# Patient Record
Sex: FEMALE | Race: WHITE | Employment: OTHER | URBAN - METROPOLITAN AREA
[De-identification: names, ages, dates, MRNs, and addresses within clinical notes are randomized per-mention and may not be internally consistent; named-entity substitution may affect disease eponyms.]

---

## 2017-01-17 DIAGNOSIS — E78.5 DYSLIPIDEMIA: ICD-10-CM

## 2017-01-17 RX ORDER — ATORVASTATIN CALCIUM 20 MG/1
20 TABLET, FILM COATED ORAL DAILY
Qty: 30 TABLET | Refills: 3
Start: 2017-01-17 | End: 2017-05-30 | Stop reason: SDUPTHER

## 2017-02-02 ENCOUNTER — OFFICE VISIT (OUTPATIENT)
Dept: INTERNAL MEDICINE | Age: 52
End: 2017-02-02
Attending: INTERNAL MEDICINE

## 2017-02-02 ENCOUNTER — HOSPITAL ENCOUNTER (OUTPATIENT)
Dept: GENERAL RADIOLOGY | Age: 52
Discharge: HOME | End: 2017-02-02

## 2017-02-02 VITALS
OXYGEN SATURATION: 94 % | RESPIRATION RATE: 20 BRPM | SYSTOLIC BLOOD PRESSURE: 151 MMHG | HEART RATE: 106 BPM | TEMPERATURE: 98.8 F | BODY MASS INDEX: 34.75 KG/M2 | DIASTOLIC BLOOD PRESSURE: 85 MMHG | WEIGHT: 190 LBS

## 2017-02-02 DIAGNOSIS — L98.9 SKIN LESION OF SCALP: ICD-10-CM

## 2017-02-02 DIAGNOSIS — R52 PAIN: ICD-10-CM

## 2017-02-02 DIAGNOSIS — E11.42 TYPE 2 DIABETES MELLITUS WITH DIABETIC POLYNEUROPATHY, WITH LONG-TERM CURRENT USE OF INSULIN (HCC): ICD-10-CM

## 2017-02-02 DIAGNOSIS — F43.29 STRESS AND ADJUSTMENT REACTION: ICD-10-CM

## 2017-02-02 DIAGNOSIS — Z79.4 TYPE 2 DIABETES MELLITUS WITH DIABETIC POLYNEUROPATHY, WITH LONG-TERM CURRENT USE OF INSULIN (HCC): ICD-10-CM

## 2017-02-02 DIAGNOSIS — L97.529 DIABETIC ULCER OF LEFT FOOT ASSOCIATED WITH TYPE 2 DIABETES MELLITUS (HCC): ICD-10-CM

## 2017-02-02 DIAGNOSIS — Z79.4 TYPE 2 DIABETES MELLITUS WITH DIABETIC POLYNEUROPATHY, WITH LONG-TERM CURRENT USE OF INSULIN (HCC): Primary | ICD-10-CM

## 2017-02-02 DIAGNOSIS — E11.42 TYPE 2 DIABETES MELLITUS WITH DIABETIC POLYNEUROPATHY, WITH LONG-TERM CURRENT USE OF INSULIN (HCC): Primary | ICD-10-CM

## 2017-02-02 DIAGNOSIS — E11.621 DIABETIC ULCER OF LEFT FOOT ASSOCIATED WITH TYPE 2 DIABETES MELLITUS (HCC): ICD-10-CM

## 2017-02-02 DIAGNOSIS — Z72.0 TOBACCO ABUSE: ICD-10-CM

## 2017-02-02 DIAGNOSIS — E11.42 DIABETIC POLYNEUROPATHY ASSOCIATED WITH TYPE 2 DIABETES MELLITUS (HCC): Chronic | ICD-10-CM

## 2017-02-02 DIAGNOSIS — M51.36 DEGENERATIVE DISC DISEASE, LUMBAR: ICD-10-CM

## 2017-02-02 LAB
ALBUMIN SERPL-MCNC: 3.9 G/DL (ref 3.4–5)
ANION GAP SERPL CALCULATED.3IONS-SCNC: 11 MMOL/L (ref 3–16)
BUN BLDV-MCNC: 15 MG/DL (ref 7–20)
CALCIUM SERPL-MCNC: 9.2 MG/DL (ref 8.3–10.6)
CHLORIDE BLD-SCNC: 97 MMOL/L (ref 99–110)
CO2: 26 MMOL/L (ref 21–32)
CREAT SERPL-MCNC: 1 MG/DL (ref 0.6–1.1)
GFR AFRICAN AMERICAN: >60
GFR NON-AFRICAN AMERICAN: 58
GLUCOSE BLD-MCNC: 354 MG/DL (ref 70–99)
PHOSPHORUS: 2.7 MG/DL (ref 2.5–4.9)
POTASSIUM SERPL-SCNC: 5 MMOL/L (ref 3.5–5.1)
SODIUM BLD-SCNC: 134 MMOL/L (ref 136–145)

## 2017-02-02 RX ORDER — OMEPRAZOLE 20 MG/1
20 CAPSULE, DELAYED RELEASE ORAL DAILY
Qty: 30 CAPSULE | Refills: 4 | Status: SHIPPED | OUTPATIENT
Start: 2017-02-02 | End: 2017-07-14 | Stop reason: SDUPTHER

## 2017-02-02 RX ORDER — DOXYCYCLINE 100 MG/1
100 TABLET ORAL 2 TIMES DAILY
Qty: 28 TABLET | Refills: 0 | Status: SHIPPED | OUTPATIENT
Start: 2017-02-02 | End: 2017-02-16

## 2017-02-02 RX ORDER — AMITRIPTYLINE HYDROCHLORIDE 100 MG/1
100 TABLET, FILM COATED ORAL NIGHTLY
Qty: 30 TABLET | Refills: 3 | Status: SHIPPED | OUTPATIENT
Start: 2017-02-02 | End: 2017-03-27 | Stop reason: SDUPTHER

## 2017-02-02 RX ORDER — CIPROFLOXACIN 500 MG/1
500 TABLET, FILM COATED ORAL 2 TIMES DAILY
Qty: 20 TABLET | Refills: 0 | Status: SHIPPED | OUTPATIENT
Start: 2017-02-02 | End: 2017-02-12

## 2017-02-03 ENCOUNTER — OFFICE VISIT (OUTPATIENT)
Dept: INTERNAL MEDICINE | Age: 52
End: 2017-02-03
Attending: PODIATRIST

## 2017-02-03 DIAGNOSIS — S91.302D OPEN WOUND OF FOOT, LEFT, SUBSEQUENT ENCOUNTER: Primary | ICD-10-CM

## 2017-02-03 LAB
ESTIMATED AVERAGE GLUCOSE: 277.6 MG/DL
HBA1C MFR BLD: 11.3 %

## 2017-02-03 RX ORDER — CEPHALEXIN 500 MG/1
500 CAPSULE ORAL 2 TIMES DAILY
Qty: 20 CAPSULE | Refills: 0 | Status: SHIPPED | OUTPATIENT
Start: 2017-02-03 | End: 2017-02-13

## 2017-02-07 ENCOUNTER — TELEPHONE (OUTPATIENT)
Dept: INTERNAL MEDICINE | Age: 52
End: 2017-02-07

## 2017-02-10 ENCOUNTER — HOSPITAL ENCOUNTER (OUTPATIENT)
Dept: MRI IMAGING | Age: 52
Discharge: HOME | End: 2017-02-10
Attending: PODIATRIST | Admitting: PODIATRIST

## 2017-02-10 ENCOUNTER — OFFICE VISIT (OUTPATIENT)
Dept: INTERNAL MEDICINE | Age: 52
End: 2017-02-10
Attending: PODIATRIST

## 2017-02-10 DIAGNOSIS — S91.302D OPEN WOUND OF FOOT, LEFT, SUBSEQUENT ENCOUNTER: ICD-10-CM

## 2017-02-10 DIAGNOSIS — S91.302D OPEN WOUND OF FOOT, LEFT, SUBSEQUENT ENCOUNTER: Primary | ICD-10-CM

## 2017-02-10 DIAGNOSIS — S91.302D UNSPECIFIED OPEN WOUND, LEFT FOOT, SUBSEQUENT ENCOUNTER: ICD-10-CM

## 2017-02-17 ENCOUNTER — OFFICE VISIT (OUTPATIENT)
Dept: INTERNAL MEDICINE | Age: 52
End: 2017-02-17
Attending: PODIATRIST

## 2017-02-17 DIAGNOSIS — S91.302D OPEN WOUND OF FOOT, LEFT, SUBSEQUENT ENCOUNTER: Primary | ICD-10-CM

## 2017-02-17 DIAGNOSIS — E11.42 DIABETIC POLYNEUROPATHY ASSOCIATED WITH TYPE 2 DIABETES MELLITUS (HCC): Chronic | ICD-10-CM

## 2017-02-17 DIAGNOSIS — L97.529 DIABETIC ULCER OF LEFT FOOT ASSOCIATED WITH TYPE 2 DIABETES MELLITUS (HCC): ICD-10-CM

## 2017-02-17 DIAGNOSIS — E11.621 DIABETIC ULCER OF LEFT FOOT ASSOCIATED WITH TYPE 2 DIABETES MELLITUS (HCC): ICD-10-CM

## 2017-02-17 RX ORDER — LISINOPRIL 10 MG/1
10 TABLET ORAL DAILY
Qty: 30 TABLET | Refills: 2
Start: 2017-02-17 | End: 2017-04-18 | Stop reason: SDUPTHER

## 2017-02-23 ENCOUNTER — OFFICE VISIT (OUTPATIENT)
Dept: INTERNAL MEDICINE | Age: 52
End: 2017-02-23
Attending: INTERNAL MEDICINE

## 2017-02-23 VITALS
HEART RATE: 92 BPM | OXYGEN SATURATION: 93 % | HEIGHT: 62 IN | TEMPERATURE: 98.4 F | SYSTOLIC BLOOD PRESSURE: 140 MMHG | DIASTOLIC BLOOD PRESSURE: 72 MMHG | WEIGHT: 188.8 LBS | BODY MASS INDEX: 34.74 KG/M2 | RESPIRATION RATE: 18 BRPM

## 2017-02-23 DIAGNOSIS — I10 ESSENTIAL HYPERTENSION: ICD-10-CM

## 2017-02-23 DIAGNOSIS — Z72.0 TOBACCO ABUSE: ICD-10-CM

## 2017-02-23 DIAGNOSIS — E11.42 TYPE 2 DIABETES MELLITUS WITH DIABETIC POLYNEUROPATHY, WITH LONG-TERM CURRENT USE OF INSULIN (HCC): Primary | ICD-10-CM

## 2017-02-23 DIAGNOSIS — Z01.818 PREOPERATIVE CLEARANCE: ICD-10-CM

## 2017-02-23 DIAGNOSIS — Z79.4 TYPE 2 DIABETES MELLITUS WITH DIABETIC POLYNEUROPATHY, WITH LONG-TERM CURRENT USE OF INSULIN (HCC): Primary | ICD-10-CM

## 2017-02-24 ENCOUNTER — HOSPITAL ENCOUNTER (OUTPATIENT)
Dept: SURGERY | Age: 52
Discharge: HOME | End: 2017-02-24
Attending: PODIATRIST | Admitting: PODIATRIST

## 2017-02-24 VITALS
DIASTOLIC BLOOD PRESSURE: 85 MMHG | SYSTOLIC BLOOD PRESSURE: 128 MMHG | OXYGEN SATURATION: 93 % | HEART RATE: 83 BPM | HEIGHT: 62 IN | WEIGHT: 188.8 LBS | RESPIRATION RATE: 16 BRPM | BODY MASS INDEX: 34.74 KG/M2 | TEMPERATURE: 96.6 F

## 2017-02-24 DIAGNOSIS — Z98.890 S/P FOOT SURGERY, LEFT: ICD-10-CM

## 2017-02-24 LAB
GLUCOSE BLD-MCNC: 108 MG/DL (ref 70–99)
GLUCOSE BLD-MCNC: 195 MG/DL (ref 70–99)
PERFORMED ON: ABNORMAL
PERFORMED ON: ABNORMAL

## 2017-02-24 RX ORDER — SODIUM CHLORIDE, SODIUM LACTATE, POTASSIUM CHLORIDE, CALCIUM CHLORIDE 600; 310; 30; 20 MG/100ML; MG/100ML; MG/100ML; MG/100ML
INJECTION, SOLUTION INTRAVENOUS CONTINUOUS
Status: DISCONTINUED | OUTPATIENT
Start: 2017-02-24 | End: 2017-02-25 | Stop reason: HOSPADM

## 2017-02-24 RX ORDER — HYDROCODONE BITARTRATE AND ACETAMINOPHEN 5; 325 MG/1; MG/1
1 TABLET ORAL EVERY 6 HOURS PRN
Status: DISCONTINUED | OUTPATIENT
Start: 2017-02-24 | End: 2017-02-25 | Stop reason: HOSPADM

## 2017-02-24 RX ORDER — SODIUM CHLORIDE 0.9 % (FLUSH) 0.9 %
10 SYRINGE (ML) INJECTION PRN
Status: DISCONTINUED | OUTPATIENT
Start: 2017-02-24 | End: 2017-02-25 | Stop reason: HOSPADM

## 2017-02-24 RX ORDER — CIPROFLOXACIN 500 MG/1
500 TABLET, FILM COATED ORAL 2 TIMES DAILY
Qty: 14 TABLET | Refills: 0 | Status: SHIPPED | OUTPATIENT
Start: 2017-02-24 | End: 2017-03-03

## 2017-02-24 RX ORDER — SODIUM CHLORIDE 0.9 % (FLUSH) 0.9 %
10 SYRINGE (ML) INJECTION EVERY 12 HOURS SCHEDULED
Status: DISCONTINUED | OUTPATIENT
Start: 2017-02-24 | End: 2017-02-25 | Stop reason: HOSPADM

## 2017-02-24 RX ORDER — HYDROCODONE BITARTRATE AND ACETAMINOPHEN 5; 325 MG/1; MG/1
1 TABLET ORAL EVERY 6 HOURS PRN
Qty: 30 TABLET | Refills: 0 | Status: SHIPPED | OUTPATIENT
Start: 2017-02-24 | End: 2017-03-03

## 2017-02-24 RX ORDER — ONDANSETRON 2 MG/ML
4 INJECTION INTRAMUSCULAR; INTRAVENOUS ONCE
Status: DISCONTINUED | OUTPATIENT
Start: 2017-02-24 | End: 2017-02-25 | Stop reason: HOSPADM

## 2017-02-24 RX ORDER — GLYCOPYRROLATE 0.2 MG/ML
0.1 INJECTION INTRAMUSCULAR; INTRAVENOUS ONCE
Status: DISCONTINUED | OUTPATIENT
Start: 2017-02-24 | End: 2017-02-25 | Stop reason: HOSPADM

## 2017-02-24 RX ADMIN — HYDROCODONE BITARTRATE AND ACETAMINOPHEN 1 TABLET: 5; 325 TABLET ORAL at 16:39

## 2017-02-24 ASSESSMENT — PAIN - FUNCTIONAL ASSESSMENT: PAIN_FUNCTIONAL_ASSESSMENT: 0-10

## 2017-02-24 ASSESSMENT — PAIN DESCRIPTION - DESCRIPTORS: DESCRIPTORS: ACHING

## 2017-02-24 ASSESSMENT — PAIN SCALES - GENERAL
PAINLEVEL_OUTOF10: 4
PAINLEVEL_OUTOF10: 0
PAINLEVEL_OUTOF10: 0

## 2017-03-01 LAB
ANAEROBIC CULTURE: NORMAL
GRAM STAIN RESULT: NORMAL
WOUND/ABSCESS: NORMAL

## 2017-03-03 ENCOUNTER — OFFICE VISIT (OUTPATIENT)
Dept: INTERNAL MEDICINE | Age: 52
End: 2017-03-03
Attending: PODIATRIST

## 2017-03-03 DIAGNOSIS — Z98.890 S/P FOOT SURGERY, LEFT: Primary | ICD-10-CM

## 2017-03-03 RX ORDER — HYDROCODONE BITARTRATE AND ACETAMINOPHEN 5; 325 MG/1; MG/1
1 TABLET ORAL EVERY 6 HOURS PRN
Qty: 20 TABLET | Refills: 0 | Status: SHIPPED | OUTPATIENT
Start: 2017-03-03 | End: 2017-03-10

## 2017-03-10 ENCOUNTER — OFFICE VISIT (OUTPATIENT)
Dept: INTERNAL MEDICINE | Age: 52
End: 2017-03-10
Attending: PODIATRIST

## 2017-03-10 ENCOUNTER — HOSPITAL ENCOUNTER (OUTPATIENT)
Dept: VASCULAR LAB | Age: 52
Discharge: HOME | End: 2017-03-10

## 2017-03-10 DIAGNOSIS — M79.662 PAIN OF LEFT LOWER LEG: ICD-10-CM

## 2017-03-10 DIAGNOSIS — L98.9 SKIN LESION OF SCALP: ICD-10-CM

## 2017-03-10 DIAGNOSIS — Z98.890 S/P FOOT SURGERY, LEFT: ICD-10-CM

## 2017-03-10 DIAGNOSIS — R60.0 LOCALIZED EDEMA: Primary | ICD-10-CM

## 2017-03-10 DIAGNOSIS — M79.662 PAIN OF LEFT CALF: Primary | ICD-10-CM

## 2017-03-10 RX ORDER — GABAPENTIN 600 MG/1
600 TABLET ORAL 3 TIMES DAILY
Qty: 90 TABLET | Refills: 2
Start: 2017-03-10 | End: 2017-08-04 | Stop reason: SDUPTHER

## 2017-03-17 ENCOUNTER — OFFICE VISIT (OUTPATIENT)
Dept: INTERNAL MEDICINE | Age: 52
End: 2017-03-17
Attending: PODIATRIST

## 2017-03-17 DIAGNOSIS — Z98.890 S/P FOOT SURGERY, LEFT: Primary | ICD-10-CM

## 2017-03-24 ENCOUNTER — OFFICE VISIT (OUTPATIENT)
Dept: INTERNAL MEDICINE | Age: 52
End: 2017-03-24
Attending: PODIATRIST

## 2017-03-24 DIAGNOSIS — Z98.890 S/P FOOT SURGERY, LEFT: Primary | ICD-10-CM

## 2017-03-27 DIAGNOSIS — Z79.4 TYPE 2 DIABETES MELLITUS WITH DIABETIC POLYNEUROPATHY, WITH LONG-TERM CURRENT USE OF INSULIN (HCC): ICD-10-CM

## 2017-03-27 DIAGNOSIS — E11.42 TYPE 2 DIABETES MELLITUS WITH DIABETIC POLYNEUROPATHY, WITH LONG-TERM CURRENT USE OF INSULIN (HCC): ICD-10-CM

## 2017-03-27 DIAGNOSIS — Z71.6 ENCOUNTER FOR SMOKING CESSATION COUNSELING: ICD-10-CM

## 2017-03-27 LAB
FUNGUS (MYCOLOGY) CULTURE: NORMAL
FUNGUS STAIN: NORMAL

## 2017-03-27 RX ORDER — AMITRIPTYLINE HYDROCHLORIDE 100 MG/1
100 TABLET, FILM COATED ORAL NIGHTLY
Qty: 90 TABLET | Refills: 0
Start: 2017-03-27 | End: 2017-05-16 | Stop reason: SDUPTHER

## 2017-03-27 RX ORDER — BUPROPION HYDROCHLORIDE 150 MG/1
150 TABLET, EXTENDED RELEASE ORAL 2 TIMES DAILY
Qty: 60 TABLET | Refills: 3
Start: 2017-03-27 | End: 2017-12-26 | Stop reason: SDUPTHER

## 2017-04-11 LAB
AFB CULTURE (MYCOBACTERIA): NORMAL
AFB SMEAR: NORMAL

## 2017-04-13 RX ORDER — PREGABALIN 75 MG/1
75 CAPSULE ORAL 2 TIMES DAILY
Qty: 60 CAPSULE | Refills: 1 | Status: SHIPPED | OUTPATIENT
Start: 2017-04-13 | End: 2017-05-25

## 2017-04-18 DIAGNOSIS — E11.42 DIABETIC POLYNEUROPATHY ASSOCIATED WITH TYPE 2 DIABETES MELLITUS (HCC): Chronic | ICD-10-CM

## 2017-04-18 RX ORDER — LISINOPRIL 10 MG/1
10 TABLET ORAL DAILY
Qty: 30 TABLET | Refills: 3
Start: 2017-04-18 | End: 2017-06-13 | Stop reason: SDUPTHER

## 2017-05-04 ENCOUNTER — OFFICE VISIT (OUTPATIENT)
Dept: INTERNAL MEDICINE | Age: 52
End: 2017-05-04
Attending: INTERNAL MEDICINE

## 2017-05-04 VITALS
OXYGEN SATURATION: 95 % | HEART RATE: 96 BPM | TEMPERATURE: 98.9 F | RESPIRATION RATE: 20 BRPM | WEIGHT: 195.2 LBS | SYSTOLIC BLOOD PRESSURE: 165 MMHG | BODY MASS INDEX: 35.92 KG/M2 | DIASTOLIC BLOOD PRESSURE: 90 MMHG | HEIGHT: 62 IN

## 2017-05-04 DIAGNOSIS — E11.42 TYPE 2 DIABETES MELLITUS WITH DIABETIC POLYNEUROPATHY, UNSPECIFIED LONG TERM INSULIN USE STATUS: Primary | ICD-10-CM

## 2017-05-04 DIAGNOSIS — M51.36 DEGENERATIVE DISC DISEASE, LUMBAR: ICD-10-CM

## 2017-05-04 DIAGNOSIS — Z71.6 ENCOUNTER FOR SMOKING CESSATION COUNSELING: ICD-10-CM

## 2017-05-04 DIAGNOSIS — Z98.890 S/P FOOT SURGERY, LEFT: ICD-10-CM

## 2017-05-04 DIAGNOSIS — I10 ESSENTIAL HYPERTENSION: ICD-10-CM

## 2017-05-04 NOTE — PROGRESS NOTES
Department of Internal Medicine  General Internal Medicine  Resident Progress Note    Date: 05/04/17                                               Subjective/Objective:     Patient presents to clinic today for pfollow up of her foot s/p left great toe amputation and for her chronic low back pain. She has some swelling and blistering of left foot. She thinks she is walking in a different fashion - outside to inside rather than heel-to-toe - since the surgery. She denies fevers, chills, drainage from her surgical site. She denies chest pain, shortness of breath, nausea, vomiting or diarrhea. She is still having low back pain. It is not getting better and not getting worse. She is still smoking and is planning to quit on 5/7/17. She is taking her diabetes medications as prescribed. Sometimes she forgets to eat and her blood sugars go low, in which case she eats some snack crackers. She has no other new complaints.          Patient Active Problem List    Diagnosis Date Noted    Open wound of foot 05/22/2014     Priority: High     Class: Chronic    S/P foot surgery, left 06/03/2016    Diabetic ulcer of left foot associated with type 2 diabetes mellitus ([facility] 02/22/2016    Obesity (BMI 30-39.9) 09/23/2015    Chronic low back pain 09/23/2015    Burn 01/05/2015    Tinea pedis 08/22/2013    Depressive disorder, not elsewhere classified 05/17/2013    Anxiety state 05/17/2013    Onychomycosis 11/15/2012    Hypoxia 07/21/2011    Acute pancreatitis 07/16/2011    Pneumonia 07/15/2011    Nicotine addiction 07/15/2011    Feet clawing     Diabetic neuropathy (HCC)     Tinea pedis     HTN (hypertension)     Amenorrhea     Anomalies of nails     Dyslipidemia 05/01/2009    Dyspareunia 05/01/2009    Neuropathy ([facility] 05/01/2009    Bacterial vaginosis 04/01/2008    Pain in back 04/01/2008    Tobacco abuse 03/01/2008    Scalp lesion 08/01/2007    Diabetes mellitus, type II ([address] 08/01/2007    Vaginal bleeding, abnormal 06/01/2007    Carpal tunnel syndrome 05/01/2007    ETOH abuse 03/04/2007    Pseudocyst, pancreas 05/14/2004    Pancreatitis 05/14/2004    Asthma 05/14/2004    Pain, eye, right 05/14/2004    DVT (deep venous thrombosis) ([facility]) 03/01/2004       Review of Systems  Pertinent information noted in HPI    Vitals:  BP (!) 165/90 (Site: Left Arm, Position: Sitting, Cuff Size: Large Adult)  Pulse 96  Temp 98.9 °F (37.2 °C) (Oral)   Resp 20  Ht 5' 2\" (1.575 m)  Wt 195 lb 3.2 oz (88.5 kg)  SpO2 95%  BMI 35.7 kg/m2      PHYSICAL EXAM:  GENERAL: awake, alert, cooperative, no apparent distress, and appears stated age  LUNGS: No increased work of breathing, good air exchange, clear to auscultation bilaterally  CARDIOVASCULAR: regular rate and rhythm   ABDOMEN: Soft, nontender, nondistended  MUSCULOSKELETAL: Full range of motion noted. Appropriate tone and bulk. Uses cane to walk  NEUROLOGIC: Alert and oriented x3 No focal deficits   SKIN: appropriate turgor and temperature  EXTREMITIES: mild edema of the lower extremities; left foot s/p great toe amputation; mild erythema/edema of distal foot; hyperkeratosis and blood blistering over 5th metatarsal head; palpable DP pulse; appropriate cft; no other gross lesions, no drainage or purulence; right foot with no gross lesions; palpable DP pulse, appropriate cft; dystrophic nails on all digits bilaterally; mild medial right heel hyperkeratosis    Assessment/Plan     The patient is a 46 y.o. female with h/o of diabetes mellitus type 2, left great toe infection s/p amputation, chronic degenerative lumbar disc disease and tobacco use who presents for follow up of her left foot wound.      Left foot ulcer s/p left great toe amputation - follow up with podiatry 5/5/2017    DM2 with insulin use and peripheral neuropathy - continue 50 units BID; continue metformin 1000mg BID; patient did not get good result from pregabalin, is back on neurontin 600mg TID; will recheck HbA1c prior to next visit. Patient advised on importance of continuing to eat regularly while on insulin and metformin and to limit refined sugar, excess carbohydrates and saturated fat in her diet    Lower back pain - history of lumbar DJD; persistent pain with walking; on methadone; MRI lumbar spine 9/2015 showed L4-L5/L5-S1 degenerative disc disease; advised to try 4% lidocaine patch OTC and thermacare patch OTC for conservative therapy    Smoking cessation - patient smoking less than 1 ppd; still smoking, although less with nicotine patches; still taking bupropion; plans for quite date of 5/7/17; patient advised of health benefits of quitting smoking; advised of measures to take to limit chance of relapse, including but not limited to removing all tobacco products from the house, not buying more cigarettes, mustering appropriate social support; informed of 1-800-QUIT-NOW for resources for smoking cessation    HTN - on lisinopril    Patient expressed agreement with and understanding of the plan. RTC 2 months      Discussed with Attending. Dr. Emmette Felty, MD     5/4/2017  3:33 PM  Pager #: [phone]    Addendum to Resident H& P/Progress note:  I have personally seen,examined and evaluated the patient.  I have reviewed the current history, physical findings, labs and assessment and plan and agree with note as documented by resident MD ( Newton Gomez)      Umu Marcelino MD, [address]

## 2017-05-04 NOTE — PROGRESS NOTES
[facility]       NURSING PROGRESS NOTE      May 4, 2017  Chito Roberts    Chief Complaint:   Chief Complaint   Patient presents with    Check-Up       Constitutional: negative  Eyes: negative  Ears, nose, mouth, throat, and face: negative  Respiratory: negative  Cardiovascular: negative  Gastrointestinal: negative  Genitourinary: negative  Integument/breast: negative  Hematologic/lymphatic: negative  Musculoskeletal: positive for back pain  Neurological: negative  Diabetes: Yes  Yes:  Medication compliance:  compliant all of the time  Diabetic diet compliance:  compliant most of the time  Home glucose monitoring:  is performed regularly  Last eye exam:  2 years  Acute symptoms hyperglycemia:  none  Acute symptoms hypoglycemia:  jitteriness and sweating  POC glucose today: 70 mg/dL    Pain Assessment:  Pain Present: yes  Pain Score: 3/10 left foot 8/10/back  Pain Quality/Description: Aching    Mobility/Safety/Self-Care:  Steady Gait: Yes  History of Falls: No   History of a Fall within the last 30 days No  Assistive Device: Straight Cane  Poor Hygiene: No    Psychosocial:   Depression: Yes  If YES,    Does Patient express current thoughts of harming self or others?: No  Anxious: Yes  Insomnia: Yes  Inappropriate Behavior: No  Alcohol Abuse: no  Substance Abuse: no  Signs of Physical Abuse: No  Signs of Emotional Abuse: No    Note: Pt c/o her left foot has a blister on the lateral side of her left foot. Pt also states she has back pain. Pt states she liked the gabapentin better than the Lyrica, so she went back to taking   The gabapentin.         2:36 PM 5/4/2017

## 2017-05-04 NOTE — MR AVS SNAPSHOT
After Visit Summary             Ariel Rick   2017 2:15 PM   Office Visit    Description:  Female : 1965   Provider:  Mally Feliz MD; [facility]   Department:  [address] [place] [address] and Future Appointments         Below is a list of your follow-up and future appointments. This may not be a complete list as you may have made appointments directly with providers that we are not aware of or your providers may have made some for you. Please call your providers to confirm appointments. It is important to keep your appointments. Please bring your current insurance card, photo ID, co-pay, and all medication bottles to your appointment. If self-pay, payment is expected at the time of service. Your To-Do List     Future Appointments Provider Department Dept Phone    2017 1:35  Adan Jones [phone]         Information from Your Visit        Department     Name Address Phone Fax    [id number] FELIPE Zamorano [facility] [address] [phone]      You Were Seen for:         Comments    Type 2 diabetes mellitus with diabetic polyneuropathy, unspecified long term insulin use status ([facility]   [[id number]]         Vital Signs     Blood Pressure Pulse Temperature Respirations Height Weight    165/90 (Site: Left Arm, Position: Sitting, Cuff Size: Large Adult) 96 98.9 °F (37.2 °C) (Oral) 20 5' 2\" (1.575 m) 195 lb 3.2 oz (88.5 kg)    Oxygen Saturation Body Mass Index Smoking Status             95% 35.7 kg/m2 Current Every Day Smoker         Additional Information about your Body Mass Index (BMI)           Your BMI as listed above is considered obese (30 or more). BMI is an estimate of body fat, calculated from your height and weight.   The higher your BMI, the greater your risk of heart disease, high blood pressure, type 2 diabetes, stroke, gallstones, arthritis, sleep apnea, and certain by Does not apply route 2 times daily    lisinopril (PRINIVIL) 10 MG tablet Take 1 tablet by mouth daily    amitriptyline (ELAVIL) 100 MG tablet Take 1 tablet by mouth nightly    buPROPion (WELLBUTRIN SR) 150 MG extended release tablet Take 1 tablet by mouth 2 times daily    gabapentin (NEURONTIN) 600 MG tablet Take 1 tablet by mouth 3 times daily    omeprazole (PRILOSEC) 20 MG delayed release capsule Take 1 capsule by mouth Daily    glucose blood VI test strips (FREESTYLE TEST STRIPS) strip 1 each by In Vitro route daily As needed. Insulin Pen Needle 31G X 5 MM MISC 1 each by Does not apply route daily    conjugated estrogens (PREMARIN) 0.625 MG/GM vaginal cream Place vaginally 3-4 times a week. nicotine polacrilex (NICORETTE) 2 MG gum Take 1 each by mouth as needed for Smoking cessation    atorvastatin (LIPITOR) 20 MG tablet Take 1 tablet by mouth daily    metFORMIN (GLUCOPHAGE) 500 MG tablet Take 2 tablets by mouth 2 times daily (with meals)    nicotine (NICODERM CQ) 14 MG/24HR Place 1 patch onto the skin every 24 hours    Insulin Syringe-Needle U-100 30G X 5/16\" 0.5 ML MISC 1 each by Does not apply route daily    METHADONE HCL PO Take 146 mg by mouth daily    pregabalin (LYRICA) 75 MG capsule Take 1 capsule by mouth 2 times daily    Heat Wraps (THERMACARE BACK/HIP) MISC 1 patch by Does not apply route as needed (back pain)    bisacodyl (BISAC-EVAC) 5 MG EC tablet Take 1 tablet by mouth daily as needed for Constipation      Allergies              Sulfa Antibiotics          Additional Information        Basic Information     Date Of Birth Sex Race Ethnicity Preferred Language    1965 Female White Non-/Non  English      Problem List as of 5/4/2017  Date Reviewed: 9/23/2015                Open wound of foot    S/P foot surgery, left    Diabetic ulcer of left foot associated with type 2 diabetes mellitus (HCC)    Obesity (BMI 30-39. 9)    Chronic low back pain    Burn    Tinea pedis [employer] Access Code: [email or web address]  Expires: 7/3/2017  3:36 PM    4. Enter your Social Security Number ([id number]) and Date of Birth (mm/dd/yyyy) as indicated and click Submit. You will be taken to the next sign-up page. 5. Create a Mosaic ID. This will be your M[employer] login ID and cannot be changed, so think of one that is secure and easy to remember. 6. Create a [employer]osaic password. You can change your password at any time. 7. Enter your Password Reset Question and Answer. This can be used at a later time if you forget your password. 8. Enter your e-mail address. You will receive e-mail notification when new information is available in [address] 9. Click Sign Up. You can now view your medical record. Additional Information  If you have questions, please contact the physician practice where you receive care. Remember, [employer] is NOT to be used for urgent needs. For medical emergencies, dial 911. For questions regarding your [employer] account call [phone]. If you have a clinical question, please call your doctor's office.

## 2017-05-05 ENCOUNTER — OFFICE VISIT (OUTPATIENT)
Dept: INTERNAL MEDICINE | Age: 52
End: 2017-05-05
Attending: PODIATRIST

## 2017-05-05 DIAGNOSIS — E11.42 TYPE 2 DIABETES MELLITUS WITH DIABETIC POLYNEUROPATHY, UNSPECIFIED LONG TERM INSULIN USE STATUS: Primary | ICD-10-CM

## 2017-05-16 DIAGNOSIS — E11.42 TYPE 2 DIABETES MELLITUS WITH DIABETIC POLYNEUROPATHY, WITH LONG-TERM CURRENT USE OF INSULIN (HCC): ICD-10-CM

## 2017-05-16 DIAGNOSIS — E11.42 DIABETIC POLYNEUROPATHY ASSOCIATED WITH TYPE 2 DIABETES MELLITUS (HCC): Chronic | ICD-10-CM

## 2017-05-16 DIAGNOSIS — Z79.4 TYPE 2 DIABETES MELLITUS WITH DIABETIC POLYNEUROPATHY, WITH LONG-TERM CURRENT USE OF INSULIN (HCC): ICD-10-CM

## 2017-05-16 RX ORDER — AMITRIPTYLINE HYDROCHLORIDE 100 MG/1
100 TABLET, FILM COATED ORAL NIGHTLY
Qty: 30 TABLET | Refills: 1
Start: 2017-05-16 | End: 2017-09-25 | Stop reason: SDUPTHER

## 2017-05-30 DIAGNOSIS — E78.5 DYSLIPIDEMIA: ICD-10-CM

## 2017-05-30 RX ORDER — ATORVASTATIN CALCIUM 20 MG/1
20 TABLET, FILM COATED ORAL DAILY
Qty: 30 TABLET | Refills: 2
Start: 2017-05-30 | End: 2017-11-20 | Stop reason: SDUPTHER

## 2017-06-13 DIAGNOSIS — E11.42 DIABETIC POLYNEUROPATHY ASSOCIATED WITH TYPE 2 DIABETES MELLITUS (HCC): Chronic | ICD-10-CM

## 2017-06-13 RX ORDER — LISINOPRIL 10 MG/1
10 TABLET ORAL DAILY
Qty: 30 TABLET | Refills: 3
Start: 2017-06-13 | End: 2017-09-19 | Stop reason: SDUPTHER

## 2017-06-21 ENCOUNTER — TELEPHONE (OUTPATIENT)
Dept: INTERNAL MEDICINE | Age: 52
End: 2017-06-21

## 2017-06-22 ENCOUNTER — OFFICE VISIT (OUTPATIENT)
Dept: INTERNAL MEDICINE | Age: 52
End: 2017-06-22
Attending: STUDENT IN AN ORGANIZED HEALTH CARE EDUCATION/TRAINING PROGRAM

## 2017-06-22 VITALS
TEMPERATURE: 99.2 F | SYSTOLIC BLOOD PRESSURE: 187 MMHG | DIASTOLIC BLOOD PRESSURE: 100 MMHG | HEART RATE: 109 BPM | RESPIRATION RATE: 18 BRPM | OXYGEN SATURATION: 94 %

## 2017-06-22 DIAGNOSIS — R60.0 BILATERAL LEG EDEMA: ICD-10-CM

## 2017-06-22 DIAGNOSIS — Z02.89 PAIN MEDICATION AGREEMENT SIGNED: ICD-10-CM

## 2017-06-22 DIAGNOSIS — L97.529 DIABETIC ULCER OF LEFT FOOT ASSOCIATED WITH TYPE 2 DIABETES MELLITUS (HCC): ICD-10-CM

## 2017-06-22 DIAGNOSIS — E11.42 TYPE 2 DIABETES MELLITUS WITH DIABETIC POLYNEUROPATHY, UNSPECIFIED LONG TERM INSULIN USE STATUS: ICD-10-CM

## 2017-06-22 DIAGNOSIS — L03.116 CELLULITIS OF LEFT LOWER EXTREMITY: Primary | ICD-10-CM

## 2017-06-22 DIAGNOSIS — I50.9 CONGESTIVE HEART FAILURE, UNSPECIFIED CONGESTIVE HEART FAILURE CHRONICITY, UNSPECIFIED CONGESTIVE HEART FAILURE TYPE: ICD-10-CM

## 2017-06-22 DIAGNOSIS — F17.219 CIGARETTE NICOTINE DEPENDENCE WITH NICOTINE-INDUCED DISORDER: ICD-10-CM

## 2017-06-22 DIAGNOSIS — E11.621 DIABETIC ULCER OF LEFT FOOT ASSOCIATED WITH TYPE 2 DIABETES MELLITUS (HCC): ICD-10-CM

## 2017-06-22 LAB
A/G RATIO: 1.1 (ref 1.1–2.2)
ALBUMIN SERPL-MCNC: 3.8 G/DL (ref 3.4–5)
ALP BLD-CCNC: 186 U/L (ref 40–129)
ALT SERPL-CCNC: 27 U/L (ref 10–40)
ANION GAP SERPL CALCULATED.3IONS-SCNC: 14 MMOL/L (ref 3–16)
AST SERPL-CCNC: 19 U/L (ref 15–37)
BASOPHILS ABSOLUTE: 0.1 K/UL (ref 0–0.2)
BASOPHILS RELATIVE PERCENT: 0.7 %
BILIRUB SERPL-MCNC: <0.2 MG/DL (ref 0–1)
BUN BLDV-MCNC: 11 MG/DL (ref 7–20)
CALCIUM SERPL-MCNC: 9.6 MG/DL (ref 8.3–10.6)
CHLORIDE BLD-SCNC: 94 MMOL/L (ref 99–110)
CO2: 27 MMOL/L (ref 21–32)
CREAT SERPL-MCNC: 0.9 MG/DL (ref 0.6–1.1)
EOSINOPHILS ABSOLUTE: 0.2 K/UL (ref 0–0.6)
EOSINOPHILS RELATIVE PERCENT: 1.8 %
GFR AFRICAN AMERICAN: >60
GFR NON-AFRICAN AMERICAN: >60
GLOBULIN: 3.6 G/DL
GLUCOSE BLD-MCNC: 414 MG/DL (ref 70–99)
HCT VFR BLD CALC: 42.5 % (ref 36–48)
HEMOGLOBIN: 14.1 G/DL (ref 12–16)
LYMPHOCYTES ABSOLUTE: 2 K/UL (ref 1–5.1)
LYMPHOCYTES RELATIVE PERCENT: 23.6 %
MCH RBC QN AUTO: 30.7 PG (ref 26–34)
MCHC RBC AUTO-ENTMCNC: 33.3 G/DL (ref 31–36)
MCV RBC AUTO: 92.1 FL (ref 80–100)
MONOCYTES ABSOLUTE: 0.5 K/UL (ref 0–1.3)
MONOCYTES RELATIVE PERCENT: 6.3 %
NEUTROPHILS ABSOLUTE: 5.9 K/UL (ref 1.7–7.7)
NEUTROPHILS RELATIVE PERCENT: 67.6 %
PDW BLD-RTO: 14.3 % (ref 12.4–15.4)
PLATELET # BLD: 288 K/UL (ref 135–450)
PMV BLD AUTO: 9 FL (ref 5–10.5)
POTASSIUM SERPL-SCNC: 5.6 MMOL/L (ref 3.5–5.1)
PRO-BNP: 88 PG/ML (ref 0–124)
RBC # BLD: 4.61 M/UL (ref 4–5.2)
SODIUM BLD-SCNC: 135 MMOL/L (ref 136–145)
TOTAL PROTEIN: 7.4 G/DL (ref 6.4–8.2)
WBC # BLD: 8.7 K/UL (ref 4–11)

## 2017-06-22 RX ORDER — TRAMADOL HYDROCHLORIDE 50 MG/1
50 TABLET ORAL EVERY 6 HOURS PRN
Qty: 10 TABLET | Refills: 0 | Status: SHIPPED | OUTPATIENT
Start: 2017-06-22 | End: 2017-08-28 | Stop reason: ALTCHOICE

## 2017-06-22 RX ORDER — DOXYCYCLINE 100 MG/1
100 TABLET ORAL 2 TIMES DAILY
Qty: 20 TABLET | Refills: 0 | Status: SHIPPED | OUTPATIENT
Start: 2017-06-22 | End: 2017-06-29

## 2017-06-22 RX ORDER — CEPHALEXIN 500 MG/1
500 CAPSULE ORAL 2 TIMES DAILY
Qty: 14 CAPSULE | Refills: 0 | Status: SHIPPED | OUTPATIENT
Start: 2017-06-22 | End: 2017-06-29

## 2017-06-23 LAB
ESTIMATED AVERAGE GLUCOSE: 240.3 MG/DL
HBA1C MFR BLD: 10 %

## 2017-06-26 ENCOUNTER — OFFICE VISIT (OUTPATIENT)
Dept: INTERNAL MEDICINE | Age: 52
End: 2017-06-26
Attending: PODIATRIST

## 2017-06-26 DIAGNOSIS — S91.302A OPEN WOUND OF FOOT, LEFT, INITIAL ENCOUNTER: Primary | ICD-10-CM

## 2017-07-14 ENCOUNTER — OFFICE VISIT (OUTPATIENT)
Dept: INTERNAL MEDICINE | Age: 52
End: 2017-07-14
Attending: PODIATRIST

## 2017-07-14 DIAGNOSIS — L97.529 DIABETIC ULCER OF LEFT FOOT ASSOCIATED WITH TYPE 2 DIABETES MELLITUS (HCC): Primary | ICD-10-CM

## 2017-07-14 DIAGNOSIS — E11.621 DIABETIC ULCER OF LEFT FOOT ASSOCIATED WITH TYPE 2 DIABETES MELLITUS (HCC): Primary | ICD-10-CM

## 2017-07-14 RX ORDER — OMEPRAZOLE 20 MG/1
20 CAPSULE, DELAYED RELEASE ORAL DAILY
Qty: 30 CAPSULE | Refills: 1 | Status: SHIPPED | OUTPATIENT
Start: 2017-07-14 | End: 2017-10-03 | Stop reason: SDUPTHER

## 2017-07-21 ENCOUNTER — OFFICE VISIT (OUTPATIENT)
Dept: INTERNAL MEDICINE | Age: 52
End: 2017-07-21
Attending: PODIATRIST

## 2017-07-21 DIAGNOSIS — Z98.890 S/P FOOT SURGERY, LEFT: ICD-10-CM

## 2017-07-21 DIAGNOSIS — G89.29 CHRONIC LOW BACK PAIN, UNSPECIFIED BACK PAIN LATERALITY, WITH SCIATICA PRESENCE UNSPECIFIED: ICD-10-CM

## 2017-07-21 DIAGNOSIS — E11.621 DIABETIC ULCER OF LEFT FOOT ASSOCIATED WITH TYPE 2 DIABETES MELLITUS (HCC): ICD-10-CM

## 2017-07-21 DIAGNOSIS — M54.5 CHRONIC LOW BACK PAIN, UNSPECIFIED BACK PAIN LATERALITY, WITH SCIATICA PRESENCE UNSPECIFIED: ICD-10-CM

## 2017-07-21 DIAGNOSIS — L97.529 DIABETIC ULCER OF LEFT FOOT ASSOCIATED WITH TYPE 2 DIABETES MELLITUS (HCC): ICD-10-CM

## 2017-07-21 DIAGNOSIS — S91.302S: Primary | ICD-10-CM

## 2017-07-28 ENCOUNTER — OFFICE VISIT (OUTPATIENT)
Dept: INTERNAL MEDICINE | Age: 52
End: 2017-07-28
Attending: PODIATRIST

## 2017-07-28 DIAGNOSIS — S91.302S: Primary | ICD-10-CM

## 2017-07-28 DIAGNOSIS — S91.104S: ICD-10-CM

## 2017-07-28 NOTE — MR AVS SNAPSHOT
bisacodyl (BISAC-EVAC) 5 MG EC tablet Take 1 tablet by mouth daily as needed for Constipation    METHADONE HCL PO Take 146 mg by mouth daily      Allergies              Sulfa Antibiotics          Additional Information        Basic Information     Date Of Birth Sex Race Ethnicity Preferred Language    1965 Female White Non-/Non  English      Problem List as of 7/28/2017  Date Reviewed: 7/14/2017                Open wound of foot    Pain medication agreement signed    S/P foot surgery, left    Diabetic ulcer of left foot associated with type 2 diabetes mellitus ([facility]    Obesity (BMI 30-39. 9)    Burn    Tinea pedis    Depressive disorder, not elsewhere classified    Anxiety state    Onychomycosis    Hypoxia    Acute pancreatitis    Pneumonia    Nicotine addiction    Feet clawing    Diabetic neuropathy (HCC)    Tinea pedis    HTN (hypertension)    Amenorrhea    Anomalies of nails    Dyslipidemia    Dyspareunia    Neuropathy (HCC)    Bacterial vaginosis    Pain in back    Tobacco abuse    Scalp lesion    Diabetes mellitus, type II (HCC)    Vaginal bleeding, abnormal    Carpal tunnel syndrome    ETOH abuse    Pseudocyst, pancreas    Pancreatitis    Asthma    Pain, eye, right    DVT (deep venous thrombosis) ([facility]      Immunizations as of 7/28/2017     Name Date    Influenza Virus Vaccine 10/16/2015, 9/12/2014, 11/8/2011    Influenza, Quadrivalent, IM, Preservative Free 11/10/2016    Pneumococcal Polysaccharide ([id number]) 11/13/2015    Tdap (Boostrix, Adacel) 7/22/2014      Preventive Care        Date Due    Colonoscopy 7/22/2013    Eye Exam By An Eye Doctor 8/28/2016    Diabetic Foot Exam 2/1/2017    Urine Check For Kidney Problems 4/15/2017    Yearly Flu Vaccine (1) 8/1/2017    Mammograms are recommended every 2 years for low/average risk patients aged 48 - 69, and every year for high risk patients per updated national guidelines.  However these guidelines can be individualized by your provider. 9/10/2017    Hemoglobin A1C (Test For Long-Term Glucose Control) 9/22/2017    Cholesterol Screening 11/10/2017    Pap Smear 4/15/2019    Tetanus Combination Vaccine (2 - Td) 7/22/2024            MyChart Signup           [facility] allows you to send messages to your doctor, view your test results, renew your prescriptions, schedule appointments, view visit notes, and more. How Do I Sign Up? 1. In your Internet browser, go to [email or web address]  2. Click on the Sign Up Now link in the Sign In box. You will see the New Member Sign Up page. 3. Enter your [facility] Access Code exactly as it appears below. You will not need to use this code after youve completed the sign-up process. If you do not sign up before the expiration date, you must request a new code. [facility] Access Code: [email or web address]  Expires: 9/12/2017  2:34 PM    4. Enter your Social Security Number (xxx-xx-xxxx) and Date of Birth (mm/dd/yyyy) as indicated and click Submit. You will be taken to the next sign-up page. 5. Create a [facility] ID. This will be your [facility] [employer] [facility] login ID and cannot be changed, so think of one that is secure and easy to remember. 6. Create a [facility] password. You can change your password at any time. 7. Enter your Password Reset Question and Answer. This can be used at a later time if you forget your password. 8. Enter your e-mail address. You will receive e-mail notification when new information is available in [address] 9. Click Sign Up. You can now view your medical record. Additional Information  If you have questions, please contact the physician practice where you receive care. Remember, [facility] is NOT to be used for urgent needs. For medical emergencies, dial 911. For questions regarding your [facility] account call [phone]. If you have a clinical question, please call your doctor's office.

## 2017-07-28 NOTE — PROGRESS NOTES
OUTPATIENT SPECIALTY PODIATRY VISIT      Nursing Progress Note      July 28, 2017  Chapo Gonzales    Patient description of the problem: bilateral foot ulcers    Observations: right foot small ulcer on ball of foot. Small mount tan drainage. . Left foot 2 nd toe with blister noted. Great toe with silver dollar size ulcer . Smell strong. Large amount tan /brown drainage. Ball of foot with small ulcer . Same type drainage as great toe.        Ambulates: without assistance    Gait: Normal     Assistive Devices: straight cane    Fall History: No    Foot Hygiene: good    Foot Wear Proper: Yes    Impaired Skin Integrity: Yes as above     Pain Assessment:  Pain Present: yes  Pain Score: 5/10  Pain Quality/Description: Aching  Pain Onset: long time  ago  Pain Goal of patient: 0/10    Education Assessment:    Identify the learner who is being assessed for education:  patient                    Ability to Learn:  Exhibits ability to grasp concepts and respond to questions: Medium  Ready to Learn: Yes  calm   Preferred Method of Learning:  written  Barriers to Learning: Verbalizes interest  Special Considerations due to cultural, Tenriism, spiritual beliefs:  No  Language:  English  :  No    Roman Vasquez Page  2:39 PM 7/28/2017

## 2017-08-04 ENCOUNTER — OFFICE VISIT (OUTPATIENT)
Dept: INTERNAL MEDICINE | Age: 52
End: 2017-08-04
Attending: PODIATRIST

## 2017-08-04 DIAGNOSIS — E11.621 DIABETIC ULCER OF LEFT MIDFOOT ASSOCIATED WITH TYPE 2 DIABETES MELLITUS, WITH FAT LAYER EXPOSED (HCC): ICD-10-CM

## 2017-08-04 DIAGNOSIS — L97.422 DIABETIC ULCER OF LEFT MIDFOOT ASSOCIATED WITH TYPE 2 DIABETES MELLITUS, WITH FAT LAYER EXPOSED (HCC): ICD-10-CM

## 2017-08-04 DIAGNOSIS — L98.9 SKIN LESION OF SCALP: ICD-10-CM

## 2017-08-04 DIAGNOSIS — I73.9 PAD (PERIPHERAL ARTERY DISEASE) (HCC): Primary | ICD-10-CM

## 2017-08-04 RX ORDER — GABAPENTIN 600 MG/1
600 TABLET ORAL 3 TIMES DAILY
Qty: 90 TABLET | Refills: 2
Start: 2017-08-04 | End: 2017-11-09 | Stop reason: SDUPTHER

## 2017-08-10 ENCOUNTER — HOSPITAL ENCOUNTER (OUTPATIENT)
Dept: VASCULAR LAB | Age: 52
Discharge: HOME | End: 2017-08-10
Attending: PODIATRIST | Admitting: PODIATRIST

## 2017-08-10 DIAGNOSIS — I73.9 PAD (PERIPHERAL ARTERY DISEASE) (HCC): Primary | ICD-10-CM

## 2017-08-10 DIAGNOSIS — I73.9 PERIPHERAL VASCULAR DISEASE (HCC): ICD-10-CM

## 2017-08-10 DIAGNOSIS — L97.509 ULCER OF FOOT, UNSPECIFIED LATERALITY, WITH UNSPECIFIED SEVERITY (HCC): ICD-10-CM

## 2017-08-11 ENCOUNTER — OFFICE VISIT (OUTPATIENT)
Dept: INTERNAL MEDICINE | Age: 52
End: 2017-08-11
Attending: PODIATRIST

## 2017-08-11 DIAGNOSIS — E11.42 DIABETIC POLYNEUROPATHY ASSOCIATED WITH TYPE 2 DIABETES MELLITUS (HCC): Chronic | ICD-10-CM

## 2017-08-11 DIAGNOSIS — E11.621 DIABETIC ULCER OF LEFT MIDFOOT ASSOCIATED WITH TYPE 2 DIABETES MELLITUS, WITH FAT LAYER EXPOSED (HCC): Primary | ICD-10-CM

## 2017-08-11 DIAGNOSIS — L97.422 DIABETIC ULCER OF LEFT MIDFOOT ASSOCIATED WITH TYPE 2 DIABETES MELLITUS, WITH FAT LAYER EXPOSED (HCC): Primary | ICD-10-CM

## 2017-08-16 RX ORDER — LANCETS 30 GAUGE
1 EACH MISCELLANEOUS 2 TIMES DAILY
Qty: 100 EACH | Refills: 3 | Status: SHIPPED | OUTPATIENT
Start: 2017-08-16 | End: 2018-07-23 | Stop reason: SDUPTHER

## 2017-08-16 RX ORDER — BLOOD-GLUCOSE METER
1 EACH MISCELLANEOUS 2 TIMES DAILY
Qty: 1 DEVICE | Refills: 0 | Status: ON HOLD | OUTPATIENT
Start: 2017-08-16 | End: 2018-08-15

## 2017-08-18 ENCOUNTER — OFFICE VISIT (OUTPATIENT)
Dept: INTERNAL MEDICINE | Age: 52
End: 2017-08-18
Attending: PODIATRIST

## 2017-08-18 DIAGNOSIS — L89.899 DECUBITUS ULCER OF BOTH FEET: Primary | ICD-10-CM

## 2017-08-21 DIAGNOSIS — Z71.6 ENCOUNTER FOR SMOKING CESSATION COUNSELING: ICD-10-CM

## 2017-08-21 RX ORDER — NICOTINE 21 MG/24HR
1 PATCH, TRANSDERMAL 24 HOURS TRANSDERMAL EVERY 24 HOURS
Qty: 30 PATCH | Refills: 2 | OUTPATIENT
Start: 2017-08-21 | End: 2018-04-25 | Stop reason: ALTCHOICE

## 2017-08-25 ENCOUNTER — OFFICE VISIT (OUTPATIENT)
Dept: INTERNAL MEDICINE | Age: 52
End: 2017-08-25
Attending: PODIATRIST

## 2017-08-25 DIAGNOSIS — L89.892 DECUBITUS ULCER OF LEFT FOOT, STAGE 2 (HCC): Primary | ICD-10-CM

## 2017-08-25 DIAGNOSIS — L89.892 DECUBITUS ULCER OF RIGHT FOOT, STAGE 2 (HCC): ICD-10-CM

## 2017-08-28 ENCOUNTER — OFFICE VISIT (OUTPATIENT)
Dept: INTERNAL MEDICINE | Age: 52
End: 2017-08-28
Attending: INTERNAL MEDICINE

## 2017-08-28 VITALS
SYSTOLIC BLOOD PRESSURE: 157 MMHG | WEIGHT: 199 LBS | DIASTOLIC BLOOD PRESSURE: 76 MMHG | BODY MASS INDEX: 36.62 KG/M2 | HEIGHT: 62 IN | TEMPERATURE: 98.3 F | RESPIRATION RATE: 20 BRPM | OXYGEN SATURATION: 95 % | HEART RATE: 98 BPM

## 2017-08-28 DIAGNOSIS — Z01.818 PREOPERATIVE EXAMINATION: Primary | ICD-10-CM

## 2017-08-28 DIAGNOSIS — L89.899 DECUBITUS ULCER OF BOTH FEET: ICD-10-CM

## 2017-08-28 DIAGNOSIS — M51.36 DEGENERATIVE DISC DISEASE, LUMBAR: ICD-10-CM

## 2017-08-28 DIAGNOSIS — E78.5 DYSLIPIDEMIA: ICD-10-CM

## 2017-08-28 DIAGNOSIS — E11.42 TYPE 2 DIABETES MELLITUS WITH DIABETIC POLYNEUROPATHY, WITH LONG-TERM CURRENT USE OF INSULIN (HCC): ICD-10-CM

## 2017-08-28 DIAGNOSIS — Z79.4 TYPE 2 DIABETES MELLITUS WITH DIABETIC POLYNEUROPATHY, WITH LONG-TERM CURRENT USE OF INSULIN (HCC): ICD-10-CM

## 2017-08-28 DIAGNOSIS — I10 ESSENTIAL HYPERTENSION: ICD-10-CM

## 2017-08-28 DIAGNOSIS — Z72.0 TOBACCO ABUSE: ICD-10-CM

## 2017-08-28 DIAGNOSIS — E11.42 TYPE 2 DIABETES MELLITUS WITH DIABETIC POLYNEUROPATHY, UNSPECIFIED LONG TERM INSULIN USE STATUS: ICD-10-CM

## 2017-08-28 LAB
ALBUMIN SERPL-MCNC: 4.1 G/DL (ref 3.4–5)
ANION GAP SERPL CALCULATED.3IONS-SCNC: 14 MMOL/L (ref 3–16)
BASOPHILS ABSOLUTE: 0.1 K/UL (ref 0–0.2)
BASOPHILS RELATIVE PERCENT: 0.7 %
BUN BLDV-MCNC: 25 MG/DL (ref 7–20)
CALCIUM SERPL-MCNC: 9.8 MG/DL (ref 8.3–10.6)
CHLORIDE BLD-SCNC: 97 MMOL/L (ref 99–110)
CO2: 25 MMOL/L (ref 21–32)
CREAT SERPL-MCNC: 0.9 MG/DL (ref 0.6–1.1)
EOSINOPHILS ABSOLUTE: 0.2 K/UL (ref 0–0.6)
EOSINOPHILS RELATIVE PERCENT: 2.3 %
GFR AFRICAN AMERICAN: >60
GFR NON-AFRICAN AMERICAN: >60
GLUCOSE BLD-MCNC: 376 MG/DL (ref 70–99)
HCT VFR BLD CALC: 41.7 % (ref 36–48)
HEMOGLOBIN: 14.1 G/DL (ref 12–16)
LYMPHOCYTES ABSOLUTE: 1.9 K/UL (ref 1–5.1)
LYMPHOCYTES RELATIVE PERCENT: 18.7 %
MCH RBC QN AUTO: 31.1 PG (ref 26–34)
MCHC RBC AUTO-ENTMCNC: 33.9 G/DL (ref 31–36)
MCV RBC AUTO: 91.8 FL (ref 80–100)
MONOCYTES ABSOLUTE: 0.4 K/UL (ref 0–1.3)
MONOCYTES RELATIVE PERCENT: 3.9 %
NEUTROPHILS ABSOLUTE: 7.6 K/UL (ref 1.7–7.7)
NEUTROPHILS RELATIVE PERCENT: 74.4 %
PDW BLD-RTO: 15.1 % (ref 12.4–15.4)
PHOSPHORUS: 2 MG/DL (ref 2.5–4.9)
PLATELET # BLD: 293 K/UL (ref 135–450)
PMV BLD AUTO: 9.4 FL (ref 5–10.5)
POTASSIUM SERPL-SCNC: 5.8 MMOL/L (ref 3.5–5.1)
RBC # BLD: 4.54 M/UL (ref 4–5.2)
SODIUM BLD-SCNC: 136 MMOL/L (ref 136–145)
WBC # BLD: 10.2 K/UL (ref 4–11)

## 2017-08-28 RX ORDER — AMLODIPINE BESYLATE 10 MG/1
5 TABLET ORAL DAILY
Qty: 30 TABLET | Refills: 3 | Status: SHIPPED | OUTPATIENT
Start: 2017-08-28 | End: 2017-10-09 | Stop reason: SDUPTHER

## 2017-08-29 ENCOUNTER — TELEPHONE (OUTPATIENT)
Dept: INTERNAL MEDICINE | Age: 52
End: 2017-08-29

## 2017-08-29 DIAGNOSIS — Z00.00 ENCOUNTER FOR PREVENTIVE HEALTH EXAMINATION: ICD-10-CM

## 2017-08-29 DIAGNOSIS — E11.621 DIABETIC ULCER OF LEFT FOOT ASSOCIATED WITH TYPE 2 DIABETES MELLITUS (HCC): ICD-10-CM

## 2017-08-29 DIAGNOSIS — E87.5 HYPERKALEMIA: Primary | ICD-10-CM

## 2017-08-29 DIAGNOSIS — Z98.890 S/P FOOT SURGERY, LEFT: ICD-10-CM

## 2017-08-29 DIAGNOSIS — E78.5 DYSLIPIDEMIA: ICD-10-CM

## 2017-08-29 DIAGNOSIS — Z79.4 TYPE 2 DIABETES MELLITUS WITH DIABETIC POLYNEUROPATHY, WITH LONG-TERM CURRENT USE OF INSULIN (HCC): ICD-10-CM

## 2017-08-29 DIAGNOSIS — L97.529 DIABETIC ULCER OF LEFT FOOT ASSOCIATED WITH TYPE 2 DIABETES MELLITUS (HCC): ICD-10-CM

## 2017-08-29 DIAGNOSIS — E11.42 TYPE 2 DIABETES MELLITUS WITH DIABETIC POLYNEUROPATHY, WITH LONG-TERM CURRENT USE OF INSULIN (HCC): ICD-10-CM

## 2017-08-29 DIAGNOSIS — M79.662 PAIN OF LEFT CALF: ICD-10-CM

## 2017-08-29 DIAGNOSIS — L65.8 FEMALE PATTERN BALDNESS: ICD-10-CM

## 2017-08-29 DIAGNOSIS — Z01.818 PREOPERATIVE EXAMINATION: ICD-10-CM

## 2017-08-29 DIAGNOSIS — I10 ESSENTIAL HYPERTENSION: ICD-10-CM

## 2017-08-29 DIAGNOSIS — E11.42 TYPE 2 DIABETES MELLITUS WITH DIABETIC POLYNEUROPATHY, UNSPECIFIED LONG TERM INSULIN USE STATUS: ICD-10-CM

## 2017-08-29 LAB
ESTIMATED AVERAGE GLUCOSE: 269 MG/DL
HBA1C MFR BLD: 11 %

## 2017-09-01 ENCOUNTER — OFFICE VISIT (OUTPATIENT)
Dept: INTERNAL MEDICINE | Age: 52
End: 2017-09-01
Attending: PODIATRIST

## 2017-09-01 DIAGNOSIS — E11.621 DIABETIC ULCER OF LEFT MIDFOOT ASSOCIATED WITH TYPE 2 DIABETES MELLITUS, WITH FAT LAYER EXPOSED (HCC): Primary | ICD-10-CM

## 2017-09-01 DIAGNOSIS — L97.422 DIABETIC ULCER OF LEFT MIDFOOT ASSOCIATED WITH TYPE 2 DIABETES MELLITUS, WITH FAT LAYER EXPOSED (HCC): Primary | ICD-10-CM

## 2017-09-08 ENCOUNTER — OFFICE VISIT (OUTPATIENT)
Dept: INTERNAL MEDICINE | Age: 52
End: 2017-09-08
Attending: PODIATRIST

## 2017-09-08 DIAGNOSIS — Z51.89 VISIT FOR WOUND CHECK: Primary | ICD-10-CM

## 2017-09-11 ENCOUNTER — TELEPHONE (OUTPATIENT)
Dept: INTERNAL MEDICINE | Age: 52
End: 2017-09-11

## 2017-09-11 LAB
ALBUMIN SERPL-MCNC: 4.1 G/DL (ref 3.4–5)
ANION GAP SERPL CALCULATED.3IONS-SCNC: 13 MMOL/L (ref 3–16)
BASOPHILS ABSOLUTE: 0 K/UL (ref 0–0.2)
BASOPHILS RELATIVE PERCENT: 0.5 %
BUN BLDV-MCNC: 15 MG/DL (ref 7–20)
CALCIUM SERPL-MCNC: 9.8 MG/DL (ref 8.3–10.6)
CHLORIDE BLD-SCNC: 100 MMOL/L (ref 99–110)
CHOLESTEROL, TOTAL: 134 MG/DL (ref 0–199)
CO2: 26 MMOL/L (ref 21–32)
CREAT SERPL-MCNC: 0.9 MG/DL (ref 0.6–1.1)
EOSINOPHILS ABSOLUTE: 0.2 K/UL (ref 0–0.6)
EOSINOPHILS RELATIVE PERCENT: 3 %
GFR AFRICAN AMERICAN: >60
GFR NON-AFRICAN AMERICAN: >60
GLUCOSE BLD-MCNC: 66 MG/DL (ref 70–99)
HCT VFR BLD CALC: 42.5 % (ref 36–48)
HEMOGLOBIN: 14.2 G/DL (ref 12–16)
LYMPHOCYTES ABSOLUTE: 2.3 K/UL (ref 1–5.1)
LYMPHOCYTES RELATIVE PERCENT: 28.8 %
MCH RBC QN AUTO: 31.2 PG (ref 26–34)
MCHC RBC AUTO-ENTMCNC: 33.4 G/DL (ref 31–36)
MCV RBC AUTO: 93.6 FL (ref 80–100)
MONOCYTES ABSOLUTE: 0.4 K/UL (ref 0–1.3)
MONOCYTES RELATIVE PERCENT: 5.1 %
NEUTROPHILS ABSOLUTE: 5.1 K/UL (ref 1.7–7.7)
NEUTROPHILS RELATIVE PERCENT: 62.6 %
PDW BLD-RTO: 15.1 % (ref 12.4–15.4)
PHOSPHORUS: 4.2 MG/DL (ref 2.5–4.9)
PLATELET # BLD: 285 K/UL (ref 135–450)
PMV BLD AUTO: 9.4 FL (ref 5–10.5)
POTASSIUM SERPL-SCNC: 5.7 MMOL/L (ref 3.5–5.1)
RBC # BLD: 4.54 M/UL (ref 4–5.2)
SODIUM BLD-SCNC: 139 MMOL/L (ref 136–145)
TSH REFLEX: 1.76 UIU/ML (ref 0.27–4.2)
WBC # BLD: 8.1 K/UL (ref 4–11)

## 2017-09-12 LAB
ESTIMATED AVERAGE GLUCOSE: 248.9 MG/DL
HBA1C MFR BLD: 10.3 %
HIV-1 AND HIV-2 ANTIBODIES: NORMAL

## 2017-09-15 ENCOUNTER — OFFICE VISIT (OUTPATIENT)
Dept: INTERNAL MEDICINE | Age: 52
End: 2017-09-15
Attending: PODIATRIST

## 2017-09-15 DIAGNOSIS — L97.422 DIABETIC ULCER OF LEFT MIDFOOT ASSOCIATED WITH TYPE 2 DIABETES MELLITUS, WITH FAT LAYER EXPOSED (HCC): Primary | ICD-10-CM

## 2017-09-15 DIAGNOSIS — E11.621 DIABETIC ULCER OF LEFT MIDFOOT ASSOCIATED WITH TYPE 2 DIABETES MELLITUS, WITH FAT LAYER EXPOSED (HCC): Primary | ICD-10-CM

## 2017-09-19 DIAGNOSIS — E11.42 DIABETIC POLYNEUROPATHY ASSOCIATED WITH TYPE 2 DIABETES MELLITUS (HCC): Chronic | ICD-10-CM

## 2017-09-19 RX ORDER — LISINOPRIL 10 MG/1
10 TABLET ORAL DAILY
Qty: 30 TABLET | Refills: 5
Start: 2017-09-19 | End: 2017-10-09

## 2017-09-22 ENCOUNTER — OFFICE VISIT (OUTPATIENT)
Dept: INTERNAL MEDICINE | Age: 52
End: 2017-09-22
Attending: PODIATRIST

## 2017-09-22 DIAGNOSIS — B35.3 TINEA PEDIS OF BOTH FEET: ICD-10-CM

## 2017-09-22 DIAGNOSIS — S91.309D OPEN WOUND OF FOOT, UNSPECIFIED LATERALITY, SUBSEQUENT ENCOUNTER: Primary | ICD-10-CM

## 2017-09-22 RX ORDER — TERBINAFINE HYDROCHLORIDE 250 MG/1
250 TABLET ORAL DAILY
Qty: 42 TABLET | Refills: 0 | Status: SHIPPED | OUTPATIENT
Start: 2017-09-22 | End: 2017-11-03

## 2017-09-25 DIAGNOSIS — E11.42 TYPE 2 DIABETES MELLITUS WITH DIABETIC POLYNEUROPATHY, WITH LONG-TERM CURRENT USE OF INSULIN (HCC): ICD-10-CM

## 2017-09-25 DIAGNOSIS — E11.42 DIABETIC POLYNEUROPATHY ASSOCIATED WITH TYPE 2 DIABETES MELLITUS (HCC): Chronic | ICD-10-CM

## 2017-09-25 DIAGNOSIS — Z79.4 TYPE 2 DIABETES MELLITUS WITH DIABETIC POLYNEUROPATHY, WITH LONG-TERM CURRENT USE OF INSULIN (HCC): ICD-10-CM

## 2017-09-25 RX ORDER — AMITRIPTYLINE HYDROCHLORIDE 100 MG/1
100 TABLET, FILM COATED ORAL NIGHTLY
Qty: 30 TABLET | Refills: 1 | Status: SHIPPED | OUTPATIENT
Start: 2017-09-25 | End: 2017-09-25 | Stop reason: SDUPTHER

## 2017-09-25 RX ORDER — NICOTINE POLACRILEX 4 MG/1
GUM, CHEWING ORAL
Refills: 4 | COMMUNITY
Start: 2017-07-18 | End: 2017-10-16 | Stop reason: SDUPTHER

## 2017-09-25 RX ORDER — AMITRIPTYLINE HYDROCHLORIDE 100 MG/1
100 TABLET, FILM COATED ORAL NIGHTLY
Qty: 30 TABLET | Refills: 3 | Status: SHIPPED | OUTPATIENT
Start: 2017-09-25 | End: 2017-12-26

## 2017-10-02 ENCOUNTER — OFFICE VISIT (OUTPATIENT)
Dept: INTERNAL MEDICINE | Age: 52
End: 2017-10-02
Attending: INTERNAL MEDICINE

## 2017-10-02 DIAGNOSIS — Z79.4 TYPE 2 DIABETES MELLITUS WITH DIABETIC POLYNEUROPATHY, WITH LONG-TERM CURRENT USE OF INSULIN (HCC): Primary | ICD-10-CM

## 2017-10-02 DIAGNOSIS — E11.42 TYPE 2 DIABETES MELLITUS WITH DIABETIC POLYNEUROPATHY, WITH LONG-TERM CURRENT USE OF INSULIN (HCC): Primary | ICD-10-CM

## 2017-10-02 NOTE — MR AVS SNAPSHOT
omeprazole 20 MG EC tablet TK 1 T PO D    metFORMIN (GLUCOPHAGE) 500 MG tablet Take 2 tablets by mouth 2 times daily (with meals)    amitriptyline (ELAVIL) 100 MG tablet Take 1 tablet by mouth nightly    terbinafine (LAMISIL) 250 MG tablet Take 1 tablet by mouth daily    lisinopril (PRINIVIL) 10 MG tablet Take 1 tablet by mouth daily    amLODIPine (NORVASC) 10 MG tablet Take 0.5 tablets by mouth daily    nicotine polacrilex (NICORETTE) 2 MG gum Take 1 each by mouth as needed for Smoking cessation    nicotine (NICODERM CQ) 14 MG/24HR Place 1 patch onto the skin every 24 hours    Blood Glucose Monitoring Suppl (TRUE METRIX AIR GLUCOSE METER) LASHON 1 Units by Does not apply route 2 times daily    glucose blood VI test strips (TRUE METRIX BLOOD GLUCOSE TEST) strip 1 each by In Vitro route 2 times daily As needed. Lancets MISC 1 each by Does not apply route 2 times daily PHARMACY MAY SUBSTITUTE TO TRUE METRIX LANCETS    gabapentin (NEURONTIN) 600 MG tablet Take 1 tablet by mouth 3 times daily    omeprazole (PRILOSEC) 20 MG delayed release capsule Take 1 capsule by mouth Daily    atorvastatin (LIPITOR) 20 MG tablet Take 1 tablet by mouth daily    Accu-Chek Softclix Lancets MISC 1 strip by Does not apply route 2 times daily Check before breakfast and before going to bed    glucose blood VI test strips (ACCU-CHEK BARBIE) strip 1 each by In Vitro route daily As needed. Blood Glucose Monitoring Suppl (ACCU-CHEK BARBIE PLUS) W/DEVICE KIT 1 kit by Does not apply route 2 times daily    buPROPion (WELLBUTRIN SR) 150 MG extended release tablet Take 1 tablet by mouth 2 times daily    glucose blood VI test strips (FREESTYLE TEST STRIPS) strip 1 each by In Vitro route daily As needed.     Insulin Pen Needle 31G X 5 MM MISC 1 each by Does not apply route daily    Heat Wraps (THERMACARE BACK/HIP) MISC 1 patch by Does not apply route as needed (back pain)    Insulin Syringe-Needle U-100 30G X 5/16\" 0.5 ML MISC 1 each by Does not apply route daily    METHADONE HCL PO Take 146 mg by mouth daily      Allergies              Sulfa Antibiotics       We Ordered/Performed the following           Fredrick Perera MD     Scheduling Instructions:    Endocrinology, Cholesterol, and Diabetes- Tori Mendoza MD  Genoveva Chan, [address] [place] [address], [facility] [address]  Phone: [phone]    One of the many advantages of the [address] [place] [address] is that we all work together closely to make healthcare easy for you. We have contacted the physician practice to inform them we have referred you. For your convenience, their office should call you within a few days to schedule   an appointment, but if you would like to call them sooner their information is above. Comments: The patient can be scheduled with any member of the group, including the provider with the first available appointments. Additional Information        Basic Information     Date Of Birth Sex Race Ethnicity Preferred Language    1965 Female White Non-/Non  English      Problem List as of 10/2/2017  Date Reviewed: 9/1/2017                Open wound of foot    Pain medication agreement signed    Diabetic ulcer of left foot associated with type 2 diabetes mellitus ([address]    Obesity (BMI 30-39. 9)    Burn    Tinea pedis    Depressive disorder, not elsewhere classified    Anxiety state    Onychomycosis    Hypoxia    Acute pancreatitis    Pneumonia    Nicotine addiction    Feet clawing    Diabetic neuropathy (HCC)    Tinea pedis    HTN (hypertension)    Amenorrhea    Anomalies of nails    Dyslipidemia    Dyspareunia    Neuropathy (HCC)    Bacterial vaginosis    Pain in back    Tobacco abuse    Scalp lesion    Diabetes mellitus, type II (HCC)    Vaginal bleeding, abnormal    Carpal tunnel syndrome    ETOH abuse    Pseudocyst, pancreas    Pancreatitis    Asthma    Pain, eye, right    DVT (deep venous thrombosis) ([address] Immunizations as of 10/2/2017     Name Date    Influenza Virus Vaccine 10/16/2015, 9/12/2014, 11/8/2011    Influenza, Bj Border, 3 yrs and older, IM, Preservative Free 11/10/2016    Pneumococcal Polysaccharide ([id number]) 11/13/2015    Tdap (Boostrix, Adacel) 7/22/2014      Preventive Care        Date Due    Colonoscopy 7/22/2013    Eye Exam By An Eye Doctor 8/28/2016    Diabetic Foot Exam 2/1/2017    Urine Check For Kidney Problems 4/15/2017    Yearly Flu Vaccine (1) 9/1/2017    Mammograms are recommended every 2 years for low/average risk patients aged 48 - 69, and every year for high risk patients per updated national guidelines. However these guidelines can be individualized by your provider. 9/10/2017    Hemoglobin A1C (Test For Long-Term Glucose Control) 12/11/2017    Cholesterol Screening 9/11/2018    Pap Smear 4/15/2019    Tetanus Combination Vaccine (2 - Td) 7/22/2024            MyChart Signup           [employer] allows you to send messages to your doctor, view your test results, renew your prescriptions, schedule appointments, view visit notes, and more. How Do I Sign Up? 1. In your Internet browser, go to [email or web address]  2. Click on the Sign Up Now link in the Sign In box. You will see the New Member Sign Up page. 3. Enter your [employer] Access Code exactly as it appears below. You will not need to use this code after youve completed the sign-up process. If you do not sign up before the expiration date, you must request a new code. [employer] Access Code: [email or web address]  Expires: 11/14/2017  3:14 PM    4. Enter your Social Security Number ([id number]) and Date of Birth (mm/dd/yyyy) as indicated and click Submit. You will be taken to the next sign-up page. 5. Create a [employer] ID. This will be your [employer] login ID and cannot be changed, so think of one that is secure and easy to remember. 6. Create a [employer] password. You can change your password at any time. 7. Enter your Password Reset Question and Answer. This can be used at a later time if you forget your password. 8. Enter your e-mail address. You will receive e-mail notification when new information is available in [address] 9. Click Sign Up. You can now view your medical record. Additional Information  If you have questions, please contact the physician practice where you receive care. Remember, [employer] is NOT to be used for urgent needs. For medical emergencies, dial 911. For questions regarding your [employer] account call [phone]. If you have a clinical question, please call your doctor's office.

## 2017-10-02 NOTE — PROGRESS NOTES
left foot    PRE-MALIGNANT / BENIGN SKIN LESION EXCISION  7/7003    cryotherapy done on lesion    TOE AMPUTATION Left 02/24/2017    AMPUTATION LEFT GREAT TOE                    Family History:   No family history on file. Social History:   TOBACCO:   reports that she has been smoking Cigarettes. She has a 15.00 pack-year smoking history. She has never used smokeless tobacco.  ETOH:   has no alcohol history on file. OCCUPATION:      Allergies:  Sulfa antibiotics    Current Medications:    Prior to Admission medications    Medication Sig Start Date End Date Taking? Authorizing Provider   omeprazole 20 MG EC tablet TK 1 T PO D 7/18/17   Historical Provider, MD   metFORMIN (GLUCOPHAGE) 500 MG tablet Take 2 tablets by mouth 2 times daily (with meals) 9/25/17   Karan Kaufman MD   amitriptyline (ELAVIL) 100 MG tablet Take 1 tablet by mouth nightly 9/25/17   Mortimer Hum, MD   terbinafine (LAMISIL) 250 MG tablet Take 1 tablet by mouth daily 9/22/17 11/3/17  Aakash Goodson DPM   lisinopril (PRINIVIL) 10 MG tablet Take 1 tablet by mouth daily 9/19/17   Mortimer Hum, MD   amLODIPine (NORVASC) 10 MG tablet Take 0.5 tablets by mouth daily 8/28/17   Luis Robertson MD   nicotine polacrilex (NICORETTE) 2 MG gum Take 1 each by mouth as needed for Smoking cessation 8/28/17   Luis Robertson MD   nicotine (NICODERM CQ) 14 MG/24HR Place 1 patch onto the skin every 24 hours 8/21/17   Luis Robertson MD   Blood Glucose Monitoring Suppl (TRUE METRIX AIR GLUCOSE METER) LASHON 1 Units by Does not apply route 2 times daily 8/16/17   Jennifer Hernandez MD   glucose blood VI test strips (TRUE METRIX BLOOD GLUCOSE TEST) strip 1 each by In Vitro route 2 times daily As needed.  8/16/17   Jennifer Hernandez MD   Lancets MISC 1 each by Does not apply route 2 times daily PHARMACY MAY SUBSTITUTE TO TRUE METRIX LANCETS 8/16/17   Jennifer Hernandez MD   gabapentin (NEURONTIN) 600 MG tablet Take 1 tablet by mouth 3 times daily 8/4/17   Riya Nunn MD omeprazole (PRILOSEC) 20 MG delayed release capsule Take 1 capsule by mouth Daily 7/14/17   Giacomo Carver MD   atorvastatin (LIPITOR) 20 MG tablet Take 1 tablet by mouth daily 5/30/17   Merrily Gosselin, MD   insulin glargine (LANTUS SOLOSTAR) 100 UNIT/ML injection pen Inject 50 Units into the skin 2 times daily 4/26/17   Alaina Pressley MD   Accu-Chek Softclix Lancets MISC 1 strip by Does not apply route 2 times daily Check before breakfast and before going to bed 4/20/17   Maryan Dorantes MD   glucose blood VI test strips (ACCU-CHEK BARBIE) strip 1 each by In Vitro route daily As needed. 4/20/17   Maryan Dorantes MD   Blood Glucose Monitoring Suppl (ACCU-CHEK BARBEI PLUS) W/DEVICE KIT 1 kit by Does not apply route 2 times daily 4/20/17   Maryan Dorantes MD   buPROPion [facility]) 150 MG extended release tablet Take 1 tablet by mouth 2 times daily 3/27/17   Brooklyn Loredo MD   glucose blood VI test strips (FREESTYLE TEST STRIPS) strip 1 each by In Vitro route daily As needed. 2/2/17   Maryan Dorantes MD   Insulin Pen Needle 31G X 5 MM MISC 1 each by Does not apply route daily 2/2/17   Maryan Dorantes MD   Heat Wraps [facility] NEHAL BACK/HIP) MISC 1 patch by Does not apply route as needed (back pain) 9/1/16   Maryan Dorantes MD   Insulin Syringe-Needle U-100 30G X 5/16\" 0.5 ML MISC 1 each by Does not apply route daily 9/1/16   Maryan Dorantes MD   METHADONE HCL PO Take 146 mg by mouth daily    Historical Provider, MD       ROS     As per HPI; otherwise negative on 10 point review    Physical Exam:      Vitals: There were no vitals taken for this visit. There is no height or weight on file to calculate BMI.   Wt Readings from Last 3 Encounters:   08/28/17 199 lb (90.3 kg)   05/04/17 195 lb 3.2 oz (88.5 kg)   02/24/17 188 lb 12.8 oz (85.6 kg)       Physical Exam     Gen - no acute distress; sitting in chair  Neuro - A&Ox3; no focal deficits on gross exam; foot exam not performed as both feet dressed and

## 2017-10-02 NOTE — PROGRESS NOTES
[facility], [place], operated by [facility]       NURSING PROGRESS NOTE      October 2, 2017  Bala Benitez    Chief Complaint:   Chief Complaint   Patient presents with    Other     Pt here in POD states her glucoses are ranging very low to very high        Constitutional: negative  Eyes: negative  Ears, nose, mouth, throat, and face: negative  Respiratory: negative  Cardiovascular: negative  Gastrointestinal: negative  Genitourinary: negative  Integument/breast: positive for skin lesion(s) both feet   Hematologic/lymphatic: negative  Musculoskeletal: negative  Neurological: negative  Diabetes: Yes  Yes:  Medication compliance: states she is compliant   Diabetic diet compliance:  noncompliant some of the time  Home glucose monitoring:  is performed regularly  Last eye exam unsure needs exam  Acute symptoms hyperglycemia:  Pt notes high glucoses on meter but does not complain of symptoms   Acute symptoms hypoglycemia: Pt notes low  glucoses on meter but does not complain of symptoms    Brought meter to visit     Pain Assessment:  Pain Present: no  Pain Score: 0      Mobility/Safety/Self-Care:  Steady Gait: Yes  History of Falls: No   History of a Fall within the last 30 days No  Assistive Device:No    Poor Hygiene: No    Psychosocial:   Depression: No  If YES,    Does Patient express current thoughts of harming self or others?: No  Anxious: Yes  Insomnia: No  Inappropriate Behavior: No but states she is frustrated with health issues   Alcohol Abuse: no  Substance Abuse: no  Signs of Physical Abuse: No  Signs of Emotional Abuse: No    Educational:  Identify the learner who is being assessed for education:  patient                    Ability to Learn:  Exhibits ability to grasp concepts and respond to questions: Medium  Ready to Learn: Yes  anxious   Preferred Method of Learning:  verbal  Barriers to Learning:  Active attentive participant  Special Considerations due to cultural, Yarsanism, spiritual beliefs:  No  Language: English  :  No    Note:   Urgent focus visit for glucoses.  Here in Podiatry also - see note- attempting to get cleared for surgery       1:33 PM 10/2/2017

## 2017-10-03 DIAGNOSIS — Z79.4 TYPE 2 DIABETES MELLITUS WITH DIABETIC POLYNEUROPATHY, WITH LONG-TERM CURRENT USE OF INSULIN (HCC): ICD-10-CM

## 2017-10-03 DIAGNOSIS — E11.42 TYPE 2 DIABETES MELLITUS WITH DIABETIC POLYNEUROPATHY, WITH LONG-TERM CURRENT USE OF INSULIN (HCC): ICD-10-CM

## 2017-10-03 RX ORDER — OMEPRAZOLE 20 MG/1
20 CAPSULE, DELAYED RELEASE ORAL DAILY
Qty: 30 CAPSULE | Refills: 2
Start: 2017-10-03 | End: 2017-11-27 | Stop reason: SDUPTHER

## 2017-10-09 ENCOUNTER — OFFICE VISIT (OUTPATIENT)
Dept: INTERNAL MEDICINE | Age: 52
End: 2017-10-09
Attending: PODIATRIST

## 2017-10-09 ENCOUNTER — OFFICE VISIT (OUTPATIENT)
Dept: INTERNAL MEDICINE | Age: 52
End: 2017-10-09
Attending: STUDENT IN AN ORGANIZED HEALTH CARE EDUCATION/TRAINING PROGRAM

## 2017-10-09 VITALS
WEIGHT: 198 LBS | RESPIRATION RATE: 20 BRPM | OXYGEN SATURATION: 95 % | HEIGHT: 62 IN | TEMPERATURE: 98.5 F | SYSTOLIC BLOOD PRESSURE: 174 MMHG | HEART RATE: 94 BPM | DIASTOLIC BLOOD PRESSURE: 78 MMHG | BODY MASS INDEX: 36.44 KG/M2

## 2017-10-09 DIAGNOSIS — E11.42 TYPE 2 DIABETES MELLITUS WITH DIABETIC POLYNEUROPATHY, WITH LONG-TERM CURRENT USE OF INSULIN (HCC): ICD-10-CM

## 2017-10-09 DIAGNOSIS — S91.309D OPEN WOUND OF FOOT, UNSPECIFIED LATERALITY, SUBSEQUENT ENCOUNTER: ICD-10-CM

## 2017-10-09 DIAGNOSIS — E11.42 TYPE 2 DIABETES MELLITUS WITH DIABETIC POLYNEUROPATHY, UNSPECIFIED LONG TERM INSULIN USE STATUS: ICD-10-CM

## 2017-10-09 DIAGNOSIS — S91.301D WOUND, OPEN, FOOT, RIGHT, SUBSEQUENT ENCOUNTER: ICD-10-CM

## 2017-10-09 DIAGNOSIS — S91.301D WOUND, OPEN, FOOT, RIGHT, SUBSEQUENT ENCOUNTER: Primary | ICD-10-CM

## 2017-10-09 DIAGNOSIS — Z79.4 TYPE 2 DIABETES MELLITUS WITH DIABETIC POLYNEUROPATHY, WITH LONG-TERM CURRENT USE OF INSULIN (HCC): ICD-10-CM

## 2017-10-09 DIAGNOSIS — I10 ESSENTIAL HYPERTENSION: ICD-10-CM

## 2017-10-09 DIAGNOSIS — E87.5 HYPERKALEMIA: ICD-10-CM

## 2017-10-09 LAB
ALBUMIN SERPL-MCNC: 4.1 G/DL (ref 3.4–5)
ANION GAP SERPL CALCULATED.3IONS-SCNC: 13 MMOL/L (ref 3–16)
BUN BLDV-MCNC: 16 MG/DL (ref 7–20)
CALCIUM SERPL-MCNC: 9.8 MG/DL (ref 8.3–10.6)
CHLORIDE BLD-SCNC: 101 MMOL/L (ref 99–110)
CO2: 27 MMOL/L (ref 21–32)
CREAT SERPL-MCNC: 0.8 MG/DL (ref 0.6–1.1)
GFR AFRICAN AMERICAN: >60
GFR NON-AFRICAN AMERICAN: >60
GLUCOSE BLD-MCNC: 62 MG/DL (ref 70–99)
GLUCOSE BLD-MCNC: 92 MG/DL (ref 70–99)
HCT VFR BLD CALC: 42.9 % (ref 36–48)
HEMOGLOBIN: 14.2 G/DL (ref 12–16)
MCH RBC QN AUTO: 30.9 PG (ref 26–34)
MCHC RBC AUTO-ENTMCNC: 33.2 G/DL (ref 31–36)
MCV RBC AUTO: 92.9 FL (ref 80–100)
PDW BLD-RTO: 14.5 % (ref 12.4–15.4)
PERFORMED ON: NORMAL
PHOSPHORUS: 1.8 MG/DL (ref 2.5–4.9)
PLATELET # BLD: 287 K/UL (ref 135–450)
PMV BLD AUTO: 9.1 FL (ref 5–10.5)
POTASSIUM SERPL-SCNC: 4.9 MMOL/L (ref 3.5–5.1)
RBC # BLD: 4.61 M/UL (ref 4–5.2)
SEDIMENTATION RATE, ERYTHROCYTE: 32 MM/HR (ref 0–30)
SODIUM BLD-SCNC: 141 MMOL/L (ref 136–145)
WBC # BLD: 9 K/UL (ref 4–11)

## 2017-10-09 RX ORDER — AMLODIPINE BESYLATE 10 MG/1
10 TABLET ORAL DAILY
Qty: 30 TABLET | Refills: 1 | Status: SHIPPED | OUTPATIENT
Start: 2017-10-09 | End: 2017-12-04 | Stop reason: SDUPTHER

## 2017-10-09 RX ORDER — CLINDAMYCIN HYDROCHLORIDE 300 MG/1
300 CAPSULE ORAL 3 TIMES DAILY
Qty: 30 CAPSULE | Refills: 0 | Status: SHIPPED | OUTPATIENT
Start: 2017-10-09 | End: 2017-10-19

## 2017-10-09 RX ORDER — CIPROFLOXACIN 500 MG/1
500 TABLET, FILM COATED ORAL 2 TIMES DAILY
Qty: 20 TABLET | Refills: 0 | Status: SHIPPED | OUTPATIENT
Start: 2017-10-09 | End: 2017-10-19

## 2017-10-09 ASSESSMENT — ENCOUNTER SYMPTOMS
SORE THROAT: 0
SHORTNESS OF BREATH: 0
SPUTUM PRODUCTION: 0
COUGH: 0
PHOTOPHOBIA: 0
NAUSEA: 0
ABDOMINAL PAIN: 0
VOMITING: 0
ORTHOPNEA: 0

## 2017-10-09 NOTE — PROGRESS NOTES
.  Department of Podiatry  Resident Progress Note    Chapo Gonzales  Allergies: Sulfa antibiotics    SUBJECTIVE  The patient is a 45 y.o. female who presents to clinic today for follow up of a bilateral foot wound. Patient is wearing her CAM boot and post-op shoe. Patient states wants to schedule surgery as she states oSw told her ti would be scheduled in 2wks.  has been working on glucose control and had lows in the 40's, and highs in the 320'  Denies f/c/n/v/cp. Past Medical History:         Diagnosis Date    Amenorrhea     Anomalies of nails     Asthma 5/14/04    Athlete's foot 8/2010    Bacterial vaginosis 04/2008    Carpal tunnel syndrome 5/2007    Diabetes mellitus type II 08/2007    Diabetic neuropathy ([address] 8/10    DVT (deep venous thrombosis) ([address] 3/2004    Dyslipidemia 5/2009    Dyspareunia 05/2009    ETOH abuse 3/04/2007    Feet clawing     HTN (hypertension)     Neuropathy ([address] 05/2009    polyneuropathy    Pain, back 04/2008    Pain, eye, right 5/14/04    Pancreatitis 5/14/04    Pseudocyst, pancreas 5/14/04    Scalp lesion 08/2007    Tinea pedis     Tobacco abuse 03/2008    Vaginal bleeding, abnormal 6/2007       REVIEW OF SYSTEMS:  CONSTITUTIONAL:  negative  RESPIRATORY:  negative  CARDIOVASCULAR:  negative  GASTROINTESTINAL:  negative  MUSCULOSKELETAL:  negative  NEUROLOGICAL:  negative    OBJECTIVE  VASCULAR: DP and PT pulses are palpable 1/4 b/l. CFT is brisk to the digits of the foot b/l. Skin temperature is warm to cool from proximal to distal with no focal calor noted. Mild edema noted to the lower extremities b/l, L>R. No varicosities noted. NEUROLOGIC: Gross and epicritic sensation is intact b/l.      DERMATOLOGIC:     LEFT:   Full thickness ulceration  At plantar aspect of 1st metatarsal head measuring:   (6/26): 2.0 cm x 2.0 cm x 0.2 cm    (7/14): 2.0 cm x 1.9 cm x 0.2 cm  (7/21): 2.0 cm x 2.0 cm x 0.2 cm  (7/28): 1.8 cm x 1.9 cm x 0.2 cm  (8/04): 2 cm x 2 blood sugar level. - Discuss MRI  - Gave rx for Cipro and Clinda for 10 days.   - Plan to do SONNY in future when BS is under control per medicine  - RTC  in one week.       Natalya Carvalho DPM   Podiatric Resident, PGY-1  Pager: [phone]  10/9/2017, 10:11 AM

## 2017-10-09 NOTE — PATIENT INSTRUCTIONS
Return to [facility] 1 week  Start Cleocin and Cipro as directed  Change dressing only if it becomes dirty or wet       clindamycin (oral/injection)  Pronunciation:  Evan Lizbet nish  Brand:  Cleocin HCl, Cleocin Pediatric, Cleocin Phosphate  What is the most important information I should know about clindamycin? Clindamycin can cause diarrhea, which may be severe or lead to serious, life-threatening intestinal problems. If you have diarrhea that is watery or bloody, stop using clindamycin and call your doctor. What is clindamycin? Clindamycin is an antibiotic that fights bacteria in the body. Clindamycin is used to treat serious infections caused by bacteria. Clindamycin may also be used for purposes not listed in this medication guide. What should I discuss with my healthcare provider before using clindamycin? You should not use this medicine if you are allergic to clindamycin or lincomycin. To make sure clindamycin is safe for you, tell your doctor if you have:  · a history of colitis, Crohn's disease, or other intestinal disorder;  · a history of eczema, or allergic skin reaction;  · liver disease;  · asthma, or a history of severe allergic reaction to aspirin;  · if you also take erythromycin; or  · if you are allergic to yellow food dye. Clindamycin is not expected to harm an unborn baby. Tell your doctor if you are pregnant or plan to become pregnant during treatment. Clindamycin can pass into breast milk and may harm a nursing baby. You should not breast-feed while using this medicine. Do not give a clindamycin injection to a child without medical advice. Injectable clindamycin contains an ingredient that can cause serious side effects or death in very young infants or premature babies. Do not allow an older child to use this medicine without supervision of an adult. How should I use clindamycin? Follow all directions on your prescription label.  Do not use this medicine in larger or smaller amounts or for longer than recommended. Take the capsule with a full glass of water to keep it from irritating your throat. Measure the oral liquid with the dosing syringe provided, or with a special dose-measuring spoon or medicine cup. If you do not have a dose-measuring device, ask your pharmacist for one. Clindamycin is sometimes given as an injection into a muscle, or injected into a vein through an IV. You may be shown how to use injections at home. Do not self-inject this medicine if you do not understand how to give the injection and properly dispose of used needles, IV tubing, and other items used to inject the medicine. Use a disposable needle only once. Follow any state or local laws about throwing away used needles and syringes. Use a puncture-proof \"sharps\" disposal container (ask your pharmacist where to get one and how to throw it away). Keep this container out of the reach of children and pets. To make sure this medicine is not causing harmful effects, you may need frequent medical tests during treatment. Use this medicine for the full prescribed length of time. Your symptoms may improve before the infection is completely cleared. Skipping doses may also increase your risk of further infection that is resistant to antibiotics. Clindamycin will not treat a viral infection such as the flu or a common cold. Store at room temperature away from moisture and heat. Protect the injectable medicine from high heat. Do not store the oral liquid in the refrigerator. Throw away any unused oral liquid after 2 weeks. What happens if I miss a dose? Use the missed dose as soon as you remember. Skip the missed dose if it is almost time for your next scheduled dose. Do not  use extra medicine to make up the missed dose. What happens if I overdose? Seek emergency medical attention or call the Poison Help line at 1-399.817.3394. What should I avoid while using clindamycin?   Antibiotic medicines can cause clindamycin. Remember, keep this and all other medicines out of the reach of children, never share your medicines with others, and use this medication only for the indication prescribed. Every effort has been made to ensure that the information provided by [address] is accurate, up-to-date, and complete, but no guarantee is made to that effect. Drug information contained herein may be time sensitive. [facility] information has been compiled for use by healthcare practitioners and consumers in the [place] and therefore [facility] does not warrant that uses outside of the [place] are appropriate, unless specifically indicated otherwise. [facility]'s drug information does not endorse drugs, diagnose patients or recommend therapy. [facility]'s drug information is an informational resource designed to assist licensed healthcare practitioners in caring for their patients and/or to serve consumers viewing this service as a supplement to, and not a substitute for, the expertise, skill, knowledge and judgment of healthcare practitioners. The absence of a warning for a given drug or drug combination in no way should be construed to indicate that the drug or drug combination is safe, effective or appropriate for any given patient. [facility] does not assume any responsibility for any aspect of healthcare administered with the aid of information [facility] provides. The information contained herein is not intended to cover all possible uses, directions, precautions, warnings, drug interactions, allergic reactions, or adverse effects. If you have questions about the drugs you are taking, check with your doctor, nurse or pharmacist.  Copyright 3633-2527 [address] Version: 10.01. Revision date: 4/18/2016. Care instructions adapted under license by [facility] ([facility]).  If you have questions about a medical condition or this instruction, always ask your healthcare professional. [address] any cure and that costs more to treat. How can you take antibiotics safely? Be safe with medicine. Take your antibiotics as directed. Do not stop taking them just because you feel better. You need to take the full course of medicine. This will help make sure your infection is cured. It will also help prevent the growth of antibiotic-resistant bacteria. Always take the exact amount that the label says to take. If the label says to take the medicine at a certain time, follow those directions. You might feel better after you take an antibiotic for a few days. But it is important to keep taking it for as long as prescribed. That will help you get rid of those bacteria that are a bit stronger and that survive the first few days of treatment. Where can you learn more? Go to https://[employer]. org and sign in to your [employer] account. Enter O759 in the [employer] box to learn more about \"Learning About the Safe Use of Antibiotics. \"     If you do not have an account, please click on the \"Sign Up Now\" link. Current as of: March 3, 2017  Content Version: 11.3  © 2812-5335 [employer]. Care instructions adapted under license by [facility] ([facility]). If you have questions about a medical condition or this instruction, always ask your healthcare professional. Bety Booker any warranty or liability for your use of this information.

## 2017-10-09 NOTE — PROGRESS NOTES
Department Of Internal Medicine  General Medicine/Primary Care  Established Patient Visit    Patient:  Fang Hardy                                               : 1965  Age: 45 y.o. MRN: [id number]  Date : 10/9/2017    History Obtained From:  Patient, EMR    REASON FOR VISIT:  Glucose check    HISTORY OF PRESENT ILLNESS:   Fang Hardy is a 45 y.o. female h/o DM2 on insulin with neuropathy who presents for f/u after her insulin regimen was adjusted last week. On her new regimen (reduced dose to 35 units BID of lantus from 50), her sugars have been between 100-230, no episodes of hypoglycemia. Upset about her foot issues, but no fever, chills, nausea, vomiting, shortness of breath. Past Medical History:        Diagnosis Date    Amenorrhea     Anomalies of nails     Asthma 04    Athlete's foot 2010    Bacterial vaginosis 2008    Carpal tunnel syndrome 2007    Diabetes mellitus type II 2007    Diabetic neuropathy [address] 8/10    DVT (deep venous thrombosis) ([facility] 3/2004    Dyslipidemia 2009    Dyspareunia 2009    ETOH abuse 3/04/2007    Feet clawing     HTN (hypertension)     Neuropathy ([facility] 2009    polyneuropathy    Pain, back 2008    Pain, eye, right 04    Pancreatitis 04    Pseudocyst, pancreas 04    Scalp lesion 2007    Tinea pedis     Tobacco abuse 2008    Vaginal bleeding, abnormal 2007       Past Surgical History:        Procedure Laterality Date     SECTION  unknown    OTHER SURGICAL HISTORY Left 2016    I & D left foot    PRE-MALIGNANT / BENIGN SKIN LESION EXCISION  7003    cryotherapy done on lesion    TOE AMPUTATION Left 2017    AMPUTATION LEFT GREAT TOE                    Family History:   No family history on file. Social History:   TOBACCO:   reports that she has been smoking Cigarettes. She has a 15.00 pack-year smoking history.  She has never used smokeless tobacco.  ETOH:   has no alcohol

## 2017-10-09 NOTE — PROGRESS NOTES
[facility]       NURSING PROGRESS NOTE      October 9, 2017  Zaina Rao    Chief Complaint:   Chief Complaint   Patient presents with    Wound Check       Constitutional: alert and oriented; calm;  just seen in [address] Patient here for evaluation of blood sugars that must be under control for planned foot surgery. Eyes: negative  Ears, nose, mouth, throat, and face: negative  Respiratory: negative  Cardiovascular: smokes less than 1 ppd  Gastrointestinal: negative  Genitourinary: negative  Integument/breast: negative  Hematologic/lymphatic: negative  Musculoskeletal: patient seen weekly in [address] for bilat wounds lower extremities; bilat lower legs are Ace wrapped. Neurological: negative  Diabetes: Yes   States she is taking Lantus 35 units BID                   Yes:   Medication compliance:  compliant all of the time  Diabetic diet compliance:  compliant all of the time  Home glucose monitoring:  is performed regularly  Last eye exam: unknown  Acute symptoms hyperglycemia:  none  States blood sugars have ranged from low 40s to over 320  Acute symptoms hypoglycemia: hunger  POC glucose today: 92 here in clinic today    Pain Assessment:  Pain Present: no  Pain Score: 0  Pain Quality/Description: no c/o pain    Mobility/Safety/Self-Care:  Steady Gait: Yes  History of Falls: No   History of a Fall within the last 30 days No  Assistive Device: cane       Poor Hygiene: No    Psychosocial:   Depression: No  If YES,    Does Patient express current thoughts of harming self or others?: No  Anxious: No  Insomnia: No  Inappropriate Behavior: No  Alcohol Abuse: no  Substance Abuse: no  Signs of Physical Abuse: No  Signs of Emotional Abuse: No    Educational:  Identify the learner who is being assessed for education:  patient                    Ability to Learn:  Exhibits ability to grasp concepts and respond to questions: High  Ready to Learn: Yes  calm   Preferred Method of Learning: written  Barriers to Learning: Verbalizes interest  Special Considerations due to cultural, Scientology, spiritual beliefs:  No  Language:  English  :  No    Note:         12:25 PM 10/9/2017

## 2017-10-09 NOTE — PROGRESS NOTES
OUTPATIENT SPECIALTY PODIATRY VISIT      Nursing Progress Note      October 9, 2017  Bala Benitez    Patient description of the problem: follow up foot ulcer bilateral     Observations: dressing on clean and dry    Ambulates: without assistance    Gait: Normal     Assistive Devices: straight cane    Fall History: No    Foot Hygiene: good    Foot Wear Proper: Yes    Impaired Skin Integrity: Yes      Pain Assessment:  Pain Present: yes  Pain Score: 6/10  Pain Quality/Description: Aching  Pain Onset: long time   ago  Pain Goal of patient: 0/10    Education Assessment:    Identify the learner who is being assessed for education:  patient                    Ability to Learn:  Exhibits ability to grasp concepts and respond to questions: Medium  Ready to Learn: Yes  calm   Preferred Method of Learning:  written  Barriers to Learning: Verbalizes interest  Special Considerations due to cultural, Samaritan, spiritual beliefs:  No  Language:  English  :  No    Gutierrez Cazares Page  10:02 AM 10/9/2017

## 2017-10-09 NOTE — PATIENT INSTRUCTIONS
Call your pharmacy for medication refills at least 48 hours ahead at [phone] (Monday -Friday, 08:00 AM to 4:00 PM .If you become ill when the clinic is closed, please call [facility]  at [phone] and ask the  to page the AOD between 6:00 AM and 6:00 PM or page the AON between 6:00 PM & 6:00 AM.    The clinic is not able to process [facility] requests for appointments or messages including refill requests. Please call the [facility] [employer] at [phone] for appointments and messages.      [facility] [employer] [address]  Telephone:[phone]    Instructions reviewed with patient and copy given to patient upon discharge by Gina Wynn H.RN  Return to clinic in 1 week this Monday after [address] done as ordered   Norvasc changed to 10 mg tablets once a day     11:10 AM10/9/2017

## 2017-10-16 ENCOUNTER — OFFICE VISIT (OUTPATIENT)
Dept: INTERNAL MEDICINE | Age: 52
End: 2017-10-16
Attending: INTERNAL MEDICINE

## 2017-10-16 ENCOUNTER — OFFICE VISIT (OUTPATIENT)
Dept: INTERNAL MEDICINE | Age: 52
End: 2017-10-16
Attending: PODIATRIST

## 2017-10-16 VITALS
RESPIRATION RATE: 16 BRPM | SYSTOLIC BLOOD PRESSURE: 157 MMHG | BODY MASS INDEX: 37.73 KG/M2 | TEMPERATURE: 98 F | DIASTOLIC BLOOD PRESSURE: 85 MMHG | HEIGHT: 62 IN | HEART RATE: 97 BPM | WEIGHT: 205 LBS | OXYGEN SATURATION: 96 %

## 2017-10-16 DIAGNOSIS — E11.42 TYPE 2 DIABETES MELLITUS WITH DIABETIC POLYNEUROPATHY, WITH LONG-TERM CURRENT USE OF INSULIN (HCC): Primary | ICD-10-CM

## 2017-10-16 DIAGNOSIS — Z79.4 TYPE 2 DIABETES MELLITUS WITH DIABETIC POLYNEUROPATHY, WITH LONG-TERM CURRENT USE OF INSULIN (HCC): Primary | ICD-10-CM

## 2017-10-16 DIAGNOSIS — E11.621 DIABETIC ULCER OF LEFT MIDFOOT ASSOCIATED WITH TYPE 2 DIABETES MELLITUS, WITH FAT LAYER EXPOSED (HCC): ICD-10-CM

## 2017-10-16 DIAGNOSIS — Z01.818 PREOPERATIVE EXAMINATION: ICD-10-CM

## 2017-10-16 DIAGNOSIS — L97.501 ULCER OF FOOT, LIMITED TO BREAKDOWN OF SKIN, UNSPECIFIED LATERALITY (HCC): Primary | ICD-10-CM

## 2017-10-16 DIAGNOSIS — G89.29 CHRONIC BILATERAL LOW BACK PAIN WITHOUT SCIATICA: ICD-10-CM

## 2017-10-16 DIAGNOSIS — M54.50 CHRONIC BILATERAL LOW BACK PAIN WITHOUT SCIATICA: ICD-10-CM

## 2017-10-16 DIAGNOSIS — L97.422 DIABETIC ULCER OF LEFT MIDFOOT ASSOCIATED WITH TYPE 2 DIABETES MELLITUS, WITH FAT LAYER EXPOSED (HCC): ICD-10-CM

## 2017-10-16 DIAGNOSIS — Z72.0 TOBACCO ABUSE: ICD-10-CM

## 2017-10-16 NOTE — PROGRESS NOTES
OUTPATIENT SPECIALTY PODIATRY VISIT      Nursing Progress Note      October 16, 2017  Alvina Duran    Patient description of the problem: Wound check both feet    Observations: Dressings clean and some drainage noted on both  Ambulates: with assistance    Gait: Normal     Assistive Devices: straight cane    Fall History: No    Foot Hygiene: good    Foot Wear Proper: Yes    Impaired Skin Integrity: Yes ulcers on both feet     Pain Assessment:  Pain Present: yes  Pain Score: 4/10 right foot  Pain Quality/Description: Throbbing  Pain Onset: 4 months ago  Pain Goal of patient: 2/10    Education Assessment:    Identify the learner who is being assessed for education:  patient                    Ability to Learn:  Exhibits ability to grasp concepts and respond to questions: Low  Ready to Learn: Yes  calm   Preferred Method of Learning:  written  Barriers to Learning: Verbalizes interest  Special Considerations due to cultural, Sabianism, spiritual beliefs:  No  Language:  English  :  No    Comments:  Pre-op H&P done.   Scheduled for surgery this week     [facility]  11:27 AM 10/16/2017

## 2017-10-16 NOTE — PATIENT INSTRUCTIONS
You will be given post-op instructions when to follow up in [place]    Partial weight bearing    Keep dressings clean dry and intact

## 2017-10-16 NOTE — PATIENT INSTRUCTIONS
Call your pharmacy for medication refills at least 48 hours ahead at [phone] (Monday -Friday, 08:00 AM to 4:00 PM .If you become ill when the clinic is closed, please call [facility]  at [phone] and ask the  to page the AOD between 6:00 AM and 6:00 PM or page the AON between 6:00 PM & 6:00 AM.    The clinic is not able to process [facility] SERVICES requests for appointments or messages including refill requests. Please call the [address] [facility] [address] at [phone] for appointments and messages. [facility]    Return in 2 months for medical clinic      STOP THE METFORMIN NOW AS PRE-OP  ORDER. .    THE EVENING BEFORE SURGERY TAKE 1/2 OF YOUR LANTUS DOSE  (17 UNITS)    DO NOT EAT OR DRINK ANYTHING AFTER MIDNIGHT EXCEPT TAKE A SUGAR CANDY IF GLUCOSE LOW IN AM    AND MORNING OF SURGERY YOU MAY TAKE AMLODIPINE, BUPROPION, AND NEURONTIN  WITH SIP OF WATER        Continue other  medications as ordered; no changes. Use over the counter lidocaine patches as needed for back pain    Apply heat to back as needed for back pain. H&P done today for foot surgery. Instructions reviewed with patient and copy given to patient upon discharge. 10:37 AM10/16/2017          Stopping Smoking: Care Instructions  Your Care Instructions  Cigarette smokers crave the nicotine in cigarettes. Giving it up is much harder than simply changing a habit. Your body has to stop craving the nicotine. It is hard to quit, but you can do it. There are many tools that people use to quit smoking. You may find that combining tools works best for you. There are several steps to quitting. First you get ready to quit. Then you get support to help you. After that, you learn new skills and behaviors to become a nonsmoker. For many people, a necessary step is getting and using medicine. Your doctor will help you set up the plan that best meets your needs. You may want to attend a smoking cessation program to help you quit smoking. When you choose a program, look for one that has proven success. Ask your doctor for ideas. You will greatly increase your chances of success if you take medicine as well as get counseling or join a cessation program.  Some of the changes you feel when you first quit tobacco are uncomfortable. Your body will miss the nicotine at first, and you may feel short-tempered and grumpy. You may have trouble sleeping or concentrating. Medicine can help you deal with these symptoms. You may struggle with changing your smoking habits and rituals. The last step is the tricky one: Be prepared for the smoking urge to continue for a time. This is a lot to deal with, but keep at it. You will feel better. Follow-up care is a key part of your treatment and safety. Be sure to make and go to all appointments, and call your doctor if you are having problems. Its also a good idea to know your test results and keep a list of the medicines you take. How can you care for yourself at home? · Ask your family, friends, and coworkers for support. You have a better chance of quitting if you have help and support. · Join a support group, such as Nicotine Anonymous, for people who are trying to quit smoking. · Consider signing up for a smoking cessation program, such as the American Lung Association's Freedom from Smoking program.  · Set a quit date. Pick your date carefully so that it is not right in the middle of a big deadline or stressful time. Once you quit, do not even take a puff. Get rid of all ashtrays and lighters after your last cigarette. Clean your house and your clothes so that they do not smell of smoke. · Learn how to be a nonsmoker. Think about ways you can avoid those things that make you reach for a cigarette. ¨ Avoid situations that put you at greatest risk for smoking. For some people, it is hard to have a drink with friends without smoking. For others, they might skip a coffee break with coworkers who smoke. ¨ Change your daily routine. Take a different route to work or eat a meal in a different place. · Cut down on stress. Calm yourself or release tension by doing an activity you enjoy, such as reading a book, taking a hot bath, or gardening. · Talk to your doctor or pharmacist about nicotine replacement therapy, which replaces the nicotine in your body. You still get nicotine but you do not use tobacco. Nicotine replacement products help you slowly reduce the amount of nicotine you need. These products come in several forms, many of them available over-the-counter:  ¨ Nicotine patches  ¨ Nicotine gum and lozenges  ¨ Nicotine inhaler  · Ask your doctor about bupropion (Wellbutrin) or varenicline (Chantix), which are prescription medicines. They do not contain nicotine. They help you by reducing withdrawal symptoms, such as stress and anxiety. · Some people find hypnosis, acupuncture, and massage helpful for ending the smoking habit. · Eat a healthy diet and get regular exercise. Having healthy habits will help your body move past its craving for nicotine. · Be prepared to keep trying. Most people are not successful the first few times they try to quit. Do not get mad at yourself if you smoke again. Make a list of things you learned and think about when you want to try again, such as next week, next month, or next year. Where can you learn more? Go to https://[email or web address]. org and sign in to your [employer] account. Enter F010 in the [facility] box to learn more about \"Stopping Smoking: Care Instructions. \"     If you do not have an account, please click on the \"Sign Up Now\" link. Current as of: March 20, 2017  Content Version: 11.3  © 1575-6969 [employer]. Care instructions adapted under license by [facility] ([facility]).  If you have questions about a medical condition or this instruction, always ask your

## 2017-10-17 NOTE — PROGRESS NOTES
.  Department of Podiatry  Resident Progress Note    Lindyfilemon Evaristo  Allergies: Sulfa antibiotics    SUBJECTIVE  The patient is a 45 y.o. female who presents to clinic today for follow up of a bilateral foot wound. Patient is wearing her CAM boot and post-op shoe. Patient just got medical clearance for her EGR this upcoming week. Patient denies of any new complaints. Denies f/c/n/v/cp. Past Medical History:         Diagnosis Date    Amenorrhea     Anomalies of nails     Asthma 5/14/04    Athlete's foot 8/2010    Bacterial vaginosis 04/2008    Carpal tunnel syndrome 5/2007    Diabetes mellitus type II 08/2007    Diabetic neuropathy ([facility] 8/10    DVT (deep venous thrombosis) ([facility] 3/2004    Dyslipidemia 5/2009    Dyspareunia 05/2009    ETOH abuse 3/04/2007    Feet clawing     HTN (hypertension)     Neuropathy ([facility] 05/2009    polyneuropathy    Pain, back 04/2008    Pain, eye, right 5/14/04    Pancreatitis 5/14/04    Pseudocyst, pancreas 5/14/04    Scalp lesion 08/2007    Tinea pedis     Tobacco abuse 03/2008    Vaginal bleeding, abnormal 6/2007       REVIEW OF SYSTEMS:  CONSTITUTIONAL:  negative  RESPIRATORY:  negative  CARDIOVASCULAR:  negative  GASTROINTESTINAL:  negative  MUSCULOSKELETAL:  negative  NEUROLOGICAL:  negative    OBJECTIVE  VASCULAR: DP and PT pulses are palpable 1/4 b/l. CFT is brisk to the digits of the foot b/l. Skin temperature is warm to cool from proximal to distal with no focal calor noted. Mild edema noted to the lower extremities b/l, L>R. No varicosities noted. NEUROLOGIC: Gross and epicritic sensation is intact b/l.      DERMATOLOGIC:     LEFT:   Full thickness ulceration  At plantar aspect of 1st metatarsal head measuring:   (6/26): 2.0 cm x 2.0 cm x 0.2 cm    (7/14): 2.0 cm x 1.9 cm x 0.2 cm  (7/21): 2.0 cm x 2.0 cm x 0.2 cm  (7/28): 1.8 cm x 1.9 cm x 0.2 cm  (8/04): 2 cm x 2 cm x 0.2 cm  (8/11): 1.8 cm x 1.6 cm x 0.3 cm   (8/18): 1.8 cm x 2 cc x 0.2 cm  (8/25): finishing up her last doses of abx.  - Patient  Scheduled for EGR on L side sometime within next week. - Discussed and educated patient on the risk factors and process of surgery this upcoming week. - RTC post operatively.         Dayton Mcburney, PGY-1  Pager #: [phone]

## 2017-10-18 NOTE — PROGRESS NOTES
°F (36.7 °C) (Oral)   Resp 16   Ht 5' 2\" (1.575 m)   Wt 205 lb (93 kg)   SpO2 96%   BMI 37.49 kg/m²      Body mass index is 37.49 kg/m². Wt Readings from Last 3 Encounters:   10/16/17 205 lb (93 kg)   10/09/17 198 lb (89.8 kg)   08/28/17 199 lb (90.3 kg)         Physical Exam      Gen - no acute distress; sitting in chair  Neuro - A&Ox3; no focal deficits on gross exam; moves all extremities voluntarily; foot exam not performed as both feet dressed and in boots  Pulm - no increased work of breathing; CTAB  CV - RRR  Abd - soft, nt  MSK - appropriate tone and bulk; feet in fracture boot/offloading shoe        LABS:     Chemistry:  HbA1c 9/11/17 - 10. 3              Lab Results   Component Value Date     TSH 1.96 08/15/2014         Hematology:  No results for input(s): WBC, HGB, HCT, PLT, MCV, MCH, MCHC, RDW, EOSABS in the last 72 hours.     Invalid input(s): NEUTP, LYMPHP, MONOSP, EOSP, BASOP, NEUTABS, LYMPHABS, MONOABS, BASOABS     No results found for: IRON, TIBC, FERRITIN, FOLATE, [id number], PTH     Lipid:        Lab Results   Component Value Date     CHOL 134 09/11/2017     HDL 50 11/10/2016     LDLCALC 61 11/10/2016     TRIG 97 11/10/2016         U/A:        Lab Results   Component Value Date     LABMICR Not Indicated 05/10/2015                  Assessment/Plan:      Monalisa Loera is a 45 y.o. female h/o DM2 on insulin with neuropathy being seen for labile blood sugars.     DM2 on insulin with peripheral neuropathy  - lantus 35 units BID  - metformin 500mg BID  - patient advised to eat regularly and to avoid high calorie, nutrient poor foods     Pre-op evaluation - patient with no chest pain or shortness of breath. EKG obtained, showed NSR, no changes suggestive of acute ischemia.  Patient is appropriate risk for elective, low-risk procedure (tendon lengthening by podiatry    Chronic back pain - patient advised to try topical, over-the-counter topical agents such as salonpas     Discussed with  Elizabeth [facility]  Medicine Resident  Pager: [phone]

## 2017-10-18 NOTE — PROGRESS NOTES
Department Of Internal Medicine  General Medicine/Primary Care  Established Patient Visit    Patient:  Tushar Wolf                                               : 1965  Age: 45 y.o. MRN: [id number]  Date : 10/18/2017    History Obtained From:  patient    REASON FOR VISIT:  DM2 follow-up/ pre-op evaluation    HISTORY OF PRESENT ILLNESS:   Tushar Wolf is a 45 y.o. female h/o DM2 on insulin with neuropathy who presents for DM2 follow-up. She had her insulin dose reduced due to labile blood sugars. She says they are better controlled in the last week, running 90s-200s. She is trying to eat more regularly and avoiding high calorie foods. She is still smoking and would like to quit but has not decided when she would like to do so. She has chronic lower back pain, and, when combined with her podiatric issues, walking for prolonged periods is difficult. She denies chest pain, shortness of breath with exertion or rest, nasuea, vomiting, diarrhea, recent fevers or chills, focal weakness or abnormal sensation, loss of consciousness, or falls. She denies recent hospitalizations.     Past Medical History:        Diagnosis Date    Amenorrhea     Anomalies of nails     Asthma 04    Athlete's foot 2010    Bacterial vaginosis 2008    Carpal tunnel syndrome 2007    Diabetes mellitus type II 2007    Diabetic neuropathy ([address] 8/10    DVT (deep venous thrombosis) ([address] 3/2004    Dyslipidemia 2009    Dyspareunia 2009    ETOH abuse 3/04/2007    Feet clawing     HTN (hypertension)     Neuropathy ([address] 2009    polyneuropathy    Pain, back 2008    Pain, eye, right 04    Pancreatitis 04    Pseudocyst, pancreas 04    Scalp lesion 2007    Tinea pedis     Tobacco abuse 2008    Vaginal bleeding, abnormal 2007       Past Surgical History:        Procedure Laterality Date     SECTION  unknown    OTHER SURGICAL HISTORY Left 2016    I & D left foot gabapentin (NEURONTIN) 600 MG tablet Take 1 tablet by mouth 3 times daily 8/4/17  Yes Cleve Lugo MD   atorvastatin (LIPITOR) 20 MG tablet Take 1 tablet by mouth daily 5/30/17  Yes Paige sAencio MD   Accu-Chek Softclix Lancets MISC 1 strip by Does not apply route 2 times daily Check before breakfast and before going to bed 4/20/17  Yes Gaurav Cody MD   glucose blood VI test strips (ACCU-CHEK BARBIE) strip 1 each by In Vitro route daily As needed. 4/20/17  Yes Gaurav Cody MD   Blood Glucose Monitoring Suppl (ACCU-CHEK BARBIE PLUS) W/DEVICE KIT 1 kit by Does not apply route 2 times daily 4/20/17  Yes Gaurav Cody MD   buPROPion [facility]) 150 MG extended release tablet Take 1 tablet by mouth 2 times daily 3/27/17  Yes Delores Rodríguez MD   glucose blood VI test strips (FREESTYLE TEST STRIPS) strip 1 each by In Vitro route daily As needed. 2/2/17  Yes Gaurav Cody MD   Insulin Pen Needle 31G X 5 MM MISC 1 each by Does not apply route daily 2/2/17  Yes Gaurav Cody MD   Heat aps [facility] BACK/HIP) MISC 1 patch by Does not apply route as needed (back pain) 9/1/16  Yes Gaurav Cody MD   Insulin Syringe-Needle U-100 30G X 5/16\" 0.5 ML MISC 1 each by Does not apply route daily 9/1/16  Yes Gaurav Cody MD   METHADONE HCL PO Take 146 mg by mouth daily   Yes Historical Provider, MD   nicotine polacrilex (NICORETTE) 2 MG gum Take 1 each by mouth as needed for Smoking cessation 8/28/17   Gaurav Cody MD   nicotine (NICODERM CQ) 14 MG/24HR Place 1 patch onto the skin every 24 hours 8/21/17   Gaurav Cody MD       ROS     As per HPI; otherwise negative on 10 point review    Physical Exam:      Vitals: BP (!) 157/85 (Site: Right Arm, Position: Sitting, Cuff Size: Medium Adult)   Pulse 97   Temp 98 °F (36.7 °C) (Oral)   Resp 16   Ht 5' 2\" (1.575 m)   Wt 205 lb (93 kg)   SpO2 96%   BMI 37.49 kg/m²     Body mass index is 37.49 kg/m².   Wt Readings from Last 3

## 2017-10-19 RX ORDER — SODIUM CHLORIDE, SODIUM LACTATE, POTASSIUM CHLORIDE, CALCIUM CHLORIDE 600; 310; 30; 20 MG/100ML; MG/100ML; MG/100ML; MG/100ML
INJECTION, SOLUTION INTRAVENOUS CONTINUOUS
Status: DISCONTINUED | OUTPATIENT
Start: 2017-10-19 | End: 2017-10-21 | Stop reason: HOSPADM

## 2017-10-20 ENCOUNTER — HOSPITAL ENCOUNTER (OUTPATIENT)
Dept: SURGERY | Age: 52
Discharge: HOME | End: 2017-10-20
Admitting: PODIATRIST

## 2017-10-20 VITALS
WEIGHT: 200 LBS | HEIGHT: 62 IN | DIASTOLIC BLOOD PRESSURE: 80 MMHG | BODY MASS INDEX: 36.8 KG/M2 | OXYGEN SATURATION: 95 % | RESPIRATION RATE: 16 BRPM | HEART RATE: 80 BPM | TEMPERATURE: 98 F | SYSTOLIC BLOOD PRESSURE: 136 MMHG

## 2017-10-20 LAB
GLUCOSE BLD-MCNC: 178 MG/DL (ref 70–99)
GLUCOSE BLD-MCNC: 226 MG/DL (ref 70–99)
PERFORMED ON: ABNORMAL
PERFORMED ON: ABNORMAL

## 2017-10-20 RX ORDER — HYDRALAZINE HYDROCHLORIDE 20 MG/ML
5 INJECTION INTRAMUSCULAR; INTRAVENOUS EVERY 10 MIN PRN
Status: DISCONTINUED | OUTPATIENT
Start: 2017-10-20 | End: 2017-10-21 | Stop reason: HOSPADM

## 2017-10-20 RX ORDER — FENTANYL CITRATE 50 UG/ML
25 INJECTION, SOLUTION INTRAMUSCULAR; INTRAVENOUS EVERY 5 MIN PRN
Status: COMPLETED | OUTPATIENT
Start: 2017-10-20 | End: 2017-10-20

## 2017-10-20 RX ORDER — FENTANYL CITRATE 50 UG/ML
50 INJECTION, SOLUTION INTRAMUSCULAR; INTRAVENOUS EVERY 5 MIN PRN
Status: DISCONTINUED | OUTPATIENT
Start: 2017-10-20 | End: 2017-10-21 | Stop reason: HOSPADM

## 2017-10-20 RX ORDER — PROMETHAZINE HYDROCHLORIDE 25 MG/ML
6.25 INJECTION, SOLUTION INTRAMUSCULAR; INTRAVENOUS
Status: ACTIVE | OUTPATIENT
Start: 2017-10-20 | End: 2017-10-20

## 2017-10-20 RX ORDER — LABETALOL HYDROCHLORIDE 5 MG/ML
5 INJECTION, SOLUTION INTRAVENOUS EVERY 10 MIN PRN
Status: DISCONTINUED | OUTPATIENT
Start: 2017-10-20 | End: 2017-10-21 | Stop reason: HOSPADM

## 2017-10-20 RX ORDER — ONDANSETRON 2 MG/ML
4 INJECTION INTRAMUSCULAR; INTRAVENOUS
Status: ACTIVE | OUTPATIENT
Start: 2017-10-20 | End: 2017-10-20

## 2017-10-20 RX ADMIN — SODIUM CHLORIDE, SODIUM LACTATE, POTASSIUM CHLORIDE, CALCIUM CHLORIDE: 600; 310; 30; 20 INJECTION, SOLUTION INTRAVENOUS at 11:25

## 2017-10-20 RX ADMIN — FENTANYL CITRATE 25 MCG: 50 INJECTION, SOLUTION INTRAMUSCULAR; INTRAVENOUS at 15:00

## 2017-10-20 RX ADMIN — FENTANYL CITRATE 25 MCG: 50 INJECTION, SOLUTION INTRAMUSCULAR; INTRAVENOUS at 14:38

## 2017-10-20 RX ADMIN — FENTANYL CITRATE 25 MCG: 50 INJECTION, SOLUTION INTRAMUSCULAR; INTRAVENOUS at 14:44

## 2017-10-20 RX ADMIN — FENTANYL CITRATE 25 MCG: 50 INJECTION, SOLUTION INTRAMUSCULAR; INTRAVENOUS at 14:50

## 2017-10-20 ASSESSMENT — PAIN DESCRIPTION - DESCRIPTORS: DESCRIPTORS: ACHING

## 2017-10-20 ASSESSMENT — PAIN SCALES - GENERAL
PAINLEVEL_OUTOF10: 4
PAINLEVEL_OUTOF10: 4
PAINLEVEL_OUTOF10: 5

## 2017-10-20 ASSESSMENT — PAIN - FUNCTIONAL ASSESSMENT
PAIN_FUNCTIONAL_ASSESSMENT: 0-10
PAIN_FUNCTIONAL_ASSESSMENT: 0-10

## 2017-10-20 NOTE — H&P
attempt to quit: 10/27/2015    Smokeless tobacco: Never Used    Alcohol use Not on file      Comment: Pt is an alcoholic    Drug use: Unknown      Comment: [employer] Maintance    Sexual activity: Not on file     Other Topics Concern    Not on file     Social History Narrative    No narrative on file         Medications Prior to Admission:      Prior to Admission medications    Medication Sig Start Date End Date Taking?  Authorizing Provider   amLODIPine (NORVASC) 10 MG tablet Take 1 tablet by mouth daily 10/9/17  Yes Adarsh Reaves MD   omeprazole (PRILOSEC) 20 MG delayed release capsule Take 1 capsule by mouth Daily 10/3/17  Yes iCnda Martin MD   amitriptyline (ELAVIL) 100 MG tablet Take 1 tablet by mouth nightly 9/25/17  Yes Lina Alexandra MD   terbinafine (LAMISIL) 250 MG tablet Take 1 tablet by mouth daily 9/22/17 11/3/17 Yes Narcisa Calixto DPM   gabapentin (NEURONTIN) 600 MG tablet Take 1 tablet by mouth 3 times daily 8/4/17  Yes Felicity Aguilar MD   atorvastatin (LIPITOR) 20 MG tablet Take 1 tablet by mouth daily 5/30/17  Yes Jai Tanner MD   buPROPion [facility] SR) 150 MG extended release tablet Take 1 tablet by mouth 2 times daily 3/27/17  Yes Eleazar Abdalla MD   METHADONE HCL PO Take 146 mg by mouth daily   Yes Historical Provider, MD   insulin glargine (LANTUS SOLOSTAR) 100 UNIT/ML injection pen Inject 35 Units into the skin 2 times daily 10/9/17   Adarsh Reaves MD   metFORMIN (GLUCOPHAGE) 500 MG tablet Take 2 tablets by mouth 2 times daily (with meals) 9/25/17   Beka Quinones MD   nicotine polacrilex (NICORETTE) 2 MG gum Take 1 each by mouth as needed for Smoking cessation 8/28/17   Cammy Naik MD   nicotine (NICODERM CQ) 14 MG/24HR Place 1 patch onto the skin every 24 hours 8/21/17   Cammy Naik MD   Blood Glucose Monitoring Suppl (TRUE METRIX AIR GLUCOSE METER) LASHON 1 Units by Does not apply route 2 times daily 8/16/17   Christiano Lennon MD   glucose blood VI test strips (TRUE

## 2017-10-20 NOTE — OP NOTE
calcaneus where the gastroc aponeurosis medially palpated and incision was created at this level in the medial leg.  The incision was deepened down through the layers of subcutaneous tissue using sharp and blunt dissection.  All venous tributaries were isolated and electrocoagulated as encountered.  Dissection continued down to the fascia overlying the muscle belly of the gastrosoleal complex.  The foot was placed in dorsiflexed position and the gastroc aponeurosis was observed.  A punctiform incision was created with a dissecting scissors through the fascia.  The fascial elevator was then placed beneath the fascia and inferior to the gastroc aponeurosis from medial to lateral across the entire aponeurosis.  The fascial elevator was then removed and a sliding obturator and cannula were placed along the same path.  The endoscope was then placed through the cannula and the gastroc aponeurosis was visualized, as the foot was dorsiflexed and plantarflexed at the ankle joint.  The aponeurosis was noted to be healthy in appearance.    At this time, the endoscopic blade was placed into the cannula and the gastroc aponeurosis was transected.  The aponeurosis was noted to be lengthened and the underlying muscle belly noted to be intact.  Upon completion, the ankle joint range of motion had increased to approximately 10 degrees of rearfoot dorsiflexion at the ankle joint.  Upon completion, the blade was removed and the endoscope was then passed through the cannula an additional time to assess the soft tissue correction, which was noted to be optimal.  The wound was then flushed with copious amounts of sterile saline.  Attention was directed towards closure, where the deep tissue layer was closed with 3-0 Vicryl in simple interrupted fashion, the 4-0 Vicryl was utilized to close the subcutaneous tissue layer, and 3-0 and 4-0 nylon was utilized to re-coapt the skin.      Details of Procedure:  Injection of amniotic graft, right  At this time the graft was prepared according to [facility] instruction and injected into the surgical site. The graft was applied in efforts to reduce inflammation and prevent adhesions. Details of Procedure: Application of posterior splint, right  At this time, a local anesthetic was injected in a regional block fashion about the patients surgical sites for the patients postoperative comfort and soft dry sterile dressing was applied. Next, copious amounts of cast padding was applied to the patient's right lower extremity from the metatarsal heads to approximately 3 finger breaths distal to the head and neck of the fibula. Next, using moistened padded splint material, a posterior splint was applied from the plantar foot posteriorly up the leg to approximately the midcalf area. ACE compression was then applied from distal to proximal to secure the posterior splint in place and provide compression to prevent post-operative edema. END OF PROCEDURE: At this time, a local anesthetic was injected in a regional block fashion about the patients surgical sites for the patients postoperative comfort and soft dry sterile dressing was applied. The patient tolerated the procedure and anesthesia well. The patient was transported from the OR to the PACU with vital signs stable and vascular status intact to all digits of the right foot. Following a short period of post-operative monitoring the patient is to be discharged home.           Dictated for Dr. Peggy Thompson, PGY-3  Pager: [phone]

## 2017-10-20 NOTE — PROGRESS NOTES
The following education and goals will be achieved upon completion of the patient's care in [facility]:    IdIdentify the learner who is being assessed for education: patient  Ability to Learn:  Exhibits ability to grasp concepts and respond to questions: Medium  Ready to Learn: Yes  calm  Preferred Method of Learning:  verbal  Barriers to Learning: Verbalizes interest  Special Considerations due to cultural, Hindu, spiritual beliefs:  no  Language: English  : delores    [address] [facility] Goals  [x]Appropriate evaluation / integration of data as delineated by [employer] Standards of [address] [place] [address] Pain scale and pain management  [x]Patient will verbalize understanding of pain scale and pain management. [x]Pre-operative determination of patients anticipated Post-Operative pain goal: 4 of 10 on 10 point scale post op goal  [x]Patient will verbalize plan for pain management  []Other     Compliance with Pre-op Instructions - Patient reports compliance with:  []Taking prescribed home meds before arrival  []Surgical prep instructions specific to the patient's surgery    Fall Risk - PreOperatively   []No preoperative risk identified  [x]Preop risk identified:      [] Sensory deficit     [] Motor deficit     [] Balance problem     [] Home medication     []Uses assistive device to ambulate      []History of a Fall within the last 30 days  [x]Due to Perioperative medication administration    Goal(s) for fall prevention:  [x] Prevent fall or injury by use of encouraging call light for assistance, side rails, and assisting with activity. [x]Patient / Significant other verbalize understanding of need to call for assistance prior to getting out of bed.     Infection Precautions                                                                                                   [x] Patient understands implementation of [facility] Infection precautions  [x] Handwashing, skin prep prior to IV insertion, hair clipped at surgical site if needed  [] Pre op antibiotic, if ordered    Patient Safety  [x] Patient identification  [x] Site verification (See Universal Protocol Checklist)  [x] General Safety precautions  [x] Side rails up, bed/stretcher in low position, wheels locked  [x] Call light within reach  [x] Patient instructed to call for assistance prior to getting out of bed    Instructions - Discharge planning for Outpatients  [x] Patient / significant other voices understanding of home care and follow up procedures.   Anticipated Special Needs upon discharge:  [] Cooling device  [] Crutches  [] Walker  [] Wound Support device   []Drain  []Other   [x] See [facility] Ambulatory Procedure Discharge Instructions    Instructions - Discharge planning for Admitted Patients  [] Patient / Significant other understands plan for admission after surgery  [x] Patient / Significant other understands plan for anticipated discharge disposition                   10/20/2017 10:48 AM

## 2017-10-20 NOTE — PROGRESS NOTES
Patient admitted to PACU # 4 from OR at 1330 post RIGHT GASTROC LENGTHENING ENDOSCOPIC, INJECTION OF AMNI GRAFT per Dr. Anali Chirinos. Attached to PACU monitoring system and report received from anesthesia provider. Patient was reported to be hemodynamically stable during procedure. Patient drowsy on admission with no signs of pain.

## 2017-10-20 NOTE — PROGRESS NOTES
PACU Transfer to \[facility]\""    Vitals:    10/20/17 1530   BP: 138/69   Pulse: 79   Resp: 26   Temp: 97.4 °F (36.3 °C)   SpO2: 96%         Intake/Output Summary (Last 24 hours) at 10/20/17 1540  Last data filed at 10/20/17 1539   Gross per 24 hour   Intake              901 ml   Output                0 ml   Net              901 ml       Pain assessment:  none  Pain Level: 4    Patient transferred to care of \[facility]\"" RN.    10/20/2017 3:40 PM

## 2017-10-20 NOTE — PROGRESS NOTES
[address] 89 may be drowsy or lightheaded after receiving sedation or anesthesia. Do not operate any vehicles (automobiles, bicycles, motorcycles) or power tools or machinery for 24 hours. Do not sign any legal documents or make any legal decisions for 24 hours. Do not drink alcohol for 24 hours or while taking narcotic pain medication. A responsible person should be with you for the next 24 hours. Please follow the instructions checked below:      DIET INSTRUCTIONS:  [x]Start with light diet and progress to your normal diet as you feel like eating. If you experience nausea or repeated episodes of vomiting which persist beyond 12-24 hours, notify your doctor. MEDICATION INSTRUCTIONS:  []Prescription(S) x     sent with you. Use as directed. When taking pain medications, you may experience the side effect of dizziness or drowsiness. Do not drink alcohol or drive when taking these medications. []Prescription(S) x          Called to Pharmacy Name and location:    [x]Give the list of your medications to your primary care physician on your next visit. Keep your med list updated and carry it with in case of emergencies. [] Narcotic pain medications can cause the side effect of significant constipation. You may want to add a stool softener to your postoperative medication schedule or speak to your surgeon on how best to manage this side effect. If you have a CPAP machine, it is very important that you use it daily during all periods of sleep and daytime rest during your recovery at home. Surgery and Anesthesia place a significant amount of stress on your body. Using your CPAP will help keep you safe and lessen the negative effects of that stress. FOLLOW-UP CARE:  [x]Call the office  for follow-up appointment and problems    Watch for these possible complications or symptoms.   Call physician if they or any other problems occur:  - Fever over 101°    - Redness, swelling, hardness or warmth at the operative site  - Foul smelling or cloudy drainage at the operative site   - Unrelieved pain  - Unrelieved nausea  - Blood soaked dressing. (Some oozing may be normal)  - Inability to urinate      - Numb, pale, blue, cold or tingling extremity      Physician:  Rafael Cardona and residents  The above instructions were reviewed with patient/significant other. The following additional patient specific information was reviewed with the patient/significant other:  [x]Procedure/physician specific instructions  []Medication information sheet(S)  []SintiaS egress test  []Pain Ball management  []FAQ Catheter associated blood stream infections  []FAQ Surgical Site Infections  []Other-    I have read and understand the instructions given to me: ____________________________________________   (Patient/S.O. Signature)            Date/time 10/20/2017 4:25 PM         PACU:  [phone]   M-F 700 AM - 7 PM      SAME DAY SERVICES:  [phone] M-F 7AM-6PM        If you smoke STOP. We care about your health! Ambulatory Surgery/Procedure Discharge Note    Vitals:    10/20/17 1546   BP: 135/79   Pulse: 79   Resp: 16   Temp: 97.2 °F (36.2 °C)   SpO2: 93%       In: 201 [I.V.:201]  Out: -     Pain assessment:  level of pain (1-10, 10 severe),   Pain Level: 4    Patient discharged to home/self care.  Patient discharged via wheel chair by transporter to waiting family/S.O.       10/20/2017 4:25 PM   Ambulatory Surgery/Procedure Discharge Note    Vitals:    10/20/17 1546   BP: 135/79   Pulse: 79   Resp: 16   Temp: 97.2 °F (36.2 °C)   SpO2: 93%   pt alert  And oriented  bilat foot drsgs clean dry   Vs stable  Verbal and  Written discharge instructions given to pt and family  Call Dr Rafael Cardona  For f/u  Crutches and ice bags given with instructions  No Rxs --pt on methadone  dcd to car per wheelchair      In: 201 [I.V.:201]  Out: -     Pain assessment:  level of pain (1-10, 10 severe),   Pain

## 2017-10-20 NOTE — ANESTHESIA PRE-OP
LANCETS MISC    1 each by Does not apply route 2 times daily PHARMACY MAY SUBSTITUTE TO TRUE METRIX LANCETS    METFORMIN (GLUCOPHAGE) 500 MG TABLET    Take 2 tablets by mouth 2 times daily (with meals)    METHADONE HCL PO    Take 146 mg by mouth daily    NICOTINE (NICODERM CQ) 14 MG/24HR    Place 1 patch onto the skin every 24 hours    NICOTINE POLACRILEX (NICORETTE) 2 MG GUM    Take 1 each by mouth as needed for Smoking cessation    OMEPRAZOLE (PRILOSEC) 20 MG DELAYED RELEASE CAPSULE    Take 1 capsule by mouth Daily    TERBINAFINE (LAMISIL) 250 MG TABLET    Take 1 tablet by mouth daily   Modified Medications    No medications on file   Discontinued Medications    No medications on file      ASA STATUS: 3  NPO since: > 8 hrs   Patient identified/chart reviewed: yes  CV: + HTN no HLD  no CAD   PULMONARY: no  Asthma    no COPD   no RASHMI +smoker   ENDOCRINE: + DM     no Thyroid Disease   GI: + GERD   : no known renal disease   NEURO/PSYCH: no CVA   no Seizure Disorder +peripheral neuropathy    MUSCULOSKELETAL: b/l foot pain   HEME/ONC: h/o DVT  no PE   no Anemia      OTHER:   EKG:   Echocardiogram:   COMMENTS:        PSH:  has a past surgical history that includes  section (unknown); e-malignant / benign skin lesion excision (7003); other surgical history (Left, 2016); and Toe amputation (Left, 2017).     Patient Active Problem List   Diagnosis    Feet clawing    Diabetic neuropathy (HCC)    Tinea pedis    Dyslipidemia    HTN (hypertension)    Amenorrhea    Dyspareunia    Neuropathy (HCC)    Bacterial vaginosis    Pain in back    Anomalies of nails    Tobacco abuse    Vaginal bleeding, abnormal    Carpal tunnel syndrome    Scalp lesion    ETOH abuse    Diabetes mellitus, type II (HCC)    DVT (deep venous thrombosis) (HCC)    Pseudocyst, pancreas    Pancreatitis    Asthma    Pain, eye, right    Pneumonia    Nicotine addiction    Acute pancreatitis    Hypoxia    Onychomycosis    Depressive disorder, not elsewhere classified    Anxiety state    Tinea pedis    Open wound of foot    Burn    Obesity (BMI 30-39. 9)    Diabetic ulcer of left foot associated with type 2 diabetes mellitus ([address]    Pain medication agreement signed     PMH:  Past Medical History:   Diagnosis Date    Amenorrhea     Anomalies of nails     Asthma 5/14/04    Athlete's foot 8/2010    Bacterial vaginosis 04/2008    Carpal tunnel syndrome 5/2007    Diabetes mellitus type II 08/2007    Diabetic neuropathy ([address] 8/10    DVT (deep venous thrombosis) ([address] 3/2004    Dyslipidemia 5/2009    Dyspareunia 05/2009    ETOH abuse 3/04/2007    Feet clawing     HTN (hypertension)     Neuropathy ([address] 05/2009    polyneuropathy    Pain, back 04/2008    Pain, eye, right 5/14/04    Pancreatitis 5/14/04    Pseudocyst, pancreas 5/14/04    Scalp lesion 08/2007    Tinea pedis     Tobacco abuse 03/2008    Vaginal bleeding, abnormal 6/2007      PERSONAL/FAMILY ANESTHESIA PROBLEMS: no     AIRWAY ASSESSMENT:  MALLAMPATI: II DENTITION: dentures ROM: full     ANESTHETIC PLAN: GA with standard ASA monitors    If abraham-operative block planned, describe:    CONSENT: Risks/benefits/options/questions discussed.  Patient agrees:  yes

## 2017-10-20 NOTE — BRIEF OP NOTE
Brief Postoperative Note    Moose Angel  YOB: 1965  MRN: [id number]  DOS: 10/20/2017    Surgeon: Dr. Angelica Tan, DPM    Assistants: Mio Weaver, PGY-3; Caroline Child MS4    Pre-operative Diagnosis:   1. Gastroc equinus, right  2. Reeda Deem I diabetic foot ulcer, right foot  3. Diabetes mellitus with polyneuropathy    Post-operative Diagnosis: Same    Procedure:   1. Endoscopic gastroc recession, right  2. Injectyion of amniotic graft, right  3. Application of posterior splint, right       Anesthesia: MAC and Local    Hemostasis: anatomic dissection     Estimated Blood Loss: less than 50     Materials: 3-0, 4-0 vicryl, 3-0, 4-0 nylon    Injectables: Pre: 15 cc 0.5% marcaine plain ; Post: none    Complications: None    Specimens: Was Not Obtained    Findings: as dictated     The patient tolerated the procedure and anesthesia well and was transported from the operating room to the PACU with vital signs stable and vascular status intact to all aspects of the patient's right lower extremity and digital capillary refill time immediate to the digits of the right  foot. Following a period of post-operative monitoring, the patient will be discharged home with written and oral wound care and follow-up instructions per Dr. Ramonita Woo. The patient is to follow-up with Dr. Ramonita Woo in his private office within 3-5 days. The patient is to keep dressing clean, dry and intact at all times. The patient is to call with if any complications occur.     Dictated for Dr. Abdiaziz Colon, PGY-3  Pager: [phone]

## 2017-10-30 ENCOUNTER — OFFICE VISIT (OUTPATIENT)
Dept: INTERNAL MEDICINE | Age: 52
End: 2017-10-30
Attending: PODIATRIST

## 2017-10-30 DIAGNOSIS — S91.309D OPEN WOUND OF FOOT, UNSPECIFIED LATERALITY, SUBSEQUENT ENCOUNTER: Primary | ICD-10-CM

## 2017-10-30 DIAGNOSIS — B35.3 TINEA PEDIS OF BOTH FEET: ICD-10-CM

## 2017-10-30 DIAGNOSIS — G89.18 POST-OP PAIN: ICD-10-CM

## 2017-11-01 ENCOUNTER — HOSPITAL ENCOUNTER (OUTPATIENT)
Dept: OTHER | Age: 52
Discharge: HOME | End: 2017-11-30
Attending: EMERGENCY MEDICINE | Admitting: EMERGENCY MEDICINE

## 2017-11-06 ENCOUNTER — OFFICE VISIT (OUTPATIENT)
Dept: INTERNAL MEDICINE | Age: 52
End: 2017-11-06
Attending: PODIATRIST

## 2017-11-06 VITALS
OXYGEN SATURATION: 95 % | DIASTOLIC BLOOD PRESSURE: 102 MMHG | HEART RATE: 87 BPM | RESPIRATION RATE: 16 BRPM | TEMPERATURE: 98.3 F | SYSTOLIC BLOOD PRESSURE: 159 MMHG

## 2017-11-06 DIAGNOSIS — E11.621 DIABETIC ULCER OF LEFT MIDFOOT ASSOCIATED WITH TYPE 2 DIABETES MELLITUS, WITH FAT LAYER EXPOSED (HCC): ICD-10-CM

## 2017-11-06 DIAGNOSIS — S91.301D OPEN WOUND OF RIGHT FOOT, SUBSEQUENT ENCOUNTER: ICD-10-CM

## 2017-11-06 DIAGNOSIS — L97.422 DIABETIC ULCER OF LEFT MIDFOOT ASSOCIATED WITH TYPE 2 DIABETES MELLITUS, WITH FAT LAYER EXPOSED (HCC): ICD-10-CM

## 2017-11-06 DIAGNOSIS — S81.801S NON-HEALING WOUND OF LOWER EXTREMITY, RIGHT, SEQUELA: Primary | ICD-10-CM

## 2017-11-06 DIAGNOSIS — E11.42 TYPE 2 DIABETES MELLITUS WITH DIABETIC POLYNEUROPATHY, WITH LONG-TERM CURRENT USE OF INSULIN (HCC): ICD-10-CM

## 2017-11-06 DIAGNOSIS — Z79.4 TYPE 2 DIABETES MELLITUS WITH DIABETIC POLYNEUROPATHY, WITH LONG-TERM CURRENT USE OF INSULIN (HCC): ICD-10-CM

## 2017-11-06 LAB
BASOPHILS ABSOLUTE: 0.1 K/UL (ref 0–0.2)
BASOPHILS RELATIVE PERCENT: 0.6 %
EOSINOPHILS ABSOLUTE: 0.2 K/UL (ref 0–0.6)
EOSINOPHILS RELATIVE PERCENT: 2.1 %
GLUCOSE BLD-MCNC: 389 MG/DL (ref 70–99)
HCT VFR BLD CALC: 39.1 % (ref 36–48)
HEMOGLOBIN: 13.2 G/DL (ref 12–16)
LYMPHOCYTES ABSOLUTE: 1.9 K/UL (ref 1–5.1)
LYMPHOCYTES RELATIVE PERCENT: 18.8 %
MCH RBC QN AUTO: 30.8 PG (ref 26–34)
MCHC RBC AUTO-ENTMCNC: 33.8 G/DL (ref 31–36)
MCV RBC AUTO: 91.2 FL (ref 80–100)
MONOCYTES ABSOLUTE: 0.6 K/UL (ref 0–1.3)
MONOCYTES RELATIVE PERCENT: 6.1 %
NEUTROPHILS ABSOLUTE: 7.4 K/UL (ref 1.7–7.7)
NEUTROPHILS RELATIVE PERCENT: 72.4 %
PDW BLD-RTO: 14.2 % (ref 12.4–15.4)
PERFORMED ON: ABNORMAL
PLATELET # BLD: 333 K/UL (ref 135–450)
PMV BLD AUTO: 9.2 FL (ref 5–10.5)
RBC # BLD: 4.28 M/UL (ref 4–5.2)
SEDIMENTATION RATE, ERYTHROCYTE: 67 MM/HR (ref 0–30)
WBC # BLD: 10.2 K/UL (ref 4–11)

## 2017-11-06 RX ORDER — CLINDAMYCIN HYDROCHLORIDE 300 MG/1
300 CAPSULE ORAL 4 TIMES DAILY
Qty: 30 CAPSULE | Refills: 0 | Status: SHIPPED | OUTPATIENT
Start: 2017-11-06 | End: 2017-11-16

## 2017-11-06 NOTE — PATIENT INSTRUCTIONS
Return to clinic 1 week    MRI as scheduled    Keep dressing dry, clean and intact    Antibiotics as scheduled

## 2017-11-06 NOTE — PROGRESS NOTES
.  Department of Podiatry  Resident Progress Note    [place]   Allergies: Sulfa antibiotics    SUBJECTIVE  The patient is a 45 y.o. female who presents to clinic today for follow up of a bilateral foot wound. She is post op R EGR done 10/20/17, Seen in ED 10/23/17 for b/l venous stasis dermatitis and cellulitis treated with oral abx. States is feeling much better. Patient denies of any new complaints. Denies f/c/n/v/cp. Past Medical History:         Diagnosis Date    Amenorrhea     Anomalies of nails     Asthma 5/14/04    Athlete's foot 8/2010    Bacterial vaginosis 04/2008    Carpal tunnel syndrome 5/2007    Diabetes mellitus type II 08/2007    Diabetic neuropathy ([facility] [address] 8/10    DVT (deep venous thrombosis) ([facility] [facility] 3/2004    Dyslipidemia 5/2009    Dyspareunia 05/2009    ETOH abuse 3/04/2007    Feet clawing     HTN (hypertension)     Neuropathy ([facility] [facility] 05/2009    polyneuropathy    Pain, back 04/2008    Pain, eye, right 5/14/04    Pancreatitis 5/14/04    Pseudocyst, pancreas 5/14/04    Scalp lesion 08/2007    Tinea pedis     Tobacco abuse 03/2008    Vaginal bleeding, abnormal 6/2007       REVIEW OF SYSTEMS:  CONSTITUTIONAL:  negative  RESPIRATORY:  negative  CARDIOVASCULAR:  negative  GASTROINTESTINAL:  negative  MUSCULOSKELETAL:  negative  NEUROLOGICAL:  negative    OBJECTIVE  VASCULAR: DP and PT pulses are palpable 1/4 b/l. CFT is brisk to the digits of the foot b/l. Skin temperature is warm to R forefoot, left foot is cool. NEUROLOGIC: Gross and epicritic sensation is intact b/l.      DERMATOLOGIC:     LEFT:   Full thickness ulceration  At plantar aspect of 1st metatarsal head measuring:   (6/26): 2.0 cm x 2.0 cm x 0.2 cm    (7/14): 2.0 cm x 1.9 cm x 0.2 cm  (7/21): 2.0 cm x 2.0 cm x 0.2 cm  (7/28): 1.8 cm x 1.9 cm x 0.2 cm  (8/04): 2 cm x 2 cm x 0.2 cm  (8/11): 1.8 cm x 1.6 cm x 0.3 cm   (8/18): 1.8 cm x 2 cc x 0.2 cm  (8/25): 1.6 cm x 1.3 cm x 0.2 cm   (9/1): 1.1 cm x 1.2 cm x 0.2 cm (9/22) 1.3cm x 1.3 cm   (10/2) : 1.0 cmx 1.0cm   (10/16): 0.6 cm x 0.8 cm  (10/30): 1.0cm x 1.0cm   (11/6): 1.0cmx1.0cmx 0.2cm   100%  granular post debridement. No signs of tracking, probing to bone, drainage, or signs of infection. Mild malodor. RIGHT:   Partial thickness ulcer on the plantar 5th MPJ measuring:   (7/14): 0.9 x 0.7 x 0.1 cm   (7/21): 1 x 0.8 x  0.1 cm  (7/28): 0.7 cm x 0.5 cm x 0.1 cm   (8/11): 0.7 cm x 0.8 cm x 0.2 cm   (8/18): 1 cm x 1 cm x 0.1 cm  (8/25): 1 cm x 0.8 cm x 0.2 cm   (9/1): 1 cm x 1 cm x 0.2  (9/22) 1.2cm x 1.2cm   (10/2): 1.0cm x1.0cm x0.2cm   (10/16): 0.7 cm x 0.7 cm x0.4cm    (11/06): 1.0cmx1.0cmx0.4cm with drainage, pain and local erythema. 100% granular wound with hyperkeratotic rim post debridement. R medial leg incision from gastroc recession, incision site is draining, erythematous, and tender to palpation. MUSCULOSKELETAL: No pain with palpation of the calf b/l. No pain with palpation of the left foot. Equinus with leg extension, b/l      ASSESSMENT  1. Full thickness ulceration, Carpenter grade II left foot  2. Full thickness ulceration, Carpenter grade II right foot  3. S/p Hallux amputation - Left (DOS; 02/24/2017) - HEALED  4. S/P R EGR 10/20/17  5. Diabetes mellitus, uncontrolled. 6. Tinea pedis B/L   7. Equinus B/L       PLAN  - patient seen and examined. Improvement noted to the left foot wound  - cx taken of r foot drainge. - ESR, CBC ordered f/u. - VSS, , pt had not taken her medication.   - Instructed pt to continue with PWB.   - Educated patient on the importance on lower and monitoring blood sugar level. - rx given for clindamycin 300mg 4x daily for 10 days.   - Ordered MRI R foot r/o osteo. - f/u 1wk.        Christiana Garcia DPM   Podiatric Resident, PGY-1  Pager: [phone]  11/6/2017, 10:56 AM

## 2017-11-06 NOTE — PROGRESS NOTES
OUTPATIENT SPECIALTY PODIATRY VISIT      Nursing Progress Note      November 6, 2017  James Kennedy    Patient description of the problem: Wound check bilateral    Observations: both legs red    Ambulates: without assistance    Gait: Abnormal  In cam boot and surgi shoe     Assistive Devices: none    Fall History: No    Foot Hygiene: good    Foot Wear Proper: Yes    Impaired Skin Integrity: Yes resolving wound     Pain Assessment:  Pain Present: yes  Pain Score: 7/10  Pain Quality/Description: Burning and Throbbing  Pain Onset: 4 months ago  Pain Goal of patient: 2/10    Education Assessment:    Identify the learner who is being assessed for education:  family                    Ability to Learn:  Exhibits ability to grasp concepts and respond to questions: Medium  Ready to Learn: Yes  calm   Preferred Method of Learning:  written  Barriers to Learning: [employer] interest  Special Considerations due to cultural, Episcopal, spiritual beliefs:  No  Language:  English  :  No    Comments:  Finished antibiotics.  Both lower legs red and rodríguez to touch    Pavel Herrera  10:19 AM 11/6/2017

## 2017-11-06 NOTE — PROGRESS NOTES
Blood sugar 389. Dr. Consuelo Patel and [facility] aware of results. States she has not taken her medication s today as she was running late for her appointment.

## 2017-11-09 DIAGNOSIS — L98.9 SKIN LESION OF SCALP: ICD-10-CM

## 2017-11-09 RX ORDER — GABAPENTIN 600 MG/1
600 TABLET ORAL 3 TIMES DAILY
Qty: 90 TABLET | Refills: 3
Start: 2017-11-09 | End: 2018-01-31 | Stop reason: SDUPTHER

## 2017-11-12 LAB
ANAEROBIC CULTURE: ABNORMAL
GRAM STAIN RESULT: ABNORMAL
ORGANISM: ABNORMAL
ORGANISM: ABNORMAL
WOUND/ABSCESS: ABNORMAL

## 2017-11-17 ENCOUNTER — OFFICE VISIT (OUTPATIENT)
Dept: INTERNAL MEDICINE | Age: 52
End: 2017-11-17
Attending: PODIATRIST

## 2017-11-17 DIAGNOSIS — E11.621 DIABETIC ULCER OF TOE OF LEFT FOOT ASSOCIATED WITH TYPE 2 DIABETES MELLITUS, WITH FAT LAYER EXPOSED (HCC): ICD-10-CM

## 2017-11-17 DIAGNOSIS — S91.301D OPEN WOUND OF RIGHT FOOT, SUBSEQUENT ENCOUNTER: Primary | ICD-10-CM

## 2017-11-17 DIAGNOSIS — T81.31XA DEHISCENCE OF OPERATIVE WOUND, INITIAL ENCOUNTER: ICD-10-CM

## 2017-11-17 DIAGNOSIS — L97.522 DIABETIC ULCER OF TOE OF LEFT FOOT ASSOCIATED WITH TYPE 2 DIABETES MELLITUS, WITH FAT LAYER EXPOSED (HCC): ICD-10-CM

## 2017-11-17 RX ORDER — LEVOFLOXACIN 750 MG/1
750 TABLET ORAL DAILY
Qty: 10 TABLET | Refills: 0 | Status: SHIPPED | OUTPATIENT
Start: 2017-11-17 | End: 2017-11-27

## 2017-11-17 NOTE — PROGRESS NOTES
OUTPATIENT SPECIALTY PODIATRY VISIT      Nursing Progress Note      November 17, 2017  James Kennedy    Patient description of the problem: post-op wound check    Observations: DSD on surgi shoe right, Cam boot left    Ambulates: without assistance    Gait: Abnormal  Boot on     Assistive Devices: none    Fall History: No    Foot Hygiene: bad    Foot Wear Proper: Yes    Impaired Skin Integrity: Yes sutures in right wound on bottom of foot draining, Left foot healing very scabby both legs very red     Pain Assessment:  Pain Present: yes  Pain Score: 2/10  Pain Quality/Description: Throbbing  Pain Onset: 4 months ago  Pain Goal of patient: 2/10    Education Assessment:    Identify the learner who is being assessed for education:  patient                    Ability to Learn:  Exhibits ability to grasp concepts and respond to questions: Medium  Ready to Learn: Yes  calm   Preferred Method of Learning:  written  Barriers to Learning: [employer] interest  Special Considerations due to cultural, Sikh, spiritual beliefs:  No  Language:  English  :  No    Comments:  Legs hurt worse when walking    Pavel Herrera  3:45 PM 11/17/2017

## 2017-11-17 NOTE — PROGRESS NOTES
.  Department of Podiatry  Resident Progress Note    Lydia Whitlock  Allergies: Sulfa antibiotics    SUBJECTIVE  The patient is a 45 y.o. female who presents to clinic today for follow up of a bilateral foot wound. She is post op R EGR done 10/20/17, Seen in ED 10/23/17 for b/l venous stasis dermatitis and cellulitis treated with oral abx. States is feeling much better. Patient denies of any new complaints. Denies f/c/n/v/cp. Past Medical History:         Diagnosis Date    Amenorrhea     Anomalies of nails     Asthma 5/14/04    Athlete's foot 8/2010    Bacterial vaginosis 04/2008    Carpal tunnel syndrome 5/2007    Diabetes mellitus type II 08/2007    Diabetic neuropathy ([facility] [address] 8/10    DVT (deep venous thrombosis) ([facility] [address] 3/2004    Dyslipidemia 5/2009    Dyspareunia 05/2009    ETOH abuse 3/04/2007    Feet clawing     HTN (hypertension)     MRSA (methicillin resistant staph aureus) culture positive 11/06/2017    foot    Neuropathy ([facility] [address] 05/2009    polyneuropathy    Pain, back 04/2008    Pain, eye, right 5/14/04    Pancreatitis 5/14/04    Pseudocyst, pancreas 5/14/04    Scalp lesion 08/2007    Tinea pedis     Tobacco abuse 03/2008    Vaginal bleeding, abnormal 6/2007       REVIEW OF SYSTEMS:  CONSTITUTIONAL:  negative  RESPIRATORY:  negative  CARDIOVASCULAR:  negative  GASTROINTESTINAL:  negative  MUSCULOSKELETAL:  negative  NEUROLOGICAL:  negative    OBJECTIVE  VASCULAR: DP and PT pulses are palpable 1/4 b/l. CFT is brisk to the digits of the foot b/l. Skin temperature is warm to R forefoot, left foot is cool. NEUROLOGIC: Gross and epicritic sensation is intact b/l.      DERMATOLOGIC:         LEFT:   Full thickness ulceration  At plantar aspect of 1st metatarsal head measuring:   (6/26): 2.0 cm x 2.0 cm x 0.2 cm    (7/14): 2.0 cm x 1.9 cm x 0.2 cm  (7/21): 2.0 cm x 2.0 cm x 0.2 cm  (7/28): 1.8 cm x 1.9 cm x 0.2 cm  (8/04): 2 cm x 2 cm x 0.2 cm  (8/11): 1.8 cm x 1.6 cm x 0.3 cm   (8/18): 1.8 cm x 2 cc x 0.2 cm  (8/25): 1.6 cm x 1.3 cm x 0.2 cm   (9/1): 1.1 cm x 1.2 cm x 0.2 cm   (9/22) 1.3cm x 1.3 cm   (10/2) : 1.0 cmx 1.0cm   (10/16): 0.6 cm x 0.8 cm  (10/30): 1.0cm x 1.0cm   (11/6): 1.0cmx1.0cmx 0.2cm   100%  granular post debridement. No signs of tracking, probing to bone, drainage, or signs of infection. Mild malodor. Left:  Full thickness wound on distal stump of the Hallux 1.8cm x 2cm x 0.1cm     RIGHT:   Partial thickness ulcer on the plantar 5th MPJ measuring:   (7/14): 0.9 x 0.7 x 0.1 cm   (7/21): 1 x 0.8 x  0.1 cm  (7/28): 0.7 cm x 0.5 cm x 0.1 cm   (8/11): 0.7 cm x 0.8 cm x 0.2 cm   (8/18): 1 cm x 1 cm x 0.1 cm  (8/25): 1 cm x 0.8 cm x 0.2 cm   (9/1): 1 cm x 1 cm x 0.2  (9/22) 1.2cm x 1.2cm   (10/2): 1.0cm x1.0cm x0.2cm   (10/16): 0.7 cm x 0.7 cm x0.4cm    (11/06): 1.0cmx1.0cmx0.4cm with drainage, pain and local erythema. (11/17): 1.0cm x 1.2 cm x 0.1cm     Right calf wound 2.4cm x 1.2cm x 0.3cm     100% granular wound with hyperkeratotic rim post debridement. R medial leg incision from gastroc recession, incision site is draining, erythematous, and tender to palpation. MUSCULOSKELETAL: No pain with palpation of the calf b/l. No pain with palpation of the left foot. Equinus with leg extension, b/l      ASSESSMENT  1. Cellulitis B/L LE   2. Full thickness ulceration, Carpenter grade II left foot  3. Full thickness ulceration, Carpenter grade II right foot  4. S/p Hallux amputation - Left (DOS; 02/24/2017) - HEALED  5. S/P R EGR 10/20/17  6. Diabetes mellitus, uncontrolled. 7. Tinea pedis B/L   8. Equinus B/L       PLAN  - patient seen and examined. Improvement noted to the left foot wound  - ESR 67, CBC 10.1 at last visit    - VSS, , pt had not taken her medication.   - Instructed pt to continue with PWB.   - Educated patient on the importance on lower and monitoring blood sugar level. - rx given for Levoquin 750mg qd for 10 days.   - encouraged patient to get MRI done as ordered. - f/u 1wk.        Izzy Haley, DPM   Podiatric Resident, PGY-1  Pager: [phone]  11/17/2017, 4:01 PM

## 2017-11-20 DIAGNOSIS — E78.5 DYSLIPIDEMIA: ICD-10-CM

## 2017-11-20 DIAGNOSIS — E11.42 DIABETIC POLYNEUROPATHY ASSOCIATED WITH TYPE 2 DIABETES MELLITUS (HCC): Chronic | ICD-10-CM

## 2017-11-20 RX ORDER — ATORVASTATIN CALCIUM 20 MG/1
20 TABLET, FILM COATED ORAL DAILY
Qty: 30 TABLET | Refills: 2
Start: 2017-11-20 | End: 2018-01-22 | Stop reason: SDUPTHER

## 2017-11-22 LAB
ANAEROBIC CULTURE: ABNORMAL
GRAM STAIN RESULT: ABNORMAL
ORGANISM: ABNORMAL
WOUND/ABSCESS: ABNORMAL
WOUND/ABSCESS: ABNORMAL

## 2017-11-27 RX ORDER — OMEPRAZOLE 20 MG/1
20 CAPSULE, DELAYED RELEASE ORAL DAILY
Qty: 30 CAPSULE | Refills: 3
Start: 2017-11-27 | End: 2018-02-22 | Stop reason: SDUPTHER

## 2017-12-01 ENCOUNTER — OFFICE VISIT (OUTPATIENT)
Dept: INTERNAL MEDICINE | Age: 52
End: 2017-12-01
Attending: PODIATRIST

## 2017-12-01 ENCOUNTER — HOSPITAL ENCOUNTER (OUTPATIENT)
Dept: OTHER | Age: 52
Discharge: HOME | End: 2017-12-31
Attending: EMERGENCY MEDICINE | Admitting: EMERGENCY MEDICINE

## 2017-12-01 DIAGNOSIS — S81.801S UNSPECIFIED OPEN WOUND, RIGHT LOWER LEG, SEQUELA: ICD-10-CM

## 2017-12-01 DIAGNOSIS — S91.301D OPEN WOUND OF RIGHT FOOT, SUBSEQUENT ENCOUNTER: Primary | ICD-10-CM

## 2017-12-01 DIAGNOSIS — E11.621 DIABETIC ULCER OF TOE OF LEFT FOOT ASSOCIATED WITH TYPE 2 DIABETES MELLITUS, WITH FAT LAYER EXPOSED (HCC): ICD-10-CM

## 2017-12-01 DIAGNOSIS — L97.522 DIABETIC ULCER OF TOE OF LEFT FOOT ASSOCIATED WITH TYPE 2 DIABETES MELLITUS, WITH FAT LAYER EXPOSED (HCC): ICD-10-CM

## 2017-12-01 NOTE — PATIENT INSTRUCTIONS
Return 1 week    Vasuclar study as ordered    Santyl to right leg wound every other day. Patient has Rx    Modesta to wounds bottom of both feet every other day.  Patient was given 2 packages    Referral to [place] [id number]

## 2017-12-04 DIAGNOSIS — I10 ESSENTIAL HYPERTENSION: ICD-10-CM

## 2017-12-04 RX ORDER — AMLODIPINE BESYLATE 10 MG/1
10 TABLET ORAL DAILY
Qty: 30 TABLET | Refills: 1
Start: 2017-12-04 | End: 2018-02-05 | Stop reason: SDUPTHER

## 2017-12-08 DIAGNOSIS — E11.42 TYPE 2 DIABETES MELLITUS WITH DIABETIC POLYNEUROPATHY, UNSPECIFIED LONG TERM INSULIN USE STATUS: ICD-10-CM

## 2017-12-11 NOTE — PROGRESS NOTES
OUTPATIENT SPECIALTY PODIATRY VISIT      Nursing Progress Note      December 1, 2017  Alvina Duran    Patient description of the problem: wound check both feet    Observations: dressings on  Ambulates: without assistance    Gait: Abnormal  Cam boot on     Assistive Devices: none    Fall History: No    Foot Hygiene: bad    Foot Wear Proper: Yes    Impaired Skin Integrity: Yes ulcers bottom of both feet      Pain Assessment:  Pain Present: yes  Pain Score: 3/10  Pain Quality/Description: Burning right foot  Pain Onset: 4 months ago  Pain Goal of patient: 2/10    Education Assessment:    Identify the learner who is being assessed for education:  patient                    Ability to Learn:  Exhibits ability to grasp concepts and respond to questions: Low  Ready to Learn: Yes  calm   Preferred Method of Learning:  written  Barriers to Learning: Verbalizes interest  Special Considerations due to cultural, Roman Catholic, spiritual beliefs:  No  Language:  English  :  No    Comments:  Last visit 2 weeks ago    Garth Lara  2:36 PM 12/1/2017
discussed with patient for roughly 30 minutes. - Upon walking into patient room, the room smelled of cigarette smoke. Patient states that she hasn't stopped smoking but is seeking help. I discussed with the patient that smoking delays healing and she understood but notes \"its just hard to stop cold turkey\". I offered patient help but said she would think about it. - Excisional debridement of left, right foot and right leg wound using a #15 blade down to and including subcutaneous tissue. Patient tolerated the procedure well with no complications. - Dressing: Modesta to the left and right foot wounds and a wet to dry to the right leg wound. - Home health care ordered to every other day dressing changes.   - Rx: Santyl, to be applied to the right leg wound on a daily basis. - XR right foot ordered; to be reviewed during next visit. Pending results then will call insurance company for approval of MRI of the right foot. - Arterial studies ordered to patient. Given the chronic nature of the patients wounds and history of 1 pack of day smoker, it's in the patients best interest to receive vascular studies as a Vascular consult may be warranted. - Off loading pads made for patient's surgical show and CAM boot for off-loading of wounds. - Instructed patient to remain non weightbearing as much as possible. - Educated patient on the importance on lower and monitoring blood sugar level. - Patient RTC 1 week for follow up.        Brad Vincent DPM   Podiatric Resident, PGY-2  Pager: [phone]   12/11/2017, 7:27 AM

## 2017-12-22 ENCOUNTER — OFFICE VISIT (OUTPATIENT)
Dept: INTERNAL MEDICINE | Age: 52
End: 2017-12-22
Attending: PODIATRIST

## 2017-12-22 DIAGNOSIS — E11.621 DIABETIC ULCER OF LEFT MIDFOOT ASSOCIATED WITH TYPE 2 DIABETES MELLITUS, WITH FAT LAYER EXPOSED (HCC): Primary | ICD-10-CM

## 2017-12-22 DIAGNOSIS — S81.801S NON-HEALING WOUND OF LOWER EXTREMITY, RIGHT, SEQUELA: ICD-10-CM

## 2017-12-22 DIAGNOSIS — S91.301D WOUND, OPEN, FOOT, RIGHT, SUBSEQUENT ENCOUNTER: ICD-10-CM

## 2017-12-22 DIAGNOSIS — S91.301D OPEN WOUND OF RIGHT FOOT, SUBSEQUENT ENCOUNTER: ICD-10-CM

## 2017-12-22 DIAGNOSIS — S91.309D OPEN WOUND OF FOOT, UNSPECIFIED LATERALITY, SUBSEQUENT ENCOUNTER: ICD-10-CM

## 2017-12-22 DIAGNOSIS — E11.42 TYPE 2 DIABETES MELLITUS WITH DIABETIC POLYNEUROPATHY, UNSPECIFIED LONG TERM INSULIN USE STATUS: ICD-10-CM

## 2017-12-22 DIAGNOSIS — L97.422 DIABETIC ULCER OF LEFT MIDFOOT ASSOCIATED WITH TYPE 2 DIABETES MELLITUS, WITH FAT LAYER EXPOSED (HCC): Primary | ICD-10-CM

## 2017-12-22 LAB
BASOPHILS ABSOLUTE: 0.1 K/UL (ref 0–0.2)
BASOPHILS RELATIVE PERCENT: 0.5 %
EOSINOPHILS ABSOLUTE: 0.2 K/UL (ref 0–0.6)
EOSINOPHILS RELATIVE PERCENT: 2 %
HCT VFR BLD CALC: 40.6 % (ref 36–48)
HEMOGLOBIN: 13.4 G/DL (ref 12–16)
LYMPHOCYTES ABSOLUTE: 2.2 K/UL (ref 1–5.1)
LYMPHOCYTES RELATIVE PERCENT: 20.4 %
MCH RBC QN AUTO: 29.3 PG (ref 26–34)
MCHC RBC AUTO-ENTMCNC: 33 G/DL (ref 31–36)
MCV RBC AUTO: 89 FL (ref 80–100)
MONOCYTES ABSOLUTE: 0.6 K/UL (ref 0–1.3)
MONOCYTES RELATIVE PERCENT: 5.2 %
NEUTROPHILS ABSOLUTE: 7.8 K/UL (ref 1.7–7.7)
NEUTROPHILS RELATIVE PERCENT: 71.9 %
PDW BLD-RTO: 14.5 % (ref 12.4–15.4)
PLATELET # BLD: 336 K/UL (ref 135–450)
PMV BLD AUTO: 8.7 FL (ref 5–10.5)
RBC # BLD: 4.56 M/UL (ref 4–5.2)
SEDIMENTATION RATE, ERYTHROCYTE: 68 MM/HR (ref 0–30)
WBC # BLD: 10.9 K/UL (ref 4–11)

## 2017-12-22 NOTE — PROGRESS NOTES
Addendum:     Left message for Randy Ben on her voicemail, I spoke with Dr. Ayse Fabian of ID and he states we can use Zyvox to treat the osteo in her right foot, dosage will be 600mg PO BID for 8wks, since cx was positive for MRSA that was only sensitive to Vanc. Patient is not currently ill and an inpatient stay would be inappropriate.

## 2017-12-22 NOTE — PROGRESS NOTES
OUTPATIENT SPECIALTY PODIATRY VISIT      Nursing Progress Note      December 22, 2017  James Kennedy    Patient description of the problem: here for dressing changes bilat. feet and lesion medial right lower leg    Observations: pictures taken by  for the record    Ambulates: without assistance    Gait: uses scooter    Assistive Devices: scooter    Fall History: No    Foot Hygiene: good    Foot Wear Proper: Yes; Cam boot left leg and surgical shoe on right foot    Impaired Skin Integrity: Yes      Pain Assessment:  Pain Present: yes  Pain Score: 7/10  right foot hurts with walking  Pain Quality/Description: Aching and Throbbing  Pain Onset:10/2017  Pain Goal of patient:4    Education Assessment:    Identify the learner who is being assessed for education:  patient                    Ability to Learn:  Exhibits ability to grasp concepts and respond to questions: High  Ready to Learn: Yes  calm   Preferred Method of Learning:  written  Barriers to Learning: Verbalizes interest  Special Considerations due to cultural, Shinto, spiritual beliefs:  No  Language:  English  :  No    Comments:  has home health nurse     Petty Alejandro  3:00 PM 12/22/2017

## 2017-12-26 ENCOUNTER — TELEPHONE (OUTPATIENT)
Dept: INTERNAL MEDICINE | Age: 52
End: 2017-12-26

## 2017-12-26 DIAGNOSIS — Z71.6 ENCOUNTER FOR SMOKING CESSATION COUNSELING: ICD-10-CM

## 2017-12-26 RX ORDER — BUPROPION HYDROCHLORIDE 150 MG/1
150 TABLET, EXTENDED RELEASE ORAL 2 TIMES DAILY
Qty: 60 TABLET | Refills: 5
Start: 2017-12-26 | End: 2018-01-22 | Stop reason: SDUPTHER

## 2017-12-26 RX ORDER — LINEZOLID 600 MG/1
600 TABLET, FILM COATED ORAL 2 TIMES DAILY
Qty: 112 TABLET | Refills: 0
Start: 2017-12-26 | End: 2018-02-20

## 2017-12-26 NOTE — TELEPHONE ENCOUNTER
paged ie call from pharmacy about contraindication of taking wellbutrin, Zyvox and amitriptyline together( due to possible serum serotonin syndrome) Dr. Ashok Funez not available. AOD  called and informed of above call  and ordered to [address] while on Zyvox for 8 weeks . Dr. Alison Mai will call pharmacist to discuss taking only Wellbutrin with Zyvox. Dr. Evaristo Quintero wants pt to remain on Zyvox as suggested by Yani Mercado due to MRSA of foot and culture shows results only sensitive to Vanco see culture report. Dr. Alison Mai called again and states he spoke to pharmacist at [facility]. Pt. can remain on Wellbutrin for now but needs to follow up soon with Dr. Ashok Funez. No appointment in [facility] for Dr. Ashok Funez - pt has cancelled last several appointments. Message left on patient's voice mail that she should  and start Zyvox as soon as possible but she can not take it with Amitriptyline. She can continue her Wellbutrin but MUST come in to see  for a follow up soon. Pt instructed to call back in the morning to let the clinic know she received this message.

## 2017-12-26 NOTE — TELEPHONE ENCOUNTER
Call from [facility] [phone] who states since patient is on both Wellbutrin and Amitriptyline the 12/22 new order of Zyvox by  podiatry . See note. Pharmacist would like to verify that the physician is aware that the Zyvox is contraindicated before they fill the prescription. Dr. Adelaide Claire paged.

## 2018-01-04 ENCOUNTER — TELEPHONE (OUTPATIENT)
Dept: INTERNAL MEDICINE | Age: 53
End: 2018-01-04

## 2018-01-05 ENCOUNTER — OFFICE VISIT (OUTPATIENT)
Dept: INTERNAL MEDICINE | Age: 53
End: 2018-01-05
Attending: PODIATRIST

## 2018-01-05 DIAGNOSIS — E11.621 DIABETIC ULCER OF LEFT MIDFOOT ASSOCIATED WITH TYPE 2 DIABETES MELLITUS, WITH NECROSIS OF MUSCLE (HCC): ICD-10-CM

## 2018-01-05 DIAGNOSIS — L97.423 DIABETIC ULCER OF LEFT MIDFOOT ASSOCIATED WITH TYPE 2 DIABETES MELLITUS, WITH NECROSIS OF MUSCLE (HCC): ICD-10-CM

## 2018-01-05 DIAGNOSIS — S91.301S OPEN WOUND OF RIGHT FOOT, SEQUELA: Primary | ICD-10-CM

## 2018-01-05 NOTE — PROGRESS NOTES
.  Department of Podiatry  Resident Progress Note    Olivia Grace  Allergies: Sulfa antibiotics    SUBJECTIVE  The patient is a 45 y.o. female who presents to clinic today for follow up of a bilateral foot wound. She is post op R EGR done 10/20/17. Patient states that she missed her previous 2 weeks of appointments because her car broke down. Patient notes that she tried to get an MRI of her right foot but was denied because the insurance didn't accept. Patient denies of any new complaints. Denies f/c/n/v/cp. Past Medical History:         Diagnosis Date    Amenorrhea     Anomalies of nails     Asthma 5/14/04    Athlete's foot 8/2010    Bacterial vaginosis 04/2008    Carpal tunnel syndrome 5/2007    Diabetes mellitus type II 08/2007    Diabetic neuropathy ([address] 8/10    DVT (deep venous thrombosis) ([address] 3/2004    Dyslipidemia 5/2009    Dyspareunia 05/2009    ETOH abuse 3/04/2007    Feet clawing     HTN (hypertension)     MRSA (methicillin resistant staph aureus) culture positive 11/06/2017; 11/17/2017    foot; leg     Neuropathy ([address] 05/2009    polyneuropathy    Pain, back 04/2008    Pain, eye, right 5/14/04    Pancreatitis 5/14/04    Pseudocyst, pancreas 5/14/04    Scalp lesion 08/2007    Tinea pedis     Tobacco abuse 03/2008    Vaginal bleeding, abnormal 6/2007       REVIEW OF SYSTEMS:  CONSTITUTIONAL:  negative  RESPIRATORY:  negative  CARDIOVASCULAR:  negative  GASTROINTESTINAL:  negative  MUSCULOSKELETAL:  negative  NEUROLOGICAL:  negative    OBJECTIVE  VASCULAR: DP and PT pulses are palpable 1/4 b/l. CFT is brisk to the digits of the foot b/l. Skin temperature is warm to R forefoot, left foot is cool. NEUROLOGIC: Gross and epicritic sensation is intact b/l.      DERMATOLOGIC:                   LEFT:   Full thickness ulceration  At plantar aspect of 1st metatarsal head measuring:   (6/26): 2.0 cm x 2.0 cm x 0.2 cm    (7/14): 2.0 cm x 1.9 cm x 0.2 cm  (7/21): 2.0 cm x 2.0 cm x 0.2 cm  (7/28): 1.8 cm x 1.9 cm x 0.2 cm  (8/04): 2 cm x 2 cm x 0.2 cm  (8/11): 1.8 cm x 1.6 cm x 0.3 cm   (8/18): 1.8 cm x 2 cc x 0.2 cm  (8/25): 1.6 cm x 1.3 cm x 0.2 cm   (9/1): 1.1 cm x 1.2 cm x 0.2 cm   (9/22) 1.3cm x 1.3 cm   (10/2) : 1.0 cmx 1.0cm   (10/16): 0.6 cm x 0.8 cm  (10/30): 1.0cm x 1.0cm   (11/6): 1.0cmx1.0cmx 0.2cm   100%  granular post debridement. No signs of tracking, probing to bone, drainage, or signs of infection. Mild malodor. (12/1/2017): 2.1 x 1.5 x 0.3 cm   -post- debridement 100% granular base with no acute clinical signs of infection present. No tracking or probing. No drainage, no malodor, no increased erythema.   (12/22/17) post debridement 0.3x0.3cm, no tracking, granular base. RIGHT:   Full thickness ulcer on the plantar 5th MPJ measuring:   (7/14): 0.9 x 0.7 x 0.1 cm   (7/21): 1 x 0.8 x  0.1 cm  (7/28): 0.7 cm x 0.5 cm x 0.1 cm   (8/11): 0.7 cm x 0.8 cm x 0.2 cm   (8/18): 1 cm x 1 cm x 0.1 cm  (8/25): 1 cm x 0.8 cm x 0.2 cm   (9/1): 1 cm x 1 cm x 0.2  (9/22) 1.2cm x 1.2cm   (10/2): 1.0cm x1.0cm x0.2cm   (10/16): 0.7 cm x 0.7 cm x0.4cm    (11/06): 1.0cmx1.0cmx0.4cm with drainage, pain and local erythema. (11/17): 1.0cm x 1.2 cm x 0.1cm   (12/1/17): 0.7 x 0.6 x 0.1 cm  (12/22/17) post debridement 1.0x1.0cm with pain and local erythema, no drainage. Right calf wound 2.2cm x 0.9cm x 0.3cm   -100% Fibrotic base with hyperkeratotic rim. No acute clinical signs of infection present at this time. MUSCULOSKELETAL: Deferred due to patient status. ASSESSMENT  1. Cellulitis B/L LE   2. Full thickness ulceration, Carpenter grade II left foot  3. Full thickness ulceration, Carpenter grade II right foot  4. S/p Hallux amputation - Left (DOS; 02/24/2017) - HEALED  5. S/P R EGR 10/20/17  6. Diabetes mellitus, uncontrolled. 7. Tinea pedis B/L   8. Equinus B/L     PLAN  - Patient seen and examined. Etiology and treatment options discussed with patient for roughly 30 minutes.    -

## 2018-01-05 NOTE — PROGRESS NOTES
OUTPATIENT SPECIALTY PODIATRY VISIT      Nursing Progress Note      January 5, 2018  Chai Cueto    Patient description of the problem: wound check    Observations: Dressings on    Ambulates: without assistance    Gait: Abnormal  Cam boot and surgi-shoe     Assistive Devices: none    Fall History: No    Foot Hygiene: poor    Foot Wear Proper: Yes    Impaired Skin Integrity: Yes      Pain Assessment:  Pain Present: yes  Pain Score: 5/10  Pain Quality/Description: Aching  Pain Onset: 6 months ago  Pain Goal of patient: 2/10    Education Assessment:    Identify the learner who is being assessed for education:  patient                    Ability to Learn:  Exhibits ability to grasp concepts and respond to questions: Medium  Ready to Learn: Yes  calm   Preferred Method of Learning:  written  Barriers to Learning: Verbalizes interest  Special Considerations due to cultural, Shinto, spiritual beliefs:  No  Language:  English  :  No    Comments:    Has not has dressing change in 9 days.       Baby Domonique  2:53 PM 1/5/2018

## 2018-01-19 ENCOUNTER — OFFICE VISIT (OUTPATIENT)
Dept: INTERNAL MEDICINE | Age: 53
End: 2018-01-19
Attending: PODIATRIST

## 2018-01-19 DIAGNOSIS — L97.422 DIABETIC ULCER OF LEFT MIDFOOT ASSOCIATED WITH TYPE 2 DIABETES MELLITUS, WITH FAT LAYER EXPOSED (HCC): ICD-10-CM

## 2018-01-19 DIAGNOSIS — E11.621 DIABETIC ULCER OF LEFT MIDFOOT ASSOCIATED WITH TYPE 2 DIABETES MELLITUS, WITH FAT LAYER EXPOSED (HCC): ICD-10-CM

## 2018-01-19 DIAGNOSIS — S91.301D OPEN WOUND OF RIGHT FOOT, SUBSEQUENT ENCOUNTER: ICD-10-CM
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
## 2018-01-19 NOTE — PATIENT INSTRUCTIONS
Return to clinic 1 week    Dressing changes every other Day with Modesta    Heel weight bearing    Labs drawn and sent STAT to lab

## 2018-01-19 NOTE — PROGRESS NOTES
(10/2) : 1.0 cmx 1.0cm   (10/16): 0.6 cm x 0.8 cm  (10/30): 1.0cm x 1.0cm   (11/6): 1.0cmx1.0cmx 0.2cm   100%  granular post debridement. No signs of tracking, probing to bone, drainage, or signs of infection. Mild malodor. (12/1/2017): 2.1 x 1.5 x 0.3 cm   -post- debridement 100% granular base with no acute clinical signs of infection present. No tracking or probing. No drainage, no malodor, no increased erythema.   (12/22/17) post debridement 0.3x0.3cm, no tracking, granular base. (1/19/18) post debridement 0.3x0.3cm    RIGHT:   Full thickness ulcer on the plantar 5th MPJ measuring:   (7/14): 0.9 x 0.7 x 0.1 cm   (7/21): 1 x 0.8 x  0.1 cm  (7/28): 0.7 cm x 0.5 cm x 0.1 cm   (8/11): 0.7 cm x 0.8 cm x 0.2 cm   (8/18): 1 cm x 1 cm x 0.1 cm  (8/25): 1 cm x 0.8 cm x 0.2 cm   (9/1): 1 cm x 1 cm x 0.2  (9/22) 1.2cm x 1.2cm   (10/2): 1.0cm x1.0cm x0.2cm   (10/16): 0.7 cm x 0.7 cm x0.4cm    (11/06): 1.0cmx1.0cmx0.4cm with drainage, pain and local erythema. (11/17): 1.0cm x 1.2 cm x 0.1cm   (12/1/17): 0.7 x 0.6 x 0.1 cm  (12/22/17) post debridement 1.0x1.0cm with pain and local erythema, no drainage.   (1/19/18) post debridement 1.0x1.0cm    Right calf wound 2.2cm x 0.9cm x 0.3cm   -100% Fibrotic base with hyperkeratotic rim. No acute clinical signs of infection present at this time. MUSCULOSKELETAL: Deferred due to patient status. ASSESSMENT  1. Cellulitis B/L LE   2. Full thickness ulceration, Carpenter grade II left foot  3. Full thickness ulceration, Carpenter grade II right foot  4. S/p Hallux amputation - Left (DOS; 02/24/2017) - HEALED  5. S/P R EGR 10/20/17  6. Diabetes mellitus, uncontrolled. 7. Tinea pedis B/L   8. Equinus B/L     PLAN  - Patient seen and examined. Etiology and treatment options discussed with patient for roughly 30 minutes. - Excisional debridement of left, right foot and right leg wound using a #15 blade down to and including subcutaneous tissue.  Patient tolerated the procedure

## 2018-01-22 DIAGNOSIS — Z71.6 ENCOUNTER FOR SMOKING CESSATION COUNSELING: ICD-10-CM

## 2018-01-22 DIAGNOSIS — E11.42 DIABETIC POLYNEUROPATHY ASSOCIATED WITH TYPE 2 DIABETES MELLITUS (HCC): Chronic | ICD-10-CM

## 2018-01-22 DIAGNOSIS — E78.5 DYSLIPIDEMIA: ICD-10-CM

## 2018-01-22 RX ORDER — BUPROPION HYDROCHLORIDE 150 MG/1
150 TABLET, EXTENDED RELEASE ORAL 2 TIMES DAILY
Qty: 60 TABLET | Refills: 0
Start: 2018-01-22 | End: 2018-03-05 | Stop reason: SDUPTHER

## 2018-01-22 RX ORDER — ATORVASTATIN CALCIUM 20 MG/1
20 TABLET, FILM COATED ORAL DAILY
Qty: 30 TABLET | Refills: 0
Start: 2018-01-22 | End: 2018-02-22 | Stop reason: SDUPTHER

## 2018-01-29 ENCOUNTER — OFFICE VISIT (OUTPATIENT)
Dept: INTERNAL MEDICINE | Age: 53
End: 2018-01-29
Attending: PODIATRIST

## 2018-01-29 DIAGNOSIS — S91.301S OPEN WOUND OF RIGHT FOOT, SEQUELA: Primary | ICD-10-CM

## 2018-01-29 DIAGNOSIS — E11.621 DIABETIC ULCER OF LEFT HEEL ASSOCIATED WITH TYPE 2 DIABETES MELLITUS, WITH FAT LAYER EXPOSED (HCC): ICD-10-CM

## 2018-01-29 DIAGNOSIS — L97.422 DIABETIC ULCER OF LEFT HEEL ASSOCIATED WITH TYPE 2 DIABETES MELLITUS, WITH FAT LAYER EXPOSED (HCC): ICD-10-CM

## 2018-01-29 RX ORDER — AMMONIUM LACTATE 12 G/100G
LOTION TOPICAL
Qty: 222 ML | Refills: 2 | Status: SHIPPED | OUTPATIENT
Start: 2018-01-29 | End: 2019-06-09

## 2018-01-29 NOTE — PROGRESS NOTES
.  Department of Podiatry  Resident Progress Note    Dimitris Redmond  Allergies: Sulfa antibiotics    SUBJECTIVE  The patient is a 45 y.o. female who presents to clinic today for follow up of a bilateral foot wound. She is post op R EGR done 10/20/17. Patient denies of any new complaints. Denies f/c/n/v/cp. Past Medical History:         Diagnosis Date    Amenorrhea     Anomalies of nails     Asthma 5/14/04    Athlete's foot 8/2010    Bacterial vaginosis 04/2008    Carpal tunnel syndrome 5/2007    Diabetes mellitus type II 08/2007    Diabetic [address] [address] 8/10    DVT (deep venous thrombosis) ([facility] [address] 3/2004    Dyslipidemia 5/2009    Dyspareunia 05/2009    ETOH abuse 3/04/2007    Feet clawing     HTN (hypertension)     MRSA (methicillin resistant staph aureus) culture positive 11/06/2017; 11/17/2017    foot; leg     Neuropathy ([facility] [address] 05/2009    polyneuropathy    Pain, back 04/2008    Pain, eye, right 5/14/04    Pancreatitis 5/14/04    Pseudocyst, pancreas 5/14/04    Scalp lesion 08/2007    Tinea pedis     Tobacco abuse 03/2008    Vaginal bleeding, abnormal 6/2007       REVIEW OF SYSTEMS:  CONSTITUTIONAL:  negative  RESPIRATORY:  negative  CARDIOVASCULAR:  negative  GASTROINTESTINAL:  negative  MUSCULOSKELETAL:  negative  NEUROLOGICAL:  negative    OBJECTIVE  VASCULAR: DP and PT pulses are palpable 1/4 b/l. CFT is brisk to the digits of the foot b/l. Skin temperature is warm to R forefoot, left foot is cool. NEUROLOGIC: Gross and epicritic sensation is intact b/l.      DERMATOLOGIC:   LEFT:   Full thickness ulceration  At plantar aspect of 1st metatarsal head measuring:   (6/26): 2.0 cm x 2.0 cm x 0.2 cm    (7/14): 2.0 cm x 1.9 cm x 0.2 cm  (7/21): 2.0 cm x 2.0 cm x 0.2 cm  (7/28): 1.8 cm x 1.9 cm x 0.2 cm  (8/04): 2 cm x 2 cm x 0.2 cm  (8/11): 1.8 cm x 1.6 cm x 0.3 cm   (8/18): 1.8 cm x 2 cc x 0.2 cm  (8/25): 1.6 cm x 1.3 cm x 0.2 cm   (9/1): 1.1 cm x 1.2 cm x 0.2 cm   (9/22) 1.3cm x 1.3 cm (10/2) : 1.0 cmx 1.0cm   (10/16): 0.6 cm x 0.8 cm  (10/30): 1.0cm x 1.0cm   (11/6): 1.0cmx1.0cmx 0.2cm   100%  granular post debridement. No signs of tracking, probing to bone, drainage, or signs of infection. Mild malodor. (12/1/2017): 2.1 x 1.5 x 0.3 cm   -post- debridement 100% granular base with no acute clinical signs of infection present. No tracking or probing. No drainage, no malodor, no increased erythema.   (12/22/17) post debridement 0.3x0.3cm, no tracking, granular base. (1/19/18) post debridement 0.3x0.3cm  (1/29/18) Not debrided   RIGHT:   Full thickness ulcer on the plantar 5th MPJ measuring:   (7/14): 0.9 x 0.7 x 0.1 cm   (7/21): 1 x 0.8 x  0.1 cm  (7/28): 0.7 cm x 0.5 cm x 0.1 cm   (8/11): 0.7 cm x 0.8 cm x 0.2 cm   (8/18): 1 cm x 1 cm x 0.1 cm  (8/25): 1 cm x 0.8 cm x 0.2 cm   (9/1): 1 cm x 1 cm x 0.2  (9/22) 1.2cm x 1.2cm   (10/2): 1.0cm x1.0cm x0.2cm   (10/16): 0.7 cm x 0.7 cm x0.4cm    (11/06): 1.0cmx1.0cmx0.4cm with drainage, pain and local erythema. (11/17): 1.0cm x 1.2 cm x 0.1cm   (12/1/17): 0.7 x 0.6 x 0.1 cm  (12/22/17) post debridement 1.0x1.0cm with pain and local erythema, no drainage.   (1/19/18) post debridement 1.0x1.0cm  (1/29/18) grossly unchanged. Right calf wound 2.2cm x 0.9cm x 0.3cm   - 1/29/18 wound now 0.2cm x 0.2cm     MUSCULOSKELETAL: Deferred due to patient status. ASSESSMENT  1. Cellulitis B/L LE   2. Full thickness ulceration, Carpenter grade II left foot  3. Full thickness ulceration, Carpenter grade II right foot  4. S/p Hallux amputation - Left (DOS; 02/24/2017) - HEALED  5. S/P R EGR 10/20/17  6. Diabetes mellitus, uncontrolled. 7. Tinea pedis B/L   8. Equinus B/L     PLAN  - Patient seen and examined. Etiology and treatment options discussed with patient for roughly 30 minutes. - Excisional debridement of left, right foot and right leg wound using a #15 blade down to and including subcutaneous tissue.  Patient tolerated the procedure well with no

## 2018-01-31 DIAGNOSIS — L98.9 SKIN LESION OF SCALP: ICD-10-CM

## 2018-01-31 RX ORDER — GABAPENTIN 600 MG/1
600 TABLET ORAL 3 TIMES DAILY
Qty: 90 TABLET | Refills: 0
Start: 2018-01-31 | End: 2018-02-28 | Stop reason: SDUPTHER

## 2018-02-05 DIAGNOSIS — I10 ESSENTIAL HYPERTENSION: ICD-10-CM

## 2018-02-05 RX ORDER — AMLODIPINE BESYLATE 10 MG/1
10 TABLET ORAL DAILY
Qty: 30 TABLET | Refills: 2
Start: 2018-02-05 | End: 2018-03-05 | Stop reason: SDUPTHER

## 2018-02-09 ENCOUNTER — OFFICE VISIT (OUTPATIENT)
Dept: INTERNAL MEDICINE | Age: 53
End: 2018-02-09
Attending: PODIATRIST

## 2018-02-09 DIAGNOSIS — S91.301D OPEN WOUND OF RIGHT FOOT, SUBSEQUENT ENCOUNTER: Primary | ICD-10-CM

## 2018-02-09 NOTE — PROGRESS NOTES
.  Department of Podiatry  Resident Progress Note    Adilene Paccar  Allergies: Sulfa antibiotics    SUBJECTIVE  The patient is a 45 y.o. female who presents to clinic today for follow up of a bilateral foot wound. She is post op R EGR done 10/20/17. Patient denies of any new complaints. Denies f/c/n/v/cp. Past Medical History:         Diagnosis Date    Amenorrhea     Anomalies of nails     Asthma 5/14/04    Athlete's foot 8/2010    Bacterial vaginosis 04/2008    Carpal tunnel syndrome 5/2007    Diabetes mellitus type II 08/2007    Diabetic neuropathy ([facility] [address] 8/10    DVT (deep venous thrombosis) ([facility] [address] 3/2004    Dyslipidemia 5/2009    Dyspareunia 05/2009    ETOH abuse 3/04/2007    Feet clawing     HTN (hypertension)     MRSA (methicillin resistant staph aureus) culture positive 11/06/2017; 11/17/2017    foot; leg     Neuropathy ([facility] [address] 05/2009    polyneuropathy    Pain, back 04/2008    Pain, eye, right 5/14/04    Pancreatitis 5/14/04    Pseudocyst, pancreas 5/14/04    Scalp lesion 08/2007    Tinea pedis     Tobacco abuse 03/2008    Vaginal bleeding, abnormal 6/2007       REVIEW OF SYSTEMS:  CONSTITUTIONAL:  negative  RESPIRATORY:  negative  CARDIOVASCULAR:  negative  GASTROINTESTINAL:  negative  MUSCULOSKELETAL:  negative  NEUROLOGICAL:  negative    OBJECTIVE  VASCULAR: DP and PT pulses are palpable 1/4 b/l. CFT is brisk to the digits of the foot b/l. Skin temperature is warm to R forefoot, left foot is cool. NEUROLOGIC: Gross and epicritic sensation is intact b/l.      DERMATOLOGIC:   LEFT:   Full thickness ulceration  At plantar aspect of 1st metatarsal head measuring:   (6/26): 2.0 cm x 2.0 cm x 0.2 cm    (7/14): 2.0 cm x 1.9 cm x 0.2 cm  (7/21): 2.0 cm x 2.0 cm x 0.2 cm  (7/28): 1.8 cm x 1.9 cm x 0.2 cm  (8/04): 2 cm x 2 cm x 0.2 cm  (8/11): 1.8 cm x 1.6 cm x 0.3 cm   (8/18): 1.8 cm x 2 cc x 0.2 cm  (8/25): 1.6 cm x 1.3 cm x 0.2 cm   (9/1): 1.1 cm x 1.2 cm x 0.2 cm   (9/22) 1.3cm x 1.3 cm

## 2018-02-09 NOTE — PROGRESS NOTES
OUTPATIENT SPECIALTY PODIATRY VISIT      Nursing Progress Note      February 9, 2018  Young Gentile    Patient description of the problem: ulcer bot feet. Both dry and looking better. Home health will no longer see her. She  Is clearly not home bound as she is never home whn they go to se her. .    Observations:     Ambulates: without assistance    Gait: Normal     Assistive Devices: straight cane    Fall History: No    Foot Hygiene: poor    Foot Wear Proper: Yes    Impaired Skin Integrity: Yes      Pain Assessment:  Pain Present: no  Pain Score: 0/10  Pain Quality/Description:   Pain Onset:   ago  Pain Goal of patient: /10    Education Assessment:    Identify the learner who is being assessed for education:  patient                    Ability to Learn:  Exhibits ability to grasp concepts and respond to questions: Medium  Ready to Learn: Yes  calm   Preferred Method of Learning:  written  Barriers to Learning: Verbalizes interest  Special Considerations due to cultural, Church, spiritual beliefs:  No  Language:  English  :  No    CommentsCarina Lara Page  2:45 PM 2/9/2018

## 2018-02-09 NOTE — PATIENT INSTRUCTIONS
Modesta to right foot only- do dressing changes every other day    Wash feet twice a week with mild dish soap.  Apply lac-hytrin- but avoid any open areas    Return to clinic Monday Feb. 19

## 2018-02-22 DIAGNOSIS — E11.42 DIABETIC POLYNEUROPATHY ASSOCIATED WITH TYPE 2 DIABETES MELLITUS (HCC): Chronic | ICD-10-CM

## 2018-02-22 DIAGNOSIS — E78.5 DYSLIPIDEMIA: ICD-10-CM

## 2018-02-22 RX ORDER — ATORVASTATIN CALCIUM 20 MG/1
20 TABLET, FILM COATED ORAL DAILY
Qty: 30 TABLET | Refills: 1
Start: 2018-02-22 | End: 2018-03-05 | Stop reason: SDUPTHER

## 2018-02-22 RX ORDER — OMEPRAZOLE 20 MG/1
20 CAPSULE, DELAYED RELEASE ORAL DAILY
Qty: 30 CAPSULE | Refills: 1
Start: 2018-02-22 | End: 2018-03-05 | Stop reason: SDUPTHER

## 2018-02-23 ENCOUNTER — OFFICE VISIT (OUTPATIENT)
Dept: INTERNAL MEDICINE | Age: 53
End: 2018-02-23
Attending: PODIATRIST

## 2018-02-23 DIAGNOSIS — L97.422 DIABETIC ULCER OF LEFT HEEL ASSOCIATED WITH TYPE 2 DIABETES MELLITUS, WITH FAT LAYER EXPOSED (HCC): Primary | ICD-10-CM

## 2018-02-23 DIAGNOSIS — S91.301S OPEN WOUND OF RIGHT FOOT, SEQUELA: ICD-10-CM

## 2018-02-23 DIAGNOSIS — E11.621 DIABETIC ULCER OF LEFT HEEL ASSOCIATED WITH TYPE 2 DIABETES MELLITUS, WITH FAT LAYER EXPOSED (HCC): Primary | ICD-10-CM

## 2018-02-23 NOTE — PROGRESS NOTES
.  Department of Podiatry  Resident Progress Note    Adilene Paccar  Allergies: Sulfa antibiotics    SUBJECTIVE  The patient is a 45 y.o. female who presents to clinic today for follow up of a bilateral foot wound. She is post op R EGR done 10/20/17. Patient denies of any new complaints. Denies f/c/n/v/cp. Past Medical History:         Diagnosis Date    Amenorrhea     Anomalies of nails     Asthma 5/14/04    Athlete's foot 8/2010    Bacterial vaginosis 04/2008    Carpal tunnel syndrome 5/2007    Diabetes mellitus type II 08/2007    Diabetic neuropathy ([facility] [address] 8/10    DVT (deep venous thrombosis) ([facility] [address] 3/2004    Dyslipidemia 5/2009    Dyspareunia 05/2009    ETOH abuse 3/04/2007    Feet clawing     HTN (hypertension)     MRSA (methicillin resistant staph aureus) culture positive 11/06/2017; 11/17/2017    foot; leg     Neuropathy ([facility] 05/2009    polyneuropathy    Pain, back 04/2008    Pain, eye, right 5/14/04    Pancreatitis 5/14/04    Pseudocyst, pancreas 5/14/04    Scalp lesion 08/2007    Tinea pedis     Tobacco abuse 03/2008    Vaginal bleeding, abnormal 6/2007       REVIEW OF SYSTEMS:  CONSTITUTIONAL:  negative  RESPIRATORY:  negative  CARDIOVASCULAR:  negative  GASTROINTESTINAL:  negative  MUSCULOSKELETAL:  negative  NEUROLOGICAL:  negative    OBJECTIVE  VASCULAR: DP and PT pulses are palpable 1/4 b/l. CFT is brisk to the digits of the foot b/l. Skin temperature is warm to R forefoot, left foot is cool. NEUROLOGIC: Gross and epicritic sensation is intact b/l.      DERMATOLOGIC:   LEFT:   Full thickness ulceration  At plantar aspect of 1st metatarsal head measuring:   (6/26): 2.0 cm x 2.0 cm x 0.2 cm    (7/14): 2.0 cm x 1.9 cm x 0.2 cm  (7/21): 2.0 cm x 2.0 cm x 0.2 cm  (7/28): 1.8 cm x 1.9 cm x 0.2 cm  (8/04): 2 cm x 2 cm x 0.2 cm  (8/11): 1.8 cm x 1.6 cm x 0.3 cm   (8/18): 1.8 cm x 2 cc x 0.2 cm  (8/25): 1.6 cm x 1.3 cm x 0.2 cm   (9/1): 1.1 cm x 1.2 cm x 0.2 cm   (9/22) 1.3cm x 1.3 cm

## 2018-02-28 DIAGNOSIS — L98.9 SKIN LESION OF SCALP: ICD-10-CM

## 2018-02-28 RX ORDER — GABAPENTIN 600 MG/1
600 TABLET ORAL 3 TIMES DAILY
Qty: 90 TABLET | Refills: 3
Start: 2018-02-28 | End: 2018-03-05 | Stop reason: SDUPTHER

## 2018-03-05 ENCOUNTER — OFFICE VISIT (OUTPATIENT)
Dept: INTERNAL MEDICINE | Age: 53
End: 2018-03-05
Attending: PODIATRIST

## 2018-03-05 ENCOUNTER — OFFICE VISIT (OUTPATIENT)
Dept: INTERNAL MEDICINE | Age: 53
End: 2018-03-05
Attending: INTERNAL MEDICINE

## 2018-03-05 VITALS
SYSTOLIC BLOOD PRESSURE: 150 MMHG | WEIGHT: 196.8 LBS | RESPIRATION RATE: 20 BRPM | OXYGEN SATURATION: 94 % | BODY MASS INDEX: 36.22 KG/M2 | HEIGHT: 62 IN | TEMPERATURE: 98 F | HEART RATE: 89 BPM | DIASTOLIC BLOOD PRESSURE: 82 MMHG

## 2018-03-05 DIAGNOSIS — Z79.4 TYPE 2 DIABETES MELLITUS WITH DIABETIC POLYNEUROPATHY, WITH LONG-TERM CURRENT USE OF INSULIN (HCC): ICD-10-CM

## 2018-03-05 DIAGNOSIS — L97.422 DIABETIC ULCER OF LEFT HEEL ASSOCIATED WITH TYPE 2 DIABETES MELLITUS, WITH FAT LAYER EXPOSED (HCC): Primary | ICD-10-CM

## 2018-03-05 DIAGNOSIS — E11.621 DIABETIC ULCER OF LEFT HEEL ASSOCIATED WITH TYPE 2 DIABETES MELLITUS, WITH FAT LAYER EXPOSED (HCC): Primary | ICD-10-CM

## 2018-03-05 DIAGNOSIS — E11.42 DIABETIC POLYNEUROPATHY ASSOCIATED WITH TYPE 2 DIABETES MELLITUS (HCC): Chronic | ICD-10-CM

## 2018-03-05 DIAGNOSIS — E78.5 DYSLIPIDEMIA: ICD-10-CM

## 2018-03-05 DIAGNOSIS — G44.89 OTHER HEADACHE SYNDROME: Primary | ICD-10-CM

## 2018-03-05 DIAGNOSIS — E11.42 TYPE 2 DIABETES MELLITUS WITH DIABETIC POLYNEUROPATHY, WITH LONG-TERM CURRENT USE OF INSULIN (HCC): ICD-10-CM

## 2018-03-05 DIAGNOSIS — I10 ESSENTIAL HYPERTENSION: ICD-10-CM

## 2018-03-05 DIAGNOSIS — S91.301S OPEN WOUND OF RIGHT FOOT, SEQUELA: ICD-10-CM

## 2018-03-05 DIAGNOSIS — Z71.6 ENCOUNTER FOR SMOKING CESSATION COUNSELING: ICD-10-CM

## 2018-03-05 LAB
GLUCOSE BLD-MCNC: 153 MG/DL (ref 70–99)
PERFORMED ON: ABNORMAL

## 2018-03-05 RX ORDER — OMEPRAZOLE 20 MG/1
20 CAPSULE, DELAYED RELEASE ORAL DAILY
Qty: 30 CAPSULE | Refills: 1 | Status: SHIPPED | OUTPATIENT
Start: 2018-03-05 | End: 2018-05-21 | Stop reason: SDUPTHER

## 2018-03-05 RX ORDER — BUPROPION HYDROCHLORIDE 150 MG/1
150 TABLET, EXTENDED RELEASE ORAL 2 TIMES DAILY
Qty: 60 TABLET | Refills: 0 | Status: ON HOLD | OUTPATIENT
Start: 2018-03-05 | End: 2018-03-27 | Stop reason: ALTCHOICE

## 2018-03-05 RX ORDER — ATORVASTATIN CALCIUM 20 MG/1
20 TABLET, FILM COATED ORAL DAILY
Qty: 30 TABLET | Refills: 1 | Status: SHIPPED | OUTPATIENT
Start: 2018-03-05 | End: 2018-05-21 | Stop reason: SDUPTHER

## 2018-03-05 RX ORDER — AMLODIPINE BESYLATE 10 MG/1
10 TABLET ORAL DAILY
Qty: 30 TABLET | Refills: 2 | Status: ON HOLD | OUTPATIENT
Start: 2018-03-05 | End: 2018-04-03 | Stop reason: HOSPADM

## 2018-03-05 RX ORDER — GABAPENTIN 600 MG/1
600 TABLET ORAL 3 TIMES DAILY
Qty: 90 TABLET | Refills: 0 | Status: SHIPPED | OUTPATIENT
Start: 2018-03-05 | End: 2018-03-21 | Stop reason: SDUPTHER

## 2018-03-05 NOTE — PATIENT INSTRUCTIONS
Call your pharmacy for medication refills at least 48 hours ahead at [phone] (Monday -Friday, 08:00 AM to 4:00 PM .If you become ill when the clinic is closed, please call [facility]  at [phone] and ask the  to page the AOD between 6:00 AM and 6:00 PM or page the AON between 6:00 PM & 6:00 AM.    The clinic is not able to process [facility] requests for appointments or messages including refill requests. Please call the [address] [place] [address] at [phone] for appointments and messages. Hold Metformin 2 days before and 2 days after CT with contrast of the brain Patient Education        CT Scan of the Head: About This Test  What is it? A CT (computed tomography) scan uses X-rays to make detailed pictures of your body and the structures inside your body. A CT scan of the head can give your doctor information about your eyes, the bones of your face and nose, your inner ear, and your brain. During the test, you will lie on a table that is attached to the Vudu. The CT scanner is a large doughnut-shaped machine. Why is this test done? A CT scan of the head can help find the cause of symptoms that may mean you have a brain injury or bleeding inside your head. It can also find a tumor and damage caused by a stroke and help find the best treatment for the cause of a stroke. How can you prepare for the test?  Talk to your doctor about all your health conditions before the test. For example, tell your doctor if:  · You are or might be pregnant. · You are allergic to any medicines. · You have diabetes. · You take metformin. · You are breastfeeding. · You get nervous in confined spaces. You may need medicine to help you relax. What happens before the test?  · You may have to take off jewelry, glasses, or hearing aids. Wear comfortable, loose-fitting clothes. · You may have contrast material (dye) put into your arm through a tube called an IV.  Contrast material helps doctors see specific organs, blood vessels, and most tumors. What happens during the test?  · You will lie on a table that is attached to the CT scanner. Straps will hold your head still but your face will not be covered. · The table will slide into the round opening of the scanner. The table will move during the scan. The scanner moves inside the doughnut-shaped casing around your body. · You will be asked to hold still during the scan. You may be asked to hold your breath for short periods. · You may be alone in the scanning room, but a technologist will be watching you through a window and talking with you during the test.  What else should you know about the test?  · A CT scan does not hurt. · If a dye is used, you may feel a quick sting or pinch when the IV is started. The dye may make you feel warm and flushed and give you a metallic taste in your mouth. Some people feel sick to their stomach or get a headache. · If you breastfeed and are concerned about whether the dye used in this test is safe, talk to your doctor. Most experts believe that very little dye passes into breast milk and even less is passed on to the baby. But if you prefer, you can store some of your breast milk ahead of time and use it for a day or two after the test.  · There is a small chance ofgetting cancer from some types of CT scans. The risk is higher inchildren, young adults, and people who have many radiation tests. If you are concerned about this risk, talk to your doctor about the benefits and risks of a CT scan andconfirm that the test is needed. How long does the test take? · The test will take about 30 to 60 minutes. Most of this time is spent getting ready for the scan. The actual test only takes a few minutes. What happens after the test?  · You will probably be able to go home right away. · You can go back to your usual activities right away.   · Drink plenty of fluids for 24 hours after the test if dye was used,

## 2018-03-05 NOTE — PROGRESS NOTES
file.    Social History:   TOBACCO:   reports that she has been smoking Cigarettes. She has a 15.00 pack-year smoking history. She has never used smokeless tobacco.  ETOH:   has no alcohol history on file. OCCUPATION:      Allergies:  Sulfa antibiotics    Current Medications:    Prior to Admission medications    Medication Sig Start Date End Date Taking? Authorizing Provider   gabapentin (NEURONTIN) 600 MG tablet Take 1 tablet by mouth 3 times daily for 30 days. 3/5/18 4/4/18 Yes Edi Ventura MD   metFORMIN (GLUCOPHAGE) 500 MG tablet Take 2 tablets by mouth 2 times daily (with meals) 3/5/18  Yes Edi Ventura MD   amLODIPine (NORVASC) 10 MG tablet Take 1 tablet by mouth daily 3/5/18  Yes Edi Ventura MD   atorvastatin (LIPITOR) 20 MG tablet Take 1 tablet by mouth daily 3/5/18  Yes Edi Ventura MD   insulin glargine (LANTUS SOLOSTAR) 100 UNIT/ML injection pen Inject 35 Units into the skin 2 times daily 3/5/18  Yes Edi Ventura MD   omeprazole (PRILOSEC) 20 MG delayed release capsule Take 1 capsule by mouth Daily 3/5/18  Yes Edi Ventura MD   buPROPion [facility] SR) 150 MG extended release tablet Take 1 tablet by mouth 2 times daily 3/5/18  Yes Edi Ventura MD   Gauze Pads & Dressings MISC Please dispense 4x8 guaze, kerlix, and ace 2/23/18  Yes Laure Chambers DPM   ammonium lactate (LAC-HYDRIN) 12 % lotion Apply topically daily. 1/29/18  Yes Laure Chambers DPM   glucose blood VI test strips (ACCU-CHEK BARBIE) strip 1 each by In Vitro route daily As needed. 12/8/17  Yes Hernan Hernandez MD   silver sulfADIAZINE (SILVADENE) 1 % cream Apply 1 Tube topically daily Apply topically daily. 11/17/17  Yes Laure Chambers DPM   Blood Glucose Monitoring Suppl (TRUE METRIX AIR GLUCOSE METER) LASHON 1 Units by Does not apply route 2 times daily 8/16/17  Yes Stephie Wheeler MD   glucose blood VI test strips (TRUE METRIX BLOOD GLUCOSE TEST) strip 1 each by In Vitro route 2 times daily As needed.  8/16/17 Yes Armaan Calderon MD   Lancets MISC 1 each by Does not apply route 2 times daily PHARMACY MAY SUBSTITUTE TO TRUE METRIX LANCETS 8/16/17  Yes Armaan Calderon MD   Accu-Chek Softclix Lancets MISC 1 strip by Does not apply route 2 times daily Check before breakfast and before going to bed 4/20/17  Yes Quintin Pro MD   Blood Glucose Monitoring Suppl (ACCU-CHEK BARBIE PLUS) W/DEVICE KIT 1 kit by Does not apply route 2 times daily 4/20/17  Yes Quintin Pro MD   Insulin Pen Needle 31G X 5 MM MISC 1 each by Does not apply route daily 2/2/17  Yes Quintin Pro MD   Insulin Syringe-Needle U-100 30G X 5/16\" 0.5 ML MISC 1 each by Does not apply route daily 9/1/16  Yes Quintin Pro MD   METHADONE HCL PO Take 146 mg by mouth daily   Yes Historical MD Martha   nicotine polacrilex (NICORETTE) 2 MG gum Take 1 each by mouth as needed for Smoking cessation 8/28/17   Quintin Pro MD   nicotine (NICODERM CQ) 14 MG/24HR Place 1 patch onto the skin every 24 hours 8/21/17   Quintin Pro MD   [facility] MISC 1 patch by Does not apply route as needed (back pain) 9/1/16   Quintin Pro MD       Review of Systems   Neurological: Positive for headaches. Negative for dizziness, sensory change, speech change and focal weakness. As per HPI; otherwise negative on 10 point review    Physical Exam:      Vitals: BP (!) 150/82 (Site: Left Arm, Position: Sitting, Cuff Size: Large Adult)   Pulse 89   Temp 98 °F (36.7 °C) (Oral)   Resp 20   Ht 5' 2\" (1.575 m)   Wt 196 lb 12.8 oz (89.3 kg)   SpO2 94%   BMI 36.00 kg/m²     Body mass index is 36 kg/m².   Wt Readings from Last 3 Encounters:   03/05/18 196 lb 12.8 oz (89.3 kg)   10/23/17 200 lb (90.7 kg)   10/20/17 200 lb (90.7 kg)       Physical Exam     Gen - no acute distress; sitting in chair  Neuro - A&Ox3; no focal deficits on gross exam; moves all extremities voluntarily; foot exam not performed as both feet dressed and in boots  Pulm

## 2018-03-05 NOTE — PROGRESS NOTES
[facility] PROGRESS NOTE      March 5, 2018  Paulo Mahan    Chief Complaint:   Chief Complaint   Patient presents with    Follow-up     HTN    Headache       Constitutional: alert and oriented; cc: headache/points to both sides of head/ that are frequent and intermittent; no headache now. The Podiatrist told her this am that she now may resume her Amitriptyline and Wellbutrin now that the course of antibiotic is completed. Eyes: negative  Ears, nose, mouth, throat, and face: negative  Respiratory: negative  Cardiovascular: states at the Methadone clinic she was told her B/P was high and she should see her PCP; she perceives that her headaches are due to high blood pressure. smoke 1/2-1 ppd cigarettes. not taking Nicorette gum. Gastrointestinal: negative  Genitourinary: negative  Integument/breast: negative  Hematologic/lymphatic: negative  Musculoskeletal: seen also in Podiatry clinic this am for bilateral wound/foot care. chronic low back pain; not using the Heat Wraps. Neurological: negative  Diabetes: Yes  Yes:  Medication compliance:  compliant most of the time  Diabetic diet compliance:  compliant most of the time  Home glucose monitoring:  is performed regularly  Last eye exam: 3 years ago  Acute symptoms hyperglycemia:  none  states highest reading greater than 300 after drinking soda pop. Acute symptoms hypoglycemia:  hunger  lowest reading has been 85.   POC glucose today: not taken today    Pain Assessment:  Pain Present: no  Pain Score: 0  Pain Quality/Description: no headache now    Mobility/Safety/Self-Care:  Steady Gait: No  History of Falls: No   History of a Fall within the last 30 days No  Assistive Device: Straight Cane  Poor Hygiene: No    Psychosocial:   Depression: Yes  If YES,    Does Patient express current thoughts of harming self or others?: No  Anxious: No  Insomnia: Yes  Inappropriate Behavior: No  Alcohol Abuse: no  Substance Abuse: no  Signs of

## 2018-03-05 NOTE — PROGRESS NOTES
(10/2) : 1.0 cmx 1.0cm   (10/16): 0.6 cm x 0.8 cm  (10/30): 1.0cm x 1.0cm   (11/6): 1.0cmx1.0cmx 0.2cm   100%  granular post debridement. No signs of tracking, probing to bone, drainage, or signs of infection. Mild malodor. (12/1/2017): 2.1 x 1.5 x 0.3 cm   -post- debridement 100% granular base with no acute clinical signs of infection present. No tracking or probing. No drainage, no malodor, no increased erythema.   (12/22/17) post debridement 0.3x0.3cm, no tracking, granular base. (1/19/18) post debridement 0.3x0.3cm  (1/29/18) Not debrided   2/9/18 wound closed, hyperkeratosis, debrided surrounding tissue. 2/23/18 post debridement [id number] 0.5cm  3/5/18 post debridement 1.5x1.5x 0.5cm with hyperkeratotic rim, debrided surrounding tissue. RIGHT:   Full thickness ulcer on the plantar 5th MPJ measuring:   (7/14): 0.9 x 0.7 x 0.1 cm   (7/21): 1 x 0.8 x  0.1 cm  (7/28): 0.7 cm x 0.5 cm x 0.1 cm   (8/11): 0.7 cm x 0.8 cm x 0.2 cm   (8/18): 1 cm x 1 cm x 0.1 cm  (8/25): 1 cm x 0.8 cm x 0.2 cm   (9/1): 1 cm x 1 cm x 0.2  (9/22) 1.2cm x 1.2cm   (10/2): 1.0cm x1.0cm x0.2cm   (10/16): 0.7 cm x 0.7 cm x0.4cm    (11/06): 1.0cmx1.0cmx0.4cm with drainage, pain and local erythema. (11/17): 1.0cm x 1.2 cm x 0.1cm   (12/1/17): 0.7 x 0.6 x 0.1 cm  (12/22/17) post debridement 1.0x1.0cm with pain and local erythema, no drainage.   (1/19/18) post debridement 1.0x1.0cm  (1/29/18) grossly unchanged.   (2/9/18) 0.7x0.7cm very shallow with granular base, no periwound erythema. 2/23/18 post debridement 0.7cmx0.7cm granular base no periwound erythema. 3/5/18 post debridement 0.7cmx0.7cm granular base and hyperkeratotic rim, debrided surrounding tissue. no periwound erythema. MUSCULOSKELETAL: Deferred due to patient status. ASSESSMENT  1. Cellulitis B/L LE   2. Full thickness ulceration, Carpenter grade II left foot  3. Full thickness ulceration, Carpenter grade II right foot  6. Diabetes mellitus, uncontrolled.    8.

## 2018-03-21 ENCOUNTER — OFFICE VISIT (OUTPATIENT)
Dept: INTERNAL MEDICINE | Age: 53
End: 2018-03-21
Attending: STUDENT IN AN ORGANIZED HEALTH CARE EDUCATION/TRAINING PROGRAM

## 2018-03-21 VITALS
RESPIRATION RATE: 20 BRPM | OXYGEN SATURATION: 90 % | BODY MASS INDEX: 39.32 KG/M2 | WEIGHT: 215 LBS | TEMPERATURE: 98.8 F | SYSTOLIC BLOOD PRESSURE: 167 MMHG | HEART RATE: 112 BPM | DIASTOLIC BLOOD PRESSURE: 67 MMHG

## 2018-03-21 DIAGNOSIS — R23.4 CHANGES IN SKIN TEXTURE: Primary | ICD-10-CM

## 2018-03-21 DIAGNOSIS — E11.42 DIABETIC POLYNEUROPATHY ASSOCIATED WITH TYPE 2 DIABETES MELLITUS (HCC): Chronic | ICD-10-CM

## 2018-03-21 LAB
ANION GAP SERPL CALCULATED.3IONS-SCNC: 9 MMOL/L (ref 3–16)
BUN BLDV-MCNC: 10 MG/DL (ref 7–20)
C-REACTIVE PROTEIN: 87 MG/L (ref 0–5.1)
CALCIUM SERPL-MCNC: 9.2 MG/DL (ref 8.3–10.6)
CHLORIDE BLD-SCNC: 95 MMOL/L (ref 99–110)
CO2: 33 MMOL/L (ref 21–32)
CREAT SERPL-MCNC: 1 MG/DL (ref 0.6–1.1)
GFR AFRICAN AMERICAN: >60
GFR NON-AFRICAN AMERICAN: 58
GLUCOSE BLD-MCNC: 285 MG/DL (ref 70–99)
GLUCOSE BLD-MCNC: 307 MG/DL (ref 70–99)
PERFORMED ON: ABNORMAL
POTASSIUM SERPL-SCNC: 4.2 MMOL/L (ref 3.5–5.1)
SEDIMENTATION RATE, ERYTHROCYTE: 56 MM/HR (ref 0–30)
SODIUM BLD-SCNC: 137 MMOL/L (ref 136–145)
TOTAL CK: 55 U/L (ref 26–192)
TSH REFLEX: 2.92 UIU/ML (ref 0.27–4.2)
VITAMIN B-12: 421 PG/ML (ref 211–911)

## 2018-03-21 RX ORDER — GABAPENTIN 800 MG/1
800 TABLET ORAL 3 TIMES DAILY
Qty: 90 TABLET | Refills: 0 | Status: SHIPPED | OUTPATIENT
Start: 2018-03-21 | End: 2018-04-16 | Stop reason: SDUPTHER

## 2018-03-22 NOTE — PROGRESS NOTES
Department of Internal Medicine  OUTPATIENT CLINIC  Medical Resident Progress Note    Date: 03/22/18                                               Subjective:     45 y.o.  female presents to clinic today for worsening chronic leg pain and L sided skin changes. Patient reports that the neuropathy she feels in her legs has been getting worse, and that she's developing skin changes w skin hardening and feeling stiffer w no discoloration over the back of her L calf up to her L lower back and over some parts of her abdomen. She reports it's not just in areas where she usually sticks herself w insulin. She reports her skin hurts most when she's moving, does not hurt when she's still. She recently completed 2 month course of abx for MRSA cellulitis, reports her R leg feels better w healing cellulitis. Objective:     Review of Systems  ROS negative except as described in HPI. Vitals:  BP (!) 167/67 (Site: Right Arm, Position: Sitting, Cuff Size: Medium Adult)   Pulse 112   Temp 98.8 °F (37.1 °C) (Oral)   Resp 20   Wt 215 lb (97.5 kg)   SpO2 90%   BMI 39.32 kg/m²       PHYSICAL EXAM:  Physical Exam   Constitutional: She is oriented to person, place, and time. She appears well-developed and well-nourished. No distress. obese   HENT:   Head: Normocephalic and atraumatic. Mouth/Throat: Oropharynx is clear and moist. No oropharyngeal exudate. Eyes: Conjunctivae and EOM are normal. Pupils are equal, round, and reactive to light. No scleral icterus. Neck: Neck supple. No JVD present. No tracheal deviation present. Cardiovascular: Normal rate, regular rhythm, normal heart sounds and intact distal pulses. Pulmonary/Chest: Effort normal and breath sounds normal. No respiratory distress. She has no wheezes. She has no rales. She exhibits no tenderness. Abdominal: Bowel sounds are normal. She exhibits distension. She exhibits no mass. There is no tenderness. There is no rebound and no guarding.

## 2018-03-23 ENCOUNTER — OFFICE VISIT (OUTPATIENT)
Dept: INTERNAL MEDICINE | Age: 53
End: 2018-03-23
Attending: PODIATRIST

## 2018-03-23 DIAGNOSIS — S91.301D OPEN WOUND OF RIGHT FOOT, SUBSEQUENT ENCOUNTER: Primary | ICD-10-CM

## 2018-03-23 DIAGNOSIS — S91.302D OPEN WOUND OF LEFT FOOT WITH COMPLICATION, SUBSEQUENT ENCOUNTER: ICD-10-CM

## 2018-03-23 PROBLEM — S91.302A OPEN WOUND OF LEFT FOOT WITH COMPLICATION: Status: ACTIVE | Noted: 2018-03-23

## 2018-03-27 PROBLEM — E11.628 DIABETIC FOOT INFECTION (HCC): Status: ACTIVE | Noted: 2018-03-27

## 2018-03-27 PROBLEM — M86.372 CHRONIC MULTIFOCAL OSTEOMYELITIS OF LEFT FOOT (HCC): Status: ACTIVE | Noted: 2018-03-27

## 2018-03-27 PROBLEM — R60.0 BILATERAL LOWER EXTREMITY EDEMA: Status: ACTIVE | Noted: 2018-03-27

## 2018-03-27 PROBLEM — L03.90 CELLULITIS: Status: ACTIVE | Noted: 2018-03-27

## 2018-03-27 PROBLEM — L08.9 DIABETIC FOOT INFECTION (HCC): Status: ACTIVE | Noted: 2018-03-27

## 2018-04-13 ENCOUNTER — OFFICE VISIT (OUTPATIENT)
Dept: INTERNAL MEDICINE | Age: 53
End: 2018-04-13
Attending: PODIATRIST

## 2018-04-13 DIAGNOSIS — M86.372 CHRONIC MULTIFOCAL OSTEOMYELITIS OF LEFT FOOT (HCC): Primary | ICD-10-CM

## 2018-04-13 DIAGNOSIS — S91.302D OPEN WOUND OF LEFT FOOT WITH COMPLICATION, SUBSEQUENT ENCOUNTER: ICD-10-CM

## 2018-04-16 ENCOUNTER — OFFICE VISIT (OUTPATIENT)
Dept: INTERNAL MEDICINE | Age: 53
End: 2018-04-16
Attending: INTERNAL MEDICINE

## 2018-04-16 VITALS
SYSTOLIC BLOOD PRESSURE: 153 MMHG | BODY MASS INDEX: 33.47 KG/M2 | TEMPERATURE: 97.7 F | HEART RATE: 83 BPM | DIASTOLIC BLOOD PRESSURE: 88 MMHG | WEIGHT: 183 LBS | OXYGEN SATURATION: 94 %

## 2018-04-16 DIAGNOSIS — F11.20 OPIOID DEPENDENCE ON AGONIST THERAPY (HCC): ICD-10-CM

## 2018-04-16 DIAGNOSIS — M54.50 CHRONIC BILATERAL LOW BACK PAIN WITHOUT SCIATICA: ICD-10-CM

## 2018-04-16 DIAGNOSIS — I50.32 CHRONIC DIASTOLIC HEART FAILURE (HCC): ICD-10-CM

## 2018-04-16 DIAGNOSIS — G89.29 CHRONIC BILATERAL LOW BACK PAIN WITHOUT SCIATICA: ICD-10-CM

## 2018-04-16 DIAGNOSIS — E11.42 DIABETIC POLYNEUROPATHY ASSOCIATED WITH TYPE 2 DIABETES MELLITUS (HCC): Primary | ICD-10-CM

## 2018-04-16 DIAGNOSIS — Z72.0 TOBACCO ABUSE: ICD-10-CM

## 2018-04-16 DIAGNOSIS — K52.1 DRUG-INDUCED DIARRHEA: ICD-10-CM

## 2018-04-16 DIAGNOSIS — R09.02 HYPOXIA: ICD-10-CM

## 2018-04-16 DIAGNOSIS — I10 ESSENTIAL HYPERTENSION: ICD-10-CM

## 2018-04-16 DIAGNOSIS — I82.4Z1 DVT, LOWER EXTREMITY, DISTAL, ACUTE, RIGHT (HCC): ICD-10-CM

## 2018-04-16 LAB — ALBUMIN SERPL-MCNC: 4.1 G/DL (ref 3.4–5)

## 2018-04-16 RX ORDER — GABAPENTIN 800 MG/1
800 TABLET ORAL 4 TIMES DAILY
Qty: 90 TABLET | Refills: 2 | Status: SHIPPED | OUTPATIENT
Start: 2018-04-16 | End: 2018-04-23 | Stop reason: DRUGHIGH

## 2018-04-16 ASSESSMENT — ENCOUNTER SYMPTOMS
NAUSEA: 0
HEARTBURN: 0
SHORTNESS OF BREATH: 0
SORE THROAT: 0
BACK PAIN: 1
SINUS PAIN: 0
COUGH: 0
ABDOMINAL PAIN: 0
DOUBLE VISION: 0
BLOOD IN STOOL: 0
SPUTUM PRODUCTION: 0
BLURRED VISION: 0
WHEEZING: 0
CONSTIPATION: 0
DIARRHEA: 1
ORTHOPNEA: 0
VOMITING: 0
HEMOPTYSIS: 0

## 2018-04-23 ENCOUNTER — TELEPHONE (OUTPATIENT)
Dept: INTERNAL MEDICINE | Age: 53
End: 2018-04-23

## 2018-04-23 ENCOUNTER — OFFICE VISIT (OUTPATIENT)
Dept: INTERNAL MEDICINE | Age: 53
End: 2018-04-23

## 2018-04-23 ENCOUNTER — OFFICE VISIT (OUTPATIENT)
Dept: INTERNAL MEDICINE | Age: 53
End: 2018-04-23
Attending: INTERNAL MEDICINE

## 2018-04-23 ENCOUNTER — HOSPITAL ENCOUNTER (OUTPATIENT)
Dept: OTHER | Age: 53
Discharge: HOME | End: 2018-04-23
Attending: STUDENT IN AN ORGANIZED HEALTH CARE EDUCATION/TRAINING PROGRAM | Admitting: STUDENT IN AN ORGANIZED HEALTH CARE EDUCATION/TRAINING PROGRAM

## 2018-04-23 ENCOUNTER — HOSPITAL ENCOUNTER (OUTPATIENT)
Dept: VASCULAR LAB | Age: 53
Discharge: HOME | End: 2018-04-23
Attending: INTERNAL MEDICINE | Admitting: INTERNAL MEDICINE

## 2018-04-23 VITALS
WEIGHT: 181 LBS | RESPIRATION RATE: 20 BRPM | DIASTOLIC BLOOD PRESSURE: 74 MMHG | SYSTOLIC BLOOD PRESSURE: 154 MMHG | TEMPERATURE: 98.2 F | HEART RATE: 91 BPM | HEIGHT: 62 IN | BODY MASS INDEX: 33.31 KG/M2 | OXYGEN SATURATION: 94 %

## 2018-04-23 DIAGNOSIS — I50.32 CHRONIC DIASTOLIC HEART FAILURE (HCC): ICD-10-CM

## 2018-04-23 DIAGNOSIS — K52.1 DRUG-INDUCED DIARRHEA: ICD-10-CM

## 2018-04-23 DIAGNOSIS — I82.4Z1 ACUTE EMBOLISM AND THROMBOSIS OF DEEP VEIN OF RIGHT DISTAL LOWER EXTREMITY (HCC): ICD-10-CM

## 2018-04-23 DIAGNOSIS — R09.02 HYPOXIA: ICD-10-CM

## 2018-04-23 DIAGNOSIS — I82.4Z1 DVT, LOWER EXTREMITY, DISTAL, ACUTE, RIGHT (HCC): ICD-10-CM

## 2018-04-23 DIAGNOSIS — F11.20 OPIOID DEPENDENCE ON AGONIST THERAPY (HCC): ICD-10-CM

## 2018-04-23 DIAGNOSIS — S81.801D NON-HEALING WOUND OF LOWER EXTREMITY, RIGHT, SUBSEQUENT ENCOUNTER: Primary | ICD-10-CM

## 2018-04-23 DIAGNOSIS — M54.50 CHRONIC BILATERAL LOW BACK PAIN WITHOUT SCIATICA: ICD-10-CM

## 2018-04-23 DIAGNOSIS — I10 ESSENTIAL HYPERTENSION: ICD-10-CM

## 2018-04-23 DIAGNOSIS — Z79.4 TYPE 2 DIABETES MELLITUS WITH DIABETIC POLYNEUROPATHY, WITH LONG-TERM CURRENT USE OF INSULIN (HCC): ICD-10-CM

## 2018-04-23 DIAGNOSIS — Z72.0 TOBACCO ABUSE: ICD-10-CM

## 2018-04-23 DIAGNOSIS — E11.621 DIABETIC ULCER OF LEFT HEEL ASSOCIATED WITH TYPE 2 DIABETES MELLITUS, WITH FAT LAYER EXPOSED (HCC): ICD-10-CM

## 2018-04-23 DIAGNOSIS — E11.42 DIABETIC POLYNEUROPATHY ASSOCIATED WITH TYPE 2 DIABETES MELLITUS (HCC): Primary | ICD-10-CM

## 2018-04-23 DIAGNOSIS — R23.4 CHANGES IN SKIN TEXTURE: ICD-10-CM

## 2018-04-23 DIAGNOSIS — S91.301S OPEN WOUND OF RIGHT FOOT, SEQUELA: ICD-10-CM

## 2018-04-23 DIAGNOSIS — L97.422 DIABETIC ULCER OF LEFT HEEL ASSOCIATED WITH TYPE 2 DIABETES MELLITUS, WITH FAT LAYER EXPOSED (HCC): ICD-10-CM

## 2018-04-23 DIAGNOSIS — Z01.818 PREOP GENERAL PHYSICAL EXAM: ICD-10-CM

## 2018-04-23 DIAGNOSIS — E11.42 TYPE 2 DIABETES MELLITUS WITH DIABETIC POLYNEUROPATHY, WITH LONG-TERM CURRENT USE OF INSULIN (HCC): ICD-10-CM

## 2018-04-23 DIAGNOSIS — G89.29 CHRONIC BILATERAL LOW BACK PAIN WITHOUT SCIATICA: ICD-10-CM

## 2018-04-23 LAB
ALBUMIN SERPL-MCNC: 4 G/DL (ref 3.4–5)
ANION GAP SERPL CALCULATED.3IONS-SCNC: 12 MMOL/L (ref 3–16)
APTT: 28.1 SEC (ref 24.1–34.9)
BASOPHILS ABSOLUTE: 0 K/UL (ref 0–0.2)
BASOPHILS RELATIVE PERCENT: 0.4 %
BUN BLDV-MCNC: 16 MG/DL (ref 7–20)
CALCIUM SERPL-MCNC: 9.9 MG/DL (ref 8.3–10.6)
CHLORIDE BLD-SCNC: 87 MMOL/L (ref 99–110)
CO2: 36 MMOL/L (ref 21–32)
CREAT SERPL-MCNC: 1.2 MG/DL (ref 0.6–1.1)
EOSINOPHILS ABSOLUTE: 0.2 K/UL (ref 0–0.6)
EOSINOPHILS RELATIVE PERCENT: 3.1 %
GFR AFRICAN AMERICAN: 56
GFR NON-AFRICAN AMERICAN: 47
GLUCOSE BLD-MCNC: 648 MG/DL (ref 70–99)
HCT VFR BLD CALC: 39.1 % (ref 36–48)
HEMOGLOBIN: 12.4 G/DL (ref 12–16)
INR BLD: 0.94 (ref 0.85–1.15)
LYMPHOCYTES ABSOLUTE: 1.5 K/UL (ref 1–5.1)
LYMPHOCYTES RELATIVE PERCENT: 20.2 %
MCH RBC QN AUTO: 27.9 PG (ref 26–34)
MCHC RBC AUTO-ENTMCNC: 31.7 G/DL (ref 31–36)
MCV RBC AUTO: 88 FL (ref 80–100)
MONOCYTES ABSOLUTE: 0.4 K/UL (ref 0–1.3)
MONOCYTES RELATIVE PERCENT: 4.8 %
NEUTROPHILS ABSOLUTE: 5.3 K/UL (ref 1.7–7.7)
NEUTROPHILS RELATIVE PERCENT: 71.5 %
PDW BLD-RTO: 19.7 % (ref 12.4–15.4)
PHOSPHORUS: 3.5 MG/DL (ref 2.5–4.9)
PLATELET # BLD: 260 K/UL (ref 135–450)
PMV BLD AUTO: 10 FL (ref 5–10.5)
POTASSIUM SERPL-SCNC: 4 MMOL/L (ref 3.5–5.1)
PROTHROMBIN TIME: 10.6 SEC (ref 9.6–13)
RBC # BLD: 4.45 M/UL (ref 4–5.2)
SEDIMENTATION RATE, ERYTHROCYTE: 79 MM/HR (ref 0–30)
SODIUM BLD-SCNC: 135 MMOL/L (ref 136–145)
WBC # BLD: 7.5 K/UL (ref 4–11)

## 2018-04-23 RX ORDER — GABAPENTIN 800 MG/1
800 TABLET ORAL 4 TIMES DAILY
Qty: 120 TABLET | Refills: 2 | Status: SHIPPED | OUTPATIENT
Start: 2018-04-23 | End: 2018-06-22 | Stop reason: SDUPTHER

## 2018-04-23 ASSESSMENT — ENCOUNTER SYMPTOMS
DIARRHEA: 0
VOMITING: 0
CONSTIPATION: 0
SPUTUM PRODUCTION: 0
ABDOMINAL PAIN: 0
HEARTBURN: 0
WHEEZING: 0
NAUSEA: 0
SHORTNESS OF BREATH: 0
SORE THROAT: 0
BLOOD IN STOOL: 0
HEMOPTYSIS: 0
COUGH: 0
DOUBLE VISION: 0
ORTHOPNEA: 0
BACK PAIN: 1
SINUS PAIN: 0
BLURRED VISION: 0

## 2018-04-24 ENCOUNTER — TELEPHONE (OUTPATIENT)
Dept: INTERNAL MEDICINE | Age: 53
End: 2018-04-24

## 2018-04-24 LAB — C-REACTIVE PROTEIN: 19 MG/L (ref 0–5.1)

## 2018-04-25 ASSESSMENT — PAIN - FUNCTIONAL ASSESSMENT: PAIN_FUNCTIONAL_ASSESSMENT: 0-10

## 2018-04-25 ASSESSMENT — PAIN SCALES - GENERAL: PAINLEVEL_OUTOF10: 6

## 2018-04-25 ASSESSMENT — PAIN DESCRIPTION - LOCATION: LOCATION: FOOT

## 2018-04-25 ASSESSMENT — PAIN DESCRIPTION - FREQUENCY: FREQUENCY: CONTINUOUS

## 2018-04-25 ASSESSMENT — PAIN DESCRIPTION - ORIENTATION: ORIENTATION: RIGHT

## 2018-04-25 ASSESSMENT — PAIN DESCRIPTION - PAIN TYPE: TYPE: CHRONIC PAIN

## 2018-04-25 ASSESSMENT — PAIN DESCRIPTION - DESCRIPTORS: DESCRIPTORS: CONSTANT

## 2018-04-26 ENCOUNTER — HOSPITAL ENCOUNTER (OUTPATIENT)
Dept: SURGERY | Age: 53
Discharge: HOME | End: 2018-04-26
Attending: PODIATRIST | Admitting: PODIATRIST

## 2018-04-26 VITALS
SYSTOLIC BLOOD PRESSURE: 161 MMHG | BODY MASS INDEX: 33.31 KG/M2 | DIASTOLIC BLOOD PRESSURE: 80 MMHG | RESPIRATION RATE: 19 BRPM | HEART RATE: 79 BPM | OXYGEN SATURATION: 99 % | WEIGHT: 181 LBS | TEMPERATURE: 97.3 F | HEIGHT: 62 IN

## 2018-04-26 DIAGNOSIS — S91.301S OPEN WOUND OF RIGHT FOOT, SEQUELA: Primary | ICD-10-CM

## 2018-04-26 LAB
GLUCOSE BLD-MCNC: 125 MG/DL (ref 70–99)
GLUCOSE BLD-MCNC: 172 MG/DL (ref 70–99)
GLUCOSE BLD-MCNC: 61 MG/DL (ref 70–99)
GLUCOSE BLD-MCNC: 65 MG/DL (ref 70–99)
GLUCOSE BLD-MCNC: 69 MG/DL (ref 70–99)
PERFORMED ON: ABNORMAL

## 2018-04-26 RX ORDER — FENTANYL CITRATE 50 UG/ML
100 INJECTION, SOLUTION INTRAMUSCULAR; INTRAVENOUS ONCE
Status: DISCONTINUED | OUTPATIENT
Start: 2018-04-26 | End: 2018-04-27 | Stop reason: HOSPADM

## 2018-04-26 RX ORDER — DEXTROSE MONOHYDRATE 25 G/50ML
12.5 INJECTION, SOLUTION INTRAVENOUS ONCE
Status: COMPLETED | OUTPATIENT
Start: 2018-04-26 | End: 2018-04-26

## 2018-04-26 RX ORDER — ONDANSETRON 2 MG/ML
4 INJECTION INTRAMUSCULAR; INTRAVENOUS
Status: ACTIVE | OUTPATIENT
Start: 2018-04-26 | End: 2018-04-26

## 2018-04-26 RX ORDER — FENTANYL CITRATE 50 UG/ML
25 INJECTION, SOLUTION INTRAMUSCULAR; INTRAVENOUS EVERY 5 MIN PRN
Status: DISCONTINUED | OUTPATIENT
Start: 2018-04-26 | End: 2018-04-27 | Stop reason: HOSPADM

## 2018-04-26 RX ORDER — OXYCODONE HYDROCHLORIDE AND ACETAMINOPHEN 5; 325 MG/1; MG/1
1 TABLET ORAL
Status: ACTIVE | OUTPATIENT
Start: 2018-04-26 | End: 2018-04-26

## 2018-04-26 RX ORDER — HYDRALAZINE HYDROCHLORIDE 20 MG/ML
5 INJECTION INTRAMUSCULAR; INTRAVENOUS EVERY 10 MIN PRN
Status: DISCONTINUED | OUTPATIENT
Start: 2018-04-26 | End: 2018-04-27 | Stop reason: HOSPADM

## 2018-04-26 RX ORDER — FENTANYL CITRATE 50 UG/ML
50 INJECTION, SOLUTION INTRAMUSCULAR; INTRAVENOUS EVERY 5 MIN PRN
Status: DISCONTINUED | OUTPATIENT
Start: 2018-04-26 | End: 2018-04-27 | Stop reason: HOSPADM

## 2018-04-26 RX ORDER — HYDROMORPHONE HCL 110MG/55ML
0.5 PATIENT CONTROLLED ANALGESIA SYRINGE INTRAVENOUS EVERY 5 MIN PRN
Status: DISCONTINUED | OUTPATIENT
Start: 2018-04-26 | End: 2018-04-27 | Stop reason: HOSPADM

## 2018-04-26 RX ORDER — SODIUM CHLORIDE 0.9 % (FLUSH) 0.9 %
10 SYRINGE (ML) INJECTION EVERY 12 HOURS SCHEDULED
Status: DISCONTINUED | OUTPATIENT
Start: 2018-04-26 | End: 2018-04-27 | Stop reason: HOSPADM

## 2018-04-26 RX ORDER — LABETALOL HYDROCHLORIDE 5 MG/ML
5 INJECTION, SOLUTION INTRAVENOUS EVERY 10 MIN PRN
Status: DISCONTINUED | OUTPATIENT
Start: 2018-04-26 | End: 2018-04-27 | Stop reason: HOSPADM

## 2018-04-26 RX ORDER — ROPIVACAINE HYDROCHLORIDE 5 MG/ML
30 INJECTION, SOLUTION EPIDURAL; INFILTRATION; PERINEURAL ONCE
Status: DISCONTINUED | OUTPATIENT
Start: 2018-04-26 | End: 2018-04-27 | Stop reason: HOSPADM

## 2018-04-26 RX ORDER — TRAMADOL HYDROCHLORIDE 50 MG/1
50 TABLET ORAL EVERY 6 HOURS PRN
Qty: 12 TABLET | Refills: 0 | Status: SHIPPED | OUTPATIENT
Start: 2018-04-26 | End: 2018-04-29

## 2018-04-26 RX ORDER — MIDAZOLAM HYDROCHLORIDE 1 MG/ML
2 INJECTION INTRAMUSCULAR; INTRAVENOUS ONCE
Status: COMPLETED | OUTPATIENT
Start: 2018-04-26 | End: 2018-04-26

## 2018-04-26 RX ORDER — HYDROMORPHONE HCL 110MG/55ML
0.25 PATIENT CONTROLLED ANALGESIA SYRINGE INTRAVENOUS EVERY 5 MIN PRN
Status: DISCONTINUED | OUTPATIENT
Start: 2018-04-26 | End: 2018-04-27 | Stop reason: HOSPADM

## 2018-04-26 RX ORDER — SODIUM CHLORIDE, SODIUM LACTATE, POTASSIUM CHLORIDE, CALCIUM CHLORIDE 600; 310; 30; 20 MG/100ML; MG/100ML; MG/100ML; MG/100ML
INJECTION, SOLUTION INTRAVENOUS CONTINUOUS
Status: DISCONTINUED | OUTPATIENT
Start: 2018-04-26 | End: 2018-04-27 | Stop reason: HOSPADM

## 2018-04-26 RX ORDER — PROMETHAZINE HYDROCHLORIDE 25 MG/ML
6.25 INJECTION, SOLUTION INTRAMUSCULAR; INTRAVENOUS
Status: ACTIVE | OUTPATIENT
Start: 2018-04-26 | End: 2018-04-26

## 2018-04-26 RX ORDER — DEXTROSE MONOHYDRATE 25 G/50ML
INJECTION, SOLUTION INTRAVENOUS
Status: DISPENSED
Start: 2018-04-26 | End: 2018-04-26

## 2018-04-26 RX ORDER — SODIUM CHLORIDE 0.9 % (FLUSH) 0.9 %
10 SYRINGE (ML) INJECTION PRN
Status: DISCONTINUED | OUTPATIENT
Start: 2018-04-26 | End: 2018-04-27 | Stop reason: HOSPADM

## 2018-04-26 RX ORDER — LIDOCAINE HYDROCHLORIDE 10 MG/ML
1 INJECTION, SOLUTION EPIDURAL; INFILTRATION; INTRACAUDAL; PERINEURAL
Status: ACTIVE | OUTPATIENT
Start: 2018-04-26 | End: 2018-04-26

## 2018-04-26 RX ADMIN — DEXTROSE MONOHYDRATE 12.5 G: 25 INJECTION, SOLUTION INTRAVENOUS at 13:17

## 2018-04-26 RX ADMIN — SODIUM CHLORIDE, SODIUM LACTATE, POTASSIUM CHLORIDE, CALCIUM CHLORIDE: 600; 310; 30; 20 INJECTION, SOLUTION INTRAVENOUS at 11:07

## 2018-04-26 RX ADMIN — DEXTROSE MONOHYDRATE 12.5 G: 25 INJECTION, SOLUTION INTRAVENOUS at 11:26

## 2018-04-26 RX ADMIN — MIDAZOLAM HYDROCHLORIDE 2 MG: 1 INJECTION INTRAMUSCULAR; INTRAVENOUS at 12:59

## 2018-04-26 ASSESSMENT — PAIN SCALES - GENERAL
PAINLEVEL_OUTOF10: 0

## 2018-04-30 ENCOUNTER — OFFICE VISIT (OUTPATIENT)
Dept: INTERNAL MEDICINE | Age: 53
End: 2018-04-30
Attending: PODIATRIST

## 2018-04-30 ENCOUNTER — OFFICE VISIT (OUTPATIENT)
Dept: INTERNAL MEDICINE | Age: 53
End: 2018-04-30
Attending: INTERNAL MEDICINE

## 2018-04-30 VITALS
WEIGHT: 186 LBS | BODY MASS INDEX: 34.23 KG/M2 | TEMPERATURE: 97.7 F | HEART RATE: 92 BPM | SYSTOLIC BLOOD PRESSURE: 172 MMHG | HEIGHT: 62 IN | DIASTOLIC BLOOD PRESSURE: 90 MMHG | OXYGEN SATURATION: 99 % | RESPIRATION RATE: 20 BRPM

## 2018-04-30 DIAGNOSIS — I10 ESSENTIAL HYPERTENSION: ICD-10-CM

## 2018-04-30 DIAGNOSIS — I50.32 CHRONIC DIASTOLIC CHF (CONGESTIVE HEART FAILURE) (HCC): ICD-10-CM

## 2018-04-30 DIAGNOSIS — S91.302S OPEN WOUND OF LEFT FOOT WITH COMPLICATION, SEQUELA: ICD-10-CM

## 2018-04-30 DIAGNOSIS — I82.401 ACUTE DEEP VEIN THROMBOSIS (DVT) OF RIGHT LOWER EXTREMITY, UNSPECIFIED VEIN (HCC): ICD-10-CM

## 2018-04-30 DIAGNOSIS — E08.42 DIABETIC POLYNEUROPATHY ASSOCIATED WITH DIABETES MELLITUS DUE TO UNDERLYING CONDITION (HCC): ICD-10-CM

## 2018-04-30 DIAGNOSIS — F11.20 OPIOID DEPENDENCE, DAILY USE (HCC): ICD-10-CM

## 2018-04-30 DIAGNOSIS — E11.42 DIABETIC POLYNEUROPATHY ASSOCIATED WITH TYPE 2 DIABETES MELLITUS (HCC): Primary | ICD-10-CM

## 2018-04-30 DIAGNOSIS — S91.301D OPEN WOUND OF RIGHT FOOT, SUBSEQUENT ENCOUNTER: Primary | ICD-10-CM

## 2018-04-30 LAB
GLUCOSE BLD-MCNC: 461 MG/DL (ref 70–99)
PERFORMED ON: ABNORMAL

## 2018-04-30 RX ORDER — CEFUROXIME AXETIL 500 MG/1
500 TABLET ORAL 2 TIMES DAILY
Qty: 20 TABLET | Refills: 0 | Status: SHIPPED | OUTPATIENT
Start: 2018-04-30 | End: 2018-05-10

## 2018-04-30 ASSESSMENT — ENCOUNTER SYMPTOMS
SHORTNESS OF BREATH: 0
SPUTUM PRODUCTION: 0
ABDOMINAL PAIN: 0
VOMITING: 0
BLURRED VISION: 0
NAUSEA: 0
CONSTIPATION: 0
ORTHOPNEA: 0
DIARRHEA: 0
COUGH: 0

## 2018-05-01 LAB
ANAEROBIC CULTURE: ABNORMAL
GRAM STAIN RESULT: ABNORMAL
ORGANISM: ABNORMAL
ORGANISM: ABNORMAL
WOUND/ABSCESS: ABNORMAL
WOUND/ABSCESS: ABNORMAL

## 2018-05-11 ENCOUNTER — OFFICE VISIT (OUTPATIENT)
Dept: INTERNAL MEDICINE | Age: 53
End: 2018-05-11
Attending: INTERNAL MEDICINE

## 2018-05-11 ENCOUNTER — OFFICE VISIT (OUTPATIENT)
Dept: INTERNAL MEDICINE | Age: 53
End: 2018-05-11
Attending: PODIATRIST

## 2018-05-11 VITALS
TEMPERATURE: 97.8 F | DIASTOLIC BLOOD PRESSURE: 71 MMHG | HEART RATE: 91 BPM | OXYGEN SATURATION: 96 % | RESPIRATION RATE: 16 BRPM | SYSTOLIC BLOOD PRESSURE: 151 MMHG

## 2018-05-11 DIAGNOSIS — E66.9 OBESITY (BMI 30-39.9): ICD-10-CM

## 2018-05-11 DIAGNOSIS — L03.90 WOUND CELLULITIS: Primary | ICD-10-CM

## 2018-05-11 DIAGNOSIS — Z12.11 SCREENING FOR COLON CANCER: Primary | ICD-10-CM

## 2018-05-11 DIAGNOSIS — E11.42 DIABETIC POLYNEUROPATHY ASSOCIATED WITH TYPE 2 DIABETES MELLITUS (HCC): ICD-10-CM

## 2018-05-11 DIAGNOSIS — Z79.4 TYPE 2 DIABETES MELLITUS WITH OTHER SPECIFIED COMPLICATION, WITH LONG-TERM CURRENT USE OF INSULIN (HCC): ICD-10-CM

## 2018-05-11 DIAGNOSIS — E11.69 TYPE 2 DIABETES MELLITUS WITH OTHER SPECIFIED COMPLICATION, WITH LONG-TERM CURRENT USE OF INSULIN (HCC): ICD-10-CM

## 2018-05-11 RX ORDER — ASPIRIN 81 MG/1
81 TABLET ORAL DAILY
Qty: 30 TABLET | Refills: 5 | Status: ON HOLD | OUTPATIENT
Start: 2018-05-11 | End: 2018-08-15 | Stop reason: DRUGHIGH

## 2018-05-11 RX ORDER — INSULIN GLARGINE 100 [IU]/ML
50 INJECTION, SOLUTION SUBCUTANEOUS NIGHTLY
Qty: 1 VIAL | Refills: 3 | Status: SHIPPED | OUTPATIENT
Start: 2018-05-11 | End: 2018-06-07 | Stop reason: CLARIF

## 2018-05-11 RX ORDER — CARVEDILOL 6.25 MG/1
6.25 TABLET ORAL 2 TIMES DAILY
Qty: 60 TABLET | Refills: 5 | Status: SHIPPED | OUTPATIENT
Start: 2018-05-11 | End: 2018-10-29 | Stop reason: SDUPTHER

## 2018-05-11 RX ORDER — INSULIN GLARGINE 100 [IU]/ML
35 INJECTION, SOLUTION SUBCUTANEOUS NIGHTLY
COMMUNITY
End: 2018-05-11 | Stop reason: SDUPTHER

## 2018-05-11 RX ORDER — FUROSEMIDE 40 MG/1
40 TABLET ORAL 2 TIMES DAILY
Qty: 60 TABLET | Refills: 3 | Status: SHIPPED | OUTPATIENT
Start: 2018-05-11 | End: 2018-10-05 | Stop reason: SDUPTHER

## 2018-05-11 ASSESSMENT — ENCOUNTER SYMPTOMS
RESPIRATORY NEGATIVE: 1
EYES NEGATIVE: 1
GASTROINTESTINAL NEGATIVE: 1

## 2018-05-18 ENCOUNTER — OFFICE VISIT (OUTPATIENT)
Dept: INTERNAL MEDICINE | Age: 53
End: 2018-05-18
Attending: STUDENT IN AN ORGANIZED HEALTH CARE EDUCATION/TRAINING PROGRAM

## 2018-05-18 ENCOUNTER — OFFICE VISIT (OUTPATIENT)
Dept: INTERNAL MEDICINE | Age: 53
End: 2018-05-18
Attending: PODIATRIST

## 2018-05-18 VITALS
SYSTOLIC BLOOD PRESSURE: 128 MMHG | OXYGEN SATURATION: 98 % | RESPIRATION RATE: 16 BRPM | TEMPERATURE: 98.1 F | HEART RATE: 70 BPM | DIASTOLIC BLOOD PRESSURE: 75 MMHG

## 2018-05-18 DIAGNOSIS — E11.621 DIABETIC ULCER OF LEFT MIDFOOT ASSOCIATED WITH TYPE 2 DIABETES MELLITUS, WITH FAT LAYER EXPOSED (HCC): Primary | ICD-10-CM

## 2018-05-18 DIAGNOSIS — S91.301S OPEN WOUND OF RIGHT FOOT, SEQUELA: ICD-10-CM

## 2018-05-18 DIAGNOSIS — E11.42 TYPE 2 DIABETES MELLITUS WITH DIABETIC POLYNEUROPATHY, UNSPECIFIED LONG TERM INSULIN USE STATUS: Primary | ICD-10-CM

## 2018-05-18 DIAGNOSIS — L97.422 DIABETIC ULCER OF LEFT MIDFOOT ASSOCIATED WITH TYPE 2 DIABETES MELLITUS, WITH FAT LAYER EXPOSED (HCC): Primary | ICD-10-CM

## 2018-05-18 DIAGNOSIS — Q66.89: ICD-10-CM

## 2018-05-18 LAB
GLUCOSE BLD-MCNC: 181 MG/DL (ref 70–99)
PERFORMED ON: ABNORMAL

## 2018-05-21 DIAGNOSIS — E78.5 DYSLIPIDEMIA: ICD-10-CM

## 2018-05-21 RX ORDER — OMEPRAZOLE 20 MG/1
20 CAPSULE, DELAYED RELEASE ORAL DAILY
Qty: 30 CAPSULE | Refills: 2
Start: 2018-05-21 | End: 2018-10-29 | Stop reason: SDUPTHER

## 2018-05-21 RX ORDER — ATORVASTATIN CALCIUM 20 MG/1
20 TABLET, FILM COATED ORAL DAILY
Qty: 30 TABLET | Refills: 3
Start: 2018-05-21 | End: 2018-10-29 | Stop reason: SDUPTHER

## 2018-05-22 ENCOUNTER — OFFICE VISIT (OUTPATIENT)
Dept: INTERNAL MEDICINE | Age: 53
End: 2018-05-22
Attending: PODIATRIST

## 2018-05-22 DIAGNOSIS — M86.372 CHRONIC MULTIFOCAL OSTEOMYELITIS OF LEFT FOOT (HCC): Primary | ICD-10-CM

## 2018-05-22 DIAGNOSIS — L08.9 DIABETIC FOOT INFECTION (HCC): ICD-10-CM

## 2018-05-22 DIAGNOSIS — E11.628 DIABETIC FOOT INFECTION (HCC): ICD-10-CM

## 2018-05-28 LAB
FUNGUS (MYCOLOGY) CULTURE: NORMAL
FUNGUS STAIN: NORMAL

## 2018-06-05 ENCOUNTER — OFFICE VISIT (OUTPATIENT)
Dept: INTERNAL MEDICINE | Age: 53
End: 2018-06-05
Attending: PODIATRIST

## 2018-06-05 DIAGNOSIS — E11.628 DIABETIC FOOT INFECTION (HCC): ICD-10-CM

## 2018-06-05 DIAGNOSIS — S91.301S OPEN WOUND OF RIGHT FOOT, SEQUELA: Primary | ICD-10-CM

## 2018-06-05 DIAGNOSIS — L08.9 DIABETIC FOOT INFECTION (HCC): ICD-10-CM

## 2018-06-05 DIAGNOSIS — E08.42 DIABETIC POLYNEUROPATHY ASSOCIATED WITH DIABETES MELLITUS DUE TO UNDERLYING CONDITION (HCC): ICD-10-CM

## 2018-06-05 RX ORDER — NICOTINE 21 MG/24HR
1 PATCH, TRANSDERMAL 24 HOURS TRANSDERMAL EVERY 24 HOURS
Qty: 30 PATCH | Refills: 2 | Status: ON HOLD | OUTPATIENT
Start: 2018-06-05 | End: 2019-03-25

## 2018-06-12 LAB
AFB CULTURE (MYCOBACTERIA): NORMAL
AFB SMEAR: NORMAL

## 2018-06-22 DIAGNOSIS — E11.42 DIABETIC POLYNEUROPATHY ASSOCIATED WITH TYPE 2 DIABETES MELLITUS (HCC): ICD-10-CM

## 2018-06-22 RX ORDER — GABAPENTIN 800 MG/1
800 TABLET ORAL 4 TIMES DAILY
Qty: 120 TABLET | Refills: 2
Start: 2018-06-22 | End: 2018-08-01 | Stop reason: SDUPTHER

## 2018-06-26 ENCOUNTER — OFFICE VISIT (OUTPATIENT)
Dept: INTERNAL MEDICINE | Age: 53
End: 2018-06-26
Attending: PODIATRIST

## 2018-06-26 DIAGNOSIS — E11.621 DIABETIC ULCER OF LEFT HEEL ASSOCIATED WITH TYPE 2 DIABETES MELLITUS, WITH FAT LAYER EXPOSED (HCC): ICD-10-CM

## 2018-06-26 DIAGNOSIS — L08.9 DIABETIC FOOT INFECTION (HCC): Primary | ICD-10-CM

## 2018-06-26 DIAGNOSIS — E11.628 DIABETIC FOOT INFECTION (HCC): Primary | ICD-10-CM

## 2018-06-26 DIAGNOSIS — L97.422 DIABETIC ULCER OF LEFT HEEL ASSOCIATED WITH TYPE 2 DIABETES MELLITUS, WITH FAT LAYER EXPOSED (HCC): ICD-10-CM

## 2018-06-26 NOTE — PROGRESS NOTES
OUTPATIENT SPECIALTY PODIATRY VISIT      Nursing Progress Note      June 26, 2018  Janelle Chandler    Patient description of the problem: bilateral foot ulcers    Observations:   Cast intact ot left foot. Right foot dressing was intact. Ulcers pink with maceration around the edges . Purulent drainage noted on old dressing.     Ambulates: without assistance    Gait: Normal     Assistive Devices: none    Fall History: No    Foot Hygiene: poor    Foot Wear Proper: Yes    Impaired Skin Integrity: Yes      Pain Assessment:  Pain Present: no  Pain Score: 0/10  Pain Quality/Description:   Pain Onset:   ago  Pain Goal of patient: /10    Education Assessment:    Identify the learner who is being assessed for education:  patient                    Ability to Learn:  Exhibits ability to grasp concepts and respond to questions: Medium  Ready to Learn: Yes  calm   Preferred Method of Learning:  written  Barriers to Learning: Verbalizes interest  Special Considerations due to cultural, Sikhism, spiritual beliefs:  No  Language:  English  :  Lianna Quiles Page  1:12 PM 6/26/2018

## 2018-06-26 NOTE — PROGRESS NOTES
.  Department of Podiatry  Resident Progress Note    Magdi Garrett  Allergies: Sulfa antibiotics    SUBJECTIVE  The patient is a 45 y.o. female who presents to clinic today for follow up of a bilateral foot wound. Patient in total contact cast to Left foot, postop shoe to right. Denies any new acute events. Denies f/c/n/v/cp. Past Medical History:         Diagnosis Date    Amenorrhea     Anomalies of nails     Asthma 5/14/04    Athlete's foot 8/2010    Bacterial vaginosis 04/2008    Carpal tunnel syndrome 5/2007    Diabetes mellitus type II 08/2007    Diabetic neuropathy ([facility] [address] 8/10    DVT (deep venous thrombosis) ([facility] [address] 3/2004    Dyslipidemia 5/2009    Dyspareunia 05/2009    ETOH abuse 3/04/2007    Feet clawing     HTN (hypertension)     Hx of blood clots     Hyperlipidemia     MRSA (methicillin resistant staph aureus) culture positive 11/06/2017; 11/17/2017    foot; leg     Neuropathy ([facility] [address] 05/2009    polyneuropathy    Pain, back 04/2008    Pain, eye, right 5/14/04    Pancreatitis 5/14/04    Pseudocyst, pancreas 5/14/04    Scalp lesion 08/2007    Tinea pedis     Tobacco abuse 03/2008    Vaginal bleeding, abnormal 6/2007       REVIEW OF SYSTEMS:  CONSTITUTIONAL:  negative  RESPIRATORY:  negative  CARDIOVASCULAR:  negative  GASTROINTESTINAL:  negative  MUSCULOSKELETAL:  negative  NEUROLOGICAL:  negative    OBJECTIVE  VASCULAR: DP and PT pulses are palpable 1/4 b/l. CFT is brisk to the digits of the foot b/l. Skin temperature is warm to R forefoot, left foot is cool. NEUROLOGIC: Gross and epicritic sensation is intact b/l. DERMATOLOGIC:   LEFT:   Increased skin lines consistent with recent reduction of erythema to entire leg, plantar foot has two wounds, sub 1st MPJ is full thickness with hyperkeratotic rim and some maceration, post debridement is bleeding and granular approximately 2cm in diameter and 0.4cm deep, no purulence, no periwound erythema.  Second wound is sub 4th met

## 2018-07-03 PROBLEM — S91.301A OPEN WOUND OF RIGHT FOOT: Status: ACTIVE | Noted: 2018-07-03

## 2018-07-09 DIAGNOSIS — E11.42 DIABETIC POLYNEUROPATHY ASSOCIATED WITH TYPE 2 DIABETES MELLITUS (HCC): Chronic | ICD-10-CM

## 2018-07-17 ENCOUNTER — OFFICE VISIT (OUTPATIENT)
Dept: INTERNAL MEDICINE CLINIC | Age: 53
End: 2018-07-17
Payer: MEDICARE

## 2018-07-17 DIAGNOSIS — L97.512 DIABETIC ULCER OF TOE OF RIGHT FOOT ASSOCIATED WITH TYPE 2 DIABETES MELLITUS, WITH FAT LAYER EXPOSED (HCC): ICD-10-CM

## 2018-07-17 DIAGNOSIS — L97.522 DIABETIC ULCER OF TOE OF LEFT FOOT ASSOCIATED WITH TYPE 2 DIABETES MELLITUS, WITH FAT LAYER EXPOSED (HCC): Primary | ICD-10-CM

## 2018-07-17 DIAGNOSIS — E11.621 DIABETIC ULCER OF TOE OF LEFT FOOT ASSOCIATED WITH TYPE 2 DIABETES MELLITUS, WITH FAT LAYER EXPOSED (HCC): Primary | ICD-10-CM

## 2018-07-17 DIAGNOSIS — E11.621 DIABETIC ULCER OF TOE OF RIGHT FOOT ASSOCIATED WITH TYPE 2 DIABETES MELLITUS, WITH FAT LAYER EXPOSED (HCC): ICD-10-CM

## 2018-07-17 PROCEDURE — 99213 OFFICE O/P EST LOW 20 MIN: CPT | Performed by: STUDENT IN AN ORGANIZED HEALTH CARE EDUCATION/TRAINING PROGRAM

## 2018-07-17 PROCEDURE — 99215 OFFICE O/P EST HI 40 MIN: CPT | Performed by: STUDENT IN AN ORGANIZED HEALTH CARE EDUCATION/TRAINING PROGRAM

## 2018-07-17 NOTE — PROGRESS NOTES
warm from proximal to distal, b/l. NEUROLOGIC: Gross and protective sensation is diminished b/l. DERMATOLOGIC:     LEFT:   Full thickness wound located to the plantar aspect of the 1st MPJ that is full thickness with hyperkeratotic rim and maceration noted. Wound measures 3.0 x 2.0 x 0.3 cm post-debridement    Previous wound measured: 4.0 x 2.5 x 0.3 cm. RIGHT:   Full thickness wound at base of 5th metatarsal head with fibrotic base, hyperkeratotic rim, with mild serous drainage noted. No periwound erythema. Wound measures 2.0 x 2.0 x 0.3 cm post-debridement. Previous wound measured: 2.5 x 2.5 x 0.4 cm                 MUSCULOSKELETAL: Muscle strength testing deferred. 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 ASSESSMENT  1. Neuropathic ulceration 2/2 DM,  Carpenter grade II- Left foot  2. Neuropathic ulceration 2/2 DM, Carpenter grade II- Right foot. 3. Diabetes mellitus Type II, uncontrolled. 4. Peripheral neuropathy 2/2 Uncontrolled DM. PLAN  1. Patient seen and examined. Etiology and treatment options discussed with patient for roughly 30 minutes. 2. Excisional debridement of B/L wounds using a #15 blade and currette down to subcutaneous layer not including subcutaneous tissue until bleeding base was noted. <25 cm2 was debrided and bleeding stopped with compression. Patient tolerated the procedure without incident. 3. Left foot wound was redressed with tiffany, offloading pad to wound and surrounding area, 4x8 gauze, kerlix, webril, stockinet, felt pad around distal toes, anterior shin and both malleoli. Application of Total Contact Cast- Left   4. Right foot was redressed with tiffany, offloading pad to wound and surrounding area, 4x8 gauze, kerlix, and Ace bandage. 5. Patient was instructed to leave the dressing clean, dry, and intact. 6. Patient was educated on importance of checking her blood sugars daily.  Patient was encouraged to follow up with PCP for getting a new blood sugar monitor. 7. Patient was educated that if her Right leg were to develop signs and symptoms of cellulitis, that she should go to the ED immediately and get evaluated for possible infection. Patient is in understanding with this. 8. Patient will return to clinic in one week for local wound care and dressing change.       Mayi Arnold DPM PGY-1  Pager: [phone]

## 2018-07-23 RX ORDER — LANCETS 30 GAUGE
1 EACH MISCELLANEOUS 2 TIMES DAILY
Qty: 100 EACH | Refills: 3 | Status: SHIPPED | OUTPATIENT
Start: 2018-07-23 | End: 2019-02-19 | Stop reason: SDUPTHER

## 2018-07-24 ENCOUNTER — OFFICE VISIT (OUTPATIENT)
Dept: INTERNAL MEDICINE CLINIC | Age: 53
End: 2018-07-24
Payer: MEDICARE

## 2018-07-24 DIAGNOSIS — E11.621 DIABETIC ULCER OF OTHER PART OF RIGHT FOOT ASSOCIATED WITH TYPE 2 DIABETES MELLITUS, WITH FAT LAYER EXPOSED (HCC): Primary | ICD-10-CM

## 2018-07-24 DIAGNOSIS — L97.522 DIABETIC ULCER OF OTHER PART OF LEFT FOOT ASSOCIATED WITH TYPE 2 DIABETES MELLITUS, WITH FAT LAYER EXPOSED (HCC): ICD-10-CM

## 2018-07-24 DIAGNOSIS — L97.512 DIABETIC ULCER OF OTHER PART OF RIGHT FOOT ASSOCIATED WITH TYPE 2 DIABETES MELLITUS, WITH FAT LAYER EXPOSED (HCC): Primary | ICD-10-CM

## 2018-07-24 DIAGNOSIS — E11.621 DIABETIC ULCER OF OTHER PART OF LEFT FOOT ASSOCIATED WITH TYPE 2 DIABETES MELLITUS, WITH FAT LAYER EXPOSED (HCC): ICD-10-CM

## 2018-07-24 PROCEDURE — 99213 OFFICE O/P EST LOW 20 MIN: CPT | Performed by: STUDENT IN AN ORGANIZED HEALTH CARE EDUCATION/TRAINING PROGRAM

## 2018-07-24 PROCEDURE — 29405 APPL SHORT LEG CAST: CPT

## 2018-07-24 NOTE — PROGRESS NOTES
OUTPATIENT SPECIALTY PODIATRY VISIT      Nursing Progress Note      July 24, 2018  Blane Colmenares    Patient description of the problem: Wound check bilateral feeet    Observations: Cast on left , dressing and ace on right  Ambulates: without assistance    Gait: Abnormal  Cast and surgi-shoe     Assistive Devices: none    Fall History: No    Foot Hygiene: bad    Foot Wear Proper: Yes    Impaired Skin Integrity: Yes wounds on both feet     Pain Assessment:  Pain Present: no    Education Assessment:    Identify the learner who is being assessed for education:  patient                    Ability to Learn:  Exhibits ability to grasp concepts and respond to questions: Medium  Ready to Learn: Yes  calm   Preferred Method of Learning:  written  Barriers to Learning: Verbalizes interest  Special Considerations due to cultural, Advent, spiritual beliefs:  No  Language:  English  :  No    Comments:  Right foot dressing with moderate amount of drainage with bad odor    Og Moss  3:18 PM 7/24/2018

## 2018-07-31 ENCOUNTER — OFFICE VISIT (OUTPATIENT)
Dept: INTERNAL MEDICINE CLINIC | Age: 53
End: 2018-07-31
Payer: MEDICARE

## 2018-07-31 DIAGNOSIS — S91.301D OPEN WOUND OF RIGHT FOOT, SUBSEQUENT ENCOUNTER: ICD-10-CM

## 2018-07-31 DIAGNOSIS — E11.621 DIABETIC ULCER OF OTHER PART OF RIGHT FOOT ASSOCIATED WITH TYPE 2 DIABETES MELLITUS, WITH FAT LAYER EXPOSED (HCC): ICD-10-CM

## 2018-07-31 DIAGNOSIS — S91.302D OPEN WOUND OF LEFT FOOT, SUBSEQUENT ENCOUNTER: Primary | ICD-10-CM

## 2018-07-31 DIAGNOSIS — L97.512 DIABETIC ULCER OF OTHER PART OF RIGHT FOOT ASSOCIATED WITH TYPE 2 DIABETES MELLITUS, WITH FAT LAYER EXPOSED (HCC): ICD-10-CM

## 2018-07-31 PROCEDURE — 29405 APPL SHORT LEG CAST: CPT

## 2018-07-31 PROCEDURE — 99213 OFFICE O/P EST LOW 20 MIN: CPT | Performed by: STUDENT IN AN ORGANIZED HEALTH CARE EDUCATION/TRAINING PROGRAM

## 2018-07-31 NOTE — PATIENT INSTRUCTIONS
Keep cast and dressing on clean and dry. Return in 1 week.  Any problems before visit call the clinic or go to ER  , if cast feels to tight or causes pain , colby or go to ER

## 2018-07-31 NOTE — PROGRESS NOTES
OUTPATIENT SPECIALTY PODIATRY VISIT      Nursing Progress Note      July 31, 2018  Blas Cooley    Patient description of the problem: left leg cast intact. . Would like shorter cast as this one rubs the back of her knee. Dressing to right foot intact. And dry. Observations: here for dressing change. Ambulates: without assistance    Gait: Normal     Assistive Devices: none    Fall History: No    Foot Hygiene: good    Foot Wear Proper: Yes    Impaired Skin Integrity: Yes bilateral foot ulcers.       Pain Assessment:  Pain Present: no  Pain Score: 0/10  Pain Quality/Description:   Pain Onset:   ago  Pain Goal of patient: /10    Education Assessment:    Identify the learner who is being assessed for education:  patient                    Ability to Learn:  Exhibits ability to grasp concepts and respond to questions: Medium  Ready to Learn: Yes  calm   Preferred Method of Learning:  written  Barriers to Learning: Verbalizes interest  Special Considerations due to cultural, Religion, spiritual beliefs:  No  Language:  English  :  No    Solomon Kamara Page  3:04 PM 7/31/2018
(7/17/18). 3.0 x 1.8 x 0.3 cm (7/24/18)    RIGHT:   Full thickness wound at base of 5th metatarsal head with fibrotic base, hyperkeratotic rim, with mild serous drainage noted. No periwound erythema. Wound measures 2.0 x 1.8 x 0.3 cm post-debridement. Previous wound measured:     2.0 x 2.0 x 0.3 cm (7/17/18)    2.0 x 1.6 x 0.2 (7/24/18)                MUSCULOSKELETAL: Muscle strength testing deferred. 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 ASSESSMENT  1. Neuropathic ulceration 2/2 DM,  Carpenter grade II- Left foot  2. Neuropathic ulceration 2/2 DM, Carpenter grade II- Right foot. 3. Diabetes mellitus Type II, uncontrolled. 4. Peripheral neuropathy 2/2 Uncontrolled DM. PLAN  -Patient seen and examined. Etiology and treatment options discussed with patient for roughly 30 minutes. - Nails 1-5 b/l were debrided in length and thickness without incident   -Sharp excisional debridement of B/L wounds using a tissue nipper and currette down to subcutaneous layer not including subcutaneous tissue until bleeding base was noted. <25 cm2 was debrided and bleeding stopped with compression. Patient tolerated the procedure without incident.   -Left foot wound was redressed with tiffany, offloading pad to wound and surrounding area, 4x8 gauze, kerlix, webril, stockinet, felt pad around distal toes, anterior shin and both malleoli. Application of Total Contact Cast- Left   -Right foot was redressed with tiffany, offloading pad to wound and surrounding area, 4x8 gauze, kerlix, and Ace bandage.  -Patient was instructed to leave the dressing clean, dry, and intact. .   -Patient will return to clinic in one week for local wound care and dressing change.       Isaías Lay PGY1  Pager [phone]

## 2018-08-01 DIAGNOSIS — E11.42 DIABETIC POLYNEUROPATHY ASSOCIATED WITH TYPE 2 DIABETES MELLITUS (HCC): ICD-10-CM

## 2018-08-01 RX ORDER — GABAPENTIN 800 MG/1
800 TABLET ORAL 4 TIMES DAILY
Qty: 120 TABLET | Refills: 2
Start: 2018-08-01 | End: 2018-10-29 | Stop reason: SDUPTHER

## 2018-08-06 RX ORDER — AMITRIPTYLINE HYDROCHLORIDE 100 MG/1
100 TABLET, FILM COATED ORAL NIGHTLY
Qty: 30 TABLET | Refills: 1 | Status: SHIPPED | OUTPATIENT
Start: 2018-08-06 | End: 2018-10-29 | Stop reason: SDUPTHER

## 2018-08-07 ENCOUNTER — OFFICE VISIT (OUTPATIENT)
Dept: INTERNAL MEDICINE CLINIC | Age: 53
End: 2018-08-07
Payer: MEDICARE

## 2018-08-07 DIAGNOSIS — L97.512 DIABETIC ULCER OF OTHER PART OF RIGHT FOOT ASSOCIATED WITH TYPE 2 DIABETES MELLITUS, WITH FAT LAYER EXPOSED (HCC): ICD-10-CM

## 2018-08-07 DIAGNOSIS — E11.621 DIABETIC ULCER OF OTHER PART OF LEFT FOOT ASSOCIATED WITH TYPE 2 DIABETES MELLITUS, WITH FAT LAYER EXPOSED (HCC): Primary | ICD-10-CM

## 2018-08-07 DIAGNOSIS — E11.621 DIABETIC ULCER OF OTHER PART OF RIGHT FOOT ASSOCIATED WITH TYPE 2 DIABETES MELLITUS, WITH FAT LAYER EXPOSED (HCC): ICD-10-CM

## 2018-08-07 DIAGNOSIS — L97.522 DIABETIC ULCER OF OTHER PART OF LEFT FOOT ASSOCIATED WITH TYPE 2 DIABETES MELLITUS, WITH FAT LAYER EXPOSED (HCC): Primary | ICD-10-CM

## 2018-08-07 PROCEDURE — 29405 APPL SHORT LEG CAST: CPT

## 2018-08-07 PROCEDURE — 99213 OFFICE O/P EST LOW 20 MIN: CPT | Performed by: STUDENT IN AN ORGANIZED HEALTH CARE EDUCATION/TRAINING PROGRAM

## 2018-08-07 NOTE — PROGRESS NOTES
.  Department of Podiatry  Resident Progress Note    Doni Vaughan  Allergies: Sulfa antibiotics    SUBJECTIVE  The is a 52 y.o. female who presents to clinic today for follow up of bilateral diabetic foot wounds. Patient states that the TCC has done okay this week. Patient does have some concerns with some rubbing on the right foot. Patient states that the dressing to her Right foot has done well this week. Patient presents to clinic today in Total Contact Cast to Left foot, postop shoe to right. Patient denies any other pedal complaints at this time. Patient denies any  N/V/F/C/SOB. Past Medical History:         Diagnosis Date    Amenorrhea     Anomalies of nails     Asthma 5/14/04    Athlete's foot 8/2010    Bacterial vaginosis 04/2008    Carpal tunnel syndrome 5/2007    Diabetes mellitus type II 08/2007    Diabetic neuropathy ([facility] [address] 8/10    DVT (deep venous thrombosis) ([facility] [address] 3/2004    Dyslipidemia 5/2009    Dyspareunia 05/2009    ETOH abuse 3/04/2007    Feet clawing     HTN (hypertension)     Hx of blood clots     Hyperlipidemia     MRSA (methicillin resistant staph aureus) culture positive 11/06/2017; 11/17/2017    foot; leg     Neuropathy 05/2009    polyneuropathy    Pain, back 04/2008    Pain, eye, right 5/14/04    Pancreatitis 5/14/04    Pseudocyst, pancreas 5/14/04    Scalp lesion 08/2007    Tinea pedis     Tobacco abuse 03/2008    Vaginal bleeding, abnormal 6/2007       REVIEW OF SYSTEMS:  See HPI above. OBJECTIVE    VASCULAR: DP and PT pulses are palpable 1/4 b/l. CFT is brisk x 9 digits. Skin temperature is warm to warm from proximal to distal, b/l. NEUROLOGIC: Gross and protective sensation is diminished b/l. DERMATOLOGIC:     LEFT:   Wound located to the plantar aspect of the 1st MPJ that is full thickness with hyperkeratotic rim and maceration noted. Wound measures 2.4 x 1.8 x 0.2 cm post-debridement    Previous wound measured:     3.0 x 2.0 x 0.3 cm (7/17/18).

## 2018-08-14 ENCOUNTER — HOSPITAL ENCOUNTER (INPATIENT)
Age: 53
LOS: 6 days | Discharge: NURSING, REHAB OR LONG-TERM CARE | DRG: 617 | End: 2018-08-20
Attending: PODIATRIST | Admitting: PODIATRIST
Payer: MEDICARE

## 2018-08-14 ENCOUNTER — OFFICE VISIT (OUTPATIENT)
Dept: INTERNAL MEDICINE CLINIC | Age: 53
DRG: 617 | End: 2018-08-14
Payer: MEDICARE

## 2018-08-14 ENCOUNTER — APPOINTMENT (OUTPATIENT)
Dept: GENERAL RADIOLOGY | Age: 53
DRG: 617 | End: 2018-08-14
Attending: PODIATRIST
Payer: MEDICARE

## 2018-08-14 DIAGNOSIS — S91.301D OPEN WOUND OF RIGHT FOOT, SUBSEQUENT ENCOUNTER: Primary | ICD-10-CM

## 2018-08-14 DIAGNOSIS — E11.621 DIABETIC ULCER OF LEFT MIDFOOT ASSOCIATED WITH TYPE 2 DIABETES MELLITUS, WITH FAT LAYER EXPOSED (HCC): Primary | ICD-10-CM

## 2018-08-14 DIAGNOSIS — S91.302D OPEN WOUND OF LEFT FOOT, SUBSEQUENT ENCOUNTER: ICD-10-CM

## 2018-08-14 DIAGNOSIS — L97.422 DIABETIC ULCER OF LEFT MIDFOOT ASSOCIATED WITH TYPE 2 DIABETES MELLITUS, WITH FAT LAYER EXPOSED (HCC): Primary | ICD-10-CM

## 2018-08-14 PROBLEM — L03.115 CELLULITIS OF RIGHT FOOT: Status: ACTIVE | Noted: 2018-08-14

## 2018-08-14 PROCEDURE — 85652 RBC SED RATE AUTOMATED: CPT

## 2018-08-14 PROCEDURE — 2580000003 HC RX 258: Performed by: STUDENT IN AN ORGANIZED HEALTH CARE EDUCATION/TRAINING PROGRAM

## 2018-08-14 PROCEDURE — 83036 HEMOGLOBIN GLYCOSYLATED A1C: CPT

## 2018-08-14 PROCEDURE — 85025 COMPLETE CBC W/AUTO DIFF WBC: CPT

## 2018-08-14 PROCEDURE — 73630 X-RAY EXAM OF FOOT: CPT

## 2018-08-14 PROCEDURE — 80053 COMPREHEN METABOLIC PANEL: CPT

## 2018-08-14 PROCEDURE — 1200000000 HC SEMI PRIVATE

## 2018-08-14 PROCEDURE — 86140 C-REACTIVE PROTEIN: CPT

## 2018-08-14 PROCEDURE — 99213 OFFICE O/P EST LOW 20 MIN: CPT | Performed by: STUDENT IN AN ORGANIZED HEALTH CARE EDUCATION/TRAINING PROGRAM

## 2018-08-14 PROCEDURE — 6360000002 HC RX W HCPCS: Performed by: STUDENT IN AN ORGANIZED HEALTH CARE EDUCATION/TRAINING PROGRAM

## 2018-08-14 PROCEDURE — 36415 COLL VENOUS BLD VENIPUNCTURE: CPT

## 2018-08-14 RX ORDER — FUROSEMIDE 40 MG/1
40 TABLET ORAL 2 TIMES DAILY
Status: DISCONTINUED | OUTPATIENT
Start: 2018-08-15 | End: 2018-08-20 | Stop reason: HOSPADM

## 2018-08-14 RX ORDER — DOCUSATE SODIUM 100 MG/1
100 CAPSULE, LIQUID FILLED ORAL 2 TIMES DAILY PRN
Status: DISCONTINUED | OUTPATIENT
Start: 2018-08-14 | End: 2018-08-20 | Stop reason: HOSPADM

## 2018-08-14 RX ORDER — NALOXONE HYDROCHLORIDE 0.4 MG/ML
0.4 INJECTION, SOLUTION INTRAMUSCULAR; INTRAVENOUS; SUBCUTANEOUS PRN
Status: DISCONTINUED | OUTPATIENT
Start: 2018-08-14 | End: 2018-08-20 | Stop reason: HOSPADM

## 2018-08-14 RX ORDER — CARVEDILOL 6.25 MG/1
6.25 TABLET ORAL 2 TIMES DAILY WITH MEALS
Status: DISCONTINUED | OUTPATIENT
Start: 2018-08-15 | End: 2018-08-20 | Stop reason: HOSPADM

## 2018-08-14 RX ORDER — DEXTROSE MONOHYDRATE 25 G/50ML
12.5 INJECTION, SOLUTION INTRAVENOUS PRN
Status: DISCONTINUED | OUTPATIENT
Start: 2018-08-14 | End: 2018-08-14 | Stop reason: SDUPTHER

## 2018-08-14 RX ORDER — ACETAMINOPHEN 500 MG
500 TABLET ORAL EVERY 6 HOURS PRN
Status: DISCONTINUED | OUTPATIENT
Start: 2018-08-14 | End: 2018-08-20 | Stop reason: HOSPADM

## 2018-08-14 RX ORDER — DEXTROSE MONOHYDRATE 50 MG/ML
100 INJECTION, SOLUTION INTRAVENOUS PRN
Status: DISCONTINUED | OUTPATIENT
Start: 2018-08-14 | End: 2018-08-20 | Stop reason: HOSPADM

## 2018-08-14 RX ORDER — PROMETHAZINE HYDROCHLORIDE 12.5 MG/1
12.5 TABLET ORAL EVERY 6 HOURS PRN
Status: DISCONTINUED | OUTPATIENT
Start: 2018-08-14 | End: 2018-08-20 | Stop reason: HOSPADM

## 2018-08-14 RX ORDER — ATORVASTATIN CALCIUM 20 MG/1
20 TABLET, FILM COATED ORAL DAILY
Status: DISCONTINUED | OUTPATIENT
Start: 2018-08-15 | End: 2018-08-20 | Stop reason: HOSPADM

## 2018-08-14 RX ORDER — HYDROCODONE BITARTRATE AND ACETAMINOPHEN 5; 325 MG/1; MG/1
1 TABLET ORAL EVERY 6 HOURS PRN
Status: DISCONTINUED | OUTPATIENT
Start: 2018-08-14 | End: 2018-08-20 | Stop reason: HOSPADM

## 2018-08-14 RX ORDER — AMITRIPTYLINE HYDROCHLORIDE 50 MG/1
100 TABLET, FILM COATED ORAL NIGHTLY
Status: DISCONTINUED | OUTPATIENT
Start: 2018-08-14 | End: 2018-08-20 | Stop reason: HOSPADM

## 2018-08-14 RX ORDER — DEXTROSE MONOHYDRATE 25 G/50ML
12.5 INJECTION, SOLUTION INTRAVENOUS PRN
Status: DISCONTINUED | OUTPATIENT
Start: 2018-08-14 | End: 2018-08-15

## 2018-08-14 RX ORDER — NICOTINE POLACRILEX 4 MG
15 LOZENGE BUCCAL PRN
Status: DISCONTINUED | OUTPATIENT
Start: 2018-08-14 | End: 2018-08-20 | Stop reason: HOSPADM

## 2018-08-14 RX ORDER — ONDANSETRON 2 MG/ML
4 INJECTION INTRAMUSCULAR; INTRAVENOUS EVERY 6 HOURS PRN
Status: DISCONTINUED | OUTPATIENT
Start: 2018-08-14 | End: 2018-08-20 | Stop reason: HOSPADM

## 2018-08-14 RX ORDER — ASPIRIN 81 MG/1
81 TABLET ORAL DAILY
Status: DISCONTINUED | OUTPATIENT
Start: 2018-08-15 | End: 2018-08-14

## 2018-08-14 RX ORDER — DIPHENHYDRAMINE HYDROCHLORIDE 50 MG/ML
25 INJECTION INTRAMUSCULAR; INTRAVENOUS EVERY 6 HOURS PRN
Status: DISCONTINUED | OUTPATIENT
Start: 2018-08-14 | End: 2018-08-20 | Stop reason: HOSPADM

## 2018-08-14 RX ADMIN — ENOXAPARIN SODIUM 40 MG: 40 INJECTION SUBCUTANEOUS at 23:23

## 2018-08-14 RX ADMIN — VANCOMYCIN HYDROCHLORIDE 1250 MG: 10 INJECTION, POWDER, LYOPHILIZED, FOR SOLUTION INTRAVENOUS at 23:23

## 2018-08-14 ASSESSMENT — PAIN DESCRIPTION - ORIENTATION: ORIENTATION: RIGHT

## 2018-08-14 ASSESSMENT — ENCOUNTER SYMPTOMS: WHEEZING: 1

## 2018-08-14 ASSESSMENT — PAIN DESCRIPTION - ONSET: ONSET: ON-GOING

## 2018-08-14 ASSESSMENT — PAIN DESCRIPTION - PROGRESSION: CLINICAL_PROGRESSION: GRADUALLY WORSENING

## 2018-08-14 ASSESSMENT — PAIN DESCRIPTION - LOCATION: LOCATION: FOOT

## 2018-08-14 ASSESSMENT — PAIN DESCRIPTION - DESCRIPTORS: DESCRIPTORS: BURNING;POUNDING

## 2018-08-14 ASSESSMENT — PAIN DESCRIPTION - FREQUENCY: FREQUENCY: CONTINUOUS

## 2018-08-14 ASSESSMENT — PAIN SCALES - GENERAL: PAINLEVEL_OUTOF10: 8

## 2018-08-14 NOTE — DISCHARGE SUMMARY
Patient ID: Araceli Geovany      Patient's PCP: Jennifer Finley MD    Admit Date: 8/14/2018    Discharge Date: 8/20/18    Admitting Physician: Slade Cormier DPM    Discharge Physician: Myles Lloyd DPM PGY-2    Discharge Diagnoses:   Patient Active Problem List   Diagnosis    Feet clawing    Diabetic neuropathy ([address]    Tinea pedis    Dyslipidemia    HTN (hypertension)    Amenorrhea    Dyspareunia    Neuropathy    Bacterial vaginosis    Pain in back    Anomalies of nails    Tobacco abuse    Vaginal bleeding, abnormal    Carpal tunnel syndrome    Scalp lesion    ETOH abuse    Diabetes mellitus, type II ([address]    [facility]) [facility])    Pseudocyst, pancreas    Pancreatitis    Asthma    Pain, eye, right    Pneumonia    Nicotine addiction    Acute pancreatitis    Hypoxia    Onychomycosis    Depressive disorder, not elsewhere classified    Anxiety state    Tinea pedis    Open wound of left foot    Burn    Obesity (BMI 30-39. 9)    Diabetic ulcer of left foot associated with type 2 diabetes mellitus ([address]    Pain medication agreement signed    Post-op pain    Open wound of left foot with complication    Cellulitis    Diabetic foot infection ([address]    Bilateral lower extremity edema    Chronic multifocal osteomyelitis of left foot ([address]    Open wound of right foot    Diabetic ulcer of right foot associated with type 2 diabetes mellitus, with fat layer exposed ([address]    Cellulitis of right foot       Hospital Course:  Ms. Declan Valencia is a 53 yo female with PMHX of DM with peripherally neuropathy, Hx of DVT, HLD, and history of non-compliance, that was a direct admit from podiatry clinic. Upon admission, labs consisting of CBC, BMP, ESR, Hgb A1c, wound culture were ordered. X-rays of feet bilaterally were ordered. Arterial duplex scan was ordered. Patient was started on IV Vancomycin/Zosyn. Medicine was consulted for medical management.  ID was consulted for antibiotic management due to history of diabetic foot wounds. Pharmacy was consulted for pain management due to history of methadone use in medication list.    On 8/15/18 an MRI of the Right foot was ordered to rule out any underlying osteomyelitis due chronic non-healing diabetic foot ulcer. Although x-rays were unremarkable for OM, patients clinical picture and Elevated ESR (ESR 79) with chronic wound is concerning for underlying bone infection. Patient obtained arterial duplex today and was noted to not have any significant changes since the previous scan on 8/10/17. Right CRISTIAN 1.06, Left CRISTIAN 0.94. No significant inflow diseases noted b/l. MRI was not able to be completed due patient moving last night. MRI will be performed tomorrow. On 8/16/18 MRI of Right foot was performed and findings were dictated by radiology as Osteomyelitis of the distal shaft and head of the fifth metatarsal in addition to the fifth proximal phalanx. Patient will benefit from surgical intervention during this hospital course. On 8/17/18 patient went to OR for I&D of the right foot with partial amputation of the 5th ray and closure. Procedure was thought to be definitive, tissue culture from the surgery was positive for Enterobacter cloacae, and lan epi, path report for clearance fragment of bone showed Acute osteomyelitis and acute inflammation of adjacent soft tissue, also states that bone and soft tissue margins of amputation are free of inflammation. She was discharged on Zosyn. Consults: cardiology, ID and Internal medicine    Significant Diagnostic Studies:  X-ray of Right and left foot, Venous duplex bilateral, cardiac stess test, MRI of right foot. Treatments: IV hydration, antibiotics: Zosyn and Meropenem and surgery: I&D with partial 5th ray amputation.      Disposition: SNF    Discharged Condition: Stable    Follow Up: Podiatric Physician in one week    Discharge Medications:   Ifeoma Kovacs   [place] Medication Instructions RVT:[id number]    Printed on:08/14/18 9905   Medication Information                      Accu-Chek Softclix Lancets MISC  1 strip by Does not apply route 2 times daily Check before breakfast and before going to bed             amitriptyline (ELAVIL) 100 MG tablet  Take 1 tablet by mouth nightly             ammonium lactate (LAC-HYDRIN) 12 % lotion  Apply topically daily. aspirin EC 81 MG EC tablet  Take 1 tablet by mouth daily             atorvastatin (LIPITOR) 20 MG tablet  Take 1 tablet by mouth daily             Blood Glucose Monitoring Suppl (ACCU-CHEK BARBIE PLUS) W/DEVICE KIT  1 kit by Does not apply route 2 times daily             Blood Glucose Monitoring Suppl (TRUE METRIX AIR GLUCOSE METER) LASHON  1 Units by Does not apply route 2 times daily             blood glucose test strips (TRUE METRIX BLOOD GLUCOSE TEST) strip  1 each by In Vitro route 2 times daily As needed. carvedilol (COREG) 6.25 MG tablet  Take 1 tablet by mouth 2 times daily             furosemide (LASIX) 40 MG tablet  Take 1 tablet by mouth 2 times daily             gabapentin (NEURONTIN) 800 MG tablet  Take 1 tablet by mouth 4 times daily for 90 days. .             Gauze Pads & Dressings MISC  Please dispense 4x8 guaze, kerlix, and ace             Heat Wraps (THERMACARE BACK/HIP) MISC  1 patch by Does not apply route as needed (back pain)             insulin glargine (BASAGLAR KWIKPEN) 100 UNIT/ML injection pen  Inject 50 Units into the skin nightly             Insulin Pen Needle 31G X 5 MM MISC  1 each by Does not apply route daily             Insulin Syringe-Needle U-100 30G X 5/16\" 0.5 ML MISC  1 each by Does not apply route daily             Lancets MISC  1 each by Does not apply route 2 times daily PHARMACY MAY SUBSTITUTE TO TRUE METRIX LANCETS             metFORMIN (GLUCOPHAGE) 500 MG tablet  Take 2 tablets by mouth 2 times daily (with meals)             METHADONE HCL PO  Take 146 mg by mouth daily

## 2018-08-14 NOTE — PROGRESS NOTES
from today's appointment.  Patient understood.   -Patient will be admitted at [facility] PGY1  Pager [phone]

## 2018-08-14 NOTE — CONSULTS
Phcy-to-dose vancomycin (for cellulitis of right foot) consult was requested. Unable to obtain weight yet. Patient's weight from 3 months ago = 186 lbs (height = 5' 2\"). CMP ordered but not yet displayed. SrCr from Apr/2018 = 1.2 mg/dL. I will order vancomycin 1.25 g IV x1 dose (which is 15 mg per kg) starting at ~midnight (since incompatible with Zosyn extended infusion). Clinical pharmacist will follow-up and order maintenance dosing, will order and monitor trough and adjust vancomycin dose if needed. Original order for Zosyn 3.375 g IV Q6H, changed to 3.375 g IV Q8H (each bag over 4 hours) per extended infusion protocol.

## 2018-08-15 ENCOUNTER — APPOINTMENT (OUTPATIENT)
Dept: MRI IMAGING | Age: 53
DRG: 617 | End: 2018-08-15
Attending: PODIATRIST
Payer: MEDICARE

## 2018-08-15 LAB
A/G RATIO: 1 (ref 1.1–2.2)
ALBUMIN SERPL-MCNC: 3.5 G/DL (ref 3.4–5)
ALP BLD-CCNC: 218 U/L (ref 40–129)
ALT SERPL-CCNC: 17 U/L (ref 10–40)
ANION GAP SERPL CALCULATED.3IONS-SCNC: 10 MMOL/L (ref 3–16)
ANION GAP SERPL CALCULATED.3IONS-SCNC: 8 MMOL/L (ref 3–16)
AST SERPL-CCNC: 14 U/L (ref 15–37)
BASOPHILS ABSOLUTE: 0 K/UL (ref 0–0.2)
BASOPHILS ABSOLUTE: 0 K/UL (ref 0–0.2)
BASOPHILS RELATIVE PERCENT: 0.3 %
BASOPHILS RELATIVE PERCENT: 0.4 %
BILIRUB SERPL-MCNC: <0.2 MG/DL (ref 0–1)
BUN BLDV-MCNC: 21 MG/DL (ref 7–20)
BUN BLDV-MCNC: 22 MG/DL (ref 7–20)
C-REACTIVE PROTEIN: 28.1 MG/L (ref 0–5.1)
CALCIUM SERPL-MCNC: 9.5 MG/DL (ref 8.3–10.6)
CALCIUM SERPL-MCNC: 9.8 MG/DL (ref 8.3–10.6)
CHLORIDE BLD-SCNC: 94 MMOL/L (ref 99–110)
CHLORIDE BLD-SCNC: 99 MMOL/L (ref 99–110)
CO2: 30 MMOL/L (ref 21–32)
CO2: 31 MMOL/L (ref 21–32)
CREAT SERPL-MCNC: 1.2 MG/DL (ref 0.6–1.1)
CREAT SERPL-MCNC: 1.4 MG/DL (ref 0.6–1.1)
EOSINOPHILS ABSOLUTE: 0.2 K/UL (ref 0–0.6)
EOSINOPHILS ABSOLUTE: 0.2 K/UL (ref 0–0.6)
EOSINOPHILS RELATIVE PERCENT: 2.5 %
EOSINOPHILS RELATIVE PERCENT: 2.5 %
GFR AFRICAN AMERICAN: 47
GFR AFRICAN AMERICAN: 56
GFR NON-AFRICAN AMERICAN: 39
GFR NON-AFRICAN AMERICAN: 47
GLOBULIN: 3.5 G/DL
GLUCOSE BLD-MCNC: 100 MG/DL (ref 70–99)
GLUCOSE BLD-MCNC: 199 MG/DL (ref 70–99)
GLUCOSE BLD-MCNC: 346 MG/DL (ref 70–99)
GLUCOSE BLD-MCNC: 420 MG/DL (ref 70–99)
GLUCOSE BLD-MCNC: 470 MG/DL (ref 70–99)
GLUCOSE BLD-MCNC: 520 MG/DL (ref 70–99)
HCT VFR BLD CALC: 34.7 % (ref 36–48)
HCT VFR BLD CALC: 35.3 % (ref 36–48)
HEMOGLOBIN: 11.7 G/DL (ref 12–16)
HEMOGLOBIN: 12.1 G/DL (ref 12–16)
INR BLD: 0.91 (ref 0.86–1.14)
LYMPHOCYTES ABSOLUTE: 2.3 K/UL (ref 1–5.1)
LYMPHOCYTES ABSOLUTE: 2.5 K/UL (ref 1–5.1)
LYMPHOCYTES RELATIVE PERCENT: 25.1 %
LYMPHOCYTES RELATIVE PERCENT: 28.1 %
MCH RBC QN AUTO: 30.2 PG (ref 26–34)
MCH RBC QN AUTO: 30.2 PG (ref 26–34)
MCHC RBC AUTO-ENTMCNC: 33.8 G/DL (ref 31–36)
MCHC RBC AUTO-ENTMCNC: 34.2 G/DL (ref 31–36)
MCV RBC AUTO: 88.2 FL (ref 80–100)
MCV RBC AUTO: 89.2 FL (ref 80–100)
MONOCYTES ABSOLUTE: 0.5 K/UL (ref 0–1.3)
MONOCYTES ABSOLUTE: 0.7 K/UL (ref 0–1.3)
MONOCYTES RELATIVE PERCENT: 6.1 %
MONOCYTES RELATIVE PERCENT: 7 %
NEUTROPHILS ABSOLUTE: 5.2 K/UL (ref 1.7–7.7)
NEUTROPHILS ABSOLUTE: 6.6 K/UL (ref 1.7–7.7)
NEUTROPHILS RELATIVE PERCENT: 63 %
NEUTROPHILS RELATIVE PERCENT: 65 %
PDW BLD-RTO: 14.9 % (ref 12.4–15.4)
PDW BLD-RTO: 15.1 % (ref 12.4–15.4)
PERFORMED ON: ABNORMAL
PLATELET # BLD: 345 K/UL (ref 135–450)
PLATELET # BLD: 356 K/UL (ref 135–450)
PMV BLD AUTO: 8 FL (ref 5–10.5)
PMV BLD AUTO: 8.4 FL (ref 5–10.5)
POTASSIUM SERPL-SCNC: 4.3 MMOL/L (ref 3.5–5.1)
POTASSIUM SERPL-SCNC: 4.9 MMOL/L (ref 3.5–5.1)
PROTHROMBIN TIME: 10.4 SEC (ref 9.8–13)
RBC # BLD: 3.89 M/UL (ref 4–5.2)
RBC # BLD: 4 M/UL (ref 4–5.2)
SEDIMENTATION RATE, ERYTHROCYTE: 79 MM/HR (ref 0–30)
SEDIMENTATION RATE, ERYTHROCYTE: 80 MM/HR (ref 0–30)
SODIUM BLD-SCNC: 134 MMOL/L (ref 136–145)
SODIUM BLD-SCNC: 138 MMOL/L (ref 136–145)
TOTAL PROTEIN: 7 G/DL (ref 6.4–8.2)
TROPONIN: <0.01 NG/ML
TROPONIN: <0.01 NG/ML
WBC # BLD: 10.1 K/UL (ref 4–11)
WBC # BLD: 8.2 K/UL (ref 4–11)

## 2018-08-15 PROCEDURE — 80048 BASIC METABOLIC PNL TOTAL CA: CPT

## 2018-08-15 PROCEDURE — 87205 SMEAR GRAM STAIN: CPT

## 2018-08-15 PROCEDURE — 6370000000 HC RX 637 (ALT 250 FOR IP): Performed by: INTERNAL MEDICINE

## 2018-08-15 PROCEDURE — 87186 SC STD MICRODIL/AGAR DIL: CPT

## 2018-08-15 PROCEDURE — 99223 1ST HOSP IP/OBS HIGH 75: CPT | Performed by: INTERNAL MEDICINE

## 2018-08-15 PROCEDURE — 87070 CULTURE OTHR SPECIMN AEROBIC: CPT

## 2018-08-15 PROCEDURE — 6370000000 HC RX 637 (ALT 250 FOR IP): Performed by: STUDENT IN AN ORGANIZED HEALTH CARE EDUCATION/TRAINING PROGRAM

## 2018-08-15 PROCEDURE — 36415 COLL VENOUS BLD VENIPUNCTURE: CPT

## 2018-08-15 PROCEDURE — 85652 RBC SED RATE AUTOMATED: CPT

## 2018-08-15 PROCEDURE — 84484 ASSAY OF TROPONIN QUANT: CPT

## 2018-08-15 PROCEDURE — 93925 LOWER EXTREMITY STUDY: CPT

## 2018-08-15 PROCEDURE — 6360000002 HC RX W HCPCS: Performed by: STUDENT IN AN ORGANIZED HEALTH CARE EDUCATION/TRAINING PROGRAM

## 2018-08-15 PROCEDURE — 6370000000 HC RX 637 (ALT 250 FOR IP): Performed by: FAMILY MEDICINE

## 2018-08-15 PROCEDURE — 93005 ELECTROCARDIOGRAM TRACING: CPT | Performed by: NURSE PRACTITIONER

## 2018-08-15 PROCEDURE — 2580000003 HC RX 258: Performed by: STUDENT IN AN ORGANIZED HEALTH CARE EDUCATION/TRAINING PROGRAM

## 2018-08-15 PROCEDURE — 85610 PROTHROMBIN TIME: CPT

## 2018-08-15 PROCEDURE — 1200000000 HC SEMI PRIVATE

## 2018-08-15 PROCEDURE — APPSS45 APP SPLIT SHARED TIME 31-45 MINUTES: Performed by: NURSE PRACTITIONER

## 2018-08-15 PROCEDURE — 87077 CULTURE AEROBIC IDENTIFY: CPT

## 2018-08-15 PROCEDURE — 85025 COMPLETE CBC W/AUTO DIFF WBC: CPT

## 2018-08-15 RX ORDER — GABAPENTIN 600 MG/1
600 TABLET ORAL 3 TIMES DAILY
Status: DISCONTINUED | OUTPATIENT
Start: 2018-08-15 | End: 2018-08-16

## 2018-08-15 RX ORDER — METHADONE HYDROCHLORIDE 10 MG/1
140 TABLET ORAL DAILY
Status: DISCONTINUED | OUTPATIENT
Start: 2018-08-15 | End: 2018-08-20 | Stop reason: HOSPADM

## 2018-08-15 RX ORDER — BACITRACIN ZINC AND POLYMYXIN B SULFATE 500; 1000 [USP'U]/G; [USP'U]/G
OINTMENT TOPICAL 2 TIMES DAILY
Status: DISCONTINUED | OUTPATIENT
Start: 2018-08-15 | End: 2018-08-20 | Stop reason: HOSPADM

## 2018-08-15 RX ORDER — ASPIRIN 325 MG
325 TABLET ORAL DAILY
COMMUNITY
End: 2019-02-19 | Stop reason: SDUPTHER

## 2018-08-15 RX ORDER — METHADONE HYDROCHLORIDE 10 MG/ML
140 CONCENTRATE ORAL DAILY
Status: DISCONTINUED | OUTPATIENT
Start: 2018-08-15 | End: 2018-08-15 | Stop reason: CLARIF

## 2018-08-15 RX ORDER — AMMONIUM LACTATE 12 G/100G
LOTION TOPICAL PRN
Status: DISCONTINUED | OUTPATIENT
Start: 2018-08-15 | End: 2018-08-20 | Stop reason: HOSPADM

## 2018-08-15 RX ADMIN — FUROSEMIDE 40 MG: 40 TABLET ORAL at 17:48

## 2018-08-15 RX ADMIN — FUROSEMIDE 40 MG: 40 TABLET ORAL at 09:44

## 2018-08-15 RX ADMIN — METHADONE HYDROCHLORIDE 140 MG: 10 TABLET ORAL at 10:20

## 2018-08-15 RX ADMIN — PIPERACILLIN SODIUM AND TAZOBACTAM SODIUM 3.38 G: 3; .375 INJECTION, POWDER, LYOPHILIZED, FOR SOLUTION INTRAVENOUS at 17:51

## 2018-08-15 RX ADMIN — INSULIN LISPRO 3 UNITS: 100 INJECTION, SOLUTION INTRAVENOUS; SUBCUTANEOUS at 12:27

## 2018-08-15 RX ADMIN — GABAPENTIN 600 MG: 600 TABLET, FILM COATED ORAL at 21:11

## 2018-08-15 RX ADMIN — INSULIN LISPRO 18 UNITS: 100 INJECTION, SOLUTION INTRAVENOUS; SUBCUTANEOUS at 17:49

## 2018-08-15 RX ADMIN — INSULIN HUMAN 15 UNITS: 100 INJECTION, SOLUTION PARENTERAL at 02:03

## 2018-08-15 RX ADMIN — PIPERACILLIN SODIUM AND TAZOBACTAM SODIUM 3.38 G: 3; .375 INJECTION, POWDER, LYOPHILIZED, FOR SOLUTION INTRAVENOUS at 09:44

## 2018-08-15 RX ADMIN — VANCOMYCIN HYDROCHLORIDE 1000 MG: 10 INJECTION, POWDER, LYOPHILIZED, FOR SOLUTION INTRAVENOUS at 23:29

## 2018-08-15 RX ADMIN — CARVEDILOL 6.25 MG: 6.25 TABLET, FILM COATED ORAL at 17:48

## 2018-08-15 RX ADMIN — VANCOMYCIN HYDROCHLORIDE 1000 MG: 10 INJECTION, POWDER, LYOPHILIZED, FOR SOLUTION INTRAVENOUS at 11:52

## 2018-08-15 RX ADMIN — ATORVASTATIN CALCIUM 20 MG: 20 TABLET, FILM COATED ORAL at 09:43

## 2018-08-15 RX ADMIN — HYDROCODONE BITARTRATE AND ACETAMINOPHEN 1 TABLET: 5; 325 TABLET ORAL at 17:57

## 2018-08-15 RX ADMIN — PIPERACILLIN SODIUM AND TAZOBACTAM SODIUM 3.38 G: 3; .375 INJECTION, POWDER, LYOPHILIZED, FOR SOLUTION INTRAVENOUS at 02:03

## 2018-08-15 RX ADMIN — GABAPENTIN 600 MG: 600 TABLET, FILM COATED ORAL at 09:56

## 2018-08-15 RX ADMIN — AMITRIPTYLINE HYDROCHLORIDE 100 MG: 50 TABLET, FILM COATED ORAL at 02:03

## 2018-08-15 RX ADMIN — ENOXAPARIN SODIUM 40 MG: 40 INJECTION SUBCUTANEOUS at 09:44

## 2018-08-15 RX ADMIN — INSULIN LISPRO 3 UNITS: 100 INJECTION, SOLUTION INTRAVENOUS; SUBCUTANEOUS at 10:01

## 2018-08-15 RX ADMIN — CARVEDILOL 6.25 MG: 6.25 TABLET, FILM COATED ORAL at 09:44

## 2018-08-15 RX ADMIN — HYDROCODONE BITARTRATE AND ACETAMINOPHEN 1 TABLET: 5; 325 TABLET ORAL at 09:55

## 2018-08-15 RX ADMIN — AMITRIPTYLINE HYDROCHLORIDE 100 MG: 50 TABLET, FILM COATED ORAL at 21:11

## 2018-08-15 RX ADMIN — GABAPENTIN 600 MG: 600 TABLET, FILM COATED ORAL at 16:07

## 2018-08-15 RX ADMIN — INSULIN GLARGINE 40 UNITS: 100 INJECTION, SOLUTION SUBCUTANEOUS at 02:04

## 2018-08-15 ASSESSMENT — PAIN DESCRIPTION - ONSET
ONSET: ON-GOING

## 2018-08-15 ASSESSMENT — PAIN DESCRIPTION - PAIN TYPE
TYPE: CHRONIC PAIN

## 2018-08-15 ASSESSMENT — PAIN DESCRIPTION - LOCATION
LOCATION: BACK;FOOT
LOCATION: BACK;FOOT
LOCATION: BACK
LOCATION: BACK;FOOT

## 2018-08-15 ASSESSMENT — PAIN DESCRIPTION - PROGRESSION
CLINICAL_PROGRESSION: NOT CHANGED

## 2018-08-15 ASSESSMENT — PAIN SCALES - GENERAL
PAINLEVEL_OUTOF10: 0
PAINLEVEL_OUTOF10: 7
PAINLEVEL_OUTOF10: 6
PAINLEVEL_OUTOF10: 7
PAINLEVEL_OUTOF10: 0
PAINLEVEL_OUTOF10: 0
PAINLEVEL_OUTOF10: 6
PAINLEVEL_OUTOF10: 0
PAINLEVEL_OUTOF10: 6

## 2018-08-15 ASSESSMENT — PAIN DESCRIPTION - FREQUENCY
FREQUENCY: CONTINUOUS

## 2018-08-15 ASSESSMENT — ENCOUNTER SYMPTOMS
VOMITING: 0
SHORTNESS OF BREATH: 0
NAUSEA: 0
BLURRED VISION: 0

## 2018-08-15 ASSESSMENT — PAIN DESCRIPTION - DESCRIPTORS
DESCRIPTORS: ACHING

## 2018-08-15 ASSESSMENT — PAIN DESCRIPTION - ORIENTATION
ORIENTATION: RIGHT;LEFT;LOWER
ORIENTATION: LEFT;RIGHT;LOWER
ORIENTATION: RIGHT;LEFT;LOWER
ORIENTATION: LOWER

## 2018-08-15 NOTE — PLAN OF CARE
Problem: Nutrition  Goal: Optimal nutrition therapy  Outcome: Ongoing  Nutrition Problem: Increased nutrient needs  Intervention: Food and/or Nutrient Delivery: Continue current diet, Start ONS  Nutritional Goals: pt will tolerate and consume 75% or more of all meals and supplements offered to promote wound healing

## 2018-08-15 NOTE — PROGRESS NOTES
Nutrition Assessment    Type and Reason for Visit: Initial, Positive Nutrition Screen    Nutrition Recommendations:   · Continue CC3 diet, monitor and encourage intake  · Patient educated on DM diet   · Start Joshua, BID to aid w/ wound healing     Malnutrition Assessment:  · Malnutrition Status: No malnutrition  · Context: Acute illness or injury  · Findings of the 6 clinical characteristics of malnutrition (Minimum of 2 out of 6 clinical characteristics is required to make the diagnosis of moderate or severe Protein Calorie Malnutrition based on AND/ASPEN Guidelines):  1. Energy Intake-Greater than 75% (per pt report ),      2. Weight Loss-No significant weight loss,    3. Fat Loss-Unable to assess,    4. Muscle Loss-Unable to assess,    5. Fluid Accumulation-No significant fluid accumulation,    6.  Strength-Not measured    Nutrition Diagnosis:   · Problem: Increased nutrient needs  · Etiology: related to Increased demand for energy/nutrients due to     Signs and symptoms:  as evidenced by Presence of wounds    Nutrition Assessment:  · Subjective Assessment: Positive nutrition screen for wounds. Pt presents w/ R foot cellulitis and a wound and PMHx of DM, Etoh abuse, HTN, and HLD. She reports a good appetite w/ no c/o n/v/d/c. UBW of 180lb . CBW of 183lb 8oz. Pt willing to trial Joshua to aid w/ wound healing. Provided pt w/ DM diet education and provided handout Carbhydrate Counting for People with Diabetes (NCM). Needs reinforcement.   · Nutrition-Focused Physical Findings: +2 pitting edema BLE; no BM recorded  · Wound Type: Diabetic Ulcer  · Current Nutrition Therapies:  · Oral Diet Orders: Carb Control 3 Carbs/Meal   · Oral Diet intake: Unable to assess  · Oral Nutrition Supplement (ONS) Orders: None  · Anthropometric Measures:  · Ht: 5' 2\" (157.5 cm)   · Current Body Wt: 183 lb 6.8 oz (83.2 kg)  · Admission Body Wt: 183 lb 6.8 oz (83.2 kg)  · Usual Body Wt: 180 lb (81.6 kg)  · Ideal Body Wt: 110 lb

## 2018-08-15 NOTE — PROGRESS NOTES
Patient arrived as a direct admit via wheelchair from a recommendation of outpatient podiatry. Her vital signs at admission were stable with a slightly elevated BP. Patient was oriented to room. Call light within reach, Family at bedside. Denies any needs at this time.

## 2018-08-15 NOTE — PROGRESS NOTES
[facility] [employer]  Diabetes Education   Progress Note       NAME:  Mitra Machado  MEDICAL RECORD NUMBER:  [id number]  AGE: 52 y.o. GENDER: female  : 1965  TODAY'S DATE:  8/15/2018    Subjective   Reason for Diabetic Education Evaluation and Assessment: General diabetes education    Visit Type: evaluation      Mitra Machado is a 52 y.o. female referred by:       [x] Physician  [] Nursing  [] Chart Review   [] Other:     PAST MEDICAL HISTORY        Diagnosis Date    Amenorrhea     Anomalies of nails     Asthma 04    Athlete's foot 2010    Bacterial vaginosis 2008    Carpal tunnel syndrome 2007    Diabetes mellitus type II 2007    Diabetic neuropathy [facility] 8/10    DVT (deep venous thrombosis) ([facility] 3/2004    Dyslipidemia 2009    Dyspareunia 2009    ETOH abuse 3/04/2007    Feet clawing     HTN (hypertension)     Hx of blood clots     Hyperlipidemia     MRSA (methicillin resistant staph aureus) culture positive 2017; 2017    foot; leg     Neuropathy 2009    polyneuropathy    Pain, back 2008    Pain, eye, right 04    Pancreatitis 04    Pseudocyst, pancreas 04    Scalp lesion 2007    Tinea pedis     Tobacco abuse 2008    Vaginal bleeding, abnormal 2007       PAST SURGICAL HISTORY    Past Surgical History:   Procedure Laterality Date     SECTION  unknown    OTHER SURGICAL HISTORY Left 2016    I & D left foot    OTHER SURGICAL HISTORY Right 10/20/2017    RIGHT GASTROC LENGTHENING ENDOSCOPIC, INJECTION OF AMNI GRAFT    OTHER SURGICAL HISTORY Right 2018    Diabetic foot ulcer I&D w/ integra graft application    PRE-MALIGNANT / BENIGN SKIN LESION EXCISION  7003    cryotherapy done on lesion    TOE AMPUTATION Left 2017    AMPUTATION LEFT GREAT TOE                    FAMILY HISTORY    No family history on file.     SOCIAL HISTORY    Social History   Substance Use Topics    Smoking status: Former Smoker Packs/day: 1.00     Years: 30.00     Types: Cigarettes     Quit date: 4/1/2018    Smokeless tobacco: Never Used    Alcohol use No      Comment: hx of etoh abuse, denies recent etoh use       ALLERGIES    Allergies   Allergen Reactions    Sulfa Antibiotics Itching       MEDICATIONS     insulin lispro  0-18 Units Subcutaneous TID WC    insulin lispro  0-9 Units Subcutaneous Nightly    collagenase   Topical Daily    bacitracin-polymyxin b   Topical BID    vancomycin  1,000 mg Intravenous Q12H    methadone  140 mg Oral Daily    gabapentin  600 mg Oral TID    piperacillin-tazobactam  3.375 g Intravenous Q8H    enoxaparin  40 mg Subcutaneous Daily    amitriptyline  100 mg Oral Nightly    atorvastatin  20 mg Oral Daily    carvedilol  6.25 mg Oral BID WC    furosemide  40 mg Oral BID    insulin glargine  40 Units Subcutaneous Nightly       Objective        Patient Active Problem List   Diagnosis Code    Feet clawing Q66.89    Diabetic neuropathy (HCC) E11.40    Tinea pedis B35.3    Dyslipidemia E78.5    HTN (hypertension) I10    Amenorrhea N91.2    Dyspareunia [id number]    Neuropathy G62.9    Bacterial vaginosis N76.0, B96.89    Pain in back M54.9    Anomalies of nails Q84.6    Tobacco abuse Z72.0    Vaginal bleeding, abnormal N93.9    Carpal tunnel syndrome G56.00    Scalp lesion L98.9    ETOH abuse F10.10    Diabetes mellitus, type II (HCC) E11.9    DVT (deep venous thrombosis) ([facility] I82.409    Pseudocyst, pancreas K86.3    Pancreatitis K85.90    Asthma J45.909    Pain, eye, right H57.11    Pneumonia J18.9    Nicotine addiction F17.200    Acute pancreatitis K85.90    Hypoxia R09.02    Onychomycosis B35.1    Depressive disorder, not elsewhere classified F32.9    Anxiety state F41.1    Tinea pedis B35.3    Open wound of left foot S91.302A    Burn T30.0    Obesity (BMI 30-39. 9) E66.9    Diabetic ulcer of left foot associated with type 2 diabetes mellitus ([facility] E11.621, understand S/S of Hypoglycemia? Reviewed symptoms, prevention and treatment. Level of patient/caregiver understanding able to:     [] Verbalized Understanding   [] Demonstrated Understanding       [] Teach Back       [x] Needs Reinforcement     []  Other:                    Does the patient/caregiver understand S/S of Hyperglycemia? Reviewed symptoms, prevention and treatment. Level of patient/caregiver understanding able to:      [] Verbalized Understanding   [] Demonstrated Understanding       [] Teach Back       [x] Needs Reinforcement     []  Other:       Met with Pt. & daughter. Pt states that she has had diabetes since 2002. Pt & daughter state that pt is just starting to monitor her blood sugars more often than she has done in the past. Pt's daughter states that they are trying to monitor pt's blood sugars 1-2X/day. Discussed with pt & daughter target blood glucose & hgbA1c levels,hyperglycemia,hypoglycemia,insulin,foot care,eye care,sick day rules and the importance of eating every 4-5 hours. Pt verbalized partial understanding but kept falling asleep during the discussion. Staff nurse notified. Daughter states that pt. \" Falls asleep all of the time and that this is not new behavior\". Pt's daughter verbalized understanding but needs reinforcement. Pt given a diabetes education booklet and encouraged to call with questions or concerns.     Plan        Dietary Consult Made:  yes  Social Service Consult Made:  No  Hospice Care: N/A    Given Diabetic Meter: No   [] AccuCheck Abigail   [] One Touch   [] AccuCheck  [] Freestyle  [] Other:     Discharge Plan:  Recommend Outpatient Diabetic Education Classes : N/A  Placement for patient upon discharge: home with support        Electronically signed by Da Camacho, RN, MSN, CDE on 8/15/2018 at 1:04 PM

## 2018-08-15 NOTE — CONSULTS
& D left foot    OTHER SURGICAL HISTORY Right 10/20/2017    RIGHT GASTROC LENGTHENING ENDOSCOPIC, INJECTION OF AMNI GRAFT    OTHER SURGICAL HISTORY Right 04/26/2018    Diabetic foot ulcer I&D w/ integra graft application    PRE-MALIGNANT / BENIGN SKIN LESION EXCISION  7/7003    cryotherapy done on lesion    TOE AMPUTATION Left 02/24/2017    AMPUTATION LEFT GREAT TOE                    Medications Prior to Admission:      Prior to Admission medications    Medication Sig Start Date End Date Taking? Authorizing Provider   amitriptyline (ELAVIL) 100 MG tablet Take 1 tablet by mouth nightly 8/6/18   Heena Perkins MD   gabapentin (NEURONTIN) 800 MG tablet Take 1 tablet by mouth 4 times daily for 90 days. . 8/1/18 10/30/18  Valery Shaikh MD   blood glucose test strips (TRUE METRIX BLOOD GLUCOSE TEST) strip 1 each by In Vitro route 2 times daily As needed.  7/23/18   Mary Duke MD   Lancets MISC 1 each by Does not apply route 2 times daily PHARMACY MAY SUBSTITUTE TO TRUE METRIX LANCETS 7/23/18   Mary Duke MD   insulin glargine [facility]) 100 UNIT/ML injection pen Inject 50 Units into the skin nightly 6/7/18   Hermila Kohler MD   nicotine (NICODERM CQ) 14 MG/24HR Place 1 patch onto the skin every 24 hours 6/5/18   Chiquita Bumpers, DPM   omeprazole (PRILOSEC) 20 MG delayed release capsule Take 1 capsule by mouth Daily 5/21/18   Jaelyn Arizmendi MD   atorvastatin (LIPITOR) 20 MG tablet Take 1 tablet by mouth daily 5/21/18   Santos Negron MD   aspirin EC 81 MG EC tablet Take 1 tablet by mouth daily 5/11/18   Preet Hong MD   carvedilol (COREG) 6.25 MG tablet Take 1 tablet by mouth 2 times daily 5/11/18   Preet Hong MD   furosemide (LASIX) 40 MG tablet Take 1 tablet by mouth 2 times daily 5/11/18   Preet Hong MD   metFORMIN (GLUCOPHAGE) 500 MG tablet Take 2 tablets by mouth 2 times daily (with meals) 4/30/18   Catalina Machado MD   Gauze Pads & Dressings MISC Please dispense 4x8 filiberto kerlix, and ace 2/23/18   Stacey Isbell DPM   ammonium lactate (LAC-HYDRIN) 12 % lotion Apply topically daily. 1/29/18   Stacey Isbell DPM   Blood Glucose Monitoring Suppl (TRUE METRIX AIR GLUCOSE METER) LASHON 1 Units by Does not apply route 2 times daily 8/16/17   Matilda Guy MD   Accu-Chek Softclix Lancets MISC 1 strip by Does not apply route 2 times daily Check before breakfast and before going to bed 4/20/17   Akil Muniz MD   Blood Glucose Monitoring Suppl (ACCU-CHEK BARBIE PLUS) W/DEVICE KIT 1 kit by Does not apply route 2 times daily 4/20/17   Akil Muniz MD   Insulin Pen Needle 31G X 5 MM MISC 1 each by Does not apply route daily 2/2/17   Akil Muniz MD   Heat Wraps [facility] BACK/HIP) MISC 1 patch by Does not apply route as needed (back pain) 9/1/16   Akil Muniz MD   Insulin Syringe-Needle U-100 30G X 5/16\" 0.5 ML MISC 1 each by Does not apply route daily 9/1/16   Akli Muniz MD   METHADONE HCL PO Take 146 mg by mouth daily    Historical Provider, MD       Allergies:  Sulfa antibiotics    Social History:      TOBACCO:   reports that she quit smoking about 4 months ago. Her smoking use included Cigarettes. She has a 30.00 pack-year smoking history. She has never used smokeless tobacco.  ETOH:   reports that she does not drink alcohol. Family History:      Reviewed in detail and negative for DM, CAD, Cancer, CVA. Positive as follows:    No family history on file. REVIEW OF SYSTEMS:   Pertinent positives as noted in the HPI. All other systems reviewed and negative. ROS: Review of Systems   Constitutional: Positive for fever. Negative for chills, diaphoresis, malaise/fatigue and weight loss. Eyes: Negative for blurred vision. Respiratory: Negative for shortness of breath. Cardiovascular: Positive for leg swelling. Negative for chest pain. Gastrointestinal: Negative for nausea and vomiting. Musculoskeletal: Negative for falls.    Neurological: Negative for

## 2018-08-15 NOTE — CONSULTS
She reports that she does not drink alcohol or use drugs. Family History:  family history is not on file. Home Medications:  Prior to Admission medications    Medication Sig Start Date End Date Taking? Authorizing Provider   aspirin 325 MG tablet Take 325 mg by mouth daily   Yes Historical Provider, MD   amitriptyline (ELAVIL) 100 MG tablet Take 1 tablet by mouth nightly 8/6/18  Yes Grant Abbott MD   gabapentin (NEURONTIN) 800 MG tablet Take 1 tablet by mouth 4 times daily for 90 days. . 8/1/18 10/30/18 Yes Manuel Fenton MD   insulin glargine [facility]) 100 UNIT/ML injection pen Inject 50 Units into the skin nightly 6/7/18  Yes Umair Alvarenga MD   atorvastatin (LIPITOR) 20 MG tablet Take 1 tablet by mouth daily 5/21/18  Yes Karthik Armenta MD   carvedilol (COREG) 6.25 MG tablet Take 1 tablet by mouth 2 times daily 5/11/18  Yes Prisca Coy MD   furosemide (LASIX) 40 MG tablet Take 1 tablet by mouth 2 times daily 5/11/18  Yes Prisca Coy MD   metFORMIN (GLUCOPHAGE) 500 MG tablet Take 2 tablets by mouth 2 times daily (with meals) 4/30/18  Yes Veronica Moeller MD   ammonium lactate (LAC-HYDRIN) 12 % lotion Apply topically daily. 1/29/18  Yes Eda Garcia DPM   METHADONE HCL PO Take 146 mg by mouth daily   Yes Historical Provider, MD   blood glucose test strips (TRUE METRIX BLOOD GLUCOSE TEST) strip 1 each by In Vitro route 2 times daily As needed.  7/23/18   Bianca Truong MD   Lancets MISC 1 each by Does not apply route 2 times daily PHARMACY MAY SUBSTITUTE TO TRUE METRIX LANCETS 7/23/18   Bianca Truong MD   nicotine (NICODERM CQ) 14 MG/24HR Place 1 patch onto the skin every 24 hours 6/5/18   Ahsan Marquez DPM   omeprazole (PRILOSEC) 20 MG delayed release capsule Take 1 capsule by mouth Daily 5/21/18   Cody Jewell MD   Gauze Pads & Dressings MISC Please dispense 4x8 guaze, kerlix, and ace 2/23/18   Eda Garcia DPM   Accu-Chek Softclix Lancets MISC 1 strip by Does not apply route WNL  Septal wall appears hypokinetic. Normal diastolic  function. Moderate tricuspid regurgitation. The right atrium is dilated. Hx: DMT2  Per IM    tobacco use   Cessation discussed      diabetic neuropathy  Per IM     HTN  Optimal    Hx DVT    asthma / tobacco use   Cessation      Plan:   Please do EKG today   Stress test ordered for today in EPIC      Thanks for allowing me the opportunity  to participate in the evaluation and care of your patients. Please call if we can assist further [address]., APRN, Cardiology   7/15/73869:10 PM    This patient was seen on 8/15/18 in the vascular laboratory area. She is being seen to manage her cardiac care regarding vascular disease. She has cellulitis of the right foot and did have toe amputation. When I see her and talk to her she denies any shortness of breath or dyspnea or chest pains or coronary ischemia. He does have diabetes with neuropathy history of DVT hypertension and pancreas disease. The left great toe amputation in February 2017. She did relate that she quit smoking about 4 months ago. At this time we will evaluate her further to do a cardiac and ischemia evaluation. A stress test is being evaluated. .  Echocardiograph to be done. Lisbeth Vincent M.D.  [address] [facility]

## 2018-08-15 NOTE — PLAN OF CARE
Problem: Falls - Risk of:  Goal: Will remain free from falls  Will remain free from falls   Outcome: Ongoing  Pt calls appropriately, bed alarm on and bed in lowest position. Rounding hourly. Pt has no concerns at this time. Will continue to monitior.

## 2018-08-15 NOTE — CONSULTS
trough ~15 mcg/mL. · Renal function will be monitored closely /dose will be adjusted as appropriate. 2)  Pain Management:   · Pt currently reporting no pain; she was asleep when I entered the room and was very drowsy during our interaction. · Home pain regimen includes:    · Amitriptyline 100 mg QHS (continued on admission)  · Gabapentin 800 mg 4x daily (currently on hold)  · Pt also takes methadone 146 mg PO daily -- receives this medication from [facility] ([phone]). · Would consider restarting methadone to prevent withdrawal while admitted - d/w Dr. Carmen Zacarias. Would monitor closely for drowsiness, sedation, hypotension. · As patient is not currently in pain, would not recommend any changes at this time. If patient does start to have pain, hydrocodone-APAP may not be an effective option as she is opioid tolerant. Will continue to monitor and provide pain recommendations as appropriate. Thanks for the consult! Please call with any questions.   Jeancarlos Munguia PharmD, BCPS  Wireless: [id number]  or [phone]  8/15/2018 9:31 AM

## 2018-08-15 NOTE — PROGRESS NOTES
Podiatric Surgery Daily Progress Note  Marianne Reyes  Subjective :   Patient seen and examined this am at the bedside. Patient denies any acute overnight events. Patient denies N/V/F/C/SOB. Patient denies calf pain, thigh pain, chest pain. Review of Systems: A 12 point review of symptoms is unremarkable with the exception of the chief complaint. Patient specifically denies nausea, fever, vomiting, chills, shortness of breath, chest pain, abdominal pain, constipation or difficulty urinating. Objective     /75   Pulse 80   Temp 98.9 °F (37.2 °C) (Oral)   Resp 18   Ht 5' 2\" (1.575 m)   SpO2 95%   BMI 34.02 kg/m²     I/O:  Intake/Output Summary (Last 24 hours) at 08/15/18 0902  Last data filed at 08/15/18 0559   Gross per 24 hour   Intake              460 ml   Output                0 ml   Net              460 ml              Wt Readings from Last 3 Encounters:   04/30/18 186 lb (84.4 kg)   04/26/18 181 lb (82.1 kg)   04/23/18 181 lb (82.1 kg)       LABS:    Recent Labs      08/14/18   2359  08/15/18   0544   WBC  10.1  8.2   HGB  11.7*  12.1   HCT  34.7*  35.3*   PLT  345  356      Recent Labs      08/15/18   0544   NA  138   K  4.3   CL  99   CO2  31   BUN  22*   CREATININE  1.2*        Recent Labs      08/14/18   2358  08/15/18   0544   PROT  7.0   --    INR   --   0.91           LOWER EXTREMITY EXAMINATION    Dressing to bilateral LE intact. No strikethrough noted to the external dressing. Mild drainage noted to the internal layers of the dressing. VASCULAR: DP and PT pulses are palpable 1/4 b/l. CFT is brisk to remaining digits of b/l feet. Skin temperature is warm to warm from proximal to distal, b/l. Increased focal erythema and calor noted to the dorsal right forefoot. Increased non-pitting edema noted globally to right foot.      NEUROLOGIC: Gross and protective sensation is absent b/l. DERMATOLOGICAL: Erythematous and edematous Right foot extending to the midfoot-Resolving.  Full thickness wound at base of 5th metatarsal head with fibrotic base, hyperkeratotic rim, with mild serous drainage noted, Right foot. Wound measures 4.0 x 2.4 x 0.3 cm Minimal periwound fluctuance noted distally. No purulent drainage noted. Wound located to the plantar aspect of the 1st MPJ, Left foot. Full thickness with hyperkeratotic rim and maceration noted. Wound measures 2.2 x 1.0 x 0.2 cm.  Granular base. Malodor noted. No purulent drainage noted. Xerosis noted b/l. MUSCULOSKELETAL: Pain with palpation to dorsal aspect of foot, where erythema is present. , has improved since admission. Muscle strength testing 5/5 to all compartments of the lower extremity, b/l. 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 IMAGING:  Narrative   History: Concern for osteomyelitis. Right foot infection.       3 views right foot.       FINDINGS: Mineralization appears intact. There is an ulceration along the lateral foot. No periosteal reaction to suggest osteomyelitis. No fracture. No dislocation.           Impression   1. Soft tissue swelling and ulceration along the distal lateral foot. No convincing evidence of osteomyelitis or acute osseous abnormality.      Narrative   History: Baseline evaluation. Possible infection. Previous amputation left foot.       3 views left foot       FINDINGS: Amputation of the first digit to the level of the proximal aspect of the first proximal phalanx. No fracture. No subluxation. Mild soft tissue swelling. Calcaneal enthesopathy.           Impression   1. Previous first digit amputation. No acute osseous abnormality. ASSESSMENT/PLAN  1. Cellulitis, Right foot-Resolving   2. Neuropathic ulceration 2/2 DM,  Carpenter grade II- Left foot  3. Neuropathic ulceration 2/2 DM, Carpenter grade II- Right foot. 4. Diabetes mellitus Type II, uncontrolled. 5. Peripheral neuropathy 2/2 uncontrolled DM  6.  Former smoker-30 pack per year history    -Patient seen and examined at bedside this

## 2018-08-15 NOTE — CONSULTS
Infectious Diseases Inpatient Consult Note    RESIDENT NOTE - reviewed / edited, attending note at bottom    Reason for Consult:   Possible Osteomyelitis of Right Foot   Requesting Physician:   Dr. Roly Gill  Primary Care Physician:  Ksenia Courtney MD  History Obtained From:   Pt, EPIC    Admit Date: 8/14/2018  Hospital Day: 2    CHIEF COMPLAINT:     R foot wound infection    HISTORY OF PRESENT ILLNESS:      Patient is a 58-year-old female with a past medical history of hypertension, asthma, DVT, Type II diabetes mellitus and diabetic neuropathy who [facility] as a direct admit from podiatry clinic for increasing right foot pain x 7 days duration. In clinic, it was noted that the dressing over the patient's foot wound was disheveled and full of animal hair. Patient has a long history of non-compliance with wound care. Patient was admitted and started on Zosyn. CBC showed no leukocytosis. ESR was elevated at 80. We are consulted for possible osteomyelitis of right foot.      Past Medical History:    Past Medical History:   Diagnosis Date    Amenorrhea     Anomalies of nails     Asthma 5/14/04    Athlete's foot 8/2010    Bacterial vaginosis 04/2008    Carpal tunnel syndrome 5/2007    Diabetes mellitus type II 08/2007    Diabetic neuropathy ([address] 8/10    DVT (deep venous thrombosis) ([facility] [address] 3/2004    Dyslipidemia 5/2009    Dyspareunia 05/2009    ETOH abuse 3/04/2007    Feet clawing     HTN (hypertension)     Hx of blood clots     Hyperlipidemia     MRSA (methicillin resistant staph aureus) culture positive 11/06/2017; 11/17/2017    foot; leg     Neuropathy 05/2009    polyneuropathy    Pain, back 04/2008    Pain, eye, right 5/14/04    Pancreatitis 5/14/04    Pseudocyst, pancreas 5/14/04    Scalp lesion 08/2007    Tinea pedis     Tobacco abuse 03/2008    Vaginal bleeding, abnormal 6/2007       Past Surgical History:    Past Surgical History:   Procedure Laterality

## 2018-08-15 NOTE — PROGRESS NOTES
Clinical Pharmacy Progress Note  Medication History     Admit Date: 8/14/2018    List of of current medications patient is taking is complete. Home Medication list in EPIC updated to reflect changes noted below. Source of information: Rx fill history and patient/daughter    Patient's home pharmacy: [place] - [phone]     Changes made to medication list:   Medication doses adjusted:    ASA - pt takes 325 mg daily, not 81 mg    Other notes:    Verified that patient takes methadone 146 mg PO daily - given through [facility] ([phone]). Also takes gabapentin 800 mg 4x daily and amitriptyline 100 mg QHS. Please call with any questions.   Darcy Stern, PharmD, BCPS  Wireless # [phone]  8/15/2018 8:54 AM

## 2018-08-15 NOTE — CONSULTS
HISTORY Left 05/25/2016    I & D left foot    OTHER SURGICAL HISTORY Right 10/20/2017    RIGHT GASTROC LENGTHENING ENDOSCOPIC, INJECTION OF AMNI GRAFT    OTHER SURGICAL HISTORY Right 04/26/2018    Diabetic foot ulcer I&D w/ integra graft application    PRE-MALIGNANT / BENIGN SKIN LESION EXCISION  7/7003    cryotherapy done on lesion    TOE AMPUTATION Left 02/24/2017    AMPUTATION LEFT GREAT TOE                    Medications Prior to Admission:      Prior to Admission medications    Medication Sig Start Date End Date Taking? Authorizing Provider   amitriptyline (ELAVIL) 100 MG tablet Take 1 tablet by mouth nightly 8/6/18   Kim Stanley MD   gabapentin (NEURONTIN) 800 MG tablet Take 1 tablet by mouth 4 times daily for 90 days. . 8/1/18 10/30/18  Joy Briscoe MD   blood glucose test strips (TRUE METRIX BLOOD GLUCOSE TEST) strip 1 each by In Vitro route 2 times daily As needed.  7/23/18   Kalani Izaguirre MD   Lancets MISC 1 each by Does not apply route 2 times daily PHARMACY MAY SUBSTITUTE TO TRUE METRIX LANCETS 7/23/18   Kalani Izaguirre MD   insulin glargine [facility]) 100 UNIT/ML injection pen Inject 50 Units into the skin nightly 6/7/18   Nicole Griffiths MD   nicotine (NICODERM CQ) 14 MG/24HR Place 1 patch onto the skin every 24 hours 6/5/18   Jason Fernandez DPM   omeprazole (PRILOSEC) 20 MG delayed release capsule Take 1 capsule by mouth Daily 5/21/18   Jose Maria Parikh MD   atorvastatin (LIPITOR) 20 MG tablet Take 1 tablet by mouth daily 5/21/18   Juan F Carter MD   aspirin EC 81 MG EC tablet Take 1 tablet by mouth daily 5/11/18   Aleks Desai MD   carvedilol (COREG) 6.25 MG tablet Take 1 tablet by mouth 2 times daily 5/11/18   Aleks Desai MD   furosemide (LASIX) 40 MG tablet Take 1 tablet by mouth 2 times daily 5/11/18   Aleks Desai MD   metFORMIN (GLUCOPHAGE) 500 MG tablet Take 2 tablets by mouth 2 times daily (with meals) 4/30/18   Frederick Goodwin MD   Gauze Pads & Dressings MISC LOWER EXTREMITY ARTERIES BILATERAL    (Results Pending)       ASSESSMENT & PLAN:  Marlon Barrett is a 52 y.o. female w/ PMH of DM2, diabetic neuropathy, HTN, DVT and asthma who presented to [facility]. as a direct admit for increasing right foot pain of one week duration. Active Hospital Problems    Diagnosis Date Noted    Cellulitis of right foot [V87.732] 08/14/2018     Diabetes Mellitus, Type 2  Chronic diabetic patient with non-compliance, could not recall her last morning blood glucose level.  Presented with cellulitis of R foot 2/2 to diabetic neuropathic ulcer 2/2 to uncontrolled DM2.  -Blood glucose check q4hrs  -Check HgbA1c level  -Hold home metformin  -Start insulin, sliding scale  -Start on carb control diet    Cellulitis of Right foot  -Patient is a direct admit from podiatry clinic  -F/U labs: CBC, BMP, ESR, CRP, A1c  -X-rays showed no indication of osteomyelitis or acute osseous abnormality  -F/U arterial duplex study  -Continue IV Vanc/Zosyn  -Get blood and wound cultures    DVT Prophylaxis: SCDs  Diet: DIET CARB CONTROL;  Code Status: Full Code    PT/OT Eval Status: Upon D/C    Dispo - Medicine Floor    I will discuss the patient with the senior resident and Ashley Burleson, [facility], [id number] [facility]

## 2018-08-16 ENCOUNTER — APPOINTMENT (OUTPATIENT)
Dept: NUCLEAR MEDICINE | Age: 53
DRG: 617 | End: 2018-08-16
Attending: PODIATRIST
Payer: MEDICARE

## 2018-08-16 ENCOUNTER — APPOINTMENT (OUTPATIENT)
Dept: MRI IMAGING | Age: 53
DRG: 617 | End: 2018-08-16
Attending: PODIATRIST
Payer: MEDICARE

## 2018-08-16 LAB
ANION GAP SERPL CALCULATED.3IONS-SCNC: 11 MMOL/L (ref 3–16)
BASOPHILS ABSOLUTE: 0 K/UL (ref 0–0.2)
BASOPHILS RELATIVE PERCENT: 0.5 %
BUN BLDV-MCNC: 31 MG/DL (ref 7–20)
CALCIUM SERPL-MCNC: 9.8 MG/DL (ref 8.3–10.6)
CHLORIDE BLD-SCNC: 95 MMOL/L (ref 99–110)
CO2: 29 MMOL/L (ref 21–32)
CREAT SERPL-MCNC: 1.6 MG/DL (ref 0.6–1.1)
EOSINOPHILS ABSOLUTE: 0.2 K/UL (ref 0–0.6)
EOSINOPHILS RELATIVE PERCENT: 2 %
ESTIMATED AVERAGE GLUCOSE: 289.1 MG/DL
GFR AFRICAN AMERICAN: 40
GFR NON-AFRICAN AMERICAN: 33
GLUCOSE BLD-MCNC: 183 MG/DL (ref 70–99)
GLUCOSE BLD-MCNC: 197 MG/DL (ref 70–99)
GLUCOSE BLD-MCNC: 282 MG/DL (ref 70–99)
GLUCOSE BLD-MCNC: 328 MG/DL (ref 70–99)
GLUCOSE BLD-MCNC: 359 MG/DL (ref 70–99)
HBA1C MFR BLD: 11.7 %
HCT VFR BLD CALC: 36.8 % (ref 36–48)
HEMOGLOBIN: 12.2 G/DL (ref 12–16)
INR BLD: 0.91 (ref 0.86–1.14)
LV EF: 63 %
LVEF MODALITY: NORMAL
LYMPHOCYTES ABSOLUTE: 2.2 K/UL (ref 1–5.1)
LYMPHOCYTES RELATIVE PERCENT: 20.5 %
MCH RBC QN AUTO: 30 PG (ref 26–34)
MCHC RBC AUTO-ENTMCNC: 33.2 G/DL (ref 31–36)
MCV RBC AUTO: 90.2 FL (ref 80–100)
MONOCYTES ABSOLUTE: 0.6 K/UL (ref 0–1.3)
MONOCYTES RELATIVE PERCENT: 5.9 %
NEUTROPHILS ABSOLUTE: 7.6 K/UL (ref 1.7–7.7)
NEUTROPHILS RELATIVE PERCENT: 71.1 %
PDW BLD-RTO: 14.9 % (ref 12.4–15.4)
PERFORMED ON: ABNORMAL
PLATELET # BLD: 323 K/UL (ref 135–450)
PMV BLD AUTO: 8.2 FL (ref 5–10.5)
POTASSIUM SERPL-SCNC: 5.4 MMOL/L (ref 3.5–5.1)
PROTHROMBIN TIME: 10.4 SEC (ref 9.8–13)
RBC # BLD: 4.08 M/UL (ref 4–5.2)
SEDIMENTATION RATE, ERYTHROCYTE: 96 MM/HR (ref 0–30)
SODIUM BLD-SCNC: 135 MMOL/L (ref 136–145)
VANCOMYCIN TROUGH: 26.2 UG/ML (ref 10–20)
WBC # BLD: 10.7 K/UL (ref 4–11)

## 2018-08-16 PROCEDURE — 3430000000 HC RX DIAGNOSTIC RADIOPHARMACEUTICAL: Performed by: INTERNAL MEDICINE

## 2018-08-16 PROCEDURE — 36415 COLL VENOUS BLD VENIPUNCTURE: CPT

## 2018-08-16 PROCEDURE — 73723 MRI JOINT LWR EXTR W/O&W/DYE: CPT

## 2018-08-16 PROCEDURE — 6360000002 HC RX W HCPCS: Performed by: INTERNAL MEDICINE

## 2018-08-16 PROCEDURE — 6370000000 HC RX 637 (ALT 250 FOR IP): Performed by: INTERNAL MEDICINE

## 2018-08-16 PROCEDURE — 80202 ASSAY OF VANCOMYCIN: CPT

## 2018-08-16 PROCEDURE — 85025 COMPLETE CBC W/AUTO DIFF WBC: CPT

## 2018-08-16 PROCEDURE — 2580000003 HC RX 258: Performed by: STUDENT IN AN ORGANIZED HEALTH CARE EDUCATION/TRAINING PROGRAM

## 2018-08-16 PROCEDURE — 6360000002 HC RX W HCPCS: Performed by: STUDENT IN AN ORGANIZED HEALTH CARE EDUCATION/TRAINING PROGRAM

## 2018-08-16 PROCEDURE — 6370000000 HC RX 637 (ALT 250 FOR IP): Performed by: STUDENT IN AN ORGANIZED HEALTH CARE EDUCATION/TRAINING PROGRAM

## 2018-08-16 PROCEDURE — A9579 GAD-BASE MR CONTRAST NOS,1ML: HCPCS | Performed by: STUDENT IN AN ORGANIZED HEALTH CARE EDUCATION/TRAINING PROGRAM

## 2018-08-16 PROCEDURE — 2580000003 HC RX 258: Performed by: INTERNAL MEDICINE

## 2018-08-16 PROCEDURE — APPSS30 APP SPLIT SHARED TIME 16-30 MINUTES: Performed by: NURSE PRACTITIONER

## 2018-08-16 PROCEDURE — 85610 PROTHROMBIN TIME: CPT

## 2018-08-16 PROCEDURE — 80048 BASIC METABOLIC PNL TOTAL CA: CPT

## 2018-08-16 PROCEDURE — 1200000000 HC SEMI PRIVATE

## 2018-08-16 PROCEDURE — A9502 TC99M TETROFOSMIN: HCPCS | Performed by: INTERNAL MEDICINE

## 2018-08-16 PROCEDURE — 99232 SBSQ HOSP IP/OBS MODERATE 35: CPT | Performed by: INTERNAL MEDICINE

## 2018-08-16 PROCEDURE — 6360000004 HC RX CONTRAST MEDICATION: Performed by: STUDENT IN AN ORGANIZED HEALTH CARE EDUCATION/TRAINING PROGRAM

## 2018-08-16 PROCEDURE — 93010 ELECTROCARDIOGRAM REPORT: CPT | Performed by: INTERNAL MEDICINE

## 2018-08-16 PROCEDURE — 78452 HT MUSCLE IMAGE SPECT MULT: CPT

## 2018-08-16 PROCEDURE — 85652 RBC SED RATE AUTOMATED: CPT

## 2018-08-16 PROCEDURE — 99233 SBSQ HOSP IP/OBS HIGH 50: CPT | Performed by: INTERNAL MEDICINE

## 2018-08-16 PROCEDURE — 93017 CV STRESS TEST TRACING ONLY: CPT

## 2018-08-16 RX ORDER — SODIUM CHLORIDE 0.9 % (FLUSH) 0.9 %
10 SYRINGE (ML) INJECTION PRN
Status: DISCONTINUED | OUTPATIENT
Start: 2018-08-16 | End: 2018-08-20 | Stop reason: HOSPADM

## 2018-08-16 RX ORDER — GABAPENTIN 600 MG/1
600 TABLET ORAL 2 TIMES DAILY
Status: DISCONTINUED | OUTPATIENT
Start: 2018-08-16 | End: 2018-08-20 | Stop reason: HOSPADM

## 2018-08-16 RX ORDER — LORAZEPAM 0.5 MG/1
0.5 TABLET ORAL ONCE
Status: DISCONTINUED | OUTPATIENT
Start: 2018-08-16 | End: 2018-08-16

## 2018-08-16 RX ORDER — LINEZOLID 2 MG/ML
600 INJECTION, SOLUTION INTRAVENOUS EVERY 12 HOURS
Status: DISCONTINUED | OUTPATIENT
Start: 2018-08-16 | End: 2018-08-17

## 2018-08-16 RX ADMIN — PIPERACILLIN SODIUM AND TAZOBACTAM SODIUM 3.38 G: 3; .375 INJECTION, POWDER, LYOPHILIZED, FOR SOLUTION INTRAVENOUS at 21:07

## 2018-08-16 RX ADMIN — PIPERACILLIN SODIUM AND TAZOBACTAM SODIUM 3.38 G: 3; .375 INJECTION, POWDER, LYOPHILIZED, FOR SOLUTION INTRAVENOUS at 02:58

## 2018-08-16 RX ADMIN — ENOXAPARIN SODIUM 40 MG: 40 INJECTION SUBCUTANEOUS at 13:23

## 2018-08-16 RX ADMIN — PIPERACILLIN SODIUM AND TAZOBACTAM SODIUM 3.38 G: 3; .375 INJECTION, POWDER, LYOPHILIZED, FOR SOLUTION INTRAVENOUS at 13:24

## 2018-08-16 RX ADMIN — INSULIN LISPRO 5 UNITS: 100 INJECTION, SOLUTION INTRAVENOUS; SUBCUTANEOUS at 14:01

## 2018-08-16 RX ADMIN — CARVEDILOL 6.25 MG: 6.25 TABLET, FILM COATED ORAL at 13:42

## 2018-08-16 RX ADMIN — HYDROCODONE BITARTRATE AND ACETAMINOPHEN 1 TABLET: 5; 325 TABLET ORAL at 21:08

## 2018-08-16 RX ADMIN — INSULIN LISPRO 3 UNITS: 100 INJECTION, SOLUTION INTRAVENOUS; SUBCUTANEOUS at 14:00

## 2018-08-16 RX ADMIN — LINEZOLID 600 MG: 600 INJECTION, SOLUTION INTRAVENOUS at 13:24

## 2018-08-16 RX ADMIN — INSULIN LISPRO 5 UNITS: 100 INJECTION, SOLUTION INTRAVENOUS; SUBCUTANEOUS at 17:06

## 2018-08-16 RX ADMIN — REGADENOSON 0.4 MG: 0.08 INJECTION, SOLUTION INTRAVENOUS at 11:55

## 2018-08-16 RX ADMIN — TETROFOSMIN 30 MILLICURIE: 1.38 INJECTION, POWDER, LYOPHILIZED, FOR SOLUTION INTRAVENOUS at 11:55

## 2018-08-16 RX ADMIN — CARVEDILOL 6.25 MG: 6.25 TABLET, FILM COATED ORAL at 17:02

## 2018-08-16 RX ADMIN — INSULIN LISPRO 1 UNITS: 100 INJECTION, SOLUTION INTRAVENOUS; SUBCUTANEOUS at 17:06

## 2018-08-16 RX ADMIN — INSULIN GLARGINE 40 UNITS: 100 INJECTION, SOLUTION SUBCUTANEOUS at 20:51

## 2018-08-16 RX ADMIN — INSULIN LISPRO 1 UNITS: 100 INJECTION, SOLUTION INTRAVENOUS; SUBCUTANEOUS at 20:52

## 2018-08-16 RX ADMIN — ATORVASTATIN CALCIUM 20 MG: 20 TABLET, FILM COATED ORAL at 13:43

## 2018-08-16 RX ADMIN — GADOTERIDOL 15 ML: 279.3 INJECTION, SOLUTION INTRAVENOUS at 16:30

## 2018-08-16 RX ADMIN — GABAPENTIN 600 MG: 600 TABLET, FILM COATED ORAL at 20:50

## 2018-08-16 RX ADMIN — Medication 10 ML: at 20:49

## 2018-08-16 RX ADMIN — FUROSEMIDE 40 MG: 40 TABLET ORAL at 16:52

## 2018-08-16 RX ADMIN — GABAPENTIN 600 MG: 600 TABLET, FILM COATED ORAL at 13:23

## 2018-08-16 RX ADMIN — Medication 10 ML: at 11:55

## 2018-08-16 RX ADMIN — AMITRIPTYLINE HYDROCHLORIDE 100 MG: 50 TABLET, FILM COATED ORAL at 20:50

## 2018-08-16 RX ADMIN — TETROFOSMIN 10 MILLICURIE: 1.38 INJECTION, POWDER, LYOPHILIZED, FOR SOLUTION INTRAVENOUS at 07:47

## 2018-08-16 RX ADMIN — METHADONE HYDROCHLORIDE 140 MG: 10 TABLET ORAL at 13:37

## 2018-08-16 RX ADMIN — Medication 10 ML: at 07:48

## 2018-08-16 ASSESSMENT — PAIN DESCRIPTION - ONSET
ONSET: ON-GOING
ONSET: ON-GOING

## 2018-08-16 ASSESSMENT — PAIN DESCRIPTION - DESCRIPTORS
DESCRIPTORS: ACHING
DESCRIPTORS: SHARP;STABBING;THROBBING

## 2018-08-16 ASSESSMENT — PAIN DESCRIPTION - ORIENTATION
ORIENTATION: RIGHT
ORIENTATION: RIGHT;LEFT;LOWER

## 2018-08-16 ASSESSMENT — PAIN SCALES - GENERAL
PAINLEVEL_OUTOF10: 0
PAINLEVEL_OUTOF10: 0
PAINLEVEL_OUTOF10: 7
PAINLEVEL_OUTOF10: 6
PAINLEVEL_OUTOF10: 0

## 2018-08-16 ASSESSMENT — PAIN DESCRIPTION - FREQUENCY
FREQUENCY: CONTINUOUS
FREQUENCY: CONTINUOUS

## 2018-08-16 ASSESSMENT — PAIN DESCRIPTION - PROGRESSION
CLINICAL_PROGRESSION: NOT CHANGED
CLINICAL_PROGRESSION: NOT CHANGED

## 2018-08-16 ASSESSMENT — PAIN DESCRIPTION - PAIN TYPE
TYPE: ACUTE PAIN
TYPE: CHRONIC PAIN

## 2018-08-16 ASSESSMENT — ACTIVITIES OF DAILY LIVING (ADL)
EFFECT OF PAIN ON DAILY ACTIVITIES: DISCOMFORT
EFFECT OF PAIN ON DAILY ACTIVITIES: DISCOMFORT

## 2018-08-16 ASSESSMENT — PAIN DESCRIPTION - LOCATION
LOCATION: FOOT
LOCATION: ARM;FOOT

## 2018-08-16 NOTE — PLAN OF CARE
Problem: Pain:  Goal: Pain level will decrease  Pain level will decrease   Outcome: Ongoing      Problem: Falls - Risk of:  Goal: Will remain free from falls  Will remain free from falls   Outcome: Ongoing  Pt is aware of abilities and uses call light appropriately for needs. Bedside table, call light and needs within reach. Will continue to round on pt for safety/needs.      Problem: Nutrition  Goal: Optimal nutrition therapy  Outcome: Ongoing

## 2018-08-16 NOTE — PROGRESS NOTES
hallux stump with dehiscence, warmth  MRI with 'subtle . .. signal alteration', Culture - G     Admit 8/14 from podiatry clinic with R foot pain. ESR 80, CRP 21.4. Wound cult sent - GS 2+GNR, 1+GPC. R foot x-ray - no OM.     Started on zosyn + vancomycin     IMP/  DM, neuropathy, CKD  Venous stasis with LE edema, less swelling  Hx DFI with R hallux / 2nd toe OM (see x-ray / MRI results, )     L foot ulcer with poss extension OM (reviewed image in podiatry H&P     REC/  Cont zosyn + linezolid  Will f/u on wound cult results   MRI R foot reordered (had motion artifact on MRI)  Wound care and any surgical intervention per Podiatry     Discussed with pt  Velvet Shelby MD

## 2018-08-16 NOTE — PROGRESS NOTES
Spoke with DR Matthew Murcia R/T pt AM med's and order for Ativan stated to give pt her Methadone and hold off on her Ativan , pt stated feels the methadone will help her relax enough.

## 2018-08-16 NOTE — PROGRESS NOTES
Topical BID    vancomycin  1,000 mg Intravenous Q12H    methadone  140 mg Oral Daily    gabapentin  600 mg Oral TID    piperacillin-tazobactam  3.375 g Intravenous Q8H    enoxaparin  40 mg Subcutaneous Daily    amitriptyline  100 mg Oral Nightly    atorvastatin  20 mg Oral Daily    carvedilol  6.25 mg Oral BID WC    furosemide  40 mg Oral BID    insulin glargine  40 Units Subcutaneous Nightly     PRN Meds: ammonium lactate, HYDROcodone 5 mg - acetaminophen, naloxone, diphenhydrAMINE, acetaminophen, docusate sodium, ondansetron, promethazine, glucose, glucagon (rDNA), dextrose      Intake/Output Summary (Last 24 hours) at 08/16/18 0623  Last data filed at 08/15/18 1606   Gross per 24 hour   Intake             1200 ml   Output                0 ml   Net             1200 ml       Physical Exam Performed:    /82   Pulse 100   Temp 98.9 °F (37.2 °C) (Oral)   Resp 16   Ht 5' 2\" (1.575 m)   Wt 183 lb 8 oz (83.2 kg)   SpO2 94%   BMI 33.56 kg/m²     General appearance: No apparent distress. HEENT: Conjunctivae/corneas clear. Neck: No jugular venous distention. Trachea midline. Respiratory:  Normal respiratory effort. CTAB without Rales/Wheezes/Rhonchi. Cardiovascular: RRR with normal S1/S2 without murmurs, rubs or gallops. Abdomen: Soft, non-tender, non-distended with normal bowel sounds. Musculoskeletal: No clubbing, cyanosis or edema bilaterally. Right foot bandaged. Skin: Skin color, texture, turgor normal.  No rashes or lesions.   Psychiatric: Alert and oriented, thought content appropriate, normal insight  Capillary Refill: Brisk,< 3 seconds   Peripheral Pulses: +2 palpable, equal bilaterally       Labs:   Recent Labs      08/14/18   2359  08/15/18   0544   WBC  10.1  8.2   HGB  11.7*  12.1   HCT  34.7*  35.3*   PLT  345  356     Recent Labs      08/14/18   2358  08/15/18   0544   NA  134*  138   K  4.9  4.3   CL  94*  99   CO2  30  31   BUN  21*  22*   CREATININE  1.4*  1.2*   CALCIUM  9.5 meets stop-bang criteria (snoring, tired during day, observed apnea, HTN, BMI >35, age>50)  - consider polysomnography as an outpatient    DVT Prophylaxis: SCD; lovenox  Diet: Diet NPO Effective Now  Code Status: Full Code    Dispo - GMF    Latosha Pradhan MD    I will discuss the patient with the senior resident and my attending MD Latosha Anglin MD  Internal Medicine Resident PGY-1  Pager: [phone]    Patient seen and examined, labs and imaging studies reviewed, agree with assessment and plan as outlined above. Continue with current care and plan as outlined above.       Lynda Louise MD [facility]

## 2018-08-16 NOTE — PROGRESS NOTES
Spoke with DR Buzz Adams about pt unable to do MRI, and the Methadone not holding her as pt thought. Notified MRI will try tomorrow, stated  and will let day team reorder Ativan if they want it tomorrow.

## 2018-08-16 NOTE — PROGRESS NOTES
Clinical Pharmacy Consult Note    ADMIT DATE: 8/14/2018     Subjective / Objective:  Pt is a 51 yo F with PMHx significant for DM2, diabetic neuropathy, HTN, DVT, and obesity who is admitted with cellulitis of R foot and bilateral foot ulcers. Interval update:  MRI pending to r/o OM. ID planning to change Vancomycin to Linezolid. SCr up slightly today to 1.6. Pharmacy is consulted to dose vancomycin per Dr. Iris Carvalho. Pharmacy has been consulted to make pain medication recommendations by Dr. Iirs Carvalho. Current antibiotics:  Zosyn 3.375g IV q8h (4-hr inf) -- day #2  Vancomycin -- pharmacy to dose -- day #2    8/14   1g IV q12h (8/15 - current)    Pain scores have been 6-7/10 past 24 hrs. Home dose of Methadone restarted 8/15. Has required just 2 doses of Norco for breakthrough pain control in the past 24 hrs. Current pain regimen:   Medication  Home med? Amount used yesterday    Amitriptyline 100 mg QHS Yes 1 dose   Hydrocodone-APAP 5/325 mg 1 tab q6h PRN pain No 2 doses (10mg total)   Methadone 140mg po daily Yes 1 dose     LABORATORY:  Recent Labs      08/15/18   0544  08/16/18   0652   NA  138  135*   K  4.3  5.4*   CL  99  95*   CO2  31  29   BUN  22*  31*   CREATININE  1.2*  1.6*   GLUCOSE  346*  359*     Estimated Creatinine Clearance: 40 mL/min (A) (based on SCr of 1.6 mg/dL (H)). Recent Labs      08/15/18   0544  08/16/18   0652   WBC  8.2  10.7   HGB  12.1  12.2   PLT  356  323     Vancomycin Levels:  Trough  8/16 @ 06:53 = 26.2mcg/mL (drawn ~7.5 hr after prior dose, on 1g IV q12h)    Cultures:  Wound (8/15) = pending    Prophylaxis:  Stress ulcer: not indicated  VTE:  Enoxaparin    ASSESSMENT/PLAN:  1)  Cellulitis, R foot:   Zosyn + Vancomycin, Day #2  · Vancomycin - Pharmacy to dose  · Trough drawn ~4.5hr early this AM returned at 26.2. However, SCr also increased today to 1.6 and actual 12-hr trough likely still slightly elevated.     · Will hold dose due 11am today, and will restart tonight at lower dose of 1.25g IV q24h. · Per ID - planning to possibly change to Linezolid given high risk of FAHEEM (and now with SCr bump) in this pt. Agree with change. · Renal function will be monitored closely, and repeat levels will be ordered & dose adjustments made as appropriate. Nahid Braxton 2)  Pain Management:   · Methadone restarted at home dose yesterday - recommend continuing same. · Patient has reported some pain (score 6-7) over past 24 hrs. Has used just 2 doses of Norco-5 (1 tab each time) in the past 24 hrs. Recommend continuing same regimen for now.       Please call with questions--  Thanks--  Denae Johnson, PharmD, [facility], [address] or [phone] (wireless)  8/16/2018 9:09 AM

## 2018-08-16 NOTE — PROGRESS NOTES
MRI called and stated pt unable to get MRI, nurse stated we can try Ativan , replay was we can not send her up to just sent her back down, paged DR Claudia Ramos waiting on return call.

## 2018-08-16 NOTE — PROGRESS NOTES
Podiatric Surgery Daily Progress Note  Scarlett Cueva  Subjective :   Patient seen and examined this am at the bedside. Patient denies any acute overnight events. Patient states she still has some pain to touch on the top of her Right foot. Patient denies N/V/F/C/SOB. Patient denies calf pain, thigh pain, chest pain. Patient states MRI was getting done yesterday, but was not able to be completed due to her moving. She states that she was having some \"back pain\". Review of Systems: A 12 point review of symptoms is unremarkable with the exception of the chief complaint. Patient specifically denies nausea, fever, vomiting, chills, shortness of breath, chest pain, abdominal pain, constipation or difficulty urinating. Objective     /82   Pulse 100   Temp 98.9 °F (37.2 °C) (Oral)   Resp 16   Ht 5' 2\" (1.575 m)   Wt 183 lb 8 oz (83.2 kg)   SpO2 94%   BMI 33.56 kg/m²     I/O:    Intake/Output Summary (Last 24 hours) at 08/16/18 0939  Last data filed at 08/15/18 1606   Gross per 24 hour   Intake              960 ml   Output                0 ml   Net              960 ml              Wt Readings from Last 3 Encounters:   08/15/18 183 lb 8 oz (83.2 kg)   04/30/18 186 lb (84.4 kg)   04/26/18 181 lb (82.1 kg)       LABS:    Recent Labs      08/15/18   0544  08/16/18   0652   WBC  8.2  10.7   HGB  12.1  12.2   HCT  35.3*  36.8   PLT  356  323        Recent Labs      08/16/18   0652   NA  135*   K  5.4*   CL  95*   CO2  29   BUN  31*   CREATININE  1.6*        Recent Labs      08/14/18   2358  08/15/18   0544  08/16/18   0653   PROT  7.0   --    --    INR   --   0.91  0.91           LOWER EXTREMITY EXAMINATION    Dressing to bilateral LE intact. No strikethrough noted to the external dressing. Mild drainage noted to the internal layers of the dressing. VASCULAR: DP and PT pulses are palpable 1/4 b/l. CFT is brisk to remaining digits of b/l feet. Skin temperature is warm to warm from proximal to distal, b/l. Increased focal erythema and calor noted to the dorsal right forefoot. Non-pitting edema noted globally to right foot.      NEUROLOGIC: Gross and protective sensation is absent b/l. DERMATOLOGICAL: Dorsally mild fluctuance/firmness noted where erythema is present on the top of the Right foot. Erythematous and edematous Right foot extending to the midfoot. Full thickness wound at base of 5th metatarsal head with fibrotic base, hyperkeratotic rim, with minimal serous drainage noted, Right foot. Wound measures 4.0 x 2.4 x 0.3 cm Minimal periwound fluctuance noted distally. No purulent drainage noted. Wound located to the plantar aspect of the 1st MPJ, Left foot. Full thickness with hyperkeratotic rim and maceration noted. Wound measures 2.2 x 1.0 x 0.2 cm.  Granular base. No malodor noted. No purulent drainage noted. Xerosis noted b/l. MUSCULOSKELETAL: Pain with palpation to dorsal aspect of foot, where erythema is present. , has improved since admission. Muscle strength testing 5/5 to all compartments of the lower extremity, b/l. 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 IMAGING:  Narrative   History: Concern for osteomyelitis. Right foot infection.       3 views right foot.       FINDINGS: Mineralization appears intact. There is an ulceration along the lateral foot. No periosteal reaction to suggest osteomyelitis. No fracture. No dislocation.           Impression   1. Soft tissue swelling and ulceration along the distal lateral foot. No convincing evidence of osteomyelitis or acute osseous abnormality.      Narrative   History: Baseline evaluation. Possible infection. Previous amputation left foot.       3 views left foot       FINDINGS: Amputation of the first digit to the level of the proximal aspect of the first proximal phalanx. No fracture. No subluxation. Mild soft tissue swelling. Calcaneal enthesopathy.           Impression   1. Previous first digit amputation.  No acute osseous culture results   -Medicine onboard; recs appreciated  -ID onboard; recs appreciated  -Cardiology onboard; recs appreciated    DISPO: Cellulitis, Right foot-resolving. Will F/U arterial duplex and MRI to guide further treatment during this admission. Patient will follow up in outpatient clinic upon discharge.      Diet: Carb Control  DVT Prophylaxis: Lovenox  Abx: Linezolid/Zosyn      Discussed assessment and plan with Dr. Margie Holter, DPM    Jose Paula, PGY-1  Pager: [phone]

## 2018-08-16 NOTE — PROGRESS NOTES
Patient has been on gabapentin 600 mg TID. Renal function has declined, and gabapentin requires renal dosing. Per protocol, will change dose to 600 mg BID. Estimated Creatinine Clearance: 40 mL/min (A) (based on SCr of 1.6 mg/dL (H)).   200 - 700 mg BID is appropriate for CrCl 31-59 ml/min    Pharmacy will continue to monitor renal function and adjust dose as necessary. Please call with any questions.       Marleen Forbes [facility]   8/16/2018 3:21 PM

## 2018-08-16 NOTE — PROGRESS NOTES
(NEURONTIN) tablet 600 mg  600 mg Oral TID Dimple Paul MD   600 mg at 08/16/18 1323    piperacillin-tazobactam (ZOSYN) 3.375 g in dextrose 5 % 100 mL IVPB extended infusion (mini-bag)  3.375 g Intravenous Q8H Brooklyn Johnny, DPM 25 mL/hr at 08/16/18 1324 3.375 g at 08/16/18 1324    HYDROcodone-acetaminophen (NORCO) 5-325 MG per tablet 1 tablet  1 tablet Oral Q6H PRN Brooklyn Johnny, DPM   1 tablet at 08/15/18 1757    enoxaparin (LOVENOX) injection 40 mg  40 mg Subcutaneous Daily Brooklyn Johnny, DPM   40 mg at 08/16/18 1323    naloxone (NARCAN) injection 0.4 mg  0.4 mg Intravenous PRN Brooklyn Johnny, DPM        diphenhydrAMINE (BENADRYL) injection 25 mg  25 mg Intravenous Q6H PRN Brooklyn Johnny, DPM        acetaminophen (TYLENOL) tablet 500 mg  500 mg Oral Q6H PRN Brooklyn Johnny, DPM        docusate sodium (COLACE) capsule 100 mg  100 mg Oral BID PRN Brooklyn Johnny, DPM        ondansetron (ZOFRAN) injection 4 mg  4 mg Intravenous Q6H PRN Brooklyn Johnny, DPM        promethazine (PHENERGAN) tablet 12.5 mg  12.5 mg Oral Q6H PRN Brooklyn Johnny, DPM        glucose (GLUTOSE) 40 % oral gel 15 g  15 g Oral PRN Brooklyn Johnny, DPM        glucagon (rDNA) injection 1 mg  1 mg Intramuscular PRN Brooklyn Jhonny, DPM        dextrose 5 % solution  100 mL/hr Intravenous PRN Brooklyn Johnny, DPM        amitriptyline (ELAVIL) tablet 100 mg  100 mg Oral Nightly Esthela Bai MD   100 mg at 08/15/18 2111    atorvastatin (LIPITOR) tablet 20 mg  20 mg Oral Daily Esthela Bai MD   20 mg at 08/15/18 0943    carvedilol (COREG) tablet 6.25 mg  6.25 mg Oral BID  Esthela Bai MD   6.25 mg at 08/15/18 1748    furosemide (LASIX) tablet 40 mg  40 mg Oral BID Esthela Bai MD   40 mg at 08/15/18 1748    insulin glargine (LANTUS) injection pen 40 Units  40 Units Subcutaneous Nightly Esthela Bai MD   40 Units at 08/15/18 0204       Allergies:  Sulfa antibiotics     Social hx     Objective:   BP (!) 181/84   Pulse 89 Temp 99.4 °F (37.4 °C) (Oral)   Resp 16   Ht 5' 2\" (1.575 m)   Wt 183 lb 8 oz (83.2 kg)   SpO2 95%   BMI 33.56 kg/m²     Intake/Output Summary (Last 24 hours) at 08/16/18 1340  Last data filed at 08/15/18 1606   Gross per 24 hour   Intake              480 ml   Output                0 ml   Net              480 ml       · ROS:   · Cardiovascular: Reviewed in HPI  · Allergic/Immunologic: No nasal congestion or hives. · All other ROS are reviewed and are unremarkable.     Physical Exam:  General:  Awake, alert, NAD  Skin:  Warm and dry  Chest:  Clear to auscultation, respiration easy  Cardiovascular:  RRR S1S2    Reviewed all lab and diagnostic testing      Patient Active Problem List    Diagnosis Date Noted    Open wound of left foot 05/22/2014     Priority: High     Class: Chronic    Acute systolic congestive heart failure (HCC)     Cellulitis of right foot 08/14/2018    Diabetic ulcer of right foot associated with type 2 diabetes mellitus, with fat layer exposed ([facility] [address] 07/17/2018    Open wound of right foot 07/03/2018    Cellulitis 03/27/2018    Diabetic foot infection ([facility] [address] 03/27/2018    Bilateral lower extremity edema 03/27/2018    Chronic multifocal osteomyelitis of left foot ([facility] [address] 03/27/2018    Open wound of left foot with complication 75/34/4762    Post-op pain 10/30/2017    Pain medication agreement signed 06/22/2017    Diabetic ulcer of left foot associated with type 2 diabetes mellitus ([facility] [address] 02/22/2016    Obesity (BMI 30-39.9) 09/23/2015    Burn 01/05/2015    Tinea pedis 08/22/2013    Depressive disorder, not elsewhere classified 05/17/2013    Anxiety state 05/17/2013    Onychomycosis 11/15/2012    Hypoxia 07/21/2011    Acute pancreatitis 07/16/2011    Pneumonia 07/15/2011    Nicotine addiction 07/15/2011    Feet clawing     Diabetic neuropathy (HCC)     Tinea pedis     HTN (hypertension)     Amenorrhea     Anomalies of nails     Dyslipidemia 05/01/2009    Dyspareunia 05/01/2009    Neuropathy 05/01/2009    Bacterial vaginosis 04/01/2008    Pain in back 04/01/2008    Tobacco abuse 03/01/2008    Scalp lesion 08/01/2007    Diabetes mellitus, type II ([facility] [address] 08/01/2007    Vaginal bleeding, abnormal 06/01/2007    Carpal tunnel syndrome 05/01/2007    ETOH abuse 03/04/2007    Pseudocyst, pancreas 05/14/2004    Pancreatitis 05/14/2004    Asthma 05/14/2004    Pain, eye, right 05/14/2004    DVT (deep venous thrombosis) ([facility] [address] 03/01/2004      Assessment     admit with cellulitis of R foot and bilateral foot ulcers. cardiology consult to manage cardiac care  / no hx CAD   Trop neg x one      Echo 3/2018 EF WNL  Septal wall appears hypokinetic. Normal diastolic  function. Moderate tricuspid regurgitation. The right atrium is dilated.     Hx: DMT2  Per IM     tobacco use   Cessation discussed       diabetic neuropathy  Per IM      HTN  Optimal     Hx DVT     asthma / tobacco use   Cessation    Summary echocardiogram March 29, 2018    Left ventricular cavity size is normal. Normal left ventricular wall   thickness. Left ventricular function is low normal with ejection fraction   estimated at 50-55%. Septal wall appears hypokinetic. Normal diastolic   function. Mitral annular calcification is present. Mild mitral regurgitation   is present. Moderate tricuspid regurgitation. The right atrium is dilated.   Mild pulmonic regurgitation present. Estimated pulmonary artery systolic   pressure is at 65 mmHg assuming a right atrial pressure of 15 mmHg.     IMPRESSION :       Normal myocardial perfusion scan       Ejection fraction 63 %            IMPRESSION : Stress nuclear on 8/16/18        Normal myocardial perfusion scan       Ejection fraction 63 %            Plan:   Stress test today      ALEJANDRO Irvin FNP CVNP  8/16/2018      This patient is awake and alert and doing quite well. Her stress nuclear study was negative for coronary ischemia.   She is asymptomatic from our

## 2018-08-17 ENCOUNTER — ANESTHESIA EVENT (OUTPATIENT)
Dept: OPERATING ROOM | Age: 53
DRG: 617 | End: 2018-08-17
Payer: MEDICARE

## 2018-08-17 ENCOUNTER — ANESTHESIA (OUTPATIENT)
Dept: OPERATING ROOM | Age: 53
DRG: 617 | End: 2018-08-17
Payer: MEDICARE

## 2018-08-17 ENCOUNTER — APPOINTMENT (OUTPATIENT)
Dept: GENERAL RADIOLOGY | Age: 53
DRG: 617 | End: 2018-08-17
Attending: PODIATRIST
Payer: MEDICARE

## 2018-08-17 VITALS — DIASTOLIC BLOOD PRESSURE: 59 MMHG | TEMPERATURE: 98.6 F | SYSTOLIC BLOOD PRESSURE: 107 MMHG | OXYGEN SATURATION: 95 %

## 2018-08-17 LAB
ABO/RH: NORMAL
ANAEROBIC CULTURE: NORMAL
ANAEROBIC CULTURE: NORMAL
ANION GAP SERPL CALCULATED.3IONS-SCNC: 11 MMOL/L (ref 3–16)
ANTIBODY IDENTIFICATION: NORMAL
ANTIBODY SCREEN: NORMAL
BASOPHILS ABSOLUTE: 0 K/UL (ref 0–0.2)
BASOPHILS RELATIVE PERCENT: 0.3 %
BUN BLDV-MCNC: 33 MG/DL (ref 7–20)
CALCIUM SERPL-MCNC: 9.6 MG/DL (ref 8.3–10.6)
CHLORIDE BLD-SCNC: 98 MMOL/L (ref 99–110)
CO2: 28 MMOL/L (ref 21–32)
CREAT SERPL-MCNC: 1.5 MG/DL (ref 0.6–1.1)
CULTURE SURGICAL: NORMAL
CULTURE SURGICAL: NORMAL
DAT IGG CAPTURE: NORMAL
EOSINOPHILS ABSOLUTE: 0.2 K/UL (ref 0–0.6)
EOSINOPHILS RELATIVE PERCENT: 3.1 %
GFR AFRICAN AMERICAN: 44
GFR NON-AFRICAN AMERICAN: 36
GLUCOSE BLD-MCNC: 114 MG/DL (ref 70–99)
GLUCOSE BLD-MCNC: 155 MG/DL (ref 70–99)
GLUCOSE BLD-MCNC: 201 MG/DL (ref 70–99)
GLUCOSE BLD-MCNC: 271 MG/DL (ref 70–99)
GLUCOSE BLD-MCNC: 324 MG/DL (ref 70–99)
HCT VFR BLD CALC: 36.4 % (ref 36–48)
HEMOGLOBIN: 12.3 G/DL (ref 12–16)
INR BLD: 0.9 (ref 0.86–1.14)
LYMPHOCYTES ABSOLUTE: 1.9 K/UL (ref 1–5.1)
LYMPHOCYTES RELATIVE PERCENT: 26.9 %
MCH RBC QN AUTO: 30.1 PG (ref 26–34)
MCHC RBC AUTO-ENTMCNC: 33.7 G/DL (ref 31–36)
MCV RBC AUTO: 89.2 FL (ref 80–100)
MONOCYTES ABSOLUTE: 0.5 K/UL (ref 0–1.3)
MONOCYTES RELATIVE PERCENT: 7.1 %
NEUTROPHILS ABSOLUTE: 4.4 K/UL (ref 1.7–7.7)
NEUTROPHILS RELATIVE PERCENT: 62.6 %
PDW BLD-RTO: 15.1 % (ref 12.4–15.4)
PERFORMED ON: ABNORMAL
PLATELET # BLD: 321 K/UL (ref 135–450)
PMV BLD AUTO: 8.1 FL (ref 5–10.5)
POTASSIUM SERPL-SCNC: 4.3 MMOL/L (ref 3.5–5.1)
PROTHROMBIN TIME: 10.3 SEC (ref 9.8–13)
RBC # BLD: 4.08 M/UL (ref 4–5.2)
SEDIMENTATION RATE, ERYTHROCYTE: 95 MM/HR (ref 0–30)
SODIUM BLD-SCNC: 137 MMOL/L (ref 136–145)
WBC # BLD: 7 K/UL (ref 4–11)

## 2018-08-17 PROCEDURE — 0Y6M0ZF DETACHMENT AT RIGHT FOOT, PARTIAL 5TH RAY, OPEN APPROACH: ICD-10-PCS | Performed by: PODIATRIST

## 2018-08-17 PROCEDURE — 2500000003 HC RX 250 WO HCPCS: Performed by: NURSE ANESTHETIST, CERTIFIED REGISTERED

## 2018-08-17 PROCEDURE — 3700000000 HC ANESTHESIA ATTENDED CARE: Performed by: PODIATRIST

## 2018-08-17 PROCEDURE — 6360000002 HC RX W HCPCS: Performed by: NURSE ANESTHETIST, CERTIFIED REGISTERED

## 2018-08-17 PROCEDURE — 36415 COLL VENOUS BLD VENIPUNCTURE: CPT

## 2018-08-17 PROCEDURE — 88305 TISSUE EXAM BY PATHOLOGIST: CPT

## 2018-08-17 PROCEDURE — 87186 SC STD MICRODIL/AGAR DIL: CPT

## 2018-08-17 PROCEDURE — 6370000000 HC RX 637 (ALT 250 FOR IP): Performed by: STUDENT IN AN ORGANIZED HEALTH CARE EDUCATION/TRAINING PROGRAM

## 2018-08-17 PROCEDURE — 6360000002 HC RX W HCPCS: Performed by: STUDENT IN AN ORGANIZED HEALTH CARE EDUCATION/TRAINING PROGRAM

## 2018-08-17 PROCEDURE — 86880 COOMBS TEST DIRECT: CPT

## 2018-08-17 PROCEDURE — 6370000000 HC RX 637 (ALT 250 FOR IP): Performed by: INTERNAL MEDICINE

## 2018-08-17 PROCEDURE — 85025 COMPLETE CBC W/AUTO DIFF WBC: CPT

## 2018-08-17 PROCEDURE — 3700000001 HC ADD 15 MINUTES (ANESTHESIA): Performed by: PODIATRIST

## 2018-08-17 PROCEDURE — 2580000003 HC RX 258: Performed by: NURSE ANESTHETIST, CERTIFIED REGISTERED

## 2018-08-17 PROCEDURE — 2580000003 HC RX 258: Performed by: PODIATRIST

## 2018-08-17 PROCEDURE — 86922 COMPATIBILITY TEST ANTIGLOB: CPT

## 2018-08-17 PROCEDURE — 6360000002 HC RX W HCPCS: Performed by: PODIATRIST

## 2018-08-17 PROCEDURE — 99233 SBSQ HOSP IP/OBS HIGH 50: CPT | Performed by: NURSE PRACTITIONER

## 2018-08-17 PROCEDURE — 88311 DECALCIFY TISSUE: CPT

## 2018-08-17 PROCEDURE — 3600000012 HC SURGERY LEVEL 2 ADDTL 15MIN: Performed by: PODIATRIST

## 2018-08-17 PROCEDURE — 7100000000 HC PACU RECOVERY - FIRST 15 MIN: Performed by: PODIATRIST

## 2018-08-17 PROCEDURE — 99232 SBSQ HOSP IP/OBS MODERATE 35: CPT | Performed by: INTERNAL MEDICINE

## 2018-08-17 PROCEDURE — 3600000002 HC SURGERY LEVEL 2 BASE: Performed by: PODIATRIST

## 2018-08-17 PROCEDURE — 85652 RBC SED RATE AUTOMATED: CPT

## 2018-08-17 PROCEDURE — 87077 CULTURE AEROBIC IDENTIFY: CPT

## 2018-08-17 PROCEDURE — 2500000003 HC RX 250 WO HCPCS: Performed by: PODIATRIST

## 2018-08-17 PROCEDURE — 86900 BLOOD TYPING SEROLOGIC ABO: CPT

## 2018-08-17 PROCEDURE — 86870 RBC ANTIBODY IDENTIFICATION: CPT

## 2018-08-17 PROCEDURE — 87070 CULTURE OTHR SPECIMN AEROBIC: CPT

## 2018-08-17 PROCEDURE — 73630 X-RAY EXAM OF FOOT: CPT

## 2018-08-17 PROCEDURE — 86901 BLOOD TYPING SEROLOGIC RH(D): CPT

## 2018-08-17 PROCEDURE — 1200000000 HC SEMI PRIVATE

## 2018-08-17 PROCEDURE — 2580000003 HC RX 258: Performed by: STUDENT IN AN ORGANIZED HEALTH CARE EDUCATION/TRAINING PROGRAM

## 2018-08-17 PROCEDURE — C1713 ANCHOR/SCREW BN/BN,TIS/BN: HCPCS | Performed by: PODIATRIST

## 2018-08-17 PROCEDURE — 85610 PROTHROMBIN TIME: CPT

## 2018-08-17 PROCEDURE — 86850 RBC ANTIBODY SCREEN: CPT

## 2018-08-17 PROCEDURE — 86140 C-REACTIVE PROTEIN: CPT

## 2018-08-17 PROCEDURE — 87205 SMEAR GRAM STAIN: CPT

## 2018-08-17 PROCEDURE — 2709999900 HC NON-CHARGEABLE SUPPLY: Performed by: PODIATRIST

## 2018-08-17 PROCEDURE — 7100000001 HC PACU RECOVERY - ADDTL 15 MIN: Performed by: PODIATRIST

## 2018-08-17 PROCEDURE — 80048 BASIC METABOLIC PNL TOTAL CA: CPT

## 2018-08-17 DEVICE — ALLOGRAFT HUM TISS 1 CC AMNION AMNIFLO CRYOPRESERVED: Type: IMPLANTABLE DEVICE | Site: FOOT | Status: FUNCTIONAL

## 2018-08-17 RX ORDER — SODIUM CHLORIDE, SODIUM LACTATE, POTASSIUM CHLORIDE, CALCIUM CHLORIDE 600; 310; 30; 20 MG/100ML; MG/100ML; MG/100ML; MG/100ML
INJECTION, SOLUTION INTRAVENOUS CONTINUOUS PRN
Status: DISCONTINUED | OUTPATIENT
Start: 2018-08-17 | End: 2018-08-17 | Stop reason: SDUPTHER

## 2018-08-17 RX ORDER — PROPOFOL 10 MG/ML
INJECTION, EMULSION INTRAVENOUS CONTINUOUS PRN
Status: DISCONTINUED | OUTPATIENT
Start: 2018-08-17 | End: 2018-08-17 | Stop reason: SDUPTHER

## 2018-08-17 RX ORDER — GLYCOPYRROLATE 0.2 MG/ML
INJECTION INTRAMUSCULAR; INTRAVENOUS PRN
Status: DISCONTINUED | OUTPATIENT
Start: 2018-08-17 | End: 2018-08-17 | Stop reason: SDUPTHER

## 2018-08-17 RX ORDER — LIDOCAINE HYDROCHLORIDE 20 MG/ML
INJECTION, SOLUTION INFILTRATION; PERINEURAL PRN
Status: DISCONTINUED | OUTPATIENT
Start: 2018-08-17 | End: 2018-08-17 | Stop reason: HOSPADM

## 2018-08-17 RX ORDER — ONDANSETRON 2 MG/ML
INJECTION INTRAMUSCULAR; INTRAVENOUS PRN
Status: DISCONTINUED | OUTPATIENT
Start: 2018-08-17 | End: 2018-08-17 | Stop reason: SDUPTHER

## 2018-08-17 RX ORDER — EPHEDRINE SULFATE 50 MG/ML
INJECTION INTRAVENOUS PRN
Status: DISCONTINUED | OUTPATIENT
Start: 2018-08-17 | End: 2018-08-17 | Stop reason: SDUPTHER

## 2018-08-17 RX ORDER — LINEZOLID 600 MG/1
600 TABLET, FILM COATED ORAL EVERY 12 HOURS SCHEDULED
Status: DISCONTINUED | OUTPATIENT
Start: 2018-08-17 | End: 2018-08-20 | Stop reason: HOSPADM

## 2018-08-17 RX ORDER — MIDAZOLAM HYDROCHLORIDE 1 MG/ML
INJECTION INTRAMUSCULAR; INTRAVENOUS PRN
Status: DISCONTINUED | OUTPATIENT
Start: 2018-08-17 | End: 2018-08-17 | Stop reason: SDUPTHER

## 2018-08-17 RX ORDER — MAGNESIUM HYDROXIDE 1200 MG/15ML
LIQUID ORAL CONTINUOUS PRN
Status: COMPLETED | OUTPATIENT
Start: 2018-08-17 | End: 2018-08-17

## 2018-08-17 RX ORDER — FENTANYL CITRATE 50 UG/ML
INJECTION, SOLUTION INTRAMUSCULAR; INTRAVENOUS PRN
Status: DISCONTINUED | OUTPATIENT
Start: 2018-08-17 | End: 2018-08-17 | Stop reason: SDUPTHER

## 2018-08-17 RX ADMIN — PIPERACILLIN SODIUM AND TAZOBACTAM SODIUM 3.38 G: 3; .375 INJECTION, POWDER, LYOPHILIZED, FOR SOLUTION INTRAVENOUS at 05:47

## 2018-08-17 RX ADMIN — Medication: at 10:34

## 2018-08-17 RX ADMIN — ONDANSETRON 4 MG: 2 INJECTION INTRAMUSCULAR; INTRAVENOUS at 15:46

## 2018-08-17 RX ADMIN — ATORVASTATIN CALCIUM 20 MG: 20 TABLET, FILM COATED ORAL at 10:34

## 2018-08-17 RX ADMIN — EPHEDRINE SULFATE 10 MG: 50 INJECTION INTRAVENOUS at 16:24

## 2018-08-17 RX ADMIN — INSULIN LISPRO 3 UNITS: 100 INJECTION, SOLUTION INTRAVENOUS; SUBCUTANEOUS at 10:40

## 2018-08-17 RX ADMIN — EPHEDRINE SULFATE 10 MG: 50 INJECTION INTRAVENOUS at 16:20

## 2018-08-17 RX ADMIN — LINEZOLID 600 MG: 600 INJECTION, SOLUTION INTRAVENOUS at 01:03

## 2018-08-17 RX ADMIN — BACITRACIN ZINC AND POLYMYXIN B SULFATE: 500; 10000 OINTMENT TOPICAL at 10:35

## 2018-08-17 RX ADMIN — PIPERACILLIN SODIUM AND TAZOBACTAM SODIUM 3.38 G: 3; .375 INJECTION, POWDER, LYOPHILIZED, FOR SOLUTION INTRAVENOUS at 22:10

## 2018-08-17 RX ADMIN — HYDROCODONE BITARTRATE AND ACETAMINOPHEN 1 TABLET: 5; 325 TABLET ORAL at 22:11

## 2018-08-17 RX ADMIN — PIPERACILLIN SODIUM AND TAZOBACTAM SODIUM 3.38 G: 3; .375 INJECTION, POWDER, LYOPHILIZED, FOR SOLUTION INTRAVENOUS at 13:07

## 2018-08-17 RX ADMIN — INSULIN LISPRO 2 UNITS: 100 INJECTION, SOLUTION INTRAVENOUS; SUBCUTANEOUS at 13:04

## 2018-08-17 RX ADMIN — SODIUM CHLORIDE, SODIUM LACTATE, POTASSIUM CHLORIDE, AND CALCIUM CHLORIDE: 600; 310; 30; 20 INJECTION, SOLUTION INTRAVENOUS at 15:06

## 2018-08-17 RX ADMIN — LINEZOLID 600 MG: 600 TABLET, FILM COATED ORAL at 22:10

## 2018-08-17 RX ADMIN — INSULIN LISPRO 1 UNITS: 100 INJECTION, SOLUTION INTRAVENOUS; SUBCUTANEOUS at 22:13

## 2018-08-17 RX ADMIN — PROPOFOL 100 MCG/KG/MIN: 10 INJECTION, EMULSION INTRAVENOUS at 15:46

## 2018-08-17 RX ADMIN — CARVEDILOL 6.25 MG: 6.25 TABLET, FILM COATED ORAL at 10:34

## 2018-08-17 RX ADMIN — LINEZOLID 600 MG: 600 TABLET, FILM COATED ORAL at 10:34

## 2018-08-17 RX ADMIN — FUROSEMIDE 40 MG: 40 TABLET ORAL at 10:33

## 2018-08-17 RX ADMIN — MIDAZOLAM HYDROCHLORIDE 2 MG: 1 INJECTION INTRAMUSCULAR; INTRAVENOUS at 15:46

## 2018-08-17 RX ADMIN — COLLAGENASE SANTYL: 250 OINTMENT TOPICAL at 10:35

## 2018-08-17 RX ADMIN — GABAPENTIN 600 MG: 600 TABLET, FILM COATED ORAL at 22:11

## 2018-08-17 RX ADMIN — FENTANYL CITRATE 100 MCG: 50 INJECTION INTRAMUSCULAR; INTRAVENOUS at 15:46

## 2018-08-17 RX ADMIN — AMITRIPTYLINE HYDROCHLORIDE 100 MG: 50 TABLET, FILM COATED ORAL at 22:10

## 2018-08-17 RX ADMIN — GABAPENTIN 600 MG: 600 TABLET, FILM COATED ORAL at 10:33

## 2018-08-17 RX ADMIN — METHADONE HYDROCHLORIDE 140 MG: 10 TABLET ORAL at 10:30

## 2018-08-17 RX ADMIN — GLYCOPYRROLATE 0.1 MG: 0.2 INJECTION INTRAMUSCULAR; INTRAVENOUS at 16:07

## 2018-08-17 ASSESSMENT — PAIN DESCRIPTION - ORIENTATION
ORIENTATION: RIGHT

## 2018-08-17 ASSESSMENT — PULMONARY FUNCTION TESTS
PIF_VALUE: 1
PIF_VALUE: 0
PIF_VALUE: 1
PIF_VALUE: 0
PIF_VALUE: 1
PIF_VALUE: 28
PIF_VALUE: 1
PIF_VALUE: 0
PIF_VALUE: 1
PIF_VALUE: 0
PIF_VALUE: 1
PIF_VALUE: 2
PIF_VALUE: 1
PIF_VALUE: 0
PIF_VALUE: 1
PIF_VALUE: 2
PIF_VALUE: 1
PIF_VALUE: 0
PIF_VALUE: 34
PIF_VALUE: 1
PIF_VALUE: 0
PIF_VALUE: 1
PIF_VALUE: 0
PIF_VALUE: 1

## 2018-08-17 ASSESSMENT — PAIN DESCRIPTION - PROGRESSION
CLINICAL_PROGRESSION: NOT CHANGED
CLINICAL_PROGRESSION: GRADUALLY IMPROVING

## 2018-08-17 ASSESSMENT — PAIN DESCRIPTION - FREQUENCY
FREQUENCY: CONTINUOUS

## 2018-08-17 ASSESSMENT — PAIN DESCRIPTION - PAIN TYPE
TYPE: ACUTE PAIN

## 2018-08-17 ASSESSMENT — PAIN SCALES - GENERAL
PAINLEVEL_OUTOF10: 0
PAINLEVEL_OUTOF10: 7
PAINLEVEL_OUTOF10: 7
PAINLEVEL_OUTOF10: 4
PAINLEVEL_OUTOF10: 4

## 2018-08-17 ASSESSMENT — PAIN DESCRIPTION - LOCATION
LOCATION: FOOT

## 2018-08-17 ASSESSMENT — PAIN DESCRIPTION - ONSET
ONSET: ON-GOING

## 2018-08-17 ASSESSMENT — ACTIVITIES OF DAILY LIVING (ADL)
EFFECT OF PAIN ON DAILY ACTIVITIES: DISCOMFORT
EFFECT OF PAIN ON DAILY ACTIVITIES: DISCOMFORT

## 2018-08-17 ASSESSMENT — PAIN DESCRIPTION - DESCRIPTORS
DESCRIPTORS: SHARP
DESCRIPTORS: SHARP;STABBING

## 2018-08-17 NOTE — OP NOTE
remaining 5th metatarsal had good bleeding bone at the medullary canal and a hard cortex of the 5th metatarsal.  Attention was then directed towards the remaining devitalized soft tissue. The remaining necrotic tissue (including redundant flexor and extensor tendon) was then removed using sharp dissection until good bleeding healthy tissue was noted. Attention was then directed towards irrigation. Using a pulse irrigation system, the surgical site was irrigated using approximately 3 L of normal saline. After copious irrigation the wound was re-inspected and noted to be healthy with good bleeding tissue noted. Because good, bleeding tissue was noted, the decision was made to proceed with closure. The surgical site was then closed using 3-0 nylon. At the conclusion of the procedure, the skin edges were well approximated and everted. No wound dehiscence was noted and the wound was noted to be completely closed. At this time, amnioflo and local anesthetic was injected about the incision sites, for the patient's postoperative comfort. A soft sterile dressing was applied. The patient tolerated the procedure and anesthesia well. The patient was transported from the OR to the PACU with vital signs stable and vascular status intact to all digits of the left foot. Following a short period of post-operative monitoring the patient is to be returned to his in hospital bed. Dictated on behalf of Dr. Celso Miranda.     Car Zarate DPM   PGY-2, Pager #: [phone]

## 2018-08-17 NOTE — PROGRESS NOTES
PACU Transfer Note    Vitals:    08/17/18 1830   BP: 103/66   Pulse: 72   Resp: 11   Temp: 96.9 °F (36.1 °C)   SpO2: 95%       In: 370 [P.O.:25; I.V.:345]  Out: -     Pain assessment:  none  Pain Level: 0    Report given to Receiving unit RN.    8/17/2018 6:38 PM

## 2018-08-17 NOTE — PROGRESS NOTES
Internal Medicine PGY-1 Resident Progress Note        PCP: Maria Alejandra Ng MD    Date of Admission: 8/14/2018    Chief Complaint: progressive rt foot pain    Hospital Course: 52 y.o. F w/ Hx T2DM (plus neuropathy), HTN, DVT and asthma; p/w complaint of 1 week increasing right foot pain. She was recommended at her podiatry appointment to get admitted for further evaluation. IM consulted by podiatry for management of her chronic medical condition. Patient reports pain upon moderate palpation of her right lower leg. She reports taking Metformin and Lantus for her diabetes but cannot recall her last morning blood glucose level. Patient reports subjective fever which started today, and draining from her right foot wound. She denies chills. Subjective:     No complaints fever, chills, night sweats, HA, lightheadedness, dizziness. No CP, chest tightness, no palpitations. No abdominal pain, n/v, diarrhea, no changes in BM or dysuria. Her chronic nonproductive cough due to smoking is unchanged. MRI (8/16/18): Osteomyelitis of the distal shaft and head of the fifth metatarsal in addition to the fifth proximal phalanx.   - podiatry planning on surgical intervention during this hospitalization  - abscess Cx: Enterobacter cloacae; sensitivites    Cr: 1.5 (down from 1.6 yesterday)  Glucose after changes in bolus insulin: 183, 197, 271  Stress test yesterday: no coronary ischemia    Per ID: cont zosyn + linezolid (wound cx pending)    Medications:  Reviewed    Infusion Medications    dextrose       Scheduled Medications    linezolid  600 mg Intravenous Q12H    insulin lispro  0-6 Units Subcutaneous TID     insulin lispro  0-3 Units Subcutaneous Nightly    insulin lispro  5 Units Subcutaneous TID     gabapentin  600 mg Oral BID    collagenase   Topical Daily    bacitracin-polymyxin b   Topical BID    methadone  140 mg Oral Daily    piperacillin-tazobactam  3.375 g Intravenous Q8H    amitriptyline  100 mg 08/15/18   0544  08/16/18   0653   INR  0.91  0.91     Recent Labs      08/15/18   1321  08/15/18   2040   TROPONINI  <0.01  <0.01       Urinalysis:      Lab Results   Component Value Date    NITRU Negative 03/26/2018    WBCUA 0-2 03/26/2018    BACTERIA Rare 03/26/2018    RBCUA 3-5 03/26/2018    BLOODU TRACE-LYSED 03/26/2018    SPECGRAV 1.015 03/26/2018    GLUCOSEU >=1000 03/26/2018       Radiology:  VL DUP LOWER EXTREMITY ARTERIES BILATERAL   Final Result      MRI FOOT RIGHT W WO CONTRAST   Final Result      Plantar soft tissue ulcer at the level of the fifth metatarsal head      Osteomyelitis of the distal shaft and head of the fifth metatarsal in addition to the fifth proximal phalanx. NM MYOCARDIAL SPECT REST EXERCISE OR RX   Final Result   IMPRESSION :      Normal myocardial perfusion scan      Ejection fraction 63 %             Summed stress score (SSS) : 0   Summed rest score (SRS) : 4   Summed difference score (SDS) : 0         XR FOOT RIGHT (MIN 3 VIEWS)   Final Result   1. Soft tissue swelling and ulceration along the distal lateral foot. No convincing evidence of osteomyelitis or acute osseous abnormality. XR FOOT LEFT (MIN 3 VIEWS)   Final Result   1. Previous first digit amputation. No acute osseous abnormality. Assessment/Plan:  52 y.o. F w/ Hx T2DM (plus neuropathy), HTN, DVT and asthma; complaining of 1 week increasing right foot pain. Admitted for right food cellulitis. IM following to manage chronic medical conditions. Cellulitis of Rt foot:  -Podiatry managing  - ID following: cont. Zosyn, change vanco to linezolid (due to inc risk FAHEEM)  - abscess Cx: Enterobacter cloacae; sensitivites  - MRI (8/16/18): Osteomyelitis of the distal shaft and head of the fifth metatarsal in addition to the fifth proximal phalanx.     T2DM w/ neropathy: (Glu 346 this am; 520 on admisison)  -acetaminophen 500 mg Q6H PRN  -gabapentin 600 mg BID  -held home metformin  -nutrition

## 2018-08-17 NOTE — ANESTHESIA PRE PROCEDURE
Department of Anesthesiology  Preprocedure Note       Name:  Rima Cornejo   Age:  52 y.o.  :  1965                                          MRN:  [id number]         Date:  2018      Surgeon: Rohan Thurman):  Miguelina Gamble DPM    Procedure: Procedure(s):  RIGHT FOOT DEBRIDEMENT INCISION AND DRAINAGE, PARTIAL 5TH RAY AMPUTATION    Medications prior to admission:   Prior to Admission medications    Medication Sig Start Date End Date Taking? Authorizing Provider   aspirin 325 MG tablet Take 325 mg by mouth daily   Yes Historical Provider, MD   amitriptyline (ELAVIL) 100 MG tablet Take 1 tablet by mouth nightly 18  Yes rAvind Uribe MD   gabapentin (NEURONTIN) 800 MG tablet Take 1 tablet by mouth 4 times daily for 90 days. . 8/1/18 10/30/18 Yes Dick Kaur MD   [facility]) 100 UNIT/ML injection pen Inject 50 Units into the skin nightly 18  Yes James Lamb MD   atorvastatin (LIPITOR) 20 MG tablet Take 1 tablet by mouth daily 18  Yes Bia Gonzales MD   carvedilol (COREG) 6.25 MG tablet Take 1 tablet by mouth 2 times daily 18  Yes Je Veliz MD   furosemide (LASIX) 40 MG tablet Take 1 tablet by mouth 2 times daily 18  Yes Je Veliz MD   metFORMIN (GLUCOPHAGE) 500 MG tablet Take 2 tablets by mouth 2 times daily (with meals) 18  Yes Jeremie Valdovinos MD   ammonium lactate (LAC-HYDRIN) 12 % lotion Apply topically daily. 18  Yes Kalli Vigil DPM   METHADONE HCL PO Take 146 mg by mouth daily   Yes Historical Provider, MD   blood glucose test strips (TRUE METRIX BLOOD GLUCOSE TEST) strip 1 each by In Vitro route 2 times daily As needed.  18   Bethany Avendaño MD   Lancets MISC 1 each by Does not apply route 2 times daily PHARMACY MAY SUBSTITUTE TO TRUE METRIX LANCETS 18   Bethany Avendaño MD   nicotine (NICODERM CQ) 14 MG/24HR Place 1 patch onto the skin every 24 hours 18   Miguelina Gamble DPM   omeprazole (PRILOSEC) 20 MG delayed release capsule Take 1 capsule by mouth Daily 5/21/18   Bacilio Louis MD   Gauze Pads & Dressings MISC Please dispense 4x8 guaze, kerlix, and ace 2/23/18   Fox Florez DPM   Accu-Chek Softclix Lancets MISC 1 strip by Does not apply route 2 times daily Check before breakfast and before going to bed 4/20/17   Ace Degroot MD   Insulin Pen Needle 31G X 5 MM MISC 1 each by Does not apply route daily 2/2/17   Ace Degroot MD       Current medications:    Current Facility-Administered Medications   Medication Dose Route Frequency Provider Last Rate Last Dose    linezolid (ZYVOX) tablet 600 mg  600 mg Oral 2 times per day Ai Lyons MD   600 mg at 08/17/18 1034    insulin glargine (LANTUS) injection pen 45 Units  45 Units Subcutaneous Nightly Delgado Curtis MD        insulin lispro (HUMALOG) injection pen 9 Units  9 Units Subcutaneous TID  Delgado Curtis MD        sodium chloride flush 0.9 % injection 10 mL  10 mL Intravenous PRN Michael Rodriguez MD   10 mL at 08/16/18 2049    insulin lispro (HUMALOG) injection pen 0-6 Units  0-6 Units Subcutaneous TID  Michael Rodriguez MD   2 Units at 08/17/18 1304    insulin lispro (HUMALOG) injection pen 0-3 Units  0-3 Units Subcutaneous Nightly Michael Rodriguez MD   1 Units at 08/16/18 2052    gabapentin (NEURONTIN) tablet 600 mg  600 mg Oral BID Delgado Curtis MD   600 mg at 08/17/18 1033    collagenase ointment   Topical Daily Xuan Lee DPM        ammonium lactate (LAC-HYDRIN) 12 % lotion   Topical PRN Melvena Gilford, DPM        bacitracin-polymyxin b (POLYSPORIN) ointment   Topical BID Melvena Gilford, DPM        methadone (DOLOPHINE) tablet 140 mg  140 mg Oral Daily Meera Hardy DPM   140 mg at 08/17/18 1030    piperacillin-tazobactam (ZOSYN) 3.375 g in dextrose 5 % 100 mL IVPB extended infusion (mini-bag)  3.375 g Intravenous Q8H Meera Hardy DPM 25 mL/hr at 08/17/18 1307 3.375 g at 08/17/18 1307    HYDROcodone-acetaminophen (NORCO) 5-325 MG per tablet 1 tablet  1 tablet Oral Q6H PRN Canelo Patricia, DPM   1 tablet at 08/16/18 2108    naloxone [facility]) injection 0.4 mg  0.4 mg Intravenous PRN [facility] Patricia, DPM        diphenhydrAMINE (BENADRYL) injection 25 mg  25 mg Intravenous Q6H PRN Canelo Patricia, DPM        acetaminophen (TYLENOL) tablet 500 mg  500 mg Oral Q6H PRN Canelo Patricia, DPM        docusate sodium (COLACE) capsule 100 mg  100 mg Oral BID PRN [facility] Patricia, DPM        ondansetron (ZOFRAN) injection 4 mg  4 mg Intravenous Q6H PRN Canelo Patricia, DPM        promethazine (PHENERGAN) tablet 12.5 mg  12.5 mg Oral Q6H PRN Canelo Patricia, DPM        glucose (GLUTOSE) 40 % oral gel 15 g  15 g Oral PRN [facility] Patricia, DPM        glucagon (rDNA) injection 1 mg  1 mg Intramuscular PRN Canelo Patricia, DPM        dextrose 5 % solution  100 mL/hr Intravenous PRN [facility] Patricia, DPM        amitriptyline (ELAVIL) tablet 100 mg  100 mg Oral Nightly Nancy Brooks MD   100 mg at 08/16/18 2050    atorvastatin (LIPITOR) tablet 20 mg  20 mg Oral Daily Nancy Brooks MD   20 mg at 08/17/18 1034    carvedilol (COREG) tablet 6.25 mg  6.25 mg Oral BID  Nancy Brooks MD   6.25 mg at 08/17/18 1034    furosemide (LASIX) tablet 40 mg  40 mg Oral BID Nancy Brooks MD   40 mg at 08/17/18 1033       Allergies:     Allergies   Allergen Reactions    Sulfa Antibiotics Itching       Problem List:    Patient Active Problem List   Diagnosis Code    Feet clawing Q66.89    Diabetic neuropathy (HCC) E11.40    Tinea pedis B35.3    Dyslipidemia E78.5    HTN (hypertension) I10    Amenorrhea N91.2    Dyspareunia [id number]    Neuropathy G62.9    Bacterial vaginosis N76.0, B96.89    Pain in back M54.9    Anomalies of nails Q84.6    Tobacco abuse Z72.0    Vaginal bleeding, abnormal N93.9    Carpal tunnel syndrome G56.00    Scalp lesion L98.9    ETOH abuse F10.10    Diabetes mellitus, type II (HCC) E11.9    DVT Surgical History:        Procedure Laterality Date     SECTION  unknown    OTHER SURGICAL HISTORY Left 2016    I & D left foot    OTHER SURGICAL HISTORY Right 10/20/2017    RIGHT GASTROC LENGTHENING ENDOSCOPIC, INJECTION OF AMNI GRAFT    OTHER SURGICAL HISTORY Right 2018    Diabetic foot ulcer I&D w/ integra graft application    PRE-MALIGNANT / BENIGN SKIN LESION EXCISION  7003    cryotherapy done on lesion    TOE AMPUTATION Left 2017    AMPUTATION LEFT GREAT TOE                    Social History:    Social History   Substance Use Topics    Smoking status: Former Smoker     Packs/day: 1.00     Years: 30.00     Types: Cigarettes     Quit date: 2018    Smokeless tobacco: Never Used    Alcohol use No      Comment: hx of etoh abuse, denies recent etoh use                                Counseling given: Not Answered      Vital Signs (Current):   Vitals:    18 0110 18 0547 18 0804 18 1205   BP: 131/74 (!) 148/74 119/68 (!) 147/86   Pulse: 78 81 74 81   Resp:    Temp: 97.5 °F (36.4 °C) 98.8 °F (37.1 °C) 97.7 °F (36.5 °C) 98.6 °F (37 °C)   TempSrc: Axillary Oral Oral Oral   SpO2: 96% 92% 92% 94%   Weight:       Height:                                                  BP Readings from Last 3 Encounters:   18 (!) 147/86   18 128/75   18 (!) 151/71       NPO Status:                                                                                 BMI:   Wt Readings from Last 3 Encounters:   18 183 lb 11.2 oz (83.3 kg)   18 186 lb (84.4 kg)   18 181 lb (82.1 kg)     Body mass index is 33.6 kg/m².     CBC:   Lab Results   Component Value Date    WBC 7.0 2018    RBC 4.08 2018    HGB 12.3 2018    HCT 36.4 2018    MCV 89.2 2018    RDW 15.1 2018     2018       CMP:   Lab Results   Component Value Date     2018    K 4.3 2018    K 6.2 2018    CL 98

## 2018-08-17 NOTE — PROGRESS NOTES
ID Follow-up NOTE  RESIDENT NOTE - reviewed / edited, attending note at bottom    CC:   Possible Osteomyelitis of Right Foot    Antibiotics: Zosyn and Linezolid     Admit Date: 2018  Hospital Day: 4    Subjective:     Patient was seen and examined this AM. Reports feeling well and is without complaint. Afebrile overnight. OR today per podiatry. Objective:     Patient Vitals for the past 8 hrs:   BP Temp Temp src Pulse Resp SpO2   18 0804 119/68 97.7 °F (36.5 °C) Oral 74 18 92 %   18 0547 (!) 148/74 98.8 °F (37.1 °C) Oral 81 16 92 %   18 0110 131/74 97.5 °F (36.4 °C) Axillary 78 16 96 %     I/O last 3 completed shifts: In: 550 [P.O.:240; I.V.:10; IV Piggyback:300]  Out: -   No intake/output data recorded. EXAM:  GENERAL: No apparent distress. HEENT: Membranes moist, no oral lesion  NECK:  Supple  LUNGS: Clear b/l, no rales, no dullness  CARDIAC: RRR, no murmur appreciated  ABD:  + BS, soft / NT  EXT:  Edema and erythema present on right foot; no drainage noted   NEURO: No focal neurologic findings  PSYCH: Orientation, sensorium, mood normal  LINES:  Peripheral iv       Data Review:  Lab Results   Component Value Date    WBC 7.0 2018    HGB 12.3 2018    HCT 36.4 2018    MCV 89.2 2018     2018     Lab Results   Component Value Date    CREATININE 1.5 (H) 2018    BUN 33 (H) 2018     2018    K 4.3 2018    CL 98 (L) 2018    CO2 28 2018       Hepatic Function Panel: Lab Results   Component Value Date    ALKPHOS 218 2018    ALT 17 2018    AST 14 2018    PROT 7.0 2018    PROT 6.6 2011    BILITOT <0.2 2018    BILIDIR <0.2 2018    IBILI see below 2018    LABALBU 3.5 2018       MICRO:  8/15 - Wound culture growing Enterobacter cloacae     IMAGIN/16 - MRI of right foot showing plantar soft tissue ulcer at level of the fifth metatarsal head.  Osteomyelitis of exam, LE venous stasis, edema, L foot redness, L hallux stump with dehiscence, warmth  MRI with 'subtle . .. signal alteration', Culture - G     Admit 8/14 from podiatry clinic with R foot pain.  ESR 80, CRP 21.4.    Wound cult sent - GS 2+GNR, 1+GPC.     R foot x-ray - no OM.    Started on zosyn + vancomycin    Culture - light mixed skin sukhjinder, heavy E cloacae, 2nd GNR  ENTEROBACTER CLOACAE   Antibiotic Interpretation ISAAC Unit   amoxicillin-clavulanate Resistant >16/8 mcg/mL   ampicillin Resistant >16 mcg/mL   ceFAZolin Resistant >16 mcg/mL   cefOXitin Resistant >16 mcg/mL   cefTRIAXone Resistant 8 mcg/mL   cefepime Sensitive <=2 mcg/mL   cefotaxime Sensitive 8 mcg/mL   cefuroxime Resistant >16 mcg/mL   ciprofloxacin Sensitive <=1 mcg/mL   gentamicin Sensitive <=4 mcg/mL   meropenem Sensitive <=1 mcg/mL   piperacillin-tazobactam Sensitive <=16 mcg/mL   trimethoprim-sulfamethoxazole Sensitive <=2/38 mcg/mL     MRI  - Plantar soft tissue ulcer at the level of the fifth metatarsal head  - Osteomyelitis of the distal shaft and head of the fifth metatarsal in addition to the fifth proximal phalanx.     IMP/  DM, neuropathy, CKD  Venous stasis with LE edema, less swelling  Hx DFI with R hallux / 2nd toe OM (see x-ray / MRI results, )     L foot ulcer with poss extension OM (reviewed image in podiatry H&P     REC/  Cont zosyn + linezolid  Will f/u on wound cult results - await ID / sens for 2nd GNR  OR today per Podiatry - await findings, culture, clearance fragment     Discussed with pt, poss need for home iv / PICC     Discussed with pt  Boubacar Escobedo MD

## 2018-08-17 NOTE — PROGRESS NOTES
glucose test strips (TRUE METRIX BLOOD GLUCOSE TEST) strip 1 each by In Vitro route 2 times daily As needed.  7/23/18   Ksenia Courtney MD   Lancets MISC 1 each by Does not apply route 2 times daily PHARMACY MAY SUBSTITUTE TO TRUE METRIX LANCETS 7/23/18   Ksenia Courtney MD   nicotine (NICODERM CQ) 14 MG/24HR Place 1 patch onto the skin every 24 hours 6/5/18   Lyndsey Cain DPM   omeprazole (PRILOSEC) 20 MG delayed release capsule Take 1 capsule by mouth Daily 5/21/18   James Dixon MD   Gauze Pads & Dressings MISC Please dispense 4x8 guaze, kerlix, and ace 2/23/18   Twyla Bowen DPM   Accu-Chek Softclix Lancets MISC 1 strip by Does not apply route 2 times daily Check before breakfast and before going to bed 4/20/17   Ksenia Courtney MD   Insulin Pen Needle 31G X 5 MM MISC 1 each by Does not apply route daily 2/2/17   Ksenia Courtney MD        Current Medications:  Current Facility-Administered Medications   Medication Dose Route Frequency Provider Last Rate Last Dose    linezolid (ZYVOX) tablet 600 mg  600 mg Oral 2 times per day Angeles Paredes MD   600 mg at 08/17/18 1034    insulin glargine (LANTUS) injection pen 45 Units  45 Units Subcutaneous Nightly Joao Smith MD        insulin lispro (HUMALOG) injection pen 9 Units  9 Units Subcutaneous TID  Joao Smith MD        sodium chloride flush 0.9 % injection 10 mL  10 mL Intravenous PRN Tadeo Johnson MD   10 mL at 08/16/18 2049    insulin lispro (HUMALOG) injection pen 0-6 Units  0-6 Units Subcutaneous TID  Tadeo Johnson MD   2 Units at 08/17/18 1304    insulin lispro (HUMALOG) injection pen 0-3 Units  0-3 Units Subcutaneous Nightly Tadeo Johnson MD   1 Units at 08/16/18 2052    gabapentin (NEURONTIN) tablet 600 mg  600 mg Oral BID Joao Smith MD   600 mg at 08/17/18 1033    collagenase ointment   Topical Daily Xuan Lee DPM        ammonium lactate (LAC-HYDRIN) 12 % lotion   Topical PRN Michael Lee, DPM        bacitracin-polymyxin b (POLYSPORIN) ointment   Topical BID Eryn Lites, DPM        methadone (DOLOPHINE) tablet 140 mg  140 mg Oral Daily Arlis [facility], DPM   140 mg at 08/17/18 1030    piperacillin-tazobactam (ZOSYN) 3.375 g in dextrose 5 % 100 mL IVPB extended infusion (mini-bag)  3.375 g Intravenous Q8H Arlis Leatha, DPM 25 mL/hr at 08/17/18 1307 3.375 g at 08/17/18 1307    HYDROcodone-acetaminophen (NORCO) 5-325 MG per tablet 1 tablet  1 tablet Oral Q6H PRN Arlis [facility], DPM   1 tablet at 08/16/18 2108    naloxone (NARCAN) injection 0.4 mg  0.4 mg Intravenous PRN Arlis [facility], DPM        diphenhydrAMINE (BENADRYL) injection 25 mg  25 mg Intravenous Q6H PRN Arlis [facility], DPM        acetaminophen (TYLENOL) tablet 500 mg  500 mg Oral Q6H PRN Arlis [facility], DPM        docusate sodium (COLACE) capsule 100 mg  100 mg Oral BID PRN Arlis [facility], DPM        ondansetron [facility]) injection 4 mg  4 mg Intravenous Q6H PRN Arlis [facility], DPM        promethazine (PHENERGAN) tablet 12.5 mg  12.5 mg Oral Q6H PRN Arlis Leatha, DPM        glucose (GLUTOSE) 40 % oral gel 15 g  15 g Oral PRN Arlis Leatha, DPM        glucagon (rDNA) injection 1 mg  1 mg Intramuscular PRN Arlis Leatha, DPM        dextrose 5 % solution  100 mL/hr Intravenous PRN Arlis [facility], DPM        amitriptyline (ELAVIL) tablet 100 mg  100 mg Oral Nightly Manuel Fenton MD   100 mg at 08/16/18 2050    atorvastatin (LIPITOR) tablet 20 mg  20 mg Oral Daily Manuel Fenton MD   20 mg at 08/17/18 1034    carvedilol (COREG) tablet 6.25 mg  6.25 mg Oral BID  Manuel Fenton MD   6.25 mg at 08/17/18 1034    furosemide (LASIX) tablet 40 mg  40 mg Oral BID Manuel Fenton MD   40 mg at 08/17/18 1033       Allergies:  Sulfa antibiotics     Social hx     Objective:   BP (!) 147/86   Pulse 81   Temp 98.6 °F (37 °C) (Oral)   Resp 16   Ht 5' 2\" (1.575 m)   Wt 183 lb 11.2 oz (83.3 kg)   SpO2 94%   BMI 33.60 kg/m² Intake/Output Summary (Last 24 hours) at 08/17/18 1503  Last data filed at 08/17/18 0559   Gross per 24 hour   Intake              550 ml   Output                0 ml   Net              550 ml       · ROS:   · Cardiovascular: Reviewed in HPI  · Allergic/Immunologic: No nasal congestion or hives. · All other ROS are reviewed and are unremarkable.     Physical Exam:  General:  Awake, alert, NAD  Skin:  Warm and dry  Chest:  Clear to auscultation, respiration easy  Cardiovascular:  RRR S1S2  Abdomen:  Soft     Reviewed all lab and diagnostic testing      Patient Active Problem List    Diagnosis Date Noted    Open wound of left foot 05/22/2014     Priority: High     Class: Chronic    Acute systolic congestive heart failure (HCC)     Cellulitis of right foot 08/14/2018    Diabetic ulcer of right foot associated with type 2 diabetes mellitus, with fat layer exposed ([facility] 07/17/2018    Open wound of right foot 07/03/2018    Cellulitis 03/27/2018    Diabetic foot infection ([address] 03/27/2018    Bilateral lower extremity edema 03/27/2018    Chronic multifocal osteomyelitis of left foot ([facility] 03/27/2018    Open wound of left foot with complication 68/20/9394    Post-op pain 10/30/2017    Pain medication agreement signed 06/22/2017    Diabetic ulcer of left foot associated with type 2 diabetes mellitus ([facility] 02/22/2016    Obesity (BMI 30-39.9) 09/23/2015    Burn 01/05/2015    Tinea pedis 08/22/2013    Depressive disorder, not elsewhere classified 05/17/2013    Anxiety state 05/17/2013    Onychomycosis 11/15/2012    Hypoxia 07/21/2011    Acute pancreatitis 07/16/2011    Pneumonia 07/15/2011    Nicotine addiction 07/15/2011    Feet clawing     Diabetic neuropathy (HCC)     Tinea pedis     HTN (hypertension)     Amenorrhea     Anomalies of nails     Dyslipidemia 05/01/2009    Dyspareunia 05/01/2009    Neuropathy 05/01/2009    Bacterial vaginosis 04/01/2008    Pain in back 04/01/2008    Tobacco abuse 03/01/2008    Scalp lesion 08/01/2007    Diabetes mellitus, type II ([facility] 08/01/2007    Vaginal bleeding, abnormal 06/01/2007    Carpal tunnel syndrome 05/01/2007    ETOH abuse 03/04/2007    Pseudocyst, pancreas 05/14/2004    Pancreatitis 05/14/2004    Asthma 05/14/2004    Pain, eye, right 05/14/2004    DVT (deep venous thrombosis) ([facility] [facility] 03/01/2004      Assessment     admit with cellulitis of R foot and bilateral foot ulcers. cardiology consult to manage cardiac care  / no hx CAD   Trop neg x one      Echo 3/2018 EF WNL  Septal wall appears hypokinetic. Normal diastolic  function. Moderate tricuspid regurgitation.  The right atrium is dilated.     Hx: DMT2  Per IM     tobacco use   Cessation discussed       diabetic neuropathy  Per IM      HTN  Optimal     Hx DVT     asthma / tobacco use   Cessation       Plan:   ID and podiatry following   MRI findings and level involvement of the bone to the 5th metatarsal shaft and head, as well as, the proximal phalanx of the 5th digit  Per cardiology she is low cardiac risk during perioperative period   Sign off cardiology     stress test  8/16/18  Neg    Stress nuclear on 8/16/18    Normal myocardial perfusion scan   Ejection fraction [address], APRN FNP CVNP  8/17/2018

## 2018-08-17 NOTE — BRIEF OP NOTE
Brief Postoperative Note  ______________________________________________________________    Patient: Marie Estrada  YOB: 1965  MRN: [id number]  Date of Procedure: 8/17/2018    Pre-Op Diagnosis: OSTEOMYLYTIS 5TH METATARSAL RIGHT FOOT    Post-Op Diagnosis: Same       Procedure(s):  RIGHT FOOT DEBRIDEMENT INCISION AND DRAINAGE, PARTIAL 5TH RAY AMPUTATION    Anesthesia: General, Monitor Anesthesia Care    Surgeon(s):  Jacklyn Johnson DPM    Staff:  First Assistant: Car Zarate DPM  Scrub Person First: Chelle Barragan     Injectables: 10 cc of 1% lidocaine plain, pre op   10 cc of 0.5% marcaine plain, pre op   Amnioflo    Hemostasis: anatomic dissection    Materials:     4-0 nylon    Estimated Blood Loss: less than 50     Complications: None          Specimens:   ID Type Source Tests Collected by Time Destination   1 : RIGHT 5TH TOE Specimen Foot SURGICAL CULTURE Jacklyn Johnson DPM 8/17/2018 1600    2 : RIGHT 5TH METATARSAL Specimen Foot SURGICAL CULTURE Jacklyn Johnson DPM 8/17/2018 1608    A : RIGHT 5TH METATARSAL Specimen Foot SURGICAL PATHOLOGY Jacklyn Johnson DPM 8/17/2018 1610      Findings: Soft bone on 5th digit with yellow discoloration on cartilage of 5th metatarsal head. Post partial 5th ray, the bone was hard with bleeding medullary canal.  Tissue was granular and bleeding. No signs of further infection. DISPO: s/p Partial 5th ray amputation with closure. Tissue was granular and bleeding with bleeding medullary canal and hard, healthy cortical bone of 5th met. Determined no further resection necessary. Patient is recommended to go to SNF. Will f/u with Social work. Will f/u with ID recs for discharge.     Car Zarate DPM  Date: 8/17/2018  Time: 5:28 PM

## 2018-08-18 LAB
ANION GAP SERPL CALCULATED.3IONS-SCNC: 10 MMOL/L (ref 3–16)
BASOPHILS ABSOLUTE: 0 K/UL (ref 0–0.2)
BASOPHILS RELATIVE PERCENT: 0.3 %
BUN BLDV-MCNC: 33 MG/DL (ref 7–20)
C-REACTIVE PROTEIN: 30.1 MG/L (ref 0–5.1)
CALCIUM SERPL-MCNC: 9.3 MG/DL (ref 8.3–10.6)
CHLORIDE BLD-SCNC: 97 MMOL/L (ref 99–110)
CO2: 27 MMOL/L (ref 21–32)
CREAT SERPL-MCNC: 1.5 MG/DL (ref 0.6–1.1)
EOSINOPHILS ABSOLUTE: 0.2 K/UL (ref 0–0.6)
EOSINOPHILS RELATIVE PERCENT: 2.1 %
GFR AFRICAN AMERICAN: 44
GFR NON-AFRICAN AMERICAN: 36
GLUCOSE BLD-MCNC: 131 MG/DL (ref 70–99)
GLUCOSE BLD-MCNC: 266 MG/DL (ref 70–99)
GLUCOSE BLD-MCNC: 60 MG/DL (ref 70–99)
GLUCOSE BLD-MCNC: 93 MG/DL (ref 70–99)
GLUCOSE BLD-MCNC: 95 MG/DL (ref 70–99)
GLUCOSE BLD-MCNC: 99 MG/DL (ref 70–99)
GRAM STAIN RESULT: ABNORMAL
HCT VFR BLD CALC: 34.8 % (ref 36–48)
HEMOGLOBIN: 11.5 G/DL (ref 12–16)
INR BLD: 0.97 (ref 0.86–1.14)
LYMPHOCYTES ABSOLUTE: 2.4 K/UL (ref 1–5.1)
LYMPHOCYTES RELATIVE PERCENT: 22.9 %
MCH RBC QN AUTO: 29.9 PG (ref 26–34)
MCHC RBC AUTO-ENTMCNC: 33 G/DL (ref 31–36)
MCV RBC AUTO: 90.7 FL (ref 80–100)
MONOCYTES ABSOLUTE: 0.7 K/UL (ref 0–1.3)
MONOCYTES RELATIVE PERCENT: 6.3 %
NEUTROPHILS ABSOLUTE: 7.2 K/UL (ref 1.7–7.7)
NEUTROPHILS RELATIVE PERCENT: 68.4 %
ORGANISM: ABNORMAL
ORGANISM: ABNORMAL
PDW BLD-RTO: 15.2 % (ref 12.4–15.4)
PERFORMED ON: ABNORMAL
PERFORMED ON: NORMAL
PERFORMED ON: NORMAL
PLATELET # BLD: 308 K/UL (ref 135–450)
PMV BLD AUTO: 7.9 FL (ref 5–10.5)
POTASSIUM SERPL-SCNC: 5 MMOL/L (ref 3.5–5.1)
PROTHROMBIN TIME: 11.1 SEC (ref 9.8–13)
RBC # BLD: 3.84 M/UL (ref 4–5.2)
REASON FOR REJECTION: NORMAL
REJECTED TEST: NORMAL
SEDIMENTATION RATE, ERYTHROCYTE: 92 MM/HR (ref 0–30)
SODIUM BLD-SCNC: 134 MMOL/L (ref 136–145)
WBC # BLD: 10.5 K/UL (ref 4–11)
WOUND/ABSCESS: ABNORMAL

## 2018-08-18 PROCEDURE — 6370000000 HC RX 637 (ALT 250 FOR IP): Performed by: STUDENT IN AN ORGANIZED HEALTH CARE EDUCATION/TRAINING PROGRAM

## 2018-08-18 PROCEDURE — 1200000000 HC SEMI PRIVATE

## 2018-08-18 PROCEDURE — 85025 COMPLETE CBC W/AUTO DIFF WBC: CPT

## 2018-08-18 PROCEDURE — 6370000000 HC RX 637 (ALT 250 FOR IP): Performed by: INTERNAL MEDICINE

## 2018-08-18 PROCEDURE — 85610 PROTHROMBIN TIME: CPT

## 2018-08-18 PROCEDURE — 94664 DEMO&/EVAL PT USE INHALER: CPT

## 2018-08-18 PROCEDURE — 2580000003 HC RX 258: Performed by: INTERNAL MEDICINE

## 2018-08-18 PROCEDURE — 6360000002 HC RX W HCPCS: Performed by: STUDENT IN AN ORGANIZED HEALTH CARE EDUCATION/TRAINING PROGRAM

## 2018-08-18 PROCEDURE — 80048 BASIC METABOLIC PNL TOTAL CA: CPT

## 2018-08-18 PROCEDURE — 36415 COLL VENOUS BLD VENIPUNCTURE: CPT

## 2018-08-18 PROCEDURE — 94761 N-INVAS EAR/PLS OXIMETRY MLT: CPT

## 2018-08-18 PROCEDURE — 94150 VITAL CAPACITY TEST: CPT

## 2018-08-18 PROCEDURE — 85652 RBC SED RATE AUTOMATED: CPT

## 2018-08-18 PROCEDURE — 2580000003 HC RX 258: Performed by: STUDENT IN AN ORGANIZED HEALTH CARE EDUCATION/TRAINING PROGRAM

## 2018-08-18 PROCEDURE — 2700000000 HC OXYGEN THERAPY PER DAY

## 2018-08-18 RX ADMIN — FUROSEMIDE 40 MG: 40 TABLET ORAL at 08:51

## 2018-08-18 RX ADMIN — ATORVASTATIN CALCIUM 20 MG: 20 TABLET, FILM COATED ORAL at 08:50

## 2018-08-18 RX ADMIN — HYDROCODONE BITARTRATE AND ACETAMINOPHEN 1 TABLET: 5; 325 TABLET ORAL at 20:12

## 2018-08-18 RX ADMIN — GABAPENTIN 600 MG: 600 TABLET, FILM COATED ORAL at 20:12

## 2018-08-18 RX ADMIN — GABAPENTIN 600 MG: 600 TABLET, FILM COATED ORAL at 08:50

## 2018-08-18 RX ADMIN — INSULIN LISPRO 3 UNITS: 100 INJECTION, SOLUTION INTRAVENOUS; SUBCUTANEOUS at 12:56

## 2018-08-18 RX ADMIN — METHADONE HYDROCHLORIDE 140 MG: 10 TABLET ORAL at 08:49

## 2018-08-18 RX ADMIN — LINEZOLID 600 MG: 600 TABLET, FILM COATED ORAL at 20:12

## 2018-08-18 RX ADMIN — Medication 10 ML: at 08:52

## 2018-08-18 RX ADMIN — ACETAMINOPHEN 500 MG: 500 TABLET ORAL at 00:45

## 2018-08-18 RX ADMIN — CARVEDILOL 6.25 MG: 6.25 TABLET, FILM COATED ORAL at 08:51

## 2018-08-18 RX ADMIN — FUROSEMIDE 40 MG: 40 TABLET ORAL at 17:31

## 2018-08-18 RX ADMIN — AMITRIPTYLINE HYDROCHLORIDE 100 MG: 50 TABLET, FILM COATED ORAL at 20:12

## 2018-08-18 RX ADMIN — ACETAMINOPHEN 500 MG: 500 TABLET ORAL at 15:49

## 2018-08-18 RX ADMIN — PIPERACILLIN SODIUM AND TAZOBACTAM SODIUM 3.38 G: 3; .375 INJECTION, POWDER, LYOPHILIZED, FOR SOLUTION INTRAVENOUS at 06:10

## 2018-08-18 RX ADMIN — LINEZOLID 600 MG: 600 TABLET, FILM COATED ORAL at 08:50

## 2018-08-18 RX ADMIN — CARVEDILOL 6.25 MG: 6.25 TABLET, FILM COATED ORAL at 17:31

## 2018-08-18 RX ADMIN — PIPERACILLIN SODIUM AND TAZOBACTAM SODIUM 3.38 G: 3; .375 INJECTION, POWDER, LYOPHILIZED, FOR SOLUTION INTRAVENOUS at 14:16

## 2018-08-18 RX ADMIN — ENOXAPARIN SODIUM 40 MG: 40 INJECTION SUBCUTANEOUS at 08:51

## 2018-08-18 RX ADMIN — HYDROCODONE BITARTRATE AND ACETAMINOPHEN 1 TABLET: 5; 325 TABLET ORAL at 14:17

## 2018-08-18 RX ADMIN — PIPERACILLIN SODIUM AND TAZOBACTAM SODIUM 3.38 G: 3; .375 INJECTION, POWDER, LYOPHILIZED, FOR SOLUTION INTRAVENOUS at 20:13

## 2018-08-18 ASSESSMENT — PAIN SCALES - GENERAL
PAINLEVEL_OUTOF10: 3
PAINLEVEL_OUTOF10: 3
PAINLEVEL_OUTOF10: 6
PAINLEVEL_OUTOF10: 10
PAINLEVEL_OUTOF10: 6
PAINLEVEL_OUTOF10: 6
PAINLEVEL_OUTOF10: 8

## 2018-08-18 ASSESSMENT — PAIN DESCRIPTION - PROGRESSION
CLINICAL_PROGRESSION: GRADUALLY WORSENING
CLINICAL_PROGRESSION: NOT CHANGED
CLINICAL_PROGRESSION: GRADUALLY WORSENING

## 2018-08-18 ASSESSMENT — PAIN DESCRIPTION - LOCATION
LOCATION: BACK;FOOT
LOCATION: GENERALIZED
LOCATION: GENERALIZED
LOCATION: FOOT

## 2018-08-18 ASSESSMENT — PAIN DESCRIPTION - DESCRIPTORS
DESCRIPTORS: SHARP
DESCRIPTORS: ACHING;SHARP
DESCRIPTORS: ACHING
DESCRIPTORS: ACHING

## 2018-08-18 ASSESSMENT — PAIN DESCRIPTION - ORIENTATION
ORIENTATION: RIGHT

## 2018-08-18 ASSESSMENT — PAIN DESCRIPTION - PAIN TYPE
TYPE: ACUTE PAIN
TYPE: CHRONIC PAIN
TYPE: ACUTE PAIN
TYPE: ACUTE PAIN

## 2018-08-18 ASSESSMENT — PAIN DESCRIPTION - ONSET
ONSET: ON-GOING
ONSET: GRADUAL
ONSET: GRADUAL
ONSET: ON-GOING

## 2018-08-18 ASSESSMENT — PAIN DESCRIPTION - FREQUENCY
FREQUENCY: INTERMITTENT

## 2018-08-18 NOTE — PROGRESS NOTES
Podiatric Surgery Daily Progress Note  Shelia Madeleine  Subjective :   Patient seen and examined this am at the bedside. Patient denies any acute overnight events. Patient denies N/V/F/C/SOB. Patient denies calf pain, thigh pain, chest pain. Objective     /80   Pulse 78   Temp 99.1 °F (37.3 °C) (Axillary)   Resp 16   Ht 5' 2\" (1.575 m)   Wt 183 lb 11.2 oz (83.3 kg)   SpO2 99%   BMI 33.60 kg/m²      I/O:  Intake/Output Summary (Last 24 hours) at 08/18/18 0802  Last data filed at 08/18/18 0645   Gross per 24 hour   Intake              710 ml   Output                0 ml   Net              710 ml              Wt Readings from Last 3 Encounters:   08/16/18 183 lb 11.2 oz (83.3 kg)   04/30/18 186 lb (84.4 kg)   04/26/18 181 lb (82.1 kg)       LABS:    Recent Labs      08/17/18   0701  08/18/18   0452   WBC  7.0  10.5   HGB  12.3  11.5*   HCT  36.4  34.8*   PLT  321  308      Recent Labs      08/18/18   0452   NA  134*   K  5.0   CL  97*   CO2  27   BUN  33*   CREATININE  1.5*        Recent Labs      08/17/18   0700  08/18/18   0453   INR  0.90  0.97           LOWER EXTREMITY EXAMINATION    Dressing to RLE intact. No strikethrough noted to the external dressing. IMAGING:      RIGHT FOOT MRI       Plantar soft tissue ulcer at the level of the fifth metatarsal head       Osteomyelitis of the distal shaft and head of the fifth metatarsal in addition to the fifth proximal phalanx. ARTERIAL DUPLEX:   Right Impression   Technically difficult study due to edema. The right ankle/brachial index is 1.06 (the DP could not be assessed due to   the bandage and the PT is 170 mmHg). The common femoral artery demonstrates multiphasic flow indicating no   significant inflow disease. There is normal multiphasic flow throughout the femoral-popliteal segment with   no significant plaque or focal lesions. Monophasic, hyperemic waveforms noted in the tibial arteries.    Left Impression   The left

## 2018-08-18 NOTE — PLAN OF CARE
Problem: Falls - Risk of:  Goal: Will remain free from falls  Will remain free from falls   Outcome: Ongoing  Resident continues to score high on high falls risk scale but refuses bed alarm. She also refuses BSC and bed pan for toileting while NWB on RLE. Educated on fall risk and risk of injury.

## 2018-08-19 PROBLEM — M86.171 ACUTE OSTEOMYELITIS OF METATARSAL BONE OF RIGHT FOOT (HCC): Status: ACTIVE | Noted: 2018-08-19

## 2018-08-19 LAB
ANION GAP SERPL CALCULATED.3IONS-SCNC: 11 MMOL/L (ref 3–16)
BASOPHILS ABSOLUTE: 0 K/UL (ref 0–0.2)
BASOPHILS RELATIVE PERCENT: 0.4 %
BUN BLDV-MCNC: 37 MG/DL (ref 7–20)
C-REACTIVE PROTEIN: 53.7 MG/L (ref 0–5.1)
CALCIUM SERPL-MCNC: 9.9 MG/DL (ref 8.3–10.6)
CHLORIDE BLD-SCNC: 100 MMOL/L (ref 99–110)
CO2: 30 MMOL/L (ref 21–32)
CREAT SERPL-MCNC: 1.7 MG/DL (ref 0.6–1.1)
EOSINOPHILS ABSOLUTE: 0.3 K/UL (ref 0–0.6)
EOSINOPHILS RELATIVE PERCENT: 3.3 %
GFR AFRICAN AMERICAN: 38
GFR NON-AFRICAN AMERICAN: 31
GLUCOSE BLD-MCNC: 115 MG/DL (ref 70–99)
GLUCOSE BLD-MCNC: 121 MG/DL (ref 70–99)
GLUCOSE BLD-MCNC: 167 MG/DL (ref 70–99)
GLUCOSE BLD-MCNC: 180 MG/DL (ref 70–99)
GLUCOSE BLD-MCNC: 291 MG/DL (ref 70–99)
HCT VFR BLD CALC: 34.9 % (ref 36–48)
HEMOGLOBIN: 11.5 G/DL (ref 12–16)
INR BLD: 0.94 (ref 0.86–1.14)
LYMPHOCYTES ABSOLUTE: 2 K/UL (ref 1–5.1)
LYMPHOCYTES RELATIVE PERCENT: 24.2 %
MCH RBC QN AUTO: 29.9 PG (ref 26–34)
MCHC RBC AUTO-ENTMCNC: 32.9 G/DL (ref 31–36)
MCV RBC AUTO: 90.8 FL (ref 80–100)
MONOCYTES ABSOLUTE: 0.6 K/UL (ref 0–1.3)
MONOCYTES RELATIVE PERCENT: 7.2 %
NEUTROPHILS ABSOLUTE: 5.3 K/UL (ref 1.7–7.7)
NEUTROPHILS RELATIVE PERCENT: 64.9 %
PDW BLD-RTO: 14.8 % (ref 12.4–15.4)
PERFORMED ON: ABNORMAL
PLATELET # BLD: 314 K/UL (ref 135–450)
PMV BLD AUTO: 8.4 FL (ref 5–10.5)
POTASSIUM SERPL-SCNC: 4.6 MMOL/L (ref 3.5–5.1)
PROTHROMBIN TIME: 10.7 SEC (ref 9.8–13)
RBC # BLD: 3.85 M/UL (ref 4–5.2)
SEDIMENTATION RATE, ERYTHROCYTE: 108 MM/HR (ref 0–30)
SODIUM BLD-SCNC: 141 MMOL/L (ref 136–145)
WBC # BLD: 8.2 K/UL (ref 4–11)

## 2018-08-19 PROCEDURE — 85652 RBC SED RATE AUTOMATED: CPT

## 2018-08-19 PROCEDURE — 6370000000 HC RX 637 (ALT 250 FOR IP): Performed by: INTERNAL MEDICINE

## 2018-08-19 PROCEDURE — 80048 BASIC METABOLIC PNL TOTAL CA: CPT

## 2018-08-19 PROCEDURE — 6370000000 HC RX 637 (ALT 250 FOR IP): Performed by: STUDENT IN AN ORGANIZED HEALTH CARE EDUCATION/TRAINING PROGRAM

## 2018-08-19 PROCEDURE — 85025 COMPLETE CBC W/AUTO DIFF WBC: CPT

## 2018-08-19 PROCEDURE — 86140 C-REACTIVE PROTEIN: CPT

## 2018-08-19 PROCEDURE — 85610 PROTHROMBIN TIME: CPT

## 2018-08-19 PROCEDURE — 36415 COLL VENOUS BLD VENIPUNCTURE: CPT

## 2018-08-19 PROCEDURE — 6360000002 HC RX W HCPCS: Performed by: STUDENT IN AN ORGANIZED HEALTH CARE EDUCATION/TRAINING PROGRAM

## 2018-08-19 PROCEDURE — 1200000000 HC SEMI PRIVATE

## 2018-08-19 PROCEDURE — 2580000003 HC RX 258: Performed by: STUDENT IN AN ORGANIZED HEALTH CARE EDUCATION/TRAINING PROGRAM

## 2018-08-19 RX ADMIN — ATORVASTATIN CALCIUM 20 MG: 20 TABLET, FILM COATED ORAL at 08:44

## 2018-08-19 RX ADMIN — INSULIN LISPRO 1 UNITS: 100 INJECTION, SOLUTION INTRAVENOUS; SUBCUTANEOUS at 16:56

## 2018-08-19 RX ADMIN — CARVEDILOL 6.25 MG: 6.25 TABLET, FILM COATED ORAL at 16:59

## 2018-08-19 RX ADMIN — ENOXAPARIN SODIUM 40 MG: 40 INJECTION SUBCUTANEOUS at 08:44

## 2018-08-19 RX ADMIN — FUROSEMIDE 40 MG: 40 TABLET ORAL at 17:00

## 2018-08-19 RX ADMIN — INSULIN LISPRO 2 UNITS: 100 INJECTION, SOLUTION INTRAVENOUS; SUBCUTANEOUS at 20:06

## 2018-08-19 RX ADMIN — METHADONE HYDROCHLORIDE 140 MG: 10 TABLET ORAL at 08:43

## 2018-08-19 RX ADMIN — LINEZOLID 600 MG: 600 TABLET, FILM COATED ORAL at 20:14

## 2018-08-19 RX ADMIN — LINEZOLID 600 MG: 600 TABLET, FILM COATED ORAL at 08:44

## 2018-08-19 RX ADMIN — FUROSEMIDE 40 MG: 40 TABLET ORAL at 08:44

## 2018-08-19 RX ADMIN — PIPERACILLIN SODIUM AND TAZOBACTAM SODIUM 3.38 G: 3; .375 INJECTION, POWDER, LYOPHILIZED, FOR SOLUTION INTRAVENOUS at 22:26

## 2018-08-19 RX ADMIN — BACITRACIN ZINC AND POLYMYXIN B SULFATE: 500; 10000 OINTMENT TOPICAL at 08:45

## 2018-08-19 RX ADMIN — HYDROCODONE BITARTRATE AND ACETAMINOPHEN 1 TABLET: 5; 325 TABLET ORAL at 16:57

## 2018-08-19 RX ADMIN — INSULIN LISPRO 1 UNITS: 100 INJECTION, SOLUTION INTRAVENOUS; SUBCUTANEOUS at 12:40

## 2018-08-19 RX ADMIN — PIPERACILLIN SODIUM AND TAZOBACTAM SODIUM 3.38 G: 3; .375 INJECTION, POWDER, LYOPHILIZED, FOR SOLUTION INTRAVENOUS at 06:04

## 2018-08-19 RX ADMIN — GABAPENTIN 600 MG: 600 TABLET, FILM COATED ORAL at 20:15

## 2018-08-19 RX ADMIN — HYDROCODONE BITARTRATE AND ACETAMINOPHEN 1 TABLET: 5; 325 TABLET ORAL at 22:26

## 2018-08-19 RX ADMIN — CARVEDILOL 6.25 MG: 6.25 TABLET, FILM COATED ORAL at 08:44

## 2018-08-19 RX ADMIN — COLLAGENASE SANTYL: 250 OINTMENT TOPICAL at 08:45

## 2018-08-19 RX ADMIN — AMITRIPTYLINE HYDROCHLORIDE 100 MG: 50 TABLET, FILM COATED ORAL at 20:15

## 2018-08-19 RX ADMIN — GABAPENTIN 600 MG: 600 TABLET, FILM COATED ORAL at 08:43

## 2018-08-19 RX ADMIN — PIPERACILLIN SODIUM AND TAZOBACTAM SODIUM 3.38 G: 3; .375 INJECTION, POWDER, LYOPHILIZED, FOR SOLUTION INTRAVENOUS at 14:26

## 2018-08-19 RX ADMIN — HYDROCODONE BITARTRATE AND ACETAMINOPHEN 1 TABLET: 5; 325 TABLET ORAL at 05:49

## 2018-08-19 ASSESSMENT — PAIN DESCRIPTION - PROGRESSION
CLINICAL_PROGRESSION: NOT CHANGED
CLINICAL_PROGRESSION: GRADUALLY WORSENING
CLINICAL_PROGRESSION: GRADUALLY WORSENING
CLINICAL_PROGRESSION: GRADUALLY IMPROVING
CLINICAL_PROGRESSION: GRADUALLY WORSENING
CLINICAL_PROGRESSION: NOT CHANGED

## 2018-08-19 ASSESSMENT — PAIN DESCRIPTION - PAIN TYPE
TYPE: ACUTE PAIN;CHRONIC PAIN
TYPE: ACUTE PAIN

## 2018-08-19 ASSESSMENT — PAIN SCALES - GENERAL
PAINLEVEL_OUTOF10: 3
PAINLEVEL_OUTOF10: 5
PAINLEVEL_OUTOF10: 7
PAINLEVEL_OUTOF10: 0
PAINLEVEL_OUTOF10: 3
PAINLEVEL_OUTOF10: 0
PAINLEVEL_OUTOF10: 7

## 2018-08-19 ASSESSMENT — PAIN DESCRIPTION - DESCRIPTORS
DESCRIPTORS: ACHING

## 2018-08-19 ASSESSMENT — PAIN DESCRIPTION - ORIENTATION
ORIENTATION: RIGHT

## 2018-08-19 ASSESSMENT — PAIN DESCRIPTION - LOCATION
LOCATION: FOOT
LOCATION: GENERALIZED;FOOT
LOCATION: FOOT
LOCATION: FOOT;GENERALIZED

## 2018-08-19 ASSESSMENT — PAIN DESCRIPTION - ONSET
ONSET: ON-GOING

## 2018-08-19 ASSESSMENT — PAIN DESCRIPTION - FREQUENCY
FREQUENCY: INTERMITTENT

## 2018-08-19 NOTE — PROGRESS NOTES
Podiatric Surgery Daily Progress Note  Scarlett Cueva  Subjective :   Patient seen and examined this am at the bedside. Patient denies any acute overnight events. Patient denies any pain to her right foot. Patient denies N/V/F/C/SOB. Patient denies calf pain, thigh pain, chest pain. Objective     /70   Pulse 80   Temp 99.3 °F (37.4 °C) (Oral)   Resp 20   Ht 5' 2\" (1.575 m)   Wt 183 lb 11.2 oz (83.3 kg)   SpO2 96%   BMI 33.60 kg/m²      I/O:    Intake/Output Summary (Last 24 hours) at 08/19/18 0710  Last data filed at 08/19/18 0547   Gross per 24 hour   Intake                0 ml   Output             1800 ml   Net            -1800 ml              Wt Readings from Last 3 Encounters:   08/16/18 183 lb 11.2 oz (83.3 kg)   04/30/18 186 lb (84.4 kg)   04/26/18 181 lb (82.1 kg)       LABS:    Recent Labs      08/18/18   0452  08/19/18   0606   WBC  10.5  8.2   HGB  11.5*  11.5*   HCT  34.8*  34.9*   PLT  308  314        Recent Labs      08/18/18   0452   NA  134*   K  5.0   CL  97*   CO2  27   BUN  33*   CREATININE  1.5*        Recent Labs      08/17/18   0700  08/18/18   0453   INR  0.90  0.97           LOWER EXTREMITY EXAMINATION  VASCULAR: DP and PT pulses are palpable 1/4 b/l. CFT is brisk to remaining digits of b/l feet. Skin temperature is warm to warm from proximal to distal, b/l. Increased focal erythema and calor noted to the dorsal right forefoot. Non-pitting edema noted globally to right foot.      NEUROLOGIC: Gross and protective sensation is absent b/l. MUSCULOSKELETAL: Pain with palpation to dorsal aspect of foot, where erythema is present. , has improved since admission. Muscle strength testing 5/5 to all compartments of the lower extremity, b/l. 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 DERM:   Skin coapted with sutures intact. No dehiscence, drainage, malodor, erythema, or signs of acute infection noted.             IMAGING:      RIGHT FOOT MRI       Plantar soft tissue management  -Continue NWB to right   -Discussed with patient about going to a SNF due to her non-compliance and home environment. Patient agreed to go to SNF.  -SW consulted for SNF placement  -PT/OT consulted. -NWB to right; heel weight bearing to the left    DVT Prophylaxis: lovenox  Diet: Carb control  Code Status: Full  PT/OT: f/u eval    Dispo: F/u with social work on SNF placement. Will f/u with ID recs for abx prior to discharge. Discussed assessment and plan with Dr. Monica Uribe.     Jeannette Hayes DPM   PGY-2, Pager #: [phone]

## 2018-08-19 NOTE — PROGRESS NOTES
Meds: sodium chloride flush, ammonium lactate, HYDROcodone 5 mg - acetaminophen, naloxone, diphenhydrAMINE, acetaminophen, docusate sodium, ondansetron, promethazine, glucose, glucagon (rDNA), dextrose      Intake/Output Summary (Last 24 hours) at 08/19/18 0617  Last data filed at 08/19/18 0547   Gross per 24 hour   Intake                0 ml   Output             1800 ml   Net            -1800 ml       Physical Exam Performed:    /70   Pulse 80   Temp 99.3 °F (37.4 °C) (Oral)   Resp 20   Ht 5' 2\" (1.575 m)   Wt 183 lb 11.2 oz (83.3 kg)   SpO2 96%   BMI 33.60 kg/m²     General appearance: No apparent distress. HEENT: Conjunctivae/corneas clear. Neck: No jugular venous distention. Trachea midline. Respiratory:  Sonorus wheezing in left lower lobe. Cardiovascular: RRR with normal S1/S2 without murmurs, rubs or gallops. Abdomen: Soft, non-tender, non-distended with normal bowel sounds. Musculoskeletal: No clubbing, cyanosis or edema bilaterally. Right foot bandaged. Skin: Skin color, texture, turgor normal.  No rashes or lesions. Psychiatric: Alert and oriented, thought content appropriate, normal insight  Capillary Refill: Brisk,< 3 seconds   Peripheral Pulses: +2 palpable, equal bilaterally       Labs:   Recent Labs      08/16/18   0652  08/17/18   0701  08/18/18   0452   WBC  10.7  7.0  10.5   HGB  12.2  12.3  11.5*   HCT  36.8  36.4  34.8*   PLT  323  321  308     Recent Labs      08/16/18   0652  08/17/18   0701  08/18/18   0452   NA  135*  137  134*   K  5.4*  4.3  5.0   CL  95*  98*  97*   CO2  29  28  27   BUN  31*  33*  33*   CREATININE  1.6*  1.5*  1.5*   CALCIUM  9.8  9.6  9.3     No results for input(s): AST, ALT, BILIDIR, BILITOT, ALKPHOS in the last 72 hours. Recent Labs      08/16/18   0653  08/17/18   0700  08/18/18   0453   INR  0.91  0.90  0.97     No results for input(s): CKTOTAL, TROPONINI in the last 72 hours.     Urinalysis:      Lab Results   Component Value Date    NITRU

## 2018-08-19 NOTE — PROGRESS NOTES
Pt is awake in chair. Chair wheels are locked and chair alarm is on. Call light and bedside table are within reach. Pt is A&O. VSS. Assessment is complete and documented. Pt had no concerns at this time. Will continue to monitor.

## 2018-08-20 ENCOUNTER — APPOINTMENT (OUTPATIENT)
Dept: INTERVENTIONAL RADIOLOGY/VASCULAR | Age: 53
DRG: 617 | End: 2018-08-20
Attending: PODIATRIST
Payer: MEDICARE

## 2018-08-20 VITALS
TEMPERATURE: 98.3 F | DIASTOLIC BLOOD PRESSURE: 63 MMHG | RESPIRATION RATE: 16 BRPM | OXYGEN SATURATION: 95 % | BODY MASS INDEX: 37.49 KG/M2 | HEART RATE: 71 BPM | SYSTOLIC BLOOD PRESSURE: 148 MMHG | WEIGHT: 203.71 LBS | HEIGHT: 62 IN

## 2018-08-20 LAB
ANION GAP SERPL CALCULATED.3IONS-SCNC: 12 MMOL/L (ref 3–16)
BASOPHILS ABSOLUTE: 0.1 K/UL (ref 0–0.2)
BASOPHILS RELATIVE PERCENT: 0.8 %
BUN BLDV-MCNC: 37 MG/DL (ref 7–20)
CALCIUM SERPL-MCNC: 9.6 MG/DL (ref 8.3–10.6)
CHLORIDE BLD-SCNC: 97 MMOL/L (ref 99–110)
CO2: 30 MMOL/L (ref 21–32)
CREAT SERPL-MCNC: 1.7 MG/DL (ref 0.6–1.1)
EOSINOPHILS ABSOLUTE: 0.3 K/UL (ref 0–0.6)
EOSINOPHILS RELATIVE PERCENT: 4.2 %
GFR AFRICAN AMERICAN: 38
GFR NON-AFRICAN AMERICAN: 31
GLUCOSE BLD-MCNC: 103 MG/DL (ref 70–99)
GLUCOSE BLD-MCNC: 132 MG/DL (ref 70–99)
GLUCOSE BLD-MCNC: 268 MG/DL (ref 70–99)
HCT VFR BLD CALC: 33.7 % (ref 36–48)
HEMOGLOBIN: 11.2 G/DL (ref 12–16)
INR BLD: 0.93 (ref 0.86–1.14)
LYMPHOCYTES ABSOLUTE: 2.2 K/UL (ref 1–5.1)
LYMPHOCYTES RELATIVE PERCENT: 34 %
MCH RBC QN AUTO: 30.1 PG (ref 26–34)
MCHC RBC AUTO-ENTMCNC: 33.4 G/DL (ref 31–36)
MCV RBC AUTO: 90.2 FL (ref 80–100)
MONOCYTES ABSOLUTE: 0.5 K/UL (ref 0–1.3)
MONOCYTES RELATIVE PERCENT: 7.4 %
NEUTROPHILS ABSOLUTE: 3.5 K/UL (ref 1.7–7.7)
NEUTROPHILS RELATIVE PERCENT: 53.6 %
PDW BLD-RTO: 14.9 % (ref 12.4–15.4)
PERFORMED ON: ABNORMAL
PERFORMED ON: ABNORMAL
PLATELET # BLD: 300 K/UL (ref 135–450)
PMV BLD AUTO: 8.2 FL (ref 5–10.5)
POTASSIUM SERPL-SCNC: 3.8 MMOL/L (ref 3.5–5.1)
PROTHROMBIN TIME: 10.6 SEC (ref 9.8–13)
RBC # BLD: 3.73 M/UL (ref 4–5.2)
SEDIMENTATION RATE, ERYTHROCYTE: 115 MM/HR (ref 0–30)
SODIUM BLD-SCNC: 139 MMOL/L (ref 136–145)
WBC # BLD: 6.6 K/UL (ref 4–11)

## 2018-08-20 PROCEDURE — 97166 OT EVAL MOD COMPLEX 45 MIN: CPT

## 2018-08-20 PROCEDURE — G8978 MOBILITY CURRENT STATUS: HCPCS

## 2018-08-20 PROCEDURE — 36569 INSJ PICC 5 YR+ W/O IMAGING: CPT

## 2018-08-20 PROCEDURE — 85610 PROTHROMBIN TIME: CPT

## 2018-08-20 PROCEDURE — 6370000000 HC RX 637 (ALT 250 FOR IP): Performed by: INTERNAL MEDICINE

## 2018-08-20 PROCEDURE — 97530 THERAPEUTIC ACTIVITIES: CPT

## 2018-08-20 PROCEDURE — 6360000002 HC RX W HCPCS: Performed by: STUDENT IN AN ORGANIZED HEALTH CARE EDUCATION/TRAINING PROGRAM

## 2018-08-20 PROCEDURE — 6360000002 HC RX W HCPCS

## 2018-08-20 PROCEDURE — 6370000000 HC RX 637 (ALT 250 FOR IP): Performed by: STUDENT IN AN ORGANIZED HEALTH CARE EDUCATION/TRAINING PROGRAM

## 2018-08-20 PROCEDURE — 2500000003 HC RX 250 WO HCPCS

## 2018-08-20 PROCEDURE — G8979 MOBILITY GOAL STATUS: HCPCS

## 2018-08-20 PROCEDURE — 97161 PT EVAL LOW COMPLEX 20 MIN: CPT

## 2018-08-20 PROCEDURE — 80048 BASIC METABOLIC PNL TOTAL CA: CPT

## 2018-08-20 PROCEDURE — C1894 INTRO/SHEATH, NON-LASER: HCPCS

## 2018-08-20 PROCEDURE — 85652 RBC SED RATE AUTOMATED: CPT

## 2018-08-20 PROCEDURE — 99233 SBSQ HOSP IP/OBS HIGH 50: CPT | Performed by: INTERNAL MEDICINE

## 2018-08-20 PROCEDURE — 77001 FLUOROGUIDE FOR VEIN DEVICE: CPT

## 2018-08-20 PROCEDURE — 76937 US GUIDE VASCULAR ACCESS: CPT

## 2018-08-20 PROCEDURE — G8988 SELF CARE GOAL STATUS: HCPCS

## 2018-08-20 PROCEDURE — 02HV33Z INSERTION OF INFUSION DEVICE INTO SUPERIOR VENA CAVA, PERCUTANEOUS APPROACH: ICD-10-PCS | Performed by: RADIOLOGY

## 2018-08-20 PROCEDURE — 2580000003 HC RX 258: Performed by: STUDENT IN AN ORGANIZED HEALTH CARE EDUCATION/TRAINING PROGRAM

## 2018-08-20 PROCEDURE — 36415 COLL VENOUS BLD VENIPUNCTURE: CPT

## 2018-08-20 PROCEDURE — 85025 COMPLETE CBC W/AUTO DIFF WBC: CPT

## 2018-08-20 PROCEDURE — 97535 SELF CARE MNGMENT TRAINING: CPT

## 2018-08-20 PROCEDURE — G8987 SELF CARE CURRENT STATUS: HCPCS

## 2018-08-20 RX ORDER — SODIUM CHLORIDE 0.9 % (FLUSH) 0.9 %
10 SYRINGE (ML) INJECTION PRN
Status: DISCONTINUED | OUTPATIENT
Start: 2018-08-20 | End: 2018-08-20 | Stop reason: HOSPADM

## 2018-08-20 RX ORDER — SODIUM CHLORIDE 0.9 % (FLUSH) 0.9 %
10 SYRINGE (ML) INJECTION EVERY 12 HOURS SCHEDULED
Status: DISCONTINUED | OUTPATIENT
Start: 2018-08-20 | End: 2018-08-20 | Stop reason: HOSPADM

## 2018-08-20 RX ORDER — METHADONE HYDROCHLORIDE 10 MG/1
140 TABLET ORAL DAILY
Qty: 140 TABLET | Refills: 0 | Status: SHIPPED | OUTPATIENT
Start: 2018-08-21 | End: 2018-08-31

## 2018-08-20 RX ORDER — LIDOCAINE HYDROCHLORIDE 10 MG/ML
5 INJECTION, SOLUTION EPIDURAL; INFILTRATION; INTRACAUDAL; PERINEURAL ONCE
Status: DISCONTINUED | OUTPATIENT
Start: 2018-08-20 | End: 2018-08-20 | Stop reason: HOSPADM

## 2018-08-20 RX ORDER — LINEZOLID 600 MG/1
600 TABLET, FILM COATED ORAL EVERY 12 HOURS SCHEDULED
Qty: 84 TABLET | Refills: 0 | Status: SHIPPED | OUTPATIENT
Start: 2018-08-20 | End: 2018-10-01

## 2018-08-20 RX ADMIN — CARVEDILOL 6.25 MG: 6.25 TABLET, FILM COATED ORAL at 09:05

## 2018-08-20 RX ADMIN — METHADONE HYDROCHLORIDE 140 MG: 10 TABLET ORAL at 09:05

## 2018-08-20 RX ADMIN — COLLAGENASE SANTYL: 250 OINTMENT TOPICAL at 09:12

## 2018-08-20 RX ADMIN — GABAPENTIN 600 MG: 600 TABLET, FILM COATED ORAL at 09:05

## 2018-08-20 RX ADMIN — LINEZOLID 600 MG: 600 TABLET, FILM COATED ORAL at 09:05

## 2018-08-20 RX ADMIN — PIPERACILLIN SODIUM AND TAZOBACTAM SODIUM 3.38 G: 3; .375 INJECTION, POWDER, LYOPHILIZED, FOR SOLUTION INTRAVENOUS at 14:23

## 2018-08-20 RX ADMIN — HYDROCODONE BITARTRATE AND ACETAMINOPHEN 1 TABLET: 5; 325 TABLET ORAL at 12:02

## 2018-08-20 RX ADMIN — INSULIN LISPRO 3 UNITS: 100 INJECTION, SOLUTION INTRAVENOUS; SUBCUTANEOUS at 12:02

## 2018-08-20 RX ADMIN — ATORVASTATIN CALCIUM 20 MG: 20 TABLET, FILM COATED ORAL at 09:05

## 2018-08-20 RX ADMIN — PIPERACILLIN SODIUM AND TAZOBACTAM SODIUM 3.38 G: 3; .375 INJECTION, POWDER, LYOPHILIZED, FOR SOLUTION INTRAVENOUS at 06:06

## 2018-08-20 RX ADMIN — BACITRACIN ZINC AND POLYMYXIN B SULFATE: 500; 10000 OINTMENT TOPICAL at 09:06

## 2018-08-20 RX ADMIN — FUROSEMIDE 40 MG: 40 TABLET ORAL at 09:06

## 2018-08-20 RX ADMIN — ENOXAPARIN SODIUM 40 MG: 40 INJECTION SUBCUTANEOUS at 09:05

## 2018-08-20 ASSESSMENT — PAIN DESCRIPTION - PROGRESSION
CLINICAL_PROGRESSION: GRADUALLY IMPROVING
CLINICAL_PROGRESSION: NOT CHANGED
CLINICAL_PROGRESSION: GRADUALLY IMPROVING
CLINICAL_PROGRESSION: NOT CHANGED
CLINICAL_PROGRESSION: GRADUALLY IMPROVING

## 2018-08-20 ASSESSMENT — PAIN SCALES - GENERAL
PAINLEVEL_OUTOF10: 4
PAINLEVEL_OUTOF10: 5
PAINLEVEL_OUTOF10: 6
PAINLEVEL_OUTOF10: 5
PAINLEVEL_OUTOF10: 7

## 2018-08-20 ASSESSMENT — PAIN DESCRIPTION - FREQUENCY
FREQUENCY: INTERMITTENT
FREQUENCY: CONTINUOUS

## 2018-08-20 ASSESSMENT — PAIN DESCRIPTION - DESCRIPTORS
DESCRIPTORS: ACHING
DESCRIPTORS: ACHING

## 2018-08-20 ASSESSMENT — PAIN DESCRIPTION - LOCATION
LOCATION: FOOT
LOCATION: FOOT

## 2018-08-20 ASSESSMENT — PAIN DESCRIPTION - ORIENTATION
ORIENTATION: LEFT;RIGHT
ORIENTATION: RIGHT;LEFT

## 2018-08-20 ASSESSMENT — PAIN DESCRIPTION - PAIN TYPE
TYPE: ACUTE PAIN
TYPE: ACUTE PAIN

## 2018-08-20 ASSESSMENT — PAIN DESCRIPTION - ONSET
ONSET: ON-GOING
ONSET: PROGRESSIVE

## 2018-08-20 NOTE — PLAN OF CARE
Problem: Pain:  Goal: Pain level will decrease  Pain level will decrease   Outcome: Ongoing  Pt had norco this morning and pain decreased.  Will continue to monitor

## 2018-08-20 NOTE — PROCEDURES
Order noted for PICC, unable to be done today d/t time, d/w IR Maryuri Gramajo regarding them completing arm PICC, possible ER cardiac pt coming to them, may get done per IR today if not, PICC Team can place in AM.

## 2018-08-20 NOTE — PLAN OF CARE
Problem: Pain:  Goal: Pain level will decrease  Pain level will decrease   Outcome: Ongoing  Pt has had left and right foot pain today. Pt received norco this AM. Pt pain was a 6 now at a 5/10. Pt denied any other pain interventions. Pt is up in chair and legs are propped up on a pillow and elevated.

## 2018-08-20 NOTE — PROGRESS NOTES
Podiatric Surgery Daily Progress Note  Shelia Madeleine  Subjective :   Patient seen and examined this am at the bedside. Patient denies any acute overnight events. Patient denies any pain to her right foot. Patient denies N/V/F/C/SOB. Patient denies calf pain, thigh pain, chest pain. Objective     /69   Pulse 70   Temp 98.3 °F (36.8 °C) (Oral)   Resp 20   Ht 5' 2\" (1.575 m)   Wt 203 lb 11.3 oz (92.4 kg)   SpO2 95%   BMI 37.26 kg/m²      I/O:    Intake/Output Summary (Last 24 hours) at 08/20/18 0814  Last data filed at 08/19/18 1428   Gross per 24 hour   Intake              620 ml   Output              650 ml   Net              -30 ml              Wt Readings from Last 3 Encounters:   08/20/18 203 lb 11.3 oz (92.4 kg)   04/30/18 186 lb (84.4 kg)   04/26/18 181 lb (82.1 kg)       LABS:    Recent Labs      08/19/18   0606  08/20/18   0435   WBC  8.2  6.6   HGB  11.5*  11.2*   HCT  34.9*  33.7*   PLT  314  300        Recent Labs      08/20/18   0434   NA  139   K  3.8   CL  97*   CO2  30   BUN  37*   CREATININE  1.7*        Recent Labs      08/19/18   0606  08/20/18   0435   INR  0.94  0.93           LOWER EXTREMITY EXAMINATION  VASCULAR: DP and PT pulses are palpable 1/4 b/l. CFT is brisk to remaining digits of b/l feet. Skin temperature is warm to warm from proximal to distal, b/l. Increased focal erythema and calor noted to the dorsal right forefoot. Non-pitting edema noted globally to right foot.      NEUROLOGIC: Gross and protective sensation is absent b/l. MUSCULOSKELETAL: Pain with palpation to dorsal aspect of foot, where erythema is present. , has improved since admission. Muscle strength testing 5/5 to all compartments of the lower extremity, b/l. 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 DERM:   Skin coapted with sutures intact. No dehiscence, drainage, malodor, erythema, or signs of acute infection noted.             IMAGING:      RIGHT FOOT MRI       Plantar soft tissue ulcer splint left in tact. -ID following recs appreciated for D/C. -IM following for medical management  -Continue NWB to right   -Discussed with patient about going to a SNF due to her non-compliance and home environment. Patient agreed to go to SNF.  -SW consulted for SNF placement will f/u   -PT/OT consulted. -NWB to right; heel weight bearing to the left    DVT Prophylaxis: lovenox  Diet: Carb control  Code Status: Full  PT/OT: f/u eval    Dispo: F/u with social work on SNF placement. Will f/u with ID recs for abx prior to discharge hopefully today. Discussed assessment and plan with Dr. Beverly Gore.     Andrea Whittington DPM   Podiatric Resident, PGY-2  Pager: [phone]  8/20/2018, 8:21 AM

## 2018-08-20 NOTE — PROGRESS NOTES
Occupational Therapy   Occupational Therapy Initial Assessment/Treatment  Date: 2018   Patient Name: Marianne Reyes  MRN: [id number]     : 1965    Date of Service: 2018    Discharge Recommendations:    Marianne Reyes scored a 18/24 on the AM-PAC ADL Inpatient form. Current research shows that an AM-PAC score of 17 or less is typically not associated with a discharge to the patient's home setting. Based on the patients AM-PAC score and their current ADL deficits, it is recommended that the patient have 3-5 sessions per week of Occupational Therapy at d/c to increase the patients independence. OT Equipment Recommendations  Equipment Needed: No        Treatment Diagnosis: Impaired ADL and functional mobility      Restrictions  Position Activity Restriction  Other position/activity restrictions: NWB right LE, heel WB left LE    Subjective   General  Chart Reviewed: Yes  Patient assessed for rehabilitation services?: Yes  Additional Pertinent Hx: Pt admitted 18 from podiatry clinic with increased right foot pain. 18 RIGHT FOOT DEBRIDEMENT INCISION AND DRAINAGE, PARTIAL 5TH RAY AMPUTATION            x-ray Rt foot= Soft tissue swelling and ulceration along the distal lateral foot. No convincing evidence of osteomyelitis or acute osseous abnormality. x-ray Lft foot= Previous first digit amputation. No acute osseous abnormality. PMH includes: asthma, CTs, DM, diabetic neuropathy, DVT, ETOH abuse, HTN, MRSA, back pain, pancreatitis Lft toe amp, multiple foot surgeries  Family / Caregiver Present: No  Referring Practitioner: Lillie Sam DPM  Diagnosis: Cellulitis Rt foot  Subjective  Subjective: Pt in bed upon entry. I miss my animals.   Pain Assessment  Patient Currently in Pain: Yes (right foot 4/10)     Social/Functional History  Social/Functional History  Lives With: Family (2 daughters & ex-)  Type of Home: House  Home Layout: Able to Live on Main level with bedroom/bathroom  Home Access: Stairs to enter with rails  Entrance Stairs - Number of Steps: 4 + 2 with one rail on right  Entrance Stairs - Rails: Right  Bathroom Shower/Tub: Tub/Shower unit  Bathroom Toilet: Standard  Bathroom Accessibility: Accessible  Home Equipment: Rolling walker, Cane  ADL Assistance: Independent (sponge bath)  Homemaking Assistance:  (daughters & ex- do cooking, laundry, cleaning.)  Ambulation Assistance: Independent (st cane, occ used RW)  Transfer Assistance: Independent  Active : Yes  Occupation: On disability  Additional Comments: no falls       Objective   Vision: Within Functional Limits  Hearing: Within functional limits    Orientation  Overall Orientation Status: Within Functional Limits     Balance  Sitting Balance: Supervision  Standing Balance: Moderate assistance (static standing at walker)  Toilet Transfers  Toilet - Technique: Stand pivot  Equipment Used: Standard bedside commode  Toilet Transfer: Dependent/Total (mod assist of 2)  ADL  Feeding: Independent  Grooming: Setup  LE Dressing: Minimal assistance (Req assist for pants over hips. Pt not able to wear socks. secondary to dressings.)  Toileting: Minimal assistance  Tone RUE  RUE Tone: Normotonic  Tone LUE  LUE Tone: Normotonic  Coordination  Movements Are Fluid And Coordinated: Yes     Bed mobility  Supine to Sit: Modified independent (pt using rail)  Transfers  Sit Pivot Transfers: Dependent/Total (mod + min assist bed to chair. Mod assist of 2 with walker.)     Cognition  Overall Cognitive Status: WFL                 LUE AROM (degrees)  LUE AROM : WFL  RUE AROM (degrees)  RUE AROM : WFL  LUE Strength  Gross LUE Strength: WFL  RUE Strength  Gross RUE Strength: WFL          Treatment included ADL and transfer training. Assessment   Performance deficits / Impairments: Decreased functional mobility ; Decreased ADL status; Decreased endurance;Decreased balance  Assessment: Pt presents with decreased [id number]

## 2018-08-20 NOTE — PLAN OF CARE
Problem: Pain:  Goal: Pain level will decrease  Pain level will decrease   Outcome: Ongoing  Medicated with Norco one tab for complaints of right foot pain, vital signs stable, afebrile. Loosened ace wrap dressing, complained it was too tight. Dressing remains C/D/I. Will continue to monitor alteration in comfort.

## 2018-08-20 NOTE — PLAN OF CARE
Problem: Musculor/Skeletal Functional Status  Intervention: OT Evaluation/treatment  Increase independence w/ADL and functional transfers.

## 2018-08-20 NOTE — PLAN OF CARE
Problem: Musculor/Skeletal Functional Status  Intervention: PT Evaluation/treatment  Improve functional mobility.

## 2018-08-20 NOTE — PROGRESS NOTES
LE  Vision/Hearing  Vision: Within Functional Limits  Hearing: Within functional limits     Subjective  General  Chart Reviewed: Yes  Patient assessed for rehabilitation services?: Yes  Additional Pertinent Hx: PMH: DM, HTN. Pt admitted for right foot osteomyelitis. S/P partial right 5th ray amputation 8/16. Family / Caregiver Present: No  Referring Practitioner: Sarah Beth Mast  Referral Date : 08/18/18  Diagnosis: right foot osteomyelitits  Follows Commands: Within Functional Limits  Subjective  Subjective: Pt supine in bed & agreeable to PT.   Pain Screening  Patient Currently in Pain: Yes (right foot 4/10)  Vital Signs  Patient Currently in Pain: Yes (right foot 4/10)       Orientation  Orientation  Overall Orientation Status: Within Normal Limits    Social/Functional History  Social/Functional History  Lives With: Family (2 daughters & ex-)  Type of Home: House  Home Layout: Able to Live on Main level with bedroom/bathroom  Home Access: Stairs to enter with rails  Entrance Stairs - Number of Steps: 4 + 2 with one rail on right  Entrance Stairs - Rails: Right  Bathroom Shower/Tub: Tub/Shower unit  Bathroom Toilet: Standard  Bathroom Accessibility: Accessible  Home Equipment: Rolling walker, Cane  ADL Assistance: Independent (sponge bath)  Homemaking Assistance:  (daughters & ex- do cooking, laundry, cleaning.)  Ambulation Assistance: Independent (st cane, occ used RW)  Transfer Assistance: Independent  Active : Yes  Occupation: On disability  Additional Comments: no falls  Objective          AROM RLE (degrees)  RLE AROM: WNL  RLE General AROM: ankle NT d/t splint  AROM LLE (degrees)  LLE AROM : WNL  Strength RLE  Strength RLE: WFL  Comment: ankle NT  Strength LLE  Strength LLE: WFL  Comment: ankle NT        Bed mobility  Supine to Sit: Modified independent (pt using rail)  Transfers  Sit to Stand: Dependent/Total (mod A x 2 from bed or BSC to RW. cues for technique & to maintian WB status.)  Stand to sit: Moderate Assistance (verbal cues)  Stand Pivot Transfers: Dependent/Total (mod A x 2 BSC->chair with RW, cues for WB status. Pt able to maintain NWB right LE, however unable to maintain heel WB left LE despite cues.)  Squat Pivot Transfers: Dependent/Total (mod A x 1, min A x 1 bed->chair, then chair->BSC)  Ambulation  Ambulation?: No (unable d/t level of assist required for standing balance (NWB right LE) & unable to maintain heel WB left LE.)     Balance  Sitting - Static: Good  Sitting - Dynamic: Good  Standing - Static: Poor (mod A x 1 with RW & verbal cues.)        Assessment   Body structures, Functions, Activity limitations: Decreased functional mobility   Assessment: Pt functioning below her baseline s/p right foot surgery & new WB restrictions. Pt is unsafe for D/C home & would benefit from continued inpt PT upon D/C. Will cont PT while here to address above goals. Treatment Diagnosis: impaired functional mobility s/p right foot sx  Prognosis: Good  Decision Making: Low Complexity  Patient Education: role of PT, transfers, WB restrictions, importance of OOB. REQUIRES PT FOLLOW UP: Yes  Activity Tolerance  Activity Tolerance: Patient Tolerated treatment well         Plan   Plan  Times per week: 2-5  Current Treatment Recommendations: Functional Mobility Training, Transfer Training, Balance Training, Safety Education & Training, Patient/Caregiver Education & Training  Safety Devices  Type of devices: Call light within reach, Chair alarm in place, Left in chair, Nurse notified    G-Code  PT G-Codes  Functional Limitation: Mobility: Walking and moving around  Mobility: Walking and Moving Around Current Status (): At least 60 percent but less than 80 percent impaired, limited or restricted  Mobility: Walking and Moving Around Goal Status ():  At least 40 percent but less than 60 percent impaired, limited or restricted                                           AM-PAC Score  AM-PAC Inpatient Mobility Raw Score : 14  AM-PAC Inpatient T-Scale Score : 38.1  Mobility Inpatient CMS 0-100% Score: 61.29  Mobility Inpatient CMS G-Code Modifier : CL          Goals  Short term goals  Time Frame for Short term goals: D/C  Short term goal 1: sit<->stand min A x 1-2  Short term goal 2: standing balance with walker CGA x 1 min  Short term goal 3: bed<->chair min A x 1-2  Patient Goals   Patient goals : to be independent       Therapy Time   Individual Concurrent Group Co-treatment   Time In 2586         Time Out 1015         Minutes 38               This note will serve as a discharge summary if pt discharged prior to next treatment session.   Ryan Whitmore, PT

## 2018-08-20 NOTE — PROGRESS NOTES
ID Follow-up NOTE  RESIDENT NOTE - reviewed / edited, attending note at bottom    CC:   Osteomyelitis of right foot   Antibiotics: Zosyn and Linezolid     Admit Date: 8/14/2018  Hospital Day: 7    Subjective:     Patient was seen and examined this AM. No acute events overnight. She is POD #3 from right foot debridement and partial 5th ray amputation. She reports feeling well and denies fever/chills, nausea/vomiting, shortness of breath or chest pain. Objective:     Patient Vitals for the past 8 hrs:   BP Temp Temp src Pulse Resp SpO2 Weight   08/20/18 0618 - - - - - - 203 lb 11.3 oz (92.4 kg)   08/20/18 0330 132/69 98.3 °F (36.8 °C) Oral 70 20 95 % -   08/20/18 0013 128/66 98.2 °F (36.8 °C) Oral 75 20 96 % -     I/O last 3 completed shifts: In: 620 [P.O.:620]  Out: 650 [Urine:650]  No intake/output data recorded. EXAM:  GENERAL: No apparent distress.     HEENT: Membranes moist, no oral lesion  NECK:  Supple  LUNGS: Clear b/l, no rales, no dullness  CARDIAC: RRR, no murmur appreciated  ABD:  + BS, soft / NT  EXT:  Right foot wrapping present; clean and dry   NEURO: No focal neurologic findings  PSYCH: Orientation, sensorium, mood normal  LINES:  Peripheral iv       Data Review:  Lab Results   Component Value Date    WBC 6.6 08/20/2018    HGB 11.2 (L) 08/20/2018    HCT 33.7 (L) 08/20/2018    MCV 90.2 08/20/2018     08/20/2018     Lab Results   Component Value Date    CREATININE 1.7 (H) 08/20/2018    BUN 37 (H) 08/20/2018     08/20/2018    K 3.8 08/20/2018    CL 97 (L) 08/20/2018    CO2 30 08/20/2018       Hepatic Function Panel: Lab Results   Component Value Date    ALKPHOS 218 08/14/2018    ALT 17 08/14/2018    AST 14 08/14/2018    PROT 7.0 08/14/2018    PROT 6.6 07/21/2011    BILITOT <0.2 08/14/2018    BILIDIR <0.2 03/27/2018    IBILI see below 03/27/2018    LISSY 3.5 08/14/2018       MICRO:  8/15 - Wound culture growing Enterobacter cloacae  8/17 - OR tissue culture growing Enterobacter and linezolid   -MRI showing evidence of osteomyelitis   -Wound culture growing Enterobacter cloacae   -Podiatry following; OR today     Plan:     Can discontinue linezolid   Continue Zosyn   Wound care per podiatry     Discussed with attending, MD Abby Newsome MD, PGY-1     Addendum to Resident Progress note:  Pt seen, examined and evaluated. I have reviewed the current history, physical findings, labs and assessment and plan and agree with note as documented by resident (Dr. Patricia Mcdowell).    44 yo woman with hx obesity, DM, neuropathy.    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  L foot wound 10/2017 MRSA, E faecalis.     Admit 3/26 with lethargy.   X-ray with L hallux and 2nd toe bone loss.    On exam, LE venous stasis, edema, L foot redness, L hallux stump with dehiscence, warmth  MRI with 'subtle . .. signal alteration', Culture - G     Admit 8/14 from podiatry clinic with R foot pain.  ESR 80, CRP 21.4.    Wound cult sent - GS 2+GNR, 1+GPC.     R foot x-ray - no OM.    Started on zosyn + vancomycin     8/15 Culture - light mixed skin sukhjinder, heavy E cloacae, moderate E coli  ENTEROBACTER CLOACAE   Antibiotic Interpretation ISAAC Unit   amoxicillin-clavulanate Resistant >16/8 mcg/mL   ampicillin Resistant >16 mcg/mL   ceFAZolin Resistant >16 mcg/mL   cefOXitin Resistant >16 mcg/mL   cefTRIAXone Resistant 8 mcg/mL   cefepime Sensitive <=2 mcg/mL   cefotaxime Sensitive 8 mcg/mL   cefuroxime Resistant >16 mcg/mL   ciprofloxacin Sensitive <=1 mcg/mL   gentamicin Sensitive <=4 mcg/mL   meropenem Sensitive <=1 mcg/mL   piperacillin-tazobactam Sensitive <=16 mcg/mL   trimethoprim-sulfamethoxazole Sensitive <=2/38 mcg/mL    ESCHERICHIA COLI   Antibiotic Interpretation ISAAC Unit   amoxicillin-clavulanate Intermediate 16/8 mcg/mL   ampicillin Resistant >16 mcg/mL   ceFAZolin Resistant >16 mcg/mL   cefOXitin Sensitive <=8 mcg/mL   cefTRIAXone Sensitive <=1 mcg/mL   cefepime Sensitive <=2 mcg/mL

## 2018-08-21 LAB
BLOOD BANK DISPENSE STATUS: NORMAL
BLOOD BANK PRODUCT CODE: NORMAL
BPU ID: NORMAL
DESCRIPTION BLOOD BANK: NORMAL
EKG ATRIAL RATE: 71 BPM
EKG DIAGNOSIS: NORMAL
EKG P AXIS: 65 DEGREES
EKG P-R INTERVAL: 164 MS
EKG Q-T INTERVAL: 392 MS
EKG QRS DURATION: 90 MS
EKG QTC CALCULATION (BAZETT): 425 MS
EKG R AXIS: 25 DEGREES
EKG T AXIS: 45 DEGREES
EKG VENTRICULAR RATE: 71 BPM

## 2018-08-27 ENCOUNTER — TELEPHONE (OUTPATIENT)
Dept: INTERNAL MEDICINE CLINIC | Age: 53
End: 2018-08-27

## 2018-08-28 ENCOUNTER — OFFICE VISIT (OUTPATIENT)
Dept: INTERNAL MEDICINE CLINIC | Age: 53
End: 2018-08-28
Payer: MEDICARE

## 2018-08-28 DIAGNOSIS — S91.302D OPEN WOUND OF LEFT FOOT, SUBSEQUENT ENCOUNTER: Primary | ICD-10-CM

## 2018-08-28 PROCEDURE — 99213 OFFICE O/P EST LOW 20 MIN: CPT | Performed by: STUDENT IN AN ORGANIZED HEALTH CARE EDUCATION/TRAINING PROGRAM

## 2018-08-31 ENCOUNTER — TELEPHONE (OUTPATIENT)
Dept: INTERNAL MEDICINE CLINIC | Age: 53
End: 2018-08-31

## 2018-09-07 ENCOUNTER — OFFICE VISIT (OUTPATIENT)
Dept: INTERNAL MEDICINE CLINIC | Age: 53
End: 2018-09-07
Payer: MEDICARE

## 2018-09-07 DIAGNOSIS — Z51.89 VISIT FOR WOUND CARE: Primary | ICD-10-CM

## 2018-09-07 LAB
GLUCOSE BLD-MCNC: 69 MG/DL (ref 70–99)
GLUCOSE BLD-MCNC: 71 MG/DL (ref 70–99)
GLUCOSE BLD-MCNC: 80 MG/DL (ref 70–99)
PERFORMED ON: ABNORMAL
PERFORMED ON: NORMAL
PERFORMED ON: NORMAL

## 2018-09-07 PROCEDURE — 99213 OFFICE O/P EST LOW 20 MIN: CPT | Performed by: STUDENT IN AN ORGANIZED HEALTH CARE EDUCATION/TRAINING PROGRAM

## 2018-09-07 NOTE — PROGRESS NOTES
.  Department of Podiatry  Resident Progress Note    Troy Kellogg  Allergies: Sulfa antibiotics    SUBJECTIVE  The is a 52 y.o. female who presents to clinic today for post-operative visit #2 s/p 5th ray amputation on the right. Patient states she has been tolerating the pain. Patient has remained in her posterior splint on the right and DSD on the left without any problems. Patient states she does not like the facility she is currently staying at. Patient denies any n/v/c/sob/cp/f and no other pedal complaints at this time. Past Medical History:         Diagnosis Date    Amenorrhea     Anomalies of nails     Asthma 5/14/04    Athlete's foot 8/2010    Bacterial vaginosis 04/2008    Carpal tunnel syndrome 5/2007    Diabetes mellitus type II 08/2007    Diabetic neuropathy ([facility] [address] 8/10    DVT (deep venous thrombosis) ([facility] [address] 3/2004    Dyslipidemia 5/2009    Dyspareunia 05/2009    ETOH abuse 3/04/2007    Feet clawing     HTN (hypertension)     Hx of blood clots     Hyperlipidemia     MRSA (methicillin resistant staph aureus) culture positive 11/06/2017; 11/17/2017    foot; leg     Neuropathy 05/2009    polyneuropathy    Pain, back 04/2008    Pain, eye, right 5/14/04    Pancreatitis 5/14/04    Pseudocyst, pancreas 5/14/04    Scalp lesion 08/2007    Tinea pedis     Tobacco abuse 03/2008    Vaginal bleeding, abnormal 6/2007       REVIEW OF SYSTEMS:  See HPI above. OBJECTIVE    VASCULAR: DP and PT pulses are palpable 1/4 b/l. CFT is brisk x8 digits. Skin temperature is warm to warm from proximal to distal, b/l. No increased focal erythema and calor noted to n/l feet    NEUROLOGIC: Gross and protective sensation is diminished b/l. DERMATOLOGIC:     LEFT:   Wound located to the plantar aspect of the 1st MPJ that is full thickness with hyperkeratotic rim and maceration noted. Wound measures approximately 1cm in diameter. No malodor noted. No purulent drainage noted.     Previous wound measured:

## 2018-09-07 NOTE — PROGRESS NOTES
OUTPATIENT SPECIALTY PODIATRY VISIT      Nursing Progress Note      September 7, 2018  Mitra Machado    Patient description of the problem: Wound check  Observations:  DSD on both feet    Ambulates: non ambulatory    Assistive Devices: wheelchair    Fall History: No    Foot Hygiene: good    Foot Wear Proper: Yes    Impaired Skin Integrity: Yes      Pain Assessment:    Education Assessment:    Identify the learner who is being assessed for education:  patient                    Ability to Learn:  Exhibits ability to grasp concepts and respond to questions: Medium  Ready to Learn: Yes  calm   Preferred Method of Learning:  written  Barriers to Learning: Verbalizes interest  Special Considerations due to cultural, Jewish, spiritual beliefs:  No  Language:  English  :  No    Comments:  Has been non weight bearing    [employer]  4:29 PM 9/7/2018

## 2018-09-07 NOTE — PROGRESS NOTES
2018    Fabiana Germain (:  1965) is a 52 y.o. female, here for evaluation of the following medical concerns:    HPI      Review of Systems    Prior to Visit Medications    Medication Sig Taking? Authorizing Provider   rivaroxaban (XARELTO) 15 MG TABS tablet One tablet daily by mouth for 30 days. Jesusita Martinez DPM   insulin glargine (BASAGLAR KWIKPEN) 100 UNIT/ML injection pen Inject 35 Units into the skin nightly  Jesusita Mota MD   insulin lispro (HUMALOG) 100 UNIT/ML pen Inject 10 Units into the skin 3 times daily (with meals)  Jesusita Mota MD   insulin lispro (HUMALOG) 100 UNIT/ML pen Inject 0-6 Units into the skin 3 times daily (with meals)  Jesusita Mota MD   linezolid (ZYVOX) 600 MG tablet Take 1 tablet by mouth every 12 hours  Jelly Neff MD   aspirin 325 MG tablet Take 325 mg by mouth daily  Historical Provider, MD   amitriptyline (ELAVIL) 100 MG tablet Take 1 tablet by mouth nightly  Heena Perkins MD   gabapentin (NEURONTIN) 800 MG tablet Take 1 tablet by mouth 4 times daily for 90 days. Liz Mckeon MD   blood glucose test strips (TRUE METRIX BLOOD GLUCOSE TEST) strip 1 each by In Vitro route 2 times daily As needed.   Mary Duke MD   Lancets MISC 1 each by Does not apply route 2 times daily PHARMACY MAY SUBSTITUTE TO TRUE METRIX LANCETS  Mary Duke MD   nicotine (NICODERM CQ) 14 MG/24HR Place 1 patch onto the skin every 24 hours  Chiquita Bumpers, DPM   omeprazole (PRILOSEC) 20 MG delayed release capsule Take 1 capsule by mouth Daily  Jaelyn Arizmendi MD   atorvastatin (LIPITOR) 20 MG tablet Take 1 tablet by mouth daily  Santos Negron MD   carvedilol (COREG) 6.25 MG tablet Take 1 tablet by mouth 2 times daily  Preet Hong MD   furosemide (LASIX) 40 MG tablet Take 1 tablet by mouth 2 times daily  Preet Hong MD   Gauze Pads & Dressings MISC Please dispense 4x8 guaze, kerlix, and ace  Articorly Martinez DPM   ammonium lactate (LAC-HYDRIN) 12 % lotion Apply topically daily.  Elliott Hernandez DPM   Accu-Chek Softclix Lancets MISC 1 strip by Does not apply route 2 times daily Check before breakfast and before going to bed  Marine Martinez MD   Insulin Pen Needle 31G X 5 MM MISC 1 each by Does not apply route daily  Marine Martinez MD   METHADONE HCL PO Take 146 mg by mouth daily  Historical Provider, MD        Allergies   Allergen Reactions    Sulfa Antibiotics Itching       Past Medical History:   Diagnosis Date    Amenorrhea     Anomalies of nails     Asthma 04    Athlete's foot 2010    Bacterial vaginosis 2008    Carpal tunnel syndrome 2007    Diabetes mellitus type II 2007    Diabetic neuropathy ([facility] [address] 8/10    DVT (deep venous thrombosis) ([facility] [address] 3/2004    Dyslipidemia 2009    Dyspareunia 2009    ETOH abuse 3/04/2007    Feet clawing     HTN (hypertension)     Hx of blood clots     Hyperlipidemia     MRSA (methicillin resistant staph aureus) culture positive 2017; 2017    foot; leg     Neuropathy 2009    polyneuropathy    Pain, back 2008    Pain, eye, right 04    Pancreatitis 04    Pseudocyst, pancreas 04    Scalp lesion 2007    Tinea pedis     Tobacco abuse 2008    Vaginal bleeding, abnormal 2007       Past Surgical History:   Procedure Laterality Date     SECTION  unknown    OTHER SURGICAL HISTORY Left 2016    I & D left foot    OTHER SURGICAL HISTORY Right 10/20/2017    RIGHT GASTROC LENGTHENING ENDOSCOPIC, INJECTION OF AMNI GRAFT    OTHER SURGICAL HISTORY Right 2018    Diabetic foot ulcer I&D w/ integra graft application    MA DEBRIDEMENT, SKIN, SUB-Q TISSUE,MUSCLE,BONE,=<20 SQ CM Right 2018    RIGHT FOOT DEBRIDEMENT INCISION AND DRAINAGE, PARTIAL 5TH RAY AMPUTATION performed by Braden Phillips DPM at [address] [facility] [address]  6/2813    cryotherapy done on lesion    TOE AMPUTATION Left 2017    AMPUTATION LEFT GREAT TOE

## 2018-09-14 ENCOUNTER — OFFICE VISIT (OUTPATIENT)
Dept: INTERNAL MEDICINE CLINIC | Age: 53
End: 2018-09-14
Payer: MEDICARE

## 2018-09-14 DIAGNOSIS — S91.302D OPEN WOUND OF LEFT FOOT, SUBSEQUENT ENCOUNTER: Primary | ICD-10-CM

## 2018-09-14 PROCEDURE — 99213 OFFICE O/P EST LOW 20 MIN: CPT | Performed by: STUDENT IN AN ORGANIZED HEALTH CARE EDUCATION/TRAINING PROGRAM

## 2018-09-14 NOTE — PROGRESS NOTES
OUTPATIENT SPECIALTY PODIATRY VISIT      Nursing Progress Note      September 14, 2018  Kervin Nicholson    Patient description of the problem: bilateral wound check    Observations: Wounds getting smaller . Healing    Ambulates:  assistance    Gait:      Assistive Devices: wheelchair    Fall History: No    Foot Hygiene: good    Foot Wear Proper: Yes    Impaired Skin Integrity: Yes      Pain Assessment:  Pain Present: no  Pain Score: 0/10  Pain Quality/Description:   Pain Onset:   ago  Pain Goal of patient: /10    Education Assessment:    Identify the learner who is being assessed for education:  patient                    Ability to Learn:  Exhibits ability to grasp concepts and respond to questions: Medium  Ready to Learn: No  calm   Preferred Method of Learning:  written  Barriers to Learning: Verbalizes interest  Special Considerations due to cultural, Muslim, spiritual beliefs:  No  Language:  English  :  No    Comments:  Dressings intact.     Theo Patch Page  1:40 PM 9/14/2018

## 2018-09-14 NOTE — PROGRESS NOTES
.  Department of Podiatry  Resident Progress Note    Marianne Reyes  Allergies: Sulfa antibiotics    SUBJECTIVE  The is a 52 y.o. female who presents to clinic today for post-operative visit #2 s/p 5th ray amputation on the right. Patient states she has been tolerating the pain. Patient has remained in her posterior splint on the right and DSD on the left without any problems. Patient denies any n/v/c/sob/cp/f and no other pedal complaints at this time. Past Medical History:         Diagnosis Date    Amenorrhea     Anomalies of nails     Asthma 5/14/04    Athlete's foot 8/2010    Bacterial vaginosis 04/2008    Carpal tunnel syndrome 5/2007    Diabetes mellitus type II 08/2007    Diabetic neuropathy ([facility] [address] 8/10    DVT (deep venous thrombosis) ([facility] [address] 3/2004    Dyslipidemia 5/2009    Dyspareunia 05/2009    ETOH abuse 3/04/2007    Feet clawing     HTN (hypertension)     Hx of blood clots     Hyperlipidemia     MRSA (methicillin resistant staph aureus) culture positive 11/06/2017; 11/17/2017    foot; leg     Neuropathy 05/2009    polyneuropathy    Pain, back 04/2008    Pain, eye, right 5/14/04    Pancreatitis 5/14/04    Pseudocyst, pancreas 5/14/04    Scalp lesion 08/2007    Tinea pedis     Tobacco abuse 03/2008    Vaginal bleeding, abnormal 6/2007       REVIEW OF SYSTEMS:  See HPI above. OBJECTIVE    VASCULAR: DP and PT pulses are palpable 1/4 b/l. CFT is brisk x8 digits. Skin temperature is warm to warm from proximal to distal, b/l. No increased focal erythema and calor noted to n/l feet    NEUROLOGIC: Gross and protective sensation is diminished b/l. DERMATOLOGIC:     LEFT:   Wound located to the plantar aspect of the 1st MPJ that is full thickness with hyperkeratotic rim and maceration noted. Wound measures approximately 1cm in diameter. No malodor noted. No purulent drainage noted. RIGHT:   S/p 5th ray amputation. Incision is well-coapted with suture.  No dehiscnece or drainage

## 2018-09-24 ENCOUNTER — OFFICE VISIT (OUTPATIENT)
Dept: INTERNAL MEDICINE CLINIC | Age: 53
End: 2018-09-24
Payer: MEDICARE

## 2018-09-24 DIAGNOSIS — S91.302D OPEN WOUND OF LEFT FOOT WITH COMPLICATION, SUBSEQUENT ENCOUNTER: Primary | ICD-10-CM

## 2018-09-24 PROCEDURE — 99213 OFFICE O/P EST LOW 20 MIN: CPT | Performed by: STUDENT IN AN ORGANIZED HEALTH CARE EDUCATION/TRAINING PROGRAM

## 2018-09-24 NOTE — PROGRESS NOTES
OUTPATIENT SPECIALTY PODIATRY VISIT      Nursing Progress Note      September 24, 2018  Shadi Ferris    Patient description of the problem: Wound check bilateral  Observations: Dressings on both feet  Ambulates: non weight bearing    Gait: N/A     Assistive Devices: wheelchair    Fall History: No    Foot Hygiene: good    Foot Wear Proper: Yes    Impaired Skin Integrity: Yes Healing wounds bottom of both feet     Pain Assessment:  Pain Present: no    Education Assessment:    Identify the learner who is being assessed for education:  patient                    Ability to Learn:  Exhibits ability to grasp concepts and respond to questions: Medium  Ready to Learn: Yes  calm   Preferred Method of Learning:  written  Barriers to Learning: Verbalizes interest  Special Considerations due to cultural, Gnosticist, spiritual beliefs:  No  Language:  English  :      Comments:  Has a desire to leave nursing home.   Mookie Ortiz  9:51 AM 9/24/2018

## 2018-09-24 NOTE — PROGRESS NOTES
.  Department of Podiatry  Resident Progress Note    Doni Vaughan  Allergies: Sulfa antibiotics    SUBJECTIVE  The is a 52 y.o. female who presents to clinic today for post-operative visit #2 s/p 5th ray amputation on the right. Patient denies pain. Patient states she is ready to be discharged from [place]. Patient denies any n/v/c/sob/cp/f and no other pedal complaints at this time. Past Medical History:         Diagnosis Date    Amenorrhea     Anomalies of nails     Asthma 5/14/04    Athlete's foot 8/2010    Bacterial vaginosis 04/2008    Carpal tunnel syndrome 5/2007    Diabetes mellitus type II 08/2007    Diabetic neuropathy ([facility] [address] 8/10    DVT (deep venous thrombosis) ([facility] [address] 3/2004    Dyslipidemia 5/2009    Dyspareunia 05/2009    ETOH abuse 3/04/2007    Feet clawing     HTN (hypertension)     Hx of blood clots     Hyperlipidemia     MRSA (methicillin resistant staph aureus) culture positive 11/06/2017; 11/17/2017    foot; leg     Neuropathy 05/2009    polyneuropathy    Pain, back 04/2008    Pain, eye, right 5/14/04    Pancreatitis 5/14/04    Pseudocyst, pancreas 5/14/04    Scalp lesion 08/2007    Tinea pedis     Tobacco abuse 03/2008    Vaginal bleeding, abnormal 6/2007       REVIEW OF SYSTEMS:  See HPI above. OBJECTIVE    VASCULAR: DP and PT pulses are palpable 1/4 b/l. CFT is brisk x8 digits. Skin temperature is warm to warm from proximal to distal, b/l. No increased focal erythema and calor noted to n/l feet    NEUROLOGIC: Gross and protective sensation is diminished b/l. DERMATOLOGIC:     LEFT:   Wound located to the plantar aspect of the 1st MPJ that is full thickness with hyperkeratotic rim. Wound measures approximately 1cm in diameter. No malodor noted. No purulent drainage noted. RIGHT:   S/p 5th ray amputation. Incision is well-coapted with suture. No dehiscnece or drainage noted. Periwound is dry and intact. Diffuse xerosis noted b/l.      MUSCULOSKELETAL: Muscle strength testing deferred. 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 ASSESSMENT  1. Neuropathic ulceration 2/2 DM,  Carpenter grade II- Left foot  2. Neuropathic ulceration 2/2 DM, Carpenter grade II- Right foot. 3. Diabetes mellitus Type II, uncontrolled. 4. Peripheral neuropathy 2/2 Uncontrolled DM. PLAN  -Patient seen and examined. Etiology and treatment options discussed with patient for roughly 30 minutes. Elijah Slocumb excisional debridement of the wound on the left using a 3mm currette down to and including subcutaneous tissue. Bleeding tissue noted. <2cm squared of tissue removed.   - Left wound dressed with tiffany, offloading pad, 4x8 gauze, kerlix, and ace   - Instructed patient ot remain non-weightbearing on right and heel weight bearing on left  - Apply lac hydrin to right daily   - RTC 1 week; depending on appointment, patient may be discharged from [facility] PGY1  Pager [phone]

## 2018-10-05 ENCOUNTER — OFFICE VISIT (OUTPATIENT)
Dept: INTERNAL MEDICINE CLINIC | Age: 53
End: 2018-10-05
Payer: MEDICARE

## 2018-10-05 DIAGNOSIS — M86.171 ACUTE OSTEOMYELITIS OF METATARSAL BONE OF RIGHT FOOT (HCC): Primary | ICD-10-CM

## 2018-10-05 PROCEDURE — 99213 OFFICE O/P EST LOW 20 MIN: CPT | Performed by: STUDENT IN AN ORGANIZED HEALTH CARE EDUCATION/TRAINING PROGRAM

## 2018-10-05 RX ORDER — FUROSEMIDE 40 MG/1
40 TABLET ORAL 2 TIMES DAILY
Qty: 60 TABLET | Refills: 3
Start: 2018-10-05 | End: 2018-10-29 | Stop reason: SDUPTHER

## 2018-10-12 ENCOUNTER — OFFICE VISIT (OUTPATIENT)
Dept: INTERNAL MEDICINE CLINIC | Age: 53
End: 2018-10-12
Payer: MEDICARE

## 2018-10-12 DIAGNOSIS — L97.521 DIABETIC ULCER OF OTHER PART OF LEFT FOOT ASSOCIATED WITH TYPE 2 DIABETES MELLITUS, LIMITED TO BREAKDOWN OF SKIN (HCC): Primary | ICD-10-CM

## 2018-10-12 DIAGNOSIS — E11.621 DIABETIC ULCER OF OTHER PART OF LEFT FOOT ASSOCIATED WITH TYPE 2 DIABETES MELLITUS, LIMITED TO BREAKDOWN OF SKIN (HCC): Primary | ICD-10-CM

## 2018-10-12 PROCEDURE — 99213 OFFICE O/P EST LOW 20 MIN: CPT | Performed by: STUDENT IN AN ORGANIZED HEALTH CARE EDUCATION/TRAINING PROGRAM

## 2018-10-13 PROBLEM — L97.521 DIABETIC ULCER OF LEFT FOOT ASSOCIATED WITH TYPE 2 DIABETES MELLITUS, LIMITED TO BREAKDOWN OF SKIN (HCC): Status: ACTIVE | Noted: 2018-07-17

## 2018-10-13 NOTE — PROGRESS NOTES
future to prevent wound breakdown.  Patient is in agreement with this.  -Patient will return to clinic in 1 week for local wound care    Raf Browning DPM PGY-1  Pager: [phone]

## 2018-10-19 ENCOUNTER — OFFICE VISIT (OUTPATIENT)
Dept: INTERNAL MEDICINE CLINIC | Age: 53
End: 2018-10-19
Payer: MEDICARE

## 2018-10-19 DIAGNOSIS — L97.521 DIABETIC ULCER OF OTHER PART OF LEFT FOOT ASSOCIATED WITH TYPE 2 DIABETES MELLITUS, LIMITED TO BREAKDOWN OF SKIN (HCC): Primary | ICD-10-CM

## 2018-10-19 DIAGNOSIS — E11.621 DIABETIC ULCER OF OTHER PART OF LEFT FOOT ASSOCIATED WITH TYPE 2 DIABETES MELLITUS, LIMITED TO BREAKDOWN OF SKIN (HCC): Primary | ICD-10-CM

## 2018-10-19 PROCEDURE — 99213 OFFICE O/P EST LOW 20 MIN: CPT | Performed by: STUDENT IN AN ORGANIZED HEALTH CARE EDUCATION/TRAINING PROGRAM

## 2018-10-19 NOTE — PATIENT INSTRUCTIONS
Modesta and ace wrap daily   Remain in posts op shoe. Return in 1 week. discharge order given to patient to give to nursing home.

## 2018-10-29 ENCOUNTER — OFFICE VISIT (OUTPATIENT)
Dept: INTERNAL MEDICINE CLINIC | Age: 53
End: 2018-10-29
Payer: MEDICARE

## 2018-10-29 VITALS
SYSTOLIC BLOOD PRESSURE: 166 MMHG | TEMPERATURE: 97.8 F | DIASTOLIC BLOOD PRESSURE: 96 MMHG | OXYGEN SATURATION: 96 % | HEART RATE: 77 BPM | RESPIRATION RATE: 20 BRPM

## 2018-10-29 DIAGNOSIS — I10 ESSENTIAL HYPERTENSION: ICD-10-CM

## 2018-10-29 DIAGNOSIS — E11.42 TYPE 2 DIABETES MELLITUS WITH DIABETIC POLYNEUROPATHY, WITH LONG-TERM CURRENT USE OF INSULIN (HCC): Primary | ICD-10-CM

## 2018-10-29 DIAGNOSIS — L97.521 DIABETIC ULCER OF LEFT FOOT ASSOCIATED WITH TYPE 2 DIABETES MELLITUS, LIMITED TO BREAKDOWN OF SKIN, UNSPECIFIED PART OF FOOT (HCC): Primary | ICD-10-CM

## 2018-10-29 DIAGNOSIS — E11.42 DIABETIC POLYNEUROPATHY ASSOCIATED WITH TYPE 2 DIABETES MELLITUS (HCC): ICD-10-CM

## 2018-10-29 DIAGNOSIS — Z79.4 TYPE 2 DIABETES MELLITUS WITH DIABETIC POLYNEUROPATHY, WITH LONG-TERM CURRENT USE OF INSULIN (HCC): Primary | ICD-10-CM

## 2018-10-29 DIAGNOSIS — E78.5 DYSLIPIDEMIA: ICD-10-CM

## 2018-10-29 DIAGNOSIS — E11.621 DIABETIC ULCER OF LEFT FOOT ASSOCIATED WITH TYPE 2 DIABETES MELLITUS, LIMITED TO BREAKDOWN OF SKIN, UNSPECIFIED PART OF FOOT (HCC): Primary | ICD-10-CM

## 2018-10-29 DIAGNOSIS — I50.30 HEART FAILURE WITH PRESERVED EJECTION FRACTION (HCC): ICD-10-CM

## 2018-10-29 PROCEDURE — 99213 OFFICE O/P EST LOW 20 MIN: CPT | Performed by: STUDENT IN AN ORGANIZED HEALTH CARE EDUCATION/TRAINING PROGRAM

## 2018-10-29 PROCEDURE — 87070 CULTURE OTHR SPECIMN AEROBIC: CPT

## 2018-10-29 PROCEDURE — 87205 SMEAR GRAM STAIN: CPT

## 2018-10-29 PROCEDURE — 87075 CULTR BACTERIA EXCEPT BLOOD: CPT

## 2018-10-29 PROCEDURE — 99213 OFFICE O/P EST LOW 20 MIN: CPT | Performed by: INTERNAL MEDICINE

## 2018-10-29 RX ORDER — OMEPRAZOLE 20 MG/1
20 CAPSULE, DELAYED RELEASE ORAL DAILY
Qty: 30 CAPSULE | Refills: 2 | Status: SHIPPED | OUTPATIENT
Start: 2018-10-29 | End: 2019-02-19 | Stop reason: SDUPTHER

## 2018-10-29 RX ORDER — DOXYCYCLINE 100 MG/1
100 TABLET ORAL 2 TIMES DAILY
Qty: 20 TABLET | Refills: 0 | Status: SHIPPED | OUTPATIENT
Start: 2018-10-29 | End: 2018-11-08

## 2018-10-29 RX ORDER — GABAPENTIN 800 MG/1
800 TABLET ORAL 4 TIMES DAILY
Qty: 120 TABLET | Refills: 2 | Status: SHIPPED | OUTPATIENT
Start: 2018-10-29 | End: 2019-02-11 | Stop reason: SDUPTHER

## 2018-10-29 RX ORDER — CARVEDILOL 6.25 MG/1
6.25 TABLET ORAL 2 TIMES DAILY
Qty: 60 TABLET | Refills: 5 | Status: ON HOLD | OUTPATIENT
Start: 2018-10-29 | End: 2019-02-08 | Stop reason: HOSPADM

## 2018-10-29 RX ORDER — ATORVASTATIN CALCIUM 20 MG/1
20 TABLET, FILM COATED ORAL DAILY
Qty: 30 TABLET | Refills: 2 | Status: SHIPPED | OUTPATIENT
Start: 2018-10-29 | End: 2019-02-19 | Stop reason: SDUPTHER

## 2018-10-29 RX ORDER — FUROSEMIDE 40 MG/1
40 TABLET ORAL 2 TIMES DAILY
Qty: 60 TABLET | Refills: 3 | Status: SHIPPED | OUTPATIENT
Start: 2018-10-29 | End: 2019-02-11 | Stop reason: SDUPTHER

## 2018-10-29 RX ORDER — AMITRIPTYLINE HYDROCHLORIDE 100 MG/1
100 TABLET, FILM COATED ORAL NIGHTLY
Qty: 30 TABLET | Refills: 2 | Status: SHIPPED | OUTPATIENT
Start: 2018-10-29 | End: 2019-02-11 | Stop reason: SDUPTHER

## 2018-10-29 NOTE — PROGRESS NOTES
dispensed  -Written instructions for daily wound care dressing changes for patient to perform dispensed, as above. Patient is in agreement with this. - Instructed patient to remain heel weightbearing on bilateral feet in post-op shoe. Patient is in agreement with this.  -Plan for custom diabetic shoes and inserts in the future to prevent wound breakdown.  Patient is in agreement with this.  -Patient will return to clinic in 1 week for local wound care    Mao Ocampo DPM PGY-1  Pager: [phone]

## 2018-11-09 ENCOUNTER — OFFICE VISIT (OUTPATIENT)
Dept: INTERNAL MEDICINE CLINIC | Age: 53
End: 2018-11-09
Payer: MEDICARE

## 2018-11-09 DIAGNOSIS — L97.521 DIABETIC ULCER OF TOE OF LEFT FOOT ASSOCIATED WITH TYPE 2 DIABETES MELLITUS, LIMITED TO BREAKDOWN OF SKIN (HCC): Primary | ICD-10-CM

## 2018-11-09 DIAGNOSIS — E11.621 DIABETIC ULCER OF TOE OF LEFT FOOT ASSOCIATED WITH TYPE 2 DIABETES MELLITUS, LIMITED TO BREAKDOWN OF SKIN (HCC): Primary | ICD-10-CM

## 2018-11-09 PROCEDURE — 99213 OFFICE O/P EST LOW 20 MIN: CPT | Performed by: STUDENT IN AN ORGANIZED HEALTH CARE EDUCATION/TRAINING PROGRAM

## 2018-11-09 NOTE — PROGRESS NOTES
OUTPATIENT SPECIALTY PODIATRY VISIT      Nursing Progress Note      November 9, 2018  Wiliam Nunez    Patient description of the problem: ulcer right bottom foot pink , slight serous drainage. Superficial. . Right foot with maceration between toes . Between 1st and 2 second. Small opening also noted. Ulcer on bottom left fot dry. Modesta in place. Denies waling .      Observations: as aboved'    Ambulates: without assistance    Gait: Normal     Assistive Devices: walker    Fall History: No    Foot Hygiene: good    Foot Wear Proper: Yes    Impaired Skin Integrity: Yes      Pain Assessment:  Pain Present: no  Pain Score: 0/10  Pain Quality/Description:   Pain Onset:   ago  Pain Goal of patient: /10    Education Assessment:    Identify the learner who is being assessed for education:  patient                    Ability to Learn:  Exhibits ability to grasp concepts and respond to questions: Medium  Ready to Learn: No  calm   Preferred Method of Learning:  written  Barriers to Learning: Verbalizes interest  Special Considerations due to cultural, Taoism, spiritual beliefs:  No  Language:  English  :  No    Eliel Wynne Page  2:55 PM 11/9/2018
08/2007    Tinea pedis     Tobacco abuse 03/2008    Vaginal bleeding, abnormal 6/2007       REVIEW OF SYSTEMS:  See HPI above. OBJECTIVE    VASCULAR: DP and PT pulses are palpable 1/4 b/l. CFT is brisk to the digits of the foot b/l. Skin temperature is warm to warm from proximal to distal with no focal calor noted. No edema noted. No pain with calf compression b/l. NEUROLOGIC: Gross and epicritic sensation is diminished b/l. Protective sensation is diminished at all pedal sites b/l. DERMATOLOGIC: Wound noted to plantar aspect of the 1st metatarsal head that is partial thickness with hyperkeratotic rim. Wound measures 0.8 cm x 0.8 cm x 0.1 cm. Periwound hyperkeratotic tissue noted. Wound does not probe, tract or undermine. Superficial wound noted to the plantar aspect of 4th metatarsal head, Right foot. Wound measures 1.2 x 1.0 x 0.1 cm. Wound bed is granular. No probing, tracking, or undermining. Diffuse xerosis noted b/l. Interdigital maceration noted to the 1st interspace, Right foot      MUSCULOSKELETAL: Muscle strength is 5/5 for all pedal groups tested. No pain with palpation of the foot or ankle b/l. Ankle joint ROM is decreased in dorsiflexion with the knee extended. 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 ASSESSMENT  1. 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 Superficial ulceration sub 4th metatarsal head, Right foot 2/2 friction (Carpentre grade I)  3. Interdigital maceration, 1st interspace, Right foot  4. DM Type II uncontrolled with peripheral neuropathy    PLAN  -Patient seen and examined. Etiology and treatment options discussed with patient for roughly 15 minutes. -Excisional debridement of the wound on the Left foot using a #15 blade down to and including dermal layer. Healthy bleeding noted and stopped via compression. <25 cm squared of tissue removed.   -Excisional debridement of blister on Right foot using tissue nipper down to and

## 2018-11-30 ENCOUNTER — OFFICE VISIT (OUTPATIENT)
Dept: INTERNAL MEDICINE CLINIC | Age: 53
End: 2018-11-30
Payer: MEDICARE

## 2018-11-30 DIAGNOSIS — L97.526 DIABETIC ULCER OF LEFT FOOT ASSOCIATED WITH TYPE 2 DIABETES MELLITUS, WITH BONE INVOLVEMENT WITHOUT EVIDENCE OF NECROSIS, UNSPECIFIED PART OF FOOT (HCC): ICD-10-CM

## 2018-11-30 DIAGNOSIS — E11.621 DIABETIC ULCER OF TOE OF RIGHT FOOT ASSOCIATED WITH TYPE 2 DIABETES MELLITUS, LIMITED TO BREAKDOWN OF SKIN (HCC): Primary | ICD-10-CM

## 2018-11-30 DIAGNOSIS — L97.511 DIABETIC ULCER OF TOE OF RIGHT FOOT ASSOCIATED WITH TYPE 2 DIABETES MELLITUS, LIMITED TO BREAKDOWN OF SKIN (HCC): Primary | ICD-10-CM

## 2018-11-30 DIAGNOSIS — E11.621 DIABETIC ULCER OF LEFT FOOT ASSOCIATED WITH TYPE 2 DIABETES MELLITUS, WITH BONE INVOLVEMENT WITHOUT EVIDENCE OF NECROSIS, UNSPECIFIED PART OF FOOT (HCC): ICD-10-CM

## 2018-11-30 PROCEDURE — 99213 OFFICE O/P EST LOW 20 MIN: CPT | Performed by: STUDENT IN AN ORGANIZED HEALTH CARE EDUCATION/TRAINING PROGRAM

## 2018-12-07 ENCOUNTER — OFFICE VISIT (OUTPATIENT)
Dept: INTERNAL MEDICINE CLINIC | Age: 53
End: 2018-12-07
Payer: MEDICARE

## 2018-12-07 DIAGNOSIS — E11.621 DIABETIC ULCER OF OTHER PART OF RIGHT FOOT ASSOCIATED WITH TYPE 2 DIABETES MELLITUS, LIMITED TO BREAKDOWN OF SKIN (HCC): Primary | ICD-10-CM

## 2018-12-07 DIAGNOSIS — L97.511 DIABETIC ULCER OF OTHER PART OF RIGHT FOOT ASSOCIATED WITH TYPE 2 DIABETES MELLITUS, LIMITED TO BREAKDOWN OF SKIN (HCC): Primary | ICD-10-CM

## 2018-12-07 DIAGNOSIS — L97.526 DIABETIC ULCER OF TOE OF LEFT FOOT ASSOCIATED WITH TYPE 2 DIABETES MELLITUS, WITH BONE INVOLVEMENT WITHOUT EVIDENCE OF NECROSIS (HCC): ICD-10-CM

## 2018-12-07 DIAGNOSIS — E11.621 DIABETIC ULCER OF TOE OF LEFT FOOT ASSOCIATED WITH TYPE 2 DIABETES MELLITUS, WITH BONE INVOLVEMENT WITHOUT EVIDENCE OF NECROSIS (HCC): ICD-10-CM

## 2018-12-07 PROCEDURE — 99213 OFFICE O/P EST LOW 20 MIN: CPT | Performed by: STUDENT IN AN ORGANIZED HEALTH CARE EDUCATION/TRAINING PROGRAM

## 2018-12-07 NOTE — PROGRESS NOTES
noted. No pain with calf compression b/l. NEUROLOGIC: Gross and epicritic sensation is diminished b/l. Protective sensation is diminished at all pedal sites b/l. DERMATOLOGIC: Wound noted to plantar aspect of the 1st metatarsal head that is full thickness in nature with concern for joint capsule/bone exposed with hyperkeratotic rim. Wound measures 2.0 x 2.0 x 0.6 cm. Periwound hyperkeratotic tissue noted. Wound probes to bone. No  tracking or undermining noted. Serious drainage expressed. No purulent drainage or malodor noted. Superficial wound noted to the plantar aspect of 4th metatarsal head, Right foot. Wound measures 3.0 x 2.0 x 0.2 cm. Wound bed is granular with macerated tissue to periphery. Undermining noted along the wound periphery. No tracking or probe to bone noted. No purulent drainage or malodor noted. Diffuse xerosis noted b/l. MUSCULOSKELETAL: Muscle strength is 5/5 for all pedal groups tested. No pain with palpation of the foot or ankle b/l. Ankle joint ROM is decreased in dorsiflexion with the knee extended. Hallux amputation, Left foot. 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 ASSESSMENT  1. Neuropathic ulceration 2/2 DM, sub 1st metatarsal head, Left foot (Carpenter grade III)  2. Superficial ulceration sub 4th metatarsal head, Right foot 2/2 friction (Carpenter grade I)  3. Interdigital maceration 1st, 2nd and 3rd interspaces, Right foot  4. DM Type II uncontrolled with peripheral neuropathy    PLAN  -Evaluation and management x 20 minutes and greater than 50% of the time spent explaining the etiology and treatment with the patient.   -Patient educated on importance of obtaining x-ray of Left foot that were dispensed at last visit to rule out any underlying OM at this point and prevent any further amputation. Patient is in understanding of this and states she will obtained X-rays of Left foot.   -Excisional debridement of the wound on the Left foot using a 3 mm currette down to and including capsular/bone layer. Healthy bleeding noted and stopped via compression. <25 cm squared of tissue removed. -Excisional debridement of superficial wound on Right foot using tissue nipper and 3 mm currette down to and including epithelial layer. Bleeding noted and stopped via compression. <25 cm2 of tissue debrided.  -Wound on the left foot dressed with tiffany, 4x8 gauze, kerlix, and Ace bandage  -Wound on the right foot dressed with TIFFANY, betadine to wound periphery, 4x8 gauze, kerlix and Ace bandage  -Patient instructed to clean web spaces daily, bilateral feet and apply betadine to 1st, 2nd and 3rd interspaces Right foot due to maceration noted between the digits.  -Patient to continue with written instructions for daily wound care dressing changes as above. Patient is in agreement with this. - Instructed patient to transition into her CAM boot on the Left foot to aide in providing pressure relief to the wound on the plantar aspect of the 1st metatarsal, Left foot and to remain heel weightbearing on the Right foot in post-op shoe. Patient is in agreement with this.  -Plan for custom diabetic shoes and inserts in the future to prevent wound breakdown.  Patient is in agreement with this.  -Patient will return to clinic in 1 week for local wound care    Silva Khanna DPM PGY-1  Pager: [phone]

## 2018-12-21 ENCOUNTER — HOSPITAL ENCOUNTER (OUTPATIENT)
Dept: GENERAL RADIOLOGY | Age: 53
Discharge: HOME | End: 2018-12-21
Payer: MEDICARE

## 2018-12-21 ENCOUNTER — OFFICE VISIT (OUTPATIENT)
Dept: INTERNAL MEDICINE CLINIC | Age: 53
End: 2018-12-21
Payer: MEDICARE

## 2018-12-21 ENCOUNTER — HOSPITAL ENCOUNTER (OUTPATIENT)
Age: 53
Discharge: HOME | End: 2018-12-21
Payer: MEDICARE

## 2018-12-21 DIAGNOSIS — S91.301D OPEN WOUND OF RIGHT FOOT, SUBSEQUENT ENCOUNTER: ICD-10-CM

## 2018-12-21 DIAGNOSIS — S91.302D OPEN WOUND OF LEFT FOOT, SUBSEQUENT ENCOUNTER: ICD-10-CM

## 2018-12-21 DIAGNOSIS — E11.621 DIABETIC ULCER OF OTHER PART OF RIGHT FOOT ASSOCIATED WITH TYPE 2 DIABETES MELLITUS, LIMITED TO BREAKDOWN OF SKIN (HCC): Primary | ICD-10-CM

## 2018-12-21 DIAGNOSIS — L97.511 DIABETIC ULCER OF OTHER PART OF RIGHT FOOT ASSOCIATED WITH TYPE 2 DIABETES MELLITUS, LIMITED TO BREAKDOWN OF SKIN (HCC): Primary | ICD-10-CM

## 2018-12-21 DIAGNOSIS — E11.621 DIABETIC ULCER OF LEFT FOOT ASSOCIATED WITH TYPE 2 DIABETES MELLITUS, WITH BONE INVOLVEMENT WITHOUT EVIDENCE OF NECROSIS, UNSPECIFIED PART OF FOOT (HCC): ICD-10-CM

## 2018-12-21 DIAGNOSIS — L97.526 DIABETIC ULCER OF LEFT FOOT ASSOCIATED WITH TYPE 2 DIABETES MELLITUS, WITH BONE INVOLVEMENT WITHOUT EVIDENCE OF NECROSIS, UNSPECIFIED PART OF FOOT (HCC): ICD-10-CM

## 2018-12-21 PROCEDURE — 99213 OFFICE O/P EST LOW 20 MIN: CPT | Performed by: STUDENT IN AN ORGANIZED HEALTH CARE EDUCATION/TRAINING PROGRAM

## 2018-12-21 PROCEDURE — 73630 X-RAY EXAM OF FOOT: CPT

## 2018-12-21 RX ORDER — DOXYCYCLINE 100 MG/1
100 TABLET ORAL 2 TIMES DAILY
Qty: 20 TABLET | Refills: 0 | Status: SHIPPED | OUTPATIENT
Start: 2018-12-21 | End: 2018-12-31

## 2018-12-21 NOTE — PATIENT INSTRUCTIONS
Doxycycline one twice a day. Modesta dressing changes daily   Return in 1 week  Stay off feet. Patient Education          doxycycline  Pronunciation:  DOX car garcia  Brand:  Acticlate, Adoxa, Alodox, Avidoxy, Doryx, Mondoxyne NL, Monodox, Morgidox, Oracea, Oraxyl, Targadox, Vibramycin  What is the most important information I should know about doxycycline? You should not take this medicine if you are allergic to any tetracycline antibiotic. Children younger than 6years old should use doxycycline only in cases of severe or life-threatening conditions. This medicine can cause permanent yellowing or graying of the teeth in children  Using doxycycline during pregnancy could harm the unborn baby or cause permanent tooth discoloration later in the baby's life. What is doxycycline? Doxycycline is a tetracycline antibiotic that fights bacteria in the body. Doxycycline is used to treat many different bacterial infections, such as acne, urinary tract infections, intestinal infections, eye infections, gonorrhea, chlamydia, periodontitis (gum disease), and others. Doxycycline is also used to treat blemishes, bumps, and acne-like lesions caused by rosacea. Doxycycline will not treat facial redness caused by rosacea. Some forms of doxycycline are used to prevent malaria, to treat anthrax, or to treat infections caused by mites, ticks, or lice. Doxycycline may also be used for purposes not listed in this medication guide. What should I discuss with my healthcare provider before taking doxycycline? You should not take this medicine if you are allergic to doxycycline or other tetracycline antibiotics such as demeclocycline, minocycline, tetracycline, or tigecycline. Tell your doctor if you have ever had:  · liver disease;  · kidney disease;  · asthma or sulfite allergy;  · increased pressure inside your skull; or  · if you also take isotretinoin, seizure medicine, or a blood thinner such as warfarin (Coumadin).   If doxycycline? Get emergency medical help if you have signs of an allergic reaction (hives, difficult breathing, swelling in your face or throat) or a severe skin reaction (fever, sore throat, burning in your eyes, skin pain, red or purple skin rash that spreads and causes blistering and peeling). Seek medical treatment if you have a serious drug reaction that can affect many parts of your body. Symptoms may include: skin rash, fever, swollen glands, flu-like symptoms, muscle aches, severe weakness, unusual bruising, or yellowing of your skin or eyes. This reaction may occur several weeks after you began using doxycycline. Call your doctor at once if you have:  · severe stomach pain, diarrhea that is watery or bloody;  · throat irritation, trouble swallowing;  · chest pain, irregular heart rhythm, feeling short of breath;  · little or no urination;  · low white blood cell counts --fever, swollen glands, body aches, weakness, pale skin, easy bruising or bleeding;  · increased pressure inside the skull --severe headaches, ringing in your ears, dizziness, nausea, vision problems, pain behind your eyes; or  · signs of liver or pancreas problems --loss of appetite, upper stomach pain (that may spread to your back), tiredness, nausea or vomiting, fast heart rate, dark urine, jaundice (yellowing of the skin or eyes). Common side effects may include:  · nausea, vomiting, upset stomach, loss of appetite;  · mild diarrhea;  · skin rash or itching; or  · vaginal itching or discharge. This is not a complete list of side effects and others may occur. Call your doctor for medical advice about side effects. You may report side effects to FDA at 0-842-FDA-0827. What other drugs will affect doxycycline? Sometimes it is not safe to use certain medications at the same time. Some drugs can affect your blood levels of other drugs you take, which may increase side effects or make the medications less effective.   Other drugs may

## 2019-01-04 ENCOUNTER — OFFICE VISIT (OUTPATIENT)
Dept: INTERNAL MEDICINE CLINIC | Age: 54
DRG: 982 | End: 2019-01-04
Payer: MEDICARE

## 2019-01-04 ENCOUNTER — HOSPITAL ENCOUNTER (INPATIENT)
Age: 54
LOS: 3 days | Discharge: HOME | DRG: 982 | End: 2019-01-07
Attending: PODIATRIST | Admitting: PODIATRIST
Payer: MEDICARE

## 2019-01-04 ENCOUNTER — APPOINTMENT (OUTPATIENT)
Dept: GENERAL RADIOLOGY | Age: 54
DRG: 982 | End: 2019-01-04
Attending: PODIATRIST
Payer: MEDICARE

## 2019-01-04 DIAGNOSIS — M79.604 LEG PAIN, POSTERIOR, RIGHT: Primary | ICD-10-CM

## 2019-01-04 DIAGNOSIS — S91.302D OPEN WOUND OF LEFT FOOT, SUBSEQUENT ENCOUNTER: ICD-10-CM

## 2019-01-04 DIAGNOSIS — L03.115 CELLULITIS OF RIGHT LOWER EXTREMITY: ICD-10-CM

## 2019-01-04 LAB
A/G RATIO: 1 (ref 1.1–2.2)
ALBUMIN SERPL-MCNC: 3.4 G/DL (ref 3.4–5)
ALP BLD-CCNC: 211 U/L (ref 40–129)
ALT SERPL-CCNC: 23 U/L (ref 10–40)
ANION GAP SERPL CALCULATED.3IONS-SCNC: 15 MMOL/L (ref 3–16)
ANISOCYTOSIS: ABNORMAL
AST SERPL-CCNC: 26 U/L (ref 15–37)
BASOPHILS ABSOLUTE: 0 K/UL (ref 0–0.2)
BASOPHILS RELATIVE PERCENT: 0 %
BILIRUB SERPL-MCNC: <0.2 MG/DL (ref 0–1)
BUN BLDV-MCNC: 29 MG/DL (ref 7–20)
C-REACTIVE PROTEIN: 43.8 MG/L (ref 0–5.1)
CALCIUM SERPL-MCNC: 9.3 MG/DL (ref 8.3–10.6)
CHLORIDE BLD-SCNC: 100 MMOL/L (ref 99–110)
CO2: 29 MMOL/L (ref 21–32)
CREAT SERPL-MCNC: 1.4 MG/DL (ref 0.6–1.1)
EOSINOPHILS ABSOLUTE: 0.3 K/UL (ref 0–0.6)
EOSINOPHILS RELATIVE PERCENT: 3 %
GFR AFRICAN AMERICAN: 47
GFR NON-AFRICAN AMERICAN: 39
GLOBULIN: 3.4 G/DL
GLUCOSE BLD-MCNC: 237 MG/DL (ref 70–99)
GLUCOSE BLD-MCNC: 65 MG/DL (ref 70–99)
HCT VFR BLD CALC: 34.6 % (ref 36–48)
HEMOGLOBIN: 11 G/DL (ref 12–16)
LYMPHOCYTES ABSOLUTE: 2.6 K/UL (ref 1–5.1)
LYMPHOCYTES RELATIVE PERCENT: 23 %
MCH RBC QN AUTO: 29.6 PG (ref 26–34)
MCHC RBC AUTO-ENTMCNC: 31.7 G/DL (ref 31–36)
MCV RBC AUTO: 93.3 FL (ref 80–100)
MONOCYTES ABSOLUTE: 0.4 K/UL (ref 0–1.3)
MONOCYTES RELATIVE PERCENT: 4 %
NEUTROPHILS ABSOLUTE: 7.8 K/UL (ref 1.7–7.7)
NEUTROPHILS RELATIVE PERCENT: 70 %
PDW BLD-RTO: 16.8 % (ref 12.4–15.4)
PERFORMED ON: ABNORMAL
PLATELET # BLD: 327 K/UL (ref 135–450)
PMV BLD AUTO: 8.9 FL (ref 5–10.5)
POTASSIUM SERPL-SCNC: 4 MMOL/L (ref 3.5–5.1)
RBC # BLD: 3.71 M/UL (ref 4–5.2)
SEDIMENTATION RATE, ERYTHROCYTE: 105 MM/HR (ref 0–30)
SODIUM BLD-SCNC: 144 MMOL/L (ref 136–145)
TOTAL PROTEIN: 6.8 G/DL (ref 6.4–8.2)
WBC # BLD: 11.2 K/UL (ref 4–11)

## 2019-01-04 PROCEDURE — 86880 COOMBS TEST DIRECT: CPT

## 2019-01-04 PROCEDURE — 94664 DEMO&/EVAL PT USE INHALER: CPT

## 2019-01-04 PROCEDURE — 87070 CULTURE OTHR SPECIMN AEROBIC: CPT

## 2019-01-04 PROCEDURE — 87077 CULTURE AEROBIC IDENTIFY: CPT

## 2019-01-04 PROCEDURE — 2580000003 HC RX 258: Performed by: STUDENT IN AN ORGANIZED HEALTH CARE EDUCATION/TRAINING PROGRAM

## 2019-01-04 PROCEDURE — 86870 RBC ANTIBODY IDENTIFICATION: CPT

## 2019-01-04 PROCEDURE — 83036 HEMOGLOBIN GLYCOSYLATED A1C: CPT

## 2019-01-04 PROCEDURE — 80053 COMPREHEN METABOLIC PANEL: CPT

## 2019-01-04 PROCEDURE — 6370000000 HC RX 637 (ALT 250 FOR IP): Performed by: STUDENT IN AN ORGANIZED HEALTH CARE EDUCATION/TRAINING PROGRAM

## 2019-01-04 PROCEDURE — 94760 N-INVAS EAR/PLS OXIMETRY 1: CPT

## 2019-01-04 PROCEDURE — 85652 RBC SED RATE AUTOMATED: CPT

## 2019-01-04 PROCEDURE — 93971 EXTREMITY STUDY: CPT

## 2019-01-04 PROCEDURE — 99213 OFFICE O/P EST LOW 20 MIN: CPT | Performed by: STUDENT IN AN ORGANIZED HEALTH CARE EDUCATION/TRAINING PROGRAM

## 2019-01-04 PROCEDURE — 86140 C-REACTIVE PROTEIN: CPT

## 2019-01-04 PROCEDURE — 87186 SC STD MICRODIL/AGAR DIL: CPT

## 2019-01-04 PROCEDURE — 85025 COMPLETE CBC W/AUTO DIFF WBC: CPT

## 2019-01-04 PROCEDURE — 87205 SMEAR GRAM STAIN: CPT

## 2019-01-04 PROCEDURE — 93005 ELECTROCARDIOGRAM TRACING: CPT | Performed by: STUDENT IN AN ORGANIZED HEALTH CARE EDUCATION/TRAINING PROGRAM

## 2019-01-04 PROCEDURE — 73630 X-RAY EXAM OF FOOT: CPT

## 2019-01-04 PROCEDURE — 94150 VITAL CAPACITY TEST: CPT

## 2019-01-04 PROCEDURE — 86900 BLOOD TYPING SEROLOGIC ABO: CPT

## 2019-01-04 PROCEDURE — 86850 RBC ANTIBODY SCREEN: CPT

## 2019-01-04 PROCEDURE — 6360000002 HC RX W HCPCS: Performed by: STUDENT IN AN ORGANIZED HEALTH CARE EDUCATION/TRAINING PROGRAM

## 2019-01-04 PROCEDURE — 86901 BLOOD TYPING SEROLOGIC RH(D): CPT

## 2019-01-04 PROCEDURE — 36415 COLL VENOUS BLD VENIPUNCTURE: CPT

## 2019-01-04 PROCEDURE — 99223 1ST HOSP IP/OBS HIGH 75: CPT | Performed by: INTERNAL MEDICINE

## 2019-01-04 PROCEDURE — 86922 COMPATIBILITY TEST ANTIGLOB: CPT

## 2019-01-04 PROCEDURE — 1200000000 HC SEMI PRIVATE

## 2019-01-04 RX ORDER — DEXTROSE MONOHYDRATE 25 G/50ML
12.5 INJECTION, SOLUTION INTRAVENOUS PRN
Status: DISCONTINUED | OUTPATIENT
Start: 2019-01-04 | End: 2019-01-07 | Stop reason: HOSPADM

## 2019-01-04 RX ORDER — HYDROCODONE BITARTRATE AND ACETAMINOPHEN 5; 325 MG/1; MG/1
1 TABLET ORAL EVERY 6 HOURS PRN
Status: DISCONTINUED | OUTPATIENT
Start: 2019-01-04 | End: 2019-01-07 | Stop reason: HOSPADM

## 2019-01-04 RX ORDER — NALOXONE HYDROCHLORIDE 0.4 MG/ML
0.4 INJECTION, SOLUTION INTRAMUSCULAR; INTRAVENOUS; SUBCUTANEOUS PRN
Status: DISCONTINUED | OUTPATIENT
Start: 2019-01-04 | End: 2019-01-07 | Stop reason: HOSPADM

## 2019-01-04 RX ORDER — NICOTINE 21 MG/24HR
1 PATCH, TRANSDERMAL 24 HOURS TRANSDERMAL EVERY 24 HOURS
Status: DISCONTINUED | OUTPATIENT
Start: 2019-01-05 | End: 2019-01-07 | Stop reason: HOSPADM

## 2019-01-04 RX ORDER — ATORVASTATIN CALCIUM 20 MG/1
20 TABLET, FILM COATED ORAL DAILY
Status: DISCONTINUED | OUTPATIENT
Start: 2019-01-05 | End: 2019-01-07 | Stop reason: HOSPADM

## 2019-01-04 RX ORDER — FUROSEMIDE 40 MG/1
40 TABLET ORAL DAILY
Status: DISCONTINUED | OUTPATIENT
Start: 2019-01-05 | End: 2019-01-05

## 2019-01-04 RX ORDER — PROMETHAZINE HYDROCHLORIDE 12.5 MG/1
12.5 TABLET ORAL EVERY 6 HOURS PRN
Status: DISCONTINUED | OUTPATIENT
Start: 2019-01-04 | End: 2019-01-07 | Stop reason: HOSPADM

## 2019-01-04 RX ORDER — AMITRIPTYLINE HYDROCHLORIDE 50 MG/1
100 TABLET, FILM COATED ORAL NIGHTLY
Status: DISCONTINUED | OUTPATIENT
Start: 2019-01-04 | End: 2019-01-07 | Stop reason: HOSPADM

## 2019-01-04 RX ORDER — NICOTINE POLACRILEX 4 MG
15 LOZENGE BUCCAL PRN
Status: DISCONTINUED | OUTPATIENT
Start: 2019-01-04 | End: 2019-01-07 | Stop reason: HOSPADM

## 2019-01-04 RX ORDER — SODIUM CHLORIDE 9 MG/ML
INJECTION, SOLUTION INTRAVENOUS CONTINUOUS
Status: DISCONTINUED | OUTPATIENT
Start: 2019-01-04 | End: 2019-01-07 | Stop reason: HOSPADM

## 2019-01-04 RX ORDER — METHADONE HYDROCHLORIDE 5 MG/5ML
140 SOLUTION ORAL DAILY
Status: DISCONTINUED | OUTPATIENT
Start: 2019-01-05 | End: 2019-01-07

## 2019-01-04 RX ORDER — ASPIRIN 325 MG
325 TABLET ORAL DAILY
Status: DISCONTINUED | OUTPATIENT
Start: 2019-01-05 | End: 2019-01-07 | Stop reason: HOSPADM

## 2019-01-04 RX ORDER — ACETAMINOPHEN 500 MG
500 TABLET ORAL EVERY 6 HOURS PRN
Status: DISCONTINUED | OUTPATIENT
Start: 2019-01-04 | End: 2019-01-07 | Stop reason: HOSPADM

## 2019-01-04 RX ORDER — GABAPENTIN 400 MG/1
800 CAPSULE ORAL 4 TIMES DAILY
Status: DISCONTINUED | OUTPATIENT
Start: 2019-01-05 | End: 2019-01-07 | Stop reason: HOSPADM

## 2019-01-04 RX ORDER — ONDANSETRON 2 MG/ML
4 INJECTION INTRAMUSCULAR; INTRAVENOUS EVERY 6 HOURS PRN
Status: DISCONTINUED | OUTPATIENT
Start: 2019-01-04 | End: 2019-01-07 | Stop reason: HOSPADM

## 2019-01-04 RX ORDER — DIPHENHYDRAMINE HYDROCHLORIDE 50 MG/ML
25 INJECTION INTRAMUSCULAR; INTRAVENOUS EVERY 6 HOURS PRN
Status: DISCONTINUED | OUTPATIENT
Start: 2019-01-04 | End: 2019-01-07 | Stop reason: HOSPADM

## 2019-01-04 RX ORDER — CARVEDILOL 6.25 MG/1
6.25 TABLET ORAL 2 TIMES DAILY
Status: DISCONTINUED | OUTPATIENT
Start: 2019-01-05 | End: 2019-01-07 | Stop reason: HOSPADM

## 2019-01-04 RX ORDER — PANTOPRAZOLE SODIUM 40 MG/1
40 TABLET, DELAYED RELEASE ORAL
Status: DISCONTINUED | OUTPATIENT
Start: 2019-01-05 | End: 2019-01-07 | Stop reason: HOSPADM

## 2019-01-04 RX ORDER — DEXTROSE MONOHYDRATE 50 MG/ML
100 INJECTION, SOLUTION INTRAVENOUS PRN
Status: DISCONTINUED | OUTPATIENT
Start: 2019-01-04 | End: 2019-01-07 | Stop reason: HOSPADM

## 2019-01-04 RX ORDER — FUROSEMIDE 40 MG/1
40 TABLET ORAL 2 TIMES DAILY
Status: DISCONTINUED | OUTPATIENT
Start: 2019-01-05 | End: 2019-01-07 | Stop reason: HOSPADM

## 2019-01-04 RX ORDER — DOCUSATE SODIUM 100 MG/1
100 CAPSULE, LIQUID FILLED ORAL 2 TIMES DAILY PRN
Status: DISCONTINUED | OUTPATIENT
Start: 2019-01-04 | End: 2019-01-07 | Stop reason: HOSPADM

## 2019-01-04 RX ADMIN — INSULIN HUMAN 6 UNITS: 100 INJECTION, SOLUTION PARENTERAL at 23:04

## 2019-01-04 RX ADMIN — ENOXAPARIN SODIUM 40 MG: 40 INJECTION SUBCUTANEOUS at 23:03

## 2019-01-04 RX ADMIN — SODIUM CHLORIDE: 9 INJECTION, SOLUTION INTRAVENOUS at 20:23

## 2019-01-04 RX ADMIN — AMITRIPTYLINE HYDROCHLORIDE 100 MG: 50 TABLET, FILM COATED ORAL at 23:57

## 2019-01-04 RX ADMIN — PIPERACILLIN SODIUM,TAZOBACTAM SODIUM 3.38 G: 3; .375 INJECTION, POWDER, FOR SOLUTION INTRAVENOUS at 22:57

## 2019-01-04 RX ADMIN — VANCOMYCIN HYDROCHLORIDE 1500 MG: 10 INJECTION, POWDER, LYOPHILIZED, FOR SOLUTION INTRAVENOUS at 20:23

## 2019-01-04 RX ADMIN — CARVEDILOL 6.25 MG: 6.25 TABLET, FILM COATED ORAL at 23:57

## 2019-01-04 RX ADMIN — HYDROCODONE BITARTRATE AND ACETAMINOPHEN 1 TABLET: 5; 325 TABLET ORAL at 23:04

## 2019-01-04 ASSESSMENT — PAIN DESCRIPTION - DESCRIPTORS: DESCRIPTORS: ACHING

## 2019-01-04 ASSESSMENT — PAIN - FUNCTIONAL ASSESSMENT: PAIN_FUNCTIONAL_ASSESSMENT: 0-10

## 2019-01-04 ASSESSMENT — PAIN SCALES - GENERAL: PAINLEVEL_OUTOF10: 6

## 2019-01-05 LAB
ABO/RH: NORMAL
ANTIBODY IDENTIFICATION: NORMAL
ANTIBODY SCREEN: NORMAL
BASOPHILS ABSOLUTE: 0.1 K/UL (ref 0–0.2)
BASOPHILS RELATIVE PERCENT: 0.6 %
DAT IGG CAPTURE: NORMAL
EKG ATRIAL RATE: 101 BPM
EKG DIAGNOSIS: NORMAL
EKG P AXIS: 59 DEGREES
EKG P-R INTERVAL: 166 MS
EKG Q-T INTERVAL: 360 MS
EKG QRS DURATION: 90 MS
EKG QTC CALCULATION (BAZETT): 466 MS
EKG R AXIS: 26 DEGREES
EKG T AXIS: 46 DEGREES
EKG VENTRICULAR RATE: 101 BPM
EOSINOPHILS ABSOLUTE: 0.2 K/UL (ref 0–0.6)
EOSINOPHILS RELATIVE PERCENT: 2.4 %
ESTIMATED AVERAGE GLUCOSE: 274.7 MG/DL
GLUCOSE BLD-MCNC: 290 MG/DL (ref 70–99)
GLUCOSE BLD-MCNC: 304 MG/DL (ref 70–99)
GLUCOSE BLD-MCNC: 313 MG/DL (ref 70–99)
GLUCOSE BLD-MCNC: 326 MG/DL (ref 70–99)
GLUCOSE BLD-MCNC: 339 MG/DL (ref 70–99)
HBA1C MFR BLD: 11.2 %
HCT VFR BLD CALC: 29.8 % (ref 36–48)
HEMOGLOBIN: 9.8 G/DL (ref 12–16)
INR BLD: 1 (ref 0.86–1.14)
LYMPHOCYTES ABSOLUTE: 2.4 K/UL (ref 1–5.1)
LYMPHOCYTES RELATIVE PERCENT: 28.7 %
MCH RBC QN AUTO: 30.6 PG (ref 26–34)
MCHC RBC AUTO-ENTMCNC: 32.9 G/DL (ref 31–36)
MCV RBC AUTO: 92.8 FL (ref 80–100)
MONOCYTES ABSOLUTE: 0.5 K/UL (ref 0–1.3)
MONOCYTES RELATIVE PERCENT: 5.4 %
NEUTROPHILS ABSOLUTE: 5.3 K/UL (ref 1.7–7.7)
NEUTROPHILS RELATIVE PERCENT: 62.9 %
PDW BLD-RTO: 16.7 % (ref 12.4–15.4)
PERFORMED ON: ABNORMAL
PLATELET # BLD: 266 K/UL (ref 135–450)
PMV BLD AUTO: 9 FL (ref 5–10.5)
PROTHROMBIN TIME: 11.4 SEC (ref 9.8–13)
RBC # BLD: 3.21 M/UL (ref 4–5.2)
SEDIMENTATION RATE, ERYTHROCYTE: 87 MM/HR (ref 0–30)
WBC # BLD: 8.4 K/UL (ref 4–11)

## 2019-01-05 PROCEDURE — 6360000002 HC RX W HCPCS: Performed by: PODIATRIST

## 2019-01-05 PROCEDURE — 1200000000 HC SEMI PRIVATE

## 2019-01-05 PROCEDURE — 6370000000 HC RX 637 (ALT 250 FOR IP): Performed by: INTERNAL MEDICINE

## 2019-01-05 PROCEDURE — 85025 COMPLETE CBC W/AUTO DIFF WBC: CPT

## 2019-01-05 PROCEDURE — 6370000000 HC RX 637 (ALT 250 FOR IP): Performed by: STUDENT IN AN ORGANIZED HEALTH CARE EDUCATION/TRAINING PROGRAM

## 2019-01-05 PROCEDURE — G0008 ADMIN INFLUENZA VIRUS VAC: HCPCS | Performed by: PODIATRIST

## 2019-01-05 PROCEDURE — 85610 PROTHROMBIN TIME: CPT

## 2019-01-05 PROCEDURE — 93010 ELECTROCARDIOGRAM REPORT: CPT | Performed by: INTERNAL MEDICINE

## 2019-01-05 PROCEDURE — 36415 COLL VENOUS BLD VENIPUNCTURE: CPT

## 2019-01-05 PROCEDURE — 90686 IIV4 VACC NO PRSV 0.5 ML IM: CPT | Performed by: PODIATRIST

## 2019-01-05 PROCEDURE — 2580000003 HC RX 258: Performed by: STUDENT IN AN ORGANIZED HEALTH CARE EDUCATION/TRAINING PROGRAM

## 2019-01-05 PROCEDURE — 6370000000 HC RX 637 (ALT 250 FOR IP): Performed by: PODIATRIST

## 2019-01-05 PROCEDURE — 85652 RBC SED RATE AUTOMATED: CPT

## 2019-01-05 PROCEDURE — 6360000002 HC RX W HCPCS: Performed by: STUDENT IN AN ORGANIZED HEALTH CARE EDUCATION/TRAINING PROGRAM

## 2019-01-05 RX ADMIN — ENOXAPARIN SODIUM 40 MG: 40 INJECTION SUBCUTANEOUS at 08:40

## 2019-01-05 RX ADMIN — GABAPENTIN 800 MG: 400 CAPSULE ORAL at 00:19

## 2019-01-05 RX ADMIN — CARVEDILOL 6.25 MG: 6.25 TABLET, FILM COATED ORAL at 20:45

## 2019-01-05 RX ADMIN — INSULIN HUMAN 10 UNITS: 100 INJECTION, SOLUTION PARENTERAL at 13:10

## 2019-01-05 RX ADMIN — AMITRIPTYLINE HYDROCHLORIDE 100 MG: 50 TABLET, FILM COATED ORAL at 20:44

## 2019-01-05 RX ADMIN — SODIUM CHLORIDE: 9 INJECTION, SOLUTION INTRAVENOUS at 18:04

## 2019-01-05 RX ADMIN — INSULIN HUMAN 8 UNITS: 100 INJECTION, SOLUTION PARENTERAL at 18:03

## 2019-01-05 RX ADMIN — INSULIN HUMAN 10 UNITS: 100 INJECTION, SOLUTION PARENTERAL at 08:36

## 2019-01-05 RX ADMIN — GABAPENTIN 800 MG: 400 CAPSULE ORAL at 18:03

## 2019-01-05 RX ADMIN — ASPIRIN 325 MG ORAL TABLET 325 MG: 325 PILL ORAL at 08:43

## 2019-01-05 RX ADMIN — GABAPENTIN 800 MG: 400 CAPSULE ORAL at 08:36

## 2019-01-05 RX ADMIN — INFLUENZA A VIRUS A/MICHIGAN/45/2015 X-275 (H1N1) ANTIGEN (FORMALDEHYDE INACTIVATED), INFLUENZA A VIRUS A/SINGAPORE/INFIMH-16-0019/2016 IVR-186 (H3N2) ANTIGEN (FORMALDEHYDE INACTIVATED), INFLUENZA B VIRUS B/PHUKET/3073/2013 ANTIGEN (FORMALDEHYDE INACTIVATED), AND INFLUENZA B VIRUS B/MARYLAND/15/2016 BX-69A ANTIGEN (FORMALDEHYDE INACTIVATED) 0.5 ML: 15; 15; 15; 15 INJECTION, SUSPENSION INTRAMUSCULAR at 08:37

## 2019-01-05 RX ADMIN — METHADONE HYDROCHLORIDE 140 MG: 5 SOLUTION ORAL at 10:53

## 2019-01-05 RX ADMIN — FUROSEMIDE 40 MG: 40 TABLET ORAL at 13:10

## 2019-01-05 RX ADMIN — CARVEDILOL 6.25 MG: 6.25 TABLET, FILM COATED ORAL at 08:43

## 2019-01-05 RX ADMIN — PIPERACILLIN SODIUM,TAZOBACTAM SODIUM 3.38 G: 3; .375 INJECTION, POWDER, FOR SOLUTION INTRAVENOUS at 05:55

## 2019-01-05 RX ADMIN — FUROSEMIDE 40 MG: 40 TABLET ORAL at 08:36

## 2019-01-05 RX ADMIN — PIPERACILLIN SODIUM,TAZOBACTAM SODIUM 3.38 G: 3; .375 INJECTION, POWDER, FOR SOLUTION INTRAVENOUS at 22:24

## 2019-01-05 RX ADMIN — GABAPENTIN 800 MG: 400 CAPSULE ORAL at 20:45

## 2019-01-05 RX ADMIN — VANCOMYCIN HYDROCHLORIDE 1250 MG: 10 INJECTION, POWDER, LYOPHILIZED, FOR SOLUTION INTRAVENOUS at 20:44

## 2019-01-05 RX ADMIN — INSULIN GLARGINE 40 UNITS: 100 INJECTION, SOLUTION SUBCUTANEOUS at 20:57

## 2019-01-05 RX ADMIN — ATORVASTATIN CALCIUM 20 MG: 20 TABLET, FILM COATED ORAL at 20:44

## 2019-01-05 RX ADMIN — PIPERACILLIN SODIUM,TAZOBACTAM SODIUM 3.38 G: 3; .375 INJECTION, POWDER, FOR SOLUTION INTRAVENOUS at 13:11

## 2019-01-05 RX ADMIN — GABAPENTIN 800 MG: 400 CAPSULE ORAL at 13:10

## 2019-01-05 RX ADMIN — PANTOPRAZOLE SODIUM 40 MG: 40 TABLET, DELAYED RELEASE ORAL at 06:39

## 2019-01-05 RX ADMIN — INSULIN HUMAN 10 UNITS: 100 INJECTION, SOLUTION PARENTERAL at 20:57

## 2019-01-05 RX ADMIN — INSULIN LISPRO 5 UNITS: 100 INJECTION, SOLUTION INTRAVENOUS; SUBCUTANEOUS at 18:04

## 2019-01-05 RX ADMIN — COLLAGENASE SANTYL: 250 OINTMENT TOPICAL at 11:06

## 2019-01-05 ASSESSMENT — PAIN SCALES - GENERAL
PAINLEVEL_OUTOF10: 2
PAINLEVEL_OUTOF10: 3
PAINLEVEL_OUTOF10: 0

## 2019-01-06 ENCOUNTER — ANESTHESIA EVENT (OUTPATIENT)
Dept: OPERATING ROOM | Age: 54
DRG: 982 | End: 2019-01-06
Payer: MEDICARE

## 2019-01-06 LAB
ANION GAP SERPL CALCULATED.3IONS-SCNC: 11 MMOL/L (ref 3–16)
BASOPHILS ABSOLUTE: 0 K/UL (ref 0–0.2)
BASOPHILS RELATIVE PERCENT: 0.4 %
BUN BLDV-MCNC: 21 MG/DL (ref 7–20)
CALCIUM SERPL-MCNC: 9 MG/DL (ref 8.3–10.6)
CHLORIDE BLD-SCNC: 100 MMOL/L (ref 99–110)
CO2: 28 MMOL/L (ref 21–32)
CREAT SERPL-MCNC: 1.3 MG/DL (ref 0.6–1.1)
EOSINOPHILS ABSOLUTE: 0.3 K/UL (ref 0–0.6)
EOSINOPHILS RELATIVE PERCENT: 3.2 %
GFR AFRICAN AMERICAN: 51
GFR NON-AFRICAN AMERICAN: 42
GLUCOSE BLD-MCNC: 177 MG/DL (ref 70–99)
GLUCOSE BLD-MCNC: 181 MG/DL (ref 70–99)
GLUCOSE BLD-MCNC: 186 MG/DL (ref 70–99)
GLUCOSE BLD-MCNC: 188 MG/DL (ref 70–99)
GLUCOSE BLD-MCNC: 189 MG/DL (ref 70–99)
GLUCOSE BLD-MCNC: 193 MG/DL (ref 70–99)
GLUCOSE BLD-MCNC: 208 MG/DL (ref 70–99)
HCG QUALITATIVE: NEGATIVE
HCT VFR BLD CALC: 31.9 % (ref 36–48)
HEMOGLOBIN: 10.3 G/DL (ref 12–16)
INR BLD: 0.94 (ref 0.86–1.14)
LYMPHOCYTES ABSOLUTE: 1.8 K/UL (ref 1–5.1)
LYMPHOCYTES RELATIVE PERCENT: 22.5 %
MCH RBC QN AUTO: 30 PG (ref 26–34)
MCHC RBC AUTO-ENTMCNC: 32.2 G/DL (ref 31–36)
MCV RBC AUTO: 93.2 FL (ref 80–100)
MONOCYTES ABSOLUTE: 0.4 K/UL (ref 0–1.3)
MONOCYTES RELATIVE PERCENT: 5 %
NEUTROPHILS ABSOLUTE: 5.4 K/UL (ref 1.7–7.7)
NEUTROPHILS RELATIVE PERCENT: 68.9 %
PDW BLD-RTO: 16.5 % (ref 12.4–15.4)
PERFORMED ON: ABNORMAL
PLATELET # BLD: 291 K/UL (ref 135–450)
PMV BLD AUTO: 8.7 FL (ref 5–10.5)
POTASSIUM SERPL-SCNC: 4.2 MMOL/L (ref 3.5–5.1)
PROTHROMBIN TIME: 10.7 SEC (ref 9.8–13)
RBC # BLD: 3.42 M/UL (ref 4–5.2)
SEDIMENTATION RATE, ERYTHROCYTE: 105 MM/HR (ref 0–30)
SODIUM BLD-SCNC: 139 MMOL/L (ref 136–145)
WBC # BLD: 7.9 K/UL (ref 4–11)

## 2019-01-06 PROCEDURE — 6370000000 HC RX 637 (ALT 250 FOR IP): Performed by: STUDENT IN AN ORGANIZED HEALTH CARE EDUCATION/TRAINING PROGRAM

## 2019-01-06 PROCEDURE — 6360000002 HC RX W HCPCS: Performed by: STUDENT IN AN ORGANIZED HEALTH CARE EDUCATION/TRAINING PROGRAM

## 2019-01-06 PROCEDURE — 2580000003 HC RX 258: Performed by: STUDENT IN AN ORGANIZED HEALTH CARE EDUCATION/TRAINING PROGRAM

## 2019-01-06 PROCEDURE — 84703 CHORIONIC GONADOTROPIN ASSAY: CPT

## 2019-01-06 PROCEDURE — 1200000000 HC SEMI PRIVATE

## 2019-01-06 PROCEDURE — 80048 BASIC METABOLIC PNL TOTAL CA: CPT

## 2019-01-06 PROCEDURE — 85652 RBC SED RATE AUTOMATED: CPT

## 2019-01-06 PROCEDURE — 6360000002 HC RX W HCPCS: Performed by: PODIATRIST

## 2019-01-06 PROCEDURE — 85610 PROTHROMBIN TIME: CPT

## 2019-01-06 PROCEDURE — 85025 COMPLETE CBC W/AUTO DIFF WBC: CPT

## 2019-01-06 PROCEDURE — 36415 COLL VENOUS BLD VENIPUNCTURE: CPT

## 2019-01-06 RX ADMIN — ENOXAPARIN SODIUM 40 MG: 40 INJECTION SUBCUTANEOUS at 09:30

## 2019-01-06 RX ADMIN — PIPERACILLIN SODIUM,TAZOBACTAM SODIUM 3.38 G: 3; .375 INJECTION, POWDER, FOR SOLUTION INTRAVENOUS at 13:38

## 2019-01-06 RX ADMIN — GABAPENTIN 800 MG: 400 CAPSULE ORAL at 13:37

## 2019-01-06 RX ADMIN — FUROSEMIDE 40 MG: 40 TABLET ORAL at 09:32

## 2019-01-06 RX ADMIN — PIPERACILLIN SODIUM,TAZOBACTAM SODIUM 3.38 G: 3; .375 INJECTION, POWDER, FOR SOLUTION INTRAVENOUS at 06:27

## 2019-01-06 RX ADMIN — GABAPENTIN 800 MG: 400 CAPSULE ORAL at 17:43

## 2019-01-06 RX ADMIN — INSULIN LISPRO 5 UNITS: 100 INJECTION, SOLUTION INTRAVENOUS; SUBCUTANEOUS at 17:46

## 2019-01-06 RX ADMIN — ASPIRIN 325 MG ORAL TABLET 325 MG: 325 PILL ORAL at 09:32

## 2019-01-06 RX ADMIN — INSULIN HUMAN 3 UNITS: 100 INJECTION, SOLUTION PARENTERAL at 20:37

## 2019-01-06 RX ADMIN — INSULIN HUMAN 6 UNITS: 100 INJECTION, SOLUTION PARENTERAL at 17:46

## 2019-01-06 RX ADMIN — METHADONE HYDROCHLORIDE 140 MG: 5 SOLUTION ORAL at 09:30

## 2019-01-06 RX ADMIN — COLLAGENASE SANTYL: 250 OINTMENT TOPICAL at 09:32

## 2019-01-06 RX ADMIN — SODIUM CHLORIDE: 9 INJECTION, SOLUTION INTRAVENOUS at 13:41

## 2019-01-06 RX ADMIN — CARVEDILOL 6.25 MG: 6.25 TABLET, FILM COATED ORAL at 20:37

## 2019-01-06 RX ADMIN — FUROSEMIDE 40 MG: 40 TABLET ORAL at 13:38

## 2019-01-06 RX ADMIN — CARVEDILOL 6.25 MG: 6.25 TABLET, FILM COATED ORAL at 09:32

## 2019-01-06 RX ADMIN — AMITRIPTYLINE HYDROCHLORIDE 100 MG: 50 TABLET, FILM COATED ORAL at 20:36

## 2019-01-06 RX ADMIN — PIPERACILLIN SODIUM,TAZOBACTAM SODIUM 3.38 G: 3; .375 INJECTION, POWDER, FOR SOLUTION INTRAVENOUS at 22:49

## 2019-01-06 RX ADMIN — PANTOPRAZOLE SODIUM 40 MG: 40 TABLET, DELAYED RELEASE ORAL at 06:27

## 2019-01-06 RX ADMIN — GABAPENTIN 800 MG: 400 CAPSULE ORAL at 20:37

## 2019-01-06 RX ADMIN — GABAPENTIN 800 MG: 400 CAPSULE ORAL at 09:32

## 2019-01-06 RX ADMIN — INSULIN LISPRO 5 UNITS: 100 INJECTION, SOLUTION INTRAVENOUS; SUBCUTANEOUS at 13:38

## 2019-01-06 RX ADMIN — INSULIN LISPRO 5 UNITS: 100 INJECTION, SOLUTION INTRAVENOUS; SUBCUTANEOUS at 09:30

## 2019-01-06 RX ADMIN — INSULIN HUMAN 3 UNITS: 100 INJECTION, SOLUTION PARENTERAL at 09:29

## 2019-01-06 RX ADMIN — ATORVASTATIN CALCIUM 20 MG: 20 TABLET, FILM COATED ORAL at 20:37

## 2019-01-06 RX ADMIN — INSULIN HUMAN 3 UNITS: 100 INJECTION, SOLUTION PARENTERAL at 06:26

## 2019-01-06 RX ADMIN — VANCOMYCIN HYDROCHLORIDE 1250 MG: 10 INJECTION, POWDER, LYOPHILIZED, FOR SOLUTION INTRAVENOUS at 20:37

## 2019-01-06 ASSESSMENT — PAIN SCALES - GENERAL
PAINLEVEL_OUTOF10: 0

## 2019-01-07 ENCOUNTER — ANESTHESIA (OUTPATIENT)
Dept: OPERATING ROOM | Age: 54
DRG: 982 | End: 2019-01-07
Payer: MEDICARE

## 2019-01-07 VITALS
SYSTOLIC BLOOD PRESSURE: 127 MMHG | BODY MASS INDEX: 35.51 KG/M2 | OXYGEN SATURATION: 94 % | WEIGHT: 193 LBS | RESPIRATION RATE: 16 BRPM | TEMPERATURE: 98.6 F | HEIGHT: 62 IN | DIASTOLIC BLOOD PRESSURE: 61 MMHG | HEART RATE: 76 BPM

## 2019-01-07 VITALS — SYSTOLIC BLOOD PRESSURE: 122 MMHG | DIASTOLIC BLOOD PRESSURE: 74 MMHG | OXYGEN SATURATION: 93 %

## 2019-01-07 LAB
ANION GAP SERPL CALCULATED.3IONS-SCNC: 12 MMOL/L (ref 3–16)
BASOPHILS ABSOLUTE: 0 K/UL (ref 0–0.2)
BASOPHILS RELATIVE PERCENT: 0.4 %
BUN BLDV-MCNC: 23 MG/DL (ref 7–20)
CALCIUM SERPL-MCNC: 9.2 MG/DL (ref 8.3–10.6)
CHLORIDE BLD-SCNC: 100 MMOL/L (ref 99–110)
CO2: 27 MMOL/L (ref 21–32)
CREAT SERPL-MCNC: 1.3 MG/DL (ref 0.6–1.1)
EOSINOPHILS ABSOLUTE: 0.2 K/UL (ref 0–0.6)
EOSINOPHILS RELATIVE PERCENT: 2.7 %
GFR AFRICAN AMERICAN: 51
GFR NON-AFRICAN AMERICAN: 42
GLUCOSE BLD-MCNC: 176 MG/DL (ref 70–99)
GLUCOSE BLD-MCNC: 213 MG/DL (ref 70–99)
GLUCOSE BLD-MCNC: 229 MG/DL (ref 70–99)
GLUCOSE BLD-MCNC: 296 MG/DL (ref 70–99)
GRAM STAIN RESULT: ABNORMAL
HCT VFR BLD CALC: 32.1 % (ref 36–48)
HEMOGLOBIN: 10.5 G/DL (ref 12–16)
INR BLD: 0.93 (ref 0.86–1.14)
LYMPHOCYTES ABSOLUTE: 1.9 K/UL (ref 1–5.1)
LYMPHOCYTES RELATIVE PERCENT: 25.3 %
MCH RBC QN AUTO: 30.1 PG (ref 26–34)
MCHC RBC AUTO-ENTMCNC: 32.6 G/DL (ref 31–36)
MCV RBC AUTO: 92.3 FL (ref 80–100)
MONOCYTES ABSOLUTE: 0.4 K/UL (ref 0–1.3)
MONOCYTES RELATIVE PERCENT: 5.1 %
NEUTROPHILS ABSOLUTE: 5 K/UL (ref 1.7–7.7)
NEUTROPHILS RELATIVE PERCENT: 66.5 %
ORGANISM: ABNORMAL
PDW BLD-RTO: 16.8 % (ref 12.4–15.4)
PERFORMED ON: ABNORMAL
PLATELET # BLD: 285 K/UL (ref 135–450)
PMV BLD AUTO: 8.7 FL (ref 5–10.5)
POTASSIUM REFLEX MAGNESIUM: 4.5 MMOL/L (ref 3.5–5.1)
PROTHROMBIN TIME: 10.6 SEC (ref 9.8–13)
RBC # BLD: 3.48 M/UL (ref 4–5.2)
SEDIMENTATION RATE, ERYTHROCYTE: 93 MM/HR (ref 0–30)
SODIUM BLD-SCNC: 139 MMOL/L (ref 136–145)
WBC # BLD: 7.5 K/UL (ref 4–11)
WOUND/ABSCESS: ABNORMAL

## 2019-01-07 PROCEDURE — 2580000003 HC RX 258: Performed by: PODIATRIST

## 2019-01-07 PROCEDURE — 87102 FUNGUS ISOLATION CULTURE: CPT

## 2019-01-07 PROCEDURE — 87206 SMEAR FLUORESCENT/ACID STAI: CPT

## 2019-01-07 PROCEDURE — 6370000000 HC RX 637 (ALT 250 FOR IP): Performed by: STUDENT IN AN ORGANIZED HEALTH CARE EDUCATION/TRAINING PROGRAM

## 2019-01-07 PROCEDURE — 6360000002 HC RX W HCPCS: Performed by: NURSE ANESTHETIST, CERTIFIED REGISTERED

## 2019-01-07 PROCEDURE — 85610 PROTHROMBIN TIME: CPT

## 2019-01-07 PROCEDURE — C1713 ANCHOR/SCREW BN/BN,TIS/BN: HCPCS | Performed by: PODIATRIST

## 2019-01-07 PROCEDURE — 85652 RBC SED RATE AUTOMATED: CPT

## 2019-01-07 PROCEDURE — 87205 SMEAR GRAM STAIN: CPT

## 2019-01-07 PROCEDURE — 3700000001 HC ADD 15 MINUTES (ANESTHESIA): Performed by: PODIATRIST

## 2019-01-07 PROCEDURE — 87186 SC STD MICRODIL/AGAR DIL: CPT

## 2019-01-07 PROCEDURE — 3600000002 HC SURGERY LEVEL 2 BASE: Performed by: PODIATRIST

## 2019-01-07 PROCEDURE — 2709999900 HC NON-CHARGEABLE SUPPLY: Performed by: PODIATRIST

## 2019-01-07 PROCEDURE — 0HRMXK3 REPLACEMENT OF RIGHT FOOT SKIN WITH NONAUTOLOGOUS TISSUE SUBSTITUTE, FULL THICKNESS, EXTERNAL APPROACH: ICD-10-PCS | Performed by: PODIATRIST

## 2019-01-07 PROCEDURE — 87116 MYCOBACTERIA CULTURE: CPT

## 2019-01-07 PROCEDURE — 0SBM0ZZ EXCISION OF RIGHT METATARSAL-PHALANGEAL JOINT, OPEN APPROACH: ICD-10-PCS | Performed by: PODIATRIST

## 2019-01-07 PROCEDURE — 87077 CULTURE AEROBIC IDENTIFY: CPT

## 2019-01-07 PROCEDURE — 7100000000 HC PACU RECOVERY - FIRST 15 MIN: Performed by: PODIATRIST

## 2019-01-07 PROCEDURE — 3700000000 HC ANESTHESIA ATTENDED CARE: Performed by: PODIATRIST

## 2019-01-07 PROCEDURE — 6360000002 HC RX W HCPCS: Performed by: PODIATRIST

## 2019-01-07 PROCEDURE — 2580000003 HC RX 258: Performed by: NURSE ANESTHETIST, CERTIFIED REGISTERED

## 2019-01-07 PROCEDURE — 3600000012 HC SURGERY LEVEL 2 ADDTL 15MIN: Performed by: PODIATRIST

## 2019-01-07 PROCEDURE — 2580000003 HC RX 258: Performed by: STUDENT IN AN ORGANIZED HEALTH CARE EDUCATION/TRAINING PROGRAM

## 2019-01-07 PROCEDURE — 6360000002 HC RX W HCPCS: Performed by: STUDENT IN AN ORGANIZED HEALTH CARE EDUCATION/TRAINING PROGRAM

## 2019-01-07 PROCEDURE — 87015 SPECIMEN INFECT AGNT CONCNTJ: CPT

## 2019-01-07 PROCEDURE — 99233 SBSQ HOSP IP/OBS HIGH 50: CPT | Performed by: INTERNAL MEDICINE

## 2019-01-07 PROCEDURE — 87075 CULTR BACTERIA EXCEPT BLOOD: CPT

## 2019-01-07 PROCEDURE — 7100000001 HC PACU RECOVERY - ADDTL 15 MIN: Performed by: PODIATRIST

## 2019-01-07 PROCEDURE — 36415 COLL VENOUS BLD VENIPUNCTURE: CPT

## 2019-01-07 PROCEDURE — 87070 CULTURE OTHR SPECIMN AEROBIC: CPT

## 2019-01-07 PROCEDURE — 80048 BASIC METABOLIC PNL TOTAL CA: CPT

## 2019-01-07 PROCEDURE — 85025 COMPLETE CBC W/AUTO DIFF WBC: CPT

## 2019-01-07 DEVICE — GRAFT HUM TISS W3XL6CM CRYOPRESERVED PLCNTA TISS UMB AMNION: Type: IMPLANTABLE DEVICE | Site: FOURTH TOE | Status: FUNCTIONAL

## 2019-01-07 DEVICE — ALLOGRAFT HUM TISS 1 CC AMNION AMNIFLO CRYOPRESERVED: Type: IMPLANTABLE DEVICE | Site: FOURTH TOE | Status: FUNCTIONAL

## 2019-01-07 RX ORDER — FENTANYL CITRATE 50 UG/ML
25 INJECTION, SOLUTION INTRAMUSCULAR; INTRAVENOUS EVERY 5 MIN PRN
Status: DISCONTINUED | OUTPATIENT
Start: 2019-01-07 | End: 2019-01-07 | Stop reason: HOSPADM

## 2019-01-07 RX ORDER — AMOXICILLIN AND CLAVULANATE POTASSIUM 500; 125 MG/1; MG/1
1 TABLET, FILM COATED ORAL 2 TIMES DAILY
Qty: 28 TABLET | Refills: 0 | Status: SHIPPED | OUTPATIENT
Start: 2019-01-07 | End: 2019-01-21

## 2019-01-07 RX ORDER — SODIUM CHLORIDE, SODIUM LACTATE, POTASSIUM CHLORIDE, CALCIUM CHLORIDE 600; 310; 30; 20 MG/100ML; MG/100ML; MG/100ML; MG/100ML
INJECTION, SOLUTION INTRAVENOUS CONTINUOUS
Status: DISCONTINUED | OUTPATIENT
Start: 2019-01-07 | End: 2019-01-07 | Stop reason: HOSPADM

## 2019-01-07 RX ORDER — PROMETHAZINE HYDROCHLORIDE 25 MG/ML
6.25 INJECTION, SOLUTION INTRAMUSCULAR; INTRAVENOUS
Status: DISCONTINUED | OUTPATIENT
Start: 2019-01-07 | End: 2019-01-07 | Stop reason: HOSPADM

## 2019-01-07 RX ORDER — CIPROFLOXACIN 500 MG/1
500 TABLET, FILM COATED ORAL 2 TIMES DAILY
Qty: 28 TABLET | Refills: 0 | Status: SHIPPED | OUTPATIENT
Start: 2019-01-07 | End: 2019-01-21

## 2019-01-07 RX ORDER — PROPOFOL 10 MG/ML
INJECTION, EMULSION INTRAVENOUS CONTINUOUS PRN
Status: DISCONTINUED | OUTPATIENT
Start: 2019-01-07 | End: 2019-01-07 | Stop reason: SDUPTHER

## 2019-01-07 RX ORDER — ONDANSETRON 2 MG/ML
4 INJECTION INTRAMUSCULAR; INTRAVENOUS
Status: DISCONTINUED | OUTPATIENT
Start: 2019-01-07 | End: 2019-01-07 | Stop reason: HOSPADM

## 2019-01-07 RX ORDER — MIDAZOLAM HYDROCHLORIDE 1 MG/ML
INJECTION INTRAMUSCULAR; INTRAVENOUS PRN
Status: DISCONTINUED | OUTPATIENT
Start: 2019-01-07 | End: 2019-01-07 | Stop reason: SDUPTHER

## 2019-01-07 RX ORDER — FENTANYL CITRATE 50 UG/ML
50 INJECTION, SOLUTION INTRAMUSCULAR; INTRAVENOUS EVERY 5 MIN PRN
Status: DISCONTINUED | OUTPATIENT
Start: 2019-01-07 | End: 2019-01-07 | Stop reason: HOSPADM

## 2019-01-07 RX ORDER — SODIUM CHLORIDE 9 MG/ML
INJECTION, SOLUTION INTRAVENOUS CONTINUOUS PRN
Status: DISCONTINUED | OUTPATIENT
Start: 2019-01-07 | End: 2019-01-07 | Stop reason: SDUPTHER

## 2019-01-07 RX ORDER — METHADONE HYDROCHLORIDE 10 MG/1
140 TABLET ORAL DAILY
Status: DISCONTINUED | OUTPATIENT
Start: 2019-01-07 | End: 2019-01-07 | Stop reason: HOSPADM

## 2019-01-07 RX ADMIN — INSULIN HUMAN 10 UNITS: 100 INJECTION, SOLUTION PARENTERAL at 18:38

## 2019-01-07 RX ADMIN — CARVEDILOL 6.25 MG: 6.25 TABLET, FILM COATED ORAL at 09:36

## 2019-01-07 RX ADMIN — INSULIN LISPRO 5 UNITS: 100 INJECTION, SOLUTION INTRAVENOUS; SUBCUTANEOUS at 18:36

## 2019-01-07 RX ADMIN — INSULIN HUMAN 8 UNITS: 100 INJECTION, SOLUTION PARENTERAL at 18:32

## 2019-01-07 RX ADMIN — FUROSEMIDE 40 MG: 40 TABLET ORAL at 15:16

## 2019-01-07 RX ADMIN — ATORVASTATIN CALCIUM 20 MG: 20 TABLET, FILM COATED ORAL at 15:15

## 2019-01-07 RX ADMIN — PROPOFOL 100 MCG/KG/MIN: 10 INJECTION, EMULSION INTRAVENOUS at 10:16

## 2019-01-07 RX ADMIN — SODIUM CHLORIDE: 900 INJECTION, SOLUTION INTRAVENOUS at 10:08

## 2019-01-07 RX ADMIN — GABAPENTIN 800 MG: 400 CAPSULE ORAL at 18:34

## 2019-01-07 RX ADMIN — SODIUM CHLORIDE: 900 INJECTION, SOLUTION INTRAVENOUS at 10:39

## 2019-01-07 RX ADMIN — ASPIRIN 325 MG ORAL TABLET 325 MG: 325 PILL ORAL at 15:16

## 2019-01-07 RX ADMIN — METHADONE HYDROCHLORIDE 140 MG: 10 TABLET ORAL at 09:36

## 2019-01-07 RX ADMIN — MIDAZOLAM HYDROCHLORIDE 2 MG: 1 INJECTION INTRAMUSCULAR; INTRAVENOUS at 10:08

## 2019-01-07 RX ADMIN — PIPERACILLIN SODIUM,TAZOBACTAM SODIUM 3.38 G: 3; .375 INJECTION, POWDER, FOR SOLUTION INTRAVENOUS at 15:15

## 2019-01-07 RX ADMIN — GABAPENTIN 800 MG: 400 CAPSULE ORAL at 15:15

## 2019-01-07 ASSESSMENT — PULMONARY FUNCTION TESTS
PIF_VALUE: 0
PIF_VALUE: 1
PIF_VALUE: 0
PIF_VALUE: 1
PIF_VALUE: 0
PIF_VALUE: 1
PIF_VALUE: 0
PIF_VALUE: 1
PIF_VALUE: 1
PIF_VALUE: 0
PIF_VALUE: 1
PIF_VALUE: 0
PIF_VALUE: 1
PIF_VALUE: 1
PIF_VALUE: 0
PIF_VALUE: 0
PIF_VALUE: 1
PIF_VALUE: 0

## 2019-01-07 ASSESSMENT — LIFESTYLE VARIABLES: SMOKING_STATUS: 0

## 2019-01-07 ASSESSMENT — PAIN SCALES - GENERAL
PAINLEVEL_OUTOF10: 0

## 2019-01-07 ASSESSMENT — PAIN - FUNCTIONAL ASSESSMENT: PAIN_FUNCTIONAL_ASSESSMENT: 0-10

## 2019-01-08 ENCOUNTER — OFFICE VISIT (OUTPATIENT)
Dept: INTERNAL MEDICINE CLINIC | Age: 54
End: 2019-01-08
Payer: MEDICARE

## 2019-01-08 DIAGNOSIS — Z47.89 CAST DISCOMFORT: ICD-10-CM

## 2019-01-08 DIAGNOSIS — Z98.890 S/P FOOT SURGERY, RIGHT: Primary | ICD-10-CM

## 2019-01-08 LAB
BLOOD BANK DISPENSE STATUS: NORMAL
BLOOD BANK DISPENSE STATUS: NORMAL
BLOOD BANK PRODUCT CODE: NORMAL
BLOOD BANK PRODUCT CODE: NORMAL
BPU ID: NORMAL
BPU ID: NORMAL
DESCRIPTION BLOOD BANK: NORMAL
DESCRIPTION BLOOD BANK: NORMAL

## 2019-01-08 PROCEDURE — 99213 OFFICE O/P EST LOW 20 MIN: CPT | Performed by: STUDENT IN AN ORGANIZED HEALTH CARE EDUCATION/TRAINING PROGRAM

## 2019-01-08 PROCEDURE — 29405 APPL SHORT LEG CAST: CPT

## 2019-01-09 LAB
GRAM STAIN RESULT: ABNORMAL
ORGANISM: ABNORMAL
ORGANISM: ABNORMAL
WOUND/ABSCESS: ABNORMAL

## 2019-01-12 LAB — ANAEROBIC CULTURE: NORMAL

## 2019-01-15 ENCOUNTER — OFFICE VISIT (OUTPATIENT)
Dept: INTERNAL MEDICINE CLINIC | Age: 54
End: 2019-01-15
Payer: MEDICARE

## 2019-01-15 DIAGNOSIS — Z98.890 S/P FOOT SURGERY, RIGHT: Primary | ICD-10-CM

## 2019-01-15 PROCEDURE — 29405 APPL SHORT LEG CAST: CPT

## 2019-01-15 PROCEDURE — 99213 OFFICE O/P EST LOW 20 MIN: CPT | Performed by: STUDENT IN AN ORGANIZED HEALTH CARE EDUCATION/TRAINING PROGRAM

## 2019-01-25 ENCOUNTER — TELEPHONE (OUTPATIENT)
Dept: INTERNAL MEDICINE CLINIC | Age: 54
End: 2019-01-25

## 2019-01-28 ENCOUNTER — OFFICE VISIT (OUTPATIENT)
Dept: INTERNAL MEDICINE CLINIC | Age: 54
End: 2019-01-28
Payer: MEDICARE

## 2019-01-28 DIAGNOSIS — E11.621 DIABETIC ULCER OF TOE OF LEFT FOOT ASSOCIATED WITH TYPE 2 DIABETES MELLITUS, WITH BONE INVOLVEMENT WITHOUT EVIDENCE OF NECROSIS (HCC): ICD-10-CM

## 2019-01-28 DIAGNOSIS — L97.526 DIABETIC ULCER OF TOE OF LEFT FOOT ASSOCIATED WITH TYPE 2 DIABETES MELLITUS, WITH BONE INVOLVEMENT WITHOUT EVIDENCE OF NECROSIS (HCC): ICD-10-CM

## 2019-01-28 DIAGNOSIS — L97.511 DIABETIC ULCER OF OTHER PART OF RIGHT FOOT ASSOCIATED WITH TYPE 2 DIABETES MELLITUS, LIMITED TO BREAKDOWN OF SKIN (HCC): Primary | ICD-10-CM

## 2019-01-28 DIAGNOSIS — E11.621 DIABETIC ULCER OF OTHER PART OF RIGHT FOOT ASSOCIATED WITH TYPE 2 DIABETES MELLITUS, LIMITED TO BREAKDOWN OF SKIN (HCC): Primary | ICD-10-CM

## 2019-01-28 PROCEDURE — 99213 OFFICE O/P EST LOW 20 MIN: CPT | Performed by: STUDENT IN AN ORGANIZED HEALTH CARE EDUCATION/TRAINING PROGRAM

## 2019-02-06 ENCOUNTER — APPOINTMENT (OUTPATIENT)
Dept: CT IMAGING | Age: 54
DRG: 202 | End: 2019-02-06
Payer: COMMERCIAL

## 2019-02-06 ENCOUNTER — HOSPITAL ENCOUNTER (INPATIENT)
Age: 54
LOS: 2 days | Discharge: HOME | DRG: 202 | End: 2019-02-08
Attending: EMERGENCY MEDICINE | Admitting: INTERNAL MEDICINE
Payer: COMMERCIAL

## 2019-02-06 ENCOUNTER — APPOINTMENT (OUTPATIENT)
Dept: GENERAL RADIOLOGY | Age: 54
DRG: 202 | End: 2019-02-06
Payer: COMMERCIAL

## 2019-02-06 DIAGNOSIS — A41.9 SEPTICEMIA (HCC): Primary | ICD-10-CM

## 2019-02-06 DIAGNOSIS — R40.0 SOMNOLENCE: ICD-10-CM

## 2019-02-06 PROBLEM — J40 BRONCHITIS: Status: ACTIVE | Noted: 2019-02-06

## 2019-02-06 LAB
ANION GAP SERPL CALCULATED.3IONS-SCNC: 12 MMOL/L (ref 3–16)
BASE EXCESS VENOUS: 5 (ref -3–3)
BASOPHILS ABSOLUTE: 0.1 K/UL (ref 0–0.2)
BASOPHILS RELATIVE PERCENT: 0.7 %
BUN BLDV-MCNC: 24 MG/DL (ref 7–20)
CALCIUM SERPL-MCNC: 9.8 MG/DL (ref 8.3–10.6)
CHLORIDE BLD-SCNC: 96 MMOL/L (ref 99–110)
CO2: 29 MMOL/L (ref 21–32)
CREAT SERPL-MCNC: 1.9 MG/DL (ref 0.6–1.1)
EKG ATRIAL RATE: 81 BPM
EKG DIAGNOSIS: NORMAL
EKG P AXIS: 53 DEGREES
EKG P-R INTERVAL: 152 MS
EKG Q-T INTERVAL: 392 MS
EKG QRS DURATION: 80 MS
EKG QTC CALCULATION (BAZETT): 455 MS
EKG R AXIS: 16 DEGREES
EKG T AXIS: 59 DEGREES
EKG VENTRICULAR RATE: 81 BPM
EOSINOPHILS ABSOLUTE: 0.1 K/UL (ref 0–0.6)
EOSINOPHILS RELATIVE PERCENT: 0.4 %
GFR AFRICAN AMERICAN: 33
GFR NON-AFRICAN AMERICAN: 27
GLUCOSE BLD-MCNC: 129 MG/DL (ref 70–99)
GLUCOSE BLD-MCNC: 148 MG/DL (ref 70–99)
HCO3 VENOUS: 30.3 MMOL/L (ref 23–29)
HCT VFR BLD CALC: 34.2 % (ref 36–48)
HEMOGLOBIN: 11.1 G/DL (ref 12–16)
LACTATE: 1.55 MMOL/L (ref 0.4–2)
LACTIC ACID: 0.9 MMOL/L (ref 0.4–2)
LYMPHOCYTES ABSOLUTE: 2.2 K/UL (ref 1–5.1)
LYMPHOCYTES RELATIVE PERCENT: 13.2 %
MCH RBC QN AUTO: 28.5 PG (ref 26–34)
MCHC RBC AUTO-ENTMCNC: 32.4 G/DL (ref 31–36)
MCV RBC AUTO: 88 FL (ref 80–100)
MONOCYTES ABSOLUTE: 1 K/UL (ref 0–1.3)
MONOCYTES RELATIVE PERCENT: 5.7 %
NEUTROPHILS ABSOLUTE: 13.5 K/UL (ref 1.7–7.7)
NEUTROPHILS RELATIVE PERCENT: 80 %
O2 SAT, VEN: 62 %
PCO2, VEN: 50.1 MM HG (ref 40–50)
PDW BLD-RTO: 15.7 % (ref 12.4–15.4)
PERFORMED ON: ABNORMAL
PERFORMED ON: ABNORMAL
PH VENOUS: 7.39 (ref 7.35–7.45)
PLATELET # BLD: 347 K/UL (ref 135–450)
PMV BLD AUTO: 8.3 FL (ref 5–10.5)
PO2, VEN: 33 MM HG
POC SAMPLE TYPE: ABNORMAL
POTASSIUM REFLEX MAGNESIUM: 4.5 MMOL/L (ref 3.5–5.1)
PRO-BNP: 787 PG/ML (ref 0–124)
RBC # BLD: 3.89 M/UL (ref 4–5.2)
SODIUM BLD-SCNC: 137 MMOL/L (ref 136–145)
TCO2 CALC VENOUS: 32 MMOL/L
TROPONIN: 0.03 NG/ML
WBC # BLD: 16.8 K/UL (ref 4–11)

## 2019-02-06 PROCEDURE — 6360000002 HC RX W HCPCS: Performed by: PHYSICIAN ASSISTANT

## 2019-02-06 PROCEDURE — 89220 SPUTUM SPECIMEN COLLECTION: CPT

## 2019-02-06 PROCEDURE — 85025 COMPLETE CBC W/AUTO DIFF WBC: CPT

## 2019-02-06 PROCEDURE — 94640 AIRWAY INHALATION TREATMENT: CPT

## 2019-02-06 PROCEDURE — 96365 THER/PROPH/DIAG IV INF INIT: CPT

## 2019-02-06 PROCEDURE — 80048 BASIC METABOLIC PNL TOTAL CA: CPT

## 2019-02-06 PROCEDURE — 94761 N-INVAS EAR/PLS OXIMETRY MLT: CPT

## 2019-02-06 PROCEDURE — 36415 COLL VENOUS BLD VENIPUNCTURE: CPT

## 2019-02-06 PROCEDURE — 83880 ASSAY OF NATRIURETIC PEPTIDE: CPT

## 2019-02-06 PROCEDURE — 70450 CT HEAD/BRAIN W/O DYE: CPT

## 2019-02-06 PROCEDURE — 87040 BLOOD CULTURE FOR BACTERIA: CPT

## 2019-02-06 PROCEDURE — 1200000000 HC SEMI PRIVATE

## 2019-02-06 PROCEDURE — 87486 CHLMYD PNEUM DNA AMP PROBE: CPT

## 2019-02-06 PROCEDURE — 87804 INFLUENZA ASSAY W/OPTIC: CPT

## 2019-02-06 PROCEDURE — G0378 HOSPITAL OBSERVATION PER HR: HCPCS

## 2019-02-06 PROCEDURE — 87633 RESP VIRUS 12-25 TARGETS: CPT

## 2019-02-06 PROCEDURE — 82803 BLOOD GASES ANY COMBINATION: CPT

## 2019-02-06 PROCEDURE — 6370000000 HC RX 637 (ALT 250 FOR IP): Performed by: STUDENT IN AN ORGANIZED HEALTH CARE EDUCATION/TRAINING PROGRAM

## 2019-02-06 PROCEDURE — 71046 X-RAY EXAM CHEST 2 VIEWS: CPT

## 2019-02-06 PROCEDURE — 94664 DEMO&/EVAL PT USE INHALER: CPT

## 2019-02-06 PROCEDURE — 96361 HYDRATE IV INFUSION ADD-ON: CPT

## 2019-02-06 PROCEDURE — 99285 EMERGENCY DEPT VISIT HI MDM: CPT

## 2019-02-06 PROCEDURE — 87581 M.PNEUMON DNA AMP PROBE: CPT

## 2019-02-06 PROCEDURE — 2700000000 HC OXYGEN THERAPY PER DAY

## 2019-02-06 PROCEDURE — 84484 ASSAY OF TROPONIN QUANT: CPT

## 2019-02-06 PROCEDURE — 2580000003 HC RX 258: Performed by: STUDENT IN AN ORGANIZED HEALTH CARE EDUCATION/TRAINING PROGRAM

## 2019-02-06 PROCEDURE — 2580000003 HC RX 258: Performed by: EMERGENCY MEDICINE

## 2019-02-06 PROCEDURE — 6370000000 HC RX 637 (ALT 250 FOR IP): Performed by: PHYSICIAN ASSISTANT

## 2019-02-06 PROCEDURE — 93005 ELECTROCARDIOGRAM TRACING: CPT | Performed by: PHYSICIAN ASSISTANT

## 2019-02-06 PROCEDURE — 87798 DETECT AGENT NOS DNA AMP: CPT

## 2019-02-06 PROCEDURE — 83605 ASSAY OF LACTIC ACID: CPT

## 2019-02-06 PROCEDURE — 6360000002 HC RX W HCPCS: Performed by: STUDENT IN AN ORGANIZED HEALTH CARE EDUCATION/TRAINING PROGRAM

## 2019-02-06 RX ORDER — 0.9 % SODIUM CHLORIDE 0.9 %
1000 INTRAVENOUS SOLUTION INTRAVENOUS ONCE
Status: COMPLETED | OUTPATIENT
Start: 2019-02-06 | End: 2019-02-06

## 2019-02-06 RX ORDER — AZITHROMYCIN 250 MG/1
500 TABLET, FILM COATED ORAL ONCE
Status: COMPLETED | OUTPATIENT
Start: 2019-02-06 | End: 2019-02-06

## 2019-02-06 RX ORDER — PREDNISONE 20 MG/1
40 TABLET ORAL ONCE
Status: COMPLETED | OUTPATIENT
Start: 2019-02-06 | End: 2019-02-06

## 2019-02-06 RX ORDER — SODIUM CHLORIDE, SODIUM LACTATE, POTASSIUM CHLORIDE, CALCIUM CHLORIDE 600; 310; 30; 20 MG/100ML; MG/100ML; MG/100ML; MG/100ML
1000 INJECTION, SOLUTION INTRAVENOUS ONCE
Status: COMPLETED | OUTPATIENT
Start: 2019-02-06 | End: 2019-02-07

## 2019-02-06 RX ORDER — ALBUTEROL SULFATE 2.5 MG/3ML
2.5 SOLUTION RESPIRATORY (INHALATION) ONCE
Status: COMPLETED | OUTPATIENT
Start: 2019-02-06 | End: 2019-02-06

## 2019-02-06 RX ORDER — IPRATROPIUM BROMIDE AND ALBUTEROL SULFATE 2.5; .5 MG/3ML; MG/3ML
1 SOLUTION RESPIRATORY (INHALATION) ONCE
Status: COMPLETED | OUTPATIENT
Start: 2019-02-06 | End: 2019-02-06

## 2019-02-06 RX ADMIN — AZITHROMYCIN 500 MG: 250 TABLET, FILM COATED ORAL at 19:34

## 2019-02-06 RX ADMIN — PREDNISONE 40 MG: 20 TABLET ORAL at 19:34

## 2019-02-06 RX ADMIN — SODIUM CHLORIDE 1000 ML: 0.9 INJECTION, SOLUTION INTRAVENOUS at 20:23

## 2019-02-06 RX ADMIN — ALBUTEROL SULFATE 2.5 MG: 2.5 SOLUTION RESPIRATORY (INHALATION) at 18:41

## 2019-02-06 RX ADMIN — VANCOMYCIN HYDROCHLORIDE 1500 MG: 10 INJECTION, POWDER, LYOPHILIZED, FOR SOLUTION INTRAVENOUS at 21:56

## 2019-02-06 RX ADMIN — SODIUM CHLORIDE, POTASSIUM CHLORIDE, SODIUM LACTATE AND CALCIUM CHLORIDE 1000 ML: 600; 310; 30; 20 INJECTION, SOLUTION INTRAVENOUS at 21:56

## 2019-02-06 RX ADMIN — CEFEPIME 2 G: 2 INJECTION, POWDER, FOR SOLUTION INTRAMUSCULAR; INTRAVENOUS at 21:16

## 2019-02-06 RX ADMIN — IPRATROPIUM BROMIDE AND ALBUTEROL SULFATE 1 AMPULE: .5; 3 SOLUTION RESPIRATORY (INHALATION) at 16:56

## 2019-02-06 ASSESSMENT — ENCOUNTER SYMPTOMS
COLOR CHANGE: 0
COUGH: 1
RHINORRHEA: 1
SINUS PAIN: 1
PHOTOPHOBIA: 0
SINUS PRESSURE: 1
NAUSEA: 0
BACK PAIN: 1
ABDOMINAL PAIN: 0
SHORTNESS OF BREATH: 1
VOMITING: 0
CHEST TIGHTNESS: 1
DIARRHEA: 0
BLOOD IN STOOL: 0
CONSTIPATION: 0
GASTROINTESTINAL NEGATIVE: 1
SORE THROAT: 0
SHORTNESS OF BREATH: 0

## 2019-02-06 ASSESSMENT — PAIN SCALES - GENERAL: PAINLEVEL_OUTOF10: 0

## 2019-02-07 LAB
ANION GAP SERPL CALCULATED.3IONS-SCNC: 12 MMOL/L (ref 3–16)
BASOPHILS ABSOLUTE: 0 K/UL (ref 0–0.2)
BASOPHILS RELATIVE PERCENT: 0.1 %
BUN BLDV-MCNC: 26 MG/DL (ref 7–20)
CALCIUM SERPL-MCNC: 8.7 MG/DL (ref 8.3–10.6)
CHLORIDE BLD-SCNC: 96 MMOL/L (ref 99–110)
CO2: 24 MMOL/L (ref 21–32)
CREAT SERPL-MCNC: 1.6 MG/DL (ref 0.6–1.1)
EOSINOPHILS ABSOLUTE: 0 K/UL (ref 0–0.6)
EOSINOPHILS RELATIVE PERCENT: 0 %
GFR AFRICAN AMERICAN: 40
GFR NON-AFRICAN AMERICAN: 33
GLUCOSE BLD-MCNC: 224 MG/DL (ref 70–99)
GLUCOSE BLD-MCNC: 289 MG/DL (ref 70–99)
GLUCOSE BLD-MCNC: 303 MG/DL (ref 70–99)
GLUCOSE BLD-MCNC: 313 MG/DL (ref 70–99)
GLUCOSE BLD-MCNC: 372 MG/DL (ref 70–99)
GLUCOSE BLD-MCNC: 376 MG/DL (ref 70–99)
GLUCOSE BLD-MCNC: 465 MG/DL (ref 70–99)
HCT VFR BLD CALC: 32.6 % (ref 36–48)
HEMOGLOBIN: 10.5 G/DL (ref 12–16)
LYMPHOCYTES ABSOLUTE: 1.3 K/UL (ref 1–5.1)
LYMPHOCYTES RELATIVE PERCENT: 8.3 %
MCH RBC QN AUTO: 28.8 PG (ref 26–34)
MCHC RBC AUTO-ENTMCNC: 32.1 G/DL (ref 31–36)
MCV RBC AUTO: 89.7 FL (ref 80–100)
MONOCYTES ABSOLUTE: 0.3 K/UL (ref 0–1.3)
MONOCYTES RELATIVE PERCENT: 1.9 %
NEUTROPHILS ABSOLUTE: 13.5 K/UL (ref 1.7–7.7)
NEUTROPHILS RELATIVE PERCENT: 89.7 %
ORGANISM: ABNORMAL
PDW BLD-RTO: 15.5 % (ref 12.4–15.4)
PERFORMED ON: ABNORMAL
PLATELET # BLD: 290 K/UL (ref 135–450)
PMV BLD AUTO: 8.4 FL (ref 5–10.5)
POTASSIUM REFLEX MAGNESIUM: 5.3 MMOL/L (ref 3.5–5.1)
PROCALCITONIN: 0.12 NG/ML (ref 0–0.15)
RAPID INFLUENZA  B AGN: NEGATIVE
RAPID INFLUENZA A AGN: NEGATIVE
RBC # BLD: 3.63 M/UL (ref 4–5.2)
REPORT: NORMAL
RESPIRATORY PANEL PCR: ABNORMAL
RESPIRATORY PANEL PCR: ABNORMAL
SODIUM BLD-SCNC: 132 MMOL/L (ref 136–145)
TROPONIN: 0.01 NG/ML
TROPONIN: <0.01 NG/ML
WBC # BLD: 15.1 K/UL (ref 4–11)

## 2019-02-07 PROCEDURE — 94664 DEMO&/EVAL PT USE INHALER: CPT

## 2019-02-07 PROCEDURE — 6370000000 HC RX 637 (ALT 250 FOR IP): Performed by: STUDENT IN AN ORGANIZED HEALTH CARE EDUCATION/TRAINING PROGRAM

## 2019-02-07 PROCEDURE — 86738 MYCOPLASMA ANTIBODY: CPT

## 2019-02-07 PROCEDURE — 94761 N-INVAS EAR/PLS OXIMETRY MLT: CPT

## 2019-02-07 PROCEDURE — 6370000000 HC RX 637 (ALT 250 FOR IP): Performed by: INTERNAL MEDICINE

## 2019-02-07 PROCEDURE — 85025 COMPLETE CBC W/AUTO DIFF WBC: CPT

## 2019-02-07 PROCEDURE — 84484 ASSAY OF TROPONIN QUANT: CPT

## 2019-02-07 PROCEDURE — 87449 NOS EACH ORGANISM AG IA: CPT

## 2019-02-07 PROCEDURE — G0378 HOSPITAL OBSERVATION PER HR: HCPCS

## 2019-02-07 PROCEDURE — 94668 MNPJ CHEST WALL SBSQ: CPT

## 2019-02-07 PROCEDURE — 36415 COLL VENOUS BLD VENIPUNCTURE: CPT

## 2019-02-07 PROCEDURE — 80048 BASIC METABOLIC PNL TOTAL CA: CPT

## 2019-02-07 PROCEDURE — 1200000000 HC SEMI PRIVATE

## 2019-02-07 PROCEDURE — 94640 AIRWAY INHALATION TREATMENT: CPT

## 2019-02-07 PROCEDURE — 2700000000 HC OXYGEN THERAPY PER DAY

## 2019-02-07 PROCEDURE — 94667 MNPJ CHEST WALL 1ST: CPT

## 2019-02-07 PROCEDURE — 84145 PROCALCITONIN (PCT): CPT

## 2019-02-07 PROCEDURE — 6360000002 HC RX W HCPCS: Performed by: STUDENT IN AN ORGANIZED HEALTH CARE EDUCATION/TRAINING PROGRAM

## 2019-02-07 PROCEDURE — 6360000002 HC RX W HCPCS: Performed by: INTERNAL MEDICINE

## 2019-02-07 PROCEDURE — 94150 VITAL CAPACITY TEST: CPT

## 2019-02-07 PROCEDURE — 2580000003 HC RX 258: Performed by: STUDENT IN AN ORGANIZED HEALTH CARE EDUCATION/TRAINING PROGRAM

## 2019-02-07 RX ORDER — IPRATROPIUM BROMIDE AND ALBUTEROL SULFATE 2.5; .5 MG/3ML; MG/3ML
1 SOLUTION RESPIRATORY (INHALATION) 3 TIMES DAILY
Status: DISCONTINUED | OUTPATIENT
Start: 2019-02-07 | End: 2019-02-08 | Stop reason: HOSPADM

## 2019-02-07 RX ORDER — ONDANSETRON 2 MG/ML
4 INJECTION INTRAMUSCULAR; INTRAVENOUS EVERY 6 HOURS PRN
Status: DISCONTINUED | OUTPATIENT
Start: 2019-02-07 | End: 2019-02-08 | Stop reason: HOSPADM

## 2019-02-07 RX ORDER — SODIUM CHLORIDE 0.9 % (FLUSH) 0.9 %
10 SYRINGE (ML) INJECTION EVERY 12 HOURS SCHEDULED
Status: DISCONTINUED | OUTPATIENT
Start: 2019-02-07 | End: 2019-02-08 | Stop reason: HOSPADM

## 2019-02-07 RX ORDER — SODIUM CHLORIDE 0.9 % (FLUSH) 0.9 %
10 SYRINGE (ML) INJECTION PRN
Status: DISCONTINUED | OUTPATIENT
Start: 2019-02-07 | End: 2019-02-08 | Stop reason: HOSPADM

## 2019-02-07 RX ORDER — FORMOTEROL FUMARATE 20 UG/2ML
20 SOLUTION RESPIRATORY (INHALATION) 2 TIMES DAILY
Status: DISCONTINUED | OUTPATIENT
Start: 2019-02-07 | End: 2019-02-08 | Stop reason: HOSPADM

## 2019-02-07 RX ORDER — DEXTROSE MONOHYDRATE 25 G/50ML
12.5 INJECTION, SOLUTION INTRAVENOUS PRN
Status: DISCONTINUED | OUTPATIENT
Start: 2019-02-07 | End: 2019-02-08 | Stop reason: HOSPADM

## 2019-02-07 RX ORDER — NICOTINE 21 MG/24HR
1 PATCH, TRANSDERMAL 24 HOURS TRANSDERMAL EVERY 24 HOURS
Status: DISCONTINUED | OUTPATIENT
Start: 2019-02-07 | End: 2019-02-08 | Stop reason: HOSPADM

## 2019-02-07 RX ORDER — IPRATROPIUM BROMIDE AND ALBUTEROL SULFATE 2.5; .5 MG/3ML; MG/3ML
1 SOLUTION RESPIRATORY (INHALATION) EVERY 4 HOURS PRN
Status: DISCONTINUED | OUTPATIENT
Start: 2019-02-07 | End: 2019-02-08 | Stop reason: HOSPADM

## 2019-02-07 RX ORDER — CARVEDILOL 6.25 MG/1
6.25 TABLET ORAL 2 TIMES DAILY
Status: DISCONTINUED | OUTPATIENT
Start: 2019-02-07 | End: 2019-02-07

## 2019-02-07 RX ORDER — AMOXICILLIN 250 MG/1
500 CAPSULE ORAL EVERY 8 HOURS SCHEDULED
Status: DISCONTINUED | OUTPATIENT
Start: 2019-02-07 | End: 2019-02-08 | Stop reason: HOSPADM

## 2019-02-07 RX ORDER — NICOTINE POLACRILEX 4 MG
15 LOZENGE BUCCAL PRN
Status: DISCONTINUED | OUTPATIENT
Start: 2019-02-07 | End: 2019-02-08 | Stop reason: HOSPADM

## 2019-02-07 RX ORDER — GABAPENTIN 400 MG/1
800 CAPSULE ORAL 4 TIMES DAILY
Status: DISCONTINUED | OUTPATIENT
Start: 2019-02-07 | End: 2019-02-08 | Stop reason: HOSPADM

## 2019-02-07 RX ORDER — DEXTROSE MONOHYDRATE 50 MG/ML
100 INJECTION, SOLUTION INTRAVENOUS PRN
Status: DISCONTINUED | OUTPATIENT
Start: 2019-02-07 | End: 2019-02-08 | Stop reason: HOSPADM

## 2019-02-07 RX ORDER — GUAIFENESIN 600 MG/1
600 TABLET, EXTENDED RELEASE ORAL 2 TIMES DAILY
Status: DISCONTINUED | OUTPATIENT
Start: 2019-02-07 | End: 2019-02-08 | Stop reason: HOSPADM

## 2019-02-07 RX ORDER — METHADONE HYDROCHLORIDE 10 MG/1
140 TABLET ORAL DAILY
Status: DISCONTINUED | OUTPATIENT
Start: 2019-02-07 | End: 2019-02-08 | Stop reason: HOSPADM

## 2019-02-07 RX ORDER — AMITRIPTYLINE HYDROCHLORIDE 50 MG/1
100 TABLET, FILM COATED ORAL NIGHTLY
Status: DISCONTINUED | OUTPATIENT
Start: 2019-02-07 | End: 2019-02-08 | Stop reason: HOSPADM

## 2019-02-07 RX ORDER — LEVOFLOXACIN 500 MG/1
500 TABLET, FILM COATED ORAL DAILY
Status: DISCONTINUED | OUTPATIENT
Start: 2019-02-07 | End: 2019-02-07

## 2019-02-07 RX ORDER — METOPROLOL SUCCINATE 50 MG/1
50 TABLET, EXTENDED RELEASE ORAL NIGHTLY
Status: DISCONTINUED | OUTPATIENT
Start: 2019-02-07 | End: 2019-02-08 | Stop reason: HOSPADM

## 2019-02-07 RX ORDER — METHADONE HYDROCHLORIDE 10 MG/1
140 TABLET ORAL EVERY MORNING
Status: ON HOLD | COMMUNITY
End: 2019-06-12 | Stop reason: SDUPTHER

## 2019-02-07 RX ORDER — BUDESONIDE 0.5 MG/2ML
0.25 INHALANT ORAL 2 TIMES DAILY
Status: DISCONTINUED | OUTPATIENT
Start: 2019-02-07 | End: 2019-02-08 | Stop reason: HOSPADM

## 2019-02-07 RX ORDER — ATORVASTATIN CALCIUM 20 MG/1
20 TABLET, FILM COATED ORAL DAILY
Status: DISCONTINUED | OUTPATIENT
Start: 2019-02-07 | End: 2019-02-08 | Stop reason: HOSPADM

## 2019-02-07 RX ORDER — PANTOPRAZOLE SODIUM 40 MG/1
40 TABLET, DELAYED RELEASE ORAL
Status: DISCONTINUED | OUTPATIENT
Start: 2019-02-07 | End: 2019-02-08 | Stop reason: HOSPADM

## 2019-02-07 RX ORDER — ACETAMINOPHEN 325 MG/1
650 TABLET ORAL EVERY 4 HOURS PRN
Status: DISCONTINUED | OUTPATIENT
Start: 2019-02-07 | End: 2019-02-08 | Stop reason: HOSPADM

## 2019-02-07 RX ORDER — ASPIRIN 325 MG
325 TABLET ORAL DAILY
Status: DISCONTINUED | OUTPATIENT
Start: 2019-02-07 | End: 2019-02-08 | Stop reason: HOSPADM

## 2019-02-07 RX ORDER — SODIUM CHLORIDE 9 MG/ML
INJECTION, SOLUTION INTRAVENOUS CONTINUOUS
Status: DISCONTINUED | OUTPATIENT
Start: 2019-02-07 | End: 2019-02-08 | Stop reason: HOSPADM

## 2019-02-07 RX ORDER — PREDNISONE 20 MG/1
40 TABLET ORAL DAILY
Status: DISCONTINUED | OUTPATIENT
Start: 2019-02-07 | End: 2019-02-08 | Stop reason: HOSPADM

## 2019-02-07 RX ORDER — HEPARIN SODIUM 5000 [USP'U]/ML
5000 INJECTION, SOLUTION INTRAVENOUS; SUBCUTANEOUS EVERY 8 HOURS SCHEDULED
Status: DISCONTINUED | OUTPATIENT
Start: 2019-02-07 | End: 2019-02-08 | Stop reason: HOSPADM

## 2019-02-07 RX ORDER — IPRATROPIUM BROMIDE AND ALBUTEROL SULFATE 2.5; .5 MG/3ML; MG/3ML
1 SOLUTION RESPIRATORY (INHALATION)
Status: DISCONTINUED | OUTPATIENT
Start: 2019-02-07 | End: 2019-02-07

## 2019-02-07 RX ADMIN — METHADONE HYDROCHLORIDE 140 MG: 10 TABLET ORAL at 09:46

## 2019-02-07 RX ADMIN — HEPARIN SODIUM 5000 UNITS: 5000 INJECTION INTRAVENOUS; SUBCUTANEOUS at 06:04

## 2019-02-07 RX ADMIN — FORMOTEROL FUMARATE DIHYDRATE 20 MCG: 20 SOLUTION RESPIRATORY (INHALATION) at 20:55

## 2019-02-07 RX ADMIN — SODIUM CHLORIDE: 9 INJECTION, SOLUTION INTRAVENOUS at 14:13

## 2019-02-07 RX ADMIN — INSULIN LISPRO 12 UNITS: 100 INJECTION, SOLUTION INTRAVENOUS; SUBCUTANEOUS at 11:44

## 2019-02-07 RX ADMIN — PREDNISONE 40 MG: 20 TABLET ORAL at 13:53

## 2019-02-07 RX ADMIN — INSULIN GLARGINE 35 UNITS: 100 INJECTION, SOLUTION SUBCUTANEOUS at 22:07

## 2019-02-07 RX ADMIN — GABAPENTIN 800 MG: 400 CAPSULE ORAL at 22:03

## 2019-02-07 RX ADMIN — INSULIN LISPRO 10 UNITS: 100 INJECTION, SOLUTION INTRAVENOUS; SUBCUTANEOUS at 17:15

## 2019-02-07 RX ADMIN — PANTOPRAZOLE SODIUM 40 MG: 40 TABLET, DELAYED RELEASE ORAL at 06:04

## 2019-02-07 RX ADMIN — Medication 10 ML: at 08:03

## 2019-02-07 RX ADMIN — HEPARIN SODIUM 5000 UNITS: 5000 INJECTION INTRAVENOUS; SUBCUTANEOUS at 22:05

## 2019-02-07 RX ADMIN — INSULIN LISPRO 10 UNITS: 100 INJECTION, SOLUTION INTRAVENOUS; SUBCUTANEOUS at 07:53

## 2019-02-07 RX ADMIN — ATORVASTATIN CALCIUM 20 MG: 20 TABLET, FILM COATED ORAL at 07:51

## 2019-02-07 RX ADMIN — HEPARIN SODIUM 5000 UNITS: 5000 INJECTION INTRAVENOUS; SUBCUTANEOUS at 14:25

## 2019-02-07 RX ADMIN — IPRATROPIUM BROMIDE AND ALBUTEROL SULFATE 1 AMPULE: .5; 3 SOLUTION RESPIRATORY (INHALATION) at 15:30

## 2019-02-07 RX ADMIN — GUAIFENESIN 600 MG: 600 TABLET, EXTENDED RELEASE ORAL at 07:51

## 2019-02-07 RX ADMIN — GABAPENTIN 800 MG: 400 CAPSULE ORAL at 17:13

## 2019-02-07 RX ADMIN — Medication 10 ML: at 22:05

## 2019-02-07 RX ADMIN — SODIUM CHLORIDE: 9 INJECTION, SOLUTION INTRAVENOUS at 02:34

## 2019-02-07 RX ADMIN — GABAPENTIN 800 MG: 400 CAPSULE ORAL at 13:58

## 2019-02-07 RX ADMIN — INSULIN GLARGINE 20 UNITS: 100 INJECTION, SOLUTION SUBCUTANEOUS at 02:08

## 2019-02-07 RX ADMIN — GABAPENTIN 800 MG: 400 CAPSULE ORAL at 07:51

## 2019-02-07 RX ADMIN — IPRATROPIUM BROMIDE AND ALBUTEROL SULFATE 1 AMPULE: .5; 3 SOLUTION RESPIRATORY (INHALATION) at 20:55

## 2019-02-07 RX ADMIN — INSULIN HUMAN 10 UNITS: 100 INJECTION, SOLUTION PARENTERAL at 13:53

## 2019-02-07 RX ADMIN — INSULIN LISPRO 5 UNITS: 100 INJECTION, SOLUTION INTRAVENOUS; SUBCUTANEOUS at 05:00

## 2019-02-07 RX ADMIN — GUAIFENESIN 600 MG: 600 TABLET, EXTENDED RELEASE ORAL at 02:07

## 2019-02-07 RX ADMIN — CARVEDILOL 6.25 MG: 6.25 TABLET, FILM COATED ORAL at 07:52

## 2019-02-07 RX ADMIN — AMOXICILLIN 500 MG: 250 CAPSULE ORAL at 22:15

## 2019-02-07 RX ADMIN — AMOXICILLIN 500 MG: 250 CAPSULE ORAL at 08:03

## 2019-02-07 RX ADMIN — COLLAGENASE SANTYL: 250 OINTMENT TOPICAL at 07:52

## 2019-02-07 RX ADMIN — IPRATROPIUM BROMIDE AND ALBUTEROL SULFATE 1 AMPULE: .5; 3 SOLUTION RESPIRATORY (INHALATION) at 09:08

## 2019-02-07 RX ADMIN — GUAIFENESIN 600 MG: 600 TABLET, EXTENDED RELEASE ORAL at 22:03

## 2019-02-07 RX ADMIN — METOPROLOL SUCCINATE 50 MG: 50 TABLET, EXTENDED RELEASE ORAL at 22:04

## 2019-02-07 RX ADMIN — INSULIN LISPRO 12 UNITS: 100 INJECTION, SOLUTION INTRAVENOUS; SUBCUTANEOUS at 17:14

## 2019-02-07 RX ADMIN — ASPIRIN 325 MG ORAL TABLET 325 MG: 325 PILL ORAL at 07:51

## 2019-02-07 RX ADMIN — BUDESONIDE 250 MCG: 0.5 SUSPENSION RESPIRATORY (INHALATION) at 20:55

## 2019-02-07 RX ADMIN — FORMOTEROL FUMARATE DIHYDRATE 20 MCG: 20 SOLUTION RESPIRATORY (INHALATION) at 09:08

## 2019-02-07 RX ADMIN — BUDESONIDE 250 MCG: 0.5 SUSPENSION RESPIRATORY (INHALATION) at 09:08

## 2019-02-07 ASSESSMENT — ENCOUNTER SYMPTOMS
ABDOMINAL PAIN: 0
PHOTOPHOBIA: 0
ABDOMINAL DISTENTION: 0
VOMITING: 0
BACK PAIN: 1
DIARRHEA: 0
SINUS PAIN: 1
SHORTNESS OF BREATH: 1
STRIDOR: 0
CONSTIPATION: 0
RHINORRHEA: 1
COLOR CHANGE: 0
SINUS PRESSURE: 1
WHEEZING: 0
COUGH: 1
NAUSEA: 0
CHEST TIGHTNESS: 0

## 2019-02-07 ASSESSMENT — PAIN SCALES - GENERAL
PAINLEVEL_OUTOF10: 0

## 2019-02-08 ENCOUNTER — TELEPHONE (OUTPATIENT)
Dept: INTERNAL MEDICINE CLINIC | Age: 54
End: 2019-02-08

## 2019-02-08 VITALS
HEIGHT: 62 IN | HEART RATE: 72 BPM | OXYGEN SATURATION: 96 % | DIASTOLIC BLOOD PRESSURE: 84 MMHG | SYSTOLIC BLOOD PRESSURE: 162 MMHG | TEMPERATURE: 98.2 F | BODY MASS INDEX: 34.72 KG/M2 | RESPIRATION RATE: 16 BRPM | WEIGHT: 188.7 LBS

## 2019-02-08 LAB
ANION GAP SERPL CALCULATED.3IONS-SCNC: 11 MMOL/L (ref 3–16)
BASOPHILS ABSOLUTE: 0 K/UL (ref 0–0.2)
BASOPHILS RELATIVE PERCENT: 0.1 %
BUN BLDV-MCNC: 28 MG/DL (ref 7–20)
CALCIUM SERPL-MCNC: 9.3 MG/DL (ref 8.3–10.6)
CHLORIDE BLD-SCNC: 102 MMOL/L (ref 99–110)
CO2: 25 MMOL/L (ref 21–32)
CREAT SERPL-MCNC: 1.3 MG/DL (ref 0.6–1.1)
EOSINOPHILS ABSOLUTE: 0 K/UL (ref 0–0.6)
EOSINOPHILS RELATIVE PERCENT: 0 %
GFR AFRICAN AMERICAN: 51
GFR NON-AFRICAN AMERICAN: 42
GLUCOSE BLD-MCNC: 127 MG/DL (ref 70–99)
GLUCOSE BLD-MCNC: 285 MG/DL (ref 70–99)
GLUCOSE BLD-MCNC: 308 MG/DL (ref 70–99)
GLUCOSE BLD-MCNC: 316 MG/DL (ref 70–99)
HCT VFR BLD CALC: 33.1 % (ref 36–48)
HEMOGLOBIN: 10.7 G/DL (ref 12–16)
L. PNEUMOPHILA SEROGP 1 UR AG: NORMAL
LYMPHOCYTES ABSOLUTE: 1.3 K/UL (ref 1–5.1)
LYMPHOCYTES RELATIVE PERCENT: 11.5 %
MCH RBC QN AUTO: 28.7 PG (ref 26–34)
MCHC RBC AUTO-ENTMCNC: 32.5 G/DL (ref 31–36)
MCV RBC AUTO: 88.3 FL (ref 80–100)
MONOCYTES ABSOLUTE: 0.4 K/UL (ref 0–1.3)
MONOCYTES RELATIVE PERCENT: 3.1 %
NEUTROPHILS ABSOLUTE: 9.7 K/UL (ref 1.7–7.7)
NEUTROPHILS RELATIVE PERCENT: 85.3 %
PDW BLD-RTO: 15.5 % (ref 12.4–15.4)
PERFORMED ON: ABNORMAL
PLATELET # BLD: 305 K/UL (ref 135–450)
PMV BLD AUTO: 8.4 FL (ref 5–10.5)
POTASSIUM REFLEX MAGNESIUM: 5 MMOL/L (ref 3.5–5.1)
RBC # BLD: 3.75 M/UL (ref 4–5.2)
SODIUM BLD-SCNC: 138 MMOL/L (ref 136–145)
STREP PNEUMONIAE ANTIGEN, URINE: NORMAL
WBC # BLD: 11.3 K/UL (ref 4–11)

## 2019-02-08 PROCEDURE — 36415 COLL VENOUS BLD VENIPUNCTURE: CPT

## 2019-02-08 PROCEDURE — 6360000002 HC RX W HCPCS: Performed by: STUDENT IN AN ORGANIZED HEALTH CARE EDUCATION/TRAINING PROGRAM

## 2019-02-08 PROCEDURE — 6370000000 HC RX 637 (ALT 250 FOR IP): Performed by: STUDENT IN AN ORGANIZED HEALTH CARE EDUCATION/TRAINING PROGRAM

## 2019-02-08 PROCEDURE — G0378 HOSPITAL OBSERVATION PER HR: HCPCS

## 2019-02-08 PROCEDURE — 94761 N-INVAS EAR/PLS OXIMETRY MLT: CPT

## 2019-02-08 PROCEDURE — 2580000003 HC RX 258: Performed by: STUDENT IN AN ORGANIZED HEALTH CARE EDUCATION/TRAINING PROGRAM

## 2019-02-08 PROCEDURE — 6360000002 HC RX W HCPCS: Performed by: INTERNAL MEDICINE

## 2019-02-08 PROCEDURE — 94640 AIRWAY INHALATION TREATMENT: CPT

## 2019-02-08 PROCEDURE — 6370000000 HC RX 637 (ALT 250 FOR IP): Performed by: INTERNAL MEDICINE

## 2019-02-08 PROCEDURE — 85025 COMPLETE CBC W/AUTO DIFF WBC: CPT

## 2019-02-08 PROCEDURE — 80048 BASIC METABOLIC PNL TOTAL CA: CPT

## 2019-02-08 RX ORDER — GABAPENTIN 400 MG/1
800 CAPSULE ORAL 4 TIMES DAILY
Qty: 112 CAPSULE | Refills: 0 | Status: SHIPPED | OUTPATIENT
Start: 2019-02-08 | End: 2019-02-11

## 2019-02-08 RX ORDER — AMOXICILLIN 500 MG/1
500 CAPSULE ORAL 3 TIMES DAILY
Qty: 9 CAPSULE | Refills: 0 | Status: SHIPPED | OUTPATIENT
Start: 2019-02-08 | End: 2019-02-11

## 2019-02-08 RX ORDER — AMITRIPTYLINE HYDROCHLORIDE 100 MG/1
100 TABLET, FILM COATED ORAL NIGHTLY
Qty: 14 TABLET | Refills: 0 | Status: SHIPPED | OUTPATIENT
Start: 2019-02-08 | End: 2019-02-11 | Stop reason: SDUPTHER

## 2019-02-08 RX ORDER — PREDNISONE 20 MG/1
20 TABLET ORAL DAILY
Qty: 3 TABLET | Refills: 0 | Status: SHIPPED | OUTPATIENT
Start: 2019-02-08 | End: 2019-02-11

## 2019-02-08 RX ORDER — FUROSEMIDE 40 MG/1
40 TABLET ORAL 2 TIMES DAILY
Qty: 28 TABLET | Refills: 0 | Status: SHIPPED | OUTPATIENT
Start: 2019-02-08 | End: 2019-02-11 | Stop reason: SDUPTHER

## 2019-02-08 RX ORDER — METOPROLOL SUCCINATE 50 MG/1
50 TABLET, EXTENDED RELEASE ORAL NIGHTLY
Qty: 30 TABLET | Refills: 1 | Status: SHIPPED | OUTPATIENT
Start: 2019-02-08 | End: 2019-02-19 | Stop reason: SDUPTHER

## 2019-02-08 RX ORDER — ALBUTEROL SULFATE 90 UG/1
2 AEROSOL, METERED RESPIRATORY (INHALATION) EVERY 6 HOURS PRN
Qty: 1 INHALER | Refills: 3 | Status: SHIPPED | OUTPATIENT
Start: 2019-02-08 | End: 2019-02-19 | Stop reason: SDUPTHER

## 2019-02-08 RX ADMIN — INSULIN LISPRO 10 UNITS: 100 INJECTION, SOLUTION INTRAVENOUS; SUBCUTANEOUS at 12:44

## 2019-02-08 RX ADMIN — INSULIN LISPRO 12 UNITS: 100 INJECTION, SOLUTION INTRAVENOUS; SUBCUTANEOUS at 08:09

## 2019-02-08 RX ADMIN — HEPARIN SODIUM 5000 UNITS: 5000 INJECTION INTRAVENOUS; SUBCUTANEOUS at 06:42

## 2019-02-08 RX ADMIN — GABAPENTIN 800 MG: 400 CAPSULE ORAL at 17:40

## 2019-02-08 RX ADMIN — GUAIFENESIN 600 MG: 600 TABLET, EXTENDED RELEASE ORAL at 09:59

## 2019-02-08 RX ADMIN — INSULIN LISPRO 10 UNITS: 100 INJECTION, SOLUTION INTRAVENOUS; SUBCUTANEOUS at 08:09

## 2019-02-08 RX ADMIN — INSULIN LISPRO 9 UNITS: 100 INJECTION, SOLUTION INTRAVENOUS; SUBCUTANEOUS at 17:33

## 2019-02-08 RX ADMIN — METHADONE HYDROCHLORIDE 140 MG: 10 TABLET ORAL at 10:00

## 2019-02-08 RX ADMIN — ASPIRIN 325 MG ORAL TABLET 325 MG: 325 PILL ORAL at 08:04

## 2019-02-08 RX ADMIN — GABAPENTIN 800 MG: 400 CAPSULE ORAL at 14:46

## 2019-02-08 RX ADMIN — IPRATROPIUM BROMIDE AND ALBUTEROL SULFATE 1 AMPULE: .5; 3 SOLUTION RESPIRATORY (INHALATION) at 11:02

## 2019-02-08 RX ADMIN — PANTOPRAZOLE SODIUM 40 MG: 40 TABLET, DELAYED RELEASE ORAL at 06:42

## 2019-02-08 RX ADMIN — ATORVASTATIN CALCIUM 20 MG: 20 TABLET, FILM COATED ORAL at 08:04

## 2019-02-08 RX ADMIN — GABAPENTIN 800 MG: 400 CAPSULE ORAL at 08:04

## 2019-02-08 RX ADMIN — INSULIN LISPRO 10 UNITS: 100 INJECTION, SOLUTION INTRAVENOUS; SUBCUTANEOUS at 17:34

## 2019-02-08 RX ADMIN — AMOXICILLIN 500 MG: 250 CAPSULE ORAL at 14:46

## 2019-02-08 RX ADMIN — FORMOTEROL FUMARATE DIHYDRATE 20 MCG: 20 SOLUTION RESPIRATORY (INHALATION) at 11:03

## 2019-02-08 RX ADMIN — AMOXICILLIN 500 MG: 250 CAPSULE ORAL at 06:42

## 2019-02-08 RX ADMIN — Medication 10 ML: at 08:05

## 2019-02-08 RX ADMIN — BUDESONIDE 500 MCG: 0.5 SUSPENSION RESPIRATORY (INHALATION) at 11:03

## 2019-02-08 RX ADMIN — HEPARIN SODIUM 5000 UNITS: 5000 INJECTION INTRAVENOUS; SUBCUTANEOUS at 14:47

## 2019-02-08 RX ADMIN — COLLAGENASE SANTYL: 250 OINTMENT TOPICAL at 08:06

## 2019-02-08 RX ADMIN — PREDNISONE 40 MG: 20 TABLET ORAL at 08:04

## 2019-02-08 ASSESSMENT — PAIN SCALES - GENERAL
PAINLEVEL_OUTOF10: 0

## 2019-02-11 ENCOUNTER — OFFICE VISIT (OUTPATIENT)
Dept: INTERNAL MEDICINE CLINIC | Age: 54
End: 2019-02-11
Payer: COMMERCIAL

## 2019-02-11 VITALS
WEIGHT: 188 LBS | OXYGEN SATURATION: 98 % | HEART RATE: 83 BPM | RESPIRATION RATE: 20 BRPM | HEIGHT: 62 IN | DIASTOLIC BLOOD PRESSURE: 85 MMHG | BODY MASS INDEX: 34.6 KG/M2 | TEMPERATURE: 99.2 F | SYSTOLIC BLOOD PRESSURE: 200 MMHG

## 2019-02-11 DIAGNOSIS — E11.42 TYPE 2 DIABETES MELLITUS WITH DIABETIC POLYNEUROPATHY, WITH LONG-TERM CURRENT USE OF INSULIN (HCC): Primary | ICD-10-CM

## 2019-02-11 DIAGNOSIS — Z79.4 TYPE 2 DIABETES MELLITUS WITH DIABETIC POLYNEUROPATHY, WITH LONG-TERM CURRENT USE OF INSULIN (HCC): Primary | ICD-10-CM

## 2019-02-11 DIAGNOSIS — J96.00 ACUTE RESPIRATORY FAILURE, UNSPECIFIED WHETHER WITH HYPOXIA OR HYPERCAPNIA (HCC): ICD-10-CM

## 2019-02-11 DIAGNOSIS — N18.30 CHRONIC KIDNEY DISEASE, STAGE III (MODERATE) (HCC): ICD-10-CM

## 2019-02-11 DIAGNOSIS — J45.21 MILD INTERMITTENT ASTHMA WITH ACUTE EXACERBATION: ICD-10-CM

## 2019-02-11 DIAGNOSIS — I10 ESSENTIAL HYPERTENSION: ICD-10-CM

## 2019-02-11 DIAGNOSIS — Z87.891 HISTORY OF CIGARETTE SMOKING: ICD-10-CM

## 2019-02-11 DIAGNOSIS — E78.5 DYSLIPIDEMIA: ICD-10-CM

## 2019-02-11 DIAGNOSIS — I50.30 HEART FAILURE WITH PRESERVED EJECTION FRACTION (HCC): ICD-10-CM

## 2019-02-11 DIAGNOSIS — F11.90 OPIOID USE DISORDER: ICD-10-CM

## 2019-02-11 DIAGNOSIS — E11.42 DIABETIC POLYNEUROPATHY ASSOCIATED WITH TYPE 2 DIABETES MELLITUS (HCC): ICD-10-CM

## 2019-02-11 LAB
BLOOD CULTURE, ROUTINE: NORMAL
CULTURE, BLOOD 2: NORMAL
FUNGUS (MYCOLOGY) CULTURE: ABNORMAL
FUNGUS (MYCOLOGY) CULTURE: ABNORMAL
FUNGUS STAIN: ABNORMAL
ORGANISM: ABNORMAL

## 2019-02-11 PROCEDURE — 99213 OFFICE O/P EST LOW 20 MIN: CPT | Performed by: INTERNAL MEDICINE

## 2019-02-11 RX ORDER — FUROSEMIDE 40 MG/1
40 TABLET ORAL 2 TIMES DAILY
Qty: 60 TABLET | Refills: 3 | Status: SHIPPED | OUTPATIENT
Start: 2019-02-11 | End: 2020-01-21 | Stop reason: SDUPTHER

## 2019-02-11 RX ORDER — AMITRIPTYLINE HYDROCHLORIDE 100 MG/1
100 TABLET, FILM COATED ORAL NIGHTLY
Qty: 30 TABLET | Refills: 2 | Status: SHIPPED | OUTPATIENT
Start: 2019-02-11 | End: 2019-05-06 | Stop reason: SDUPTHER

## 2019-02-11 RX ORDER — GABAPENTIN 800 MG/1
800 TABLET ORAL 4 TIMES DAILY
Qty: 120 TABLET | Refills: 2 | Status: SHIPPED | OUTPATIENT
Start: 2019-02-11 | End: 2019-05-07 | Stop reason: SDUPTHER

## 2019-02-18 ENCOUNTER — OFFICE VISIT (OUTPATIENT)
Dept: INTERNAL MEDICINE CLINIC | Age: 54
End: 2019-02-18
Payer: COMMERCIAL

## 2019-02-18 VITALS
OXYGEN SATURATION: 96 % | TEMPERATURE: 98.5 F | DIASTOLIC BLOOD PRESSURE: 68 MMHG | RESPIRATION RATE: 16 BRPM | HEART RATE: 75 BPM | SYSTOLIC BLOOD PRESSURE: 116 MMHG

## 2019-02-18 DIAGNOSIS — E11.621 DIABETIC ULCER OF OTHER PART OF RIGHT FOOT ASSOCIATED WITH TYPE 2 DIABETES MELLITUS, LIMITED TO BREAKDOWN OF SKIN (HCC): Primary | ICD-10-CM

## 2019-02-18 DIAGNOSIS — Z79.4 TYPE 2 DIABETES MELLITUS WITH DIABETIC POLYNEUROPATHY, WITH LONG-TERM CURRENT USE OF INSULIN (HCC): Primary | ICD-10-CM

## 2019-02-18 DIAGNOSIS — E11.621 DIABETIC ULCER OF TOE OF LEFT FOOT ASSOCIATED WITH TYPE 2 DIABETES MELLITUS, WITH FAT LAYER EXPOSED (HCC): ICD-10-CM

## 2019-02-18 DIAGNOSIS — F11.90 OPIOID USE DISORDER: ICD-10-CM

## 2019-02-18 DIAGNOSIS — E78.5 DYSLIPIDEMIA: ICD-10-CM

## 2019-02-18 DIAGNOSIS — L97.522 DIABETIC ULCER OF TOE OF LEFT FOOT ASSOCIATED WITH TYPE 2 DIABETES MELLITUS, WITH FAT LAYER EXPOSED (HCC): ICD-10-CM

## 2019-02-18 DIAGNOSIS — Z87.891 HISTORY OF CIGARETTE SMOKING: ICD-10-CM

## 2019-02-18 DIAGNOSIS — L97.511 DIABETIC ULCER OF OTHER PART OF RIGHT FOOT ASSOCIATED WITH TYPE 2 DIABETES MELLITUS, LIMITED TO BREAKDOWN OF SKIN (HCC): Primary | ICD-10-CM

## 2019-02-18 DIAGNOSIS — E11.42 TYPE 2 DIABETES MELLITUS WITH DIABETIC POLYNEUROPATHY, WITH LONG-TERM CURRENT USE OF INSULIN (HCC): Primary | ICD-10-CM

## 2019-02-18 DIAGNOSIS — I10 ESSENTIAL HYPERTENSION: ICD-10-CM

## 2019-02-18 LAB
GLUCOSE BLD-MCNC: 168 MG/DL (ref 70–99)
PERFORMED ON: ABNORMAL

## 2019-02-18 PROCEDURE — 99213 OFFICE O/P EST LOW 20 MIN: CPT | Performed by: INTERNAL MEDICINE

## 2019-02-18 PROCEDURE — 99213 OFFICE O/P EST LOW 20 MIN: CPT | Performed by: STUDENT IN AN ORGANIZED HEALTH CARE EDUCATION/TRAINING PROGRAM

## 2019-02-18 PROCEDURE — 29405 APPL SHORT LEG CAST: CPT

## 2019-02-19 DIAGNOSIS — E78.5 DYSLIPIDEMIA: ICD-10-CM

## 2019-02-19 DIAGNOSIS — E11.42 DIABETIC POLYNEUROPATHY ASSOCIATED WITH TYPE 2 DIABETES MELLITUS (HCC): Chronic | ICD-10-CM

## 2019-02-19 RX ORDER — ATORVASTATIN CALCIUM 20 MG/1
20 TABLET, FILM COATED ORAL DAILY
Qty: 30 TABLET | Refills: 5 | OUTPATIENT
Start: 2019-02-19 | End: 2019-07-15 | Stop reason: SDUPTHER

## 2019-02-19 RX ORDER — OMEPRAZOLE 20 MG/1
20 CAPSULE, DELAYED RELEASE ORAL DAILY
Qty: 30 CAPSULE | Refills: 5 | OUTPATIENT
Start: 2019-02-19 | End: 2019-06-09 | Stop reason: CLARIF

## 2019-02-19 RX ORDER — ALBUTEROL SULFATE 90 UG/1
2 AEROSOL, METERED RESPIRATORY (INHALATION) EVERY 6 HOURS PRN
Qty: 1 INHALER | Refills: 5 | OUTPATIENT
Start: 2019-02-19 | End: 2019-08-22 | Stop reason: SDUPTHER

## 2019-02-19 RX ORDER — ASPIRIN 325 MG
325 TABLET ORAL DAILY
Qty: 30 TABLET | Refills: 5 | Status: ON HOLD | OUTPATIENT
Start: 2019-02-19 | End: 2019-06-12 | Stop reason: HOSPADM

## 2019-02-19 RX ORDER — METOPROLOL SUCCINATE 50 MG/1
50 TABLET, EXTENDED RELEASE ORAL NIGHTLY
Qty: 30 TABLET | Refills: 5 | Status: ON HOLD | OUTPATIENT
Start: 2019-02-19 | End: 2019-03-27 | Stop reason: HOSPADM

## 2019-02-19 RX ORDER — LANCETS 30 GAUGE
1 EACH MISCELLANEOUS 2 TIMES DAILY
Qty: 100 EACH | Refills: 5 | OUTPATIENT
Start: 2019-02-19 | End: 2020-09-18 | Stop reason: SDUPTHER

## 2019-02-25 ENCOUNTER — OFFICE VISIT (OUTPATIENT)
Dept: INTERNAL MEDICINE CLINIC | Age: 54
End: 2019-02-25
Payer: COMMERCIAL

## 2019-02-25 VITALS
RESPIRATION RATE: 20 BRPM | SYSTOLIC BLOOD PRESSURE: 173 MMHG | TEMPERATURE: 99 F | HEART RATE: 82 BPM | DIASTOLIC BLOOD PRESSURE: 90 MMHG | OXYGEN SATURATION: 98 %

## 2019-02-25 DIAGNOSIS — F17.200 SMOKING: ICD-10-CM

## 2019-02-25 DIAGNOSIS — L97.522 DIABETIC ULCER OF TOE OF LEFT FOOT ASSOCIATED WITH TYPE 2 DIABETES MELLITUS, WITH FAT LAYER EXPOSED (HCC): ICD-10-CM

## 2019-02-25 DIAGNOSIS — E11.42 TYPE 2 DIABETES MELLITUS WITH DIABETIC POLYNEUROPATHY, WITH LONG-TERM CURRENT USE OF INSULIN (HCC): Primary | ICD-10-CM

## 2019-02-25 DIAGNOSIS — E11.621 DIABETIC ULCER OF TOE OF LEFT FOOT ASSOCIATED WITH TYPE 2 DIABETES MELLITUS, WITH FAT LAYER EXPOSED (HCC): ICD-10-CM

## 2019-02-25 DIAGNOSIS — E11.621 DIABETIC ULCER OF OTHER PART OF RIGHT FOOT ASSOCIATED WITH TYPE 2 DIABETES MELLITUS, LIMITED TO BREAKDOWN OF SKIN (HCC): Primary | ICD-10-CM

## 2019-02-25 DIAGNOSIS — Z79.4 TYPE 2 DIABETES MELLITUS WITH DIABETIC POLYNEUROPATHY, WITH LONG-TERM CURRENT USE OF INSULIN (HCC): Primary | ICD-10-CM

## 2019-02-25 DIAGNOSIS — L97.511 DIABETIC ULCER OF OTHER PART OF RIGHT FOOT ASSOCIATED WITH TYPE 2 DIABETES MELLITUS, LIMITED TO BREAKDOWN OF SKIN (HCC): Primary | ICD-10-CM

## 2019-02-25 PROCEDURE — 99213 OFFICE O/P EST LOW 20 MIN: CPT | Performed by: STUDENT IN AN ORGANIZED HEALTH CARE EDUCATION/TRAINING PROGRAM

## 2019-02-25 PROCEDURE — 99213 OFFICE O/P EST LOW 20 MIN: CPT | Performed by: INTERNAL MEDICINE

## 2019-02-26 LAB
AFB CULTURE (MYCOBACTERIA): NORMAL
AFB SMEAR: NORMAL

## 2019-03-08 ENCOUNTER — OFFICE VISIT (OUTPATIENT)
Dept: INTERNAL MEDICINE CLINIC | Age: 54
End: 2019-03-08
Payer: COMMERCIAL

## 2019-03-08 DIAGNOSIS — L97.511 DIABETIC ULCER OF OTHER PART OF RIGHT FOOT ASSOCIATED WITH TYPE 2 DIABETES MELLITUS, LIMITED TO BREAKDOWN OF SKIN (HCC): ICD-10-CM

## 2019-03-08 DIAGNOSIS — L97.522 DIABETIC ULCER OF TOE OF LEFT FOOT ASSOCIATED WITH TYPE 2 DIABETES MELLITUS, WITH FAT LAYER EXPOSED (HCC): Primary | ICD-10-CM

## 2019-03-08 DIAGNOSIS — E11.621 DIABETIC ULCER OF OTHER PART OF RIGHT FOOT ASSOCIATED WITH TYPE 2 DIABETES MELLITUS, LIMITED TO BREAKDOWN OF SKIN (HCC): ICD-10-CM

## 2019-03-08 DIAGNOSIS — E11.621 DIABETIC ULCER OF TOE OF LEFT FOOT ASSOCIATED WITH TYPE 2 DIABETES MELLITUS, WITH FAT LAYER EXPOSED (HCC): Primary | ICD-10-CM

## 2019-03-08 PROCEDURE — 11721 DEBRIDE NAIL 6 OR MORE: CPT

## 2019-03-08 PROCEDURE — 99213 OFFICE O/P EST LOW 20 MIN: CPT | Performed by: STUDENT IN AN ORGANIZED HEALTH CARE EDUCATION/TRAINING PROGRAM

## 2019-03-08 PROCEDURE — 11042 DBRDMT SUBQ TIS 1ST 20SQCM/<: CPT

## 2019-03-22 ENCOUNTER — OFFICE VISIT (OUTPATIENT)
Dept: INTERNAL MEDICINE CLINIC | Age: 54
DRG: 074 | End: 2019-03-22
Payer: COMMERCIAL

## 2019-03-22 DIAGNOSIS — E11.621 DIABETIC ULCER OF OTHER PART OF RIGHT FOOT ASSOCIATED WITH TYPE 2 DIABETES MELLITUS, LIMITED TO BREAKDOWN OF SKIN (HCC): Primary | ICD-10-CM

## 2019-03-22 DIAGNOSIS — L97.522 DIABETIC ULCER OF TOE OF LEFT FOOT ASSOCIATED WITH TYPE 2 DIABETES MELLITUS, WITH FAT LAYER EXPOSED (HCC): ICD-10-CM

## 2019-03-22 DIAGNOSIS — E11.621 DIABETIC ULCER OF TOE OF LEFT FOOT ASSOCIATED WITH TYPE 2 DIABETES MELLITUS, WITH FAT LAYER EXPOSED (HCC): ICD-10-CM

## 2019-03-22 DIAGNOSIS — L97.511 DIABETIC ULCER OF OTHER PART OF RIGHT FOOT ASSOCIATED WITH TYPE 2 DIABETES MELLITUS, LIMITED TO BREAKDOWN OF SKIN (HCC): Primary | ICD-10-CM

## 2019-03-22 PROCEDURE — 99213 OFFICE O/P EST LOW 20 MIN: CPT | Performed by: STUDENT IN AN ORGANIZED HEALTH CARE EDUCATION/TRAINING PROGRAM

## 2019-03-22 PROCEDURE — 0HBNXZZ EXCISION OF LEFT FOOT SKIN, EXTERNAL APPROACH: ICD-10-PCS | Performed by: STUDENT IN AN ORGANIZED HEALTH CARE EDUCATION/TRAINING PROGRAM

## 2019-03-22 PROCEDURE — 29405 APPL SHORT LEG CAST: CPT

## 2019-03-25 ENCOUNTER — OFFICE VISIT (OUTPATIENT)
Dept: INTERNAL MEDICINE CLINIC | Age: 54
DRG: 074 | End: 2019-03-25
Payer: COMMERCIAL

## 2019-03-25 ENCOUNTER — APPOINTMENT (OUTPATIENT)
Dept: VASCULAR LAB | Age: 54
DRG: 074 | End: 2019-03-25
Payer: COMMERCIAL

## 2019-03-25 ENCOUNTER — APPOINTMENT (OUTPATIENT)
Dept: GENERAL RADIOLOGY | Age: 54
DRG: 074 | End: 2019-03-25
Payer: COMMERCIAL

## 2019-03-25 ENCOUNTER — HOSPITAL ENCOUNTER (INPATIENT)
Age: 54
LOS: 3 days | Discharge: HOME | DRG: 074 | End: 2019-03-28
Attending: EMERGENCY MEDICINE | Admitting: STUDENT IN AN ORGANIZED HEALTH CARE EDUCATION/TRAINING PROGRAM
Payer: COMMERCIAL

## 2019-03-25 VITALS
TEMPERATURE: 98.8 F | OXYGEN SATURATION: 91 % | DIASTOLIC BLOOD PRESSURE: 81 MMHG | HEART RATE: 107 BPM | SYSTOLIC BLOOD PRESSURE: 146 MMHG | RESPIRATION RATE: 20 BRPM

## 2019-03-25 DIAGNOSIS — G89.29 CHRONIC LOW BACK PAIN WITHOUT SCIATICA, UNSPECIFIED BACK PAIN LATERALITY: ICD-10-CM

## 2019-03-25 DIAGNOSIS — M79.604 RIGHT LEG PAIN: Primary | ICD-10-CM

## 2019-03-25 DIAGNOSIS — I82.491 ACUTE DEEP VEIN THROMBOSIS (DVT) OF OTHER SPECIFIED VEIN OF RIGHT LOWER EXTREMITY (HCC): ICD-10-CM

## 2019-03-25 DIAGNOSIS — E11.42 TYPE 2 DIABETES MELLITUS WITH DIABETIC POLYNEUROPATHY, WITH LONG-TERM CURRENT USE OF INSULIN (HCC): ICD-10-CM

## 2019-03-25 DIAGNOSIS — L03.115 CELLULITIS OF RIGHT LOWER LEG: Primary | ICD-10-CM

## 2019-03-25 DIAGNOSIS — Z79.4 TYPE 2 DIABETES MELLITUS WITH DIABETIC POLYNEUROPATHY, WITH LONG-TERM CURRENT USE OF INSULIN (HCC): ICD-10-CM

## 2019-03-25 DIAGNOSIS — Z51.89 VISIT FOR WOUND CHECK: Primary | ICD-10-CM

## 2019-03-25 DIAGNOSIS — M54.50 CHRONIC LOW BACK PAIN WITHOUT SCIATICA, UNSPECIFIED BACK PAIN LATERALITY: ICD-10-CM

## 2019-03-25 DIAGNOSIS — F17.200 SMOKING: ICD-10-CM

## 2019-03-25 PROBLEM — L02.619 CELLULITIS AND ABSCESS OF FOOT: Status: ACTIVE | Noted: 2019-03-25

## 2019-03-25 PROBLEM — L03.119 CELLULITIS AND ABSCESS OF FOOT: Status: ACTIVE | Noted: 2019-03-25

## 2019-03-25 LAB
ANION GAP SERPL CALCULATED.3IONS-SCNC: 10 MMOL/L (ref 3–16)
BASOPHILS ABSOLUTE: 0 K/UL (ref 0–0.2)
BASOPHILS RELATIVE PERCENT: 0.4 %
BUN BLDV-MCNC: 17 MG/DL (ref 7–20)
C-REACTIVE PROTEIN: 94.5 MG/L (ref 0–5.1)
CALCIUM SERPL-MCNC: 9.2 MG/DL (ref 8.3–10.6)
CHLORIDE BLD-SCNC: 93 MMOL/L (ref 99–110)
CO2: 30 MMOL/L (ref 21–32)
CREAT SERPL-MCNC: 1.5 MG/DL (ref 0.6–1.1)
EOSINOPHILS ABSOLUTE: 0.3 K/UL (ref 0–0.6)
EOSINOPHILS RELATIVE PERCENT: 2.9 %
GFR AFRICAN AMERICAN: 44
GFR NON-AFRICAN AMERICAN: 36
GLUCOSE BLD-MCNC: 309 MG/DL (ref 70–99)
GLUCOSE BLD-MCNC: 384 MG/DL (ref 70–99)
GLUCOSE BLD-MCNC: 420 MG/DL (ref 70–99)
GLUCOSE BLD-MCNC: 477 MG/DL (ref 70–99)
HCT VFR BLD CALC: 31.6 % (ref 36–48)
HEMOGLOBIN: 10.3 G/DL (ref 12–16)
LYMPHOCYTES ABSOLUTE: 1.5 K/UL (ref 1–5.1)
LYMPHOCYTES RELATIVE PERCENT: 15.1 %
MCH RBC QN AUTO: 28.6 PG (ref 26–34)
MCHC RBC AUTO-ENTMCNC: 32.6 G/DL (ref 31–36)
MCV RBC AUTO: 87.7 FL (ref 80–100)
MONOCYTES ABSOLUTE: 0.6 K/UL (ref 0–1.3)
MONOCYTES RELATIVE PERCENT: 6.1 %
NEUTROPHILS ABSOLUTE: 7.3 K/UL (ref 1.7–7.7)
NEUTROPHILS RELATIVE PERCENT: 75.5 %
PDW BLD-RTO: 16.2 % (ref 12.4–15.4)
PERFORMED ON: ABNORMAL
PLATELET # BLD: 253 K/UL (ref 135–450)
PMV BLD AUTO: 9.1 FL (ref 5–10.5)
POTASSIUM SERPL-SCNC: 4.4 MMOL/L (ref 3.5–5.1)
RBC # BLD: 3.6 M/UL (ref 4–5.2)
SEDIMENTATION RATE, ERYTHROCYTE: 94 MM/HR (ref 0–30)
SODIUM BLD-SCNC: 133 MMOL/L (ref 136–145)
WBC # BLD: 9.7 K/UL (ref 4–11)

## 2019-03-25 PROCEDURE — 73620 X-RAY EXAM OF FOOT: CPT

## 2019-03-25 PROCEDURE — 87186 SC STD MICRODIL/AGAR DIL: CPT

## 2019-03-25 PROCEDURE — 94150 VITAL CAPACITY TEST: CPT

## 2019-03-25 PROCEDURE — 99213 OFFICE O/P EST LOW 20 MIN: CPT | Performed by: INTERNAL MEDICINE

## 2019-03-25 PROCEDURE — 99213 OFFICE O/P EST LOW 20 MIN: CPT | Performed by: STUDENT IN AN ORGANIZED HEALTH CARE EDUCATION/TRAINING PROGRAM

## 2019-03-25 PROCEDURE — 2580000003 HC RX 258: Performed by: EMERGENCY MEDICINE

## 2019-03-25 PROCEDURE — 94664 DEMO&/EVAL PT USE INHALER: CPT

## 2019-03-25 PROCEDURE — 96372 THER/PROPH/DIAG INJ SC/IM: CPT

## 2019-03-25 PROCEDURE — 6370000000 HC RX 637 (ALT 250 FOR IP): Performed by: STUDENT IN AN ORGANIZED HEALTH CARE EDUCATION/TRAINING PROGRAM

## 2019-03-25 PROCEDURE — 96367 TX/PROPH/DG ADDL SEQ IV INF: CPT

## 2019-03-25 PROCEDURE — 87070 CULTURE OTHR SPECIMN AEROBIC: CPT

## 2019-03-25 PROCEDURE — 87040 BLOOD CULTURE FOR BACTERIA: CPT

## 2019-03-25 PROCEDURE — 1200000000 HC SEMI PRIVATE

## 2019-03-25 PROCEDURE — 96365 THER/PROPH/DIAG IV INF INIT: CPT

## 2019-03-25 PROCEDURE — 87205 SMEAR GRAM STAIN: CPT

## 2019-03-25 PROCEDURE — 80048 BASIC METABOLIC PNL TOTAL CA: CPT

## 2019-03-25 PROCEDURE — 96366 THER/PROPH/DIAG IV INF ADDON: CPT

## 2019-03-25 PROCEDURE — 2580000003 HC RX 258: Performed by: PHYSICIAN ASSISTANT

## 2019-03-25 PROCEDURE — 36415 COLL VENOUS BLD VENIPUNCTURE: CPT

## 2019-03-25 PROCEDURE — 99285 EMERGENCY DEPT VISIT HI MDM: CPT

## 2019-03-25 PROCEDURE — 2580000003 HC RX 258: Performed by: STUDENT IN AN ORGANIZED HEALTH CARE EDUCATION/TRAINING PROGRAM

## 2019-03-25 PROCEDURE — 6360000002 HC RX W HCPCS: Performed by: FAMILY MEDICINE

## 2019-03-25 PROCEDURE — 85652 RBC SED RATE AUTOMATED: CPT

## 2019-03-25 PROCEDURE — 85025 COMPLETE CBC W/AUTO DIFF WBC: CPT

## 2019-03-25 PROCEDURE — 6360000002 HC RX W HCPCS: Performed by: PHYSICIAN ASSISTANT

## 2019-03-25 PROCEDURE — 6360000002 HC RX W HCPCS: Performed by: EMERGENCY MEDICINE

## 2019-03-25 PROCEDURE — 87077 CULTURE AEROBIC IDENTIFY: CPT

## 2019-03-25 PROCEDURE — 93971 EXTREMITY STUDY: CPT

## 2019-03-25 PROCEDURE — 86140 C-REACTIVE PROTEIN: CPT

## 2019-03-25 PROCEDURE — 6360000002 HC RX W HCPCS: Performed by: STUDENT IN AN ORGANIZED HEALTH CARE EDUCATION/TRAINING PROGRAM

## 2019-03-25 RX ORDER — FUROSEMIDE 40 MG/1
40 TABLET ORAL 2 TIMES DAILY
Status: DISCONTINUED | OUTPATIENT
Start: 2019-03-25 | End: 2019-03-28 | Stop reason: HOSPADM

## 2019-03-25 RX ORDER — DEXTROSE MONOHYDRATE 50 MG/ML
100 INJECTION, SOLUTION INTRAVENOUS PRN
Status: DISCONTINUED | OUTPATIENT
Start: 2019-03-25 | End: 2019-03-28 | Stop reason: HOSPADM

## 2019-03-25 RX ORDER — ONDANSETRON 2 MG/ML
4 INJECTION INTRAMUSCULAR; INTRAVENOUS EVERY 6 HOURS PRN
Status: DISCONTINUED | OUTPATIENT
Start: 2019-03-25 | End: 2019-03-28 | Stop reason: HOSPADM

## 2019-03-25 RX ORDER — ATORVASTATIN CALCIUM 20 MG/1
20 TABLET, FILM COATED ORAL DAILY
Status: DISCONTINUED | OUTPATIENT
Start: 2019-03-26 | End: 2019-03-28 | Stop reason: HOSPADM

## 2019-03-25 RX ORDER — PANTOPRAZOLE SODIUM 40 MG/1
40 TABLET, DELAYED RELEASE ORAL
Status: DISCONTINUED | OUTPATIENT
Start: 2019-03-26 | End: 2019-03-28 | Stop reason: HOSPADM

## 2019-03-25 RX ORDER — GABAPENTIN 400 MG/1
800 CAPSULE ORAL 4 TIMES DAILY
Status: DISCONTINUED | OUTPATIENT
Start: 2019-03-25 | End: 2019-03-28 | Stop reason: HOSPADM

## 2019-03-25 RX ORDER — AMITRIPTYLINE HYDROCHLORIDE 50 MG/1
100 TABLET, FILM COATED ORAL NIGHTLY
Status: DISCONTINUED | OUTPATIENT
Start: 2019-03-25 | End: 2019-03-28 | Stop reason: HOSPADM

## 2019-03-25 RX ORDER — ALBUTEROL SULFATE 2.5 MG/3ML
2.5 SOLUTION RESPIRATORY (INHALATION) 4 TIMES DAILY
Status: DISCONTINUED | OUTPATIENT
Start: 2019-03-25 | End: 2019-03-25

## 2019-03-25 RX ORDER — METHADONE HYDROCHLORIDE 10 MG/1
140 TABLET ORAL DAILY
Status: DISCONTINUED | OUTPATIENT
Start: 2019-03-26 | End: 2019-03-28 | Stop reason: HOSPADM

## 2019-03-25 RX ORDER — SODIUM CHLORIDE 0.9 % (FLUSH) 0.9 %
10 SYRINGE (ML) INJECTION PRN
Status: DISCONTINUED | OUTPATIENT
Start: 2019-03-25 | End: 2019-03-28 | Stop reason: HOSPADM

## 2019-03-25 RX ORDER — METOPROLOL SUCCINATE 50 MG/1
50 TABLET, EXTENDED RELEASE ORAL NIGHTLY
Status: DISCONTINUED | OUTPATIENT
Start: 2019-03-25 | End: 2019-03-28 | Stop reason: HOSPADM

## 2019-03-25 RX ORDER — ALBUTEROL SULFATE 2.5 MG/3ML
2.5 SOLUTION RESPIRATORY (INHALATION) EVERY 6 HOURS PRN
Status: DISCONTINUED | OUTPATIENT
Start: 2019-03-25 | End: 2019-03-28 | Stop reason: HOSPADM

## 2019-03-25 RX ORDER — ASPIRIN 325 MG
325 TABLET ORAL DAILY
Status: DISCONTINUED | OUTPATIENT
Start: 2019-03-25 | End: 2019-03-25

## 2019-03-25 RX ORDER — SODIUM CHLORIDE 0.9 % (FLUSH) 0.9 %
10 SYRINGE (ML) INJECTION EVERY 12 HOURS SCHEDULED
Status: DISCONTINUED | OUTPATIENT
Start: 2019-03-25 | End: 2019-03-28 | Stop reason: HOSPADM

## 2019-03-25 RX ORDER — NICOTINE 21 MG/24HR
1 PATCH, TRANSDERMAL 24 HOURS TRANSDERMAL EVERY 24 HOURS
Status: DISCONTINUED | OUTPATIENT
Start: 2019-03-25 | End: 2019-03-28 | Stop reason: HOSPADM

## 2019-03-25 RX ORDER — DEXTROSE MONOHYDRATE 25 G/50ML
12.5 INJECTION, SOLUTION INTRAVENOUS PRN
Status: DISCONTINUED | OUTPATIENT
Start: 2019-03-25 | End: 2019-03-28 | Stop reason: HOSPADM

## 2019-03-25 RX ORDER — NICOTINE POLACRILEX 4 MG
15 LOZENGE BUCCAL PRN
Status: DISCONTINUED | OUTPATIENT
Start: 2019-03-25 | End: 2019-03-28 | Stop reason: HOSPADM

## 2019-03-25 RX ADMIN — Medication 10 ML: at 22:41

## 2019-03-25 RX ADMIN — PIPERACILLIN SODIUM,TAZOBACTAM SODIUM 3.38 G: 3; .375 INJECTION, POWDER, FOR SOLUTION INTRAVENOUS at 22:38

## 2019-03-25 RX ADMIN — GABAPENTIN 800 MG: 400 CAPSULE ORAL at 22:37

## 2019-03-25 RX ADMIN — VANCOMYCIN HYDROCHLORIDE 1250 MG: 10 INJECTION, POWDER, LYOPHILIZED, FOR SOLUTION INTRAVENOUS at 13:40

## 2019-03-25 RX ADMIN — PIPERACILLIN SODIUM,TAZOBACTAM SODIUM 3.38 G: 3; .375 INJECTION, POWDER, FOR SOLUTION INTRAVENOUS at 12:31

## 2019-03-25 RX ADMIN — AMITRIPTYLINE HYDROCHLORIDE 100 MG: 50 TABLET, FILM COATED ORAL at 22:37

## 2019-03-25 RX ADMIN — INSULIN HUMAN 5 UNITS: 100 INJECTION, SOLUTION PARENTERAL at 18:08

## 2019-03-25 RX ADMIN — ENOXAPARIN SODIUM 40 MG: 40 INJECTION SUBCUTANEOUS at 23:42

## 2019-03-25 RX ADMIN — INSULIN LISPRO 9 UNITS: 100 INJECTION, SOLUTION INTRAVENOUS; SUBCUTANEOUS at 22:25

## 2019-03-25 RX ADMIN — ENOXAPARIN SODIUM 40 MG: 40 INJECTION SUBCUTANEOUS at 22:41

## 2019-03-25 RX ADMIN — FUROSEMIDE 40 MG: 40 TABLET ORAL at 22:37

## 2019-03-25 RX ADMIN — INSULIN GLARGINE 30 UNITS: 100 INJECTION, SOLUTION SUBCUTANEOUS at 22:24

## 2019-03-25 RX ADMIN — METOPROLOL SUCCINATE 50 MG: 50 TABLET, EXTENDED RELEASE ORAL at 22:37

## 2019-03-25 ASSESSMENT — ENCOUNTER SYMPTOMS
PHOTOPHOBIA: 0
CONSTIPATION: 1
WHEEZING: 0
ABDOMINAL PAIN: 0
EYES NEGATIVE: 1
DIARRHEA: 0
BACK PAIN: 0
COUGH: 0
EYE PAIN: 0
CHEST TIGHTNESS: 1
NAUSEA: 0
VOMITING: 0
SHORTNESS OF BREATH: 0

## 2019-03-25 ASSESSMENT — PAIN DESCRIPTION - PAIN TYPE: TYPE: ACUTE PAIN

## 2019-03-25 ASSESSMENT — PAIN DESCRIPTION - DESCRIPTORS: DESCRIPTORS: ACHING;THROBBING

## 2019-03-25 ASSESSMENT — PAIN DESCRIPTION - FREQUENCY: FREQUENCY: INTERMITTENT

## 2019-03-25 ASSESSMENT — PAIN DESCRIPTION - ORIENTATION: ORIENTATION: RIGHT

## 2019-03-25 ASSESSMENT — PAIN DESCRIPTION - LOCATION: LOCATION: FOOT;LEG

## 2019-03-25 NOTE — ED PROVIDER NOTES
ED Attending Attestation Note     Date of evaluation: 3/25/2019    This patient was seen by the advance practice provider. I have seen and examined the patient, agree with the workup, evaluation, management and diagnosis. The care plan has been discussed. My assessment reveals a somewhat chronically ill-appearing patient, older appearing than her stated age, in no acute distress. She presents from the podiatry clinic, by whom she is being managed for a right foot ulcer, with concern for increasing cellulitis, with requests for an ultrasound to evaluate for DVT, and reported plans for admission for management of cellulitis if this is negative. She has a cast in place on the left lower extremity, which is baseline. She has a significant dressing in place over the right foot and lower leg. An ulcerated wound is noted to be present on the distal lateral plantar aspect of the foot, and warmth, erythema, and edema of the lower leg is noted above the level of the splint.          Aminah Gan MD  03/25/19 [id number]

## 2019-03-25 NOTE — H&P
Internal Medicine PGY- 1 Resident History & Physical      PCP: Alan Cnaada MD    Date of Admission: 3/25/2019    Chief Complaint:  R leg pain    History Of Present Illness:     Pt is a 47yo F with a PMHx of Asthma, T2DM, DVT (3/2004), HLD, HTN, Pancreatitis, MRSA-infected leg wound (11/2017), DDD, extensive hx of cellulitis, and diabetic foot ulcers who presents with concern for R leg pain. Pt has been seeing Dr. Cr Monte and Podiatry in clinics closely, following up for her BL LE diabetic foot ulcers. Pt last saw Podiatry this past Friday where she says that they agreed her R foot was healing appropriately, and so her cast was moved to the left foot. Pt reports walking around \"a little too much\" on her R foot without proper \"protection\" and believes this is what is causing her current pain. She reports the R leg (below the knee) has been swollen, red, warm, and tender since Saturday and has only progressed since then. The patient denies fevers, chills, chest pain, shortness of breath, nausea, vomiting, diarrhea, or constipation. ED Course:  Pt arrived to ED afebrile, normotensive, HR 80, required 2L NC to sat 95. Initial glucose was found to be 477, subsequent testing showed it lowering to the 370's. Creatine is elevated at 1.5. Pt received Vancomycin 1250mg IV once and Zosyn 3.375g IV once. R ankle x-ray showed only soft tissue swelling. R leg doppler showed DVT acutely totally occluding gastrocnemius vein; no acute changes seen in left leg.       Past Medical History:          Diagnosis Date    Amenorrhea     Anomalies of nails     Asthma 5/14/04    Athlete's foot 8/2010    Bacterial vaginosis 04/2008    Carpal tunnel syndrome 5/2007    [facility]     Diabetes mellitus type II 08/2007    Diabetic neuropathy ([facility] 8/10    DVT (deep venous thrombosis) ([facility] 3/2004    Dyslipidemia 5/2009    Dyspareunia 05/2009    ETOH abuse 3/04/2007    Feet clawing     HTN (hypertension)     Hx of blood clots     Hyperlipidemia     MRSA (methicillin resistant staph aureus) culture positive 2017; 2017    foot; leg     Neuropathy 2009    polyneuropathy    Pain, back 2008    Pain, eye, right 04    Pancreatitis 04    Pseudocyst, pancreas 04    Scalp lesion 2007    Tinea pedis     Tobacco abuse 2008    Vaginal bleeding, abnormal 2007       Past Surgical History:          Procedure Laterality Date     SECTION  unknown    FOOT DEBRIDEMENT Right 2019    INCISION AND DRAINAGE WITH APPLICATION OF STRAVIX GRAFT RIGHT FOOT performed by Eduardo Allen DPM at [address] [place] [facility] [place]    I & D left foot    OTHER SURGICAL HISTORY Right 10/20/2017    RIGHT GASTROC LENGTHENING ENDOSCOPIC, INJECTION OF AMNI GRAFT    OTHER SURGICAL HISTORY Right 2018    Diabetic foot ulcer I&D w/ integra graft application    VA DEBRIDEMENT, SKIN, SUB-Q TISSUE,MUSCLE,BONE,=<20 SQ CM Right 2018    RIGHT FOOT DEBRIDEMENT INCISION AND DRAINAGE, PARTIAL 5TH RAY AMPUTATION performed by Eduardo Allen DPM at [address] [facility] [address] [facility] [address] [facility]  9/2895    cryotherapy done on lesion    TOE AMPUTATION Left 2017    AMPUTATION LEFT GREAT TOE                    Medications Prior to Admission:      Prior to Admission medications    Medication Sig Start Date End Date Taking?  Authorizing Provider   aspirin 325 MG tablet Take 1 tablet by mouth daily 19   Marissa Ascencio MD   albuterol sulfate  (90 Base) MCG/ACT inhaler Inhale 2 puffs into the lungs every 6 hours as needed for Wheezing 19   Marissa Ascencio MD   metoprolol succinate (TOPROL XL) 50 MG extended release tablet Take 1 tablet by mouth nightly 19   Marissa Ascencio MD   omeprazole (PRILOSEC) 20 MG delayed release capsule Take 1 capsule by mouth Daily 19   Marissa Ascencio MD   atorvastatin (LIPITOR) 20 MG tablet Take 1 tablet by mouth daily 2/19/19   Yun Garcia MD   Lancets MISC 1 each by Does not apply route 2 times daily PHARMACY MAY SUBSTITUTE TO TRUE METRIX LANCETS 2/19/19   Yun Garcia MD   Insulin Pen Needle 31G X 5 MM MISC 1 each by Does not apply route daily 2/19/19   Yun Garcia MD   insulin glargine [facility]) 100 UNIT/ML injection pen Inject 30 Units into the skin nightly 2/18/19   Yun Garcia MD   insulin glargine [facility]) 100 UNIT/ML injection pen Inject 20 Units into the skin every morning (before breakfast)  Patient taking differently: Inject 30 Units into the skin every morning (before breakfast)  2/18/19   Yun Garcia MD   amitriptyline (ELAVIL) 100 MG tablet Take 1 tablet by mouth nightly 2/11/19   Yun Garcia MD   furosemide (LASIX) 40 MG tablet Take 1 tablet by mouth 2 times daily 2/11/19   Yun Garcia MD   gabapentin (NEURONTIN) 800 MG tablet Take 1 tablet by mouth 4 times daily for 90 days. . 2/11/19 5/12/19  Yun Garcia MD   methadone (DOLOPHINE) 10 MG tablet Take 140 mg by mouth daily. Savage Srinivasan Historical Provider, MD   blood glucose test strips (TRUE METRIX BLOOD GLUCOSE TEST) strip 1 each by In Vitro route 2 times daily As needed. 7/23/18   Yun Garcia MD   nicotine (NICODERM CQ) 14 MG/24HR Place 1 patch onto the skin every 24 hours 6/5/18   Crystal WadenaRAMONE wasserman   Gauze Pads & Dressings MISC Please dispense 4x8 guaze, kerlix, and ace 2/23/18   David Nicholas DPM   ammonium lactate (LAC-HYDRIN) 12 % lotion Apply topically daily. Patient taking differently: as needed Apply topically daily.  1/29/18   David Nicholas DPM   Accu-Chek Softclix Lancets MISC 1 strip by Does not apply route 2 times daily Check before breakfast and before going to bed 4/20/17   Yun Garcia MD       Allergies:  Sulfa antibiotics    Social History:      The patient currently lives at home with daughter    TOBACCO:   reports that she has been smoking cigarettes. She has a 30.00 pack-year smoking history. She has never used smokeless tobacco.  ETOH:  reports that she does not drink alcohol. Family History:     History reviewed. No pertinent family history. REVIEW OF SYSTEMS: Pertinent positives as noted in the HPI. All other systems reviewed and negative. ROS: Review of Systems   Constitutional: Negative. HENT: Negative. Eyes: Negative. Respiratory: Positive for chest tightness. Negative for cough, shortness of breath and wheezing. Cardiovascular: Positive for leg swelling. Negative for chest pain and palpitations. Gastrointestinal: Positive for constipation. Negative for abdominal pain, diarrhea, nausea and vomiting. Genitourinary: Negative. PHYSICAL EXAM PERFORMED:    BP (!) 132/40   Pulse 85   Temp 98.2 °F (36.8 °C) (Oral)   Resp 16   Ht 5' 2\" (1.575 m)   Wt 188 lb (85.3 kg)   SpO2 92%   BMI 34.39 kg/m²     General appearance:  Lethargic. No apparent distress, appears stated age and cooperative. HEENT:  Normal cephalic,atraumatic without obvious deformity. Pupils equal, round, and reactive to light. Extra ocular muscles intact. Conjunctivae/corneas clear. Neck: Supple, with full range of motion. No jugular venous distention. Trachea midline. Respiratory:  Normal respiratory effort. Bibasilar crackles. Cardiovascular:  Regular rate and rhythm with normal S1/S2 without murmurs, rubs or gallops. Abdomen: Soft, non-tender, non-distended with normal bowel sounds. Musculoskeletal:  No clubbing, cyanosis bilaterally. +1 non-pitting edema at RLE (below patella); tender to palpation. Full range of motion without deformity. Skin: Skin color, texture, turgor normal. Hyperpigmentation of RLE extending from knee down to foot. Pt has cast on LLE which obstructed exam. RLE was wrapped in ACE. Neurologic:  Neurovascularly intact without any focal sensory/motor deficits.  Cranialnerves: II-XII intact, grossly non-focal.  Psychiatric:  Alert and oriented, thought content appropriate,normal insight  Capillary Refill: Brisk,< 3 seconds   Peripheral Pulses: +2 palpable, equal bilaterally       Labs:     Recent Labs     03/25/19  1210   WBC 9.7   HGB 10.3*   HCT 31.6*        Recent Labs     03/25/19  1210   *   K 4.4   CL 93*   CO2 30   BUN 17   CREATININE 1.5*   CALCIUM 9.2     No results for input(s): AST, ALT, BILIDIR, BILITOT, ALKPHOS in the last 72 hours. No results for input(s): INR in the last 72 hours. No results for input(s): [place] in the last 72 hours. Urinalysis:      Lab Results   Component Value Date    NITRU Negative 03/26/2018    WBCUA 0-2 03/26/2018    BACTERIA Rare 03/26/2018    RBCUA 3-5 03/26/2018    BLOODU TRACE-LYSED 03/26/2018    SPECGRAV 1.015 03/26/2018    GLUCOSEU >=1000 03/26/2018       Radiology:     VL Extremity Venous Right   Final Result      XR FOOT RIGHT (2 VIEWS)   Final Result      Indeterminate soft tissue swelling. Previous amputation of the fifth digit. No acute fracture. If concern for acute injury splinting with follow-up recommended      If concern for osteomyelitis triphasic bone scan or MRI may be useful. ASSESSMENT & PLAN:  Lashonda Beard is a 52 y.o. female w/ a PMHx of Asthma, T2DM, DVT (3/2004), HLD, HTN, Pancreatitis, MRSA-infected leg wound (11/2017), DDD, extensive hx of cellulitis, and diabetic foot ulcers who presents with concern for R leg pain. Active Hospital Problems    Diagnosis Date Noted    Cellulitis and abscess of foot [L03.119, L02.619] 03/25/2019    Cellulitis [L03.90] 03/27/2018     RLE Cellulitis  - Pt has acute edema, tenderness, erythema, and hyperpigmentation of RLE since past Saturday, combined with hx of T2DM and diabetic foot ulcers, suspicion of cellulitis is high.  - Received Vancomycin and Zosyn in ED  - R ankle X-ray: Soft tissue swelling, no fracture  - Podiatry consulted, appreciate rec's.   - Continue Vancomycin + Zosyn    DVT (R gastrocnemius)  - Pt has hx of DVTs  - Doppler showed acute total occlusion of R gastrocnemius V.  - Heparin 5000U SubQ TID    Hx of T2DM   - Glucose 477 on admission  - Insulin sliding scale  - Hypoglycemic protocols in place    Hx of Asthma  - Currently on 2L NC  - Resume home inhaler    Hx of HTN  - BP normotensive on admission  - Resume home Metoprolol Succinate 50mg PO qHs  - Resume home Lasix 40mg PO BID    Hx of DDD  - Resume home Amitriptyline 100mg PO qHs for pain control  - Resume home Gabapentin 800mg PO 4x/day    Hx of HLD  - Resume home Lipitor 20mg PO qD    Smoking Hx  - Nicotine patch    Discontinued aspirin 325, does not meet criteria for receiving this.      DVT Prophylaxis: Heparin subQ  Diet: DIET CARB CONTROL;  Code Status: Full Code      Dispo - [facility]       Humberto Tyson MD    I will discuss the patient with the senior resident and Leia Infante MD

## 2019-03-25 NOTE — ED PROVIDER NOTES
[address] [facility]          PHYSICIAN ASSISTANT NOTE       Date of evaluation: 3/25/2019    Chief Complaint     Leg Pain (right, r/o blood clot)      History of Present Illness     Ritesh Pacheco is a 52 y.o. female with a past medical history of asthma, COPD, type II diabetes, diabetic neuropathy, DVT, hyperlipidemia who presents with right lower extremity pain and swelling. Patient was seen in the podiatry clinic this morning for a follow-up of her right foot. She was placed and bilateral cast and walking boots due to diabetic ulcers. She had the cast and boot from her right foot removed 3 days ago in clinic. At that time, the patient did not have any wounds, swelling, or erythema. Upon reevaluation in the podiatry clinic this morning the patient did have a wound noted to the plantar aspect of her right foot. The wound was excised and debrided in the clinic and the patient was sent down to the emergency department for further evaluation. Upon review of the podiatry note, the concern for possible infection versus DVT. The patient states that the pain and swelling to her right lower extremity have worsened over the last 2 days. She states the pain is from her knee down. She denies any fevers, abdominal pain, nausea, vomiting, chest pain, shortness of breath, headaches, or any other symptoms. The patient's daughter states that she has not been wearing a walking boot she was instructed to do. Review of Systems     Review of Systems   Constitutional: Negative for chills and fever. HENT: Negative for congestion. Eyes: Negative for photophobia and pain. Respiratory: Negative for cough and shortness of breath. Cardiovascular: Negative for chest pain and palpitations. Gastrointestinal: Negative for abdominal pain, diarrhea, nausea and vomiting. Genitourinary: Negative for difficulty urinating and hematuria. Musculoskeletal: Negative for back pain and neck pain. Right lower extremity pain and swelling   Neurological: Negative for dizziness and headaches. Psychiatric/Behavioral: Negative for suicidal ideas. Past Medical, Surgical, Family, and Social History     She has a past medical history of Amenorrhea, Anomalies of nails, Asthma, Athlete's foot, Bacterial vaginosis, Carpal tunnel syndrome, COPD (chronic obstructive pulmonary disease) ([facility] [address] Diabetes mellitus type II, Diabetic neuropathy ([facility] [address] DVT (deep venous thrombosis) ([facility] [address] Dyslipidemia, Dyspareunia, ETOH abuse, Feet clawing, HTN (hypertension), Hx of blood clots, Hyperlipidemia, MRSA (methicillin resistant staph aureus) culture positive, Neuropathy, Pain, back, Pain, eye, right, Pancreatitis, Pseudocyst, pancreas, Scalp lesion, Tinea pedis, Tobacco abuse, and Vaginal bleeding, abnormal.  She has a past surgical history that includes  section (unknown); pre-malignant / benign skin lesion excision (7003); other surgical history (Left, 2016); Toe amputation (Left, 2017); other surgical history (Right, 10/20/2017); other surgical history (Right, 2018); pr debridement, skin, sub-q tissue,muscle,bone,=<20 sq cm (Right, 2018); and Foot Debridement (Right, 2019). Her family history is not on file. She reports that she has been smoking cigarettes. She has a 30.00 pack-year smoking history. She has never used smokeless tobacco. She reports that she does not drink alcohol or use drugs. Medications     Previous Medications    ACCU-CHEK SOFTCLIX LANCETS MISC    1 strip by Does not apply route 2 times daily Check before breakfast and before going to bed    ALBUTEROL SULFATE  (90 BASE) MCG/ACT INHALER    Inhale 2 puffs into the lungs every 6 hours as needed for Wheezing    AMITRIPTYLINE (ELAVIL) 100 MG TABLET    Take 1 tablet by mouth nightly    AMMONIUM LACTATE (LAC-HYDRIN) 12 % LOTION    Apply topically daily.     ASPIRIN 325 MG TABLET    Take 1 tablet by mouth daily Right lower leg: She exhibits tenderness and swelling. Right foot: There is tenderness and swelling. Warmth and errythema, please see podiatry earlier stay for images   Neurological: She is alert and oriented to person, place, and time. Skin: Skin is warm and dry. Psychiatric: She has a normal mood and affect. Diagnostic Results     RADIOLOGY:  VL Extremity Venous Right   Final Result      XR FOOT RIGHT (2 VIEWS)   Final Result      Indeterminate soft tissue swelling. Previous amputation of the fifth digit. No acute fracture. If concern for acute injury splinting with follow-up recommended      If concern for osteomyelitis triphasic bone scan or MRI may be useful.           LABS:   Results for orders placed or performed during the hospital encounter of 03/25/19   CBC auto differential   Result Value Ref Range    WBC 9.7 4.0 - 11.0 K/uL    RBC 3.60 (L) 4.00 - 5.20 M/uL    Hemoglobin 10.3 (L) 12.0 - 16.0 g/dL    Hematocrit 31.6 (L) 36.0 - 48.0 %    MCV 87.7 80.0 - 100.0 fL    MCH 28.6 26.0 - 34.0 pg    MCHC 32.6 31.0 - 36.0 g/dL    RDW 16.2 (H) 12.4 - 15.4 %    Platelets 418 574 - 689 K/uL    MPV 9.1 5.0 - 10.5 fL    Neutrophils % 75.5 %    Lymphocytes % 15.1 %    Monocytes % 6.1 %    Eosinophils % 2.9 %    Basophils % 0.4 %    Neutrophils # 7.3 1.7 - 7.7 K/uL    Lymphocytes # 1.5 1.0 - 5.1 K/uL    Monocytes # 0.6 0.0 - 1.3 K/uL    Eosinophils # 0.3 0.0 - 0.6 K/uL    Basophils # 0.0 0.0 - 0.2 K/uL   Basic Metabolic Panel   Result Value Ref Range    Sodium 133 (L) 136 - 145 mmol/L    Potassium 4.4 3.5 - 5.1 mmol/L    Chloride 93 (L) 99 - 110 mmol/L    CO2 30 21 - 32 mmol/L    Anion Gap 10 3 - 16    Glucose 477 (H) 70 - 99 mg/dL    BUN 17 7 - 20 mg/dL    CREATININE 1.5 (H) 0.6 - 1.1 mg/dL    GFR Non- 36 (A) >60    GFR  44 (A) >60    Calcium 9.2 8.3 - 10.6 mg/dL   Sedimentation Rate   Result Value Ref Range    Sed Rate 94 (H) 0 - 30 mm/Hr   CRP Result Value Ref Range    CRP 94.5 (H) 0.0 - 5.1 mg/L   POCT Glucose   Result Value Ref Range    POC Glucose 384 (H) 70 - 99 mg/dl    Performed on ACCU-CHEK        RECENT VITALS:  BP: (!) 143/68, Temp: 98 °F (36.7 °C), Pulse: 80, Resp: 16, SpO2: 96 %       ED Course     Nursing Notes, Past Medical Hx,Past Surgical Hx, Social Hx, Allergies, and Family Hx were reviewed. The patient was given the following medications:  Orders Placed This Encounter   Medications    piperacillin-tazobactam (ZOSYN) 3.375 g in dextrose 5 % 100 mL IVPB (mini-bag)    vancomycin (VANCOCIN) 1,250 mg in dextrose 5 % 250 mL IVPB    insulin regular (HUMULIN R;NOVOLIN R) injection 5 Units       CONSULTS:  PHARMACY TO DOSE VANCOMYCIN  IP CONSULT TO PODIATRY  IP CONSULT TO HOSPITALIST  IP CONSULT TO PRIMARY CARE PROVIDER    MEDICAL DECISION MAKING / ASSESSMENT / Leora Scanlon is a 52 y.o. female who presents emergency Department with right lower extremity pain. Vital signs were stable on presentation and remained stable throughout her stay. Thorough history and physical exam was performed as detailed above. Patient presents to the emergency department from the clinic this morning after being evaluated for right lower extremity pain. She was previously had bilateral casts and walking boots for chronic diabetic wounds. She recently had the cast on the right lower extremity removed. She was seen in the podiatry clinic Friday of last week and the wounds were well appearing. Upon evaluation clinic today, her right lower extremity swollen, erythematous, and warm to touch. She states she has been experiencing pain over the last 1.5 days. Podiatry did put another note that they will would like evaluation for possible cellulitis versus DVT including CBC, BMP, CRP, sed rate, right lower shoulder x-ray, and venous duplex ultrasound. They would also like to start the patient on IV vancomycin and Zosyn.  I did obtain the requested labs along with blood cultures. Patient states that her pain has been worsening over the last 1.5 days. Her daughter states that she was instructed to wear a walking boot anytime she ambulated, however has not followed instructions. CBC came back with a white blood cell count of 9.7. BMP came back with an elevated blood glucose to 477. Sedimentation rate did come back at 94, however it appears her baseline is around 100. CRP was elevated to 94.5, her last CRP on 1/4/19 was 43. 8. X-ray right foot showed no acute fracture with intermediate soft tissue swelling. It did say of concern for osteomyelitis a triphasic bone scan or MRI may be useful. Venous duplex ultrasound of right lower shoulder showed an acute totally occluding DVT involving the right gastrocnemius vein. Blood cultures were obtained prior to starting IV Vancocin Zosyn per podiatry recommendations. After all the results were obtained, and a contact podiatry to update them. They stated they would like me to speak with hospitalist regarding a possible admission. I spoke with the hospitalist to would like me to speak with the AOD. I spoke with the AOD about the patient and they are willing to evaluate the patient at this time. Although the patient does have a confirmed DVT, and also concern for possible cellulitis given a significant increase in her CRP and her presentation on exam today. At this time, the patient will be admitted to the hospital for further care. This patient was also evaluated by the attending physician. All care plans were discussed and agreed upon. Clinical Impression     1. Right leg pain    2.  Acute deep vein thrombosis (DVT) of other specified vein of right lower extremity ([address]        Disposition     PATIENT REFERRED TO:  Cherylene Amas, MD  [facility]  [phone]            DISCHARGE MEDICATIONS:  New Prescriptions    No medications on file       DISPOSITION Admitted 03/25/2019 05:24:40 PM        Deyanira Borges Sarah Mcdaniel PA-C  03/25/19 1803

## 2019-03-25 NOTE — H&P
Internal Medicine PGY-*** Resident History & Physical      PCP: Georgina Mack MD    Date of Admission: 3/25/2019    Chief Complaint:  R leg pain    History Of Present Illness:     Pt is a 49yo F with a PMHx of Asthma, T2DM, DVT (3/2004), HLD, HTN, Pancreatitis, MRSA-infected leg wound (11/2017), DDD, extensive hx of cellulitis, and diabetic foot ulcers who presents with concern for R leg pain. Pt has been seeing Dr. Arias Forward and Podiatry in clinics closely, following up for her BL LE diabetic foot ulcers. Pt last saw Podiatry this past Friday where she says that they agreed her R foot was healing appropriately, and so her cast was moved to the left foot. Pt reports walking around \"a little too much\" on her R foot without proper \"protection\" and believes this is what is causing her current pain. She reports the R leg (below the knee) has been swollen, red, warm, and tender since Saturday and has only progressed since then. The patient denies fevers, chills, chest pain, shortness of breath, nausea, vomiting, diarrhea, or constipation. ED Course:  Pt arrived to ED afebrile, normotensive, HR 80, required 2L NC to sat 95. Initial glucose was found to be 477, subsequent testing showed it lowering to the 370's. Creatine is elevated at 1.5. Pt received Vancomycin 1250mg IV once and Zosyn 3.375g IV once. R ankle x-ray showed only soft tissue swelling. R leg doppler showed DVT acutely totally occluding gastrocnemius vein; no acute changes seen in left leg.       Past Medical History:          Diagnosis Date    Amenorrhea     Anomalies of nails     Asthma 5/14/04    Athlete's foot 8/2010    Bacterial vaginosis 04/2008    Carpal tunnel syndrome 5/2007    COPD (chronic obstructive pulmonary disease) ([facility] [address]     Diabetes mellitus type II 08/2007    Diabetic neuropathy ([facility] [address] 8/10    DVT (deep venous thrombosis) ([facility] [address] 3/2004    Dyslipidemia 5/2009    Dyspareunia 05/2009    ETOH abuse 3/04/2007    Feet clawing     tablet Take 1 tablet by mouth daily 2/19/19   Daniel Escalera MD   Lancets MISC 1 each by Does not apply route 2 times daily PHARMACY MAY SUBSTITUTE TO TRUE METRIX LANCETS 2/19/19   Daniel Escalera MD   Insulin Pen Needle 31G X 5 MM MISC 1 each by Does not apply route daily 2/19/19   Daniel Escalera MD   insulin glargine [facility]) 100 UNIT/ML injection pen Inject 30 Units into the skin nightly 2/18/19   Daniel Escalera MD   insulin glargine [facility]) 100 UNIT/ML injection pen Inject 20 Units into the skin every morning (before breakfast)  Patient taking differently: Inject 30 Units into the skin every morning (before breakfast)  2/18/19   Daniel Escalera MD   amitriptyline (ELAVIL) 100 MG tablet Take 1 tablet by mouth nightly 2/11/19   Daniel Escalera MD   furosemide (LASIX) 40 MG tablet Take 1 tablet by mouth 2 times daily 2/11/19   Daniel Escalera MD   gabapentin (NEURONTIN) 800 MG tablet Take 1 tablet by mouth 4 times daily for 90 days. . 2/11/19 5/12/19  Daniel Escalera MD   methadone (DOLOPHINE) 10 MG tablet Take 140 mg by mouth daily. Shannon Robles Historical Provider, MD   blood glucose test strips (TRUE METRIX BLOOD GLUCOSE TEST) strip 1 each by In Vitro route 2 times daily As needed. 7/23/18   Daniel Escalera MD   nicotine (NICODERM CQ) 14 MG/24HR Place 1 patch onto the skin every 24 hours 6/5/18   Mike Neumann DPM   Gauze Pads & Dressings MISC Please dispense 4x8 guaze, kerlix, and ace 2/23/18   Parag Da Silva DPM   ammonium lactate (LAC-HYDRIN) 12 % lotion Apply topically daily. Patient taking differently: as needed Apply topically daily.  1/29/18   Parag Da Silva DPM   Accu-Chek Softclix Lancets MISC 1 strip by Does not apply route 2 times daily Check before breakfast and before going to bed 4/20/17   Daniel Ecsalera MD       Allergies:  Sulfa antibiotics    Social History:      The patient currently lives at home with daughter    TOBACCO:   reports that she has been smoking cigarettes. She has a 30.00 pack-year smoking history. She has never used smokeless tobacco.  ETOH:  reports that she does not drink alcohol. Family History:     History reviewed. No pertinent family history. REVIEW OF SYSTEMS: Pertinent positives as noted in the HPI. All other systems reviewed and negative. ROS: Review of Systems   Constitutional: Negative. HENT: Negative. Eyes: Negative. Respiratory: Positive for chest tightness. Negative for cough, shortness of breath and wheezing. Cardiovascular: Positive for leg swelling. Negative for chest pain and palpitations. Gastrointestinal: Positive for constipation. Negative for abdominal pain, diarrhea, nausea and vomiting. Genitourinary: Negative. PHYSICAL EXAM PERFORMED:    BP (!) 143/68   Pulse 80   Temp 98 °F (36.7 °C) (Oral)   Resp 16   Ht 5' 2\" (1.575 m)   Wt 188 lb (85.3 kg)   SpO2 96%   BMI 34.39 kg/m²     General appearance:  Lethargic. No apparent distress, appears stated age and cooperative. HEENT:  Normal cephalic,atraumatic without obvious deformity. Pupils equal, round, and reactive to light. Extra ocular muscles intact. Conjunctivae/corneas clear. Neck: Supple, with full range of motion. No jugular venous distention. Trachea midline. Respiratory:  Normal respiratory effort. Bibasilar crackles. Cardiovascular:  Regular rate and rhythm with normal S1/S2 without murmurs, rubs or gallops. Abdomen: Soft, non-tender, non-distended with normal bowel sounds. Musculoskeletal:  No clubbing, cyanosis bilaterally. +1 non-pitting edema at RLE (below patella); tender to palpation. Full range of motion without deformity. Skin: Skin color, texture, turgor normal. Hyperpigmentation of RLE extending from knee down to foot. Pt has cast on LLE which obstructed exam. RLE was wrapped in ACE. Neurologic:  Neurovascularly intact without any focal sensory/motor deficits.  Cranialnerves: II-XII intact, grossly non-focal.  Psychiatric:  Alert and oriented, thought content appropriate,normal insight  Capillary Refill: Brisk,< 3 seconds   Peripheral Pulses: +2 palpable, equal bilaterally       Labs:     Recent Labs     03/25/19  1210   WBC 9.7   HGB 10.3*   HCT 31.6*        Recent Labs     03/25/19  1210   *   K 4.4   CL 93*   CO2 30   BUN 17   CREATININE 1.5*   CALCIUM 9.2     No results for input(s): AST, ALT, BILIDIR, BILITOT, ALKPHOS in the last 72 hours. No results for input(s): INR in the last 72 hours. No results for input(s): [employer] in the last 72 hours. Urinalysis:      Lab Results   Component Value Date    NITRU Negative 03/26/2018    WBCUA 0-2 03/26/2018    BACTERIA Rare 03/26/2018    RBCUA 3-5 03/26/2018    BLOODU TRACE-LYSED 03/26/2018    SPECGRAV 1.015 03/26/2018    GLUCOSEU >=1000 03/26/2018       Radiology:     VL Extremity Venous Right   Final Result      XR FOOT RIGHT (2 VIEWS)   Final Result      Indeterminate soft tissue swelling. Previous amputation of the fifth digit. No acute fracture. If concern for acute injury splinting with follow-up recommended      If concern for osteomyelitis triphasic bone scan or MRI may be useful. ASSESSMENT & PLAN:  Codey Landon is a 52 y.o. female w/ a PMHx of Asthma, T2DM, DVT (3/2004), HLD, HTN, Pancreatitis, MRSA-infected leg wound (11/2017), DDD, extensive hx of cellulitis, and diabetic foot ulcers who presents with concern for R leg pain. Active Hospital Problems    Diagnosis Date Noted    Cellulitis and abscess of foot [L03.119, L02.619] 03/25/2019     RLE Cellulitis  - Pt has acute edema, tenderness, erythema, and hyperpigmentation of RLE since past Saturday, combined with hx of T2DM and diabetic foot ulcers, suspicion of cellulitis is high.  - Received Vancomycin and Zosyn in ED  - R ankle X-ray: Soft tissue swelling, no fracture  - Podiatry consulted, appreciate rec's.   - Continue Vancomycin + Zosyn    DVT

## 2019-03-25 NOTE — H&P
Internal Medicine PGY- 1 Resident History & Physical      PCP: Lester Peña MD    Date of Admission: 3/25/2019    Chief Complaint:  R leg pain    History Of Present Illness:     Pt is a 47yo F with a PMHx of Asthma, T2DM, DVT (3/2004), HLD, HTN, Pancreatitis, MRSA-infected leg wound (11/2017), DDD, extensive hx of cellulitis, and diabetic foot ulcers who presents with concern for R leg pain. Pt has been seeing Dr. Vonda Curtis and Podiatry in clinics closely, following up for her BL LE diabetic foot ulcers. Pt last saw Podiatry this past Friday where she says that they agreed her R foot was healing appropriately, and so her cast was moved to the left foot. Pt reports walking around \"a little too much\" on her R foot without proper \"protection\" and believes this is what is causing her current pain. She reports the R leg (below the knee) has been swollen, red, warm, and tender since Saturday and has only progressed since then. The patient denies fevers, chills, chest pain, shortness of breath, nausea, vomiting, diarrhea, or constipation. ED Course:  Pt arrived to ED afebrile, normotensive, HR 80, required 2L NC to sat 95. Initial glucose was found to be 477, subsequent testing showed it lowering to the 370's. Creatine is elevated at 1.5. Pt received Vancomycin 1250mg IV once and Zosyn 3.375g IV once. R ankle x-ray showed only soft tissue swelling. R leg doppler showed DVT acutely totally occluding gastrocnemius vein; no acute changes seen in left leg.       Past Medical History:          Diagnosis Date    Amenorrhea     Anomalies of nails     Asthma 5/14/04    Athlete's foot 8/2010    Bacterial vaginosis 04/2008    Carpal tunnel syndrome 5/2007    [facility]     Diabetes mellitus type II 08/2007    Diabetic neuropathy ([facility] 8/10    DVT (deep venous thrombosis) ([facility] 3/2004    Dyslipidemia 5/2009    Dyspareunia 05/2009    ETOH abuse 3/04/2007    Feet clawing     HTN (hypertension)     Hx of blood clots     Hyperlipidemia     MRSA (methicillin resistant staph aureus) culture positive 2017; 2017    foot; leg     Neuropathy 2009    polyneuropathy    Pain, back 2008    Pain, eye, right 04    Pancreatitis 04    Pseudocyst, pancreas 04    Scalp lesion 2007    Tinea pedis     Tobacco abuse 2008    Vaginal bleeding, abnormal 2007       Past Surgical History:          Procedure Laterality Date     SECTION  unknown    FOOT DEBRIDEMENT Right 2019    INCISION AND DRAINAGE WITH APPLICATION OF STRAVIX GRAFT RIGHT FOOT performed by Lo Mcallister DPM at [address] Left 2016    I & D left foot    OTHER SURGICAL HISTORY Right 10/20/2017    RIGHT GASTROC LENGTHENING ENDOSCOPIC, INJECTION OF AMNI GRAFT    OTHER SURGICAL HISTORY Right 2018    Diabetic foot ulcer I&D w/ integra graft application    TN DEBRIDEMENT, SKIN, SUB-Q TISSUE,MUSCLE,BONE,=<20 SQ CM Right 2018    RIGHT FOOT DEBRIDEMENT INCISION AND DRAINAGE, PARTIAL 5TH RAY AMPUTATION performed by Lo Mcallister DPM at [address] / [address]  [address]    cryotherapy done on lesion    TOE AMPUTATION Left 2017    AMPUTATION LEFT GREAT TOE                    Medications Prior to Admission:      Prior to Admission medications    Medication Sig Start Date End Date Taking?  Authorizing Provider   aspirin 325 MG tablet Take 1 tablet by mouth daily 19   Jorge Sorenson MD   albuterol sulfate  (90 Base) MCG/ACT inhaler Inhale 2 puffs into the lungs every 6 hours as needed for Wheezing 19   Jorge Sorenson MD   metoprolol succinate (TOPROL XL) 50 MG extended release tablet Take 1 tablet by mouth nightly 19   Jorge Sorenson MD   omeprazole (PRILOSEC) 20 MG delayed release capsule Take 1 capsule by mouth Daily 19   Jorge Sorenson MD   atorvastatin (LIPITOR) 20 MG tablet Take 1 tablet by mouth daily 2/19/19   Melba Allan MD   Lancets MISC 1 each by Does not apply route 2 times daily PHARMACY MAY SUBSTITUTE TO TRUE METRIX LANCETS 2/19/19   Melba Allan MD   Insulin Pen Needle 31G X 5 MM MISC 1 each by Does not apply route daily 2/19/19   Melba Allan MD   insulin glargine [facility]) 100 UNIT/ML injection pen Inject 30 Units into the skin nightly 2/18/19   Melba Allan MD   insulin glargine [facility]) 100 UNIT/ML injection pen Inject 20 Units into the skin every morning (before breakfast)  Patient taking differently: Inject 30 Units into the skin every morning (before breakfast)  2/18/19   Melba Allan MD   amitriptyline (ELAVIL) 100 MG tablet Take 1 tablet by mouth nightly 2/11/19   Melba Allan MD   furosemide (LASIX) 40 MG tablet Take 1 tablet by mouth 2 times daily 2/11/19   Melba Allan MD   gabapentin (NEURONTIN) 800 MG tablet Take 1 tablet by mouth 4 times daily for 90 days. . 2/11/19 5/12/19  Melba Allan MD   methadone (DOLOPHINE) 10 MG tablet Take 140 mg by mouth daily. Joey Thomas MD   blood glucose test strips (TRUE METRIX BLOOD GLUCOSE TEST) strip 1 each by In Vitro route 2 times daily As needed. 7/23/18   Melba Allan MD   nicotine (NICODERM CQ) 14 MG/24HR Place 1 patch onto the skin every 24 hours 6/5/18   Rosa Yung DPM   Gauze Pads & Dressings MISC Please dispense 4x8 guaze, kerlix, and ace 2/23/18   Gurinder Nicole DPM   ammonium lactate (LAC-HYDRIN) 12 % lotion Apply topically daily. Patient taking differently: as needed Apply topically daily.  1/29/18   Gurinder Nicole DPM   Accu-Chek Softclix Lancets MISC 1 strip by Does not apply route 2 times daily Check before breakfast and before going to bed 4/20/17   Melba Allan MD       Allergies:  Sulfa antibiotics    Social History:      The patient currently lives at home with daughter    TOBACCO:   reports that she has been smoking cigarettes. She has a 30.00 pack-year smoking history. She has never used smokeless tobacco.  ETOH:  reports that she does not drink alcohol. Family History:     History reviewed. No pertinent family history. REVIEW OF SYSTEMS: Pertinent positives as noted in the HPI. All other systems reviewed and negative. ROS: Review of Systems   Constitutional: Negative. HENT: Negative. Eyes: Negative. Respiratory: Positive for chest tightness. Negative for cough, shortness of breath and wheezing. Cardiovascular: Positive for leg swelling. Negative for chest pain and palpitations. Gastrointestinal: Positive for constipation. Negative for abdominal pain, diarrhea, nausea and vomiting. Genitourinary: Negative. PHYSICAL EXAM PERFORMED:    BP (!) 132/40   Pulse 85   Temp 98.2 °F (36.8 °C) (Oral)   Resp 16   Ht 5' 2\" (1.575 m)   Wt 188 lb (85.3 kg)   SpO2 92%   BMI 34.39 kg/m²     General appearance:  Lethargic. No apparent distress, appears stated age and cooperative. HEENT:  Normal cephalic,atraumatic without obvious deformity. Pupils equal, round, and reactive to light. Extra ocular muscles intact. Conjunctivae/corneas clear. Neck: Supple, with full range of motion. No jugular venous distention. Trachea midline. Respiratory:  Normal respiratory effort. Bibasilar crackles. Cardiovascular:  Regular rate and rhythm with normal S1/S2 without murmurs, rubs or gallops. Abdomen: Soft, non-tender, non-distended with normal bowel sounds. Musculoskeletal:  No clubbing, cyanosis bilaterally. +1 non-pitting edema at RLE (below patella); tender to palpation. Full range of motion without deformity. Skin: Skin color, texture, turgor normal. Hyperpigmentation of RLE extending from knee down to foot. Pt has cast on LLE which obstructed exam. RLE was wrapped in ACE. Neurologic:  Neurovascularly intact without any focal sensory/motor deficits.  Cranialnerves: II-XII intact, grossly non-focal.  Psychiatric:  Alert and oriented, thought content appropriate,normal insight  Capillary Refill: Brisk,< 3 seconds   Peripheral Pulses: +2 palpable, equal bilaterally       Labs:     Recent Labs     03/25/19  1210   WBC 9.7   HGB 10.3*   HCT 31.6*        Recent Labs     03/25/19  1210   *   K 4.4   CL 93*   CO2 30   BUN 17   CREATININE 1.5*   CALCIUM 9.2     No results for input(s): AST, ALT, BILIDIR, BILITOT, ALKPHOS in the last 72 hours. No results for input(s): INR in the last 72 hours. No results for input(s): [place] in the last 72 hours. Urinalysis:      Lab Results   Component Value Date    NITRU Negative 03/26/2018    WBCUA 0-2 03/26/2018    BACTERIA Rare 03/26/2018    RBCUA 3-5 03/26/2018    BLOODU TRACE-LYSED 03/26/2018    SPECGRAV 1.015 03/26/2018    GLUCOSEU >=1000 03/26/2018       Radiology:     VL Extremity Venous Right   Final Result      XR FOOT RIGHT (2 VIEWS)   Final Result      Indeterminate soft tissue swelling. Previous amputation of the fifth digit. No acute fracture. If concern for acute injury splinting with follow-up recommended      If concern for osteomyelitis triphasic bone scan or MRI may be useful. ASSESSMENT & PLAN:  Lashonda Beard is a 52 y.o. female w/ a PMHx of Asthma, T2DM, DVT (3/2004), HLD, HTN, Pancreatitis, MRSA-infected leg wound (11/2017), DDD, extensive hx of cellulitis, and diabetic foot ulcers who presents with concern for R leg pain. Active Hospital Problems    Diagnosis Date Noted    Cellulitis and abscess of foot [L03.119, L02.619] 03/25/2019    Cellulitis [L03.90] 03/27/2018     RLE Cellulitis  - Pt has acute edema, tenderness, erythema, and hyperpigmentation of RLE since past Saturday, combined with hx of T2DM and diabetic foot ulcers, suspicion of cellulitis is high.  - Received Vancomycin and Zosyn in ED  - R ankle X-ray: Soft tissue swelling, no fracture  - Podiatry consulted, appreciate rec's.   - Continue Vancomycin + Zosyn    DVT (R gastrocnemius)  - Pt has hx of DVTs  - Doppler showed acute total occlusion of R gastrocnemius V.  - Resume home Aspirin 324mg PO qD  - Heparin 5000U SubQ TID    Hx of T2DM   - Glucose 477 on admission  - Insulin sliding scale  - Hypoglycemic protocols in place    Hx of Asthma  - Currently on 2L NC  - Resume home inhaler    Hx of HTN  - BP normotensive on admission  - Resume home Metoprolol Succinate 50mg PO qHs  - Resume home Lasix 40mg PO BID    Hx of DDD  - Resume home Amitriptyline 100mg PO qHs for pain control  - Resume home Gabapentin 800mg PO 4x/day    Hx of HLD  - Resume home Lipitor 20mg PO qD    Smoking Hx  - Nicotine patch      DVT Prophylaxis: Heparin subQ  Diet: DIET CARB CONTROL;  Code Status: Full Code      Dispo - GMF       Poncho Blanco MD    I will discuss the patient with the senior resident and Caleb Padilla MD

## 2019-03-25 NOTE — CONSULTS
Clinical Pharmacy Progress Note    Admit date: 3/25/2019   Patient presents to the ED after being sent from the [facility]. Patient has pain, redness, swelling and green drainage from wound on R foot. Awaiting doppler. Antibiotics started for diabetic ulcer. Pharmacy is consulted to dose Vancomycin x1 dose in ED per Sarwat Gan PA-C.    Ht = 62 in  Wt = 85.3 kg      Assessment/Plan:  · This patient received vancomycin in Aug 2018. Renal function about the same as at that time. She was placed on 1g IV q12h. Trough was drawn ~4.5 hrs early, yet the resulting trough likely would've been supratherpeutic. · Will order Vancomycin 1.25g IV x1 for administration in ED per ER pharmacy consult. · If Vancomycin is to continue on admission and pharmacy is to manage dosing, please re-consult with admission orders. Recommend to start a dose of 1.25g IV q24h, as the patient doesn't seem to clear vancomycin as expected.         Thanks--  Deward Cushing PharmD., [facility]   3/25/2019 1:08 PM  Wireless: [phone]

## 2019-03-26 PROBLEM — E11.42 DIABETIC POLYNEUROPATHY ASSOCIATED WITH TYPE 2 DIABETES MELLITUS (HCC): Status: ACTIVE | Noted: 2019-03-26

## 2019-03-26 PROBLEM — A49.1 GROUP B STREPTOCOCCAL INFECTION: Status: ACTIVE | Noted: 2019-03-26

## 2019-03-26 LAB
ANION GAP SERPL CALCULATED.3IONS-SCNC: 8 MMOL/L (ref 3–16)
BASOPHILS ABSOLUTE: 0 K/UL (ref 0–0.2)
BASOPHILS RELATIVE PERCENT: 0.6 %
BUN BLDV-MCNC: 19 MG/DL (ref 7–20)
CALCIUM SERPL-MCNC: 8.8 MG/DL (ref 8.3–10.6)
CHLORIDE BLD-SCNC: 100 MMOL/L (ref 99–110)
CO2: 29 MMOL/L (ref 21–32)
CREAT SERPL-MCNC: 1.5 MG/DL (ref 0.6–1.1)
EOSINOPHILS ABSOLUTE: 0.3 K/UL (ref 0–0.6)
EOSINOPHILS RELATIVE PERCENT: 4.2 %
GFR AFRICAN AMERICAN: 44
GFR NON-AFRICAN AMERICAN: 36
GLUCOSE BLD-MCNC: 309 MG/DL (ref 70–99)
GLUCOSE BLD-MCNC: 316 MG/DL (ref 70–99)
HCT VFR BLD CALC: 32 % (ref 36–48)
HEMOGLOBIN: 10.6 G/DL (ref 12–16)
LYMPHOCYTES ABSOLUTE: 2.1 K/UL (ref 1–5.1)
LYMPHOCYTES RELATIVE PERCENT: 27.7 %
MCH RBC QN AUTO: 28.8 PG (ref 26–34)
MCHC RBC AUTO-ENTMCNC: 33.1 G/DL (ref 31–36)
MCV RBC AUTO: 87 FL (ref 80–100)
MONOCYTES ABSOLUTE: 0.4 K/UL (ref 0–1.3)
MONOCYTES RELATIVE PERCENT: 4.7 %
NEUTROPHILS ABSOLUTE: 4.7 K/UL (ref 1.7–7.7)
NEUTROPHILS RELATIVE PERCENT: 62.8 %
PDW BLD-RTO: 16 % (ref 12.4–15.4)
PERFORMED ON: ABNORMAL
PLATELET # BLD: 235 K/UL (ref 135–450)
PMV BLD AUTO: 8.8 FL (ref 5–10.5)
POTASSIUM REFLEX MAGNESIUM: 4.4 MMOL/L (ref 3.5–5.1)
RBC # BLD: 3.68 M/UL (ref 4–5.2)
SODIUM BLD-SCNC: 137 MMOL/L (ref 136–145)
WBC # BLD: 7.5 K/UL (ref 4–11)

## 2019-03-26 PROCEDURE — 99223 1ST HOSP IP/OBS HIGH 75: CPT | Performed by: INTERNAL MEDICINE

## 2019-03-26 PROCEDURE — 97530 THERAPEUTIC ACTIVITIES: CPT

## 2019-03-26 PROCEDURE — 96366 THER/PROPH/DIAG IV INF ADDON: CPT

## 2019-03-26 PROCEDURE — 97166 OT EVAL MOD COMPLEX 45 MIN: CPT

## 2019-03-26 PROCEDURE — 96372 THER/PROPH/DIAG INJ SC/IM: CPT

## 2019-03-26 PROCEDURE — 6360000002 HC RX W HCPCS: Performed by: STUDENT IN AN ORGANIZED HEALTH CARE EDUCATION/TRAINING PROGRAM

## 2019-03-26 PROCEDURE — 6360000002 HC RX W HCPCS: Performed by: FAMILY MEDICINE

## 2019-03-26 PROCEDURE — 85025 COMPLETE CBC W/AUTO DIFF WBC: CPT

## 2019-03-26 PROCEDURE — 97116 GAIT TRAINING THERAPY: CPT

## 2019-03-26 PROCEDURE — 97162 PT EVAL MOD COMPLEX 30 MIN: CPT

## 2019-03-26 PROCEDURE — 6370000000 HC RX 637 (ALT 250 FOR IP): Performed by: STUDENT IN AN ORGANIZED HEALTH CARE EDUCATION/TRAINING PROGRAM

## 2019-03-26 PROCEDURE — 80048 BASIC METABOLIC PNL TOTAL CA: CPT

## 2019-03-26 PROCEDURE — 1200000000 HC SEMI PRIVATE

## 2019-03-26 PROCEDURE — 97535 SELF CARE MNGMENT TRAINING: CPT

## 2019-03-26 PROCEDURE — 6370000000 HC RX 637 (ALT 250 FOR IP): Performed by: FAMILY MEDICINE

## 2019-03-26 PROCEDURE — 96367 TX/PROPH/DG ADDL SEQ IV INF: CPT

## 2019-03-26 PROCEDURE — 36415 COLL VENOUS BLD VENIPUNCTURE: CPT

## 2019-03-26 PROCEDURE — 2580000003 HC RX 258: Performed by: STUDENT IN AN ORGANIZED HEALTH CARE EDUCATION/TRAINING PROGRAM

## 2019-03-26 RX ADMIN — INSULIN GLARGINE 36 UNITS: 100 INJECTION, SOLUTION SUBCUTANEOUS at 22:22

## 2019-03-26 RX ADMIN — PANTOPRAZOLE SODIUM 40 MG: 40 TABLET, DELAYED RELEASE ORAL at 07:05

## 2019-03-26 RX ADMIN — VANCOMYCIN HYDROCHLORIDE 1250 MG: 10 INJECTION, POWDER, LYOPHILIZED, FOR SOLUTION INTRAVENOUS at 14:37

## 2019-03-26 RX ADMIN — ENOXAPARIN SODIUM 80 MG: 80 INJECTION SUBCUTANEOUS at 11:38

## 2019-03-26 RX ADMIN — INSULIN LISPRO 3 UNITS: 100 INJECTION, SOLUTION INTRAVENOUS; SUBCUTANEOUS at 18:47

## 2019-03-26 RX ADMIN — PIPERACILLIN SODIUM,TAZOBACTAM SODIUM 3.38 G: 3; .375 INJECTION, POWDER, FOR SOLUTION INTRAVENOUS at 13:48

## 2019-03-26 RX ADMIN — INSULIN LISPRO 2 UNITS: 100 INJECTION, SOLUTION INTRAVENOUS; SUBCUTANEOUS at 22:22

## 2019-03-26 RX ADMIN — INSULIN GLARGINE 36 UNITS: 100 INJECTION, SOLUTION SUBCUTANEOUS at 08:31

## 2019-03-26 RX ADMIN — AMPICILLIN SODIUM AND SULBACTAM SODIUM 1.5 G: 1; .5 INJECTION, POWDER, FOR SOLUTION INTRAMUSCULAR; INTRAVENOUS at 22:05

## 2019-03-26 RX ADMIN — FUROSEMIDE 40 MG: 40 TABLET ORAL at 08:34

## 2019-03-26 RX ADMIN — PIPERACILLIN SODIUM,TAZOBACTAM SODIUM 3.38 G: 3; .375 INJECTION, POWDER, FOR SOLUTION INTRAVENOUS at 04:10

## 2019-03-26 RX ADMIN — GABAPENTIN 800 MG: 400 CAPSULE ORAL at 08:34

## 2019-03-26 RX ADMIN — Medication 10 ML: at 09:24

## 2019-03-26 RX ADMIN — METOPROLOL SUCCINATE 50 MG: 50 TABLET, EXTENDED RELEASE ORAL at 22:05

## 2019-03-26 RX ADMIN — GABAPENTIN 800 MG: 400 CAPSULE ORAL at 17:50

## 2019-03-26 RX ADMIN — GABAPENTIN 800 MG: 400 CAPSULE ORAL at 13:42

## 2019-03-26 RX ADMIN — Medication 10 ML: at 22:08

## 2019-03-26 RX ADMIN — INSULIN LISPRO 5 UNITS: 100 INJECTION, SOLUTION INTRAVENOUS; SUBCUTANEOUS at 18:47

## 2019-03-26 RX ADMIN — INSULIN LISPRO 12 UNITS: 100 INJECTION, SOLUTION INTRAVENOUS; SUBCUTANEOUS at 08:32

## 2019-03-26 RX ADMIN — AMITRIPTYLINE HYDROCHLORIDE 100 MG: 50 TABLET, FILM COATED ORAL at 22:06

## 2019-03-26 RX ADMIN — METHADONE HYDROCHLORIDE 140 MG: 10 TABLET ORAL at 09:21

## 2019-03-26 RX ADMIN — ATORVASTATIN CALCIUM 20 MG: 20 TABLET, FILM COATED ORAL at 08:34

## 2019-03-26 RX ADMIN — FUROSEMIDE 40 MG: 40 TABLET ORAL at 17:50

## 2019-03-26 RX ADMIN — INSULIN LISPRO 3 UNITS: 100 INJECTION, SOLUTION INTRAVENOUS; SUBCUTANEOUS at 13:43

## 2019-03-26 RX ADMIN — GABAPENTIN 800 MG: 400 CAPSULE ORAL at 22:05

## 2019-03-26 ASSESSMENT — ENCOUNTER SYMPTOMS
DIARRHEA: 0
CONSTIPATION: 0
NAUSEA: 0
SHORTNESS OF BREATH: 0
CHEST TIGHTNESS: 0
COLOR CHANGE: 1
BLOOD IN STOOL: 0
COUGH: 0
VOMITING: 0

## 2019-03-26 ASSESSMENT — PAIN SCALES - GENERAL: PAINLEVEL_OUTOF10: 5

## 2019-03-26 NOTE — CONSULTS
Med Rec consult per [address] of information is patient, [employer] and [address]. Last dose times were already entered in med rec as done by admission nurse. Changed:  - Lottie Avila - per Kacey patient takes 40 units nightly however patient states she takes 30 units BID    Unable to confirm Methadone dose at this time. Recommend following up in AM to confirm dose with clinic    Jen Friday, PharmD, [address] 3/25/2019 10:16 PM       Addendum 3/26 11:15:    Home dose of Methadone verified to be 140mg po daily.     Please call with questions--  Thanks--  Norman Bob, PharmD, [address] [facility], [place], [address] Critical access hospital or [phone] (wireless)  3/26/2019 11:19 AM

## 2019-03-26 NOTE — PLAN OF CARE
Patient up to chair with chair alarm activated. Call light and bedside table within reach. Will continue to monitor.

## 2019-03-26 NOTE — CONSULTS
04    Scalp lesion 2007    Tinea pedis     Tobacco abuse 2008    Vaginal bleeding, abnormal 2007     Past Surgical History:        Procedure Laterality Date     SECTION  unknown    FOOT DEBRIDEMENT Right 2019    INCISION AND DRAINAGE WITH APPLICATION OF STRAVIX GRAFT RIGHT FOOT performed by Lulu Fowler DPM at [address] Left 2016    I & D left foot    OTHER SURGICAL HISTORY Right 10/20/2017    RIGHT GASTROC LENGTHENING ENDOSCOPIC, INJECTION OF AMNI GRAFT    OTHER SURGICAL HISTORY Right 2018    Diabetic foot ulcer I&D w/ integra graft application    DE DEBRIDEMENT, SKIN, SUB-Q TISSUE,MUSCLE,BONE,=<20 SQ CM Right 2018    RIGHT FOOT DEBRIDEMENT INCISION AND DRAINAGE, PARTIAL 5TH RAY AMPUTATION performed by Lulu Fowler DPM at [facility] / [address]  [address]    cryotherapy done on lesion    TOE AMPUTATION Left 2017    AMPUTATION LEFT GREAT TOE                  Current Medications:    Current Facility-Administered Medications: sodium hypochlorite (DAKINS) 0.25 % external solution, , Irrigation, Daily  amitriptyline (ELAVIL) tablet 100 mg, 100 mg, Oral, Nightly  atorvastatin (LIPITOR) tablet 20 mg, 20 mg, Oral, Daily  furosemide (LASIX) tablet 40 mg, 40 mg, Oral, BID  gabapentin (NEURONTIN) capsule 800 mg, 800 mg, Oral, 4x Daily  pantoprazole (PROTONIX) tablet 40 mg, 40 mg, Oral, QAM AC  nicotine (NICODERM CQ) 14 MG/24HR 1 patch, 1 patch, Transdermal, Q24H  methadone (DOLOPHINE) tablet 140 mg, 140 mg, Oral, Daily  metoprolol succinate (TOPROL XL) extended release tablet 50 mg, 50 mg, Oral, Nightly  sodium chloride flush 0.9 % injection 10 mL, 10 mL, Intravenous, 2 times per day  sodium chloride flush 0.9 % injection 10 mL, 10 mL, Intravenous, PRN  magnesium hydroxide (MILK OF MAGNESIA) 400 MG/5ML suspension 30 mL, 30 mL, Oral, Daily PRN  ondansetron (ZOFRAN) injection 4 mg, 4 mg, Intravenous, Q6H Complete loss of protective sensation and epicritic sensation. VASCULAR:    DP/PT 1/4 RLE, cap refill instant. MUSCULOSKELETAL:  RLE s/p 5th digit and partial ray amputation, otherwise rectus foot with 5/5 strength in all quadrants. IMPRESSION/RECOMMENDATIONS:      1. Diabetic foot ulcer RLE Carpenter grade 1     2. Mild cellulitis of R lateral foot localized. 3. DVT RLE     4. DFU of LLE currently in TCC. - patient seen bedside, discussed likely etiology of condition and treatment plan. - no leukocytosis, ESR elevated at baseline, CRP elevated likely related to DVT. - dressed RLE in wet to dry with DSD, ACE for compression to tibial tuberosity.   - LLE TCC to be left in place until Friday then replaced.   - NON  WB to RLE, WBAT to LLE. - consult placed to ID recs appreciated. DISPO: DVT of RLE in setting of mild cellulitis of R foot DFU, will provide local wound care while in patient, ID consulted for recs regarding cx taken, then patient will f/u outpatient at the resident clinic.        - The patient will be staffed with Dr. Wilfredo Leyva, DPM   Podiatric Resident, PGY-2  Pager: [phone]  3/26/2019, 9:52 AM

## 2019-03-26 NOTE — PROGRESS NOTES
Patient A&O x4, VSS, at times pt is very sleepy at times yet easy to arouse, pt states pain in right leg is better, tolerating diet, voiding well, ambulating with walker to and from bathroom with assistance, daughter at bedside. Will continue to monitor.

## 2019-03-26 NOTE — PROGRESS NOTES
Met with pt & daughter. Pt has had diabetes education in the past when she was a patient in the hospital. Pt states that she has a glucose meter & supplies at home as well as Lantus insulin and pen needles. Pt states that she does not have any questions or concerns at this time in relation to her diabetes. Will continue to follow.

## 2019-03-26 NOTE — CONSULTS
Clinical Pharmacy Progress Note    Admit date: 3/25/2019     Subjective/Objective:  Pt is a 46yof with PMHx that includes HTN, HLD, DM 2, peripheral neuropathy, pancreatitis with pseudocyst, and B/L LE diabetic foot ulcers requiring prior surgical intervention who is admitted with RLE pain - found to have acute RLE DVT and Rt foot ulcer with mild cellulitis. Wounds have previously been polymicrobial - growing Enterobacter, E.coli, Pseudomonas, CNS, MRSA, enterococcus, and C.albicans at different times over the past couple of years. Pharmacy is consulted to dose Vancomycin per Bernadette King & Heather Alejandra    Current antibiotics:  Zosyn 3.375g IV EI q8h - day #2  Vancomycin - Pharmacy to dose - day #2   1.25g IV q24h (3/25 - current)    Pt was on Vancomycin during prior admission in August 2018. At that time, patient received 1g IV q12h, which resulted in a trough of 26.4mcg/mL. This was drawn ~4.5hr early, but actual 12-hr trough likely still supratherapeutic. Dose was decreased to 1.25g IV q24h, but was stopped prior to a repeat trough. SCr did bump slightly from 1.2-->1.6 during this time. Current anticoagulation regimen:  Enoxaparin 1mg/kg (80mg) subcut q12h      Recent Labs     03/25/19  1210 03/26/19  0710   * 137   K 4.4 4.4   CL 93* 100   CO2 30 29   BUN 17 19   CREATININE 1.5* 1.5*   GLUCOSE 477* 309*       Estimated Creatinine Clearance: 43 mL/min (A) (based on SCr of 1.5 mg/dL (H)).     Lab Results   Component Value Date    WBC 7.5 03/26/2019    HGB 10.6 (L) 03/26/2019    HCT 32.0 (L) 03/26/2019    MCV 87.0 03/26/2019     03/26/2019       Lab Results   Component Value Date    PROTIME 10.6 01/07/2019    INR 0.93 01/07/2019         Culture results:  Blood (3/25) = pending  Wound (3/25) = Gr B beta-hemolytic strep, reliably sensitive to b-lactams, others    Prophylaxis:  VTE:  Enoxaparin 80mg subcut BID  GI:  Not indicated      Assessment/Plan:  1)  RLE diabetic foot ulcer with cellulitis: Zosyn + Vancomycin - day #2  · Vancomycin - Pharmacy to dose  · Continue 1.25g IV q24h. · Clinical condition will be monitored closely, and levels will be ordered & dose adjustments made as appropriate. · As wound cultures have grown Gr B strep, recommend de-escalating to Clindamycin 300mg po q6h or Augmentin 875mg po BID for treatment of cellulitis. ID is consulted - defer abx decisions to them.     Please call with questions--  Thanks--  Jonathan Waters, PharmD, [address] [facility], [address] Critical access hospital or [phone] (wireless)  3/26/2019 11:34 AM

## 2019-03-26 NOTE — PROGRESS NOTES
Physical Therapy    Facility/Department: [facility]  Initial Assessment/discharge note      NAME: Nate Benavides  : 1965  MRN: [id number]    Date of Service: 3/26/2019    Discharge Recommendations:Tameka Cruz scored a 20/24 on the AM-PAC short mobility form. Current research shows that an AM-PAC score of 18 or greater is typically associated with a discharge to the patient's home setting. PT Equipment Recommendations  Equipment Needed: No    Assessment   Assessment: Pt demo mobility close to or at her functional baseline. Pt plans to return home with assist per family. No acute PT needs. If home, recommend 24 hour supervision as before and use of rolling walker at all times for safety. Recommend nursing encourage pt to be up PRN and often with assist while here in hospital.   Decision Making: Medium Complexity  Patient Education: Pt educated on PT role,need to call for assist to get up and she verbalized understanding. REQUIRES PT FOLLOW UP: No  Activity Tolerance  Activity Tolerance: Patient Tolerated treatment well;Patient limited by cognitive status(pt impulsive)       Patient Diagnosis(es): The primary encounter diagnosis was Right leg pain. A diagnosis of Acute deep vein thrombosis (DVT) of other specified vein of right lower extremity (HCC) was also pertinent to this visit.      has a past medical history of Amenorrhea, Anomalies of nails, Asthma, Athlete's foot, Bacterial vaginosis, Carpal tunnel syndrome, COPD (chronic obstructive pulmonary disease) ([address] Diabetes mellitus type II, Diabetic neuropathy ([address] DVT (deep venous thrombosis) ([address] Dyslipidemia, Dyspareunia, ETOH abuse, Feet clawing, HTN (hypertension), Hx of blood clots, Hyperlipidemia, MRSA (methicillin resistant staph aureus) culture positive, Neuropathy, Pain, back, Pain, eye, right, Pancreatitis, Pseudocyst, pancreas, Scalp lesion, Tinea pedis, Tobacco abuse, and Vaginal bleeding, abnormal.   has a past surgical

## 2019-03-26 NOTE — PROGRESS NOTES
RESPIRATORY THERAPY ASSESSMENT    Name:  Barney Crowley  Medical Record Number:  [id number]  Age: 52 y.o. Gender: female  : 1965  Today's Date:  3/25/2019  Room:  5305307-01    Assessment     Is the patient being admitted for a COPD or Asthma exacerbation? No   (If yes the patient will be seen every 4 hours for the first 24 hours and then reassessed)    Patient Admission Diagnosis      Allergies  Allergies   Allergen Reactions    Sulfa Antibiotics Itching       Minimum Predicted Vital Capacity:     748          Actual Vital Capacity:      1200              Pulmonary History:COPD and Asthma  Home Oxygen Therapy:  room air  Home Respiratory Therapy:Albuterol   Current Respiratory Therapy:  Albuterol QID          Respiratory Severity Index(RSI)   Patients with orders for inhalation medications, oxygen, or any therapeutic treatment modality will be placed on Respiratory Protocol. They will be assessed with the first treatment and at least every 72 hours thereafter. The following severity scale will be used to determine frequency of treatment intervention.     Smoking History: Pulmonary Disease or Smoking History, Greater than 15 pack year = 2    Social History  Social History     Tobacco Use    Smoking status: Current Some Day Smoker     Packs/day: 1.00     Years: 30.00     Pack years: 30.00     Types: Cigarettes     Last attempt to quit: 2018     Years since quittin.9    Smokeless tobacco: Never Used    Tobacco comment: 1-2 cig/day   Substance Use Topics    Alcohol use: No     Alcohol/week: 2.4 - 3.0 oz     Types: 4 - 5 Standard drinks or equivalent per week     Comment: hx of etoh abuse, denies recent etoh use    Drug use: No     Comment: Methodone Maintance       Recent Surgical History: None = 0  Past Surgical History  Past Surgical History:   Procedure Laterality Date     SECTION  unknown    FOOT DEBRIDEMENT Right 2019    INCISION AND DRAINAGE WITH APPLICATION OF STRAVIX GRAFT RIGHT FOOT performed by Mike Neumann DPM at [address] Left 05/25/2016    I & D left foot    OTHER SURGICAL HISTORY Right 10/20/2017    RIGHT GASTROC LENGTHENING ENDOSCOPIC, INJECTION OF AMNI GRAFT    OTHER SURGICAL HISTORY Right 04/26/2018    Diabetic foot ulcer I&D w/ integra graft application    NM DEBRIDEMENT, SKIN, SUB-Q TISSUE,MUSCLE,BONE,=<20 SQ CM Right 8/17/2018    RIGHT FOOT DEBRIDEMENT INCISION AND DRAINAGE, PARTIAL 5TH RAY AMPUTATION performed by Mike Neumann DPM at [address] [facility] / [facility]  7/7003    cryotherapy done on lesion    TOE AMPUTATION Left 02/24/2017    AMPUTATION LEFT GREAT TOE                    Level of Consciousness: Alert, Oriented, and Cooperative = 0    Level of Activity: Walking with assistance = 1    Respiratory Pattern: Regular Pattern; RR 8-20 = 0    Breath Sounds: Clear = 0    Sputum   ,  ,    Cough: Strong, spontaneous, non-productive = 0    Vital Signs   BP (!) 132/40   Pulse 85   Temp 98.2 °F (36.8 °C) (Oral)   Resp 16   Ht 5' 2\" (1.575 m)   Wt 188 lb (85.3 kg)   SpO2 97%   BMI 34.39 kg/m²   SPO2 (COPD values may differ): Greater than or equal to 92% on room air = 0    Peak Flow (asthma only): not applicable = 0    RSI: 0-4 = See once and convert to home regimen or discontinue        Plan       Goals: medication delivery    Patient/caregiver was educated on the proper method of use for Respiratory Care Devices:  Yes      Level of patient/caregiver understanding able to:   ? Verbalize understanding   ? Demonstrate understanding       ? Teach back        ? Needs reinforcement       ? No available caregiver               ? Other:     Response to education:  Very Good     Is patient being placed on Home Treatment Regimen? Yes     Does the patient have everything they need prior to discharge? NA     Comments: Pt takes albuterol PRN at home, MDI to [facility] conversion. Home regimen. Plan of Care:  Albuterol Q6PRN    Electronically signed by Minence [facility] on 3/25/2019 at 8:52 PM    Respiratory Protocol Guidelines     1. Assessment and treatment by Respiratory Therapy will be initiated for medication and therapeutic interventions upon initiation of aerosolized medication. 2. Physician will be contacted for respiratory rate (RR) greater than 35 breaths per minute. Therapy will be held for heart rate (HR) greater than 140 beats per minute, pending direction from physician. 3. Bronchodilators will be administered via Metered Dose Inhaler (MDI) with spacer when the following criteria are met:  a. Alert and cooperative     b. HR < 140 bpm  c. RR < 30 bpm                d. Can demonstrate a 2-3 second inspiratory hold  4. Bronchodilators will be administered via Hand Held Nebulizer [facility]) to patients when ANY of the following criteria are met  a. Incognizant or uncooperative          b. Patients treated with HHN at Home        c. Unable to demonstrate proper use of MDI with spacer     d. RR > 30 bpm   5. Bronchodilators will be delivered via Metered Dose Inhaler (MDI), HHN, Aerogen to intubated patients on mechanical ventilation. 6. Inhalation medication orders will be delivered and/or substituted as outlined below. Aerosolized Medications Ordering and Administration Guidelines:    1. All Medications will be ordered by a physician, and their frequency and/or modality will be adjusted as defined by the patients Respiratory Severity Index (RSI) score. 2. If the patient does not have documented COPD, consider discontinuing anticholinergics when RSI is less than 9.  3. If the bronchospasm worsens (increased RSI), then the bronchodilator frequency can be increased to a maximum of every 4 hours. If greater than every 4 hours is required, the physician will be contacted.   4. If the bronchospasm improves, the frequency of the bronchodilator can be decreased, based on the patient's RSI, but not less than home treatment regimen frequency. 5. Bronchodilator(s) will be discontinued if patient has a RSI less than 9 and has received no scheduled or as needed treatment for 72  Hrs. Patients Ordered on a Mucolytic Agent:    1. Must always be administered with a bronchodilator. 2. Discontinue if patient experiences worsened bronchospasm, or secretions have lessened to the point that the patient is able to clear them with a cough. Anti-inflammatory and Combination Medications:    1. If the patient lacks prior history of lung disease, is not using inhaled anti-inflammatory medication at home, and lacks wheezing by examination or by history for at least 24 hours, contact physician for possible discontinuation.

## 2019-03-26 NOTE — PROGRESS NOTES
Internal Medicine PGY- 1 Resident Progress Note        PCP: Marissa Ascencio MD    Date of Admission: 3/25/2019    Subjective: Patient was seen this morning at bedside. She worked with ptot and felt like she was feeling slightly better than admission. She has improvement in her activity level. She tolerated food well with no nausea, vomiting. No chills overnight. No chest pain, shortness of breath. ROS is other wise negative. Medications:  Reviewed    Infusion Medications    dextrose       Scheduled Medications    sodium hypochlorite   Irrigation Daily    amitriptyline  100 mg Oral Nightly    atorvastatin  20 mg Oral Daily    furosemide  40 mg Oral BID    gabapentin  800 mg Oral 4x Daily    pantoprazole  40 mg Oral QAM AC    nicotine  1 patch Transdermal Q24H    methadone  140 mg Oral Daily    metoprolol succinate  50 mg Oral Nightly    sodium chloride flush  10 mL Intravenous 2 times per day    insulin lispro  0-18 Units Subcutaneous TID WC    insulin lispro  0-9 Units Subcutaneous Nightly    piperacillin-tazobactam  3.375 g Intravenous Q8H    vancomycin  1,250 mg Intravenous Q24H    enoxaparin  80 mg Subcutaneous BID    insulin glargine  36 Units Subcutaneous BID     PRN Meds: sodium chloride flush, magnesium hydroxide, ondansetron, glucose, dextrose, glucagon (rDNA), dextrose, albuterol      Intake/Output Summary (Last 24 hours) at 3/26/2019 0955  Last data filed at 3/26/2019 0836  Gross per 24 hour   Intake --   Output 275 ml   Net -275 ml       Physical Exam Performed:    /70   Pulse 63   Temp 97.9 °F (36.6 °C) (Oral)   Resp 16   Ht 5' 2\" (1.575 m)   Wt 188 lb (85.3 kg)   SpO2 93%   BMI 34.39 kg/m²   General appearance:  Lethargic. No apparent distress, appears stated age and cooperative. HEENT:  Normal cephalic,atraumatic without obvious deformity. Pupils equal, round, and reactive to light. Extra ocular muscles intact. Conjunctivae/corneas clear.   Neck: Supple, with full range of motion. No jugular venous distention. Trachea midline. Respiratory:  Normal respiratory effort. Bibasilar crackles. Cardiovascular:  Regular rate and rhythm with normal S1/S2 without murmurs, rubs or gallops. Abdomen: Soft, non-tender, non-distended with normal bowel sounds. Musculoskeletal:  No clubbing, cyanosis bilaterally. +1 non-pitting edema at RLE (below patella); tender to palpation. Full range of motion without deformity. Skin: Skin color, texture, turgor normal. Hyperpigmentation of RLE extending from knee down to foot. Pt has cast on LLE which obstructed exam. RLE was wrapped in ACE. Neurologic:  Neurovascularly intact without any focal sensory/motor deficits. Cranialnerves: II-XII intact, grossly non-focal.  Psychiatric:  Alert and oriented, thought content appropriate,normal insight  Capillary Refill: Brisk,< 3 seconds   Peripheral Pulses: +2 palpable, equal bilaterally           Labs:   Recent Labs     03/25/19  1210 03/26/19  0710   WBC 9.7 7.5   HGB 10.3* 10.6*   HCT 31.6* 32.0*    235     Recent Labs     03/25/19  1210 03/26/19  0710   * 137   K 4.4 4.4   CL 93* 100   CO2 30 29   BUN 17 19   CREATININE 1.5* 1.5*   CALCIUM 9.2 8.8     No results for input(s): AST, ALT, BILIDIR, BILITOT, ALKPHOS in the last 72 hours. No results for input(s): INR in the last 72 hours. No results for input(s): Evern Hendricks in the last 72 hours. Urinalysis:      Lab Results   Component Value Date    NITRU Negative 03/26/2018    WBCUA 0-2 03/26/2018    BACTERIA Rare 03/26/2018    RBCUA 3-5 03/26/2018    BLOODU TRACE-LYSED 03/26/2018    SPECGRAV 1.015 03/26/2018    GLUCOSEU >=1000 03/26/2018       Radiology:  VL Extremity Venous Right   Final Result      XR FOOT RIGHT (2 VIEWS)   Final Result      Indeterminate soft tissue swelling. Previous amputation of the fifth digit. No acute fracture.       If concern for acute injury splinting with follow-up recommended      If concern

## 2019-03-26 NOTE — PROGRESS NOTES
Pt alert and oriented, VSS. LLE with cast, RLE with ace wrap is CDI. RLE leg red, good cap refill in toes, decreased but noted sensation. Up w SBA and cane, steady gate though kyphosis present. Bed alarm on, call light within reach.

## 2019-03-26 NOTE — PROGRESS NOTES
Nutrition Assessment    Type and Reason for Visit: Initial, Positive Nutrition Screen, Patient Education    Nutrition Recommendations:   · Continue Carb Controlled diet, monitor and record all po intake   · Start [id number], Daily, monitor acceptance  · Monitor POCG and insulin regimen    Nutrition Assessment: Positive nutritional screen for pressure ulcer. Pt is nutritionally at risk d/t increased needs for DM ulcer and POCG ranging from 309-420mg/dL. Will send [id number]. Pt educated on DM diet and provided handout Planning Helathy Meals ([employer])     Malnutrition Assessment:  · Malnutrition Status: No malnutrition  · Context: Acute illness or injury  · Findings of the 6 clinical characteristics of malnutrition (Minimum of 2 out of 6 clinical characteristics is required to make the diagnosis of moderate or severe Protein Calorie Malnutrition based on AND/ASPEN Guidelines):  1. Energy Intake-(greater than 75% of estimated energy needs),      2. Weight Loss-No significant weight loss,    3. Fat Loss-No significant subcutaneous fat loss,    4. Muscle Loss-No significant muscle mass loss,    5. Fluid Accumulation-No significant fluid accumulation,    6.  Strength-Not measured    Nutrition Risk Level:  Moderate    Nutrient Needs:  · Estimated Daily Total Kcal: 0606-6726  · Estimated Daily Protein (g): 65-75  · Estimated Daily Total Fluid (ml/day): 8107-7599    Nutrition Diagnosis:   · Problem: Increased nutrient needs  · Etiology: related to Increased demand for energy/nutrients     Signs and symptoms:  as evidenced by Presence of wounds    Objective Information:  · Nutrition-Focused Physical Findings: non-pitting edema RLE; no BM recorded  · Wound Type: Diabetic Ulcer  · Current Nutrition Therapies:  · Oral Diet Orders: Carb Control 4 Carbs/Meal   · Oral Diet intake: %(per pt )  · Oral Nutrition Supplement (ONS) Orders: None  · Anthropometric Measures:  · Ht: 5' 2\" (157.5 cm)   · Current Body Wt: 188 lb (85.3 kg)  · Admission Body Wt: 188 lb (85.3 kg)  · Usual Body Wt: (pt uncertain)  · Ideal Body Wt: 110 lb (49.9 kg)   · BMI Classification: BMI 30.0 - 34.9 Obese Class I    Nutrition Interventions:   Continue current diet, Start ONS  Continued Inpatient Monitoring, Education Initiated    Nutrition Evaluation:   · Evaluation: Goals set   · Goals: pt will tolerate and consume 50% or more of all meals and supplements offered w/ POCG to remain <180mg/dL     · Monitoring: Meal Intake, Supplement Intake, Diet Tolerance, Wound Healing, Weight      Electronically signed by Ying Arriaga RD, LD on 3/26/19 at 9:50 AM    GARETH MARTELL, TERESO  Pager:  [phone]  Office:  [phone]

## 2019-03-26 NOTE — CARE COORDINATION
3/26/2019  [facility]   Clinical Case Management Department  Spoke with pt about DC plan, pt lives at home with daughter, plans to return with no needs. Pt Dad will drive her home, has DME walker and can at home, fills meds at  [facility]   Patient: Ricarda Weaver  MRN: [id number] / : 1965  ACCT: [de-identified]          Admission Documentation  Attending Provider: Wendy Mcdonough MD  Admit date/time: 3/25/2019 11:25 AM  Status: Inpatient [101]  Diagnosis: Cellulitis     Readmission within last 30 days:  no     Living Situation  Discharge Planning  Living Arrangements: Family Members  Support Systems: Family Members  Potential Assistance Needed: Outpatient PT/OT  Type of Home Care Services: None  Patient expects to be discharged to[de-identified] home   Expected Discharge Date: 19    Service Assessment       Values / Beliefs  Do you have any ethnic, cultural, sacramental, or spiritual Yazidism needs you would like us to be aware of while you are in the hospital?: Yes    Advance Directives (For Healthcare)  Pre-existing DNR Comfort Care/DNR Arrest/DNI Order: No  Healthcare Directive: No, patient does not have an advance directive for healthcare treatment  Information on Healthcare Directives Requested: Yes  Patient Requests Assistance: Yes, referral made to   Advance Directives: Documents given, Documents explained                        Destination  home with daughters KATHRYN and Victor Hugo Loverogel[address] 14   cane, walker and bedside commode    Home Health/Skilled Nursing  Services at Discharge: None  Home care at home?  No     Therapy Consults  PT evaluation needed?: Yes (Comment)  OT Evalulation Needed?: Yes (Comment)  SLP evaluation needed?: No    Home Medical Care  Plan to return home with daughters on PO ABX no needs   Pharmacy: [facility] [employer] Medications:  No  Does patient want to participate in local refill/meds to beds program?: Yes    Goals of Care  Patient expects to be discharged to[de-identified] home   Patient plans for SNF: NA         Mode of transport from hospital: private car with Dad Odessa Good     Factors facilitating achievement of predicted outcomes: Family support, Cooperative and Pleasant    Barriers to discharge: Medical complications     Thuan Thomas RN  [facility]  Case Management Department  Ph: [phone] Fax: [phone]

## 2019-03-26 NOTE — DISCHARGE SUMMARY
Hospital Medicine Discharge Summary    Patient ID: Amos Duque   Gender: female  : 1965   Age: 52 y.o. MRN: [id number]  Code Status: Full Code ***   Patient's PCP: Qasim Goodson MD    Admit Date: 3/25/2019     Discharge Date:   ***    Admitting Physician: Gaurav Griffiths MD     Discharge Physician: Denae Blunt     Discharge Diagnoses:  1) Cellulitis of R leg (infrapatellar)  2) DVT of R Gastrocnemius Vein  3) Hyperglycemia       Active Hospital Problems    Diagnosis Date Noted    Cellulitis and abscess of foot [L03.119, L02.619] 2019    Cellulitis [L03.90] 2018       The patient was seen and examined on day of discharge and this discharge summary is in conjunction with any daily progress note from day of discharge. Hospital Course:  53F presented to [facility] with concern for R leg pain since Saturday and was admitted for Cellulitis of her RLE and hyperglycemia. In the ED she had a new O2 requirement and was placed on 2L O2 NC. Her glucose on admission was 477 and was started on her home insulin glargine 30U BID. R ankle x-ray showed only soft tissue swelling and R leg doppler showed DVT totally occluding the R Gastrocnemius vein. She received Vancomycin and Zosyn in the ED which has been continued. Disposition:  {DISPOSITIONS:[id number]}    Physical Exam Performed:     /64   Pulse 69   Temp 98.8 °F (37.1 °C) (Oral)   Resp 16   Ht 5' 2\" (1.575 m)   Wt 188 lb (85.3 kg)   SpO2 93%   BMI 34.39 kg/m²       General appearance:  No apparent distress, appears stated age and cooperative. HEENT:  Normal cephalic, atraumatic without obvious deformity. Pupils equal, round, and reactive to light. Extra ocular muscles intact. Conjunctivae/corneas clear. Neck: Supple, with full range of motion. No jugular venous distention. Trachea midline. Respiratory:  Normal respiratory effort. Clear to auscultation, bilaterally without Rales/Wheezes/Rhonchi.   Cardiovascular:  Regular rate and rhythm with normal S1/S2 without murmurs, rubs or gallops. Abdomen: Soft, non-tender, non-distended with normal bowel sounds. Musculoskeletal:  No clubbing, cyanosis or edema bilaterally. Full range of motion without deformity. Skin: Skin color, texture, turgor normal.  No rashes or lesions. Neurologic:  Neurovascularly intact without any focal sensory/motor deficits. Cranial nerves: II-XII intact, grossly non-focal.  Psychiatric:  Alert and oriented, thought content appropriate, normal insight  Capillary Refill: Brisk,< 3 seconds   Peripheral Pulses: +2 palpable, equal bilaterally       Labs: For convenience and continuity at follow-up the following most recent labs are provided:      CBC:    Lab Results   Component Value Date    WBC 7.5 03/26/2019    HGB 10.6 03/26/2019    HCT 32.0 03/26/2019     03/26/2019       Renal:    Lab Results   Component Value Date     03/26/2019    K 4.4 03/26/2019     03/26/2019    CO2 29 03/26/2019    BUN 19 03/26/2019    CREATININE 1.5 03/26/2019    CALCIUM 8.8 03/26/2019    PHOS 3.5 04/23/2018         Significant Diagnostic Studies    Radiology:   VL Extremity Venous Right   Final Result      XR FOOT RIGHT (2 VIEWS)   Final Result      Indeterminate soft tissue swelling. Previous amputation of the fifth digit. No acute fracture. If concern for acute injury splinting with follow-up recommended      If concern for osteomyelitis triphasic bone scan or MRI may be useful.              Consults:     PHARMACY TO DOSE VANCOMYCIN  IP CONSULT TO PODIATRY  IP CONSULT TO HOSPITALIST  IP CONSULT TO PRIMARY CARE PROVIDER  PHARMACY TO DOSE VANCOMYCIN  IP CONSULT TO PHARMACY  IP CONSULT TO SPIRITUAL SERVICES  PHARMACY TO DOSE VANCOMYCIN  IP CONSULT TO INFECTIOUS DISEASES    Disposition:  ***     Condition at Discharge: [facility] Patient Condition:[id number]}    Discharge Instructions/Follow-up:  ***    Code Status:  Full Code ***    Activity: activity as 100 each, Refills: 5    Associated Diagnoses: Diabetic polyneuropathy associated with type 2 diabetes mellitus (HCC)      blood glucose test strips (TRUE METRIX BLOOD GLUCOSE TEST) strip 1 each by In Vitro route 2 times daily As needed. Qty: 100 each, Refills: 4      Gauze Pads & Dressings MISC Please dispense 4x8 guaze, kerlix, and ace  Qty: 1 each, Refills: 2      !! Accu-Chek Softclix Lancets MISC 1 strip by Does not apply route 2 times daily Check before breakfast and before going to bed  Qty: 100 each, Refills: 3    Associated Diagnoses: Type 2 diabetes mellitus with diabetic polyneuropathy, unspecified long term insulin use status       ! ! - Potential duplicate medications found. Please discuss with provider. Time Spent on discharge is more than {Time; 15 min - 8 hours:44255} in the examination, evaluation, counseling and review of medications and discharge plan. Signed:    Jerilyn Izaguirre   3/26/2019      Thank you Dany Marvin MD for the opportunity to be involved in this patient's care. If you have any questions or concerns please feel free to contact me at [phone]

## 2019-03-26 NOTE — PROGRESS NOTES
Occupational Therapy   Occupational Therapy Initial Assessment and Treatment Note   Date: 3/26/2019   Patient Name: You Harmon  MRN: [id number]     : 1965    Date of Service: 3/26/2019    Discharge Recommendations:  You Harmon scored a  on the -PAC ADL Inpatient form. At this time, no further OT is recommended upon discharge. Recommend patient returns to prior setting with prior services. OT Equipment Recommendations  Equipment Needed: No    Assessment   Assessment: Pt from home - Pt appears at functional baseline. Pt has family  as needed. Pt wih no acute OT needs. No DME needs. Will sign off from OT services. Pt edu / encouraged to be OOB/up with RN staff. Decision Making: Low Complexity  Patient Education: Role of OT / home safety/ activity promotion - verb understanding   No Skilled OT: Safe to return home; No OT goals identified; At baseline function  REQUIRES OT FOLLOW UP: No  Activity Tolerance  Activity Tolerance: Patient Tolerated treatment well  Safety Devices  Safety Devices in place: Yes  Type of devices: Call light within reach; Left in chair;Chair alarm in place;Nurse notified           Patient Diagnosis(es): The primary encounter diagnosis was Right leg pain. A diagnosis of Acute deep vein thrombosis (DVT) of other specified vein of right lower extremity (HCC) was also pertinent to this visit.      has a past medical history of Amenorrhea, Anomalies of nails, Asthma, Athlete's foot, Bacterial vaginosis, Carpal tunnel syndrome, COPD (chronic obstructive pulmonary disease) ([address] Diabetes mellitus type II, Diabetic neuropathy ([facility] [address] DVT (deep venous thrombosis) ([address] Dyslipidemia, Dyspareunia, ETOH abuse, Feet clawing, HTN (hypertension), Hx of blood clots, Hyperlipidemia, MRSA (methicillin resistant staph aureus) culture positive, Neuropathy, Pain, back, Pain, eye, right, Pancreatitis, Pseudocyst, pancreas, Scalp lesion, Tinea pedis, Tobacco abuse, and Vaginal No  Ambulation Assistance: Independent  Transfer Assistance: Independent  Active : Yes  Occupation: On disability  Additional Comments: no falls; daughter reports pt is \"stubborn\"       Objective   Vision: Within Functional Limits  Hearing: Within functional limits    Orientation  Overall Orientation Status: Within Functional Limits(sleepy at times )     Standing Balance  Sit to stand: Stand by assistance  Stand to sit: Stand by assistance  Toilet Transfers  Toilet - Technique: Ambulating  Equipment Used: Standard toilet  Toilet Transfer: Modified independent  Toilet Transfers Comments: with rail -- has dresser/ window sill at home   ADL  Feeding: Independent  Grooming: Independent  LE Dressing: Contact guard assistance  Toileting: Independent     Transfers  Sit to stand: Stand by assistance  Stand to sit: Stand by assistance  Vision - Basic Assessment  Prior Vision: Wears glasses all the time  Cognition  Overall Cognitive Status: WFL  Cognition Comment: sleepy at times     Sensation  Overall Sensation Status: (impaired B LE)    LUE AROM (degrees)  LUE AROM : WFL  Left Hand AROM (degrees)  Left Hand AROM: WFL  RUE AROM (degrees)  RUE AROM : WFL  Right Hand AROM (degrees)  Right Hand AROM: WFL  LUE Strength  Gross LUE Strength: WFL  L Hand Grasp: 4/5  L Hand Release: 4/5  RUE Strength  Gross RUE Strength: WFL  R Hand Grasp: 4/5  R Hand Release: 4/5     Plan D/c Acute OT services.  Home with family at d/c     AM-PAC Score  AM-[facility] Inpatient Daily Activity Raw Score: 22  AM-PAC Inpatient ADL T-Scale Score : 47.1  ADL Inpatient CMS 0-100% Score: 25.8  ADL Inpatient CMS G-Code Modifier : CJ      Therapy Time   Individual Concurrent Group Co-treatment   Time In [facility]         Time Out 1051         Minutes 44              Timed Code Treatment Minutes:   25 mins     Total Treatment Minutes:  44 mins       Lazarus Hoit, OT

## 2019-03-26 NOTE — PROGRESS NOTES
Internal Medicine PGY-*** Resident Progress Note        PCP: Jacqueline Taylor MD    Date of Admission: 3/25/2019    Chief Complaint: R LE pain     Subjective: Pt was sitting upright, comfortable in bed, awake and oriented. She slept well over night without concern. Her headache and constipation reported yesterday have both resolved, however her RLE is . She is able to ambulate with the assistance of a cane. Review of Systems   Constitutional: Negative for chills, fatigue and fever. HENT: Negative for tinnitus. Eyes: Negative for visual disturbance. Respiratory: Negative for cough, chest tightness and shortness of breath. Cardiovascular: Negative for chest pain and palpitations. Gastrointestinal: Negative for blood in stool, constipation, diarrhea, nausea and vomiting. Genitourinary: Negative for difficulty urinating. Skin: Positive for color change and wound. Neurological: Negative for dizziness, syncope, numbness and headaches.          Medications:  Reviewed    Infusion Medications    dextrose       Scheduled Medications    amitriptyline  100 mg Oral Nightly    atorvastatin  20 mg Oral Daily    furosemide  40 mg Oral BID    gabapentin  800 mg Oral 4x Daily    pantoprazole  40 mg Oral QAM AC    nicotine  1 patch Transdermal Q24H    methadone  140 mg Oral Daily    metoprolol succinate  50 mg Oral Nightly    sodium chloride flush  10 mL Intravenous 2 times per day    insulin lispro  0-18 Units Subcutaneous TID WC    insulin lispro  0-9 Units Subcutaneous Nightly    piperacillin-tazobactam  3.375 g Intravenous Q8H    vancomycin  1,250 mg Intravenous Q24H    enoxaparin  80 mg Subcutaneous BID    insulin glargine  36 Units Subcutaneous BID     PRN Meds: sodium chloride flush, magnesium hydroxide, ondansetron, glucose, dextrose, glucagon (rDNA), dextrose, albuterol      Intake/Output Summary (Last 24 hours) at 3/26/2019 1331  Last data filed at 3/26/2019 0836  Gross per 24

## 2019-03-26 NOTE — CONSULTS
Infectious Diseases Inpatient Consult Note    Medical Student note - reviewed and modified, see Attending addendum at bottom    Reason for Consult:   R Leg Pain   Requesting Physician:   Dr. Emory Rod   Primary Care Physician:  Brennan Coats MD  History Obtained From:   Pt, EPIC    Admit Date: 3/25/2019  Hospital Day: 2    CHIEF COMPLAINT:       Chief Complaint   Patient presents with    Leg Pain     right, r/o blood clot       HISTORY OF PRESENT ILLNESS:      53 yo F w/ hx of uncontrolled DM Type II, chronic bilateral lower extremity ulcers, MRSA infection (2017), COPD, asthma, HLD who presented with 1.5d hx of worsening R leg warmth, redness and tenderness below knee. She denied fever and chills. As per patient, she receives routine care at outpatient Podiatry clinic for her chronic ulcers. Last seen at clinic on 03/22 when the cast on R leg wound was removed. Over the weekend patient \"walked on the bare foot and drove without protection more than she should have\" that led to her presenting symptoms. Was seen at outpatient clinic the day prior to admission by Dr. Heaven Walters and Dr. Dustin Vazquez who asked her to come to the ED. Denied fevers, chills, chest pain, shortness of breath, n/v/d or constipation. 03/25/2019 ED: Evaluated by Podiatry who noted the R foot wounds had re-opened since 03/22 and were concerned about cellulitis vs. DVT from the presentation. Patient was afebrile, normotensive, HR 80 and was put on 2L NC. WBC 9.7, , ESR 94, CRP 94.4, Cr 1.5 ad was started on Vanc/Zosyn. R ankle x-ray showed soft tissue swelling, no fracture.  R leg doppler showed DVT acutely totally occluding gastrocnemius vein.     Past Medical History:     Past Medical History:   Diagnosis Date    Amenorrhea     Anomalies of nails     Asthma 5/14/04    Athlete's foot 8/2010    Bacterial vaginosis 04/2008    Carpal tunnel syndrome 5/2007    COPD (chronic obstructive pulmonary disease) ([facility] [address]     Diabetes mellitus type II 2007    Diabetic neuropathy ([facility] [address] 8/10    DVT (deep venous thrombosis) ([facility] [address] 3/2004    Dyslipidemia 2009    Dyspareunia 2009    ETOH abuse 3/04/2007    Feet clawing     HTN (hypertension)     Hx of blood clots     Hyperlipidemia     MRSA (methicillin resistant staph aureus) culture positive 2017; 2017    foot; leg     Neuropathy 2009    polyneuropathy    Pain, back 2008    Pain, eye, right 04    Pancreatitis 04    Pseudocyst, pancreas 04    Scalp lesion 2007    Tinea pedis     Tobacco abuse 2008    Vaginal bleeding, abnormal 2007       Past Surgical History:    Past Surgical History:   Procedure Laterality Date     SECTION  unknown    FOOT DEBRIDEMENT Right 2019    INCISION AND DRAINAGE WITH APPLICATION OF STRAVIX GRAFT RIGHT FOOT [facility] Sapphire [facility] D[facility] [address] [facility]    I & D left foot    OTHER SURGICAL HISTORY Right 10/20/2017    RIGHT GASTROC LENGTHENING ENDOSCOPIC, INJECTION OF AMNI GRAFT    OTHER SURGICAL HISTORY Right 2018    Diabetic foot ulcer I&D w/ integra graft application    KS DEBRIDEMENT, SKIN, SUB-Q TISSUE,MUSCLE,BONE,=<20 SQ CM Right 2018    RIGHT FOOT DEBRIDEMENT INCISION AND DRAINAGE, PARTIAL 5TH RAY AMPUTATION performed by Sapphire Santoyo DPM at [address] [facility] [address] / [address]  [address]    cryotherapy done on lesion    TOE AMPUTATION Left 2017    AMPUTATION LEFT GREAT TOE                    Current Medications:     amitriptyline  100 mg Oral Nightly    atorvastatin  20 mg Oral Daily    furosemide  40 mg Oral BID    gabapentin  800 mg Oral 4x Daily    pantoprazole  40 mg Oral QAM AC    nicotine  1 patch Transdermal Q24H    methadone  140 mg Oral Daily    metoprolol succinate  50 mg Oral Nightly    sodium chloride flush  10 mL Intravenous 2 times per day    insulin lispro  0-18 Units Subcutaneous TID WC    insulin lispro  0-9 Units Subcutaneous Nightly    piperacillin-tazobactam  3.375 g Intravenous Q8H    vancomycin  1,250 mg Intravenous Q24H    enoxaparin  80 mg Subcutaneous BID    insulin glargine  36 Units Subcutaneous BID       Allergies:  Sulfa antibiotics    Social History:    TOBACCO:    3 ciggarettes/day; 40 year history  ETOH:    None  DRUGS:   None  MARITAL STATUS:   Single    Patient lives in a house in [place]. Family History:   No immunodeficiency    REVIEW OF SYSTEMS:    No fever / chills / sweats. No weight loss. No visual change, eye pain, eye discharge. No oral lesion, sore throat, dysphagia. Denies cough / sputum. Denies chest pain, palpitations. Denies n / v / abd pain. No diarrhea. Denies dysuria or change in urinary function. Reports R leg pain  Denies skin changes, itching  Denies focal weakness, sensory change or other neurologic symptom    Denies new / worse depression, psychiatric symptoms  Denies any Endocrine or Hematologic symptoms    PHYSICAL EXAM:      Vitals:    /70   Pulse 63   Temp 97.9 °F (36.6 °C) (Oral)   Resp 16   Ht 5' 2\" (1.575 m)   Wt 188 lb (85.3 kg)   SpO2 93%   BMI 34.39 kg/m²     GENERAL: No apparent distress. HEENT: Membranes moist, no oral lesion  NECK:  Supple  LUNGS: Clear b/l, no rales, no dullness  CARDIAC: RRR, no murmur appreciated  ABD:  + BS, soft / NT  EXT:  +1 non-pitting edema at RLE (below patella); tender to palpation. Hyperpigmentation noted on R leg from knee to foot. RLE wrapped in ACE bandage.  Amputation of RLE 5th digit and 1st digit LLE   NEURO: No focal neurologic findings  PSYCH: Orientation, sensorium, mood normal  LINES:  Peripheral iv    DATA:    Lab Results   Component Value Date    WBC 7.5 03/26/2019    HGB 10.6 (L) 03/26/2019    HCT 32.0 (L) 03/26/2019    MCV 87.0 03/26/2019     03/26/2019     Lab Results   Component Value Date    CREATININE 1.5 (H) 03/26/2019    BUN 19 03/26/2019     03/26/2019    K 4.4 2019     2019    CO2 29 2019       Hepatic Function Panel:   Lab Results   Component Value Date    ALKPHOS 211 2019    ALT 23 2019    AST 26 2019    PROT 6.8 2019    PROT 6.6 2011    BILITOT <0.2 2019    BILIDIR <0.2 2018    IBILI see below 2018    LABALBU 3.4 2019       Micro:  2019 Blood Cultures X 2 NGTD  2019 Wound culture Pending   2019 Gram Stain 1+ WBCs, 4+Gram + cocci     Imagin2019 Xray R foot   Indeterminate soft tissue swelling. 2019 R VLE    Acute totally occluding deep vein thrombosis involving the right   gastrocnemius vein. IMPRESSION:      47yo F w/ hx of chronic foot ulcers 2/2 uncontrolled DM II and neuropathy presented with R leg pain. , redness and swelling and being evaluated for DVT and possible cellulitis. Wound cx 2019 grew Group B beta-hemolytic strep agalactiae and gram stain showed gram positive cocci. She has a history of MRSA in 2017. Will send swabs for sensitivities. Patient afebrile with WBC of 7.5 and VS WNL. Put on empiric Vanc/Zosyn treatment by ED on admission 2019    RECOMMENDATIONS:    - Switch to Ampicillin   - f/u blood cultures  - Wound care and dressing change     Discussed with Dr. Blas Corral MS4    Addendum to Medical Student Consult note:  Pt seen,examined and evaluated. I have independently performed history, physical exam, lab and data review. I have determined assessment and plan as documented by student (Mely Fitch). 46 yo woman with hx obesity, DM, neuropathy.    Pt had L partial hallux amputation 2017.  L foot wound 10/2017 MRSA, E faecalis.     Admit 18 with R foot infection. Wound cult + light mixed skin sukhjinder, heavy E cloacae, moderate E coli    OR , R partial 5th ray resectoin.  Discharged on iv ertapenem / po linezolid x 6 weeks.(through 18)     Admit 19 with R leg cellulitis.   Cult +

## 2019-03-26 NOTE — FLOWSHEET NOTE
03/26/19 1354   Encounter Summary   Services provided to: Patient   Referral/Consult From: Patient;Nurse   Continue Visiting   (adv dir ed 3/26 TJR)   Complexity of Encounter Moderate   Length of Encounter 30 minutes   Advance Directives (For Healthcare)   Advance Directives Documents given;Documents explained   AD/POA Ed completed.  Documents explanatory information and contact information left with patient, who will contact us (via RN, \"0\" ask for a )  if further questions or if they wish to complete.     Electronically signed by Devendra Reynoso on 3/26/2019 at 1:57 PM

## 2019-03-27 LAB
ANION GAP SERPL CALCULATED.3IONS-SCNC: 14 MMOL/L (ref 3–16)
BASOPHILS ABSOLUTE: 0 K/UL (ref 0–0.2)
BASOPHILS RELATIVE PERCENT: 0.4 %
BUN BLDV-MCNC: 20 MG/DL (ref 7–20)
CALCIUM SERPL-MCNC: 8.8 MG/DL (ref 8.3–10.6)
CHLORIDE BLD-SCNC: 100 MMOL/L (ref 99–110)
CO2: 26 MMOL/L (ref 21–32)
CREAT SERPL-MCNC: 1.5 MG/DL (ref 0.6–1.1)
EOSINOPHILS ABSOLUTE: 0.3 K/UL (ref 0–0.6)
EOSINOPHILS RELATIVE PERCENT: 4.4 %
GFR AFRICAN AMERICAN: 44
GFR NON-AFRICAN AMERICAN: 36
GLUCOSE BLD-MCNC: 124 MG/DL (ref 70–99)
GLUCOSE BLD-MCNC: 142 MG/DL (ref 70–99)
GLUCOSE BLD-MCNC: 181 MG/DL (ref 70–99)
GLUCOSE BLD-MCNC: 182 MG/DL (ref 70–99)
GLUCOSE BLD-MCNC: 202 MG/DL (ref 70–99)
GLUCOSE BLD-MCNC: 230 MG/DL (ref 70–99)
GLUCOSE BLD-MCNC: 262 MG/DL (ref 70–99)
GLUCOSE BLD-MCNC: 297 MG/DL (ref 70–99)
GLUCOSE BLD-MCNC: 324 MG/DL (ref 70–99)
GLUCOSE BLD-MCNC: 327 MG/DL (ref 70–99)
GLUCOSE BLD-MCNC: 50 MG/DL (ref 70–99)
HCT VFR BLD CALC: 33.5 % (ref 36–48)
HEMOGLOBIN: 10.9 G/DL (ref 12–16)
LYMPHOCYTES ABSOLUTE: 2.5 K/UL (ref 1–5.1)
LYMPHOCYTES RELATIVE PERCENT: 37.9 %
MCH RBC QN AUTO: 28.8 PG (ref 26–34)
MCHC RBC AUTO-ENTMCNC: 32.6 G/DL (ref 31–36)
MCV RBC AUTO: 88.4 FL (ref 80–100)
MONOCYTES ABSOLUTE: 0.4 K/UL (ref 0–1.3)
MONOCYTES RELATIVE PERCENT: 6.2 %
NEUTROPHILS ABSOLUTE: 3.4 K/UL (ref 1.7–7.7)
NEUTROPHILS RELATIVE PERCENT: 51.1 %
PDW BLD-RTO: 16.1 % (ref 12.4–15.4)
PERFORMED ON: ABNORMAL
PLATELET # BLD: 262 K/UL (ref 135–450)
PMV BLD AUTO: 8.7 FL (ref 5–10.5)
POTASSIUM REFLEX MAGNESIUM: 4.4 MMOL/L (ref 3.5–5.1)
RBC # BLD: 3.79 M/UL (ref 4–5.2)
SODIUM BLD-SCNC: 140 MMOL/L (ref 136–145)
WBC # BLD: 6.7 K/UL (ref 4–11)

## 2019-03-27 PROCEDURE — 2580000003 HC RX 258: Performed by: STUDENT IN AN ORGANIZED HEALTH CARE EDUCATION/TRAINING PROGRAM

## 2019-03-27 PROCEDURE — 6360000002 HC RX W HCPCS: Performed by: FAMILY MEDICINE

## 2019-03-27 PROCEDURE — 6370000000 HC RX 637 (ALT 250 FOR IP): Performed by: STUDENT IN AN ORGANIZED HEALTH CARE EDUCATION/TRAINING PROGRAM

## 2019-03-27 PROCEDURE — 85025 COMPLETE CBC W/AUTO DIFF WBC: CPT

## 2019-03-27 PROCEDURE — 99232 SBSQ HOSP IP/OBS MODERATE 35: CPT | Performed by: INTERNAL MEDICINE

## 2019-03-27 PROCEDURE — 96372 THER/PROPH/DIAG INJ SC/IM: CPT

## 2019-03-27 PROCEDURE — 96366 THER/PROPH/DIAG IV INF ADDON: CPT

## 2019-03-27 PROCEDURE — 6360000002 HC RX W HCPCS: Performed by: STUDENT IN AN ORGANIZED HEALTH CARE EDUCATION/TRAINING PROGRAM

## 2019-03-27 PROCEDURE — 80048 BASIC METABOLIC PNL TOTAL CA: CPT

## 2019-03-27 PROCEDURE — 1200000000 HC SEMI PRIVATE

## 2019-03-27 PROCEDURE — 36415 COLL VENOUS BLD VENIPUNCTURE: CPT

## 2019-03-27 RX ORDER — METOPROLOL SUCCINATE 50 MG/1
50 TABLET, EXTENDED RELEASE ORAL NIGHTLY
Qty: 30 TABLET | Refills: 3 | Status: SHIPPED | OUTPATIENT
Start: 2019-03-27 | End: 2019-08-22 | Stop reason: SDUPTHER

## 2019-03-27 RX ORDER — AMOXICILLIN AND CLAVULANATE POTASSIUM 875; 125 MG/1; MG/1
1 TABLET, FILM COATED ORAL 2 TIMES DAILY
Qty: 20 TABLET | Refills: 0 | Status: SHIPPED | OUTPATIENT
Start: 2019-03-27 | End: 2019-03-27 | Stop reason: SDUPTHER

## 2019-03-27 RX ORDER — AMOXICILLIN AND CLAVULANATE POTASSIUM 875; 125 MG/1; MG/1
1 TABLET, FILM COATED ORAL 2 TIMES DAILY
Qty: 20 TABLET | Refills: 0 | Status: SHIPPED | OUTPATIENT
Start: 2019-03-27 | End: 2019-03-28 | Stop reason: SDUPTHER

## 2019-03-27 RX ADMIN — FUROSEMIDE 40 MG: 40 TABLET ORAL at 08:37

## 2019-03-27 RX ADMIN — FUROSEMIDE 40 MG: 40 TABLET ORAL at 16:54

## 2019-03-27 RX ADMIN — METHADONE HYDROCHLORIDE 140 MG: 10 TABLET ORAL at 08:38

## 2019-03-27 RX ADMIN — Medication 10 ML: at 10:51

## 2019-03-27 RX ADMIN — GABAPENTIN 800 MG: 400 CAPSULE ORAL at 16:55

## 2019-03-27 RX ADMIN — INSULIN LISPRO 9 UNITS: 100 INJECTION, SOLUTION INTRAVENOUS; SUBCUTANEOUS at 08:33

## 2019-03-27 RX ADMIN — Medication 10 ML: at 21:59

## 2019-03-27 RX ADMIN — INSULIN LISPRO 5 UNITS: 100 INJECTION, SOLUTION INTRAVENOUS; SUBCUTANEOUS at 08:34

## 2019-03-27 RX ADMIN — AMPICILLIN SODIUM AND SULBACTAM SODIUM 1.5 G: 1; .5 INJECTION, POWDER, FOR SOLUTION INTRAMUSCULAR; INTRAVENOUS at 06:08

## 2019-03-27 RX ADMIN — PANTOPRAZOLE SODIUM 40 MG: 40 TABLET, DELAYED RELEASE ORAL at 06:09

## 2019-03-27 RX ADMIN — INSULIN GLARGINE 36 UNITS: 100 INJECTION, SOLUTION SUBCUTANEOUS at 08:33

## 2019-03-27 RX ADMIN — ENOXAPARIN SODIUM 80 MG: 80 INJECTION SUBCUTANEOUS at 00:00

## 2019-03-27 RX ADMIN — AMPICILLIN SODIUM AND SULBACTAM SODIUM 1.5 G: 1; .5 INJECTION, POWDER, FOR SOLUTION INTRAMUSCULAR; INTRAVENOUS at 10:54

## 2019-03-27 RX ADMIN — AMITRIPTYLINE HYDROCHLORIDE 100 MG: 50 TABLET, FILM COATED ORAL at 21:51

## 2019-03-27 RX ADMIN — ATORVASTATIN CALCIUM 20 MG: 20 TABLET, FILM COATED ORAL at 08:37

## 2019-03-27 RX ADMIN — AMPICILLIN SODIUM AND SULBACTAM SODIUM 1.5 G: 1; .5 INJECTION, POWDER, FOR SOLUTION INTRAMUSCULAR; INTRAVENOUS at 21:52

## 2019-03-27 RX ADMIN — INSULIN LISPRO 5 UNITS: 100 INJECTION, SOLUTION INTRAVENOUS; SUBCUTANEOUS at 14:16

## 2019-03-27 RX ADMIN — GABAPENTIN 800 MG: 400 CAPSULE ORAL at 21:52

## 2019-03-27 RX ADMIN — METOPROLOL SUCCINATE 50 MG: 50 TABLET, EXTENDED RELEASE ORAL at 21:51

## 2019-03-27 RX ADMIN — INSULIN LISPRO 5 UNITS: 100 INJECTION, SOLUTION INTRAVENOUS; SUBCUTANEOUS at 17:06

## 2019-03-27 RX ADMIN — GABAPENTIN 800 MG: 400 CAPSULE ORAL at 08:38

## 2019-03-27 RX ADMIN — ENOXAPARIN SODIUM 80 MG: 80 INJECTION SUBCUTANEOUS at 08:38

## 2019-03-27 RX ADMIN — AMPICILLIN SODIUM AND SULBACTAM SODIUM 1.5 G: 1; .5 INJECTION, POWDER, FOR SOLUTION INTRAMUSCULAR; INTRAVENOUS at 16:56

## 2019-03-27 RX ADMIN — APIXABAN 10 MG: 5 TABLET, FILM COATED ORAL at 21:52

## 2019-03-27 RX ADMIN — GABAPENTIN 800 MG: 400 CAPSULE ORAL at 14:18

## 2019-03-27 ASSESSMENT — PAIN SCALES - GENERAL
PAINLEVEL_OUTOF10: 2

## 2019-03-27 ASSESSMENT — PAIN DESCRIPTION - ONSET
ONSET: ON-GOING

## 2019-03-27 ASSESSMENT — PAIN DESCRIPTION - PAIN TYPE
TYPE: CHRONIC PAIN

## 2019-03-27 ASSESSMENT — PAIN DESCRIPTION - FREQUENCY
FREQUENCY: CONTINUOUS

## 2019-03-27 ASSESSMENT — PAIN DESCRIPTION - DESCRIPTORS
DESCRIPTORS: ACHING
DESCRIPTORS: ACHING;DISCOMFORT
DESCRIPTORS: ACHING

## 2019-03-27 ASSESSMENT — PAIN DESCRIPTION - LOCATION: LOCATION: OTHER (COMMENT)

## 2019-03-27 ASSESSMENT — PAIN - FUNCTIONAL ASSESSMENT
PAIN_FUNCTIONAL_ASSESSMENT: PREVENTS OR INTERFERES SOME ACTIVE ACTIVITIES AND ADLS

## 2019-03-27 ASSESSMENT — PAIN DESCRIPTION - ORIENTATION: ORIENTATION: RIGHT;LEFT

## 2019-03-27 ASSESSMENT — PAIN DESCRIPTION - PROGRESSION
CLINICAL_PROGRESSION: NOT CHANGED

## 2019-03-27 NOTE — PROGRESS NOTES
Pt alert and oriented. VSS. Pt ambulates with a steady gait and SBA. Pt daughter is at bedside. The smell of \"marijuana\" was lingering from patients room. Pt was confronted and denied any smoking in her room. Pt was made aware that security can be called if we suspect pt is smoking in her room. Pt was educated on policy of no smoking in rooms and was told that door must remain open. Pt stated understanding. Pt is in bed with wheels locked, bed is in the lowest position with wheels locked. Call light within reach.  Will continue to monitor

## 2019-03-27 NOTE — PROGRESS NOTES
Patient A&O x4, VSS, tolerating diet, pain in RLE has decreased, dressing changed by podiatry, up with a walker to bathroom, ambulates well, no difficulty voiding, educated on importance of carb control diet, in bed with alarm activated, calls out appropriately. Will continue to monitor.

## 2019-03-27 NOTE — PROGRESS NOTES
ID Follow-up NOTE    CC:   R foot ulceration, R foot cellulitis  Antibiotics: Unasyn    Admit Date: 3/25/2019  Hospital Day: 3    Subjective:     Patient reports she feel good -   R foot hurts with palpation, dressing change      Objective:     Patient Vitals for the past 8 hrs:   BP Temp Temp src Pulse Resp SpO2   03/27/19 1103 134/72 98 °F (36.7 °C) Oral 76 18 93 %   03/27/19 0745 (!) 153/94 98.7 °F (37.1 °C) Oral 70 18 96 %     No intake/output data recorded. No intake/output data recorded. EXAM:  GENERAL: No apparent distress. HEENT: Membranes moist, no oral lesion  NECK:  Supple  LUNGS: Clear b/l, no rales, no dullness  CARDIAC: RRR, no murmur appreciated  ABD:  + BS, soft / NT  EXT:  L LE cast, R LE with dressing / ACE  NEURO: No focal neurologic findings  PSYCH: Orientation, sensorium, mood normal  LINES:  Peripheral iv       Data Review:  Lab Results   Component Value Date    WBC 6.7 03/27/2019    HGB 10.9 (L) 03/27/2019    HCT 33.5 (L) 03/27/2019    MCV 88.4 03/27/2019     03/27/2019     Lab Results   Component Value Date    CREATININE 1.5 (H) 03/27/2019    BUN 20 03/27/2019     03/27/2019    K 4.4 03/27/2019     03/27/2019    CO2 26 03/27/2019       Hepatic Function Panel:   Lab Results   Component Value Date    ALKPHOS 211 01/04/2019    ALT 23 01/04/2019    AST 26 01/04/2019    PROT 6.8 01/04/2019    PROT 6.6 07/21/2011    BILITOT <0.2 01/04/2019    BILIDIR <0.2 03/27/2018    IBILI see below 03/27/2018    LABALBU 3.4 01/04/2019       MICRO:  3/25 BC x 2 no growth to date  3/25 Wound: GS 1+WBC, 4+GPC; cult     IMAGING:  3/25 R foot x-ray: STS  3/25 Doppler   \"Acute totally occluding deep vein thrombosis involving the right gastrocnemius vein. \"    Scheduled Meds:   insulin glargine  40 Units Subcutaneous BID    ampicillin-sulbactam  1.5 g Intravenous Q6H    insulin lispro  5 Units Subcutaneous TID WC    amitriptyline  100 mg Oral Nightly    atorvastatin  20 mg Oral Daily   

## 2019-03-27 NOTE — PROGRESS NOTES
Conjunctivae/corneas clear. Neck: Supple, with full range of motion. No jugular venous distention. Trachea midline. Respiratory:  Normal respiratory effort. Clear to auscultation, bilaterally without Rales/Wheezes/Rhonchi. Cardiovascular: Regular rate and rhythm with normal S1/S2 without murmurs, rubs or gallops. Abdomen: Soft, non-tender, non-distended with normal bowel sounds. Musculoskeletal: LLE is in a cast.  RLE shows discoloration with mild tenderness to palpation. 1+ pitting edema. Full range of motion without deformity. No weakness. Skin: Plantar ulcer on the right foot with no surrounding erythema or purulent discharge. Skin is discolored overlying RLE, but not warm to the touch. Neurologic:  Neurovascularly intact without any focal sensory/motor deficits. Cranial nerves: II-XII intact, grossly non-focal.  Psychiatric: Alert and oriented, thought content appropriate, normal insight  Peripheral Pulses: +2 palpable, equal bilaterally       Labs:   Recent Labs     03/25/19  1210 03/26/19  0710 03/27/19  0702   WBC 9.7 7.5 6.7   HGB 10.3* 10.6* 10.9*   HCT 31.6* 32.0* 33.5*    235 262     Recent Labs     03/25/19  1210 03/26/19  0710 03/27/19  0701   * 137 140   K 4.4 4.4 4.4   CL 93* 100 100   CO2 30 29 26   BUN 17 19 20   CREATININE 1.5* 1.5* 1.5*   CALCIUM 9.2 8.8 8.8     No results for input(s): AST, ALT, BILIDIR, BILITOT, ALKPHOS in the last 72 hours. No results for input(s): INR in the last 72 hours. No results for input(s): Evern Tarrant in the last 72 hours. Urinalysis:      Lab Results   Component Value Date    NITRU Negative 03/26/2018    WBCUA 0-2 03/26/2018    BACTERIA Rare 03/26/2018    RBCUA 3-5 03/26/2018    BLOODU TRACE-LYSED 03/26/2018    SPECGRAV 1.015 03/26/2018    GLUCOSEU >=1000 03/26/2018       Radiology:  VL Extremity Venous Right   Final Result      XR FOOT RIGHT (2 VIEWS)   Final Result      Indeterminate soft tissue swelling.       Previous amputation of on methadone   - confirmed dose at clinic      Hx of HTN  - BP normotensive on admission  - Resume home Metoprolol Succinate 50mg PO qHs  - Resume home Lasix 40mg PO BID     Hx of DDD  - Resume home Amitriptyline 100mg PO qHs for pain control  - Resume home Gabapentin 800mg PO 4x/day     Hx of HLD  - Resume home Lipitor 20mg PO qD     Smoking Hx  - Nicotine patch       DVT Prophylaxis: Lovenox 80 BID  Diet: DIET CARB CONTROL; Dietary Nutrition Supplements: Protein Modular  Code Status: Full Code   PT/OT Eval Status: You Harmon scored a 20/24 on the AM-PAC short mobility form and 22 /24 on the AM-PAC ADL Inpatient form. No further needs on discharge    Dispo - Patient was to be discharged today as her cellulitis has improved significantly and plan to transition to PO antibiotics on discharge per ID. However, glucose dropped to 50 this am.  Have discontinued sliding scale and will monitor inpatient with adjustments to insulin regimen as above.     I have discussed the patient with the senior resident and MD Lenora Abreu MD  Internal Medicine Resident PGY-1  Perfect Serve

## 2019-03-27 NOTE — PROGRESS NOTES
hydroxide (MILK OF MAGNESIA) 400 MG/5ML suspension 30 mL, 30 mL, Oral, Daily PRN  ondansetron (ZOFRAN) injection 4 mg, 4 mg, Intravenous, Q6H PRN  glucose (GLUTOSE) 40 % oral gel 15 g, 15 g, Oral, PRN  dextrose 50 % solution 12.5 g, 12.5 g, Intravenous, PRN  glucagon (rDNA) injection 1 mg, 1 mg, Intramuscular, PRN  dextrose 5 % solution, 100 mL/hr, Intravenous, PRN  albuterol (PROVENTIL) nebulizer solution 2.5 mg, 2.5 mg, Nebulization, Q6H PRN  enoxaparin (LOVENOX) injection 80 mg, 80 mg, Subcutaneous, BID  Allergies:   Sulfa antibiotics  Social History:    Heavy smoker and hx of IVDU currently on methadone for several years now. Family History:   History reviewed. No pertinent family history. REVIEW OF SYSTEMS:    See HPI   PHYSICAL EXAM:      Vitals:    /72   Pulse 76   Temp 98 °F (36.7 °C) (Oral)   Resp 18   Ht 5' 2\" (1.575 m)   Wt 188 lb (85.3 kg)   SpO2 93%   BMI 34.39 kg/m²     LABS:   Recent Labs     03/26/19  0710 03/27/19  0702   WBC 7.5 6.7   HGB 10.6* 10.9*   HCT 32.0* 33.5*    262     Recent Labs     03/27/19  0701      K 4.4      CO2 26   BUN 20   CREATININE 1.5*     No results for input(s): PROT, INR, APTT in the last 72 hours. SKIN:    RLE is noted for hemosiderin deposits circumfrencially to the calf. The plantar aspect of the foot is no longer erythematous ,  sub 4th metatarsal where there is a full thickness wound of 2cm diameter with granular base and no fibrotic tissue, no abraham wound maceration as well. Otherwise skin is xerotic, no fluctuance, no mal odor. LLE is in a TCC and will remain until Friday. NEUROLOGIC:    Complete loss of protective sensation and epicritic sensation. VASCULAR:    DP/PT 1/4 RLE, cap refill instant. MUSCULOSKELETAL:  RLE s/p 5th digit and partial ray amputation, otherwise rectus foot with 5/5 strength in all quadrants. IMPRESSION/RECOMMENDATIONS:      1. Diabetic foot ulcer RLE Carpenter grade 1     2.  Mild cellulitis of R lateral foot resolved. 3. DVT RLE     4. DFU of LLE currently in TCC. - patient seen bedside, discussed likely etiology of condition and treatment plan. - no leukocytosis, ESR elevated at baseline, CRP elevated likely related to DVT. - dressed RLE in wet to dry with DSD, ACE for compression to tibial tuberosity.   - LLE TCC to be left in place until Friday then replaced.   - NON  WB to RLE, WBAT to LLE. - consult placed to ID recs for augmentin at D/C     DISPO: DVT of RLE in setting of mild cellulitis of R foot DFU, patient ok for d/c from podiatry standpoint, ID consulted rec for PO Augmentin script at D/C , then patient will f/u outpatient at the resident clinic.        - The patient will be staffed with Dr. Manav Kelly, DPM   Podiatric Resident, PGY-2  Pager: [phone]  3/27/2019, 12:56 PM

## 2019-03-27 NOTE — CARE COORDINATION
CM following DC plan, spoke with Dr Alonso Mitchell resident, he plans to sent pt home tomorrow but she will need anti coagulation and request Iain, advised to do meds to beds to see cost or if needs prior auth today so we can have this resolved early or change the med if need be. Will follow.    Electronically signed by Villa Franklin RN on 3/27/2019 at 3:19 PM  [phone]

## 2019-03-27 NOTE — PLAN OF CARE
Patient in bed, calls out appropriately, alarm activated. Call light within reach, bed in lowest position and wheels locked. Will continue to monitor.

## 2019-03-28 VITALS
BODY MASS INDEX: 34.6 KG/M2 | HEART RATE: 67 BPM | RESPIRATION RATE: 18 BRPM | WEIGHT: 188 LBS | HEIGHT: 62 IN | SYSTOLIC BLOOD PRESSURE: 138 MMHG | DIASTOLIC BLOOD PRESSURE: 89 MMHG | TEMPERATURE: 98.2 F | OXYGEN SATURATION: 93 %

## 2019-03-28 LAB
ANION GAP SERPL CALCULATED.3IONS-SCNC: 10 MMOL/L (ref 3–16)
BASOPHILS ABSOLUTE: 0 K/UL (ref 0–0.2)
BASOPHILS RELATIVE PERCENT: 0.4 %
BUN BLDV-MCNC: 22 MG/DL (ref 7–20)
CALCIUM SERPL-MCNC: 9.3 MG/DL (ref 8.3–10.6)
CHLORIDE BLD-SCNC: 100 MMOL/L (ref 99–110)
CO2: 32 MMOL/L (ref 21–32)
CREAT SERPL-MCNC: 1.4 MG/DL (ref 0.6–1.1)
EOSINOPHILS ABSOLUTE: 0.3 K/UL (ref 0–0.6)
EOSINOPHILS RELATIVE PERCENT: 3.1 %
GFR AFRICAN AMERICAN: 47
GFR NON-AFRICAN AMERICAN: 39
GLUCOSE BLD-MCNC: 107 MG/DL (ref 70–99)
GLUCOSE BLD-MCNC: 114 MG/DL (ref 70–99)
GLUCOSE BLD-MCNC: 120 MG/DL (ref 70–99)
GLUCOSE BLD-MCNC: 84 MG/DL (ref 70–99)
GRAM STAIN RESULT: ABNORMAL
HCT VFR BLD CALC: 31.4 % (ref 36–48)
HEMOGLOBIN: 10.3 G/DL (ref 12–16)
LYMPHOCYTES ABSOLUTE: 2.5 K/UL (ref 1–5.1)
LYMPHOCYTES RELATIVE PERCENT: 25.9 %
MCH RBC QN AUTO: 28.9 PG (ref 26–34)
MCHC RBC AUTO-ENTMCNC: 32.9 G/DL (ref 31–36)
MCV RBC AUTO: 87.9 FL (ref 80–100)
MONOCYTES ABSOLUTE: 1.1 K/UL (ref 0–1.3)
MONOCYTES RELATIVE PERCENT: 11.3 %
NEUTROPHILS ABSOLUTE: 5.7 K/UL (ref 1.7–7.7)
NEUTROPHILS RELATIVE PERCENT: 59.3 %
ORGANISM: ABNORMAL
ORGANISM: ABNORMAL
PDW BLD-RTO: 15.8 % (ref 12.4–15.4)
PERFORMED ON: ABNORMAL
PERFORMED ON: ABNORMAL
PERFORMED ON: NORMAL
PLATELET # BLD: 275 K/UL (ref 135–450)
PMV BLD AUTO: 8.5 FL (ref 5–10.5)
POTASSIUM REFLEX MAGNESIUM: 4.2 MMOL/L (ref 3.5–5.1)
RBC # BLD: 3.58 M/UL (ref 4–5.2)
SODIUM BLD-SCNC: 142 MMOL/L (ref 136–145)
WBC # BLD: 9.7 K/UL (ref 4–11)
WOUND/ABSCESS: ABNORMAL

## 2019-03-28 PROCEDURE — 6370000000 HC RX 637 (ALT 250 FOR IP): Performed by: STUDENT IN AN ORGANIZED HEALTH CARE EDUCATION/TRAINING PROGRAM

## 2019-03-28 PROCEDURE — 85025 COMPLETE CBC W/AUTO DIFF WBC: CPT

## 2019-03-28 PROCEDURE — 2580000003 HC RX 258: Performed by: STUDENT IN AN ORGANIZED HEALTH CARE EDUCATION/TRAINING PROGRAM

## 2019-03-28 PROCEDURE — 6360000002 HC RX W HCPCS: Performed by: STUDENT IN AN ORGANIZED HEALTH CARE EDUCATION/TRAINING PROGRAM

## 2019-03-28 PROCEDURE — 96366 THER/PROPH/DIAG IV INF ADDON: CPT

## 2019-03-28 PROCEDURE — 80048 BASIC METABOLIC PNL TOTAL CA: CPT

## 2019-03-28 PROCEDURE — 36415 COLL VENOUS BLD VENIPUNCTURE: CPT

## 2019-03-28 PROCEDURE — 99232 SBSQ HOSP IP/OBS MODERATE 35: CPT | Performed by: INTERNAL MEDICINE

## 2019-03-28 RX ORDER — AMOXICILLIN AND CLAVULANATE POTASSIUM 875; 125 MG/1; MG/1
1 TABLET, FILM COATED ORAL 2 TIMES DAILY
Qty: 14 TABLET | Refills: 0 | Status: SHIPPED | OUTPATIENT
Start: 2019-03-28 | End: 2019-04-04

## 2019-03-28 RX ADMIN — PANTOPRAZOLE SODIUM 40 MG: 40 TABLET, DELAYED RELEASE ORAL at 07:26

## 2019-03-28 RX ADMIN — Medication 10 ML: at 08:42

## 2019-03-28 RX ADMIN — INSULIN LISPRO 5 UNITS: 100 INJECTION, SOLUTION INTRAVENOUS; SUBCUTANEOUS at 12:36

## 2019-03-28 RX ADMIN — METHADONE HYDROCHLORIDE 140 MG: 10 TABLET ORAL at 09:11

## 2019-03-28 RX ADMIN — AMPICILLIN SODIUM AND SULBACTAM SODIUM 1.5 G: 1; .5 INJECTION, POWDER, FOR SOLUTION INTRAMUSCULAR; INTRAVENOUS at 09:11

## 2019-03-28 RX ADMIN — GABAPENTIN 800 MG: 400 CAPSULE ORAL at 12:39

## 2019-03-28 RX ADMIN — APIXABAN 10 MG: 5 TABLET, FILM COATED ORAL at 09:10

## 2019-03-28 RX ADMIN — GABAPENTIN 800 MG: 400 CAPSULE ORAL at 09:10

## 2019-03-28 RX ADMIN — INSULIN LISPRO 5 UNITS: 100 INJECTION, SOLUTION INTRAVENOUS; SUBCUTANEOUS at 08:40

## 2019-03-28 RX ADMIN — FUROSEMIDE 40 MG: 40 TABLET ORAL at 09:11

## 2019-03-28 RX ADMIN — AMPICILLIN SODIUM AND SULBACTAM SODIUM 1.5 G: 1; .5 INJECTION, POWDER, FOR SOLUTION INTRAMUSCULAR; INTRAVENOUS at 04:22

## 2019-03-28 RX ADMIN — ATORVASTATIN CALCIUM 20 MG: 20 TABLET, FILM COATED ORAL at 09:10

## 2019-03-28 ASSESSMENT — PAIN SCALES - GENERAL
PAINLEVEL_OUTOF10: 0
PAINLEVEL_OUTOF10: 3

## 2019-03-28 ASSESSMENT — PAIN DESCRIPTION - PROGRESSION: CLINICAL_PROGRESSION: NOT CHANGED

## 2019-03-28 NOTE — PROGRESS NOTES
Patient A&O x4, VSS, tolerating diet, pain in RLE has decreased,up with a walker to bathroom, ambulates well, no difficulty voiding, educated on importance of carb control diet, in bed with alarm activated, calls out appropriately. Blood sugar ranging from 290s to 300s. Call light within reach. No further requests. Will continue to monitor.

## 2019-03-28 NOTE — PROGRESS NOTES
lispro  5 Units Subcutaneous TID WC    amitriptyline  100 mg Oral Nightly    atorvastatin  20 mg Oral Daily    furosemide  40 mg Oral BID    gabapentin  800 mg Oral 4x Daily    pantoprazole  40 mg Oral QAM AC    nicotine  1 patch Transdermal Q24H    methadone  140 mg Oral Daily    metoprolol succinate  50 mg Oral Nightly    sodium chloride flush  10 mL Intravenous 2 times per day       Continuous Infusions:   dextrose         PRN Meds:  sodium chloride flush, magnesium hydroxide, ondansetron, glucose, dextrose, glucagon (rDNA), dextrose, albuterol      Assessment:     46 yo woman with hx obesity, DM, neuropathy.    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  L foot wound 10/2017 MRSA, E faecalis.     Admit 8/14/18 with R foot infection. Wound cult + light mixed skin sukhjinder, heavy E cloacae, moderate E coli    OR 8/17, R partial 5th ray resectoin.  Discharged on iv ertapenem / po linezolid x 6 weeks.(through 9/26/18)     Admit 1/4/19 with R leg cellulitis. Cult + heavy mixed skin sukhjinder, heavy E faecalis, heavy Ps aeurginosa, heavy C albicans. Discharged on po augmentin + ciprofloxacin     Admit 3/25 with R foot cellulitis, new wound. She had a cast taken off on 3/22. Seen on 3/25 by PCP, concerned and pt referred to ED. Afebrile, WBC 9.7. R LE doppler + DVT. BC, wound cult sent.   Started on vancomycin + zosyn.     Wound cult + gp B Strep.      IMP/  DM, neuropathy  R foot ulcer / cellulitis    Plan:     Agree with unasyn  Wound care and f/u per Podiatry     At discharge, po augmentin x 7 days     Discussed with pt  Magy Lloyd MD

## 2019-03-28 NOTE — DISCHARGE INSTR - COC
Continuity of Care Form    Patient Name: Adelso Feng   :  1965  MRN:  [id number]    Admit date:  3/25/2019  Discharge date:  3/28/19    Code Status Order: Full Code   Advance Directives:   [facility] Documentation     Date/Time Healthcare Directive Type of Healthcare Directive Copy in [address]  [address] Agent's Name Healthcare Agent's Phone Number    19  No, patient does not have an advance directive for healthcare treatment -- -- -- -- --          Admitting Physician:  Rosalee Halsted, MD  PCP: Migdalia Strauss MD    Discharging Nurse: [facility] Unit/Room#: 7426/9224-73  Discharging Unit Phone Number: [phone]    Emergency Contact:   Extended Emergency Contact Information  Primary Emergency Contact: ADNAE Arrington [address] Phone: [phone]  Relation: Parent  Secondary Emergency Contact: Verna [address] Phone: [phone]  Relation: Other    Past Surgical History:  Past Surgical History:   Procedure Laterality Date     SECTION  unknown    FOOT DEBRIDEMENT Right 2019    INCISION AND DRAINAGE WITH APPLICATION OF STRAVIX GRAFT RIGHT FOOT performed by Sapphire Santoyo DPM at [address] [place] [address],[place] [address] Left 2016    I & D left foot    OTHER SURGICAL HISTORY Right 10/20/2017    RIGHT GASTROC LENGTHENING ENDOSCOPIC, INJECTION OF AMNI GRAFT    OTHER SURGICAL HISTORY Right 2018    Diabetic foot ulcer I&D w/ integra graft application    VA DEBRIDEMENT, SKIN, SUB-Q TISSUE,MUSCLE,BONE,=<20 SQ CM Right 2018    RIGHT FOOT DEBRIDEMENT INCISION AND DRAINAGE, PARTIAL 5TH RAY AMPUTATION performed by Sapphire Santoyo DPM at [address] / [facility]  0/9663    cryotherapy done on lesion    TOE AMPUTATION Left 2017    AMPUTATION LEFT GREAT TOE                    Immunization History:   Immunization History Administered Date(s) Administered    Influenza Virus Vaccine 11/08/2011, 09/12/2014, 10/16/2015, 10/27/2017    Influenza, Mariana Sanders, 3 yrs and older, IM, PF (Fluzone 3 yrs and older or Afluria 5 yrs and older) 11/10/2016    Influenza, Mariana Sanders, 6 mo and older, IM, PF (Flulaval, Fluarix) 01/05/2019    Pneumococcal Polysaccharide ([id number]) 11/13/2015    Tdap (Boostrix, Adacel) 07/22/2014       Active Problems:  Patient Active Problem List   Diagnosis Code    Feet clawing Q66.89    Diabetic neuropathy ([facility] [address] E11.40    Tinea pedis B35.3    Dyslipidemia E78.5    HTN (hypertension) I10    Amenorrhea N91.2    Dyspareunia HVW1900    Neuropathy G62.9    Bacterial vaginosis N76.0, B96.89    Pain in back M54.9    Anomalies of nails Q84.6    Tobacco abuse Z72.0    Vaginal bleeding, abnormal N93.9    Carpal tunnel syndrome G56.00    Scalp lesion L98.9    ETOH abuse F10.10    Diabetes mellitus, type II ([facility]) E11.9    Pseudocyst, pancreas K86.3    Pancreatitis K85.90    Asthma J45.909    Pain, eye, right H57.11    Pneumonia J18.9    Nicotine addiction F17.200    Acute pancreatitis K85.90    Hypoxia R09.02    Onychomycosis B35.1    Depressive disorder, not elsewhere classified F32.9    Anxiety state F41.1    Tinea pedis B35.3    Open wound of left foot S91.302A    Burn T30.0    Obesity (BMI 30-39. 9) E66.9    S/P foot surgery, right Z98.890    Pain medication agreement signed Z02.89    Post-op pain G89.18    Open wound of left foot with complication H07.982L    Cellulitis L03.90    Diabetic foot infection (HCC) E11.628, L08.9    Bilateral lower extremity edema R60.0    Chronic multifocal osteomyelitis of left foot ([facility]) M86.372    Open wound of right foot S91.301A    Diabetic ulcer of right foot associated with type 2 diabetes mellitus, with fat layer exposed ([facility]) E11.621, L97.512    Cellulitis of right foot O63.506    Acute systolic congestive heart failure (HCC) I50.21    Acute osteomyelitis of metatarsal bone of right foot ([facility]) M86.171    Cellulitis of right lower extremity L03.115    Cast discomfort Z47.89    Bronchitis J40    Cellulitis and abscess of foot L03.119, L02.619    Diabetic polyneuropathy associated with type 2 diabetes mellitus ([facility]) E11.42    Group B streptococcal infection A49.1       Isolation/Infection:   Isolation          No Isolation        Patient Infection Status     Infection Encounter Level? Onset Date Added Added By Resolved Resolved By Review Date    MRSA No  08/02/11 Gonzalez Ulloa RN 05/11/15 Marc Valencia RN     ca/colonization spt and nares          Nurse Assessment:  Last Vital Signs: /89   Pulse 67   Temp 98.2 °F (36.8 °C) (Oral)   Resp 18   Ht 5' 2\" (1.575 m)   Wt 188 lb (85.3 kg)   SpO2 93%   BMI 34.39 kg/m²     Last documented pain score (0-10 scale): Pain Level: 3  Last Weight:   Wt Readings from Last 1 Encounters:   03/25/19 188 lb (85.3 kg)     Mental Status:  oriented and alert    IV Access:  - None    Nursing Mobility/ADLs:  Walking   Independent  Transfer  Independent  Bathing  Independent  Dressing  Independent  Toileting  Independent  Feeding  Independent  Med Admin  Independent  Med Delivery   whole    Wound Care Documentation and Therapy:  Wound 01/04/19 Foot Plantar;Right (Active)   Number of days: 82       Wound 01/04/19 Foot Left;Plantar under great toe (Active)   Number of days: 82        Elimination:  Continence:   · Bowel: Yes  · Bladder: Yes  Urinary Catheter: None   Colostomy/Ileostomy/Ileal Conduit: Yes       Date of Last BM: 3/27/19    Intake/Output Summary (Last 24 hours) at 3/28/2019 1345  Last data filed at 3/28/2019 1015  Gross per 24 hour   Intake 50 ml   Output --   Net 50 ml     No intake/output data recorded.     Safety Concerns:     None    Impairments/Disabilities:      None    Nutrition Therapy:  Current Nutrition Therapy:   - Oral Diet:  Carb Control 5 carbs/meal (2000kcals/day)    Routes of Feeding: Oral  Liquids: Thin Liquids  Daily Fluid Restriction: yes - amount no  Last Modified Barium Swallow with Video (Video Swallowing Test): not done    Treatments at the Time of Hospital Discharge:   Respiratory Treatments: n/a  Oxygen Therapy:  is not on home oxygen therapy. Ventilator:    - No ventilator support    Rehab Therapies: Skilled Nurse  Weight Bearing Status/Restrictions: No weight bearing restirctions  Other Medical Equipment (for information only, NOT a DME order):  cane  Other Treatments: n/a    Patient's personal belongings (please select all that are sent with patient):  None    RN SIGNATURE:  Electronically signed by Luis Enrique Wolf RN on 3/28/19 at 3:01 PM    CASE MANAGEMENT/SOCIAL WORK SECTION    Inpatient Status Date: ***    Readmission Risk Assessment Score:  Readmission Risk              Risk of Unplanned Readmission:        23           Discharging to Facility/ Agency   Name:  [facility] care    Address: [address] [place] [address] [address] [facility] [address] [email or web address]  Phone: [phone]  Fax: [phone]      / signature: {Esignature:[id number]}    PHYSICIAN SECTION    Prognosis: {Prognosis:8000049169}    Condition at Discharge: [facility] Patient Condition:783126915}    Rehab Potential (if transferring to Rehab): {Prognosis:7790728190}    Recommended Labs or Other Treatments After Discharge: ***    Physician Certification: I certify the above information and transfer of Derrick Cole  is necessary for the continuing treatment of the diagnosis listed and that she requires {Admit to Appropriate Level of Care:23760} for {GREATER/LESS:901017984} 30 days.      Update Admission H&P: {CHP DME Changes in BFDQO:888186931}    PHYSICIAN SIGNATURE:  {Esignature:877765264}

## 2019-03-28 NOTE — PLAN OF CARE
Problem: Falls - Risk of:  Goal: Will remain free from falls  Description  Will remain free from falls  Outcome: Ongoing  Goal: Absence of physical injury  Description  Absence of physical injury  Outcome: Ongoing     Problem: Mobility - Impaired:  Goal: Mobility will improve to maximum level  Description  Mobility will improve to maximum level  Outcome: Ongoing     Problem: Pain:  Description  Pain management should include both nonpharmacologic and pharmacologic interventions. Goal: Pain level will decrease  Description  Pain level will decrease  Outcome: Ongoing  Goal: Control of acute pain  Description  Control of acute pain  Outcome: Ongoing  Goal: Control of chronic pain  Description  Control of chronic pain  Outcome: Ongoing     Problem: Skin Integrity - Impaired:  Goal: Will show no infection signs and symptoms  Description  Will show no infection signs and symptoms  Outcome: Ongoing  Goal: Absence of new skin breakdown  Description  Absence of new skin breakdown  Outcome: Ongoing     Problem: Pain:  Description  Pain management should include both nonpharmacologic and pharmacologic interventions.   Goal: Pain level will decrease  Description  Pain level will decrease  Outcome: Ongoing  Goal: Control of acute pain  Description  Control of acute pain  Outcome: Ongoing  Goal: Control of chronic pain  Description  Control of chronic pain  Outcome: Ongoing

## 2019-03-28 NOTE — DISCHARGE SUMMARY
Hospital Medicine Discharge Summary    Patient ID: Grace Kaplan   Gender: female  : 1965   Age: 52 y.o. MRN: [id number]  Code Status: Full Code    Patient's PCP: Melba Allan MD    Admit Date: 3/25/2019     Discharge Date:   3/28/2019    Admitting Physician: Phill Livingston MD     Discharge Physician: Therese Coelho MD     Discharge Diagnoses: Active Hospital Problems    Diagnosis Date Noted    Diabetic polyneuropathy associated with type 2 diabetes mellitus ([facility] [address] [E11.42] 2019    Group B streptococcal infection [A49.1] 2019    Cellulitis and abscess of foot [L03.119, L02.619] 2019    Diabetic ulcer of right foot associated with type 2 diabetes mellitus, with fat layer exposed ([facility] [address] [M56.489, L97.512] 2018    Cellulitis [L03.90] 2018    Diabetic foot infection ([facility] [address] [P18.363, L08.9] 2018       The patient was seen and examined on day of discharge and this discharge summary is in conjunction with any daily progress note from day of discharge. Hospital Course:     Carlos Newman is a 53 y/o female with an extensive PMH significant for DMII, cellulitis, MRSA-infected leg wound (2017), diabetic foot ulcers, DVT (3/2004), pancreatitis who presents with right leg pain. Pt has been followed for her bilateral LE diabetic foot ulcers by Dr. Enoc Sood and Podiatry closely. She was admitted for cellulitis of her RLE and was placed initially on vancomycin and zosyn in the ED Subsequently transitioned to [facility] for total of 4 days of antibiotics while inpatient. X-ray showed no evidence of osteomyelitis. Wound cultures positive for staph and strep species and she was discharged on 7 day course of augmentin. Right leg doppler showed evidence of DVT with total occlusion of right gastrocnemius vein. She was started on anticoagulation with therapeutic lovenox due to her history of chronic wound healing and being symptomatic with pain.   She was discharged on Starter 31G X 5 MM MISC 1 each by Does not apply route daily  Qty: 100 each, Refills: 5    Associated Diagnoses: Diabetic polyneuropathy associated with type 2 diabetes mellitus (HCC)      blood glucose test strips (TRUE METRIX BLOOD GLUCOSE TEST) strip 1 each by In Vitro route 2 times daily As needed. Qty: 100 each, Refills: 4      Gauze Pads & Dressings MISC Please dispense 4x8 guaze, kerlix, and ace  Qty: 1 each, Refills: 2      !! Accu-Chek Softclix Lancets MISC 1 strip by Does not apply route 2 times daily Check before breakfast and before going to bed  Qty: 100 each, Refills: 3    Associated Diagnoses: Type 2 diabetes mellitus with diabetic polyneuropathy, unspecified long term insulin use status       ! ! - Potential duplicate medications found. Please discuss with provider. Signed:    Sachin Morales MD   3/28/2019      Thank you Fidelina Dozier MD for the opportunity to be involved in this patient's care. If you have any questions or concerns please feel free to contact me via [employer]. I have personally seen and evaluated this patient. I discussed findings and management with the resident, on 03/28/19  and agree as documented in this note       My time spent reviewing discharge plan and follow ups with resident, along with performing independent review of discharge medicines along with counseling the patient exceeds 30 minutes.     June [facility]  Attending Physician

## 2019-03-28 NOTE — CARE COORDINATION
[facility] order noted, all docs needed have been faxed to [facility] for home care services.       [employer] Bravo  Work mobile: [phone]  [facility] office: [phone]

## 2019-03-29 ENCOUNTER — OFFICE VISIT (OUTPATIENT)
Dept: INTERNAL MEDICINE CLINIC | Age: 54
End: 2019-03-29
Payer: COMMERCIAL

## 2019-03-29 DIAGNOSIS — L97.512 DIABETIC ULCER OF RIGHT FOOT ASSOCIATED WITH TYPE 2 DIABETES MELLITUS, WITH FAT LAYER EXPOSED, UNSPECIFIED PART OF FOOT (HCC): Primary | ICD-10-CM

## 2019-03-29 DIAGNOSIS — E11.621 DIABETIC ULCER OF RIGHT FOOT ASSOCIATED WITH TYPE 2 DIABETES MELLITUS, WITH FAT LAYER EXPOSED, UNSPECIFIED PART OF FOOT (HCC): Primary | ICD-10-CM

## 2019-03-29 DIAGNOSIS — L97.422 DIABETIC ULCER OF LEFT MIDFOOT ASSOCIATED WITH TYPE 2 DIABETES MELLITUS, WITH FAT LAYER EXPOSED (HCC): ICD-10-CM

## 2019-03-29 DIAGNOSIS — E11.621 DIABETIC ULCER OF LEFT MIDFOOT ASSOCIATED WITH TYPE 2 DIABETES MELLITUS, WITH FAT LAYER EXPOSED (HCC): ICD-10-CM

## 2019-03-29 PROCEDURE — 29405 APPL SHORT LEG CAST: CPT

## 2019-03-29 PROCEDURE — 99213 OFFICE O/P EST LOW 20 MIN: CPT | Performed by: STUDENT IN AN ORGANIZED HEALTH CARE EDUCATION/TRAINING PROGRAM

## 2019-03-30 LAB
BLOOD CULTURE, ROUTINE: NORMAL
CULTURE, BLOOD 2: NORMAL

## 2019-04-02 ENCOUNTER — OFFICE VISIT (OUTPATIENT)
Dept: INTERNAL MEDICINE CLINIC | Age: 54
End: 2019-04-02
Payer: COMMERCIAL

## 2019-04-02 DIAGNOSIS — L97.512 DIABETIC ULCER OF RIGHT FOOT ASSOCIATED WITH TYPE 2 DIABETES MELLITUS, WITH FAT LAYER EXPOSED, UNSPECIFIED PART OF FOOT (HCC): ICD-10-CM

## 2019-04-02 DIAGNOSIS — L97.422 DIABETIC ULCER OF LEFT MIDFOOT ASSOCIATED WITH TYPE 2 DIABETES MELLITUS, WITH FAT LAYER EXPOSED (HCC): Primary | ICD-10-CM

## 2019-04-02 DIAGNOSIS — E11.621 DIABETIC ULCER OF LEFT MIDFOOT ASSOCIATED WITH TYPE 2 DIABETES MELLITUS, WITH FAT LAYER EXPOSED (HCC): Primary | ICD-10-CM

## 2019-04-02 DIAGNOSIS — E11.621 DIABETIC ULCER OF RIGHT FOOT ASSOCIATED WITH TYPE 2 DIABETES MELLITUS, WITH FAT LAYER EXPOSED, UNSPECIFIED PART OF FOOT (HCC): ICD-10-CM

## 2019-04-02 PROCEDURE — 99213 OFFICE O/P EST LOW 20 MIN: CPT | Performed by: STUDENT IN AN ORGANIZED HEALTH CARE EDUCATION/TRAINING PROGRAM

## 2019-04-02 PROCEDURE — 29405 APPL SHORT LEG CAST: CPT

## 2019-04-02 NOTE — PROGRESS NOTES
Department of Podiatry  Resident Progress Note    Mickey Winters  Allergies: Sulfa antibiotics    SUBJECTIVE  The patient is a 52 y.o. female who presents to clinic for follow up of b/l wounds. Patient states she has not had any problems since being discharged from the hospital. Patient states she has tried to remain non weight bearing \"as much as possible\". Patient denies driving to her appointment today and states she has someone that is able to drive her around for the time being since she has the wound on her right foot. Patient has left both dressings dry, clean, and intact. Patient denies any fever, nausea, vomiting, chest pain, chills, shortness of breath and has no other pedal complaints at this time.      Past Medical History:        Diagnosis Date    Amenorrhea     Anomalies of nails     Asthma 5/14/04    Athlete's foot 8/2010    Bacterial vaginosis 04/2008    Carpal tunnel syndrome 5/2007    COPD (chronic obstructive pulmonary disease) ([facility] [address]     Diabetes mellitus type II 08/2007    Diabetic neuropathy ([facility] [address] 8/10    DVT (deep venous thrombosis) ([facility] [address] 3/2004    Dyslipidemia 5/2009    Dyspareunia 05/2009    ETOH abuse 3/04/2007    Feet clawing     HTN (hypertension)     Hx of blood clots     Hyperlipidemia     MRSA (methicillin resistant staph aureus) culture positive 11/06/2017; 11/17/2017    foot; leg     Neuropathy 05/2009    polyneuropathy    Pain, back 04/2008    Pain, eye, right 5/14/04    Pancreatitis 5/14/04    Pseudocyst, pancreas 5/14/04    Scalp lesion 08/2007    Tinea pedis     Tobacco abuse 03/2008    Vaginal bleeding, abnormal 6/2007       REVIEW OF SYSTEMS:  CONSTITUTIONAL:  negative for  fevers and chills  RESPIRATORY:  negative for  dyspnea  CARDIOVASCULAR:  negative for  chest pain  GASTROINTESTINAL:  negative for nausea, vomiting and abdominal pain  INTEGUMENT/BREAST:  positive for dryness, skin color change and wound to the plantar aspect Right compression. <25 cm squared of tissue removed  -Wound on the Left foot dressed with JUN, DSD, cast padding, well padded total contact cast  -Patient unhappy with cast stating it was \"too bulky\" and refused to leave until a new cast was applied. At this time, the cast was removed and a wound   -sharp excisional debridement of wound on the Right foot with #15 blade down to and including epithelial layer with no bleeding noted  <25 cm2 debrided.  -Wound on the Right foot dressed with JUN, gauze, cast padding, felt foam offloading pad, and ace  -Dry sterile dressing placed on the Right lower extremity  -Patient educated on importance of strict non-weightbearing to the Left lower extremity. Patient is in understanding of this.  -Patient to continue with written instructions for daily wound care dressing changes as above for right foot wound.  Patient is in agreement with this.   -Patient instructed to remain heel weight bearing on the right in a surgical shoe    -Patient will return to clinic in one week for total contact cast exchange and local wound care    Ivis Arnold DPM  PGY-1, Pager #: [phone]

## 2019-04-02 NOTE — PROGRESS NOTES
OUTPATIENT SPECIALTY PODIATRY VISIT      Nursing Progress Note      April 2, 2019  Magy Benton    Patient description of the problem: left foot cast intact. Right foot dressing inplace. Observations:right foot wound with small amount serous drainage. Mild malodor noted. Ambulates: without assistance    Gait: Normal     Assistive Devices: straight cane    Fall History: No    Foot Hygiene: good    Foot Wear Proper: Yes    Impaired Skin Integrity: Yes      Pain Assessment:  Pain Present: no  Pain Score: /10  Pain Quality/Description:   Pain Onset:   ago  Pain Goal of patient: /10    Education Assessment:    Identify the learner who is being assessed for education:  patient                    Ability to Learn:  Exhibits ability to grasp concepts and respond to questions: Medium  Ready to Learn: No  calm   Preferred Method of Learning:  written  Barriers to Learning: Verbalizes interest  Special Considerations due to cultural, Shinto, spiritual beliefs:  No  Language:  English  :  No    Comments:  Continues to bear weight on casted leg even though she has been repeatedly told not to.      Roxanne Nair Page  3:25 PM 4/2/2019

## 2019-04-02 NOTE — PATIENT INSTRUCTIONS
Return Monday   Keep cast clean dry and intact. NO weight on casted leg. Continue dressing changes with tiffany on other foot.

## 2019-04-08 ENCOUNTER — OFFICE VISIT (OUTPATIENT)
Dept: INTERNAL MEDICINE CLINIC | Age: 54
End: 2019-04-08
Payer: COMMERCIAL

## 2019-04-08 DIAGNOSIS — L97.512 DIABETIC ULCER OF RIGHT FOOT ASSOCIATED WITH TYPE 2 DIABETES MELLITUS, WITH FAT LAYER EXPOSED, UNSPECIFIED PART OF FOOT (HCC): Primary | ICD-10-CM

## 2019-04-08 DIAGNOSIS — E11.621 DIABETIC ULCER OF LEFT MIDFOOT ASSOCIATED WITH TYPE 2 DIABETES MELLITUS, WITH FAT LAYER EXPOSED (HCC): ICD-10-CM

## 2019-04-08 DIAGNOSIS — E11.621 DIABETIC ULCER OF RIGHT FOOT ASSOCIATED WITH TYPE 2 DIABETES MELLITUS, WITH FAT LAYER EXPOSED, UNSPECIFIED PART OF FOOT (HCC): Primary | ICD-10-CM

## 2019-04-08 DIAGNOSIS — L97.422 DIABETIC ULCER OF LEFT MIDFOOT ASSOCIATED WITH TYPE 2 DIABETES MELLITUS, WITH FAT LAYER EXPOSED (HCC): ICD-10-CM

## 2019-04-08 PROBLEM — L97.522 DIABETIC ULCER OF LEFT FOOT ASSOCIATED WITH TYPE 2 DIABETES MELLITUS, WITH FAT LAYER EXPOSED (HCC): Status: ACTIVE | Noted: 2019-03-29

## 2019-04-08 PROCEDURE — 29405 APPL SHORT LEG CAST: CPT

## 2019-04-08 PROCEDURE — 99213 OFFICE O/P EST LOW 20 MIN: CPT | Performed by: STUDENT IN AN ORGANIZED HEALTH CARE EDUCATION/TRAINING PROGRAM

## 2019-04-08 NOTE — PROGRESS NOTES
Department of Podiatry  Resident Progress Note    Virginia Julian  Allergies: Sulfa antibiotics    SUBJECTIVE  The patient is a 52 y.o. female who presents to clinic for wound check to bilateral feet. Patient states that she has been doing well since her last clinic appointment. Patient states she has tried to remain non weight bearing \"as much as possible\" to the Left foot but does weight bear when using the rest room and getting around her house and to clinic appointments. Patient does endorse Right heel pain today and for the past few days and is wondering if it may be due to her bearing more weight on this side compared to her left side. Patient denies driving to her appointment today and states she has someone that is able to drive her around for the time being since she has the wound on her right foot. Patient present to clinic today with  both dressings dry, clean, and intact to her feet. However, patient does endorse not changing her Right foot dressing everyday due to her daughter being out of town for the past few days and she is the main care giver that helps with dressing changes. Patient is asking if she should have the nurses who keep calling her to have them help with dressing changes. Patient does endorse being diabetic with last blood sugar of 245 mg/dL. Patient denies any other pedal complaints at this time. Of note, patient states that she has been taking her blood thinner medications without any difficulty.     Past Medical History:        Diagnosis Date    Amenorrhea     Anomalies of nails     Asthma 5/14/04    Athlete's foot 8/2010    Bacterial vaginosis 04/2008    Carpal tunnel syndrome 5/2007    [facility] [address]     Diabetes mellitus type II 08/2007    Diabetic neuropathy ([facility] [address] 8/10    DVT (deep venous thrombosis) ([facility] [address] 3/2004    Dyslipidemia 5/2009    Dyspareunia 05/2009    ETOH abuse 3/04/2007    Feet clawing     HTN (hypertension)     Hx of blood clots     Hyperlipidemia     MRSA (methicillin resistant staph aureus) culture positive 11/06/2017; 11/17/2017    foot; leg     Neuropathy 05/2009    polyneuropathy    Pain, back 04/2008    Pain, eye, right 5/14/04    Pancreatitis 5/14/04    Pseudocyst, pancreas 5/14/04    Scalp lesion 08/2007    Tinea pedis     Tobacco abuse 03/2008    Vaginal bleeding, abnormal 6/2007       REVIEW OF SYSTEMS:  CONSTITUTIONAL:  negative for  fevers and chills  RESPIRATORY:  negative for  dyspnea  CARDIOVASCULAR:  negative for  chest pain  GASTROINTESTINAL:  negative for nausea, vomiting and abdominal pain  INTEGUMENT/BREAST:  positive for dryness, skin color change and wound to the plantar aspect bilateral feet  MUSCULOSKELETAL:  positive for  pain and bone pain  negative for  myalgias and arthralgias    OBJECTIVE    VASCULAR: DP and PT pulses are palpable 1/4 b/l. CFT is brisk to the remaining digits of the foot, b/l. Skin temperature is warm to warm from proximal to distal with focal calor noted, Right lower extremity. 2 + pitting edema noted, Right lower extremity. Pain with calf compression, Right lower extremity.      NEUROLOGIC: Gross and epicritic sensation is diminished b/l. Protective sensation is diminished at all pedal sites b/l.     DERMATOLOGIC: Wound noted to plantar aspect Right foot sub 4th metatarsal head. Wound measures 1.4 x 0.6 x 0.2 cm. Periwound maceration noted sub 4th metatarsal region, Right foot. No drainage noted. Wound does not probe, track or undermine. Mild malodor noted. Xerosis noted to the Right foot. Wound noted to Left foot sub 1st metatarsal head, wound measures 2.0 x 1.6 x 0.3 cm. Wound base is mix granular and fibrotic with periwound hyperkeratosis noted. Mild serous drainage noted. No malodor noted. MUSCULOSKELETAL: Muscle strength is 5/5 for all pedal groups tested. Pain with palpation Right heel.  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 ASSESSMENT  1. Neuropathic ulceration sub 4th metatarsal head, Right foot 2/2 pressure-Carpenter grade I  2. Neuropathic ulceration 2/2 DM, sub 1st metatarsal head, left foot-Carpenter grade I  3. DM Type II uncontrolled with peripheral neuropathy  4. Right heel pain  5. History of Non-compliance    PLAN  -Evaluation and management x 20 minutes and greater than 50% of the time spent explaining the etiology and treatment with the patient.   -Total contact cast on left removed  -Excisional debridement of wound on the Left foot using 3 mm currette and #15 blade down to and including dermal layer. Healthy bleeding noted and stopped via compression. <25 cm squared of tissue removed. Patient tolerated procedure without incident.  -Wound on the Left foot dressed with JUN, DSD, cast padding, well padded total contact cast  -Excisional debridement of wound on the Right foot with 3 mm curette and #15 blade down to and including dermal layer with bleeding noted. Hemostasis achieved with compression. <25 cm2 debrided. Patient tolerated procedure without incident.  -Wound on the Right foot dressed with JUN, gauze, kerlix and Ace bandage  -Patient educated on importance of strict non-weightbearing to the Left lower extremity. Patient is in understanding of this.  -Patient to continue with written instructions for daily wound care dressing changes as above for right foot wound. Patient is in agreement with this.   -Patient instructed she is weightbearing as tolerated in CAM boot to the Right lower extremity  -Patient educated that her right heel pain may be related to bearing more weight on the Right lower extremity at this time, and the fact that she was in a total contact cast previously to the Right lower extremity for a period of time prior.  Patient is in understanding of this.  -Patient will return to clinic in one week for total contact cast exchange and local wound care    David Mullins DPM PGY-1  Pager: [phone]

## 2019-04-08 NOTE — PROGRESS NOTES
OUTPATIENT SPECIALTY PODIATRY VISIT      Nursing Progress Note      April 8, 2019  Amos Pea    Patient description of the problem: wound check    Observations:  Cast and dressing intact    Ambulates: without assistance    Gait: Normal     Assistive Devices: none    Fall History: No    Foot Hygiene: good    Foot Wear Proper: Yes    Impaired Skin Integrity: Yes      Pain Assessment:  Pain Present: no  Pain Score: 0/10  Pain Quality/Description:   Pain Onset:   ago  Pain Goal of patient: /10    Education Assessment:    Identify the learner who is being assessed for education:  patient                    Ability to Learn:  Exhibits ability to grasp concepts and respond to questions: Medium  Ready to Learn: No  calm   Preferred Method of Learning:  written  Barriers to Learning: Verbalizes interest  Special Considerations due to cultural, Latter day, spiritual beliefs:  No  Language:  English  :  No    Guera Ureña Page  11:58 AM 4/8/2019

## 2019-04-15 ENCOUNTER — OFFICE VISIT (OUTPATIENT)
Dept: INTERNAL MEDICINE CLINIC | Age: 54
End: 2019-04-15
Payer: COMMERCIAL

## 2019-04-15 DIAGNOSIS — L97.522 DIABETIC ULCER OF LEFT FOOT ASSOCIATED WITH TYPE 2 DIABETES MELLITUS, WITH FAT LAYER EXPOSED, UNSPECIFIED PART OF FOOT (HCC): Primary | ICD-10-CM

## 2019-04-15 DIAGNOSIS — L97.512 DIABETIC ULCER OF RIGHT FOOT ASSOCIATED WITH TYPE 2 DIABETES MELLITUS, WITH FAT LAYER EXPOSED, UNSPECIFIED PART OF FOOT (HCC): ICD-10-CM

## 2019-04-15 DIAGNOSIS — E11.621 DIABETIC ULCER OF LEFT FOOT ASSOCIATED WITH TYPE 2 DIABETES MELLITUS, WITH FAT LAYER EXPOSED, UNSPECIFIED PART OF FOOT (HCC): Primary | ICD-10-CM

## 2019-04-15 DIAGNOSIS — E11.621 DIABETIC ULCER OF RIGHT FOOT ASSOCIATED WITH TYPE 2 DIABETES MELLITUS, WITH FAT LAYER EXPOSED, UNSPECIFIED PART OF FOOT (HCC): ICD-10-CM

## 2019-04-15 PROCEDURE — 99213 OFFICE O/P EST LOW 20 MIN: CPT | Performed by: STUDENT IN AN ORGANIZED HEALTH CARE EDUCATION/TRAINING PROGRAM

## 2019-04-15 PROCEDURE — 29405 APPL SHORT LEG CAST: CPT

## 2019-04-15 NOTE — PROGRESS NOTES
OUTPATIENT SPECIALTY PODIATRY VISIT      Nursing Progress Note      April 15, 2019  Micheal Vance    Patient description of the problem: right foot wound smaller . Slight drainage. Left cast intact.     Observations: as above    Ambulates: without assistance    Gait: Normal     Assistive Devices: none    Fall History: No    Foot Hygiene: poor    Foot Wear Proper: Yes    Impaired Skin Integrity: Yes      Pain Assessment:  Pain Present: no  Pain Score: 0/10  Pain Quality/Description:   Pain Onset:   ago  Pain Goal of patient: /10    Education Assessment:    Identify the learner who is being assessed for education:  patient                    Ability to Learn:  Exhibits ability to grasp concepts and respond to questions: Medium  Ready to Learn: No  calm   Preferred Method of Learning:  written  Barriers to Learning: Verbalizes interest  Special Considerations due to cultural, Shinto, spiritual beliefs:  No  Language:  English  :  No    Alberto Narayan Page  10:21 AM 4/15/2019

## 2019-04-15 NOTE — PATIENT INSTRUCTIONS
Daily dressing changes right foot with tiffany  gauze , kerlix and ace wrap/ Cam boot   Keep cast on clean and dry to left foot. Return in 1 weeks. If any problems with the cast or wounds call or got to ER.

## 2019-04-15 NOTE — PROGRESS NOTES
Department of Podiatry  Resident Progress Note    Nate Kennedy  Allergies: Sulfa antibiotics    SUBJECTIVE  The patient is a 52 y.o. female who presents to clinic today for wound check to bilateral feet. Patient states that she has been doing well since last being seen. Patient states that she has been performing daily dressing changes to the Right foot consisting of JUN, gauze, kerlix and Ace bandage. She states that she has been wearing the CAM boot on the Right lower extremity as instructed for most of the time, however, she does state that at times she has worn a post-op in order for her to drive. Patient states that she has left the Total contact cast on the left lower extremity clean, dry and intact. Patient does endorse some phantom limb pain on the Right lower extremity at the level of where her previous 5th toe on the Right foot was. She states that she has been dealing with this for the last week or so. She is not able to describe the pain but that it is just there at times and comes and goes. Patient states that she has a  now and has been talking to nurses about home health care and stating we need to to fill out paperwork for the nurses to come out and change her dressings daily. However, she states that she does not have any paperwork for us at this time. Patient denies any other pedal complaints at this time.      Past Medical History:        Diagnosis Date    Amenorrhea     Anomalies of nails     Asthma 5/14/04    Athlete's foot 8/2010    Bacterial vaginosis 04/2008    Carpal tunnel syndrome 5/2007    [facility]) [facility] [address]     Diabetes mellitus type II 08/2007    Diabetic neuropathy ([facility] [address] 8/10    DVT (deep venous thrombosis) ([facility] [address] 3/2004    Dyslipidemia 5/2009    Dyspareunia 05/2009    ETOH abuse 3/04/2007    Feet clawing     HTN (hypertension)     Hx of blood clots     Hyperlipidemia     MRSA (methicillin resistant staph aureus) culture positive 11/06/2017; 11/17/2017    foot; leg     Neuropathy 05/2009    polyneuropathy    Pain, back 04/2008    Pain, eye, right 5/14/04    Pancreatitis 5/14/04    Pseudocyst, pancreas 5/14/04    Scalp lesion 08/2007    Tinea pedis     Tobacco abuse 03/2008    Vaginal bleeding, abnormal 6/2007       REVIEW OF SYSTEMS:  CONSTITUTIONAL:  negative for  fevers and chills  RESPIRATORY:  negative for  dyspnea  CARDIOVASCULAR:  negative for  chest pain  GASTROINTESTINAL:  negative for nausea, vomiting and abdominal pain  INTEGUMENT/BREAST:  positive for skin color change and open wounds bilateral feet  MUSCULOSKELETAL:  negative for  myalgias, arthralgias and bone pain    OBJECTIVE    VASCULAR: DP and PT pulses are palpable 1/4 b/l. CFT is brisk to the remaining digits of the foot, b/l. Skin temperature is warm to warm from proximal to distal with focal calor noted, Right lower extremity. 2 + pitting edema noted, Right lower extremity. Pain with calf compression, Right lower extremity.      NEUROLOGIC: Gross and epicritic sensation is diminished b/l. Protective sensation is diminished at all pedal sites b/l.     DERMATOLOGIC: Wound noted to plantar aspect Right foot sub 4th metatarsal head. Wound measures 1.4 x 0.6 x 0.2 cm. Periwound maceration noted sub 4th metatarsal region, Right foot. No drainage noted. Wound does not probe, track or undermine. No malodor noted. Xerosis noted to the Right foot. Wound noted to Left foot sub 1st metatarsal head, wound measures 1.6 x 1.6 x 0.2 cm. Wound base is mix granular and fibrotic with periwound hyperkeratosis noted. No drainage noted. No malodor noted. Pre-debridement      Post-debridement      MUSCULOSKELETAL: Muscle strength is 5/5 for all pedal groups tested. Pain with palpation Right heel. 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 ASSESSMENT  1.  Neuropathic ulceration sub 4th metatarsal head, Right foot 2/2 pressure-Carpenter grade I  2. Neuropathic ulceration 2/2 DM, sub 1st metatarsal head, left foot-Carpenter grade I  3. DM Type II uncontrolled with peripheral neuropathy  4. Right heel pain  5. History of Non-compliance    PLAN  -Evaluation and management x 20 minutes and greater than 50% of the time spent explaining the etiology and treatment with the patient.   -Total contact cast on left removed  -Excisional debridement of wound on the Left foot using 3 mm currette down to and including dermal layer. Healthy bleeding noted and stopped via compression. <25 cm squared of tissue removed. Patient tolerated procedure without incident.  -Wound on the Left foot dressed with JUN, DSD, cast padding, well padded total contact cast  -Excisional debridement of wound on the Right foot with 3 mm curette down to and including dermal layer with bleeding noted. Hemostasis achieved with compression.  <25 cm2 debrided. Patient tolerated procedure without incident.  -Wound on the Right foot dressed with JUN, gauze, kerlix and Ace bandage  -Patient educated on importance of strict non-weightbearing to the Left lower extremity. Patient is in understanding of this.  -Patient to continue with written instructions for daily wound care dressing changes as above for right foot wound. Patient is in agreement with this.   -Patient instructed she is weightbearing as tolerated in CAM boot to the Right lower extremity at all times  -Patient educated that her continued Right heel pain may be related to bearing more weight on the Right lower extremity at this time, and the fact that she was in a total contact cast previously to the Right lower extremity for a period of time prior. Patient will benefit from wearing CAM boot Right lower extremity while ambulating.  Patient is in understanding of this.  -Patient will return to clinic in one week for total contact cast exchange and local wound care    Manan Ziegler DPM PGY-1  Pager: [phone]

## 2019-04-22 ENCOUNTER — OFFICE VISIT (OUTPATIENT)
Dept: INTERNAL MEDICINE CLINIC | Age: 54
End: 2019-04-22
Payer: COMMERCIAL

## 2019-04-22 DIAGNOSIS — L97.522 DIABETIC ULCER OF LEFT FOOT ASSOCIATED WITH TYPE 2 DIABETES MELLITUS, WITH FAT LAYER EXPOSED, UNSPECIFIED PART OF FOOT (HCC): Primary | ICD-10-CM

## 2019-04-22 DIAGNOSIS — E11.621 DIABETIC ULCER OF RIGHT FOOT ASSOCIATED WITH TYPE 2 DIABETES MELLITUS, WITH FAT LAYER EXPOSED, UNSPECIFIED PART OF FOOT (HCC): ICD-10-CM

## 2019-04-22 DIAGNOSIS — E11.621 DIABETIC ULCER OF LEFT FOOT ASSOCIATED WITH TYPE 2 DIABETES MELLITUS, WITH FAT LAYER EXPOSED, UNSPECIFIED PART OF FOOT (HCC): Primary | ICD-10-CM

## 2019-04-22 DIAGNOSIS — L97.512 DIABETIC ULCER OF RIGHT FOOT ASSOCIATED WITH TYPE 2 DIABETES MELLITUS, WITH FAT LAYER EXPOSED, UNSPECIFIED PART OF FOOT (HCC): ICD-10-CM

## 2019-04-22 DIAGNOSIS — M76.61 TENDONITIS, ACHILLES, RIGHT: ICD-10-CM

## 2019-04-22 PROCEDURE — 99213 OFFICE O/P EST LOW 20 MIN: CPT | Performed by: STUDENT IN AN ORGANIZED HEALTH CARE EDUCATION/TRAINING PROGRAM

## 2019-04-22 PROCEDURE — 29405 APPL SHORT LEG CAST: CPT

## 2019-04-22 NOTE — PATIENT INSTRUCTIONS
stretch in the back of your calf and/or Achilles area. Some of your weight should still be on the other leg. 3. Hold this position for at least 15 to 30 seconds. 4. Repeat 2 to 4 times a session, up to 5 times a day or whenever your Achilles tendon starts to feel tight. This stretch can also be done with your knee slightly bent. Strength exercise    1. This exercise will get you started on building strength after an Achilles tendon injury. Your doctor or physical therapist can help you move on to more challenging exercises as you heal and get stronger. 2. Stand on a step with your heel off the edge of the step. Hold on to a handrail or wall for balance. 3. Push up on your toes, then slowly count to 10 as you lower yourself back down until your heel is below the step. If it hurts to push up on your toes, try putting most of your weight on your other foot as you push up, or try using your arms to help you. If you can't do this exercise without causing pain, stop the exercise and talk to your doctor. 4. Repeat the exercise 8 to 12 times, half with the knee straight and half with the knee bent. Follow-up care is a key part of your treatment and safety. Be sure to make and go to all appointments, and call your doctor if you are having problems. It's also a good idea to know your test results and keep a list of the medicines you take. Where can you learn more? Go to [email or web address] and sign in to your [employer] account. Enter U160 in the LearnSomething box to learn more about \"Achilles Tendon: Exercises. \"     If you do not have an account, please click on the \"Sign Up Now\" link. Current as of: September 20, 2018  Content Version: 11.9  © 8041-0642 [employer]. Care instructions adapted under license by [facility] [employer] [facility] ([facility]).  If you have questions about a medical condition or this instruction, always ask your healthcare professional. Bety Booker any warranty or liability for your use of this information. Return in one week'  Keep cast on clean and dry. Continue tiffany on right.   Non weight on cast.

## 2019-04-22 NOTE — PROGRESS NOTES
Department of Podiatry  Resident Progress Note    Garland Mayberry  Allergies: Sulfa antibiotics    SUBJECTIVE  The patient is a 52 y.o. female who presents to clinic today for wound check to bilateral feet. Patient states that she has been doing well since last being seen. Patient states that she has been performing daily dressing changes to the Right foot consisting of JUN, gauze, kerlix and Ace bandage. She states that she has been not been wearing the CAM boot on the Right lower extremity as instructed due to Right leg pain, she has been wearing her post-op shoe otherwise. Patient states that she has left the Total contact cast on the left lower extremity clean, dry and intact. Patient does endorse some pain along the posterior aspect of the Right lower extremity for the past week now. Pain is worse with palpation and better with rest. Patient denies any other pedal complaints at this time.      Past Medical History:        Diagnosis Date    Amenorrhea     Anomalies of nails     Asthma 5/14/04    Athlete's foot 8/2010    Bacterial vaginosis 04/2008    Carpal tunnel syndrome 5/2007    [facility]) ([facility] [address]     Diabetes mellitus type II 08/2007    Diabetic neuropathy ([facility] [address] 8/10    DVT (deep venous thrombosis) ([facility] [address] 3/2004    Dyslipidemia 5/2009    Dyspareunia 05/2009    ETOH abuse 3/04/2007    Feet clawing     HTN (hypertension)     Hx of blood clots     Hyperlipidemia     MRSA (methicillin resistant staph aureus) culture positive 11/06/2017; 11/17/2017    foot; leg     Neuropathy 05/2009    polyneuropathy    Pain, back 04/2008    Pain, eye, right 5/14/04    Pancreatitis 5/14/04    Pseudocyst, pancreas 5/14/04    Scalp lesion 08/2007    Tinea pedis     Tobacco abuse 03/2008    Vaginal bleeding, abnormal 6/2007       REVIEW OF SYSTEMS:  CONSTITUTIONAL:  negative for  fevers and chills  RESPIRATORY:  negative for  dyspnea  CARDIOVASCULAR:  negative for  chest the Left foot using 3 mm currette down to and including dermal layer. Healthy bleeding noted and stopped via compression. <25 cm squared of tissue removed. Patient tolerated procedure without incident.  -Wound on the Left foot dressed with JUN, DSD, cast padding, well padded total contact cast  -Excisional debridement of wound on the Right foot with 3 mm curette down to and including dermal layer with bleeding noted. Hemostasis achieved with compression.  <25 cm2 debrided. Patient tolerated procedure without incident.  -Wound on the Right foot dressed with JUN, gauze, kerlix and Ace bandage  -Patient educated on importance of strict non-weightbearing to the Left lower extremity. Patient is in understanding of this.  -Patient to continue with written instructions for daily wound care dressing changes as above for right foot wound. Patient is in agreement with this.   -Patient instructed she is weightbearing as tolerated in CAM boot to the Right lower extremity at all times  -Patient educated that her continued Right Achilles tendon pain may be related to bearing more weight on the Right lower extremity at this time and the tendon being disused when in TCC, and the fact that she was in a total contact cast previously to the Right lower extremity for a period of time prior. Patient will benefit from wearing CAM boot Right lower extremity while ambulating.  Patient is in understanding of this.  -Educational information dispensed in regards to Achilles tendon stretching.  -Patient instructed to take tylenol as needed for pain, as she is limited in medications due to her being on warfarin.  -Patient will return to clinic in one week for total contact cast exchange and local wound care    Cathy Medley DPM PGY-1  Pager: [phone]

## 2019-04-29 ENCOUNTER — OFFICE VISIT (OUTPATIENT)
Dept: INTERNAL MEDICINE CLINIC | Age: 54
End: 2019-04-29
Payer: COMMERCIAL

## 2019-04-29 DIAGNOSIS — E11.621 DIABETIC ULCER OF RIGHT FOOT ASSOCIATED WITH TYPE 2 DIABETES MELLITUS, WITH FAT LAYER EXPOSED, UNSPECIFIED PART OF FOOT (HCC): ICD-10-CM

## 2019-04-29 DIAGNOSIS — L97.522 DIABETIC ULCER OF LEFT FOOT ASSOCIATED WITH TYPE 2 DIABETES MELLITUS, WITH FAT LAYER EXPOSED, UNSPECIFIED PART OF FOOT (HCC): Primary | ICD-10-CM

## 2019-04-29 DIAGNOSIS — E11.621 DIABETIC ULCER OF LEFT FOOT ASSOCIATED WITH TYPE 2 DIABETES MELLITUS, WITH FAT LAYER EXPOSED, UNSPECIFIED PART OF FOOT (HCC): Primary | ICD-10-CM

## 2019-04-29 DIAGNOSIS — L97.512 DIABETIC ULCER OF RIGHT FOOT ASSOCIATED WITH TYPE 2 DIABETES MELLITUS, WITH FAT LAYER EXPOSED, UNSPECIFIED PART OF FOOT (HCC): ICD-10-CM

## 2019-04-29 PROCEDURE — 99213 OFFICE O/P EST LOW 20 MIN: CPT | Performed by: STUDENT IN AN ORGANIZED HEALTH CARE EDUCATION/TRAINING PROGRAM

## 2019-04-29 NOTE — PROGRESS NOTES
OUTPATIENT SPECIALTY PODIATRY VISIT      Nursing Progress Note      April 29, 2019  Gareth Medley    Patient description of the problem: right foot ulcer pink in center macerated around edges,   Cast intact to lefdt foot    Observations: as abov3     Ambulates: without assistance    Gait: Normal     Assistive Devices: straight cane    Fall History: No    Foot Hygiene: good    Foot Wear Proper: Yes    Impaired Skin Integrity: Yes      Pain Assessment:  Pain Present: no  Pain Score: 0/10  Pain Quality/Description:   Pain Onset:   ago  Pain Goal of patient: /10    Education Assessment:    Identify the learner who is being assessed for education:  patient                    Ability to Learn:  Exhibits ability to grasp concepts and respond to questions: Medium  Ready to Learn: No  calm   Preferred Method of Learning:  written  Barriers to Learning: Verbalizes interest  Special Considerations due to cultural, Zoroastrian, spiritual beliefs:  No  Language:  English  :  No    Jennifer Harper Page  10:22 AM 4/29/2019
positive for skin color change and open wounds to bilateral feet  MUSCULOSKELETAL:  negative for  myalgias, arthralgias, pain and joint swelling    OBJECTIVE    VASCULAR: DP and PT pulses are palpable 1/4 b/l. CFT is brisk to the remaining digits of the foot, b/l. Skin temperature is warm to warm from proximal to distal with focal calor noted, Right lower extremity. 2 + pitting edema noted, Right lower extremity. Pain with calf compression, Right lower extremity.      NEUROLOGIC: Gross and epicritic sensation is diminished b/l. Protective sensation is diminished at all pedal sites b/l.     DERMATOLOGIC: Wound noted to plantar aspect Right foot sub 4th metatarsal head. Wound measures 2.4 x 1.0 x 0.2 cm. Periwound maceration noted sub 4th metatarsal region, Right foot. No drainage noted. Wound does not probe, track or undermine. No malodor noted. Xerosis noted to the Right foot. Wound noted to Left foot sub 1st metatarsal head, wound measures 1.6 x 1.8 x 0.2 cm. Wound base is mix granular and fibrotic with periwound hyperkeratosis noted. No drainage noted. No malodor noted. Wound does not probe, track or undermine.     Pre-debridement      Post-debridement        MUSCULOSKELETAL: Muscle strength is 5/5 for all pedal groups tested. No pain with palpation, bilateral feet. 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 ASSESSMENT  1. Neuropathic ulceration sub 4th metatarsal head, Right foot 2/2 pressure-Carpenter grade I  2. Neuropathic ulceration 2/2 DM, sub 1st metatarsal head, left foot-Carpenter grade I  3. DM Type II uncontrolled with peripheral neuropathy  4. Achilles tendonitis, Right lower extremity-Resolved  5.  History of Non-compliance    PLAN  -Evaluation and management x 20 minutes and greater than 50% of the time spent explaining the etiology and treatment with the patient.   -Total contact cast on left lower extremity removed  -Excisional debridement of wound on the

## 2019-05-06 ENCOUNTER — OFFICE VISIT (OUTPATIENT)
Dept: INTERNAL MEDICINE CLINIC | Age: 54
End: 2019-05-06
Payer: COMMERCIAL

## 2019-05-06 DIAGNOSIS — L97.512 DIABETIC ULCER OF RIGHT FOOT ASSOCIATED WITH TYPE 2 DIABETES MELLITUS, WITH FAT LAYER EXPOSED, UNSPECIFIED PART OF FOOT (HCC): ICD-10-CM

## 2019-05-06 DIAGNOSIS — E11.621 DIABETIC ULCER OF LEFT FOOT ASSOCIATED WITH TYPE 2 DIABETES MELLITUS, WITH FAT LAYER EXPOSED, UNSPECIFIED PART OF FOOT (HCC): Primary | ICD-10-CM

## 2019-05-06 DIAGNOSIS — E11.621 DIABETIC ULCER OF RIGHT FOOT ASSOCIATED WITH TYPE 2 DIABETES MELLITUS, WITH FAT LAYER EXPOSED, UNSPECIFIED PART OF FOOT (HCC): ICD-10-CM

## 2019-05-06 DIAGNOSIS — Z79.4 TYPE 2 DIABETES MELLITUS WITH DIABETIC POLYNEUROPATHY, WITH LONG-TERM CURRENT USE OF INSULIN (HCC): ICD-10-CM

## 2019-05-06 DIAGNOSIS — E11.42 TYPE 2 DIABETES MELLITUS WITH DIABETIC POLYNEUROPATHY, WITH LONG-TERM CURRENT USE OF INSULIN (HCC): ICD-10-CM

## 2019-05-06 DIAGNOSIS — L97.522 DIABETIC ULCER OF LEFT FOOT ASSOCIATED WITH TYPE 2 DIABETES MELLITUS, WITH FAT LAYER EXPOSED, UNSPECIFIED PART OF FOOT (HCC): Primary | ICD-10-CM

## 2019-05-06 PROCEDURE — 29405 APPL SHORT LEG CAST: CPT

## 2019-05-06 PROCEDURE — 99213 OFFICE O/P EST LOW 20 MIN: CPT | Performed by: STUDENT IN AN ORGANIZED HEALTH CARE EDUCATION/TRAINING PROGRAM

## 2019-05-06 RX ORDER — AMITRIPTYLINE HYDROCHLORIDE 100 MG/1
100 TABLET, FILM COATED ORAL NIGHTLY
Qty: 30 TABLET | Refills: 1
Start: 2019-05-06 | End: 2019-07-15 | Stop reason: SDUPTHER

## 2019-05-06 NOTE — PROGRESS NOTES
dyspnea  CARDIOVASCULAR:  negative for  chest pain  GASTROINTESTINAL:  negative for nausea, vomiting and abdominal pain  INTEGUMENT/BREAST:  positive for skin color change and open wounds to bilateral feet  MUSCULOSKELETAL:  negative for  myalgias, arthralgias, pain and joint swelling    OBJECTIVE    VASCULAR: DP and PT pulses are palpable 1/4 b/l. CFT is brisk to the remaining digits of the foot, b/l. Skin temperature is warm to cool from proximal to distal with no focal calor noted, Right lower extremity. No edema noted, Right lower extremity. No pain with calf compression, Right lower extremity.      NEUROLOGIC: Gross and epicritic sensation is diminished b/l. Protective sensation is diminished at all pedal sites b/l.     DERMATOLOGIC: Wound noted to plantar aspect Right foot sub 4th metatarsal head. Wound measures 2.1 x 1.0 x 0.2 cm. Periwound maceration noted sub 4th metatarsal region, Right foot. No drainage noted. Wound does not probe, track or undermine. Malodor noted. Xerosis noted to the Right foot. Wound noted to Left foot sub 1st metatarsal head, wound measures 1.6 x 1.6 x 0.2 cm. Wound base is mix granular and fibrotic with periwound hyperkeratosis noted. No drainage noted. No malodor noted. Wound does not probe, track or undermine. Malodor noted.     Pre-debridement      Post-debridement      MUSCULOSKELETAL: Muscle strength is 5/5 for all pedal groups tested. No pain with palpation, bilateral feet. 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 ASSESSMENT  1. Neuropathic ulceration sub 4th metatarsal head, Right foot 2/2 pressure-Carpenter grade I  2. Neuropathic ulceration 2/2 DM, sub 1st metatarsal head, left foot-Carpenter grade I  3. DM Type II uncontrolled with peripheral neuropathy  4. History of Non-compliance    PLAN  -Evaluation and management x 15 minutes and greater than 50% of the time spent explaining the etiology and treatment with the patient. -Total contact cast on left lower extremity removed  -Excisional debridement of wound on the Left foot using 3 mm currette down to and including dermal layer. Healthy bleeding noted and stopped via compression. <25 cm squared of tissue removed. Patient tolerated procedure without incident.  -Wound on the Left foot dressed with JUN, DSD, cast padding, well padded total contact cast  -Excisional debridement of wound on the Right foot with 3 mm curette down to and including subcutaneous layer with bleeding noted. Hemostasis achieved with compression.  <25 cm2 debrided. Patient tolerated procedure without incident.  -Wound on the Right foot dressed with JUN, gauze, kerlix and Ace bandage  -Patient educated on importance of strict non-weightbearing to the Left lower extremity. Patient is in understanding of this.  -Patient to continue with written instructions for daily wound care dressing changes as per above for right foot wound.  Patient is in agreement with this.   -Patient instructed she is weightbearing as tolerated in CAM boot to the Right lower extremity at all times  -Patient will return to clinic in one week for total contact cast exchange and local wound care    Christiana Preston DPM PGY-1  Pager: [phone]

## 2019-05-06 NOTE — PATIENT INSTRUCTIONS
Return in one week. Continue same Modesta dressing changes to the right foot ulcer and remain in cam boot. Keep left cast clean dry and intact.

## 2019-05-06 NOTE — PROGRESS NOTES
OUTPATIENT SPECIALTY PODIATRY VISIT      Nursing Progress Note      May 6, 2019  Garland Mayberry    Patient description of the problem: bilateral wounc checkk. Observations:  Cast intact right leg and dressing on right.  Right foot ulcer pink with thick whit skin around the ulcer    Ambulates: without assistance    Gait: Normal     Assistive Devices: straight cane    Fall History: No    Foot Hygiene: good    Foot Wear Proper: Yes    Impaired Skin Integrity: Yes      Pain Assessment:  Pain Present: no  Pain Score: 0/10  Pain Quality/Description:   Pain Onset:   ago  Pain Goal of patient: /10    Education Assessment:    Identify the learner who is being assessed for education:  patient                    Ability to Learn:  Exhibits ability to grasp concepts and respond to questions: Medium  Ready to Learn: No  calm   Preferred Method of Learning:  written  Barriers to Learning: Verbalizes interest  Special Considerations due to cultural, Orthodox, spiritual beliefs:  No  Language:  English  :  Lianna Boudreaux Page  10:15 AM 5/6/2019

## 2019-05-07 DIAGNOSIS — E11.42 DIABETIC POLYNEUROPATHY ASSOCIATED WITH TYPE 2 DIABETES MELLITUS (HCC): ICD-10-CM

## 2019-05-07 RX ORDER — GABAPENTIN 800 MG/1
800 TABLET ORAL 4 TIMES DAILY
Qty: 120 TABLET | Refills: 0 | Status: ON HOLD
Start: 2019-05-07 | End: 2019-06-12 | Stop reason: HOSPADM

## 2019-05-17 ENCOUNTER — OFFICE VISIT (OUTPATIENT)
Dept: INTERNAL MEDICINE CLINIC | Age: 54
End: 2019-05-17
Payer: COMMERCIAL

## 2019-05-17 DIAGNOSIS — E11.621 DIABETIC ULCER OF LEFT FOOT ASSOCIATED WITH TYPE 2 DIABETES MELLITUS, WITH FAT LAYER EXPOSED, UNSPECIFIED PART OF FOOT (HCC): ICD-10-CM

## 2019-05-17 DIAGNOSIS — E11.621 DIABETIC ULCER OF RIGHT FOOT ASSOCIATED WITH TYPE 2 DIABETES MELLITUS, WITH FAT LAYER EXPOSED, UNSPECIFIED PART OF FOOT (HCC): Primary | ICD-10-CM

## 2019-05-17 DIAGNOSIS — L97.512 DIABETIC ULCER OF RIGHT FOOT ASSOCIATED WITH TYPE 2 DIABETES MELLITUS, WITH FAT LAYER EXPOSED, UNSPECIFIED PART OF FOOT (HCC): Primary | ICD-10-CM

## 2019-05-17 DIAGNOSIS — L97.522 DIABETIC ULCER OF LEFT FOOT ASSOCIATED WITH TYPE 2 DIABETES MELLITUS, WITH FAT LAYER EXPOSED, UNSPECIFIED PART OF FOOT (HCC): ICD-10-CM

## 2019-05-17 PROCEDURE — 99213 OFFICE O/P EST LOW 20 MIN: CPT | Performed by: STUDENT IN AN ORGANIZED HEALTH CARE EDUCATION/TRAINING PROGRAM

## 2019-05-17 PROCEDURE — 29405 APPL SHORT LEG CAST: CPT

## 2019-05-17 PROCEDURE — 11721 DEBRIDE NAIL 6 OR MORE: CPT

## 2019-05-17 RX ORDER — AMMONIUM LACTATE 12 G/100G
LOTION TOPICAL
Qty: 1 BOTTLE | Refills: 0 | Status: SHIPPED | OUTPATIENT
Start: 2019-05-17 | End: 2020-09-18 | Stop reason: SDUPTHER

## 2019-05-17 NOTE — PATIENT INSTRUCTIONS
Lac hytrin lotion to right leg only . Keep cast on clean and dry. Change dressing right foot with tiffany guaze and ace as you have been doing. Return in 1 week. Zuri Benítez

## 2019-05-17 NOTE — PROGRESS NOTES
foot; leg     Neuropathy 05/2009    polyneuropathy    Pain, back 04/2008    Pain, eye, right 5/14/04    Pancreatitis 5/14/04    Pseudocyst, pancreas 5/14/04    Scalp lesion 08/2007    Tinea pedis     Tobacco abuse 03/2008    Vaginal bleeding, abnormal 6/2007       REVIEW OF SYSTEMS:  CONSTITUTIONAL:  negative for  fevers and chills  RESPIRATORY:  negative for  dyspnea  CARDIOVASCULAR:  negative for  chest pain  GASTROINTESTINAL:  negative for nausea, vomiting and abdominal pain  INTEGUMENT/BREAST:  positive for skin color change and open wounds to bilateral feet  MUSCULOSKELETAL:  negative for  myalgias, arthralgias, pain and joint swelling    OBJECTIVE    VASCULAR: DP and PT pulses are palpable 1/4 b/l. CFT is brisk to the remaining digits of the foot, b/l. Skin temperature is warm to cool from proximal to distal with no focal calor noted, Right lower extremity. No edema noted, Right lower extremity. No pain with calf compression, Right lower extremity.      NEUROLOGIC: Gross and epicritic sensation is diminished b/l. Protective sensation is diminished at all pedal sites b/l.     DERMATOLOGIC: Wound noted to plantar aspect Right foot sub 4th metatarsal head. Wound measures  3.2 x 2.4 x 0.4 cm. Periwound maceration noted sub 4th metatarsal region, Right foot. Serous drainage noted. Wound does not probe, track or undermine. Malodor noted. Xerosis noted to the Right foot. Wound noted to Left foot sub 1st metatarsal head, wound measures 1.8 x 2.0 x 0.2 cm. Wound base is fibrotic with periwound hyperkeratosis noted. No drainage noted. No malodor noted. Wound does not probe, track or undermine. Malodor noted.     Pre-debridement      Post-debridement      MUSCULOSKELETAL: Muscle strength is 5/5 for all pedal groups tested. No pain with palpation, bilateral feet. 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 ASSESSMENT  1.  Neuropathic ulceration sub 4th metatarsal head, Right foot 2/2 pressure-Carpenter grade II  2. Neuropathic ulceration 2/2 DM, sub 1st metatarsal head, left foot-Carpenter grade I  3. DM Type II uncontrolled with peripheral neuropathy  4. History of Non-compliance  5. Personal History of Nicotine Dependence    PLAN  -Evaluation and management x 20 minutes and greater than 50% of the time spent explaining the etiology and treatment with the patient.   -Total contact cast on left lower extremity removed  -Excisional debridement of wound on the Left foot using a #15 blade and 3 mm currette down to and including dermal layer. Healthy bleeding noted and stopped via compression. <25 cm squared of tissue removed. Patient tolerated procedure without incident.  -Wound on the Left foot dressed with JUN, DSD, cast padding, well padded total contact cast  -Excisional debridement of wound on the Right foot with #15 blade and 3 mm curette down to and including subcutaneous layer with bleeding noted. Hemostasis achieved with compression.  <25 cm2 debrided. Patient tolerated procedure without incident.  -Wound on the Right foot dressed with JUN, gauze, kerlix and Ace bandage  -Patient educated on importance of strict non-weightbearing to the Left lower extremity. Patient is in understanding of this.  -Patient to continue with written instructions for daily wound care dressing changes as per above for right foot wound.  Patient is in agreement with this.   -Patient instructed she is weightbearing as tolerated in CAM boot to the Right lower extremity at all times  -Patient educated on importance of smoking cessation and the deleterious affects of nicotine on vasculature and inhibiting wound healing potential. Patient is in understanding of this.  -Patient will return to clinic in one week for total contact cast exchange and local wound care    Markell Reinoso DPM PGY-1  Pager: [phone]

## 2019-05-24 ENCOUNTER — OFFICE VISIT (OUTPATIENT)
Dept: INTERNAL MEDICINE CLINIC | Age: 54
End: 2019-05-24
Payer: COMMERCIAL

## 2019-05-24 ENCOUNTER — HOSPITAL ENCOUNTER (EMERGENCY)
Age: 54
Discharge: HOME | End: 2019-05-24
Attending: EMERGENCY MEDICINE
Payer: COMMERCIAL

## 2019-05-24 ENCOUNTER — TELEPHONE (OUTPATIENT)
Dept: INTERNAL MEDICINE CLINIC | Age: 54
End: 2019-05-24

## 2019-05-24 VITALS
RESPIRATION RATE: 22 BRPM | OXYGEN SATURATION: 95 % | TEMPERATURE: 98.4 F | HEART RATE: 67 BPM | SYSTOLIC BLOOD PRESSURE: 142 MMHG | DIASTOLIC BLOOD PRESSURE: 84 MMHG

## 2019-05-24 DIAGNOSIS — E11.621 DIABETIC ULCER OF RIGHT FOOT ASSOCIATED WITH TYPE 2 DIABETES MELLITUS, WITH BONE INVOLVEMENT WITHOUT EVIDENCE OF NECROSIS, UNSPECIFIED PART OF FOOT (HCC): ICD-10-CM

## 2019-05-24 DIAGNOSIS — Z79.4 TYPE 2 DIABETES MELLITUS WITH HYPERGLYCEMIA, WITH LONG-TERM CURRENT USE OF INSULIN (HCC): Primary | ICD-10-CM

## 2019-05-24 DIAGNOSIS — L97.522 DIABETIC ULCER OF LEFT FOOT ASSOCIATED WITH TYPE 2 DIABETES MELLITUS, WITH FAT LAYER EXPOSED, UNSPECIFIED PART OF FOOT (HCC): Primary | ICD-10-CM

## 2019-05-24 DIAGNOSIS — E11.621 DIABETIC ULCER OF LEFT FOOT ASSOCIATED WITH TYPE 2 DIABETES MELLITUS, WITH FAT LAYER EXPOSED, UNSPECIFIED PART OF FOOT (HCC): Primary | ICD-10-CM

## 2019-05-24 DIAGNOSIS — E11.65 TYPE 2 DIABETES MELLITUS WITH HYPERGLYCEMIA, WITH LONG-TERM CURRENT USE OF INSULIN (HCC): Primary | ICD-10-CM

## 2019-05-24 DIAGNOSIS — L97.516 DIABETIC ULCER OF RIGHT FOOT ASSOCIATED WITH TYPE 2 DIABETES MELLITUS, WITH BONE INVOLVEMENT WITHOUT EVIDENCE OF NECROSIS, UNSPECIFIED PART OF FOOT (HCC): ICD-10-CM

## 2019-05-24 LAB
ANION GAP SERPL CALCULATED.3IONS-SCNC: 10 MMOL/L (ref 3–16)
ANION GAP SERPL CALCULATED.3IONS-SCNC: 12 MMOL/L (ref 3–16)
BACTERIA: ABNORMAL /HPF
BASE EXCESS VENOUS: 3 (ref -3–3)
BASOPHILS ABSOLUTE: 0 K/UL (ref 0–0.2)
BASOPHILS RELATIVE PERCENT: 0.3 %
BILIRUBIN URINE: NEGATIVE
BLOOD, URINE: ABNORMAL
BUN BLDV-MCNC: 32 MG/DL (ref 7–20)
BUN BLDV-MCNC: 32 MG/DL (ref 7–20)
CALCIUM SERPL-MCNC: 8.5 MG/DL (ref 8.3–10.6)
CALCIUM SERPL-MCNC: 8.8 MG/DL (ref 8.3–10.6)
CHLORIDE BLD-SCNC: 89 MMOL/L (ref 99–110)
CHLORIDE BLD-SCNC: 92 MMOL/L (ref 99–110)
CLARITY: ABNORMAL
CO2: 26 MMOL/L (ref 21–32)
CO2: 28 MMOL/L (ref 21–32)
COLOR: YELLOW
CREAT SERPL-MCNC: 1.7 MG/DL (ref 0.6–1.1)
CREAT SERPL-MCNC: 1.8 MG/DL (ref 0.6–1.1)
EKG ATRIAL RATE: 71 BPM
EKG DIAGNOSIS: NORMAL
EKG P AXIS: 44 DEGREES
EKG P-R INTERVAL: 166 MS
EKG Q-T INTERVAL: 404 MS
EKG QRS DURATION: 90 MS
EKG QTC CALCULATION (BAZETT): 439 MS
EKG R AXIS: 0 DEGREES
EKG T AXIS: 35 DEGREES
EKG VENTRICULAR RATE: 71 BPM
EOSINOPHILS ABSOLUTE: 0.1 K/UL (ref 0–0.6)
EOSINOPHILS RELATIVE PERCENT: 1 %
EPITHELIAL CELLS, UA: ABNORMAL /HPF
GFR AFRICAN AMERICAN: 35
GFR AFRICAN AMERICAN: 38
GFR NON-AFRICAN AMERICAN: 29
GFR NON-AFRICAN AMERICAN: 31
GLUCOSE BLD-MCNC: 468 MG/DL (ref 70–99)
GLUCOSE BLD-MCNC: 570 MG/DL (ref 70–99)
GLUCOSE BLD-MCNC: 574 MG/DL (ref 70–99)
GLUCOSE BLD-MCNC: >600 MG/DL (ref 70–99)
GLUCOSE BLD-MCNC: >600 MG/DL (ref 70–99)
GLUCOSE URINE: >=1000 MG/DL
HCO3 VENOUS: 26.9 MMOL/L (ref 23–29)
HCT VFR BLD CALC: 32.2 % (ref 36–48)
HEMOGLOBIN: 10.4 G/DL (ref 12–16)
KETONES, URINE: NEGATIVE MG/DL
LACTATE: 1.29 MMOL/L (ref 0.4–2)
LEUKOCYTE ESTERASE, URINE: ABNORMAL
LYMPHOCYTES ABSOLUTE: 1.4 K/UL (ref 1–5.1)
LYMPHOCYTES RELATIVE PERCENT: 12.8 %
MAGNESIUM: 2.4 MG/DL (ref 1.8–2.4)
MCH RBC QN AUTO: 28.4 PG (ref 26–34)
MCHC RBC AUTO-ENTMCNC: 32.2 G/DL (ref 31–36)
MCV RBC AUTO: 88 FL (ref 80–100)
MICROSCOPIC EXAMINATION: YES
MONOCYTES ABSOLUTE: 0.6 K/UL (ref 0–1.3)
MONOCYTES RELATIVE PERCENT: 5.6 %
NEUTROPHILS ABSOLUTE: 8.8 K/UL (ref 1.7–7.7)
NEUTROPHILS RELATIVE PERCENT: 80.3 %
NITRITE, URINE: NEGATIVE
O2 SAT, VEN: 89 %
PCO2, VEN: 37.9 MM HG (ref 40–50)
PDW BLD-RTO: 15.7 % (ref 12.4–15.4)
PERFORMED ON: ABNORMAL
PH UA: 6 (ref 5–8)
PH VENOUS: 7.46 (ref 7.35–7.45)
PLATELET # BLD: 381 K/UL (ref 135–450)
PMV BLD AUTO: 8.7 FL (ref 5–10.5)
PO2, VEN: 53 MM HG
POC SAMPLE TYPE: ABNORMAL
POTASSIUM SERPL-SCNC: 4.4 MMOL/L (ref 3.5–5.1)
POTASSIUM SERPL-SCNC: 4.9 MMOL/L (ref 3.5–5.1)
PROTEIN UA: 30 MG/DL
RBC # BLD: 3.66 M/UL (ref 4–5.2)
RBC UA: ABNORMAL /HPF (ref 0–2)
SODIUM BLD-SCNC: 127 MMOL/L (ref 136–145)
SODIUM BLD-SCNC: 130 MMOL/L (ref 136–145)
SPECIFIC GRAVITY UA: 1.02 (ref 1–1.03)
TCO2 CALC VENOUS: 28 MMOL/L
TROPONIN: <0.01 NG/ML
URINE TYPE: ABNORMAL
UROBILINOGEN, URINE: 0.2 E.U./DL
WBC # BLD: 11 K/UL (ref 4–11)
WBC UA: ABNORMAL /HPF (ref 0–5)

## 2019-05-24 PROCEDURE — 84484 ASSAY OF TROPONIN QUANT: CPT

## 2019-05-24 PROCEDURE — 2580000003 HC RX 258: Performed by: EMERGENCY MEDICINE

## 2019-05-24 PROCEDURE — 85025 COMPLETE CBC W/AUTO DIFF WBC: CPT

## 2019-05-24 PROCEDURE — 83605 ASSAY OF LACTIC ACID: CPT

## 2019-05-24 PROCEDURE — 96360 HYDRATION IV INFUSION INIT: CPT

## 2019-05-24 PROCEDURE — 36415 COLL VENOUS BLD VENIPUNCTURE: CPT

## 2019-05-24 PROCEDURE — 83735 ASSAY OF MAGNESIUM: CPT

## 2019-05-24 PROCEDURE — 29405 APPL SHORT LEG CAST: CPT

## 2019-05-24 PROCEDURE — 99213 OFFICE O/P EST LOW 20 MIN: CPT | Performed by: STUDENT IN AN ORGANIZED HEALTH CARE EDUCATION/TRAINING PROGRAM

## 2019-05-24 PROCEDURE — 80048 BASIC METABOLIC PNL TOTAL CA: CPT

## 2019-05-24 PROCEDURE — 82803 BLOOD GASES ANY COMBINATION: CPT

## 2019-05-24 PROCEDURE — 81001 URINALYSIS AUTO W/SCOPE: CPT

## 2019-05-24 PROCEDURE — 93005 ELECTROCARDIOGRAM TRACING: CPT | Performed by: EMERGENCY MEDICINE

## 2019-05-24 PROCEDURE — 99284 EMERGENCY DEPT VISIT MOD MDM: CPT

## 2019-05-24 PROCEDURE — 96361 HYDRATE IV INFUSION ADD-ON: CPT

## 2019-05-24 PROCEDURE — 6370000000 HC RX 637 (ALT 250 FOR IP): Performed by: EMERGENCY MEDICINE

## 2019-05-24 RX ORDER — SODIUM CHLORIDE, SODIUM LACTATE, POTASSIUM CHLORIDE, AND CALCIUM CHLORIDE .6; .31; .03; .02 G/100ML; G/100ML; G/100ML; G/100ML
1000 INJECTION, SOLUTION INTRAVENOUS ONCE
Status: COMPLETED | OUTPATIENT
Start: 2019-05-24 | End: 2019-05-24

## 2019-05-24 RX ORDER — AMOXICILLIN AND CLAVULANATE POTASSIUM 875; 125 MG/1; MG/1
1 TABLET, FILM COATED ORAL 2 TIMES DAILY
Qty: 20 TABLET | Refills: 0 | Status: ON HOLD | OUTPATIENT
Start: 2019-05-24 | End: 2019-06-07 | Stop reason: HOSPADM

## 2019-05-24 RX ORDER — AMOXICILLIN AND CLAVULANATE POTASSIUM 875; 125 MG/1; MG/1
1 TABLET, FILM COATED ORAL 2 TIMES DAILY
Qty: 20 TABLET | Refills: 0 | Status: SHIPPED | OUTPATIENT
Start: 2019-05-24 | End: 2019-05-24 | Stop reason: CLARIF

## 2019-05-24 RX ADMIN — INSULIN HUMAN 10 UNITS: 100 INJECTION, SOLUTION PARENTERAL at 18:29

## 2019-05-24 RX ADMIN — SODIUM CHLORIDE, POTASSIUM CHLORIDE, SODIUM LACTATE AND CALCIUM CHLORIDE 1000 ML: 600; 310; 30; 20 INJECTION, SOLUTION INTRAVENOUS at 20:26

## 2019-05-24 RX ADMIN — SODIUM CHLORIDE, POTASSIUM CHLORIDE, SODIUM LACTATE AND CALCIUM CHLORIDE 1000 ML: 600; 310; 30; 20 INJECTION, SOLUTION INTRAVENOUS at 17:46

## 2019-05-24 ASSESSMENT — PAIN SCALES - GENERAL: PAINLEVEL_OUTOF10: 8

## 2019-05-24 ASSESSMENT — ENCOUNTER SYMPTOMS
DIARRHEA: 0
RHINORRHEA: 0
VOMITING: 0
NAUSEA: 0
COUGH: 0
SHORTNESS OF BREATH: 0
ABDOMINAL PAIN: 0
BACK PAIN: 0

## 2019-05-24 ASSESSMENT — PAIN DESCRIPTION - ORIENTATION: ORIENTATION: RIGHT

## 2019-05-24 ASSESSMENT — PAIN DESCRIPTION - LOCATION: LOCATION: FOOT

## 2019-05-24 NOTE — PATIENT INSTRUCTIONS
Go immediately to the [facility] ER for blood sugars. Cast must be kept clean dry and intact,   Change dressing with tiffany daily. Schedule MRI. Dressing change daily with Tiffany. Augmentin one tablet twice a day   Get physical for surgery.

## 2019-05-24 NOTE — PROGRESS NOTES
OUTPATIENT SPECIALTY PODIATRY VISIT      Nursing Progress Note      May 24, 2019  Te Ferguson    Patient description of the problem: wound check bilateral.     Observations: left foot in cast . right foot ulcer with malodor . Ambulates: without assistance    Gait: Normal     Assistive Devices: straight cane    Fall History: No    Foot Hygiene: poor    Foot Wear Proper: Yes    Impaired Skin Integrity: Yes      Pain Assessment:  Pain Present: yes  Pain Score: 5/10  Pain Quality/Description: Aching  Pain Onset: 1 weeks ago  Pain Goal of patient: 0/10    Education Assessment:    Identify the learner who is being assessed for education:  family                    Ability to Learn:  Exhibits ability to grasp concepts and respond to questions: Medium  Ready to Learn: No  calm   Preferred Method of Learning:  written  Barriers to Learning: Verbalizes interest  Special Considerations due to cultural, Voodoo, spiritual beliefs:  No  Language:  English  :  No    Comments:  States sugars over 500 this morning. Took her medications but never rechecked her sugars. Checked her blood sugar here it is running too high to read times 2. Patient also states she is passing out . Just doesn't feel good. Crying.    Daughter with patient  Moris Benitez  3:23 PM 5/24/2019

## 2019-05-24 NOTE — PROGRESS NOTES
Department of Podiatry  Resident Progress Note    Rakel Delcid  Allergies: Sulfa antibiotics    SUBJECTIVE  The patient is a 52 y.o. female who presents to clinic today for wound check to bilateral feet and total contact cast exchange, Left lower extremity. Patient is present with daughter in room today. Patient states that she has not  been well since last being seen. Patient states that she has continued to be more active within the last two weeks on her feet, and when doing her dressing changes to the Right foot she has noticed the wound appears bigger to her and has noticed increased drainage within the Right foot wound. Patient states that she has not noticed an odor to the area, but has complaints of pain and her blood sugars have been out of control since this past tuesday. Pain is described as being achy and rated as 5/10 on VAS. Pain is worse with ambulation and better with rest. Patients daughter states that when she has had foot infections in the past, and that her blood sugars are out of control when this occurs. Patient also states that she has continued to not check her blood sugars regularly and has not been taking her insulin all of the time. Patients daughter also states that her mother has continued to smoke cigarettes and pot. She also endorses falling asleep at times and is concerned this may be due to her Right foot wound and blood sugars. Patient states that she has been performing dressing changes to the Right foot wound consisting of JUN, gauze, kerlix and Ace bandage. Patient states that she has been tolerating the total contact cast with little difficulty and has keep it clean dry and intact. Patient denies any other pedal complaints at this time. Patient did not check her blood sugars this AM but in clinic today, they were checked twice and both times were unable to be read.       Past Medical History:        Diagnosis Date    Amenorrhea     Anomalies of nails     Asthma 5/14/04    Athlete's foot 8/2010    Bacterial vaginosis 04/2008    Carpal tunnel syndrome 5/2007    COPD [facility]ronic obstructive [facility] disease) [facility] [address]     Diabetes mellitus type II 08/2007    Diabetic neuropathy ([facility] [address] 8/10    DVT (deep venous thrombosis) ([facility] 3/2004    Dyslipidemia 5/2009    Dyspareunia 05/2009    ETOH abuse 3/04/2007    Feet clawing     HTN (hypertension)     Hx of blood clots     Hyperlipidemia     MRSA (methicillin resistant staph aureus) culture positive 11/06/2017; 11/17/2017    foot; leg     Neuropathy 05/2009    polyneuropathy    Pain, back 04/2008    Pain, eye, right 5/14/04    Pancreatitis 5/14/04    Pseudocyst, pancreas 5/14/04    Scalp lesion 08/2007    Tinea pedis     Tobacco abuse 03/2008    Vaginal bleeding, abnormal 6/2007       REVIEW OF SYSTEMS:  CONSTITUTIONAL:  negative for  fevers and chills  RESPIRATORY:  negative for  dyspnea  CARDIOVASCULAR:  negative for  chest pain  GASTROINTESTINAL:  negative for nausea, vomiting and abdominal pain  INTEGUMENT/BREAST:  positive for skin color change and open wounds to bilateral feet  MUSCULOSKELETAL:  negative for  myalgias, arthralgias, pain and joint swelling    OBJECTIVE    VASCULAR: DP and PT pulses are palpable 1/4 b/l. CFT is brisk to the remaining digits of the foot, b/l. Skin temperature is warm to warm from proximal to distal with no focal calor noted, Right lower extremity. No edema noted, Right lower extremity. No pain with calf compression, Right lower extremity.      NEUROLOGIC: Gross and epicritic sensation is diminished b/l. Protective sensation is diminished at all pedal sites b/l.     DERMATOLOGIC: Wound noted to plantar aspect Right foot sub 4th metatarsal head. Wound measures  3.2 x 2.4 x 0.6 cm. Periwound maceration noted sub 4th metatarsal region, Right foot. Scant purulent drainage and serous drainage noted. Wound does probe to bone.  Wound does track to dorsum of Right foot and does undermine circumferentially approximately 1 cm in diameter. Malodor noted. Xerosis noted to the Right foot. Wound noted to Left foot sub 1st metatarsal head, wound measures 1.8 x 2.0 x 0.2 cm. Wound base is fibrotic with periwound hyperkeratosis noted. No drainage noted. No malodor noted. Wound does not probe, track or undermine. Malodor noted.         MUSCULOSKELETAL: Muscle strength is 5/5 for all pedal groups tested. Pain with palpation to the plantar aspect of the Right foot at the level of the wound. 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 ASSESSMENT  1. Neuropathic ulceration sub 4th metatarsal head, Right foot 2/2 pressure-Carpenter grade III  2. Neuropathic ulceration 2/2 DM, sub 1st metatarsal head, left foot-Carpenter grade I  3. DM Type II uncontrolled with peripheral neuropathy  4. History of Non-compliance  5. Personal History of Nicotine Dependence    PLAN  -Evaluation and management x 20 minutes and greater than 50% of the time spent explaining the etiology and treatment with the patient.   -Total contact cast on left lower extremity removed  -Excisional debridement of wound on the Left foot using a #15 blade and 3 mm currette down to and including dermal layer. Healthy bleeding noted and stopped via compression. <25 cm squared of tissue removed. Patient tolerated procedure without incident.  -Wound on the Left foot dressed with JUN, DSD, cast padding, well padded total contact cast  -Excisional debridement of wound on the Right foot with #15 blade and 3 mm curette down to and including bone with bleeding noted.  Hemostasis achieved with compression.  <25 cm2 debrided. Patient tolerated procedure without incident.  -Wound on the Right foot dressed with betadine, gauze, kerlix and Ace bandage  -Patient educated that due to the extent of involvement with bone being exposed in the Right foot wound that underlying bone infection is present and will require surgical

## 2019-05-24 NOTE — ED PROVIDER NOTES
Tobacco abuse, and Vaginal bleeding, abnormal.  She has a past surgical history that includes  section (unknown); pre-malignant / benign skin lesion excision (7003); other surgical history (Left, 2016); Toe amputation (Left, 2017); other surgical history (Right, 10/20/2017); other surgical history (Right, 2018); pr debridement, skin, sub-q tissue,muscle,bone,=<20 sq cm (Right, 2018); and Foot Debridement (Right, 2019). Her family history is not on file. She reports that she has been smoking cigarettes. She has a 30.00 pack-year smoking history. She has never used smokeless tobacco. She reports that she does not drink alcohol or use drugs. Medications     Previous Medications    ACCU-CHEK SOFTCLIX LANCETS MISC    1 strip by Does not apply route 2 times daily Check before breakfast and before going to bed    ALBUTEROL SULFATE  (90 BASE) MCG/ACT INHALER    Inhale 2 puffs into the lungs every 6 hours as needed for Wheezing    AMITRIPTYLINE (ELAVIL) 100 MG TABLET    Take 1 tablet by mouth nightly    AMMONIUM LACTATE (LAC-HYDRIN) 12 % LOTION    Apply topically daily. AMMONIUM LACTATE (LAC-HYDRIN) 12 % LOTION    Apply topically daily. AMOXICILLIN-CLAVULANATE (AUGMENTIN) 875-125 MG PER TABLET    Take 1 tablet by mouth 2 times daily for 10 days    APIXABAN (ELIQUIS STARTER PACK) 5 MG TABS TABLET    Take 10 mg (2 tablets) orally twice daily for 7 days, then take 5 mg (1 tablet) orally twice daily thereafter. ASPIRIN 325 MG TABLET    Take 1 tablet by mouth daily    ATORVASTATIN (LIPITOR) 20 MG TABLET    Take 1 tablet by mouth daily    BLOOD GLUCOSE TEST STRIPS (TRUE METRIX BLOOD GLUCOSE TEST) STRIP    1 each by In Vitro route 2 times daily As needed. FUROSEMIDE (LASIX) 40 MG TABLET    Take 1 tablet by mouth 2 times daily    GABAPENTIN (NEURONTIN) 800 MG TABLET    Take 1 tablet by mouth 4 times daily for 90 days.     GAUZE PADS & DRESSINGS MISC    Please dispense 4x8 guaze, kerlix, and ace    INSULIN GLARGINE (BASAGLAR KWIKPEN) 100 UNIT/ML INJECTION PEN    Inject 40 Units into the skin 2 times daily    INSULIN LISPRO (HUMALOG) 100 UNIT/ML PEN    Inject 8 Units into the skin 3 times daily (with meals)    INSULIN PEN NEEDLE 31G X 5 MM MISC    1 each by Does not apply route daily    LANCETS MISC    1 each by Does not apply route 2 times daily PHARMACY MAY SUBSTITUTE TO TRUE METRIX LANCETS    METHADONE (DOLOPHINE) 10 MG TABLET    Take 140 mg by mouth daily. Rede Doris METOPROLOL SUCCINATE (TOPROL XL) 50 MG EXTENDED RELEASE TABLET    Take 1 tablet by mouth nightly    OMEPRAZOLE (PRILOSEC) 20 MG DELAYED RELEASE CAPSULE    Take 1 capsule by mouth Daily       Allergies     Sheis allergic to sulfa antibiotics. Physical Exam     INITIAL VITALS: BP: (!) 153/71,Temp: 98.4 °F (36.9 °C), Pulse: 77, Resp: 22, SpO2: 94 %   Physical Exam   Constitutional: She is oriented to person, place, and time. She appears well-developed and well-nourished. No distress. HENT:   Head: Normocephalic and atraumatic. Eyes: EOM are normal. No scleral icterus. Neck: Normal range of motion. No tracheal deviation present. Cardiovascular: Normal rate, regular rhythm, normal heart sounds and intact distal pulses. Exam reveals no gallop and no friction rub. No murmur heard. Pulmonary/Chest: Effort normal and breath sounds normal. No respiratory distress. She has no wheezes. She has no rales. Abdominal: Soft. She exhibits no distension. There is no tenderness. There is no rebound and no guarding. Musculoskeletal: Normal range of motion. She exhibits no edema. BLE foot wounds dressed today so not taken down   Neurological: She is alert and oriented to person, place, and time. No cranial nerve deficit. Skin: Skin is warm. No rash noted. She is not diaphoretic. Psychiatric: She has a normal mood and affect. Her behavior is normal.   Nursing note and vitals reviewed.       Diagnostic Results     EKG   As below    RADIOLOGY:  No orders to display       LABS:   Results for orders placed or performed during the hospital encounter of 05/24/19   CBC Auto Differential   Result Value Ref Range    WBC 11.0 4.0 - 11.0 K/uL    RBC 3.66 (L) 4.00 - 5.20 M/uL    Hemoglobin 10.4 (L) 12.0 - 16.0 g/dL    Hematocrit 32.2 (L) 36.0 - 48.0 %    MCV 88.0 80.0 - 100.0 fL    MCH 28.4 26.0 - 34.0 pg    MCHC 32.2 31.0 - 36.0 g/dL    RDW 15.7 (H) 12.4 - 15.4 %    Platelets 283 362 - 985 K/uL    MPV 8.7 5.0 - 10.5 fL    Neutrophils % 80.3 %    Lymphocytes % 12.8 %    Monocytes % 5.6 %    Eosinophils % 1.0 %    Basophils % 0.3 %    Neutrophils # 8.8 (H) 1.7 - 7.7 K/uL    Lymphocytes # 1.4 1.0 - 5.1 K/uL    Monocytes # 0.6 0.0 - 1.3 K/uL    Eosinophils # 0.1 0.0 - 0.6 K/uL    Basophils # 0.0 0.0 - 0.2 K/uL   Basic Metabolic Panel   Result Value Ref Range    Sodium 127 (L) 136 - 145 mmol/L    Potassium 4.9 3.5 - 5.1 mmol/L    Chloride 89 (L) 99 - 110 mmol/L    CO2 26 21 - 32 mmol/L    Anion Gap 12 3 - 16    Glucose 574 (H) 70 - 99 mg/dL    BUN 32 (H) 7 - 20 mg/dL    CREATININE 1.8 (H) 0.6 - 1.1 mg/dL    GFR Non-African American 29 (A) >60    GFR  35 (A) >60    Calcium 8.8 8.3 - 10.6 mg/dL   Magnesium   Result Value Ref Range    Magnesium 2.40 1.80 - 2.40 mg/dL   Troponin   Result Value Ref Range    Troponin <0.01 <0.01 ng/mL   Basic Metabolic Panel   Result Value Ref Range    Sodium 130 (L) 136 - 145 mmol/L    Potassium 4.4 3.5 - 5.1 mmol/L    Chloride 92 (L) 99 - 110 mmol/L    CO2 28 21 - 32 mmol/L    Anion Gap 10 3 - 16    Glucose 468 (H) 70 - 99 mg/dL    BUN 32 (H) 7 - 20 mg/dL    CREATININE 1.7 (H) 0.6 - 1.1 mg/dL    GFR Non- 31 (A) >60    GFR  38 (A) >60    Calcium 8.5 8.3 - 10.6 mg/dL   POCT Glucose   Result Value Ref Range    POC Glucose 570 (H) 70 - 99 mg/dl    Performed on ACCU-CHEK    POCT Venous   Result Value Ref Range    pH, Brody 7.460 (H) 7.350 - 7.450    pCO2, Brody 37.9 (L) 40.0 - 50.0 mm Hg    pO2, Lavon 53 Not Established mm Hg    HCO3, Venous 26.9 23.0 - 29.0 mmol/L    Base Excess, Lavon 3 -3 - 3    O2 Sat, Lavon 89 Not Established %    TC02 (Calc), Lavon 28 Not Established mmol/L    Lactate 1.29 0.40 - 2.00 mmol/L    Sample Type LAVON     Performed on SEE BELOW    EKG 12 Lead   Result Value Ref Range    Ventricular Rate 71 BPM    Atrial Rate 71 BPM    P-R Interval 166 ms    QRS Duration 90 ms    Q-T Interval 404 ms    QTc Calculation (Bazett) 439 ms    P Axis 44 degrees    R Axis 0 degrees    T Axis 35 degrees    Diagnosis       EKG performed in ER and to be interpreted by ER physician. Confirmed by MD, ER ([id number]),  Alfred Funes ([phone]) on 5/24/2019 6:50:57 PM       ED BEDSIDE ULTRASOUND:  N/A    RECENT VITALS:  BP: (!) 153/71, Temp: 98.4 °F (36.9 °C), Pulse: 77, Resp: 22, SpO2: 94 %     Procedures     N/A    MEDICAL DECISION MAKING / ASSESSMENT / PLAN     Viry Fernandez is a 52 y.o. female with a history of alcohol use disorder, DM2 c/b chronic foot wounds, HTN, HL, and asthma who presents with hyperglycemia. Symptoms and exam most consistent with poorly controlled DM 2/2 medication issues. Other considerations include DKA. Doubt HHS. Doubt other emergent condition. Plan: ECG, labs as below, IVF, likely insulin    ED Course     Nursing Notes, Past Medical Hx, Past Surgical Hx, Social Hx, Allergies, and Family Hx were reviewed.     The patient was given the followingmedications:  Orders Placed This Encounter   Medications    lactated ringers bolus    insulin regular (HUMULIN R;NOVOLIN R) injection 10 Units    lactated ringers bolus       CONSULTS:  None    ED Course as of May 24 2012   Fri May 24, 2019   1727 Elevated   POC Glucose(!): 570 [AZ]   1752 Not c/w DKA   pH, Lavon(!): 7.460 [AZ]   1752 Baseline anemia   Hemoglobin Quant(!): 10.4 [AZ]   1802 Pseudohyponatremia   Sodium(!): 127 [AZ]   1802 Elevated without e/o DKA   Glucose(!): 574 [AZ]   1802 Mild FAHEEM likely pre-renal 2/2 dehydration Creatinine(!): 1.8 [AZ]   1804 Neg   Troponin: <0.01 [AZ]   1811 NSR at 71bpm, no ST/T changes   EKG 12 Lead [AZ]   2008 Improving   Sodium(!): 130 [AZ]   2009 Improving   Glucose(!): 468 [AZ]   2009 Patient remains well-appearing. Suspect hyperglycemia 2/2 poor medication understanding. Will discharge with PCP follow-up. Verbal and written discharge instructions given to and understood by patient.    Chuck Kelly      ED Course User Index  [AZ] Bobby Monique MD       Clinical Impression     1.  Type 2 diabetes mellitus with hyperglycemia, with long-term current use of insulin ([facility]        Disposition     PATIENT REFERRED TO:  Gonzalez Palomo MD  [facility]  [phone]    Schedule an appointment as soon as possible for a visit in 3 days      [facility] Emergency Department  [address] [place] [address]  [phone]  Go to   If symptoms worsen      DISCHARGEMEDICATIONS:  New Prescriptions    No medications on file       DISPOSITION       Bobby Monique MD  05/24/19 2012

## 2019-05-24 NOTE — TELEPHONE ENCOUNTER
Patient in podiatry clinic . Sugar too high to read times 2 . Dr. Monica Schuster sending he r to ED now. Dr. Preethi Cuevas  notified of high sugars and that she will be sent to ED. HE agrees with this plan. patient daughter here and will take her . ER called report.

## 2019-05-31 ENCOUNTER — APPOINTMENT (OUTPATIENT)
Dept: GENERAL RADIOLOGY | Age: 54
DRG: 617 | End: 2019-05-31
Attending: PODIATRIST
Payer: COMMERCIAL

## 2019-05-31 ENCOUNTER — HOSPITAL ENCOUNTER (INPATIENT)
Age: 54
LOS: 7 days | Discharge: NURSING, REHAB OR LONG-TERM CARE | DRG: 617 | End: 2019-06-07
Attending: PODIATRIST | Admitting: PODIATRIST
Payer: COMMERCIAL

## 2019-05-31 ENCOUNTER — OFFICE VISIT (OUTPATIENT)
Dept: INTERNAL MEDICINE CLINIC | Age: 54
DRG: 617 | End: 2019-05-31
Payer: COMMERCIAL

## 2019-05-31 DIAGNOSIS — E11.621 DIABETIC ULCER OF RIGHT FOOT ASSOCIATED WITH TYPE 2 DIABETES MELLITUS, WITH BONE INVOLVEMENT WITHOUT EVIDENCE OF NECROSIS, UNSPECIFIED PART OF FOOT (HCC): Primary | ICD-10-CM

## 2019-05-31 DIAGNOSIS — L97.522 DIABETIC ULCER OF LEFT FOOT ASSOCIATED WITH TYPE 2 DIABETES MELLITUS, WITH FAT LAYER EXPOSED, UNSPECIFIED PART OF FOOT (HCC): ICD-10-CM

## 2019-05-31 DIAGNOSIS — L97.516 DIABETIC ULCER OF RIGHT FOOT ASSOCIATED WITH TYPE 2 DIABETES MELLITUS, WITH BONE INVOLVEMENT WITHOUT EVIDENCE OF NECROSIS, UNSPECIFIED PART OF FOOT (HCC): Primary | ICD-10-CM

## 2019-05-31 DIAGNOSIS — E11.621 DIABETIC ULCER OF LEFT FOOT ASSOCIATED WITH TYPE 2 DIABETES MELLITUS, WITH FAT LAYER EXPOSED, UNSPECIFIED PART OF FOOT (HCC): ICD-10-CM

## 2019-05-31 PROBLEM — L97.519 DIABETIC ULCER OF RIGHT FOOT DUE TO TYPE 2 DIABETES MELLITUS (HCC): Status: ACTIVE | Noted: 2019-05-31

## 2019-05-31 LAB
ANION GAP SERPL CALCULATED.3IONS-SCNC: 12 MMOL/L (ref 3–16)
BASOPHILS ABSOLUTE: 0 K/UL (ref 0–0.2)
BASOPHILS RELATIVE PERCENT: 0.5 %
BUN BLDV-MCNC: 20 MG/DL (ref 7–20)
C-REACTIVE PROTEIN: 22.3 MG/L (ref 0–5.1)
CALCIUM SERPL-MCNC: 8.9 MG/DL (ref 8.3–10.6)
CHLORIDE BLD-SCNC: 97 MMOL/L (ref 99–110)
CO2: 26 MMOL/L (ref 21–32)
CREAT SERPL-MCNC: 1.2 MG/DL (ref 0.6–1.1)
EOSINOPHILS ABSOLUTE: 0.2 K/UL (ref 0–0.6)
EOSINOPHILS RELATIVE PERCENT: 2.6 %
GFR AFRICAN AMERICAN: 56
GFR NON-AFRICAN AMERICAN: 46
GLUCOSE BLD-MCNC: 353 MG/DL (ref 70–99)
GLUCOSE BLD-MCNC: 396 MG/DL (ref 70–99)
GLUCOSE BLD-MCNC: 414 MG/DL (ref 70–99)
HCT VFR BLD CALC: 32.8 % (ref 36–48)
HEMOGLOBIN: 10.7 G/DL (ref 12–16)
LYMPHOCYTES ABSOLUTE: 2.4 K/UL (ref 1–5.1)
LYMPHOCYTES RELATIVE PERCENT: 26.1 %
MCH RBC QN AUTO: 28.6 PG (ref 26–34)
MCHC RBC AUTO-ENTMCNC: 32.8 G/DL (ref 31–36)
MCV RBC AUTO: 87.3 FL (ref 80–100)
MONOCYTES ABSOLUTE: 0.5 K/UL (ref 0–1.3)
MONOCYTES RELATIVE PERCENT: 5.2 %
NEUTROPHILS ABSOLUTE: 6.1 K/UL (ref 1.7–7.7)
NEUTROPHILS RELATIVE PERCENT: 65.6 %
PDW BLD-RTO: 15.6 % (ref 12.4–15.4)
PERFORMED ON: ABNORMAL
PERFORMED ON: ABNORMAL
PLATELET # BLD: 496 K/UL (ref 135–450)
PMV BLD AUTO: 7.7 FL (ref 5–10.5)
POTASSIUM SERPL-SCNC: 5.4 MMOL/L (ref 3.5–5.1)
RBC # BLD: 3.76 M/UL (ref 4–5.2)
SEDIMENTATION RATE, ERYTHROCYTE: 114 MM/HR (ref 0–30)
SODIUM BLD-SCNC: 135 MMOL/L (ref 136–145)
WBC # BLD: 9.4 K/UL (ref 4–11)

## 2019-05-31 PROCEDURE — 87186 SC STD MICRODIL/AGAR DIL: CPT

## 2019-05-31 PROCEDURE — 80048 BASIC METABOLIC PNL TOTAL CA: CPT

## 2019-05-31 PROCEDURE — 6360000002 HC RX W HCPCS: Performed by: STUDENT IN AN ORGANIZED HEALTH CARE EDUCATION/TRAINING PROGRAM

## 2019-05-31 PROCEDURE — 85025 COMPLETE CBC W/AUTO DIFF WBC: CPT

## 2019-05-31 PROCEDURE — 94760 N-INVAS EAR/PLS OXIMETRY 1: CPT

## 2019-05-31 PROCEDURE — 6370000000 HC RX 637 (ALT 250 FOR IP): Performed by: STUDENT IN AN ORGANIZED HEALTH CARE EDUCATION/TRAINING PROGRAM

## 2019-05-31 PROCEDURE — 86140 C-REACTIVE PROTEIN: CPT

## 2019-05-31 PROCEDURE — 87070 CULTURE OTHR SPECIMN AEROBIC: CPT

## 2019-05-31 PROCEDURE — 1200000000 HC SEMI PRIVATE

## 2019-05-31 PROCEDURE — 36415 COLL VENOUS BLD VENIPUNCTURE: CPT

## 2019-05-31 PROCEDURE — 85652 RBC SED RATE AUTOMATED: CPT

## 2019-05-31 PROCEDURE — 94150 VITAL CAPACITY TEST: CPT

## 2019-05-31 PROCEDURE — 83036 HEMOGLOBIN GLYCOSYLATED A1C: CPT

## 2019-05-31 PROCEDURE — 87077 CULTURE AEROBIC IDENTIFY: CPT

## 2019-05-31 PROCEDURE — 73630 X-RAY EXAM OF FOOT: CPT

## 2019-05-31 PROCEDURE — 87205 SMEAR GRAM STAIN: CPT

## 2019-05-31 PROCEDURE — 99213 OFFICE O/P EST LOW 20 MIN: CPT | Performed by: STUDENT IN AN ORGANIZED HEALTH CARE EDUCATION/TRAINING PROGRAM

## 2019-05-31 PROCEDURE — 2580000003 HC RX 258: Performed by: STUDENT IN AN ORGANIZED HEALTH CARE EDUCATION/TRAINING PROGRAM

## 2019-05-31 PROCEDURE — 29405 APPL SHORT LEG CAST: CPT

## 2019-05-31 RX ORDER — OXYCODONE HYDROCHLORIDE AND ACETAMINOPHEN 5; 325 MG/1; MG/1
2 TABLET ORAL EVERY 6 HOURS PRN
Status: DISCONTINUED | OUTPATIENT
Start: 2019-05-31 | End: 2019-06-05

## 2019-05-31 RX ORDER — OXYCODONE HYDROCHLORIDE AND ACETAMINOPHEN 5; 325 MG/1; MG/1
1 TABLET ORAL EVERY 6 HOURS PRN
Status: DISCONTINUED | OUTPATIENT
Start: 2019-05-31 | End: 2019-06-05

## 2019-05-31 RX ORDER — DEXTROSE MONOHYDRATE 50 MG/ML
100 INJECTION, SOLUTION INTRAVENOUS PRN
Status: DISCONTINUED | OUTPATIENT
Start: 2019-05-31 | End: 2019-06-07 | Stop reason: HOSPADM

## 2019-05-31 RX ORDER — ONDANSETRON 2 MG/ML
4 INJECTION INTRAMUSCULAR; INTRAVENOUS EVERY 6 HOURS PRN
Status: DISCONTINUED | OUTPATIENT
Start: 2019-05-31 | End: 2019-06-07 | Stop reason: HOSPADM

## 2019-05-31 RX ORDER — FUROSEMIDE 40 MG/1
40 TABLET ORAL 2 TIMES DAILY
Status: DISCONTINUED | OUTPATIENT
Start: 2019-05-31 | End: 2019-06-07 | Stop reason: HOSPADM

## 2019-05-31 RX ORDER — METOPROLOL SUCCINATE 50 MG/1
50 TABLET, EXTENDED RELEASE ORAL NIGHTLY
Status: DISCONTINUED | OUTPATIENT
Start: 2019-05-31 | End: 2019-06-07 | Stop reason: HOSPADM

## 2019-05-31 RX ORDER — SODIUM CHLORIDE 9 MG/ML
INJECTION, SOLUTION INTRAVENOUS
Status: DISPENSED
Start: 2019-05-31 | End: 2019-06-01

## 2019-05-31 RX ORDER — PANTOPRAZOLE SODIUM 40 MG/1
40 TABLET, DELAYED RELEASE ORAL
Status: DISCONTINUED | OUTPATIENT
Start: 2019-06-01 | End: 2019-06-07 | Stop reason: HOSPADM

## 2019-05-31 RX ORDER — DEXTROSE MONOHYDRATE 25 G/50ML
12.5 INJECTION, SOLUTION INTRAVENOUS PRN
Status: DISCONTINUED | OUTPATIENT
Start: 2019-05-31 | End: 2019-05-31

## 2019-05-31 RX ORDER — GABAPENTIN 400 MG/1
800 CAPSULE ORAL 4 TIMES DAILY
Status: DISCONTINUED | OUTPATIENT
Start: 2019-05-31 | End: 2019-06-07 | Stop reason: HOSPADM

## 2019-05-31 RX ORDER — ACETAMINOPHEN 500 MG
500 TABLET ORAL EVERY 6 HOURS PRN
Status: DISCONTINUED | OUTPATIENT
Start: 2019-05-31 | End: 2019-06-07 | Stop reason: HOSPADM

## 2019-05-31 RX ORDER — NALOXONE HYDROCHLORIDE 0.4 MG/ML
0.4 INJECTION, SOLUTION INTRAMUSCULAR; INTRAVENOUS; SUBCUTANEOUS PRN
Status: DISCONTINUED | OUTPATIENT
Start: 2019-05-31 | End: 2019-06-07 | Stop reason: HOSPADM

## 2019-05-31 RX ORDER — DIPHENHYDRAMINE HYDROCHLORIDE 50 MG/ML
25 INJECTION INTRAMUSCULAR; INTRAVENOUS EVERY 6 HOURS PRN
Status: DISCONTINUED | OUTPATIENT
Start: 2019-05-31 | End: 2019-06-05

## 2019-05-31 RX ORDER — METHADONE HYDROCHLORIDE 10 MG/ML
140 CONCENTRATE ORAL DAILY
Status: DISCONTINUED | OUTPATIENT
Start: 2019-06-01 | End: 2019-06-06

## 2019-05-31 RX ORDER — DEXTROSE MONOHYDRATE 25 G/50ML
12.5 INJECTION, SOLUTION INTRAVENOUS PRN
Status: DISCONTINUED | OUTPATIENT
Start: 2019-05-31 | End: 2019-06-07 | Stop reason: HOSPADM

## 2019-05-31 RX ORDER — AMITRIPTYLINE HYDROCHLORIDE 50 MG/1
100 TABLET, FILM COATED ORAL NIGHTLY
Status: DISCONTINUED | OUTPATIENT
Start: 2019-05-31 | End: 2019-06-07 | Stop reason: HOSPADM

## 2019-05-31 RX ORDER — ATORVASTATIN CALCIUM 20 MG/1
20 TABLET, FILM COATED ORAL NIGHTLY
Status: DISCONTINUED | OUTPATIENT
Start: 2019-05-31 | End: 2019-06-07 | Stop reason: HOSPADM

## 2019-05-31 RX ORDER — ALBUTEROL SULFATE 2.5 MG/3ML
2.5 SOLUTION RESPIRATORY (INHALATION) EVERY 6 HOURS PRN
Status: DISCONTINUED | OUTPATIENT
Start: 2019-05-31 | End: 2019-06-07 | Stop reason: HOSPADM

## 2019-05-31 RX ORDER — PROMETHAZINE HYDROCHLORIDE 12.5 MG/1
12.5 TABLET ORAL EVERY 6 HOURS PRN
Status: DISCONTINUED | OUTPATIENT
Start: 2019-05-31 | End: 2019-06-07 | Stop reason: HOSPADM

## 2019-05-31 RX ORDER — NICOTINE POLACRILEX 4 MG
15 LOZENGE BUCCAL PRN
Status: DISCONTINUED | OUTPATIENT
Start: 2019-05-31 | End: 2019-06-07 | Stop reason: HOSPADM

## 2019-05-31 RX ORDER — DOCUSATE SODIUM 100 MG/1
100 CAPSULE, LIQUID FILLED ORAL 2 TIMES DAILY PRN
Status: DISCONTINUED | OUTPATIENT
Start: 2019-05-31 | End: 2019-06-07 | Stop reason: HOSPADM

## 2019-05-31 RX ADMIN — INSULIN GLARGINE 40 UNITS: 100 INJECTION, SOLUTION SUBCUTANEOUS at 20:16

## 2019-05-31 RX ADMIN — AMITRIPTYLINE HYDROCHLORIDE 100 MG: 50 TABLET, FILM COATED ORAL at 20:12

## 2019-05-31 RX ADMIN — OXYCODONE HYDROCHLORIDE AND ACETAMINOPHEN 2 TABLET: 5; 325 TABLET ORAL at 18:19

## 2019-05-31 RX ADMIN — INSULIN LISPRO 6 UNITS: 100 INJECTION, SOLUTION INTRAVENOUS; SUBCUTANEOUS at 18:19

## 2019-05-31 RX ADMIN — GABAPENTIN 800 MG: 400 CAPSULE ORAL at 18:19

## 2019-05-31 RX ADMIN — GABAPENTIN 800 MG: 400 CAPSULE ORAL at 20:13

## 2019-05-31 RX ADMIN — INSULIN LISPRO 3 UNITS: 100 INJECTION, SOLUTION INTRAVENOUS; SUBCUTANEOUS at 20:16

## 2019-05-31 RX ADMIN — FUROSEMIDE 40 MG: 40 TABLET ORAL at 18:20

## 2019-05-31 RX ADMIN — APIXABAN 5 MG: 5 TABLET, FILM COATED ORAL at 20:13

## 2019-05-31 RX ADMIN — INSULIN LISPRO 8 UNITS: 100 INJECTION, SOLUTION INTRAVENOUS; SUBCUTANEOUS at 18:22

## 2019-05-31 RX ADMIN — METOPROLOL SUCCINATE 50 MG: 50 TABLET, EXTENDED RELEASE ORAL at 20:13

## 2019-05-31 RX ADMIN — ATORVASTATIN CALCIUM 20 MG: 20 TABLET, FILM COATED ORAL at 20:13

## 2019-05-31 RX ADMIN — VANCOMYCIN HYDROCHLORIDE 1500 MG: 10 INJECTION, POWDER, LYOPHILIZED, FOR SOLUTION INTRAVENOUS at 19:00

## 2019-05-31 RX ADMIN — DOCUSATE SODIUM 100 MG: 100 CAPSULE, LIQUID FILLED ORAL at 20:13

## 2019-05-31 RX ADMIN — PIPERACILLIN SODIUM,TAZOBACTAM SODIUM 3.38 G: 3; .375 INJECTION, POWDER, FOR SOLUTION INTRAVENOUS at 21:00

## 2019-05-31 ASSESSMENT — PAIN DESCRIPTION - FREQUENCY: FREQUENCY: CONTINUOUS

## 2019-05-31 ASSESSMENT — PAIN SCALES - GENERAL
PAINLEVEL_OUTOF10: 3
PAINLEVEL_OUTOF10: 7
PAINLEVEL_OUTOF10: 5
PAINLEVEL_OUTOF10: 6

## 2019-05-31 ASSESSMENT — ENCOUNTER SYMPTOMS
ABDOMINAL PAIN: 0
PHOTOPHOBIA: 0
CHEST TIGHTNESS: 0
VOMITING: 0
WHEEZING: 0
NAUSEA: 0
EYE DISCHARGE: 0
ABDOMINAL DISTENTION: 0
SHORTNESS OF BREATH: 0
SINUS PAIN: 0
CONSTIPATION: 0
SINUS PRESSURE: 0

## 2019-05-31 ASSESSMENT — PAIN DESCRIPTION - PROGRESSION: CLINICAL_PROGRESSION: GRADUALLY WORSENING

## 2019-05-31 ASSESSMENT — PAIN DESCRIPTION - ORIENTATION: ORIENTATION: RIGHT

## 2019-05-31 ASSESSMENT — PAIN DESCRIPTION - PAIN TYPE: TYPE: ACUTE PAIN

## 2019-05-31 ASSESSMENT — PAIN DESCRIPTION - DESCRIPTORS: DESCRIPTORS: ACHING

## 2019-05-31 ASSESSMENT — PAIN DESCRIPTION - ONSET: ONSET: ON-GOING

## 2019-05-31 ASSESSMENT — PAIN DESCRIPTION - LOCATION: LOCATION: FOOT

## 2019-05-31 NOTE — PROGRESS NOTES
Clinical Pharmacy Progress Note    New order for Zosyn 3.375 g IV q 6h. Per policy will interchange to extended infusion therapy. Will start Zosyn 3.375 gm IV Q 8 hrs each dose infused over 4 hours. This therapy is appropriate for patients with ClCr> 20 ml/min. If extended infusion dosing is not wanted, please discontinue and restart traditional dosing. Please call pharmacy with any questions. Kadeem Wagner. Jody Brambila. NESTOR   5/31/2019 6:17 PM

## 2019-05-31 NOTE — CONSULTS
Internal Medicine    History & Physical        Chief Complaint: right foot pain  Reason for consult: Medical management of diabetes, Pre Op Evaluation   Consulting physician: Dr. Marciano Spivey    History Obtained From:  patient    HISTORY OF PRESENT ILLNESS:      The patient is a 52 y.o.  female presents with DM2, HTN, Bilateral diabetic foot ulcers and DVT presenting as a direct admit from podiatry clinic. Patient was being seen for a diabetic foot ulcer that was not improving and was admitted for likely Osteomyelitis. Patient otherwise denies any headaches, lightheadedness, chest pain, Palpitation, SOB,  abdominal pain, Dyspnea on exertion, lower extremity swelling. Patient's blood sugar on arrival was in the 400s patient did not take any of her insulin today. Patient usually has blood sugars 100-200 with her home regimen. However patient does on occasion miss her insulin doses.      Past Medical History:        Diagnosis Date    Amenorrhea     Anomalies of nails     Asthma 04    Athlete's foot 2010    Bacterial vaginosis 2008    Carpal tunnel syndrome 2007    [facility]     Diabetes mellitus type II 2007    Diabetic neuropathy ([facility] 8/10    DVT (deep venous thrombosis) ([facility] 3/2004    Dyslipidemia 2009    Dyspareunia 2009    ETOH abuse 3/04/2007    Feet clawing     HTN (hypertension)     Hx of blood clots     Hyperlipidemia     MRSA (methicillin resistant staph aureus) culture positive 2017; 2017    foot; leg     Neuropathy 2009    polyneuropathy    Pain, back 2008    Pain, eye, right 04    Pancreatitis 04    Pseudocyst, pancreas 04    Scalp lesion 2007    Tinea pedis     Tobacco abuse 2008    Vaginal bleeding, abnormal 2007       Past Surgical History:      Procedure Laterality Date     SECTION  unknown    FOOT DEBRIDEMENT Right 2019    INCISION AND DRAINAGE WITH APPLICATION OF STRAVIX GRAFT RIGHT FOOT performed by Sumaya Murphy DPM at [address] [facility] Left 05/25/2016    I & D left foot    OTHER SURGICAL HISTORY Right 10/20/2017    RIGHT GASTROC LENGTHENING ENDOSCOPIC, INJECTION OF AMNI GRAFT    OTHER SURGICAL HISTORY Right 04/26/2018    Diabetic foot ulcer I&D w/ integra graft application    IN DEBRIDEMENT, SKIN, SUB-Q TISSUE,MUSCLE,BONE,=<20 SQ CM Right 8/17/2018    RIGHT FOOT DEBRIDEMENT INCISION AND DRAINAGE, PARTIAL 5TH RAY AMPUTATION performed by Sumaya Murphy DPM at [address] [facility] / [facility]  7/7003    cryotherapy done on lesion    TOE AMPUTATION Left 02/24/2017    AMPUTATION LEFT GREAT TOE                    Medications Prior toAdmission:    Prior to Admission medications    Medication Sig Start Date End Date Taking? Authorizing Provider   amoxicillin-clavulanate (AUGMENTIN) 875-125 MG per tablet Take 1 tablet by mouth 2 times daily for 10 days 5/24/19 6/3/19 Yes Aracelis Bach DPM   gabapentin (NEURONTIN) 800 MG tablet Take 1 tablet by mouth 4 times daily for 90 days. 5/7/19 8/5/19 Yes Nerissa Morgan DO   amitriptyline (ELAVIL) 100 MG tablet Take 1 tablet by mouth nightly 5/6/19  Yes Piero Huerta MD   apixaban (ELIQUIS STARTER PACK) 5 MG TABS tablet Take 10 mg (2 tablets) orally twice daily for 7 days, then take 5 mg (1 tablet) orally twice daily thereafter.  5/2/19  Yes Tip To MD   insulin lispro (HUMALOG) 100 UNIT/ML pen Inject 8 Units into the skin 3 times daily (with meals) 3/28/19  Yes Brynn Hare MD   insulin glargine (BASAGLAR KWIKPEN) 100 UNIT/ML injection pen Inject 40 Units into the skin 2 times daily 3/27/19  Yes Brynn Hare MD   metoprolol succinate (TOPROL XL) 50 MG extended release tablet Take 1 tablet by mouth nightly 3/27/19  Yes Brynn Hare MD   aspirin 325 MG tablet Take 1 tablet by mouth daily 2/19/19  Yes Georgina Mack MD   albuterol sulfate  (90 Base) MCG/ACT inhaler Inhale 2 puffs into the lungs every 6 hours as needed for Wheezing 2/19/19  Yes Georgina Mack MD   omeprazole (PRILOSEC) 20 MG delayed release capsule Take 1 capsule by mouth Daily 2/19/19  Yes Georgina Mack MD   atorvastatin (LIPITOR) 20 MG tablet Take 1 tablet by mouth daily 2/19/19  Yes Georgina Mack MD   furosemide (LASIX) 40 MG tablet Take 1 tablet by mouth 2 times daily 2/11/19  Yes Georgina Mack MD   methadone (DOLOPHINE) 10 MG tablet Take 140 mg by mouth daily. .   Yes Historical Provider, MD   ammonium lactate (LAC-HYDRIN) 12 % lotion Apply topically daily. 5/17/19   Aracelis Bach DPM   Lancets MISC 1 each by Does not apply route 2 times daily PHARMACY MAY SUBSTITUTE TO TRUE METRIX LANCETS 2/19/19   Georgina Mack MD   Insulin Pen Needle 31G X 5 MM MISC 1 each by Does not apply route daily 2/19/19   Georgina Mack MD   blood glucose test strips (TRUE METRIX BLOOD GLUCOSE TEST) strip 1 each by In Vitro route 2 times daily As needed. 7/23/18   Georgina Mack MD   Gauze Pads & Dressings MISC Please dispense 4x8 guaze, kerlix, and ace 2/23/18   Blas Sal DPM   ammonium lactate (LAC-HYDRIN) 12 % lotion Apply topically daily. Patient taking differently: as needed Apply topically daily. 1/29/18   Blas Sal DPM   Accu-Chek Softclix Lancets MISC 1 strip by Does not apply route 2 times daily Check before breakfast and before going to bed 4/20/17   Georgina Mack MD       Allergies:  Sulfa antibiotics    Social History:   TOBACCO:   reports that she has been smoking cigarettes. She has a 30.00 pack-year smoking history. She has never used smokeless tobacco.  ETOH:   reports that she does not drink alcohol. Family History:   No family history on file. REVIEW OF SYSTEMS:  Review of Systems   Constitutional: Negative for activity change, appetite change, diaphoresis, fatigue and fever. HENT: Negative for sinus pressure and sinus pain.     Eyes: Negative for photophobia and discharge. Respiratory: Negative for chest tightness, shortness of breath and wheezing. Cardiovascular: Negative for chest pain, palpitations and leg swelling. Gastrointestinal: Negative for abdominal distention, abdominal pain, constipation, nausea and vomiting. Endocrine: Negative for cold intolerance, polydipsia and polyuria. Genitourinary: Negative for difficulty urinating, dyspareunia, frequency and urgency. Musculoskeletal: Negative for arthralgias. Skin: Positive for wound (foot ulcers on both feet). Neurological: Negative for dizziness, light-headedness and headaches. Hematological: Negative for adenopathy. Psychiatric/Behavioral: Negative for agitation. PHYSICAL EXAM:  BP (!) 171/82   Pulse 74   Temp 98 °F (36.7 °C) (Oral)   Resp 17   SpO2 95%   General appearance: alert, appears stated age and cooperative  HEENT: Head: Normocephalic, no lesions, without obvious abnormality. Neck:no adenopathy and supple, symmetrical, trachea midline  Lungs: clear to auscultation bilaterally  Heart: regular rate and rhythm, S1, S2 normal, no murmur, click, rub or gallop  Abdomen: soft, non-tender; bowel sounds normal; no masses,  no organomegaly  Extremities: edema None and diabetic foot ulcers  Skin: Skin color, texture, turgor normal. No rashes or lesions.   Lymphatic: No significant lymph node enlargement papable  Neurologic: Mental status: Alert, oriented, thought content appropriate        DATA:  Old records have beenrequested    XR FOOT RIGHT (MIN 3 VIEWS)    (Results Pending)   MRI FOOT RIGHT WO CONTRAST    (Results Pending)         CBC with Differential:  Lab Results   Component Value Date    WBC 9.4 05/31/2019    RBC 3.76 05/31/2019    HGB 10.7 05/31/2019    HCT 32.8 05/31/2019     05/31/2019    MCV 87.3 05/31/2019    SEGSPCT 63.3 10/31/2012    BANDSPCT 7.0 07/15/2011    LYMPHOPCT 26.1 05/31/2019    EOSPCT 1.8 07/22/2011     BMP:   Lab Results   Component Value Date     05/24/2019    K 4.4 05/24/2019    K 4.2 03/28/2019    CL 92 05/24/2019    CO2 28 05/24/2019    BUN 32 05/24/2019    CREATININE 1.7 05/24/2019    CALCIUM 8.5 05/24/2019    GFRAA 38 05/24/2019    GFRAA 98 10/31/2012    LABGLOM 31 05/24/2019    GLUCOSE 468 05/24/2019      Labs Reviewed   CBC WITH AUTO DIFFERENTIAL - Abnormal; Notable for the following components:       Result Value    RBC 3.76 (*)     Hemoglobin 10.7 (*)     Hematocrit 32.8 (*)     RDW 15.6 (*)     Platelets 900 (*)     All other components within normal limits    Narrative:     Performed at: [facility] - [address], [facility] [place]   Phone [phone]   POCT GLUCOSE - Abnormal; Notable for the following components:    POC Glucose 414 (*)     All other components within normal limits    Narrative:     Performed at:   [facility] - [place] [address] [facility], [facility] [place]   Phone [phone]   SEDIMENTATION RATE   C-REACTIVE PROTEIN   HEMOGLOBIN C5F   BASIC METABOLIC PANEL   POCT GLUCOSE     Hepatic Function Panel:   Lab Results   Component Value Date    ALKPHOS 211 01/04/2019    ALT 23 01/04/2019    AST 26 01/04/2019    PROT 6.8 01/04/2019    PROT 6.6 07/21/2011    BILITOT <0.2 01/04/2019    BILIDIR <0.2 03/27/2018    IBILI see below 03/27/2018     Magnesium:    Lab Results   Component Value Date    MG 2.40 05/24/2019     PT/INR:  No results found for: PTINR    U/A:   Lab Results   Component Value Date    COLORU Yellow 05/24/2019    WBCUA 10-20 05/24/2019    RBCUA 10-20 05/24/2019    MUCUS 1+ 04/29/2014    BACTERIA 2+ 05/24/2019    CLARITYU SL CLOUDY 05/24/2019    SPECGRAV 1.020 05/24/2019    LEUKOCYTESUR TRACE 05/24/2019    BLOODU MODERATE 05/24/2019    GLUCOSEU >=1000 05/24/2019     ABG:    Lab Results   Component Value Date    QEN9SMW 36 07/19/2011    BEART 10.8 07/19/2011    PHART 7.48 07/19/2011    GPP6VZX 49 07/19/2011    PO2ART 59 07/19/2011       Urine Culture:  No components found for: CURINE  Blood Culture:  No components found for: CBLOOD, CFUNGUSBL  Stool Culture: No components found for: CSTOOL  Sputum Culture:  No components found for: CSPUTUM    ASSESSMENT/PLAN:  Patient is 51 y/o female with PMHx of Diabetes, diabetic foot ulcer, HLD presenting to [facility] as a direct admit from podiatry clinic with concerns for Osteomyelitis. Internal medicine consulted for medical management of co morbidities. Osteomyelitis 4 metatarsal head  - Patient was admitted for likely Osteomyelitis   - On Vanc/Zosyn per Podiatry  - ID consulted per podiatry  - MRI pending  - Patient is currently without chest pain or dyspnea on exertion. Activity is limited due to pain but otherwise take care of herself without issue.   - RCRI 1 and NSQIP showing below average risk  - Patient would be moderate risk for intermediate risk surgery but may proceed with surgery. DM2  - Patient is hyperglycemic in the 400s on arrival to [facility]   - Patient did not take her insulin today and started eating dinner before having her blood sugar checked.    - Patient restarted on home Lantus 40 units BID  - Premeal insulin 8 units  - Low dose sliding scale  - Hypo glycemic protocol    History of DVT  - Continue on Eliquis 5 mg BID    CHFpEF compensated  - On toprol XL 50 mg  - continue Lasix 40 mg BID  - Nuclear stress test performed in 8-16/2018 negative for ischemia and EF 63%    Discussed with attending Mary Pierson PGY-3  [phone]

## 2019-05-31 NOTE — PROGRESS NOTES
REVIEW OF SYSTEMS:  CONSTITUTIONAL:  negative for  fevers and chills  RESPIRATORY:  negative for  dyspnea  CARDIOVASCULAR:  negative for  chest pain  GASTROINTESTINAL:  negative for nausea, vomiting and abdominal pain  INTEGUMENT/BREAST:  positive for skin color change and open wounds to bilateral feet  MUSCULOSKELETAL:  negative for  myalgias, arthralgias, pain and joint swelling    OBJECTIVE    VASCULAR: DP and PT pulses are palpable 1/4 b/l. CFT is brisk to the remaining digits of the foot, b/l. Skin temperature is warm to warm from proximal to distal with no focal calor noted, Right lower extremity. No edema noted, Right lower extremity. No pain with calf compression, Right lower extremity.      NEUROLOGIC: Gross and epicritic sensation is diminished b/l. Protective sensation is diminished at all pedal sites b/l.     DERMATOLOGIC: Wound noted to plantar aspect Right foot sub 4th metatarsal head. Wound measures  3.2 x 2.4 x 0.6 cm. Periwound maceration noted sub 4th metatarsal region, Right foot. Purulent and serous drainage noted. Wound does probe to bone. Wound does track to dorsum of Right foot and does undermine circumferentially approximately 1 cm in diameter. No malodor noted. Xerosis noted to the Right foot. Wound noted to Left foot sub 1st metatarsal head, wound measures 1.8 x 1.8 x 0.2 cm. Wound base is fibrotic with periwound hyperkeratosis noted. No drainage noted. No malodor noted. Wound does not probe, track or undermine. Malodor noted. Wound noted to the lateral aspect of the Left 4th digit at the level of the PIPJ. Wound measures <0.5 cm in diameter. Wound bed is fibrotic. Wound does not probe, track or undermine.              MUSCULOSKELETAL: Muscle strength is 5/5 for all pedal groups tested. Pain with palpation to the plantar aspect of the Right foot at the level of the wound. 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 ASSESSMENT  1. Neuropathic ulceration sub 4th metatarsal head, Right foot 2/2 pressure-Carpenter grade III  2. Neuropathic ulceration 2/2 DM, sub 1st metatarsal head, Left foot-Carpenter grade I  3. Neuropathic ulcerated 2/2 pressure lateral aspect of 4th digit at the level of the PIPJ, Left foot-Carpenter grade I  4. DM Type II uncontrolled with peripheral neuropathy  5. History of Non-compliance  6. Personal History of Nicotine Dependence    PLAN  -Evaluation and management x 20 minutes and greater than 50% of the time spent explaining the etiology and treatment with the patient.   -Total contact cast on left lower extremity removed  -Excisional debridement of wound on the Left foot using a #15 blade and 3 mm currette down to and including dermal layer. Healthy bleeding noted and stopped via compression. <25 cm squared of tissue removed. Patient tolerated procedure without incident.  -Wound on the Left foot dressed with JUN, DSD, and Ace bandage.  -Post op shoe dispensed for Left foot. Patient is heel weightbearing as tolerated on Left foot  -Excisional debridement of wound on the Right foot with #15 blade and 3 mm curette down to and including bone with bleeding noted. Hemostasis achieved with compression.  <25 cm2 debrided. Patient tolerated procedure without incident.  -Wound on the Right foot dressed with betadine, gauze, kerlix and Ace bandage  -Patient educated that due to the extent of involvement with bone being exposed in the Right foot wound that underlying bone infection is present and will require an admission to the hospital for IV antibiotics and planned surgical intervention pending MRI findings.  Patient is in understanding of this.  -Patient educated that due to extent of wound with probing to bone and tracking, will order an MRI of the Right foot during hospital admission to rule out further soft tissue or bone involvement to the remaining Right foot  -Patient educated that she will require medical clearance prior to surgery and hospitalist will be consulted during her in house admission for medical management and surgical clearance  -Patient educated on risks vs. Benefits of partial 4th ray amputation of the Right foot vs. Transmetatarsal amputation to create a more functionale braceable foot and prevent further wound breakdown. Patient is in understanding of this and will be guided by MRI Right foot  -Wound culture obtained at this time of the Right foot. Will follow up C&S while patient is in house  -Patient educated on importance of strict heel weightbearing in post-op shoe to the Left lower extremity. Patient is in understanding of this.  -Patient instructed she is weightbearing as tolerated in CAM boot to the Right lower extremity at all times  -Patient educated on importance of smoking cessation and the deleterious affects of nicotine on vasculature and inhibiting wound healing potential. Patient is in understanding of this.   -Due to concern for underling Right foot infection with bone involvement, patient was directly admitted to [facility], [facility]. for IV antibiotics, and planned surgical intervention    Jefferson Beth DPM PGY-1  Pager: [phone]

## 2019-05-31 NOTE — PROGRESS NOTES
WITH APPLICATION OF STRAVIX GRAFT RIGHT FOOT performed by Jose Cruz Bonner DPM at [address] Left 05/25/2016    I & D left foot    OTHER SURGICAL HISTORY Right 10/20/2017    RIGHT GASTROC LENGTHENING ENDOSCOPIC, INJECTION OF AMNI GRAFT    OTHER SURGICAL HISTORY Right 04/26/2018    Diabetic foot ulcer I&D w/ integra graft application    DE DEBRIDEMENT, SKIN, SUB-Q TISSUE,MUSCLE,BONE,=<20 SQ CM Right 8/17/2018    RIGHT FOOT DEBRIDEMENT INCISION AND DRAINAGE, PARTIAL 5TH RAY AMPUTATION performed by Jose Cruz Bonner DPM at [address] / [address]  7/7003    cryotherapy done on lesion    TOE AMPUTATION Left 02/24/2017    AMPUTATION LEFT GREAT TOE                    Level of Consciousness: Alert, Oriented, and Cooperative = 0    Level of Activity: Mostly sedentary, minimal walking = 2    Respiratory Pattern: Regular Pattern; RR 8-20 = 0    Breath Sounds: Clear = 0    Sputum   ,  ,    Cough: Strong, spontaneous, non-productive = 0    Vital Signs   BP (!) 171/82   Pulse 74   Temp 98 °F (36.7 °C) (Oral)   Resp 17   SpO2 95%   SPO2 (COPD values may differ): Greater than or equal to 92% on room air = 0    Peak Flow (asthma only): not applicable = 0    RSI: 5-6 = Q4hr PRN (every four hours as needed) for dyspnea        Plan       Goals: medication delivery    Patient/caregiver was educated on the proper method of use for Respiratory Care Devices:  Yes      Level of patient/caregiver understanding able to:   ? Verbalize understanding   ? Demonstrate understanding       ? Teach back        ? Needs reinforcement       ? No available caregiver               ? Other:     Response to education:  Very Good     Is patient being placed on Home Treatment Regimen? Yes     Does the patient have everything they need prior to discharge? Yes     Comments: Patient states that she rarely uses Albuterol. Plan of Care:  Albuterol HHN Q6h PRN    Electronically signed by Jaylon Cedeno KATHLEEN Zarate on 5/31/2019 at 3:57 PM    Respiratory Protocol Guidelines     1. Assessment and treatment by Respiratory Therapy will be initiated for medication and therapeutic interventions upon initiation of aerosolized medication. 2. Physician will be contacted for respiratory rate (RR) greater than 35 breaths per minute. Therapy will be held for heart rate (HR) greater than 140 beats per minute, pending direction from physician. 3. Bronchodilators will be administered via Metered Dose Inhaler (MDI) with spacer when the following criteria are met:  a. Alert and cooperative     b. HR < 140 bpm  c. RR < 30 bpm                d. Can demonstrate a 2-3 second inspiratory hold  4. Bronchodilators will be administered via Hand Held Nebulizer HEB[facility]) to patients when ANY of the following criteria are met  a. Incognizant or uncooperative          b. Patients treated with HHN at Home        c. Unable to demonstrate proper use of MDI with spacer     d. RR > 30 bpm   5. Bronchodilators will be delivered via Metered Dose Inhaler (MDI), HHN, Aerogen to intubated patients on mechanical ventilation. 6. Inhalation medication orders will be delivered and/or substituted as outlined below. Aerosolized Medications Ordering and Administration Guidelines:    1. All Medications will be ordered by a physician, and their frequency and/or modality will be adjusted as defined by the patients Respiratory Severity Index (RSI) score. 2. If the patient does not have documented COPD, consider discontinuing anticholinergics when RSI is less than 9.  3. If the bronchospasm worsens (increased RSI), then the bronchodilator frequency can be increased to a maximum of every 4 hours. If greater than every 4 hours is required, the physician will be contacted. 4. If the bronchospasm improves, the frequency of the bronchodilator can be decreased, based on the patient's RSI, but not less than home treatment regimen frequency.   5. Bronchodilator(s) will be discontinued if patient has a RSI less than 9 and has received no scheduled or as needed treatment for 72  Hrs. Patients Ordered on a Mucolytic Agent:    1. Must always be administered with a bronchodilator. 2. Discontinue if patient experiences worsened bronchospasm, or secretions have lessened to the point that the patient is able to clear them with a cough. Anti-inflammatory and Combination Medications:    1. If the patient lacks prior history of lung disease, is not using inhaled anti-inflammatory medication at home, and lacks wheezing by examination or by history for at least 24 hours, contact physician for possible discontinuation.

## 2019-05-31 NOTE — PATIENT INSTRUCTIONS
Go to [facility] admitting now to be admitted,  Estrada Jones has everything set upo.   Return after discharge

## 2019-05-31 NOTE — CONSULTS
Clinical Pharmacy Consult Note    Admit date: 5/31/2019    Subjective/Objective:  51 yo female with PMH significant for DMII, diabetic foot ulcer, and h/o MRSA infection admitted directly from outpatient podiatry clinic d/t underlying bone exposed on her right root. Pt has been taking Augmentin since this past Saturday for her diabetic foot ulcer and was planned for surgery this week, but has been unable to see her PCP for clearance. She is now being admitted for IV antibiotics, an MRI to rule out possible underlying abscess or osteomyelitis, and surgical intervention. Pharmacy is consulted to dose Vancomycin per Dr. Edwin Delvalle    Pertinent Medications:  Vancomycin -- day # 1   1.5 g IV x 1 dose (5/31)   1.25 g IV q18h (scheduled to start 6/1)  Piperacillin/tazobactam 3.375 g IV q8h -- day #1    PERTINENT LABS  Recent Labs     05/31/19  1609   *   K 5.4*   CL 97*   CO2 26   BUN 20   CREATININE 1.2*     Est CrCL 53 mL/min (based on SCr 1.2 mg/dL)  Height: 157 cm (3/25/19)  Weight: 85. 3 kg (3/25/19)    Micro:  Date Site Micro Susceptibility   5/31 Wound culture Collected                    Assessment/Plan:  1. Diabetic foot ulcer / possible osteomyelitis: Vancomycin + Zosyn day #1  Vancomycin  · Will start vancomycin 1.5 g (~17.5 mg/kg) IV x 1 dose, followed by 1.25 g (~15 mg/kg) IV q18h maintenance dose. This is based on a half-life elimination of ~14 hours. · Will plan to obtain a trough prior to the 4th or 5th dose of this regimen. Goal trough is 15-20 mcg/mL for diabetic foot ulcer and possible osteomyelitis. · Renal function will be monitored closely and dosing will be adjusted as appropriate. Please call with any questions. Thank you for consulting pharmacy!   Garth Wheeler, PharmD, [facility]  [phone]  5/31/2019 5:12 PM

## 2019-05-31 NOTE — DISCHARGE SUMMARY
Patient ID: Cory Mosley      Patient's PCP: Jorge Sorenson MD    Admit Date: 5/31/2019     Discharge Date: 6/7/19    Admitting Physician: Lo Mcallister DPM    Discharge Physician: Sylwia Leslie DPM    Discharge Diagnoses:   Patient Active Problem List   Diagnosis    Feet clawing    Diabetic neuropathy ([address]    Tinea pedis    Dyslipidemia    HTN (hypertension)    Amenorrhea    Dyspareunia    Neuropathy    Bacterial vaginosis    Pain in back    Anomalies of nails    Tobacco abuse    Vaginal bleeding, abnormal    Carpal tunnel syndrome    Scalp lesion    ETOH abuse    DM type 2, uncontrolled, with lower extremity ulcer ([address]    Pseudocyst, pancreas    Pancreatitis    Asthma    Pain, eye, right    Pneumonia    Nicotine addiction    Acute pancreatitis    Hypoxia    Onychomycosis    Depressive disorder, not elsewhere classified    Anxiety state    Tinea pedis    Open wound of left foot    Burn    Obesity (BMI 30-39. 9)    S/P foot surgery, right    Pain medication agreement signed    Post-op pain    Open wound of left foot with complication    Cellulitis    Diabetic foot infection ([address]    Bilateral lower extremity edema    Chronic multifocal osteomyelitis of left foot (HCC)    Open wound of right foot    Diabetic ulcer of right foot associated with type 2 diabetes mellitus, with bone involvement without evidence of necrosis (HCC)    Cellulitis of right foot    Acute systolic congestive heart failure (HCC)    Acute osteomyelitis of metatarsal bone of right foot (HCC)    Cellulitis of right lower extremity    Cast discomfort    Bronchitis    Cellulitis and abscess of foot    Diabetic polyneuropathy associated with type 2 diabetes mellitus ([address]    Group B streptococcal infection    Diabetic ulcer of left foot associated with type 2 diabetes mellitus, with fat layer exposed ([address]    Tendonitis, Achilles, right    Diabetic ulcer of right foot due to type 2 diabetes mellitus ([facility]    Opiate dependence ([facility]    Class 1 obesity in adult    CKD (chronic kidney disease), stage III (HCC)    Diabetic foot infection (HCC)    Chronic osteomyelitis of right foot with draining sinus (HCC)    FAHEEM (acute kidney injury) ([facility]    Acute blood loss anemia       Hospital Course:  Briefly, this is a 52year old female who was a direct admission from podiatry outpatient clinic for concern for underlying bone infection to Right foot. Patient was unable to see her PCP and get MRI of Right foot outpaitent earlier this week, and due to concern for worsen of the Right foot, patient was admitted to the hospital for IV antibiotics, advanced imaging and planned surgical intervention. Hospitalist was consulted for medical management and surgical clearance. Social work was consulted for discharge planning. Patient was started on broad spectrum IV antibiotics (Vanc/Zosyn). X-ray and MRI of Right foot were ordered upon admission. Percocet pain panel was started for pain management. On 6/3/19, the patient was seen by infectious disease who determined the patient would require a PICC line at discharge. Later that same day, the patient has a PICC line placed. On 6/6/19, the patient underwent a transmetatarsal procedure of the right foot. The surgical wound was primarily closed and a posterior splint was applied. Patient was instructed to remain non weight bearing on the right foot. On 6/7/19, the patient was discharged to a skilled nursing facility with physical condition and vital signs stable. The patient was instructed to follow up in [facility] outpatient podiatry clinic in 1 week. During the patients stay, the hospitalist, , PT/OT, and infectious disease were all involved in the patients care.      Consults: IP CONSULT TO HOSPITALIST  IP CONSULT TO SOCIAL WORK  PHARMACY TO DOSE VANCOMYCIN  IP CONSULT TO INFECTIOUS DISEASES    Significant Diagnostic Studies:   US RENAL COMPLETE Medications:   David Guy   Home Medication Instructions [id number]    Printed on:06/07/19 1600   Medication Information                      Accu-Chek Softclix Lancets MISC  1 strip by Does not apply route 2 times daily Check before breakfast and before going to bed             albuterol sulfate  (90 Base) MCG/ACT inhaler  Inhale 2 puffs into the lungs every 6 hours as needed for Wheezing             amitriptyline (ELAVIL) 100 MG tablet  Take 1 tablet by mouth nightly             ammonium lactate (LAC-HYDRIN) 12 % lotion  Apply topically daily. ammonium lactate (LAC-HYDRIN) 12 % lotion  Apply topically daily. apixaban (ELIQUIS STARTER PACK) 5 MG TABS tablet  Take 10 mg (2 tablets) orally twice daily for 7 days, then take 5 mg (1 tablet) orally twice daily thereafter. aspirin 325 MG tablet  Take 1 tablet by mouth daily             atorvastatin (LIPITOR) 20 MG tablet  Take 1 tablet by mouth daily             blood glucose test strips (TRUE METRIX BLOOD GLUCOSE TEST) strip  1 each by In Vitro route 2 times daily As needed. furosemide (LASIX) 40 MG tablet  Take 1 tablet by mouth 2 times daily             gabapentin (NEURONTIN) 800 MG tablet  Take 1 tablet by mouth 4 times daily for 90 days. Gauze Pads & Dressings MISC  Please dispense 4x8 guaze, kerlix, and ace             insulin glargine (BASAGLAR KWIKPEN) 100 UNIT/ML injection pen  Inject 40 Units into the skin 2 times daily             insulin lispro (HUMALOG) 100 UNIT/ML pen  Inject 8 Units into the skin 3 times daily (with meals)             Insulin Pen Needle 31G X 5 MM MISC  1 each by Does not apply route daily             Lancets MISC  1 each by Does not apply route 2 times daily PHARMACY MAY SUBSTITUTE TO TRUE METRIX LANCETS             methadone (DOLOPHINE) 10 MG tablet  Take 140 mg by mouth daily. .             metoprolol succinate (TOPROL XL) 50 MG extended release tablet  Take 1

## 2019-05-31 NOTE — PROGRESS NOTES
Patient admitted to [id number], patient alert and oriented x4, VSS, neuro checks WNL, patient has baseline numbness/tingling/neuropathy to BLE. Patient has dressings and walking boots to BLE. Patient oriented to room and call light, bed alarm on for safety. Will continue to monitor.

## 2019-05-31 NOTE — H&P
Department of Podiatric Surgery  History and Physical        CHIEF COMPLAINT:  \"Right foot pain\"    Reason for Admission:  IV antibiotics and Surgical intervention    History Obtained From:  patient    HISTORY OF PRESENT ILLNESS:      The patient is a 52 y.o. female with significant past medical history linked below is a direct admission from podiatry outpatient clinic due to underlying bone exposed to the Right foot, and patient not being able to have seen her PCP to be cleared for surgery this week and obtaining outpatient MRI Right foot. Patient stated that she had been feeling better this week since taking her prescribed Augmentin medication since this past Saturday. She states that she had been performing daily dressing changes on the Right foot as previously instructed and left the total contact cast on the Left lower extremity clean, dry, and intact. Patient denies any foot pain at this time 0/10 on VAS. However, due to concerns for underlying bone being exposed to the Right foot with concern for underlying osteomyelitis, patient was admitted from outpatient clinic for IV antibiotics, advanced imaging, and plan for surgical intervention pending MRI findings.       Past Medical History:        Diagnosis Date    Amenorrhea     Anomalies of nails     Asthma 5/14/04    Athlete's foot 8/2010    Bacterial vaginosis 04/2008    Carpal tunnel syndrome 5/2007    COPD [facility]ronic obstructive pulmonary disease) [facility]     Diabetes mellitus type II 08/2007    Diabetic neuropathy ([facility] 8/10    DVT (deep venous thrombosis) ([facility] 3/2004    Dyslipidemia 5/2009    Dyspareunia 05/2009    ETOH abuse 3/04/2007    Feet clawing     HTN (hypertension)     Hx of blood clots     Hyperlipidemia     MRSA (methicillin resistant staph aureus) culture positive 11/06/2017; 11/17/2017    foot; leg     Neuropathy 05/2009    polyneuropathy    Pain, back 04/2008    Pain, eye, right 5/14/04    Pancreatitis 5/14/04    Pseudocyst, pancreas 04    Scalp lesion 2007    Tinea pedis     Tobacco abuse 2008    Vaginal bleeding, abnormal 2007     Past Surgical History:        Procedure Laterality Date     SECTION  unknown    FOOT DEBRIDEMENT Right 2019    INCISION AND DRAINAGE WITH APPLICATION OF STRAVIX GRAFT RIGHT FOOT performed by Justo Downs DPM at [address] Left 2016    I & D left foot    OTHER SURGICAL HISTORY Right 10/20/2017    RIGHT GASTROC LENGTHENING ENDOSCOPIC, INJECTION OF AMNI GRAFT    OTHER SURGICAL HISTORY Right 2018    Diabetic foot ulcer I&D w/ integra graft application    VT DEBRIDEMENT, SKIN, SUB-Q TISSUE,MUSCLE,BONE,=<20 SQ CM Right 2018    RIGHT FOOT DEBRIDEMENT INCISION AND DRAINAGE, PARTIAL 5TH RAY AMPUTATION performed by Justo Downs DPM at [facility] / [address]  [address]    cryotherapy done on lesion    TOE AMPUTATION Left 2017    AMPUTATION LEFT GREAT TOE                  Medications Prior to Admission:   Medications Prior to Admission: amoxicillin-clavulanate (AUGMENTIN) 875-125 MG per tablet, Take 1 tablet by mouth 2 times daily for 10 days  gabapentin (NEURONTIN) 800 MG tablet, Take 1 tablet by mouth 4 times daily for 90 days. amitriptyline (ELAVIL) 100 MG tablet, Take 1 tablet by mouth nightly  apixaban (ELIQUIS STARTER PACK) 5 MG TABS tablet, Take 10 mg (2 tablets) orally twice daily for 7 days, then take 5 mg (1 tablet) orally twice daily thereafter.   insulin lispro (HUMALOG) 100 UNIT/ML pen, Inject 8 Units into the skin 3 times daily (with meals)  insulin glargine (BASAGLAR KWIKPEN) 100 UNIT/ML injection pen, Inject 40 Units into the skin 2 times daily  metoprolol succinate (TOPROL XL) 50 MG extended release tablet, Take 1 tablet by mouth nightly  aspirin 325 MG tablet, Take 1 tablet by mouth daily  albuterol sulfate  (90 Base) MCG/ACT inhaler, Inhale 2 puffs into the lungs every 6 hours as needed for Wheezing  omeprazole (PRILOSEC) 20 MG delayed release capsule, Take 1 capsule by mouth Daily  atorvastatin (LIPITOR) 20 MG tablet, Take 1 tablet by mouth daily  furosemide (LASIX) 40 MG tablet, Take 1 tablet by mouth 2 times daily  methadone (DOLOPHINE) 10 MG tablet, Take 140 mg by mouth daily. Rosenda [place] ammonium lactate (LAC-HYDRIN) 12 % lotion, Apply topically daily. Lancets MISC, 1 each by Does not apply route 2 times daily PHARMACY MAY SUBSTITUTE TO TRUE METRIX LANCETS  Insulin Pen Needle 31G X 5 MM MISC, 1 each by Does not apply route daily  blood glucose test strips (TRUE METRIX BLOOD GLUCOSE TEST) strip, 1 each by In Vitro route 2 times daily As needed. Gauze Pads & Dressings MISC, Please dispense 4x8 guaze, kerlix, and ace  ammonium lactate (LAC-HYDRIN) 12 % lotion, Apply topically daily. (Patient taking differently: as needed Apply topically daily.)  Accu-Chek Softclix Lancets MISC, 1 strip by Does not apply route 2 times daily Check before breakfast and before going to bed  Allergies:  Sulfa antibiotics  Social History:   TOBACCO:   reports that she has been smoking cigarettes. She has a 30.00 pack-year smoking history. She has never used smokeless tobacco.  ETOH:   reports that she does not drink alcohol. DRUGS:   reports that she does not use drugs. Family History:   No family history on file.   REVIEW OF SYSTEMS:  CONSTITUTIONAL:  negative for  fevers and chills  RESPIRATORY:  negative for  dyspnea  CARDIOVASCULAR:  negative for  chest pain  GASTROINTESTINAL:  negative for nausea, vomiting and abdominal pain  INTEGUMENT/BREAST:  positive for skin color change and open wounds, bilateral feet  MUSCULOSKELETAL:  positive for  pain, decreased range of motion and bone pain  negative for  myalgias, arthralgias, joint swelling, stiff joints and muscle weakness    PHYSICAL EXAM:  VITALS:    Vitals:    06/07/19 0620 06/07/19 0640 06/07/19 1341 06/07/19 1501   BP: 123/69 132/76 130/70 114/72   Pulse: 97 92 95 84   Resp: 18 18 17 18   Temp: 98.8 °F (37.1 °C) 98.3 °F (36.8 °C) 98.7 °F (37.1 °C) 100 °F (37.8 °C)   TempSrc: Oral Oral Oral Oral   SpO2: 94% 95% 91% 94%   Weight:       Height:           CONSTITUTIONAL: Awake, alert, cooperative, no apparent distress, and appears stated age  EYES: Pupils equal, round and reactive to light, extra ocular muscles intact, sclera clear, conjunctiva normal  ENT: Normocepalic, without obvious abnormality  NECK: Supple, symmetrical, trachea midline, no adenopathy, thyroid symmetric, not enlarged and no tenderness, skin normal  LUNGS: No increased work of breathing, good air exchange, clear to auscultation bilaterally, no crackles or wheezing  CARDIOVASCULAR: Normal apical impulse, regular rate and rhythm  ABDOMEN: Normal bowel sounds, soft, non-distended, non-tender, no masses palpated, no hepatosplenomegally    Lower extremity exam    VASCULAR: DP and PT pulses are palpable 1/4 b/l. CFT is brisk to the remaining digits of the foot, b/l. Skin temperature is warm to warm from proximal to distal with no focal calor noted, Right lower extremity. No edema noted, Right lower extremity. No pain with calf compression, Right lower extremity.      NEUROLOGIC: Gross and epicritic sensation is diminished b/l. Protective sensation is diminished at all pedal sites b/l.     DERMATOLOGIC: Wound noted to plantar aspect Right foot sub 4th metatarsal head. Wound measures  3.2 x 2.4 x 0.6 cm. Periwound maceration noted sub 4th metatarsal region, Right foot. Purulent and serous drainage noted. Wound does probe to bone. Wound does track to dorsum of Right foot and does undermine circumferentially approximately 1 cm in diameter. No malodor noted. Xerosis noted to the Right foot. Wound noted to Left foot sub 1st metatarsal head, wound measures 1.8 x 1.8 x 0.2 cm. Wound base is fibrotic with periwound hyperkeratosis noted. No drainage noted. No malodor noted.  Wound does not probe, track or undermine. Malodor noted. Wound noted to the lateral aspect of the Left 4th digit at the level of the PIPJ. Wound measures <0.5 cm in diameter. Wound bed is fibrotic. Wound does not probe, track or undermine. MUSCULOSKELETAL: Muscle strength is 5/5 for all pedal groups tested. Pain with palpation to the plantar aspect of the Right foot at the level of the wound. 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 IMAGING:  Xray of right foot shows likely osteo of the 4th met head, and a new fx at the met neck when compared to image from 3/19. LABS:  Pending    ASSESSMENT AND PLAN:  1. Neuropathic ulceration sub 4th metatarsal head, Right foot 2/2 pressure-Carpenter grade III  2. Neuropathic ulceration 2/2 DM, sub 1st metatarsal head, Left foot-Carpenter grade I  3. Neuropathic ulcerated 2/2 pressure lateral aspect of 4th digit at the level of the PIPJ, Left foot-Carpenter grade I  4. DM Type II uncontrolled with peripheral neuropathy  5. History of Non-compliance  6. Personal History of Nicotine and opioid Dependence    -Patient tachycardic, otherwise VSS; Patient afebrile; No leukocytosis noted (WBC 9.4),  , CRP pending  -Xray Right foot shows changes consistent with osteo of 4th met head, and new fx at the neck. - MRI ordered will f/u. -Wound culture from clinic obtained, follow up C&S  -Patient started on broad spectrum Abx (Vanc/Zosyn), will deescalate pending cultures  -Percocet pain panel started for pain management  -Hospitalist consulted for medical management and surgical clearance  -Social work consulted for discharge planning  -Patient aware of planned surgery with risks vs. benefits of partial 4th ray amputation of the Right foot vs. Transmetatarsal amputation to create a more functionale braceable foot and prevent further wound breakdown.  Patient is in understanding of this and will be guided by MRI findings Right foot    DISPO: At this time will monitor patients response to IV Abx and review imaging to guide further treatment for planned surgical intervention during hospital course    Discussed assessment and treatment plan with RAMONE Cruz DPM   Podiatric Resident, PGY-2  Pager: [phone]  5/31/2019, 6:35 PM     Addendum: to add vitals per coding.    Lolita Andrade DPM   Podiatric Resident, PGY-2  Pager: [phone]  6/13/2019, 12:03 PM

## 2019-06-01 ENCOUNTER — APPOINTMENT (OUTPATIENT)
Dept: MRI IMAGING | Age: 54
DRG: 617 | End: 2019-06-01
Attending: PODIATRIST
Payer: COMMERCIAL

## 2019-06-01 LAB
ESTIMATED AVERAGE GLUCOSE: 314.9 MG/DL
GLUCOSE BLD-MCNC: 199 MG/DL (ref 70–99)
GLUCOSE BLD-MCNC: 250 MG/DL (ref 70–99)
GLUCOSE BLD-MCNC: 309 MG/DL (ref 70–99)
GLUCOSE BLD-MCNC: 332 MG/DL (ref 70–99)
HBA1C MFR BLD: 12.6 %
PERFORMED ON: ABNORMAL

## 2019-06-01 PROCEDURE — 6360000002 HC RX W HCPCS: Performed by: STUDENT IN AN ORGANIZED HEALTH CARE EDUCATION/TRAINING PROGRAM

## 2019-06-01 PROCEDURE — 2580000003 HC RX 258: Performed by: STUDENT IN AN ORGANIZED HEALTH CARE EDUCATION/TRAINING PROGRAM

## 2019-06-01 PROCEDURE — 73718 MRI LOWER EXTREMITY W/O DYE: CPT

## 2019-06-01 PROCEDURE — 6370000000 HC RX 637 (ALT 250 FOR IP): Performed by: STUDENT IN AN ORGANIZED HEALTH CARE EDUCATION/TRAINING PROGRAM

## 2019-06-01 PROCEDURE — 1200000000 HC SEMI PRIVATE

## 2019-06-01 PROCEDURE — 6360000002 HC RX W HCPCS: Performed by: INTERNAL MEDICINE

## 2019-06-01 PROCEDURE — 2580000003 HC RX 258

## 2019-06-01 RX ORDER — SODIUM CHLORIDE 9 MG/ML
INJECTION, SOLUTION INTRAVENOUS
Status: COMPLETED
Start: 2019-06-01 | End: 2019-06-01

## 2019-06-01 RX ADMIN — ENOXAPARIN SODIUM 80 MG: 80 INJECTION SUBCUTANEOUS at 22:06

## 2019-06-01 RX ADMIN — GABAPENTIN 800 MG: 400 CAPSULE ORAL at 09:19

## 2019-06-01 RX ADMIN — INSULIN GLARGINE 40 UNITS: 100 INJECTION, SOLUTION SUBCUTANEOUS at 22:15

## 2019-06-01 RX ADMIN — SODIUM CHLORIDE: 900 INJECTION, SOLUTION INTRAVENOUS at 22:26

## 2019-06-01 RX ADMIN — INSULIN LISPRO 4 UNITS: 100 INJECTION, SOLUTION INTRAVENOUS; SUBCUTANEOUS at 17:43

## 2019-06-01 RX ADMIN — METOPROLOL SUCCINATE 50 MG: 50 TABLET, EXTENDED RELEASE ORAL at 22:05

## 2019-06-01 RX ADMIN — INSULIN LISPRO 8 UNITS: 100 INJECTION, SOLUTION INTRAVENOUS; SUBCUTANEOUS at 09:16

## 2019-06-01 RX ADMIN — FUROSEMIDE 40 MG: 40 TABLET ORAL at 17:40

## 2019-06-01 RX ADMIN — PIPERACILLIN SODIUM,TAZOBACTAM SODIUM 3.38 G: 3; .375 INJECTION, POWDER, FOR SOLUTION INTRAVENOUS at 05:00

## 2019-06-01 RX ADMIN — COLLAGENASE SANTYL: 250 OINTMENT TOPICAL at 09:21

## 2019-06-01 RX ADMIN — INSULIN LISPRO 3 UNITS: 100 INJECTION, SOLUTION INTRAVENOUS; SUBCUTANEOUS at 09:18

## 2019-06-01 RX ADMIN — ATORVASTATIN CALCIUM 20 MG: 20 TABLET, FILM COATED ORAL at 22:05

## 2019-06-01 RX ADMIN — OXYCODONE HYDROCHLORIDE AND ACETAMINOPHEN 2 TABLET: 5; 325 TABLET ORAL at 22:05

## 2019-06-01 RX ADMIN — Medication 140 MG: at 09:31

## 2019-06-01 RX ADMIN — OXYCODONE HYDROCHLORIDE AND ACETAMINOPHEN 2 TABLET: 5; 325 TABLET ORAL at 15:18

## 2019-06-01 RX ADMIN — PIPERACILLIN SODIUM,TAZOBACTAM SODIUM 3.38 G: 3; .375 INJECTION, POWDER, FOR SOLUTION INTRAVENOUS at 22:10

## 2019-06-01 RX ADMIN — FUROSEMIDE 40 MG: 40 TABLET ORAL at 09:19

## 2019-06-01 RX ADMIN — INSULIN GLARGINE 40 UNITS: 100 INJECTION, SOLUTION SUBCUTANEOUS at 09:16

## 2019-06-01 RX ADMIN — APIXABAN 5 MG: 5 TABLET, FILM COATED ORAL at 09:20

## 2019-06-01 RX ADMIN — PANTOPRAZOLE SODIUM 40 MG: 40 TABLET, DELAYED RELEASE ORAL at 09:20

## 2019-06-01 RX ADMIN — ASPIRIN 325 MG: 325 TABLET, DELAYED RELEASE ORAL at 09:20

## 2019-06-01 RX ADMIN — PIPERACILLIN SODIUM,TAZOBACTAM SODIUM 3.38 G: 3; .375 INJECTION, POWDER, FOR SOLUTION INTRAVENOUS at 11:24

## 2019-06-01 RX ADMIN — OXYCODONE HYDROCHLORIDE AND ACETAMINOPHEN 2 TABLET: 5; 325 TABLET ORAL at 05:52

## 2019-06-01 RX ADMIN — VANCOMYCIN HYDROCHLORIDE 1250 MG: 10 INJECTION, POWDER, LYOPHILIZED, FOR SOLUTION INTRAVENOUS at 15:19

## 2019-06-01 RX ADMIN — AMITRIPTYLINE HYDROCHLORIDE 100 MG: 50 TABLET, FILM COATED ORAL at 22:05

## 2019-06-01 RX ADMIN — INSULIN LISPRO 8 UNITS: 100 INJECTION, SOLUTION INTRAVENOUS; SUBCUTANEOUS at 17:43

## 2019-06-01 RX ADMIN — GABAPENTIN 800 MG: 400 CAPSULE ORAL at 22:05

## 2019-06-01 RX ADMIN — GABAPENTIN 800 MG: 400 CAPSULE ORAL at 17:39

## 2019-06-01 RX ADMIN — INSULIN LISPRO 1 UNITS: 100 INJECTION, SOLUTION INTRAVENOUS; SUBCUTANEOUS at 22:15

## 2019-06-01 ASSESSMENT — PAIN DESCRIPTION - DESCRIPTORS
DESCRIPTORS: ACHING

## 2019-06-01 ASSESSMENT — PAIN SCALES - GENERAL
PAINLEVEL_OUTOF10: 8
PAINLEVEL_OUTOF10: 8
PAINLEVEL_OUTOF10: 3
PAINLEVEL_OUTOF10: 5
PAINLEVEL_OUTOF10: 3
PAINLEVEL_OUTOF10: 7
PAINLEVEL_OUTOF10: 7
PAINLEVEL_OUTOF10: 8

## 2019-06-01 ASSESSMENT — PAIN DESCRIPTION - LOCATION
LOCATION: FOOT

## 2019-06-01 ASSESSMENT — PAIN DESCRIPTION - ORIENTATION
ORIENTATION: RIGHT

## 2019-06-01 ASSESSMENT — PAIN - FUNCTIONAL ASSESSMENT
PAIN_FUNCTIONAL_ASSESSMENT: ACTIVITIES ARE NOT PREVENTED

## 2019-06-01 ASSESSMENT — PAIN DESCRIPTION - ONSET
ONSET: ON-GOING

## 2019-06-01 ASSESSMENT — PAIN DESCRIPTION - PROGRESSION
CLINICAL_PROGRESSION: GRADUALLY WORSENING

## 2019-06-01 ASSESSMENT — PAIN DESCRIPTION - PAIN TYPE
TYPE: ACUTE PAIN

## 2019-06-01 ASSESSMENT — PAIN DESCRIPTION - FREQUENCY
FREQUENCY: CONTINUOUS

## 2019-06-01 NOTE — PROGRESS NOTES
without Rales/Wheezes/Rhonchi. Cardiovascular: Regular rate and rhythm with normal S1/S2 without murmurs, rubs or gallops. Abdomen: Soft, non-tender, non-distended with normal bowel sounds. Musculoskeletal:   Right lower extremity with dressing present  Skin: Skin color, texture, turgor normal.  No rashes or lesions. Neurologic:  Neurovascularly intact without any focal sensory/motor deficits. Cranial nerves: II-XII intact, grossly non-focal.  Psychiatric: Alert and oriented, thought content appropriate, normal insight  Capillary Refill: Brisk,< 3 seconds   Peripheral Pulses: +2 palpable, equal bilaterally       Labs:   Recent Labs     05/31/19  1608   WBC 9.4   HGB 10.7*   HCT 32.8*   *     Recent Labs     05/31/19  1609   *   K 5.4*   CL 97*   CO2 26   BUN 20   CREATININE 1.2*   CALCIUM 8.9     No results for input(s): AST, ALT, BILIDIR, BILITOT, ALKPHOS in the last 72 hours. No results for input(s): INR in the last 72 hours. No results for input(s): Eward Pong in the last 72 hours. Urinalysis:      Lab Results   Component Value Date    NITRU Negative 05/24/2019    WBCUA 10-20 05/24/2019    BACTERIA 2+ 05/24/2019    RBCUA 10-20 05/24/2019    BLOODU MODERATE 05/24/2019    SPECGRAV 1.020 05/24/2019    GLUCOSEU >=1000 05/24/2019       Radiology:  XR FOOT RIGHT (MIN 3 VIEWS)   Final Result      Findings worrisome for osteomyelitis of the head of the fourth metacarpal.      Changes of the posterior calcaneus which may be postsurgical, infectious or posttraumatic. Please correlate clinically      MRI FOOT RIGHT WO CONTRAST    (Results Pending)           Assessment/Plan:    Active Hospital Problems    Diagnosis Date Noted    Diabetic ulcer of right foot due to type 2 [facility] [J21.029, L97.519] 05/31/2019     1.  Diabetic ulcer of right foot due to type 2 diabetes mellitus concerning for osteomyelitis of the right 4th metacarpal  Elevated ESR and CRP  Continue Vancomycin and Zosyn (monitor closely), high risk medications and pharmacy is on board for assistance with dosing  Daily renal panel, as vanc +zosyn combination with increased risk of arf  MRI with Destructive osteomyelitis involving the fourth digit proximal phalanx and fourth digit metatarsal, Soft tissue ulceration of the lateral foot with subcutaneous emphysema. Fifth digit ulceration at the distal shaft level of amputation concerning for early osteo  Patient would be moderate risk for intermediate risk surgery but may proceed with surgery. Ok to proceed with this limb threatening infection  2. Uncontrolled Type 2 Diabetes complicated by neuropathy; continue Lantus 40 units bid, 8 units tid prandial insulin, increased SSI. Check A1c  3. History of DVT: will stop eliquis and start therapeutic lovenox, this be stopped 12 hours prior to planned surgical intervention. Last dose of Eliquis given morning of 06/01/2019  4. Chronic diastolic heart failure not in exacerbation: continue home medications, Toprol XL, Lasix 40 mg bid  5. Chronic pain?  methadone use      DVT Prophylaxis: Lovenox therapeutic  Diet: DIET CARB CONTROL;  Code Status: Full Code    PT/OT Eval Status: Weight bearing precautions per podiatary    Dispo - per primary team    Jose Smith MD  Hospitalist

## 2019-06-01 NOTE — PROGRESS NOTES
Clinical Pharmacy Consult Note    Admit date: 5/31/2019    Subjective/Objective:  53 yo female with PMH significant for DMII, diabetic foot ulcer, and h/o MRSA infection admitted directly from outpatient podiatry clinic d/t underlying bone exposed on her right root. Pt has been taking Augmentin since this past Saturday for her diabetic foot ulcer and was planned for surgery this week, but has been unable to see her PCP for clearance. She is now being admitted for IV antibiotics, an MRI to rule out possible underlying abscess or osteomyelitis, and surgical intervention. Pharmacy is consulted to dose Vancomycin per Dr. Erin Phillips    Pertinent Medications:  Vancomycin -- day # 2    1.5 g IV x 1 dose (5/31)   1.25 g IV q18h (scheduled to start 6/1)  Piperacillin/tazobactam 3.375 g IV q8h -- day #2    PERTINENT LABS  Recent Labs     05/31/19  1609   *   K 5.4*   CL 97*   CO2 26   BUN 20   CREATININE 1.2*     Est CrCL 54 mL/min (based on SCr 1.2 mg/dL)  Height: 157 cm (3/25/19)  Weight: 86.2 kg (3/25/19)    No cultures collected. Assessment/Plan:  1. Diabetic foot ulcer / possible osteomyelitis: Vancomycin + Zosyn day #2  Vancomycin  · Continue vancomycin 1.25 g IV q18h. · No BMP drawn yet today, ordered daily. · Will plan to obtain a trough prior to the 4th or 5th dose of this regimen. Goal trough is 15-20 mcg/mL for diabetic foot ulcer and possible osteomyelitis. · Renal function will be monitored closely and dosing will be adjusted as appropriate. Please call with any questions.   Thank you for consulting pharmacy  Ronaldo Aguilar, PharmD   PGY1 Pharmacy Resident   Wireless: [phone]  6/1/2019 11:23 AM

## 2019-06-01 NOTE — PLAN OF CARE
Nutrition Problem: Increased nutrient needs  Intervention: Food and/or Nutrient Delivery: Continue current diet, Start ONS  Nutritional Goals: pt will consume 50% or more of all meals and supplements offered to promote wound healing w/ POCG to remain <180mg/dL

## 2019-06-01 NOTE — PROGRESS NOTES
Pharmacy Progress Note    Enoxaparin 90 mg subcutaneous daily/BID ordered, which provides 1 mg/kg BID based on patient weight of 86.2 kg. Per protocol, pharmacy has rounded ordered dose to nearest prefilled syringe size. Order has been changed to 80 mg subcutaneous daily/BID. Rounding up or down to the closest prefilled syringe size vs the actual weight-based dose will be a small dose difference that is clinically insignificant to impact patient care. Twice daily dosing:  Weight (kg) Actual Dose (mg) Recommended prefilled syringe size (mg)   30-35 30-35 30   36-49 36-49 40   50-69 50-69 60   70-89 70-89 80     100   110-134 110-134 120   135-159 135-159 150     ? 160 kg : [facility] to contact provider to discuss alternative therapy        Please call Pharmacy with any questions.   Flory Narayan, PharmD, [facility]  [phone]  6/1/2019 5:10 PM

## 2019-06-01 NOTE — PROGRESS NOTES
Attempted to see patient 3x today, will see early tomorrow morning for dressing change and to review MRI being done today and cx results. Then will plan for surgical intervention early next week.      Eliza Hazel DPM   Podiatric Resident, PGY-2  Pager: [phone]  6/1/2019, 3:12 PM

## 2019-06-01 NOTE — PLAN OF CARE
Problem: Falls - Risk of:  Goal: Will remain free from falls  Description  Will remain free from falls  6/1/2019 1421 by Leena Douglas RN  Outcome: Met This Shift  Note:   Patient will remain free from falls throughout shift. High fall risk. Up x1 with gait belt and cane. Fall precautions in place. Non-skid socks on. Bed locked in lowest position with alarm on and 2/4 side rails up. Bedside table and call light within reach. Patient calling out appropriately when needing assistance. Hourly rounding in anticipation of patient needs. Floor clean and free from clutter. Room door closed upon patient request. Will continue to monitor.

## 2019-06-01 NOTE — PROGRESS NOTES
VSS. Pain controlled with PO. Voids WNL. Skin intact other than DM foot ulcers. ABX per orders and podiatry to see pt. Glucose down from 396 mg/dl to 353 mg/dl overnight. Pt states she missed her morning loading dose of insulin at home today. Will continue to monitor. Bed alarm set. Call light in reach.

## 2019-06-01 NOTE — PROGRESS NOTES
Patient alert and oriented x4. Vital signs stable. Elevated blood sugars. Pain to right foot controlled with oral pain medication. Numbness and tingling to bilateral lower extremities is baseline for patient. Expiratory wheezes heard and active bowel sounds. Tolerating diet well. Denies n/v. Voiding within normal limits. Patient being seen by podiatry. Podiatry resident came by to see patient and change dressing but patient was in MRI. Resident mentioned coming back in the morning. Up x1 with gait belt and cane. Tolerating ambulation well. Fall precautions in place. Bed alarm on. Patient calling out appropriately when needing assistance. Will continue to monitor.

## 2019-06-01 NOTE — PLAN OF CARE
Problem: Falls - Risk of:  Goal: Will remain free from falls  Description  Will remain free from falls  6/1/2019 0340 by Ravi Garner RN  Outcome: Met This Shift    Pt free from injury this shift and free of falls. 2/4 rails up on bed and bed is in the lowest position. Wheels locked and bed alarm set. Socks on pt and ID bands on pt. Call light in reach of pt and pt educated to call out to get up sba walker. Partial wb. Will continue to monitor for safety. Problem: Pain:  Goal: Control of acute pain  Description  Control of acute pain  Outcome: Met This Shift   Pain 4/10 after medications per orders. Pt resting  resp >14. Pt calls out appropriately for pain meds and pain is rated using 1-10 scale. Will continue to assess and reassess after intervention per orders.

## 2019-06-01 NOTE — PROGRESS NOTES
Nutrition Assessment    Type and Reason for Visit: Initial, Positive Nutrition Screen    Nutrition Recommendations:   · Continue Carb Controlled diet, monitor and record all po intake  · Trial Joshua, BID to aid w/ wound healing   · Monitor POCG and insulin regimen, skin integrity, and please obtain actual weight     Nutrition Assessment: Positive nutritional screen for wounds. Pt not available at time of RD visit. She is nutritionally at risk d/t increased needs for DM wound and is at risk for further nutritional decline w/ elevated POCG ranging from 250-414mg/dL and A1c of 12.6. Will start Joshua to aid w/ wound healing and monitor ability to educate on DM diet. Malnutrition Assessment:  · Malnutrition Status: At risk for malnutrition  · Context: Acute illness or injury  · Findings of the 6 clinical characteristics of malnutrition (Minimum of 2 out of 6 clinical characteristics is required to make the diagnosis of moderate or severe Protein Calorie Malnutrition based on AND/ASPEN Guidelines):  1. Energy Intake-Unable to assess,      2. Weight Loss-No significant weight loss,    3. Fat Loss-Unable to assess(pt not in room at this time ),    4. Muscle Loss-Unable to assess,    5. Fluid Accumulation-No significant fluid accumulation,    6.  Strength-Not measured    Nutrition Risk Level:  Moderate    Nutrient Needs:  · Estimated Daily Total Kcal: 5467-6442(51-29)  · Estimated Daily Protein (g): 75-90(1.5-1.8)  · Estimated Daily Total Fluid (ml/day): 9523-9706    Nutrition Diagnosis:   · Problem: Increased nutrient needs  · Etiology: related to Increased demand for energy/nutrients     Signs and symptoms:  as evidenced by Presence of wounds    Objective Information:  · Nutrition-Focused Physical Findings: no edema; no BM recorded   · Wound Type: Diabetic Ulcer(w/ bone exposed )  · Current Nutrition Therapies:  · Oral Diet Orders: Carb Control 4 Carbs/Meal   · Oral Diet intake: %(x 1 meal so far )  · Oral Nutrition Supplement (ONS) Orders: None  · Anthropometric Measures:  · Ht: 5' 2\" (157.5 cm)   · Current Body Wt: 190 lb (86.2 kg)(stated )  · Usual Body Wt: 190 lb (86.2 kg)(to 200lb per epic wt hx )  · Ideal Body Wt: 110 lb (49.9 kg)   · BMI Classification: BMI 30.0 - 34.9 Obese Class I    Nutrition Interventions:   Continue current diet, Start ONS  Continued Inpatient Monitoring    Nutrition Evaluation:   · Evaluation: Goals set   · Goals: pt will consume 50% or more of all meals and supplements offered to promote wound healing w/ POCG to remain <180mg/dL     · Monitoring: Meal Intake, Supplement Intake, Diet Tolerance, Wound Healing, Weight, Pertinent Labs      Electronically signed by Keven Pink RD, LD on 6/1/19 at 2:06 PM    GARETH MARTELL, TERESO  Pager:  [phone]  Office:  [phone]

## 2019-06-02 LAB
ANION GAP SERPL CALCULATED.3IONS-SCNC: 11 MMOL/L (ref 3–16)
BASOPHILS ABSOLUTE: 0.1 K/UL (ref 0–0.2)
BASOPHILS RELATIVE PERCENT: 0.7 %
BUN BLDV-MCNC: 32 MG/DL (ref 7–20)
CALCIUM SERPL-MCNC: 9.5 MG/DL (ref 8.3–10.6)
CHLORIDE BLD-SCNC: 99 MMOL/L (ref 99–110)
CO2: 28 MMOL/L (ref 21–32)
CREAT SERPL-MCNC: 1.4 MG/DL (ref 0.6–1.1)
EOSINOPHILS ABSOLUTE: 0.3 K/UL (ref 0–0.6)
EOSINOPHILS RELATIVE PERCENT: 3.4 %
GFR AFRICAN AMERICAN: 47
GFR NON-AFRICAN AMERICAN: 39
GLUCOSE BLD-MCNC: 124 MG/DL (ref 70–99)
GLUCOSE BLD-MCNC: 139 MG/DL (ref 70–99)
GLUCOSE BLD-MCNC: 158 MG/DL (ref 70–99)
GLUCOSE BLD-MCNC: 283 MG/DL (ref 70–99)
GRAM STAIN RESULT: ABNORMAL
HCT VFR BLD CALC: 33.9 % (ref 36–48)
HEMOGLOBIN: 11.1 G/DL (ref 12–16)
INR BLD: 1.04 (ref 0.86–1.14)
LYMPHOCYTES ABSOLUTE: 3.4 K/UL (ref 1–5.1)
LYMPHOCYTES RELATIVE PERCENT: 36.2 %
MCH RBC QN AUTO: 28.4 PG (ref 26–34)
MCHC RBC AUTO-ENTMCNC: 32.8 G/DL (ref 31–36)
MCV RBC AUTO: 86.6 FL (ref 80–100)
MONOCYTES ABSOLUTE: 0.5 K/UL (ref 0–1.3)
MONOCYTES RELATIVE PERCENT: 5.5 %
NEUTROPHILS ABSOLUTE: 5.1 K/UL (ref 1.7–7.7)
NEUTROPHILS RELATIVE PERCENT: 54.2 %
ORGANISM: ABNORMAL
PDW BLD-RTO: 15.5 % (ref 12.4–15.4)
PERFORMED ON: ABNORMAL
PLATELET # BLD: 500 K/UL (ref 135–450)
PMV BLD AUTO: 8.3 FL (ref 5–10.5)
POTASSIUM SERPL-SCNC: 5.1 MMOL/L (ref 3.5–5.1)
PROTHROMBIN TIME: 11.8 SEC (ref 9.8–13)
RBC # BLD: 3.91 M/UL (ref 4–5.2)
SEDIMENTATION RATE, ERYTHROCYTE: 106 MM/HR (ref 0–30)
SODIUM BLD-SCNC: 138 MMOL/L (ref 136–145)
WBC # BLD: 9.5 K/UL (ref 4–11)
WOUND/ABSCESS: ABNORMAL
WOUND/ABSCESS: ABNORMAL

## 2019-06-02 PROCEDURE — 36415 COLL VENOUS BLD VENIPUNCTURE: CPT

## 2019-06-02 PROCEDURE — 6370000000 HC RX 637 (ALT 250 FOR IP): Performed by: STUDENT IN AN ORGANIZED HEALTH CARE EDUCATION/TRAINING PROGRAM

## 2019-06-02 PROCEDURE — 85652 RBC SED RATE AUTOMATED: CPT

## 2019-06-02 PROCEDURE — 1200000000 HC SEMI PRIVATE

## 2019-06-02 PROCEDURE — 85025 COMPLETE CBC W/AUTO DIFF WBC: CPT

## 2019-06-02 PROCEDURE — 6360000002 HC RX W HCPCS: Performed by: STUDENT IN AN ORGANIZED HEALTH CARE EDUCATION/TRAINING PROGRAM

## 2019-06-02 PROCEDURE — 83036 HEMOGLOBIN GLYCOSYLATED A1C: CPT

## 2019-06-02 PROCEDURE — 6360000002 HC RX W HCPCS: Performed by: INTERNAL MEDICINE

## 2019-06-02 PROCEDURE — 85610 PROTHROMBIN TIME: CPT

## 2019-06-02 PROCEDURE — 80048 BASIC METABOLIC PNL TOTAL CA: CPT

## 2019-06-02 PROCEDURE — 2580000003 HC RX 258: Performed by: STUDENT IN AN ORGANIZED HEALTH CARE EDUCATION/TRAINING PROGRAM

## 2019-06-02 RX ORDER — LIDOCAINE HYDROCHLORIDE 10 MG/ML
5 INJECTION, SOLUTION EPIDURAL; INFILTRATION; INTRACAUDAL; PERINEURAL ONCE
Status: DISCONTINUED | OUTPATIENT
Start: 2019-06-02 | End: 2019-06-07 | Stop reason: HOSPADM

## 2019-06-02 RX ORDER — SODIUM CHLORIDE 0.9 % (FLUSH) 0.9 %
10 SYRINGE (ML) INJECTION EVERY 12 HOURS SCHEDULED
Status: DISCONTINUED | OUTPATIENT
Start: 2019-06-02 | End: 2019-06-07 | Stop reason: HOSPADM

## 2019-06-02 RX ORDER — SODIUM CHLORIDE 0.9 % (FLUSH) 0.9 %
10 SYRINGE (ML) INJECTION PRN
Status: DISCONTINUED | OUTPATIENT
Start: 2019-06-02 | End: 2019-06-07 | Stop reason: HOSPADM

## 2019-06-02 RX ADMIN — INSULIN LISPRO 8 UNITS: 100 INJECTION, SOLUTION INTRAVENOUS; SUBCUTANEOUS at 12:22

## 2019-06-02 RX ADMIN — OXYCODONE HYDROCHLORIDE AND ACETAMINOPHEN 2 TABLET: 5; 325 TABLET ORAL at 07:16

## 2019-06-02 RX ADMIN — ASPIRIN 325 MG: 325 TABLET, DELAYED RELEASE ORAL at 07:35

## 2019-06-02 RX ADMIN — VANCOMYCIN HYDROCHLORIDE 1250 MG: 10 INJECTION, POWDER, LYOPHILIZED, FOR SOLUTION INTRAVENOUS at 12:30

## 2019-06-02 RX ADMIN — OXYCODONE HYDROCHLORIDE AND ACETAMINOPHEN 2 TABLET: 5; 325 TABLET ORAL at 17:53

## 2019-06-02 RX ADMIN — INSULIN LISPRO 3 UNITS: 100 INJECTION, SOLUTION INTRAVENOUS; SUBCUTANEOUS at 12:22

## 2019-06-02 RX ADMIN — GABAPENTIN 800 MG: 400 CAPSULE ORAL at 21:49

## 2019-06-02 RX ADMIN — PANTOPRAZOLE SODIUM 40 MG: 40 TABLET, DELAYED RELEASE ORAL at 07:16

## 2019-06-02 RX ADMIN — INSULIN GLARGINE 40 UNITS: 100 INJECTION, SOLUTION SUBCUTANEOUS at 22:27

## 2019-06-02 RX ADMIN — GABAPENTIN 800 MG: 400 CAPSULE ORAL at 12:20

## 2019-06-02 RX ADMIN — PIPERACILLIN SODIUM,TAZOBACTAM SODIUM 3.38 G: 3; .375 INJECTION, POWDER, FOR SOLUTION INTRAVENOUS at 21:50

## 2019-06-02 RX ADMIN — AMITRIPTYLINE HYDROCHLORIDE 100 MG: 50 TABLET, FILM COATED ORAL at 21:49

## 2019-06-02 RX ADMIN — ATORVASTATIN CALCIUM 20 MG: 20 TABLET, FILM COATED ORAL at 21:49

## 2019-06-02 RX ADMIN — FUROSEMIDE 40 MG: 40 TABLET ORAL at 07:35

## 2019-06-02 RX ADMIN — PIPERACILLIN SODIUM,TAZOBACTAM SODIUM 3.38 G: 3; .375 INJECTION, POWDER, FOR SOLUTION INTRAVENOUS at 07:16

## 2019-06-02 RX ADMIN — ENOXAPARIN SODIUM 80 MG: 80 INJECTION SUBCUTANEOUS at 08:05

## 2019-06-02 RX ADMIN — GABAPENTIN 800 MG: 400 CAPSULE ORAL at 07:34

## 2019-06-02 RX ADMIN — COLLAGENASE SANTYL: 250 OINTMENT TOPICAL at 11:14

## 2019-06-02 RX ADMIN — Medication 140 MG: at 08:06

## 2019-06-02 RX ADMIN — INSULIN GLARGINE 40 UNITS: 100 INJECTION, SOLUTION SUBCUTANEOUS at 08:05

## 2019-06-02 RX ADMIN — GABAPENTIN 800 MG: 400 CAPSULE ORAL at 17:53

## 2019-06-02 RX ADMIN — METOPROLOL SUCCINATE 50 MG: 50 TABLET, EXTENDED RELEASE ORAL at 21:49

## 2019-06-02 ASSESSMENT — PAIN SCALES - GENERAL
PAINLEVEL_OUTOF10: 0
PAINLEVEL_OUTOF10: 7
PAINLEVEL_OUTOF10: 7
PAINLEVEL_OUTOF10: 9
PAINLEVEL_OUTOF10: 0
PAINLEVEL_OUTOF10: 5

## 2019-06-02 ASSESSMENT — PAIN DESCRIPTION - PROGRESSION
CLINICAL_PROGRESSION: GRADUALLY WORSENING

## 2019-06-02 NOTE — PROGRESS NOTES
Hospitalist Progress Note      PCP: Daniel Escalera MD    Date of Admission: 5/31/2019    Reason for consult: Medical management of uncontrolled diabetes, CHF, Pre Op Evaluation     Subjective:   Patient seen and examined  She says she is doing well  Anxious about surgical intervention in am      Medications:  Reviewed    Infusion Medications    dextrose       Scheduled Medications    piperacillin-tazobactam  3.375 g Intravenous Q8H    vancomycin  1,250 mg Intravenous Q18H    enoxaparin  80 mg Subcutaneous BID    amitriptyline  100 mg Oral Nightly    aspirin  325 mg Oral Daily    atorvastatin  20 mg Oral Nightly    furosemide  40 mg Oral BID    gabapentin  800 mg Oral 4x Daily    methadone  140 mg Oral Daily    metoprolol succinate  50 mg Oral Nightly    pantoprazole  40 mg Oral QAM AC    collagenase   Topical Daily    insulin glargine  40 Units Subcutaneous BID    insulin lispro  8 Units Subcutaneous TID WC    insulin lispro  0-6 Units Subcutaneous TID WC    insulin lispro  0-3 Units Subcutaneous Nightly    insulin lispro  8 Units Subcutaneous Once     PRN Meds: albuterol, naloxone, diphenhydrAMINE, acetaminophen, docusate sodium, ondansetron, promethazine, glucose, dextrose, glucagon (rDNA), dextrose, oxyCODONE-acetaminophen **OR** oxyCODONE-acetaminophen      Intake/Output Summary (Last 24 hours) at 6/2/2019 1253  Last data filed at 6/2/2019 0845  Gross per 24 hour   Intake 1200 ml   Output --   Net 1200 ml       Physical Exam Performed:    /72   Pulse 65   Temp 98.3 °F (36.8 °C) (Oral)   Resp 16   Ht 5' 2\" (1.575 m)   Wt 190 lb (86.2 kg)   SpO2 93%   BMI 34.75 kg/m²     General appearance: No apparent distress, appears stated age and cooperative. HEENT: Pupils equal, round, and reactive to light. Conjunctivae/corneas clear. Neck: Supple, with full range of motion. No jugular venous distention. Trachea midline. Respiratory:  Normal respiratory effort.  Clear to auscultation, bilaterally without Rales/Wheezes/Rhonchi. Cardiovascular: Regular rate and rhythm with normal S1/S2 without murmurs, rubs or gallops. Abdomen: Soft, non-tender, non-distended with normal bowel sounds. Musculoskeletal:   Right lower extremity with dressing present  Bilateral 2+ LE edema  Skin: Skin color, texture, turgor normal.  No rashes or lesions. Neurologic:  Neurovascularly intact without any focal sensory/motor deficits. Cranial nerves: II-XII intact, grossly non-focal.  Psychiatric: Alert and oriented, thought content appropriate, normal insight  Capillary Refill: Brisk,< 3 seconds   Peripheral Pulses: +2 palpable, equal bilaterally       Labs:   Recent Labs     05/31/19  1608 06/02/19 0528   WBC 9.4 9.5   HGB 10.7* 11.1*   HCT 32.8* 33.9*   * 500*     Recent Labs     05/31/19  1609 06/02/19 0528   * 138   K 5.4* 5.1   CL 97* 99   CO2 26 28   BUN 20 32*   CREATININE 1.2* 1.4*   CALCIUM 8.9 9.5     No results for input(s): AST, ALT, BILIDIR, BILITOT, ALKPHOS in the last 72 hours. Recent Labs     06/02/19 0528   INR 1.04     No results for input(s): Farrah Lynne in the last 72 hours. Urinalysis:      Lab Results   Component Value Date    NITRU Negative 05/24/2019    WBCUA 10-20 05/24/2019    BACTERIA 2+ 05/24/2019    RBCUA 10-20 05/24/2019    BLOODU MODERATE 05/24/2019    SPECGRAV 1.020 05/24/2019    GLUCOSEU >=1000 05/24/2019       Radiology:  MRI FOOT RIGHT WO CONTRAST   Final Result   Impression:   1. 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   2. Soft tissue ulceration of the lateral foot with subcutaneous emphysema. 3. 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  Given the proximity to this ulceration this is    suspicious for early involvement of infection  2. Uncontrolled Type 2 Diabetes complicated by neuropathy; BG trend reviewed, continue Lantus 40 units bid, 8 units tid prandial insulin, increased SSI to high dose. 3. FAHEEM: Cr 1.4, BUN 32 this am, she is on Lasix 40 mg bid which I will hold for now. On Vancomycin and Zosyn as well. May need alternate combination. Strict I/Os. Follow up renal indices in am  4. History of DVT: stopped eliquis and on therapeutic lovenox, this be stopped 12 hours prior to planned surgical intervention. Last dose of Eliquis given morning of 06/01/2019  5. Chronic diastolic heart failure not in exacerbation: continue home medications, Toprol XL, Lasix 40 mg bid  6. Chronic pain? methadone use      DVT Prophylaxis: Lovenox therapeutic, hold tonight for surgical intervention in am  Diet: DIET CARB CONTROL;   Dietary Nutrition Supplements: Wound Healing Oral Supplement  Code Status: Full Code    PT/OT Eval Status: Weight bearing precautions per podiatary    Dispo - per primary team    Tasia More MD  Hospitalist

## 2019-06-02 NOTE — PLAN OF CARE
Problem: Falls - Risk of:  Goal: Will remain free from falls  Description  Will remain free from falls  6/2/2019 0844 by Allison Alfredo RN  Note:    Pt free from injury or falls at this time, fall precautions in place, bed in low position, side rail up x2, Villavicencio Fall Risk: High (45 and higher), up with assist, calls with needs, call light in reach, will continue to monitor. Pt verbalizes understanding of fall risk procedures.        Problem: Pain:  Goal: Pain level will decrease  Description  Pain level will decrease  6/2/2019 0844 by Allison Alfredo RN  Note:   Pain controlled with Percocet

## 2019-06-02 NOTE — PROGRESS NOTES
Upon entering room patient was not able to to be easily arouse by staff, 4 nurses attempted rousing patient by saying her name and shaking her. Oxygen saturation was checked and found to be in the 60's she was observed at times to have pauses in breathing greater than 10 seconds. . A sternal rub was preformed and patient came to very quickly, she was alert and orient and unaware of what happen. She state that she had been told several times she needed to be test for sleep apnea.

## 2019-06-02 NOTE — PLAN OF CARE
Problem: Falls - Risk of:  Goal: Will remain free from falls  Description  Will remain free from falls  Outcome: Ongoing  Goal: Absence of physical injury  Description  Absence of physical injury  Outcome: Ongoing     Problem: Pain:  Goal: Pain level will decrease  Description  Pain level will decrease  Outcome: Ongoing  Goal: Control of acute pain  Description  Control of acute pain  Outcome: Ongoing  Goal: Control of chronic pain  Description  Control of chronic pain  Outcome: Ongoing     Problem: Nutrition  Goal: Optimal nutrition therapy  Outcome: Ongoing

## 2019-06-02 NOTE — PROGRESS NOTES
Podiatric Surgery Daily Progress Note  Te Ferguson  Subjective :   Patient seen and examined this am at the bedside. Discussed with patient at length the importance of getting her blood sugar under control and being compliant with WB status. Patient denies any acute overnight events. Patient denies N/V/F/C/SOB. Patient denies calf pain, thigh pain, chest pain. Review of Systems: A 12 point review of symptoms is unremarkable with the exception of the chief complaint. Patient specifically denies nausea, fever, vomiting, chills, shortness of breath, chest pain, abdominal pain, constipation or difficulty urinating. Objective     /70   Pulse 66   Temp 97.5 °F (36.4 °C) (Axillary)   Resp 16   Ht 5' 2\" (1.575 m)   Wt 190 lb (86.2 kg)   SpO2 94%   BMI 34.75 kg/m²      I/O:    Intake/Output Summary (Last 24 hours) at 6/2/2019 1650  Last data filed at 6/2/2019 1230  Gross per 24 hour   Intake 1080 ml   Output --   Net 1080 ml              Wt Readings from Last 3 Encounters:   05/31/19 190 lb (86.2 kg)   03/25/19 188 lb (85.3 kg)   02/11/19 188 lb (85.3 kg)       LABS:    Recent Labs     05/31/19  1608 06/02/19  0528   WBC 9.4 9.5   HGB 10.7* 11.1*   HCT 32.8* 33.9*   * 500*        Recent Labs     06/02/19  0528      K 5.1   CL 99   CO2 28   BUN 32*   CREATININE 1.4*        Recent Labs     06/02/19  0528   INR 1.04           LOWER EXTREMITY EXAMINATION  Dressing to B/L LE intact. VASCULAR: DP and PT pulses are palpable 1/4 b/l. CFT is brisk to the remaining digits of the foot, b/l. Skin temperature is warm to warm from proximal to distal with no focal calor noted, Right lower extremity. No edema noted, Right lower extremity. No pain with calf compression, Right lower extremity      NEUROLOGIC: Gross and epicritic sensation is diminished b/l. Protective sensation is diminished at all pedal sites b/l. DERMATOLOGIC: Wound noted to plantar aspect Right foot sub 4th metatarsal head. Wound measures  3.2 x 2.4 x 0.6 cm. Periwound maceration noted sub 4th metatarsal region, Right foot. Purulent and serous drainage noted. Wound does probe to bone. Wound does track to dorsum of Right foot and does undermine circumferentially approximately 1 cm in diameter. Malodor noted. Xerosis noted to the Right foot. Post debridement produced purulence. Wound noted to Left foot sub 1st metatarsal head, wound measures 1.8 x 1.8 x 0.2 cm. Wound base is fibrotic with periwound hyperkeratosis noted. No drainage noted. No malodor noted. Wound does not probe, track or undermine. Malodor noted. Wound noted to the lateral aspect of the Left 4th digit at the level of the PIPJ. Wound measures <0.5 cm in diameter. Wound bed is fibrotic. Wound does not probe, track or undermine.       MUSCULOSKELETAL: Muscle strength is 5/5 for all pedal groups tested. Pain with palpation to the plantar aspect of the Right foot at the level of the wound. 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 IMAGING:  R foot MRI done 6/1/19   Impression:   1. 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   2. Soft tissue ulceration of the lateral foot with subcutaneous emphysema. 3. 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 Given the proximity to this ulceration this is    suspicious for early involvement of osteomyelitis. 4. Moderate edema of the cuboid with transversely oriented T1 hypointense line suspicious for stress or insufficiency fracture. 5. Moderate edema of the posterior calcaneus with fracture line of the far posterior superior calcaneus. The edema is favored to be related to primary fracture rather than representing osteomyelitis with a secondary pathologic fracture. assessment and plan with Dr. Mariana Ruelas.     Ministerio Andrade, JEREDM   Podiatric Resident, PGY-2  Pager: [phone]  6/2/2019, 4:50 PM

## 2019-06-02 NOTE — PROGRESS NOTES
Clinical Pharmacy Consult Note    Admit date: 5/31/2019    Subjective/Objective:  53 yo female with PMH significant for DMII, diabetic foot ulcer, and h/o MRSA infection admitted directly from outpatient podiatry clinic d/t underlying bone exposed on her right root. Pt has been taking Augmentin since this past Saturday for her diabetic foot ulcer and was planned for surgery this week, but has been unable to see her PCP for clearance. She is now being admitted for IV antibiotics, an MRI to rule out possible underlying abscess or osteomyelitis, and surgical intervention. Pharmacy is consulted to dose Vancomycin per Dr. Barajas [place]    Pertinent Medications:  Vancomycin -- day # 3    1.5 g IV x 1 dose (5/31)   1.25 g IV q18h (scheduled to start 6/1)  Piperacillin/tazobactam 3.375 g IV q8h -- day #3    PERTINENT LABS  Recent Labs     05/31/19  1609 06/02/19  0528   * 138   K 5.4* 5.1   CL 97* 99   CO2 26 28   BUN 20 32*   CREATININE 1.2* 1.4*     Est CrCL 46 mL/min (based on SCr 1.4 mg/dL)  Height: 157 cm  Weight: 86.2 kg    No cultures collected. Assessment/Plan:  1. Diabetic foot ulcer / possible osteomyelitis: Vancomycin + Zosyn day #3  Vancomycin  · SCr up today at 1.4 from 1.2 on admission. · Continue vancomycin 1.25 g IV q18h. · Trough ordered tomorrow AM prior to dose due at 0645. Goal trough is 15-20 mcg/mL for diabetic foot ulcer and possible osteomyelitis. · Renal function will be monitored closely and dosing will be adjusted as appropriate. Please call with any questions.   Thank you for consulting pharmacy  Mata Cuellar, MargothD   PGY1 Pharmacy Resident   Wireless: [phone]  6/2/2019 12:14 PM

## 2019-06-02 NOTE — PROGRESS NOTES
Patient states pain well controlled with Percocet, up to bathroom with standby assist, patient assisted to position of comfort, dressing to both legs CDI, will continue to monitor

## 2019-06-03 PROBLEM — L08.9 DIABETIC FOOT INFECTION (HCC): Status: ACTIVE | Noted: 2019-06-03

## 2019-06-03 PROBLEM — F11.20 OPIATE DEPENDENCE (HCC): Status: ACTIVE | Noted: 2019-06-03

## 2019-06-03 PROBLEM — M86.471 CHRONIC OSTEOMYELITIS OF RIGHT FOOT WITH DRAINING SINUS (HCC): Status: ACTIVE | Noted: 2019-06-03

## 2019-06-03 PROBLEM — E11.628 DIABETIC FOOT INFECTION (HCC): Status: ACTIVE | Noted: 2019-06-03

## 2019-06-03 PROBLEM — E66.9 CLASS 1 OBESITY IN ADULT: Status: ACTIVE | Noted: 2019-06-03

## 2019-06-03 PROBLEM — N18.30 CKD (CHRONIC KIDNEY DISEASE), STAGE III (HCC): Status: ACTIVE | Noted: 2019-06-03

## 2019-06-03 LAB
ABO/RH: NORMAL
ALBUMIN SERPL-MCNC: 3 G/DL (ref 3.4–5)
ALBUMIN SERPL-MCNC: 3.5 G/DL (ref 3.4–5)
ANION GAP SERPL CALCULATED.3IONS-SCNC: 11 MMOL/L (ref 3–16)
ANION GAP SERPL CALCULATED.3IONS-SCNC: 11 MMOL/L (ref 3–16)
ANTIBODY IDENTIFICATION: NORMAL
ANTIBODY SCREEN: NORMAL
BASOPHILS ABSOLUTE: 0.1 K/UL (ref 0–0.2)
BASOPHILS RELATIVE PERCENT: 0.6 %
BUN BLDV-MCNC: 35 MG/DL (ref 7–20)
BUN BLDV-MCNC: 36 MG/DL (ref 7–20)
CALCIUM SERPL-MCNC: 8.9 MG/DL (ref 8.3–10.6)
CALCIUM SERPL-MCNC: 9.2 MG/DL (ref 8.3–10.6)
CHLORIDE BLD-SCNC: 101 MMOL/L (ref 99–110)
CHLORIDE BLD-SCNC: 101 MMOL/L (ref 99–110)
CO2: 25 MMOL/L (ref 21–32)
CO2: 26 MMOL/L (ref 21–32)
CREAT SERPL-MCNC: 1.5 MG/DL (ref 0.6–1.1)
CREAT SERPL-MCNC: 1.6 MG/DL (ref 0.6–1.1)
DAT IGG: NORMAL
EOSINOPHILS ABSOLUTE: 0.3 K/UL (ref 0–0.6)
EOSINOPHILS RELATIVE PERCENT: 2.3 %
ESTIMATED AVERAGE GLUCOSE: 303.4 MG/DL
GFR AFRICAN AMERICAN: 40
GFR AFRICAN AMERICAN: 43
GFR NON-AFRICAN AMERICAN: 33
GFR NON-AFRICAN AMERICAN: 36
GLUCOSE BLD-MCNC: 102 MG/DL (ref 70–99)
GLUCOSE BLD-MCNC: 109 MG/DL (ref 70–99)
GLUCOSE BLD-MCNC: 110 MG/DL (ref 70–99)
GLUCOSE BLD-MCNC: 114 MG/DL (ref 70–99)
GLUCOSE BLD-MCNC: 132 MG/DL (ref 70–99)
GLUCOSE BLD-MCNC: 236 MG/DL (ref 70–99)
HBA1C MFR BLD: 12.2 %
HCT VFR BLD CALC: 33.8 % (ref 36–48)
HEMOGLOBIN: 11 G/DL (ref 12–16)
INR BLD: 0.93 (ref 0.86–1.14)
LYMPHOCYTES ABSOLUTE: 2.8 K/UL (ref 1–5.1)
LYMPHOCYTES RELATIVE PERCENT: 20.4 %
MCH RBC QN AUTO: 28.6 PG (ref 26–34)
MCHC RBC AUTO-ENTMCNC: 32.6 G/DL (ref 31–36)
MCV RBC AUTO: 88 FL (ref 80–100)
MONOCYTES ABSOLUTE: 0.7 K/UL (ref 0–1.3)
MONOCYTES RELATIVE PERCENT: 4.8 %
NEUTROPHILS ABSOLUTE: 9.8 K/UL (ref 1.7–7.7)
NEUTROPHILS RELATIVE PERCENT: 71.9 %
PDW BLD-RTO: 15.6 % (ref 12.4–15.4)
PERFORMED ON: ABNORMAL
PHOSPHORUS: 4 MG/DL (ref 2.5–4.9)
PHOSPHORUS: 4.6 MG/DL (ref 2.5–4.9)
PLATELET # BLD: 432 K/UL (ref 135–450)
PMV BLD AUTO: 8.1 FL (ref 5–10.5)
POTASSIUM SERPL-SCNC: 5.1 MMOL/L (ref 3.5–5.1)
POTASSIUM SERPL-SCNC: 5.5 MMOL/L (ref 3.5–5.1)
PROTHROMBIN TIME: 10.6 SEC (ref 9.8–13)
RBC # BLD: 3.84 M/UL (ref 4–5.2)
SEDIMENTATION RATE, ERYTHROCYTE: 97 MM/HR (ref 0–30)
SODIUM BLD-SCNC: 137 MMOL/L (ref 136–145)
SODIUM BLD-SCNC: 138 MMOL/L (ref 136–145)
VANCOMYCIN TROUGH: 17.7 UG/ML (ref 10–20)
VANCOMYCIN TROUGH: 18.5 UG/ML (ref 10–20)
WBC # BLD: 13.6 K/UL (ref 4–11)

## 2019-06-03 PROCEDURE — 86850 RBC ANTIBODY SCREEN: CPT

## 2019-06-03 PROCEDURE — C1751 CATH, INF, PER/CENT/MIDLINE: HCPCS

## 2019-06-03 PROCEDURE — 36569 INSJ PICC 5 YR+ W/O IMAGING: CPT

## 2019-06-03 PROCEDURE — 02HV33Z INSERTION OF INFUSION DEVICE INTO SUPERIOR VENA CAVA, PERCUTANEOUS APPROACH: ICD-10-PCS | Performed by: PODIATRIST

## 2019-06-03 PROCEDURE — 6370000000 HC RX 637 (ALT 250 FOR IP): Performed by: STUDENT IN AN ORGANIZED HEALTH CARE EDUCATION/TRAINING PROGRAM

## 2019-06-03 PROCEDURE — 2580000003 HC RX 258: Performed by: STUDENT IN AN ORGANIZED HEALTH CARE EDUCATION/TRAINING PROGRAM

## 2019-06-03 PROCEDURE — 36415 COLL VENOUS BLD VENIPUNCTURE: CPT

## 2019-06-03 PROCEDURE — 6370000000 HC RX 637 (ALT 250 FOR IP): Performed by: INTERNAL MEDICINE

## 2019-06-03 PROCEDURE — 6360000002 HC RX W HCPCS: Performed by: STUDENT IN AN ORGANIZED HEALTH CARE EDUCATION/TRAINING PROGRAM

## 2019-06-03 PROCEDURE — 85652 RBC SED RATE AUTOMATED: CPT

## 2019-06-03 PROCEDURE — 85025 COMPLETE CBC W/AUTO DIFF WBC: CPT

## 2019-06-03 PROCEDURE — 86901 BLOOD TYPING SEROLOGIC RH(D): CPT

## 2019-06-03 PROCEDURE — 1200000000 HC SEMI PRIVATE

## 2019-06-03 PROCEDURE — 86922 COMPATIBILITY TEST ANTIGLOB: CPT

## 2019-06-03 PROCEDURE — 85610 PROTHROMBIN TIME: CPT

## 2019-06-03 PROCEDURE — 86870 RBC ANTIBODY IDENTIFICATION: CPT

## 2019-06-03 PROCEDURE — 99223 1ST HOSP IP/OBS HIGH 75: CPT | Performed by: INTERNAL MEDICINE

## 2019-06-03 PROCEDURE — 86880 COOMBS TEST DIRECT: CPT

## 2019-06-03 PROCEDURE — 80069 RENAL FUNCTION PANEL: CPT

## 2019-06-03 PROCEDURE — 86900 BLOOD TYPING SEROLOGIC ABO: CPT

## 2019-06-03 PROCEDURE — 80202 ASSAY OF VANCOMYCIN: CPT

## 2019-06-03 RX ADMIN — INSULIN LISPRO 3 UNITS: 100 INJECTION, SOLUTION INTRAVENOUS; SUBCUTANEOUS at 21:42

## 2019-06-03 RX ADMIN — HYOSCYAMINE SULFATE: 16 SOLUTION at 08:51

## 2019-06-03 RX ADMIN — ENOXAPARIN SODIUM 80 MG: 80 INJECTION SUBCUTANEOUS at 10:43

## 2019-06-03 RX ADMIN — AMITRIPTYLINE HYDROCHLORIDE 100 MG: 50 TABLET, FILM COATED ORAL at 21:35

## 2019-06-03 RX ADMIN — GABAPENTIN 800 MG: 400 CAPSULE ORAL at 17:06

## 2019-06-03 RX ADMIN — VANCOMYCIN HYDROCHLORIDE 1250 MG: 10 INJECTION, POWDER, LYOPHILIZED, FOR SOLUTION INTRAVENOUS at 10:45

## 2019-06-03 RX ADMIN — ASPIRIN 325 MG: 325 TABLET, DELAYED RELEASE ORAL at 10:43

## 2019-06-03 RX ADMIN — PIPERACILLIN SODIUM,TAZOBACTAM SODIUM 3.38 G: 3; .375 INJECTION, POWDER, FOR SOLUTION INTRAVENOUS at 21:38

## 2019-06-03 RX ADMIN — METOPROLOL SUCCINATE 50 MG: 50 TABLET, EXTENDED RELEASE ORAL at 21:36

## 2019-06-03 RX ADMIN — PIPERACILLIN SODIUM,TAZOBACTAM SODIUM 3.38 G: 3; .375 INJECTION, POWDER, FOR SOLUTION INTRAVENOUS at 13:34

## 2019-06-03 RX ADMIN — INSULIN LISPRO 8 UNITS: 100 INJECTION, SOLUTION INTRAVENOUS; SUBCUTANEOUS at 11:42

## 2019-06-03 RX ADMIN — Medication 10 ML: at 08:50

## 2019-06-03 RX ADMIN — COLLAGENASE SANTYL: 250 OINTMENT TOPICAL at 08:51

## 2019-06-03 RX ADMIN — PIPERACILLIN SODIUM,TAZOBACTAM SODIUM 3.38 G: 3; .375 INJECTION, POWDER, FOR SOLUTION INTRAVENOUS at 06:47

## 2019-06-03 RX ADMIN — GABAPENTIN 800 MG: 400 CAPSULE ORAL at 13:02

## 2019-06-03 RX ADMIN — OXYCODONE HYDROCHLORIDE AND ACETAMINOPHEN 1 TABLET: 5; 325 TABLET ORAL at 17:07

## 2019-06-03 RX ADMIN — Medication 10 ML: at 08:45

## 2019-06-03 RX ADMIN — ATORVASTATIN CALCIUM 20 MG: 20 TABLET, FILM COATED ORAL at 21:36

## 2019-06-03 RX ADMIN — ENOXAPARIN SODIUM 80 MG: 80 INJECTION SUBCUTANEOUS at 21:37

## 2019-06-03 RX ADMIN — Medication 140 MG: at 08:48

## 2019-06-03 RX ADMIN — GABAPENTIN 800 MG: 400 CAPSULE ORAL at 21:36

## 2019-06-03 RX ADMIN — PANTOPRAZOLE SODIUM 40 MG: 40 TABLET, DELAYED RELEASE ORAL at 06:40

## 2019-06-03 RX ADMIN — OXYCODONE HYDROCHLORIDE AND ACETAMINOPHEN 2 TABLET: 5; 325 TABLET ORAL at 06:40

## 2019-06-03 RX ADMIN — GABAPENTIN 800 MG: 400 CAPSULE ORAL at 08:48

## 2019-06-03 RX ADMIN — INSULIN GLARGINE 40 UNITS: 100 INJECTION, SOLUTION SUBCUTANEOUS at 21:42

## 2019-06-03 RX ADMIN — OXYCODONE HYDROCHLORIDE AND ACETAMINOPHEN 2 TABLET: 5; 325 TABLET ORAL at 00:16

## 2019-06-03 ASSESSMENT — PAIN DESCRIPTION - FREQUENCY
FREQUENCY: CONTINUOUS
FREQUENCY: CONTINUOUS

## 2019-06-03 ASSESSMENT — PAIN DESCRIPTION - DESCRIPTORS
DESCRIPTORS: ACHING
DESCRIPTORS: ACHING

## 2019-06-03 ASSESSMENT — PAIN SCALES - GENERAL
PAINLEVEL_OUTOF10: 7
PAINLEVEL_OUTOF10: 3
PAINLEVEL_OUTOF10: 3
PAINLEVEL_OUTOF10: 1
PAINLEVEL_OUTOF10: 6
PAINLEVEL_OUTOF10: 7
PAINLEVEL_OUTOF10: 4

## 2019-06-03 ASSESSMENT — PAIN DESCRIPTION - PAIN TYPE
TYPE: ACUTE PAIN
TYPE: ACUTE PAIN

## 2019-06-03 ASSESSMENT — PAIN DESCRIPTION - ORIENTATION
ORIENTATION: RIGHT
ORIENTATION: RIGHT;LEFT

## 2019-06-03 ASSESSMENT — PAIN DESCRIPTION - PROGRESSION
CLINICAL_PROGRESSION: GRADUALLY IMPROVING
CLINICAL_PROGRESSION: GRADUALLY WORSENING

## 2019-06-03 ASSESSMENT — PAIN DESCRIPTION - ONSET
ONSET: PROGRESSIVE
ONSET: ON-GOING

## 2019-06-03 ASSESSMENT — PAIN - FUNCTIONAL ASSESSMENT
PAIN_FUNCTIONAL_ASSESSMENT: PREVENTS OR INTERFERES SOME ACTIVE ACTIVITIES AND ADLS
PAIN_FUNCTIONAL_ASSESSMENT: ACTIVITIES ARE NOT PREVENTED

## 2019-06-03 ASSESSMENT — PAIN DESCRIPTION - LOCATION
LOCATION: FOOT
LOCATION: FOOT

## 2019-06-03 NOTE — CONSULTS
Infectious Diseases Inpatient Consult Note     RESIDENT NOTE - reviewed / edited, attending note at bottom    Reason for Consult:   Diabetic foot infection, right   Requesting Physician:   Dr. Misbah Lund   Primary Care Physician:  Jacqueline Taylor MD  History Obtained From:   Pt, EPIC    Admit Date: 2019  Hospital Day: 4    CHIEF COMPLAINT:    Diabetic foot infection, right     HISTORY OF PRESENT ILLNESS:      53 yo female with PMHx  DMII, diabetic neuropathy, non-compliancy was admitted for right diabetic foot infection. Patient was sent from [facility] on Friday for her non-healing wounds and noted to be her 4th met head showing during encounter. Patient states she has been in pain to her right foot. She also admits to have putting weight on it. Patient has a wound to her left foot but states it is stable at this time. Patient denies f/c/n/v/sob/cp.     Past Medical History:    Past Medical History:   Diagnosis Date    Amenorrhea     Anomalies of nails     Asthma 04    Athlete's foot 2010    Bacterial vaginosis 2008    Carpal tunnel syndrome 2007    [facility]) [facility]     Diabetes mellitus type II 2007    Diabetic neuropathy ([facility] 8/10    DVT (deep venous thrombosis) ([facility] 3/2004    Dyslipidemia 2009    Dyspareunia 2009    ETOH abuse 3/04/2007    Feet clawing     HTN (hypertension)     Hx of blood clots     Hyperlipidemia     MRSA (methicillin resistant staph aureus) culture positive 2017; 2017    foot; leg     Neuropathy 2009    polyneuropathy    Pain, back 2008    Pain, eye, right 04    Pancreatitis 04    Pseudocyst, pancreas 04    Scalp lesion 2007    Tinea pedis     Tobacco abuse 2008    Vaginal bleeding, abnormal 2007       Past Surgical History:    Past Surgical History:   Procedure Laterality Date     SECTION  unknown    FOOT DEBRIDEMENT Right 2019 INCISION AND DRAINAGE WITH APPLICATION OF STRAVIX GRAFT RIGHT FOOT performed by Lo Mcallister DPM at [address],[facility] [address] Left 05/25/2016    I & D left foot    OTHER SURGICAL HISTORY Right 10/20/2017    RIGHT GASTROC LENGTHENING ENDOSCOPIC, INJECTION OF AMNI GRAFT    OTHER SURGICAL HISTORY Right 04/26/2018    Diabetic foot ulcer I&D w/ integra graft application    [place] DEBRIDEMENT, SKIN, SUB-Q TISSUE,MUSCLE,BONE,=<20 SQ CM Right 8/17/2018    RIGHT FOOT DEBRIDEMENT INCISION AND DRAINAGE, PARTIAL 5TH RAY AMPUTATION performed by Lo Mcallister DPM at [address] [facility] [address] [facility] [address] / [address]  7/7003    cryotherapy done on lesion    TOE AMPUTATION Left 02/24/2017    AMPUTATION LEFT GREAT TOE                    Current Medications:     [START ON 6/4/2019] vancomycin  1,250 mg Intravenous Q24H    piperacillin-tazobactam  3.375 g Intravenous Q8H    insulin lispro  0-18 Units Subcutaneous TID WC    insulin lispro  0-9 Units Subcutaneous Nightly    sodium hypochlorite   Irrigation Daily    lidocaine 1 % injection  5 mL Intradermal Once    sodium chloride flush  10 mL Intravenous 2 times per day    enoxaparin  80 mg Subcutaneous BID    amitriptyline  100 mg Oral Nightly    aspirin  325 mg Oral Daily    atorvastatin  20 mg Oral Nightly    [Held by provider] furosemide  40 mg Oral BID    gabapentin  800 mg Oral 4x Daily    methadone  140 mg Oral Daily    metoprolol succinate  50 mg Oral Nightly    pantoprazole  40 mg Oral QAM AC    collagenase   Topical Daily    insulin glargine  40 Units Subcutaneous BID    insulin lispro  8 Units Subcutaneous TID WC    insulin lispro  8 Units Subcutaneous Once       Allergies:  Sulfa antibiotics    Social History:    Social History     Socioeconomic History    Marital status:      Spouse name: Not on file    Number of children: Not on file    Years of education: Not on file    Highest education level: Not on file   Occupational History    Occupation: NA   Social Needs    Financial resource strain: Not on file    Food insecurity:     Worry: Not on file     Inability: Not on file    Transportation needs:     Medical: Not on file     Non-medical: Not on file   Tobacco Use    Smoking status: Current Some Day Smoker     Packs/day: 1.00     Years: 30.00     Pack years: 30.00     Types: Cigarettes     Last attempt to quit: 2018     Years since quittin.1    Smokeless tobacco: Never Used    Tobacco comment: 1-2 cig/day   Substance and Sexual Activity    Alcohol use: No     Alcohol/week: 2.4 - 3.0 oz     Types: 4 - 5 Standard drinks or equivalent per week     Comment: hx of etoh abuse, denies recent etoh use    Drug use: No     Comment: Methodone Maintance    Sexual activity: Not on file   Lifestyle    Physical activity:     Days per week: Not on file     Minutes per session: Not on file    Stress: Not on file   Relationships    Social connections:     Talks on phone: Not on file     Gets together: Not on file     Attends Advent service: Not on file     Active member of club or organization: Not on file     Attends meetings of clubs or organizations: Not on file     Relationship status: Not on file    Intimate partner violence:     Fear of current or ex partner: Not on file     Emotionally abused: Not on file     Physically abused: Not on file     Forced sexual activity: Not on file   Other Topics Concern    Not on file   Social History Narrative    Not on file         Family History:   No immunodeficiency    REVIEW OF SYSTEMS:    No fever / chills / sweats. No weight loss. No visual change, eye pain, eye discharge. No oral lesion, sore throat, dysphagia. Denies cough / sputum. Denies chest pain, palpitations. Denies n / v / abd pain. No diarrhea. Denies dysuria or change in urinary function. Denies joint swelling or pain. No myalgia, arthralgia.   Denies skin changes, itching  Denies focal weakness, sensory change or other neurologic symptom    Denies new / worse depression, psychiatric symptoms  Denies any Endocrine or Hematologic symptoms    PHYSICAL EXAM:      Vitals:    /77   Pulse 66   Temp 98.4 °F (36.9 °C) (Oral)   Resp 16   Ht 5' 2\" (1.575 m)   Wt 190 lb (86.2 kg)   SpO2 90%   BMI 34.75 kg/m²     GENERAL: No apparent distress. HEENT: Membranes moist, no oral lesion  NECK:  Supple  LUNGS: Clear b/l, no rales, no dullness  CARDIAC: RRR, no murmur appreciated  ABD:  + BS, soft / NT  EXT:  No rash, no edema, no lesions  NEURO: No focal neurologic findings  PSYCH: Orientation, sensorium, mood normal  LINES:  Peripheral iv              DATA:    Lab Results   Component Value Date    WBC 13.6 (H) 06/03/2019    HGB 11.0 (L) 06/03/2019    HCT 33.8 (L) 06/03/2019    MCV 88.0 06/03/2019     06/03/2019     Lab Results   Component Value Date    CREATININE 1.5 (H) 06/03/2019    BUN 36 (H) 06/03/2019     06/03/2019    K 5.5 (H) 06/03/2019     06/03/2019    CO2 25 06/03/2019       Hepatic Function Panel:   Lab Results   Component Value Date    ALKPHOS 211 01/04/2019    ALT 23 01/04/2019    AST 26 01/04/2019    PROT 6.8 01/04/2019    PROT 6.6 07/21/2011    BILITOT <0.2 01/04/2019    BILIDIR <0.2 03/27/2018    IBILI see below 03/27/2018    LABALBU 3.0 06/03/2019       Imaging:   Xray       Findings worrisome for osteomyelitis of the head of the fourth metacarpal.       Changes of the posterior calcaneus which may be postsurgical, infectious or posttraumatic. Please correlate clinically         MRI: RIGHT    Impression:   1. 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   2. Soft tissue ulceration of the lateral foot with subcutaneous emphysema.    3. 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 Given the proximity to this ulceration this is    suspicious for early involvement of osteomyelitis. 4. Moderate edema of the cuboid with transversely oriented T1 hypointense line suspicious for stress or insufficiency fracture. 5. Moderate edema of the posterior calcaneus with fracture line of the far posterior superior calcaneus. The edema is favored to be related to primary fracture rather than representing osteomyelitis with a secondary pathologic fracture. 7. Slight edema without abnormal T1 signal of the third digit metatarsal head and proximal phalanx likely reactive. 8. Subcutaneous edema consistent with cellulitis. 9. Generalized edema of the musculature consistent with chronic small vessel ischemic changes some/denervation from diabetes. 10. Degenerative changes as above. IMPRESSION:      Patient Active Problem List   Diagnosis    Feet clawing    Diabetic neuropathy (HCC)    Tinea pedis    Dyslipidemia    HTN (hypertension)    Amenorrhea    Dyspareunia    Neuropathy    Bacterial vaginosis    Pain in back    Anomalies of nails    Tobacco abuse    Vaginal bleeding, abnormal    Carpal tunnel syndrome    Scalp lesion    ETOH abuse    DM type 2, uncontrolled, with lower extremity ulcer (HCC)    Pseudocyst, pancreas    Pancreatitis    Asthma    Pain, eye, right    Pneumonia    Nicotine addiction    Acute pancreatitis    Hypoxia    Onychomycosis    Depressive disorder, not elsewhere classified    Anxiety state    Tinea pedis    Open wound of left foot    Burn    Obesity (BMI 30-39. 9)    S/P foot surgery, right    Pain medication agreement signed    Post-op pain    Open wound of left foot with complication    Cellulitis    Diabetic foot infection ([address]    Bilateral lower extremity edema    Chronic multifocal osteomyelitis of left foot (HCC)    Open wound of right foot    Diabetic ulcer of right foot associated with type 2 diabetes mellitus, with bone involvement without evidence of necrosis (HCC)    Cellulitis of right foot    Acute systolic congestive heart failure (HCC)    Acute osteomyelitis of metatarsal bone of right foot (HCC)    Cellulitis of right lower extremity    Cast discomfort    Bronchitis    Cellulitis and abscess of foot    Diabetic polyneuropathy associated with type 2 diabetes mellitus ([address]    Group B streptococcal infection    Diabetic ulcer of left foot associated with type 2 diabetes mellitus, with fat layer exposed ([address]    Tendonitis, Achilles, right    Diabetic ulcer of right foot due to type 2 diabetes mellitus ([address]    Opiate dependence ([address]    Class 1 obesity in adult    CKD (chronic kidney disease), stage III ([address]    Diabetic foot infection ([address]       ASSESSMENT AND PLAN:    44 yo woman with hx obesity, DM, neuropathy.    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  L foot wound 10/2017 MRSA, E faecalis. Admit 8/14/18 with R foot infection. Wound cult + light mixed skin sukhjinder, heavy E cloacae, moderate E coli    OR 8/17, R partial 5th ray resectoin.  Discharged on iv ertapenem / po linezolid x 6 weeks.(through 9/26/18)     Admit 1/4/19 with R leg cellulitis.  Cult + heavy mixed skin sukhjinder, heavy E faecalis, heavy Ps aeurginosa, heavy C albicans. Discharged on po augmentin + ciprofloxacin     Admit 3/25 with R foot cellulitis, with new wound. Discharged on 7 day course of augmentin. R leg doppler DVT, DC with eliquis. Admit 6/3 with diabetic foot infection and osteomyelitis to the 4th digit and metatarsal and remnant of 5th met along with stress fracture to cuboid/calcaneus of right foot. ESR 97, WBC 13.6.    Wound culture results: Enterobacter cloacae  Currently on abx: IV vanc/zosyn        PLAN:   -Will discuss with Dr. Bita Antonio in regards to IV abx.   -Plan on TMA on Wednesday.  -Wound care and surgery per podiatry     Jose Schuster DPM     Addendum to Resident Consult note:  Pt seen, examined and evaluated. I have reviewed the current history, physical findings, labs and assessment and plan and agree with note as documented by resident (Dr. Codey Braxton). 46 yo woman with hx obesity, DM, neuropathy. Hx L partial hallux amp 2/2017 (cult + MRSA, E faecalis)  Hx R partial 5th ray resection 8/17/18, cult E cloacae, Ecoli  Hx R foot wound infection 1/2019, cult + E faecalis, Ps aeruginosa, C albicans  Hx R foot wound infection 3/2019, cult + GBS    Admit 5/31 - seen at [facility] and referred to [facility] for admission. R foot wound with exposed 4th MT head. Wound worse over time with bloody drain and pain per pt  Afebile, WBC 9.4, cult light growth E cloacae. Started on vancomycin + zosyn  MRI with 4th MT and 4th prox phalanx destruction    IMP/  R DM foot wound with extension osteomyelitis   - has had GNR, E faecalis, GBS in past   - cult now with E cloacae   - will need surgical debridement.   TMA recommended by Podiatry    REC/  Cont zosyn  D/c vancomycin  Surgical debridement per Podiatry - scheduled for 6/5    Discharge antibiotic plan depends on final culture results, surgery (if definitive)  Discussed with pt  Miguel Che MD

## 2019-06-03 NOTE — PLAN OF CARE
Problem: Falls - Risk of:  Goal: Will remain free from falls  Description  Will remain free from falls  6/3/2019 0920 by Renan Milner RN  Outcome: Ongoing  Note:   Patient has remained free from falls. Bed is low and locked, side rails up 2/4, non skid socks on patient. Call light and bedside table are within reach. Patient calls out appropriately. Will continue to monitor. Problem: Pain:  Goal: Pain level will decrease  Description  Pain level will decrease  6/3/2019 0920 by Renan Milner RN  Outcome: Ongoing  Note:   Patient denies pain at this time. Will continue to monitor.

## 2019-06-03 NOTE — PROGRESS NOTES
foot sub 4th metatarsal head. Wound measures  3.2 x 2.4 x 0.6 cm. Periwound maceration noted sub 4th metatarsal region, Right foot. Purulent and serous drainage noted. Wound does probe to bone. Wound does track to dorsum of Right foot and does undermine circumferentially approximately 1 cm in diameter. Malodor noted. Xerosis noted to the Right foot. Post debridement produced purulence. Wound noted to Left foot sub 1st metatarsal head, wound measures 1.8 x 1.8 x 0.2 cm. Wound base is fibrotic with periwound hyperkeratosis noted. No drainage noted. No malodor noted. Wound does not probe, track or undermine. Malodor noted. Wound noted to the lateral aspect of the Left 4th digit at the level of the PIPJ. Wound measures <0.5 cm in diameter. Wound bed is fibrotic. Wound does not probe, track or undermine.       MUSCULOSKELETAL: Muscle strength is 5/5 for all pedal groups tested. Pain with palpation to the plantar aspect of the Right foot at the level of the wound. 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 IMAGING:  R foot MRI done 6/1/19   Impression:   1. 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   2. Soft tissue ulceration of the lateral foot with subcutaneous emphysema. 3. 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 Given the proximity to this ulceration this is    suspicious for early involvement of osteomyelitis. 4. Moderate edema of the cuboid with transversely oriented T1 hypointense line suspicious for stress or insufficiency fracture. 5. Moderate edema of the posterior calcaneus with fracture line of the far posterior superior calcaneus.  The edema is favored to be related to primary fracture rather than representing osteomyelitis with a secondary pathologic fracture. 7. Slight edema without abnormal T1 signal of the third digit metatarsal head and proximal phalanx likely reactive. 8. Subcutaneous edema consistent with cellulitis. 9. Generalized edema of the musculature consistent with chronic small vessel ischemic changes some/denervation from diabetes. 10. Degenerative changes as above. ASSESSMENT/PLAN  1. Neuropathic ulceration sub 4th metatarsal head, Right foot 2/2 pressure-Carpenter grade III  2. Neuropathic ulceration 2/2 DM, sub 1st metatarsal head, Left foot-Carpenter grade I  3. Neuropathic ulcerated 2/2 pressure lateral aspect of 4th digit at the level of the PIPJ, Left foot-Carpenter grade I  4. Stress fx of Right cuboid   5. Primary fx of Right calcaneus   6. DM Type II uncontrolled with peripheral neuropathy  7. History of Non-compliance  8. Personal History of Nicotine and opioid Dependence    - VSS; Patient afebrile;  leukocytosis noted (WBC 13.6), ESR 97, CRP 22  -Xray Right foot shows changes consistent with osteo of 4th met head, and new fx at the neck. - MRI results noted above   -Wound culture from clinic obtained, Growing Enterobacter Cloacae, ID recs appreciated. - wounds dressed today with santyl to the L and dakins to the R , DSD, and ace, ordered post op shoe for R foot heel wb only B/L, post op will be non WB to R foot.   -Percocet pain panel started for pain management  -Hospitalist consulted for medical management and surgical clearance, poorly controlled DM with HgA1C of 12.6  -Social work consulted for discharge planning      Diet: carb control then NPO after midnight  DVT PPV: Lovenox given now, then held for  surgery tomorrow. Rodo Drape  DISPO: MRI shows osteo of 4/5th mets in right foot with cuboid and calcaneal fx, discussed surgical options , to the OR for initial I&D with staged [place] Booze amputation Right foot, wednesday afternoon. Discussed assessment and plan with Dr. Jaime Breen.     Zachary Lema Judith Gallo   Podiatric Resident, PGY-2  Pager: [phone]  6/3/2019, 10:30 AM

## 2019-06-03 NOTE — PROGRESS NOTES
Resident Progress Note    Admit Date: 2019    PCP: Eric Ventura MD                  : 1965  MRN: [id number]  CC: Right foot pain    Subjective: Interval History:   Patient seen today without any complaints of headaches, SOB, Chest pain, Palpitations, abdominal pain, nausea or vomiting. Diet: Dietary Nutrition Supplements: Wound Healing Oral Supplement  Diet NPO, After Midnight      Data:   Scheduled Meds:   piperacillin-tazobactam  3.375 g Intravenous Q8H    vancomycin  1,250 mg Intravenous Q18H    insulin lispro  0-18 Units Subcutaneous TID WC    insulin lispro  0-9 Units Subcutaneous Nightly    sodium hypochlorite   Irrigation Daily    lidocaine 1 % injection  5 mL Intradermal Once    sodium chloride flush  10 mL Intravenous 2 times per day    [Held by provider] enoxaparin  80 mg Subcutaneous BID    amitriptyline  100 mg Oral Nightly    aspirin  325 mg Oral Daily    atorvastatin  20 mg Oral Nightly    [Held by provider] furosemide  40 mg Oral BID    gabapentin  800 mg Oral 4x Daily    methadone  140 mg Oral Daily    metoprolol succinate  50 mg Oral Nightly    pantoprazole  40 mg Oral QAM AC    collagenase   Topical Daily    insulin glargine  40 Units Subcutaneous BID    insulin lispro  8 Units Subcutaneous TID WC    insulin lispro  8 Units Subcutaneous Once     Continuous Infusions:   dextrose       PRN Meds:sodium chloride flush, albuterol, naloxone, diphenhydrAMINE, acetaminophen, docusate sodium, ondansetron, promethazine, glucose, dextrose, glucagon (rDNA), dextrose, oxyCODONE-acetaminophen **OR** oxyCODONE-acetaminophen  I/O last 3 completed shifts: In: 840 [P.O.:840]  Out: -   No intake/output data recorded.     Intake/Output Summary (Last 24 hours) at 6/3/2019 0747  Last data filed at 2019 1822  Gross per 24 hour   Intake 840 ml   Output --   Net 840 ml           Objective:     Vitals: BP (!) 155/77   Pulse 73   Temp 98 °F (36.7 °C) (Oral)   Resp 16   Ht 5' 2\" (1.575 m)   Wt 190 lb (86.2 kg)   SpO2 96%   BMI 34.75 kg/m²     Physical Exam   Constitutional: She is oriented to person, place, and time. She appears well-developed and well-nourished. HENT:   Head: Normocephalic and atraumatic. Eyes: Pupils are equal, round, and reactive to light. EOM are normal.   Neck: Normal range of motion. Neck supple. No JVD present. Cardiovascular: Normal rate, regular rhythm and normal heart sounds. Exam reveals no friction rub. No murmur heard. Pulmonary/Chest: Effort normal and breath sounds normal. No stridor. No respiratory distress. She has no wheezes. Abdominal: Soft. Bowel sounds are normal. She exhibits no distension and no mass. There is no tenderness. There is no guarding. Musculoskeletal: Normal range of motion. She exhibits no edema. Bilateral foot ulcers   Neurological: She is alert and oriented to person, place, and time. No cranial nerve deficit. Skin: Skin is warm and dry. Capillary refill takes less than 2 seconds. Psychiatric: She has a normal mood and affect. LABS:    CBC:   Recent Labs     05/31/19  1608 06/02/19  0528 06/03/19  0629   WBC 9.4 9.5 13.6*   HGB 10.7* 11.1* 11.0*   HCT 32.8* 33.9* 33.8*   MCV 87.3 86.6 88.0   * 500* 432                                                                BMP:    Recent Labs     05/31/19  1609 06/02/19  0528 06/03/19  0634   * 138 137   K 5.4* 5.1 5.5*   CL 97* 99 101   CO2 26 28 25   BUN 20 32* 36*   CREATININE 1.2* 1.4* 1.5*   GLUCOSE 396* 139* 109*       LFT's: No results for input(s): AST, ALT, ALB, BILITOT, ALKPHOS in the last 72 hours. Troponin: No results for input(s): TROPONINI in the last 72 hours. BNP: No results for input(s): BNP in the last 72 hours. Lipids: No results for input(s): CHOL, HDL in the last 72 hours.     Invalid input(s): LDLCALCU    ABGs:   Lab Results   Component Value Date    PHART 7.48 07/19/2011    NQN2XMG 49 07/19/2011    PO2ART 59 Assessment/Plan:   Patient is 51 y/o female with PMHx of Diabetes, diabetic foot ulcer, HLD presenting to [facility] as a direct admit from podiatry clinic with concerns for Osteomyelitis.  Internal medicine consulted for medical management of co morbidities.      Osteomyelitis 4 metatarsal head  - On Vanc/Zosyn per Podiatry  - ID consulted per podiatry  - MRI Positive for Osteomyelitis   - awaiting I&D tomorrow  - will discuss with Podiatry when we can restart anticoagulation    DM2  - Patient restarted on home Lantus 40 units BID  - Premeal insulin 8 units  - Low dose sliding scale  - Hypo glycemic protocol  - Currently well controlled    Hyperkalemia likely 2/2 RTA4  - elevated to 5.5 today  - will check repeat Potassium      History of DVT  - On Therapeutic lovenox in light of pending surgery  - holding pm dose for procedure tomorrow     CHFpEF compensated  - On toprol XL 50 mg  - continue Lasix 40 mg BID  - Nuclear stress test performed in 8-16/2018 negative for ischemia and EF 63%    Code Status: Full  FEN: NPO after midnight  PPx: Lovenox held for surgery tomorrow  Dispo: [facility]    Plan discussed with Dr. Joseph Quintero MD  6/3/2019  7:47 AM

## 2019-06-03 NOTE — PLAN OF CARE
Problem: Falls - Risk of:  Goal: Will remain free from falls  Description  Will remain free from falls  Note:    Pt free from injury or falls at this time, fall precautions in place, bed in low position, side rail up x2, Villavicencio Fall Risk: Medium (25-44), up with assist, calls with needs, call light in reach, will continue to monitor. Pt verbalizes understanding of fall risk procedures.        Problem: Pain:  Goal: Pain level will decrease  Description  Pain level will decrease  Note:   Pain well controlled with Percocet, patient up to bathroom with assist     Problem: Pain:  Goal: Control of acute pain  Description  Control of acute pain  Note:   Pain controlled with Percocet

## 2019-06-03 NOTE — PROGRESS NOTES
Patient alert and oriented, VSS. Neuro WNL. Dressing to bilateral feet are clean, dry and intact. Patient ambulating with walking shoes when OOB. Intermittent antibiotics throughout the day. Patient waiting to get PICC line today. Patient very drowsy and lethargic throughout the afternoon, wakes easily to voice. Patient not medicated for pain as patient rates no pain. Patient tolerating diet. Will continue to monitor.

## 2019-06-03 NOTE — PROGRESS NOTES
Clinical Pharmacy Consult Note    Admit date: 5/31/2019    Subjective/Objective:  51 yo female with PMH significant for DMII, diabetic foot ulcer, and h/o MRSA infection admitted directly from outpatient podiatry clinic d/t underlying bone exposed on her right root. Pt has been taking Augmentin since this past Saturday for her diabetic foot ulcer and was planned for surgery this week, but has been unable to see her PCP for clearance. She is now being admitted for IV antibiotics, an MRI to rule out possible underlying abscess or osteomyelitis, and surgical intervention. Pharmacy is consulted to dose Vancomycin per Dr. Brandee Pereyra    Pertinent Medications:  Vancomycin -- day # 4   1.5 g IV x 1 dose (5/31)   1.25 g IV q18h (6/1-6/3)  Piperacillin/tazobactam 3.375 g IV q8h -- day #4    PERTINENT LABS  Recent Labs     06/02/19  0528 06/03/19  0634    137   K 5.1 5.5*   CL 99 101   CO2 28 25   PHOS  --  4.0   BUN 32* 36*   CREATININE 1.4* 1.5*     Recent Labs     06/02/19  0528 06/03/19  0629   WBC 9.5 13.6*   HGB 11.1* 11.0*   HCT 33.9* 33.8*   MCV 86.6 88.0   * 432     Vancomycin Levels:  Date / Time Type Result   6/3 at 06:29 Trough 18.5 mcg/mL   6/3 at 10:49 Trough 17.7 mcg/mL     Est CrCL 46 mL/min (based on SCr 1.4 mg/dL)  Height: 157 cm  Weight: 86.2 kg    Interval update (6/3):  Growing Enterobacter Cloacae per wound clinic results. Wound treated with dakins at bedside  Remains afebrile but WBC up to 13.6  ID now consulted. There have been ongoing issues with the timing of the vancomycin dosing. Patient has one IV line and per RN patient is a \"tough stick. \"  While q18h dosing has been ordered, doses have repeatedly been given in the q20-23 hour range due to the incompatibility of zosyn and vancomycin. Repeated AM trough today at closer to 24 hr dosing with a trough of 17.7 (RN reports 2nd trough taken before vancomycin actually started). Assessment/Plan:  1.  Diabetic foot ulcer / possible osteomyelitis: Vancomycin + Zosyn day #4  Vancomycin  · Given issues with dose timing, will change to Vancomycin 1.25 g q24h. Dosing time should NOT conflict with extended infusion Zosyn moving forward. · Given AM troughs and patient's slight increase in Cr, anticipate therapeutic trough (15-20 mcg/mL) moving forward. · Will re-order trough for tomorrow AM to make sure we are remaining on the correct path. · Awaiting surgery tomorrow and ID now on board. Please call with any questions. Thank you for consulting pharmacy!   James JustinD, [facility] 6/3/2019 3:25 PM  Wireless: [facility] pharmacy: [id number]

## 2019-06-03 NOTE — PROGRESS NOTES
Patient's right foot dressing has moderate amount of bloody strikethrough after patient walked to bathroom without using shoe.  Patient educated on importance of wearing shoe when up, right foot dressing reinforced with gauze and ACE wrap, assisted to position of comfort, patient encouraged to call with needs, call light in reach, items within reach, will continue to monitor

## 2019-06-04 LAB
ANION GAP SERPL CALCULATED.3IONS-SCNC: 10 MMOL/L (ref 3–16)
BASOPHILS ABSOLUTE: 0 K/UL (ref 0–0.2)
BASOPHILS RELATIVE PERCENT: 0.3 %
BUN BLDV-MCNC: 34 MG/DL (ref 7–20)
CALCIUM SERPL-MCNC: 9.3 MG/DL (ref 8.3–10.6)
CHLORIDE BLD-SCNC: 101 MMOL/L (ref 99–110)
CO2: 26 MMOL/L (ref 21–32)
CREAT SERPL-MCNC: 1.6 MG/DL (ref 0.6–1.1)
EOSINOPHILS ABSOLUTE: 0.2 K/UL (ref 0–0.6)
EOSINOPHILS RELATIVE PERCENT: 1.9 %
GFR AFRICAN AMERICAN: 40
GFR NON-AFRICAN AMERICAN: 33
GLUCOSE BLD-MCNC: 147 MG/DL (ref 70–99)
GLUCOSE BLD-MCNC: 154 MG/DL (ref 70–99)
GLUCOSE BLD-MCNC: 189 MG/DL (ref 70–99)
GLUCOSE BLD-MCNC: 333 MG/DL (ref 70–99)
GLUCOSE BLD-MCNC: 78 MG/DL (ref 70–99)
GLUCOSE BLD-MCNC: 90 MG/DL (ref 70–99)
HCT VFR BLD CALC: 31.9 % (ref 36–48)
HEMOGLOBIN: 10.6 G/DL (ref 12–16)
INR BLD: 0.98 (ref 0.86–1.14)
LYMPHOCYTES ABSOLUTE: 2.3 K/UL (ref 1–5.1)
LYMPHOCYTES RELATIVE PERCENT: 19.3 %
MCH RBC QN AUTO: 28.7 PG (ref 26–34)
MCHC RBC AUTO-ENTMCNC: 33.1 G/DL (ref 31–36)
MCV RBC AUTO: 86.6 FL (ref 80–100)
MONOCYTES ABSOLUTE: 0.5 K/UL (ref 0–1.3)
MONOCYTES RELATIVE PERCENT: 4.6 %
NEUTROPHILS ABSOLUTE: 8.7 K/UL (ref 1.7–7.7)
NEUTROPHILS RELATIVE PERCENT: 73.9 %
PDW BLD-RTO: 15.8 % (ref 12.4–15.4)
PERFORMED ON: ABNORMAL
PERFORMED ON: NORMAL
PERFORMED ON: NORMAL
PLATELET # BLD: 453 K/UL (ref 135–450)
PMV BLD AUTO: 7.9 FL (ref 5–10.5)
POTASSIUM SERPL-SCNC: 4.8 MMOL/L (ref 3.5–5.1)
PROTHROMBIN TIME: 11.2 SEC (ref 9.8–13)
RBC # BLD: 3.68 M/UL (ref 4–5.2)
SEDIMENTATION RATE, ERYTHROCYTE: 111 MM/HR (ref 0–30)
SODIUM BLD-SCNC: 137 MMOL/L (ref 136–145)
WBC # BLD: 11.8 K/UL (ref 4–11)

## 2019-06-04 PROCEDURE — 2580000003 HC RX 258: Performed by: STUDENT IN AN ORGANIZED HEALTH CARE EDUCATION/TRAINING PROGRAM

## 2019-06-04 PROCEDURE — 6370000000 HC RX 637 (ALT 250 FOR IP): Performed by: STUDENT IN AN ORGANIZED HEALTH CARE EDUCATION/TRAINING PROGRAM

## 2019-06-04 PROCEDURE — 1200000000 HC SEMI PRIVATE

## 2019-06-04 PROCEDURE — 6370000000 HC RX 637 (ALT 250 FOR IP): Performed by: INTERNAL MEDICINE

## 2019-06-04 PROCEDURE — 99232 SBSQ HOSP IP/OBS MODERATE 35: CPT | Performed by: INTERNAL MEDICINE

## 2019-06-04 PROCEDURE — 85652 RBC SED RATE AUTOMATED: CPT

## 2019-06-04 PROCEDURE — 80048 BASIC METABOLIC PNL TOTAL CA: CPT

## 2019-06-04 PROCEDURE — 85610 PROTHROMBIN TIME: CPT

## 2019-06-04 PROCEDURE — 6360000002 HC RX W HCPCS: Performed by: STUDENT IN AN ORGANIZED HEALTH CARE EDUCATION/TRAINING PROGRAM

## 2019-06-04 PROCEDURE — 85025 COMPLETE CBC W/AUTO DIFF WBC: CPT

## 2019-06-04 RX ADMIN — OXYCODONE HYDROCHLORIDE AND ACETAMINOPHEN 2 TABLET: 5; 325 TABLET ORAL at 16:46

## 2019-06-04 RX ADMIN — HYOSCYAMINE SULFATE: 16 SOLUTION at 08:15

## 2019-06-04 RX ADMIN — PIPERACILLIN SODIUM,TAZOBACTAM SODIUM 3.38 G: 3; .375 INJECTION, POWDER, FOR SOLUTION INTRAVENOUS at 13:59

## 2019-06-04 RX ADMIN — INSULIN LISPRO 8 UNITS: 100 INJECTION, SOLUTION INTRAVENOUS; SUBCUTANEOUS at 08:10

## 2019-06-04 RX ADMIN — ENOXAPARIN SODIUM 80 MG: 80 INJECTION SUBCUTANEOUS at 08:05

## 2019-06-04 RX ADMIN — OXYCODONE HYDROCHLORIDE AND ACETAMINOPHEN 2 TABLET: 5; 325 TABLET ORAL at 03:54

## 2019-06-04 RX ADMIN — GABAPENTIN 800 MG: 400 CAPSULE ORAL at 13:01

## 2019-06-04 RX ADMIN — PIPERACILLIN SODIUM,TAZOBACTAM SODIUM 3.38 G: 3; .375 INJECTION, POWDER, FOR SOLUTION INTRAVENOUS at 21:55

## 2019-06-04 RX ADMIN — GABAPENTIN 800 MG: 400 CAPSULE ORAL at 08:04

## 2019-06-04 RX ADMIN — INSULIN LISPRO 6 UNITS: 100 INJECTION, SOLUTION INTRAVENOUS; SUBCUTANEOUS at 22:04

## 2019-06-04 RX ADMIN — ASPIRIN 325 MG: 325 TABLET, DELAYED RELEASE ORAL at 08:04

## 2019-06-04 RX ADMIN — INSULIN LISPRO 3 UNITS: 100 INJECTION, SOLUTION INTRAVENOUS; SUBCUTANEOUS at 08:05

## 2019-06-04 RX ADMIN — Medication 140 MG: at 08:15

## 2019-06-04 RX ADMIN — GABAPENTIN 800 MG: 400 CAPSULE ORAL at 16:41

## 2019-06-04 RX ADMIN — INSULIN LISPRO 3 UNITS: 100 INJECTION, SOLUTION INTRAVENOUS; SUBCUTANEOUS at 16:41

## 2019-06-04 RX ADMIN — PIPERACILLIN SODIUM,TAZOBACTAM SODIUM 3.38 G: 3; .375 INJECTION, POWDER, FOR SOLUTION INTRAVENOUS at 06:33

## 2019-06-04 RX ADMIN — OXYCODONE HYDROCHLORIDE AND ACETAMINOPHEN 2 TABLET: 5; 325 TABLET ORAL at 22:48

## 2019-06-04 RX ADMIN — ATORVASTATIN CALCIUM 20 MG: 20 TABLET, FILM COATED ORAL at 21:55

## 2019-06-04 RX ADMIN — INSULIN LISPRO 8 UNITS: 100 INJECTION, SOLUTION INTRAVENOUS; SUBCUTANEOUS at 16:43

## 2019-06-04 RX ADMIN — Medication 10 ML: at 08:16

## 2019-06-04 RX ADMIN — AMITRIPTYLINE HYDROCHLORIDE 100 MG: 50 TABLET, FILM COATED ORAL at 21:55

## 2019-06-04 RX ADMIN — PANTOPRAZOLE SODIUM 40 MG: 40 TABLET, DELAYED RELEASE ORAL at 06:33

## 2019-06-04 RX ADMIN — INSULIN GLARGINE 40 UNITS: 100 INJECTION, SOLUTION SUBCUTANEOUS at 22:04

## 2019-06-04 RX ADMIN — INSULIN GLARGINE 40 UNITS: 100 INJECTION, SOLUTION SUBCUTANEOUS at 08:05

## 2019-06-04 RX ADMIN — COLLAGENASE SANTYL: 250 OINTMENT TOPICAL at 08:16

## 2019-06-04 RX ADMIN — GABAPENTIN 800 MG: 400 CAPSULE ORAL at 21:54

## 2019-06-04 RX ADMIN — METOPROLOL SUCCINATE 50 MG: 50 TABLET, EXTENDED RELEASE ORAL at 21:54

## 2019-06-04 RX ADMIN — ENOXAPARIN SODIUM 80 MG: 80 INJECTION SUBCUTANEOUS at 22:08

## 2019-06-04 ASSESSMENT — PAIN DESCRIPTION - ORIENTATION
ORIENTATION: RIGHT;LEFT

## 2019-06-04 ASSESSMENT — PAIN DESCRIPTION - FREQUENCY
FREQUENCY: CONTINUOUS

## 2019-06-04 ASSESSMENT — PAIN - FUNCTIONAL ASSESSMENT
PAIN_FUNCTIONAL_ASSESSMENT: PREVENTS OR INTERFERES SOME ACTIVE ACTIVITIES AND ADLS

## 2019-06-04 ASSESSMENT — PAIN DESCRIPTION - ONSET
ONSET: ON-GOING

## 2019-06-04 ASSESSMENT — PAIN DESCRIPTION - DESCRIPTORS
DESCRIPTORS: ACHING

## 2019-06-04 ASSESSMENT — PAIN DESCRIPTION - PROGRESSION
CLINICAL_PROGRESSION: NOT CHANGED
CLINICAL_PROGRESSION: NOT CHANGED
CLINICAL_PROGRESSION: GRADUALLY WORSENING
CLINICAL_PROGRESSION: GRADUALLY WORSENING

## 2019-06-04 ASSESSMENT — PAIN DESCRIPTION - LOCATION
LOCATION: FOOT

## 2019-06-04 ASSESSMENT — PAIN DESCRIPTION - PAIN TYPE
TYPE: ACUTE PAIN

## 2019-06-04 ASSESSMENT — PAIN SCALES - GENERAL
PAINLEVEL_OUTOF10: 5
PAINLEVEL_OUTOF10: 5
PAINLEVEL_OUTOF10: 8
PAINLEVEL_OUTOF10: 7
PAINLEVEL_OUTOF10: 8
PAINLEVEL_OUTOF10: 0
PAINLEVEL_OUTOF10: 4
PAINLEVEL_OUTOF10: 5

## 2019-06-04 NOTE — PLAN OF CARE
Problem: Falls - Risk of:  Goal: Will remain free from falls  Outcome: Ongoing  Pt remains free from injury during this shift. Pt is up with stand by assist with a cane. Encourage pt to call for assistance when needed. Call light is in reach, bed is locked and in lowest position. Will continue to monitor and follow POC. Problem: Pain:  Goal: Pain level will decrease  Outcome: Ongoing  No complaints of pain at this time. Encourage pt to call when pain is noted. Will continue to monitor and follow POC.

## 2019-06-04 NOTE — PROGRESS NOTES
Patient alert and oriented, VSS. Dressings to bilateral feet are clean, dry and intact. Patient refusing bed alarms. Patient tolerating diet and voiding. Will continue to monitor.

## 2019-06-04 NOTE — PROGRESS NOTES
b/l.        DERMATOLOGIC: Wound noted to plantar aspect Right foot sub 4th metatarsal head. Wound measures  3.2 x 2.4 x 0.6 cm. Periwound maceration noted sub 4th metatarsal region, Right foot. Purulent and serous drainage noted. Wound does probe to bone. Wound does track to dorsum of Right foot and does undermine circumferentially approximately 1 cm in diameter. Malodor noted. Xerosis noted to the Right foot. Post debridement produced purulence. Wound noted to Left foot sub 1st metatarsal head, wound measures 1.8 x 1.8 x 0.2 cm. Wound base is fibrotic with periwound hyperkeratosis noted. No drainage noted. No malodor noted. Wound does not probe, track or undermine. Malodor noted. Wound noted to the lateral aspect of the Left 4th digit at the level of the PIPJ. Wound measures <0.5 cm in diameter. Wound bed is fibrotic. Wound does not probe, track or undermine.           MUSCULOSKELETAL: Muscle strength is 5/5 for all pedal groups tested. Pain with palpation to the plantar aspect of the Right foot at the level of the wound. 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 IMAGING:  R foot MRI done 6/1/19   Impression:   1. 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   2. Soft tissue ulceration of the lateral foot with subcutaneous emphysema. 3. 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 Given the proximity to this ulceration this is    suspicious for early involvement of osteomyelitis. 4. Moderate edema of the cuboid with transversely oriented T1 hypointense line suspicious for stress or insufficiency fracture. 5. Moderate edema of the posterior calcaneus with fracture line of the far posterior superior calcaneus.  The edema is favored to be related to primary fracture rather than representing osteomyelitis with a secondary pathologic fracture. 7. Slight edema without abnormal T1 signal of the third digit metatarsal head and proximal phalanx likely reactive. 8. Subcutaneous edema consistent with cellulitis. 9. Generalized edema of the musculature consistent with chronic small vessel ischemic changes some/denervation from diabetes. 10. Degenerative changes as above. ASSESSMENT/PLAN  1. Neuropathic ulceration sub 4th metatarsal head, Right foot 2/2 pressure-Carpenter grade III  2. Neuropathic ulceration 2/2 DM, sub 1st metatarsal head, Left foot-Carpenter grade I  3. Neuropathic ulcerated 2/2 pressure lateral aspect of 4th digit at the level of the PIPJ, Left foot-Carpenter grade I  4. Stress fx of Right cuboid   5. Primary fx of Right calcaneus   6. DM Type II uncontrolled with peripheral neuropathy  7. History of Non-compliance  8. Personal History of Nicotine and opioid Dependence    - VSS; Patient afebrile;  leukocytosis noted (WBC 13.6), ESR 97, CRP 22  -Xray Right foot shows changes consistent with osteo of 4th met head, and new fx at the neck. - MRI results noted above   -Wound culture from clinic obtained, Growing Enterobacter Cloacae, ID recs appreciated. - wounds dressed today with santyl to the L and dakins to the R , DSD, and ace, ordered post op shoe for R foot heel wb only B/L, post op will be non WB to R foot.   -Percocet pain panel started for pain management  -Hospitalist consulted for medical management and surgical clearance, poorly controlled DM with HgA1C of 12.6  -Social work consulted for discharge planning      Diet: carb control then NPO after midnight  DVT PPV: Lovenox given now, then held for  surgery tomorrow. [facility]  DISPO: MRI shows osteo of 4/5th mets in right foot with cuboid and calcaneal fx, discussed surgical options , to the OR for initial I&D with staged TMA  Right foot, wednesday afternoon. Discussed assessment and plan with Dr. Isak Monsivais.     Tigre Cameron DPM   Podiatric Resident, PGY-2  Pager: [phone]  6/4/2019, 10:17 AM

## 2019-06-04 NOTE — PLAN OF CARE
Problem: Falls - Risk of:  Goal: Will remain free from falls  Description  Will remain free from falls  6/4/2019 0856 by Ayla Leary RN  Outcome: Ongoing  Note:   Patient has remained free from falls. Bed is low and locked, side rails up 2/4, non skid socks on patient. Call light and bedside table are within reach. Patient calls out appropriately. Will continue to monitor. Problem: Pain:  Goal: Pain level will decrease  Description  Pain level will decrease  6/4/2019 0856 by Ayla Leary RN  Outcome: Ongoing  Note:   Patient denies pain at this time. Will continue to monitor.

## 2019-06-04 NOTE — PROGRESS NOTES
ID Follow-up NOTE  RESIDENT NOTE - reviewed / edited, attending note at bottom    CC:   Diabetic foot infection  Antibiotics: Zosyn    Admit Date: 5/31/2019  Hospital Day: 5    Subjective:     Patient is doing well today and denies any acute overnight complaints. Objective:     Patient Vitals for the past 8 hrs:   BP Temp Temp src Pulse Resp SpO2   06/04/19 0745 (!) 160/74 98.7 °F (37.1 °C) Oral 75 18 91 %   06/04/19 0411 (!) 163/85 98.9 °F (37.2 °C) Oral 89 18 97 %     I/O last 3 completed shifts: In: 5 [P.O.:420]  Out: -   No intake/output data recorded. EXAM:  GENERAL: No apparent distress. HEENT: Membranes moist, no oral lesion  NECK:  Supple  LUNGS: Clear b/l, no rales, no dullness  CARDIAC: RRR, no murmur appreciated  ABD:  + BS, soft / NT  EXT:  Plantar sub 1st met wound on left, sub 4th met wound on right   NEURO: No focal neurologic findings  PSYCH: Orientation, sensorium, mood normal  LINES:  Peripheral iv      Tracking with exposed 4th met head. Imaging:   Xray       Findings worrisome for osteomyelitis of the head of the fourth metacarpal.       Changes of the posterior calcaneus which may be postsurgical, infectious or posttraumatic. Please correlate clinically          MRI: RIGHT    Impression:   1. 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   2. Soft tissue ulceration of the lateral foot with subcutaneous emphysema. 3. 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 Given the proximity to this ulceration this is    suspicious for early involvement of osteomyelitis. 4. Moderate edema of the cuboid with transversely oriented T1 hypointense line suspicious for stress or insufficiency fracture.     5. Moderate edema of the posterior calcaneus with fracture line of the far posterior superior calcaneus. The edema is favored to be related to primary fracture rather than representing osteomyelitis with a secondary pathologic fracture. 7. Slight edema without abnormal T1 signal of the third digit metatarsal head and proximal phalanx likely reactive. 8. Subcutaneous edema consistent with cellulitis. 9. Generalized edema of the musculature consistent with chronic small vessel ischemic changes some/denervation from diabetes.     10. Degenerative changes as above.      Data Review:  Lab Results   Component Value Date    WBC 11.8 (H) 06/04/2019    HGB 10.6 (L) 06/04/2019    HCT 31.9 (L) 06/04/2019    MCV 86.6 06/04/2019     (H) 06/04/2019     Lab Results   Component Value Date    CREATININE 1.6 (H) 06/04/2019    BUN 34 (H) 06/04/2019     06/04/2019    K 4.8 06/04/2019     06/04/2019    CO2 26 06/04/2019     Hepatic Function Panel:   Lab Results   Component Value Date    ALKPHOS 211 01/04/2019    ALT 23 01/04/2019    AST 26 01/04/2019    PROT 6.8 01/04/2019    PROT 6.6 07/21/2011    BILITOT <0.2 01/04/2019    BILIDIR <0.2 03/27/2018    IBILI see below 03/27/2018    LABALBU 3.5 06/03/2019       Scheduled Meds:   piperacillin-tazobactam  3.375 g Intravenous Q8H    insulin lispro  0-18 Units Subcutaneous TID WC    insulin lispro  0-9 Units Subcutaneous Nightly    sodium hypochlorite   Irrigation Daily    lidocaine 1 % injection  5 mL Intradermal Once    sodium chloride flush  10 mL Intravenous 2 times per day    enoxaparin  80 mg Subcutaneous BID    amitriptyline  100 mg Oral Nightly    aspirin  325 mg Oral Daily    atorvastatin  20 mg Oral Nightly    [Held by provider] furosemide  40 mg Oral BID    gabapentin  800 mg Oral 4x Daily    methadone  140 mg Oral Daily    metoprolol succinate  50 mg Oral Nightly    pantoprazole  40 mg Oral QAM AC    collagenase   Topical Daily    insulin glargine  40 Units Subcutaneous BID    insulin lispro  8 Units Subcutaneous TID WC    insulin lispro  8 Units Subcutaneous Once       Continuous Infusions:   dextrose         PRN Meds:  sodium chloride flush, albuterol, naloxone, diphenhydrAMINE, acetaminophen, docusate sodium, ondansetron, promethazine, glucose, dextrose, glucagon (rDNA), dextrose, oxyCODONE-acetaminophen **OR** oxyCODONE-acetaminophen      Assessment:      44 yo woman with hx obesity, DM, neuropathy with Diabetic foot infection and osteomyelitis to right foot    L partial hallux amp 2/2017 with MRSA and E faecalis  R partial 5th ray resection 8/17/18 with E cloacae, E coli   R DFI 1/2019, cult + E faecalis, Ps aeruginosa, C albicans   R DFI 3/2019, cult + GBS    Admitted from [facility] 5/31 for exposure of 4th met head. Plan:     Continue Zosyn  Surgical debridement with TMA per podiatry for 6/5. Will follow up on surgical cultures for antibiotic plan. Discussed with Dr. Rigo Daly DPM   PGY-2, Pager #: [phone]    Addendum to Resident Progress note:  Pt seen, examined and evaluated. I have reviewed the current history, physical findings, labs and assessment and plan and agree with note as documented by resident (Dr. Charleen Sorto).    44 yo woman with hx obesity, DM, neuropathy. Hx L partial hallux amp 2/2017 (cult + MRSA, E faecalis)  Hx R partial 5th ray resection 8/17/18, cult E cloacae, Ecoli  Hx R foot wound infection 1/2019, cult + E faecalis, Ps aeruginosa, C albicans  Hx R foot wound infection 3/2019, cult + GBS     Admit 5/31 - seen at [facility] and referred to [facility] for admission. R foot wound with exposed 4th MT head. Wound worse over time with bloody drain and pain per pt  Afebile, WBC 9.4, cult light growth E cloacae.   Started on vancomycin + zosyn  MRI with 4th MT and 4th prox phalanx destruction     IMP/  R DM foot wound with extension osteomyelitis   - has had GNR, E faecalis, GBS in past   - cult now with E cloacae   - MRI with 4th MT / prox phalanx osteomyelitis   - will need surgical debridement.   TMA recommended by Podiatry     REC/  Cont zosyn  Surgical debridement per Podiatry - TMA scheduled for 6/5     Discharge antibiotic plan depends on final culture results, surgery (if definitive)  Discussed with pt  Chance Monsalve MD

## 2019-06-04 NOTE — PROGRESS NOTES
Pt alert and oriented. VSS with the exception of elevated B/P. Pt slept most of night. Ace wrap C, D, & I with surgical shoes to bilateral feet. Pt has right upper PICC with good blood return, getting intermittent infusions.

## 2019-06-04 NOTE — PROGRESS NOTES
Resident Progress Note    Admit Date: 2019    PCP: Eric Ventura MD                  : 1965  MRN: [id number]  CC: right foot pain    Subjective: Interval History:   Patient was seen today somnolent difficult to arouse but once awake patient was alert and oriented to person place and time. Patient otherwise has no complaints. Diet: Dietary Nutrition Supplements: Wound Healing Oral Supplement  DIET CARB CONTROL; Diet NPO, After Midnight      Data:   Scheduled Meds:   piperacillin-tazobactam  3.375 g Intravenous Q8H    insulin lispro  0-18 Units Subcutaneous TID WC    insulin lispro  0-9 Units Subcutaneous Nightly    sodium hypochlorite   Irrigation Daily    lidocaine 1 % injection  5 mL Intradermal Once    sodium chloride flush  10 mL Intravenous 2 times per day    enoxaparin  80 mg Subcutaneous BID    amitriptyline  100 mg Oral Nightly    aspirin  325 mg Oral Daily    atorvastatin  20 mg Oral Nightly    [Held by provider] furosemide  40 mg Oral BID    gabapentin  800 mg Oral 4x Daily    methadone  140 mg Oral Daily    metoprolol succinate  50 mg Oral Nightly    pantoprazole  40 mg Oral QAM AC    collagenase   Topical Daily    insulin glargine  40 Units Subcutaneous BID    insulin lispro  8 Units Subcutaneous TID WC    insulin lispro  8 Units Subcutaneous Once     Continuous Infusions:   dextrose       PRN Meds:sodium chloride flush, albuterol, naloxone, diphenhydrAMINE, acetaminophen, docusate sodium, ondansetron, promethazine, glucose, dextrose, glucagon (rDNA), dextrose, oxyCODONE-acetaminophen **OR** oxyCODONE-acetaminophen  I/O last 3 completed shifts: In: 5 [P.O.:420]  Out: -   No intake/output data recorded.     Intake/Output Summary (Last 24 hours) at 2019 1340  Last data filed at 2019 0355  Gross per 24 hour   Intake 420 ml   Output --   Net 420 ml           Objective:     Vitals: /60   Pulse 79   Temp 99 °F (37.2 °C) (Oral)   Resp 16   Ht 5' 2\" (1.575 m)   Wt 190 lb (86.2 kg)   SpO2 94%   BMI 34.75 kg/m²     Physical Exam   Constitutional: She is oriented to person, place, and time. She appears well-developed and well-nourished. HENT:   Head: Normocephalic and atraumatic. Eyes: EOM are normal.   Neck: Normal range of motion. Neck supple. No JVD present. Cardiovascular: Normal rate and regular rhythm. Exam reveals no friction rub. No murmur heard. Pulmonary/Chest: Effort normal. She has rales (Bilaterally). Abdominal: Soft. Bowel sounds are normal. She exhibits no distension and no mass. There is no tenderness. There is no guarding. Musculoskeletal: Normal range of motion. She exhibits no edema or deformity. Neurological: She is alert and oriented to person, place, and time. Skin: Skin is warm and dry. Capillary refill takes less than 2 seconds. Psychiatric: She has a normal mood and affect. LABS:    CBC:   Recent Labs     06/02/19  0528 06/03/19  0629 06/04/19  0420   WBC 9.5 13.6* 11.8*   HGB 11.1* 11.0* 10.6*   HCT 33.9* 33.8* 31.9*   MCV 86.6 88.0 86.6   * 432 453*                                                                BMP:    Recent Labs     06/03/19  0634 06/03/19  1800 06/04/19  0420    138 137   K 5.5* 5.1 4.8    101 101   CO2 25 26 26   BUN 36* 35* 34*   CREATININE 1.5* 1.6* 1.6*   GLUCOSE 109* 114* 147*       LFT's: No results for input(s): AST, ALT, ALB, BILITOT, ALKPHOS in the last 72 hours. Troponin: No results for input(s): TROPONINI in the last 72 hours. BNP: No results for input(s): BNP in the last 72 hours. Lipids: No results for input(s): CHOL, HDL in the last 72 hours.     Invalid input(s): LDLCALCU    ABGs:   Lab Results   Component Value Date    PHART 7.48 07/19/2011    QIB7BCF 49 07/19/2011    PO2ART 59 07/19/2011       INR:   Recent Labs     06/02/19  0528 06/03/19  0628 06/04/19  0420   INR 1.04 0.93 0.98       U/A:No results for input(s): NITRITE, COLORU, PHUR, a direct admit from podiatry clinic with concerns for Osteomyelitis.  Internal medicine consulted for medical management of co morbidities.      Osteomyelitis 4 metatarsal head  - On Zosyn per Podiatry  - ID consulted per podiatry patient to continue on zosyn  - MRI Positive for Osteomyelitis   - awaiting I&D   - will discuss with Podiatry when we can restart anticoagulation     DM2  - Patient restarted on home Lantus 40 units BID  - Premeal insulin 8 units  - Low dose sliding scale  - Hypo glycemic protocol  - Currently well controlled     Hyperkalemia likely 2/2 RTA4  - elevated to 5.5 today  - will check repeat Potassium      History of DVT  - On Therapeutic lovenox in light of pending surgery  - holding pm dose for procedure tomorrow     CHFpEF compensated  - On toprol XL 50 mg  - continue Lasix 40 mg BID  - Nuclear stress test performed in 8-16/2018 negative for ischemia and EF 63%     Code Status: Full  FEN: NPO after midnight  PPx: Lovenox held for surgery tomorrow  Dispo: [facility]     Plan discussed with Dr. Nikolai Monroy MD  6/4/2019  1:40 PM

## 2019-06-05 ENCOUNTER — ANESTHESIA EVENT (OUTPATIENT)
Dept: OPERATING ROOM | Age: 54
DRG: 617 | End: 2019-06-05
Payer: COMMERCIAL

## 2019-06-05 LAB
ANION GAP SERPL CALCULATED.3IONS-SCNC: 11 MMOL/L (ref 3–16)
BASOPHILS ABSOLUTE: 0.1 K/UL (ref 0–0.2)
BASOPHILS RELATIVE PERCENT: 0.6 %
BUN BLDV-MCNC: 33 MG/DL (ref 7–20)
CALCIUM SERPL-MCNC: 9.1 MG/DL (ref 8.3–10.6)
CHLORIDE BLD-SCNC: 100 MMOL/L (ref 99–110)
CO2: 25 MMOL/L (ref 21–32)
CREAT SERPL-MCNC: 1.4 MG/DL (ref 0.6–1.1)
EOSINOPHILS ABSOLUTE: 0.2 K/UL (ref 0–0.6)
EOSINOPHILS RELATIVE PERCENT: 1.5 %
GFR AFRICAN AMERICAN: 47
GFR NON-AFRICAN AMERICAN: 39
GLUCOSE BLD-MCNC: 127 MG/DL (ref 70–99)
GLUCOSE BLD-MCNC: 174 MG/DL (ref 70–99)
GLUCOSE BLD-MCNC: 181 MG/DL (ref 70–99)
GLUCOSE BLD-MCNC: 205 MG/DL (ref 70–99)
GLUCOSE BLD-MCNC: 71 MG/DL (ref 70–99)
HCT VFR BLD CALC: 30.4 % (ref 36–48)
HEMOGLOBIN: 9.9 G/DL (ref 12–16)
LYMPHOCYTES ABSOLUTE: 2.4 K/UL (ref 1–5.1)
LYMPHOCYTES RELATIVE PERCENT: 21.6 %
MCH RBC QN AUTO: 28.7 PG (ref 26–34)
MCHC RBC AUTO-ENTMCNC: 32.6 G/DL (ref 31–36)
MCV RBC AUTO: 87.9 FL (ref 80–100)
MONOCYTES ABSOLUTE: 0.8 K/UL (ref 0–1.3)
MONOCYTES RELATIVE PERCENT: 7.6 %
NEUTROPHILS ABSOLUTE: 7.5 K/UL (ref 1.7–7.7)
NEUTROPHILS RELATIVE PERCENT: 68.7 %
PDW BLD-RTO: 15.7 % (ref 12.4–15.4)
PERFORMED ON: ABNORMAL
PERFORMED ON: NORMAL
PLATELET # BLD: 398 K/UL (ref 135–450)
PMV BLD AUTO: 7.8 FL (ref 5–10.5)
POTASSIUM SERPL-SCNC: 4.8 MMOL/L (ref 3.5–5.1)
RBC # BLD: 3.46 M/UL (ref 4–5.2)
SEDIMENTATION RATE, ERYTHROCYTE: 113 MM/HR (ref 0–30)
SODIUM BLD-SCNC: 136 MMOL/L (ref 136–145)
WBC # BLD: 10.9 K/UL (ref 4–11)

## 2019-06-05 PROCEDURE — 6360000002 HC RX W HCPCS: Performed by: STUDENT IN AN ORGANIZED HEALTH CARE EDUCATION/TRAINING PROGRAM

## 2019-06-05 PROCEDURE — 80048 BASIC METABOLIC PNL TOTAL CA: CPT

## 2019-06-05 PROCEDURE — 85652 RBC SED RATE AUTOMATED: CPT

## 2019-06-05 PROCEDURE — 99232 SBSQ HOSP IP/OBS MODERATE 35: CPT | Performed by: INTERNAL MEDICINE

## 2019-06-05 PROCEDURE — 2580000003 HC RX 258: Performed by: STUDENT IN AN ORGANIZED HEALTH CARE EDUCATION/TRAINING PROGRAM

## 2019-06-05 PROCEDURE — 6370000000 HC RX 637 (ALT 250 FOR IP): Performed by: STUDENT IN AN ORGANIZED HEALTH CARE EDUCATION/TRAINING PROGRAM

## 2019-06-05 PROCEDURE — 85025 COMPLETE CBC W/AUTO DIFF WBC: CPT

## 2019-06-05 PROCEDURE — 1200000000 HC SEMI PRIVATE

## 2019-06-05 RX ORDER — OXYCODONE HYDROCHLORIDE AND ACETAMINOPHEN 5; 325 MG/1; MG/1
1 TABLET ORAL ONCE
Status: COMPLETED | OUTPATIENT
Start: 2019-06-05 | End: 2019-06-05

## 2019-06-05 RX ADMIN — INSULIN GLARGINE 40 UNITS: 100 INJECTION, SOLUTION SUBCUTANEOUS at 21:36

## 2019-06-05 RX ADMIN — GABAPENTIN 800 MG: 400 CAPSULE ORAL at 09:19

## 2019-06-05 RX ADMIN — ASPIRIN 325 MG: 325 TABLET, DELAYED RELEASE ORAL at 08:44

## 2019-06-05 RX ADMIN — ENOXAPARIN SODIUM 80 MG: 80 INJECTION SUBCUTANEOUS at 08:44

## 2019-06-05 RX ADMIN — ATORVASTATIN CALCIUM 20 MG: 20 TABLET, FILM COATED ORAL at 21:33

## 2019-06-05 RX ADMIN — INSULIN LISPRO 8 UNITS: 100 INJECTION, SOLUTION INTRAVENOUS; SUBCUTANEOUS at 11:32

## 2019-06-05 RX ADMIN — PIPERACILLIN SODIUM,TAZOBACTAM SODIUM 3.38 G: 3; .375 INJECTION, POWDER, FOR SOLUTION INTRAVENOUS at 21:43

## 2019-06-05 RX ADMIN — PIPERACILLIN SODIUM,TAZOBACTAM SODIUM 3.38 G: 3; .375 INJECTION, POWDER, FOR SOLUTION INTRAVENOUS at 14:41

## 2019-06-05 RX ADMIN — GABAPENTIN 800 MG: 400 CAPSULE ORAL at 14:43

## 2019-06-05 RX ADMIN — AMITRIPTYLINE HYDROCHLORIDE 100 MG: 50 TABLET, FILM COATED ORAL at 21:32

## 2019-06-05 RX ADMIN — INSULIN LISPRO 6 UNITS: 100 INJECTION, SOLUTION INTRAVENOUS; SUBCUTANEOUS at 11:30

## 2019-06-05 RX ADMIN — Medication 10 ML: at 08:50

## 2019-06-05 RX ADMIN — GABAPENTIN 800 MG: 400 CAPSULE ORAL at 21:33

## 2019-06-05 RX ADMIN — METOPROLOL SUCCINATE 50 MG: 50 TABLET, EXTENDED RELEASE ORAL at 21:33

## 2019-06-05 RX ADMIN — PANTOPRAZOLE SODIUM 40 MG: 40 TABLET, DELAYED RELEASE ORAL at 07:00

## 2019-06-05 RX ADMIN — INSULIN LISPRO 3 UNITS: 100 INJECTION, SOLUTION INTRAVENOUS; SUBCUTANEOUS at 08:33

## 2019-06-05 RX ADMIN — PIPERACILLIN SODIUM,TAZOBACTAM SODIUM 3.38 G: 3; .375 INJECTION, POWDER, FOR SOLUTION INTRAVENOUS at 07:00

## 2019-06-05 RX ADMIN — INSULIN LISPRO 8 UNITS: 100 INJECTION, SOLUTION INTRAVENOUS; SUBCUTANEOUS at 08:41

## 2019-06-05 RX ADMIN — Medication 10 ML: at 21:43

## 2019-06-05 RX ADMIN — Medication 140 MG: at 09:17

## 2019-06-05 RX ADMIN — ENOXAPARIN SODIUM 80 MG: 80 INJECTION SUBCUTANEOUS at 22:09

## 2019-06-05 RX ADMIN — INSULIN GLARGINE 40 UNITS: 100 INJECTION, SOLUTION SUBCUTANEOUS at 08:34

## 2019-06-05 RX ADMIN — OXYCODONE HYDROCHLORIDE AND ACETAMINOPHEN 1 TABLET: 5; 325 TABLET ORAL at 10:59

## 2019-06-05 RX ADMIN — GABAPENTIN 800 MG: 400 CAPSULE ORAL at 17:35

## 2019-06-05 ASSESSMENT — PAIN DESCRIPTION - LOCATION
LOCATION: FOOT

## 2019-06-05 ASSESSMENT — PAIN SCALES - GENERAL
PAINLEVEL_OUTOF10: 8
PAINLEVEL_OUTOF10: 8
PAINLEVEL_OUTOF10: 3
PAINLEVEL_OUTOF10: 4
PAINLEVEL_OUTOF10: 8
PAINLEVEL_OUTOF10: 3
PAINLEVEL_OUTOF10: 0
PAINLEVEL_OUTOF10: 8
PAINLEVEL_OUTOF10: 7

## 2019-06-05 ASSESSMENT — PAIN - FUNCTIONAL ASSESSMENT
PAIN_FUNCTIONAL_ASSESSMENT: PREVENTS OR INTERFERES SOME ACTIVE ACTIVITIES AND ADLS

## 2019-06-05 ASSESSMENT — PAIN DESCRIPTION - PROGRESSION
CLINICAL_PROGRESSION: NOT CHANGED

## 2019-06-05 ASSESSMENT — PAIN DESCRIPTION - FREQUENCY
FREQUENCY: CONTINUOUS

## 2019-06-05 ASSESSMENT — PAIN DESCRIPTION - DESCRIPTORS
DESCRIPTORS: ACHING

## 2019-06-05 ASSESSMENT — PAIN DESCRIPTION - ORIENTATION
ORIENTATION: RIGHT;LEFT

## 2019-06-05 ASSESSMENT — PAIN DESCRIPTION - ONSET
ONSET: ON-GOING

## 2019-06-05 ASSESSMENT — PAIN DESCRIPTION - PAIN TYPE
TYPE: ACUTE PAIN

## 2019-06-05 ASSESSMENT — LIFESTYLE VARIABLES: SMOKING_STATUS: 0

## 2019-06-05 NOTE — PROGRESS NOTES
ID Follow-up NOTE  RESIDENT NOTE - reviewed / edited, attending note at bottom    CC:   Diabetic foot infection with osteomyelitis to the right foot  Antibiotics:  zosyn    Admit Date: 5/31/2019  Hospital Day: 6    Subjective:     Patient seems high on narcotics at this time. She could not finish her full sentences without dozing off. Patient is comfortable and denies any acute pain. Patient denies f/c/n/v/sob/cp. Objective:     Patient Vitals for the past 8 hrs:   BP Temp Temp src Pulse Resp SpO2   06/05/19 0738 (!) 153/76 98.5 °F (36.9 °C) Oral 80 18 90 %   06/05/19 0410 (!) 146/74 98.3 °F (36.8 °C) Oral 74 18 (!) 89 %     I/O last 3 completed shifts: In: 1320 [P.O.:1320]  Out: -   No intake/output data recorded. EXAM:  GENERAL:      No apparent distress. HEENT:           Membranes moist, no oral lesion  NECK:             Supple  LUNGS:           Clear b/l, no rales, no dullness  CARDIAC:       RRR, no murmur appreciated  ABD:                + BS, soft / NT  EXT:                Plantar sub 1st met wound on left, sub 4th met wound on right   NEURO:          No focal neurologic findings  PSYCH:           Orientation, sensorium, mood normal  LINES:             Peripheral iv       Tracking with exposed 4th met head.          Data Review:  Lab Results   Component Value Date    WBC 10.9 06/05/2019    HGB 9.9 (L) 06/05/2019    HCT 30.4 (L) 06/05/2019    MCV 87.9 06/05/2019     06/05/2019     Lab Results   Component Value Date    CREATININE 1.4 (H) 06/05/2019    BUN 33 (H) 06/05/2019     06/05/2019    K 4.8 06/05/2019     06/05/2019    CO2 25 06/05/2019       Hepatic Function Panel:   Lab Results   Component Value Date    ALKPHOS 211 01/04/2019    ALT 23 01/04/2019    AST 26 01/04/2019    PROT 6.8 01/04/2019    PROT 6.6 07/21/2011    BILITOT <0.2 01/04/2019    BILIDIR <0.2 03/27/2018    IBILI see below 03/27/2018    LABALBU 3.5 06/03/2019       MICRO:  5/31 Wound cult: light growth E cloacae  Enterobacter cloacae   Antibiotic Interpretation ISAAC Status    amoxicillin-clavulanate Resistant >16/8 mcg/mL     ampicillin Resistant >16 mcg/mL     ceFAZolin Resistant >16 mcg/mL     cefepime Sensitive <=2 mcg/mL     cefotaxime Sensitive <=2 mcg/mL     cefTRIAXone Sensitive <=1 mcg/mL     cefuroxime Resistant <=4 mcg/mL     ciprofloxacin Sensitive <=1 mcg/mL     gentamicin Sensitive <=4 mcg/mL     meropenem Sensitive <=1 mcg/mL     piperacillin-tazobactam Sensitive <=16 mcg/mL     trimethoprim-sulfamethoxazole Sensitive <=2/38 mcg/mL         Imaging:   Xray   Findings worrisome for osteomyelitis of the head of the fourth metacarpal.       Changes of the posterior calcaneus which may be postsurgical, infectious or posttraumatic. Please correlate clinically          MRI: RIGHT    Impression:   1. 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   2. Soft tissue ulceration of the lateral foot with subcutaneous emphysema. 3. 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 Given the proximity to this ulceration this is    suspicious for early involvement of osteomyelitis. 4. Moderate edema of the cuboid with transversely oriented T1 hypointense line suspicious for stress or insufficiency fracture. 5. Moderate edema of the posterior calcaneus with fracture line of the far posterior superior calcaneus. The edema is favored to be related to primary fracture rather than representing osteomyelitis with a secondary pathologic fracture. 7. Slight edema without abnormal T1 signal of the third digit metatarsal head and proximal phalanx likely reactive. 8. Subcutaneous edema consistent with cellulitis.     9. Generalized edema of the musculature consistent with chronic small vessel ischemic changes some/denervation from diabetes. 10. Degenerative changes as above.        Scheduled Meds:   piperacillin-tazobactam  3.375 g Intravenous Q8H    insulin lispro  0-18 Units Subcutaneous TID WC    insulin lispro  0-9 Units Subcutaneous Nightly    sodium hypochlorite   Irrigation Daily    lidocaine 1 % injection  5 mL Intradermal Once    sodium chloride flush  10 mL Intravenous 2 times per day    enoxaparin  80 mg Subcutaneous BID    amitriptyline  100 mg Oral Nightly    aspirin  325 mg Oral Daily    atorvastatin  20 mg Oral Nightly    [Held by provider] furosemide  40 mg Oral BID    gabapentin  800 mg Oral 4x Daily    methadone  140 mg Oral Daily    metoprolol succinate  50 mg Oral Nightly    pantoprazole  40 mg Oral QAM AC    collagenase   Topical Daily    insulin glargine  40 Units Subcutaneous BID    insulin lispro  8 Units Subcutaneous TID WC    insulin lispro  8 Units Subcutaneous Once       Continuous Infusions:   dextrose         PRN Meds:  sodium chloride flush, albuterol, naloxone, acetaminophen, docusate sodium, ondansetron, promethazine, glucose, dextrose, glucagon (rDNA), dextrose      Assessment:      46 yo woman with hx obesity, DM, neuropathy with Diabetic foot infection and osteomyelitis to right foot  -Wound culture: Enterobacter Cloacae;VSS;   -MRI 4th ray osteomyelitis     Plan:     -Continue Zosyn  -I&D with TMA per Podiatry tomorrow. -Abx recommendation upon discharge pending on final surgical cultures. Discussed with Dr. Javid Catalan DPM   PGY-2, Pager #: [phone]    Addendum to Resident Progress note:  Pt seen, examined and evaluated. I have reviewed the current history, physical findings, labs and assessment and plan and agree with note as documented by resident (Ellen Mckeon).    46 yo woman with hx obesity, DM, neuropathy.   Hx L partial hallux amp 2/2017 (cult + MRSA, E faecalis)  Hx R partial 5th ray resection 8/17/18, cult E cloacae, Ecoli  Hx R foot wound infection 1/2019, cult + E faecalis, Ps aeruginosa, C albicans  Hx R foot wound infection 3/2019, cult + GBS     Admit 5/31 - seen at [facility] and referred to [facility] for admission.   R foot wound with exposed 4th MT head.  Wound worse over time with bloody drain and pain per pt  Afebile, WBC 9.4, cult light growth E cloacae.  Started on vancomycin + zosyn  MRI with 4th MT and 4th prox phalanx destruction     IMP/  R DM foot wound with extension osteomyelitis   - has had GNR, E faecalis, GBS in past   - cult now with E cloacae   - MRI with 4th MT / prox phalanx osteomyelitis   - will need surgical debridement.  TMA recommended by Podiatry     REC/  Cont zosyn  Surgical debridement per Podiatry - TMA scheduled for tomorrow, 6/5     Discharge antibiotic plan depends on final culture results, surgery (if definitive)  Discussed with pt  Flores Lake MD

## 2019-06-05 NOTE — PLAN OF CARE
Problem: Falls - Risk of:  Goal: Will remain free from falls  Outcome: Ongoing  Pt remains free from injury during this shift. Pt is up with stand by assist using a cane. Encourage pt to call when needing to get out of bed. Call light is in reach, bed is locked and in lowest position. Will continue to monitor and follow POC. Problem: Pain:  Goal: Pain level will decrease  Outcome: Ongoing  Medicated pt per orders, please see e-Mar. Encourage pt to if pain increases. Will continue to monitor and follow POC.

## 2019-06-05 NOTE — PROGRESS NOTES
Resident Progress Note    Admit Date: 2019    PCP: Camilla Wallis MD                  : 1965  MRN: [id number]  CC: right foot pain    Subjective: Interval History:     Patient seen this morning no events overnight. Patient is without complaints of chest pain or SOB. Diet: Dietary Nutrition Supplements: Wound Healing Oral Supplement  DIET CARB CONTROL; Diet NPO, After Midnight      Data:   Scheduled Meds:   piperacillin-tazobactam  3.375 g Intravenous Q8H    insulin lispro  0-18 Units Subcutaneous TID WC    insulin lispro  0-9 Units Subcutaneous Nightly    sodium hypochlorite   Irrigation Daily    lidocaine 1 % injection  5 mL Intradermal Once    sodium chloride flush  10 mL Intravenous 2 times per day    enoxaparin  80 mg Subcutaneous BID    amitriptyline  100 mg Oral Nightly    aspirin  325 mg Oral Daily    atorvastatin  20 mg Oral Nightly    [Held by provider] furosemide  40 mg Oral BID    gabapentin  800 mg Oral 4x Daily    methadone  140 mg Oral Daily    metoprolol succinate  50 mg Oral Nightly    pantoprazole  40 mg Oral QAM AC    collagenase   Topical Daily    insulin glargine  40 Units Subcutaneous BID    insulin lispro  8 Units Subcutaneous TID WC    insulin lispro  8 Units Subcutaneous Once     Continuous Infusions:   dextrose       PRN Meds:sodium chloride flush, albuterol, naloxone, acetaminophen, docusate sodium, ondansetron, promethazine, glucose, dextrose, glucagon (rDNA), dextrose  I/O last 3 completed shifts: In: 1320 [P.O.:1320]  Out: -   No intake/output data recorded.     Intake/Output Summary (Last 24 hours) at 2019 1009  Last data filed at 2019 2340  Gross per 24 hour   Intake 1320 ml   Output --   Net 1320 ml           Objective:     Vitals: BP (!) 153/76   Pulse 80   Temp 98.5 °F (36.9 °C) (Oral)   Resp 18   Ht 5' 2\" (1.575 m)   Wt 190 lb (86.2 kg)   SpO2 90%   BMI 34.75 kg/m²     Physical Exam   Constitutional: She is oriented to person, place, and time. She appears well-developed and well-nourished. HENT:   Head: Normocephalic and atraumatic. Eyes: Pupils are equal, round, and reactive to light. EOM are normal.   Neck: Normal range of motion. Neck supple. No JVD present. Cardiovascular: Normal rate, regular rhythm and normal heart sounds. Exam reveals no friction rub. No murmur heard. Pulmonary/Chest: Effort normal and breath sounds normal. No stridor. No respiratory distress. She has no wheezes. She has no rales. Abdominal: Soft. Bowel sounds are normal. She exhibits no distension and no mass. There is no tenderness. There is no guarding. Musculoskeletal: Normal range of motion. She exhibits tenderness (right foot). She exhibits no edema. Foot ulcers bilateral   Neurological: She is alert and oriented to person, place, and time. No cranial nerve deficit. Skin: Skin is warm and dry. Capillary refill takes less than 2 seconds. No erythema. Psychiatric: She has a normal mood and affect. LABS:    CBC:   Recent Labs     06/03/19  0629 06/04/19  0420 06/05/19  0705   WBC 13.6* 11.8* 10.9   HGB 11.0* 10.6* 9.9*   HCT 33.8* 31.9* 30.4*   MCV 88.0 86.6 87.9    453* 398                                                                BMP:    Recent Labs     06/03/19  1800 06/04/19  0420 06/05/19  0705    137 136   K 5.1 4.8 4.8    101 100   CO2 26 26 25   BUN 35* 34* 33*   CREATININE 1.6* 1.6* 1.4*   GLUCOSE 114* 147* 174*       LFT's: No results for input(s): AST, ALT, ALB, BILITOT, ALKPHOS in the last 72 hours. Troponin: No results for input(s): TROPONINI in the last 72 hours. BNP: No results for input(s): BNP in the last 72 hours. Lipids: No results for input(s): CHOL, HDL in the last 72 hours.     Invalid input(s): LDLCALCU    ABGs:   Lab Results   Component Value Date    PHART 7.48 07/19/2011    ROE7XNG 49 07/19/2011    PO2ART 59 07/19/2011       INR:   Recent Labs     06/03/19  6130 06/04/19  0420   INR 0.93 0.98       U/A:No results for input(s): NITRITE, COLORU, PHUR, LABCAST, WBCUA, RBCUA, MUCUS, TRICHOMONAS, YEAST, BACTERIA, CLARITYU, SPECGRAV, LEUKOCYTESUR, UROBILINOGEN, BILIRUBINUR, BLOODU, GLUCOSEU, AMORPHOUS in the last 72 hours. Invalid input(s): [place]en [place]   -----------------------------------------------------------------  RAD:   MRI FOOT RIGHT WO CONTRAST   Final Result   Impression:   1. 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   2. Soft tissue ulceration of the lateral foot with subcutaneous emphysema. 3. 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 Given the proximity to this ulceration this is    suspicious for early involvement of osteomyelitis. 4. Moderate edema of the cuboid with transversely oriented T1 hypointense line suspicious for stress or insufficiency fracture. 5. Moderate edema of the posterior calcaneus with fracture line of the far posterior superior calcaneus. The edema is favored to be related to primary fracture rather than representing osteomyelitis with a secondary pathologic fracture. 7. Slight edema without abnormal T1 signal of the third digit metatarsal head and proximal phalanx likely reactive. 8. Subcutaneous edema consistent with cellulitis. 9. Generalized edema of the musculature consistent with chronic small vessel ischemic changes some/denervation from diabetes. 10. Degenerative changes as above. XR FOOT RIGHT (MIN 3 VIEWS)   Final Result      Findings worrisome for osteomyelitis of the head of the fourth metacarpal.      Changes of the posterior calcaneus which may be postsurgical, infectious or posttraumatic.  Please correlate clinically            Assessment/Plan:   Patient is 51 y/o female with PMHx of Diabetes, diabetic foot ulcer, HLD presenting to [facility] as a direct admit from podiatry clinic with concerns for Osteomyelitis.  Internal medicine consulted for medical management of co morbidities.      Osteomyelitis 4 metatarsal head  - On Zosyn per Podiatry  - ID consulted per podiatry patient to continue on zosyn  - MRI Positive for Osteomyelitis   -  awaiting surgery which has been pushed back by podiatry to tomorrow     DM2  - Patient restarted on home Lantus 40 units BID  - Premeal insulin 8 units  - Low dose sliding scale  - Hypo glycemic protocol  - Currently well controlled     Hyperkalemia likely 2/2 RTA4 resolved  - Normal K    History of DVT  - On Therapeutic lovenox in light of pending surgery  - holding pm dose for procedure tomorrow     CHFpEF compensated  - On toprol XL 50 mg  - continue Lasix 40 mg BID  - Nuclear stress test performed in 8-16/2018 negative for ischemia and EF 63%     Code Status: Full  FEN: NPO after midnight  PPx: Lovenox held for surgery tomorrow  Dispo: GMF     Plan discussed with Dr. Fabien Amador MD  6/5/2019  10:09 AM

## 2019-06-05 NOTE — PROGRESS NOTES
Pt is alert and oriented. VSS with the exception of an elevated B/P. No complaints of dizziness or headache. Pt currently NPO for preporation for surgery. Call light and all needs are within reach. Medicated pt per orders. Will continue to monitor and for safety and comfort.

## 2019-06-05 NOTE — PLAN OF CARE
Problem: Pain:  Goal: Pain level will decrease  Description  Pain level will decrease  Pt c/o bilateral foot pain unrelieved with scheduled Methadone, Dr. Manan Ovalles notified and is aware. 1 tab Percocet given per order. On reassessment pt satisfied with pain control. Will continue to monitor.   6/5/2019 1408 by Jesus Marcial RN  Outcome: Ongoing

## 2019-06-05 NOTE — PROGRESS NOTES
Pt is alert and oriented x 4. Vitals are stable. Pt is tolerating carb control diet, denies nausea. Pt will be NPO at midnight for scheduled surgery tomorrow, pt verbalized understanding. Consent is signed and in chart. Dressings to bilateral feet are CDI, dressings changes per podiatry. Will continue to monitor.

## 2019-06-05 NOTE — PROGRESS NOTES
Podiatric Surgery Daily Progress Note  Jeffrey Ramirez  Subjective :   Patient seen and examined this am at the bedside. Discussed with patient at length the importance of getting her blood sugar under control and being compliant with WB status. Patient denies any acute overnight events. Patient denies N/V/F/C/SOB. Patient denies calf pain, thigh pain, chest pain. Review of Systems: A 12 point review of symptoms is unremarkable with the exception of the chief complaint. Patient specifically denies nausea, fever, vomiting, chills, shortness of breath, chest pain, abdominal pain, constipation or difficulty urinating. Objective     BP (!) 149/76   Pulse 80   Temp 98.9 °F (37.2 °C) (Oral)   Resp 16   Ht 5' 2\" (1.575 m)   Wt 190 lb (86.2 kg)   SpO2 94%   BMI 34.75 kg/m²      I/O:    Intake/Output Summary (Last 24 hours) at 6/5/2019 1033  Last data filed at 6/4/2019 2340  Gross per 24 hour   Intake 1320 ml   Output --   Net 1320 ml              Wt Readings from Last 3 Encounters:   05/31/19 190 lb (86.2 kg)   03/25/19 188 lb (85.3 kg)   02/11/19 188 lb (85.3 kg)       LABS:    Recent Labs     06/04/19  0420 06/05/19  0705   WBC 11.8* 10.9   HGB 10.6* 9.9*   HCT 31.9* 30.4*   * 398        Recent Labs     06/03/19  1800  06/05/19  0705      < > 136   K 5.1   < > 4.8      < > 100   CO2 26   < > 25   PHOS 4.6  --   --    BUN 35*   < > 33*   CREATININE 1.6*   < > 1.4*    < > = values in this interval not displayed. Recent Labs     06/03/19  0628 06/04/19  0420   INR 0.93 0.98           LOWER EXTREMITY EXAMINATION  Dressing to B/L LE intact. VASCULAR: DP and PT pulses are palpable 1/4 b/l. CFT is brisk to the remaining digits of the foot, b/l. Skin temperature is warm to warm from proximal to distal with no focal calor noted, Right lower extremity. No edema noted, Right lower extremity.  No pain with calf compression, Right lower extremity      NEUROLOGIC: Gross and epicritic sensation is diminished b/l. Protective sensation is diminished at all pedal sites b/l. DERMATOLOGIC: Wound noted to plantar aspect Right foot sub 4th metatarsal head. Wound measures  3.2 x 2.4 x 0.6 cm. Periwound maceration noted sub 4th metatarsal region, Right foot. Purulent and serous drainage noted. Wound does probe to bone. Wound does track to dorsum of Right foot and does undermine circumferentially approximately 1 cm in diameter. Malodor noted. Xerosis noted to the Right foot. Post debridement produced purulence. Wound noted to Left foot sub 1st metatarsal head, wound measures 1.8 x 1.8 x 0.2 cm. Wound base is fibrotic with periwound hyperkeratosis noted. No drainage noted. No malodor noted. Wound does not probe, track or undermine. Malodor noted. Wound noted to the lateral aspect of the Left 4th digit at the level of the PIPJ. Wound measures <0.5 cm in diameter. Wound bed is fibrotic. Wound does not probe, track or undermine.           MUSCULOSKELETAL: Muscle strength is 5/5 for all pedal groups tested. Pain with palpation to the plantar aspect of the Right foot at the level of the wound. 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 IMAGING:  R foot MRI done 6/1/19   Impression:   1. 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   2. Soft tissue ulceration of the lateral foot with subcutaneous emphysema. 3. 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 Given the proximity to this ulceration this is    suspicious for early involvement of osteomyelitis. 4. Moderate edema of the cuboid with transversely oriented T1 hypointense line suspicious for stress or insufficiency fracture.     5. Moderate edema of the posterior calcaneus with fracture line of Thursday afternoon. Discussed assessment and plan with Dr. Luly Sawyer.     Kobe Vickers, RAMONE   Podiatric Resident, PGY-2  Pager: [phone]  6/5/2019, 10:33 AM

## 2019-06-05 NOTE — FLOWSHEET NOTE
06/03/19 1632   Encounter Summary   Services provided to: Patient   Volunteer Visit   (Manual [place] )   Routine   Intervention Prayer;[place]

## 2019-06-06 ENCOUNTER — ANESTHESIA (OUTPATIENT)
Dept: OPERATING ROOM | Age: 54
DRG: 617 | End: 2019-06-06
Payer: COMMERCIAL

## 2019-06-06 ENCOUNTER — APPOINTMENT (OUTPATIENT)
Dept: GENERAL RADIOLOGY | Age: 54
DRG: 617 | End: 2019-06-06
Attending: PODIATRIST
Payer: COMMERCIAL

## 2019-06-06 VITALS — DIASTOLIC BLOOD PRESSURE: 64 MMHG | SYSTOLIC BLOOD PRESSURE: 120 MMHG | TEMPERATURE: 98.2 F | OXYGEN SATURATION: 95 %

## 2019-06-06 LAB
ANION GAP SERPL CALCULATED.3IONS-SCNC: 11 MMOL/L (ref 3–16)
BASOPHILS ABSOLUTE: 0.1 K/UL (ref 0–0.2)
BASOPHILS RELATIVE PERCENT: 0.5 %
BUN BLDV-MCNC: 39 MG/DL (ref 7–20)
CALCIUM SERPL-MCNC: 9 MG/DL (ref 8.3–10.6)
CHLORIDE BLD-SCNC: 102 MMOL/L (ref 99–110)
CO2: 24 MMOL/L (ref 21–32)
CREAT SERPL-MCNC: 1.8 MG/DL (ref 0.6–1.1)
EOSINOPHILS ABSOLUTE: 0.2 K/UL (ref 0–0.6)
EOSINOPHILS RELATIVE PERCENT: 1.5 %
GFR AFRICAN AMERICAN: 35
GFR NON-AFRICAN AMERICAN: 29
GLUCOSE BLD-MCNC: 116 MG/DL (ref 70–99)
GLUCOSE BLD-MCNC: 129 MG/DL (ref 70–99)
GLUCOSE BLD-MCNC: 133 MG/DL (ref 70–99)
GLUCOSE BLD-MCNC: 137 MG/DL (ref 70–99)
GLUCOSE BLD-MCNC: 153 MG/DL (ref 70–99)
GLUCOSE BLD-MCNC: 163 MG/DL (ref 70–99)
GLUCOSE BLD-MCNC: 179 MG/DL (ref 70–99)
GLUCOSE BLD-MCNC: 53 MG/DL (ref 70–99)
GLUCOSE BLD-MCNC: 66 MG/DL (ref 70–99)
GLUCOSE BLD-MCNC: 77 MG/DL (ref 70–99)
GLUCOSE BLD-MCNC: 91 MG/DL (ref 70–99)
GLUCOSE BLD-MCNC: 92 MG/DL (ref 70–99)
HCG(URINE) PREGNANCY TEST: NEGATIVE
HCT VFR BLD CALC: 23.5 % (ref 36–48)
HCT VFR BLD CALC: 28.8 % (ref 36–48)
HEMOGLOBIN: 7.6 G/DL (ref 12–16)
HEMOGLOBIN: 9.2 G/DL (ref 12–16)
LYMPHOCYTES ABSOLUTE: 2.5 K/UL (ref 1–5.1)
LYMPHOCYTES RELATIVE PERCENT: 21.8 %
MCH RBC QN AUTO: 28.1 PG (ref 26–34)
MCHC RBC AUTO-ENTMCNC: 31.9 G/DL (ref 31–36)
MCV RBC AUTO: 88 FL (ref 80–100)
MONOCYTES ABSOLUTE: 0.8 K/UL (ref 0–1.3)
MONOCYTES RELATIVE PERCENT: 6.6 %
NEUTROPHILS ABSOLUTE: 8 K/UL (ref 1.7–7.7)
NEUTROPHILS RELATIVE PERCENT: 69.6 %
PDW BLD-RTO: 16 % (ref 12.4–15.4)
PERFORMED ON: ABNORMAL
PERFORMED ON: NORMAL
PLATELET # BLD: 395 K/UL (ref 135–450)
PMV BLD AUTO: 8.1 FL (ref 5–10.5)
POTASSIUM SERPL-SCNC: 4.6 MMOL/L (ref 3.5–5.1)
RBC # BLD: 3.27 M/UL (ref 4–5.2)
SEDIMENTATION RATE, ERYTHROCYTE: >120 MM/HR (ref 0–30)
SODIUM BLD-SCNC: 137 MMOL/L (ref 136–145)
WBC # BLD: 11.5 K/UL (ref 4–11)

## 2019-06-06 PROCEDURE — 87205 SMEAR GRAM STAIN: CPT

## 2019-06-06 PROCEDURE — 6370000000 HC RX 637 (ALT 250 FOR IP): Performed by: STUDENT IN AN ORGANIZED HEALTH CARE EDUCATION/TRAINING PROGRAM

## 2019-06-06 PROCEDURE — 6360000002 HC RX W HCPCS: Performed by: STUDENT IN AN ORGANIZED HEALTH CARE EDUCATION/TRAINING PROGRAM

## 2019-06-06 PROCEDURE — 86922 COMPATIBILITY TEST ANTIGLOB: CPT

## 2019-06-06 PROCEDURE — 86850 RBC ANTIBODY SCREEN: CPT

## 2019-06-06 PROCEDURE — 7100000000 HC PACU RECOVERY - FIRST 15 MIN: Performed by: PODIATRIST

## 2019-06-06 PROCEDURE — 85018 HEMOGLOBIN: CPT

## 2019-06-06 PROCEDURE — 84703 CHORIONIC GONADOTROPIN ASSAY: CPT

## 2019-06-06 PROCEDURE — 87206 SMEAR FLUORESCENT/ACID STAI: CPT

## 2019-06-06 PROCEDURE — 0J9Q0ZZ DRAINAGE OF RIGHT FOOT SUBCUTANEOUS TISSUE AND FASCIA, OPEN APPROACH: ICD-10-PCS | Performed by: PODIATRIST

## 2019-06-06 PROCEDURE — 86870 RBC ANTIBODY IDENTIFICATION: CPT

## 2019-06-06 PROCEDURE — 87186 SC STD MICRODIL/AGAR DIL: CPT

## 2019-06-06 PROCEDURE — 85014 HEMATOCRIT: CPT

## 2019-06-06 PROCEDURE — 88311 DECALCIFY TISSUE: CPT

## 2019-06-06 PROCEDURE — 87077 CULTURE AEROBIC IDENTIFY: CPT

## 2019-06-06 PROCEDURE — 86880 COOMBS TEST DIRECT: CPT

## 2019-06-06 PROCEDURE — 7100000001 HC PACU RECOVERY - ADDTL 15 MIN: Performed by: PODIATRIST

## 2019-06-06 PROCEDURE — 2580000003 HC RX 258: Performed by: STUDENT IN AN ORGANIZED HEALTH CARE EDUCATION/TRAINING PROGRAM

## 2019-06-06 PROCEDURE — 6360000002 HC RX W HCPCS: Performed by: NURSE ANESTHETIST, CERTIFIED REGISTERED

## 2019-06-06 PROCEDURE — 80048 BASIC METABOLIC PNL TOTAL CA: CPT

## 2019-06-06 PROCEDURE — 99232 SBSQ HOSP IP/OBS MODERATE 35: CPT | Performed by: INTERNAL MEDICINE

## 2019-06-06 PROCEDURE — 3700000001 HC ADD 15 MINUTES (ANESTHESIA): Performed by: PODIATRIST

## 2019-06-06 PROCEDURE — 87015 SPECIMEN INFECT AGNT CONCNTJ: CPT

## 2019-06-06 PROCEDURE — 2580000003 HC RX 258: Performed by: NURSE ANESTHETIST, CERTIFIED REGISTERED

## 2019-06-06 PROCEDURE — 3700000000 HC ANESTHESIA ATTENDED CARE: Performed by: PODIATRIST

## 2019-06-06 PROCEDURE — 87102 FUNGUS ISOLATION CULTURE: CPT

## 2019-06-06 PROCEDURE — 86901 BLOOD TYPING SEROLOGIC RH(D): CPT

## 2019-06-06 PROCEDURE — 85025 COMPLETE CBC W/AUTO DIFF WBC: CPT

## 2019-06-06 PROCEDURE — 85652 RBC SED RATE AUTOMATED: CPT

## 2019-06-06 PROCEDURE — 2580000003 HC RX 258: Performed by: PODIATRIST

## 2019-06-06 PROCEDURE — C1713 ANCHOR/SCREW BN/BN,TIS/BN: HCPCS | Performed by: PODIATRIST

## 2019-06-06 PROCEDURE — 87070 CULTURE OTHR SPECIMN AEROBIC: CPT

## 2019-06-06 PROCEDURE — 6360000002 HC RX W HCPCS: Performed by: ANESTHESIOLOGY

## 2019-06-06 PROCEDURE — 73630 X-RAY EXAM OF FOOT: CPT

## 2019-06-06 PROCEDURE — 88305 TISSUE EXAM BY PATHOLOGIST: CPT

## 2019-06-06 PROCEDURE — 36415 COLL VENOUS BLD VENIPUNCTURE: CPT

## 2019-06-06 PROCEDURE — 6360000002 HC RX W HCPCS: Performed by: PODIATRIST

## 2019-06-06 PROCEDURE — 2720000010 HC SURG SUPPLY STERILE: Performed by: PODIATRIST

## 2019-06-06 PROCEDURE — 2709999900 HC NON-CHARGEABLE SUPPLY: Performed by: PODIATRIST

## 2019-06-06 PROCEDURE — 88304 TISSUE EXAM BY PATHOLOGIST: CPT

## 2019-06-06 PROCEDURE — 3600000012 HC SURGERY LEVEL 2 ADDTL 15MIN: Performed by: PODIATRIST

## 2019-06-06 PROCEDURE — 86900 BLOOD TYPING SEROLOGIC ABO: CPT

## 2019-06-06 PROCEDURE — 2500000003 HC RX 250 WO HCPCS: Performed by: NURSE ANESTHETIST, CERTIFIED REGISTERED

## 2019-06-06 PROCEDURE — 87116 MYCOBACTERIA CULTURE: CPT

## 2019-06-06 PROCEDURE — 3600000002 HC SURGERY LEVEL 2 BASE: Performed by: PODIATRIST

## 2019-06-06 PROCEDURE — 1200000000 HC SEMI PRIVATE

## 2019-06-06 PROCEDURE — 0Y6M0ZD DETACHMENT AT RIGHT FOOT, PARTIAL 4TH RAY, OPEN APPROACH: ICD-10-PCS | Performed by: PODIATRIST

## 2019-06-06 PROCEDURE — 0Y6M0ZF DETACHMENT AT RIGHT FOOT, PARTIAL 5TH RAY, OPEN APPROACH: ICD-10-PCS | Performed by: PODIATRIST

## 2019-06-06 DEVICE — ALLOGRAFT HUM TISS 1 CC AMNION AMNIFLO CRYOPRESERVED: Type: IMPLANTABLE DEVICE | Site: FOOT | Status: FUNCTIONAL

## 2019-06-06 RX ORDER — PROMETHAZINE HYDROCHLORIDE 25 MG/ML
6.25 INJECTION, SOLUTION INTRAMUSCULAR; INTRAVENOUS
Status: DISCONTINUED | OUTPATIENT
Start: 2019-06-06 | End: 2019-06-06 | Stop reason: HOSPADM

## 2019-06-06 RX ORDER — FENTANYL CITRATE 50 UG/ML
25 INJECTION, SOLUTION INTRAMUSCULAR; INTRAVENOUS EVERY 5 MIN PRN
Status: DISCONTINUED | OUTPATIENT
Start: 2019-06-06 | End: 2019-06-06 | Stop reason: HOSPADM

## 2019-06-06 RX ORDER — 0.9 % SODIUM CHLORIDE 0.9 %
250 INTRAVENOUS SOLUTION INTRAVENOUS ONCE
Status: COMPLETED | OUTPATIENT
Start: 2019-06-06 | End: 2019-06-07

## 2019-06-06 RX ORDER — SUCCINYLCHOLINE/SOD CL,ISO/PF 200MG/10ML
SYRINGE (ML) INTRAVENOUS PRN
Status: DISCONTINUED | OUTPATIENT
Start: 2019-06-06 | End: 2019-06-06 | Stop reason: SDUPTHER

## 2019-06-06 RX ORDER — PROPOFOL 10 MG/ML
INJECTION, EMULSION INTRAVENOUS PRN
Status: DISCONTINUED | OUTPATIENT
Start: 2019-06-06 | End: 2019-06-06 | Stop reason: SDUPTHER

## 2019-06-06 RX ORDER — ROCURONIUM BROMIDE 10 MG/ML
INJECTION, SOLUTION INTRAVENOUS PRN
Status: DISCONTINUED | OUTPATIENT
Start: 2019-06-06 | End: 2019-06-06 | Stop reason: SDUPTHER

## 2019-06-06 RX ORDER — EPHEDRINE SULFATE 50 MG/ML
INJECTION INTRAVENOUS PRN
Status: DISCONTINUED | OUTPATIENT
Start: 2019-06-06 | End: 2019-06-06 | Stop reason: SDUPTHER

## 2019-06-06 RX ORDER — FENTANYL CITRATE 50 UG/ML
INJECTION, SOLUTION INTRAMUSCULAR; INTRAVENOUS PRN
Status: DISCONTINUED | OUTPATIENT
Start: 2019-06-06 | End: 2019-06-06 | Stop reason: SDUPTHER

## 2019-06-06 RX ORDER — METHADONE HYDROCHLORIDE 10 MG/ML
140 CONCENTRATE ORAL DAILY
Status: DISCONTINUED | OUTPATIENT
Start: 2019-06-07 | End: 2019-06-07 | Stop reason: HOSPADM

## 2019-06-06 RX ORDER — MIDAZOLAM HYDROCHLORIDE 1 MG/ML
INJECTION INTRAMUSCULAR; INTRAVENOUS PRN
Status: DISCONTINUED | OUTPATIENT
Start: 2019-06-06 | End: 2019-06-06 | Stop reason: SDUPTHER

## 2019-06-06 RX ORDER — METHADONE HYDROCHLORIDE 10 MG/ML
140 CONCENTRATE ORAL ONCE
Status: COMPLETED | OUTPATIENT
Start: 2019-06-06 | End: 2019-06-06

## 2019-06-06 RX ORDER — SODIUM CHLORIDE, SODIUM LACTATE, POTASSIUM CHLORIDE, CALCIUM CHLORIDE 600; 310; 30; 20 MG/100ML; MG/100ML; MG/100ML; MG/100ML
INJECTION, SOLUTION INTRAVENOUS CONTINUOUS PRN
Status: DISCONTINUED | OUTPATIENT
Start: 2019-06-06 | End: 2019-06-06 | Stop reason: SDUPTHER

## 2019-06-06 RX ORDER — EPHEDRINE SULFATE 50 MG/ML
INJECTION, SOLUTION INTRAVENOUS PRN
Status: DISCONTINUED | OUTPATIENT
Start: 2019-06-06 | End: 2019-06-06 | Stop reason: SDUPTHER

## 2019-06-06 RX ORDER — FENTANYL CITRATE 50 UG/ML
50 INJECTION, SOLUTION INTRAMUSCULAR; INTRAVENOUS EVERY 5 MIN PRN
Status: DISCONTINUED | OUTPATIENT
Start: 2019-06-06 | End: 2019-06-06 | Stop reason: HOSPADM

## 2019-06-06 RX ORDER — BACITRACIN ZINC AND POLYMYXIN B SULFATE 500; 10000 [USP'U]/G; [USP'U]/G
OINTMENT TOPICAL PRN
Status: DISCONTINUED | OUTPATIENT
Start: 2019-06-06 | End: 2019-06-06 | Stop reason: ALTCHOICE

## 2019-06-06 RX ORDER — ONDANSETRON 2 MG/ML
4 INJECTION INTRAMUSCULAR; INTRAVENOUS
Status: DISCONTINUED | OUTPATIENT
Start: 2019-06-06 | End: 2019-06-06 | Stop reason: HOSPADM

## 2019-06-06 RX ADMIN — PHENYLEPHRINE HYDROCHLORIDE 200 MCG: 10 INJECTION, SOLUTION INTRAMUSCULAR; INTRAVENOUS; SUBCUTANEOUS at 14:30

## 2019-06-06 RX ADMIN — DEXTROSE MONOHYDRATE 12.5 G: 500 INJECTION PARENTERAL at 14:16

## 2019-06-06 RX ADMIN — HYOSCYAMINE SULFATE: 16 SOLUTION at 08:00

## 2019-06-06 RX ADMIN — DEXTROSE MONOHYDRATE 12.5 G: 500 INJECTION PARENTERAL at 11:40

## 2019-06-06 RX ADMIN — ROCURONIUM BROMIDE 10 MG: 10 INJECTION, SOLUTION INTRAVENOUS at 13:15

## 2019-06-06 RX ADMIN — EPHEDRINE SULFATE 5 MG: 50 INJECTION INTRAVENOUS at 13:45

## 2019-06-06 RX ADMIN — PHENYLEPHRINE HYDROCHLORIDE 200 MCG: 10 INJECTION, SOLUTION INTRAMUSCULAR; INTRAVENOUS; SUBCUTANEOUS at 14:05

## 2019-06-06 RX ADMIN — COLLAGENASE SANTYL: 250 OINTMENT TOPICAL at 08:00

## 2019-06-06 RX ADMIN — PHENYLEPHRINE HYDROCHLORIDE 200 MCG: 10 INJECTION, SOLUTION INTRAMUSCULAR; INTRAVENOUS; SUBCUTANEOUS at 14:25

## 2019-06-06 RX ADMIN — EPHEDRINE SULFATE 10 MG: 50 INJECTION, SOLUTION INTRAMUSCULAR; INTRAVENOUS; SUBCUTANEOUS at 14:36

## 2019-06-06 RX ADMIN — PHENYLEPHRINE HYDROCHLORIDE 200 MCG: 10 INJECTION, SOLUTION INTRAMUSCULAR; INTRAVENOUS; SUBCUTANEOUS at 14:18

## 2019-06-06 RX ADMIN — INSULIN GLARGINE 40 UNITS: 100 INJECTION, SOLUTION SUBCUTANEOUS at 22:45

## 2019-06-06 RX ADMIN — PHENYLEPHRINE HYDROCHLORIDE 200 MCG: 10 INJECTION, SOLUTION INTRAMUSCULAR; INTRAVENOUS; SUBCUTANEOUS at 14:10

## 2019-06-06 RX ADMIN — ROCURONIUM BROMIDE 10 MG: 10 INJECTION, SOLUTION INTRAVENOUS at 13:26

## 2019-06-06 RX ADMIN — PHENYLEPHRINE HYDROCHLORIDE 100 MCG: 10 INJECTION, SOLUTION INTRAMUSCULAR; INTRAVENOUS; SUBCUTANEOUS at 14:44

## 2019-06-06 RX ADMIN — PROPOFOL 200 MG: 10 INJECTION, EMULSION INTRAVENOUS at 13:15

## 2019-06-06 RX ADMIN — PHENYLEPHRINE HYDROCHLORIDE 100 MCG: 10 INJECTION, SOLUTION INTRAMUSCULAR; INTRAVENOUS; SUBCUTANEOUS at 14:51

## 2019-06-06 RX ADMIN — HYDROMORPHONE HYDROCHLORIDE 0.5 MG: 1 INJECTION, SOLUTION INTRAMUSCULAR; INTRAVENOUS; SUBCUTANEOUS at 15:44

## 2019-06-06 RX ADMIN — PHENYLEPHRINE HYDROCHLORIDE 200 MCG: 10 INJECTION, SOLUTION INTRAMUSCULAR; INTRAVENOUS; SUBCUTANEOUS at 13:55

## 2019-06-06 RX ADMIN — INSULIN LISPRO 2 UNITS: 100 INJECTION, SOLUTION INTRAVENOUS; SUBCUTANEOUS at 22:45

## 2019-06-06 RX ADMIN — MIDAZOLAM HYDROCHLORIDE 2 MG: 2 INJECTION, SOLUTION INTRAMUSCULAR; INTRAVENOUS at 13:15

## 2019-06-06 RX ADMIN — EPHEDRINE SULFATE 10 MG: 50 INJECTION, SOLUTION INTRAMUSCULAR; INTRAVENOUS; SUBCUTANEOUS at 13:53

## 2019-06-06 RX ADMIN — PHENYLEPHRINE HYDROCHLORIDE 200 MCG: 10 INJECTION, SOLUTION INTRAMUSCULAR; INTRAVENOUS; SUBCUTANEOUS at 13:49

## 2019-06-06 RX ADMIN — HYDROMORPHONE HYDROCHLORIDE 0.5 MG: 1 INJECTION, SOLUTION INTRAMUSCULAR; INTRAVENOUS; SUBCUTANEOUS at 16:04

## 2019-06-06 RX ADMIN — EPHEDRINE SULFATE 10 MG: 50 INJECTION, SOLUTION INTRAMUSCULAR; INTRAVENOUS; SUBCUTANEOUS at 13:54

## 2019-06-06 RX ADMIN — EPHEDRINE SULFATE 5 MG: 50 INJECTION, SOLUTION INTRAMUSCULAR; INTRAVENOUS; SUBCUTANEOUS at 13:50

## 2019-06-06 RX ADMIN — PHENYLEPHRINE HYDROCHLORIDE 200 MCG: 10 INJECTION, SOLUTION INTRAMUSCULAR; INTRAVENOUS; SUBCUTANEOUS at 14:35

## 2019-06-06 RX ADMIN — EPHEDRINE SULFATE 5 MG: 50 INJECTION, SOLUTION INTRAMUSCULAR; INTRAVENOUS; SUBCUTANEOUS at 14:25

## 2019-06-06 RX ADMIN — SODIUM CHLORIDE, SODIUM LACTATE, POTASSIUM CHLORIDE, AND CALCIUM CHLORIDE: 600; 310; 30; 20 INJECTION, SOLUTION INTRAVENOUS at 12:56

## 2019-06-06 RX ADMIN — EPHEDRINE SULFATE 5 MG: 50 INJECTION, SOLUTION INTRAMUSCULAR; INTRAVENOUS; SUBCUTANEOUS at 14:14

## 2019-06-06 RX ADMIN — FENTANYL CITRATE 50 MCG: 50 INJECTION INTRAMUSCULAR; INTRAVENOUS at 13:15

## 2019-06-06 RX ADMIN — PIPERACILLIN SODIUM,TAZOBACTAM SODIUM 3.38 G: 3; .375 INJECTION, POWDER, FOR SOLUTION INTRAVENOUS at 17:47

## 2019-06-06 RX ADMIN — DEXTROSE MONOHYDRATE 6.25 G: 500 INJECTION PARENTERAL at 13:15

## 2019-06-06 RX ADMIN — DEXTROSE MONOHYDRATE 100 ML/HR: 50 INJECTION, SOLUTION INTRAVENOUS at 12:25

## 2019-06-06 RX ADMIN — EPHEDRINE SULFATE 5 MG: 50 INJECTION, SOLUTION INTRAMUSCULAR; INTRAVENOUS; SUBCUTANEOUS at 14:02

## 2019-06-06 RX ADMIN — PHENYLEPHRINE HYDROCHLORIDE 200 MCG: 10 INJECTION, SOLUTION INTRAMUSCULAR; INTRAVENOUS; SUBCUTANEOUS at 13:34

## 2019-06-06 RX ADMIN — PIPERACILLIN SODIUM,TAZOBACTAM SODIUM 3.38 G: 3; .375 INJECTION, POWDER, FOR SOLUTION INTRAVENOUS at 06:21

## 2019-06-06 RX ADMIN — Medication 140 MG: at 17:39

## 2019-06-06 RX ADMIN — Medication 160 MG: at 13:15

## 2019-06-06 RX ADMIN — INSULIN LISPRO 8 UNITS: 100 INJECTION, SOLUTION INTRAVENOUS; SUBCUTANEOUS at 17:45

## 2019-06-06 RX ADMIN — SODIUM CHLORIDE, SODIUM LACTATE, POTASSIUM CHLORIDE, AND CALCIUM CHLORIDE: 600; 310; 30; 20 INJECTION, SOLUTION INTRAVENOUS at 15:00

## 2019-06-06 RX ADMIN — GABAPENTIN 800 MG: 400 CAPSULE ORAL at 17:45

## 2019-06-06 ASSESSMENT — PULMONARY FUNCTION TESTS
PIF_VALUE: 31
PIF_VALUE: 29
PIF_VALUE: 29
PIF_VALUE: 27
PIF_VALUE: 27
PIF_VALUE: 26
PIF_VALUE: 1
PIF_VALUE: 5
PIF_VALUE: 27
PIF_VALUE: 30
PIF_VALUE: 26
PIF_VALUE: 26
PIF_VALUE: 2
PIF_VALUE: 26
PIF_VALUE: 14
PIF_VALUE: 26
PIF_VALUE: 13
PIF_VALUE: 26
PIF_VALUE: 27
PIF_VALUE: 26
PIF_VALUE: 26
PIF_VALUE: 27
PIF_VALUE: 30
PIF_VALUE: 14
PIF_VALUE: 28
PIF_VALUE: 15
PIF_VALUE: 15
PIF_VALUE: 28
PIF_VALUE: 13
PIF_VALUE: 28
PIF_VALUE: 27
PIF_VALUE: 30
PIF_VALUE: 14
PIF_VALUE: 26
PIF_VALUE: 27
PIF_VALUE: 14
PIF_VALUE: 28
PIF_VALUE: 27
PIF_VALUE: 12
PIF_VALUE: 4
PIF_VALUE: 0
PIF_VALUE: 28
PIF_VALUE: 14
PIF_VALUE: 1
PIF_VALUE: 35
PIF_VALUE: 27
PIF_VALUE: 2
PIF_VALUE: 27
PIF_VALUE: 1
PIF_VALUE: 28
PIF_VALUE: 9
PIF_VALUE: 26
PIF_VALUE: 29
PIF_VALUE: 1
PIF_VALUE: 28
PIF_VALUE: 15
PIF_VALUE: 26
PIF_VALUE: 26
PIF_VALUE: 14
PIF_VALUE: 26
PIF_VALUE: 27
PIF_VALUE: 16
PIF_VALUE: 26
PIF_VALUE: 12
PIF_VALUE: 14
PIF_VALUE: 26
PIF_VALUE: 14
PIF_VALUE: 27
PIF_VALUE: 26
PIF_VALUE: 26
PIF_VALUE: 1
PIF_VALUE: 28
PIF_VALUE: 26
PIF_VALUE: 26
PIF_VALUE: 27
PIF_VALUE: 26
PIF_VALUE: 1
PIF_VALUE: 27
PIF_VALUE: 26
PIF_VALUE: 14
PIF_VALUE: 14
PIF_VALUE: 27
PIF_VALUE: 26
PIF_VALUE: 27
PIF_VALUE: 1
PIF_VALUE: 26
PIF_VALUE: 13
PIF_VALUE: 1
PIF_VALUE: 12
PIF_VALUE: 13
PIF_VALUE: 28
PIF_VALUE: 27
PIF_VALUE: 27
PIF_VALUE: 26
PIF_VALUE: 33
PIF_VALUE: 29
PIF_VALUE: 27
PIF_VALUE: 27
PIF_VALUE: 26
PIF_VALUE: 14
PIF_VALUE: 0
PIF_VALUE: 15
PIF_VALUE: 27
PIF_VALUE: 1
PIF_VALUE: 1
PIF_VALUE: 14
PIF_VALUE: 1
PIF_VALUE: 26
PIF_VALUE: 3
PIF_VALUE: 26
PIF_VALUE: 15
PIF_VALUE: 29
PIF_VALUE: 26

## 2019-06-06 ASSESSMENT — PAIN DESCRIPTION - FREQUENCY: FREQUENCY: CONTINUOUS

## 2019-06-06 ASSESSMENT — PAIN DESCRIPTION - DESCRIPTORS: DESCRIPTORS: ACHING

## 2019-06-06 ASSESSMENT — PAIN DESCRIPTION - PROGRESSION: CLINICAL_PROGRESSION: NOT CHANGED

## 2019-06-06 ASSESSMENT — PAIN DESCRIPTION - LOCATION: LOCATION: FOOT

## 2019-06-06 ASSESSMENT — PAIN SCALES - GENERAL
PAINLEVEL_OUTOF10: 0
PAINLEVEL_OUTOF10: 7
PAINLEVEL_OUTOF10: 3
PAINLEVEL_OUTOF10: 8
PAINLEVEL_OUTOF10: 8
PAINLEVEL_OUTOF10: 6
PAINLEVEL_OUTOF10: 0
PAINLEVEL_OUTOF10: 0

## 2019-06-06 ASSESSMENT — PAIN DESCRIPTION - PAIN TYPE
TYPE: SURGICAL PAIN
TYPE: ACUTE PAIN

## 2019-06-06 ASSESSMENT — PAIN - FUNCTIONAL ASSESSMENT: PAIN_FUNCTIONAL_ASSESSMENT: PREVENTS OR INTERFERES SOME ACTIVE ACTIVITIES AND ADLS

## 2019-06-06 ASSESSMENT — PAIN DESCRIPTION - ONSET: ONSET: ON-GOING

## 2019-06-06 ASSESSMENT — PAIN DESCRIPTION - ORIENTATION
ORIENTATION: RIGHT;LEFT
ORIENTATION: RIGHT

## 2019-06-06 NOTE — ANESTHESIA POSTPROCEDURE EVALUATION
Department of Anesthesiology  Postprocedure Note    Patient: Ricarda Weaver  MRN: [id number]  YOB: 1965  Date of evaluation: 6/6/2019  Time:  6:02 PM     Procedure Summary     Date:  06/06/19 Room / Location:  [facility] OR [address] [place] [address] OR    Anesthesia Start:  3999 Anesthesia Stop:  9766    Procedure:  RIGHT FOOT INCISION AND DRAINAGE WITH STAGING TRANSMETATARSAL AMPUTATION (Right ) Diagnosis:  (OSTEOMYELITIS RIGHT FOOT)    Surgeon:  Crystal Boyd DPM Responsible Provider:  Tammie Gallo MD    Anesthesia Type:  general ASA Status:  3          Anesthesia Type: general    Norma Phase I: Norma Score: 8    Norma Phase II:      Last vitals: Reviewed and per EMR flowsheets.        Anesthesia Post Evaluation    Patient location during evaluation: PACU  Patient participation: complete - patient participated  Level of consciousness: awake and alert  Airway patency: patent  Nausea & Vomiting: no nausea and no vomiting  Cardiovascular status: blood pressure returned to baseline  Respiratory status: acceptable  Hydration status: euvolemic

## 2019-06-06 NOTE — BRIEF OP NOTE
Brief Postoperative Note  ______________________________________________________________    Patient: Lise Enriquez  YOB: 1965  MRN: [id number]  Date of Procedure: 6/6/2019    Pre-Op Diagnosis: OSTEOMYELITIS RIGHT FOOT    Post-Op Diagnosis: Same       Procedure(s):  RIGHT FOOT INCISION AND DRAINAGE WITH STAGING TRANSMETATARSAL AMPUTATION    Anesthesia: General    Surgeon(s):  Mello Hopper DPM    Assistant(s):Yoly Osman PGY2      Injectables:   Amniflo     Hemostasis:   Anatomic dissection    Materials:   3-0 and 4-0 nylon        Estimated Blood Loss: 025     Complications: None          Specimens:   ID Type Source Tests Collected by Time Destination   1 : FOURTH METATARSAL RIGHT FOOT Tissue Tissue FUNGUS CULTURE, TISSUE CULTURE, ACID FAST CULTURE WITH SMEAR Mello Hopper DPM 6/6/2019 1354    A : FIFTH METATARSAL RIGHT FOOT Specimen Foot SURGICAL PATHOLOGY Mello Hopper DPM 6/6/2019 1357    B : CLEARNACE FRAGMENT RIGHT FOOT Specimen Foot SURGICAL PATHOLOGY Mello Hopper DPM 6/6/2019 1400        Finding: necrotic, eroded metatarsal head to the 4th digit with soft bone to the 3rd and 4th digits. Dispo: Removed necrotic tissue around the 4th metatarsal head and resection of the 4th metatarsal shaft with a clearance fragment. The bone was noted to be hard cortical bone and bleeding cancellous bone. No purulent drainage was noted. It was decided that this was a definitive procedure with closure.        Andrew Reyes DPM  Date: 6/6/2019  Time: 3:00 PM

## 2019-06-06 NOTE — PROGRESS NOTES
ID Follow-up NOTE  RESIDENT NOTE - reviewed / edited, attending note at bottom    CC:   Diabetic foot infection with osteomyelitis to the right foot  Antibiotics:  Zosyn     Admit Date: 5/31/2019  Hospital Day: 7    Subjective:     Patient is doing well with daughter in the room. She is ready for surgery. Patient denies f/c/n/v/sob/cp. Objective:     Patient Vitals for the past 8 hrs:   BP Temp Temp src Pulse Resp SpO2   06/06/19 0310 (!) 109/53 99.4 °F (37.4 °C) Axillary 75 16 91 %     I/O last 3 completed shifts: In: 250 [P.O.:240; I.V.:10]  Out: 0   No intake/output data recorded. EXAM:  GENERAL:      No apparent distress. HEENT:           Membranes moist, no oral lesion  NECK:             Supple  LUNGS:           Clear b/l, no rales, no dullness  CARDIAC:       RRR, no murmur appreciated  ABD:                + BS, soft / NT  EXT:                Plantar sub 1st met wound on left, sub 4th met wound on right   NEURO:          No focal neurologic findings  PSYCH:           Orientation, sensorium, mood normal  LINES:             Peripheral iv       Tracking with exposed 4th met head.          Data Review:  Lab Results   Component Value Date    WBC 11.5 (H) 06/06/2019    HGB 9.2 (L) 06/06/2019    HCT 28.8 (L) 06/06/2019    MCV 88.0 06/06/2019     06/06/2019     Lab Results   Component Value Date    CREATININE 1.8 (H) 06/06/2019    BUN 39 (H) 06/06/2019     06/06/2019    K 4.6 06/06/2019     06/06/2019    CO2 24 06/06/2019       Hepatic Function Panel:   Lab Results   Component Value Date    ALKPHOS 211 01/04/2019    ALT 23 01/04/2019    AST 26 01/04/2019    PROT 6.8 01/04/2019    PROT 6.6 07/21/2011    BILITOT <0.2 01/04/2019    BILIDIR <0.2 03/27/2018    IBILI see below 03/27/2018    LABALBU 3.5 06/03/2019       MICRO:  5/31 Wound cult: light growth E cloacae  Enterobacter cloacae   Antibiotic Interpretation ISAAC Status    amoxicillin-clavulanate Resistant >16/8 mcg/mL     ampicillin Resistant >16 mcg/mL     ceFAZolin Resistant >16 mcg/mL     cefepime Sensitive <=2 mcg/mL     cefotaxime Sensitive <=2 mcg/mL     cefTRIAXone Sensitive <=1 mcg/mL     cefuroxime Resistant <=4 mcg/mL     ciprofloxacin Sensitive <=1 mcg/mL     gentamicin Sensitive <=4 mcg/mL     meropenem Sensitive <=1 mcg/mL     piperacillin-tazobactam Sensitive <=16 mcg/mL     trimethoprim-sulfamethoxazole Sensitive <=2/38 mcg/mL         Imaging:   Xray   Findings worrisome for osteomyelitis of the head of the fourth metacarpal.       Changes of the posterior calcaneus which may be postsurgical, infectious or posttraumatic. Please correlate clinically          MRI: RIGHT    Impression:   1. 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   2. Soft tissue ulceration of the lateral foot with subcutaneous emphysema. 3. 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 Given the proximity to this ulceration this is    suspicious for early involvement of osteomyelitis. 4. Moderate edema of the cuboid with transversely oriented T1 hypointense line suspicious for stress or insufficiency fracture. 5. Moderate edema of the posterior calcaneus with fracture line of the far posterior superior calcaneus. The edema is favored to be related to primary fracture rather than representing osteomyelitis with a secondary pathologic fracture. 7. Slight edema without abnormal T1 signal of the third digit metatarsal head and proximal phalanx likely reactive. 8. Subcutaneous edema consistent with cellulitis. 9. Generalized edema of the musculature consistent with chronic small vessel ischemic changes some/denervation from diabetes.     10. Degenerative changes as above.        Scheduled Meds:   piperacillin-tazobactam  3.375 g Intravenous Q8H  insulin lispro  0-18 Units Subcutaneous TID WC    insulin lispro  0-9 Units Subcutaneous Nightly    sodium hypochlorite   Irrigation Daily    lidocaine 1 % injection  5 mL Intradermal Once    sodium chloride flush  10 mL Intravenous 2 times per day    amitriptyline  100 mg Oral Nightly    aspirin  325 mg Oral Daily    atorvastatin  20 mg Oral Nightly    [Held by provider] furosemide  40 mg Oral BID    gabapentin  800 mg Oral 4x Daily    methadone  140 mg Oral Daily    metoprolol succinate  50 mg Oral Nightly    pantoprazole  40 mg Oral QAM AC    collagenase   Topical Daily    insulin glargine  40 Units Subcutaneous BID    insulin lispro  8 Units Subcutaneous TID WC    insulin lispro  8 Units Subcutaneous Once       Continuous Infusions:   dextrose         PRN Meds:  sodium chloride flush, albuterol, naloxone, acetaminophen, docusate sodium, ondansetron, promethazine, glucose, dextrose, glucagon (rDNA), dextrose      Assessment:      46 yo woman with hx obesity, DM, neuropathy with:    Diabetic foot infection,   Osteomyelitis to right foot  -Wound culture: Enterobacter Cloacae  -MRI 4th ray osteomyelitis     Plan:     -Continue Zosyn  -I&D with TMA per Podiatry, today   -Abx recommendation upon discharge pending on final surgical cultures. Seen at bedside with Dr. Misbah Corona DPM   PGY-2, Pager #: [phone]    Addendum to Resident Progress note:  Pt seen, examined and evaluated. I have reviewed the current history, physical findings, labs and assessment and plan and agree with note as documented by resident (Patt Simmonds).    46 yo woman with hx obesity, DM, neuropathy.   Hx L partial hallux amp 2/2017 (cult + MRSA, E faecalis)  Hx R partial 5th ray resection 8/17/18, cult E cloacae, Ecoli  Hx R foot wound infection 1/2019, cult + E faecalis, Ps aeruginosa, C albicans  Hx R foot wound infection 3/2019, cult + GBS     Admit 5/31 - seen at [facility] and referred to [facility] for admission.   R foot wound with exposed 4th MT head.  Wound worse over time with bloody drain and pain per pt  Afebile, WBC 9.4, cult light growth E cloacae.  Started on vancomycin + zosyn  MRI with 4th MT and 4th prox phalanx destruction     IMP/  R DM foot wound with extension osteomyelitis   - has had GNR, E faecalis, GBS in past   - cult now with E cloacae   - MRI with 4th MT / prox phalanx osteomyelitis   - will need surgical debridement.  TMA recommended by Podiatry     REC/  Cont zosyn  Surgical debridement per Podiatry - TMA scheduled for today, 6/6     Discharge antibiotic plan depends on final culture results, surgery (if definitive)  Discussed with pt, family member   Flori Pitts MD

## 2019-06-06 NOTE — PROGRESS NOTES
ID consulted per podiatry patient to continue on zosyn  - MRI Positive for Osteomyelitis   - Patient to have surgery today Discharge antibiotics pending surgery     DM2  - Patient restarted on home Lantus 40 units BID  - Premeal insulin 8 units  - Low dose sliding scale  - Hypo glycemic protocol  - Currently well controlled        History of DVT  - On Therapeutic lovenox in light of pending surgery  - holding pm dose for procedure tomorrow  - will ask podiatry when it is ok to restart PO anticoagulation     CHFpEF compensated  - On toprol XL 50 mg  - continue Lasix 40 mg BID  - Nuclear stress test performed in 8-16/2018 negative for ischemia and EF 63%     Code Status: Full  FEN: NPO after midnight  PPx: Lovenox held for surgery today  Dispo: [facility]F     Plan discussed with Dr. Loyda Jenkins MD  6/6/2019  11:36 AM

## 2019-06-06 NOTE — PROGRESS NOTES
Patient is a/o x4. Patient denies c/o nausea. PAtinet c/o pain, repositioning provided however patient denies any satisfaction upon reassessment. BP labile, patient not symptomatic otherwise. .. Visitor present at bedside and overnight. Concern for patient/visitor turning off bed alarm. RN turned bed alarm on at 2200, upon check up at 0000, bed alarm not on. Bed alarm reactivated and patient and visitor educated on fall precautions and requirement for bed alarm. Fall precautions in place. Bed locked and in lowest position, bedside table and nurse call light within reach. Instructed patient to call out for assistance. Patient remains free from falls at this time, will continue to monitor.

## 2019-06-06 NOTE — PLAN OF CARE
Problem: Falls - Risk of:  Goal: Will remain free from falls  Description  Will remain free from falls  Outcome: Ongoing   Pt is free from falls. Fall precautions are in place: bed is locked and in lowest position, side rails are up x 2, bed alarm is on, nonskid socks are on, bedside table and call light are within reach. Will continue to monitor.

## 2019-06-06 NOTE — PROGRESS NOTES
Pt arrived form OR, s/p RIGHT FOOT INCISION AND DRAINAGE WITH STAGING TRANSMETATARSAL AMPUTATION (Right, report received form CRNA,  on arrival, had been lower in the OR and received d50 in OR

## 2019-06-06 NOTE — PROGRESS NOTES
Pt's blood sugar dropped to 53, 12.5 g D5 pushed intravenously and Dr. Esther More notified and aware. Pt's blood sugar 92 on reassessment and pt is asymptomatic. Pt sent to OR with D5 infusing continuously and OR is aware.

## 2019-06-06 NOTE — OP NOTE
Operative note     Patient: Magy Benton  YOB: 1965  MRN: [id number]  Date of Procedure: 6/6/2019    Pre-Op Diagnosis: OSTEOMYELITIS RIGHT FOOT    Post-Op Diagnosis: Same       Procedure(s):  Right foot:   Incision and drainage   Disarticulation to the 1st, 2nd and 3rd digits   Transmetatarsal amputation to 4th metatarsal    Further resection of the 5th ray    Anesthesia: General    Surgeon(s):  Lulu Fowler DPM    Assistant(s):Yoly Escobedo PGY2      Injectables:   Amniflo     Hemostasis:   Anatomic dissection    Materials:   3-0 and 4-0 nylon        Estimated Blood Loss: 654     Complications: None      Specimens:   ID Type Source Tests Collected by Time Destination   1 : FOURTH METATARSAL RIGHT FOOT Tissue Tissue FUNGUS CULTURE, TISSUE CULTURE, ACID FAST CULTURE WITH SMEAR Lulu Fowler DPM 6/6/2019 1354    A : FIFTH METATARSAL RIGHT FOOT Specimen Foot SURGICAL PATHOLOGY Lulu Fowler DPM 6/6/2019 1357    B : CLEARNACE FRAGMENT RIGHT FOOT Specimen Foot SURGICAL PATHOLOGY Lulu Fowler DPM 6/6/2019 1400        Finding: necrotic, eroded metatarsal head to the 4th digit with soft bone to the 3rd and 4th digits. Dispo: Removed necrotic tissue around the 4th metatarsal head and resection of the 4th metatarsal shaft with a clearance fragment. The bone was noted to be hard cortical bone and bleeding cancellous bone. No purulent drainage was noted. It was decided that this was a definitive procedure with closure. INDICATIONS FOR PROCEDURE:  51 yo female with history of obesity, diabetes type II, peripheral neuropathy with previous right partial 5th ray resection on 8/17/18 due to osteomyelitis. Patient has been admitted multiple times for nonhealing, infected wounds on the right foot. Patient was admitted to the hospital on 5/31 from [facility] podiatry clinic due to exposed 4th metatarsal head from plantar wound.  MRI reviewed with 4th metatarsal and 4th proximal phalanx osteomyelitis with early osteomyelitis to the distal aspect of 5th ray. For patient to have most functional foot without transfer pressure leading to future wounds and possible infection, it was decided to form a foot with a better parabola between the metatarsals; therefore, proceeded with the procedure of disarticulating the digits with 4th ray transmetatarsal amputation and further 5th ray resection. All the patient's questions were answered and no guarantees were given. The patient wished to proceed with surgery, and informed written consent was obtained. DETAILS OF PROCEDURE: The patient was brought from the pre-operative area and placed on the operating table in the supine position. The right  lower extremity was then scrubbed, prepped, and draped in the usual sterile fashion. A time-out was performed. The patient, procedure, and operative site were confirmed. Procedure #1: Disarticulation of digits 1,2,3 at the level of the metatarsophalangeal joint, right foot     Attention was directed towards the right  foot where using a #15 blade, a full thickness modified fishmouth incision was made just proximal to the dorsal sulcus of the toes with the plantar lip extending further distally. The incision was then deepened through the subcutaneous tissue to the level of bone. The plantar wound was also excised in a V-Y fashion for closure in the end. All bleeders were ligated using electrocautery as encountered. Next, the metatarsophalangeal joints were identified and attention was then directed to this location. A #15 blade was then used to release the extensor tendon as well as the medial and lateral collateral ligaments present at the metatarsophalangeal joint. The flexor tendon was then severed planarly proximal to the metatarsophalangeal joint and digits 1,2, and 3 were  removed distally and passed from the operative field and placed on the back table.       Procedure #2: Transmetatarsal amputation of the 4th ray, right foot     Attention was then directed towards the 4th metatarsal. The metatarsal head was noted to be discolored and eroded. Using a sagittal saw with a #138 blade, the distal 1/4 of the fourth metatarsal was resected and passed from the operating field and placed on the back table and sent to micro. Upon grasping the resected metatarsal head to remove it from the field. This was clearly necrotic bone where the integrity of the cortex and medullary canal was compromised. Attention was then directed towards the remaining devitalized soft tissue. The remaining necrotic tissue was then removed using sharp dissection until good bleeding healthy tissue was noted. Using the sagittal saw again, a clearance fragment was taken from the 4th metatarsal and passed to the back table and sent for pathology. The cortical bone was noted to be hard with bleeding medullary canal.       Procedure #3: Resection of the 5th ray, right foot     Attention was now directed towards the previously resected 5th ray of the right foot. The distal end had softer bone with mild discoloration. Using a sagittal saw, the 5th metatarsal was further resected. Approximately 1 cm of bone was taken from the distal aspect of the resected 5th ray. The bone was noted to be hard with a bleeding medullary canal.      At this time no necrotic bone or soft tissue was noted. Using a pulse irrigation system, the surgical site was irrigated using bactisure. Then approximately 3 L of normal saline was used to irrigate. At this time, the plantar flap was examined and noted to be excellent and adequate for closure. The soft tissue was healthy in appearance and the skin edges to the dorsal and plantar flap were noted to be bleeding adequately. The plantar flap was then rotated dorsally and the redundant and extra tissue removed using a #15 blade. The previous excised plantar wound was closed in a V-Y fashion.   The skin was closed using 3-0 and 4-0 nylon.  A soft sterile dressing was then applied. The patient tolerated the procedure and anesthesia well. The patient was transported from the OR to the PACU with vital signs stable and vascular status intact to all digits of the left foot. Following a short period of post-operative monitoring the patient is to be returned to his in hospital bed. Dictated on behalf of Dr. Lenny Mcdonnell.     Brooklyn Apodaca DPM   PGY-2, Pager #: [phone]

## 2019-06-06 NOTE — ANESTHESIA PRE PROCEDURE
Department of Anesthesiology  Preprocedure Note       Name:  Doni Vaughan   Age:  52 y.o.  :  1965                                          MRN:  [id number]         Date:  2019      Surgeon: Francisco Lutz):  Jeanie Jackman DPM    Procedure: RIGHT FOOT INCISION AND DRAINAGE WITH STAGING FOR LISFRANC AMPUTATION (Right )    Medications prior to admission:   Prior to Admission medications    Medication Sig Start Date End Date Taking? Authorizing Provider   ammonium lactate (LAC-HYDRIN) 12 % lotion Apply topically daily. 19   Mariluz Ibarra DPM   gabapentin (NEURONTIN) 800 MG tablet Take 1 tablet by mouth 4 times daily for 90 days. 19  Mabel Lemon DO   amitriptyline (ELAVIL) 100 MG tablet Take 1 tablet by mouth nightly 19   Sandra Mckee MD   apixaban (ELIQUIS STARTER PACK) 5 MG TABS tablet Take 10 mg (2 tablets) orally twice daily for 7 days, then take 5 mg (1 tablet) orally twice daily thereafter.  19   Silvestre Stewart MD   insulin lispro (HUMALOG) 100 UNIT/ML pen Inject 8 Units into the skin 3 times daily (with meals) 3/28/19   Starr Marcial MD   insulin glargine (BASAGLAR KWIKPEN) 100 UNIT/ML injection pen Inject 40 Units into the skin 2 times daily 3/27/19   Starr Marcial MD   metoprolol succinate (TOPROL XL) 50 MG extended release tablet Take 1 tablet by mouth nightly 3/27/19   Starr Marcial MD   aspirin 325 MG tablet Take 1 tablet by mouth daily 19   Maria Alejandra Ng MD   albuterol sulfate  (90 Base) MCG/ACT inhaler Inhale 2 puffs into the lungs every 6 hours as needed for Wheezing 19   Maria Alejandra Ng MD   omeprazole (PRILOSEC) 20 MG delayed release capsule Take 1 capsule by mouth Daily 19   Maria Alejandra Ng MD   atorvastatin (LIPITOR) 20 MG tablet Take 1 tablet by mouth daily 19   Maria Alejandra Ng MD   Lancets MISC 1 each by Does not apply route 2 times daily PHARMACY MAY SUBSTITUTE TO TRUE METRIX LANCETS 19   Maria Alejandra Ng MD Insulin Pen Needle 31G X 5 MM MISC 1 each by Does not apply route daily 2/19/19   Daniel Escalera MD   furosemide (LASIX) 40 MG tablet Take 1 tablet by mouth 2 times daily 2/11/19   Daniel Escalera MD   methadone (DOLOPHINE) 10 MG tablet Take 140 mg by mouth daily. Shannon Robles Historical Provider, MD   blood glucose test strips (TRUE METRIX BLOOD GLUCOSE TEST) strip 1 each by In Vitro route 2 times daily As needed. 7/23/18   Daniel Escalera MD   Gauze Pads & Dressings MISC Please dispense 4x8 guaze, kerlix, and ace 2/23/18   Parag Da Silva DPM   ammonium lactate (LAC-HYDRIN) 12 % lotion Apply topically daily. Patient taking differently: as needed Apply topically daily. 1/29/18   Parag Da Silva DPM   Accu-Chek Softclix Lancets MISC 1 strip by Does not apply route 2 times daily Check before breakfast and before going to bed 4/20/17   Daniel Escalera MD       Current medications:    No current facility-administered medications for this visit. No current outpatient medications on file.      Facility-Administered Medications Ordered in Other Visits   Medication Dose Route Frequency Provider Last Rate Last Dose    piperacillin-tazobactam (ZOSYN) 3.375 g in dextrose 5 % 100 mL IVPB extended infusion (mini-bag)  3.375 g Intravenous Q8H Richard Rowan DPM 25 mL/hr at 06/05/19 1441 3.375 g at 06/05/19 1441    insulin lispro (HUMALOG) injection pen 0-18 Units  0-18 Units Subcutaneous TID WC Shagufta Harris MD   6 Units at 06/05/19 1130    insulin lispro (HUMALOG) injection pen 0-9 Units  0-9 Units Subcutaneous Nightly Shagufta Harris MD   6 Units at 06/04/19 2204    sodium hypochlorite (DAKINS) 0.25 % external solution   Irrigation Daily Parag Da Silva DPM        lidocaine PF 1 % injection 5 mL  5 mL Intradermal Once Parag Da Silva DPM        sodium chloride flush 0.9 % injection 10 mL  10 mL Intravenous 2 times per day Parag Da Silva DPM   10 mL at 06/05/19 0850    sodium chloride flush 0.9 % injection 10 mL  10 mL Intravenous PRN Valiant Barefoot, DPM        enoxaparin (LOVENOX) injection 80 mg  80 mg Subcutaneous BID Valiant Barefoot, DPM   80 mg at 06/05/19 0844    albuterol (PROVENTIL) nebulizer solution 2.5 mg  2.5 mg Nebulization Q6H PRN Sharri Null, DPM        amitriptyline (ELAVIL) tablet 100 mg  100 mg Oral Nightly Sharri Null, DPM   100 mg at 06/04/19 2155    aspirin EC tablet 325 mg  325 mg Oral Daily Sharri Null, DPM   325 mg at 06/05/19 0844    atorvastatin (LIPITOR) tablet 20 mg  20 mg Oral Nightly Sharri Null, DPM   20 mg at 06/04/19 2155    [Held by provider] furosemide (LASIX) tablet 40 mg  40 mg Oral BID Sharri Null, DPM   40 mg at 06/02/19 0735    gabapentin (NEURONTIN) capsule 800 mg  800 mg Oral 4x Daily Sharri Null, DPM   800 mg at 06/05/19 1735    methadone (DOLOPHINE) 10 MG/ML solution 140 mg  140 mg Oral Daily Sharri Null, DPM   140 mg at 06/05/19 3060    metoprolol succinate (TOPROL XL) extended release tablet 50 mg  50 mg Oral Nightly Sharri Null, DPM   50 mg at 06/04/19 2154    pantoprazole (PROTONIX) tablet 40 mg  40 mg Oral QAM AC Sharri Null, DPM   40 mg at 06/05/19 0700    naloxone (NARCAN) injection 0.4 mg  0.4 mg Intravenous PRN Sharri Null, DPM        acetaminophen (TYLENOL) tablet 500 mg  500 mg Oral Q6H PRN Sharri Null, DPM        docusate sodium (COLACE) capsule 100 mg  100 mg Oral BID PRN Sharri Null, DPM   100 mg at 05/31/19 2013    ondansetron (ZOFRAN) injection 4 mg  4 mg Intravenous Q6H PRN Sharri Null, DPM        promethazine (PHENERGAN) tablet 12.5 mg  12.5 mg Oral Q6H PRN Sharri Null, DPM        collagenase ointment   Topical Daily Sharri Null, DPM        glucose (GLUTOSE) 40 % oral gel 15 g  15 g Oral PRN Sharri Null, DPM        dextrose 50 % solution 12.5 g  12.5 g Intravenous PRN Sharri Null, DPM        glucagon (rDNA) injection 1 mg  1 mg Intramuscular PRN Sharri Null, DPM        dextrose 5 % solution  100 mL/hr Intravenous PRN Lark Spine, DPM        insulin glargine (LANTUS) injection pen 40 Units  40 Units Subcutaneous BID Ruby Phillips MD   40 Units at 06/05/19 0834    insulin lispro (HUMALOG) injection pen 8 Units  8 Units Subcutaneous TID WC Ruby Phillips MD   Stopped at 06/05/19 1656    insulin lispro (HUMALOG) injection pen 8 Units  8 Units Subcutaneous Once Ruby Phillips MD           Allergies: Allergies   Allergen Reactions    Sulfa Antibiotics Itching       Problem List:    Patient Active Problem List   Diagnosis Code    Feet clawing Q66.89    Diabetic neuropathy (HCC) E11.40    Tinea pedis B35.3    Dyslipidemia E78.5    HTN (hypertension) I10    Amenorrhea N91.2    Dyspareunia EEL0625    Neuropathy G62.9    Bacterial vaginosis N76.0, B96.89    Pain in back M54.9    Anomalies of nails Q84.6    Tobacco abuse Z72.0    Vaginal bleeding, abnormal N93.9    Carpal tunnel syndrome G56.00    Scalp lesion L98.9    ETOH abuse F10.10    DM type 2, uncontrolled, with lower extremity ulcer (HCC) E11.622, E11.65, L97.909    Pseudocyst, pancreas K86.3    Pancreatitis K85.90    Asthma J45.909    Pain, eye, right H57.11    Pneumonia J18.9    Nicotine addiction F17.200    Acute pancreatitis K85.90    Hypoxia R09.02    Onychomycosis B35.1    Depressive disorder, not elsewhere classified F32.9    Anxiety state F41.1    Tinea pedis B35.3    Open wound of left foot S91.302A    Burn T30.0    Obesity (BMI 30-39. 9) E66.9    S/P foot surgery, right Z98.890    Pain medication agreement signed Z02.89    Post-op pain G89.18    Open wound of left foot with complication H29.591R    Cellulitis L03.90    Diabetic foot infection (HCC) E11.628, L08.9    Bilateral lower extremity edema R60.0    Chronic multifocal osteomyelitis of left foot (HCC) M86.372    Open wound of right foot S91.301A    Diabetic ulcer of right foot associated with type 2 diabetes mellitus, with bone involvement without evidence of necrosis ([facility] [address] E11.621, L97.516    Cellulitis of right foot T34.066    Acute systolic congestive heart failure ([facility]) I50.21    Acute osteomyelitis of metatarsal bone of right foot ([facility]) M86.171    Cellulitis of right lower extremity L03.115    Cast discomfort Z47.89    Bronchitis J40    Cellulitis and abscess of foot L03.119, L02.619    Diabetic polyneuropathy associated with type 2 diabetes mellitus ([facility]) E11.42    Group B streptococcal infection A49.1    Diabetic ulcer of left foot associated with type 2 diabetes mellitus, with fat layer exposed ([facility]) E11.621, L97.522    Tendonitis, Achilles, right M76.61    Diabetic ulcer of right foot due to type 2 diabetes mellitus ([facility]) E11.621, L97.519    Opiate dependence ([facility]) F11.20    Class 1 obesity in adult E66.9    CKD (chronic kidney disease), stage III ([facility]) N18.3    Diabetic foot infection ([facility]) E11.628, L08.9    Chronic osteomyelitis of right foot with draining sinus ([facility]) M86.471       Past Medical History:        Diagnosis Date    Amenorrhea     Anomalies of nails     Asthma 04    Athlete's foot 2010    Bacterial vaginosis 2008    Carpal tunnel syndrome 2007    COPD (chronic obstructive pulmonary disease) ([facility] [address]     Diabetes mellitus type II 2007    Diabetic neuropathy ([facility] [address] 8/10    DVT (deep venous thrombosis) ([facility] [address] 3/2004    Dyslipidemia 2009    Dyspareunia 2009    ETOH abuse 3/04/2007    Feet clawing     HTN (hypertension)     Hx of blood clots     Hyperlipidemia     MRSA (methicillin resistant staph aureus) culture positive 2017; 2017    foot; leg     Neuropathy 2009    polyneuropathy    Pain, back 2008    Pain, eye, right 04    Pancreatitis 04    Pseudocyst, pancreas 04    Scalp lesion 2007    Tinea pedis     Tobacco abuse 2008    Vaginal bleeding, abnormal 2007       Past Surgical History:        Procedure Laterality Date     SECTION  unknown    FOOT DEBRIDEMENT Right 2019    INCISION AND DRAINAGE WITH APPLICATION OF STRAVIX GRAFT RIGHT FOOT performed by Jessica Rascon DPM at [address] [facility] Left 2016    I & D left foot    OTHER SURGICAL HISTORY Right 10/20/2017    RIGHT GASTROC LENGTHENING ENDOSCOPIC, INJECTION OF AMNI GRAFT    OTHER SURGICAL HISTORY Right 2018    Diabetic foot ulcer I&D w/ integra graft application    OK DEBRIDEMENT, SKIN, SUB-Q TISSUE,MUSCLE,BONE,=<20 SQ CM Right 2018    RIGHT FOOT DEBRIDEMENT INCISION AND DRAINAGE, PARTIAL 5TH RAY AMPUTATION performed by Jessica Rascon DPM at [address] [facility] [address] / [address]  [address]    cryotherapy done on lesion    TOE AMPUTATION Left 2017    AMPUTATION LEFT GREAT TOE                    Social History:    Social History     Tobacco Use    Smoking status: Current Some Day Smoker     Packs/day: 1.00     Years: 30.00     Pack years: 30.00     Types: Cigarettes     Last attempt to quit: 2018     Years since quittin.1    Smokeless tobacco: Never Used    Tobacco comment: 1-2 cig/day   Substance Use Topics    Alcohol use: No     Alcohol/week: 2.4 - 3.0 oz     Types: 4 - 5 Standard drinks or equivalent per week     Comment: hx of etoh abuse, denies recent etoh use                                Ready to quit: Not Answered  Counseling given: Not Answered  Comment: 1-2 cig/day      Vital Signs (Current): There were no vitals filed for this visit.                                            BP Readings from Last 3 Encounters:   19 (!) 150/85   19 (!) 142/84   19 138/89       NPO Status:                                                                                 BMI:   Wt Readings from Last 3 Encounters:   19 190 lb (86.2 kg)   19 188 lb (85.3 kg)   19 188 lb (85.3 kg)     There is no height or weight on file to calculate BMI.    CBC:   Lab Results   Component Value Date    WBC 10.9 2019 Pseudocyst, pancreas. Endo/Other:    (+) DiabetesType II DM, poorly controlled, using insulin, . ROS comment: MRSA (methicillin resistant staph aureus) culture positive foot; leg   Abdominal:           Vascular: negative vascular ROS.  + DVT, . Anesthesia Plan      general     ASA 3       Induction: intravenous. MIPS: Postoperative opioids intended. Anesthetic plan and risks discussed with patient.                       Dennys Combs MD   6/5/2019

## 2019-06-07 ENCOUNTER — APPOINTMENT (OUTPATIENT)
Dept: ULTRASOUND IMAGING | Age: 54
DRG: 617 | End: 2019-06-07
Attending: PODIATRIST
Payer: COMMERCIAL

## 2019-06-07 VITALS
TEMPERATURE: 100 F | HEART RATE: 84 BPM | RESPIRATION RATE: 18 BRPM | DIASTOLIC BLOOD PRESSURE: 72 MMHG | SYSTOLIC BLOOD PRESSURE: 114 MMHG | BODY MASS INDEX: 34.96 KG/M2 | WEIGHT: 190 LBS | OXYGEN SATURATION: 94 % | HEIGHT: 62 IN

## 2019-06-07 PROBLEM — N17.9 AKI (ACUTE KIDNEY INJURY) (HCC): Status: ACTIVE | Noted: 2019-06-07

## 2019-06-07 PROBLEM — D62 ACUTE BLOOD LOSS ANEMIA: Status: ACTIVE | Noted: 2019-06-07

## 2019-06-07 LAB
ABO/RH: NORMAL
ANION GAP SERPL CALCULATED.3IONS-SCNC: 10 MMOL/L (ref 3–16)
ANION GAP SERPL CALCULATED.3IONS-SCNC: 10 MMOL/L (ref 3–16)
ANTIBODY IDENTIFICATION: NORMAL
ANTIBODY SCREEN: NORMAL
BASOPHILS ABSOLUTE: 0.1 K/UL (ref 0–0.2)
BASOPHILS ABSOLUTE: 0.1 K/UL (ref 0–0.2)
BASOPHILS RELATIVE PERCENT: 0.4 %
BASOPHILS RELATIVE PERCENT: 0.5 %
BLOOD BANK DISPENSE STATUS: NORMAL
BLOOD BANK DISPENSE STATUS: NORMAL
BLOOD BANK PRODUCT CODE: NORMAL
BLOOD BANK PRODUCT CODE: NORMAL
BPU ID: NORMAL
BPU ID: NORMAL
BUN BLDV-MCNC: 35 MG/DL (ref 7–20)
BUN BLDV-MCNC: 39 MG/DL (ref 7–20)
C-REACTIVE PROTEIN: 174.5 MG/L (ref 0–5.1)
CALCIUM SERPL-MCNC: 8.5 MG/DL (ref 8.3–10.6)
CALCIUM SERPL-MCNC: 8.8 MG/DL (ref 8.3–10.6)
CHLORIDE BLD-SCNC: 103 MMOL/L (ref 99–110)
CHLORIDE BLD-SCNC: 104 MMOL/L (ref 99–110)
CO2: 24 MMOL/L (ref 21–32)
CO2: 24 MMOL/L (ref 21–32)
CREAT SERPL-MCNC: 1.8 MG/DL (ref 0.6–1.1)
CREAT SERPL-MCNC: 1.9 MG/DL (ref 0.6–1.1)
CREATININE URINE: 107.6 MG/DL (ref 28–259)
DAT IGG: NORMAL
DESCRIPTION BLOOD BANK: NORMAL
DESCRIPTION BLOOD BANK: NORMAL
EOSINOPHILS ABSOLUTE: 0.1 K/UL (ref 0–0.6)
EOSINOPHILS ABSOLUTE: 0.1 K/UL (ref 0–0.6)
EOSINOPHILS RELATIVE PERCENT: 0.8 %
EOSINOPHILS RELATIVE PERCENT: 1 %
GFR AFRICAN AMERICAN: 33
GFR AFRICAN AMERICAN: 35
GFR NON-AFRICAN AMERICAN: 27
GFR NON-AFRICAN AMERICAN: 29
GLUCOSE BLD-MCNC: 129 MG/DL (ref 70–99)
GLUCOSE BLD-MCNC: 231 MG/DL (ref 70–99)
GLUCOSE BLD-MCNC: 237 MG/DL (ref 70–99)
GLUCOSE BLD-MCNC: 54 MG/DL (ref 70–99)
GLUCOSE BLD-MCNC: 96 MG/DL (ref 70–99)
HCT VFR BLD CALC: 23.6 % (ref 36–48)
HCT VFR BLD CALC: 28.2 % (ref 36–48)
HEMOGLOBIN: 7.6 G/DL (ref 12–16)
HEMOGLOBIN: 9.1 G/DL (ref 12–16)
LYMPHOCYTES ABSOLUTE: 1.7 K/UL (ref 1–5.1)
LYMPHOCYTES ABSOLUTE: 2.5 K/UL (ref 1–5.1)
LYMPHOCYTES RELATIVE PERCENT: 14.8 %
LYMPHOCYTES RELATIVE PERCENT: 16.5 %
MCH RBC QN AUTO: 28.4 PG (ref 26–34)
MCH RBC QN AUTO: 28.6 PG (ref 26–34)
MCHC RBC AUTO-ENTMCNC: 32 G/DL (ref 31–36)
MCHC RBC AUTO-ENTMCNC: 32.3 G/DL (ref 31–36)
MCV RBC AUTO: 88.4 FL (ref 80–100)
MCV RBC AUTO: 88.7 FL (ref 80–100)
MONOCYTES ABSOLUTE: 0.7 K/UL (ref 0–1.3)
MONOCYTES ABSOLUTE: 1.2 K/UL (ref 0–1.3)
MONOCYTES RELATIVE PERCENT: 5.6 %
MONOCYTES RELATIVE PERCENT: 7.6 %
NEUTROPHILS ABSOLUTE: 11.3 K/UL (ref 1.7–7.7)
NEUTROPHILS ABSOLUTE: 9.2 K/UL (ref 1.7–7.7)
NEUTROPHILS RELATIVE PERCENT: 74.4 %
NEUTROPHILS RELATIVE PERCENT: 78.4 %
PDW BLD-RTO: 16.2 % (ref 12.4–15.4)
PDW BLD-RTO: 16.3 % (ref 12.4–15.4)
PERFORMED ON: ABNORMAL
PERFORMED ON: ABNORMAL
PERFORMED ON: NORMAL
PLATELET # BLD: 353 K/UL (ref 135–450)
PLATELET # BLD: 353 K/UL (ref 135–450)
PMV BLD AUTO: 8.2 FL (ref 5–10.5)
PMV BLD AUTO: 8.3 FL (ref 5–10.5)
POTASSIUM SERPL-SCNC: 4.7 MMOL/L (ref 3.5–5.1)
POTASSIUM SERPL-SCNC: 5.3 MMOL/L (ref 3.5–5.1)
PROTEIN PROTEIN: 117.5 MG/DL
RBC # BLD: 2.66 M/UL (ref 4–5.2)
RBC # BLD: 3.19 M/UL (ref 4–5.2)
SEDIMENTATION RATE, ERYTHROCYTE: >120 MM/HR (ref 0–30)
SODIUM BLD-SCNC: 137 MMOL/L (ref 136–145)
SODIUM BLD-SCNC: 138 MMOL/L (ref 136–145)
SODIUM URINE: 33 MMOL/L
WBC # BLD: 11.8 K/UL (ref 4–11)
WBC # BLD: 15.1 K/UL (ref 4–11)

## 2019-06-07 PROCEDURE — 6370000000 HC RX 637 (ALT 250 FOR IP): Performed by: INTERNAL MEDICINE

## 2019-06-07 PROCEDURE — 2580000003 HC RX 258: Performed by: STUDENT IN AN ORGANIZED HEALTH CARE EDUCATION/TRAINING PROGRAM

## 2019-06-07 PROCEDURE — 84300 ASSAY OF URINE SODIUM: CPT

## 2019-06-07 PROCEDURE — P9016 RBC LEUKOCYTES REDUCED: HCPCS

## 2019-06-07 PROCEDURE — 86140 C-REACTIVE PROTEIN: CPT

## 2019-06-07 PROCEDURE — 80048 BASIC METABOLIC PNL TOTAL CA: CPT

## 2019-06-07 PROCEDURE — 2580000003 HC RX 258: Performed by: INTERNAL MEDICINE

## 2019-06-07 PROCEDURE — 97530 THERAPEUTIC ACTIVITIES: CPT

## 2019-06-07 PROCEDURE — 36430 TRANSFUSION BLD/BLD COMPNT: CPT

## 2019-06-07 PROCEDURE — 6370000000 HC RX 637 (ALT 250 FOR IP): Performed by: STUDENT IN AN ORGANIZED HEALTH CARE EDUCATION/TRAINING PROGRAM

## 2019-06-07 PROCEDURE — 85025 COMPLETE CBC W/AUTO DIFF WBC: CPT

## 2019-06-07 PROCEDURE — 36415 COLL VENOUS BLD VENIPUNCTURE: CPT

## 2019-06-07 PROCEDURE — 99232 SBSQ HOSP IP/OBS MODERATE 35: CPT | Performed by: INTERNAL MEDICINE

## 2019-06-07 PROCEDURE — 76770 US EXAM ABDO BACK WALL COMP: CPT

## 2019-06-07 PROCEDURE — 82570 ASSAY OF URINE CREATININE: CPT

## 2019-06-07 PROCEDURE — 97166 OT EVAL MOD COMPLEX 45 MIN: CPT

## 2019-06-07 PROCEDURE — 6360000002 HC RX W HCPCS: Performed by: STUDENT IN AN ORGANIZED HEALTH CARE EDUCATION/TRAINING PROGRAM

## 2019-06-07 PROCEDURE — 97162 PT EVAL MOD COMPLEX 30 MIN: CPT

## 2019-06-07 PROCEDURE — 97535 SELF CARE MNGMENT TRAINING: CPT

## 2019-06-07 PROCEDURE — 84156 ASSAY OF PROTEIN URINE: CPT

## 2019-06-07 RX ORDER — SODIUM CHLORIDE 9 MG/ML
INJECTION, SOLUTION INTRAVENOUS CONTINUOUS
Status: DISCONTINUED | OUTPATIENT
Start: 2019-06-07 | End: 2019-06-07 | Stop reason: HOSPADM

## 2019-06-07 RX ADMIN — INSULIN LISPRO 6 UNITS: 100 INJECTION, SOLUTION INTRAVENOUS; SUBCUTANEOUS at 13:02

## 2019-06-07 RX ADMIN — ASPIRIN 325 MG: 325 TABLET, DELAYED RELEASE ORAL at 08:45

## 2019-06-07 RX ADMIN — GABAPENTIN 800 MG: 400 CAPSULE ORAL at 08:45

## 2019-06-07 RX ADMIN — AMITRIPTYLINE HYDROCHLORIDE 100 MG: 50 TABLET, FILM COATED ORAL at 02:19

## 2019-06-07 RX ADMIN — Medication 10 ML: at 01:37

## 2019-06-07 RX ADMIN — ACETAMINOPHEN 500 MG: 500 TABLET, COATED ORAL at 01:35

## 2019-06-07 RX ADMIN — SODIUM CHLORIDE: 9 INJECTION, SOLUTION INTRAVENOUS at 12:24

## 2019-06-07 RX ADMIN — HYOSCYAMINE SULFATE: 16 SOLUTION at 11:05

## 2019-06-07 RX ADMIN — GABAPENTIN 800 MG: 400 CAPSULE ORAL at 01:36

## 2019-06-07 RX ADMIN — PANTOPRAZOLE SODIUM 40 MG: 40 TABLET, DELAYED RELEASE ORAL at 06:58

## 2019-06-07 RX ADMIN — Medication 140 MG: at 13:01

## 2019-06-07 RX ADMIN — COLLAGENASE SANTYL: 250 OINTMENT TOPICAL at 11:04

## 2019-06-07 RX ADMIN — PIPERACILLIN SODIUM,TAZOBACTAM SODIUM 3.38 G: 3; .375 INJECTION, POWDER, FOR SOLUTION INTRAVENOUS at 06:45

## 2019-06-07 RX ADMIN — SODIUM CHLORIDE 250 ML: 9 INJECTION, SOLUTION INTRAVENOUS at 04:34

## 2019-06-07 RX ADMIN — APIXABAN 5 MG: 5 TABLET, FILM COATED ORAL at 10:08

## 2019-06-07 RX ADMIN — PIPERACILLIN SODIUM,TAZOBACTAM SODIUM 3.38 G: 3; .375 INJECTION, POWDER, FOR SOLUTION INTRAVENOUS at 15:03

## 2019-06-07 RX ADMIN — ATORVASTATIN CALCIUM 20 MG: 20 TABLET, FILM COATED ORAL at 01:36

## 2019-06-07 RX ADMIN — INSULIN LISPRO 8 UNITS: 100 INJECTION, SOLUTION INTRAVENOUS; SUBCUTANEOUS at 13:02

## 2019-06-07 RX ADMIN — GABAPENTIN 800 MG: 400 CAPSULE ORAL at 13:00

## 2019-06-07 RX ADMIN — METOPROLOL SUCCINATE 50 MG: 50 TABLET, EXTENDED RELEASE ORAL at 01:36

## 2019-06-07 ASSESSMENT — PAIN DESCRIPTION - PROGRESSION
CLINICAL_PROGRESSION: NOT CHANGED

## 2019-06-07 ASSESSMENT — PAIN DESCRIPTION - PAIN TYPE: TYPE: SURGICAL PAIN;CHRONIC PAIN

## 2019-06-07 ASSESSMENT — PAIN SCALES - GENERAL
PAINLEVEL_OUTOF10: 7
PAINLEVEL_OUTOF10: 0
PAINLEVEL_OUTOF10: 0
PAINLEVEL_OUTOF10: 9
PAINLEVEL_OUTOF10: 4
PAINLEVEL_OUTOF10: 0

## 2019-06-07 ASSESSMENT — PAIN DESCRIPTION - LOCATION: LOCATION: FOOT

## 2019-06-07 ASSESSMENT — PAIN DESCRIPTION - FREQUENCY: FREQUENCY: CONTINUOUS

## 2019-06-07 ASSESSMENT — PAIN DESCRIPTION - ONSET: ONSET: ON-GOING

## 2019-06-07 NOTE — DISCHARGE INSTR - COC
foot associated with type 2 diabetes mellitus, with bone involvement without evidence of necrosis ([facility]) E11.621, L97.516    Cellulitis of right foot G24.405    Acute systolic congestive heart failure ([facility]) I50.21    Acute osteomyelitis of metatarsal bone of right foot ([facility]) M86.171    Cellulitis of right lower extremity L03.115    Cast discomfort Z47.89    Bronchitis J40    Cellulitis and abscess of foot L03.119, L02.619    Diabetic polyneuropathy associated with type 2 diabetes mellitus ([facility]) E11.42    Group B streptococcal infection A49.1    Diabetic ulcer of left foot associated with type 2 diabetes mellitus, with fat layer exposed ([facility]) E11.621, L97.522    Tendonitis, Achilles, right M76.61    Diabetic ulcer of right foot due to type 2 diabetes mellitus ([facility]) E11.621, L97.519    Opiate dependence ([facility]) F11.20    Class 1 obesity in adult E66.9    CKD (chronic kidney disease), stage III ([facility]) N18.3    Diabetic foot infection ([facility]) E11.628, L08.9    Chronic osteomyelitis of right foot with draining sinus ([facility]) M86.471       Isolation/Infection:   Isolation          No Isolation        Patient Infection Status     Infection Encounter Level?  Onset Date Added Added By Resolved Resolved By Review Date    MRSA No  08/02/11 Catarina Sánchez RN 05/11/15 Yamilet Bear RN     ca/colonization spt and nares          Nurse Assessment:  Last Vital Signs: /69   Pulse 97   Temp 98.8 °F (37.1 °C) (Oral)   Resp 18   Ht 5' 2\" (1.575 m)   Wt 190 lb (86.2 kg)   SpO2 94%   BMI 34.75 kg/m²     Last documented pain score (0-10 scale): Pain Level: 0  Last Weight:   Wt Readings from Last 1 Encounters:   05/31/19 190 lb (86.2 kg)     Mental Status:  oriented    IV Access:  - PICC - site  R Basilic, insertion date:     Nursing Mobility/ADLs:  Walking   Assisted  Transfer  Assisted  Bathing  Assisted  Dressing  Assisted  Toileting  Assisted  Feeding  [address]  Independent  Med Delivery whole    Wound Care Documentation and Therapy:  Wound 01/04/19 Foot Plantar;Right (Active)   Number of days: 153       Wound 01/04/19 Foot Left;Plantar under great toe (Active)   Number of days: 153        Elimination:  Continence:   · Bowel: Yes  · Bladder: Yes  Urinary Catheter: None   Colostomy/Ileostomy/Ileal Conduit: No       Date of Last BM: ***    Intake/Output Summary (Last 24 hours) at 6/7/2019 0635  Last data filed at 6/7/2019 0547  Gross per 24 hour   Intake 1057 ml   Output 400 ml   Net 657 ml     I/O last 3 completed shifts: In: 9205 [I.V.:1057]  Out: 100 [Blood:100]    Safety Concerns: At Risk for Falls    Impairments/Disabilities:      None    Nutrition Therapy:  Current Nutrition Therapy:   - Oral Diet:  Carb Control 4 carbs/meal (1800kcals/day)    Routes of Feeding: Oral  Liquids: Thin Liquids  Daily Fluid Restriction: no  Last Modified Barium Swallow with Video (Video Swallowing Test): not done    Treatments at the Time of Hospital Discharge:   Respiratory Treatments: NA    Oxygen Therapy:  is on oxygen at 2 L/min per nasal cannula.   Ventilator:    - No ventilator support    Rehab Therapies: Physical Therapy and Occupational Therapy  Weight Bearing Status/Restrictions: Non-weight bearing on right leg  Other Medical Equipment (for information only, NOT a DME order):  walker  Other Treatments: ***    Patient's personal belongings (please select all that are sent with patient):  Cherie Arredondo RN    CASE MANAGEMENT/SOCIAL WORK SECTION    Inpatient Status Date: 5/31/19    Readmission Risk Assessment Score:  Readmission Risk              Risk of Unplanned Readmission:        38           Discharging to Facility/ Agency   · Name: [facility]  · [facility] [address], [place], [address] [facility]  · PVOMY:[phone]  · BJF:[phone]      / signature: Electronically signed by SAURABH Hernandez LSW on 6/7/19 at 11:24 AM    PHYSICIAN SECTION    Prognosis: Fair    Condition at Discharge: Stable    Rehab Potential (if transferring to Rehab): Fair    Recommended Labs or Other Treatments After Discharge:     Right foot: Keep surgical Dressing on right foot clean dry and in tact, NON weight bearing to right foot,   Left foot: dress wound with tiffany, gauze, kerlix, ace, and surgical shoe. Change dressing daily. Heel WB to left foot in post op shoe with walker, f/u in podiatry clinic weekly. Physician Certification: I certify the above information and transfer of Tereza Kinsey  is necessary for the continuing treatment of the diagnosis listed and that she requires [facility] for less 30 days.      Update Admission H&P: No change in H&P    PHYSICIAN SIGNATURE:  Electronically signed by Lilian Monique DPM on 6/7/19 at 4:05 PM

## 2019-06-07 NOTE — PROGRESS NOTES
ID Follow-up NOTE  RESIDENT NOTE - reviewed / edited, attending note at bottom    CC:   Diabetic foot infection with osteomyelitis to the right foot  Antibiotics:  Zosyn     Admit Date: 5/31/2019  Hospital Day: 8    Subjective:     Patient doing well this morning. Patient denies f/c/n/v/sob/cp. Objective:     Patient Vitals for the past 8 hrs:   BP Temp Temp src Pulse Resp SpO2   06/07/19 0640 132/76 98.3 °F (36.8 °C) Oral 92 18 95 %   06/07/19 0620 123/69 98.8 °F (37.1 °C) Oral 97 18 94 %   06/07/19 0456 100/62 98.2 °F (36.8 °C) Oral 88 18 95 %   06/07/19 0415 123/69 99 °F (37.2 °C) Oral 92 18 96 %   06/07/19 0410 105/69 99.1 °F (37.3 °C) Oral 93 18 96 %   06/07/19 0405 104/64 98.9 °F (37.2 °C) Oral 97 18 92 %   06/07/19 0355 113/81 98.9 °F (37.2 °C) Oral 97 18 93 %   06/07/19 0032 121/63 101.1 °F (38.4 °C) Oral 92 16 92 %     I/O last 3 completed shifts: In: 6029 [I.V.:1057; Blood:300]  Out: 400 [Urine:300; Blood:100]  No intake/output data recorded. EXAM:  GENERAL:      No apparent distress. HEENT:           Membranes moist, no oral lesion  NECK:             Supple  LUNGS:           Clear b/l, no rales, no dullness  CARDIAC:       RRR, no murmur appreciated  ABD:                + BS, soft / NT  EXT:                incision with mild duskiness to incision site. No dehiscence noted.    NEURO:          No focal neurologic findings  PSYCH:           Orientation, sensorium, mood normal  LINES:             Peripheral iv               Data Review:  Lab Results   Component Value Date    WBC 15.1 (H) 06/07/2019    HGB 9.1 (L) 06/07/2019    HCT 28.2 (L) 06/07/2019    MCV 88.4 06/07/2019     06/07/2019     Lab Results   Component Value Date    CREATININE 1.9 (H) 06/07/2019    BUN 39 (H) 06/07/2019     06/07/2019    K 4.7 06/07/2019     06/07/2019    CO2 24 06/07/2019       Hepatic Function Panel:   Lab Results   Component Value Date    ALKPHOS 211 01/04/2019    ALT 23 01/04/2019    AST 26 01/04/2019    PROT 6.8 01/04/2019    PROT 6.6 07/21/2011    BILITOT <0.2 01/04/2019    BILIDIR <0.2 03/27/2018    IBILI see below 03/27/2018    LABALBU 3.5 06/03/2019       MICRO:  5/31 Wound cult: light growth E cloacae  Enterobacter cloacae   Antibiotic Interpretation ISAAC Status    amoxicillin-clavulanate Resistant >16/8 mcg/mL     ampicillin Resistant >16 mcg/mL     ceFAZolin Resistant >16 mcg/mL     cefepime Sensitive <=2 mcg/mL     cefotaxime Sensitive <=2 mcg/mL     cefTRIAXone Sensitive <=1 mcg/mL     cefuroxime Resistant <=4 mcg/mL     ciprofloxacin Sensitive <=1 mcg/mL     gentamicin Sensitive <=4 mcg/mL     meropenem Sensitive <=1 mcg/mL     piperacillin-tazobactam Sensitive <=16 mcg/mL     trimethoprim-sulfamethoxazole Sensitive <=2/38 mcg/mL         Imaging:   Xray   Findings worrisome for osteomyelitis of the head of the fourth metacarpal.       Changes of the posterior calcaneus which may be postsurgical, infectious or posttraumatic. Please correlate clinically          MRI: RIGHT    Impression:   1. 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   2. Soft tissue ulceration of the lateral foot with subcutaneous emphysema. 3. 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 Given the proximity to this ulceration this is    suspicious for early involvement of osteomyelitis. 4. Moderate edema of the cuboid with transversely oriented T1 hypointense line suspicious for stress or insufficiency fracture. 5. Moderate edema of the posterior calcaneus with fracture line of the far posterior superior calcaneus. The edema is favored to be related to primary fracture rather than representing osteomyelitis with a secondary pathologic fracture.    7. Slight edema without abnormal T1 signal of the third digit metatarsal head and proximal phalanx likely reactive. 8. Subcutaneous edema consistent with cellulitis. 9. Generalized edema of the musculature consistent with chronic small vessel ischemic changes some/denervation from diabetes. 10. Degenerative changes as above.      POST OP- XRAY        INDICATION: Transmetatarsal amputation,       COMPARISON: None       FINDINGS:       There is transmetatarsal amputation of the fourth and fifth rays.  There is also been prior amputation of the phalanges of the first, second and third rays.           Scheduled Meds:   insulin glargine  35 Units Subcutaneous BID    enoxaparin  40 mg Subcutaneous Daily    methadone  140 mg Oral Daily    sodium chloride  250 mL Intravenous Once    piperacillin-tazobactam  3.375 g Intravenous Q8H    insulin lispro  0-18 Units Subcutaneous TID WC    insulin lispro  0-9 Units Subcutaneous Nightly    sodium hypochlorite   Irrigation Daily    lidocaine 1 % injection  5 mL Intradermal Once    sodium chloride flush  10 mL Intravenous 2 times per day    amitriptyline  100 mg Oral Nightly    aspirin  325 mg Oral Daily    atorvastatin  20 mg Oral Nightly    [Held by provider] furosemide  40 mg Oral BID    gabapentin  800 mg Oral 4x Daily    metoprolol succinate  50 mg Oral Nightly    pantoprazole  40 mg Oral QAM AC    collagenase   Topical Daily    insulin lispro  8 Units Subcutaneous TID WC    insulin lispro  8 Units Subcutaneous Once       Continuous Infusions:   dextrose 100 mL/hr (06/06/19 1225)       PRN Meds:  sodium chloride flush, albuterol, naloxone, acetaminophen, docusate sodium, ondansetron, promethazine, glucose, dextrose, glucagon (rDNA), dextrose      Assessment:      46 yo woman with hx obesity, DM, neuropathy     S/p Disarticulation of digits 1-3 with transmetatarsal amputation to 4th and 5th ray, right (DOS 6/6)   Diabetic foot infection,   Osteomyelitis to right foot  -Wound culture: Enterobacter Cloacae  -MRI Call with antibiotic / infusion issues, [phone]  - Facility - any change in status, transfer out of facility or to hospital - call [phone]  - No f/u in outpatient ID office necessary. Follow-up with Podiatry.

## 2019-06-07 NOTE — PROGRESS NOTES
Occupational Therapy   Occupational Therapy Initial Assessment/Treatment  Date: 2019   Patient Name: Amos Duque  MRN: [id number]     : 1965    Date of Service: 2019    Discharge Recommendations:    Amos Duque scored a 17/24 on the AM-PAC ADL Inpatient form. Current research shows that an AM-PAC score of 17 or less is typically not associated with a discharge to the patient's home setting. Based on the patients AM-PAC score and their current ADL deficits, it is recommended that the patient have 3-5 sessions per week of Occupational Therapy at d/c to increase the patients independence. OT Equipment Recommendations  Equipment Needed: No    Assessment   Performance deficits / Impairments: Decreased functional mobility ; Decreased ADL status; Decreased endurance  Assessment: Pt presents with a decline in functional independence. Pt is NWB on Rt LE and heel weight bearing on left LE. Feel pt would benefit from further OT services. Treatment Diagnosis: Impaired functional mobility and ADL  Prognosis: Good  Decision Making: Medium Complexity  Patient Education: Role of OT:  Pt verbalized understanding  REQUIRES OT FOLLOW UP: Yes  Activity Tolerance  Activity Tolerance: Patient Tolerated treatment well  Safety Devices  Safety Devices in place: Yes  Type of devices: Left in chair;Call light within reach; Chair alarm in place;Nurse notified(daughter present)             Treatment Diagnosis: Impaired functional mobility and ADL      Restrictions  Position Activity Restriction  Other position/activity restrictions: NWB on Rt LE and heel weight bearing on left LE. Subjective   General  Chart Reviewed: Yes  Additional Pertinent Hx: Pt admitted 19 for Rt foot wound     x-ray Rt foot= Findings worrisome for osteomyelitis of the head of the fourth metacarpal. Changes of the posterior calcaneus which may be postsurgical, infectious or posttraumatic.     19 Incision and drainage, Disarticulation to the 1st, 2nd and 3rd digits,  Transmetatarsal amputation to 4th metatarsal, Further resection of the 5th ray        PMH includes: asthma, CTS, COPD, DM, ETOH abuse, HTN, blood clots, MRSA, neuropathy, pancreatitis, multiple foot surgeries, Lft toe amp  Family / Caregiver Present: Yes(daughter)  Referring Practitioner: Dr. Alivia Jean  Diagnosis: Diabetic foot ulcer Rt foot  Subjective  Subjective: Pt sitting edge of bed upon entry. Pt wants to go home at discharge, but daughter is encouraging pt to go to SNF. Pain Assessment  Pain Level: 0  Social/Functional History  Social/Functional History  Lives With: (Can have 24 hr supervision/A upon d/c)  Type of Home: House  Home Layout: Two level, Able to Live on Main level with bedroom/bathroom  Home Access: Stairs to enter with rails  Entrance Stairs - Number of Steps: 3+2+1  Entrance Stairs - Rails: Both(But can only use 1 rail at a time)  Bathroom Toilet: Standard(Can use the windowsill and shower for leverage)  Bathroom Accessibility: (States that a w/c will not fit in the bathroom)  Home Equipment: [address], [address] [facility], Rolling walker, 4 wheeled walker  ADL Assistance: Independent(Sponge bathes independently 2/2 wound care issues; Occasionally receives help from daughter; Independent with dressing)  Homemaking Assistance: (Completed by daughters)  Ambulation Assistance: Independent(used the [facility] for ambulation for the majority, but occasionally using the walker)  Transfer Assistance: [address]: Enjoys making beaded jewelry and sudoku. Additional Comments: Pt denies falls in the last 3 months.  Pt states that she has previously been given heel weight bearing restriction       Objective   Vision: Within Functional Limits  Hearing: Within functional limits    Orientation  Overall Orientation Status: Within Functional Limits     Balance  Sitting Balance: Independent  Standing Balance: Contact guard assistance  Standing Balance  Activity:

## 2019-06-07 NOTE — PROGRESS NOTES
Podiatric Surgery Daily Progress Note  Bo Katz   1 Day Post-Op      Admit Date: 5/31/2019      Hospital day #: 7                            Code:Full Code    Chief Complaint: No chief complaint on file. Subjective :   Patient seen and examined this am at the bedside. Patient denies any acute overnight events. Patient denies N/V/F/C/SOB. Patient denies calf pain, thigh pain, chest pain. Review of Systems: A 12 point review of symptoms is unremarkable with the exception of the chief complaint. Patient specifically denies nausea, fever, vomiting, chills, shortness of breath, chest pain, abdominal pain, constipation or difficulty urinating. Objective     /76   Pulse 92   Temp 98.3 °F (36.8 °C) (Oral)   Resp 18   Ht 5' 2\" (1.575 m)   Wt 190 lb (86.2 kg)   SpO2 95%   BMI 34.75 kg/m²      I/O:    Intake/Output Summary (Last 24 hours) at 6/7/2019 0958  Last data filed at 6/7/2019 0820  Gross per 24 hour   Intake 1357 ml   Output 500 ml   Net 857 ml              Wt Readings from Last 3 Encounters:   05/31/19 190 lb (86.2 kg)   03/25/19 188 lb (85.3 kg)   02/11/19 188 lb (85.3 kg)       LABS:    Recent Labs     06/06/19  2351 06/07/19  0702   WBC 11.8* 15.1*   HGB 7.6* 9.1*   HCT 23.6* 28.2*    353        Recent Labs     06/07/19  0702      K 4.7      CO2 24   BUN 39*   CREATININE 1.9*      No results for input(s): PROT, INR, APTT in the last 72 hours. LOWER EXTREMITY EXAMINATION    Dressing to b/l LE intact. No strikethrough noted to the external dressing. Scant hemorrhagic drainage noted to the internal layers of the RLE dressing. VASCULAR:  DP/PT pulses palpable 1/4 b/l. CFT is brisk to distal stump RLE, and distal brandy of all digits on the LLE. Skin temp warm to warm proximal to distal b/l LE with no focal calor noted. Mild non-pitting edema noted RLE. No streaking, no lymphangitis. NEUROLOGIC:  Gross and epicritic sensation is diminished b/l.  Protective

## 2019-06-07 NOTE — CARE COORDINATION
2019  [facility] ([facility])  Clinical Case Management Department    Spoke with patient and patient's daughter in room to review discharge plan for D/C to SNF of patient' choice. Patient and family had reviewed SNF list and prefer [facility] or [place]. SW made referrals and awaiting bed availability. Will need precert completed at SNF and will need HENS. SW will update patient and team of SNF auth and continue to assist with discharge planning. Patient: Nate Benavides  MRN: [id number] / : 1965  ACCT: [de-identified]     Admission Documentation  Attending Provider: Dedrick Herrera DPM  Admit date/time: 2019  3:21 PM  Status: Inpatient [101]  Diagnosis: Diabetic ulcer of right foot due to type 2 diabetes mellitus ([facility] [address]     Readmission within last 30 days:  no     Living Situation  Discharge Planning  Living Arrangements: Children  Support Systems: Children  Potential Assistance Needed: Home Care  Type of Home Care Services: IV Therapy, Nursing Services  Patient expects to be discharged to[de-identified] home  Expected Discharge Date: 19    Service Assessment       Values / Beliefs  Do you have any ethnic, cultural, sacramental, or spiritual Cheondoism needs you would like us to be aware of while you are in the hospital?: No    Advance Directives (For Healthcare)  Pre-existing DNR Comfort Care/DNR Arrest/DNI Order: No  Healthcare Directive: No, patient does not have an advance directive for healthcare treatment  Information on Healthcare Directives Requested: No  Patient Requests Assistance: No  Advance Directives: Pt. not interested at this time       KIMMY [address] at Discharge: SNF Physical Therapy, Occupational Therapy and Nursing 3x week  Home care at home?  No     Therapy Consults  PT evaluation needed?: Yes (Comment)  OT Evalulation Needed?: Yes (Comment)  SLP evaluation needed?: No    Home Medical Care  Patient took care of her own needs prior to admit. Pharmacy: N/A   Potential Assistance Purchasing Medications:  No  Does patient want to participate in local refill/meds to beds program?: Yes     Meds To Beds General Rules:  1. Can ONLY be done Monday- Friday between 8:30am-5pm  2. Prescription(s) must be in pharmacy by 3pm to be filled same day  3. Copy of patient's insurance/ prescription drug card and patient face sheet must be sent along with the prescription(s)  4. Cost of Rx cannot be added to hospital bill. If financial assistance is needed, please contact unit  or ;  or  CANNOT provide pharmacy voucher for patients co-pays  5. Patients can then  the prescription on their way out of the hospital at discharge, or pharmacy can deliver to the bedside if staff is available. (payment due at time of pick-up or delivery - cash, check, or card accepted)        Goals of Care  Patient expects to be discharged to[de-identified] home  Patient plans for SNF: Yes, prefers [facility] Mode of transport from hospital: TBD     Factors facilitating achievement of predicted outcomes: Family support, Motivated, Cooperative and Pleasant    Barriers to discharge: Precert authorization from insurance for SNF placement.      SAURABH Palma, [facility]  Case Management Department  Ph: [phone] Fax: [phone]

## 2019-06-07 NOTE — CONSULTS
05/25/2016    I & D left foot    OTHER SURGICAL HISTORY Right 10/20/2017    RIGHT GASTROC LENGTHENING ENDOSCOPIC, INJECTION OF AMNI GRAFT    OTHER SURGICAL HISTORY Right 04/26/2018    Diabetic foot ulcer I&D w/ integra graft application    NJ DEBRIDEMENT, SKIN, SUB-Q TISSUE,MUSCLE,BONE,=<20 SQ CM Right 8/17/2018    RIGHT FOOT DEBRIDEMENT INCISION AND DRAINAGE, PARTIAL 5TH RAY AMPUTATION performed by Mello Hopper DPM at [address] / [address]  7/7003    cryotherapy done on lesion    TOE AMPUTATION Left 02/24/2017    AMPUTATION LEFT GREAT TOE                    No family history on file. reports that she has been smoking cigarettes. She has a 30.00 pack-year smoking history. She has never used smokeless tobacco. She reports that she does not drink alcohol or use drugs.     Allergies:  Sulfa antibiotics    Current Medications:      insulin glargine (LANTUS) injection pen 30 Units BID   apixaban (ELIQUIS) tablet 5 mg BID   0.9 % sodium chloride infusion Continuous   methadone (DOLOPHINE) 10 MG/ML solution 140 mg Daily   0.9 % sodium chloride bolus Once   piperacillin-tazobactam (ZOSYN) 3.375 g in dextrose 5 % 100 mL IVPB extended infusion (mini-bag) Q8H   insulin lispro (HUMALOG) injection pen 0-18 Units TID WC   insulin lispro (HUMALOG) injection pen 0-9 Units Nightly   sodium hypochlorite (DAKINS) 0.25 % external solution Daily   lidocaine PF 1 % injection 5 mL Once   sodium chloride flush 0.9 % injection 10 mL 2 times per day   sodium chloride flush 0.9 % injection 10 mL PRN   albuterol (PROVENTIL) nebulizer solution 2.5 mg Q6H PRN   amitriptyline (ELAVIL) tablet 100 mg Nightly   aspirin EC tablet 325 mg Daily   atorvastatin (LIPITOR) tablet 20 mg Nightly   [Held by provider] furosemide (LASIX) tablet 40 mg BID   gabapentin (NEURONTIN) capsule 800 mg 4x Daily   metoprolol succinate (TOPROL XL) extended release tablet 50 mg Nightly   pantoprazole (PROTONIX) tablet 40 mg QAM tissue ulceration of the lateral foot with subcutaneous emphysema. 3. 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 Given the proximity to this ulceration this is    suspicious for early involvement of osteomyelitis. 4. Moderate edema of the cuboid with transversely oriented T1 hypointense line suspicious for stress or insufficiency fracture. 5. Moderate edema of the posterior calcaneus with fracture line of the far posterior superior calcaneus. The edema is favored to be related to primary fracture rather than representing osteomyelitis with a secondary pathologic fracture. 7. Slight edema without abnormal T1 signal of the third digit metatarsal head and proximal phalanx likely reactive. 8. Subcutaneous edema consistent with cellulitis. 9. Generalized edema of the musculature consistent with chronic small vessel ischemic changes some/denervation from diabetes. 10. Degenerative changes as above. XR FOOT RIGHT (MIN 3 VIEWS)   Final Result      Findings worrisome for osteomyelitis of the head of the fourth metacarpal.      Changes of the posterior calcaneus which may be postsurgical, infectious or posttraumatic. Please correlate clinically      US RENAL COMPLETE    (Results Pending)       Assessment/Plan     Pt is a 53 yo F with history of drug and alcohol abuse, poorly controlled DM, htn, obesity who presents with diabetic ulcers requiring right transmetatarsal amputation.     FAHEEM on CKD  - repeat urine studies  - proteinuria  - hypoperfusion  - already transfused 1U pRBCs, agree with IVF        Vol status - hypovolemia    Electrolytes - ok    Anemia - continue to monitor, no signs of ongoing losses        Thank you for allowing us to participate in care of Airam Sofia     Pt has FAHEEM sec to hemodynamic changes   Ur

## 2019-06-07 NOTE — PLAN OF CARE
Problem: Falls - Risk of:  Goal: Will remain free from falls  Description  Will remain free from falls  6/7/2019 0227 by Haley Clement RN  Outcome: Ongoing     Pt remains without falls during this shift. Pt does not attempt to get OOB alone. Pt able to call out appropriately, call light within reach. Safety precautions in place include fall armband, fall towel, chair & bed alarms, non slip foot wear. Signs posted on door, and door remains open for observation. The bed is in lowest position, wheels are locked, and rails are up 2/4. Continue with current care.

## 2019-06-07 NOTE — PROGRESS NOTES
Resident Progress Note    Admit Date: 2019    PCP: Brennan Coats MD                  : 1965  MRN: [id number]  CC: right foot pain    Subjective: Interval History:   Patient seen this morning resting comfortable in bed. Patient this morning had low blood sugar into the high 50s. Diet: Dietary Nutrition Supplements: Wound Healing Oral Supplement  DIET CARB CONTROL; Carb Control: 4 carbs/meal (approximate 1800 kcals/day)      Data:   Scheduled Meds:   insulin glargine  35 Units Subcutaneous BID    enoxaparin  40 mg Subcutaneous Daily    methadone  140 mg Oral Daily    sodium chloride  250 mL Intravenous Once    piperacillin-tazobactam  3.375 g Intravenous Q8H    insulin lispro  0-18 Units Subcutaneous TID WC    insulin lispro  0-9 Units Subcutaneous Nightly    sodium hypochlorite   Irrigation Daily    lidocaine 1 % injection  5 mL Intradermal Once    sodium chloride flush  10 mL Intravenous 2 times per day    amitriptyline  100 mg Oral Nightly    aspirin  325 mg Oral Daily    atorvastatin  20 mg Oral Nightly    [Held by provider] furosemide  40 mg Oral BID    gabapentin  800 mg Oral 4x Daily    metoprolol succinate  50 mg Oral Nightly    pantoprazole  40 mg Oral QAM AC    collagenase   Topical Daily    insulin lispro  8 Units Subcutaneous TID WC    insulin lispro  8 Units Subcutaneous Once     Continuous Infusions:   dextrose 100 mL/hr (19 1225)     PRN Meds:sodium chloride flush, albuterol, naloxone, acetaminophen, docusate sodium, ondansetron, promethazine, glucose, dextrose, glucagon (rDNA), dextrose  I/O last 3 completed shifts: In: 9958 [I.V.:1057; Blood:300]  Out: 400 [Urine:300; Blood:100]  No intake/output data recorded.     Intake/Output Summary (Last 24 hours) at 2019 0745  Last data filed at 2019 0640  Gross per 24 hour   Intake 1357 ml   Output 400 ml   Net 957 ml           Objective:     Vitals: /76   Pulse 92   Temp 98.3 °F (36.8 °C) (Oral) Resp 18   Ht 5' 2\" (1.575 m)   Wt 190 lb (86.2 kg)   SpO2 95%   BMI 34.75 kg/m²     Physical Exam   Constitutional: She is oriented to person, place, and time. She appears well-developed and well-nourished. HENT:   Head: Normocephalic and atraumatic. Eyes: Pupils are equal, round, and reactive to light. EOM are normal.   Neck: Normal range of motion. Neck supple. No JVD present. Cardiovascular: Normal rate, regular rhythm and normal heart sounds. Pulmonary/Chest: Effort normal and breath sounds normal. No stridor. No respiratory distress. She has no wheezes. Abdominal: Soft. Bowel sounds are normal. She exhibits no distension and no mass. There is no tenderness. There is no guarding. Musculoskeletal: Normal range of motion. She exhibits no edema or deformity. Neurological: She is alert and oriented to person, place, and time. No cranial nerve deficit. Skin: Skin is warm and dry. Capillary refill takes less than 2 seconds. Psychiatric: She has a normal mood and affect. LABS:    CBC:   Recent Labs     06/06/19  0620 06/06/19  2200 06/06/19  2351 06/07/19  0702   WBC 11.5*  --  11.8* 15.1*   HGB 9.2* 7.6* 7.6* 9.1*   HCT 28.8* 23.5* 23.6* 28.2*   MCV 88.0  --  88.7 88.4     --  353 353                                                                BMP:    Recent Labs     06/06/19  0620 06/06/19  2351 06/07/19  0702    137 138   K 4.6 5.3* 4.7    103 104   CO2 24 24 24   BUN 39* 35* 39*   CREATININE 1.8* 1.8* 1.9*   GLUCOSE 153* 129* 54*       LFT's: No results for input(s): AST, ALT, ALB, BILITOT, ALKPHOS in the last 72 hours. Troponin: No results for input(s): TROPONINI in the last 72 hours. BNP: No results for input(s): BNP in the last 72 hours. Lipids: No results for input(s): CHOL, HDL in the last 72 hours.     Invalid input(s): LDLCALCU    ABGs:   Lab Results   Component Value Date    PHART 7.48 07/19/2011    ERY6RTU 49 07/19/2011    PO2ART 59 07/19/2011       INR: No results for input(s): INR in the last 72 hours. U/A:No results for input(s): NITRITE, COLORU, PHUR, LABCAST, WBCUA, RBCUA, MUCUS, TRICHOMONAS, YEAST, BACTERIA, CLARITYU, SPECGRAV, LEUKOCYTESUR, UROBILINOGEN, BILIRUBINUR, BLOODU, GLUCOSEU, AMORPHOUS in the last 72 hours. Invalid input(s): Kin Sale   -----------------------------------------------------------------  RAD:   XR FOOT RIGHT (MIN 3 VIEWS)   Final Result      Postoperative changes of recent transmetatarsal amputation as described      MRI FOOT RIGHT WO CONTRAST   Final Result   Impression:   1. 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   2. Soft tissue ulceration of the lateral foot with subcutaneous emphysema. 3. 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 Given the proximity to this ulceration this is    suspicious for early involvement of osteomyelitis. 4. Moderate edema of the cuboid with transversely oriented T1 hypointense line suspicious for stress or insufficiency fracture. 5. Moderate edema of the posterior calcaneus with fracture line of the far posterior superior calcaneus. The edema is favored to be related to primary fracture rather than representing osteomyelitis with a secondary pathologic fracture. 7. Slight edema without abnormal T1 signal of the third digit metatarsal head and proximal phalanx likely reactive. 8. Subcutaneous edema consistent with cellulitis. 9. Generalized edema of the musculature consistent with chronic small vessel ischemic changes some/denervation from diabetes. 10. Degenerative changes as above.       XR FOOT RIGHT (MIN 3 VIEWS)   Final Result      Findings worrisome for osteomyelitis of the head of the fourth metacarpal.      Changes of the posterior calcaneus which may be postsurgical, infectious or posttraumatic. Please correlate clinically            Assessment/Plan:   Patient is 51 y/o female with PMHx of Diabetes, diabetic foot ulcer, HLD presenting to [facility] as a direct admit from podiatry clinic with concerns for Osteomyelitis.  Internal medicine consulted for medical management of co morbidities.      Osteomyelitis 4 metatarsal head s/p TMA POD1  - On Zosyn per Podiatry  - MRI Positive for Osteomyelitis   - POD 1 of TMA  - Abx pending ID recs     DM2  - Lantus 30 units BID for low morning blood sugars  - Premeal insulin 8 units  - Low dose sliding scale  - Hypo glycemic protocol     FAHEEM likely 2/2 volume depletion  -  Urine lytes pending  - holding lasix  - continue to monitor may require some volume     History of DVT  - restarted on Eliquis     CHFpEF compensated  - On toprol XL 50 mg  - Lasix on hold due to FAHEEM  - Nuclear stress test performed in 8-16/2018 negative for ischemia and EF 63%     Code Status: Full  FEN: NPO after midnight  PPx: Eliquis  Dispo: GMF     Plan discussed with Dr. Maya Villafana MD  6/7/2019  7:45 AM

## 2019-06-07 NOTE — PLAN OF CARE
Problem: Nutrition  Goal: Optimal nutrition therapy  6/7/2019 1220 by Charan Moses, GARETH, LD  Outcome: Completed

## 2019-06-07 NOTE — CARE COORDINATION
2019  [facility])  Clinical Case Management Department    Reviewed discharge plan with patient. Patient to discharge to [facility] via transport. Scheduled transport for 6:15pm today 19. Call report to: [phone]  Fax orders to: [phone]      Reviewed IMM form with patient, patient initialed, form filed in chart. Patient: Rosi Penaloza  MRN: [id number] / : 1965  ACCT: [de-identified]    Admission Documentation  Attending Provider: Eduardo Allen DPM  Admit date/time: 2019  3:21 PM  Status: Inpatient [101]  Diagnosis: Diabetic ulcer of right foot due to type 2 diabetes mellitus ([facility] [address]     Readmission within last 30 days:  no     Living Situation  Discharge Planning  Living Arrangements: Children  Support Systems: Children  Potential Assistance Needed: Home Care  Type of Home Care Services: IV Therapy, Nursing Services  Patient expects to be discharged to[de-identified] home  Expected Discharge Date: 19    Service Assessment:       Values / Beliefs  Do you have any ethnic, cultural, sacramental, or spiritual Holiness needs you would like us to be aware of while you are in the hospital?: No    Advance Directives (For Healthcare)  Pre-existing DNR Comfort Care/DNR Arrest/DNI Order: No  Healthcare Directive: No, patient does not have an advance directive for healthcare treatment  Information on Healthcare Directives Requested: No  Patient Requests Assistance: No  Advance Directives: Pt. not interested at this time       Pharmacy: N/A   Potential Assistance Purchasing Medications:  No  Does patient want to participate in local refill/meds to beds program?: Yes    Meds To Beds General Rules:  1. Can ONLY be done Monday- Friday between 8:30am-5pm  2. Prescription(s) must be in pharmacy by 3pm to be filled same day  3. Copy of patient's insurance/ prescription drug card and patient face sheet must be sent along with the prescription(s)  4. Cost of Rx cannot be added to hospital bill.  If financial assistance is needed, please contact unit  or ;  or  CANNOT provide pharmacy voucher for patients co-pays  5. Patients can then  the prescription on their way out of the hospital at discharge, or pharmacy can deliver to the bedside if staff is available. (payment due at time of pick-up or delivery - cash, check, or card accepted)       Notification completed in HENS/PAS? Yes     Discharge disposition: [address] Phone: [phone] Fax: [phone]       Ancillary: Patient to discharge to [facility] via transport. HENS completed. [employer] and her family were provided with choice of provider; she and her family are in agreement with the discharge plan.       Care Transitions patient: No    SAURABH Syed, [facility] [employer] [employer]  Case Management Department  Ph: [phone] Fax: [phone]

## 2019-06-07 NOTE — PROGRESS NOTES
Physical Therapy      Facility/Department: [address]  Initial Assessment/Treatment note    NAME: Georgiana Briscoe  : 1965  MRN: [id number]    Date of Service: 2019    Discharge Recommendations:Tameka Pool scored a 14/24 on the AM-PAC short mobility form. Current research shows that an AM-PAC score of 17 or less is typically not associated with a discharge to the patient's home setting. Based on the patients AM-PAC score and their current functional mobility deficits, it is recommended that the patient have 3-5 sessions per week of Physical Therapy at d/c to increase the patients independence. PT Equipment Recommendations  Equipment Needed: (defer)    Assessment   Body structures, Functions, Activity limitations: Decreased functional mobility ; Decreased endurance;Decreased strength;Decreased balance;Decreased safe awareness  Assessment: Pt is a 53 yo female who is currently functioning well below her baseline for functional mobility following B LE weigth bearing restrictions. Pt requiring physical assistance and VC for transfers, but still unable to maintain all weight bearing restrictions during. Pt would benefit from continued therapy upon d/c to increase her independence with functional mobility while maintaining weight bearing precautions. Treatment Diagnosis: Decreased independence with functional mobility  Prognosis: Good  Decision Making: Medium Complexity  Patient Education: Pt educated on PT POC, goals for hospital stay and d/c recs. Verbalized understanding. REQUIRES PT FOLLOW UP: Yes  Activity Tolerance  Activity Tolerance: Patient limited by fatigue;Patient limited by endurance       Patient Diagnosis(es): There were no encounter diagnoses.      has a past medical history of Amenorrhea, Anomalies of nails, Asthma, Athlete's foot, Bacterial vaginosis, Carpal tunnel syndrome, COPD (chronic obstructive pulmonary disease) ([facility] [address] Diabetes mellitus type II, Diabetic neuropathy ([facility] [address] DVT (deep venous thrombosis) ([facility] [address] Dyslipidemia, Dyspareunia, ETOH abuse, Feet clawing, HTN (hypertension), Hx of blood clots, Hyperlipidemia, MRSA (methicillin resistant staph aureus) culture positive, Neuropathy, Pain, back, Pain, eye, right, Pancreatitis, Pseudocyst, pancreas, Scalp lesion, Tinea pedis, Tobacco abuse, and Vaginal bleeding, abnormal.   has a past surgical history that includes  section (unknown); pre-malignant / benign skin lesion excision (7003); other surgical history (Left, 2016); Toe amputation (Left, 2017); other surgical history (Right, 10/20/2017); other surgical history (Right, 2018); pr debridement, skin, sub-q tissue,muscle,bone,=<20 sq cm (Right, 2018); Foot Debridement (Right, 2019); and Foot Debridement (Right, 2019). Restrictions  Position Activity Restriction  Other position/activity restrictions: NWB on Rt LE and heel weight bearing on left LE. Vision/Hearing  Vision: Within Functional Limits  Hearing: Within functional limits     Subjective  General  Chart Reviewed: Yes  Additional Pertinent Hx: Pt is a 53 yo female who is a direct admission from podiatry outpatient clinic due to underlying bone exposed to the Right foot, and patient not being able to have seen her PCP to be cleared for surgery this week and obtaining outpatient MRI Right foot.  RIGHT FOOT INCISION AND DRAINAGE WITH STAGING TRANSMETATARSAL AMPUTATION. Asthma, carpal tunnel, DVT, diabetic neuropathy, COPD, dyslipedmia, HTN, HLD, Pancreatitis, scalp lesion. PSH: Multiple surgeries to B feet. Family / Caregiver Present: Yes(Daughter)  Referring Practitioner: Dr. Henriquez Bad: Within Functional Limits  General Comment  Comments: Pt was sitting at the EOB upon arrival. PT agreeable to PT evaluation  Subjective  Subjective: Pt states that she was under the impression she could put weight on to her R heel.  Informed her the therapy order states NWB and educated maintain heel only weight bearing on the L, but was able to maintain NWB on the R. Max Vc and visual demonstration completed prior to attempting)  Ambulation  Ambulation?: No(NT 2/2 inability to maintain heel only weight bearing during transfers)  Stairs/Curb  Stairs?: No     Balance  Sitting - Static: Good  Sitting - Dynamic: Good  Standing - Static: Fair  Standing - Dynamic: Fair;-        Plan   Plan  Times per week: 2-5  Current Treatment Recommendations: Strengthening, Gait Training, Balance Training, Endurance Training, Transfer Training, Functional Mobility Training, Patient/Caregiver Education & Training  Safety Devices  Type of devices: Left in chair, Call light within reach, Nurse notified    G-Code       OutComes Score                                                  AM-PAC Score  AM-PAC Inpatient Mobility Raw Score : 14 (06/07/19 1041)  AM-PAC Inpatient T-Scale Score : 38.1 (06/07/19 1041)  Mobility Inpatient CMS 0-100% Score: 61.29 (06/07/19 1041)  Mobility Inpatient CMS G-Code Modifier : CL (06/07/19 1041)          Goals  Short term goals  Time Frame for Short term goals: by d/c   Short term goal 1: Pt will transfer supine <> sit independently  Short term goal 2: Pt will transfer sit <> stand with supervision and maintain all weight bearing precautions   Short term goal 3: Pt will transfer bed <> chair with supervision and maintain all weight bearing precautions   Patient Goals   Patient goals : Get stronger       Therapy Time   Individual Concurrent Group Co-treatment   Time In 0830         Time Out 0911         Minutes [address], PT    If pt d/c'd prior to next treatment, this note serves as a discharge note.

## 2019-06-07 NOTE — PROGRESS NOTES
Nutrition Assessment    Type and Reason for Visit: Reassess    Nutrition Assessment:  Patient assessed for nutritional risk. Deemed to be at low risk at this time. Will continue to monitor for changes in status. Pt is consuming % of meals on Carb Control diet, family at bedside confirms good intake as pt is sleeping (reports not much sleep overnight). Weight stable. Not consuming wound healing ONS will DC. Continue to monitor and encourage PO intake, Monitor ability to offer DM education as pt is appropriate.      Malnutrition Assessment:  · Malnutrition Status: No malnutrition    Nutrition Risk Level   Risk Level: Low    Nutrition Diagnosis:   · Problem: Increased nutrient needs  · Etiology: Increased demand for energy/nutrients    Signs and symptoms: Presence of wounds    Nutrition Intervention:  Food and/or Delivery: Continue current diet, Discontinue ONS  Nutrition Education/Counseling/Coordination of Care:  Continued Inpatient Monitoring, Education not appropriate at this time      Electronically signed by Carroll Alaniz RD, LD on 6/7/19 at 12:16 PM    Contact Number:   Pager: [phone]  Office: [phone]

## 2019-06-07 NOTE — CARE COORDINATION
Precert started for Glenn Padron [facility]. [address] [facility] [address]. Rodrigo Ham to update her. Awaiting auth and then discharge orders.     SAURABH Gibbs, LSW    [phone]    Electronically signed by SAURABH Syed, LSW on 6/7/2019 at 12:52 PM

## 2019-06-09 ENCOUNTER — APPOINTMENT (OUTPATIENT)
Dept: CT IMAGING | Age: 54
DRG: 177 | End: 2019-06-09
Payer: COMMERCIAL

## 2019-06-09 ENCOUNTER — APPOINTMENT (OUTPATIENT)
Dept: GENERAL RADIOLOGY | Age: 54
DRG: 177 | End: 2019-06-09
Payer: COMMERCIAL

## 2019-06-09 ENCOUNTER — HOSPITAL ENCOUNTER (INPATIENT)
Age: 54
LOS: 3 days | Discharge: NURSING, REHAB OR LONG-TERM CARE | DRG: 177 | End: 2019-06-12
Attending: EMERGENCY MEDICINE | Admitting: INTERNAL MEDICINE
Payer: COMMERCIAL

## 2019-06-09 DIAGNOSIS — R41.82 ALTERED MENTAL STATUS, UNSPECIFIED ALTERED MENTAL STATUS TYPE: Primary | ICD-10-CM

## 2019-06-09 DIAGNOSIS — J18.9 PNEUMONIA DUE TO ORGANISM: ICD-10-CM

## 2019-06-09 DIAGNOSIS — F11.21 OPIOID DEPENDENCE IN REMISSION (HCC): ICD-10-CM

## 2019-06-09 DIAGNOSIS — Z98.890 S/P FOOT SURGERY, RIGHT: ICD-10-CM

## 2019-06-09 PROBLEM — A41.9 SEPSIS (HCC): Status: ACTIVE | Noted: 2019-06-09

## 2019-06-09 LAB
A/G RATIO: 0.8 (ref 1.1–2.2)
ACETAMINOPHEN LEVEL: <5 UG/ML (ref 10–30)
ALBUMIN SERPL-MCNC: 3.4 G/DL (ref 3.4–5)
ALP BLD-CCNC: 603 U/L (ref 40–129)
ALT SERPL-CCNC: 16 U/L (ref 10–40)
AMMONIA: 28 UMOL/L (ref 11–51)
AMPHETAMINE SCREEN, URINE: ABNORMAL
ANION GAP SERPL CALCULATED.3IONS-SCNC: 13 MMOL/L (ref 3–16)
AST SERPL-CCNC: 16 U/L (ref 15–37)
BACTERIA: ABNORMAL /HPF
BARBITURATE SCREEN URINE: ABNORMAL
BASE EXCESS VENOUS: -1 (ref -3–3)
BASOPHILS ABSOLUTE: 0.1 K/UL (ref 0–0.2)
BASOPHILS RELATIVE PERCENT: 0.7 %
BENZODIAZEPINE SCREEN, URINE: ABNORMAL
BILIRUB SERPL-MCNC: 0.3 MG/DL (ref 0–1)
BILIRUBIN URINE: NEGATIVE
BLOOD, URINE: ABNORMAL
BUN BLDV-MCNC: 38 MG/DL (ref 7–20)
CALCIUM SERPL-MCNC: 10 MG/DL (ref 8.3–10.6)
CANNABINOID SCREEN URINE: ABNORMAL
CHLORIDE BLD-SCNC: 106 MMOL/L (ref 99–110)
CLARITY: CLEAR
CO2: 24 MMOL/L (ref 21–32)
COCAINE METABOLITE SCREEN URINE: ABNORMAL
COLOR: YELLOW
CREAT SERPL-MCNC: 1.7 MG/DL (ref 0.6–1.1)
EOSINOPHILS ABSOLUTE: 0.2 K/UL (ref 0–0.6)
EOSINOPHILS RELATIVE PERCENT: 1.5 %
EPITHELIAL CELLS, UA: ABNORMAL /HPF
GAMMA GLUTAMYL TRANSFERASE: 219 U/L (ref 5–36)
GFR AFRICAN AMERICAN: 38
GFR NON-AFRICAN AMERICAN: 31
GLOBULIN: 4.4 G/DL
GLUCOSE BLD-MCNC: 135 MG/DL (ref 70–99)
GLUCOSE BLD-MCNC: 171 MG/DL (ref 70–99)
GLUCOSE BLD-MCNC: 36 MG/DL (ref 70–99)
GLUCOSE BLD-MCNC: 90 MG/DL (ref 70–99)
GLUCOSE URINE: NEGATIVE MG/DL
HCO3 VENOUS: 24.9 MMOL/L (ref 23–29)
HCT VFR BLD CALC: 28.1 % (ref 36–48)
HEMOGLOBIN: 9.1 G/DL (ref 12–16)
KETONES, URINE: NEGATIVE MG/DL
LACTATE: <0.3 MMOL/L (ref 0.4–2)
LEUKOCYTE ESTERASE, URINE: ABNORMAL
LYMPHOCYTES ABSOLUTE: 1.9 K/UL (ref 1–5.1)
LYMPHOCYTES RELATIVE PERCENT: 11.4 %
Lab: ABNORMAL
MCH RBC QN AUTO: 28.3 PG (ref 26–34)
MCHC RBC AUTO-ENTMCNC: 32.2 G/DL (ref 31–36)
MCV RBC AUTO: 87.8 FL (ref 80–100)
METHADONE SCREEN, URINE: POSITIVE
MICROSCOPIC EXAMINATION: YES
MONOCYTES ABSOLUTE: 1 K/UL (ref 0–1.3)
MONOCYTES RELATIVE PERCENT: 6.1 %
NEUTROPHILS ABSOLUTE: 13.3 K/UL (ref 1.7–7.7)
NEUTROPHILS RELATIVE PERCENT: 80.3 %
NITRITE, URINE: NEGATIVE
O2 SAT, VEN: 74 %
OPIATE SCREEN URINE: ABNORMAL
OXYCODONE URINE: ABNORMAL
PCO2, VEN: 49.3 MM HG (ref 40–50)
PDW BLD-RTO: 16.3 % (ref 12.4–15.4)
PERFORMED ON: ABNORMAL
PERFORMED ON: NORMAL
PH UA: 6
PH UA: 6 (ref 5–8)
PH VENOUS: 7.31 (ref 7.35–7.45)
PHENCYCLIDINE SCREEN URINE: ABNORMAL
PLATELET # BLD: 409 K/UL (ref 135–450)
PMV BLD AUTO: 8 FL (ref 5–10.5)
PO2, VEN: 43 MM HG
POC SAMPLE TYPE: ABNORMAL
POTASSIUM REFLEX MAGNESIUM: 4.2 MMOL/L (ref 3.5–5.1)
PRO-BNP: 2442 PG/ML (ref 0–124)
PROPOXYPHENE SCREEN: ABNORMAL
PROTEIN UA: 100 MG/DL
RAPID INFLUENZA  B AGN: NEGATIVE
RAPID INFLUENZA A AGN: NEGATIVE
RBC # BLD: 3.2 M/UL (ref 4–5.2)
RBC UA: ABNORMAL /HPF (ref 0–2)
SALICYLATE, SERUM: 1 MG/DL (ref 15–30)
SODIUM BLD-SCNC: 143 MMOL/L (ref 136–145)
SPECIFIC GRAVITY UA: 1.02 (ref 1–1.03)
TCO2 CALC VENOUS: 26 MMOL/L
TOTAL PROTEIN: 7.8 G/DL (ref 6.4–8.2)
TROPONIN: <0.01 NG/ML
URINE TYPE: ABNORMAL
UROBILINOGEN, URINE: 0.2 E.U./DL
WBC # BLD: 16.6 K/UL (ref 4–11)
WBC UA: ABNORMAL /HPF (ref 0–5)
YEAST: PRESENT /HPF

## 2019-06-09 PROCEDURE — 87040 BLOOD CULTURE FOR BACTERIA: CPT

## 2019-06-09 PROCEDURE — 89220 SPUTUM SPECIMEN COLLECTION: CPT

## 2019-06-09 PROCEDURE — 6360000002 HC RX W HCPCS: Performed by: STUDENT IN AN ORGANIZED HEALTH CARE EDUCATION/TRAINING PROGRAM

## 2019-06-09 PROCEDURE — 83605 ASSAY OF LACTIC ACID: CPT

## 2019-06-09 PROCEDURE — 87581 M.PNEUMON DNA AMP PROBE: CPT

## 2019-06-09 PROCEDURE — 6360000002 HC RX W HCPCS

## 2019-06-09 PROCEDURE — G0480 DRUG TEST DEF 1-7 CLASSES: HCPCS

## 2019-06-09 PROCEDURE — 70450 CT HEAD/BRAIN W/O DYE: CPT

## 2019-06-09 PROCEDURE — 85025 COMPLETE CBC W/AUTO DIFF WBC: CPT

## 2019-06-09 PROCEDURE — 36415 COLL VENOUS BLD VENIPUNCTURE: CPT

## 2019-06-09 PROCEDURE — 80307 DRUG TEST PRSMV CHEM ANLYZR: CPT

## 2019-06-09 PROCEDURE — 82140 ASSAY OF AMMONIA: CPT

## 2019-06-09 PROCEDURE — 94150 VITAL CAPACITY TEST: CPT

## 2019-06-09 PROCEDURE — 99291 CRITICAL CARE FIRST HOUR: CPT

## 2019-06-09 PROCEDURE — 71045 X-RAY EXAM CHEST 1 VIEW: CPT

## 2019-06-09 PROCEDURE — 94761 N-INVAS EAR/PLS OXIMETRY MLT: CPT

## 2019-06-09 PROCEDURE — 1200000000 HC SEMI PRIVATE

## 2019-06-09 PROCEDURE — 2580000003 HC RX 258

## 2019-06-09 PROCEDURE — 6360000002 HC RX W HCPCS: Performed by: EMERGENCY MEDICINE

## 2019-06-09 PROCEDURE — 2580000003 HC RX 258: Performed by: EMERGENCY MEDICINE

## 2019-06-09 PROCEDURE — 82977 ASSAY OF GGT: CPT

## 2019-06-09 PROCEDURE — 6370000000 HC RX 637 (ALT 250 FOR IP): Performed by: STUDENT IN AN ORGANIZED HEALTH CARE EDUCATION/TRAINING PROGRAM

## 2019-06-09 PROCEDURE — 87086 URINE CULTURE/COLONY COUNT: CPT

## 2019-06-09 PROCEDURE — 87804 INFLUENZA ASSAY W/OPTIC: CPT

## 2019-06-09 PROCEDURE — 80053 COMPREHEN METABOLIC PANEL: CPT

## 2019-06-09 PROCEDURE — 96375 TX/PRO/DX INJ NEW DRUG ADDON: CPT

## 2019-06-09 PROCEDURE — 84484 ASSAY OF TROPONIN QUANT: CPT

## 2019-06-09 PROCEDURE — 81001 URINALYSIS AUTO W/SCOPE: CPT

## 2019-06-09 PROCEDURE — 93005 ELECTROCARDIOGRAM TRACING: CPT | Performed by: EMERGENCY MEDICINE

## 2019-06-09 PROCEDURE — 96365 THER/PROPH/DIAG IV INF INIT: CPT

## 2019-06-09 PROCEDURE — 82803 BLOOD GASES ANY COMBINATION: CPT

## 2019-06-09 PROCEDURE — 87486 CHLMYD PNEUM DNA AMP PROBE: CPT

## 2019-06-09 PROCEDURE — 87633 RESP VIRUS 12-25 TARGETS: CPT

## 2019-06-09 PROCEDURE — 83880 ASSAY OF NATRIURETIC PEPTIDE: CPT

## 2019-06-09 PROCEDURE — 87798 DETECT AGENT NOS DNA AMP: CPT

## 2019-06-09 PROCEDURE — 2580000003 HC RX 258: Performed by: STUDENT IN AN ORGANIZED HEALTH CARE EDUCATION/TRAINING PROGRAM

## 2019-06-09 PROCEDURE — 2700000000 HC OXYGEN THERAPY PER DAY

## 2019-06-09 RX ORDER — NICOTINE 21 MG/24HR
1 PATCH, TRANSDERMAL 24 HOURS TRANSDERMAL DAILY
Status: DISCONTINUED | OUTPATIENT
Start: 2019-06-09 | End: 2019-06-12 | Stop reason: HOSPADM

## 2019-06-09 RX ORDER — NALOXONE HYDROCHLORIDE 1 MG/ML
0.6 INJECTION INTRAMUSCULAR; INTRAVENOUS; SUBCUTANEOUS ONCE
Status: COMPLETED | OUTPATIENT
Start: 2019-06-09 | End: 2019-06-09

## 2019-06-09 RX ORDER — OMEPRAZOLE 20 MG/1
20 CAPSULE, DELAYED RELEASE ORAL EVERY MORNING
COMMUNITY
End: 2019-10-03 | Stop reason: SDUPTHER

## 2019-06-09 RX ORDER — DEXTROSE MONOHYDRATE 25 G/50ML
INJECTION, SOLUTION INTRAVENOUS
Status: COMPLETED
Start: 2019-06-09 | End: 2019-06-09

## 2019-06-09 RX ORDER — DEXTROSE MONOHYDRATE 25 G/50ML
12.5 INJECTION, SOLUTION INTRAVENOUS PRN
Status: DISCONTINUED | OUTPATIENT
Start: 2019-06-09 | End: 2019-06-12 | Stop reason: HOSPADM

## 2019-06-09 RX ORDER — DEXTROSE MONOHYDRATE 25 G/50ML
25 INJECTION, SOLUTION INTRAVENOUS ONCE
Status: COMPLETED | OUTPATIENT
Start: 2019-06-09 | End: 2019-06-09

## 2019-06-09 RX ORDER — NALOXONE HYDROCHLORIDE 0.4 MG/ML
0.06 INJECTION, SOLUTION INTRAMUSCULAR; INTRAVENOUS; SUBCUTANEOUS ONCE
Status: DISCONTINUED | OUTPATIENT
Start: 2019-06-09 | End: 2019-06-09

## 2019-06-09 RX ORDER — 0.9 % SODIUM CHLORIDE 0.9 %
3000 INTRAVENOUS SOLUTION INTRAVENOUS ONCE
Status: COMPLETED | OUTPATIENT
Start: 2019-06-09 | End: 2019-06-09

## 2019-06-09 RX ORDER — 0.9 % SODIUM CHLORIDE 0.9 %
30 INTRAVENOUS SOLUTION INTRAVENOUS ONCE
Status: DISCONTINUED | OUTPATIENT
Start: 2019-06-09 | End: 2019-06-09 | Stop reason: SDUPTHER

## 2019-06-09 RX ORDER — GABAPENTIN 600 MG/1
600 TABLET ORAL 4 TIMES DAILY
Status: DISCONTINUED | OUTPATIENT
Start: 2019-06-09 | End: 2019-06-09

## 2019-06-09 RX ORDER — ATORVASTATIN CALCIUM 20 MG/1
20 TABLET, FILM COATED ORAL NIGHTLY
Status: DISCONTINUED | OUTPATIENT
Start: 2019-06-09 | End: 2019-06-12 | Stop reason: HOSPADM

## 2019-06-09 RX ORDER — ONDANSETRON 2 MG/ML
4 INJECTION INTRAMUSCULAR; INTRAVENOUS EVERY 6 HOURS PRN
Status: DISCONTINUED | OUTPATIENT
Start: 2019-06-09 | End: 2019-06-12 | Stop reason: HOSPADM

## 2019-06-09 RX ORDER — METOPROLOL SUCCINATE 50 MG/1
50 TABLET, EXTENDED RELEASE ORAL NIGHTLY
Status: DISCONTINUED | OUTPATIENT
Start: 2019-06-09 | End: 2019-06-12 | Stop reason: HOSPADM

## 2019-06-09 RX ORDER — LINEZOLID 2 MG/ML
600 INJECTION, SOLUTION INTRAVENOUS EVERY 12 HOURS
Status: DISCONTINUED | OUTPATIENT
Start: 2019-06-09 | End: 2019-06-10

## 2019-06-09 RX ORDER — ONDANSETRON 2 MG/ML
4 INJECTION INTRAMUSCULAR; INTRAVENOUS ONCE
Status: COMPLETED | OUTPATIENT
Start: 2019-06-09 | End: 2019-06-09

## 2019-06-09 RX ORDER — METHADONE HYDROCHLORIDE 10 MG/1
140 TABLET ORAL EVERY MORNING
Status: DISCONTINUED | OUTPATIENT
Start: 2019-06-10 | End: 2019-06-10

## 2019-06-09 RX ORDER — DEXTROSE MONOHYDRATE 50 MG/ML
100 INJECTION, SOLUTION INTRAVENOUS PRN
Status: DISCONTINUED | OUTPATIENT
Start: 2019-06-09 | End: 2019-06-12 | Stop reason: HOSPADM

## 2019-06-09 RX ORDER — ASPIRIN 325 MG
325 TABLET ORAL DAILY
Status: DISCONTINUED | OUTPATIENT
Start: 2019-06-10 | End: 2019-06-10

## 2019-06-09 RX ORDER — NICOTINE POLACRILEX 4 MG
15 LOZENGE BUCCAL PRN
Status: DISCONTINUED | OUTPATIENT
Start: 2019-06-09 | End: 2019-06-12 | Stop reason: HOSPADM

## 2019-06-09 RX ORDER — SODIUM CHLORIDE 0.9 % (FLUSH) 0.9 %
10 SYRINGE (ML) INJECTION EVERY 12 HOURS SCHEDULED
Status: DISCONTINUED | OUTPATIENT
Start: 2019-06-09 | End: 2019-06-12 | Stop reason: HOSPADM

## 2019-06-09 RX ORDER — NALOXONE HYDROCHLORIDE 0.4 MG/ML
INJECTION, SOLUTION INTRAMUSCULAR; INTRAVENOUS; SUBCUTANEOUS
Status: DISPENSED
Start: 2019-06-09 | End: 2019-06-10

## 2019-06-09 RX ORDER — AMITRIPTYLINE HYDROCHLORIDE 50 MG/1
100 TABLET, FILM COATED ORAL NIGHTLY
Status: DISCONTINUED | OUTPATIENT
Start: 2019-06-09 | End: 2019-06-09

## 2019-06-09 RX ORDER — SODIUM CHLORIDE 0.9 % (FLUSH) 0.9 %
10 SYRINGE (ML) INJECTION PRN
Status: DISCONTINUED | OUTPATIENT
Start: 2019-06-09 | End: 2019-06-12 | Stop reason: HOSPADM

## 2019-06-09 RX ORDER — ALBUTEROL SULFATE 2.5 MG/3ML
2.5 SOLUTION RESPIRATORY (INHALATION)
Status: DISCONTINUED | OUTPATIENT
Start: 2019-06-10 | End: 2019-06-10

## 2019-06-09 RX ADMIN — NALOXONE HYDROCHLORIDE 0.6 MG: 1 INJECTION PARENTERAL at 16:37

## 2019-06-09 RX ADMIN — LINEZOLID 600 MG: 600 INJECTION, SOLUTION INTRAVENOUS at 23:55

## 2019-06-09 RX ADMIN — VANCOMYCIN HYDROCHLORIDE 1750 MG: 10 INJECTION, POWDER, LYOPHILIZED, FOR SOLUTION INTRAVENOUS at 18:06

## 2019-06-09 RX ADMIN — DEXTROSE 50 % IN WATER (D50W) INTRAVENOUS SYRINGE: at 15:57

## 2019-06-09 RX ADMIN — SODIUM CHLORIDE 3000 ML: 9 INJECTION, SOLUTION INTRAVENOUS at 17:13

## 2019-06-09 RX ADMIN — PIPERACILLIN SODIUM,TAZOBACTAM SODIUM 3.38 G: 3; .375 INJECTION, POWDER, FOR SOLUTION INTRAVENOUS at 16:56

## 2019-06-09 RX ADMIN — MEROPENEM 1 G: 1 INJECTION, POWDER, FOR SOLUTION INTRAVENOUS at 22:40

## 2019-06-09 RX ADMIN — DEXTROSE MONOHYDRATE: 25 INJECTION, SOLUTION INTRAVENOUS at 15:57

## 2019-06-09 RX ADMIN — ATORVASTATIN CALCIUM 20 MG: 20 TABLET, FILM COATED ORAL at 22:40

## 2019-06-09 RX ADMIN — GABAPENTIN 700 MG: 400 CAPSULE ORAL at 22:39

## 2019-06-09 RX ADMIN — APIXABAN 10 MG: 5 TABLET, FILM COATED ORAL at 22:39

## 2019-06-09 RX ADMIN — METOPROLOL SUCCINATE 50 MG: 50 TABLET, EXTENDED RELEASE ORAL at 23:44

## 2019-06-09 RX ADMIN — ONDANSETRON 4 MG: 2 INJECTION INTRAMUSCULAR; INTRAVENOUS at 16:51

## 2019-06-09 ASSESSMENT — PAIN SCALES - GENERAL: PAINLEVEL_OUTOF10: 0

## 2019-06-09 NOTE — ED NOTES
Home Med List is complete. Source of medications in list is interview of nurse Giuliana Mejia at [facility]([phone]). Patient unable to verify meds, in extreme altered mental status.      Patient's nurse at rehab unit  states she took all morning meds today. Patient is on an Eliquis starter pack, is on day 2, took 10 mg bid yesterday and 2 tablets this morning. Patient tests blood glucose twice a day, QAM and QHS. Nurse confirmed Methadone 140 mg daily and that she takes it all at 9am.      Please note:  1. Changes to pt's med list:   Humalog was 8 units tid with meals, changed to 3 units tid with meals.  Omeprazole 20mg changed from daily to [address]  PharmD Candidate 2022 6/9/2019 6:04 PM

## 2019-06-09 NOTE — ED NOTES
Pt more awake after Narcan given, Md at bedside to reassure pt.       Marilu Monroe RN  06/09/19 1751

## 2019-06-09 NOTE — ED NOTES
Lab called to draw second set of blood cultures, First set drawn By Ashly Cantu RN  06/09/19 Jesus Morse RN  06/09/19 1640

## 2019-06-09 NOTE — ED NOTES
Pt very lethargic, pt was given a sternal run and pt woke up briefly, Md Holder aware< Rn to order . 6 of сергей Iglesias RN  06/09/19 1014 Tamiko Jeong RN  06/09/19 3377

## 2019-06-09 NOTE — ED TRIAGE NOTES
Pt is at [facility] for rehab. Pt is more lethargic today. Pt takes 140 mg of methadone daily.

## 2019-06-09 NOTE — CONSULTS
Clinical Pharmacy Consult Note    Subjective/Objective:  51 yo female presenting to ED with AMS. She is being given  broad spectrum antibiotics for empiric treatment of HCAP. Pharmacy is consulted to dose Vancomycin for one-time ED dose per Dr. Soy Tompkins. Recent Labs     06/09/19  1508   BUN 38*   CREATININE 1.7*       CrCl cannot be calculated (Unknown ideal weight.). VITALS:  /62   Pulse 66   Temp 97.8 °F (36.6 °C) (Oral)   Resp 14   SpO2 93%     Assessment/Plan:  · Will order Vancomycin 1.75 g (~20 mg/kg) IV x1 for ED dose. · Please note, consult order is for ED dose only -- if patient admitted and continued on vancomycin, please consult pharmacy for continued dosing. Thank you for the consult!    Flory Narayan, PharmD, [facility]  [phone]  6/9/2019 4:36 PM

## 2019-06-09 NOTE — ED PROVIDER NOTES
smoking cigarettes. She has a 30.00 pack-year smoking history. She has never used smokeless tobacco. She reports that she does not drink alcohol or use drugs. Medications     Previous Medications    ACCU-CHEK SOFTCLIX LANCETS MISC    1 strip by Does not apply route 2 times daily Check before breakfast and before going to bed    ALBUTEROL SULFATE  (90 BASE) MCG/ACT INHALER    Inhale 2 puffs into the lungs every 6 hours as needed for Wheezing    AMITRIPTYLINE (ELAVIL) 100 MG TABLET    Take 1 tablet by mouth nightly    AMMONIUM LACTATE (LAC-HYDRIN) 12 % LOTION    Apply topically daily. AMMONIUM LACTATE (LAC-HYDRIN) 12 % LOTION    Apply topically daily. APIXABAN (ELIQUIS STARTER PACK) 5 MG TABS TABLET    Take 10 mg (2 tablets) orally twice daily for 7 days, then take 5 mg (1 tablet) orally twice daily thereafter. ASPIRIN 325 MG TABLET    Take 1 tablet by mouth daily    ATORVASTATIN (LIPITOR) 20 MG TABLET    Take 1 tablet by mouth daily    BLOOD GLUCOSE TEST STRIPS (TRUE METRIX BLOOD GLUCOSE TEST) STRIP    1 each by In Vitro route 2 times daily As needed. FUROSEMIDE (LASIX) 40 MG TABLET    Take 1 tablet by mouth 2 times daily    GABAPENTIN (NEURONTIN) 800 MG TABLET    Take 1 tablet by mouth 4 times daily for 90 days. GAUZE PADS & DRESSINGS MISC    Please dispense 4x8 guaze, kerlix, and ace    INSULIN GLARGINE (BASAGLAR KWIKPEN) 100 UNIT/ML INJECTION PEN    Inject 40 Units into the skin 2 times daily    INSULIN LISPRO (HUMALOG) 100 UNIT/ML PEN    Inject 8 Units into the skin 3 times daily (with meals)    INSULIN PEN NEEDLE 31G X 5 MM MISC    1 each by Does not apply route daily    LANCETS MISC    1 each by Does not apply route 2 times daily PHARMACY MAY SUBSTITUTE TO TRUE METRIX LANCETS    METHADONE (DOLOPHINE) 10 MG TABLET    Take 140 mg by mouth daily. Wynn Challenger     METOPROLOL SUCCINATE (TOPROL XL) 50 MG EXTENDED RELEASE TABLET    Take 1 tablet by mouth nightly    OMEPRAZOLE (PRILOSEC) 20 MG DELAYED RELEASE CAPSULE    Take 1 capsule by mouth Daily       Allergies     She is allergic to sulfa antibiotics. Physical Exam     INITIAL VITALS: BP: (!) 162/72, Temp: 97.8 °F (36.6 °C), Pulse: 91, Resp: 14, SpO2: 95 %    Physical Exam    Diagnostic Results     EKG   Sinus rhythm with no ST or T wave abnormalities normal axis normal conduction times. RADIOLOGY:  XR CHEST PORTABLE   Final Result      New patchy airspace opacities in bilateral lungs, left greater than right, may relate to multifocal pneumonia or less likely pulmonary edema.  Recommended further evaluation with CT chest.             CT head without contrast    (Results Pending)       LABS:   Results for orders placed or performed during the hospital encounter of 06/09/19   Drug screen multi urine   Result Value Ref Range    Amphetamine Screen, Urine Neg Negative <1000ng/mL    Barbiturate Screen, Ur Neg Negative <200 ng/mL    Benzodiazepine Screen, Urine Neg Negative <200 ng/mL    Cannabinoid Scrn, Ur Neg Negative <50 ng/mL    Cocaine Metabolite Screen, Urine Neg Negative <300 ng/mL    Opiate Scrn, Ur Neg Negative <300 ng/mL    PCP Screen, Urine Neg Negative <25 ng/mL    Methadone Screen, Urine POSITIVE (A) Negative <300 ng/mL    Propoxyphene Scrn, Ur Neg Negative <300 ng/mL    pH, UA 6.0     Drug Screen Comment: see below     Oxycodone Urine Neg Negative <100 ng/ml   Acetaminophen level   Result Value Ref Range    Acetaminophen Level <5 (L) 10 - 30 ug/mL   Salicylate   Result Value Ref Range    Salicylate, Serum 1.0 (L) 15.0 - 30.0 mg/dL   CBC Auto Differential   Result Value Ref Range    WBC 16.6 (H) 4.0 - 11.0 K/uL    RBC 3.20 (L) 4.00 - 5.20 M/uL    Hemoglobin 9.1 (L) 12.0 - 16.0 g/dL    Hematocrit 28.1 (L) 36.0 - 48.0 %    MCV 87.8 80.0 - 100.0 fL    MCH 28.3 26.0 - 34.0 pg    MCHC 32.2 31.0 - 36.0 g/dL    RDW 16.3 (H) 12.4 - 15.4 %    Platelets 775 772 - 526 K/uL    MPV 8.0 5.0 - 10.5 fL    Neutrophils % 80.3 %    Lymphocytes % 11.4 %    Monocytes % 6.1 %    Eosinophils % 1.5 %    Basophils % 0.7 %    Neutrophils # 13.3 (H) 1.7 - 7.7 K/uL    Lymphocytes # 1.9 1.0 - 5.1 K/uL    Monocytes # 1.0 0.0 - 1.3 K/uL    Eosinophils # 0.2 0.0 - 0.6 K/uL    Basophils # 0.1 0.0 - 0.2 K/uL   Comprehensive Metabolic Panel w/ Reflex to MG   Result Value Ref Range    Sodium 143 136 - 145 mmol/L    Potassium reflex Magnesium 4.2 3.5 - 5.1 mmol/L    Chloride 106 99 - 110 mmol/L    CO2 24 21 - 32 mmol/L    Anion Gap 13 3 - 16    Glucose 36 (LL) 70 - 99 mg/dL    BUN 38 (H) 7 - 20 mg/dL    CREATININE 1.7 (H) 0.6 - 1.1 mg/dL    GFR Non- 31 (A) >60    GFR  38 (A) >60    Calcium 10.0 8.3 - 10.6 mg/dL    Total Protein 7.8 6.4 - 8.2 g/dL    Alb 3.4 3.4 - 5.0 g/dL    Albumin/Globulin Ratio 0.8 (L) 1.1 - 2.2    Total Bilirubin 0.3 0.0 - 1.0 mg/dL    Alkaline Phosphatase 603 (H) 40 - 129 U/L    ALT 16 10 - 40 U/L    AST 16 15 - 37 U/L    Globulin 4.4 g/dL   Troponin   Result Value Ref Range    Troponin <0.01 <0.01 ng/mL   Brain Natriuretic Peptide   Result Value Ref Range    Pro-BNP 2,442 (H) 0 - 124 pg/mL   Urinalysis, reflex to microscopic   Result Value Ref Range    Color, UA Yellow Straw/Yellow    Clarity, UA Clear Clear    Glucose, Ur Negative Negative mg/dL    Bilirubin Urine Negative Negative    Ketones, Urine Negative Negative mg/dL    Specific Gravity, UA 1.020 1.005 - 1.030    Blood, Urine SMALL (A) Negative    pH, UA 6.0 5.0 - 8.0    Protein,  (A) Negative mg/dL    Urobilinogen, Urine 0.2 <2.0 E.U./dL    Nitrite, Urine Negative Negative    Leukocyte Esterase, Urine SMALL (A) Negative    Microscopic Examination YES     Urine Type Voided    Ammonia   Result Value Ref Range    Ammonia 28 11 - 51 umol/L   Microscopic Urinalysis   Result Value Ref Range    WBC, UA 10-20 (A) 0 - 5 /HPF    RBC, UA 3-5 (A) 0 - 2 /HPF    Epi Cells 10-20 /HPF    Bacteria, UA 1+ (A) /HPF    Yeast, UA Present (A) /HPF   POCT Venous   Result Value Ref Range    pH, Cristian Townsend 7.311 (L) 7.350 - 7.450    pCO2, Lavon 49.3 40.0 - 50.0 mm Hg    pO2, Lavon 43 Not Established mm Hg    HCO3, Venous 24.9 23.0 - 29.0 mmol/L    Base Excess, Lavon -1 -3 - 3    O2 Sat, Lavon 74 Not Established %    TC02 (Calc), Lavon 26 Not Established mmol/L    Lactate <0.30 (L) 0.40 - 2.00 mmol/L    Sample Type LAVON     Performed on SEE BELOW    POCT Glucose   Result Value Ref Range    POC Glucose 171 (H) 70 - 99 mg/dl    Performed on ACCU-CHEK            RECENT VITALS:  BP: 107/68,Temp: 97.8 °F (36.6 °C), Pulse: 88, Resp: 25, SpO2: 98 %     Procedures         ED Course     Nursing Notes, Past Medical Hx, Past Surgical Hx, Social Hx,Allergies, and Family Hx were reviewed. The patient was given the following medications:  Orders Placed This Encounter   Medications    dextrose 50 % solution     LAMBERT MARCH: cabinet override    dextrose 50 % IV solution    DISCONTD: naloxone (NARCAN) injection 0.06 mg    naloxone (NARCAN) injection 0.6 mg    piperacillin-tazobactam (ZOSYN) 3.375 g in dextrose 5 % 100 mL IVPB (mini-bag)    naloxone (NARCAN) 0.4 MG/ML injection     MIREILLE DAVIS: cabinet override    vancomycin (VANCOCIN) 1,750 mg in dextrose 5 % 500 mL IVPB    ondansetron (ZOFRAN) injection 4 mg    DISCONTD: 0.9 % sodium chloride bolus    0.9 % sodium chloride bolus       CONSULTS:  PHARMACY TO DOSE VANCOMYCIN  IP CONSULT TO HOSPITALIST    MEDICAL DECISIONMAKING / ASSESSMENT / Lanre Royer is a 52 y.o. female presents with altered mental status. Patient is a resident of a nursing home and is on 140 mg a day of methadone. When she arrived here very lethargic but arousable. Her chest x-ray shows patchy infiltrates and concern for pneumonia. She does have an elevated white count. I will treat patient with vancomycin and Zosyn for healthcare acquired pneumonia. Also patient was given a low dose of Narcan given the fact her pulse ox was dropping and she was having periods of apnea.   She then woke up

## 2019-06-09 NOTE — ED NOTES
Per Dr Fermin madrigal to stop Iv fluids, Md aware of BNP 2400     Tess Samayoa, [employer]  06/09/19 3889

## 2019-06-09 NOTE — H&P
 Pseudocyst, pancreas 04    Scalp lesion 2007    Tinea pedis     Tobacco abuse 2008    Vaginal bleeding, abnormal 2007       Past Surgical History:          Procedure Laterality Date     SECTION  unknown    FOOT DEBRIDEMENT Right 2019    INCISION AND DRAINAGE WITH APPLICATION OF STRAVIX GRAFT RIGHT FOOT performed by Mello Hopper DPM at [address] Right 2019    RIGHT FOOT INCISION AND DRAINAGE WITH STAGING TRANSMETATARSAL AMPUTATION performed by Mello Hopper DPM at [facility] [address] Left 2016    I & D left foot    OTHER SURGICAL HISTORY Right 10/20/2017    RIGHT GASTROC LENGTHENING ENDOSCOPIC, INJECTION OF AMNI GRAFT    OTHER SURGICAL HISTORY Right 2018    Diabetic foot ulcer I&D w/ integra graft application    NC DEBRIDEMENT, SKIN, SUB-Q TISSUE,MUSCLE,BONE,=<20 SQ CM Right 2018    RIGHT FOOT DEBRIDEMENT INCISION AND DRAINAGE, PARTIAL 5TH RAY AMPUTATION performed by Mello Hopper DPM at [address] [facility] / [address]  [address]    cryotherapy done on lesion    TOE AMPUTATION Left 2017    AMPUTATION LEFT GREAT TOE                    Medications Prior to Admission:      Prior to Admission medications    Medication Sig Start Date End Date Taking? Authorizing Provider   ammonium lactate (LAC-HYDRIN) 12 % lotion Apply topically daily. 19   Rodrick Loving DPM   gabapentin (NEURONTIN) 800 MG tablet Take 1 tablet by mouth 4 times daily for 90 days. 19  Charu Zhu DO   amitriptyline (ELAVIL) 100 MG tablet Take 1 tablet by mouth nightly 19   Melissa Santos MD   apixaban (ELIQUIS STARTER PACK) 5 MG TABS tablet Take 10 mg (2 tablets) orally twice daily for 7 days, then take 5 mg (1 tablet) orally twice daily thereafter.  19   Poonam Zarate MD   insulin glargine [facility]) 100 UNIT/ML injection pen Inject 40 Units into the skin 2 times daily 3/27/19   Jairo Border MD Maricruz   metoprolol succinate (TOPROL XL) 50 MG extended release tablet Take 1 tablet by mouth nightly 3/27/19   Leisa Fontanez MD   aspirin 325 MG tablet Take 1 tablet by mouth daily 2/19/19   Bishop Garner MD   albuterol sulfate  (90 Base) MCG/ACT inhaler Inhale 2 puffs into the lungs every 6 hours as needed for Wheezing 2/19/19   Bishop Garner MD   omeprazole (PRILOSEC) 20 MG delayed release capsule Take 1 capsule by mouth Daily 2/19/19   Bishop Garner MD   atorvastatin (LIPITOR) 20 MG tablet Take 1 tablet by mouth daily 2/19/19   Bishop Garner MD   Lancets MISC 1 each by Does not apply route 2 times daily PHARMACY MAY SUBSTITUTE TO TRUE METRIX LANCETS 2/19/19   Bishop Garner MD   Insulin Pen Needle 31G X 5 MM MISC 1 each by Does not apply route daily 2/19/19   Bishop Garner MD   furosemide (LASIX) 40 MG tablet Take 1 tablet by mouth 2 times daily 2/11/19   Bishop Garner MD   methadone (DOLOPHINE) 10 MG tablet Take 140 mg by mouth daily. Lord Bridger Thomas MD   blood glucose test strips (TRUE METRIX BLOOD GLUCOSE TEST) strip 1 each by In Vitro route 2 times daily As needed. 7/23/18   Bishop Garner MD   Gauze Pads & Dressings MISC Please dispense 4x8 guaze, kerlix, and ace 2/23/18   Ned Gu, DPM   Accu-Chek Softclix Lancets MISC 1 strip by Does not apply route 2 times daily Check before breakfast and before going to bed 4/20/17   Bishop Garner MD       Allergies:  Sulfa antibiotics    Social History:      The patient currently lives at [facility]. TOBACCO:   reports that she has been smoking cigarettes. She has a 30.00 pack-year smoking history. She has never used smokeless tobacco.  ETOH:   reports that she does not drink alcohol. Family History:      History reviewed. No pertinent family history. REVIEW OF SYSTEMS:   Pertinent positives as noted in the HPI. All other systems reviewed and negative.   ROS: negative except as above    PHYSICAL EXAM PERFORMED:    BP (!) 151/66   Pulse 80   Temp 97.8 °F (36.6 °C) (Oral)   Resp 13   Ht 5' 1\" (1.549 m)   Wt 205 lb (93 kg)   SpO2 93%   BMI 38.73 kg/m²     General appearance:  No apparent distress. HEENT:  Normal cephalic, atraumatic without obvious deformity. PERRL. Extra ocular muscles intact. Conjunctivae/corneas clear. Neck: Supple. No JVD. Trachea midline. Respiratory:  Normal respiratory effort. CTAB w/o r/r/w. Cardiovascular:  RRR; nl S1&S2; w/o m/r/g. Abdomen: Soft, nt/nd w/ normal bowel sounds. Musculoskeletal:  No clubbing, cyanosis or edema bilaterally. Rt foot amputation of digits. Both feet wrapped in ACE bandage. Skin: Skin color, texture, turgor normal.  No rashes or lesions. Neurologic:  Neurovascularly intact without any focal sensory/motor deficits. Cranial nerves: II-XII intact, grossly non-focal.  Psychiatric:  Alert and oriented, thought content appropriate, normal insight  Capillary Refill: Brisk,< 3 seconds   Peripheral Pulses: +2 palpable, equal bilaterally       Labs:     Recent Labs     06/06/19  2351 06/07/19  0702 06/09/19  1508   WBC 11.8* 15.1* 16.6*   HGB 7.6* 9.1* 9.1*   HCT 23.6* 28.2* 28.1*    353 409     Recent Labs     06/06/19  2351 06/07/19  0702 06/09/19  1508    138 143   K 5.3* 4.7 4.2    104 106   CO2 24 24 24   BUN 35* 39* 38*   CREATININE 1.8* 1.9* 1.7*   CALCIUM 8.5 8.8 10.0     Recent Labs     06/09/19  1508   AST 16   ALT 16   BILITOT 0.3   ALKPHOS 603*     No results for input(s): INR in the last 72 hours.   Recent Labs     06/09/19  1508   TROPONINI <0.01       Urinalysis:      Lab Results   Component Value Date    NITRU Negative 06/09/2019    WBCUA 10-20 06/09/2019    BACTERIA 1+ 06/09/2019    RBCUA 3-5 06/09/2019    BLOODU SMALL 06/09/2019    SPECGRAV 1.020 06/09/2019    GLUCOSEU Negative 06/09/2019       Radiology:     XR CHEST PORTABLE   Final Result      New patchy airspace opacities in bilateral lungs, left greater than right, may relate to multifocal pneumonia or less likely pulmonary edema. Recommended further evaluation with CT chest.             CT head without contrast    (Results Pending)       ASSESSMENT & PLAN:  Adelso Feng is a 52 y.o. female w/ Hx of COPD, T2DM, Diabetic neuropathy, DVT, HLD, HTN, IVDU & EtOH abuse and pancreatitis; w/ recent dc from [facility] s/p 4th distal resection of digits on R foot and 4th and 5th MT resection, presents from nursing home more lethargic than usual    Healthcare acquired PNA  Recent d/c from [facility] 6/7 on Invanz 1 gm through 7/3/19 for osteomyelitis (on Zosyn during admission). Elevated WBC at 16.6. CXR concerning for patchy airspace opacities in bilateral lungs (L>R), multifocal pneumonia vs pulmonary edema. - start meropenem 1g IV Q8H; Linezolid 600 IV Q12H  - consult ID  - BCx x2; daily CBC/BMP  - legionella, strep, influenza, respiratory panel pending    AMS 2/2 opiate use  Presents lethargic - arousable to sternal rub. Improves after 0.6 mg narcan.   - cont methadone 140 mg QAC  - narcan PRN    Osteomyelitis 4 metatarsal head s/p TMA  - ID consulted  - consult podiatry     DM2  - Lantus 30 units BID for low morning blood sugars  - Premeal insulin 8 units  - Low dose sliding scale  - Hypoglycemic protocol  - cont neurontin 800 mg QiH  - nebulizer Q4H    History of DVT  - cont 5 mg Eliquis BID     HFpEF compensated  - On toprol XL 50 mg QHS  - Lasix on hold due to FAHEEM  - Nuclear stress test performed in 8-16/2018 negative for ischemia and EF 63%    HLD  - cont 20 mg lipitor QD    Depression  - cont amitriptyline    Nicotine use  - nicotine patches QD    DVT Prophylaxis: eliquis  Diet: No diet orders on file  Code Status: Prior    Dispo - [facility]       Dawna Hameed MD    I will discuss the patient with the senior resident and Amari Marsh MD  Internal Medicine Resident, PGY-1  Pager: [phone]    I certify that Adelso Feng is expected to be hospitalized for 2 midnights based on the following assessment and plan:      Patient seen and examined, plan of care discussed with residents. Agree with their assessment and plan with following addendum:  53 y/o female with medical h/o DM, CHF, recurrent infections of lower extremities, recent transmetatarsal amputation of right leg, comes in with altered mental status. She returned to baseline mentation after narcan in ER. Daughters state that patient continues to have fevers since discharge despite being on invanz. She also continues to require oxygen which is not her baseline. CXR is abnormal, multifocal pneumonia and possibly mild fluid overload present. Will broaden antibiotic coverage to include HCAP. Consult ID and nephrology.        Amado Tom

## 2019-06-09 NOTE — PROGRESS NOTES
Meropenem 1 g IV q8h ordered for patient. This medication is renally eliminated. Will change to meropenem 1 g IV q12h per renal dose adjustment policy. Estimated Creatinine Clearance: 39 mL/min (A) (based on SCr of 1.7 mg/dL (H)). Pharmacy will continue to monitor renal function and adjust dose as necessary. Please call with any questions. Thanks!   Lulu Hong, PharmD, [facility]  [phone]  6/9/2019 7:40 PM

## 2019-06-10 PROBLEM — J69.0 ASPIRATION PNEUMONITIS (HCC): Status: ACTIVE | Noted: 2019-06-10

## 2019-06-10 LAB
ANION GAP SERPL CALCULATED.3IONS-SCNC: 9 MMOL/L (ref 3–16)
BASOPHILS ABSOLUTE: 0.1 K/UL (ref 0–0.2)
BASOPHILS RELATIVE PERCENT: 0.5 %
BLOOD BANK DISPENSE STATUS: NORMAL
BLOOD BANK DISPENSE STATUS: NORMAL
BLOOD BANK PRODUCT CODE: NORMAL
BLOOD BANK PRODUCT CODE: NORMAL
BPU ID: NORMAL
BPU ID: NORMAL
BUN BLDV-MCNC: 36 MG/DL (ref 7–20)
CALCIUM SERPL-MCNC: 9.1 MG/DL (ref 8.3–10.6)
CHLORIDE BLD-SCNC: 107 MMOL/L (ref 99–110)
CO2: 26 MMOL/L (ref 21–32)
CREAT SERPL-MCNC: 1.4 MG/DL (ref 0.6–1.1)
CREATININE URINE: 143.7 MG/DL (ref 28–259)
DESCRIPTION BLOOD BANK: NORMAL
DESCRIPTION BLOOD BANK: NORMAL
EKG ATRIAL RATE: 87 BPM
EKG DIAGNOSIS: NORMAL
EKG P AXIS: 41 DEGREES
EKG P-R INTERVAL: 154 MS
EKG Q-T INTERVAL: 368 MS
EKG QRS DURATION: 92 MS
EKG QTC CALCULATION (BAZETT): 442 MS
EKG R AXIS: 6 DEGREES
EKG T AXIS: 34 DEGREES
EKG VENTRICULAR RATE: 87 BPM
EOSINOPHILS ABSOLUTE: 0.2 K/UL (ref 0–0.6)
EOSINOPHILS RELATIVE PERCENT: 2 %
GFR AFRICAN AMERICAN: 47
GFR NON-AFRICAN AMERICAN: 39
GLUCOSE BLD-MCNC: 116 MG/DL (ref 70–99)
GLUCOSE BLD-MCNC: 142 MG/DL (ref 70–99)
GLUCOSE BLD-MCNC: 143 MG/DL (ref 70–99)
GLUCOSE BLD-MCNC: 97 MG/DL (ref 70–99)
GLUCOSE BLD-MCNC: 99 MG/DL (ref 70–99)
HCT VFR BLD CALC: 24.5 % (ref 36–48)
HCT VFR BLD CALC: 25.5 % (ref 36–48)
HEMOGLOBIN: 7.9 G/DL (ref 12–16)
HEMOGLOBIN: 8.1 G/DL (ref 12–16)
LYMPHOCYTES ABSOLUTE: 2.2 K/UL (ref 1–5.1)
LYMPHOCYTES RELATIVE PERCENT: 18.6 %
MCH RBC QN AUTO: 28.5 PG (ref 26–34)
MCHC RBC AUTO-ENTMCNC: 32.1 G/DL (ref 31–36)
MCV RBC AUTO: 88.6 FL (ref 80–100)
MONOCYTES ABSOLUTE: 0.6 K/UL (ref 0–1.3)
MONOCYTES RELATIVE PERCENT: 5.3 %
NEUTROPHILS ABSOLUTE: 8.9 K/UL (ref 1.7–7.7)
NEUTROPHILS RELATIVE PERCENT: 73.6 %
OCCULT BLOOD DIAGNOSTIC: NORMAL
PDW BLD-RTO: 16.4 % (ref 12.4–15.4)
PERFORMED ON: ABNORMAL
PERFORMED ON: ABNORMAL
PERFORMED ON: NORMAL
PERFORMED ON: NORMAL
PLATELET # BLD: 383 K/UL (ref 135–450)
PMV BLD AUTO: 8.3 FL (ref 5–10.5)
POTASSIUM REFLEX MAGNESIUM: 4.4 MMOL/L (ref 3.5–5.1)
PROTEIN PROTEIN: 234.2 MG/DL
RBC # BLD: 2.76 M/UL (ref 4–5.2)
REPORT: NORMAL
RESPIRATORY PANEL PCR: NORMAL
SODIUM BLD-SCNC: 142 MMOL/L (ref 136–145)
WBC # BLD: 12.1 K/UL (ref 4–11)

## 2019-06-10 PROCEDURE — 6370000000 HC RX 637 (ALT 250 FOR IP): Performed by: STUDENT IN AN ORGANIZED HEALTH CARE EDUCATION/TRAINING PROGRAM

## 2019-06-10 PROCEDURE — 97161 PT EVAL LOW COMPLEX 20 MIN: CPT

## 2019-06-10 PROCEDURE — 99223 1ST HOSP IP/OBS HIGH 75: CPT | Performed by: INTERNAL MEDICINE

## 2019-06-10 PROCEDURE — 6360000002 HC RX W HCPCS: Performed by: STUDENT IN AN ORGANIZED HEALTH CARE EDUCATION/TRAINING PROGRAM

## 2019-06-10 PROCEDURE — 2580000003 HC RX 258: Performed by: STUDENT IN AN ORGANIZED HEALTH CARE EDUCATION/TRAINING PROGRAM

## 2019-06-10 PROCEDURE — 80048 BASIC METABOLIC PNL TOTAL CA: CPT

## 2019-06-10 PROCEDURE — G0328 FECAL BLOOD SCRN IMMUNOASSAY: HCPCS

## 2019-06-10 PROCEDURE — 82570 ASSAY OF URINE CREATININE: CPT

## 2019-06-10 PROCEDURE — 97116 GAIT TRAINING THERAPY: CPT

## 2019-06-10 PROCEDURE — 85014 HEMATOCRIT: CPT

## 2019-06-10 PROCEDURE — 97535 SELF CARE MNGMENT TRAINING: CPT

## 2019-06-10 PROCEDURE — 97166 OT EVAL MOD COMPLEX 45 MIN: CPT

## 2019-06-10 PROCEDURE — 1200000000 HC SEMI PRIVATE

## 2019-06-10 PROCEDURE — 97530 THERAPEUTIC ACTIVITIES: CPT

## 2019-06-10 PROCEDURE — 85018 HEMOGLOBIN: CPT

## 2019-06-10 PROCEDURE — 36415 COLL VENOUS BLD VENIPUNCTURE: CPT

## 2019-06-10 PROCEDURE — 6370000000 HC RX 637 (ALT 250 FOR IP): Performed by: INTERNAL MEDICINE

## 2019-06-10 PROCEDURE — 84156 ASSAY OF PROTEIN URINE: CPT

## 2019-06-10 PROCEDURE — 85025 COMPLETE CBC W/AUTO DIFF WBC: CPT

## 2019-06-10 RX ORDER — METHADONE HYDROCHLORIDE 10 MG/1
120 TABLET ORAL EVERY MORNING
Status: DISCONTINUED | OUTPATIENT
Start: 2019-06-11 | End: 2019-06-12 | Stop reason: HOSPADM

## 2019-06-10 RX ORDER — NYSTATIN 100000 [USP'U]/G
POWDER TOPICAL 2 TIMES DAILY
COMMUNITY
End: 2020-04-14 | Stop reason: SDUPTHER

## 2019-06-10 RX ORDER — GABAPENTIN 400 MG/1
400 CAPSULE ORAL 2 TIMES DAILY
Status: DISCONTINUED | OUTPATIENT
Start: 2019-06-10 | End: 2019-06-12 | Stop reason: HOSPADM

## 2019-06-10 RX ORDER — ALBUTEROL SULFATE 2.5 MG/3ML
2.5 SOLUTION RESPIRATORY (INHALATION) EVERY 4 HOURS PRN
Status: DISCONTINUED | OUTPATIENT
Start: 2019-06-10 | End: 2019-06-12 | Stop reason: HOSPADM

## 2019-06-10 RX ADMIN — Medication 10 ML: at 09:07

## 2019-06-10 RX ADMIN — INSULIN GLARGINE 30 UNITS: 100 INJECTION, SOLUTION SUBCUTANEOUS at 09:19

## 2019-06-10 RX ADMIN — GABAPENTIN 400 MG: 400 CAPSULE ORAL at 22:13

## 2019-06-10 RX ADMIN — APIXABAN 10 MG: 5 TABLET, FILM COATED ORAL at 09:06

## 2019-06-10 RX ADMIN — ATORVASTATIN CALCIUM 20 MG: 20 TABLET, FILM COATED ORAL at 22:13

## 2019-06-10 RX ADMIN — LINEZOLID 600 MG: 600 INJECTION, SOLUTION INTRAVENOUS at 09:06

## 2019-06-10 RX ADMIN — MEROPENEM 1 G: 1 INJECTION, POWDER, FOR SOLUTION INTRAVENOUS at 09:05

## 2019-06-10 RX ADMIN — METOPROLOL SUCCINATE 50 MG: 50 TABLET, EXTENDED RELEASE ORAL at 22:13

## 2019-06-10 RX ADMIN — GABAPENTIN 700 MG: 400 CAPSULE ORAL at 09:06

## 2019-06-10 RX ADMIN — MEROPENEM 1 G: 1 INJECTION, POWDER, FOR SOLUTION INTRAVENOUS at 22:14

## 2019-06-10 RX ADMIN — APIXABAN 10 MG: 5 TABLET, FILM COATED ORAL at 22:14

## 2019-06-10 RX ADMIN — ASPIRIN 325 MG ORAL TABLET 325 MG: 325 PILL ORAL at 09:06

## 2019-06-10 RX ADMIN — INSULIN LISPRO 3 UNITS: 100 INJECTION, SOLUTION INTRAVENOUS; SUBCUTANEOUS at 09:17

## 2019-06-10 RX ADMIN — INSULIN LISPRO 1 UNITS: 100 INJECTION, SOLUTION INTRAVENOUS; SUBCUTANEOUS at 09:18

## 2019-06-10 RX ADMIN — METHADONE HYDROCHLORIDE 140 MG: 10 TABLET ORAL at 09:14

## 2019-06-10 RX ADMIN — Medication 10 ML: at 22:18

## 2019-06-10 ASSESSMENT — PAIN SCALES - GENERAL
PAINLEVEL_OUTOF10: 3
PAINLEVEL_OUTOF10: 0

## 2019-06-10 ASSESSMENT — PAIN DESCRIPTION - PROGRESSION: CLINICAL_PROGRESSION: NOT CHANGED

## 2019-06-10 ASSESSMENT — PAIN DESCRIPTION - LOCATION: LOCATION: FOOT

## 2019-06-10 ASSESSMENT — PAIN - FUNCTIONAL ASSESSMENT: PAIN_FUNCTIONAL_ASSESSMENT: PREVENTS OR INTERFERES SOME ACTIVE ACTIVITIES AND ADLS

## 2019-06-10 ASSESSMENT — PAIN DESCRIPTION - ONSET: ONSET: ON-GOING

## 2019-06-10 ASSESSMENT — PAIN DESCRIPTION - FREQUENCY: FREQUENCY: CONTINUOUS

## 2019-06-10 ASSESSMENT — PAIN DESCRIPTION - PAIN TYPE: TYPE: CHRONIC PAIN

## 2019-06-10 ASSESSMENT — PAIN DESCRIPTION - DESCRIPTORS: DESCRIPTORS: ACHING

## 2019-06-10 ASSESSMENT — PAIN DESCRIPTION - ORIENTATION: ORIENTATION: RIGHT;LEFT

## 2019-06-10 NOTE — PROGRESS NOTES
Nephrology Consult Note                                                                                                                                                                                      Nguyen Marin MD                                                                                            FASN FNKF                                                                                                                                                                                   Office : [phone]     Fax :[phone]              Patient's Name: Virginia Hoyt  11:32 AM  6/10/2019    UO Is good   Cr better           Past Medical History:   Diagnosis Date    Amenorrhea     Anomalies of nails     Asthma 04    Athlete's foot 2010    Bacterial vaginosis 2008    Carpal tunnel syndrome 2007    [facility]     Diabetes mellitus type II 2007    Diabetic neuropathy ([facility] 8/10    [facility] 3[facility]    Dyslipidemia 2009    Dyspareunia 2009    ETOH abuse 3/04/2007    Feet clawing     HTN (hypertension)     Hx of blood clots     Hyperlipidemia     MRSA (methicillin resistant staph aureus) culture positive 2017; 2017    foot; leg     Neuropathy 2009    polyneuropathy    Pain, back 2008    Pain, eye, right 04    Pancreatitis 04    Pseudocyst, pancreas 04    Scalp lesion 2007    Tinea pedis     Tobacco abuse 2008    Vaginal bleeding, abnormal 2007       Past Surgical History:   Procedure Laterality Date     SECTION  unknown    FOOT DEBRIDEMENT Right 2019    INCISION AND DRAINAGE WITH APPLICATION OF STRAVIX GRAFT RIGHT FOOT performed by Mary Bowman DPM at [facility] Right 2019    RIGHT FOOT INCISION AND DRAINAGE WITH STAGING TRANSMETATARSAL AMPUTATION performed by Mary Bowman (HUMALOG) injection pen 3 Units TID WC   apixaban (ELIQUIS) tablet 10 mg BID   Followed by    Tico Ny ON 6/15/2019] apixaban (ELIQUIS) tablet 5 mg BID       Physical exam:     Vitals:  BP (!) 147/74   Pulse 86   Temp 99.1 °F (37.3 °C) (Oral)   Resp 16   Ht 5' 2\" (1.575 m)   Wt 201 lb 11.5 oz (91.5 kg)   SpO2 93%   BMI 36.90 kg/m²   Constitutional:  AA  Skin: no rash, turgor wnl  Heent:  eomi, mmm  Neck: no bruits or jvd noted  Cardiovascular:  S1, S2 without m/r/g  Respiratory: CTA B without w/r/r  Abdomen:  +bs, soft, nt, nd  Ext: no lower extremity edema  Psychiatric: mood and affect appropriate  Musculoskeletal:  Rom, muscular strength intact    Data:   Labs:  CBC:   Recent Labs     06/09/19  1508 06/10/19  0536   WBC 16.6* 12.1*   HGB 9.1* 7.9*    383     BMP:    Recent Labs     06/09/19  1508 06/10/19  0536    142   K 4.2 4.4    107   CO2 24 26   BUN 38* 36*   CREATININE 1.7* 1.4*   GLUCOSE 36* 143*     Ca/Mg/Phos:   Recent Labs     06/09/19  1508 06/10/19  0536   CALCIUM 10.0 9.1     Hepatic:   Recent Labs     06/09/19  1508   AST 16   ALT 16   BILITOT 0.3   ALKPHOS 603*     Troponin:   Recent Labs     06/09/19  1508   TROPONINI <0.01     BNP: No results for input(s): BNP in the last 72 hours. Lipids: No results for input(s): CHOL, TRIG, HDL, LDLCALC, LABVLDL in the last 72 hours. ABGs: No results for input(s): PHART, PO2ART, MIH4QOJ in the last 72 hours. INR: No results for input(s): INR in the last 72 hours.   UA:  Recent Labs     06/09/19  1543   COLORU Yellow   CLARITYU Clear   GLUCOSEU Negative   BILIRUBINUR Negative   KETUA Negative   SPECGRAV 1.020   BLOODU SMALL*   PHUR 6.0  6.0   PROTEINU 100*   UROBILINOGEN 0.2   NITRU Negative   LEUKOCYTESUR SMALL*   LABMICR YES   URINETYPE Voided      Urine Microscopic:   Recent Labs     06/09/19  1543   BACTERIA 1+*   WBCUA 10-20*   RBCUA 3-5*   EPIU 10-20     Urine Culture:   Recent Labs     06/09/19  1524   LABURIN No growth to date

## 2019-06-10 NOTE — PROGRESS NOTES
education:  Good  Is patient being placed on Home Treatment Regimen? YES     Does the patient have everything they need prior to discharge? NA     Comments: PT SLEEPY, ANSWERS QUESTIONS APPROPRIATELY, NO RESP DISTRESS    Plan of Care: ALBUTEROL PRN    Electronically signed by Francisco Bell RCP on 6/9/2019 at 10:06 PM    Respiratory Protocol Guidelines     1. Assessment and treatment by Respiratory Therapy will be initiated for medication and therapeutic interventions upon initiation of aerosolized medication. 2. Physician will be contacted for respiratory rate (RR) greater than 35 breaths per minute. Therapy will be held for heart rate (HR) greater than 140 beats per minute, pending direction from physician. 3. Bronchodilators will be administered via Metered Dose Inhaler (MDI) with spacer when the following criteria are met:  a. Alert and cooperative     b. HR < 140 bpm  c. RR < 30 bpm                d. Can demonstrate a 2-3 second inspiratory hold  4. Bronchodilators will be administered via Hand Held Nebulizer [facility]) to patients when ANY of the following criteria are met  a. Incognizant or uncooperative          b. Patients treated with HHN at Home        c. Unable to demonstrate proper use of MDI with spacer     d. RR > 30 bpm   5. Bronchodilators will be delivered via Metered Dose Inhaler (MDI), HHN, Aerogen to intubated patients on mechanical ventilation. 6. Inhalation medication orders will be delivered and/or substituted as outlined below. Aerosolized Medications Ordering and Administration Guidelines:    1. All Medications will be ordered by a physician, and their frequency and/or modality will be adjusted as defined by the patients Respiratory Severity Index (RSI) score.   2. If the patient does not have documented COPD, consider discontinuing anticholinergics when RSI is less than 9.  3. If the bronchospasm worsens (increased RSI), then the bronchodilator frequency can be increased to a maximum of every 4 hours. If greater than every 4 hours is required, the physician will be contacted. 4. If the bronchospasm improves, the frequency of the bronchodilator can be decreased, based on the patient's RSI, but not less than home treatment regimen frequency. 5. Bronchodilator(s) will be discontinued if patient has a RSI less than 9 and has received no scheduled or as needed treatment for 72  Hrs. Patients Ordered on a Mucolytic Agent:    1. Must always be administered with a bronchodilator. 2. Discontinue if patient experiences worsened bronchospasm, or secretions have lessened to the point that the patient is able to clear them with a cough. Anti-inflammatory and Combination Medications:    1. If the patient lacks prior history of lung disease, is not using inhaled anti-inflammatory medication at home, and lacks wheezing by examination or by history for at least 24 hours, contact physician for possible discontinuation.

## 2019-06-10 NOTE — CONSULTS
Infectious Diseases Inpatient Consult Note    Reason for Consult:   R DM foot infection  Requesting Physician:   Dr Sierra Mclean  Primary Care Physician:  Eric Ventura MD  History Obtained From:   Pt, [facility]    Admit Date: 6/9/2019  Hospital Day: 2    CHIEF COMPLAINT:       Chief Complaint   Patient presents with    Altered Mental Status     Pt at [facility] for rehab and they reports she is more lethargic today. Pt akes 140mg of methadone daily. HISTORY OF PRESENT ILLNESS:      44 yo woman with hx obesity, DM, neuropathy. Hx L partial hallux amp 2/2017 (cult + MRSA, E faecalis)  Hx R partial 5th ray resection 8/17/18, cult E cloacae, Ecoli  Hx R foot wound infection 1/2019, cult + E faecalis, Ps aeruginosa, C albicans  Hx R foot wound infection 3/2019, cult + GBS     Admit 5/31 - seen at [facility] and referred to [facility] for admission. R foot wound with exposed 4th MT head. Wound worse over time with bloody drain and pain per pt  Afebile, WBC 9.4, cult light growth E cloacae. Started on vancomycin + zosyn  MRI with 4th MT and 4th prox phalanx destruction. R foot, remaining toes resected  6/6/19. Discharged to 6/7/19 to [facility] on iv ertapenem    Presents 6/9/19 with lethargy / unable to arouse. Admit 6/9 - afebrile, WBC 16.6. CXR with patchy infiltrated. BC sent  Given narcan in ED. Pt woke up. Started on vancomycin + zosyn    Today, pt is awake and alert. Tm 99.2.   WBC 12.1    Past Medical History:    Past Medical History:   Diagnosis Date    Amenorrhea     Anomalies of nails     Asthma 5/14/04    Athlete's foot 8/2010    Bacterial vaginosis 04/2008    Carpal tunnel syndrome 5/2007    COPD [facility]) [facility]     Diabetes mellitus type II 08/2007    Diabetic neuropathy ([facility] 8/10    DVT (deep venous thrombosis) ([facility] 3/2004    Dyslipidemia 5/2009    Dyspareunia 05/2009    ETOH abuse 3/04/2007    Feet clawing     HTN (hypertension)     Hx of blood clots     Hyperlipidemia     MRSA (methicillin resistant staph aureus) culture positive 2017; 2017    foot; leg     Neuropathy 2009    polyneuropathy    Pain, back 2008    Pain, eye, right 04    Pancreatitis 04    Pseudocyst, pancreas 04    Scalp lesion 2007    Tinea pedis     Tobacco abuse 2008    Vaginal bleeding, abnormal 2007       Past Surgical History:    Past Surgical History:   Procedure Laterality Date     SECTION  unknown    FOOT DEBRIDEMENT Right 2019    INCISION AND DRAINAGE WITH APPLICATION OF STRAVIX GRAFT RIGHT FOOT performed by Whitney Sanz DPM at [address] Right 2019    RIGHT FOOT INCISION AND DRAINAGE WITH STAGING TRANSMETATARSAL AMPUTATION performed by Whitney Sanz DPM at [address] Left 2016    I & D left foot    OTHER SURGICAL HISTORY Right 10/20/2017    RIGHT GASTROC LENGTHENING ENDOSCOPIC, INJECTION OF AMNI GRAFT    OTHER SURGICAL HISTORY Right 2018    Diabetic foot ulcer I&D w/ integra graft application    DE DEBRIDEMENT, SKIN, SUB-Q TISSUE,MUSCLE,BONE,=<20 SQ CM Right 2018    RIGHT FOOT DEBRIDEMENT INCISION AND DRAINAGE, PARTIAL 5TH RAY AMPUTATION performed by Whitney Sanz DPM at [address] [facility] / [address]  [address]    cryotherapy done on lesion    TOE AMPUTATION Left 2017    AMPUTATION LEFT GREAT TOE                    Current Medications:     gabapentin  400 mg Oral BID    [START ON 2019] methadone  120 mg Oral QAM    [START ON 2019] insulin glargine  40 Units Subcutaneous Daily    atorvastatin  20 mg Oral Nightly    metoprolol succinate  50 mg Oral Nightly    sodium chloride flush  10 mL Intravenous 2 times per day    nicotine  1 patch Transdermal Daily    insulin lispro  0-6 Units Subcutaneous TID WC    insulin lispro  0-3 Units Subcutaneous Nightly    meropenem  1 g Intravenous Q12H  apixaban  10 mg Oral BID    Followed by   oGlden Alejo ON 6/15/2019] apixaban  5 mg Oral BID       Allergies:  Sulfa antibiotics    Social History:    TOBACCO:    + cig  ETOH:    None   DRUGS:   Past polysub use  MARITAL STATUS:      OCCUPATION:   None     Patient lives with daughter     Family History:   No immunodeficiency    REVIEW OF SYSTEMS:    No fever / chills / sweats. No weight loss. No visual change, eye pain, eye discharge. No oral lesion, sore throat, dysphagia. Denies cough / sputum. Denies chest pain, palpitations. Denies n / v / abd pain. No diarrhea. Denies dysuria or change in urinary function. Denies joint swelling or pain. No myalgia, arthralgia. Denies skin changes, itching  Denies focal weakness, sensory change or other neurologic symptom    Denies new / worse depression, psychiatric symptoms  Denies any Endocrine or Hematologic symptoms    PHYSICAL EXAM:      Vitals:    /67   Pulse 64   Temp 98.4 °F (36.9 °C) (Oral)   Resp 12   Ht 5' 2\" (1.575 m)   Wt 201 lb 11.5 oz (91.5 kg)   SpO2 93%   BMI 36.90 kg/m²     GENERAL: No apparent distress.     HEENT: Membranes moist, no oral lesion  NECK:  Supple  LUNGS: Clear b/l, no rales, no dullness  CARDIAC: RRR, no murmur appreciated  ABD:  + BS, soft / NT  EXT:  No rash, no edema, no lesions - R foot with dressing (saw picture in Podiatry note)  NEURO: No focal neurologic findings  PSYCH: Orientation, sensorium, mood normal  LINES:  Peripheral iv    DATA:    Lab Results   Component Value Date    WBC 12.1 (H) 06/10/2019    HGB 8.1 (L) 06/10/2019    HCT 25.5 (L) 06/10/2019    MCV 88.6 06/10/2019     06/10/2019     Lab Results   Component Value Date    CREATININE 1.4 (H) 06/10/2019    BUN 36 (H) 06/10/2019     06/10/2019    K 4.4 06/10/2019     06/10/2019    CO2 26 06/10/2019       Hepatic Function Panel:   Lab Results   Component Value Date    ALKPHOS 603 06/09/2019    ALT 16 06/09/2019    AST 16 06/09/2019    PROT 7.8 06/09/2019    PROT 6.6 07/21/2011    BILITOT 0.3 06/09/2019    BILIDIR <0.2 03/27/2018    IBILI see below 03/27/2018    LABALBU 3.4 06/09/2019       Micro:  6/9 BC x 2 - no growth to date  6/9 Resp PCR panel negative  6/9 Influenza ag negative    5/31 Wound cult: light growth E cloacae  Enterobacter cloacae   Antibiotic Interpretation ISAAC Status     amoxicillin-clavulanate Resistant >16/8 mcg/mL       ampicillin Resistant >16 mcg/mL       ceFAZolin Resistant >16 mcg/mL       cefepime Sensitive <=2 mcg/mL       cefotaxime Sensitive <=2 mcg/mL       cefTRIAXone Sensitive <=1 mcg/mL       cefuroxime Resistant <=4 mcg/mL       ciprofloxacin Sensitive <=1 mcg/mL       gentamicin Sensitive <=4 mcg/mL       meropenem Sensitive <=1 mcg/mL       piperacillin-tazobactam Sensitive <=16 mcg/mL       trimethoprim-sulfamethoxazole Sensitive <=2/38 mcg/mL        Imaging:   Xray   Findings worrisome for osteomyelitis of the head of the fourth metacarpal.       Changes of the posterior calcaneus which may be postsurgical, infectious or posttraumatic. Please correlate clinically          MRI: RIGHT    Impression:   1. 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   2. Soft tissue ulceration of the lateral foot with subcutaneous emphysema. 3. 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 Given the proximity to this ulceration this is    suspicious for early involvement of osteomyelitis. 4. Moderate edema of the cuboid with transversely oriented T1 hypointense line suspicious for stress or insufficiency fracture. 5. Moderate edema of the posterior calcaneus with fracture line of the far posterior superior calcaneus.  The edema is favored to be related to primary fracture rather than representing osteomyelitis with a secondary pathologic fracture. 7. Slight edema without abnormal T1 signal of the third digit metatarsal head and proximal phalanx likely reactive. 8. Subcutaneous edema consistent with cellulitis. 9. Generalized edema of the musculature consistent with chronic small vessel ischemic changes some/denervation from diabetes. 10. Degenerative changes as above. IMPRESSION:      Patient Active Problem List   Diagnosis    Feet clawing    Diabetic neuropathy (HCC)    Tinea pedis    Dyslipidemia    HTN (hypertension)    Amenorrhea    Dyspareunia    Neuropathy    Bacterial vaginosis    Pain in back    Anomalies of nails    Tobacco abuse    Vaginal bleeding, abnormal    Carpal tunnel syndrome    Scalp lesion    ETOH abuse    DM type 2, uncontrolled, with lower extremity ulcer (HCC)    Pseudocyst, pancreas    Pancreatitis    Asthma    Pain, eye, right    Pneumonia    Nicotine addiction    Acute pancreatitis    Hypoxia    Onychomycosis    Depressive disorder, not elsewhere classified    Anxiety state    Tinea pedis    Open wound of left foot    Burn    Obesity (BMI 30-39. 9)    S/P foot surgery, right    Pain medication agreement signed    Post-op pain    Open wound of left foot with complication    Cellulitis    Diabetic foot infection ([address]    Bilateral lower extremity edema    Chronic multifocal osteomyelitis of left foot (HCC)    Open wound of right foot    Diabetic ulcer of right foot associated with type 2 diabetes mellitus, with bone involvement without evidence of necrosis (HCC)    Cellulitis of right foot    Acute systolic congestive heart failure (HCC)    Acute osteomyelitis of metatarsal bone of right foot (HCC)    Cellulitis of right lower extremity    Cast discomfort    Bronchitis    Cellulitis and abscess of foot    Diabetic polyneuropathy associated with type 2 diabetes mellitus ([address]    Group B streptococcal infection    Diabetic ulcer of left foot associated with type 2 diabetes mellitus, with fat layer exposed ([facility]    Tendonitis, Achilles, right    Diabetic ulcer of right foot due to type 2 diabetes mellitus (HCC)    Opiate dependence (HCC)    Class 1 obesity in adult    CKD (chronic kidney disease), stage III ([facility]    Diabetic foot infection (HCC)    Chronic osteomyelitis of right foot with draining sinus (HCC)    FAHEEM (acute kidney injury) ([facility]    Acute blood loss anemia    Sepsis (HCC)       DM, neuropathy, h/o substance abuse / chronic pain    R DM foot wound with extension osteomyelitis   - has had GNR, E faecalis, GBS in past   - cult now with E cloacae   - MRI with 4th MT / prox phalanx osteomyelitis     6/6/19 - remaining digits resected, 4th distal MT resection, further 5th MT resection    6/9/19   - Readmitted with lethargy secondary to narcotic (on methadone 140 mg !), awaken with narcan   - Hypoxia, patchy infiltrated c/c aspiration pneumonitis    Pt is now afebrile, WBC down, on RA. RECOMMENDATIONS:    Change to ceftriaxone + flagyl  Wound care per Podiatry    Narcotic / pain meds per Hospitalist    See Joan Bernal    Discussed with pt, her daughter  Villa Uribe MD    INFUSION ORDERS:  Ceftriaxone 2 gm iv daily through 7/3/19  - First dose given in hospital  - Disposition / date discharge  - Check CBC w diff, CMP, ESR, CRP every Mon or Tue - FAX result to [phone]  - Call with antibiotic / infusion issues, [phone]  - Facility - any change in status, transfer out of facility or to hospital - call [phone]  - No f/u in outpatient ID office necessary. Follow-up with Podiatry.

## 2019-06-10 NOTE — CONSULTS
Department of Podiatry Consult Note  Resident       Reason for Consult:  Healthcare acquired pneumonia recent d/c on IV abx for osteomyelitis   Requesting Physician:  Roger Dial:  \"I thought I was dead\"    HISTORY OF PRESENT ILLNESS:                The patient is a 52 y.o. female with significant past medical history listed below who is consulted for healthcare acquired pneumonia recent d/c on IV abx for osteomyelitis. Patient was recently discharged on Friday, 6/7/19, after being admitted for osteomyelitis of the right foot. During patients previous stay, she had a TMA and SONNY procedure of the right foot. Patient was then discharged to a SNF and presented back to [facility] yesterday evening after being found unresponsive at the facility. Patient was then revived with narcan. Patient states that since being discharged, the facility has not changed either dressing. Patient denies any pain to her feet. Patient is upset and tearful during encounter stating \"they were trying to take me from my kids and my daughter kept yelling 'mom, don't go'\". Denies any pedal complaints at this time.       Past Medical History:        Diagnosis Date    Amenorrhea     Anomalies of nails     Asthma 5/14/04    Athlete's foot 8/2010    Bacterial vaginosis 04/2008    Carpal tunnel syndrome 5/2007    COPD (chronic obstructive pulmonary disease) ([facility]     Diabetes mellitus type II 08/2007    Diabetic neuropathy ([facility] 8/10    DVT (deep venous thrombosis) ([facility] [facility] 3/2004    Dyslipidemia 5/2009    Dyspareunia 05/2009    ETOH abuse 3/04/2007    Feet clawing     HTN (hypertension)     Hx of blood clots     Hyperlipidemia     MRSA (methicillin resistant staph aureus) culture positive 11/06/2017; 11/17/2017    foot; leg     Neuropathy 05/2009    polyneuropathy    Pain, back 04/2008    Pain, eye, right 5/14/04    Pancreatitis 5/14/04    Pseudocyst, pancreas 5/14/04    Scalp lesion 08/2007    Tinea pedis     Tobacco abuse 2008    Vaginal bleeding, abnormal 2007     Past Surgical History:        Procedure Laterality Date     SECTION  unknown    FOOT DEBRIDEMENT Right 2019    INCISION AND DRAINAGE WITH APPLICATION OF STRAVIX GRAFT RIGHT FOOT performed by Rosa Yung DPM at [address] Right 2019    RIGHT FOOT INCISION AND DRAINAGE WITH STAGING TRANSMETATARSAL AMPUTATION performed by Rosa Yung DPM at [address] Left 2016    I & D left foot    OTHER SURGICAL HISTORY Right 10/20/2017    RIGHT GASTROC LENGTHENING ENDOSCOPIC, INJECTION OF AMNI GRAFT    OTHER SURGICAL HISTORY Right 2018    Diabetic foot ulcer I&D w/ integra graft application    MI DEBRIDEMENT, SKIN, SUB-Q TISSUE,MUSCLE,BONE,=<20 SQ CM Right 2018    RIGHT FOOT DEBRIDEMENT INCISION AND DRAINAGE, PARTIAL 5TH RAY AMPUTATION performed by Rosa Yung DPM at [address] [facility] / [address]  [address]    cryotherapy done on lesion    TOE AMPUTATION Left 2017    AMPUTATION LEFT GREAT TOE                  Current Medications:    Current Facility-Administered Medications: albuterol (PROVENTIL) nebulizer solution 2.5 mg, 2.5 mg, Nebulization, Q4H PRN  aspirin tablet 325 mg, 325 mg, Oral, Daily  atorvastatin (LIPITOR) tablet 20 mg, 20 mg, Oral, Nightly  methadone (DOLOPHINE) tablet 140 mg, 140 mg, Oral, QAM  metoprolol succinate (TOPROL XL) extended release tablet 50 mg, 50 mg, Oral, Nightly  sodium chloride flush 0.9 % injection 10 mL, 10 mL, Intravenous, 2 times per day  sodium chloride flush 0.9 % injection 10 mL, 10 mL, Intravenous, PRN  magnesium hydroxide (MILK OF MAGNESIA) 400 MG/5ML suspension 30 mL, 30 mL, Oral, Daily PRN  ondansetron (ZOFRAN) injection 4 mg, 4 mg, Intravenous, Q6H PRN  nicotine (NICODERM CQ) 21 MG/24HR 1 patch, 1 patch, Transdermal, Daily  linezolid (ZYVOX) IVPB 600 mg, 600 mg, Intravenous, Q12H  insulin glargine (LANTUS) injection pen 30 Units, 30 Units, Subcutaneous, BID  insulin lispro (HUMALOG) injection pen 0-6 Units, 0-6 Units, Subcutaneous, TID WC  insulin lispro (HUMALOG) injection pen 0-3 Units, 0-3 Units, Subcutaneous, Nightly  glucose (GLUTOSE) 40 % oral gel 15 g, 15 g, Oral, PRN  dextrose 50 % IV solution, 12.5 g, Intravenous, PRN  glucagon (rDNA) injection 1 mg, 1 mg, Intramuscular, PRN  dextrose 5 % solution, 100 mL/hr, Intravenous, PRN  meropenem (MERREM) 1 g in sodium chloride 0.9 % 100 mL IVPB (mini-bag), 1 g, Intravenous, Q12H  gabapentin (NEURONTIN) capsule 700 mg, 700 mg, Oral, BID  insulin lispro (HUMALOG) injection pen 3 Units, 3 Units, Subcutaneous, TID WC  apixaban (ELIQUIS) tablet 10 mg, 10 mg, Oral, BID **FOLLOWED BY** [START ON 6/15/2019] apixaban (ELIQUIS) tablet 5 mg, 5 mg, Oral, BID  Allergies:   Sulfa antibiotics  Social History:    TOBACCO:   reports that she has been smoking cigarettes. She has a 30.00 pack-year smoking history. She has never used smokeless tobacco.  ETOH:   reports that she does not drink alcohol. DRUGS:   reports that she does not use drugs. Family History:   History reviewed. No pertinent family history. REVIEW OF SYSTEMS:    CONSTITUTIONAL:  negative  MUSCULOSKELETAL:  negative  NEUROLOGICAL:  negative  PHYSICAL EXAM:      Vitals:    /72   Pulse 81   Temp 99.2 °F (37.3 °C) (Oral)   Resp 14   Ht 5' 2\" (1.575 m)   Wt 201 lb 11.5 oz (91.5 kg)   SpO2 94%   BMI 36.90 kg/m²     LABS:   Recent Labs     06/09/19  1508 06/10/19  0536   WBC 16.6* 12.1*   HGB 9.1* 7.9*   HCT 28.1* 24.5*    383     Recent Labs     06/10/19  0536      K 4.4      CO2 26   BUN 36*   CREATININE 1.4*     Recent Labs     06/09/19  1508   PROT 7.8           VASCULAR:DP/PT pulses palpable 1/4 b/l. CFT is brisk to distal stump RLE, and distal brandy of all digits on the LLE. Skin temp warm to warm proximal to distal b/l LE with no focal calor noted. Mild non-pitting edema noted RLE. No streaking, no lymphangitis.      NEUROLOGIC:Gross and epicritic sensation is diminished b/l. Protective sensation is diminished at all pedal sites b/l.     DERMATOLOGIC: Right LE: Surgical incision noted to the distal TMA stump, and extends plantarly at the lateral aspect. Skin edges are well-coapted with sutures intact. No active drainage noted. No surrounding erythema noted. No purulence, fluctuance, or malodor noted. Left LE: Full thickness wound noted sub metatarsal head 1. Wound base is granular. Periwound is hyperkeratotic. Wound does not probe, tract, or undermine        MUSCULOSKELETAL: No pain on compression of the b/l calf. IMAGING:  Post-operative XR right foot 6/6/19  FINDINGS:       There is transmetatarsal amputation of the fourth and fifth rays. There is also been prior amputation of the phalanges of the first, second and third rays.           Impression       Postoperative changes of recent transmetatarsal amputation as described           IMPRESSION/RECOMMENDATIONS:    -s/p TMA, right foot, 6/6/19  -Diabetic foot ulcer, Carpenter I, left foot   -DM type II uncontrolled with peripheral neuroapthy   -History of non-compliance  -Personal history of nicotine dependence      -Patient examined and evaluated at bedside   -Low grade fever, leukocytosis of 12.1 but unlikely 2/2 foot wounds as they do not appear acutely infected   -Right LE dressed with betadine, DSD, and posterior splint  -Left LE dressed with betadine, DSD, surgical shoe  -NWB on right  -Heel weight bearing on left in surgical shoe      Dispo:Surgical site on right LE and wound on left do not appear acutely infected. Will continue with local wound care while patient in house. Thank you for the opportunity to take part in the patient's care.    - The patient will be staffed with Dr. Jammie Hazel, DPM  PGY-1, Pager #: [phone]

## 2019-06-10 NOTE — PLAN OF CARE
Problem: Falls - Risk of:  Goal: Will remain free from falls  Description  Will remain free from falls  Outcome: Ongoing  Goal: Absence of physical injury  Description  Absence of physical injury  Outcome: Ongoing     Problem: Infection:  Goal: Will remain free from infection  Description  Will remain free from infection  Outcome: Ongoing     Problem: Safety:  Goal: Free from accidental physical injury  Description  Free from accidental physical injury  Outcome: Ongoing  Goal: Free from intentional harm  Description  Free from intentional harm  Outcome: Ongoing     Problem: Daily Care:  Goal: Daily care needs are met  Description  Daily care needs are met  Outcome: Ongoing     Problem: Pain:  Goal: Patient's pain/discomfort is manageable  Description  Patient's pain/discomfort is manageable  Outcome: Ongoing     Problem: Skin Integrity:  Goal: Skin integrity will stabilize  Description  Skin integrity will stabilize  Outcome: Ongoing     Problem: Discharge Planning:  Goal: Patients continuum of care needs are met  Description  Patients continuum of care needs are met  Outcome: Ongoing   Patient has gone from being lethargic to being more alert and making needs known. I have no further assessment at this time .

## 2019-06-10 NOTE — CONSULTS
Nephrology Consult Note                                                                                                                                                                                      Mosie Gram MD Reida Spurling MD                                                                                            [facility]                                                                                                                                                                                   Office : [phone]     Fax :[phone]              Patient's Name: Magy Benton  11:29 AM  6/10/2019    Reason for Consult:  Court Wendie   Requesting Physician:  Jose Pinzon MD      Chief Complaint:  AMS     History of Present Ilness:     52 y.o. female w/ Hx of COPD, T2DM, Diabetic neuropathy, DVT, HLD, HTN, IVDU & EtOH abuse and pancreatitis; w/ recent dc from [facility] s/p 4th distal resection of digits on R foot and 4th and 5th MT resection, presents from [facility] with concerns for being more lethargic than usual. She is on 140 mg methadone daily. On arrival to the ED she was lethargic but arousable to sternal rub.     Post Narcan she felt better            Past Medical History:   Diagnosis Date    Amenorrhea     Anomalies of nails     Asthma 5/14/04    Athlete's foot 8/2010    Bacterial vaginosis 04/2008    Carpal tunnel syndrome 5/2007    [facility] [address]     Diabetes mellitus type II 08/2007    Diabetic neuropathy ([facility] [address] 8/10    DVT (deep venous thrombosis) ([facility] [address] 3/2004    Dyslipidemia 5/2009    Dyspareunia 05/2009    ETOH abuse 3/04/2007    Feet clawing     HTN (hypertension)     Hx of blood clots     Hyperlipidemia     MRSA (methicillin resistant staph aureus) culture positive 11/06/2017; 11/17/2017    foot; leg     Neuropathy 05/2009    polyneuropathy    Pain, back 04/2008    Pain, eye, right 5/14/04    Pancreatitis 04    Pseudocyst, pancreas 04    Scalp lesion 2007    Tinea pedis     Tobacco abuse 2008    Vaginal bleeding, abnormal 2007       Past Surgical History:   Procedure Laterality Date     SECTION  unknown    FOOT DEBRIDEMENT Right 2019    INCISION AND DRAINAGE WITH APPLICATION OF STRAVIX GRAFT RIGHT FOOT performed by Gretel Miguel DPM at [address] [facility] Right 2019    RIGHT FOOT INCISION AND DRAINAGE WITH STAGING TRANSMETATARSAL AMPUTATION [facility] [address]    I & D left foot    OTHER SURGICAL HISTORY Right 10/20/2017    RIGHT GASTROC LENGTHENING ENDOSCOPIC, INJECTION OF AMNI GRAFT    OTHER SURGICAL HISTORY Right 2018    Diabetic foot ulcer I&D w/ integra graft application    MD DEBRIDEMENT, SKIN, SUB-Q TISSUE,MUSCLE,BONE,=<20 SQ CM Right 2018    RIGHT FOOT DEBRIDEMENT INCISION AND DRAINAGE, PARTIAL 5TH RAY AMPUTATION performed by Gretel Miguel DPM at [address] [facility] [address]  [address]    cryotherapy done on lesion    TOE AMPUTATION Left 2017    AMPUTATION LEFT GREAT TOE                    History reviewed. No pertinent family history. reports that she has been smoking cigarettes. She has a 30.00 pack-year smoking history. She has never used smokeless tobacco. She reports that she does not drink alcohol or use drugs.     Allergies:  Sulfa antibiotics    Current Medications:      albuterol (PROVENTIL) nebulizer solution 2.5 mg Q4H PRN   atorvastatin (LIPITOR) tablet 20 mg Nightly   methadone (DOLOPHINE) tablet 140 mg QAM   metoprolol succinate (TOPROL XL) extended release tablet 50 mg Nightly   sodium chloride flush 0.9 % injection 10 mL 2 times per day   sodium chloride flush 0.9 % injection 10 mL PRN   magnesium hydroxide (MILK OF MAGNESIA) 400 MG/5ML suspension 30 mL Daily PRN   ondansetron (ZOFRAN) injection 4 mg Q6H PRN nicotine (NICODERM CQ) 21 MG/24HR 1 patch Daily   linezolid (ZYVOX) IVPB 600 mg Q12H   insulin glargine (LANTUS) injection pen 30 Units BID   insulin lispro (HUMALOG) injection pen 0-6 Units TID WC   insulin lispro (HUMALOG) injection pen 0-3 Units Nightly   glucose (GLUTOSE) 40 % oral gel 15 g PRN   dextrose 50 % IV solution PRN   glucagon (rDNA) injection 1 mg PRN   dextrose 5 % solution PRN   meropenem (MERREM) 1 g in sodium chloride 0.9 % 100 mL IVPB (mini-bag) Q12H   gabapentin (NEURONTIN) capsule 700 mg BID   insulin lispro (HUMALOG) injection pen 3 Units TID WC   apixaban (ELIQUIS) tablet 10 mg BID   Followed by    Giacomo Cuff ON 6/15/2019] apixaban (ELIQUIS) tablet 5 mg BID       Review of Systems:   14 point ROS obtained but were negative except mentioned in HPI      Physical exam:     Vitals:  BP (!) 147/74   Pulse 86   Temp 99.1 °F (37.3 °C) (Oral)   Resp 16   Ht 5' 2\" (1.575 m)   Wt 201 lb 11.5 oz (91.5 kg)   SpO2 93%   BMI 36.90 kg/m²   Constitutional:  Lethargic   Skin: no rash, turgor wnl  Heent:  eomi, mmm  Neck: no bruits or jvd noted  Cardiovascular:  S1, S2 without m/r/g  Respiratory: CTA B without w/r/r  Abdomen:  +bs, soft, nt, nd  Ext: no lower extremity edema  Psychiatric: mood and affect appropriate  Musculoskeletal:  Rom, muscular strength intact    Data:   Labs:  CBC:   Recent Labs     06/09/19  1508 06/10/19  0536   WBC 16.6* 12.1*   HGB 9.1* 7.9*    383     BMP:  Recent Labs     06/09/19  1508 06/10/19  0536    142   K 4.2 4.4    107   CO2 24 26   BUN 38* 36*   CREATININE 1.7* 1.4*   GLUCOSE 36* 143*     Ca/Mg/Phos: Recent Labs     06/09/19  1508 06/10/19  0536   CALCIUM 10.0 9.1     Hepatic: Recent Labs     06/09/19  1508   AST 16   ALT 16   BILITOT 0.3   ALKPHOS 603*     Troponin:   Recent Labs     06/09/19  1508   TROPONINI <0.01     BNP: No results for input(s): BNP in the last 72 hours.   Lipids: No results for input(s): CHOL, TRIG, HDL, LDLCALC, LABVLDL in the

## 2019-06-10 NOTE — PROGRESS NOTES
4 Eyes Admission Assessment     I agree as the admission nurse that 2 RN's have performed a thorough Head to Toe Skin Assessment on the patient. ALL assessment sites listed below have been assessed on admission. Areas assessed by both nurses: ***  [x]   Head, Face, and Ears   [x]   Shoulders, Back, and Chest  [x]   Arms, Elbows, and Hands   [x]   Coccyx, Sacrum, and Ischum  [x]   Legs, Feet, and Heels        Does the Patient have Skin Breakdown? Yes a wound was noted on the Admission Assessment and an LDA was Initiated documentation include the Abraham-wound, Wound Assessment, Measurements, Dressing Treatment, Drainage, and Color\", Surgical wound and MASD abraham area and folds.          Ramirez Prevention initiated:  Yes   Wound Care Orders initiated:  Surgical wounds       WOC nurse consulted for Pressure Injury (Stage 3,4, Unstageable, DTI, NWPT, and Complex wounds):  NA      Nurse 1 eSignature: Electronically signed by Jesus Cruz RN on 6/10/19 at 12:29 AM    **SHARE this note so that the co-signing nurse is able to place an eSignature**    Nurse 2 eSignature: {Esignature:[id number]}

## 2019-06-10 NOTE — PROGRESS NOTES
Occupational Therapy   Occupational Therapy Initial Assessment/Treatment  Date: 6/10/2019   Patient Name: Olimpia Hendrix  MRN: [id number]     : 1965    Date of Service: 6/10/2019    Discharge Recommendations:  Olimpia Hendrix scored a 19/24 on the AM-PAC ADL Inpatient form. Current research shows that an AM-PAC score of 17 or less is typically not associated with a discharge to the patient's home setting. Based on the patients AM-PAC score and their current ADL deficits, it is recommended that the patient have 3-5 sessions per week of Occupational Therapy at d/c to increase the patients independence. OT Equipment Recommendations  Equipment Needed: No  Other: defer    Assessment   Performance deficits / Impairments: Decreased functional mobility ; Decreased ADL status; Decreased endurance  Assessment: Pt able to complete SPT with cga this date, limited by UE fatigue and decreased activity tolerance. Pt was at SNF <2 days following recent hospitalization with foot sx and subsequent NWB status. Pt states plan to return to SNF upon d/c. Pt would benefit from ongoing OT to maxmize safety/independence for d/c home  Treatment Diagnosis: Impaired ADLs, mobility, activity tolerance  Prognosis: Good  Decision Making: Medium Complexity  Patient Education: OT role, d/c rec, safe transfers, activity promotion- pt verb understanding  REQUIRES OT FOLLOW UP: Yes  Activity Tolerance  Activity Tolerance: Patient Tolerated treatment well  Safety Devices  Safety Devices in place: Yes  Type of devices: Nurse notified;Call light within reach; Left in chair;Chair alarm in place           Patient Diagnosis(es): The primary encounter diagnosis was Altered mental status, unspecified altered mental status type. A diagnosis of Pneumonia due to organism was also pertinent to this visit.      has a past medical history of Amenorrhea, Anomalies of nails, Asthma, Athlete's foot, Bacterial vaginosis, Carpal tunnel syndrome, COPD (chronic obstructive pulmonary disease) ([facility] [address] Diabetes mellitus type II, Diabetic neuropathy ([facility] [address] DVT (deep venous thrombosis) ([facility] [address] Dyslipidemia, Dyspareunia, ETOH abuse, Feet clawing, HTN (hypertension), Hx of blood clots, Hyperlipidemia, MRSA (methicillin resistant staph aureus) culture positive, Neuropathy, Pain, back, Pain, eye, right, Pancreatitis, Pseudocyst, pancreas, Scalp lesion, Tinea pedis, Tobacco abuse, and Vaginal bleeding, abnormal.   has a past surgical history that includes  section (unknown); pre-malignant / benign skin lesion excision (7003); other surgical history (Left, 2016); Toe amputation (Left, 2017); other surgical history (Right, 10/20/2017); other surgical history (Right, 2018); pr debridement, skin, sub-q tissue,muscle,bone,=<20 sq cm (Right, 2018); Foot Debridement (Right, 2019); and Foot Debridement (Right, 2019). Treatment Diagnosis: Impaired ADLs, mobility, activity tolerance      Restrictions  Position Activity Restriction  Other position/activity restrictions: Up with assist, per daughter pt is NWB on R LE and wears a post-op shoe on L LE    Subjective   General  Chart Reviewed: Yes  Additional Pertinent Hx: Additional Pertinent Hx: Pt admitted on 19 with sepsis. H/o asthma, COPD, DM2, neuropathy, DVT, ETOH abuse. Family / Caregiver Present: Yes(daughter Haven)  Diagnosis: sepsis, pna  Subjective  Subjective: Pt in bed upon entry, agreeable to therapy evaluation.  Denied pain     Social/Functional History  Social/Functional History  Type of Home: Facility([facility])  Home Layout: One level  Bathroom Accessibility: Accessible  ADL Assistance: Needs assistance  Ambulation Assistance: Needs assistance(Transfers only at SNF using walker and NWB on R LE)  Additional Comments: Pt was at [facility] for short time       Objective   Vision: Impaired  Vision Exceptions: Wears glasses at all times  Hearing: Within functional limits G-Code Modifier : CK (06/10/19 9470)    Goals  Short term goals  Time Frame for Short term goals: by discharge  Short term goal 1: supervision SPT to bsc  Short term goal 2: supervision LB dressing using AE prn  Short term goal 3: complete chair pushups x5 and UE therex x10 reps each joint for strengthening needed for transfers with walker  Short term goal 4: supervision toileting       Therapy Time   Individual Concurrent Group Co-treatment   Time In 1320         Time Out 1402         Minutes 42         Timed Code Treatment Minutes:   27  Total Treatment Minutes:  43     If patient is d/c prior to next treatment session, this note will serve as the discharge summary    Amber Traylor OTR/L, [address], OT

## 2019-06-10 NOTE — PROGRESS NOTES
Abdomen: Soft, non-tender, non-distended with normal bowel sounds. Musculoskeletal: No clubbing, cyanosis or edema bilaterally. Full range of motion without deformity. RLE: In bandage, please refer to podiatry note for images. Neurologic: Neurovascularly intact without any focal sensory/motor deficits. Cranial nerves: II-XII intact, grossly non-focal.  Peripheral Pulses: +2 palpable, equal bilaterally     Labs:   Recent Labs     06/09/19  1508 06/10/19  0536   WBC 16.6* 12.1*   HGB 9.1* 7.9*   HCT 28.1* 24.5*    383     Recent Labs     06/09/19  1508 06/10/19  0536    142   K 4.2 4.4    107   CO2 24 26   BUN 38* 36*   CREATININE 1.7* 1.4*   CALCIUM 10.0 9.1     Recent Labs     06/09/19  1508   AST 16   ALT 16   BILITOT 0.3   ALKPHOS 603*     No results for input(s): INR in the last 72 hours. Recent Labs     06/09/19  1508   TROPONINI <0.01       Urinalysis:      Lab Results   Component Value Date    NITRU Negative 06/09/2019    WBCUA 10-20 06/09/2019    BACTERIA 1+ 06/09/2019    RBCUA 3-5 06/09/2019    BLOODU SMALL 06/09/2019    SPECGRAV 1.020 06/09/2019    GLUCOSEU Negative 06/09/2019       Radiology:  XR CHEST PORTABLE   Final Result      1. Diffuse bilateral airspace disease slightly greater leftward. 2.  Right-sided PICC catheter tip at the atrial caval junction. CT head without contrast   Final Result      1. No findings for acute intracranial abnormality. XR CHEST PORTABLE   Final Result      New patchy airspace opacities in bilateral lungs, left greater than right, may relate to multifocal pneumonia or less likely pulmonary edema. Recommended further evaluation with CT chest.               Assessment/Plan:    Acute Metabolic Encephalopathy: Likely 2/2 Infectious Etiology (Aspiration PNA) and Medications (Methadone, Gabapentin). RESOLVED.    CXR - Diffuse bilateral airspace dx L > R  - Continue Merrem  - Discontinue Linezolid  - RFA, Legionella, Strep, Resp Culture pending  - WBC improving  - Restarted Methadone at lower dose 120mg PO Daily  - Decreased Gabapentin to 400mg BID    OM 4 Metatarsal head s/p TMA:  - Podiatry consulted, appears non-infected as per podiatry  - ID Consulted    IDDM:  - At home on Glargine 40 U BID switched to 30U Lantus BID on admission; we will do 40 U daily as pt was hypoglycemic to 36  - D/C Insulin lispro 3u TID  - SSI  - Hypoglycemia protocol    Hx of DVT:  - Cont Eliquis    Hx of IVDU:  - Cont Methadone    Nomrocytic Anemia: Possibly hemodilution  - 9.1 -> 7.9  - Denied bleeding in stool  - H/H q12hr  - FOBT    HTN:  - Cont home Metoprolol 50mg XL Nightly    Tobacco Use:  - Nicotine patch  - Cessation education    DVT Prophylaxis: Eliquis  Diet: DIET CARDIAC;  Code Status: Full Code    I will discuss the patient with the senior resident and MD Tavares Lopez MD  Internal Medicine Resident PGY-1  6/10/2019  Patient seen and examined, labs and imaging studies reviewed, agree with assessment and plan as outlined above. Continue with current care and plan being trreated for gram negative pneumonia acute respiratory failure present on admission secondary to gram negative aspiration pneumonia, acute toxic encephalopahty present on admission from opiate dependence and polypharmacy w gabapentin discussed changes greater than 35 minutes spent on case over half face to face.      Herminia Rea MD Ephriam Van

## 2019-06-11 LAB
ANAEROBIC CULTURE: ABNORMAL
ANION GAP SERPL CALCULATED.3IONS-SCNC: 11 MMOL/L (ref 3–16)
BASOPHILS ABSOLUTE: 0.1 K/UL (ref 0–0.2)
BASOPHILS RELATIVE PERCENT: 1 %
BUN BLDV-MCNC: 37 MG/DL (ref 7–20)
CALCIUM SERPL-MCNC: 9 MG/DL (ref 8.3–10.6)
CHLORIDE BLD-SCNC: 107 MMOL/L (ref 99–110)
CO2: 22 MMOL/L (ref 21–32)
CREAT SERPL-MCNC: 1.5 MG/DL (ref 0.6–1.1)
EOSINOPHILS ABSOLUTE: 0.4 K/UL (ref 0–0.6)
EOSINOPHILS RELATIVE PERCENT: 4.7 %
GFR AFRICAN AMERICAN: 43
GFR NON-AFRICAN AMERICAN: 36
GLUCOSE BLD-MCNC: 115 MG/DL (ref 70–99)
GLUCOSE BLD-MCNC: 129 MG/DL (ref 70–99)
GLUCOSE BLD-MCNC: 131 MG/DL (ref 70–99)
GLUCOSE BLD-MCNC: 221 MG/DL (ref 70–99)
GLUCOSE BLD-MCNC: 255 MG/DL (ref 70–99)
GLUCOSE BLD-MCNC: 52 MG/DL (ref 70–99)
GLUCOSE BLD-MCNC: 62 MG/DL (ref 70–99)
GLUCOSE BLD-MCNC: 68 MG/DL (ref 70–99)
GRAM STAIN RESULT: ABNORMAL
HCT VFR BLD CALC: 23.6 % (ref 36–48)
HCT VFR BLD CALC: 26.4 % (ref 36–48)
HEMOGLOBIN: 7.6 G/DL (ref 12–16)
HEMOGLOBIN: 8.3 G/DL (ref 12–16)
LYMPHOCYTES ABSOLUTE: 1.8 K/UL (ref 1–5.1)
LYMPHOCYTES RELATIVE PERCENT: 20.9 %
MCH RBC QN AUTO: 28.4 PG (ref 26–34)
MCHC RBC AUTO-ENTMCNC: 31.3 G/DL (ref 31–36)
MCV RBC AUTO: 91 FL (ref 80–100)
MONOCYTES ABSOLUTE: 0.5 K/UL (ref 0–1.3)
MONOCYTES RELATIVE PERCENT: 5.8 %
NEUTROPHILS ABSOLUTE: 6 K/UL (ref 1.7–7.7)
NEUTROPHILS RELATIVE PERCENT: 67.6 %
ORGANISM: ABNORMAL
PDW BLD-RTO: 16.9 % (ref 12.4–15.4)
PERFORMED ON: ABNORMAL
PLATELET # BLD: 391 K/UL (ref 135–450)
PMV BLD AUTO: 8.2 FL (ref 5–10.5)
POTASSIUM REFLEX MAGNESIUM: 5.1 MMOL/L (ref 3.5–5.1)
RBC # BLD: 2.9 M/UL (ref 4–5.2)
SODIUM BLD-SCNC: 140 MMOL/L (ref 136–145)
URINE CULTURE, ROUTINE: NORMAL
WBC # BLD: 8.9 K/UL (ref 4–11)
WOUND/ABSCESS: ABNORMAL

## 2019-06-11 PROCEDURE — 6370000000 HC RX 637 (ALT 250 FOR IP): Performed by: STUDENT IN AN ORGANIZED HEALTH CARE EDUCATION/TRAINING PROGRAM

## 2019-06-11 PROCEDURE — 6360000002 HC RX W HCPCS: Performed by: INTERNAL MEDICINE

## 2019-06-11 PROCEDURE — 80048 BASIC METABOLIC PNL TOTAL CA: CPT

## 2019-06-11 PROCEDURE — 1200000000 HC SEMI PRIVATE

## 2019-06-11 PROCEDURE — 6360000002 HC RX W HCPCS: Performed by: STUDENT IN AN ORGANIZED HEALTH CARE EDUCATION/TRAINING PROGRAM

## 2019-06-11 PROCEDURE — 36415 COLL VENOUS BLD VENIPUNCTURE: CPT

## 2019-06-11 PROCEDURE — 6370000000 HC RX 637 (ALT 250 FOR IP): Performed by: INTERNAL MEDICINE

## 2019-06-11 PROCEDURE — 85018 HEMOGLOBIN: CPT

## 2019-06-11 PROCEDURE — 97535 SELF CARE MNGMENT TRAINING: CPT

## 2019-06-11 PROCEDURE — 85025 COMPLETE CBC W/AUTO DIFF WBC: CPT

## 2019-06-11 PROCEDURE — 97530 THERAPEUTIC ACTIVITIES: CPT

## 2019-06-11 PROCEDURE — 85014 HEMATOCRIT: CPT

## 2019-06-11 PROCEDURE — 2580000003 HC RX 258: Performed by: INTERNAL MEDICINE

## 2019-06-11 PROCEDURE — 97110 THERAPEUTIC EXERCISES: CPT

## 2019-06-11 PROCEDURE — 2580000003 HC RX 258: Performed by: STUDENT IN AN ORGANIZED HEALTH CARE EDUCATION/TRAINING PROGRAM

## 2019-06-11 PROCEDURE — 99232 SBSQ HOSP IP/OBS MODERATE 35: CPT | Performed by: INTERNAL MEDICINE

## 2019-06-11 RX ORDER — FUROSEMIDE 10 MG/ML
40 INJECTION INTRAMUSCULAR; INTRAVENOUS 2 TIMES DAILY
Status: DISCONTINUED | OUTPATIENT
Start: 2019-06-11 | End: 2019-06-12

## 2019-06-11 RX ORDER — UREA 10 %
2 LOTION (ML) TOPICAL
Status: ACTIVE | OUTPATIENT
Start: 2019-06-11 | End: 2019-06-11

## 2019-06-11 RX ADMIN — Medication 10 ML: at 23:16

## 2019-06-11 RX ADMIN — GABAPENTIN 400 MG: 400 CAPSULE ORAL at 09:27

## 2019-06-11 RX ADMIN — METOPROLOL SUCCINATE 50 MG: 50 TABLET, EXTENDED RELEASE ORAL at 21:17

## 2019-06-11 RX ADMIN — APIXABAN 10 MG: 5 TABLET, FILM COATED ORAL at 21:17

## 2019-06-11 RX ADMIN — APIXABAN 10 MG: 5 TABLET, FILM COATED ORAL at 09:26

## 2019-06-11 RX ADMIN — GABAPENTIN 400 MG: 400 CAPSULE ORAL at 21:16

## 2019-06-11 RX ADMIN — FUROSEMIDE 40 MG: 10 INJECTION, SOLUTION INTRAMUSCULAR; INTRAVENOUS at 18:41

## 2019-06-11 RX ADMIN — INSULIN LISPRO 3 UNITS: 100 INJECTION, SOLUTION INTRAVENOUS; SUBCUTANEOUS at 13:21

## 2019-06-11 RX ADMIN — ATORVASTATIN CALCIUM 20 MG: 20 TABLET, FILM COATED ORAL at 21:16

## 2019-06-11 RX ADMIN — METHADONE HYDROCHLORIDE 120 MG: 10 TABLET ORAL at 09:34

## 2019-06-11 RX ADMIN — FUROSEMIDE 40 MG: 10 INJECTION, SOLUTION INTRAMUSCULAR; INTRAVENOUS at 13:21

## 2019-06-11 RX ADMIN — INSULIN LISPRO 2 UNITS: 100 INJECTION, SOLUTION INTRAVENOUS; SUBCUTANEOUS at 22:16

## 2019-06-11 RX ADMIN — DEXTROSE MONOHYDRATE 100 ML/HR: 5 INJECTION INTRAVENOUS at 21:18

## 2019-06-11 RX ADMIN — INSULIN LISPRO 3 UNITS: 100 INJECTION, SOLUTION INTRAVENOUS; SUBCUTANEOUS at 14:43

## 2019-06-11 RX ADMIN — CEFTRIAXONE SODIUM 2 G: 2 INJECTION, POWDER, FOR SOLUTION INTRAMUSCULAR; INTRAVENOUS at 21:18

## 2019-06-11 RX ADMIN — MEROPENEM 1 G: 1 INJECTION, POWDER, FOR SOLUTION INTRAVENOUS at 09:27

## 2019-06-11 ASSESSMENT — PAIN DESCRIPTION - DESCRIPTORS: DESCRIPTORS: ACHING

## 2019-06-11 ASSESSMENT — PAIN SCALES - GENERAL
PAINLEVEL_OUTOF10: 0
PAINLEVEL_OUTOF10: 0
PAINLEVEL_OUTOF10: 3
PAINLEVEL_OUTOF10: 0
PAINLEVEL_OUTOF10: 5

## 2019-06-11 ASSESSMENT — PAIN DESCRIPTION - PAIN TYPE: TYPE: CHRONIC PAIN

## 2019-06-11 ASSESSMENT — PAIN DESCRIPTION - ONSET: ONSET: ON-GOING

## 2019-06-11 ASSESSMENT — PAIN DESCRIPTION - FREQUENCY: FREQUENCY: CONTINUOUS

## 2019-06-11 ASSESSMENT — PAIN DESCRIPTION - LOCATION: LOCATION: GENERALIZED

## 2019-06-11 NOTE — PROGRESS NOTES
Pt. In bed, Assessment complete, VSS, afebrile, medications taken without difficulty. Remains A & O X 4. Call light and over bed table within reach, no s/sx of distress note. Will continue to monitor.

## 2019-06-11 NOTE — PROGRESS NOTES
Occupational Therapy  Facility/Department: [address]  Daily Treatment Note  NAME: Shanice Thompson  : 1965  MRN: [id number]    Date of Service: 2019    Discharge Recommendations:  Shanice Thompson scored a 17/24 on the AM-PAC ADL Inpatient form. Current research shows that an AM-PAC score of 17 or less is typically not associated with a discharge to the patient's home setting. Based on the patients AM-PAC score and their current ADL deficits, it is recommended that the patient have 3-5 sessions per week of Occupational Therapy at d/c to increase the patients independence. OT Equipment Recommendations  Other: defer    Assessment   Performance deficits / Impairments: Decreased functional mobility ; Decreased ADL status; Decreased endurance  Assessment: Pt able to demonstrate functional mobility short distance from recliner chair and back with pivoting and small hops on LLE maintaining NWB status on RLE. Gallo Camille used for transfer from EOB to toilet. Multiple sit to stands completed with CGA and extra time for thinking through technique. Pt planning on discharge to [facility] prior to returning home. Continue OT per POC. Treatment Diagnosis: Impaired ADLs, mobility, activity tolerance  Patient Education: Role of OT , activity promotion, safe transfers, NWB on LLE - pt verb understanding , may need reinforcment  REQUIRES OT FOLLOW UP: Yes  Activity Tolerance  Activity Tolerance: Patient limited by fatigue;Patient Tolerated treatment well  Activity Tolerance: Pt requiring extra time for sequencing tasks and mental preparation. Pt with sleepyness at end of session with difficulty keeping eyes open, RN aware  Safety Devices  Safety Devices in place: Yes  Type of devices: Nurse notified;Call light within reach; Left in chair;Chair alarm in place;Gait belt           Restrictions  Position Activity Restriction  Other position/activity restrictions:  Up with assist, per daughter pt is NWB on R LE and wears a post-op shoe on L LE  Subjective   General  Chart Reviewed: Yes  Additional Pertinent Hx: Additional Pertinent Hx: Pt admitted on 6/9/19 with sepsis. H/o asthma, COPD, DM2, neuropathy, DVT, ETOH abuse. Response to previous treatment: Patient with no complaints from previous session  Family / Caregiver Present: Yes(daughter)  Diagnosis: sepsis, pna  Subjective  Subjective: Pt in bed upon entry and agreeable to OT treatment. General Comment  Comments: pt with sleepyness at end of session requiring VCs for keeping eyes open. RN aware  Vital Signs  Patient Currently in Pain: Denies   Orientation     Objective    ADL  Grooming: Independent(washing hands in mayda stedy)  UE Dressing: Stand by assistance(pt donning othotic boot seated in recliner chair requiring extra time for completion)  Toileting: Maximum assistance(assist with briefs up and down, assist with buttocks care)  Additional Comments: pt requesting therapist step out of bathroom leaving daughter present in bathroom. Daughter informed therapist prior to hygiene/transfer.         Balance  Sitting Balance: Independent  Standing Balance: Contact guard assistance  Standing Balance  Time: 1 min + 1min + 30sec + 30 sec  Activity: transfers, toileting, functional mobility  Functional Mobility  Functional - Mobility Device: Rolling Walker  Activity: Other(short distance from recliner chair and back)  Assist Level: Minimal assistance(CGA with Min A for recovery of LOB.)  Functional Mobility Comments: Pt maintaining NWB on RLE  Toilet Transfers  Toilet - Technique: Gabriele Lazar)  Equipment Used: Grab bars(use of GB on Gabriele Lazar)  Toilet Transfer: Contact guard assistance(with Gabriele Lazar)  Bed mobility  Supine to Sit: Stand by assistance(HOB elevated and use of GB)  Scooting: Stand by assistance(extra time required)  Transfers  Stand Step Transfers: Contact guard assistance(to/from recliner chair with VCs for safe technique and hand placment)  Sit to stand: Contact guard assistance(From EOB, toilet,Elsa Donta and recliner queta. extra time for completion and mental preparation. )  Stand to sit: Contact guard assistance(VCs for slowing descent)  Transfer Comments: Gal white used for transfer from EOB to toilet secondary to urgency.                      Exercises  Shoulder Depression: x 3 reps  Shoulder Elevation: x 3 reps  Chair Push-ups: x5 reps                    Plan  If pt discharges prior to next treatment, this note will serve as discharge summary  Plan  Times per week: 2-5x  Times per day: Daily  Current Treatment Recommendations: Strengthening, Balance Training, Functional Mobility Training, Endurance Training, Patient/Caregiver Education & Training, Self-Care / ADL    AM-PAC Score        AM-PAC Inpatient Daily Activity Raw Score: 17 (06/11/19 1534)  AM-PAC Inpatient ADL T-Scale Score : 37.26 (06/11/19 1534)  ADL Inpatient CMS 0-100% Score: 50.11 (06/11/19 1534)  ADL Inpatient CMS G-Code Modifier : CK (06/11/19 1534)    Goals (as determined and assessed by primary OT)  Short term goals  Time Frame for Short term goals: by discharge  Short term goal 1: supervision SPT to bsc - ongoing  Short term goal 2: supervision LB dressing using AE prn - ongoing  Short term goal 3: complete chair pushups x5 and UE therex x10 reps each joint for strengthening needed for transfers with walker - goal met for chair push ups 6/11  Short term goal 4: supervision toileting - ongoing       Therapy Time   Individual Concurrent Group Co-treatment   Time In 1320         Time Out 1418         Minutes 58         Timed Code Treatment Minutes: 58 Minutes     Total Treatment Minutes: [address] KIMMY [address]

## 2019-06-11 NOTE — FLOWSHEET NOTE
06/11/19 0950   Encounter Summary   Volunteer Visit   (Received communion from Narcisa Ware )   Sacraments   Communion Patient received fadi

## 2019-06-11 NOTE — PROGRESS NOTES
Blood sugar 115 after patient ate. Patient sitting up in bed, eating dinner, visiting with daughters.

## 2019-06-11 NOTE — PROGRESS NOTES
ID Follow-up NOTE    CC:   R DM foot infection  Antibiotics: Meropenem    Admit Date: 6/9/2019  Hospital Day: 3    Subjective:     Patient feels good. No R foot pain  No complaint      Objective:     Patient Vitals for the past 8 hrs:   BP Temp Temp src Pulse Resp SpO2 Weight   06/11/19 0914 (!) 146/70 97.7 °F (36.5 °C) Oral 75 -- 97 % --   06/11/19 0512 (!) 151/72 98.6 °F (37 °C) Oral 75 16 97 % --   06/11/19 0430 -- -- -- -- -- -- 206 lb 9.1 oz (93.7 kg)     I/O last 3 completed shifts: In: 1440 [P.O.:1440]  Out: 650 [Urine:650]  I/O this shift:  In: 300 [P.O.:300]  Out: -     EXAM:  GENERAL: No apparent distress.     HEENT: Membranes moist, no oral lesion  NECK:  Supple  LUNGS: Clear b/l, no rales, no dullness  CARDIAC: RRR, no murmur appreciated  ABD:  + BS, soft / NT  EXT:  No rash, no edema, no lesions - R foot with dressing   NEURO: No focal neurologic findings  PSYCH: Orientation, sensorium, mood normal  LINES:  Peripheral iv       Data Review:  Lab Results   Component Value Date    WBC 8.9 06/11/2019    HGB 8.3 (L) 06/11/2019    HCT 26.4 (L) 06/11/2019    MCV 91.0 06/11/2019     06/11/2019     Lab Results   Component Value Date    CREATININE 1.5 (H) 06/11/2019    BUN 37 (H) 06/11/2019     06/11/2019    K 5.1 06/11/2019     06/11/2019    CO2 22 06/11/2019       Hepatic Function Panel:   Lab Results   Component Value Date    ALKPHOS 603 06/09/2019    ALT 16 06/09/2019    AST 16 06/09/2019    PROT 7.8 06/09/2019    PROT 6.6 07/21/2011    BILITOT 0.3 06/09/2019    BILIDIR <0.2 03/27/2018    IBILI see below 03/27/2018    LABALBU 3.4 06/09/2019       Micro:  6/9 BC x 2 - no growth to date  6/9 Resp PCR panel negative  6/9 Influenza ag negative     5/31 Wound cult: light growth E cloacae  Enterobacter cloacae   Antibiotic Interpretation ISAAC Status     amoxicillin-clavulanate Resistant >16/8 mcg/mL       ampicillin Resistant >16 mcg/mL       ceFAZolin Resistant >16 mcg/mL       cefepime Sensitive <=2 mcg/mL       cefotaxime Sensitive <=2 mcg/mL       cefTRIAXone Sensitive <=1 mcg/mL       cefuroxime Resistant <=4 mcg/mL       ciprofloxacin Sensitive <=1 mcg/mL       gentamicin Sensitive <=4 mcg/mL       meropenem Sensitive <=1 mcg/mL       piperacillin-tazobactam Sensitive <=16 mcg/mL       trimethoprim-sulfamethoxazole Sensitive <=2/38 mcg/mL          Imaging:   Xray   Findings worrisome for osteomyelitis of the head of the fourth metacarpal.       Changes of the posterior calcaneus which may be postsurgical, infectious or posttraumatic. Please correlate clinically          MRI: RIGHT    Impression:   1. 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   2. Soft tissue ulceration of the lateral foot with subcutaneous emphysema. 3. 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 Given the proximity to this ulceration this is    suspicious for early involvement of osteomyelitis. 4. Moderate edema of the cuboid with transversely oriented T1 hypointense line suspicious for stress or insufficiency fracture. 5. Moderate edema of the posterior calcaneus with fracture line of the far posterior superior calcaneus. The edema is favored to be related to primary fracture rather than representing osteomyelitis with a secondary pathologic fracture. 7. Slight edema without abnormal T1 signal of the third digit metatarsal head and proximal phalanx likely reactive. 8. Subcutaneous edema consistent with cellulitis. 9. Generalized edema of the musculature consistent with chronic small vessel ischemic changes some/denervation from diabetes. 10. Degenerative changes as above.        Scheduled Meds:   furosemide  40 mg Intravenous BID    gabapentin  400 mg Oral BID    methadone  120 mg Oral QAM    insulin glargine  40 Units Subcutaneous Daily    atorvastatin  20 mg Oral Nightly    metoprolol succinate  50 mg Oral Nightly    sodium chloride flush  10 mL Intravenous 2 times per day    nicotine  1 patch Transdermal Daily    insulin lispro  0-6 Units Subcutaneous TID WC    insulin lispro  0-3 Units Subcutaneous Nightly    meropenem  1 g Intravenous Q12H    apixaban  10 mg Oral BID    Followed by   Winnie Cueva ON 6/15/2019] apixaban  5 mg Oral BID       Continuous Infusions:   dextrose         PRN Meds:  albuterol, sodium chloride flush, magnesium hydroxide, ondansetron, glucose, dextrose, glucagon (rDNA), dextrose      Assessment:     44 yo woman with hx obesity, DM, neuropathy. Hx L partial hallux amp 2/2017 (cult + MRSA, E faecalis)  Hx R partial 5th ray resection 8/17/18, cult E cloacae, Ecoli  Hx R foot wound infection 1/2019, cult + E faecalis, Ps aeruginosa, C albicans  Hx R foot wound infection 3/2019, cult + GBS     Admit 5/31 - seen at [facility] and referred to [facility] for admission. R foot wound with exposed 4th MT head.  Wound worse over time with bloody drain and pain per pt  Afebile, WBC 9.4, cult light growth E cloacae.  Started on vancomycin + zosyn  MRI with 4th MT and 4th prox phalanx destruction. R foot, remaining toes resected  6/6/19. Discharged to 6/7/19 to [facility] on iv ertapenem     Presents 6/9/19 with lethargy / unable to arouse. Admit 6/9 - afebrile, WBC 16.6. CXR with patchy infiltrated. BC sent  Given narcan in ED. Pt woke up. Started on vancomycin + zosyn     Today, pt is awake and alert.  Afebrile, WBC 8.9  IMP/  DM, neuropathy, h/o substance abuse / chronic pain     R DM foot wound with extension osteomyelitis   - has had GNR, E faecalis, GBS in past   - cult now with E cloacae   - MRI with 4th MT / prox phalanx osteomyelitis     6/6/19 - remaining digits resected, 4th distal MT resection, further 5th MT resection     6/9/19   - Readmitted with lethargy

## 2019-06-11 NOTE — PROGRESS NOTES
Dedrick Herrera DPM at [address] Left 05/25/2016    I & D left foot    OTHER SURGICAL HISTORY Right 10/20/2017    RIGHT GASTROC LENGTHENING ENDOSCOPIC, INJECTION OF AMNI GRAFT    OTHER SURGICAL HISTORY Right 04/26/2018    Diabetic foot ulcer I&D w/ integra graft application    HI DEBRIDEMENT, SKIN, SUB-Q TISSUE,MUSCLE,BONE,=<20 SQ CM Right 8/17/2018    RIGHT FOOT DEBRIDEMENT INCISION AND DRAINAGE, PARTIAL 5TH RAY AMPUTATION performed by Dedrick Herrera DPM at [facility] / [address]  7/7003    cryotherapy done on lesion    TOE AMPUTATION Left 02/24/2017    AMPUTATION LEFT GREAT TOE                    History reviewed. No pertinent family history. reports that she has been smoking cigarettes. She has a 30.00 pack-year smoking history. She has never used smokeless tobacco. She reports that she does not drink alcohol or use drugs.     Allergies:  Sulfa antibiotics    Current Medications:      albuterol (PROVENTIL) nebulizer solution 2.5 mg Q4H PRN   gabapentin (NEURONTIN) capsule 400 mg BID   methadone (DOLOPHINE) tablet 120 mg QAM   insulin glargine (LANTUS) injection pen 40 Units Daily   atorvastatin (LIPITOR) tablet 20 mg Nightly   metoprolol succinate (TOPROL XL) extended release tablet 50 mg Nightly   sodium chloride flush 0.9 % injection 10 mL 2 times per day   sodium chloride flush 0.9 % injection 10 mL PRN   magnesium hydroxide (MILK OF MAGNESIA) 400 MG/5ML suspension 30 mL Daily PRN   ondansetron (ZOFRAN) injection 4 mg Q6H PRN   nicotine (NICODERM CQ) 21 MG/24HR 1 patch Daily   insulin lispro (HUMALOG) injection pen 0-6 Units TID WC   insulin lispro (HUMALOG) injection pen 0-3 Units Nightly   glucose (GLUTOSE) 40 % oral gel 15 g PRN   dextrose 50 % IV solution PRN   glucagon (rDNA) injection 1 mg PRN   dextrose 5 % solution PRN   meropenem (MERREM) 1 g in sodium chloride 0.9 % 100 mL IVPB (mini-bag) Q12H   apixaban (ELIQUIS) tablet 10 mg BID Followed by    Ge Penaloza ON 6/15/2019] apixaban (ELIQUIS) tablet 5 mg BID       Physical exam:     Vitals:  BP (!) 146/70   Pulse 75   Temp 97.7 °F (36.5 °C) (Oral)   Resp 16   Ht 5' 2\" (1.575 m)   Wt 206 lb 9.1 oz (93.7 kg)   SpO2 97%   BMI 37.78 kg/m²   Constitutional:  AA  Skin: no rash, turgor wnl  Heent:  eomi, mmm  Neck: no bruits or jvd noted  Cardiovascular:  S1, S2 without m/r/g  Respiratory: CTA B without w/r/r  Abdomen:  +bs, soft, nt, nd  Ext: no lower extremity edema  Psychiatric: mood and affect appropriate  Musculoskeletal:  Rom, muscular strength intact    Data:   Labs:  CBC:   Recent Labs     06/09/19  1508 06/10/19  0536 06/10/19  1410 06/11/19  0750   WBC 16.6* 12.1*  --  8.9   HGB 9.1* 7.9* 8.1* 8.3*    383  --  391     BMP:    Recent Labs     06/09/19  1508 06/10/19  0536 06/11/19  0750    142 140   K 4.2 4.4 5.1    107 107   CO2 24 26 22   BUN 38* 36* 37*   CREATININE 1.7* 1.4* 1.5*   GLUCOSE 36* 143* 129*     Ca/Mg/Phos:   Recent Labs     06/09/19  1508 06/10/19  0536 06/11/19  0750   CALCIUM 10.0 9.1 9.0     Hepatic:   Recent Labs     06/09/19  1508   AST 16   ALT 16   BILITOT 0.3   ALKPHOS 603*     Troponin:   Recent Labs     06/09/19  1508   TROPONINI <0.01     BNP: No results for input(s): BNP in the last 72 hours. Lipids: No results for input(s): CHOL, TRIG, HDL, LDLCALC, LABVLDL in the last 72 hours. ABGs: No results for input(s): PHART, PO2ART, RGR6OSM in the last 72 hours. INR: No results for input(s): INR in the last 72 hours.   UA:  Recent Labs     06/09/19  1543   COLORU Yellow   CLARITYU Clear   GLUCOSEU Negative   BILIRUBINUR Negative   KETUA Negative   SPECGRAV 1.020   BLOODU SMALL*   PHUR 6.0  6.0   PROTEINU 100*   UROBILINOGEN 0.2   NITRU Negative   LEUKOCYTESUR SMALL*   LABMICR YES   URINETYPE Voided      Urine Microscopic:   Recent Labs     06/09/19  1543   BACTERIA 1+*   WBCUA 10-20*   RBCUA 3-5*   EPIU 10-20     Urine Culture:   Recent Labs 06/09/19  1524   LABURIN No growth at 18-36 hours     Urine Chemistry:   Recent Labs     06/10/19  1433   LABCREA 143.7   PROTEINUR 234.20*             IMAGING:  XR CHEST PORTABLE   Final Result      1. Diffuse bilateral airspace disease slightly greater leftward. 2.  Right-sided PICC catheter tip at the atrial caval junction. CT head without contrast   Final Result      1. No findings for acute intracranial abnormality. XR CHEST PORTABLE   Final Result      New patchy airspace opacities in bilateral lungs, left greater than right, may relate to multifocal pneumonia or less likely pulmonary edema. Recommended further evaluation with CT chest.                 Assessment/Plan   1. FAHEEM on CKD 3     2. HTN    3. Anemia    4. Acid- base/ Electrolytes    5.  PNA     Plan   - cr stable 1.5   - K 5.1 - low k diet   - lasix IV for edema control   - d/c IVF   - abx per ID   - dec sedatives   - Monitor BP   - monitor renal function   - will adjust BP meds as needed                 Thank you for allowing us to participate in care of [address] free to contact me   [facility] [address]  Office : [phone]  Fax :[phone]

## 2019-06-11 NOTE — PROGRESS NOTES
Patient's blood sugar 68, provided with soda and crackers, upon recheck, patient continued to be sleeping between care and only ate 1/2 her snack. Blood sugar 52, patient now awake talking with daughters, eating crackers. Patient provided with pudding and soda, witnessed patient eating and drinking, will recheck blood sugar shortly. Patient reports feeling much better while eating snacks.

## 2019-06-11 NOTE — PROGRESS NOTES
refer to podiatry note for images. Neurologic: Neurovascularly intact without any focal sensory/motor deficits. Cranial nerves: II-XII intact, grossly non-focal.  Peripheral Pulses: +2 palpable, equal bilaterally     Labs:   Recent Labs     06/09/19  1508 06/10/19  0536 06/10/19  1410 06/11/19  0750   WBC 16.6* 12.1*  --  8.9   HGB 9.1* 7.9* 8.1* 8.3*   HCT 28.1* 24.5* 25.5* 26.4*    383  --  391     Recent Labs     06/09/19  1508 06/10/19  0536 06/11/19  0750    142 140   K 4.2 4.4 5.1    107 107   CO2 24 26 22   BUN 38* 36* 37*   CREATININE 1.7* 1.4* 1.5*   CALCIUM 10.0 9.1 9.0     Recent Labs     06/09/19  1508   AST 16   ALT 16   BILITOT 0.3   ALKPHOS 603*     No results for input(s): INR in the last 72 hours. Recent Labs     06/09/19  1508   TROPONINI <0.01       Urinalysis:      Lab Results   Component Value Date    NITRU Negative 06/09/2019    WBCUA 10-20 06/09/2019    BACTERIA 1+ 06/09/2019    RBCUA 3-5 06/09/2019    BLOODU SMALL 06/09/2019    SPECGRAV 1.020 06/09/2019    GLUCOSEU Negative 06/09/2019       Radiology:  XR CHEST PORTABLE   Final Result      1. Diffuse bilateral airspace disease slightly greater leftward. 2.  Right-sided PICC catheter tip at the atrial caval junction. CT head without contrast   Final Result      1. No findings for acute intracranial abnormality. XR CHEST PORTABLE   Final Result      New patchy airspace opacities in bilateral lungs, left greater than right, may relate to multifocal pneumonia or less likely pulmonary edema. Recommended further evaluation with CT chest.                   Assessment/Plan:    Acute Metabolic Encephalopathy: Likely 2/2 Infectious Etiology (Aspiration PNA) and Medications (Methadone, Gabapentin). RESOLVED.    CXR - Diffuse bilateral airspace dx L > R  - Discontinue Merrem; start Rocephin as per ID  - RFA, Legionella, Strep, Resp Culture negative  - WBC WNL now  - Restarted Methadone at lower dose 120mg PO Daily  - Decreased Gabapentin to 400mg BID     OM 4 Metatarsal head s/p TMA:  - Podiatry consulted, appears non-infected as per podiatry  - ID Consulted; continue Rocpehin till 7/3/19     IDDM: AM BG elevated  - At home on Glargine 40 U BID; pt was hypoglycemic to 36  - Increase Lantus 40U daily to 50U daily  - Restart home Insulin lispro 3u TID  - Medium SSI  - Hypoglycemia protocol     Hx of DVT:  - Cont Eliquis     Hx of IVDU:  - Cont Methadone    Nomrocytic Anemia: Possibly hemodilution  - 9.1 -> 7.9  - Denied bleeding in stool  - H/H q12hr  - FOBT     HTN:  - Cont home Metoprolol 50mg XL Nightly     Tobacco Use:  - Nicotine patch  - Cessation education    Dispo - awaiting pre-cert    DVT Prophylaxis: Already on Eliquis  Diet: DIET CARDIAC; Low Potassium  Code Status: Full Code     I will discuss the patient with the senior resident and MD Kyler Owens MD  Internal Medicine Resident PGY-1  6/11/2019  Patient seen and examined, labs and imaging studies reviewed, agree with assessment and plan as outlined above.   Continue with current care and plan as outlined above greater than 35 minutes spent on case over half face to face late entry on 6/12    Lakia Monterroso MD [address]

## 2019-06-11 NOTE — DISCHARGE INSTR - COC
Continuity of Care Form    Patient Name: Wendel Dancer   :  1965  MRN:  [id number]    Admit date:  2019  Discharge date:      Code Status Order: Full Code   Advance Directives:     Admitting Physician:  Migdalia Nj MD  PCP: Dany Marvin MD    Discharging Nurse: [facility] Unit/Room#: 6485/9861-04  Discharging Unit Phone Number: ***    Emergency Contact:   Extended Emergency Contact Information  Primary Emergency Contact: DANAE Nava [address] Phone: [phone]  Relation: Parent  Secondary Emergency Contact: [address] Phone: [phone]  Relation: Other    Past Surgical History:  Past Surgical History:   Procedure Laterality Date     SECTION  unknown    FOOT DEBRIDEMENT Right 2019    INCISION AND DRAINAGE WITH APPLICATION OF STRAVIX GRAFT RIGHT FOOT performed by Penny Faustin DPM at [address] Right 2019    RIGHT FOOT INCISION AND DRAINAGE WITH STAGING TRANSMETATARSAL AMPUTATION performed by Penny Faustin DPM at [place] [address] Left 2016    I & D left foot    OTHER SURGICAL HISTORY Right 10/20/2017    RIGHT GASTROC LENGTHENING ENDOSCOPIC, INJECTION OF AMNI GRAFT    OTHER SURGICAL HISTORY Right 2018    Diabetic foot ulcer I&D w/ integra graft application    [place] DEBRIDEMENT, SKIN, SUB-Q TISSUE,MUSCLE,BONE,=<20 SQ CM Right 2018    RIGHT FOOT DEBRIDEMENT INCISION AND DRAINAGE, PARTIAL 5TH RAY AMPUTATION performed by Penny Faustin DPM at [address] [place] [address] PRE-MALIGNANT / [address]  3/0884    cryotherapy done on lesion    TOE AMPUTATION Left 2017    AMPUTATION LEFT GREAT TOE                    Immunization History:   Immunization History   Administered Date(s) Administered    Influenza Virus Vaccine 2011, 2014, 10/16/2015, 10/27/2017    Influenza, [place], 3 yrs and older, IM, PF (Fluzone 3 yrs and older or Afluria 5 yrs and older) 11/10/2016    Influenza, [place], 6 mo and older, IM, PF (Flulaval, Fluarix) 01/05/2019    Pneumococcal Polysaccharide ([id number]) 11/13/2015    Tdap (Boostrix, Adacel) 07/22/2014       Active Problems:  Patient Active Problem List   Diagnosis Code    Feet clawing Q66.89    Diabetic neuropathy ([facility] [address] E11.40    Tinea pedis B35.3    Dyslipidemia E78.5    HTN (hypertension) I10    Amenorrhea N91.2    Dyspareunia [id number]    Neuropathy G62.9    Bacterial vaginosis N76.0, B96.89    Pain in back M54.9    Anomalies of nails Q84.6    Tobacco abuse Z72.0    Vaginal bleeding, abnormal N93.9    Carpal tunnel syndrome G56.00    Scalp lesion L98.9    ETOH abuse F10.10    DM type 2, uncontrolled, with lower extremity ulcer ([facility]) E11.622, E11.65, L97.909    Pseudocyst, pancreas K86.3    Pancreatitis K85.90    Asthma J45.909    Pain, eye, right H57.11    Pneumonia J18.9    Nicotine addiction F17.200    Acute pancreatitis K85.90    Hypoxia R09.02    Onychomycosis B35.1    Depressive disorder, not elsewhere classified F32.9    Anxiety state F41.1    Tinea pedis B35.3    Open wound of left foot S91.302A    Burn T30.0    Obesity (BMI 30-39. 9) E66.9    S/P foot surgery, right Z98.890    Pain medication agreement signed Z02.89    Post-op pain G89.18    Open wound of left foot with complication R90.780C    Cellulitis L03.90    Diabetic foot infection (HCC) E11.628, L08.9    Bilateral lower extremity edema R60.0    Chronic multifocal osteomyelitis of left foot ([facility]) M86.372    Open wound of right foot S91.301A    Diabetic ulcer of right foot associated with type 2 diabetes mellitus, with bone involvement without evidence of necrosis ([facility]) E11.621, L97.516    Cellulitis of right foot A66.729    Acute systolic congestive heart failure ([facility]) I50.21    Acute osteomyelitis of metatarsal bone of right foot ([facility]) M86.171    Cellulitis of right lower extremity 01/04/19 Foot Left;Plantar under great toe (Active)   Number of days: 157        Elimination:  Continence:   · Bowel: Yes  · Bladder: Yes  Urinary Catheter: None   Colostomy/Ileostomy/Ileal Conduit: No       Date of Last BM: 6/12/19***    Intake/Output Summary (Last 24 hours) at 6/11/2019 0911  Last data filed at 6/11/2019 0443  Gross per 24 hour   Intake 1440 ml   Output 650 ml   Net 790 ml     I/O last 3 completed shifts: In: 1440 [P.O.:1440]  Out: 650 [Urine:650]    Safety Concerns: At Risk for Falls    Impairments/Disabilities:      Vision and Hearing    Nutrition Therapy:  Current Nutrition Therapy:   - Oral Diet:  Cardiac and low potassium    Routes of Feeding: Oral  Liquids: No Restrictions  Daily Fluid Restriction: no  Last Modified Barium Swallow with Video (Video Swallowing Test): not done    Treatments at the Time of Hospital Discharge:   Respiratory Treatments:   Oxygen Therapy:  is not on home oxygen therapy.   Ventilator:    - No ventilator support    Rehab Therapies: Physical Therapy and Occupational Therapy  Weight Bearing Status/Restrictions: Non-weight bearing on right leg  Other Medical Equipment (for information only, NOT a DME order):  wheelchair, walker, bedside commode and hospital bed; 1 person assist with mayda white     Other Treatments: ***    Patient's personal belongings (please select all that are sent with patient):  None    RN SIGNATURE:  Electronically signed by Ursula Kathleen RN on 6/12/19 at 9:28 AM    CASE MANAGEMENT/SOCIAL WORK SECTION    Inpatient Status Date: 6/9/2019    Readmission Risk Assessment Score:  Readmission Risk              Risk of Unplanned Readmission:        38           Discharging to Facility/ [address] [place] [address]  Report: [phone]  Fax: [phone]    / signature: Electronically signed by SAURABH Bower on 6/11/19 at 9:12 AM    PHYSICIAN SECTION    Prognosis: Good    Condition at Discharge: Stable    Rehab Potential (if transferring to Rehab): Good    Recommended Labs or Other Treatments After Discharge: Check CBC w diff, CMP, ESR, CRP every Mon or Tue - FAX result to [phone]. Call with antibiotic / infusion issues, [phone]. Ceftriaxone 2 gram iv daily through 7/3/2019   picc line care    Podiatry Instructions: Please leave dressing on the RIGHT lower extremity dry, clean, and intact. Please change dressing on the LEFT lower extremity daily using tiffany, gauze, kerlix, ace, and surgical shoe. Patient is NON weight bearing on the right and heel weight bearing on the left. Make an appointment to follow up with the podiatry clinic in 1 week. Physician Certification: I certify the above information and transfer of Dacia Martin  is necessary for the continuing treatment of the diagnosis listed and that she requires [facility] for greater or less than 30 days.      Update Admission H&P: Changes in H&P as follows - decrease pain meds    PHYSICIAN SIGNATURE:  Electronically signed by Sommer Haque MD on 6/12/19 at 12:48 PM

## 2019-06-11 NOTE — PROGRESS NOTES
Podiatric Surgery Daily Progress Note  [place]ia Hoyt      Subjective :   Patient seen and examined this am at the bedside. Patient denies any acute overnight events. Patient denies N/V/F/C/SOB. Patient denies calf pain, thigh pain, chest pain. Review of Systems: A 12 point review of symptoms is unremarkable with the exception of the chief complaint. Patient specifically denies nausea, fever, vomiting, chills, shortness of breath, chest pain, abdominal pain, constipation or difficulty urinating. Objective     BP (!) 146/70   Pulse 75   Temp 97.7 °F (36.5 °C) (Oral)   Resp 16   Ht 5' 2\" (1.575 m)   Wt 206 lb 9.1 oz (93.7 kg)   SpO2 97%   BMI 37.78 kg/m²      I/O:    Intake/Output Summary (Last 24 hours) at 6/11/2019 0949  Last data filed at 6/11/2019 0942  Gross per 24 hour   Intake 1260 ml   Output 650 ml   Net 610 ml              Wt Readings from Last 3 Encounters:   06/11/19 206 lb 9.1 oz (93.7 kg)   05/31/19 190 lb (86.2 kg)   03/25/19 188 lb (85.3 kg)       LABS:    Recent Labs     06/10/19  0536 06/10/19  1410 06/11/19  0750   WBC 12.1*  --  8.9   HGB 7.9* 8.1* 8.3*   HCT 24.5* 25.5* 26.4*     --  391        Recent Labs     06/11/19  0750      K 5.1      CO2 22   BUN 37*   CREATININE 1.5*        Recent Labs     06/09/19  1508   PROT 7.8           LOWER EXTREMITY EXAMINATION    Dressing to b/l LE intact. No strikethrough noted to the external dressing. Mild drainage noted to the internal layers of the dressing. VASCULAR:DP/PT pulses palpable 1/4 b/l. CFT is brisk to distal stump RLE, and distal brandy of all digits on the LLE. Skin temp warm to warm proximal to distal b/l LE with no focal calor noted. Mild non-pitting edema noted RLE. No streaking, no lymphangitis.      NEUROLOGIC:Gross and epicritic sensation is diminished b/l. Protective sensation is diminished at all pedal sites b/l.     DERMATOLOGIC: Right LE: Surgical incision noted to the distal TMA stump, and extends

## 2019-06-11 NOTE — PLAN OF CARE
Problem: Falls - Risk of:  Goal: Will remain free from falls  Description  Will remain free from falls  Outcome: Ongoing  Note:   Patient uses call light appropriately to express needs. Bed to lowest position with door open and call light in reach. All fall precautions implemented at this time. Bed alarm on for safety. Siderails up x2. Non skid footwear in place. Patient has had no falls this shift. Will continue to monitor. Goal: Absence of physical injury  Description  Absence of physical injury  Outcome: Ongoing     Problem: Infection:  Goal: Will remain free from infection  Description  Will remain free from infection  Outcome: Ongoing     Problem: Safety:  Goal: Free from accidental physical injury  Description  Free from accidental physical injury  Outcome: Ongoing  Goal: Free from intentional harm  Description  Free from intentional harm  Outcome: Ongoing     Problem: Daily Care:  Goal: Daily care needs are met  Description  Daily care needs are met  Outcome: Ongoing     Problem: Pain:  Goal: Patient's pain/discomfort is manageable  Description  Patient's pain/discomfort is manageable  Outcome: Ongoing  Note:   Pain/discomfort being managed with PRN analgesics per MD orders. Pt able to express presence and absence of pain and rate pain appropriately using numerical scale. Problem: Skin Integrity:  Goal: Skin integrity will stabilize  Description  Skin integrity will stabilize  Outcome: Ongoing  Note:   Skin assessment performed each shift per protocol. Skin care protocol in place. Turns self.         Problem: Discharge Planning:  Goal: Patients continuum of care needs are met  Description  Patients continuum of care needs are met  Outcome: Ongoing

## 2019-06-11 NOTE — FLOWSHEET NOTE
06/11/19 1047   Encounter Summary   Services provided to: Patient   Referral/Consult From: Rounding   Continue Visiting   (Seen 6/11/19, LEONEL.)   Complexity of Encounter Moderate   Length of Encounter 15 minutes   Routine   Type Initial   Assessment Approachable   Intervention Nurtured hope   Outcome Expressed gratitude

## 2019-06-11 NOTE — PLAN OF CARE
Problem: Falls - Risk of:  Goal: Absence of physical injury  Description  Absence of physical injury  6/10/2019 2008 by Ruby Mcguire RN  Pt remains free from accidental injury during this stay on the unit . Will continue to monitor pt and assess per schedule and prn. Problem: Safety:  Goal: Free from accidental physical injury  Description  Free from accidental physical injury  6/11/2019 0324 by Lima Zacarias RN  Outcome: Ongoing  For patient safety, hourly rounding and frequent visual checks  in place, reviewed with patient/family, verbalized understanding.

## 2019-06-12 VITALS
BODY MASS INDEX: 38.01 KG/M2 | TEMPERATURE: 99 F | HEIGHT: 62 IN | RESPIRATION RATE: 18 BRPM | SYSTOLIC BLOOD PRESSURE: 172 MMHG | WEIGHT: 206.57 LBS | DIASTOLIC BLOOD PRESSURE: 83 MMHG | HEART RATE: 80 BPM | OXYGEN SATURATION: 98 %

## 2019-06-12 LAB
ANION GAP SERPL CALCULATED.3IONS-SCNC: 11 MMOL/L (ref 3–16)
BASOPHILS ABSOLUTE: 0 K/UL (ref 0–0.2)
BASOPHILS RELATIVE PERCENT: 0.5 %
BUN BLDV-MCNC: 37 MG/DL (ref 7–20)
CALCIUM SERPL-MCNC: 9.1 MG/DL (ref 8.3–10.6)
CHLORIDE BLD-SCNC: 103 MMOL/L (ref 99–110)
CO2: 27 MMOL/L (ref 21–32)
CREAT SERPL-MCNC: 1.3 MG/DL (ref 0.6–1.1)
EOSINOPHILS ABSOLUTE: 0.5 K/UL (ref 0–0.6)
EOSINOPHILS RELATIVE PERCENT: 4.8 %
GFR AFRICAN AMERICAN: 51
GFR NON-AFRICAN AMERICAN: 42
GLUCOSE BLD-MCNC: 183 MG/DL (ref 70–99)
GLUCOSE BLD-MCNC: 198 MG/DL (ref 70–99)
GLUCOSE BLD-MCNC: 246 MG/DL (ref 70–99)
HCT VFR BLD CALC: 24.5 % (ref 36–48)
HEMOGLOBIN: 8 G/DL (ref 12–16)
LYMPHOCYTES ABSOLUTE: 2.2 K/UL (ref 1–5.1)
LYMPHOCYTES RELATIVE PERCENT: 22.2 %
MCH RBC QN AUTO: 28.8 PG (ref 26–34)
MCHC RBC AUTO-ENTMCNC: 32.6 G/DL (ref 31–36)
MCV RBC AUTO: 88.6 FL (ref 80–100)
MONOCYTES ABSOLUTE: 0.5 K/UL (ref 0–1.3)
MONOCYTES RELATIVE PERCENT: 5.5 %
NEUTROPHILS ABSOLUTE: 6.6 K/UL (ref 1.7–7.7)
NEUTROPHILS RELATIVE PERCENT: 67 %
PDW BLD-RTO: 16.5 % (ref 12.4–15.4)
PERFORMED ON: ABNORMAL
PERFORMED ON: ABNORMAL
PLATELET # BLD: 474 K/UL (ref 135–450)
PMV BLD AUTO: 8.1 FL (ref 5–10.5)
POTASSIUM REFLEX MAGNESIUM: 4.4 MMOL/L (ref 3.5–5.1)
RBC # BLD: 2.77 M/UL (ref 4–5.2)
SODIUM BLD-SCNC: 141 MMOL/L (ref 136–145)
WBC # BLD: 9.8 K/UL (ref 4–11)

## 2019-06-12 PROCEDURE — 6370000000 HC RX 637 (ALT 250 FOR IP): Performed by: STUDENT IN AN ORGANIZED HEALTH CARE EDUCATION/TRAINING PROGRAM

## 2019-06-12 PROCEDURE — 6370000000 HC RX 637 (ALT 250 FOR IP): Performed by: INTERNAL MEDICINE

## 2019-06-12 PROCEDURE — 6360000002 HC RX W HCPCS: Performed by: INTERNAL MEDICINE

## 2019-06-12 PROCEDURE — 85025 COMPLETE CBC W/AUTO DIFF WBC: CPT

## 2019-06-12 PROCEDURE — 2580000003 HC RX 258: Performed by: STUDENT IN AN ORGANIZED HEALTH CARE EDUCATION/TRAINING PROGRAM

## 2019-06-12 PROCEDURE — 80048 BASIC METABOLIC PNL TOTAL CA: CPT

## 2019-06-12 RX ORDER — METHADONE HYDROCHLORIDE 10 MG/1
120 TABLET ORAL EVERY MORNING
Qty: 36 TABLET | Refills: 0 | Status: SHIPPED | OUTPATIENT
Start: 2019-06-12 | End: 2019-06-15

## 2019-06-12 RX ORDER — GABAPENTIN 400 MG/1
400 CAPSULE ORAL 2 TIMES DAILY
Qty: 60 CAPSULE | Refills: 0 | Status: SHIPPED | OUTPATIENT
Start: 2019-06-12 | End: 2019-07-15 | Stop reason: SDUPTHER

## 2019-06-12 RX ORDER — METHADONE HYDROCHLORIDE 10 MG/1
120 TABLET ORAL EVERY MORNING
Qty: 360 TABLET | Refills: 0 | Status: CANCELLED | OUTPATIENT
Start: 2019-06-13 | End: 2019-07-13

## 2019-06-12 RX ORDER — AMITRIPTYLINE HYDROCHLORIDE 50 MG/1
100 TABLET, FILM COATED ORAL NIGHTLY
Status: DISCONTINUED | OUTPATIENT
Start: 2019-06-12 | End: 2019-06-12 | Stop reason: HOSPADM

## 2019-06-12 RX ORDER — ASPIRIN 81 MG/1
81 TABLET ORAL DAILY
Qty: 30 TABLET | Refills: 2 | Status: ON HOLD | OUTPATIENT
Start: 2019-06-12 | End: 2019-10-24 | Stop reason: HOSPADM

## 2019-06-12 RX ADMIN — INSULIN LISPRO 3 UNITS: 100 INJECTION, SOLUTION INTRAVENOUS; SUBCUTANEOUS at 08:48

## 2019-06-12 RX ADMIN — GABAPENTIN 400 MG: 400 CAPSULE ORAL at 08:43

## 2019-06-12 RX ADMIN — METHADONE HYDROCHLORIDE 120 MG: 10 TABLET ORAL at 08:43

## 2019-06-12 RX ADMIN — Medication 10 ML: at 08:43

## 2019-06-12 RX ADMIN — APIXABAN 10 MG: 5 TABLET, FILM COATED ORAL at 08:43

## 2019-06-12 RX ADMIN — INSULIN LISPRO 2 UNITS: 100 INJECTION, SOLUTION INTRAVENOUS; SUBCUTANEOUS at 08:48

## 2019-06-12 RX ADMIN — INSULIN LISPRO 3 UNITS: 100 INJECTION, SOLUTION INTRAVENOUS; SUBCUTANEOUS at 13:11

## 2019-06-12 RX ADMIN — INSULIN LISPRO 4 UNITS: 100 INJECTION, SOLUTION INTRAVENOUS; SUBCUTANEOUS at 13:11

## 2019-06-12 RX ADMIN — FUROSEMIDE 40 MG: 10 INJECTION, SOLUTION INTRAMUSCULAR; INTRAVENOUS at 08:43

## 2019-06-12 ASSESSMENT — PAIN DESCRIPTION - PAIN TYPE: TYPE: CHRONIC PAIN

## 2019-06-12 ASSESSMENT — PAIN - FUNCTIONAL ASSESSMENT: PAIN_FUNCTIONAL_ASSESSMENT: ACTIVITIES ARE NOT PREVENTED

## 2019-06-12 ASSESSMENT — PAIN DESCRIPTION - LOCATION: LOCATION: LEG;FOOT

## 2019-06-12 ASSESSMENT — PAIN DESCRIPTION - ORIENTATION: ORIENTATION: RIGHT;LEFT

## 2019-06-12 ASSESSMENT — PAIN DESCRIPTION - ONSET: ONSET: ON-GOING

## 2019-06-12 ASSESSMENT — PAIN DESCRIPTION - PROGRESSION: CLINICAL_PROGRESSION: NOT CHANGED

## 2019-06-12 ASSESSMENT — PAIN SCALES - GENERAL: PAINLEVEL_OUTOF10: 6

## 2019-06-12 ASSESSMENT — PAIN DESCRIPTION - FREQUENCY: FREQUENCY: CONTINUOUS

## 2019-06-12 ASSESSMENT — PAIN DESCRIPTION - DESCRIPTORS: DESCRIPTORS: ACHING;TINGLING

## 2019-06-12 NOTE — DISCHARGE SUMMARY
Pt d/c to [facility] for rehab. Daughter to meet pt at facility and will fill scripts if needed. EMS left with all paperwork and pt personal belongings. D/c instructions gone over with pt and family-- verbalized understanding with no further questions.

## 2019-06-12 NOTE — PROGRESS NOTES
Nephrology Consult Note                                                                                                                                                                                      [facility]                                                                                                                                                                                   Office : [phone]     Fax :[phone]              Patient's Name: Doni Vaughan  11:06 AM  2019    UO Is good   Cr stable   BP in good range             Past Medical History:   Diagnosis Date    Amenorrhea     Anomalies of nails     Asthma 04    Athlete's foot 2010    Bacterial vaginosis 2008    Carpal tunnel syndrome 2007    COPD (chronic obstructive pulmonary disease) ([address]     Diabetes mellitus type II 2007    Diabetic neuropathy ([address] 8/10    [facility] 3/2004    Dyslipidemia 2009    Dyspareunia 2009    ETOH abuse 3/04/2007    Feet clawing     HTN (hypertension)     Hx of blood clots     Hyperlipidemia     MRSA (methicillin resistant staph aureus) culture positive 2017; 2017    foot; leg     Neuropathy 2009    polyneuropathy    Pain, back 2008    Pain, eye, right 04    Pancreatitis 04    Pseudocyst, pancreas 04    Scalp lesion 2007    Tinea pedis     Tobacco abuse 2008    Vaginal bleeding, abnormal 2007       Past Surgical History:   Procedure Laterality Date     SECTION  unknown    FOOT DEBRIDEMENT Right 2019    INCISION AND DRAINAGE WITH APPLICATION OF STRAVIX GRAFT RIGHT FOOT performed by Jeanie Jackman DPM at [facility] Right 2019    RIGHT FOOT INCISION AND DRAINAGE WITH STAGING TRANSMETATARSAL AMPUTATION performed by Anjelica Nichols DPM at [address] Left 05/25/2016    I & D left foot    OTHER SURGICAL HISTORY Right 10/20/2017    RIGHT GASTROC LENGTHENING ENDOSCOPIC, INJECTION OF AMNI GRAFT    OTHER SURGICAL HISTORY Right 04/26/2018    Diabetic foot ulcer I&D w/ integra graft application    MO DEBRIDEMENT, SKIN, SUB-Q TISSUE,MUSCLE,BONE,=<20 SQ CM Right 8/17/2018    RIGHT FOOT DEBRIDEMENT INCISION AND DRAINAGE, PARTIAL 5TH RAY AMPUTATION performed by Anjelica Nichols DPM at [facility] / [address]  7/7003    cryotherapy done on lesion    TOE AMPUTATION Left 02/24/2017    AMPUTATION LEFT GREAT TOE                    History reviewed. No pertinent family history. reports that she has been smoking cigarettes. She has a 30.00 pack-year smoking history. She has never used smokeless tobacco. She reports that she does not drink alcohol or use drugs.     Allergies:  Sulfa antibiotics    Current Medications:      amitriptyline (ELAVIL) tablet 100 mg Nightly   cefTRIAXone (ROCEPHIN) 2 g IVPB in D5W 50ml minibag Q24H   insulin lispro (HUMALOG) injection pen 0-12 Units TID WC   insulin lispro (HUMALOG) injection pen 0-6 Units Nightly   insulin glargine (LANTUS) injection pen 50 Units Daily   insulin lispro (HUMALOG) injection pen 3 Units TID WC   albuterol (PROVENTIL) nebulizer solution 2.5 mg Q4H PRN   gabapentin (NEURONTIN) capsule 400 mg BID   methadone (DOLOPHINE) tablet 120 mg QAM   atorvastatin (LIPITOR) tablet 20 mg Nightly   metoprolol succinate (TOPROL XL) extended release tablet 50 mg Nightly   sodium chloride flush 0.9 % injection 10 mL 2 times per day   sodium chloride flush 0.9 % injection 10 mL PRN   magnesium hydroxide (MILK OF MAGNESIA) 400 MG/5ML suspension 30 mL Daily PRN   ondansetron (ZOFRAN) injection 4 mg Q6H PRN   nicotine (NICODERM CQ) 21 MG/24HR 1 patch Daily   glucose (GLUTOSE) 40 % oral gel 15 g PRN   dextrose 50 % IV solution PRN   glucagon (rDNA) injection 1 mg PRN   dextrose 5 % solution PRN   apixaban (ELIQUIS) tablet 10 mg BID   Followed by    Sukumar Mancini ON 6/15/2019] apixaban (ELIQUIS) tablet 5 mg BID       Physical exam:     Vitals:  BP (!) 172/83   Pulse 80   Temp 99 °F (37.2 °C) (Oral)   Resp 18   Ht 5' 2\" (1.575 m)   Wt 206 lb 9.1 oz (93.7 kg)   SpO2 98%   BMI 37.78 kg/m²   Constitutional:  AA  Skin: no rash, turgor wnl  Heent:  eomi, mmm  Neck: no bruits or jvd noted  Cardiovascular:  S1, S2 without m/r/g  Respiratory: CTA B without w/r/r  Abdomen:  +bs, soft, nt, nd  Ext: no lower extremity edema  Psychiatric: mood and affect appropriate  Musculoskeletal:  Rom, muscular strength intact    Data:   Labs:  CBC:   Recent Labs     06/10/19  0536 06/11/19 0750 06/11/19 2230 06/12/19  0400   WBC 12.1*  --  8.9  --  9.8   HGB 7.9*   < > 8.3* 7.6* 8.0*     --  391  --  474*    < > = values in this interval not displayed. BMP:    Recent Labs     06/10/19  0536 06/11/19  0750 06/12/19  0400    140 141   K 4.4 5.1 4.4    107 103   CO2 26 22 27   BUN 36* 37* 37*   CREATININE 1.4* 1.5* 1.3*   GLUCOSE 143* 129* 198*     Ca/Mg/Phos:   Recent Labs     06/10/19  0536 06/11/19  0750 06/12/19  0400   CALCIUM 9.1 9.0 9.1     Hepatic:   Recent Labs     06/09/19  1508   AST 16   ALT 16   BILITOT 0.3   ALKPHOS 603*     Troponin:   Recent Labs     06/09/19  1508   TROPONINI <0.01     BNP: No results for input(s): BNP in the last 72 hours. Lipids: No results for input(s): CHOL, TRIG, HDL, LDLCALC, LABVLDL in the last 72 hours. ABGs: No results for input(s): PHART, PO2ART, EEP6KOK in the last 72 hours. INR: No results for input(s): INR in the last 72 hours.   UA:  Recent Labs     06/09/19  1543   COLORU Yellow   CLARITYU Clear   GLUCOSEU Negative   BILIRUBINUR Negative   KETUA Negative   SPECGRAV 1.020   BLOODU SMALL*   PHUR 6.0  6.0   PROTEINU 100*   UROBILINOGEN 0.2   NITRU Negative   LEUKOCYTESUR SMALL*   LABMICR YES   URINETYPE Voided Urine Microscopic:   Recent Labs     06/09/19  1543   BACTERIA 1+*   WBCUA 10-20*   RBCUA 3-5*   EPIU 10-20     Urine Culture:   Recent Labs     06/09/19  1524   LABURIN No growth at 18-36 hours     Urine Chemistry:   Recent Labs     06/10/19  1433   LABCREA 143.7   PROTEINUR 234.20*             IMAGING:  XR CHEST PORTABLE   Final Result      1. Diffuse bilateral airspace disease slightly greater leftward. 2.  Right-sided PICC catheter tip at the atrial caval junction. CT head without contrast   Final Result      1. No findings for acute intracranial abnormality. XR CHEST PORTABLE   Final Result      New patchy airspace opacities in bilateral lungs, left greater than right, may relate to multifocal pneumonia or less likely pulmonary edema. Recommended further evaluation with CT chest.                 Assessment/Plan   1. FAHEEM on CKD 3     2. HTN    3. Anemia    4. Acid- base/ Electrolytes    5.  PNA     Plan   - cr better   - K better  - low k diet   - lasix IV for edema control   - d/c IVF   - abx per ID   - dec sedatives   - Monitor BP   - monitor renal function   - will adjust BP meds as needed                 Thank you for allowing us to participate in care of [address] free to contact me   [facility] of [address]  Office : [phone]  Fax :[phone]

## 2019-06-12 NOTE — CARE COORDINATION
2019  [facility])  Clinical Case Management Department    Discharge Summary    Patient: You Harmon  MRN: [id number] / : 1965         Admission Documentation  Attending Provider: Frida Sidhu MD  Admit date/time: 2019  2:45 PM  Status: Inpatient [101]  Diagnosis: Sepsis ([address]     Readmission within last 30 days:  yes     Living Situation:  Discharge Planning  [address] [place] [address] Road: [facility]  Living Arrangements: Children  Support Systems: Children  Potential Assistance Needed: Sushant Mckeon    Written Discharge Summary:  SW met with patient and daughter at bedside. SW answered questions related to discharge. They were agreeable to plan for 1:00 pm discharge to [facility] via [address] Orders have been faxed. Service Assessment :       Values / Beliefs  Do you have any ethnic, cultural, sacramental, or spiritual Islam needs you would like us to be aware of while you are in the hospital?: No      Pharmacy: Facility to assist    Potential Assistance Purchasing Medications:     Does patient want to participate in local refill/meds to beds program?:      Notification for Sushant Mckeon placement completed in [facility]/PAS?: No, return to facility     Discharge disposition: [address] Phone: [phone] Fax: [phone]       Ancillary: N/A     Aniceto Hodgkins and her family were provided with choice of provider; she and her family are in agreement with the discharge plan.       Care Transitions patient: SAURABH Miller  [facility] [employer]  Case Management Department  Ph: [phone]
Case Management Daily Note:    Current Plan of Care: Acute Metabolic Encephalopathy resolved per plan. Podiatry assist with wound care on feet. IV ABX Ceftriaxone 2gm q24h through 7/3/19      PT AM-PAC: 17/24    Date: 6/10    OT AM-PAC: 19/24   Date: 6/10    DME needs: Defer. Discharge Plan:   [facility]   [address], [place], [address]  Report: [phone]  Fax: [phone]    Tentative Discharge Date: 6/11 vs. 6/12     Current Barriers to Discharge: Pre-cert pending for return. Resources/Information given: N/A      Case Management Notes: EUSEBIA spoke with Megan in admissions this morning at [facility]. Pre-cert is pending for patient. She will call once pre-cert is received. SW to follow. UPDATE: 2:50 pm. EUSEBIA met with patient and daughter at bedside. SW dicussed plan for discharge and that pre-cert is still pending. Goal is to return to [facility] tomorrow. All in agreement. EUSEBIA completed IMM and placed copy with family at bedside.        Marquita Navarro, MSW, LSW     [facility]   [phone]
Case Management Daily Note:    Current Plan of Care: D/C today        PT AM-PAC: 17/24    Date: 6/10     OT AM-PAC: 19/24   Date: 6/10     DME needs: Defer. Discharge Plan:   [facility], [place], [facility]  Report: [phone]  Fax: [phone]     Tentative Discharge Date: 6/12 at 1:00 pm      Current Barriers to Discharge: Pre-cert pending for return.      Resources/Information given: N/A      Case Management Notes: EUSEBIA received call for Megna at [facility] ([phone]) Pre-cert was received. EUSEBIA scheduled transport for 1:00 pm with [facility] and notified Didi Adams. SW completed transport form and provided it to [facility].          Justine Zavala, MSW, LSW     [facility] [employer]   [phone]
EUSEBIA received a call from Dr. Amado Lerma. SW informed him pre-cert is still pending for patient on this date.        Raf Rich, MSW, LSW     [facility]    [phone]
Admission:  Home care at home? No   Provider:    Provider Phone:    [address]:  N/A     Therapy Consults  PT evaluation needed?: Yes (Comment)  OT Evalulation Needed?: Yes (Comment)  SLP evaluation needed?: No      Pharmacy: Facility Assisted. Potential Assistance Purchasing Medications:     Does patient want to participate in local refill/meds to beds program?:      Goals of Care:     Patient plans for SNF/Placement: Adelinejanee Smith referral completed. Pre-cert requested to be started if necessary. Mode of transport from hospital: Ambulance      Barriers to discharge: Medical Clearance, possible pre-cert.      SAURABH Lorenz  [facility] [employer]  Case Management Department  Ph: [phone]

## 2019-06-12 NOTE — DISCHARGE SUMMARY
INTERNAL MEDICINE DEPARTMENT AT [address] [place] [address]  DISCHARGE SUMMARY    Patient ID: Ra Saunders                                             Discharge Date: 6/12/2019   Patient's PCP: Makayla Garcia MD                                          Discharge Physician: Violetta Martins MD  Admit Date: 6/9/2019   Admitting Physician: Claudia Klein MD    DISCHARGE DIAGNOSES:  Acute Metabolic Encephalopathy: Likely 2/2 Medications (Methadone, Gabapentin, Amitryptiline) and Infectious Etiology (PNA)  OM 4 Metatarsal head s/p TMA  IDDM  Hx of DVT   Hx of IVDU  Nomrocytic Anemia   HTN   Tobacco Use     History:  52 y.o. female w/ Hx of COPD, T2DM, Diabetic neuropathy, DVT, HLD, HTN, IVDU & EtOH abuse and pancreatitis; w/ recent dc from [facility] s/p 4th distal resection of digits on R foot and 4th and 5th MT resection, presents from [facility] with concerns for being more lethargic than usual. She is on 140 mg methadone daily. On arrival to the ED she was lethargic but arousable to sternal rub.      In the ED: Some HTN (-170s); otherwise afebrile and VS wnl - sating appropriately on 2L O2. Cr 1.7 (1.9 on 6/7; baseline appears to be ~1.3). CXR: patchy infiltrates concerning for PNA. BCx x2. UA small LE, neg nitrite, 1+ bacteria, 10-20 WBC. Vanc and zosyn for healthcare acquired PNA.      UDS pos for methadone. She was given a 0.6 Narcan due to desats + apneic episodes - from which she woke up and was very agitated. The following issues were managed at this hospitalization:     Acute Metabolic Encephalopathy: Likely 2/2 Medications (Methadone, Gabapentin, Amitryptiline) and Infectious Etiology (PNA). RESOLVED. CXR - Diffuse bilateral airspace dx L > R  She was initially started on broad spectrum Abx for PNA d/t recent hospitalization and her nursing home stay. ID was also consulted, eventually narrowed down to Rocephin.  Her Methadone dose was decreased to 120mg Daily, Gabapentin dose decreased to 400mg BID, Her Amytriptyline was held, will restart at discharge. She AxOx3 on discharge. OM 4 Metatarsal head s/p TMA:  Podiatry was consulted, appears non-infected as per podiatry. ID Consulted; her ABx was changed to Rocephin, continue Rocephin till 7/3/19. IDDM:    She was hypoglycemic. At home she was on Glargine 40 U BID; she was started on Lantus 40U daily; her AM BG was still elevated so will discharge on 50U daily. Continue home Insulin lispro 3u TID. She was placed on Medium SSI and Hypoglycemia protocol.     Hx of DVT:  Tx w/ Eliquis     Hx of IVDU:  Tx w/ Methadone, dose decreased to 120mg Daily     Nomrocytic Anemia: Possibly hemodilution  CBC was checked daily remained stable     HTN:  Tx w/ home Metoprolol 50mg XL Nightly     Tobacco Use:  Tx w/ Nicotine patch. Cessation education    Physical Exam:  BP (!) 172/83   Pulse 80   Temp 99 °F (37.2 °C) (Oral)   Resp 18   Ht 5' 2\" (1.575 m)   Wt 206 lb 9.1 oz (93.7 kg)   SpO2 96%   BMI 37.78 kg/m²     General appearance: No apparent distress, appears stated age and cooperative. HEENT: Pupils equal, round, and reactive to light. Conjunctivae/corneas clear. Respiratory:  Normal respiratory effort. CTAB. Cardiovascular: Systolic murmur present. Regular rate and rhythm with normal S1/S2.   Abdomen: Soft, non-tender, non-distended with normal bowel sounds. Musculoskeletal: No clubbing, cyanosis or edema bilaterally. Full range of motion without deformity. RLE: In bandage, please refer to podiatry note for images. Neurologic: Neurovascularly intact without any focal sensory/motor deficits. Cranial nerves: II-XII intact, grossly non-focal.  Peripheral Pulses: +2 palpable, equal bilaterally     Consults: Infectious Disease, Nephrology    Significant Diagnostic Studies:   XR CHEST PORTABLE   Final Result      1. Diffuse bilateral airspace disease slightly greater leftward. 2.  Right-sided PICC catheter tip at the atrial caval junction.       CT head without contrast   Final Result      1. No findings for acute intracranial abnormality. XR CHEST PORTABLE   Final Result      New patchy airspace opacities in bilateral lungs, left greater than right, may relate to multifocal pneumonia or less likely pulmonary edema. Recommended further evaluation with CT chest.               Disposition: SNF    Discharged Condition: Stable    Follow Up: Primary Care Physician in 1 week. Nephrology in 4 weeks. DISCHARGE MEDICATION:     Medication List      ASK your doctor about these medications    * ACCU-CHEK SOFTCLIX LANCETS Misc  1 strip by Does not apply route 2 times daily Check before breakfast and before going to bed     * Lancets Misc  1 each by Does not apply route 2 times daily PHARMACY MAY SUBSTITUTE TO TRUE METRIX LANCETS     albuterol sulfate  (90 Base) MCG/ACT inhaler  Inhale 2 puffs into the lungs every 6 hours as needed for Wheezing     amitriptyline 100 MG tablet  Commonly known as:  ELAVIL  Take 1 tablet by mouth nightly     ammonium lactate 12 % lotion  Commonly known as:  LAC-HYDRIN  Apply topically daily. * apixaban 5 MG Tabs tablet  Commonly known as:  ELIQUIS     * apixaban 5 MG Tabs tablet  Commonly known as:  ELIQUIS  Start taking on:  6/15/2019     aspirin 325 MG tablet  Take 1 tablet by mouth daily     atorvastatin 20 MG tablet  Commonly known as:  LIPITOR  Take 1 tablet by mouth daily     blood glucose test strips strip  Commonly known as:  TRUE METRIX BLOOD GLUCOSE TEST  1 each by In Vitro route 2 times daily As needed. furosemide 40 MG tablet  Commonly known as:  LASIX  Take 1 tablet by mouth 2 times daily     gabapentin 800 MG tablet  Commonly known as:  NEURONTIN  Take 1 tablet by mouth 4 times daily for 90 days.      Gauze Pads & Dressings Misc  Please dispense 4x8 guaze, kerlix, and ace     insulin glargine 100 UNIT/ML injection pen  Commonly known as:  BASAGLAR KWIKPEN  Inject 40 Units into the skin 2 times daily     insulin lispro 100 UNIT/ML injection vial  Commonly known as:  HUMALOG     Insulin Pen Needle 31G X 5 MM Misc  1 each by Does not apply route daily     methadone 10 MG tablet  Commonly known as:  DOLOPHINE     metoprolol succinate 50 MG extended release tablet  Commonly known as:  TOPROL XL  Take 1 tablet by mouth nightly     nystatin 461302 UNIT/GM powder  Commonly known as:  MYCOSTATIN     omeprazole 20 MG delayed release capsule  Commonly known as:  PRILOSEC     sodium chloride 0.9 % SOLN 50 mL with ertapenem 1 GM SOLR 1 g         * This list has 4 medication(s) that are the same as other medications prescribed for you. Read the directions carefully, and ask your doctor or other care provider to review them with you. Activity: up as tolerated  Diet: regular  Wound Care: as needed    Time Spent on discharge is more than 45 minutes    Signed:  Tammie Romero, PGY-1  6/12/2019  9:35 AM  Patient seen and examined, labs and imaging studies reviewed, agree with assessment and plan as outlined above. Continue with discharge planning asked to monitor for recurrence of symptoms or new symptoms including but not limited to chest pain shortness of breath nausea vomiting fevers or chills and seek immediate medical attention or call 911. Greater than 30 minutes spent on case on day of discharge. Patient willing and optimistic doing well with reduced doses of both gabapentin and methadone for chronic pain.     Franny Allan MD [address]

## 2019-06-12 NOTE — PROGRESS NOTES
Podiatric Surgery Daily Progress Note  Sol Bare      Subjective :   Patient seen and examined this am at the bedside. Patient denies any acute overnight events. Patient denies N/V/F/C/SOB. Patient denies calf pain, thigh pain, chest pain. Review of Systems: A 12 point review of symptoms is unremarkable with the exception of the chief complaint. Patient specifically denies nausea, fever, vomiting, chills, shortness of breath, chest pain, abdominal pain, constipation or difficulty urinating. Objective     BP (!) 156/77   Pulse 67   Temp 97.2 °F (36.2 °C) (Axillary)   Resp 18   Ht 5' 2\" (1.575 m)   Wt 206 lb 9.1 oz (93.7 kg)   SpO2 98%   BMI 37.78 kg/m²      I/O:    Intake/Output Summary (Last 24 hours) at 6/12/2019 0751  Last data filed at 6/12/2019 0558  Gross per 24 hour   Intake 1030 ml   Output 600 ml   Net 430 ml              Wt Readings from Last 3 Encounters:   06/11/19 206 lb 9.1 oz (93.7 kg)   05/31/19 190 lb (86.2 kg)   03/25/19 188 lb (85.3 kg)       LABS:    Recent Labs     06/11/19  0750 06/11/19  2230 06/12/19  0400   WBC 8.9  --  9.8   HGB 8.3* 7.6* 8.0*   HCT 26.4* 23.6* 24.5*     --  474*        Recent Labs     06/12/19  0400      K 4.4      CO2 27   BUN 37*   CREATININE 1.3*        Recent Labs     06/09/19  1508   PROT 7.8           LOWER EXTREMITY EXAMINATION    Dressing to b/l LE intact. No strikethrough noted to the external dressing. Mild drainage noted to the internal layers of the dressing. VASCULAR:DP/PT pulses palpable 1/4 b/l. CFT is brisk to distal stump RLE, and distal brandy of all digits on the LLE. Skin temp warm to warm proximal to distal b/l LE with no focal calor noted. Mild non-pitting edema noted RLE. No streaking, no lymphangitis.      NEUROLOGIC:Gross and epicritic sensation is diminished b/l. Protective sensation is diminished at all pedal sites b/l.     DERMATOLOGIC: Right LE: Surgical incision noted to the distal TMA stump, and extends plantarly at the lateral aspect. Skin edges are well-coapted with sutures intact. Small area of dehiscence noted to the lateral aspect of the incision. No active drainage noted. No surrounding erythema noted. No purulence, fluctuance, or malodor noted. Left LE: Pinpoint full thickness wound noted sub metatarsal head 1. Wound base is granular. Periwound is hyperkeratotic. Wound does not probe, tract, or undermine. Previous wound noted to 3rd interspace is epithelialized            MUSCULOSKELETAL:No pain on compression of the b/l calf. IMAGING:  Post-operative XR right foot 6/6/19  FINDINGS:       There is transmetatarsal amputation of the fourth and fifth rays. There is also been prior amputation of the phalanges of the first, second and third rays.           Impression       Postoperative changes of recent transmetatarsal amputation as described           ASSESSMENT/PLAN  -s/p TMA, right foot, 6/6/19  -Diabetic foot ulcer, Carpenter I, left foot   -DM type II uncontrolled with peripheral neuroapthy   -History of non-compliance  -Personal history of nicotine dependence    -Patient examined and evaluated at bedside this am   -VSS, no leukocytosis noted  -Surgical dressing on RLE left changed and posterior splint reapplied  -Left LE dressing changed using hydrogel, DSD, surgical shoe  -NON weight bearing on the RIGHT   -HEEL weight bearing in a surgical shoe on the left    DISPO: b/l LE wounds do not appear acutely infected. No surgical intervention planned from podiatry standpoint. Will continue to monitor while in house. Discussed assessment and plan with Dr. Maryanne Everett.     Kathia Yan DPM  PGY-1, Pager #: [phone]

## 2019-06-14 LAB
BLOOD CULTURE, ROUTINE: NORMAL
CULTURE, BLOOD 2: NORMAL

## 2019-06-17 LAB
ALBUMIN SERPL-MCNC: 3 G/DL
ALP BLD-CCNC: 557 U/L
ALT SERPL-CCNC: 18 U/L
ANION GAP SERPL CALCULATED.3IONS-SCNC: NORMAL MMOL/L
AST SERPL-CCNC: 22 U/L
BILIRUB SERPL-MCNC: 0.2 MG/DL (ref 0.1–1.4)
BUN BLDV-MCNC: 31 MG/DL
CALCIUM SERPL-MCNC: 8.9 MG/DL
CHLORIDE BLD-SCNC: 101 MMOL/L
CO2: 32 MMOL/L
CREAT SERPL-MCNC: 1.4 MG/DL
GFR CALCULATED: NORMAL
GLUCOSE BLD-MCNC: 48 MG/DL
HCT VFR BLD CALC: 31.5 % (ref 36–46)
HEMOGLOBIN: 10 G/DL (ref 12–16)
PLATELET # BLD: 536 K/ΜL
POTASSIUM SERPL-SCNC: 4.7 MMOL/L
SODIUM BLD-SCNC: 141 MMOL/L
TOTAL PROTEIN: 6.4
WBC # BLD: 8.6 10^3/ML

## 2019-06-18 ENCOUNTER — TELEPHONE (OUTPATIENT)
Dept: INFECTIOUS DISEASES | Age: 54
End: 2019-06-18

## 2019-06-18 LAB
BUN BLDV-MCNC: 30 MG/DL
CALCIUM SERPL-MCNC: 8.8 MG/DL
CHLORIDE BLD-SCNC: 102 MMOL/L
CO2: 32 MMOL/L
CREAT SERPL-MCNC: 1.4 MG/DL
GFR CALCULATED: NORMAL
GLUCOSE BLD-MCNC: 43 MG/DL
HCT VFR BLD CALC: 27.6 % (ref 36–46)
HEMOGLOBIN: 9 G/DL (ref 12–16)
PLATELET # BLD: 501 K/ΜL
POTASSIUM SERPL-SCNC: 4.5 MMOL/L
SODIUM BLD-SCNC: 141 MMOL/L
WBC # BLD: 8.7 10^3/ML

## 2019-06-18 NOTE — TELEPHONE ENCOUNTER
Spoke with Cherry Ferrer at [facility] and reports that pt came with orders from Dr Chloe Cristina for IV Ceftriaxone 2 gm  Q12HRS   Please advise on clarification - we have pt only getting Ceftriaxone 2 gm once a day. Pt labs 6/17/19 - Alkaline Phos 557  Labs scanned into [employer] for review.

## 2019-07-01 LAB
ALBUMIN SERPL-MCNC: 3.2 G/DL
ALP BLD-CCNC: 201 U/L
ALT SERPL-CCNC: 10 U/L
ANION GAP SERPL CALCULATED.3IONS-SCNC: 1.2 MMOL/L
AST SERPL-CCNC: 11 U/L
BASOPHILS ABSOLUTE: 0.2 /ΜL
BASOPHILS RELATIVE PERCENT: 2.5 %
BILIRUB SERPL-MCNC: 0.2 MG/DL (ref 0.1–1.4)
BUN BLDV-MCNC: 38 MG/DL
C-REACTIVE PROTEIN: 4.6
CALCIUM SERPL-MCNC: 9.2 MG/DL
CHLORIDE BLD-SCNC: 100 MMOL/L
CO2: 33 MMOL/L
CREAT SERPL-MCNC: 1.6 MG/DL
EOSINOPHILS ABSOLUTE: 0.3 /ΜL
EOSINOPHILS RELATIVE PERCENT: 4.9 %
GFR CALCULATED: 40
GLUCOSE BLD-MCNC: 219 MG/DL
HCT VFR BLD CALC: 28.1 % (ref 36–46)
HEMOGLOBIN: 9.1 G/DL (ref 12–16)
LYMPHOCYTES ABSOLUTE: 1.9 /ΜL
LYMPHOCYTES RELATIVE PERCENT: 29 %
MCH RBC QN AUTO: 28 PG
MCHC RBC AUTO-ENTMCNC: 32.3 G/DL
MCV RBC AUTO: 86.7 FL
MONOCYTES ABSOLUTE: 0.3 /ΜL
MONOCYTES RELATIVE PERCENT: 4.3 %
NEUTROPHILS ABSOLUTE: 3.9 /ΜL
NEUTROPHILS RELATIVE PERCENT: 59.3 %
PDW BLD-RTO: 15.7 %
PLATELET # BLD: 257 K/ΜL
PMV BLD AUTO: 8.5 FL
POTASSIUM SERPL-SCNC: 4.5 MMOL/L
RBC # BLD: 3.25 10^6/ΜL
SEDIMENTATION RATE, ERYTHROCYTE: 27
SODIUM BLD-SCNC: 139 MMOL/L
TOTAL PROTEIN: 5.8
WBC # BLD: 6.6 10^3/ML

## 2019-07-03 ENCOUNTER — ANESTHESIA EVENT (OUTPATIENT)
Dept: OPERATING ROOM | Age: 54
End: 2019-07-03
Payer: COMMERCIAL

## 2019-07-03 NOTE — PROGRESS NOTES
Limitation of Treatment order in effect during my hospitalization, the order may or may not be in effect during this procedure. I give my doctor permission to give me blood or blood products. I understand that there are risks with receiving blood such as hepatitis, AIDS, fever, or allergic reaction. I acknowledge that the risks, benefits, and alternatives of this treatment have been explained to me and that no express or implied warranty has been given by the hospital, any blood bank, or any person or entity as to the blood or blood components transfused. At the discretion of my doctor, I agree to allow observers, equipment/product representatives and allow photographing, and/or televising of the procedure, provided my name or identity is maintained confidentially. I agree the hospital may dispose of or use for scientific or educational purposes any tissue, fluid, or body parts which may be removed.     ________________________________Date________Time______ am/pm  ([facility])  Patient or Signature of Closest Relative or Legal Guardian    ________________________________Date________Time______am/pm      Page 1 of  1  Witness

## 2019-07-05 ENCOUNTER — TELEPHONE (OUTPATIENT)
Dept: INFECTIOUS DISEASES | Age: 54
End: 2019-07-05

## 2019-07-05 ENCOUNTER — HOSPITAL ENCOUNTER (OUTPATIENT)
Age: 54
Setting detail: OUTPATIENT SURGERY
Discharge: HOME | End: 2019-07-05
Attending: PODIATRIST | Admitting: PODIATRIST
Payer: COMMERCIAL

## 2019-07-05 ENCOUNTER — ANESTHESIA (OUTPATIENT)
Dept: OPERATING ROOM | Age: 54
End: 2019-07-05
Payer: COMMERCIAL

## 2019-07-05 VITALS
SYSTOLIC BLOOD PRESSURE: 183 MMHG | RESPIRATION RATE: 20 BRPM | HEIGHT: 62 IN | DIASTOLIC BLOOD PRESSURE: 90 MMHG | BODY MASS INDEX: 34.96 KG/M2 | WEIGHT: 190 LBS | OXYGEN SATURATION: 97 % | HEART RATE: 77 BPM | TEMPERATURE: 97.4 F

## 2019-07-05 VITALS — SYSTOLIC BLOOD PRESSURE: 150 MMHG | DIASTOLIC BLOOD PRESSURE: 70 MMHG | OXYGEN SATURATION: 100 %

## 2019-07-05 LAB
ANION GAP SERPL CALCULATED.3IONS-SCNC: 7 MMOL/L (ref 3–16)
BASOPHILS ABSOLUTE: 0 K/UL (ref 0–0.2)
BASOPHILS RELATIVE PERCENT: 0.6 %
BUN BLDV-MCNC: 37 MG/DL (ref 7–20)
CALCIUM SERPL-MCNC: 9.3 MG/DL (ref 8.3–10.6)
CHLORIDE BLD-SCNC: 104 MMOL/L (ref 99–110)
CO2: 30 MMOL/L (ref 21–32)
CREAT SERPL-MCNC: 1.3 MG/DL (ref 0.6–1.1)
EOSINOPHILS ABSOLUTE: 0.3 K/UL (ref 0–0.6)
EOSINOPHILS RELATIVE PERCENT: 5.1 %
GFR AFRICAN AMERICAN: 51
GFR NON-AFRICAN AMERICAN: 42
GLUCOSE BLD-MCNC: 103 MG/DL (ref 70–99)
GLUCOSE BLD-MCNC: 161 MG/DL (ref 70–99)
GLUCOSE BLD-MCNC: 170 MG/DL (ref 70–99)
HCT VFR BLD CALC: 27.5 % (ref 36–48)
HEMOGLOBIN: 9.2 G/DL (ref 12–16)
LYMPHOCYTES ABSOLUTE: 1.8 K/UL (ref 1–5.1)
LYMPHOCYTES RELATIVE PERCENT: 30 %
MCH RBC QN AUTO: 28.4 PG (ref 26–34)
MCHC RBC AUTO-ENTMCNC: 33.3 G/DL (ref 31–36)
MCV RBC AUTO: 85.4 FL (ref 80–100)
MONOCYTES ABSOLUTE: 0.4 K/UL (ref 0–1.3)
MONOCYTES RELATIVE PERCENT: 6.1 %
NEUTROPHILS ABSOLUTE: 3.5 K/UL (ref 1.7–7.7)
NEUTROPHILS RELATIVE PERCENT: 58.2 %
PDW BLD-RTO: 16 % (ref 12.4–15.4)
PERFORMED ON: ABNORMAL
PERFORMED ON: ABNORMAL
PLATELET # BLD: 210 K/UL (ref 135–450)
PMV BLD AUTO: 8.7 FL (ref 5–10.5)
POTASSIUM SERPL-SCNC: 4.8 MMOL/L (ref 3.5–5.1)
RBC # BLD: 3.22 M/UL (ref 4–5.2)
SODIUM BLD-SCNC: 141 MMOL/L (ref 136–145)
WBC # BLD: 6.1 K/UL (ref 4–11)

## 2019-07-05 PROCEDURE — 7100000011 HC PHASE II RECOVERY - ADDTL 15 MIN: Performed by: PODIATRIST

## 2019-07-05 PROCEDURE — 3600000002 HC SURGERY LEVEL 2 BASE: Performed by: PODIATRIST

## 2019-07-05 PROCEDURE — 3700000000 HC ANESTHESIA ATTENDED CARE: Performed by: PODIATRIST

## 2019-07-05 PROCEDURE — C1713 ANCHOR/SCREW BN/BN,TIS/BN: HCPCS | Performed by: PODIATRIST

## 2019-07-05 PROCEDURE — 6360000002 HC RX W HCPCS: Performed by: NURSE ANESTHETIST, CERTIFIED REGISTERED

## 2019-07-05 PROCEDURE — 7100000000 HC PACU RECOVERY - FIRST 15 MIN: Performed by: PODIATRIST

## 2019-07-05 PROCEDURE — 2720000010 HC SURG SUPPLY STERILE: Performed by: PODIATRIST

## 2019-07-05 PROCEDURE — 85025 COMPLETE CBC W/AUTO DIFF WBC: CPT

## 2019-07-05 PROCEDURE — 3700000001 HC ADD 15 MINUTES (ANESTHESIA): Performed by: PODIATRIST

## 2019-07-05 PROCEDURE — 3600000012 HC SURGERY LEVEL 2 ADDTL 15MIN: Performed by: PODIATRIST

## 2019-07-05 PROCEDURE — 7100000001 HC PACU RECOVERY - ADDTL 15 MIN: Performed by: PODIATRIST

## 2019-07-05 PROCEDURE — 2709999900 HC NON-CHARGEABLE SUPPLY: Performed by: PODIATRIST

## 2019-07-05 PROCEDURE — 2500000003 HC RX 250 WO HCPCS: Performed by: PODIATRIST

## 2019-07-05 PROCEDURE — 7100000010 HC PHASE II RECOVERY - FIRST 15 MIN: Performed by: PODIATRIST

## 2019-07-05 PROCEDURE — 2580000003 HC RX 258: Performed by: PODIATRIST

## 2019-07-05 PROCEDURE — 2580000003 HC RX 258: Performed by: NURSE ANESTHETIST, CERTIFIED REGISTERED

## 2019-07-05 PROCEDURE — 80048 BASIC METABOLIC PNL TOTAL CA: CPT

## 2019-07-05 PROCEDURE — 6370000000 HC RX 637 (ALT 250 FOR IP): Performed by: ANESTHESIOLOGY

## 2019-07-05 DEVICE — ALLOGRAFT HUM TISS 1 CC AMNION AMNIFLO CRYOPRESERVED: Type: IMPLANTABLE DEVICE | Site: FOOT | Status: FUNCTIONAL

## 2019-07-05 DEVICE — GRAFT HUM TISS W3XL6CM CRYOPRESERVED PLCNTA TISS UMB AMNION: Type: IMPLANTABLE DEVICE | Site: FOOT | Status: FUNCTIONAL

## 2019-07-05 RX ORDER — PROPOFOL 10 MG/ML
INJECTION, EMULSION INTRAVENOUS PRN
Status: DISCONTINUED | OUTPATIENT
Start: 2019-07-05 | End: 2019-07-05 | Stop reason: SDUPTHER

## 2019-07-05 RX ORDER — MIDAZOLAM HYDROCHLORIDE 1 MG/ML
INJECTION INTRAMUSCULAR; INTRAVENOUS PRN
Status: DISCONTINUED | OUTPATIENT
Start: 2019-07-05 | End: 2019-07-05 | Stop reason: SDUPTHER

## 2019-07-05 RX ORDER — LIDOCAINE HYDROCHLORIDE 20 MG/ML
INJECTION, SOLUTION INTRAVENOUS PRN
Status: DISCONTINUED | OUTPATIENT
Start: 2019-07-05 | End: 2019-07-05 | Stop reason: SDUPTHER

## 2019-07-05 RX ORDER — PROMETHAZINE HYDROCHLORIDE 25 MG/ML
6.25 INJECTION, SOLUTION INTRAMUSCULAR; INTRAVENOUS
Status: DISCONTINUED | OUTPATIENT
Start: 2019-07-05 | End: 2019-07-05 | Stop reason: HOSPADM

## 2019-07-05 RX ORDER — 0.9 % SODIUM CHLORIDE 0.9 %
500 INTRAVENOUS SOLUTION INTRAVENOUS
Status: DISCONTINUED | OUTPATIENT
Start: 2019-07-05 | End: 2019-07-05 | Stop reason: HOSPADM

## 2019-07-05 RX ORDER — MEPERIDINE HYDROCHLORIDE 25 MG/ML
12.5 INJECTION INTRAMUSCULAR; INTRAVENOUS; SUBCUTANEOUS EVERY 5 MIN PRN
Status: DISCONTINUED | OUTPATIENT
Start: 2019-07-05 | End: 2019-07-05 | Stop reason: HOSPADM

## 2019-07-05 RX ORDER — LIDOCAINE HYDROCHLORIDE 20 MG/ML
INJECTION, SOLUTION INFILTRATION; PERINEURAL PRN
Status: DISCONTINUED | OUTPATIENT
Start: 2019-07-05 | End: 2019-07-05 | Stop reason: ALTCHOICE

## 2019-07-05 RX ORDER — OXYCODONE HYDROCHLORIDE AND ACETAMINOPHEN 5; 325 MG/1; MG/1
1 TABLET ORAL ONCE
Status: COMPLETED | OUTPATIENT
Start: 2019-07-05 | End: 2019-07-05

## 2019-07-05 RX ORDER — SODIUM CHLORIDE 9 MG/ML
INJECTION, SOLUTION INTRAVENOUS CONTINUOUS PRN
Status: DISCONTINUED | OUTPATIENT
Start: 2019-07-05 | End: 2019-07-05 | Stop reason: SDUPTHER

## 2019-07-05 RX ORDER — METHADONE HYDROCHLORIDE 10 MG/1
10 TABLET ORAL DAILY
Status: ON HOLD | COMMUNITY
End: 2019-10-23 | Stop reason: SDUPTHER

## 2019-07-05 RX ORDER — MAGNESIUM HYDROXIDE 1200 MG/15ML
LIQUID ORAL CONTINUOUS PRN
Status: COMPLETED | OUTPATIENT
Start: 2019-07-05 | End: 2019-07-05

## 2019-07-05 RX ORDER — HYDRALAZINE HYDROCHLORIDE 20 MG/ML
5 INJECTION INTRAMUSCULAR; INTRAVENOUS EVERY 10 MIN PRN
Status: DISCONTINUED | OUTPATIENT
Start: 2019-07-05 | End: 2019-07-05 | Stop reason: HOSPADM

## 2019-07-05 RX ORDER — ONDANSETRON 2 MG/ML
4 INJECTION INTRAMUSCULAR; INTRAVENOUS EVERY 30 MIN PRN
Status: DISCONTINUED | OUTPATIENT
Start: 2019-07-05 | End: 2019-07-05 | Stop reason: HOSPADM

## 2019-07-05 RX ORDER — FENTANYL CITRATE 50 UG/ML
25 INJECTION, SOLUTION INTRAMUSCULAR; INTRAVENOUS EVERY 5 MIN PRN
Status: DISCONTINUED | OUTPATIENT
Start: 2019-07-05 | End: 2019-07-05 | Stop reason: HOSPADM

## 2019-07-05 RX ORDER — SODIUM CHLORIDE, SODIUM LACTATE, POTASSIUM CHLORIDE, CALCIUM CHLORIDE 600; 310; 30; 20 MG/100ML; MG/100ML; MG/100ML; MG/100ML
INJECTION, SOLUTION INTRAVENOUS CONTINUOUS
Status: DISCONTINUED | OUTPATIENT
Start: 2019-07-05 | End: 2019-07-05 | Stop reason: HOSPADM

## 2019-07-05 RX ORDER — OXYCODONE HYDROCHLORIDE AND ACETAMINOPHEN 5; 325 MG/1; MG/1
1 TABLET ORAL ONCE
Status: DISCONTINUED | OUTPATIENT
Start: 2019-07-05 | End: 2019-07-05 | Stop reason: HOSPADM

## 2019-07-05 RX ORDER — LABETALOL 20 MG/4 ML (5 MG/ML) INTRAVENOUS SYRINGE
5 EVERY 10 MIN PRN
Status: DISCONTINUED | OUTPATIENT
Start: 2019-07-05 | End: 2019-07-05 | Stop reason: HOSPADM

## 2019-07-05 RX ORDER — DIPHENHYDRAMINE HYDROCHLORIDE 50 MG/ML
12.5 INJECTION INTRAMUSCULAR; INTRAVENOUS
Status: DISCONTINUED | OUTPATIENT
Start: 2019-07-05 | End: 2019-07-05 | Stop reason: HOSPADM

## 2019-07-05 RX ADMIN — PROPOFOL 20 MG: 10 INJECTION, EMULSION INTRAVENOUS at 13:47

## 2019-07-05 RX ADMIN — LIDOCAINE HYDROCHLORIDE 100 MG: 20 INJECTION, SOLUTION INTRAVENOUS at 13:17

## 2019-07-05 RX ADMIN — SODIUM CHLORIDE: 900 INJECTION, SOLUTION INTRAVENOUS at 13:11

## 2019-07-05 RX ADMIN — PROPOFOL 20 MG: 10 INJECTION, EMULSION INTRAVENOUS at 13:28

## 2019-07-05 RX ADMIN — PROPOFOL 20 MG: 10 INJECTION, EMULSION INTRAVENOUS at 13:51

## 2019-07-05 RX ADMIN — MIDAZOLAM HYDROCHLORIDE 1 MG: 2 INJECTION, SOLUTION INTRAMUSCULAR; INTRAVENOUS at 13:06

## 2019-07-05 RX ADMIN — PROPOFOL 20 MG: 10 INJECTION, EMULSION INTRAVENOUS at 13:42

## 2019-07-05 RX ADMIN — PROPOFOL 50 MG: 10 INJECTION, EMULSION INTRAVENOUS at 13:22

## 2019-07-05 RX ADMIN — OXYCODONE HYDROCHLORIDE AND ACETAMINOPHEN 1 TABLET: 5; 325 TABLET ORAL at 15:30

## 2019-07-05 RX ADMIN — PROPOFOL 20 MG: 10 INJECTION, EMULSION INTRAVENOUS at 13:32

## 2019-07-05 RX ADMIN — PROPOFOL 20 MG: 10 INJECTION, EMULSION INTRAVENOUS at 13:56

## 2019-07-05 RX ADMIN — PROPOFOL 20 MG: 10 INJECTION, EMULSION INTRAVENOUS at 13:37

## 2019-07-05 RX ADMIN — PROPOFOL 150 MG: 10 INJECTION, EMULSION INTRAVENOUS at 13:18

## 2019-07-05 ASSESSMENT — PULMONARY FUNCTION TESTS
PIF_VALUE: 0
PIF_VALUE: 3
PIF_VALUE: 0
PIF_VALUE: 2
PIF_VALUE: 0
PIF_VALUE: 1
PIF_VALUE: 0
PIF_VALUE: 2
PIF_VALUE: 0
PIF_VALUE: 1
PIF_VALUE: 0
PIF_VALUE: 0

## 2019-07-05 ASSESSMENT — PAIN DESCRIPTION - DESCRIPTORS: DESCRIPTORS: ACHING

## 2019-07-05 ASSESSMENT — PAIN SCALES - GENERAL
PAINLEVEL_OUTOF10: 0
PAINLEVEL_OUTOF10: 4
PAINLEVEL_OUTOF10: 4

## 2019-07-05 ASSESSMENT — PAIN DESCRIPTION - ONSET: ONSET: ON-GOING

## 2019-07-05 ASSESSMENT — PAIN DESCRIPTION - PROGRESSION
CLINICAL_PROGRESSION: NOT CHANGED
CLINICAL_PROGRESSION: NOT CHANGED

## 2019-07-05 ASSESSMENT — PAIN DESCRIPTION - ORIENTATION: ORIENTATION: RIGHT

## 2019-07-05 ASSESSMENT — PAIN DESCRIPTION - LOCATION: LOCATION: FOOT

## 2019-07-05 ASSESSMENT — PAIN DESCRIPTION - FREQUENCY: FREQUENCY: CONTINUOUS

## 2019-07-05 ASSESSMENT — PAIN - FUNCTIONAL ASSESSMENT: PAIN_FUNCTIONAL_ASSESSMENT: 0-10

## 2019-07-05 ASSESSMENT — PAIN DESCRIPTION - PAIN TYPE: TYPE: ACUTE PAIN;SURGICAL PAIN

## 2019-07-05 ASSESSMENT — LIFESTYLE VARIABLES: SMOKING_STATUS: 0

## 2019-07-05 NOTE — PROGRESS NOTES
1505:  [facility] from PACU alert; speech clear; breathing easily on RA; denies any nausea; states pain in new surgical site right foot 4/10, \"aching\"; Pt's /90 and still within same range after recheck; Pt states \"it's only going to get higher because I'm didn't get my meds -- methadone this morning. \"  Right foot completely wrapped in Ace wrap without any toes exposed, dsg CDI. In a post-op boot;  Elevated; Pt states she had a drink and iqra crackers in PACU; declined offer for more crackers or drink. 1515:  Called Dr. Maria Victoria Stack, anesthesiology, re: increased BP and pain; okay'd to give pt one Percocet;    [phone]:  Pt taken to bathroom and voided; [facility] ([phone]) and was told service would pick pt up in 30 minutes. 1530:  Medicated pt with percocet on return from bathroom. Pt stated \"it's not going to do anything. \" Pt agitated verbally. 1555:  Recheck BP and was 183/90; Pt again states \"it won't get any better until I get my meds. \"  No change in pain; Perfectserved Dr. Melchor Clarke, Podiatry resident, to ask when pt could restart Eliquis, and she returned call and stated \"tomorrow. \" this RN called report to nurse Sanjeev Ruelas at [facility] where pt is to return, and she verbalized understanding of instructions and that Eliquis is to be started tomorrow;  Rechecked pt's BP, and same is 183/90; Pt states again,\"it's only going to go up and up because I'm agitated. \"    [phone]:  Pt dressed and sitting in Wheelchair with leg elevated and ice pack to foot; declined offer of pillow; pt declined offer of more crackers and ok'd drink; This RN called [address] again and now stating ETA \"25 minutes. \"  Notified pt and pt becoming even more agitated and states \"I'm gonna call someone else to come get me\". Refused FSBS (pt is diabetic);     1638:  Pt's daughter called here to speak with pt, and pt was talking to daughter stating she was hungry and angry and \"just so mad. \"  This RN offered pt more food, such as a box lunch, but pt refused. Pt stated \"I'm about to walk out of here. \"  [address] [facility]. 1700: [facility] arrived:    Ambulatory Surgery/Procedure Discharge Note    Vitals:    07/05/19 1555   BP: (!) 183/90   Pulse: 77   Resp: 20   Temp:    SpO2: 97%   1505:  Temp 97.4;      Pt's two BP's 174/90 and 183/90 within 20 to 50% of preop BP of 151/92; Pt stated in earlier notes that no medication would work for her BP or pain until she got her methadone at [facility]; No intake/output data recorded. Pt voided X 2; ate iqra crackers and drank diet Arlene mist and water    Restroom use offered before discharge.   Yes -- Pt Urinated X 2    Pain assessment:  present - adequately treated and location, right foot  Pain Level: 4    Please see above notes for chronology of events     At 1700, Patient discharged to [place] [id number], who arrived to take pt via stretcher to [facility] for continued rehab      7/5/2019 5:32 PM

## 2019-07-05 NOTE — PROGRESS NOTES
Pt came from [facility] ambulance [phone].   Pt states took her elquis 7/4 pm.

## 2019-07-05 NOTE — H&P
Ethyl Hamming    [address] [place] [address] Same Day Surgery Update H & P  Department of General Surgery   Surgical Service   Pre-operative History and Physical  Last H & P within the last 30 days. DIAGNOSIS:   DIABETIC FOOT ULCER    PROCEDURE:  HI DRAIN SKIN ABSCESS COMPLIC [59022] (FOOT DEBRIDEMENT INCISION AND DRAINAGE)     HISTORY OF PRESENT ILLNESS:    Patient with diabetic foot ulcer of the right foot with associated drainage presents today for the above procedure.       Past Medical History:        Diagnosis Date    Amenorrhea     Anomalies of nails     Asthma 04    Athlete's foot 2010    Bacterial vaginosis 2008    Carpal tunnel syndrome 2007    [facility]     Diabetes mellitus type II 2007    Diabetic neuropathy ([facility] 8/10    DVT (deep venous thrombosis) ([facility] 3/2004    Dyslipidemia 2009    Dyspareunia 2009    ETOH abuse 3/04/2007    Feet clawing     HTN (hypertension)     Hx of blood clots     Hyperlipidemia     MRSA (methicillin resistant staph aureus) culture positive 2017; 2017    foot; leg     Neuropathy 2009    polyneuropathy    Pain, back 2008    Pain, eye, right 04    Pancreatitis 04    Pseudocyst, pancreas 04    Scalp lesion 2007    Tinea pedis     Tobacco abuse 2008    Vaginal bleeding, abnormal 2007     Past Surgical History:        Procedure Laterality Date     SECTION  unknown    FOOT DEBRIDEMENT Right 2019    INCISION AND DRAINAGE WITH APPLICATION OF STRAVIX GRAFT RIGHT FOOT performed by Haylie Amaro DPM at [address] Right 2019    RIGHT FOOT INCISION AND DRAINAGE WITH STAGING TRANSMETATARSAL AMPUTATION performed by Haylie Amaro DPM at [facility] Left 2016    I & D left foot    OTHER SURGICAL HISTORY Right 10/20/2017    RIGHT GASTROC LENGTHENING ENDOSCOPIC, INJECTION OF AMNI GRAFT    OTHER Fear of current or ex partner: Not on file     Emotionally abused: Not on file     Physically abused: Not on file     Forced sexual activity: Not on file   Other Topics Concern    Not on file   Social History Narrative    Not on file         Medications Prior to Admission:      Prior to Admission medications    Medication Sig Start Date End Date Taking? Authorizing Provider   aspirin EC 81 MG EC tablet Take 1 tablet by mouth daily 6/12/19   Mirian Schmidt MD   gabapentin (NEURONTIN) 400 MG capsule Take 1 capsule by mouth 2 times daily for 30 days. 6/12/19 7/12/19  Mirian Schmidt MD   insulin glargine (LANTUS) 100 UNIT/ML injection pen Inject 50 Units into the skin daily 6/13/19   Mirian Schmidt MD   apixaban (ELIQUIS) 5 MG TABS tablet Take 5 mg by mouth 2 times daily 10mg BID 6/7/19-6/14/19, then 5mg daily starting 6/15/19 6/15/19   Historical Provider, MD   nystatin (MYCOSTATIN) 862447 UNIT/GM powder Apply topically 2 times daily Apply to right breast and groin area BID    Historical Provider, MD   insulin lispro (HUMALOG) 100 UNIT/ML injection vial Inject 3 Units into the skin 3 times daily (with meals)    Historical Provider, MD   omeprazole (PRILOSEC) 20 MG delayed release capsule Take 20 mg by mouth every morning    Historical Provider, MD   ammonium lactate (LAC-HYDRIN) 12 % lotion Apply topically daily.  5/17/19   Bryanna Almanzar DPM   amitriptyline (ELAVIL) 100 MG tablet Take 1 tablet by mouth nightly 5/6/19   Reinier Celestin MD   metoprolol succinate (TOPROL XL) 50 MG extended release tablet Take 1 tablet by mouth nightly 3/27/19   Karlynn Severs, MD   albuterol sulfate  (90 Base) MCG/ACT inhaler Inhale 2 puffs into the lungs every 6 hours as needed for Wheezing 2/19/19   Irina Rios MD   atorvastatin (LIPITOR) 20 MG tablet Take 1 tablet by mouth daily  Patient taking differently: Take 20 mg by mouth nightly  2/19/19   Irina Rios MD   Lancets MISC 1 each by Does not apply route 2 times daily PHARMACY MAY SUBSTITUTE TO TRUE METRIX LANCETS 2/19/19   Irina Rios MD   Insulin Pen Needle 31G X 5 MM MISC 1 each by Does not apply route daily 2/19/19   Irina Rios MD   furosemide (LASIX) 40 MG tablet Take 1 tablet by mouth 2 times daily 2/11/19   Irina Rios MD   blood glucose test strips (TRUE METRIX BLOOD GLUCOSE TEST) strip 1 each by In Vitro route 2 times daily As needed. 7/23/18   Irina Rios MD   Gauze Pads & Dressings MISC Please dispense 4x8 guaze, kerlix, and ace 2/23/18   Adline Bile, DPM   Accu-Chek Softclix Lancets MISC 1 strip by Does not apply route 2 times daily Check before breakfast and before going to bed 4/20/17   Irina Rios MD         Allergies:  Sulfa antibiotics    PHYSICAL EXAM:      BP (!) 151/72   Pulse 68   Temp 97.9 °F (36.6 °C) (Oral)   Resp 18   Ht 5' 2\" (1.575 m)   Wt 190 lb (86.2 kg)   SpO2 94%   BMI 34.75 kg/m²      Heart:  regular rate and rhythm    Lungs:  No increased work of breathing, good air exchange, clear to auscultation bilaterally, no crackles or wheezing    Abdomen:  soft, non-distended, non-tender, no rebound tenderness or guarding, normal active bowel sounds and no masses palpated    ASSESSMENT AND PLAN:    1. Patient seen and focused exam done today- no new changes since last physical exam on 6/12/19    2. Access to ancillary services are available per request of the provider.     Reza Hampton, ALEJANDRO - CNP     7/5/2019

## 2019-07-05 NOTE — ANESTHESIA PRE PROCEDURE
Department of Anesthesiology  Preprocedure Note       Name:  Charleen Bhakta   Age:  52 y.o.  :  1965                                          MRN:  [id number]         Date:  2019      Surgeon: Cintia Tan):  Ana Thorne DPM    Procedure: RIGHT FOOT DEBRIDEMENT INCISION AND DRAINAGE, OPEN DIABETIC FOOT ULCER WITH GRAFT PLACEMENT (Right )    Medications prior to admission:   Prior to Admission medications    Medication Sig Start Date End Date Taking? Authorizing Provider   methadone (DOLOPHINE) 10 MG tablet Take 10 mg by mouth daily. Indications: takes 12 tabs every am    Historical Provider, MD   aspirin EC 81 MG EC tablet Take 1 tablet by mouth daily 19   Deven Vences MD   gabapentin (NEURONTIN) 400 MG capsule Take 1 capsule by mouth 2 times daily for 30 days. 19  Deven Vences MD   insulin glargine (LANTUS) 100 UNIT/ML injection pen Inject 50 Units into the skin daily 19   Deven Vences MD   apixaban (ELIQUIS) 5 MG TABS tablet Take 5 mg by mouth 2 times daily 10mg BID 19-19, then 5mg daily starting 6/15/19 6/15/19   Historical Provider, MD   nystatin (MYCOSTATIN) 649313 UNIT/GM powder Apply topically 2 times daily Apply to right breast and groin area BID    Historical Provider, MD   insulin lispro (HUMALOG) 100 UNIT/ML injection vial Inject 3 Units into the skin 3 times daily (with meals)    Historical Provider, MD   omeprazole (PRILOSEC) 20 MG delayed release capsule Take 20 mg by mouth every morning    Historical Provider, MD   ammonium lactate (LAC-HYDRIN) 12 % lotion Apply topically daily.  19   Asmita Joiner DPM   amitriptyline (ELAVIL) 100 MG tablet Take 1 tablet by mouth nightly 19   Abril Boyd MD   metoprolol succinate (TOPROL XL) 50 MG extended release tablet Take 1 tablet by mouth nightly 3/27/19   Eric Montgomery MD   albuterol sulfate  (90 Base) MCG/ACT inhaler Inhale 2 puffs into the lungs every 6 hours as needed for Wheezing 19 Zulay Lisa MD   atorvastatin (LIPITOR) 20 MG tablet Take 1 tablet by mouth daily  Patient taking differently: Take 20 mg by mouth nightly  2/19/19   Zulay Lisa MD   Lancets MISC 1 each by Does not apply route 2 times daily PHARMACY MAY SUBSTITUTE TO TRUE METRIX LANCETS 2/19/19   Zulay Lisa MD   Insulin Pen Needle 31G X 5 MM MISC 1 each by Does not apply route daily 2/19/19   Zulay Lisa MD   furosemide (LASIX) 40 MG tablet Take 1 tablet by mouth 2 times daily 2/11/19   Zulay Lisa MD   blood glucose test strips (TRUE METRIX BLOOD GLUCOSE TEST) strip 1 each by In Vitro route 2 times daily As needed. 7/23/18   Zulay Lisa MD   Gauze Pads & Dressings MISC Please dispense 4x8 guaze, kerlix, and ace 2/23/18   Gardenia Andrés, DPM   Accu-Chek Softclix Lancets MISC 1 strip by Does not apply route 2 times daily Check before breakfast and before going to bed 4/20/17   Zulay Lisa MD       Current medications:    No current facility-administered medications for this visit. No current outpatient medications on file.      Facility-Administered Medications Ordered in Other Visits   Medication Dose Route Frequency Provider Last Rate Last Dose    lactated ringers infusion   Intravenous Continuous Hedy Andrade MD        sodium chloride 0.9 % irrigation    Continuous PRN Ardelia Pallavi, DPM   1,000 mL at 07/05/19 1335    sodium chloride 0.9 % irrigation    Continuous PRN Ardelia Pallavi, DPM   1,000 mL at 07/05/19 1335    lidocaine 2 % injection    PRN Marilu Vilchisi, DPM   10 mL at 07/05/19 1335    fentaNYL (SUBLIMAZE) injection 25 mcg  25 mcg Intravenous Q5 Min PRN Siri Watkins MD        HYDROmorphone (DILAUDID) injection 0.5 mg  0.5 mg Intravenous Q5 Min PRN Siri Watkins MD        HYDROmorphone (DILAUDID) injection 0.25 mg  0.25 mg Intravenous Q5 Min PRN Siri Watkins MD        HYDROmorphone (DILAUDID) injection 0.5 mg  0.5 mg Intravenous Q5 Min PRN Siri Watkins, or equivalent per week     Comment: hx of etoh abuse, denies recent etoh use                                Ready to quit: Not Answered  Counseling given: Not Answered  Comment: 1-2 cig/day      Vital Signs (Current): There were no vitals filed for this visit.                                            BP Readings from Last 3 Encounters:   07/05/19 (!) 174/90   06/12/19 (!) 172/83   06/07/19 114/72       NPO Status:                                                                                 BMI:   Wt Readings from Last 3 Encounters:   07/05/19 190 lb (86.2 kg)   06/11/19 206 lb 9.1 oz (93.7 kg)   05/31/19 190 lb (86.2 kg)     There is no height or weight on file to calculate BMI.    CBC:   Lab Results   Component Value Date    WBC 6.1 07/05/2019    RBC 3.22 07/05/2019    HGB 9.2 07/05/2019    HCT 27.5 07/05/2019    MCV 85.4 07/05/2019    RDW 16.0 07/05/2019     07/05/2019       CMP:   Lab Results   Component Value Date     07/05/2019    K 4.8 07/05/2019    K 4.4 06/12/2019     07/05/2019    CO2 30 07/05/2019    BUN 37 07/05/2019    CREATININE 1.3 07/05/2019    GFRAA 51 07/05/2019    GFRAA 98 10/31/2012    AGRATIO 0.8 06/09/2019    LABGLOM 42 07/05/2019    GLUCOSE 161 07/05/2019    PROT 7.8 06/09/2019    PROT 6.6 07/21/2011    CALCIUM 9.3 07/05/2019    BILITOT 0.3 06/09/2019    ALKPHOS 603 06/09/2019    AST 16 06/09/2019    ALT 16 06/09/2019       POC Tests:   Recent Labs     07/05/19  1415   POCGLU 103*       Coags:   Lab Results   Component Value Date    PROTIME 11.2 06/04/2019    INR 0.98 06/04/2019    APTT 28.1 04/23/2018       HCG (If Applicable):   Lab Results   Component Value Date    PREGTESTUR Negative 06/06/2019        ABGs:   Lab Results   Component Value Date    PHART 7.48 07/19/2011    PO2ART 59 07/19/2011    MBP1KOA 49 07/19/2011    VOK3SYN 36 07/19/2011    BEART 10.8 07/19/2011        Type & Screen (If Applicable):  No results found for: LABABO, [address]    Anesthesia Evaluation  Patient summary reviewed and Nursing notes reviewed no history of anesthetic complications:   Airway: Mallampati: III  TM distance: >3 FB   Neck ROM: full  Mouth opening: > = 3 FB Dental: normal exam   (+) upper dentures, lower dentures and edentulous      Pulmonary:Negative Pulmonary ROS and normal exam  breath sounds clear to auscultation  (+) pneumonia: resolved,  COPD:  asthma:     (-) not a current smoker          Patient did not smoke on day of surgery. Cardiovascular:    (+) hypertension: moderate, hyperlipidemia        Rhythm: regular  Rate: normal                    Neuro/Psych:   Negative Neuro/Psych ROS               ROS comment: Diabetic neuropathy   GI/Hepatic/Renal:   (+) morbid obesity     (-) hiatal hernia and GERD      ROS comment: Pseudocyst, pancreas. Endo/Other:    (+) DiabetesType II DM, poorly controlled, using insulin, . ROS comment: MRSA (methicillin resistant staph aureus) culture positive foot; leg   Abdominal:           Vascular: negative vascular ROS.  + DVT, . Anesthesia Plan      general     ASA 3       Induction: intravenous. MIPS: Postoperative opioids intended. Anesthetic plan and risks discussed with patient. Plan discussed with CRNA.     Attending anesthesiologist reviewed and agrees with Paradise Johnson MD   7/5/2019

## 2019-07-05 NOTE — BRIEF OP NOTE
Brief Postoperative Note  ______________________________________________________________    Patient: Campbell Hutson  YOB: 1965  MRN: [id number]  Date of Procedure: 7/5/2019    Pre-Op Diagnosis: DIABETIC FOOT ULCER    Post-Op Diagnosis: Same       Procedure(s):  RIGHT FOOT DEBRIDEMENT INCISION AND DRAINAGE, OPEN DIABETIC FOOT ULCER WITH GRAFT PLACEMENT    Anesthesia: Anesthesia type not filed in the log.     Surgeon(s):  Julia Rodriguez DPM    Assistant(s):Lauren Becerra MS4    Injectables:   10 cc 2% lidocaine plain, pre op  Amniflo      Hemostasis:   Anatomic dissection    Materials:   4-0 nylon  3x5 cm stravix      Estimated Blood Loss (mL): <28 cc     Complications: None    Specimens:   * No specimens in log *    Implants:  * No implants in log *      Drains: * No LDAs found *    Findings: plantar wound measuring approximately 1.5 cm in diameter     Robert Maharaj DPM  Date: 7/5/2019  Time: 2:06 PM

## 2019-07-05 NOTE — PROGRESS NOTES
Patient admitted to PACU # 8 from OR at [address] [place] [address], OPEN DIABETIC FOOT ULCER WITH GRAFT PLACEMENT per Dr. Sofia Sever. Attached to PACU monitoring system and report received from anesthesia provider. Patient was reported to be hemodynamically stable during procedure. Patient drowsy on admission and denied pain.

## 2019-07-08 LAB
FUNGUS (MYCOLOGY) CULTURE: NORMAL
FUNGUS STAIN: NORMAL

## 2019-07-12 ENCOUNTER — TELEPHONE (OUTPATIENT)
Dept: INTERNAL MEDICINE CLINIC | Age: 54
End: 2019-07-12

## 2019-07-15 DIAGNOSIS — E78.5 DYSLIPIDEMIA: ICD-10-CM

## 2019-07-15 DIAGNOSIS — Z79.4 TYPE 2 DIABETES MELLITUS WITH DIABETIC POLYNEUROPATHY, WITH LONG-TERM CURRENT USE OF INSULIN (HCC): ICD-10-CM

## 2019-07-15 DIAGNOSIS — E11.42 TYPE 2 DIABETES MELLITUS WITH DIABETIC POLYNEUROPATHY, WITH LONG-TERM CURRENT USE OF INSULIN (HCC): ICD-10-CM

## 2019-07-15 DIAGNOSIS — G62.9 NEUROPATHY: Primary | ICD-10-CM

## 2019-07-15 RX ORDER — ATORVASTATIN CALCIUM 20 MG/1
20 TABLET, FILM COATED ORAL NIGHTLY
Qty: 30 TABLET | Refills: 1 | Status: SHIPPED | OUTPATIENT
Start: 2019-07-15 | End: 2019-07-15 | Stop reason: SDUPTHER

## 2019-07-15 RX ORDER — ATORVASTATIN CALCIUM 20 MG/1
20 TABLET, FILM COATED ORAL NIGHTLY
Qty: 30 TABLET | Refills: 1 | Status: SHIPPED | OUTPATIENT
Start: 2019-07-15 | End: 2019-08-22 | Stop reason: SDUPTHER

## 2019-07-15 RX ORDER — AMITRIPTYLINE HYDROCHLORIDE 100 MG/1
100 TABLET, FILM COATED ORAL NIGHTLY
Qty: 30 TABLET | Refills: 1 | Status: SHIPPED | OUTPATIENT
Start: 2019-07-15 | End: 2019-07-15 | Stop reason: SDUPTHER

## 2019-07-15 RX ORDER — GABAPENTIN 400 MG/1
400 CAPSULE ORAL 2 TIMES DAILY
Qty: 60 CAPSULE | Refills: 0 | Status: SHIPPED | OUTPATIENT
Start: 2019-07-15 | End: 2019-07-18 | Stop reason: ALTCHOICE

## 2019-07-15 RX ORDER — AMITRIPTYLINE HYDROCHLORIDE 100 MG/1
100 TABLET, FILM COATED ORAL NIGHTLY
Qty: 30 TABLET | Refills: 1 | Status: SHIPPED | OUTPATIENT
Start: 2019-07-15 | End: 2019-08-22 | Stop reason: SDUPTHER

## 2019-07-18 ENCOUNTER — TELEPHONE (OUTPATIENT)
Dept: INTERNAL MEDICINE CLINIC | Age: 54
End: 2019-07-18

## 2019-07-18 ENCOUNTER — OFFICE VISIT (OUTPATIENT)
Dept: INTERNAL MEDICINE CLINIC | Age: 54
End: 2019-07-18
Payer: COMMERCIAL

## 2019-07-18 VITALS
TEMPERATURE: 98.9 F | HEART RATE: 94 BPM | DIASTOLIC BLOOD PRESSURE: 76 MMHG | RESPIRATION RATE: 20 BRPM | SYSTOLIC BLOOD PRESSURE: 163 MMHG | OXYGEN SATURATION: 96 %

## 2019-07-18 DIAGNOSIS — I50.21 ACUTE SYSTOLIC CONGESTIVE HEART FAILURE (HCC): ICD-10-CM

## 2019-07-18 DIAGNOSIS — E11.42 DIABETIC POLYNEUROPATHY ASSOCIATED WITH TYPE 2 DIABETES MELLITUS (HCC): Primary | ICD-10-CM

## 2019-07-18 PROCEDURE — 99213 OFFICE O/P EST LOW 20 MIN: CPT

## 2019-07-18 RX ORDER — GABAPENTIN 400 MG/1
400 CAPSULE ORAL 3 TIMES DAILY
Qty: 90 CAPSULE | Refills: 0 | Status: SHIPPED | OUTPATIENT
Start: 2019-07-18 | End: 2019-08-22 | Stop reason: SDUPTHER

## 2019-07-18 RX ORDER — METFORMIN HYDROCHLORIDE 500 MG/1
500 TABLET, EXTENDED RELEASE ORAL 2 TIMES DAILY
Qty: 90 TABLET | Refills: 1 | Status: SHIPPED | OUTPATIENT
Start: 2019-07-18 | End: 2019-08-22 | Stop reason: SDUPTHER

## 2019-07-18 ASSESSMENT — ENCOUNTER SYMPTOMS: SHORTNESS OF BREATH: 0

## 2019-07-18 NOTE — PROGRESS NOTES
pen Inject 50 Units into the skin daily  Tasneem Phillips MD   apixaban (ELIQUIS) 5 MG TABS tablet Take 5 mg by mouth 2 times daily 10mg BID 6/7/19-6/14/19, then 5mg daily starting 6/15/19  Historical Provider, MD   nystatin (MYCOSTATIN) 992460 UNIT/GM powder Apply topically 2 times daily Apply to right breast and groin area BID  Historical Provider, MD   insulin lispro (HUMALOG) 100 UNIT/ML injection vial Inject 3 Units into the skin 3 times daily (with meals)  Historical Provider, MD   omeprazole (PRILOSEC) 20 MG delayed release capsule Take 20 mg by mouth every morning  Historical Provider, MD   ammonium lactate (LAC-HYDRIN) 12 % lotion Apply topically daily. Janet Anaya DPM   metoprolol succinate (TOPROL XL) 50 MG extended release tablet Take 1 tablet by mouth nightly  Sparkle Quiñonez MD   albuterol sulfate  (90 Base) MCG/ACT inhaler Inhale 2 puffs into the lungs every 6 hours as needed for Wheezing  Louann Still MD   Lancets MISC 1 each by Does not apply route 2 times daily PHARMACY MAY SUBSTITUTE TO TRUE METRIX LANCETS  Louann Still MD   Insulin Pen Needle 31G X 5 MM MISC 1 each by Does not apply route daily  Louann Still MD   furosemide (LASIX) 40 MG tablet Take 1 tablet by mouth 2 times daily  Louann Still MD   blood glucose test strips (TRUE METRIX BLOOD GLUCOSE TEST) strip 1 each by In Vitro route 2 times daily As needed.   Louann Still MD   Gauze Pads & Dressings MISC Please dispense 4x8 guaze kerlix, and ace  Bradley Villavicencio DPM   Accu-Chek Softclix Lancets MISC 1 strip by Does not apply route 2 times daily Check before breakfast and before going to bed  Louann Still MD        Allergies   Allergen Reactions    Sulfa Antibiotics Itching       Past Medical History:   Diagnosis Date    Amenorrhea     Anomalies of nails     Asthma 5/14/04    Athlete's foot 8/2010    Bacterial vaginosis 04/2008    Carpal tunnel syndrome 5/2007    [facility]     Diabetes mellitus type II 2007    Diabetic neuropathy ([facility] 8/10    [facility] 3/2004    Dyslipidemia 2009    Dyspareunia 2009    ETOH abuse 3/04/2007    Feet clawing     HTN (hypertension)     Hx of blood clots     Hyperlipidemia     MRSA (methicillin resistant staph aureus) culture positive 2017; 2017    foot; leg     Neuropathy 2009    polyneuropathy    Pain, back 2008    Pain, eye, right 04    Pancreatitis 04    Pseudocyst, pancreas 04    Scalp lesion 2007    Tinea pedis     Tobacco abuse 2008    Vaginal bleeding, abnormal 2007       Past Surgical History:   Procedure Laterality Date     SECTION  unknown    FOOT DEBRIDEMENT Right 2019    INCISION AND DRAINAGE WITH APPLICATION OF STRAVIX GRAFT RIGHT FOOT performed by Abbey Mast DPM at [address] Right 2019    RIGHT FOOT INCISION AND DRAINAGE WITH STAGING TRANSMETATARSAL AMPUTATION performed by Abbey Mast DPM at [address] Right 2019    RIGHT FOOT DEBRIDEMENT INCISION AND DRAINAGE, OPEN DIABETIC FOOT ULCER WITH GRAFT PLACEMENT performed by Abbey Mast DPM at [place] [address] Left 2016    I & D left foot    OTHER SURGICAL HISTORY Right 10/20/2017    RIGHT GASTROC LENGTHENING ENDOSCOPIC, INJECTION OF AMNI GRAFT    OTHER SURGICAL HISTORY Right 2018    Diabetic foot ulcer I&D w/ integra graft application    NV DEBRIDEMENT, SKIN, SUB-Q TISSUE,MUSCLE,BONE,=<20 SQ CM Right 2018    RIGHT FOOT DEBRIDEMENT INCISION AND DRAINAGE, PARTIAL 5TH RAY AMPUTATION performed by Abbey Mast DPM at [address] / [address]  2/[address]48    cryotherapy done on lesion    TOE AMPUTATION Left 2017    AMPUTATION LEFT GREAT TOE                    Social History     Socioeconomic History    Marital status:      Spouse name: Not on Increase gabapentin to 400 mg TID    3. Congestive heart failure ([facility]  Echo in 2018 showed EF of 50-55%. Patient endorses a history of leg swelling  - Continue Lasix 20 mg BID. 4. Hypertension  Blood pressure 163/76 in the office today. Patient states her BP was lower in rehab. - Continue to encourage diet improvements. Eating 3 meals with low salt content. - Continue current management, readdress at next visit, with changes made to diabetic management routine. 5. Dyslipidemia  - Continue atorvastatin 20 mg  - Consider repeating lipid panel next visit    6. Opioid use disorder ([facility]  -maintenance therapy with methadone, dispensed by [facility]    Return in about 4 weeks (around 8/15/2019). An  electronic signature was used to authenticate this note. --Nu Mak MD on 7/18/2019 at 2:36 PM     Addendum to Resident H& P/Progress note:  I have personally seen,examined and evaluated the patient.  I have reviewed the current history, physical findings, labs and assessment and plan and agree with note as documented by resident MD ( Jermaine Mesa)      Kathrin Stewart MD, [address]

## 2019-07-18 NOTE — PATIENT INSTRUCTIONS
function may need to be checked before you take this medicine);  · high ketone levels in your blood or urine;  · heart disease, congestive heart failure;  · liver disease; or  · if you also use insulin, or other oral diabetes medications. You may develop lactic acidosis, a dangerous build-up of lactic acid in your blood. This may be more likely if you have other medical conditions, a severe infection, chronic alcoholism, or if you are 72 or older. Ask your doctor about your risk. Follow your doctor's instructions about using this medicine if you are pregnant. Blood sugar control is very important during pregnancy, and your dose needs may be different during each trimester of pregnancy. Tell your doctor if you become pregnant while taking metformin. Metformin may stimulate ovulation in a premenopausal woman and may increase the risk of unintended pregnancy. Talk to your doctor about your risk. You should not breast-feed while using this medicine. Metformin should not be given to a child younger than 8years old. Some forms of metformin are not approved for use by anyone younger than 25years old. How should I take metformin? Follow all directions on your prescription label and read all medication guides or instruction sheets. Your doctor may occasionally change your dose. Use the medicine exactly as directed. Take metformin with a meal, unless your doctor tells you otherwise. Some forms of metformin are taken only once daily with the evening meal. Follow your doctor's instructions. Do not crush, chew, or break an extended-release tablet. Swallow it whole. Measure liquid medicine carefully. Use the dosing syringe provided, or use a medicine dose-measuring device (not a kitchen spoon). Some tablets are made with a shell that is not absorbed or melted in the body. Part of this shell may appear in your stool. This is normal and will not make the medicine less effective.   Low blood sugar (hypoglycemia) can symptoms such as:  · unusual muscle pain;  · feeling cold;  · trouble breathing;  · feeling dizzy, light-headed, tired, or very weak;  · stomach pain, vomiting; or  · slow or irregular heart rate. Common side effects may include:  · low blood sugar;  · nausea, upset stomach; or  · diarrhea. This is not a complete list of side effects and others may occur. Call your doctor for medical advice about side effects. You may report side effects to FDA at 3-369-TYM-7278. What other drugs will affect metformin? Many drugs can affect metformin, making this medicine less effective or increasing your risk of lactic acidosis. This includes prescription and over-the-counter medicines, vitamins, and herbal products. Not all possible interactions are listed here. Tell your doctor about all your current medicines and any medicine you start or stop using. Where can I get more information? Your pharmacist can provide more information about metformin. Remember, keep this and all other medicines out of the reach of children, never share your medicines with others, and use this medication only for the indication prescribed. Every effort has been made to ensure that the information provided by [address] is accurate, up-to-date, and complete, but no guarantee is made to that effect. Drug information contained herein may be time sensitive. [employer] information has been compiled for use by healthcare practitioners and consumers in the [place] and therefore [employer] does not warrant that uses outside of the [place] are appropriate, unless specifically indicated otherwise. [facility]'s drug information does not endorse drugs, diagnose patients or recommend therapy.  [employer] drug information is an informational resource designed to assist licensed healthcare practitioners in caring for their patients and/or to serve consumers viewing this service as a supplement to, and not a substitute for, the expertise,

## 2019-07-19 ENCOUNTER — OFFICE VISIT (OUTPATIENT)
Dept: INTERNAL MEDICINE CLINIC | Age: 54
End: 2019-07-19
Payer: COMMERCIAL

## 2019-07-19 DIAGNOSIS — G62.9 NEUROPATHY: Primary | ICD-10-CM

## 2019-07-19 DIAGNOSIS — S91.302D OPEN WOUND OF LEFT FOOT, SUBSEQUENT ENCOUNTER: ICD-10-CM

## 2019-07-19 DIAGNOSIS — Z98.890 S/P FOOT SURGERY, RIGHT: ICD-10-CM

## 2019-07-19 DIAGNOSIS — E11.42 DIABETIC POLYNEUROPATHY ASSOCIATED WITH TYPE 2 DIABETES MELLITUS (HCC): ICD-10-CM

## 2019-07-19 PROCEDURE — 99213 OFFICE O/P EST LOW 20 MIN: CPT | Performed by: PODIATRIST

## 2019-07-19 NOTE — PROGRESS NOTES
Department of Podiatry  Resident Progress Note    Juan Carlos Brooks  Allergies: Sulfa antibiotics    SUBJECTIVE  The patient is a 52 y.o. female who presents to clinic today for f/u wound check to bilateral feet. Patient states that she has been doing better since last being seen. Patient states that she has been performing daily dressing changes as previously instructed on the Right foot. Patient denies any pain in her feet at this time. Patient states her blood sugars this AM were 140 mg/dL. Patient denies any fever, chills, nausea, or vomiting. Patient denies any other pedal complaints at this time.        Past Medical History:        Diagnosis Date    Amenorrhea     Anomalies of nails     Asthma 5/14/04    Athlete's foot 8/2010    Bacterial vaginosis 04/2008    Carpal tunnel syndrome 5/2007    [facility]     Diabetes mellitus type II 08/2007    Diabetic neuropathy ([facility] 8/10    DVT (deep venous thrombosis) ([facility] 3/2004    Dyslipidemia 5/2009    Dyspareunia 05/2009    ETOH abuse 3/04/2007    Feet clawing     HTN (hypertension)     Hx of blood clots     Hyperlipidemia     MRSA (methicillin resistant staph aureus) culture positive 11/06/2017; 11/17/2017    foot; leg     Neuropathy 05/2009    polyneuropathy    Pain, back 04/2008    Pain, eye, right 5/14/04    Pancreatitis 5/14/04    Pseudocyst, pancreas 5/14/04    Scalp lesion 08/2007    Tinea pedis     Tobacco abuse 03/2008    Vaginal bleeding, abnormal 6/2007     Lab Results   Component Value Date    LABA1C 12.2 06/02/2019     Lab Results   Component Value Date    .4 06/02/2019     REVIEW OF SYSTEMS:  CONSTITUTIONAL:  negative for  fevers and chills  RESPIRATORY:  negative for  dyspnea  CARDIOVASCULAR:  negative for  chest pain  GASTROINTESTINAL:  negative for nausea, vomiting and abdominal pain  INTEGUMENT/BREAST:  positive for skin color change and open wounds to bilateral instructed she is weightbearing as tolerated in surgical shoe b/l to the Right lower extremity at all times.       Alli Ivory, PGY-1  Pager: [phone]

## 2019-07-22 ENCOUNTER — TELEPHONE (OUTPATIENT)
Dept: INTERNAL MEDICINE CLINIC | Age: 54
End: 2019-07-22

## 2019-07-22 NOTE — TELEPHONE ENCOUNTER
Left message with instructions for dressing changes  on Boris's voice mail and instructed her to call me back if any further questions. Right foot betadine, gauze , kerlix and ace. Left leg ace wrap to control edema. Return Friday to clinic.

## 2019-07-23 ENCOUNTER — TELEPHONE (OUTPATIENT)
Dept: INTERNAL MEDICINE CLINIC | Age: 54
End: 2019-07-23

## 2019-07-23 LAB
AFB CULTURE (MYCOBACTERIA): NORMAL
AFB SMEAR: NORMAL

## 2019-07-24 ENCOUNTER — TELEPHONE (OUTPATIENT)
Dept: INTERNAL MEDICINE CLINIC | Age: 54
End: 2019-07-24

## 2019-07-24 NOTE — TELEPHONE ENCOUNTER
Wants to let us know that they were unsuccessful to get her labs. Due to poor light, messy house, and multiple dogs and cats.

## 2019-07-26 ENCOUNTER — OFFICE VISIT (OUTPATIENT)
Dept: INTERNAL MEDICINE CLINIC | Age: 54
End: 2019-07-26
Payer: COMMERCIAL

## 2019-07-26 DIAGNOSIS — Z98.890 S/P FOOT SURGERY, RIGHT: Primary | ICD-10-CM

## 2019-07-26 PROCEDURE — 99213 OFFICE O/P EST LOW 20 MIN: CPT | Performed by: PODIATRIST

## 2019-07-26 NOTE — PROGRESS NOTES
Department of Podiatry  Resident Progress Note    Crist Gaucher  Allergies: Sulfa antibiotics    SUBJECTIVE  The patient is a 62 y.o. female who presents to clinic today for f/u wound check to both feet. Patient states that she has been doing better since last being seen. Patient states that Shay 76 performing every other day dressing changes with tiffany Left foot ans saline and gauze on the right. Patient denies pain in her feet at this time. Patient states her blood sugars this AM were 150mg/dL. Patient denies any N/V/F/C or SOB. Patient denies any other pedal complaints. .       Lab Results   Component Value Date    LABA1C 12.2 06/02/2019     Lab Results   Component Value Date    .4 06/02/2019         Past Medical History:        Diagnosis Date    Amenorrhea     Anomalies of nails     Asthma 5/14/04    Athlete's foot 8/2010    Bacterial vaginosis 04/2008    Carpal tunnel syndrome 5/2007    [facility]) [facility]     Diabetes mellitus type II 08/2007    Diabetic neuropathy ([facility] 8/10    DVT (deep venous thrombosis) ([facility] 3/2004    Dyslipidemia 5/2009    Dyspareunia 05/2009    ETOH abuse 3/04/2007    Feet clawing     HTN (hypertension)     Hx of blood clots     Hyperlipidemia     MRSA (methicillin resistant staph aureus) culture positive 11/06/2017; 11/17/2017    foot; leg     Neuropathy 05/2009    polyneuropathy    Pain, back 04/2008    Pain, eye, right 5/14/04    Pancreatitis 5/14/04    Pseudocyst, pancreas 5/14/04    Scalp lesion 08/2007    Tinea pedis     Tobacco abuse 03/2008    Vaginal bleeding, abnormal 6/2007     Lab Results   Component Value Date    LABA1C 12.2 06/02/2019     Lab Results   Component Value Date    .4 06/02/2019     REVIEW OF SYSTEMS:  CONSTITUTIONAL:  negative for  fevers and chills  RESPIRATORY:  negative for  dyspnea  CARDIOVASCULAR:  negative for  chest pain  GASTROINTESTINAL:  negative for nausea, vomiting and abdominal

## 2019-07-26 NOTE — PATIENT INSTRUCTIONS
Return in 1 week. Use betadine left foot wound. Daily if it is draining . If it is dry use tiffany. Right foot betadine to wound daily .    Heel weight bear with post op shoe

## 2019-08-02 ENCOUNTER — OFFICE VISIT (OUTPATIENT)
Dept: INTERNAL MEDICINE CLINIC | Age: 54
End: 2019-08-02
Payer: COMMERCIAL

## 2019-08-02 DIAGNOSIS — Z98.890 S/P FOOT SURGERY, RIGHT: Primary | ICD-10-CM

## 2019-08-02 DIAGNOSIS — S91.302D OPEN WOUND OF LEFT FOOT, SUBSEQUENT ENCOUNTER: ICD-10-CM

## 2019-08-02 PROCEDURE — 99213 OFFICE O/P EST LOW 20 MIN: CPT | Performed by: PODIATRIST

## 2019-08-05 NOTE — PROGRESS NOTES
OUTPATIENT SPECIALTY PODIATRY VISIT      Nursing Progress Note      August 2, 2019  Shirley Parsons    Patient description of the problem: wound check    Observations: dressing intact    Ambulates:  assistance    Gait:      Assistive Devices: wheelchair    Fall History: No    Foot Hygiene: good    Foot Wear Proper: Yes    Impaired Skin Integrity: Yes      Pain Assessment:  Pain Present: no  Pain Score: 0/10  Pain Quality/Description:   Pain Onset:   ago  Pain Goal of patient: /10    Education Assessment:    Identify the learner who is being assessed for education:                      Ability to Learn:  Exhibits ability to grasp concepts and respond to questions: Medium  Ready to Learn: No  calm   Preferred Method of Learning:  written  Barriers to Learning: Verbalizes interest  Special Considerations due to cultural, Yazidism, spiritual beliefs:  No  Language:  English  :  No    Xena Miller Page  4:13 PM 8/2/2019
pain  INTEGUMENT/BREAST:  positive for skin color change and open wounds to bilateral feet  MUSCULOSKELETAL:  negative for  myalgias, arthralgias, pain and joint swelling    OBJECTIVE    Patient presents to the clinic assisted and ambulating with a wheelchair and unaccompanied wearing ACE bandage and post op shoes b/l. Patient is alert, aware, and oriented without any signs of acute distress. VASCULAR: DP and PT pulses are palpable 1/4 b/l. CFT is brisk to the remaining digits of the foot, b/l. Skin temperature is warm to warm from proximal to distal with no focal calor noted, Right lower extremity. Diffuse edema noted b/l, Right lower extremity. No pain with calf compression, Right lower extremity.      NEUROLOGIC: Gross and epicritic sensation is diminished b/l. Protective sensation is diminished at all pedal sites b/l.     DERMATOLOGIC:   Left sub 1st Metatarsal head partial thickness ulceration. After debridement wound measured 2.0cm x 1.0cm x 0.3cm. Pink granular base with macerated and hyperkeratotic borders. No probe to bone, no drainage, no malodor, no tunneling or undermining. Right Left TMA with sutures still intact and skin well approximated aside from a small area on the lateral aspect of the TMA stump. No probe to bone, no drainage, no malodor, no tunneling or undermining. Periwound diffusely xerotic. MUSCULOSKELETAL: Muscle strength is 5/5 for all pedal groups tested. Pain with palpation to the plantar aspect of the Right foot at the level of the wound. 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 ASSESSMENT  1. 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 S/P TMA - Right foot  3. DM Type II uncontrolled with peripheral neuropathy  4. History of Non-compliance  5.  Personal History of Nicotine Dependence    PLAN  - Evaluation and management x 25 minutes and greater than 50% of the time spent explaining

## 2019-08-09 ENCOUNTER — OFFICE VISIT (OUTPATIENT)
Dept: INTERNAL MEDICINE CLINIC | Age: 54
End: 2019-08-09
Payer: COMMERCIAL

## 2019-08-09 DIAGNOSIS — L97.522 DIABETIC ULCER OF LEFT FOOT ASSOCIATED WITH TYPE 2 DIABETES MELLITUS, WITH FAT LAYER EXPOSED, UNSPECIFIED PART OF FOOT (HCC): Primary | ICD-10-CM

## 2019-08-09 DIAGNOSIS — E11.621 DIABETIC ULCER OF LEFT FOOT ASSOCIATED WITH TYPE 2 DIABETES MELLITUS, WITH FAT LAYER EXPOSED, UNSPECIFIED PART OF FOOT (HCC): Primary | ICD-10-CM

## 2019-08-09 PROCEDURE — 99213 OFFICE O/P EST LOW 20 MIN: CPT | Performed by: PODIATRIST

## 2019-08-09 NOTE — PATIENT INSTRUCTIONS
Right foot. May wear surgi- shoe, Cleanse with betadine and band aid daily. Left foot wear cam boot. Modesta , 4x4, Kerlix and ace daily.     Heel weight bearing on left only  Return 1 week

## 2019-08-09 NOTE — PROGRESS NOTES
Department of Podiatry  Resident Progress Note    Michel Markham  Allergies: Sulfa antibiotics    SUBJECTIVE  The patient is a 62 y.o. female who presents to clinic today for f/u wound check to both feet. Right foot S/P TMA. Patient states that she has been doing better since last being since last visit. Patient did admit to walking on her feet yesterday. Patient is heel weight bearing to transfer, but is non compliant. Patient states that Shay 76 performing every day dressing changes with tiffany, saline and gauze on the left and betadine, gauze, and ace on the right. Patient denies pain at this visit. Patient states her blood sugars this AM were 178mg/dL. Patient admits to have high blood sugar and is trying to lower it. Patient denies any N/V/F/C or SOB. Patient denies any other pedal complaints at this visit.        Lab Results   Component Value Date    LABA1C 12.2 06/02/2019     Lab Results   Component Value Date    .4 06/02/2019           Past Medical History:        Diagnosis Date    Amenorrhea     Anomalies of nails     Asthma 5/14/04    Athlete's foot 8/2010    Bacterial vaginosis 04/2008    Carpal tunnel syndrome 5/2007    [facility]     Diabetes mellitus type II 08/2007    Diabetic neuropathy ([facility] 8/10    DVT (deep venous thrombosis) ([facility] 3/2004    Dyslipidemia 5/2009    Dyspareunia 05/2009    ETOH abuse 3/04/2007    Feet clawing     HTN (hypertension)     Hx of blood clots     Hyperlipidemia     MRSA (methicillin resistant staph aureus) culture positive 11/06/2017; 11/17/2017    foot; leg     Neuropathy 05/2009    polyneuropathy    Pain, back 04/2008    Pain, eye, right 5/14/04    Pancreatitis 5/14/04    Pseudocyst, pancreas 5/14/04    Scalp lesion 08/2007    Tinea pedis     Tobacco abuse 03/2008    Vaginal bleeding, abnormal 6/2007     Lab Results   Component Value Date    LABA1C 12.2 06/02/2019     Lab Results   Component Value Date

## 2019-08-22 ENCOUNTER — OFFICE VISIT (OUTPATIENT)
Dept: INTERNAL MEDICINE CLINIC | Age: 54
End: 2019-08-22
Payer: COMMERCIAL

## 2019-08-22 VITALS
SYSTOLIC BLOOD PRESSURE: 157 MMHG | BODY MASS INDEX: 37.46 KG/M2 | OXYGEN SATURATION: 95 % | RESPIRATION RATE: 16 BRPM | HEART RATE: 88 BPM | DIASTOLIC BLOOD PRESSURE: 78 MMHG | TEMPERATURE: 98.9 F | WEIGHT: 204.8 LBS

## 2019-08-22 DIAGNOSIS — I50.9 CONGESTIVE HEART FAILURE, UNSPECIFIED HF CHRONICITY, UNSPECIFIED HEART FAILURE TYPE (HCC): ICD-10-CM

## 2019-08-22 DIAGNOSIS — I10 ESSENTIAL HYPERTENSION: ICD-10-CM

## 2019-08-22 DIAGNOSIS — E11.42 DIABETIC POLYNEUROPATHY ASSOCIATED WITH TYPE 2 DIABETES MELLITUS (HCC): ICD-10-CM

## 2019-08-22 DIAGNOSIS — M54.50 CHRONIC BILATERAL LOW BACK PAIN WITHOUT SCIATICA: ICD-10-CM

## 2019-08-22 DIAGNOSIS — G89.29 CHRONIC BILATERAL LOW BACK PAIN WITHOUT SCIATICA: ICD-10-CM

## 2019-08-22 DIAGNOSIS — E11.42 TYPE 2 DIABETES MELLITUS WITH DIABETIC POLYNEUROPATHY, WITH LONG-TERM CURRENT USE OF INSULIN (HCC): ICD-10-CM

## 2019-08-22 DIAGNOSIS — E78.5 DYSLIPIDEMIA: ICD-10-CM

## 2019-08-22 DIAGNOSIS — Z12.11 ENCOUNTER FOR SCREENING COLONOSCOPY: Primary | ICD-10-CM

## 2019-08-22 DIAGNOSIS — Z79.4 TYPE 2 DIABETES MELLITUS WITH DIABETIC POLYNEUROPATHY, WITH LONG-TERM CURRENT USE OF INSULIN (HCC): ICD-10-CM

## 2019-08-22 PROCEDURE — 99213 OFFICE O/P EST LOW 20 MIN: CPT | Performed by: STUDENT IN AN ORGANIZED HEALTH CARE EDUCATION/TRAINING PROGRAM

## 2019-08-22 RX ORDER — AMITRIPTYLINE HYDROCHLORIDE 100 MG/1
100 TABLET, FILM COATED ORAL NIGHTLY
Qty: 30 TABLET | Refills: 1 | Status: SHIPPED | OUTPATIENT
Start: 2019-08-22 | End: 2019-10-03 | Stop reason: SDUPTHER

## 2019-08-22 RX ORDER — GABAPENTIN 400 MG/1
400 CAPSULE ORAL 3 TIMES DAILY
Qty: 90 CAPSULE | Refills: 0 | Status: SHIPPED | OUTPATIENT
Start: 2019-08-22 | End: 2019-09-27 | Stop reason: SDUPTHER

## 2019-08-22 RX ORDER — ATORVASTATIN CALCIUM 20 MG/1
20 TABLET, FILM COATED ORAL NIGHTLY
Qty: 30 TABLET | Refills: 1 | Status: SHIPPED | OUTPATIENT
Start: 2019-08-22 | End: 2020-04-14 | Stop reason: SDUPTHER

## 2019-08-22 RX ORDER — METFORMIN HYDROCHLORIDE 500 MG/1
500 TABLET, EXTENDED RELEASE ORAL 2 TIMES DAILY
Qty: 90 TABLET | Refills: 1 | Status: SHIPPED | OUTPATIENT
Start: 2019-08-22 | End: 2019-10-28 | Stop reason: SDUPTHER

## 2019-08-22 RX ORDER — METOPROLOL SUCCINATE 50 MG/1
50 TABLET, EXTENDED RELEASE ORAL NIGHTLY
Qty: 30 TABLET | Refills: 1 | Status: SHIPPED | OUTPATIENT
Start: 2019-08-22 | End: 2020-03-10 | Stop reason: SDUPTHER

## 2019-08-22 RX ORDER — ALBUTEROL SULFATE 90 UG/1
2 AEROSOL, METERED RESPIRATORY (INHALATION) EVERY 6 HOURS PRN
Qty: 1 INHALER | Refills: 5 | Status: SHIPPED | OUTPATIENT
Start: 2019-08-22 | End: 2020-04-14 | Stop reason: SDUPTHER

## 2019-08-22 ASSESSMENT — ENCOUNTER SYMPTOMS
SHORTNESS OF BREATH: 0
BOWEL INCONTINENCE: 0
VISUAL CHANGE: 0
BLURRED VISION: 0
BACK PAIN: 1
ABDOMINAL PAIN: 0

## 2019-08-22 NOTE — ASSESSMENT & PLAN NOTE
2/2 degenerative disc disease, no neurological complaints including lower extremity weakness, bladder/bowel incontinence, weight loss, fevers, chills.  -Will prescribe voltaren gel and lidocaine patch

## 2019-08-23 ENCOUNTER — OFFICE VISIT (OUTPATIENT)
Dept: INTERNAL MEDICINE CLINIC | Age: 54
End: 2019-08-23
Payer: COMMERCIAL

## 2019-08-23 DIAGNOSIS — Z98.890 S/P FOOT SURGERY, RIGHT: ICD-10-CM

## 2019-08-23 DIAGNOSIS — S91.302D OPEN WOUND OF LEFT FOOT, SUBSEQUENT ENCOUNTER: ICD-10-CM

## 2019-08-23 PROCEDURE — 99213 OFFICE O/P EST LOW 20 MIN: CPT | Performed by: PODIATRIST

## 2019-08-23 NOTE — PROGRESS NOTES
OUTPATIENT SPECIALTY PODIATRY VISIT      Nursing Progress Note      August 23, 2019  Jluisjoelle Nancy    Patient description of the problem: new ulcer developing right posterior  foot incision line looks good from TMA  Left great toe ulcer macerated serous drainage noted.      Observations: as above    Ambulates:  assistance    Gait:      Assistive Devices: wheelchair    Fall History: No    Foot Hygiene: good    Foot Wear Proper: Yes    Impaired Skin Integrity: Yes      Pain Assessment:  Pain Present: no  Pain Score: 0/10  Pain Quality/Description:   Pain Onset:   ago  Pain Goal of patient: /10    Education Assessment:    Identify the learner who is being assessed for education:  patient                    Ability to Learn:  Exhibits ability to grasp concepts and respond to questions: Medium  Ready to Learn: No  calm   Preferred Method of Learning:  written  Barriers to Learning: Verbalizes interest  Special Considerations due to cultural, Anabaptist, spiritual beliefs:  No  Language:  English  :  No    Comments:  Not in her boot as ordered,    Ivanna Hartley Page  1:49 PM 8/23/2019

## 2019-08-23 NOTE — PROGRESS NOTES
Department of Podiatry  Resident Progress Note    Jabier Hunt  Allergies: Sulfa antibiotics    SUBJECTIVE  The patient is a 62 y.o. female who presents to clinic today for f/u wound check to her left feet and  Right foot [facility] 6/06/2019). Patient states that she has been doing better since last visit, but admits she walks on her feet more than she likes. Patient is non compliant even when told to be non weight bearing to the left foot. Patient denies any pain at this visit. Patient is a diabetic. Patient states her blood sugars this AM were 154mg/dL. Patient denies any N/V/F/C or SOB. Patient denies any other pedal complaints at this visit.        Lab Results   Component Value Date    LABA1C 12.2 06/02/2019     Lab Results   Component Value Date    .4 06/02/2019           Past Medical History:        Diagnosis Date    Amenorrhea     Anomalies of nails     Asthma 5/14/04    Athlete's foot 8/2010    Bacterial vaginosis 04/2008    Carpal tunnel syndrome 5/2007    [facility] [address] [address]     Diabetes mellitus type II 08/2007    Diabetic neuropathy ([facility] [address] 8/10    DVT (deep venous thrombosis) ([facility] [address] 3/2004    Dyslipidemia 5/2009    Dyspareunia 05/2009    ETOH abuse 3/04/2007    Feet clawing     HTN (hypertension)     Hx of blood clots     Hyperlipidemia     MRSA (methicillin resistant staph aureus) culture positive 11/06/2017; 11/17/2017    foot; leg     Neuropathy 05/2009    polyneuropathy    Pain, back 04/2008    Pain, eye, right 5/14/04    Pancreatitis 5/14/04    Pseudocyst, pancreas 5/14/04    Scalp lesion 08/2007    Tinea pedis     Tobacco abuse 03/2008    Vaginal bleeding, abnormal 6/2007     Lab Results   Component Value Date    LABA1C 12.2 06/02/2019     Lab Results   Component Value Date    .4 06/02/2019     REVIEW OF SYSTEMS:  CONSTITUTIONAL:  negative for  fevers and chills  RESPIRATORY:  negative for  dyspnea  CARDIOVASCULAR: negative for  chest pain  GASTROINTESTINAL:  negative for nausea, vomiting and abdominal pain  INTEGUMENT/BREAST:  positive for skin color change and open wounds to bilateral feet  MUSCULOSKELETAL:  negative for  myalgias, arthralgias, pain and joint swelling    OBJECTIVE    Patient presents to the clinic unaccompanied, assisted,  and ambulating with the aid of her wheelchair wearing ACE bandage's and dirty post-op shoes b/l. Patient is A&Ox3 and NAD. VASCULAR: DP and PT pulses are faintly palpable b/l. CFT is brisk to the remaining digits of the left foot. Skin temperature is warm to warm from proximal to distal with no focal calor noted, Right lower extremity. Diffuse non-pitting edema noted b/l.     NEUROLOGIC: Gross and epicritic sensation is diminished b/l. Protective sensation is diminished at all pedal sites b/l.     DERMATOLOGIC:  Hyperpigment discoloration noted b/l lower extremity. Left sub 1st Metatarsal head partial thickness ulcer with macerated border. Periwound macerated tissue. After debridement wound measured 2.0 cm x 1.0cm x 0.2cm. Pink granular base. No probe to bone, no drainage, no malodor, no tunneling or undermining. No signs of infection. Right foot intact bulla. No open wound, no drainage, no malodor. No sign of infections. Right Left TMA with sutures intact and skin well approximated. No open wounds. Periwound diffusely xerotic and flaky tissue. Patient gave verbal consent for the picture taken at today's visit. MUSCULOSKELETAL: Muscle strength is 5/5 for all pedal groups tested. Previous Left Hallux amputation and TMA right foot. No pain with palpation to the right foot at the level of the TMA site.  No pain with calf compression b/l.     ASSESSMENT  Partial thickness ulcer 2/2 DM, sub 1st metatarsal head - Left 2/2 Neuropathy  Bullous - right foot 2/2 pressure  S/P TMA - Right foot - healed  DM Type II uncontrolled with peripheral neuropathy  History of

## 2019-08-27 ENCOUNTER — TELEPHONE (OUTPATIENT)
Dept: INTERNAL MEDICINE CLINIC | Age: 54
End: 2019-08-27

## 2019-09-06 ENCOUNTER — OFFICE VISIT (OUTPATIENT)
Dept: INTERNAL MEDICINE CLINIC | Age: 54
End: 2019-09-06
Payer: COMMERCIAL

## 2019-09-06 DIAGNOSIS — E11.628 DIABETIC FOOT INFECTION (HCC): Primary | ICD-10-CM

## 2019-09-06 DIAGNOSIS — L08.9 DIABETIC FOOT INFECTION (HCC): Primary | ICD-10-CM

## 2019-09-06 PROCEDURE — 87205 SMEAR GRAM STAIN: CPT

## 2019-09-06 PROCEDURE — 87070 CULTURE OTHR SPECIMN AEROBIC: CPT

## 2019-09-06 PROCEDURE — 99213 OFFICE O/P EST LOW 20 MIN: CPT | Performed by: STUDENT IN AN ORGANIZED HEALTH CARE EDUCATION/TRAINING PROGRAM

## 2019-09-06 RX ORDER — CIPROFLOXACIN 500 MG/1
750 TABLET, FILM COATED ORAL 2 TIMES DAILY
Qty: 30 TABLET | Refills: 0 | Status: SHIPPED | OUTPATIENT
Start: 2019-09-06 | End: 2019-09-16

## 2019-09-06 NOTE — PROGRESS NOTES
Department of Podiatry  Resident Progress Note    Doc Dust  Allergies: Sulfa antibiotics    SUBJECTIVE  The patient is a 62 y.o. female who presents to clinic for f/u on left wound. Patient states that she has been doing better since last visit, but admits she walks on her feet more than she likes. Patient is non compliant even when told to be non weight bearing to the left foot. Patient denies any pain at this visit. Patient is a diabetic. Patient states her blood sugars this AM were 154mg/dL. Patient denies any N/V/F/C or SOB. Patient denies any other pedal complaints at this visit.        Lab Results   Component Value Date    LABA1C 12.2 06/02/2019     Lab Results   Component Value Date    .4 06/02/2019           Past Medical History:        Diagnosis Date    Amenorrhea     Anomalies of nails     Asthma 5/14/04    Athlete's foot 8/2010    Bacterial vaginosis 04/2008    Carpal tunnel syndrome 5/2007    COPD [facility]) [facility] [address]     Diabetes mellitus type II 08/2007    Diabetic neuropathy ([facility] [address] 8/10    DVT (deep venous thrombosis) ([facility] [address] 3/2004    Dyslipidemia 5/2009    Dyspareunia 05/2009    ETOH abuse 3/04/2007    Feet clawing     HTN (hypertension)     Hx of blood clots     Hyperlipidemia     MRSA (methicillin resistant staph aureus) culture positive 11/06/2017; 11/17/2017    foot; leg     Neuropathy 05/2009    polyneuropathy    Pain, back 04/2008    Pain, eye, right 5/14/04    Pancreatitis 5/14/04    Pseudocyst, pancreas 5/14/04    Scalp lesion 08/2007    Tinea pedis     Tobacco abuse 03/2008    Vaginal bleeding, abnormal 6/2007     Lab Results   Component Value Date    LABA1C 12.2 06/02/2019     Lab Results   Component Value Date    .4 06/02/2019     REVIEW OF SYSTEMS:  CONSTITUTIONAL:  negative for  fevers and chills  RESPIRATORY:  negative for  dyspnea  CARDIOVASCULAR:  negative for  chest pain  GASTROINTESTINAL:  negative for nausea, vomiting and abdominal pain  INTEGUMENT/BREAST:  positive for skin color change and open wounds to bilateral feet  MUSCULOSKELETAL:  negative for  myalgias, arthralgias, pain and joint swelling    OBJECTIVE    Patient presents to the clinic unaccompanied, assisted,  and ambulating with the aid of her wheelchair wearing ACE bandage's and dirty post-op shoes b/l. Patient is A&Ox3 and NAD. VASCULAR: DP and PT pulses are palpable b/l. CFT is brisk to the remaining digits of the left foot. Skin temperature is warm to warm from proximal to distal with no focal calor noted, Right lower extremity. Diffuse non-pitting edema noted b/l.     NEUROLOGIC: Gross and epicritic sensation is diminished b/l. Protective sensation is diminished at all pedal sites b/l.     DERMATOLOGIC:  Hyperpigment discoloration noted b/l lower extremity. Left sub 1st Metatarsal head partial thickness ulcer with macerated border. Periwound macerated tissue. After debridement wound measured 2.0 cm x 1.0cm x 0.2cm. Pink granular base. No probe to bone, no drainage, no malodor, no tunneling or undermining. No signs of infection. Right:   Partial thickness wound with granular base measuring 2.5 x 3.3 cm with previous bulla and serous drainage. Full thickness wound with 75% granular 25% fibrotic base with hyperkeratotic, irregular borders showing signs of maceration. The wound is located at left plantar 1st metatarsal head. The wound exhibited tunneling at the distal-medial aspect with purulent drainage noted. Minimal erythema, non-malodorous, does not probe to bone. Mild fluctuance noted proximal to the wound. No other lacerations or open wounds noted. MUSCULOSKELETAL: Muscle strength is 5/5 for all pedal groups tested. TMA to right. ASSESSMENT  Diabetic foot infection,Carpenter grade 2, left foot. Partial thickness wound 2/2 bulla, right foot.    Full thickness ulcer 2/2 DM, sub 1st metatarsal head - Left 2/2

## 2019-09-08 LAB
GRAM STAIN RESULT: ABNORMAL
WOUND/ABSCESS: ABNORMAL

## 2019-09-09 ENCOUNTER — OFFICE VISIT (OUTPATIENT)
Dept: INTERNAL MEDICINE CLINIC | Age: 54
End: 2019-09-09
Payer: COMMERCIAL

## 2019-09-09 DIAGNOSIS — L08.9 DIABETIC FOOT INFECTION (HCC): ICD-10-CM

## 2019-09-09 DIAGNOSIS — E11.621 DIABETIC ULCER OF LEFT FOOT ASSOCIATED WITH TYPE 2 DIABETES MELLITUS, WITH FAT LAYER EXPOSED, UNSPECIFIED PART OF FOOT (HCC): Primary | ICD-10-CM

## 2019-09-09 DIAGNOSIS — I50.9 CONGESTIVE HEART FAILURE, UNSPECIFIED HF CHRONICITY, UNSPECIFIED HEART FAILURE TYPE (HCC): ICD-10-CM

## 2019-09-09 DIAGNOSIS — L97.522 DIABETIC ULCER OF LEFT FOOT ASSOCIATED WITH TYPE 2 DIABETES MELLITUS, WITH FAT LAYER EXPOSED, UNSPECIFIED PART OF FOOT (HCC): Primary | ICD-10-CM

## 2019-09-09 DIAGNOSIS — E11.628 DIABETIC FOOT INFECTION (HCC): ICD-10-CM

## 2019-09-09 PROBLEM — L97.412 DIABETIC ULCER OF RIGHT MIDFOOT ASSOCIATED WITH TYPE 2 DIABETES MELLITUS, WITH FAT LAYER EXPOSED (HCC): Status: ACTIVE | Noted: 2019-05-31

## 2019-09-09 LAB
BASOPHILS ABSOLUTE: 0.1 K/UL (ref 0–0.2)
BASOPHILS RELATIVE PERCENT: 0.5 %
EOSINOPHILS ABSOLUTE: 0.2 K/UL (ref 0–0.6)
EOSINOPHILS RELATIVE PERCENT: 1.7 %
HCT VFR BLD CALC: 30.1 % (ref 36–48)
HEMOGLOBIN: 9.8 G/DL (ref 12–16)
LYMPHOCYTES ABSOLUTE: 2.2 K/UL (ref 1–5.1)
LYMPHOCYTES RELATIVE PERCENT: 22.5 %
MCH RBC QN AUTO: 27.3 PG (ref 26–34)
MCHC RBC AUTO-ENTMCNC: 32.7 G/DL (ref 31–36)
MCV RBC AUTO: 83.5 FL (ref 80–100)
MONOCYTES ABSOLUTE: 0.5 K/UL (ref 0–1.3)
MONOCYTES RELATIVE PERCENT: 5 %
NEUTROPHILS ABSOLUTE: 6.8 K/UL (ref 1.7–7.7)
NEUTROPHILS RELATIVE PERCENT: 70.3 %
PDW BLD-RTO: 15.4 % (ref 12.4–15.4)
PLATELET # BLD: 343 K/UL (ref 135–450)
PMV BLD AUTO: 8.6 FL (ref 5–10.5)
RBC # BLD: 3.6 M/UL (ref 4–5.2)
WBC # BLD: 9.7 K/UL (ref 4–11)

## 2019-09-09 PROCEDURE — 99213 OFFICE O/P EST LOW 20 MIN: CPT | Performed by: PODIATRIST

## 2019-09-09 NOTE — PROGRESS NOTES
Department of Podiatry  Resident Progress Note    Juan Carlos Brooks  Allergies: Sulfa antibiotics    SUBJECTIVE  The patient is a 62 y.o. female who presents to clinic for f/u of bilateral foot wounds. Patient states that she has been doing okay since last visit, and is feeling much better. She states she has tried to stay off of her feet, but states she has walked even though she knows she is supposed to be non-weightbearing. Patient denies any pain at this visit. Patient is a diabetic, but does not know what her blood sugars were this AM. Patient denies nausea, vomiting, fever, chills, shortness of breath, or calf pain. Patient denies any other pedal complaints at this visit. Lab Results   Component Value Date    LABA1C 12.2 06/02/2019     Lab Results   Component Value Date    .4 06/02/2019       ROS: A 10 point review of systems was conducted, significant findings as noted in HPI. All other systems negative.     Past Medical History:        Diagnosis Date    Amenorrhea     Anomalies of nails     Asthma 5/14/04    Athlete's foot 8/2010    Bacterial vaginosis 04/2008    Carpal tunnel syndrome 5/2007    [facility]) [facility] [address]     Diabetes mellitus type II 08/2007    Diabetic neuropathy ([facility] [address] 8/10    DVT (deep venous thrombosis) ([facility] [address] 3/2004    Dyslipidemia 5/2009    Dyspareunia 05/2009    ETOH abuse 3/04/2007    Feet clawing     HTN (hypertension)     Hx of blood clots     Hyperlipidemia     MRSA (methicillin resistant staph aureus) culture positive 11/06/2017; 11/17/2017    foot; leg     Neuropathy 05/2009    polyneuropathy    Pain, back 04/2008    Pain, eye, right 5/14/04    Pancreatitis 5/14/04    Pseudocyst, pancreas 5/14/04    Scalp lesion 08/2007    Tinea pedis     Tobacco abuse 03/2008    Vaginal bleeding, abnormal 6/2007       OBJECTIVE  Patient presents to the clinic unaccompanied, assisted with the aid of her wheelchair wearing ACE bandage's

## 2019-09-13 ENCOUNTER — OFFICE VISIT (OUTPATIENT)
Dept: INTERNAL MEDICINE CLINIC | Age: 54
End: 2019-09-13
Payer: COMMERCIAL

## 2019-09-13 DIAGNOSIS — E11.621 DIABETIC ULCER OF RIGHT MIDFOOT ASSOCIATED WITH TYPE 2 DIABETES MELLITUS, WITH FAT LAYER EXPOSED (HCC): Primary | ICD-10-CM

## 2019-09-13 DIAGNOSIS — E11.621 DIABETIC ULCER OF LEFT FOOT ASSOCIATED WITH TYPE 2 DIABETES MELLITUS, WITH FAT LAYER EXPOSED, UNSPECIFIED PART OF FOOT (HCC): ICD-10-CM

## 2019-09-13 DIAGNOSIS — L97.412 DIABETIC ULCER OF RIGHT MIDFOOT ASSOCIATED WITH TYPE 2 DIABETES MELLITUS, WITH FAT LAYER EXPOSED (HCC): Primary | ICD-10-CM

## 2019-09-13 DIAGNOSIS — L97.522 DIABETIC ULCER OF LEFT FOOT ASSOCIATED WITH TYPE 2 DIABETES MELLITUS, WITH FAT LAYER EXPOSED, UNSPECIFIED PART OF FOOT (HCC): ICD-10-CM

## 2019-09-13 PROCEDURE — 99213 OFFICE O/P EST LOW 20 MIN: CPT | Performed by: PODIATRIST

## 2019-09-15 NOTE — PROGRESS NOTES
Department of Podiatry  Resident Progress Note    Ann Reaves  Allergies: Sulfa antibiotics    SUBJECTIVE  The patient is a 62 y.o. female who presents to clinic for f/u of bilateral foot wounds. Patient states that she has been doing okay since last visit, and is feeling much better. She states she has tried to stay off of her feet, but states she has walked even though she knows she is supposed to be non-weightbearing. Patient denies any pain at this visit. Patient is a diabetic, but did not check her blood sugar today. Patient denies nausea, vomiting, fever, chills, shortness of breath, or calf pain. Patient denies any other pedal complaints at this visit. Lab Results   Component Value Date    LABA1C 12.2 06/02/2019     Lab Results   Component Value Date    .4 06/02/2019       ROS: A 10 point review of systems was conducted, significant findings as noted in HPI. All other systems negative.     Past Medical History:        Diagnosis Date    Amenorrhea     Anomalies of nails     Asthma 5/14/04    Athlete's foot 8/2010    Bacterial vaginosis 04/2008    Carpal tunnel syndrome 5/2007    [facility] (ch[facility]) ([facility] [address]     Diabetes mellitus type II 08/2007    Diabetic neuropathy ([facility] [address] 8/10    DVT (deep venous thrombosis) ([facility] [address] 3/2004    Dyslipidemia 5/2009    Dyspareunia 05/2009    ETOH abuse 3/04/2007    Feet clawing     HTN (hypertension)     Hx of blood clots     Hyperlipidemia     MRSA (methicillin resistant staph aureus) culture positive 11/06/2017; 11/17/2017    foot; leg     Neuropathy 05/2009    polyneuropathy    Pain, back 04/2008    Pain, eye, right 5/14/04    Pancreatitis 5/14/04    Pseudocyst, pancreas 5/14/04    Scalp lesion 08/2007    Tinea pedis     Tobacco abuse 03/2008    Vaginal bleeding, abnormal 6/2007       OBJECTIVE  Patient presents to the clinic stating her dad drove her to her appointment, assisted with the aid of her wheelchair wearing ACE bandage's and dirty post-op shoes b/l. Patient is alert, awake, and oriented without any signs of acute distress. VASCULAR: DP and PT pulses are palpable b/l. CFT is brisk to the remaining digits of the left foot. Skin temperature is warm to warm from proximal to distal with no focal calor noted, Right lower extremity. Diffuse non-pitting edema noted b/l.     NEUROLOGIC: Gross and epicritic sensation is diminished b/l. Protective sensation is diminished at all pedal sites b/l.     DERMATOLOGIC:  Hyperpigment discoloration noted b/l lower extremity. Left sub 1st metatarsal head full thickness ulcer with macerated periwound border. Periwound macerated tissue. After debridement wound measured 1.2cm x 2.0cm x 0.3cm. Pink granular base. No probe to bone, no drainage, no malodor, no tunneling or undermining. No signs of infection. Right plantar-lateral foot partial thickness wound with granular base measuring 3.0cm x 4.1cm x 0.1cm with macerated periwound. Minimal serous drainage. No malodor, fluctuance, tunneling, undermining, or signs of infection. MUSCULOSKELETAL: Muscle strength is 5/5 for all pedal groups tested. TMA noted to right.        ASSESSMENT  - Full thickness ulceration, Carpenter 2, 2/2 DM, plantar-lateral right foot  - Full thickness ulceration, Carpenter 2, 2/2 DM, sub 1st metatarsal head left foot  - DM Type II uncontrolled with peripheral neuropathy  - History of Non-compliance  - Personal History of Nicotine Dependence    PLAN  - Evaluation and treatment x15 minutes with >50% of the time discussing with patient the etiology and treatment of their condition  - Excisional debridement using a #15 blade down to and including subcutaneous tissue, left foot ulceration  - Excisional debridement using a #15 blade down to and not including dermis, right foot ulceration  - Applied betadine to the periwound, bacitracin ointment to wound bed, DSD, and ace bandage for compression to with

## 2019-09-16 ENCOUNTER — OFFICE VISIT (OUTPATIENT)
Dept: INTERNAL MEDICINE CLINIC | Age: 54
End: 2019-09-16
Payer: COMMERCIAL

## 2019-09-16 DIAGNOSIS — L97.522 DIABETIC ULCER OF LEFT FOOT ASSOCIATED WITH TYPE 2 DIABETES MELLITUS, WITH FAT LAYER EXPOSED, UNSPECIFIED PART OF FOOT (HCC): Primary | ICD-10-CM

## 2019-09-16 DIAGNOSIS — L97.412 DIABETIC ULCER OF RIGHT MIDFOOT ASSOCIATED WITH TYPE 2 DIABETES MELLITUS, WITH FAT LAYER EXPOSED (HCC): ICD-10-CM

## 2019-09-16 DIAGNOSIS — E11.621 DIABETIC ULCER OF LEFT FOOT ASSOCIATED WITH TYPE 2 DIABETES MELLITUS, WITH FAT LAYER EXPOSED, UNSPECIFIED PART OF FOOT (HCC): Primary | ICD-10-CM

## 2019-09-16 DIAGNOSIS — E11.621 DIABETIC ULCER OF RIGHT MIDFOOT ASSOCIATED WITH TYPE 2 DIABETES MELLITUS, WITH FAT LAYER EXPOSED (HCC): ICD-10-CM

## 2019-09-16 PROCEDURE — 99213 OFFICE O/P EST LOW 20 MIN: CPT | Performed by: PODIATRIST

## 2019-09-16 PROCEDURE — 29405 APPL SHORT LEG CAST: CPT

## 2019-09-23 ENCOUNTER — OFFICE VISIT (OUTPATIENT)
Dept: INTERNAL MEDICINE CLINIC | Age: 54
End: 2019-09-23
Payer: COMMERCIAL

## 2019-09-23 DIAGNOSIS — E11.621 DIABETIC ULCER OF RIGHT MIDFOOT ASSOCIATED WITH TYPE 2 DIABETES MELLITUS, LIMITED TO BREAKDOWN OF SKIN (HCC): Primary | ICD-10-CM

## 2019-09-23 DIAGNOSIS — L97.521 DIABETIC ULCER OF TOE OF LEFT FOOT ASSOCIATED WITH TYPE 2 DIABETES MELLITUS, LIMITED TO BREAKDOWN OF SKIN (HCC): ICD-10-CM

## 2019-09-23 DIAGNOSIS — L97.411 DIABETIC ULCER OF RIGHT MIDFOOT ASSOCIATED WITH TYPE 2 DIABETES MELLITUS, LIMITED TO BREAKDOWN OF SKIN (HCC): Primary | ICD-10-CM

## 2019-09-23 DIAGNOSIS — E11.621 DIABETIC ULCER OF TOE OF LEFT FOOT ASSOCIATED WITH TYPE 2 DIABETES MELLITUS, LIMITED TO BREAKDOWN OF SKIN (HCC): ICD-10-CM

## 2019-09-23 PROCEDURE — 99213 OFFICE O/P EST LOW 20 MIN: CPT | Performed by: PODIATRIST

## 2019-09-23 PROCEDURE — 29405 APPL SHORT LEG CAST: CPT

## 2019-09-23 NOTE — PROGRESS NOTES
wearing ACE bandage's and dirty post-op shoes b/l. Patient is alert, awake, and oriented without any signs of acute distress. VASCULAR: DP and PT pulses are palpable b/l. CFT is brisk to the remaining digits of the left foot. Skin temperature is warm to warm from proximal to distal with no focal calor noted, Right lower extremity. Diffuse non-pitting edema noted b/l.     NEUROLOGIC: Gross and epicritic sensation is diminished b/l. Protective sensation is diminished at all pedal sites b/l.     DERMATOLOGIC:  Hyperpigment discoloration noted b/l lower extremity. Left sub 1st metatarsal head full thickness ulcer with macerated periwound border. Periwound macerated tissue. After debridement wound measured 1.2cm x 2.0cm x 0.3cm. Pink granular base. No probe to bone, no drainage, no malodor, no tunneling or undermining. No signs of infection. Right plantar-lateral foot partial thickness wound with granular base measuring 2.6 cm x 4 cm x 0.1cm with macerated periwound. Minimal serous drainage. No malodor, fluctuance, tunneling, undermining, or signs of infection. MUSCULOSKELETAL: Muscle strength is 5/5 for all pedal groups tested. Transmetatarsal amputation noted to right. ASSESSMENT  - Full thickness ulceration, Carpenter 2, 2/2 DM, plantar-lateral right foot  - Full thickness ulceration, Carpenter 2, 2/2 DM, sub 1st metatarsal head left foot  - DM Type II uncontrolled with peripheral neuropathy  - History of Non-compliance  - Personal History of Nicotine Dependence    PLAN  - Evaluation and treatment x15 minutes with >50% of the time discussing with patient the etiology and treatment of their condition  - Mechanical debridement using gauze down to and including dermal tissue, left foot ulceration base. Excisional debridement of the wound edges using a curette down to and including dermal tissue with healthy bleeding wound edges. Hemostasis obtained with direct pressure.  Patient tolerated without incidence. - Mechanical debridement down to and not including dermis, right foot ulceration base. Excisional debridement of the wound edges using a curette down to and including dermal tissue with healthy bleeding wound edges. Hemostasis obtained with direct pressure. Patient tolerated without incidence. - Applied betadine to the periwound, bacitracin ointment to wound bed, DSD, and ace bandage for compression with off-loading pads in post op shoe, left foot. - Applied betadine to the periwound, bacitracin ointment to wound bed, adaptic, DSD, and a total contact cast, right foot. - f/u MRI; left foot  - Instructed patient to leave dressings clean, dry, and intact to the right foot in total contact cast.  - Instructed patient to do every other day dressing changes to the left foot consisting of triple antibiotic ointment, DSD, and ACE in post-op shoe. - Heel weight bearing for transfers only with post-op shoe to the left and total contact cast to the right.   -Instructed patient to call clinic immediately or go to the Emergency Department if she notices increased drainage, increased redness, has flu like symptoms, or signs of infection.   - Instructed patient to schedule an appointment with PCP for surgical clearance.  Will further discuss graft application at next appointment.  -Return to clinic in 1 week for wound re-check and cast change    Amada Izaguirre DPM  Podiatric Resident PGY1  [phone]  9/23/2019, 3:17 PM

## 2019-09-27 DIAGNOSIS — E11.42 DIABETIC POLYNEUROPATHY ASSOCIATED WITH TYPE 2 DIABETES MELLITUS (HCC): ICD-10-CM

## 2019-09-27 RX ORDER — GABAPENTIN 400 MG/1
400 CAPSULE ORAL 3 TIMES DAILY
Qty: 90 CAPSULE | Refills: 0 | Status: SHIPPED | OUTPATIENT
Start: 2019-09-27 | End: 2019-10-25 | Stop reason: SDUPTHER

## 2019-09-30 ENCOUNTER — OFFICE VISIT (OUTPATIENT)
Dept: INTERNAL MEDICINE CLINIC | Age: 54
End: 2019-09-30
Payer: COMMERCIAL

## 2019-09-30 DIAGNOSIS — L97.411 DIABETIC ULCER OF RIGHT MIDFOOT ASSOCIATED WITH TYPE 2 DIABETES MELLITUS, LIMITED TO BREAKDOWN OF SKIN (HCC): ICD-10-CM

## 2019-09-30 DIAGNOSIS — S91.301D OPEN WOUND OF RIGHT FOOT, SUBSEQUENT ENCOUNTER: ICD-10-CM

## 2019-09-30 DIAGNOSIS — E11.621 DIABETIC ULCER OF RIGHT MIDFOOT ASSOCIATED WITH TYPE 2 DIABETES MELLITUS, LIMITED TO BREAKDOWN OF SKIN (HCC): ICD-10-CM

## 2019-09-30 DIAGNOSIS — S91.302D OPEN WOUND OF LEFT FOOT WITH COMPLICATION, SUBSEQUENT ENCOUNTER: Primary | ICD-10-CM

## 2019-09-30 PROCEDURE — 29405 APPL SHORT LEG CAST: CPT

## 2019-09-30 PROCEDURE — 99213 OFFICE O/P EST LOW 20 MIN: CPT | Performed by: PODIATRIST

## 2019-10-03 ENCOUNTER — OFFICE VISIT (OUTPATIENT)
Dept: INTERNAL MEDICINE CLINIC | Age: 54
End: 2019-10-03
Payer: MEDICARE

## 2019-10-03 VITALS
BODY MASS INDEX: 37.17 KG/M2 | HEIGHT: 62 IN | WEIGHT: 202 LBS | SYSTOLIC BLOOD PRESSURE: 132 MMHG | OXYGEN SATURATION: 98 % | HEART RATE: 72 BPM | TEMPERATURE: 98.8 F | DIASTOLIC BLOOD PRESSURE: 78 MMHG

## 2019-10-03 DIAGNOSIS — Z01.818 PRE-OP TESTING: ICD-10-CM

## 2019-10-03 DIAGNOSIS — E11.42 TYPE 2 DIABETES MELLITUS WITH DIABETIC POLYNEUROPATHY, WITH LONG-TERM CURRENT USE OF INSULIN (HCC): ICD-10-CM

## 2019-10-03 DIAGNOSIS — Z23 NEEDS FLU SHOT: Primary | ICD-10-CM

## 2019-10-03 DIAGNOSIS — Z79.4 TYPE 2 DIABETES MELLITUS WITH DIABETIC POLYNEUROPATHY, WITH LONG-TERM CURRENT USE OF INSULIN (HCC): ICD-10-CM

## 2019-10-03 DIAGNOSIS — K21.9 GASTROESOPHAGEAL REFLUX DISEASE WITHOUT ESOPHAGITIS: ICD-10-CM

## 2019-10-03 PROCEDURE — 90686 IIV4 VACC NO PRSV 0.5 ML IM: CPT | Performed by: STUDENT IN AN ORGANIZED HEALTH CARE EDUCATION/TRAINING PROGRAM

## 2019-10-03 PROCEDURE — 6360000002 HC RX W HCPCS: Performed by: STUDENT IN AN ORGANIZED HEALTH CARE EDUCATION/TRAINING PROGRAM

## 2019-10-03 PROCEDURE — G0008 ADMIN INFLUENZA VIRUS VAC: HCPCS | Performed by: STUDENT IN AN ORGANIZED HEALTH CARE EDUCATION/TRAINING PROGRAM

## 2019-10-03 PROCEDURE — 99213 OFFICE O/P EST LOW 20 MIN: CPT | Performed by: STUDENT IN AN ORGANIZED HEALTH CARE EDUCATION/TRAINING PROGRAM

## 2019-10-03 RX ORDER — APIXABAN 5 MG/1
TABLET, FILM COATED ORAL
Refills: 2 | COMMUNITY
Start: 2019-09-20 | End: 2020-04-14 | Stop reason: SDUPTHER

## 2019-10-03 RX ORDER — OMEPRAZOLE 20 MG/1
20 CAPSULE, DELAYED RELEASE ORAL EVERY MORNING
Qty: 30 CAPSULE | Refills: 2 | Status: SHIPPED | OUTPATIENT
Start: 2019-10-03 | End: 2020-01-27 | Stop reason: SDUPTHER

## 2019-10-03 RX ORDER — ASPIRIN 325 MG
TABLET, DELAYED RELEASE (ENTERIC COATED) ORAL
Refills: 0 | Status: ON HOLD | COMMUNITY
Start: 2019-09-23 | End: 2019-10-24 | Stop reason: SDUPTHER

## 2019-10-03 RX ORDER — AMITRIPTYLINE HYDROCHLORIDE 100 MG/1
100 TABLET, FILM COATED ORAL NIGHTLY
Qty: 30 TABLET | Refills: 1 | Status: SHIPPED | OUTPATIENT
Start: 2019-10-03 | End: 2019-10-28 | Stop reason: SDUPTHER

## 2019-10-03 RX ADMIN — INFLUENZA A VIRUS A/BRISBANE/02/2018 IVR-190 (H1N1) ANTIGEN (PROPIOLACTONE INACTIVATED), INFLUENZA A VIRUS A/KANSAS/14/2017 X-327 (H3N2) ANTIGEN (PROPIOLACTONE INACTIVATED), INFLUENZA B VIRUS B/MARYLAND/15/2016 ANTIGEN (PROPIOLACTONE INACTIVATED), INFLUENZA B VIRUS B/PHUKET/3073/2013 BVR-1B ANTIGEN (PROPIOLACTONE INACTIVATED) 0.5 ML: 15; 15; 15; 15 INJECTION, SUSPENSION INTRAMUSCULAR at 13:18

## 2019-10-03 ASSESSMENT — ENCOUNTER SYMPTOMS
CONSTIPATION: 0
EYE DISCHARGE: 0
COUGH: 0
STRIDOR: 0
BACK PAIN: 1
APNEA: 0
DIARRHEA: 0
SINUS PRESSURE: 0
PHOTOPHOBIA: 0
EYE PAIN: 0
ABDOMINAL DISTENTION: 0
EYE REDNESS: 0
BLOOD IN STOOL: 0
SHORTNESS OF BREATH: 0
NAUSEA: 0
EYE ITCHING: 0
CHOKING: 0
RHINORRHEA: 0
CHEST TIGHTNESS: 0
ABDOMINAL PAIN: 0
ANAL BLEEDING: 0
WHEEZING: 0
SINUS PAIN: 0

## 2019-10-03 ASSESSMENT — PATIENT HEALTH QUESTIONNAIRE - PHQ9
2. FEELING DOWN, DEPRESSED OR HOPELESS: 0
SUM OF ALL RESPONSES TO PHQ QUESTIONS 1-9: 0
SUM OF ALL RESPONSES TO PHQ QUESTIONS 1-9: 0
1. LITTLE INTEREST OR PLEASURE IN DOING THINGS: 0
SUM OF ALL RESPONSES TO PHQ9 QUESTIONS 1 & 2: 0

## 2019-10-04 DIAGNOSIS — E11.42 TYPE 2 DIABETES MELLITUS WITH DIABETIC POLYNEUROPATHY, WITH LONG-TERM CURRENT USE OF INSULIN (HCC): Primary | ICD-10-CM

## 2019-10-04 DIAGNOSIS — Z79.4 TYPE 2 DIABETES MELLITUS WITH DIABETIC POLYNEUROPATHY, WITH LONG-TERM CURRENT USE OF INSULIN (HCC): Primary | ICD-10-CM

## 2019-10-07 ENCOUNTER — OFFICE VISIT (OUTPATIENT)
Dept: INTERNAL MEDICINE CLINIC | Age: 54
End: 2019-10-07
Payer: MEDICARE

## 2019-10-07 DIAGNOSIS — E11.42 TYPE 2 DIABETES MELLITUS WITH DIABETIC POLYNEUROPATHY, WITH LONG-TERM CURRENT USE OF INSULIN (HCC): ICD-10-CM

## 2019-10-07 DIAGNOSIS — E11.621 DIABETIC ULCER OF LEFT FOOT ASSOCIATED WITH TYPE 2 DIABETES MELLITUS, WITH FAT LAYER EXPOSED, UNSPECIFIED PART OF FOOT (HCC): Primary | ICD-10-CM

## 2019-10-07 DIAGNOSIS — Z79.4 TYPE 2 DIABETES MELLITUS WITH DIABETIC POLYNEUROPATHY, WITH LONG-TERM CURRENT USE OF INSULIN (HCC): ICD-10-CM

## 2019-10-07 DIAGNOSIS — Z01.818 PRE-OP TESTING: ICD-10-CM

## 2019-10-07 DIAGNOSIS — L97.522 DIABETIC ULCER OF LEFT FOOT ASSOCIATED WITH TYPE 2 DIABETES MELLITUS, WITH FAT LAYER EXPOSED, UNSPECIFIED PART OF FOOT (HCC): Primary | ICD-10-CM

## 2019-10-07 LAB
ANION GAP SERPL CALCULATED.3IONS-SCNC: 14 MMOL/L (ref 3–16)
BUN BLDV-MCNC: 28 MG/DL (ref 7–20)
CALCIUM SERPL-MCNC: 8.8 MG/DL (ref 8.3–10.6)
CHLORIDE BLD-SCNC: 100 MMOL/L (ref 99–110)
CHOLESTEROL, TOTAL: 124 MG/DL (ref 0–199)
CO2: 23 MMOL/L (ref 21–32)
CREAT SERPL-MCNC: 1.6 MG/DL (ref 0.6–1.1)
GFR AFRICAN AMERICAN: 40
GFR NON-AFRICAN AMERICAN: 33
GLUCOSE BLD-MCNC: 252 MG/DL (ref 70–99)
HDLC SERPL-MCNC: 41 MG/DL (ref 40–60)
LDL CHOLESTEROL CALCULATED: 39 MG/DL
POTASSIUM SERPL-SCNC: 5.2 MMOL/L (ref 3.5–5.1)
SODIUM BLD-SCNC: 137 MMOL/L (ref 136–145)
TRIGL SERPL-MCNC: 221 MG/DL (ref 0–150)
VLDLC SERPL CALC-MCNC: 44 MG/DL

## 2019-10-07 PROCEDURE — 99213 OFFICE O/P EST LOW 20 MIN: CPT | Performed by: PODIATRIST

## 2019-10-07 PROCEDURE — 29405 APPL SHORT LEG CAST: CPT

## 2019-10-08 ENCOUNTER — TELEPHONE (OUTPATIENT)
Dept: INTERNAL MEDICINE CLINIC | Age: 54
End: 2019-10-08

## 2019-10-08 LAB
ESTIMATED AVERAGE GLUCOSE: 203 MG/DL
HBA1C MFR BLD: 8.7 %

## 2019-10-14 ENCOUNTER — OFFICE VISIT (OUTPATIENT)
Dept: INTERNAL MEDICINE CLINIC | Age: 54
End: 2019-10-14
Payer: MEDICARE

## 2019-10-14 DIAGNOSIS — S91.302D OPEN WOUND OF LEFT FOOT WITH COMPLICATION, SUBSEQUENT ENCOUNTER: Primary | ICD-10-CM

## 2019-10-14 DIAGNOSIS — S91.301D OPEN WOUND OF RIGHT FOOT, SUBSEQUENT ENCOUNTER: ICD-10-CM

## 2019-10-14 PROCEDURE — 29405 APPL SHORT LEG CAST: CPT

## 2019-10-14 PROCEDURE — 29580 STRAPPING UNNA BOOT: CPT

## 2019-10-14 PROCEDURE — 99213 OFFICE O/P EST LOW 20 MIN: CPT | Performed by: PODIATRIST

## 2019-10-21 ENCOUNTER — OFFICE VISIT (OUTPATIENT)
Dept: INTERNAL MEDICINE CLINIC | Age: 54
End: 2019-10-21
Payer: MEDICARE

## 2019-10-21 DIAGNOSIS — S91.302D OPEN WOUND OF LEFT FOOT WITH COMPLICATION, SUBSEQUENT ENCOUNTER: ICD-10-CM

## 2019-10-21 DIAGNOSIS — Z98.890 S/P FOOT SURGERY, RIGHT: Primary | ICD-10-CM

## 2019-10-21 PROCEDURE — 99213 OFFICE O/P EST LOW 20 MIN: CPT | Performed by: PODIATRIST

## 2019-10-21 PROCEDURE — 29405 APPL SHORT LEG CAST: CPT

## 2019-10-21 PROCEDURE — 29580 STRAPPING UNNA BOOT: CPT

## 2019-10-22 ENCOUNTER — ANESTHESIA EVENT (OUTPATIENT)
Dept: OPERATING ROOM | Age: 54
End: 2019-10-22
Payer: MEDICARE

## 2019-10-23 ENCOUNTER — HOSPITAL ENCOUNTER (OUTPATIENT)
Age: 54
Setting detail: OBSERVATION
Discharge: HOME | End: 2019-10-24
Attending: PODIATRIST | Admitting: INTERNAL MEDICINE
Payer: MEDICARE

## 2019-10-23 ENCOUNTER — ANESTHESIA (OUTPATIENT)
Dept: OPERATING ROOM | Age: 54
End: 2019-10-23
Payer: MEDICARE

## 2019-10-23 VITALS
DIASTOLIC BLOOD PRESSURE: 70 MMHG | RESPIRATION RATE: 7 BRPM | OXYGEN SATURATION: 94 % | SYSTOLIC BLOOD PRESSURE: 124 MMHG

## 2019-10-23 DIAGNOSIS — N17.9 AKI (ACUTE KIDNEY INJURY) (HCC): Primary | ICD-10-CM

## 2019-10-23 PROBLEM — G93.40 ENCEPHALOPATHY: Status: ACTIVE | Noted: 2019-10-23

## 2019-10-23 LAB
ANION GAP SERPL CALCULATED.3IONS-SCNC: 14 MMOL/L (ref 3–16)
BUN BLDV-MCNC: 44 MG/DL (ref 7–20)
CALCIUM SERPL-MCNC: 9 MG/DL (ref 8.3–10.6)
CHLORIDE BLD-SCNC: 98 MMOL/L (ref 99–110)
CO2: 25 MMOL/L (ref 21–32)
CREAT SERPL-MCNC: 2.4 MG/DL (ref 0.6–1.1)
GFR AFRICAN AMERICAN: 25
GFR NON-AFRICAN AMERICAN: 21
GLUCOSE BLD-MCNC: 174 MG/DL (ref 70–99)
GLUCOSE BLD-MCNC: 182 MG/DL (ref 70–99)
GLUCOSE BLD-MCNC: 351 MG/DL (ref 70–99)
GLUCOSE BLD-MCNC: 91 MG/DL (ref 70–99)
PERFORMED ON: ABNORMAL
PERFORMED ON: ABNORMAL
PERFORMED ON: NORMAL
POTASSIUM SERPL-SCNC: 4.7 MMOL/L (ref 3.5–5.1)
SODIUM BLD-SCNC: 137 MMOL/L (ref 136–145)

## 2019-10-23 PROCEDURE — 2500000003 HC RX 250 WO HCPCS: Performed by: PODIATRIST

## 2019-10-23 PROCEDURE — 2580000003 HC RX 258: Performed by: INTERNAL MEDICINE

## 2019-10-23 PROCEDURE — 3700000000 HC ANESTHESIA ATTENDED CARE: Performed by: PODIATRIST

## 2019-10-23 PROCEDURE — 2709999900 HC NON-CHARGEABLE SUPPLY: Performed by: PODIATRIST

## 2019-10-23 PROCEDURE — G0378 HOSPITAL OBSERVATION PER HR: HCPCS

## 2019-10-23 PROCEDURE — 2780000010 HC IMPLANT OTHER: Performed by: PODIATRIST

## 2019-10-23 PROCEDURE — 94761 N-INVAS EAR/PLS OXIMETRY MLT: CPT

## 2019-10-23 PROCEDURE — 2580000003 HC RX 258: Performed by: PODIATRIST

## 2019-10-23 PROCEDURE — 3600000012 HC SURGERY LEVEL 2 ADDTL 15MIN: Performed by: PODIATRIST

## 2019-10-23 PROCEDURE — 2580000003 HC RX 258: Performed by: ANESTHESIOLOGY

## 2019-10-23 PROCEDURE — 2500000003 HC RX 250 WO HCPCS: Performed by: ANESTHESIOLOGY

## 2019-10-23 PROCEDURE — 6360000002 HC RX W HCPCS: Performed by: ANESTHESIOLOGY

## 2019-10-23 PROCEDURE — 2700000000 HC OXYGEN THERAPY PER DAY

## 2019-10-23 PROCEDURE — 6360000002 HC RX W HCPCS: Performed by: PODIATRIST

## 2019-10-23 PROCEDURE — 3600000002 HC SURGERY LEVEL 2 BASE: Performed by: PODIATRIST

## 2019-10-23 PROCEDURE — 7100000001 HC PACU RECOVERY - ADDTL 15 MIN: Performed by: PODIATRIST

## 2019-10-23 PROCEDURE — 3700000001 HC ADD 15 MINUTES (ANESTHESIA): Performed by: PODIATRIST

## 2019-10-23 PROCEDURE — 6370000000 HC RX 637 (ALT 250 FOR IP): Performed by: INTERNAL MEDICINE

## 2019-10-23 PROCEDURE — 80048 BASIC METABOLIC PNL TOTAL CA: CPT

## 2019-10-23 PROCEDURE — 7100000000 HC PACU RECOVERY - FIRST 15 MIN: Performed by: PODIATRIST

## 2019-10-23 DEVICE — GRAFT HUM TISS W3XL6CM CRYOPRESERVED PLCNTA TISS UMB AMNION: Type: IMPLANTABLE DEVICE | Site: FOOT | Status: FUNCTIONAL

## 2019-10-23 DEVICE — ALLOGRAFT HUM TISS 1 CC AMNION AMNIFLO CRYOPRESERVED: Type: IMPLANTABLE DEVICE | Site: FOOT | Status: FUNCTIONAL

## 2019-10-23 RX ORDER — SODIUM CHLORIDE 0.9 % (FLUSH) 0.9 %
10 SYRINGE (ML) INJECTION EVERY 12 HOURS SCHEDULED
Status: DISCONTINUED | OUTPATIENT
Start: 2019-10-23 | End: 2019-10-23 | Stop reason: HOSPADM

## 2019-10-23 RX ORDER — SODIUM CHLORIDE 0.9 % (FLUSH) 0.9 %
10 SYRINGE (ML) INJECTION PRN
Status: DISCONTINUED | OUTPATIENT
Start: 2019-10-23 | End: 2019-10-23 | Stop reason: HOSPADM

## 2019-10-23 RX ORDER — ASPIRIN 81 MG/1
81 TABLET ORAL DAILY
Status: DISCONTINUED | OUTPATIENT
Start: 2019-10-23 | End: 2019-10-24 | Stop reason: HOSPADM

## 2019-10-23 RX ORDER — FENTANYL CITRATE 50 UG/ML
25 INJECTION, SOLUTION INTRAMUSCULAR; INTRAVENOUS EVERY 5 MIN PRN
Status: DISCONTINUED | OUTPATIENT
Start: 2019-10-23 | End: 2019-10-23 | Stop reason: HOSPADM

## 2019-10-23 RX ORDER — INSULIN LISPRO 100 [IU]/ML
0-3 INJECTION, SOLUTION INTRAVENOUS; SUBCUTANEOUS NIGHTLY
Status: DISCONTINUED | OUTPATIENT
Start: 2019-10-23 | End: 2019-10-24 | Stop reason: HOSPADM

## 2019-10-23 RX ORDER — METOPROLOL SUCCINATE 50 MG/1
50 TABLET, EXTENDED RELEASE ORAL NIGHTLY
Status: DISCONTINUED | OUTPATIENT
Start: 2019-10-23 | End: 2019-10-24 | Stop reason: HOSPADM

## 2019-10-23 RX ORDER — GLYCOPYRROLATE 0.2 MG/ML
0.2 INJECTION INTRAMUSCULAR; INTRAVENOUS ONCE
Status: COMPLETED | OUTPATIENT
Start: 2019-10-23 | End: 2019-10-23

## 2019-10-23 RX ORDER — BUPIVACAINE HYDROCHLORIDE 2.5 MG/ML
INJECTION, SOLUTION EPIDURAL; INFILTRATION; INTRACAUDAL PRN
Status: DISCONTINUED | OUTPATIENT
Start: 2019-10-23 | End: 2019-10-23 | Stop reason: SDUPTHER

## 2019-10-23 RX ORDER — ENALAPRILAT 2.5 MG/2ML
1.25 INJECTION INTRAVENOUS
Status: DISCONTINUED | OUTPATIENT
Start: 2019-10-23 | End: 2019-10-23 | Stop reason: HOSPADM

## 2019-10-23 RX ORDER — NICOTINE POLACRILEX 4 MG
15 LOZENGE BUCCAL PRN
Status: DISCONTINUED | OUTPATIENT
Start: 2019-10-23 | End: 2019-10-24 | Stop reason: HOSPADM

## 2019-10-23 RX ORDER — ONDANSETRON 2 MG/ML
4 INJECTION INTRAMUSCULAR; INTRAVENOUS ONCE
Status: COMPLETED | OUTPATIENT
Start: 2019-10-23 | End: 2019-10-23

## 2019-10-23 RX ORDER — ONDANSETRON 2 MG/ML
4 INJECTION INTRAMUSCULAR; INTRAVENOUS EVERY 6 HOURS PRN
Status: DISCONTINUED | OUTPATIENT
Start: 2019-10-23 | End: 2019-10-24 | Stop reason: HOSPADM

## 2019-10-23 RX ORDER — SODIUM CHLORIDE 0.9 % (FLUSH) 0.9 %
10 SYRINGE (ML) INJECTION PRN
Status: DISCONTINUED | OUTPATIENT
Start: 2019-10-23 | End: 2019-10-24 | Stop reason: HOSPADM

## 2019-10-23 RX ORDER — BUPIVACAINE HYDROCHLORIDE 2.5 MG/ML
INJECTION, SOLUTION EPIDURAL; INFILTRATION; INTRACAUDAL PRN
Status: DISCONTINUED | OUTPATIENT
Start: 2019-10-23 | End: 2019-10-23 | Stop reason: HOSPADM

## 2019-10-23 RX ORDER — LABETALOL 20 MG/4 ML (5 MG/ML) INTRAVENOUS SYRINGE
5 EVERY 10 MIN PRN
Status: DISCONTINUED | OUTPATIENT
Start: 2019-10-23 | End: 2019-10-23 | Stop reason: HOSPADM

## 2019-10-23 RX ORDER — INSULIN LISPRO 100 [IU]/ML
5 INJECTION, SOLUTION INTRAVENOUS; SUBCUTANEOUS
Status: DISCONTINUED | OUTPATIENT
Start: 2019-10-23 | End: 2019-10-24 | Stop reason: HOSPADM

## 2019-10-23 RX ORDER — ATORVASTATIN CALCIUM 20 MG/1
20 TABLET, FILM COATED ORAL NIGHTLY
Status: DISCONTINUED | OUTPATIENT
Start: 2019-10-23 | End: 2019-10-24 | Stop reason: HOSPADM

## 2019-10-23 RX ORDER — BUPIVACAINE HYDROCHLORIDE 5 MG/ML
INJECTION, SOLUTION EPIDURAL; INTRACAUDAL
Status: COMPLETED
Start: 2019-10-23 | End: 2019-10-23

## 2019-10-23 RX ORDER — ACETAMINOPHEN 325 MG/1
650 TABLET ORAL EVERY 4 HOURS PRN
Status: DISCONTINUED | OUTPATIENT
Start: 2019-10-23 | End: 2019-10-24 | Stop reason: HOSPADM

## 2019-10-23 RX ORDER — DEXTROSE MONOHYDRATE 25 G/50ML
12.5 INJECTION, SOLUTION INTRAVENOUS PRN
Status: DISCONTINUED | OUTPATIENT
Start: 2019-10-23 | End: 2019-10-24 | Stop reason: HOSPADM

## 2019-10-23 RX ORDER — IPRATROPIUM BROMIDE AND ALBUTEROL SULFATE 2.5; .5 MG/3ML; MG/3ML
1 SOLUTION RESPIRATORY (INHALATION)
Status: DISCONTINUED | OUTPATIENT
Start: 2019-10-23 | End: 2019-10-23

## 2019-10-23 RX ORDER — SODIUM CHLORIDE 9 MG/ML
INJECTION, SOLUTION INTRAVENOUS CONTINUOUS
Status: ACTIVE | OUTPATIENT
Start: 2019-10-23 | End: 2019-10-24

## 2019-10-23 RX ORDER — DEXTROSE MONOHYDRATE 50 MG/ML
100 INJECTION, SOLUTION INTRAVENOUS PRN
Status: DISCONTINUED | OUTPATIENT
Start: 2019-10-23 | End: 2019-10-24 | Stop reason: HOSPADM

## 2019-10-23 RX ORDER — SODIUM CHLORIDE 0.9 % (FLUSH) 0.9 %
10 SYRINGE (ML) INJECTION EVERY 12 HOURS SCHEDULED
Status: DISCONTINUED | OUTPATIENT
Start: 2019-10-23 | End: 2019-10-24 | Stop reason: HOSPADM

## 2019-10-23 RX ORDER — SODIUM CHLORIDE, SODIUM LACTATE, POTASSIUM CHLORIDE, CALCIUM CHLORIDE 600; 310; 30; 20 MG/100ML; MG/100ML; MG/100ML; MG/100ML
INJECTION, SOLUTION INTRAVENOUS CONTINUOUS
Status: DISCONTINUED | OUTPATIENT
Start: 2019-10-23 | End: 2019-10-23

## 2019-10-23 RX ORDER — INSULIN LISPRO 100 [IU]/ML
0-6 INJECTION, SOLUTION INTRAVENOUS; SUBCUTANEOUS
Status: DISCONTINUED | OUTPATIENT
Start: 2019-10-23 | End: 2019-10-24 | Stop reason: HOSPADM

## 2019-10-23 RX ORDER — ONDANSETRON 2 MG/ML
4 INJECTION INTRAMUSCULAR; INTRAVENOUS
Status: DISCONTINUED | OUTPATIENT
Start: 2019-10-23 | End: 2019-10-23 | Stop reason: HOSPADM

## 2019-10-23 RX ORDER — LIDOCAINE HYDROCHLORIDE 10 MG/ML
1 INJECTION, SOLUTION EPIDURAL; INFILTRATION; INTRACAUDAL; PERINEURAL
Status: DISCONTINUED | OUTPATIENT
Start: 2019-10-23 | End: 2019-10-23 | Stop reason: HOSPADM

## 2019-10-23 RX ORDER — BUPIVACAINE HYDROCHLORIDE 5 MG/ML
INJECTION, SOLUTION EPIDURAL; INTRACAUDAL PRN
Status: DISCONTINUED | OUTPATIENT
Start: 2019-10-23 | End: 2019-10-23 | Stop reason: SDUPTHER

## 2019-10-23 RX ORDER — ALBUTEROL SULFATE 2.5 MG/3ML
2.5 SOLUTION RESPIRATORY (INHALATION) EVERY 6 HOURS PRN
Status: DISCONTINUED | OUTPATIENT
Start: 2019-10-23 | End: 2019-10-24 | Stop reason: HOSPADM

## 2019-10-23 RX ORDER — ROPIVACAINE HYDROCHLORIDE 5 MG/ML
INJECTION, SOLUTION EPIDURAL; INFILTRATION; PERINEURAL
Status: DISPENSED
Start: 2019-10-23 | End: 2019-10-23

## 2019-10-23 RX ORDER — HYDRALAZINE HYDROCHLORIDE 20 MG/ML
5 INJECTION INTRAMUSCULAR; INTRAVENOUS EVERY 5 MIN PRN
Status: DISCONTINUED | OUTPATIENT
Start: 2019-10-23 | End: 2019-10-23 | Stop reason: HOSPADM

## 2019-10-23 RX ORDER — MAGNESIUM HYDROXIDE 1200 MG/15ML
LIQUID ORAL CONTINUOUS PRN
Status: COMPLETED | OUTPATIENT
Start: 2019-10-23 | End: 2019-10-23

## 2019-10-23 RX ORDER — MORPHINE SULFATE 4 MG/ML
2 INJECTION, SOLUTION INTRAMUSCULAR; INTRAVENOUS EVERY 5 MIN PRN
Status: DISCONTINUED | OUTPATIENT
Start: 2019-10-23 | End: 2019-10-23 | Stop reason: HOSPADM

## 2019-10-23 RX ORDER — IPRATROPIUM BROMIDE AND ALBUTEROL SULFATE 2.5; .5 MG/3ML; MG/3ML
1 SOLUTION RESPIRATORY (INHALATION) EVERY 4 HOURS PRN
Status: DISCONTINUED | OUTPATIENT
Start: 2019-10-23 | End: 2019-10-24 | Stop reason: HOSPADM

## 2019-10-23 RX ADMIN — BUPIVACAINE HYDROCHLORIDE 25 ML: 5 INJECTION, SOLUTION EPIDURAL; INTRACAUDAL; PERINEURAL at 12:08

## 2019-10-23 RX ADMIN — SODIUM CHLORIDE: 9 INJECTION, SOLUTION INTRAVENOUS at 18:52

## 2019-10-23 RX ADMIN — METOPROLOL SUCCINATE 50 MG: 50 TABLET, EXTENDED RELEASE ORAL at 21:58

## 2019-10-23 RX ADMIN — ONDANSETRON 4 MG: 2 INJECTION INTRAMUSCULAR; INTRAVENOUS at 10:17

## 2019-10-23 RX ADMIN — ATORVASTATIN CALCIUM 20 MG: 20 TABLET, FILM COATED ORAL at 21:58

## 2019-10-23 RX ADMIN — INSULIN LISPRO 5 UNITS: 100 INJECTION, SOLUTION INTRAVENOUS; SUBCUTANEOUS at 22:02

## 2019-10-23 RX ADMIN — INSULIN LISPRO 5 UNITS: 100 INJECTION, SOLUTION INTRAVENOUS; SUBCUTANEOUS at 22:07

## 2019-10-23 RX ADMIN — BUPIVACAINE HYDROCHLORIDE 25 ML: 2.5 INJECTION, SOLUTION EPIDURAL; INFILTRATION; INTRACAUDAL; PERINEURAL at 12:04

## 2019-10-23 RX ADMIN — GLYCOPYRROLATE 0.2 MG: 0.2 INJECTION, SOLUTION INTRAMUSCULAR; INTRAVENOUS at 10:27

## 2019-10-23 RX ADMIN — SODIUM CHLORIDE, SODIUM LACTATE, POTASSIUM CHLORIDE, AND CALCIUM CHLORIDE: 600; 310; 30; 20 INJECTION, SOLUTION INTRAVENOUS at 09:33

## 2019-10-23 RX ADMIN — SODIUM CHLORIDE, SODIUM LACTATE, POTASSIUM CHLORIDE, AND CALCIUM CHLORIDE: 600; 310; 30; 20 INJECTION, SOLUTION INTRAVENOUS at 12:10

## 2019-10-23 RX ADMIN — APIXABAN 5 MG: 5 TABLET, FILM COATED ORAL at 21:58

## 2019-10-23 RX ADMIN — INSULIN GLARGINE 30 UNITS: 100 INJECTION, SOLUTION SUBCUTANEOUS at 22:02

## 2019-10-23 ASSESSMENT — PAIN SCALES - GENERAL
PAINLEVEL_OUTOF10: 0

## 2019-10-23 ASSESSMENT — PULMONARY FUNCTION TESTS
PIF_VALUE: 1
PIF_VALUE: 0
PIF_VALUE: 1
PIF_VALUE: 0
PIF_VALUE: 1
PIF_VALUE: 1
PIF_VALUE: 0
PIF_VALUE: 1
PIF_VALUE: 1
PIF_VALUE: 0
PIF_VALUE: 1
PIF_VALUE: 0
PIF_VALUE: 1

## 2019-10-24 VITALS
HEIGHT: 62 IN | BODY MASS INDEX: 36.8 KG/M2 | RESPIRATION RATE: 16 BRPM | SYSTOLIC BLOOD PRESSURE: 146 MMHG | HEART RATE: 69 BPM | TEMPERATURE: 98.2 F | OXYGEN SATURATION: 97 % | DIASTOLIC BLOOD PRESSURE: 82 MMHG | WEIGHT: 200 LBS

## 2019-10-24 PROBLEM — G93.40 ENCEPHALOPATHY: Status: RESOLVED | Noted: 2019-10-23 | Resolved: 2019-10-24

## 2019-10-24 LAB
ANION GAP SERPL CALCULATED.3IONS-SCNC: 8 MMOL/L (ref 3–16)
BUN BLDV-MCNC: 35 MG/DL (ref 7–20)
CALCIUM SERPL-MCNC: 8.8 MG/DL (ref 8.3–10.6)
CHLORIDE BLD-SCNC: 102 MMOL/L (ref 99–110)
CO2: 24 MMOL/L (ref 21–32)
CREAT SERPL-MCNC: 1.6 MG/DL (ref 0.6–1.1)
GFR AFRICAN AMERICAN: 40
GFR NON-AFRICAN AMERICAN: 33
GLUCOSE BLD-MCNC: 198 MG/DL (ref 70–99)
GLUCOSE BLD-MCNC: 207 MG/DL (ref 70–99)
GLUCOSE BLD-MCNC: 244 MG/DL (ref 70–99)
HCT VFR BLD CALC: 30.7 % (ref 36–48)
HEMOGLOBIN: 10 G/DL (ref 12–16)
MCH RBC QN AUTO: 27 PG (ref 26–34)
MCHC RBC AUTO-ENTMCNC: 32.5 G/DL (ref 31–36)
MCV RBC AUTO: 83 FL (ref 80–100)
PDW BLD-RTO: 16.1 % (ref 12.4–15.4)
PERFORMED ON: ABNORMAL
PERFORMED ON: ABNORMAL
PLATELET # BLD: 300 K/UL (ref 135–450)
PMV BLD AUTO: 8.2 FL (ref 5–10.5)
POTASSIUM REFLEX MAGNESIUM: 5.3 MMOL/L (ref 3.5–5.1)
RBC # BLD: 3.71 M/UL (ref 4–5.2)
SODIUM BLD-SCNC: 134 MMOL/L (ref 136–145)
WBC # BLD: 7.5 K/UL (ref 4–11)

## 2019-10-24 PROCEDURE — 85027 COMPLETE CBC AUTOMATED: CPT

## 2019-10-24 PROCEDURE — 6370000000 HC RX 637 (ALT 250 FOR IP): Performed by: INTERNAL MEDICINE

## 2019-10-24 PROCEDURE — 36415 COLL VENOUS BLD VENIPUNCTURE: CPT

## 2019-10-24 PROCEDURE — 2580000003 HC RX 258: Performed by: INTERNAL MEDICINE

## 2019-10-24 PROCEDURE — G0378 HOSPITAL OBSERVATION PER HR: HCPCS

## 2019-10-24 PROCEDURE — 80048 BASIC METABOLIC PNL TOTAL CA: CPT

## 2019-10-24 RX ORDER — ASPIRIN 81 MG/1
81 TABLET ORAL DAILY
Qty: 60 TABLET | Refills: 2 | Status: SHIPPED | OUTPATIENT
Start: 2019-10-24 | End: 2019-10-28 | Stop reason: SDUPTHER

## 2019-10-24 RX ADMIN — ACETAMINOPHEN 650 MG: 325 TABLET ORAL at 01:29

## 2019-10-24 RX ADMIN — Medication 10 ML: at 08:01

## 2019-10-24 RX ADMIN — INSULIN LISPRO 5 UNITS: 100 INJECTION, SOLUTION INTRAVENOUS; SUBCUTANEOUS at 10:21

## 2019-10-24 RX ADMIN — ASPIRIN 81 MG: 81 TABLET, COATED ORAL at 08:01

## 2019-10-24 RX ADMIN — INSULIN LISPRO 2 UNITS: 100 INJECTION, SOLUTION INTRAVENOUS; SUBCUTANEOUS at 10:20

## 2019-10-24 RX ADMIN — APIXABAN 5 MG: 5 TABLET, FILM COATED ORAL at 08:01

## 2019-10-24 ASSESSMENT — PAIN DESCRIPTION - LOCATION
LOCATION: LEG

## 2019-10-24 ASSESSMENT — PAIN DESCRIPTION - PAIN TYPE
TYPE: CHRONIC PAIN

## 2019-10-24 ASSESSMENT — PAIN SCALES - GENERAL
PAINLEVEL_OUTOF10: 3
PAINLEVEL_OUTOF10: 0
PAINLEVEL_OUTOF10: 6

## 2019-10-25 DIAGNOSIS — E11.42 DIABETIC POLYNEUROPATHY ASSOCIATED WITH TYPE 2 DIABETES MELLITUS (HCC): ICD-10-CM

## 2019-10-25 RX ORDER — GABAPENTIN 400 MG/1
400 CAPSULE ORAL 3 TIMES DAILY
Qty: 90 CAPSULE | Refills: 0 | Status: SHIPPED | OUTPATIENT
Start: 2019-10-25 | End: 2019-10-28 | Stop reason: SDUPTHER

## 2019-10-28 ENCOUNTER — OFFICE VISIT (OUTPATIENT)
Dept: INTERNAL MEDICINE CLINIC | Age: 54
End: 2019-10-28
Payer: MEDICARE

## 2019-10-28 VITALS
TEMPERATURE: 99 F | RESPIRATION RATE: 16 BRPM | SYSTOLIC BLOOD PRESSURE: 168 MMHG | OXYGEN SATURATION: 97 % | DIASTOLIC BLOOD PRESSURE: 76 MMHG | HEART RATE: 66 BPM

## 2019-10-28 DIAGNOSIS — Z79.4 TYPE 2 DIABETES MELLITUS WITH DIABETIC POLYNEUROPATHY, WITH LONG-TERM CURRENT USE OF INSULIN (HCC): ICD-10-CM

## 2019-10-28 DIAGNOSIS — Z48.89 POSTOPERATIVE VISIT: ICD-10-CM

## 2019-10-28 DIAGNOSIS — G47.33 OBSTRUCTIVE SLEEP APNEA OF ADULT: Primary | ICD-10-CM

## 2019-10-28 DIAGNOSIS — E11.42 TYPE 2 DIABETES MELLITUS WITH DIABETIC POLYNEUROPATHY, WITH LONG-TERM CURRENT USE OF INSULIN (HCC): ICD-10-CM

## 2019-10-28 DIAGNOSIS — E11.42 DIABETIC POLYNEUROPATHY ASSOCIATED WITH TYPE 2 DIABETES MELLITUS (HCC): ICD-10-CM

## 2019-10-28 PROCEDURE — 99213 OFFICE O/P EST LOW 20 MIN: CPT

## 2019-10-28 RX ORDER — GABAPENTIN 400 MG/1
400 CAPSULE ORAL 3 TIMES DAILY
Qty: 90 CAPSULE | Refills: 0 | Status: SHIPPED | OUTPATIENT
Start: 2019-10-28 | End: 2019-12-26 | Stop reason: SDUPTHER

## 2019-10-28 RX ORDER — METFORMIN HYDROCHLORIDE 500 MG/1
500 TABLET, EXTENDED RELEASE ORAL 2 TIMES DAILY
Qty: 90 TABLET | Refills: 3 | Status: SHIPPED | OUTPATIENT
Start: 2019-10-28 | End: 2020-04-14 | Stop reason: SDUPTHER

## 2019-10-28 RX ORDER — AMITRIPTYLINE HYDROCHLORIDE 100 MG/1
100 TABLET, FILM COATED ORAL NIGHTLY
Qty: 30 TABLET | Refills: 1 | Status: SHIPPED | OUTPATIENT
Start: 2019-10-28 | End: 2019-10-28

## 2019-10-28 RX ORDER — GABAPENTIN 400 MG/1
400 CAPSULE ORAL 3 TIMES DAILY
Qty: 90 CAPSULE | Refills: 0 | Status: SHIPPED | OUTPATIENT
Start: 2019-10-28 | End: 2019-10-28

## 2019-10-28 RX ORDER — ASPIRIN 81 MG/1
81 TABLET ORAL DAILY
Qty: 60 TABLET | Refills: 3 | Status: SHIPPED | OUTPATIENT
Start: 2019-10-28 | End: 2019-10-28

## 2019-10-28 RX ORDER — ASPIRIN 81 MG/1
81 TABLET ORAL DAILY
Qty: 60 TABLET | Refills: 3 | Status: SHIPPED | OUTPATIENT
Start: 2019-10-28 | End: 2020-09-18 | Stop reason: SDUPTHER

## 2019-10-28 RX ORDER — ESCITALOPRAM OXALATE 10 MG/1
10 TABLET ORAL DAILY
Qty: 30 TABLET | Refills: 1 | Status: SHIPPED | OUTPATIENT
Start: 2019-10-28 | End: 2019-10-28

## 2019-10-28 RX ORDER — ESCITALOPRAM OXALATE 10 MG/1
10 TABLET ORAL DAILY
Qty: 30 TABLET | Refills: 1 | Status: SHIPPED | OUTPATIENT
Start: 2019-10-28 | End: 2020-01-23 | Stop reason: SDUPTHER

## 2019-10-28 RX ORDER — METFORMIN HYDROCHLORIDE 500 MG/1
500 TABLET, EXTENDED RELEASE ORAL 2 TIMES DAILY
Qty: 90 TABLET | Refills: 3 | Status: SHIPPED | OUTPATIENT
Start: 2019-10-28 | End: 2019-10-28

## 2019-10-28 RX ORDER — AMITRIPTYLINE HYDROCHLORIDE 100 MG/1
100 TABLET, FILM COATED ORAL NIGHTLY
Qty: 30 TABLET | Refills: 1 | Status: SHIPPED | OUTPATIENT
Start: 2019-10-28 | End: 2019-12-26 | Stop reason: SDUPTHER

## 2019-10-28 ASSESSMENT — PATIENT HEALTH QUESTIONNAIRE - PHQ9
1. LITTLE INTEREST OR PLEASURE IN DOING THINGS: 3
8. MOVING OR SPEAKING SO SLOWLY THAT OTHER PEOPLE COULD HAVE NOTICED. OR THE OPPOSITE, BEING SO FIGETY OR RESTLESS THAT YOU HAVE BEEN MOVING AROUND A LOT MORE THAN USUAL: 0
2. FEELING DOWN, DEPRESSED OR HOPELESS: 2
SUM OF ALL RESPONSES TO PHQ9 QUESTIONS 1 & 2: 5
5. POOR APPETITE OR OVEREATING: 3
3. TROUBLE FALLING OR STAYING ASLEEP: 3
10. IF YOU CHECKED OFF ANY PROBLEMS, HOW DIFFICULT HAVE THESE PROBLEMS MADE IT FOR YOU TO DO YOUR WORK, TAKE CARE OF THINGS AT HOME, OR GET ALONG WITH OTHER PEOPLE: 2
4. FEELING TIRED OR HAVING LITTLE ENERGY: 3
SUM OF ALL RESPONSES TO PHQ QUESTIONS 1-9: 17
9. THOUGHTS THAT YOU WOULD BE BETTER OFF DEAD, OR OF HURTING YOURSELF: 0
7. TROUBLE CONCENTRATING ON THINGS, SUCH AS READING THE NEWSPAPER OR WATCHING TELEVISION: 3
SUM OF ALL RESPONSES TO PHQ QUESTIONS 1-9: 17

## 2019-10-28 ASSESSMENT — ENCOUNTER SYMPTOMS
NAUSEA: 0
VOMITING: 0
ABDOMINAL PAIN: 0
COLOR CHANGE: 0
COUGH: 0
CHOKING: 0
SHORTNESS OF BREATH: 0
CHEST TIGHTNESS: 0

## 2019-11-04 ENCOUNTER — OFFICE VISIT (OUTPATIENT)
Dept: INTERNAL MEDICINE CLINIC | Age: 54
End: 2019-11-04
Payer: MEDICARE

## 2019-11-04 DIAGNOSIS — Z98.890 S/P FOOT SURGERY, RIGHT: Primary | ICD-10-CM

## 2019-11-04 PROCEDURE — 29405 APPL SHORT LEG CAST: CPT

## 2019-11-04 PROCEDURE — 99213 OFFICE O/P EST LOW 20 MIN: CPT | Performed by: PODIATRIST

## 2019-11-11 ENCOUNTER — OFFICE VISIT (OUTPATIENT)
Dept: INTERNAL MEDICINE CLINIC | Age: 54
End: 2019-11-11
Payer: MEDICARE

## 2019-11-11 DIAGNOSIS — S91.301D OPEN WOUND OF RIGHT FOOT, SUBSEQUENT ENCOUNTER: Primary | ICD-10-CM

## 2019-11-11 PROBLEM — S91.301A OPEN WOUND OF RIGHT FOOT: Status: RESOLVED | Noted: 2018-07-03 | Resolved: 2019-11-11

## 2019-11-11 PROCEDURE — 29405 APPL SHORT LEG CAST: CPT

## 2019-11-11 PROCEDURE — 99213 OFFICE O/P EST LOW 20 MIN: CPT | Performed by: PODIATRIST

## 2019-11-18 ENCOUNTER — OFFICE VISIT (OUTPATIENT)
Dept: INTERNAL MEDICINE CLINIC | Age: 54
End: 2019-11-18
Payer: MEDICARE

## 2019-11-18 DIAGNOSIS — Z98.890 S/P FOOT SURGERY, RIGHT: Primary | ICD-10-CM

## 2019-11-18 PROCEDURE — 99213 OFFICE O/P EST LOW 20 MIN: CPT | Performed by: PODIATRIST

## 2019-11-18 PROCEDURE — 29405 APPL SHORT LEG CAST: CPT

## 2019-11-18 RX ORDER — AMMONIUM LACTATE 12 G/100G
LOTION TOPICAL
Qty: 1 BOTTLE | Refills: 3 | Status: SHIPPED | OUTPATIENT
Start: 2019-11-18 | End: 2020-09-18 | Stop reason: SDUPTHER

## 2019-11-25 ENCOUNTER — TELEPHONE (OUTPATIENT)
Dept: INTERNAL MEDICINE CLINIC | Age: 54
End: 2019-11-25

## 2019-11-25 ENCOUNTER — OFFICE VISIT (OUTPATIENT)
Dept: INTERNAL MEDICINE CLINIC | Age: 54
End: 2019-11-25
Payer: MEDICARE

## 2019-11-25 DIAGNOSIS — L97.522 DIABETIC ULCER OF LEFT FOOT ASSOCIATED WITH TYPE 2 DIABETES MELLITUS, WITH FAT LAYER EXPOSED, UNSPECIFIED PART OF FOOT (HCC): ICD-10-CM

## 2019-11-25 DIAGNOSIS — E11.621 DIABETIC ULCER OF LEFT FOOT ASSOCIATED WITH TYPE 2 DIABETES MELLITUS, WITH FAT LAYER EXPOSED, UNSPECIFIED PART OF FOOT (HCC): ICD-10-CM

## 2019-11-25 DIAGNOSIS — S91.302D OPEN WOUND OF LEFT FOOT WITH COMPLICATION, SUBSEQUENT ENCOUNTER: Primary | ICD-10-CM

## 2019-11-25 PROCEDURE — 29405 APPL SHORT LEG CAST: CPT

## 2019-11-25 PROCEDURE — 99213 OFFICE O/P EST LOW 20 MIN: CPT | Performed by: PODIATRIST

## 2019-11-25 PROCEDURE — 11042 DBRDMT SUBQ TIS 1ST 20SQCM/<: CPT

## 2019-12-02 ENCOUNTER — OFFICE VISIT (OUTPATIENT)
Dept: INTERNAL MEDICINE CLINIC | Age: 54
End: 2019-12-02
Payer: MEDICARE

## 2019-12-02 DIAGNOSIS — L97.522 DIABETIC ULCER OF LEFT FOOT ASSOCIATED WITH TYPE 2 DIABETES MELLITUS, WITH FAT LAYER EXPOSED, UNSPECIFIED PART OF FOOT (HCC): ICD-10-CM

## 2019-12-02 DIAGNOSIS — S91.302D OPEN WOUND OF LEFT FOOT WITH COMPLICATION, SUBSEQUENT ENCOUNTER: Primary | ICD-10-CM

## 2019-12-02 DIAGNOSIS — E11.621 DIABETIC ULCER OF LEFT FOOT ASSOCIATED WITH TYPE 2 DIABETES MELLITUS, WITH FAT LAYER EXPOSED, UNSPECIFIED PART OF FOOT (HCC): ICD-10-CM

## 2019-12-02 PROCEDURE — 29405 APPL SHORT LEG CAST: CPT

## 2019-12-02 PROCEDURE — 99213 OFFICE O/P EST LOW 20 MIN: CPT | Performed by: PODIATRIST

## 2019-12-09 ENCOUNTER — OFFICE VISIT (OUTPATIENT)
Dept: INTERNAL MEDICINE CLINIC | Age: 54
End: 2019-12-09
Payer: MEDICARE

## 2019-12-09 DIAGNOSIS — E11.621 DIABETIC ULCER OF LEFT FOOT ASSOCIATED WITH TYPE 2 DIABETES MELLITUS, WITH FAT LAYER EXPOSED, UNSPECIFIED PART OF FOOT (HCC): Primary | ICD-10-CM

## 2019-12-09 DIAGNOSIS — L97.522 DIABETIC ULCER OF LEFT FOOT ASSOCIATED WITH TYPE 2 DIABETES MELLITUS, WITH FAT LAYER EXPOSED, UNSPECIFIED PART OF FOOT (HCC): Primary | ICD-10-CM

## 2019-12-09 PROCEDURE — 29405 APPL SHORT LEG CAST: CPT

## 2019-12-09 PROCEDURE — 99213 OFFICE O/P EST LOW 20 MIN: CPT | Performed by: PODIATRIST

## 2019-12-16 ENCOUNTER — OFFICE VISIT (OUTPATIENT)
Dept: INTERNAL MEDICINE CLINIC | Age: 54
End: 2019-12-16
Payer: MEDICARE

## 2019-12-16 DIAGNOSIS — S91.302D OPEN WOUND OF LEFT FOOT WITH COMPLICATION, SUBSEQUENT ENCOUNTER: Primary | ICD-10-CM

## 2019-12-16 DIAGNOSIS — E11.42 TYPE 2 DIABETES MELLITUS WITH DIABETIC POLYNEUROPATHY (HCC): ICD-10-CM

## 2019-12-16 DIAGNOSIS — Z48.89 POSTOPERATIVE VISIT: ICD-10-CM

## 2019-12-16 PROCEDURE — 29405 APPL SHORT LEG CAST: CPT

## 2019-12-16 PROCEDURE — 99213 OFFICE O/P EST LOW 20 MIN: CPT | Performed by: PODIATRIST

## 2019-12-16 RX ORDER — LANCETS
1 EACH MISCELLANEOUS 2 TIMES DAILY
Qty: 200 EACH | Refills: 5 | Status: SHIPPED | OUTPATIENT
Start: 2019-12-16 | End: 2020-03-23 | Stop reason: SDUPTHER

## 2019-12-23 ENCOUNTER — OFFICE VISIT (OUTPATIENT)
Dept: INTERNAL MEDICINE CLINIC | Age: 54
End: 2019-12-23
Payer: MEDICARE

## 2019-12-23 DIAGNOSIS — S91.302D OPEN WOUND OF LEFT FOOT WITH COMPLICATION, SUBSEQUENT ENCOUNTER: ICD-10-CM

## 2019-12-23 DIAGNOSIS — Z48.89 POSTOPERATIVE VISIT: Primary | ICD-10-CM

## 2019-12-23 PROCEDURE — 99213 OFFICE O/P EST LOW 20 MIN: CPT | Performed by: PODIATRIST

## 2019-12-23 PROCEDURE — 29405 APPL SHORT LEG CAST: CPT

## 2019-12-23 RX ORDER — GABAPENTIN 400 MG/1
400 CAPSULE ORAL 3 TIMES DAILY
Qty: 90 CAPSULE | Refills: 0 | Status: CANCELLED | OUTPATIENT
Start: 2019-12-23 | End: 2020-01-22

## 2019-12-23 RX ORDER — AMITRIPTYLINE HYDROCHLORIDE 100 MG/1
100 TABLET, FILM COATED ORAL NIGHTLY
Qty: 30 TABLET | Refills: 1 | Status: CANCELLED | OUTPATIENT
Start: 2019-12-23

## 2019-12-23 NOTE — TELEPHONE ENCOUNTER
Can be refilled. Please forward to another provider at the clinic due to NPI temporary issue.  Thank you

## 2019-12-23 NOTE — TELEPHONE ENCOUNTER
Last visit 10-28-19 Dr Deleon Monday   Next visit  12-30-19   Last refill 10-28-19 gabapention 10-28-19 amitriptyline

## 2019-12-26 ENCOUNTER — TELEPHONE (OUTPATIENT)
Dept: INTERNAL MEDICINE CLINIC | Age: 54
End: 2019-12-26

## 2019-12-26 DIAGNOSIS — Z79.4 TYPE 2 DIABETES MELLITUS WITH DIABETIC POLYNEUROPATHY, WITH LONG-TERM CURRENT USE OF INSULIN (HCC): ICD-10-CM

## 2019-12-26 DIAGNOSIS — E11.42 DIABETIC POLYNEUROPATHY ASSOCIATED WITH TYPE 2 DIABETES MELLITUS (HCC): ICD-10-CM

## 2019-12-26 DIAGNOSIS — E11.42 TYPE 2 DIABETES MELLITUS WITH DIABETIC POLYNEUROPATHY, WITH LONG-TERM CURRENT USE OF INSULIN (HCC): ICD-10-CM

## 2019-12-26 RX ORDER — AMITRIPTYLINE HYDROCHLORIDE 100 MG/1
100 TABLET, FILM COATED ORAL NIGHTLY
Qty: 30 TABLET | Refills: 1 | Status: SHIPPED | OUTPATIENT
Start: 2019-12-26 | End: 2020-01-23 | Stop reason: SDUPTHER

## 2019-12-26 RX ORDER — GABAPENTIN 400 MG/1
400 CAPSULE ORAL 3 TIMES DAILY
Qty: 90 CAPSULE | Refills: 0 | Status: SHIPPED | OUTPATIENT
Start: 2019-12-26 | End: 2020-01-23 | Stop reason: SDUPTHER

## 2019-12-30 ENCOUNTER — OFFICE VISIT (OUTPATIENT)
Dept: INTERNAL MEDICINE CLINIC | Age: 54
End: 2019-12-30
Payer: MEDICARE

## 2019-12-30 PROCEDURE — 29405 APPL SHORT LEG CAST: CPT

## 2019-12-30 PROCEDURE — 99213 OFFICE O/P EST LOW 20 MIN: CPT | Performed by: PODIATRIST

## 2019-12-30 NOTE — PROGRESS NOTES
OUTPATIENT SPECIALTY PODIATRY VISIT      Nursing Progress Note      December 30, 2019  Leopoldo Rancher    Patient description of the problem: wound check    Observations: cast intact    Ambulates:  assistance    Gait:      Assistive Devices: wheelchair    Fall History: No    Foot Hygiene: poor    Foot Wear Proper: Yes    Impaired Skin Integrity: Yes      Pain Assessment:  Pain Present: no  Pain Score: 0/10  Pain Quality/Description:   Pain Onset:   ago  Pain Goal of patient: /10    Education Assessment:    Identify the learner who is being assessed for education:  patient                    Ability to Learn:  Exhibits ability to grasp concepts and respond to questions: Medium  Ready to Learn: No  calm   Preferred Method of Learning:  written  Barriers to Learning: Verbalizes interest  Special Considerations due to cultural, Anglican, spiritual beliefs:  No  Language:  English  :  No    Ernesto Modi Page  8:44 AM 12/30/2019

## 2019-12-30 NOTE — PROGRESS NOTES
Department of Podiatry  Resident Progress Note    Karrie Rene  Allergies: Sulfa antibiotics    SUBJECTIVE  The patient is a 62 y.o. female who presents to clinic for follow up post-op visit #09 S/P I&D and application of Stravix graft; Left foot (DOS 10/23/2019) that failed 2/2 patient noncompliance with her weightbearing status. Patient patient states that she has been on her left foot more. Patient has not gone to  yet for her toe filler to the right foot due to insurance issues. Patient states that she is going to call her insurance after her appointment. Patient also admits that she is happy that the [employer] one their second game. patient is a diabetic, and does not check her blood sugar. Patient denies nausea, vomiting, fever, chills, shortness of breath, or calf pain. Patient denies any other pedal complaints. Lab Results   Component Value Date    LABA1C 8.7 10/07/2019       ROS: A 10 point review of systems was conducted, significant findings as noted in HPI. All other systems negative.     Past Medical History:        Diagnosis Date    Amenorrhea     Anomalies of nails     Asthma 5/14/04    Athlete's foot 8/2010    Bacterial vaginosis 04/2008    Carpal tunnel syndrome 5/2007    [facility] [address]     Diabetes mellitus type II 08/2007    Diabetic neuropathy ([facility] [address] 8/10    DVT (deep venous thrombosis) ([facility] [address] 3/2004    Dyslipidemia 5/2009    Dyspareunia 05/2009    ETOH abuse 3/04/2007    Feet clawing     HTN (hypertension)     Hx of blood clots     Hyperlipidemia     MRSA (methicillin resistant staph aureus) culture positive 11/06/2017; 11/17/2017    foot; leg     Neuropathy 05/2009    polyneuropathy    Pain, back 04/2008    Pain, eye, right 5/14/04    Pancreatitis 5/14/04    Pseudocyst, pancreas 5/14/04    Scalp lesion 08/2007    Tinea pedis     Tobacco abuse 03/2008    Vaginal bleeding, abnormal 6/2007    Wears dentures including subcutaneous tissue. <20cm2 of tissue debrided. Hemostasis was achieved with direct pressure. Patient tolerating the procedure well. - Applied Betadine to periwound, DSD, Qwick dressing, Kerlix, and EZ-TCC to the left foot. Instructed patient to leave dry and intact until next visit. Patient understood. - Instructed patient and her daughter to call  for any other options to get her toe filler to the right foot. Soon as she gets her Toe filler that she can transition from her CAM boot to her normal shoe gear. Right foot. - Instructed patient that healing will progress more if she continues to be as nonweightbearing to the left foot. Patient understood. - Instructed patient that she cannot drive with the CAM boot to the right foot. Patient understood. - Again verbally instructed to patient that the the more pressure she puts on her feet could cause a delay in healing to the left wound. - Instructed patient to elevate lower extremity as much as possible while lying and sitting down.   - Return to the clinic in 1 week for Post-op visit #11 and EZ-TCC cast removal.    Santo Babin DPM   Podiatric Resident, PGY-1  Pager: [phone]  12/30/2019, 8:32 AM

## 2020-01-01 ENCOUNTER — OFFICE VISIT (OUTPATIENT)
Dept: INTERNAL MEDICINE CLINIC | Age: 55
End: 2020-01-01
Payer: MEDICARE

## 2020-01-01 ENCOUNTER — HOSPITAL ENCOUNTER (OUTPATIENT)
Age: 55
Discharge: HOME | End: 2020-12-07
Payer: MEDICARE

## 2020-01-01 ENCOUNTER — HOSPITAL ENCOUNTER (OUTPATIENT)
Dept: GENERAL RADIOLOGY | Age: 55
Discharge: HOME | End: 2020-12-07
Payer: MEDICARE

## 2020-01-01 DIAGNOSIS — L97.522 DIABETIC ULCER OF OTHER PART OF LEFT FOOT ASSOCIATED WITH TYPE 2 DIABETES MELLITUS, WITH FAT LAYER EXPOSED (HCC): ICD-10-CM

## 2020-01-01 DIAGNOSIS — E11.621 DIABETIC ULCER OF OTHER PART OF LEFT FOOT ASSOCIATED WITH TYPE 2 DIABETES MELLITUS, WITH FAT LAYER EXPOSED (HCC): ICD-10-CM

## 2020-01-01 LAB
BASOPHILS ABSOLUTE: 0 K/UL (ref 0–0.2)
BASOPHILS RELATIVE PERCENT: 0.4 %
C-REACTIVE PROTEIN: 52.5 MG/L (ref 0–5.1)
EOSINOPHILS ABSOLUTE: 0.1 K/UL (ref 0–0.6)
EOSINOPHILS RELATIVE PERCENT: 2.2 %
HCT VFR BLD CALC: 25.9 % (ref 36–48)
HEMOGLOBIN: 8.6 G/DL (ref 12–16)
LYMPHOCYTES ABSOLUTE: 1.3 K/UL (ref 1–5.1)
LYMPHOCYTES RELATIVE PERCENT: 22.7 %
MCH RBC QN AUTO: 26.8 PG (ref 26–34)
MCHC RBC AUTO-ENTMCNC: 33.1 G/DL (ref 31–36)
MCV RBC AUTO: 81.1 FL (ref 80–100)
MONOCYTES ABSOLUTE: 0.4 K/UL (ref 0–1.3)
MONOCYTES RELATIVE PERCENT: 7.1 %
NEUTROPHILS ABSOLUTE: 4 K/UL (ref 1.7–7.7)
NEUTROPHILS RELATIVE PERCENT: 67.6 %
PDW BLD-RTO: 16.3 % (ref 12.4–15.4)
PLATELET # BLD: 238 K/UL (ref 135–450)
PMV BLD AUTO: 8.9 FL (ref 5–10.5)
RBC # BLD: 3.19 M/UL (ref 4–5.2)
SEDIMENTATION RATE, ERYTHROCYTE: 67 MM/HR (ref 0–30)
WBC # BLD: 5.9 K/UL (ref 4–11)

## 2020-01-01 PROCEDURE — 73630 X-RAY EXAM OF FOOT: CPT

## 2020-01-01 PROCEDURE — 99213 OFFICE O/P EST LOW 20 MIN: CPT | Performed by: PODIATRIST

## 2020-01-01 PROCEDURE — 11000 DBRDMT ECZ/INFECTED SKIN<10%: CPT

## 2020-01-01 RX ORDER — AMOXICILLIN AND CLAVULANATE POTASSIUM 875; 125 MG/1; MG/1
1 TABLET, FILM COATED ORAL 2 TIMES DAILY
Qty: 20 TABLET | Refills: 0 | Status: SHIPPED | OUTPATIENT
Start: 2020-01-01 | End: 2020-01-01

## 2020-01-01 RX ORDER — DOXYCYCLINE 100 MG/1
100 TABLET ORAL 2 TIMES DAILY
Qty: 20 TABLET | Refills: 0 | Status: SHIPPED | OUTPATIENT
Start: 2020-01-01 | End: 2020-01-01

## 2020-01-01 RX ORDER — OMEPRAZOLE 20 MG/1
20 CAPSULE, DELAYED RELEASE ORAL EVERY MORNING
Qty: 30 CAPSULE | Refills: 0 | Status: SHIPPED | OUTPATIENT
Start: 2020-01-01 | End: 2021-01-01 | Stop reason: SDUPTHER

## 2020-01-01 RX ORDER — INSULIN GLARGINE 100 [IU]/ML
50 INJECTION, SOLUTION SUBCUTANEOUS DAILY
Qty: 5 PEN | Refills: 5 | Status: ON HOLD | OUTPATIENT
Start: 2020-01-01 | End: 2021-01-01 | Stop reason: HOSPADM

## 2020-01-01 RX ORDER — AMOXICILLIN AND CLAVULANATE POTASSIUM 875; 125 MG/1; MG/1
1 TABLET, FILM COATED ORAL 2 TIMES DAILY
Qty: 20 TABLET | Refills: 0 | Status: SHIPPED | OUTPATIENT
Start: 2020-01-01 | End: 2021-01-01

## 2020-01-06 ENCOUNTER — OFFICE VISIT (OUTPATIENT)
Dept: INTERNAL MEDICINE CLINIC | Age: 55
End: 2020-01-06
Payer: MEDICARE

## 2020-01-06 PROCEDURE — 99213 OFFICE O/P EST LOW 20 MIN: CPT | Performed by: PODIATRIST

## 2020-01-06 PROCEDURE — 11000 DBRDMT ECZ/INFECTED SKIN<10%: CPT

## 2020-01-06 PROCEDURE — 29405 APPL SHORT LEG CAST: CPT

## 2020-01-06 NOTE — PROGRESS NOTES
abnormal 6/2007    Wears dentures        OBJECTIVE  Patient presents to the clinic accompanied by her daughter, ambulating with assistance with the aid of her wheelchair wearing an intact EZ TCC cast with dog and cat hair on the left and dirty with dog and cat hair CAM boot right with Ace bandage. Patient A&Ox3 and NAD. VASCULAR: DP and PT pulses are palpable b/l. CFT is brisk to the remaining digits of the left foot. Skin temperature is warm to warm from proximal to distal with no focal calor noted. Diffuse mild non-pitting edema noted right LE.     NEUROLOGIC: Gross and epicritic sensation is diminished b/l. Protective sensation is diminished at all pedal sites b/l.     DERMATOLOGIC:  Hyperpigment discoloration noted b/l lower extremity. Left sub 1st metatarsal head region full thickness ulcer with a mix of granular with slightly macerated borders. Wound measures 1.0 cm x 0.8 cm x 0.1 cm. No probe to bone, no drainage,  or tunneling. Slight musty malodor, but no signs of infection. Left foot webspaces 2-4 clean, dry, and intact skin. Diffuse flaky xerotic tissue noted plantar and dorsal aspect of the foot. 12/30/19 1/6/2020     Patient gave verbal consent for the picture taken at today's visit. Right foot, no open ulcers noted. Diffuse xerotic skin LE.      MUSCULOSKELETAL: Muscle strength is 5/5 for all pedal groups tested. Transmetatarsal amputation noted to right. Left foot hallux amputation.       ASSESSMENT  S/P Left foot I&D with Stravix Graft application (DOS 39/44/11)  Full thickness ulceration, Carpenter 1,  sub 1st metatarsal head left foot -slowly healing  DM Type II uncontrolled with peripheral neuropathy  History of Non-compliance  Personal History of Nicotine Dependence        PLAN  - Evaluation and treatment x30 minutes with >50% of the time discussing with patient the etiology and treatment of their condition.   - Sharp excisional debridement with a dermal curette down to and

## 2020-01-13 ENCOUNTER — OFFICE VISIT (OUTPATIENT)
Dept: INTERNAL MEDICINE CLINIC | Age: 55
End: 2020-01-13
Payer: MEDICARE

## 2020-01-13 PROCEDURE — 99213 OFFICE O/P EST LOW 20 MIN: CPT | Performed by: PODIATRIST

## 2020-01-13 PROCEDURE — 29405 APPL SHORT LEG CAST: CPT

## 2020-01-13 NOTE — PROGRESS NOTES
Department of Podiatry  Resident Progress Note    Renuka Shown  Allergies: Sulfa antibiotics    SUBJECTIVE  The patient is a 62 y.o. female who presents to clinic for follow up post-op visit #21 S/P I&D and application of Stravix graft; Left foot (DOS 10/23/2019) that failed 2/2 patient noncompliance with her weightbearing status. Patient is accompanied by her youngest daughter. Patient states she was on her feet more this weekend, but tried to rest her left leg as much as possible. Patient has forgotten again to call  to schedule appointment for a toe filler to the right foot. Patient has a healed TMA right foot. Patient did state having some tenderness to her left leg. Patient is a diabetic, and last blood sugar was checked yesterday night and was 114 mg/dL. Patient did state over the weekend that her blood sugar ran over the 300s this weekend and was not sure why it was that high. Patient denies nausea, vomiting, fever, chills, shortness of breath, calf pain or other constitutional symptoms. Patient denies any other pedal complaints. Lab Results   Component Value Date    LABA1C 8.7 10/07/2019       ROS: A 10 point review of systems was conducted, significant findings as noted in HPI. All other systems negative.     Past Medical History:        Diagnosis Date    Amenorrhea     Anomalies of nails     Asthma 5/14/04    Athlete's foot 8/2010    Bacterial vaginosis 04/2008    Carpal tunnel syndrome 5/2007    [facility] [address]     Diabetes mellitus type II 08/2007    Diabetic neuropathy ([facility] [address] 8/10    DVT (deep venous thrombosis) ([facility] [address] 3/2004    Dyslipidemia 5/2009    Dyspareunia 05/2009    ETOH abuse 3/04/2007    Feet clawing     HTN (hypertension)     Hx of blood clots     Hyperlipidemia     MRSA (methicillin resistant staph aureus) culture positive 11/06/2017; 11/17/2017    foot; leg     Neuropathy 05/2009    polyneuropathy    Pain, back 04/2008   

## 2020-01-20 ENCOUNTER — OFFICE VISIT (OUTPATIENT)
Dept: INTERNAL MEDICINE CLINIC | Age: 55
End: 2020-01-20
Payer: MEDICARE

## 2020-01-20 PROCEDURE — 29405 APPL SHORT LEG CAST: CPT

## 2020-01-20 PROCEDURE — 99213 OFFICE O/P EST LOW 20 MIN: CPT | Performed by: PODIATRIST

## 2020-01-20 NOTE — PROGRESS NOTES
OUTPATIENT SPECIALTY PODIATRY VISIT      Nursing Progress Note      January 20, 2020  Rafaela Phillips    Patient description of the problem: wound check    Observations: cast intact     Ambulates:  assistance    Gait:      Assistive Devices: wheelchair    Fall History: No    Foot Hygiene: good    Foot Wear Proper: Yes    Impaired Skin Integrity: Yes      Pain Assessment:  Pain Present: no  Pain Score: 0/10  Pain Quality/Description:   Pain Onset:   ago  Pain Goal of patient: /10    Education Assessment:    Identify the learner who is being assessed for education:  patient                    Ability to Learn:  Exhibits ability to grasp concepts and respond to questions: Medium  Ready to Learn: No  calm   Preferred Method of Learning:  written  Barriers to Learning: Verbalizes interest  Special Considerations due to cultural, Catholic, spiritual beliefs:  No  Language:  English  :  No    Brendan Phillips Page  8:21 AM 1/20/2020
subcutaneous tissue. <20cm2 of tissue debrided. Hemostasis was achieved with direct pressure. Patient tolerating the procedure well. - Applied Betadine to the macerated borders of the wound, DSD, Qwick dressing, Kerlix, and EZ-TCC to the left foot. - Patient and her daughter instructed to call  today to call if they except her insurance. - Strict Nonweightbearing to the left foot. Patient understood. - Instructed patient to elevate lower extremity as much as possible while lying and sitting down to aid in edema control.  - Instructed patient to continue to apply Ace compression therapy to the right foot. Can take off Ace Bandage at night. - Instructed patient that she cannot drive with the CAM boot to the right foot. Patient understood. - Return to the clinic in 1 week for wound check.         Zoila Cummings DPM   Podiatric Resident, PGY-1  Pager: [phone]  1/20/2020, 8:13 AM

## 2020-01-21 RX ORDER — FUROSEMIDE 40 MG/1
40 TABLET ORAL 2 TIMES DAILY
Qty: 180 TABLET | Refills: 0 | Status: SHIPPED | OUTPATIENT
Start: 2020-01-21 | End: 2020-04-21 | Stop reason: SDUPTHER

## 2020-01-21 RX ORDER — FUROSEMIDE 40 MG/1
40 TABLET ORAL 2 TIMES DAILY
Qty: 60 TABLET | Refills: 3 | Status: SHIPPED | OUTPATIENT
Start: 2020-01-21 | End: 2020-01-21 | Stop reason: SDUPTHER

## 2020-01-24 RX ORDER — ESCITALOPRAM OXALATE 10 MG/1
10 TABLET ORAL DAILY
Qty: 30 TABLET | Refills: 1 | Status: SHIPPED | OUTPATIENT
Start: 2020-01-24 | End: 2020-03-23 | Stop reason: SDUPTHER

## 2020-01-24 RX ORDER — GABAPENTIN 400 MG/1
400 CAPSULE ORAL 3 TIMES DAILY
Qty: 90 CAPSULE | Refills: 0 | Status: SHIPPED | OUTPATIENT
Start: 2020-01-24 | End: 2020-02-21 | Stop reason: SDUPTHER

## 2020-01-24 RX ORDER — AMITRIPTYLINE HYDROCHLORIDE 100 MG/1
100 TABLET, FILM COATED ORAL NIGHTLY
Qty: 30 TABLET | Refills: 1 | Status: SHIPPED | OUTPATIENT
Start: 2020-01-24 | End: 2020-02-21 | Stop reason: SDUPTHER

## 2020-01-27 ENCOUNTER — OFFICE VISIT (OUTPATIENT)
Dept: INTERNAL MEDICINE CLINIC | Age: 55
End: 2020-01-27
Payer: MEDICARE

## 2020-01-27 PROCEDURE — 99213 OFFICE O/P EST LOW 20 MIN: CPT | Performed by: PODIATRIST

## 2020-01-27 PROCEDURE — 29405 APPL SHORT LEG CAST: CPT

## 2020-01-27 RX ORDER — OMEPRAZOLE 20 MG/1
20 CAPSULE, DELAYED RELEASE ORAL EVERY MORNING
Qty: 30 CAPSULE | Refills: 2 | Status: SHIPPED | OUTPATIENT
Start: 2020-01-27 | End: 2020-05-04 | Stop reason: SDUPTHER

## 2020-01-27 NOTE — PROGRESS NOTES
OUTPATIENT SPECIALTY PODIATRY VISIT      Nursing Progress Note      January 27, 2020  Chao Marker    Patient description of the problem: wound check     Observations:cast intact     Ambulates:  assistance    Gait:      Assistive Devices: wheelchair    Fall History: No    Foot Hygiene: good    Foot Wear Proper: Yes    Impaired Skin Integrity: Yes      Pain Assessment:  Pain Present: no  Pain Score: 0/10  Pain Quality/Description:   Pain Onset:   ago  Pain Goal of patient: /10    Education Assessment:    Identify the learner who is being assessed for education:  patient                    Ability to Learn:  Exhibits ability to grasp concepts and respond to questions: Medium  Ready to Learn: No  calm   Preferred Method of Learning:  written  Barriers to Learning: Verbalizes interest  Special Considerations due to cultural, Bahai, spiritual beliefs:  No  Language:  English  :  No    Kiran Sethi Page  8:26 AM 1/27/2020

## 2020-01-27 NOTE — PROGRESS NOTES
Department of Podiatry  Resident Progress Note    Michi Cheung  Allergies: Sulfa antibiotics    SUBJECTIVE  The patient is a 62 y.o. female who presents to clinic for follow up for wound care S/P I&D and application of Stravix graft; Left foot (DOS 10/23/2019) that failed 2/2 patient noncompliance with her weightbearing status. Patient is a diabetic, and last blood sugar was checked this morning and was 112 mg/dL. She also admits her blood sugar was greater than 300 mg/dL over the weekend 2/2 her checking it after dinner. Patient states she called  for Toe filler to right foot, but  does not accept [employer]. Patient brought in the Regranex medication to be applied to the left foot open wound. She has been nonweightbearing as much as possible, but still walks on her Left foot. Patient denies N/V/F/C or other constitutional symptoms. Patient denies any other pedal complaints during this visit. Lab Results   Component Value Date    LABA1C 8.7 10/07/2019       ROS: A 10 point review of systems was conducted, significant findings as noted in HPI. All other systems negative.     Past Medical History:        Diagnosis Date    Amenorrhea     Anomalies of nails     Asthma 5/14/04    Athlete's foot 8/2010    Bacterial vaginosis 04/2008    Carpal tunnel syndrome 5/2007    [facility] [address]     Diabetes mellitus type II 08/2007    Diabetic neuropathy ([facility] [address] 8/10    DVT (deep venous thrombosis) ([facility] [address] 3/2004    Dyslipidemia 5/2009    Dyspareunia 05/2009    ETOH abuse 3/04/2007    Feet clawing     HTN (hypertension)     Hx of blood clots     Hyperlipidemia     MRSA (methicillin resistant staph aureus) culture positive 11/06/2017; 11/17/2017    foot; leg     Neuropathy 05/2009    polyneuropathy    Pain, back 04/2008    Pain, eye, right 5/14/04    Pancreatitis 5/14/04    Pseudocyst, pancreas 5/14/04    Scalp lesion 08/2007    Tinea pedis     Tobacco abuse 03/2008    Vaginal bleeding, abnormal 6/2007    Wears dentures        OBJECTIVE  Patient presents to the clinic accompanied by her daughter, ambulating with assistance of a wheelchair wearing dirty CAM boot right and TCC cast Left. Patient A&Ox3 and NAD. VASCULAR: DP and PT pulses are palpable b/l. CFT <3 sec of the remaining distal digits of the left foot and right TMA stump. Skin temperature is warm to luke warm from proximal to distal with no focal calor noted. Moderate nonpitting R>L.      NEUROLOGIC: Gross and epicritic sensation is diminished b/l. Protective sensation is diminished at all pedal sites b/l.     DERMATOLOGIC:  Dermatological changes noted B/L LE. Diffuse flaky, and xerotic tissue B/L. Left sub 1st metatarsal head region partial thickness ulcer with granular base and with slightly macerated borders and improving. Wound measures 0.3 cm x 0.3 cm x 0.1 cm. Wound does not probe to bone, no drainage, track or tunneling. Slight musky malodor noted to the left foot, and no signs of infection. Left foot webspaces 2-4 clean, dry, and intact skin. Picture taken 1/24/2020   Picture taken 1/27/2020       Right foot, intact integumentary. Patient gave verbal consent for the picture taken at today's visit. MUSCULOSKELETAL: Muscle strength is 5/5 for all pedal groups tested b/l. No pain to palpation the b/l LE. Transmetatarsal amputation noted to right foot. Left foot hallux amputation.       ASSESSMENT  S/P Left foot I&D with Stravix Graft application (DOS 50/42/80)  Partial thickness ulceration, Carpenter 1,  sub 1st metatarsal head left foot - improving  DM Type II uncontrolled with peripheral neuropathy  LE edema, B/L  History of Non-compliance  Personal History of Nicotine Dependence          PLAN  - Evaluation and treatment x30 minutes with >50% of the time discussing with patient the etiology and treatment of their condition.   - Sharp excisional debridement with a dermal curette

## 2020-02-03 ENCOUNTER — OFFICE VISIT (OUTPATIENT)
Dept: INTERNAL MEDICINE CLINIC | Age: 55
End: 2020-02-03
Payer: MEDICARE

## 2020-02-03 PROCEDURE — 29405 APPL SHORT LEG CAST: CPT

## 2020-02-03 PROCEDURE — 11000 DBRDMT ECZ/INFECTED SKIN<10%: CPT

## 2020-02-03 PROCEDURE — 99213 OFFICE O/P EST LOW 20 MIN: CPT | Performed by: PODIATRIST

## 2020-02-03 NOTE — PROGRESS NOTES
OUTPATIENT SPECIALTY PODIATRY VISIT      Nursing Progress Note      February 3, 2020  Kym Young    Patient description of the problem: wound check    Observations: cast intact    Ambulates:  assistance    Gait:      Assistive Devices: wheelchair    Fall History: No    Foot Hygiene: poor    Foot Wear Proper: Yes    Impaired Skin Integrity: Yes      Pain Assessment:  Pain Present: no  Pain Score: 0/10  Pain Quality/Description:   Pain Onset:   ago  Pain Goal of patient: /10    Education Assessment:    Identify the learner who is being assessed for education:                      Ability to Learn:  Exhibits ability to grasp concepts and respond to questions: Medium  Ready to Learn: No  calm   Preferred Method of Learning:  written  Barriers to Learning: Verbalizes interest  Special Considerations due to cultural, Synagogue, spiritual beliefs:  No  Language:  English  :  Lianna Hair Page  8:22 AM 2/3/2020

## 2020-02-03 NOTE — PROGRESS NOTES
Department of Podiatry  Resident Progress Note    Fany Elliott  Allergies: Sulfa antibiotics    SUBJECTIVE  The patient is a 62 y.o. female who presents to clinic for follow up for wound care S/P I&D and application of Stravix graft; Left foot (DOS 10/23/2019) that failed 2/2 patient noncompliance with her weightbearing status. Patient is a diabetic, and last blood sugar was checked this morning and was 210 mg/dL. She also admits her blood sugar was high and her daughter states it was 348 mg/dL over the weekend. Patient forgot to call [facility] for toe filler to her right foot, but her daughter called during today's visit and they are schedule appointment for March 17 and they do take her insurance. Patient forgot to bring in her Regranex medication for the open left foot wound. Patient states that she has been on her left foot more due to her 's diminishing health so she has to take care of him. Patient denies N/V/F/C/SOB or other constitutional symptoms. Lab Results   Component Value Date    LABA1C 8.7 10/07/2019       ROS: A 10 point review of systems was conducted, significant findings as noted in HPI. All other systems negative.     Past Medical History:        Diagnosis Date    Amenorrhea     Anomalies of nails     Asthma 5/14/04    Athlete's foot 8/2010    Bacterial vaginosis 04/2008    Carpal tunnel syndrome 5/2007    [address]chronic obstructive pulmonary disease) ([address]     Diabetes mellitus type II 08/2007    Diabetic neuropathy ([facility] [address] 8/10    DVT (deep venous thrombosis) ([facility] [address] 3/2004    Dyslipidemia 5/2009    Dyspareunia 05/2009    ETOH abuse 3/04/2007    Feet clawing     HTN (hypertension)     Hx of blood clots     Hyperlipidemia     MRSA (methicillin resistant staph aureus) culture positive 11/06/2017; 11/17/2017    foot; leg     Neuropathy 05/2009    polyneuropathy    Pain, back 04/2008    Pain, eye, right 5/14/04    Pancreatitis 5/14/04    Pseudocyst,

## 2020-02-10 ENCOUNTER — OFFICE VISIT (OUTPATIENT)
Dept: INTERNAL MEDICINE CLINIC | Age: 55
End: 2020-02-10
Payer: MEDICARE

## 2020-02-10 PROCEDURE — 99213 OFFICE O/P EST LOW 20 MIN: CPT | Performed by: PODIATRIST

## 2020-02-10 PROCEDURE — 11720 DEBRIDE NAIL 1-5: CPT

## 2020-02-10 PROCEDURE — 11000 DBRDMT ECZ/INFECTED SKIN<10%: CPT

## 2020-02-10 PROCEDURE — 29405 APPL SHORT LEG CAST: CPT

## 2020-02-10 NOTE — PROGRESS NOTES
Department of Podiatry  Resident Progress Note    Fei Canada  Allergies: Sulfa antibiotics    SUBJECTIVE  The patient is a 62 y.o. female who presents to clinic for follow up for wound care S/P I&D and application of Stravix graft; Left foot (DOS 10/23/2019) that failed 2/2 patient noncompliance with her weightbearing status. Patient states that she has been off her foot as much as possible but her  is in a nursing facility for his cancer. Patient denies any foot pain. Patient has kept her cast clean, dry, and intact. Patient was able to bring her Regranex in today. Patient is a diabetic, and last blood sugar was checked yesterday evening prior to dinner and was 226 mg/dL. She took her insulin and rechecked her blood sugar after dinner and it was 143 mg/dL. Patient states that her  is still in a nursing facility and is not sure if he will ever leave there. Patient is feeling very sad about that. Patient denies N/V/F/C/SOB or other constitutional symptoms. Patient has no other pedal complaints at this time. Lab Results   Component Value Date    LABA1C 8.7 10/07/2019       ROS: A 10 point review of systems was conducted, significant findings as noted in HPI. All other systems negative.     Past Medical History:        Diagnosis Date    Amenorrhea     Anomalies of nails     Asthma 5/14/04    Athlete's foot 8/2010    Bacterial vaginosis 04/2008    Carpal tunnel syndrome 5/2007    [facility]) [facility] [address]     Diabetes mellitus type II 08/2007    Diabetic neuropathy ([facility] [address] 8/10    DVT (deep venous thrombosis) ([facility] [address] 3/2004    Dyslipidemia 5/2009    Dyspareunia 05/2009    ETOH abuse 3/04/2007    Feet clawing     HTN (hypertension)     Hx of blood clots     Hyperlipidemia     MRSA (methicillin resistant staph aureus) culture positive 11/06/2017; 11/17/2017    foot; leg     Neuropathy 05/2009    polyneuropathy    Pain, back 04/2008    Pain, eye, right 5/14/04    Pancreatitis 5/14/04    Pseudocyst, pancreas 5/14/04    Scalp lesion 08/2007    Tinea pedis     Tobacco abuse 03/2008    Vaginal bleeding, abnormal 6/2007    Wears dentures        OBJECTIVE  Patient presents to the clinic accompanied by her daughter, ambulating with the assistance of a wheelchair wearing dog hair on her CAM boot right and dog hair on her TCC cast Left. Patient A&Ox3 and NAD. VASCULAR: DP and PT pulses are palpable b/l. CFT <3 sec of the remaining distal digits of the left foot and right dorsal flap TMA stump site. Skin temperature is warm to warm from proximal to distal with no focal calor noted. Minimal nonpitting edema noted right greater than left. NEUROLOGIC: Gross and epicritic sensation is diminished b/l. Protective sensation is diminished at all pedal sites b/l.         DERMATOLOGIC:  Dermatological changes noted B/L LE. Left foot nails 2 through 5 are thickened, discolored and elongated. Diffuse flaky, and xerotic tissue  L>R LE. Left sub 1st metatarsal head region partial thickness ulcer with granular base and hyperkeratotic borders and periwound. Wound measures 0.6x0. 2x0.2cm. Wound does not probe to bone, no drainage, track or tunneling. Mild to moderate musky malodor noted to the left foot 2/2 poor hygiene habits, and no signs of infection. Left foot webspaces 2-4 clean, dry, and intact skin. Patient gave verbal consent for the picture taken at today's visit. 2/10/2020     Right foot, intact integumentary. MUSCULOSKELETAL: Muscle strength is 5/5 for all pedal groups tested b/l. No pain to palpation the b/l LE. Transmetatarsal amputation noted to right foot. Left foot hallux amputation.       ASSESSMENT  S/P Left foot I&D with Stravix Graft application (DOS 58/78/60)  Partial thickness ulceration, Carpenter 1,  sub 1st metatarsal head left foot -improving  Onychomycosis x4  DM Type II uncontrolled with peripheral neuropathy  LE edema,

## 2020-02-10 NOTE — PROGRESS NOTES
OUTPATIENT SPECIALTY PODIATRY VISIT      Nursing Progress Note      February 10, 2020  Juanjose Candelario    Patient description of the problem: wound check    Observations: Cast intact    Ambulates:  assistance    Gait:      Assistive Devices: wheelchair    Fall History: No    Foot Hygiene: poor    Foot Wear Proper: Yes    Impaired Skin Integrity: Yes      Pain Assessment:  Pain Present: no  Pain Score: 0/10  Pain Quality/Description:   Pain Onset:   ago  Pain Goal of patient: /10    Education Assessment:    Identify the learner who is being assessed for education:  patient                    Ability to Learn:  Exhibits ability to grasp concepts and respond to questions: Medium  Ready to Learn: No  calm   Preferred Method of Learning:  written  Barriers to Learning: Verbalizes interest  Special Considerations due to cultural, Christian, spiritual beliefs:  No  Language:  English  :  No    CommentsErnesto Reynoso  8:27 AM 2/10/2020

## 2020-02-17 ENCOUNTER — OFFICE VISIT (OUTPATIENT)
Dept: INTERNAL MEDICINE CLINIC | Age: 55
End: 2020-02-17
Payer: MEDICARE

## 2020-02-17 PROCEDURE — 29405 APPL SHORT LEG CAST: CPT

## 2020-02-17 PROCEDURE — 11000 DBRDMT ECZ/INFECTED SKIN<10%: CPT

## 2020-02-17 PROCEDURE — 99213 OFFICE O/P EST LOW 20 MIN: CPT | Performed by: PODIATRIST

## 2020-02-17 NOTE — PROGRESS NOTES
OUTPATIENT SPECIALTY PODIATRY VISIT      Nursing Progress Note      February 17, 2020  Krzysztof Gutierrez    Patient description of the problem: wound check    Observations: castintact     Ambulates:  assistance    Gait:      Assistive Devices: wheelchair    Fall History: No    Foot Hygiene: good    Foot Wear Proper: Yes    Impaired Skin Integrity: Yes      Pain Assessment:  Pain Present: no  Pain Score: 0/10  Pain Quality/Description:   Pain Onset:   ago  Pain Goal of patient: /10    Education Assessment:    Identify the learner who is being assessed for education:  patient                    Ability to Learn:  Exhibits ability to grasp concepts and respond to questions: Medium  Ready to Learn: No  calm   Preferred Method of Learning:  written  Barriers to Learning: Verbalizes interest  Special Considerations due to cultural, Jewish, spiritual beliefs:  No  Language:  English  :  Lianna Wiggins Page  8:16 AM 2/17/2020

## 2020-02-17 NOTE — PROGRESS NOTES
Department of Podiatry  Resident Progress Note    Leopoldo Rancher  Allergies: Sulfa antibiotics    SUBJECTIVE  The patient is a 62 y.o. female who presents to clinic for follow up for wound care S/P I&D and application of Stravix graft; Left foot (DOS 10/23/2019) that failed 2/2 patient noncompliance with her weightbearing status. Patient is accompanied by her daughter that has been with her over the last several appointments. Patient states she had an okay weekend her  is still at a skilled nursing facility but she thinks that he is improving overall each day. Patient has has been very compliant with keeping her contact cast intact and dry. Patient has try to keep off her foot as much as possible but with her  being in a skilled nursing facility it is quite difficult as she explained to me. Patient is a diabetic, and last blood sugar was checked last night and it  was 270 mg/dL. Patient states she does not know why it is that elevated that she can sometimes run in the 300s. Patient has no left or right foot pain and feels overall happy with how the wound is getting smaller and smaller each visit. Patient denies N/V/F/C/SOB or other constitutional symptoms. Patient has no other pedal complaints during this evaluation. Lab Results   Component Value Date    LABA1C 8.7 10/07/2019       ROS: A 10 point review of systems was conducted, significant findings as noted in HPI. All other systems negative.     Past Medical History:        Diagnosis Date    Amenorrhea     Anomalies of nails     Asthma 5/14/04    Athlete's foot 8/2010    Bacterial vaginosis 04/2008    Carpal tunnel syndrome 5/2007    [facility] [address]     Diabetes mellitus type II 08/2007    Diabetic neuropathy ([facility] [address] 8/10    DVT (deep venous thrombosis) ([facility] [address] 3/2004    Dyslipidemia 5/2009    Dyspareunia 05/2009    ETOH abuse 3/04/2007    Feet clawing     HTN (hypertension)     Hx of 5/5 for all pedal groups tested b/l. No pain to palpation the b/l LE. Transmetatarsal amputation noted to right foot. Left foot hallux amputation. ASSESSMENT  S/P Left foot I&D with Stravix Graft application (DOS 39/05/49)  Partial thickness ulceration, Carpenter 1,  sub 1st metatarsal head left foot - improving  Onychomycosis x4  DM Type II uncontrolled with peripheral neuropathy  LE edema, B/L R>L  History of Non-compliance  Personal History of Nicotine Dependence          PLAN  - Evaluation and treatment x25 minutes with >50% of the time discussing with patient the etiology and treatment of their condition.   -Sharp excisional debridement with a dermal curette down to and including subcutaneous tissue. <20cm2 of tissue debrided. Hemostasis was achieved with direct pressure and estimated blood loss less than 5 mL. Patient tolerating the procedure well. -Applied DSD, Qwick dressing, Kerlix, and EZ-TCC to the left foot. -Applied tube D compression stocking and instructed patient to wear at all times during the day and can take off at night.  -Reiterated to patient to be strict nonweightbearing to the left foot as much as possible. Patient understands  - Patient to continue to wear her CAM boot to the right foot for ambulation and protection. Patient is able to transition back into normal shoe gear once she receives a toe filler from [facility]'s appointment on March 17, 2020.  -Instructed patient and had her call during evaluation to her primary care physician due to her increasing in elevation in her blood gulose. -Reiterated to patient to inspect her right foot daily, never walk barefoot and check blood sugar as often as instructed by primary care physician for a tight glycemic control.  -Reiterated to patient that she cannot drive while wearing her cam boot. Patient understands  -Return to the clinic in 1 week for Left foot cast removal and wound check.         Haley Ceballos DPM   Podiatric Resident, PGY-1  Pager: [phone]  2/17/2020, 8:03 AM

## 2020-02-21 RX ORDER — AMITRIPTYLINE HYDROCHLORIDE 100 MG/1
100 TABLET, FILM COATED ORAL NIGHTLY
Qty: 30 TABLET | Refills: 2 | Status: SHIPPED | OUTPATIENT
Start: 2020-02-21 | End: 2020-05-15

## 2020-02-21 RX ORDER — GABAPENTIN 400 MG/1
400 CAPSULE ORAL 3 TIMES DAILY
Qty: 90 CAPSULE | Refills: 2 | Status: SHIPPED | OUTPATIENT
Start: 2020-02-21 | End: 2020-04-14 | Stop reason: SDUPTHER

## 2020-02-24 ENCOUNTER — OFFICE VISIT (OUTPATIENT)
Dept: INTERNAL MEDICINE CLINIC | Age: 55
End: 2020-02-24
Payer: MEDICARE

## 2020-02-24 PROCEDURE — 99213 OFFICE O/P EST LOW 20 MIN: CPT | Performed by: PODIATRIST

## 2020-02-24 PROCEDURE — 29405 APPL SHORT LEG CAST: CPT

## 2020-02-24 NOTE — PROGRESS NOTES
hyperkeratotic tissue left foot sub-first metatarsal head with a #15 blade down to skin only. Less than 20 cm of tissue debrided. Tolerated procedure well. -Instructed patient to use over-the-counter lotion to all xerotic areas of the lower extremity and instructed not to put lotion between the webspaces. Patient understands. -Applied tubeGrip D compression stocking to the left lower extremity and instructed patient to wear at all times during the day and can take off at night. Patient understands.  -Instructed patient to elevate her lower extremity as much as possible to aid in edema control.  -Dispensed CAM boot to the left lower extremity and patient is strict nonweightbearing to the left foot as much as possible. Patient understands.  -Rx for left hallux toe filler.  -Instructed to patient to follow-up with her appointment with J[facility] on March 16, 2020  to the right lower extremity TMA toe filler and left hallux toe filler.  -Reiterated to patient to inspect her right foot daily, never walk barefoot and check blood sugar as often as instructed by primary care physician for a tight glycemic control.  -Reiterated to patient that she cannot drive while wearing her cam boot. Patient understands.  -Patient was very excited today due to the fact that her longstanding chronic ulcer to her left foot has finally healed. Patient was very grateful of the service that she received at the podiatry clinic so far. -Return to the clinic in 1 week for diabetic foot examination.         Abdiaziz Charles DPM   Podiatric Resident, PGY-1  Pager: [phone]  2/24/2020, 8:13 AM

## 2020-03-02 ENCOUNTER — OFFICE VISIT (OUTPATIENT)
Dept: INTERNAL MEDICINE CLINIC | Age: 55
End: 2020-03-02
Payer: MEDICARE

## 2020-03-02 PROCEDURE — 29405 APPL SHORT LEG CAST: CPT

## 2020-03-02 PROCEDURE — 99213 OFFICE O/P EST LOW 20 MIN: CPT | Performed by: PODIATRIST

## 2020-03-02 PROCEDURE — 11055 PARING/CUTG B9 HYPRKER LES 1: CPT

## 2020-03-02 NOTE — PROGRESS NOTES
Department of Podiatry  Resident Progress Note    Rafaela Nickel  Allergies: Sulfa antibiotics    SUBJECTIVE  The patient is a 62 y.o. female who presents to clinic for follow up for wound care S/P I&D and application of Stravix graft; Left foot (DOS 10/23/2019) that failed 2/2 patient noncompliance with her weightbearing status. Patient is accompanied by her daughter. Patient left sub-first ulceration was healed last visit, but has now reopened 2/2 nonweightbearing restriction to the left lower extremity and walked \"way more then usual\" as her daughter last week. Her said that she had a little drainage yesterday. Patient is a diabetic, and last blood sugar was checked last night and it  was 160 mg/dL. Patient denies N/V/F/C/SOB or other constitutional symptoms. Patient has no other pedal complaints patient. Lab Results   Component Value Date    LABA1C 8.7 10/07/2019       ROS: A 10 point review of systems was conducted, significant findings as noted in HPI. All other systems negative.     Past Medical History:        Diagnosis Date    Amenorrhea     Anomalies of nails     Asthma 5/14/04    Athlete's foot 8/2010    Bacterial vaginosis 04/2008    Carpal tunnel syndrome 5/2007    [facility] [address]     Diabetes mellitus type II 08/2007    Diabetic neuropathy ([facility] [address] 8/10    DVT (deep venous thrombosis) ([facility] [address] 3/2004    Dyslipidemia 5/2009    Dyspareunia 05/2009    ETOH abuse 3/04/2007    Feet clawing     HTN (hypertension)     Hx of blood clots     Hyperlipidemia     MRSA (methicillin resistant staph aureus) culture positive 11/06/2017; 11/17/2017    foot; leg     Neuropathy 05/2009    polyneuropathy    Pain, back 04/2008    Pain, eye, right 5/14/04    Pancreatitis 5/14/04    Pseudocyst, pancreas 5/14/04    Scalp lesion 08/2007    Tinea pedis     Tobacco abuse 03/2008    Vaginal bleeding, abnormal 6/2007    Wears dentures        OBJECTIVE  Patient

## 2020-03-09 ENCOUNTER — OFFICE VISIT (OUTPATIENT)
Dept: INTERNAL MEDICINE CLINIC | Age: 55
End: 2020-03-09
Payer: MEDICARE

## 2020-03-09 PROCEDURE — 11000 DBRDMT ECZ/INFECTED SKIN<10%: CPT

## 2020-03-09 PROCEDURE — 99213 OFFICE O/P EST LOW 20 MIN: CPT | Performed by: PODIATRIST

## 2020-03-09 NOTE — PROGRESS NOTES
OUTPATIENT SPECIALTY PODIATRY VISIT      Nursing Progress Note      March 9, 2020  Fany Elliott    Patient description of the problem: wound check    Observations: caast intact    Ambulates:  assistance    Gait:      Assistive Devices: wheelchair    Fall History: No    Foot Hygiene: good    Foot Wear Proper: Yes    Impaired Skin Integrity: yes     Pain Assessment:  Pain Present: no  Pain Score: 0/10  Pain Quality/Description:   Pain Onset:   ago  Pain Goal of patient: /10    Education Assessment:    Identify the learner who is being assessed for education:                      Ability to Learn:  Exhibits ability to grasp concepts and respond to questions: Medium  Ready to Learn: No  calm   Preferred Method of Learning:  written  Barriers to Learning: Verbalizes interest  Special Considerations due to cultural, Baptism, spiritual beliefs:  No  Language:  English  :  Lianna Ann Page  9:02 AM 3/9/2020

## 2020-03-09 NOTE — PROGRESS NOTES
OBJECTIVE  Patient presents to the clinic ambulating, with assistance and accompanied by her best friend wearing dirty cam boot right and TCC on the left. Patient A&Ox3 and NAD. VASCULAR: DP and PT pulses are palpable b/l. CFT brisk to remaining distal digits of the left foot and right dorsal flap TMA stump site. Skin temperature warm to warm from proximal to distal with no focal calor/rubor/dolor noted. NEUROLOGIC: Gross and epicritic sensation is diminished b/l. Protective sensation is diminished at all pedal sites b/l.       DERMATOLOGIC:  Dermatological changes noted B/L LE. Left foot nails 2-5 are normal length, discolored and thickend. Left foot webspaces 2-4 clean, dry, and intact skin. Diffuse flaky, and xerotic tissue  L>R LE. Left sub-first metatarsal head full-thickness wound with a mixture of granular/fibrotic with macerated odor and macerated periwound that  measures 1.7 x 2.4 x 0.2cm. Wound does not probe to bone, tunnels, or tracks. No drainage, no fluctuance, or crepitance noted. Diffuse, musky smell 2/2 poor hygiene habits. No periwound erythema. No signs of infection noted. Picture taken 3/2/2020   Picture taken 3/9/2020     Patient gave verbal consent for the picture taken. Right foot, intact integumentary. Diffuse xerotic skin noted. MUSCULOSKELETAL: Muscle strength is 5/5 for all pedal groups tested b/l. No pain to palpation the b/l LE. Transmetatarsal amputation noted to right foot. Left foot hallux amputation.       ASSESSMENT  Partial Thickness ulceration, Carpenter 1 Left sub 1st Metatarsal head  Onychomycosis x4  DM Type II uncontrolled with peripheral neuropathy  LE edema, B/L   History of Non-compliance  Personal History of Nicotine Dependence          PLAN  - Evaluation and treatment x30 minutes with >50% of the time discussing with patient the etiology and treatment of their condition.   -Sharp excisional debridement of macerated tissue left foot sub-first metatarsal head with dermal curette down to and including subcutaneous tissue. <25 cm² of tissue debrided. Estimated blood loss less than 5 cc of blood. Hemostasis was achieved with direct pressure.  -Left Lower extremity dressings consisted of Betadine soaked gauze to macerated tissue, DSD, and Ace Bandage.  -Instructed patient to do every other day dressing changes consisted of Betadine soaked gauze to macerated tissue, dry sterile dressing, and Ace bandage.  -Patient is strict nonweightbearing to the left lower extremity.  -Instructed patient to elevate her lower extremity as much as possible to aid in edema control.  -Reiterated to patient to follow-up appointment with [facility] on March 16, 2020  to be measured for a TMA toe filler for the right.  -Reiterated to the patient about her nonweightbearing status and that the new ulceration 2/2 being noncompliant with her weightbearing status. It if she continues to be noncompliant with weightbearing status that further breakdown of ulcer could result in further complications and possible surgical intervention.  -Return to the clinic in 1 week for diabetic foot examination.         Rafi Fuentes DPM   Podiatric Resident, PGY-1  Pager: [phone]  3/9/2020, 8:55 AM

## 2020-03-10 RX ORDER — METOPROLOL SUCCINATE 50 MG/1
50 TABLET, EXTENDED RELEASE ORAL NIGHTLY
Qty: 30 TABLET | Refills: 1 | Status: SHIPPED | OUTPATIENT
Start: 2020-03-10 | End: 2020-04-14 | Stop reason: SDUPTHER

## 2020-03-16 ENCOUNTER — OFFICE VISIT (OUTPATIENT)
Dept: INTERNAL MEDICINE CLINIC | Age: 55
End: 2020-03-16
Payer: MEDICARE

## 2020-03-16 PROCEDURE — 99213 OFFICE O/P EST LOW 20 MIN: CPT | Performed by: PODIATRIST

## 2020-03-16 PROCEDURE — 11000 DBRDMT ECZ/INFECTED SKIN<10%: CPT

## 2020-03-20 RX ORDER — ESCITALOPRAM OXALATE 10 MG/1
10 TABLET ORAL DAILY
Qty: 30 TABLET | Refills: 1 | Status: CANCELLED | OUTPATIENT
Start: 2020-03-20

## 2020-03-23 ENCOUNTER — OFFICE VISIT (OUTPATIENT)
Dept: INTERNAL MEDICINE CLINIC | Age: 55
End: 2020-03-23
Payer: MEDICARE

## 2020-03-23 PROCEDURE — 11000 DBRDMT ECZ/INFECTED SKIN<10%: CPT

## 2020-03-23 PROCEDURE — 99213 OFFICE O/P EST LOW 20 MIN: CPT | Performed by: PODIATRIST

## 2020-03-23 RX ORDER — ESCITALOPRAM OXALATE 10 MG/1
10 TABLET ORAL DAILY
Qty: 30 TABLET | Refills: 1 | Status: SHIPPED | OUTPATIENT
Start: 2020-03-23 | End: 2020-05-26

## 2020-03-23 RX ORDER — AMOXICILLIN AND CLAVULANATE POTASSIUM 875; 125 MG/1; MG/1
1 TABLET, FILM COATED ORAL 2 TIMES DAILY
Qty: 14 TABLET | Refills: 0 | Status: SHIPPED | OUTPATIENT
Start: 2020-03-23 | End: 2020-03-30

## 2020-03-23 RX ORDER — LANCETS
1 EACH MISCELLANEOUS 2 TIMES DAILY
Qty: 200 EACH | Refills: 5 | Status: SHIPPED | OUTPATIENT
Start: 2020-03-23 | End: 2020-09-18 | Stop reason: SDUPTHER

## 2020-03-23 NOTE — PATIENT INSTRUCTIONS
Return in 1 week. Every other day clean wound with betadine  And cover with dry dressing. Non weight bearing. Monday go to [facility] [address]

## 2020-03-23 NOTE — PROGRESS NOTES
Department of Podiatry  Resident Progress Note    Lucretiasuraj Tan  Allergies: Sulfa antibiotics    SUBJECTIVE  The patient is a 62 y.o. female who presents to clinic for follow up for wound care. Patient states that she has been walking around and noticed a new blister the \"other day\". Patient states that she did not have betadine at home to perform dressing changes therefore she has been using Regranex instead. Patient states that she did go to her [facility] appointment and is expecting her insert with toe filler to be delivered in the next couple of weeks. She states that she has had some pain in her left foot at the level of the wound, is not radiating. Of note, patient is a Type II Diabetic and states that her blood sugar has been around 120-130s. Patient denies nausea, vomiting, fever, chills, shortness of breath, chest pain, or calf pain. No other pedal complaints at this visit. Lab Results   Component Value Date    LABA1C 8.7 10/07/2019       ROS: A 10 point review of systems was conducted, significant findings as noted in HPI. All other systems negative.     Past Medical History:        Diagnosis Date    Amenorrhea     Anomalies of nails     Asthma 5/14/04    Athlete's foot 8/2010    Bacterial vaginosis 04/2008    Carpal tunnel syndrome 5/2007    COPD [facility]chronic obstructive pulmonary disease) [facility] [address]     Diabetes mellitus type II 08/2007    Diabetic neuropathy ([facility] [address] 8/10    DVT (deep venous thrombosis) ([facility] [address] 3/2004    Dyslipidemia 5/2009    Dyspareunia 05/2009    ETOH abuse 3/04/2007    Feet clawing     HTN (hypertension)     Hx of blood clots     Hyperlipidemia     MRSA (methicillin resistant staph aureus) culture positive 11/06/2017; 11/17/2017    foot; leg     Neuropathy 05/2009    polyneuropathy    Pain, back 04/2008    Pain, eye, right 5/14/04    Pancreatitis 5/14/04    Pseudocyst, pancreas 5/14/04    Scalp lesion 08/2007    Tinea pedis     Tobacco abuse 03/2008    Vaginal bleeding, abnormal 6/2007    Wears dentures        OBJECTIVE  Patient presents to the clinic with assistance via a wheelchair and accompanied by her daughter - wearing a soiled CAM boot to the right and an ACE bandage and post-op shoe on the left. Patient is awake, alert, and oriented without any acute signs of distress. VASCULAR: DP and PT pulses are palpable 2/4 b/l. CFT brisk to remaining distal digits of the left foot and right distal TMA stump site. Skin temperature cool to warm from proximal to distal with no focal calor or rubor noted on the right and warm to warm on the left. Increased warmth noted to the left forefoot, specifically to the forefoot. Diffuse non-pitting edema noted to b/l LE.    NEUROLOGIC: Gross and epicritic sensation is diminished b/l. Protective sensation is absent at all pedal sites b/l.     DERMATOLOGIC:  Diffuse dermatological changes noted to b/l LE. Left foot toenails 2-5 are within normal length, discolored yellow and thickend. Left foot webspaces 2-4 are clean, dry, and intact skin. Diffuse flaky and xerotic tissue noted to b/l LE. Left foot plantar first metatarsal head full-thickness wound with a mixture of granular and fibrotic base with macerated  and hyperkeratotic periwound borders that measures approximately 2 x 1.0 x 0.3cm. Wound does not probe to bone, tunnel, or track. No purulent drainage, no fluctuance, or crepitance noted. Diffuse musky smell 2/2 poor hygiene habits. Serous blister noted to the lateral distal aspect of the ulceration with serous drainage. Erythema noted to the left forefoot, specifically at the level of the 1st metatarsal head. Right foot intact integumentary, no hyperkeratosis or ulcerations noted. Diffuse xerotic skin noted. MUSCULOSKELETAL: Muscle strength is 5/5 for all pedal groups tested b/l. No pain to palpation the b/l LE. Transmetatarsal amputation noted to right foot. Left foot hallux amputation. ASSESSMENT  1.  Full thickness Patient and daughter understood and voiced they would work on improving.  -Rx Augmentin 7 days provided to patient, instructed to take as prescribed to completion.  -Return to the clinic in 1 week for wound follow-up.     Danica Markham DPM  Podiatric Resident PGY1  [phone]  3/23/2020, 9:35 AM

## 2020-03-25 PROBLEM — B35.3 TINEA PEDIS: Status: RESOLVED | Noted: 2020-03-25 | Resolved: 2020-03-24

## 2020-03-25 PROBLEM — E11.628 DIABETIC FOOT INFECTION (HCC): Status: RESOLVED | Noted: 2018-03-27 | Resolved: 2020-03-24

## 2020-03-25 PROBLEM — L08.9 DIABETIC FOOT INFECTION (HCC): Status: RESOLVED | Noted: 2018-03-27 | Resolved: 2020-03-24

## 2020-03-30 ENCOUNTER — HOSPITAL ENCOUNTER (OUTPATIENT)
Age: 55
Discharge: HOME | End: 2020-03-30
Payer: MEDICARE

## 2020-03-30 ENCOUNTER — OFFICE VISIT (OUTPATIENT)
Dept: INTERNAL MEDICINE CLINIC | Age: 55
End: 2020-03-30
Payer: MEDICARE

## 2020-03-30 ENCOUNTER — HOSPITAL ENCOUNTER (OUTPATIENT)
Dept: GENERAL RADIOLOGY | Age: 55
Discharge: HOME | End: 2020-03-30
Payer: MEDICARE

## 2020-03-30 PROCEDURE — 11000 DBRDMT ECZ/INFECTED SKIN<10%: CPT

## 2020-03-30 PROCEDURE — 73630 X-RAY EXAM OF FOOT: CPT

## 2020-03-30 PROCEDURE — 99213 OFFICE O/P EST LOW 20 MIN: CPT | Performed by: PODIATRIST

## 2020-03-30 NOTE — PATIENT INSTRUCTIONS
Change dressing every other day with betadine on gauze. Then cover with dry dressing  And wrap with kerlix and ace wrap. Return in 1 week. Get xray.

## 2020-03-30 NOTE — PROGRESS NOTES
04/2008    Pain, eye, right 5/14/04    Pancreatitis 5/14/04    Pseudocyst, pancreas 5/14/04    Scalp lesion 08/2007    Tinea pedis     Tobacco abuse 03/2008    Vaginal bleeding, abnormal 6/2007    Wears dentures        OBJECTIVE  Patient presents to the clinic ambulating, with assistance and unaccompanied wearing dirty cam boot B/L and Bangel's pajamas with green Carhart jacket with dog hair. Patient A&Ox3 and NAD. VASCULAR: DP and PT pulses are palpable b/l. CFT brisk to remaining distal digits of the left foot and right dorsal flap TMA stump site. Skin temperature warm to cool from proximal to distal with no focal calor or rubor noted. Diffuse nonpitting edema noted B/L and slightly improving 2/2 Tubigrip compression stockings. NEUROLOGIC: Gross and epicritic sensation is diminished b/l. Protective sensation is diminished at all pedal sites b/l.       DERMATOLOGIC:  Dermatological changes noted B/L LE. Left foot nails 2-5 are normal length, discolored and thickend. Webspaces 2-4 C/D/I. Diffuse flaky, and xerotic tissue  L>R LE. Left sub-first metatarsal head full-thickness wound with a mixture of granular, fibrotic with macerated borders and hyperkeratotic periwound that  measures 1.5 x 1.6 x 0.2cm. Wound does not probe to bone, tunnels, or tracks. No drainage, no fluctuance, or crepitance noted. Diffuse, musky smell 2/2 poor hygiene habits. No periwound erythema. No signs of infection noted. Patient gave verbal consent for the picture taken at today's visit. Right foot, intact integumentary. Diffuse xerotic skin noted. Patient gave verbal consent for the picture taken at today's visit. MUSCULOSKELETAL: Muscle strength is 5/5 for all pedal groups tested b/l. No pain to palpation the b/l LE. Transmetatarsal amputation noted to right foot. Left foot hallux amputation.       ASSESSMENT  Partial Thickness ulceration, Carpenter 1 Left sub 1st Metatarsal

## 2020-04-06 ENCOUNTER — OFFICE VISIT (OUTPATIENT)
Dept: INTERNAL MEDICINE CLINIC | Age: 55
End: 2020-04-06
Payer: MEDICARE

## 2020-04-06 PROCEDURE — 29405 APPL SHORT LEG CAST: CPT

## 2020-04-06 PROCEDURE — 99213 OFFICE O/P EST LOW 20 MIN: CPT | Performed by: PODIATRIST

## 2020-04-06 PROCEDURE — 11000 DBRDMT ECZ/INFECTED SKIN<10%: CPT

## 2020-04-07 ENCOUNTER — TELEPHONE (OUTPATIENT)
Dept: INTERNAL MEDICINE CLINIC | Age: 55
End: 2020-04-07

## 2020-04-10 RX ORDER — GABAPENTIN 400 MG/1
400 CAPSULE ORAL 3 TIMES DAILY
Qty: 90 CAPSULE | Refills: 2 | Status: CANCELLED | OUTPATIENT
Start: 2020-04-10 | End: 2020-05-10

## 2020-04-14 ENCOUNTER — OFFICE VISIT (OUTPATIENT)
Dept: INTERNAL MEDICINE CLINIC | Age: 55
End: 2020-04-14
Payer: MEDICARE

## 2020-04-14 VITALS
HEART RATE: 64 BPM | BODY MASS INDEX: 37.87 KG/M2 | TEMPERATURE: 96.9 F | WEIGHT: 205.8 LBS | DIASTOLIC BLOOD PRESSURE: 70 MMHG | RESPIRATION RATE: 16 BRPM | OXYGEN SATURATION: 95 % | HEIGHT: 62 IN | SYSTOLIC BLOOD PRESSURE: 138 MMHG

## 2020-04-14 DIAGNOSIS — E11.22 TYPE 2 DIABETES MELLITUS WITH STAGE 3 CHRONIC KIDNEY DISEASE, UNSPECIFIED WHETHER LONG TERM INSULIN USE: ICD-10-CM

## 2020-04-14 DIAGNOSIS — N18.3 TYPE 2 DIABETES MELLITUS WITH STAGE 3 CHRONIC KIDNEY DISEASE, UNSPECIFIED WHETHER LONG TERM INSULIN USE: ICD-10-CM

## 2020-04-14 LAB
ALBUMIN SERPL-MCNC: 3.5 G/DL (ref 3.4–5)
ANION GAP SERPL CALCULATED.3IONS-SCNC: 10 MMOL/L (ref 3–16)
BUN BLDV-MCNC: 26 MG/DL (ref 7–20)
CALCIUM SERPL-MCNC: 9.3 MG/DL (ref 8.3–10.6)
CHLORIDE BLD-SCNC: 103 MMOL/L (ref 99–110)
CO2: 26 MMOL/L (ref 21–32)
CREAT SERPL-MCNC: 1.8 MG/DL (ref 0.6–1.1)
CREATININE URINE: 132.7 MG/DL (ref 28–259)
GFR AFRICAN AMERICAN: 35
GFR NON-AFRICAN AMERICAN: 29
GLUCOSE BLD-MCNC: 145 MG/DL (ref 70–99)
MICROALBUMIN UR-MCNC: 551.4 MG/DL
MICROALBUMIN/CREAT UR-RTO: 4155.2 MG/G (ref 0–30)
PHOSPHORUS: 3.6 MG/DL (ref 2.5–4.9)
POTASSIUM SERPL-SCNC: 5.1 MMOL/L (ref 3.5–5.1)
SODIUM BLD-SCNC: 139 MMOL/L (ref 136–145)

## 2020-04-14 PROCEDURE — 99213 OFFICE O/P EST LOW 20 MIN: CPT | Performed by: SURGERY

## 2020-04-14 RX ORDER — METFORMIN HYDROCHLORIDE 500 MG/1
500 TABLET, EXTENDED RELEASE ORAL 2 TIMES DAILY
Qty: 90 TABLET | Refills: 3 | Status: SHIPPED | OUTPATIENT
Start: 2020-04-14 | End: 2020-05-28

## 2020-04-14 RX ORDER — TRAZODONE HYDROCHLORIDE 50 MG/1
50 TABLET ORAL NIGHTLY PRN
Qty: 30 TABLET | Refills: 0 | Status: CANCELLED | OUTPATIENT
Start: 2020-04-14

## 2020-04-14 RX ORDER — APIXABAN 5 MG/1
TABLET, FILM COATED ORAL
Qty: 60 TABLET | Refills: 2 | Status: SHIPPED | OUTPATIENT
Start: 2020-04-14 | End: 2020-07-08 | Stop reason: SDUPTHER

## 2020-04-14 RX ORDER — AMITRIPTYLINE HYDROCHLORIDE 100 MG/1
100 TABLET, FILM COATED ORAL NIGHTLY
Qty: 30 TABLET | Refills: 2 | Status: CANCELLED | OUTPATIENT
Start: 2020-04-14

## 2020-04-14 RX ORDER — NYSTATIN 100000 [USP'U]/G
POWDER TOPICAL 2 TIMES DAILY
Qty: 30 G | Refills: 3 | Status: SHIPPED | OUTPATIENT
Start: 2020-04-14 | End: 2020-09-18 | Stop reason: SDUPTHER

## 2020-04-14 RX ORDER — GABAPENTIN 400 MG/1
400 CAPSULE ORAL 3 TIMES DAILY
Qty: 90 CAPSULE | Refills: 2 | Status: SHIPPED | OUTPATIENT
Start: 2020-04-14 | End: 2020-07-07 | Stop reason: SDUPTHER

## 2020-04-14 RX ORDER — CALCIUM CITRATE/VITAMIN D3 200MG-6.25
1 TABLET ORAL 2 TIMES DAILY
Qty: 200 EACH | Refills: 5 | Status: SHIPPED | OUTPATIENT
Start: 2020-04-14 | End: 2020-09-18 | Stop reason: SDUPTHER

## 2020-04-14 RX ORDER — ATORVASTATIN CALCIUM 20 MG/1
20 TABLET, FILM COATED ORAL NIGHTLY
Qty: 30 TABLET | Refills: 1 | Status: SHIPPED | OUTPATIENT
Start: 2020-04-14 | End: 2020-09-18 | Stop reason: SDUPTHER

## 2020-04-14 RX ORDER — METOPROLOL SUCCINATE 50 MG/1
50 TABLET, EXTENDED RELEASE ORAL NIGHTLY
Qty: 30 TABLET | Refills: 1 | Status: SHIPPED | OUTPATIENT
Start: 2020-04-14 | End: 2020-05-12

## 2020-04-14 RX ORDER — INSULIN GLARGINE 100 [IU]/ML
50 INJECTION, SOLUTION SUBCUTANEOUS DAILY
Qty: 5 PEN | Refills: 5 | Status: SHIPPED | OUTPATIENT
Start: 2020-04-14 | End: 2020-09-18 | Stop reason: SDUPTHER

## 2020-04-14 RX ORDER — ALBUTEROL SULFATE 90 UG/1
2 AEROSOL, METERED RESPIRATORY (INHALATION) EVERY 6 HOURS PRN
Qty: 1 INHALER | Refills: 5 | Status: SHIPPED | OUTPATIENT
Start: 2020-04-14 | End: 2021-01-01 | Stop reason: CLARIF

## 2020-04-14 NOTE — PROGRESS NOTES
MD   metoprolol succinate (TOPROL XL) 50 MG extended release tablet Take 1 tablet by mouth nightly  Dulce Maria Fontaine MD   gabapentin (NEURONTIN) 400 MG capsule Take 1 capsule by mouth 3 times daily for 30 days. Steffen Shook MD   amitriptyline (ELAVIL) 100 MG tablet Take 1 tablet by mouth nightly  Steffen Shook MD   omeprazole (PRILOSEC) 20 MG delayed release capsule Take 1 capsule by mouth every morning  Cas Perez MD   furosemide (LASIX) 40 MG tablet Take 1 tablet by mouth 2 times daily  Kody Osei MD   blood glucose test strips (TRUE METRIX BLOOD GLUCOSE TEST) strip 1 each by In Vitro route 2 times daily As needed. Liz Villegas MD   albuterol (PROVENTIL) (5 MG/ML) 0.5% nebulizer solution Take 1 mL by nebulization 4 times daily as needed for Wheezing  Liz Villegas MD   insulin glargine (LANTUS) 100 UNIT/ML injection pen Inject 50 Units into the skin daily  Liz Villegas MD   ammonium lactate (LAC-HYDRIN) 12 % lotion Apply topically daily. Hudson Horta DPM   aspirin EC 81 MG EC tablet Take 1 tablet by mouth daily  Dulce Maria Fontaine MD   metFORMIN (GLUCOPHAGE-XR) 500 MG extended release tablet Take 1 tablet by mouth 2 times daily  Dulce Maria Fontaine MD   METHADONE HCL PO Take 140 mg by mouth Take whole bottle every AM  Comes from methadone clinic  Historical Provider, MD   ELIQUIS 5 MG TABS tablet TK 1 T PO  BID  Historical Provider, MD   atorvastatin (LIPITOR) 20 MG tablet Take 1 tablet by mouth nightly  Liz Villegas MD   albuterol sulfate  (90 Base) MCG/ACT inhaler Inhale 2 puffs into the lungs every 6 hours as needed for Wheezing  Lzi Villegas MD   nystatin (MYCOSTATIN) 668386 UNIT/GM powder Apply topically 2 times daily Apply to right breast and groin area BID  Historical Provider, MD   ammonium lactate (LAC-HYDRIN) 12 % lotion Apply topically daily.   Yue Chavez DPM   Lancets MISC 1 each by Does not apply route 2 times daily discussed:    Significance of positive screen - False-positive LDCT results often occur. 95% of all positive results do not lead to a diagnosis of cancer. Usually further imaging can resolve most false-positive results; however, some patients may require invasive procedures. Over diagnosis risk - 10% to 12% of screen-detected lung cancer cases are over diagnosed--that is, the cancer would not have been detected in the patient's lifetime without the screening. Need for follow up screens annually to continue lung cancer screening effectiveness     Risks associated with radiation from annual LDCT- Radiation exposure is about the same as for a mammogram, which is about 1/3 of the annual background radiation exposure from everyday life. Starting screening at age 54 is not likely to increase cancer risk from radiation exposure. Patients with comorbidities resulting in life expectancy of < 10 years, or that would preclude treatment of an abnormality identified on CT, should not be screened due to lack of benefit.     To obtain maximal benefit from this screening, smoking cessation and long-term abstinence from smoking is critical

## 2020-04-20 NOTE — PROGRESS NOTES
Department of Podiatry  Resident Progress Note    Brad Mahoney  Allergies: Sulfa antibiotics    SUBJECTIVE  The patient is a 62 y.o. female who presents to clinic for follow up for wound care. Patient is accompanied by her daughter patient wound was healed 3 weeks ago, but she walked on it way more than usual and left foot re-ulcerated. Patient states that she has been doing her every other day dressing changes consisting of Betadine soaked gauze, DSD, and Ace bandage to the left lower extremity. Patient states that she has tried to keep off her feet as much as possible. She also states that she is going to [facility] today at 1pm to get her toe filler to her right lower extremity. Patient is diabetic and her last blood sugar was checked yesterday evening and it was 112 mg/dL. She states that she has been checking her blood sugar more frequently. Patient denies N/V/F/C/SOB or other constitutional symptoms. Patient has no other pedal complaints during today's evaluation. Lab Results   Component Value Date    LABA1C 8.7 10/07/2019       ROS: A 10 point review of systems was conducted, significant findings as noted in HPI. All other systems negative.     Past Medical History:        Diagnosis Date    Amenorrhea     Anomalies of nails     Asthma 5/14/04    Athlete's foot 8/2010    Bacterial vaginosis 04/2008    Carpal tunnel syndrome 5/2007    [facility] [address]     Diabetes mellitus type II 08/2007    Diabetic neuropathy ([facility] [address] 8/10    DVT (deep venous thrombosis) ([facility] [address] 3/2004    Dyslipidemia 5/2009    Dyspareunia 05/2009    ETOH abuse 3/04/2007    Feet clawing     HTN (hypertension)     Hx of blood clots     Hyperlipidemia     MRSA (methicillin resistant staph aureus) culture positive 11/06/2017; 11/17/2017    foot; leg     Neuropathy 05/2009    polyneuropathy    Pain, back 04/2008    Pain, eye, right 5/14/04    Pancreatitis 5/14/04    Pseudocyst,
11/17/2017    foot; leg     Neuropathy 05/2009    polyneuropathy    Pain, back 04/2008    Pain, eye, right 5/14/04    Pancreatitis 5/14/04    Pseudocyst, pancreas 5/14/04    Scalp lesion 08/2007    Tinea pedis     Tobacco abuse 03/2008    Vaginal bleeding, abnormal 6/2007    Wears dentures        OBJECTIVE  Patient presents to the clinic ambulating with assistance from a wheelchair and accompanied by her daughter wearing facemask and cam boots to B/L feet with dog and cat hair. Patient A&Ox3 and NAD. VASCULAR: DP and PT pulses are palpable b/l. CFT brisk to remaining distal digits of the left foot and right dorsal flap TMA stump site. Skin temperature warm to lukewarm from proximal to distal with no focal calor, rubor, dolor noted. NEUROLOGIC: Gross and epicritic sensation is diminished b/l. Protective sensation is diminished at all pedal sites b/l. DERMATOLOGIC:  Dermatological changes noted B/L LE. Left foot nails 2-5 are normal length, discolored, and thickend. Left foot webspaces 2-4 clean, dry, and intact with flaky xerotic tissue. Diffuse flaky, and xerotic tissue  L>R LE. Left sub-first metatarsal head full-thickness wound with a mixture of pink granular and fibrotic and slightly decreasing in size. Wound does not probe to bone, tunnels, or tracks. No drainage, no fluctuance, malodor, or crepitus noted. No signs of infection noted. Right foot, intact integumentary. Diffuse xerotic skin noted. MUSCULOSKELETAL: Muscle strength is 5/5 for all pedal groups tested b/l. No pain to palpation the b/l LE. Transmetatarsal amputation noted to right foot. Left foot hallux amputation.       ASSESSMENT  Full Thickness ulceration, Carpenter 1 Left sub 1st Metatarsal head  Onychomycosis x4  DM Type II uncontrolled with peripheral neuropathy  LE edema, B/L   History of Non-compliance  Personal History of Nicotine Dependence    PLAN  - Evaluation and treatment x25 minutes with >50% of the time

## 2020-04-21 RX ORDER — FUROSEMIDE 40 MG/1
40 TABLET ORAL DAILY
Qty: 30 TABLET | Refills: 2 | Status: SHIPPED | OUTPATIENT
Start: 2020-04-21 | End: 2020-09-18 | Stop reason: SDUPTHER

## 2020-04-21 RX ORDER — FUROSEMIDE 40 MG/1
40 TABLET ORAL DAILY
Qty: 90 TABLET | OUTPATIENT
Start: 2020-04-21

## 2020-04-28 ENCOUNTER — CLINICAL DOCUMENTATION (OUTPATIENT)
Dept: PODIATRY | Age: 55
End: 2020-04-28

## 2020-04-28 ENCOUNTER — TELEPHONE (OUTPATIENT)
Dept: INTERNAL MEDICINE CLINIC | Age: 55
End: 2020-04-28

## 2020-04-28 NOTE — PROGRESS NOTES
metatarsal head - Carpenter II  2. Onychomycosis 2-4, left  3. Diabetes Mellitus Type II uncontrolled with peripheral neuropathy  4. History of non-compliance with weightbearing status and dressing changes  5. Personal History of Nicotine Dependence       PLAN  -Evaluation and treatment x30 minutes with >50% of the time discussing with patient the etiology and treatment of her condition.   -Sharp excisional debridement of macerated and fibrotic tissue left foot sub-first metatarsal head with dermal curette down to and including subcutaneous tissue. <25 cm² of tissue debrided. Estimated blood loss <5 cc of blood. Hemostasis was achieved with direct pressure. Patient tolerated the procedure well.  -Left Lower extremity dressings consisting of: Modesta to the wound bed, Betadine painted to the periwound borders, DSD, and ACE Bandage.  -Instructed patient to perform every other day dressing changes consisted of Modesta to wound bed, betadine to macerated tissue, gauze, kerlix, and ACE bandage. Patient voiced understanding.  -Patient is continued strict NON-weightbearing to the left lower extremity.  -Reiterated to patient to elevate her lower extremities as much as possible to aid in edema control.  -Had a long discussion with patient about being non-compliant with weightbearing status and that any amount of pressure will prolong if not increase wound. Gave realistic expectations that wound continues to breakdown and increased in size could include but not limited to further surgical intervention or increased risk of infection to the left lower extremity.   Patient understood  -Return to Dr. Macario Joshi private clinic location in 1 week for diabetic foot examination and wound care.     Elisabet Salinas DPM  Podiatric Resident PGY1  4/28/2020, 11:33 AM

## 2020-04-29 ENCOUNTER — TELEPHONE (OUTPATIENT)
Dept: INTERNAL MEDICINE CLINIC | Age: 55
End: 2020-04-29

## 2020-05-04 ENCOUNTER — OFFICE VISIT (OUTPATIENT)
Dept: INTERNAL MEDICINE CLINIC | Age: 55
End: 2020-05-04
Payer: MEDICARE

## 2020-05-04 ENCOUNTER — CLINICAL DOCUMENTATION (OUTPATIENT)
Dept: PODIATRY | Age: 55
End: 2020-05-04

## 2020-05-04 VITALS
RESPIRATION RATE: 16 BRPM | HEART RATE: 83 BPM | DIASTOLIC BLOOD PRESSURE: 90 MMHG | SYSTOLIC BLOOD PRESSURE: 192 MMHG | TEMPERATURE: 97.8 F | OXYGEN SATURATION: 97 % | WEIGHT: 212 LBS | BODY MASS INDEX: 38.78 KG/M2

## 2020-05-04 PROCEDURE — 99213 OFFICE O/P EST LOW 20 MIN: CPT | Performed by: STUDENT IN AN ORGANIZED HEALTH CARE EDUCATION/TRAINING PROGRAM

## 2020-05-04 RX ORDER — OMEPRAZOLE 20 MG/1
20 CAPSULE, DELAYED RELEASE ORAL EVERY MORNING
Qty: 30 CAPSULE | Refills: 2 | Status: SHIPPED | OUTPATIENT
Start: 2020-05-04 | End: 2020-09-18 | Stop reason: SDUPTHER

## 2020-05-04 RX ORDER — LINEZOLID 600 MG/1
600 TABLET, FILM COATED ORAL 2 TIMES DAILY
Qty: 20 TABLET | Refills: 0 | Status: SHIPPED | OUTPATIENT
Start: 2020-05-04 | End: 2020-05-14

## 2020-05-04 RX ORDER — CIPROFLOXACIN 500 MG/1
500 TABLET, FILM COATED ORAL 2 TIMES DAILY
Qty: 20 TABLET | Refills: 0 | Status: SHIPPED | OUTPATIENT
Start: 2020-05-04 | End: 2020-05-14

## 2020-05-04 NOTE — PROGRESS NOTES
states fasting glucose at home this am was 68, she took 3 units of Humalog and took 50 units of Basaglar and did not eat, she went to Podiatry appointment; feeling lightheaded now and eating peanut butter crackers. daughter with patient.
BENIGN SKIN LESION EXCISION  7003    cryotherapy done on lesion    TOE AMPUTATION Left 2017    AMPUTATION LEFT GREAT TOE                    Social History     Socioeconomic History    Marital status:      Spouse name: Not on file    Number of children: Not on file    Years of education: Not on file    Highest education level: Not on file   Occupational History    Occupation: NA   Social Needs    Financial resource strain: Not on file    Food insecurity     Worry: Not on file     Inability: Not on file    Transportation needs     Medical: Not on file     Non-medical: Not on file   Tobacco Use    Smoking status: Former Smoker     Packs/day: 1.00     Years: 30.00     Pack years: 30.00     Types: Cigarettes     Last attempt to quit: 2018     Years since quittin.9    Smokeless tobacco: Never Used    Tobacco comment: 1-2 cig/day   Substance and Sexual Activity    Alcohol use: No     Alcohol/week: 4.0 - 5.0 standard drinks     Types: 4 - 5 Standard drinks or equivalent per week     Comment: hx of etoh abuse, denies recent etoh use    Drug use: No     Comment: Methodone Maintance    Sexual activity: Not on file   Lifestyle    Physical activity     Days per week: Not on file     Minutes per session: Not on file    Stress: Not on file   Relationships    Social connections     Talks on phone: Not on file     Gets together: Not on file     Attends [employer] service: Not on file     Active member of club or organization: Not on file     Attends meetings of clubs or organizations: Not on file     Relationship status: Not on file    Intimate partner violence     Fear of current or ex partner: Not on file     Emotionally abused: Not on file     Physically abused: Not on file     Forced sexual activity: Not on file   Other Topics Concern    Not on file   Social History Narrative    Not on file        No family history on file.     Vitals:    20 1028   BP: (!) 190/90   Site: Right

## 2020-05-06 ENCOUNTER — TELEPHONE (OUTPATIENT)
Dept: INTERNAL MEDICINE CLINIC | Age: 55
End: 2020-05-06

## 2020-05-12 ENCOUNTER — TELEPHONE (OUTPATIENT)
Dept: INTERNAL MEDICINE CLINIC | Age: 55
End: 2020-05-12

## 2020-05-12 RX ORDER — METOPROLOL SUCCINATE 50 MG/1
TABLET, EXTENDED RELEASE ORAL
Qty: 30 TABLET | Refills: 1 | Status: SHIPPED | OUTPATIENT
Start: 2020-05-12 | End: 2020-07-07 | Stop reason: SDUPTHER

## 2020-05-14 ENCOUNTER — TELEPHONE (OUTPATIENT)
Dept: INTERNAL MEDICINE CLINIC | Age: 55
End: 2020-05-14

## 2020-05-15 RX ORDER — AMITRIPTYLINE HYDROCHLORIDE 100 MG/1
TABLET, FILM COATED ORAL
Qty: 30 TABLET | Refills: 2 | Status: SHIPPED | OUTPATIENT
Start: 2020-05-15 | End: 2020-09-18 | Stop reason: SDUPTHER

## 2020-05-18 ENCOUNTER — CLINICAL DOCUMENTATION (OUTPATIENT)
Dept: PODIATRY | Age: 55
End: 2020-05-18

## 2020-05-18 NOTE — PROGRESS NOTES
back 04/2008    Pain, eye, right 5/14/04    Pancreatitis 5/14/04    Pseudocyst, pancreas 5/14/04    Scalp lesion 08/2007    Tinea pedis     Tobacco abuse 03/2008    Vaginal bleeding, abnormal 6/2007    Wears dentures           OBJECTIVE  Patient presents to the clinic with assistance of a wheelchair and accompanied by her daughter wearing dirty CAM boots to bilateral lower extremities. Patient awake, alert, and oriented without any signs of acute distress.     VASCULAR: DP and PT pulses are faintly palpable b/l. CFT < 3 seconds to the remaining digits of the left foot and right TMA stump site. Skin temperature warm to warm from proximal to distal with no focal calor and skin temperature equal as compared to the contralateral limb. Mild diffuse non-pitting edema noted to b/l LE specifically noted to the abraham-malleolar region.      NEUROLOGIC: Gross and epicritic sensation is diminished b/l. Protective sensation is diminished at all pedal sites b/l.     DERMATOLOGIC:  Diffuse dermatological changes noted b/l LE. Left foot toenails 2-5 are within appropriate length, discolored and thickend. Webspaces 2-4 C/D/I. Diffuse flaky and xerotic tissue noted to b/l LE, L>R. Full thickness ulceration measuring approximately 1.5 cm in diameter noted to the left foot sub 1st metatarsal head with a mixture of granular and fibrotic base with macerated and hyperkeratotic periwound. Wound does not probe to bone. No tunneling, tracking, or drainage noted. No fluctuance or crepitus noted. Diffuse, musky smell 2/2 poor hygiene habits. No periwound erythema. No signs of acute infection noted.      Right foot serous blister noted to the plantar lateral aspectt of the foot measuring approximately 4 cm x 3 cm, upon debridement a partial thickness ulceration, sub cuboid was noted. The base is granular/epithelialized. Wound does not probe to bone. No tunneling, tracking, or drainage noted. No fluctuance or crepitus noted. Diffuse, musky smell 2/2 poor hygiene habits. No periwound erythema. No signs of acute infection noted.     MUSCULOSKELETAL: Muscle strength is 5/5 for all pedal groups tested b/l. No pain to palpation the b/l LE. Transmetatarsal amputation to right foot. Left foot hallux amputation.        ASSESSMENT  1. Full thickness ulceration, sub 1st metatarsal head - Carpenter II  2. Onychomycosis 2-4, left  3. Diabetes Mellitus Type II uncontrolled with peripheral neuropathy  4. History of non-compliance with weightbearing status and dressing changes  5. Personal History of Nicotine Dependence       PLAN  -Evaluation and treatment x30 minutes with >50% of the time discussing with patient the etiology and treatment of her condition.   -Sharp excisional debridement of macerated and fibrotic tissue left foot sub-first metatarsal head with dermal curette down to and including subcutaneous tissue. <25 cm² of tissue debrided. Estimated blood loss <5 cc of blood. Hemostasis was achieved with direct pressure. Patient tolerated the procedure well.  Daniel Antonio excisional debridement of blister right foot sub cuboid with #15 blade down to the dermal tissue. <25 cm² of tissue debrided. Estimated blood loss <5 cc of blood. Hemostasis was achieved with direct pressure.   Patient tolerated the procedure well.  -Left lower extremity dressing changed consisting of: Modesta to the wound bed, Betadine painted to the periwound borders, DSD, and ACE Bandage.  -Right lower extremity dressing changed consisting of: betadine to the periwound, adaptic to the wound bed, DSD, and ACE bandage.  -Instructed patient to perform every other day dressing to bilateral lower extremities of the above dressing changes, patient and daughter voiced understanding.  -Patient is continued strict NON-weightbearing to bilateral lower extremity.  -Reiterated to patient to elevate her lower extremities as much as possible to aid in edema control.  -Had a long discussion

## 2020-05-26 RX ORDER — ESCITALOPRAM OXALATE 10 MG/1
10 TABLET ORAL DAILY
Qty: 30 TABLET | Refills: 1 | Status: SHIPPED | OUTPATIENT
Start: 2020-05-26 | End: 2020-07-07 | Stop reason: SDUPTHER

## 2020-05-26 NOTE — TELEPHONE ENCOUNTER
Wattio,[email or web address]  [email or web address],loseit [email or web address],[email or web address]  [email or web address]  myfitnesspal [email or web address],sparkpeople  [email or web address],loseit com,calorieking  com,Last visit 05-04-20 Dr Bland Bones   Next visit none 

## 2020-05-28 RX ORDER — METFORMIN HYDROCHLORIDE 500 MG/1
TABLET, EXTENDED RELEASE ORAL
Qty: 90 TABLET | Refills: 3 | Status: SHIPPED | OUTPATIENT
Start: 2020-05-28 | End: 2020-09-18 | Stop reason: SDUPTHER

## 2020-06-01 ENCOUNTER — CLINICAL DOCUMENTATION (OUTPATIENT)
Dept: PODIATRY | Age: 55
End: 2020-06-01

## 2020-06-01 NOTE — PROGRESS NOTES
Department of Podiatry  Resident Progress Note     Edita Mensah  Allergies: Sulfa antibiotics     SUBJECTIVE  The patient is a 62 y.o. female who presents to Dr. Mike Santamaria private clinic location for follow up of a chronic longstanding wound to bilateral lower extremities. Patient presents to clinic with her daughter and states she has been doing her every other day dressing changes to bilateral lower extremities, however needs to go to the store to get more supplies. She has been using papertowel as gauze. Patient has not been compliant with non-weightbearing status. Of note, patient is a type 2 diabetic and her last blood sugar was checked this AM and it was in the 120's mg/dL. She admits to having numbness, tingling and slight burning sensation to bilateral lower extremities. Patient denies nausea, vomiting, fever, chills, cough, shortness of breath, flu or cold-like symptoms. Patient has no other pedal complaints at today's visit.     Lab Results   Component Value Date    LABA1C 8.7 10/07/2019     Lab Results   Component Value Date    .0 10/07/2019       ROS: A 10 point review of systems was conducted, significant findings as noted in HPI.  All other systems negative.     Past Medical History:    Past Medical History:   Diagnosis Date    Amenorrhea     Anomalies of nails     Asthma 5/14/04    Athlete's foot 8/2010    Bacterial vaginosis 04/2008    Carpal tunnel syndrome 5/2007    [facility]     Diabetes mellitus type II 08/2007    Diabetic neuropathy ([facility] 8/10    DVT (deep venous thrombosis) ([facility] 3/2004    Dyslipidemia 5/2009    Dyspareunia 05/2009    ETOH abuse 3/04/2007    Feet clawing     HTN (hypertension)     Hx of blood clots     Hyperlipidemia     MRSA (methicillin resistant staph aureus) culture positive 11/06/2017; 11/17/2017    foot; leg     Neuropathy 05/2009    polyneuropathy    Pain, back 04/2008    Pain, eye, right 5/14/04    Pancreatitis 5/14/04    Pseudocyst, pancreas 5/14/04    Scalp lesion 08/2007    Tinea pedis     Tobacco abuse 03/2008    Vaginal bleeding, abnormal 6/2007    Wears dentures           OBJECTIVE  Patient presents to the clinic with assistance of a wheelchair and accompanied by her daughter wearing dirty CAM boots to bilateral lower extremities. Patient awake, alert, and oriented without any signs of acute distress.     VASCULAR: DP and PT pulses are faintly palpable b/l. CFT < 3 seconds to the remaining digits of the left foot and right TMA stump site. Skin temperature warm to warm from proximal to distal with no focal calor and skin temperature equal as compared to the contralateral limb. Mild diffuse non-pitting edema noted to b/l LE.      NEUROLOGIC: Gross and epicritic sensation is diminished b/l. Protective sensation is diminished at all pedal sites b/l.     DERMATOLOGIC:  Diffuse dermatological changes noted b/l LE. Left foot toenails 2-5 are within appropriate length, discolored and thickend. Webspaces 2-4 clean, dry, and intact. Diffuse flaky and xerotic tissue noted to b/l LE, L>R. Full thickness ulceration measuring approximately 1 cm in diameter noted to the left foot sub 1st metatarsal head with a mixture of granular and fibrotic base with  hyperkeratotic periwound. Wound does not probe to bone. No tunneling, tracking, or drainage noted. No fluctuance or crepitus noted. Diffuse, musky smell 2/2 poor hygiene habits. No periwound erythema. No signs of acute infection noted.      Right foot partial thickness ulceration sub-cuboid noted measuring approximately 3 cm x 1 cm x 0.2 cm. The base is a mixture of granular and epithelialized tissue. Wound does not probe to bone. No tunneling, tracking, or drainage noted. No fluctuance or crepitus noted. Diffuse, musky smell 2/2 poor hygiene habits. No periwound erythema.   No signs of acute infection noted.     MUSCULOSKELETAL: Muscle strength is 5/5

## 2020-06-12 ENCOUNTER — CLINICAL DOCUMENTATION (OUTPATIENT)
Dept: PODIATRY | Age: 55
End: 2020-06-12

## 2020-06-12 NOTE — PROGRESS NOTES
Department of Podiatry  Resident Progress Note     Nydia Panda  Allergies: Sulfa antibiotics     SUBJECTIVE  The patient is a 62 y.o. female who presents to Dr. Guerrero Done private clinic location for follow up of a chronic longstanding wound to bilateral lower extremities. Patient presents to clinic with her daughter and states she has been doing her every other day dressing changes to bilateral lower extremities. Patient has not been compliant with non-weightbearing status and missed her last appointment. Of note, patient is a type 2 diabetic and her last blood sugar was checked this AM and it was in the 120's mg/dL. She admits to having numbness, tingling and slight burning sensation to bilateral lower extremities. Patient denies nausea, vomiting, fever, chills, cough, shortness of breath, flu or cold-like symptoms. Patient has no other pedal complaints at today's visit.     Lab Results   Component Value Date    LABA1C 8.7 10/07/2019     Lab Results   Component Value Date    .0 10/07/2019       ROS: A 10 point review of systems was conducted, significant findings as noted in HPI.  All other systems negative.     Past Medical History:    Past Medical History:   Diagnosis Date    Amenorrhea     Anomalies of nails     Asthma 5/14/04    Athlete's foot 8/2010    Bacterial vaginosis 04/2008    Carpal tunnel syndrome 5/2007    [facility]ronic obstructive [facility] disease) [facility] [address]     Diabetes mellitus type II 08/2007    Diabetic neuropathy ([address] 8/10    DVT (deep venous thrombosis) ([address] 3/2004    Dyslipidemia 5/2009    Dyspareunia 05/2009    ETOH abuse 3/04/2007    Feet clawing     HTN (hypertension)     Hx of blood clots     Hyperlipidemia     MRSA (methicillin resistant staph aureus) culture positive 11/06/2017; 11/17/2017    foot; leg     Neuropathy 05/2009    polyneuropathy    Pain, back 04/2008    Pain, eye, right 5/14/04    Pancreatitis 5/14/04    Pseudocyst, pancreas 5/14/04    Scalp tested b/l. No pain to palpation the b/l LE. Transmetatarsal amputation to right foot. Left foot hallux amputation.        ASSESSMENT  1. Full thickness ulceration, sub 1st metatarsal head Left - Carpenter II  2. Partial thickness ulceration, sub cuboid Right - Carpenter I  3. Onychomycosis 2-4, left  4. Diabetes Mellitus Type II uncontrolled with peripheral neuropathy  5. History of non-compliance with weightbearing status and dressing changes  6. Personal History of Nicotine Dependence       PLAN  -Evaluation and treatment x20 minutes with >50% of the time discussing with patient the etiology and treatment of her condition.   -Sharp excisional debridement of macerated and fibrotic tissue left foot sub-first metatarsal head with dermal curette down to and including subcutaneous tissue. <25 cm² of tissue debrided. Estimated blood loss <5 cc of blood. Hemostasis was achieved with direct pressure. Patient tolerated the procedure well.   Dairl Mariana excisional debridement of ulceration left foot foot sub cuboid with dermal curette down to the dermal tissue. <25 cm² of tissue debrided. Estimated blood loss <5 cc of blood. Hemostasis was achieved with direct pressure. Patient tolerated the procedure well.  -Left lower extremity dressing changed consisting of: Modesta to the wound bed, Betadine painted to the periwound borders, DSD, and coban. -Right lower extremity dressing changed consisting of: betadine to the periwound, adaptic to the wound bed, DSD, and coban.   -Instructed patient to perform every other day dressing to bilateral lower extremities of the above dressing changes with ACE bandage, patient and daughter voiced understanding.  -Patient is continued strict NON-weightbearing to bilateral lower extremity.  -Reiterated to patient to elevate her lower extremities as much as possible to aid in edema control.  -Had a long discussion with patient about being non-compliant with weightbearing status and that any amount

## 2020-06-22 ENCOUNTER — CLINICAL DOCUMENTATION (OUTPATIENT)
Dept: PODIATRY | Age: 55
End: 2020-06-22

## 2020-06-22 NOTE — PROGRESS NOTES
positive 11/06/2017; 11/17/2017    foot; leg     Neuropathy 05/2009    polyneuropathy    Pain, back 04/2008    Pain, eye, right 5/14/04    Pancreatitis 5/14/04    Pseudocyst, pancreas 5/14/04    Scalp lesion 08/2007    Tinea pedis     Tobacco abuse 03/2008    Vaginal bleeding, abnormal 6/2007    Wears dentures           OBJECTIVE  Patient presents to the clinic with assistance of a wheelchair and accompanied by her daughter wearing dirty CAM boots to bilateral lower extremities. Dressings are dry, intact, and dirty with animal (Dog & Cat) hair. Patient is A&Ox3 and NAD.     VASCULAR: DP and PT pulses are palpable b/l. CFT brisk to the remaining digits on the left foot and right TMA dorsal stump site. Skin temperature warm to lukewarm from proximal to distal with no focal calor B/L LE. Moderate diffuse non-pitting edema noted to b/l LE.      NEUROLOGIC: Gross and epicritic sensation is diminished b/l. Protective sensation is diminished at all pedal sites b/l.     DERMATOLOGIC:  Dermatological changes noted B/L LE. Left foot toenails 2-5 WNL, discolored yellow and thickend. Webspaces 2-4 clean, dry, and intact. Diffuse flaky/xerotic tissue noted to B/L LE. Full thickness ulceration noted to the plantar aspect sub-first metatarsal head that measuring 0.1 cm in diameter. wound base is pink/red granular tissue with hyperkeratotic borders and periwound. Wound does not probe to bone, tunnels, or tracks. No drainage, fluctuance, crepitus noted. Musky body odor noted 2/2 poor hygiene habits. Wound appears stable and without acute signs of infection or periwound erythema.       Right foot plantar aspect of cuboid region partial thickness ulceration that measuring approximately 1.8 cm x 0.6 cm x 0.1 cm. Wound base is a mixture of pink granular with dermal tissue and dermal/epithelialized borders. Wound does not probe to bone, tunnels, or tracks. No fluctuance, crepitus, malodor noted.   Musky body odor noted 2/2 poor hygiene habits. Wound appears stable and without acute signs of infection or periwound erythema.     MUSCULOSKELETAL: Muscle strength is 5/5 for all pedal groups tested b/l. No pain to palpation the b/l LE. Transmetatarsal amputation to right foot. Left foot hallux amputation.        ASSESSMENT  Full thickness ulceration, sub 1st metatarsal head Left - Carpenter I  Partial thickness ulceration, sub cuboid Right 2/2 blister - Carpenter I  Onychomycosis x 4, left MI having  Diabetes Mellitus Type II uncontrolled with peripheral neuropathy  History of non-compliance with weightbearing status and dressing changes  Personal History of Nicotine Dependence       PLAN  -Evaluation and treatment x30 minutes with >50% of the time discussing with patient the etiology and treatment of her condition.   -Sharp excisional debridement of hyperkeratotic tissue to the left foot sub-first metatarsal head with a #15 blade to subcutaneous tissue. Less darin 20 cm² of tissue debrided. Estimated blood loss less than 1 cc of blood. Hemostasis was achieved with direct pressure. Patient tolerated the procedure well.  -Left lower extremity dressing: Betadine, DSD, and Ace bandage.  -Right lower extremity dressing: Neosporin to the wound bed, DSD, and Ace bandage.  -Instructed patient to continue to perform every other day dressing change to B/L LE consisting of the instructions as seen above. Patient and daughter understood.  -Patient also instructed to be continued to be strict nonweightbearing to B/L lower extremity.   Patient and daughter understood  -Instructed patient to elevate both lower extremities as much as possible to aid in edema control.  -Instructed patient on proper diabetic foot education including but not limited to: Having a tight glycemic index whether through food intake or exercise, checking blood sugars daily, inspecting feet daily, and never walking barefoot.  -Had a long discussion with patient and her daughter about having a better and tighter blood sugar. Also discussed with patient that if her neuropathic pain continues to go to her primary care physician to see if they need to change her up the dose of her gabapentin. Patient understood  -Instructed patient to also call wherever she got her TMA insert to see if they can modify.   Patient informed me that she got a blister while putting in her new insert and walking on it  -Patient is to return to Dr. Rashad Martinez private office in 1 week.     Otis Rutherford DPM   Podiatric Resident, PGY-1  6/22/2020, 3:35 PM

## 2020-06-29 ENCOUNTER — CLINICAL DOCUMENTATION (OUTPATIENT)
Dept: PODIATRY | Age: 55
End: 2020-06-29

## 2020-06-29 NOTE — PROGRESS NOTES
polyneuropathy    Pain, back 04/2008    Pain, eye, right 5/14/04    Pancreatitis 5/14/04    Pseudocyst, pancreas 5/14/04    Scalp lesion 08/2007    Tinea pedis     Tobacco abuse 03/2008    Vaginal bleeding, abnormal 6/2007    Wears dentures           OBJECTIVE  Patient presents to the clinic with assistance from a wheelchair and accompanied by her daughter wearing dirty CAM boots to bilateral lower extremities and dirty Ace Bandages. Patient is A&Ox3 and NAD.     VASCULAR: DP and PT pulses are palpable b/l. CFT less than 3 seconds to the remaning digits on the left foot and right TMA dorsal stump site. Skin temperature warm to cool from proximal to distal with no focal calor B/L LE. Moderate diffuse non-pitting edema noted to b/l LE. Relaxed skin tension lines noted B/L LE    NEUROLOGIC: Gross and epicritic sensation is diminished b/l. Protective sensation is diminished at all pedal sites b/l.     DERMATOLOGIC:  Dermatological changes noted B/L LE. Left foot toenails 2-5 WNL, discolored yellow and thickend. Webspaces 2-4 clean, dry, and intact. Diffuse flaky/xerotic tissue noted to B/L LE. Partial thickness ulceration noted to the plantar aspect sub-first metatarsal head that measuring less than 0.1 cm in diameter. Wound base is granular tissue with slightly hyperkeratotic borders. Wound does not probe to bone, tunnels, or tracks. No drainage, fluctuance, crepitus noted. Musky body odor noted 2/2 poor hygiene habits. Wound appears stable and without acute signs of infection or periwound erythema.       Right foot plantar aspect of cuboid region partial thickness ulceration that measuring approximately 1.5 cm x 0.7 cm x 0.1 cm and clinically improving and slowly decreasing in size. Wound base pink dermal tissue with macerated borders 2/2 excess of Neosporin ointment. Wound does not probe to bone, tunnels, or tracks. No fluctuance, crepitus, malodor noted.   Musky body odor noted 2/2 poor hygiene

## 2020-07-06 ENCOUNTER — CLINICAL DOCUMENTATION (OUTPATIENT)
Dept: PODIATRY | Age: 55
End: 2020-07-06
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
## 2020-07-07 RX ORDER — ESCITALOPRAM OXALATE 10 MG/1
10 TABLET ORAL DAILY
Qty: 30 TABLET | Refills: 1 | Status: SHIPPED | OUTPATIENT
Start: 2020-07-07 | End: 2020-08-27 | Stop reason: SDUPTHER

## 2020-07-07 RX ORDER — METOPROLOL SUCCINATE 50 MG/1
TABLET, EXTENDED RELEASE ORAL
Qty: 30 TABLET | Refills: 1 | Status: SHIPPED | OUTPATIENT
Start: 2020-07-07 | End: 2020-07-08 | Stop reason: SDUPTHER

## 2020-07-07 RX ORDER — GABAPENTIN 400 MG/1
400 CAPSULE ORAL 3 TIMES DAILY
Qty: 90 CAPSULE | Refills: 2 | Status: SHIPPED | OUTPATIENT
Start: 2020-07-07 | End: 2020-07-08 | Stop reason: SDUPTHER

## 2020-07-07 NOTE — TELEPHONE ENCOUNTER
Needs refill on lexapro last filled 5-26-20 with 1 refill   Gabapentin . Last filled 4-14-20 with 2 refills. metoprolol last filled 5-12-20 with 2 refills  Send to [facility] on [address].   Last seen 5-4-20

## 2020-07-08 RX ORDER — GABAPENTIN 400 MG/1
400 CAPSULE ORAL 3 TIMES DAILY
Qty: 90 CAPSULE | Refills: 2 | Status: SHIPPED | OUTPATIENT
Start: 2020-07-08 | End: 2020-09-18 | Stop reason: SDUPTHER

## 2020-07-08 RX ORDER — APIXABAN 5 MG/1
TABLET, FILM COATED ORAL
Qty: 60 TABLET | Refills: 2 | Status: SHIPPED | OUTPATIENT
Start: 2020-07-08 | End: 2020-09-18 | Stop reason: SDUPTHER

## 2020-07-08 RX ORDER — METOPROLOL SUCCINATE 50 MG/1
TABLET, EXTENDED RELEASE ORAL
Qty: 30 TABLET | Refills: 1 | Status: SHIPPED | OUTPATIENT
Start: 2020-07-08 | End: 2020-09-18 | Stop reason: SDUPTHER

## 2020-07-08 NOTE — TELEPHONE ENCOUNTER
LAST OV 05/04/2020 DAVEY  NEXT OV NOT SCHEDULED    gabapentin (NEURONTIN) 400 MG capsule    metoprolol succinate (TOPROL XL) 50 MG extended release tablet    ELIQUIS 5 MG TABS tablet

## 2020-07-13 ENCOUNTER — CLINICAL DOCUMENTATION (OUTPATIENT)
Dept: PODIATRY | Age: 55
End: 2020-07-13

## 2020-07-13 NOTE — PROGRESS NOTES
Department of Podiatry  Resident Progress Note     Edi Hood  Allergies: Sulfa antibiotics     SUBJECTIVE  The patient is a 62 y.o. female who presents to Dr. Chastity Pierre private clinic location for follow up evaluation of wounds to bilateral lower extremities. The patient expressed that she has been doing daily dressing changes to the lower extremities as instructed, and has been doing better wearing her CAM boots. She also states her blood sugar has been uncontrolled this week and was as high as 300s, 318 if she remembers correctly. Patient denies nausea, vomiting, chills, shortness of breath, chest pain, calf pain, cold or flu like symptoms. Patient has no other pedal complaints during today's evaluation.       Lab Results   Component Value Date    LABA1C 8.7 10/07/2019     ROS: A 10 point review of systems was conducted, significant findings as noted in HPI.  All other systems negative.     Past Medical History:    Past Medical History:   Diagnosis Date    Amenorrhea     Anomalies of nails     Asthma 5/14/04    Athlete's foot 8/2010    Bacterial vaginosis 04/2008    Carpal tunnel syndrome 5/2007    COPD [facility]chronic obstructive [facility] disease) [facility] [address]     Diabetes mellitus type II 08/2007    Diabetic neuropathy ([facility] [address] 8/10    DVT (deep venous thrombosis) ([facility] [address] 3/2004    Dyslipidemia 5/2009    Dyspareunia 05/2009    ETOH abuse 3/04/2007    Feet clawing     HTN (hypertension)     Hx of blood clots     Hyperlipidemia     MRSA (methicillin resistant staph aureus) culture positive 11/06/2017; 11/17/2017    foot; leg     Neuropathy 05/2009    polyneuropathy    Pain, back 04/2008    Pain, eye, right 5/14/04    Pancreatitis 5/14/04    Pseudocyst, pancreas 5/14/04    Scalp lesion 08/2007    Tinea pedis     Tobacco abuse 03/2008    Vaginal bleeding, abnormal 6/2007    Wears dentures           OBJECTIVE  Patient presents to the clinic with assistance from a wheelchair and accompanied by her daughter wearing dirty dressings and CAM boots to bilateral lower extremities. Her right foot dressing consisted of folded up paper towel and an Ace bandage. Moderate serous drainage noted to the paper towel. Patient is awake, alert, and oriented without acute signs of distress.     VASCULAR: DP and PT pulses are palpable  2/4 b/l. CFT less than 3 seconds to the remaning digits on the left foot and right TMA distal stump site. Skin temperature warm to cool from proximal to distal with no focal calor b/l LE. Mild diffuse non-pitting edema noted to b/l LE.    NEUROLOGIC: Gross and epicritic sensation is diminished b/l. Protective sensation is diminished at all pedal sites b/l.     DERMATOLOGIC:  Dermatological changes noted B/L LE. Left foot toenails 2-5 elongated, discolored yellow and thickend. Webspaces 2-4 clean, dry, and intact. Diffuse flaky/xerotic tissue noted to B/L LE. #1 Hyperkeratotic tissue noted to the plantar aspect sub-first metatarsal head, upon debridement no ulceration noted. No drainage, fluctuance, crepitus noted. Musky body odor noted 2/2 poor hygiene habits. #2 Right foot plantar aspect of foot near cuboid region partial thickness ulceration that measures approximately 1.5 cm x 0.5 cm x 0.1 cm  Wound base pink dermal tissue with macerated borders. Wound does not probe to bone, tunnel, or track. No fluctuance, crepitus, or drainage noted. Musky body odor noted 2/2 poor hygiene habits. Wound appears stable and without acute signs of infection or periwound erythema.     MUSCULOSKELETAL: Muscle strength is 5/5 for all pedal groups tested b/l. No pain to palpation the b/l LE. Transmetatarsal amputation to right foot. Left foot hallux amputation.        ASSESSMENT  1. Hyperkeratotic tissue, sub 1st metatarsal head Left - Carpenter I  2. Partial thickness ulceration, sub cuboid Right 2/2 friction - Carpenter I  3. Onychomycosis x 4, Left  4.  Diabetes Mellitus Type II uncontrolled with peripheral neuropathy  5. History of non-compliance with weightbearing status and dressing changes  6. Personal History of Nicotine Dependence       PLAN  -Evaluation and treatment x30 minutes with >50% of the time discussing with patient the etiology and treatment of her condition.   -Sharp excisional debridement of hyperkeratotic tissue to the left foot sub-first metatarsal head with a #15 blade down to epidermis, no underlying ulceration noted. Less than 10 cm² of tissue debrided. Patient tolerated the procedure well.  -Left lower extremity dressing: Ace bandage  -Right lower extremity dressing: betadine, DSD, and Ace bandage  -Instructed patient to continue to perform every other day dressing change to Right LE consisting of the dressings as seen above. Patient is to wear compression to the Left LE daily, instructed to remove the dressing nightly and check for new areas of rubbing or wounds. Patient and daughter understood.  -Patient also instructed to be continued to be strict nonweightbearing to Right lower extremity in CAM boot, instructed patient to wear supportive shoe and insert and weightbear as tolerated to the left. If she notices new areas of rubbing or wounds she is to wear her CAM boot. Patient and daughter understood.  -Maury Corey patient to elevate both lower extremities as much as possible to aid in edema control.  -Instructed patient on proper diabetic foot education including but not limited to: Having a tight glycemic index whether through food intake or exercise, checking blood sugars daily, inspecting feet daily, and never walking barefoot.  -Instructed patient to call her PCP to discuss her Gabapentin Rx and to only take as prescribed.   -Instructed patient if she notes signs of infection, including but not limited to - increased redness/streaking up the leg, purulent drainage, pain out of proportion, fevers, chills, or shortness of breath to call Dr. Stephenie Feldman office or go to the nearest emergency department for evaluation and treatment.  Patient and daughter understood.  -Patient is to return to Dr. Gonsalo Saenz private office in 1 week for wound re-evaluation       True Humphrey DPM  Podiatric Resident PGY2  Please contact via [email or web address]  7/13/2020, 12:59 PM

## 2020-07-20 ENCOUNTER — CLINICAL DOCUMENTATION (OUTPATIENT)
Dept: PODIATRY | Age: 55
End: 2020-07-20

## 2020-07-27 ENCOUNTER — CLINICAL DOCUMENTATION (OUTPATIENT)
Dept: PODIATRY | Age: 55
End: 2020-07-27

## 2020-07-27 NOTE — PROGRESS NOTES
Department of Podiatry  Resident Progress Note    Almita Peters  Allergies: Sulfa antibiotics    SUBJECTIVE  The patient is a 60 y.o. female who presents to Dr. Liz Godwin private clinic location for follow up evaluation of wounds to bilateral lower extremities. She reports improvement in her wound since last week. The patient expressed that she has been doing daily dressing changes to the lower extremities as instructed, and has been doing better wearing her CAM boots. Patient denies nausea, vomiting, chills, shortness of breath, chest pain, calf pain, cold or flu like symptoms. Patient has no other pedal complaints during today's evaluation. Past Medical History:        Diagnosis Date    Amenorrhea     Anomalies of nails     Asthma 5/14/04    Athlete's foot 8/2010    Bacterial vaginosis 04/2008    Carpal tunnel syndrome 5/2007    [facility]) ([facility] [address]     Diabetes mellitus type II 08/2007    Diabetic neuropathy ([facility] [address] 8/10    DVT (deep venous thrombosis) ([facility] [address] 3/2004    Dyslipidemia 5/2009    Dyspareunia 05/2009    ETOH abuse 3/04/2007    Feet clawing     HTN (hypertension)     Hx of blood clots     Hyperlipidemia     MRSA (methicillin resistant staph aureus) culture positive 11/06/2017; 11/17/2017    foot; leg     Neuropathy 05/2009    polyneuropathy    Pain, back 04/2008    Pain, eye, right 5/14/04    Pancreatitis 5/14/04    Pseudocyst, pancreas 5/14/04    Scalp lesion 08/2007    Tinea pedis     Tobacco abuse 03/2008    Vaginal bleeding, abnormal 6/2007    Wears dentures        Review of Systems:   A 12 point review of systems is unremarkable with the exception of the chief complaint. Patient specifically denies nausea, vomiting, fever, chills, shortness of breath, chest pain, abdominal pain, constipation, or difficulty urinating. OBJECTIVE  Patient presents to clinic with assistance of a wheelchair and accompanied by her daughter.  She is wearing CAM boots to B/L LE.    VASCULAR: DP and PT pulses are palpable 2/4 b/l. CFT is brisk to the right TMA distal stump site and remaning digits on the left foot. Skin temperature warm to cool from proximal to distal with no focal calor b/l LE.  Mild diffuse non-pitting edema noted to b/l LE.    NEUROLOGIC: Protective sensation is diminished at all pedal sites b/l. Gross and epicritic sensation is diminished b/l. DERMATOLOGIC: Dermatological changes noted B/L LE. Left foot toenails 2-5 elongated, discolored yellow and thickend. Webspaces 2-4 clean, dry, and intact.  Diffuse flaky/xerotic tissue noted to B/L LE. #1 Hyperkeratotic tissue noted to the plantar aspect sub-first metatarsal, head left foot. Upon debridement no ulceration noted. No drainage, fluctuance, crepitus, or malodor noted.      #2 Partial thickness ulceration right foot plantar aspect near cuboid region that measures approximately 0.9 cm x 1.5 cm x 0.1 cm  Wound base pink dermal tissue with macerated borders. Wound does not probe to bone, tunnel, or track. No fluctuance, crepitus, or drainage noted. No malodor noted from wound. Wound appears stable and without acute signs of infection or periwound erythema. MUSCULOSKELETAL: Muscle strength is 5/5 for all pedal groups tested. Pain with palpation of the right foot at distal aspect of the TMA site. Pain is deep with palpation of distal aspects of lesser metatarsals that feel bulbous in nature. Ankle joint ROM is decreased in dorsiflexion with the knee extended. No obvious biomechanical abnormalities. ASSESSMENT  1. Hyperkeratotic tissue, sub 1st metatarsal head Left - Carpenter 0  2. Partial thickness ulceration, sub cuboid Right 2/2 friction - Carpenter 1  3. Onychomycosis x 4, Left  4. Diabetes Mellitus Type II uncontrolled with peripheral neuropathy  5. History of non-compliance with weightbearing status and dressing changes  6.  Foot pain, right    PLAN  -Evaluation and management x 20 minutes and greater than 50% of the time spent explaining the etiology and treatment with the patient.   Onita  excisional debridement of hyperkeratotic tissue to the left foot sub-first metatarsal head with a #15 blade down to epidermis, no underlying ulceration noted. Less than 10 cm² of tissue debrided. Patient tolerated the procedure well.  Onita  excisional debridement of hyperkeratotic tissue to the right foot sub-cuboid with a #15 blade down to epidermis with partial thickness ulceration noted. Less than 10 cm² of tissue debrided. Patient tolerated the procedure well. -Right lower extremity dressing: betadine, DSD, and Ace bandage  -Left lower extremity dressing: Ace bandage  -Instructed patient to have Xray left foot to assess right foot pain. Patient refused for now. Xray ordered in case patient changes mind.  -Patient instructed to be continued to be strict nonweightbearing to Right lower extremity in CAM boot, instructed patient to wear supportive shoe and insert and weightbear as tolerated to the left. If she notices new areas of rubbing or wounds she is to wear her CAM boot. Patient and daughter understood  -Instructed patient to continue to perform dressing changes every other day to Right LE consisting of the dressings as seen above. Patient is to wear compression to the Left LE daily, instructed to remove the dressing nightly and check for new areas of rubbing or wounds.  Patient and daughter understood.  -Roger Valiente patient to elevate both lower extremities as much as possible to aid in edema control.  -Instructed patient on proper diabetic foot education including but not limited to: Having a tight glycemic index whether through food intake or exercise, checking blood sugars daily, inspecting feet daily, and never walking barefoot.  -Instructed patient if she notes signs of infection, including but not limited to - increased redness/streaking up the leg, purulent drainage, pain out of proportion, fevers,

## 2020-08-10 ENCOUNTER — CLINICAL DOCUMENTATION (OUTPATIENT)
Dept: PODIATRY | Age: 55
End: 2020-08-10

## 2020-08-12 NOTE — PROGRESS NOTES
Department of Podiatry  Resident Progress Note    Edi Hood  Allergies: Sulfa antibiotics    SUBJECTIVE  The patient is a 60 y.o. female who presents to Dr. Chastity Pierre private clinic location for follow up evaluation of wounds to bilateral lower extremities. She reports that she did not feel like coming to clinic last week and missed her appointment. The patient expressed that she has been doing daily dressing changes to the lower extremities as instructed, and has been doing better wearing her CAM boots. Patient denies nausea, vomiting, chills, shortness of breath, chest pain, calf pain, cold or flu like symptoms.  Patient has no other pedal complaints during today's evaluation. Past Medical History:        Diagnosis Date    Amenorrhea     Anomalies of nails     Asthma 5/14/04    Athlete's foot 8/2010    Bacterial vaginosis 04/2008    Carpal tunnel syndrome 5/2007    [facility] [address]     Diabetes mellitus type II 08/2007    Diabetic neuropathy ([facility] [address] 8/10    DVT (deep venous thrombosis) ([facility] [address] 3/2004    Dyslipidemia 5/2009    Dyspareunia 05/2009    ETOH abuse 3/04/2007    Feet clawing     HTN (hypertension)     Hx of blood clots     Hyperlipidemia     MRSA (methicillin resistant staph aureus) culture positive 11/06/2017; 11/17/2017    foot; leg     Neuropathy 05/2009    polyneuropathy    Pain, back 04/2008    Pain, eye, right 5/14/04    Pancreatitis 5/14/04    Pseudocyst, pancreas 5/14/04    Scalp lesion 08/2007    Tinea pedis     Tobacco abuse 03/2008    Vaginal bleeding, abnormal 6/2007    Wears dentures      Review of Systems: A 12 point review of systems is unremarkable with the exception of the chief complaint. Patient specifically denies nausea, vomiting, fever, chills, shortness of breath, chest pain, abdominal pain, constipation, or difficulty urinating.          OBJECTIVE  Patient presents to clinic with assistance of a wheelchair and accompanied by her daughter. She is wearing CAM boots to B/L LE.     VASCULAR: DP and PT pulses are palpable 2/4 b/l. CFT is brisk to the right TMA distal stump site and remaning digits on the left foot. Skin temperature warm to cool from proximal to distal with no focal calor b/l LE.  Mild diffuse non-pitting edema noted to b/l LE.     NEUROLOGIC: Protective sensation is diminished at all pedal sites b/l. Gross and epicritic sensation is diminished b/l.      DERMATOLOGIC: Dermatological changes noted B/L LE. Left foot toenails 2-5 elongated, discolored yellow and thickend. Webspaces 2-4 clean, dry, and intact.  Diffuse flaky/xerotic tissue noted to B/L LE.      #1 Hyperkeratotic tissue noted to the plantar aspect sub-first metatarsal, head left foot. Upon debridement no ulceration noted.  No drainage, fluctuance, crepitus, or malodor noted.      #2 Partial thickness ulceration right foot plantar aspect near cuboid region that measures approximately 1.3 cm x 1.5 cm x 0.1 cm  Wound base pink dermal tissue with macerated borders. Wound does not probe to bone, tunnel, or track.  No fluctuance, crepitus, or drainage noted. No malodor noted from wound.  Wound appears stable and without acute signs of infection or periwound erythema.     #3 Superficial Abrasion noted to posterolateral right calf. Measures 0.3 cm x 0.2 cm x superficial.  No drainage noted. No fluctuance, crepitus, or odor noted. Wound appears stable and without signs of infection. MUSCULOSKELETAL: Muscle strength is 5/5 for all pedal groups tested. Pain with palpation of the right foot at distal aspect of the TMA site. Pain is deep with palpation of distal aspects of lesser metatarsals that feel bulbous in nature. Ankle joint ROM is decreased in dorsiflexion with the knee extended. No obvious biomechanical abnormalities.         ASSESSMENT  1.  Hyperkeratotic tissue, sub 1st metatarsal head Left - Carpenter 0  2. Partial thickness ulceration, sub cuboid Right 2/2 friction - Carpenter 1  3. Onychomycosis x 4, Left  4. Diabetes Mellitus Type II uncontrolled with peripheral neuropathy  5. History of non-compliance with weightbearing status and dressing changes  6. Foot pain, right     PLAN  -Evaluation and management x 20 minutes and greater than 50% of the time spent explaining the etiology and treatment with the patient.   Ismael Folds excisional debridement of hyperkeratotic tissue to the left foot sub-first metatarsal head with a #15 blade down to epidermis, no underlying ulceration noted. Less than 10 cm² of tissue debrided. Patient tolerated the procedure well.  Ismael Folds excisional debridement of hyperkeratotic tissue to the right foot sub-cuboid with a #15 blade down to epidermis with partial thickness ulceration noted. Hemostasis achieved with direct pressure. Less than 10 cm² of tissue debrided. Patient tolerated the procedure well. -Right lower extremity dressing: betadine, DSD, and Ace bandage. Patient to apply Modesta at home with dressing changes.  -Left lower extremity dressing: Ace bandage  -Instructed patient to have Xray left foot to assess right foot pain. Patient refused for now. Xray ordered in case patient changes mind.  -Patient instructed to be continued to be strict nonweightbearing to Right lower extremity in CAM boot, instructed patient to wear supportive shoe and insert and weightbear as tolerated to the left. If she notices new areas of rubbing or wounds she is to wear her CAM boot.  Patient and daughter understood  -Instructed patient to continue to perform dressing changes every other day to Right LE consisting of the dressings as seen above. Patient is to wear compression to the Left LE daily, instructed to remove the dressing nightly and check for new areas of rubbing or wounds.  Patient and daughter understood.  -Sumeet Foreman patient to elevate both lower extremities as much as possible to aid in edema control.  -Instructed patient on proper diabetic foot education including but not limited to: Having a tight glycemic index whether through food intake or exercise, checking blood sugars daily, inspecting feet daily, and never walking barefoot.  -Instructed patient if she notes signs of infection, including but not limited to - increased redness/streaking up the leg, purulent drainage, pain out of proportion, fevers, chills, or shortness of breath to call Dr. Roxana Hoyt office or go to the nearest emergency department for evaluation and treatment.  Patient and daughter understood.  -Patient is to return to Dr. Roxana Hoyt private office in 1 week for wound re-evaluation    Clifton Cedillo DPM  Podiatric Resident, PGY-1  Pager #: [phone] or [employer]

## 2020-08-17 ENCOUNTER — CLINICAL DOCUMENTATION (OUTPATIENT)
Dept: PODIATRY | Age: 55
End: 2020-08-17

## 2020-08-17 NOTE — PROGRESS NOTES
Department of Podiatry  Resident Progress Note    Carmen Mckeon  Allergies: Sulfa antibiotics    SUBJECTIVE  The patient is a 60 y.o. female who presents to Dr. Melodye Apley private clinic location for follow up evaluation of wounds to bilateral lower extremities. Patient reports that she has done daily dressing changes most days this week to the right foot as instructed, and has been doing better wearing her CAM boot to left foot and post-op shoe to right foot. Patient denies nausea, vomiting, chills, shortness of breath, chest pain, calf pain, cold or flu like symptoms.  Patient has no other pedal complaints during today's evaluation. Past Medical History:        Diagnosis Date    Amenorrhea     Anomalies of nails     Asthma 5/14/04    Athlete's foot 8/2010    Bacterial vaginosis 04/2008    Carpal tunnel syndrome 5/2007    [facility] obstructive [facility] disease) ([facility] [address]     Diabetes mellitus type II 08/2007    Diabetic neuropathy ([facility] [address] 8/10    DVT (deep venous thrombosis) ([facility] [address] 3/2004    Dyslipidemia 5/2009    Dyspareunia 05/2009    ETOH abuse 3/04/2007    Feet clawing     HTN (hypertension)     Hx of blood clots     Hyperlipidemia     MRSA (methicillin resistant staph aureus) culture positive 11/06/2017; 11/17/2017    foot; leg     Neuropathy 05/2009    polyneuropathy    Pain, back 04/2008    Pain, eye, right 5/14/04    Pancreatitis 5/14/04    Pseudocyst, pancreas 5/14/04    Scalp lesion 08/2007    Tinea pedis     Tobacco abuse 03/2008    Vaginal bleeding, abnormal 6/2007    Wears dentures        Review of Systems: A 12 point review of systems is unremarkable with the exception of the chief complaint. Patient specifically denies nausea, vomiting, fever, chills, shortness of breath, chest pain, abdominal pain, constipation, or difficulty urinating. OBJECTIVE  Patient presents to clinic with assistance of a wheelchair and accompanied by her daughter.  She is wearing post-op shoe to tissue, sub 1st metatarsal head Left - Carpenter 0  2. Partial thickness ulceration, sub cuboid Right 2/2 friction - Carpenter 1  3. Partial thickness ulceration, posterolateral right calf, NPUAP 1  4. Onychomycosis x 4, Left  5. Diabetes Mellitus Type II uncontrolled with peripheral neuropathy  6. History of non-compliance with weightbearing status and dressing changes  7. Foot pain, right     PLAN  -Evaluation and management x 30 minutes and greater than 50% of the time spent explaining the etiology and treatment with the patient.   Lisandra Kill excisional debridement of hyperkeratotic tissue to the left foot sub-first metatarsal head with a #15 blade down to epidermis, no underlying ulceration noted. Less than 10 cm² of tissue debrided. Patient tolerated the procedure well.  Lisandra Kill excisional debridement of hyperkeratotic tissue to the right foot sub-cuboid with a #15 blade down to epidermis with partial thickness ulceration noted. Hemostasis achieved with direct pressure. Less than 10 cm² of tissue debrided. Patient tolerated the procedure well. -Applied betadine to 2-4 webspace left foot. Instructed patient to apply betadine to the 2-4 websapces daily.   -Right lower extremity dressing: betadine to foot wound, adaptic to calf wound, DSD, and Ace bandage. Patient to apply Modesta at home with dressing changes.  -Left lower extremity dressing: Ace bandage  -Instructed patient to have Xray left foot to assess right foot pain. Patient refused for now. Xray ordered in case patient changes mind.  -Patient instructed to be continued to be strict nonweightbearing to Right lower extremity in CAM boot, instructed patient to wear supportive shoe and insert and weightbear as tolerated to the left.  If she notices new areas of rubbing or wounds she is to wear her CAM boot.  Patient and daughter understood  -Instructed patient to continue to perform dressing changes every other day to Right LE consisting of the dressings as seen above. Patient is to wear compression to the Left LE daily, instructed to remove the dressing nightly and check for new areas of rubbing or wounds. Patient and daughter understood.  -Kaylene Hdz patient to elevate both lower extremities as much as possible to aid in edema control.  -Instructed patient on proper diabetic foot education including but not limited to: Having a tight glycemic index whether through food intake or exercise, checking blood sugars daily, inspecting feet daily, and never walking barefoot.  -Instructed patient if she notes signs of infection, including but not limited to - increased redness/streaking up the leg, purulent drainage, pain out of proportion, fevers, chills, or shortness of breath to call Dr. Hortencia Torres office or go to the nearest emergency department for evaluation and treatment.  Patient and daughter understood.  -Patient is to return to Dr. Hortencia Torres private office in 1 week for wound re-evaluation     Maryuri Mai DPM  Podiatric Resident, PGY-1  Pager #: [phone] or [place]

## 2020-08-24 ENCOUNTER — CLINICAL DOCUMENTATION (OUTPATIENT)
Dept: PODIATRY | Age: 55
End: 2020-08-24

## 2020-08-24 NOTE — PROGRESS NOTES
Department of Podiatry  Resident Progress Note    Siva Robertson  Allergies: Sulfa antibiotics    SUBJECTIVE  The patient is a 60 y.o. female who presents to Dr. Sincere Strauss private clinic location for follow up evaluation of wounds to bilateral lower extremities. Patient reports that she has done daily dressing changes most days this week to the right foot as instructed, and has been doing better wearing her CAM boot to left foot and post-op shoe to right foot. Patient denies nausea, vomiting, chills, shortness of breath, chest pain, calf pain, cold or flu like symptoms.  Patient has no other pedal complaints during today's evaluation. Past Medical History:        Diagnosis Date    Amenorrhea     Anomalies of nails     Asthma 5/14/04    Athlete's foot 8/2010    Bacterial vaginosis 04/2008    Carpal tunnel syndrome 5/2007    COPD [facility] obstructive [facility] disease) ([facility] [address]     Diabetes mellitus type II 08/2007    Diabetic neuropathy ([facility] [address] 8/10    DVT (deep venous thrombosis) ([facility] [address] 3/2004    Dyslipidemia 5/2009    Dyspareunia 05/2009    ETOH abuse 3/04/2007    Feet clawing     HTN (hypertension)     Hx of blood clots     Hyperlipidemia     MRSA (methicillin resistant staph aureus) culture positive 11/06/2017; 11/17/2017    foot; leg     Neuropathy 05/2009    polyneuropathy    Pain, back 04/2008    Pain, eye, right 5/14/04    Pancreatitis 5/14/04    Pseudocyst, pancreas 5/14/04    Scalp lesion 08/2007    Tinea pedis     Tobacco abuse 03/2008    Vaginal bleeding, abnormal 6/2007    Wears dentures        Review of Systems: A 12 point review of systems is unremarkable with the exception of the chief complaint. Patient specifically denies nausea, vomiting, fever, chills, shortness of breath, chest pain, abdominal pain, constipation, or difficulty urinating. OBJECTIVE  Patient presents to clinic with assistance of a wheelchair and accompanied by her daughter.  She is wearing post-op shoe to RLE & CAM boot to LLE.     VASCULAR: DP and PT pulses are palpable 2/4 b/l. CFT is brisk to the right TMA distal stump site RLE and remaning digits on the left foot. Skin temperature warm to cool from proximal to distal with no focal calor b/l LE.  Mild diffuse non-pitting edema noted to b/l LE.     NEUROLOGIC: Protective sensation is diminished at all pedal sites b/l. Gross and epicritic sensation is diminished b/l.      DERMATOLOGIC: Dermatological changes noted B/L LE. Left foot toenails 2-5 within normal length, but discolored yellow and thickend. Webspaces left foot 2-4 clean dry, and intact.  Diffuse flaky/xerotic tissue noted to B/L LE.      #1 Hyperkeratotic tissue noted to the plantar aspect sub-first metatarsal, head left foot. Upon debridement no ulceration noted.  No drainage, fluctuance, crepitus, or malodor noted.      #2 Partial thickness ulceration right foot plantar aspect near cuboid region that measures approximately 2.8 cm x 2.3 cm x 0.1 cm  Wound base pink dermal tissue with slight fibrotic tissue with heavily macerated borders. Wound does not probe to bone, tunnel, or track.  No fluctuance, crepitus, or drainage noted. No malodor noted from wound.  Wound appears stable and without acute signs of infection or periwound erythema.     #3 Partial thickness ulceration with eschar cap noted to posterolateral right calf. Measures 2.0 cm x 1.1 cm x 0.1. No drainage noted. Wound does not probe to bone, tunnel, or track. No fluctuance, crepitus, or odor noted. Wound appears stable and without signs of infection. Patient provided verbal consent for today's photos.                                   MUSCULOSKELETAL: Muscle strength is 4/5 for all pedal groups tested. Pain with palpation of the right foot at distal aspect of the TMA site. Pain is deep with palpation of distal aspects of lesser metatarsals that feel bulbous in nature. Ankle joint ROM is decreased in dorsiflexion with the knee extended. No obvious biomechanical abnormalities.         ASSESSMENT  1. Hyperkeratotic tissue, sub 1st metatarsal head Left - Carpenter 0  2. Partial thickness ulceration, sub cuboid Right 2/2 friction - Carpenter 1  3. Partial thickness ulceration, posterolateral right calf, NPUAP 1  4. Onychomycosis x 4, Left  5. Diabetes Mellitus Type II uncontrolled with peripheral neuropathy  6. History of non-compliance with weightbearing status and dressing changes  7. Foot pain, right     PLAN  -Evaluation and management x 30 minutes and greater than 50% of the time spent explaining the etiology and treatment with the patient.   Kevin Fiddler excisional debridement of hyperkeratotic tissue to the left foot sub-first metatarsal head with a #15 blade down to epidermis, no underlying ulceration noted. Less than 10 cm² of tissue debrided. Patient tolerated the procedure well.  Kevin Fiddler excisional debridement of hyperkeratotic tissue to the right foot sub-cuboid with a #15 blade down to epidermis with partial thickness ulceration noted. Hemostasis achieved with direct pressure. Less than 10 cm² of tissue debrided. Patient tolerated the procedure well. -Right lower extremity dressing: betadine to foot wound & calf wound, DSD, and Ace bandage. Patient to apply Modesta at home with dressing changes to foot and continue betadine wet to dry on calf. -Left lower extremity dressing: Ace bandage  -Instructed patient to have Xray left foot to assess right foot pain. Patient refused for now. Xray ordered in case patient changes mind.  -Patient instructed to be continued to be strict nonweightbearing to Right lower extremity in CAM boot, instructed patient to wear supportive shoe and insert and weightbear as tolerated to the left.  If she notices new areas of rubbing or wounds she is to wear her CAM boot.  Patient and daughter understood  -Instructed patient to continue to perform dressing changes every other day to Right LE consisting of the dressings as seen above. Patient is to wear compression to the Left LE daily, instructed to remove the dressing nightly and check for new areas of rubbing or wounds. Patient and daughter understood.  -Catalina Gaines patient to elevate both lower extremities as much as possible to aid in edema control.  -Instructed patient on proper diabetic foot education including but not limited to: Having a tight glycemic index whether through food intake or exercise, checking blood sugars daily, inspecting feet daily, and never walking barefoot.  -Instructed patient if she notes signs of infection, including but not limited to - increased redness/streaking up the leg, purulent drainage, pain out of proportion, fevers, chills, or shortness of breath to call Dr. Liz Godwin office or go to the nearest emergency department for evaluation and treatment.  Patient and daughter understood.  -Patient is to return to Dr. Liz Godwin private office in 1 week for wound re-evaluation     Claryce Mcardle, DPM  Podiatric Resident, PGY-1  Pager #: [phone] or [place]

## 2020-08-27 RX ORDER — ESCITALOPRAM OXALATE 10 MG/1
10 TABLET ORAL DAILY
Qty: 30 TABLET | Refills: 1 | Status: SHIPPED | OUTPATIENT
Start: 2020-08-27 | End: 2020-09-18 | Stop reason: SDUPTHER

## 2020-08-31 ENCOUNTER — CLINICAL DOCUMENTATION (OUTPATIENT)
Dept: PODIATRY | Age: 55
End: 2020-08-31

## 2020-08-31 ENCOUNTER — HOSPITAL ENCOUNTER (OUTPATIENT)
Dept: GENERAL RADIOLOGY | Age: 55
Discharge: HOME | End: 2020-08-31
Payer: MEDICARE

## 2020-08-31 ENCOUNTER — HOSPITAL ENCOUNTER (OUTPATIENT)
Age: 55
Discharge: HOME | End: 2020-08-31
Payer: MEDICARE

## 2020-08-31 PROCEDURE — 73590 X-RAY EXAM OF LOWER LEG: CPT

## 2020-08-31 RX ORDER — AMOXICILLIN AND CLAVULANATE POTASSIUM 875; 125 MG/1; MG/1
1 TABLET, FILM COATED ORAL 2 TIMES DAILY
Qty: 20 TABLET | Refills: 0 | Status: SHIPPED | OUTPATIENT
Start: 2020-08-31 | End: 2020-09-01

## 2020-08-31 NOTE — PROGRESS NOTES
Department of Podiatry  Resident Progress Note    Alysia Tati  Allergies: Sulfa antibiotics    SUBJECTIVE  The patient is a 60 y.o. female who presents to Dr. Ronaldo Tompkins private clinic location for follow up evaluation of wounds to bilateral lower extremities. Patient reports her dressings have been changed daily by her daughter as instructed. Patient states she has been wearing here surgical shoe to her right foot, but it does not fit well and she sprained her foot because of that. States her pain is located over the top of her right foot. Patient denies nausea, vomiting, chills, shortness of breath, chest pain, calf pain, cold or flu like symptoms.  Patient has no other pedal complaints during today's evaluation. Past Medical History:        Diagnosis Date    Amenorrhea     Anomalies of nails     Asthma 5/14/04    Athlete's foot 8/2010    Bacterial vaginosis 04/2008    Carpal tunnel syndrome 5/2007    [facility] obstructive [facility] disease[facility]     Diabetes mellitus type II 08/2007    Diabetic neuropathy ([facility] 8/10    DVT (deep venous thrombosis) ([facility] 3/2004    Dyslipidemia 5/2009    Dyspareunia 05/2009    ETOH abuse 3/04/2007    Feet clawing     HTN (hypertension)     Hx of blood clots     Hyperlipidemia     MRSA (methicillin resistant staph aureus) culture positive 11/06/2017; 11/17/2017    foot; leg     Neuropathy 05/2009    polyneuropathy    Pain, back 04/2008    Pain, eye, right 5/14/04    Pancreatitis 5/14/04    Pseudocyst, pancreas 5/14/04    Scalp lesion 08/2007    Tinea pedis     Tobacco abuse 03/2008    Vaginal bleeding, abnormal 6/2007    Wears dentures        Review of Systems: A 12 point review of systems is unremarkable with the exception of the chief complaint. Patient specifically denies nausea, vomiting, fever, chills, shortness of breath, chest pain, abdominal pain, constipation, or difficulty urinating.        OBJECTIVE  Patient presents to clinic with assistance of a wheelchair and accompanied by her daughter. She is wearing post-op shoe to RLE & CAM boot to LLE.     VASCULAR: DP and PT pulses are palpable 2/4 b/l. CFT is brisk to the right TMA distal stump site RLE and remaning digits on the left foot. Skin temperature warm to cool from proximal to distal with no focal calor b/l LE.  Mild diffuse non-pitting edema noted to b/l LE.     NEUROLOGIC: Protective sensation is diminished at all pedal sites b/l. Gross and epicritic sensation is diminished b/l.      DERMATOLOGIC: Dermatological changes noted B/L LE. Left foot toenails 2-5 within normal length, but discolored yellow and thickend. Webspaces left foot 2-4 clean dry, and intact.  Diffuse flaky/xerotic tissue noted to B/L LE.      #1 Hyperkeratotic tissue noted to the plantar aspect sub-first metatarsal, head left foot.      #2 Full thickness ulceration right foot plantar aspect near cuboid region that measures approximately 4.5 cm x 3.2 cm x 0.1 cm  Wound base pink dermal tissue with slight fibrotic tissue with heavily macerated borders. Wound does not probe to bone, tunnel, or track.  No fluctuance, crepitus, or drainage noted. No malodor noted from wound.  Wound appears stable and without acute signs of infection or periwound erythema.     #3 Full thickness ulceration with eschar cap noted to posterolateral right calf. Measures 2.0 cm x 1.5 cm x 1.0. No drainage noted. Wound does not probe to bone, tunnel, or track. No fluctuance, crepitus, or odor noted. Surrounding erythema and slight malodor noted. Images from 8/31. Verbal consent obtained for all images taken today.                     MUSCULOSKELETAL: Muscle strength is 4/5 for all pedal groups tested. Pain with palpation of the right foot at distal aspect of the TMA site. Ankle joint ROM is decreased in dorsiflexion with the knee extended. No obvious biomechanical abnormalities.         ASSESSMENT  1.  Hyperkeratotic tissue, sub 1st metatarsal head Left - Carpenter 0  2. Full thickness ulceration, sub cuboid Right 2/2 friction- Carpenter 1  3. Full thickness ulceration, posterolateral right calf, NPUAP 4  4. Onychomycosis x 4, Left  5. Diabetes Mellitus Type II uncontrolled with peripheral neuropathy  6. History of non-compliance with weightbearing status and dressing changes  7. Foot pain, right     PLAN  -Evaluation and management x 30 minutes and greater than 50% of the time spent explaining the etiology and treatment with the patient.   Lella Marcial excisional debridement of hyperkeratotic tissue to the left foot sub-first metatarsal head with a #15 blade down to epidermis, no underlying ulceration noted. Less than 10 cm² of tissue debrided. Patient tolerated the procedure well.  Lella Marcial excisional debridement of hyperkeratotic tissue to the right foot sub-cuboid with a #15 blade down to epidermis with partial thickness ulceration noted. Hemostasis achieved with direct pressure. Less than 10 cm² of tissue debrided. Patient tolerated the procedure well. -Right lower extremity dressing: betadine to foot wound & calf wound, DSD, and Ace bandage. Patient to apply Modesta at home with dressing changes to foot and continue betadine wet to dry on calf. -Left lower extremity dressing: Ace bandage  -Augmentin 875mg [id number] ordered, concern for infection of right calf wound  -Right tibia fibula xray ordered- impression noted above- unremarkable  -Instructed patient to have Xray right foot to assess right foot pain.  -Patient instructed to be continued to be strict nonweightbearing to Right lower extremity in CAM boot, instructed patient to wear supportive shoe and insert and weightbear as tolerated to the left. If she notices new areas of rubbing or wounds she is to wear her CAM boot.  Patient and daughter understood  -Instructed patient to continue to perform dressing changes every other day to Right LE consisting of the dressings as seen above.  Patient is to wear compression to the Left LE daily, instructed to remove the dressing nightly and check for new areas of rubbing or wounds. Patient and daughter understood.  -Catalina Gaines patient to elevate both lower extremities as much as possible to aid in edema control.  -Instructed patient on proper diabetic foot education including but not limited to: Having a tight glycemic index whether through food intake or exercise, checking blood sugars daily, inspecting feet daily, and never walking barefoot.  -Instructed patient if she notes signs of infection, including but not limited to - increased redness/streaking up the leg, purulent drainage, pain out of proportion, fevers, chills, or shortness of breath to call Dr. Liz Godwin office or go to the nearest emergency department for evaluation and treatment.  Patient and daughter understood.  -Patient is to return to [facility] outpatient podiatry clinic in 2 weeks for wound re-evaluation     Sumit Garduno DPM  Podiatric Resident, PGY-1  Pager #: [phone] or [employer]

## 2020-09-01 ENCOUNTER — TELEPHONE (OUTPATIENT)
Dept: INTERNAL MEDICINE CLINIC | Age: 55
End: 2020-09-01

## 2020-09-01 RX ORDER — AMOXICILLIN AND CLAVULANATE POTASSIUM 875; 125 MG/1; MG/1
1 TABLET, FILM COATED ORAL 2 TIMES DAILY
Qty: 20 TABLET | Refills: 0 | Status: SHIPPED | OUTPATIENT
Start: 2020-09-01 | End: 2020-09-11

## 2020-09-01 NOTE — TELEPHONE ENCOUNTER
Returned call to patient left message to call clinic. However Dr. Jammie Amador ordered the medication.

## 2020-09-14 ENCOUNTER — OFFICE VISIT (OUTPATIENT)
Dept: INTERNAL MEDICINE CLINIC | Age: 55
End: 2020-09-14
Payer: MEDICARE

## 2020-09-14 PROCEDURE — 99213 OFFICE O/P EST LOW 20 MIN: CPT | Performed by: PODIATRIST

## 2020-09-14 PROCEDURE — 11042 DBRDMT SUBQ TIS 1ST 20SQCM/<: CPT

## 2020-09-14 PROCEDURE — 29405 APPL SHORT LEG CAST: CPT

## 2020-09-14 PROCEDURE — 11000 DBRDMT ECZ/INFECTED SKIN<10%: CPT

## 2020-09-14 NOTE — PROGRESS NOTES
today.                         MUSCULOSKELETAL: Muscle strength is 4/5 for all pedal groups tested. Pain with palpation of the right foot at distal aspect of the TMA site. Ankle joint ROM is decreased in dorsiflexion with the knee extended. No obvious biomechanical abnormalities.          ASSESSMENT  1. Fissure, sub 1st metatarsal head Left - Carpenter 0  2. Full thickness ulceration, sub cuboid Right 2/2 friction- Carpenter 1  3. Full thickness ulceration, posterolateral right calf, NPUAP 4  4. Onychomycosis x 4, Left  5. Diabetes Mellitus Type II uncontrolled with peripheral neuropathy  6. History of non-compliance with weightbearing status and dressing changes  7. Foot pain, right    PLAN  -Evaluation and management x 30 minutes and greater than 50% of the time spent explaining the etiology and treatment with the patient.   Phyliss Goon excisional debridement of nonviable and necrotic tissue to the right foot sub-cuboid with a #15 blade down to subcutaneous tissue.  Hemostasis achieved with direct pressure.  Less than 10 cm² of tissue debrided. Patient tolerated the procedure well. -Right lower extremity dressing: betadine to foot wound & calf wound, DSD, and Ace bandage.  Patient to apply Modesta at home with dressing changes to foot and continue betadine wet to dry on calf. -Left lower extremity dressing: off-loading felt pad, gauze, kerlix, and ACE. -Dispensed new CAM boot to the left lower extremity.  -Patient instructed to be continued to be strict nonweightbearing to bilateral lower extremities in CAM boots.  Patient and daughter understood  -Instructed patient to continue to perform dressing changes every other day b/l LE consisting of the dressings as seen above.  Patient and daughter understood.  -Betzaida Proper patient to elevate both lower extremities as much as possible to aid in edema control.  -Instructed patient on proper diabetic foot education including but not limited to: Having a tight glycemic index whether through food intake or exercise, checking blood sugars daily, inspecting feet daily, and never walking barefoot.  -Instructed patient if she notes signs of infection, including but not limited to -increased redness/streaking up the leg, purulent drainage, pain out of proportion, fevers, chills, or shortness of breath to call [facility] outpatient podiatry clinic or go to the nearest emergency department for evaluation and treatment. Patient and daughter understood.  -Discussed surgical treatment of incision and drainage with graft. Patient elected for this treatment option. Patient to follow up with Medicine clinic on Friday for H&P and surgical clearance.  Surgery tentatively scheduled for Monday 9/21.   -Patient is to return to [facility] outpatient podiatry clinic in 2 weeks.      Jeancarlos Benson DPM  Podiatric Resident, PGY-1  Pager #: [phone] or Yissel Dumas

## 2020-09-18 ENCOUNTER — OFFICE VISIT (OUTPATIENT)
Dept: INTERNAL MEDICINE CLINIC | Age: 55
End: 2020-09-18
Payer: MEDICARE

## 2020-09-18 VITALS
SYSTOLIC BLOOD PRESSURE: 181 MMHG | HEART RATE: 93 BPM | DIASTOLIC BLOOD PRESSURE: 83 MMHG | TEMPERATURE: 97.1 F | BODY MASS INDEX: 38.35 KG/M2 | HEIGHT: 62 IN | OXYGEN SATURATION: 99 % | WEIGHT: 208.4 LBS

## 2020-09-18 PROBLEM — E11.621 DIABETIC ULCER OF LEFT FOOT ASSOCIATED WITH TYPE 2 DIABETES MELLITUS, LIMITED TO BREAKDOWN OF SKIN (HCC): Status: RESOLVED | Noted: 2019-03-29 | Resolved: 2020-09-18

## 2020-09-18 PROBLEM — A41.9 SEPSIS (HCC): Status: RESOLVED | Noted: 2019-06-09 | Resolved: 2020-09-18

## 2020-09-18 PROBLEM — I50.22 CHRONIC SYSTOLIC HEART FAILURE (HCC): Status: ACTIVE | Noted: 2020-09-18

## 2020-09-18 PROBLEM — E11.42 DIABETIC POLYNEUROPATHY ASSOCIATED WITH TYPE 2 DIABETES MELLITUS (HCC): Status: RESOLVED | Noted: 2019-03-26 | Resolved: 2020-09-18

## 2020-09-18 PROBLEM — Z48.89 POSTOPERATIVE VISIT: Status: RESOLVED | Noted: 2019-10-28 | Resolved: 2020-09-18

## 2020-09-18 PROBLEM — K21.9 GASTROESOPHAGEAL REFLUX DISEASE: Status: ACTIVE | Noted: 2020-09-18

## 2020-09-18 PROBLEM — G89.18 POST-OP PAIN: Status: RESOLVED | Noted: 2017-10-30 | Resolved: 2020-09-18

## 2020-09-18 PROBLEM — Z02.89 PAIN MEDICATION AGREEMENT SIGNED: Status: RESOLVED | Noted: 2017-06-22 | Resolved: 2020-09-18

## 2020-09-18 PROBLEM — L97.521 DIABETIC ULCER OF LEFT FOOT ASSOCIATED WITH TYPE 2 DIABETES MELLITUS, LIMITED TO BREAKDOWN OF SKIN (HCC): Status: RESOLVED | Noted: 2019-03-29 | Resolved: 2020-09-18

## 2020-09-18 LAB
GLUCOSE BLD-MCNC: 179 MG/DL (ref 70–99)
HBA1C MFR BLD: 8.2 %
PERFORMED ON: ABNORMAL

## 2020-09-18 PROCEDURE — 99213 OFFICE O/P EST LOW 20 MIN: CPT | Performed by: STUDENT IN AN ORGANIZED HEALTH CARE EDUCATION/TRAINING PROGRAM

## 2020-09-18 PROCEDURE — 93005 ELECTROCARDIOGRAM TRACING: CPT | Performed by: STUDENT IN AN ORGANIZED HEALTH CARE EDUCATION/TRAINING PROGRAM

## 2020-09-18 RX ORDER — GABAPENTIN 400 MG/1
400 CAPSULE ORAL 3 TIMES DAILY
Qty: 90 CAPSULE | Refills: 2 | Status: SHIPPED | OUTPATIENT
Start: 2020-09-18 | End: 2020-10-06 | Stop reason: SDUPTHER

## 2020-09-18 RX ORDER — EMPAGLIFLOZIN 10 MG/1
1 TABLET, FILM COATED ORAL DAILY
Qty: 90 TABLET | Refills: 1 | Status: SHIPPED | OUTPATIENT
Start: 2020-09-18 | End: 2020-10-06 | Stop reason: ALTCHOICE

## 2020-09-18 RX ORDER — NYSTATIN 100000 [USP'U]/G
POWDER TOPICAL 2 TIMES DAILY
Qty: 30 G | Refills: 3 | Status: SHIPPED | OUTPATIENT
Start: 2020-09-18

## 2020-09-18 RX ORDER — LOSARTAN POTASSIUM 25 MG/1
25 TABLET ORAL DAILY
Qty: 90 TABLET | Refills: 1 | Status: SHIPPED | OUTPATIENT
Start: 2020-09-18 | End: 2021-01-01 | Stop reason: ALTCHOICE

## 2020-09-18 RX ORDER — AMMONIUM LACTATE 12 G/100G
LOTION TOPICAL
Qty: 1 BOTTLE | Refills: 0 | Status: SHIPPED | OUTPATIENT
Start: 2020-09-18 | End: 2021-01-01 | Stop reason: SDUPTHER

## 2020-09-18 RX ORDER — LANCETS 30 GAUGE
1 EACH MISCELLANEOUS 2 TIMES DAILY
Qty: 100 EACH | Refills: 5 | Status: SHIPPED | OUTPATIENT
Start: 2020-09-18

## 2020-09-18 RX ORDER — APIXABAN 5 MG/1
TABLET, FILM COATED ORAL
Qty: 60 TABLET | Refills: 2 | Status: SHIPPED | OUTPATIENT
Start: 2020-09-18 | End: 2021-01-01 | Stop reason: SDUPTHER

## 2020-09-18 RX ORDER — METOPROLOL SUCCINATE 50 MG/1
TABLET, EXTENDED RELEASE ORAL
Qty: 30 TABLET | Refills: 1 | Status: SHIPPED | OUTPATIENT
Start: 2020-09-18 | End: 2020-10-08 | Stop reason: SDUPTHER

## 2020-09-18 RX ORDER — FUROSEMIDE 40 MG/1
40 TABLET ORAL DAILY
Qty: 30 TABLET | Refills: 2 | Status: SHIPPED | OUTPATIENT
Start: 2020-09-18 | End: 2020-10-06 | Stop reason: SDUPTHER

## 2020-09-18 RX ORDER — METFORMIN HYDROCHLORIDE 500 MG/1
TABLET, EXTENDED RELEASE ORAL
Qty: 90 TABLET | Refills: 3 | Status: SHIPPED | OUTPATIENT
Start: 2020-09-18 | End: 2020-10-06 | Stop reason: ALTCHOICE

## 2020-09-18 RX ORDER — ESCITALOPRAM OXALATE 10 MG/1
10 TABLET ORAL DAILY
Qty: 30 TABLET | Refills: 1 | Status: SHIPPED | OUTPATIENT
Start: 2020-09-18 | End: 2021-01-01 | Stop reason: SDUPTHER

## 2020-09-18 RX ORDER — INSULIN GLARGINE 100 [IU]/ML
50 INJECTION, SOLUTION SUBCUTANEOUS DAILY
Qty: 5 PEN | Refills: 5 | Status: SHIPPED | OUTPATIENT
Start: 2020-09-18 | End: 2020-01-01 | Stop reason: SDUPTHER

## 2020-09-18 RX ORDER — CALCIUM CITRATE/VITAMIN D3 200MG-6.25
1 TABLET ORAL 2 TIMES DAILY
Qty: 200 EACH | Refills: 5 | Status: SHIPPED | OUTPATIENT
Start: 2020-09-18 | End: 2021-01-01

## 2020-09-18 RX ORDER — ATORVASTATIN CALCIUM 20 MG/1
20 TABLET, FILM COATED ORAL NIGHTLY
Qty: 30 TABLET | Refills: 1 | Status: SHIPPED | OUTPATIENT
Start: 2020-09-18 | End: 2020-11-05 | Stop reason: SDUPTHER

## 2020-09-18 RX ORDER — ASPIRIN 81 MG/1
81 TABLET ORAL DAILY
Qty: 60 TABLET | Refills: 3 | Status: ON HOLD | OUTPATIENT
Start: 2020-09-18 | End: 2021-01-01 | Stop reason: HOSPADM

## 2020-09-18 RX ORDER — OMEPRAZOLE 20 MG/1
20 CAPSULE, DELAYED RELEASE ORAL EVERY MORNING
Qty: 30 CAPSULE | Refills: 2 | Status: SHIPPED | OUTPATIENT
Start: 2020-09-18 | End: 2020-01-01 | Stop reason: SDUPTHER

## 2020-09-18 RX ORDER — AMITRIPTYLINE HYDROCHLORIDE 100 MG/1
TABLET, FILM COATED ORAL
Qty: 30 TABLET | Refills: 2 | Status: SHIPPED | OUTPATIENT
Start: 2020-09-18 | End: 2021-01-01 | Stop reason: SDUPTHER

## 2020-09-18 RX ORDER — LANCETS
1 EACH MISCELLANEOUS 2 TIMES DAILY
Qty: 200 EACH | Refills: 5 | Status: SHIPPED | OUTPATIENT
Start: 2020-09-18 | End: 2021-01-01 | Stop reason: SDUPTHER

## 2020-09-18 ASSESSMENT — PATIENT HEALTH QUESTIONNAIRE - PHQ9
2. FEELING DOWN, DEPRESSED OR HOPELESS: 1
SUM OF ALL RESPONSES TO PHQ QUESTIONS 1-9: 1
1. LITTLE INTEREST OR PLEASURE IN DOING THINGS: 0
SUM OF ALL RESPONSES TO PHQ9 QUESTIONS 1 & 2: 1
SUM OF ALL RESPONSES TO PHQ QUESTIONS 1-9: 1

## 2020-09-18 NOTE — PROGRESS NOTES
groin area BID Yes Eunice Mayorga MD   atorvastatin (LIPITOR) 20 MG tablet Take 1 tablet by mouth nightly Yes Eunice Mayorga MD   albuterol (PROVENTIL) (5 MG/ML) 0.5% nebulizer solution Take 1 mL by nebulization 4 times daily as needed for Wheezing  Patient taking differently: Take 5 mg by nebulization 4 times daily as needed for Wheezing  Yes Anuj Rivas MD   aspirin EC 81 MG EC tablet Take 1 tablet by mouth daily Yes Francisco Epperson MD   METHADONE HCL PO Take 140 mg by mouth Take whole bottle every AM  Comes from methadone clinic Yes Historical Provider, MD   ammonium lactate (LAC-HYDRIN) 12 % lotion Apply topically daily. Yes Danika Moreno DPM   Lancets MISC 1 each by Does not apply route 2 times daily PHARMACY MAY SUBSTITUTE TO TRUE METRIX LANCETS Yes Stacy Salinas MD   Gauze Pads & Dressings MISC Please dispense 4x8 guaze, kerlix, and ace Yes Earl Mendoza DPM   gabapentin (NEURONTIN) 400 MG capsule Take 1 capsule by mouth 3 times daily for 30 days. Sylwia Fuentes MD   blood glucose test strips (TRUE METRIX BLOOD GLUCOSE TEST) strip 1 each by In Vitro route 2 times daily As needed.   Eunice Mayorga MD   Accu-Chek Softclix Lancets MISC 1 strip by Does not apply route 2 times daily Check before breakfast and before going to bed  Francisco Epperson MD        Social History     Tobacco Use    Smoking status: Former Smoker     Packs/day: 1.00     Years: 30.00     Pack years: 30.00     Types: Cigarettes     Last attempt to quit: 2018     Years since quittin.3    Smokeless tobacco: Never Used    Tobacco comment: 1-2 cig/day   Substance Use Topics    Alcohol use: No     Alcohol/week: 4.0 - 5.0 standard drinks     Types: 4 - 5 Standard drinks or equivalent per week     Comment: hx of etoh abuse, denies recent etoh use        Vitals:    20 1319 20 1328   BP: (!) 188/82 (!) 181/83   Site: Right Upper Arm Right Upper Arm   Position: Sitting Sitting   Cuff Size: Large Adult Large ammonium lactate (LAC-HYDRIN) 12 % lotion; Apply topically daily. Dispense: 1 Bottle; Refill: 0    3. Chronic systolic heart failure (HCC)  - furosemide (LASIX) 40 MG tablet; Take 1 tablet by mouth daily  Dispense: 30 tablet; Refill: 2  - metoprolol succinate (TOPROL XL) 50 MG extended release tablet; TAKE 1 TABLET BY MOUTH EVERY NIGHT  Dispense: 30 tablet; Refill: 1    4. Gastroesophageal reflux disease, esophagitis presence not specified  - omeprazole (PRILOSEC) 20 MG delayed release capsule; Take 1 capsule by mouth every morning  Dispense: 30 capsule; Refill: 2    5. Deep vein thrombosis (DVT) of proximal lower extremity, unspecified chronicity, unspecified laterality (HCC)  - ELIQUIS 5 MG TABS tablet; TK 1 T PO  BID  Dispense: 60 tablet; Refill: 2    6. Dyslipidemia  - atorvastatin (LIPITOR) 20 MG tablet; Take 1 tablet by mouth nightly  Dispense: 30 tablet; Refill: 1    7. Mild intermittent asthma, unspecified whether complicated  - Albuterol inhaler     8. Diabetic foot infection ([facility]  - nystatin (MYCOSTATIN) 602264 UNIT/GM powder; Apply topically 2 times daily Apply to right breast and groin area BID  Dispense: 30 g; Refill: 3    9. CKD (chronic kidney disease), stage III ([address]  - Follow up Renal Panel    10. Type 2 diabetes mellitus with stage 3 chronic kidney disease, with long-term current use of insulin ([facility])    - POCT glycosylated hemoglobin (Hb A1C)  - COMPREHENSIVE METABOLIC PANEL; Future  - metFORMIN (GLUCOPHAGE-XR) 500 MG extended release tablet; TAKE 1 TABLET BY MOUTH TWICE DAILY  Dispense: 90 tablet; Refill: 3  - empagliflozin (JARDIANCE) 10 MG tablet; Take 1 tablet by mouth daily  Dispense: 90 tablet; Refill: 1    11. Pre-op evaluation  - Start Losartan for Blood pressure control   - Stop Eliquis 3 days before surgery   - continue Other medication   - COMPREHENSIVE METABOLIC PANEL; Future  - CBC WITH AUTO DIFFERENTIAL; Future    12.  Essential hypertension  - Poor Control   - Start losartan (COZAAR) 25 MG tablet; Take 1 tablet by mouth daily  Dispense: 90 tablet; Refill: 1    13. DM type 2, uncontrolled, with lower extremity ulcer ([address]  - gabapentin (NEURONTIN) 400 MG capsule; Take 1 capsule by mouth 3 times daily for 30 days. Dispense: 90 capsule; Refill: 2  - metFORMIN (GLUCOPHAGE-XR) 500 MG extended release tablet; TAKE 1 TABLET BY MOUTH TWICE DAILY  Dispense: 90 tablet; Refill: 3  - aspirin EC 81 MG EC tablet; Take 1 tablet by mouth daily  Dispense: 60 tablet; Refill: 3  - insulin lispro (HUMALOG) 100 UNIT/ML injection vial; Inject 8 Units into the skin 3 times daily (with meals)  Dispense: 720 Units; Refill: 0  - insulin glargine (BASAGLAR KWIKPEN) 100 UNIT/ML injection pen; Inject 50 Units into the skin daily  Dispense: 5 pen; Refill: 5  - Accu-Chek Softclix Lancets MISC; 1 strip by Does not apply route 2 times daily Check before breakfast and before going to bed  Dispense: 200 each; Refill: 5  - blood glucose test strips (TRUE METRIX BLOOD GLUCOSE TEST) strip; 1 each by In Vitro route 2 times daily As needed. Dispense: 200 each; Refill: 5  - Insulin Pen Needle 31G X 5 MM MISC; 1 each by Does not apply route daily  Dispense: 100 each; Refill: 5  - Lancets MISC; 1 each by Does not apply route 2 times daily PHARMACY MAY SUBSTITUTE TO TRUE METRIX LANCETS  Dispense: 100 each; Refill: 5    14. Type 2 diabetes mellitus with diabetic polyneuropathy, with long-term current use of insulin ([facility])  - amitriptyline (ELAVIL) 100 MG tablet; TAKE 1 TABLET BY MOUTH EVERY NIGHT AT BEDTIME  Dispense: 30 tablet; Refill: 2    15. Heart failure with preserved ejection fraction ([facility])  - Start ARB   - furosemide (LASIX) 40 MG tablet; Take 1 tablet by mouth daily  Dispense: 30 tablet; Refill: 2    16.  Congestive heart failure, unspecified HF chronicity, unspecified heart failure type ([facility])  - metoprolol succinate (TOPROL XL) 50 MG extended release tablet; TAKE 1 TABLET BY MOUTH EVERY NIGHT  Dispense: 30 tablet; Refill: 1  - Lasix continue   - ARB     17. Type 2 diabetes mellitus with diabetic polyneuropathy (HCC)  - Gabapentin     18. Simple chronic bronchitis (HCC)  - albuterol (PROVENTIL) (5 MG/ML) 0.5% nebulizer solution; Take 1 mL by nebulization 4 times daily as needed for Wheezing  Dispense: 120 each; Refill: 3    19. Pre-op exam  - EKG 12 Lead shows no sign of ischemia   - CMP   - CBC   - Blood pressure Control   - No chest pain, shortness of breath    **OKAY FOR SURGERY. Return in about 3 months (around 12/18/2020). An electronic signature was used to authenticate this note. --Desean Norman MD on 9/18/2020 at 1:48 PM     Addendum to Resident H& P/Progress note:  I have personally seen,examined and evaluated the patient.  I have reviewed the current history, physical findings, labs and assessment and plan and agree with note as documented by resident MD ( Mere Cota)      Nevaeh Bravo MD, [address]

## 2020-09-21 ENCOUNTER — TELEPHONE (OUTPATIENT)
Dept: INTERNAL MEDICINE CLINIC | Age: 55
End: 2020-09-21

## 2020-09-28 ENCOUNTER — OFFICE VISIT (OUTPATIENT)
Dept: INTERNAL MEDICINE CLINIC | Age: 55
End: 2020-09-28
Payer: MEDICARE

## 2020-09-28 ENCOUNTER — TELEPHONE (OUTPATIENT)
Dept: INTERNAL MEDICINE CLINIC | Age: 55
End: 2020-09-28

## 2020-09-28 PROCEDURE — 11000 DBRDMT ECZ/INFECTED SKIN<10%: CPT

## 2020-09-28 PROCEDURE — 99213 OFFICE O/P EST LOW 20 MIN: CPT | Performed by: PODIATRIST

## 2020-09-28 NOTE — PROGRESS NOTES
Department of Podiatry  Resident Progress Note     Coy Sheppard  Allergies: Sulfa antibiotics     SUBJECTIVE  The patient is a 59 y. o. female who presents to [facility] for follow up evaluation of wounds to bilateral lower extremities. Patient reports getting her H&P done on 9/18. She reports having blood drawn for lab tests at this time however received a call later following her visit that there was an error with her labs and needs to be redrawn. Patient reports her dressings have been changed daily by her daughter as instructed. Patient reports being nonweightbearing to the best of her ability. Reports her blood sugars have been well controlled these last toe weeks in the mid to upper 100's.  Patient denies nausea, vomiting, chills, shortness of breath, chest pain, calf pain, cold or flu like symptoms.  Patient has no other pedal complaints during today's evaluation.     Past Medical History:    Past Medical History             Diagnosis Date    Amenorrhea      Anomalies of nails      Asthma 5/14/04    Athlete's foot 8/2010    Bacterial vaginosis 04/2008    Carpal tunnel syndrome 5/2007    [facility] obstructive [facility]) [address] [address]      Diabetes mellitus type II 08/2007    Diabetic neuropathy ([facility] [address] 8/10    DVT (deep venous thrombosis) ([facility] [address] 3/2004    Dyslipidemia 5/2009    Dyspareunia 05/2009    ETOH abuse 3/04/2007    Feet clawing      HTN (hypertension)      Hx of blood clots      Hyperlipidemia      MRSA (methicillin resistant staph aureus) culture positive 11/06/2017; 11/17/2017     foot; leg     Neuropathy 05/2009     polyneuropathy    Pain, back 04/2008    Pain, eye, right 5/14/04    Pancreatitis 5/14/04    Pseudocyst, pancreas 5/14/04    Scalp lesion 08/2007    Tinea pedis      Tobacco abuse 03/2008    Vaginal bleeding, abnormal 6/2007    Wears dentures             Review of Systems: A 12 point review of systems is unremarkable with the exception of the chief complaint. Patient specifically denies nausea, vomiting, fever, chills, shortness of breath, chest pain, abdominal pain, constipation, or difficulty urinating.      OBJECTIVE  Patient presents to clinic with assistance of a wheelchair and accompanied by her daughter. She is wearing post-op shoe to RLE & CAM boot to LLE.     VASCULAR: DP and PT pulses are palpable 2/4 b/l. CFT is brisk to the right TMA distal stump site RLE and remaning digits on the left foot. Skin temperature warm to cool from proximal to distal with no focal calor b/l LE. Mild diffuse non-pitting edema noted to b/l LE.     NEUROLOGIC: Protective sensation is diminished at all pedal sites b/l. Gross and epicritic sensation is diminished b/l.      DERMATOLOGIC: Dermatological changes noted B/L LE. Left foot toenails 2-5 within normal length, but discolored yellow and thickend. Webspaces left foot 2-4 clean dry, and intact. Diffuse flaky/xerotic tissue noted to B/L LE.      #1 Hyperkeratotic tissue noted to the plantar aspect of the first metatarsal head of the left foot. After debridement of the hyperkeratotic tissue, fissure noted to the left plantar aspect of left foot first metatarsal head with macerated edges. Fissure measures 0.5 cm x 0.2 cm. No drainage or malodor. No acute signs of infection.      #2 Full thickness ulceration right foot plantar aspect near cuboid region that measures approximately 4.0 cm x 2.4 cm x 0.1 cm  Wound base pink dermal tissue with slight fibrotic tissue with heavily macerated borders. Wound does not probe to bone, tunnel, or track. No fluctuance, crepitus, or drainage noted. No malodor noted from wound.  Wound appears stable and without acute signs of infection or periwound erythema.     #3 Full thickness ulceration with fibrotic base noted to posterolateral right calf.  Measures 1.7 cm x 1. cm x 0.1cm. No drainage noted. Wound does not probe to bone, tunnel, or track.  No fluctuance, crepitus, or odor noted. Surrounding erythema and slight malodor noted.     Images from 9/28. Verbal consent obtained for all images taken today.                 MUSCULOSKELETAL: Muscle strength is 4/5 for all pedal groups tested. Pain with palpation of the right foot at distal aspect of the TMA site. Ankle joint ROM is decreased in dorsiflexion with the knee extended.         ASSESSMENT  1. Fissure, sub 1st metatarsal head Left - Carpenter 0  2. Full thickness ulceration, sub cuboid Right 2/2 friction- Carpenter 1  3. Full thickness ulceration, posterolateral right calf, NPUAP 4  4. Onychomycosis x 4, Left  5. Diabetes Mellitus Type II uncontrolled with peripheral neuropathy  6. History of non-compliance with weightbearing status and dressing changes  7. Foot pain, right    PLAN  -Evaluation and management x 30 minutes and greater than 50% of the time spent explaining the etiology and treatment with the patient.   Onita  excisional debridement of nonviable and necrotic tissue to the right foot sub-cuboid with a #15 blade down to subcutaneous tissue.  Less than 1cc of bleeding noted. Hemostasis achieved with direct pressure.  Less than 10 cm² of tissue debrided. Patient tolerated the procedure well. -Right lower extremity dressing: Modesta to the plantar foot wound, betadine to calf wound, DSD, and Ace bandage. Patient to apply Modesta at home with dressing changes to foot and continue betadine on calf. -Left lower extremity dressing: off-loading felt pad, gauze, kerlix, and ACE. -Dispensed new CAM boot to the left lower extremity.  -Patient instructed to be continued to be strict nonweightbearing to bilateral lower extremities in CAM boots.  Patient and daughter understood  -Instructed patient to continue to perform dressing changes every other day b/l LE consisting of the dressings as seen above.  Patient and daughter understood.  -Roger Valiente patient to elevate both lower extremities as much as possible to aid in edema control.  -Instructed patient on proper diabetic foot education including but not limited to: Having a tight glycemic index whether through food intake or exercise, checking blood sugars daily, inspecting feet daily, and never walking barefoot.  -Instructed patient if she notes signs of infection, including but not limited to -increased redness/streaking up the leg, purulent drainage, pain out of proportion, fevers, chills, or shortness of breath to call [facility] outpatient podiatry clinic or go to the nearest emergency department for evaluation and treatment. Patient and daughter understood.  -Discussed surgical intervention with patient. All potential risks, benefits, and complications were discussed with the patient prior to the scheduling of surgery. No guarantees or promises were made to what the outcomes will be. The patient wished to proceed with surgery.   -Patient to have labs redrawn to finalize surgical clearance per primary care physician. If cleared for surgery by primary care physician, surgery will tentatively be planned for later this week.    -Patient is to return to [facility] outpatient podiatry clinic 1 week after surgery    Discussed assessment and plan with Dr. Merlynn Jolly, DPM Louella Seip, DPM  Podiatric Resident, PGY-1  Pager #: [phone] or Yissel Dumas

## 2020-09-28 NOTE — PATIENT INSTRUCTIONS
Call to make appointment for 1 week after surgery. Continue dressing changes as previous instructed.

## 2020-09-28 NOTE — TELEPHONE ENCOUNTER
Dr. Lauren Porter can you update the H&P from 9/18/20 to say that patient is okay for intended surgery. It was not documented in chart.  Dr. Larissa Buckner wants to do surgery on Wednesday 9/30/20

## 2020-10-01 ENCOUNTER — NURSE ONLY (OUTPATIENT)
Dept: PRIMARY CARE CLINIC | Age: 55
End: 2020-10-01
Payer: MEDICARE

## 2020-10-01 PROCEDURE — 99211 OFF/OP EST MAY X REQ PHY/QHP: CPT | Performed by: NURSE PRACTITIONER

## 2020-10-02 ENCOUNTER — TELEPHONE (OUTPATIENT)
Dept: INTERNAL MEDICINE CLINIC | Age: 55
End: 2020-10-02

## 2020-10-02 LAB — SARS-COV-2, NAA: NOT DETECTED

## 2020-10-02 RX ORDER — AMLODIPINE BESYLATE 5 MG/1
5 TABLET ORAL DAILY
Qty: 90 TABLET | Refills: 1 | Status: SHIPPED | OUTPATIENT
Start: 2020-10-02 | End: 2020-10-06 | Stop reason: ALTCHOICE

## 2020-10-02 NOTE — TELEPHONE ENCOUNTER
Per Dr. Stacey Bajwa and Dr. Daisha Ortiz her podiatry surgery has been cancelled  She [facility] Ne[facility] Dr[phone]. She is to see Podiatry clinic Monday 10-5-20  Patient notified of the above and verbalized understanding.   Dr. Tanja Fabian to notify  Dr. Jesus Solis

## 2020-10-02 NOTE — TELEPHONE ENCOUNTER
attempted calling numerous times. Would like her to see a nephrologist. Asked her to stop ARB, and lasix. Will put her on amlodipine and have her recheck Renal panel.

## 2020-10-08 RX ORDER — METOPROLOL SUCCINATE 50 MG/1
TABLET, EXTENDED RELEASE ORAL
Qty: 90 TABLET | Refills: 1 | Status: SHIPPED | OUTPATIENT
Start: 2020-10-08 | End: 2021-01-01 | Stop reason: SDUPTHER

## 2020-10-12 ENCOUNTER — OFFICE VISIT (OUTPATIENT)
Dept: INTERNAL MEDICINE CLINIC | Age: 55
End: 2020-10-12
Payer: MEDICARE

## 2020-10-12 PROCEDURE — 11000 DBRDMT ECZ/INFECTED SKIN<10%: CPT

## 2020-10-12 PROCEDURE — 99213 OFFICE O/P EST LOW 20 MIN: CPT | Performed by: PODIATRIST

## 2020-10-12 NOTE — PROGRESS NOTES
Department of Podiatry  Resident Progress Note     Amina Serra  Allergies: Sulfa antibiotics     SUBJECTIVE  The patient is a 59 y. o. female who [facility] for follow up evaluation of wounds to bilateral lower extremities. Patient reports her dressings have been changed daily by her daughter as instructed. Patient reports being nonweightbearing to the best of her ability. Reports her blood sugars have been uncontrolled the last 2 weeks as her medication has not been filled. Patient denies nausea, vomiting, chills, shortness of breath, chest pain, calf pain, cold or flu like symptoms.  Patient has no other pedal complaints during today's evaluation.     Past Medical History:    Past Medical History             Diagnosis Date    Amenorrhea      Anomalies of nails      Asthma 5/14/04    Athlete's foot 8/2010    Bacterial vaginosis 04/2008    Carpal tunnel syndrome 5/2007    COPD (chronic obstructive pulmonary disease) ([facility] [address]      Diabetes mellitus type II 08/2007    Diabetic neuropathy ([facility] [address] 8/10    DVT (deep venous thrombosis) ([facility] [address] 3/2004    Dyslipidemia 5/2009    Dyspareunia 05/2009    ETOH abuse 3/04/2007    Feet clawing      HTN (hypertension)      Hx of blood clots      Hyperlipidemia      MRSA (methicillin resistant staph aureus) culture positive 11/06/2017; 11/17/2017     foot; leg     Neuropathy 05/2009     polyneuropathy    Pain, back 04/2008    Pain, eye, right 5/14/04    Pancreatitis 5/14/04    Pseudocyst, pancreas 5/14/04    Scalp lesion 08/2007    Tinea pedis      Tobacco abuse 03/2008    Vaginal bleeding, abnormal 6/2007    Wears dentures             Review of Systems: A 12 point review of systems is unremarkable with the exception of the chief complaint.  Patient specifically denies nausea, vomiting, fever, chills, shortness of breath, chest pain, abdominal pain, constipation, or difficulty urinating.      OBJECTIVE  Patient presents to clinic with assistance of a wheelchair and accompanied by her daughter. She is wearing a cam boot to bilateral lower extremity.     VASCULAR: DP and PT pulses are palpable 2/4 b/l. CFT is brisk to the right TMA distal stump site RLE and remaning digits on the left foot. Skin temperature warm to cool from proximal to distal with no focal calor b/l LE. Mild diffuse non-pitting edema noted to b/l LE.     NEUROLOGIC: Protective sensation is diminished at all pedal sites b/l. Gross and epicritic sensation is diminished b/l.      DERMATOLOGIC: Dermatological changes noted B/L LE. Left foot toenails 2-5 within normal length, but discolored yellow and thickend. Webspaces left foot 2-4 macerated with interdigital debris, but are intact. Diffuse flaky/xerotic tissue noted to B/L LE.      #1 Hyperkeratotic tissue noted to the plantar aspect of the first metatarsal head of the left foot. After debridement of the hyperkeratotic tissue, fissure noted to the left plantar aspect of left foot first metatarsal head with macerated edges. Fissure measures 0.5 cm x 0.1 cm. No drainage or malodor. No acute signs of infection.      #2 Full thickness ulceration right foot plantar aspect near cuboid region that measures 3.1 cm x 2.3 cm x 0.1 cm  Wound base pink dermal tissue with slight fibrotic tissue with heavily macerated borders. Wound does not probe to bone, tunnel, or track. No fluctuance, crepitus, or drainage noted. No malodor noted from wound.  Wound appears stable and without acute signs of infection or periwound erythema.     #3   Partial thickness ulceration with fibrotic base noted to posterolateral right calf.  Measures 1.0 cm x 1.0 cm x superficial. No drainage noted. Wound does not probe to bone, tunnel, or track. No fluctuance, crepitus, or odor noted. No periwound erythema. Patient provided verbal consent for today's photos.           MUSCULOSKELETAL: Muscle strength is 4/5 for all pedal groups tested.  Pain with palpation of the right foot at distal aspect of the TMA site. Ankle joint ROM is decreased in dorsiflexion with the knee extended.         ASSESSMENT  1. Fissure, sub 1st metatarsal head Left - Carpenter 1  2. Full thickness ulceration, sub cuboid Right 2/2 friction- Carpenter 1  3. Superficial ulceration, posterolateral right calf, NPUAP 2  4. Onychomycosis x 4, Left  5. Diabetes Mellitus Type II uncontrolled with peripheral neuropathy  6. History of non-compliance with weightbearing status and dressing changes  7. Foot pain, right    PLAN  -Evaluation and management x 30 minutes and greater than 50% of the time spent explaining the etiology and treatment with the patient.   Sol Ben excisional debridement of nonviable and necrotic tissue to the right foot sub-cuboid with a #15 blade down to subcutaneous tissue.  Less than 1cc of bleeding noted. Hemostasis achieved with direct pressure.  Less than 20 cm² of tissue debrided. Patient tolerated the procedure well.   Sol Ben excisional debridement of hyperkeratotic lesion noted to sub-first metatarsal head left foot with a #15 blade down to dermal tissue. No bleeding noted. Patient tolerated procedure well. -Right lower extremity dressing: Modesta to the plantar foot wound, betadine to calf wound, DSD, and Ace bandage. Patient to apply Modesta at home with dressing changes to foot and continue betadine on calf. -Left lower extremity dressing: Betadine to webspaces, off-loading felt pad, gauze, kerlix, and ACE. -Instructed patient to apply Betadine to webspaces daily and to dry in between digits well after showering.  -Instructed patient to continue to perform dressing changes every other day b/l LE consisting of the dressings as seen above.  Patient and daughter understood.  -Patient instructed to be continued to be strict nonweightbearing to bilateral lower extremities in CAM boots.  Patient and daughter understood  -Instructed patient to elevate both lower extremities as much as possible to aid in edema control.  -Instructed patient on proper diabetic foot education including but not limited to: Having a tight glycemic index whether through food intake or exercise, checking blood sugars daily, inspecting feet daily, and never walking barefoot.  -Instructed patient if she notes signs of infection, including but not limited to -increased redness/streaking up the leg, purulent drainage, pain out of proportion, fevers, chills, or shortness of breath to call [facility] outpatient podiatry clinic or go to the nearest emergency department for evaluation and treatment.  Patient and daughter understood.  -Patient is to return to [facility] outpatient podiatry clinic in 1 week    Discussed assessment and plan with Dr. Maralee Litten, DPM Fontaine Bread, DPM  Podiatric Resident, PGY-1  Pager #: [phone] or Perfect[employer]erve

## 2020-10-19 ENCOUNTER — OFFICE VISIT (OUTPATIENT)
Dept: INTERNAL MEDICINE CLINIC | Age: 55
End: 2020-10-19
Payer: MEDICARE

## 2020-10-19 PROCEDURE — 11042 DBRDMT SUBQ TIS 1ST 20SQCM/<: CPT

## 2020-10-19 PROCEDURE — 99213 OFFICE O/P EST LOW 20 MIN: CPT | Performed by: PODIATRIST

## 2020-10-19 NOTE — PROGRESS NOTES
Department of Podiatry  Resident Progress Note     Eugenia Reynoso  Allergies: Sulfa antibiotics     SUBJECTIVE  The patient is a 59 y. o. female who presents to [address] [place] [address] outpatient podiatry clinic for follow up evaluation of wounds to bilateral lower extremities. Patient reports her dressings have been changed daily by her daughter as instructed. Patient reports being nonweightbearing to the best of her ability. Patient states she saw her nephrologist who changed some of her medications, and she plans to see him again in the beginning of November. Patient admits to being diabetic, and regularly checks her blood sugar. She reports her most recent value was 180 mg/dL. Patient denies nausea, vomiting, chills, shortness of breath, chest pain, calf pain, cold or flu like symptoms. Patient has no other pedal complaints during today's evaluation.     Past Medical History:    Past Medical History             Diagnosis Date    Amenorrhea      Anomalies of nails      Asthma 5/14/04    Athlete's foot 8/2010    Bacterial vaginosis 04/2008    Carpal tunnel syndrome 5/2007    [facility]ch[facility] obstructive pulmonary disease) ([facility]      Diabetes mellitus type II 08/2007    Diabetic neuropathy ([facility] [address] 8/10    DVT (deep venous thrombosis) ([facility] [address] 3/2004    Dyslipidemia 5/2009    Dyspareunia 05/2009    ETOH abuse 3/04/2007    Feet clawing      HTN (hypertension)      Hx of blood clots      Hyperlipidemia      MRSA (methicillin resistant staph aureus) culture positive 11/06/2017; 11/17/2017     foot; leg     Neuropathy 05/2009     polyneuropathy    Pain, back 04/2008    Pain, eye, right 5/14/04    Pancreatitis 5/14/04    Pseudocyst, pancreas 5/14/04    Scalp lesion 08/2007    Tinea pedis      Tobacco abuse 03/2008    Vaginal bleeding, abnormal 6/2007    Wears dentures             Review of Systems: A 12 point review of systems is unremarkable with the exception of the chief complaint.  Patient specifically denies nausea, extended.         ASSESSMENT  1. Full thickness ulceration, sub cuboid Right 2/2 friction- Carpenter 1  2. Superficial ulceration, posterolateral right calf, NPUAP 2  3. Onychomycosis x 4, Left  4. Macerated webspaces: Left foot  5. Diabetes Mellitus Type II uncontrolled with peripheral neuropathy  6. History of non-compliance with weightbearing status and dressing changes    PLAN  -Evaluation and management x 30 minutes and greater than 50% of the time spent explaining the etiology and treatment with the patient.   Lady Backer excisional debridement of nonviable and necrotic tissue to the right foot sub-cuboid with a #15 blade down to subcutaneous tissue. Less than 1cc of bleeding noted. Hemostasis achieved with direct pressure. Less than 20 cm² of tissue debrided. Patient tolerated the procedure well. -Right lower extremity dressing: Modesta to the plantar foot wound, betadine to calf wound, DSD, and Ace bandage. Patient to apply Modesta at home with dressing changes to foot and continue betadine on calf. -Left lower extremity dressing: Ace bandage with gentle compression up to the knee  -Instructed patient to apply Betadine to webspaces daily and to dry in between digits well after showering.  -Instructed patient to continue to perform dressing changes every other day b/l LE consisting of the dressings as seen above.  Patient and daughter understood.  -Patient instructed to be continued to be strict nonweightbearing to bilateral lower extremities in CAM boots. Patient and daughter understood  -Instructed patient to elevate both lower extremities as much as possible to aid in edema control.  -Instructed patient on proper diabetic foot education including but not limited to: Having a tight glycemic index whether through food intake or exercise, checking blood sugars daily, inspecting feet daily, and never walking barefoot.  -Instructed patient if she notes signs of infection, including but not limited to -increased redness/streaking up the leg, purulent drainage, pain out of proportion, fevers, chills, or shortness of breath to call [facility] outpatient podiatry clinic or go to the nearest emergency department for evaluation and treatment. Patient and daughter understood.  -Patient is still not optimized for surgery given her acute kidney injury. We will continue with local wound care until optimized for surgery.  -Patient is to return to [facility] outpatient podiatry clinic in 1 week.     Discussed assessment and plan with RAMONE Cloud DPM  Podiatric Resident, PGY-1  Pager #: [phone] or Perfect Serve

## 2020-10-19 NOTE — PATIENT INSTRUCTIONS
Return to clinic in 1 week. Non weight bearing to right lower extremity, in CAM boot. Dressing changes to right lower extremity daily with Prizma to plantar foot wound, Betadine to lateral calf wound, dry sterile guaze overlying wound beds, Curlex, and Ace Bandage.

## 2020-10-21 RX ORDER — GABAPENTIN 400 MG/1
400 CAPSULE ORAL 2 TIMES DAILY
Qty: 180 CAPSULE | Refills: 0 | Status: SHIPPED | OUTPATIENT
Start: 2020-10-21 | End: 2021-01-01 | Stop reason: SDUPTHER

## 2020-10-26 ENCOUNTER — OFFICE VISIT (OUTPATIENT)
Dept: INTERNAL MEDICINE CLINIC | Age: 55
End: 2020-10-26
Payer: MEDICARE

## 2020-10-26 PROCEDURE — 99213 OFFICE O/P EST LOW 20 MIN: CPT | Performed by: PODIATRIST

## 2020-10-26 NOTE — PROGRESS NOTES
Department of Podiatry  Resident Progress Note     Richie Benoit  Allergies: Sulfa antibiotics     SUBJECTIVE  The patient is a 59 y. o. female who presents to [facility] outpatient podiatry clinic for follow up evaluation of wounds to bilateral lower extremities. Patient states her daughter has been changing her dressings every other day as instructed. Patient reports compliance with her nonweightbearing status this week. She states she noticed bleeding to the dressing this morning, but otherwise no drainage this past week. Patient notes to being diabetic, and regular checks her blood sugar. Patient is unable to recall her most recent blood sugar value but states they have been well controlled this week. Patient denies nausea, vomiting, chills, shortness of breath, chest pain, calf pain, cold or flu like symptoms. Patient has no other pedal complaints during today's evaluation.     Past Medical History:    Past Medical History             Diagnosis Date    Amenorrhea      Anomalies of nails      Asthma 5/14/04    Athlete's foot 8/2010    Bacterial vaginosis 04/2008    Carpal tunnel syndrome 5/2007    COPD (chronic obstructive pulmonary disease) (HCC)      Diabetes mellitus type II 08/2007    Diabetic neuropathy ([facility] [address] 8/10    DVT (deep venous thrombosis) ([facility] [address] 3/2004    Dyslipidemia 5/2009    Dyspareunia 05/2009    ETOH abuse 3/04/2007    Feet clawing      HTN (hypertension)      Hx of blood clots      Hyperlipidemia      MRSA (methicillin resistant staph aureus) culture positive 11/06/2017; 11/17/2017     foot; leg     Neuropathy 05/2009     polyneuropathy    Pain, back 04/2008    Pain, eye, right 5/14/04    Pancreatitis 5/14/04    Pseudocyst, pancreas 5/14/04    Scalp lesion 08/2007    Tinea pedis      Tobacco abuse 03/2008    Vaginal bleeding, abnormal 6/2007    Wears dentures             Review of Systems: A 12 point review of systems is unremarkable with the exception of the chief complaint. Patient specifically denies nausea, vomiting, fever, chills, shortness of breath, chest pain, abdominal pain, constipation, or difficulty urinating.      OBJECTIVE  Patient presents to clinic with assistance of a wheelchair and accompanied by her daughter. She is wearing a CAM boot to bilateral lower extremity.     VASCULAR: DP and PT pulses are palpable 2/4 b/l. CFT is brisk to the right TMA distal stump site RLE and remaning digits on the left foot. Skin temperature warm to cool from proximal to distal with no focal calor b/l LE. Mild diffuse non-pitting edema noted to b/l LE.     NEUROLOGIC: Protective sensation is diminished at all pedal sites b/l. Gross and epicritic sensation is diminished b/l.      DERMATOLOGIC: Dermatological changes noted B/L LE. Left foot toenails 2-5 within normal length, but discolored yellow and thickend. Webspaces left foot 2-4 macerated with debris, however intact. Diffuse flaky/xerotic tissue noted to B/L LE. Hyperkeratotic tissue noted to the left plantar first metatarsal head. Full thickness ulceration right foot plantar aspect near cuboid region that measures 2.1 cm x  1.3 cm x 0.1 cm. Wound base pink dermal tissue with mildly macerated edges. Wound does not probe to bone, tunnel, or track. No fluctuance, crepitus, or drainage noted. No malodor noted from wound.  Wound appears stable and without acute signs of infection or periwound erythema. Right lateral calf wound has healed with overlying scab. Images from 10/26. Patient provided verbal consent for today's photos. MUSCULOSKELETAL: Muscle strength is 4/5 for all pedal groups tested. Pain with palpation of the right foot at distal aspect of the TMA site. Ankle joint ROM is decreased in dorsiflexion with the knee extended.         ASSESSMENT  1. Full thickness ulceration, sub cuboid Right 2/2 friction- Carpenter 1  2. Onychomycosis x 4, Left  3. Macerated webspaces: Left foot  4.  Diabetes Mellitus Type II uncontrolled

## 2020-10-26 NOTE — PATIENT INSTRUCTIONS
Dressing changes every other day with Modesta, guaze, curlex, ace bandage. Non weight bearing to lower extremities. Betadine to web spaces in foot daily. Return in one week.

## 2020-11-09 ENCOUNTER — OFFICE VISIT (OUTPATIENT)
Dept: INTERNAL MEDICINE CLINIC | Age: 55
End: 2020-11-09
Payer: MEDICARE

## 2020-11-09 PROCEDURE — 99213 OFFICE O/P EST LOW 20 MIN: CPT | Performed by: PODIATRIST

## 2020-11-09 PROCEDURE — 11000 DBRDMT ECZ/INFECTED SKIN<10%: CPT

## 2020-11-09 NOTE — PATIENT INSTRUCTIONS
Aniceto Hong, kerlix, ace bandage to right lower extremity. Betadine to the web spaces. Ace bandage left lower extremity. Follow up in a week.

## 2020-11-09 NOTE — PROGRESS NOTES
Department of Podiatry  Resident Progress Note     Hussain Cornejo  Allergies: Sulfa antibiotics     SUBJECTIVE  The patient is a 59 y. o. female who presents to [address] [place] [address] outpatient podiatry clinic for follow up evaluation of wounds to bilateral lower extremities. Patient states her daughter has been changing her dressings every other day as instructed. Patient reports compliance with her nonweightbearing status this week. Patient reports that her dressing only got changed once this week. Patient reports being diabetic and regularly checks her blood sugar. Patient reports her last known blood sugar was yesterday at 312 mg/dL. Patient denies, chills, cough, nausea, vomiting, shortness of breath, chest pain. Patient has no other pedal complaints during today's evaluation.     Past Medical History:    Past Medical History             Diagnosis Date    Amenorrhea      Anomalies of nails      Asthma 5/14/04    Athlete's foot 8/2010    Bacterial vaginosis 04/2008    Carpal tunnel syndrome 5/2007    [facility] ([facility])      Diabetes mellitus type II 08/2007    Diabetic neuropathy ([facility] [address] 8/10    DVT (deep venous thrombosis) ([facility] [address] 3/2004    Dyslipidemia 5/2009    Dyspareunia 05/2009    ETOH abuse 3/04/2007    Feet clawing      HTN (hypertension)      Hx of blood clots      Hyperlipidemia      MRSA (methicillin resistant staph aureus) culture positive 11/06/2017; 11/17/2017     foot; leg     Neuropathy 05/2009     polyneuropathy    Pain, back 04/2008    Pain, eye, right 5/14/04    Pancreatitis 5/14/04    Pseudocyst, pancreas 5/14/04    Scalp lesion 08/2007    Tinea pedis      Tobacco abuse 03/2008    Vaginal bleeding, abnormal 6/2007    Wears dentures             Review of Systems: A 12 point review of systems is unremarkable with the exception of the chief complaint.  Patient specifically denies nausea, vomiting, fever, chills, shortness of breath, chest pain, abdominal pain, constipation, or difficulty urinating.      OBJECTIVE  Patient presents to clinic with assistance of a wheelchair and accompanied by her daughter. She is wearing a CAM boot to bilateral lower extremity. She is alert and oriented to person, place, and time and appears to be in no acute distress.     VASCULAR: DP and PT pulses are palpable 2/4 b/l. CFT is brisk to the right TMA distal stump site RLE and remaning digits on the left foot. Skin temperature warm to cool from proximal to distal with no focal calor b/l LE. Mild diffuse non-pitting edema noted to b/l LE.     NEUROLOGIC: Protective sensation is diminished at all pedal sites b/l. Gross and epicritic sensation is diminished b/l.      DERMATOLOGIC: Dermatological changes noted B/L LE. Left foot toenails 2-5 within normal length, but discolored yellow and thickend. Webspaces left foot 2-4 macerated with debris, however intact. Diffuse flaky/xerotic tissue noted to B/L LE. Hyperkeratotic tissue noted to the left plantar first metatarsal head. Full thickness ulceration right foot plantar aspect near cuboid region that measures 1.9 cm x  0.9 cm x 0.1 cm. Wound base pink dermal tissue with mildly macerated edges. Wound does not probe to bone, tunnel, or track. No fluctuance, crepitus, or drainage noted. No malodor noted from wound.  Wound appears stable and without acute signs of infection or periwound erythema. Right lateral calf wound has healed with overlying scab. Patient provided verbal consent for today's photos. MUSCULOSKELETAL: Muscle strength is 4/5 for all pedal groups tested. Pain with palpation of the right foot at distal aspect of the TMA site. Ankle joint ROM is decreased in dorsiflexion with the knee extended.         ASSESSMENT  1. Full thickness ulceration, sub cuboid Right 2/2 friction- Carpenter 1  2. Onychomycosis x 4, Left  3. Macerated webspaces: Left foot  4. Diabetes Mellitus Type II uncontrolled with peripheral neuropathy  5. History of non-compliance with weightbearing status and dressing changes    PLAN  -Evaluation and management x 15 minutes and greater than 50% of the time spent explaining the etiology and treatment with the patient.   -Right lower extremity dressing: Modesta, DSD, and Ace bandage. Patient to apply Modesta at home with dressing changes  -Apply Betadine to macerated webspaces left lower extremity.  -Left lower extremity dressing: Ace bandage with gentle compression up to the knee  -Instructed patient to apply Betadine to webspaces daily and to dry in between digits well after showering.   -Instructed patient to continue to perform dressing changes every other day b/l LE consisting of the dressings as seen above. Patient and daughter understood.  -Patient instructed to be continued to be strict nonweightbearing to right lower extremities in CAM boots. Patient and daughter understood  -Instructed patient to elevate both lower extremities as much as possible to aid in edema control.  -Instructed patient on proper diabetic foot education including but not limited to: Having a tight glycemic index whether through food intake or exercise, checking blood sugars daily, inspecting feet daily, and never walking barefoot.  -Instructed patient if she notes signs of infection, including but not limited to -increased redness/streaking up the leg, purulent drainage, pain out of proportion, fevers, chills, or shortness of breath to call [facility] outpatient podiatry clinic or go to the nearest emergency department for evaluation and treatment. Patient and daughter understood.  -Patient is still not optimized for surgery given her acute kidney injury. We will continue with local wound care until optimized for surgery.  -Patient is to return to [facility] outpatient podiatry clinic in 1 week.     Discussed assessment and plan with RAMONE Melendez DPM  Podiatric Resident, PGY-1  Pager #: [phone] or [employer]

## 2020-11-16 ENCOUNTER — OFFICE VISIT (OUTPATIENT)
Dept: INTERNAL MEDICINE CLINIC | Age: 55
End: 2020-11-16
Payer: MEDICARE

## 2020-11-16 DIAGNOSIS — N18.4 CKD (CHRONIC KIDNEY DISEASE) STAGE 4, GFR 15-29 ML/MIN (HCC): ICD-10-CM

## 2020-11-16 DIAGNOSIS — N17.1 ACUTE RENAL FAILURE WITH ACUTE CORTICAL NECROSIS (HCC): ICD-10-CM

## 2020-11-16 LAB
ALBUMIN SERPL-MCNC: 3.4 G/DL (ref 3.4–5)
ANION GAP SERPL CALCULATED.3IONS-SCNC: 11 MMOL/L (ref 3–16)
BASOPHILS ABSOLUTE: 0 K/UL (ref 0–0.2)
BASOPHILS RELATIVE PERCENT: 0.7 %
BUN BLDV-MCNC: 47 MG/DL (ref 7–20)
CALCIUM SERPL-MCNC: 8.9 MG/DL (ref 8.3–10.6)
CHLORIDE BLD-SCNC: 103 MMOL/L (ref 99–110)
CO2: 23 MMOL/L (ref 21–32)
CREAT SERPL-MCNC: 2.7 MG/DL (ref 0.6–1.1)
CREATININE URINE: 153.6 MG/DL (ref 28–259)
EOSINOPHILS ABSOLUTE: 0.2 K/UL (ref 0–0.6)
EOSINOPHILS RELATIVE PERCENT: 2.3 %
GFR AFRICAN AMERICAN: 22
GFR NON-AFRICAN AMERICAN: 18
GLUCOSE BLD-MCNC: 88 MG/DL (ref 70–99)
HCT VFR BLD CALC: 27.5 % (ref 36–48)
HEMOGLOBIN: 8.8 G/DL (ref 12–16)
LYMPHOCYTES ABSOLUTE: 2.1 K/UL (ref 1–5.1)
LYMPHOCYTES RELATIVE PERCENT: 28 %
MCH RBC QN AUTO: 26.7 PG (ref 26–34)
MCHC RBC AUTO-ENTMCNC: 31.8 G/DL (ref 31–36)
MCV RBC AUTO: 83.8 FL (ref 80–100)
MICROALBUMIN UR-MCNC: 195.6 MG/DL
MICROALBUMIN/CREAT UR-RTO: 1273.4 MG/G (ref 0–30)
MONOCYTES ABSOLUTE: 0.4 K/UL (ref 0–1.3)
MONOCYTES RELATIVE PERCENT: 4.7 %
NEUTROPHILS ABSOLUTE: 4.8 K/UL (ref 1.7–7.7)
NEUTROPHILS RELATIVE PERCENT: 64.3 %
PDW BLD-RTO: 16.4 % (ref 12.4–15.4)
PHOSPHORUS: 4 MG/DL (ref 2.5–4.9)
PLATELET # BLD: 281 K/UL (ref 135–450)
PMV BLD AUTO: 9.6 FL (ref 5–10.5)
POTASSIUM SERPL-SCNC: 5 MMOL/L (ref 3.5–5.1)
RBC # BLD: 3.28 M/UL (ref 4–5.2)
SODIUM BLD-SCNC: 137 MMOL/L (ref 136–145)
WBC # BLD: 7.5 K/UL (ref 4–11)

## 2020-11-16 PROCEDURE — 99213 OFFICE O/P EST LOW 20 MIN: CPT | Performed by: PODIATRIST

## 2020-11-16 NOTE — PROGRESS NOTES
Department of Podiatry  Resident Progress Note     Eugenia Reynoso  Allergies: Sulfa antibiotics     SUBJECTIVE  The patient is a 59 y. o. female who [facility] podiatry clinic for follow up evaluation of wounds to bilateral lower extremities. Patient states she has been changing her dressings every other day as instructed. Patient reports she has been compliant with her weightbearing status since her last visit. Patient denies any acute events since her last visit. Patient admits to being diabetic and regularly checks her blood sugar however does not recall her most recent blood sugar value. Patient denies, chills, cough, nausea, vomiting, shortness of breath, chest pain. Patient has no other pedal complaints during today's evaluation.     Past Medical History:    Past Medical History             Diagnosis Date    Amenorrhea      Anomalies of nails      Asthma 5/14/04    Athlete's foot 8/2010    Bacterial vaginosis 04/2008    Carpal tunnel syndrome 5/2007    COPD [facility] obstructive pulmonary disease) ([facility])      Diabetes mellitus type II 08/2007    Diabetic neuropathy ([facility] [address] 8/10    DVT (deep venous thrombosis) ([facility] [address] 3/2004    Dyslipidemia 5/2009    Dyspareunia 05/2009    ETOH abuse 3/04/2007    Feet clawing      HTN (hypertension)      Hx of blood clots      Hyperlipidemia      MRSA (methicillin resistant staph aureus) culture positive 11/06/2017; 11/17/2017     foot; leg     Neuropathy 05/2009     polyneuropathy    Pain, back 04/2008    Pain, eye, right 5/14/04    Pancreatitis 5/14/04    Pseudocyst, pancreas 5/14/04    Scalp lesion 08/2007    Tinea pedis      Tobacco abuse 03/2008    Vaginal bleeding, abnormal 6/2007    Wears dentures             Review of Systems: A 12 point review of systems is unremarkable with the exception of the chief complaint.  Patient specifically denies nausea, vomiting, fever, chills, shortness of breath, chest pain, abdominal pain, constipation, or difficulty urinating.      OBJECTIVE  Patient presents to clinic with assistance of a wheelchair unaccompanied. She is wearing a CAM boot to bilateral lower extremity. She is alert and oriented to person, place, and time and appears to be in no acute distress.     VASCULAR: DP and PT pulses are palpable 2/4 b/l. CFT is brisk to the right TMA distal stump site RLE and remaning digits on the left foot. Skin temperature warm to cool from proximal to distal with no focal calor b/l LE. Mild diffuse non-pitting edema noted to b/l LE.     NEUROLOGIC: Protective sensation is diminished at all pedal sites b/l. Gross and epicritic sensation is diminished b/l.      DERMATOLOGIC: Dermatological changes noted B/L LE. Left foot toenails 2-5 within normal length, but discolored yellow and thickend. Webspaces left foot 2-4 clean, dry, intact. Diffuse flaky/xerotic tissue noted to B/L LE. Hyperkeratotic tissue noted to the left plantar first metatarsal head. Full thickness ulceration right foot plantar aspect near cuboid region that measures 1.0 cm x  0.8 cm x 0.1 cm. Wound base is granular with overlying biofilm. Wound does not probe to bone, tunnel, or track. No fluctuance, crepitus, or drainage noted. No malodor noted from wound.  Wound appears stable and without acute signs of infection or periwound erythema. Right lateral calf wound has healed with overlying scab. Images from 11/16. Patient provided verbal consent for today's photos. MUSCULOSKELETAL: Muscle strength is 4/5 for all pedal groups tested. Pain with palpation of the right foot at distal aspect of the TMA site. Ankle joint ROM is decreased in dorsiflexion with the knee extended.         ASSESSMENT  1. Full thickness ulceration, sub cuboid Right 2/2 friction- Carpenter 1  2. Onychomycosis x 4, Left  3. Macerated webspaces: Left foot  4. Diabetes Mellitus Type II uncontrolled with peripheral neuropathy  5.  History of non-compliance with weightbearing status and dressing changes    PLAN  -Evaluation and management x 30 minutes and greater than 50% of the time spent explaining the etiology and treatment with the patient.   -Right lower extremity dressing: Modesta, DSD, and Ace bandage. Patient to apply Modesta at home with dressing changes  -Applied Betadine to macerated webspaces left lower extremity.  -Left lower extremity dressing: Ace bandage with gentle compression up to the knee  -Instructed patient to apply Betadine to webspaces daily and to dry in between digits well after showering.   -Instructed patient to continue to perform dressing changes every other day b/l LE consisting of the dressings as seen above. Patient understood.  -Patient instructed to be continued to be strict nonweightbearing to right lower extremities in CAM boots. Patient and daughter understood  -Instructed patient to elevate both lower extremities as much as possible to aid in edema control.  -Instructed patient on proper diabetic foot education including but not limited to: Having a tight glycemic index whether through food intake or exercise, checking blood sugars daily, inspecting feet daily, and never walking barefoot. -Given patient's clinical improvement, we will continue with local wound care as opposed to surgical intervention given patient's recent FAHEEM. -Patient is to return to [address] [place] [address] outpatient podiatry clinic in 1 week.     Discussed assessment and plan with RAMONE Moses DPM  Podiatric Resident, PGY-1  Pager #: [phone] or Yissel Dumas

## 2020-11-16 NOTE — PATIENT INSTRUCTIONS
Continue tiffany every other day to wound . On other foot betadine between to toes. Return in 1 week.

## 2020-11-19 ENCOUNTER — TELEPHONE (OUTPATIENT)
Dept: INTERNAL MEDICINE CLINIC | Age: 55
End: 2020-11-19

## 2020-11-19 RX ORDER — INSULIN ASPART 100 [IU]/ML
8 INJECTION, SOLUTION INTRAVENOUS; SUBCUTANEOUS
Qty: 5 PEN | Refills: 3 | Status: SHIPPED | OUTPATIENT
Start: 2020-11-19 | End: 2021-01-01 | Stop reason: CLARIF

## 2020-11-19 NOTE — TELEPHONE ENCOUNTER
Pt called stating that she needs a prescription for novalog in \"pen form\" so she can use it. Because what she was originally prescribed she cannot use.

## 2020-11-23 ENCOUNTER — OFFICE VISIT (OUTPATIENT)
Dept: INTERNAL MEDICINE CLINIC | Age: 55
End: 2020-11-23
Payer: MEDICARE

## 2020-11-23 PROCEDURE — 29405 APPL SHORT LEG CAST: CPT

## 2020-11-23 PROCEDURE — 99213 OFFICE O/P EST LOW 20 MIN: CPT | Performed by: PODIATRIST

## 2020-11-23 PROCEDURE — 11000 DBRDMT ECZ/INFECTED SKIN<10%: CPT

## 2020-11-23 NOTE — PROGRESS NOTES
to bilateral lower extremity. She is alert and oriented to person, place, and time and appears to be in no acute distress.     VASCULAR: DP and PT pulses are palpable 2/4 b/l. CFT is brisk to the right TMA distal stump site RLE and remaning digits on the left foot. Skin temperature warm to cool from proximal to distal with no focal calor b/l LE. Mild diffuse non-pitting edema noted to b/l LE.     NEUROLOGIC: Protective sensation is diminished at all pedal sites b/l. Gross and epicritic sensation is diminished b/l.      DERMATOLOGIC: Dermatological changes noted B/L LE. Left foot toenails 2-5 within normal length, but discolored yellow and thickend. Webspaces left foot 2-4 clean, dry, intact. Diffuse flaky/xerotic tissue noted to B/L LE. Hyperkeratotic tissue noted to the left plantar first metatarsal head. Full thickness ulceration right foot plantar aspect near cuboid region that measures 1.1 cm x 0.7 cm x 0.1 cm. Wound base is granular with overlying biofilm. Wound does not probe to bone, tunnel, or track. No fluctuance, crepitus, or drainage noted. No malodor noted from wound.  Wound appears stable and without acute signs of infection or periwound erythema. Right lateral calf wound has healed with overlying scab. Images from 11/23. Patient provided verbal consent for today's photos. MUSCULOSKELETAL: Muscle strength is 4/5 for all pedal groups tested. Pain with palpation of the right foot at distal aspect of the TMA site. Ankle joint ROM is decreased in dorsiflexion with the knee extended.         ASSESSMENT  1. Full thickness ulceration, sub cuboid Right 2/2 friction- Carpenter 1  2. Onychomycosis x 4, Left  3. Macerated webspaces x 3: Left foot  4. Diabetes Mellitus Type II uncontrolled with peripheral neuropathy  5.  History of non-compliance with weightbearing status and dressing changes    PLAN  -Evaluation and management x 30 minutes and greater than 50% of the time spent explaining the etiology and treatment with the patient.   -Right lower extremity dressing: Modesta, DSD, and Ace bandage. Patient to apply Modesta at home with dressing changes  -Applied Betadine to macerated webspaces left lower extremity.  -Left lower extremity dressing: Ace bandage with gentle compression up to the knee  -Instructed patient to apply Betadine to webspaces daily and to dry in between digits well after showering.   -Instructed patient to continue to perform dressing changes every other day b/l LE consisting of the dressings and Betadine to webspaces as seen above. Patient understood.  -Patient instructed to be continued strict nonweightbearing to right lower extremities in CAM boots. Patient understood  -Instructed patient to elevate both lower extremities as much as possible to aid in edema control.  -Instructed patient on proper diabetic foot education including but not limited to: Having a tight glycemic index whether through food intake or exercise, checking blood sugars daily, inspecting feet daily, and never walking barefoot. -Given patient's clinical improvement, we will continue with local wound care as opposed to surgical intervention given patient's recent FAHEEM. -Patient is to return to [facility] outpatient podiatry clinic in 1 week.     Discussed assessment and plan with RAMONE Farah DPM  Podiatric Resident, PGY-1  Pager #: [phone] or [place]

## 2020-12-07 NOTE — PROGRESS NOTES
Department of Podiatry  Resident Progress Note     Young Odilia  Allergies: Sulfa antibiotics     SUBJECTIVE  The patient is a 59 y. o. female who presents to [facility]iatry clinic for follow up evaluation of wound to right foot. Patient states she has been changing her dressings without her daughter to the right foot as instructed at prior visit. Patient reports early this week she noticed increased redness and tenderness to her left lower extremity. Patient states she is feeling well overall but is concerned there is an infection present in her left foot. Denies any purulent drainage or malodor coming from the foot. Patient states her blood sugar is this week have varied between 200 and 500 mg/dL. Patient denies, chills, cough, nausea, vomiting, shortness of breath, chest pain. Patient has no other pedal complaints during today's evaluation.     Past Medical History:    Past Medical History             Diagnosis Date    Amenorrhea      Anomalies of nails      Asthma 5/14/04    Athlete's foot 8/2010    Bacterial vaginosis 04/2008    Carpal tunnel syndrome 5/2007    [facility])      Diabetes mellitus type II 08/2007    Diabetic neuropathy ([facility] [address] 8/10    DVT (deep venous thrombosis) ([facility] [address] 3/2004    Dyslipidemia 5/2009    Dyspareunia 05/2009    ETOH abuse 3/04/2007    Feet clawing      HTN (hypertension)      Hx of blood clots      Hyperlipidemia      MRSA (methicillin resistant staph aureus) culture positive 11/06/2017; 11/17/2017     foot; leg     Neuropathy 05/2009     polyneuropathy    Pain, back 04/2008    Pain, eye, right 5/14/04    Pancreatitis 5/14/04    Pseudocyst, pancreas 5/14/04    Scalp lesion 08/2007    Tinea pedis      Tobacco abuse 03/2008    Vaginal bleeding, abnormal 6/2007    Wears dentures             Review of Systems: As above     OBJECTIVE  Patient presents to clinic with assistance of a wheelchair, unaccompanied.  She is wearing a CAM boot to bilateral lower extremity. She is alert and oriented to person, place, and time and appears to be in no acute distress.     VASCULAR: DP and PT pulses are palpable 2/4 b/l. CFT is brisk to the right TMA distal stump site RLE and remaning digits on the left foot. Skin temperature warm to cool from proximal to distal with no focal calor b/l LE. Mild diffuse non-pitting edema noted to b/l LE.     NEUROLOGIC: Protective sensation is diminished at all pedal sites b/l. Gross and epicritic sensation is diminished b/l.      DERMATOLOGIC: Dermatological changes noted B/L LE. Left foot toenails 2-5 within normal length, but discolored yellow and thickend. Webspaces left foot 2-4 clean, dry, intact. Diffuse flaky/xerotic tissue noted to B/L LE. Hemorrhagic bulla noted to the LLE plantar first metatarsal head with surrounding hyperkeratotic tissue and erythema. Upon debridement, full-thickness ulceration noted measuring approximately 0.9 cm x 0.8 cm x 0.3 cm. Sanguinous drainage noted, no malodor noted. No purulent drainage encountered. Wound probes to deep fascia, does not tunnel or track. Full thickness ulceration right foot plantar aspect near cuboid region that measures 0.9 cm x 0.4 cm x 0.1 cm. Wound base is granular with overlying biofilm. Wound does not probe to bone, tunnel, or track. No fluctuance, crepitus, or drainage noted. No malodor noted from wound.  Wound appears stable and without acute signs of infection or periwound erythema. Images from 12/7. Patient provided verbal consent for today's photos. RLE:          LLE:                MUSCULOSKELETAL: Muscle strength is 4/5 for all pedal groups tested. Pain with palpation of the right foot at distal aspect of the TMA site. Ankle joint ROM is decreased in dorsiflexion with the knee extended.         ASSESSMENT  1. Full thickness ulceration, sub cuboid Right 2/2 friction- Carpenter 1  2.  Full thickness ulceration, sub 1st metatarsal infection including but not limited to to increase redness, tenderness, purulent drainage, malodor, nausea, vomiting, fever, chills.  -Patient is to return to [facility] outpatient podiatry clinic in 1 week.     Discussed assessment and plan with RAMONE Cloud DPM  Podiatric Resident, PGY-1  Pager #: [phone] or Perfect Serve

## 2020-12-07 NOTE — PATIENT INSTRUCTIONS
-Please get Xrays of left foot and labs drawn today or as soon as possible  -Prescription for augmentin, fill pick this up and take as directed  -Daily dressing changes as follows:     Left foot: betadine, gauze, kerlix, Ace  Right foot: Modesta, gauze, kerlix, Ace    -If you feel nausea, vomiting, fever, chills, increased redness, purulent drainage or malodor, please present to ED    Return to clinic in 1 week

## 2020-12-14 NOTE — PATIENT INSTRUCTIONS
Please perform daily dressing changes as follows:    RIGHT lower extremity:  - apply tiffany to the base of the wound  - next, apply gauze overtop the wound  - next, apply gauze over the front of the ankle  - next, gently apply kerlix up the leg, and then an ace bandage with gentle compression    LEFT lower extremity:   - apply betadine to that wound   - next, apply gauze overtop the wound  - next, apply gauze over the front of the ankle  - next, gently apply kerlix up the leg, and then an ace bandage with gentle compression    -Please wear CAM boots when out of bed at all times  -Remain nonweight bearing to bilateral lower extremities  -continue taking Augmentin as directed  -return to clinic in 1 week

## 2020-12-14 NOTE — PROGRESS NOTES
Department of Podiatry  Resident Progress Note     Owen Aguilar  Allergies: Sulfa antibiotics     SUBJECTIVE  The patient is a 59 y. o. female who presents to [address] [place] [address] outpatient podiatry clinic for follow up evaluation of wound to right foot. Patient reports she has been feeling well this week, she reports her pain has resolved. She has been taking her antibiotic as directed without complications. Patient's daughter reports her mother's blood sugar as been better controlled this week, with the highest value she could recall was 205mg/dL. Her dressings have been changed daily with tiffany, DSD, ACE, b/l. She has been nonweight bearing to bilateral extremities as directed. Denies nausea, vomiting, fever, chills, shortness of breath, or chest pain.      Past Medical History:    Past Medical History             Diagnosis Date    Amenorrhea      Anomalies of nails      Asthma 5/14/04    Athlete's foot 8/2010    Bacterial vaginosis 04/2008    Carpal tunnel syndrome 5/2007    COPD (chronic obstructive pulmonary disease) ([facility] [address]      Diabetes mellitus type II 08/2007    Diabetic neuropathy ([facility] [address] 8/10    DVT (deep venous thrombosis) ([facility] [address] 3/2004    Dyslipidemia 5/2009    Dyspareunia 05/2009    ETOH abuse 3/04/2007    Feet clawing      HTN (hypertension)      Hx of blood clots      Hyperlipidemia      MRSA (methicillin resistant staph aureus) culture positive 11/06/2017; 11/17/2017     foot; leg     Neuropathy 05/2009     polyneuropathy    Pain, back 04/2008    Pain, eye, right 5/14/04    Pancreatitis 5/14/04    Pseudocyst, pancreas 5/14/04    Scalp lesion 08/2007    Tinea pedis      Tobacco abuse 03/2008    Vaginal bleeding, abnormal 6/2007    Wears dentures             Review of Systems: As above     OBJECTIVE Patient presents to clinic with assistance of a wheelchair, accompanied by her daughter. She is wearing a CAM boot to bilateral lower extremity. She is alert and oriented to person, place, and time and appears to be in no acute distress.     VASCULAR: DP and PT pulses are palpable 2/4 b/l. CFT is brisk to the right TMA distal stump site RLE and remaning digits on the left foot. Skin temperature warm to cool from proximal to distal with no focal calor b/l LE. Mild diffuse non-pitting edema noted to b/l LE.     NEUROLOGIC: Protective sensation is diminished at all pedal sites b/l. Gross and epicritic sensation is diminished b/l.      DERMATOLOGIC: Dermatological changes noted B/L LE. Left foot toenails 2-5 within normal length, but discolored yellow and thickend. Webspaces left foot 2-4 clean, dry, intact. Diffuse flaky/xerotic tissue noted to B/L LE. Full-thickness ulceration noted measuring approximately 3.0 cm x 3.0 cm x 0.2 cm. No drainage noted, no malodor noted. No purulent drainage encountered. Wound does not probe to bone, tunnel, or track. No acute signs of infection noted. Full thickness ulceration right foot plantar aspect near cuboid region that measures 0.8 cm x 0.4 cm x 0.1 cm. Wound base is granular with overlying biofilm. Wound does not probe to bone, tunnel, or track. No fluctuance, crepitus, or drainage noted. No malodor noted from wound.  Wound appears stable and without acute signs of infection or periwound erythema. Images from 12/14. Patient provided verbal consent for today's photos. RLE:      LLE:      MUSCULOSKELETAL: Muscle strength is 4/5 for all pedal groups tested. Pain with palpation of the right foot at distal aspect of the TMA site. Ankle joint ROM is decreased in dorsiflexion with the knee extended.         ASSESSMENT  1. Full thickness ulceration, sub cuboid Right 2/2 friction- Carpenter 1  2.  Full thickness ulceration, sub 1st metatarsal head; left foot- Carpenter 1 3. Onychomycosis x 4, Left  4. Macerated webspaces x 3: Left foot  5. Diabetes Mellitus Type II uncontrolled with peripheral neuropathy  6. History of non-compliance with weightbearing status and dressing changes    PLAN  -Evaluation and management x 30 minutes and greater than 50% of the time spent explaining the etiology and treatment with the patient.   -Using a #15 blade, I excisionally debrided the necrotic and nonviable tissue down to and including subcutaneous tissue. Less than 3cc of bleeding noted. Hemostasis achieved with direct pressure. Patient tolerated well. -Right lower extremity dressing: Modesta, DSD, and Ace bandage. Patient to apply Modesta at home with dressing changes  -Left lower extremity dressing: Betadine, DSD, Ace bandage  -Instructed patient to continue to perform dressing changes every other day b/l LE consisting of the dressings described above and Betadine to webspaces as seen above. Patient understood.  -Patient instructed to be continued strict nonweightbearing to b/l lower extremities in CAM boots. Patient understood  -Instructed patient to continue taking Augmentin as directed through entirety of prescription.  -Instructed patient on proper diabetic foot education including but not limited to: Having a tight glycemic index whether through food intake or exercise, checking blood sugars daily, inspecting feet daily, and never walking barefoot.  -Educated patient on signs symptoms of local and systemic infection including but not limited to to increase redness, tenderness, purulent drainage, malodor, nausea, vomiting, fever, chills.  -Patient is to return to [facility] outpatient podiatry clinic in 1 week.     Discussed assessment and plan with RAMONE Sanchez DPM  Podiatric Resident, PGY-1  Pager #: [phone] or Yissel Serve

## 2020-12-21 NOTE — PATIENT INSTRUCTIONS
Return in 1 week. Left foot dressing every 2-3 days with betadine and band aide and ace bandage. Right foot Modesta to wound gauze , kerlix and ace wrap  Prescription for doxycycline one tablet twice a day.

## 2020-12-28 NOTE — PROGRESS NOTES
Department of Podiatry  Resident Progress Note     Juliana Prieto  Allergies: Sulfa antibiotics     SUBJECTIVE  The patient is a 59 y. o. female who [facility] to [facility]iatry clinic for follow up evaluation of wound to right foot. Patient reports she has been taking her antibiotic as directed, however she doesn't feel any improvement in her right foot pain. Her daughter reports last night the dressing was too painful, so they removed the dressing overnight. Patient's daughter reports her leg is more swollen today because they left the dressing off last night. They report otherwise changing the dressing as instructed a previous appointment. She denies any trauma to the lower extremities. She denies the dressing becoming wet or soiled. She denies nausea, vomiting, fever, chills, shortness of breath or chest pain.          Past Medical History:    Past Medical History             Diagnosis Date    Amenorrhea      Anomalies of nails      Asthma 5/14/04    Athlete's foot 8/2010    Bacterial vaginosis 04/2008    Carpal tunnel syndrome 5/2007    COPD (chronic obstructive pulmonary disease) ([facility] [address]      Diabetes mellitus type II 08/2007    Diabetic neuropathy ([facility] [address] 8/10    DVT (deep venous thrombosis) ([facility] [address] 3/2004    Dyslipidemia 5/2009    Dyspareunia 05/2009    ETOH abuse 3/04/2007    Feet clawing      HTN (hypertension)      Hx of blood clots      Hyperlipidemia      MRSA (methicillin resistant staph aureus) culture positive 11/06/2017; 11/17/2017     foot; leg     Neuropathy 05/2009     polyneuropathy    Pain, back 04/2008    Pain, eye, right 5/14/04    Pancreatitis 5/14/04    Pseudocyst, pancreas 5/14/04    Scalp lesion 08/2007    Tinea pedis      Tobacco abuse 03/2008    Vaginal bleeding, abnormal 6/2007    Wears dentures             Review of Systems: As above     OBJECTIVE Patient presents to clinic with assistance of a wheelchair, accompanied by her daughter. She is wearing a CAM boot to bilateral lower extremity. She is alert and oriented to person, place, and time and appears to be in no acute distress.     VASCULAR: DP and PT pulses are palpable 2/4 b/l. CFT is brisk to the right TMA distal stump site RLE and remaning digits on the left foot. Skin temperature warm to cool from proximal to distal with no focal calor b/l LE. Mild diffuse non-pitting edema noted to b/l LE.     NEUROLOGIC: Protective sensation is diminished at all pedal sites b/l. Gross and epicritic sensation is diminished b/l.      DERMATOLOGIC: Dermatological changes noted B/L LE.   LLE:  Left foot toenails 2-5 elongated, discolored yellow and thickend. Webspaces left foot 2-4 clean, dry, intact. Diffuse flaky/xerotic tissue noted to B/L LE. No open wounds noted to the left lower extremity. RLE:  Full thickness ulceration right foot plantar aspect near cuboid region that measures approximately 1.2 cm x 0.8 cm x 0.1 cm. Wound base is granular and fibrotic tissue with overlying biofilm. The periwound area is macerated tissue with no surrounding erythema. Scant purulent drainage expressed from the wound. Upon debridement, wound is combination of fibrotic and granular tissue. Wound does not probe to bone, tunnel, or track. No fluctuance or crepitus noted. No malodor noted from wound.      Patient provided verbal consent for today's photos. RLE:  Pre-debridement:      Post debridement:          LLE:        MUSCULOSKELETAL: Muscle strength is 4/5 for all pedal groups tested. Pain with palpation of the right foot at distal aspect of the TMA site. Ankle joint ROM is decreased in dorsiflexion with the knee extended.         ASSESSMENT  1. Diabetic foot infection 2/2 full thickness ulceration, sub cuboid Right 2/2 friction- Carpenter 1  2. Onychomycosis x 4, Left  3.  Macerated webspaces x 3: Left foot 4. Diabetes Mellitus Type II uncontrolled with peripheral neuropathy  5. History of non-compliance with weightbearing status and dressing changes    PLAN  -Evaluation and management x 30 minutes and greater than 50% of the time spent explaining the etiology and treatment with the patient.   -Using a #15 blade, I excisionally debrided the necrotic and nonviable tissue right foot wound down to and including subcutaneous tissue. Less than 1cc of bleeding noted. Hemostasis achieved with direct pressure. Patient tolerated well. -Right lower extremity dressing: Betadine, DSD, and Ace bandage.  -Left lower extremity dressing: Betadine to the webspaces, Ace bandage  -Instructed patient to continue to perform dressing changes every other day b/l LE consisting of the dressings described above and Betadine to webspaces as seen above. Patient understood.  -Patient instructed to be continued strict nonweightbearing to b/l lower extremities in CAM boots. Patient understood  -Instructed patient discontinue the doxycycline as she has experienced no improvement in her symptoms. Prescription written for Augmentin, patient has tolerated this well in the past.  -Plan for formal debridement in the operating room and graft application pending medical clearance.  -Patient to see primary care physician for surgical clearance as soon as possible. Patient return to go to [address] [place] [address] the outpatient podiatry clinic in 1 week. If surgery is planned prior to next outpatient appointment, patient will return to clinic within 1 week of surgical intervention.     Discussed assessment and plan with RAMONE Awan DPM  Podiatric Resident, PGY-1  Pager #: [phone] or Yissel Serve

## 2020-12-28 NOTE — PATIENT INSTRUCTIONS
GET PHYSICAL FOR SURGERY . RETURN IN 1 WEEK. STOP THE DOXYCYCLINE  START AUGMENTIN ONE TABLET TWICE A DAY  Change dressing daily with Betadine and dry dressing both feet.

## 2021-01-01 ENCOUNTER — TELEPHONE (OUTPATIENT)
Dept: INTERNAL MEDICINE CLINIC | Age: 56
End: 2021-01-01

## 2021-01-01 ENCOUNTER — OFFICE VISIT (OUTPATIENT)
Dept: INTERNAL MEDICINE CLINIC | Age: 56
End: 2021-01-01
Payer: MEDICARE

## 2021-01-01 ENCOUNTER — HOSPITAL ENCOUNTER (INPATIENT)
Age: 56
LOS: 4 days | Discharge: HOME | DRG: 623 | End: 2021-06-21
Attending: EMERGENCY MEDICINE | Admitting: EMERGENCY MEDICINE
Payer: COMMERCIAL

## 2021-01-01 ENCOUNTER — APPOINTMENT (OUTPATIENT)
Dept: GENERAL RADIOLOGY | Age: 56
DRG: 617 | End: 2021-01-01

## 2021-01-01 ENCOUNTER — APPOINTMENT (OUTPATIENT)
Dept: GENERAL RADIOLOGY | Age: 56
DRG: 291 | End: 2021-01-01
Payer: MEDICARE

## 2021-01-01 ENCOUNTER — APPOINTMENT (OUTPATIENT)
Dept: MRI IMAGING | Age: 56
DRG: 617 | End: 2021-01-01

## 2021-01-01 ENCOUNTER — OFFICE VISIT (OUTPATIENT)
Dept: INTERNAL MEDICINE CLINIC | Age: 56
End: 2021-01-01
Payer: COMMERCIAL

## 2021-01-01 ENCOUNTER — APPOINTMENT (OUTPATIENT)
Dept: GENERAL RADIOLOGY | Age: 56
DRG: 901 | End: 2021-01-01
Payer: MEDICARE

## 2021-01-01 ENCOUNTER — ANESTHESIA (OUTPATIENT)
Dept: OPERATING ROOM | Age: 56
DRG: 617 | End: 2021-01-01

## 2021-01-01 ENCOUNTER — APPOINTMENT (OUTPATIENT)
Dept: CT IMAGING | Age: 56
DRG: 291 | End: 2021-01-01
Payer: MEDICARE

## 2021-01-01 ENCOUNTER — TELEPHONE (OUTPATIENT)
Dept: OTHER | Facility: CLINIC | Age: 56
End: 2021-01-01

## 2021-01-01 ENCOUNTER — TELEPHONE (OUTPATIENT)
Dept: NEPHROLOGY | Age: 56
End: 2021-01-01

## 2021-01-01 ENCOUNTER — APPOINTMENT (OUTPATIENT)
Dept: VASCULAR LAB | Age: 56
DRG: 617 | End: 2021-01-01

## 2021-01-01 ENCOUNTER — APPOINTMENT (OUTPATIENT)
Dept: CT IMAGING | Age: 56
DRG: 622 | End: 2021-01-01
Payer: MEDICARE

## 2021-01-01 ENCOUNTER — ANESTHESIA EVENT (OUTPATIENT)
Dept: OPERATING ROOM | Age: 56
DRG: 617 | End: 2021-01-01

## 2021-01-01 ENCOUNTER — APPOINTMENT (OUTPATIENT)
Dept: ULTRASOUND IMAGING | Age: 56
DRG: 291 | End: 2021-01-01
Payer: MEDICARE

## 2021-01-01 ENCOUNTER — APPOINTMENT (OUTPATIENT)
Dept: CT IMAGING | Age: 56
DRG: 623 | End: 2021-01-01
Payer: COMMERCIAL

## 2021-01-01 ENCOUNTER — APPOINTMENT (OUTPATIENT)
Dept: MRI IMAGING | Age: 56
DRG: 623 | End: 2021-01-01
Payer: COMMERCIAL

## 2021-01-01 ENCOUNTER — APPOINTMENT (OUTPATIENT)
Dept: GENERAL RADIOLOGY | Age: 56
DRG: 623 | End: 2021-01-01
Payer: COMMERCIAL

## 2021-01-01 ENCOUNTER — HOSPITAL ENCOUNTER (INPATIENT)
Age: 56
LOS: 15 days | Discharge: NURSING, REHAB OR LONG-TERM CARE | DRG: 617 | End: 2021-10-16
Attending: EMERGENCY MEDICINE | Admitting: INTERNAL MEDICINE
Payer: MEDICARE

## 2021-01-01 ENCOUNTER — HOSPITAL ENCOUNTER (EMERGENCY)
Age: 56
Discharge: LEFT BEFORE DISCHARGE | End: 2021-11-27
Attending: EMERGENCY MEDICINE
Payer: MEDICARE

## 2021-01-01 ENCOUNTER — HOSPITAL ENCOUNTER (OUTPATIENT)
Age: 56
Setting detail: OUTPATIENT SURGERY
Discharge: HOME | End: 2021-03-29
Attending: PODIATRIST | Admitting: PODIATRIST
Payer: MEDICARE

## 2021-01-01 ENCOUNTER — APPOINTMENT (OUTPATIENT)
Dept: CT IMAGING | Age: 56
DRG: 901 | End: 2021-01-01
Payer: MEDICARE

## 2021-01-01 ENCOUNTER — HOSPITAL ENCOUNTER (OUTPATIENT)
Dept: GENERAL RADIOLOGY | Age: 56
Discharge: HOME | End: 2021-07-26
Payer: COMMERCIAL

## 2021-01-01 ENCOUNTER — HOSPITAL ENCOUNTER (EMERGENCY)
Age: 56
End: 2021-12-03
Attending: EMERGENCY MEDICINE
Payer: MEDICARE

## 2021-01-01 ENCOUNTER — ANESTHESIA (OUTPATIENT)
Dept: ENDOSCOPY | Age: 56
DRG: 901 | End: 2021-01-01
Payer: MEDICARE

## 2021-01-01 ENCOUNTER — APPOINTMENT (OUTPATIENT)
Dept: GENERAL RADIOLOGY | Age: 56
DRG: 622 | End: 2021-01-01
Payer: MEDICARE

## 2021-01-01 ENCOUNTER — HOSPITAL ENCOUNTER (OUTPATIENT)
Age: 56
Discharge: HOME | End: 2021-07-26
Payer: COMMERCIAL

## 2021-01-01 ENCOUNTER — TELEPHONE (OUTPATIENT)
Dept: INFECTIOUS DISEASES | Age: 56
End: 2021-01-01

## 2021-01-01 ENCOUNTER — APPOINTMENT (OUTPATIENT)
Dept: INTERVENTIONAL RADIOLOGY/VASCULAR | Age: 56
DRG: 617 | End: 2021-01-01

## 2021-01-01 ENCOUNTER — HOSPITAL ENCOUNTER (INPATIENT)
Age: 56
LOS: 13 days | Discharge: NURSING, REHAB OR LONG-TERM CARE | DRG: 901 | End: 2021-11-16
Attending: EMERGENCY MEDICINE | Admitting: INTERNAL MEDICINE
Payer: MEDICARE

## 2021-01-01 ENCOUNTER — HOSPITAL ENCOUNTER (INPATIENT)
Age: 56
LOS: 8 days | Discharge: NURSING, REHAB OR LONG-TERM CARE | DRG: 291 | End: 2021-11-01
Attending: EMERGENCY MEDICINE | Admitting: INTERNAL MEDICINE
Payer: MEDICARE

## 2021-01-01 ENCOUNTER — OFFICE VISIT (OUTPATIENT)
Dept: INTERNAL MEDICINE CLINIC | Age: 56
DRG: 623 | End: 2021-01-01
Payer: COMMERCIAL

## 2021-01-01 ENCOUNTER — ANESTHESIA EVENT (OUTPATIENT)
Dept: ENDOSCOPY | Age: 56
DRG: 901 | End: 2021-01-01
Payer: MEDICARE

## 2021-01-01 ENCOUNTER — NURSE ONLY (OUTPATIENT)
Dept: PRIMARY CARE CLINIC | Age: 56
End: 2021-01-01
Payer: MEDICARE

## 2021-01-01 ENCOUNTER — HOSPITAL ENCOUNTER (INPATIENT)
Age: 56
LOS: 2 days | Discharge: NURSING, REHAB OR LONG-TERM CARE | DRG: 622 | End: 2021-10-20
Attending: EMERGENCY MEDICINE | Admitting: INTERNAL MEDICINE
Payer: MEDICARE

## 2021-01-01 VITALS — OXYGEN SATURATION: 94 % | TEMPERATURE: 95.5 F | SYSTOLIC BLOOD PRESSURE: 93 MMHG | DIASTOLIC BLOOD PRESSURE: 55 MMHG

## 2021-01-01 VITALS
DIASTOLIC BLOOD PRESSURE: 61 MMHG | SYSTOLIC BLOOD PRESSURE: 120 MMHG | HEART RATE: 70 BPM | HEIGHT: 60 IN | BODY MASS INDEX: 39.43 KG/M2 | WEIGHT: 200.84 LBS | OXYGEN SATURATION: 93 % | RESPIRATION RATE: 16 BRPM | TEMPERATURE: 98.6 F

## 2021-01-01 VITALS
HEIGHT: 62 IN | SYSTOLIC BLOOD PRESSURE: 163 MMHG | WEIGHT: 218.03 LBS | OXYGEN SATURATION: 94 % | RESPIRATION RATE: 16 BRPM | BODY MASS INDEX: 40.12 KG/M2 | DIASTOLIC BLOOD PRESSURE: 69 MMHG | TEMPERATURE: 98.2 F | HEART RATE: 66 BPM

## 2021-01-01 VITALS
WEIGHT: 223.11 LBS | HEIGHT: 60 IN | BODY MASS INDEX: 43.8 KG/M2 | HEART RATE: 79 BPM | SYSTOLIC BLOOD PRESSURE: 129 MMHG | DIASTOLIC BLOOD PRESSURE: 82 MMHG | RESPIRATION RATE: 10 BRPM | OXYGEN SATURATION: 91 % | TEMPERATURE: 98.3 F

## 2021-01-01 VITALS
BODY MASS INDEX: 36.8 KG/M2 | HEART RATE: 58 BPM | WEIGHT: 200 LBS | DIASTOLIC BLOOD PRESSURE: 69 MMHG | TEMPERATURE: 97.5 F | SYSTOLIC BLOOD PRESSURE: 179 MMHG | HEIGHT: 62 IN | OXYGEN SATURATION: 94 %

## 2021-01-01 VITALS
RESPIRATION RATE: 18 BRPM | SYSTOLIC BLOOD PRESSURE: 142 MMHG | HEIGHT: 62 IN | OXYGEN SATURATION: 93 % | TEMPERATURE: 96.4 F | WEIGHT: 228.18 LBS | BODY MASS INDEX: 41.99 KG/M2 | HEART RATE: 66 BPM | DIASTOLIC BLOOD PRESSURE: 64 MMHG

## 2021-01-01 VITALS
RESPIRATION RATE: 16 BRPM | DIASTOLIC BLOOD PRESSURE: 77 MMHG | HEART RATE: 104 BPM | OXYGEN SATURATION: 98 % | TEMPERATURE: 98.4 F | SYSTOLIC BLOOD PRESSURE: 123 MMHG

## 2021-01-01 VITALS
DIASTOLIC BLOOD PRESSURE: 77 MMHG | OXYGEN SATURATION: 96 % | HEART RATE: 80 BPM | RESPIRATION RATE: 20 BRPM | SYSTOLIC BLOOD PRESSURE: 187 MMHG | TEMPERATURE: 98.7 F

## 2021-01-01 VITALS
WEIGHT: 203.4 LBS | RESPIRATION RATE: 16 BRPM | SYSTOLIC BLOOD PRESSURE: 148 MMHG | OXYGEN SATURATION: 92 % | HEIGHT: 62 IN | HEART RATE: 75 BPM | DIASTOLIC BLOOD PRESSURE: 64 MMHG | TEMPERATURE: 97.1 F | BODY MASS INDEX: 37.43 KG/M2

## 2021-01-01 VITALS
DIASTOLIC BLOOD PRESSURE: 55 MMHG | RESPIRATION RATE: 16 BRPM | HEIGHT: 62 IN | WEIGHT: 200 LBS | BODY MASS INDEX: 36.8 KG/M2 | OXYGEN SATURATION: 95 % | TEMPERATURE: 98.1 F | HEART RATE: 60 BPM | SYSTOLIC BLOOD PRESSURE: 93 MMHG

## 2021-01-01 VITALS
SYSTOLIC BLOOD PRESSURE: 110 MMHG | TEMPERATURE: 97 F | BODY MASS INDEX: 36.55 KG/M2 | DIASTOLIC BLOOD PRESSURE: 67 MMHG | RESPIRATION RATE: 22 BRPM | HEART RATE: 65 BPM | WEIGHT: 198.6 LBS | OXYGEN SATURATION: 96 % | HEIGHT: 62 IN

## 2021-01-01 VITALS — DIASTOLIC BLOOD PRESSURE: 61 MMHG | TEMPERATURE: 96.1 F | SYSTOLIC BLOOD PRESSURE: 132 MMHG | OXYGEN SATURATION: 98 %

## 2021-01-01 VITALS
HEART RATE: 78 BPM | BODY MASS INDEX: 35.79 KG/M2 | DIASTOLIC BLOOD PRESSURE: 69 MMHG | HEIGHT: 60 IN | RESPIRATION RATE: 16 BRPM | TEMPERATURE: 97.9 F | OXYGEN SATURATION: 91 % | WEIGHT: 182.32 LBS | SYSTOLIC BLOOD PRESSURE: 131 MMHG

## 2021-01-01 VITALS — OXYGEN SATURATION: 97 % | SYSTOLIC BLOOD PRESSURE: 130 MMHG | DIASTOLIC BLOOD PRESSURE: 55 MMHG

## 2021-01-01 VITALS
BODY MASS INDEX: 37.6 KG/M2 | OXYGEN SATURATION: 95 % | DIASTOLIC BLOOD PRESSURE: 73 MMHG | WEIGHT: 204.3 LBS | HEART RATE: 63 BPM | SYSTOLIC BLOOD PRESSURE: 158 MMHG | HEIGHT: 62 IN | TEMPERATURE: 97.5 F

## 2021-01-01 VITALS — OXYGEN SATURATION: 100 %

## 2021-01-01 DIAGNOSIS — L97.519 TYPE 2 DIABETES MELLITUS WITH RIGHT DIABETIC FOOT ULCER (HCC): ICD-10-CM

## 2021-01-01 DIAGNOSIS — Z79.4 TYPE 2 DIABETES MELLITUS WITH DIABETIC POLYNEUROPATHY, WITH LONG-TERM CURRENT USE OF INSULIN (HCC): ICD-10-CM

## 2021-01-01 DIAGNOSIS — E08.21 DIABETES MELLITUS DUE TO UNDERLYING CONDITION WITH DIABETIC NEPHROPATHY, WITH LONG-TERM CURRENT USE OF INSULIN (HCC): Primary | ICD-10-CM

## 2021-01-01 DIAGNOSIS — F32.A DEPRESSION, UNSPECIFIED DEPRESSION TYPE: ICD-10-CM

## 2021-01-01 DIAGNOSIS — E11.621 TYPE 2 DIABETES MELLITUS WITH RIGHT DIABETIC FOOT ULCER (HCC): ICD-10-CM

## 2021-01-01 DIAGNOSIS — Z79.4 TYPE 2 DIABETES MELLITUS WITH DIABETIC POLYNEUROPATHY, WITH LONG-TERM CURRENT USE OF INSULIN (HCC): Primary | ICD-10-CM

## 2021-01-01 DIAGNOSIS — L97.519 TYPE 2 DIABETES MELLITUS WITH RIGHT DIABETIC FOOT ULCER (HCC): Primary | ICD-10-CM

## 2021-01-01 DIAGNOSIS — L03.116 CELLULITIS OF LEFT LOWER EXTREMITY: ICD-10-CM

## 2021-01-01 DIAGNOSIS — E11.42 TYPE 2 DIABETES MELLITUS WITH PERIPHERAL NEUROPATHY (HCC): Primary | ICD-10-CM

## 2021-01-01 DIAGNOSIS — E11.42 TYPE 2 DIABETES MELLITUS WITH DIABETIC POLYNEUROPATHY, WITH LONG-TERM CURRENT USE OF INSULIN (HCC): Primary | ICD-10-CM

## 2021-01-01 DIAGNOSIS — L98.8 MACERATION OF SKIN: ICD-10-CM

## 2021-01-01 DIAGNOSIS — E11.621 TYPE 2 DIABETES MELLITUS WITH RIGHT DIABETIC FOOT ULCER (HCC): Primary | ICD-10-CM

## 2021-01-01 DIAGNOSIS — J96.01 ACUTE HYPOXEMIC RESPIRATORY FAILURE (HCC): ICD-10-CM

## 2021-01-01 DIAGNOSIS — Z95.828 S/P PICC CENTRAL LINE PLACEMENT: ICD-10-CM

## 2021-01-01 DIAGNOSIS — E11.42 TYPE 2 DIABETES MELLITUS WITH DIABETIC POLYNEUROPATHY, WITH LONG-TERM CURRENT USE OF INSULIN (HCC): ICD-10-CM

## 2021-01-01 DIAGNOSIS — L97.514 ULCERATED, FOOT, RIGHT, WITH NECROSIS OF BONE (HCC): ICD-10-CM

## 2021-01-01 DIAGNOSIS — I10 ESSENTIAL HYPERTENSION: ICD-10-CM

## 2021-01-01 DIAGNOSIS — L97.509 TYPE 2 DIABETES MELLITUS WITH FOOT ULCER, WITH LONG-TERM CURRENT USE OF INSULIN (HCC): ICD-10-CM

## 2021-01-01 DIAGNOSIS — E11.42 TYPE 2 DIABETES MELLITUS WITH PERIPHERAL NEUROPATHY (HCC): ICD-10-CM

## 2021-01-01 DIAGNOSIS — L03.119 CELLULITIS OF FOOT: Primary | ICD-10-CM

## 2021-01-01 DIAGNOSIS — L03.119 CELLULITIS OF FOOT: ICD-10-CM

## 2021-01-01 DIAGNOSIS — L97.529 TYPE 2 DIABETES MELLITUS WITH LEFT DIABETIC FOOT ULCER (HCC): Primary | ICD-10-CM

## 2021-01-01 DIAGNOSIS — R60.0 LOWER EXTREMITY EDEMA: ICD-10-CM

## 2021-01-01 DIAGNOSIS — M86.471 CHRONIC OSTEOMYELITIS OF RIGHT FOOT WITH DRAINING SINUS (HCC): Primary | ICD-10-CM

## 2021-01-01 DIAGNOSIS — N18.4 CKD (CHRONIC KIDNEY DISEASE) STAGE 4, GFR 15-29 ML/MIN (HCC): ICD-10-CM

## 2021-01-01 DIAGNOSIS — L08.9 LEFT FOOT INFECTION: Primary | ICD-10-CM

## 2021-01-01 DIAGNOSIS — Z48.89 ENCOUNTER FOR POSTOPERATIVE CARE: ICD-10-CM

## 2021-01-01 DIAGNOSIS — Z01.818 PRE-OP EVALUATION: Primary | ICD-10-CM

## 2021-01-01 DIAGNOSIS — I50.32 CHRONIC HEART FAILURE WITH PRESERVED EJECTION FRACTION (HCC): ICD-10-CM

## 2021-01-01 DIAGNOSIS — J96.01 ACUTE HYPOXEMIC RESPIRATORY FAILURE (HCC): Primary | ICD-10-CM

## 2021-01-01 DIAGNOSIS — E11.51 PERIPHERAL VASCULAR DISEASE IN DIABETES MELLITUS (HCC): Primary | ICD-10-CM

## 2021-01-01 DIAGNOSIS — E11.621 TYPE 2 DIABETES MELLITUS WITH LEFT DIABETIC FOOT ULCER (HCC): ICD-10-CM

## 2021-01-01 DIAGNOSIS — Z91.199 HISTORY OF NONCOMPLIANCE WITH MEDICAL TREATMENT: ICD-10-CM

## 2021-01-01 DIAGNOSIS — N18.32 STAGE 3B CHRONIC KIDNEY DISEASE (HCC): ICD-10-CM

## 2021-01-01 DIAGNOSIS — I50.22 CHRONIC SYSTOLIC HEART FAILURE (HCC): ICD-10-CM

## 2021-01-01 DIAGNOSIS — K21.9 GASTROESOPHAGEAL REFLUX DISEASE, UNSPECIFIED WHETHER ESOPHAGITIS PRESENT: ICD-10-CM

## 2021-01-01 DIAGNOSIS — D64.9 ANEMIA, UNSPECIFIED TYPE: ICD-10-CM

## 2021-01-01 DIAGNOSIS — I50.9 ACUTE ON CHRONIC CONGESTIVE HEART FAILURE, UNSPECIFIED HEART FAILURE TYPE (HCC): Primary | ICD-10-CM

## 2021-01-01 DIAGNOSIS — E11.42 DIABETIC POLYNEUROPATHY ASSOCIATED WITH TYPE 2 DIABETES MELLITUS (HCC): ICD-10-CM

## 2021-01-01 DIAGNOSIS — R41.82 ALTERED MENTAL STATUS, UNSPECIFIED ALTERED MENTAL STATUS TYPE: ICD-10-CM

## 2021-01-01 DIAGNOSIS — I46.9 CARDIAC ARREST (HCC): Primary | ICD-10-CM

## 2021-01-01 DIAGNOSIS — E87.5 HYPERKALEMIA: ICD-10-CM

## 2021-01-01 DIAGNOSIS — F11.21 OPIOID DEPENDENCE IN REMISSION (HCC): ICD-10-CM

## 2021-01-01 DIAGNOSIS — E11.51 PERIPHERAL VASCULAR DISEASE IN DIABETES MELLITUS (HCC): ICD-10-CM

## 2021-01-01 DIAGNOSIS — L97.529 TYPE 2 DIABETES MELLITUS WITH LEFT DIABETIC FOOT ULCER (HCC): ICD-10-CM

## 2021-01-01 DIAGNOSIS — I82.4Y9 DEEP VEIN THROMBOSIS (DVT) OF PROXIMAL LOWER EXTREMITY, UNSPECIFIED CHRONICITY, UNSPECIFIED LATERALITY (HCC): ICD-10-CM

## 2021-01-01 DIAGNOSIS — I50.9 CONGESTIVE HEART FAILURE, UNSPECIFIED HF CHRONICITY, UNSPECIFIED HEART FAILURE TYPE (HCC): ICD-10-CM

## 2021-01-01 DIAGNOSIS — E11.69 TYPE 2 DIABETES MELLITUS WITH OTHER SPECIFIED COMPLICATION, WITH LONG-TERM CURRENT USE OF INSULIN (HCC): ICD-10-CM

## 2021-01-01 DIAGNOSIS — L03.311 CELLULITIS OF ABDOMINAL WALL: Primary | ICD-10-CM

## 2021-01-01 DIAGNOSIS — R60.1 ANASARCA: ICD-10-CM

## 2021-01-01 DIAGNOSIS — L08.9: Primary | ICD-10-CM

## 2021-01-01 DIAGNOSIS — Z01.818 PRE-OP EVALUATION: ICD-10-CM

## 2021-01-01 DIAGNOSIS — Z01.818 PREOP EXAMINATION: Primary | ICD-10-CM

## 2021-01-01 DIAGNOSIS — B35.1 ONYCHOMYCOSIS: ICD-10-CM

## 2021-01-01 DIAGNOSIS — S10.81XD: Primary | ICD-10-CM

## 2021-01-01 DIAGNOSIS — Z91.199 NON-COMPLIANCE: ICD-10-CM

## 2021-01-01 DIAGNOSIS — M79.672 FOOT PAIN, LEFT: ICD-10-CM

## 2021-01-01 DIAGNOSIS — G93.40 ENCEPHALOPATHY: Primary | ICD-10-CM

## 2021-01-01 DIAGNOSIS — Z79.4 TYPE 2 DIABETES MELLITUS WITH OTHER SPECIFIED COMPLICATION, WITH LONG-TERM CURRENT USE OF INSULIN (HCC): ICD-10-CM

## 2021-01-01 DIAGNOSIS — E11.621 TYPE 2 DIABETES MELLITUS WITH LEFT DIABETIC FOOT ULCER (HCC): Primary | ICD-10-CM

## 2021-01-01 DIAGNOSIS — R60.0 LOWER EXTREMITY EDEMA: Primary | ICD-10-CM

## 2021-01-01 DIAGNOSIS — A46 ERYSIPELAS: Primary | ICD-10-CM

## 2021-01-01 DIAGNOSIS — F11.20 UNCOMPLICATED OPIOID DEPENDENCE (HCC): ICD-10-CM

## 2021-01-01 DIAGNOSIS — Z79.4 TYPE 2 DIABETES MELLITUS WITH FOOT ULCER, WITH LONG-TERM CURRENT USE OF INSULIN (HCC): ICD-10-CM

## 2021-01-01 DIAGNOSIS — I50.23 SYSTOLIC CHF, ACUTE ON CHRONIC (HCC): ICD-10-CM

## 2021-01-01 DIAGNOSIS — E11.621 TYPE 2 DIABETES MELLITUS WITH FOOT ULCER, WITH LONG-TERM CURRENT USE OF INSULIN (HCC): ICD-10-CM

## 2021-01-01 DIAGNOSIS — Z79.4 DIABETES MELLITUS DUE TO UNDERLYING CONDITION WITH DIABETIC NEPHROPATHY, WITH LONG-TERM CURRENT USE OF INSULIN (HCC): Primary | ICD-10-CM

## 2021-01-01 DIAGNOSIS — M86.471 CHRONIC OSTEOMYELITIS OF RIGHT FOOT WITH DRAINING SINUS (HCC): ICD-10-CM

## 2021-01-01 DIAGNOSIS — Z98.890 S/P FOOT SURGERY, RIGHT: ICD-10-CM

## 2021-01-01 DIAGNOSIS — K21.9 GASTROESOPHAGEAL REFLUX DISEASE: ICD-10-CM

## 2021-01-01 DIAGNOSIS — G89.18 POST-OPERATIVE PAIN: Primary | ICD-10-CM

## 2021-01-01 LAB
A/G RATIO: 0.6 (ref 1.1–2.2)
A/G RATIO: 0.7 (ref 1.1–2.2)
A/G RATIO: 0.7 (ref 1.1–2.2)
A/G RATIO: 0.8 (ref 1.1–2.2)
ABO/RH: NORMAL
AFB CULTURE (MYCOBACTERIA): NORMAL
AFB SMEAR: NORMAL
ALBUMIN SERPL-MCNC: 1.9 G/DL (ref 3.4–5)
ALBUMIN SERPL-MCNC: 2 G/DL (ref 3.4–5)
ALBUMIN SERPL-MCNC: 2.1 G/DL (ref 3.4–5)
ALBUMIN SERPL-MCNC: 2.2 G/DL (ref 3.4–5)
ALBUMIN SERPL-MCNC: 2.3 G/DL (ref 3.4–5)
ALBUMIN SERPL-MCNC: 2.4 G/DL (ref 3.4–5)
ALBUMIN SERPL-MCNC: 2.5 G/DL (ref 3.4–5)
ALBUMIN SERPL-MCNC: 2.6 G/DL (ref 3.1–4.9)
ALBUMIN SERPL-MCNC: 2.6 G/DL (ref 3.4–5)
ALBUMIN SERPL-MCNC: 2.6 G/DL (ref 3.4–5)
ALBUMIN SERPL-MCNC: 2.7 G/DL (ref 3.4–5)
ALBUMIN SERPL-MCNC: 2.9 G/DL (ref 3.4–5)
ALBUMIN SERPL-MCNC: 3.3 G/DL (ref 3.4–5)
ALBUMIN SERPL-MCNC: 3.4 G/DL (ref 3.4–5)
ALP BLD-CCNC: 131 U/L (ref 40–129)
ALP BLD-CCNC: 149 U/L (ref 40–129)
ALP BLD-CCNC: 149 U/L (ref 40–129)
ALP BLD-CCNC: 151 U/L (ref 40–129)
ALP BLD-CCNC: 182 U/L (ref 40–129)
ALP BLD-CCNC: 184 U/L (ref 40–129)
ALP BLD-CCNC: 186 U/L (ref 40–129)
ALP BLD-CCNC: 208 U/L (ref 40–129)
ALP BLD-CCNC: 348 U/L (ref 40–129)
ALPHA-1-GLOBULIN: 0.2 G/DL (ref 0.2–0.4)
ALPHA-2-GLOBULIN: 1 G/DL (ref 0.4–1.1)
ALT SERPL-CCNC: 11 U/L (ref 10–40)
ALT SERPL-CCNC: 13 U/L (ref 10–40)
ALT SERPL-CCNC: 16 U/L (ref 10–40)
ALT SERPL-CCNC: 18 U/L (ref 10–40)
ALT SERPL-CCNC: 18 U/L (ref 10–40)
ALT SERPL-CCNC: 20 U/L (ref 10–40)
ALT SERPL-CCNC: 20 U/L (ref 10–40)
ALT SERPL-CCNC: 21 U/L (ref 10–40)
ALT SERPL-CCNC: 48 U/L (ref 10–40)
AMMONIA: 29 UMOL/L (ref 11–51)
AMPHETAMINE SCREEN, URINE: ABNORMAL
ANAEROBIC CULTURE: ABNORMAL
ANAEROBIC CULTURE: NORMAL
ANION GAP SERPL CALCULATED.3IONS-SCNC: 10 MMOL/L (ref 3–16)
ANION GAP SERPL CALCULATED.3IONS-SCNC: 11 MMOL/L (ref 3–16)
ANION GAP SERPL CALCULATED.3IONS-SCNC: 12 MMOL/L (ref 3–16)
ANION GAP SERPL CALCULATED.3IONS-SCNC: 12 MMOL/L (ref 3–16)
ANION GAP SERPL CALCULATED.3IONS-SCNC: 15 MMOL/L (ref 3–16)
ANION GAP SERPL CALCULATED.3IONS-SCNC: 4 MMOL/L (ref 3–16)
ANION GAP SERPL CALCULATED.3IONS-SCNC: 5 MMOL/L (ref 3–16)
ANION GAP SERPL CALCULATED.3IONS-SCNC: 5 MMOL/L (ref 3–16)
ANION GAP SERPL CALCULATED.3IONS-SCNC: 6 MMOL/L (ref 3–16)
ANION GAP SERPL CALCULATED.3IONS-SCNC: 7 MMOL/L (ref 3–16)
ANION GAP SERPL CALCULATED.3IONS-SCNC: 8 MMOL/L (ref 3–16)
ANION GAP SERPL CALCULATED.3IONS-SCNC: 9 MMOL/L (ref 3–16)
ANISOCYTOSIS: ABNORMAL
ANTI-XA UNFRAC HEPARIN: 0.06 IU/ML (ref 0.3–0.7)
ANTI-XA UNFRAC HEPARIN: 0.49 IU/ML (ref 0.3–0.7)
ANTI-XA UNFRAC HEPARIN: 0.53 IU/ML (ref 0.3–0.7)
ANTI-XA UNFRAC HEPARIN: 0.6 IU/ML (ref 0.3–0.7)
ANTI-XA UNFRAC HEPARIN: 0.73 IU/ML (ref 0.3–0.7)
ANTI-XA UNFRAC HEPARIN: 0.75 IU/ML (ref 0.3–0.7)
ANTI-XA UNFRAC HEPARIN: 0.75 IU/ML (ref 0.3–0.7)
ANTI-XA UNFRAC HEPARIN: 1.02 IU/ML (ref 0.3–0.7)
ANTI-XA UNFRAC HEPARIN: 1.06 IU/ML (ref 0.3–0.7)
ANTIBODY IDENTIFICATION: NORMAL
ANTIBODY SCREEN: NORMAL
APTT: 100.8 SEC (ref 26.2–38.6)
APTT: 163.8 SEC (ref 26.2–38.6)
APTT: 170.1 SEC (ref 26.2–38.6)
APTT: 33.4 SEC (ref 26.2–38.6)
APTT: 36 SEC (ref 26.2–38.6)
APTT: 46.1 SEC (ref 26.2–38.6)
APTT: 61.3 SEC (ref 26.2–38.6)
APTT: 65.5 SEC (ref 26.2–38.6)
APTT: 77 SEC (ref 26.2–38.6)
APTT: 82.2 SEC (ref 26.2–38.6)
APTT: 87.4 SEC (ref 26.2–38.6)
APTT: 92.3 SEC (ref 26.2–38.6)
AST SERPL-CCNC: 16 U/L (ref 15–37)
AST SERPL-CCNC: 16 U/L (ref 15–37)
AST SERPL-CCNC: 18 U/L (ref 15–37)
AST SERPL-CCNC: 20 U/L (ref 15–37)
AST SERPL-CCNC: 21 U/L (ref 15–37)
AST SERPL-CCNC: 22 U/L (ref 15–37)
AST SERPL-CCNC: 90 U/L (ref 15–37)
BACTERIA: ABNORMAL /HPF
BARBITURATE SCREEN URINE: ABNORMAL
BASE EXCESS ARTERIAL: 1.3 MMOL/L (ref -3–3)
BASE EXCESS ARTERIAL: 1.6 MMOL/L (ref -3–3)
BASE EXCESS ARTERIAL: 2 MMOL/L (ref -3–3)
BASE EXCESS ARTERIAL: 5.7 MMOL/L (ref -3–3)
BASE EXCESS ARTERIAL: 7 MMOL/L (ref -3–3)
BASE EXCESS ARTERIAL: 7.5 MMOL/L (ref -3–3)
BASE EXCESS ARTERIAL: 8.6 MMOL/L (ref -3–3)
BASE EXCESS VENOUS: -0.5 MMOL/L (ref -2–3)
BASE EXCESS VENOUS: 6.3 MMOL/L (ref -2–3)
BASE EXCESS VENOUS: 6.3 MMOL/L (ref -2–3)
BASOPHILS ABSOLUTE: 0 K/UL (ref 0–0.2)
BASOPHILS ABSOLUTE: 0.1 /ΜL
BASOPHILS ABSOLUTE: 0.1 K/UL (ref 0–0.2)
BASOPHILS RELATIVE PERCENT: 0.2 %
BASOPHILS RELATIVE PERCENT: 0.2 %
BASOPHILS RELATIVE PERCENT: 0.3 %
BASOPHILS RELATIVE PERCENT: 0.4 %
BASOPHILS RELATIVE PERCENT: 0.5 %
BASOPHILS RELATIVE PERCENT: 0.6 %
BASOPHILS RELATIVE PERCENT: 0.7 %
BASOPHILS RELATIVE PERCENT: 0.7 %
BASOPHILS RELATIVE PERCENT: 0.8 %
BASOPHILS RELATIVE PERCENT: 0.9 %
BASOPHILS RELATIVE PERCENT: 1 %
BASOPHILS RELATIVE PERCENT: 1 %
BENZODIAZEPINE SCREEN, URINE: ABNORMAL
BENZODIAZEPINE SCREEN, URINE: ABNORMAL
BENZODIAZEPINE SCREEN, URINE: POSITIVE
BETA GLOBULIN: 1.1 G/DL (ref 0.9–1.6)
BILIRUB SERPL-MCNC: <0.2 MG/DL (ref 0–1)
BILIRUBIN DIRECT: <0.2 MG/DL (ref 0–0.3)
BILIRUBIN URINE: NEGATIVE
BILIRUBIN, INDIRECT: ABNORMAL MG/DL (ref 0–1)
BLOOD BANK DISPENSE STATUS: NORMAL
BLOOD BANK PRODUCT CODE: NORMAL
BLOOD BANK REF CASE: NORMAL
BLOOD CULTURE, ROUTINE: NORMAL
BLOOD, URINE: ABNORMAL
BLOOD, URINE: ABNORMAL
BLOOD, URINE: NEGATIVE
BPU ID: NORMAL
BUN BLDV-MCNC: 28 MG/DL (ref 7–20)
BUN BLDV-MCNC: 28 MG/DL (ref 7–20)
BUN BLDV-MCNC: 29 MG/DL (ref 7–20)
BUN BLDV-MCNC: 30 MG/DL (ref 7–20)
BUN BLDV-MCNC: 31 MG/DL (ref 7–20)
BUN BLDV-MCNC: 32 MG/DL (ref 7–20)
BUN BLDV-MCNC: 33 MG/DL (ref 7–20)
BUN BLDV-MCNC: 34 MG/DL (ref 7–20)
BUN BLDV-MCNC: 35 MG/DL (ref 7–20)
BUN BLDV-MCNC: 35 MG/DL (ref 7–20)
BUN BLDV-MCNC: 36 MG/DL (ref 7–20)
BUN BLDV-MCNC: 36 MG/DL (ref 7–20)
BUN BLDV-MCNC: 37 MG/DL (ref 7–20)
BUN BLDV-MCNC: 38 MG/DL (ref 7–20)
BUN BLDV-MCNC: 39 MG/DL (ref 7–20)
BUN BLDV-MCNC: 40 MG/DL (ref 7–20)
BUN BLDV-MCNC: 41 MG/DL (ref 7–20)
BUN BLDV-MCNC: 41 MG/DL (ref 7–20)
BUN BLDV-MCNC: 42 MG/DL (ref 7–20)
BUN BLDV-MCNC: 42 MG/DL (ref 7–20)
BUN BLDV-MCNC: 43 MG/DL (ref 7–20)
BUN BLDV-MCNC: 43 MG/DL (ref 7–20)
BUN BLDV-MCNC: 44 MG/DL (ref 7–20)
BUN BLDV-MCNC: 45 MG/DL (ref 7–20)
BUN BLDV-MCNC: 45 MG/DL (ref 7–20)
BUN BLDV-MCNC: 46 MG/DL (ref 7–20)
BUN BLDV-MCNC: 48 MG/DL (ref 7–20)
BUN BLDV-MCNC: 48 MG/DL (ref 7–20)
BUN BLDV-MCNC: 49 MG/DL (ref 7–20)
BUN BLDV-MCNC: 50 MG/DL (ref 7–20)
BUN BLDV-MCNC: 51 MG/DL (ref 7–20)
BUN BLDV-MCNC: 54 MG/DL (ref 7–20)
BUN BLDV-MCNC: 55 MG/DL (ref 7–20)
BUN BLDV-MCNC: 57 MG/DL (ref 7–20)
BUN BLDV-MCNC: 57 MG/DL (ref 7–20)
C-REACTIVE PROTEIN: 153.1 MG/L (ref 0–5.1)
C-REACTIVE PROTEIN: 205.1 MG/L (ref 0–5.1)
C-REACTIVE PROTEIN: 25.7 MG/L (ref 0–5.1)
CALCIUM SERPL-MCNC: 8 MG/DL (ref 8.3–10.6)
CALCIUM SERPL-MCNC: 8.2 MG/DL (ref 8.3–10.6)
CALCIUM SERPL-MCNC: 8.3 MG/DL (ref 8.3–10.6)
CALCIUM SERPL-MCNC: 8.4 MG/DL (ref 8.3–10.6)
CALCIUM SERPL-MCNC: 8.5 MG/DL (ref 8.3–10.6)
CALCIUM SERPL-MCNC: 8.6 MG/DL (ref 8.3–10.6)
CALCIUM SERPL-MCNC: 8.7 MG/DL (ref 8.3–10.6)
CALCIUM SERPL-MCNC: 8.8 MG/DL (ref 8.3–10.6)
CALCIUM SERPL-MCNC: 8.9 MG/DL (ref 8.3–10.6)
CALCIUM SERPL-MCNC: 9 MG/DL (ref 8.3–10.6)
CALCIUM SERPL-MCNC: 9.1 MG/DL (ref 8.3–10.6)
CALCIUM SERPL-MCNC: 9.1 MG/DL (ref 8.3–10.6)
CALCIUM SERPL-MCNC: 9.2 MG/DL (ref 8.3–10.6)
CALCIUM SERPL-MCNC: 9.3 MG/DL (ref 8.3–10.6)
CALCIUM SERPL-MCNC: 9.4 MG/DL (ref 8.3–10.6)
CANNABINOID SCREEN URINE: ABNORMAL
CARBOXYHEMOGLOBIN ARTERIAL: 1.5 % (ref 0–1.5)
CARBOXYHEMOGLOBIN ARTERIAL: 1.6 % (ref 0–1.5)
CARBOXYHEMOGLOBIN ARTERIAL: 1.7 % (ref 0–1.5)
CARBOXYHEMOGLOBIN ARTERIAL: 1.8 % (ref 0–1.5)
CARBOXYHEMOGLOBIN ARTERIAL: 2 % (ref 0–1.5)
CARBOXYHEMOGLOBIN: 1.6 % (ref 0–1.5)
CARBOXYHEMOGLOBIN: 1.7 % (ref 0–1.5)
CARBOXYHEMOGLOBIN: 2 % (ref 0–1.5)
CHLORIDE BLD-SCNC: 100 MMOL/L (ref 99–110)
CHLORIDE BLD-SCNC: 101 MMOL/L (ref 99–110)
CHLORIDE BLD-SCNC: 101 MMOL/L (ref 99–110)
CHLORIDE BLD-SCNC: 102 MMOL/L (ref 99–110)
CHLORIDE BLD-SCNC: 103 MMOL/L (ref 99–110)
CHLORIDE BLD-SCNC: 104 MMOL/L (ref 99–110)
CHLORIDE BLD-SCNC: 105 MMOL/L (ref 99–110)
CHLORIDE BLD-SCNC: 106 MMOL/L (ref 99–110)
CHLORIDE BLD-SCNC: 107 MMOL/L (ref 99–110)
CHLORIDE BLD-SCNC: 107 MMOL/L (ref 99–110)
CHLORIDE BLD-SCNC: 108 MMOL/L (ref 99–110)
CHLORIDE BLD-SCNC: 109 MMOL/L (ref 99–110)
CHLORIDE BLD-SCNC: 111 MMOL/L (ref 99–110)
CHLORIDE BLD-SCNC: 96 MMOL/L (ref 99–110)
CHLORIDE BLD-SCNC: 96 MMOL/L (ref 99–110)
CHLORIDE BLD-SCNC: 97 MMOL/L (ref 99–110)
CHLORIDE BLD-SCNC: 97 MMOL/L (ref 99–110)
CHLORIDE BLD-SCNC: 98 MMOL/L (ref 99–110)
CHLORIDE BLD-SCNC: 99 MMOL/L (ref 99–110)
CLARITY: ABNORMAL
CLARITY: CLEAR
CLARITY: CLEAR
CO2: 20 MMOL/L (ref 21–32)
CO2: 20 MMOL/L (ref 21–32)
CO2: 22 MMOL/L (ref 21–32)
CO2: 22 MMOL/L (ref 21–32)
CO2: 23 MMOL/L (ref 21–32)
CO2: 24 MMOL/L (ref 21–32)
CO2: 25 MMOL/L (ref 21–32)
CO2: 26 MMOL/L (ref 21–32)
CO2: 27 MMOL/L (ref 21–32)
CO2: 28 MMOL/L (ref 21–32)
CO2: 29 MMOL/L (ref 21–32)
CO2: 30 MMOL/L (ref 21–32)
CO2: 31 MMOL/L (ref 21–32)
CO2: 32 MMOL/L (ref 21–32)
CO2: 32 MMOL/L (ref 21–32)
CO2: 33 MMOL/L (ref 21–32)
CO2: 33 MMOL/L (ref 21–32)
CO2: 34 MMOL/L (ref 21–32)
CO2: 36 MMOL/L (ref 21–32)
COCAINE METABOLITE SCREEN URINE: ABNORMAL
COLOR: YELLOW
CREAT SERPL-MCNC: 1.4 MG/DL (ref 0.6–1.1)
CREAT SERPL-MCNC: 1.5 MG/DL (ref 0.6–1.1)
CREAT SERPL-MCNC: 1.6 MG/DL (ref 0.6–1.1)
CREAT SERPL-MCNC: 1.7 MG/DL (ref 0.6–1.1)
CREAT SERPL-MCNC: 1.8 MG/DL (ref 0.6–1.1)
CREAT SERPL-MCNC: 1.9 MG/DL (ref 0.6–1.1)
CREAT SERPL-MCNC: 2 MG/DL (ref 0.6–1.1)
CREAT SERPL-MCNC: 2.1 MG/DL (ref 0.6–1.1)
CREAT SERPL-MCNC: 2.2 MG/DL (ref 0.6–1.1)
CREAT SERPL-MCNC: 2.3 MG/DL (ref 0.6–1.1)
CREAT SERPL-MCNC: 2.4 MG/DL (ref 0.6–1.1)
CREAT SERPL-MCNC: 2.4 MG/DL (ref 0.6–1.1)
CREAT SERPL-MCNC: 2.5 MG/DL (ref 0.6–1.1)
CREAT SERPL-MCNC: 2.8 MG/DL (ref 0.6–1.1)
CREAT SERPL-MCNC: 2.9 MG/DL (ref 0.6–1.1)
CREAT SERPL-MCNC: 3 MG/DL (ref 0.6–1.1)
CREAT SERPL-MCNC: 3.1 MG/DL (ref 0.6–1.1)
CREATININE URINE: 25.7 MG/DL (ref 28–259)
CREATININE URINE: 34.9 MG/DL (ref 28–259)
CREATININE URINE: 40.9 MG/DL (ref 28–259)
CREATININE URINE: 50 MG/DL (ref 28–259)
CREATININE URINE: 54.9 MG/DL (ref 28–259)
CREATININE URINE: 94.9 MG/DL (ref 28–259)
CULTURE SURGICAL: NORMAL
CULTURE, BLOOD 2: NORMAL
D DIMER: 527 NG/ML DDU (ref 0–229)
DAT IGG CAPTURE: NORMAL
DESCRIPTION BLOOD BANK: NORMAL
EKG ATRIAL RATE: 118 BPM
EKG ATRIAL RATE: 56 BPM
EKG ATRIAL RATE: 65 BPM
EKG ATRIAL RATE: 66 BPM
EKG ATRIAL RATE: 68 BPM
EKG ATRIAL RATE: 71 BPM
EKG ATRIAL RATE: 75 BPM
EKG ATRIAL RATE: 76 BPM
EKG ATRIAL RATE: 79 BPM
EKG DIAGNOSIS: NORMAL
EKG P AXIS: 18 DEGREES
EKG P AXIS: 19 DEGREES
EKG P AXIS: 34 DEGREES
EKG P AXIS: 36 DEGREES
EKG P AXIS: 38 DEGREES
EKG P AXIS: 52 DEGREES
EKG P AXIS: 53 DEGREES
EKG P AXIS: 58 DEGREES
EKG P AXIS: 62 DEGREES
EKG P-R INTERVAL: 126 MS
EKG P-R INTERVAL: 136 MS
EKG P-R INTERVAL: 144 MS
EKG P-R INTERVAL: 152 MS
EKG P-R INTERVAL: 156 MS
EKG P-R INTERVAL: 160 MS
EKG P-R INTERVAL: 164 MS
EKG P-R INTERVAL: 172 MS
EKG P-R INTERVAL: 182 MS
EKG Q-T INTERVAL: 342 MS
EKG Q-T INTERVAL: 366 MS
EKG Q-T INTERVAL: 400 MS
EKG Q-T INTERVAL: 400 MS
EKG Q-T INTERVAL: 418 MS
EKG Q-T INTERVAL: 420 MS
EKG Q-T INTERVAL: 430 MS
EKG Q-T INTERVAL: 444 MS
EKG Q-T INTERVAL: 444 MS
EKG QRS DURATION: 72 MS
EKG QRS DURATION: 80 MS
EKG QRS DURATION: 82 MS
EKG QRS DURATION: 84 MS
EKG QRS DURATION: 86 MS
EKG QRS DURATION: 92 MS
EKG QRS DURATION: 94 MS
EKG QTC CALCULATION (BAZETT): 411 MS
EKG QTC CALCULATION (BAZETT): 419 MS
EKG QTC CALCULATION (BAZETT): 428 MS
EKG QTC CALCULATION (BAZETT): 446 MS
EKG QTC CALCULATION (BAZETT): 446 MS
EKG QTC CALCULATION (BAZETT): 447 MS
EKG QTC CALCULATION (BAZETT): 454 MS
EKG QTC CALCULATION (BAZETT): 479 MS
EKG QTC CALCULATION (BAZETT): 515 MS
EKG R AXIS: 15 DEGREES
EKG R AXIS: 15 DEGREES
EKG R AXIS: 16 DEGREES
EKG R AXIS: 16 DEGREES
EKG R AXIS: 19 DEGREES
EKG R AXIS: 24 DEGREES
EKG R AXIS: 39 DEGREES
EKG R AXIS: 45 DEGREES
EKG R AXIS: 49 DEGREES
EKG T AXIS: 16 DEGREES
EKG T AXIS: 24 DEGREES
EKG T AXIS: 24 DEGREES
EKG T AXIS: 26 DEGREES
EKG T AXIS: 30 DEGREES
EKG T AXIS: 37 DEGREES
EKG T AXIS: 55 DEGREES
EKG T AXIS: 65 DEGREES
EKG T AXIS: 82 DEGREES
EKG VENTRICULAR RATE: 118 BPM
EKG VENTRICULAR RATE: 56 BPM
EKG VENTRICULAR RATE: 65 BPM
EKG VENTRICULAR RATE: 66 BPM
EKG VENTRICULAR RATE: 68 BPM
EKG VENTRICULAR RATE: 71 BPM
EKG VENTRICULAR RATE: 75 BPM
EKG VENTRICULAR RATE: 76 BPM
EKG VENTRICULAR RATE: 81 BPM
EOSINOPHILS ABSOLUTE: 0 K/UL (ref 0–0.6)
EOSINOPHILS ABSOLUTE: 0.1 K/UL (ref 0–0.6)
EOSINOPHILS ABSOLUTE: 0.2 K/UL (ref 0–0.6)
EOSINOPHILS ABSOLUTE: 0.3 K/UL (ref 0–0.6)
EOSINOPHILS ABSOLUTE: 0.4 /ΜL
EOSINOPHILS ABSOLUTE: 0.4 K/UL (ref 0–0.6)
EOSINOPHILS ABSOLUTE: 0.5 K/UL (ref 0–0.6)
EOSINOPHILS ABSOLUTE: 0.6 K/UL (ref 0–0.6)
EOSINOPHILS ABSOLUTE: 0.7 K/UL (ref 0–0.6)
EOSINOPHILS ABSOLUTE: 0.8 K/UL (ref 0–0.6)
EOSINOPHILS ABSOLUTE: 0.9 K/UL (ref 0–0.6)
EOSINOPHILS RELATIVE PERCENT: 0.2 %
EOSINOPHILS RELATIVE PERCENT: 0.4 %
EOSINOPHILS RELATIVE PERCENT: 1.8 %
EOSINOPHILS RELATIVE PERCENT: 1.8 %
EOSINOPHILS RELATIVE PERCENT: 2 %
EOSINOPHILS RELATIVE PERCENT: 2.1 %
EOSINOPHILS RELATIVE PERCENT: 2.1 %
EOSINOPHILS RELATIVE PERCENT: 2.3 %
EOSINOPHILS RELATIVE PERCENT: 2.4 %
EOSINOPHILS RELATIVE PERCENT: 2.6 %
EOSINOPHILS RELATIVE PERCENT: 2.7 %
EOSINOPHILS RELATIVE PERCENT: 2.8 %
EOSINOPHILS RELATIVE PERCENT: 2.8 %
EOSINOPHILS RELATIVE PERCENT: 3.1 %
EOSINOPHILS RELATIVE PERCENT: 3.1 %
EOSINOPHILS RELATIVE PERCENT: 3.2 %
EOSINOPHILS RELATIVE PERCENT: 3.2 %
EOSINOPHILS RELATIVE PERCENT: 3.3 %
EOSINOPHILS RELATIVE PERCENT: 3.4 %
EOSINOPHILS RELATIVE PERCENT: 3.6 %
EOSINOPHILS RELATIVE PERCENT: 3.7 %
EOSINOPHILS RELATIVE PERCENT: 3.7 %
EOSINOPHILS RELATIVE PERCENT: 3.9 %
EOSINOPHILS RELATIVE PERCENT: 4 %
EOSINOPHILS RELATIVE PERCENT: 4.2 %
EOSINOPHILS RELATIVE PERCENT: 4.4 %
EOSINOPHILS RELATIVE PERCENT: 4.5 %
EOSINOPHILS RELATIVE PERCENT: 4.5 %
EOSINOPHILS RELATIVE PERCENT: 5 %
EOSINOPHILS RELATIVE PERCENT: 5.1 %
EOSINOPHILS RELATIVE PERCENT: 5.2 %
EOSINOPHILS RELATIVE PERCENT: 5.6 %
EOSINOPHILS RELATIVE PERCENT: 5.7 %
EOSINOPHILS RELATIVE PERCENT: 5.7 %
EOSINOPHILS RELATIVE PERCENT: 6 %
EOSINOPHILS RELATIVE PERCENT: 6.5 %
EOSINOPHILS RELATIVE PERCENT: 6.9 %
EOSINOPHILS RELATIVE PERCENT: 7 %
EOSINOPHILS RELATIVE PERCENT: 7.5 %
EOSINOPHILS RELATIVE PERCENT: 7.6 %
EOSINOPHILS RELATIVE PERCENT: 7.6 %
EOSINOPHILS RELATIVE PERCENT: 7.9 %
EOSINOPHILS RELATIVE PERCENT: 7.9 %
EOSINOPHILS RELATIVE PERCENT: 8 %
EOSINOPHILS RELATIVE PERCENT: 8 %
EOSINOPHILS RELATIVE PERCENT: 8.4 %
EOSINOPHILS RELATIVE PERCENT: 8.5 %
EOSINOPHILS RELATIVE PERCENT: 9 %
EPITHELIAL CELLS, UA: ABNORMAL /HPF (ref 0–5)
EPITHELIAL CELLS, UA: ABNORMAL /HPF (ref 0–5)
ESTIMATED AVERAGE GLUCOSE: 148.5 MG/DL
ESTIMATED AVERAGE GLUCOSE: 191.5 MG/DL
ESTIMATED AVERAGE GLUCOSE: 205.9 MG/DL
ETHANOL: NORMAL MG/DL (ref 0–0.08)
FERRITIN: 17.9 NG/ML (ref 15–150)
FERRITIN: 183.9 NG/ML (ref 15–150)
FERRITIN: 94.2 NG/ML (ref 15–150)
FUNGUS (MYCOLOGY) CULTURE: NORMAL
FUNGUS STAIN: NORMAL
GAMMA GLOBULIN: 1.2 G/DL (ref 0.6–1.8)
GFR AFRICAN AMERICAN: 19
GFR AFRICAN AMERICAN: 19
GFR AFRICAN AMERICAN: 20
GFR AFRICAN AMERICAN: 21
GFR AFRICAN AMERICAN: 24
GFR AFRICAN AMERICAN: 25
GFR AFRICAN AMERICAN: 25
GFR AFRICAN AMERICAN: 26
GFR AFRICAN AMERICAN: 28
GFR AFRICAN AMERICAN: 29
GFR AFRICAN AMERICAN: 31
GFR AFRICAN AMERICAN: 33
GFR AFRICAN AMERICAN: 35
GFR AFRICAN AMERICAN: 37
GFR AFRICAN AMERICAN: 40
GFR AFRICAN AMERICAN: 43
GFR AFRICAN AMERICAN: 47
GFR NON-AFRICAN AMERICAN: 15
GFR NON-AFRICAN AMERICAN: 16
GFR NON-AFRICAN AMERICAN: 17
GFR NON-AFRICAN AMERICAN: 17
GFR NON-AFRICAN AMERICAN: 20
GFR NON-AFRICAN AMERICAN: 21
GFR NON-AFRICAN AMERICAN: 21
GFR NON-AFRICAN AMERICAN: 22
GFR NON-AFRICAN AMERICAN: 23
GFR NON-AFRICAN AMERICAN: 24
GFR NON-AFRICAN AMERICAN: 26
GFR NON-AFRICAN AMERICAN: 27
GFR NON-AFRICAN AMERICAN: 29
GFR NON-AFRICAN AMERICAN: 31
GFR NON-AFRICAN AMERICAN: 33
GFR NON-AFRICAN AMERICAN: 36
GFR NON-AFRICAN AMERICAN: 39
GLOBULIN: 3.5 G/DL
GLOBULIN: 3.6 G/DL
GLOBULIN: 3.6 G/DL
GLOBULIN: 4 G/DL
GLOBULIN: 4 G/DL
GLUCOSE BLD-MCNC: 100 MG/DL (ref 70–99)
GLUCOSE BLD-MCNC: 101 MG/DL (ref 70–99)
GLUCOSE BLD-MCNC: 102 MG/DL (ref 70–99)
GLUCOSE BLD-MCNC: 102 MG/DL (ref 70–99)
GLUCOSE BLD-MCNC: 103 MG/DL (ref 70–99)
GLUCOSE BLD-MCNC: 104 MG/DL (ref 70–99)
GLUCOSE BLD-MCNC: 106 MG/DL (ref 70–99)
GLUCOSE BLD-MCNC: 108 MG/DL (ref 70–99)
GLUCOSE BLD-MCNC: 109 MG/DL (ref 70–99)
GLUCOSE BLD-MCNC: 109 MG/DL (ref 70–99)
GLUCOSE BLD-MCNC: 110 MG/DL (ref 70–99)
GLUCOSE BLD-MCNC: 111 MG/DL (ref 70–99)
GLUCOSE BLD-MCNC: 112 MG/DL (ref 70–99)
GLUCOSE BLD-MCNC: 116 MG/DL (ref 70–99)
GLUCOSE BLD-MCNC: 118 MG/DL (ref 70–99)
GLUCOSE BLD-MCNC: 118 MG/DL (ref 70–99)
GLUCOSE BLD-MCNC: 119 MG/DL (ref 70–99)
GLUCOSE BLD-MCNC: 120 MG/DL (ref 70–99)
GLUCOSE BLD-MCNC: 121 MG/DL (ref 70–99)
GLUCOSE BLD-MCNC: 123 MG/DL (ref 70–99)
GLUCOSE BLD-MCNC: 124 MG/DL (ref 70–99)
GLUCOSE BLD-MCNC: 125 MG/DL (ref 70–99)
GLUCOSE BLD-MCNC: 127 MG/DL (ref 70–99)
GLUCOSE BLD-MCNC: 127 MG/DL (ref 70–99)
GLUCOSE BLD-MCNC: 130 MG/DL (ref 70–99)
GLUCOSE BLD-MCNC: 131 MG/DL (ref 70–99)
GLUCOSE BLD-MCNC: 132 MG/DL (ref 70–99)
GLUCOSE BLD-MCNC: 132 MG/DL (ref 70–99)
GLUCOSE BLD-MCNC: 133 MG/DL (ref 70–99)
GLUCOSE BLD-MCNC: 133 MG/DL (ref 70–99)
GLUCOSE BLD-MCNC: 134 MG/DL (ref 70–99)
GLUCOSE BLD-MCNC: 134 MG/DL (ref 70–99)
GLUCOSE BLD-MCNC: 137 MG/DL (ref 70–99)
GLUCOSE BLD-MCNC: 137 MG/DL (ref 70–99)
GLUCOSE BLD-MCNC: 139 MG/DL (ref 70–99)
GLUCOSE BLD-MCNC: 141 MG/DL (ref 70–99)
GLUCOSE BLD-MCNC: 142 MG/DL (ref 70–99)
GLUCOSE BLD-MCNC: 142 MG/DL (ref 70–99)
GLUCOSE BLD-MCNC: 143 MG/DL (ref 70–99)
GLUCOSE BLD-MCNC: 144 MG/DL (ref 70–99)
GLUCOSE BLD-MCNC: 146 MG/DL (ref 70–99)
GLUCOSE BLD-MCNC: 147 MG/DL (ref 70–99)
GLUCOSE BLD-MCNC: 148 MG/DL (ref 70–99)
GLUCOSE BLD-MCNC: 151 MG/DL (ref 70–99)
GLUCOSE BLD-MCNC: 152 MG/DL (ref 70–99)
GLUCOSE BLD-MCNC: 157 MG/DL (ref 70–99)
GLUCOSE BLD-MCNC: 159 MG/DL (ref 70–99)
GLUCOSE BLD-MCNC: 159 MG/DL (ref 70–99)
GLUCOSE BLD-MCNC: 161 MG/DL (ref 70–99)
GLUCOSE BLD-MCNC: 162 MG/DL (ref 70–99)
GLUCOSE BLD-MCNC: 162 MG/DL (ref 70–99)
GLUCOSE BLD-MCNC: 163 MG/DL (ref 70–99)
GLUCOSE BLD-MCNC: 164 MG/DL (ref 70–99)
GLUCOSE BLD-MCNC: 164 MG/DL (ref 70–99)
GLUCOSE BLD-MCNC: 165 MG/DL (ref 70–99)
GLUCOSE BLD-MCNC: 168 MG/DL (ref 70–99)
GLUCOSE BLD-MCNC: 169 MG/DL (ref 70–99)
GLUCOSE BLD-MCNC: 169 MG/DL (ref 70–99)
GLUCOSE BLD-MCNC: 170 MG/DL (ref 70–99)
GLUCOSE BLD-MCNC: 171 MG/DL (ref 70–99)
GLUCOSE BLD-MCNC: 172 MG/DL (ref 70–99)
GLUCOSE BLD-MCNC: 173 MG/DL (ref 70–99)
GLUCOSE BLD-MCNC: 176 MG/DL (ref 70–99)
GLUCOSE BLD-MCNC: 176 MG/DL (ref 70–99)
GLUCOSE BLD-MCNC: 177 MG/DL (ref 70–99)
GLUCOSE BLD-MCNC: 177 MG/DL (ref 70–99)
GLUCOSE BLD-MCNC: 178 MG/DL (ref 70–99)
GLUCOSE BLD-MCNC: 179 MG/DL (ref 70–99)
GLUCOSE BLD-MCNC: 181 MG/DL (ref 70–99)
GLUCOSE BLD-MCNC: 183 MG/DL (ref 70–99)
GLUCOSE BLD-MCNC: 184 MG/DL (ref 70–99)
GLUCOSE BLD-MCNC: 186 MG/DL (ref 70–99)
GLUCOSE BLD-MCNC: 187 MG/DL (ref 70–99)
GLUCOSE BLD-MCNC: 190 MG/DL (ref 70–99)
GLUCOSE BLD-MCNC: 191 MG/DL (ref 70–99)
GLUCOSE BLD-MCNC: 192 MG/DL (ref 70–99)
GLUCOSE BLD-MCNC: 195 MG/DL (ref 70–99)
GLUCOSE BLD-MCNC: 195 MG/DL (ref 70–99)
GLUCOSE BLD-MCNC: 196 MG/DL (ref 70–99)
GLUCOSE BLD-MCNC: 198 MG/DL (ref 70–99)
GLUCOSE BLD-MCNC: 200 MG/DL (ref 70–99)
GLUCOSE BLD-MCNC: 202 MG/DL (ref 70–99)
GLUCOSE BLD-MCNC: 202 MG/DL (ref 70–99)
GLUCOSE BLD-MCNC: 203 MG/DL (ref 70–99)
GLUCOSE BLD-MCNC: 205 MG/DL (ref 70–99)
GLUCOSE BLD-MCNC: 205 MG/DL (ref 70–99)
GLUCOSE BLD-MCNC: 207 MG/DL (ref 70–99)
GLUCOSE BLD-MCNC: 208 MG/DL (ref 70–99)
GLUCOSE BLD-MCNC: 208 MG/DL (ref 70–99)
GLUCOSE BLD-MCNC: 211 MG/DL (ref 70–99)
GLUCOSE BLD-MCNC: 212 MG/DL (ref 70–99)
GLUCOSE BLD-MCNC: 213 MG/DL (ref 70–99)
GLUCOSE BLD-MCNC: 213 MG/DL (ref 70–99)
GLUCOSE BLD-MCNC: 215 MG/DL (ref 70–99)
GLUCOSE BLD-MCNC: 216 MG/DL (ref 70–99)
GLUCOSE BLD-MCNC: 217 MG/DL (ref 70–99)
GLUCOSE BLD-MCNC: 218 MG/DL (ref 70–99)
GLUCOSE BLD-MCNC: 220 MG/DL (ref 70–99)
GLUCOSE BLD-MCNC: 222 MG/DL (ref 70–99)
GLUCOSE BLD-MCNC: 224 MG/DL (ref 70–99)
GLUCOSE BLD-MCNC: 225 MG/DL (ref 70–99)
GLUCOSE BLD-MCNC: 227 MG/DL (ref 70–99)
GLUCOSE BLD-MCNC: 228 MG/DL (ref 70–99)
GLUCOSE BLD-MCNC: 229 MG/DL (ref 70–99)
GLUCOSE BLD-MCNC: 230 MG/DL (ref 70–99)
GLUCOSE BLD-MCNC: 231 MG/DL (ref 70–99)
GLUCOSE BLD-MCNC: 234 MG/DL (ref 70–99)
GLUCOSE BLD-MCNC: 236 MG/DL (ref 70–99)
GLUCOSE BLD-MCNC: 237 MG/DL (ref 70–99)
GLUCOSE BLD-MCNC: 237 MG/DL (ref 70–99)
GLUCOSE BLD-MCNC: 238 MG/DL (ref 70–99)
GLUCOSE BLD-MCNC: 238 MG/DL (ref 70–99)
GLUCOSE BLD-MCNC: 240 MG/DL (ref 70–99)
GLUCOSE BLD-MCNC: 243 MG/DL (ref 70–99)
GLUCOSE BLD-MCNC: 245 MG/DL (ref 70–99)
GLUCOSE BLD-MCNC: 246 MG/DL (ref 70–99)
GLUCOSE BLD-MCNC: 248 MG/DL (ref 70–99)
GLUCOSE BLD-MCNC: 248 MG/DL (ref 70–99)
GLUCOSE BLD-MCNC: 249 MG/DL (ref 70–99)
GLUCOSE BLD-MCNC: 250 MG/DL (ref 70–99)
GLUCOSE BLD-MCNC: 252 MG/DL (ref 70–99)
GLUCOSE BLD-MCNC: 252 MG/DL (ref 70–99)
GLUCOSE BLD-MCNC: 253 MG/DL (ref 70–99)
GLUCOSE BLD-MCNC: 255 MG/DL (ref 70–99)
GLUCOSE BLD-MCNC: 258 MG/DL (ref 70–99)
GLUCOSE BLD-MCNC: 259 MG/DL (ref 70–99)
GLUCOSE BLD-MCNC: 260 MG/DL (ref 70–99)
GLUCOSE BLD-MCNC: 260 MG/DL (ref 70–99)
GLUCOSE BLD-MCNC: 262 MG/DL (ref 70–99)
GLUCOSE BLD-MCNC: 263 MG/DL (ref 70–99)
GLUCOSE BLD-MCNC: 263 MG/DL (ref 70–99)
GLUCOSE BLD-MCNC: 267 MG/DL (ref 70–99)
GLUCOSE BLD-MCNC: 269 MG/DL (ref 70–99)
GLUCOSE BLD-MCNC: 269 MG/DL (ref 70–99)
GLUCOSE BLD-MCNC: 270 MG/DL (ref 70–99)
GLUCOSE BLD-MCNC: 272 MG/DL (ref 70–99)
GLUCOSE BLD-MCNC: 274 MG/DL (ref 70–99)
GLUCOSE BLD-MCNC: 276 MG/DL (ref 70–99)
GLUCOSE BLD-MCNC: 277 MG/DL (ref 70–99)
GLUCOSE BLD-MCNC: 279 MG/DL (ref 70–99)
GLUCOSE BLD-MCNC: 282 MG/DL (ref 70–99)
GLUCOSE BLD-MCNC: 283 MG/DL (ref 70–99)
GLUCOSE BLD-MCNC: 283 MG/DL (ref 70–99)
GLUCOSE BLD-MCNC: 285 MG/DL (ref 70–99)
GLUCOSE BLD-MCNC: 286 MG/DL (ref 70–99)
GLUCOSE BLD-MCNC: 289 MG/DL (ref 70–99)
GLUCOSE BLD-MCNC: 293 MG/DL (ref 70–99)
GLUCOSE BLD-MCNC: 294 MG/DL (ref 70–99)
GLUCOSE BLD-MCNC: 297 MG/DL (ref 70–99)
GLUCOSE BLD-MCNC: 298 MG/DL (ref 70–99)
GLUCOSE BLD-MCNC: 300 MG/DL (ref 70–99)
GLUCOSE BLD-MCNC: 300 MG/DL (ref 70–99)
GLUCOSE BLD-MCNC: 301 MG/DL (ref 70–99)
GLUCOSE BLD-MCNC: 301 MG/DL (ref 70–99)
GLUCOSE BLD-MCNC: 305 MG/DL (ref 70–99)
GLUCOSE BLD-MCNC: 306 MG/DL (ref 70–99)
GLUCOSE BLD-MCNC: 308 MG/DL (ref 70–99)
GLUCOSE BLD-MCNC: 311 MG/DL (ref 70–99)
GLUCOSE BLD-MCNC: 311 MG/DL (ref 70–99)
GLUCOSE BLD-MCNC: 332 MG/DL (ref 70–99)
GLUCOSE BLD-MCNC: 333 MG/DL (ref 70–99)
GLUCOSE BLD-MCNC: 336 MG/DL (ref 70–99)
GLUCOSE BLD-MCNC: 341 MG/DL (ref 70–99)
GLUCOSE BLD-MCNC: 35 MG/DL (ref 70–99)
GLUCOSE BLD-MCNC: 354 MG/DL (ref 70–99)
GLUCOSE BLD-MCNC: 354 MG/DL (ref 70–99)
GLUCOSE BLD-MCNC: 362 MG/DL (ref 70–99)
GLUCOSE BLD-MCNC: 372 MG/DL (ref 70–99)
GLUCOSE BLD-MCNC: 381 MG/DL (ref 70–99)
GLUCOSE BLD-MCNC: 383 MG/DL (ref 70–99)
GLUCOSE BLD-MCNC: 384 MG/DL (ref 70–99)
GLUCOSE BLD-MCNC: 39 MG/DL (ref 70–99)
GLUCOSE BLD-MCNC: 418 MG/DL (ref 70–99)
GLUCOSE BLD-MCNC: 46 MG/DL (ref 70–99)
GLUCOSE BLD-MCNC: 50 MG/DL (ref 70–99)
GLUCOSE BLD-MCNC: 52 MG/DL (ref 70–99)
GLUCOSE BLD-MCNC: 58 MG/DL (ref 70–99)
GLUCOSE BLD-MCNC: 61 MG/DL (ref 70–99)
GLUCOSE BLD-MCNC: 62 MG/DL (ref 70–99)
GLUCOSE BLD-MCNC: 65 MG/DL (ref 70–99)
GLUCOSE BLD-MCNC: 67 MG/DL (ref 70–99)
GLUCOSE BLD-MCNC: 67 MG/DL (ref 70–99)
GLUCOSE BLD-MCNC: 68 MG/DL (ref 70–99)
GLUCOSE BLD-MCNC: 69 MG/DL (ref 70–99)
GLUCOSE BLD-MCNC: 71 MG/DL (ref 70–99)
GLUCOSE BLD-MCNC: 71 MG/DL (ref 70–99)
GLUCOSE BLD-MCNC: 73 MG/DL (ref 70–99)
GLUCOSE BLD-MCNC: 73 MG/DL (ref 70–99)
GLUCOSE BLD-MCNC: 76 MG/DL (ref 70–99)
GLUCOSE BLD-MCNC: 78 MG/DL (ref 70–99)
GLUCOSE BLD-MCNC: 78 MG/DL (ref 70–99)
GLUCOSE BLD-MCNC: 82 MG/DL (ref 70–99)
GLUCOSE BLD-MCNC: 82 MG/DL (ref 70–99)
GLUCOSE BLD-MCNC: 83 MG/DL (ref 70–99)
GLUCOSE BLD-MCNC: 84 MG/DL (ref 70–99)
GLUCOSE BLD-MCNC: 84 MG/DL (ref 70–99)
GLUCOSE BLD-MCNC: 85 MG/DL (ref 70–99)
GLUCOSE BLD-MCNC: 85 MG/DL (ref 70–99)
GLUCOSE BLD-MCNC: 86 MG/DL (ref 70–99)
GLUCOSE BLD-MCNC: 87 MG/DL (ref 70–99)
GLUCOSE BLD-MCNC: 87 MG/DL (ref 70–99)
GLUCOSE BLD-MCNC: 90 MG/DL (ref 70–99)
GLUCOSE BLD-MCNC: 91 MG/DL (ref 70–99)
GLUCOSE BLD-MCNC: 91 MG/DL (ref 70–99)
GLUCOSE BLD-MCNC: 92 MG/DL (ref 70–99)
GLUCOSE BLD-MCNC: 95 MG/DL (ref 70–99)
GLUCOSE BLD-MCNC: 96 MG/DL (ref 70–99)
GLUCOSE BLD-MCNC: 96 MG/DL (ref 70–99)
GLUCOSE BLD-MCNC: 97 MG/DL (ref 70–99)
GLUCOSE BLD-MCNC: 99 MG/DL (ref 70–99)
GLUCOSE BLD-MCNC: >600 MG/DL (ref 70–99)
GLUCOSE URINE: 100 MG/DL
GLUCOSE URINE: 100 MG/DL
GLUCOSE URINE: NEGATIVE MG/DL
GRAM STAIN RESULT: ABNORMAL
GRAM STAIN RESULT: NORMAL
HBA1C MFR BLD: 6.8 %
HBA1C MFR BLD: 7.6 %
HBA1C MFR BLD: 8.3 %
HBA1C MFR BLD: 8.8 %
HCG QUALITATIVE: NEGATIVE
HCO3 ARTERIAL: 28 MMOL/L (ref 21–29)
HCO3 ARTERIAL: 29 MMOL/L (ref 21–29)
HCO3 ARTERIAL: 29 MMOL/L (ref 21–29)
HCO3 ARTERIAL: 32 MMOL/L (ref 21–29)
HCO3 ARTERIAL: 34 MMOL/L (ref 21–29)
HCO3 ARTERIAL: 34 MMOL/L (ref 21–29)
HCO3 ARTERIAL: 35 MMOL/L (ref 21–29)
HCO3 VENOUS: 26.2 MMOL/L (ref 24–28)
HCO3 VENOUS: 31.9 MMOL/L (ref 24–28)
HCO3 VENOUS: 32 MMOL/L (ref 24–28)
HCT VFR BLD CALC: 20.1 % (ref 36–48)
HCT VFR BLD CALC: 21 % (ref 36–48)
HCT VFR BLD CALC: 21 % (ref 36–48)
HCT VFR BLD CALC: 21.3 % (ref 36–48)
HCT VFR BLD CALC: 21.6 % (ref 36–48)
HCT VFR BLD CALC: 21.7 % (ref 36–48)
HCT VFR BLD CALC: 21.8 % (ref 36–48)
HCT VFR BLD CALC: 21.8 % (ref 36–48)
HCT VFR BLD CALC: 22.5 % (ref 36–48)
HCT VFR BLD CALC: 22.6 % (ref 36–48)
HCT VFR BLD CALC: 22.6 % (ref 36–48)
HCT VFR BLD CALC: 22.7 % (ref 36–48)
HCT VFR BLD CALC: 22.9 % (ref 36–48)
HCT VFR BLD CALC: 22.9 % (ref 36–48)
HCT VFR BLD CALC: 23.1 % (ref 36–48)
HCT VFR BLD CALC: 23.1 % (ref 36–48)
HCT VFR BLD CALC: 23.3 % (ref 36–48)
HCT VFR BLD CALC: 23.3 % (ref 36–48)
HCT VFR BLD CALC: 23.4 % (ref 36–48)
HCT VFR BLD CALC: 23.4 % (ref 36–48)
HCT VFR BLD CALC: 23.5 % (ref 36–48)
HCT VFR BLD CALC: 23.6 % (ref 36–48)
HCT VFR BLD CALC: 23.7 % (ref 36–48)
HCT VFR BLD CALC: 23.8 % (ref 36–48)
HCT VFR BLD CALC: 23.9 % (ref 36–48)
HCT VFR BLD CALC: 24.3 % (ref 36–48)
HCT VFR BLD CALC: 24.3 % (ref 36–48)
HCT VFR BLD CALC: 24.6 % (ref 36–48)
HCT VFR BLD CALC: 24.8 % (ref 36–48)
HCT VFR BLD CALC: 24.9 % (ref 36–48)
HCT VFR BLD CALC: 25.1 % (ref 36–48)
HCT VFR BLD CALC: 25.1 % (ref 36–48)
HCT VFR BLD CALC: 25.2 % (ref 36–48)
HCT VFR BLD CALC: 25.3 % (ref 36–48)
HCT VFR BLD CALC: 25.5 % (ref 36–48)
HCT VFR BLD CALC: 25.8 % (ref 36–48)
HCT VFR BLD CALC: 26 % (ref 36–48)
HCT VFR BLD CALC: 26.1 % (ref 36–48)
HCT VFR BLD CALC: 26.8 % (ref 36–48)
HCT VFR BLD CALC: 26.8 % (ref 36–48)
HCT VFR BLD CALC: 27.2 % (ref 36–48)
HCT VFR BLD CALC: 27.3 % (ref 36–48)
HCT VFR BLD CALC: 27.4 % (ref 36–48)
HCT VFR BLD CALC: 27.5 % (ref 36–48)
HCT VFR BLD CALC: 27.7 % (ref 36–48)
HCT VFR BLD CALC: 27.8 % (ref 36–48)
HCT VFR BLD CALC: 27.9 % (ref 36–46)
HCT VFR BLD CALC: 27.9 % (ref 36–48)
HCT VFR BLD CALC: 28.1 % (ref 36–48)
HCT VFR BLD CALC: 28.2 % (ref 36–48)
HCT VFR BLD CALC: 28.5 % (ref 36–48)
HCT VFR BLD CALC: 28.6 % (ref 36–48)
HCT VFR BLD CALC: 28.8 % (ref 36–48)
HCT VFR BLD CALC: 29.9 % (ref 36–48)
HCT VFR BLD CALC: 29.9 % (ref 36–48)
HCT VFR BLD CALC: 30.3 % (ref 36–48)
HCT VFR BLD CALC: 31.2 % (ref 36–48)
HEMOGLOBIN, ART, EXTENDED: 6.1 G/DL
HEMOGLOBIN, ART, EXTENDED: 6.2 G/DL
HEMOGLOBIN, ART, EXTENDED: 7.2 G/DL
HEMOGLOBIN, ART, EXTENDED: 7.5 G/DL
HEMOGLOBIN, ART, EXTENDED: 8.3 G/DL
HEMOGLOBIN, ART, EXTENDED: 8.7 G/DL
HEMOGLOBIN, ART, EXTENDED: <5 G/DL
HEMOGLOBIN, VEN, REDUCED: 16.4 %
HEMOGLOBIN, VEN, REDUCED: 22.8 %
HEMOGLOBIN: 10 G/DL (ref 12–16)
HEMOGLOBIN: 10.2 G/DL (ref 12–16)
HEMOGLOBIN: 6.8 G/DL (ref 12–16)
HEMOGLOBIN: 6.8 G/DL (ref 12–16)
HEMOGLOBIN: 6.9 G/DL (ref 12–16)
HEMOGLOBIN: 7 G/DL (ref 12–16)
HEMOGLOBIN: 7.2 G/DL (ref 12–16)
HEMOGLOBIN: 7.2 G/DL (ref 12–16)
HEMOGLOBIN: 7.3 G/DL (ref 12–16)
HEMOGLOBIN: 7.4 G/DL (ref 12–16)
HEMOGLOBIN: 7.4 G/DL (ref 12–16)
HEMOGLOBIN: 7.5 G/DL (ref 12–16)
HEMOGLOBIN: 7.6 G/DL (ref 12–16)
HEMOGLOBIN: 7.6 G/DL (ref 12–16)
HEMOGLOBIN: 7.7 G/DL (ref 12–16)
HEMOGLOBIN: 7.7 G/DL (ref 12–16)
HEMOGLOBIN: 7.8 G/DL (ref 12–16)
HEMOGLOBIN: 7.9 G/DL (ref 12–16)
HEMOGLOBIN: 8 G/DL (ref 12–16)
HEMOGLOBIN: 8 G/DL (ref 12–16)
HEMOGLOBIN: 8.1 G/DL (ref 12–16)
HEMOGLOBIN: 8.2 G/DL (ref 12–16)
HEMOGLOBIN: 8.2 G/DL (ref 12–16)
HEMOGLOBIN: 8.3 G/DL (ref 12–16)
HEMOGLOBIN: 8.4 G/DL (ref 12–16)
HEMOGLOBIN: 8.4 G/DL (ref 12–16)
HEMOGLOBIN: 8.5 G/DL (ref 12–16)
HEMOGLOBIN: 8.6 G/DL (ref 12–16)
HEMOGLOBIN: 8.8 G/DL (ref 12–16)
HEMOGLOBIN: 8.9 G/DL (ref 12–16)
HEMOGLOBIN: 9 G/DL (ref 12–16)
HEMOGLOBIN: 9.1 G/DL (ref 12–16)
HEMOGLOBIN: 9.1 G/DL (ref 12–16)
HEMOGLOBIN: 9.2 G/DL (ref 12–16)
HEMOGLOBIN: 9.3 G/DL (ref 12–16)
HEMOGLOBIN: 9.3 G/DL (ref 12–16)
HEMOGLOBIN: 9.4 G/DL (ref 12–16)
HEMOGLOBIN: 9.5 G/DL (ref 12–16)
HEMOGLOBIN: 9.8 G/DL (ref 12–16)
HEMOGLOBIN: 9.9 G/DL (ref 12–16)
IMMATURE RETIC FRACT: 0.45 (ref 0.21–0.37)
INR BLD: 0.93 (ref 0.88–1.12)
INR BLD: 0.98 (ref 0.88–1.12)
INR BLD: 1.3 (ref 0.88–1.12)
INR BLD: 1.34 (ref 0.88–1.12)
INR BLD: 1.35 (ref 0.88–1.12)
IRON SATURATION: 17 % (ref 15–50)
IRON SATURATION: 18 % (ref 15–50)
IRON SATURATION: 8 % (ref 15–50)
IRON SATURATION: 9 % (ref 15–50)
IRON: 16 UG/DL (ref 37–145)
IRON: 17 UG/DL (ref 37–145)
IRON: 43 UG/DL (ref 37–145)
IRON: 44 UG/DL (ref 37–145)
KAPPA, FREE LIGHT CHAINS, SERUM: 86.25 MG/L (ref 3.3–19.4)
KAPPA/LAMBDA RATIO: 1.58 (ref 0.26–1.65)
KAPPA/LAMBDA TEST COMMENT: ABNORMAL
KETONES, URINE: NEGATIVE MG/DL
LACTATE DEHYDROGENASE: 247 U/L (ref 100–190)
LACTIC ACID, SEPSIS: 0.6 MMOL/L (ref 0.4–1.9)
LACTIC ACID, SEPSIS: 0.7 MMOL/L (ref 0.4–1.9)
LACTIC ACID, SEPSIS: 0.9 MMOL/L (ref 0.4–1.9)
LACTIC ACID, SEPSIS: 1 MMOL/L (ref 0.4–1.9)
LACTIC ACID: 0.5 MMOL/L (ref 0.4–2)
LACTIC ACID: 0.8 MMOL/L (ref 0.4–2)
LACTIC ACID: 1 MMOL/L (ref 0.4–2)
LACTIC ACID: 1.6 MMOL/L (ref 0.4–2)
LACTIC ACID: 1.8 MMOL/L (ref 0.4–2)
LACTIC ACID: 4.5 MMOL/L (ref 0.4–2)
LAMBDA, FREE LIGHT CHAINS, SERUM: 54.5 MG/L (ref 5.71–26.3)
LEUKOCYTE ESTERASE, URINE: ABNORMAL
LEUKOCYTE ESTERASE, URINE: NEGATIVE
LEUKOCYTE ESTERASE, URINE: NEGATIVE
LIPASE: 8 U/L (ref 13–60)
LIPASE: 9 U/L (ref 13–60)
LV EF: 55 %
LVEF MODALITY: NORMAL
LYMPHOCYTES ABSOLUTE: 0.8 K/UL (ref 1–5.1)
LYMPHOCYTES ABSOLUTE: 0.9 K/UL (ref 1–5.1)
LYMPHOCYTES ABSOLUTE: 1 K/UL (ref 1–5.1)
LYMPHOCYTES ABSOLUTE: 1 K/UL (ref 1–5.1)
LYMPHOCYTES ABSOLUTE: 1.2 K/UL (ref 1–5.1)
LYMPHOCYTES ABSOLUTE: 1.3 K/UL (ref 1–5.1)
LYMPHOCYTES ABSOLUTE: 1.4 K/UL (ref 1–5.1)
LYMPHOCYTES ABSOLUTE: 1.5 K/UL (ref 1–5.1)
LYMPHOCYTES ABSOLUTE: 1.6 K/UL (ref 1–5.1)
LYMPHOCYTES ABSOLUTE: 1.7 K/UL (ref 1–5.1)
LYMPHOCYTES ABSOLUTE: 1.8 K/UL (ref 1–5.1)
LYMPHOCYTES ABSOLUTE: 1.8 K/UL (ref 1–5.1)
LYMPHOCYTES ABSOLUTE: 1.9 K/UL (ref 1–5.1)
LYMPHOCYTES ABSOLUTE: 2 /ΜL
LYMPHOCYTES ABSOLUTE: 2.1 K/UL (ref 1–5.1)
LYMPHOCYTES ABSOLUTE: 2.4 K/UL (ref 1–5.1)
LYMPHOCYTES ABSOLUTE: 3.1 K/UL (ref 1–5.1)
LYMPHOCYTES RELATIVE PERCENT: 12.5 %
LYMPHOCYTES RELATIVE PERCENT: 12.6 %
LYMPHOCYTES RELATIVE PERCENT: 13.8 %
LYMPHOCYTES RELATIVE PERCENT: 15 %
LYMPHOCYTES RELATIVE PERCENT: 15.3 %
LYMPHOCYTES RELATIVE PERCENT: 15.4 %
LYMPHOCYTES RELATIVE PERCENT: 15.7 %
LYMPHOCYTES RELATIVE PERCENT: 15.9 %
LYMPHOCYTES RELATIVE PERCENT: 16 %
LYMPHOCYTES RELATIVE PERCENT: 16.5 %
LYMPHOCYTES RELATIVE PERCENT: 16.6 %
LYMPHOCYTES RELATIVE PERCENT: 16.9 %
LYMPHOCYTES RELATIVE PERCENT: 17.4 %
LYMPHOCYTES RELATIVE PERCENT: 17.6 %
LYMPHOCYTES RELATIVE PERCENT: 17.6 %
LYMPHOCYTES RELATIVE PERCENT: 18.1 %
LYMPHOCYTES RELATIVE PERCENT: 18.1 %
LYMPHOCYTES RELATIVE PERCENT: 18.6 %
LYMPHOCYTES RELATIVE PERCENT: 18.9 %
LYMPHOCYTES RELATIVE PERCENT: 19.1 %
LYMPHOCYTES RELATIVE PERCENT: 19.6 %
LYMPHOCYTES RELATIVE PERCENT: 19.9 %
LYMPHOCYTES RELATIVE PERCENT: 20.8 %
LYMPHOCYTES RELATIVE PERCENT: 20.8 %
LYMPHOCYTES RELATIVE PERCENT: 21.2 %
LYMPHOCYTES RELATIVE PERCENT: 21.3 %
LYMPHOCYTES RELATIVE PERCENT: 21.4 %
LYMPHOCYTES RELATIVE PERCENT: 21.8 %
LYMPHOCYTES RELATIVE PERCENT: 22.1 %
LYMPHOCYTES RELATIVE PERCENT: 22.2 %
LYMPHOCYTES RELATIVE PERCENT: 22.2 %
LYMPHOCYTES RELATIVE PERCENT: 22.6 %
LYMPHOCYTES RELATIVE PERCENT: 22.9 %
LYMPHOCYTES RELATIVE PERCENT: 23 %
LYMPHOCYTES RELATIVE PERCENT: 23.1 %
LYMPHOCYTES RELATIVE PERCENT: 24 %
LYMPHOCYTES RELATIVE PERCENT: 24 %
LYMPHOCYTES RELATIVE PERCENT: 25.2 %
LYMPHOCYTES RELATIVE PERCENT: 26.1 %
LYMPHOCYTES RELATIVE PERCENT: 26.1 %
LYMPHOCYTES RELATIVE PERCENT: 26.3 %
LYMPHOCYTES RELATIVE PERCENT: 26.8 %
LYMPHOCYTES RELATIVE PERCENT: 27.2 %
LYMPHOCYTES RELATIVE PERCENT: 28.9 %
LYMPHOCYTES RELATIVE PERCENT: 30 %
LYMPHOCYTES RELATIVE PERCENT: 30.1 %
LYMPHOCYTES RELATIVE PERCENT: 30.4 %
LYMPHOCYTES RELATIVE PERCENT: 30.9 %
LYMPHOCYTES RELATIVE PERCENT: 31.3 %
LYMPHOCYTES RELATIVE PERCENT: 34.5 %
LYMPHOCYTES RELATIVE PERCENT: 5 %
LYMPHOCYTES RELATIVE PERCENT: 6.6 %
Lab: ABNORMAL
MAGNESIUM: 1.7 MG/DL (ref 1.8–2.4)
MAGNESIUM: 1.8 MG/DL (ref 1.8–2.4)
MAGNESIUM: 1.9 MG/DL (ref 1.8–2.4)
MAGNESIUM: 2 MG/DL (ref 1.8–2.4)
MAGNESIUM: 2.1 MG/DL (ref 1.8–2.4)
MAGNESIUM: 2.2 MG/DL (ref 1.8–2.4)
MAGNESIUM: 2.3 MG/DL (ref 1.8–2.4)
MAGNESIUM: 2.4 MG/DL (ref 1.8–2.4)
MCH RBC QN AUTO: 25 PG (ref 26–34)
MCH RBC QN AUTO: 25 PG (ref 26–34)
MCH RBC QN AUTO: 25.6 PG (ref 26–34)
MCH RBC QN AUTO: 25.7 PG (ref 26–34)
MCH RBC QN AUTO: 25.8 PG (ref 26–34)
MCH RBC QN AUTO: 25.8 PG (ref 26–34)
MCH RBC QN AUTO: 26 PG (ref 26–34)
MCH RBC QN AUTO: 26.1 PG (ref 26–34)
MCH RBC QN AUTO: 26.2 PG (ref 26–34)
MCH RBC QN AUTO: 26.4 PG (ref 26–34)
MCH RBC QN AUTO: 27.1 PG (ref 26–34)
MCH RBC QN AUTO: 27.5 PG (ref 26–34)
MCH RBC QN AUTO: 27.6 PG (ref 26–34)
MCH RBC QN AUTO: 27.7 PG (ref 26–34)
MCH RBC QN AUTO: 27.8 PG (ref 26–34)
MCH RBC QN AUTO: 27.8 PG (ref 26–34)
MCH RBC QN AUTO: 27.9 PG (ref 26–34)
MCH RBC QN AUTO: 27.9 PG (ref 26–34)
MCH RBC QN AUTO: 28 PG
MCH RBC QN AUTO: 28 PG (ref 26–34)
MCH RBC QN AUTO: 28.2 PG (ref 26–34)
MCH RBC QN AUTO: 28.3 PG (ref 26–34)
MCH RBC QN AUTO: 28.4 PG (ref 26–34)
MCH RBC QN AUTO: 28.4 PG (ref 26–34)
MCH RBC QN AUTO: 28.5 PG (ref 26–34)
MCH RBC QN AUTO: 28.6 PG (ref 26–34)
MCH RBC QN AUTO: 28.6 PG (ref 26–34)
MCH RBC QN AUTO: 28.7 PG (ref 26–34)
MCH RBC QN AUTO: 28.7 PG (ref 26–34)
MCH RBC QN AUTO: 28.8 PG (ref 26–34)
MCH RBC QN AUTO: 29 PG (ref 26–34)
MCH RBC QN AUTO: 29.1 PG (ref 26–34)
MCH RBC QN AUTO: 29.2 PG (ref 26–34)
MCHC RBC AUTO-ENTMCNC: 31 G/DL (ref 31–36)
MCHC RBC AUTO-ENTMCNC: 31.5 G/DL (ref 31–36)
MCHC RBC AUTO-ENTMCNC: 31.6 G/DL (ref 31–36)
MCHC RBC AUTO-ENTMCNC: 31.7 G/DL (ref 31–36)
MCHC RBC AUTO-ENTMCNC: 31.7 G/DL (ref 31–36)
MCHC RBC AUTO-ENTMCNC: 31.9 G/DL (ref 31–36)
MCHC RBC AUTO-ENTMCNC: 32 G/DL (ref 31–36)
MCHC RBC AUTO-ENTMCNC: 32.1 G/DL (ref 31–36)
MCHC RBC AUTO-ENTMCNC: 32.2 G/DL (ref 31–36)
MCHC RBC AUTO-ENTMCNC: 32.4 G/DL (ref 31–36)
MCHC RBC AUTO-ENTMCNC: 32.6 G/DL (ref 31–36)
MCHC RBC AUTO-ENTMCNC: 32.7 G/DL (ref 31–36)
MCHC RBC AUTO-ENTMCNC: 32.8 G/DL (ref 31–36)
MCHC RBC AUTO-ENTMCNC: 32.9 G/DL (ref 31–36)
MCHC RBC AUTO-ENTMCNC: 33 G/DL
MCHC RBC AUTO-ENTMCNC: 33 G/DL (ref 31–36)
MCHC RBC AUTO-ENTMCNC: 33.1 G/DL (ref 31–36)
MCHC RBC AUTO-ENTMCNC: 33.2 G/DL (ref 31–36)
MCHC RBC AUTO-ENTMCNC: 33.3 G/DL (ref 31–36)
MCHC RBC AUTO-ENTMCNC: 33.3 G/DL (ref 31–36)
MCHC RBC AUTO-ENTMCNC: 33.4 G/DL (ref 31–36)
MCHC RBC AUTO-ENTMCNC: 33.6 G/DL (ref 31–36)
MCHC RBC AUTO-ENTMCNC: 33.6 G/DL (ref 31–36)
MCHC RBC AUTO-ENTMCNC: 33.7 G/DL (ref 31–36)
MCHC RBC AUTO-ENTMCNC: 34.6 G/DL (ref 31–36)
MCV RBC AUTO: 78.9 FL (ref 80–100)
MCV RBC AUTO: 80.1 FL (ref 80–100)
MCV RBC AUTO: 80.2 FL (ref 80–100)
MCV RBC AUTO: 80.4 FL (ref 80–100)
MCV RBC AUTO: 80.5 FL (ref 80–100)
MCV RBC AUTO: 80.5 FL (ref 80–100)
MCV RBC AUTO: 80.8 FL (ref 80–100)
MCV RBC AUTO: 80.8 FL (ref 80–100)
MCV RBC AUTO: 81.4 FL (ref 80–100)
MCV RBC AUTO: 81.4 FL (ref 80–100)
MCV RBC AUTO: 81.7 FL (ref 80–100)
MCV RBC AUTO: 81.8 FL (ref 80–100)
MCV RBC AUTO: 81.9 FL (ref 80–100)
MCV RBC AUTO: 82 FL (ref 80–100)
MCV RBC AUTO: 82.6 FL (ref 80–100)
MCV RBC AUTO: 83.4 FL (ref 80–100)
MCV RBC AUTO: 84.2 FL (ref 80–100)
MCV RBC AUTO: 84.7 FL (ref 80–100)
MCV RBC AUTO: 84.8 FL
MCV RBC AUTO: 84.8 FL (ref 80–100)
MCV RBC AUTO: 85 FL (ref 80–100)
MCV RBC AUTO: 85 FL (ref 80–100)
MCV RBC AUTO: 85.1 FL (ref 80–100)
MCV RBC AUTO: 85.1 FL (ref 80–100)
MCV RBC AUTO: 85.4 FL (ref 80–100)
MCV RBC AUTO: 85.5 FL (ref 80–100)
MCV RBC AUTO: 85.5 FL (ref 80–100)
MCV RBC AUTO: 85.6 FL (ref 80–100)
MCV RBC AUTO: 85.9 FL (ref 80–100)
MCV RBC AUTO: 86 FL (ref 80–100)
MCV RBC AUTO: 86 FL (ref 80–100)
MCV RBC AUTO: 86.1 FL (ref 80–100)
MCV RBC AUTO: 86.2 FL (ref 80–100)
MCV RBC AUTO: 86.3 FL (ref 80–100)
MCV RBC AUTO: 86.3 FL (ref 80–100)
MCV RBC AUTO: 86.4 FL (ref 80–100)
MCV RBC AUTO: 86.5 FL (ref 80–100)
MCV RBC AUTO: 86.5 FL (ref 80–100)
MCV RBC AUTO: 86.6 FL (ref 80–100)
MCV RBC AUTO: 86.9 FL (ref 80–100)
MCV RBC AUTO: 87 FL (ref 80–100)
MCV RBC AUTO: 87.1 FL (ref 80–100)
MCV RBC AUTO: 87.2 FL (ref 80–100)
MCV RBC AUTO: 87.2 FL (ref 80–100)
MCV RBC AUTO: 87.3 FL (ref 80–100)
METHADONE SCREEN, URINE: POSITIVE
METHEMOGLOBIN ARTERIAL: 0 % (ref 0–1.4)
METHEMOGLOBIN ARTERIAL: 0.2 % (ref 0–1.4)
METHEMOGLOBIN ARTERIAL: 0.2 % (ref 0–1.4)
METHEMOGLOBIN ARTERIAL: 0.4 % (ref 0–1.4)
METHEMOGLOBIN ARTERIAL: 0.6 % (ref 0–1.4)
METHEMOGLOBIN ARTERIAL: 1 % (ref 0–1.4)
METHEMOGLOBIN ARTERIAL: <0 % (ref 0–1.4)
METHEMOGLOBIN VENOUS: 0.3 % (ref 0–1.5)
METHEMOGLOBIN VENOUS: <0 % (ref 0–1.5)
METHEMOGLOBIN VENOUS: <1 % (ref 0–1.5)
MICROALBUMIN UR-MCNC: 134.4 MG/DL
MICROALBUMIN/CREAT UR-RTO: 3286.1 MG/G (ref 0–30)
MICROSCOPIC EXAMINATION: YES
MONOCYTES ABSOLUTE: 0.3 K/UL (ref 0–1.3)
MONOCYTES ABSOLUTE: 0.4 K/UL (ref 0–1.3)
MONOCYTES ABSOLUTE: 0.5 /ΜL
MONOCYTES ABSOLUTE: 0.5 K/UL (ref 0–1.3)
MONOCYTES ABSOLUTE: 0.6 K/UL (ref 0–1.3)
MONOCYTES ABSOLUTE: 0.7 K/UL (ref 0–1.3)
MONOCYTES ABSOLUTE: 0.8 K/UL (ref 0–1.3)
MONOCYTES ABSOLUTE: 0.9 K/UL (ref 0–1.3)
MONOCYTES RELATIVE PERCENT: 10.4 %
MONOCYTES RELATIVE PERCENT: 11.2 %
MONOCYTES RELATIVE PERCENT: 12 %
MONOCYTES RELATIVE PERCENT: 13.1 %
MONOCYTES RELATIVE PERCENT: 15 %
MONOCYTES RELATIVE PERCENT: 4.4 %
MONOCYTES RELATIVE PERCENT: 4.7 %
MONOCYTES RELATIVE PERCENT: 4.8 %
MONOCYTES RELATIVE PERCENT: 5 %
MONOCYTES RELATIVE PERCENT: 5.7 %
MONOCYTES RELATIVE PERCENT: 5.8 %
MONOCYTES RELATIVE PERCENT: 6 %
MONOCYTES RELATIVE PERCENT: 6.2 %
MONOCYTES RELATIVE PERCENT: 6.3 %
MONOCYTES RELATIVE PERCENT: 6.3 %
MONOCYTES RELATIVE PERCENT: 6.4 %
MONOCYTES RELATIVE PERCENT: 6.5 %
MONOCYTES RELATIVE PERCENT: 6.6 %
MONOCYTES RELATIVE PERCENT: 6.8 %
MONOCYTES RELATIVE PERCENT: 6.9 %
MONOCYTES RELATIVE PERCENT: 7 %
MONOCYTES RELATIVE PERCENT: 7.1 %
MONOCYTES RELATIVE PERCENT: 7.4 %
MONOCYTES RELATIVE PERCENT: 7.5 %
MONOCYTES RELATIVE PERCENT: 7.6 %
MONOCYTES RELATIVE PERCENT: 7.8 %
MONOCYTES RELATIVE PERCENT: 7.8 %
MONOCYTES RELATIVE PERCENT: 7.9 %
MONOCYTES RELATIVE PERCENT: 8.1 %
MONOCYTES RELATIVE PERCENT: 8.4 %
MONOCYTES RELATIVE PERCENT: 8.6 %
MONOCYTES RELATIVE PERCENT: 8.6 %
MONOCYTES RELATIVE PERCENT: 8.8 %
MONOCYTES RELATIVE PERCENT: 9 %
MONOCYTES RELATIVE PERCENT: 9.1 %
MONOCYTES RELATIVE PERCENT: 9.2 %
MONOCYTES RELATIVE PERCENT: 9.3 %
MONOCYTES RELATIVE PERCENT: 9.5 %
MONOCYTES RELATIVE PERCENT: 9.6 %
NEUTROPHILS ABSOLUTE: 11.4 K/UL (ref 1.7–7.7)
NEUTROPHILS ABSOLUTE: 14.8 K/UL (ref 1.7–7.7)
NEUTROPHILS ABSOLUTE: 2.3 K/UL (ref 1.7–7.7)
NEUTROPHILS ABSOLUTE: 2.6 K/UL (ref 1.7–7.7)
NEUTROPHILS ABSOLUTE: 2.7 K/UL (ref 1.7–7.7)
NEUTROPHILS ABSOLUTE: 2.9 K/UL (ref 1.7–7.7)
NEUTROPHILS ABSOLUTE: 2.9 K/UL (ref 1.7–7.7)
NEUTROPHILS ABSOLUTE: 3.3 K/UL (ref 1.7–7.7)
NEUTROPHILS ABSOLUTE: 3.5 K/UL (ref 1.7–7.7)
NEUTROPHILS ABSOLUTE: 3.6 K/UL (ref 1.7–7.7)
NEUTROPHILS ABSOLUTE: 3.7 K/UL (ref 1.7–7.7)
NEUTROPHILS ABSOLUTE: 3.8 /ΜL
NEUTROPHILS ABSOLUTE: 3.9 K/UL (ref 1.7–7.7)
NEUTROPHILS ABSOLUTE: 4 K/UL (ref 1.7–7.7)
NEUTROPHILS ABSOLUTE: 4 K/UL (ref 1.7–7.7)
NEUTROPHILS ABSOLUTE: 4.1 K/UL (ref 1.7–7.7)
NEUTROPHILS ABSOLUTE: 4.1 K/UL (ref 1.7–7.7)
NEUTROPHILS ABSOLUTE: 4.2 K/UL (ref 1.7–7.7)
NEUTROPHILS ABSOLUTE: 4.3 K/UL (ref 1.7–7.7)
NEUTROPHILS ABSOLUTE: 4.4 K/UL (ref 1.7–7.7)
NEUTROPHILS ABSOLUTE: 4.4 K/UL (ref 1.7–7.7)
NEUTROPHILS ABSOLUTE: 4.5 K/UL (ref 1.7–7.7)
NEUTROPHILS ABSOLUTE: 4.7 K/UL (ref 1.7–7.7)
NEUTROPHILS ABSOLUTE: 4.7 K/UL (ref 1.7–7.7)
NEUTROPHILS ABSOLUTE: 4.8 K/UL (ref 1.7–7.7)
NEUTROPHILS ABSOLUTE: 4.9 K/UL (ref 1.7–7.7)
NEUTROPHILS ABSOLUTE: 5 K/UL (ref 1.7–7.7)
NEUTROPHILS ABSOLUTE: 5 K/UL (ref 1.7–7.7)
NEUTROPHILS ABSOLUTE: 5.1 K/UL (ref 1.7–7.7)
NEUTROPHILS ABSOLUTE: 5.1 K/UL (ref 1.7–7.7)
NEUTROPHILS ABSOLUTE: 5.2 K/UL (ref 1.7–7.7)
NEUTROPHILS ABSOLUTE: 5.4 K/UL (ref 1.7–7.7)
NEUTROPHILS ABSOLUTE: 5.4 K/UL (ref 1.7–7.7)
NEUTROPHILS ABSOLUTE: 5.5 K/UL (ref 1.7–7.7)
NEUTROPHILS ABSOLUTE: 5.5 K/UL (ref 1.7–7.7)
NEUTROPHILS ABSOLUTE: 5.6 K/UL (ref 1.7–7.7)
NEUTROPHILS ABSOLUTE: 5.6 K/UL (ref 1.7–7.7)
NEUTROPHILS ABSOLUTE: 5.7 K/UL (ref 1.7–7.7)
NEUTROPHILS ABSOLUTE: 5.8 K/UL (ref 1.7–7.7)
NEUTROPHILS ABSOLUTE: 5.9 K/UL (ref 1.7–7.7)
NEUTROPHILS ABSOLUTE: 6.2 K/UL (ref 1.7–7.7)
NEUTROPHILS ABSOLUTE: 6.4 K/UL (ref 1.7–7.7)
NEUTROPHILS ABSOLUTE: 6.7 K/UL (ref 1.7–7.7)
NEUTROPHILS ABSOLUTE: 6.9 K/UL (ref 1.7–7.7)
NEUTROPHILS ABSOLUTE: 7.4 K/UL (ref 1.7–7.7)
NEUTROPHILS ABSOLUTE: 7.4 K/UL (ref 1.7–7.7)
NEUTROPHILS ABSOLUTE: 7.5 K/UL (ref 1.7–7.7)
NEUTROPHILS ABSOLUTE: 9.2 K/UL (ref 1.7–7.7)
NEUTROPHILS RELATIVE PERCENT: 51.8 %
NEUTROPHILS RELATIVE PERCENT: 52.6 %
NEUTROPHILS RELATIVE PERCENT: 52.6 %
NEUTROPHILS RELATIVE PERCENT: 55.2 %
NEUTROPHILS RELATIVE PERCENT: 56.5 %
NEUTROPHILS RELATIVE PERCENT: 57.7 %
NEUTROPHILS RELATIVE PERCENT: 58.4 %
NEUTROPHILS RELATIVE PERCENT: 58.4 %
NEUTROPHILS RELATIVE PERCENT: 58.6 %
NEUTROPHILS RELATIVE PERCENT: 60 %
NEUTROPHILS RELATIVE PERCENT: 60.5 %
NEUTROPHILS RELATIVE PERCENT: 60.5 %
NEUTROPHILS RELATIVE PERCENT: 61.7 %
NEUTROPHILS RELATIVE PERCENT: 62.2 %
NEUTROPHILS RELATIVE PERCENT: 62.6 %
NEUTROPHILS RELATIVE PERCENT: 62.6 %
NEUTROPHILS RELATIVE PERCENT: 63.2 %
NEUTROPHILS RELATIVE PERCENT: 63.7 %
NEUTROPHILS RELATIVE PERCENT: 63.8 %
NEUTROPHILS RELATIVE PERCENT: 64.1 %
NEUTROPHILS RELATIVE PERCENT: 64.6 %
NEUTROPHILS RELATIVE PERCENT: 64.8 %
NEUTROPHILS RELATIVE PERCENT: 64.8 %
NEUTROPHILS RELATIVE PERCENT: 65.2 %
NEUTROPHILS RELATIVE PERCENT: 65.3 %
NEUTROPHILS RELATIVE PERCENT: 65.3 %
NEUTROPHILS RELATIVE PERCENT: 65.9 %
NEUTROPHILS RELATIVE PERCENT: 66.1 %
NEUTROPHILS RELATIVE PERCENT: 66.6 %
NEUTROPHILS RELATIVE PERCENT: 66.7 %
NEUTROPHILS RELATIVE PERCENT: 66.8 %
NEUTROPHILS RELATIVE PERCENT: 66.9 %
NEUTROPHILS RELATIVE PERCENT: 68.6 %
NEUTROPHILS RELATIVE PERCENT: 68.7 %
NEUTROPHILS RELATIVE PERCENT: 68.9 %
NEUTROPHILS RELATIVE PERCENT: 69 %
NEUTROPHILS RELATIVE PERCENT: 69.4 %
NEUTROPHILS RELATIVE PERCENT: 69.7 %
NEUTROPHILS RELATIVE PERCENT: 70 %
NEUTROPHILS RELATIVE PERCENT: 71 %
NEUTROPHILS RELATIVE PERCENT: 71.5 %
NEUTROPHILS RELATIVE PERCENT: 71.6 %
NEUTROPHILS RELATIVE PERCENT: 71.7 %
NEUTROPHILS RELATIVE PERCENT: 73 %
NEUTROPHILS RELATIVE PERCENT: 73.4 %
NEUTROPHILS RELATIVE PERCENT: 73.6 %
NEUTROPHILS RELATIVE PERCENT: 74.9 %
NEUTROPHILS RELATIVE PERCENT: 76.3 %
NEUTROPHILS RELATIVE PERCENT: 78.1 %
NEUTROPHILS RELATIVE PERCENT: 80 %
NEUTROPHILS RELATIVE PERCENT: 87.9 %
NEUTROPHILS RELATIVE PERCENT: 90.1 %
NITRITE, URINE: NEGATIVE
O2 SAT, ARTERIAL: 100 % (ref 93–100)
O2 SAT, ARTERIAL: 63 % (ref 93–100)
O2 SAT, ARTERIAL: 80 % (ref 93–100)
O2 SAT, ARTERIAL: 85 % (ref 93–100)
O2 SAT, ARTERIAL: 87 % (ref 93–100)
O2 SAT, ARTERIAL: 91 % (ref 93–100)
O2 SAT, ARTERIAL: 95 % (ref 93–100)
O2 SAT, VEN: 77 %
O2 SAT, VEN: 83 %
O2 SAT, VEN: >100 %
OCCULT BLOOD DIAGNOSTIC: NORMAL
OPIATE SCREEN URINE: ABNORMAL
ORGANISM: ABNORMAL
OSMOLALITY: 320 MOSM/KG (ref 278–305)
OXYCODONE URINE: ABNORMAL
PCO2 ARTERIAL: 56.7 MMHG (ref 35–45)
PCO2 ARTERIAL: 57.4 MMHG (ref 35–45)
PCO2 ARTERIAL: 59 MMHG (ref 35–45)
PCO2 ARTERIAL: 60.9 MMHG (ref 35–45)
PCO2 ARTERIAL: 64 MMHG (ref 35–45)
PCO2 ARTERIAL: 67.6 MMHG (ref 35–45)
PCO2 ARTERIAL: 68.5 MMHG (ref 35–45)
PCO2, VEN: 51.1 MMHG (ref 41–51)
PCO2, VEN: 51.5 MMHG (ref 41–51)
PCO2, VEN: 57.3 MMHG (ref 41–51)
PDW BLD-RTO: 15.5 % (ref 12.4–15.4)
PDW BLD-RTO: 15.5 % (ref 12.4–15.4)
PDW BLD-RTO: 15.8 % (ref 12.4–15.4)
PDW BLD-RTO: 15.8 % (ref 12.4–15.4)
PDW BLD-RTO: 15.9 % (ref 12.4–15.4)
PDW BLD-RTO: 16 % (ref 12.4–15.4)
PDW BLD-RTO: 16.1 % (ref 12.4–15.4)
PDW BLD-RTO: 16.1 % (ref 12.4–15.4)
PDW BLD-RTO: 16.2 % (ref 12.4–15.4)
PDW BLD-RTO: 16.2 % (ref 12.4–15.4)
PDW BLD-RTO: 16.3 % (ref 12.4–15.4)
PDW BLD-RTO: 16.4 % (ref 12.4–15.4)
PDW BLD-RTO: 16.4 % (ref 12.4–15.4)
PDW BLD-RTO: 16.5 % (ref 12.4–15.4)
PDW BLD-RTO: 16.5 % (ref 12.4–15.4)
PDW BLD-RTO: 16.6 % (ref 12.4–15.4)
PDW BLD-RTO: 16.8 % (ref 12.4–15.4)
PDW BLD-RTO: 16.8 % (ref 12.4–15.4)
PDW BLD-RTO: 16.9 % (ref 12.4–15.4)
PDW BLD-RTO: 17 % (ref 12.4–15.4)
PDW BLD-RTO: 17.1 % (ref 12.4–15.4)
PDW BLD-RTO: 17.2 % (ref 12.4–15.4)
PDW BLD-RTO: 17.2 % (ref 12.4–15.4)
PDW BLD-RTO: 17.3 % (ref 12.4–15.4)
PDW BLD-RTO: 17.4 % (ref 12.4–15.4)
PDW BLD-RTO: 17.5 % (ref 12.4–15.4)
PDW BLD-RTO: 17.5 % (ref 12.4–15.4)
PDW BLD-RTO: 17.6 % (ref 12.4–15.4)
PDW BLD-RTO: 17.7 % (ref 12.4–15.4)
PDW BLD-RTO: 17.8 % (ref 12.4–15.4)
PDW BLD-RTO: 18 % (ref 12.4–15.4)
PERFORMED ON: ABNORMAL
PERFORMED ON: NORMAL
PH ARTERIAL: 7.24 (ref 7.35–7.45)
PH ARTERIAL: 7.26 (ref 7.35–7.45)
PH ARTERIAL: 7.3 (ref 7.35–7.45)
PH ARTERIAL: 7.31 (ref 7.35–7.45)
PH ARTERIAL: 7.36 (ref 7.35–7.45)
PH ARTERIAL: 7.36 (ref 7.35–7.45)
PH ARTERIAL: 7.37 (ref 7.35–7.45)
PH UA: 6
PH UA: 6 (ref 5–8)
PH UA: 6 (ref 5–8)
PH UA: 7 (ref 5–8)
PH VENOUS: 7.27 (ref 7.35–7.45)
PH VENOUS: 7.4 (ref 7.35–7.45)
PH VENOUS: 7.4 (ref 7.35–7.45)
PHENCYCLIDINE SCREEN URINE: ABNORMAL
PHOSPHORUS: 2.9 MG/DL (ref 2.5–4.9)
PHOSPHORUS: 3.1 MG/DL (ref 2.5–4.9)
PHOSPHORUS: 3.2 MG/DL (ref 2.5–4.9)
PHOSPHORUS: 3.2 MG/DL (ref 2.5–4.9)
PHOSPHORUS: 3.4 MG/DL (ref 2.5–4.9)
PHOSPHORUS: 3.4 MG/DL (ref 2.5–4.9)
PHOSPHORUS: 3.5 MG/DL (ref 2.5–4.9)
PHOSPHORUS: 3.6 MG/DL (ref 2.5–4.9)
PHOSPHORUS: 3.7 MG/DL (ref 2.5–4.9)
PHOSPHORUS: 3.8 MG/DL (ref 2.5–4.9)
PHOSPHORUS: 3.9 MG/DL (ref 2.5–4.9)
PHOSPHORUS: 3.9 MG/DL (ref 2.5–4.9)
PHOSPHORUS: 4 MG/DL (ref 2.5–4.9)
PHOSPHORUS: 4.2 MG/DL (ref 2.5–4.9)
PHOSPHORUS: 4.4 MG/DL (ref 2.5–4.9)
PHOSPHORUS: 4.4 MG/DL (ref 2.5–4.9)
PHOSPHORUS: 4.5 MG/DL (ref 2.5–4.9)
PHOSPHORUS: 4.6 MG/DL (ref 2.5–4.9)
PHOSPHORUS: 4.6 MG/DL (ref 2.5–4.9)
PHOSPHORUS: 4.9 MG/DL (ref 2.5–4.9)
PHOSPHORUS: 5 MG/DL (ref 2.5–4.9)
PLATELET # BLD: 260 K/UL (ref 135–450)
PLATELET # BLD: 263 K/UL (ref 135–450)
PLATELET # BLD: 273 K/UL (ref 135–450)
PLATELET # BLD: 279 K/UL (ref 135–450)
PLATELET # BLD: 280 K/UL (ref 135–450)
PLATELET # BLD: 289 K/UL (ref 135–450)
PLATELET # BLD: 289 K/UL (ref 135–450)
PLATELET # BLD: 290 K/UL (ref 135–450)
PLATELET # BLD: 293 K/UL (ref 135–450)
PLATELET # BLD: 296 K/UL (ref 135–450)
PLATELET # BLD: 296 K/UL (ref 135–450)
PLATELET # BLD: 300 K/UL (ref 135–450)
PLATELET # BLD: 301 K/UL (ref 135–450)
PLATELET # BLD: 303 K/UL (ref 135–450)
PLATELET # BLD: 319 K/UL (ref 135–450)
PLATELET # BLD: 324 K/UL (ref 135–450)
PLATELET # BLD: 324 K/UL (ref 135–450)
PLATELET # BLD: 327 K/UL (ref 135–450)
PLATELET # BLD: 327 K/UL (ref 135–450)
PLATELET # BLD: 335 K/UL (ref 135–450)
PLATELET # BLD: 343 K/UL (ref 135–450)
PLATELET # BLD: 348 K/UL (ref 135–450)
PLATELET # BLD: 353 K/UL (ref 135–450)
PLATELET # BLD: 356 K/UL (ref 135–450)
PLATELET # BLD: 359 K/UL (ref 135–450)
PLATELET # BLD: 365 K/UL (ref 135–450)
PLATELET # BLD: 369 K/UL (ref 135–450)
PLATELET # BLD: 370 K/UL (ref 135–450)
PLATELET # BLD: 371 K/UL (ref 135–450)
PLATELET # BLD: 373 K/UL (ref 135–450)
PLATELET # BLD: 377 K/UL (ref 135–450)
PLATELET # BLD: 380 K/UL (ref 135–450)
PLATELET # BLD: 380 K/UL (ref 135–450)
PLATELET # BLD: 381 K/UL (ref 135–450)
PLATELET # BLD: 382 K/UL (ref 135–450)
PLATELET # BLD: 382 K/UL (ref 135–450)
PLATELET # BLD: 386 K/UL (ref 135–450)
PLATELET # BLD: 386 K/UL (ref 135–450)
PLATELET # BLD: 387 K/UL (ref 135–450)
PLATELET # BLD: 390 K/ΜL
PLATELET # BLD: 391 K/UL (ref 135–450)
PLATELET # BLD: 395 K/UL (ref 135–450)
PLATELET # BLD: 395 K/UL (ref 135–450)
PLATELET # BLD: 399 K/UL (ref 135–450)
PLATELET # BLD: 400 K/UL (ref 135–450)
PLATELET # BLD: 414 K/UL (ref 135–450)
PLATELET # BLD: 419 K/UL (ref 135–450)
PLATELET # BLD: 426 K/UL (ref 135–450)
PLATELET # BLD: 590 K/UL (ref 135–450)
PMV BLD AUTO: 7.6 FL (ref 5–10.5)
PMV BLD AUTO: 7.6 FL (ref 5–10.5)
PMV BLD AUTO: 7.7 FL (ref 5–10.5)
PMV BLD AUTO: 7.7 FL (ref 5–10.5)
PMV BLD AUTO: 7.8 FL (ref 5–10.5)
PMV BLD AUTO: 7.9 FL (ref 5–10.5)
PMV BLD AUTO: 8 FL (ref 5–10.5)
PMV BLD AUTO: 8.1 FL (ref 5–10.5)
PMV BLD AUTO: 8.2 FL (ref 5–10.5)
PMV BLD AUTO: 8.3 FL (ref 5–10.5)
PMV BLD AUTO: 8.4 FL (ref 5–10.5)
PMV BLD AUTO: 8.4 FL (ref 5–10.5)
PMV BLD AUTO: 8.5 FL
PMV BLD AUTO: 8.6 FL (ref 5–10.5)
PMV BLD AUTO: 8.7 FL (ref 5–10.5)
PMV BLD AUTO: 8.7 FL (ref 5–10.5)
PMV BLD AUTO: 8.8 FL (ref 5–10.5)
PMV BLD AUTO: 8.8 FL (ref 5–10.5)
PMV BLD AUTO: 9.4 FL (ref 5–10.5)
PO2 ARTERIAL: 125 MMHG (ref 75–108)
PO2 ARTERIAL: 36.3 MMHG (ref 75–108)
PO2 ARTERIAL: 46.5 MMHG (ref 75–108)
PO2 ARTERIAL: 53.5 MMHG (ref 75–108)
PO2 ARTERIAL: 53.9 MMHG (ref 75–108)
PO2 ARTERIAL: 64.3 MMHG (ref 75–108)
PO2 ARTERIAL: 80.1 MMHG (ref 75–108)
PO2, VEN: 161 MMHG (ref 25–40)
PO2, VEN: 47.2 MMHG (ref 25–40)
PO2, VEN: 48.6 MMHG (ref 25–40)
POTASSIUM REFLEX MAGNESIUM: 4.5 MMOL/L (ref 3.5–5.1)
POTASSIUM REFLEX MAGNESIUM: 4.5 MMOL/L (ref 3.5–5.1)
POTASSIUM REFLEX MAGNESIUM: 4.6 MMOL/L (ref 3.5–5.1)
POTASSIUM REFLEX MAGNESIUM: 4.7 MMOL/L (ref 3.5–5.1)
POTASSIUM REFLEX MAGNESIUM: 4.8 MMOL/L (ref 3.5–5.1)
POTASSIUM REFLEX MAGNESIUM: 4.9 MMOL/L (ref 3.5–5.1)
POTASSIUM REFLEX MAGNESIUM: 5.1 MMOL/L (ref 3.5–5.1)
POTASSIUM REFLEX MAGNESIUM: 5.2 MMOL/L (ref 3.5–5.1)
POTASSIUM SERPL-SCNC: 4.1 MMOL/L (ref 3.5–5.1)
POTASSIUM SERPL-SCNC: 4.3 MMOL/L (ref 3.5–5.1)
POTASSIUM SERPL-SCNC: 4.3 MMOL/L (ref 3.5–5.1)
POTASSIUM SERPL-SCNC: 4.4 MMOL/L (ref 3.5–5.1)
POTASSIUM SERPL-SCNC: 4.5 MMOL/L (ref 3.5–5.1)
POTASSIUM SERPL-SCNC: 4.6 MMOL/L (ref 3.5–5.1)
POTASSIUM SERPL-SCNC: 4.7 MMOL/L (ref 3.5–5.1)
POTASSIUM SERPL-SCNC: 4.8 MMOL/L (ref 3.5–5.1)
POTASSIUM SERPL-SCNC: 4.9 MMOL/L (ref 3.5–5.1)
POTASSIUM SERPL-SCNC: 5 MMOL/L (ref 3.5–5.1)
POTASSIUM SERPL-SCNC: 5.1 MMOL/L (ref 3.5–5.1)
POTASSIUM SERPL-SCNC: 5.2 MMOL/L (ref 3.5–5.1)
POTASSIUM SERPL-SCNC: 5.2 MMOL/L (ref 3.5–5.1)
POTASSIUM SERPL-SCNC: 5.3 MMOL/L (ref 3.5–5.1)
POTASSIUM SERPL-SCNC: 5.5 MMOL/L (ref 3.5–5.1)
POTASSIUM SERPL-SCNC: 5.6 MMOL/L (ref 3.5–5.1)
POTASSIUM SERPL-SCNC: 5.7 MMOL/L (ref 3.5–5.1)
POTASSIUM SERPL-SCNC: 5.7 MMOL/L (ref 3.5–5.1)
POTASSIUM SERPL-SCNC: 5.8 MMOL/L (ref 3.5–5.1)
POTASSIUM SERPL-SCNC: 6.1 MMOL/L (ref 3.5–5.1)
POTASSIUM SERPL-SCNC: 6.1 MMOL/L (ref 3.5–5.1)
POTASSIUM SERPL-SCNC: 6.2 MMOL/L (ref 3.5–5.1)
PREALBUMIN: 7.2 MG/DL (ref 20–40)
PRO-BNP: 1056 PG/ML (ref 0–124)
PRO-BNP: 1697 PG/ML (ref 0–124)
PRO-BNP: 3059 PG/ML (ref 0–124)
PRO-BNP: 4427 PG/ML (ref 0–124)
PRO-BNP: 4733 PG/ML (ref 0–124)
PRO-BNP: 5913 PG/ML (ref 0–124)
PRO-BNP: 7499 PG/ML (ref 0–124)
PRO-BNP: ABNORMAL PG/ML (ref 0–124)
PROPOXYPHENE SCREEN: ABNORMAL
PROTEIN PROTEIN: 0.18 G/DL
PROTEIN PROTEIN: 136 MG/DL
PROTEIN PROTEIN: 177 MG/DL
PROTEIN PROTEIN: 68 MG/DL
PROTEIN UA: 100 MG/DL
PROTEIN/CREAT RATIO: 1.4 MG/DL
PROTEIN/CREAT RATIO: 1.9 MG/DL
PROTHROMBIN TIME: 10.5 SEC (ref 9.9–12.7)
PROTHROMBIN TIME: 11.1 SEC (ref 9.9–12.7)
PROTHROMBIN TIME: 14.8 SEC (ref 9.9–12.7)
PROTHROMBIN TIME: 15.3 SEC (ref 9.9–12.7)
PROTHROMBIN TIME: 15.5 SEC (ref 9.9–12.7)
RAPID INFLUENZA  B AGN: NEGATIVE
RAPID INFLUENZA A AGN: NEGATIVE
RBC # BLD: 2.44 M/UL (ref 4–5.2)
RBC # BLD: 2.47 M/UL (ref 4–5.2)
RBC # BLD: 2.54 M/UL (ref 4–5.2)
RBC # BLD: 2.58 M/UL (ref 4–5.2)
RBC # BLD: 2.63 M/UL (ref 4–5.2)
RBC # BLD: 2.63 M/UL (ref 4–5.2)
RBC # BLD: 2.67 M/UL (ref 4–5.2)
RBC # BLD: 2.68 M/UL (ref 4–5.2)
RBC # BLD: 2.71 M/UL (ref 4–5.2)
RBC # BLD: 2.74 M/UL (ref 4–5.2)
RBC # BLD: 2.76 M/UL (ref 4–5.2)
RBC # BLD: 2.78 M/UL (ref 4–5.2)
RBC # BLD: 2.79 M/UL (ref 4–5.2)
RBC # BLD: 2.81 M/UL (ref 4–5.2)
RBC # BLD: 2.82 M/UL (ref 4–5.2)
RBC # BLD: 2.83 M/UL (ref 4–5.2)
RBC # BLD: 2.85 M/UL (ref 4–5.2)
RBC # BLD: 2.88 M/UL (ref 4–5.2)
RBC # BLD: 2.88 M/UL (ref 4–5.2)
RBC # BLD: 2.9 M/UL (ref 4–5.2)
RBC # BLD: 2.9 M/UL (ref 4–5.2)
RBC # BLD: 2.93 M/UL (ref 4–5.2)
RBC # BLD: 2.99 M/UL (ref 4–5.2)
RBC # BLD: 3 M/UL (ref 4–5.2)
RBC # BLD: 3.01 M/UL (ref 4–5.2)
RBC # BLD: 3.02 M/UL (ref 4–5.2)
RBC # BLD: 3.04 M/UL (ref 4–5.2)
RBC # BLD: 3.07 M/UL (ref 4–5.2)
RBC # BLD: 3.14 M/UL (ref 4–5.2)
RBC # BLD: 3.15 M/UL (ref 4–5.2)
RBC # BLD: 3.17 M/UL (ref 4–5.2)
RBC # BLD: 3.19 M/UL (ref 4–5.2)
RBC # BLD: 3.19 M/UL (ref 4–5.2)
RBC # BLD: 3.24 M/UL (ref 4–5.2)
RBC # BLD: 3.24 M/UL (ref 4–5.2)
RBC # BLD: 3.25 M/UL (ref 4–5.2)
RBC # BLD: 3.25 M/UL (ref 4–5.2)
RBC # BLD: 3.29 10^6/ΜL
RBC # BLD: 3.29 M/UL (ref 4–5.2)
RBC # BLD: 3.29 M/UL (ref 4–5.2)
RBC # BLD: 3.3 M/UL (ref 4–5.2)
RBC # BLD: 3.31 M/UL (ref 4–5.2)
RBC # BLD: 3.4 M/UL (ref 4–5.2)
RBC # BLD: 3.43 M/UL (ref 4–5.2)
RBC # BLD: 3.52 M/UL (ref 4–5.2)
RBC # BLD: 3.56 M/UL (ref 4–5.2)
RBC # BLD: 3.59 M/UL (ref 4–5.2)
RBC UA: ABNORMAL /HPF (ref 0–4)
REASON FOR REJECTION: NORMAL
REJECTED TEST: NORMAL
RENAL EPITHELIAL, UA: ABNORMAL /HPF (ref 0–1)
RENAL EPITHELIAL, UA: ABNORMAL /HPF (ref 0–1)
RETICULOCYTE ABSOLUTE COUNT: 0.06 M/UL (ref 0.02–0.1)
RETICULOCYTE COUNT PCT: 2.09 % (ref 0.5–2.18)
SARS-COV-2, NAAT: NOT DETECTED
SARS-COV-2, PCR: NOT DETECTED
SARS-COV-2, PCR: NOT DETECTED
SEDIMENTATION RATE, ERYTHROCYTE: 116 MM/HR (ref 0–30)
SEDIMENTATION RATE, ERYTHROCYTE: 83 MM/HR (ref 0–30)
SEDIMENTATION RATE, ERYTHROCYTE: >120 MM/HR (ref 0–30)
SODIUM BLD-SCNC: 133 MMOL/L (ref 136–145)
SODIUM BLD-SCNC: 133 MMOL/L (ref 136–145)
SODIUM BLD-SCNC: 134 MMOL/L (ref 136–145)
SODIUM BLD-SCNC: 134 MMOL/L (ref 136–145)
SODIUM BLD-SCNC: 135 MMOL/L (ref 136–145)
SODIUM BLD-SCNC: 136 MMOL/L (ref 136–145)
SODIUM BLD-SCNC: 137 MMOL/L (ref 136–145)
SODIUM BLD-SCNC: 138 MMOL/L (ref 136–145)
SODIUM BLD-SCNC: 139 MMOL/L (ref 136–145)
SODIUM BLD-SCNC: 140 MMOL/L (ref 136–145)
SODIUM BLD-SCNC: 141 MMOL/L (ref 136–145)
SODIUM BLD-SCNC: 142 MMOL/L (ref 136–145)
SODIUM BLD-SCNC: 142 MMOL/L (ref 136–145)
SODIUM URINE: 102 MMOL/L
SODIUM URINE: 56 MMOL/L
SOLUBLE TRANSFERRIN RECEPT: 7.8 MG/L (ref 1.9–4.4)
SPE/IFE INTERPRETATION: NORMAL
SPECIFIC GRAVITY UA: 1.02 (ref 1–1.03)
T4 FREE: 1.2 NG/DL (ref 0.9–1.8)
TCO2 ARTERIAL: 30 MMOL/L
TCO2 ARTERIAL: 31 MMOL/L
TCO2 ARTERIAL: 32 MMOL/L
TCO2 ARTERIAL: 34 MMOL/L
TCO2 ARTERIAL: 35 MMOL/L
TCO2 ARTERIAL: 36 MMOL/L
TCO2 ARTERIAL: 37 MMOL/L
TCO2 CALC VENOUS: 28 MMOL/L
TCO2 CALC VENOUS: 34 MMOL/L
TCO2 CALC VENOUS: 34 MMOL/L
TOTAL CK: 71 U/L (ref 26–192)
TOTAL IRON BINDING CAPACITY: 174 UG/DL (ref 260–445)
TOTAL IRON BINDING CAPACITY: 224 UG/DL (ref 260–445)
TOTAL IRON BINDING CAPACITY: 247 UG/DL (ref 260–445)
TOTAL IRON BINDING CAPACITY: 250 UG/DL (ref 260–445)
TOTAL PROTEIN: 5.7 G/DL (ref 6.4–8.2)
TOTAL PROTEIN: 5.8 G/DL (ref 6.4–8.2)
TOTAL PROTEIN: 5.9 G/DL (ref 6.4–8.2)
TOTAL PROTEIN: 6.1 G/DL (ref 6.4–8.2)
TOTAL PROTEIN: 6.3 G/DL (ref 6.4–8.2)
TOTAL PROTEIN: 6.3 G/DL (ref 6.4–8.2)
TOTAL PROTEIN: 6.5 G/DL (ref 6.4–8.2)
TOTAL PROTEIN: 6.7 G/DL (ref 6.4–8.2)
TOTAL PROTEIN: 6.9 G/DL (ref 6.4–8.2)
TOTAL PROTEIN: 7.3 G/DL (ref 6.4–8.2)
TROPONIN: 0.02 NG/ML
TROPONIN: 0.03 NG/ML
TROPONIN: 0.04 NG/ML
TROPONIN: 0.05 NG/ML
TROPONIN: 0.05 NG/ML
TROPONIN: 0.06 NG/ML
TROPONIN: 0.06 NG/ML
TSH REFLEX: 2.84 UIU/ML (ref 0.27–4.2)
TSH REFLEX: 4.29 UIU/ML (ref 0.27–4.2)
UREA NITROGEN, UR: 424.8 MG/DL (ref 800–1666)
URINE CULTURE, ROUTINE: NORMAL
URINE ELECTROPHORESIS INTERP: NORMAL
URINE REFLEX TO CULTURE: ABNORMAL
URINE TYPE: ABNORMAL
UROBILINOGEN, URINE: 0.2 E.U./DL
VANCOMYCIN RANDOM: 10.1 UG/ML
VANCOMYCIN RANDOM: 12.3 UG/ML
VANCOMYCIN RANDOM: 13.2 UG/ML
VANCOMYCIN RANDOM: 13.9 UG/ML
VANCOMYCIN RANDOM: 15 UG/ML
VANCOMYCIN RANDOM: 15.5 UG/ML
VANCOMYCIN RANDOM: 15.6 UG/ML
VANCOMYCIN RANDOM: 15.8 UG/ML
VANCOMYCIN RANDOM: 17.8 UG/ML
VANCOMYCIN RANDOM: 18.1 UG/ML
VANCOMYCIN RANDOM: 18.5 UG/ML
VANCOMYCIN RANDOM: 18.5 UG/ML
VANCOMYCIN RANDOM: 18.6 UG/ML
VANCOMYCIN RANDOM: 18.7 UG/ML
VANCOMYCIN RANDOM: 18.8 UG/ML
VANCOMYCIN RANDOM: 20.4 UG/ML
VANCOMYCIN RANDOM: 23 UG/ML
VANCOMYCIN RANDOM: 23.6 UG/ML
VANCOMYCIN RANDOM: 24.5 UG/ML
VANCOMYCIN RANDOM: 25.8 UG/ML
VANCOMYCIN TROUGH: 11.4
VANCOMYCIN TROUGH: 13 UG/ML (ref 10–20)
VANCOMYCIN TROUGH: 16.5 UG/ML (ref 10–20)
VANCOMYCIN TROUGH: 16.8 UG/ML (ref 10–20)
VITAMIN B-12: 259 PG/ML (ref 211–911)
VITAMIN D 25-HYDROXY: <5 NG/ML
WBC # BLD: 10.1 K/UL (ref 4–11)
WBC # BLD: 10.8 K/UL (ref 4–11)
WBC # BLD: 12.1 K/UL (ref 4–11)
WBC # BLD: 12.3 K/UL (ref 4–11)
WBC # BLD: 12.9 K/UL (ref 4–11)
WBC # BLD: 16.4 K/UL (ref 4–11)
WBC # BLD: 4.4 K/UL (ref 4–11)
WBC # BLD: 4.8 K/UL (ref 4–11)
WBC # BLD: 4.9 K/UL (ref 4–11)
WBC # BLD: 5.1 K/UL (ref 4–11)
WBC # BLD: 5.3 K/UL (ref 4–11)
WBC # BLD: 5.5 K/UL (ref 4–11)
WBC # BLD: 5.7 K/UL (ref 4–11)
WBC # BLD: 5.9 K/UL (ref 4–11)
WBC # BLD: 6 K/UL (ref 4–11)
WBC # BLD: 6 K/UL (ref 4–11)
WBC # BLD: 6.2 K/UL (ref 4–11)
WBC # BLD: 6.3 K/UL (ref 4–11)
WBC # BLD: 6.4 K/UL (ref 4–11)
WBC # BLD: 6.5 K/UL (ref 4–11)
WBC # BLD: 6.7 10^3/ML
WBC # BLD: 6.7 K/UL (ref 4–11)
WBC # BLD: 6.7 K/UL (ref 4–11)
WBC # BLD: 6.8 K/UL (ref 4–11)
WBC # BLD: 6.9 K/UL (ref 4–11)
WBC # BLD: 6.9 K/UL (ref 4–11)
WBC # BLD: 7 K/UL (ref 4–11)
WBC # BLD: 7.1 K/UL (ref 4–11)
WBC # BLD: 7.2 K/UL (ref 4–11)
WBC # BLD: 7.3 K/UL (ref 4–11)
WBC # BLD: 7.4 K/UL (ref 4–11)
WBC # BLD: 7.5 K/UL (ref 4–11)
WBC # BLD: 7.7 K/UL (ref 4–11)
WBC # BLD: 7.8 K/UL (ref 4–11)
WBC # BLD: 7.8 K/UL (ref 4–11)
WBC # BLD: 7.9 K/UL (ref 4–11)
WBC # BLD: 8 K/UL (ref 4–11)
WBC # BLD: 8 K/UL (ref 4–11)
WBC # BLD: 8.1 K/UL (ref 4–11)
WBC # BLD: 8.1 K/UL (ref 4–11)
WBC # BLD: 8.2 K/UL (ref 4–11)
WBC # BLD: 8.3 K/UL (ref 4–11)
WBC # BLD: 8.3 K/UL (ref 4–11)
WBC # BLD: 9.1 K/UL (ref 4–11)
WBC # BLD: 9.2 K/UL (ref 4–11)
WBC # BLD: 9.2 K/UL (ref 4–11)
WBC # BLD: 9.6 K/UL (ref 4–11)
WBC UA: ABNORMAL /HPF (ref 0–5)
WOUND/ABSCESS: ABNORMAL
YEAST: PRESENT /HPF

## 2021-01-01 PROCEDURE — 6370000000 HC RX 637 (ALT 250 FOR IP): Performed by: INTERNAL MEDICINE

## 2021-01-01 PROCEDURE — 2580000003 HC RX 258

## 2021-01-01 PROCEDURE — 36415 COLL VENOUS BLD VENIPUNCTURE: CPT

## 2021-01-01 PROCEDURE — 97535 SELF CARE MNGMENT TRAINING: CPT

## 2021-01-01 PROCEDURE — 85520 HEPARIN ASSAY: CPT

## 2021-01-01 PROCEDURE — 2580000003 HC RX 258: Performed by: PODIATRIST

## 2021-01-01 PROCEDURE — 94761 N-INVAS EAR/PLS OXIMETRY MLT: CPT

## 2021-01-01 PROCEDURE — 2580000003 HC RX 258: Performed by: INTERNAL MEDICINE

## 2021-01-01 PROCEDURE — 6360000002 HC RX W HCPCS: Performed by: STUDENT IN AN ORGANIZED HEALTH CARE EDUCATION/TRAINING PROGRAM

## 2021-01-01 PROCEDURE — 2580000003 HC RX 258: Performed by: EMERGENCY MEDICINE

## 2021-01-01 PROCEDURE — 80048 BASIC METABOLIC PNL TOTAL CA: CPT

## 2021-01-01 PROCEDURE — 99233 SBSQ HOSP IP/OBS HIGH 50: CPT | Performed by: INTERNAL MEDICINE

## 2021-01-01 PROCEDURE — 6360000002 HC RX W HCPCS: Performed by: INTERNAL MEDICINE

## 2021-01-01 PROCEDURE — 74230 X-RAY XM SWLNG FUNCJ C+: CPT

## 2021-01-01 PROCEDURE — 87015 SPECIMEN INFECT AGNT CONCNTJ: CPT

## 2021-01-01 PROCEDURE — 83735 ASSAY OF MAGNESIUM: CPT

## 2021-01-01 PROCEDURE — 6360000002 HC RX W HCPCS: Performed by: PODIATRIST

## 2021-01-01 PROCEDURE — 80053 COMPREHEN METABOLIC PANEL: CPT

## 2021-01-01 PROCEDURE — 85610 PROTHROMBIN TIME: CPT

## 2021-01-01 PROCEDURE — 93970 EXTREMITY STUDY: CPT

## 2021-01-01 PROCEDURE — 76705 ECHO EXAM OF ABDOMEN: CPT

## 2021-01-01 PROCEDURE — 2700000000 HC OXYGEN THERAPY PER DAY

## 2021-01-01 PROCEDURE — 84484 ASSAY OF TROPONIN QUANT: CPT

## 2021-01-01 PROCEDURE — 99233 SBSQ HOSP IP/OBS HIGH 50: CPT | Performed by: HOSPITALIST

## 2021-01-01 PROCEDURE — 0JBR0ZZ EXCISION OF LEFT FOOT SUBCUTANEOUS TISSUE AND FASCIA, OPEN APPROACH: ICD-10-PCS | Performed by: PODIATRIST

## 2021-01-01 PROCEDURE — 1200000000 HC SEMI PRIVATE

## 2021-01-01 PROCEDURE — 51702 INSERT TEMP BLADDER CATH: CPT

## 2021-01-01 PROCEDURE — G0328 FECAL BLOOD SCRN IMMUNOASSAY: HCPCS

## 2021-01-01 PROCEDURE — 6360000004 HC RX CONTRAST MEDICATION: Performed by: STUDENT IN AN ORGANIZED HEALTH CARE EDUCATION/TRAINING PROGRAM

## 2021-01-01 PROCEDURE — 85025 COMPLETE CBC W/AUTO DIFF WBC: CPT

## 2021-01-01 PROCEDURE — 36600 WITHDRAWAL OF ARTERIAL BLOOD: CPT

## 2021-01-01 PROCEDURE — 80202 ASSAY OF VANCOMYCIN: CPT

## 2021-01-01 PROCEDURE — 97162 PT EVAL MOD COMPLEX 30 MIN: CPT

## 2021-01-01 PROCEDURE — 86901 BLOOD TYPING SEROLOGIC RH(D): CPT

## 2021-01-01 PROCEDURE — 83605 ASSAY OF LACTIC ACID: CPT

## 2021-01-01 PROCEDURE — 51798 US URINE CAPACITY MEASURE: CPT

## 2021-01-01 PROCEDURE — 2500000003 HC RX 250 WO HCPCS: Performed by: STUDENT IN AN ORGANIZED HEALTH CARE EDUCATION/TRAINING PROGRAM

## 2021-01-01 PROCEDURE — 0KBV0ZZ EXCISION OF RIGHT FOOT MUSCLE, OPEN APPROACH: ICD-10-PCS | Performed by: PODIATRIST

## 2021-01-01 PROCEDURE — 87077 CULTURE AEROBIC IDENTIFY: CPT

## 2021-01-01 PROCEDURE — 97530 THERAPEUTIC ACTIVITIES: CPT

## 2021-01-01 PROCEDURE — 6370000000 HC RX 637 (ALT 250 FOR IP): Performed by: STUDENT IN AN ORGANIZED HEALTH CARE EDUCATION/TRAINING PROGRAM

## 2021-01-01 PROCEDURE — 6360000002 HC RX W HCPCS: Performed by: NURSE ANESTHETIST, CERTIFIED REGISTERED

## 2021-01-01 PROCEDURE — 2580000003 HC RX 258: Performed by: STUDENT IN AN ORGANIZED HEALTH CARE EDUCATION/TRAINING PROGRAM

## 2021-01-01 PROCEDURE — 6370000000 HC RX 637 (ALT 250 FOR IP): Performed by: PODIATRIST

## 2021-01-01 PROCEDURE — 3700000000 HC ANESTHESIA ATTENDED CARE: Performed by: PODIATRIST

## 2021-01-01 PROCEDURE — 6370000000 HC RX 637 (ALT 250 FOR IP): Performed by: HOSPITALIST

## 2021-01-01 PROCEDURE — 83880 ASSAY OF NATRIURETIC PEPTIDE: CPT

## 2021-01-01 PROCEDURE — 92610 EVALUATE SWALLOWING FUNCTION: CPT

## 2021-01-01 PROCEDURE — C8929 TTE W OR WO FOL WCON,DOPPLER: HCPCS

## 2021-01-01 PROCEDURE — 11000 DBRDMT ECZ/INFECTED SKIN<10%: CPT

## 2021-01-01 PROCEDURE — 3600000002 HC SURGERY LEVEL 2 BASE: Performed by: PODIATRIST

## 2021-01-01 PROCEDURE — 82803 BLOOD GASES ANY COMBINATION: CPT

## 2021-01-01 PROCEDURE — 93005 ELECTROCARDIOGRAM TRACING: CPT | Performed by: STUDENT IN AN ORGANIZED HEALTH CARE EDUCATION/TRAINING PROGRAM

## 2021-01-01 PROCEDURE — 90686 IIV4 VACC NO PRSV 0.5 ML IM: CPT | Performed by: PODIATRIST

## 2021-01-01 PROCEDURE — 2780000010 HC IMPLANT OTHER: Performed by: PODIATRIST

## 2021-01-01 PROCEDURE — 11042 DBRDMT SUBQ TIS 1ST 20SQCM/<: CPT

## 2021-01-01 PROCEDURE — 83883 ASSAY NEPHELOMETRY NOT SPEC: CPT

## 2021-01-01 PROCEDURE — 84166 PROTEIN E-PHORESIS/URINE/CSF: CPT

## 2021-01-01 PROCEDURE — 6370000000 HC RX 637 (ALT 250 FOR IP)

## 2021-01-01 PROCEDURE — 82550 ASSAY OF CK (CPK): CPT

## 2021-01-01 PROCEDURE — 97166 OT EVAL MOD COMPLEX 45 MIN: CPT

## 2021-01-01 PROCEDURE — 93010 ELECTROCARDIOGRAM REPORT: CPT | Performed by: INTERNAL MEDICINE

## 2021-01-01 PROCEDURE — 87116 MYCOBACTERIA CULTURE: CPT

## 2021-01-01 PROCEDURE — 84132 ASSAY OF SERUM POTASSIUM: CPT

## 2021-01-01 PROCEDURE — 85014 HEMATOCRIT: CPT

## 2021-01-01 PROCEDURE — 86140 C-REACTIVE PROTEIN: CPT

## 2021-01-01 PROCEDURE — 36430 TRANSFUSION BLD/BLD COMPNT: CPT

## 2021-01-01 PROCEDURE — 82728 ASSAY OF FERRITIN: CPT

## 2021-01-01 PROCEDURE — 99232 SBSQ HOSP IP/OBS MODERATE 35: CPT | Performed by: INTERNAL MEDICINE

## 2021-01-01 PROCEDURE — U0003 INFECTIOUS AGENT DETECTION BY NUCLEIC ACID (DNA OR RNA); SEVERE ACUTE RESPIRATORY SYNDROME CORONAVIRUS 2 (SARS-COV-2) (CORONAVIRUS DISEASE [COVID-19]), AMPLIFIED PROBE TECHNIQUE, MAKING USE OF HIGH THROUGHPUT TECHNOLOGIES AS DESCRIBED BY CMS-2020-01-R: HCPCS

## 2021-01-01 PROCEDURE — 84155 ASSAY OF PROTEIN SERUM: CPT

## 2021-01-01 PROCEDURE — 99213 OFFICE O/P EST LOW 20 MIN: CPT | Performed by: PODIATRIST

## 2021-01-01 PROCEDURE — 87070 CULTURE OTHR SPECIMN AEROBIC: CPT

## 2021-01-01 PROCEDURE — 0Y6V0Z0 DETACHMENT AT RIGHT 4TH TOE, COMPLETE, OPEN APPROACH: ICD-10-PCS | Performed by: PODIATRIST

## 2021-01-01 PROCEDURE — 74176 CT ABD & PELVIS W/O CONTRAST: CPT

## 2021-01-01 PROCEDURE — 93005 ELECTROCARDIOGRAM TRACING: CPT | Performed by: EMERGENCY MEDICINE

## 2021-01-01 PROCEDURE — 11042 DBRDMT SUBQ TIS 1ST 20SQCM/<: CPT | Performed by: PODIATRIST

## 2021-01-01 PROCEDURE — 87040 BLOOD CULTURE FOR BACTERIA: CPT

## 2021-01-01 PROCEDURE — 82140 ASSAY OF AMMONIA: CPT

## 2021-01-01 PROCEDURE — 86900 BLOOD TYPING SEROLOGIC ABO: CPT

## 2021-01-01 PROCEDURE — 6360000002 HC RX W HCPCS

## 2021-01-01 PROCEDURE — 83550 IRON BINDING TEST: CPT

## 2021-01-01 PROCEDURE — 99213 OFFICE O/P EST LOW 20 MIN: CPT

## 2021-01-01 PROCEDURE — 5A1935Z RESPIRATORY VENTILATION, LESS THAN 24 CONSECUTIVE HOURS: ICD-10-PCS | Performed by: EMERGENCY MEDICINE

## 2021-01-01 PROCEDURE — 88311 DECALCIFY TISSUE: CPT

## 2021-01-01 PROCEDURE — 6360000002 HC RX W HCPCS: Performed by: HOSPITALIST

## 2021-01-01 PROCEDURE — 86850 RBC ANTIBODY SCREEN: CPT

## 2021-01-01 PROCEDURE — 2500000003 HC RX 250 WO HCPCS

## 2021-01-01 PROCEDURE — 94660 CPAP INITIATION&MGMT: CPT

## 2021-01-01 PROCEDURE — 2000000000 HC ICU R&B

## 2021-01-01 PROCEDURE — 2580000003 HC RX 258: Performed by: PHYSICIAN ASSISTANT

## 2021-01-01 PROCEDURE — 2580000003 HC RX 258: Performed by: HOSPITALIST

## 2021-01-01 PROCEDURE — 96375 TX/PRO/DX INJ NEW DRUG ADDON: CPT

## 2021-01-01 PROCEDURE — 6370000000 HC RX 637 (ALT 250 FOR IP): Performed by: NURSE ANESTHETIST, CERTIFIED REGISTERED

## 2021-01-01 PROCEDURE — 71250 CT THORAX DX C-: CPT

## 2021-01-01 PROCEDURE — 99213 OFFICE O/P EST LOW 20 MIN: CPT | Performed by: STUDENT IN AN ORGANIZED HEALTH CARE EDUCATION/TRAINING PROGRAM

## 2021-01-01 PROCEDURE — 87186 SC STD MICRODIL/AGAR DIL: CPT

## 2021-01-01 PROCEDURE — 99223 1ST HOSP IP/OBS HIGH 75: CPT | Performed by: INTERNAL MEDICINE

## 2021-01-01 PROCEDURE — 83615 LACTATE (LD) (LDH) ENZYME: CPT

## 2021-01-01 PROCEDURE — 71045 X-RAY EXAM CHEST 1 VIEW: CPT

## 2021-01-01 PROCEDURE — 3700000000 HC ANESTHESIA ATTENDED CARE: Performed by: INTERNAL MEDICINE

## 2021-01-01 PROCEDURE — 36591 DRAW BLOOD OFF VENOUS DEVICE: CPT

## 2021-01-01 PROCEDURE — C1894 INTRO/SHEATH, NON-LASER: HCPCS

## 2021-01-01 PROCEDURE — 7100000011 HC PHASE II RECOVERY - ADDTL 15 MIN: Performed by: PODIATRIST

## 2021-01-01 PROCEDURE — 96374 THER/PROPH/DIAG INJ IV PUSH: CPT

## 2021-01-01 PROCEDURE — 87102 FUNGUS ISOLATION CULTURE: CPT

## 2021-01-01 PROCEDURE — 82077 ASSAY SPEC XCP UR&BREATH IA: CPT

## 2021-01-01 PROCEDURE — 82570 ASSAY OF URINE CREATININE: CPT

## 2021-01-01 PROCEDURE — 2060000000 HC ICU INTERMEDIATE R&B

## 2021-01-01 PROCEDURE — 87206 SMEAR FLUORESCENT/ACID STAI: CPT

## 2021-01-01 PROCEDURE — 6360000002 HC RX W HCPCS: Performed by: PHYSICIAN ASSISTANT

## 2021-01-01 PROCEDURE — 80069 RENAL FUNCTION PANEL: CPT

## 2021-01-01 PROCEDURE — C1751 CATH, INF, PER/CENT/MIDLINE: HCPCS

## 2021-01-01 PROCEDURE — 83036 HEMOGLOBIN GLYCOSYLATED A1C: CPT

## 2021-01-01 PROCEDURE — 2709999900 HC NON-CHARGEABLE SUPPLY: Performed by: INTERNAL MEDICINE

## 2021-01-01 PROCEDURE — 83930 ASSAY OF BLOOD OSMOLALITY: CPT

## 2021-01-01 PROCEDURE — 77001 FLUOROGUIDE FOR VEIN DEVICE: CPT | Performed by: RADIOLOGY

## 2021-01-01 PROCEDURE — 80076 HEPATIC FUNCTION PANEL: CPT

## 2021-01-01 PROCEDURE — 87205 SMEAR GRAM STAIN: CPT

## 2021-01-01 PROCEDURE — 86922 COMPATIBILITY TEST ANTIGLOB: CPT

## 2021-01-01 PROCEDURE — 3700000001 HC ADD 15 MINUTES (ANESTHESIA): Performed by: INTERNAL MEDICINE

## 2021-01-01 PROCEDURE — 2709999900 HC NON-CHARGEABLE SUPPLY: Performed by: PODIATRIST

## 2021-01-01 PROCEDURE — 81001 URINALYSIS AUTO W/SCOPE: CPT

## 2021-01-01 PROCEDURE — 86870 RBC ANTIBODY IDENTIFICATION: CPT

## 2021-01-01 PROCEDURE — 99285 EMERGENCY DEPT VISIT HI MDM: CPT

## 2021-01-01 PROCEDURE — 2500000003 HC RX 250 WO HCPCS: Performed by: PODIATRIST

## 2021-01-01 PROCEDURE — 84300 ASSAY OF URINE SODIUM: CPT

## 2021-01-01 PROCEDURE — 99231 SBSQ HOSP IP/OBS SF/LOW 25: CPT | Performed by: SURGERY

## 2021-01-01 PROCEDURE — 85730 THROMBOPLASTIN TIME PARTIAL: CPT

## 2021-01-01 PROCEDURE — 11720 DEBRIDE NAIL 1-5: CPT

## 2021-01-01 PROCEDURE — 2500000003 HC RX 250 WO HCPCS: Performed by: ANESTHESIOLOGY

## 2021-01-01 PROCEDURE — 85652 RBC SED RATE AUTOMATED: CPT

## 2021-01-01 PROCEDURE — 80307 DRUG TEST PRSMV CHEM ANLYZR: CPT

## 2021-01-01 PROCEDURE — 02HV33Z INSERTION OF INFUSION DEVICE INTO SUPERIOR VENA CAVA, PERCUTANEOUS APPROACH: ICD-10-PCS | Performed by: RADIOLOGY

## 2021-01-01 PROCEDURE — 6360000002 HC RX W HCPCS: Performed by: ANESTHESIOLOGY

## 2021-01-01 PROCEDURE — 84540 ASSAY OF URINE/UREA-N: CPT

## 2021-01-01 PROCEDURE — 84443 ASSAY THYROID STIM HORMONE: CPT

## 2021-01-01 PROCEDURE — 87176 TISSUE HOMOGENIZATION CULTR: CPT

## 2021-01-01 PROCEDURE — 99213 OFFICE O/P EST LOW 20 MIN: CPT | Performed by: PHYSICIAN ASSISTANT

## 2021-01-01 PROCEDURE — 87086 URINE CULTURE/COLONY COUNT: CPT

## 2021-01-01 PROCEDURE — 84439 ASSAY OF FREE THYROXINE: CPT

## 2021-01-01 PROCEDURE — 84156 ASSAY OF PROTEIN URINE: CPT

## 2021-01-01 PROCEDURE — 6360000004 HC RX CONTRAST MEDICATION

## 2021-01-01 PROCEDURE — 85027 COMPLETE CBC AUTOMATED: CPT

## 2021-01-01 PROCEDURE — P9016 RBC LEUKOCYTES REDUCED: HCPCS

## 2021-01-01 PROCEDURE — 99283 EMERGENCY DEPT VISIT LOW MDM: CPT

## 2021-01-01 PROCEDURE — 85018 HEMOGLOBIN: CPT

## 2021-01-01 PROCEDURE — 99221 1ST HOSP IP/OBS SF/LOW 40: CPT | Performed by: NURSE PRACTITIONER

## 2021-01-01 PROCEDURE — 84238 ASSAY NONENDOCRINE RECEPTOR: CPT

## 2021-01-01 PROCEDURE — 2709999900 HC NON-CHARGEABLE SUPPLY

## 2021-01-01 PROCEDURE — 0JH63XZ INSERTION OF TUNNELED VASCULAR ACCESS DEVICE INTO CHEST SUBCUTANEOUS TISSUE AND FASCIA, PERCUTANEOUS APPROACH: ICD-10-PCS | Performed by: RADIOLOGY

## 2021-01-01 PROCEDURE — 86880 COOMBS TEST DIRECT: CPT

## 2021-01-01 PROCEDURE — 83690 ASSAY OF LIPASE: CPT

## 2021-01-01 PROCEDURE — 3600000012 HC SURGERY LEVEL 2 ADDTL 15MIN: Performed by: PODIATRIST

## 2021-01-01 PROCEDURE — 0DB98ZX EXCISION OF DUODENUM, VIA NATURAL OR ARTIFICIAL OPENING ENDOSCOPIC, DIAGNOSTIC: ICD-10-PCS | Performed by: INTERNAL MEDICINE

## 2021-01-01 PROCEDURE — 97110 THERAPEUTIC EXERCISES: CPT

## 2021-01-01 PROCEDURE — 7100000011 HC PHASE II RECOVERY - ADDTL 15 MIN: Performed by: INTERNAL MEDICINE

## 2021-01-01 PROCEDURE — 2500000003 HC RX 250 WO HCPCS: Performed by: NURSE ANESTHETIST, CERTIFIED REGISTERED

## 2021-01-01 PROCEDURE — 73630 X-RAY EXAM OF FOOT: CPT

## 2021-01-01 PROCEDURE — 36558 INSERT TUNNELED CV CATH: CPT | Performed by: RADIOLOGY

## 2021-01-01 PROCEDURE — C1769 GUIDE WIRE: HCPCS

## 2021-01-01 PROCEDURE — 99291 CRITICAL CARE FIRST HOUR: CPT | Performed by: INTERNAL MEDICINE

## 2021-01-01 PROCEDURE — 36592 COLLECT BLOOD FROM PICC: CPT

## 2021-01-01 PROCEDURE — 85379 FIBRIN DEGRADATION QUANT: CPT

## 2021-01-01 PROCEDURE — 70450 CT HEAD/BRAIN W/O DYE: CPT

## 2021-01-01 PROCEDURE — 29405 APPL SHORT LEG CAST: CPT

## 2021-01-01 PROCEDURE — 87075 CULTR BACTERIA EXCEPT BLOOD: CPT

## 2021-01-01 PROCEDURE — 2580000003 HC RX 258: Performed by: NURSE ANESTHETIST, CERTIFIED REGISTERED

## 2021-01-01 PROCEDURE — 6360000002 HC RX W HCPCS: Performed by: EMERGENCY MEDICINE

## 2021-01-01 PROCEDURE — 7100000010 HC PHASE II RECOVERY - FIRST 15 MIN: Performed by: INTERNAL MEDICINE

## 2021-01-01 PROCEDURE — 92611 MOTION FLUOROSCOPY/SWALLOW: CPT

## 2021-01-01 PROCEDURE — 99284 EMERGENCY DEPT VISIT MOD MDM: CPT

## 2021-01-01 PROCEDURE — 88342 IMHCHEM/IMCYTCHM 1ST ANTB: CPT

## 2021-01-01 PROCEDURE — 99211 OFF/OP EST MAY X REQ PHY/QHP: CPT | Performed by: NURSE PRACTITIONER

## 2021-01-01 PROCEDURE — 7100000000 HC PACU RECOVERY - FIRST 15 MIN: Performed by: PODIATRIST

## 2021-01-01 PROCEDURE — 87804 INFLUENZA ASSAY W/OPTIC: CPT

## 2021-01-01 PROCEDURE — 82306 VITAMIN D 25 HYDROXY: CPT

## 2021-01-01 PROCEDURE — 2500000003 HC RX 250 WO HCPCS: Performed by: EMERGENCY MEDICINE

## 2021-01-01 PROCEDURE — 92526 ORAL FUNCTION THERAPY: CPT

## 2021-01-01 PROCEDURE — 31500 INSERT EMERGENCY AIRWAY: CPT

## 2021-01-01 PROCEDURE — 88305 TISSUE EXAM BY PATHOLOGIST: CPT

## 2021-01-01 PROCEDURE — 3700000001 HC ADD 15 MINUTES (ANESTHESIA): Performed by: PODIATRIST

## 2021-01-01 PROCEDURE — 93005 ELECTROCARDIOGRAM TRACING: CPT | Performed by: INTERNAL MEDICINE

## 2021-01-01 PROCEDURE — 97116 GAIT TRAINING THERAPY: CPT

## 2021-01-01 PROCEDURE — U0005 INFEC AGEN DETEC AMPLI PROBE: HCPCS

## 2021-01-01 PROCEDURE — 87635 SARS-COV-2 COVID-19 AMP PRB: CPT

## 2021-01-01 PROCEDURE — A9576 INJ PROHANCE MULTIPACK: HCPCS | Performed by: STUDENT IN AN ORGANIZED HEALTH CARE EDUCATION/TRAINING PROGRAM

## 2021-01-01 PROCEDURE — 99211 OFF/OP EST MAY X REQ PHY/QHP: CPT | Performed by: STUDENT IN AN ORGANIZED HEALTH CARE EDUCATION/TRAINING PROGRAM

## 2021-01-01 PROCEDURE — 84165 PROTEIN E-PHORESIS SERUM: CPT

## 2021-01-01 PROCEDURE — 84100 ASSAY OF PHOSPHORUS: CPT

## 2021-01-01 PROCEDURE — 73620 X-RAY EXAM OF FOOT: CPT

## 2021-01-01 PROCEDURE — 83540 ASSAY OF IRON: CPT

## 2021-01-01 PROCEDURE — 3609012400 HC EGD TRANSORAL BIOPSY SINGLE/MULTIPLE: Performed by: INTERNAL MEDICINE

## 2021-01-01 PROCEDURE — G0008 ADMIN INFLUENZA VIRUS VAC: HCPCS | Performed by: PODIATRIST

## 2021-01-01 PROCEDURE — 0BH17EZ INSERTION OF ENDOTRACHEAL AIRWAY INTO TRACHEA, VIA NATURAL OR ARTIFICIAL OPENING: ICD-10-PCS | Performed by: EMERGENCY MEDICINE

## 2021-01-01 PROCEDURE — 0QBN0ZZ EXCISION OF RIGHT METATARSAL, OPEN APPROACH: ICD-10-PCS | Performed by: PODIATRIST

## 2021-01-01 PROCEDURE — 92950 HEART/LUNG RESUSCITATION CPR: CPT

## 2021-01-01 PROCEDURE — 94150 VITAL CAPACITY TEST: CPT

## 2021-01-01 PROCEDURE — 72158 MRI LUMBAR SPINE W/O & W/DYE: CPT

## 2021-01-01 PROCEDURE — 0QBP0ZZ EXCISION OF LEFT METATARSAL, OPEN APPROACH: ICD-10-PCS | Performed by: PODIATRIST

## 2021-01-01 PROCEDURE — 96376 TX/PRO/DX INJ SAME DRUG ADON: CPT

## 2021-01-01 PROCEDURE — 51701 INSERT BLADDER CATHETER: CPT

## 2021-01-01 PROCEDURE — 64445 NJX AA&/STRD SCIATIC NRV IMG: CPT | Performed by: ANESTHESIOLOGY

## 2021-01-01 PROCEDURE — 85045 AUTOMATED RETICULOCYTE COUNT: CPT

## 2021-01-01 PROCEDURE — 73718 MRI LOWER EXTREMITY W/O DYE: CPT

## 2021-01-01 PROCEDURE — 99221 1ST HOSP IP/OBS SF/LOW 40: CPT | Performed by: SURGERY

## 2021-01-01 PROCEDURE — 84703 CHORIONIC GONADOTROPIN ASSAY: CPT

## 2021-01-01 PROCEDURE — 93925 LOWER EXTREMITY STUDY: CPT

## 2021-01-01 PROCEDURE — 0Y6Y0Z0 DETACHMENT AT LEFT 5TH TOE, COMPLETE, OPEN APPROACH: ICD-10-PCS | Performed by: PODIATRIST

## 2021-01-01 PROCEDURE — 7100000001 HC PACU RECOVERY - ADDTL 15 MIN: Performed by: PODIATRIST

## 2021-01-01 PROCEDURE — P9041 ALBUMIN (HUMAN),5%, 50ML: HCPCS | Performed by: NURSE ANESTHETIST, CERTIFIED REGISTERED

## 2021-01-01 PROCEDURE — 96365 THER/PROPH/DIAG IV INF INIT: CPT

## 2021-01-01 PROCEDURE — 7100000010 HC PHASE II RECOVERY - FIRST 15 MIN: Performed by: PODIATRIST

## 2021-01-01 PROCEDURE — 5A12012 PERFORMANCE OF CARDIAC OUTPUT, SINGLE, MANUAL: ICD-10-PCS | Performed by: EMERGENCY MEDICINE

## 2021-01-01 PROCEDURE — 0DB78ZX EXCISION OF STOMACH, PYLORUS, VIA NATURAL OR ARTIFICIAL OPENING ENDOSCOPIC, DIAGNOSTIC: ICD-10-PCS | Performed by: INTERNAL MEDICINE

## 2021-01-01 PROCEDURE — 84134 ASSAY OF PREALBUMIN: CPT

## 2021-01-01 PROCEDURE — 94760 N-INVAS EAR/PLS OXIMETRY 1: CPT

## 2021-01-01 PROCEDURE — 82607 VITAMIN B-12: CPT

## 2021-01-01 DEVICE — ALLOGRAFT HUM TISS 1 CC AMNIO TISS MEMBRN AMNIFLO CRYOPRES: Type: IMPLANTABLE DEVICE | Site: FOOT | Status: FUNCTIONAL

## 2021-01-01 DEVICE — GRAFT HUM TISS W3XL6CM CRYOPRESERVED PLCNTA TISS UMB AMNION: Type: IMPLANTABLE DEVICE | Site: FOOT | Status: FUNCTIONAL

## 2021-01-01 RX ORDER — PROPOFOL 10 MG/ML
INJECTION, EMULSION INTRAVENOUS
Status: COMPLETED
Start: 2021-01-01 | End: 2021-01-01

## 2021-01-01 RX ORDER — AMMONIUM LACTATE 12 G/100G
LOTION TOPICAL NIGHTLY
COMMUNITY

## 2021-01-01 RX ORDER — ONDANSETRON 2 MG/ML
4 INJECTION INTRAMUSCULAR; INTRAVENOUS EVERY 6 HOURS PRN
Status: DISCONTINUED | OUTPATIENT
Start: 2021-01-01 | End: 2021-01-01 | Stop reason: HOSPADM

## 2021-01-01 RX ORDER — HEPARIN SODIUM 1000 [USP'U]/ML
80 INJECTION, SOLUTION INTRAVENOUS; SUBCUTANEOUS PRN
Status: DISCONTINUED | OUTPATIENT
Start: 2021-01-01 | End: 2021-01-01 | Stop reason: SDUPTHER

## 2021-01-01 RX ORDER — ACETAMINOPHEN 650 MG/1
650 SUPPOSITORY RECTAL EVERY 6 HOURS PRN
Status: DISCONTINUED | OUTPATIENT
Start: 2021-01-01 | End: 2021-01-01 | Stop reason: HOSPADM

## 2021-01-01 RX ORDER — ASPIRIN 81 MG/1
81 TABLET ORAL DAILY
Status: CANCELLED | OUTPATIENT
Start: 2021-01-01

## 2021-01-01 RX ORDER — FUROSEMIDE 10 MG/ML
40 INJECTION INTRAMUSCULAR; INTRAVENOUS ONCE
Status: COMPLETED | OUTPATIENT
Start: 2021-01-01 | End: 2021-01-01

## 2021-01-01 RX ORDER — AMITRIPTYLINE HYDROCHLORIDE 50 MG/1
100 TABLET, FILM COATED ORAL NIGHTLY
Status: DISCONTINUED | OUTPATIENT
Start: 2021-01-01 | End: 2021-01-01 | Stop reason: HOSPADM

## 2021-01-01 RX ORDER — VANCOMYCIN HYDROCHLORIDE 750 MG/15ML
750 INJECTION, POWDER, LYOPHILIZED, FOR SOLUTION INTRAVENOUS DAILY
Status: CANCELLED | OUTPATIENT
Start: 2021-01-01

## 2021-01-01 RX ORDER — DEXTROSE MONOHYDRATE 25 G/50ML
12.5 INJECTION, SOLUTION INTRAVENOUS PRN
Status: DISCONTINUED | OUTPATIENT
Start: 2021-01-01 | End: 2021-01-01 | Stop reason: HOSPADM

## 2021-01-01 RX ORDER — M-VIT,TX,IRON,MINS/CALC/FOLIC 27MG-0.4MG
1 TABLET ORAL DAILY
COMMUNITY

## 2021-01-01 RX ORDER — HYDRALAZINE HYDROCHLORIDE 50 MG/1
50 TABLET, FILM COATED ORAL
Status: DISCONTINUED | OUTPATIENT
Start: 2021-01-01 | End: 2021-01-01 | Stop reason: HOSPADM

## 2021-01-01 RX ORDER — SODIUM CHLORIDE 9 MG/ML
25 INJECTION, SOLUTION INTRAVENOUS PRN
Status: DISCONTINUED | OUTPATIENT
Start: 2021-01-01 | End: 2021-01-01 | Stop reason: HOSPADM

## 2021-01-01 RX ORDER — ASPIRIN 81 MG/1
81 TABLET ORAL DAILY
Status: DISCONTINUED | OUTPATIENT
Start: 2021-01-01 | End: 2021-01-01 | Stop reason: HOSPADM

## 2021-01-01 RX ORDER — 0.9 % SODIUM CHLORIDE 0.9 %
1000 INTRAVENOUS SOLUTION INTRAVENOUS ONCE
Status: COMPLETED | OUTPATIENT
Start: 2021-01-01 | End: 2021-01-01

## 2021-01-01 RX ORDER — FUROSEMIDE 40 MG/1
40 TABLET ORAL DAILY
Status: DISCONTINUED | OUTPATIENT
Start: 2021-01-01 | End: 2021-01-01

## 2021-01-01 RX ORDER — FUROSEMIDE 10 MG/ML
40 INJECTION INTRAMUSCULAR; INTRAVENOUS 2 TIMES DAILY
Status: COMPLETED | OUTPATIENT
Start: 2021-01-01 | End: 2021-01-01

## 2021-01-01 RX ORDER — INSULIN LISPRO 100 [IU]/ML
0-6 INJECTION, SOLUTION INTRAVENOUS; SUBCUTANEOUS NIGHTLY
Status: DISCONTINUED | OUTPATIENT
Start: 2021-01-01 | End: 2021-01-01 | Stop reason: HOSPADM

## 2021-01-01 RX ORDER — ONDANSETRON 4 MG/1
4 TABLET, ORALLY DISINTEGRATING ORAL EVERY 8 HOURS PRN
Status: CANCELLED | OUTPATIENT
Start: 2021-01-01

## 2021-01-01 RX ORDER — ATORVASTATIN CALCIUM 20 MG/1
20 TABLET, FILM COATED ORAL NIGHTLY
Status: DISCONTINUED | OUTPATIENT
Start: 2021-01-01 | End: 2021-01-01 | Stop reason: HOSPADM

## 2021-01-01 RX ORDER — NIFEDIPINE 30 MG/1
30 TABLET, FILM COATED, EXTENDED RELEASE ORAL DAILY
Status: DISCONTINUED | OUTPATIENT
Start: 2021-01-01 | End: 2021-01-01 | Stop reason: HOSPADM

## 2021-01-01 RX ORDER — HALOPERIDOL 5 MG/ML
2.5 INJECTION INTRAMUSCULAR
Status: DISCONTINUED | OUTPATIENT
Start: 2021-01-01 | End: 2021-01-01

## 2021-01-01 RX ORDER — DEXTROSE MONOHYDRATE 100 MG/ML
INJECTION, SOLUTION INTRAVENOUS CONTINUOUS
Status: DISPENSED | OUTPATIENT
Start: 2021-01-01 | End: 2021-01-01

## 2021-01-01 RX ORDER — HYDROXYZINE PAMOATE 50 MG/1
1 CAPSULE ORAL 3 TIMES DAILY PRN
COMMUNITY
Start: 2021-01-01

## 2021-01-01 RX ORDER — METHADONE HYDROCHLORIDE 10 MG/ML
70 CONCENTRATE ORAL DAILY
Status: DISCONTINUED | OUTPATIENT
Start: 2021-01-01 | End: 2021-01-01

## 2021-01-01 RX ORDER — LORAZEPAM 2 MG/ML
0.25 INJECTION INTRAMUSCULAR ONCE
Status: COMPLETED | OUTPATIENT
Start: 2021-01-01 | End: 2021-01-01

## 2021-01-01 RX ORDER — SODIUM POLYSTYRENE SULFONATE 15 G/60ML
15 SUSPENSION ORAL; RECTAL ONCE
Qty: 2 BOTTLE | Refills: 0 | Status: SHIPPED | OUTPATIENT
Start: 2021-01-01 | End: 2021-01-01 | Stop reason: ALTCHOICE

## 2021-01-01 RX ORDER — FUROSEMIDE 10 MG/ML
20 INJECTION INTRAMUSCULAR; INTRAVENOUS ONCE
Status: DISCONTINUED | OUTPATIENT
Start: 2021-01-01 | End: 2021-01-01

## 2021-01-01 RX ORDER — PROPOFOL 10 MG/ML
INJECTION, EMULSION INTRAVENOUS PRN
Status: DISCONTINUED | OUTPATIENT
Start: 2021-01-01 | End: 2021-01-01 | Stop reason: SDUPTHER

## 2021-01-01 RX ORDER — 0.9 % SODIUM CHLORIDE 0.9 %
500 INTRAVENOUS SOLUTION INTRAVENOUS ONCE
Status: DISCONTINUED | OUTPATIENT
Start: 2021-01-01 | End: 2021-01-01

## 2021-01-01 RX ORDER — DEXTROSE MONOHYDRATE 50 MG/ML
INJECTION, SOLUTION INTRAVENOUS CONTINUOUS PRN
Status: DISCONTINUED | OUTPATIENT
Start: 2021-01-01 | End: 2021-01-01 | Stop reason: SDUPTHER

## 2021-01-01 RX ORDER — FUROSEMIDE 10 MG/ML
20 INJECTION INTRAMUSCULAR; INTRAVENOUS ONCE
Status: COMPLETED | OUTPATIENT
Start: 2021-01-01 | End: 2021-01-01

## 2021-01-01 RX ORDER — IBUPROFEN 800 MG/1
800 TABLET ORAL 3 TIMES DAILY PRN
Qty: 90 TABLET | Refills: 0 | Status: SHIPPED | OUTPATIENT
Start: 2021-01-01 | End: 2021-01-01 | Stop reason: ALTCHOICE

## 2021-01-01 RX ORDER — CIPROFLOXACIN 500 MG/1
500 TABLET, FILM COATED ORAL 2 TIMES DAILY
Qty: 20 TABLET | Refills: 0 | Status: SHIPPED | OUTPATIENT
Start: 2021-01-01 | End: 2021-01-01

## 2021-01-01 RX ORDER — ONDANSETRON 4 MG/1
4 TABLET, ORALLY DISINTEGRATING ORAL EVERY 8 HOURS PRN
Status: DISCONTINUED | OUTPATIENT
Start: 2021-01-01 | End: 2021-01-01 | Stop reason: HOSPADM

## 2021-01-01 RX ORDER — LORAZEPAM 2 MG/ML
0.5 INJECTION INTRAMUSCULAR EVERY 10 MIN PRN
Status: DISCONTINUED | OUTPATIENT
Start: 2021-01-01 | End: 2021-01-01 | Stop reason: HOSPADM

## 2021-01-01 RX ORDER — MEROPENEM 1 G/1
1000 INJECTION, POWDER, FOR SOLUTION INTRAVENOUS EVERY 12 HOURS
Status: DISCONTINUED | OUTPATIENT
Start: 2021-01-01 | End: 2021-01-01

## 2021-01-01 RX ORDER — GUAIFENESIN/DEXTROMETHORPHAN 100-10MG/5
10 SYRUP ORAL EVERY 4 HOURS PRN
COMMUNITY

## 2021-01-01 RX ORDER — 0.9 % SODIUM CHLORIDE 0.9 %
500 INTRAVENOUS SOLUTION INTRAVENOUS ONCE
Status: COMPLETED | OUTPATIENT
Start: 2021-01-01 | End: 2021-01-01

## 2021-01-01 RX ORDER — GABAPENTIN 400 MG/1
400 CAPSULE ORAL 2 TIMES DAILY
Status: DISCONTINUED | OUTPATIENT
Start: 2021-01-01 | End: 2021-01-01 | Stop reason: HOSPADM

## 2021-01-01 RX ORDER — MAGNESIUM SULFATE IN WATER 40 MG/ML
2000 INJECTION, SOLUTION INTRAVENOUS ONCE
Status: COMPLETED | OUTPATIENT
Start: 2021-01-01 | End: 2021-01-01

## 2021-01-01 RX ORDER — HEPARIN SODIUM 5000 [USP'U]/ML
5000 INJECTION, SOLUTION INTRAVENOUS; SUBCUTANEOUS EVERY 8 HOURS SCHEDULED
Status: CANCELLED | OUTPATIENT
Start: 2021-01-01

## 2021-01-01 RX ORDER — LIDOCAINE HYDROCHLORIDE 20 MG/ML
INJECTION, SOLUTION INFILTRATION; PERINEURAL PRN
Status: DISCONTINUED | OUTPATIENT
Start: 2021-01-01 | End: 2021-01-01 | Stop reason: SDUPTHER

## 2021-01-01 RX ORDER — ONDANSETRON 2 MG/ML
4 INJECTION INTRAMUSCULAR; INTRAVENOUS
Status: COMPLETED | OUTPATIENT
Start: 2021-01-01 | End: 2021-01-01

## 2021-01-01 RX ORDER — VANCOMYCIN HYDROCHLORIDE 5 G/100ML
500 INJECTION, POWDER, LYOPHILIZED, FOR SOLUTION INTRAVENOUS DAILY
COMMUNITY
Start: 2021-01-01 | End: 2021-12-08

## 2021-01-01 RX ORDER — SODIUM CHLORIDE 9 MG/ML
1000 INJECTION, SOLUTION INTRAVENOUS CONTINUOUS
Status: DISCONTINUED | OUTPATIENT
Start: 2021-01-01 | End: 2021-01-01

## 2021-01-01 RX ORDER — FENTANYL CITRATE 50 UG/ML
50 INJECTION, SOLUTION INTRAMUSCULAR; INTRAVENOUS EVERY 5 MIN PRN
Status: DISCONTINUED | OUTPATIENT
Start: 2021-01-01 | End: 2021-01-01 | Stop reason: HOSPADM

## 2021-01-01 RX ORDER — ACETAMINOPHEN 325 MG/1
650 TABLET ORAL EVERY 6 HOURS PRN
Status: DISCONTINUED | OUTPATIENT
Start: 2021-01-01 | End: 2021-01-01 | Stop reason: HOSPADM

## 2021-01-01 RX ORDER — INSULIN LISPRO 100 [IU]/ML
0-6 INJECTION, SOLUTION INTRAVENOUS; SUBCUTANEOUS EVERY 4 HOURS
Status: DISCONTINUED | OUTPATIENT
Start: 2021-01-01 | End: 2021-01-01

## 2021-01-01 RX ORDER — HEPARIN SODIUM 5000 [USP'U]/ML
5000 INJECTION, SOLUTION INTRAVENOUS; SUBCUTANEOUS EVERY 8 HOURS SCHEDULED
Status: DISCONTINUED | OUTPATIENT
Start: 2021-01-01 | End: 2021-01-01

## 2021-01-01 RX ORDER — FERROUS SULFATE 325(65) MG
325 TABLET ORAL EVERY OTHER DAY
Status: DISCONTINUED | OUTPATIENT
Start: 2021-01-01 | End: 2021-01-01 | Stop reason: HOSPADM

## 2021-01-01 RX ORDER — MEROPENEM 1 G/1
1000 INJECTION, POWDER, FOR SOLUTION INTRAVENOUS EVERY 12 HOURS
COMMUNITY
Start: 2021-01-01 | End: 2021-12-08

## 2021-01-01 RX ORDER — METOPROLOL SUCCINATE 100 MG/1
100 TABLET, EXTENDED RELEASE ORAL DAILY
Status: DISCONTINUED | OUTPATIENT
Start: 2021-01-01 | End: 2021-01-01

## 2021-01-01 RX ORDER — OXYCODONE HYDROCHLORIDE 5 MG/1
5 TABLET ORAL EVERY 8 HOURS PRN
Status: ON HOLD | COMMUNITY
End: 2021-01-01 | Stop reason: SDUPTHER

## 2021-01-01 RX ORDER — APIXABAN 5 MG/1
TABLET, FILM COATED ORAL
Qty: 60 TABLET | Refills: 1 | Status: SHIPPED | OUTPATIENT
Start: 2021-01-01 | End: 2021-01-01 | Stop reason: SDUPTHER

## 2021-01-01 RX ORDER — FENTANYL CITRATE 50 UG/ML
50 INJECTION, SOLUTION INTRAMUSCULAR; INTRAVENOUS ONCE
Status: COMPLETED | OUTPATIENT
Start: 2021-01-01 | End: 2021-01-01

## 2021-01-01 RX ORDER — HYDRALAZINE HYDROCHLORIDE 25 MG/1
25 TABLET, FILM COATED ORAL EVERY 8 HOURS SCHEDULED
Status: DISCONTINUED | OUTPATIENT
Start: 2021-01-01 | End: 2021-01-01

## 2021-01-01 RX ORDER — FUROSEMIDE 20 MG/1
20 TABLET ORAL DAILY
Status: DISCONTINUED | OUTPATIENT
Start: 2021-01-01 | End: 2021-01-01

## 2021-01-01 RX ORDER — DEXTROSE MONOHYDRATE 25 G/50ML
INJECTION, SOLUTION INTRAVENOUS
Status: COMPLETED
Start: 2021-01-01 | End: 2021-01-01

## 2021-01-01 RX ORDER — ESCITALOPRAM OXALATE 10 MG/1
10 TABLET ORAL DAILY
Status: DISCONTINUED | OUTPATIENT
Start: 2021-01-01 | End: 2021-01-01 | Stop reason: HOSPADM

## 2021-01-01 RX ORDER — TORSEMIDE 20 MG/1
40 TABLET ORAL 2 TIMES DAILY
Status: DISCONTINUED | OUTPATIENT
Start: 2021-01-01 | End: 2021-01-01

## 2021-01-01 RX ORDER — MIDAZOLAM HYDROCHLORIDE 1 MG/ML
INJECTION INTRAMUSCULAR; INTRAVENOUS
Status: COMPLETED
Start: 2021-01-01 | End: 2021-01-01

## 2021-01-01 RX ORDER — METOPROLOL SUCCINATE 50 MG/1
50 TABLET, EXTENDED RELEASE ORAL DAILY
Status: CANCELLED | OUTPATIENT
Start: 2021-01-01

## 2021-01-01 RX ORDER — POLYETHYLENE GLYCOL 3350 17 G/17G
17 POWDER, FOR SOLUTION ORAL DAILY PRN
Status: DISCONTINUED | OUTPATIENT
Start: 2021-01-01 | End: 2021-01-01 | Stop reason: HOSPADM

## 2021-01-01 RX ORDER — NICOTINE POLACRILEX 4 MG
15 LOZENGE BUCCAL PRN
Status: CANCELLED | OUTPATIENT
Start: 2021-01-01

## 2021-01-01 RX ORDER — SODIUM CHLORIDE 9 MG/ML
INJECTION, SOLUTION INTRAVENOUS PRN
Status: DISCONTINUED | OUTPATIENT
Start: 2021-01-01 | End: 2021-01-01 | Stop reason: HOSPADM

## 2021-01-01 RX ORDER — CALCIUM GLUCONATE 94 MG/ML
1000 INJECTION, SOLUTION INTRAVENOUS ONCE
Status: COMPLETED | OUTPATIENT
Start: 2021-01-01 | End: 2021-01-01

## 2021-01-01 RX ORDER — DEXTROSE MONOHYDRATE 50 MG/ML
100 INJECTION, SOLUTION INTRAVENOUS PRN
Status: DISCONTINUED | OUTPATIENT
Start: 2021-01-01 | End: 2021-01-01 | Stop reason: HOSPADM

## 2021-01-01 RX ORDER — SODIUM CHLORIDE 0.9 % (FLUSH) 0.9 %
5-40 SYRINGE (ML) INJECTION PRN
Status: DISCONTINUED | OUTPATIENT
Start: 2021-01-01 | End: 2021-01-01 | Stop reason: HOSPADM

## 2021-01-01 RX ORDER — PROPOFOL 10 MG/ML
INJECTION, EMULSION INTRAVENOUS CONTINUOUS PRN
Status: DISCONTINUED | OUTPATIENT
Start: 2021-01-01 | End: 2021-01-01 | Stop reason: SDUPTHER

## 2021-01-01 RX ORDER — DEXTROSE MONOHYDRATE 50 MG/ML
100 INJECTION, SOLUTION INTRAVENOUS PRN
Status: CANCELLED | OUTPATIENT
Start: 2021-01-01

## 2021-01-01 RX ORDER — TORSEMIDE 20 MG/1
40 TABLET ORAL DAILY
Status: DISCONTINUED | OUTPATIENT
Start: 2021-01-01 | End: 2021-01-01 | Stop reason: HOSPADM

## 2021-01-01 RX ORDER — TORSEMIDE 20 MG/1
TABLET ORAL
Qty: 30 TABLET | Refills: 0 | Status: ON HOLD | OUTPATIENT
Start: 2021-01-01 | End: 2021-01-01

## 2021-01-01 RX ORDER — HEPARIN SODIUM 1000 [USP'U]/ML
80 INJECTION, SOLUTION INTRAVENOUS; SUBCUTANEOUS PRN
Status: DISCONTINUED | OUTPATIENT
Start: 2021-01-01 | End: 2021-01-01 | Stop reason: ALTCHOICE

## 2021-01-01 RX ORDER — HEPARIN SODIUM 1000 [USP'U]/ML
80 INJECTION, SOLUTION INTRAVENOUS; SUBCUTANEOUS ONCE
Status: COMPLETED | OUTPATIENT
Start: 2021-01-01 | End: 2021-01-01

## 2021-01-01 RX ORDER — METOPROLOL SUCCINATE 100 MG/1
100 TABLET, EXTENDED RELEASE ORAL NIGHTLY
Status: DISCONTINUED | OUTPATIENT
Start: 2021-01-01 | End: 2021-01-01 | Stop reason: HOSPADM

## 2021-01-01 RX ORDER — PROMETHAZINE HYDROCHLORIDE 25 MG/ML
6.25 INJECTION, SOLUTION INTRAMUSCULAR; INTRAVENOUS
Status: DISCONTINUED | OUTPATIENT
Start: 2021-01-01 | End: 2021-01-01 | Stop reason: HOSPADM

## 2021-01-01 RX ORDER — ESCITALOPRAM OXALATE 10 MG/1
10 TABLET ORAL DAILY
Qty: 30 TABLET | Refills: 2 | OUTPATIENT
Start: 2021-01-01

## 2021-01-01 RX ORDER — INSULIN LISPRO 100 [IU]/ML
0-12 INJECTION, SOLUTION INTRAVENOUS; SUBCUTANEOUS
Status: DISCONTINUED | OUTPATIENT
Start: 2021-01-01 | End: 2021-01-01

## 2021-01-01 RX ORDER — ENALAPRILAT 2.5 MG/2ML
1.25 INJECTION INTRAVENOUS
Status: DISCONTINUED | OUTPATIENT
Start: 2021-01-01 | End: 2021-01-01 | Stop reason: HOSPADM

## 2021-01-01 RX ORDER — SENNA PLUS 8.6 MG/1
1 TABLET ORAL NIGHTLY
Status: DISCONTINUED | OUTPATIENT
Start: 2021-01-01 | End: 2021-01-01 | Stop reason: HOSPADM

## 2021-01-01 RX ORDER — SODIUM CHLORIDE 0.9 % (FLUSH) 0.9 %
5-40 SYRINGE (ML) INJECTION EVERY 12 HOURS SCHEDULED
Status: CANCELLED | OUTPATIENT
Start: 2021-01-01

## 2021-01-01 RX ORDER — HEPARIN SODIUM 5000 [USP'U]/ML
5000 INJECTION, SOLUTION INTRAVENOUS; SUBCUTANEOUS EVERY 8 HOURS SCHEDULED
Status: DISCONTINUED | OUTPATIENT
Start: 2021-01-01 | End: 2021-01-01 | Stop reason: ALTCHOICE

## 2021-01-01 RX ORDER — FENTANYL CITRATE 50 UG/ML
25 INJECTION, SOLUTION INTRAMUSCULAR; INTRAVENOUS EVERY 5 MIN PRN
Status: DISCONTINUED | OUTPATIENT
Start: 2021-01-01 | End: 2021-01-01 | Stop reason: HOSPADM

## 2021-01-01 RX ORDER — ROCURONIUM BROMIDE 10 MG/ML
INJECTION, SOLUTION INTRAVENOUS PRN
Status: DISCONTINUED | OUTPATIENT
Start: 2021-01-01 | End: 2021-01-01 | Stop reason: SDUPTHER

## 2021-01-01 RX ORDER — PANTOPRAZOLE SODIUM 40 MG/1
40 TABLET, DELAYED RELEASE ORAL
Qty: 30 TABLET | Refills: 1 | DISCHARGE
Start: 2021-01-01

## 2021-01-01 RX ORDER — DEXTROSE MONOHYDRATE 25 G/50ML
12.5 INJECTION, SOLUTION INTRAVENOUS PRN
Status: CANCELLED | OUTPATIENT
Start: 2021-01-01

## 2021-01-01 RX ORDER — MEROPENEM 1 G/1
1000 INJECTION, POWDER, FOR SOLUTION INTRAVENOUS EVERY 12 HOURS
Status: ON HOLD | COMMUNITY
End: 2021-01-01 | Stop reason: HOSPADM

## 2021-01-01 RX ORDER — GLYCOPYRROLATE 1 MG/5 ML
SYRINGE (ML) INTRAVENOUS PRN
Status: DISCONTINUED | OUTPATIENT
Start: 2021-01-01 | End: 2021-01-01 | Stop reason: SDUPTHER

## 2021-01-01 RX ORDER — OXYCODONE HYDROCHLORIDE 5 MG/1
5 TABLET ORAL EVERY 8 HOURS PRN
Qty: 2 TABLET | Refills: 0 | Status: SHIPPED | OUTPATIENT
Start: 2021-01-01 | End: 2021-01-01

## 2021-01-01 RX ORDER — VANCOMYCIN HYDROCHLORIDE 750 MG/15ML
750 INJECTION, POWDER, LYOPHILIZED, FOR SOLUTION INTRAVENOUS DAILY
Status: DISCONTINUED | OUTPATIENT
Start: 2021-01-01 | End: 2021-01-01 | Stop reason: CLARIF

## 2021-01-01 RX ORDER — HYDRALAZINE HYDROCHLORIDE 50 MG/1
50 TABLET, FILM COATED ORAL EVERY 8 HOURS SCHEDULED
Status: CANCELLED | OUTPATIENT
Start: 2021-01-01

## 2021-01-01 RX ORDER — GABAPENTIN 400 MG/1
400 CAPSULE ORAL 2 TIMES DAILY
Status: DISCONTINUED | OUTPATIENT
Start: 2021-01-01 | End: 2021-01-01

## 2021-01-01 RX ORDER — PROPOFOL 10 MG/ML
5-50 INJECTION, EMULSION INTRAVENOUS
Status: DISCONTINUED | OUTPATIENT
Start: 2021-01-01 | End: 2021-01-01

## 2021-01-01 RX ORDER — INSULIN LISPRO 100 [IU]/ML
0-3 INJECTION, SOLUTION INTRAVENOUS; SUBCUTANEOUS NIGHTLY
Status: DISCONTINUED | OUTPATIENT
Start: 2021-01-01 | End: 2021-01-01

## 2021-01-01 RX ORDER — MIDAZOLAM HYDROCHLORIDE 1 MG/ML
2 INJECTION INTRAMUSCULAR; INTRAVENOUS ONCE
Status: COMPLETED | OUTPATIENT
Start: 2021-01-01 | End: 2021-01-01

## 2021-01-01 RX ORDER — GABAPENTIN 400 MG/1
400 CAPSULE ORAL 2 TIMES DAILY
Status: CANCELLED | OUTPATIENT
Start: 2021-01-01

## 2021-01-01 RX ORDER — VANCOMYCIN HYDROCHLORIDE 500 MG/100ML
500 INJECTION, SOLUTION INTRAVENOUS
COMMUNITY
Start: 2021-01-01 | End: 2021-01-01 | Stop reason: SDUPTHER

## 2021-01-01 RX ORDER — METHADONE HYDROCHLORIDE 10 MG/ML
120 CONCENTRATE ORAL DAILY
Status: DISCONTINUED | OUTPATIENT
Start: 2021-01-01 | End: 2021-01-01

## 2021-01-01 RX ORDER — ROCURONIUM BROMIDE 10 MG/ML
INJECTION, SOLUTION INTRAVENOUS
Status: COMPLETED
Start: 2021-01-01 | End: 2021-01-01

## 2021-01-01 RX ORDER — INSULIN LISPRO 100 [IU]/ML
0-3 INJECTION, SOLUTION INTRAVENOUS; SUBCUTANEOUS NIGHTLY
Status: DISCONTINUED | OUTPATIENT
Start: 2021-01-01 | End: 2021-01-01 | Stop reason: HOSPADM

## 2021-01-01 RX ORDER — METHADONE HYDROCHLORIDE 5 MG/5ML
70 SOLUTION ORAL DAILY
Status: DISCONTINUED | OUTPATIENT
Start: 2021-01-01 | End: 2021-01-01

## 2021-01-01 RX ORDER — METHADONE HYDROCHLORIDE 10 MG/1
140 TABLET ORAL DAILY
Status: DISCONTINUED | OUTPATIENT
Start: 2021-01-01 | End: 2021-01-01

## 2021-01-01 RX ORDER — ASPIRIN 81 MG/1
81 TABLET, CHEWABLE ORAL DAILY
COMMUNITY

## 2021-01-01 RX ORDER — MAGNESIUM HYDROXIDE 1200 MG/15ML
LIQUID ORAL CONTINUOUS PRN
Status: COMPLETED | OUTPATIENT
Start: 2021-01-01 | End: 2021-01-01

## 2021-01-01 RX ORDER — OMEPRAZOLE 20 MG/1
20 CAPSULE, DELAYED RELEASE ORAL DAILY
Status: ON HOLD | COMMUNITY
End: 2021-01-01 | Stop reason: HOSPADM

## 2021-01-01 RX ORDER — METOPROLOL SUCCINATE 50 MG/1
TABLET, EXTENDED RELEASE ORAL
Qty: 30 TABLET | Refills: 1 | Status: SHIPPED | OUTPATIENT
Start: 2021-01-01 | End: 2021-01-01 | Stop reason: DRUGHIGH

## 2021-01-01 RX ORDER — FUROSEMIDE 10 MG/ML
60 INJECTION INTRAMUSCULAR; INTRAVENOUS ONCE
Status: COMPLETED | OUTPATIENT
Start: 2021-01-01 | End: 2021-01-01

## 2021-01-01 RX ORDER — INSULIN GLARGINE 100 [IU]/ML
25 INJECTION, SOLUTION SUBCUTANEOUS NIGHTLY
Qty: 5 PEN | Refills: 0 | Status: ON HOLD | OUTPATIENT
Start: 2021-01-01 | End: 2021-01-01

## 2021-01-01 RX ORDER — FUROSEMIDE 10 MG/ML
20 INJECTION INTRAMUSCULAR; INTRAVENOUS 2 TIMES DAILY
Status: DISCONTINUED | OUTPATIENT
Start: 2021-01-01 | End: 2021-01-01

## 2021-01-01 RX ORDER — DEXTROSE, SODIUM CHLORIDE, SODIUM LACTATE, POTASSIUM CHLORIDE, AND CALCIUM CHLORIDE 5; .6; .31; .03; .02 G/100ML; G/100ML; G/100ML; G/100ML; G/100ML
1000 INJECTION, SOLUTION INTRAVENOUS CONTINUOUS
Status: DISCONTINUED | OUTPATIENT
Start: 2021-01-01 | End: 2021-01-01

## 2021-01-01 RX ORDER — AMITRIPTYLINE HYDROCHLORIDE 50 MG/1
100 TABLET, FILM COATED ORAL NIGHTLY
Status: CANCELLED | OUTPATIENT
Start: 2021-01-01

## 2021-01-01 RX ORDER — HALOPERIDOL 5 MG/ML
2.5 INJECTION INTRAMUSCULAR ONCE
Status: COMPLETED | OUTPATIENT
Start: 2021-01-01 | End: 2021-01-01

## 2021-01-01 RX ORDER — FUROSEMIDE 40 MG/1
40 TABLET ORAL DAILY
Status: COMPLETED | OUTPATIENT
Start: 2021-01-01 | End: 2021-01-01

## 2021-01-01 RX ORDER — AMITRIPTYLINE HYDROCHLORIDE 100 MG/1
TABLET, FILM COATED ORAL
Qty: 30 TABLET | Refills: 2 | Status: SHIPPED | OUTPATIENT
Start: 2021-01-01

## 2021-01-01 RX ORDER — VANCOMYCIN HYDROCHLORIDE 750 MG/15ML
750 INJECTION, POWDER, LYOPHILIZED, FOR SOLUTION INTRAVENOUS DAILY
Status: ON HOLD | COMMUNITY
End: 2021-01-01 | Stop reason: HOSPADM

## 2021-01-01 RX ORDER — HEPARIN SODIUM 10000 [USP'U]/100ML
5-30 INJECTION, SOLUTION INTRAVENOUS CONTINUOUS
Status: DISCONTINUED | OUTPATIENT
Start: 2021-01-01 | End: 2021-01-01 | Stop reason: SDUPTHER

## 2021-01-01 RX ORDER — LABETALOL 20 MG/4 ML (5 MG/ML) INTRAVENOUS SYRINGE
10 EVERY 4 HOURS PRN
Status: DISCONTINUED | OUTPATIENT
Start: 2021-01-01 | End: 2021-01-01 | Stop reason: HOSPADM

## 2021-01-01 RX ORDER — ESCITALOPRAM OXALATE 10 MG/1
10 TABLET ORAL DAILY
Status: CANCELLED | OUTPATIENT
Start: 2021-01-01

## 2021-01-01 RX ORDER — DEXTROSE MONOHYDRATE 25 G/50ML
25 INJECTION, SOLUTION INTRAVENOUS ONCE
Status: COMPLETED | OUTPATIENT
Start: 2021-01-01 | End: 2021-01-01

## 2021-01-01 RX ORDER — INSULIN LISPRO 100 [IU]/ML
0-12 INJECTION, SOLUTION INTRAVENOUS; SUBCUTANEOUS
Status: DISCONTINUED | OUTPATIENT
Start: 2021-01-01 | End: 2021-01-01 | Stop reason: HOSPADM

## 2021-01-01 RX ORDER — ONDANSETRON 2 MG/ML
4 INJECTION INTRAMUSCULAR; INTRAVENOUS
Status: DISCONTINUED | OUTPATIENT
Start: 2021-01-01 | End: 2021-01-01 | Stop reason: HOSPADM

## 2021-01-01 RX ORDER — ACETAMINOPHEN 650 MG/1
650 SUPPOSITORY RECTAL EVERY 6 HOURS PRN
Status: CANCELLED | OUTPATIENT
Start: 2021-01-01

## 2021-01-01 RX ORDER — FUROSEMIDE 10 MG/ML
10 INJECTION INTRAMUSCULAR; INTRAVENOUS ONCE
Status: COMPLETED | OUTPATIENT
Start: 2021-01-01 | End: 2021-01-01

## 2021-01-01 RX ORDER — NICOTINE POLACRILEX 4 MG
15 LOZENGE BUCCAL PRN
Status: DISCONTINUED | OUTPATIENT
Start: 2021-01-01 | End: 2021-01-01 | Stop reason: SDUPTHER

## 2021-01-01 RX ORDER — MIDAZOLAM HYDROCHLORIDE 1 MG/ML
INJECTION INTRAMUSCULAR; INTRAVENOUS PRN
Status: DISCONTINUED | OUTPATIENT
Start: 2021-01-01 | End: 2021-01-01 | Stop reason: SDUPTHER

## 2021-01-01 RX ORDER — INSULIN LISPRO 100 [IU]/ML
0-9 INJECTION, SOLUTION INTRAVENOUS; SUBCUTANEOUS NIGHTLY
Status: DISCONTINUED | OUTPATIENT
Start: 2021-01-01 | End: 2021-01-01 | Stop reason: HOSPADM

## 2021-01-01 RX ORDER — ONDANSETRON 2 MG/ML
4 INJECTION INTRAMUSCULAR; INTRAVENOUS EVERY 6 HOURS PRN
Status: CANCELLED | OUTPATIENT
Start: 2021-01-01

## 2021-01-01 RX ORDER — PANTOPRAZOLE SODIUM 40 MG/1
40 TABLET, DELAYED RELEASE ORAL DAILY
Status: DISCONTINUED | OUTPATIENT
Start: 2021-01-01 | End: 2021-01-01 | Stop reason: HOSPADM

## 2021-01-01 RX ORDER — METOPROLOL SUCCINATE 50 MG/1
50 TABLET, EXTENDED RELEASE ORAL DAILY
Status: DISCONTINUED | OUTPATIENT
Start: 2021-01-01 | End: 2021-01-01 | Stop reason: HOSPADM

## 2021-01-01 RX ORDER — CLINDAMYCIN HYDROCHLORIDE 300 MG/1
300 CAPSULE ORAL 3 TIMES DAILY
Qty: 30 CAPSULE | Refills: 0 | Status: SHIPPED | OUTPATIENT
Start: 2021-01-01 | End: 2021-01-01

## 2021-01-01 RX ORDER — NICOTINE POLACRILEX 4 MG
15 LOZENGE BUCCAL PRN
Status: DISCONTINUED | OUTPATIENT
Start: 2021-01-01 | End: 2021-01-01 | Stop reason: HOSPADM

## 2021-01-01 RX ORDER — SODIUM CHLORIDE 0.9 % (FLUSH) 0.9 %
5-40 SYRINGE (ML) INJECTION EVERY 12 HOURS SCHEDULED
Status: DISCONTINUED | OUTPATIENT
Start: 2021-01-01 | End: 2021-01-01 | Stop reason: HOSPADM

## 2021-01-01 RX ORDER — INSULIN LISPRO 100 [IU]/ML
0-9 INJECTION, SOLUTION INTRAVENOUS; SUBCUTANEOUS NIGHTLY
Qty: 1 PEN | Refills: 1 | Status: SHIPPED | OUTPATIENT
Start: 2021-01-01

## 2021-01-01 RX ORDER — ACETAMINOPHEN 325 MG/1
650 TABLET ORAL EVERY 6 HOURS PRN
Status: CANCELLED | OUTPATIENT
Start: 2021-01-01

## 2021-01-01 RX ORDER — POLYETHYLENE GLYCOL 3350 17 G/17G
17 POWDER, FOR SOLUTION ORAL DAILY PRN
Status: CANCELLED | OUTPATIENT
Start: 2021-01-01

## 2021-01-01 RX ORDER — METHADONE HYDROCHLORIDE 10 MG/1
100 TABLET ORAL DAILY
Status: DISCONTINUED | OUTPATIENT
Start: 2021-01-01 | End: 2021-01-01 | Stop reason: HOSPADM

## 2021-01-01 RX ORDER — AMITRIPTYLINE HYDROCHLORIDE 100 MG/1
TABLET, FILM COATED ORAL
Qty: 30 TABLET | Refills: 2 | Status: SHIPPED | OUTPATIENT
Start: 2021-01-01 | End: 2021-01-01 | Stop reason: SDUPTHER

## 2021-01-01 RX ORDER — DOXYCYCLINE HYCLATE 100 MG
100 TABLET ORAL 2 TIMES DAILY
Qty: 10 TABLET | Refills: 0 | Status: SHIPPED | OUTPATIENT
Start: 2021-01-01 | End: 2021-01-01

## 2021-01-01 RX ORDER — HYDROXYZINE PAMOATE 25 MG/1
50 CAPSULE ORAL EVERY 8 HOURS PRN
Status: DISCONTINUED | OUTPATIENT
Start: 2021-01-01 | End: 2021-01-01 | Stop reason: HOSPADM

## 2021-01-01 RX ORDER — ERGOCALCIFEROL 1.25 MG/1
50000 CAPSULE ORAL WEEKLY
COMMUNITY

## 2021-01-01 RX ORDER — PANTOPRAZOLE SODIUM 40 MG/1
40 TABLET, DELAYED RELEASE ORAL
Status: CANCELLED | OUTPATIENT
Start: 2021-01-01

## 2021-01-01 RX ORDER — LIDOCAINE HYDROCHLORIDE 10 MG/ML
5 INJECTION, SOLUTION EPIDURAL; INFILTRATION; INTRACAUDAL; PERINEURAL ONCE
Status: DISCONTINUED | OUTPATIENT
Start: 2021-01-01 | End: 2021-01-01 | Stop reason: HOSPADM

## 2021-01-01 RX ORDER — ESCITALOPRAM OXALATE 10 MG/1
10 TABLET ORAL DAILY
Qty: 30 TABLET | Refills: 1 | Status: SHIPPED | OUTPATIENT
Start: 2021-01-01 | End: 2021-01-01 | Stop reason: SDUPTHER

## 2021-01-01 RX ORDER — HYDRALAZINE HYDROCHLORIDE 20 MG/ML
5 INJECTION INTRAMUSCULAR; INTRAVENOUS EVERY 6 HOURS PRN
Status: DISCONTINUED | OUTPATIENT
Start: 2021-01-01 | End: 2021-01-01

## 2021-01-01 RX ORDER — METHADONE HYDROCHLORIDE 10 MG/ML
140 CONCENTRATE ORAL DAILY
Status: ON HOLD | COMMUNITY
End: 2021-01-01 | Stop reason: SDUPTHER

## 2021-01-01 RX ORDER — MEROPENEM 1 G/1
1000 INJECTION, POWDER, FOR SOLUTION INTRAVENOUS EVERY 12 HOURS
Status: CANCELLED | OUTPATIENT
Start: 2021-01-01

## 2021-01-01 RX ORDER — GABAPENTIN 100 MG/1
200 CAPSULE ORAL 2 TIMES DAILY
Qty: 120 CAPSULE | Refills: 0 | Status: ON HOLD | OUTPATIENT
Start: 2021-01-01 | End: 2021-01-01

## 2021-01-01 RX ORDER — OXYCODONE HYDROCHLORIDE 5 MG/1
5 TABLET ORAL EVERY 8 HOURS PRN
Qty: 6 TABLET | Refills: 0 | Status: SHIPPED | OUTPATIENT
Start: 2021-01-01 | End: 2021-01-01

## 2021-01-01 RX ORDER — ACETAMINOPHEN 325 MG/1
650 TABLET ORAL EVERY 4 HOURS PRN
COMMUNITY

## 2021-01-01 RX ORDER — AMMONIUM LACTATE 12 G/100G
LOTION TOPICAL DAILY
Status: DISCONTINUED | OUTPATIENT
Start: 2021-01-01 | End: 2021-01-01 | Stop reason: HOSPADM

## 2021-01-01 RX ORDER — EPINEPHRINE 0.1 MG/ML
1 SYRINGE (ML) INJECTION
Status: COMPLETED | OUTPATIENT
Start: 2021-01-01 | End: 2021-01-01

## 2021-01-01 RX ORDER — FUROSEMIDE 40 MG/1
40 TABLET ORAL 2 TIMES DAILY
Status: ON HOLD | COMMUNITY
End: 2021-01-01 | Stop reason: HOSPADM

## 2021-01-01 RX ORDER — FUROSEMIDE 40 MG/1
40 TABLET ORAL 2 TIMES DAILY
Status: DISCONTINUED | OUTPATIENT
Start: 2021-01-01 | End: 2021-01-01 | Stop reason: HOSPADM

## 2021-01-01 RX ORDER — INSULIN LISPRO 100 [IU]/ML
0-18 INJECTION, SOLUTION INTRAVENOUS; SUBCUTANEOUS
Status: DISCONTINUED | OUTPATIENT
Start: 2021-01-01 | End: 2021-01-01 | Stop reason: HOSPADM

## 2021-01-01 RX ORDER — HYDRALAZINE HYDROCHLORIDE 20 MG/ML
5 INJECTION INTRAMUSCULAR; INTRAVENOUS EVERY 6 HOURS PRN
Status: DISCONTINUED | OUTPATIENT
Start: 2021-01-01 | End: 2021-01-01 | Stop reason: HOSPADM

## 2021-01-01 RX ORDER — EPINEPHRINE 0.1 MG/ML
SYRINGE (ML) INJECTION
Status: COMPLETED
Start: 2021-01-01 | End: 2021-01-01

## 2021-01-01 RX ORDER — FUROSEMIDE 10 MG/ML
60 INJECTION INTRAMUSCULAR; INTRAVENOUS 2 TIMES DAILY
Status: DISCONTINUED | OUTPATIENT
Start: 2021-01-01 | End: 2021-01-01

## 2021-01-01 RX ORDER — AMOXICILLIN AND CLAVULANATE POTASSIUM 875; 125 MG/1; MG/1
1 TABLET, FILM COATED ORAL 2 TIMES DAILY
Qty: 28 TABLET | Refills: 0 | Status: SHIPPED | OUTPATIENT
Start: 2021-01-01 | End: 2021-01-01

## 2021-01-01 RX ORDER — APIXABAN 5 MG/1
TABLET, FILM COATED ORAL
Qty: 60 TABLET | Refills: 1 | Status: SHIPPED | OUTPATIENT
Start: 2021-01-01 | End: 2021-01-01

## 2021-01-01 RX ORDER — EPHEDRINE SULFATE 50 MG/ML
INJECTION INTRAVENOUS PRN
Status: DISCONTINUED | OUTPATIENT
Start: 2021-01-01 | End: 2021-01-01 | Stop reason: SDUPTHER

## 2021-01-01 RX ORDER — HEPARIN SODIUM 5000 [USP'U]/ML
5000 INJECTION, SOLUTION INTRAVENOUS; SUBCUTANEOUS EVERY 8 HOURS SCHEDULED
Status: DISCONTINUED | OUTPATIENT
Start: 2021-01-01 | End: 2021-01-01 | Stop reason: HOSPADM

## 2021-01-01 RX ORDER — HEPARIN SODIUM 10000 [USP'U]/100ML
15 INJECTION, SOLUTION INTRAVENOUS CONTINUOUS
Status: DISCONTINUED | OUTPATIENT
Start: 2021-01-01 | End: 2021-01-01

## 2021-01-01 RX ORDER — ETOMIDATE 2 MG/ML
INJECTION INTRAVENOUS
Status: COMPLETED
Start: 2021-01-01 | End: 2021-01-01

## 2021-01-01 RX ORDER — AMMONIUM LACTATE 12 G/100G
LOTION TOPICAL
Qty: 1 BOTTLE | Refills: 0 | Status: SHIPPED | OUTPATIENT
Start: 2021-01-01 | End: 2021-01-01 | Stop reason: CLARIF

## 2021-01-01 RX ORDER — LABETALOL HYDROCHLORIDE 5 MG/ML
10 INJECTION, SOLUTION INTRAVENOUS EVERY 4 HOURS PRN
Status: DISCONTINUED | OUTPATIENT
Start: 2021-01-01 | End: 2021-01-01 | Stop reason: HOSPADM

## 2021-01-01 RX ORDER — DOXAZOSIN MESYLATE 4 MG/1
4 TABLET ORAL 2 TIMES DAILY
Qty: 30 TABLET | Refills: 1 | Status: SHIPPED | OUTPATIENT
Start: 2021-01-01 | End: 2021-01-01 | Stop reason: SDUPTHER

## 2021-01-01 RX ORDER — PANTOPRAZOLE SODIUM 40 MG/1
40 TABLET, DELAYED RELEASE ORAL
Status: DISCONTINUED | OUTPATIENT
Start: 2021-01-01 | End: 2021-01-01 | Stop reason: HOSPADM

## 2021-01-01 RX ORDER — ESCITALOPRAM OXALATE 10 MG/1
10 TABLET ORAL DAILY
Qty: 30 TABLET | Refills: 2 | Status: SHIPPED | OUTPATIENT
Start: 2021-01-01

## 2021-01-01 RX ORDER — DEXTROSE MONOHYDRATE 25 G/50ML
25 INJECTION, SOLUTION INTRAVENOUS PRN
Status: DISCONTINUED | OUTPATIENT
Start: 2021-01-01 | End: 2021-01-01

## 2021-01-01 RX ORDER — METOPROLOL SUCCINATE 50 MG/1
50 TABLET, EXTENDED RELEASE ORAL DAILY
Qty: 30 TABLET | Refills: 3 | Status: SHIPPED | OUTPATIENT
Start: 2021-01-01

## 2021-01-01 RX ORDER — HYDRALAZINE HYDROCHLORIDE 50 MG/1
50 TABLET, FILM COATED ORAL EVERY 8 HOURS SCHEDULED
Status: DISCONTINUED | OUTPATIENT
Start: 2021-01-01 | End: 2021-01-01 | Stop reason: HOSPADM

## 2021-01-01 RX ORDER — DEXTROSE MONOHYDRATE 25 G/50ML
25 INJECTION, SOLUTION INTRAVENOUS ONCE
Status: DISCONTINUED | OUTPATIENT
Start: 2021-01-01 | End: 2021-01-01

## 2021-01-01 RX ORDER — GABAPENTIN 100 MG/1
200 CAPSULE ORAL 2 TIMES DAILY
Status: DISCONTINUED | OUTPATIENT
Start: 2021-01-01 | End: 2021-01-01 | Stop reason: HOSPADM

## 2021-01-01 RX ORDER — HYDRALAZINE HYDROCHLORIDE 20 MG/ML
10 INJECTION INTRAMUSCULAR; INTRAVENOUS ONCE
Status: DISCONTINUED | OUTPATIENT
Start: 2021-01-01 | End: 2021-01-01

## 2021-01-01 RX ORDER — LORAZEPAM 2 MG/ML
INJECTION INTRAMUSCULAR
Status: DISPENSED
Start: 2021-01-01 | End: 2021-01-01

## 2021-01-01 RX ORDER — HEPARIN SODIUM 1000 [USP'U]/ML
40 INJECTION, SOLUTION INTRAVENOUS; SUBCUTANEOUS PRN
Status: DISCONTINUED | OUTPATIENT
Start: 2021-01-01 | End: 2021-01-01 | Stop reason: ALTCHOICE

## 2021-01-01 RX ORDER — INSULIN LISPRO 100 [IU]/ML
0-6 INJECTION, SOLUTION INTRAVENOUS; SUBCUTANEOUS EVERY 6 HOURS
Status: DISCONTINUED | OUTPATIENT
Start: 2021-01-01 | End: 2021-01-01

## 2021-01-01 RX ORDER — INSULIN LISPRO 100 [IU]/ML
0-6 INJECTION, SOLUTION INTRAVENOUS; SUBCUTANEOUS NIGHTLY
Status: DISCONTINUED | OUTPATIENT
Start: 2021-01-01 | End: 2021-01-01

## 2021-01-01 RX ORDER — ATORVASTATIN CALCIUM 20 MG/1
20 TABLET, FILM COATED ORAL NIGHTLY
Status: CANCELLED | OUTPATIENT
Start: 2021-01-01

## 2021-01-01 RX ORDER — ESCITALOPRAM OXALATE 10 MG/1
10 TABLET ORAL DAILY
Qty: 30 TABLET | Refills: 1 | Status: SHIPPED | OUTPATIENT
Start: 2021-01-01 | End: 2021-01-01

## 2021-01-01 RX ORDER — SODIUM CHLORIDE 9 MG/ML
INJECTION, SOLUTION INTRAVENOUS PRN
Status: COMPLETED | OUTPATIENT
Start: 2021-01-01 | End: 2021-01-01

## 2021-01-01 RX ORDER — CLOTRIMAZOLE 1 %
CREAM (GRAM) TOPICAL 2 TIMES DAILY
Status: DISCONTINUED | OUTPATIENT
Start: 2021-01-01 | End: 2021-01-01

## 2021-01-01 RX ORDER — FUROSEMIDE 10 MG/ML
40 INJECTION INTRAMUSCULAR; INTRAVENOUS 2 TIMES DAILY
Status: DISCONTINUED | OUTPATIENT
Start: 2021-01-01 | End: 2021-01-01

## 2021-01-01 RX ORDER — FENTANYL CITRATE 50 UG/ML
INJECTION, SOLUTION INTRAMUSCULAR; INTRAVENOUS PRN
Status: DISCONTINUED | OUTPATIENT
Start: 2021-01-01 | End: 2021-01-01 | Stop reason: SDUPTHER

## 2021-01-01 RX ORDER — FENTANYL CITRATE 50 UG/ML
INJECTION, SOLUTION INTRAMUSCULAR; INTRAVENOUS
Status: COMPLETED
Start: 2021-01-01 | End: 2021-01-01

## 2021-01-01 RX ORDER — SODIUM CHLORIDE 9 MG/ML
INJECTION, SOLUTION INTRAVENOUS PRN
Status: DISCONTINUED | OUTPATIENT
Start: 2021-01-01 | End: 2021-01-01 | Stop reason: SDUPTHER

## 2021-01-01 RX ORDER — INSULIN LISPRO 100 [IU]/ML
5 INJECTION, SOLUTION INTRAVENOUS; SUBCUTANEOUS
Status: DISCONTINUED | OUTPATIENT
Start: 2021-01-01 | End: 2021-01-01

## 2021-01-01 RX ORDER — TORSEMIDE 20 MG/1
40 TABLET ORAL DAILY
Qty: 60 TABLET | Refills: 0 | DISCHARGE
Start: 2021-01-01

## 2021-01-01 RX ORDER — FUROSEMIDE 40 MG/1
40 TABLET ORAL 2 TIMES DAILY
Status: DISCONTINUED | OUTPATIENT
Start: 2021-01-01 | End: 2021-01-01

## 2021-01-01 RX ORDER — LORAZEPAM 0.5 MG/1
0.5 TABLET ORAL ONCE
Status: DISCONTINUED | OUTPATIENT
Start: 2021-01-01 | End: 2021-01-01

## 2021-01-01 RX ORDER — AMOXICILLIN AND CLAVULANATE POTASSIUM 875; 125 MG/1; MG/1
1 TABLET, FILM COATED ORAL 2 TIMES DAILY
Qty: 20 TABLET | Refills: 0 | Status: SHIPPED | OUTPATIENT
Start: 2021-01-01 | End: 2021-01-01

## 2021-01-01 RX ORDER — PANTOPRAZOLE SODIUM 40 MG/1
40 TABLET, DELAYED RELEASE ORAL
Status: DISCONTINUED | OUTPATIENT
Start: 2021-01-01 | End: 2021-01-01

## 2021-01-01 RX ORDER — INSULIN GLARGINE 100 [IU]/ML
7 INJECTION, SOLUTION SUBCUTANEOUS NIGHTLY
Qty: 5 PEN | Refills: 0 | Status: ON HOLD
Start: 2021-01-01 | End: 2021-01-01 | Stop reason: HOSPADM

## 2021-01-01 RX ORDER — LIDOCAINE HYDROCHLORIDE 20 MG/ML
INJECTION, SOLUTION INFILTRATION; PERINEURAL PRN
Status: DISCONTINUED | OUTPATIENT
Start: 2021-01-01 | End: 2021-01-01 | Stop reason: ALTCHOICE

## 2021-01-01 RX ORDER — BUPIVACAINE HYDROCHLORIDE 2.5 MG/ML
INJECTION, SOLUTION EPIDURAL; INFILTRATION; INTRACAUDAL PRN
Status: DISCONTINUED | OUTPATIENT
Start: 2021-01-01 | End: 2021-01-01 | Stop reason: ALTCHOICE

## 2021-01-01 RX ORDER — HEPARIN SODIUM 10000 [USP'U]/100ML
18 INJECTION, SOLUTION INTRAVENOUS CONTINUOUS
Status: DISPENSED | OUTPATIENT
Start: 2021-01-01 | End: 2021-01-01

## 2021-01-01 RX ORDER — NALOXONE HYDROCHLORIDE 1 MG/ML
INJECTION INTRAMUSCULAR; INTRAVENOUS; SUBCUTANEOUS
Status: COMPLETED
Start: 2021-01-01 | End: 2021-01-01

## 2021-01-01 RX ORDER — SODIUM CHLORIDE 9 MG/ML
INJECTION, SOLUTION INTRAVENOUS CONTINUOUS PRN
Status: DISCONTINUED | OUTPATIENT
Start: 2021-01-01 | End: 2021-01-01 | Stop reason: SDUPTHER

## 2021-01-01 RX ORDER — NALOXONE HYDROCHLORIDE 0.4 MG/ML
0.4 INJECTION, SOLUTION INTRAMUSCULAR; INTRAVENOUS; SUBCUTANEOUS PRN
Status: DISCONTINUED | OUTPATIENT
Start: 2021-01-01 | End: 2021-01-01 | Stop reason: HOSPADM

## 2021-01-01 RX ORDER — BUPIVACAINE HYDROCHLORIDE 2.5 MG/ML
INJECTION, SOLUTION EPIDURAL; INFILTRATION; INTRACAUDAL
Status: COMPLETED
Start: 2021-01-01 | End: 2021-01-01

## 2021-01-01 RX ORDER — HEPARIN SODIUM 10000 [USP'U]/100ML
5-30 INJECTION, SOLUTION INTRAVENOUS CONTINUOUS
Status: DISCONTINUED | OUTPATIENT
Start: 2021-01-01 | End: 2021-01-01

## 2021-01-01 RX ORDER — MEROPENEM 1 G/1
1000 INJECTION, POWDER, FOR SOLUTION INTRAVENOUS EVERY 12 HOURS
Status: DISCONTINUED | OUTPATIENT
Start: 2021-01-01 | End: 2021-01-01 | Stop reason: SDUPTHER

## 2021-01-01 RX ORDER — METHADONE HYDROCHLORIDE 10 MG/1
120 TABLET ORAL DAILY
Status: DISCONTINUED | OUTPATIENT
Start: 2021-01-01 | End: 2021-01-01 | Stop reason: HOSPADM

## 2021-01-01 RX ORDER — SODIUM CHLORIDE 9 MG/ML
25 INJECTION, SOLUTION INTRAVENOUS PRN
Status: CANCELLED | OUTPATIENT
Start: 2021-01-01

## 2021-01-01 RX ORDER — FUROSEMIDE 40 MG/1
40 TABLET ORAL 2 TIMES DAILY
Qty: 60 TABLET | Refills: 0
Start: 2021-01-01 | End: 2021-01-01 | Stop reason: HOSPADM

## 2021-01-01 RX ORDER — METHADONE HYDROCHLORIDE 10 MG/ML
140 CONCENTRATE ORAL DAILY
Status: DISCONTINUED | OUTPATIENT
Start: 2021-01-01 | End: 2021-01-01 | Stop reason: HOSPADM

## 2021-01-01 RX ORDER — HYDRALAZINE HYDROCHLORIDE 50 MG/1
50 TABLET, FILM COATED ORAL
COMMUNITY
Start: 2021-01-01

## 2021-01-01 RX ORDER — BACITRACIN ZINC AND POLYMYXIN B SULFATE 500; 1000 [USP'U]/G; [USP'U]/G
OINTMENT TOPICAL PRN
Status: DISCONTINUED | OUTPATIENT
Start: 2021-01-01 | End: 2021-01-01 | Stop reason: ALTCHOICE

## 2021-01-01 RX ORDER — OMEPRAZOLE 20 MG/1
20 CAPSULE, DELAYED RELEASE ORAL EVERY MORNING
Qty: 30 CAPSULE | Refills: 0 | OUTPATIENT
Start: 2021-01-01

## 2021-01-01 RX ORDER — BUPRENORPHINE 2 MG/1
2 TABLET SUBLINGUAL PRN
Status: ACTIVE | OUTPATIENT
Start: 2021-01-01 | End: 2021-01-01

## 2021-01-01 RX ORDER — HYDRALAZINE HYDROCHLORIDE 50 MG/1
50 TABLET, FILM COATED ORAL ONCE
Status: COMPLETED | OUTPATIENT
Start: 2021-01-01 | End: 2021-01-01

## 2021-01-01 RX ORDER — AMMONIUM LACTATE 12 G/100G
LOTION TOPICAL DAILY
Status: CANCELLED | OUTPATIENT
Start: 2021-01-01

## 2021-01-01 RX ORDER — NALOXONE HYDROCHLORIDE 0.4 MG/ML
0.2 INJECTION, SOLUTION INTRAMUSCULAR; INTRAVENOUS; SUBCUTANEOUS PRN
Status: DISCONTINUED | OUTPATIENT
Start: 2021-01-01 | End: 2021-01-01 | Stop reason: HOSPADM

## 2021-01-01 RX ORDER — METHADONE HYDROCHLORIDE 10 MG/ML
75 CONCENTRATE ORAL 2 TIMES DAILY
Status: DISCONTINUED | OUTPATIENT
Start: 2021-01-01 | End: 2021-01-01

## 2021-01-01 RX ORDER — HYDRALAZINE HYDROCHLORIDE 50 MG/1
50 TABLET, FILM COATED ORAL EVERY 8 HOURS SCHEDULED
Qty: 90 TABLET | Refills: 3 | Status: ON HOLD | OUTPATIENT
Start: 2021-01-01 | End: 2021-01-01 | Stop reason: HOSPADM

## 2021-01-01 RX ORDER — ONDANSETRON 2 MG/ML
INJECTION INTRAMUSCULAR; INTRAVENOUS PRN
Status: DISCONTINUED | OUTPATIENT
Start: 2021-01-01 | End: 2021-01-01 | Stop reason: SDUPTHER

## 2021-01-01 RX ORDER — BUPRENORPHINE AND NALOXONE 4; 1 MG/1; MG/1
1 FILM, SOLUBLE BUCCAL; SUBLINGUAL 2 TIMES DAILY
COMMUNITY
Start: 2021-01-01

## 2021-01-01 RX ORDER — DOXAZOSIN MESYLATE 4 MG/1
4 TABLET ORAL 2 TIMES DAILY
Status: DISCONTINUED | OUTPATIENT
Start: 2021-01-01 | End: 2021-01-01 | Stop reason: HOSPADM

## 2021-01-01 RX ORDER — DOXAZOSIN MESYLATE 4 MG/1
4 TABLET ORAL 2 TIMES DAILY
Qty: 180 TABLET | Refills: 1 | OUTPATIENT
Start: 2021-01-01

## 2021-01-01 RX ORDER — POLYETHYLENE GLYCOL 3350 17 G/17G
17 POWDER, FOR SOLUTION ORAL DAILY
Status: DISCONTINUED | OUTPATIENT
Start: 2021-01-01 | End: 2021-01-01 | Stop reason: HOSPADM

## 2021-01-01 RX ORDER — FUROSEMIDE 40 MG/1
40 TABLET ORAL DAILY
Status: CANCELLED | OUTPATIENT
Start: 2021-01-01

## 2021-01-01 RX ORDER — OXYCODONE HYDROCHLORIDE 5 MG/1
5 TABLET ORAL EVERY 4 HOURS PRN
Status: DISCONTINUED | OUTPATIENT
Start: 2021-01-01 | End: 2021-01-01 | Stop reason: HOSPADM

## 2021-01-01 RX ORDER — AMITRIPTYLINE HYDROCHLORIDE 50 MG/1
50 TABLET, FILM COATED ORAL NIGHTLY
Status: DISCONTINUED | OUTPATIENT
Start: 2021-01-01 | End: 2021-01-01 | Stop reason: HOSPADM

## 2021-01-01 RX ORDER — METHADONE HYDROCHLORIDE 10 MG/ML
80 CONCENTRATE ORAL DAILY
Status: DISCONTINUED | OUTPATIENT
Start: 2021-01-01 | End: 2021-01-01

## 2021-01-01 RX ORDER — ONDANSETRON 2 MG/ML
4 INJECTION INTRAMUSCULAR; INTRAVENOUS
Status: DISCONTINUED | OUTPATIENT
Start: 2021-01-01 | End: 2021-01-01

## 2021-01-01 RX ORDER — DEXTROSE AND SODIUM CHLORIDE 5; .45 G/100ML; G/100ML
INJECTION, SOLUTION INTRAVENOUS CONTINUOUS
Status: DISCONTINUED | OUTPATIENT
Start: 2021-01-01 | End: 2021-01-01

## 2021-01-01 RX ORDER — NALOXONE HYDROCHLORIDE 0.4 MG/ML
INJECTION, SOLUTION INTRAMUSCULAR; INTRAVENOUS; SUBCUTANEOUS
Status: DISPENSED
Start: 2021-01-01 | End: 2021-01-01

## 2021-01-01 RX ORDER — INSULIN LISPRO 100 [IU]/ML
0-6 INJECTION, SOLUTION INTRAVENOUS; SUBCUTANEOUS
Status: DISCONTINUED | OUTPATIENT
Start: 2021-01-01 | End: 2021-01-01 | Stop reason: HOSPADM

## 2021-01-01 RX ORDER — ATORVASTATIN CALCIUM 40 MG/1
40 TABLET, FILM COATED ORAL NIGHTLY
COMMUNITY

## 2021-01-01 RX ORDER — POLYETHYLENE GLYCOL 3350 17 G/17G
17 POWDER, FOR SOLUTION ORAL DAILY PRN
Status: DISCONTINUED | OUTPATIENT
Start: 2021-01-01 | End: 2021-01-01

## 2021-01-01 RX ORDER — DOXAZOSIN MESYLATE 4 MG/1
4 TABLET ORAL 2 TIMES DAILY
Status: DISCONTINUED | OUTPATIENT
Start: 2021-01-01 | End: 2021-01-01

## 2021-01-01 RX ORDER — DEXTROSE, SODIUM CHLORIDE, SODIUM LACTATE, POTASSIUM CHLORIDE, AND CALCIUM CHLORIDE 5; .6; .31; .03; .02 G/100ML; G/100ML; G/100ML; G/100ML; G/100ML
1000 INJECTION, SOLUTION INTRAVENOUS CONTINUOUS
Status: CANCELLED | OUTPATIENT
Start: 2021-01-01

## 2021-01-01 RX ORDER — FUROSEMIDE 10 MG/ML
40 INJECTION INTRAMUSCULAR; INTRAVENOUS DAILY
Status: DISCONTINUED | OUTPATIENT
Start: 2021-01-01 | End: 2021-01-01

## 2021-01-01 RX ORDER — FUROSEMIDE 10 MG/ML
20 INJECTION INTRAMUSCULAR; INTRAVENOUS DAILY
Status: DISCONTINUED | OUTPATIENT
Start: 2021-01-01 | End: 2021-01-01

## 2021-01-01 RX ORDER — ALBUTEROL SULFATE 2.5 MG/3ML
2.5 SOLUTION RESPIRATORY (INHALATION) EVERY 6 HOURS PRN
Status: DISCONTINUED | OUTPATIENT
Start: 2021-01-01 | End: 2021-01-01 | Stop reason: HOSPADM

## 2021-01-01 RX ORDER — LABETALOL HYDROCHLORIDE 5 MG/ML
5 INJECTION, SOLUTION INTRAVENOUS EVERY 10 MIN PRN
Status: DISCONTINUED | OUTPATIENT
Start: 2021-01-01 | End: 2021-01-01 | Stop reason: HOSPADM

## 2021-01-01 RX ORDER — SODIUM CHLORIDE 9 MG/ML
INJECTION, SOLUTION INTRAVENOUS CONTINUOUS
Status: DISCONTINUED | OUTPATIENT
Start: 2021-01-01 | End: 2021-01-01

## 2021-01-01 RX ORDER — SODIUM CHLORIDE 9 MG/ML
INJECTION, SOLUTION INTRAVENOUS ONCE
Status: DISCONTINUED | OUTPATIENT
Start: 2021-01-01 | End: 2021-01-01 | Stop reason: HOSPADM

## 2021-01-01 RX ORDER — DOXYCYCLINE HYCLATE 100 MG
100 TABLET ORAL 2 TIMES DAILY
Qty: 14 TABLET | Refills: 0 | Status: SHIPPED | OUTPATIENT
Start: 2021-01-01 | End: 2021-01-01

## 2021-01-01 RX ORDER — HYDRALAZINE HYDROCHLORIDE 20 MG/ML
5 INJECTION INTRAMUSCULAR; INTRAVENOUS EVERY 10 MIN PRN
Status: DISCONTINUED | OUTPATIENT
Start: 2021-01-01 | End: 2021-01-01 | Stop reason: HOSPADM

## 2021-01-01 RX ORDER — INSULIN LISPRO 100 [IU]/ML
0-18 INJECTION, SOLUTION INTRAVENOUS; SUBCUTANEOUS
Qty: 1 PEN | Refills: 1 | Status: SHIPPED | OUTPATIENT
Start: 2021-01-01

## 2021-01-01 RX ORDER — OXYCODONE HYDROCHLORIDE 5 MG/1
5 TABLET ORAL EVERY 6 HOURS
Qty: 12 TABLET | Refills: 0 | Status: SHIPPED | OUTPATIENT
Start: 2021-01-01 | End: 2021-01-01 | Stop reason: CLARIF

## 2021-01-01 RX ORDER — FUROSEMIDE 10 MG/ML
40 INJECTION INTRAMUSCULAR; INTRAVENOUS ONCE
Status: DISCONTINUED | OUTPATIENT
Start: 2021-01-01 | End: 2021-01-01

## 2021-01-01 RX ORDER — ALBUTEROL SULFATE 90 UG/1
AEROSOL, METERED RESPIRATORY (INHALATION) PRN
Status: DISCONTINUED | OUTPATIENT
Start: 2021-01-01 | End: 2021-01-01 | Stop reason: SDUPTHER

## 2021-01-01 RX ORDER — INSULIN LISPRO 100 [IU]/ML
10 INJECTION, SOLUTION INTRAVENOUS; SUBCUTANEOUS
Status: DISCONTINUED | OUTPATIENT
Start: 2021-01-01 | End: 2021-01-01

## 2021-01-01 RX ORDER — SODIUM CHLORIDE 0.9 % (FLUSH) 0.9 %
5-40 SYRINGE (ML) INJECTION PRN
Status: CANCELLED | OUTPATIENT
Start: 2021-01-01

## 2021-01-01 RX ORDER — INSULIN LISPRO 100 [IU]/ML
0-6 INJECTION, SOLUTION INTRAVENOUS; SUBCUTANEOUS
Status: DISCONTINUED | OUTPATIENT
Start: 2021-01-01 | End: 2021-01-01

## 2021-01-01 RX ORDER — BUPIVACAINE HYDROCHLORIDE 2.5 MG/ML
INJECTION, SOLUTION EPIDURAL; INFILTRATION; INTRACAUDAL
Status: COMPLETED | OUTPATIENT
Start: 2021-01-01 | End: 2021-01-01

## 2021-01-01 RX ORDER — PROMETHAZINE HYDROCHLORIDE 25 MG/1
25 TABLET ORAL EVERY 6 HOURS PRN
Status: DISCONTINUED | OUTPATIENT
Start: 2021-01-01 | End: 2021-01-01 | Stop reason: HOSPADM

## 2021-01-01 RX ORDER — FUROSEMIDE 40 MG/1
40 TABLET ORAL DAILY
Qty: 30 TABLET | Refills: 0 | Status: ON HOLD | OUTPATIENT
Start: 2021-01-01 | End: 2021-01-01 | Stop reason: SDUPTHER

## 2021-01-01 RX ORDER — HYDRALAZINE HYDROCHLORIDE 20 MG/ML
5 INJECTION INTRAMUSCULAR; INTRAVENOUS EVERY 5 MIN PRN
Status: DISCONTINUED | OUTPATIENT
Start: 2021-01-01 | End: 2021-01-01 | Stop reason: HOSPADM

## 2021-01-01 RX ORDER — HEPARIN SODIUM 1000 [USP'U]/ML
40 INJECTION, SOLUTION INTRAVENOUS; SUBCUTANEOUS PRN
Status: DISCONTINUED | OUTPATIENT
Start: 2021-01-01 | End: 2021-01-01 | Stop reason: SDUPTHER

## 2021-01-01 RX ORDER — HEPARIN SODIUM 10000 [USP'U]/100ML
5-30 INJECTION, SOLUTION INTRAVENOUS CONTINUOUS
Status: DISPENSED | OUTPATIENT
Start: 2021-01-01 | End: 2021-01-01

## 2021-01-01 RX ORDER — DEXTROSE MONOHYDRATE 25 G/50ML
12.5 INJECTION, SOLUTION INTRAVENOUS PRN
Status: DISCONTINUED | OUTPATIENT
Start: 2021-01-01 | End: 2021-01-01

## 2021-01-01 RX ORDER — HYDRALAZINE HYDROCHLORIDE 20 MG/ML
5 INJECTION INTRAMUSCULAR; INTRAVENOUS EVERY 4 HOURS PRN
Status: DISCONTINUED | OUTPATIENT
Start: 2021-01-01 | End: 2021-01-01

## 2021-01-01 RX ORDER — PANTOPRAZOLE SODIUM 40 MG/1
40 TABLET, DELAYED RELEASE ORAL
Qty: 30 TABLET | Refills: 3 | Status: ON HOLD | OUTPATIENT
Start: 2021-01-01 | End: 2021-01-01

## 2021-01-01 RX ORDER — INSULIN ASPART 100 [IU]/ML
8 INJECTION, SOLUTION INTRAVENOUS; SUBCUTANEOUS
Status: ON HOLD | COMMUNITY
End: 2021-01-01 | Stop reason: HOSPADM

## 2021-01-01 RX ORDER — DOXYCYCLINE 100 MG/1
100 TABLET ORAL 2 TIMES DAILY
Qty: 20 TABLET | Refills: 0 | Status: SHIPPED | OUTPATIENT
Start: 2021-01-01 | End: 2021-01-01

## 2021-01-01 RX ORDER — ALBUMIN, HUMAN INJ 5% 5 %
SOLUTION INTRAVENOUS PRN
Status: DISCONTINUED | OUTPATIENT
Start: 2021-01-01 | End: 2021-01-01 | Stop reason: SDUPTHER

## 2021-01-01 RX ORDER — DIPHENHYDRAMINE HYDROCHLORIDE 50 MG/ML
12.5 INJECTION INTRAMUSCULAR; INTRAVENOUS
Status: DISCONTINUED | OUTPATIENT
Start: 2021-01-01 | End: 2021-01-01 | Stop reason: HOSPADM

## 2021-01-01 RX ORDER — METHADONE HYDROCHLORIDE 10 MG/ML
80 CONCENTRATE ORAL DAILY
Qty: 40 ML | Refills: 0
Start: 2021-01-01 | End: 2021-01-01 | Stop reason: HOSPADM

## 2021-01-01 RX ORDER — GABAPENTIN 100 MG/1
100 CAPSULE ORAL ONCE
Status: COMPLETED | OUTPATIENT
Start: 2021-01-01 | End: 2021-01-01

## 2021-01-01 RX ADMIN — FUROSEMIDE 60 MG: 10 INJECTION, SOLUTION INTRAMUSCULAR; INTRAVENOUS at 17:18

## 2021-01-01 RX ADMIN — SODIUM CHLORIDE, PRESERVATIVE FREE 10 ML: 5 INJECTION INTRAVENOUS at 22:35

## 2021-01-01 RX ADMIN — SODIUM CHLORIDE, PRESERVATIVE FREE 10 ML: 5 INJECTION INTRAVENOUS at 21:11

## 2021-01-01 RX ADMIN — ESCITALOPRAM OXALATE 10 MG: 10 TABLET ORAL at 10:12

## 2021-01-01 RX ADMIN — MICONAZOLE NITRATE: 20 POWDER TOPICAL at 20:29

## 2021-01-01 RX ADMIN — INSULIN LISPRO 9 UNITS: 100 INJECTION, SOLUTION INTRAVENOUS; SUBCUTANEOUS at 17:01

## 2021-01-01 RX ADMIN — SODIUM CHLORIDE, PRESERVATIVE FREE 10 ML: 5 INJECTION INTRAVENOUS at 20:38

## 2021-01-01 RX ADMIN — Medication 10 ML: at 01:45

## 2021-01-01 RX ADMIN — MEROPENEM 1000 MG: 1 INJECTION, POWDER, FOR SOLUTION INTRAVENOUS at 09:31

## 2021-01-01 RX ADMIN — CEFEPIME HYDROCHLORIDE 2000 MG: 2 INJECTION, POWDER, FOR SOLUTION INTRAVENOUS at 16:31

## 2021-01-01 RX ADMIN — INSULIN LISPRO 6 UNITS: 100 INJECTION, SOLUTION INTRAVENOUS; SUBCUTANEOUS at 12:49

## 2021-01-01 RX ADMIN — OXYCODONE 5 MG: 5 TABLET ORAL at 06:08

## 2021-01-01 RX ADMIN — METOPROLOL TARTRATE 25 MG: 25 TABLET, FILM COATED ORAL at 21:36

## 2021-01-01 RX ADMIN — INSULIN LISPRO 3 UNITS: 100 INJECTION, SOLUTION INTRAVENOUS; SUBCUTANEOUS at 08:02

## 2021-01-01 RX ADMIN — FUROSEMIDE 60 MG: 10 INJECTION, SOLUTION INTRAMUSCULAR; INTRAVENOUS at 17:40

## 2021-01-01 RX ADMIN — MICONAZOLE NITRATE: 20 POWDER TOPICAL at 23:06

## 2021-01-01 RX ADMIN — PANTOPRAZOLE SODIUM 40 MG: 40 TABLET, DELAYED RELEASE ORAL at 12:23

## 2021-01-01 RX ADMIN — INSULIN LISPRO 3 UNITS: 100 INJECTION, SOLUTION INTRAVENOUS; SUBCUTANEOUS at 18:05

## 2021-01-01 RX ADMIN — SODIUM CHLORIDE 25 ML: 9 INJECTION, SOLUTION INTRAVENOUS at 09:14

## 2021-01-01 RX ADMIN — INSULIN LISPRO 10 UNITS: 100 INJECTION, SOLUTION INTRAVENOUS; SUBCUTANEOUS at 12:33

## 2021-01-01 RX ADMIN — MEROPENEM 1000 MG: 1 INJECTION, POWDER, FOR SOLUTION INTRAVENOUS at 09:12

## 2021-01-01 RX ADMIN — ESCITALOPRAM OXALATE 10 MG: 10 TABLET ORAL at 09:09

## 2021-01-01 RX ADMIN — PANTOPRAZOLE SODIUM 40 MG: 40 TABLET, DELAYED RELEASE ORAL at 08:59

## 2021-01-01 RX ADMIN — MEROPENEM 1000 MG: 1 INJECTION, POWDER, FOR SOLUTION INTRAVENOUS at 05:07

## 2021-01-01 RX ADMIN — HYDRALAZINE HYDROCHLORIDE 50 MG: 50 TABLET, FILM COATED ORAL at 05:08

## 2021-01-01 RX ADMIN — APIXABAN 5 MG: 5 TABLET, FILM COATED ORAL at 21:16

## 2021-01-01 RX ADMIN — ATORVASTATIN CALCIUM 20 MG: 20 TABLET, FILM COATED ORAL at 20:54

## 2021-01-01 RX ADMIN — FLUCONAZOLE 200 MG: 200 INJECTION, SOLUTION INTRAVENOUS at 18:01

## 2021-01-01 RX ADMIN — VANCOMYCIN HYDROCHLORIDE 750 MG: 10 INJECTION, POWDER, LYOPHILIZED, FOR SOLUTION INTRAVENOUS at 13:12

## 2021-01-01 RX ADMIN — ATORVASTATIN CALCIUM 20 MG: 20 TABLET, FILM COATED ORAL at 00:19

## 2021-01-01 RX ADMIN — FUROSEMIDE 20 MG: 10 INJECTION, SOLUTION INTRAMUSCULAR; INTRAVENOUS at 09:07

## 2021-01-01 RX ADMIN — PANTOPRAZOLE SODIUM 40 MG: 40 TABLET, DELAYED RELEASE ORAL at 17:17

## 2021-01-01 RX ADMIN — SODIUM CHLORIDE, PRESERVATIVE FREE 10 ML: 5 INJECTION INTRAVENOUS at 20:09

## 2021-01-01 RX ADMIN — FUROSEMIDE 40 MG: 10 INJECTION, SOLUTION INTRAMUSCULAR; INTRAVENOUS at 13:17

## 2021-01-01 RX ADMIN — INSULIN LISPRO 1 UNITS: 100 INJECTION, SOLUTION INTRAVENOUS; SUBCUTANEOUS at 21:58

## 2021-01-01 RX ADMIN — FERROUS SULFATE TAB 325 MG (65 MG ELEMENTAL FE) 325 MG: 325 (65 FE) TAB at 12:07

## 2021-01-01 RX ADMIN — APIXABAN 5 MG: 5 TABLET, FILM COATED ORAL at 08:08

## 2021-01-01 RX ADMIN — FUROSEMIDE 20 MG: 20 TABLET ORAL at 10:33

## 2021-01-01 RX ADMIN — CEFAZOLIN 1000 MG: 1 INJECTION, POWDER, FOR SOLUTION INTRAMUSCULAR; INTRAVENOUS at 23:18

## 2021-01-01 RX ADMIN — PANTOPRAZOLE SODIUM 40 MG: 40 TABLET, DELAYED RELEASE ORAL at 05:54

## 2021-01-01 RX ADMIN — MICONAZOLE NITRATE: 20 POWDER TOPICAL at 09:20

## 2021-01-01 RX ADMIN — INSULIN LISPRO 1 UNITS: 100 INJECTION, SOLUTION INTRAVENOUS; SUBCUTANEOUS at 20:15

## 2021-01-01 RX ADMIN — INSULIN LISPRO 2 UNITS: 100 INJECTION, SOLUTION INTRAVENOUS; SUBCUTANEOUS at 10:21

## 2021-01-01 RX ADMIN — ASPIRIN 81 MG: 81 TABLET, COATED ORAL at 10:12

## 2021-01-01 RX ADMIN — FLUCONAZOLE 200 MG: 200 INJECTION, SOLUTION INTRAVENOUS at 21:26

## 2021-01-01 RX ADMIN — LORAZEPAM 0.25 MG: 2 INJECTION INTRAMUSCULAR; INTRAVENOUS at 11:31

## 2021-01-01 RX ADMIN — DEXTROSE MONOHYDRATE: 5 INJECTION, SOLUTION INTRAVENOUS at 10:51

## 2021-01-01 RX ADMIN — Medication 10 ML: at 11:53

## 2021-01-01 RX ADMIN — APIXABAN 5 MG: 5 TABLET, FILM COATED ORAL at 21:33

## 2021-01-01 RX ADMIN — INSULIN LISPRO 2 UNITS: 100 INJECTION, SOLUTION INTRAVENOUS; SUBCUTANEOUS at 12:53

## 2021-01-01 RX ADMIN — BUPIVACAINE HYDROCHLORIDE 60 ML: 2.5 INJECTION, SOLUTION EPIDURAL; INFILTRATION; INTRACAUDAL; PERINEURAL at 13:15

## 2021-01-01 RX ADMIN — INSULIN LISPRO 9 UNITS: 100 INJECTION, SOLUTION INTRAVENOUS; SUBCUTANEOUS at 17:32

## 2021-01-01 RX ADMIN — OXYCODONE 5 MG: 5 TABLET ORAL at 01:14

## 2021-01-01 RX ADMIN — METHADONE HYDROCHLORIDE 140 MG: 10 TABLET ORAL at 11:49

## 2021-01-01 RX ADMIN — OXYCODONE 5 MG: 5 TABLET ORAL at 06:37

## 2021-01-01 RX ADMIN — METHADONE HYDROCHLORIDE 140 MG: 10 TABLET ORAL at 10:07

## 2021-01-01 RX ADMIN — GABAPENTIN 100 MG: 100 CAPSULE ORAL at 21:16

## 2021-01-01 RX ADMIN — MEROPENEM 1000 MG: 1 INJECTION, POWDER, FOR SOLUTION INTRAVENOUS at 09:46

## 2021-01-01 RX ADMIN — OXYCODONE 5 MG: 5 TABLET ORAL at 17:31

## 2021-01-01 RX ADMIN — SODIUM CHLORIDE 25 ML: 9 INJECTION, SOLUTION INTRAVENOUS at 23:11

## 2021-01-01 RX ADMIN — MEROPENEM 1000 MG: 1 INJECTION, POWDER, FOR SOLUTION INTRAVENOUS at 17:29

## 2021-01-01 RX ADMIN — INSULIN LISPRO 3 UNITS: 100 INJECTION, SOLUTION INTRAVENOUS; SUBCUTANEOUS at 12:45

## 2021-01-01 RX ADMIN — PROPOFOL 50 MG: 10 INJECTION, EMULSION INTRAVENOUS at 15:14

## 2021-01-01 RX ADMIN — PANTOPRAZOLE SODIUM 40 MG: 40 TABLET, DELAYED RELEASE ORAL at 10:07

## 2021-01-01 RX ADMIN — METOPROLOL SUCCINATE 50 MG: 50 TABLET, EXTENDED RELEASE ORAL at 08:56

## 2021-01-01 RX ADMIN — EPINEPHRINE 1 MG: 0.1 INJECTION, SOLUTION ENDOTRACHEAL; INTRACARDIAC; INTRAVENOUS at 08:24

## 2021-01-01 RX ADMIN — METOPROLOL SUCCINATE 50 MG: 50 TABLET, EXTENDED RELEASE ORAL at 10:07

## 2021-01-01 RX ADMIN — INSULIN GLARGINE 12 UNITS: 100 INJECTION, SOLUTION SUBCUTANEOUS at 00:11

## 2021-01-01 RX ADMIN — DOXAZOSIN 4 MG: 4 TABLET ORAL at 20:29

## 2021-01-01 RX ADMIN — Medication 10 ML: at 21:26

## 2021-01-01 RX ADMIN — SODIUM CHLORIDE 25 ML: 9 INJECTION, SOLUTION INTRAVENOUS at 12:28

## 2021-01-01 RX ADMIN — MEROPENEM 1000 MG: 1 INJECTION, POWDER, FOR SOLUTION INTRAVENOUS at 21:38

## 2021-01-01 RX ADMIN — OXYCODONE 5 MG: 5 TABLET ORAL at 15:54

## 2021-01-01 RX ADMIN — VANCOMYCIN HYDROCHLORIDE 500 MG: 10 INJECTION, POWDER, LYOPHILIZED, FOR SOLUTION INTRAVENOUS at 11:06

## 2021-01-01 RX ADMIN — SODIUM ZIRCONIUM CYCLOSILICATE 10 G: 5 POWDER, FOR SUSPENSION ORAL at 10:42

## 2021-01-01 RX ADMIN — METOPROLOL TARTRATE 25 MG: 25 TABLET, FILM COATED ORAL at 23:05

## 2021-01-01 RX ADMIN — ACETAMINOPHEN 650 MG: 325 TABLET ORAL at 00:29

## 2021-01-01 RX ADMIN — ESCITALOPRAM OXALATE 10 MG: 10 TABLET ORAL at 11:03

## 2021-01-01 RX ADMIN — APIXABAN 5 MG: 2.5 TABLET, FILM COATED ORAL at 10:03

## 2021-01-01 RX ADMIN — SODIUM CHLORIDE, PRESERVATIVE FREE 10 ML: 5 INJECTION INTRAVENOUS at 21:43

## 2021-01-01 RX ADMIN — METOPROLOL SUCCINATE 50 MG: 50 TABLET, EXTENDED RELEASE ORAL at 08:08

## 2021-01-01 RX ADMIN — Medication 10 ML: at 11:54

## 2021-01-01 RX ADMIN — HYDRALAZINE HYDROCHLORIDE 50 MG: 50 TABLET, FILM COATED ORAL at 15:56

## 2021-01-01 RX ADMIN — INSULIN LISPRO 2 UNITS: 100 INJECTION, SOLUTION INTRAVENOUS; SUBCUTANEOUS at 17:46

## 2021-01-01 RX ADMIN — INSULIN LISPRO 1 UNITS: 100 INJECTION, SOLUTION INTRAVENOUS; SUBCUTANEOUS at 10:31

## 2021-01-01 RX ADMIN — Medication 10 ML: at 22:18

## 2021-01-01 RX ADMIN — GABAPENTIN 400 MG: 400 CAPSULE ORAL at 08:36

## 2021-01-01 RX ADMIN — PERFLUTREN 1.65 MG: 6.52 INJECTION, SUSPENSION INTRAVENOUS at 11:04

## 2021-01-01 RX ADMIN — GABAPENTIN 400 MG: 400 CAPSULE ORAL at 09:19

## 2021-01-01 RX ADMIN — INSULIN LISPRO 1 UNITS: 100 INJECTION, SOLUTION INTRAVENOUS; SUBCUTANEOUS at 09:19

## 2021-01-01 RX ADMIN — SODIUM ZIRCONIUM CYCLOSILICATE 10 G: 10 POWDER, FOR SUSPENSION ORAL at 23:21

## 2021-01-01 RX ADMIN — MAGNESIUM SULFATE HEPTAHYDRATE 2000 MG: 2 INJECTION, SOLUTION INTRAVENOUS at 07:49

## 2021-01-01 RX ADMIN — INSULIN LISPRO 6 UNITS: 100 INJECTION, SOLUTION INTRAVENOUS; SUBCUTANEOUS at 16:54

## 2021-01-01 RX ADMIN — ATORVASTATIN CALCIUM 20 MG: 20 TABLET, FILM COATED ORAL at 23:05

## 2021-01-01 RX ADMIN — VANCOMYCIN HYDROCHLORIDE 500 MG: 10 INJECTION, POWDER, LYOPHILIZED, FOR SOLUTION INTRAVENOUS at 09:05

## 2021-01-01 RX ADMIN — INSULIN LISPRO 1 UNITS: 100 INJECTION, SOLUTION INTRAVENOUS; SUBCUTANEOUS at 10:03

## 2021-01-01 RX ADMIN — APIXABAN 5 MG: 2.5 TABLET, FILM COATED ORAL at 21:32

## 2021-01-01 RX ADMIN — APIXABAN 5 MG: 5 TABLET, FILM COATED ORAL at 11:03

## 2021-01-01 RX ADMIN — APIXABAN 5 MG: 5 TABLET, FILM COATED ORAL at 08:21

## 2021-01-01 RX ADMIN — Medication 10 ML: at 09:06

## 2021-01-01 RX ADMIN — MICONAZOLE NITRATE: 20 POWDER TOPICAL at 21:57

## 2021-01-01 RX ADMIN — GABAPENTIN 400 MG: 400 CAPSULE ORAL at 21:37

## 2021-01-01 RX ADMIN — METOPROLOL SUCCINATE 50 MG: 50 TABLET, EXTENDED RELEASE ORAL at 10:37

## 2021-01-01 RX ADMIN — METOPROLOL TARTRATE 25 MG: 25 TABLET, FILM COATED ORAL at 10:18

## 2021-01-01 RX ADMIN — HYDRALAZINE HYDROCHLORIDE 50 MG: 50 TABLET, FILM COATED ORAL at 21:58

## 2021-01-01 RX ADMIN — CLOTRIMAZOLE: 10 CREAM TOPICAL at 09:39

## 2021-01-01 RX ADMIN — INSULIN LISPRO 15 UNITS: 100 INJECTION, SOLUTION INTRAVENOUS; SUBCUTANEOUS at 16:39

## 2021-01-01 RX ADMIN — HEPARIN SODIUM 15 UNITS/KG/HR: 10000 INJECTION, SOLUTION INTRAVENOUS at 02:56

## 2021-01-01 RX ADMIN — SODIUM CHLORIDE 1000 ML: 0.9 INJECTION, SOLUTION INTRAVENOUS at 09:35

## 2021-01-01 RX ADMIN — Medication 10 ML: at 12:03

## 2021-01-01 RX ADMIN — INSULIN LISPRO 3 UNITS: 100 INJECTION, SOLUTION INTRAVENOUS; SUBCUTANEOUS at 17:40

## 2021-01-01 RX ADMIN — VANCOMYCIN HYDROCHLORIDE 750 MG: 10 INJECTION, POWDER, LYOPHILIZED, FOR SOLUTION INTRAVENOUS at 11:31

## 2021-01-01 RX ADMIN — FLUCONAZOLE 200 MG: 200 INJECTION, SOLUTION INTRAVENOUS at 18:37

## 2021-01-01 RX ADMIN — APIXABAN 5 MG: 5 TABLET, FILM COATED ORAL at 09:19

## 2021-01-01 RX ADMIN — PANTOPRAZOLE SODIUM 40 MG: 40 TABLET, DELAYED RELEASE ORAL at 15:30

## 2021-01-01 RX ADMIN — Medication 10 ML: at 08:18

## 2021-01-01 RX ADMIN — METHADONE HYDROCHLORIDE 140 MG: 10 TABLET ORAL at 10:12

## 2021-01-01 RX ADMIN — FUROSEMIDE 20 MG: 10 INJECTION, SOLUTION INTRAMUSCULAR; INTRAVENOUS at 22:16

## 2021-01-01 RX ADMIN — SODIUM CHLORIDE 25 ML: 9 INJECTION, SOLUTION INTRAVENOUS at 05:07

## 2021-01-01 RX ADMIN — ATORVASTATIN CALCIUM 20 MG: 20 TABLET, FILM COATED ORAL at 20:12

## 2021-01-01 RX ADMIN — METOPROLOL TARTRATE 25 MG: 25 TABLET, FILM COATED ORAL at 20:54

## 2021-01-01 RX ADMIN — METOPROLOL SUCCINATE 50 MG: 50 TABLET, EXTENDED RELEASE ORAL at 08:53

## 2021-01-01 RX ADMIN — SODIUM CHLORIDE 1000 ML: 0.9 INJECTION, SOLUTION INTRAVENOUS at 14:30

## 2021-01-01 RX ADMIN — INSULIN LISPRO 2 UNITS: 100 INJECTION, SOLUTION INTRAVENOUS; SUBCUTANEOUS at 21:01

## 2021-01-01 RX ADMIN — HYDROMORPHONE HYDROCHLORIDE 0.25 MG: 1 INJECTION, SOLUTION INTRAMUSCULAR; INTRAVENOUS; SUBCUTANEOUS at 15:34

## 2021-01-01 RX ADMIN — ESCITALOPRAM OXALATE 10 MG: 10 TABLET ORAL at 08:52

## 2021-01-01 RX ADMIN — INSULIN LISPRO 3 UNITS: 100 INJECTION, SOLUTION INTRAVENOUS; SUBCUTANEOUS at 20:13

## 2021-01-01 RX ADMIN — ESCITALOPRAM OXALATE 10 MG: 10 TABLET ORAL at 09:08

## 2021-01-01 RX ADMIN — HYDRALAZINE HYDROCHLORIDE 50 MG: 50 TABLET, FILM COATED ORAL at 06:18

## 2021-01-01 RX ADMIN — OXYCODONE 5 MG: 5 TABLET ORAL at 10:15

## 2021-01-01 RX ADMIN — HYDRALAZINE HYDROCHLORIDE 50 MG: 50 TABLET, FILM COATED ORAL at 21:35

## 2021-01-01 RX ADMIN — VANCOMYCIN HYDROCHLORIDE 750 MG: 10 INJECTION, POWDER, LYOPHILIZED, FOR SOLUTION INTRAVENOUS at 13:40

## 2021-01-01 RX ADMIN — CHLOROTHIAZIDE SODIUM 250 MG: 500 INJECTION, POWDER, LYOPHILIZED, FOR SOLUTION INTRAVENOUS at 00:37

## 2021-01-01 RX ADMIN — VANCOMYCIN HYDROCHLORIDE 500 MG: 10 INJECTION, POWDER, LYOPHILIZED, FOR SOLUTION INTRAVENOUS at 15:11

## 2021-01-01 RX ADMIN — TORSEMIDE 40 MG: 20 TABLET ORAL at 09:10

## 2021-01-01 RX ADMIN — APIXABAN 5 MG: 2.5 TABLET, FILM COATED ORAL at 08:49

## 2021-01-01 RX ADMIN — AMITRIPTYLINE HYDROCHLORIDE 50 MG: 50 TABLET, FILM COATED ORAL at 20:37

## 2021-01-01 RX ADMIN — INSULIN LISPRO 3 UNITS: 100 INJECTION, SOLUTION INTRAVENOUS; SUBCUTANEOUS at 13:34

## 2021-01-01 RX ADMIN — AMITRIPTYLINE HYDROCHLORIDE 100 MG: 50 TABLET, FILM COATED ORAL at 00:20

## 2021-01-01 RX ADMIN — ONDANSETRON 4 MG: 2 INJECTION INTRAMUSCULAR; INTRAVENOUS at 11:20

## 2021-01-01 RX ADMIN — HEPARIN SODIUM 7380 UNITS: 1000 INJECTION INTRAVENOUS; SUBCUTANEOUS at 22:15

## 2021-01-01 RX ADMIN — GABAPENTIN 400 MG: 400 CAPSULE ORAL at 09:58

## 2021-01-01 RX ADMIN — PHENYLEPHRINE HYDROCHLORIDE 80 MCG: 10 INJECTION, SOLUTION INTRAMUSCULAR; INTRAVENOUS; SUBCUTANEOUS at 14:23

## 2021-01-01 RX ADMIN — INSULIN LISPRO 9 UNITS: 100 INJECTION, SOLUTION INTRAVENOUS; SUBCUTANEOUS at 09:21

## 2021-01-01 RX ADMIN — HYDRALAZINE HYDROCHLORIDE 50 MG: 50 TABLET, FILM COATED ORAL at 12:46

## 2021-01-01 RX ADMIN — VANCOMYCIN HYDROCHLORIDE 500 MG: 10 INJECTION, POWDER, LYOPHILIZED, FOR SOLUTION INTRAVENOUS at 06:21

## 2021-01-01 RX ADMIN — ATORVASTATIN CALCIUM 20 MG: 20 TABLET, FILM COATED ORAL at 21:58

## 2021-01-01 RX ADMIN — ACETAMINOPHEN 650 MG: 325 TABLET ORAL at 05:26

## 2021-01-01 RX ADMIN — INSULIN LISPRO 3 UNITS: 100 INJECTION, SOLUTION INTRAVENOUS; SUBCUTANEOUS at 16:45

## 2021-01-01 RX ADMIN — AMITRIPTYLINE HYDROCHLORIDE 100 MG: 50 TABLET, FILM COATED ORAL at 23:05

## 2021-01-01 RX ADMIN — FUROSEMIDE 10 MG/HR: 10 INJECTION, SOLUTION INTRAMUSCULAR; INTRAVENOUS at 13:47

## 2021-01-01 RX ADMIN — AMITRIPTYLINE HYDROCHLORIDE 100 MG: 50 TABLET, FILM COATED ORAL at 21:31

## 2021-01-01 RX ADMIN — AMITRIPTYLINE HYDROCHLORIDE 100 MG: 50 TABLET, FILM COATED ORAL at 20:15

## 2021-01-01 RX ADMIN — APIXABAN 5 MG: 5 TABLET, FILM COATED ORAL at 21:35

## 2021-01-01 RX ADMIN — INSULIN LISPRO 2 UNITS: 100 INJECTION, SOLUTION INTRAVENOUS; SUBCUTANEOUS at 20:33

## 2021-01-01 RX ADMIN — PANTOPRAZOLE SODIUM 40 MG: 40 TABLET, DELAYED RELEASE ORAL at 08:56

## 2021-01-01 RX ADMIN — INSULIN LISPRO 12 UNITS: 100 INJECTION, SOLUTION INTRAVENOUS; SUBCUTANEOUS at 12:27

## 2021-01-01 RX ADMIN — GABAPENTIN 400 MG: 400 CAPSULE ORAL at 08:48

## 2021-01-01 RX ADMIN — INSULIN LISPRO 3 UNITS: 100 INJECTION, SOLUTION INTRAVENOUS; SUBCUTANEOUS at 18:28

## 2021-01-01 RX ADMIN — INSULIN LISPRO 3 UNITS: 100 INJECTION, SOLUTION INTRAVENOUS; SUBCUTANEOUS at 13:12

## 2021-01-01 RX ADMIN — APIXABAN 5 MG: 5 TABLET, FILM COATED ORAL at 09:09

## 2021-01-01 RX ADMIN — INSULIN LISPRO 2 UNITS: 100 INJECTION, SOLUTION INTRAVENOUS; SUBCUTANEOUS at 18:00

## 2021-01-01 RX ADMIN — OXYCODONE 5 MG: 5 TABLET ORAL at 18:30

## 2021-01-01 RX ADMIN — ACETAMINOPHEN 650 MG: 650 SUPPOSITORY RECTAL at 00:52

## 2021-01-01 RX ADMIN — ATORVASTATIN CALCIUM 20 MG: 20 TABLET, FILM COATED ORAL at 23:09

## 2021-01-01 RX ADMIN — Medication 10 ML: at 10:07

## 2021-01-01 RX ADMIN — AMITRIPTYLINE HYDROCHLORIDE 100 MG: 50 TABLET, FILM COATED ORAL at 21:37

## 2021-01-01 RX ADMIN — ONDANSETRON 4 MG: 2 INJECTION INTRAMUSCULAR; INTRAVENOUS at 09:09

## 2021-01-01 RX ADMIN — DEXTROSE MONOHYDRATE 25 G: 25 INJECTION, SOLUTION INTRAVENOUS at 14:32

## 2021-01-01 RX ADMIN — DEXTROSE MONOHYDRATE 25 G: 25 INJECTION, SOLUTION INTRAVENOUS at 18:10

## 2021-01-01 RX ADMIN — CALCIUM GLUCONATE 1000 MG: 98 INJECTION, SOLUTION INTRAVENOUS at 04:28

## 2021-01-01 RX ADMIN — CEFAZOLIN 1000 MG: 1 INJECTION, POWDER, FOR SOLUTION INTRAMUSCULAR; INTRAVENOUS at 22:08

## 2021-01-01 RX ADMIN — ATORVASTATIN CALCIUM 20 MG: 20 TABLET, FILM COATED ORAL at 21:56

## 2021-01-01 RX ADMIN — ESCITALOPRAM OXALATE 10 MG: 10 TABLET ORAL at 09:23

## 2021-01-01 RX ADMIN — GABAPENTIN 400 MG: 400 CAPSULE ORAL at 09:01

## 2021-01-01 RX ADMIN — VANCOMYCIN HYDROCHLORIDE 1000 MG: 10 INJECTION, POWDER, LYOPHILIZED, FOR SOLUTION INTRAVENOUS at 11:25

## 2021-01-01 RX ADMIN — FUROSEMIDE 20 MG: 20 TABLET ORAL at 10:12

## 2021-01-01 RX ADMIN — HYDRALAZINE HYDROCHLORIDE 25 MG: 25 TABLET, FILM COATED ORAL at 21:13

## 2021-01-01 RX ADMIN — MIDAZOLAM HYDROCHLORIDE 2 MG: 2 INJECTION, SOLUTION INTRAMUSCULAR; INTRAVENOUS at 13:10

## 2021-01-01 RX ADMIN — VANCOMYCIN HYDROCHLORIDE 500 MG: 10 INJECTION, POWDER, LYOPHILIZED, FOR SOLUTION INTRAVENOUS at 12:29

## 2021-01-01 RX ADMIN — METOPROLOL TARTRATE 25 MG: 25 TABLET, FILM COATED ORAL at 09:19

## 2021-01-01 RX ADMIN — APIXABAN 5 MG: 5 TABLET, FILM COATED ORAL at 20:32

## 2021-01-01 RX ADMIN — PROPOFOL 50 MG: 10 INJECTION, EMULSION INTRAVENOUS at 13:36

## 2021-01-01 RX ADMIN — PHENYLEPHRINE HYDROCHLORIDE 50 MCG/MIN: 10 INJECTION, SOLUTION INTRAMUSCULAR; INTRAVENOUS; SUBCUTANEOUS at 14:32

## 2021-01-01 RX ADMIN — MEROPENEM 1000 MG: 1 INJECTION, POWDER, FOR SOLUTION INTRAVENOUS at 20:52

## 2021-01-01 RX ADMIN — SODIUM CHLORIDE 25 ML: 9 INJECTION, SOLUTION INTRAVENOUS at 22:12

## 2021-01-01 RX ADMIN — GABAPENTIN 400 MG: 400 CAPSULE ORAL at 09:18

## 2021-01-01 RX ADMIN — ESCITALOPRAM OXALATE 10 MG: 10 TABLET ORAL at 09:18

## 2021-01-01 RX ADMIN — INSULIN GLARGINE 10 UNITS: 100 INJECTION, SOLUTION SUBCUTANEOUS at 20:32

## 2021-01-01 RX ADMIN — SODIUM CHLORIDE 25 ML: 9 INJECTION, SOLUTION INTRAVENOUS at 12:21

## 2021-01-01 RX ADMIN — SODIUM CHLORIDE: 9 INJECTION, SOLUTION INTRAVENOUS at 04:28

## 2021-01-01 RX ADMIN — VANCOMYCIN HYDROCHLORIDE 750 MG: 10 INJECTION, POWDER, LYOPHILIZED, FOR SOLUTION INTRAVENOUS at 09:44

## 2021-01-01 RX ADMIN — OXYCODONE 5 MG: 5 TABLET ORAL at 13:43

## 2021-01-01 RX ADMIN — OXYCODONE 5 MG: 5 TABLET ORAL at 17:44

## 2021-01-01 RX ADMIN — PHENYLEPHRINE HYDROCHLORIDE 40 MCG: 10 INJECTION, SOLUTION INTRAMUSCULAR; INTRAVENOUS; SUBCUTANEOUS at 14:05

## 2021-01-01 RX ADMIN — DEXTROSE MONOHYDRATE 12.5 G: 500 INJECTION PARENTERAL at 10:16

## 2021-01-01 RX ADMIN — MEROPENEM 1000 MG: 1 INJECTION, POWDER, FOR SOLUTION INTRAVENOUS at 17:15

## 2021-01-01 RX ADMIN — MICONAZOLE NITRATE: 20 POWDER TOPICAL at 10:19

## 2021-01-01 RX ADMIN — INSULIN LISPRO 2 UNITS: 100 INJECTION, SOLUTION INTRAVENOUS; SUBCUTANEOUS at 12:47

## 2021-01-01 RX ADMIN — OXYCODONE 5 MG: 5 TABLET ORAL at 20:37

## 2021-01-01 RX ADMIN — MEROPENEM 1000 MG: 1 INJECTION, POWDER, FOR SOLUTION INTRAVENOUS at 00:52

## 2021-01-01 RX ADMIN — ATORVASTATIN CALCIUM 20 MG: 20 TABLET, FILM COATED ORAL at 20:59

## 2021-01-01 RX ADMIN — ASPIRIN 81 MG: 81 TABLET, COATED ORAL at 09:19

## 2021-01-01 RX ADMIN — DEXTROSE MONOHYDRATE 25 G: 25 INJECTION, SOLUTION INTRAVENOUS at 08:43

## 2021-01-01 RX ADMIN — VANCOMYCIN HYDROCHLORIDE 750 MG: 10 INJECTION, POWDER, LYOPHILIZED, FOR SOLUTION INTRAVENOUS at 17:05

## 2021-01-01 RX ADMIN — APIXABAN 5 MG: 5 TABLET, FILM COATED ORAL at 10:20

## 2021-01-01 RX ADMIN — APIXABAN 5 MG: 5 TABLET, FILM COATED ORAL at 21:43

## 2021-01-01 RX ADMIN — MEROPENEM 1000 MG: 1 INJECTION, POWDER, FOR SOLUTION INTRAVENOUS at 21:23

## 2021-01-01 RX ADMIN — SODIUM CHLORIDE, PRESERVATIVE FREE 10 ML: 5 INJECTION INTRAVENOUS at 10:15

## 2021-01-01 RX ADMIN — ESCITALOPRAM OXALATE 10 MG: 10 TABLET ORAL at 09:06

## 2021-01-01 RX ADMIN — SODIUM CHLORIDE 25 ML: 9 INJECTION, SOLUTION INTRAVENOUS at 06:18

## 2021-01-01 RX ADMIN — AMITRIPTYLINE HYDROCHLORIDE 100 MG: 50 TABLET, FILM COATED ORAL at 21:13

## 2021-01-01 RX ADMIN — APIXABAN 5 MG: 2.5 TABLET, FILM COATED ORAL at 08:18

## 2021-01-01 RX ADMIN — AMITRIPTYLINE HYDROCHLORIDE 100 MG: 50 TABLET, FILM COATED ORAL at 21:01

## 2021-01-01 RX ADMIN — MICONAZOLE NITRATE: 20 POWDER TOPICAL at 21:01

## 2021-01-01 RX ADMIN — OXYCODONE 5 MG: 5 TABLET ORAL at 20:39

## 2021-01-01 RX ADMIN — OXYCODONE 5 MG: 5 TABLET ORAL at 13:42

## 2021-01-01 RX ADMIN — TORSEMIDE 40 MG: 20 TABLET ORAL at 09:13

## 2021-01-01 RX ADMIN — ESCITALOPRAM OXALATE 10 MG: 10 TABLET ORAL at 09:01

## 2021-01-01 RX ADMIN — PROPOFOL 100 MG: 10 INJECTION, EMULSION INTRAVENOUS at 11:11

## 2021-01-01 RX ADMIN — SODIUM CHLORIDE, PRESERVATIVE FREE 10 ML: 5 INJECTION INTRAVENOUS at 21:57

## 2021-01-01 RX ADMIN — PANTOPRAZOLE SODIUM 40 MG: 40 TABLET, DELAYED RELEASE ORAL at 06:07

## 2021-01-01 RX ADMIN — HYDRALAZINE HYDROCHLORIDE 50 MG: 50 TABLET, FILM COATED ORAL at 15:41

## 2021-01-01 RX ADMIN — ASPIRIN 81 MG: 81 TABLET, COATED ORAL at 08:35

## 2021-01-01 RX ADMIN — GABAPENTIN 400 MG: 400 CAPSULE ORAL at 09:17

## 2021-01-01 RX ADMIN — MEROPENEM 1000 MG: 1 INJECTION, POWDER, FOR SOLUTION INTRAVENOUS at 05:06

## 2021-01-01 RX ADMIN — OXYCODONE 5 MG: 5 TABLET ORAL at 20:32

## 2021-01-01 RX ADMIN — ATORVASTATIN CALCIUM 20 MG: 20 TABLET, FILM COATED ORAL at 21:26

## 2021-01-01 RX ADMIN — INSULIN LISPRO 4 UNITS: 100 INJECTION, SOLUTION INTRAVENOUS; SUBCUTANEOUS at 11:58

## 2021-01-01 RX ADMIN — MEROPENEM 1000 MG: 1 INJECTION, POWDER, FOR SOLUTION INTRAVENOUS at 17:41

## 2021-01-01 RX ADMIN — INSULIN LISPRO 10 UNITS: 100 INJECTION, SOLUTION INTRAVENOUS; SUBCUTANEOUS at 08:34

## 2021-01-01 RX ADMIN — FUROSEMIDE 40 MG: 10 INJECTION, SOLUTION INTRAMUSCULAR; INTRAVENOUS at 08:52

## 2021-01-01 RX ADMIN — SODIUM CHLORIDE, PRESERVATIVE FREE 10 ML: 5 INJECTION INTRAVENOUS at 21:52

## 2021-01-01 RX ADMIN — GABAPENTIN 400 MG: 400 CAPSULE ORAL at 21:05

## 2021-01-01 RX ADMIN — DEXTROSE AND SODIUM CHLORIDE: 5; 450 INJECTION, SOLUTION INTRAVENOUS at 23:16

## 2021-01-01 RX ADMIN — DOXAZOSIN 4 MG: 4 TABLET ORAL at 21:23

## 2021-01-01 RX ADMIN — ATORVASTATIN CALCIUM 20 MG: 20 TABLET, FILM COATED ORAL at 21:05

## 2021-01-01 RX ADMIN — MEROPENEM 1000 MG: 1 INJECTION, POWDER, FOR SOLUTION INTRAVENOUS at 10:29

## 2021-01-01 RX ADMIN — MEROPENEM 1000 MG: 1 INJECTION, POWDER, FOR SOLUTION INTRAVENOUS at 23:12

## 2021-01-01 RX ADMIN — Medication 10 ML: at 20:30

## 2021-01-01 RX ADMIN — CEFAZOLIN 1000 MG: 1 INJECTION, POWDER, FOR SOLUTION INTRAMUSCULAR; INTRAVENOUS at 10:14

## 2021-01-01 RX ADMIN — GABAPENTIN 400 MG: 400 CAPSULE ORAL at 10:37

## 2021-01-01 RX ADMIN — FUROSEMIDE 60 MG: 10 INJECTION, SOLUTION INTRAMUSCULAR; INTRAVENOUS at 18:24

## 2021-01-01 RX ADMIN — SODIUM CHLORIDE, PRESERVATIVE FREE 10 ML: 5 INJECTION INTRAVENOUS at 21:09

## 2021-01-01 RX ADMIN — FUROSEMIDE 40 MG: 10 INJECTION, SOLUTION INTRAMUSCULAR; INTRAVENOUS at 09:35

## 2021-01-01 RX ADMIN — METHADONE HYDROCHLORIDE 140 MG: 10 TABLET ORAL at 09:00

## 2021-01-01 RX ADMIN — Medication 80 MG: at 10:00

## 2021-01-01 RX ADMIN — MEROPENEM 1000 MG: 1 INJECTION, POWDER, FOR SOLUTION INTRAVENOUS at 10:59

## 2021-01-01 RX ADMIN — FUROSEMIDE 40 MG: 10 INJECTION, SOLUTION INTRAMUSCULAR; INTRAVENOUS at 12:48

## 2021-01-01 RX ADMIN — INSULIN LISPRO 3 UNITS: 100 INJECTION, SOLUTION INTRAVENOUS; SUBCUTANEOUS at 00:11

## 2021-01-01 RX ADMIN — Medication 10 ML: at 11:29

## 2021-01-01 RX ADMIN — METOPROLOL SUCCINATE 50 MG: 50 TABLET, EXTENDED RELEASE ORAL at 09:19

## 2021-01-01 RX ADMIN — SODIUM CHLORIDE 500 ML: 9 INJECTION, SOLUTION INTRAVENOUS at 11:55

## 2021-01-01 RX ADMIN — VANCOMYCIN HYDROCHLORIDE 750 MG: 10 INJECTION, POWDER, LYOPHILIZED, FOR SOLUTION INTRAVENOUS at 12:22

## 2021-01-01 RX ADMIN — SODIUM CHLORIDE: 9 INJECTION, SOLUTION INTRAVENOUS at 11:55

## 2021-01-01 RX ADMIN — ATORVASTATIN CALCIUM 20 MG: 20 TABLET, FILM COATED ORAL at 21:32

## 2021-01-01 RX ADMIN — INSULIN LISPRO 6 UNITS: 100 INJECTION, SOLUTION INTRAVENOUS; SUBCUTANEOUS at 13:01

## 2021-01-01 RX ADMIN — MEROPENEM 1000 MG: 1 INJECTION, POWDER, FOR SOLUTION INTRAVENOUS at 05:02

## 2021-01-01 RX ADMIN — ASPIRIN 81 MG: 81 TABLET, COATED ORAL at 12:23

## 2021-01-01 RX ADMIN — APIXABAN 5 MG: 5 TABLET, FILM COATED ORAL at 10:15

## 2021-01-01 RX ADMIN — SODIUM ZIRCONIUM CYCLOSILICATE 5 G: 5 POWDER, FOR SUSPENSION ORAL at 00:39

## 2021-01-01 RX ADMIN — ROCURONIUM BROMIDE 100 MG: 10 INJECTION INTRAVENOUS at 14:47

## 2021-01-01 RX ADMIN — FUROSEMIDE 40 MG: 10 INJECTION, SOLUTION INTRAMUSCULAR; INTRAVENOUS at 10:52

## 2021-01-01 RX ADMIN — VANCOMYCIN HYDROCHLORIDE 500 MG: 10 INJECTION, POWDER, LYOPHILIZED, FOR SOLUTION INTRAVENOUS at 12:31

## 2021-01-01 RX ADMIN — MEROPENEM 1000 MG: 1 INJECTION, POWDER, FOR SOLUTION INTRAVENOUS at 22:38

## 2021-01-01 RX ADMIN — METOPROLOL SUCCINATE 50 MG: 50 TABLET, EXTENDED RELEASE ORAL at 08:23

## 2021-01-01 RX ADMIN — SODIUM CHLORIDE, PRESERVATIVE FREE 10 ML: 5 INJECTION INTRAVENOUS at 09:17

## 2021-01-01 RX ADMIN — INSULIN GLARGINE 12 UNITS: 100 INJECTION, SOLUTION SUBCUTANEOUS at 21:29

## 2021-01-01 RX ADMIN — SODIUM ZIRCONIUM CYCLOSILICATE 5 G: 5 POWDER, FOR SUSPENSION ORAL at 11:09

## 2021-01-01 RX ADMIN — SODIUM CHLORIDE 25 ML: 9 INJECTION, SOLUTION INTRAVENOUS at 18:05

## 2021-01-01 RX ADMIN — VANCOMYCIN HYDROCHLORIDE 2250 MG: 10 INJECTION, POWDER, LYOPHILIZED, FOR SOLUTION INTRAVENOUS at 12:03

## 2021-01-01 RX ADMIN — Medication 5 ML: at 21:43

## 2021-01-01 RX ADMIN — PANTOPRAZOLE SODIUM 40 MG: 40 TABLET, DELAYED RELEASE ORAL at 09:41

## 2021-01-01 RX ADMIN — ACETAZOLAMIDE SODIUM 500 MG: 500 INJECTION, POWDER, LYOPHILIZED, FOR SOLUTION INTRAVENOUS at 12:17

## 2021-01-01 RX ADMIN — FUROSEMIDE 40 MG: 40 TABLET ORAL at 08:50

## 2021-01-01 RX ADMIN — FUROSEMIDE 40 MG: 40 TABLET ORAL at 09:40

## 2021-01-01 RX ADMIN — INSULIN HUMAN 5 UNITS: 100 INJECTION, SOLUTION PARENTERAL at 08:44

## 2021-01-01 RX ADMIN — HYDRALAZINE HYDROCHLORIDE 25 MG: 25 TABLET, FILM COATED ORAL at 05:05

## 2021-01-01 RX ADMIN — INSULIN LISPRO 3 UNITS: 100 INJECTION, SOLUTION INTRAVENOUS; SUBCUTANEOUS at 10:08

## 2021-01-01 RX ADMIN — SODIUM CHLORIDE, PRESERVATIVE FREE 10 ML: 5 INJECTION INTRAVENOUS at 23:06

## 2021-01-01 RX ADMIN — INSULIN LISPRO 3 UNITS: 100 INJECTION, SOLUTION INTRAVENOUS; SUBCUTANEOUS at 17:35

## 2021-01-01 RX ADMIN — INSULIN LISPRO 5 UNITS: 100 INJECTION, SOLUTION INTRAVENOUS; SUBCUTANEOUS at 20:19

## 2021-01-01 RX ADMIN — HYDRALAZINE HYDROCHLORIDE 50 MG: 50 TABLET, FILM COATED ORAL at 06:57

## 2021-01-01 RX ADMIN — INSULIN LISPRO 3 UNITS: 100 INJECTION, SOLUTION INTRAVENOUS; SUBCUTANEOUS at 08:09

## 2021-01-01 RX ADMIN — INSULIN LISPRO 12 UNITS: 100 INJECTION, SOLUTION INTRAVENOUS; SUBCUTANEOUS at 08:30

## 2021-01-01 RX ADMIN — MEROPENEM 1000 MG: 1 INJECTION, POWDER, FOR SOLUTION INTRAVENOUS at 11:15

## 2021-01-01 RX ADMIN — INSULIN LISPRO 10 UNITS: 100 INJECTION, SOLUTION INTRAVENOUS; SUBCUTANEOUS at 12:31

## 2021-01-01 RX ADMIN — ONDANSETRON 4 MG: 4 TABLET, ORALLY DISINTEGRATING ORAL at 02:39

## 2021-01-01 RX ADMIN — TORSEMIDE 40 MG: 20 TABLET ORAL at 21:49

## 2021-01-01 RX ADMIN — SODIUM ZIRCONIUM CYCLOSILICATE 5 G: 10 POWDER, FOR SUSPENSION ORAL at 22:18

## 2021-01-01 RX ADMIN — SODIUM CHLORIDE: 9 INJECTION, SOLUTION INTRAVENOUS at 02:00

## 2021-01-01 RX ADMIN — VANCOMYCIN HYDROCHLORIDE 500 MG: 10 INJECTION, POWDER, LYOPHILIZED, FOR SOLUTION INTRAVENOUS at 09:14

## 2021-01-01 RX ADMIN — INSULIN LISPRO 6 UNITS: 100 INJECTION, SOLUTION INTRAVENOUS; SUBCUTANEOUS at 16:21

## 2021-01-01 RX ADMIN — PANTOPRAZOLE SODIUM 40 MG: 40 TABLET, DELAYED RELEASE ORAL at 10:52

## 2021-01-01 RX ADMIN — MEROPENEM 1000 MG: 1 INJECTION, POWDER, FOR SOLUTION INTRAVENOUS at 11:05

## 2021-01-01 RX ADMIN — PANTOPRAZOLE SODIUM 40 MG: 40 TABLET, DELAYED RELEASE ORAL at 09:58

## 2021-01-01 RX ADMIN — VANCOMYCIN HYDROCHLORIDE 500 MG: 10 INJECTION, POWDER, LYOPHILIZED, FOR SOLUTION INTRAVENOUS at 05:20

## 2021-01-01 RX ADMIN — PANTOPRAZOLE SODIUM 40 MG: 40 TABLET, DELAYED RELEASE ORAL at 09:23

## 2021-01-01 RX ADMIN — SODIUM CHLORIDE 500 ML: 9 INJECTION, SOLUTION INTRAVENOUS at 18:48

## 2021-01-01 RX ADMIN — PROPOFOL 50 MCG/KG/MIN: 10 INJECTION, EMULSION INTRAVENOUS at 18:38

## 2021-01-01 RX ADMIN — FLUCONAZOLE 200 MG: 200 INJECTION, SOLUTION INTRAVENOUS at 18:59

## 2021-01-01 RX ADMIN — MEROPENEM 1000 MG: 1 INJECTION, POWDER, FOR SOLUTION INTRAVENOUS at 11:41

## 2021-01-01 RX ADMIN — FLUCONAZOLE 200 MG: 200 INJECTION, SOLUTION INTRAVENOUS at 17:11

## 2021-01-01 RX ADMIN — FENTANYL CITRATE 25 MCG/HR: 50 INJECTION, SOLUTION INTRAMUSCULAR; INTRAVENOUS at 17:26

## 2021-01-01 RX ADMIN — MEROPENEM 1000 MG: 1 INJECTION, POWDER, FOR SOLUTION INTRAVENOUS at 17:54

## 2021-01-01 RX ADMIN — GABAPENTIN 400 MG: 400 CAPSULE ORAL at 16:20

## 2021-01-01 RX ADMIN — SODIUM CHLORIDE 25 ML: 9 INJECTION, SOLUTION INTRAVENOUS at 05:19

## 2021-01-01 RX ADMIN — MICONAZOLE NITRATE: 20 POWDER TOPICAL at 21:56

## 2021-01-01 RX ADMIN — MEROPENEM 1000 MG: 1 INJECTION, POWDER, FOR SOLUTION INTRAVENOUS at 20:12

## 2021-01-01 RX ADMIN — INSULIN LISPRO 3 UNITS: 100 INJECTION, SOLUTION INTRAVENOUS; SUBCUTANEOUS at 08:49

## 2021-01-01 RX ADMIN — INSULIN LISPRO 3 UNITS: 100 INJECTION, SOLUTION INTRAVENOUS; SUBCUTANEOUS at 17:42

## 2021-01-01 RX ADMIN — VANCOMYCIN HYDROCHLORIDE 750 MG: 10 INJECTION, POWDER, LYOPHILIZED, FOR SOLUTION INTRAVENOUS at 17:14

## 2021-01-01 RX ADMIN — MEROPENEM 1000 MG: 1 INJECTION, POWDER, FOR SOLUTION INTRAVENOUS at 05:18

## 2021-01-01 RX ADMIN — FUROSEMIDE 60 MG: 10 INJECTION, SOLUTION INTRAMUSCULAR; INTRAVENOUS at 08:09

## 2021-01-01 RX ADMIN — INSULIN LISPRO 1 UNITS: 100 INJECTION, SOLUTION INTRAVENOUS; SUBCUTANEOUS at 21:55

## 2021-01-01 RX ADMIN — INSULIN LISPRO 6 UNITS: 100 INJECTION, SOLUTION INTRAVENOUS; SUBCUTANEOUS at 13:24

## 2021-01-01 RX ADMIN — SODIUM ZIRCONIUM CYCLOSILICATE 5 G: 5 POWDER, FOR SUSPENSION ORAL at 14:52

## 2021-01-01 RX ADMIN — ONDANSETRON 4 MG: 2 INJECTION INTRAMUSCULAR; INTRAVENOUS at 08:24

## 2021-01-01 RX ADMIN — METHADONE HYDROCHLORIDE 120 MG: 10 TABLET ORAL at 08:58

## 2021-01-01 RX ADMIN — MEROPENEM 1000 MG: 1 INJECTION, POWDER, FOR SOLUTION INTRAVENOUS at 21:50

## 2021-01-01 RX ADMIN — ATORVASTATIN CALCIUM 20 MG: 20 TABLET, FILM COATED ORAL at 21:31

## 2021-01-01 RX ADMIN — MEROPENEM 1000 MG: 1 INJECTION, POWDER, FOR SOLUTION INTRAVENOUS at 22:21

## 2021-01-01 RX ADMIN — FENTANYL CITRATE 50 MCG: 50 INJECTION INTRAMUSCULAR; INTRAVENOUS at 18:04

## 2021-01-01 RX ADMIN — VANCOMYCIN HYDROCHLORIDE 750 MG: 10 INJECTION, POWDER, LYOPHILIZED, FOR SOLUTION INTRAVENOUS at 09:21

## 2021-01-01 RX ADMIN — INSULIN LISPRO 2 UNITS: 100 INJECTION, SOLUTION INTRAVENOUS; SUBCUTANEOUS at 21:37

## 2021-01-01 RX ADMIN — PANTOPRAZOLE SODIUM 40 MG: 40 TABLET, DELAYED RELEASE ORAL at 09:18

## 2021-01-01 RX ADMIN — FUROSEMIDE 40 MG: 40 TABLET ORAL at 09:09

## 2021-01-01 RX ADMIN — HEPARIN SODIUM 18 UNITS/KG/HR: 10000 INJECTION, SOLUTION INTRAVENOUS at 21:01

## 2021-01-01 RX ADMIN — METHADONE HYDROCHLORIDE 140 MG: 10 CONCENTRATE ORAL at 11:51

## 2021-01-01 RX ADMIN — FUROSEMIDE 40 MG: 10 INJECTION, SOLUTION INTRAMUSCULAR; INTRAVENOUS at 09:18

## 2021-01-01 RX ADMIN — PHENYLEPHRINE HYDROCHLORIDE 50 MCG: 10 INJECTION, SOLUTION INTRAMUSCULAR; INTRAVENOUS; SUBCUTANEOUS at 12:05

## 2021-01-01 RX ADMIN — Medication 10 ML: at 21:05

## 2021-01-01 RX ADMIN — METOPROLOL SUCCINATE 50 MG: 50 TABLET, EXTENDED RELEASE ORAL at 09:18

## 2021-01-01 RX ADMIN — SODIUM CHLORIDE 25 ML: 9 INJECTION, SOLUTION INTRAVENOUS at 16:34

## 2021-01-01 RX ADMIN — VANCOMYCIN HYDROCHLORIDE 750 MG: 10 INJECTION, POWDER, LYOPHILIZED, FOR SOLUTION INTRAVENOUS at 16:58

## 2021-01-01 RX ADMIN — SODIUM CHLORIDE 25 ML: 9 INJECTION, SOLUTION INTRAVENOUS at 07:48

## 2021-01-01 RX ADMIN — OXYCODONE 5 MG: 5 TABLET ORAL at 20:18

## 2021-01-01 RX ADMIN — ESCITALOPRAM OXALATE 10 MG: 10 TABLET ORAL at 09:40

## 2021-01-01 RX ADMIN — VANCOMYCIN HYDROCHLORIDE 750 MG: 10 INJECTION, POWDER, LYOPHILIZED, FOR SOLUTION INTRAVENOUS at 16:53

## 2021-01-01 RX ADMIN — MEROPENEM 1000 MG: 1 INJECTION, POWDER, FOR SOLUTION INTRAVENOUS at 16:34

## 2021-01-01 RX ADMIN — ASPIRIN 81 MG: 81 TABLET, COATED ORAL at 10:52

## 2021-01-01 RX ADMIN — POLYETHYLENE GLYCOL 3350 17 G: 17 POWDER, FOR SOLUTION ORAL at 13:33

## 2021-01-01 RX ADMIN — PANTOPRAZOLE SODIUM 40 MG: 40 TABLET, DELAYED RELEASE ORAL at 06:36

## 2021-01-01 RX ADMIN — INSULIN LISPRO 1 UNITS: 100 INJECTION, SOLUTION INTRAVENOUS; SUBCUTANEOUS at 21:44

## 2021-01-01 RX ADMIN — SODIUM CHLORIDE 25 ML: 9 INJECTION, SOLUTION INTRAVENOUS at 13:38

## 2021-01-01 RX ADMIN — ASPIRIN 81 MG: 81 TABLET, COATED ORAL at 10:03

## 2021-01-01 RX ADMIN — ASPIRIN 81 MG: 81 TABLET, COATED ORAL at 08:49

## 2021-01-01 RX ADMIN — METOPROLOL SUCCINATE 50 MG: 50 TABLET, EXTENDED RELEASE ORAL at 10:15

## 2021-01-01 RX ADMIN — Medication 0.3 MG: at 14:13

## 2021-01-01 RX ADMIN — MEROPENEM 500 MG: 500 INJECTION, POWDER, FOR SOLUTION INTRAVENOUS at 21:08

## 2021-01-01 RX ADMIN — VANCOMYCIN HYDROCHLORIDE 500 MG: 10 INJECTION, POWDER, LYOPHILIZED, FOR SOLUTION INTRAVENOUS at 05:51

## 2021-01-01 RX ADMIN — SODIUM CHLORIDE 25 ML: 9 INJECTION, SOLUTION INTRAVENOUS at 12:00

## 2021-01-01 RX ADMIN — APIXABAN 5 MG: 5 TABLET, FILM COATED ORAL at 20:54

## 2021-01-01 RX ADMIN — INSULIN LISPRO 4 UNITS: 100 INJECTION, SOLUTION INTRAVENOUS; SUBCUTANEOUS at 18:33

## 2021-01-01 RX ADMIN — FUROSEMIDE 60 MG: 10 INJECTION, SOLUTION INTRAMUSCULAR; INTRAVENOUS at 12:50

## 2021-01-01 RX ADMIN — FUROSEMIDE 40 MG: 40 TABLET ORAL at 17:46

## 2021-01-01 RX ADMIN — APIXABAN 5 MG: 5 TABLET, FILM COATED ORAL at 21:01

## 2021-01-01 RX ADMIN — INSULIN GLARGINE 10 UNITS: 100 INJECTION, SOLUTION SUBCUTANEOUS at 09:19

## 2021-01-01 RX ADMIN — Medication 80 MG: at 10:17

## 2021-01-01 RX ADMIN — DEXTROSE MONOHYDRATE 12.5 G: 25 INJECTION, SOLUTION INTRAVENOUS at 16:20

## 2021-01-01 RX ADMIN — METOPROLOL TARTRATE 25 MG: 25 TABLET, FILM COATED ORAL at 10:20

## 2021-01-01 RX ADMIN — FLUCONAZOLE 200 MG: 200 INJECTION, SOLUTION INTRAVENOUS at 17:19

## 2021-01-01 RX ADMIN — DEXTROSE MONOHYDRATE 25 G: 25 INJECTION, SOLUTION INTRAVENOUS at 09:18

## 2021-01-01 RX ADMIN — OXYCODONE 5 MG: 5 TABLET ORAL at 13:35

## 2021-01-01 RX ADMIN — SODIUM CHLORIDE, PRESERVATIVE FREE 10 ML: 5 INJECTION INTRAVENOUS at 21:03

## 2021-01-01 RX ADMIN — MEROPENEM 1000 MG: 1 INJECTION, POWDER, FOR SOLUTION INTRAVENOUS at 10:13

## 2021-01-01 RX ADMIN — HYDRALAZINE HYDROCHLORIDE 50 MG: 50 TABLET, FILM COATED ORAL at 13:49

## 2021-01-01 RX ADMIN — METHADONE HYDROCHLORIDE 140 MG: 10 TABLET ORAL at 09:36

## 2021-01-01 RX ADMIN — MAGNESIUM SULFATE HEPTAHYDRATE 2000 MG: 2 INJECTION, SOLUTION INTRAVENOUS at 11:42

## 2021-01-01 RX ADMIN — SODIUM CHLORIDE 25 ML: 9 INJECTION, SOLUTION INTRAVENOUS at 09:06

## 2021-01-01 RX ADMIN — GABAPENTIN 200 MG: 100 CAPSULE ORAL at 12:20

## 2021-01-01 RX ADMIN — PANTOPRAZOLE SODIUM 40 MG: 40 TABLET, DELAYED RELEASE ORAL at 08:21

## 2021-01-01 RX ADMIN — FUROSEMIDE 40 MG: 10 INJECTION, SOLUTION INTRAMUSCULAR; INTRAVENOUS at 14:52

## 2021-01-01 RX ADMIN — INSULIN LISPRO 2 UNITS: 100 INJECTION, SOLUTION INTRAVENOUS; SUBCUTANEOUS at 21:50

## 2021-01-01 RX ADMIN — INSULIN LISPRO 2 UNITS: 100 INJECTION, SOLUTION INTRAVENOUS; SUBCUTANEOUS at 21:34

## 2021-01-01 RX ADMIN — ASPIRIN 81 MG: 81 TABLET, COATED ORAL at 09:42

## 2021-01-01 RX ADMIN — SODIUM CHLORIDE, PRESERVATIVE FREE 10 ML: 5 INJECTION INTRAVENOUS at 09:36

## 2021-01-01 RX ADMIN — DEXTROSE MONOHYDRATE 12.5 G: 500 INJECTION PARENTERAL at 13:19

## 2021-01-01 RX ADMIN — PANTOPRAZOLE SODIUM 40 MG: 40 TABLET, DELAYED RELEASE ORAL at 16:38

## 2021-01-01 RX ADMIN — MICONAZOLE NITRATE: 20 POWDER TOPICAL at 08:54

## 2021-01-01 RX ADMIN — ONDANSETRON 4 MG: 2 INJECTION INTRAMUSCULAR; INTRAVENOUS at 23:58

## 2021-01-01 RX ADMIN — INSULIN GLARGINE 10 UNITS: 100 INJECTION, SOLUTION SUBCUTANEOUS at 09:36

## 2021-01-01 RX ADMIN — GABAPENTIN 400 MG: 400 CAPSULE ORAL at 09:41

## 2021-01-01 RX ADMIN — METOPROLOL SUCCINATE 50 MG: 50 TABLET, EXTENDED RELEASE ORAL at 09:01

## 2021-01-01 RX ADMIN — SODIUM CHLORIDE, PRESERVATIVE FREE 10 ML: 5 INJECTION INTRAVENOUS at 08:54

## 2021-01-01 RX ADMIN — METOPROLOL SUCCINATE 100 MG: 100 TABLET, EXTENDED RELEASE ORAL at 21:03

## 2021-01-01 RX ADMIN — AMITRIPTYLINE HYDROCHLORIDE 100 MG: 50 TABLET, FILM COATED ORAL at 20:08

## 2021-01-01 RX ADMIN — SODIUM ZIRCONIUM CYCLOSILICATE 10 G: 10 POWDER, FOR SUSPENSION ORAL at 04:30

## 2021-01-01 RX ADMIN — MAGNESIUM SULFATE HEPTAHYDRATE 2000 MG: 2 INJECTION, SOLUTION INTRAVENOUS at 10:19

## 2021-01-01 RX ADMIN — HEPARIN SODIUM 5000 UNITS: 5000 INJECTION INTRAVENOUS; SUBCUTANEOUS at 13:44

## 2021-01-01 RX ADMIN — ESCITALOPRAM OXALATE 10 MG: 10 TABLET ORAL at 10:37

## 2021-01-01 RX ADMIN — ATORVASTATIN CALCIUM 20 MG: 20 TABLET, FILM COATED ORAL at 22:13

## 2021-01-01 RX ADMIN — ASPIRIN 81 MG: 81 TABLET, COATED ORAL at 10:19

## 2021-01-01 RX ADMIN — METOPROLOL TARTRATE 25 MG: 25 TABLET, FILM COATED ORAL at 08:21

## 2021-01-01 RX ADMIN — HYDRALAZINE HYDROCHLORIDE 50 MG: 50 TABLET, FILM COATED ORAL at 05:11

## 2021-01-01 RX ADMIN — MICONAZOLE NITRATE: 20 POWDER TOPICAL at 20:59

## 2021-01-01 RX ADMIN — INSULIN LISPRO 2 UNITS: 100 INJECTION, SOLUTION INTRAVENOUS; SUBCUTANEOUS at 09:25

## 2021-01-01 RX ADMIN — Medication 80 MG: at 10:24

## 2021-01-01 RX ADMIN — HYDRALAZINE HYDROCHLORIDE 50 MG: 50 TABLET, FILM COATED ORAL at 23:37

## 2021-01-01 RX ADMIN — ESCITALOPRAM OXALATE 10 MG: 10 TABLET ORAL at 08:21

## 2021-01-01 RX ADMIN — INSULIN GLARGINE 10 UNITS: 100 INJECTION, SOLUTION SUBCUTANEOUS at 10:18

## 2021-01-01 RX ADMIN — MEROPENEM 1000 MG: 1 INJECTION, POWDER, FOR SOLUTION INTRAVENOUS at 05:08

## 2021-01-01 RX ADMIN — ASPIRIN 81 MG: 81 TABLET, COATED ORAL at 09:23

## 2021-01-01 RX ADMIN — PANTOPRAZOLE SODIUM 40 MG: 40 TABLET, DELAYED RELEASE ORAL at 10:12

## 2021-01-01 RX ADMIN — METHADONE HYDROCHLORIDE 140 MG: 10 CONCENTRATE ORAL at 08:48

## 2021-01-01 RX ADMIN — FUROSEMIDE 40 MG: 10 INJECTION, SOLUTION INTRAMUSCULAR; INTRAVENOUS at 11:40

## 2021-01-01 RX ADMIN — METOPROLOL SUCCINATE 100 MG: 100 TABLET, EXTENDED RELEASE ORAL at 21:32

## 2021-01-01 RX ADMIN — ESCITALOPRAM OXALATE 10 MG: 10 TABLET ORAL at 09:19

## 2021-01-01 RX ADMIN — FUROSEMIDE 20 MG: 10 INJECTION, SOLUTION INTRAMUSCULAR; INTRAVENOUS at 18:05

## 2021-01-01 RX ADMIN — HEPARIN SODIUM 5000 UNITS: 5000 INJECTION INTRAVENOUS; SUBCUTANEOUS at 21:57

## 2021-01-01 RX ADMIN — METOPROLOL SUCCINATE 50 MG: 50 TABLET, EXTENDED RELEASE ORAL at 09:17

## 2021-01-01 RX ADMIN — LIDOCAINE HYDROCHLORIDE 50 MG: 20 INJECTION, SOLUTION INFILTRATION; PERINEURAL at 11:11

## 2021-01-01 RX ADMIN — HEPARIN SODIUM 22 UNITS/KG/HR: 10000 INJECTION, SOLUTION INTRAVENOUS at 21:30

## 2021-01-01 RX ADMIN — SODIUM CHLORIDE 25 ML: 9 INJECTION, SOLUTION INTRAVENOUS at 11:14

## 2021-01-01 RX ADMIN — INSULIN HUMAN 10 UNITS: 100 INJECTION, SOLUTION PARENTERAL at 09:17

## 2021-01-01 RX ADMIN — MEROPENEM 1000 MG: 1 INJECTION, POWDER, FOR SOLUTION INTRAVENOUS at 17:25

## 2021-01-01 RX ADMIN — GABAPENTIN 400 MG: 400 CAPSULE ORAL at 09:10

## 2021-01-01 RX ADMIN — FENTANYL CITRATE 50 MCG: 50 INJECTION, SOLUTION INTRAMUSCULAR; INTRAVENOUS at 11:33

## 2021-01-01 RX ADMIN — MEROPENEM 1000 MG: 1 INJECTION, POWDER, FOR SOLUTION INTRAVENOUS at 00:04

## 2021-01-01 RX ADMIN — Medication 10 ML: at 21:34

## 2021-01-01 RX ADMIN — PANTOPRAZOLE SODIUM 40 MG: 40 TABLET, DELAYED RELEASE ORAL at 08:36

## 2021-01-01 RX ADMIN — ASPIRIN 81 MG: 81 TABLET, COATED ORAL at 08:48

## 2021-01-01 RX ADMIN — Medication 10 ML: at 21:35

## 2021-01-01 RX ADMIN — DOXAZOSIN 4 MG: 4 TABLET ORAL at 21:13

## 2021-01-01 RX ADMIN — HYDRALAZINE HYDROCHLORIDE 50 MG: 50 TABLET, FILM COATED ORAL at 13:46

## 2021-01-01 RX ADMIN — MEROPENEM 1000 MG: 1 INJECTION, POWDER, FOR SOLUTION INTRAVENOUS at 12:48

## 2021-01-01 RX ADMIN — GABAPENTIN 400 MG: 400 CAPSULE ORAL at 12:23

## 2021-01-01 RX ADMIN — PANTOPRAZOLE SODIUM 40 MG: 40 TABLET, DELAYED RELEASE ORAL at 10:15

## 2021-01-01 RX ADMIN — METHADONE HYDROCHLORIDE 140 MG: 10 TABLET ORAL at 09:07

## 2021-01-01 RX ADMIN — Medication 10 ML: at 10:52

## 2021-01-01 RX ADMIN — MEROPENEM 1000 MG: 1 INJECTION, POWDER, FOR SOLUTION INTRAVENOUS at 10:28

## 2021-01-01 RX ADMIN — HEPARIN SODIUM 5000 UNITS: 5000 INJECTION INTRAVENOUS; SUBCUTANEOUS at 14:01

## 2021-01-01 RX ADMIN — AMITRIPTYLINE HYDROCHLORIDE 100 MG: 50 TABLET, FILM COATED ORAL at 21:56

## 2021-01-01 RX ADMIN — MEROPENEM 1000 MG: 1 INJECTION, POWDER, FOR SOLUTION INTRAVENOUS at 22:36

## 2021-01-01 RX ADMIN — INSULIN LISPRO 3 UNITS: 100 INJECTION, SOLUTION INTRAVENOUS; SUBCUTANEOUS at 09:39

## 2021-01-01 RX ADMIN — INSULIN LISPRO 3 UNITS: 100 INJECTION, SOLUTION INTRAVENOUS; SUBCUTANEOUS at 08:19

## 2021-01-01 RX ADMIN — Medication 10 ML: at 21:52

## 2021-01-01 RX ADMIN — SODIUM CHLORIDE, PRESERVATIVE FREE 10 ML: 5 INJECTION INTRAVENOUS at 09:20

## 2021-01-01 RX ADMIN — APIXABAN 5 MG: 5 TABLET, FILM COATED ORAL at 20:37

## 2021-01-01 RX ADMIN — MICONAZOLE NITRATE: 20 POWDER TOPICAL at 09:42

## 2021-01-01 RX ADMIN — MEROPENEM 1000 MG: 1 INJECTION, POWDER, FOR SOLUTION INTRAVENOUS at 12:23

## 2021-01-01 RX ADMIN — INSULIN LISPRO 6 UNITS: 100 INJECTION, SOLUTION INTRAVENOUS; SUBCUTANEOUS at 21:18

## 2021-01-01 RX ADMIN — HEPARIN SODIUM 18 UNITS/KG/HR: 10000 INJECTION, SOLUTION INTRAVENOUS at 16:11

## 2021-01-01 RX ADMIN — METOPROLOL SUCCINATE 100 MG: 100 TABLET, EXTENDED RELEASE ORAL at 10:27

## 2021-01-01 RX ADMIN — AMITRIPTYLINE HYDROCHLORIDE 100 MG: 50 TABLET, FILM COATED ORAL at 21:05

## 2021-01-01 RX ADMIN — ASPIRIN 81 MG: 81 TABLET, COATED ORAL at 08:59

## 2021-01-01 RX ADMIN — FUROSEMIDE 40 MG: 10 INJECTION, SOLUTION INTRAMUSCULAR; INTRAVENOUS at 17:59

## 2021-01-01 RX ADMIN — ESCITALOPRAM OXALATE 10 MG: 10 TABLET ORAL at 08:49

## 2021-01-01 RX ADMIN — PROPOFOL 30 MCG/KG/MIN: 10 INJECTION, EMULSION INTRAVENOUS at 14:55

## 2021-01-01 RX ADMIN — VANCOMYCIN HYDROCHLORIDE 1500 MG: 10 INJECTION, POWDER, LYOPHILIZED, FOR SOLUTION INTRAVENOUS at 23:50

## 2021-01-01 RX ADMIN — SODIUM ZIRCONIUM CYCLOSILICATE 5 G: 10 POWDER, FOR SUSPENSION ORAL at 21:30

## 2021-01-01 RX ADMIN — INSULIN LISPRO 12 UNITS: 100 INJECTION, SOLUTION INTRAVENOUS; SUBCUTANEOUS at 12:34

## 2021-01-01 RX ADMIN — INSULIN LISPRO 9 UNITS: 100 INJECTION, SOLUTION INTRAVENOUS; SUBCUTANEOUS at 12:14

## 2021-01-01 RX ADMIN — EPINEPHRINE 1 MG: 0.1 INJECTION, SOLUTION ENDOTRACHEAL; INTRACARDIAC; INTRAVENOUS at 08:35

## 2021-01-01 RX ADMIN — TORSEMIDE 40 MG: 20 TABLET ORAL at 11:50

## 2021-01-01 RX ADMIN — Medication 10 ML: at 09:20

## 2021-01-01 RX ADMIN — VANCOMYCIN HYDROCHLORIDE 500 MG: 10 INJECTION, POWDER, LYOPHILIZED, FOR SOLUTION INTRAVENOUS at 14:58

## 2021-01-01 RX ADMIN — INSULIN LISPRO 3 UNITS: 100 INJECTION, SOLUTION INTRAVENOUS; SUBCUTANEOUS at 09:00

## 2021-01-01 RX ADMIN — SODIUM CHLORIDE 25 ML: 9 INJECTION, SOLUTION INTRAVENOUS at 15:24

## 2021-01-01 RX ADMIN — GABAPENTIN 400 MG: 400 CAPSULE ORAL at 20:15

## 2021-01-01 RX ADMIN — ASPIRIN 81 MG: 81 TABLET, COATED ORAL at 09:18

## 2021-01-01 RX ADMIN — INSULIN LISPRO 2 UNITS: 100 INJECTION, SOLUTION INTRAVENOUS; SUBCUTANEOUS at 21:23

## 2021-01-01 RX ADMIN — SODIUM CHLORIDE 25 ML: 9 INJECTION, SOLUTION INTRAVENOUS at 05:01

## 2021-01-01 RX ADMIN — METHADONE HYDROCHLORIDE 100 MG: 10 CONCENTRATE ORAL at 21:38

## 2021-01-01 RX ADMIN — INFLUENZA A VIRUS A/VICTORIA/2570/2019 IVR-215 (H1N1) ANTIGEN (PROPIOLACTONE INACTIVATED), INFLUENZA A VIRUS A/CAMBODIA/E0826360/2020 IVR-224 (H3N2) ANTIGEN (PROPIOLACTONE INACTIVATED), INFLUENZA B VIRUS B/VICTORIA/705/2018 BVR-11 ANTIGEN (PROPIOLACTONE INACTIVATED), INFLUENZA B VIRUS B/PHUKET/3073/2013 BVR-1B ANTIGEN (PROPIOLACTONE INACTIVATED) 0.5 ML: 15; 15; 15; 15 INJECTION, SUSPENSION INTRAMUSCULAR at 17:58

## 2021-01-01 RX ADMIN — SODIUM ZIRCONIUM CYCLOSILICATE 5 G: 10 POWDER, FOR SUSPENSION ORAL at 14:45

## 2021-01-01 RX ADMIN — APIXABAN 5 MG: 5 TABLET, FILM COATED ORAL at 08:18

## 2021-01-01 RX ADMIN — MEROPENEM 500 MG: 500 INJECTION, POWDER, FOR SOLUTION INTRAVENOUS at 05:28

## 2021-01-01 RX ADMIN — ASPIRIN 81 MG: 81 TABLET, COATED ORAL at 09:08

## 2021-01-01 RX ADMIN — PANTOPRAZOLE SODIUM 40 MG: 40 TABLET, DELAYED RELEASE ORAL at 06:33

## 2021-01-01 RX ADMIN — HEPARIN SODIUM 18 UNITS/KG/HR: 10000 INJECTION, SOLUTION INTRAVENOUS at 10:14

## 2021-01-01 RX ADMIN — APIXABAN 5 MG: 5 TABLET, FILM COATED ORAL at 20:28

## 2021-01-01 RX ADMIN — METOPROLOL TARTRATE 25 MG: 25 TABLET, FILM COATED ORAL at 10:13

## 2021-01-01 RX ADMIN — INSULIN GLARGINE 12 UNITS: 100 INJECTION, SOLUTION SUBCUTANEOUS at 22:34

## 2021-01-01 RX ADMIN — AMITRIPTYLINE HYDROCHLORIDE 100 MG: 50 TABLET, FILM COATED ORAL at 21:35

## 2021-01-01 RX ADMIN — ASPIRIN 81 MG: 81 TABLET, COATED ORAL at 11:03

## 2021-01-01 RX ADMIN — SODIUM CHLORIDE, PRESERVATIVE FREE 10 ML: 5 INJECTION INTRAVENOUS at 09:10

## 2021-01-01 RX ADMIN — MEROPENEM 1000 MG: 1 INJECTION, POWDER, FOR SOLUTION INTRAVENOUS at 10:21

## 2021-01-01 RX ADMIN — METHADONE HYDROCHLORIDE 140 MG: 10 TABLET ORAL at 16:39

## 2021-01-01 RX ADMIN — MEROPENEM 500 MG: 500 INJECTION, POWDER, FOR SOLUTION INTRAVENOUS at 18:05

## 2021-01-01 RX ADMIN — Medication 10 ML: at 21:43

## 2021-01-01 RX ADMIN — FUROSEMIDE 60 MG: 10 INJECTION, SOLUTION INTRAMUSCULAR; INTRAVENOUS at 09:18

## 2021-01-01 RX ADMIN — SODIUM CHLORIDE, PRESERVATIVE FREE 10 ML: 5 INJECTION INTRAVENOUS at 08:24

## 2021-01-01 RX ADMIN — MEROPENEM 1000 MG: 1 INJECTION, POWDER, FOR SOLUTION INTRAVENOUS at 22:24

## 2021-01-01 RX ADMIN — VANCOMYCIN HYDROCHLORIDE 500 MG: 10 INJECTION, POWDER, LYOPHILIZED, FOR SOLUTION INTRAVENOUS at 09:12

## 2021-01-01 RX ADMIN — METOPROLOL TARTRATE 25 MG: 25 TABLET, FILM COATED ORAL at 08:48

## 2021-01-01 RX ADMIN — PANTOPRAZOLE SODIUM 40 MG: 40 TABLET, DELAYED RELEASE ORAL at 17:31

## 2021-01-01 RX ADMIN — INSULIN LISPRO 3 UNITS: 100 INJECTION, SOLUTION INTRAVENOUS; SUBCUTANEOUS at 21:50

## 2021-01-01 RX ADMIN — INSULIN LISPRO 1 UNITS: 100 INJECTION, SOLUTION INTRAVENOUS; SUBCUTANEOUS at 17:48

## 2021-01-01 RX ADMIN — DOXAZOSIN 4 MG: 4 TABLET ORAL at 21:32

## 2021-01-01 RX ADMIN — ESCITALOPRAM OXALATE 10 MG: 10 TABLET ORAL at 12:23

## 2021-01-01 RX ADMIN — ESCITALOPRAM OXALATE 10 MG: 10 TABLET ORAL at 16:20

## 2021-01-01 RX ADMIN — MICONAZOLE NITRATE: 20 POWDER TOPICAL at 10:04

## 2021-01-01 RX ADMIN — MEROPENEM 1000 MG: 1 INJECTION, POWDER, FOR SOLUTION INTRAVENOUS at 00:16

## 2021-01-01 RX ADMIN — HEPARIN SODIUM 17 UNITS/KG/HR: 10000 INJECTION, SOLUTION INTRAVENOUS at 08:22

## 2021-01-01 RX ADMIN — OXYCODONE 5 MG: 5 TABLET ORAL at 11:58

## 2021-01-01 RX ADMIN — FUROSEMIDE 40 MG: 10 INJECTION, SOLUTION INTRAMUSCULAR; INTRAVENOUS at 18:55

## 2021-01-01 RX ADMIN — SODIUM CHLORIDE, PRESERVATIVE FREE 10 ML: 5 INJECTION INTRAVENOUS at 21:35

## 2021-01-01 RX ADMIN — SODIUM CHLORIDE 25 ML: 9 INJECTION, SOLUTION INTRAVENOUS at 05:25

## 2021-01-01 RX ADMIN — INSULIN GLARGINE 12 UNITS: 100 INJECTION, SOLUTION SUBCUTANEOUS at 21:17

## 2021-01-01 RX ADMIN — HYDRALAZINE HYDROCHLORIDE 50 MG: 50 TABLET, FILM COATED ORAL at 12:54

## 2021-01-01 RX ADMIN — INSULIN LISPRO 3 UNITS: 100 INJECTION, SOLUTION INTRAVENOUS; SUBCUTANEOUS at 20:59

## 2021-01-01 RX ADMIN — MICONAZOLE NITRATE: 20 POWDER TOPICAL at 12:07

## 2021-01-01 RX ADMIN — OXYCODONE 5 MG: 5 TABLET ORAL at 23:40

## 2021-01-01 RX ADMIN — PROPOFOL 30 MG: 10 INJECTION, EMULSION INTRAVENOUS at 15:16

## 2021-01-01 RX ADMIN — MEROPENEM 1000 MG: 1 INJECTION, POWDER, FOR SOLUTION INTRAVENOUS at 20:49

## 2021-01-01 RX ADMIN — MEROPENEM 1000 MG: 1 INJECTION, POWDER, FOR SOLUTION INTRAVENOUS at 04:41

## 2021-01-01 RX ADMIN — GABAPENTIN 400 MG: 400 CAPSULE ORAL at 21:23

## 2021-01-01 RX ADMIN — ONDANSETRON 4 MG: 2 INJECTION INTRAMUSCULAR; INTRAVENOUS at 14:13

## 2021-01-01 RX ADMIN — FUROSEMIDE 20 MG: 10 INJECTION, SOLUTION INTRAMUSCULAR; INTRAVENOUS at 08:17

## 2021-01-01 RX ADMIN — METOPROLOL TARTRATE 25 MG: 25 TABLET, FILM COATED ORAL at 21:00

## 2021-01-01 RX ADMIN — OXYCODONE 5 MG: 5 TABLET ORAL at 06:17

## 2021-01-01 RX ADMIN — METOPROLOL SUCCINATE 50 MG: 50 TABLET, EXTENDED RELEASE ORAL at 09:41

## 2021-01-01 RX ADMIN — Medication 10 ML: at 21:29

## 2021-01-01 RX ADMIN — Medication 10 ML: at 21:33

## 2021-01-01 RX ADMIN — SODIUM CHLORIDE, PRESERVATIVE FREE 10 ML: 5 INJECTION INTRAVENOUS at 07:42

## 2021-01-01 RX ADMIN — METOPROLOL SUCCINATE 50 MG: 50 TABLET, EXTENDED RELEASE ORAL at 09:21

## 2021-01-01 RX ADMIN — Medication 10 ML: at 21:23

## 2021-01-01 RX ADMIN — METOPROLOL SUCCINATE 50 MG: 50 TABLET, EXTENDED RELEASE ORAL at 08:18

## 2021-01-01 RX ADMIN — ASPIRIN 81 MG: 81 TABLET, COATED ORAL at 09:17

## 2021-01-01 RX ADMIN — PANTOPRAZOLE SODIUM 40 MG: 40 TABLET, DELAYED RELEASE ORAL at 16:58

## 2021-01-01 RX ADMIN — ESCITALOPRAM OXALATE 10 MG: 10 TABLET ORAL at 10:03

## 2021-01-01 RX ADMIN — Medication 10 ML: at 21:59

## 2021-01-01 RX ADMIN — Medication 10 ML: at 10:24

## 2021-01-01 RX ADMIN — PANTOPRAZOLE SODIUM 40 MG: 40 TABLET, DELAYED RELEASE ORAL at 09:01

## 2021-01-01 RX ADMIN — DEXTROSE MONOHYDRATE: 100 INJECTION, SOLUTION INTRAVENOUS at 21:29

## 2021-01-01 RX ADMIN — PIPERACILLIN AND TAZOBACTAM 3375 MG: 3; .375 INJECTION, POWDER, LYOPHILIZED, FOR SOLUTION INTRAVENOUS at 18:17

## 2021-01-01 RX ADMIN — INSULIN LISPRO 9 UNITS: 100 INJECTION, SOLUTION INTRAVENOUS; SUBCUTANEOUS at 13:06

## 2021-01-01 RX ADMIN — INSULIN LISPRO 9 UNITS: 100 INJECTION, SOLUTION INTRAVENOUS; SUBCUTANEOUS at 17:11

## 2021-01-01 RX ADMIN — FUROSEMIDE 60 MG: 10 INJECTION, SOLUTION INTRAMUSCULAR; INTRAVENOUS at 17:41

## 2021-01-01 RX ADMIN — AMITRIPTYLINE HYDROCHLORIDE 50 MG: 50 TABLET, FILM COATED ORAL at 21:17

## 2021-01-01 RX ADMIN — ESCITALOPRAM OXALATE 10 MG: 10 TABLET ORAL at 10:14

## 2021-01-01 RX ADMIN — TORSEMIDE 40 MG: 20 TABLET ORAL at 08:18

## 2021-01-01 RX ADMIN — INSULIN LISPRO 3 UNITS: 100 INJECTION, SOLUTION INTRAVENOUS; SUBCUTANEOUS at 12:55

## 2021-01-01 RX ADMIN — GABAPENTIN 400 MG: 400 CAPSULE ORAL at 08:52

## 2021-01-01 RX ADMIN — SODIUM CHLORIDE, SODIUM LACTATE, POTASSIUM CHLORIDE, CALCIUM CHLORIDE AND DEXTROSE MONOHYDRATE 1000 ML: 5; 600; 310; 30; 20 INJECTION, SOLUTION INTRAVENOUS at 20:27

## 2021-01-01 RX ADMIN — FUROSEMIDE 10 MG: 10 INJECTION, SOLUTION INTRAMUSCULAR; INTRAVENOUS at 17:30

## 2021-01-01 RX ADMIN — INSULIN LISPRO 2 UNITS: 100 INJECTION, SOLUTION INTRAVENOUS; SUBCUTANEOUS at 08:34

## 2021-01-01 RX ADMIN — FUROSEMIDE 60 MG: 10 INJECTION, SOLUTION INTRAMUSCULAR; INTRAVENOUS at 08:14

## 2021-01-01 RX ADMIN — LIDOCAINE HYDROCHLORIDE 100 MG: 20 INJECTION, SOLUTION INFILTRATION; PERINEURAL at 15:14

## 2021-01-01 RX ADMIN — FLUCONAZOLE 200 MG: 200 INJECTION, SOLUTION INTRAVENOUS at 17:21

## 2021-01-01 RX ADMIN — Medication 10 ML: at 10:13

## 2021-01-01 RX ADMIN — FLUCONAZOLE 200 MG: 200 INJECTION, SOLUTION INTRAVENOUS at 18:25

## 2021-01-01 RX ADMIN — FERROUS SULFATE TAB 325 MG (65 MG ELEMENTAL FE) 325 MG: 325 (65 FE) TAB at 09:39

## 2021-01-01 RX ADMIN — PANTOPRAZOLE SODIUM 40 MG: 40 TABLET, DELAYED RELEASE ORAL at 09:06

## 2021-01-01 RX ADMIN — CEFAZOLIN 1000 MG: 1 INJECTION, POWDER, FOR SOLUTION INTRAMUSCULAR; INTRAVENOUS at 23:20

## 2021-01-01 RX ADMIN — ROCURONIUM BROMIDE 25 MG: 10 INJECTION INTRAVENOUS at 11:11

## 2021-01-01 RX ADMIN — Medication 10 ML: at 21:31

## 2021-01-01 RX ADMIN — INSULIN LISPRO 6 UNITS: 100 INJECTION, SOLUTION INTRAVENOUS; SUBCUTANEOUS at 08:33

## 2021-01-01 RX ADMIN — MEROPENEM 1000 MG: 1 INJECTION, POWDER, FOR SOLUTION INTRAVENOUS at 05:25

## 2021-01-01 RX ADMIN — INSULIN LISPRO 3 UNITS: 100 INJECTION, SOLUTION INTRAVENOUS; SUBCUTANEOUS at 11:50

## 2021-01-01 RX ADMIN — EPINEPHRINE 1 MG: 0.1 INJECTION, SOLUTION ENDOTRACHEAL; INTRACARDIAC; INTRAVENOUS at 08:31

## 2021-01-01 RX ADMIN — HYDROMORPHONE HYDROCHLORIDE 0.25 MG: 1 INJECTION, SOLUTION INTRAMUSCULAR; INTRAVENOUS; SUBCUTANEOUS at 19:42

## 2021-01-01 RX ADMIN — SODIUM BICARBONATE 50 MEQ: 84 INJECTION, SOLUTION INTRAVENOUS at 08:25

## 2021-01-01 RX ADMIN — SODIUM CHLORIDE 25 ML: 9 INJECTION, SOLUTION INTRAVENOUS at 17:52

## 2021-01-01 RX ADMIN — GABAPENTIN 400 MG: 400 CAPSULE ORAL at 20:08

## 2021-01-01 RX ADMIN — SODIUM CHLORIDE 25 ML: 9 INJECTION, SOLUTION INTRAVENOUS at 10:26

## 2021-01-01 RX ADMIN — METOPROLOL TARTRATE 25 MG: 25 TABLET, FILM COATED ORAL at 00:20

## 2021-01-01 RX ADMIN — Medication 10 ML: at 08:52

## 2021-01-01 RX ADMIN — NALOXONE HYDROCHLORIDE 0.4 MG: 0.4 INJECTION, SOLUTION INTRAMUSCULAR; INTRAVENOUS; SUBCUTANEOUS at 07:48

## 2021-01-01 RX ADMIN — SODIUM CHLORIDE 25 ML: 9 INJECTION, SOLUTION INTRAVENOUS at 12:22

## 2021-01-01 RX ADMIN — GABAPENTIN 400 MG: 400 CAPSULE ORAL at 21:13

## 2021-01-01 RX ADMIN — PHENYLEPHRINE HYDROCHLORIDE 50 MCG: 10 INJECTION, SOLUTION INTRAMUSCULAR; INTRAVENOUS; SUBCUTANEOUS at 11:56

## 2021-01-01 RX ADMIN — MICONAZOLE NITRATE: 20 POWDER TOPICAL at 21:46

## 2021-01-01 RX ADMIN — GABAPENTIN 400 MG: 400 CAPSULE ORAL at 22:13

## 2021-01-01 RX ADMIN — GABAPENTIN 400 MG: 400 CAPSULE ORAL at 09:24

## 2021-01-01 RX ADMIN — LORAZEPAM 0.5 MG: 2 INJECTION INTRAMUSCULAR; INTRAVENOUS at 18:00

## 2021-01-01 RX ADMIN — INSULIN LISPRO 15 UNITS: 100 INJECTION, SOLUTION INTRAVENOUS; SUBCUTANEOUS at 11:58

## 2021-01-01 RX ADMIN — METOPROLOL TARTRATE 25 MG: 25 TABLET, FILM COATED ORAL at 21:26

## 2021-01-01 RX ADMIN — FUROSEMIDE 40 MG: 10 INJECTION, SOLUTION INTRAMUSCULAR; INTRAVENOUS at 18:15

## 2021-01-01 RX ADMIN — ACETAMINOPHEN 650 MG: 325 TABLET ORAL at 21:34

## 2021-01-01 RX ADMIN — HEPARIN SODIUM 5000 UNITS: 5000 INJECTION INTRAVENOUS; SUBCUTANEOUS at 15:56

## 2021-01-01 RX ADMIN — HYDRALAZINE HYDROCHLORIDE 50 MG: 50 TABLET, FILM COATED ORAL at 21:29

## 2021-01-01 RX ADMIN — INSULIN LISPRO 2 UNITS: 100 INJECTION, SOLUTION INTRAVENOUS; SUBCUTANEOUS at 12:52

## 2021-01-01 RX ADMIN — VANCOMYCIN HYDROCHLORIDE 750 MG: 10 INJECTION, POWDER, LYOPHILIZED, FOR SOLUTION INTRAVENOUS at 13:00

## 2021-01-01 RX ADMIN — DOXAZOSIN 4 MG: 4 TABLET ORAL at 21:03

## 2021-01-01 RX ADMIN — SODIUM CHLORIDE, PRESERVATIVE FREE 10 ML: 5 INJECTION INTRAVENOUS at 09:08

## 2021-01-01 RX ADMIN — INSULIN LISPRO 1 UNITS: 100 INJECTION, SOLUTION INTRAVENOUS; SUBCUTANEOUS at 17:27

## 2021-01-01 RX ADMIN — GABAPENTIN 400 MG: 400 CAPSULE ORAL at 21:33

## 2021-01-01 RX ADMIN — INSULIN LISPRO 5 UNITS: 100 INJECTION, SOLUTION INTRAVENOUS; SUBCUTANEOUS at 17:59

## 2021-01-01 RX ADMIN — AMITRIPTYLINE HYDROCHLORIDE 50 MG: 50 TABLET, FILM COATED ORAL at 20:28

## 2021-01-01 RX ADMIN — GABAPENTIN 400 MG: 400 CAPSULE ORAL at 08:59

## 2021-01-01 RX ADMIN — ASPIRIN 81 MG: 81 TABLET, COATED ORAL at 10:07

## 2021-01-01 RX ADMIN — Medication 0.3 MG: at 14:05

## 2021-01-01 RX ADMIN — HYDROMORPHONE HYDROCHLORIDE 0.25 MG: 1 INJECTION, SOLUTION INTRAMUSCULAR; INTRAVENOUS; SUBCUTANEOUS at 03:44

## 2021-01-01 RX ADMIN — CEFAZOLIN 1000 MG: 1 INJECTION, POWDER, FOR SOLUTION INTRAMUSCULAR; INTRAVENOUS at 09:41

## 2021-01-01 RX ADMIN — APIXABAN 5 MG: 5 TABLET, FILM COATED ORAL at 08:55

## 2021-01-01 RX ADMIN — DOXAZOSIN 4 MG: 4 TABLET ORAL at 09:06

## 2021-01-01 RX ADMIN — Medication 10 ML: at 08:38

## 2021-01-01 RX ADMIN — Medication 10 ML: at 21:53

## 2021-01-01 RX ADMIN — AMITRIPTYLINE HYDROCHLORIDE 50 MG: 50 TABLET, FILM COATED ORAL at 20:54

## 2021-01-01 RX ADMIN — AMITRIPTYLINE HYDROCHLORIDE 50 MG: 50 TABLET, FILM COATED ORAL at 19:43

## 2021-01-01 RX ADMIN — CALCIUM GLUCONATE 1000 MG: 98 INJECTION, SOLUTION INTRAVENOUS at 09:27

## 2021-01-01 RX ADMIN — VANCOMYCIN HYDROCHLORIDE 500 MG: 10 INJECTION, POWDER, LYOPHILIZED, FOR SOLUTION INTRAVENOUS at 07:06

## 2021-01-01 RX ADMIN — SODIUM ZIRCONIUM CYCLOSILICATE 5 G: 10 POWDER, FOR SUSPENSION ORAL at 16:30

## 2021-01-01 RX ADMIN — PROPOFOL 20 MG: 10 INJECTION, EMULSION INTRAVENOUS at 15:15

## 2021-01-01 RX ADMIN — APIXABAN 5 MG: 5 TABLET, FILM COATED ORAL at 20:18

## 2021-01-01 RX ADMIN — INSULIN LISPRO 6 UNITS: 100 INJECTION, SOLUTION INTRAVENOUS; SUBCUTANEOUS at 12:33

## 2021-01-01 RX ADMIN — FUROSEMIDE 20 MG: 10 INJECTION, SOLUTION INTRAMUSCULAR; INTRAVENOUS at 23:31

## 2021-01-01 RX ADMIN — GABAPENTIN 400 MG: 400 CAPSULE ORAL at 00:19

## 2021-01-01 RX ADMIN — FUROSEMIDE 40 MG: 40 TABLET ORAL at 08:48

## 2021-01-01 RX ADMIN — DOXAZOSIN 4 MG: 4 TABLET ORAL at 23:09

## 2021-01-01 RX ADMIN — NALOXONE HYDROCHLORIDE 4 MG: 1 INJECTION PARENTERAL at 14:29

## 2021-01-01 RX ADMIN — HYDRALAZINE HYDROCHLORIDE 50 MG: 50 TABLET, FILM COATED ORAL at 15:05

## 2021-01-01 RX ADMIN — FUROSEMIDE 60 MG: 10 INJECTION, SOLUTION INTRAMUSCULAR; INTRAVENOUS at 10:15

## 2021-01-01 RX ADMIN — PROMETHAZINE HYDROCHLORIDE 25 MG: 25 TABLET ORAL at 00:13

## 2021-01-01 RX ADMIN — GABAPENTIN 400 MG: 400 CAPSULE ORAL at 09:08

## 2021-01-01 RX ADMIN — INSULIN LISPRO 3 UNITS: 100 INJECTION, SOLUTION INTRAVENOUS; SUBCUTANEOUS at 10:14

## 2021-01-01 RX ADMIN — EPHEDRINE SULFATE 10 MG: 50 INJECTION INTRAVENOUS at 14:24

## 2021-01-01 RX ADMIN — APIXABAN 5 MG: 5 TABLET, FILM COATED ORAL at 21:45

## 2021-01-01 RX ADMIN — VANCOMYCIN HYDROCHLORIDE 500 MG: 10 INJECTION, POWDER, LYOPHILIZED, FOR SOLUTION INTRAVENOUS at 08:57

## 2021-01-01 RX ADMIN — FENTANYL CITRATE 100 MCG: 50 INJECTION, SOLUTION INTRAMUSCULAR; INTRAVENOUS at 13:10

## 2021-01-01 RX ADMIN — SODIUM CHLORIDE 1000 ML: 9 INJECTION, SOLUTION INTRAVENOUS at 14:51

## 2021-01-01 RX ADMIN — METHADONE HYDROCHLORIDE 140 MG: 10 TABLET ORAL at 09:41

## 2021-01-01 RX ADMIN — ASPIRIN 81 MG: 81 TABLET, COATED ORAL at 09:09

## 2021-01-01 RX ADMIN — MIDAZOLAM 2 MG: 1 INJECTION INTRAMUSCULAR; INTRAVENOUS at 12:37

## 2021-01-01 RX ADMIN — PHENYLEPHRINE HYDROCHLORIDE 50 MCG: 10 INJECTION, SOLUTION INTRAMUSCULAR; INTRAVENOUS; SUBCUTANEOUS at 12:31

## 2021-01-01 RX ADMIN — FUROSEMIDE 40 MG: 10 INJECTION, SOLUTION INTRAMUSCULAR; INTRAVENOUS at 20:54

## 2021-01-01 RX ADMIN — PANTOPRAZOLE SODIUM 40 MG: 40 TABLET, DELAYED RELEASE ORAL at 06:01

## 2021-01-01 RX ADMIN — HYDRALAZINE HYDROCHLORIDE 50 MG: 50 TABLET, FILM COATED ORAL at 06:36

## 2021-01-01 RX ADMIN — MEROPENEM 1000 MG: 1 INJECTION, POWDER, FOR SOLUTION INTRAVENOUS at 18:06

## 2021-01-01 RX ADMIN — ONDANSETRON 4 MG: 2 INJECTION INTRAMUSCULAR; INTRAVENOUS at 17:59

## 2021-01-01 RX ADMIN — INSULIN LISPRO 5 UNITS: 100 INJECTION, SOLUTION INTRAVENOUS; SUBCUTANEOUS at 20:38

## 2021-01-01 RX ADMIN — OXYCODONE 5 MG: 5 TABLET ORAL at 17:05

## 2021-01-01 RX ADMIN — INSULIN LISPRO 3 UNITS: 100 INJECTION, SOLUTION INTRAVENOUS; SUBCUTANEOUS at 21:09

## 2021-01-01 RX ADMIN — ROCURONIUM BROMIDE 50 MG: 10 INJECTION INTRAVENOUS at 13:35

## 2021-01-01 RX ADMIN — APIXABAN 5 MG: 5 TABLET, FILM COATED ORAL at 08:48

## 2021-01-01 RX ADMIN — OXYCODONE 5 MG: 5 TABLET ORAL at 16:29

## 2021-01-01 RX ADMIN — SODIUM CHLORIDE 25 ML: 9 INJECTION, SOLUTION INTRAVENOUS at 08:17

## 2021-01-01 RX ADMIN — VANCOMYCIN HYDROCHLORIDE 750 MG: 10 INJECTION, POWDER, LYOPHILIZED, FOR SOLUTION INTRAVENOUS at 15:25

## 2021-01-01 RX ADMIN — PANTOPRAZOLE SODIUM 40 MG: 40 TABLET, DELAYED RELEASE ORAL at 06:03

## 2021-01-01 RX ADMIN — SODIUM CHLORIDE 25 ML: 9 INJECTION, SOLUTION INTRAVENOUS at 06:09

## 2021-01-01 RX ADMIN — ESCITALOPRAM OXALATE 10 MG: 10 TABLET ORAL at 08:48

## 2021-01-01 RX ADMIN — FUROSEMIDE 20 MG: 20 TABLET ORAL at 09:06

## 2021-01-01 RX ADMIN — MEROPENEM 500 MG: 500 INJECTION, POWDER, FOR SOLUTION INTRAVENOUS at 06:12

## 2021-01-01 RX ADMIN — INSULIN LISPRO 6 UNITS: 100 INJECTION, SOLUTION INTRAVENOUS; SUBCUTANEOUS at 21:01

## 2021-01-01 RX ADMIN — FLUCONAZOLE 200 MG: 200 INJECTION, SOLUTION INTRAVENOUS at 18:21

## 2021-01-01 RX ADMIN — FENTANYL CITRATE 50 MCG: 50 INJECTION, SOLUTION INTRAMUSCULAR; INTRAVENOUS at 11:11

## 2021-01-01 RX ADMIN — SODIUM CHLORIDE 25 ML: 9 INJECTION, SOLUTION INTRAVENOUS at 12:47

## 2021-01-01 RX ADMIN — ASPIRIN 81 MG: 81 TABLET, COATED ORAL at 10:13

## 2021-01-01 RX ADMIN — PANTOPRAZOLE SODIUM 40 MG: 40 TABLET, DELAYED RELEASE ORAL at 08:52

## 2021-01-01 RX ADMIN — VANCOMYCIN HYDROCHLORIDE 750 MG: 10 INJECTION, POWDER, LYOPHILIZED, FOR SOLUTION INTRAVENOUS at 16:22

## 2021-01-01 RX ADMIN — MEROPENEM 500 MG: 500 INJECTION, POWDER, FOR SOLUTION INTRAVENOUS at 18:03

## 2021-01-01 RX ADMIN — INSULIN LISPRO 12 UNITS: 100 INJECTION, SOLUTION INTRAVENOUS; SUBCUTANEOUS at 12:05

## 2021-01-01 RX ADMIN — MEROPENEM 1000 MG: 1 INJECTION, POWDER, FOR SOLUTION INTRAVENOUS at 11:58

## 2021-01-01 RX ADMIN — TORSEMIDE 40 MG: 20 TABLET ORAL at 09:19

## 2021-01-01 RX ADMIN — HYDRALAZINE HYDROCHLORIDE 5 MG: 20 INJECTION INTRAMUSCULAR; INTRAVENOUS at 13:43

## 2021-01-01 RX ADMIN — PANTOPRAZOLE SODIUM 40 MG: 40 TABLET, DELAYED RELEASE ORAL at 09:08

## 2021-01-01 RX ADMIN — INSULIN GLARGINE 12 UNITS: 100 INJECTION, SOLUTION SUBCUTANEOUS at 21:38

## 2021-01-01 RX ADMIN — VANCOMYCIN HYDROCHLORIDE 500 MG: 10 INJECTION, POWDER, LYOPHILIZED, FOR SOLUTION INTRAVENOUS at 08:22

## 2021-01-01 RX ADMIN — MEROPENEM 1000 MG: 1 INJECTION, POWDER, FOR SOLUTION INTRAVENOUS at 22:23

## 2021-01-01 RX ADMIN — HEPARIN SODIUM 17 UNITS/KG/HR: 10000 INJECTION, SOLUTION INTRAVENOUS at 00:54

## 2021-01-01 RX ADMIN — AMITRIPTYLINE HYDROCHLORIDE 100 MG: 50 TABLET, FILM COATED ORAL at 21:58

## 2021-01-01 RX ADMIN — GABAPENTIN 400 MG: 400 CAPSULE ORAL at 21:29

## 2021-01-01 RX ADMIN — OXYCODONE 5 MG: 5 TABLET ORAL at 16:00

## 2021-01-01 RX ADMIN — APIXABAN 5 MG: 5 TABLET, FILM COATED ORAL at 09:17

## 2021-01-01 RX ADMIN — PANTOPRAZOLE SODIUM 40 MG: 40 TABLET, DELAYED RELEASE ORAL at 09:09

## 2021-01-01 RX ADMIN — ALBUMIN (HUMAN) 12.5 G: 12.5 INJECTION, SOLUTION INTRAVENOUS at 14:45

## 2021-01-01 RX ADMIN — ATORVASTATIN CALCIUM 20 MG: 20 TABLET, FILM COATED ORAL at 20:15

## 2021-01-01 RX ADMIN — APIXABAN 5 MG: 5 TABLET, FILM COATED ORAL at 20:08

## 2021-01-01 RX ADMIN — INSULIN LISPRO 2 UNITS: 100 INJECTION, SOLUTION INTRAVENOUS; SUBCUTANEOUS at 13:02

## 2021-01-01 RX ADMIN — OXYCODONE 5 MG: 5 TABLET ORAL at 08:18

## 2021-01-01 RX ADMIN — SODIUM CHLORIDE 25 ML: 9 INJECTION, SOLUTION INTRAVENOUS at 10:55

## 2021-01-01 RX ADMIN — SODIUM CHLORIDE, PRESERVATIVE FREE 10 ML: 5 INJECTION INTRAVENOUS at 21:08

## 2021-01-01 RX ADMIN — HYDRALAZINE HYDROCHLORIDE 50 MG: 50 TABLET, FILM COATED ORAL at 07:38

## 2021-01-01 RX ADMIN — HYDROXYZINE PAMOATE 50 MG: 25 CAPSULE ORAL at 17:40

## 2021-01-01 RX ADMIN — GABAPENTIN 400 MG: 400 CAPSULE ORAL at 21:56

## 2021-01-01 RX ADMIN — ASPIRIN 81 MG: 81 TABLET, COATED ORAL at 09:40

## 2021-01-01 RX ADMIN — Medication 70 MG: at 12:53

## 2021-01-01 RX ADMIN — GABAPENTIN 400 MG: 400 CAPSULE ORAL at 21:31

## 2021-01-01 RX ADMIN — OXYCODONE 5 MG: 5 TABLET ORAL at 16:57

## 2021-01-01 RX ADMIN — PANTOPRAZOLE SODIUM 40 MG: 40 TABLET, DELAYED RELEASE ORAL at 05:28

## 2021-01-01 RX ADMIN — INSULIN LISPRO 9 UNITS: 100 INJECTION, SOLUTION INTRAVENOUS; SUBCUTANEOUS at 08:24

## 2021-01-01 RX ADMIN — Medication 10 ML: at 23:09

## 2021-01-01 RX ADMIN — ASPIRIN 81 MG: 81 TABLET, COATED ORAL at 09:05

## 2021-01-01 RX ADMIN — PANTOPRAZOLE SODIUM 40 MG: 40 TABLET, DELAYED RELEASE ORAL at 09:19

## 2021-01-01 RX ADMIN — ATORVASTATIN CALCIUM 20 MG: 20 TABLET, FILM COATED ORAL at 21:00

## 2021-01-01 RX ADMIN — HYDRALAZINE HYDROCHLORIDE 50 MG: 50 TABLET, FILM COATED ORAL at 21:37

## 2021-01-01 RX ADMIN — MEROPENEM 1000 MG: 1 INJECTION, POWDER, FOR SOLUTION INTRAVENOUS at 17:06

## 2021-01-01 RX ADMIN — FLUCONAZOLE 200 MG: 200 INJECTION, SOLUTION INTRAVENOUS at 17:00

## 2021-01-01 RX ADMIN — Medication 10 ML: at 10:37

## 2021-01-01 RX ADMIN — MICONAZOLE NITRATE: 20 POWDER TOPICAL at 08:50

## 2021-01-01 RX ADMIN — ATORVASTATIN CALCIUM 20 MG: 20 TABLET, FILM COATED ORAL at 21:35

## 2021-01-01 RX ADMIN — MICONAZOLE NITRATE: 20 POWDER TOPICAL at 21:10

## 2021-01-01 RX ADMIN — MEROPENEM 1000 MG: 1 INJECTION, POWDER, FOR SOLUTION INTRAVENOUS at 11:18

## 2021-01-01 RX ADMIN — SODIUM CHLORIDE 25 ML: 9 INJECTION, SOLUTION INTRAVENOUS at 09:30

## 2021-01-01 RX ADMIN — SODIUM ZIRCONIUM CYCLOSILICATE 10 G: 10 POWDER, FOR SUSPENSION ORAL at 12:45

## 2021-01-01 RX ADMIN — MEROPENEM 1000 MG: 1 INJECTION, POWDER, FOR SOLUTION INTRAVENOUS at 08:49

## 2021-01-01 RX ADMIN — FUROSEMIDE 40 MG: 10 INJECTION, SOLUTION INTRAMUSCULAR; INTRAVENOUS at 18:05

## 2021-01-01 RX ADMIN — SODIUM CHLORIDE 25 ML: 9 INJECTION, SOLUTION INTRAVENOUS at 09:07

## 2021-01-01 RX ADMIN — MICONAZOLE NITRATE: 20 POWDER TOPICAL at 09:59

## 2021-01-01 RX ADMIN — APIXABAN 5 MG: 5 TABLET, FILM COATED ORAL at 12:57

## 2021-01-01 RX ADMIN — MEROPENEM 1000 MG: 1 INJECTION, POWDER, FOR SOLUTION INTRAVENOUS at 23:06

## 2021-01-01 RX ADMIN — DOXAZOSIN 4 MG: 4 TABLET ORAL at 09:40

## 2021-01-01 RX ADMIN — LABETALOL HYDROCHLORIDE 10 MG: 5 INJECTION, SOLUTION INTRAVENOUS at 21:46

## 2021-01-01 RX ADMIN — APIXABAN 5 MG: 5 TABLET, FILM COATED ORAL at 09:08

## 2021-01-01 RX ADMIN — INSULIN LISPRO 9 UNITS: 100 INJECTION, SOLUTION INTRAVENOUS; SUBCUTANEOUS at 12:24

## 2021-01-01 RX ADMIN — HYDRALAZINE HYDROCHLORIDE 5 MG: 20 INJECTION INTRAMUSCULAR; INTRAVENOUS at 10:47

## 2021-01-01 RX ADMIN — AMITRIPTYLINE HYDROCHLORIDE 100 MG: 50 TABLET, FILM COATED ORAL at 23:37

## 2021-01-01 RX ADMIN — INSULIN LISPRO 3 UNITS: 100 INJECTION, SOLUTION INTRAVENOUS; SUBCUTANEOUS at 08:23

## 2021-01-01 RX ADMIN — SODIUM CHLORIDE 1000 ML: 9 INJECTION, SOLUTION INTRAVENOUS at 13:18

## 2021-01-01 RX ADMIN — VANCOMYCIN HYDROCHLORIDE 500 MG: 10 INJECTION, POWDER, LYOPHILIZED, FOR SOLUTION INTRAVENOUS at 16:40

## 2021-01-01 RX ADMIN — INSULIN LISPRO 3 UNITS: 100 INJECTION, SOLUTION INTRAVENOUS; SUBCUTANEOUS at 10:15

## 2021-01-01 RX ADMIN — GABAPENTIN 400 MG: 400 CAPSULE ORAL at 10:12

## 2021-01-01 RX ADMIN — SODIUM CHLORIDE, PRESERVATIVE FREE 10 ML: 5 INJECTION INTRAVENOUS at 00:21

## 2021-01-01 RX ADMIN — MEROPENEM 1000 MG: 1 INJECTION, POWDER, FOR SOLUTION INTRAVENOUS at 10:54

## 2021-01-01 RX ADMIN — ESCITALOPRAM OXALATE 10 MG: 10 TABLET ORAL at 09:58

## 2021-01-01 RX ADMIN — AMITRIPTYLINE HYDROCHLORIDE 100 MG: 50 TABLET, FILM COATED ORAL at 22:13

## 2021-01-01 RX ADMIN — PROMETHAZINE HYDROCHLORIDE 25 MG: 25 TABLET ORAL at 00:00

## 2021-01-01 RX ADMIN — METOPROLOL SUCCINATE 50 MG: 50 TABLET, EXTENDED RELEASE ORAL at 09:24

## 2021-01-01 RX ADMIN — METOPROLOL SUCCINATE 50 MG: 50 TABLET, EXTENDED RELEASE ORAL at 09:10

## 2021-01-01 RX ADMIN — Medication 10 ML: at 09:31

## 2021-01-01 RX ADMIN — INSULIN LISPRO 6 UNITS: 100 INJECTION, SOLUTION INTRAVENOUS; SUBCUTANEOUS at 17:27

## 2021-01-01 RX ADMIN — CHLOROTHIAZIDE SODIUM 250 MG: 500 INJECTION, POWDER, LYOPHILIZED, FOR SOLUTION INTRAVENOUS at 11:03

## 2021-01-01 RX ADMIN — APIXABAN 5 MG: 5 TABLET, FILM COATED ORAL at 09:13

## 2021-01-01 RX ADMIN — APIXABAN 5 MG: 5 TABLET, FILM COATED ORAL at 23:05

## 2021-01-01 RX ADMIN — SODIUM CHLORIDE, PRESERVATIVE FREE 10 ML: 5 INJECTION INTRAVENOUS at 08:59

## 2021-01-01 RX ADMIN — FUROSEMIDE 60 MG: 10 INJECTION, SOLUTION INTRAMUSCULAR; INTRAVENOUS at 11:30

## 2021-01-01 RX ADMIN — SODIUM CHLORIDE: 9 INJECTION, SOLUTION INTRAVENOUS at 21:27

## 2021-01-01 RX ADMIN — HYDRALAZINE HYDROCHLORIDE 50 MG: 50 TABLET, FILM COATED ORAL at 14:00

## 2021-01-01 RX ADMIN — ONDANSETRON 4 MG: 2 INJECTION INTRAMUSCULAR; INTRAVENOUS at 22:24

## 2021-01-01 RX ADMIN — ONDANSETRON 4 MG: 2 INJECTION INTRAMUSCULAR; INTRAVENOUS at 07:42

## 2021-01-01 RX ADMIN — APIXABAN 5 MG: 5 TABLET, FILM COATED ORAL at 19:43

## 2021-01-01 RX ADMIN — MICONAZOLE NITRATE: 20 POWDER TOPICAL at 11:07

## 2021-01-01 RX ADMIN — ACETAMINOPHEN 650 MG: 325 TABLET ORAL at 17:40

## 2021-01-01 RX ADMIN — VANCOMYCIN HYDROCHLORIDE 500 MG: 10 INJECTION, POWDER, LYOPHILIZED, FOR SOLUTION INTRAVENOUS at 09:23

## 2021-01-01 RX ADMIN — MEROPENEM 1000 MG: 1 INJECTION, POWDER, FOR SOLUTION INTRAVENOUS at 18:15

## 2021-01-01 RX ADMIN — PANTOPRAZOLE SODIUM 40 MG: 40 TABLET, DELAYED RELEASE ORAL at 10:37

## 2021-01-01 RX ADMIN — Medication 10 ML: at 00:14

## 2021-01-01 RX ADMIN — SODIUM CHLORIDE, PRESERVATIVE FREE 10 ML: 5 INJECTION INTRAVENOUS at 09:21

## 2021-01-01 RX ADMIN — MEROPENEM 1000 MG: 1 INJECTION, POWDER, FOR SOLUTION INTRAVENOUS at 22:13

## 2021-01-01 RX ADMIN — SODIUM CHLORIDE 1000 ML: 9 INJECTION, SOLUTION INTRAVENOUS at 04:39

## 2021-01-01 RX ADMIN — MEROPENEM 1000 MG: 1 INJECTION, POWDER, FOR SOLUTION INTRAVENOUS at 05:22

## 2021-01-01 RX ADMIN — MICONAZOLE NITRATE: 20 POWDER TOPICAL at 20:09

## 2021-01-01 RX ADMIN — AMMONIUM LACTATE: 12 LOTION TOPICAL at 10:33

## 2021-01-01 RX ADMIN — ESCITALOPRAM OXALATE 10 MG: 10 TABLET ORAL at 08:18

## 2021-01-01 RX ADMIN — HEPARIN SODIUM 8050 UNITS: 1000 INJECTION INTRAVENOUS; SUBCUTANEOUS at 21:06

## 2021-01-01 RX ADMIN — PANTOPRAZOLE SODIUM 40 MG: 40 TABLET, DELAYED RELEASE ORAL at 16:21

## 2021-01-01 RX ADMIN — ATORVASTATIN CALCIUM 20 MG: 20 TABLET, FILM COATED ORAL at 21:24

## 2021-01-01 RX ADMIN — OXYCODONE 5 MG: 5 TABLET ORAL at 05:11

## 2021-01-01 RX ADMIN — SODIUM CHLORIDE, PRESERVATIVE FREE 10 ML: 5 INJECTION INTRAVENOUS at 20:32

## 2021-01-01 RX ADMIN — HEPARIN SODIUM 5000 UNITS: 5000 INJECTION INTRAVENOUS; SUBCUTANEOUS at 06:18

## 2021-01-01 RX ADMIN — HEPARIN SODIUM 5000 UNITS: 5000 INJECTION INTRAVENOUS; SUBCUTANEOUS at 13:20

## 2021-01-01 RX ADMIN — METHADONE HYDROCHLORIDE 140 MG: 10 TABLET ORAL at 08:35

## 2021-01-01 RX ADMIN — ESCITALOPRAM OXALATE 10 MG: 10 TABLET ORAL at 10:20

## 2021-01-01 RX ADMIN — INSULIN LISPRO 2 UNITS: 100 INJECTION, SOLUTION INTRAVENOUS; SUBCUTANEOUS at 09:43

## 2021-01-01 RX ADMIN — INSULIN LISPRO 2 UNITS: 100 INJECTION, SOLUTION INTRAVENOUS; SUBCUTANEOUS at 23:19

## 2021-01-01 RX ADMIN — INSULIN LISPRO 6 UNITS: 100 INJECTION, SOLUTION INTRAVENOUS; SUBCUTANEOUS at 17:21

## 2021-01-01 RX ADMIN — ALBUTEROL SULFATE 2 PUFF: 90 AEROSOL, METERED RESPIRATORY (INHALATION) at 11:20

## 2021-01-01 RX ADMIN — SODIUM CHLORIDE, PRESERVATIVE FREE 10 ML: 5 INJECTION INTRAVENOUS at 09:09

## 2021-01-01 RX ADMIN — PHENYLEPHRINE HYDROCHLORIDE 50 MCG: 10 INJECTION, SOLUTION INTRAMUSCULAR; INTRAVENOUS; SUBCUTANEOUS at 12:13

## 2021-01-01 RX ADMIN — METHADONE HYDROCHLORIDE 140 MG: 10 TABLET ORAL at 12:56

## 2021-01-01 RX ADMIN — VANCOMYCIN HYDROCHLORIDE 500 MG: 10 INJECTION, POWDER, LYOPHILIZED, FOR SOLUTION INTRAVENOUS at 09:39

## 2021-01-01 RX ADMIN — SODIUM CHLORIDE, PRESERVATIVE FREE 10 ML: 5 INJECTION INTRAVENOUS at 21:01

## 2021-01-01 RX ADMIN — INSULIN LISPRO 6 UNITS: 100 INJECTION, SOLUTION INTRAVENOUS; SUBCUTANEOUS at 16:59

## 2021-01-01 RX ADMIN — SODIUM CHLORIDE, PRESERVATIVE FREE 10 ML: 5 INJECTION INTRAVENOUS at 20:19

## 2021-01-01 RX ADMIN — MICONAZOLE NITRATE: 20 POWDER TOPICAL at 10:29

## 2021-01-01 RX ADMIN — FUROSEMIDE 40 MG: 10 INJECTION, SOLUTION INTRAMUSCULAR; INTRAVENOUS at 14:55

## 2021-01-01 RX ADMIN — AMITRIPTYLINE HYDROCHLORIDE 100 MG: 50 TABLET, FILM COATED ORAL at 21:26

## 2021-01-01 RX ADMIN — HEPARIN SODIUM 7290 UNITS: 1000 INJECTION INTRAVENOUS; SUBCUTANEOUS at 21:01

## 2021-01-01 RX ADMIN — MEROPENEM 500 MG: 500 INJECTION, POWDER, FOR SOLUTION INTRAVENOUS at 05:00

## 2021-01-01 RX ADMIN — APIXABAN 5 MG: 2.5 TABLET, FILM COATED ORAL at 20:29

## 2021-01-01 RX ADMIN — SODIUM CHLORIDE 25 ML: 9 INJECTION, SOLUTION INTRAVENOUS at 09:04

## 2021-01-01 RX ADMIN — MICONAZOLE NITRATE: 20 POWDER TOPICAL at 21:22

## 2021-01-01 RX ADMIN — HEPARIN SODIUM 18 UNITS/KG/HR: 10000 INJECTION, SOLUTION INTRAVENOUS at 22:15

## 2021-01-01 RX ADMIN — PANTOPRAZOLE SODIUM 40 MG: 40 TABLET, DELAYED RELEASE ORAL at 06:37

## 2021-01-01 RX ADMIN — INSULIN LISPRO 6 UNITS: 100 INJECTION, SOLUTION INTRAVENOUS; SUBCUTANEOUS at 21:57

## 2021-01-01 RX ADMIN — HYDRALAZINE HYDROCHLORIDE 50 MG: 50 TABLET, FILM COATED ORAL at 13:00

## 2021-01-01 RX ADMIN — PANTOPRAZOLE SODIUM 40 MG: 40 TABLET, DELAYED RELEASE ORAL at 06:57

## 2021-01-01 RX ADMIN — SODIUM CHLORIDE, PRESERVATIVE FREE 10 ML: 5 INJECTION INTRAVENOUS at 08:45

## 2021-01-01 RX ADMIN — HEPARIN SODIUM 5000 UNITS: 5000 INJECTION INTRAVENOUS; SUBCUTANEOUS at 13:06

## 2021-01-01 RX ADMIN — OXYCODONE 5 MG: 5 TABLET ORAL at 09:19

## 2021-01-01 RX ADMIN — FUROSEMIDE 40 MG: 40 TABLET ORAL at 17:51

## 2021-01-01 RX ADMIN — SODIUM CHLORIDE, SODIUM LACTATE, POTASSIUM CHLORIDE, CALCIUM CHLORIDE AND DEXTROSE MONOHYDRATE 1000 ML: 5; 600; 310; 30; 20 INJECTION, SOLUTION INTRAVENOUS at 14:42

## 2021-01-01 RX ADMIN — INSULIN LISPRO 9 UNITS: 100 INJECTION, SOLUTION INTRAVENOUS; SUBCUTANEOUS at 12:04

## 2021-01-01 RX ADMIN — HEPARIN SODIUM 5000 UNITS: 5000 INJECTION INTRAVENOUS; SUBCUTANEOUS at 22:20

## 2021-01-01 RX ADMIN — ACETAMINOPHEN 650 MG: 325 TABLET ORAL at 16:43

## 2021-01-01 RX ADMIN — SODIUM CHLORIDE, PRESERVATIVE FREE 10 ML: 5 INJECTION INTRAVENOUS at 11:08

## 2021-01-01 RX ADMIN — ASPIRIN 81 MG: 81 TABLET, COATED ORAL at 09:58

## 2021-01-01 RX ADMIN — INSULIN LISPRO 12 UNITS: 100 INJECTION, SOLUTION INTRAVENOUS; SUBCUTANEOUS at 18:31

## 2021-01-01 RX ADMIN — AMITRIPTYLINE HYDROCHLORIDE 100 MG: 50 TABLET, FILM COATED ORAL at 21:33

## 2021-01-01 RX ADMIN — ESCITALOPRAM OXALATE 10 MG: 10 TABLET ORAL at 10:52

## 2021-01-01 RX ADMIN — OXYCODONE 5 MG: 5 TABLET ORAL at 16:58

## 2021-01-01 RX ADMIN — PANTOPRAZOLE SODIUM 40 MG: 40 TABLET, DELAYED RELEASE ORAL at 10:14

## 2021-01-01 RX ADMIN — ATORVASTATIN CALCIUM 20 MG: 20 TABLET, FILM COATED ORAL at 23:37

## 2021-01-01 RX ADMIN — INSULIN LISPRO 3 UNITS: 100 INJECTION, SOLUTION INTRAVENOUS; SUBCUTANEOUS at 17:54

## 2021-01-01 RX ADMIN — HYDRALAZINE HYDROCHLORIDE 50 MG: 50 TABLET, FILM COATED ORAL at 04:59

## 2021-01-01 RX ADMIN — MEROPENEM 1000 MG: 1 INJECTION, POWDER, FOR SOLUTION INTRAVENOUS at 23:16

## 2021-01-01 RX ADMIN — AMITRIPTYLINE HYDROCHLORIDE 100 MG: 50 TABLET, FILM COATED ORAL at 21:23

## 2021-01-01 RX ADMIN — AMITRIPTYLINE HYDROCHLORIDE 100 MG: 50 TABLET, FILM COATED ORAL at 20:12

## 2021-01-01 RX ADMIN — SODIUM CHLORIDE 500 ML: 9 INJECTION, SOLUTION INTRAVENOUS at 14:08

## 2021-01-01 RX ADMIN — INSULIN LISPRO 3 UNITS: 100 INJECTION, SOLUTION INTRAVENOUS; SUBCUTANEOUS at 12:15

## 2021-01-01 RX ADMIN — MEROPENEM 1000 MG: 1 INJECTION, POWDER, FOR SOLUTION INTRAVENOUS at 09:14

## 2021-01-01 RX ADMIN — ATORVASTATIN CALCIUM 20 MG: 20 TABLET, FILM COATED ORAL at 00:10

## 2021-01-01 RX ADMIN — Medication 10 ML: at 10:28

## 2021-01-01 RX ADMIN — SODIUM CHLORIDE, PRESERVATIVE FREE 10 ML: 5 INJECTION INTRAVENOUS at 21:30

## 2021-01-01 RX ADMIN — PANTOPRAZOLE SODIUM 40 MG: 40 TABLET, DELAYED RELEASE ORAL at 07:39

## 2021-01-01 RX ADMIN — FUROSEMIDE 40 MG: 10 INJECTION, SOLUTION INTRAMUSCULAR; INTRAVENOUS at 23:05

## 2021-01-01 RX ADMIN — INSULIN LISPRO 2 UNITS: 100 INJECTION, SOLUTION INTRAVENOUS; SUBCUTANEOUS at 10:18

## 2021-01-01 RX ADMIN — FUROSEMIDE 40 MG: 10 INJECTION, SOLUTION INTRAMUSCULAR; INTRAVENOUS at 20:21

## 2021-01-01 RX ADMIN — OXYCODONE 5 MG: 5 TABLET ORAL at 11:16

## 2021-01-01 RX ADMIN — AMITRIPTYLINE HYDROCHLORIDE 100 MG: 50 TABLET, FILM COATED ORAL at 21:29

## 2021-01-01 RX ADMIN — ASPIRIN 81 MG: 81 TABLET, COATED ORAL at 08:18

## 2021-01-01 RX ADMIN — SODIUM CHLORIDE, PRESERVATIVE FREE 10 ML: 5 INJECTION INTRAVENOUS at 10:25

## 2021-01-01 RX ADMIN — HALOPERIDOL LACTATE 2.5 MG: 5 INJECTION, SOLUTION INTRAMUSCULAR at 12:01

## 2021-01-01 RX ADMIN — APIXABAN 5 MG: 5 TABLET, FILM COATED ORAL at 21:57

## 2021-01-01 RX ADMIN — INSULIN LISPRO 5 UNITS: 100 INJECTION, SOLUTION INTRAVENOUS; SUBCUTANEOUS at 21:44

## 2021-01-01 RX ADMIN — TORSEMIDE 40 MG: 20 TABLET ORAL at 08:52

## 2021-01-01 RX ADMIN — MEROPENEM 1000 MG: 1 INJECTION, POWDER, FOR SOLUTION INTRAVENOUS at 05:17

## 2021-01-01 RX ADMIN — MEROPENEM 1000 MG: 1 INJECTION, POWDER, FOR SOLUTION INTRAVENOUS at 21:49

## 2021-01-01 RX ADMIN — ATORVASTATIN CALCIUM 20 MG: 20 TABLET, FILM COATED ORAL at 20:29

## 2021-01-01 RX ADMIN — LIDOCAINE HYDROCHLORIDE: 20 SOLUTION ORAL; TOPICAL at 10:15

## 2021-01-01 RX ADMIN — METHADONE HYDROCHLORIDE 140 MG: 10 CONCENTRATE ORAL at 10:55

## 2021-01-01 RX ADMIN — CEFAZOLIN 1000 MG: 1 INJECTION, POWDER, FOR SOLUTION INTRAMUSCULAR; INTRAVENOUS at 11:24

## 2021-01-01 RX ADMIN — OXYCODONE 5 MG: 5 TABLET ORAL at 22:25

## 2021-01-01 RX ADMIN — MAGNESIUM SULFATE HEPTAHYDRATE 2000 MG: 2 INJECTION, SOLUTION INTRAVENOUS at 11:47

## 2021-01-01 RX ADMIN — EPINEPHRINE 1 MG: 0.1 INJECTION, SOLUTION ENDOTRACHEAL; INTRACARDIAC; INTRAVENOUS at 08:28

## 2021-01-01 RX ADMIN — SUGAMMADEX 200 MG: 100 INJECTION, SOLUTION INTRAVENOUS at 12:27

## 2021-01-01 RX ADMIN — METHADONE HYDROCHLORIDE 140 MG: 10 TABLET ORAL at 09:58

## 2021-01-01 RX ADMIN — Medication 120 MG: at 10:37

## 2021-01-01 RX ADMIN — MICONAZOLE NITRATE: 20 POWDER TOPICAL at 08:25

## 2021-01-01 RX ADMIN — MEROPENEM 1000 MG: 1 INJECTION, POWDER, FOR SOLUTION INTRAVENOUS at 18:26

## 2021-01-01 RX ADMIN — MEROPENEM 1000 MG: 1 INJECTION, POWDER, FOR SOLUTION INTRAVENOUS at 10:27

## 2021-01-01 RX ADMIN — ATORVASTATIN CALCIUM 20 MG: 20 TABLET, FILM COATED ORAL at 21:37

## 2021-01-01 RX ADMIN — FUROSEMIDE 10 MG/HR: 10 INJECTION, SOLUTION INTRAMUSCULAR; INTRAVENOUS at 08:32

## 2021-01-01 RX ADMIN — ACETAMINOPHEN 650 MG: 325 TABLET ORAL at 12:44

## 2021-01-01 RX ADMIN — MAGNESIUM SULFATE HEPTAHYDRATE 2000 MG: 2 INJECTION, SOLUTION INTRAVENOUS at 09:21

## 2021-01-01 RX ADMIN — DOXAZOSIN 4 MG: 4 TABLET ORAL at 10:03

## 2021-01-01 RX ADMIN — FLUCONAZOLE 200 MG: 200 INJECTION, SOLUTION INTRAVENOUS at 18:34

## 2021-01-01 RX ADMIN — PANTOPRAZOLE SODIUM 40 MG: 40 TABLET, DELAYED RELEASE ORAL at 10:19

## 2021-01-01 RX ADMIN — FLUCONAZOLE 200 MG: 200 INJECTION, SOLUTION INTRAVENOUS at 17:23

## 2021-01-01 RX ADMIN — METOPROLOL SUCCINATE 50 MG: 50 TABLET, EXTENDED RELEASE ORAL at 08:35

## 2021-01-01 RX ADMIN — HEPARIN SODIUM 19 UNITS/KG/HR: 10000 INJECTION, SOLUTION INTRAVENOUS at 18:06

## 2021-01-01 RX ADMIN — INSULIN LISPRO 1 UNITS: 100 INJECTION, SOLUTION INTRAVENOUS; SUBCUTANEOUS at 20:56

## 2021-01-01 RX ADMIN — ESCITALOPRAM OXALATE 10 MG: 10 TABLET ORAL at 09:42

## 2021-01-01 RX ADMIN — SODIUM ZIRCONIUM CYCLOSILICATE 5 G: 10 POWDER, FOR SUSPENSION ORAL at 09:56

## 2021-01-01 RX ADMIN — MICONAZOLE NITRATE: 20 POWDER TOPICAL at 09:14

## 2021-01-01 RX ADMIN — GABAPENTIN 400 MG: 400 CAPSULE ORAL at 10:52

## 2021-01-01 RX ADMIN — HEPARIN SODIUM 18 UNITS/KG/HR: 10000 INJECTION, SOLUTION INTRAVENOUS at 12:39

## 2021-01-01 RX ADMIN — FERROUS SULFATE TAB 325 MG (65 MG ELEMENTAL FE) 325 MG: 325 (65 FE) TAB at 10:03

## 2021-01-01 RX ADMIN — DOXAZOSIN 4 MG: 4 TABLET ORAL at 08:49

## 2021-01-01 RX ADMIN — AMITRIPTYLINE HYDROCHLORIDE 50 MG: 50 TABLET, FILM COATED ORAL at 21:43

## 2021-01-01 RX ADMIN — INSULIN GLARGINE 12 UNITS: 100 INJECTION, SOLUTION SUBCUTANEOUS at 21:34

## 2021-01-01 RX ADMIN — ASPIRIN 81 MG: 81 TABLET, COATED ORAL at 09:01

## 2021-01-01 RX ADMIN — MEROPENEM 1000 MG: 1 INJECTION, POWDER, FOR SOLUTION INTRAVENOUS at 09:09

## 2021-01-01 RX ADMIN — PANTOPRAZOLE SODIUM 40 MG: 40 TABLET, DELAYED RELEASE ORAL at 16:20

## 2021-01-01 RX ADMIN — SODIUM CHLORIDE 25 ML: 9 INJECTION, SOLUTION INTRAVENOUS at 00:51

## 2021-01-01 RX ADMIN — METHADONE HYDROCHLORIDE 140 MG: 10 TABLET ORAL at 10:53

## 2021-01-01 RX ADMIN — ONDANSETRON 4 MG: 2 INJECTION INTRAMUSCULAR; INTRAVENOUS at 17:40

## 2021-01-01 RX ADMIN — AMITRIPTYLINE HYDROCHLORIDE 100 MG: 50 TABLET, FILM COATED ORAL at 20:54

## 2021-01-01 RX ADMIN — FUROSEMIDE 60 MG: 10 INJECTION, SOLUTION INTRAMUSCULAR; INTRAVENOUS at 18:02

## 2021-01-01 RX ADMIN — FUROSEMIDE 40 MG: 10 INJECTION, SOLUTION INTRAMUSCULAR; INTRAVENOUS at 17:00

## 2021-01-01 RX ADMIN — INSULIN LISPRO 5 UNITS: 100 INJECTION, SOLUTION INTRAVENOUS; SUBCUTANEOUS at 09:02

## 2021-01-01 RX ADMIN — FLUCONAZOLE 200 MG: 200 INJECTION, SOLUTION INTRAVENOUS at 17:31

## 2021-01-01 RX ADMIN — APIXABAN 5 MG: 5 TABLET, FILM COATED ORAL at 09:21

## 2021-01-01 RX ADMIN — INSULIN GLARGINE 12 UNITS: 100 INJECTION, SOLUTION SUBCUTANEOUS at 21:45

## 2021-01-01 RX ADMIN — GABAPENTIN 400 MG: 400 CAPSULE ORAL at 20:12

## 2021-01-01 RX ADMIN — SODIUM CHLORIDE 25 ML: 9 INJECTION, SOLUTION INTRAVENOUS at 23:15

## 2021-01-01 RX ADMIN — FUROSEMIDE 40 MG: 10 INJECTION, SOLUTION INTRAMUSCULAR; INTRAVENOUS at 20:43

## 2021-01-01 RX ADMIN — METOPROLOL SUCCINATE 50 MG: 50 TABLET, EXTENDED RELEASE ORAL at 09:13

## 2021-01-01 RX ADMIN — MAGNESIUM SULFATE HEPTAHYDRATE 2000 MG: 2 INJECTION, SOLUTION INTRAVENOUS at 08:37

## 2021-01-01 RX ADMIN — Medication 10 ML: at 20:59

## 2021-01-01 RX ADMIN — Medication 10 ML: at 12:11

## 2021-01-01 RX ADMIN — PANTOPRAZOLE SODIUM 40 MG: 40 TABLET, DELAYED RELEASE ORAL at 05:17

## 2021-01-01 RX ADMIN — Medication 10 ML: at 10:14

## 2021-01-01 RX ADMIN — HYDROMORPHONE HYDROCHLORIDE 0.25 MG: 1 INJECTION, SOLUTION INTRAMUSCULAR; INTRAVENOUS; SUBCUTANEOUS at 22:55

## 2021-01-01 RX ADMIN — CEFAZOLIN 1000 MG: 1 INJECTION, POWDER, FOR SOLUTION INTRAMUSCULAR; INTRAVENOUS at 11:10

## 2021-01-01 RX ADMIN — HYDRALAZINE HYDROCHLORIDE 50 MG: 50 TABLET, FILM COATED ORAL at 05:24

## 2021-01-01 RX ADMIN — OXYCODONE 5 MG: 5 TABLET ORAL at 14:25

## 2021-01-01 RX ADMIN — APIXABAN 5 MG: 5 TABLET, FILM COATED ORAL at 08:52

## 2021-01-01 RX ADMIN — INSULIN LISPRO 1 UNITS: 100 INJECTION, SOLUTION INTRAVENOUS; SUBCUTANEOUS at 13:18

## 2021-01-01 RX ADMIN — DOXAZOSIN 4 MG: 4 TABLET ORAL at 08:18

## 2021-01-01 RX ADMIN — INSULIN LISPRO 2 UNITS: 100 INJECTION, SOLUTION INTRAVENOUS; SUBCUTANEOUS at 21:38

## 2021-01-01 RX ADMIN — PANTOPRAZOLE SODIUM 40 MG: 40 TABLET, DELAYED RELEASE ORAL at 06:46

## 2021-01-01 RX ADMIN — MEROPENEM 1000 MG: 1 INJECTION, POWDER, FOR SOLUTION INTRAVENOUS at 21:57

## 2021-01-01 RX ADMIN — PANTOPRAZOLE SODIUM 40 MG: 40 TABLET, DELAYED RELEASE ORAL at 16:29

## 2021-01-01 RX ADMIN — VANCOMYCIN HYDROCHLORIDE 750 MG: 10 INJECTION, POWDER, LYOPHILIZED, FOR SOLUTION INTRAVENOUS at 12:50

## 2021-01-01 RX ADMIN — APIXABAN 5 MG: 2.5 TABLET, FILM COATED ORAL at 23:09

## 2021-01-01 RX ADMIN — SODIUM CHLORIDE 25 ML: 9 INJECTION, SOLUTION INTRAVENOUS at 16:57

## 2021-01-01 RX ADMIN — ATORVASTATIN CALCIUM 20 MG: 20 TABLET, FILM COATED ORAL at 21:29

## 2021-01-01 RX ADMIN — GABAPENTIN 400 MG: 400 CAPSULE ORAL at 09:05

## 2021-01-01 RX ADMIN — PANTOPRAZOLE SODIUM 40 MG: 40 TABLET, DELAYED RELEASE ORAL at 11:02

## 2021-01-01 RX ADMIN — NALOXONE HYDROCHLORIDE 0.2 MG: 0.4 INJECTION, SOLUTION INTRAMUSCULAR; INTRAVENOUS; SUBCUTANEOUS at 17:20

## 2021-01-01 RX ADMIN — FLUCONAZOLE 200 MG: 200 INJECTION, SOLUTION INTRAVENOUS at 17:40

## 2021-01-01 RX ADMIN — PHENYLEPHRINE HYDROCHLORIDE 40 MCG: 10 INJECTION, SOLUTION INTRAMUSCULAR; INTRAVENOUS; SUBCUTANEOUS at 14:13

## 2021-01-01 RX ADMIN — Medication 120 MG: at 11:51

## 2021-01-01 RX ADMIN — HEPARIN SODIUM 5000 UNITS: 5000 INJECTION INTRAVENOUS; SUBCUTANEOUS at 21:38

## 2021-01-01 RX ADMIN — ASPIRIN 81 MG: 81 TABLET, COATED ORAL at 08:52

## 2021-01-01 RX ADMIN — VANCOMYCIN HYDROCHLORIDE 750 MG: 10 INJECTION, POWDER, LYOPHILIZED, FOR SOLUTION INTRAVENOUS at 13:23

## 2021-01-01 RX ADMIN — HEPARIN SODIUM 22 UNITS/KG/HR: 10000 INJECTION, SOLUTION INTRAVENOUS at 03:55

## 2021-01-01 RX ADMIN — SODIUM BICARBONATE 25 MEQ: 84 INJECTION, SOLUTION INTRAVENOUS at 04:29

## 2021-01-01 RX ADMIN — SODIUM CHLORIDE 25 ML: 9 INJECTION, SOLUTION INTRAVENOUS at 05:17

## 2021-01-01 RX ADMIN — PANTOPRAZOLE SODIUM 40 MG: 40 TABLET, DELAYED RELEASE ORAL at 16:43

## 2021-01-01 RX ADMIN — ETOMIDATE 20 MG: 2 INJECTION, SOLUTION INTRAVENOUS at 14:47

## 2021-01-01 RX ADMIN — Medication 10 ML: at 12:12

## 2021-01-01 RX ADMIN — MEROPENEM 1000 MG: 1 INJECTION, POWDER, FOR SOLUTION INTRAVENOUS at 21:37

## 2021-01-01 RX ADMIN — INSULIN LISPRO 5 UNITS: 100 INJECTION, SOLUTION INTRAVENOUS; SUBCUTANEOUS at 16:39

## 2021-01-01 RX ADMIN — SODIUM CHLORIDE 25 ML: 9 INJECTION, SOLUTION INTRAVENOUS at 16:35

## 2021-01-01 RX ADMIN — SODIUM CHLORIDE, PRESERVATIVE FREE 10 ML: 5 INJECTION INTRAVENOUS at 08:19

## 2021-01-01 RX ADMIN — HYDRALAZINE HYDROCHLORIDE 50 MG: 50 TABLET, FILM COATED ORAL at 14:12

## 2021-01-01 RX ADMIN — ATORVASTATIN CALCIUM 20 MG: 20 TABLET, FILM COATED ORAL at 21:13

## 2021-01-01 RX ADMIN — INSULIN LISPRO 6 UNITS: 100 INJECTION, SOLUTION INTRAVENOUS; SUBCUTANEOUS at 20:59

## 2021-01-01 RX ADMIN — ESCITALOPRAM OXALATE 10 MG: 10 TABLET ORAL at 10:07

## 2021-01-01 RX ADMIN — FUROSEMIDE 10 MG/HR: 10 INJECTION, SOLUTION INTRAMUSCULAR; INTRAVENOUS at 21:49

## 2021-01-01 RX ADMIN — APIXABAN 5 MG: 5 TABLET, FILM COATED ORAL at 00:20

## 2021-01-01 RX ADMIN — GABAPENTIN 400 MG: 400 CAPSULE ORAL at 23:36

## 2021-01-01 RX ADMIN — ACETAMINOPHEN 650 MG: 325 TABLET ORAL at 12:46

## 2021-01-01 RX ADMIN — NIFEDIPINE 30 MG: 30 TABLET, EXTENDED RELEASE ORAL at 17:51

## 2021-01-01 RX ADMIN — GABAPENTIN 400 MG: 400 CAPSULE ORAL at 10:07

## 2021-01-01 RX ADMIN — ATORVASTATIN CALCIUM 20 MG: 20 TABLET, FILM COATED ORAL at 20:08

## 2021-01-01 RX ADMIN — INSULIN GLARGINE 12 UNITS: 100 INJECTION, SOLUTION SUBCUTANEOUS at 20:13

## 2021-01-01 RX ADMIN — MEROPENEM 1000 MG: 1 INJECTION, POWDER, FOR SOLUTION INTRAVENOUS at 10:56

## 2021-01-01 RX ADMIN — INSULIN LISPRO 1 UNITS: 100 INJECTION, SOLUTION INTRAVENOUS; SUBCUTANEOUS at 12:08

## 2021-01-01 RX ADMIN — INSULIN GLARGINE 12 UNITS: 100 INJECTION, SOLUTION SUBCUTANEOUS at 21:57

## 2021-01-01 RX ADMIN — VANCOMYCIN HYDROCHLORIDE 500 MG: 10 INJECTION, POWDER, LYOPHILIZED, FOR SOLUTION INTRAVENOUS at 10:11

## 2021-01-01 RX ADMIN — OXYCODONE 5 MG: 5 TABLET ORAL at 16:38

## 2021-01-01 RX ADMIN — ASPIRIN 81 MG: 81 TABLET, COATED ORAL at 16:20

## 2021-01-01 RX ADMIN — METOPROLOL SUCCINATE 50 MG: 50 TABLET, EXTENDED RELEASE ORAL at 08:14

## 2021-01-01 RX ADMIN — MEROPENEM 1000 MG: 1 INJECTION, POWDER, FOR SOLUTION INTRAVENOUS at 04:29

## 2021-01-01 RX ADMIN — VANCOMYCIN HYDROCHLORIDE 750 MG: 10 INJECTION, POWDER, LYOPHILIZED, FOR SOLUTION INTRAVENOUS at 12:31

## 2021-01-01 RX ADMIN — Medication 10 ML: at 07:50

## 2021-01-01 RX ADMIN — INSULIN LISPRO 1 UNITS: 100 INJECTION, SOLUTION INTRAVENOUS; SUBCUTANEOUS at 09:43

## 2021-01-01 RX ADMIN — CEFAZOLIN 1000 MG: 1 INJECTION, POWDER, FOR SOLUTION INTRAMUSCULAR; INTRAVENOUS at 21:31

## 2021-01-01 RX ADMIN — GADOTERIDOL 20 ML: 279.3 INJECTION, SOLUTION INTRAVENOUS at 12:27

## 2021-01-01 RX ADMIN — HEPARIN SODIUM 19 UNITS/KG/HR: 10000 INJECTION, SOLUTION INTRAVENOUS at 05:48

## 2021-01-01 RX ADMIN — ONDANSETRON 4 MG: 2 INJECTION INTRAMUSCULAR; INTRAVENOUS at 09:19

## 2021-01-01 RX ADMIN — HEPARIN SODIUM 15 UNITS/KG/HR: 10000 INJECTION, SOLUTION INTRAVENOUS at 13:15

## 2021-01-01 RX ADMIN — APIXABAN 5 MG: 2.5 TABLET, FILM COATED ORAL at 20:59

## 2021-01-01 RX ADMIN — INSULIN GLARGINE 12 UNITS: 100 INJECTION, SOLUTION SUBCUTANEOUS at 23:40

## 2021-01-01 RX ADMIN — GABAPENTIN 400 MG: 400 CAPSULE ORAL at 21:58

## 2021-01-01 RX ADMIN — INSULIN LISPRO 9 UNITS: 100 INJECTION, SOLUTION INTRAVENOUS; SUBCUTANEOUS at 19:17

## 2021-01-01 RX ADMIN — PANTOPRAZOLE SODIUM 40 MG: 40 TABLET, DELAYED RELEASE ORAL at 07:56

## 2021-01-01 RX ADMIN — METOPROLOL SUCCINATE 100 MG: 100 TABLET, EXTENDED RELEASE ORAL at 09:06

## 2021-01-01 RX ADMIN — GABAPENTIN 400 MG: 400 CAPSULE ORAL at 21:35

## 2021-01-01 RX ADMIN — INSULIN LISPRO 4 UNITS: 100 INJECTION, SOLUTION INTRAVENOUS; SUBCUTANEOUS at 12:51

## 2021-01-01 RX ADMIN — INSULIN LISPRO 1 UNITS: 100 INJECTION, SOLUTION INTRAVENOUS; SUBCUTANEOUS at 13:41

## 2021-01-01 RX ADMIN — TORSEMIDE 40 MG: 20 TABLET ORAL at 08:56

## 2021-01-01 RX ADMIN — FUROSEMIDE 40 MG: 40 TABLET ORAL at 09:08

## 2021-01-01 RX ADMIN — SODIUM CHLORIDE 25 ML: 9 INJECTION, SOLUTION INTRAVENOUS at 21:37

## 2021-01-01 RX ADMIN — HYDRALAZINE HYDROCHLORIDE 50 MG: 50 TABLET, FILM COATED ORAL at 22:06

## 2021-01-01 RX ADMIN — INSULIN LISPRO 4 UNITS: 100 INJECTION, SOLUTION INTRAVENOUS; SUBCUTANEOUS at 17:00

## 2021-01-01 RX ADMIN — APIXABAN 5 MG: 2.5 TABLET, FILM COATED ORAL at 09:40

## 2021-01-01 RX ADMIN — MICONAZOLE NITRATE: 20 POWDER TOPICAL at 09:10

## 2021-01-01 RX ADMIN — SODIUM ZIRCONIUM CYCLOSILICATE 5 G: 10 POWDER, FOR SUSPENSION ORAL at 09:00

## 2021-01-01 RX ADMIN — MICONAZOLE NITRATE: 20 POWDER TOPICAL at 00:20

## 2021-01-01 RX ADMIN — ESCITALOPRAM OXALATE 10 MG: 10 TABLET ORAL at 09:17

## 2021-01-01 RX ADMIN — ONDANSETRON 4 MG: 2 INJECTION INTRAMUSCULAR; INTRAVENOUS at 17:37

## 2021-01-01 RX ADMIN — ASPIRIN 81 MG: 81 TABLET, COATED ORAL at 08:21

## 2021-01-01 RX ADMIN — APIXABAN 5 MG: 5 TABLET, FILM COATED ORAL at 09:10

## 2021-01-01 RX ADMIN — AMITRIPTYLINE HYDROCHLORIDE 50 MG: 50 TABLET, FILM COATED ORAL at 20:32

## 2021-01-01 RX ADMIN — METOPROLOL TARTRATE 25 MG: 25 TABLET, FILM COATED ORAL at 11:02

## 2021-01-01 RX ADMIN — SODIUM CHLORIDE 25 ML: 9 INJECTION, SOLUTION INTRAVENOUS at 21:49

## 2021-01-01 RX ADMIN — ONDANSETRON 4 MG: 4 TABLET, ORALLY DISINTEGRATING ORAL at 14:15

## 2021-01-01 RX ADMIN — MEROPENEM 500 MG: 500 INJECTION, POWDER, FOR SOLUTION INTRAVENOUS at 18:17

## 2021-01-01 RX ADMIN — ALBUMIN (HUMAN) 12.5 G: 12.5 INJECTION, SOLUTION INTRAVENOUS at 14:32

## 2021-01-01 RX ADMIN — PROPOFOL 70 MCG/KG/MIN: 10 INJECTION, EMULSION INTRAVENOUS at 15:15

## 2021-01-01 RX ADMIN — ESCITALOPRAM OXALATE 10 MG: 10 TABLET ORAL at 08:59

## 2021-01-01 RX ADMIN — MEROPENEM 1000 MG: 1 INJECTION, POWDER, FOR SOLUTION INTRAVENOUS at 18:30

## 2021-01-01 RX ADMIN — Medication 80 MG: at 10:30

## 2021-01-01 RX ADMIN — METHADONE HYDROCHLORIDE 140 MG: 10 TABLET ORAL at 09:19

## 2021-01-01 RX ADMIN — SODIUM CHLORIDE, PRESERVATIVE FREE 10 ML: 5 INJECTION INTRAVENOUS at 22:04

## 2021-01-01 RX ADMIN — NIFEDIPINE 30 MG: 30 TABLET, EXTENDED RELEASE ORAL at 08:48

## 2021-01-01 RX ADMIN — FUROSEMIDE 40 MG: 40 TABLET ORAL at 10:13

## 2021-01-01 RX ADMIN — INSULIN LISPRO 9 UNITS: 100 INJECTION, SOLUTION INTRAVENOUS; SUBCUTANEOUS at 09:20

## 2021-01-01 RX ADMIN — SODIUM CHLORIDE 25 ML: 9 INJECTION, SOLUTION INTRAVENOUS at 17:22

## 2021-01-01 RX ADMIN — METOPROLOL SUCCINATE 50 MG: 50 TABLET, EXTENDED RELEASE ORAL at 08:59

## 2021-01-01 RX ADMIN — EPHEDRINE SULFATE 10 MG: 50 INJECTION INTRAVENOUS at 14:17

## 2021-01-01 RX ADMIN — SODIUM CHLORIDE 25 ML: 9 INJECTION, SOLUTION INTRAVENOUS at 11:55

## 2021-01-01 RX ADMIN — HYDRALAZINE HYDROCHLORIDE 50 MG: 50 TABLET, FILM COATED ORAL at 22:13

## 2021-01-01 RX ADMIN — PANTOPRAZOLE SODIUM 40 MG: 40 TABLET, DELAYED RELEASE ORAL at 06:15

## 2021-01-01 RX ADMIN — APIXABAN 5 MG: 5 TABLET, FILM COATED ORAL at 21:26

## 2021-01-01 RX ADMIN — ATORVASTATIN CALCIUM 20 MG: 20 TABLET, FILM COATED ORAL at 21:33

## 2021-01-01 RX ADMIN — HEPARIN SODIUM 5000 UNITS: 5000 INJECTION INTRAVENOUS; SUBCUTANEOUS at 05:08

## 2021-01-01 RX ADMIN — METOPROLOL SUCCINATE 100 MG: 100 TABLET, EXTENDED RELEASE ORAL at 20:30

## 2021-01-01 RX ADMIN — ASPIRIN 81 MG: 81 TABLET, COATED ORAL at 10:37

## 2021-01-01 RX ADMIN — INSULIN LISPRO 6 UNITS: 100 INJECTION, SOLUTION INTRAVENOUS; SUBCUTANEOUS at 18:16

## 2021-01-01 RX ADMIN — DOXAZOSIN 4 MG: 4 TABLET ORAL at 10:27

## 2021-01-01 RX ADMIN — ESCITALOPRAM OXALATE 10 MG: 10 TABLET ORAL at 08:36

## 2021-01-01 ASSESSMENT — PAIN DESCRIPTION - ORIENTATION
ORIENTATION: RIGHT;LEFT
ORIENTATION: MID
ORIENTATION: RIGHT;LEFT
ORIENTATION: RIGHT
ORIENTATION: MID
ORIENTATION: RIGHT;LEFT
ORIENTATION: LEFT;RIGHT
ORIENTATION: RIGHT;LEFT
ORIENTATION: LEFT;RIGHT
ORIENTATION: LEFT;RIGHT
ORIENTATION: LOWER;LEFT;RIGHT
ORIENTATION: LEFT;RIGHT
ORIENTATION: RIGHT
ORIENTATION: RIGHT;LEFT
ORIENTATION: MID
ORIENTATION: LEFT;RIGHT
ORIENTATION: RIGHT;LEFT
ORIENTATION: RIGHT;LOWER
ORIENTATION: LEFT
ORIENTATION: RIGHT;LEFT
ORIENTATION: RIGHT;LEFT
ORIENTATION: RIGHT;LEFT;LOWER
ORIENTATION: RIGHT;LEFT
ORIENTATION: RIGHT
ORIENTATION: RIGHT;LEFT
ORIENTATION: RIGHT;LEFT
ORIENTATION: MID
ORIENTATION: RIGHT;LEFT
ORIENTATION: RIGHT

## 2021-01-01 ASSESSMENT — PAIN DESCRIPTION - LOCATION
LOCATION: FOOT
LOCATION: HEAD
LOCATION: LEG
LOCATION: FOOT
LOCATION: LEG;FOOT
LOCATION: HEAD
LOCATION: FOOT
LOCATION: FLANK
LOCATION: HEAD
LOCATION: ABDOMEN
LOCATION: LEG;BACK;OTHER (COMMENT)
LOCATION: FOOT;LEG
LOCATION: FOOT
LOCATION: LEG
LOCATION: ABDOMEN;FOOT
LOCATION: LEG;FOOT
LOCATION: FOOT
LOCATION: ABDOMEN
LOCATION: LEG
LOCATION: HEAD
LOCATION: FOOT
LOCATION: LEG
LOCATION: FOOT
LOCATION: FOOT;LEG
LOCATION: LEG;KNEE
LOCATION: FOOT
LOCATION: LEG
LOCATION: FOOT
LOCATION: HEAD
LOCATION: FOOT
LOCATION: LEG;KNEE
LOCATION: FOOT;LEG
LOCATION: FOOT;LEG
LOCATION: LEG
LOCATION: FOOT
LOCATION: FOOT;HIP
LOCATION: HEAD
LOCATION: FOOT
LOCATION: LEG;FOOT
LOCATION: LEG;ABDOMEN
LOCATION: LEG
LOCATION: RIB CAGE
LOCATION: LEG
LOCATION: FOOT;LEG
LOCATION: FOOT;LEG;BACK
LOCATION: ABDOMEN
LOCATION: HEAD
LOCATION: ABDOMEN
LOCATION: LEG;BACK

## 2021-01-01 ASSESSMENT — PULMONARY FUNCTION TESTS
PIF_VALUE: 25
PIF_VALUE: 23
PIF_VALUE: 0
PIF_VALUE: 23
PIF_VALUE: 0
PIF_VALUE: 10
PIF_VALUE: 24
PIF_VALUE: 24
PIF_VALUE: 21
PIF_VALUE: 0
PIF_VALUE: 24
PIF_VALUE: 0
PIF_VALUE: 21
PIF_VALUE: 23
PIF_VALUE: 1
PIF_VALUE: 25
PIF_VALUE: 21
PIF_VALUE: 23
PIF_VALUE: 23
PIF_VALUE: 25
PIF_VALUE: 26
PIF_VALUE: 30
PIF_VALUE: 20
PIF_VALUE: 23
PIF_VALUE: 1
PIF_VALUE: 24
PIF_VALUE: 16
PIF_VALUE: 0
PIF_VALUE: 4
PIF_VALUE: 0
PIF_VALUE: 0
PIF_VALUE: 23
PIF_VALUE: 5
PIF_VALUE: 21
PIF_VALUE: 23
PIF_VALUE: 16
PIF_VALUE: 23
PIF_VALUE: 17
PIF_VALUE: 0
PIF_VALUE: 23
PIF_VALUE: 1
PIF_VALUE: 26
PIF_VALUE: 23
PIF_VALUE: 1
PIF_VALUE: 0
PIF_VALUE: 21
PIF_VALUE: 27
PIF_VALUE: 20
PIF_VALUE: 23
PIF_VALUE: 26
PIF_VALUE: 26
PIF_VALUE: 25
PIF_VALUE: 22
PIF_VALUE: 24
PIF_VALUE: 21
PIF_VALUE: 21
PIF_VALUE: 26
PIF_VALUE: 0
PIF_VALUE: 0
PIF_VALUE: 25
PIF_VALUE: 20
PIF_VALUE: 1
PIF_VALUE: 20
PIF_VALUE: 0
PIF_VALUE: 22
PIF_VALUE: 22
PIF_VALUE: 24
PIF_VALUE: 20
PIF_VALUE: 21
PIF_VALUE: 23
PIF_VALUE: 24
PIF_VALUE: 26
PIF_VALUE: 6
PIF_VALUE: 0
PIF_VALUE: 16
PIF_VALUE: 25
PIF_VALUE: 0
PIF_VALUE: 16
PIF_VALUE: 23
PIF_VALUE: 0
PIF_VALUE: 23
PIF_VALUE: 0
PIF_VALUE: 23
PIF_VALUE: 0
PIF_VALUE: 21
PIF_VALUE: 23
PIF_VALUE: 23
PIF_VALUE: 2
PIF_VALUE: 22
PIF_VALUE: 23
PIF_VALUE: 0
PIF_VALUE: 21
PIF_VALUE: 17
PIF_VALUE: 0
PIF_VALUE: 0
PIF_VALUE: 15
PIF_VALUE: 24
PIF_VALUE: 26
PIF_VALUE: 23
PIF_VALUE: 24
PIF_VALUE: 25
PIF_VALUE: 20
PIF_VALUE: 25
PIF_VALUE: 23
PIF_VALUE: 0
PIF_VALUE: 24
PIF_VALUE: 22
PIF_VALUE: 2
PIF_VALUE: 0
PIF_VALUE: 23
PIF_VALUE: 26
PIF_VALUE: 17
PIF_VALUE: 21
PIF_VALUE: 23
PIF_VALUE: 4
PIF_VALUE: 25
PIF_VALUE: 23
PIF_VALUE: 21
PIF_VALUE: 0
PIF_VALUE: 0
PIF_VALUE: 21
PIF_VALUE: 23
PIF_VALUE: 0
PIF_VALUE: 0
PIF_VALUE: 25
PIF_VALUE: 21
PIF_VALUE: 26
PIF_VALUE: 26
PIF_VALUE: 21
PIF_VALUE: 24
PIF_VALUE: 23
PIF_VALUE: 23
PIF_VALUE: 25
PIF_VALUE: 20
PIF_VALUE: 23
PIF_VALUE: 17
PIF_VALUE: 1
PIF_VALUE: 17
PIF_VALUE: 26
PIF_VALUE: 20
PIF_VALUE: 20
PIF_VALUE: 26
PIF_VALUE: 20
PIF_VALUE: 23
PIF_VALUE: 20
PIF_VALUE: 3
PIF_VALUE: 26
PIF_VALUE: 26
PIF_VALUE: 23
PIF_VALUE: 21
PIF_VALUE: 24
PIF_VALUE: 23
PIF_VALUE: 20
PIF_VALUE: 1
PIF_VALUE: 0
PIF_VALUE: 2
PIF_VALUE: 23
PIF_VALUE: 20
PIF_VALUE: 23
PIF_VALUE: 1
PIF_VALUE: 26
PIF_VALUE: 20
PIF_VALUE: 20
PIF_VALUE: 23
PIF_VALUE: 23
PIF_VALUE: 25
PIF_VALUE: 20
PIF_VALUE: 0
PIF_VALUE: 10
PIF_VALUE: 20
PIF_VALUE: 25
PIF_VALUE: 20
PIF_VALUE: 26
PIF_VALUE: 0
PIF_VALUE: 23
PIF_VALUE: 24
PIF_VALUE: 23
PIF_VALUE: 17
PIF_VALUE: 23
PIF_VALUE: 23
PIF_VALUE: 2
PIF_VALUE: 21
PIF_VALUE: 25
PIF_VALUE: 26
PIF_VALUE: 24
PIF_VALUE: 23
PIF_VALUE: 23
PIF_VALUE: 24
PIF_VALUE: 17
PIF_VALUE: 0
PIF_VALUE: 1
PIF_VALUE: 23
PIF_VALUE: 27
PIF_VALUE: 21
PIF_VALUE: 23
PIF_VALUE: 0
PIF_VALUE: 15
PIF_VALUE: 23
PIF_VALUE: 23
PIF_VALUE: 35
PIF_VALUE: 26
PIF_VALUE: 23
PIF_VALUE: 0
PIF_VALUE: 1
PIF_VALUE: 24
PIF_VALUE: 0
PIF_VALUE: 20
PIF_VALUE: 24
PIF_VALUE: 25
PIF_VALUE: 25
PIF_VALUE: 24
PIF_VALUE: 26
PIF_VALUE: 23
PIF_VALUE: 20
PIF_VALUE: 23
PIF_VALUE: 3

## 2021-01-01 ASSESSMENT — PAIN SCALES - GENERAL
PAINLEVEL_OUTOF10: 0
PAINLEVEL_OUTOF10: 0
PAINLEVEL_OUTOF10: 10
PAINLEVEL_OUTOF10: 0
PAINLEVEL_OUTOF10: 2
PAINLEVEL_OUTOF10: 10
PAINLEVEL_OUTOF10: 9
PAINLEVEL_OUTOF10: 0
PAINLEVEL_OUTOF10: 8
PAINLEVEL_OUTOF10: 3
PAINLEVEL_OUTOF10: 8
PAINLEVEL_OUTOF10: 0
PAINLEVEL_OUTOF10: 0
PAINLEVEL_OUTOF10: 8
PAINLEVEL_OUTOF10: 0
PAINLEVEL_OUTOF10: 8
PAINLEVEL_OUTOF10: 0
PAINLEVEL_OUTOF10: 8
PAINLEVEL_OUTOF10: 0
PAINLEVEL_OUTOF10: 0
PAINLEVEL_OUTOF10: 7
PAINLEVEL_OUTOF10: 0
PAINLEVEL_OUTOF10: 0
PAINLEVEL_OUTOF10: 7
PAINLEVEL_OUTOF10: 0
PAINLEVEL_OUTOF10: 8
PAINLEVEL_OUTOF10: 0
PAINLEVEL_OUTOF10: 8
PAINLEVEL_OUTOF10: 0
PAINLEVEL_OUTOF10: 0
PAINLEVEL_OUTOF10: 2
PAINLEVEL_OUTOF10: 6
PAINLEVEL_OUTOF10: 0
PAINLEVEL_OUTOF10: 4
PAINLEVEL_OUTOF10: 0
PAINLEVEL_OUTOF10: 4
PAINLEVEL_OUTOF10: 0
PAINLEVEL_OUTOF10: 6
PAINLEVEL_OUTOF10: 8
PAINLEVEL_OUTOF10: 0
PAINLEVEL_OUTOF10: 9
PAINLEVEL_OUTOF10: 8
PAINLEVEL_OUTOF10: 0
PAINLEVEL_OUTOF10: 9
PAINLEVEL_OUTOF10: 0
PAINLEVEL_OUTOF10: 8
PAINLEVEL_OUTOF10: 0
PAINLEVEL_OUTOF10: 3
PAINLEVEL_OUTOF10: 0
PAINLEVEL_OUTOF10: 7
PAINLEVEL_OUTOF10: 3
PAINLEVEL_OUTOF10: 0
PAINLEVEL_OUTOF10: 0
PAINLEVEL_OUTOF10: 8
PAINLEVEL_OUTOF10: 8
PAINLEVEL_OUTOF10: 0
PAINLEVEL_OUTOF10: 9
PAINLEVEL_OUTOF10: 0
PAINLEVEL_OUTOF10: 4
PAINLEVEL_OUTOF10: 4
PAINLEVEL_OUTOF10: 8
PAINLEVEL_OUTOF10: 7
PAINLEVEL_OUTOF10: 8
PAINLEVEL_OUTOF10: 0
PAINLEVEL_OUTOF10: 8
PAINLEVEL_OUTOF10: 8
PAINLEVEL_OUTOF10: 0
PAINLEVEL_OUTOF10: 2
PAINLEVEL_OUTOF10: 0
PAINLEVEL_OUTOF10: 0
PAINLEVEL_OUTOF10: 9
PAINLEVEL_OUTOF10: 7
PAINLEVEL_OUTOF10: 3
PAINLEVEL_OUTOF10: 7
PAINLEVEL_OUTOF10: 3
PAINLEVEL_OUTOF10: 0
PAINLEVEL_OUTOF10: 8
PAINLEVEL_OUTOF10: 0
PAINLEVEL_OUTOF10: 9
PAINLEVEL_OUTOF10: 3
PAINLEVEL_OUTOF10: 8
PAINLEVEL_OUTOF10: 0
PAINLEVEL_OUTOF10: 5
PAINLEVEL_OUTOF10: 9
PAINLEVEL_OUTOF10: 7
PAINLEVEL_OUTOF10: 0
PAINLEVEL_OUTOF10: 7
PAINLEVEL_OUTOF10: 0
PAINLEVEL_OUTOF10: 3
PAINLEVEL_OUTOF10: 0
PAINLEVEL_OUTOF10: 7
PAINLEVEL_OUTOF10: 8
PAINLEVEL_OUTOF10: 0
PAINLEVEL_OUTOF10: 9
PAINLEVEL_OUTOF10: 0
PAINLEVEL_OUTOF10: 8
PAINLEVEL_OUTOF10: 0
PAINLEVEL_OUTOF10: 8
PAINLEVEL_OUTOF10: 0
PAINLEVEL_OUTOF10: 3
PAINLEVEL_OUTOF10: 0
PAINLEVEL_OUTOF10: 0
PAINLEVEL_OUTOF10: 5
PAINLEVEL_OUTOF10: 0
PAINLEVEL_OUTOF10: 8
PAINLEVEL_OUTOF10: 0
PAINLEVEL_OUTOF10: 0
PAINLEVEL_OUTOF10: 7
PAINLEVEL_OUTOF10: 0
PAINLEVEL_OUTOF10: 8
PAINLEVEL_OUTOF10: 2
PAINLEVEL_OUTOF10: 0
PAINLEVEL_OUTOF10: 9
PAINLEVEL_OUTOF10: 0
PAINLEVEL_OUTOF10: 0
PAINLEVEL_OUTOF10: 3
PAINLEVEL_OUTOF10: 4
PAINLEVEL_OUTOF10: 0
PAINLEVEL_OUTOF10: 2
PAINLEVEL_OUTOF10: 0
PAINLEVEL_OUTOF10: 8
PAINLEVEL_OUTOF10: 0
PAINLEVEL_OUTOF10: 0
PAINLEVEL_OUTOF10: 8
PAINLEVEL_OUTOF10: 4
PAINLEVEL_OUTOF10: 2
PAINLEVEL_OUTOF10: 8
PAINLEVEL_OUTOF10: 10
PAINLEVEL_OUTOF10: 0
PAINLEVEL_OUTOF10: 7
PAINLEVEL_OUTOF10: 6
PAINLEVEL_OUTOF10: 6
PAINLEVEL_OUTOF10: 0
PAINLEVEL_OUTOF10: 8
PAINLEVEL_OUTOF10: 0
PAINLEVEL_OUTOF10: 7
PAINLEVEL_OUTOF10: 0
PAINLEVEL_OUTOF10: 0
PAINLEVEL_OUTOF10: 10
PAINLEVEL_OUTOF10: 3
PAINLEVEL_OUTOF10: 0
PAINLEVEL_OUTOF10: 8
PAINLEVEL_OUTOF10: 0
PAINLEVEL_OUTOF10: 8
PAINLEVEL_OUTOF10: 8
PAINLEVEL_OUTOF10: 4
PAINLEVEL_OUTOF10: 0
PAINLEVEL_OUTOF10: 0
PAINLEVEL_OUTOF10: 5
PAINLEVEL_OUTOF10: 0
PAINLEVEL_OUTOF10: 3
PAINLEVEL_OUTOF10: 0
PAINLEVEL_OUTOF10: 9
PAINLEVEL_OUTOF10: 7
PAINLEVEL_OUTOF10: 8
PAINLEVEL_OUTOF10: 0
PAINLEVEL_OUTOF10: 2
PAINLEVEL_OUTOF10: 0
PAINLEVEL_OUTOF10: 0
PAINLEVEL_OUTOF10: 7
PAINLEVEL_OUTOF10: 0
PAINLEVEL_OUTOF10: 8
PAINLEVEL_OUTOF10: 8
PAINLEVEL_OUTOF10: 0
PAINLEVEL_OUTOF10: 8
PAINLEVEL_OUTOF10: 8
PAINLEVEL_OUTOF10: 0
PAINLEVEL_OUTOF10: 0
PAINLEVEL_OUTOF10: 2
PAINLEVEL_OUTOF10: 2
PAINLEVEL_OUTOF10: 7
PAINLEVEL_OUTOF10: 0
PAINLEVEL_OUTOF10: 3
PAINLEVEL_OUTOF10: 0
PAINLEVEL_OUTOF10: 0
PAINLEVEL_OUTOF10: 2
PAINLEVEL_OUTOF10: 0
PAINLEVEL_OUTOF10: 7
PAINLEVEL_OUTOF10: 8
PAINLEVEL_OUTOF10: 7
PAINLEVEL_OUTOF10: 8
PAINLEVEL_OUTOF10: 7
PAINLEVEL_OUTOF10: 0
PAINLEVEL_OUTOF10: 4
PAINLEVEL_OUTOF10: 0
PAINLEVEL_OUTOF10: 8
PAINLEVEL_OUTOF10: 3

## 2021-01-01 ASSESSMENT — PAIN - FUNCTIONAL ASSESSMENT
PAIN_FUNCTIONAL_ASSESSMENT: 0-10
PAIN_FUNCTIONAL_ASSESSMENT: PREVENTS OR INTERFERES SOME ACTIVE ACTIVITIES AND ADLS
PAIN_FUNCTIONAL_ASSESSMENT: ACTIVITIES ARE NOT PREVENTED
PAIN_FUNCTIONAL_ASSESSMENT: PREVENTS OR INTERFERES SOME ACTIVE ACTIVITIES AND ADLS
PAIN_FUNCTIONAL_ASSESSMENT: ACTIVITIES ARE NOT PREVENTED
PAIN_FUNCTIONAL_ASSESSMENT: PREVENTS OR INTERFERES SOME ACTIVE ACTIVITIES AND ADLS
PAIN_FUNCTIONAL_ASSESSMENT: ACTIVITIES ARE NOT PREVENTED
PAIN_FUNCTIONAL_ASSESSMENT: PREVENTS OR INTERFERES SOME ACTIVE ACTIVITIES AND ADLS
PAIN_FUNCTIONAL_ASSESSMENT: ACTIVITIES ARE NOT PREVENTED
PAIN_FUNCTIONAL_ASSESSMENT: PREVENTS OR INTERFERES SOME ACTIVE ACTIVITIES AND ADLS
PAIN_FUNCTIONAL_ASSESSMENT: PREVENTS OR INTERFERES SOME ACTIVE ACTIVITIES AND ADLS
PAIN_FUNCTIONAL_ASSESSMENT: ACTIVITIES ARE NOT PREVENTED
PAIN_FUNCTIONAL_ASSESSMENT: ACTIVITIES ARE NOT PREVENTED
PAIN_FUNCTIONAL_ASSESSMENT: PREVENTS OR INTERFERES SOME ACTIVE ACTIVITIES AND ADLS
PAIN_FUNCTIONAL_ASSESSMENT: ACTIVITIES ARE NOT PREVENTED
PAIN_FUNCTIONAL_ASSESSMENT: ACTIVITIES ARE NOT PREVENTED
PAIN_FUNCTIONAL_ASSESSMENT: PREVENTS OR INTERFERES SOME ACTIVE ACTIVITIES AND ADLS
PAIN_FUNCTIONAL_ASSESSMENT: PREVENTS OR INTERFERES SOME ACTIVE ACTIVITIES AND ADLS
PAIN_FUNCTIONAL_ASSESSMENT: ACTIVITIES ARE NOT PREVENTED
PAIN_FUNCTIONAL_ASSESSMENT: PREVENTS OR INTERFERES SOME ACTIVE ACTIVITIES AND ADLS
PAIN_FUNCTIONAL_ASSESSMENT: PREVENTS OR INTERFERES WITH MANY ACTIVE NOT PASSIVE ACTIVITIES
PAIN_FUNCTIONAL_ASSESSMENT: PREVENTS OR INTERFERES SOME ACTIVE ACTIVITIES AND ADLS

## 2021-01-01 ASSESSMENT — ENCOUNTER SYMPTOMS
SHORTNESS OF BREATH: 0
SORE THROAT: 0
APNEA: 0
CHEST TIGHTNESS: 0
SORE THROAT: 0
CHOKING: 0
CHEST TIGHTNESS: 0
VOMITING: 0
APNEA: 0
CHOKING: 0
SHORTNESS OF BREATH: 0
RHINORRHEA: 0
DIARRHEA: 0
ABDOMINAL PAIN: 0
COLOR CHANGE: 0
COUGH: 0
EYES NEGATIVE: 1
VOMITING: 0
ROS SKIN COMMENTS: BILATERAL FOOT WOUNDS
SHORTNESS OF BREATH: 0
SHORTNESS OF BREATH: 0
DIARRHEA: 0
NAUSEA: 0
COUGH: 0
BACK PAIN: 0
COLOR CHANGE: 0
ABDOMINAL PAIN: 0
VOMITING: 0
NAUSEA: 0
ABDOMINAL PAIN: 0
VOMITING: 0
BLOOD IN STOOL: 0
CHEST TIGHTNESS: 0
COUGH: 0
NAUSEA: 0
NAUSEA: 0
SORE THROAT: 0
DIARRHEA: 0
COUGH: 0
CONSTIPATION: 0
COUGH: 0
CHEST TIGHTNESS: 0
SORE THROAT: 0
TROUBLE SWALLOWING: 0
EYE DISCHARGE: 0
CONSTIPATION: 0
CHEST TIGHTNESS: 0
ABDOMINAL PAIN: 0
VOMITING: 0
VOMITING: 0
DIARRHEA: 1
COUGH: 0
CONSTIPATION: 0
COLOR CHANGE: 0
WHEEZING: 0
DIARRHEA: 0
EYE REDNESS: 0
FACIAL SWELLING: 0
ABDOMINAL PAIN: 1
ABDOMINAL PAIN: 0
CONSTIPATION: 0
STRIDOR: 0
BACK PAIN: 0
NAUSEA: 0
CHEST TIGHTNESS: 0
VOMITING: 0
COUGH: 0
SHORTNESS OF BREATH: 0
DIARRHEA: 0
PHOTOPHOBIA: 0
BLOOD IN STOOL: 0
BLOOD IN STOOL: 0
DIARRHEA: 0
PHOTOPHOBIA: 0
SHORTNESS OF BREATH: 0
APNEA: 0
CONSTIPATION: 0
NAUSEA: 0
DIARRHEA: 0
SHORTNESS OF BREATH: 0
COLOR CHANGE: 0
CHOKING: 0
SORE THROAT: 0
EYE DISCHARGE: 0
NAUSEA: 0
ABDOMINAL DISTENTION: 0
SHORTNESS OF BREATH: 1
ABDOMINAL PAIN: 0
WHEEZING: 0

## 2021-01-01 ASSESSMENT — PAIN DESCRIPTION - PAIN TYPE
TYPE: CHRONIC PAIN
TYPE: ACUTE PAIN
TYPE: ACUTE PAIN
TYPE: CHRONIC PAIN
TYPE: ACUTE PAIN
TYPE: CHRONIC PAIN
TYPE: ACUTE PAIN
TYPE: ACUTE PAIN;CHRONIC PAIN
TYPE: SURGICAL PAIN;CHRONIC PAIN
TYPE: ACUTE PAIN;CHRONIC PAIN
TYPE: CHRONIC PAIN
TYPE: ACUTE PAIN
TYPE: ACUTE PAIN;CHRONIC PAIN
TYPE: CHRONIC PAIN
TYPE: ACUTE PAIN
TYPE: CHRONIC PAIN
TYPE: CHRONIC PAIN
TYPE: ACUTE PAIN;CHRONIC PAIN
TYPE: ACUTE PAIN
TYPE: CHRONIC PAIN
TYPE: CHRONIC PAIN
TYPE: ACUTE PAIN
TYPE: CHRONIC PAIN
TYPE: CHRONIC PAIN
TYPE: ACUTE PAIN
TYPE: CHRONIC PAIN
TYPE: CHRONIC PAIN;ACUTE PAIN
TYPE: CHRONIC PAIN
TYPE: ACUTE PAIN
TYPE: CHRONIC PAIN
TYPE: ACUTE PAIN
TYPE: ACUTE PAIN
TYPE: CHRONIC PAIN
TYPE: ACUTE PAIN
TYPE: CHRONIC PAIN

## 2021-01-01 ASSESSMENT — PAIN DESCRIPTION - ONSET
ONSET: ON-GOING
ONSET: AWAKENED FROM SLEEP
ONSET: ON-GOING
ONSET: GRADUAL
ONSET: ON-GOING
ONSET: GRADUAL
ONSET: ON-GOING
ONSET: PROGRESSIVE
ONSET: ON-GOING
ONSET: GRADUAL

## 2021-01-01 ASSESSMENT — PAIN DESCRIPTION - PROGRESSION
CLINICAL_PROGRESSION: GRADUALLY WORSENING
CLINICAL_PROGRESSION: NOT CHANGED
CLINICAL_PROGRESSION: GRADUALLY WORSENING
CLINICAL_PROGRESSION: NOT CHANGED
CLINICAL_PROGRESSION: GRADUALLY WORSENING
CLINICAL_PROGRESSION: NOT CHANGED
CLINICAL_PROGRESSION: GRADUALLY WORSENING
CLINICAL_PROGRESSION: NOT CHANGED
CLINICAL_PROGRESSION: GRADUALLY WORSENING
CLINICAL_PROGRESSION: GRADUALLY WORSENING
CLINICAL_PROGRESSION: NOT CHANGED
CLINICAL_PROGRESSION: GRADUALLY WORSENING
CLINICAL_PROGRESSION: NOT CHANGED
CLINICAL_PROGRESSION: GRADUALLY WORSENING
CLINICAL_PROGRESSION: NOT CHANGED
CLINICAL_PROGRESSION: GRADUALLY WORSENING
CLINICAL_PROGRESSION: NOT CHANGED
CLINICAL_PROGRESSION: NOT CHANGED
CLINICAL_PROGRESSION: GRADUALLY WORSENING
CLINICAL_PROGRESSION: NOT CHANGED
CLINICAL_PROGRESSION: GRADUALLY WORSENING
CLINICAL_PROGRESSION: GRADUALLY WORSENING
CLINICAL_PROGRESSION: NOT CHANGED
CLINICAL_PROGRESSION: GRADUALLY WORSENING
CLINICAL_PROGRESSION: NOT CHANGED
CLINICAL_PROGRESSION: GRADUALLY WORSENING
CLINICAL_PROGRESSION: NOT CHANGED
CLINICAL_PROGRESSION: GRADUALLY WORSENING
CLINICAL_PROGRESSION: GRADUALLY WORSENING
CLINICAL_PROGRESSION: NOT CHANGED
CLINICAL_PROGRESSION: GRADUALLY WORSENING
CLINICAL_PROGRESSION: NOT CHANGED
CLINICAL_PROGRESSION: GRADUALLY WORSENING

## 2021-01-01 ASSESSMENT — PAIN DESCRIPTION - DESCRIPTORS
DESCRIPTORS: ACHING
DESCRIPTORS: SHARP
DESCRIPTORS: SHARP
DESCRIPTORS: ACHING
DESCRIPTORS: SHARP
DESCRIPTORS: ACHING
DESCRIPTORS: ACHING;DISCOMFORT
DESCRIPTORS: ACHING;DISCOMFORT
DESCRIPTORS: HEADACHE
DESCRIPTORS: ACHING;HEADACHE
DESCRIPTORS: ACHING
DESCRIPTORS: DISCOMFORT
DESCRIPTORS: ACHING;DISCOMFORT
DESCRIPTORS: ACHING;DISCOMFORT
DESCRIPTORS: ACHING;CONSTANT;DISCOMFORT
DESCRIPTORS: ACHING;CRAMPING;DISCOMFORT
DESCRIPTORS: SHARP;ACHING
DESCRIPTORS: HEADACHE
DESCRIPTORS: ACHING
DESCRIPTORS: DISCOMFORT;CONSTANT
DESCRIPTORS: ACHING
DESCRIPTORS: ACHING;DISCOMFORT
DESCRIPTORS: ACHING;DISCOMFORT
DESCRIPTORS: HEADACHE
DESCRIPTORS: HEADACHE
DESCRIPTORS: ACHING
DESCRIPTORS: ACHING;HEADACHE
DESCRIPTORS: ACHING
DESCRIPTORS: ACHING
DESCRIPTORS: SHARP
DESCRIPTORS: ACHING
DESCRIPTORS: ACHING

## 2021-01-01 ASSESSMENT — PAIN DESCRIPTION - FREQUENCY
FREQUENCY: INTERMITTENT
FREQUENCY: CONTINUOUS
FREQUENCY: INTERMITTENT
FREQUENCY: INTERMITTENT
FREQUENCY: CONTINUOUS
FREQUENCY: INTERMITTENT
FREQUENCY: CONTINUOUS
FREQUENCY: INTERMITTENT
FREQUENCY: INTERMITTENT
FREQUENCY: CONTINUOUS
FREQUENCY: CONTINUOUS
FREQUENCY: INTERMITTENT
FREQUENCY: CONTINUOUS
FREQUENCY: INTERMITTENT
FREQUENCY: CONTINUOUS
FREQUENCY: INTERMITTENT
FREQUENCY: CONTINUOUS
FREQUENCY: INTERMITTENT
FREQUENCY: CONTINUOUS
FREQUENCY: CONTINUOUS
FREQUENCY: INTERMITTENT
FREQUENCY: CONTINUOUS
FREQUENCY: CONTINUOUS
FREQUENCY: INTERMITTENT
FREQUENCY: INTERMITTENT
FREQUENCY: CONTINUOUS
FREQUENCY: INTERMITTENT

## 2021-01-01 ASSESSMENT — PAIN SCALES - WONG BAKER

## 2021-01-01 ASSESSMENT — PAIN DESCRIPTION - DIRECTION
RADIATING_TOWARDS: N.A
RADIATING_TOWARDS: N./A

## 2021-01-01 ASSESSMENT — COPD QUESTIONNAIRES
CAT_SEVERITY: MODERATE
CAT_SEVERITY: MILD

## 2021-01-01 ASSESSMENT — VISUAL ACUITY: OU: 1

## 2021-01-04 NOTE — PROGRESS NOTES
Department of Podiatry  Resident Progress Note     Veronica Sanchez  Allergies: Sulfa antibiotics     SUBJECTIVE  The patient is a 59 y. o. female who presents to [facility] outpatient podiatry clinic for follow up evaluation of wound to right foot. Patient reports she has been taking her antibiotic as directed. Patient denies pain to bilateral lower extremity. Patient reports changing the dressing as instructed at previous appointment over this past week. She denies any trauma to the lower extremities. She denies the dressing becoming wet or soiled. She denies nausea, vomiting, fever, chills, shortness of breath or chest pain. Patient has no other pedal complaints at this time. Past Medical History:    Past Medical History             Diagnosis Date    Amenorrhea      Anomalies of nails      Asthma 5/14/04    Athlete's foot 8/2010    Bacterial vaginosis 04/2008    Carpal tunnel syndrome 5/2007    [facility] (chronic [facility] disease) ([facility] [address]      Diabetes mellitus type II 08/2007    Diabetic neuropathy ([facility] [address] 8/10    DVT (deep venous thrombosis) ([facility] [address] 3/2004    Dyslipidemia 5/2009    Dyspareunia 05/2009    ETOH abuse 3/04/2007    Feet clawing      HTN (hypertension)      Hx of blood clots      Hyperlipidemia      MRSA (methicillin resistant staph aureus) culture positive 11/06/2017; 11/17/2017     foot; leg     Neuropathy 05/2009     polyneuropathy    Pain, back 04/2008    Pain, eye, right 5/14/04    Pancreatitis 5/14/04    Pseudocyst, pancreas 5/14/04    Scalp lesion 08/2007    Tinea pedis      Tobacco abuse 03/2008    Vaginal bleeding, abnormal 6/2007    Wears dentures             Review of Systems: As above     OBJECTIVE  Patient presents to clinic with assistance of a wheelchair, accompanied by her daughter. She is wearing a CAM boot to bilateral lower extremity.  She is alert and oriented to person, place, and time and appears to be in no acute distress.    VASCULAR: DP and PT pulses are palpable 2/4 b/l. CFT is brisk to the right TMA distal stump site RLE and remaning digits on the left foot. Skin temperature warm to cool from proximal to distal with no focal calor b/l LE. Mild diffuse non-pitting edema noted to b/l LE.     NEUROLOGIC: Protective sensation is diminished at all pedal sites b/l. Gross and epicritic sensation is diminished b/l.      DERMATOLOGIC: Dermatological changes noted B/L LE.   LLE:  Left foot toenails 2-5 discolored yellow and thickend but within normal limits of length. Webspaces left foot 2-4 clean, dry, intact. Diffuse flaky/xerotic tissue noted to B/L LE. No open wounds noted to the left lower extremity. RLE:  Full thickness ulceration right foot plantar aspect near cuboid region that measures approximately 4.2 cm x 3.1 cm x 0.2 cm. Wound base is granular and fibrotic tissue with overlying biofilm. The periwound area is macerated tissue with no surrounding erythema. No purulent drainage noted. Wound does not probe to bone, tunnel, or track. No fluctuance or crepitus noted. No malodor noted from wound.      Patient provided verbal consent for today's photos. RLE:                LLE:            MUSCULOSKELETAL: Muscle strength is 4/5 for all pedal groups tested. Pain with palpation of the right foot at distal aspect of the TMA site. Ankle joint ROM is decreased in dorsiflexion with the knee extended. 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        ASSESSMENT  1. Diabetic foot infection 2/2 full thickness ulceration, sub cuboid Right 2/2 friction- Carpenter 1  2. Onychomycosis x 4, Left  3. Macerated webspaces x 3: Left foot  4. Diabetes Mellitus Type II uncontrolled with peripheral neuropathy  5. History of non-compliance with weightbearing status and dressing changes    PLAN  -Evaluation and management x 30 minutes and greater than 50% of the time spent explaining the etiology and treatment with the patient. -Using a #15 blade, I excisionally debrided the necrotic and nonviable tissue right foot wound down to and including subcutaneous tissue. Less than 1cc of bleeding noted. Hemostasis achieved with direct pressure. Patient tolerated well. -Right lower extremity dressing: Modesta, Betadine, DSD, and Ace bandage.  -Left lower extremity dressing: Betadine to the webspaces, Ace bandage  -Instructed patient to continue to perform dressing changes every other day b/l LE consisting of the dressings described above and Betadine to webspaces as seen above. Patient understood.  -Patient instructed to be continued strict nonweightbearing to right lower extremity and heel weightbearing to left lower extremity and bilateral cam boot. Patient understood  -Plan for formal debridement in the operating room and graft application pending medical clearance.  -Patient to see primary care physician for surgical clearance as soon as possible. Patient return to go to [facility] the outpatient podiatry clinic in 1 week. If surgery is planned prior to next outpatient appointment, patient will return to clinic within 1 week of surgical intervention.     Discussed assessment and plan with RAMONE Figueroa DPM  Podiatric Resident, PGY-1  Pager #: [phone] or PerfectServe

## 2021-01-04 NOTE — PATIENT INSTRUCTIONS
Please change dressing every other day consisting of:  Right lower extremity: Modesta to wound, Betadine to wound edges, gauze, Kerlix, Ace bandage. Left lower extremity: Betadine, Band-Aid, Ace bandage. Patient is nonweightbearing to right lower extremity and heel weightbearing to left lower extremity. Please return to [address] [place] [address] podiatry clinic in 1 week for wound recheck.

## 2021-01-08 NOTE — TELEPHONE ENCOUNTER
Lab called about K    Lab Results   Component Value Date    K 6.2 () 01/07/2021      Advised pt to hold Losartan.   Take Kayexalate 15 gm x1 dose    Greg Math

## 2021-01-15 NOTE — TELEPHONE ENCOUNTER
Chart reviewed, pt not seen for IM visit, since 9/18/2020, was supposed to f/u in 3mo. Had appointment w/ IM scheduled 12/18/2020 which pt canceled. Pt advised she needs an appointment with IM, then scheduled for 12/30/2020, which was canceled. Pt now noted to have CKD4. Would avoid PPIs, such as omeprazole that she has been. Discussion needed regarding alternatives and pt needs to be seen by IM clinic.      Electronically signed by Helen Herron MD on 1/14/2021 at 9:21 PM

## 2021-01-18 NOTE — PROGRESS NOTES
Department of Podiatry  Resident Progress Note     Reji Stai  Allergies: Sulfa antibiotics     SUBJECTIVE  The patient is a 59 y. o. female who presents to [facility] outpatient podiatry clinic for follow up evaluation of wound to right foot. Patient reports she has been changing her dressing every other day as instructed at her prior appointment. She denies any new changes to her right lower extremity wound, states there has been no increased pain, no increased drainage, no increased redness. She missed her appointment last week because she did not have a ride. She has been following up with her nephrologist which she will be seen this week for her acute kidney injury. Patient admits to being diabetic, but does not recall her most recent blood sugar value. She states her blood sugars are consistently under 200 mg/dL. She denies nausea, vomiting, fever, chills, shortness of breath or chest pain. Patient has no other pedal complaints at this time.       Past Medical History:    Past Medical History             Diagnosis Date    Amenorrhea      Anomalies of nails      Asthma 5/14/04    Athlete's foot 8/2010    Bacterial vaginosis 04/2008    Carpal tunnel syndrome 5/2007    [facility] obstructive [facility]) [address] [address]      Diabetes mellitus type II 08/2007    Diabetic neuropathy ([facility] [address] 8/10    DVT (deep venous thrombosis) ([facility] [address] 3/2004    Dyslipidemia 5/2009    Dyspareunia 05/2009    ETOH abuse 3/04/2007    Feet clawing      HTN (hypertension)      Hx of blood clots      Hyperlipidemia      MRSA (methicillin resistant staph aureus) culture positive 11/06/2017; 11/17/2017     foot; leg     Neuropathy 05/2009     polyneuropathy    Pain, back 04/2008    Pain, eye, right 5/14/04    Pancreatitis 5/14/04    Pseudocyst, pancreas 5/14/04    Scalp lesion 08/2007    Tinea pedis      Tobacco abuse 03/2008    Vaginal bleeding, abnormal 6/2007    Wears dentures            Review of Systems: As above     OBJECTIVE  Patient presents to clinic with assistance of a wheelchair, accompanied by her granddaughter. She is wearing a CAM boot to bilateral lower extremity. She is alert and oriented to person, place, and time and appears to be in no acute distress.     VASCULAR: DP and PT pulses are palpable 2/4 b/l. CFT is brisk to the right TMA distal stump site RLE and remaning digits on the left foot. Skin temperature warm to cool from proximal to distal with no focal calor b/l LE. Mild diffuse non-pitting edema noted to b/l LE.     NEUROLOGIC: Protective sensation is diminished at all pedal sites b/l. Gross and epicritic sensation is diminished b/l.      DERMATOLOGIC: Dermatological changes noted B/L LE.   LLE:  Left foot toenails 2-5 discolored yellow and thickend but within normal limits of length. Webspaces left foot 2-4 macerated, however intact. Diffuse flaky/xerotic tissue noted to B/L LE. No open wounds noted to the left lower extremity. RLE:  Full thickness ulceration right foot plantar aspect near cuboid region that measures approximately 4.0 cm x 2.8 cm x 0.1 cm. Wound base is granular and fibrotic tissue with overlying biofilm. The periwound area is macerated tissue with no surrounding erythema. No purulent drainage noted. Wound does not probe to bone, tunnel, or track. No fluctuance or crepitus noted. No malodor noted from wound.      Patient provided verbal consent for today's photos. RLE:            MUSCULOSKELETAL: Muscle strength is 4/5 for all pedal groups tested. Pain with palpation of the right foot at distal aspect of the TMA site. Ankle joint ROM is decreased in dorsiflexion with the knee extended. 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        ASSESSMENT  1. Diabetic foot infection 2/2 full thickness ulceration, sub cuboid Right 2/2 friction- Carpenter 1  2. Onychomycosis x 4, Left  3.  Macerated webspaces x 3: Left foot

## 2021-01-18 NOTE — PATIENT INSTRUCTIONS
Please remain nonweight bearing to the right lower extremity. Perform every other day dressing changes as described below:    -Betadine to webspaces of left lower extremity  -Modesta to the wound on the bottom of the right foot  -gauze overtop the wound and to the front of the ankle of the right foot  -gently roll kerlix covering the foot and leg  -apply ace bandage with gentle compression from the foot to the knee\    Please attend your next appointment with your nephrologist.  Return to podiatry clinic in 1 week.

## 2021-01-25 NOTE — PROGRESS NOTES
Department of Podiatry  Resident Progress Note     Yamile Walter  Allergies: Sulfa antibiotics     SUBJECTIVE  The patient is a 59 y. o. female who presents to [facility] outpatient podiatry clinic for follow up evaluation of wound to right foot. Patient states she was unable to have her labs drawn as instructed nephrologist this past week because she did not have a ride. She says she will be having drawn this week. She states her dressing was changed as instructed, was changed twice this past week. She denies any new changes to the wound on right lower extremity. Patient mitts to being diabetic and regularly checks her blood sugar, she states last night her blood sugar was 220 mg/dL. She denies nausea, vomiting, fever, chills, shortness of breath or chest pain. Patient has no other pedal complaints at this time.       Past Medical History:    Past Medical History             Diagnosis Date    Amenorrhea      Anomalies of nails      Asthma 5/14/04    Athlete's foot 8/2010    Bacterial vaginosis 04/2008    Carpal tunnel syndrome 5/2007    [facility]chronic obstructive [facility] disease) [facility] [address]      Diabetes mellitus type II 08/2007    Diabetic neuropathy ([facility] [address] 8/10    DVT (deep venous thrombosis) ([facility] [address] 3/2004    Dyslipidemia 5/2009    Dyspareunia 05/2009    ETOH abuse 3/04/2007    Feet clawing      HTN (hypertension)      Hx of blood clots      Hyperlipidemia      MRSA (methicillin resistant staph aureus) culture positive 11/06/2017; 11/17/2017     foot; leg     Neuropathy 05/2009     polyneuropathy    Pain, back 04/2008    Pain, eye, right 5/14/04    Pancreatitis 5/14/04    Pseudocyst, pancreas 5/14/04    Scalp lesion 08/2007    Tinea pedis      Tobacco abuse 03/2008    Vaginal bleeding, abnormal 6/2007    Wears dentures             Review of Systems: As above     OBJECTIVE Patient presents to clinic with assistance of a wheelchair, unaccompanied. She is wearing a CAM boot to bilateral lower extremity. She is alert and oriented to person, place, and time and appears to be in no acute distress.     VASCULAR: DP and PT pulses are palpable 2/4 b/l. CFT is brisk to the right TMA distal stump site RLE and remaning digits on the left foot. Skin temperature warm to cool from proximal to distal with no focal calor b/l LE. Mild diffuse non-pitting edema noted to b/l LE.     NEUROLOGIC: Protective sensation is diminished at all pedal sites b/l. Gross and epicritic sensation is diminished b/l.      DERMATOLOGIC: Dermatological changes noted B/L LE.   LLE:  Left foot toenails 2-5 discolored yellow and thickend but within normal limits of length. Webspaces left foot 2-4 macerated, however intact. Diffuse flaky/xerotic tissue noted to B/L LE. No open wounds noted to the left lower extremity. RLE:  Full thickness ulceration right foot plantar aspect near cuboid region that measures approximately 3.8 cm x 2.7 cm x 0.1 cm. Wound base is granular with overlying biofilm. The periwound area is macerated tissue with no surrounding erythema. No purulent drainage noted. Wound does not probe to bone, tunnel, or track. No fluctuance or crepitus noted. No malodor noted from wound.      Patient provided verbal consent for today's photos. RLE:                MUSCULOSKELETAL: Muscle strength is 4/5 for all pedal groups tested. Pain with palpation of the right foot at distal aspect of the TMA site. Ankle joint ROM is decreased in dorsiflexion with the knee extended. 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        ASSESSMENT  1. Diabetic foot infection 2/2 full thickness ulceration, sub cuboid Right 2/2 friction- Carpenter 1  2. Onychomycosis x 4, Left  3. Macerated webspaces x 3: Left foot  4.  Diabetes Mellitus Type II uncontrolled with peripheral neuropathy 5. History of non-compliance with weightbearing status and dressing changes    PLAN  -Evaluation and management x 30 minutes and greater than 50% of the time spent explaining the etiology and treatment with the patient.   -Using a #15 blade, I excisionally debrided the necrotic and nonviable tissue right foot wound down to and including subcutaneous tissue. Less than 1cc of bleeding noted. Hemostasis achieved with direct pressure. Patient tolerated well. -Right lower extremity dressing: Modesta, DSD, and Ace bandage.  -Left lower extremity dressing: Betadine to the webspaces, Ace bandage  -Instructed patient to continue to perform dressing changes every other day b/l LE consisting of the dressings described above and Betadine to webspaces as seen above. Patient understood.  -Patient instructed to be continued strict nonweightbearing to right lower extremity and heel weightbearing to left lower extremity and bilateral cam boot. Patient understood  -Plan for skin graft substitute application of right lower extremity wound pending medical, nephrology clearance. Surgery will be under local anesthetic as patient is neuropathic. Patient is to have labs drawn as instructed by her nephrologist, and reports she will follow up with her nephrologist and PCP following labs drawn.  -Return to clinic in 1 week.     Discussed assessment and plan with RAMONE Carmen DPM  Podiatric Resident, PGY-1  Pager #: [phone] or Perfect Serve

## 2021-01-25 NOTE — PATIENT INSTRUCTIONS
Make an appointment for history and physical for graft . Remain nonweightbearing to the right lower extremity, heel weightbearing only to the left lower extremity. Please perform dressing changes daily as instructed below:  -Apply Modesta to right lower extremity wound base, apply dry sterile gauze over top of wound base and to the front of the foot and ankle. -Gently roll Kerlix from the right distal foot to the leg  -Gently apply Ace bandage from distal foot to knee of right lower extremity. -Apply Betadine daily to the webspaces of left lower extremity. Return to clinic in 1 week.

## 2021-02-01 NOTE — PROGRESS NOTES
Department of Podiatry  Resident Progress Note     Hank Carr  Allergies: Sulfa antibiotics     SUBJECTIVE  The patient is a 59 y. o. female who presents to [address] [place] [address] outpatient podiatry clinic for follow up evaluation of wound to right foot. Patient reports she had a difficult week this past week with the passing of one of her pets, and another pet suffering from a seizure unexpectedly. She states she has been pretty down because of this, and was unable to have her nephrology labs drawn this week. She states she is otherwise been feeling well and denies pain, increased drainage, increased redness to the right lower extremity wound. Her dressings been changed every other day as instructed using Modesta, gauze, Ace bandage. Patient reports compliance with her nonweightbearing status of her right lower extremity. She does admit to being diabetic and regular checks her blood sugar, however is unable to recall her value this morning. She reports this week her blood sugar has been around 120 mg/dL. She denies nausea, vomiting, fever, chills, shortness of breath or chest pain. Patient has no other pedal complaints at this time.       Past Medical History:    Past Medical History             Diagnosis Date    Amenorrhea      Anomalies of nails      Asthma 5/14/04    Athlete's foot 8/2010    Bacterial vaginosis 04/2008    Carpal tunnel syndrome 5/2007    COPD (chronic obstructive pulmonary disease) ([facility] [address]      Diabetes mellitus type II 08/2007    Diabetic neuropathy ([facility] [address] 8/10    DVT (deep venous thrombosis) ([facility] [address] 3/2004    Dyslipidemia 5/2009    Dyspareunia 05/2009    ETOH abuse 3/04/2007    Feet clawing      HTN (hypertension)      Hx of blood clots      Hyperlipidemia      MRSA (methicillin resistant staph aureus) culture positive 11/06/2017; 11/17/2017     foot; leg     Neuropathy 05/2009     polyneuropathy    Pain, back 04/2008    Pain, eye, right 5/14/04    Pancreatitis 5/14/04  Pseudocyst, pancreas 5/14/04    Scalp lesion 08/2007    Tinea pedis      Tobacco abuse 03/2008    Vaginal bleeding, abnormal 6/2007    Wears dentures             Review of Systems: As above     OBJECTIVE  Patient presents to clinic with assistance of a wheelchair, unaccompanied. She is wearing a CAM boot to bilateral lower extremity. She is alert and oriented to person, place, and time and appears to be in no acute distress.     VASCULAR: DP and PT pulses are palpable 2/4 b/l. CFT is brisk to the right TMA distal stump site RLE and remaning digits on the left foot. Skin temperature warm to cool from proximal to distal with no focal calor b/l LE. Mild diffuse non-pitting edema noted to b/l LE.     NEUROLOGIC: Protective sensation is diminished at all pedal sites b/l. Gross and epicritic sensation is diminished b/l.      DERMATOLOGIC: Dermatological changes noted B/L LE.   LLE:  Left foot toenails 2-5 discolored yellow and thickend but within normal limits of length. Webspaces left foot 2-4 macerated, however intact. Diffuse flaky/xerotic tissue noted to B/L LE. No open wounds noted to the left lower extremity. RLE:  Full thickness ulceration right foot plantar aspect near cuboid region that measures approximately 3.6 cm x 2.7 cm x 0.1 cm. Wound base is granular with overlying biofilm. No purulent drainage noted. Wound does not probe to bone, tunnel, or track. No fluctuance or crepitus noted. No malodor noted from wound.      Patient provided verbal consent for today's photos. RLE:        MUSCULOSKELETAL: Muscle strength is 4/5 for all pedal groups tested. Pain with palpation of the right foot at distal aspect of the TMA site. Ankle joint ROM is decreased in dorsiflexion with the knee extended. 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        ASSESSMENT  1. Diabetic foot infection 2/2 full thickness ulceration, sub cuboid Right 2/2 friction- Carpenter 1  2.  Onychomycosis x 4, Left

## 2021-02-01 NOTE — PATIENT INSTRUCTIONS
Podiatry Wound Care Discharge Instructions  Please perform every other day dressing changes to right lower extremity as follows  -Apply Modesta to the wound base, then gauze overtop on the bottom of the right foot  -Next apply gauze to the top of the right foot, ankle, and leg  -Next loosely wrap the right lower extremity with Kerlix starting from just in front of the toes and ending just below the knee  -Next gently wrap the bilateral foot with 4 inch Ace bandage starting from just in front of the toes and ending just above the ankle  -Next gently wrap the bilateral leg with 6 inch Ace bandage starting from just above the ankle and ending just below the right knee. Patient is non weightbearing Right lower extremity. Please return to clinic in 1 week.

## 2021-02-12 NOTE — TELEPHONE ENCOUNTER
Patient rescheduled due to sever weather prdicted for Monday 2-15-21. Given 8:30 appointment for 2-22-21 .   Patient repeated back to me correctly and verbalized understanding

## 2021-02-22 NOTE — PROGRESS NOTES
Department of Podiatry  Resident Progress Note     Nela Minors  Allergies: Sulfa antibiotics     SUBJECTIVE  The patient is a 59 y. o. female who presents to [facility] outpatient podiatry clinic for follow up evaluation of wound to right foot. Patient reports she was unable to follow-up in clinic the last 3 weeks due to weather and transportation issues. She states she has been changing the dressing every other day with Modesta, gauze, Ace bandage to the right leg and Betadine to the webspaces and Ace bandage to the left leg. She reports she is out of Betadine at this time as she has been unable to get to the store recently due to the weather. Patient reports she has been weightbearing to the right lower extremity for necessities, but does her best to remain heel weightbearing. She reports she has been feeling well and denies any new pain to her bilateral lower extremities. She does admit to being diabetic and regular checks her blood sugar, her most recent value was 164 mg/dL. She denies nausea, vomiting, fever, chills, shortness of breath or chest pain. Patient has no other pedal complaints at this time.       Past Medical History:    Past Medical History             Diagnosis Date    Amenorrhea      Anomalies of nails      Asthma 5/14/04    Athlete's foot 8/2010    Bacterial vaginosis 04/2008    Carpal tunnel syndrome 5/2007    COPD (chronic obstructive pulmonary disease) ([facility] [address]      Diabetes mellitus type II 08/2007    Diabetic neuropathy ([facility] [address] 8/10    DVT (deep venous thrombosis) ([facility] [address] 3/2004    Dyslipidemia 5/2009    Dyspareunia 05/2009    ETOH abuse 3/04/2007    Feet clawing      HTN (hypertension)      Hx of blood clots      Hyperlipidemia      MRSA (methicillin resistant staph aureus) culture positive 11/06/2017; 11/17/2017     foot; leg     Neuropathy 05/2009     polyneuropathy    Pain, back 04/2008    Pain, eye, right 5/14/04    Pancreatitis 5/14/04    Pseudocyst, pancreas 5/14/04 3. Macerated webspaces x 3: Left foot  4. Diabetes Mellitus Type II uncontrolled with peripheral neuropathy  5. History of non-compliance with weightbearing status and dressing changes    PLAN  -Evaluation and management x 30 minutes and greater than 50% of the time spent explaining the etiology and treatment with the patient.   -Using a #15 blade, I excisionally debrided the necrotic and nonviable tissue right foot wound down to and including subcutaneous tissue. Less than 1cc of bleeding noted. Hemostasis achieved with direct pressure. Patient tolerated well. -Right lower extremity dressing: Modesta, DSD, and Ace bandage.  -Left lower extremity dressing: Betadine to the webspaces, Ace bandage  -Instructed patient to continue to perform dressing changes every other day b/l LE consisting of the dressings described above and Betadine to webspaces as described above. Patient understood.  -Patient instructed to be continued strict nonweightbearing to right lower extremity in bilateral CAM boot. Patient understood  -Return to clinic in 1 week.     Discussed assessment and plan with RAMONE Gracia DPM  Podiatric Resident, PGY-1  Pager #: [phone] or Perfect Serve

## 2021-02-22 NOTE — PATIENT INSTRUCTIONS
Change dressing every other day consisting of the following:  -Apply tiffany and gauze to the wound on the bottom of the right lower leg  -Next apply gauze to the top of the right foot, ankle, and leg  -Next loosely wrap the right lower extremity with Kerlix starting from just in front of the toes and ending just below the knee  -Next gently wrap the bilateral foot with 4 inch Ace bandage starting from just in front of the toes and ending just above the ankle  -Next gently wrap the bilateral leg with 6 inch Ace bandage starting from just above the ankle and ending just below the right knee  Patient is non weightbearing Right lower extremity  Please return to clinic in 1 week.

## 2021-03-01 NOTE — PROGRESS NOTES
of a wheelchair, accompanied by family member. She is wearing a CAM boot to bilateral lower extremity. She is alert and oriented to person, place, and time and appears to be in no acute distress.     VASCULAR: DP and PT pulses are palpable 1/4 b/l. CFT is brisk to the right TMA distal stump site RLE and remaning digits on the left foot. Skin temperature warm to cool from proximal to distal with no focal calor b/l LE. Mild diffuse non-pitting edema noted to b/l LE.     NEUROLOGIC: Protective sensation is diminished at all pedal sites b/l. Gross and epicritic sensation is diminished b/l.      DERMATOLOGIC: Dermatological changes noted B/L LE.   LLE:  Left foot toenails 2-5 discolored yellow and thickend but within normal limits of length. Webspaces left foot 2-4 discolored with betadine orange, macerated, however intact. Diffuse flaky/xerotic tissue noted to B/L LE. No open wounds noted to the left lower extremity. RLE:  Full thickness ulceration right foot plantar aspect near cuboid region that measures approximately 3.7 cm x 3.5 cm x 0.2 cm. Wound base is granular with overlying biofilm and macerated abraham-wound. No purulent drainage noted. Wound does not probe to bone, tunnel, or track. No fluctuance or crepitus noted. No malodor noted from wound.      Patient provided verbal consent for today's photos. RLE:        MUSCULOSKELETAL: Muscle strength is 4/5 for all pedal groups tested. Pain with palpation of the right foot at distal aspect of the TMA site. Ankle joint ROM is decreased in dorsiflexion with the knee extended. 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        ASSESSMENT  1. Full thickness ulceration, sub cuboid Right- Carpenter 2  2. Onychomycosis x 4, Left  3. Macerated webspaces x 3: Left foot  4. Diabetes Mellitus Type II uncontrolled with peripheral neuropathy  5.  History of non-compliance with weightbearing status and dressing changes    PLAN  -Evaluation and management x 30 minutes and greater than 50% of the time spent explaining the etiology and treatment with the patient.   -Using a #15 blade, I excisionally debrided the necrotic and nonviable tissue right foot wound down to and including subcutaneous tissue. Less than 1cc of bleeding noted. Hemostasis achieved with direct pressure. Patient tolerated well. -Right lower extremity dressing: Modesta, betadine to periwound, DSD, and Ace bandage.  -Left lower extremity dressing: Betadine to the webspaces, Ace bandage  -Instructed patient to continue to perform dressing changes every other day b/l LE consisting of the dressings described above and Betadine to webspaces as described above. Patient understood.  -Patient instructed to be continued strict nonweightbearing to right lower extremity in bilateral CAM boot. Patient understood  -Return to clinic in 1 week.     Discussed assessment and plan with RAMONE Morris Res, DPM  Podiatric Resident, PGY-1

## 2021-03-08 NOTE — PROGRESS NOTES
Department of Podiatry  Resident Progress Note     Redd Tsai  Allergies: Sulfa antibiotics     SUBJECTIVE  The patient is a 59 y. o. female who presents to [address] [place] [address] outpatient podiatry clinic for follow up evaluation of wound to right foot. Patient states she has been changing the dressing with the assistance of her granddaughter every other day consisting of Betadine to the wound edges and gauze. Patient reports the maceration to her right foot wound has improved since last podiatry appointment. She denies any new pain to the right lower extremity with the exception of her longstanding phantom pain to her toes. States her left lower extremity has been doing well with no new wounds. She denies nausea, vomiting, fever, chills, shortness of breath or chest pain. Patient has no other pedal complaints at this time. Past Medical History:    Past Medical History             Diagnosis Date    Amenorrhea      Anomalies of nails      Asthma 5/14/04    Athlete's foot 8/2010    Bacterial vaginosis 04/2008    Carpal tunnel syndrome 5/2007    [facility] obstructive [facility] disease) [facility]      Diabetes mellitus type II 08/2007    Diabetic neuropathy ([facility] [address] 8/10    DVT (deep venous thrombosis) ([facility] [address] 3/2004    Dyslipidemia 5/2009    Dyspareunia 05/2009    ETOH abuse 3/04/2007    Feet clawing      HTN (hypertension)      Hx of blood clots      Hyperlipidemia      MRSA (methicillin resistant staph aureus) culture positive 11/06/2017; 11/17/2017     foot; leg     Neuropathy 05/2009     polyneuropathy    Pain, back 04/2008    Pain, eye, right 5/14/04    Pancreatitis 5/14/04    Pseudocyst, pancreas 5/14/04    Scalp lesion 08/2007    Tinea pedis      Tobacco abuse 03/2008    Vaginal bleeding, abnormal 6/2007    Wears dentures             Review of Systems: As above     OBJECTIVE  Patient presents to clinic with assistance of a wheelchair, accompanied by family member.  She is wearing a CAM boot to bilateral lower extremity. She is alert and oriented to person, place, and time and appears to be in no acute distress.     VASCULAR: DP and PT pulses are palpable 1/4 b/l. CFT is brisk to the right TMA distal stump site RLE and remaning digits on the left foot. Skin temperature warm to cool from proximal to distal with no focal calor b/l LE. Mild diffuse non-pitting edema noted to b/l LE.     NEUROLOGIC: Protective sensation is diminished at all pedal sites b/l. Gross and epicritic sensation is diminished b/l.      DERMATOLOGIC: Dermatological changes noted B/L LE.   LLE:  Left foot toenails 2-5 discolored yellow and thickend but within normal limits of length. Webspaces left foot 2-4 discolored with betadine orange, macerated, however intact. Diffuse flaky/xerotic tissue noted to B/L LE. No open wounds noted to the left lower extremity. RLE:  Full thickness ulceration right foot plantar aspect near cuboid region that measures approximately 3.6 cm x 3.5 cm x 0.2 cm. Wound base is granular with overlying biofilm and macerated abraham-wound. No purulent drainage noted. Wound does not probe to bone, tunnel, or track. No fluctuance or crepitus noted. No malodor noted from wound.      Patient provided verbal consent for today's photos. RLE:        MUSCULOSKELETAL: Muscle strength is 4/5 for all pedal groups tested. No pain with palpation of b/l LE. Ankle joint ROM is decreased in dorsiflexion with the knee extended. 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        ASSESSMENT  1. Full thickness ulceration, sub cuboid Right- Carpenter 2  2. Onychomycosis x 4, Left  3. Macerated webspaces x 3: Left foot  4. Diabetes Mellitus Type II uncontrolled with peripheral neuropathy  5. History of non-compliance with weightbearing status and dressing changes    PLAN  -Evaluation and management x 30 minutes and greater than 50% of the time spent explaining the etiology and treatment with the patient. -Using a #15 blade, I excisionally debrided the necrotic and nonviable tissue right foot wound down to and including subcutaneous tissue. Less than 3cc of bleeding noted. Hemostasis achieved with direct pressure. Patient tolerated well. -Right lower extremity dressing: Modesta, betadine to periwound, QWick absorptive dressing, DSD, and Ace bandage.  -Left lower extremity dressing: Betadine to the webspaces, Ace bandage  -Instructed patient to continue to perform dressing changes every other day b/l LE consisting of the dressings described above and Betadine to webspaces as described above. Patient understood.  -Patient instructed to be continued strict nonweightbearing to right lower extremity in bilateral CAM boot. Patient understood  -Return to clinic in 1 week. Will consider total contact cast versus surgical treatment of skin graft substitute application under local anesthetic. Will discuss with patient at next appointment.     Discussed assessment and plan with RAMONE Royal DPM  Podiatric Resident, PGY-1  Pager #: [phone] or Perfect Serve

## 2021-03-08 NOTE — PATIENT INSTRUCTIONS
Please perform every other day dressing changes consisting of the following:     Right:  Modesta to the wound base, betadine to the wound edges, blue absorbant dressing overtop the wound, gauze, kerlix, ACE bandage. Left:  Please apply Betadine to the webspaces of the left foot daily. You are to be nonweightbearing to the right lower extremity. If you experience nausea, vomiting, fever, chills, shortness breath, chest pain please return the emergency department immediately. Return to clinic in 1 week.

## 2021-03-12 PROBLEM — Z79.4 TYPE 2 DIABETES MELLITUS WITH FOOT ULCER, WITH LONG-TERM CURRENT USE OF INSULIN (HCC): Status: ACTIVE | Noted: 2021-01-01

## 2021-03-12 PROBLEM — L97.509 TYPE 2 DIABETES MELLITUS WITH FOOT ULCER, WITH LONG-TERM CURRENT USE OF INSULIN (HCC): Status: ACTIVE | Noted: 2021-01-01

## 2021-03-12 PROBLEM — E11.621 TYPE 2 DIABETES MELLITUS WITH FOOT ULCER, WITH LONG-TERM CURRENT USE OF INSULIN (HCC): Status: ACTIVE | Noted: 2021-01-01

## 2021-03-12 NOTE — PATIENT INSTRUCTIONS
Take Augmentin twice daily for 14 days. If resolved in 10 days can stop after 10 days. If area does not improve over the weekend, let us know. If develop worrisome signs of sepsis such as high fever, high respiratory rate, or altered mental status please go to ED immediatly. Check log of what meals you have eaten, including drinks and snacks.  Take note of which meals are causing high sugar spikes

## 2021-03-12 NOTE — PROGRESS NOTES
Outpatient Clinic Established Patient Note    Patient: Nica Matute  : 1965 (60 y.o.)  Date: 3/12/2021    CC: facial swelling    HPI:      59 yo hx DMT2 with ulcer and prior osteo, hx DVTs,      Slept on her face 3 days ago. Woke up with rug-burn-like blister on right face with large blister. Has drained clear liquid. Swelling has spread to involve orbit. No visual trouble or pain with eye movements. No fevers. Sugars usually running between 120 and 150. Had one low event 65, did feel symptoms. Has had several episodes of 300s. Compliant with lantus. Uses sliding scale novolog. 2u for every 50 glucose above 150. Home Meds:  Prior to Visit Medications    Medication Sig Taking? Authorizing Provider   amoxicillin-clavulanate (AUGMENTIN) 875-125 MG per tablet Take 1 tablet by mouth 2 times daily for 14 days Yes Juanjose Garcia MD   ELIQUIS 5 MG TABS tablet TK 1 T PO  BID Yes Alfredo Marshall DO   escitalopram (LEXAPRO) 10 MG tablet Take 1 tablet by mouth daily Yes Ynes Sams MD   doxazosin (CARDURA) 4 MG tablet Take 1 tablet by mouth daily Yes Tiffany Stevens MD   omeprazole (PRILOSEC) 20 MG delayed release capsule Take 1 capsule by mouth every morning Yes Tiffany Stevens MD   gabapentin (NEURONTIN) 400 MG capsule Take 1 capsule by mouth 2 times daily for 30 days.  Yes Tiffany Stevens MD   insulin glargine (BASAGLAR KWIKPEN) 100 UNIT/ML injection pen Inject 50 Units into the skin daily Yes Rosa Alarcon MD   furosemide (LASIX) 20 MG tablet Take 1 tablet by mouth daily Yes Tiffany Stevens MD   insulin aspart (NOVOLOG FLEXPEN) 100 UNIT/ML injection pen Inject 8 Units into the skin 3 times daily (before meals)  Patient taking differently: Inject 8 Units into the skin 3 times daily (before meals) Indications: states follows sliding scale  Yes Ynes Sams MD   atorvastatin (LIPITOR) 20 MG tablet Take 1 tablet by mouth nightly Yes Tiffany Stevens MD   metoprolol succinate (TOPROL XL) 50 MG extended release tablet TAKE 1 TABLET BY MOUTH EVERY NIGHT Yes Leena Montana MD   amitriptyline (ELAVIL) 100 MG tablet TAKE 1 TABLET BY MOUTH EVERY NIGHT AT BEDTIME Yes Mell Butler MD   nystatin (MYCOSTATIN) 467606 UNIT/GM powder Apply topically 2 times daily Apply to right breast and groin area BID Yes Mell Butler MD   aspirin EC 81 MG EC tablet Take 1 tablet by mouth daily Yes Mell Butler MD   Accu-Chek Softclix Lancets MISC 1 strip by Does not apply route 2 times daily Check before breakfast and before going to bed Yes Mell Butler MD   Insulin Pen Needle 31G X 5 MM MISC 1 each by Does not apply route daily Yes Mell Butler MD   Lancets MISC 1 each by Does not apply route 2 times daily Tinsley Emanuel LANCETS Yes Mell Butler MD   ammonium lactate (LAC-HYDRIN) 12 % lotion Apply topically daily. Yes Mell Butler MD   albuterol sulfate  (90 Base) MCG/ACT inhaler Inhale 2 puffs into the lungs every 6 hours as needed for Wheezing Yes Valentín Schaeffer MD   METHADONE HCL PO Take 140 mg by mouth Take whole bottle every AM  Comes from methadone clinic Yes Historical Provider, MD   Gauze Pads & Dressings MISC Please dispense 4x8 guaze, kerlix, and ace Yes Bonita Hernández DPM   Polysaccharide Iron Complex 150 MG TABS Take 1 capsule by mouth daily  Patient not taking: Reported on 3/12/2021  Harsha Whitman MD   blood glucose test strips (TRUE METRIX BLOOD GLUCOSE TEST) strip 1 each by In Vitro route 2 times daily As needed.   Mell Butler MD       Allergies:    Sulfa antibiotics    Health Maintenance Due   Topic Date Due    COVID-19 Vaccine (1) Never done    Hepatitis B vaccine (1 of 3 - Risk 3-dose series) Never done    Shingles Vaccine (1 of 2) Never done    Diabetic retinal exam  08/28/2016    Pneumococcal 0-64 years Vaccine (2 of 3 - PCV13) 11/13/2016    Breast cancer screen  09/10/2017    Low dose CT lung screening  07/22/2018    Cervical cancer screen  04/15/2019    Annual Wellness Visit (AWV)  Never done    Diabetic foot exam  06/09/2020    Colon Cancer Screen FIT/FOBT  06/10/2020    Flu vaccine (1) 09/01/2020    Lipid screen  10/07/2020       Immunization History   Administered Date(s) Administered    Influenza 11/08/2011    Influenza Virus Vaccine 09/12/2014, 10/16/2015, 10/27/2017    Influenza, Quadv, 6 mo and older, IM, PF (Flulaval, Fluarix) 01/05/2019    Influenza, Quadv, IM, PF (6 mo and older Fluzone, Flulaval, Fluarix, and 3 yrs and older Afluria) 11/10/2016, 10/03/2019    Pneumococcal Polysaccharide ([id number]) 11/13/2015    Tdap (Boostrix, Adacel) 07/22/2014       Review of Systems   Constitutional: Negative for chills, fever and unexpected weight change. HENT: Negative for congestion and sore throat. Eyes: Negative for photophobia, discharge and visual disturbance. Respiratory: Negative for cough, shortness of breath and wheezing. Cardiovascular: Negative for chest pain, palpitations and leg swelling. Gastrointestinal: Negative for abdominal pain, diarrhea, nausea and vomiting. Genitourinary: Negative for difficulty urinating and frequency. Musculoskeletal: Negative for arthralgias and myalgias. Skin: Positive for rash and wound. Negative for color change and pallor. Neurological: Negative for syncope and light-headedness. Hematological: Negative for adenopathy. Psychiatric/Behavioral: Negative for confusion. The patient is not nervous/anxious. A 10-organ Review Of Systems was obtained and otherwise unremarkable except as per HPI. Data: Old records have been reviewed electronically.     PHYSICAL EXAM:  /67 (Site: Left Upper Arm, Position: Sitting, Cuff Size: Medium Adult)   Pulse 65   Temp 97 °F (36.1 °C) (Temporal)   Resp 22   Ht 5' 2\" (1.575 m)   Wt 198 lb 9.6 oz (90.1 kg)   LMP 10/23/2015   SpO2 96%   BMI 36.32 kg/m²   Physical Exam  Constitutional:       Appearance: Normal appearance. She is obese. She is not ill-appearing or toxic-appearing. HENT:      Head: Normocephalic. Right Ear: External ear normal.      Left Ear: External ear normal.      Nose: Nose normal.      Mouth/Throat:      Mouth: Mucous membranes are moist.      Pharynx: Oropharynx is clear. Eyes:      General:         Right eye: No discharge. Left eye: No discharge. Extraocular Movements: Extraocular movements intact. Conjunctiva/sclera: Conjunctivae normal.      Pupils: Pupils are equal, round, and reactive to light. Neck:      Musculoskeletal: Normal range of motion. No neck rigidity. Cardiovascular:      Rate and Rhythm: Normal rate and regular rhythm. Pulses: Normal pulses. Heart sounds: No murmur. Pulmonary:      Breath sounds: No wheezing or rales. Abdominal:      General: There is no distension. Palpations: Abdomen is soft. Tenderness: There is no abdominal tenderness. Musculoskeletal: Normal range of motion. General: No swelling or signs of injury. Comments: Bilateral mid foot amputations. Skin:     General: Skin is warm. Coloration: Skin is not jaundiced. Comments: Blister over right face. Some surrounding erythema. No warmth. There is induration. Swelling involves right eye lid but is not erythematous in this region. Neurological:      Mental Status: She is oriented to person, place, and time. Cranial Nerves: No cranial nerve deficit. Motor: No weakness.    Psychiatric:         Mood and Affect: Mood normal.         Behavior: Behavior normal.         Assessment & Plan:      Erysipelas  - blister and surrounding erythema of right face  - start augmentin for possible erysipelas x2weeks  - asked to keep us updated with progress  - has sulfa allergy  - if no improvement on augmentin, would start doxycycline    Type 2 diabetes mellitus with foot ulcer, with long-term current use of insulin ([facility] [address]  - asked to make log of meals causing high blood sugars  - review next visit in April    Stage 3b chronic kidney disease  - had hyperkalemia. Losartan held and took kaexelate per nephro  - will get labs in 3 days    Hx DVT  - has had 2 DVTs in past  - continues to take eliquis, no bleeding     Return in about 1 month (around 4/12/2021). for f/u of facial swelling and focus on diabetes.      Dispo: Pt has been staffed with Dr. Fiona Solis  _______________  Nahun Sanford MD, 3/12/2021 3:45 PM   PGY-2

## 2021-03-22 NOTE — PROGRESS NOTES
Department of Podiatry  Resident Progress Note     Pratibha Gutierrez  Allergies: Sulfa antibiotics     SUBJECTIVE  The patient is a 59 y. o. female who presents to [facility] outpatient podiatry clinic for follow up evaluation of wound to right foot. Patient states she has been changing the dressing with the assistance of her granddaughter every other day consisting of Modesta & Qwick to the wound and gauze. Reports increased drainage and swelling right lower extremity. She denies any new pain to the right lower extremity with the exception of her longstanding phantom pain to her toes. States her left lower extremity has been doing well with no new wounds. She denies nausea, vomiting, fever, chills, shortness of breath or chest pain. Patient has no other pedal complaints at this time. Past Medical History:    Past Medical History             Diagnosis Date    Amenorrhea      Anomalies of nails      Asthma 5/14/04    Athlete's foot 8/2010    Bacterial vaginosis 04/2008    Carpal tunnel syndrome 5/2007    COPD (chronic obstructive pulmonary disease) ([facility] [address]      Diabetes mellitus type II 08/2007    Diabetic neuropathy ([facility] [address] 8/10    DVT (deep venous thrombosis) ([facility] [address] 3/2004    Dyslipidemia 5/2009    Dyspareunia 05/2009    ETOH abuse 3/04/2007    Feet clawing      HTN (hypertension)      Hx of blood clots      Hyperlipidemia      MRSA (methicillin resistant staph aureus) culture positive 11/06/2017; 11/17/2017     foot; leg     Neuropathy 05/2009     polyneuropathy    Pain, back 04/2008    Pain, eye, right 5/14/04    Pancreatitis 5/14/04    Pseudocyst, pancreas 5/14/04    Scalp lesion 08/2007    Tinea pedis      Tobacco abuse 03/2008    Vaginal bleeding, abnormal 6/2007    Wears dentures             Review of Systems: As above     OBJECTIVE  Patient presents to clinic with assistance of a wheelchair, accompanied by family member. She is wearing a CAM boot to bilateral lower extremity.  She is alert and oriented to person, place, and time and appears to be in no acute distress.     VASCULAR: DP and PT pulses are palpable 1/4 b/l. CFT is brisk to the right TMA distal stump site RLE and remaning digits on the left foot. Skin temperature warm to cool from proximal to distal with no focal calor b/l LE. Mild diffuse non-pitting edema noted to b/l LE.     NEUROLOGIC: Protective sensation is diminished at all pedal sites b/l. Gross and epicritic sensation is diminished b/l.      DERMATOLOGIC: Dermatological changes noted B/L LE.   LLE:  Left foot toenails 2-5 discolored yellow and thickend but within normal limits of length. Webspaces left foot 2-4 discolored with betadine orange, macerated, however intact. Diffuse flaky/xerotic tissue noted to B/L LE. No open wounds noted to the left lower extremity. RLE:  Full thickness ulceration right foot plantar aspect near cuboid region that measures 3.9 cm x 3.8 cm x 0.2 cm. Wound base is granular with overlying biofilm and macerated abraham-wound. No purulent drainage noted. Wound does not probe to bone, tunnel, or track. No fluctuance or crepitus noted. No malodor noted from wound.      Patient provided verbal consent for today's photos. RLE:        MUSCULOSKELETAL: Muscle strength is 4/5 for all pedal groups tested. No pain with palpation of b/l LE. Ankle joint ROM is decreased in dorsiflexion with the knee extended. 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        ASSESSMENT  1. Full thickness ulceration, sub cuboid Right- Carpenter 2  2. Onychomycosis x 4, Left  3. Macerated webspaces x 3: Left foot  4. Diabetes Mellitus Type II uncontrolled with peripheral neuropathy  5.  History of non-compliance with weightbearing status and dressing changes    PLAN  -Evaluation and management x 30 minutes and greater than 50% of the time spent explaining the etiology and treatment with the patient.   -Using a #15 blade, I excisionally debrided the necrotic and nonviable tissue right foot wound down to and including subcutaneous tissue. Less than 3cc of bleeding noted. Hemostasis achieved with direct pressure. Patient tolerated well. -Right lower extremity dressing: Modesta, betadine to periwound, QWick absorptive dressing, DSD, and Ace bandage.  -Left lower extremity dressing: Betadine to the webspaces, Ace bandage  -Instructed patient to continue to perform dressing changes every other day b/l LE consisting of the dressings described above. Patient understood.  -Patient instructed to be continued strict nonweightbearing to right lower extremity in bilateral CAM boot. Patient understood.  -Discussed with patient about surgical treatment of skin graft substitute application under local anesthetic. Patient to have H&P this week, plan for skin graft substitute following this. -Return to clinic in 1 week.       Discussed assessment and plan with Dr. Seth Christensen, DPM    JERED GrossM  Podiatric Resident, PGY-1

## 2021-03-22 NOTE — PATIENT INSTRUCTIONS
Perform every other day dressing change as follows:  -Apply Betadine around wound edges  -Apply Modesta to wound  -Apply Qwick absorptive pad with the white side to the wound over the Modesta  -Apply gauze to wound and top of foot and leg  -Loosely wrap Kerlix  -Apply Ace bandage up to knee  Please elevate lower extremities as much as possible and at all times of sitting/laying  Please follow-up with [facility] in 1 week

## 2021-03-24 NOTE — PROGRESS NOTES
Preoperative Consultation    Chintan Moya  YOB: 1965    Date of Service:  3/24/2021    Chief Complaint   Patient presents with    Pre-op Exam     Rt. bottom foot      This patient presents to the office today for a preoperative consultation at the request of surgeon, Dr. Seth Christensen DPM for planned skin graft substitue application under local anesthetic as an outpatient. Planned anesthesia: Local   Known anesthesia problems: None  Bleeding risk:  Moderate on Eliquis  Personal or FH of DVT/PE: Yes, recurrent DVTs  Patient objection to receiving blood products: Yes    Past Medical History:   Diagnosis Date    Amenorrhea     Anomalies of nails     Asthma 04    Athlete's foot 2010    Bacterial vaginosis 2008    Carpal tunnel syndrome 2007    [facility] obstructive [facility]) [facility] [address] [facility]     Diabetes mellitus type II 2007    10/1/20 pt states does accucheck 2x/day at home    Diabetic neuropathy ([facility] [address] 8/10    DVT (deep venous thrombosis) ([facility] [address] 3/2004    Dyslipidemia 2009    Dyspareunia 2009    ETOH abuse 3/04/2007    Feet clawing     HTN (hypertension)     Hx of blood clots     Hyperlipidemia     MRSA (methicillin resistant staph aureus) culture positive 2017; 2017    foot; leg     Neuropathy 2009    polyneuropathy    Pain, back 2008    Pain, eye, right 04    Pancreatitis 04    Pseudocyst, pancreas 04    Scalp lesion 2007    Tinea pedis     Tobacco abuse 2008    Vaginal bleeding, abnormal 2007    Wears dentures        Past Surgical History:   Procedure Laterality Date     SECTION  unknown    FOOT DEBRIDEMENT Right 2019    INCISION AND DRAINAGE WITH APPLICATION OF STRAVIX GRAFT RIGHT FOOT performed by Michael Kaufman DPM at [address] Right 2019    RIGHT FOOT INCISION AND DRAINAGE WITH STAGING TRANSMETATARSAL AMPUTATION performed by Michael Kaufman DPM at [address] [facility]  E AND NO CIGARETTES   Substance and Sexual Activity    Alcohol use: No     Alcohol/week: 4.0 - 5.0 standard drinks     Types: 4 - 5 Standard drinks or equivalent per week     Comment: hx of etoh abuse, denies recent etoh use    Drug use: No     Comment: Methodone Maintance    Sexual activity: Not Currently   Lifestyle    Physical activity     Days per week: Not on file     Minutes per session: Not on file    Stress: Not on file   Relationships    Social connections     Talks on phone: Not on file     Gets together: Not on file     Attends Congregational service: Not on file     Active member of club or organization: Not on file     Attends meetings of clubs or organizations: Not on file     Relationship status: Not on file    Intimate partner violence     Fear of current or ex partner: Not on file     Emotionally abused: Not on file     Physically abused: Not on file     Forced sexual activity: Not on file   Other Topics Concern    Not on file   Social History Narrative    Not on file       ROS:    Constitutional:  Denies fever or chills   Eyes:  Denies change in visual acuity   HENT:  Denies nasal congestion or sore throat   Respiratory:  Denies cough or shortness of breath   Cardiovascular:  Denies chest pain or edema   GI:  Denies abdominal pain, nausea, vomiting, bloody stools or diarrhea   :  Denies dysuria   Musculoskeletal:  Denies back pain or joint pain   Integument:  Denies rash   Neurologic:  Denies headache, focal weakness or sensory changes   Endocrine:  Denies polyuria or polydipsia   Lymphatic:  Denies swollen glands   Psychiatric:  Denies depression or anxiety     Physical Exam:    Vitals:    03/24/21 1331   BP: (!) 158/73   Site: Left Upper Arm   Position: Sitting   Cuff Size: Medium Adult   Pulse: 63   Temp: 97.5 °F (36.4 °C)   TempSrc: Temporal   SpO2: 95%   Weight: 204 lb 4.8 oz (92.7 kg)   Height: 5' 2\" (1.575 m)      Wt Readings from Last 2 Encounters:   03/12/21 198 lb 9.6 oz (90.1 kg) 01/07/21 209 lb (94.8 kg)     BP Readings from Last 3 Encounters:   03/12/21 110/67   01/07/21 138/66   10/06/20 (!) 163/80      Constitutional:  Well developed, well nourished, no acute distress, non-toxic appearance   Eyes:  PERRL, conjunctiva normal   HENT:  Atraumatic, external ears normal, nose normal, oropharynx moist, no pharyngeal exudates. Neck- normal range of motion, no tenderness, supple, rash on face  improved   Respiratory:  No respiratory distress, normal breath sounds, no rales, no wheezing   Cardiovascular:  Normal rate, normal rhythm,systolic murmur, no gallops, no rubs   GI:  Soft, nondistended, normal bowel sounds, nontender, no organomegaly, no mass, no rebound, no guarding   :  No costovertebral angle tenderness   Musculoskeletal:  No edema, no tenderness, no deformities. Back- no tenderness, BLE in boots  Integument:  Well hydrated, no rash other than face   Lymphatic:  No lymphadenopathy noted   Neurologic:  Alert & oriented x 3, CN 2-12 normal, normal motor function, normal sensory function, no focal deficits noted   Psychiatric:  Speech and behavior appropriate     EKG: normal EKG, normal sinus rhythm. Able to move self around in wheelchair     Assessment:       60 y.o. patient with planned surgery as above. Known risk factors for perioperative complications: DM2, HTN, Anemia, DVTs on Eliquis, CKD    Increased risk d/t co-morbidities but no contraindications to planned surgery barring grossly abnormal labs, especially if only using local anesthetic. Revised Cardiac Risk Index for Pre-Operative Risk from [email or web address]  on 3/24/2021  ** All calculations should be rechecked by clinician prior to use **    RESULT SUMMARY:  2 points  Class III Risk    10.1 %  30-day risk of death, MI, or cardiac arrest    From [place] 2017, based on pooled data from 5 high quality external validations (4 prospective).  These numbers are higher than those often quoted from the now-outdated original study ([facility]). See Evidence for details. INPUTS:  Elevated-risk surgery > 0 = No  History of ischemic heart disease > 0 = No  History of congestive heart failure > 0 = No  History of cerebrovascular disease > 0 = No  Pre-operative treatment with insulin > 1 = Yes  Pre-operative creatinine >2 mg/dL / 176.8 µmol/L > 1 = Yes     Plan:     1. Preoperative workup as follows: EKG, CBC, BMP  2. Change in medication regimen before surgery: Discussed w/ podiatry resident, no need to hold Eliquis preop, if NPO for procedure hold long-acting insulin on day of surgery  3. Prophylaxis for cardiac events with perioperative beta-blockers: Already on toprol  4. Deep vein thrombosis prophylaxis: Cont eliquis    1. Pre-op evaluation  -     POCT glucose; Standing  -     EKG 12 Lead; Standing  - EKG stable  -     RENAL FUNCTION PANEL; Future  -     CBC WITH AUTO DIFFERENTIAL; Future  -     EKG 12 lead  - blood pressure Adequate controlled on current meds  - If potassium decreasing and anemia stable, no contraindication to procedure  2. Type 2 diabetes mellitus with diabetic polyneuropathy, with long-term current use of insulin (HCC)  -     POCT glucose; Standing  - reported adequate control  - Better controlled, last a1c 8.8  3. Stage 3b chronic kidney disease  -     RENAL FUNCTION PANEL; Future  - Stable, but recently hyperkalemic, will recheck RFP  4. Deep vein thrombosis (DVT) of proximal lower extremity, unspecified chronicity, unspecified laterality (HCC)  - cont on eliquis perioperative period  5. Depression, unspecified depression type  6. Gastroesophageal reflux disease, unspecified whether esophagitis present  -     RENAL FUNCTION PANEL; Future  7. Anemia, unspecified type  -     CBC WITH AUTO DIFFERENTIAL; Future  8. Diabetic polyneuropathy associated with type 2 diabetes mellitus (HCC)  -     ammonium lactate (LAC-HYDRIN) 12 % lotion; Apply topically daily. , Disp-1 Bottle, R-0, Normal    DWA Dr. Paula Walter Edenilson Horner MD  PGY-3    Addendum to Resident H& P/Progress note:  I have personally seen,examined and evaluated the patient.  I have reviewed the current history, physical findings, labs and assessment and plan and agree with note as documented by resident MD ( Maxine Thompson)      Marcus Raymundo MD, [facility]

## 2021-03-26 NOTE — PROGRESS NOTES
PRE-OP INSTRUCTIONS FOR THE SURGICAL PATIENT YOU ARE UNABLE TO MAKE CONTACT FOR AN INTERVIEW:       All patients having surgery or anesthesia are required to be Covid tested. You will need to quarantined from the time you are tested until your surgery. 1. Follow all instructions provided to you from your surgeons office, including your ARRIVAL TIME. 2. Enter the MAIN entrance located on [address] and report to the desk. 3. Bring your insurance & photo ID with you. You may also be asked to pay a co-pay, as you may want to bring a check or credit card with you. 4. Leave all other valuables at home. 5. Arrange for someone to drive you home and be with you for the first 24 hours after discharge. 6. Bring your medication list with you day of surgery with doses and frequency listed (including over the counter medications)  7. You must contact your surgeon for Instructions regarding:              - ALL medication instructions, especially if taking blood thinners, aspirin, or diabetic medication.  - IF  there is a change in your physical condition such as a cold, fever, rash, cuts, sores or any other infection, especially near your surgical site. 8. A Pre-op History and Physical for surgery MUST be completed by your Physician or an Urgent Care within 30 days of your procedure date. Please bring a copy with you on the day of your procedure and along with any other testing performed. 9. DO NOT EAT ANYTHING eight hours prior to arrival for surgery. May have up to 8 ounces of water 4 hours prior to arrival for surgery. NOTE: ALL Gastric, Bariatric and Bowel surgery patients MUST follow their surgeon's instructions. 10. No gum, candy, mints, or ice chips day of procedure. 11. Please refrain from drinking alcohol the day before or day of your procedure. 12. Please do not smoke the day of your procedure.     13. Dress in loose, comfortable clothing appropriate for redressing

## 2021-03-26 NOTE — PROGRESS NOTES
[facility] / [facility] ([facility]) [address] [facility], [address]    Acknowledgment of Informed Consent for Surgical or Medical Procedure and Sedation  I agree to allow doctor(s) Mahamed Yoon and his/her associates or assistants, including residents and/or other qualified medical practitioner to perform the following medical treatment or procedure and to administer or direct the administration of sedation as necessary:  Procedure(s):     INCISION AND DRAINAGE, DEBRIDEMENT OF DIABETIC WOUND WITH PLACEMENT OF STRAVIX GRAFT RIGHT FOOT     My doctor has explained the following regarding the proposed procedure:   the explanation of the procedure   the benefits of the procedure   the potential problems that might occur during recuperation   the risks and side effects of the procedure which could include but are not limited to severe blood loss, infection, stroke or death   the benefits, risks and side effect of alternative procedures including the consequences of declining this procedure or any alternative procedures   the likelihood of achieving satisfactory results. I acknowledge no guarantee or assurance has been made to me regarding the results. I understand that during the course of this treatment/procedure, unforeseen conditions can occur which require an additional or different procedure. I agree to allow my physician or assistants to perform such extension of the original procedure as they may find necessary. I understand that sedation will often result in temporary impairment of memory and fine motor skills and that sedation can occasionally progress to a state of deep sedation or general anesthesia. I understand the risks of anesthesia for surgery include, but are not limited to, sore throat, hoarseness, injury to face, mouth, or teeth; nausea; headache; injury to blood vessels or nerves; death, brain damage, or paralysis.     I understand that if I have a Limitation of Treatment order in effect during my hospitalization, the order may or may not be in effect during this procedure. I give my doctor permission to give me blood or blood products. I understand that there are risks with receiving blood such as hepatitis, AIDS, fever, or allergic reaction. I acknowledge that the risks, benefits, and alternatives of this treatment have been explained to me and that no express or implied warranty has been given by the hospital, any blood bank, or any person or entity as to the blood or blood components transfused. At the discretion of my doctor, I agree to allow observers, equipment/product representatives and allow photographing, and/or televising of the procedure, provided my name or identity is maintained confidentially. I agree the hospital may dispose of or use for scientific or educational purposes any tissue, fluid, or body parts which may be removed.     ________________________________Date________Time______ am/pm  ([place] [facility])  Patient or Signature of Closest Relative or Legal Guardian    ________________________________Date________Time______am/pm      Page 1 of  1  Witness

## 2021-03-29 NOTE — BRIEF OP NOTE
Brief Postoperative Note      Patient: J Luis Samaniego  YOB: 1965  MRN: [id number]    Date of Procedure: 3/29/2021    Pre-Op Diagnosis: Diabetic ulceration wound right foot    Post-Op Diagnosis: Same       Procedure(s):  INCISION AND DRAINAGE, DEBRIDEMENT OF DIABETIC WOUND WITH PLACEMENT OF STRAVIX GRAFT RIGHT FOOT    Surgeon(s):  Angeles Espinosa DPM    Assistant:  Surgical Assistant: Larissa Pichardo RN  Resident: Patricio Rowe DPM    Anesthesia: Local    Injectables: Preop 20 mL 2% Lidocaine plain, Postop 2 mL Amnioflo and 29 mL 0.25% Marcaine plain    Hemostasis: Anatomical dissection and direct pressure with sterile gauze     Materials: (2x) 3x6cm Stravix, 3-0 Nylon    Estimated Blood Loss: less than 50     Complications: None    Specimens:   ID Type Source Tests Collected by Time Destination   1 : 1. RIGHT FOOT WOUND CULTURE Tissue Tissue CULTURE, FUNGUS, CULTURE, TISSUE (Canceled), CULTURE WITH SMEAR, ACID FAST BACILLIUS, CULTURE, ANAEROBIC AND AEROBIC Angeles Espinosa DPM 3/29/2021 0859        Implants:  * No implants in log *      Drains: * No LDAs found *    Findings: Full thickness hypergranular ulceration with exposed tendon. After sharp excisional debridement with a #15 blade down to fascial layer noted healthy bleeding granular tissue with healthy tendon.     Electronically signed by Patricio Rowe DPM on 3/29/2021 at 9:15 AM

## 2021-03-29 NOTE — PROGRESS NOTES
Ambulatory Surgery/Procedure Discharge Note    Vitals:    03/29/21 0945   BP: (!) 93/55   Pulse: 60   Resp: 16   Temp: 98.1 °F (36.7 °C)   SpO2: 95%       No intake/output data recorded. Restroom use offered before discharge. Yes, pt void per bathroom, assist x1. Pain assessment:  level of pain (1-10, 10 severe)  Pain Level: 0   Pt to SDS post incision and drainage, debridement of diabetic wound with placement of stravix graft right foot. Surgical dressing clean, dry and intact, foot elevated on pillow. Pt denies pain at this time. Pt denies nausea at this time. Pt tolerating PO fluids and crackers well. Discharge instructions given to pt's daughter and she states understanding of these instructions. Pt states that she is ready to go home. Pt's BP prior to discharge : 103/62  HR: 60  PCOT : 99 ( pt awake and alert)           Patient discharged to home/self care.  Patient discharged via wheel chair by transporter to waiting family/S.O.       3/29/2021 10:20 AM

## 2021-03-29 NOTE — OP NOTE
Operative Note      Patient: Olaf Blanc  YOB: 1965  MRN: [id number]    Date of Procedure: 3/29/2021    Pre-Op Diagnosis: Diabetic ulceration wound right foot    Post-Op Diagnosis: Same       Procedure(s):  INCISION AND DRAINAGE, DEBRIDEMENT OF DIABETIC WOUND WITH PLACEMENT OF STRAVIX GRAFT RIGHT FOOT    Surgeon(s):  Beth Juárez DPM    Assistant:  Surgical Assistant: Jelly Brown RN  Resident: Bárbara Ho DPM    Anesthesia: Local    Injectables: Preop 20 mL 2% Lidocaine plain, Postop 2 mL Amnioflo and 29 mL 0.25% Marcaine plain    Hemostasis: Anatomical dissection and direct pressure with sterile gauze     Materials: (2x) 3x6cm Stravix, 3-0 Nylon    Estimated Blood Loss: less than 50     Complications: None    Specimens:   ID Type Source Tests Collected by Time Destination   1 : 1. RIGHT FOOT WOUND CULTURE Tissue Tissue CULTURE, FUNGUS, CULTURE, TISSUE (Canceled), CULTURE WITH SMEAR, ACID FAST BACILLIUS, CULTURE, ANAEROBIC AND AEROBIC Beth Juárez DPM 3/29/2021 0859        Implants:  * No implants in log *      Drains: * No LDAs found *    Findings: Full thickness hypergranular ulceration with exposed tendon. After sharp excisional debridement with a #15 blade down to fascial layer noted healthy bleeding granular tissue with healthy tendon. INDICATIONS FOR PROCEDURE:  This patient has signs and symptoms clinically  consistent with the above mentioned preoperative diagnosis. Having failed conservative treatment, it was determined that the patient would benefit from surgical intervention. All potential risks, benefits, and complications were discussed with the patient prior to the scheduling of surgery. All the patient's questions were answered and no guarantees were given. The patient wished to proceed with surgery, and informed written consent was obtained. Detail of Procedure:  The patient was brought from the preoperative area and placed on the operating table in the supine interrupted suture technique. Next, 2 mL of Amniflo was injected into the wound bed to help augment the healing process and reduce the risk of adhesions form postoperatively. End of Procedure: At this time, a local anesthetic was injected about the incision sites consisting of  29 mL of 0.25% Marcaine plain, for the patient's postoperative comfort. A soft sterile dressing was applied consisting of  Adaptic, gauze, ABD pads, Kerlix and Ace bandage. The patient tolerated the procedure and anesthesia well. The patient left the OR with vital signs stable and vascular status intact to distal stump of the right foot. Following a period of post-operative monitoring, the patient will be discharged home with written and oral wound care and follow-up instructions per Dr. Warner Jones. The patient is to follow-up with the podiatry clinic within 3-5 days. The patient is to keep dressing clean, dry and intact at all times. The patient is to call with if any complications occur. All counts were correct and attending was present scrubbed in throughout entire case. Patient is strict nonweightbearing to the right lower extremity and can ambulate using her wheelchair.      Dictated on behalf of Dr. Babar Ochoa [facility] 63  Podiatry resident, PGY 2  [phone] or perfect serve    Electronically signed by Yohan Tanner DPM on 3/29/2021 at 9:19 AM

## 2021-03-29 NOTE — PROGRESS NOTES
Pt to [facility] for I&D and debridement of wound on right foot. Procedure is to be local anesthetic only, and this was verified with Dr. Lety Ambrose, so no IV placed. Pt is Covid 'negative' and reports isolating at home after test.  Pt is a diabetic, and reports no food this morning, but took full dose of lantus yesterday, and FSBS is 67. Pt reports she will start having hypoglycemic symptoms soon, and this RN gave pt apple juice prior to procedure after discussion via telephone with Dr. Lety Ambrose. Again, made decision to have no IV as still doing local.  OR staff aware that pt given apple juice. Pt to OR now for procedure.

## 2021-03-29 NOTE — H&P
Gabriela Grace    [address] Same Day Surgery Update H & P  Department of General Surgery   Surgical Service   Pre-operative History and Physical  Last H & P within the last 30 days. DIAGNOSIS:   Ulcerated, foot, right, with necrosis of bone ([address] [L97.514]    Procedure(s):  INCISION AND DRAINAGE, DEBRIDEMENT OF DIABETIC WOUND WITH PLACEMENT OF STRAVIX GRAFT RIGHT FOOT     HISTORY OF PRESENT ILLNESS:   Patient with right foot diabetic ulcer presents today for the above procedure. Covid 19:  Patient denies fever, chills, cough or known exposure to Covid-19.        Past Medical History:        Diagnosis Date    Amenorrhea     Anomalies of nails     Asthma 04    Athlete's foot 2010    Bacterial vaginosis 2008    Carpal tunnel syndrome 2007    COPD (chronic obstructive pulmonary disease) ([address]     Diabetes mellitus type II 2007    10/1/20 pt states does accucheck 2x/day at home    Diabetic neuropathy ([address] 8/10    DVT (deep venous thrombosis) ([address] 3/2004    Dyslipidemia 2009    Dyspareunia 2009    ETOH abuse 3/04/2007    Feet clawing     HTN (hypertension)     Hx of blood clots     Hyperlipidemia     MRSA (methicillin resistant staph aureus) culture positive 2017; 2017    foot; leg     Neuropathy 2009    polyneuropathy    Pain, back 2008    Pain, eye, right 04    Pancreatitis 04    Pseudocyst, pancreas 04    Scalp lesion 2007    Tinea pedis     Tobacco abuse 2008    Vaginal bleeding, abnormal 2007    Wears dentures      Past Surgical History:        Procedure Laterality Date     SECTION  unknown    FOOT DEBRIDEMENT Right 2019    INCISION AND DRAINAGE WITH APPLICATION OF STRAVIX GRAFT RIGHT FOOT performed by Jeremiah Tijerina DPM at [address] Right 2019    RIGHT FOOT INCISION AND DRAINAGE WITH STAGING TRANSMETATARSAL AMPUTATION performed by Jeremiah Tijerina DPM at [address] [facility] FOOT DEBRIDEMENT Right 2019    RIGHT FOOT DEBRIDEMENT INCISION AND DRAINAGE, OPEN DIABETIC FOOT ULCER WITH GRAFT PLACEMENT performed by James Wilson DPM at [address] Left 10/23/2019    LEFT FOOT INCISION AND DRAINAGE , DEBRIDEMENT OF OPEN WOUND, APPLICATION OF STRAVIX GRAFT [facility]    I & D left foot    OTHER SURGICAL HISTORY Right 10/20/2017    RIGHT GASTROC LENGTHENING ENDOSCOPIC, INJECTION OF AMNI GRAFT    OTHER SURGICAL HISTORY Right 2018    Diabetic foot ulcer I&D w/ integra graft application    IL DEBRIDEMENT, SKIN, SUB-Q TISSUE,MUSCLE,BONE,=<20 SQ CM Right 2018    RIGHT FOOT DEBRIDEMENT INCISION AND DRAINAGE, PARTIAL 5TH RAY AMPUTATION performed by James Wilson DPM at [address] [facility] / [address]  [address]    cryotherapy done on lesion    TOE AMPUTATION Left 2017    AMPUTATION LEFT GREAT TOE                  Past Social History:  Social History     Socioeconomic History    Marital status:      Spouse name: Not on file    Number of children: Not on file    Years of education: Not on file    Highest education level: Not on file   Occupational History    Occupation: NA   Social Needs    Financial resource strain: Not on file    Food insecurity     Worry: Not on file     Inability: Not on file    Transportation needs     Medical: Not on file     Non-medical: Not on file   Tobacco Use    Smoking status: Former Smoker     Packs/day: 1.00     Years: 30.00     Pack years: 30.00     Types: Cigarettes     Quit date: 2018     Years since quittin.8    Smokeless tobacco: Never Used    Tobacco comment: PER PT USES A NICOTINE VAPE NOW AND NO CIGARETTES   Substance and Sexual Activity    Alcohol use: No     Alcohol/week: 4.0 - 5.0 standard drinks     Types: 4 - 5 Standard drinks or equivalent per week     Comment: hx of etoh abuse, denies recent etoh use    Drug use: No     Comment: Methodone Maintance    Sexual activity: Not Currently   Lifestyle    Physical activity     Days per week: Not on file     Minutes per session: Not on file    Stress: Not on file   Relationships    Social connections     Talks on phone: Not on file     Gets together: Not on file     Attends Taoism service: Not on file     Active member of club or organization: Not on file     Attends meetings of clubs or organizations: Not on file     Relationship status: Not on file    Intimate partner violence     Fear of current or ex partner: Not on file     Emotionally abused: Not on file     Physically abused: Not on file     Forced sexual activity: Not on file   Other Topics Concern    Not on file   Social History Narrative    Not on file         Medications Prior to Admission:      Prior to Admission medications    Medication Sig Start Date End Date Taking? Authorizing Provider   ammonium lactate (LAC-HYDRIN) 12 % lotion Apply topically daily. 3/24/21   Carolyn Gloria MD   amitriptyline (ELAVIL) 100 MG tablet TAKE 1 TABLET BY MOUTH EVERY NIGHT AT BEDTIME 3/16/21   Donya Don MD   escitalopram (LEXAPRO) 10 MG tablet Take 1 tablet by mouth daily 3/12/21   Faye Sampson MD   ELIQUIS 5 MG TABS tablet TK 1 T PO  BID 3/8/21   Alfredo Marshall DO   doxazosin (CARDURA) 4 MG tablet Take 1 tablet by mouth daily 1/27/21   Spero Mcburney, MD   omeprazole (PRILOSEC) 20 MG delayed release capsule Take 1 capsule by mouth every morning 1/27/21   Spero Mcburney, MD   gabapentin (NEURONTIN) 400 MG capsule Take 1 capsule by mouth 2 times daily for 30 days.  1/14/21 3/12/21  Spero Mcburney, MD   insulin glargine [facility] AUTHORITY KWIKPEN) 100 UNIT/ML injection pen Inject 50 Units into the skin daily 12/11/20   Miriam Nichols MD   furosemide (LASIX) 20 MG tablet Take 1 tablet by mouth daily 12/10/20   Spero Mcburney, MD   insulin aspart (NOVOLOG FLEXPEN) 100 UNIT/ML injection pen Inject 8 Units into the skin 3 times daily (before meals)  Patient taking differently: Inject 8 Units into the skin 3 times daily (before meals) Indications: states follows sliding scale  11/19/20   Rob Flynn MD   atorvastatin (LIPITOR) 20 MG tablet Take 1 tablet by mouth nightly 11/5/20   Milvia Cooper MD   metoprolol succinate (TOPROL XL) 50 MG extended release tablet TAKE 1 TABLET BY MOUTH EVERY NIGHT 10/8/20   Rashawn Morin MD   nystatin (MYCOSTATIN) 250576 UNIT/GM powder Apply topically 2 times daily Apply to right breast and groin area BID 9/18/20   Yamilet Cruz MD   aspirin EC 81 MG EC tablet Take 1 tablet by mouth daily 9/18/20   Yamilet Cruz MD   Accu-Chek Softclix Lancets MISC 1 strip by Does not apply route 2 times daily Check before breakfast and before going to bed 9/18/20 3/12/21  Yamilet Cruz MD   blood glucose test strips (TRUE METRIX BLOOD GLUCOSE TEST) strip 1 each by In Vitro route 2 times daily As needed.  9/18/20 12/17/20  Yamilet Cruz MD   Insulin Pen Needle 31G X 5 MM MISC 1 each by Does not apply route daily 9/18/20   Yamilet Cruz MD   Lancets MISC 1 each by Does not apply route 2 times daily PHARMACY MAY SUBSTITUTE TO TRUE METRIX LANCETS 9/18/20   Yamilet Cruz MD   albuterol sulfate  (90 Base) MCG/ACT inhaler Inhale 2 puffs into the lungs every 6 hours as needed for Wheezing  Patient taking differently: Inhale 2 puffs into the lungs every 6 hours as needed for Wheezing  4/14/20   Ronaldo Presley MD   METHADONE HCL PO Take 140 mg by mouth Take whole bottle every AM  Comes from methadone clinic    Historical Provider, MD   Gauze Pads & Dressings MISC Please dispense 4x8 guaze, kerlix, and ace 2/23/18   Laurence Harley DPM         Allergies:  Sulfa antibiotics    PHYSICAL EXAM:      BP (!) 160/73   Pulse 60   Temp 98.3 °F (36.8 °C) (Oral)   Resp 16   Ht 5' 2\" (1.575 m)   Wt 200 lb (90.7 kg)   LMP 10/23/2015   SpO2 95%   BMI 36.58 kg/m²      Airway:  Airway patent with no audible stridor    Heart:  regular rate and rhythm, No murmur noted    Lungs:  No increased work of breathing, good air exchange, clear to auscultation bilaterally, no crackles or wheezing    Abdomen:  soft, non-distended, non-tender, no rebound tenderness or guarding, normal active bowel sounds and no masses palpated    ASSESSMENT AND PLAN     Patient is a 60 y.o. female with above specified procedure planned. 1.  The patients history and physical was obtained and signed off by the pre-admission testing department. Patient seen and focused exam done today- no new changes since last physical exam on 3/24/21    2. Access to ancillary services are available per request of the provider.     Harika Groves, ALEJANDRO - CNP     3/29/2021

## 2021-03-29 NOTE — TELEPHONE ENCOUNTER
Confirmed with  that patient can take Ibuprofen while on Eliquis. She is only to take Ibuprofen for post op pain up to three times daily max. [facility] pharmacist notified. [phone].

## 2021-04-05 NOTE — PATIENT INSTRUCTIONS
Return in 1 week  Keep right foot dressing on clean and dry  Left foot  Betadine to wound and webspace. Remain in cam boots.

## 2021-04-05 NOTE — PROGRESS NOTES
Department of Podiatry  Resident Progress Note     Mega Pizanoronna  Allergies: Sulfa antibiotics     SUBJECTIVE  The patient is a 59 y. o. female who presents to [facility] outpatient podiatry clinic s/p I&D and application of Stravix graft (DOS 3/29/2021). Patient states she has been doing well with the exception of her phantom neuropathy pain to her right foot, which is unchanged in surgery. She says she spilled coffee that got onto the dressing of her right lower extremity. She denies any drainage to the external layers of the dressing, denies increased redness up her leg, malodor. She states right due to the bandage stopping at the ankle. She denies nausea, vomiting, fever, chills, shortness of breath or chest pain. Patient has no other pedal complaints at this time. Past Medical History:    Past Medical History             Diagnosis Date    Amenorrhea      Anomalies of nails      Asthma 5/14/04    Athlete's foot 8/2010    Bacterial vaginosis 04/2008    Carpal tunnel syndrome 5/2007    COPD (chronic obstructive [facility] disease) [facility]      Diabetes mellitus type II 08/2007    Diabetic neuropathy ([facility] 8/10    DVT (deep venous thrombosis) ([facility] 3/2004    Dyslipidemia 5/2009    Dyspareunia 05/2009    ETOH abuse 3/04/2007    Feet clawing      HTN (hypertension)      Hx of blood clots      Hyperlipidemia      MRSA (methicillin resistant staph aureus) culture positive 11/06/2017; 11/17/2017     foot; leg     Neuropathy 05/2009     polyneuropathy    Pain, back 04/2008    Pain, eye, right 5/14/04    Pancreatitis 5/14/04    Pseudocyst, pancreas 5/14/04    Scalp lesion 08/2007    Tinea pedis      Tobacco abuse 03/2008    Vaginal bleeding, abnormal 6/2007    Wears dentures             Review of Systems: As above     OBJECTIVE  Patient presents to clinic with assistance of a wheelchair, accompanied by family member. She is wearing a CAM boot to bilateral lower extremity.  She is alert and oriented to person, place, and time and appears to be in no acute distress.     VASCULAR: DP and PT pulses are palpable 1/4 b/l. CFT is brisk to the right TMA distal stump site RLE and remaning digits on the left foot. Skin temperature warm to cool from proximal to distal with no focal calor b/l LE. Mild diffuse non-pitting edema noted to b/l LE.     NEUROLOGIC: Protective sensation is diminished at all pedal sites b/l. Gross and epicritic sensation is diminished b/l.      DERMATOLOGIC: Dermatological changes noted B/L LE.   LLE:  Hyperkeratotic tissue noted to the plantar aspect of the first metatarsal head. Upon debridement of hyperkeratotic tissue, pinpoint superficial ulceration noted with macerated periwound area. Wound does not probe to bone, tunnel, track. Left foot toenails 2-5 discolored yellow and thickend but within normal limits of length. Webspaces left foot 2-4 discolored with betadine orange, macerated, however intact. Diffuse flaky/xerotic tissue noted to B/L LE.    RLE:  Full-thickness ulceration noted to the plantar aspect of the right foot near cuboid region with skin graft substitute overlying surgical sutures intact. Skin graft substitute appears to be well adhered at this time. Macerated tissue noted to the plantar and distal aspect of the wound. No purulent drainage noted. Wound does not probe to bone, tunnel, or track. No fluctuance or crepitus noted. No malodor noted from wound.      Patient provided verbal consent for today's photos. RLE:            LLE:        MUSCULOSKELETAL: Muscle strength is 4/5 for all pedal groups tested. No pain with palpation of b/l LE. Ankle joint ROM is decreased in dorsiflexion with the knee extended. 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        ASSESSMENT  1. S/p I&D with application of stravix graft (DOS 3/29/2021)  2. Superficial ulceration; Left  3. Onychomycosis x 4, Left  4. Macerated webspaces x 3: Left foot  5. Diabetes Mellitus Type II uncontrolled with peripheral neuropathy  6. History of non-compliance with weightbearing status and dressing changes    PLAN  -Evaluation and management x 30 minutes and greater than 50% of the time spent explaining the etiology and treatment with the patient.   -Using a #15 blade, I excisionally debrided the hyperkeratotic left foot wound down to and including dermal tissue. Less than 1cc of bleeding noted. Hemostasis achieved with direct pressure. Patient tolerated well. -Right lower extremity dressing applied consisting of Adaptic, Betadine, gauze, ABD, Kerlix, Ace bandage  -Left lower extremity dressing: betadine to wound, Betadine to the webspaces, gauze, Kerlix Ace bandage  -Instructed patient to continue to perform dressing changes every other day left LE consisting of the dressings described above. Patient understood.  -Instructed patient to leave dressing to right lower extremity clean, dry, intact.  -Patient instructed to be continued strict nonweightbearing to right lower extremity in bilateral CAM boot. Patient understood.  -Return to clinic in 1 week.       Discussed assessment and plan with RAMONE Garcia DPM  Podiatric Resident, PGY-1  Pager #: [phone] or Perfect Serve

## 2021-04-12 NOTE — PATIENT INSTRUCTIONS
Keep right foot dressing on clean and dry. Change left foot dressing daily with betadine. Non weight bearing both feet. Return in 1 weeks.

## 2021-04-12 NOTE — PROGRESS NOTES
Pt left prior to being seen, unable to assess chronic or acute issues. Will be asked to reschedule. Recently seen in our office for pre-op visit at which point general wellness was confirmed. Will need to be rescheduled with PCP within the next month.      Electronically signed by Selma Love MD on 4/12/2021 at 11:06 AM

## 2021-04-12 NOTE — PROGRESS NOTES
Department of Podiatry  Resident Progress Note     Liss Agarwal  Allergies: Sulfa antibiotics     SUBJECTIVE  The patient is a 59 y. o. female who presents to [facility] podiatry clinic s/p I&D and application of Stravix graft (DOS 3/29/2021). Patient reports keeping the dressing clean, dry, intact over the past week. She states she was unable to wear the CAM boot due to her neuropathic pain. She has begun taking higher than the prescribed doses because of the pain, says she takes for four 400 mg pills of gabapentin, says she is only prescribed and instructed to take two 400 mg pills. She denies nausea, vomiting, fever, chills, shortness of breath or chest pain. Patient has no other pedal complaints at this time. Past Medical History:    Past Medical History             Diagnosis Date    Amenorrhea      Anomalies of nails      Asthma 5/14/04    Athlete's foot 8/2010    Bacterial vaginosis 04/2008    Carpal tunnel syndrome 5/2007    COPD (chronic obstructive [facility] disease) [facility]      Diabetes mellitus type II 08/2007    Diabetic neuropathy ([facility] 8/10    DVT (deep venous thrombosis) ([facility] 3/2004    Dyslipidemia 5/2009    Dyspareunia 05/2009    ETOH abuse 3/04/2007    Feet clawing      HTN (hypertension)      Hx of blood clots      Hyperlipidemia      MRSA (methicillin resistant staph aureus) culture positive 11/06/2017; 11/17/2017     foot; leg     Neuropathy 05/2009     polyneuropathy    Pain, back 04/2008    Pain, eye, right 5/14/04    Pancreatitis 5/14/04    Pseudocyst, pancreas 5/14/04    Scalp lesion 08/2007    Tinea pedis      Tobacco abuse 03/2008    Vaginal bleeding, abnormal 6/2007    Wears dentures             Review of Systems: As above     OBJECTIVE  Patient presents to clinic with assistance of a wheelchair, accompanied by family member. She is wearing a CAM boot to bilateral lower extremity.  She is alert and oriented to person, place, and time and appears to x 30 minutes and greater than 50% of the time spent explaining the etiology and treatment with the patient.   -Using a #15 blade, I excisionally debrided the hyperkeratotic left foot wound down to and including dermal tissue. Less than 1cc of bleeding noted. Hemostasis achieved with direct pressure. Patient tolerated well. -Using sterile technique, sutures of right lower extremity were removed without incident. Patient tolerated well. No bleeding noted. -Right lower extremity dressing applied consisting of Betadine, Modesta, gauze, Kerlix, Ace bandage  -Left lower extremity dressing: betadine to wound, Betadine to the webspaces, gauze, Kerlix Ace bandage  -Instructed patient to continue to perform dressing changes every day left LE consisting of the dressings described above. Patient understood.  -Instructed patient to leave dressing to right lower extremity clean, dry, intact.  -Patient instructed to be continued strict nonweightbearing to right lower extremity in bilateral CAM boot. Patient understood.  -Return to clinic in 1 week.       Discussed assessment and plan with Dr. Aminta Spurling, DPM Dola Canton, DPM  Podiatric Resident, PGY-1  Pager #: [phone] or Perfect Serve

## 2021-04-12 NOTE — PROGRESS NOTES
Patient left without being seen. Was seen earlier in Podiatry clinic and noted that she was tired. Daughter with patient. Left message on patient's machine to call and reschedule.

## 2021-04-19 NOTE — PROGRESS NOTES
Department of Podiatry  Resident Progress Note     Gualberto Dael  Allergies: Sulfa antibiotics     SUBJECTIVE  The patient is a 59 y. o. female who [facility] s/p I&D and application of Stravix graft (DOS 3/29/2021). Patient states she change the dressing yesterday after noticed increased drainage. She reports increased swelling to the right lower extremity. She did not reapply the compressive dressing to the right lower extremity. She reports some pain to the lateral aspect of the right foot that she is unable to rate at this time. She denies nausea, vomiting, fever, chills, shortness of breath or chest pain. Patient has no other pedal complaints at this time. Past Medical History:    Past Medical History             Diagnosis Date    Amenorrhea      Anomalies of nails      Asthma 5/14/04    Athlete's foot 8/2010    Bacterial vaginosis 04/2008    Carpal tunnel syndrome 5/2007    COPD (chronic obstructive [facility] disease) [facility]      Diabetes mellitus type II 08/2007    Diabetic neuropathy ([facility] 8/10    DVT (deep venous thrombosis) ([facility] 3/2004    Dyslipidemia 5/2009    Dyspareunia 05/2009    ETOH abuse 3/04/2007    Feet clawing      HTN (hypertension)      Hx of blood clots      Hyperlipidemia      MRSA (methicillin resistant staph aureus) culture positive 11/06/2017; 11/17/2017     foot; leg     Neuropathy 05/2009     polyneuropathy    Pain, back 04/2008    Pain, eye, right 5/14/04    Pancreatitis 5/14/04    Pseudocyst, pancreas 5/14/04    Scalp lesion 08/2007    Tinea pedis      Tobacco abuse 03/2008    Vaginal bleeding, abnormal 6/2007    Wears dentures             Review of Systems: As above     OBJECTIVE  Patient presents to clinic with assistance of a wheelchair, accompanied by family member. She is wearing a CAM boot to bilateral lower extremity.  She is alert and oriented to person, place, and time and appears to be in no acute distress.    VASCULAR: DP and PT pulses are palpable 1/4 b/l. CFT is brisk to the right TMA distal stump site RLE and remaning digits on the left foot. Skin temperature warm to cool from proximal to distal with no focal calor b/l LE. Mild diffuse non-pitting edema noted to left LE, +2 pitting edema noted to right LE.     NEUROLOGIC: Protective sensation is diminished at all pedal sites b/l. Gross and epicritic sensation is diminished b/l.      DERMATOLOGIC: Dermatological changes noted B/L LE.   LLE:  Hyperkeratotic tissue noted to the plantar aspect of first metatarsal head. Upon debridement, pinpoint superficial ulceration noted. Wound does not probe to bone, tunnel, track. Left foot toenails 2-5 discolored yellow and thickend but within normal limits of length. Webspaces left foot 2-4 discolored with betadine orange, macerated, however intact. Diffuse flaky/xerotic tissue noted to B/L LE.    RLE:  Full-thickness ulceration noted to the plantar aspect of the right foot near cuboid region with granular, fibrotic base with fascia exposed. Macerated tissue noted to the plantar and distal aspect of the wound. No purulent drainage noted. Wound does not probe to bone, tunnel, or track. No fluctuance or crepitus noted. No malodor noted from wound.      Patient provided verbal consent for today's photos. RLE:        LLE:        MUSCULOSKELETAL: Muscle strength is 4/5 for all pedal groups tested. No pain with palpation of b/l LE. Ankle joint ROM is decreased in dorsiflexion with the knee extended. 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        ASSESSMENT  1. S/p I&D with application of stravix graft (DOS 3/29/2021)  2. Superficial ulceration; Left  3. Onychomycosis x 4, Left  4. Macerated webspaces x 3: Left foot  5. Diabetes Mellitus Type II uncontrolled with peripheral neuropathy  6.  History of non-compliance with weightbearing status and dressing changes    PLAN  -Evaluation and management x 30 minutes and greater than 50% of the time spent explaining the etiology and treatment with the patient.   -Using a #15 blade, I excisionally debrided the hyperkeratotic left foot wound down to and including dermal tissue. Approximately 10 cc of bleeding noted. Hemostasis was achieved with direct pressure, silver nitrate. Patient tolerated well. -Right lower extremity dressing applied consisting of Betadine, gauze, Kerlix, Ace bandage  -Left lower extremity dressing: betadine to wound, Betadine to the webspaces, gauze, Kerlix, Ace bandage  -Instructed patient to continue to perform dressing changes every day to b/l LE consisting of the dressings described above. Patient understood.  -Had prolonged discussion with patient regarding compliance of compressive dressings, dressing changes, weightbearing status, glycemic control, elevation. Wound has worsened over the last few visits. Patient is at risk for infection, more proximal amputation.  -Patient instructed to be continued strict nonweightbearing to right lower extremity in bilateral CAM boot. Patient understood.  -Return to clinic in 1 week.       Discussed assessment and plan with RAMONE Newell DPM  Podiatric Resident, PGY-1  Pager #: [phone] or Perfect Serve

## 2021-04-19 NOTE — PATIENT INSTRUCTIONS
Wound care instructions:  Please perform every other day dressing changes to the right lower extremity consisting of:  -Apply Modesta to the wound base on above the right foot  -Apply gauze over top of the wound base  -Apply gauze over the front of the ankle and leg  -Gently roll Kerlix covering the foot to just below the knee  -Apply 4 inch Ace bandage with gentle compression from the foot to the ankle  -Apply 6 inch Ace bandage with gentle compression from the ankle to the knee    Perform every other day dressing changes to the left lower extremity consisting the following:  -Applied Betadine to the webspace of the left foot, wound of left foot  -Apply gauze to the wound of the left foot, gauze to the front of foot and ankle  -Gently rolled Kerlix from the base of the toes to just below the knee  -Apply 4 inch Ace bandage with gentle compression from the base of the toes to just with the ankle  -Apply 6 Ace bandage from this with the ankle to just below the knee    -You will be strict nonweightbearing to the right lower extremity  -Please elevate bilateral lower extremities when lying/sitting for extended period of time to aid in edema control. Return to clinic in 1 week.

## 2021-04-26 NOTE — PATIENT INSTRUCTIONS
Please perform daily dressing changes to right lower extremity consisting of:  Betadine around the wound, Modesta to the wound, wound wicking foam over the Modesta, gauze, Kerlix, Ace bandage. Please perform every other day dressing changes to left lower extremity consisting of Betadine to wound, Band-Aid, Ace bandage. Please return to [address] [place] [address] podiatry clinic in 1 week.

## 2021-04-26 NOTE — PROGRESS NOTES
Department of Podiatry  Resident Progress Note     William Hodges  Allergies: Sulfa antibiotics     SUBJECTIVE  The patient is a 59 y. o. female who presents to [facility] outpatient podiatry clinic s/p I&D and application of Stravix graft (DOS 3/29/2021). Patient states she changed the dressing every other day as instructed per last visit. She reports decreased swelling to the right lower extremity since last visit. She denies any increase in pain or drainage. Reports being diabetic with last known glucose of 126 mg/dL. She denies nausea, vomiting, fever, chills, shortness of breath or chest pain. Patient has no other pedal complaints at this time. Past Medical History:    Past Medical History             Diagnosis Date    Amenorrhea      Anomalies of nails      Asthma 5/14/04    Athlete's foot 8/2010    Bacterial vaginosis 04/2008    Carpal tunnel syndrome 5/2007    [facility] obstructive [facility] disease) [facility]      Diabetes mellitus type II 08/2007    Diabetic neuropathy ([facility] 8/10    DVT (deep venous thrombosis) ([facility] 3/2004    Dyslipidemia 5/2009    Dyspareunia 05/2009    ETOH abuse 3/04/2007    Feet clawing      HTN (hypertension)      Hx of blood clots      Hyperlipidemia      MRSA (methicillin resistant staph aureus) culture positive 11/06/2017; 11/17/2017     foot; leg     Neuropathy 05/2009     polyneuropathy    Pain, back 04/2008    Pain, eye, right 5/14/04    Pancreatitis 5/14/04    Pseudocyst, pancreas 5/14/04    Scalp lesion 08/2007    Tinea pedis      Tobacco abuse 03/2008    Vaginal bleeding, abnormal 6/2007    Wears dentures             Review of Systems: As above     OBJECTIVE  Patient presents to clinic with assistance of a wheelchair, accompanied by family member. She is wearing a CAM boot to bilateral lower extremity. She is alert and oriented to person, place, and time and appears to be in no acute distress.     VASCULAR: DP and PT pulses are palpable 1/4 b/l. CFT is brisk to the right TMA distal stump site RLE and remaning digits on the left foot. Skin temperature warm to cool from proximal to distal with no focal calor b/l LE. Mild diffuse non-pitting edema noted to left LE, +1 pitting edema noted to right LE.     NEUROLOGIC: Protective sensation is diminished at all pedal sites b/l. Gross and epicritic sensation is diminished b/l.      DERMATOLOGIC: Dermatological changes noted B/L LE.   LLE:  Hyperkeratotic tissue noted to the plantar aspect of first metatarsal head. Upon debridement, pinpoint superficial ulceration noted. Wound does not probe to bone, tunnel, track. Left foot toenails 2-5 discolored yellow and thickend but within normal limits of length. Webspaces left foot 2-4 discolored with betadine orange, macerated, however intact. Diffuse flaky/xerotic tissue noted to B/L LE.    RLE:  Full-thickness ulceration noted to the plantar aspect of the right foot near cuboid region with granular, fibrotic base with fascia exposed. Macerated tissue noted to the plantar and distal aspect of the wound. No purulent drainage noted. Wound does not probe to bone, tunnel, or track. No fluctuance or crepitus noted. No malodor noted from wound.      Patient provided verbal consent for today's photos. RLE:      LLE:      MUSCULOSKELETAL: Muscle strength is 4/5 for all pedal groups tested. No pain with palpation of b/l LE. Ankle joint ROM is decreased in dorsiflexion with the knee extended. 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        ASSESSMENT  1. S/p I&D with application of stravix graft (DOS 3/29/2021) 2/2 full thickness ulceration, right foot sub cuboid, Carpenter 2  2. Superficial ulceration; Left  3. Onychomycosis x 4, Left  4. Macerated webspaces x 3: Left foot  5. Diabetes Mellitus Type II uncontrolled with peripheral neuropathy  6.  History of non-compliance with weightbearing status and dressing changes    PLAN  -Evaluation and management

## 2021-05-03 NOTE — PROGRESS NOTES
is alert and oriented to person, place, and time and appears to be in no acute distress.     VASCULAR: DP and PT pulses are palpable 1/4 b/l. CFT is brisk to the right TMA distal stump site RLE and remaning digits on the left foot. Skin temperature warm to cool from proximal to distal with no focal calor b/l LE. Mild diffuse non-pitting edema noted to left LE, +1 pitting edema noted to right LE.     NEUROLOGIC: Protective sensation is diminished at all pedal sites b/l. Gross and epicritic sensation is diminished b/l.      DERMATOLOGIC: Dermatological changes noted B/L LE.   LLE:  Hyperkeratotic tissue noted plantar aspect first metatarsal head. Close soft tissue envelope to left lower extremity. Left foot toenails 2-5 discolored yellow and thickend but within normal limits of length. Webspaces left foot 2-4 discolored with betadine orange, macerated, however intact. Diffuse flaky/xerotic tissue noted to B/L LE.    RLE:  Full-thickness ulceration noted to the plantar aspect of the right foot near cuboid region with granular, fibrotic base with macerated periwound. Serous drainage noted. No purulent drainage noted. Wound does not probe to bone, tunnel, or track. No fluctuance or crepitus noted. No malodor noted from wound.      Patient provided verbal consent for today's photos. RLE:      LLE:      MUSCULOSKELETAL: Muscle strength is 4/5 for all pedal groups tested. No pain with palpation of b/l LE. Ankle joint ROM is decreased in dorsiflexion with the knee extended. 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        ASSESSMENT  1. Full-thickness ulceration; Carpenter 2; right lower extremity  2. Onychomycosis x 4, Left  3. Macerated webspaces x 3: Left foot  4. Diabetes Mellitus Type II uncontrolled with peripheral neuropathy  5.  History of non-compliance with weightbearing status and dressing changes    PLAN  -Evaluation and management x 30 minutes and greater than 50% of the time spent explaining the etiology and treatment with the patient.   -Using a #15 blade, I excisionally debrided the hyperkeratotic right foot wound down to and including muscle. Less than 10 cc of bleeding noted. Hemostasis was achieved with direct pressure. Patient tolerated well. -Right lower extremity dressing applied consisting of betadine, wound wicking foam, gauze, Kerlix, Ace bandage  -Left lower extremity dressing: Ace bandage  -Instructed patient to continue to perform dressing changes day to b/l LE consisting of the dressings described above. Patient understood.  -Patient would benefit from total contact cast of the right lower extremity, however given her current amount of edema drainage, macerated tissue she is a poor candidate at this time. Once edema, drainage, maceration is better controlled, will reevaluate for total contact cast.  -Patient instructed to be continued strict nonweightbearing to right lower extremity in bilateral CAM boot. Patient understood.  -Return to clinic in 1 week.       Discussed assessment and plan with Dr. Jenette Phalen, DPM Modesto Grieves, DPM  Podiatric Resident, PGY-1  Pager #: [phone] or Perfect Serve

## 2021-05-10 NOTE — TELEPHONE ENCOUNTER
Last OV - 04/12/2021 - Dr. Carlos Chol  Next OV- 05/17/2021      Metoprolol succinate last filled   10/08/2020 - 1 rf (90 day)

## 2021-05-17 NOTE — PROGRESS NOTES
by family member. She is wearing a CAM boot to bilateral lower extremity. She is alert and oriented to person, place, and time and appears to be in no acute distress.     VASCULAR: DP and PT pulses are palpable 1/4 b/l. CFT is brisk to the right TMA distal stump site RLE and remaning digits on the left foot. Skin temperature warm to cool from proximal to distal with no focal calor b/l LE. Mild diffuse non-pitting edema noted to left LE, +2 pitting edema noted to right LE.     NEUROLOGIC: Protective sensation is diminished at all pedal sites b/l. Gross and epicritic sensation is diminished b/l.      DERMATOLOGIC: Dermatological changes noted B/L LE.   LLE:  Hyperkeratotic tissue noted plantar aspect first metatarsal head. Close soft tissue envelope to left lower extremity. Left foot toenails 3-5 discolored yellow, thickened and elongated. Left foot 2nd toenail is absent, with healthy granular nail bed present. Webspaces left foot 2-4 discolored with betadine orange, macerated, however intact. Diffuse flaky/xerotic tissue noted to B/L LE.    RLE:  Full-thickness ulceration noted to the plantar aspect of the right foot near cuboid region with granular, fibrotic base with macerated periwound. Serous drainage noted. No purulent drainage noted. Wound does not probe to bone, tunnel, or track. No fluctuance or crepitus noted. No malodor noted from wound.    Patient provided verbal consent for today's photos. RLE:      LLE:        MUSCULOSKELETAL: Muscle strength is 4/5 for all pedal groups tested. No pain with palpation of b/l LE. Ankle joint ROM is decreased in dorsiflexion with the knee extended. 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        ASSESSMENT  1. Full-thickness ulceration; Carpenter 2; right lower extremity  2. Onychomycosis x 3, Left  3. Macerated webspaces x 3: Left foot  4. Diabetes Mellitus Type II uncontrolled with peripheral neuropathy  5.  History of non-compliance with weightbearing status and dressing changes    PLAN  -Evaluation and management x 30 minutes and greater than 50% of the time spent explaining the etiology and treatment with the patient.   -Using a #15 blade, I excisionally debrided the hyperkeratotic right foot wound down to and including muscle. Less than 10 cc of bleeding noted. Hemostasis was achieved with direct pressure. Patient tolerated well. -Using sterile nail nippers, I debrided the toenails 3-5 of left LE in thickness and length without incidence. Patient tolerated well. -Right lower extremity dressing applied consisting of betadine, wound wicking foam, gauze, Kerlix, Ace bandage  -Left lower extremity dressing: betadine to webspaces, antibiotic ointment and band-aid to left 2nd digit, Ace bandage  -Instructed patient to continue to perform dressing changes day to b/l LE consisting of the dressings described above. Patient understood.  -Ordered venous duplex doppler of right LE, will follow up results.   -Patient instructed to be continued strict nonweightbearing to right lower extremity in bilateral CAM boot. Patient understood.  -Return to clinic in 1 week.       Discussed assessment and plan with RAMONE Garcia DPM  Podiatric Resident, PGY-1  Pager #: [phone] or Perfect Serve

## 2021-05-17 NOTE — PATIENT INSTRUCTIONS
Please change dressing daily to the right leg consisting of wicking foam dressing, gauze, kerlix, ACE bandage. Elevate bilateral lower extremities when lying/sitting for extended periods of time. Nonweightbearing to the right lower extremity. Change dressing to the left leg consisting of antibiotic ointment and band-aid to left 2nd toe nail and ACE bandage. Return to clinic in 1 week.

## 2021-05-24 NOTE — PROGRESS NOTES
Nursing Assessment:  Patient states she is non-wt-bearing; in wheelchair. daughter assists patient. Pain: States 3 days ago for a couple of days pain level was 10/10; past couple of days pain subsiding and now 0/10  denies fever. Skin Integrity: no open wounds bilateral legs. Open wound with dressing right foot.; Right leg is edematous and skin taut around right knee. Blood sugar 67; given soda. Patient states she takes 50 units of BAsaglar at hs and sliding scale with Novolog insulin. Will be seen now in [address] [place] [address] to address swelling right leg.

## 2021-05-24 NOTE — PATIENT INSTRUCTIONS
Please obtain a blood pressure cuff from [employer] to check your blood pressure on a daily basis and record it in the log    Please take your Cardura 4 mg twice a day instead of daily for better blood pressure control    Please follow-up in the clinic in 2 weeks

## 2021-05-24 NOTE — PATIENT INSTRUCTIONS
Antibiotic Augmentin ordered. take as instructed. Daily dressing changes as previous. MRI right foot prior to next visit.   Return in two week

## 2021-05-24 NOTE — PROGRESS NOTES
Outpatient Clinic Established Patient Note    Patient: Dot Todd  : 1965 (60 y.o.)  Date: 2021    CC: Bilateral thigh edema    HPI:      Patient is a 49-year-old female with past medical history of hypertension, CKD 4, T2DM, DVT who presented to the clinic after her visit with podiatry for bilateral thigh edema. Patient endorses for the past 2 weeks her edema has been getting worse. Patient takes Lasix 20 mg daily at home but endorses she continues to have peripheral edema. While at the clinic, patient's BP was also also elevated to the 190s. Patient takes Cardura and metoprolol at home. Patient also follows up with Dr. Trisha Lagos who addresses her blood pressure medications as well as her CKD. Home Meds:  Prior to Visit Medications    Medication Sig Taking? Authorizing Provider   amoxicillin-clavulanate (AUGMENTIN) 875-125 MG per tablet Take 1 tablet by mouth 2 times daily for 10 days  Nat García DPM   escitalopram (LEXAPRO) 10 MG tablet TAKE 1 TABLET BY MOUTH DAILY  Harry Schilder, MD   ELIQUIS 5 MG TABS tablet TAKE 1 TABLET BY MOUTH TWICE DAILY  Harry Schilder, MD   atorvastatin (LIPITOR) 20 MG tablet Take 1 tablet by mouth nightly  Quincy Orta MD   metoprolol succinate (TOPROL XL) 50 MG extended release tablet TAKE 1 TABLET BY MOUTH EVERY NIGHT  Malcolm Garrett MD   gabapentin (NEURONTIN) 400 MG capsule Take 1 capsule by mouth 2 times daily for 90 days. Quincy Orta MD   furosemide (LASIX) 20 MG tablet Take 1 tablet by mouth daily  Quincy Orta MD   ammonium lactate (LAC-HYDRIN) 12 % lotion Apply topically daily.   Jennifer Holguin MD   amitriptyline (ELAVIL) 100 MG tablet TAKE 1 TABLET BY MOUTH EVERY NIGHT AT BEDTIME  Harry Schilder, MD   doxazosin (CARDURA) 4 MG tablet Take 1 tablet by mouth daily  Quincy Orta MD   omeprazole (PRILOSEC) 20 MG delayed release capsule Take 1 capsule by mouth every morning  Quincy Orta MD   insulin glargine (Daley Mcdaniel KWIKPEN) 100 UNIT/ML injection pen Inject 50 Units into the skin daily  Ashley Amador MD   insulin aspart (NOVOLOG FLEXPEN) 100 UNIT/ML injection pen Inject 8 Units into the skin 3 times daily (before meals)  Patient taking differently: Inject 8 Units into the skin 3 times daily (before meals) Indications: states follows sliding scale   Virginia Alejandro MD   nystatin (MYCOSTATIN) 599406 UNIT/GM powder Apply topically 2 times daily Apply to right breast and groin area BID  Darryl Osuna MD   aspirin EC 81 MG EC tablet Take 1 tablet by mouth daily  Darryl Osuna MD   Accu-Chek Softclix Lancets MISC 1 strip by Does not apply route 2 times daily Check before breakfast and before going to bed  Darryl Osuna MD   blood glucose test strips (TRUE METRIX BLOOD GLUCOSE TEST) strip 1 each by In Vitro route 2 times daily As needed.   Darryl Osuna MD   Insulin Pen Needle 31G X 5 MM MISC 1 each by Does not apply route daily  Darryl Osuna MD   Lancets MISC 1 each by Does not apply route 2 times daily PHARMACY MAY SUBSTITUTE TO TRUE METRIX LANCETS  Darryl Osuna MD   albuterol sulfate  (90 Base) MCG/ACT inhaler Inhale 2 puffs into the lungs every 6 hours as needed for Wheezing  Patient taking differently: Inhale 2 puffs into the lungs every 6 hours as needed for Wheezing   Alejandro Ritter MD   METHADONE HCL PO Take 140 mg by mouth Take whole bottle every AM  Comes from methadone clinic  Historical Provider, MD   Gauze Pads & Dressings MISC Please dispense 4x8 guazebrianlix, and ace  Leonardo Dash DPM       Allergies:    Sulfa antibiotics    Health Maintenance Due   Topic Date Due    COVID-19 Vaccine (1) Never done    Hepatitis B vaccine (1 of 3 - Risk 3-dose series) Never done    Shingles Vaccine (1 of 2) Never done    Diabetic retinal exam  08/28/2016    Pneumococcal 0-64 years Vaccine (2 of 4 - PCV13) 11/13/2016    Breast cancer screen  09/10/2017    Low dose CT lung screening  07/22/2018    Cervical cancer screen  04/15/2019    Annual Wellness Visit (AWV)  Never done    Diabetic foot exam  06/09/2020    Colon Cancer Screen FIT/FOBT  06/10/2020    Lipid screen  10/07/2020       Immunization History   Administered Date(s) Administered    Influenza 11/08/2011    Influenza Virus Vaccine 09/12/2014, 10/16/2015, 10/27/2017    Influenza, Kal Locker, 6 mo and older, IM, PF (Flulaval, Fluarix) 01/05/2019    Influenza, Quadv, IM, PF (6 mo and older Fluzone, Flulaval, Fluarix, and 3 yrs and older Afluria) 11/10/2016, 10/03/2019    Pneumococcal Polysaccharide ([id number]) 11/13/2015    Tdap (Boostrix, Adacel) 07/22/2014       Review of Systems   Constitutional: Negative for activity change, appetite change, chills and fever. Patient is wheelchair-bound   Respiratory: Negative for cough, choking, chest tightness and shortness of breath. Cardiovascular: Negative for chest pain, palpitations and leg swelling. Gastrointestinal: Negative for blood in stool, constipation and diarrhea. Endocrine: Negative for cold intolerance, heat intolerance, polydipsia and polyphagia. Genitourinary: Negative for dysuria. Psychiatric/Behavioral: Negative for behavioral problems and hallucinations. Data: Old records have been reviewed electronically. PHYSICAL EXAM:  BP (!) 187/77 (Site: Right Upper Arm, Position: Sitting, Cuff Size: Large Adult)   Pulse 80   Temp 98.7 °F (37.1 °C) (Temporal)   Resp 20   LMP 10/23/2015   SpO2 96%   Physical Exam  Constitutional:       Comments: Patient is wheelchair-bound. HENT:      Head: Normocephalic and atraumatic. Eyes:      Extraocular Movements: Extraocular movements intact. Pupils: Pupils are equal, round, and reactive to light. Cardiovascular:      Rate and Rhythm: Regular rhythm. Pulses: Normal pulses. Heart sounds: Normal heart sounds. No murmur heard. No friction rub. No gallop. Pulmonary:      Effort: Pulmonary effort is normal. No respiratory distress. Breath sounds: Normal breath sounds. No wheezing or rales. Abdominal:      General: Bowel sounds are normal. There is no distension. Palpations: Abdomen is soft. Tenderness: There is no abdominal tenderness. There is no guarding. Musculoskeletal:      Right lower leg: Edema present. Left lower leg: Edema present. Comments: Thigh edema worse on the right as opposed to the left  Bilateral lower extremity wrapped with Ace wraps   Neurological:      Mental Status: She is alert and oriented to person, place, and time. Psychiatric:         Behavior: Behavior normal.         Assessment & Plan:      1. Hypertension  At the clinic today, patient's blood pressure was elevated to the 190s. Patient at home takes Cardura, metoprolol at home. Patient also follows up with nephrology who adjusted her antihypertensives. Patient was recently taken off losartan due to her creatinine.  -Increase Cardura to 4 mg twice daily  -Patient instructed to obtain a blood pressure cuff and record blood pressures on a log    2. Type 2 diabetes mellitus with foot ulcer, with long-term current use of insulin ([address]  Patient endorses her glucose has been under better control. Her blood glucose ranges in the 120s. -Hemoglobin A1c improved from 8.8 to 7.6 today     3. Stage 4 chronic kidney disease  Follows up with nephrology. Will be seeing nephrology on June 17     4. Hx DVT  - has had 2 DVTs in past  - continues to take eliquis, no bleeding     Dispo: Pt has been staffed with Dr. Charlene Ortiz  _______________  Gregor Romero MD, 5/24/2021 9:27 AM   PGY-1    Addendum to Resident H& P/Progress note:  I have personally seen,examined and evaluated the patient.  I have reviewed the current history, physical findings, labs and assessment and plan and agree with note as documented by resident MD ( Roxanne Infante)      Klaudia Baltazar MD, [address]

## 2021-05-24 NOTE — PROGRESS NOTES
Department of Podiatry  Resident Progress Note     Jessy Farley  Allergies: Sulfa antibiotics     SUBJECTIVE  The patient is a 59 y. o. female who presents to [facility] outpatient podiatry clinic for right foot wound. Patient reports feeling well overall this past week but has noticed increased swelling and redness to her right foot. Denies purulent drainage from wound. She states the dressing was changed daily consisting of betadine, gauze, kerlix, ACE bandage. She endorses compliance with her nonweight bearing status. The patient is diabetic but is unsure of her last blood glucose. She denies nausea, vomiting, fever, chills, shortness of breath or chest pain. Past Medical History:    Past Medical History             Diagnosis Date    Amenorrhea      Anomalies of nails      Asthma 5/14/04    Athlete's foot 8/2010    Bacterial vaginosis 04/2008    Carpal tunnel syndrome 5/2007    COPD (ch[facility] obstructive [facility] disease) [facility] [address]      Diabetes mellitus type II 08/2007    Diabetic neuropathy ([facility] [address] 8/10    DVT (deep venous thrombosis) ([facility] [address] 3/2004    Dyslipidemia 5/2009    Dyspareunia 05/2009    ETOH abuse 3/04/2007    Feet clawing      HTN (hypertension)      Hx of blood clots      Hyperlipidemia      MRSA (methicillin resistant staph aureus) culture positive 11/06/2017; 11/17/2017     foot; leg     Neuropathy 05/2009     polyneuropathy    Pain, back 04/2008    Pain, eye, right 5/14/04    Pancreatitis 5/14/04    Pseudocyst, pancreas 5/14/04    Scalp lesion 08/2007    Tinea pedis      Tobacco abuse 03/2008    Vaginal bleeding, abnormal 6/2007    Wears dentures             Review of Systems: As above     OBJECTIVE  Patient presents to clinic with assistance of a wheelchair, accompanied by family member. She is wearing a CAM boot to bilateral lower extremity.  She is alert and oriented to person, place, and time and appears to be in no acute distress.     VASCULAR: DP and PT pulses are palpable 1/4 b/l. CFT is brisk to the right TMA distal stump site RLE and remaning digits on the left foot. Skin temperature warm to cool from proximal to distal with scant focal calor noted to dorsal aspect right foot and abraham-wound right foot. +2 pitting edema noted to b/l LE.     NEUROLOGIC: Protective sensation is diminished at all pedal sites b/l. Gross and epicritic sensation is diminished b/l.      DERMATOLOGIC: Dermatological changes noted B/L LE.   LLE:  Hyperkeratotic tissue noted plantar aspect first metatarsal head. Close soft tissue envelope to left lower extremity. Left foot toenails 3-5 discolored yellow, but within limits on length and thickness. Left foot 2nd toenail is absent, with healthy granular nail bed present. Webspaces left foot 2-4 discolored with betadine orange, macerated, however intact. Diffuse flaky/xerotic tissue noted to B/L LE.    RLE:  Full-thickness ulceration noted to the plantar aspect of the right foot near cuboid region with granular, fibrotic base with macerated periwound, measures 4.6cm x 3.1cm x 0.2cm. Scant erythema noted dorsal right foot and periwound area. Serous drainage noted. No purulent drainage noted. Bluish discoloration noted to periwound 2/2 wound wicking foam pad. Wound does not probe to bone, tunnel, or track. No fluctuance or crepitus noted. No malodor noted from wound.    Patient provided verbal consent for today's photos. RLE:        LLE:        MUSCULOSKELETAL: Muscle strength is 4/5 for all pedal groups tested. No pain with palpation of b/l LE. Ankle joint ROM is decreased in dorsiflexion with the knee extended. 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        ASSESSMENT  1. Full-thickness ulceration; Carpenter 2; right lower extremity  2. Onychomycosis x 3, Left  3. Macerated webspaces x 3: Left foot  4. Diabetes Mellitus Type II uncontrolled with peripheral neuropathy  5.  History of non-compliance with weightbearing status

## 2021-06-14 NOTE — PROGRESS NOTES
Department of Podiatry  Resident Progress Note     Dimple Suresh  Allergies: Sulfa antibiotics     SUBJECTIVE  The patient is a 59 y. o. female who presents to [facility] outpatient podiatry clinic for right foot wound. Patient reports feeling well overall this past few weeks, with decreased swelling and redness to right leg. Denies purulent drainage from wound. She states the dressing was changed daily consisting of betadine, gauze, kerlix, ACE bandage. She endorses compliance with her nonweight bearing status. The patient is diabetic but is unsure of her last blood glucose. She denies nausea, vomiting, fever, chills, shortness of breath or chest pain. Past Medical History:    Past Medical History             Diagnosis Date    Amenorrhea      Anomalies of nails      Asthma 5/14/04    Athlete's foot 8/2010    Bacterial vaginosis 04/2008    Carpal tunnel syndrome 5/2007    COPD (ch[facility] obstructive [facility] disease) ([facility] [address]      Diabetes mellitus type II 08/2007    Diabetic neuropathy ([facility] [address] 8/10    DVT (deep venous thrombosis) ([facility] [address] 3/2004    Dyslipidemia 5/2009    Dyspareunia 05/2009    ETOH abuse 3/04/2007    Feet clawing      HTN (hypertension)      Hx of blood clots      Hyperlipidemia      MRSA (methicillin resistant staph aureus) culture positive 11/06/2017; 11/17/2017     foot; leg     Neuropathy 05/2009     polyneuropathy    Pain, back 04/2008    Pain, eye, right 5/14/04    Pancreatitis 5/14/04    Pseudocyst, pancreas 5/14/04    Scalp lesion 08/2007    Tinea pedis      Tobacco abuse 03/2008    Vaginal bleeding, abnormal 6/2007    Wears dentures             Review of Systems: As above     OBJECTIVE  Patient presents to clinic with assistance of a wheelchair, accompanied by family member. She is wearing a CAM boot to bilateral lower extremity.  She is alert and oriented to person, place, and time and appears to be in no acute distress.     VASCULAR: DP and PT pulses are palpable 1/4 b/l. CFT is brisk to the right TMA distal stump site RLE and remaning digits on the left foot. Skin temperature warm to cool from proximal to distal with no focal calor noted. +2 pitting edema noted to b/l LE.     NEUROLOGIC: Protective sensation is diminished at all pedal sites b/l. Gross and epicritic sensation is diminished b/l.      DERMATOLOGIC: Dermatological changes noted B/L LE.   LLE:  Hyperkeratotic tissue noted plantar aspect first metatarsal head. Close soft tissue envelope to left lower extremity. Left foot toenails 3-5 discolored yellow, but within limits on length and thickness. Left foot 2nd toenail is absent, with healthy granular nail bed present. Webspaces left foot 2-4 discolored with betadine orange, macerated, however intact. Diffuse flaky/xerotic tissue noted to B/L LE.    RLE:  Full-thickness ulceration noted to the plantar aspect of the right foot near cuboid region with granular, fibrotic base with macerated periwound, measures 4.1cm x 3.2cm x 0.2cm. No erythema noted. Serous drainage noted. No purulent drainage noted. Bluish discoloration noted to periwound 2/2 wound wicking foam pad. Wound does not probe to bone, tunnel, or track. No fluctuance or crepitus noted. No malodor noted from wound.    Patient provided verbal consent for today's photos. RLE:        MUSCULOSKELETAL: Muscle strength is 4/5 for all pedal groups tested. No pain with palpation of b/l LE. Ankle joint ROM is decreased in dorsiflexion with the knee extended. 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        ASSESSMENT  1. Full-thickness ulceration; Carpenter 2; right lower extremity  2. Onychomycosis x 3, Left  3. Macerated webspaces x 3: Left foot  4. Diabetes Mellitus Type II uncontrolled with peripheral neuropathy  5.  History of non-compliance with weightbearing status and dressing changes    PLAN  -Evaluation and management x 30 minutes and greater than 50% of the time spent explaining the etiology and treatment with the patient.   -Using a #15 blade, I excisionally debrided the hyperkeratotic right foot wound down to and including muscle. Less than 10 cc of bleeding noted. Hemostasis was achieved with direct pressure. Patient tolerated well. -Right lower extremity dressing applied consisting of betadine, wound wicking foam, gauze, Kerlix, Ace bandage  -Left lower extremity dressing: betadine to webspaces, Ace bandage  -Instructed patient to continue to perform dressing changes daily b/l LE consisting of the dressings described above. Patient understood.  -Ordered venous duplex doppler of right LE, will follow up results.   -Ordered MRI right foot  -Patient instructed to be continued strict nonweightbearing to right lower extremity in bilateral CAM boot. Patient understood.  -Return to clinic in 1 week.       Discussed assessment and plan with RAMONE Dean DPM  06/14/21  11:58 AM

## 2021-06-14 NOTE — TELEPHONE ENCOUNTER
Refill Amitriptyline  Last OV 5-24-21 [place]  Next appt. 6-21-21 for Podiatry/not scheduled with medical

## 2021-06-17 NOTE — ED PROVIDER NOTES
[address] [facility]          ATTENDING PHYSICIAN NOTE       Date of evaluation: 2021    Chief Complaint     Abdominal Pain, Groin Swelling, Other (Breast swelling), and Bloated      History of Present Illness     Nicol Uribe is a 60 y.o. female who presents that presents to the above chief complaint. Was seen by nephrology today and physician concern for abdominal wall edema with possible cellulitis. She was told to come to the emergency department. Patient states that she fell onto her gluteal area 1 week ago and her insides have been right since with pain and swelling. Family member in room concern for infection in her lower abdomen. Review of Systems     Review of SystemsDenied any or chills shortness of breath. Denies any UTI or URI symptoms. Otherwise negative review of systems reported by patient    Past Medical, Surgical, Family, and Social History     She has a past medical history of Amenorrhea, Anomalies of nails, Asthma, Athlete's foot, Bacterial vaginosis, Carpal tunnel syndrome, COPD (chronic obstructive pulmonary disease) ([address] Diabetes mellitus type II, Diabetic neuropathy ([facility] [address] DVT (deep venous thrombosis) ([facility] [address] Dyslipidemia, Dyspareunia, ETOH abuse, Feet clawing, HTN (hypertension), Hx of blood clots, Hyperlipidemia, MRSA (methicillin resistant staph aureus) culture positive, Neuropathy, Pain, back, Pain, eye, right, Pancreatitis, Pseudocyst, pancreas, Scalp lesion, Tinea pedis, Tobacco abuse, Uses walker, Uses wheelchair, Vaginal bleeding, abnormal, and Wears dentures. She has a past surgical history that includes  section (unknown); pre-malignant / benign skin lesion excision (7003); other surgical history (Left, 2016); Toe amputation (Left, 2017); other surgical history (Right, 10/20/2017); other surgical history (Right, 2018); pr debridement, skin, sub-q tissue,muscle,bone,=<20 sq cm (Right, 2018);  Foot Debridement (Right, 1/7/2019); Foot Debridement (Right, 6/6/2019); Foot Debridement (Right, 7/5/2019); Foot Debridement (Left, 10/23/2019); Tonsillectomy; knee surgery (Left); and Foot Debridement (Right, 3/29/2021). Her family history is not on file. She reports that she quit smoking about 3 years ago. Her smoking use included cigarettes. She has a 30.00 pack-year smoking history. She has never used smokeless tobacco. She reports that she does not drink alcohol and does not use drugs. Medications     Previous Medications    ACCU-CHEK SOFTCLIX LANCETS MISC    1 strip by Does not apply route 2 times daily Check before breakfast and before going to bed    ALBUTEROL SULFATE  (90 BASE) MCG/ACT INHALER    Inhale 2 puffs into the lungs every 6 hours as needed for Wheezing    AMITRIPTYLINE (ELAVIL) 100 MG TABLET    TAKE 1 TABLET BY MOUTH EVERY NIGHT AT BEDTIME    AMMONIUM LACTATE (LAC-HYDRIN) 12 % LOTION    Apply topically daily. ASPIRIN EC 81 MG EC TABLET    Take 1 tablet by mouth daily    ATORVASTATIN (LIPITOR) 20 MG TABLET    Take 1 tablet by mouth nightly    BLOOD GLUCOSE TEST STRIPS (TRUE METRIX BLOOD GLUCOSE TEST) STRIP    1 each by In Vitro route 2 times daily As needed. DOXAZOSIN (CARDURA) 4 MG TABLET    Take 1 tablet by mouth 2 times daily    ELIQUIS 5 MG TABS TABLET    TAKE 1 TABLET BY MOUTH TWICE DAILY    ESCITALOPRAM (LEXAPRO) 10 MG TABLET    TAKE 1 TABLET BY MOUTH DAILY    FUROSEMIDE (LASIX) 20 MG TABLET    Take 1 tablet by mouth daily    GABAPENTIN (NEURONTIN) 400 MG CAPSULE    Take 1 capsule by mouth 2 times daily for 90 days.     GAUZE PADS & DRESSINGS MISC    Please dispense 4x8 guaze, kerlix, and ace    INSULIN ASPART (NOVOLOG FLEXPEN) 100 UNIT/ML INJECTION PEN    Inject 8 Units into the skin 3 times daily (before meals)    INSULIN GLARGINE (BASAGLAR KWIKPEN) 100 UNIT/ML INJECTION PEN    Inject 50 Units into the skin daily    INSULIN PEN NEEDLE 31G X 5 MM MISC    1 each by Does not apply route daily    LANCETS MISC    1 each by Does not apply route 2 times daily PHARMACY MAY SUBSTITUTE TO TRUE METRIX LANCETS    METHADONE HCL PO    Take 140 mg by mouth Take whole bottle every AM  Comes from methadone clinic    METOPROLOL SUCCINATE (TOPROL XL) 100 MG EXTENDED RELEASE TABLET    Take 1 tablet by mouth daily    NYSTATIN (MYCOSTATIN) 088505 UNIT/GM POWDER    Apply topically 2 times daily Apply to right breast and groin area BID    OMEPRAZOLE (PRILOSEC) 20 MG DELAYED RELEASE CAPSULE    Take 1 capsule by mouth every morning       Allergies     She is allergic to sulfa antibiotics. Physical Exam     INITIAL VITALS: BP: 131/69, Temp: 98.2 °F (36.8 °C), Pulse: 61, Resp: 18, SpO2: 94 %   Physical Exam  Vitals and nursing note reviewed. Constitutional:       General: She is not in acute distress. Appearance: She is well-developed. She is not diaphoretic. HENT:      Head: Normocephalic and atraumatic. Eyes:      Conjunctiva/sclera: Conjunctivae normal.      Pupils: Pupils are equal, round, and reactive to light. Cardiovascular:      Rate and Rhythm: Normal rate and regular rhythm. Pulmonary:      Effort: Pulmonary effort is normal. No respiratory distress. Abdominal:      Palpations: Abdomen is soft. Tenderness: There is no guarding or rebound. Musculoskeletal:         General: Normal range of motion. Cervical back: Normal range of motion. Skin:     General: Skin is warm and dry. Comments: She is significant erythema beneath her breast and underneath her abdominal folds of her lower abdomen and bilateral inguinal areas toward her vulva. No crepitus. Neurological:      Mental Status: She is alert and oriented to person, place, and time. Diagnostic Results         RADIOLOGY:  XR CHEST PORTABLE   Final Result      1. No acute disease. CT ABDOMEN PELVIS WO CONTRAST Additional Contrast? None   Final Result   1.   Skin and subcutaneous thickening/infiltration most prominently noted of the anterior abdominal wall. Clinical correlation is recommended for cellulitis. No drainable or organized fluid collection. 2. Findings agree representing pancreatic pseudocysts or other benign entity. This is unchanged in size when compared with the prior study of 2016.   3. Lytic lesion of the L2 vertebral body which is new from the prior study. Metastasis or myeloma are favored entities, although this is indeterminant. MRI of the lumbar spine without and with contrast may be beneficial for further characterization. 4. Bilateral inguinal lymphadenopathy. This may be reactive. 5. Cholelithiasis.               LABS:   Results for orders placed or performed during the hospital encounter of 06/17/21   CBC auto differential   Result Value Ref Range    WBC 8.0 4.0 - 11.0 K/uL    RBC 2.90 (L) 4.00 - 5.20 M/uL    Hemoglobin 7.2 (L) 12.0 - 16.0 g/dL    Hematocrit 23.4 (L) 36.0 - 48.0 %    MCV 80.5 80.0 - 100.0 fL    MCH 25.0 (L) 26.0 - 34.0 pg    MCHC 31.0 31.0 - 36.0 g/dL    RDW 17.1 (H) 12.4 - 15.4 %    Platelets 415 556 - 586 K/uL    MPV 8.1 5.0 - 10.5 fL    Neutrophils % 73.6 %    Lymphocytes % 18.1 %    Monocytes % 6.2 %    Eosinophils % 1.8 %    Basophils % 0.3 %    Neutrophils Absolute 5.9 1.7 - 7.7 K/uL    Lymphocytes Absolute 1.5 1.0 - 5.1 K/uL    Monocytes Absolute 0.5 0.0 - 1.3 K/uL    Eosinophils Absolute 0.1 0.0 - 0.6 K/uL    Basophils Absolute 0.0 0.0 - 0.2 K/uL   Comprehensive Metabolic Panel w/ Reflex to MG   Result Value Ref Range    Sodium 140 136 - 145 mmol/L    Potassium reflex Magnesium 5.1 3.5 - 5.1 mmol/L    Chloride 109 99 - 110 mmol/L    CO2 23 21 - 32 mmol/L    Anion Gap 8 3 - 16    Glucose 46 (LL) 70 - 99 mg/dL    BUN 37 (H) 7 - 20 mg/dL    CREATININE 2.3 (H) 0.6 - 1.1 mg/dL    GFR Non-African American 22 (A) >60    GFR  26 (A) >60    Calcium 8.2 (L) 8.3 - 10.6 mg/dL    Total Protein 6.5 6.4 - 8.2 g/dL    Albumin 2.9 (L) 3.4 - 5.0 g/dL Albumin/Globulin Ratio 0.8 (L) 1.1 - 2.2    Total Bilirubin <0.2 0.0 - 1.0 mg/dL    Alkaline Phosphatase 149 (H) 40 - 129 U/L    ALT 11 10 - 40 U/L    AST 16 15 - 37 U/L    Globulin 3.6 g/dL   Lipase   Result Value Ref Range    Lipase 9.0 (L) 13.0 - 60.0 U/L       ED BEDSIDE ULTRASOUND:      RECENT VITALS:  BP: 131/69,Temp: 98.2 °F (36.8 °C), Pulse: 61, Resp: 18, SpO2: 94 %     Procedures         ED Course     Nursing Notes, Past Medical Hx, Past Surgical Hx, Social Hx,Allergies, and Family Hx were reviewed. patient was given the following medications:  Orders Placed This Encounter   Medications    dextrose 50 % IV solution    dextrose 50 % solution     Jalen Vogel: cabinet override    piperacillin-tazobactam (ZOSYN) 3,375 mg in dextrose 5 % 100 mL IVPB (mini-bag)     Order Specific Question:   Antimicrobial Indications     Answer:   Skin and Soft Tissue Infection    vancomycin (VANCOCIN) 1,500 mg in dextrose 5 % 250 mL IVPB     Order Specific Question:   Antimicrobial Indications     Answer:   Skin and Soft Tissue Infection       CONSULTS:  PHARMACY TO DOSE VANCOMYCIN  IP CONSULT TO HOSPITALIST    MEDICAL DECISIONMAKING / ASSESSMENT / Mace Brandon is a 60 y.o. female patient was worked up for above complaint. Found to have probable cellulitis of abdominal wall. It does look fungal in nature however given her status of renal insufficiency along with diabetes I ordered 2 blood cultures vancomycin and Zosyn to cover anaerobes and gram positives. Did not have a pseudomonal disorder to it. She used IV medication and case will be discussed with hospitalist for antibiotics for cellulitis. I also have pharmacy dose vancomycin. She had an episode on initial renal profile of a blood sugar of 46 which she received D50 for. Patient was reexamined and there was a slight smell through the wound and Zosyn and Vanco were ordered for the above reasons.   I discussed the case with hospitalist and also

## 2021-06-17 NOTE — ED NOTES
Bed: B25-25  Expected date:   Expected time:   Means of arrival:   Comments:  95 Tiff Gonzalez RN  06/17/21 [id number]

## 2021-06-17 NOTE — CONSULTS
Clinical Pharmacy Consult Note       Pharmacy consulted by Dr. Uri Yeager in ED to order first dose of vancomycin. Indication :   SSTI    Patient Data:     Recent Labs     06/17/21  1531      K 5.1      CO2 23   BUN 37*   CREATININE 2.3*       Estimated Creatinine Clearance: 29 mL/min (A) (based on SCr of 2.3 mg/dL (H)). Recent Labs     06/17/21  1531   WBC 8.0   HGB 7.2*   HCT 23.4*   MCV 80.5          Height:  5' 2\" (157.5 cm)  Weight: 203 lb (92.1 kg)    Plan: Will order vancomycin 1.5g IV x 1 dose, to be administered in ED. Please note this consult covers ED vancomycin dose only. If admitting provider would like further vancomycin dose management by pharmacy, please place additional consult order. Thank you for the consult. Please call with questions.   Myriam Leon, PharmD, BCPS  Main pharmacy: [id number]  6/17/2021 5:04 PM

## 2021-06-17 NOTE — H&P
collection. Lytic lesion of the L2 vertebral body which is new from prior study. Bilateral inguinal lymphadenopathy. Cholelithiasis.     Past Medical History:          Diagnosis Date    Amenorrhea     Anomalies of nails     Asthma 04    Athlete's foot 2010    Bacterial vaginosis 2008    Carpal tunnel syndrome 2007    [facility] disease[facility]     Diabetes mellitus type II 2007    10/1/20 pt states does accucheck 2x/day at home    Diabetic neuropathy ([address] 8/10    DVT (deep venous thrombosis) ([facility] [address] 3/2004    Dyslipidemia 2009    Dyspareunia 2009    ETOH abuse 3/04/2007    Feet clawing     HTN (hypertension)     Hx of blood clots     Hyperlipidemia     MRSA (methicillin resistant staph aureus) culture positive 2017; 2017    foot; leg     Neuropathy 2009    polyneuropathy    Pain, back 2008    Pain, eye, right 04    Pancreatitis 04    Pseudocyst, pancreas 04    Scalp lesion 2007    Tinea pedis     Tobacco abuse 2008    Uses walker     Uses wheelchair     also uses walker    Vaginal bleeding, abnormal 2007    Wears dentures        Past Surgical History:          Procedure Laterality Date     SECTION  unknown    FOOT DEBRIDEMENT Right 2019    INCISION AND DRAINAGE WITH APPLICATION OF STRAVIX GRAFT RIGHT FOOT performed by Douglas León DPM at [address] [place] [address] Right 2019    RIGHT FOOT INCISION AND DRAINAGE WITH STAGING TRANSMETATARSAL AMPUTATION performed by Douglas León DPM at [address] [place] [address] Right 2019    RIGHT FOOT DEBRIDEMENT INCISION AND DRAINAGE, OPEN DIABETIC FOOT ULCER WITH GRAFT PLACEMENT performed by Douglas León DPM at [address] [place] [address] Left 10/23/2019    LEFT FOOT INCISION AND DRAINAGE , DEBRIDEMENT OF OPEN WOUND, APPLICATION OF STRAVIX GRAFT performed by Douglas León DPM at [address] [place] [address] Right 3/29/2021 INCISION AND DRAINAGE, DEBRIDEMENT OF DIABETIC WOUND WITH PLACEMENT OF STRAVIX GRAFT RIGHT FOOT [facility]     from falling off ladder -- has screws in place pt report    OTHER SURGICAL HISTORY Left 05/25/2016    I & D left foot    OTHER SURGICAL HISTORY Right 10/20/2017    RIGHT GASTROC LENGTHENING ENDOSCOPIC, INJECTION OF AMNI GRAFT    OTHER SURGICAL HISTORY Right 04/26/2018    Diabetic foot ulcer I&D w/ integra graft application    KS DEBRIDEMENT, SKIN, SUB-Q TISSUE,MUSCLE,BONE,=<20 SQ CM Right 8/17/2018    RIGHT FOOT DEBRIDEMENT INCISION AND DRAINAGE, PARTIAL 5TH RAY AMPUTATION performed by Dominique Gale DPM at [address] [facility] / [address]  7/7003    cryotherapy done on lesion    TOE AMPUTATION Left 02/24/2017    AMPUTATION LEFT GREAT TOE                 TONSILLECTOMY         Medications Prior to Admission:      Prior to Admission medications    Medication Sig Start Date End Date Taking? Authorizing Provider   amitriptyline (ELAVIL) 100 MG tablet TAKE 1 TABLET BY MOUTH EVERY NIGHT AT BEDTIME 6/15/21   Golden Looney MD   metoprolol succinate (TOPROL XL) 100 MG extended release tablet Take 1 tablet by mouth daily 6/2/21   Kenneth Katz MD   doxazosin (CARDURA) 4 MG tablet Take 1 tablet by mouth 2 times daily 5/24/21   Golden Looney MD   escitalopram (LEXAPRO) 10 MG tablet TAKE 1 TABLET BY MOUTH DAILY 5/20/21   Scottie Borrego MD   ELIQUIS 5 MG TABS tablet TAKE 1 TABLET BY MOUTH TWICE DAILY 5/17/21   Scottie Borrego MD   atorvastatin (LIPITOR) 20 MG tablet Take 1 tablet by mouth nightly 5/11/21   Luiza Francois MD   gabapentin (NEURONTIN) 400 MG capsule Take 1 capsule by mouth 2 times daily for 90 days. 4/19/21 7/18/21  Luiza Francois MD   furosemide (LASIX) 20 MG tablet Take 1 tablet by mouth daily 4/19/21   Luiza Francois MD   ammonium lactate (LAC-HYDRIN) 12 % lotion Apply topically daily.  3/24/21 Jorge Plunkett MD   omeprazole (PRILOSEC) 20 MG delayed release capsule Take 1 capsule by mouth every morning 1/27/21   Candelario Robles MD   insulin glargine (BASAGLAR KWIKPEN) 100 UNIT/ML injection pen Inject 50 Units into the skin daily 12/11/20   Celia Rollins MD   insulin aspart (NOVOLOG FLEXPEN) 100 UNIT/ML injection pen Inject 8 Units into the skin 3 times daily (before meals)  Patient taking differently: Inject 8 Units into the skin 3 times daily (before meals) Indications: states follows sliding scale  11/19/20   Kathy Bucio MD   nystatin (MYCOSTATIN) 421851 UNIT/GM powder Apply topically 2 times daily Apply to right breast and groin area BID 9/18/20   Donato Paige MD   aspirin EC 81 MG EC tablet Take 1 tablet by mouth daily 9/18/20   Donato Paige MD   Accu-Chek Softclix Lancets MISC 1 strip by Does not apply route 2 times daily Check before breakfast and before going to bed 9/18/20 4/12/21  Donato Paige MD   blood glucose test strips (TRUE METRIX BLOOD GLUCOSE TEST) strip 1 each by In Vitro route 2 times daily As needed.  9/18/20 4/12/21  Donato Paige MD   Insulin Pen Needle 31G X 5 MM MISC 1 each by Does not apply route daily 9/18/20   Donato Paige MD   Lancets MISC 1 each by Does not apply route 2 times daily PHARMACY MAY SUBSTITUTE TO TRUE METRIX LANCETS 9/18/20   Donato Paige MD   albuterol sulfate  (90 Base) MCG/ACT inhaler Inhale 2 puffs into the lungs every 6 hours as needed for Wheezing  Patient taking differently: Inhale 2 puffs into the lungs every 6 hours as needed for Wheezing  4/14/20   Luis Andrews MD   METHADONE HCL PO Take 140 mg by mouth Take whole bottle every AM  Comes from methadone clinic    Historical Provider, MD   Gauze Pads & Dressings MISC Please dispense 4x8 guaze, kerlix, and ace 2/23/18   Burton Beltran GAGE       Allergies:  Sulfa antibiotics    Social History:      The patient currently lives at home with daughter    TOBACCO:   reports that she quit smoking about 3 years ago. Her smoking use included cigarettes. She has a 30.00 pack-year smoking history. She has never used smokeless tobacco.  ETOH:  reports no history of alcohol use. Family History:     History reviewed. No pertinent family history. REVIEW OF SYSTEMS: Pertinent positives as noted in the HPI. All other systems reviewed and negative. ROS: Review of Systems   Constitutional: Negative. Negative for chills and fever. HENT: Negative. Eyes: Negative. Respiratory: Positive for shortness of breath. Cardiovascular: Negative. Negative for chest pain and palpitations. Gastrointestinal: Positive for abdominal pain. Negative for diarrhea, nausea and vomiting. Endocrine: Negative. Genitourinary: Negative. Negative for dysuria. Musculoskeletal: Negative. Skin: Positive for rash. Neurological: Negative. Psychiatric/Behavioral: Negative. PHYSICAL EXAM PERFORMED:    BP (!) 103/43   Pulse 50   Temp 98.2 °F (36.8 °C) (Oral)   Resp 15   Ht 5' 2\" (1.575 m)   Wt 203 lb (92.1 kg)   LMP 10/23/2015   SpO2 98%   BMI 37.13 kg/m²     General appearance:  No apparent distress, Obese, appears stated age and cooperative. HEENT:  Normal cephalic,atraumatic without obvious deformity. Pupils equal, round, and reactive to light. Extra ocular muscles intact. Conjunctivae/corneas clear. Neck: Supple, with full range of motion. No jugular venous distention. Trachea midline. Respiratory:  Normal respiratory effort. Clear to auscultation, bilaterally without Rales/Wheezes/Rhonchi. Cardiovascular:  Regular rate and rhythm with normal S1/S2 without murmurs, rubs or gallops. Abdomen: Soft, non-tender, non-distended with normal bowel sounds. Musculoskeletal:  No clubbing, cyanosis oredema bilaterally. Full range of motion without deformity.   Skin: Skin color, texture, turgor normal.  Erythema under bilateral breasts, under belly folds in the left lower quadrant tracking to the groin region. Foul-smelling. Neurologic:  Neurovascularly intact without any focal sensory/motor deficits. Cranialnerves: II-XII intact, grossly non-focal.  Psychiatric:  Alert and oriented, thought content appropriate,normal insight  Capillary Refill: Brisk,< 3 seconds   Peripheral Pulses: +2 palpable, equal bilaterally       Labs:     Recent Labs     06/17/21  1531   WBC 8.0   HGB 7.2*   HCT 23.4*        Recent Labs     06/17/21  1531      K 5.1      CO2 23   BUN 37*   CREATININE 2.3*   CALCIUM 8.2*     Recent Labs     06/17/21  1531   AST 16   ALT 11   BILITOT <0.2   ALKPHOS 149*     No results for input(s): INR in the last 72 hours. No results for input(s): Flakito Altes in the last 72 hours. Urinalysis:      Lab Results   Component Value Date    NITRU Negative 06/09/2019    WBCUA 10-20 06/09/2019    BACTERIA 1+ 06/09/2019    RBCUA 3-5 06/09/2019    BLOODU SMALL 06/09/2019    SPECGRAV 1.020 06/09/2019    GLUCOSEU Negative 06/09/2019       Radiology:     XR CHEST PORTABLE   Final Result      1. No acute disease. CT ABDOMEN PELVIS WO CONTRAST Additional Contrast? None   Final Result   1. Skin and subcutaneous thickening/infiltration most prominently noted of the anterior abdominal wall. Clinical correlation is recommended for cellulitis. No drainable or organized fluid collection. 2. Findings agree representing pancreatic pseudocysts or other benign entity. This is unchanged in size when compared with the prior study of 2016.   3. Lytic lesion of the L2 vertebral body which is new from the prior study. Metastasis or myeloma are favored entities, although this is indeterminant. MRI of the lumbar spine without and with contrast may be beneficial for further characterization. 4. Bilateral inguinal lymphadenopathy. This may be reactive. 5. Cholelithiasis.               ASSESSMENT & PLAN:  Umu Hoyt is a 60 y.o. female w/ PMHx of HTN, CKD 4, T2DM (s/p b/l toe amputations), DVT (on eliquis), who p/w pain in abdomen, under breasts and in groin. Cellulitis under breasts, left lower abdomen and groin  Has been going on for 1 week. Continually worsening. Patient is diabetic, obese. Recently finished 10-day course of Augmentin for bilateral leg cellulitis. 1.5 weeks ago. - Vancomycin and cefazolin  - clotrimazole cream BID  - Follow-up blood cultures, UA    Hypoglycemia  Took insulin today and didn't eat much  - Got an amp of D50 in the ED  - Started on D5 half-normal saline    Lytic lesion of the L2 vertebral body  New from prior study. R/o MM.  - MRI lumbar spine with and without contrast  -SPEP, UPEP, immunofixation    FAHEEM on CKD 3   Baseline cr 1.9-2.1. Cr 2.3 on admission. Seen by Dr. Rojas Vigil today in office  - Fluids  - RFP daily  - Consult to [place]    Chronic anemia  7.2 on admission. 9 in march. No history of bleeding.  - FOBT  - Holding PO iron in setting of infection  - F/u H&H at midnight, transfuse for <7    HTN  - Continue home doxazosin  - holding metop given borderline amrita HR. T2DM s/p b/l toe amputations  At home on NovoLog units 3 times daily before meals. 50 units of glargine daily. Currently hypoglycemic so we will give lower doses starting tomorrow.   - 20 units Lantus nightly  - LDSSI  - POCT, hypoglycemia protocol  -A1c    Peripheral neuropathy  - On gabapentin, holding given CrCl    Chronic back pain  - on methadone    Bilateral foot ulcerations  - Wound care nurse  - Elevate legs    B/l Leg swelling  - Lasix 20mg IV qd    History of DVT  -Continue home Eliquis 2.5 mg twice daily    GERD  - Protonix    Depression   - Cont Lexapro    DVT Prophylaxis: Eliquis  Diet: Carb controlled diet  Code Status: Full code  PT/OT Eval Status: Pending  Dispo -GMF    I will discuss the patient with the senior resident and MD Lalit Murray MD  Internal Medicine Resident, PGY-1

## 2021-06-17 NOTE — ED TRIAGE NOTES
Patient arrives c/o abdominal pain and swelling, as well as breast and groin swelling. Patient states that she fell onto her butt about 1 week ago and pt states that it \"shook up her insides\" and that is when the pain and swelling began. Patient falling asleep during triage but states that this is because she has not been sleeping since she found her brother  on memorial day weekend.

## 2021-06-18 NOTE — PROGRESS NOTES
4 Eyes Admission Assessment     I agree as the admission nurse that 2 RN's have performed a thorough Head to Toe Skin Assessment on the patient. ALL assessment sites listed below have been assessed on admission. Areas assessed by both nurses:   [x]   Head, Face, and Ears   [x]   Shoulders, Back, and Chest  [x]   Arms, Elbows, and Hands   [x]   Coccyx, Sacrum, and Ischium  [x]   Legs, Feet, and Heels        Does the Patient have Skin Breakdown? Cellulitis under abd pannus and under breasts. Diabetic leg ulcers-already wrapped from ER.        Ramirez Prevention initiated:  Yes   Wound Care Orders initiated:  No      C nurse consulted for Pressure Injury (Stage 3,4, Unstageable, DTI, NWPT, and Complex wounds) or Ramirez score 18 or lower:  No      Nurse 1 eSignature: Electronically signed by Percival Primrose, RN on 6/17/21 at 10:40 PM EDT    **SHARE this note so that the co-signing nurse is able to place an eSignature**    Nurse 2 eSignature: Zoial Hernández RN

## 2021-06-18 NOTE — CARE COORDINATION
Case Management Assessment           Initial Evaluation                Date / Time of Evaluation: 6/18/2021 2:50 PM                 Assessment Completed by: Marian Calle RN    Patient Name: Angie Hurt     YOB: 1965  Diagnosis: Cellulitis [L03.90]     Date / Time: 6/17/2021  3:36 PM    Patient Admission Status: Inpatient     Met with pt at the bedside to complete initial assessment. Her father was present and involved in the conversation. She is alert and oriented x 4. Pleasant and easy to engage in conversation. Pt stated she lives at home with her 2 daughters in a multi-level house. She stays on the first floor. She can ambulate independently with a cane or walker. She needs some assistance with bathing and dressing. Continent. Family is supportive. She is not active with any services in the community. She is to have an MRI tomorrow morning and may discharge home. She is interested in a walker, if possible. Father, Rupinder Marion, will transport. If patient is discharged prior to next notation, then this note serves as note for discharge by case management.      Current PCP: Jon Marie MD  Clinic Patient: No    Chart Reviewed: Yes  Patient/ Family Interviewed: Yes    Initial assessment completed at bedside with: Met with Lory Morales and her father at the bedside    Hospitalization in the last 30 days: No    Emergency Contacts:  Extended Emergency Contact Information  Primary Emergency Contact: DANAE White [address] Phone: [phone]  Relation: Parent  Secondary Emergency Contact: [address] Phone: [phone]  Relation: Child    Advance Directives:   Code Status: Full [address]: No    Financial  Payor: [address] / Plan: Anurag Guzman / Product Type: *No Product type* /     Pre-cert required for SNF: Yes    Pharmacy    [id number] - [facility]  [address]  [address]  Phone: [phone] Fax: [phone]      Potential assistance Purchasing Medications:    Does Patient want to participate in local refill/ meds to beds program?:      Meds To Beds General Rules:  1. Can ONLY be done Monday- Friday between 8:30am-5pm  2. Prescription(s) must be in pharmacy by 3pm to be filled same day  3. Copy of patient's insurance/ prescription drug card and patient face sheet must be sent along with the prescription(s)  4. Cost of Rx cannot be added to hospital bill. If financial assistance is needed, please contact unit  or ;  or  CANNOT provide pharmacy voucher for patients co-pays  5.  Patients can then  the prescription on their way out of the hospital at discharge, or pharmacy can deliver to the bedside if staff is available. (payment due at time of pick-up or delivery - cash, check, or card accepted)     Able to afford home medications/ co-pay costs: Yes    ADLS  Support Systems:      PT AM-PAC: 22 /24  OT AM-PAC: 20 /24    [place]: Multi-level house but pt stays on the 1st floor  Steps: n/a    Plans to RETURN to current housing: Yes  Barriers to RETURNING to current housing: Pt is not medically ready    DISCHARGE PLAN:  Disposition: Home- No Services Needed    Transportation PLAN for discharge: family     Factors facilitating achievement of predicted outcomes: Family support, Motivated, Cooperative, Pleasant, Good insight into deficits and Has needed Durable Medical Equipment at home    Barriers to discharge: Pain    The Plan for Transition of Care is related to the following treatment goals of Cellulitis [L03.90]    The Patient and/or patient representative Boubaacr Mckeon and her family were provided with a choice of provider and agrees with the discharge plan Yes    Freedom of choice list was provided with basic dialogue that

## 2021-06-18 NOTE — CONSULTS
Clinical Pharmacy Progress Note    Admit date: 6/17/2021     Subjective/Objective:  Pt is a 62yof with PMHx that includes DM, asthma, pancreatitis with pseudocyst, DVT, carpal tunnel, chronic back pain, neuropathy, HTN, HLD, and CKD 3 (baseline SCr 1.9-2.1) who is admitted with cellulitis under breasts, left lower abdomen, and groin. Of note, patient did just finish a course of Augmentin about 1.5 weeks ago for LE cellulitis. Pt also found to have FAHEEM on CKD, hypoglycemia, anemia, and L2 vertebral body lytic lesion. Pharmacy is consulted to dose Vancomycin per Dr. Blas Crawford    Current antibiotics:  Cefazolin 1000mg IV q12h (6/17-current) - day #2  Vancomycin - Pharmacy to dose - day #2   Intermittent doses (6/17 - current)    Date Vancomycin level Vancomycin dose   6/17  1500mg   6/18 25.8 mcg/mL --              Recent Labs     06/17/21  1531 06/18/21  0541    141   K 5.1 4.4    108   CO2 23 24   BUN 37* 35*   CREATININE 2.3* 2.3*   GLUCOSE 46* 65*       Estimated Creatinine Clearance: 30 mL/min (A) (based on SCr of 2.3 mg/dL (H)). Lab Results   Component Value Date    WBC 7.7 06/18/2021    HGB 6.8 (LL) 06/18/2021    HCT 21.6 (L) 06/18/2021    MCV 78.9 (L) 06/18/2021     06/18/2021       Lab Results   Component Value Date    PROTIME 11.2 06/04/2019    INR 0.98 06/04/2019       Height:  5' 2\" (157.5 cm)  Weight:  218 lb 0.6 oz (98.9 kg)    Culture results:  Blood (6/17) = in process    Prophylaxis:  VTE:  Apixaban  GI:  Pantoprazole      Assessment/Plan:  1)  Cellulitis under breasts, groin, and left lower abdomen:  Cefazolin + Vancomycin - day #2  · Vancomycin - Pharmacy to dose  · Will dose intermittently given slight FAHEEM on CKD 3.  · Received 1500mg IV x1 last evening. Random level this AM = 25.8mcg/mL. Will not give any Vancomycin today. Will check random level in AM tomorrow. · Clinical condition will be monitored closely, and levels / doses will be ordered as appropriate.     Please call with questions--  Thanks--  Herson Lyons, PharmD, BCPS, [facility]  [id number] ([facility])   6/18/2021 10:43 AM

## 2021-06-18 NOTE — PROGRESS NOTES
hgb improved from 6.8 to 8.3 after 1 U blood    MRI form faxed. MRI wasn't able to get to pt today dt case load (transport arrived, pt need antianxiety med dt claustrophobia, ran out of time- will be done tomorrow). A+Ox4, VSS. Elevated BP-Metoprolol reordered for the evening. Remains on 2L for intermittent naps throughout the day (desats to 80s while sleeping)    Redness remains in groins, under breasts and under abdomen (malodorous). Miconazole powder and Interdry applied    SBA w/ cane. Tolerates well, calls appropriately. Pitting edema in RUE (+1). BLE ACE wrapped (CDI). Crust noted in eye lids (cleaned). Pt states her eyes don't hurt. Will cont to monitor.  Call light in reach-uses appropriately

## 2021-06-18 NOTE — PROGRESS NOTES
Pt refused MRI. States she was expecting it tomorrow and didn't have time to mentally prepare herself for it. Also has more questions about it for the doctor.  Resident notified and will come by shortly to speak with patient

## 2021-06-18 NOTE — DISCHARGE SUMMARY
INTERNAL MEDICINE    RESIDENT DISCHARGE SUMMARY   Discharge Summaries      Patient ID: Amina Serra   Gender: female      :  1965  AGE: 60 y.o. MRN:  [id number]  Code Status: Full Code   PCP: Sis Vincent MD    Admit date:  2021      Discharge date:  2021    Admitting Physician:  Janice Ribeiro MD    Discharge Physician: Vielka Mejia DO     Admission Condition:  fair  Discharged Condition:  good Stable    Discharge Diagnoses:  Cellulitis  FAHEEM on CKD 3     Active Hospital Problems    Diagnosis Date Noted    Cellulitis [L03.90] 2018       The patient was seen and examined on day of discharge and this discharge summary is in conjunction with any daily progress note from day of discharge. Hospital Course:   Ms. Franny Sanford is a 57-year-old female with past medical history of chronic kidney disease 3, type 2 diabetes, uncontrolled, chronic normocytic anemia, narcotic dependent chronic pain, morbid obesity, DVT on Eliquis who presented to the [facility] ED on  with complaints of pain in her abdomen, under breasts, and groin. Patient recently completed a 10-day course of Augmentin for lower extremity cellulitis. Patient stated that the redness in all 3 areas began about 1 week ago. She presented to the ED following her nephrology appointment. Nephrologist sent her to the ED for possible cellulitis. In the ED patient was afebrile, labs were significant for a creatinine of 2.3, CT abdomen pelvis revealed skin and subcutaneous thickening/infiltration of the anterior abdominal wall, no drainable organized fluid collection. Lytic lesion of the L2 vertebral body new from prior. Bilateral inguinal lymphadenopathy. Patient was admitted for treatment of cellulitis as well as kidney injury.     The following were addressed during patient's hospitalization    Inflammatory erythematous skin lesion, cellulitis  Patient stated that the's rash had been present No drainable or organized fluid collection. 2. Findings agree representing pancreatic pseudocysts or other benign entity. This is unchanged in size when compared with the prior study of 2016.   3. Lytic lesion of the L2 vertebral body which is new from the prior study. Metastasis or myeloma are favored entities, although this is indeterminant. MRI of the lumbar spine without and with contrast may be beneficial for further characterization. 4. Bilateral inguinal lymphadenopathy. This may be reactive. 5. Cholelithiasis. Consults:     PHARMACY TO DOSE VANCOMYCIN  IP CONSULT TO HOSPITALIST  IP CONSULT TO PHARMACY  IP CONSULT TO NEPHROLOGY  PHARMACY TO DOSE VANCOMYCIN      Discharge Instructions/Follow-up:  pcp within 1 week. Activity: activity as tolerated    Diet: diabetic diet    Discharge Medications:     Current Discharge Medication List           Details   doxycycline hyclate (VIBRA-TABS) 100 MG tablet Take 1 tablet by mouth 2 times daily for 5 days  Qty: 10 tablet, Refills: 0              Details   methadone (DOLOPHINE) 10 MG/ML solution Take 140 mg by mouth daily. Verified dose with [facility] ([phone]) 6/18/21      amitriptyline (ELAVIL) 100 MG tablet TAKE 1 TABLET BY MOUTH EVERY NIGHT AT BEDTIME  Qty: 30 tablet, Refills: 2    Associated Diagnoses: Type 2 diabetes mellitus with diabetic polyneuropathy, with long-term current use of insulin ([address]  Diabetic polyneuropathy associated with type 2 diabetes mellitus (HCC)      metoprolol succinate (TOPROL XL) 100 MG extended release tablet Take 1 tablet by mouth daily  Qty: 90 tablet, Refills: 1      doxazosin (CARDURA) 4 MG tablet Take 1 tablet by mouth 2 times daily  Qty: 30 tablet, Refills: 1    Associated Diagnoses: Essential hypertension      escitalopram (LEXAPRO) 10 MG tablet TAKE 1 TABLET BY MOUTH DAILY  Qty: 30 tablet, Refills: 1    Associated Diagnoses: Depression, unspecified depression type      ELIQUIS 5 MG TABS tablet TAKE 1 TABLET BY MOUTH TWICE DAILY  Qty: 60 tablet, Refills: 1    Associated Diagnoses: Deep vein thrombosis (DVT) of proximal lower extremity, unspecified chronicity, unspecified laterality (HCC)      atorvastatin (LIPITOR) 20 MG tablet Take 1 tablet by mouth nightly  Qty: 90 tablet, Refills: 1    Associated Diagnoses: Dyslipidemia      gabapentin (NEURONTIN) 400 MG capsule Take 1 capsule by mouth 2 times daily for 90 days. Qty: 180 capsule, Refills: 0    Associated Diagnoses: Diabetic polyneuropathy associated with type 2 diabetes mellitus ([facility] DM type 2, uncontrolled, with lower extremity ulcer ([facility]      furosemide (LASIX) 20 MG tablet Take 1 tablet by mouth daily  Qty: 90 tablet, Refills: 1    Associated Diagnoses: Chronic heart failure with preserved ejection fraction ([facility] Chronic systolic heart failure ([facility])      ammonium lactate (LAC-HYDRIN) 12 % lotion Apply topically daily.   Qty: 1 Bottle, Refills: 0    Associated Diagnoses: Diabetic polyneuropathy associated with type 2 diabetes mellitus (HCC)      omeprazole (PRILOSEC) 20 MG delayed release capsule Take 1 capsule by mouth every morning  Qty: 30 capsule, Refills: 5    Associated Diagnoses: Gastroesophageal reflux disease      insulin glargine (BASAGLAR KWIKPEN) 100 UNIT/ML injection pen Inject 50 Units into the skin daily  Qty: 5 pen, Refills: 5    Associated Diagnoses: DM type 2, uncontrolled, with lower extremity ulcer (HCC)      insulin aspart (NOVOLOG FLEXPEN) 100 UNIT/ML injection pen Inject 8 Units into the skin 3 times daily (before meals)  Qty: 5 pen, Refills: 3      nystatin (MYCOSTATIN) 966080 UNIT/GM powder Apply topically 2 times daily Apply to right breast and groin area BID  Qty: 30 g, Refills: 3    Associated Diagnoses: Diabetic foot infection (HCC)      aspirin EC 81 MG EC tablet Take 1 tablet by mouth daily  Qty: 60 tablet, Refills: 3    Associated Diagnoses: DM type 2, uncontrolled, with lower extremity ulcer (HCC) albuterol sulfate  (90 Base) MCG/ACT inhaler Inhale 2 puffs into the lungs every 6 hours as needed for Wheezing  Qty: 1 Inhaler, Refills: 5    Comments: Ok to substitute for alternative if not covered by insurance. blood glucose test strips (TRUE METRIX BLOOD GLUCOSE TEST) strip 1 each by In Vitro route 2 times daily As needed. Qty: 200 each, Refills: 5    Associated Diagnoses: DM type 2, uncontrolled, with lower extremity ulcer (HCC)      Insulin Pen Needle 31G X 5 MM MISC 1 each by Does not apply route daily  Qty: 100 each, Refills: 5    Associated Diagnoses: DM type 2, uncontrolled, with lower extremity ulcer ([address]      Lancets MISC 1 each by Does not apply route 2 times daily PHARMACY MAY SUBSTITUTE TO TRUE METRIX LANCETS  Qty: 100 each, Refills: 5    Associated Diagnoses: DM type 2, uncontrolled, with lower extremity ulcer (HCC)      Gauze Pads & Dressings MISC Please dispense 4x8 guaze, kerlix, and ace  Qty: 1 each, Refills: 2             Time Spent on discharge is more than 45 minutes in the examination, evaluation, counseling and review of medications and discharge plan. Signed:    Fernandez Newsome DO   Internal Medicine Resident, PGY-1  6/21/2021      Thank you Dora Benitez MD for the opportunity to be involved in this patient's care. If you have any questions or concerns please feel free to contact me.

## 2021-06-18 NOTE — PROGRESS NOTES
Physical Therapy    Facility/Department: [facility] [address]  Initial Assessment and DISCHARGE    NAME: Trinidad Jiménez  : 1965  MRN: [id number]    Date of Service: 2021    Discharge Recommendations:  Trinidad Jiménez scored a 22/24 on the AM-PAC short mobility form. At this time, no further PT is recommended upon discharge. Recommend patient returns to prior setting with prior services. PT Equipment Recommendations  Equipment Needed: No    Assessment   Assessment: Pt from home with cellulitis. Pt demonstrates transfers and gait close to functional baseline and pt in agreement. Pt reaches out for balance with cane, but declined walker. \"I always do this. \"  No further PT needs identified. Recommend continued activity/ambulation with RN staff. Will sign off. Decision Making: Medium Complexity  Patient Education: Role of PT. Pt verbalized understanding. REQUIRES PT FOLLOW UP: No         Restrictions  Up with assist     Vision/Hearing  Vision: Within Functional Limits  Hearing: Within functional limits       Subjective  Chart Reviewed: Yes  Additional Pertinent Hx: Pt to ED  with abdominal pain and possible cellulitis. CXR (-). ABd CT: anterior wall cellulitis. PMH:  asthma, COPD, HTN, DM, DVT, ETOH abuse, neuropathy, back pain  Diagnosis: Cellulitis    Subjective  Subjective: Pt found supine in bed, sleeping. Increased time to awaken. \"I have to go to the bathroom. \"  Pt reports her mobility is close to baseline.   Pain Screening  Patient Currently in Pain: Denies    Orientation  Within Functional Limits    Social/Functional History  Lives With: Daughter  Type of Home: House  Home Layout: Able to Live on Main level with bedroom/bathroom  Home Access: Stairs to enter with rails (5)  Bathroom Shower/Tub:  (sponge bathes)  Bathroom Toilet: Handicap height  Bathroom Equipment:  (None)  Home Equipment: Rolling walker, 4 wheeled walker, Wheelchair-manual, Cane  ADL Assistance: Needs assistance  Homemaking Assistance: Needs assistance  Homemaking Responsibilities: No  Ambulation Assistance: Independent (no AD in house, uses walker vs w/c in community?)  Transfer Assistance: Independent  Active : No  Occupation: On disability  Additional Comments: Pt reports recent falls off couch. Pt occasionally sleeps on couch. Objective  Strength  Strength RLE: WFL  Strength LLE: WFL    Bed mobility  Supine to Sit: Modified independent  Sit to Supine: Modified independent     Transfers  Sit to Stand: Supervision  Stand to sit: Supervision     Ambulation  Device: Single point cane  Assistance: Stand by assistance  Quality of Gait: Flexed posture, reaching out for balance  Gait Deviations: Decreased step length; Increased REINALDO; Slow Monisha  Distance: 20ft + 10ft  Comments: Pt reports \"I always reach out like this. \"    Balance  Sitting - Static: Good  Sitting - Dynamic: Good  Standing - Static: Fair  Standing - Dynamic: Fair    Treatment included gait and transfer training with patient education     Plan: Discharge acute PT      Safety Devices  Type of devices: Left in bed, Bed alarm in place, Call light within reach, Nurse notified      AM-PAC Score  AM-PAC Inpatient Mobility Raw Score : 22 (06/18/21 0934)  AM-PAC Inpatient T-Scale Score : 53.28 (06/18/21 0934)  Mobility Inpatient CMS 0-100% Score: 20.91 (06/18/21 0934)  Mobility Inpatient CMS G-Code Modifier : CJ (06/18/21 0934)      Therapy Time   Individual Concurrent Group Co-treatment   Time In 0842         Time Out 0920         Minutes 38         Timed Code Treatment Minutes:  25  Total Treatment Minutes:  [address], PT

## 2021-06-18 NOTE — PROGRESS NOTES
Occupational Therapy   Occupational Therapy Initial Assessment  Date: 2021   Patient Name: Bonnie Garay  MRN: [id number]     : 1965    Date of Service: 2021    Discharge Recommendations:Tameka Alcala Every scored a 20/24 on the [facility] ADL Inpatient form. At this time, no further OT is recommended upon discharge due to pt is at/near baseline as self-reported. Recommend patient returns to prior setting with prior services. OT Equipment Recommendations  Equipment Needed: No    Assessment   Assessment: Pt is 55y F admitted from home w/ cellulitis. Pt demonstrating ADL and fxl mobliyt at/near baseline, and verbalizing as such. Pt w/ no OT needs identified. Recommend d/c to home w/ prior assist/supports from family. Recommend out of bed actitivity w/ nursing - OT sign off. Prognosis: Good  OT Education: OT Role;Plan of Care  Patient Education: pt verblized agreement/understanding  No Skilled OT: Safe to return home; At baseline function  REQUIRES OT FOLLOW UP: No  Activity Tolerance  Activity Tolerance: Patient Tolerated treatment well  Safety Devices  Safety Devices in place:  (transport at room to take pt to MRI, and RN in room w/ pt on OT/PT exit)           Patient Diagnosis(es): The encounter diagnosis was Cellulitis of abdominal wall.     has a past medical history of Amenorrhea, Asthma, Bacterial vaginosis, Carpal tunnel syndrome, COPD (chronic obstructive pulmonary disease) ([facility] Diabetes mellitus type II, Diabetic neuropathy ([facility] DVT (deep venous thrombosis) ([facility] Dyslipidemia, Dyspareunia, ETOH abuse, Feet clawing, HTN (hypertension), Hx of blood clots, Hyperlipidemia, MRSA (methicillin resistant staph aureus) culture positive, Neuropathy, Pain, back, Pancreatitis, Pseudocyst, pancreas, Scalp lesion, Tinea pedis, Tobacco abuse, Uses walker, Uses wheelchair, and Wears dentures.    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 Restrictions  Position Activity Restriction  Other position/activity restrictions: Up with assist    Subjective   General  Additional Pertinent Hx: Hussain Cornejo is a 60 y.o. female who presents that presents to the above chief complaint. Was seen by nephrology today and physician concern for abdominal wall edema with possible cellulitis. She was told to come to the emergency department. Referring Practitioner: Malou Ham MD  Diagnosis: cellulitis  Subjective  Subjective: Pt semi-supine on OT/PT approach, difficult to rouse - once awake participated in eval.  General Comment  Comments: Pt reporting feeling to be at/near baseline.   Patient Currently in Pain: Denies  Pain Assessment  Pain Assessment: 0-10  Pain Level: 0  Vital Signs  Temp: 97.7 °F (36.5 °C)  Temp Source: Axillary  Pulse: 62  Heart Rate Source: Monitor  Resp: 15  BP: 123/60  BP Location: Left upper arm  Patient Position: High fowlers  Level of Consciousness: Alert (0)  MEWS Score: 1  Patient Currently in Pain: Denies  Oxygen Therapy  SpO2: 94 %  O2 Device: Nasal cannula  O2 Flow Rate (L/min): 2 L/min  Social/Functional History  Social/Functional History  Lives With: Daughter  Type of Home: House  Home Layout: Able to Live on Main level with bedroom/bathroom  Home Access: Stairs to enter with rails (5)  Bathroom Shower/Tub:  (sponge bathes)  Bathroom Toilet: Handicap height  Bathroom Equipment:  (None)  Home Equipment: Rolling walker, 4 wheeled walker,

## 2021-06-18 NOTE — CONSULTS
Addendum 6/18 08:30:  [facility] ([phone]) this morning to verify Methadone dose.       Current dose is Methadone (10mg/mL concentrated oral solution) 140mg po daily. Pt last seen in clinic 6/15, and was given 2 week supply for take home dosing.     Will let IM team know this information, and home med updated.        Please call with questions--  Thanks--  Jessica Cevallos, PharmD, [address], [address] (hospital [facility] [place])                            Pharmacy Consult Note  - Admission Medication Reconciliation      Pharmacy consulted for reconciliation of [id number] medications. I reviewed Rx fill history via \"Complete Dispense Report\" in Epic, and spoke to patient on phone. The following changes made to njusb-ax-knahhbyat medication list:    ADDED:  1. none    Dose or Frequency CHANGE:  1. Furosemide from 20mg daily to BID  2. Novolog from 8 units daily to SSI  3. Ammonium lactate from daily to as needed    REMOVED:  1. none    NOTES:  Unable to reach methadone clinic [facility] [phone]) as they are closed so I could not confirm patients home methadone dose. Will follow up in the AM    [facility] [address] 1285 LIST  No current facility-administered medications on file prior to encounter. Medication Sig    amitriptyline (ELAVIL) 100 MG tablet TAKE 1 TABLET BY MOUTH EVERY NIGHT AT BEDTIME    metoprolol succinate (TOPROL XL) 100 MG extended release tablet Take 1 tablet by mouth daily    doxazosin (CARDURA) 4 MG tablet Take 1 tablet by mouth 2 times daily    escitalopram (LEXAPRO) 10 MG tablet TAKE 1 TABLET BY MOUTH DAILY    ELIQUIS 5 MG TABS tablet TAKE 1 TABLET BY MOUTH TWICE DAILY    atorvastatin (LIPITOR) 20 MG tablet Take 1 tablet by mouth nightly    gabapentin (NEURONTIN) 400 MG capsule Take 1 capsule by mouth 2 times daily for 90 days.     furosemide (LASIX) 20 MG tablet Take 1 tablet by mouth daily (Patient taking differently: Take 20 mg by mouth 2 times daily )    ammonium lactate (LAC-HYDRIN) 12 % lotion Apply topically daily. (Patient taking differently: Apply topically as needed Apply topically daily.)    omeprazole (PRILOSEC) 20 MG delayed release capsule Take 1 capsule by mouth every morning    insulin glargine (BASAGLAR KWIKPEN) 100 UNIT/ML injection pen Inject 50 Units into the skin daily    insulin aspart (NOVOLOG FLEXPEN) 100 UNIT/ML injection pen Inject 8 Units into the skin 3 times daily (before meals) (Patient taking differently: Inject 8 Units into the skin 3 times daily (before meals) Indications: states follows sliding scale )    nystatin (MYCOSTATIN) 987711 UNIT/GM powder Apply topically 2 times daily Apply to right breast and groin area BID    aspirin EC 81 MG EC tablet Take 1 tablet by mouth daily    albuterol sulfate  (90 Base) MCG/ACT inhaler Inhale 2 puffs into the lungs every 6 hours as needed for Wheezing (Patient taking differently: Inhale 2 puffs into the lungs every 6 hours as needed for Wheezing )    METHADONE HCL PO Take 100 mg by mouth Comes from methadone clinic ([facility])          Thank you for the consult  Please call with questions:    Fredy BAEZ   6/17/2021 9:52 PM  [phone] ([address] [place] [address])

## 2021-06-18 NOTE — PROGRESS NOTES
Pt was drowsy/deep sleep overnight until this morning. Pt is now alert and oriented, wide awake. Denies pain, n/v. IVF and abx infusing, BLE wrapped. Call light and bedside table within reach. Fall precaution in place. Bed alarm on, bed at lowest position. Pt tolerating diet.

## 2021-06-18 NOTE — PLAN OF CARE
Problem: Falls - Risk of:  Goal: Will remain free from falls  Description: Will remain free from falls  Outcome: Ongoing  Note: Sba w/ cane. Tolerates well. Non skid socks on. Appropriate safety awareness. Call light in reach-uses appropriately.  Bed locked in lowest position

## 2021-06-18 NOTE — PROGRESS NOTES
Progress Note    Admit Date: 6/17/2021  Day: 1  Diet: ADULT DIET; Regular; 4 carb choices (60 gm/meal)    CC: Cellulitis    Interval history:   No acute events overnight. Patient seen and examined at bedside this morning. She states that her rash in her groin is painful to touch. States that she has noticed a malodorous smell coming from the site. Patient denies any bleeding. Denies fever chills shortness of breath chest pain.       Medications:     Scheduled Meds:   miconazole   Topical BID    LORazepam  0.5 mg Oral Once    aspirin EC  81 mg Oral Daily    atorvastatin  20 mg Oral Nightly    doxazosin  4 mg Oral BID    apixaban  5 mg Oral BID    pantoprazole  40 mg Oral QAM AC    sodium chloride flush  5-40 mL Intravenous 2 times per day    ceFAZolin  1,000 mg Intravenous Q12H    furosemide  20 mg Intravenous Daily    escitalopram  10 mg Oral Daily    vancomycin (VANCOCIN) intermittent dosing (placeholder)   Other See Admin Instructions    insulin lispro  0-6 Units Subcutaneous TID WC    insulin lispro  0-3 Units Subcutaneous Nightly     Continuous Infusions:   sodium chloride      sodium chloride      dextrose      dextrose 5 % and 0.45 % NaCl 100 mL/hr at 06/17/21 2316     PRN Meds:sodium chloride, sodium chloride flush, sodium chloride, ondansetron **OR** ondansetron, polyethylene glycol, acetaminophen **OR** acetaminophen, glucose, dextrose, glucagon (rDNA), dextrose    Objective:   Vitals:   T-max:  Patient Vitals for the past 8 hrs:   BP Temp Temp src Pulse Resp SpO2 Weight   06/18/21 0818 123/60 97.7 °F (36.5 °C) Axillary 62 15 94 % --   06/18/21 0553 -- -- -- -- -- -- 218 lb 0.6 oz (98.9 kg)   06/18/21 0242 134/61 97.7 °F (36.5 °C) Oral 59 12 94 % --       Intake/Output Summary (Last 24 hours) at 6/18/2021 1001  Last data filed at 6/18/2021 0818  Gross per 24 hour   Intake 10 ml   Output --   Net 10 ml       Review of Systems noted in HPI    Physical Exam  Constitutional: Appearance: She is obese. HENT:      Mouth/Throat:      Mouth: Mucous membranes are moist.   Eyes:      Extraocular Movements: Extraocular movements intact. Cardiovascular:      Rate and Rhythm: Normal rate and regular rhythm. Pulses: Normal pulses. Heart sounds: No murmur heard. No gallop. Pulmonary:      Effort: Pulmonary effort is normal. No respiratory distress. Abdominal:      General: Bowel sounds are normal.      Tenderness: There is no abdominal tenderness. There is no guarding. Skin:     General: Skin is warm. Findings: Lesion present. Comments: Erythema under bilateral breasts, under belly folds in the left lower quadrant tracking to the groin region. Tan exudate noted from groin region  Foul-smelling. Neurological:      Mental Status: She is alert. LABS:    CBC:   Recent Labs     06/17/21  1531 06/17/21  2337 06/18/21  0541   WBC 8.0  --  7.7   HGB 7.2* 7.0* 6.8*   HCT 23.4* 21.8* 21.6*     --  289   MCV 80.5  --  78.9*     Renal:    Recent Labs     06/17/21  1531 06/18/21  0541    141   K 5.1 4.4    108   CO2 23 24   BUN 37* 35*   CREATININE 2.3* 2.3*   GLUCOSE 46* 65*   CALCIUM 8.2* 8.2*   MG  --  2.20   PHOS  --  3.7   ANIONGAP 8 9     Hepatic:   Recent Labs     06/17/21  1531 06/18/21  0541   AST 16  --    ALT 11  --    BILITOT <0.2  --    PROT 6.1*  6.5  --    LABALBU 2.9* 2.5*   ALKPHOS 149*  --      Troponin: No results for input(s): TROPONINI in the last 72 hours. BNP: No results for input(s): BNP in the last 72 hours. Lipids: No results for input(s): CHOL, HDL in the last 72 hours. Invalid input(s): LDLCALCU, TRIGLYCERIDE  ABGs:  No results for input(s): PHART, WJC2JLL, PO2ART, ZOG8VYJ, BEART, THGBART, T7ZIAKIZ, VUC7GNV in the last 72 hours. INR: No results for input(s): INR in the last 72 hours.   Lactate: No results for input(s): LACTATE in the last 72 hours.  Cultures:  -----------------------------------------------------------------  RAD:   XR CHEST PORTABLE   Final Result      1. No acute disease. CT ABDOMEN PELVIS WO CONTRAST Additional Contrast? None   Final Result   1. Skin and subcutaneous thickening/infiltration most prominently noted of the anterior abdominal wall. Clinical correlation is recommended for cellulitis. No drainable or organized fluid collection. 2. Findings agree representing pancreatic pseudocysts or other benign entity. This is unchanged in size when compared with the prior study of 2016.   3. Lytic lesion of the L2 vertebral body which is new from the prior study. Metastasis or myeloma are favored entities, although this is indeterminant. MRI of the lumbar spine without and with contrast may be beneficial for further characterization. 4. Bilateral inguinal lymphadenopathy. This may be reactive. 5. Cholelithiasis. MRI LUMBAR SPINE W WO CONTRAST    (Results Pending)       Assessment/Plan:     Cellulitis under breasts, left lower abdomen and groin  Has been going on for 1 week. Continually worsening. Patient is diabetic, obese. Recently finished 10-day course of Augmentin for bilateral leg cellulitis. 1.5 weeks ago. -Continue Vancomycin cefazolin (6/17-)  -Miconazole powder  -Blood cultures pending  -ESR, CRP elevated     Lytic lesion of the L2 vertebral body  New from prior study. R/o MM.  -MRI lumbar spine with and without contrast pending  -SPEP 6.1, UPEP, immunofixation     FAHEEM on CKD 3   Baseline cr 1.9-2.1. Cr 2.3 on admission.  -Creatinine 2.3 this morning  -Nephrology consulted  -IV fluids fluids     Chronic anemia likely secondary to kidney disease, malnutrition  7.2 on admission. 9 in march. No history of bleeding.   No history of colonoscopy  -FOBT pending  -Hemoglobin 6.8 this morning, transfuse 1 unit follow-up posttransfusion H&H  -Iron studies pending, ferritin 17.9  -Will need GI colonoscopy outpatient  -po iron every other day     HTN- Continue home doxazosin, continue metop (Hold for HR <60).      T2DM s/p b/l toe amputations, uncontrolled  Home regimen-Lantus 50, NovoLog 8 3 times daily  - Hold 20 units Lantus nightly given hypoglycemia glucose 65 this morning  - LDSSI   - POCT, hypoglycemia protocol  - A1c 6.8, falsely decreased in setting of anemia     Hypoglycemia  -sp D50 in the ED  -Hold D5    Bilateral foot ulcerations  - Wound care nurse  - Elevate legs     B/l Leg swelling  - Lasix 20mg IV qd    Peripheral neuropathy- On gabapentin, holding given CrCl  Chronic back pain-resume methadone  History of DVT-Continue home Eliquis bid  GERD- Protonix  Depression - Cont Lexapro    Code Status: Full code  FEN: Carb controlled  PPX: Eliquis  PT: 22, OT: 20  DISPO:     Cynthia Nunez DO, PGY-1  06/18/21  10:01 AM    This patient has been staffed and discussed with Eric Joy MD.

## 2021-06-18 NOTE — PROGRESS NOTES
Clinical Pharmacy Progress Note    [facility] ([phone]) this morning to verify Methadone dose. Current dose is Methadone (10mg/mL concentrated oral solution) 140mg po daily. Pt last seen in clinic 6/15, and was given 2 week supply for take home dosing. Will let IM team know this information, and home med updated.       Please call with questions--  Thanks--  Jessica Cevallos, PharmD, [address]  [phone] ([facility] [place])   6/18/2021 8:14 AM

## 2021-06-18 NOTE — CONSULTS
Nephrology Consult Note                                                                                                                                                                                                                                                                                                                                                               Office : [phone]     Fax :[phone]              Patient's Name: Ervin Hernandez  6:03 PM  6/18/2021    Reason for Consult: FAHEEM  Requesting Physician:  Scottie Borrego MD      Chief Complaint:  Abd pain and swelling      History of Present Ilness:    Ervin Hernandez is a 60 y.o. female with a PMHx of HTN, CKD 4, T2DM (s/p b/l toe amputations), DVT (on eliquis) who p/w pain in abdomen, under breasts and in groin. Patient is very sleepy. I had to sternal rub her to finally get her to wake up but when she woke up she was totally alert and oriented x4. She states she is a really deep sleeper. Patient states that she recently found out that her brother passed away on Memorial Day weekend and therefore has not been sleeping well recently. Patient finished a 10-day course of Augmentin 1.5 weeks ago for lower extremity cellulitis. She states that the redness in all 3 areas began around 1 week ago and does have a foul smell. She did endorse some shortness of breath yesterday that improved with her inhalers. Patient states that she took her insulin this morning however did not eat anything afterwards.   She denied any trauma to any of the areas, fevers/chills, no nausea or vomiting, hematuria, hematochezia, melena, hemoptysis, chest pain, dysuria, diarrhea or constipation    Past Medical History:   Diagnosis Date    Amenorrhea     Asthma 05/14/2004    Bacterial vaginosis 04/2008    Carpal tunnel syndrome 05/2007    [facility]     Diabetes mellitus type II 08/2007    10/1/20 pt states does accucheck 2x/day at home    Diabetic neuropathy ([facility] 2010    [facility] 2004    Dyslipidemia 2009    Dyspareunia 2009    ETOH abuse 2007    Feet clawing     HTN (hypertension)     Hx of blood clots     Hyperlipidemia     MRSA (methicillin resistant staph aureus) culture positive 2017; 2017    foot; leg     Neuropathy 2009    polyneuropathy    Pain, back 2008    Pancreatitis 2004    Pseudocyst, pancreas 2004    Scalp lesion 2007    Tinea pedis     Tobacco abuse 2008    Uses walker     Uses wheelchair     also uses walker    Wears dentures        Past Surgical History:   Procedure Laterality Date     SECTION  unknown    FOOT DEBRIDEMENT Right 2019    INCISION AND DRAINAGE WITH APPLICATION OF STRAVIX GRAFT RIGHT FOOT performed by Michael Monte DPM at [address] Right 2019    RIGHT FOOT INCISION AND DRAINAGE WITH STAGING TRANSMETATARSAL AMPUTATION performed by Michael Monte DPM at [address] Right 2019    RIGHT FOOT DEBRIDEMENT INCISION AND DRAINAGE, OPEN DIABETIC FOOT ULCER WITH GRAFT PLACEMENT performed by Michael Monte DPM at [address] Left 10/23/2019    LEFT FOOT INCISION AND DRAINAGE , DEBRIDEMENT OF OPEN WOUND, APPLICATION OF STRAVIX GRAFT performed by Michael Monte DPM at [address] Right 3/29/2021    INCISION AND DRAINAGE, DEBRIDEMENT OF DIABETIC WOUND WITH PLACEMENT OF STRAVIX GRAFT RIGHT FOOT performed by Michael Monte DPM at [facility] Left     from falling off ladder -- has screws in place pt report    OTHER SURGICAL HISTORY Left 2016    I & D left foot    OTHER SURGICAL HISTORY Right 10/20/2017    RIGHT GASTROC LENGTHENING ENDOSCOPIC, INJECTION OF AMNI GRAFT    OTHER SURGICAL HISTORY Right 2018    Diabetic foot ulcer I&D w/ integra graft application    GA DEBRIDEMENT, SKIN, SUB-Q TISSUE,MUSCLE,BONE,=<20 SQ CM Right 8/17/2018    RIGHT FOOT DEBRIDEMENT INCISION AND DRAINAGE, PARTIAL 5TH RAY AMPUTATION performed by Michael Monte DPM at [address] [place] / [address]  7/7003    cryotherapy done on lesion    TOE AMPUTATION Left 02/24/2017    AMPUTATION LEFT GREAT TOE                 TONSILLECTOMY         History reviewed. No pertinent family history. reports that she quit smoking about 3 years ago. Her smoking use included cigarettes. She has a 30.00 pack-year smoking history. She has never used smokeless tobacco. She reports that she does not drink alcohol and does not use drugs.     Allergies:  Sulfa antibiotics    Current Medications:    0.9 % sodium chloride infusion, PRN  miconazole (MICOTIN) 2 % powder, BID  LORazepam (ATIVAN) tablet 0.5 mg, Once  ferrous sulfate (IRON 325) tablet 325 mg, Every Other Day  metoprolol succinate (TOPROL XL) extended release tablet 100 mg, Nightly  methadone (DOLOPHINE) 10 MG/ML solution 140 mg, Daily  aspirin EC tablet 81 mg, Daily  atorvastatin (LIPITOR) tablet 20 mg, Nightly  doxazosin (CARDURA) tablet 4 mg, BID  apixaban (ELIQUIS) tablet 5 mg, BID  pantoprazole (PROTONIX) tablet 40 mg, QAM AC  sodium chloride flush 0.9 % injection 5-40 mL, 2 times per day  sodium chloride flush 0.9 % injection 5-40 mL, PRN  0.9 % sodium chloride infusion, PRN  ondansetron (ZOFRAN-ODT) disintegrating tablet 4 mg, Q8H PRN   Or  ondansetron (ZOFRAN) injection 4 mg, Q6H PRN  polyethylene glycol (GLYCOLAX) packet 17 g, Daily PRN  acetaminophen (TYLENOL) tablet 650 mg, Q6H PRN   Or  acetaminophen (TYLENOL) suppository 650 mg, Q6H PRN  ceFAZolin (ANCEF) 1,000 mg in dextrose 5 % 50 mL IVPB (mini-bag), Q12H  glucose (GLUTOSE) 40 % oral gel 15 g, PRN  dextrose 50 % IV solution, PRN  glucagon (rDNA) injection 1 mg, PRN  dextrose 5 % solution, PRN  furosemide (LASIX) injection 20 mg, Daily  escitalopram (LEXAPRO) tablet 10 mg, Daily  vancomycin (VANCOCIN) intermittent dosing (placeholder), See Admin Instructions  insulin lispro (1 Unit Dial) 0-6 Units, TID WC  insulin lispro (1 Unit Dial) 0-3 Units, Nightly        Review of Systems:   14 point ROS obtained but were negative except mentioned in HPI      Physical exam:     Vitals:  BP (!) 156/69   Pulse 69   Temp 98.4 °F (36.9 °C) (Oral)   Resp 15   Ht 5' 2\" (1.575 m)   Wt 218 lb 0.6 oz (98.9 kg)   LMP 10/23/2015   SpO2 96%   BMI 39.88 kg/m²   Constitutional:  OAA X3 NAD  Skin: no rash, turgor wnl  Heent:  eomi, mmm  Neck: no bruits or jvd noted  Cardiovascular:  S1, S2 without m/r/g  Respiratory: CTA B without w/r/r  Abdomen:  +bs, soft, nt, nd  Ext: + lower extremity edema  Psychiatric: mood and affect appropriate  Musculoskeletal:  Rom, muscular strength intact    Data:   Labs:  CBC:   Recent Labs     06/17/21  1531 06/17/21  2337 06/18/21  0541 06/18/21  1438   WBC 8.0  --  7.7  --    HGB 7.2* 7.0* 6.8* 8.3*     --  289  --      BMP:    Recent Labs     06/17/21  1531 06/18/21  0541    141   K 5.1 4.4    108   CO2 23 24   BUN 37* 35*   CREATININE 2.3* 2.3*   GLUCOSE 46* 65*     Ca/Mg/Phos:   Recent Labs     06/17/21  1531 06/18/21  0541   CALCIUM 8.2* 8.2*   MG  --  2.20   PHOS  --  3.7     Hepatic:   Recent Labs     06/17/21  1531   AST 16   ALT 11   BILITOT <0.2   ALKPHOS 149*     Troponin: No results for input(s): TROPONINI in the last 72 hours. BNP: No results for input(s): BNP in the last 72 hours. Lipids: No results for input(s): CHOL, TRIG, HDL, LDLCALC, LABVLDL in the last 72 hours. ABGs: No results for input(s): PHART, PO2ART, HAV7FGP in the last 72 hours. INR: No results for input(s): INR in the last 72 hours. UA:No results for input(s): Jie Enriqueta, GLUCOSEU, BILIRUBINUR, George Lemming, BLOODU, PHUR, PROTEINU, UROBILINOGEN, NITRU, LEUKOCYTESUR, LABMICR, URINETYPE in the last 72 hours.    Urine Microscopic: No results for input(s): LABCAST, BACTERIA, COMU, HYALCAST, WBCUA, RBCUA, EPIU in the last 72 hours. Urine Culture: No results for input(s): LABURIN in the last 72 hours. Urine Chemistry:   Recent Labs     06/18/21  1000   PROTEINUR 177.00*  0.177             IMAGING:  XR CHEST PORTABLE   Final Result      1. No acute disease. CT ABDOMEN PELVIS WO CONTRAST Additional Contrast? None   Final Result   1. Skin and subcutaneous thickening/infiltration most prominently noted of the anterior abdominal wall. Clinical correlation is recommended for cellulitis. No drainable or organized fluid collection. 2. Findings agree representing pancreatic pseudocysts or other benign entity. This is unchanged in size when compared with the prior study of 2016.   3. Lytic lesion of the L2 vertebral body which is new from the prior study. Metastasis or myeloma are favored entities, although this is indeterminant. MRI of the lumbar spine without and with contrast may be beneficial for further characterization. 4. Bilateral inguinal lymphadenopathy. This may be reactive. 5. Cholelithiasis. MRI LUMBAR SPINE W WO CONTRAST    (Results Pending)       Assessment/Plan   1. FAHEEM on CKD 4 - baseline Cr 1.9     2. HTN    3. Anemia    4. Acid- base/ Electrolyte imbalance     5. Cellulitis     6.  Anasarca     7 Hx of DVT on eliquis     Plan   - IV abx   - ur studies   - lasix x 1 dose today   - No IVF   - BNP in am   - dose diuretics per UO and renal function   - labs in am     Recommend to dose adjust all medications  based on renal functions  Maintain SBP> 90 mmHg   Daily weights   AVOID NSAIDs  Avoid Nephrotoxins  Monitor Intake/Output  Call if significant decrease in urine output                 Thank you for allowing us to participate in care of Yovana Amaral MD  Feel free to contact me   [facility] [address]  Office : [phone]  Fax :[phone]

## 2021-06-19 NOTE — PROGRESS NOTES
Nephrology Note                                                                                                                                                                                                                                                                                                                                                               Office : [phone]     Fax :[phone]              Patient's Name: Owen Aguilar  10:27 AM  2021    Reason for Consult: FAHEEM  Requesting Physician:  Isaiah Pabon MD      Chief Complaint:  Abd pain and swelling      History of Present Ilness:    Owen Aguilar is a 60 y.o. female with a PMHx of HTN, CKD 4, T2DM (s/p b/l toe amputations), DVT (on eliquis) who p/w pain in abdomen, under breasts and in groin. Patient is very sleepy. I had to sternal rub her to finally get her to wake up but when she woke up she was totally alert and oriented x4. She states she is a really deep sleeper. Patient states that she recently found out that her brother passed away on Memorial Day weekend and therefore has not been sleeping well recently. Patient finished a 10-day course of Augmentin 1.5 weeks ago for lower extremity cellulitis. She states that the redness in all 3 areas began around 1 week ago and does have a foul smell. She did endorse some shortness of breath yesterday that improved with her inhalers. Patient states that she took her insulin this morning however did not eat anything afterwards.   She denied any trauma to any of the areas, fevers/chills, no nausea or vomiting, hematuria, hematochezia, melena, hemoptysis, chest pain, dysuria, diarrhea or constipation    Feels fair  Good UO 2 L  No shortness of breath     Past Surgical History:   Procedure Laterality Date     SECTION  unknown    FOOT DEBRIDEMENT Right 2019    INCISION AND DRAINAGE WITH APPLICATION OF STRAVIX GRAFT RIGHT FOOT performed by Asya Schmidt DPM at [address]  FOOT DEBRIDEMENT Right 6/6/2019    RIGHT FOOT INCISION AND DRAINAGE WITH STAGING TRANSMETATARSAL AMPUTATION performed by Milton Li DPM at [address] Right 7/5/2019    RIGHT FOOT DEBRIDEMENT INCISION AND DRAINAGE, OPEN DIABETIC FOOT ULCER WITH GRAFT PLACEMENT performed by Milton Li DPM at [address] Left 10/23/2019    LEFT FOOT INCISION AND DRAINAGE , DEBRIDEMENT OF OPEN WOUND, APPLICATION OF STRAVIX GRAFT performed by Milton Li DPM at [address] Right 3/29/2021    INCISION AND DRAINAGE, DEBRIDEMENT OF DIABETIC WOUND WITH PLACEMENT OF STRAVIX GRAFT RIGHT FO[facility]     from falling off ladder -- has screws in place pt report    OTHER SURGICAL HISTORY Left 05/25/2016    I & D left foot    OTHER SURGICAL HISTORY Right 10/20/2017    RIGHT GASTROC LENGTHENING ENDOSCOPIC, INJECTION OF AMNI GRAFT    OTHER SURGICAL HISTORY Right 04/26/2018    Diabetic foot ulcer I&D w/ integra graft application    ND DEBRIDEMENT, SKIN, SUB-Q TISSUE,MUSCLE,BONE,=<20 SQ CM Right 8/17/2018    RIGHT FOOT DEBRIDEMENT INCISION AND DRAINAGE, PARTIAL 5TH RAY AMPUTATION performed by Milton Li DPM at [address] [facility] / [address]  8/6759    cryotherapy done on lesion    TOE AMPUTATION Left 02/24/2017    AMPUTATION LEFT GREAT TOE                 TONSILLECTOMY         History reviewed. No pertinent family history. reports that she quit smoking about 3 years ago. Her smoking use included cigarettes. She has a 30.00 pack-year smoking history. She has never used smokeless tobacco. She reports that she does not drink alcohol and does not use drugs.     Allergies:  Sulfa antibiotics    Current Medications:    insulin glargine (LANTUS;BASAGLAR) injection pen 10 Units, Nightly  vancomycin (VANCOCIN) 1,000 mg in dextrose 5 % 250 mL IVPB, Once  0.9 % sodium chloride infusion, PRN  miconazole (MICOTIN) 2 % powder, BID  ferrous sulfate (IRON 325) tablet 325 mg, Every Other Day  metoprolol succinate (TOPROL XL) extended release tablet 100 mg, Nightly  methadone (DOLOPHINE) 10 MG/ML solution 140 mg, Daily  LORazepam (ATIVAN) injection 0.25 mg, Once  aspirin EC tablet 81 mg, Daily  atorvastatin (LIPITOR) tablet 20 mg, Nightly  doxazosin (CARDURA) tablet 4 mg, BID  apixaban (ELIQUIS) tablet 5 mg, BID  pantoprazole (PROTONIX) tablet 40 mg, QAM AC  sodium chloride flush 0.9 % injection 5-40 mL, 2 times per day  sodium chloride flush 0.9 % injection 5-40 mL, PRN  0.9 % sodium chloride infusion, PRN  ondansetron (ZOFRAN-ODT) disintegrating tablet 4 mg, Q8H PRN   Or  ondansetron (ZOFRAN) injection 4 mg, Q6H PRN  polyethylene glycol (GLYCOLAX) packet 17 g, Daily PRN  acetaminophen (TYLENOL) tablet 650 mg, Q6H PRN   Or  acetaminophen (TYLENOL) suppository 650 mg, Q6H PRN  ceFAZolin (ANCEF) 1,000 mg in dextrose 5 % 50 mL IVPB (mini-bag), Q12H  glucose (GLUTOSE) 40 % oral gel 15 g, PRN  dextrose 50 % IV solution, PRN  glucagon (rDNA) injection 1 mg, PRN  dextrose 5 % solution, PRN  escitalopram (LEXAPRO) tablet 10 mg, Daily  vancomycin (VANCOCIN) intermittent dosing (placeholder), See Admin Instructions  insulin lispro (1 Unit Dial) 0-6 Units, TID WC  insulin lispro (1 Unit Dial) 0-3 Units, Nightly          Physical exam:     Vitals:  BP (!) 167/69   Pulse 62   Temp 98.3 °F (36.8 °C) (Oral)   Resp 18   Ht 5' 2\" (1.575 m)   Wt 218 lb 0.6 oz (98.9 kg)   LMP 10/23/2015   SpO2 93%   BMI 39.88 kg/m²   Constitutional:  OAA X3 NAD  Skin: no rash, turgor wnl  Heent:  eomi, mmm  Neck: no bruits or jvd noted  Cardiovascular:  S1, S2 without m/r/g  Respiratory: CTA B without w/r/r  Abdomen:  +bs, soft, nt, nd  Ext: + lower extremity edema      Data:   Labs:  CBC:   Recent Labs     06/17/21  1531 06/18/21  0541 06/18/21  1438 06/18/21  2127 06/19/21  0538   WBC 8.0 7.7  --   --  8.1   HGB 7.2* 6.8* 8.3* 8.2* 8.2*    289  --   --  280     BMP:    Recent Labs     06/17/21  1531 06/18/21  0541 06/19/21  0538    141 135*   K 5.1 4.4 4.6    108 104   CO2 23 24 23   BUN 37* 35* 31*   CREATININE 2.3* 2.3* 2.0*   GLUCOSE 46* 65* 190*     Ca/Mg/Phos:   Recent Labs     06/17/21  1531 06/18/21  0541 06/19/21  0538   CALCIUM 8.2* 8.2* 8.2*   MG  --  2.20 2.10   PHOS  --  3.7 3.2     Hepatic:   Recent Labs     06/17/21  1531   AST 16   ALT 11   BILITOT <0.2   ALKPHOS 149*     Troponin: No results for input(s): TROPONINI in the last 72 hours. BNP: No results for input(s): BNP in the last 72 hours. Lipids: No results for input(s): CHOL, TRIG, HDL, LDLCALC, LABVLDL in the last 72 hours. ABGs: No results for input(s): PHART, PO2ART, FKE3BBG in the last 72 hours. INR: No results for input(s): INR in the last 72 hours. UA:No results for input(s): [place], GLUCOSEU, BILIRUBINUR, Jaz Carlota, BLOODU, PHUR, PROTEINU, UROBILINOGEN, NITRU, LEUKOCYTESUR, LABMICR, URINETYPE in the last 72 hours. Urine Microscopic: No results for input(s): LABCAST, BACTERIA, COMU, HYALCAST, WBCUA, RBCUA, EPIU in the last 72 hours. Urine Culture: No results for input(s): LABURIN in the last 72 hours. Urine Chemistry:   Recent Labs     06/18/21  1000   PROTEINUR 177.00*  0.177             IMAGING:  XR CHEST PORTABLE   Final Result      1. No acute disease. CT ABDOMEN PELVIS WO CONTRAST Additional Contrast? None   Final Result   1. Skin and subcutaneous thickening/infiltration most prominently noted of the anterior abdominal wall. Clinical correlation is recommended for cellulitis. No drainable or organized fluid collection. 2. Findings agree representing pancreatic pseudocysts or other benign entity. This is unchanged in size when compared with the prior study of 2016.   3. Lytic lesion of the L2 vertebral body which is new from the prior study.  Metastasis or myeloma are favored entities, although this is indeterminant. MRI of the lumbar spine without and with contrast may be beneficial for further characterization. 4. Bilateral inguinal lymphadenopathy. This may be reactive. 5. Cholelithiasis. MRI LUMBAR SPINE W WO CONTRAST    (Results Pending)       Assessment/Plan   1. FAHEEM on CKD 4 - baseline Cr 1.9   2.3->2  No UA    2. HTN    3. Anemia    4. Acid- base/ Electrolyte imbalance     5. Cellulitis     6.  Anasarca     7 Hx of DVT on eliquis     Plan   - IV abx   - ur studies   - lasix   - No IVF     - dose diuretics per UO and renal function   - labs in am     Recommend to dose adjust all medications  based on renal functions  Maintain SBP> 90 mmHg   Daily weights   AVOID NSAIDs  Avoid Nephrotoxins  Monitor Intake/Output  Call if significant decrease in urine output                 Thank you for allowing us to participate in care of Carola Nicholas MD  Feel free to contact me   [facility] [address]  Office : [phone]  Fax :[phone]

## 2021-06-19 NOTE — PROGRESS NOTES
Patient only wants 100mg of her 140mg dose of methadone. Unused 50mg syringe walked to pharmacy and handed to pharmacist on duty.     Electronically signed by Rosemary Thomas RN on 6/19/2021 at 12:10 AM

## 2021-06-19 NOTE — PLAN OF CARE
Pt free from falls this shift. Fall precautions in place at all times. Call light always within reach. Pt able and agreeable to contact for safety appropriately. Pain/discomfort being managed with PRN analgesics per MD orders. Pt able to express presence and absence of pain and rate pain appropriately using numerical scale. Patient currently being treated for skin infection at this time.

## 2021-06-19 NOTE — PROGRESS NOTES
Following secure message sent to resident:    Patient admitted for cellulitis of BLE. Patient BP running high usually 410-244'O systolic. Patient on Toprol and Cardura scheduled. Toprol given. BP tonight of 206/93 and 196/73 tonight. Recheck after scheduled Toprol is 170/64. Patient is concerned about her BP being elevated. Do we want any further interventions tonight?        Electronically signed by Nahun Griffin RN on 6/18/2021 at 10:58 PM

## 2021-06-19 NOTE — PROGRESS NOTES
Patient is A&O x3.  O2 2L, sat 96%. No complaints of SOB. Medicated for c/o abdominal skin pain as needed. Being treated for cellulitis. Respirations appear to be easy and unlabored. Lungs clear. Respirations easy with no complaints of cough. Cardiac monitor in place, current rhythm NSR. Right wrist PIV intact and flushed. No complaints of nausea/vomiting/diarrhea. Up with assist/cane to the bathroom/BSC as needed. Abdominal pannus, abraham area, and under breasts red. Ace wraps to BLE intact. Patient removed upper ace wrap to left upper thight due to it being wrapped too tight. Indention noted in left upper thigh. Tolerating diabetic diet. Plan of care and safety measures reviewed with the patient. Call light in reach and bed alarm in place. Will continue to monitor.   Electronically signed by Bri Proctor RN on 6/19/2021 at 12:10 AM

## 2021-06-19 NOTE — PLAN OF CARE
Problem: Falls - Risk of:  Goal: Will remain free from falls  Description: Will remain free from falls  6/19/2021 0742 by Salo Mitchell RN  Outcome: Ongoing  Note: Patient alert and oriented X3, non-skid socks on, bed in lowest position and locked, side rails up X2, call light and belongings within reach, bed alarm on for safety, and fall sign posted. Will continue to monitor.

## 2021-06-19 NOTE — PROGRESS NOTES
Pt back from MRI. Pt is stating she is having pain to her back and was given PRN Tylenol. Pt is on 2 L of O2 while sleeping. Urine samples were sent down to lab. Pt is up X1 with cane. Pt denies any numbness or tingling to BLE or BUE. Pt back in bed, with side rails up and bed alarm on. Call light within reach, VSS.

## 2021-06-20 NOTE — PLAN OF CARE
Problem: Falls - Risk of:  Goal: Will remain free from falls  Description: Will remain free from falls  Outcome: Ongoing  Note: Pt is a High fall risk. See Gerold Angelucci Fall Score and ABCDS Injury Risk assessments. + Screening for Orthostasis and/or + High Fall Risk per VALENTINE/ABCDS: Explained fall risk precautions to pt and family and rationale behind their use to keep the patient safe. Pt bed is in low position, side rails up, call light and belongings are in reach. Fall wristband applied and present on pts wrist.  Bed alarm on. Pt encouraged to call for assistance. Will continue with hourly rounds for PO intake, pain needs, toileting and repositioning as needed. Problem: Pain:  Goal: Pain level will decrease  Description: Pain level will decrease  Outcome: Ongoing  Note: Patient able to rate pain appropriately utilizing numerical pain scale. Patient denies any pain or discomfort at this time. Instructed patient to notify RN of any increased or uncontrolled pain needs. Continue to monitor.

## 2021-06-20 NOTE — PROGRESS NOTES
Progress Note    Admit Date: 6/17/2021  Day: 2  Diet: ADULT DIET; Regular; 4 carb choices (60 gm/meal); Low Potassium (Less than 3000 mg/day)    CC: Cellulitis    Interval history:   Pt was seen and examined at bedside. She had no acute events overnight. This AM she reports she feels well and denies F/C, CP, SOB, abd pain, N/V. She reports improvement in her rash. She states its dry and less erythematous. It remains tender to touch. Pt sating 98% on 2L so NC was d/kayce.       Medications:     Scheduled Meds:   insulin glargine  25 Units Subcutaneous Nightly    sodium zirconium cyclosilicate  5 g Oral Once    vancomycin  750 mg Intravenous Once    furosemide  40 mg Oral Daily    miconazole   Topical BID    ferrous sulfate  325 mg Oral Every Other Day    metoprolol succinate  100 mg Oral Nightly    methadone  140 mg Oral Daily    aspirin EC  81 mg Oral Daily    atorvastatin  20 mg Oral Nightly    doxazosin  4 mg Oral BID    apixaban  5 mg Oral BID    pantoprazole  40 mg Oral QAM AC    sodium chloride flush  5-40 mL Intravenous 2 times per day    ceFAZolin  1,000 mg Intravenous Q12H    escitalopram  10 mg Oral Daily    vancomycin (VANCOCIN) intermittent dosing (placeholder)   Other See Admin Instructions    insulin lispro  0-6 Units Subcutaneous TID WC    insulin lispro  0-3 Units Subcutaneous Nightly     Continuous Infusions:   sodium chloride      sodium chloride      dextrose       PRN Meds:sodium chloride, sodium chloride flush, sodium chloride, ondansetron **OR** ondansetron, polyethylene glycol, acetaminophen **OR** acetaminophen, glucose, dextrose, glucagon (rDNA), dextrose    Objective:   Vitals:   T-max:  Patient Vitals for the past 8 hrs:   BP Temp Temp src Pulse Resp SpO2   06/20/21 0800 (!) 162/79 98.4 °F (36.9 °C) Oral 57 16 98 %       Intake/Output Summary (Last 24 hours) at 6/20/2021 1103  Last data filed at 6/20/2021 0259  Gross per 24 hour   Intake 60 ml   Output 950 ml   Net -890 ml       Review of Systems noted in HPI    Physical Exam  Constitutional:       General: She is not in acute distress. Appearance: Normal appearance. She is obese. She is not ill-appearing. HENT:      Head: Normocephalic and atraumatic. Mouth/Throat:      Mouth: Mucous membranes are moist.   Eyes:      General: Vision grossly intact. Extraocular Movements: Extraocular movements intact. Conjunctiva/sclera: Conjunctivae normal.   Cardiovascular:      Rate and Rhythm: Normal rate and regular rhythm. Pulses: Normal pulses. Heart sounds: Normal heart sounds, S1 normal and S2 normal. No murmur heard. No friction rub. No gallop. Pulmonary:      Effort: Pulmonary effort is normal. No respiratory distress. Breath sounds: Normal breath sounds and air entry. No wheezing or rales. Abdominal:      General: Bowel sounds are normal. There is no distension. Palpations: Abdomen is soft. Tenderness: There is no abdominal tenderness. There is no guarding. Musculoskeletal:         General: Normal range of motion. Cervical back: Full passive range of motion without pain, normal range of motion and neck supple. Right lower leg: No edema. Left lower leg: No edema. Skin:     General: Skin is warm. Capillary Refill: Capillary refill takes less than 2 seconds. Coloration: Skin is not pale. Findings: Lesion present. Comments: Erythema under bilateral breasts, under belly folds in the left lower quadrant tracking to the groin region. No exudate noted from groin region. Not Foul smelling anymore. Neurological:      Mental Status: She is alert and oriented to person, place, and time. Mental status is at baseline.    Psychiatric:         Mood and Affect: Mood normal.         Speech: Speech normal.         Judgment: Judgment normal.         LABS:    CBC:   Recent Labs     06/18/21  0541 06/18/21  2127 06/19/21  0538 06/20/21  0711   WBC 7.7  --  8.1 7.2 HGB 6.8* 8.2* 8.2* 8.6*   HCT 21.6* 23.4* 23.6* 26.0*     --  280 279   MCV 78.9*  --  81.8 82.0     Renal:    Recent Labs     06/18/21  0541 06/19/21  0538 06/20/21  0711    135* 137   K 4.4 4.6 5.2*    104 106   CO2 24 23 24   BUN 35* 31* 29*   CREATININE 2.3* 2.0* 2.0*   GLUCOSE 65* 190* 263*   CALCIUM 8.2* 8.2* 8.4   MG 2.20 2.10 2.20   PHOS 3.7 3.2 3.1   ANIONGAP 9 8 7     Hepatic:   Recent Labs     06/17/21  1531 06/18/21  0541 06/19/21  0538 06/20/21  0711   AST 16  --   --   --    ALT 11  --   --   --    BILITOT <0.2  --   --   --    PROT 6.1*  6.5  --   --   --    LABALBU 2.6*  2.9* 2.5* 2.6* 2.7*   ALKPHOS 149*  --   --   --      Troponin: No results for input(s): TROPONINI in the last 72 hours. BNP: No results for input(s): BNP in the last 72 hours. Lipids: No results for input(s): CHOL, HDL in the last 72 hours. Invalid input(s): LDLCALCU, TRIGLYCERIDE  ABGs:  No results for input(s): PHART, ASS1KZI, PO2ART, RRL0PJU, BEART, THGBART, X3QAZAPR, OAF5KMW in the last 72 hours. INR: No results for input(s): INR in the last 72 hours. Lactate: No results for input(s): LACTATE in the last 72 hours. Cultures:  -----------------------------------------------------------------  RAD:   MRI LUMBAR SPINE W WO CONTRAST   Final Result      1. No suspicious osseous lesion. 2. Osseous hemangioma L2 vertebral body. 3. Multilevel degenerative disc disease and superimposed epidural lipomatosis causing spinal canal compromise. 4. Severe thecal sac effacement at L5-S1. Moderate thecal sac effacement at L3-4 and L4-5.   5. No significant interval change since 2015. XR CHEST PORTABLE   Final Result      1. No acute disease. CT ABDOMEN PELVIS WO CONTRAST Additional Contrast? None   Final Result   1. Skin and subcutaneous thickening/infiltration most prominently noted of the anterior abdominal wall. Clinical correlation is recommended for cellulitis.  No drainable or organized fluid collection. 2. Findings agree representing pancreatic pseudocysts or other benign entity. This is unchanged in size when compared with the prior study of 2016.   3. Lytic lesion of the L2 vertebral body which is new from the prior study. Metastasis or myeloma are favored entities, although this is indeterminant. MRI of the lumbar spine without and with contrast may be beneficial for further characterization. 4. Bilateral inguinal lymphadenopathy. This may be reactive. 5. Cholelithiasis. Assessment/Plan:     Cellulitis under breasts, left lower abdomen and groin  Has been going on for 1 week. Continually worsening. Patient is diabetic, obese. Recently finished 10-day course of Augmentin for bilateral leg cellulitis. 1.5 weeks ago. -Continue Vancomycin cefazolin (6/17-)  -Miconazole powder  -Blood cultures NGTD  -ESR, CRP elevated  -Wound culture pending. Gram stain: 1+ Epithelial Cells, 1+ Gram negative rods, 1+ Gram positive cocci      Lytic lesion of the L2 vertebral body  Seen on CT abd pelvis. New from prior study. R/o for myeloma. -MRI lumbar spine with and without contrast shows No suspicious osseous lesion, Osseous hemangioma L2 vertebral body   -SPEP 6.1, UPEP, immunofixation. Urine protein 177  -Light chain kappa lambda ordered     FAHEEM on CKD 3, prerenal in the setting of dehydration, improving   Baseline cr 1.9-2.1. Cr 2.3 on admission.  -Creatinine 2.0 this morning  -Nephrology following   -No IV fluids   -Dose diuretics per urine output and renal function  -1.1 L urine out in the past 24 hours    Chronic anemia likely secondary to kidney disease, malnutrition  7.2 on admission. 9 in march. No history of bleeding. No history of colonoscopy. FOBT negative.  Iron studies: Iron 17, iron saturation 8, TIBC 224, ferritin 17.9  - sp 1 unit pRBCs  - Hgb stable  - Will need GI colonoscopy outpatient  - po iron every other day     HTN- Continue home doxazosin, continue metoprolol (Hold for HR <60).    T2DM s/p b/l toe amputations, uncontrolled  Home regimen-Lantus 50, NovoLog 8 3 times daily  - Increase Lantus to 25U nightly  - LDSSI   - POCT, hypoglycemia protocol  - A1c 6.8, falsely decreased in setting of anemia     Hypoglycemia, resolved  -sp D50 and D5 gtt    Bilateral foot ulcerations  - Wound care counsulted  - Elevate legs     B/l Leg swelling  -Lasix 40mg daily    Peripheral neuropathy- On gabapentin, holding given CrCl  Chronic back pain-continue methadone  History of DVT-Continue home Eliquis bid  GERD- Protonix  Depression - Cont Lexapro    Code Status: Full code  FEN: Carb controlled  PPX: Eliquis  PT: 22, OT: 20  DISPO:     William Lerner MD, PGY-2  06/20/21  11:03 AM    This patient has been staffed and discussed with Alice Dejesus MD.       Patient seen and examined, plan of care discussed with residents. Agree with their assessment and plan with following addendum:    Patient reports improvement in her skin tenderness. We are awaiting results of wound culture. Continue with Ancef and vancomycin for now and topical antifungal.  Creatinine seems to be holding close to baseline. Will assess for discharge tomorrow.      Alice Dejesus MD

## 2021-06-20 NOTE — PROGRESS NOTES
Nephrology Note                                                                                                                                                                                                                                                                                                                                                               Office : [phone]     Fax :[phone]              Patient's Name: Ike Perez  8:24 AM  2021    Reason for Consult: FAHEEM  Requesting Physician:  Jordis Crigler, MD      Chief Complaint:  Abd pain and swelling      History of Present Ilness:    Ike Perez is a 60 y.o. female with a PMHx of HTN, CKD 4, T2DM (s/p b/l toe amputations), DVT (on eliquis) who p/w pain in abdomen, under breasts and in groin. Patient is very sleepy. I had to sternal rub her to finally get her to wake up but when she woke up she was totally alert and oriented x4. She states she is a really deep sleeper. Patient states that she recently found out that her brother passed away on Memorial Day weekend and therefore has not been sleeping well recently. Patient finished a 10-day course of Augmentin 1.5 weeks ago for lower extremity cellulitis. She states that the redness in all 3 areas began around 1 week ago and does have a foul smell. She did endorse some shortness of breath yesterday that improved with her inhalers. Patient states that she took her insulin this morning however did not eat anything afterwards.   She denied any trauma to any of the areas, fevers/chills, no nausea or vomiting, hematuria, hematochezia, melena, hemoptysis, chest pain, dysuria, diarrhea or constipation    Feels fair  UO dec  No shortness of breath     Past Surgical History:   Procedure Laterality Date     SECTION  unknown    FOOT DEBRIDEMENT Right 2019    INCISION AND DRAINAGE WITH APPLICATION OF STRAVIX GRAFT RIGHT FOOT performed by Babs Brenner DPM at [address] FOOT DEBRIDEMENT Right 6/6/2019    RIGHT FOOT INCISION AND DRAINAGE WITH STAGING TRANSMETATARSAL AMPUTATION performed by Anjana Juárez DPM at [address] [place] [address] Right 7/5/2019    RIGHT FOOT DEBRIDEMENT INCISION AND DRAINAGE, OPEN DIABETIC FOOT ULCER WITH GRAFT PLACEMENT performed by Anjana Juárez DPM at [address] [place] [address] Left 10/23/2019    LEFT FOOT INCISION AND DRAINAGE , DEBRIDEMENT OF OPEN WOUND, APPLICATION OF STRAVIX GRAFT performed by Anjana Juárez DPM at [address] [place] [address] Right 3/29/2021    INCISION AND DRAINAGE, DEBRIDEMENT OF DIABETIC WOUND WITH PLACEMENT OF STRAVIX GRAFT RIGHT FOOT performed by Anjana Juárez DPM at [facility] Left     from falling off ladder -- has screws in place pt report    OTHER SURGICAL HISTORY Left 05/25/2016    I & D left foot    OTHER SURGICAL HISTORY Right 10/20/2017    RIGHT GASTROC LENGTHENING ENDOSCOPIC, INJECTION OF AMNI GRAFT    OTHER SURGICAL HISTORY Right 04/26/2018    Diabetic foot ulcer I&D w/ integra graft application    NE DEBRIDEMENT, SKIN, SUB-Q TISSUE,MUSCLE,BONE,=<20 SQ CM Right 8/17/2018    RIGHT FOOT DEBRIDEMENT INCISION AND DRAINAGE, PARTIAL 5TH RAY AMPUTATION performed by Anjana Juárez DPM at [facility] / [address]  7/7003    cryotherapy done on lesion    TOE AMPUTATION Left 02/24/2017    AMPUTATION LEFT GREAT TOE                 TONSILLECTOMY         History reviewed. No pertinent family history. reports that she quit smoking about 3 years ago. Her smoking use included cigarettes. She has a 30.00 pack-year smoking history. She has never used smokeless tobacco. She reports that she does not drink alcohol and does not use drugs.     Allergies:  Sulfa antibiotics    Current Medications:    insulin glargine (LANTUS;BASAGLAR) injection pen 25 Units, Nightly  furosemide (LASIX) tablet 40 mg, Daily  0.9 % sodium chloride infusion, PRN  miconazole (MICOTIN) 2 % powder, BID  ferrous sulfate (IRON 325) tablet 325 mg, Every Other Day  metoprolol succinate (TOPROL XL) extended release tablet 100 mg, Nightly  methadone (DOLOPHINE) 10 MG/ML solution 140 mg, Daily  aspirin EC tablet 81 mg, Daily  atorvastatin (LIPITOR) tablet 20 mg, Nightly  doxazosin (CARDURA) tablet 4 mg, BID  apixaban (ELIQUIS) tablet 5 mg, BID  pantoprazole (PROTONIX) tablet 40 mg, QAM AC  sodium chloride flush 0.9 % injection 5-40 mL, 2 times per day  sodium chloride flush 0.9 % injection 5-40 mL, PRN  0.9 % sodium chloride infusion, PRN  ondansetron (ZOFRAN-ODT) disintegrating tablet 4 mg, Q8H PRN   Or  ondansetron (ZOFRAN) injection 4 mg, Q6H PRN  polyethylene glycol (GLYCOLAX) packet 17 g, Daily PRN  acetaminophen (TYLENOL) tablet 650 mg, Q6H PRN   Or  acetaminophen (TYLENOL) suppository 650 mg, Q6H PRN  ceFAZolin (ANCEF) 1,000 mg in dextrose 5 % 50 mL IVPB (mini-bag), Q12H  glucose (GLUTOSE) 40 % oral gel 15 g, PRN  dextrose 50 % IV solution, PRN  glucagon (rDNA) injection 1 mg, PRN  dextrose 5 % solution, PRN  escitalopram (LEXAPRO) tablet 10 mg, Daily  vancomycin (VANCOCIN) intermittent dosing (placeholder), See Admin Instructions  insulin lispro (1 Unit Dial) 0-6 Units, TID WC  insulin lispro (1 Unit Dial) 0-3 Units, Nightly          Physical exam:     Vitals:  BP (!) 162/79   Pulse 57   Temp 98.4 °F (36.9 °C) (Oral)   Resp 16   Ht 5' 2\" (1.575 m)   Wt 218 lb 0.6 oz (98.9 kg)   LMP 10/23/2015   SpO2 98%   BMI 39.88 kg/m²   Constitutional:  OAA X3 NAD  Skin: no rash, turgor wnl  Heent:  eomi, mmm  Neck: no bruits or jvd noted  Cardiovascular:  S1, S2 without m/r/g  Respiratory: CTA B without w/r/r  Abdomen:  +bs, soft, nt, nd  Ext: + lower extremity edema      Data:   Labs:  CBC:   Recent Labs     06/18/21  0541 06/18/21  2127 06/19/21  0538 06/20/21  0711   WBC 7.7  --  8.1 7.2   HGB 6.8* 8.2* 8.2* 8.6*     --  280 279     BMP:    Recent Labs     06/18/21  0541 06/19/21  0538 06/20/21  0148  135* 137   K 4.4 4.6 5.2*    104 106   CO2 24 23 24   BUN 35* 31* 29*   CREATININE 2.3* 2.0* 2.0*   GLUCOSE 65* 190* 263*     Ca/Mg/Phos:   Recent Labs     06/18/21  0541 06/19/21  0538 06/20/21  0711   CALCIUM 8.2* 8.2* 8.4   MG 2.20 2.10 2.20   PHOS 3.7 3.2 3.1     Hepatic:   Recent Labs     06/17/21  1531   AST 16   ALT 11   BILITOT <0.2   ALKPHOS 149*     Troponin: No results for input(s): TROPONINI in the last 72 hours. BNP: No results for input(s): BNP in the last 72 hours. Lipids: No results for input(s): CHOL, TRIG, HDL, LDLCALC, LABVLDL in the last 72 hours. ABGs: No results for input(s): PHART, PO2ART, NLA5OLK in the last 72 hours. INR: No results for input(s): INR in the last 72 hours. UA:No results for input(s): Leana Rudder, GLUCOSEU, BILIRUBINUR, Thomasenia Handler, BLOODU, PHUR, PROTEINU, UROBILINOGEN, NITRU, LEUKOCYTESUR, LABMICR, URINETYPE in the last 72 hours. Urine Microscopic: No results for input(s): LABCAST, BACTERIA, COMU, HYALCAST, WBCUA, RBCUA, EPIU in the last 72 hours. Urine Culture: No results for input(s): LABURIN in the last 72 hours. Urine Chemistry:   Recent Labs     06/18/21  1000   PROTEINUR 177.00*  0.177             IMAGING:  MRI LUMBAR SPINE W WO CONTRAST   Final Result      1. No suspicious osseous lesion. 2. Osseous hemangioma L2 vertebral body. 3. Multilevel degenerative disc disease and superimposed epidural lipomatosis causing spinal canal compromise. 4. Severe thecal sac effacement at L5-S1. Moderate thecal sac effacement at L3-4 and L4-5.   5. No significant interval change since 2015. XR CHEST PORTABLE   Final Result      1. No acute disease. CT ABDOMEN PELVIS WO CONTRAST Additional Contrast? None   Final Result   1. Skin and subcutaneous thickening/infiltration most prominently noted of the anterior abdominal wall. Clinical correlation is recommended for cellulitis.  No drainable or organized fluid collection. 2. Findings agree representing pancreatic pseudocysts or other benign entity. This is unchanged in size when compared with the prior study of 2016.   3. Lytic lesion of the L2 vertebral body which is new from the prior study. Metastasis or myeloma are favored entities, although this is indeterminant. MRI of the lumbar spine without and with contrast may be beneficial for further characterization. 4. Bilateral inguinal lymphadenopathy. This may be reactive. 5. Cholelithiasis. Assessment/Plan   1. FAHEEM on CKD 4 - baseline Cr 1.9   2.3->2  No UA    2. HTN    3. Anemia    4. Acid- base/ Electrolyte imbalance     5. Cellulitis     6.  Anasarca     7 Hx of DVT on eliquis     Plan   - IV abx   - ur studies   - inc lasix   - No IVF   - optimize BP meds  - dose diuretics per UO and renal function   - labs in am     Recommend to dose adjust all medications  based on renal functions  Maintain SBP> 90 mmHg   Daily weights   AVOID NSAIDs  Avoid Nephrotoxins  Monitor Intake/Output  Call if significant decrease in urine output                 Thank you for allowing us to participate in care of Herlinda Rangel MD  Feel free to contact me   [facility] [address]  Office : [phone]  Fax :[phone]

## 2021-06-20 NOTE — PROGRESS NOTES
Clinical Pharmacy Progress Note    Admit date: 6/17/2021     Subjective/Objective:  Pt is a 62yof with PMHx that includes DM, asthma, pancreatitis with pseudocyst, DVT, carpal tunnel, chronic back pain, neuropathy, HTN, HLD, and CKD 3 (baseline SCr 1.9-2.1) who is admitted with cellulitis under breasts, left lower abdomen, and groin. Of note, patient did just finish a course of Augmentin about 1.5 weeks ago for LE cellulitis. Pt also found to have FAHEEM on CKD, hypoglycemia, anemia, and L2 vertebral body lytic lesion. Pharmacy is consulted to dose Vancomycin per Dr. Shane Sam    Current antibiotics:  Cefazolin 1000mg IV q12h (6/17-current) - day #4  Vancomycin - Pharmacy to dose - day #4   Intermittent doses (6/17 - current)    Date Vancomycin level Vancomycin dose   6/17  1500 mg   6/18 25.8 mcg/mL --   6/19 10.1 mcg/mL 1000 mg   6/20 15 mcg/mL 750 mg         Recent Labs     06/19/21  0538 06/20/21  0711   * 137   K 4.6 5.2*    106   CO2 23 24   BUN 31* 29*   CREATININE 2.0* 2.0*   GLUCOSE 190* 263*     Estimated Creatinine Clearance: 34 mL/min (A) (based on SCr of 2 mg/dL (H)). Lab Results   Component Value Date    WBC 7.2 06/20/2021    HGB 8.6 (L) 06/20/2021    HCT 26.0 (L) 06/20/2021    MCV 82.0 06/20/2021     06/20/2021       Height:  5' 2\" (157.5 cm)  Weight:  218 lb 0.6 oz (98.9 kg)    Culture results:  Blood (6/17) = No growth  Groin wound (6/19) = In process    Prophylaxis:  VTE:  Apixaban  GI:  Pantoprazole      Assessment/Plan:  1)  Cellulitis under breasts, groin, and left lower abdomen:  Cefazolin + Vancomycin  · Vancomycin - Pharmacy to dose  · Will dose intermittently given slight FAHEEM on CKD 3. Renal function stable today with SCr 2.3-->2-->2 (baseline ~1.6)  · Random level this AM = 15 mcg/mL after 1 g IV x1 dose yesterday.   · Will re-dose with 750 mg IV x1 dose and check random level tomorrow AM.  · Clinical condition will be monitored closely, and levels / doses will be ordered as appropriate.     Please call with questions--  Armando Lopez, Asher  PGY1 Pharmacy Resident  6/20/2021 8:19 AM  [id number]

## 2021-06-21 NOTE — PROGRESS NOTES
Pt alert and oriented. BP elevated. MD notified, BP medications administered as ordered. Pt ambulating in the room. Pt had good amount of UO today. Pt in bed call lights within reach. Fall precautions provided.

## 2021-06-21 NOTE — CARE COORDINATION
Case Management Assessment            Discharge Note                    Date / Time of Note: 6/21/2021 9:54 AM                  Discharge Note Completed by: Estelle Montalvo RN    Patient Name: Airam Vila   YOB: 1965  Diagnosis: Cellulitis [L03.90]   Date / Time: 6/17/2021  3:36 PM    Current PCP: Sebas Cheney MD  Clinic patient: No    Hospitalization in the last 30 days: No    Advance Directives:  Code Status: Full Code  [place] DNR form completed and on chart: Not Indicated    Financial:  Payor: Riddhi Franks / Plan: Carmen Wolf / Product Type: *No Product type* /      Pharmacy:    [facility]  [address] Rashmi Haleyge [phone] - F [phone]  [facility] [address]  Phone: [phone] Fax: [phone]      Assistance purchasing medications?: Potential Assistance Purchasing Medications: No  Assistance provided by Case Management: None at this time    Does patient want to participate in local refill/ meds to beds program?: Not Assessed    Meds To Beds General Rules:  1. Can ONLY be done Monday- Friday between 8:30am-5pm  2. Prescription(s) must be in pharmacy by 3pm to be filled same day  3. Copy of patient's insurance/ prescription drug card and patient face sheet must be sent along with the prescription(s)  4. Cost of Rx cannot be added to hospital bill. If financial assistance is needed, please contact unit  or ;  or  CANNOT provide pharmacy voucher for patients co-pays  5.  Patients can then  the prescription on their way out of the hospital at discharge, or pharmacy can deliver to the bedside if staff is available. (payment due at time of pick-up or delivery - cash, check, or card accepted)     Able to afford home medications/ co-pay costs: Yes    ADLS:  Current PT AM-PAC Score: 22 /24  Current OT AM-PAC Score: 20 /24      DISCHARGE Disposition: Home- No Services Needed    LOC at discharge: Not Applicable  EBONY Completed: Not Indicated    Notification completed in HENS/PAS?:  Not Applicable    IMM Completed:   Yes, Case management has presented and reviewed IMM letter #2 to the patient and/or family/ POA. Patient and/or family/POA verbalized understanding of their medicare rights and appeal process if needed. Patient and/or family/POA has signed, initialed and placed today's date (6/21/21) and time (0504) on IMM letter #2 on the the appropriate lines. Patient and/or family/POA, copy of letter offered and they are aware that this original copy of IMM letter #2 is available prior to discharge from the paper chart on the unit. Electronic documentation has been entered into epic for IMM letter #2 and original paper copy has been added to the paper chart at the nurses station. Transportation:  Transportation PLAN for discharge: family   Mode of Transport: [employer] ordered at discharge: Not [address]: Not Applicable  Orders faxed: No    Durable Medical Equipment:  DME Provider: none  Equipment obtained during hospitalization:     Home Oxygen and Respiratory Equipment:  Oxygen needed at discharge?: Not [address]: Not Applicable  Portable tank available for discharge?: Not Indicated    Dialysis:  Dialysis patient: No    Dialysis Center:  Not Applicable        Additional CM Notes: Pt from home with family will return home no needs, father Ed will drive home,     The Plan for Transition of Care is related to the following treatment goals of Cellulitis [L03.90]    The Patient and/or patient representative Pauline Rubio and her family were provided with a choice of provider and agrees with the discharge plan Yes    Freedom of choice list was provided with basic dialogue that supports the patient's individualized plan of care/goals and shares the quality data associated with the providers.  Not Indicated    Care Transitions patient: Lianna Frey RN  [facility]  Case Management Department  Ph: [phone]  Fax: [phone]

## 2021-06-21 NOTE — DISCHARGE SUMMARY
I have seen the patient independently from the resident . I discussed the care with the resident. I personally reviewed the HPI, PH, FH, SH, ROS and medications. I repeated pertinent portions of the examination and reviewed the relevant imaging and laboratory data. I agree with the findings, assessment and plan as documented in the the resident, Dr Satnam Ivan note.       Benita Bean M.D.

## 2021-06-21 NOTE — PROGRESS NOTES
Clinical Pharmacy Progress Note    Admit date: 6/17/2021     Subjective/Objective:  Pt is a 62yof with PMHx that includes DM, asthma, pancreatitis with pseudocyst, DVT, carpal tunnel, chronic back pain, neuropathy, HTN, HLD, and CKD 3 (baseline SCr 1.9-2.1) who is admitted with cellulitis under breasts, left lower abdomen, and groin. Of note, patient did just finish a course of Augmentin about 1.5 weeks ago for LE cellulitis. Pt also found to have FAHEEM on CKD, hypoglycemia, anemia, and L2 vertebral body lytic lesion. Pharmacy is consulted to dose Vancomycin per Dr. Sally Poole    Current antibiotics:  Cefazolin 1000mg IV q12h (6/17-current) - day #5  Vancomycin - Pharmacy to dose - day #5   Intermittent doses (6/17 - current)  Date Vancomycin level Vancomycin dose   6/17  1500 mg   6/18 25.8 mcg/mL --   6/19 10.1 mcg/mL 1000 mg   6/20 15 mcg/mL 750 mg   6/21 15.5 mcg/mL Scheduled    750 mg IV q24h (6/21 - current)       Recent Labs     06/20/21  0711 06/20/21  1901 06/21/21  0458     --  133*   K 5.2* 4.9 5.1     --  102   CO2 24  --  20*   BUN 29*  --  34*   CREATININE 2.0*  --  2.1*   GLUCOSE 263*  --  384*     Estimated Creatinine Clearance: 32 mL/min (A) (based on SCr of 2.1 mg/dL (H)). Lab Results   Component Value Date    WBC 7.4 06/21/2021    HGB 8.5 (L) 06/21/2021    HCT 25.1 (L) 06/21/2021    MCV 82.6 06/21/2021     06/21/2021     Height:  5' 2\" (157.5 cm)  Weight:  218 lb 0.6 oz (98.9 kg)    Culture results:  Blood (6/17) = No growth  Groin wound (6/19) = 1+ Epithelial Cells, 1+ Gram negative rods, 1+ Gram positive cocci       Assessment/Plan:  1)  Cellulitis under breasts, groin, and left lower abdomen:  Cefazolin + Vancomycin  · Vancomycin - Pharmacy to dose  · Renal function stable with SCr ~2 mg/dL x3 days. · Will schedule vancomycin 750 mg IV q24h. · Clinical condition will be monitored closely, and levels / doses will be ordered as appropriate.     Please call with questions--  Scar Coe, PharmD  PGY1 Pharmacy Resident  6/21/2021 8:09 AM  [id number]

## 2021-06-21 NOTE — PROGRESS NOTES
Nephrology Note                                                                                                                                                                                                                                                                                                                                                               Office : [phone]     Fax :[phone]              Patient's Name: Airam Vila  12:27 PM  2021    Reason for Consult: FAHEEM  Requesting Physician:  Sebas Cheney MD      Chief Complaint:  Abd pain and swelling      History of Present Ilness:    Airam Vila is a 60 y.o. female with a PMHx of HTN, CKD 4, T2DM (s/p b/l toe amputations), DVT (on eliquis) who p/w pain in abdomen, under breasts and in groin. Patient is very sleepy. I had to sternal rub her to finally get her to wake up but when she woke up she was totally alert and oriented x4. She states she is a really deep sleeper. Patient states that she recently found out that her brother passed away on Memorial Day weekend and therefore has not been sleeping well recently. Patient finished a 10-day course of Augmentin 1.5 weeks ago for lower extremity cellulitis. She states that the redness in all 3 areas began around 1 week ago and does have a foul smell. She did endorse some shortness of breath yesterday that improved with her inhalers. Patient states that she took her insulin this morning however did not eat anything afterwards.   She denied any trauma to any of the areas, fevers/chills, no nausea or vomiting, hematuria, hematochezia, melena, hemoptysis, chest pain, dysuria, diarrhea or constipation    21    Feels fair  UO dec  No shortness of breath     Past Surgical History:   Procedure Laterality Date     SECTION  unknown    FOOT DEBRIDEMENT Right 2019    INCISION AND DRAINAGE WITH APPLICATION OF STRAVIX GRAFT RIGHT FOOT performed by Suzanne Avila DPM at Recent Labs     06/19/21  0538 06/20/21  0711 06/20/21  1901 06/21/21  0458   * 137  --  133*   K 4.6 5.2* 4.9 5.1    106  --  102   CO2 23 24  --  20*   BUN 31* 29*  --  34*   CREATININE 2.0* 2.0*  --  2.1*   GLUCOSE 190* 263*  --  384*     Ca/Mg/Phos:   Recent Labs     06/19/21  0538 06/20/21  0711 06/21/21  0458   CALCIUM 8.2* 8.4 8.4   MG 2.10 2.20 2.10   PHOS 3.2 3.1 3.2     Hepatic:   No results for input(s): AST, ALT, ALB, BILITOT, ALKPHOS in the last 72 hours. Troponin: No results for input(s): TROPONINI in the last 72 hours. BNP: No results for input(s): BNP in the last 72 hours. Lipids: No results for input(s): CHOL, TRIG, HDL, LDLCALC, LABVLDL in the last 72 hours. ABGs: No results for input(s): PHART, PO2ART, CUY1GTX in the last 72 hours. INR: No results for input(s): INR in the last 72 hours. UA:No results for input(s): Coretha Sabetha, GLUCOSEU, BILIRUBINUR, Glenora Newer, BLOODU, PHUR, PROTEINU, UROBILINOGEN, NITRU, LEUKOCYTESUR, LABMICR, URINETYPE in the last 72 hours. Urine Microscopic: No results for input(s): LABCAST, BACTERIA, COMU, HYALCAST, WBCUA, RBCUA, EPIU in the last 72 hours. Urine Culture: No results for input(s): LABURIN in the last 72 hours. Urine Chemistry:   No results for input(s): Rojas Dom, PROTEINUR, NAUR in the last 72 hours. IMAGING:  MRI LUMBAR SPINE W WO CONTRAST   Final Result      1. No suspicious osseous lesion. 2. Osseous hemangioma L2 vertebral body. 3. Multilevel degenerative disc disease and superimposed epidural lipomatosis causing spinal canal compromise. 4. Severe thecal sac effacement at L5-S1. Moderate thecal sac effacement at L3-4 and L4-5.   5. No significant interval change since 2015. XR CHEST PORTABLE   Final Result      1. No acute disease. CT ABDOMEN PELVIS WO CONTRAST Additional Contrast? None   Final Result   1.   Skin and subcutaneous thickening/infiltration most prominently

## 2021-06-21 NOTE — PLAN OF CARE
Problem: Falls - Risk of:  Goal: Will remain free from falls  Description: Will remain free from falls  6/21/2021 0622 by Robson Weston RN  Outcome: Met This Shift  6/20/2021 2010 by Jori Diamond RN  Note: Pt instructed about fall risk. Pt using the call light properly. Pt in bed, call lights within rich, bed in lower position.    Goal: Absence of physical injury  Description: Absence of physical injury  Outcome: Met This Shift     Problem: Skin Integrity:  Goal: Absence of new skin breakdown  Description: Absence of new skin breakdown  6/21/2021 0622 by Robson Weston RN  Outcome: Met This Shift  6/20/2021 2010 by Jori Diamond RN  Outcome: Ongoing     Problem: Pain:  Goal: Pain level will decrease  Description: Pain level will decrease  Outcome: Met This Shift  Goal: Control of acute pain  Description: Control of acute pain  Outcome: Met This Shift  Goal: Control of chronic pain  Description: Control of chronic pain  Outcome: Met This Shift     Problem: SAFETY  Goal: Free from accidental physical injury  Outcome: Met This Shift

## 2021-06-21 NOTE — PROGRESS NOTES
Pt a/o x4, BP elevated and managed with scheduled meds- all other VSS. Redness to bilat breasts/groin improved- treated with miconazole powder. Abdomen soft and non-tender with active bowel sounds, pt tolerating diet and denies nausea. Wounds to bilat feet dressed with ace bandage- CDI. Podiatry and wound RN saw pt at bedside- pt to follow up with podiatry outpt. Pt is up SBA with cane, gait slow and steady. Voiding appropriately. Intermittent IV abx infusing through PIV. All needs met at this time, will continue with plan of care.

## 2021-06-21 NOTE — CONSULTS
Department of Podiatry Consult Note  Resident       Reason for Consult:  Right foot wound  Requesting Physician:  Akua Soares MD    CHIEF COMPLAINT: Right foot wound    HISTORY OF PRESENT ILLNESS:                The patient is a 60 y.o. female with significant past medical history as listed below. Podiatry was consulted for right foot wound. Patient well-known to podiatry service and follows in [address] [place] [address] podiatry clinic with Dr. Trent Sanchez. Patient was scheduled for outpatient appointment this morning. Patient reports that her dressing was being changed by her daughter at home of Betadine and dry dressing. Patient denies any increased pain or drainage from her foot wound. Patient denies fever, chills, nausea, vomiting, shortness of breath, chest pain. Patient has no other pedal complaints at this time.     Past Medical History:        Diagnosis Date    Amenorrhea     Asthma 2004    Bacterial vaginosis 2008    Carpal tunnel syndrome 2007    [facility]     Diabetes mellitus type II 2007    10/1/20 pt states does accucheck 2x/day at home    Diabetic neuropathy ([facility] 2010    DVT (deep venous thrombosis) ([facility] 2004    Dyslipidemia 2009    Dyspareunia 2009    ETOH abuse 2007    Feet clawing     HTN (hypertension)     Hx of blood clots     Hyperlipidemia     MRSA (methicillin resistant staph aureus) culture positive 2017; 2017    foot; leg     Neuropathy 2009    polyneuropathy    Pain, back 2008    Pancreatitis 2004    Pseudocyst, pancreas 2004    Scalp lesion 2007    Tinea pedis     Tobacco abuse 2008    Uses walker     Uses wheelchair     also uses walker    Wears dentures        Past Surgical History:        Procedure Laterality Date     SECTION  unknown    FOOT DEBRIDEMENT Right 2019    INCISION AND DRAINAGE WITH APPLICATION OF STRAVIX GRAFT RIGHT FOOT performed by Milo Guzman Celio Arrington DPM at [address] [place] [address] Right 6/6/2019    RIGHT FOOT INCISION AND DRAINAGE WITH STAGING TRANSMETATARSAL AMPUTATION performed by Araceli Elizalde DPM at [address] [place] [address] Right 7/5/2019    RIGHT FOOT DEBRIDEMENT INCISION AND DRAINAGE, OPEN DIABETIC FOOT ULCER WITH GRAFT PLACEMENT performed by Araceli Elizalde DPM at [address] [place] [address] Left 10/23/2019    LEFT FOOT INCISION AND DRAINAGE , DEBRIDEMENT OF OPEN WOUND, APPLICATION OF STRAVIX GRAFT performed by Araceli Elizalde DPM at [address] [place] [address] Right 3/29/2021    INCISION AND DRAINAGE, DEBRIDEMENT OF DIABETIC WOUND WITH PLACEMENT OF STRAVIX GRAFT RIGHT FOOT performed by Araceli Elizalde DPM at [facility] Left     from falling off ladder -- has screws in place pt report    OTHER SURGICAL HISTORY Left 05/25/2016    I & D left foot    OTHER SURGICAL HISTORY Right 10/20/2017    RIGHT GASTROC LENGTHENING ENDOSCOPIC, INJECTION OF AMNI GRAFT    OTHER SURGICAL HISTORY Right 04/26/2018    Diabetic foot ulcer I&D w/ integra graft application    VT DEBRIDEMENT, SKIN, SUB-Q TISSUE,MUSCLE,BONE,=<20 SQ CM Right 8/17/2018    RIGHT FOOT DEBRIDEMENT INCISION AND DRAINAGE, PARTIAL 5TH RAY AMPUTATION performed by Araceli Elizalde DPM at [address] / [address]  7/7003    cryotherapy done on lesion    TOE AMPUTATION Left 02/24/2017    AMPUTATION LEFT GREAT TOE                 TONSILLECTOMY         Allergies:   Sulfa antibiotics    Medications:   Home Meds  No current facility-administered medications on file prior to encounter. Current Outpatient Medications on File Prior to Encounter   Medication Sig Dispense Refill    methadone (DOLOPHINE) 10 MG/ML solution Take 140 mg by mouth daily.  Verified dose with [facility] ([phone]) 6/18/21      amitriptyline (ELAVIL) 100 MG tablet TAKE 1 TABLET BY MOUTH EVERY NIGHT AT BEDTIME 30 tablet 2    metoprolol succinate (TOPROL XL) 100 MG extended release tablet Take 1 tablet by mouth daily 90 tablet 1    doxazosin (CARDURA) 4 MG tablet Take 1 tablet by mouth 2 times daily 30 tablet 1    escitalopram (LEXAPRO) 10 MG tablet TAKE 1 TABLET BY MOUTH DAILY 30 tablet 1    ELIQUIS 5 MG TABS tablet TAKE 1 TABLET BY MOUTH TWICE DAILY 60 tablet 1    atorvastatin (LIPITOR) 20 MG tablet Take 1 tablet by mouth nightly 90 tablet 1    gabapentin (NEURONTIN) 400 MG capsule Take 1 capsule by mouth 2 times daily for 90 days. 180 capsule 0    furosemide (LASIX) 20 MG tablet Take 1 tablet by mouth daily (Patient taking differently: Take 20 mg by mouth 2 times daily ) 90 tablet 1    ammonium lactate (LAC-HYDRIN) 12 % lotion Apply topically daily. (Patient taking differently: Apply topically as needed Apply topically daily. ) 1 Bottle 0    omeprazole (PRILOSEC) 20 MG delayed release capsule Take 1 capsule by mouth every morning 30 capsule 5    insulin glargine (BASAGLAR KWIKPEN) 100 UNIT/ML injection pen Inject 50 Units into the skin daily 5 pen 5    insulin aspart (NOVOLOG FLEXPEN) 100 UNIT/ML injection pen Inject 8 Units into the skin 3 times daily (before meals) (Patient taking differently: Inject 8 Units into the skin 3 times daily (before meals) Indications: states follows sliding scale ) 5 pen 3    nystatin (MYCOSTATIN) 241136 UNIT/GM powder Apply topically 2 times daily Apply to right breast and groin area BID 30 g 3    aspirin EC 81 MG EC tablet Take 1 tablet by mouth daily 60 tablet 3    albuterol sulfate  (90 Base) MCG/ACT inhaler Inhale 2 puffs into the lungs every 6 hours as needed for Wheezing (Patient taking differently: Inhale 2 puffs into the lungs every 6 hours as needed for Wheezing ) 1 Inhaler 5    Accu-Chek Softclix Lancets MISC 1 strip by Does not apply route 2 times daily Check before breakfast and before going to bed 200 each 5    blood glucose test strips (TRUE METRIX BLOOD GLUCOSE TEST) strip 1 each by In history. Social History:   TOBACCO:   reports that she quit smoking about 3 years ago. Her smoking use included cigarettes. She has a 30.00 pack-year smoking history. She has never used smokeless tobacco.  ETOH:   reports no history of alcohol use. DRUGS:   reports no history of drug use. REVIEW OF SYSTEMS:    As above. PHYSICAL EXAM:      Vitals:    BP (!) 163/69   Pulse 66   Temp 98.2 °F (36.8 °C) (Oral)   Resp 16   Ht 5' 2\" (1.575 m)   Wt 218 lb 0.6 oz (98.9 kg)   LMP 10/23/2015   SpO2 94%   BMI 39.88 kg/m²     LABS:   Recent Labs     06/20/21  0711 06/21/21  0458   WBC 7.2 7.4   HGB 8.6* 8.5*   HCT 26.0* 25.1*    290     Recent Labs     06/21/21  0458   *   K 5.1      CO2 20*   PHOS 3.2   BUN 34*   CREATININE 2.1*     No results for input(s): PROT, INR, APTT in the last 72 hours. VASCULAR: DP and PT pulses are palpable 1/4 b/l. CFT is brisk to the right TMA distal stump site RLE and remaning digits on the left foot. Skin temperature warm to cool from proximal to distal with no focal calor noted. +2 pitting edema noted to b/l LE.     NEUROLOGIC: Protective sensation is diminished at all pedal sites b/l. Gross and epicritic sensation is diminished b/l.      DERMATOLOGIC: Dermatological changes noted B/L LE.   LLE:  Hyperkeratotic tissue noted plantar aspect first metatarsal head. Close soft tissue envelope to left lower extremity. Left foot toenails 3-5 discolored yellow, but within limits on length and thickness. Left foot 2nd toenail is absent, with healthy granular nail bed present. Webspaces left foot 2-4 discolored with betadine orange, macerated, however intact. Diffuse flaky/xerotic tissue noted to B/L LE.     RLE:  Full-thickness ulceration noted to the plantar aspect of the right foot near cuboid region with granular, fibrotic base with macerated periwound, measures 4.5 cm x 3.3cm x 0.2cm. No erythema noted. Serous drainage noted. No purulent drainage noted.

## 2021-06-21 NOTE — PROGRESS NOTES
Physician Progress Note      Trinidad Shine  [facility] #:                  [id number]  :                       1965  ADMIT DATE:       2021 3:36 PM  [address] [place] DATE:  RESPONDING  PROVIDER #:        Kirsten Cantrell MD          QUERY TEXT:    Pt admitted with cellulitis under breasts, on abd and groin. Pt noted to have   DM. If possible, please document in progress notes and discharge summary the   relationship, if any, between cellulitis and DM. The medical record reflects the following:  Risk Factors: 61 yo obese female w/ DM  Clinical Indicators: Per H&P: Cellulitis under breasts, LL abdomen and groin. Patient is diabetic, obese. T2DM. A1C  6.8. Treatment: SSI, IV Cefazolin, IV Vanc  Options provided:  -- Abdominal, groin and bilat breast cellulitis unrelated to Diabetes  -- Abdominal, groin and bilat breast cellulitis associated with Diabetes  -- Other - I will add my own diagnosis  -- Disagree - Not applicable / Not valid  -- Disagree - Clinically unable to determine / Unknown  -- Refer to Clinical Documentation Reviewer    PROVIDER RESPONSE TEXT:    Abdominal, groin and bilat breast cellulitis associated with Diabetes.     Query created by: Win Santiago on 2021 5:51 AM      Electronically signed by:  Kirsten Cantrell MD 2021 8:25 AM

## 2021-06-21 NOTE — PLAN OF CARE
Problem: Falls - Risk of:  Goal: Will remain free from falls  Description: Will remain free from falls  Note: Pt instructed about fall risk. Pt using the call light properly. Pt in bed, call lights within rich, bed in lower position.       Problem: Skin Integrity:  Goal: Absence of new skin breakdown  Description: Absence of new skin breakdown  Outcome: Ongoing

## 2021-06-28 NOTE — PROGRESS NOTES
Department of Podiatry  Resident Progress Note    Tylor Diver  Allergies: Sulfa antibiotics    SUBJECTIVE  The patient is a 60 y.o. female who presents with right foot wound. Patient reports changing dressing daily with Betadine. Patient reports slight redness to left foot, possibly due to rubbing in cam boot. She reports she has been nonweightbearing in cam boot. She reports being diabetic, unsure of her last blood glucose. Patient denies fever, chills, nausea, vomiting, shortness of breath, chest pain. Patient has no other pedal complaints at this time. Past Medical History:        Diagnosis Date    Amenorrhea     Asthma 05/14/2004    Bacterial vaginosis 04/2008    Carpal tunnel syndrome 05/2007    COPD (chronic obstructive pulmonary disease) ([address]     Diabetes mellitus type II 08/2007    10/1/20 pt states does accucheck 2x/day at home    Diabetic neuropathy ([address] 34/3445    Diastolic CHF ([address]     DVT (deep venous thrombosis) ([facility] 03/2004    Dyslipidemia 05/2009    Dyspareunia 05/2009    ETOH abuse 03/04/2007    Feet clawing     HTN (hypertension)     Hx of blood clots     Hyperlipidemia     MRSA (methicillin resistant staph aureus) culture positive 11/06/2017; 11/17/2017    foot; leg     Neuropathy 05/2009    polyneuropathy    Pain, back 04/2008    Pancreatitis 05/14/2004    Pseudocyst, pancreas 05/14/2004    Scalp lesion 08/2007    Tinea pedis     Tobacco abuse 03/2008    Uses walker     Uses wheelchair     also uses walker    Wears dentures        REVIEW OF SYSTEMS:  As above    OBJECTIVE  Patient presents to clinic with assistance of a wheelchair, accompanied by family member. She is wearing a CAM boot to bilateral lower extremity. She is alert and oriented to person, place, and time and appears to be in no acute distress.     VASCULAR: DP and PT pulses are palpable 1/4 b/l. CFT is brisk to the right TMA distal stump site RLE and remaning digits on the left foot.  Skin temperature warm to cool from proximal to distal with no focal calor noted. +2 pitting edema noted to b/l LE.     NEUROLOGIC: Protective sensation is diminished at all pedal sites b/l. Gross and epicritic sensation is diminished b/l.      DERMATOLOGIC: Dermatological changes noted B/L LE.   LLE:  Hyperkeratotic tissue noted plantar aspect first metatarsal head. Close soft tissue envelope to left lower extremity. Left foot toenails 3-5 discolored yellow, but within limits on length and thickness. Left foot 2nd toenail is absent, with healthy granular nail bed present. Webspaces left foot 2-4 discolored with betadine orange, macerated, however intact. Diffuse flaky/xerotic tissue noted to B/L LE.     RLE:  Full-thickness ulceration noted to the plantar aspect of the right foot near cuboid region with granular, fibrotic base with macerated periwound, measures 4.1cm x 3.2cm x 0.2cm. No erythema noted. Serous drainage noted. No purulent drainage noted. Bluish discoloration noted to periwound 2/2 wound wicking foam pad. Wound does not probe to bone, tunnel, or track. No fluctuance or crepitus noted. No malodor noted from wound.    Patient provided verbal consent for today's photos. MUSCULOSKELETAL: Muscle strength is 4/5 for all pedal groups tested. No pain with palpation of b/l LE. Ankle joint ROM is decreased in dorsiflexion with the knee extended. 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 ASSESSMENT  1. Full-thickness ulceration; Carpenter 2; right lower extremity  2. Onychomycosis x 3, Left  3. Macerated webspaces x 3: Left foot  4. Diabetes Mellitus Type II uncontrolled with peripheral neuropathy  5.  History of non-compliance with weightbearing status and dressing changes    PLAN  -Evaluation and management x 15 minutes and greater than 50% of the time spent explaining the etiology and treatment with the patient.   -Using a #15 blade, I excisionally debrided the hyperkeratotic right foot wound down to and including muscle. Less than 10 cc of bleeding noted. Hemostasis was achieved with direct pressure. Patient tolerated well. -Right lower extremity dressing applied consisting of wet to dry, gauze, Kerlix, Ace bandage  -Left lower extremity dressing: betadine to webspaces, Ace bandage  -Instructed patient to continue to perform dressing changes daily b/l LE consisting of the dressings described above.  Patient understood.  -Ordered venous duplex doppler of right LE, will follow up results.   -Ordered MRI right foot  -Patient instructed to be continued strict nonweightbearing to right lower extremity in bilateral CAM boot. Patient understood.  -Return to clinic in 2 weeks with clinic being off for the July 4th holiday.       Discussed assessment and plan with RAMONE Roberts DPM  06/28/21  8:57 AM

## 2021-07-08 NOTE — TELEPHONE ENCOUNTER
PT MICHELETM STATING SHE NEED HER MEDICATION LIST WHEN SHE COMES IN FOR NEXT [facility] TO UPDATE METHADONE

## 2021-07-09 NOTE — TELEPHONE ENCOUNTER
Unable to reach her by phone, but will give her the medication list when she comes to clinic on Monday

## 2021-07-14 NOTE — TELEPHONE ENCOUNTER
Refill Escitalopram  Last OV 5-24-21 Dr. Ana Cristina Benites  Next appt.  not sched for medical/follows closely with podiatry clinic

## 2021-07-19 NOTE — PROGRESS NOTES
Department of Podiatry  Resident Progress Note    Eugenia Reynoso  Allergies: Sulfa antibiotics    SUBJECTIVE  The patient is a 60 y.o. female who presents with right foot wound. Patient reports changing dressing daily with Betadine. Patient reports of a new wound to left foot, possibly due to rubbing in cam boot, thinks it may be infected. Reports that she believes it started as a blister and popped. Patient unsure of drainage. Denies pain to new wound. She reports she has been nonweightbearing in cam boot. She reports being diabetic, unsure of her last blood glucose. Patient denies fever, chills, nausea, vomiting, shortness of breath, chest pain. Patient has no other pedal complaints at this time. Past Medical History:        Diagnosis Date    Amenorrhea     Asthma 05/14/2004    Bacterial vaginosis 04/2008    Carpal tunnel syndrome 05/2007    COPD (chronic obstructive pulmonary disease) ([address]     Diabetes mellitus type II 08/2007    10/1/20 pt states does accucheck 2x/day at home    Diabetic neuropathy ([address] 38/6229    Diastolic CHF ([address]     DVT (deep venous thrombosis) ([facility] 03/2004    Dyslipidemia 05/2009    Dyspareunia 05/2009    ETOH abuse 03/04/2007    Feet clawing     HTN (hypertension)     Hx of blood clots     Hyperlipidemia     MRSA (methicillin resistant staph aureus) culture positive 11/06/2017; 11/17/2017    foot; leg     Neuropathy 05/2009    polyneuropathy    Pain, back 04/2008    Pancreatitis 05/14/2004    Pseudocyst, pancreas 05/14/2004    Scalp lesion 08/2007    Tinea pedis     Tobacco abuse 03/2008    Uses walker     Uses wheelchair     also uses walker    Wears dentures        REVIEW OF SYSTEMS:  As above    OBJECTIVE  Patient presents to clinic with assistance of a wheelchair, accompanied by family member, wearing CAM boot to bilateral lower extremity.  She is alert and oriented to person, place, and time and appears to be in no acute distress.     VASCULAR: JERED and PT pulses are palpable 1/4 b/l. CFT is brisk to the right TMA distal stump site RLE and remaning digits on the left foot. Skin temperature warm to cool from proximal to distal with no focal calor noted RLE. Mild focal calor noted abraham-wound LLE. +2 pitting edema noted to b/l LE.     NEUROLOGIC: Protective sensation is diminished at all pedal sites b/l. Gross and epicritic sensation is diminished b/l.      DERMATOLOGIC: Dermatological changes noted B/L LE. Diffuse flaky/xerotic tissue noted to B/L LE.    LLE:  Full thickness ulceration noted to plantar lateral aspect left foot beginning distally at base of 5th digit and extending proximally plantar and lateral to the level of the mid aspect of the 5th metatarsal.  Hyperkeratotic tissue noted plantar aspect first metatarsal head. Measures 4.5 cm x 4.2 cm x 0.1 cm. Base is a mixture of necrotic and fibrotic tissue with hyperkeratotic rim. Scant caseous fibrotic/serous drainage noted. No active purulent drainage noted. Scant erythema noted periwound, extending less than 2 cm from wound. No fluctuance, crepitus, or malodor noted. Wound does not probed to bone, tunnel, or track. Left foot toenails 3-5 discolored yellow, but within limits on length and thickness. Left foot 2nd toenail is absent, with healthy granular nail bed present. Webspaces left foot 2-4 discolored with betadine orange, macerated, however intact.   Patient provided verbal consent for today's photos. RLE:  Full-thickness ulceration noted to the plantar aspect of the right foot near cuboid region with granular, fibrotic base with macerated periwound, measures 4.2cm x 3.3cm x 0.2cm. No erythema noted. Serous drainage noted. No purulent drainage noted. Wound does not probe to bone, tunnel, or track. No fluctuance or crepitus noted. No malodor noted from wound.        MUSCULOSKELETAL: Muscle strength is 4/5 for all pedal groups tested. No pain with palpation of b/l LE.  Ankle joint ROM is decreased in dorsiflexion with the knee extended. 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 ASSESSMENT  -Full-thickness ulceration; Janny Snowball 2; right lower extremity  -Full thickness ulceration, left foot, Carpenter 2  -Cellulitis, left foot  -Onychomycosis x 3, Left  -Macerated webspaces x 3: Left foot  -Diabetes Mellitus Type II uncontrolled with peripheral neuropathy  -History of non-compliance with weightbearing status and dressing changes    PLAN  -Evaluation and management x 15 minutes and greater than 50% of the time spent explaining the etiology and treatment with the patient.   -Using a #15 blade, I excisionally debrided the hyperkeratotic right foot wound down to and including muscle. Less than 10 cc of bleeding noted. Hemostasis was achieved with direct pressure. Patient tolerated well. -Using a #15 blade, excisional debridement of the left foot wound down to and including subcutaneous tissues. Less than 10 cc of bleeding noted. Hemostasis was achieved with direct pressure. Patient tolerated well. -Right lower extremity dressing applied consisting of betadine, gauze, Kerlix, Ace bandage  -Left lower extremity dressing: betadine to webspaces, betadine, gauze, kerlix, Ace bandage  -Instructed patient to continue to perform dressing changes daily b/l LE consisting of the dressings described above.  Patient understood.  -Ordered venous duplex doppler of right LE, will follow up results.   -Ordered MRI right foot  -Ordered xray left foot  -Ordered CBC  -Prescription for doxycycline sent to pharmacy  -Instructed patient to watch for signs/symptoms of infection including but not limited to fever, chills, nausea, vomiting, redness to wound and streaking up leg, purulent drainage; instructed patient to call office or present to ED if these occur.  -Patient instructed to be continued strict nonweightbearing to b/l lower extremity in bilateral CAM boot. Patient understood.  -Return to clinic in 1 week     Discussed assessment and plan with Dr. Eileen Kaminski, RAMONE Burrell DPM  07/19/21  3:01 PM

## 2021-07-19 NOTE — PATIENT INSTRUCTIONS
Podiatry Intrusctions:  Change dressing everyday with betadine, gauze, rolled gauze and ace  Take doxycycline twice a day for 10 days  Get X rays and Labs done today  Return to clinic in 1 week

## 2021-07-26 NOTE — PROGRESS NOTES
Department of Podiatry  Resident Progress Note    Chris Payer  Allergies: Sulfa antibiotics    SUBJECTIVE  The patient is a 60 y.o. female who presents with wounds to bilateral lower extremity. Patient reports changing dressings daily with Betadine to both wound and wrapping them up with gauze roll and ACE bandages. Patient states that her left foot wound is doing a lot better than last week and that it's looking a lot better. Patient states that she has been taking doxycyline and has 4 days left. She states that she was unable to obtain x-rays and labs last week due to transportation issues, but will get them this week before her next appointment next week. She reports she has been nonweightbearing in her cam boot. She reports being diabetic, unsure of her last blood glucose. Patient denies fever, chills, nausea, vomiting, shortness of breath, chest pain. Patient has no other pedal complaints at this time.       Past Medical History:        Diagnosis Date    Amenorrhea     Asthma 05/14/2004    Bacterial vaginosis 04/2008    Carpal tunnel syndrome 05/2007    COPD (chronic obstructive pulmonary disease) ([address]     Diabetes mellitus type II 08/2007    10/1/20 pt states does accucheck 2x/day at home    Diabetic neuropathy ([address] 75/0200    Diastolic CHF ([address]     DVT (deep venous thrombosis) ([address] 03/2004    Dyslipidemia 05/2009    Dyspareunia 05/2009    ETOH abuse 03/04/2007    Feet clawing     HTN (hypertension)     Hx of blood clots     Hyperlipidemia     MRSA (methicillin resistant staph aureus) culture positive 11/06/2017; 11/17/2017    foot; leg     Neuropathy 05/2009    polyneuropathy    Pain, back 04/2008    Pancreatitis 05/14/2004    Pseudocyst, pancreas 05/14/2004    Scalp lesion 08/2007    Tinea pedis     Tobacco abuse 03/2008    Uses walker     Uses wheelchair     also uses walker    Wears dentures        REVIEW OF SYSTEMS:  As above    OBJECTIVE  Patient presents to clinic noted. No malodor noted from wound.      MUSCULOSKELETAL: Muscle strength is 4/5 for all pedal groups tested. No pain with palpation of b/l LE. Ankle joint ROM is decreased in dorsiflexion with the knee extended. 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 ASSESSMENT  -Full-thickness ulceration; Douglas Jose 2; right lower extremity  -Full thickness ulceration, left foot, Carpenter 2  -Cellulitis, left foot-resolved  -Onychomycosis x 3, Left  -Macerated webspaces x 3: Left foot  -Diabetes Mellitus Type II uncontrolled with peripheral neuropathy  -History of non-compliance with weightbearing status and dressing changes    PLAN  -Evaluation and management x 15 minutes and greater than 50% of the time spent explaining the etiology and treatment with the patient.   -Using a #15 blade, I excisionally debrided the hyperkeratotic right foot wound down to and including muscle. Less than 10 cc of bleeding noted. Hemostasis was achieved with direct pressure. Patient tolerated well. -Using a #15 blade, excisional debridement of the left foot wound down to and including subcutaneous tissues. Less than 10 cc of bleeding noted. Hemostasis was achieved with direct pressure. Patient tolerated well. -Right lower extremity dressing applied consisting of betadine, gauze, Kerlix, Ace bandage  -Left lower extremity dressing: betadine to webspaces, betadine, gauze, kerlix, Ace bandage  -Instructed patient to continue to perform dressing changes daily b/l LE consisting of the dressings described above.  Patient understood.  -Ordered venous duplex doppler of right LE, will follow up results.   -Ordered MRI right foot  -Ordered xray left foot  -Ordered CBC  -Instructed patient to watch for signs/symptoms of infection including but not limited to fever, chills, nausea, vomiting, redness to wound and streaking up leg, purulent drainage; instructed patient to call office or present to ED if these occur.  -Had lengthy discussion with patient about the importance of getting X-rays and lab work before next visit, patient understood.  -Patient instructed to finish her doxycycline.  -Patient instructed to be continued strict nonweightbearing to b/l lower extremity in bilateral CAM boot. Patient understood.  -Return to clinic in 1 week     Discussed assessment and plan with RAMONE Drummond DPM  07/26/21  9:34 AM

## 2021-07-26 NOTE — PATIENT INSTRUCTIONS
Podiatry Instructions:  Remain Non weight bearing  Dress wounds with betadine soaked gauze, gauze, kerlix, and ACE  Patient to get Xrays and Labs before next visit  Return to clinic in 1 week

## 2021-08-02 NOTE — PATIENT INSTRUCTIONS
Return in 1 week . Kashif Lutz Continue current dressing changes. Start antibiotics. Reviewed with pateint and on medication sheet.

## 2021-08-02 NOTE — PROGRESS NOTES
Department of Podiatry  Resident Progress Note    Airam Vila  Allergies: Sulfa antibiotics    SUBJECTIVE  The patient is a 60 y.o. female who presents with wounds to bilateral lower extremity. Patient reports changing dressings daily with Betadine to both wound and wrapping them up with gauze roll and ACE bandages. Patient states that her left foot wound has gotten worse since her last visit. States that she would like to be put on another set of oral antibiotics. Patient states that her right foot wound is doing well, is concerned about her left foot. Patient states that she has been trying to stay off of it as much as possible. Patient states that wound could be rubbing up against her cam boot. Patient denies any other pedal complaints. Patient denies nausea, vomiting,chills, fever, shortness of breath.       Past Medical History:        Diagnosis Date    Amenorrhea     Asthma 05/14/2004    Bacterial vaginosis 04/2008    Carpal tunnel syndrome 05/2007    COPD (chronic obstructive pulmonary disease) ([address]     Diabetes mellitus type II 08/2007    10/1/20 pt states does accucheck 2x/day at home    Diabetic neuropathy ([address] 43/2526    Diastolic CHF ([address]     DVT (deep venous thrombosis) ([facility] 03/2004    Dyslipidemia 05/2009    Dyspareunia 05/2009    ETOH abuse 03/04/2007    Feet clawing     HTN (hypertension)     Hx of blood clots     Hyperlipidemia     MRSA (methicillin resistant staph aureus) culture positive 11/06/2017; 11/17/2017    foot; leg     Neuropathy 05/2009    polyneuropathy    Pain, back 04/2008    Pancreatitis 05/14/2004    Pseudocyst, pancreas 05/14/2004    Scalp lesion 08/2007    Tinea pedis     Tobacco abuse 03/2008    Uses walker     Uses wheelchair     also uses walker    Wears dentures        REVIEW OF SYSTEMS:  As above    OBJECTIVE  Patient presents to clinic with assistance of a wheelchair, accompanied by family member, wearing CAM boot to bilateral lower extremity. She is alert and oriented to person, place, and time and appears to be in no acute distress.     VASCULAR: DP and PT pulses are palpable 1/4 b/l. CFT is brisk to the right TMA distal stump site RLE and remaning digits on the left foot. Skin temperature warm to cool from proximal to distal with no focal calor noted RLE. Mild focal calor noted abraham-wound LLE. +2 pitting edema noted to b/l LE.     NEUROLOGIC: Protective sensation is diminished at all pedal sites b/l. Gross and epicritic sensation is diminished b/l.      DERMATOLOGIC: Dermatological changes noted B/L LE. Diffuse flaky/xerotic tissue noted to B/L LE. Left foot toenails 3-5 discolored yellow, but within limits on length and thickness. Left foot 2nd toenail is absent, with healthy granular nail bed present. Webspaces left foot 2-4 discolored with betadine orange, macerated, however intact.     Patient provided verbal consent for today's photos. LLE:  Full thickness ulceration noted to plantar lateral aspect left foot beginning distally at base of 5th digit and extending proximally plantar and lateral to the level of the mid aspect of the 5th metatarsal. Hyperkeratotic tissue noted plantar aspect first metatarsal head. Measures 4.5 cm x 4 cm x 0.1 cm. Base is a mixture of granular and fibrotic tissue with hyperkeratotic and xerotic rim. Scant sero sanguinous drainage noted. No active purulent drainage noted. erythema noted periwound. No fluctuance, crepitus, or malodor noted. Wound does not probe to bone, tunnel, or track. RLE:  Full-thickness ulceration noted to the plantar aspect of the right foot near cuboid region with granular, fibrotic base with macerated periwound, measures 4.3cm x 3.2cm x 0.2cm. No erythema noted. Serous drainage noted. No purulent drainage noted. Wound does not probe to bone, tunnel, or track. No fluctuance or crepitus noted.  No malodor noted from wound.      MUSCULOSKELETAL: Muscle strength is 4/5 for all pedal groups tested. No pain with palpation of b/l LE. Ankle joint ROM is decreased in dorsiflexion with the knee extended. 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 IMAGING:    FINDINGS:       OSSEOUS STRUCTURES: Postsurgical changes of first toe amputation to the level of the proximal phalanx. Healing fracture deformity of the second proximal phalanx. No acute fractures. No osseous erosions.       JOINT SPACES: Unchanged forefoot, midfoot, and hindfoot osteoarthrosis.       SOFT TISSUES: There is a soft tissue defect in the distal first toe amputation stump, along the lateral aspect of the forefoot. There is diffuse soft tissue swelling of the foot.       OTHER FINDINGS: Plantar calcaneal enthesophyte.           Impression       1. Soft tissue defect at the first toe amputation stump and lateral forefoot without radiographic evidence of osteomyelitis. 2. Diffuse foot soft tissue swelling. 3. Healing fracture deformity of the second proximal phalanx. ASSESSMENT  -Full-thickness ulceration; Alondra Stains 2; right lower extremity  -Full thickness ulceration, left foot, Carpenter 2  -Cellulitis, left foot-resolved  -Onychomycosis x 3, Left  -Macerated webspaces x 3: Left foot  -Diabetes Mellitus Type II uncontrolled with peripheral neuropathy  -History of non-compliance with weightbearing status and dressing changes    PLAN  -Evaluation and management x 15 minutes and greater than 50% of the time spent explaining the etiology and treatment with the patient.   -Using a #15 blade, I excisionally debrided the hyperkeratotic right foot wound down to and including muscle. Less than 10 cc of bleeding noted. Hemostasis was achieved with direct pressure. Patient tolerated well. -Using a #15 blade, excisional debridement of the left foot wound down to and including subcutaneous tissues. Less than 10 cc of bleeding noted. Hemostasis was achieved with direct pressure. Patient tolerated well.

## 2021-08-06 NOTE — TELEPHONE ENCOUNTER
Please call and let the patient know that this medication is prescribed to her by her Nephrologist, Dr. Mitzy Cadena. Please have the patient direct the request to Dr. Mitzy Cadena.

## 2021-08-11 NOTE — TELEPHONE ENCOUNTER
PT STATED SHE TESTED POSSITIVE FOR FENTANYL AT THE [facility] AND SHE WANTS TO BE RECHECK FOR THAT DRUG BY THE CLINIC, PT STATED SHE HAS NOT BEEN THAT DRUG, PLEASE CALL [phone]

## 2021-08-12 NOTE — PROGRESS NOTES
Outpatient Clinic Established Patient Note    Patient: Bonnie Garay  : 1965 (60 y.o.)  Date: 2021    CC: DM follow-up    HPI:      Patient is a 53-year-old female with past medical history of hypertension, CKD 4, T2DM, DVT, diabetic ulcers and following with Podiatry who presented to the clinic for routine follow-up. She state she has been complaint with her DM regimen. AM blood sugars have been 100-140. She admits she has not been complaint with her recommended diet. She continues to take Methadone and follows with the [facility] for chronic pain. She was hospitalized in 21 for lower extremity cellulitis. She states she recently tested positive for Fentanyl on Tuesday and Wednesday this week. She states she has no idea how this showed up in her urine. She states she has an appointment tomorrow with Methadone clinic to discuss her most recent positive test results. BP in the clinic today remains elevated however she is worked up from the stress for her recent positive UDS. She has a follow-up with Podiatry on 21 and Nephrology in 2021      Home Meds:  Prior to Visit Medications    Medication Sig Taking?  Authorizing Provider   doxazosin (CARDURA) 4 MG tablet Take 1 tablet by mouth 2 times daily Yes Mohamud Rossi MD   ELIQUIS 5 MG TABS tablet TAKE 1 TABLET BY MOUTH TWICE DAILY Yes Mohamud Rossi MD   clindamycin (CLEOCIN) 300 MG capsule Take 1 capsule by mouth 3 times daily for 10 days Yes Yvette Luis DPM   ciprofloxacin (CIPRO) 500 MG tablet Take 1 tablet by mouth 2 times daily for 10 days Yes Yvette Luis DPM   escitalopram (LEXAPRO) 10 MG tablet Take 1 tablet by mouth daily Yes Laxmi Fraser MD   omeprazole (PRILOSEC) 20 MG delayed release capsule Take 1 capsule by mouth every morning Yes Mohamud Rossi MD   furosemide (LASIX) 20 MG tablet Take 1 tablet by mouth 2 times daily Yes Mohamud Rossi MD   methadone (DOLOPHINE) 10 MG/ML solution Take 140 mg by mouth daily. Ver[facility] dose with [facility] ([phone]) 6/18/21 Yes Historical Provider, MD   amitriptyline (ELAVIL) 100 MG tablet TAKE 1 TABLET BY MOUTH EVERY NIGHT AT BEDTIME Yes Rene Tsang MD   metoprolol succinate (TOPROL XL) 100 MG extended release tablet Take 1 tablet by mouth daily Yes Javy Coronel MD   atorvastatin (LIPITOR) 20 MG tablet Take 1 tablet by mouth nightly Yes Hari West MD   gabapentin (NEURONTIN) 400 MG capsule Take 1 capsule by mouth 2 times daily for 90 days. Yes Hari West MD   ammonium lactate (LAC-HYDRIN) 12 % lotion Apply topically daily. Patient taking differently: Apply topically as needed Apply topically daily. Yes Damaris Donohue MD   insulin glargine (BASAGLAR KWIKPEN) 100 UNIT/ML injection pen Inject 50 Units into the skin daily Yes Nicola Schmidt MD   nystatin (MYCOSTATIN) 141224 UNIT/GM powder Apply topically 2 times daily Apply to right breast and groin area BID Yes Florentin Solano MD   aspirin EC 81 MG EC tablet Take 1 tablet by mouth daily Yes Florentin Solano MD   blood glucose test strips (TRUE METRIX BLOOD GLUCOSE TEST) strip 1 each by In Vitro route 2 times daily As needed.  Yes Florentin Solano MD   Insulin Pen Needle 31G X 5 MM MISC 1 each by Does not apply route daily Yes Florentin Solano MD   Lancets MISC 1 each by Does not apply route 2 times daily PHARMACY MAY SUBSTITUTE TO TRUE METRIX LANCETS Yes Florentin Solano MD   albuterol sulfate  (90 Base) MCG/ACT inhaler Inhale 2 puffs into the lungs every 6 hours as needed for Wheezing  Patient taking differently: Inhale 2 puffs into the lungs every 6 hours as needed for Wheezing  Yes Chloe Radford MD   Gauze Pads & Dressings MISC Please dispense 4x8 guaze, kerlix, and ace Yes Jeremias Mulligan DPM   insulin aspart (NOVOLOG FLEXPEN) 100 UNIT/ML injection pen Inject 8 Units into the skin 3 times daily (before meals)  Patient not taking: Reported on 8/12/2021  Isis Pizano MD Allergies:    Sulfa antibiotics    Health Maintenance Due   Topic Date Due    Hepatitis B vaccine (1 of 3 - Risk 3-dose series) Never done    Shingles Vaccine (1 of 2) Never done    Diabetic retinal exam  08/28/2016    Pneumococcal 0-64 years Vaccine (2 of 4 - PCV13) 11/13/2016    Low dose CT lung screening  02/12/2017    Breast cancer screen  09/10/2017    Cervical cancer screen  04/15/2019    Lipid screen  10/07/2020       Immunization History   Administered Date(s) Administered    Influenza 11/08/2011    Influenza Virus Vaccine 09/12/2014, 10/16/2015, 10/27/2017    Influenza, Quadv, 6 mo and older, IM, PF (Flulaval, Fluarix) 01/05/2019    Influenza, Quadv, IM, PF (6 mo and older Fluzone, Flulaval, Fluarix, and 3 yrs and older Afluria) 11/10/2016, 10/03/2019    Pneumococcal Polysaccharide ([id number]) 11/13/2015    Tdap (Boostrix, Adacel) 07/22/2014       Review of Systems   Constitutional: Negative for activity change, appetite change, chills and fever. Patient is wheelchair-bound   Respiratory: Negative for cough, choking, chest tightness and shortness of breath. Cardiovascular: Negative for chest pain, palpitations and leg swelling. Gastrointestinal: Negative for blood in stool, constipation and diarrhea. Endocrine: Negative for cold intolerance, heat intolerance, polydipsia and polyphagia. Genitourinary: Negative for dysuria. Psychiatric/Behavioral: Negative for behavioral problems and hallucinations. Data: Old records have been reviewed electronically. PHYSICAL EXAM:  [facility] 10/23/2015   Physical Exam  Constitutional:       Comments: Patient is wheelchair-bound. HENT:      Head: Normocephalic and atraumatic. Eyes:      Extraocular Movements: Extraocular movements intact. Pupils: Pupils are equal, round, and reactive to light. Cardiovascular:      Rate and Rhythm: Regular rhythm. Pulses: Normal pulses. Heart sounds: Normal heart sounds.  No murmur heard. No friction rub. No gallop. Pulmonary:      Effort: Pulmonary effort is normal. No respiratory distress. Breath sounds: Normal breath sounds. No wheezing or rales. Abdominal:      General: Bowel sounds are normal. There is no distension. Palpations: Abdomen is soft. Tenderness: There is no abdominal tenderness. There is no guarding. Neurological:      Mental Status: She is alert and oriented to person, place, and time. Psychiatric:         Behavior: Behavior normal.         Assessment & Plan:      1. Hypertension  - remains elevated in the clinic today at 179/69. She is worked up and stressed from his recent positive UDS  - Recently had her Cardura increased to 4 mg to BID, Metoprolol 100 mg QD  - Instructed to continue to record her BP at home and bring her BP log at her next appointment   - Keep follow-up with Nephrology in 9/2021    2. Type 2 diabetes mellitus with foot ulcer, with long-term current use of insulin ([address]  Patient endorses her glucose has been under better control. Her blood glucose ranges in the 100-140s.  -HgbA1c pending  - continue with current DM regimen       3. Stage 4 chronic kidney disease  Follows up with nephrology. Next appointment 9/2021     4. Hx DVT  - has had 2 DVTs in past  - continues to take eliquis, no bleeding     5. Chronic pain syndrome  - follows with Methadone clinic  - recently positive UDS for Fentany x2.   - repeat UDS order given today. Instructed to her that it would need to be supervised to be given any credit by Methadone clinic given she had the two recent positive tests    6.  Bilateral Diabetic foot ulcers  -continue to follow with Podiatry  - Cipro and Clindamycin  - follow-up on 8/16/21    Dispo: Pt has been staffed with Dr. Asa Keenan  _______________  Nataliya Hutson MD, 8/12/2021 1:29 PM   PGY-2

## 2021-08-16 NOTE — PROGRESS NOTES
Department of Podiatry  Resident Progress Note    Chris Payer  Allergies: Sulfa antibiotics    SUBJECTIVE  The patient is a 60 y.o. female who presents with wounds to bilateral lower extremity. Patient reports changing dressings daily with Betadine to both wound and wrapping them up with gauze roll and ACE bandages. Patient states that her wounds could be rubbing up against her cam boots but she is unsure. Patient reports compliance with her weightbearing status. Patient reports slight improvement to wounds on both feet. Patient reports she has been taking her antibiotic, clindamycin, and still has a few pills left. Patient denies any other pedal complaints. Patient denies nausea, vomiting,chills, fever, shortness of breath. Past Medical History:        Diagnosis Date    Amenorrhea     Asthma 05/14/2004    Bacterial vaginosis 04/2008    Carpal tunnel syndrome 05/2007    COPD (chronic obstructive pulmonary disease) ([address]     Diabetes mellitus type II 08/2007    10/1/20 pt states does accucheck 2x/day at home    Diabetic neuropathy ([address] 79/5448    Diastolic CHF ([address]     DVT [facility]deep venous thrombosis) [facility] 03/2004    Dyslipidemia 05/2009    Dyspareunia 05/2009    ETOH abuse 03/04/2007    Feet clawing     HTN (hypertension)     Hx of blood clots     Hyperlipidemia     MRSA (methicillin resistant staph aureus) culture positive 11/06/2017; 11/17/2017    foot; leg     Neuropathy 05/2009    polyneuropathy    Pain, back 04/2008    Pancreatitis 05/14/2004    Pseudocyst, pancreas 05/14/2004    Scalp lesion 08/2007    Tinea pedis     Tobacco abuse 03/2008    Uses walker     Uses wheelchair     also uses walker    Wears dentures        REVIEW OF SYSTEMS:  As above    OBJECTIVE  Patient presents to clinic with assistance of a wheelchair, accompanied by family member, wearing CAM boot to bilateral lower extremity.  She is alert and oriented to person, place, and time and appears to be in no acute distress.     VASCULAR: DP and PT pulses are palpable 1/4 b/l. CFT is brisk to the right TMA distal stump site RLE and remaning digits on the left foot. Skin temperature warm to cool from proximal to distal with no focal calor noted RLE. Mild focal calor noted abraham-wound LLE. +2 pitting edema noted to b/l LE.     NEUROLOGIC: Protective sensation is diminished at all pedal sites b/l. Gross and epicritic sensation is diminished b/l.      DERMATOLOGIC: Dermatological changes noted B/L LE. Diffuse flaky/xerotic tissue noted to B/L LE. Left foot toenails 3-5 discolored yellow, but within normal limits on length and thickness. Left foot 2nd toenail is absent, with healthy granular nail bed present. Webspaces left foot 2-4 discolored with betadine orange, macerated, however intact.     Patient provided verbal consent for today's photos. LLE:  Full thickness ulceration noted to plantar lateral aspect left foot beginning distally at base of 5th digit and extending proximally plantar and lateral to the level of the mid aspect of the 5th metatarsal. Measures 4.8 cm x 4.2 cm x 0.1 cm. Base is a mixture of granular and fibrotic tissue with hyperkeratotic and xerotic rim. Scant sanguinous drainage noted. No active purulent drainage noted. erythema noted periwound. No fluctuance, crepitus, or malodor noted. Wound does not probe to bone, tunnel, or track. Hyperkeratotic tissue noted plantar aspect first metatarsal head. RLE:  Full-thickness ulceration noted to the plantar aspect of the right foot near cuboid region with granular/fibrotic base with macerated periwound, measures 4.2 cm x 3.4 cm x 0.2 cm. No erythema noted. Scant sanguinous drainage noted. No purulent drainage noted. Wound does not probe to bone, tunnel, or track. No fluctuance or crepitus noted. No malodor noted from wound.      MUSCULOSKELETAL: Muscle strength is 4/5 for all pedal groups tested. No pain with palpation of b/l LE.  Ankle joint ROM is decreased in dorsiflexion with the knee extended. 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 IMAGING:    FINDINGS:       OSSEOUS STRUCTURES: Postsurgical changes of first toe amputation to the level of the proximal phalanx. Healing fracture deformity of the second proximal phalanx. No acute fractures. No osseous erosions.       JOINT SPACES: Unchanged forefoot, midfoot, and hindfoot osteoarthrosis.       SOFT TISSUES: There is a soft tissue defect in the distal first toe amputation stump, along the lateral aspect of the forefoot. There is diffuse soft tissue swelling of the foot.       OTHER FINDINGS: Plantar calcaneal enthesophyte.           Impression       1. Soft tissue defect at the first toe amputation stump and lateral forefoot without radiographic evidence of osteomyelitis. 2. Diffuse foot soft tissue swelling. 3. Healing fracture deformity of the second proximal phalanx. ASSESSMENT  -Full-thickness ulceration; [place] 2; right lower extremity  -Full thickness ulceration, left foot, Carpenter 2  -Cellulitis, left foot-resolved  -Onychomycosis x 3, Left  -Macerated webspaces x 3: Left foot  -Diabetes Mellitus Type II uncontrolled with peripheral neuropathy  -History of non-compliance with weightbearing status and dressing changes    PLAN  -Evaluation and management x 15 minutes and greater than 50% of the time spent explaining the etiology and treatment with the patient.   -Patient provided verbal consent for debridement. Using a #15 blade, I excisionally debrided the hyperkeratotic right foot wound down to and including muscle to healthy bleeding tissue margins. Less than 3 cc of bleeding noted. Hemostasis was achieved with direct pressure. Patient tolerated well.   -Patient provided verbal consent for debridement. Using a #15 blade, excisional debridement of the left foot wound down to and including subcutaneous tissues to healthy bleeding tissue margins.   Less than 5 cc of bleeding noted. Hemostasis was achieved with direct pressure. Patient tolerated well. -XR imaging reviewed, impression noted above  -Right lower extremity dressing applied consisting of betadine, gauze, Kerlix, Ace bandage  -Left lower extremity dressing: betadine to webspaces, betadine, gauze, kerlix, Ace bandage  -Instructed patient to continue to perform dressing changes daily b/l LE consisting of the dressings described above. Patient understood.  -Ordered venous duplex doppler of right LE, will follow up results.   -Ordered MRI right foot  -Instructed patient to watch for signs/symptoms of infection including but not limited to fever, chills, nausea, vomiting, redness to wound and streaking up leg, purulent drainage; instructed patient to call office or present to ED if these occur.  -Patient instructed to continue clindamycin through entire duration of original prescription.  -Patient instructed to be continued strict nonweightbearing to b/l lower extremity in bilateral CAM boot. Patient understood.  -Prescription for wheelchair provided to patient.   Patient will require wheelchair to remain nonweightbearing to bilateral lower extremity.  -Return to clinic in 1 week     Discussed assessment and plan with RAMONE Kohli DPM  08/16/21  2:45 PM

## 2021-08-23 NOTE — PROGRESS NOTES
Department of Podiatry  Resident Progress Note    Lazarus Marine  Allergies: Sulfa antibiotics    SUBJECTIVE  The patient is a 60 y.o. female who presents with wounds to bilateral lower extremity. Patient relates that she has been doing every other day dressing changes consisting of Betadine to the wounds. Patient states that due to new medication that she is up and mobile and unable to be nonweightbearing secondary to feeling that she will follow. Patient states no changes from wounds from last week's evaluation. She also admits that she finished her clindamycin antibiotics. Patient denies nausea, vomiting, fever, chills, shortness of breath, or other constitutional symptoms. Patient denies any other pedal complaints during today's evaluation. Past Medical History:        Diagnosis Date    Amenorrhea     Asthma 05/14/2004    Bacterial vaginosis 04/2008    Carpal tunnel syndrome 05/2007    COPD (chronic obstructive pulmonary disease) ([address]     Diabetes mellitus type II 08/2007    10/1/20 pt states does accucheck 2x/day at home    Diabetic neuropathy ([address] 36/8820    Diastolic CHF ([address]     DVT (deep venous thrombosis) ([address] 03/2004    Dyslipidemia 05/2009    Dyspareunia 05/2009    ETOH abuse 03/04/2007    Feet clawing     HTN (hypertension)     Hx of blood clots     Hyperlipidemia     MRSA (methicillin resistant staph aureus) culture positive 11/06/2017; 11/17/2017    foot; leg     Neuropathy 05/2009    polyneuropathy    Pain, back 04/2008    Pancreatitis 05/14/2004    Pseudocyst, pancreas 05/14/2004    Scalp lesion 08/2007    Tinea pedis     Tobacco abuse 03/2008    Uses walker     Uses wheelchair     also uses walker    Wears dentures        REVIEW OF SYSTEMS:  As above    OBJECTIVE  Patient presents to clinic with assistance of a wheelchair, accompanied by her daughter, wearing CAM boot to bilateral lower extremity.  She is alert and oriented to person, place, and time and MUSCULOSKELETAL:   Muscle strength is 4/5 for all pedal groups tested. No pain with palpation of b/l LE. Ankle joint ROM is decreased in dorsiflexion with the knee extended. 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 IMAGING:    FINDINGS:       OSSEOUS STRUCTURES: Postsurgical changes of first toe amputation to the level of the proximal phalanx. Healing fracture deformity of the second proximal phalanx. No acute fractures. No osseous erosions.       JOINT SPACES: Unchanged forefoot, midfoot, and hindfoot osteoarthrosis.       SOFT TISSUES: There is a soft tissue defect in the distal first toe amputation stump, along the lateral aspect of the forefoot. There is diffuse soft tissue swelling of the foot.       OTHER FINDINGS: Plantar calcaneal enthesophyte.           Impression       1. Soft tissue defect at the first toe amputation stump and lateral forefoot without radiographic evidence of osteomyelitis. 2. Diffuse foot soft tissue swelling. 3. Healing fracture deformity of the second proximal phalanx. ASSESSMENT  -Full-thickness ulceration; [place] 2; right lower extremity  -Full thickness ulceration, left foot, Carpenter 2  -Cellulitis, left foot-resolved  -PVD  -Onychomycosis x 3, Left  -Macerated webspaces x 3: Left foot  -Diabetes Mellitus Type II uncontrolled with peripheral neuropathy  -History of non-compliance with weightbearing status and dressing changes    PLAN  -Evaluation and management x 30 minutes and greater than 50% of the time spent explaining the etiology and treatment with the patient.    -XR imaging reviewed, impression noted above  -Right lower extremity dressing applied consisting of saline gauze, Kerlix, Ace bandage.  -Left lower extremity dressing applied consisting of saline gauze, Kerlix, Ace bandage.  -Will talk with [address] [place] [address] that ordering Santyl for the patient at next visit.  -Instructed patient to continue to perform every other day dressing changes daily b/l LE consisting of the dressings described above. Patient understood.  -Ordered arterial duplex, follow-up results.  -Instructed patient to follow-up on all imaging. Patient understood and will get the venous duplex and MRI ordered as soon as possible.   -Instructed patient to continue to watch for signs/symptoms of infection including but not limited to fever, chills, nausea, vomiting, redness to wound and streaking up leg, purulent drainage; instructed patient to call office or present to ED if these occur.  -Patient instructed to be continued strict nonweightbearing to b/l lower extremity in bilateral CAM boot. Patient understood.  -Return to clinic in 1 week     Discussed assessment and plan with Dr. Kaylie Tran, [address] [facility] resident, PGY 3  [place], [place]  08/23/21  8:59 AM

## 2021-08-23 NOTE — TELEPHONE ENCOUNTER
JOSE MANUEL FROM [facility], [address] [phone] STATED THEY NO LONGER DISPENSE SANTYL MEDICATION AND THE MEDICATION CAN BE SENT TO PT'S LOCAL PHARM

## 2021-08-30 NOTE — PATIENT INSTRUCTIONS
Do santyl dressing changes if the Santyl you have is not . If it is  , do Betadine dressings daily. Return in 2 weeks.    Antibiotics ordered One tablet Doxycycline twice a day

## 2021-08-30 NOTE — PROGRESS NOTES
Department of Podiatry  Resident Progress Note    Franco Chavez  Allergies: Sulfa antibiotics    SUBJECTIVE  The patient is a 60 y.o. female who presents with wounds to bilateral lower extremity. Patient states dressings have been changed using Santyl ointment they were previously prescribed. Patient also reports concern of worsening redness, irritation to her left fifth toe. She states she noticed the redness increased over the last few days. She denies noticing any malodor, purulent drainage to the left foot wound. She denies nausea, vomiting, fever, chills, shortness breath, chest pain. No other pedal complaints this time. Past Medical History:        Diagnosis Date    Amenorrhea     Asthma 05/14/2004    Bacterial vaginosis 04/2008    Carpal tunnel syndrome 05/2007    COPD (chronic obstructive pulmonary disease) ([address]     Diabetes mellitus type II 08/2007    10/1/20 pt states does accucheck 2x/day at home    Diabetic neuropathy ([address] 99/3120    Diastolic CHF ([address]     DVT (deep venous thrombosis) ([address] 03/2004    Dyslipidemia 05/2009    Dyspareunia 05/2009    ETOH abuse 03/04/2007    Feet clawing     HTN (hypertension)     Hx of blood clots     Hyperlipidemia     MRSA (methicillin resistant staph aureus) culture positive 11/06/2017; 11/17/2017    foot; leg     Neuropathy 05/2009    polyneuropathy    Pain, back 04/2008    Pancreatitis 05/14/2004    Pseudocyst, pancreas 05/14/2004    Scalp lesion 08/2007    Tinea pedis     Tobacco abuse 03/2008    Uses walker     Uses wheelchair     also uses walker    Wears dentures        REVIEW OF SYSTEMS:  As above    OBJECTIVE  Patient presents to clinic with assistance of a wheelchair, accompanied by her daughter, wearing CAM boot to bilateral lower extremity. She is alert and oriented to person, place, and time and appears to be in no acute distress.     VASCULAR: DP and PT pulses are nonpalpable b/l.  CFT less than 3 seconds to the right TMA distal stump site RLE and remaning digits on the left foot. Skin temperature warm to cool from proximal to distal with no focal calor noted RLE. Nonpitting edema noted with relaxed skin tension lines to b/l LE.       NEUROLOGIC:   Protective sensation is diminished at all pedal sites b/l. Gross and epicritic sensation is diminished b/l.      DERMATOLOGIC:   Dermatological changes noted B/L LE. Diffuse flaky/xerotic tissue noted to B/L LE. Left foot toenails 3-5 discolored yellow, but within normal limits on length and thickness. Left foot 2nd toenail is absent, with healthy granular nail bed present. Webspaces left foot 2-4 discolored with betadine orange, macerated, however intact.     Patient provided verbal consent for today's photos. LLE:  Increased erythema noted to the left 5th digit, and dorsal aspect of the wound. Erythema extends to the level of the 5th metatarsal shaft. Full thickness ulceration noted to plantar lateral aspect left foot beginning at the base of 5th digit and extending proximally plantar and lateral to the level of the mid aspect of the 5th metatarsal. Measures approximately 4.8 cm x 4.2 cm x 0.1 cm. Base is a mixture of granular and fibrotic slough tissue with slight hyperkeratotic rim. Wound does not probe to bone, tunnel, or track. No fluctuance, crepitus, drainage, or malodor noted. Wounds appear clinically stable and no acute signs of infection noted. RLE:  Full-thickness ulceration noted to the plantar aspect of the right foot near cuboid region. Wound measures approximately 4.2 cm x 3.4 cm x 0.2 cm. Wound base mixture of mostly granular with slightly fibrotic base with macerated periwound. Wound does not probe to bone, tunnel, or track. No fluctuance or crepitus noted. No malodor noted from wound.              MUSCULOSKELETAL:   Muscle strength is 4/5 for all pedal groups tested. No pain with palpation of b/l LE.  Ankle joint ROM is decreased in dorsiflexion with the knee extended. 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 IMAGING:  XR Left Foot (7/26/21)  FINDINGS:       OSSEOUS STRUCTURES: Postsurgical changes of first toe amputation to the level of the proximal phalanx. Healing fracture deformity of the second proximal phalanx. No acute fractures. No osseous erosions.       JOINT SPACES: Unchanged forefoot, midfoot, and hindfoot osteoarthrosis.       SOFT TISSUES: There is a soft tissue defect in the distal first toe amputation stump, along the lateral aspect of the forefoot. There is diffuse soft tissue swelling of the foot.       OTHER FINDINGS: Plantar calcaneal enthesophyte.       Impression   1. Soft tissue defect at the first toe amputation stump and lateral forefoot without radiographic evidence of osteomyelitis. 2. Diffuse foot soft tissue swelling. 3. Healing fracture deformity of the second proximal phalanx. ASSESSMENT  -Full-thickness ulceration; Sofia Pri[place] 2; right lower extremity  -Full thickness ulceration, left foot, Carpenter 2  -Cellulitis, left foot  -PVD  -Onychomycosis x 3, Left  -Macerated webspaces x 3: Left foot  -Diabetes Mellitus Type II uncontrolled with peripheral neuropathy  -History of non-compliance with weightbearing status and dressing changes    PLAN  -Evaluation and management x 30 minutes and greater than 50% of the time spent explaining the etiology and treatment with the patient. -XR imaging reviewed, impression noted above  -Using #15 blade, excisional debride the necrotic and nonviable tissue down to including the level of subcutaneous tissue. Less than 5 cc of bleeding noted. Hemostasis achieved with direct pressure. Patient tolerated well.   -Right lower extremity dressing applied consisting of saline gauze, Kerlix, Ace bandage.  -Left lower extremity dressing applied consisting of saline gauze, Kerlix, Ace bandage.  -Instructed patient to continue to perform everyday dressing changes daily b/l LE consisting of the dressings described above.  Patient understood.  -Arterial duplex pending  -Prescription for Doxycycline dispensed to patient  -Instructed patient to continue to watch for signs/symptoms of infection including but not limited to fever, chills, nausea, vomiting, redness to wound and streaking up leg, purulent drainage; instructed patient to call office or present to ED if these occur.  -Patient instructed to be continued strict nonweightbearing to b/l lower extremity in bilateral CAM boot. Patient understood.  -Return to clinic in 2 weeks     Discussed assessment and plan with RAMONE Fajardo DPM  08/30/21  6:34 PM

## 2021-09-20 NOTE — PATIENT INSTRUCTIONS
Antibiotics given to patient  And reviewed. Continue same dressing changes.     Remain non weight bearing

## 2021-09-20 NOTE — PROGRESS NOTES
Department of Podiatry  Resident Progress Note    Owen Aguilar  Allergies: Sulfa antibiotics    SUBJECTIVE  The patient is a 60 y.o. female who presents with wounds to bilateral lower extremities. Patient states dressings have been changed and has been using betadine ointment to the lower extremity wounds. Patient also reports concern of worsening redness to left lower extremity with swelling. During her previous visit she states that she was given doxycycline and that she finished the course of antibiotics and has not really helped with the irritation and redness to the left lower extremity . She states she noticed the redness to the calf over the last couple of days. She denies noticing any malodor, purulent drainage to the left foot wound. She denies nausea, vomiting, fever, chills, shortness breath, chest pain. No other pedal complaints this time.       Past Medical History:        Diagnosis Date    Amenorrhea     Asthma 05/14/2004    Bacterial vaginosis 04/2008    Carpal tunnel syndrome 05/2007    COPD (chronic obstructive pulmonary disease) ([address]     Diabetes mellitus type II 08/2007    10/1/20 pt states does accucheck 2x/day at home    Diabetic neuropathy ([address] 98/0007    Diastolic CHF ([address]     DVT (deep venous thrombosis) ([address] 03/2004    Dyslipidemia 05/2009    Dyspareunia 05/2009    ETOH abuse 03/04/2007    Feet clawing     HTN (hypertension)     Hx of blood clots     Hyperlipidemia     MRSA (methicillin resistant staph aureus) culture positive 11/06/2017; 11/17/2017    foot; leg     Neuropathy 05/2009    polyneuropathy    Pain, back 04/2008    Pancreatitis 05/14/2004    Pseudocyst, pancreas 05/14/2004    Scalp lesion 08/2007    Tinea pedis     Tobacco abuse 03/2008    Uses walker     Uses wheelchair     also uses walker    Wears dentures        REVIEW OF SYSTEMS:  As above    OBJECTIVE  Patient presents to clinic with assistance of a wheelchair, accompanied by her daughter, wearing CAM boot to bilateral lower extremity. She is alert and oriented to person, place, and time and appears to be in no acute distress.     VASCULAR: DP and PT pulses are nonpalpable b/l. CFT less than 3 seconds to the right TMA distal stump site RLE and remaning digits on the left foot. Skin temperature warm to cool from proximal to distal with no focal calor noted RLE. Nonpitting edema noted with relaxed skin tension lines to b/l LE.       NEUROLOGIC:   Protective sensation is diminished at all pedal sites b/l. Gross and epicritic sensation is diminished b/l.      DERMATOLOGIC:   Dermatological changes noted B/L LE. Diffuse flaky/xerotic tissue noted to B/L LE. Webspaces left foot 2-4 discolored with betadine orange, macerated, however intact.     Patient provided verbal consent for today's photos. LLE:  Increased erythema noted to the left 5th digit, and dorsal aspect of the wound. Erythema extends to the level of the distal calf. Full thickness ulceration noted to plantar lateral aspect left foot beginning at the base of 5th digit and extending proximally plantar and lateral to the level of the mid aspect of the 5th metatarsal. Measures approximately 3.9 cm x 3.5 cm x 0.1 cm. Base is a mixture of granular and fibrotic slough tissue with slight hyperkeratotic rim. Wound does not probe to bone, tunnel, or track. No fluctuance, crepitus, drainage, or malodor noted. RLE:  Full-thickness ulceration noted to the plantar aspect of the right foot near cuboid region. Wound measures approximately 4.1cm x 3.2cm x 0.2 cm. Wound base mixture of mostly granular with slightly fibrotic base with macerated and hyperkeratotic periwound. Wound does not probe to bone, tunnel, or track. No fluctuance or crepitus noted. No malodor noted from wound.          MUSCULOSKELETAL:   Muscle strength is 4/5 for all pedal groups tested. No pain with palpation of b/l LE.  Ankle joint ROM is decreased in dorsiflexion with the knee extended. 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 IMAGING:  XR Left Foot (7/26/21)  FINDINGS:       OSSEOUS STRUCTURES: Postsurgical changes of first toe amputation to the level of the proximal phalanx. Healing fracture deformity of the second proximal phalanx. No acute fractures. No osseous erosions.       JOINT SPACES: Unchanged forefoot, midfoot, and hindfoot osteoarthrosis.       SOFT TISSUES: There is a soft tissue defect in the distal first toe amputation stump, along the lateral aspect of the forefoot. There is diffuse soft tissue swelling of the foot.       OTHER FINDINGS: Plantar calcaneal enthesophyte.       Impression   1. Soft tissue defect at the first toe amputation stump and lateral forefoot without radiographic evidence of osteomyelitis. 2. Diffuse foot soft tissue swelling. 3. Healing fracture deformity of the second proximal phalanx. ASSESSMENT  -Full-thickness ulceration; [place] Going 2; right lower extremity  -Full thickness ulceration, left foot, Carpenter 2  -Cellulitis, left foot  -PVD  -Onychomycosis x 3, Left  -Macerated webspaces x 3: Left foot  -Diabetes Mellitus Type II uncontrolled with peripheral neuropathy  -History of non-compliance with weightbearing status and dressing changes    PLAN  -Evaluation and management x 30 minutes and greater than 50% of the time spent explaining the etiology and treatment with the patient. -XR imaging reviewed, impression noted above  -Using #10 blade, I excisionally debrided the necrotic and nonviable tissue down to including the level of subcutaneous tissue. Less than 15 cc of bleeding noted. Hemostasis achieved with direct pressure. Patient tolerated well.   -Right lower extremity dressing applied consisting of saline gauze, Kerlix, Ace bandage.  -Left lower extremity dressing applied consisting of saline gauze, Kerlix, Ace bandage.  -Instructed patient to continue to perform everyday dressing changes daily b/l LE consisting of the dressings described above.  Patient understood.  -Arterial duplex pending  -Prescription for Clindamycin and Ciprofloxacin dispensed to patient  -Instructed patient to continue to watch for signs/symptoms of infection including but not limited to fever, chills, nausea, vomiting, redness to wound and streaking up leg, purulent drainage; instructed patient to call office or present to ED if these occur.  -Patient instructed to be continued strict nonweightbearing to b/l lower extremity in bilateral CAM boot. Patient understood.  -Return to clinic in 1 week.     Discussed assessment and plan with Dr. Hill Frank[place], RAMONE Roberts DPM  09/20/21  12:24 PM

## 2021-09-27 NOTE — PROGRESS NOTES
Department of Podiatry  Resident Progress Note    Young Odilia  Allergies: Sulfa antibiotics    SUBJECTIVE  The patient is a 60 y.o. female who presents with wounds to bilateral lower extremities. Patient states dressings have been changed and has been using betadine ointment to the lower extremity wounds. During her previous visit she was give clindamycin and Cipro. Patient states that she still has a few days left. She states the new Cam boots that were given to her last week are causing her feet to blister. She denies noticing any malodor, purulent drainage to the left foot wound. She denies nausea, vomiting, fever, chills, shortness breath, chest pain. No other pedal complaints this time. Past Medical History:        Diagnosis Date    Amenorrhea     Asthma 05/14/2004    Bacterial vaginosis 04/2008    Carpal tunnel syndrome 05/2007    COPD (chronic obstructive pulmonary disease) ([address]     Diabetes mellitus type II 08/2007    10/1/20 pt states does accucheck 2x/day at home    Diabetic neuropathy ([address] 81/2843    Diastolic CHF ([address]     DVT (deep venous thrombosis) ([address] 03/2004    Dyslipidemia 05/2009    Dyspareunia 05/2009    ETOH abuse 03/04/2007    Feet clawing     HTN (hypertension)     Hx of blood clots     Hyperlipidemia     MRSA (methicillin resistant staph aureus) culture positive 11/06/2017; 11/17/2017    foot; leg     Neuropathy 05/2009    polyneuropathy    Pain, back 04/2008    Pancreatitis 05/14/2004    Pseudocyst, pancreas 05/14/2004    Scalp lesion 08/2007    Tinea pedis     Tobacco abuse 03/2008    Uses walker     Uses wheelchair     also uses walker    Wears dentures        REVIEW OF SYSTEMS:  As above    OBJECTIVE  Patient presents to clinic with assistance of a wheelchair, accompanied by her daughter, wearing CAM boot to bilateral lower extremity.  She is alert and oriented to person, place, and time and appears to be in no acute distress.     VASCULAR: DP and PT pulses are nonpalpable b/l. CFT less than 3 seconds to the right TMA distal stump site RLE and remaning digits on the left foot. Skin temperature warm to cool from proximal to distal with no focal calor noted RLE. Nonpitting edema noted with relaxed skin tension lines to b/l LE.       NEUROLOGIC:   Protective sensation is diminished at all pedal sites b/l. Gross and epicritic sensation is diminished b/l.      DERMATOLOGIC:   Dermatological changes noted B/L LE. Diffuse flaky/xerotic tissue noted to B/L LE. Webspaces left foot 2-4 discolored with betadine orange, macerated, however intact.     Patient provided verbal consent for today's photos. LLE:  Increased erythema noted to the left 5th digit, and dorsal aspect of the wound. Erythema extends to the level of the distal calf. Full thickness ulceration noted to plantar lateral aspect left foot beginning at the base of 5th digit and extending proximally plantar and lateral to the level of the mid aspect of the 3rd, 4th, and 5th metatarsal . Measures approximately 7.0cm x 4.7 cm x 0.2 cm. Base is a mixture of granular and fibrotic slough tissue with slight hyperkeratotic rim. Wound does not probe to bone, tunnel, or track. No fluctuance, crepitus, drainage, or malodor noted. RLE:  Full-thickness ulceration noted to the plantar aspect of the right foot near cuboid region. Wound measures approximately 5.1 cm x 3.4cm x 0.4 cm. Wound base mixture of mostly granular with slightly fibrotic base with macerated and hyperkeratotic periwound. Wound does not probe to bone, tunnel, or track. No fluctuance or crepitus noted. No malodor noted from wound.            MUSCULOSKELETAL:   Muscle strength is 4/5 for all pedal groups tested. No pain with palpation of b/l LE. Ankle joint ROM is decreased in dorsiflexion with the knee extended. 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     IMAGING:  XR Left Foot (7/26/21)  FINDINGS:       OSSEOUS STRUCTURES: Postsurgical changes of first toe amputation to the level of the proximal phalanx. Healing fracture deformity of the second proximal phalanx. No acute fractures. No osseous erosions.       JOINT SPACES: Unchanged forefoot, midfoot, and hindfoot osteoarthrosis.       SOFT TISSUES: There is a soft tissue defect in the distal first toe amputation stump, along the lateral aspect of the forefoot. There is diffuse soft tissue swelling of the foot.       OTHER FINDINGS: Plantar calcaneal enthesophyte.       Impression   1. Soft tissue defect at the first toe amputation stump and lateral forefoot without radiographic evidence of osteomyelitis. 2. Diffuse foot soft tissue swelling. 3. Healing fracture deformity of the second proximal phalanx. ASSESSMENT  -Full-thickness ulceration; [facility] 2; right lower extremity  -Full thickness ulceration, left foot, Carpenter 2  -Cellulitis, left foot  -PVD  -Onychomycosis x 3, Left  -Macerated webspaces x 3: Left foot  -Diabetes Mellitus Type II uncontrolled with peripheral neuropathy  -History of non-compliance with weightbearing status and dressing changes    PLAN  -Evaluation and management x 30 minutes and greater than 50% of the time spent explaining the etiology and treatment with the patient. -XR imaging reviewed, impression noted above  -Using #10 blade, I excisionally debrided the necrotic and nonviable tissue down to including the level of subcutaneous tissue. Less than 15 cc of bleeding noted. Hemostasis achieved with direct pressure. Patient tolerated well. -Right lower extremity dressing applied consisting of betadine gauze, Kerlix, Ace bandage.  -Left lower extremity dressing applied consisting of betadine gauze, Kerlix, Ace bandage.  -Instructed patient to continue to perform everyday dressing changes daily b/l LE consisting of the dressings described above.  Patient understood.  -Arterial duplex pending  -Instructed patient to continue to watch for signs/symptoms of infection including but not limited to fever, chills, nausea, vomiting, redness to wound and streaking up leg, purulent drainage; instructed patient to call office or present to ED if these occur.  -Patient instructed to be continued strict nonweightbearing to b/l lower extremity in bilateral CAM boot. Patient understood.  -Return to clinic in 1 week.     Discussed assessment and plan with RAMONE Holland ZACHARY - [facility]  09/27/21  6:33 PM

## 2021-09-27 NOTE — PATIENT INSTRUCTIONS
Betadine to the wounds and gauze  Kerlix and ACE up the legs  Finish Antibiotics  Return to clinic in 1 week

## 2021-10-01 PROBLEM — I50.21 SYSTOLIC CHF, ACUTE (HCC): Status: ACTIVE | Noted: 2021-01-01

## 2021-10-01 NOTE — ED PROVIDER NOTES
[facility]          PHYSICIAN ASSISTANT NOTE       Date of evaluation: 10/1/2021    Chief Complaint     Leg Pain (x 2 weeks.)    History of Present Illness     Amie Rinaldi is a 60 y.o. female with a past medical history of diabetes with full-thickness ulcerations to the plantar aspect of both feet followed by podiatry currently on Clindamycin and Cipro, stage III chronic kidney disease, DVT anticoagulated on Eliquis presenting to the emergency department for evaluation of bilateral lower leg pain. Patient states that her symptoms have been present for the last 2 weeks. She is concerned that she may be retaining fluid in her legs, the last time this occurred was about 1 year ago when she had to be admitted for diuresis. No associated shortness of breath or abdominal fullness. She is currently on 40 mg of Lasix daily and has had good urine output. She is followed by nephrology, and has never required dialysis. She has chronic wounds on the plantar aspect of her feet, states that her daughter has been doing dressing changes, the most recent was today. No increase in purulence from the wounds. No fevers or chills, no redness or warmth of her feet or lower legs. Review of Systems     Review of Systems   Constitutional: Negative for chills, diaphoresis, fatigue and fever. Respiratory: Negative for chest tightness, shortness of breath and wheezing. Cardiovascular: Positive for leg swelling. Negative for chest pain and palpitations. Gastrointestinal: Negative for abdominal pain, nausea and vomiting. Genitourinary: Negative for dysuria and frequency. Musculoskeletal: Negative for arthralgias, back pain and myalgias. Skin: Negative for color change, pallor and rash. Neurological: Negative for dizziness, weakness and headaches. Psychiatric/Behavioral: Negative for confusion.        Past Medical, Surgical, Family, and Social History     She has a past medical history of Amenorrhea, Asthma, Bacterial vaginosis, Carpal tunnel syndrome, COPD (chronic obstructive pulmonary disease) ([facility] Diabetes mellitus type II, Diabetic neuropathy ([facility]), Diastolic CHF ([facility] DVT (deep venous thrombosis) ([facility] Dyslipidemia, Dyspareunia, ETOH abuse, Feet clawing, HTN (hypertension), Hx of blood clots, Hyperlipidemia, MRSA (methicillin resistant staph aureus) culture positive, Neuropathy, Pain, back, Pancreatitis, Pseudocyst, pancreas, Scalp lesion, Tinea pedis, Tobacco abuse, Uses walker, Uses wheelchair, and Wears dentures. She has a past surgical history that includes  section (unknown); pre-malignant / benign skin lesion excision (7003); other surgical history (Left, 2016); Toe amputation (Left, 2017); other surgical history (Right, 10/20/2017); other surgical history (Right, 2018); pr debridement, skin, sub-q tissue,muscle,bone,=<20 sq cm (Right, 2018); Foot Debridement (Right, 2019); Foot Debridement (Right, 2019); Foot Debridement (Right, 2019); Foot Debridement (Left, 10/23/2019); Tonsillectomy; knee surgery (Left); and Foot Debridement (Right, 3/29/2021). Her family history is not on file. She reports that she quit smoking about 3 years ago. Her smoking use included cigarettes. She has a 30.00 pack-year smoking history. She has never used smokeless tobacco. She reports that she does not drink alcohol and does not use drugs. Medications     Previous Medications    ALBUTEROL SULFATE  (90 BASE) MCG/ACT INHALER    Inhale 2 puffs into the lungs every 6 hours as needed for Wheezing    AMITRIPTYLINE (ELAVIL) 100 MG TABLET    TAKE 1 TABLET BY MOUTH EVERY NIGHT AT BEDTIME    AMMONIUM LACTATE (LAC-HYDRIN) 12 % LOTION    Apply topically daily.     ASPIRIN EC 81 MG EC TABLET    Take 1 tablet by mouth daily    ATORVASTATIN (LIPITOR) 20 MG TABLET    Take 1 tablet by mouth nightly    BLOOD GLUCOSE TEST STRIPS (TRUE METRIX BLOOD GLUCOSE TEST) STRIP    1 each by In Vitro route 2 times daily As needed. DOXAZOSIN (CARDURA) 4 MG TABLET    Take 1 tablet by mouth 2 times daily    ELIQUIS 5 MG TABS TABLET    TAKE 1 TABLET BY MOUTH TWICE DAILY    ESCITALOPRAM (LEXAPRO) 10 MG TABLET    Take 1 tablet by mouth daily    FUROSEMIDE (LASIX) 20 MG TABLET    Take 1 tablet by mouth 2 times daily    GABAPENTIN (NEURONTIN) 400 MG CAPSULE    Take 1 capsule by mouth 2 times daily for 90 days. GAUZE PADS & DRESSINGS MISC    Please dispense 4x8 guaze, kerlix, and ace    INSULIN ASPART (NOVOLOG FLEXPEN) 100 UNIT/ML INJECTION PEN    Inject 8 Units into the skin 3 times daily (before meals)    INSULIN GLARGINE (BASAGLAR KWIKPEN) 100 UNIT/ML INJECTION PEN    Inject 50 Units into the skin daily    INSULIN PEN NEEDLE 31G X 5 MM MISC    1 each by Does not apply route daily    LANCETS MISC    1 each by Does not apply route 2 times daily PHARMACY MAY SUBSTITUTE TO TRUE METRIX LANCETS    METHADONE (DOLOPHINE) 10 MG/ML SOLUTION    Take 140 mg by mouth daily. Verified dose with [facility] ([phone]) 6/18/21    METOPROLOL SUCCINATE (TOPROL XL) 100 MG EXTENDED RELEASE TABLET    Take 1 tablet by mouth daily    NYSTATIN (MYCOSTATIN) 218359 UNIT/GM POWDER    Apply topically 2 times daily Apply to right breast and groin area BID    OMEPRAZOLE (PRILOSEC) 20 MG DELAYED RELEASE CAPSULE    Take 1 capsule by mouth every morning       Allergies     She is allergic to sulfa antibiotics. Physical Exam     INITIAL VITALS: BP: 127/64,  , Pulse: 68, Resp: 16, SpO2: 94 %  Physical Exam  Vitals and nursing note reviewed. Constitutional:       General: She is not in acute distress. Appearance: Normal appearance. She is obese. She is not ill-appearing, toxic-appearing or diaphoretic. HENT:      Head: Normocephalic and atraumatic. Eyes:      Extraocular Movements: Extraocular movements intact.    Cardiovascular:      Rate and Rhythm: Normal rate and regular rhythm. Pulses: Normal pulses. Heart sounds: Normal heart sounds. No murmur heard. No friction rub. No gallop. Pulmonary:      Effort: Pulmonary effort is normal. No respiratory distress. Abdominal:      General: Abdomen is flat. There is no distension. Tenderness: There is no abdominal tenderness. There is no guarding or rebound. Musculoskeletal:      Comments: 1+ bilateral lower leg pitting edema, no erythema or warmth of the lower extremity,  Full-thickness ulceration on the plantar lateral aspect of both feet, no surrounding fluctuance, small amount of serous drainage on the dressing, no malodor   Neurological:      Mental Status: She is alert. Diagnostic Results     RADIOLOGY:  XR CHEST PORTABLE   Final Result      Mild cardiomegaly and mild interstitial edema. LABS:   Results for orders placed or performed during the hospital encounter of 58/18/03   Basic Metabolic Panel w/ Reflex to MG   Result Value Ref Range    Sodium 133 (L) 136 - 145 mmol/L    Potassium reflex Magnesium 4.5 3.5 - 5.1 mmol/L    Chloride 99 99 - 110 mmol/L    CO2 22 21 - 32 mmol/L    Anion Gap 12 3 - 16    Glucose 91 70 - 99 mg/dL    BUN 42 (H) 7 - 20 mg/dL    CREATININE 2.5 (H) 0.6 - 1.1 mg/dL    GFR Non-African American 20 (A) >60    GFR  24 (A) >60    Calcium 8.6 8.3 - 10.6 mg/dL   Brain Natriuretic Peptide   Result Value Ref Range    Pro-BNP 4,733 (H) 0 - 124 pg/mL   Troponin   Result Value Ref Range    Troponin 0.04 (H) <0.01 ng/mL   Lactate, Sepsis   Result Value Ref Range    Lactic Acid, Sepsis 1.0 0.4 - 1.9 mmol/L   SPECIMEN REJECTION   Result Value Ref Range    Rejected Test cbcwd     Reason for Rejection see below        ED BEDSIDE ULTRASOUND:  None    RECENT VITALS:  BP: 127/64,  , Pulse: 68, Resp: 16, SpO2: 94 %       3/2018 ECHO  Conclusions      Summary   Left ventricular cavity size is normal. Normal left ventricular wall   thickness.  Left ventricular function is low normal with ejection fraction   estimated at 50-55%. Septal wall appears hypokinetic. Normal diastolic   function. Mitral annular calcification is present. Mild mitral regurgitation   is present. Moderate tricuspid regurgitation. The right atrium is dilated. Mild pulmonic regurgitation present. Estimated pulmonary artery systolic   pressure is at 65 mmHg assuming a right atrial pressure of 15 mmHg. Procedures   None    ED Course     Nursing Notes, Past Medical Hx,Past Surgical Hx, Social Hx, Allergies, and Family Hx were reviewed. The patient was given the following medications:  Orders Placed This Encounter   Medications    furosemide (LASIX) injection 40 mg       CONSULTS:  [address] / ASSESSMENT / Gabriele Ab is a 60 y.o. female presenting to the emergency department because she is concerned that she has peripheral edema. Patient told nursing staff that symptoms have been present for the last 6 weeks, she told me that they have been present for the last 2 weeks. Upon presentation she was seated on the stretcher in no acute distress. She had stable vitals with oxygen saturations of 94 to 96% on room air. No rhonchi or wheezing present, speaking in complete sentences. On exam she had 1-2+ pitting edema of bilateral lower extremities with no overlying erythema or warmth, no venous stasis dermatitis. She had intact distal pulses. Diabetic wounds on the plantar aspect of both feet are well-healing, they were recently debrided on 9/27/2021 by podiatry and she is currently on clindamycin and Cipro. Work-up today was concerning for fluid overloaded status. Chest x-ray showed mild cardiomegaly, mild interstitial edema, BNP was elevated from baseline at 4733. She is on 40 mg of Lasix daily and was given 40 IV in the ED. She has had no difficulty with urination. She has a history of CKD and creatinine was baseline.   Plan to admit to the medicine service for diuresis and further evaluation, most recent echocardiogram was in 2018 with no evidence of diastolic dysfunction with preserved ejection fraction. .    This patient was also evaluated by the attending physician. All care plans were discussed and agreed upon. Clinical Impression     1. Acute on chronic congestive heart failure, unspecified heart failure type ([facility] [address]      Disposition     PATIENT REFERRED TO:  No follow-up provider specified.     DISCHARGE MEDICATIONS:  New Prescriptions    No medications on file       DISPOSITION Decision To Admit 10/01/2021 07:58:35 PM       Renee Sauer PA-C  10/01/21 2040

## 2021-10-01 NOTE — ED TRIAGE NOTES
Pt presents to ED via EMS for bilateral leg pain and swelling for the past 6 weeks, but has gotten worse today.

## 2021-10-02 PROBLEM — E11.628 TYPE 2 DIABETES MELLITUS WITH RIGHT DIABETIC FOOT INFECTION (HCC): Status: ACTIVE | Noted: 2021-01-01

## 2021-10-02 PROBLEM — E66.09 CLASS 2 OBESITY DUE TO EXCESS CALORIES WITH BODY MASS INDEX (BMI) OF 37.0 TO 37.9 IN ADULT: Status: ACTIVE | Noted: 2019-06-03

## 2021-10-02 PROBLEM — L08.9 TYPE 2 DIABETES MELLITUS WITH RIGHT DIABETIC FOOT INFECTION (HCC): Status: ACTIVE | Noted: 2021-01-01

## 2021-10-02 NOTE — CONSULTS
Infectious Diseases   Consult Note        Admission Date: 10/1/2021  Hospital Day: Hospital Day: 2   Attending: Gonzalez Tyson MD  Date of service: 10/2/21     Reason for admission: Systolic CHF, acute ([facility] [address] [I50.21]  Acute on chronic congestive heart failure, unspecified heart failure type [facility]) [I50.9]    Chief complaint/ Reason for consult: Complicated diabetic foot infection    Microbiology:        I have reviewed allavailable micro lab data and cultures    · Bilateral foot wound culture  - collected on 10/1/2021: In process      Antibiotics and immunizations:       Current antibiotics: All antibiotics and their doses were reviewed by me    Recent Abx Admin                   vancomycin (VANCOCIN) 2,250 mg in dextrose 5 % 500 mL IVPB (mg) 2,250 mg New Bag 10/02/21 1203                  Immunization History: All immunization history was reviewed by me today. Immunization History   Administered Date(s) Administered    Influenza 11/08/2011    Influenza Virus Vaccine 09/12/2014, 10/16/2015, 10/27/2017    Influenza, Quadv, 6 mo and older, IM, PF (Flulaval, Fluarix) 01/05/2019    Influenza, Quadv, IM, PF (6 mo and older Fluzone, Flulaval, Fluarix, and 3 yrs and older Afluria) 11/10/2016, 10/03/2019    Pneumococcal Polysaccharide ([id number]) 11/13/2015    Tdap (Boostrix, Adacel) 07/22/2014       Known drug allergies: All allergies were reviewed and updated    Allergies   Allergen Reactions    Sulfa Antibiotics Hives, Itching and Rash       Social history:     Social History:  All social andepidemiologic history was reviewed and updated by me today as needed. · Tobacco use:   reports that she quit smoking about 3 years ago. Her smoking use included cigarettes. She has a 30.00 pack-year smoking history. She has never used smokeless tobacco.  · Alcohol use:   reports no history of alcohol use. · Currently lives in: [address] [place] [phone]  ·  reports no history of drug use.      COVID VACCINATION AND LAB RESULT RECORDS:     Internal Administration   First Dose      Second Dose           Last COVID Lab SARS-CoV-2, PCR (no units)   Date Value   03/25/2021 Not Detected     SARS-CoV-2, RENEA (no units)   Date Value   10/01/2020 NOT DETECTED            Assessment:     The patient is a 60 y.o. old female who  has a past medical history of Amenorrhea, Asthma (05/14/2004), Bacterial vaginosis (04/2008), Carpal tunnel syndrome (05/2007), COPD (chronic obstructive pulmonary disease) ([facility] [address] Diabetes mellitus type II (08/2007), Diabetic neuropathy ([facility] [address] (12/4675), Diastolic CHF ([facility] [address] DVT (deep venous thrombosis) ([facility] [address] (03/2004), Dyslipidemia (05/2009), Dyspareunia (05/2009), ETOH abuse (03/04/2007), Feet clawing, HTN (hypertension), blood clots, Hyperlipidemia, MRSA (methicillin resistant staph aureus) culture positive (11/06/2017; 11/17/2017), Neuropathy (05/2009), Pain, back (04/2008), Pancreatitis (05/14/2004), Pseudocyst, pancreas (05/14/2004), Scalp lesion (08/2007), Tinea pedis, Tobacco abuse (03/2008), Uses walker, Uses wheelchair, and Wears dentures. with following problems:    · Complicated bilateral diabetic foot infection with extensive necrotic plantar ulcers  · Diabetic polyneuropathy type II  · Chronic kidney disease stage III  · Bilateral leg lymphedema  · Elevated CRP of 205.1  · Elevated sed rate of greater than 120  · Charcot's arthropathy  · History of right foot transmetatarsal amputation  · Essential hypertension  · History of alcoholism  · Hyperlipidemia  · History of osseous hemangioma of L2 vertebral body  · History of MRSA  · Ex-smoker  · Obesity Class 2 due to excess calorie intake : Body mass index is 37.13 kg/m². Discussion:      The patient has poorly controlled type 2 diabetes mellitus with diabetic polyneuropathy and history of right transmetatarsal amputation. She has been admitted with worsening bilateral foot pain.     The patient has chronic bilateral foot ulcers on the plantar follows: Vancomycin trough, CBC, CMP  · Continue to watch for following: new or worsening fever, hypotension, hives, lip swelling and redness or purulence at vascular access sites. I/v access Management:    · Continue to monitor i.v access sites for erythema, induration, discharge or tenderness. · As always, continue efforts to minimizetubes/lines/drains as clinically appropriate to reduce chances of line associated infections. Current isolation precautions: There are no current isolations documented for this patient. Level of complexity of consult: High     Risk of Complications/Morbidity: High     · Illness(es)/ Infection present that pose threat to life/bodily function. · There is potential for severe exacerbation of infection/side effects of treatment. · Therapy requires intensive monitoring for antimicrobial agent toxicity. Thank you for involving me in the care of your patient. I will continue to follow. If you have any additional questions, please do not hesitate to contact me. Subjective:     Presenting complaint in ER:     Chief Complaint   Patient presents with    Leg Pain     x 2 weeks. HPI: Ronni Fofana is a 60 y.o. female patient, who was seen at the request of Dr. Karrie Lara MD.    History was obtained from chart review and the patient. The patient was admitted on 10/1/2021. I have been consulted to see the patient for above mentioned reason(s). The patient has multiple medical comorbidities, and presented to the ER for bilateral lower extremity pain. The patient has longstanding type 2 diabetes mellitus with diabetic arthropathy and chronic kidney disease stage III. She has chronic bilateral foot ulcers with concerns for osteomyelitis in both feet. The patient was hospitalized. I have been asked for my opinion for management for this patient. Past Medical History: All past medical history reviewed today.     Past Medical History:   Diagnosis Date    Amenorrhea     Asthma 2004    Bacterial vaginosis 2008    Carpal tunnel syndrome 2007    COPD (chronic obstructive pulmonary disease) ([facility] [address]     Diabetes mellitus type II 2007    10/1/20 pt states does accucheck 2x/day at home    Diabetic neuropathy ([facility] [address]     Diastolic CHF ([facility] [address]     DVT (deep venous thrombosis) ([facility] [address] 2004    Dyslipidemia 2009    Dyspareunia 2009    ETOH abuse 2007    Feet clawing     HTN (hypertension)     Hx of blood clots     Hyperlipidemia     MRSA (methicillin resistant staph aureus) culture positive 2017; 2017    foot; leg     Neuropathy 2009    polyneuropathy    Pain, back 2008    Pancreatitis 2004    Pseudocyst, pancreas 2004    Scalp lesion 2007    Tinea pedis     Tobacco abuse 2008    Uses walker     Uses wheelchair     also uses walker    Wears dentures          Past Surgical History: All pastsurgical history was reviewed today.     Past Surgical History:   Procedure Laterality Date     SECTION  unknown    FOOT DEBRIDEMENT Right 2019    INCISION AND DRAINAGE WITH APPLICATION OF STRAVIX GRAFT RIGHT FOOT performed by Krista Santos DPM at [address] [place] [address] Right 2019    RIGHT FOOT INCISION AND DRAINAGE WITH STAGING TRANSMETATARSAL AMPUTATION performed by Krista Santos DPM at [address] [place] [address] Right 2019    RIGHT FOOT DEBRIDEMENT INCISION AND DRAINAGE, OPEN DIABETIC FOOT ULCER WITH GRAFT PLACEMENT performed by Krista Santos DPM at [address] [place] [address] Left 10/23/2019    LEFT FOOT INCISION AND DRAINAGE , DEBRIDEMENT OF OPEN WOUND, APPLICATION OF STRAVIX GRAFT performed by Krista Santos DPM at [address] [place] [address] Right 3/29/2021    INCISION AND DRAINAGE, DEBRIDEMENT OF DIABETIC WOUND WITH PLACEMENT OF STRAVIX GRAFT RIGHT FOOT performed by Krista Santos DPM at [place] [address] Left     from falling off ladder -- has screws in place pt report    OTHER SURGICAL HISTORY Left 05/25/2016    I & D left foot    OTHER SURGICAL HISTORY Right 10/20/2017    RIGHT GASTROC LENGTHENING ENDOSCOPIC, INJECTION OF AMNI GRAFT    OTHER SURGICAL HISTORY Right 04/26/2018    Diabetic foot ulcer I&D w/ integra graft application    SD DEBRIDEMENT, SKIN, SUB-Q TISSUE,MUSCLE,BONE,=<20 SQ CM Right 8/17/2018    RIGHT FOOT DEBRIDEMENT INCISION AND DRAINAGE, PARTIAL 5TH RAY AMPUTATION performed by Grayson Matthews DPM at [address] [facility] / [address]  [phone]/[phone]    cryotherapy done on lesion    TOE AMPUTATION Left 02/24/2017    AMPUTATION LEFT GREAT TOE                 TONSILLECTOMY           Family History: All family history was reviewed today. History reviewed. No pertinent family history. Medications: All current and past medications were reviewed. Medications Prior to Admission: gabapentin (NEURONTIN) 400 MG capsule, Take 1 capsule by mouth 2 times daily for 90 days. doxazosin (CARDURA) 4 MG tablet, Take 1 tablet by mouth 2 times daily  ELIQUIS 5 MG TABS tablet, TAKE 1 TABLET BY MOUTH TWICE DAILY  escitalopram (LEXAPRO) 10 MG tablet, Take 1 tablet by mouth daily  omeprazole (PRILOSEC) 20 MG delayed release capsule, Take 1 capsule by mouth every morning  furosemide (LASIX) 20 MG tablet, Take 1 tablet by mouth 2 times daily  methadone (DOLOPHINE) 10 MG/ML solution, Take 140 mg by mouth daily. Verified dose with [facility] ([phone]) 6/18/21  amitriptyline (ELAVIL) 100 MG tablet, TAKE 1 TABLET BY MOUTH EVERY NIGHT AT BEDTIME  metoprolol succinate (TOPROL XL) 100 MG extended release tablet, Take 1 tablet by mouth daily  atorvastatin (LIPITOR) 20 MG tablet, Take 1 tablet by mouth nightly  ammonium lactate (LAC-HYDRIN) 12 % lotion, Apply topically daily.  (Patient taking differently: Apply topically as needed Apply topically daily.)  insulin glargine (Teodora Juanrad) 100 UNIT/ML injection pen, Inject 50 Units into the skin daily  nystatin (MYCOSTATIN) 889582 UNIT/GM powder, Apply topically 2 times daily Apply to right breast and groin area BID  aspirin EC 81 MG EC tablet, Take 1 tablet by mouth daily  albuterol sulfate  (90 Base) MCG/ACT inhaler, Inhale 2 puffs into the lungs every 6 hours as needed for Wheezing (Patient taking differently: Inhale 2 puffs into the lungs every 6 hours as needed for Wheezing )  insulin aspart (NOVOLOG FLEXPEN) 100 UNIT/ML injection pen, Inject 8 Units into the skin 3 times daily (before meals)  blood glucose test strips (TRUE METRIX BLOOD GLUCOSE TEST) strip, 1 each by In Vitro route 2 times daily As needed. Insulin Pen Needle 31G X 5 MM MISC, 1 each by Does not apply route daily  Lancets MISC, 1 each by Does not apply route 2 times daily PHARMACY MAY SUBSTITUTE TO TRUE METRIX LANCETS  Gauze Pads & Dressings MISC, Please dispense 4x8 guaze, kerlix, and ace     furosemide  40 mg IntraVENous BID    vancomycin  25 mg/kg IntraVENous Once    vancomycin (VANCOCIN) intermittent dosing (placeholder)   Other See Admin Instructions    meropenem  1,000 mg IntraVENous Q12H    amitriptyline  100 mg Oral Nightly    aspirin EC  81 mg Oral Daily    atorvastatin  20 mg Oral Nightly    [Held by provider] doxazosin  4 mg Oral BID    apixaban  5 mg Oral BID    escitalopram  10 mg Oral Daily    gabapentin  400 mg Oral BID    methadone  140 mg Oral Daily    [Held by provider] metoprolol succinate  100 mg Oral Daily    pantoprazole  40 mg Oral Daily    sodium chloride flush  5-40 mL IntraVENous 2 times per day    influenza virus vaccine  0.5 mL IntraMUSCular Prior to discharge          REVIEW OF SYSTEMS:       Review of Systems   Constitutional: Negative for chills, diaphoresis and unexpected weight change. HENT: Negative for congestion, ear discharge, ear pain, facial swelling, hearing loss, rhinorrhea and trouble swallowing.     Eyes: Negative for photophobia, discharge, redness and visual disturbance. Respiratory: Negative for apnea, cough, choking, chest tightness, shortness of breath and stridor. Cardiovascular: Negative for chest pain and palpitations. Gastrointestinal: Negative for abdominal pain, blood in stool, diarrhea and nausea. Endocrine: Negative for polydipsia, polyphagia and polyuria. Genitourinary: Negative for difficulty urinating, dysuria, frequency, hematuria, menstrual problem and vaginal discharge. Musculoskeletal: Negative for arthralgias, joint swelling, myalgias and neck stiffness. Skin: Negative for color change and rash. Bilateral foot wounds   Allergic/Immunologic: Negative for immunocompromised state. Neurological: Negative for dizziness, seizures, speech difficulty, light-headedness and headaches. Hematological: Negative for adenopathy. Psychiatric/Behavioral: Negative for agitation, hallucinations and suicidal ideas. Objective:       PHYSICAL EXAM:      Vitals:   Vitals:    10/01/21 2230 10/02/21 0106 10/02/21 0430 10/02/21 1151   BP: 126/60 93/74 125/60 128/64   Pulse: 58 55 57 66   Resp: 18 16 16 18   Temp: 97.8 °F (36.6 °C) 97.7 °F (36.5 °C) 97.5 °F (36.4 °C) 97.5 °F (36.4 °C)   TempSrc: Oral Oral Oral Oral   SpO2: 93% 92% 93% 96%   Weight:       Height:           Physical Exam  Vitals and nursing note reviewed. Constitutional:       General: She is not in acute distress. Appearance: She is well-developed. She is not diaphoretic. HENT:      Head: Normocephalic. Right Ear: External ear normal.      Left Ear: External ear normal.      Nose: Nose normal.   Eyes:      General: No scleral icterus. Right eye: No discharge. Left eye: No discharge. Conjunctiva/sclera: Conjunctivae normal.      Pupils: Pupils are equal, round, and reactive to light. Cardiovascular:      Rate and Rhythm: Normal rate and regular rhythm. Heart sounds: No murmur heard.    No friction rub. Pulmonary:      Effort: Pulmonary effort is normal.      Breath sounds: No stridor. No wheezing or rales. Chest:      Chest wall: No tenderness. Abdominal:      Palpations: Abdomen is soft. There is no mass. Tenderness: There is no abdominal tenderness. There is no guarding or rebound. Musculoskeletal:         General: Swelling and tenderness present. Cervical back: Normal range of motion and neck supple. Lymphadenopathy:      Cervical: No cervical adenopathy. Skin:     General: Skin is warm and dry. Findings: Erythema present. No rash. Comments: Deep necrotic appearing wounds on the plantar side of both feet. Status post transmetatarsal amputation of the right foot   Neurological:      Mental Status: She is alert and oriented to person, place, and time. Motor: No abnormal muscle tone. Psychiatric:         Judgment: Judgment normal.                                 Lines and drains: All vascular access sites are healthy with no local erythema, discharge or tenderness. Intake and output:     No intake/output data recorded. Lab Data:   All available labs were reviewed by me today.      CBC:   Recent Labs     10/01/21  1947 10/02/21  0901   WBC 8.2 7.1   RBC 2.79* 3.02*   HGB 7.4* 7.8*   HCT 22.7*  22.6* 24.3*    300   MCV 80.8 80.5   MCH 26.4 25.8*   MCHC 32.7 32.1   RDW 16.9* 16.6*        BMP:  Recent Labs     10/01/21  1912 10/02/21  0901   * 139   K 4.5 4.1   CL 99 104   CO2 22 24   BUN 42* 43*   CREATININE 2.5* 2.4*   CALCIUM 8.6 8.6   GLUCOSE 91 110*        Hepatic FunctionPanel:   Lab Results   Component Value Date    ALKPHOS 131 10/01/2021    ALT 13 10/01/2021    AST 16 10/01/2021    PROT 6.9 10/01/2021    PROT 6.6 07/21/2011    BILITOT <0.2 10/01/2021    BILIDIR <0.2 10/01/2021    IBILI see below 10/01/2021    LABALBU 2.6 10/01/2021       CPK:   Lab Results   Component Value Date    CKTOTAL 55 03/21/2018     ESR:   Lab Results   Component Value Date    SEDRATE >120 (H) 10/01/2021     CRP:   Lab Results   Component Value Date    .1 (H) 10/01/2021         Imaging: All pertinent images and reports for the current visit were reviewed by meduring this visit. XR FOOT LEFT (MIN 3 VIEWS)   Final Result      Numerous osteotomies with soft tissue swelling and possible ulcer lateral aspect of the right midfoot. No plain radiographic evidence for osteomyelitis, however plain film findings for osteomyelitis are often late findings. 3 views left foot      FINDINGS:      There is a mottled appearance of the phalanges of the right fifth digit as well as the fifth metatarsal. The remaining metatarsals unremarkable in appearance. Patient's had prior osteotomy of the first distal phalangeal tuft. IMPRESSION:      Plain radiographic evidence for osteomyelitis involving the fifth phalanges and fifth metatarsal. Patient's bones are osteopenic. XR FOOT RIGHT (MIN 3 VIEWS)   Final Result      Numerous osteotomies with soft tissue swelling and possible ulcer lateral aspect of the right midfoot. No plain radiographic evidence for osteomyelitis, however plain film findings for osteomyelitis are often late findings. 3 views left foot      FINDINGS:      There is a mottled appearance of the phalanges of the right fifth digit as well as the fifth metatarsal. The remaining metatarsals unremarkable in appearance. Patient's had prior osteotomy of the first distal phalangeal tuft. IMPRESSION:      Plain radiographic evidence for osteomyelitis involving the fifth phalanges and fifth metatarsal. Patient's bones are osteopenic. XR CHEST PORTABLE   Final Result      Mild cardiomegaly and mild interstitial edema.          VL DUP LOWER EXTREMITY ARTERIES BILATERAL    (Results Pending)   MRI FOOT RIGHT WO CONTRAST    (Results Pending)   MRI FOOT LEFT WO CONTRAST    (Results Pending)   MRI FOOT RIGHT WO CONTRAST    (Results Pending) Outside records:    Labs, Microbiology, Radiology and pertinent results from Care everywhere, if available, were reviewed as a part ofthe consultation. Problem list:       Patient Active Problem List   Diagnosis Code    Feet clawing Q66.89    Diabetic neuropathy ([facility] [address] E11.40    Hyperlipidemia E78.5    HTN (hypertension) I10    Amenorrhea N91.2    Dyspareunia [id number]    Neuropathy G62.9    Bacterial vaginosis N76.0, B96.89    Low back pain M54.50    Anomalies of nails Q84.6    Tobacco abuse Z72.0    Carpal tunnel syndrome G56.00    Scalp lesion L98.9    ETOH abuse F10.10    Pseudocyst, pancreas K86.3    Asthma J45.909    Nicotine addiction F17.200    Hypoxia R09.02    Onychomycosis B35.1    Depression F32. A    Anxiety state F41.1    Tinea pedis B35.3    Burn T30.0    Obesity (BMI 30-39. 9) E66.9    S/P foot surgery, right Z98.890    Open wound of left foot with complication O52.610J    Cellulitis L03.90    Bilateral lower extremity edema R60.0    Chronic multifocal osteomyelitis of left foot ([facility]) P42.022    Acute systolic congestive heart failure ([facility]) I50.21    Acute osteomyelitis of metatarsal bone of right foot ([facility]) M86.171    Bronchitis J40    Cellulitis and abscess of foot L03.119, L02.619    Group B streptococcal infection A49.1    Tendonitis, Achilles, right M76.61    Diabetic ulcer of right midfoot associated with type 2 diabetes mellitus, limited to breakdown of skin ([facility]) E11.621, L97.411    Opiate dependence ([facility]) F11.20    Class 2 obesity due to excess calories with body mass index (BMI) of 37.0 to 37.9 in adult E66.09, Z68.37    CKD (chronic kidney disease), stage III ([facility]) N18.30    Diabetic foot infection ([facility]) E11.628, L08.9    Chronic osteomyelitis of right foot with draining sinus ([facility]) M86.471    FAHEEM (acute kidney injury) ([facility]) N17.9    Acute blood loss anemia D62    Aspiration pneumonitis ([facility]) J69.0    Altered mental status R41.82    Chronic systolic heart failure (HCC) I50.22    Gastroesophageal reflux disease K21.9    Type 2 diabetes mellitus with right diabetic foot infection (HCC) E11.628, R93.6    Systolic CHF, acute (HCC) I50.21    Type 2 diabetes mellitus with left diabetic foot infection (HCC) E11.628, L08.9    Lymphedema of left leg I89.0    Lymphedema of right lower extremity I89.0    Charcot's joint of ankle M14.679    Elevated sed rate R70.0    Elevated C-reactive protein (CRP) R79.82    History of transmetatarsal amputation of right foot (HCC) Z89.431    History of alcoholism ([address] F10.21    Ex-smoker Z87.891    History of MRSA infection Z86.14         Please note that this chart was generated using Dragon dictation software. Although every effort was made to ensure the accuracy of this automated transcription, some errors in transcription may have occurred inadvertently. If you may need any clarification, please do not hesitate to contact me through EPIC or at the phone number provided below with my electronic signature. Any pictures or media included in this note were obtained after taking informed verbal consent from the patient and with their approval to include those in the patient's medical record.       Dustin ePna MD, MPH  10/2/21, 12:22 PM EDT   [facility]   [address] [place] [address], [address] [place] [address]  Office: [phone]  Fax: [phone]  Clinic days:  Tuesday & Thursday

## 2021-10-02 NOTE — H&P
Internal Medicine  PGY-1  History & Physical      CC: B/l leg pain X2-6 weeks. History Obtained From: Patient, chart. HISTORY OF PRESENT ILLNESS:    Dia CharltonSherry Plunkett is a 59 yo F with PMH CKD4, DVT (2004; on Eqliquis), T2DM c/b b/l lower extremity ulcers (follows with Podiatry outpatient, last visit 9/27/21), HTN, narcotic-dependent chronic pain (on Methadone 140 mg/day) who presented for progressively worsening b/l lower extremity pain for the 2 weeks. Patient states she presented today with pain in legs so severe that it is now inhibiting her ability to ambulate. She endorses that the legs are also more swollen and warm to touch with pain when she touches it. She endorses she tries to eat a healthy diet low in salt, she also denies any major weight fluctuations recently. On ROS, denies fever/chills, nausea/vomiting, diarrhea/constipation, urinary issues including dysuria, hematuria, or changes to amount or flow of urine. She denies abdominal pain. On arrival to [place] [address] ED, VSS. Patient appeared extremely sleepy on physical exam. Chemistry significant for Na 133, BUN 42/Cr 2.5. Pro-BNP elevated 4,733 (baseline 2-3k). Trops 0.04. CBC with Hgb 9.1 Hct 28.5 at baseline. CXR with mild cardiomegaly and interstitial edema. She was given IV Lasix 40 in the ED and admitted to the floor.      Past Medical History:        Diagnosis Date    Amenorrhea     Asthma 05/14/2004    Bacterial vaginosis 04/2008    Carpal tunnel syndrome 05/2007    COPD (chronic obstructive pulmonary disease) ([address]     Diabetes mellitus type II 08/2007    10/1/20 pt states does accucheck 2x/day at home    Diabetic neuropathy ([address] 91/6639    Diastolic CHF ([address]     DVT (deep venous thrombosis) ([address] 03/2004    Dyslipidemia 05/2009    Dyspareunia 05/2009    ETOH abuse 03/04/2007    Feet clawing     HTN (hypertension)     Hx of blood clots     Hyperlipidemia     MRSA (methicillin resistant staph aureus) culture positive 2017; 2017    foot; leg     Neuropathy 2009    polyneuropathy    Pain, back 2008    Pancreatitis 2004    Pseudocyst, pancreas 2004    Scalp lesion 2007    Tinea pedis     Tobacco abuse 2008    Uses walker     Uses wheelchair     also uses walker    Wears dentures        Past Surgical History:        Procedure Laterality Date     SECTION  unknown    FOOT DEBRIDEMENT Right 2019    INCISION AND DRAINAGE WITH APPLICATION OF STRAVIX GRAFT RIGHT FOOT performed by Brenna Prasad DPM at [address] [place] [address] Right 2019    RIGHT FOOT INCISION AND DRAINAGE WITH STAGING TRANSMETATARSAL AMPUTATION performed by Brenna Prasad DPM at [address] [place] [address] Right 2019    RIGHT FOOT DEBRIDEMENT INCISION AND DRAINAGE, OPEN DIABETIC FOOT ULCER WITH GRAFT PLACEMENT performed by Brenna Prasad DPM at [address] [place] [address] Left 10/23/2019    LEFT FOOT INCISION AND DRAINAGE , DEBRIDEMENT OF OPEN WOUND, APPLICATION OF STRAVIX GRAFT performed by Brenna Prasad DPM at [address] [place] [address] Right 3/29/2021    INCISION AND DRAINAGE, DEBRIDEMENT OF DIABETIC WOUND WITH PLACEMENT OF STRAVIX GRAFT RIGHT FOOT performed by Brenna Prasad DPM at [facility] Left     from falling off ladder -- has screws in place pt report    OTHER SURGICAL HISTORY Left 2016    I & D left foot    OTHER SURGICAL HISTORY Right 10/20/2017    RIGHT GASTROC LENGTHENING ENDOSCOPIC, INJECTION OF AMNI GRAFT    OTHER SURGICAL HISTORY Right 2018    Diabetic foot ulcer I&D w/ integra graft application    MD DEBRIDEMENT, SKIN, SUB-Q TISSUE,MUSCLE,BONE,=<20 SQ CM Right 2018    RIGHT FOOT DEBRIDEMENT INCISION AND DRAINAGE, PARTIAL 5TH RAY AMPUTATION performed by Brenna Prasad DPM at [address] / [address]  3/6386    cryotherapy done on lesion    TOE AMPUTATION Left 2017    AMPUTATION LEFT GREAT TOE  TONSILLECTOMY         Medications Priorto Admission:    Medications Prior to Admission: gabapentin (NEURONTIN) 400 MG capsule, Take 1 capsule by mouth 2 times daily for 90 days. doxazosin (CARDURA) 4 MG tablet, Take 1 tablet by mouth 2 times daily  ELIQUIS 5 MG TABS tablet, TAKE 1 TABLET BY MOUTH TWICE DAILY  escitalopram (LEXAPRO) 10 MG tablet, Take 1 tablet by mouth daily  omeprazole (PRILOSEC) 20 MG delayed release capsule, Take 1 capsule by mouth every morning  furosemide (LASIX) 20 MG tablet, Take 1 tablet by mouth 2 times daily  methadone (DOLOPHINE) 10 MG/ML solution, Take 140 mg by mouth daily. Verified dose with [facility] 320 ([phone]) 6/18/21  amitriptyline (ELAVIL) 100 MG tablet, TAKE 1 TABLET BY MOUTH EVERY NIGHT AT BEDTIME  metoprolol succinate (TOPROL XL) 100 MG extended release tablet, Take 1 tablet by mouth daily  atorvastatin (LIPITOR) 20 MG tablet, Take 1 tablet by mouth nightly  ammonium lactate (LAC-HYDRIN) 12 % lotion, Apply topically daily. (Patient taking differently: Apply topically as needed Apply topically daily.)  insulin glargine (BASAGLAR KWIKPEN) 100 UNIT/ML injection pen, Inject 50 Units into the skin daily  nystatin (MYCOSTATIN) 676485 UNIT/GM powder, Apply topically 2 times daily Apply to right breast and groin area BID  aspirin EC 81 MG EC tablet, Take 1 tablet by mouth daily  albuterol sulfate  (90 Base) MCG/ACT inhaler, Inhale 2 puffs into the lungs every 6 hours as needed for Wheezing (Patient taking differently: Inhale 2 puffs into the lungs every 6 hours as needed for Wheezing )  insulin aspart (NOVOLOG FLEXPEN) 100 UNIT/ML injection pen, Inject 8 Units into the skin 3 times daily (before meals)  blood glucose test strips (TRUE METRIX BLOOD GLUCOSE TEST) strip, 1 each by In Vitro route 2 times daily As needed.   Insulin Pen Needle 31G X 5 MM MISC, 1 each by Does not apply route daily  Lancets MISC, 1 each by Does not apply route 2 times daily Left lower leg: Edema present. Comments: B/l mild erythema and swelling over lower extremities. Severely tender to palpation throughout. Skin:     General: Skin is warm and dry. Comments: Chronic venous changes over b/l lower extremities. Neurological:      Mental Status: She is oriented to person, place, and time. Cranial Nerves: No cranial nerve deficit. Motor: No weakness. Psychiatric:         Mood and Affect: Mood normal.                        Vitals:    10/02/21 0106   BP: 93/74   Pulse: 55   Resp: 16   Temp: 97.7 °F (36.5 °C)   SpO2: 92%       DATA:    Labs:  CBC:   Recent Labs     10/01/21  1947   WBC 8.2   HGB 7.4*   HCT 22.7*  22.6*          BMP:   Recent Labs     10/01/21  1912   *   K 4.5   CL 99   CO2 22   BUN 42*   CREATININE 2.5*   GLUCOSE 91     LFT's:   Recent Labs     10/01/21  1947   AST 16   ALT 13   BILITOT <0.2   ALKPHOS 131*     Troponin:   Recent Labs     10/01/21  1912 10/02/21  0055   TROPONINI 0.04* 0.04*     BNP:No results for input(s): BNP in the last 72 hours. ABGs: No results for input(s): PHART, BEO6WDL, PO2ART in the last 72 hours. INR: No results for input(s): INR in the last 72 hours. U/A:No results for input(s): NITRITE, COLORU, PHUR, LABCAST, WBCUA, RBCUA, MUCUS, TRICHOMONAS, YEAST, BACTERIA, CLARITYU, SPECGRAV, LEUKOCYTESUR, UROBILINOGEN, BILIRUBINUR, BLOODU, GLUCOSEU, AMORPHOUS in the last 72 hours. Invalid input(s): KETONESU    XR CHEST PORTABLE   Final Result      Mild cardiomegaly and mild interstitial edema. ASSESSMENT AND PLAN:    Peggy Schuster. Jaclyn Blakely is a 61 yo F with PMH CKD4, DVT (on Eqliquis), T2DM c/b b/l lower extremity ulcers (follows with Podiatry outpatient, last visit 9/27/21), HTN, history of pain medication addiction (on Methadone 140 mg/day) who presented for progressively worsening b/l lower extremity pain for the 2 weeks. Acute on Chronic Systolic Heart Failure  Last Echo 2018 with EF 50-55%.

## 2021-10-02 NOTE — CONSULTS
Department of Podiatry Consult Note  Resident       Reason for Consult: Bilateral foot wounds  Requesting Physician: Sri Rodrigues MD    CHIEF COMPLAINT: Bilateral foot wounds    HISTORY OF PRESENT ILLNESS:                The patient is a 60 y.o. female with significant past medical history as listed below. Podiatry was consulted for bilateral foot wounds. Patient is well-known to the podiatry service and follows with Dr. Dre Urbano at [address] [place] [address] podiatry clinic with last visit on 9/27/2021. Patient reports she has been taking her clindamycin and ciprofloxacin, unsure of how many days she has left. Patient reports increased pain to bilateral lower extremity with increased swelling. Currently rates the pain as 6/10 and throbbing. Patient reports that her daughter thinks that the wounds are looking worse to both feet. Patient reports that her daughter has been changing her dressings with Betadine as instructed. Patient reports that she believes the wounds are getting worse due to ambulating in her new cam boots. Patient reports she is supposed to be nonweightbearing but that she has to walk at times. Per chart review, patient has multiple imaging studies over the last year that have not been performed; patient reports she tries to adhere to her doctor's instructions and recommendations but that sometimes she is unable to. Patient denies fever, chills, nausea, vomiting, shortness of breath, chest pain. Patient has no other pedal complaints at this time.      Past Medical History:        Diagnosis Date    Amenorrhea     Asthma 05/14/2004    Bacterial vaginosis 04/2008    Carpal tunnel syndrome 05/2007    COPD ([facility] disease) [facility]     Diabetes mellitus type II 08/2007    10/1/20 pt states does accucheck 2x/day at home    Diabetic neuropathy ([address] 98/0046    Diastolic CHF ([address]     DVT (deep venous thrombosis) ([facility] 03/2004    Dyslipidemia 05/2009    Dyspareunia 05/2009    ETOH abuse 2007    Feet clawing     HTN (hypertension)     Hx of blood clots     Hyperlipidemia     MRSA (methicillin resistant staph aureus) culture positive 2017; 2017    foot; leg     Neuropathy 2009    polyneuropathy    Pain, back 2008    Pancreatitis 2004    Pseudocyst, pancreas 2004    Scalp lesion 2007    Tinea pedis     Tobacco abuse 2008    Uses walker     Uses wheelchair     also uses walker    Wears dentures        Past Surgical History:        Procedure Laterality Date     SECTION  unknown    FOOT DEBRIDEMENT Right 2019    INCISION AND DRAINAGE WITH APPLICATION OF STRAVIX GRAFT RIGHT FOOT performed by Grayson Matthews DPM at [address] Right 2019    RIGHT FOOT INCISION AND DRAINAGE WITH STAGING TRANSMETATARSAL AMPUTATION performed by Grayson Matthews DPM at [address] Right 2019    RIGHT FOOT DEBRIDEMENT INCISION AND DRAINAGE, OPEN DIABETIC FOOT ULCER WITH GRAFT PLACEMENT performed by Grayson Matthews DPM at [address] Left 10/23/2019    LEFT FOOT INCISION AND DRAINAGE , DEBRIDEMENT OF OPEN WOUND, APPLICATION OF STRAVIX GRAFT performed by Grayson Matthews DPM at [address] Right 3/29/2021    INCISION AND DRAINAGE, DEBRIDEMENT OF DIABETIC WOUND WITH PLACEMENT OF STRAVIX GRAFT RIGHT FOOT performed by Grayson Matthews DPM at [facility] 1 Left     from falling off ladder -- has screws in place pt report    OTHER SURGICAL HISTORY Left 2016    I & D left foot    OTHER SURGICAL HISTORY Right 10/20/2017    RIGHT GASTROC LENGTHENING ENDOSCOPIC, INJECTION OF AMNI GRAFT    OTHER SURGICAL HISTORY Right 2018    Diabetic foot ulcer I&D w/ integra graft application    ID DEBRIDEMENT, SKIN, SUB-Q TISSUE,MUSCLE,BONE,=<20 SQ CM Right 2018    RIGHT FOOT DEBRIDEMENT INCISION AND DRAINAGE, PARTIAL 5TH RAY AMPUTATION performed by Grayson Matthews DPM at [facility] [place] [facility]    PRE-MALIGNANT / BENIGN SKIN LESION EXCISION  7/7003    cryotherapy done on lesion    TOE AMPUTATION Left 02/24/2017    AMPUTATION LEFT GREAT TOE                 TONSILLECTOMY         Allergies:   Sulfa antibiotics    Medications:   Home Meds  No current facility-administered medications on file prior to encounter. Current Outpatient Medications on File Prior to Encounter   Medication Sig Dispense Refill    gabapentin (NEURONTIN) 400 MG capsule Take 1 capsule by mouth 2 times daily for 90 days. 180 capsule 0    doxazosin (CARDURA) 4 MG tablet Take 1 tablet by mouth 2 times daily 180 tablet 1    ELIQUIS 5 MG TABS tablet TAKE 1 TABLET BY MOUTH TWICE DAILY 180 tablet 1    escitalopram (LEXAPRO) 10 MG tablet Take 1 tablet by mouth daily 30 tablet 2    omeprazole (PRILOSEC) 20 MG delayed release capsule Take 1 capsule by mouth every morning 30 capsule 5    furosemide (LASIX) 20 MG tablet Take 1 tablet by mouth 2 times daily 180 tablet 1    methadone (DOLOPHINE) 10 MG/ML solution Take 140 mg by mouth daily. Verified dose with [facility] 320 ([phone]) 6/18/21      amitriptyline (ELAVIL) 100 MG tablet TAKE 1 TABLET BY MOUTH EVERY NIGHT AT BEDTIME 30 tablet 2    metoprolol succinate (TOPROL XL) 100 MG extended release tablet Take 1 tablet by mouth daily 90 tablet 1    atorvastatin (LIPITOR) 20 MG tablet Take 1 tablet by mouth nightly 90 tablet 1    ammonium lactate (LAC-HYDRIN) 12 % lotion Apply topically daily. (Patient taking differently: Apply topically as needed Apply topically daily. ) 1 Bottle 0    insulin glargine (BASAGLAR KWIKPEN) 100 UNIT/ML injection pen Inject 50 Units into the skin daily 5 pen 5    nystatin (MYCOSTATIN) 118263 UNIT/GM powder Apply topically 2 times daily Apply to right breast and groin area BID 30 g 3    aspirin EC 81 MG EC tablet Take 1 tablet by mouth daily 60 tablet 3    albuterol sulfate  (90 Base) MCG/ACT inhaler Inhale 2 puffs into the lungs every 6 hours as needed for Wheezing (Patient taking differently: Inhale 2 puffs into the lungs every 6 hours as needed for Wheezing ) 1 Inhaler 5    insulin aspart (NOVOLOG FLEXPEN) 100 UNIT/ML injection pen Inject 8 Units into the skin 3 times daily (before meals) 5 pen 3    blood glucose test strips (TRUE METRIX BLOOD GLUCOSE TEST) strip 1 each by In Vitro route 2 times daily As needed.  200 each 5    Insulin Pen Needle 31G X 5 MM MISC 1 each by Does not apply route daily 100 each 5    Lancets MISC 1 each by Does not apply route 2 times daily PHARMACY MAY SUBSTITUTE TO TRUE METRIX LANCETS 100 each 5    Gauze Pads & Dressings MISC Please dispense 4x8 guaze, kerlix, and ace 1 each 2       Current Meds  furosemide (LASIX) injection 40 mg, Daily  furosemide (LASIX) injection 40 mg, Daily  albuterol (PROVENTIL) nebulizer solution 2.5 mg, Q6H PRN  amitriptyline (ELAVIL) tablet 100 mg, Nightly  aspirin EC tablet 81 mg, Daily  atorvastatin (LIPITOR) tablet 20 mg, Nightly  [Held by provider] doxazosin (CARDURA) tablet 4 mg, BID  apixaban (ELIQUIS) tablet 5 mg, BID  escitalopram (LEXAPRO) tablet 10 mg, Daily  gabapentin (NEURONTIN) capsule 400 mg, BID  methadone (DOLOPHINE) 10 MG/ML solution 140 mg, Daily  [Held by provider] metoprolol succinate (TOPROL XL) extended release tablet 100 mg, Daily  pantoprazole (PROTONIX) tablet 40 mg, Daily  sodium chloride flush 0.9 % injection 5-40 mL, PRN  0.9 % sodium chloride infusion, PRN  ondansetron (ZOFRAN-ODT) disintegrating tablet 4 mg, Q8H PRN   Or  ondansetron (ZOFRAN) injection 4 mg, Q6H PRN  polyethylene glycol (GLYCOLAX) packet 17 g, Daily PRN  acetaminophen (TYLENOL) tablet 650 mg, Q6H PRN   Or  acetaminophen (TYLENOL) suppository 650 mg, Q6H PRN  sodium chloride flush 0.9 % injection 5-40 mL, 2 times per day  perflutren lipid microspheres (DEFINITY) injection 1.65 mg, ONCE PRN  influenza quadrivalent split vaccine (FLUZONE;FLUARIX;FLULAVAL;AFLURIA) injection 0.5 mL, Prior to discharge        Family History:   History reviewed. No pertinent family history. Social History:   TOBACCO:   reports that she quit smoking about 3 years ago. Her smoking use included cigarettes. She has a 30.00 pack-year smoking history. She has never used smokeless tobacco.  ETOH:   reports no history of alcohol use. DRUGS:   reports no history of drug use. REVIEW OF SYSTEMS:    As above. PHYSICAL EXAM:      Vitals:    /60   Pulse 57   Temp 97.5 °F (36.4 °C) (Oral)   Resp 16   Ht 5' 2\" (1.575 m)   Wt 203 lb (92.1 kg)   LMP 10/23/2015   SpO2 93%   BMI 37.13 kg/m²     LABS:   Recent Labs     10/01/21  1947   WBC 8.2   HGB 7.4*   HCT 22.7*  22.6*        Recent Labs     10/01/21  1912   *   K 4.5   CL 99   CO2 22   BUN 42*   CREATININE 2.5*     Recent Labs     10/01/21  1947   PROT 6.9         VASCULAR: DP and PT pulses nonpalpable bilateral.  Upon hand-held Doppler examination, DP and PT signals weakly biphasic bilateral. CFT is brisk to the distal aspect of the foot bilateral. Skin temperature is warm to cool from proximal to distal with no focal calor noted. +2 pitting edema noted bilateral.  No pain with calf compression b/l. NEUROLOGIC: Gross and epicritic sensation is diminished b/l. Protective sensation is diminished at all pedal sites b/l. DERMATOLOGIC:   Right lower extremity:  Full-thickness ulceration noted to the plantar aspect subcuboid and subfifth metatarsal base. Measures 5.5 cm x 4.6 cm x 1.0 cm. Wound base mixture of fibrotic and necrotic tissue with bone exposed and with macerated periwound. Wound does not tunnel or track. Scant serous drainage noted. No fluctuance, crepitus, erythema, or malodor noted. Patient provided verbal consent for today's photos. Serous bulla noted plantar heel with roof intact. Scant fluctuance noted. No crepitus, erythema, malodor, or drainage noted.       Left lower extremity:  Full-thickness ulceration noted plantar lateral aspect beginning at the base of the fifth digit and extending proximally towards the base of the fifth metatarsal and laterally towards the third metatarsal.  Wound measures 7.4 cm x 5.4 cm x 0.3 cm. Wound base mixture of fibrotic and necrotic tissue with macerated periwound. Scant serous drainage noted. Wound does not probe to bone, tunnel, or track. No fluctuance, crepitus, erythema, or malodor noted. MUSCULOSKELETAL: Muscle strength is 4/5 for all pedal groups tested. No pain with palpation of the foot or ankle b/l. Ankle joint ROM is decreased in dorsiflexion with the knee extended. 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 IMAGING:  X-ray bilateral foot 10/2/2021  Narrative   Reason: History of diabetes with low leg ulcers, pain       3 views of the right foot       FINDINGS: Patient's had the fifth metatarsal resected. Patient's bones are osteopenic. There is evidence of a soft tissue defect lateral aspect of the right midfoot with increased radiopacity in this region. Soft tissue swelling surrounds the right foot.    Patient prior amputations of the first second third phalanges as well as the fourth metatarsal head.           Impression       Numerous osteotomies with soft tissue swelling and possible ulcer lateral aspect of the right midfoot. No plain radiographic evidence for osteomyelitis, however plain film findings for osteomyelitis are often late findings.               3 views left foot       FINDINGS:       There is a mottled appearance of the phalanges of the right fifth digit as well as the fifth metatarsal. The remaining metatarsals unremarkable in appearance. Patient's had prior osteotomy of the first distal phalangeal tuft.       IMPRESSION:       Plain radiographic evidence for osteomyelitis involving the fifth phalanges and fifth metatarsal. Patient's bones are osteopenic.        IMPRESSION/RECOMMENDATIONS:      -Diabetic foot ulceration, Carpenter 3, right lower extremity  -Diabetic foot ulceration, [address] 3, left lower extremity  -PVD, bilateral lower extremity  -Edema, bilateral lower extremity  -Diabetes mellitus type 2 with peripheral neuropathy  -History of noncompliance with weightbearing status    -Patient examined and evaluated at the bedside   -VSS. No leukocytosis noted. -ESR >120, .1  -X-rays reviewed, noted above  -MRI ordered bilateral foot  -Arterial duplex ordered  -Patient provided verbal consent for excisional debridement. Utilizing #10 blade, excisional debridement down to and including bone right foot to healthy bleeding tissue margins. 10 cc of bleeding noted. Hemostasis achieved with direct pressure. Patient tolerated procedure well. -Bone biopsy taken right foot, sent for pathology  -Wound culture obtained right foot  -Serous bulla lysed utilizing #10 blade. 3 cc serous drainage noted. -Patient provided verbal consent for excisional debridement. Utilizing #10 blade, excisional debridement down to and including subcutaneous tissue to healthy bleeding tissue margins left foot. 5 cc of bleeding noted. Hemostasis achieved with direct pressure. Patient tolerated procedure well. -Dressing applied to bilateral lower extremity consisting of Betadine soaked gauze, DSD, Ace bandage  -Recommend broad-spectrum IV antibiotics: Vancomycin and cefepime  -Infectious disease consult placed, appreciate recommendations.  -Non-weightbearing bilateral lower extremity  -Pending MRI results, patient will likely require surgical intervention during this in-house stay pending medical clearance. Medicine team please risk stratify for surgical clearance. DISPO: Bilateral diabetic foot ulcerations with underlying osteomyelitis. Labs and x-rays reviewed. MRI bilateral foot and arterial duplex ordered. Bone biopsy sent for pathology. Wound culture obtained.   Patient will likely require surgical intervention during this in-house stay pending MRI results and medical clearance.     - The patient will be staffed with Dr. Mio Reynolds DPM.    Oralia Kaplan DPM  10/02/21  8:00 AM

## 2021-10-02 NOTE — CONSULTS
Office : [phone]     Fax :[phone]       Nephrology Consult Note      Patient's Name: Rosenda Miranda  6:46 PM  10/2/2021    Reason for Consult:  Simoneromeo Thompson on CKD 4       Requesting Physician:  Bahman Escoto MD      Chief Complaint:    Chief Complaint   Patient presents with    Leg Pain     x 2 weeks. History of Present Ilness:    Rosenda Miranda is a 60 y.o. female with past medical h/o CKD4, DVT, T2DM c/b b/l lower extremity ulcers  who presented for progressively worsening b/l lower extremity pain for the 2 weeks. Patient states she presented today with pain in legs so severe that it is now inhibiting her ability to ambulate. She endorses that the legs are also more swollen and warm to touch with pain when she touches it. She endorses she tries to eat a healthy diet low in salt, she also denies any major weight fluctuations recently.     On ROS, denies fever/chills, nausea/vomiting, diarrhea/constipation, urinary issues including dysuria, hematuria, or changes to amount or flow of urine. She denies abdominal pain.      labs. Na 133, BUN 42/Cr 2.5. Pro-BNP elevated 4,733 (baseline 2-3k). Trops 0.04. CBC with Hgb 9.1 Hct 28.5 at baseline. CXR with mild cardiomegaly and interstitial edema. Baseline creat is 2/0   Now elevated at 2.4    Has edema     I/O last 3 completed shifts:   In: 12 [P.O.:240]  Out: 12 [Urine:800]    Past Medical History:   Diagnosis Date    Amenorrhea     Asthma 05/14/2004    Bacterial vaginosis 04/2008    Carpal tunnel syndrome 05/2007    COPD [facility] obstructive pulmonary disease[facility]     Diabetes mellitus type II 08/2007    10/1/20 pt states does accucheck 2x/day at home    Diabetic neuropathy ([address] 38/7359    Diastolic CHF ([facility] [address]     DVT (deep venous thrombosis) [facility] [address] 2004    Dyslipidemia 2009    Dyspareunia 2009    ETOH abuse 2007    Feet clawing     HTN (hypertension)     Hx of blood clots     Hyperlipidemia     MRSA (methicillin resistant staph aureus) culture positive 2017; 2017    foot; leg     Neuropathy 2009    polyneuropathy    Pain, back 2008    Pancreatitis 2004    Pseudocyst, pancreas 2004    Scalp lesion 2007    Tinea pedis     Tobacco abuse 2008    Uses walker     Uses wheelchair     also uses walker    Wears dentures        Past Surgical History:   Procedure Laterality Date     SECTION  unknown    FOOT DEBRIDEMENT Right 2019    INCISION AND DRAINAGE WITH APPLICATION OF STRAVIX GRAFT RIGHT FOOT performed by Jason Bronson DPM at [address] Right 2019    RIGHT FOOT INCISION AND DRAINAGE WITH STAGING TRANSMETATARSAL AMPUTATION performed by Jason Bronson DPM at [address] Right 2019    RIGHT FOOT DEBRIDEMENT INCISION AND DRAINAGE, OPEN DIABETIC FOOT ULCER WITH GRAFT PLACEMENT performed by Jason Bronson DPM at [address] Left 10/23/2019    LEFT FOOT INCISION AND DRAINAGE , DEBRIDEMENT OF OPEN WOUND, APPLICATION OF STRAVIX GRAFT performed by Jason Bronson DPM at [address] Right 3/29/2021    INCISION AND DRAINAGE, DEBRIDEMENT OF DIABETIC WOUND WITH PLACEMENT OF STRAVIX GRAFT RIGHT FOOT performed by Jason Bronson DPM at [facility] Left     from falling off ladder -- has screws in place pt report    OTHER SURGICAL HISTORY Left 2016    I & D left foot    OTHER SURGICAL HISTORY Right 10/20/2017    RIGHT GASTROC LENGTHENING ENDOSCOPIC, INJECTION OF AMNI GRAFT    OTHER SURGICAL HISTORY Right 2018    Diabetic foot ulcer I&D w/ integra graft application    W[place] DEBRIDEMENT, SKIN, SUB-Q TISSUE,MUSCLE,BONE,=<20 SQ CM Right 2018    RIGHT FOOT DEBRIDEMENT INCISION AND DRAINAGE, PARTIAL 5TH RAY AMPUTATION performed by Shanel Devine DPM at [address] [place] / [address]  7/7003    cryotherapy done on lesion    TOE AMPUTATION Left 02/24/2017    AMPUTATION LEFT GREAT TOE                 TONSILLECTOMY         History reviewed. No pertinent family history. reports that she quit smoking about 3 years ago. Her smoking use included cigarettes. She has a 30.00 pack-year smoking history. She has never used smokeless tobacco. She reports that she does not drink alcohol and does not use drugs.         Allergies:  Sulfa antibiotics    Current Medications:    furosemide (LASIX) injection 40 mg, BID  vancomycin (VANCOCIN) intermittent dosing (placeholder), See Admin Instructions  meropenem (MERREM) 1,000 mg in sodium chloride 0.9 % 100 mL IVPB (mini-bag), Q12H  fluconazole (DIFLUCAN) 200 mg IVPB, Q24H  albuterol (PROVENTIL) nebulizer solution 2.5 mg, Q6H PRN  amitriptyline (ELAVIL) tablet 100 mg, Nightly  aspirin EC tablet 81 mg, Daily  atorvastatin (LIPITOR) tablet 20 mg, Nightly  [Held by provider] doxazosin (CARDURA) tablet 4 mg, BID  apixaban (ELIQUIS) tablet 5 mg, BID  escitalopram (LEXAPRO) tablet 10 mg, Daily  gabapentin (NEURONTIN) capsule 400 mg, BID  methadone (DOLOPHINE) tablet 140 mg, Daily  [Held by provider] metoprolol succinate (TOPROL XL) extended release tablet 100 mg, Daily  pantoprazole (PROTONIX) tablet 40 mg, Daily  sodium chloride flush 0.9 % injection 5-40 mL, PRN  0.9 % sodium chloride infusion, PRN  ondansetron (ZOFRAN-ODT) disintegrating tablet 4 mg, Q8H PRN   Or  ondansetron (ZOFRAN) injection 4 mg, Q6H PRN  polyethylene glycol (GLYCOLAX) packet 17 g, Daily PRN  acetaminophen (TYLENOL) tablet 650 mg, Q6H PRN   Or  acetaminophen (TYLENOL) suppository 650 mg, Q6H PRN  sodium chloride flush 0.9 % injection 5-40 mL, 2 times per day  perflutren lipid microspheres (DEFINITY) injection 1.65 mg, ONCE PRN  influenza quadrivalent split vaccine (FLUZONE;FLUARIX;FLULAVAL;AFLURIA) injection 0.5 mL, Prior to discharge        Review of Systems:   14 point ROS obtained but were negative except mentioned in HPI      Physical exam:     Vitals:  /68   Pulse 64   Temp 98.4 °F (36.9 °C) (Oral)   Resp 17   Ht 5' 2\" (1.575 m)   Wt 203 lb (92.1 kg)   LMP 10/23/2015   SpO2 94%   BMI 37.13 kg/m²   Constitutional:  OAA X3 NAD  Skin: no rash, turgor wnl  Heent:  eomi, mmm  Neck: no bruits or jvd noted  Cardiovascular:  S1, S2 without m/r/g  Respiratory: b/L base crackles   Abdomen:  +bs, soft, nt, nd  Ext: 1+  lower extremity edema  Psychiatric: mood and affect appropriate  Musculoskeletal:  Rom, muscular strength intact    Labs:  CBC:   Recent Labs     10/01/21  1947 10/02/21  0901   WBC 8.2 7.1   HGB 7.4* 7.8*    300     BMP:    Recent Labs     10/01/21  1912 10/02/21  0901   * 139   K 4.5 4.1   CL 99 104   CO2 22 24   BUN 42* 43*   CREATININE 2.5* 2.4*   GLUCOSE 91 110*     Ca/Mg/Phos:   Recent Labs     10/01/21  1911 10/01/21  1912 10/02/21  0901   CALCIUM  --  8.6 8.6   MG 1.80  --  1.90     Hepatic:   Recent Labs     10/01/21  1947   AST 16   ALT 13   BILITOT <0.2   ALKPHOS 131*     Troponin:   Recent Labs     10/01/21  1912 10/02/21  0055 10/02/21  0901   TROPONINI 0.04* 0.04* 0.03*     BNP: No results for input(s): BNP in the last 72 hours. Lipids: No results for input(s): CHOL, TRIG, HDL, LDLCALC, LABVLDL in the last 72 hours. ABGs: No results for input(s): PHART, PO2ART, LXE2TLA in the last 72 hours. INR: No results for input(s): INR in the last 72 hours.   UA:  Recent Labs     10/02/21  0815   COLORU Yellow   CLARITYU SL CLOUDY*   GLUCOSEU 100*   BILIRUBINUR Negative   KETUA Negative   SPECGRAV 1.025   BLOODU TRACE-INTACT*   PHUR 6.0  6.0   PROTEINU 100*   UROBILINOGEN 0.2   NITRU Negative   LEUKOCYTESUR Negative   LABMICR YES   URINETYPE NotGiven      Urine Microscopic:   Recent Labs     10/02/21  0815 BACTERIA 3+*   WBCUA 0-2   RBCUA 0-2   EPIU 2-5     Urine Culture: No results for input(s): LABURIN in the last 72 hours. Urine Chemistry: No results for input(s): Key [place], PROTEINUR, NAUR in the last 72 hours. IMAGING:  XR FOOT LEFT (MIN 3 VIEWS)   Final Result      Numerous osteotomies with soft tissue swelling and possible ulcer lateral aspect of the right midfoot. No plain radiographic evidence for osteomyelitis, however plain film findings for osteomyelitis are often late findings. 3 views left foot      FINDINGS:      There is a mottled appearance of the phalanges of the right fifth digit as well as the fifth metatarsal. The remaining metatarsals unremarkable in appearance. Patient's had prior osteotomy of the first distal phalangeal tuft. IMPRESSION:      Plain radiographic evidence for osteomyelitis involving the fifth phalanges and fifth metatarsal. Patient's bones are osteopenic. XR FOOT RIGHT (MIN 3 VIEWS)   Final Result      Numerous osteotomies with soft tissue swelling and possible ulcer lateral aspect of the right midfoot. No plain radiographic evidence for osteomyelitis, however plain film findings for osteomyelitis are often late findings. 3 views left foot      FINDINGS:      There is a mottled appearance of the phalanges of the right fifth digit as well as the fifth metatarsal. The remaining metatarsals unremarkable in appearance. Patient's had prior osteotomy of the first distal phalangeal tuft. IMPRESSION:      Plain radiographic evidence for osteomyelitis involving the fifth phalanges and fifth metatarsal. Patient's bones are osteopenic. XR CHEST PORTABLE   Final Result      Mild cardiomegaly and mild interstitial edema.          VL DUP LOWER EXTREMITY ARTERIES BILATERAL    (Results Pending)   MRI FOOT RIGHT WO CONTRAST    (Results Pending)   MRI FOOT LEFT WO CONTRAST    (Results Pending)   MRI FOOT RIGHT WO CONTRAST    (Results Pending) Assessment/Plan :      1. Lin onc CKD 4   LIN 2/2 multiple factors   Has edema   Continue lasix     2. HTN. BP controlled   Hold cardura     3. Acute on chronic CHF   Continue lasix     4. Dm 2. Needs better control     5. Electrolytes. Monitor and replace.          Recommend to dose adjust all medications  based on renal functions  Maintain SBP> 90 mmHg   Daily weights   AVOID NSAIDs  Avoid Nephrotoxins  Monitor Intake/Output  Call if significant decrease in urine output           Thank you for allowing us to participate in care of Nataliya Borges         Electronically signed by: Kiet Cantu MD, 10/2/2021, 6:46 PM      [facility] [address]  Office : [phone]  Fax :[phone]

## 2021-10-02 NOTE — PROGRESS NOTES
Patient received to room 6307 from the ED. A&O x4 upon arrival but drowsy. Tele monitor applied, rate and rhythm verified with monitor reader, SB to NSR. VSS. Pt has open unstageable wounds to both R and L feet. Wounds cleansed with saline and betadine, wrapped with kerlix and ACE wrap. Oriented to room, staff, and call system. Educated on fall protocol and hourly rounding. Assessment as documented. Admission navigator complete. Bed locked in low position. Pt informed to utilize call light with any needs, verbalized understanding. Call light within reach. Hourly rounding in place. Will continue to monitor.

## 2021-10-02 NOTE — PROGRESS NOTES
Progress Note    Admit Date: 10/1/2021  Day: 1  Diet: ADULT DIET; Regular; Low Sodium (2 gm); 2000 ml  Adult Oral Nutrition Supplement; Standard High Calorie/High Protein Oral Supplement    CC: B/L leg pain for 2-6 weeks    Interval history:   NAONE. Patient remained afebrile, bradycardic and normotensive overnight. Still reporting swelling and tenderness of LE extremity but denies any CP, SOB, PND, NOLAND, palpitations, abdominal pain, c/d, urinary symptoms. HPI:   Breana Nguyễn. Mike Alexander is a 61 yo F with PMH CKD4, DVT (2004; on Eqliquis), T2DM c/b b/l lower extremity ulcers (follows with Podiatry outpatient, last visit 9/27/21), HTN, narcotic-dependent chronic pain (on Methadone 140 mg/day) who presented for progressively worsening b/l lower extremity pain for the 2 weeks. Patient states she presented today with pain in legs so severe that it is now inhibiting her ability to ambulate. She endorses that the legs are also more swollen and warm to touch with pain when she touches it. She endorses she tries to eat a healthy diet low in salt, she also denies any major weight fluctuations recently.     On ROS, denies fever/chills, nausea/vomiting, diarrhea/constipation, urinary issues including dysuria, hematuria, or changes to amount or flow of urine. She denies abdominal pain.      On arrival to [facility] ED, VSS. Patient appeared extremely sleepy on physical exam. Chemistry significant for Na 133, BUN 42/Cr 2.5. Pro-BNP elevated 4,733 (baseline 2-3k). Trops 0.04. CBC with Hgb 9.1 Hct 28.5 at baseline. CXR with mild cardiomegaly and interstitial edema.  She was given IV Lasix 40 in the ED and admitted to the floor.        Medications:     Scheduled Meds:   furosemide  40 mg IntraVENous BID    amitriptyline  100 mg Oral Nightly    aspirin EC  81 mg Oral Daily    atorvastatin  20 mg Oral Nightly    [Held by provider] doxazosin  4 mg Oral BID    apixaban  5 mg Oral BID    escitalopram  10 mg Oral Daily    gabapentin  400 mg Oral BID    methadone  140 mg Oral Daily    [Held by provider] metoprolol succinate  100 mg Oral Daily    pantoprazole  40 mg Oral Daily    sodium chloride flush  5-40 mL IntraVENous 2 times per day    influenza virus vaccine  0.5 mL IntraMUSCular Prior to discharge     Continuous Infusions:   sodium chloride       PRN Meds:albuterol, sodium chloride flush, sodium chloride, ondansetron **OR** ondansetron, polyethylene glycol, acetaminophen **OR** acetaminophen, perflutren lipid microspheres    Objective:   Vitals:   T-max:  Patient Vitals for the past 8 hrs:   BP Temp Temp src Pulse Resp SpO2   10/02/21 0430 125/60 97.5 °F (36.4 °C) Oral 57 16 93 %       Intake/Output Summary (Last 24 hours) at 10/2/2021 1018  Last data filed at 10/2/2021 0810  Gross per 24 hour   Intake    Output 500 ml   Net -500 ml       Review of Systems   Constitutional: Negative for chills and fever. HENT: Negative for congestion and sore throat. Eyes: Negative for visual disturbance. Respiratory: Negative for cough, chest tightness and shortness of breath. Cardiovascular: Negative for chest pain, palpitations and leg swelling. Gastrointestinal: Negative for abdominal distention, abdominal pain, constipation, diarrhea, nausea and vomiting. Genitourinary: Negative for difficulty urinating, dysuria, frequency and urgency. Musculoskeletal: Negative for back pain. Skin: Positive for wound (foot). Negative for rash. Neurological: Negative for dizziness, weakness, light-headedness, numbness and headaches. Psychiatric/Behavioral: The patient is not nervous/anxious. Physical Exam  Constitutional:       General: She is not in acute distress. HENT:      Head: Normocephalic and atraumatic. Mouth/Throat:      Mouth: Mucous membranes are moist.      Pharynx: No oropharyngeal exudate or posterior oropharyngeal erythema. Eyes:      General: No scleral icterus.      Conjunctiva/sclera: Conjunctivae normal. Cardiovascular:      Rate and Rhythm: Regular rhythm. Bradycardia present. Pulses: Normal pulses. Heart sounds: No murmur heard. No gallop. Pulmonary:      Effort: Pulmonary effort is normal. No respiratory distress. Breath sounds: Normal breath sounds. No wheezing or rales. Abdominal:      General: Bowel sounds are normal. There is no distension. Palpations: Abdomen is soft. Tenderness: There is no abdominal tenderness. There is no guarding. Musculoskeletal:         General: Swelling and tenderness present. Cervical back: Neck supple. No tenderness. Right lower leg: Edema present. Left lower leg: Edema present. Comments: Chronic venous changes on b/l LE, mild tenderness, significant swelling with no obvious wounds on B/L lower legs. B/L feet with wounds, not purulent   Skin:     General: Skin is dry. Capillary Refill: Capillary refill takes less than 2 seconds. Comments: Dryness and flaking of skin on B/L extremity. Significant foot wound bilaterally   Neurological:      General: No focal deficit present. Mental Status: She is alert and oriented to person, place, and time. Cranial Nerves: No cranial nerve deficit. Sensory: No sensory deficit. Motor: No weakness. Coordination: Coordination normal.      Gait: Gait normal.      Deep Tendon Reflexes: Reflexes normal.   Psychiatric:         Mood and Affect: Mood normal.         Behavior: Behavior normal.         Thought Content:  Thought content normal.         Judgment: Judgment normal.         LABS:    CBC:   Recent Labs     10/01/21  1947 10/02/21  0901   WBC 8.2 7.1   HGB 7.4* 7.8*   HCT 22.7*  22.6* 24.3*    300   MCV 80.8 80.5     Renal:    Recent Labs     10/01/21  1911 10/01/21  1912 10/02/21  0901   NA  --  133* 139   K  --  4.5 4.1   CL  --  99 104   CO2  --  22 24   BUN  --  42* 43*   CREATININE  --  2.5* 2.4*   GLUCOSE  --  91 110*   CALCIUM  --  8.6 8.6   MG 1.80  --  1.90   ANIONGAP  --  12 11     Hepatic:   Recent Labs     10/01/21  1947   AST 16   ALT 13   BILITOT <0.2   BILIDIR <0.2   PROT 6.9   LABALBU 2.6*   ALKPHOS 131*     Troponin:   Recent Labs     10/01/21  1912 10/02/21  0055   TROPONINI 0.04* 0.04*     BNP: No results for input(s): BNP in the last 72 hours. Lipids: No results for input(s): CHOL, HDL in the last 72 hours. Invalid input(s): LDLCALCU, TRIGLYCERIDE  ABGs:  No results for input(s): PHART, TWN5NID, PO2ART, MRH2HFF, BEART, THGBART, Y2XZZHVI, MGE3DYY in the last 72 hours. INR: No results for input(s): INR in the last 72 hours. Lactate: No results for input(s): LACTATE in the last 72 hours. Cultures:  -----------------------------------------------------------------  RAD:   XR FOOT LEFT (MIN 3 VIEWS)   Final Result      Numerous osteotomies with soft tissue swelling and possible ulcer lateral aspect of the right midfoot. No plain radiographic evidence for osteomyelitis, however plain film findings for osteomyelitis are often late findings. 3 views left foot      FINDINGS:      There is a mottled appearance of the phalanges of the right fifth digit as well as the fifth metatarsal. The remaining metatarsals unremarkable in appearance. Patient's had prior osteotomy of the first distal phalangeal tuft. IMPRESSION:      Plain radiographic evidence for osteomyelitis involving the fifth phalanges and fifth metatarsal. Patient's bones are osteopenic. XR FOOT RIGHT (MIN 3 VIEWS)   Final Result      Numerous osteotomies with soft tissue swelling and possible ulcer lateral aspect of the right midfoot. No plain radiographic evidence for osteomyelitis, however plain film findings for osteomyelitis are often late findings. 3 views left foot      FINDINGS:      There is a mottled appearance of the phalanges of the right fifth digit as well as the fifth metatarsal. The remaining metatarsals unremarkable in appearance. Patient's had prior osteotomy of the first distal phalangeal tuft. IMPRESSION:      Plain radiographic evidence for osteomyelitis involving the fifth phalanges and fifth metatarsal. Patient's bones are osteopenic. XR CHEST PORTABLE   Final Result      Mild cardiomegaly and mild interstitial edema. VL DUP LOWER EXTREMITY ARTERIES BILATERAL    (Results Pending)       Assessment/Plan:      Tatiana Thomas. Sav Guaman is a 61 yo F with PMH CKD4, DVT (on Eqliquis), T2DM c/b b/l lower extremity ulcers (follows with Podiatry outpatient, last visit 9/27/21), HTN, history of pain medication addiction (on Methadone 140 mg/day) who presented for progressively worsening b/l lower extremity pain for the 2 weeks. Osteomyelitis of b/l feet  Recently debrided outpatient on 9/27/21, do not appear acutely infected. Patient on Clinda and Cipro. XR of both feet show osteomyelitis involving 5th phalanges and fifth metatarsal.   - Podiatry consulted - appreciate reccs   - wound care per Podiatry   - F/U wound, fungal and anaerobic cultures  - Antibiotics per podiatry, van/cef    Chronic Systolic Heart Failure  Last Echo 2018 with EF 50-55%. Evidence of PAH which may be contributing. CXR 10/1 with mild cardiomegaly and interstitial edema. Does not report SOB, NOLAND, PND. Does not look fluid overloaded. - Lasix 40 mg IV BID   - f/u Echo   - strict I/O's, daily weights   - Low salt diet    Hypoalbuminemia  Albumin 2.6 on presentation.  - f/u Ur Pr/Cr ratio      Hyponatremia (resolved)  Na 133 on admission. 139 today. - continue to monitor      Troponemia   Likely 2/2 ESRD state, poor clearance. Stable at 0.04.  - trend Troponin     Chronic Anemia   Likely 2/2 iron deficiency, also related to chronic kidney disease.   - f/u Ferritin, Iron/TIBC, soluble transferrin receptor      HTN   - hold home meds as BP borderline      T2DM   BS 91 - recent outpatient visit records show good blood sugar control.  HbA1c 6.8 on 06/17/21.  - continue to monitor      Hx of Opioid Pain Medication Use, Now on Methadone   Patient very sleepy on exam, consider acute intoxication. - attempted to call her methadone clinic to verify dose ([facility]: [phone]) but they did not /VM full   - continue Methadone 140 mg daily   - f/u UDS          Code Status: Full Code   FEN: ADULT DIET; Regular;  Low Sodium (2 gm); 2000 ml  Adult Oral Nutrition Supplement; Standard High Calorie/High Protein Oral Supplement   PPX: Eliquis, protonix  DISPO: Octavia Gill MD, PGY-1  10/02/21  10:18 AM    This patient has been staffed and discussed with Aliza Woods MD.

## 2021-10-02 NOTE — CONSULTS
Clinical Pharmacy Progress Note  Medication History     Admit Date: 10/1/2021    Subjective/Objective:  Asked by Dr. Macrina Sinha to clarify patient's home medications. List of current medications patient is taking is complete. Home medication list in EPIC updated to reflect changes noted below. Source of information: patient, outpatient fill records    Changes made to medication list:   Medication doses adjusted:   · Insulin lispro - prescribed as 8 units TID with meals, but patient takes as sliding scale. She states she takes 2 units when BG >200 and takes an additional 2 units for every incremental increase of 50 mg/dL in her BG. Please note: Patient's methadone clinic is closed on the weekends. Patient states she is still taking 140 mg daily, which was last verified with her clinic in June 2021. Pharmacy will follow-up with her clinic on Monday morning to verify her home dose. Please call with any questions.     Romina Ambrose, [place], [facility]  Wireless: [phone]  10/2/2021 2:22 PM

## 2021-10-02 NOTE — PROGRESS NOTES
4 Eyes Admission Assessment     I agree as the admission nurse that 2 RN's have performed a thorough Head to Toe Skin Assessment on the patient. ALL assessment sites listed below have been assessed on admission. Areas assessed by both nurses:   [x]   Head, Face, and Ears   [x]   Shoulders, Back, and Chest  [x]   Arms, Elbows, and Hands   [x]   Coccyx, Sacrum, and Ischum  [x]   Legs, Feet, and Heels        Does the Patient have Skin Breakdown? Yes a wound was noted on the Admission Assessment and an LDA was Initiated documentation include the Awilda-wound, Wound Assessment, Measurements, Dressing Treatment, Drainage, and Color\",   Unstageable foot ulcers noted to both right and left feet - podiatry consulted. Scattered bruising. Redness to groin and abdominal folds noted.     Ramirez Prevention initiated:  Yes   Wound Care Orders initiated:  Yes      [id number] nurse consulted for Pressure Injury (Stage 3,4, Unstageable, DTI, NWPT, and Complex wounds):  No - podiatry consulted for wounds     Nurse 1 eSignature: Electronically signed by Anabella Cormier RN on 10/2/21 at 1:43 AM EDT    **SHARE this note so that the co-signing nurse is able to place an eSignature*    Nurse 2 eSignature: {Esignature:[id number]}

## 2021-10-02 NOTE — CONSULTS
Clinical Pharmacy Progress Note    Vancomycin - Management by Pharmacy    Consult Date(s): 10/2/21  Consulting Provider(s): Dr. Diego Barrett / Plan    Osteomyelitis of B/L feet - Vancomycin   Concurrent Antimicrobials: Cefepime - day #1   Day of Vanc Therapy: 1   Current Dosing Method: Intermittent   Therapeutic Goal: 15-20 mcg/mL   Current Dose / Frequency: Intermittent dosing   Plan / Rationale:   o Patient has CKD with an estimated vancomycin half-life of ~33 hours. Given renal function, will dose vancomycin intermittently via levels at this time. o Vancomycin loading dose of 2.25g (25 mg/kg) IV x 1 dose ordered for today. Follow-up random level ordered for tomorrow AM.    [facility] Will continue to monitor clinical condition and make adjustments to regimen as appropriate. Thank you for consulting Pharmacy! Margoth NairD, [facility]  Wireless: [phone]  10/2/2021 10:37 AM      Subjective/Objective: Ms. Abraham Garcia is a 60 y.o. female with a PMHx significant for CKD4, DVT (2004) on apixaban, DM2, chronic B/L LE ulcers, HTN, and narcotic-dependent chronic pain on methadone admitted for B/L LE osteomyelitis. Pharmacy has been consulted to dose vancomycin. Height:   Ht Readings from Last 1 Encounters:   10/01/21 5' 2\" (1.575 m)     Weight:   Wt Readings from Last 1 Encounters:   10/01/21 203 lb (92.1 kg)     Level(s) / Doses:    Date Time Dose Level / Type of Level Interpretation                 Note: Serum levels collected for AUC-based dosing may be high if collected in close proximity to the dose administered. This is not necessarily an indicator of toxicity.     Cultures & Sensitivities:    Date Site Micro Susceptibility / Result   10/2 Foot wound Ordered    10/2 Foot wound, fungus Ordered    10/2 Foot wound, AFB Ordered    10/2 Foot wound, anaerobic Ordered    10/2 Foot tissue Ordered      Labs / Ancillary Data:    Estimated Creatinine Clearance: 27 mL/min (A) (based on SCr of 2.4 mg/dL (H)).     Recent Labs     10/01/21  1912 10/01/21  1947 10/02/21  0901   CREATININE 2.5*  --  2.4*   BUN 42*  --  43*   WBC  --  8.2 7.1

## 2021-10-03 NOTE — PROGRESS NOTES
Meropenem 1g IV q12h ordered for patient. This medication is renally eliminated. Will change to meropenem 500 mg IV q12h per renal dose adjustment policy. Estimated Creatinine Clearance: 23 mL/min (A) (based on SCr of 2.9 mg/dL (H)). Pharmacy will continue to monitor renal function and adjust dose as necessary. Please call with any questions. Thanks!   Harleen Conte PharmD, [facility]  Wireless: [phone]  10/3/2021 8:28 AM

## 2021-10-03 NOTE — PROGRESS NOTES
Pharmacy Progress Note    Heparin high dose weight based infusion is ordered for patient. Per chart review, patient has received an oral Factor Xa inhibitor (apixaban) within the last 72 hours. As oral Factor Xa inhibitors can impact the anti-Xa test calibrated to unfractionated heparin, Heparin infusion will be monitored and adjusted using aPTT's per [address]. APTT algorithm:  aPTT < 45         Heparin 80 units/kg bolus     Increase infusion by 4 units/kg/hr                            (maximum 10,000 units)  aPTT 45-59       Heparin 40 units/kg bolus     Increase infusion by 2 units/kg/hr                            (maximum 5,000 units)  aPTT 60-90       No bolus                                No change  aPTT 91-97       No bolus                                Decrease infusion by 1 units/kg/hr  aPTT      No bolus    Decrease infusion by 2 units/kg/hr  aPTT  > 112        Hold heparin for 1 hour       Decrease infusion by 3 units/kg/hr    Obtain aPTT 6 hours after initial bolus and 6 hours after any dose change until two consecutive therapeutic aPTTs are achieved - then daily      Pharmacy will monitor daily to assist with adjustments & ordering of labs as needed. If patient remains on heparin infusion 72 hours from last dose of oral factor Xa inhibitor, pharmacy will change infusion back to usual monitoring via the Anti-Xa algorithm per [address] [facility] [address] as the interaction will no longer occur at that time.     Please call pharmacy with any questions -  Akila Garcia PharmD, [facility]  Wireless: [phone]  10/3/2021 10:56 AM

## 2021-10-03 NOTE — PROGRESS NOTES
Podiatric Surgery Daily Progress Note  Heidy Martínez      Subjective :   Patient seen and examined this am at the bedside. Patient lethargic, difficult to obtain history. Review of Systems: Unable to assess 2/2 clinical condition. Objective     /65   Pulse 65   Temp 97.3 °F (36.3 °C) (Oral)   Resp 18   Ht 5' 2\" (1.575 m)   Wt 221 lb 12.5 oz (100.6 kg)   LMP 10/23/2015   SpO2 94%   BMI 40.56 kg/m²      I/O:    Intake/Output Summary (Last 24 hours) at 10/3/2021 1047  Last data filed at 10/2/2021 1921  Gross per 24 hour   Intake 1207.86 ml   Output 800 ml   Net 407.86 ml              Wt Readings from Last 3 Encounters:   10/03/21 221 lb 12.5 oz (100.6 kg)   08/12/21 200 lb (90.7 kg)   06/18/21 218 lb 0.6 oz (98.9 kg)       LABS:    Recent Labs     10/02/21  0901 10/03/21  0712   WBC 7.1 5.9   HGB 7.8* 7.5*   HCT 24.3* 23.5*    335        Recent Labs     10/03/21  0712      K 4.6      CO2 24   BUN 41*   CREATININE 2.9*        Recent Labs     10/01/21  1947   PROT 6.9           LOWER EXTREMITY EXAMINATION    Dressing to bilateral LE intact. No strikethrough noted to the external dressing. Moderate sanguinous drainage noted to the internal layers of the dressing. VASCULAR: DP and PT pulses nonpalpable bilateral.  Upon hand-held Doppler examination, DP and PT signals weakly biphasic bilateral. CFT is brisk to the distal aspect of the foot bilateral. Skin temperature is warm to cool from proximal to distal with no focal calor noted. +2 pitting edema noted bilateral.  No pain with calf compression b/l.     NEUROLOGIC: Gross and epicritic sensation is diminished b/l. Protective sensation is diminished at all pedal sites b/l.     DERMATOLOGIC:   Right lower extremity:  Full-thickness ulceration noted to the plantar aspect subcuboid and subfifth metatarsal base. Measures 5.5 cm x 4.6 cm x 1.0 cm.   Wound base mixture of granular, fibrotic, and necrotic tissue with bone exposed and with macerated periwound. Wound does not tunnel or track. Scant sanguinous drainage noted. No fluctuance, crepitus, erythema, or malodor noted. Serous bulla noted plantar heel with roof intact. No fluctuance, crepitus, erythema, malodor, or drainage noted. Left lower extremity:  Full-thickness ulceration noted plantar lateral aspect beginning at the base of the fifth digit and extending proximally towards the base of the fifth metatarsal and laterally towards the third metatarsal.  Wound measures 7.4 cm x 5.4 cm x 0.3 cm. Wound base mixture of granular, fibrotic, and necrotic tissue with macerated periwound. Scant serous drainage noted. Wound does not probe to bone, tunnel, or track. No fluctuance, crepitus, erythema, or malodor noted. MUSCULOSKELETAL: Muscle strength is 4/5 for all pedal groups tested. No pain with palpation of the foot or ankle b/l. Ankle joint ROM is decreased in dorsiflexion with the knee extended. 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 IMAGING:  X-ray bilateral foot 10/2/2021  Narrative   Reason: History of diabetes with low leg ulcers, pain       3 views of the right foot       FINDINGS: Patient's had the fifth metatarsal resected. Patient's bones are osteopenic. There is evidence of a soft tissue defect lateral aspect of the right midfoot with increased radiopacity in this region. Soft tissue swelling surrounds the right foot.    Patient prior amputations of the first second third phalanges as well as the fourth metatarsal head.           Impression       Numerous osteotomies with soft tissue swelling and possible ulcer lateral aspect of the right midfoot.  No plain radiographic evidence for osteomyelitis, however plain film findings for osteomyelitis are often late findings.               3 views left foot       FINDINGS:       There is a mottled appearance of the phalanges of the right fifth digit as well as the fifth metatarsal. The remaining metatarsals unremarkable in appearance. Patient's had prior osteotomy of the first distal phalangeal tuft.       IMPRESSION:       Plain radiographic evidence for osteomyelitis involving the fifth phalanges and fifth metatarsal. Patient's bones are osteopenic.          ASSESSMENT/PLAN  -Diabetic foot ulceration, Carpenter 3, right lower extremity  -Diabetic foot ulceration, Carpenter 3, left lower extremity  -PVD, bilateral lower extremity  -Edema, bilateral lower extremity  -Diabetes mellitus type 2 with peripheral neuropathy  -History of noncompliance with weightbearing status     -Patient examined and evaluated at the bedside   -VSS. No leukocytosis noted. -ESR >120, .1  -X-rays reviewed, noted above  -MRI ordered bilateral foot  -Arterial duplex ordered  -Bone biopsy taken right foot, sent for pathology  -Wound culture obtained right foot  -Dressing applied to bilateral lower extremity consisting of Betadine soaked gauze, DSD, Ace bandage  -Continue antibiotics per ID recommendations.  -Non-weightbearing bilateral lower extremity  -Pending MRI results, patient will likely require surgical intervention during this in-house stay pending medical clearance. Medicine team please risk stratify for surgical clearance.     DISPO: Bilateral diabetic foot ulcerations with underlying osteomyelitis. Labs and x-rays reviewed. MRI bilateral foot and arterial duplex ordered. Bone biopsy and wound culture obtained. Patient will likely require surgical intervention during this in-house stay pending MRI results and medical clearance.     Discussed assessment and plan with Dr. Park Gonzalez DPM.    Trent Murray 26  10/03/21  10:47 AM

## 2021-10-03 NOTE — PROGRESS NOTES
Office : [phone]     Fax :[phone]       Nephrology progress  Note      Patient's Name: Mara Jose  9:33 AM  10/3/2021    Reason for Consult:  Francesca Dey on CKD 4       Requesting Physician:  Phyllis Walker MD      Chief Complaint:    Chief Complaint   Patient presents with    Leg Pain     x 2 weeks. History of Present iIlness:    Mara Jose is a 60 y.o. female with past medical h/o CKD4, DVT, T2DM c/b b/l lower extremity ulcers  who presented for progressively worsening b/l lower extremity pain for the 2 weeks. Patient states she presented today with pain in legs so severe that it is now inhibiting her ability to ambulate. She endorses that the legs are also more swollen and warm to touch with pain when she touches it. She endorses she tries to eat a healthy diet low in salt, she also denies any major weight fluctuations recently.     On ROS, denies fever/chills, nausea/vomiting, diarrhea/constipation, urinary issues including dysuria, hematuria, or changes to amount or flow of urine. She denies abdominal pain.      labs. Na 133, BUN 42/Cr 2.5. Pro-BNP elevated 4,733 (baseline 2-3k). Trops 0.04. CBC with Hgb 9.1 Hct 28.5 at baseline. CXR with mild cardiomegaly and interstitial edema. Baseline creat is 2/0   Now elevated at 2.4    Has edema       Interval hx :    creatinine level increased     Edema +    Wound culture growing pseudomonas       I/O last 3 completed shifts: In: 1207.9 [P.O.:240;  I.V.:33.5; IV Piggyback:934.4]  Out: 1300 [Urine:1300]    Past Medical History:   Diagnosis Date    Amenorrhea     Asthma 05/14/2004    Bacterial vaginosis 04/2008    Carpal tunnel syndrome 05/2007    [facility] [address]     Diabetes mellitus type II 2007    10/1/20 pt states does accucheck 2x/day at home    Diabetic neuropathy [facility] [address]     Diastolic CHF ([facility] [address]     DVT (deep venous thrombosis) ([facility] [address] 2004    Dyslipidemia 2009    Dyspareunia 2009    ETOH abuse 2007    Feet clawing     HTN (hypertension)     Hx of blood clots     Hyperlipidemia     MRSA (methicillin resistant staph aureus) culture positive 2017; 2017    foot; leg     Neuropathy 2009    polyneuropathy    Pain, back 2008    Pancreatitis 2004    Pseudocyst, pancreas 2004    Scalp lesion 2007    Tinea pedis     Tobacco abuse 2008    Uses walker     Uses wheelchair     also uses walker    Wears dentures        Past Surgical History:   Procedure Laterality Date     SECTION  unknown    FOOT DEBRIDEMENT Right 2019    INCISION AND DRAINAGE WITH APPLICATION OF STRAVIX GRAFT RIGHT FOOT performed by Shelton Tolbert DPM at [address] Right 2019    RIGHT FOOT INCISION AND DRAINAGE WITH STAGING TRANSMETATARSAL AMPUTATION performed by Shelton Tolbert DPM at [address] Right 2019    RIGHT FOOT DEBRIDEMENT INCISION AND DRAINAGE, OPEN DIABETIC FOOT ULCER WITH GRAFT PLACEMENT performed by Shelton Tolbert DPM at [address] Left 10/23/2019    LEFT FOOT INCISION AND DRAINAGE , DEBRIDEMENT OF OPEN WOUND, APPLICATION OF STRAVIX GRAFT performed by Shelton Tolbert DPM at [address] Right 3/29/2021    INCISION AND DRAINAGE, DEBRIDEMENT OF DIABETIC WOUND WITH PLACEMENT OF STRAVIX GRAFT RIGHT FOOT performed by Shelton Tolbert DPM at [facility] Left     from falling off ladder -- has screws in place pt report    OTHER SURGICAL HISTORY Left 2016    I & D left foot    OTHER SURGICAL HISTORY Right 10/20/2017    RIGHT GASTROC LENGTHENING ENDOSCOPIC, INJECTION OF AMNI GRAFT    OTHER SURGICAL HISTORY Right 2018 5-40 mL, 2 times per day  perflutren lipid microspheres (DEFINITY) injection 1.65 mg, ONCE PRN  influenza quadrivalent split vaccine (FLUZONE;FLUARIX;FLULAVAL;AFLURIA) injection 0.5 mL, Prior to discharge        Review of Systems:   14 point ROS obtained but were negative except mentioned in HPI      Physical exam:     Vitals:  /65   Pulse 65   Temp 97.3 °F (36.3 °C) (Oral)   Resp 18   Ht 5' 2\" (1.575 m)   Wt 221 lb 12.5 oz (100.6 kg)   LMP 10/23/2015   SpO2 94%   BMI 40.56 kg/m²   Constitutional:  OAA X3 NAD  Skin: no rash, turgor wnl  Heent:  eomi, mmm  Neck: no bruits or jvd noted  Cardiovascular:  S1, S2 without m/r/g  Respiratory: b/L base crackles   Abdomen:  +bs, soft, nt, nd  Ext: 1+  lower extremity edema  Psychiatric: mood and affect appropriate  Musculoskeletal:  Rom, muscular strength intact    Labs:  CBC:   Recent Labs     10/01/21  1947 10/02/21  0901 10/03/21  0712   WBC 8.2 7.1 5.9   HGB 7.4* 7.8* 7.5*    300 335     BMP:    Recent Labs     10/01/21  1912 10/02/21  0901 10/03/21  0712   * 139 136   K 4.5 4.1 4.6   CL 99 104 102   CO2 22 24 24   BUN 42* 43* 41*   CREATININE 2.5* 2.4* 2.9*   GLUCOSE 91 110* 269*     Ca/Mg/Phos:   Recent Labs     10/01/21  1911 10/01/21  1912 10/02/21  0901 10/03/21  0712   CALCIUM  --  8.6 8.6 8.2*   MG 1.80  --  1.90 2.00     Hepatic:   Recent Labs     10/01/21  1947   AST 16   ALT 13   BILITOT <0.2   ALKPHOS 131*     Troponin:   Recent Labs     10/01/21  1912 10/02/21  0055 10/02/21  0901   TROPONINI 0.04* 0.04* 0.03*     BNP: No results for input(s): BNP in the last 72 hours. Lipids: No results for input(s): CHOL, TRIG, HDL, LDLCALC, LABVLDL in the last 72 hours. ABGs: No results for input(s): PHART, PO2ART, IJL2SIW in the last 72 hours. INR: No results for input(s): INR in the last 72 hours.   UA:  Recent Labs     10/02/21  0815   COLORU Yellow   CLARITYU SL CLOUDY*   GLUCOSEU 100*   BILIRUBINUR Negative   KETUA Negative   SPECGRAV 1.025   BLOODU TRACE-INTACT*   PHUR 6.0  6.0   PROTEINU 100*   UROBILINOGEN 0.2   NITRU Negative   LEUKOCYTESUR Negative   LABMICR YES   URINETYPE NotGiven      Urine Microscopic:   Recent Labs     10/02/21  0815   BACTERIA 3+*   WBCUA 0-2   RBCUA 0-2   EPIU 2-5     Urine Culture: No results for input(s): LABURIN in the last 72 hours. Urine Chemistry:   Recent Labs     10/02/21  0815   LABCREA 94.9   PROTEINUR 136.00*       IMAGING:  XR FOOT LEFT (MIN 3 VIEWS)   Final Result      Numerous osteotomies with soft tissue swelling and possible ulcer lateral aspect of the right midfoot. No plain radiographic evidence for osteomyelitis, however plain film findings for osteomyelitis are often late findings. 3 views left foot      FINDINGS:      There is a mottled appearance of the phalanges of the right fifth digit as well as the fifth metatarsal. The remaining metatarsals unremarkable in appearance. Patient's had prior osteotomy of the first distal phalangeal tuft. IMPRESSION:      Plain radiographic evidence for osteomyelitis involving the fifth phalanges and fifth metatarsal. Patient's bones are osteopenic. XR FOOT RIGHT (MIN 3 VIEWS)   Final Result      Numerous osteotomies with soft tissue swelling and possible ulcer lateral aspect of the right midfoot. No plain radiographic evidence for osteomyelitis, however plain film findings for osteomyelitis are often late findings. 3 views left foot      FINDINGS:      There is a mottled appearance of the phalanges of the right fifth digit as well as the fifth metatarsal. The remaining metatarsals unremarkable in appearance. Patient's had prior osteotomy of the first distal phalangeal tuft. IMPRESSION:      Plain radiographic evidence for osteomyelitis involving the fifth phalanges and fifth metatarsal. Patient's bones are osteopenic. XR CHEST PORTABLE   Final Result      Mild cardiomegaly and mild interstitial edema.          VL DUP LOWER EXTREMITY ARTERIES BILATERAL    (Results Pending)   MRI FOOT RIGHT WO CONTRAST    (Results Pending)   MRI FOOT LEFT WO CONTRAST    (Results Pending)   MRI FOOT RIGHT WO CONTRAST    (Results Pending)       Assessment/Plan :      1. Lin onc CKD 4   LIN 2/2 multiple factors   Has edema   Lin worsened   Will decrease dose of lasix     2. HTN. BP controlled   Hold cardura     3. Acute on chronic CHF   Continue lasix     4. Dm 2. Needs better control     5. Electrolytes. Monitor and replace.      6. Diabetic foot infection with necrotic ulcer   Renal dose vancomycin   ID following       Recommend to dose adjust all medications  based on renal functions  Maintain SBP> 90 mmHg   Daily weights   AVOID NSAIDs  Avoid Nephrotoxins  Monitor Intake/Output  Call if significant decrease in urine output           Thank you for allowing us to participate in care of Nicholas Moya         Electronically signed by: Shikha Barraza MD, 10/3/2021, 9:33 AM      [facility] [address]  Office : [phone]  Fax :[phone]

## 2021-10-03 NOTE — PROGRESS NOTES
Clinical Pharmacy Progress Note    Vancomycin - Management by Pharmacy    Consult Date(s): 10/2/21  Consulting Provider(s): Dr. Klaudia Welsh / Plan    Osteomyelitis of B/L feet - Vancomycin   Concurrent Antimicrobials:   o Meropenem - day #2  o Fluconazole - day #2   Day of Vanc Therapy: 2   Current Dosing Method: Intermittent   Therapeutic Goal: 15-20 mcg/mL   Current Dose / Frequency: Intermittent dosing   Plan / Rationale:   o Patient has CKD. Will continue to dose intermittently at this time. o Random vancomycin level this AM was 24.5 mcg/mL after loading dose yesterday afternoon. Will hold dose today and obtain a follow-up random level tomorrow AM.   [facility] Will continue to monitor clinical condition and make adjustments to regimen as appropriate. Thank you for consulting Pharmacy! Antonio Zamora, PharmD, [facility]  Wireless: [phone]  10/3/2021 8:26 AM      Interval Update: SCr increased from 2.4->2.9 mg/dL (baseline SCr ~2 mg/dL); UOP 0.5 mL/kg/hr. Patient has been afebrile over the past 24 hrs. Subjective/Objective: Ms. Chitra Mccormick is a 60 y.o. female with a PMHx significant for CKD4, DVT (2004) on apixaban, DM2, chronic B/L LE ulcers, HTN, and narcotic-dependent chronic pain on methadone admitted for B/L LE osteomyelitis. Pharmacy has been consulted to dose vancomycin. Height:   Ht Readings from Last 1 Encounters:   10/01/21 5' 2\" (1.575 m)     Weight:   Wt Readings from Last 1 Encounters:   10/03/21 221 lb 12.5 oz (100.6 kg)     Level(s) / Doses:    Date Time Dose Level / Type of Level Interpretation   10/3 0712 2.25g IV x 1 Random = 24.5 mcg/mL Drawn ~17 hrs after previous dose given. Hold dose. Note: Serum levels collected for AUC-based dosing may be high if collected in close proximity to the dose administered. This is not necessarily an indicator of toxicity.     Cultures & Sensitivities:    Date Site Micro Susceptibility / Result   10/2 Foot wound NGTD    10/2 Foot wound, fungus Ordered    10/2 Foot wound, AFB Ordered    10/2 Foot wound, anaerobic Ordered    10/2 Foot tissue Ordered      Labs / Ancillary Data:    Estimated Creatinine Clearance: 23 mL/min (A) (based on SCr of 2.9 mg/dL (H)).     Recent Labs     10/01/21  1912 10/01/21  1947 10/02/21  0901 10/03/21  0712   CREATININE 2.5*  --  2.4* 2.9*   BUN 42*  --  43* 41*   WBC  --  8.2 7.1 5.9

## 2021-10-03 NOTE — PROGRESS NOTES
Progress Note    Admit Date: 10/1/2021  Day: 2  Diet: ADULT DIET; Regular; Low Sodium (2 gm); 2000 ml  Adult Oral Nutrition Supplement; Standard High Calorie/High Protein Oral Supplement    CC: B/L leg pain for 2-6 weeks    Interval history:   NAEON. No active complaints this am. Afeb, other vital stable. Eliquis held in anticipation of surgery, heparin gtt started. LE dopplers ordered to evaluate further need for [facility]. Echo, LE arterial duplex, MRI feet pending. HPI:   Limmie Bel. Ivonne Msoes is a 59 yo F with PMH CKD4, DVT (2004; on Eqliquis), T2DM c/b b/l lower extremity ulcers (follows with Podiatry outpatient, last visit 9/27/21), HTN, narcotic-dependent chronic pain (on Methadone 140 mg/day) who presented for progressively worsening b/l lower extremity pain for the 2 weeks. Patient states she presented today with pain in legs so severe that it is now inhibiting her ability to ambulate. She endorses that the legs are also more swollen and warm to touch with pain when she touches it. She endorses she tries to eat a healthy diet low in salt, she also denies any major weight fluctuations recently.     On ROS, denies fever/chills, nausea/vomiting, diarrhea/constipation, urinary issues including dysuria, hematuria, or changes to amount or flow of urine. She denies abdominal pain.      On arrival to [facility] ED, VSS. Patient appeared extremely sleepy on physical exam. Chemistry significant for Na 133, BUN 42/Cr 2.5. Pro-BNP elevated 4,733 (baseline 2-3k). Trops 0.04. CBC with Hgb 9.1 Hct 28.5 at baseline. CXR with mild cardiomegaly and interstitial edema.  She was given IV Lasix 40 in the ED and admitted to the floor.        Medications:     Scheduled Meds:   meropenem  500 mg IntraVENous Q12H    furosemide  40 mg IntraVENous BID    vancomycin (VANCOCIN) intermittent dosing (placeholder)   Other See Admin Instructions    fluconazole  200 mg IntraVENous Q24H    amitriptyline  100 mg Oral Nightly    aspirin EC  81 mg Oral Daily There is no abdominal tenderness. There is no guarding. Musculoskeletal:         General: Swelling and tenderness present. Right lower leg: Edema present. Left lower leg: Edema present. Comments: Chronic venous changes on b/l LE, mild tenderness, significant swelling with no obvious wounds on B/L lower legs. B/L feet with wounds, not purulent   Skin:     General: Skin is dry. Capillary Refill: Capillary refill takes less than 2 seconds. Comments: Dryness and flaking of skin on B/L extremity. Significant foot wound bilaterally   Neurological:      General: No focal deficit present. Mental Status: She is alert and oriented to person, place, and time. Sensory: No sensory deficit. Psychiatric:         Mood and Affect: Mood normal.          LABS:    CBC:   Recent Labs     10/01/21  1947 10/02/21  0901 10/03/21  0712   WBC 8.2 7.1 5.9   HGB 7.4* 7.8* 7.5*   HCT 22.7*  22.6* 24.3* 23.5*    300 335   MCV 80.8 80.5 80.2     Renal:    Recent Labs     10/01/21  1911 10/01/21  1912 10/02/21  0901 10/03/21  0712   NA  --  133* 139 136   K  --  4.5 4.1 4.6   CL  --  99 104 102   CO2  --  22 24 24   BUN  --  42* 43* 41*   CREATININE  --  2.5* 2.4* 2.9*   GLUCOSE  --  91 110* 269*   CALCIUM  --  8.6 8.6 8.2*   MG 1.80  --  1.90 2.00   ANIONGAP  --  12 11 10     Hepatic:   Recent Labs     10/01/21  1947   AST 16   ALT 13   BILITOT <0.2   BILIDIR <0.2   PROT 6.9   LABALBU 2.6*   ALKPHOS 131*     Troponin:   Recent Labs     10/01/21  1912 10/02/21  0055 10/02/21  0901   TROPONINI 0.04* 0.04* 0.03*     BNP: No results for input(s): BNP in the last 72 hours. Lipids: No results for input(s): CHOL, HDL in the last 72 hours. Invalid input(s): LDLCALCU, TRIGLYCERIDE  ABGs:  No results for input(s): PHART, [id number], PO2ART, OTF7QVG, BEART, THGBART, A7TIDKQY, KCX0YSD in the last 72 hours. INR: No results for input(s): INR in the last 72 hours.   Lactate: No results for input(s): LACTATE in the last 72 hours. Cultures:  -----------------------------------------------------------------  RAD:   XR FOOT LEFT (MIN 3 VIEWS)   Final Result      Numerous osteotomies with soft tissue swelling and possible ulcer lateral aspect of the right midfoot. No plain radiographic evidence for osteomyelitis, however plain film findings for osteomyelitis are often late findings. 3 views left foot      FINDINGS:      There is a mottled appearance of the phalanges of the right fifth digit as well as the fifth metatarsal. The remaining metatarsals unremarkable in appearance. Patient's had prior osteotomy of the first distal phalangeal tuft. IMPRESSION:      Plain radiographic evidence for osteomyelitis involving the fifth phalanges and fifth metatarsal. Patient's bones are osteopenic. XR FOOT RIGHT (MIN 3 VIEWS)   Final Result      Numerous osteotomies with soft tissue swelling and possible ulcer lateral aspect of the right midfoot. No plain radiographic evidence for osteomyelitis, however plain film findings for osteomyelitis are often late findings. 3 views left foot      FINDINGS:      There is a mottled appearance of the phalanges of the right fifth digit as well as the fifth metatarsal. The remaining metatarsals unremarkable in appearance. Patient's had prior osteotomy of the first distal phalangeal tuft. IMPRESSION:      Plain radiographic evidence for osteomyelitis involving the fifth phalanges and fifth metatarsal. Patient's bones are osteopenic. XR CHEST PORTABLE   Final Result      Mild cardiomegaly and mild interstitial edema. VL DUP LOWER EXTREMITY ARTERIES BILATERAL    (Results Pending)   MRI FOOT RIGHT WO CONTRAST    (Results Pending)   MRI FOOT LEFT WO CONTRAST    (Results Pending)   MRI FOOT RIGHT WO CONTRAST    (Results Pending)   VL Extremity Venous Left    (Results Pending)       Assessment/Plan:      Estelle Villagran is a 61 yo F with PMH CKD4, DVT (on Eqliquis), T2DM c/b b/l lower extremity ulcers (follows with Podiatry outpatient, last visit 9/27/21), HTN, history of pain medication addiction (on Methadone 140 mg/day) who presented for progressively worsening b/l lower extremity pain for the 2 weeks. Osteomyelitis of b/l feet  Recently debrided outpatient on 9/27/21, do not appear acutely infected. Patient on Clinda and Cipro. XR of both feet show osteomyelitis involving 5th phalanges and fifth metatarsal.   - Podiatry consulted - appreciate reccs   - wound care per Podiatry   - F/U wound, bone, fungal and anaerobic cultures  - Antibiotics per ID, vanc, merem, fluconazole    Chronic Systolic Heart Failure  Last Echo 2018 with EF 50-55%. Evidence of PAH which may be contributing. CXR 10/1 with mild cardiomegaly and interstitial edema. Does not report SOB, NOLAND, PND. Does not look fluid overloaded.  -Suspect rt heart failure from undiagnosed sleep apnea. - Lasix 40 mg IV BID   - f/u Echo   - strict I/O's, daily weights   - Low salt diet    FAHEEM on CKD 4   -Baseline creat is appears around 2  Hypoalbuminemia, Albumin 2.6 on presentation.  - Suspect from diabetic nephropathy in setting of poorly controlled diabetes  -HbA1c 10/2021 at 8.3  -Nephrology consulted: appreciate recs     Hyponatremia (resolved)  Na 133 on admission. 136 today. - continue to monitor         Chronic Anemia   Likely 2/2  Anemia of chronic disease from chronic kidney disease.   - Will still check iron studies  -Ferritin Iron/TIBC, soluble transferrin receptor         T2DM   BS 91 - recent outpatient visit records show good blood sugar control. HbA1c 6.8 on 06/17/21.  - -HbA1c on  10/2021 at 8.3  - on wt loss, exercise, good diet and medication compliance     Hx of Opioid Pain Medication Use, Now on Methadone   - continue Methadone 140 mg daily           Code Status: Full Code   FEN: ADULT DIET; Regular;  Low Sodium (2 gm); 2000 ml  Adult Oral Nutrition Supplement; Standard High Calorie/High Protein Oral Supplement   PPX: heparin gtt  DISPO: Zaki Marinelli MD, PGY-2  10/03/21  10:53 AM    This patient has been staffed and discussed with Charles Fraser MD.

## 2021-10-04 NOTE — PLAN OF CARE
Pt is alert and oriented, sleeps very deeply but arousable. Heparin gtt infusing at 22 units/kg/hr at this time (bolus and increased by 4 units/kg/hr for aptt 36). Pt is a very difficult IV stick and has lost 3 IVs in the past 24 hours. NWB to feet per podiatry, VSS. Call light within reach, calls appropriately. Will continue to monitor. Problem: Falls - Risk of:  Goal: Will remain free from falls  Description: Will remain free from falls  Outcome: Ongoing     Problem: Pain:  Goal: Pain level will decrease  Description: Pain level will decrease  Outcome: Ongoing     Problem: Skin Integrity:  Goal: Will show no infection signs and symptoms  Description: Will show no infection signs and symptoms  Outcome: Ongoing   Ace wraps to BLE per podiatry, no strike-through noted this shift.

## 2021-10-04 NOTE — PROGRESS NOTES
Comprehensive Nutrition Assessment    RECOMMENDATIONS:  1. PO Diet: Continue current regular, 2 gm sodium diet with 2000 ml fluid restriction  2. ONS: Continue High Calorie / High Protein Ensure Enlive TID; Start Joshua BID   3. Nutrition Education: RD to provide DM diet edu as appropriate throughout adm. 4. Encourage ONS consumption if meal po intake is less than 50%    NUTRITION ASSESSMENT:   Type and Reason for Visit:  Type and Reason for Visit: Initial, Positive Nutrition Screen, Wound     Nutritional summary & status: Pt with positive nutrition screen for wounds; pt with diabetic foot ulcers. Noted in EMR pt was lethargic this am and it was difficult to obtain hx and have conversation. Upon encounter, nursing made me aware that pt did not want anyone in her room. Pt receiving Ensure Enlive Tid with meals. Noted pt consuming ~75% of meals. RD to order Joshua BID to promote wound healing r/t diabetic ulcers. Will continue to monitor po intakes and ONS tolerance throughout adm. Patient admitted d/t Systolic CHF, acute    PMH significant for: CKD4, DVT, T2DM c/b b/l lower extremity ulcers      MALNUTRITION ASSESSMENT  Context of Malnutrition: Acute Illness   Malnutrition Status: Insufficient data    NUTRITION DIAGNOSIS   · Increased nutrient needs related to increase demand for energy/nutrients (protein) as evidenced by wounds    NUTRITION INTERVENTION  Food and/or Nutrient Delivery:  Continue Current Diet, Modify Oral Nutrition Supplement  Nutrition Education/Counseling:  No recommendation at this time   Goals:  Pt will tolerate and consume >50% of all meals and ONS throuhout adm.        Nutrition Monitoring and Evaluation:   Food/Nutrient Intake Outcomes:  Food and Nutrient Intake, Supplement Intake  Physical Signs/Symptoms Outcomes:  Biochemical Data, Weight     OBJECTIVE DATA: Significant to nutrition assessment  · Nutrition-Focused Physical Findings: Nutrition Related Findings: lbm 10/2, BLE +2 edema · Labs: Reviewed; hgbA1C 8.3 (10/1/21), POCBG 269  · Meds: Reviewed; protonix, lasix  · Wounds: Wound Type: Diabetic Ulcer     CURRENT NUTRITION THERAPIES  ADULT DIET; Regular; Low Sodium (2 gm); 2000 ml  Adult Oral Nutrition Supplement; Standard High Calorie/High Protein Oral Supplement     PO Intake: Average Meal Intake: %   PO Supplement Intake: ERNESTINA  IVF: n/a    ANTHROPOMETRICS  Current Height: 5' 2\" (157.5 cm)  Current Weight: 221 lb 12.5 oz (100.6 kg)    Admission weight: 203 lb (92.1 kg)  Ideal Body Weight (lbs) (Calculated): 110 lbs (Ideal Body Weight (Kg) (Calculated): 50 kg)  Usual Bodyweight: ~200 lbs per EMR  Weight Changes: wt fluctuations r/t CHF       BMI BMI (Calculated): 40.6    Wt Readings from Last 50 Encounters:   10/03/21 221 lb 12.5 oz (100.6 kg)   08/12/21 200 lb (90.7 kg)   06/18/21 218 lb 0.6 oz (98.9 kg)   06/17/21 203 lb (92.1 kg)   04/12/21 203 lb 6.4 oz (92.3 kg)   03/29/21 200 lb (90.7 kg)   03/24/21 204 lb 4.8 oz (92.7 kg)   03/12/21 198 lb 9.6 oz (90.1 kg)   01/07/21 209 lb (94.8 kg)     COMPARATIVE STANDARDS  Energy (kcal):  7004-8870 (12-15); Weight Used for Energy Requirements:  Usual (200 lbs 8/12/21 d/t current edema status )     Protein (g):  63-75 (1.25-1.5); Weight Used for Protein Requirements:  Ideal        Fluid (ml/day):  2000mL; Method Used for Fluid Requirements:   (fluid restriction )      The patient will still be monitored per nutrition standards of care. Consult dietitian if nutrition interventions essential to patient care is needed.      Cookie Loya, [address] [place] [address]  Shane:  [phone]  Office:  [phone]

## 2021-10-04 NOTE — PROGRESS NOTES
Clinical Pharmacy Progress Note    Vancomycin - Management by Pharmacy    Consult Date(s): 10/2/21  Consulting Provider(s): Dr. Sina Vieyra / Plan    Osteomyelitis of B/L feet - Vancomycin   Concurrent Antimicrobials:   o Meropenem - day #3  o Fluconazole - day #3   Day of Vanc Therapy: 3   Current Dosing Method: Intermittent   Therapeutic Goal: 15-20 mcg/mL   Current Dose / Frequency: Intermittent dosing   Plan / Rationale:   o Patient has CKD. Will continue to dose intermittently at this time. o Random vancomycin level this AM was 18.1 mcg/mL after loading dose 10/2.   o Will send vancomycin 500 mg IV x1 dose today with random level ordered for tomorrow AM.  [facility] Will continue to monitor clinical condition and make adjustments to regimen as appropriate. Please call with any questions. Bartholome Sever, PharmD, BCPS  Wireless: [address] [facility] pharmacy: [id number]  10/4/2021 10:10 AM      Interval Update: SCr increased from 2.4->2.9-->3.1 mg/dL (baseline SCr ~2 mg/dL); UOP 0.3 mL/kg/hr. Patient has been afebrile over the past 24 hrs. Subjective/Objective: Ms. Catie Blanco is a 60 y.o. female with a PMHx significant for CKD4, DVT (2004) on apixaban, DM2, chronic B/L LE ulcers, HTN, and narcotic-dependent chronic pain on methadone admitted for B/L LE osteomyelitis. Pharmacy has been consulted to dose vancomycin. Height:   Ht Readings from Last 1 Encounters:   10/01/21 5' 2\" (1.575 m)     Weight:   Wt Readings from Last 1 Encounters:   10/03/21 221 lb 12.5 oz (100.6 kg)     Level(s) / Doses:    Date Time Dose Level / Type of Level Interpretation   10/3 0712 2.25g IV x 1 Random = 24.5 mcg/mL Drawn ~17 hrs after previous dose given. Hold dose.               Cultures & Sensitivities:    Date Site Micro Susceptibility / Result   10/2 Foot wound NGTD    10/2 Foot wound, fungus Ordered    10/2 Foot wound, AFB Ordered    10/2 Foot wound, anaerobic Ordered    10/2 Foot tissue Ordered      Labs / Ancillary Data:    Estimated Creatinine Clearance: 22 mL/min (A) (based on SCr of 3.1 mg/dL (H)).     Recent Labs     10/02/21  0901 10/03/21  0712 10/04/21  0336 10/04/21  0829   CREATININE 2.4* 2.9*  --  3.1*   BUN 43* 41*  --  44*   WBC 7.1 5.9 7.8  --

## 2021-10-04 NOTE — PROGRESS NOTES
Physician Progress Note      Sue Peterson  CSN #:                  [id number]  :                       1965  ADMIT DATE:       10/1/2021 6:16 PM  [address] [place] DATE:  RESPONDING  PROVIDER #:        Ravindra Morocho MD          QUERY TEXT:    Patient admitted with leg swelling, pain. Noted documentation of Acute on   chronic CHF, systolic in H&P and Chronic Systolic Heart Failure in PN 10/. If possible, please document in progress notes and discharge summary if you   are evaluating and /or treating any of the following: The medical record reflects the following:  Risk Factors: 61 yo w/ history of CHF  Clinical Indicators: Per H&P: Acute on chronic CHF, systolic. Per PN 10/:   Chronic Systolic Heart Failure. Pro-BNP: 4733  Treatment: 20 mg IV Lasix X2 daily  Options provided:  -- Acute on chronic CHF, systolic confirmed and Chronic Systolic Heart Failure   ruled out  -- Chronic Systolic Heart Failure confirmed and Acute on chronic CHF, systolic   ruled out  -- Other - I will add my own diagnosis  -- Disagree - Not applicable / Not valid  -- Disagree - Clinically unable to determine / Unknown  -- Refer to Clinical Documentation Reviewer    PROVIDER RESPONSE TEXT:    [facility] WITH ECHOCARDIOGRAM. SO   FAR WE ARE UNABLE TO SAY IF SHE HAS HEART FAILURE.     Query created by: Mik Schmidt on 10/4/2021 8:48 AM      Electronically signed by:  Ravindra Morocho MD 10/4/2021 9:00 AM

## 2021-10-04 NOTE — DISCHARGE SUMMARY
Nephrology assisted with diuresis. Cr increased during her stay and diuretics were held. Given patient had CXR with interstitial edema and LE swelling, echo was repeated on this admission. Patient was not believed to be in acute heart failure exacerbation during her stay. Anemia was noted on labs since admission, workup revealed AOCD, this is likely in the setting of chronic kidney disease. Glucose was controlled on HDSSI and lantus 12 nightly. Of note, patient experienced episodes of drowsiness during her hospital course, she was usually arousable, oriented x3 and would respond to questions appropriately. Patient has high BMI and evidence of PAH on echo. She also experienced episodes of de-sating overnight while sleeping and was sleepy during the day. She was encouraged to get sleep study as an outpatient for possible RASHMI. Podiatry took patient to OR on 10/7 for bilateral lower extremity I&D with left fifth ray resection, right fourth ray resection, right partial cuboid resection. Bone biopsies were sent. Patient had some blood loss during surgery. She required one unit of blood transfusion. She also required a unit prior to surgery for H/H <7. The day after surgery, patient was more hypotensive, some BP meds were held. She required a small fluid bolus and 2 more units of blood following another H/H <7. Adjustments were made to patient's BP meds and another small fluid bolus was given. Heparin was also temporarily held given ongoing bleeding. Surgical path from 10/7 overall showed partially treated osteomyelitis. Patient went back to the OR 10/13 for for repeat I&D with delayed primary closure of right lower extremity and I&D with partial cuboid resection and wound vac application. Patient was hypotensive and difficult to arouse after surgery. Placed on BIPAP with improvement in mental status.  She had another episode of lethargy the day after surgery and de-sated to the mid 80s, VBG showed acute on chronic respiratory acidosis and she was placed back on BIPAP for a few hours. Mental status improved in the late evening and patient was back to baseline the following morning. PT/OT was consulted to see the patient after surgery. She received scores of 9 and 13, respectively. Patient was kept one more day for worsening in FAHEEM (likely 2/2 to hypotension and diuresis for hyperkalemia) and discharged after improvement in FAHEEM with instructions to get a renal function panel in a week and to get sleep study for possible RASHMI. Of note, patient had hyperkalemia during this admission. 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 She was started on Lokelma. EKGs, obtained after significant K lab, were benign. Hyperkalemia was managed appropriately. On the day of discharge, patient denied any CP, SOB, abdominal pain, c/d, urinary symptoms. LE pain following surgery was well controlled. Patient was educated on the importance of tight glucose control to help with wound healing, she expressed understanding. Patient status improved and patient was stable on the day of discharge. Patient was educated on the importance of controlling her diabetes. She was also encouraged to get a sleep study as an outpatient. Physical Exam:  /63   Pulse 68   Temp 97.8 °F (36.6 °C) (Oral)   Resp 18   Ht 5' 2\" (1.575 m)   Wt 228 lb 2.8 oz (103.5 kg) Comment: night nurse did it forgot to place it  LMP 10/23/2015   SpO2 93%   BMI 41.73 kg/m²     ROS - As above    Physical Exam  Constitutional:       General: She is not in acute distress. HENT:      Head: Normocephalic and atraumatic. Nose: No congestion or rhinorrhea. Mouth/Throat:      Mouth: Mucous membranes are moist.      Pharynx: No oropharyngeal exudate or posterior oropharyngeal erythema. Eyes:      General: No scleral icterus. Conjunctiva/sclera: Conjunctivae normal.   Cardiovascular:      Rate and Rhythm: Normal rate and regular rhythm. Heart sounds:  No murmur heard. No gallop. Pulmonary:      Effort: No respiratory distress. Breath sounds: No wheezing or rales. Abdominal:      General: There is no distension. Palpations: Abdomen is soft. Tenderness: There is no abdominal tenderness. There is no guarding. Musculoskeletal:      Cervical back: Neck supple. No tenderness. Right lower leg: Edema present. Left lower leg: Edema present. Skin:     General: Skin is dry. Capillary Refill: Capillary refill takes less than 2 seconds. Neurological:      General: No focal deficit present. Mental Status: She is alert and oriented to person, place, and time. Consults: ID, nephrology and ID  Significant Diagnostic Studies:  x-ray of bilateral foot(feet), MRI of b/l feet, LE dopplers  Treatments: antibiotics: vancomycin, fluconazole and Meropenem  Disposition: SNF  Discharged Condition: Stable  Follow Up: Primary Care Physician in one week    DISCHARGE MEDICATION:     Medication List      START taking these medications    hydrALAZINE 50 MG tablet  Commonly known as: APRESOLINE  Take 1 tablet by mouth every 8 hours     oxyCODONE 5 MG immediate release tablet  Commonly known as: ROXICODONE  Take 1 tablet by mouth every 6 hours for 3 days. Intended supply: 30 days        CHANGE how you take these medications    ammonium lactate 12 % lotion  Commonly known as: Lac-Hydrin  Apply topically daily.   What changed:   · how to take this  · when to take this  · reasons to take this     furosemide 40 MG tablet  Commonly known as: LASIX  Take 1 tablet by mouth daily  What changed:   · medication strength  · how much to take  · when to take this     metoprolol succinate 50 MG extended release tablet  Commonly known as: TOPROL XL  Take 1 tablet by mouth daily  Start taking on: October 17, 2021  What changed:   · medication strength  · how much to take     NovoLOG FlexPen 100 UNIT/ML injection pen  Generic drug: insulin aspart  Inject 8 Units into the skin 3 times daily (before meals)  What changed: additional instructions        CONTINUE taking these medications    albuterol sulfate  (90 Base) MCG/ACT inhaler  Inhale 2 puffs into the lungs every 6 hours as needed for Wheezing     amitriptyline 100 MG tablet  Commonly known as: ELAVIL  TAKE 1 TABLET BY MOUTH EVERY NIGHT AT BEDTIME     aspirin EC 81 MG EC tablet  Take 1 tablet by mouth daily     atorvastatin 20 MG tablet  Commonly known as: Lipitor  Take 1 tablet by mouth nightly     Basaglar KwikPen 100 UNIT/ML injection pen  Generic drug: insulin glargine  Inject 50 Units into the skin daily     Eliquis 5 MG Tabs tablet  Generic drug: apixaban  TAKE 1 TABLET BY MOUTH TWICE DAILY     escitalopram 10 MG tablet  Commonly known as: LEXAPRO  Take 1 tablet by mouth daily     gabapentin 400 MG capsule  Commonly known as: Neurontin  Take 1 capsule by mouth 2 times daily for 90 days. Gauze Pads & Dressings Misc  Please dispense 4x8 guaze, kerlix, and ace     Insulin Pen Needle 31G X 5 MM Misc  1 each by Does not apply route daily     Lancets Misc  1 each by Does not apply route 2 times daily PHARMACY MAY SUBSTITUTE TO TRUE METRIX LANCETS     methadone 10 MG/ML solution  Commonly known as: DOLOPHINE     nystatin 881793 UNIT/GM powder  Commonly known as: MYCOSTATIN  Apply topically 2 times daily Apply to right breast and groin area BID     omeprazole 20 MG delayed release capsule  Commonly known as: PRILOSEC  Take 1 capsule by mouth every morning     True Metrix Blood Glucose Test strip  Generic drug: blood glucose test strips  1 each by In Vitro route 2 times daily As needed.         STOP taking these medications    doxazosin 4 MG tablet  Commonly known as: Cardura           Where to Get Your Medications      You can get these medications from any pharmacy    Bring a paper prescription for each of these medications  · furosemide 40 MG tablet  · hydrALAZINE 50 MG tablet  · metoprolol succinate 50 MG extended release tablet  · oxyCODONE 5 MG immediate release tablet       Activity: activity as directed by podiatry  Diet: cardiac diet and renal diet  Wound Care: keep wound clean and dry and as directed    Signed:  Cynthia Latham MD, PGY-1  10/16/2021     I have personally seen and evaluated this patient. I discussed findings and management with the resident, on 10/16/21  and agree as documented in this note     Feeling better today, no complains  Explained to her that I cannot give methadone and snf needs to coordinate with methadone clinic. We will give 3 days of oxycodone so she can have enough meds for weekend. Recommended we reduce dose of methadone with lethargy on home dose while in hospital. Hgb was 6.9 this am, she had anemia of CKD with underlying CKD4 and 1 unit pRBC was given. She will need IV Abx via tunneled line, cannot get PICC due to CKD. My time spent reviewing discharge plan and follow ups with resident, along with performing independent review of discharge medicines along with counseling the patient exceeds 30 minutes.     Daisha Morales MD  Hospitalist  Attending Physician

## 2021-10-04 NOTE — PLAN OF CARE
Problem: Nutrition  Goal: Optimal nutrition therapy  Outcome: Ongoing     Nutrition Problem #1: Increased nutrient needs  Intervention: Food and/or Nutrient Delivery: Continue Current Diet, Modify Oral Nutrition Supplement  Nutritional Goals: Pt will tolerate and consume >50% of all meals and ONS throuhout adm.

## 2021-10-04 NOTE — PROGRESS NOTES
Physician Progress Note      Jennifer Wynn  CSN #:                  [id number]  :                       1965  ADMIT DATE:       10/1/2021 6:16 PM  [address] [place] DATE:  RESPONDING  PROVIDER #:        Khadijah Morgan MD          QUERY TEXT:    Patient admitted with leg pain and swelling. Noted documentation of CKD 4 by   Nephrology and ESRD in H&P. If possible, please document in progress notes and discharge summary if you   are evaluating and /or treating any of the following: The medical record reflects the following:  Risk Factors: 61 yo w/ history of CHF, DM, leg swelling  Clinical Indicators: Per PN 10/2: Likely 2/2 ESRD. Per Nephrology 10/2: Lin   onc CKD 4. Treatment: Lab monitoring, IV Lasix  Options provided:  -- CKD 4 confirmed and ESRD ruled out  -- ESRD confirmed and CKD 4 ruled out  -- Other - I will add my own diagnosis  -- Disagree - Not applicable / Not valid  -- Disagree - Clinically unable to determine / Unknown  -- Refer to Clinical Documentation Reviewer    PROVIDER RESPONSE TEXT:    After study, CKD 4 confirmed and ESRD ruled out.     Query created by: Sunshine Golden on 10/4/2021 8:56 AM      Electronically signed by:  Khadijah Morgan MD 10/4/2021 11:48 AM

## 2021-10-04 NOTE — PROGRESS NOTES
Podiatric Surgery Daily Progress Note  Nciolás Hernandes      Subjective :   Patient seen and examined this am at the bedside. Patient lethargic, difficult to obtain history. Unable to carry on conversation and fully discuss MRI results at this time with patient. Review of Systems: Unable to assess 2/2 clinical condition. Objective     BP (!) 142/62   Pulse 66   Temp 98 °F (36.7 °C) (Axillary)   Resp 16   Ht 5' 2\" (1.575 m)   Wt 221 lb 12.5 oz (100.6 kg)   LMP 10/23/2015   SpO2 92%   BMI 40.56 kg/m²      I/O:    Intake/Output Summary (Last 24 hours) at 10/4/2021 0625  Last data filed at 10/3/2021 2130  Gross per 24 hour   Intake 200 ml   Output 820 ml   Net -620 ml              Wt Readings from Last 3 Encounters:   10/03/21 221 lb 12.5 oz (100.6 kg)   08/12/21 200 lb (90.7 kg)   06/18/21 218 lb 0.6 oz (98.9 kg)       LABS:    Recent Labs     10/03/21  0712 10/04/21  0336   WBC 5.9 7.8   HGB 7.5* 7.4*   HCT 23.5* 23.3*    353        Recent Labs     10/03/21  0712      K 4.6      CO2 24   BUN 41*   CREATININE 2.9*        Recent Labs     10/01/21  1947 10/03/21  1844 10/04/21  0336   PROT 6.9  --   --    APTT  --  36.0 170.1*           LOWER EXTREMITY EXAMINATION    Dressing to bilateral LE intact. No strikethrough noted to the external dressing. Moderate sanguinous drainage noted to the internal layers of the dressing. VASCULAR: DP and PT pulses nonpalpable bilateral.  Upon hand-held Doppler examination, DP and PT signals weakly biphasic bilateral. CFT is brisk to the distal aspect of the foot bilateral. Skin temperature is warm to cool from proximal to distal with no focal calor noted. +2 pitting edema noted bilateral.  No pain with calf compression b/l.     NEUROLOGIC: Gross and epicritic sensation is diminished b/l.  Protective sensation is diminished at all pedal sites b/l.     DERMATOLOGIC:   Right lower extremity:  Full-thickness ulceration noted to the plantar aspect subcuboid and subfifth metatarsal base. Measures 5.5 cm x 4.6 cm x 1.0 cm. Wound base mixture of granular, fibrotic, and necrotic tissue with bone exposed and with macerated periwound. Wound does not tunnel or track. Scant sanguinous drainage noted. No fluctuance, crepitus, erythema, or malodor noted. Serous bulla noted plantar heel with roof intact. No fluctuance, crepitus, erythema, malodor, or drainage noted. Left lower extremity:  Full-thickness ulceration noted plantar lateral aspect beginning at the base of the fifth digit and extending proximally towards the base of the fifth metatarsal and laterally towards the third metatarsal.  Wound measures 7.4 cm x 5.4 cm x 0.3 cm. Wound base mixture of granular, fibrotic, and necrotic tissue with macerated periwound. Scant serous drainage noted. Wound does not probe to bone, tunnel, or track. No fluctuance, crepitus, erythema, or malodor noted. MUSCULOSKELETAL: Muscle strength is 4/5 for all pedal groups tested. No pain with palpation of the foot or ankle b/l. Ankle joint ROM is decreased in dorsiflexion with the knee extended. 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 IMAGING:  MRI right foot 10/3/2021  Narrative   EXAMINATION: Magnetic resonance imaging (MRI) of the right foot without contrast       HISTORY: Osteomyelitis 5th metatarsal base; rule out OM cuboid       TECHNIQUE: MRI of the right foot was performed using multiple pulse sequences in multiple planes without contrast.       COMPARISON: Radiograph dated 10/2/2021       FINDINGS:        There have been prior first second third MTP joint, fourth transmetatarsal, and fifth TMT joint amputations. There is a soft tissue ulcer lateral to the base of the fourth metatarsal with adjacent T1 hypointensity in marrow edema involving the base of    the fourth metatarsal as well as the lateral aspect of the cuboid representing osteomyelitis.  There is also soft tissue swelling more distally along the plantar aspect of the remaining fourth metatarsal shafts with osteomyelitis along the plantar cortex    of the remaining fourth metatarsal. There is no acute fracture or dislocation. There is a degenerative subchondral cyst in the navicular. No abscess is seen within limits of noncontrast examination. Visualized tendons and plantar fascia are unremarkable.           Impression   Multiple prior amputations. Soft tissue ulceration lateral to the cuboid with mild acute osteomyelitis involving the lateral base of the fourth metatarsal and more distal plantar aspect of the remaining fourth metatarsal shaft as well as the lateral aspect of the cuboid. MRI left foot 10/3/2021  Narrative   EXAMINATION: Magnetic resonance imaging (MRI) of the left foot without contrast       HISTORY: Osteomyelitis 5th metatarsal       TECHNIQUE: MRI of the left foot was performed using multiple pulse sequences in multiple planes without contrast.       COMPARISON: Radiograph dated 10/2/2021       FINDINGS:        There has been prior amputation of the first digit at the level of the base of the first proximal phalanx. There is soft tissue ulceration lateral to the fifth mid metatarsal with surrounding edema and phlegmon in the subcutaneous tissues. There is    extensive destruction of the mid to distal aspect of the fifth metatarsal and base of the fifth proximal phalanx representing sequelae of osteomyelitis. There is also mild osteomyelitis extending to the base of the fifth metatarsal as well as along the    lateral aspect of the cuboid.           Impression   Osteomyelitis involving the fifth metatarsal and fifth proximal phalanx with extensive osseous destruction. Mild osteomyelitis involving the lateral aspect of the cuboid. Prior partial first digit amputation.        X-ray bilateral foot 10/2/2021  Narrative   Reason: History of diabetes with low leg ulcers, pain       3 views of the right foot     FINDINGS: Patient's had the fifth metatarsal resected. Patient's bones are osteopenic. There is evidence of a soft tissue defect lateral aspect of the right midfoot with increased radiopacity in this region. Soft tissue swelling surrounds the right foot.    Patient prior amputations of the first second third phalanges as well as the fourth metatarsal head.           Impression       Numerous osteotomies with soft tissue swelling and possible ulcer lateral aspect of the right midfoot. No plain radiographic evidence for osteomyelitis, however plain film findings for osteomyelitis are often late findings.               3 views left foot       FINDINGS:       There is a mottled appearance of the phalanges of the right fifth digit as well as the fifth metatarsal. The remaining metatarsals unremarkable in appearance. Patient's had prior osteotomy of the first distal phalangeal tuft.       IMPRESSION:       Plain radiographic evidence for osteomyelitis involving the fifth phalanges and fifth metatarsal. Patient's bones are osteopenic.          ASSESSMENT/PLAN  -Diabetic foot ulceration with osteomyelitis, Carpenter 3, right lower extremity  -Diabetic foot ulceration with osteomyelitis, Carpenter 3, left lower extremity  -PVD, bilateral lower extremity  -Edema, bilateral lower extremity  -Diabetes mellitus type 2 with peripheral neuropathy  -History of noncompliance with weightbearing status     -Patient examined and evaluated at the bedside   -Hypertensive, otherwise VSS. No leukocytosis noted.   -ESR >120, .1  -reviewed, noted above  -Arterial duplex ordered  -Bone biopsy taken right foot, sent for pathology  -Wound culture right foot: Pseudomonas aeruginosa, Enterococcus faecalis; sensitivities to follow  -Dressing applied to bilateral lower extremity consisting of Betadine soaked gauze, DSD, Ace bandage  -Continue antibiotics per ID recommendations.  -Non-weightbearing bilateral lower extremity  -Due to patient

## 2021-10-04 NOTE — PROGRESS NOTES
ID Follow-up NOTE    CC:   Diabetic foot ulcer / infection  Antibiotics: Vancomycin, Meropenem    Admit Date: 10/1/2021  Hospital Day: 4    Subjective:     Patient c/o b/l foot pain      Objective:     Patient Vitals for the past 8 hrs:   BP Temp Temp src Pulse Resp SpO2 Weight   10/04/21 1213 (!) 156/70 98.5 °F (36.9 °C) Oral 70 16 94 % 204 lb 9.4 oz (92.8 kg)   10/04/21 0817 (!) 165/56 98.8 °F (37.1 °C) Axillary 74 16 93 %      I/O last 3 completed shifts: In: 200 [P.O.:200]  Out: 820 [Urine:820]  I/O this shift:  In: 180 [P.O.:180]  Out: 450 [Urine:450]    EXAM:  GENERAL: No apparent distress. RA  HEENT: Membranes moist, no oral lesion  NECK:  Supple, no lymphadenopathy  LUNGS: Clear b/l, no rales, no dullness  CARDIAC: RRR, no murmur appreciated  ABD:  Obese, + BS, soft / NT  EXT:  LE venous stasis, b/l foot dressing / ACE in place  NEURO: No focal neurologic findings  PSYCH: Orientation, sensorium, mood normal  LINES:  Peripheral iv       Data Review:  Lab Results   Component Value Date    WBC 7.8 10/04/2021    HGB 7.4 (L) 10/04/2021    HCT 23.3 (L) 10/04/2021    MCV 80.4 10/04/2021     10/04/2021     Lab Results   Component Value Date    CREATININE 3.1 (H) 10/04/2021    BUN 44 (H) 10/04/2021     (L) 10/04/2021    K 5.5 (H) 10/04/2021     10/04/2021    CO2 20 (L) 10/04/2021       Hepatic Function Panel:   Lab Results   Component Value Date    ALKPHOS 131 10/01/2021    ALT 13 10/01/2021    AST 16 10/01/2021    PROT 6.9 10/01/2021    PROT 6.6 07/21/2011    BILITOT <0.2 10/01/2021    BILIDIR <0.2 10/01/2021    IBILI see below 10/01/2021    LABALBU 2.6 10/01/2021       MICRO:  10/2 Wound (not know if L or R): light Ps aeruginosa (R cipro), light 2nd GNR, light Enterococcus sp      IMAGING:  10/3 L foot MRI  Impression   Osteomyelitis involving the fifth metatarsal and fifth proximal phalanx with extensive osseous destruction. Mild osteomyelitis involving the lateral aspect of the cuboid. Prior partial first digit amputation     10/3 R foot MRI   Impression   Multiple prior amputations. Soft tissue ulceration lateral to the cuboid with mild acute osteomyelitis involving the lateral base of the fourth metatarsal and more distal plantar aspect of the remaining fourth metatarsal shaft as well as the lateral aspect of the cuboid.        Scheduled Meds:   insulin glargine  12 Units SubCUTAneous Nightly    insulin lispro  0-12 Units SubCUTAneous TID WC    insulin lispro  0-6 Units SubCUTAneous Nightly    meropenem  500 mg IntraVENous Q12H    [Held by provider] furosemide  20 mg IntraVENous BID    vancomycin (VANCOCIN) intermittent dosing (placeholder)   Other See Admin Instructions    fluconazole  200 mg IntraVENous Q24H    amitriptyline  100 mg Oral Nightly    aspirin EC  81 mg Oral Daily    atorvastatin  20 mg Oral Nightly    [Held by provider] doxazosin  4 mg Oral BID    [Held by provider] apixaban  5 mg Oral BID    escitalopram  10 mg Oral Daily    gabapentin  400 mg Oral BID    methadone  140 mg Oral Daily    [Held by provider] metoprolol succinate  100 mg Oral Daily    pantoprazole  40 mg Oral Daily    sodium chloride flush  5-40 mL IntraVENous 2 times per day    influenza virus vaccine  0.5 mL IntraMUSCular Prior to discharge       Continuous Infusions:   dextrose      heparin (PORCINE) Infusion 19 Units/kg/hr (10/04/21 0548)    sodium chloride Stopped (10/02/21 1729)       PRN Meds:  glucose, dextrose, glucagon (rDNA), dextrose, heparin (porcine), heparin (porcine), albuterol, sodium chloride flush, sodium chloride, ondansetron **OR** ondansetron, polyethylene glycol, acetaminophen **OR** acetaminophen      Assessment:     Obesity (BMI 37), DM, neuropathy, HTN, HL, CKD, chronic pain  Hx DM foot infection / surgeries - has R TMA, L hallux resection      Plan:     Cont vancomycin, meropenem  Will f/u on cult - await 2nd GNR ID / sens, Enterococcus sensitivities  Will review MRI with Radiologist  Will confer with Podiatry    Anticipate need PICC and iv antibiotics after discharge    Medical Decision Making:   The following items were considered in medical decision making:  Discussion of patient care with other providers  Reviewed clinical lab tests  Reviewed radiology tests  Reviewed other diagnostic tests/interventions  Independent review of radiologic images - will review MRI with Radiologist  Microbiology cultures and other micro tests reviewed      Discussed with pt, Discharge Amanda Zhou MD

## 2021-10-04 NOTE — PROGRESS NOTES
Progress Note    Admit Date: 10/1/2021  Day: 3  Diet: ADULT DIET; Regular; Low Sodium (2 gm); 2000 ml  Adult Oral Nutrition Supplement; Standard High Calorie/High Protein Oral Supplement    CC: B/L leg pain for 2-6 weeks    Interval history:   NAONE. Patient seen and examined this am. Remained HDS overnight. Requiring 2L NC. Per nurse, patient was more lethargic earlier in the morning. Patient was alert and awake by the time I saw her. She denies any CP, SOB, abdominal pain, but does endorse was loose stool the day prior. No issues with urination. Tolerating diet. Pain was well controlled today. Down to echo today. HPI:   Daniel Angry. Brigida Luke is a 61 yo F with PMH CKD4, DVT (2004; on Eqliquis), T2DM c/b b/l lower extremity ulcers (follows with Podiatry outpatient, last visit 9/27/21), HTN, narcotic-dependent chronic pain (on Methadone 140 mg/day) who presented for progressively worsening b/l lower extremity pain for the 2 weeks. Patient states she presented today with pain in legs so severe that it is now inhibiting her ability to ambulate. She endorses that the legs are also more swollen and warm to touch with pain when she touches it. She endorses she tries to eat a healthy diet low in salt, she also denies any major weight fluctuations recently.     On ROS, denies fever/chills, nausea/vomiting, diarrhea/constipation, urinary issues including dysuria, hematuria, or changes to amount or flow of urine. She denies abdominal pain.      On arrival to [facility] ED, VSS. Patient appeared extremely sleepy on physical exam. Chemistry significant for Na 133, BUN 42/Cr 2.5. Pro-BNP elevated 4,733 (baseline 2-3k). Trops 0.04. CBC with Hgb 9.1 Hct 28.5 at baseline. CXR with mild cardiomegaly and interstitial edema.  She was given IV Lasix 40 in the ED and admitted to the floor.        Medications:     Scheduled Meds:   meropenem  500 mg IntraVENous Q12H    furosemide  20 mg IntraVENous BID    vancomycin (VANCOCIN) intermittent dosing (placeholder)   Other See Admin Instructions    fluconazole  200 mg IntraVENous Q24H    amitriptyline  100 mg Oral Nightly    aspirin EC  81 mg Oral Daily    atorvastatin  20 mg Oral Nightly    [Held by provider] doxazosin  4 mg Oral BID    [Held by provider] apixaban  5 mg Oral BID    escitalopram  10 mg Oral Daily    gabapentin  400 mg Oral BID    methadone  140 mg Oral Daily    [Held by provider] metoprolol succinate  100 mg Oral Daily    pantoprazole  40 mg Oral Daily    sodium chloride flush  5-40 mL IntraVENous 2 times per day    influenza virus vaccine  0.5 mL IntraMUSCular Prior to discharge     Continuous Infusions:   heparin (PORCINE) Infusion 19 Units/kg/hr (10/04/21 0548)    sodium chloride Stopped (10/02/21 1729)     PRN Meds:heparin (porcine), heparin (porcine), albuterol, sodium chloride flush, sodium chloride, ondansetron **OR** ondansetron, polyethylene glycol, acetaminophen **OR** acetaminophen, perflutren lipid microspheres    Objective:   Vitals:   T-max:  Patient Vitals for the past 8 hrs:   BP Temp Temp src Pulse Resp SpO2   10/04/21 0817 (!) 165/56 98.8 °F (37.1 °C) Axillary 74 16 93 %   10/04/21 0501 (!) 142/62 98 °F (36.7 °C) Axillary 66 16 92 %       Intake/Output Summary (Last 24 hours) at 10/4/2021 0913  Last data filed at 10/3/2021 2130  Gross per 24 hour   Intake 200 ml   Output 820 ml   Net -620 ml         Physical Exam  Physical Exam  Constitutional:       General: She is not in acute distress. HENT:      Head: Normocephalic and atraumatic. Nose: No congestion or rhinorrhea. Mouth/Throat:      Mouth: Mucous membranes are moist.      Pharynx: No oropharyngeal exudate or posterior oropharyngeal erythema. Eyes:      General: No scleral icterus. Conjunctiva/sclera: Conjunctivae normal.   Cardiovascular:      Rate and Rhythm: Normal rate and regular rhythm. Heart sounds: No murmur heard. No gallop. Pulmonary:      Effort: No respiratory distress. Breath sounds: No wheezing or rales. Abdominal:      General: There is no distension. Palpations: Abdomen is soft. Tenderness: There is no abdominal tenderness. There is no guarding. Musculoskeletal:      Cervical back: Neck supple. No tenderness. Right lower leg: Edema present. Left lower leg: Edema present. Comments: Chronic venous changes on b/l LE, mild tenderness, significant swelling with no obvious wounds on B/L lower legs. B/L feet with wounds, not purulent    Skin:     General: Skin is dry. Capillary Refill: Capillary refill takes less than 2 seconds. Neurological:      General: No focal deficit present. Mental Status: She is alert and oriented to person, place, and time. LABS:    CBC:   Recent Labs     10/02/21  0901 10/03/21  0712 10/04/21  0336   WBC 7.1 5.9 7.8   HGB 7.8* 7.5* 7.4*   HCT 24.3* 23.5* 23.3*    335 353   MCV 80.5 80.2 80.4     Renal:    Recent Labs     10/01/21  1911 10/01/21  1912 10/02/21  0901 10/03/21  0712   NA  --  133* 139 136   K  --  4.5 4.1 4.6   CL  --  99 104 102   CO2  --  22 24 24   BUN  --  42* 43* 41*   CREATININE  --  2.5* 2.4* 2.9*   GLUCOSE  --  91 110* 269*   CALCIUM  --  8.6 8.6 8.2*   MG 1.80  --  1.90 2.00   ANIONGAP  --  12 11 10     Hepatic:   Recent Labs     10/01/21  1947   AST 16   ALT 13   BILITOT <0.2   BILIDIR <0.2   PROT 6.9   LABALBU 2.6*   ALKPHOS 131*     Troponin:   Recent Labs     10/01/21  1912 10/02/21  0055 10/02/21  0901   TROPONINI 0.04* 0.04* 0.03*     BNP: No results for input(s): BNP in the last 72 hours. Lipids: No results for input(s): CHOL, HDL in the last 72 hours. Invalid input(s): LDLCALCU, TRIGLYCERIDE  ABGs:  No results for input(s): PHART, DRG4TAB, PO2ART, OGX6YYN, BEART, THGBART, L7KCUDZZ, MNA3LEF in the last 72 hours. INR: No results for input(s): INR in the last 72 hours.   Lactate: No results for input(s): LACTATE in the last 72 hours.  Cultures:  -----------------------------------------------------------------  RAD:   MRI FOOT LEFT WO CONTRAST   Final Result   Osteomyelitis involving the fifth metatarsal and fifth proximal phalanx with extensive osseous destruction. Mild osteomyelitis involving the lateral aspect of the cuboid. Prior partial first digit amputation. MRI FOOT RIGHT WO CONTRAST   Final Result   Multiple prior amputations. Soft tissue ulceration lateral to the cuboid with mild acute osteomyelitis involving the lateral base of the fourth metatarsal and more distal plantar aspect of the remaining fourth metatarsal shaft as well as the lateral aspect of the cuboid. XR FOOT LEFT (MIN 3 VIEWS)   Final Result      Numerous osteotomies with soft tissue swelling and possible ulcer lateral aspect of the right midfoot. No plain radiographic evidence for osteomyelitis, however plain film findings for osteomyelitis are often late findings. 3 views left foot      FINDINGS:      There is a mottled appearance of the phalanges of the right fifth digit as well as the fifth metatarsal. The remaining metatarsals unremarkable in appearance. Patient's had prior osteotomy of the first distal phalangeal tuft. IMPRESSION:      Plain radiographic evidence for osteomyelitis involving the fifth phalanges and fifth metatarsal. Patient's bones are osteopenic. XR FOOT RIGHT (MIN 3 VIEWS)   Final Result      Numerous osteotomies with soft tissue swelling and possible ulcer lateral aspect of the right midfoot. No plain radiographic evidence for osteomyelitis, however plain film findings for osteomyelitis are often late findings. 3 views left foot      FINDINGS:      There is a mottled appearance of the phalanges of the right fifth digit as well as the fifth metatarsal. The remaining metatarsals unremarkable in appearance. Patient's had prior osteotomy of the first distal phalangeal tuft.       IMPRESSION: Plain radiographic evidence for osteomyelitis involving the fifth phalanges and fifth metatarsal. Patient's bones are osteopenic. XR CHEST PORTABLE   Final Result      Mild cardiomegaly and mild interstitial edema. VL DUP LOWER EXTREMITY ARTERIES BILATERAL    (Results Pending)   VL Extremity Venous Bilateral    (Results Pending)       Assessment/Plan:      Janes DouglasSherry Hoyt is a 61 yo F with PMH CKD4, DVT (on Eqliquis), T2DM c/b b/l lower extremity ulcers (follows with Podiatry outpatient, last visit 9/27/21), HTN, history of pain medication addiction (on Methadone 140 mg/day) who presented for progressively worsening b/l lower extremity pain for the 2 weeks. Osteomyelitis of b/l feet  Recently debrided outpatient on 9/27/21, do not appear acutely infected. Patient on Clinda and Cipro. XR of both feet show osteomyelitis involving 5th phalanges and fifth metatarsal.   - Podiatry following  - wound care per Podiatry  - Wound cultures grew pseudomonas and enterococcus  - Antibiotics per ID, vanc, merem, fluconazole    Chronic diastolic Heart Failure  Last Echo 2018 with EF 50-55%. Evidence of PAH which may be contributing. CXR 10/1 with mild cardiomegaly and interstitial edema. Suspect rt heart failure from undiagnosed sleep apnea. - Lasix 20 mg IV BID held per nephro for  Increase Cr  - Echo pending  - strict I/O's, daily weights   - Low salt diet    FAHEEM on CKD 4   Baseline creat is appears around 2. Hypoalbuminemia, Albumin 2.6 on presentation. Suspect from diabetic nephropathy in setting of poorly controlled diabetes. HbA1c 10/2021 at 8.3  - Monitor Cr closely during diuresis, holding diuretics for now  - Monitor I/Os  - Avoid nephrotoxic agents  - Nephrology following     Hyponatremia (resolved)  Na 133 on admission.  - continue to monitor      Anemia of Chronic Anemia   Likely 2/2  Anemia of chronic disease from chronic kidney disease.  Iron 16, iron sat 9, transferrin 7.8, TIBC 174  - Monitor daily CBC, stable     T2DM   BS 91 - recent outpatient visit records show good blood sugar control. HbA1c 6.8 on 06/17/21. - HbA1c on  10/2021 at 8.3  - on wt loss, exercise, good diet and medication compliance     Hx of Opioid Pain Medication Use, Now on Methadone   - Continue Methadone 140 mg daily         Code Status: Full Code   FEN: ADULT DIET; Regular;  Low Sodium (2 gm); 2000 ml  Adult Oral Nutrition Supplement; Standard High Calorie/High Protein Oral Supplement   PPX: heparin gtt  DISPO: Marivel Molina MD, PGY-1  10/04/21  9:13 AM    This patient has been staffed and discussed with Tammy Tran MD.

## 2021-10-04 NOTE — CARE COORDINATION
must be sent along with the prescription(s)  4. Cost of Rx cannot be added to hospital bill. If financial assistance is needed, please contact unit  or ;  or  CANNOT provide pharmacy voucher for patients co-pays  5. Patients can then  the prescription on their way out of the hospital at discharge, or pharmacy can deliver to the bedside if staff is available. (payment due at time of pick-up or delivery - cash, check, or card accepted)     Able to afford home medications/ co-pay costs: Yes    ADLS  Support Systems: Family Members    PT AM-PAC:   /24  OT AM-PAC:   /24    [place]: home with 2 adult children  Steps: 6 steps into home    Plans to RETURN to current housing: Yes  Barriers to RETURNING to current housing: safety concerns    [address]  Currently ACTIVE with [facility] [place] [address]: No  Home Care Agency: Not Applicable    Currently ACTIVE with [place] [facility]: No  Passport/ Waiver: No  Passport/ Waiver Services:       [facility] [address] [place]  DME Provider: unknown  Equipment: walker    Home Oxygen and [address] prior to admission: No  [address]: Not Applicable  Other Respiratory Equipment: none      HD Center:  Not Applicable    DISCHARGE PLAN:  Disposition: [place] (SNF): TBD Phone: TBD Fax: TBD; will make requested  referrals  If agreeable   Transportation PLAN for discharge: EMS transportation     Factors facilitating achievement of predicted outcomes: Family support    Barriers to discharge: Stairs at home, podiatry clearance, potential non-weight bearing    Additional Case Management Notes: CM met with pt to complete initial assessment in discharge planning. Pt lives at home with 2 adult children who both work. Pt presented as lethargic throughout assessment, nodding off at times and not able to answer all questions. Pt uses a walker; unsure of other current DME.   No current services; unsure of past services/companies. Per Dr. Wanda Cross (at bedside), pt will need to have surgery on both feet, PICC line, IV with antibiotics and will be non-weight bearing. Pt will likely need SNF placement (pending PT/OT recommendations). The Plan for Transition of Care is related to the following treatment goals of Systolic CHF, acute ([facility] [address] [I50.21]  Acute on chronic congestive heart failure, unspecified heart failure type ([address] [I50.9]    The Patient and/or patient representative [employer] and her family were provided with a choice of provider and agrees with the discharge plan Yes    Freedom of choice list was provided with basic dialogue that supports the patient's individualized plan of care/goals and shares the quality data associated with the providers.  Yes    Care Transition patient: No    Rhys Garrett RN  [facility]  Case Management Department  Ph: [phone]

## 2021-10-04 NOTE — PLAN OF CARE
Problem: Falls - Risk of:  Goal: Will remain free from falls  Description: Will remain free from falls  10/4/2021 1508 by Merline Galeazzi, RN  Outcome: Ongoing  Note: Patient at risk for falls. Patient resting quietly in bed. Side rails up x 2/4. Bed locked in lowest position. Bed alarm on. Bedside table and call light within reach. Patient instructed to call for assistance. Patient verbalized understanding. Will continue to monitor. Problem: Skin Integrity:  Goal: Will show no infection signs and symptoms  Description: Will show no infection signs and symptoms  10/4/2021 1508 by Merline Galeazzi, RN  Outcome: Ongoing  Note: Pt has bilat foot ulcers. Feet wrapped by podiatry. On IV antibiotics for osteo in bilat feet.

## 2021-10-05 NOTE — PROGRESS NOTES
tobramycin Sensitive <=4 mcg/mL     Escherichia coli   Antibiotic Interpretation ISAAC   amoxicillin-clavulanate Intermediate 16/8 mcg/mL   ampicillin Resistant >16 mcg/mL   ceFAZolin Resistant >16 mcg/mL   cefepime Sensitive <=2 mcg/mL   cefTRIAXone Sensitive <=1 mcg/mL   cefuroxime Sensitive 8 mcg/mL   ciprofloxacin Resistant >2 mcg/mL   ertapenem Sensitive <=0.5 mcg/mL   gentamicin Sensitive <=4 mcg/mL   meropenem Sensitive <=1 mcg/mL   piperacillin-tazobactam Sensitive <=16 mcg/mL   trimethoprim-sulfamethoxazole Sensitive <=2/38 mcg/mL     Enterococcus  faecalis   Antibiotic Interpretation ISAAC   ampicillin Sensitive <=2 mcg/mL   vancomycin Sensitive 2 mcg/mL       IMAGING:  10/3 L foot MRI  Impression   Osteomyelitis involving the fifth metatarsal and fifth proximal phalanx with extensive osseous destruction. Mild osteomyelitis involving the lateral aspect of the cuboid. Prior partial first digit amputation     10/3 R foot MRI   Impression   Multiple prior amputations. Soft tissue ulceration lateral to the cuboid with mild acute osteomyelitis involving the lateral base of the fourth metatarsal and more distal plantar aspect of the remaining fourth metatarsal shaft as well as the lateral aspect of the cuboid.        Scheduled Meds:   insulin glargine  12 Units SubCUTAneous Nightly    insulin lispro  0-18 Units SubCUTAneous TID WC    insulin lispro  0-9 Units SubCUTAneous Nightly    meropenem  500 mg IntraVENous Q12H    [Held by provider] furosemide  20 mg IntraVENous BID    vancomycin (VANCOCIN) intermittent dosing (placeholder)   Other See Admin Instructions    fluconazole  200 mg IntraVENous Q24H    amitriptyline  100 mg Oral Nightly    aspirin EC  81 mg Oral Daily    atorvastatin  20 mg Oral Nightly    [Held by provider] doxazosin  4 mg Oral BID    [Held by provider] apixaban  5 mg Oral BID    escitalopram  10 mg Oral Daily    gabapentin  400 mg Oral BID    methadone  140 mg Oral Daily    [Held by provider] metoprolol succinate  100 mg Oral Daily    pantoprazole  40 mg Oral Daily    sodium chloride flush  5-40 mL IntraVENous 2 times per day    influenza virus vaccine  0.5 mL IntraMUSCular Prior to discharge       Continuous Infusions:   dextrose      sodium chloride Stopped (10/02/21 1729)       PRN Meds:  glucose, dextrose, glucagon (rDNA), dextrose, heparin (porcine), heparin (porcine), albuterol, sodium chloride flush, sodium chloride, ondansetron **OR** ondansetron, polyethylene glycol, acetaminophen **OR** acetaminophen      Assessment:     Obesity (BMI 37), DM, neuropathy, HTN, HL, CKD, chronic pain  Hx DM foot infection / surgeries - has R TMA, L hallux resection    L foot wound infection / osteomyelitis   - MRI with 5th MT + prox 5th phalanx destruction, lateral cuboid edema, reviewed images with Radiologist 10/4    R foot wound infection / osteomyelitis    - MRI with cuboid, 4th MT OM, reviewed images with Radiologist 10/4    Cult polymicrobial with Ps aerug (R cipro), E coli, E faecalis    Plan:     Cont vancomycin, meropenem    Wound care and surgical intervention per Podiatry, surgical planing in process    Anticipate need PICC and iv antibiotics after discharge      Medical Decision Making:   The following items were considered in medical decision making:  Discussion of patient care with other providers  Reviewed clinical lab tests  Reviewed radiology tests  Reviewed other diagnostic tests/interventions  Independent review of radiologic images - will review MRI with Radiologist  Microbiology cultures and other micro tests reviewed      Discussed with pt  Discussed with Podiatry Resident on 10/4  Roxanne Laird MD

## 2021-10-05 NOTE — PLAN OF CARE
Problem: Falls - Risk of:  Goal: Will remain free from falls  Description: Will remain free from falls  Outcome: Ongoing  Note: Patient at risk for falls. Patient resting quietly in bed. Side rails up x 3/4. Bed locked in lowest position. Bed alarm on. Bedside table and call light within reach. Patient instructed to call for assistance. Patient verbalized understanding. Will continue to monitor. Problem: OXYGENATION/RESPIRATORY FUNCTION  Goal: Patient will maintain patent airway  Outcome: Ongoing  Note: Pt on 2L NC d/t sleep apnea. Lungs clear. Generalized edema noted. Lasix on hold d/t FAHEEM.

## 2021-10-05 NOTE — PROGRESS NOTES
Podiatric Surgery Daily Progress Note  Shayne Jiménez      Subjective :   Patient seen and examined this am at the bedside. Patient much more alert today. Denies any increase in pain to bilateral lower extremity since admission. Reports she feels better today. Review of Systems: Denies fever, chills, nausea, vomiting, shortness of breath, chest pain. Objective     /60   Pulse 65   Temp 97.8 °F (36.6 °C) (Axillary)   Resp 16   Ht 5' 2\" (1.575 m)   Wt 205 lb 0.4 oz (93 kg)   LMP 10/23/2015   SpO2 94%   BMI 37.50 kg/m²      I/O:    Intake/Output Summary (Last 24 hours) at 10/5/2021 1037  Last data filed at 10/5/2021 0353  Gross per 24 hour   Intake 1050.79 ml   Output 900 ml   Net 150.79 ml              Wt Readings from Last 3 Encounters:   10/05/21 205 lb 0.4 oz (93 kg)   08/12/21 200 lb (90.7 kg)   06/18/21 218 lb 0.6 oz (98.9 kg)       LABS:    Recent Labs     10/04/21  0336 10/05/21  0307   WBC 7.8 7.7   HGB 7.4* 7.9*   HCT 23.3* 24.9*    391        Recent Labs     10/05/21  0307   *   K 5.1      CO2 25   BUN 45*   CREATININE 3.0*        Recent Labs     10/04/21  1858 10/05/21  0307   APTT 92.3* 100.8*           LOWER EXTREMITY EXAMINATION    Dressing to bilateral LE intact. No strikethrough noted to the external dressing. Mild sanguinous drainage noted to the internal layers of the dressing. VASCULAR: DP and PT pulses nonpalpable bilateral.  Upon hand-held Doppler examination, DP and PT signals weakly biphasic bilateral. CFT is brisk to the distal aspect of the foot bilateral. Skin temperature is warm to cool from proximal to distal with no focal calor noted. +2 pitting edema noted bilateral.  No pain with calf compression b/l.     NEUROLOGIC: Gross and epicritic sensation is diminished b/l.  Protective sensation is diminished at all pedal sites b/l.     DERMATOLOGIC:   Right lower extremity:  Full-thickness ulceration noted to the plantar aspect subcuboid and subfifth metatarsal base. Measures 5.5 cm x 4.6 cm x 1.0 cm. Wound base mixture of granular, fibrotic, and necrotic tissue with bone exposed and with macerated periwound. Wound does not tunnel or track. Scant sanguinous drainage noted. No fluctuance, crepitus, erythema, or malodor noted. Serous bulla noted plantar heel with roof intact. No fluctuance, crepitus, erythema, malodor, or drainage noted. Left lower extremity:  Full-thickness ulceration noted plantar lateral aspect beginning at the base of the fifth digit and extending proximally towards the base of the fifth metatarsal and laterally towards the third metatarsal.  Wound measures 7.4 cm x 5.4 cm x 0.3 cm. Wound base mixture of granular, fibrotic, and necrotic tissue with macerated periwound. Scant serous drainage noted. Wound does not probe to bone, tunnel, or track. No fluctuance, crepitus, erythema, or malodor noted. MUSCULOSKELETAL: Muscle strength is 4/5 for all pedal groups tested. No pain with palpation of the foot or ankle b/l. Ankle joint ROM is decreased in dorsiflexion with the knee extended. 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 IMAGING:  MRI right foot 10/3/2021  Narrative   EXAMINATION: Magnetic resonance imaging (MRI) of the right foot without contrast       HISTORY: Osteomyelitis 5th metatarsal base; rule out OM cuboid       TECHNIQUE: MRI of the right foot was performed using multiple pulse sequences in multiple planes without contrast.       COMPARISON: Radiograph dated 10/2/2021       FINDINGS:        There have been prior first second third MTP joint, fourth transmetatarsal, and fifth TMT joint amputations. There is a soft tissue ulcer lateral to the base of the fourth metatarsal with adjacent T1 hypointensity in marrow edema involving the base of    the fourth metatarsal as well as the lateral aspect of the cuboid representing osteomyelitis.  There is also soft tissue swelling more distally along the plantar aspect of the remaining fourth metatarsal shafts with osteomyelitis along the plantar cortex    of the remaining fourth metatarsal. There is no acute fracture or dislocation. There is a degenerative subchondral cyst in the navicular. No abscess is seen within limits of noncontrast examination. Visualized tendons and plantar fascia are unremarkable.           Impression   Multiple prior amputations. Soft tissue ulceration lateral to the cuboid with mild acute osteomyelitis involving the lateral base of the fourth metatarsal and more distal plantar aspect of the remaining fourth metatarsal shaft as well as the lateral aspect of the cuboid. MRI left foot 10/3/2021  Narrative   EXAMINATION: Magnetic resonance imaging (MRI) of the left foot without contrast       HISTORY: Osteomyelitis 5th metatarsal       TECHNIQUE: MRI of the left foot was performed using multiple pulse sequences in multiple planes without contrast.       COMPARISON: Radiograph dated 10/2/2021       FINDINGS:        There has been prior amputation of the first digit at the level of the base of the first proximal phalanx. There is soft tissue ulceration lateral to the fifth mid metatarsal with surrounding edema and phlegmon in the subcutaneous tissues. There is    extensive destruction of the mid to distal aspect of the fifth metatarsal and base of the fifth proximal phalanx representing sequelae of osteomyelitis. There is also mild osteomyelitis extending to the base of the fifth metatarsal as well as along the    lateral aspect of the cuboid.           Impression   Osteomyelitis involving the fifth metatarsal and fifth proximal phalanx with extensive osseous destruction. Mild osteomyelitis involving the lateral aspect of the cuboid. Prior partial first digit amputation. Lower extremity arterial duplex 10/4/2021-preliminary  Right   Right CRISTIAN was not available due to patient non-compliance .    There are multiphasic waveforms in the common femoral artery indicating no   aortoiliac inflow disease. There is atherosclerotic plaque involving the superficial femoral and   popliteal arteries with no significantly elevated velocities. Left   Left CRISTIAN was not available due to patient non-compliance . There are multiphasic waveforms in the common femoral artery indicating no   aortoiliac inflow disease. There is atherosclerotic plaque involving the superficial femoral and   popliteal arteries with no significantly elevated velocities. The peroneal artery is not visualized. There is no previous exam for comparison. X-ray bilateral foot 10/2/2021  Narrative   Reason: History of diabetes with low leg ulcers, pain       3 views of the right foot       FINDINGS: Patient's had the fifth metatarsal resected. Patient's bones are osteopenic. There is evidence of a soft tissue defect lateral aspect of the right midfoot with increased radiopacity in this region. Soft tissue swelling surrounds the right foot.    Patient prior amputations of the first second third phalanges as well as the fourth metatarsal head.           Impression       Numerous osteotomies with soft tissue swelling and possible ulcer lateral aspect of the right midfoot. No plain radiographic evidence for osteomyelitis, however plain film findings for osteomyelitis are often late findings.               3 views left foot       FINDINGS:       There is a mottled appearance of the phalanges of the right fifth digit as well as the fifth metatarsal. The remaining metatarsals unremarkable in appearance.  Patient's had prior osteotomy of the first distal phalangeal tuft.       IMPRESSION:       Plain radiographic evidence for osteomyelitis involving the fifth phalanges and fifth metatarsal. Patient's bones are osteopenic.          ASSESSMENT/PLAN  -Diabetic foot ulceration with osteomyelitis, Carpenter 3, right lower extremity  -Diabetic foot ulceration with osteomyelitis, Nathaneil Dyana 3, left lower extremity  -PVD, bilateral lower extremity  -Edema, bilateral lower extremity  -Diabetes mellitus type 2 with peripheral neuropathy  -History of noncompliance with weightbearing status     -Patient examined and evaluated at the bedside   -VSS. No leukocytosis noted. -ESR >120, .1  -Imaging reviewed, noted above  -Bone biopsy taken right foot, sent for pathology  -Wound culture right foot: Pseudomonas aeruginosa, E coli, Enterococcus faecalis; sensitivities to follow  -Dressing applied to bilateral lower extremity consisting of Betadine soaked gauze, DSD, Ace bandage  -Continue antibiotics per ID recommendations.  -Non-weightbearing bilateral lower extremity  -Lengthy discussion with patient regarding treatment options including but not limited to more proximal amputation at this time. Patient is refusing more proximal amputation at this time. Discussed that only performing the right 4th ray with partial cuboid resection and left 5th ray with partial cuboid resection will likely still leave her with an open wound bilateral, that these procedures may still leave infected bone and will not be definitive, and that ambulation may be limited by these specific procedures; Patient understands but refuses proximal amputation.  -Patient will require surgical intervention during this in-house stay pending medical clearance. Planned for tomorrow afternoon - Bilateral lower extremity incision and drainage, with fourth ray resection right foot, fifth ray resection left foot, partial cuboid resection bilateral foot. Medicine team please risk stratify for surgical clearance.  -Discussed surgical intervention with patient at bedside. All potential risks, benefits, and complications were discussed with the patient prior to the scheduling of surgery. No guarantees or promises were made to what the outcomes will be.  The patient wished to proceed with surgery, and informed written consent was obtained,

## 2021-10-05 NOTE — PROGRESS NOTES
Clinical Pharmacy Progress Note    Vancomycin - Management by Pharmacy    Consult Date(s): 10/2/21  Consulting Provider(s): Dr. Leland Blood / Plan    Osteomyelitis of B/L feet - Vancomycin   Concurrent Antimicrobials:   o Meropenem - day #4  o Fluconazole - day #4   Day of Vanc Therapy: 4   Current Dosing Method: Intermittent   Therapeutic Goal: 15-20 mcg/mL   Current Dose / Frequency: Intermittent dosing   Plan / Rationale:   o Patient has CKD. Will continue to dose intermittently at this time. o Random vancomycin level this AM was 18.8 mcg/mL after 500 mg given yesterday  o Will order vancomycin 500 mg IV x1 dose today with random level ordered for tomorrow AM.  o NOTE: wound growing E. Faecalis with vancomycin ISAAC=2, E. Coli, and    Will continue to monitor clinical condition and make adjustments to regimen as appropriate. Please call with questions:  [phone] ([facility])    Mat BAEZ BCPS    10/5/2021 1:51 PM          Interval Update:  Wound culture growing E Faecalis, E. Coli and Pseudomonas. SCr increased from 2.4->2.9-->3.1 -> 3.0 mg/dL (baseline SCr ~2 mg/dL); UOP 0.5mL/kg/hr. Patient has been afebrile over the past 24 hrs. Tunneled cath placed today per IR. Subjective/Objective: Ms. Segun Leon is a 60 y.o. female with a PMHx significant for CKD4, DVT (2004) on apixaban, DM2, chronic B/L LE ulcers, HTN, and narcotic-dependent chronic pain on methadone admitted for B/L LE osteomyelitis. Pharmacy has been consulted to dose vancomycin. Height:   Ht Readings from Last 1 Encounters:   10/01/21 5' 2\" (1.575 m)     Weight:   Wt Readings from Last 1 Encounters:   10/05/21 205 lb 0.4 oz (93 kg)     Level(s) / Doses:    Date Time Dose Level / Type of Level Interpretation   10/2 1401 2250 mg IV x1     10/3 0712   Random = 24.5 mcg/mL Drawn ~17 hrs after previous dose given. Hold dose.    10/4 0829  Random =18.1 mcg/mL  Re-dose 500 mg IV x1    1231 500 mg IV x1     10/5 0307 Random = 18.8 mcg/mL Drawn 14.5h after dose  Will re-dose 500 mg IV x1       Cultures & Sensitivities:    Date Site Micro Susceptibility / Result   10/2 Foot wound  Pseudomonas aeruginosa     S: cefepime, gent, meropenem, Zosyn, tobramycin  R: Cipro     Foot wound E. Coli  S: Cefepime, ceftriaxone, cefuroxime, ertapenem, gent, meropenem, Zosyn, Bactrim  R: Cipro    Foot wound  Enterococcus faecalis S: Ampicillin, Vancomycin                   Labs / Ancillary Data:    Estimated Creatinine Clearance: 22 mL/min (A) (based on SCr of 3 mg/dL (H)).     Recent Labs     10/03/21  0712 10/04/21  0336 10/04/21  0829 10/05/21  0307   CREATININE 2.9*  --  3.1* 3.0*   BUN 41*  --  44* 45*   WBC 5.9 7.8  --  7.7

## 2021-10-05 NOTE — PRE SEDATION
Sedation Pre-Procedure Note    Patient Name: Skip Casarez   YOB: 1965  Room/Bed: 8141/4074-11  Medical Record Number: [id number]  Date: 10/5/2021   Time: 2:17 PM       Indication:  Diabetic foot ulcer, chronic kidney disease. Needs tunneled central line. Consent: I have discussed with the patient and/or the patient representative the indication, alternatives, and the possible risks and/or complications of the planned procedure and the anesthesia methods. The patient and/or patient representative appear to understand and agree to proceed. Vital Signs:   Vitals:    10/05/21 1143   BP: (!) 142/72   Pulse: 72   Resp: 16   Temp: 98 °F (36.7 °C)   SpO2: 92%       Past Medical History:   has a past medical history of Asthma, Bacterial vaginosis, Carpal tunnel syndrome, COPD (chronic obstructive pulmonary disease) ([facility] [address] Diabetes mellitus type II, Diabetic neuropathy (HCC), Diastolic CHF ([facility] [address] DVT (deep venous thrombosis) ([facility] [address] Dyslipidemia, Dyspareunia, ETOH abuse, Feet clawing, HTN (hypertension), Hx of blood clots, Hyperlipidemia, MRSA (methicillin resistant staph aureus) culture positive, Neuropathy, Pancreatitis, Scalp lesion, Tobacco abuse, Uses walker, Uses wheelchair, and Wears dentures. Past Surgical History:   has a past surgical history that includes  section (unknown); pre-malignant / benign skin lesion excision (7003); other surgical history (Left, 2016); Toe amputation (Left, 2017); other surgical history (Right, 10/20/2017); other surgical history (Right, 2018); pr debridement, skin, sub-q tissue,muscle,bone,=<20 sq cm (Right, 2018); Foot Debridement (Right, 2019); Foot Debridement (Right, 2019); Foot Debridement (Right, 2019); Foot Debridement (Left, 10/23/2019); Tonsillectomy; knee surgery (Left); and Foot Debridement (Right, 3/29/2021).     Medications:   Scheduled Meds:    vancomycin  500 mg IntraVENous Once    insulin glargine  12 Units SubCUTAneous Nightly    insulin lispro  0-18 Units SubCUTAneous TID WC    insulin lispro  0-9 Units SubCUTAneous Nightly    meropenem  500 mg IntraVENous Q12H    [Held by provider] furosemide  20 mg IntraVENous BID    vancomycin (VANCOCIN) intermittent dosing (placeholder)   Other See Admin Instructions    fluconazole  200 mg IntraVENous Q24H    amitriptyline  100 mg Oral Nightly    aspirin EC  81 mg Oral Daily    atorvastatin  20 mg Oral Nightly    [Held by provider] doxazosin  4 mg Oral BID    [Held by provider] apixaban  5 mg Oral BID    escitalopram  10 mg Oral Daily    gabapentin  400 mg Oral BID    methadone  140 mg Oral Daily    [Held by provider] metoprolol succinate  100 mg Oral Daily    pantoprazole  40 mg Oral Daily    sodium chloride flush  5-40 mL IntraVENous 2 times per day    influenza virus vaccine  0.5 mL IntraMUSCular Prior to discharge     Continuous Infusions:    dextrose      sodium chloride Stopped (10/02/21 7179)     PRN Meds: glucose, dextrose, glucagon (rDNA), dextrose, heparin (porcine), heparin (porcine), albuterol, sodium chloride flush, sodium chloride, ondansetron **OR** ondansetron, polyethylene glycol, acetaminophen **OR** acetaminophen  Home Meds:   Prior to Admission medications    Medication Sig Start Date End Date Taking? Authorizing Provider   gabapentin (NEURONTIN) 400 MG capsule Take 1 capsule by mouth 2 times daily for 90 days.  9/24/21 12/23/21 Yes Timoteo Call MD   doxazosin (CARDURA) 4 MG tablet Take 1 tablet by mouth 2 times daily 8/11/21  Yes Timoteo Call MD   ELIQUIS 5 MG TABS tablet TAKE 1 TABLET BY MOUTH TWICE DAILY 8/11/21  Yes Timoteo Call MD   escitalopram (LEXAPRO) 10 MG tablet Take 1 tablet by mouth daily 7/29/21  Yes Rich Nunez MD   omeprazole (PRILOSEC) 20 MG delayed release capsule Take 1 capsule by mouth every morning 7/26/21  Yes Timoteo Call MD   furosemide (LASIX) 20 MG tablet Take 1 tablet by mouth 2 times daily 21  Yes Morris Raymond MD   methadone (DOLOPHINE) 10 MG/ML solution Take 140 mg by mouth daily. Verified dose with [facility] ([phone]) 21   Yes Historical Provider, MD   amitriptyline (ELAVIL) 100 MG tablet TAKE 1 TABLET BY MOUTH EVERY NIGHT AT BEDTIME 6/15/21  Yes Brian Garcia MD   metoprolol succinate (TOPROL XL) 100 MG extended release tablet Take 1 tablet by mouth daily 21  Yes Macarena Pichardo MD   atorvastatin (LIPITOR) 20 MG tablet Take 1 tablet by mouth nightly 21  Yes Morris Raymond MD   ammonium lactate (LAC-HYDRIN) 12 % lotion Apply topically daily. Patient taking differently: Apply topically as needed Apply topically daily. 3/24/21  Yes Nakia Liu MD   insulin glargine [facility]) 100 UNIT/ML injection pen Inject 50 Units into the skin daily 20  Yes David Acuña MD   insulin aspart (NOVOLOG FLEXPEN) 100 UNIT/ML injection pen Inject 8 Units into the skin 3 times daily (before meals)  Patient taking differently: Inject 8 Units into the skin 3 times daily (before meals) Uses as sliding scale per B-250 = 2 units  251-300 = 4 units  301-350 = 6 units  351-400 = 8 units  >400 = 10 units 20  Yes Emilia Garrett MD   nystatin (MYCOSTATIN) 875211 UNIT/GM powder Apply topically 2 times daily Apply to right breast and groin area BID 20  Yes Deborah Croft MD   aspirin EC 81 MG EC tablet Take 1 tablet by mouth daily 20  Yes Deborah Croft MD   albuterol sulfate  (90 Base) MCG/ACT inhaler Inhale 2 puffs into the lungs every 6 hours as needed for Wheezing  Patient taking differently: Inhale 2 puffs into the lungs every 6 hours as needed for Wheezing  20  Yes Yuval Aguilar MD   blood glucose test strips (TRUE METRIX BLOOD GLUCOSE TEST) strip 1 each by In Vitro route 2 times daily As needed.  20  Deborah Croft MD   Insulin Pen Needle 31G X 5 MM MISC 1 each by Does not apply route daily 20 Jesu Santamaria MD   Lancets MISC 1 each by Does not apply route 2 times daily PHARMACY MAY SUBSTITUTE TO TRUE METRIX LANCETS 9/18/20   Jesu Santamaria MD   Gauze Pads & Dressings MISC Please dispense 4x8 guaze, kerlix, and ace 2/23/18   Germaine Pea, DPM     Coumadin Use Last 7 Days:  no  Antiplatelet drug therapy use last 7 days: no  Other anticoagulant use last 7 days: no  Additional Medication Information:  -      Pre-Sedation Documentation and Exam:   I have personally completed a history, physical exam & review of systems for this patient (see notes). Vital signs have been reviewed (see flow sheet for vitals).     Mallampati Airway Assessment:  Mallampati Class II - (soft palate, fauces & uvula are visible)    Prior History of Anesthesia Complications:   none    ASA Classification:  Class 2 - A normal healthy patient with mild systemic disease    Sedation/ Anesthesia Plan:   intravenous sedation    Medications Planned:   midazolam (Versed) intravenously and fentanyl intravenously    Patient is an appropriate candidate for plan of sedation: yes    Electronically signed by Ellie Duffy MD on 10/5/2021 at 2:17 PM

## 2021-10-05 NOTE — PROGRESS NOTES
Pre-Operative Note  Resident Note     Patient: Norman Barnes     Procedure: Bilateral lower extremity incision and drainage, with fourth ray resection right foot, fifth ray resection left foot, partial cuboid resection bilateral foot    Clearance for surgery: per Internal Medicine    Consent: Procedure was discussed at length with patient and all questions were answered to completion, consent obtained and placed in chart     Labs:        Recent Labs     10/06/21  0849 10/07/21  0520   WBC 5.3 6.2   HGB 7.2* 6.9*   HCT 22.9* 21.3*   MCV 81.4 80.8    348        Recent Labs     10/06/21  0849 10/07/21  0520    140   K 4.9 4.6    105   CO2 27 27   BUN 38* 37*   CREATININE 2.3* 1.9*      No results for input(s): AST, ALT, ALB, BILIDIR, BILITOT, ALKPHOS in the last 72 hours. No results for input(s): LIPASE, AMYLASE in the last 72 hours. Recent Labs     10/05/21  0307 10/05/21  1154 10/05/21  2139   INR  --  0.98  --    APTT 100.8*  --  61.3*      No results for input(s): CKTOTAL, CKMB, CKMBINDEX, TROPONINI in the last 72 hours.     Pre-op Medications:   Current Facility-Administered Medications   Medication Dose Route Frequency Provider Last Rate Last Admin    0.9 % sodium chloride infusion   IntraVENous PRN Yobany Lawson MD        furosemide (LASIX) tablet 20 mg  20 mg Oral Daily Gabriella Centeno MD   20 mg at 10/06/21 1033    hydrALAZINE (APRESOLINE) injection 5 mg  5 mg IntraVENous Q6H PRN Shanta Webb MD   5 mg at 10/06/21 1343    meropenem (MERREM) 1,000 mg in sodium chloride 0.9 % 100 mL IVPB (mini-bag)  1,000 mg IntraVENous Q12H Campbell Buenrostro MD   Stopped at 10/07/21 0627    glucose (GLUTOSE) 40 % oral gel 15 g  15 g Oral PRN Shanta Webb MD        dextrose 50 % IV solution  12.5 g IntraVENous PRN Shanta Webb MD        glucagon (rDNA) injection 1 mg  1 mg IntraMUSCular PRN Shanta Webb MD        dextrose 5 % solution  100 mL/hr IntraVENous PRN Brandy Downing Lottie Ramirez MD        insulin glargine (LANTUS;BASAGLAR) injection pen 12 Units  12 Units SubCUTAneous Nightly Tacho Caceres MD   12 Units at 10/06/21 2234    insulin lispro (1 Unit Dial) 0-18 Units  0-18 Units SubCUTAneous TID WC Tacho Caceres MD   3 Units at 10/06/21 1740    insulin lispro (1 Unit Dial) 0-9 Units  0-9 Units SubCUTAneous Nightly Tacho Caceres MD   3 Units at 10/06/21 0011    heparin (porcine) injection 8,050 Units  80 Units/kg IntraVENous PRN Tacho Caceres MD   8,050 Units at 10/03/21 2106    heparin (porcine) injection 4,020 Units  40 Units/kg IntraVENous PRN Tacho Caceres MD        vancomycin [facility]) intermittent dosing (placeholder)   Other See Admin Instructions Rosi Barber MD        fluconazole (DIFLUCAN) 200 mg IVPB  200 mg IntraVENous Q24H Allen Donohue  mL/hr at 10/06/21 1740 200 mg at 10/06/21 1740    albuterol (PROVENTIL) nebulizer solution 2.5 mg  2.5 mg Nebulization Q6H PRN Gretta Sauceda MD        amitriptyline (ELAVIL) tablet 100 mg  100 mg Oral Nightly Gretta Sauceda MD   100 mg at 10/06/21 2123    aspirin EC tablet 81 mg  81 mg Oral Daily Gretta Sauceda MD   81 mg at 10/06/21 0958    atorvastatin (LIPITOR) tablet 20 mg  20 mg Oral Nightly Gretta Sauceda MD   20 mg at 10/06/21 2124    doxazosin (CARDURA) tablet 4 mg  4 mg Oral BID Gretta Sauceda MD   4 mg at 10/06/21 2123    [Held by provider] apixaban (ELIQUIS) tablet 5 mg  5 mg Oral BID Gretta Sauceda MD   5 mg at 10/02/21 2133    escitalopram (LEXAPRO) tablet 10 mg  10 mg Oral Daily Gretta Sauceda MD   10 mg at 10/06/21 0958    gabapentin (NEURONTIN) capsule 400 mg  400 mg Oral BID Gretta Sauceda MD   400 mg at 10/06/21 2123    methadone (DOLOPHINE) tablet 140 mg  140 mg Oral Daily Gretta Sauceda MD   140 mg at 10/06/21 0958    metoprolol succinate (TOPROL XL) extended release tablet 100 mg  100 mg Oral Daily Gretta Sauceda MD   100 mg at 10/06/21 1021  pantoprazole (PROTONIX) tablet 40 mg  40 mg Oral Daily Carlton Gordon MD   40 mg at 10/06/21 5513    sodium chloride flush 0.9 % injection 5-40 mL  5-40 mL IntraVENous PRN Carlton Gordon MD        0.9 % sodium chloride infusion  25 mL IntraVENous PRN Carlton Gordon  mL/hr at 10/07/21 0517 25 mL at 10/07/21 0517    ondansetron (ZOFRAN-ODT) disintegrating tablet 4 mg  4 mg Oral Q8H PRN Carlton Gordon MD        Or    ondansetron (ZOFRAN) injection 4 mg  4 mg IntraVENous Q6H PRN Carlton Gordon MD        polyethylene glycol (GLYCOLAX) packet 17 g  17 g Oral Daily PRN Carlton Gordon MD        acetaminophen (TYLENOL) tablet 650 mg  650 mg Oral Q6H PRN Carlton Gordon MD   650 mg at 10/06/21 1244    Or    acetaminophen (TYLENOL) suppository 650 mg  650 mg Rectal Q6H PRN Carlton Gordon MD        sodium chloride flush 0.9 % injection 5-40 mL  5-40 mL IntraVENous 2 times per day Carlton Gordon MD   10 mL at 10/06/21 2126    influenza quadrivalent split vaccine (FLUZONE;FLUARIX;FLULAVAL;AFLURIA) injection 0.5 mL  0.5 mL IntraMUSCular Prior to discharge Shawn Azar MD           Diet: Diet NPO Exceptions are: Sips of Water with Meds    CXR: Mild cardiomegaly and mild interstitial edema    EKG: Please see cardiology section of EMR    Echocardiogram: Please see cardiology section of EMR    IV access/ saline lock: Yes    PT/INR: 11.1/0.98    Type and Screen: O Pos, Antibody Pos    Pregnancy test: Negative    COVID-19: Vaccinated    Known risk factors for perioperative complications: Congestive heart failure  Diabetes mellitus  Renal dysfunction      Anesthesia to see patient.     Petty Garrett DPM  10/07/21  8:05 AM

## 2021-10-05 NOTE — PROCEDURES
IR Procedure Note    Requested procedure: tunneled central line placement    Procedure: Successful placement of tunneled central line via right IJ access. Tip at cavoatrial junction. Ready for use.     Anesthesia: local lidocaine    Surgeons/Assistants: hCeo Cooper    Estimated Blood Loss: Minimal    Complications: None    Kalli Yung MD MD  10/5/2021

## 2021-10-05 NOTE — CARE COORDINATION
Case Management Assessment           Daily Note                 Date/ Time of Note: 10/5/2021 10:29 AM         Note completed by: Julián Sun RN    Patient Name: Trina Cleveland  YOB: 1965    Diagnosis:Systolic CHF, acute ([facility] [I50.21]  Acute on chronic congestive heart failure, unspecified heart failure type ([facility] [I50.9]  Patient Admission Status: Inpatient    Date of Admission:10/1/2021  6:16 PM Length of Stay: 4 GLOS: GMLOS: 8.3    Current Plan of Care:   Nephro / ID following :      Per MD Documentation:  Pseudomonas and enterococcus on wound cx  - will need iv abx for osteo on dc. ID and podiatry help appreciated.   - D/w nephrology Dr Branden Darby and pt cannot have a picc placed 2/2 ckd stage 4- recommends tunneled catheter- will consult IR for placement  Per Podiatry:   Bone biopsy and wound culture obtained  Patient will likely require surgical intervention during this in-house stay pending OR availability and medical clearance  ________________________________________________________________________________________  PT AM-PAC:   / 24 per last evaluation on: pending  Ordered  10/4  Will follow up  POST OP for  Dispo planning rec:      OT AM-PAC:   / 24 per last evaluation on:   pending  Ordered  10/4    DME Needs for discharge: TBD  Vs  Defer to  SNF    ________________________________________________________________________________________  Discharge Plan: Home with [address] [place] [address] Way: VS  SNF      Tentative discharge date: tbd     Current barriers to discharge: IV atbx  ,  Cx pending :  placment   Medical  Podiatry clearance     Referrals completed: Not Applicable  [facility] following /      Resources/ information provided: Not indicated at this time  ________________________________________________________________________________________  Case Management Notes:    Patient will likely need  SNF   D/t  Long term IV atbx  needed and  Will be  NWB  :  CM will assist  With  SNF  List  And make referral  Based on preference and choice       Shanti Gordon and her family were provided with choice of provider; she and her family are in agreement with the discharge plan.     Care Transition Patient: Lianna Castillo RN  [facility]  Case Management Department  Ph: [phone]

## 2021-10-05 NOTE — PROGRESS NOTES
Had discussion with podiatry team about surgery tomorrow. RCRI showed Class IV Risk, 15% 30-day risk of death, MI, or cardiac arrest. Benefits of surgical intervention outweigh risk, okay to proceed with surgical intervention with general anesthesia. Will place NPO/hold heparin ggt at midnight.

## 2021-10-05 NOTE — PROGRESS NOTES
Progress Note    Admit Date: 10/1/2021  Day: 4  Diet: Diet NPO Exceptions are: Sips of Water with Meds    CC: B/L leg pain for 2-6 weeks    Interval history:   NAONE. Patient seen and examined this am. She was afebrile, hemodynamically stable on 2L NC. She was reporting LE pain, which was controlled. Denied any CP, SOB, abdominal pain, c/d, urinary symptoms. Scheduled for tunneled catheter today. Fill go for surgery tomorrow with podiatry. HPI:   Melody Meckel. Gregor Fernandez is a 59 yo F with PMH CKD4, DVT (2004; on Eqliquis), T2DM c/b b/l lower extremity ulcers (follows with Podiatry outpatient, last visit 9/27/21), HTN, narcotic-dependent chronic pain (on Methadone 140 mg/day) who presented for progressively worsening b/l lower extremity pain for the 2 weeks. Patient states she presented today with pain in legs so severe that it is now inhibiting her ability to ambulate. She endorses that the legs are also more swollen and warm to touch with pain when she touches it. She endorses she tries to eat a healthy diet low in salt, she also denies any major weight fluctuations recently.     On ROS, denies fever/chills, nausea/vomiting, diarrhea/constipation, urinary issues including dysuria, hematuria, or changes to amount or flow of urine. She denies abdominal pain.      On arrival to [facility] ED, VSS. Patient appeared extremely sleepy on physical exam. Chemistry significant for Na 133, BUN 42/Cr 2.5. Pro-BNP elevated 4,733 (baseline 2-3k). Trops 0.04. CBC with Hgb 9.1 Hct 28.5 at baseline. CXR with mild cardiomegaly and interstitial edema.  She was given IV Lasix 40 in the ED and admitted to the floor.        Medications:     Scheduled Meds:   insulin glargine  12 Units SubCUTAneous Nightly    insulin lispro  0-18 Units SubCUTAneous TID WC    insulin lispro  0-9 Units SubCUTAneous Nightly    meropenem  500 mg IntraVENous Q12H    [Held by provider] furosemide  20 mg IntraVENous BID    vancomycin (VANCOCIN) intermittent dosing (placeholder)   Other See Admin Instructions    fluconazole  200 mg IntraVENous Q24H    amitriptyline  100 mg Oral Nightly    aspirin EC  81 mg Oral Daily    atorvastatin  20 mg Oral Nightly    [Held by provider] doxazosin  4 mg Oral BID    [Held by provider] apixaban  5 mg Oral BID    escitalopram  10 mg Oral Daily    gabapentin  400 mg Oral BID    methadone  140 mg Oral Daily    [Held by provider] metoprolol succinate  100 mg Oral Daily    pantoprazole  40 mg Oral Daily    sodium chloride flush  5-40 mL IntraVENous 2 times per day    influenza virus vaccine  0.5 mL IntraMUSCular Prior to discharge     Continuous Infusions:   dextrose      sodium chloride Stopped (10/02/21 1729)     PRN Meds:glucose, dextrose, glucagon (rDNA), dextrose, heparin (porcine), heparin (porcine), albuterol, sodium chloride flush, sodium chloride, ondansetron **OR** ondansetron, polyethylene glycol, acetaminophen **OR** acetaminophen    Objective:   Vitals:   T-max:  Patient Vitals for the past 8 hrs:   BP Temp Temp src Pulse Resp SpO2 Weight   10/05/21 0736 107/60 97.8 °F (36.6 °C) Axillary 65 16 94 %    10/05/21 0357       205 lb 0.4 oz (93 kg)   10/05/21 0345 (!) 167/68 98.2 °F (36.8 °C) Oral 74 16 99 %        Intake/Output Summary (Last 24 hours) at 10/5/2021 1028  Last data filed at 10/5/2021 0353  Gross per 24 hour   Intake 1050.79 ml   Output 900 ml   Net 150.79 ml     ROS as above    Physical Exam  Physical Exam  Constitutional:       General: She is not in acute distress. HENT:      Head: Normocephalic and atraumatic. Nose: No congestion or rhinorrhea. Mouth/Throat:      Mouth: Mucous membranes are moist.      Pharynx: No oropharyngeal exudate or posterior oropharyngeal erythema. Eyes:      General: No scleral icterus. Conjunctiva/sclera: Conjunctivae normal.   Cardiovascular:      Rate and Rhythm: Normal rate and regular rhythm. Heart sounds: No murmur heard. No gallop. Pulmonary:      Effort: No respiratory distress. Breath sounds: No wheezing or rales. Abdominal:      General: There is no distension. Palpations: Abdomen is soft. Tenderness: There is no abdominal tenderness. There is no guarding. Musculoskeletal:      Cervical back: Neck supple. No tenderness. Right lower leg: Edema present. Left lower leg: Edema present. Comments: Chronic venous changes on b/l LE, mild tenderness, significant swelling. B/L feet with wounds, not purulent    Skin:     General: Skin is dry. Capillary Refill: Capillary refill takes less than 2 seconds. Neurological:      General: No focal deficit present. Mental Status: She is alert and oriented to person, place, and time. LABS:    CBC:   Recent Labs     10/03/21  0712 10/04/21  0336 10/05/21  0307   WBC 5.9 7.8 7.7   HGB 7.5* 7.4* 7.9*   HCT 23.5* 23.3* 24.9*    353 391   MCV 80.2 80.4 81.4     Renal:    Recent Labs     10/03/21  0712 10/03/21  0712 10/04/21  0829 10/04/21  1220 10/05/21  0307     --  134*  --  134*   K 4.6   < > 5.5* 5.5* 5.1     --  103  --  100   CO2 24  --  20*  --  25   BUN 41*  --  44*  --  45*   CREATININE 2.9*  --  3.1*  --  3.0*   GLUCOSE 269*  --  354*  --  157*   CALCIUM 8.2*  --  8.3  --  8.7   MG 2.00  --  2.00  --  2.00   ANIONGAP 10  --  11  --  9    < > = values in this interval not displayed. Hepatic:   No results for input(s): AST, ALT, BILITOT, BILIDIR, PROT, LABALBU, ALKPHOS in the last 72 hours. Troponin:   No results for input(s): TROPONINI in the last 72 hours. BNP: No results for input(s): BNP in the last 72 hours. Lipids: No results for input(s): CHOL, HDL in the last 72 hours. Invalid input(s): LDLCALCU, TRIGLYCERIDE  ABGs:  No results for input(s): PHART, SGN8ZFE, PO2ART, YKP3VOQ, BEART, THGBART, N0SOADRS, OXH8ZYR in the last 72 hours. INR: No results for input(s): INR in the last 72 hours.   Lactate: No results for input(s): LACTATE in the last 72 hours. Cultures:  -----------------------------------------------------------------  RAD:   VL DUP LOWER EXTREMITY ARTERIES BILATERAL         MRI FOOT LEFT WO CONTRAST   Final Result   Osteomyelitis involving the fifth metatarsal and fifth proximal phalanx with extensive osseous destruction. Mild osteomyelitis involving the lateral aspect of the cuboid. Prior partial first digit amputation. MRI FOOT RIGHT WO CONTRAST   Final Result   Multiple prior amputations. Soft tissue ulceration lateral to the cuboid with mild acute osteomyelitis involving the lateral base of the fourth metatarsal and more distal plantar aspect of the remaining fourth metatarsal shaft as well as the lateral aspect of the cuboid. XR FOOT LEFT (MIN 3 VIEWS)   Final Result      Numerous osteotomies with soft tissue swelling and possible ulcer lateral aspect of the right midfoot. No plain radiographic evidence for osteomyelitis, however plain film findings for osteomyelitis are often late findings. 3 views left foot      FINDINGS:      There is a mottled appearance of the phalanges of the right fifth digit as well as the fifth metatarsal. The remaining metatarsals unremarkable in appearance. Patient's had prior osteotomy of the first distal phalangeal tuft. IMPRESSION:      Plain radiographic evidence for osteomyelitis involving the fifth phalanges and fifth metatarsal. Patient's bones are osteopenic. XR FOOT RIGHT (MIN 3 VIEWS)   Final Result      Numerous osteotomies with soft tissue swelling and possible ulcer lateral aspect of the right midfoot. No plain radiographic evidence for osteomyelitis, however plain film findings for osteomyelitis are often late findings. 3 views left foot      FINDINGS:      There is a mottled appearance of the phalanges of the right fifth digit as well as the fifth metatarsal. The remaining metatarsals unremarkable in appearance. Patient's had prior osteotomy of the first distal phalangeal tuft. IMPRESSION:      Plain radiographic evidence for osteomyelitis involving the fifth phalanges and fifth metatarsal. Patient's bones are osteopenic. XR CHEST PORTABLE   Final Result      Mild cardiomegaly and mild interstitial edema. VL Extremity Venous Bilateral    (Results Pending)       Assessment/Plan:      Yandy Hernandez. Salena Saenz is a 61 yo F with PMH CKD4, DVT (on Eqliquis), T2DM c/b b/l lower extremity ulcers (follows with Podiatry outpatient, last visit 9/27/21), HTN, history of pain medication addiction (on Methadone 140 mg/day) who presented for progressively worsening b/l lower extremity pain for the 2 weeks. Pre-operative evaluation  Plan to take patient down for bilateral LE I&D, R foot 4th ray resection and L foot 5th ray resection tomorrow with podiatry. Patient denies any SOB, CP, orthopnea or PND. She has no JVD or crackles on exam.   - RCRI showed Class IV Risk, 15% 30-day risk of death, MI, or cardiac arrest.   - Will place NPO and hold heparin ggt at midnight    Osteomyelitis of b/l feet  Recently debrided outpatient on 9/27/21, do not appear acutely infected. Patient on Clinda and Cipro. XR of both feet show osteomyelitis involving 5th phalanges and fifth metatarsal.   - Podiatry following  - wound care per Podiatry  - Wound cultures grew pseudomonas and enterococcus  - Antibiotics per ID, vanc, merem, fluconazole    Chronic diastolic Heart Failure  Last Echo 2018 with EF 50-55%. Evidence of PAH which may be contributing. CXR 10/1 with mild cardiomegaly and interstitial edema. Suspect rt heart failure from undiagnosed sleep apnea. Echo with EF 55%, LVH and estimated pulm artery systolic pressure is at 60 mmHg   - Lasix 20 mg IV BID held per nephro for  Increase Cr  - strict I/O's, daily weights   - Low salt diet    FAHEEM on CKD 4   Baseline creat is appears around 2. Hypoalbuminemia, Albumin 2.6 on presentation.  Suspect

## 2021-10-06 NOTE — PROGRESS NOTES
Office : [phone]     Fax :[phone]       Nephrology progress  Note      Patient's Name: Mary Brown  10/6/2021    Reason for Consult:  FAHEEM on CKD 4       Requesting Physician:  Juliana Amador MD      Chief Complaint:    Chief Complaint   Patient presents with    Leg Pain     x 2 weeks. History of Present iIlness:    Mary Brown is a 60 y.o. female with past medical h/o CKD4, DVT, T2DM c/b b/l lower extremity ulcers  who presented for progressively worsening b/l lower extremity pain for the 2 weeks. Patient states she presented today with pain in legs so severe that it is now inhibiting her ability to ambulate. She endorses that the legs are also more swollen and warm to touch with pain when she touches it. She endorses she tries to eat a healthy diet low in salt, she also denies any major weight fluctuations recently.     Baseline creat is 2/0   Now elevated at 2.4        Interval hx :    No acute events overnight    creatinine stable     Edema better     Wound culture growing pseudomonas       I/O last 3 completed shifts: In: 1515.8 [P.O.:240; I.V.:329.1;  IV Piggyback:946.7]  Out: 1000 [Urine:1000]    Past Medical History:   Diagnosis Date    Asthma 05/14/2004    Bacterial vaginosis 04/2008    Carpal tunnel syndrome 05/2007    COPD (chronic obstructive pulmonary disease) ([address]     Diabetes mellitus type II 08/2007    10/1/20 pt states does accucheck 2x/day at home    Diabetic neuropathy ([address] 98/4917    Diastolic CHF ([address]     DVT (deep venous thrombosis) ([address] 03/2004    Dyslipidemia 05/2009    Dyspareunia 05/2009    ETOH abuse 03/04/2007    Feet clawing     HTN (hypertension)     Hx of blood clots     Hyperlipidemia     MRSA (methicillin resistant staph aureus) culture positive 2017; 2017    foot; leg     Neuropathy 2009    polyneuropathy    Pancreatitis 2004    Scalp lesion 2007    Tobacco abuse 2008    Uses walker     Uses wheelchair     also uses walker    Wears dentures        Past Surgical History:   Procedure Laterality Date     SECTION  unknown    FOOT DEBRIDEMENT Right 2019    INCISION AND DRAINAGE WITH APPLICATION OF STRAVIX GRAFT RIGHT FOOT performed by Emerson Fraser DPM at [address] Right 2019    RIGHT FOOT INCISION AND DRAINAGE WITH STAGING TRANSMETATARSAL AMPUTATION performed by Emerson Fraser DPM at [address] Right 2019    RIGHT FOOT DEBRIDEMENT INCISION AND DRAINAGE, OPEN DIABETIC FOOT ULCER WITH GRAFT PLACEMENT performed by Emerson Fraser DPM at [address] Left 10/23/2019    LEFT FOOT INCISION AND DRAINAGE , DEBRIDEMENT OF OPEN WOUND, APPLICATION OF STRAVIX GRAFT performed by Emerson Fraser DPM at [address] Right 3/29/2021    INCISION AND DRAINAGE, DEBRIDEMENT OF DIABETIC WOUND WITH PLACEMENT OF STRAVIX GRAFT RIGHT FOOT performed by Emerson Fraser DPM at [address] IR TUNNELED [address] 5 YEARS  10/5/2021    IR TUNNELED CATHETER PLACEMENT GREATER THAN 5 YEARS 10/5/2021 [facility] SPECIAL PROCEDURES    KNEE SURGERY Left     from falling off ladder -- has screws in place pt report    OTHER SURGICAL HISTORY Left 2016    I & D left foot    OTHER SURGICAL HISTORY Right 10/20/2017    RIGHT GASTROC LENGTHENING ENDOSCOPIC, INJECTION OF AMNI GRAFT    OTHER SURGICAL HISTORY Right 2018    Diabetic foot ulcer I&D w/ integra graft application    VT DEBRIDEMENT, SKIN, SUB-Q TISSUE,MUSCLE,BONE,=<20 SQ CM Right 2018    RIGHT FOOT DEBRIDEMENT INCISION AND DRAINAGE, PARTIAL 5TH RAY AMPUTATION performed by Emerson Fraser DPM at [address] PRE-MALIGNANT / [facility] 7/7003    cryotherapy done on lesion    TOE AMPUTATION Left 02/24/2017    AMPUTATION LEFT GREAT TOE                 TONSILLECTOMY         History reviewed. No pertinent family history. reports that she quit smoking about 3 years ago. Her smoking use included cigarettes. She has a 30.00 pack-year smoking history. She has never used smokeless tobacco. She reports that she does not drink alcohol and does not use drugs.         Allergies:  Sulfa antibiotics    Current Medications:    furosemide (LASIX) tablet 20 mg, Daily  heparin 25,000 units in dextrose 5% 250 mL (premix) infusion, Continuous  glucose (GLUTOSE) 40 % oral gel 15 g, PRN  dextrose 50 % IV solution, PRN  glucagon (rDNA) injection 1 mg, PRN  dextrose 5 % solution, PRN  insulin glargine (LANTUS;BASAGLAR) injection pen 12 Units, Nightly  insulin lispro (1 Unit Dial) 0-18 Units, TID WC  insulin lispro (1 Unit Dial) 0-9 Units, Nightly  meropenem (MERREM) 500 mg IVPB (mini-bag), Q12H  heparin (porcine) injection 8,050 Units, PRN  heparin (porcine) injection 4,020 Units, PRN  vancomycin (VANCOCIN) intermittent dosing (placeholder), See Admin Instructions  fluconazole (DIFLUCAN) 200 mg IVPB, Q24H  albuterol (PROVENTIL) nebulizer solution 2.5 mg, Q6H PRN  amitriptyline (ELAVIL) tablet 100 mg, Nightly  aspirin EC tablet 81 mg, Daily  atorvastatin (LIPITOR) tablet 20 mg, Nightly  doxazosin (CARDURA) tablet 4 mg, BID  [Held by provider] apixaban (ELIQUIS) tablet 5 mg, BID  escitalopram (LEXAPRO) tablet 10 mg, Daily  gabapentin (NEURONTIN) capsule 400 mg, BID  methadone (DOLOPHINE) tablet 140 mg, Daily  metoprolol succinate (TOPROL XL) extended release tablet 100 mg, Daily  pantoprazole (PROTONIX) tablet 40 mg, Daily  sodium chloride flush 0.9 % injection 5-40 mL, PRN  0.9 % sodium chloride infusion, PRN  ondansetron (ZOFRAN-ODT) disintegrating tablet 4 mg, Q8H PRN   Or  ondansetron (ZOFRAN) injection 4 mg, Q6H PRN  polyethylene glycol (GLYCOLAX) packet 17 g, Daily PRN  acetaminophen (TYLENOL) tablet 650 mg, Q6H PRN   Or  acetaminophen (TYLENOL) suppository 650 mg, Q6H PRN  sodium chloride flush 0.9 % injection 5-40 mL, 2 times per day  influenza quadrivalent split vaccine (FLUZONE;FLUARIX;FLULAVAL;AFLURIA) injection 0.5 mL, Prior to discharge        Review of Systems:   14 point ROS obtained but were negative except mentioned in HPI      Physical exam:     Vitals:  BP (!) 197/73   Pulse 97   Temp 97.7 °F (36.5 °C) (Oral)   Resp 14   Ht 5' 2\" (1.575 m)   Wt 202 lb 13.2 oz (92 kg)   LMP 10/23/2015   SpO2 97%   BMI 37.10 kg/m²   Constitutional:  OAA X3 NAD  Skin: no rash, turgor wnl  Heent:  eomi, mmm  Neck: no bruits or jvd noted  Cardiovascular:  S1, S2 without m/r/g  Respiratory: b/L base crackles   Abdomen:  +bs, soft, nt, nd  Ext: +  lower extremity edema  Psychiatric: mood and affect appropriate  Musculoskeletal:  Rom, muscular strength intact    Labs:  CBC:   Recent Labs     10/04/21  0336 10/05/21  0307 10/06/21  0849   WBC 7.8 7.7 5.3   HGB 7.4* 7.9* 7.2*    391 356     BMP:    Recent Labs     10/04/21  0829 10/04/21  0829 10/04/21  1220 10/05/21  0307 10/06/21  0849   *  --   --  134* 137   K 5.5*   < > 5.5* 5.1 4.9     --   --  100 102   CO2 20*  --   --  25 27   BUN 44*  --   --  45* 38*   CREATININE 3.1*  --   --  3.0* 2.3*   GLUCOSE 354*  --   --  157* 148*    < > = values in this interval not displayed. Ca/Mg/Phos:   Recent Labs     10/04/21  0829 10/05/21  0307 10/06/21  0849   CALCIUM 8.3 8.7 8.9   MG 2.00 2.00 2.00     Hepatic:   No results for input(s): AST, ALT, ALB, BILITOT, ALKPHOS in the last 72 hours. Troponin:   No results for input(s): TROPONINI in the last 72 hours. BNP: No results for input(s): BNP in the last 72 hours. Lipids: No results for input(s): CHOL, TRIG, HDL, LDLCALC, LABVLDL in the last 72 hours. ABGs: No results for input(s): PHART, PO2ART, JPM4UCD in the last 72 hours.   INR:   Recent Labs 10/05/21  1154   INR 0.98     UA:  No results for input(s): COLORU, CLARITYU, GLUCOSEU, BILIRUBINUR, KETUA, SPECGRAV, BLOODU, PHUR, PROTEINU, UROBILINOGEN, NITRU, LEUKOCYTESUR, Armando Revels in the last 72 hours. Urine Microscopic:   No results for input(s): LABCAST, BACTERIA, COMU, HYALCAST, WBCUA, RBCUA, EPIU in the last 72 hours. Urine Culture: No results for input(s): LABURIN in the last 72 hours. Urine Chemistry:   No results for input(s): Suann Pummel, PROTEINUR, NAUR in the last 72 hours. IMAGING:  IR TUNNELED CVC PLACE WO SQ PORT/PUMP > 5 YEARS   Final Result   Impression:      Successful placement of a tunneled central venous catheter via the right internal jugular vein. The catheter is ready for immediate use. VL DUP LOWER EXTREMITY ARTERIES BILATERAL         MRI FOOT LEFT WO CONTRAST   Final Result   Osteomyelitis involving the fifth metatarsal and fifth proximal phalanx with extensive osseous destruction. Mild osteomyelitis involving the lateral aspect of the cuboid. Prior partial first digit amputation. MRI FOOT RIGHT WO CONTRAST   Final Result   Multiple prior amputations. Soft tissue ulceration lateral to the cuboid with mild acute osteomyelitis involving the lateral base of the fourth metatarsal and more distal plantar aspect of the remaining fourth metatarsal shaft as well as the lateral aspect of the cuboid. XR FOOT LEFT (MIN 3 VIEWS)   Final Result      Numerous osteotomies with soft tissue swelling and possible ulcer lateral aspect of the right midfoot. No plain radiographic evidence for osteomyelitis, however plain film findings for osteomyelitis are often late findings. 3 views left foot      FINDINGS:      There is a mottled appearance of the phalanges of the right fifth digit as well as the fifth metatarsal. The remaining metatarsals unremarkable in appearance. Patient's had prior osteotomy of the first distal phalangeal tuft.       IMPRESSION: Plain radiographic evidence for osteomyelitis involving the fifth phalanges and fifth metatarsal. Patient's bones are osteopenic. XR FOOT RIGHT (MIN 3 VIEWS)   Final Result      Numerous osteotomies with soft tissue swelling and possible ulcer lateral aspect of the right midfoot. No plain radiographic evidence for osteomyelitis, however plain film findings for osteomyelitis are often late findings. 3 views left foot      FINDINGS:      There is a mottled appearance of the phalanges of the right fifth digit as well as the fifth metatarsal. The remaining metatarsals unremarkable in appearance. Patient's had prior osteotomy of the first distal phalangeal tuft. IMPRESSION:      Plain radiographic evidence for osteomyelitis involving the fifth phalanges and fifth metatarsal. Patient's bones are osteopenic. XR CHEST PORTABLE   Final Result      Mild cardiomegaly and mild interstitial edema. VL Extremity Venous Bilateral    (Results Pending)       Assessment/Plan :      1. Lin onc CKD 4   LIN 2/2 multiple factors   Edema noted  LIN worsened   Start low dose lasix In am     2. HTN. BP controlled   Continue to hold cardura     3. Acute on chronic CHF   hold lasix     4. DM 2. Needs better control     5. Electrolytes. Monitor and replace.      6. Diabetic foot infection with necrotic ulcer   Renal dose abx   ID following       Recommend to dose adjust all medications  based on renal functions  Maintain SBP> 90 mmHg   Daily weights   AVOID NSAIDs  Avoid Nephrotoxins  Monitor Intake/Output  Call if significant decrease in urine output           Thank you for allowing us to participate in care of Skip Screen         Electronically signed by: Luis Beasley MD, 10/6/2021, 10:47 AM      [facility] [address]  Office : [phone]  Fax :[phone]

## 2021-10-06 NOTE — PROGRESS NOTES
Progress Note    Admit Date: 10/1/2021  Day: 5  Diet: Diet NPO Exceptions are: Sips of Water with Meds    CC: B/L leg pain for 2-6 weeks    Interval history:   NAONE. Patient seen and examined this am. Reporting HA, denies any CP, SOB, abdominal pain, c/d, or urinary symptoms. Surgery postponed for tomorrow per podiatry. Will restart diet and heparin ggt. NPO at midnight      HPI:   Juan Ham. Ciera Rossi is a 59 yo F with PMH CKD4, DVT (2004; on Eqliquis), T2DM c/b b/l lower extremity ulcers (follows with Podiatry outpatient, last visit 9/27/21), HTN, narcotic-dependent chronic pain (on Methadone 140 mg/day) who presented for progressively worsening b/l lower extremity pain for the 2 weeks. Patient states she presented today with pain in legs so severe that it is now inhibiting her ability to ambulate. She endorses that the legs are also more swollen and warm to touch with pain when she touches it. She endorses she tries to eat a healthy diet low in salt, she also denies any major weight fluctuations recently.     On ROS, denies fever/chills, nausea/vomiting, diarrhea/constipation, urinary issues including dysuria, hematuria, or changes to amount or flow of urine. She denies abdominal pain.      On arrival to [facility] ED, VSS. Patient appeared extremely sleepy on physical exam. Chemistry significant for Na 133, BUN 42/Cr 2.5. Pro-BNP elevated 4,733 (baseline 2-3k). Trops 0.04. CBC with Hgb 9.1 Hct 28.5 at baseline. CXR with mild cardiomegaly and interstitial edema.  She was given IV Lasix 40 in the ED and admitted to the floor.        Medications:     Scheduled Meds:   insulin glargine  12 Units SubCUTAneous Nightly    insulin lispro  0-18 Units SubCUTAneous TID     insulin lispro  0-9 Units SubCUTAneous Nightly    meropenem  500 mg IntraVENous Q12H    [Held by provider] furosemide  20 mg IntraVENous BID    vancomycin (VANCOCIN) intermittent dosing (placeholder)   Other See Admin Instructions    fluconazole  200 mg IntraVENous Q24H    amitriptyline  100 mg Oral Nightly    aspirin EC  81 mg Oral Daily    atorvastatin  20 mg Oral Nightly    [Held by provider] doxazosin  4 mg Oral BID    [Held by provider] apixaban  5 mg Oral BID    escitalopram  10 mg Oral Daily    gabapentin  400 mg Oral BID    methadone  140 mg Oral Daily    [Held by provider] metoprolol succinate  100 mg Oral Daily    pantoprazole  40 mg Oral Daily    sodium chloride flush  5-40 mL IntraVENous 2 times per day    influenza virus vaccine  0.5 mL IntraMUSCular Prior to discharge     Continuous Infusions:   [Held by provider] heparin (PORCINE) Infusion 18 Units/kg/hr (10/05/21 1615)    dextrose      sodium chloride 25 mL (10/06/21 0609)     PRN Meds:glucose, dextrose, glucagon (rDNA), dextrose, heparin (porcine), heparin (porcine), albuterol, sodium chloride flush, sodium chloride, ondansetron **OR** ondansetron, polyethylene glycol, acetaminophen **OR** acetaminophen    Objective:   Vitals:   T-max:  Patient Vitals for the past 8 hrs:   BP Temp Temp src Pulse Resp SpO2 Weight   10/06/21 0556 (!) 157/77 98.5 °F (36.9 °C) Oral 76 12 97 % 202 lb 13.2 oz (92 kg)   10/06/21 0018 (!) 177/92 98.3 °F (36.8 °C) Oral 85 12 97 %        Intake/Output Summary (Last 24 hours) at 10/6/2021 0655  Last data filed at 10/6/2021 0555  Gross per 24 hour   Intake 1515.79 ml   Output 1000 ml   Net 515.79 ml     ROS as above    Physical Exam  Physical Exam  Constitutional:       General: She is not in acute distress. HENT:      Head: Normocephalic and atraumatic. Nose: No congestion or rhinorrhea. Mouth/Throat:      Mouth: Mucous membranes are moist.      Pharynx: No oropharyngeal exudate or posterior oropharyngeal erythema. Eyes:      General: No scleral icterus. Conjunctiva/sclera: Conjunctivae normal.   Cardiovascular:      Rate and Rhythm: Normal rate and regular rhythm. Heart sounds: No murmur heard. No gallop.     Pulmonary:      Effort: No respiratory distress. Breath sounds: No wheezing or rales. Abdominal:      General: There is no distension. Palpations: Abdomen is soft. Tenderness: There is no abdominal tenderness. There is no guarding. Musculoskeletal:      Cervical back: Neck supple. No tenderness. Right lower leg: Edema present. Left lower leg: Edema present. Comments: Chronic venous changes on b/l LE, mild tenderness, significant swelling. B/L feet with wounds, not purulent    Skin:     General: Skin is dry. Capillary Refill: Capillary refill takes less than 2 seconds. Neurological:      General: No focal deficit present. Mental Status: She is alert and oriented to person, place, and time. LABS:    CBC:   Recent Labs     10/03/21  0712 10/04/21  0336 10/05/21  0307   WBC 5.9 7.8 7.7   HGB 7.5* 7.4* 7.9*   HCT 23.5* 23.3* 24.9*    353 391   MCV 80.2 80.4 81.4     Renal:    Recent Labs     10/03/21  0712 10/03/21  0712 10/04/21  0829 10/04/21  1220 10/05/21  0307     --  134*  --  134*   K 4.6   < > 5.5* 5.5* 5.1     --  103  --  100   CO2 24  --  20*  --  25   BUN 41*  --  44*  --  45*   CREATININE 2.9*  --  3.1*  --  3.0*   GLUCOSE 269*  --  354*  --  157*   CALCIUM 8.2*  --  8.3  --  8.7   MG 2.00  --  2.00  --  2.00   ANIONGAP 10  --  11  --  9    < > = values in this interval not displayed. Hepatic:   No results for input(s): AST, ALT, BILITOT, BILIDIR, PROT, LABALBU, ALKPHOS in the last 72 hours. Troponin:   No results for input(s): TROPONINI in the last 72 hours. BNP: No results for input(s): BNP in the last 72 hours. Lipids: No results for input(s): CHOL, HDL in the last 72 hours. Invalid input(s): LDLCALCU, TRIGLYCERIDE  ABGs:  No results for input(s): PHART, VYL5GZB, PO2ART, LIE0ZCT, BEART, THGBART, C4MJPOVX, SZY6NWM in the last 72 hours.     INR:   Recent Labs     10/05/21  1154   INR 0.98     Lactate: No results for input(s): LACTATE in the last 72 hours.  Cultures:  -----------------------------------------------------------------  RAD:   IR TUNNELED CVC PLACE WO SQ PORT/PUMP > 5 YEARS   Final Result   Impression:      Successful placement of a tunneled central venous catheter via the right internal jugular vein. The catheter is ready for immediate use. VL DUP LOWER EXTREMITY ARTERIES BILATERAL         MRI FOOT LEFT WO CONTRAST   Final Result   Osteomyelitis involving the fifth metatarsal and fifth proximal phalanx with extensive osseous destruction. Mild osteomyelitis involving the lateral aspect of the cuboid. Prior partial first digit amputation. MRI FOOT RIGHT WO CONTRAST   Final Result   Multiple prior amputations. Soft tissue ulceration lateral to the cuboid with mild acute osteomyelitis involving the lateral base of the fourth metatarsal and more distal plantar aspect of the remaining fourth metatarsal shaft as well as the lateral aspect of the cuboid. XR FOOT LEFT (MIN 3 VIEWS)   Final Result      Numerous osteotomies with soft tissue swelling and possible ulcer lateral aspect of the right midfoot. No plain radiographic evidence for osteomyelitis, however plain film findings for osteomyelitis are often late findings. 3 views left foot      FINDINGS:      There is a mottled appearance of the phalanges of the right fifth digit as well as the fifth metatarsal. The remaining metatarsals unremarkable in appearance. Patient's had prior osteotomy of the first distal phalangeal tuft. IMPRESSION:      Plain radiographic evidence for osteomyelitis involving the fifth phalanges and fifth metatarsal. Patient's bones are osteopenic. XR FOOT RIGHT (MIN 3 VIEWS)   Final Result      Numerous osteotomies with soft tissue swelling and possible ulcer lateral aspect of the right midfoot. No plain radiographic evidence for osteomyelitis, however plain film findings for osteomyelitis are often late findings.             3 views left foot      FINDINGS:      There is a mottled appearance of the phalanges of the right fifth digit as well as the fifth metatarsal. The remaining metatarsals unremarkable in appearance. Patient's had prior osteotomy of the first distal phalangeal tuft. IMPRESSION:      Plain radiographic evidence for osteomyelitis involving the fifth phalanges and fifth metatarsal. Patient's bones are osteopenic. XR CHEST PORTABLE   Final Result      Mild cardiomegaly and mild interstitial edema. VL Extremity Venous Bilateral    (Results Pending)       Assessment/Plan:      Jacqui Ferris. Chino Hess is a 59 yo F with PMH CKD4, DVT (on Eqliquis), T2DM c/b b/l lower extremity ulcers (follows with Podiatry outpatient, last visit 9/27/21), HTN, history of pain medication addiction (on Methadone 140 mg/day) who presented for progressively worsening b/l lower extremity pain for the 2 weeks. Pre-operative evaluation  Plan to take patient down for bilateral LE I&D, R foot 4th ray resection and L foot 5th ray resection tomorrow with podiatry. Patient denies any SOB, CP, orthopnea or PND. She has no JVD or crackles on exam.   - RCRI showed Class IV Risk, 15% 30-day risk of death, MI, or cardiac arrest.   - Benefits of surgical intervention outweigh risk, okay to proceed with surgical intervention with general anesthesia. - Will place NPO and hold heparin ggt at midnight    Osteomyelitis of b/l feet  Recently debrided outpatient on 9/27/21, do not appear acutely infected. Patient on Clinda and Cipro. XR of both feet show osteomyelitis involving 5th phalanges and fifth metatarsal.   - Podiatry following  - wound care per Podiatry  - PICC line placed yesterday  - Wound cultures grew pseudomonas and enterococcus  - Antibiotics per ID, vanc, merem, fluconazole    Chronic diastolic Heart Failure  Last Echo 2018 with EF 50-55%. Evidence of PAH which may be contributing. CXR 10/1 with mild cardiomegaly and interstitial edema.  Suspect rt heart failure from undiagnosed sleep apnea. Echo with EF 55%, LVH and estimated pulm artery systolic pressure is at 60 mmHg   - Lasix 20 mg IV BID held per nephro for  Increase Cr  - strict I/O's, daily weights   - Low salt diet    FAHEEM on CKD 4 (improving)  Baseline creat is appears around 2. Hypoalbuminemia, Albumin 2.6 on presentation. Suspect from diabetic nephropathy in setting of poorly controlled diabetes. HbA1c 10/2021 at 8.3  - Improving, 2.3 today, can restart diuretics tomorrow morning, monitor renal function closely  - Monitor I/Os  - Avoid nephrotoxic agents  - Nephrology following     Hyponatremia (resolved)  Na 133 on admission.  - continue to monitor      Anemia of Chronic Anemia   Likely 2/2  Anemia of chronic disease from chronic kidney disease. Iron 16, iron sat 9, transferrin 7.8, TIBC 174  - Monitor daily CBC, stable     T2DM   BS 91 - recent outpatient visit records show good blood sugar control. HbA1c 6.8 on 06/17/21.   - HbA1c on  10/2021 at 8.3  - on wt loss, exercise, good diet and medication compliance     Hx of Opioid Pain Medication Use, Now on Methadone   - Continue Methadone 140 mg daily         Code Status: Full Code   FEN: Diet NPO Exceptions are: Sips of Water with Meds   PPX: heparin gtt  DISPO: Luna Ambrose MD, PGY-1  10/06/21  6:55 AM    This patient has been staffed and discussed with Sandy Bruno MD.

## 2021-10-06 NOTE — PROGRESS NOTES
noted to the plantar aspect subcuboid and subfifth metatarsal base. Measures 5.5 cm x 4.6 cm x 1.0 cm. Wound base mixture of granular, fibrotic, and necrotic tissue with bone exposed and with macerated periwound. Wound does not tunnel or track. Scant sanguinous drainage noted. No fluctuance, crepitus, erythema, or malodor noted. Serous bulla noted plantar heel with roof intact. No fluctuance, crepitus, erythema, malodor, or drainage noted. Left lower extremity:  Full-thickness ulceration noted plantar lateral aspect beginning at the base of the fifth digit and extending proximally towards the base of the fifth metatarsal and laterally towards the third metatarsal.  Wound measures 7.4 cm x 5.4 cm x 0.3 cm. Wound base mixture of granular, fibrotic, and necrotic tissue with macerated periwound. Scant serous drainage noted. Wound does not probe to bone, tunnel, or track. No fluctuance, crepitus, erythema, or malodor noted. MUSCULOSKELETAL: Muscle strength is 4/5 for all pedal groups tested. No pain with palpation of the foot or ankle b/l. Ankle joint ROM is decreased in dorsiflexion with the knee extended. 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 IMAGING:  MRI right foot 10/3/2021  Narrative   EXAMINATION: Magnetic resonance imaging (MRI) of the right foot without contrast       HISTORY: Osteomyelitis 5th metatarsal base; rule out OM cuboid       TECHNIQUE: MRI of the right foot was performed using multiple pulse sequences in multiple planes without contrast.       COMPARISON: Radiograph dated 10/2/2021       FINDINGS:        There have been prior first second third MTP joint, fourth transmetatarsal, and fifth TMT joint amputations. There is a soft tissue ulcer lateral to the base of the fourth metatarsal with adjacent T1 hypointensity in marrow edema involving the base of    the fourth metatarsal as well as the lateral aspect of the cuboid representing osteomyelitis. There is also soft tissue swelling more distally along the plantar aspect of the remaining fourth metatarsal shafts with osteomyelitis along the plantar cortex    of the remaining fourth metatarsal. There is no acute fracture or dislocation. There is a degenerative subchondral cyst in the navicular. No abscess is seen within limits of noncontrast examination. Visualized tendons and plantar fascia are unremarkable.           Impression   Multiple prior amputations. Soft tissue ulceration lateral to the cuboid with mild acute osteomyelitis involving the lateral base of the fourth metatarsal and more distal plantar aspect of the remaining fourth metatarsal shaft as well as the lateral aspect of the cuboid. MRI left foot 10/3/2021  Narrative   EXAMINATION: Magnetic resonance imaging (MRI) of the left foot without contrast       HISTORY: Osteomyelitis 5th metatarsal       TECHNIQUE: MRI of the left foot was performed using multiple pulse sequences in multiple planes without contrast.       COMPARISON: Radiograph dated 10/2/2021       FINDINGS:        There has been prior amputation of the first digit at the level of the base of the first proximal phalanx. There is soft tissue ulceration lateral to the fifth mid metatarsal with surrounding edema and phlegmon in the subcutaneous tissues. There is    extensive destruction of the mid to distal aspect of the fifth metatarsal and base of the fifth proximal phalanx representing sequelae of osteomyelitis. There is also mild osteomyelitis extending to the base of the fifth metatarsal as well as along the    lateral aspect of the cuboid.           Impression   Osteomyelitis involving the fifth metatarsal and fifth proximal phalanx with extensive osseous destruction. Mild osteomyelitis involving the lateral aspect of the cuboid. Prior partial first digit amputation.      Lower extremity arterial duplex 10/4/2021-preliminary  Right   Right CRISTIAN was not available due to patient non-compliance . There are multiphasic waveforms in the common femoral artery indicating no   aortoiliac inflow disease. There is atherosclerotic plaque involving the superficial femoral and   popliteal arteries with no significantly elevated velocities. Left   Left CRISTIAN was not available due to patient non-compliance . There are multiphasic waveforms in the common femoral artery indicating no   aortoiliac inflow disease. There is atherosclerotic plaque involving the superficial femoral and   popliteal arteries with no significantly elevated velocities. The peroneal artery is not visualized. There is no previous exam for comparison. X-ray bilateral foot 10/2/2021  Narrative   Reason: History of diabetes with low leg ulcers, pain       3 views of the right foot       FINDINGS: Patient's had the fifth metatarsal resected. Patient's bones are osteopenic. There is evidence of a soft tissue defect lateral aspect of the right midfoot with increased radiopacity in this region. Soft tissue swelling surrounds the right foot.    Patient prior amputations of the first second third phalanges as well as the fourth metatarsal head.           Impression       Numerous osteotomies with soft tissue swelling and possible ulcer lateral aspect of the right midfoot. No plain radiographic evidence for osteomyelitis, however plain film findings for osteomyelitis are often late findings.               3 views left foot       FINDINGS:       There is a mottled appearance of the phalanges of the right fifth digit as well as the fifth metatarsal. The remaining metatarsals unremarkable in appearance.  Patient's had prior osteotomy of the first distal phalangeal tuft.       IMPRESSION:       Plain radiographic evidence for osteomyelitis involving the fifth phalanges and fifth metatarsal. Patient's bones are osteopenic.          ASSESSMENT/PLAN  -Diabetic foot ulceration with osteomyelitis, Carpenter 3, right lower extremity  -Diabetic foot ulceration with osteomyelitis, Carpenter 3, left lower extremity  -PVD, bilateral lower extremity  -Edema, bilateral lower extremity  -Diabetes mellitus type 2 with peripheral neuropathy  -History of noncompliance with weightbearing status     -Patient examined and evaluated at the bedside   -Hypertensive, otherwise VSS. No leukocytosis noted. -ESR >120, .1  -Imaging reviewed, noted above  -Bone biopsy taken right foot, sent for pathology  -Wound culture right foot: Pseudomonas aeruginosa, E coli, Enterococcus faecalis  -Dressing applied to bilateral lower extremity consisting of Betadine soaked gauze, DSD, Ace bandage  -Continue antibiotics per ID recommendations.  -Non-weightbearing bilateral lower extremity  -Lengthy discussion with patient regarding treatment options including but not limited to more proximal amputation at this time. Patient is refusing more proximal amputation at this time. Discussed that only performing the right 4th ray with partial cuboid resection and left 5th ray with partial cuboid resection will likely still leave her with an open wound bilateral, that these procedures may still leave infected bone and will not be definitive, and that ambulation may be limited by these specific procedures; Patient understands but refuses proximal amputation.  -Patient will require surgical intervention during this in-house stay pending medical clearance. Planned for tomorrow afternoon - Bilateral lower extremity incision and drainage, with fourth ray resection right foot, fifth ray resection left foot, partial cuboid resection bilateral foot. Medicine team please risk stratify for surgical clearance.  -Consent on chart  -N.p.o. at midnight  -Hold heparin 4 hours prior to surgery      DISPO: Bilateral diabetic foot ulcerations with underlying osteomyelitis. Labs and imaging reviewed. Bone biopsy pending.  Patient will undergo surgical intervention tomorrow pending medical clearance.     Discussed assessment and plan with Dr. Annemarie Blood, RAMONE.    Gary Blake [facility]  10/06/21  10:34 AM

## 2021-10-06 NOTE — CARE COORDINATION
Case Management Assessment           Daily Note                 Date/ Time of Note: 10/6/2021 5:33 PM         Note completed by: Jus Hayes RN    Patient Name: Yovany Burris  YOB: 1965    Diagnosis:Systolic CHF, acute ([facility] [address] [I50.21]  Acute on chronic congestive heart failure, unspecified heart failure type ([facility] [address] [I50.9]  Patient Admission Status: Inpatient    Date of Admission:10/1/2021  6:16 PM Length of Stay: 5 GLOS: GMLOS: 8.3    Current Plan of Care:   Nephro / ID following :      Per MD Documentation:Wound care and surgical intervention per Podiatry, surgical planing in process  Plan for b/l I&D with R 4th ray and cuboid resection, L fifth ray and cuboid resection     Anticipate need PICC and iv antibiotics after discharge________________________________________________________________________________________  PT AM-PAC:   / 24 per last evaluation on: pending  Ordered  10/4  Will follow up  POST OP for  Dispo planning rec:      OT AM-PAC:   / 24 per last evaluation on:   pending  Ordered  10/4    DME Needs for discharge: TBD  Vs  Defer to  SNF    ________________________________________________________________________________________  Discharge Plan: Home with [address] [place] [address] Way: VS  SNF    Referral made to [facility]     Tentative discharge date: tbd     Current barriers to discharge: IV atbx  ,  Cx pending :  placment   Medical  Podiatry clearance     Referrals completed: Not Applicable  [facility] following /      Resources/ information provided: Not indicated at this time  ________________________________________________________________________________________  Case Management Notes:    Patient will likely need  SNF   D/t  Long term IV atbx  needed and  Will be  NWB  :  CM will assist  With  SNF  List  And make referral  Based on preference and choice   Patient  Provided  SNF list and  1 st choice is  [facility] [address] , Cm spoke with Jameson Hanks in admissions who will review and  Get  Back with CM if able to accept . Charolet Hamman and her family were provided with choice of provider; she and her family are in agreement with the discharge plan.     Care Transition Patient: Lianna Omalley RN  [facility]  Case Management Department  Ph: [phone]

## 2021-10-06 NOTE — PLAN OF CARE
Problem: Falls - Risk of:  Goal: Will remain free from falls  Description: Will remain free from falls  Outcome: Ongoing  Note: Discussed with pt importance to keep bed low and locked with alarm activated, nonskid socks on when out of bed, call light and belongings within reach- will monitor. Problem: Falls - Risk of:  Goal: Absence of physical injury  Description: Absence of physical injury  Outcome: Ongoing  Note: No new physical injury noted- will monitor. Problem: Pain:  Goal: Pain level will decrease  Description: Pain level will decrease  Outcome: Ongoing  Note: Patient complains of pain at level 7/10. Patient describes pain as headache. Patient requests pain medication. Patient medicated with tylenol . Will continue to monitor. Problem: Skin Integrity:  Goal: Will show no infection signs and symptoms  Description: Will show no infection signs and symptoms  Outcome: Ongoing  Note: Pt with unstageable bilateral foot ulcers that are wrapped in ace and podiatry is addressing. Problem: Skin Integrity:  Goal: Absence of new skin breakdown  Description: Absence of new skin breakdown  Outcome: Ongoing  Note: No new skin breakdown noted. Problem: Nutrition  Goal: Optimal nutrition therapy  Outcome: Ongoing  Note: Pt with good appetite so far today- will encourage po intake. Problem: OXYGENATION/RESPIRATORY FUNCTION  Goal: Patient will maintain patent airway  Outcome: Ongoing  Note: Pt maintaining patent airway and has been on room air today.

## 2021-10-06 NOTE — PROGRESS NOTES
ID Follow-up NOTE    CC:   Diabetic foot ulcer / infection  Antibiotics: Vancomycin, Meropenem    Admit Date: 10/1/2021  Hospital Day: 6    Subjective:     Patient c/o b/l foot pain      Objective:     Patient Vitals for the past 8 hrs:   BP Temp Temp src Pulse Resp SpO2 Weight   10/06/21 1259 (!) 185/81   87 16     10/06/21 1258 (!) 189/73   86 16     10/06/21 1208 (!) 181/69   83 16 96 %    10/06/21 1142 (!) 170/50 98.7 °F (37.1 °C) Oral 86 14 92 %    10/06/21 1056 (!) 158/70   91  93 %    10/06/21 1005 (!) 197/73   97      10/06/21 0810 (!) 178/73 97.7 °F (36.5 °C) Oral 79 14 97 %    10/06/21 0556 (!) 157/77 98.5 °F (36.9 °C) Oral 76 12 97 % 202 lb 13.2 oz (92 kg)     I/O last 3 completed shifts: In: 1515.8 [P.O.:240; I.V.:329.1; IV Piggyback:946.7]  Out: 1000 [Urine:1000]  I/O this shift:  In: 200 [P.O.:200]  Out: 1400 [Urine:1400]    EXAM:  GENERAL: No apparent distress.   RA  HEENT: Membranes moist, no oral lesion  NECK:  Supple, no lymphadenopathy  LUNGS: Clear b/l, no rales, no dullness  CARDIAC: RRR, no murmur appreciated  ABD:  Obese, + BS, soft / NT  EXT:  LE venous stasis, b/l foot dressing / ACE in place  NEURO: No focal neurologic findings  PSYCH: Orientation, sensorium, mood normal  LINES:  Peripheral iv       Data Review:  Lab Results   Component Value Date    WBC 5.3 10/06/2021    HGB 7.2 (L) 10/06/2021    HCT 22.9 (L) 10/06/2021    MCV 81.4 10/06/2021     10/06/2021     Lab Results   Component Value Date    CREATININE 2.3 (H) 10/06/2021    BUN 38 (H) 10/06/2021     10/06/2021    K 4.9 10/06/2021     10/06/2021    CO2 27 10/06/2021       Hepatic Function Panel:   Lab Results   Component Value Date    ALKPHOS 131 10/01/2021    ALT 13 10/01/2021    AST 16 10/01/2021    PROT 6.9 10/01/2021    PROT 6.6 07/21/2011    BILITOT <0.2 10/01/2021    BILIDIR <0.2 10/01/2021    IBILI see below 10/01/2021    LABALBU 2.6 10/01/2021       MICRO:  10/2 Wound (not know if L or R): light Ps aeruginosa (R cipro), light 2nd E coli, light Enterococcus faecalis  Pseudomonas aeruginosa (1)  Antibiotic Interpretation ISAAC   cefepime Sensitive 8 mcg/mL   ciprofloxacin Resistant >2 mcg/mL   gentamicin Sensitive <=4 mcg/mL   meropenem Sensitive <=1 mcg/mL   piperacillin-tazobactam Sensitive <=16 mcg/mL   tobramycin Sensitive <=4 mcg/mL     Escherichia coli   Antibiotic Interpretation ISAAC   amoxicillin-clavulanate Intermediate 16/8 mcg/mL   ampicillin Resistant >16 mcg/mL   ceFAZolin Resistant >16 mcg/mL   cefepime Sensitive <=2 mcg/mL   cefTRIAXone Sensitive <=1 mcg/mL   cefuroxime Sensitive 8 mcg/mL   ciprofloxacin Resistant >2 mcg/mL   ertapenem Sensitive <=0.5 mcg/mL   gentamicin Sensitive <=4 mcg/mL   meropenem Sensitive <=1 mcg/mL   piperacillin-tazobactam Sensitive <=16 mcg/mL   trimethoprim-sulfamethoxazole Sensitive <=2/38 mcg/mL     Enterococcus  faecalis   Antibiotic Interpretation ISAAC   ampicillin Sensitive <=2 mcg/mL   vancomycin Sensitive 2 mcg/mL       IMAGING:  10/3 L foot MRI  Impression   Osteomyelitis involving the fifth metatarsal and fifth proximal phalanx with extensive osseous destruction. Mild osteomyelitis involving the lateral aspect of the cuboid. Prior partial first digit amputation     10/3 R foot MRI   Impression   Multiple prior amputations. Soft tissue ulceration lateral to the cuboid with mild acute osteomyelitis involving the lateral base of the fourth metatarsal and more distal plantar aspect of the remaining fourth metatarsal shaft as well as the lateral aspect of the cuboid.        Scheduled Meds:   furosemide  20 mg Oral Daily    vancomycin  500 mg IntraVENous Once    insulin glargine  12 Units SubCUTAneous Nightly    insulin lispro  0-18 Units SubCUTAneous TID WC    insulin lispro  0-9 Units SubCUTAneous Nightly    meropenem  500 mg IntraVENous Q12H    vancomycin (VANCOCIN) intermittent dosing (placeholder)   Other See Admin Instructions    radiologic images - reviewed MRI with Radiologist 10/4  Microbiology cultures and other micro tests reviewed      Discussed with pt  Discussed with Podiatry Resident on 10/4  Clive Zarate MD

## 2021-10-06 NOTE — PROGRESS NOTES
Clinical Pharmacy Progress Note    Vancomycin - Management by Pharmacy    Consult Date(s): 10/2/21  Consulting Provider(s): Dr. Jann Parra / Plan    Osteomyelitis of B/L feet - Vancomycin   Concurrent Antimicrobials:   o Meropenem - day #5  o Fluconazole - day #5   Day of Vanc Therapy: day #5   Current Dosing Method: Intermittent   Therapeutic Goal: 15-20 mcg/mL   Current Dose / Frequency: Intermittent dosing   Plan / Rationale:   o Patient has FAHEEM on CKD, though SCr is trending down (2.3 today, close to baseline of 2.0) and UOP improved  o Will continue to dose intermittently at this time and may be able to start scheduled dosing once renal function stable. o Random vancomycin level this AM was 18.6mcg/mL after 500 mg given yesterday  o Will order vancomycin 500 mg IV x1 dose today (24h after dose yesterday)  o Random level ordered for tomorrow AM.    [facility] Will continue to monitor clinical condition and make adjustments to regimen as appropriate. Please call with questions:  [phone] (wireless)  [phone] (office)    Prudence Basket. Ayse BAEZ BCPS    10/6/2021 11:28 AM              Interval Update:  Remains afebrile. SCr improved today (3.1--> 3.0--> 2.3). UOP improved to 2.2 ml/kg/min. Wound culture growing E Faecalis, E. Coli and Pseudomonas. Tunneled cath placed yesterday      Subjective/Objective: Ms. Casper Handley is a 60 y.o. female with a PMHx significant for CKD4, DVT (2004) on apixaban, DM2, chronic B/L LE ulcers, HTN, and narcotic-dependent chronic pain on methadone admitted for B/L LE osteomyelitis. Pharmacy has been consulted to dose vancomycin. Height:   Ht Readings from Last 1 Encounters:   10/01/21 5' 2\" (1.575 m)     Weight:   Wt Readings from Last 1 Encounters:   10/06/21 202 lb 13.2 oz (92 kg)     Level(s) / Doses:    Date Time Dose Level / Type of Level Interpretation   10/2 1401 2250 mg IV x1     10/3 0712   Random = 24.5 mcg/mL Drawn ~17 hrs after previous dose given.  Hold dose.   10/4 0829  Random =18.1 mcg/mL  Re-dose 500 mg IV x1    1231 500 mg IV x1     10/5 0307 500 mg IV x1 Random = 18.8 mcg/mL Drawn 14.5h after dose  Will re-dose 500 mg IV x1   10/6 0849  Random = 18.6 mcg/mL Drawn 18h after dose. Will re-dose 500 mg IV x1              Cultures & Sensitivities:    Date Site Micro Susceptibility / Result   10/2 Foot wound  Pseudomonas aeruginosa     S: cefepime, gent, meropenem, Zosyn, tobramycin  R: Cipro     Foot wound E. Coli  S: Cefepime, ceftriaxone, cefuroxime, ertapenem, gent, meropenem, Zosyn, Bactrim  R: Cipro    Foot wound  Enterococcus faecalis S: Ampicillin, Vancomycin       Labs / Ancillary Data:    Estimated Creatinine Clearance: 28 mL/min (A) (based on SCr of 2.3 mg/dL (H)).     Recent Labs     10/04/21  0336 10/04/21  0829 10/05/21  0307 10/06/21  0849   CREATININE  --  3.1* 3.0* 2.3*   BUN  --  44* 45* 38*   WBC 7.8  --  7.7 5.3

## 2021-10-06 NOTE — PROGRESS NOTES
The patient is doing much better today. Kidney function is better. We can start her on a low-dose of oral diuretics from tomorrow morning.   From renal standpoint patient is okay to be discharged

## 2021-10-06 NOTE — PROGRESS NOTES
Clinical Pharmacy Progress Note    PERTINENT MEDICATIONS:  Meropenem 500mg IV q12h       LABORATORY:  Recent Labs     10/06/21  0849      K 4.9      CO2 27   BUN 38*   CREATININE 2.3*       Estimated Creatinine Clearance: 28 mL/min (A) (based on SCr of 2.3 mg/dL (H)). ASSESSMENT/PLAN:  1)  Renal Dose adjustment:   · Patient with FAHEEM. Scr today 2.3, an improvement from yesterday's 3.   · Due to change in renal function, will adjust per [facility] Renal Dose Policy to meropenem 5261DK IV q12h. · Will continue to monitor renal function, and adjust dosing as appropriate.        Please call with questions --   Tory Dillard PharmD., BCPS   10/6/2021 2:15 PM  Wireless: [phone]

## 2021-10-06 NOTE — PROGRESS NOTES
Office : [phone]     Fax :[phone]       Nephrology progress  Note      Patient's Name: Catie Blanco  10/5/2021    Reason for Consult:  FAHEEM on CKD 4       Requesting Physician:  Marcus Beckett MD      Chief Complaint:    Chief Complaint   Patient presents with    Leg Pain     x 2 weeks. History of Present iIlness:    Catie Blanco is a 60 y.o. female with past medical h/o CKD4, DVT, T2DM c/b b/l lower extremity ulcers  who presented for progressively worsening b/l lower extremity pain for the 2 weeks. Patient states she presented today with pain in legs so severe that it is now inhibiting her ability to ambulate. She endorses that the legs are also more swollen and warm to touch with pain when she touches it.  She endorses she tries to eat a healthy diet low in salt, she also denies any major weight fluctuations recently.     Baseline creat is 2/0   Now elevated at 2.4        Interval hx :    No acute events overnight    creatinine stable     Edema better     Wound culture growing pseudomonas       I/O last 3 completed shifts:  In: -   Out: 650 [Urine:650]    Past Medical History:   Diagnosis Date    Asthma 05/14/2004    Bacterial vaginosis 04/2008    Carpal tunnel syndrome 05/2007    COPD (chronic obstructive pulmonary disease) ([address]     Diabetes mellitus type II 08/2007    10/1/20 pt states does accucheck 2x/day at home    Diabetic neuropathy ([address] 70/5430    Diastolic CHF ([address]     DVT (deep venous thrombosis) ([address] 03/2004    Dyslipidemia 05/2009    Dyspareunia 05/2009    ETOH abuse 03/04/2007    Feet clawing     HTN (hypertension)     Hx of blood clots     Hyperlipidemia     MRSA (methicillin resistant staph aureus) culture positive 11/06/2017; 2017    foot; leg     Neuropathy 2009    polyneuropathy    Pancreatitis 2004    Scalp lesion 2007    Tobacco abuse 2008    Uses walker     Uses wheelchair     also uses walker    Wears dentures        Past Surgical History:   Procedure Laterality Date     SECTION  unknown    FOOT DEBRIDEMENT Right 2019    INCISION AND DRAINAGE WITH APPLICATION OF STRAVIX GRAFT RIGHT FOOT performed by Jason Bronson DPM at [address] Right 2019    RIGHT FOOT INCISION AND DRAINAGE WITH STAGING TRANSMETATARSAL AMPUTATION performed by Jason Bronson DPM at [address] Right 2019    RIGHT FOOT DEBRIDEMENT INCISION AND DRAINAGE, OPEN DIABETIC FOOT ULCER WITH GRAFT PLACEMENT performed by Jason Bronson DPM at [address] Left 10/23/2019    LEFT FOOT INCISION AND DRAINAGE , DEBRIDEMENT OF OPEN WOUND, APPLICATION OF STRAVIX GRAFT performed by Jason Bronson DPM at [address] Right 3/29/2021    INCISION AND DRAINAGE, DEBRIDEMENT OF DIABETIC WOUND WITH PLACEMENT OF STRAVIX GRAFT RIGHT FOOT performed by Jason Bronson DPM at [address]UN[address] 5 YEARS  10/5/2021    IR TUNNELED CATHETER PLACEMENT GREATER THAN 5 YEARS 10/5/2021 [facility] SPECIAL PROCEDURES    KNEE SURGERY Left     from falling off ladder -- has screws in place pt report    OTHER SURGICAL HISTORY Left 2016    I & D left foot    OTHER SURGICAL HISTORY Right 10/20/2017    RIGHT GASTROC LENGTHENING ENDOSCOPIC, INJECTION OF AMNI GRAFT    OTHER SURGICAL HISTORY Right 2018    Diabetic foot ulcer I&D w/ integra graft application    TX DEBRIDEMENT, SKIN, SUB-Q TISSUE,MUSCLE,BONE,=<20 SQ CM Right 2018    RIGHT FOOT DEBRIDEMENT INCISION AND DRAINAGE, PARTIAL 5TH RAY AMPUTATION performed by Jason Bronson DPM at [address] PRE-MAL[address]NT / [address]  7[address]    cryotherapy done on lesion    TOE AMPUTATION Left 02/24/2017    AMPUTATION LEFT GREAT TOE                 TONSILLECTOMY         History reviewed. No pertinent family history. reports that she quit smoking about 3 years ago. Her smoking use included cigarettes. She has a 30.00 pack-year smoking history. She has never used smokeless tobacco. She reports that she does not drink alcohol and does not use drugs.         Allergies:  Sulfa antibiotics    Current Medications:    heparin 25,000 units in dextrose 5% 250 mL (premix) infusion, Continuous  glucose (GLUTOSE) 40 % oral gel 15 g, PRN  dextrose 50 % IV solution, PRN  glucagon (rDNA) injection 1 mg, PRN  dextrose 5 % solution, PRN  insulin glargine (LANTUS;BASAGLAR) injection pen 12 Units, Nightly  insulin lispro (1 Unit Dial) 0-18 Units, TID WC  insulin lispro (1 Unit Dial) 0-9 Units, Nightly  meropenem (MERREM) 500 mg IVPB (mini-bag), Q12H  heparin (porcine) injection 8,050 Units, PRN  heparin (porcine) injection 4,020 Units, PRN  [Held by provider] furosemide (LASIX) injection 20 mg, BID  vancomycin (VANCOCIN) intermittent dosing (placeholder), See Admin Instructions  fluconazole (DIFLUCAN) 200 mg IVPB, Q24H  albuterol (PROVENTIL) nebulizer solution 2.5 mg, Q6H PRN  amitriptyline (ELAVIL) tablet 100 mg, Nightly  aspirin EC tablet 81 mg, Daily  atorvastatin (LIPITOR) tablet 20 mg, Nightly  [Held by provider] doxazosin (CARDURA) tablet 4 mg, BID  [Held by provider] apixaban (ELIQUIS) tablet 5 mg, BID  escitalopram (LEXAPRO) tablet 10 mg, Daily  gabapentin (NEURONTIN) capsule 400 mg, BID  methadone (DOLOPHINE) tablet 140 mg, Daily  [Held by provider] metoprolol succinate (TOPROL XL) extended release tablet 100 mg, Daily  pantoprazole (PROTONIX) tablet 40 mg, Daily  sodium chloride flush 0.9 % injection 5-40 mL, PRN  0.9 % sodium chloride infusion, PRN  ondansetron (ZOFRAN-ODT) disintegrating tablet 4 mg, Q8H PRN   Or  ondansetron (ZOFRAN) injection 4 mg, Q6H PRN  polyethylene glycol (GLYCOLAX) packet 17 g, Daily PRN  acetaminophen (TYLENOL) tablet 650 mg, Q6H PRN   Or  acetaminophen (TYLENOL) suppository 650 mg, Q6H PRN  sodium chloride flush 0.9 % injection 5-40 mL, 2 times per day  influenza quadrivalent split vaccine (FLUZONE;FLUARIX;FLULAVAL;AFLURIA) injection 0.5 mL, Prior to discharge        Review of Systems:   14 point ROS obtained but were negative except mentioned in HPI      Physical exam:     Vitals:  BP (!) 165/75   Pulse 76   Temp 97.6 °F (36.4 °C) (Oral)   Resp 16   Ht 5' 2\" (1.575 m)   Wt 205 lb 0.4 oz (93 kg)   LMP 10/23/2015   SpO2 98%   BMI 37.50 kg/m²   Constitutional:  OAA X3 NAD  Skin: no rash, turgor wnl  Heent:  eomi, mmm  Neck: no bruits or jvd noted  Cardiovascular:  S1, S2 without m/r/g  Respiratory: b/L base crackles   Abdomen:  +bs, soft, nt, nd  Ext: +  lower extremity edema  Psychiatric: mood and affect appropriate  Musculoskeletal:  Rom, muscular strength intact    Labs:  CBC:   Recent Labs     10/03/21  0712 10/04/21  0336 10/05/21  0307   WBC 5.9 7.8 7.7   HGB 7.5* 7.4* 7.9*    353 391     BMP:    Recent Labs     10/03/21  0712 10/03/21  0712 10/04/21  0829 10/04/21  1220 10/05/21  0307     --  134*  --  134*   K 4.6   < > 5.5* 5.5* 5.1     --  103  --  100   CO2 24  --  20*  --  25   BUN 41*  --  44*  --  45*   CREATININE 2.9*  --  3.1*  --  3.0*   GLUCOSE 269*  --  354*  --  157*    < > = values in this interval not displayed. Ca/Mg/Phos:   Recent Labs     10/03/21  0712 10/04/21  0829 10/05/21  0307   CALCIUM 8.2* 8.3 8.7   MG 2.00 2.00 2.00     Hepatic:   No results for input(s): AST, ALT, ALB, BILITOT, ALKPHOS in the last 72 hours. Troponin:   No results for input(s): TROPONINI in the last 72 hours. BNP: No results for input(s): BNP in the last 72 hours. Lipids: No results for input(s): CHOL, TRIG, HDL, LDLCALC, LABVLDL in the last 72 hours. ABGs: No results for input(s): PHART, PO2ART, PEY4NQO in the last 72 hours.   INR:   Recent Labs     10/05/21  1154   INR 0.98     UA:  No results for input(s): Theone Ground, GLUCOSEU, BILIRUBINUR, KETUA, SPECGRAV, BLOODU, PHUR, PROTEINU, UROBILINOGEN, NITRU, LEUKOCYTESUR, Armando Revels in the last 72 hours. Urine Microscopic:   No results for input(s): LABCAST, BACTERIA, COMU, HYALCAST, WBCUA, RBCUA, EPIU in the last 72 hours. Urine Culture: No results for input(s): LABURIN in the last 72 hours. Urine Chemistry:   No results for input(s): Suann Pummel, PROTEINUR, NAUR in the last 72 hours. IMAGING:  IR TUNNELED CVC PLACE WO SQ PORT/PUMP > 5 YEARS   Final Result   Impression:      Successful placement of a tunneled central venous catheter via the right internal jugular vein. The catheter is ready for immediate use. VL DUP LOWER EXTREMITY ARTERIES BILATERAL         MRI FOOT LEFT WO CONTRAST   Final Result   Osteomyelitis involving the fifth metatarsal and fifth proximal phalanx with extensive osseous destruction. Mild osteomyelitis involving the lateral aspect of the cuboid. Prior partial first digit amputation. MRI FOOT RIGHT WO CONTRAST   Final Result   Multiple prior amputations. Soft tissue ulceration lateral to the cuboid with mild acute osteomyelitis involving the lateral base of the fourth metatarsal and more distal plantar aspect of the remaining fourth metatarsal shaft as well as the lateral aspect of the cuboid. XR FOOT LEFT (MIN 3 VIEWS)   Final Result      Numerous osteotomies with soft tissue swelling and possible ulcer lateral aspect of the right midfoot. No plain radiographic evidence for osteomyelitis, however plain film findings for osteomyelitis are often late findings. 3 views left foot      FINDINGS:      There is a mottled appearance of the phalanges of the right fifth digit as well as the fifth metatarsal. The remaining metatarsals unremarkable in appearance. Patient's had prior osteotomy of the first distal phalangeal tuft. IMPRESSION:      Plain radiographic evidence for osteomyelitis involving the fifth phalanges and fifth metatarsal. Patient's bones are osteopenic. XR FOOT RIGHT (MIN 3 VIEWS)   Final Result      Numerous osteotomies with soft tissue swelling and possible ulcer lateral aspect of the right midfoot. No plain radiographic evidence for osteomyelitis, however plain film findings for osteomyelitis are often late findings. 3 views left foot      FINDINGS:      There is a mottled appearance of the phalanges of the right fifth digit as well as the fifth metatarsal. The remaining metatarsals unremarkable in appearance. Patient's had prior osteotomy of the first distal phalangeal tuft. IMPRESSION:      Plain radiographic evidence for osteomyelitis involving the fifth phalanges and fifth metatarsal. Patient's bones are osteopenic. XR CHEST PORTABLE   Final Result      Mild cardiomegaly and mild interstitial edema. VL Extremity Venous Bilateral    (Results Pending)       Assessment/Plan :      1. Lin onc CKD 4   LIN 2/2 multiple factors   Edema noted  LIN worsened   Hold diuretics     2. HTN. BP controlled   Continue to hold cardura     3. Acute on chronic CHF   hold lasix     4. DM 2. Needs better control     5. Electrolytes. Monitor and replace.      6. Diabetic foot infection with necrotic ulcer   Renal dose abx   ID following       Recommend to dose adjust all medications  based on renal functions  Maintain SBP> 90 mmHg   Daily weights   AVOID NSAIDs  Avoid Nephrotoxins  Monitor Intake/Output  Call if significant decrease in urine output           Thank you for allowing us to participate in care of Shayne Jiménez         Electronically signed by: Kate Lang MD, 10/5/2021, 10:57 PM      [facility] [address]  Office : [phone]  Fax :[phone]

## 2021-10-06 NOTE — PROGRESS NOTES
D: During bedside report last pm around 1930 found pt very lethargic, bent forward on R-side between side rails on bedside table all most falling OOB. Side rails were put up x3 and placed purple wedge between rails. Held pm dose of Elavil and Gabapentin. While trying to eat last pm, was leaning  forward on top of food tray knocking food onto floor. Arouses easily, but falling back to sleep. Dr. Paco Thomas MD was notified and came to bedside to see pt's behavior. RN asked MD to address with day team about decreasing narcotics d/t renal impairment.   A: Cont to monitor during hourly rounds

## 2021-10-07 NOTE — ANESTHESIA PROCEDURE NOTES
Peripheral Block    Patient location during procedure: pre-op  Start time: 10/7/2021 1:05 PM  End time: 10/7/2021 1:15 PM  Staffing  Performed: anesthesiologist   Anesthesiologist: Radha Zafar DO  Preanesthetic Checklist  Completed: patient identified, IV checked, site marked, risks and benefits discussed, surgical consent, monitors and equipment checked, pre-op evaluation, timeout performed, anesthesia consent given, oxygen available and patient being monitored  Peripheral Block  Patient position: sitting  Prep: ChloraPrep  Patient monitoring: cardiac monitor, continuous pulse ox, frequent blood pressure checks and IV access  Block type: Sciatic  Laterality: bilateral  Injection technique: single-shot  Guidance: ultrasound guided  Local infiltration: bupivacaine  Popliteal  Provider prep: mask  Local infiltration: bupivacaine  Needle  Needle type: combined needle/nerve stimulator   Needle gauge: 21 G  Needle length: 8 cm  Assessment  Injection assessment: negative aspiration for heme, no paresthesia on injection and local visualized surrounding nerve on ultrasound  Paresthesia pain: none  Slow fractionated injection: yes  Hemodynamics: stable  Medications Administered  Bupivacaine (MARCAINE) PF injection 0.25%, 60 mL  Reason for block: post-op pain management and at surgeon's request

## 2021-10-07 NOTE — PROGRESS NOTES
(1.575 m)     Weight:   Wt Readings from Last 1 Encounters:   10/07/21 203 lb 4.2 oz (92.2 kg)     Level(s) / Doses:    Date Time Dose Level / Type of Level Interpretation   10/2 1401 2250 mg IV x1     10/3 0712   Random = 24.5 mcg/mL Drawn ~19 hrs after previous dose given. Hold dose. 10/4 0829  Random =18.1 mcg/mL  Re-dose 500 mg IV x1    1231 500 mg IV x1     10/5 0307  Random = 18.8 mcg/mL Drawn 14.5 hrs after dose    1231 500 mg IV x1  Re-dose with 500 mg IV x 1   10/6 0849  Random = 18.6 mcg/mL Drawn 18h after dose. Will re-dose 500 mg IV x1    1511 500 mg IV x 1     10/7 0520  Random = 15.6 mcg/mL Switch to AUC-based dosing. Schedule vancomycin 750 mg IV Q24 hours and re-assess random 10/8. Cultures & Sensitivities:    Date Site Micro Susceptibility / Result   10/2 Foot wound Pseudomonas aeruginosa     S: cefepime, gent, meropenem, Zosyn, tobramycin  R: Cipro     Foot wound E. coli  S: Cefepime, ceftriaxone, cefuroxime, ertapenem, gent, meropenem, Zosyn, Bactrim  R: Cipro    Foot wound  Enterococcus faecalis S: Ampicillin, Vancomycin      Foot wound Providencia stuartii      Labs / Ancillary Data:    Estimated Creatinine Clearance: 34 mL/min (A) (based on SCr of 1.9 mg/dL (H)).     Recent Labs     10/05/21  0307 10/06/21  0849 10/07/21  0520   CREATININE 3.0* 2.3* 1.9*   BUN 45* 38* 37*   WBC 7.7 5.3 6.2

## 2021-10-07 NOTE — OP NOTE
Operative Note      Patient: Lachelle Duran  YOB: 1965  MRN: [id number]    Date of Procedure: 10/7/2021    Pre-Op Diagnosis: Osteomyelitis bilateral feet    Post-Op Diagnosis: Same       Procedure(s):  BILATERAL LOWER EXTREMITY INCISION AND DRAINAGE, RIGHT FOOT 4TH RAY RESECTION, RIGHT FOOT PARTIAL CUBOID RESECTION, LEFT FOOT 5TH RAY RESECTION     Surgeon(s):  Lynne Moreno DPM     Assistant:  Resident: Petty Garrett DPM; Jack Ambrose DPM  Student: Tian Decker MS4     Anesthesia: General, with bilateral popliteal blocks preoperatively     Injectables: 4 cc AmniFlo     Materials: SurgiFoam, 2-0 nylon     Hemostasis: Electrocautery     Estimated Blood Loss (mL): 577      Complications: None    Specimens:   ID Type Source Tests Collected by Time Destination   1 : RIGHT FOOT ULCER Tissue Tissue CULTURE, FUNGUS, CULTURE, TISSUE, CULTURE WITH SMEAR, ACID FAST [facility] 10/7/2021 1427    A : RIGHT FOOT 4TH METATARSAL Tissue Tissue SURGICAL PATHOLOGY [facility] 10/7/2021 1408    B : LEFT FOOT 5TH DIGIT Tissue Tissue SURGICAL PATHOLOGY [facility] 10/7/2021 1412    C : RIGHT FOOT CUBOID Tissue Tissue SURGICAL PATHOLOGY [facility] 10/7/2021 1413    D : RIGHT FOOT CUBOID CLEARANCE FRAGMENT Tissue Tissue SURGICAL PATHOLOGY [facility] 10/7/2021 1417    E : LEFT FOOT 5TH METATARSAL Tissue Tissue SURGICAL PATHOLOGY [facility] 10/7/2021 1419    F : LEFT FOOT CUBOID CLEARANCE FRAGMENT Tissue Tissue SURGICAL PATHOLOGY [facility] 10/7/2021 1421        Implants:  Implant Name Type Inv.  Item Serial No.  Lot No. LRB No. Used Action   ALLOGRAFT HUM TISS 1 CC AMNIO TISS [employer]BRN AMNIFLO CRYOPRES  ALLOGRAFT HUM TISS 1 CC AMNIO TISS [employer] AMNIFLO CRYOPRES  BONE BANK ALLOGRAFTS-WD [de-identified] N/A 4 Implanted         Drains: * No LDAs found *     Findings: No purulence noted bilateral.  Left foot: Fifth metatarsal necrotic; cuboid hard, shiny, white, consistent with healthy bone, biopsy of cuboid taken for pathology. Right foot: Fourth metatarsal necrotic, distal aspect of cuboid necrotic, clearance fragment taken of the cuboid for pathology. INDICATIONS FOR PROCEDURE: This patient has signs and symptoms clinically consistent with the above mentioned preoperative diagnosis. Having failed conservative treatment, it was determined that the patient would benefit from surgical intervention. All potential risks, benefits, and complications were discussed with the patient prior to the scheduling of surgery. All of the patient's questions were answered and no guarantees were given. The patient wished to proceed with surgery, and informed written consent was obtained. DETAILS OF PROCEDURE: The patient was brought from the pre-operative area, where she received bilateral popliteal blocks, and was placed on the operating table in the supine position . Following IV sedation, the bilateral  lower extremity was then scrubbed, prepped, and draped in the usual sterile fashion. A time-out was performed. The patient, procedure, and operative site were confirmed. The following procedures were then performed. DETAILS OF PROCEDURE: BILATERAL LOWER EXTREMITY INCISION AND DRAINAGE, LEFT FOOT 5TH RAY RESECTION, RIGHT FOOT 4TH RAY RESECTION, RIGHT FOOT PARTIAL CUBOID RESECTION:  At this time, attention was directed to the full-thickness ulceration noted to the plantar lateral aspect from the fifth metatarsal base up to the fifth metatarsophalangeal joint. Utilizing a #15 blade, an incision was made ellipsing the wound and was carried down to bone, excisionally debriding all nonviable soft tissue down to but not including bone. Attention was then directed to the fifth digit which was noted to have osteomyelitis on the preoperative MRI, where a full-thickness incision was made circumferentially at the sulcus.   The fifth digit was disarticulated at the fifth metatarsophalangeal joint utilizing a #15 blade, and handed to the back table. Attention was then directed to the fifth metatarsal which appeared gray and soft. Using a combination of sharp and blunt debridement, the fifth metatarsal was freed from its soft tissue attachments and handed off to the back table to be sent for pathology. Attention was then directed to the cuboid articular surface which was noted to be hard, shiny, and white, consistent with healthy bone. A biopsy was taken of the cuboid to be sent for pathology. Various fascial planes were explored utilizing Metzenbaum scissors and no purulence or tracking of infection was noted. Attention was then directed to the right foot where a full-thickness ulceration was noted to the distal plantar lateral aspect of the foot under the remaining fourth metatarsal.  Utilizing a #15 blade, an incision was made ellipsing the wound and was carried down to bone, excisionally debriding all nonviable soft tissue down to but not including bone. Attention was then directed to the fourth metatarsal base which was noted to be gray and soft. The remaining fourth metatarsal was freed from all soft tissue attachments and handed off to the back table to be sent for pathology. Attention was then directed to the cuboid which appeared gray and  soft. Utilizing a osteotome, the distal aspect of the cuboid was resected and handed off to the back table to be sent for pathology. Next utilizing sagittal saw, a clearance fragment was taken from the cuboid to be sent for pathology. Various fascial planes were explored utilizing Metzenbaum scissors and no purulence or tracking of infection was noted. Next both surgical sites were copiously irrigated with normal sterile saline via pulse lavage. Upon completion of this, hemostasis which was achieved with electrocauterization and Gelfoam.  Retention sutures were placed to prevent retraction of the skin utilizing 2-0 nylon. Next, attention was drawn to the bilateral surgical sites. After the AmniFlo was thawed per manufacture's instruction, it was then drawn up into a sterile syringe and injected within the surgical sites bilaterally. The graft was applied in efforts to reduce inflammation, and prevent adhesions and fibrotic scar tissue. A soft sterile dressing was applied to bilateral lower extremity consisting of gauze, ABD, cast padding, Ace bandage. END OF PROCEDURE: The patient tolerated the procedure but had hypotension which was managed by the anesthesia team.  1 unit of blood was ordered intraoperatively given the intraoperative blood loss and updated preoperative hemoglobin lab value. The patient left the OR with vital signs stable and vascular status intact to the distal aspect of bilateral foot. Following a period of postoperative monitoring the patient will be sent back to her in-house room where she will continue to receive IV antibiotic therapy per the recommendations of Dr. Henry Rodriguez. Once the bone biopsy results are available and reviewed, she will be brought back to the operating room for further debridement or delayed primary wound closure.     Dictated on behalf of Dr. Carlos Eduardo Jacobs DPM.    Electronically signed by Mariia Fuller DPM on 10/7/2021 at 3:45 PM

## 2021-10-07 NOTE — PROGRESS NOTES
Physical Therapy/Occupational Therapy  Discharge    Pt on bedrest currently, also scheduled for bilateral lower extremity incision and drainage, with fourth ray resection right foot, fifth ray resection left foot, partial cuboid resection bilateral foot later today. Will discharge at this time and await post op orders.       Liu Shane PT  [id number]

## 2021-10-07 NOTE — PROGRESS NOTES
1250 Arrived to PACU pre-op. Needs to void - bedpan given - mainly on gown. Wants to go to BR. Does not bear weight on feet. Last food and drink yesterday. Repeatedly requests ice chips or juice and to go to bathroom. Dr Madhuri Briscoe explained can put tirado in after asleep. Preparing to do block. Patient reports allergy to sulfa. Lungs clear, feet wrapped in ACE wraps. [address] [facility] Dr Adal Rosales instructed me to hold hydralizine . BP [id number] pre-procedure check complete for block. NS started at keep open. Anesthesia consent signed. 285[address] [facility] 5 Dr Adal Rosales aware  1309 O2 on at 2 L after fentanyl and versed. Block right and left. Did not give clear answer who wants called after OR or if anyone is here. 1322 /80, 12, 99% on 2L HR 60  1326 to OR.

## 2021-10-07 NOTE — PROGRESS NOTES
ID Follow-up NOTE    CC:   Diabetic foot ulcer / infection  Antibiotics: Vancomycin, Meropenem    Admit Date: 10/1/2021  Hospital Day: 7    Subjective:     NPO for surgery later today    Patient c/o b/l foot pain      Objective:     Patient Vitals for the past 8 hrs:   BP Temp Temp src Pulse Resp SpO2 Weight   10/07/21 0945 (!) 179/72 97.5 °F (36.4 °C) Oral 60 16 94 %    10/07/21 0858 (!) 179/75 97.7 °F (36.5 °C) Oral 59 16 94 %    10/07/21 0851 (!) 173/68 97.8 °F (36.6 °C) Oral 60 16 93 %    10/07/21 0846 (!) 178/67 97.4 °F (36.3 °C) Oral 62 16 94 %    10/07/21 0840 (!) 175/75 97.6 °F (36.4 °C) Oral 60 16 98 %    10/07/21 0828 (!) 186/74 97.6 °F (36.4 °C) Oral 61 16 94 %    10/07/21 0733 117/79 97.5 °F (36.4 °C) Oral 62 14 94 %    10/07/21 0518 (!) 164/52 98.5 °F (36.9 °C) Oral 60 16 95 % 203 lb 4.2 oz (92.2 kg)     I/O last 3 completed shifts: In: 26 [P.O.:470]  Out: 1800 [Urine:1800]  No intake/output data recorded. EXAM:  GENERAL: No apparent distress.   RA  HEENT: Membranes moist, no oral lesion  NECK:  Supple, no lymphadenopathy  LUNGS: Clear b/l, no rales, no dullness  CARDIAC: RRR, no murmur appreciated  ABD:  Obese, + BS, soft / NT  EXT:  LE venous stasis, b/l foot dressing / ACE in place  NEURO: No focal neurologic findings  PSYCH: Orientation, sensorium, mood normal  LINES:  Peripheral iv       Data Review:  Lab Results   Component Value Date    WBC 6.2 10/07/2021    HGB 6.9 (LL) 10/07/2021    HCT 21.3 (L) 10/07/2021    MCV 80.8 10/07/2021     10/07/2021     Lab Results   Component Value Date    CREATININE 1.9 (H) 10/07/2021    BUN 37 (H) 10/07/2021     10/07/2021    K 4.6 10/07/2021     10/07/2021    CO2 27 10/07/2021       Hepatic Function Panel:   Lab Results   Component Value Date    ALKPHOS 131 10/01/2021    ALT 13 10/01/2021    AST 16 10/01/2021    PROT 6.9 10/01/2021    PROT 6.6 07/21/2011    BILITOT <0.2 10/01/2021    BILIDIR <0.2 10/01/2021    IBILI see below 10/01/2021    LABALBU 2.6 10/01/2021       MICRO:  10/2 Wound (not know if L or R): light Ps aeruginosa (R cipro), light 2nd E coli, light Enterococcus faecalis  Pseudomonas aeruginosa (1)  Antibiotic Interpretation ISAAC   cefepime Sensitive 8 mcg/mL   ciprofloxacin Resistant >2 mcg/mL   gentamicin Sensitive <=4 mcg/mL   meropenem Sensitive <=1 mcg/mL   piperacillin-tazobactam Sensitive <=16 mcg/mL   tobramycin Sensitive <=4 mcg/mL     Escherichia coli   Antibiotic Interpretation ISAAC   amoxicillin-clavulanate Intermediate 16/8 mcg/mL   ampicillin Resistant >16 mcg/mL   ceFAZolin Resistant >16 mcg/mL   cefepime Sensitive <=2 mcg/mL   cefTRIAXone Sensitive <=1 mcg/mL   cefuroxime Sensitive 8 mcg/mL   ciprofloxacin Resistant >2 mcg/mL   ertapenem Sensitive <=0.5 mcg/mL   gentamicin Sensitive <=4 mcg/mL   meropenem Sensitive <=1 mcg/mL   piperacillin-tazobactam Sensitive <=16 mcg/mL   trimethoprim-sulfamethoxazole Sensitive <=2/38 mcg/mL     Enterococcus  faecalis   Antibiotic Interpretation ISAAC   ampicillin Sensitive <=2 mcg/mL   vancomycin Sensitive 2 mcg/mL       IMAGING:  10/3 L foot MRI  Impression   Osteomyelitis involving the fifth metatarsal and fifth proximal phalanx with extensive osseous destruction. Mild osteomyelitis involving the lateral aspect of the cuboid. Prior partial first digit amputation     10/3 R foot MRI   Impression   Multiple prior amputations. Soft tissue ulceration lateral to the cuboid with mild acute osteomyelitis involving the lateral base of the fourth metatarsal and more distal plantar aspect of the remaining fourth metatarsal shaft as well as the lateral aspect of the cuboid.        Scheduled Meds:   furosemide  20 mg Oral Daily    meropenem  1,000 mg IntraVENous Q12H    insulin glargine  12 Units SubCUTAneous Nightly    insulin lispro  0-18 Units SubCUTAneous TID     insulin lispro  0-9 Units SubCUTAneous Nightly    vancomycin (VANCOCIN) intermittent dosing (placeholder) Other See Admin Instructions    fluconazole  200 mg IntraVENous Q24H    amitriptyline  100 mg Oral Nightly    aspirin EC  81 mg Oral Daily    atorvastatin  20 mg Oral Nightly    doxazosin  4 mg Oral BID    [Held by provider] apixaban  5 mg Oral BID    escitalopram  10 mg Oral Daily    gabapentin  400 mg Oral BID    methadone  140 mg Oral Daily    metoprolol succinate  100 mg Oral Daily    pantoprazole  40 mg Oral Daily    sodium chloride flush  5-40 mL IntraVENous 2 times per day    influenza virus vaccine  0.5 mL IntraMUSCular Prior to discharge       Continuous Infusions:   sodium chloride      dextrose      sodium chloride 25 mL (10/07/21 0517)       PRN Meds:  sodium chloride, hydrALAZINE, glucose, dextrose, glucagon (rDNA), dextrose, heparin (porcine), heparin (porcine), albuterol, sodium chloride flush, sodium chloride, ondansetron **OR** ondansetron, polyethylene glycol, acetaminophen **OR** acetaminophen      Assessment:     Obesity (BMI 37), DM, neuropathy, HTN, HL, CKD, chronic pain  Hx DM foot infection / surgeries - has R TMA, L hallux resection    L foot wound infection / osteomyelitis   - MRI with 5th MT + prox 5th phalanx destruction, lateral cuboid edema, reviewed images with Radiologist 10/4    R foot wound infection / osteomyelitis    - MRI with cuboid, 4th MT OM, reviewed images with Radiologist 10/4    Cult polymicrobial with Ps aerug (R cipro), E coli, E faecalis    Plan:     Cont vancomycin, meropenem    Wound care and surgical intervention per Podiatry  Plan for b/l I&D with R 4th ray and cuboid resection, L fifth ray and cuboid resection - OR today    Anticipate need PICC and iv antibiotics after discharge    Medical Decision Making:   The following items were considered in medical decision making:  Discussion of patient care with other providers  Reviewed clinical lab tests  Reviewed radiology tests  Reviewed other diagnostic tests/interventions  Independent review of radiologic images - reviewed MRI with Radiologist 10/4  Microbiology cultures and other micro tests reviewed      Discussed with pt, Podiatry Resident  Marti Coates MD

## 2021-10-07 NOTE — BRIEF OP NOTE
Brief Postoperative Note      Patient: Abraham Garcia  YOB: 1965  MRN: [id number]    Date of Procedure: 10/7/2021    Pre-Op Diagnosis: Osteomyelitis bilateral feet    Post-Op Diagnosis: Same       Procedure(s):  BILATERAL LOWER EXTREMITY INCISION AND DRAINAGE, RIGHT FOOT 4TH RAY RESECTION, LEFT FOOT 5TH RAY RESECTION, RIGHT FOOT PARTIAL CUBOID RESECTION    Surgeon(s):  Daniel Antunez DPM    Assistant:  Resident: Loco Mora DPM; Madison Contreras DPM  Student: Jean Sue MS4    Anesthesia: General, with bilateral popliteal blocks preoperatively    Injectables: 4 cc AmniFlo    Materials: SurgiFoam, 2-0 nylon    Hemostasis: Electrocautery    Estimated Blood Loss (mL): 940     Complications: None    Specimens:   ID Type Source Tests Collected by Time Destination   1 : RIGHT FOOT ULCER Tissue Tissue CULTURE, FUNGUS, CULTURE, TISSUE, CULTURE WITH SMEAR, ACID FAST BACILLIUS Daniel Antunez DPM 10/7/2021 1427    A : RIGHT FOOT 4TH METATARSAL Tissue Tissue SURGICAL PATHOLOGY Daniel Antunez DPM 10/7/2021 1408    B : LEFT FOOT 5TH DIGIT Tissue Tissue SURGICAL PATHOLOGY JERED García 10/7/2021 1412    C : RIGHT FOOT CUBOID Tissue Tissue SURGICAL PATHOLOGY JERED García 10/7/2021 1413    D : RIGHT FOOT CUBOID CLEARANCE FRAGMENT Tissue Tissue SURGICAL PATHOLOGY JERED García 10/7/2021 1417    E : LEFT FOOT 5TH METATARSAL Tissue Tissue SURGICAL PATHOLOGY JERED García 10/7/2021 1419    F : LEFT FOOT CUBOID CLEARANCE FRAGMENT Tissue Tissue SURGICAL PATHOLOGY JERED García 10/7/2021 1421        Implants:  * No implants in log *      Drains: * No LDAs found *    Findings: No purulence noted bilateral.  Left foot: Fifth metatarsal necrotic; cuboid hard, shiny, white, consistent with healthy bone, biopsy of cuboid taken for pathology. Right foot: Fourth metatarsal necrotic, distal aspect of cuboid necrotic, clearance fragment taken of the cuboid for pathology.     DISPO: S/p bilateral lower extremity I&D with fifth ray resection left foot, fourth ray resection right foot, partial cuboid resection right foot. Patient will be brought back to the OR for delayed primary closure this hospital admission pending biopsy results. Continue antibiotics per ID recommendations. Will follow up on bone biopsies for bilateral lower extremity.     Electronically signed by Kimberly Garcia DPM on 10/7/2021 at 2:59 PM

## 2021-10-07 NOTE — ANESTHESIA POSTPROCEDURE EVALUATION
Department of Anesthesiology  Postprocedure Note    Patient: Rosenda Miranda  MRN: [id number]  YOB: 1965  Date of evaluation: 10/7/2021  Time:  5:03 PM     Procedure Summary     Date: 10/07/21 Room / Location: [facility]    Anesthesia Start: 1329 Anesthesia Stop: 4442    Procedure: BILATERAL LOWER EXTREMITY INCISION AND DRAINAGE, RIGHT FOOT 4TH RAY RESECTION, LEFT FOOT 5TH RAY RESECTION (Foot) Diagnosis: (osteomylytis bilateral feet)    Surgeons: Cassidy Braden DPM Responsible Provider: Olimpia Bailey MD    Anesthesia Type: general, regional ASA Status: 3          Anesthesia Type: general, regional    Norma Phase I: Norma Score: 8    Norma Phase II:      Last vitals: Reviewed and per EMR flowsheets.        Anesthesia Post Evaluation    Patient location during evaluation: PACU  Patient participation: complete - patient participated  Level of consciousness: awake  Pain score: 2  Airway patency: patent  Nausea & Vomiting: no nausea and no vomiting  Complications: no  Cardiovascular status: hemodynamically stable  Respiratory status: acceptable  Hydration status: euvolemic

## 2021-10-07 NOTE — PROGRESS NOTES
PACU Transfer Note    Vitals:    10/07/21 1615   BP: (!) 109/55   Pulse: 58   Resp: 12   Temp: 96.4 °F (35.8 °C)   SpO2: 97%       No intake/output data recorded.     Pain assessment:   Pain Level: 0    Report given to Receiving unit RN.    10/7/2021 4:45 PM

## 2021-10-07 NOTE — PROGRESS NOTES
Pt to OR. Tried 3 times to get repeat blood pressure, however pt kept moving her arm and machine was unable to obtain it and transporters had arrived, so 10mg hydralazine not given. Upper dentures left at bedside.

## 2021-10-07 NOTE — PROGRESS NOTES
Progress Note    Admit Date: 10/1/2021  Day: 6  Diet: Diet NPO Exceptions are: Sips of Water with Meds    CC: B/L leg pain for 2-6 weeks    Interval history:   NAONE. Patient seen and examined this am. Patient has no complaints. Reporting no SOB, CP, palpitations, N/V, abdominal pain, c/d, urinary symptoms. Has been hypertensive, otherwise HDS. Going to OR today. HPI:   Jordan FranksSherry Morales is a 61 yo F with PMH CKD4, DVT (2004; on Eqliquis), T2DM c/b b/l lower extremity ulcers (follows with Podiatry outpatient, last visit 9/27/21), HTN, narcotic-dependent chronic pain (on Methadone 140 mg/day) who presented for progressively worsening b/l lower extremity pain for the 2 weeks. Patient states she presented today with pain in legs so severe that it is now inhibiting her ability to ambulate. She endorses that the legs are also more swollen and warm to touch with pain when she touches it. She endorses she tries to eat a healthy diet low in salt, she also denies any major weight fluctuations recently.     On ROS, denies fever/chills, nausea/vomiting, diarrhea/constipation, urinary issues including dysuria, hematuria, or changes to amount or flow of urine. She denies abdominal pain.      On arrival to [facility] ED, VSS. Patient appeared extremely sleepy on physical exam. Chemistry significant for Na 133, BUN 42/Cr 2.5. Pro-BNP elevated 4,733 (baseline 2-3k). Trops 0.04. CBC with Hgb 9.1 Hct 28.5 at baseline. CXR with mild cardiomegaly and interstitial edema.  She was given IV Lasix 40 in the ED and admitted to the floor.        Medications:     Scheduled Meds:   lidocaine 1 % injection  5 mL IntraDERmal Once    sodium chloride flush  5-40 mL IntraVENous 2 times per day    hydrALAZINE  10 mg IntraVENous Once    bupivacaine (PF)        fentaNYL        midazolam        vancomycin  750 mg IntraVENous Q24H    furosemide  20 mg Oral Daily    meropenem  1,000 mg IntraVENous Q12H    insulin glargine  12 Units SubCUTAneous Nightly  insulin lispro  0-18 Units SubCUTAneous TID     insulin lispro  0-9 Units SubCUTAneous Nightly    fluconazole  200 mg IntraVENous Q24H    amitriptyline  100 mg Oral Nightly    aspirin EC  81 mg Oral Daily    atorvastatin  20 mg Oral Nightly    doxazosin  4 mg Oral BID    [Held by provider] apixaban  5 mg Oral BID    escitalopram  10 mg Oral Daily    gabapentin  400 mg Oral BID    methadone  140 mg Oral Daily    metoprolol succinate  100 mg Oral Daily    pantoprazole  40 mg Oral Daily    sodium chloride flush  5-40 mL IntraVENous 2 times per day    influenza virus vaccine  0.5 mL IntraMUSCular Prior to discharge     Continuous Infusions:   sodium chloride      sodium chloride      dextrose      sodium chloride 25 mL (10/07/21 0517)     PRN Meds:sodium chloride, sodium chloride flush, sodium chloride, labetalol, hydrALAZINE, glucose, dextrose, glucagon (rDNA), dextrose, heparin (porcine), heparin (porcine), albuterol, sodium chloride flush, sodium chloride, ondansetron **OR** ondansetron, polyethylene glycol, acetaminophen **OR** acetaminophen    Objective:   Vitals:   T-max:  Patient Vitals for the past 8 hrs:   BP Temp Temp src Pulse Resp SpO2   10/07/21 1118 (!) 183/74 97.5 °F (36.4 °C) Oral 61 16 94 %   10/07/21 1102 (!) 179/79 97.5 °F (36.4 °C) Oral 64 16 96 %   10/07/21 1040 (!) 191/83 97.4 °F (36.3 °C) Oral 61 16 94 %   10/07/21 0945 (!) 179/72 97.5 °F (36.4 °C) Oral 60 16 94 %   10/07/21 0858 (!) 179/75 97.7 °F (36.5 °C) Oral 59 16 94 %   10/07/21 0851 (!) 173/68 97.8 °F (36.6 °C) Oral 60 16 93 %   10/07/21 0846 (!) 178/67 97.4 °F (36.3 °C) Oral 62 16 94 %   10/07/21 0840 (!) 175/75 97.6 °F (36.4 °C) Oral 60 16 98 %   10/07/21 0828 (!) 186/74 97.6 °F (36.4 °C) Oral 61 16 94 %   10/07/21 0733 117/79 97.5 °F (36.4 °C) Oral 62 14 94 %       Intake/Output Summary (Last 24 hours) at 10/7/2021 1318  Last data filed at 10/7/2021 1102  Gross per 24 hour   Intake 599 ml   Output 700 ml   Net -101 ml     ROS as above    Physical Exam  Physical Exam  Constitutional:       General: She is not in acute distress. HENT:      Head: Normocephalic and atraumatic. Nose: No congestion or rhinorrhea. Mouth/Throat:      Mouth: Mucous membranes are moist.      Pharynx: No oropharyngeal exudate or posterior oropharyngeal erythema. Eyes:      General: No scleral icterus. Conjunctiva/sclera: Conjunctivae normal.   Cardiovascular:      Rate and Rhythm: Normal rate and regular rhythm. Heart sounds: No murmur heard. No gallop. Pulmonary:      Effort: No respiratory distress. Breath sounds: No wheezing or rales. Abdominal:      General: There is no distension. Palpations: Abdomen is soft. Tenderness: There is no abdominal tenderness. There is no guarding. Musculoskeletal:      Cervical back: Neck supple. No tenderness. Right lower leg: Edema present. Left lower leg: Edema present. Comments: Chronic venous changes on b/l LE, mild tenderness, significant swelling. B/L feet with wounds, not purulent    Skin:     General: Skin is dry. Capillary Refill: Capillary refill takes less than 2 seconds. Neurological:      General: No focal deficit present. Mental Status: She is alert and oriented to person, place, and time. LABS:    CBC:   Recent Labs     10/05/21  0307 10/05/21  0307 10/06/21  0849 10/07/21  0520 10/07/21  1157   WBC 7.7  --  5.3 6.2  --    HGB 7.9*   < > 7.2* 6.9* 8.4*   HCT 24.9*   < > 22.9* 21.3* 25.5*     --  356 348  --    MCV 81.4  --  81.4 80.8  --     < > = values in this interval not displayed.      Renal:    Recent Labs     10/05/21  0307 10/06/21  0849 10/07/21  0520   * 137 140   K 5.1 4.9 4.6    102 105   CO2 25 27 27   BUN 45* 38* 37*   CREATININE 3.0* 2.3* 1.9*   GLUCOSE 157* 148* 208*   CALCIUM 8.7 8.9 8.6   MG 2.00 2.00 1.90   ANIONGAP 9 8 8     Hepatic:   No results for input(s): AST, ALT, BILITOT, BILIDIR, PROT, LABALBU, ALKPHOS in the last 72 hours. Troponin:   No results for input(s): TROPONINI in the last 72 hours. BNP: No results for input(s): BNP in the last 72 hours. Lipids: No results for input(s): CHOL, HDL in the last 72 hours. Invalid input(s): LDLCALCU, TRIGLYCERIDE  ABGs:  No results for input(s): PHART, KMC5IEW, PO2ART, QUF5YME, BEART, THGBART, I0UAKVPF, IXU1JYD in the last 72 hours. INR:   Recent Labs     10/05/21  1154   INR 0.98     Lactate: No results for input(s): LACTATE in the last 72 hours. Cultures:  -----------------------------------------------------------------  RAD:   VL Extremity Venous Bilateral   Final Result      IR TUNNELED CVC PLACE WO SQ PORT/PUMP > 5 YEARS   Final Result   Impression:      Successful placement of a tunneled central venous catheter via the right internal jugular vein. The catheter is ready for immediate use. VL DUP LOWER EXTREMITY ARTERIES BILATERAL   Final Result      MRI FOOT LEFT WO CONTRAST   Final Result   Osteomyelitis involving the fifth metatarsal and fifth proximal phalanx with extensive osseous destruction. Mild osteomyelitis involving the lateral aspect of the cuboid. Prior partial first digit amputation. MRI FOOT RIGHT WO CONTRAST   Final Result   Multiple prior amputations. Soft tissue ulceration lateral to the cuboid with mild acute osteomyelitis involving the lateral base of the fourth metatarsal and more distal plantar aspect of the remaining fourth metatarsal shaft as well as the lateral aspect of the cuboid. XR FOOT LEFT (MIN 3 VIEWS)   Final Result      Numerous osteotomies with soft tissue swelling and possible ulcer lateral aspect of the right midfoot. No plain radiographic evidence for osteomyelitis, however plain film findings for osteomyelitis are often late findings.             3 views left foot      FINDINGS:      There is a mottled appearance of the phalanges of the right fifth digit as well as the fifth metatarsal. The remaining metatarsals unremarkable in appearance. Patient's had prior osteotomy of the first distal phalangeal tuft. IMPRESSION:      Plain radiographic evidence for osteomyelitis involving the fifth phalanges and fifth metatarsal. Patient's bones are osteopenic. XR FOOT RIGHT (MIN 3 VIEWS)   Final Result      Numerous osteotomies with soft tissue swelling and possible ulcer lateral aspect of the right midfoot. No plain radiographic evidence for osteomyelitis, however plain film findings for osteomyelitis are often late findings. 3 views left foot      FINDINGS:      There is a mottled appearance of the phalanges of the right fifth digit as well as the fifth metatarsal. The remaining metatarsals unremarkable in appearance. Patient's had prior osteotomy of the first distal phalangeal tuft. IMPRESSION:      Plain radiographic evidence for osteomyelitis involving the fifth phalanges and fifth metatarsal. Patient's bones are osteopenic. XR CHEST PORTABLE   Final Result      Mild cardiomegaly and mild interstitial edema. Assessment/Plan:      Cahno Ashwin Deirdresalenatom Moses is a 61 yo F with PMH CKD4, DVT (on Eqliquis), T2DM c/b b/l lower extremity ulcers (follows with Podiatry outpatient, last visit 9/27/21), HTN, history of pain medication addiction (on Methadone 140 mg/day) who presented for progressively worsening b/l lower extremity pain for the 2 weeks. Pre-operative evaluation  Plan to take patient down for bilateral LE I&D, R foot 4th ray resection and L foot 5th ray resection tomorrow with podiatry. Patient denies any SOB, CP, orthopnea or PND. She has no JVD or crackles on exam.   - RCRI showed Class IV Risk, 15% 30-day risk of death, MI, or cardiac arrest.   - Benefits of surgical intervention outweigh risk, okay to proceed with surgical intervention with general anesthesia.   - Was placed NPO midnight and heparin ggt was held    Osteomyelitis of b/l feet  Recently debrided outpatient on 9/27/21, do not appear acutely infected. Patient on Clinda and Cipro. XR of both feet show osteomyelitis involving 5th phalanges and fifth metatarsal.   - Podiatry following  - wound care per Podiatry  - PICC line placed for abx on discharge  - Wound cultures grew pseudomonas and enterococcus  - Antibiotics per ID, vanc, merem, fluconazole    Chronic diastolic Heart Failure  Last Echo 2018 with EF 50-55%. Evidence of PAH which may be contributing. CXR 10/1 with mild cardiomegaly and interstitial edema. Suspect rt heart failure from undiagnosed sleep apnea. Echo with EF 55%, LVH and estimated pulm artery systolic pressure is at 60 mmHg   - Lasix 20 mg IV daily  - strict I/O's, daily weights   - Low salt diet    FAHEEM on CKD 4 (improving)  Baseline creat is appears around 2. Hypoalbuminemia, Albumin 2.6 on presentation. Suspect from diabetic nephropathy in setting of poorly controlled diabetes. HbA1c 10/2021 at 8.3  - Improving, 1.9 today, monitor renal function closely  - Monitor I/Os  - Avoid nephrotoxic agents  - Nephrology following    Hypertension  - Continue home meds  - PRN hydralyzine     Hyponatremia (resolved)  Na 133 on admission.  - continue to monitor      Anemia of Chronic Anemia   Likely 2/2  Anemia of chronic disease from chronic kidney disease. Iron 16, iron sat 9, transferrin 7.8, TIBC 174  - Monitor daily CBC, stable     T2DM   BS 91 - recent outpatient visit records show good blood sugar control. HbA1c 6.8 on 06/17/21.   - HbA1c on  10/2021 at 8.3  - on wt loss, exercise, good diet and medication compliance     Hx of Opioid Pain Medication Use, Now on Methadone   - Continue Methadone 140 mg daily         Code Status: Full Code   FEN: Diet NPO Exceptions are: Sips of Water with Meds   PPX: heparin gtt held for surgery  DISPO: Angela Trevino MD, PGY-1  10/07/21  1:18 PM    This patient has been staffed and discussed with Viki Tompkins MD.

## 2021-10-07 NOTE — ANESTHESIA PRE PROCEDURE
Department of Anesthesiology  Preprocedure Note       Name:  Nicholas Moya   Age:  60 y.o.  :  1965                                          MRN:  [id number]         Date:  10/7/2021      Surgeon: Miriam White):  Zan You DPM    Procedure: Procedure(s):  BILATERAL LOWER EXTREMITY INCISION AND DRAINAGE, RIGHT FOOT 4TH RAY RESECTION, LEFT FOOT 5TH RAY RESECTION  . Medications prior to admission:   Prior to Admission medications    Medication Sig Start Date End Date Taking? Authorizing Provider   gabapentin (NEURONTIN) 400 MG capsule Take 1 capsule by mouth 2 times daily for 90 days. 21 Yes Mariana Kendrick MD   doxazosin (CARDURA) 4 MG tablet Take 1 tablet by mouth 2 times daily 21  Yes Mariana Kendrick MD   ELIQUIS 5 MG TABS tablet TAKE 1 TABLET BY MOUTH TWICE DAILY 21  Yes Mariana Kendrick MD   escitalopram (LEXAPRO) 10 MG tablet Take 1 tablet by mouth daily 21  Yes Jessica Aguilar MD   omeprazole (PRILOSEC) 20 MG delayed release capsule Take 1 capsule by mouth every morning 21  Yes Mariana Kendrick MD   furosemide (LASIX) 20 MG tablet Take 1 tablet by mouth 2 times daily 21  Yes Mariana Kendrick MD   methadone (DOLOPHINE) 10 MG/ML solution Take 140 mg by mouth daily. Verified dose with orly [facility] [phone]) 21   Yes Historical Provider, MD   amitriptyline (ELAVIL) 100 MG tablet TAKE 1 TABLET BY MOUTH EVERY NIGHT AT BEDTIME 6/15/21  Yes Newton Paulino MD   metoprolol succinate (TOPROL XL) 100 MG extended release tablet Take 1 tablet by mouth daily 21  Yes Abida Palafox MD   atorvastatin (LIPITOR) 20 MG tablet Take 1 tablet by mouth nightly 21  Yes Mariana Kendrick MD   ammonium lactate (LAC-HYDRIN) 12 % lotion Apply topically daily. Patient taking differently: Apply topically as needed Apply topically daily.  3/24/21  Yes Moody Love MD   insulin glargine (BASAGLAR KWIKPEN) 100 UNIT/ML injection pen Inject 50 Units into the skin daily 20  Yes Rashad Golden MD   insulin aspart (NOVOLOG FLEXPEN) 100 UNIT/ML injection pen Inject 8 Units into the skin 3 times daily (before meals)  Patient taking differently: Inject 8 Units into the skin 3 times daily (before meals) Uses as sliding scale per B-250 = 2 units  251-300 = 4 units  301-350 = 6 units  351-400 = 8 units  >400 = 10 units 20  Yes Ventura Hubbard MD   nystatin (MYCOSTATIN) 185077 UNIT/GM powder Apply topically 2 times daily Apply to right breast and groin area BID 20  Yes Joelle Rothman MD   aspirin EC 81 MG EC tablet Take 1 tablet by mouth daily 20  Yes Joelle Rothman MD   albuterol sulfate  (90 Base) MCG/ACT inhaler Inhale 2 puffs into the lungs every 6 hours as needed for Wheezing  Patient taking differently: Inhale 2 puffs into the lungs every 6 hours as needed for Wheezing  20  Yes Emma Rivas MD   blood glucose test strips (TRUE METRIX BLOOD GLUCOSE TEST) strip 1 each by In Vitro route 2 times daily As needed.  20  Joelle Rothman MD   Insulin Pen Needle 31G X 5 MM MISC 1 each by Does not apply route daily 20   Joelle Rothman MD   Lancets MISC 1 each by Does not apply route 2 times daily PHARMACY MAY SUBSTITUTE TO TRUE METRIX LANCETS 20   Joelle Rothman MD   Gauze Pads & Dressings MISC Please dispense 4x8 guaze, kerlix, and ace 18   Lisa Quinones DPM       Current medications:    Current Facility-Administered Medications   Medication Dose Route Frequency Provider Last Rate Last Admin    0.9 % sodium chloride infusion   IntraVENous PRN Alistair Rossi MD        lidocaine PF 1 % injection 5 mL  5 mL IntraDERmal Once Keenan De Leon MD        sodium chloride flush 0.9 % injection 5-40 mL  5-40 mL IntraVENous 2 times per day Keenan De Leon MD   10 mL at 10/07/21 1153    sodium chloride flush 0.9 % injection 5-40 mL  5-40 mL IntraVENous PRN Keenan De Leon MD        0.9 % sodium chloride infusion  25 mL IntraVENous PRN Mo Alfaro MD        labetalol (NORMODYNE;TRANDATE) injection syringe 10 mg  10 mg IntraVENous Q4H PRN Shraddha Garza MD        hydrALAZINE (APRESOLINE) injection 5 mg  5 mg IntraVENous Q4H PRN Shraddha Garza MD        hydrALAZINE (APRESOLINE) injection 10 mg  10 mg IntraVENous Once Blanche MD Malena        bupivacaine (PF) (MARCAINE) 0.25 % injection             fentaNYL (SUBLIMAZE) 100 MCG/2ML injection             midazolam (VERSED) 2 MG/2ML injection             vancomycin (VANCOCIN) 750 mg in dextrose 5 % 250 mL IVPB  750 mg IntraVENous Q24H Josi Menendez RPH        furosemide (LASIX) tablet 20 mg  20 mg Oral Daily Fidelina Moncada MD   20 mg at 10/07/21 0906    meropenem (MERREM) 1,000 mg in sodium chloride 0.9 % 100 mL IVPB (mini-bag)  1,000 mg IntraVENous Q12H Campbell Buenrostro MD   Stopped at 10/07/21 0627    glucose (GLUTOSE) 40 % oral gel 15 g  15 g Oral PRN Mariam King MD        dextrose 50 % IV solution  12.5 g IntraVENous PRN Elsa Mariscal MD        glucagon (rDNA) injection 1 mg  1 mg IntraMUSCular PRN Mariam King MD        dextrose 5 % solution  100 mL/hr IntraVENous PRN Mariam King MD        insulin glargine (LANTUS;BASAGLAR) injection pen 12 Units  12 Units SubCUTAneous Nightly Shraddha Garza MD   12 Units at 10/06/21 2234    insulin lispro (1 Unit Dial) 0-18 Units  0-18 Units SubCUTAneous TID WC Shraddha Garza MD   3 Units at 10/06/21 1740    insulin lispro (1 Unit Dial) 0-9 Units  0-9 Units SubCUTAneous Nightly Shraddha Garza MD   3 Units at 10/06/21 0011    heparin (porcine) injection 8,050 Units  80 Units/kg IntraVENous PRN Shraddha Garza MD   8,050 Units at 10/03/21 2106    heparin (porcine) injection 4,020 Units  40 Units/kg IntraVENous PRN Shraddha Garza MD        fluconazole (DIFLUCAN) 200 mg IVPB  200 mg IntraVENous Q24H Campbell Buenrostro  mL/hr at 10/06/21 1740 200 mg at 10/06/21 1748    albuterol (PROVENTIL) nebulizer solution 2.5 mg  2.5 mg Nebulization Q6H PRN Rola Galvez MD        amitriptyline (ELAVIL) tablet 100 mg  100 mg Oral Nightly Rola Galvez MD   100 mg at 10/06/21 2123    aspirin EC tablet 81 mg  81 mg Oral Daily Rola Galvez MD   81 mg at 10/07/21 0905    atorvastatin (LIPITOR) tablet 20 mg  20 mg Oral Nightly Rola Galvez MD   20 mg at 10/06/21 2124    doxazosin (CARDURA) tablet 4 mg  4 mg Oral BID Rola Galvez MD   4 mg at 10/07/21 0906    [Held by provider] apixaban (ELIQUIS) tablet 5 mg  5 mg Oral BID Rola Galvez MD   5 mg at 10/02/21 2133    escitalopram (LEXAPRO) tablet 10 mg  10 mg Oral Daily Rola Galvez MD   10 mg at 10/07/21 0906    gabapentin (NEURONTIN) capsule 400 mg  400 mg Oral BID Rola Galvez MD   400 mg at 10/07/21 0905    methadone (DOLOPHINE) tablet 140 mg  140 mg Oral Daily Rola Galvez MD   140 mg at 10/07/21 7747    metoprolol succinate (TOPROL XL) extended release tablet 100 mg  100 mg Oral Daily Rola Galvez MD   100 mg at 10/07/21 0906    pantoprazole (PROTONIX) tablet 40 mg  40 mg Oral Daily Rola Galvez MD   40 mg at 10/07/21 0906    sodium chloride flush 0.9 % injection 5-40 mL  5-40 mL IntraVENous PRN Rola Galvez MD        0.9 % sodium chloride infusion  25 mL IntraVENous PRN Rola Galvez  mL/hr at 10/07/21 0517 25 mL at 10/07/21 0517    ondansetron (ZOFRAN-ODT) disintegrating tablet 4 mg  4 mg Oral Q8H PRN Rola Galvez MD        Or    ondansetron (ZOFRAN) injection 4 mg  4 mg IntraVENous Q6H PRN Rola Galvez MD        polyethylene glycol (GLYCOLAX) packet 17 g  17 g Oral Daily PRN Rola Galvez MD        acetaminophen (TYLENOL) tablet 650 mg  650 mg Oral Q6H PRN Rola Galvez MD   650 mg at 10/06/21 1244    Or    acetaminophen (TYLENOL) suppository 650 mg  650 mg Rectal Q6H PRN Rola Galvez MD        sodium chloride flush 0.9 % injection 5-40 mL  5-40 mL IntraVENous 2 times per day Leticia Bustos MD   10 mL at 10/07/21 0906    influenza quadrivalent split vaccine (FLUZONE;FLUARIX;FLULAVAL;AFLURIA) injection 0.5 mL  0.5 mL IntraMUSCular Prior to discharge Juan White MD           Allergies: Allergies   Allergen Reactions    Sulfa Antibiotics Hives, Itching and Rash       Problem List:    Patient Active Problem List   Diagnosis Code    Feet clawing Q66.89    Diabetic neuropathy ([address] E11.40    Hyperlipidemia E78.5    HTN (hypertension) I10    Amenorrhea N91.2    Dyspareunia [id number]    Neuropathy G62.9    Bacterial vaginosis N76.0, B96.89    Low back pain M54.50    Anomalies of nails Q84.6    Tobacco abuse Z72.0    Carpal tunnel syndrome G56.00    Scalp lesion L98.9    ETOH abuse F10.10    Pseudocyst, pancreas K86.3    Asthma J45.909    Nicotine addiction F17.200    Hypoxia R09.02    Onychomycosis B35.1    Depression F32. A    Anxiety state F41.1    Tinea pedis B35.3    Burn T30.0    Obesity (BMI 30-39. 9) E66.9    S/P foot surgery, right Z98.890    Open wound of left foot with complication W82.035B    Cellulitis L03.90    Bilateral lower extremity edema R60.0    Chronic multifocal osteomyelitis of left foot (HCC) Y80.405    Acute systolic congestive heart failure (HCC) I50.21    Acute osteomyelitis of metatarsal bone of right foot (HCC) M86.171    Bronchitis J40    Cellulitis and abscess of foot L03.119, L02.619    Group B streptococcal infection A49.1    Tendonitis, Achilles, right M76.61    Diabetic ulcer of right midfoot associated with type 2 diabetes mellitus, limited to breakdown of skin (HCC) E11.621, L97.411    Opiate dependence ([facility]) F11.20    Class 2 obesity due to excess calories with body mass index (BMI) of 37.0 to 37.9 in adult E66.09, Z68.37    CKD (chronic kidney disease), stage III ([facility]) N18.30    Diabetic foot infection (HCC) E11.628, L08.9    Chronic osteomyelitis of right foot with draining sinus ([facility]) M86.471    FAHEEM (acute kidney injury) ([facility] N17.9    Acute blood loss anemia D62    Aspiration pneumonitis ([facility]) J69.0    Altered mental status R41.82    Chronic systolic heart failure ([facility]) I50.22    Gastroesophageal reflux disease K21.9    Type 2 diabetes mellitus with right diabetic foot infection ([facility]) E11.628, Y08.4    Systolic CHF, acute ([facility]) I50.21    Type 2 diabetes mellitus with left diabetic foot infection ([facility]) E11.628, L08.9    Lymphedema of left leg I89.0    Lymphedema of right lower extremity I89.0    Charcot's joint of ankle M14.679    Elevated sed rate R70.0    Elevated C-reactive protein (CRP) R79.82    History of transmetatarsal amputation of right foot ([facility] Z89.431    History of alcoholism ([facility] F10.21    Ex-smoker J05.563    History of MRSA infection Z86.14    CKD (chronic kidney disease) stage 4, GFR 15-29 ml/min ([facility]) N18.4       Past Medical History:        Diagnosis Date    Asthma 2004    Bacterial vaginosis 2008    Carpal tunnel syndrome 2007    COPD (chronic obstructive pulmonary disease) ([facility]     Diabetes mellitus type II 2007    10/1/20 pt states does accucheck 2x/day at home    Diabetic neuropathy ([facility]     Diastolic CHF ([facility])     DVT (deep venous thrombosis) ([facility] 2004    Dyslipidemia 2009    Dyspareunia 2009    ETOH abuse 2007    Feet clawing     HTN (hypertension)     Hx of blood clots     Hyperlipidemia     MRSA (methicillin resistant staph aureus) culture positive 2017; 2017    foot; leg     Neuropathy 2009    polyneuropathy    Pancreatitis 2004    Scalp lesion 2007    Tobacco abuse 2008    Uses walker     Uses wheelchair     also uses walker    Wears dentures        Past Surgical History:        Procedure Laterality Date     SECTION  unknown    FOOT DEBRIDEMENT Right 2019    INCISION AND DRAINAGE WITH APPLICATION OF STRAVIX GRAFT RIGHT FOOT performed by Cassidy Braden DPM at [address] Right 6/6/2019    RIGHT FOOT INCISION AND DRAINAGE WITH STAGING TRANSMETATARSAL AMPUTATION performed by Cassidy Braden DPM at [address] Right 7/5/2019    RIGHT FOOT DEBRIDEMENT INCISION AND DRAINAGE, OPEN DIABETIC FOOT ULCER WITH GRAFT PLACEMENT performed by Cassidy Braden DPM at [address] Left 10/23/2019    LEFT FOOT INCISION AND DRAINAGE , DEBRIDEMENT OF OPEN WOUND, APPLICATION OF STRAVIX GRAFT performed by Cassidy Braden DPM at [address] Right 3/29/2021    INCISION AND DRAINAGE, DEBRIDEMENT OF DIABETIC WOUND WITH PLACEMENT OF STRAVIX GRAFT RIGHT FOOT performed by Cassidy Braden DPM at [address] IR TUNNELED [address] 5 YEARS  10/5/2021    IR TUNNELED CATHETER PLACEMENT GREATER THAN 5 YEARS 10/5/2021 [facility] SPECIAL PROCEDURES    KNEE SURGERY Left     from falling off ladder -- has screws in place pt report    OTHER SURGICAL HISTORY Left 05/25/2016    I & D left foot    OTHER SURGICAL HISTORY Right 10/20/2017    RIGHT GASTROC LENGTHENING ENDOSCOPIC, INJECTION OF AMNI GRAFT    OTHER SURGICAL HISTORY Right 04/26/2018    Diabetic foot ulcer I&D w/ integra graft application    CO DEBRIDEMENT, SKIN, SUB-Q TISSUE,MUSCLE,BONE,=<20 SQ CM Right 8/17/2018    RIGHT FOOT DEBRIDEMENT INCISION AND DRAINAGE, PARTIAL 5TH RAY AMPUTATION performed by Cassidy Braden DPM at [address] PRE-MALIG[place]NT / [address]  8/2889    cryotherapy done on lesion    TOE AMPUTATION Left 02/24/2017    AMPUTATION LEFT GREAT TOE                 TONSILLECTOMY         Social History:    Social History     Tobacco Use    Smoking status: Former Smoker     Packs/day: 1.00     Years: 30.00     Pack years: 30.00     Types: Cigarettes     Quit date: 6/1/2018     Years since quitting: 3.3    Smokeless tobacco: Never Used    Tobacco comment: PER PT USES A NICOTINE VAPE NOW AND NO CIGARETTES   Substance Use Topics    Alcohol use: No     Alcohol/week: 4.0 - 5.0 standard drinks     Types: 4 - 5 Standard drinks or equivalent per week     Comment: hx of etoh abuse, denies recent etoh use; last drink 2 years ago                                Counseling given: Not Answered  Comment: PER PT USES A NICOTINE VAPE NOW AND NO CIGARETTES      Vital Signs (Current):   Vitals:    10/07/21 0945 10/07/21 1040 10/07/21 1102 10/07/21 1118   BP: (!) 179/72 (!) 191/83 (!) 179/79 (!) 183/74   Pulse: 60 61 64 61   Resp: 16 16 16 16   Temp: 97.5 °F (36.4 °C) 97.4 °F (36.3 °C) 97.5 °F (36.4 °C) 97.5 °F (36.4 °C)   TempSrc: Oral Oral Oral Oral   SpO2: 94% 94% 96% 94%   Weight:       Height:                                                  BP Readings from Last 3 Encounters:   10/07/21 (!) 183/74   08/12/21 (!) 179/69   06/21/21 (!) 163/69       NPO Status:                                                                                 BMI:   Wt Readings from Last 3 Encounters:   10/07/21 203 lb 4.2 oz (92.2 kg)   08/12/21 200 lb (90.7 kg)   06/18/21 218 lb 0.6 oz (98.9 kg)     Body mass index is 37.18 kg/m².     CBC:   Lab Results   Component Value Date    WBC 6.2 10/07/2021    RBC 2.63 10/07/2021    HGB 8.4 10/07/2021    HCT 25.5 10/07/2021    MCV 80.8 10/07/2021    RDW 17.0 10/07/2021     10/07/2021       CMP:   Lab Results   Component Value Date     10/07/2021    K 4.6 10/07/2021    K 4.5 10/01/2021     10/07/2021    CO2 27 10/07/2021    BUN 37 10/07/2021    CREATININE 1.9 10/07/2021    GFRAA 33 10/07/2021    GFRAA 98 10/31/2012    AGRATIO 0.8 06/17/2021    LABGLOM 27 10/07/2021    GLUCOSE 208 10/07/2021    PROT 6.9 10/01/2021    PROT 6.6 07/21/2011    CALCIUM 8.6 10/07/2021    BILITOT <0.2 10/01/2021    ALKPHOS 131 10/01/2021    AST 16 10/01/2021    ALT 13 10/01/2021       POC Tests:   Recent Labs     10/07/21  1303   POCGLU 250*       Coags:   Lab Results Component Value Date    PROTIME 11.1 10/05/2021    INR 0.98 10/05/2021    APTT 61.3 10/05/2021       HCG (If Applicable):   Lab Results   Component Value Date    PREGTESTUR Negative 06/06/2019        ABGs:   Lab Results   Component Value Date    PHART 7.48 07/19/2011    PO2ART 59 07/19/2011    LTS6PKE 49 07/19/2011    VEY0YSW 36 07/19/2011    BEART 10.8 07/19/2011        Type & Screen (If Applicable):  No results found for: LABABO, LABRH    Drug/Infectious Status (If Applicable):  No results found for: HIV, HEPCAB    COVID-19 Screening (If Applicable):   Lab Results   Component Value Date    COVID19 Not Detected 03/25/2021           Anesthesia Evaluation  Patient summary reviewed and Nursing notes reviewed no history of anesthetic complications:   Airway: Mallampati: I  TM distance: <3 FB   Neck ROM: full  Mouth opening: > = 3 FB Dental:    (+) upper dentures and lower dentures      Pulmonary:normal exam    (+) COPD: mild,  decreased breath sounds,  asthma:                            Cardiovascular:  Exercise tolerance: poor (<4 METS),   (+) hypertension:, CHF:,       NYHA Classification: III  ECG reviewed  Rhythm: regular  Rate: normal  Echocardiogram reviewed                  Neuro/Psych:   Negative Neuro/Psych ROS  (+) neuromuscular disease:,             GI/Hepatic/Renal:             Endo/Other:    (+) DiabetesType II DM, no interval change, using insulin, . Abdominal:   (+) obese,           Vascular: Other Findings:             Anesthesia Plan      general and regional     ASA 3       Induction: intravenous. MIPS: Postoperative opioids intended. Anesthetic plan and risks discussed with patient. Use of blood products discussed with patient whom consented to blood products. Plan discussed with CRNA.     Attending anesthesiologist reviewed and agrees with Preprocedure content              [employer], DO   10/7/2021

## 2021-10-07 NOTE — PROGRESS NOTES
Admitted to [facility]u. Blood infusing due to low BPS and 200 to 300 ml blood loss. Pt responds with groans only. VSS on monitor.  Report given by CRNA

## 2021-10-07 NOTE — PROGRESS NOTES
Received pt from pacu, pt is sleeping soundly and is fairly easy to awaken. Pt has clean, dry, intact ace wrap dressings on bilateral feet. Iv on right chest wall is patent. Placed telemetry. Discussed care plan with pt and her sister- sister verbalized understanding- will discuss again when pt is more awake. Will monitor.

## 2021-10-08 NOTE — PROGRESS NOTES
Daily    hydrALAZINE  50 mg Oral 3 times per day    lidocaine 1 % injection  5 mL IntraDERmal Once    sodium chloride flush  5-40 mL IntraVENous 2 times per day    hydrALAZINE  10 mg IntraVENous Once    vancomycin  750 mg IntraVENous Q24H    meropenem  1,000 mg IntraVENous Q12H    insulin glargine  12 Units SubCUTAneous Nightly    insulin lispro  0-18 Units SubCUTAneous TID WC    insulin lispro  0-9 Units SubCUTAneous Nightly    fluconazole  200 mg IntraVENous Q24H    amitriptyline  100 mg Oral Nightly    aspirin EC  81 mg Oral Daily    atorvastatin  20 mg Oral Nightly    doxazosin  4 mg Oral BID    escitalopram  10 mg Oral Daily    gabapentin  400 mg Oral BID    methadone  140 mg Oral Daily    pantoprazole  40 mg Oral Daily    sodium chloride flush  5-40 mL IntraVENous 2 times per day    influenza virus vaccine  0.5 mL IntraMUSCular Prior to discharge     Continuous Infusions:   sodium chloride      sodium chloride Stopped (10/08/21 0717)    sodium chloride      dextrose      sodium chloride 300 mL/hr at 10/07/21 1510     PRN Meds:hydrALAZINE, sodium chloride, sodium chloride flush, sodium chloride, labetalol, sodium chloride, glucose, dextrose, glucagon (rDNA), dextrose, albuterol, sodium chloride flush, sodium chloride, ondansetron **OR** ondansetron, polyethylene glycol, acetaminophen **OR** acetaminophen    Objective:   Vitals:   T-max:  Patient Vitals for the past 8 hrs:   BP Temp Temp src Pulse Resp SpO2   10/08/21 1100 99/63 98.5 °F (36.9 °C) Oral 66 16 92 %   10/08/21 1011 (!) 113/57   80     10/08/21 0808 (!) 106/51 97.7 °F (36.5 °C) Axillary 67 18 92 %       Intake/Output Summary (Last 24 hours) at 10/8/2021 1452  Last data filed at 10/8/2021 1020  Gross per 24 hour   Intake 740 ml   Output 500 ml   Net 240 ml     ROS as above    Physical Exam  Physical Exam  Constitutional:       General: She is not in acute distress. HENT:      Head: Normocephalic and atraumatic.       Nose: No congestion or rhinorrhea. Mouth/Throat:      Mouth: Mucous membranes are moist.      Pharynx: No oropharyngeal exudate or posterior oropharyngeal erythema. Eyes:      General: No scleral icterus. Conjunctiva/sclera: Conjunctivae normal.   Cardiovascular:      Rate and Rhythm: Normal rate and regular rhythm. Heart sounds: No murmur heard. No gallop. Pulmonary:      Effort: No respiratory distress. Breath sounds: No wheezing or rales. Abdominal:      General: There is no distension. Palpations: Abdomen is soft. Tenderness: There is no abdominal tenderness. There is no guarding. Musculoskeletal:      Cervical back: Neck supple. No tenderness. Right lower leg: Edema present. Left lower leg: Edema present. Comments: Chronic venous changes on b/l LE, mild tenderness, significant swelling. B/L feet with wounds, not purulent    Skin:     General: Skin is dry. Capillary Refill: Capillary refill takes less than 2 seconds. Neurological:      General: No focal deficit present. Mental Status: She is alert and oriented to person, place, and time. LABS:    CBC:   Recent Labs     10/06/21  0849 10/06/21  0849 10/07/21  0520 10/07/21  1157 10/07/21  1705 10/08/21  0454 10/08/21  1330   WBC 5.3  --  6.2  --   --  9.6  --    HGB 7.2*   < > 6.9*   < > 7.3* 7.5* 6.8*   HCT 22.9*   < > 21.3*   < > 21.7* 22.6* 20.1*     --  348  --   --  324  --    MCV 81.4  --  80.8  --   --  83.4  --     < > = values in this interval not displayed.      Renal:    Recent Labs     10/06/21  0849 10/06/21  0849 10/07/21  0520 10/08/21  0454 10/08/21  1015     --  140 139  --    K 4.9   < > 4.6 5.8* 5.7*     --  105 104  --    CO2 27  --  27 27  --    BUN 38*  --  37* 37*  --    CREATININE 2.3*  --  1.9* 2.2*  --    GLUCOSE 148*  --  208* 141*  --    CALCIUM 8.9  --  8.6 8.6  --    MG 2.00  --  1.90 1.80  --    ANIONGAP 8  --  8 8  --     < > = values in this interval not displayed. Hepatic:   No results for input(s): AST, ALT, BILITOT, BILIDIR, PROT, LABALBU, ALKPHOS in the last 72 hours. Troponin:   No results for input(s): TROPONINI in the last 72 hours. BNP: No results for input(s): BNP in the last 72 hours. Lipids: No results for input(s): CHOL, HDL in the last 72 hours. Invalid input(s): LDLCALCU, TRIGLYCERIDE  ABGs:  No results for input(s): PHART, CHQ0XSM, PO2ART, HED8SST, BEART, THGBART, T6KWYJEH, XIK8FMP in the last 72 hours. INR:   No results for input(s): INR in the last 72 hours. Lactate: No results for input(s): LACTATE in the last 72 hours. Cultures:  -----------------------------------------------------------------  RAD:   XR FOOT RIGHT (2 VIEWS)   Final Result      Expected postsurgical changes of right fourth ray and partial cuboid resection. XR FOOT LEFT (2 VIEWS)   Final Result      Expected postsurgical changes of left fifth ray resection. VL Extremity Venous Bilateral   Final Result      IR TUNNELED CVC PLACE WO SQ PORT/PUMP > 5 YEARS   Final Result   Impression:      Successful placement of a tunneled central venous catheter via the right internal jugular vein. The catheter is ready for immediate use. VL DUP LOWER EXTREMITY ARTERIES BILATERAL   Final Result      MRI FOOT LEFT WO CONTRAST   Final Result   Osteomyelitis involving the fifth metatarsal and fifth proximal phalanx with extensive osseous destruction. Mild osteomyelitis involving the lateral aspect of the cuboid. Prior partial first digit amputation. MRI FOOT RIGHT WO CONTRAST   Final Result   Multiple prior amputations. Soft tissue ulceration lateral to the cuboid with mild acute osteomyelitis involving the lateral base of the fourth metatarsal and more distal plantar aspect of the remaining fourth metatarsal shaft as well as the lateral aspect of the cuboid.       XR FOOT LEFT (MIN 3 VIEWS)   Final Result      Numerous osteotomies with soft tissue swelling and possible ulcer lateral aspect of the right midfoot. No plain radiographic evidence for osteomyelitis, however plain film findings for osteomyelitis are often late findings. 3 views left foot      FINDINGS:      There is a mottled appearance of the phalanges of the right fifth digit as well as the fifth metatarsal. The remaining metatarsals unremarkable in appearance. Patient's had prior osteotomy of the first distal phalangeal tuft. IMPRESSION:      Plain radiographic evidence for osteomyelitis involving the fifth phalanges and fifth metatarsal. Patient's bones are osteopenic. XR FOOT RIGHT (MIN 3 VIEWS)   Final Result      Numerous osteotomies with soft tissue swelling and possible ulcer lateral aspect of the right midfoot. No plain radiographic evidence for osteomyelitis, however plain film findings for osteomyelitis are often late findings. 3 views left foot      FINDINGS:      There is a mottled appearance of the phalanges of the right fifth digit as well as the fifth metatarsal. The remaining metatarsals unremarkable in appearance. Patient's had prior osteotomy of the first distal phalangeal tuft. IMPRESSION:      Plain radiographic evidence for osteomyelitis involving the fifth phalanges and fifth metatarsal. Patient's bones are osteopenic. XR CHEST PORTABLE   Final Result      Mild cardiomegaly and mild interstitial edema. Assessment/Plan:      Allyson Osuna. Alejandra Stinson is a 61 yo F with PMH CKD4, DVT (on Eqliquis), T2DM c/b b/l lower extremity ulcers (follows with Podiatry outpatient, last visit 9/27/21), HTN, history of pain medication addiction (on Methadone 140 mg/day) who presented for progressively worsening b/l lower extremity pain for the 2 weeks. Osteomyelitis of b/l feet  Recently debrided outpatient on 9/27/21, do not appear acutely infected. Patient on Clinda and Cipro.  XR of both feet show osteomyelitis involving 5th phalanges and fifth

## 2021-10-08 NOTE — PLAN OF CARE
Problem: Nutrition  Goal: Optimal nutrition therapy  Outcome: Ongoing     Nutrition Problem #1: Increased nutrient needs  Intervention: Food and/or Nutrient Delivery: Continue Current Diet, Continue Oral Nutrition Supplement  Nutritional Goals: Pt will tolerate and consume >50% of all meals and ONS throuhout adm.

## 2021-10-08 NOTE — PROGRESS NOTES
Consent for blood signed and on pt's chart. rn called blood bank to see about obtaining a unit of blood, per blood bank, must come from [facility] due to antibodies. Per blood bank can take up to 4 hrs to obtain unit of blood. rn let dr. Aleks Villar know via perfect serve about the delay.

## 2021-10-08 NOTE — CARE COORDINATION
Case Management Assessment           Daily Note                 Date/ Time of Note: 10/8/2021 4:33 PM         Note completed by: Wen Mast RN    Patient Name: Mara Jose  YOB: 1965    Diagnosis:Systolic CHF, acute ([facility] [address] [I50.21]  Acute on chronic congestive heart failure, unspecified heart failure type ([facility] [address] [I50.9]  Patient Admission Status: Inpatient    Date of Admission:10/1/2021  6:16 PM Length of Stay: 7 GLOS: GMLOS: 8.3    Current Plan of Care:   Nephro / ID following :          S/p bilateral lower extremity I&D with left fifth ray resection, right fourth ray resection, right partial cuboid resection, 10/7/2021     Anticipate need PICC and iv antibiotics after discharge________________________________________________________________________________________  PT AM-PAC:   / 24 per last evaluation on: pending  Ordered  10/4  Will follow up  POST OP for  Dispo planning rec:      OT AM-PAC:   / 24 per last evaluation on:   pending  Ordered  10/4    DME Needs for discharge: TBD  Vs  Defer to  SNF    ________________________________________________________________________________________  Discharge Plan: Home with [address] [place] [address] Way: VS  SNF    Referral made to [facility]     Tentative discharge date: tbd     Current barriers to discharge: IV atbx  ,  Cx pending :  placment   Medical  Podiatry clearance     Referrals completed: Not Applicable  [facility] following /      Resources/ information provided: Not indicated at this time  ________________________________________________________________________________________  Case Management Notes:    Patient will likely need  SNF   D/t  Long term IV atbx  needed and  Will be  NWB  :  CM will assist  With  SNF  List  And make referral  Based on preference and choice     Patient  Provided  SNF list and  1 st choice is  [facility] [address] but they are out of   Company  Can accept:   Will discuss  Further with pt :  CM attempted  To speak with  Ms Harvey Ford today but  On several occassions pt was  Very lethargic and  unable to stay awake t discuss  ,  RN Aware  , as she has  Spoke n with MD  As well about her  Lethargy. Patient will return to OR pending Biopsies  And  Cont IV atbx     Not medically stable for  D/c          Kimberly Beltran and her family were provided with choice of provider; she and her family are in agreement with the discharge plan.     Care Transition Patient: Lianna Keys RN  [facility]  Case Management Department  Ph: [phone]

## 2021-10-08 NOTE — PROGRESS NOTES
Podiatric Surgery Daily Progress Note  Sita Ruff      Subjective :   Patient seen and examined this am at the bedside. Patient denies any acute overnight events. Patient denies N/V/F/C/SOB. Patient denies calf pain, thigh pain, chest pain. Review of Systems: A 12 point review of symptoms is unremarkable with the exception of the chief complaint. Patient specifically denies nausea, fever, vomiting, chills, shortness of breath, chest pain, abdominal pain, constipation or difficulty urinating. Objective     /66   Pulse 67   Temp 97.7 °F (36.5 °C) (Axillary)   Resp 18   Ht 5' 2\" (1.575 m)   Wt 204 lb 2.3 oz (92.6 kg)   LMP 10/23/2015   SpO2 92%   BMI 37.34 kg/m²      I/O:    Intake/Output Summary (Last 24 hours) at 10/8/2021 0937  Last data filed at 10/7/2021 2255  Gross per 24 hour   Intake 889 ml   Output 700 ml   Net 189 ml              Wt Readings from Last 3 Encounters:   10/08/21 204 lb 2.3 oz (92.6 kg)   08/12/21 200 lb (90.7 kg)   06/18/21 218 lb 0.6 oz (98.9 kg)       LABS:    Recent Labs     10/07/21  0520 10/07/21  1157 10/07/21  1705 10/08/21  0454   WBC 6.2  --   --  9.6   HGB 6.9*   < > 7.3* 7.5*   HCT 21.3*   < > 21.7* 22.6*     --   --  324    < > = values in this interval not displayed. Recent Labs     10/08/21  0454      K 5.8*      CO2 27   BUN 37*   CREATININE 2.2*        Recent Labs     10/05/21  1154 10/05/21  2139   INR 0.98  --    APTT  --  61.3*           LOWER EXTREMITY EXAMINATION    Dressing to bilateral LE intact. No strikethrough noted to the external dressing. Moderate sanguinous drainage noted to the internal layers of the dressing.      VASCULAR: DP and PT pulses nonpalpable bilateral.  Upon hand-held Doppler examination, DP and PT signals weakly biphasic bilateral. CFT is brisk to the distal aspect of the foot bilateral. Skin temperature is warm to cool from proximal to distal with no focal calor noted.  +2 pitting edema noted bilateral.  No pain with calf compression b/l.     NEUROLOGIC: Gross and epicritic sensation is diminished b/l. Protective sensation is diminished at all pedal sites b/l. DERMATOLOGIC:   Right lower extremity:  Full-thickness ulceration noted to the plantar aspect beginning distally at the level of the previous fourth metatarsal head and extending proximally to the cuboid with cuboid bone exposed. Retention sutures intact. Surgifoam left intact to proximal aspect of wound bed. Wound base granular. No fluctuance, crepitus, active drainage, erythema, or malodor noted. No dehiscence noted to wound edges.       Left lower extremity:  Full-thickness ulceration noted plantar lateral aspect beginning at the base of the fourth digit and extending proximally towards the base of the fourth metatarsal and laterally towards the third metatarsal with fourth metatarsal base and cuboid bone exposed.  Retention sutures intact. Surgifoam left intact to wound bed. Wound base granular. No fluctuance, crepitus, active drainage, erythema, or malodor noted. No dehiscence noted to wound edges. MUSCULOSKELETAL: Muscle strength is 4/5 for all pedal groups tested. No pain with palpation of the foot or ankle b/l. Ankle joint ROM is decreased in dorsiflexion with the knee extended.  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 S/p left fifth ray resection and right fourth ray and partial cuboid resection.     IMAGING:  X-ray right foot 10/7/2021-postop  Narrative   EXAM: XR FOOT RIGHT (2 VIEWS)       INDICATION:  s/p 4th ray resection and partial cuboid resection; packed open       COMPARISON: 10/3/2021, 10/2/2021       FINDINGS:       There are postsurgical changes of fourth ray resection and partial cuboid resection.  Postsurgical changes of prior fifth ray resection and first, second, and third toe resections again seen.           Impression       Expected postsurgical changes of right fourth ray and partial cuboid resection. X-ray left foot 10/7/2021-postop  Narrative   EXAM: XR FOOT LEFT (2 VIEWS)       INDICATION:  s/p 5th ray resection & cuboid bone biopsy; packed open       COMPARISON: 10/3/2021, 10/2/2021       FINDINGS:       There are postsurgical changes of fifth ray resection. There is a small amount of postsurgical subcutaneous gas. Redemonstration of prior amputation of the first digit at the level of the base of the first proximal phalanx again seen.           Impression       Expected postsurgical changes of left fifth ray resection. MRI right foot 10/3/2021  Narrative   EXAMINATION: Magnetic resonance imaging (MRI) of the right foot without contrast       HISTORY: Osteomyelitis 5th metatarsal base; rule out OM cuboid       TECHNIQUE: MRI of the right foot was performed using multiple pulse sequences in multiple planes without contrast.       COMPARISON: Radiograph dated 10/2/2021       FINDINGS:        There have been prior first second third MTP joint, fourth transmetatarsal, and fifth TMT joint amputations. There is a soft tissue ulcer lateral to the base of the fourth metatarsal with adjacent T1 hypointensity in marrow edema involving the base of    the fourth metatarsal as well as the lateral aspect of the cuboid representing osteomyelitis. There is also soft tissue swelling more distally along the plantar aspect of the remaining fourth metatarsal shafts with osteomyelitis along the plantar cortex    of the remaining fourth metatarsal. There is no acute fracture or dislocation. There is a degenerative subchondral cyst in the navicular. No abscess is seen within limits of noncontrast examination. Visualized tendons and plantar fascia are unremarkable.           Impression   Multiple prior amputations.    Soft tissue ulceration lateral to the cuboid with mild acute osteomyelitis involving the lateral base of the fourth metatarsal and more distal plantar aspect of the remaining fourth metatarsal shaft as well as the lateral aspect of the cuboid.        MRI left foot 10/3/2021  Narrative   EXAMINATION: Magnetic resonance imaging (MRI) of the left foot without contrast       HISTORY: Osteomyelitis 5th metatarsal       TECHNIQUE: MRI of the left foot was performed using multiple pulse sequences in multiple planes without contrast.       COMPARISON: Radiograph dated 10/2/2021       FINDINGS:        There has been prior amputation of the first digit at the level of the base of the first proximal phalanx. There is soft tissue ulceration lateral to the fifth mid metatarsal with surrounding edema and phlegmon in the subcutaneous tissues. There is    extensive destruction of the mid to distal aspect of the fifth metatarsal and base of the fifth proximal phalanx representing sequelae of osteomyelitis. There is also mild osteomyelitis extending to the base of the fifth metatarsal as well as along the    lateral aspect of the cuboid.           Impression   Osteomyelitis involving the fifth metatarsal and fifth proximal phalanx with extensive osseous destruction. Mild osteomyelitis involving the lateral aspect of the cuboid. Prior partial first digit amputation.      Lower extremity arterial duplex 10/4/2021-preliminary  Right   Right CRISTIAN was not available due to patient non-compliance . There are multiphasic waveforms in the common femoral artery indicating no   aortoiliac inflow disease. There is atherosclerotic plaque involving the superficial femoral and   popliteal arteries with no significantly elevated velocities. Left   Left CRISTIAN was not available due to patient non-compliance . There are multiphasic waveforms in the common femoral artery indicating no   aortoiliac inflow disease. There is atherosclerotic plaque involving the superficial femoral and   popliteal arteries with no significantly elevated velocities. The peroneal artery is not visualized.    There is no previous exam for clearance per medicine team.  Continue antibiotics per ID recommendations. Will follow up on bone biopsies for bilateral lower extremity.     Discussed assessment and plan with Dr. Charles Martinez DPM.    ALTAGRACIA Ball [facility]  10/08/21  9:37 AM

## 2021-10-08 NOTE — PROGRESS NOTES
Office : [phone]     Fax :[phone]       Nephrology progress  Note      Patient's Name: Ronni Fofana  10/7/2021    Reason for Consult:  FAHEEM on CKD 4       Requesting Physician:  Claire Braga MD      Chief Complaint:    Chief Complaint   Patient presents with    Leg Pain     x 2 weeks. History of Present iIlness:    Ronni Fofana is a 60 y.o. female with past medical h/o CKD4, DVT, T2DM c/b b/l lower extremity ulcers  who presented for progressively worsening b/l lower extremity pain for the 2 weeks. Patient states she presented today with pain in legs so severe that it is now inhibiting her ability to ambulate. She endorses that the legs are also more swollen and warm to touch with pain when she touches it. She endorses she tries to eat a healthy diet low in salt, she also denies any major weight fluctuations recently.     Baseline creat is 2/0   Now elevated at 2.4        Interval hx :    No acute events overnight    creatinine better     Edema better     On abx       I/O last 3 completed shifts:   In: 200 [Blood:329]  Out: 500 [Urine:500]    Past Medical History:   Diagnosis Date    Asthma 05/14/2004    Bacterial vaginosis 04/2008    Carpal tunnel syndrome 05/2007    COPD (chronic obstructive pulmonary disease) ([address]     Diabetes mellitus type II 08/2007    10/1/20 pt states does accucheck 2x/day at home    Diabetic neuropathy ([address] 03/8672    Diastolic CHF ([address]     DVT (deep venous thrombosis) ([address] 03/2004    Dyslipidemia 05/2009    Dyspareunia 05/2009    ETOH abuse 03/04/2007    Feet clawing     HTN (hypertension)     Hx of blood clots     Hyperlipidemia     MRSA (methicillin resistant staph aureus) culture positive 11/06/2017; 11/17/2017    foot; 02/24/2017    AMPUTATION LEFT GREAT TOE                 TONSILLECTOMY         History reviewed. No pertinent family history. reports that she quit smoking about 3 years ago. Her smoking use included cigarettes. She has a 30.00 pack-year smoking history. She has never used smokeless tobacco. She reports that she does not drink alcohol and does not use drugs.         Allergies:  Sulfa antibiotics    Current Medications:    lidocaine PF 1 % injection 5 mL, Once  sodium chloride flush 0.9 % injection 5-40 mL, 2 times per day  sodium chloride flush 0.9 % injection 5-40 mL, PRN  0.9 % sodium chloride infusion, PRN  labetalol (NORMODYNE;TRANDATE) injection syringe 10 mg, Q4H PRN  hydrALAZINE (APRESOLINE) injection 5 mg, Q4H PRN  hydrALAZINE (APRESOLINE) injection 10 mg, Once  vancomycin (VANCOCIN) 750 mg in dextrose 5 % 250 mL IVPB, Q24H  0.9 % sodium chloride infusion, PRN  hydrALAZINE (APRESOLINE) tablet 25 mg, 3 times per day  heparin (porcine) injection 7,380 Units, PRN  heparin (porcine) injection 3,690 Units, PRN  heparin 25,000 units in dextrose 5% 250 mL (premix) infusion, Continuous  furosemide (LASIX) tablet 20 mg, Daily  meropenem (MERREM) 1,000 mg in sodium chloride 0.9 % 100 mL IVPB (mini-bag), Q12H  glucose (GLUTOSE) 40 % oral gel 15 g, PRN  dextrose 50 % IV solution, PRN  glucagon (rDNA) injection 1 mg, PRN  dextrose 5 % solution, PRN  insulin glargine (LANTUS;BASAGLAR) injection pen 12 Units, Nightly  insulin lispro (1 Unit Dial) 0-18 Units, TID WC  insulin lispro (1 Unit Dial) 0-9 Units, Nightly  fluconazole (DIFLUCAN) 200 mg IVPB, Q24H  albuterol (PROVENTIL) nebulizer solution 2.5 mg, Q6H PRN  amitriptyline (ELAVIL) tablet 100 mg, Nightly  aspirin EC tablet 81 mg, Daily  atorvastatin (LIPITOR) tablet 20 mg, Nightly  doxazosin (CARDURA) tablet 4 mg, BID  escitalopram (LEXAPRO) tablet 10 mg, Daily  gabapentin (NEURONTIN) capsule 400 mg, BID  methadone (DOLOPHINE) tablet 140 mg, Daily  metoprolol ALB, BILITOT, ALKPHOS in the last 72 hours. Troponin:   No results for input(s): TROPONINI in the last 72 hours. BNP: No results for input(s): BNP in the last 72 hours. Lipids: No results for input(s): CHOL, TRIG, HDL, LDLCALC, LABVLDL in the last 72 hours. ABGs: No results for input(s): PHART, PO2ART, ZNP3ZVP in the last 72 hours. INR:   Recent Labs     10/05/21  1154   INR 0.98     UA:  No results for input(s): COLORU, CLARITYU, GLUCOSEU, BILIRUBINUR, KETUA, SPECGRAV, BLOODU, PHUR, PROTEINU, UROBILINOGEN, NITRU, LEUKOCYTESUR, Albina Comment in the last 72 hours. Urine Microscopic:   No results for input(s): LABCAST, BACTERIA, COMU, HYALCAST, WBCUA, RBCUA, EPIU in the last 72 hours. Urine Culture: No results for input(s): LABURIN in the last 72 hours. Urine Chemistry:   No results for input(s): Graeme Clos, PROTEINUR, NAUR in the last 72 hours. IMAGING:  XR FOOT RIGHT (2 VIEWS)   Final Result      Expected postsurgical changes of right fourth ray and partial cuboid resection. XR FOOT LEFT (2 VIEWS)   Final Result      Expected postsurgical changes of left fifth ray resection. VL Extremity Venous Bilateral   Final Result      IR TUNNELED CVC PLACE WO SQ PORT/PUMP > 5 YEARS   Final Result   Impression:      Successful placement of a tunneled central venous catheter via the right internal jugular vein. The catheter is ready for immediate use. VL DUP LOWER EXTREMITY ARTERIES BILATERAL   Final Result      MRI FOOT LEFT WO CONTRAST   Final Result   Osteomyelitis involving the fifth metatarsal and fifth proximal phalanx with extensive osseous destruction. Mild osteomyelitis involving the lateral aspect of the cuboid. Prior partial first digit amputation. MRI FOOT RIGHT WO CONTRAST   Final Result   Multiple prior amputations.    Soft tissue ulceration lateral to the cuboid with mild acute osteomyelitis involving the lateral base of the fourth metatarsal and more distal plantar aspect of the remaining fourth metatarsal shaft as well as the lateral aspect of the cuboid. XR FOOT LEFT (MIN 3 VIEWS)   Final Result      Numerous osteotomies with soft tissue swelling and possible ulcer lateral aspect of the right midfoot. No plain radiographic evidence for osteomyelitis, however plain film findings for osteomyelitis are often late findings. 3 views left foot      FINDINGS:      There is a mottled appearance of the phalanges of the right fifth digit as well as the fifth metatarsal. The remaining metatarsals unremarkable in appearance. Patient's had prior osteotomy of the first distal phalangeal tuft. IMPRESSION:      Plain radiographic evidence for osteomyelitis involving the fifth phalanges and fifth metatarsal. Patient's bones are osteopenic. XR FOOT RIGHT (MIN 3 VIEWS)   Final Result      Numerous osteotomies with soft tissue swelling and possible ulcer lateral aspect of the right midfoot. No plain radiographic evidence for osteomyelitis, however plain film findings for osteomyelitis are often late findings. 3 views left foot      FINDINGS:      There is a mottled appearance of the phalanges of the right fifth digit as well as the fifth metatarsal. The remaining metatarsals unremarkable in appearance. Patient's had prior osteotomy of the first distal phalangeal tuft. IMPRESSION:      Plain radiographic evidence for osteomyelitis involving the fifth phalanges and fifth metatarsal. Patient's bones are osteopenic. XR CHEST PORTABLE   Final Result      Mild cardiomegaly and mild interstitial edema. Assessment/Plan :      1. Lin onc CKD 4   LIN 2/2 multiple factors   Edema noted  LIN better   Start lasix today     2. HTN. BP controlled   - on hydralazine    3. Acute on chronic CHF   - stable     4. DM 2. Needs better control     5. Electrolytes. Monitor and replace.      6. Diabetic foot infection with necrotic ulcer   Renal dose abx   ID following Recommend to dose adjust all medications  based on renal functions  Maintain SBP> 90 mmHg   Daily weights   AVOID NSAIDs  Avoid Nephrotoxins  Monitor Intake/Output  Call if significant decrease in urine output           Thank you for allowing us to participate in care of Wilber Clifford         Electronically signed by: Morris Raymond MD, 10/7/2021, 10:38 PM      [facility] [address] [place] [address] S  Office : [phone]  Fax :[phone]

## 2021-10-08 NOTE — PROGRESS NOTES
Clinical Pharmacy Progress Note    Vancomycin - Management by Pharmacy    Consult Date(s): 10/2/21  Consulting Provider(s): Dr. Paulina Lewis / Plan    1) Osteomyelitis of B/L feet - Vancomycin   Concurrent Antimicrobials:   o Meropenem - day #7  o Fluconazole - day #7   Day of Vanc Therapy: day #7   Current Dosing Method: AUC-based dosing   Therapeutic Goal: 400-600 mg/L*hr   Current Dose / Frequency: Intermittent dosing (see below changes)   Plan / Rationale:   o FAHEEM on CKD, though SCr has trended to baseline with improvement in UOP. Baseline SCr appears to be approx. 2 mg/dL given historical values. Will continue with AUC-based dosing, as renal function appears to have stabilized. o Random level this AM at 18.6 mcg/mL with serum creatinine of 2.2 mg/dL. Assessment of current regimen using Bayesian kinetics predicts an AUC of 538 mg/L*hr with an associated trough of 17.8 mcg/mL. o Continue current regimen of 750 mg IV Q24 hours.  Will continue to monitor renal function closely alongside clinical condition and make adjustments to regimen as appropriate. Thank you for allowing us to participate in the care of this patient. Please reach out with any questions. Jeison Perez, MargothD, BCPS  10/7/2021  Wireless: [phone]      Interval Update:  Remains afebrile. Renal fn improved and stabilized, with improvements in UOP. Wound culture growing E faecalis, E coli and Pseudomonas. Pt underwent BLE I&D, 5th ray resection L foot, and partial cuboid resection bilateral feet yesterday. Subjective/Objective: Ms. Amie Rinaldi is a 60 y.o. female with a PMHx significant for CKD4, DVT (2004) on apixaban, DM2, chronic B/L LE ulcers, HTN, and narcotic-dependent chronic pain on methadone admitted for B/L LE osteomyelitis. Pharmacy has been consulted to dose vancomycin.     Height:   Ht Readings from Last 1 Encounters:   10/01/21 5' 2\" (1.575 m)     Weight:   Wt Readings from Last 1 Encounters:   10/08/21 204 lb 2.3 oz (92.6 kg)     Level(s) / Doses:    Date Time Dose Level / Type of Level Interpretation   10/2 1401 2250 mg IV x1     10/3 0712   Random = 24.5 mcg/mL Drawn ~19 hrs after previous dose given. Hold dose. 10/4 0829  Random =18.1 mcg/mL  Re-dose 500 mg IV x1    1231 500 mg IV x1     10/5 0307  Random = 18.8 mcg/mL Drawn 14.5 hrs after dose    1231 500 mg IV x1  Re-dose with 500 mg IV x 1   10/6 0849  Random = 18.6 mcg/mL Drawn 18h after dose. Will re-dose 500 mg IV x1    1511 500 mg IV x 1     10/7 0520  Random = 15.6 mcg/mL Switch to AUC-based dosing. Schedule vancomycin 750 mg IV Q24 hours and re-assess random 10/8.    1705 750 mg IV x 1     10/8 0454  Random = 18.6 mcg/mL Continue current regimen of 750 mg IV Q24h. Predicted to achieve AUC of 538 mg/L*hr. Cultures & Sensitivities:    Date Site Micro Susceptibility / Result   10/2 Foot wound Pseudomonas aeruginosa     S: cefepime, gent, meropenem, Zosyn, tobramycin  R: Cipro     Foot wound E. coli  S: Cefepime, ceftriaxone, cefuroxime, ertapenem, gent, meropenem, Zosyn, Bactrim  R: Cipro    Foot wound  Enterococcus faecalis S: Ampicillin, Vancomycin      Foot wound Providencia stuartii      Labs / Ancillary Data:    Estimated Creatinine Clearance: 30 mL/min (A) (based on SCr of 2.2 mg/dL (H)).     Recent Labs     10/06/21  0849 10/07/21  0520 10/08/21  0454   CREATININE 2.3* 1.9* 2.2*   BUN 38* 37* 37*   WBC 5.3 6.2 9.6

## 2021-10-08 NOTE — PROGRESS NOTES
Comprehensive Nutrition Assessment    RECOMMENDATIONS:  1. PO Diet: Continue current 3 CC, 2 gm sodium diet with 2000 ml fluid restriction  2. ONS: Continue High Calorie / High Protein Ensure Enlive QD; Continue Joshua TID   3. Nutrition Education: RD provided DM diet edu and handout; RD also reviewed importance of adequate protein and calorie intake as well as including joshua to dietary regimen to promote wound healing. 4. Encourage ONS consumption if meal po intake is less than 50%    NUTRITION ASSESSMENT:   Type and Reason for Visit:  Type and Reason for Visit: Reassess     Nutritional summary & status: Pt reports good appetite and intake. States she has been eating all meals; per EMR, pt with % of po intake at meals. RD encouraged consumption of ONS if po intake is less than 50% at meals. Pt stated she didn't try the [address] yet. RD brought pt water to mix in with Joshua and pt tried in front of RD. Pt states the flavor is ok but she will drink it to help heal her wounds. RD encouraged po intakes. Pt states one of her daughters does the cooking and grocery shopping. RD verbally reviewed DM diet edu and provided pt with handout. Handout has contact info if pt has any questions or concerns prior to discharge. Will continue to monitor po intakes and ONS tolerance throughout adm. Patient admitted d/t Systolic CHF, acute    PMH significant for: CKD4, DVT, T2DM c/b b/l lower extremity ulcers      MALNUTRITION ASSESSMENT  Context of Malnutrition: Acute Illness   Malnutrition Status: Insufficient data    NUTRITION DIAGNOSIS   · Increased nutrient needs related to increase demand for energy/nutrients (protein) as evidenced by wounds    NUTRITION INTERVENTION  Food and/or Nutrient Delivery:  Continue Current Diet, Continue Oral Nutrition Supplement  Nutrition Education/Counseling:  No recommendation at this time   Goals:  Pt will tolerate and consume >50% of all meals and ONS throuhout adm.        Nutrition Monitoring and Evaluation:   Food/Nutrient Intake Outcomes:  Food and Nutrient Intake, Supplement Intake  Physical Signs/Symptoms Outcomes:  Biochemical Data, Weight     OBJECTIVE DATA: Significant to nutrition assessment  · Nutrition-Focused Physical Findings: Nutrition Related Findings: lbm 10/2, +2 BLE edema   · Labs: Reviewed; hgbA1C 8.3 (10/1/21), POCBG 260  · Meds: Reviewed; protonix, lasix  · Wounds: Wound Type: Diabetic Ulcer     CURRENT NUTRITION THERAPIES  Adult Oral Nutrition Supplement; Standard High Calorie/High Protein Oral Supplement  ADULT DIET; Regular; 3 carb choices (45 gm/meal); Low Sodium (2 gm); 2000 ml  Adult Oral Nutrition Supplement; Wound Healing Oral Supplement     PO Intake: Average Meal Intake: %   PO Supplement Intake:Average Supplements Intake: 1-25%, 26-50%  IVF: n/a    ANTHROPOMETRICS  Current Height: 5' 2\" (157.5 cm)  Current Weight: 204 lb 2.3 oz (92.6 kg)    Admission weight: 203 lb (92.1 kg)  Ideal Body Weight (lbs) (Calculated): 110 lbs (Ideal Body Weight (Kg) (Calculated): 50 kg)  Usual Bodyweight: ~200 lbs per EMR  Weight Changes: wt fluctuations r/t CHF       BMI BMI (Calculated): 37.4    Wt Readings from Last 50 Encounters:   10/03/21 221 lb 12.5 oz (100.6 kg)   08/12/21 200 lb (90.7 kg)   06/18/21 218 lb 0.6 oz (98.9 kg)   06/17/21 203 lb (92.1 kg)   04/12/21 203 lb 6.4 oz (92.3 kg)   03/29/21 200 lb (90.7 kg)   03/24/21 204 lb 4.8 oz (92.7 kg)   03/12/21 198 lb 9.6 oz (90.1 kg)   01/07/21 209 lb (94.8 kg)     COMPARATIVE STANDARDS  Energy (kcal):  0622-5869 (12-15); Weight Used for Energy Requirements:  Usual (200 lbs 8/12/21 d/t current edema status )     Protein (g):  63-75 (1.25-1.5); Weight Used for Protein Requirements:  Ideal        Fluid (ml/day):  2000mL; Method Used for Fluid Requirements:   (fluid restriction )      The patient will still be monitored per nutrition standards of care.   Consult dietitian if nutrition interventions essential to patient care is needed.      Yossi Gutierrez, [address] [place] [address],   [place]:  [phone]  Office:  [phone]

## 2021-10-08 NOTE — PROGRESS NOTES
ID Follow-up NOTE    CC:   Diabetic foot ulcer / infection  Antibiotics: Vancomycin, Meropenem    Admit Date: 10/1/2021  Hospital Day: 8    Subjective:     POD#1 --  R 4th ray, partial cuboid resection  L 5th ray resection    Patient c/o b/l foot pain, R>L      Objective:     Patient Vitals for the past 8 hrs:   BP Temp Temp src Pulse Resp SpO2 Weight   10/08/21 1011 (!) 113/57   80      10/08/21 0808 (!) 106/51 97.7 °F (36.5 °C) Axillary 67 18 92 %    10/08/21 0506       204 lb 2.3 oz (92.6 kg)   10/08/21 0500 112/66 97.5 °F (36.4 °C) Oral 63 20 94 %      I/O last 3 completed shifts: In: 889 [P.O.:560; Blood:329]  Out: 700 [Urine:700]  I/O this shift:  In: 180 [P.O.:180]  Out: 100 [Urine:100]    EXAM:  GENERAL: No apparent distress.   RA  HEENT: Membranes moist, no oral lesion  NECK:  Supple, no lymphadenopathy  LUNGS: Clear b/l, no rales, no dullness  CARDIAC: RRR, no murmur appreciated  ABD:  Obese, + BS, soft / NT  EXT:  LE venous stasis, b/l foot dressing / ACE in place  NEURO: No focal neurologic findings  PSYCH: Orientation, sensorium, mood normal  LINES:  Peripheral iv       Data Review:  Lab Results   Component Value Date    WBC 9.6 10/08/2021    HGB 7.5 (L) 10/08/2021    HCT 22.6 (L) 10/08/2021    MCV 83.4 10/08/2021     10/08/2021     Lab Results   Component Value Date    CREATININE 2.2 (H) 10/08/2021    BUN 37 (H) 10/08/2021     10/08/2021    K 5.8 (H) 10/08/2021     10/08/2021    CO2 27 10/08/2021       Hepatic Function Panel:   Lab Results   Component Value Date    ALKPHOS 131 10/01/2021    ALT 13 10/01/2021    AST 16 10/01/2021    PROT 6.9 10/01/2021    PROT 6.6 07/21/2011    BILITOT <0.2 10/01/2021    BILIDIR <0.2 10/01/2021    IBILI see below 10/01/2021    LABALBU 2.6 10/01/2021       MICRO:  10/7 Surg cult: Gs 1+WBC, no organsim    10/2 Wound (not know if L or R): light Ps aeruginosa (R cipro), light 2nd E coli, light Enterococcus faecalis  Pseudomonas aeruginosa (1)  Antibiotic Interpretation ISAAC   cefepime Sensitive 8 mcg/mL   ciprofloxacin Resistant >2 mcg/mL   gentamicin Sensitive <=4 mcg/mL   meropenem Sensitive <=1 mcg/mL   piperacillin-tazobactam Sensitive <=16 mcg/mL   tobramycin Sensitive <=4 mcg/mL     Escherichia coli   Antibiotic Interpretation ISAAC   amoxicillin-clavulanate Intermediate 16/8 mcg/mL   ampicillin Resistant >16 mcg/mL   ceFAZolin Resistant >16 mcg/mL   cefepime Sensitive <=2 mcg/mL   cefTRIAXone Sensitive <=1 mcg/mL   cefuroxime Sensitive 8 mcg/mL   ciprofloxacin Resistant >2 mcg/mL   ertapenem Sensitive <=0.5 mcg/mL   gentamicin Sensitive <=4 mcg/mL   meropenem Sensitive <=1 mcg/mL   piperacillin-tazobactam Sensitive <=16 mcg/mL   trimethoprim-sulfamethoxazole Sensitive <=2/38 mcg/mL     Enterococcus  faecalis   Antibiotic Interpretation ISAAC   ampicillin Sensitive <=2 mcg/mL   vancomycin Sensitive 2 mcg/mL       IMAGING:  10/3 L foot MRI  Impression   Osteomyelitis involving the fifth metatarsal and fifth proximal phalanx with extensive osseous destruction. Mild osteomyelitis involving the lateral aspect of the cuboid. Prior partial first digit amputation     10/3 R foot MRI   Impression   Multiple prior amputations. Soft tissue ulceration lateral to the cuboid with mild acute osteomyelitis involving the lateral base of the fourth metatarsal and more distal plantar aspect of the remaining fourth metatarsal shaft as well as the lateral aspect of the cuboid.        Scheduled Meds:   heparin (porcine)  5,000 Units SubCUTAneous 3 times per day    [START ON 10/9/2021] metoprolol succinate  50 mg Oral Daily    hydrALAZINE  50 mg Oral 3 times per day    lidocaine 1 % injection  5 mL IntraDERmal Once    sodium chloride flush  5-40 mL IntraVENous 2 times per day    hydrALAZINE  10 mg IntraVENous Once    vancomycin  750 mg IntraVENous Q24H    meropenem  1,000 mg IntraVENous Q12H    insulin glargine  12 Units SubCUTAneous Nightly    insulin radiologic images - reviewed MRI with Radiologist 10/4  Microbiology cultures and other micro tests reviewed      Discussed with pt, Podiatry Resident  Call with ID issues over weekend  Harjinder Montes De Oca MD

## 2021-10-08 NOTE — PROGRESS NOTES
Occupational and Physical Therapy  Attempt/Discharge    OT/PT referral received and appreciated. Pt continues to remain on bedrest with planned return to OR for delayed primary closure to BLEs. Will sign off at this time and await post op orders.        Electronically signed by Mello Castro OTR/L on 10/8/2021 at 1:11 PM   Patience Cavazos DPT

## 2021-10-09 NOTE — PROGRESS NOTES
First unit of PRBCs infusing. Pt remains lethargic but arousable, BP is ranging from 90//64 with vitals checks. Will continue to monitor per protocol.

## 2021-10-09 NOTE — PROGRESS NOTES
Level Interpretation   10/2 1401 2250 mg IV x1     10/3 0712   Random = 24.5 mcg/mL Drawn ~19 hrs after previous dose given. Hold dose. 10/4 0829  Random =18.1 mcg/mL  Re-dose 500 mg IV x1    1231 500 mg IV x1     10/5 0307  Random = 18.8 mcg/mL Drawn 14.5 hrs after dose    1231 500 mg IV x1  Re-dose with 500 mg IV x 1   10/6 0849  Random = 18.6 mcg/mL Drawn 18h after dose. Will re-dose 500 mg IV x1    1511 500 mg IV x 1     10/7 0520  Random = 15.6 mcg/mL Switch to AUC-based dosing. Schedule vancomycin 750 mg IV Q24 hours and re-assess random 10/8.    1705 750 mg IV x 1     10/8 0454  Random = 18.6 mcg/mL Continue current regimen of 750 mg IV Q24h. Predicted to achieve AUC of 538 mg/L*hr.   10/8 15:25 750mg IV x1     10/9 14:18  Random = 18.6 mcg/mL · Drawn      Cultures & Sensitivities:    Date Site Micro Susceptibility / Result   10/2 Foot wound Pseudomonas aeruginosa     S: cefepime, gent, meropenem, Zosyn, tobramycin  R: Cipro     Foot wound E. coli  S: Cefepime, ceftriaxone, cefuroxime, ertapenem, gent, meropenem, Zosyn, Bactrim  R: Cipro    Foot wound  Enterococcus faecalis S: Ampicillin, Vancomycin      Foot wound Providencia stuartii    10/7 Tissue foot GNR      Labs / Ancillary Data:    Estimated Creatinine Clearance: 24 mL/min (A) (based on SCr of 2.8 mg/dL (H)).     Recent Labs     10/07/21  0520 10/08/21  0454 10/09/21  0510   CREATININE 1.9* 2.2* 2.8*   BUN 37* 37* 44*   WBC 6.2 9.6 9.1

## 2021-10-09 NOTE — PROGRESS NOTES
leg     Neuropathy 2009    polyneuropathy    Pancreatitis 2004    Scalp lesion 2007    Tobacco abuse 2008    Uses walker     Uses wheelchair     also uses walker    Wears dentures        Past Surgical History:   Procedure Laterality Date     SECTION  unknown    FOOT DEBRIDEMENT Right 2019    INCISION AND DRAINAGE WITH APPLICATION OF STRAVIX GRAFT RIGHT FOOT performed by Grayson Matthews DPM at [address] [place] [address] Right 2019    RIGHT FOOT INCISION AND DRAINAGE WITH STAGING TRANSMETATARSAL AMPUTATION performed by Grayson Matthews DPM at [address] [place] [address] Right 2019    RIGHT FOOT DEBRIDEMENT INCISION AND DRAINAGE, OPEN DIABETIC FOOT ULCER WITH GRAFT PLACEMENT performed by Grayson Matthews DPM at [address] [place] [address] Left 10/23/2019    LEFT FOOT INCISION AND DRAINAGE , DEBRIDEMENT OF OPEN WOUND, APPLICATION OF STRAVIX GRAFT performed by Grayson Matthews DPM at [address] [place] [address] Right 3/29/2021    INCISION AND DRAINAGE, DEBRIDEMENT OF DIABETIC WOUND WITH PLACEMENT OF STRAVIX GRAFT RIGHT FOOT performed by Grayson Matthews DPM at [address] [place] [address]  10/7/2021    BILATERAL LOWER EXTREMITY INCISION AND DRAINAGE, RIGHT FOOT 4TH RAY RESECTION, LEFT FOOT 5TH RAY RESECTION performed by Grayson Matthews DPM at [address] IR TUNNELED CATHETER PLACEMENT GREATER THAN 5 YEARS  10/5/2021    IR TUNNELED CATHETER PLACEMENT GREATER THAN 5 YEARS 10/5/2021 [facility] SPECIAL PROCEDURES    KNEE SURGERY Left     from falling off ladder -- has screws in place pt report    OTHER SURGICAL HISTORY Left 2016    I & D left foot    OTHER SURGICAL HISTORY Right 10/20/2017    RIGHT GASTROC LENGTHENING ENDOSCOPIC, INJECTION OF AMNI GRAFT    OTHER SURGICAL HISTORY Right 2018    Diabetic foot ulcer I&D w/ integra graft application    HI DEBRIDEMENT, SKIN, SUB-Q TISSUE,MUSCLE,BONE,=<20 SQ CM Right 2018    RIGHT FOOT DEBRIDEMENT INCISION AND DRAINAGE, PARTIAL 5TH RAY AMPUTATION performed by Beau Tony DPM at [facility] / [address]  7/7003    cryotherapy done on lesion    TOE AMPUTATION Left 02/24/2017    AMPUTATION LEFT GREAT TOE                 TONSILLECTOMY         History reviewed. No pertinent family history. reports that she quit smoking about 3 years ago. Her smoking use included cigarettes. She has a 30.00 pack-year smoking history. She has never used smokeless tobacco. She reports that she does not drink alcohol and does not use drugs.         Allergies:  Sulfa antibiotics    Current Medications:    magnesium sulfate 2000 mg in 50 mL IVPB premix, Once  [Held by provider] heparin (porcine) injection 5,000 Units, 3 times per day  metoprolol succinate (TOPROL XL) extended release tablet 50 mg, Daily  hydrALAZINE (APRESOLINE) tablet 50 mg, 3 times per day  hydrALAZINE (APRESOLINE) injection 5 mg, Q6H PRN  0.9 % sodium chloride infusion, PRN  lidocaine PF 1 % injection 5 mL, Once  sodium chloride flush 0.9 % injection 5-40 mL, 2 times per day  sodium chloride flush 0.9 % injection 5-40 mL, PRN  0.9 % sodium chloride infusion, PRN  labetalol (NORMODYNE;TRANDATE) injection syringe 10 mg, Q4H PRN  hydrALAZINE (APRESOLINE) injection 10 mg, Once  vancomycin (VANCOCIN) 750 mg in dextrose 5 % 250 mL IVPB, Q24H  0.9 % sodium chloride infusion, PRN  meropenem (MERREM) 1,000 mg in sodium chloride 0.9 % 100 mL IVPB (mini-bag), Q12H  glucose (GLUTOSE) 40 % oral gel 15 g, PRN  dextrose 50 % IV solution, PRN  glucagon (rDNA) injection 1 mg, PRN  dextrose 5 % solution, PRN  insulin glargine (LANTUS;BASAGLAR) injection pen 12 Units, Nightly  insulin lispro (1 Unit Dial) 0-18 Units, TID WC  insulin lispro (1 Unit Dial) 0-9 Units, Nightly  fluconazole (DIFLUCAN) 200 mg IVPB, Q24H  albuterol (PROVENTIL) nebulizer solution 2.5 mg, Q6H PRN  amitriptyline (ELAVIL) tablet 100 mg, Nightly  aspirin EC tablet 81 mg, Daily  atorvastatin 26   BUN 37*  --  37*  --   --   --  44*   CREATININE 1.9*  --  2.2*  --   --   --  2.8*   GLUCOSE 208*  --  141*  --   --   --  109*    < > = values in this interval not displayed. Ca/Mg/Phos:   Recent Labs     10/07/21  0520 10/08/21  0454 10/09/21  0510   CALCIUM 8.6 8.6 8.6   MG 1.90 1.80 1.70*     Hepatic:   No results for input(s): AST, ALT, ALB, BILITOT, ALKPHOS in the last 72 hours. Troponin:   No results for input(s): TROPONINI in the last 72 hours. BNP: No results for input(s): BNP in the last 72 hours. Lipids: No results for input(s): CHOL, TRIG, HDL, LDLCALC, LABVLDL in the last 72 hours. ABGs: No results for input(s): PHART, PO2ART, JWA7IST in the last 72 hours. INR:   No results for input(s): INR in the last 72 hours. UA:  No results for input(s): Thomasene Chris, GLUCOSEU, BILIRUBINUR, KETUA, SPECGRAV, BLOODU, PHUR, PROTEINU, UROBILINOGEN, NITRU, LEUKOCYTESUR, Kennedy Scale in the last 72 hours. Urine Microscopic:   No results for input(s): LABCAST, BACTERIA, COMU, HYALCAST, WBCUA, RBCUA, EPIU in the last 72 hours. Urine Culture: No results for input(s): LABURIN in the last 72 hours. Urine Chemistry:   No results for input(s): Marcelino Sloop, PROTEINUR, NAUR in the last 72 hours. IMAGING:  XR FOOT RIGHT (2 VIEWS)   Final Result      Expected postsurgical changes of right fourth ray and partial cuboid resection. XR FOOT LEFT (2 VIEWS)   Final Result      Expected postsurgical changes of left fifth ray resection. VL Extremity Venous Bilateral   Final Result      IR TUNNELED CVC PLACE WO SQ PORT/PUMP > 5 YEARS   Final Result   Impression:      Successful placement of a tunneled central venous catheter via the right internal jugular vein. The catheter is ready for immediate use.       VL DUP LOWER EXTREMITY ARTERIES BILATERAL   Final Result      MRI FOOT LEFT WO CONTRAST   Final Result   Osteomyelitis involving the fifth metatarsal and fifth proximal phalanx with extensive osseous destruction. Mild osteomyelitis involving the lateral aspect of the cuboid. Prior partial first digit amputation. MRI FOOT RIGHT WO CONTRAST   Final Result   Multiple prior amputations. Soft tissue ulceration lateral to the cuboid with mild acute osteomyelitis involving the lateral base of the fourth metatarsal and more distal plantar aspect of the remaining fourth metatarsal shaft as well as the lateral aspect of the cuboid. XR FOOT LEFT (MIN 3 VIEWS)   Final Result      Numerous osteotomies with soft tissue swelling and possible ulcer lateral aspect of the right midfoot. No plain radiographic evidence for osteomyelitis, however plain film findings for osteomyelitis are often late findings. 3 views left foot      FINDINGS:      There is a mottled appearance of the phalanges of the right fifth digit as well as the fifth metatarsal. The remaining metatarsals unremarkable in appearance. Patient's had prior osteotomy of the first distal phalangeal tuft. IMPRESSION:      Plain radiographic evidence for osteomyelitis involving the fifth phalanges and fifth metatarsal. Patient's bones are osteopenic. XR FOOT RIGHT (MIN 3 VIEWS)   Final Result      Numerous osteotomies with soft tissue swelling and possible ulcer lateral aspect of the right midfoot. No plain radiographic evidence for osteomyelitis, however plain film findings for osteomyelitis are often late findings. 3 views left foot      FINDINGS:      There is a mottled appearance of the phalanges of the right fifth digit as well as the fifth metatarsal. The remaining metatarsals unremarkable in appearance. Patient's had prior osteotomy of the first distal phalangeal tuft. IMPRESSION:      Plain radiographic evidence for osteomyelitis involving the fifth phalanges and fifth metatarsal. Patient's bones are osteopenic. XR CHEST PORTABLE   Final Result      Mild cardiomegaly and mild interstitial edema. Assessment/Plan :      1. Lin onc CKD 4   LIN 2/2 multiple factors   BP low   Stop cardura   Saline bolus   Stop lasix     2. HTN. BP controlled   - on hydralazine    3. Acute on chronic CHF   - stable     4. DM 2. Needs better control     5. Electrolytes. Monitor and replace.      6. Diabetic foot infection with necrotic ulcer   Renal dose abx   ID following       Recommend to dose adjust all medications  based on renal functions  Maintain SBP> 90 mmHg   Daily weights   AVOID NSAIDs  Avoid Nephrotoxins  Monitor Intake/Output  Call if significant decrease in urine output           Thank you for allowing us to participate in care of Buck Llanes         Electronically signed by: Gisele Vega MD, 10/9/2021, 8:50 AM      [facility] [address]  Office : [phone]  Fax :[phone]

## 2021-10-09 NOTE — PROGRESS NOTES
Podiatric Surgery Daily Progress Note  Sita Ruff      Subjective :   Patient seen and examined this am at the bedside. Patient denies any acute overnight events. Patient denies N/V/F/C/SOB. Patient denies calf pain, thigh pain, chest pain. Review of Systems: A 12 point review of symptoms is unremarkable with the exception of the chief complaint. Patient specifically denies nausea, fever, vomiting, chills, shortness of breath, chest pain, abdominal pain, constipation or difficulty urinating. Objective     /85   Pulse 66   Temp 97.5 °F (36.4 °C) (Axillary)   Resp 16   Ht 5' 2\" (1.575 m)   Wt 213 lb 6.5 oz (96.8 kg)   LMP 10/23/2015   SpO2 94%   BMI 39.03 kg/m²      I/O:    Intake/Output Summary (Last 24 hours) at 10/9/2021 0653  Last data filed at 10/9/2021 0100  Gross per 24 hour   Intake 2456.7 ml   Output 700 ml   Net 1756.7 ml              Wt Readings from Last 3 Encounters:   10/09/21 213 lb 6.5 oz (96.8 kg)   08/12/21 200 lb (90.7 kg)   06/18/21 218 lb 0.6 oz (98.9 kg)       LABS:    Recent Labs     10/08/21  0454 10/08/21  1330 10/09/21  0301 10/09/21  0510   WBC 9.6  --   --  9.1   HGB 7.5*   < > 8.6* 8.8*   HCT 22.6*   < > 26.1* 26.8*     --   --  263    < > = values in this interval not displayed. Recent Labs     10/09/21  0510   *   K 4.8      CO2 26   BUN 44*   CREATININE 2.8*        No results for input(s): PROT, INR, APTT in the last 72 hours. LOWER EXTREMITY EXAMINATION    Dressing to bilateral LE intact. No strikethrough noted to the external dressing. Moderate sanguinous drainage noted to the internal layers of the dressing.      VASCULAR: DP and PT pulses nonpalpable bilateral.  Upon hand-held Doppler examination, DP and PT signals weakly biphasic bilateral. CFT is brisk to the distal aspect of the foot bilateral. Skin temperature is warm to cool from proximal to distal with no focal calor noted.  +2 pitting edema noted bilateral.  No pain with calf compression b/l.     NEUROLOGIC: Gross and epicritic sensation is diminished b/l. Protective sensation is diminished at all pedal sites b/l. DERMATOLOGIC:   Right lower extremity:  Full-thickness ulceration noted to the plantar aspect beginning distally at the level of the previous fourth metatarsal head and extending proximally to the cuboid with cuboid bone exposed. Retention sutures intact. Surgifoam left intact to proximal aspect of wound bed. Wound base granular. No fluctuance, crepitus, active drainage, erythema, or malodor noted. No dehiscence noted to wound edges. Left lower extremity:  Full-thickness ulceration noted plantar lateral aspect beginning at the base of the fourth digit and extending proximally towards the base of the fourth metatarsal and laterally towards the third metatarsal with fourth metatarsal base and cuboid bone exposed.  Retention sutures intact. Surgifoam left intact to wound bed. Wound base granular. No fluctuance, crepitus, active drainage, erythema, or malodor noted. No dehiscence noted to wound edges. MUSCULOSKELETAL: Muscle strength is 4/5 for all pedal groups tested. No pain with palpation of the foot or ankle b/l. Ankle joint ROM is decreased in dorsiflexion with the knee extended.  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 S/p left fifth ray resection and right fourth ray and partial cuboid resection.     IMAGING:  X-ray right foot 10/7/2021-postop  Narrative   EXAM: XR FOOT RIGHT (2 VIEWS)       INDICATION:  s/p 4th ray resection and partial cuboid resection; packed open       COMPARISON: 10/3/2021, 10/2/2021       FINDINGS:       There are postsurgical changes of fourth ray resection and partial cuboid resection. Postsurgical changes of prior fifth ray resection and first, second, and third toe resections again seen.           Impression       Expected postsurgical changes of right fourth ray and partial cuboid resection. X-ray left foot 10/7/2021-postop  Narrative   EXAM: XR FOOT LEFT (2 VIEWS)       INDICATION:  s/p 5th ray resection & cuboid bone biopsy; packed open       COMPARISON: 10/3/2021, 10/2/2021       FINDINGS:       There are postsurgical changes of fifth ray resection. There is a small amount of postsurgical subcutaneous gas. Redemonstration of prior amputation of the first digit at the level of the base of the first proximal phalanx again seen.           Impression       Expected postsurgical changes of left fifth ray resection. MRI right foot 10/3/2021  Narrative   EXAMINATION: Magnetic resonance imaging (MRI) of the right foot without contrast       HISTORY: Osteomyelitis 5th metatarsal base; rule out OM cuboid       TECHNIQUE: MRI of the right foot was performed using multiple pulse sequences in multiple planes without contrast.       COMPARISON: Radiograph dated 10/2/2021       FINDINGS:        There have been prior first second third MTP joint, fourth transmetatarsal, and fifth TMT joint amputations. There is a soft tissue ulcer lateral to the base of the fourth metatarsal with adjacent T1 hypointensity in marrow edema involving the base of    the fourth metatarsal as well as the lateral aspect of the cuboid representing osteomyelitis. There is also soft tissue swelling more distally along the plantar aspect of the remaining fourth metatarsal shafts with osteomyelitis along the plantar cortex    of the remaining fourth metatarsal. There is no acute fracture or dislocation. There is a degenerative subchondral cyst in the navicular. No abscess is seen within limits of noncontrast examination. Visualized tendons and plantar fascia are unremarkable.           Impression   Multiple prior amputations.    Soft tissue ulceration lateral to the cuboid with mild acute osteomyelitis involving the lateral base of the fourth metatarsal and more distal plantar aspect of the remaining fourth metatarsal shaft as well as the lateral aspect of the cuboid.        MRI left foot 10/3/2021  Narrative   EXAMINATION: Magnetic resonance imaging (MRI) of the left foot without contrast       HISTORY: Osteomyelitis 5th metatarsal       TECHNIQUE: MRI of the left foot was performed using multiple pulse sequences in multiple planes without contrast.       COMPARISON: Radiograph dated 10/2/2021       FINDINGS:        There has been prior amputation of the first digit at the level of the base of the first proximal phalanx. There is soft tissue ulceration lateral to the fifth mid metatarsal with surrounding edema and phlegmon in the subcutaneous tissues. There is    extensive destruction of the mid to distal aspect of the fifth metatarsal and base of the fifth proximal phalanx representing sequelae of osteomyelitis. There is also mild osteomyelitis extending to the base of the fifth metatarsal as well as along the    lateral aspect of the cuboid.           Impression   Osteomyelitis involving the fifth metatarsal and fifth proximal phalanx with extensive osseous destruction. Mild osteomyelitis involving the lateral aspect of the cuboid. Prior partial first digit amputation.      Lower extremity arterial duplex 10/4/2021-preliminary  Right   Right CRISTIAN was not available due to patient non-compliance . There are multiphasic waveforms in the common femoral artery indicating no   aortoiliac inflow disease. There is atherosclerotic plaque involving the superficial femoral and   popliteal arteries with no significantly elevated velocities. Left   Left CRISTIAN was not available due to patient non-compliance . There are multiphasic waveforms in the common femoral artery indicating no   aortoiliac inflow disease. There is atherosclerotic plaque involving the superficial femoral and   popliteal arteries with no significantly elevated velocities. The peroneal artery is not visualized. There is no previous exam for comparison.      ASSESSMENT/PLAN  -S/p bilateral lower extremity I&D with left fifth ray resection, right fourth ray resection, right partial cuboid resection, 10/7/2021  -Diabetic foot ulceration with osteomyelitis, Carpenter 3, right lower extremity  -Diabetic foot ulceration with osteomyelitis, Carpenter 3, left lower extremity  -PVD, bilateral lower extremity  -Edema, bilateral lower extremity  -Diabetes mellitus type 2 with peripheral neuropathy  -History of noncompliance with weightbearing status    -Patient examined and evaluated at the bedside   -VSS.  No leukocytosis noted. -ESR >120, CRP 205.1  -Imaging reviewed, noted above  -Bone biopsy taken right foot, sent for pathology  -Bone biopsy taken of the left cuboid, sent for pathology  -Wound culture right foot: Pseudomonas aeruginosa, E coli, Enterococcus faecalis (10/2)  -Dressing applied to bilateral lower extremity consisting of gauze, ABD, DSD, Ace bandage  -Continue antibiotics per ID recommendations.  -Non-weightbearing bilateral lower extremity  -Lengthy discussion with patient about intraoperative findings of right cuboid bone being soft and likelihood of it being infected. Discussed with patient that cuboid may not be salvageable at this time. Discussed with patient that removal of cuboid will leave her with a very unstable foot. Patient understands but insists that she does not want a more proximal amputation at this time. Discussed options pending bone biopsy of cuboid including further surgical resection versus attempted IV antibiotics. Discussed with patient that IV antibiotics may not be enough to treat the infected bone at this time. Patient understands but insists that she does not want a more proximal amputation. DISPO: S/p bilateral lower extremity I&D with fifth ray resection left foot, fourth ray resection right foot, partial cuboid resection right foot.   Patient will be brought back to the OR for delayed primary closure this hospital admission pending biopsy results and surgical clearance per medicine team.  Continue antibiotics per ID recommendations. Will follow up on bone biopsies for bilateral lower extremity.     Discussed assessment and plan with Dr. Radha Moon DPM.    Ruben Pleitez [facility]  10/09/21  6:53 AM

## 2021-10-09 NOTE — PROGRESS NOTES
Progress Note    Admit Date: 10/1/2021  Day: 8  Diet: Adult Oral Nutrition Supplement; Wound Healing Oral Supplement  ADULT DIET; Regular; 3 carb choices (45 gm/meal); Low Sodium (2 gm); Low Potassium (Less than 3000 mg/day); 2000 ml  Adult Oral Nutrition Supplement; Renal Oral Supplement    CC: B/L leg pain for 2-6 weeks    Interval history:   NAONE. Patient was seen and examined this am. Patient was hypotensive yesterday. H/H was <7. Received 2 units RBCs. More drowsy yesterday but still arousable and answering appropriately. Today, she was sitting in bed comfortably. Alert, oriented x4. Reports foot pain is under control. Denies SOB, CP, abdominal pain, c/d or urinary symptoms. HPI:   Cathryne Soda. Jose Villanueva is a 61 yo F with PMH CKD4, DVT (2004; on Eqliquis), T2DM c/b b/l lower extremity ulcers (follows with Podiatry outpatient, last visit 9/27/21), HTN, narcotic-dependent chronic pain (on Methadone 140 mg/day) who presented for progressively worsening b/l lower extremity pain for the 2 weeks. Patient states she presented today with pain in legs so severe that it is now inhibiting her ability to ambulate. She endorses that the legs are also more swollen and warm to touch with pain when she touches it. She endorses she tries to eat a healthy diet low in salt, she also denies any major weight fluctuations recently.     On ROS, denies fever/chills, nausea/vomiting, diarrhea/constipation, urinary issues including dysuria, hematuria, or changes to amount or flow of urine. She denies abdominal pain.      On arrival to [facility] ED, VSS. Patient appeared extremely sleepy on physical exam. Chemistry significant for Na 133, BUN 42/Cr 2.5. Pro-BNP elevated 4,733 (baseline 2-3k). Trops 0.04. CBC with Hgb 9.1 Hct 28.5 at baseline. CXR with mild cardiomegaly and interstitial edema.  She was given IV Lasix 40 in the ED and admitted to the floor.        Medications:     Scheduled Meds:   magnesium sulfate  2,000 mg IntraVENous Once    [Held by provider] heparin (porcine)  5,000 Units SubCUTAneous 3 times per day    metoprolol succinate  50 mg Oral Daily    hydrALAZINE  50 mg Oral 3 times per day    lidocaine 1 % injection  5 mL IntraDERmal Once    sodium chloride flush  5-40 mL IntraVENous 2 times per day    hydrALAZINE  10 mg IntraVENous Once    vancomycin  750 mg IntraVENous Q24H    meropenem  1,000 mg IntraVENous Q12H    insulin glargine  12 Units SubCUTAneous Nightly    insulin lispro  0-18 Units SubCUTAneous TID WC    insulin lispro  0-9 Units SubCUTAneous Nightly    fluconazole  200 mg IntraVENous Q24H    amitriptyline  100 mg Oral Nightly    aspirin EC  81 mg Oral Daily    atorvastatin  20 mg Oral Nightly    escitalopram  10 mg Oral Daily    gabapentin  400 mg Oral BID    methadone  140 mg Oral Daily    pantoprazole  40 mg Oral Daily    sodium chloride flush  5-40 mL IntraVENous 2 times per day    influenza virus vaccine  0.5 mL IntraMUSCular Prior to discharge     Continuous Infusions:   sodium chloride      sodium chloride 25 mL (10/09/21 0748)    sodium chloride      dextrose      sodium chloride 25 mL (10/08/21 1722)     PRN Meds:hydrALAZINE, sodium chloride, sodium chloride flush, sodium chloride, labetalol, sodium chloride, glucose, dextrose, glucagon (rDNA), dextrose, albuterol, sodium chloride flush, sodium chloride, ondansetron **OR** ondansetron, polyethylene glycol, acetaminophen **OR** acetaminophen    Objective:   Vitals:   T-max:  Patient Vitals for the past 8 hrs:   BP Temp Temp src Pulse Resp SpO2 Weight   10/09/21 0911 131/68 98.2 °F (36.8 °C) Oral 81 18 94 %    10/09/21 0700 128/65 97.3 °F (36.3 °C) Oral 76 18 93 %    10/09/21 0613       213 lb 6.5 oz (96.8 kg)   10/09/21 0524 128/85 97.5 °F (36.4 °C) Axillary 66 16 94 %    10/09/21 0158 112/76 97.3 °F (36.3 °C) Axillary 71 16 91 %        Intake/Output Summary (Last 24 hours) at 10/9/2021 0929  Last data filed at 10/9/2021 [facility]  Gross per 24 hour   Intake 2496.7 ml   Output 1000 ml   Net 1496.7 ml     ROS as above    Physical Exam  Physical Exam  Constitutional:       General: She is not in acute distress. HENT:      Head: Normocephalic and atraumatic. Nose: No congestion or rhinorrhea. Mouth/Throat:      Mouth: Mucous membranes are moist.      Pharynx: No oropharyngeal exudate or posterior oropharyngeal erythema. Eyes:      General: No scleral icterus. Conjunctiva/sclera: Conjunctivae normal.   Cardiovascular:      Rate and Rhythm: Normal rate and regular rhythm. Heart sounds: No murmur heard. No gallop. Pulmonary:      Effort: No respiratory distress. Breath sounds: No wheezing or rales. Abdominal:      General: There is no distension. Palpations: Abdomen is soft. Tenderness: There is no abdominal tenderness. There is no guarding. Musculoskeletal:      Cervical back: Neck supple. No tenderness. Right lower leg: Edema present. Left lower leg: Edema present. Comments: Chronic venous changes on b/l LE, mild tenderness, significant swelling. B/L feet with wounds, not purulent    Skin:     General: Skin is dry. Capillary Refill: Capillary refill takes less than 2 seconds. Neurological:      General: No focal deficit present. Mental Status: She is alert and oriented to person, place, and time. LABS:    CBC:   Recent Labs     10/07/21  0520 10/07/21  1157 10/08/21  0454 10/08/21  0454 10/08/21  1330 10/09/21  0301 10/09/21  0510   WBC 6.2  --  9.6  --   --   --  9.1   HGB 6.9*   < > 7.5*   < > 6.8* 8.6* 8.8*   HCT 21.3*   < > 22.6*   < > 20.1* 26.1* 26.8*     --  324  --   --   --  263   MCV 80.8  --  83.4  --   --   --  85.1    < > = values in this interval not displayed.      Renal:    Recent Labs     10/07/21  0520 10/07/21  0520 10/08/21  0454 10/08/21  0454 10/08/21  1015 10/09/21  0301 10/09/21  0510     --  139  --   --   --  135*   K 4.6   < > 5.8* < > 5.7* 5.0 4.8     --  104  --   --   --  102   CO2 27  --  27  --   --   --  26   BUN 37*  --  37*  --   --   --  44*   CREATININE 1.9*  --  2.2*  --   --   --  2.8*   GLUCOSE 208*  --  141*  --   --   --  109*   CALCIUM 8.6  --  8.6  --   --   --  8.6   MG 1.90  --  1.80  --   --   --  1.70*   ANIONGAP 8  --  8  --   --   --  7    < > = values in this interval not displayed. Hepatic:   No results for input(s): AST, ALT, BILITOT, BILIDIR, PROT, LABALBU, ALKPHOS in the last 72 hours. Troponin:   No results for input(s): TROPONINI in the last 72 hours. BNP: No results for input(s): BNP in the last 72 hours. Lipids: No results for input(s): CHOL, HDL in the last 72 hours. Invalid input(s): LDLCALCU, TRIGLYCERIDE  ABGs:  No results for input(s): PHART, WAO0SXS, PO2ART, UGP4ZZX, BEART, THGBART, G6IEEIIC, DGZ6KOL in the last 72 hours. INR:   No results for input(s): INR in the last 72 hours. Lactate: No results for input(s): LACTATE in the last 72 hours. Cultures:  -----------------------------------------------------------------  RAD:   XR FOOT RIGHT (2 VIEWS)   Final Result      Expected postsurgical changes of right fourth ray and partial cuboid resection. XR FOOT LEFT (2 VIEWS)   Final Result      Expected postsurgical changes of left fifth ray resection. VL Extremity Venous Bilateral   Final Result      IR TUNNELED CVC PLACE WO SQ PORT/PUMP > 5 YEARS   Final Result   Impression:      Successful placement of a tunneled central venous catheter via the right internal jugular vein. The catheter is ready for immediate use. VL DUP LOWER EXTREMITY ARTERIES BILATERAL   Final Result      MRI FOOT LEFT WO CONTRAST   Final Result   Osteomyelitis involving the fifth metatarsal and fifth proximal phalanx with extensive osseous destruction. Mild osteomyelitis involving the lateral aspect of the cuboid. Prior partial first digit amputation.       MRI FOOT RIGHT WO CONTRAST   Final Result Multiple prior amputations. Soft tissue ulceration lateral to the cuboid with mild acute osteomyelitis involving the lateral base of the fourth metatarsal and more distal plantar aspect of the remaining fourth metatarsal shaft as well as the lateral aspect of the cuboid. XR FOOT LEFT (MIN 3 VIEWS)   Final Result      Numerous osteotomies with soft tissue swelling and possible ulcer lateral aspect of the right midfoot. No plain radiographic evidence for osteomyelitis, however plain film findings for osteomyelitis are often late findings. 3 views left foot      FINDINGS:      There is a mottled appearance of the phalanges of the right fifth digit as well as the fifth metatarsal. The remaining metatarsals unremarkable in appearance. Patient's had prior osteotomy of the first distal phalangeal tuft. IMPRESSION:      Plain radiographic evidence for osteomyelitis involving the fifth phalanges and fifth metatarsal. Patient's bones are osteopenic. XR FOOT RIGHT (MIN 3 VIEWS)   Final Result      Numerous osteotomies with soft tissue swelling and possible ulcer lateral aspect of the right midfoot. No plain radiographic evidence for osteomyelitis, however plain film findings for osteomyelitis are often late findings. 3 views left foot      FINDINGS:      There is a mottled appearance of the phalanges of the right fifth digit as well as the fifth metatarsal. The remaining metatarsals unremarkable in appearance. Patient's had prior osteotomy of the first distal phalangeal tuft. IMPRESSION:      Plain radiographic evidence for osteomyelitis involving the fifth phalanges and fifth metatarsal. Patient's bones are osteopenic. XR CHEST PORTABLE   Final Result      Mild cardiomegaly and mild interstitial edema. Assessment/Plan:      Horace Harding Cropwell is a 59 yo F with PMH CKD4, DVT (on Eqliquis), T2DM c/b b/l lower extremity ulcers (follows with Podiatry outpatient, last visit 9/27/21), HTN, history of pain medication addiction (on Methadone 140 mg/day) who presented for progressively worsening b/l lower extremity pain for the 2 weeks. Osteomyelitis of b/l feet  Recently debrided outpatient on 9/27/21, do not appear acutely infected. Patient on Clinda and Cipro. XR of both feet show osteomyelitis involving 5th phalanges and fifth metatarsal.   - Podiatry following  - wound care per Podiatry  - PICC line placed for abx on discharge  - Wound cultures grew pseudomonas, E coli and enterococcus  - Antibiotics per ID, vanc, merem, fluconazole  - Heparin ggt was held after surgery given significant bleeding in OR, requiring blood transfusion. H/H continued to drop (requiring transfusion) after patient was switched to heparin subq. Heparin was held altogether yesterday    FAHEEM on CKD 4  Baseline creat is appears around 2. Hypoalbuminemia, Albumin 2.6 on presentation. Suspect from diabetic nephropathy in setting of poorly controlled diabetes. HbA1c 10/2021 at 8.3.   - Worsening today, monitor renal function closely  - NS bolus  - Monitor I/Os  - Avoid nephrotoxic agents  - Nephrology following     Acute blood loss anemia requiring transfusions  Likely 2/2 blood loss in OR in the setting of AOCD from CKD. Iron 16, iron sat 9, transferrin 7.8, TIBC 174. Requiring 4 uRBC transfusion so far. - Monitor daily CBC  - Consider getting repeat H/H for signs of HD instability    Chronic diastolic Heart Failure  Last Echo 2018 with EF 50-55%. Evidence of PAH which may be contributing. CXR 10/1 with mild cardiomegaly and interstitial edema. Suspect rt heart failure from undiagnosed sleep apnea.  Echo with EF 55%, LVH and estimated pulm artery systolic pressure is at 60 mmHg   - Lasix 20 mg IV daily held for worsening FAHEEM  - strict I/O's, daily weights   - Low salt diet    Hyperkalemia (resolved)  - EKG unremarkable  - Continue to monitor daily RFP    Hypertension  - Hold parameters placed for low BP yesterday  - Hydralazine increased to 50 mg TID  - Metoprolol decreased to 50 mg   - Hold cardura for now     Hyponatremia (resolved)  Na 133 on admission.  - continue to monitor      T2DM   BS 91 - recent outpatient visit records show good blood sugar control. HbA1c 6.8 on 06/17/21. - HbA1c on  10/2021 at 8.3  - on wt loss, exercise, good diet and medication compliance     Hx of Opioid Pain Medication Use, Now on Methadone   - Continue Methadone 140 mg daily         Code Status: Full Code   FEN: Adult Oral Nutrition Supplement; Wound Healing Oral Supplement  ADULT DIET; Regular; 3 carb choices (45 gm/meal); Low Sodium (2 gm); Low Potassium (Less than 3000 mg/day); 2000 ml  Adult Oral Nutrition Supplement;  Renal Oral Supplement   PPX: heparin held  DISPO: Carlin Woodard MD, PGY-1  10/09/21  9:29 AM    This patient has been staffed and discussed with Ashok Ackerman MD.

## 2021-10-09 NOTE — PROGRESS NOTES
Office : [phone]     Fax :[phone]       Nephrology progress  Note      Patient's Name: Zahira Sharp    Reason for Consult:  FAHEEM on CKD 4       Requesting Physician:  Tyson Cottrell MD      Chief Complaint:    Chief Complaint   Patient presents with    Leg Pain     x 2 weeks. History of Present iIlness:    Zahria Sharp is a 60 y.o. female with past medical h/o CKD4, DVT, T2DM c/b b/l lower extremity ulcers  who presented for progressively worsening b/l lower extremity pain for the 2 weeks. Patient states she presented today with pain in legs so severe that it is now inhibiting her ability to ambulate. She endorses that the legs are also more swollen and warm to touch with pain when she touches it. She endorses she tries to eat a healthy diet low in salt, she also denies any major weight fluctuations recently.     Baseline creat is 2/0   Now elevated at 2.4        Interval hx :    Cr worse   K better   BP drop noted         I/O last 3 completed shifts: In: 2456.7 [P.O.:1020; I.V.:36.7;  Blood:900; IV [id number]:474]  Out: 0 [Urine:700]    Past Medical History:   Diagnosis Date    Asthma 05/14/2004    Bacterial vaginosis 04/2008    Carpal tunnel syndrome 05/2007    COPD (chronic obstructive pulmonary disease) ([address]     Diabetes mellitus type II 08/2007    10/1/20 pt states does accucheck 2x/day at home    Diabetic neuropathy ([address] 40/8093    Diastolic CHF ([address]     DVT (deep venous thrombosis) ([address] 03/2004    Dyslipidemia 05/2009    Dyspareunia 05/2009    ETOH abuse 03/04/2007    Feet clawing     HTN (hypertension)     Hx of blood clots     Hyperlipidemia     MRSA (methicillin resistant staph aureus) culture positive 11/06/2017; 11/17/2017    foot; leg  Neuropathy 2009    polyneuropathy    Pancreatitis 2004    Scalp lesion 2007    Tobacco abuse 2008    Uses walker     Uses wheelchair     also uses walker    Wears dentures        Past Surgical History:   Procedure Laterality Date     SECTION  unknown    FOOT DEBRIDEMENT Right 2019    INCISION AND DRAINAGE WITH APPLICATION OF STRAVIX GRAFT RIGHT FOOT performed by Georgie Mcardle, DPM at [address] [place] [address] Right 2019    RIGHT FOOT INCISION AND DRAINAGE WITH STAGING TRANSMETATARSAL AMPUTATION performed by Georgie Mcardle, DPM at [address] [place] [address] Right 2019    RIGHT FOOT DEBRIDEMENT INCISION AND DRAINAGE, OPEN DIABETIC FOOT ULCER WITH GRAFT PLACEMENT performed by Georgie Mcardle, DPM at [address] [place] [address] Left 10/23/2019    LEFT FOOT INCISION AND DRAINAGE , DEBRIDEMENT OF OPEN WOUND, APPLICATION OF STRAVIX GRAFT performed by Georgie Mcardle, DPM at [address] [place] [address] Right 3/29/2021    INCISION AND DRAINAGE, DEBRIDEMENT OF DIABETIC WOUND WITH PLACEMENT OF STRAVIX GRAFT RIGHT FOOT performed by Georgie Mcardle, DPM at [address] [place] [address]  10/7/2021    BILATERAL LOWER EXTREMITY INCISION AND DRAINAGE, RIGHT FOOT 4TH RAY RESECTION, LEFT FOOT 5TH RAY RESECTION performed by Georgie Mcardle, DPM at [address] IR TUNNELED CATHETER PLACEMENT GREATER THAN 5 YEARS  10/5/2021    IR TUNNELED CATHETER PLACEMENT GREATER THAN 5 YEARS 10/5/2021 [facility] SPECIAL PROCEDURES    KNEE SURGERY Left     from falling off ladder -- has screws in place pt report    OTHER SURGICAL HISTORY Left 2016    I & D left foot    OTHER SURGICAL HISTORY Right 10/20/2017    RIGHT GASTROC LENGTHENING ENDOSCOPIC, INJECTION OF AMNI GRAFT    OTHER SURGICAL HISTORY Right 2018    Diabetic foot ulcer I&D w/ integra graft application    RI DEBRIDEMENT, SKIN, SUB-Q TISSUE,MUSCLE,BONE,=<20 SQ CM Right 2018    RIGHT FOOT DEBRIDEMENT INCISION AND DRAINAGE, PARTIAL 5TH RAY AMPUTATION performed by Autumn Rodriguez DPM at [address] [place] / [address]  7/7003    cryotherapy done on lesion    TOE AMPUTATION Left 02/24/2017    AMPUTATION LEFT GREAT TOE                 TONSILLECTOMY         History reviewed. No pertinent family history. reports that she quit smoking about 3 years ago. Her smoking use included cigarettes. She has a 30.00 pack-year smoking history. She has never used smokeless tobacco. She reports that she does not drink alcohol and does not use drugs.         Allergies:  Sulfa antibiotics    Current Medications:    0.9 % sodium chloride bolus, Once  [Held by provider] heparin (porcine) injection 5,000 Units, 3 times per day  metoprolol succinate (TOPROL XL) extended release tablet 50 mg, Daily  hydrALAZINE (APRESOLINE) tablet 50 mg, 3 times per day  hydrALAZINE (APRESOLINE) injection 5 mg, Q6H PRN  0.9 % sodium chloride infusion, PRN  lidocaine PF 1 % injection 5 mL, Once  sodium chloride flush 0.9 % injection 5-40 mL, 2 times per day  sodium chloride flush 0.9 % injection 5-40 mL, PRN  0.9 % sodium chloride infusion, PRN  labetalol (NORMODYNE;TRANDATE) injection syringe 10 mg, Q4H PRN  hydrALAZINE (APRESOLINE) injection 10 mg, Once  vancomycin (VANCOCIN) 750 mg in dextrose 5 % 250 mL IVPB, Q24H  0.9 % sodium chloride infusion, PRN  meropenem (MERREM) 1,000 mg in sodium chloride 0.9 % 100 mL IVPB (mini-bag), Q12H  glucose (GLUTOSE) 40 % oral gel 15 g, PRN  dextrose 50 % IV solution, PRN  glucagon (rDNA) injection 1 mg, PRN  dextrose 5 % solution, PRN  insulin glargine (LANTUS;BASAGLAR) injection pen 12 Units, Nightly  insulin lispro (1 Unit Dial) 0-18 Units, TID WC  insulin lispro (1 Unit Dial) 0-9 Units, Nightly  fluconazole (DIFLUCAN) 200 mg IVPB, Q24H  albuterol (PROVENTIL) nebulizer solution 2.5 mg, Q6H PRN  amitriptyline (ELAVIL) tablet 100 mg, Nightly  aspirin EC tablet 81 mg, Daily  atorvastatin (LIPITOR) tablet 20 mg, Nightly  escitalopram (LEXAPRO) tablet 10 mg, Daily  gabapentin (NEURONTIN) capsule 400 mg, BID  methadone (DOLOPHINE) tablet 140 mg, Daily  pantoprazole (PROTONIX) tablet 40 mg, Daily  sodium chloride flush 0.9 % injection 5-40 mL, PRN  0.9 % sodium chloride infusion, PRN  ondansetron (ZOFRAN-ODT) disintegrating tablet 4 mg, Q8H PRN   Or  ondansetron (ZOFRAN) injection 4 mg, Q6H PRN  polyethylene glycol (GLYCOLAX) packet 17 g, Daily PRN  acetaminophen (TYLENOL) tablet 650 mg, Q6H PRN   Or  acetaminophen (TYLENOL) suppository 650 mg, Q6H PRN  sodium chloride flush 0.9 % injection 5-40 mL, 2 times per day  influenza quadrivalent split vaccine (FLUZONE;FLUARIX;FLULAVAL;AFLURIA) injection 0.5 mL, Prior to discharge        Review of Systems:   14 point ROS obtained but were negative except mentioned in HPI      Physical exam:     Vitals:  /75   Pulse 70   Temp 98.4 °F (36.9 °C) (Oral)   Resp 15   Ht 5' 2\" (1.575 m)   Wt 213 lb 6.5 oz (96.8 kg)   LMP 10/23/2015   SpO2 97%   BMI 39.03 kg/m²   Constitutional:  OAA X3 NAD  Skin: no rash, turgor wnl  Heent:  eomi, mmm  Neck: no bruits or jvd noted  Cardiovascular:  S1, S2 without m/r/g  Respiratory: b/L base crackles   Abdomen:  +bs, soft, nt, nd  Ext: +  lower extremity edema  Psychiatric: mood and affect appropriate  Musculoskeletal:  Rom, muscular strength intact    Labs:  CBC:   Recent Labs     10/07/21  0520 10/07/21  1157 10/08/21  0454 10/08/21  0454 10/08/21  1330 10/09/21  0301 10/09/21  0510   WBC 6.2  --  9.6  --   --   --  9.1   HGB 6.9*   < > 7.5*   < > 6.8* 8.6* 8.8*     --  324  --   --   --  263    < > = values in this interval not displayed.      BMP:    Recent Labs     10/07/21  0520 10/07/21  0520 10/08/21  0454 10/08/21  0454 10/08/21  1015 10/09/21  0301 10/09/21  0510     --  139  --   --   --  135*   K 4.6   < > 5.8*   < > 5.7* 5.0 4.8     --  104  --   --   --  102   CO2 27  --  27  --   --   --  26   BUN 37*  --  37* --   --   --  44*   CREATININE 1.9*  --  2.2*  --   --   --  2.8*   GLUCOSE 208*  --  141*  --   --   --  109*    < > = values in this interval not displayed. Ca/Mg/Phos:   Recent Labs     10/07/21  0520 10/08/21  0454 10/09/21  0510   CALCIUM 8.6 8.6 8.6   MG 1.90 1.80 1.70*     Hepatic:   No results for input(s): AST, ALT, ALB, BILITOT, ALKPHOS in the last 72 hours. Troponin:   No results for input(s): TROPONINI in the last 72 hours. BNP: No results for input(s): BNP in the last 72 hours. Lipids: No results for input(s): CHOL, TRIG, HDL, LDLCALC, LABVLDL in the last 72 hours. ABGs: No results for input(s): PHART, PO2ART, KZQ1CWP in the last 72 hours. INR:   No results for input(s): INR in the last 72 hours. UA:  No results for input(s): [address], GLUCOSEU, BILIRUBINUR, KETUA, SPECGRAV, BLOODU, PHUR, PROTEINU, UROBILINOGEN, NITRU, LEUKOCYTESUR, [place] in the last 72 hours. Urine Microscopic:   No results for input(s): LABCAST, BACTERIA, COMU, HYALCAST, WBCUA, RBCUA, EPIU in the last 72 hours. Urine Culture: No results for input(s): LABURIN in the last 72 hours. Urine Chemistry:   No results for input(s): Olinda Quitter, PROTEINUR, NAUR in the last 72 hours. IMAGING:  XR FOOT RIGHT (2 VIEWS)   Final Result      Expected postsurgical changes of right fourth ray and partial cuboid resection. XR FOOT LEFT (2 VIEWS)   Final Result      Expected postsurgical changes of left fifth ray resection. VL Extremity Venous Bilateral   Final Result      IR TUNNELED CVC PLACE WO SQ PORT/PUMP > 5 YEARS   Final Result   Impression:      Successful placement of a tunneled central venous catheter via the right internal jugular vein. The catheter is ready for immediate use. VL DUP LOWER EXTREMITY ARTERIES BILATERAL   Final Result      MRI FOOT LEFT WO CONTRAST   Final Result   Osteomyelitis involving the fifth metatarsal and fifth proximal phalanx with extensive osseous destruction. Mild osteomyelitis involving the lateral aspect of the cuboid. Prior partial first digit amputation. MRI FOOT RIGHT WO CONTRAST   Final Result   Multiple prior amputations. Soft tissue ulceration lateral to the cuboid with mild acute osteomyelitis involving the lateral base of the fourth metatarsal and more distal plantar aspect of the remaining fourth metatarsal shaft as well as the lateral aspect of the cuboid. XR FOOT LEFT (MIN 3 VIEWS)   Final Result      Numerous osteotomies with soft tissue swelling and possible ulcer lateral aspect of the right midfoot. No plain radiographic evidence for osteomyelitis, however plain film findings for osteomyelitis are often late findings. 3 views left foot      FINDINGS:      There is a mottled appearance of the phalanges of the right fifth digit as well as the fifth metatarsal. The remaining metatarsals unremarkable in appearance. Patient's had prior osteotomy of the first distal phalangeal tuft. IMPRESSION:      Plain radiographic evidence for osteomyelitis involving the fifth phalanges and fifth metatarsal. Patient's bones are osteopenic. XR FOOT RIGHT (MIN 3 VIEWS)   Final Result      Numerous osteotomies with soft tissue swelling and possible ulcer lateral aspect of the right midfoot. No plain radiographic evidence for osteomyelitis, however plain film findings for osteomyelitis are often late findings. 3 views left foot      FINDINGS:      There is a mottled appearance of the phalanges of the right fifth digit as well as the fifth metatarsal. The remaining metatarsals unremarkable in appearance. Patient's had prior osteotomy of the first distal phalangeal tuft. IMPRESSION:      Plain radiographic evidence for osteomyelitis involving the fifth phalanges and fifth metatarsal. Patient's bones are osteopenic. XR CHEST PORTABLE   Final Result      Mild cardiomegaly and mild interstitial edema. Assessment/Plan :      1. Lin onc CKD 4   LIN 2/2 multiple factors   BP low   Stop cardura   Saline bolus   Stop lasix     2. HTN. BP controlled   - on hydralazine    3. Acute on chronic CHF   - stable     4. DM 2. Needs better control     5. Electrolytes. Monitor and replace.      6. Diabetic foot infection with necrotic ulcer   Renal dose abx   ID following       Recommend to dose adjust all medications  based on renal functions  Maintain SBP> 90 mmHg   Daily weights   AVOID NSAIDs  Avoid Nephrotoxins  Monitor Intake/Output  Call if significant decrease in urine output           Thank you for allowing us to participate in care of Diogo Frances         Electronically signed by: Nicanor Loya MD, 10/9/2021, 1:20 PM      [facility] [address]  Office : [phone]  Fax :[phone]

## 2021-10-10 NOTE — PROGRESS NOTES
Progress Note    Admit Date: 10/1/2021  Day: 9  Diet: ADULT DIET; Regular; 3 carb choices (45 gm/meal); Low Sodium (2 gm); Low Potassium (Less than 3000 mg/day); 2000 ml  Adult Oral Nutrition Supplement; Renal Oral Supplement    CC: B/L leg pain for 2-6 weeks    Interval history:   NAONE. No active complaints this am. CBC/Kidney func stable. Plan for return to OR for wound delayed primary closure tomorrow. HPI:   Theo Sidhu. [place] is a 59 yo F with PMH CKD4, DVT (2004; on Eqliquis), T2DM c/b b/l lower extremity ulcers (follows with Podiatry outpatient, last visit 9/27/21), HTN, narcotic-dependent chronic pain (on Methadone 140 mg/day) who presented for progressively worsening b/l lower extremity pain for the 2 weeks. Patient states she presented today with pain in legs so severe that it is now inhibiting her ability to ambulate. She endorses that the legs are also more swollen and warm to touch with pain when she touches it. She endorses she tries to eat a healthy diet low in salt, she also denies any major weight fluctuations recently.     On ROS, denies fever/chills, nausea/vomiting, diarrhea/constipation, urinary issues including dysuria, hematuria, or changes to amount or flow of urine. She denies abdominal pain.      On arrival to [facility] ED, VSS. Patient appeared extremely sleepy on physical exam. Chemistry significant for Na 133, BUN 42/Cr 2.5. Pro-BNP elevated 4,733 (baseline 2-3k). Trops 0.04. CBC with Hgb 9.1 Hct 28.5 at baseline. CXR with mild cardiomegaly and interstitial edema.  She was given IV Lasix 40 in the ED and admitted to the floor.        Medications:     Scheduled Meds:   heparin (porcine)  5,000 Units SubCUTAneous 3 times per day    vancomycin  750 mg IntraVENous Q24H    metoprolol succinate  50 mg Oral Daily    hydrALAZINE  50 mg Oral 3 times per day    lidocaine 1 % injection  5 mL IntraDERmal Once    sodium chloride flush  5-40 mL IntraVENous 2 times per day    hydrALAZINE  10 mg IntraVENous Once    meropenem  1,000 mg IntraVENous Q12H    insulin glargine  12 Units SubCUTAneous Nightly    insulin lispro  0-18 Units SubCUTAneous TID WC    insulin lispro  0-9 Units SubCUTAneous Nightly    fluconazole  200 mg IntraVENous Q24H    amitriptyline  100 mg Oral Nightly    aspirin EC  81 mg Oral Daily    atorvastatin  20 mg Oral Nightly    escitalopram  10 mg Oral Daily    gabapentin  400 mg Oral BID    methadone  140 mg Oral Daily    pantoprazole  40 mg Oral Daily    sodium chloride flush  5-40 mL IntraVENous 2 times per day    influenza virus vaccine  0.5 mL IntraMUSCular Prior to discharge     Continuous Infusions:   sodium chloride      sodium chloride 25 mL (10/10/21 0525)    sodium chloride      dextrose      sodium chloride 25 mL (10/09/21 1752)     PRN Meds:hydrALAZINE, sodium chloride, sodium chloride flush, sodium chloride, labetalol, sodium chloride, glucose, dextrose, glucagon (rDNA), dextrose, albuterol, sodium chloride flush, sodium chloride, ondansetron **OR** ondansetron, polyethylene glycol, acetaminophen **OR** acetaminophen    Objective:   Vitals:   T-max:  Patient Vitals for the past 8 hrs:   BP Temp Temp src Pulse Resp SpO2   10/10/21 1149 (!) 165/75 97.7 °F (36.5 °C) Oral 71 18 97 %   10/10/21 0700 (!) 175/75 98.4 °F (36.9 °C) Oral 69 16 96 %       Intake/Output Summary (Last 24 hours) at 10/10/2021 1349  Last data filed at 10/10/2021 1153  Gross per 24 hour   Intake 1320 ml   Output 1400 ml   Net -80 ml       Physical Exam  Physical Exam  Constitutional:       General: She is not in acute distress. HENT:      Head: Normocephalic and atraumatic. Nose: No congestion or rhinorrhea. Mouth/Throat:      Mouth: Mucous membranes are moist.      Pharynx: No oropharyngeal exudate or posterior oropharyngeal erythema. Eyes:      General: No scleral icterus.      Conjunctiva/sclera: Conjunctivae normal.   Cardiovascular:      Rate and Rhythm: Normal rate and regular rhythm. Heart sounds: No murmur heard. No gallop. Pulmonary:      Effort: No respiratory distress. Breath sounds: No wheezing or rales. Abdominal:      General: There is no distension. Palpations: Abdomen is soft. Tenderness: There is no abdominal tenderness. There is no guarding. Musculoskeletal:      Cervical back: Neck supple. No tenderness. Right lower leg: Edema present. Left lower leg: Edema present. Comments: Chronic venous changes on b/l LE, mild tenderness, significant swelling. B/L feet with wounds, not purulent    Skin:     General: Skin is dry. Capillary Refill: Capillary refill takes less than 2 seconds. Neurological:      General: No focal deficit present. Mental Status: She is alert and oriented to person, place, and time. LABS:    CBC:   Recent Labs     10/08/21  0454 10/08/21  1330 10/09/21  0510 10/09/21  1645 10/10/21  0534   WBC 9.6  --  9.1  --  7.9   HGB 7.5*   < > 8.8* 8.8* 8.9*   HCT 22.6*   < > 26.8* 27.2* 27.3*     --  263  --  301   MCV 83.4  --  85.1  --  85.5    < > = values in this interval not displayed. Renal:    Recent Labs     10/08/21  0454 10/08/21  1015 10/09/21  0301 10/09/21  0510 10/10/21  0534     --   --  135* 137   K 5.8*   < > 5.0 4.8 5.5*     --   --  102 105   CO2 27  --   --  26 25   BUN 37*  --   --  44* 46*   CREATININE 2.2*  --   --  2.8* 2.4*   GLUCOSE 141*  --   --  109* 168*   CALCIUM 8.6  --   --  8.6 8.5   MG 1.80  --   --  1.70* 2.20   ANIONGAP 8  --   --  7 7    < > = values in this interval not displayed. Hepatic:   No results for input(s): AST, ALT, BILITOT, BILIDIR, PROT, LABALBU, ALKPHOS in the last 72 hours. Troponin:   No results for input(s): TROPONINI in the last 72 hours. BNP: No results for input(s): BNP in the last 72 hours. Lipids: No results for input(s): CHOL, HDL in the last 72 hours.     Invalid input(s): LDLCALCU, TRIGLYCERIDE  ABGs:  No results for input(s): PHART, PCP8SJO, PO2ART, PLP0BUP, BEART, THGBART, W5OHCICW, BXJ9RRD in the last 72 hours. INR:   No results for input(s): INR in the last 72 hours. Lactate: No results for input(s): LACTATE in the last 72 hours. Cultures:  -----------------------------------------------------------------  RAD:   XR FOOT RIGHT (2 VIEWS)   Final Result      Expected postsurgical changes of right fourth ray and partial cuboid resection. XR FOOT LEFT (2 VIEWS)   Final Result      Expected postsurgical changes of left fifth ray resection. VL Extremity Venous Bilateral   Final Result      IR TUNNELED CVC PLACE WO SQ PORT/PUMP > 5 YEARS   Final Result   Impression:      Successful placement of a tunneled central venous catheter via the right internal jugular vein. The catheter is ready for immediate use. VL DUP LOWER EXTREMITY ARTERIES BILATERAL   Final Result      MRI FOOT LEFT WO CONTRAST   Final Result   Osteomyelitis involving the fifth metatarsal and fifth proximal phalanx with extensive osseous destruction. Mild osteomyelitis involving the lateral aspect of the cuboid. Prior partial first digit amputation. MRI FOOT RIGHT WO CONTRAST   Final Result   Multiple prior amputations. Soft tissue ulceration lateral to the cuboid with mild acute osteomyelitis involving the lateral base of the fourth metatarsal and more distal plantar aspect of the remaining fourth metatarsal shaft as well as the lateral aspect of the cuboid. XR FOOT LEFT (MIN 3 VIEWS)   Final Result      Numerous osteotomies with soft tissue swelling and possible ulcer lateral aspect of the right midfoot. No plain radiographic evidence for osteomyelitis, however plain film findings for osteomyelitis are often late findings.             3 views left foot      FINDINGS:      There is a mottled appearance of the phalanges of the right fifth digit as well as the fifth metatarsal. The remaining metatarsals unremarkable in appearance. Patient's had prior osteotomy of the first distal phalangeal tuft. IMPRESSION:      Plain radiographic evidence for osteomyelitis involving the fifth phalanges and fifth metatarsal. Patient's bones are osteopenic. XR FOOT RIGHT (MIN 3 VIEWS)   Final Result      Numerous osteotomies with soft tissue swelling and possible ulcer lateral aspect of the right midfoot. No plain radiographic evidence for osteomyelitis, however plain film findings for osteomyelitis are often late findings. 3 views left foot      FINDINGS:      There is a mottled appearance of the phalanges of the right fifth digit as well as the fifth metatarsal. The remaining metatarsals unremarkable in appearance. Patient's had prior osteotomy of the first distal phalangeal tuft. IMPRESSION:      Plain radiographic evidence for osteomyelitis involving the fifth phalanges and fifth metatarsal. Patient's bones are osteopenic. XR CHEST PORTABLE   Final Result      Mild cardiomegaly and mild interstitial edema. Assessment/Plan:      Leslie Byrne. Pham Villanueva is a 59 yo F with PMH CKD4, DVT (on Eqliquis), T2DM c/b b/l lower extremity ulcers (follows with Podiatry outpatient, last visit 9/27/21), HTN, history of pain medication addiction (on Methadone 140 mg/day) who presented for progressively worsening b/l lower extremity pain for the 2 weeks. Osteomyelitis of b/l feet  Recently debrided outpatient on 9/27/21, do not appear acutely infected. Patient on Clinda and Cipro. XR of both feet show osteomyelitis involving 5th phalanges and fifth metatarsal.   - Podiatry following  - wound care per Podiatry  - Wound cultures grew pseudomonas, E coli and enterococcus  - Antibiotics per ID, vanc, merem, fluconazole  - return to OR for wound delayed primary closure tomorrow.  -Patient is Medically clear to go to OR    FAHEEM on CKD 4 (improving)  Baseline creat is appears around 2. Hypoalbuminemia, Albumin 2.6 on presentation. Suspect from diabetic nephropathy in setting of poorly controlled diabetes. HbA1c 10/2021 at 8.3. Low BP may also be playing a part. - Kidney func stable  - Avoid nephrotoxic agents  - Nephrology following     Acute blood loss anemia requiring transfusions  Likely 2/2 blood loss in OR in the setting of AOCD from CKD. Iron 16, iron sat 9, transferrin 7.8, TIBC 174. Requiring 4 uRBC transfusion so far. - H/H at 8 pm, goal > 7    Chronic diastolic Heart Failure  Last Echo 2018 with EF 50-55%. Evidence of PAH which may be contributing. CXR 10/1 with mild cardiomegaly and interstitial edema. Suspect rt heart failure from undiagnosed sleep apnea. Echo with EF 55%, LVH and estimated pulm artery systolic pressure is at 60 mmHg   - Lasix 20 mg IV daily held for worsening FAHEEM  - strict I/O's, daily weights   - Low salt diet    Hyperkalemia   -Suspect 2/2 to blood transfuions  - EKG without changes  - Continue to monitor daily RFP    Hypertension  - Hold parameters placed for low BP   - Hydralazine 50 mg TID  - Metoprolol 50 mg QD  - Hold cardura for now unless directed otherwise by nephro     Hyponatremia (resolved)  Na 133 on admission.  - continue to monitor      T2DM   BS 91 - recent outpatient visit records show good blood sugar control. HbA1c 6.8 on 06/17/21. - HbA1c on  10/2021 at 8.3  - on wt loss, exercise, good diet and medication compliance  -lantus 12 units plus HDSS, hypoglycemia protocol     Hx of Opioid Pain Medication Use, Now on Methadone   - Continue Methadone 140 mg daily         Code Status: Full Code   FEN: ADULT DIET; Regular; 3 carb choices (45 gm/meal); Low Sodium (2 gm); Low Potassium (Less than 3000 mg/day); 2000 ml  Adult Oral Nutrition Supplement;  Renal Oral Supplement   PPX: heparin TID  DISPO: Felicia Em MD, PGY-2  10/10/21  1:49 PM    This patient has been staffed and discussed with Gerardo Plunkett MD.

## 2021-10-10 NOTE — PLAN OF CARE
Problem: Falls - Risk of:  Goal: Will remain free from falls  Description: Will remain free from falls  10/10/2021 1155 by Carmella Royal RN  Outcome: Ongoing  Note: Patient is a fall risk. See Fall Risk assessment for details. Bed is in low, lock position; call light/belongings within reach. No attempts to get out of bed have been made, calls appropriately when assistance is needed. Bed alarm and hourly rounds in place for safety. Problem: Skin Integrity:  Goal: Will show no infection signs and symptoms  Description: Will show no infection signs and symptoms  10/10/2021 1155 by Carmella Royal RN  Outcome: Ongoing  Note: Pt has scattered bruising on bue, legs wrapped per podiatry this am. Ble ace wrapped to knee.  Pt turning and repositioning herself in bed

## 2021-10-10 NOTE — PLAN OF CARE
Problem: Falls - Risk of:  Goal: Will remain free from falls  Description: Will remain free from falls  Outcome: Met This Shift  Note: Pt remained free from falls during shift. Pt's bed is locked in lowest position with alarm on, call light, bedside table, and personal belongings within reach. Problem: Falls - Risk of:  Goal: Absence of physical injury  Description: Absence of physical injury  Outcome: Met This Shift  Note: Pt remained free from physical injury during shift. Problem: Pain:  Goal: Pain level will decrease  Description: Pain level will decrease  Outcome: Met This Shift  Note: Pt did not complain of any pain during shift. Problem: Pain:  Goal: Control of acute pain  Description: Control of acute pain  Outcome: Met This Shift  Note: Pt did not complain of any pain during shift. Problem: Pain:  Goal: Control of acute pain  Description: Control of acute pain  Outcome: Met This Shift  Note: Pt did not complain of any pain during shift. Problem: Pain:  Goal: Control of chronic pain  Description: Control of chronic pain  Outcome: Met This Shift  Note: Pt did not complain of any pain during shift. Problem: Skin Integrity:  Goal: Absence of new skin breakdown  Description: Absence of new skin breakdown  Outcome: Met This Shift  Note: Pt remained free from new skin breakdown during shift. Problem: OXYGENATION/RESPIRATORY FUNCTION  Goal: Patient will maintain patent airway  Outcome: Met This Shift  Note: Pt maintained a patent airway during shift. Problem: OXYGENATION/RESPIRATORY FUNCTION  Goal: Patient will achieve/maintain normal respiratory rate/effort  Description: Respiratory rate and effort will be within normal limits for the patient  Outcome: Met This Shift  Note: Pt's respiratory rate normal during shift. Pt on 2L/min oxygen NC at start of shift and weaned to 1L/min.       Problem: Skin Integrity:  Goal: Will show no infection signs and symptoms  Description: Will show no infection signs and symptoms  Outcome: Ongoing  Note: Pt is receiving IV antibiotics. Pt's vital signs stable, afebrile, WBC normal. Will continue to closely monitor.

## 2021-10-10 NOTE — PROGRESS NOTES
Podiatric Surgery Daily Progress Note  Taiwo Haynes      Subjective :   Patient seen and examined this am at the bedside. Patient denies any acute overnight events. Patient denies N/V/F/C/SOB. Patient denies calf pain, thigh pain, chest pain. Review of Systems: A 12 point review of symptoms is unremarkable with the exception of the chief complaint. Patient specifically denies nausea, fever, vomiting, chills, shortness of breath, chest pain, abdominal pain, constipation or difficulty urinating. Objective     BP (!) 175/75   Pulse 69   Temp 98.4 °F (36.9 °C) (Oral)   Resp 16   Ht 5' 2\" (1.575 m)   Wt 212 lb 15.4 oz (96.6 kg)   LMP 10/23/2015   SpO2 96%   BMI 38.95 kg/m²      I/O:    Intake/Output Summary (Last 24 hours) at 10/10/2021 1042  Last data filed at 10/10/2021 0514  Gross per 24 hour   Intake 840 ml   Output 1400 ml   Net -560 ml              Wt Readings from Last 3 Encounters:   10/10/21 212 lb 15.4 oz (96.6 kg)   08/12/21 200 lb (90.7 kg)   06/18/21 218 lb 0.6 oz (98.9 kg)       LABS:    Recent Labs     10/09/21  0510 10/09/21  0510 10/09/21  1645 10/10/21  0534   WBC 9.1  --   --  7.9   HGB 8.8*   < > 8.8* 8.9*   HCT 26.8*   < > 27.2* 27.3*     --   --  301    < > = values in this interval not displayed. Recent Labs     10/10/21  0534      K 5.5*      CO2 25   BUN 46*   CREATININE 2.4*        No results for input(s): PROT, INR, APTT in the last 72 hours. LOWER EXTREMITY EXAMINATION    Dressing to bilateral LE intact. No strikethrough noted to the external dressing. Moderate sanguinous drainage noted to the internal layers of the dressing. VASCULAR: DP and PT pulses nonpalpable bilateral.  Upon hand-held Doppler examination, DP and PT signals weakly biphasic bilateral. CFT is brisk to the distal aspect of the foot bilateral. Skin temperature is warm to cool from proximal to distal with no focal calor noted.   +2 pitting edema noted bilateral.  No pain with calf compression b/l. NEUROLOGIC: Gross and epicritic sensation is diminished b/l. Protective sensation is diminished at all pedal sites b/l. DERMATOLOGIC:   Right lower extremity:  Full-thickness ulceration noted to the plantar aspect beginning distally at the level of the previous fourth metatarsal head and extending proximally to the cuboid with cuboid bone exposed. Retention sutures intact. Surgifoam left intact to proximal aspect of wound bed. Wound base granular. No fluctuance, crepitus, active drainage, erythema, or malodor noted. No dehiscence noted to wound edges. Left lower extremity:  Full-thickness ulceration noted plantar lateral aspect beginning at the base of the fourth digit and extending proximally towards the base of the fourth metatarsal and laterally towards the third metatarsal with fourth metatarsal base and cuboid bone exposed. Retention sutures intact. Surgifoam left intact to wound bed. Wound base granular. No fluctuance, crepitus, active drainage, erythema, or malodor noted. No dehiscence noted to wound edges. MUSCULOSKELETAL: Muscle strength is 4/5 for all pedal groups tested. No pain with palpation of the foot or ankle b/l. Ankle joint ROM is decreased in dorsiflexion with the knee extended. 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 S/p left fifth ray resection and right fourth ray and partial cuboid resection. IMAGING:  X-ray right foot 10/7/2021-postop  Narrative   EXAM: XR FOOT RIGHT (2 VIEWS)       INDICATION:  s/p 4th ray resection and partial cuboid resection; packed open       COMPARISON: 10/3/2021, 10/2/2021       FINDINGS:       There are postsurgical changes of fourth ray resection and partial cuboid resection. Postsurgical changes of prior fifth ray resection and first, second, and third toe resections again seen. Impression       Expected postsurgical changes of right fourth ray and partial cuboid resection. X-ray left foot 10/7/2021-postop  Narrative   EXAM: XR FOOT LEFT (2 VIEWS)       INDICATION:  s/p 5th ray resection & cuboid bone biopsy; packed open       COMPARISON: 10/3/2021, 10/2/2021       FINDINGS:       There are postsurgical changes of fifth ray resection. There is a small amount of postsurgical subcutaneous gas. Redemonstration of prior amputation of the first digit at the level of the base of the first proximal phalanx again seen. Impression       Expected postsurgical changes of left fifth ray resection. MRI right foot 10/3/2021  Narrative   EXAMINATION: Magnetic resonance imaging (MRI) of the right foot without contrast       HISTORY: Osteomyelitis 5th metatarsal base; rule out OM cuboid       TECHNIQUE: MRI of the right foot was performed using multiple pulse sequences in multiple planes without contrast.       COMPARISON: Radiograph dated 10/2/2021       FINDINGS:        There have been prior first second third MTP joint, fourth transmetatarsal, and fifth TMT joint amputations. There is a soft tissue ulcer lateral to the base of the fourth metatarsal with adjacent T1 hypointensity in marrow edema involving the base of    the fourth metatarsal as well as the lateral aspect of the cuboid representing osteomyelitis. There is also soft tissue swelling more distally along the plantar aspect of the remaining fourth metatarsal shafts with osteomyelitis along the plantar cortex    of the remaining fourth metatarsal. There is no acute fracture or dislocation. There is a degenerative subchondral cyst in the navicular. No abscess is seen within limits of noncontrast examination. Visualized tendons and plantar fascia are unremarkable. Impression   Multiple prior amputations.    Soft tissue ulceration lateral to the cuboid with mild acute osteomyelitis involving the lateral base of the fourth metatarsal and more distal plantar aspect of the remaining fourth metatarsal shaft as well as the lateral aspect of the cuboid. MRI left foot 10/3/2021  Narrative   EXAMINATION: Magnetic resonance imaging (MRI) of the left foot without contrast       HISTORY: Osteomyelitis 5th metatarsal       TECHNIQUE: MRI of the left foot was performed using multiple pulse sequences in multiple planes without contrast.       COMPARISON: Radiograph dated 10/2/2021       FINDINGS:        There has been prior amputation of the first digit at the level of the base of the first proximal phalanx. There is soft tissue ulceration lateral to the fifth mid metatarsal with surrounding edema and phlegmon in the subcutaneous tissues. There is    extensive destruction of the mid to distal aspect of the fifth metatarsal and base of the fifth proximal phalanx representing sequelae of osteomyelitis. There is also mild osteomyelitis extending to the base of the fifth metatarsal as well as along the    lateral aspect of the cuboid. Impression   Osteomyelitis involving the fifth metatarsal and fifth proximal phalanx with extensive osseous destruction. Mild osteomyelitis involving the lateral aspect of the cuboid. Prior partial first digit amputation. Lower extremity arterial duplex 10/4/2021-preliminary  Right   Right CRISTIAN was not available due to patient non-compliance . There are multiphasic waveforms in the common femoral artery indicating no   aortoiliac inflow disease. There is atherosclerotic plaque involving the superficial femoral and   popliteal arteries with no significantly elevated velocities. Left   Left CRISTIAN was not available due to patient non-compliance . There are multiphasic waveforms in the common femoral artery indicating no   aortoiliac inflow disease. There is atherosclerotic plaque involving the superficial femoral and   popliteal arteries with no significantly elevated velocities. The peroneal artery is not visualized. There is no previous exam for comparison. ASSESSMENT/PLAN  -S/p bilateral lower extremity I&D with left fifth ray resection, right fourth ray resection, right partial cuboid resection, 10/7/2021  -Diabetic foot ulceration with osteomyelitis, Carpenter 3, right lower extremity  -Diabetic foot ulceration with osteomyelitis, Carpenter 3, left lower extremity  -PVD, bilateral lower extremity  -Edema, bilateral lower extremity  -Diabetes mellitus type 2 with peripheral neuropathy  -History of noncompliance with weightbearing status    -Patient examined and evaluated at the bedside   -Hypertensive otherwise VSS. No leukocytosis noted. -ESR >120, .1  -Imaging reviewed, noted above  -Bone biopsy taken right foot, sent for pathology  -Bone biopsy taken of the left cuboid, sent for pathology  -Wound culture right foot: Pseudomonas aeruginosa, E coli, Enterococcus faecalis (10/2)  -Dressing applied to bilateral lower extremity consisting of gauze, ABD, DSD, Ace bandage  -Continue antibiotics per ID recommendations.  -Non-weightbearing bilateral lower extremity  -Lengthy discussion with patient about intraoperative findings of right cuboid bone being soft and likelihood of it being infected. Discussed with patient that cuboid may not be salvageable at this time. Discussed with patient that removal of cuboid will leave her with a very unstable foot. Patient understands but insists that she does not want a more proximal amputation at this time. Discussed options pending bone biopsy of cuboid including further surgical resection versus attempted IV antibiotics. Discussed with patient that IV antibiotics may not be enough to treat the infected bone at this time. Patient understands but insists that she does not want a more proximal amputation. DISPO: S/p bilateral lower extremity I&D with fifth ray resection left foot, fourth ray resection right foot, partial cuboid resection right foot.   Patient will be brought back to the OR for delayed primary closure this hospital admission pending biopsy results and surgical clearance per medicine team.  Continue antibiotics per ID recommendations. Will follow up on bone biopsies for bilateral lower extremity.     Discussed assessment and plan with Dr. Radha Moon DPM.    Ruben Pleitez DPM  10/10/21  10:42 AM

## 2021-10-10 NOTE — PROGRESS NOTES
Office : [phone]     Fax :[phone]       Nephrology progress  Note      Patient's Name: Remberto Burleson    Reason for Consult:  FAHEEM on CKD 4       Requesting Physician:  Wilfredo Echeverria MD      Chief Complaint:    Chief Complaint   Patient presents with    Leg Pain     x 2 weeks. History of Present iIlness:    Remberto Burleson is a 60 y.o. female with past medical h/o CKD4, DVT, T2DM c/b b/l lower extremity ulcers  who presented for progressively worsening b/l lower extremity pain for the 2 weeks. Patient states she presented today with pain in legs so severe that it is now inhibiting her ability to ambulate. She endorses that the legs are also more swollen and warm to touch with pain when she touches it. She endorses she tries to eat a healthy diet low in salt, she also denies any major weight fluctuations recently.     Baseline creat is 2/0   Now elevated at 2.4        Interval hx :    Cr better   BP better now   No N/V/D     K elevated         I/O last 3 completed shifts:   In: 18 [P.O.:1000; IV Piggyback:52]  Out: 65 [Urine:1700]    Past Medical History:   Diagnosis Date    Asthma 05/14/2004    Bacterial vaginosis 04/2008    Carpal tunnel syndrome 05/2007    COPD (chronic obstructive pulmonary disease) ([address]     Diabetes mellitus type II 08/2007    10/1/20 pt states does accucheck 2x/day at home    Diabetic neuropathy ([address] 51/1076    Diastolic CHF ([address]     DVT (deep venous thrombosis) ([address] 03/2004    Dyslipidemia 05/2009    Dyspareunia 05/2009    ETOH abuse 03/04/2007    Feet clawing     HTN (hypertension)     Hx of blood clots     Hyperlipidemia     MRSA (methicillin resistant staph aureus) culture positive 11/06/2017; 11/17/2017    foot; leg     Neuropathy 2009    polyneuropathy    Pancreatitis 2004    Scalp lesion 2007    Tobacco abuse 2008    Uses walker     Uses wheelchair     also uses walker    Wears dentures        Past Surgical History:   Procedure Laterality Date     SECTION  unknown    FOOT DEBRIDEMENT Right 2019    INCISION AND DRAINAGE WITH APPLICATION OF STRAVIX GRAFT RIGHT FOOT performed by Juliann Gibbons DPM at [address] Right 2019    RIGHT FOOT INCISION AND DRAINAGE WITH STAGING TRANSMETATARSAL AMPUTATION performed by Juliann Gibbons DPM at [address] Right 2019    RIGHT FOOT DEBRIDEMENT INCISION AND DRAINAGE, OPEN DIABETIC FOOT ULCER WITH GRAFT PLACEMENT performed by Juliann Gibbons DPM at [facility] Left 10/23/2019    LEFT FOOT INCISION AND DRAINAGE , DEBRIDEMENT OF OPEN WOUND, APPLICATION OF STRAVIX GRAFT performed by Juliann Gibbons DPM at [address] Right 3/29/2021    INCISION AND DRAINAGE, DEBRIDEMENT OF DIABETIC WOUND WITH PLACEMENT OF STRAVIX GRAFT RIGHT FOOT performed by Juliann Gibbons DPM at [address]  10/7/2021    BILATERAL LOWER EXTREMITY INCISION AND DRAINAGE, RIGHT FOOT 4TH RAY RESECTION, LEFT FOOT 5TH RAY RESECTION performed by Juliann Gibbons DPM at [address] [facility] IR TUNNELED CATHETER PLACEMENT GREATER THAN 5 YEARS  10/5/2021    IR TUNNELED CATHETER PLACEMENT GREATER THAN 5 YEARS 10/5/2021 [facility] SPECIAL PROCEDURES    KNEE SURGERY Left     from falling off ladder -- has screws in place pt report    OTHER SURGICAL HISTORY Left 2016    I & D left foot    OTHER SURGICAL HISTORY Right 10/20/2017    RIGHT GASTROC LENGTHENING ENDOSCOPIC, INJECTION OF AMNI GRAFT    OTHER SURGICAL HISTORY Right 2018    Diabetic foot ulcer I&D w/ integra graft application    AK DEBRIDEMENT, SKIN, SUB-Q TISSUE,MUSCLE,BONE,=<20 SQ CM Right 2018    RIGHT FOOT DEBRIDEMENT INCISION AND DRAINAGE, PARTIAL 5TH RAY AMPUTATION performed by Jason Bronson DPM at [address] [place] / [address]  7/7003    cryotherapy done on lesion    TOE AMPUTATION Left 02/24/2017    AMPUTATION LEFT GREAT TOE                 TONSILLECTOMY         History reviewed. No pertinent family history. reports that she quit smoking about 3 years ago. Her smoking use included cigarettes. She has a 30.00 pack-year smoking history. She has never used smokeless tobacco. She reports that she does not drink alcohol and does not use drugs.         Allergies:  Sulfa antibiotics    Current Medications:    sodium zirconium cyclosilicate (LOKELMA) oral suspension 10 g, Once  vancomycin (VANCOCIN) intermittent dosing (placeholder), See Admin Instructions  [Held by provider] heparin (porcine) injection 5,000 Units, 3 times per day  metoprolol succinate (TOPROL XL) extended release tablet 50 mg, Daily  hydrALAZINE (APRESOLINE) tablet 50 mg, 3 times per day  hydrALAZINE (APRESOLINE) injection 5 mg, Q6H PRN  0.9 % sodium chloride infusion, PRN  lidocaine PF 1 % injection 5 mL, Once  sodium chloride flush 0.9 % injection 5-40 mL, 2 times per day  sodium chloride flush 0.9 % injection 5-40 mL, PRN  0.9 % sodium chloride infusion, PRN  labetalol (NORMODYNE;TRANDATE) injection syringe 10 mg, Q4H PRN  hydrALAZINE (APRESOLINE) injection 10 mg, Once  0.9 % sodium chloride infusion, PRN  meropenem (MERREM) 1,000 mg in sodium chloride 0.9 % 100 mL IVPB (mini-bag), Q12H  glucose (GLUTOSE) 40 % oral gel 15 g, PRN  dextrose 50 % IV solution, PRN  glucagon (rDNA) injection 1 mg, PRN  dextrose 5 % solution, PRN  insulin glargine (LANTUS;BASAGLAR) injection pen 12 Units, Nightly  insulin lispro (1 Unit Dial) 0-18 Units, TID WC  insulin lispro (1 Unit Dial) 0-9 Units, Nightly  fluconazole (DIFLUCAN) 200 mg IVPB, Q24H  albuterol (PROVENTIL) nebulizer solution 2.5 mg, Q6H PRN  amitriptyline (ELAVIL) tablet 100 mg, Nightly  aspirin EC tablet 81 mg, > = values in this interval not displayed. Ca/Mg/Phos:   Recent Labs     10/08/21  0454 10/09/21  0510 10/10/21  0534   CALCIUM 8.6 8.6 8.5   MG 1.80 1.70* 2.20     Hepatic:   No results for input(s): AST, ALT, ALB, BILITOT, ALKPHOS in the last 72 hours. Troponin:   No results for input(s): TROPONINI in the last 72 hours. BNP: No results for input(s): BNP in the last 72 hours. Lipids: No results for input(s): CHOL, TRIG, HDL, LDLCALC, LABVLDL in the last 72 hours. ABGs: No results for input(s): PHART, PO2ART, JEX0GXS in the last 72 hours. INR:   No results for input(s): INR in the last 72 hours. UA:  No results for input(s): Jane Solum, GLUCOSEU, BILIRUBINUR, KETUA, SPECGRAV, BLOODU, PHUR, PROTEINU, UROBILINOGEN, NITRU, LEUKOCYTESUR, Lorrane Leburn in the last 72 hours. Urine Microscopic:   No results for input(s): LABCAST, BACTERIA, COMU, HYALCAST, WBCUA, RBCUA, EPIU in the last 72 hours. Urine Culture: No results for input(s): LABURIN in the last 72 hours. Urine Chemistry:   No results for input(s): Velna Overlie, PROTEINUR, NAUR in the last 72 hours. IMAGING:  XR FOOT RIGHT (2 VIEWS)   Final Result      Expected postsurgical changes of right fourth ray and partial cuboid resection. XR FOOT LEFT (2 VIEWS)   Final Result      Expected postsurgical changes of left fifth ray resection. VL Extremity Venous Bilateral   Final Result      IR TUNNELED CVC PLACE WO SQ PORT/PUMP > 5 YEARS   Final Result   Impression:      Successful placement of a tunneled central venous catheter via the right internal jugular vein. The catheter is ready for immediate use. VL DUP LOWER EXTREMITY ARTERIES BILATERAL   Final Result      MRI FOOT LEFT WO CONTRAST   Final Result   Osteomyelitis involving the fifth metatarsal and fifth proximal phalanx with extensive osseous destruction. Mild osteomyelitis involving the lateral aspect of the cuboid. Prior partial first digit amputation.       MRI FOOT RIGHT WO CONTRAST   Final Result   Multiple prior amputations. Soft tissue ulceration lateral to the cuboid with mild acute osteomyelitis involving the lateral base of the fourth metatarsal and more distal plantar aspect of the remaining fourth metatarsal shaft as well as the lateral aspect of the cuboid. XR FOOT LEFT (MIN 3 VIEWS)   Final Result      Numerous osteotomies with soft tissue swelling and possible ulcer lateral aspect of the right midfoot. No plain radiographic evidence for osteomyelitis, however plain film findings for osteomyelitis are often late findings. 3 views left foot      FINDINGS:      There is a mottled appearance of the phalanges of the right fifth digit as well as the fifth metatarsal. The remaining metatarsals unremarkable in appearance. Patient's had prior osteotomy of the first distal phalangeal tuft. IMPRESSION:      Plain radiographic evidence for osteomyelitis involving the fifth phalanges and fifth metatarsal. Patient's bones are osteopenic. XR FOOT RIGHT (MIN 3 VIEWS)   Final Result      Numerous osteotomies with soft tissue swelling and possible ulcer lateral aspect of the right midfoot. No plain radiographic evidence for osteomyelitis, however plain film findings for osteomyelitis are often late findings. 3 views left foot      FINDINGS:      There is a mottled appearance of the phalanges of the right fifth digit as well as the fifth metatarsal. The remaining metatarsals unremarkable in appearance. Patient's had prior osteotomy of the first distal phalangeal tuft. IMPRESSION:      Plain radiographic evidence for osteomyelitis involving the fifth phalanges and fifth metatarsal. Patient's bones are osteopenic. XR CHEST PORTABLE   Final Result      Mild cardiomegaly and mild interstitial edema. Assessment/Plan :      1. Lin onc CKD 4   LIN 2/2 multiple factors   BP was low better now   Stop cardura   Stop lasix     2. HTN. BP controlled   - on hydralazine    3. Acute on chronic CHF   - stable     4. DM 2. Needs better control     5. Electrolytes. Monitor and replace.      6. Diabetic foot infection with necrotic ulcer   Renal dose abx   ID following       Recommend to dose adjust all medications  based on renal functions  Maintain SBP> 90 mmHg   Daily weights   AVOID NSAIDs  Avoid Nephrotoxins  Monitor Intake/Output  Call if significant decrease in urine output           Thank you for allowing us to participate in care of Yoli Avilez         Electronically signed by: Ashley Ziegler MD, 10/10/2021, 7:22 AM      [facility]  Office : [phone]  Fax :[phone]

## 2021-10-10 NOTE — PROGRESS NOTES
Clinical Pharmacy Progress Note    Vancomycin - Management by Pharmacy    Consult Date(s): 10/2/21  Consulting Provider(s): Dr. Pino Chaudhry / Plan    1) Osteomyelitis of B/L feet - Vancomycin   Concurrent Antimicrobials:   o Meropenem - day #9  o Fluconazole - day #9   Day of Vanc Therapy: day #9   Current Dosing Method: Intermittent    Therapeutic Goal: 15-20 mcg/mL   Current Dose / Frequency / Plan / Rationale:   o FAHEEM on CKD. Scr fluctuating. Yesterday 2.8, which seemed a significant increase, but back to 2.4 today.    o Yesterday, dosing was changed back to intermittent, and a smaller dose of 500mg IV x1 given.    o Will cancel random level for today, as it is not drawn. Will resume scheduled 750mg IV q24h this afternoon. Bayesian kinetics today calculate an AUC of 579 mg/L*h with a trough of  19.5 mcg/mL. o Will order random level 10/11 AM to check kinetics.  Will continue to monitor renal function closely alongside clinical condition and make adjustments to regimen as appropriate. Thank you for allowing us to participate in the care of this patient. Please reach out with any questions. Ac Smith PharmD., BCPS   10/10/2021 12:40 PM  Wireless: [phone]        Interval Update:  Remains afebrile. Scr 2.4 from 2.8. Patient receiving fluid bolus now. Per Podiatry, pt will need return to OR for delayed primary closure this admission. Subjective/Objective: Ms. Lizzy Donaldson is a 60 y.o. female with a PMHx significant for CKD4, DVT (2004) on apixaban, DM2, chronic B/L LE ulcers, HTN, and narcotic-dependent chronic pain on methadone admitted for B/L LE osteomyelitis. Pt underwent BLE I&D, 5th ray resection L foot, and partial cuboid resection bilateral feet yesterday. Pharmacy has been consulted to dose vancomycin.     Height:   Ht Readings from Last 1 Encounters:   10/01/21 5' 2\" (1.575 m)     Weight:   Wt Readings from Last 1 Encounters:   10/10/21 212 lb 15.4 oz (96.6 kg)     Level(s) / Doses:    Date Time Dose Level / Type of Level Interpretation   10/2 1401 2250 mg IV x1     10/3 0712   Random = 24.5 mcg/mL Drawn ~19 hrs after previous dose given. Hold dose. 10/4 0829  Random =18.1 mcg/mL  Re-dose 500 mg IV x1    1231 500 mg IV x1     10/5 0307  Random = 18.8 mcg/mL Drawn 14.5 hrs after dose    1231 500 mg IV x1  Re-dose with 500 mg IV x 1   10/6 0849  Random = 18.6 mcg/mL Drawn 18h after dose. Will re-dose 500 mg IV x1    1511 500 mg IV x 1     10/7 0520  Random = 15.6 mcg/mL Switch to AUC-based dosing. Schedule vancomycin 750 mg IV Q24 hours and re-assess random 10/8.    1705 750 mg IV x 1     10/8 0454  Random = 18.6 mcg/mL Continue current regimen of 750 mg IV Q24h. Predicted to achieve AUC of 538 mg/L*hr.   10/8 15:25 750mg IV x1     10/9 14:18  Random = 18.6 mcg/mL · Drawn ~24 hrs after previous dose. 10/9 16:40 500mg IV x1              Cultures & Sensitivities:    Date Site Micro Susceptibility / Result   10/2 Foot wound Pseudomonas aeruginosa     S: cefepime, gent, meropenem, Zosyn, tobramycin  R: Cipro     Foot wound E. coli  S: Cefepime, ceftriaxone, cefuroxime, ertapenem, gent, meropenem, Zosyn, Bactrim  R: Cipro    Foot wound  Enterococcus faecalis S: Ampicillin, Vancomycin      Foot wound Providencia stuartii    10/7 Tissue foot Pseudomonas aeruginosa S: cefepime, gent, tobramycin, meropenem, Zoysn  R: cipro     Labs / Ancillary Data:    Estimated Creatinine Clearance: 28 mL/min (A) (based on SCr of 2.4 mg/dL (H)).     Recent Labs     10/08/21  0454 10/09/21  0510 10/10/21  0534   CREATININE 2.2* 2.8* 2.4*   BUN 37* 44* 46*   WBC 9.6 9.1 7.9

## 2021-10-11 NOTE — PROGRESS NOTES
ID Follow-up NOTE    CC:   Diabetic foot ulcer / infection  Antibiotics: Vancomycin, Meropenem    Admit Date: 10/1/2021  Hospital Day: 11    Subjective:     POD#4 --  R 4th ray, partial cuboid resection  L 5th ray resection    Patient c/o b/l foot pain, each side 'takes turns', hurts on L presently      Objective:     Patient Vitals for the past 8 hrs:   BP Temp Temp src Pulse Resp SpO2 Weight   10/11/21 0857 (!) 154/76 98.3 °F (36.8 °C) Oral 71 16 93 %    10/11/21 0506 123/86 97.8 °F (36.6 °C) Oral 63 16 95 % 212 lb 8.4 oz (96.4 kg)     I/O last 3 completed shifts: In: 2152 [P.O.:1420]  Out: 1300 [Urine:1300]  I/O this shift:  In: 360 [P.O.:360]  Out: -     EXAM:  GENERAL: No apparent distress.   RA  HEENT: Membranes moist, no oral lesion  NECK:  Supple, no lymphadenopathy  LUNGS: Clear b/l, no rales, no dullness  CARDIAC: RRR, no murmur appreciated  ABD:  Obese, + BS, soft / NT  EXT:  LE venous stasis, b/l foot dressing / ACE in place  NEURO: No focal neurologic findings  PSYCH: Orientation, sensorium, mood normal  LINES:  Peripheral iv       Data Review:  Lab Results   Component Value Date    WBC 7.3 10/11/2021    HGB 8.8 (L) 10/11/2021    HCT 26.8 (L) 10/11/2021    MCV 85.4 10/11/2021     10/11/2021     Lab Results   Component Value Date    CREATININE 2.0 (H) 10/11/2021    BUN 43 (H) 10/11/2021     10/11/2021    K 5.3 (H) 10/11/2021     10/11/2021    CO2 26 10/11/2021       Hepatic Function Panel:   Lab Results   Component Value Date    ALKPHOS 131 10/01/2021    ALT 13 10/01/2021    AST 16 10/01/2021    PROT 6.9 10/01/2021    PROT 6.6 07/21/2011    BILITOT <0.2 10/01/2021    BILIDIR <0.2 10/01/2021    IBILI see below 10/01/2021    LABALBU 2.6 10/01/2021       MICRO:  10/7 Surg cult: Gs 1+WBC, no organism; cult rare Ps aeruginosa, rare mixed skin sukhjinder  Antibiotic Interpretation ISAAC   cefepime Sensitive 8 mcg/mL   ciprofloxacin Resistant >2 mcg/mL   gentamicin Sensitive <=4 mcg/mL   meropenem Sensitive <=1 mcg/mL   piperacillin-tazobactam Sensitive <=16 mcg/mL   tobramycin Sensitive <=4 mcg/mL     10/2 Wound (not know if L or R): light Ps aeruginosa (R cipro), light 2nd E coli, light Enterococcus faecalis  Pseudomonas aeruginosa (1)  Antibiotic Interpretation ISAAC   cefepime Sensitive 8 mcg/mL   ciprofloxacin Resistant >2 mcg/mL   gentamicin Sensitive <=4 mcg/mL   meropenem Sensitive <=1 mcg/mL   piperacillin-tazobactam Sensitive <=16 mcg/mL   tobramycin Sensitive <=4 mcg/mL     Escherichia coli   Antibiotic Interpretation ISAAC   amoxicillin-clavulanate Intermediate 16/8 mcg/mL   ampicillin Resistant >16 mcg/mL   ceFAZolin Resistant >16 mcg/mL   cefepime Sensitive <=2 mcg/mL   cefTRIAXone Sensitive <=1 mcg/mL   cefuroxime Sensitive 8 mcg/mL   ciprofloxacin Resistant >2 mcg/mL   ertapenem Sensitive <=0.5 mcg/mL   gentamicin Sensitive <=4 mcg/mL   meropenem Sensitive <=1 mcg/mL   piperacillin-tazobactam Sensitive <=16 mcg/mL   trimethoprim-sulfamethoxazole Sensitive <=2/38 mcg/mL     Enterococcus  faecalis   Antibiotic Interpretation ISAAC   ampicillin Sensitive <=2 mcg/mL   vancomycin Sensitive 2 mcg/mL       IMAGING:  10/3 L foot MRI  Impression   Osteomyelitis involving the fifth metatarsal and fifth proximal phalanx with extensive osseous destruction. Mild osteomyelitis involving the lateral aspect of the cuboid. Prior partial first digit amputation     10/3 R foot MRI   Impression   Multiple prior amputations. Soft tissue ulceration lateral to the cuboid with mild acute osteomyelitis involving the lateral base of the fourth metatarsal and more distal plantar aspect of the remaining fourth metatarsal shaft as well as the lateral aspect of the cuboid.        Scheduled Meds:   heparin (porcine)  5,000 Units SubCUTAneous 3 times per day    vancomycin  750 mg IntraVENous Q24H    metoprolol succinate  50 mg Oral Daily    hydrALAZINE  50 mg Oral 3 times per day    lidocaine 1 % injection  5 mL IntraDERmal Once    sodium chloride flush  5-40 mL IntraVENous 2 times per day    meropenem  1,000 mg IntraVENous Q12H    insulin glargine  12 Units SubCUTAneous Nightly    insulin lispro  0-18 Units SubCUTAneous TID WC    insulin lispro  0-9 Units SubCUTAneous Nightly    fluconazole  200 mg IntraVENous Q24H    amitriptyline  100 mg Oral Nightly    aspirin EC  81 mg Oral Daily    atorvastatin  20 mg Oral Nightly    escitalopram  10 mg Oral Daily    gabapentin  400 mg Oral BID    methadone  140 mg Oral Daily    pantoprazole  40 mg Oral Daily    sodium chloride flush  5-40 mL IntraVENous 2 times per day    influenza virus vaccine  0.5 mL IntraMUSCular Prior to discharge       Continuous Infusions:   sodium chloride      sodium chloride 25 mL (10/10/21 5665)    sodium chloride      dextrose      sodium chloride 25 mL (10/11/21 1905)       PRN Meds:  hydrALAZINE, sodium chloride, sodium chloride flush, sodium chloride, labetalol, sodium chloride, glucose, dextrose, glucagon (rDNA), dextrose, albuterol, sodium chloride flush, sodium chloride, ondansetron **OR** ondansetron, polyethylene glycol, acetaminophen **OR** acetaminophen      Assessment:     Obesity (BMI 37), DM, neuropathy, HTN, HL, CKD, chronic pain  Hx DM foot infection / surgeries - has R TMA, L hallux resection    L foot wound infection / osteomyelitis   - MRI with 5th MT + prox 5th phalanx destruction, lateral cuboid edema, reviewed images with Radiologist 10/4    R foot wound infection / osteomyelitis    - MRI with cuboid, 4th MT OM, reviewed images with Radiologist 10/4    Cult polymicrobial with Ps aerug (R cipro), E coli, E faecalis    POD#4 --  R 4th ray, partial cuboid resection - NOT definitive  L 5th ray resection    FAHEEM, Cr down today    Plan:     Cont vancomycin, meropenem  Will f/u on surgical cult path fron 10/7    Wound care and further surgical intervention per Podiatry    Medical Decision Making:   The following items were considered in medical decision making:  Discussion of patient care with other providers  Reviewed clinical lab tests  Reviewed radiology tests  Reviewed other diagnostic tests/interventions  Independent review of radiologic images - reviewed MRI with Radiologist 10/4  Microbiology cultures and other micro tests reviewed      Discussed with pt  Naomi Sandoval MD

## 2021-10-11 NOTE — PLAN OF CARE
Problem: Falls - Risk of:  Goal: Will remain free from falls  Description: Will remain free from falls  Outcome: Met This Shift  Note: Pt remained free from falls during shift. Pt's bed is locked in lowest position with alarm on, call light, bedside table, and personal belongings within reach. Problem: Falls - Risk of:  Goal: Absence of physical injury  Description: Absence of physical injury  Outcome: Met This Shift  Note: Pt remained free from physical injury during shift. Problem: Pain:  Goal: Pain level will decrease  Description: Pain level will decrease  Outcome: Met This Shift  Note: Pt did not complain of any pain during shift. Problem: Pain:  Goal: Control of acute pain  Description: Control of acute pain  Outcome: Met This Shift  Note: Pt did not complain of any pain during shift. Problem: Pain:  Goal: Control of chronic pain  Description: Control of chronic pain  Outcome: Met This Shift  Note: Pt did not complain of any pain during shift. Problem: Skin Integrity:  Goal: Absence of new skin breakdown  Description: Absence of new skin breakdown  Outcome: Met This Shift  Note: Pt remained free from new skin breakdown during shift. Problem: OXYGENATION/RESPIRATORY FUNCTION  Goal: Patient will maintain patent airway  Outcome: Met This Shift  Note: Pt maintained a patent airway during shift. Problem: OXYGENATION/RESPIRATORY FUNCTION  Goal: Patient will achieve/maintain normal respiratory rate/effort  Description: Respiratory rate and effort will be within normal limits for the patient  Outcome: Met This Shift  Note: Pt's respiratory rate normal during shift. Problem: HEMODYNAMIC STATUS  Goal: Patient has stable vital signs and fluid balance  Outcome: Met This Shift  Note: Pt's vital signs stable during shift. Will continue to closely monitor.      Problem: Skin Integrity:  Goal: Will show no infection signs and symptoms  Description: Will show no infection signs and symptoms  Outcome: Ongoing  Note: Pt's vital signs stable, afebrile, no elevation to WBC. Pt is receiving IV antibiotics. Will continue to closely monitor.

## 2021-10-11 NOTE — PROGRESS NOTES
Clinical Pharmacy Progress Note    Vancomycin - Management by Pharmacy    Consult Date(s): 10/2/21  Consulting Provider(s): Dr. Jarod Guadalupe / Plan  1) Osteomyelitis of B/L feet - Vancomycin   Concurrent Antimicrobials:  Meropenem + Fluconazole   Day of Vanc Therapy: day #10   Current Dosing Method:  AUC  o Therapeutic Goal: -600mg/L*hr   Current Dose / Frequency / Plan / Rationale:   o Continue 750mg IV q24h. Random level this AM = 23.6 (drawn ~12hr after prior dose) - predicts AUC of 516 (in desired range).  Clinical condition will be monitored closely, and levels will be ordered as clinically indicated. Please call with questions--  Thanks--  Dionne Cardoso, PharmD, BCPS, BCGP  [id number] ([facility])   10/11/2021 9:34 AM        Interval Update:  Per Podiatry, pt will need return to OR for delayed primary closure this admission. Subjective/Objective:   Buck Llanes is a 60 y.o. female with a PMHx significant for CKD4, DVT (2004) on apixaban, DM2, chronic B/L LE ulcers, HTN, and chronic pain on methadone who is admitted with B/L LE osteomyelitis. Pt underwent B/L LE I&D with left 5th ray resection, right 4th ray resection, and rt partial cuboid resection on 10/7. Pharmacy is consulted to dose vancomycin. Current antibiotics:  Fluconazole 200mg IV q24h (10/2-current)  Meropenem 1000mg IV q12h (10/2-current)  Vancomycin - Pharmacy to dose   Intermittent dosing (10/2-10/10)   750mg IV q24h (10/10-current)      Ht Readings from Last 1 Encounters:   10/01/21 5' 2\" (1.575 m)       Wt Readings from Last 1 Encounters:   10/10/21 212 lb 15.4 oz (96.6 kg)     Vancomycin Level(s) / Doses:    Date Time Dose Level / Type of Level Interpretation   10/2 1401 2250 mg IV x1     10/3 0712   Random = 24.5 mcg/mL Drawn ~19 hrs after previous dose given. Hold dose.    10/4 0829  Random =18.1 mcg/mL  Re-dose 500 mg IV x1    1231 500 mg IV x1     10/5 0307  Random = 18.8 mcg/mL Drawn 14.5 hrs after dose    1231 500 mg IV x1  Re-dose with 500 mg IV x 1   10/6 0849  Random = 18.6 mcg/mL Drawn 18h after dose. Will re-dose 500 mg IV x1    1511 500 mg IV x 1     10/7 0520  Random = 15.6 mcg/mL Switch to AUC-based dosing. Schedule vancomycin 750 mg IV Q24 hours and re-assess random 10/8.    1705 750 mg IV x 1     10/8 0454  Random = 18.6 mcg/mL Continue current regimen of 750 mg IV Q24h. Predicted to achieve AUC of 538 mg/L*hr.   10/8 15:25 750mg IV x1     10/9 14:18  Random = 18.6 mcg/mL Drawn ~24 hrs after previous dose. 10/9 16:40 500mg IV x1     10/10 16:58 750mg IV x1     10/11 05:21  Random = 23.6 mcg/mL Drawn ~12 hr after prior dose given, predicted  on 750mg q24h                   Cultures & Sensitivities:  Date Site Micro Susceptibility / Result   10/2 Foot wound Pseudomonas aeruginosa     S: cefepime, gent, meropenem, Zosyn, tobramycin  R: Cipro     Foot wound E. coli  S: Cefepime, ceftriaxone, cefuroxime, ertapenem, gent, meropenem, Zosyn, Bactrim  R: Cipro    Foot wound  Enterococcus faecalis S: Ampicillin, Vancomycin      Foot wound Providencia stuartii    10/7 Tissue foot Pseudomonas aeruginosa S: cefepime, gent, tobramycin, meropenem, Zoysn  R: cipro     Labs / Ancillary Data:    Estimated Creatinine Clearance: 33 mL/min (A) (based on SCr of 2 mg/dL (H)).     Recent Labs     10/09/21  0510 10/10/21  0534 10/11/21  0521   CREATININE 2.8* 2.4* 2.0*   BUN 44* 46* 43*   WBC 9.1 7.9 7.3

## 2021-10-11 NOTE — PROGRESS NOTES
Podiatric Surgery Daily Progress Note  Segun Leon      Subjective :   Patient seen and examined this am at the bedside. Patient denies any acute overnight events. Patient denies N/V/F/C/SOB. Patient denies calf pain, thigh pain, chest pain. Review of Systems: A 12 point review of symptoms is unremarkable with the exception of the chief complaint. Patient specifically denies nausea, fever, vomiting, chills, shortness of breath, chest pain, abdominal pain, constipation or difficulty urinating. Objective     /86   Pulse 63   Temp 97.8 °F (36.6 °C) (Oral)   Resp 16   Ht 5' 2\" (1.575 m)   Wt 212 lb 15.4 oz (96.6 kg)   LMP 10/23/2015   SpO2 95%   BMI 38.95 kg/m²      I/O:    Intake/Output Summary (Last 24 hours) at 10/11/2021 0700  Last data filed at 10/10/2021 1656  Gross per 24 hour   Intake 720 ml   Output    Net 720 ml              Wt Readings from Last 3 Encounters:   10/10/21 212 lb 15.4 oz (96.6 kg)   08/12/21 200 lb (90.7 kg)   06/18/21 218 lb 0.6 oz (98.9 kg)       LABS:    Recent Labs     10/10/21  0534 10/10/21  0534 10/11/21  0134 10/11/21  0521   WBC 7.9  --   --  7.3   HGB 8.9*   < > 8.8* 8.8*   HCT 27.3*   < > 27.4* 26.8*     --   --  296    < > = values in this interval not displayed. Recent Labs     10/11/21  0521      K 5.3*      CO2 26   BUN 43*   CREATININE 2.0*        No results for input(s): PROT, INR, APTT in the last 72 hours. LOWER EXTREMITY EXAMINATION    Dressing to bilateral LE intact. No strikethrough noted to the external dressing. Moderate sanguinous drainage noted to the internal layers of the dressing.      VASCULAR: DP and PT pulses nonpalpable bilateral.  Upon hand-held Doppler examination, DP and PT signals weakly biphasic bilateral. CFT is brisk to the distal aspect of the foot bilateral. Skin temperature is warm to cool from proximal to distal with no focal calor noted.  +2 pitting edema noted bilateral.  No pain with calf compression b/l.     NEUROLOGIC: Gross and epicritic sensation is diminished b/l. Protective sensation is diminished at all pedal sites b/l. DERMATOLOGIC:   Right lower extremity:  Full-thickness ulceration noted to the plantar aspect beginning distally at the level of the previous fourth metatarsal head and extending proximally to the cuboid with cuboid bone exposed. Retention sutures intact. Surgifoam left intact to proximal aspect of wound bed. Wound base granular. No fluctuance, crepitus, active drainage, erythema, or malodor noted. No dehiscence noted to wound edges. Left lower extremity:  Full-thickness ulceration noted plantar lateral aspect beginning at the base of the fourth digit and extending proximally towards the base of the fourth metatarsal and laterally towards the third metatarsal with fourth metatarsal base and cuboid bone exposed.  Retention sutures intact. Surgifoam left intact to wound bed. Wound base granular. No fluctuance, crepitus, active drainage, erythema, or malodor noted. No dehiscence noted to wound edges. MUSCULOSKELETAL: Muscle strength is 4/5 for all pedal groups tested. No pain with palpation of the foot or ankle b/l. Ankle joint ROM is decreased in dorsiflexion with the knee extended.  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 S/p left fifth ray resection and right fourth ray and partial cuboid resection.     IMAGING:  X-ray right foot 10/7/2021-postop  Narrative   EXAM: XR FOOT RIGHT (2 VIEWS)       INDICATION:  s/p 4th ray resection and partial cuboid resection; packed open       COMPARISON: 10/3/2021, 10/2/2021       FINDINGS:       There are postsurgical changes of fourth ray resection and partial cuboid resection. Postsurgical changes of prior fifth ray resection and first, second, and third toe resections again seen.           Impression       Expected postsurgical changes of right fourth ray and partial cuboid resection.        X-ray left foot 10/7/2021-postop  Narrative   EXAM: XR FOOT LEFT (2 VIEWS)       INDICATION:  s/p 5th ray resection & cuboid bone biopsy; packed open       COMPARISON: 10/3/2021, 10/2/2021       FINDINGS:       There are postsurgical changes of fifth ray resection. There is a small amount of postsurgical subcutaneous gas. Redemonstration of prior amputation of the first digit at the level of the base of the first proximal phalanx again seen.           Impression       Expected postsurgical changes of left fifth ray resection. MRI right foot 10/3/2021  Narrative   EXAMINATION: Magnetic resonance imaging (MRI) of the right foot without contrast       HISTORY: Osteomyelitis 5th metatarsal base; rule out OM cuboid       TECHNIQUE: MRI of the right foot was performed using multiple pulse sequences in multiple planes without contrast.       COMPARISON: Radiograph dated 10/2/2021       FINDINGS:        There have been prior first second third MTP joint, fourth transmetatarsal, and fifth TMT joint amputations. There is a soft tissue ulcer lateral to the base of the fourth metatarsal with adjacent T1 hypointensity in marrow edema involving the base of    the fourth metatarsal as well as the lateral aspect of the cuboid representing osteomyelitis. There is also soft tissue swelling more distally along the plantar aspect of the remaining fourth metatarsal shafts with osteomyelitis along the plantar cortex    of the remaining fourth metatarsal. There is no acute fracture or dislocation. There is a degenerative subchondral cyst in the navicular. No abscess is seen within limits of noncontrast examination. Visualized tendons and plantar fascia are unremarkable.           Impression   Multiple prior amputations.    Soft tissue ulceration lateral to the cuboid with mild acute osteomyelitis involving the lateral base of the fourth metatarsal and more distal plantar aspect of the remaining fourth metatarsal shaft as well as the lateral aspect of the cuboid.        MRI left foot 10/3/2021  Narrative   EXAMINATION: Magnetic resonance imaging (MRI) of the left foot without contrast       HISTORY: Osteomyelitis 5th metatarsal       TECHNIQUE: MRI of the left foot was performed using multiple pulse sequences in multiple planes without contrast.       COMPARISON: Radiograph dated 10/2/2021       FINDINGS:        There has been prior amputation of the first digit at the level of the base of the first proximal phalanx. There is soft tissue ulceration lateral to the fifth mid metatarsal with surrounding edema and phlegmon in the subcutaneous tissues. There is    extensive destruction of the mid to distal aspect of the fifth metatarsal and base of the fifth proximal phalanx representing sequelae of osteomyelitis. There is also mild osteomyelitis extending to the base of the fifth metatarsal as well as along the    lateral aspect of the cuboid.           Impression   Osteomyelitis involving the fifth metatarsal and fifth proximal phalanx with extensive osseous destruction. Mild osteomyelitis involving the lateral aspect of the cuboid. Prior partial first digit amputation.      Lower extremity arterial duplex 10/4/2021-preliminary  Right   Right CRISTIAN was not available due to patient non-compliance . There are multiphasic waveforms in the common femoral artery indicating no   aortoiliac inflow disease. There is atherosclerotic plaque involving the superficial femoral and   popliteal arteries with no significantly elevated velocities. Left   Left CRISTIAN was not available due to patient non-compliance . There are multiphasic waveforms in the common femoral artery indicating no   aortoiliac inflow disease. There is atherosclerotic plaque involving the superficial femoral and   popliteal arteries with no significantly elevated velocities. The peroneal artery is not visualized.    There is no previous exam for comparison.        ASSESSMENT/PLAN  -S/p bilateral lower extremity I&D with left fifth ray resection, right fourth ray resection, right partial cuboid resection, 10/7/2021  -Diabetic foot ulceration with osteomyelitis, Carpenter 3, right lower extremity  -Diabetic foot ulceration with osteomyelitis, Carpenter 3, left lower extremity  -PVD, bilateral lower extremity  -Edema, bilateral lower extremity  -Diabetes mellitus type 2 with peripheral neuropathy  -History of noncompliance with weightbearing status    -Patient examined and evaluated at the bedside   -VSS.  No leukocytosis noted. -ESR >120, CRP 205.1  -Imaging reviewed, noted above  -Bone biopsy taken right foot, sent for pathology  -Bone biopsy taken of the left cuboid, sent for pathology  -Wound culture right foot: Pseudomonas aeruginosa, E coli, Enterococcus faecalis (10/2)  -Dressing applied to bilateral lower extremity consisting of gauze, ABD, DSD, Ace bandage  -Continue antibiotics per ID recommendations.  -Non-weightbearing bilateral lower extremity  -Lengthy discussion with patient about intraoperative findings of right cuboid bone being soft and likelihood of it being infected. Discussed with patient that cuboid may not be salvageable at this time. Discussed with patient that removal of cuboid will leave her with a very unstable foot. Patient understands but insists that she does not want a more proximal amputation at this time. Discussed options pending bone biopsy of cuboid including further surgical resection versus attempted IV antibiotics. Discussed with patient that IV antibiotics may not be enough to treat the infected bone at this time. Patient understands but insists that she does not want a more proximal amputation.  -Pending biopsy results patient will likely return to the OR tomorrow for further debridement with possible wound vac placement or skin graft application.      DISPO: S/p bilateral lower extremity I&D with fifth ray resection left foot, fourth ray resection right foot, partial cuboid resection right foot. Patient will be brought back to the OR for delayed primary closure this hospital admission pending biopsy results and surgical clearance per medicine team.  Continue antibiotics per ID recommendations. Will follow up on bone biopsies for bilateral lower extremity.     Discussed assessment and plan with Dr. Delroy Quinonez DPM.    Madison Contreras DPM  10/11/21  7:00 AM

## 2021-10-11 NOTE — PROGRESS NOTES
Progress Note    Admit Date: 10/1/2021  Day: 10  Diet: ADULT DIET; Regular; 3 carb choices (45 gm/meal); Low Sodium (2 gm); Low Potassium (Less than 3000 mg/day); 2000 ml  Adult Oral Nutrition Supplement; Renal Oral Supplement    CC: B/L leg pain for 2-6 weeks    Interval history:   NAONE. Patient was seen and examined this am. HDS overnight, sating well on 1L NC. Patient reports foot pain is under controlled at 3/10, denies f/c, SOB, palpitations, abdominal pain, c/d or urinary symptoms. Tolerating diet. HPI:   Tatiana Thomas. Sav Guaman is a 59 yo F with PMH CKD4, DVT (2004; on Eqliquis), T2DM c/b b/l lower extremity ulcers (follows with Podiatry outpatient, last visit 9/27/21), HTN, narcotic-dependent chronic pain (on Methadone 140 mg/day) who presented for progressively worsening b/l lower extremity pain for the 2 weeks. Patient states she presented today with pain in legs so severe that it is now inhibiting her ability to ambulate. She endorses that the legs are also more swollen and warm to touch with pain when she touches it. She endorses she tries to eat a healthy diet low in salt, she also denies any major weight fluctuations recently.     On ROS, denies fever/chills, nausea/vomiting, diarrhea/constipation, urinary issues including dysuria, hematuria, or changes to amount or flow of urine. She denies abdominal pain.      On arrival to [facility] ED, VSS. Patient appeared extremely sleepy on physical exam. Chemistry significant for Na 133, BUN 42/Cr 2.5. Pro-BNP elevated 4,733 (baseline 2-3k). Trops 0.04. CBC with Hgb 9.1 Hct 28.5 at baseline. CXR with mild cardiomegaly and interstitial edema.  She was given IV Lasix 40 in the ED and admitted to the floor.        Medications:     Scheduled Meds:   heparin (porcine)  5,000 Units SubCUTAneous 3 times per day    vancomycin  750 mg IntraVENous Q24H    metoprolol succinate  50 mg Oral Daily    hydrALAZINE  50 mg Oral 3 times per day    lidocaine 1 % injection  5 mL IntraDERmal Once    sodium chloride flush  5-40 mL IntraVENous 2 times per day    meropenem  1,000 mg IntraVENous Q12H    insulin glargine  12 Units SubCUTAneous Nightly    insulin lispro  0-18 Units SubCUTAneous TID WC    insulin lispro  0-9 Units SubCUTAneous Nightly    fluconazole  200 mg IntraVENous Q24H    amitriptyline  100 mg Oral Nightly    aspirin EC  81 mg Oral Daily    atorvastatin  20 mg Oral Nightly    escitalopram  10 mg Oral Daily    gabapentin  400 mg Oral BID    methadone  140 mg Oral Daily    pantoprazole  40 mg Oral Daily    sodium chloride flush  5-40 mL IntraVENous 2 times per day    influenza virus vaccine  0.5 mL IntraMUSCular Prior to discharge     Continuous Infusions:   sodium chloride      sodium chloride 25 mL (10/10/21 5409)    sodium chloride      dextrose      sodium chloride 25 mL (10/11/21 9211)     PRN Meds:hydrALAZINE, sodium chloride, sodium chloride flush, sodium chloride, labetalol, sodium chloride, glucose, dextrose, glucagon (rDNA), dextrose, albuterol, sodium chloride flush, sodium chloride, ondansetron **OR** ondansetron, polyethylene glycol, acetaminophen **OR** acetaminophen    Objective:   Vitals:   T-max:  Patient Vitals for the past 8 hrs:   BP Temp Temp src Pulse Resp SpO2   10/11/21 1221 (!) 142/62 98.1 °F (36.7 °C) Oral 66 16 94 %   10/11/21 0857 (!) 154/76 98.3 °F (36.8 °C) Oral 71 16 93 %       Intake/Output Summary (Last 24 hours) at 10/11/2021 1331  Last data filed at 10/11/2021 0945  Gross per 24 hour   Intake 1300 ml   Output 1300 ml   Net 0 ml       Physical Exam  Physical Exam  Constitutional:       General: She is not in acute distress. HENT:      Head: Normocephalic and atraumatic. Nose: No congestion or rhinorrhea. Mouth/Throat:      Mouth: Mucous membranes are moist.      Pharynx: No oropharyngeal exudate or posterior oropharyngeal erythema. Eyes:      General: No scleral icterus.      Conjunctiva/sclera: Conjunctivae normal.   Cardiovascular:      Rate and Rhythm: Normal rate and regular rhythm. Heart sounds: No murmur heard. No gallop. Pulmonary:      Effort: No respiratory distress. Breath sounds: No wheezing or rales. Abdominal:      General: There is no distension. Palpations: Abdomen is soft. Tenderness: There is no abdominal tenderness. There is no guarding. Musculoskeletal:      Cervical back: Neck supple. No tenderness. Right lower leg: Edema present. Left lower leg: Edema present. Comments: Chronic venous changes on b/l LE, mild tenderness, significant swelling. B/L feet with wounds, not purulent    Skin:     General: Skin is dry. Capillary Refill: Capillary refill takes less than 2 seconds. Neurological:      General: No focal deficit present. Mental Status: She is alert and oriented to person, place, and time. LABS:    CBC:   Recent Labs     10/09/21  0510 10/09/21  1645 10/10/21  0534 10/11/21  0134 10/11/21  0521   WBC 9.1  --  7.9  --  7.3   HGB 8.8*   < > 8.9* 8.8* 8.8*   HCT 26.8*   < > 27.3* 27.4* 26.8*     --  301  --  296   MCV 85.1  --  85.5  --  85.4    < > = values in this interval not displayed. Renal:    Recent Labs     10/09/21  0510 10/10/21  0534 10/11/21  0521   * 137 139   K 4.8 5.5* 5.3*    105 107   CO2 26 25 26   BUN 44* 46* 43*   CREATININE 2.8* 2.4* 2.0*   GLUCOSE 109* 168* 78   CALCIUM 8.6 8.5 8.6   MG 1.70* 2.20 2.00   ANIONGAP 7 7 6     Hepatic:   No results for input(s): AST, ALT, BILITOT, BILIDIR, PROT, LABALBU, ALKPHOS in the last 72 hours. Troponin:   No results for input(s): TROPONINI in the last 72 hours. BNP: No results for input(s): BNP in the last 72 hours. Lipids: No results for input(s): CHOL, HDL in the last 72 hours. Invalid input(s): LDLCALCU, TRIGLYCERIDE  ABGs:  No results for input(s): PHART, TAE7RST, PO2ART, EHM9NYG, BEART, THGBART, P2AQDCHS, HKF4AJQ in the last 72 hours.     INR: evidence for osteomyelitis involving the fifth phalanges and fifth metatarsal. Patient's bones are osteopenic. XR FOOT RIGHT (MIN 3 VIEWS)   Final Result      Numerous osteotomies with soft tissue swelling and possible ulcer lateral aspect of the right midfoot. No plain radiographic evidence for osteomyelitis, however plain film findings for osteomyelitis are often late findings. 3 views left foot      FINDINGS:      There is a mottled appearance of the phalanges of the right fifth digit as well as the fifth metatarsal. The remaining metatarsals unremarkable in appearance. Patient's had prior osteotomy of the first distal phalangeal tuft. IMPRESSION:      Plain radiographic evidence for osteomyelitis involving the fifth phalanges and fifth metatarsal. Patient's bones are osteopenic. XR CHEST PORTABLE   Final Result      Mild cardiomegaly and mild interstitial edema. Assessment/Plan:      Jaswinder Prom. Taryn Balderas is a 61 yo F with PMH CKD4, DVT (on Eqliquis), T2DM c/b b/l lower extremity ulcers (follows with Podiatry outpatient, last visit 9/27/21), HTN, history of pain medication addiction (on Methadone 140 mg/day) who presented for progressively worsening b/l lower extremity pain for the 2 weeks. Pre-operative evaluation  Plan for repeat I&D and possible partial cuboid resection and possible delayed primary closure with Podiatry. Patient denies any SOB, CP, orthopnea or PND. She has no JVD or crackles on exam.   - RCRI showed Class IV Risk, 15% 30-day risk of death, MI, or cardiac arrest.   Benefits of surgical intervention outweigh risk, okay to proceed with surgical intervention with general anesthesia. - Will place NPO and hold heparin ggt at midnight    Osteomyelitis of b/l feet  Recently debrided outpatient on 9/27/21, do not appear acutely infected. Patient on Clinda and Cipro.  XR of both feet show osteomyelitis involving 5th phalanges and fifth metatarsal. S/P bilateral lower extremity I&D with fifth ray resection left foot, fourth ray resection right foot, partial cuboid resection right foot. - Podiatry following  - wound care per Podiatry  - Wound cultures grew pseudomonas, E coli and enterococcus  - Antibiotics per ID, vanc, merem, fluconazole  - Possible return to OR for wound delayed primary closure tomorrow  - surgical path pending  -Patient is Medically clear to go to OR    FAHEEM on CKD 4 (improving)  Baseline creat is appears around 2. Hypoalbuminemia, Albumin 2.6 on presentation. Suspect from diabetic nephropathy in setting of poorly controlled diabetes. HbA1c 10/2021 at 8.3. Low BP may also be playing a part. - Kidney func stable  - Avoid nephrotoxic agents  - Nephrology following     Acute blood loss anemia requiring transfusions  Likely 2/2 blood loss in OR in the setting of AOCD from CKD. Iron 16, iron sat 9, transferrin 7.8, TIBC 174. Requiring 4 uRBC transfusion so far. - CBC stable, H/H goal > 7    Chronic diastolic Heart Failure  Last Echo 2018 with EF 50-55%. Evidence of PAH which may be contributing. CXR 10/1 with mild cardiomegaly and interstitial edema. Suspect rt heart failure from undiagnosed sleep apnea. Echo with EF 55%, LVH and estimated pulm artery systolic pressure is at 60 mmHg   - Lasix 20 mg IV daily held for worsening FAHEEM  - strict I/O's, daily weights   - Low salt diet    Hyperkalemia   -Suspect 2/2 to blood transfuions  - EKG without changes  - Continue to monitor daily RFP    Hypertension  - Hold parameters placed for low BP   - Hydralazine 50 mg TID  - Metoprolol 50 mg QD  - Hold cardura for now unless directed otherwise by nephro     Hyponatremia (resolved)  Na 133 on admission.  - continue to monitor      T2DM   BS 91 - recent outpatient visit records show good blood sugar control. HbA1c 6.8 on 06/17/21.   - HbA1c on  10/2021 at 8.3  - on wt loss, exercise, good diet and medication compliance  -lantus 12 units plus HDSS, hypoglycemia protocol     Hx of Opioid Pain Medication Use, Now on Methadone   - Continue Methadone 140 mg daily         Code Status: Full Code   FEN: ADULT DIET; Regular; 3 carb choices (45 gm/meal); Low Sodium (2 gm); Low Potassium (Less than 3000 mg/day); 2000 ml  Adult Oral Nutrition Supplement;  Renal Oral Supplement   PPX: heparin TID  DISPO: Natalya Cox MD, PGY-1  10/11/21  1:31 PM    This patient has been staffed and discussed with Carl Mejia MD.

## 2021-10-11 NOTE — PLAN OF CARE
Podiatric Surgery Daily Plan of Care Note      Discussed surgical intervention with patient at bedside. All potential risks, benefits, and complications were discussed with the patient prior to the scheduling of surgery. No guarantees or promises were made to what the outcomes will be. The patient wished to proceed with surgery, and informed written consent was obtained, signed, and placed on the patient's chart. -NPO at midnight   -Primary team to medically clear patient for surgery   -Hold Heparin 8 hours prior to surgery    Plan was discussed with Dr. Ab AGUIRRE.P.GOLDIE Jimenez DPM   Podiatric Resident PGY1  Pager [phone] or Robby  10/11/2021, 6:24 PM

## 2021-10-11 NOTE — PROGRESS NOTES
Pre-Operative Note  Resident Note     Patient: Remberto Burleson     Procedure: Repeat incision and drainage of all nonviable soft tissue and necrotic bone with possible cuboid resection or bone biopsies as needed with possible delayed primary closure and skin graft application versus wound VAC application to bilateral lower extremities    Clearance for surgery: per Internal Medicine    Consent: Procedure was discussed at length with patient and all questions were answered to completion, consent obtained and placed in chart     Labs:        Recent Labs     10/11/21  0521 10/12/21  0525   WBC 7.3 7.7   HGB 8.8* 9.1*   HCT 26.8* 27.7*   MCV 85.4 85.6    324        Recent Labs     10/11/21  0521 10/12/21  0432    139   K 5.3* 5.7*    105   CO2 26 27   BUN 43* 40*   CREATININE 2.0* 1.7*      No results for input(s): AST, ALT, ALB, BILIDIR, BILITOT, ALKPHOS in the last 72 hours. No results for input(s): LIPASE, AMYLASE in the last 72 hours. Recent Labs     10/11/21  1848   INR 0.93      No results for input(s): CKTOTAL, CKMB, CKMBINDEX, TROPONINI in the last 72 hours.     Pre-op Medications:   Current Facility-Administered Medications   Medication Dose Route Frequency Provider Last Rate Last Admin    dextrose 50 % IV solution  12.5 g IntraVENous PRN Lionel Umaña MD        dextrose 5 % solution  100 mL/hr IntraVENous PRN Lionel Umaña MD       [facility] by provider] heparin (porcine) injection 5,000 Units  5,000 Units SubCUTAneous 3 times per day Delia Ford MD   5,000 Units at 10/11/21 2157    vancomycin (VANCOCIN) 750 mg in dextrose 5 % 250 mL IVPB  750 mg IntraVENous Q24H Lionel Umaña MD   Stopped at 10/12/21 1827    metoprolol succinate (TOPROL XL) extended release tablet 50 mg  50 mg Oral Daily Lionel Umaña MD   50 mg at 10/12/21 0835    hydrALAZINE (APRESOLINE) tablet 50 mg  50 mg Oral 3 times per day Delia Ford MD   50 mg at 10/12/21 2206    hydrALAZINE (APRESOLINE) injection 5 mg  5 mg IntraVENous Q6H PRN Chaparrita Tovar MD        0.9 % sodium chloride infusion   IntraVENous PRN Qi Sue MD        lidocaine PF 1 % injection 5 mL  5 mL IntraDERmal Once Frankey Nando [facility]        sodium chloride flush 0.9 % injection 5-40 mL  5-40 mL IntraVENous 2 times per day New Glarussintia Collier [facility]   10 mL at 10/12/21 2126    sodium chloride flush 0.9 % injection 5-40 mL  5-40 mL IntraVENous PRN Frankey Nando [facility]        0.9 % sodium chloride infusion  25 mL IntraVENous PRN Frankey Nando [facility]   Stopped at 10/10/21 2136    labetalol (NORMODYNE;TRANDATE) injection syringe 10 mg  10 mg IntraVENous Q4H PRN Frankey Charleston, [facility]        0.9 % sodium chloride infusion   IntraVENous PRN Frankey Nando [facility]        meropenem (MERREM) 1,000 mg in sodium chloride 0.9 % 100 mL IVPB (mini-bag)  1,000 mg IntraVENous Q12H Frankey Charleston, [facility]   Stopped at 10/12/21 1927    glucose (GLUTOSE) 40 % oral gel 15 g  15 g Oral PRN New Glarussintia Collier [facility]        dextrose 50 % IV solution  12.5 g IntraVENous PRN [place] Glarussintia Collier [facility]        glucagon (rDNA) injection 1 mg  1 mg IntraMUSCular PRN Frankey Morrow [facility]        dextrose 5 % solution  100 mL/hr IntraVENous PRN New Glarussintia Collier [facility]        insulin glargine (LANTUS;BASAGLAR) injection pen 12 Units  12 Units SubCUTAneous Nightly Frankey JERED Collier   12 Units at 10/12/21 2013    insulin lispro (1 Unit Dial) 0-18 Units  0-18 Units SubCUTAneous TID WC Frankey Charleston, DP   6 Units at 10/11/21 1654    insulin lispro (1 Unit Dial) 0-9 Units  0-9 Units SubCUTAneous Nightly Frankey NandoJERED   3 Units at 10/12/21 2013    fluconazole (DIFLUCAN) 200 mg IVPB  200 mg IntraVENous Q24H Frankey Charleston,  mL/hr at 10/12/21 1719 200 mg at 10/12/21 1719    albuterol (PROVENTIL) nebulizer solution 2.5 mg  2.5 mg Nebulization Q6H PRN Frankey Charleston, DPM        amitriptyline (ELAVIL) tablet 100 mg  100 mg Oral Nightly Frankey Charleston, DPM   100

## 2021-10-11 NOTE — CARE COORDINATION
Case Management Assessment           Daily Note                 Date/ Time of Note: 10/11/2021 10:30 AM         Note completed by: Coni Rondon RN    Patient Name: Yoli Avilez  YOB: 1965    Diagnosis:Systolic CHF, acute ([facility] [address] [I50.21]  Acute on chronic congestive heart failure, unspecified heart failure type ([facility] [address] [I50.9]  Patient Admission Status: Inpatient    Date of Admission:10/1/2021  6:16 PM Length of Stay: 10 GLOS: GMLOS: 8.3    Current Plan of Care:   Nephro / ID following :      S/p bilateral lower extremity I&D with left fifth ray resection, right fourth ray resection, right partial cuboid resection, 10/7/2021       Anticipate need PICC and iv antibiotics after discharge________________________________________________________________________________________  PT AM-PAC:   / 24 per last evaluation on: pending  Ordered  10/4  Will follow up  POST OP for  Dispo planning rec:      OT AM-PAC:   / 24 per last evaluation on:   pending  Ordered  10/4    DME Needs for discharge: TBD  Vs  Defer to  SNF    ________________________________________________________________________________________  Discharge Plan: Home with Home Health Care: VS  SNF    Referral made to [facility]     Tentative discharge date: tbd     Current barriers to discharge: IV atbx  ,  Cx pending :  placment   Medical  Podiatry clearance      Patient will be brought back to the OR for delayed primary closure this hospital admission pending biopsy results and surgical clearance per medicine team    Referrals completed: Not Applicable  [facility] following /  SNF referrals  Pending     Resources/ information provided: Not indicated at this time  ________________________________________________________________________________________  Case Management Notes:    Patient will likely need  SNF   D/t  Long term IV atbx  needed and  Will be  NWB  :  CM will assist  With  SNF  List  And make referral  Based on preference and choice Patient  Provided  SNF list and  1 st choice is  [facility] [address] but they are out of   Company  Can accept: Will discuss  Further with pt : Will follow up witpt  For  Additional referrals :    CM met wit pt and dgtr at  Bedside  Provided  List again and requested  referral to [facility]  :  Cm spoke with Kori Emanuel in admissions and they are currently not taking out side admissions at this  time . Will get  additional requests  CM met with them at 1600 and  Still have  No additional choices    But wll proved  Some before leaving this evening . Patient will return to OR pending Biopsies  And  Cont IV atbx     Not medically stable for  D/C          Casey Valencia and her family were provided with choice of provider; she and her family are in agreement with the discharge plan.     Care Transition Patient: Lianna Sun RN  [facility]  Case Management Department  Ph: [phone]

## 2021-10-12 NOTE — PROGRESS NOTES
Progress Note    Admit Date: 10/1/2021  Day: 11  Diet: Diet NPO Exceptions are: Sips of Water with Meds    CC: B/L leg pain for 2-6 weeks    Interval history:   NAONE. Patient was seen and examined this am. Hypertensive but otherwise HDS. She denies any f/c, CP, SOB, abdominal pain, c/d or urinary symptoms. Complaining of some lower back and hip back from laying in bed. Reports she did not get a good night sleep and is tired this am. Plan for OR today. NPO since midnight. HPI:   Valeria Sharma. Nahed Suh is a 61 yo F with PMH CKD4, DVT (2004; on Eqliquis), T2DM c/b b/l lower extremity ulcers (follows with Podiatry outpatient, last visit 9/27/21), HTN, narcotic-dependent chronic pain (on Methadone 140 mg/day) who presented for progressively worsening b/l lower extremity pain for the 2 weeks. Patient states she presented today with pain in legs so severe that it is now inhibiting her ability to ambulate. She endorses that the legs are also more swollen and warm to touch with pain when she touches it. She endorses she tries to eat a healthy diet low in salt, she also denies any major weight fluctuations recently.     On ROS, denies fever/chills, nausea/vomiting, diarrhea/constipation, urinary issues including dysuria, hematuria, or changes to amount or flow of urine. She denies abdominal pain.      On arrival to [place] [address] ED, VSS. Patient appeared extremely sleepy on physical exam. Chemistry significant for Na 133, BUN 42/Cr 2.5. Pro-BNP elevated 4,733 (baseline 2-3k). Trops 0.04. CBC with Hgb 9.1 Hct 28.5 at baseline. CXR with mild cardiomegaly and interstitial edema.  She was given IV Lasix 40 in the ED and admitted to the floor.        Medications:     Scheduled Meds:   [Held by provider] heparin (porcine)  5,000 Units SubCUTAneous 3 times per day    vancomycin  750 mg IntraVENous Q24H    metoprolol succinate  50 mg Oral Daily    hydrALAZINE  50 mg Oral 3 times per day    lidocaine 1 % injection  5 mL IntraDERmal Once    sodium chloride flush  5-40 mL IntraVENous 2 times per day    meropenem  1,000 mg IntraVENous Q12H    insulin glargine  12 Units SubCUTAneous Nightly    insulin lispro  0-18 Units SubCUTAneous TID WC    insulin lispro  0-9 Units SubCUTAneous Nightly    fluconazole  200 mg IntraVENous Q24H    amitriptyline  100 mg Oral Nightly    aspirin EC  81 mg Oral Daily    atorvastatin  20 mg Oral Nightly    escitalopram  10 mg Oral Daily    gabapentin  400 mg Oral BID    methadone  140 mg Oral Daily    pantoprazole  40 mg Oral Daily    sodium chloride flush  5-40 mL IntraVENous 2 times per day    influenza virus vaccine  0.5 mL IntraMUSCular Prior to discharge     Continuous Infusions:   sodium chloride      sodium chloride 25 mL (10/10/21 1805)    sodium chloride      dextrose      sodium chloride 25 mL (10/11/21 3957)     PRN Meds:hydrALAZINE, sodium chloride, sodium chloride flush, sodium chloride, labetalol, sodium chloride, glucose, dextrose, glucagon (rDNA), dextrose, albuterol, sodium chloride flush, sodium chloride, ondansetron **OR** ondansetron, polyethylene glycol, acetaminophen **OR** acetaminophen    Objective:   Vitals:   T-max:  Patient Vitals for the past 8 hrs:   BP Temp Temp src Pulse Resp SpO2   10/12/21 0409 (!) 152/80 97.8 °F (36.6 °C) Oral 67 18 94 %   10/12/21 0001 (!) 146/78 98.5 °F (36.9 °C) Axillary 68 16 95 %       Intake/Output Summary (Last 24 hours) at 10/12/2021 0656  Last data filed at 10/11/2021 1358  Gross per 24 hour   Intake 840 ml   Output    Net 840 ml       Physical Exam  Physical Exam  Constitutional:       General: She is not in acute distress. HENT:      Head: Normocephalic and atraumatic. Nose: No congestion or rhinorrhea. Mouth/Throat:      Mouth: Mucous membranes are moist.      Pharynx: No oropharyngeal exudate or posterior oropharyngeal erythema. Eyes:      General: No scleral icterus.      Conjunctiva/sclera: Conjunctivae normal.   Cardiovascular: Rate and Rhythm: Normal rate and regular rhythm. Heart sounds: No murmur heard. No gallop. Pulmonary:      Effort: No respiratory distress. Breath sounds: No wheezing or rales. Abdominal:      General: There is no distension. Palpations: Abdomen is soft. Tenderness: There is no abdominal tenderness. There is no guarding. Musculoskeletal:      Cervical back: Neck supple. No tenderness. Right lower leg: Edema present. Left lower leg: Edema present. Comments: Chronic venous changes on b/l LE, mild tenderness, significant swelling. B/L feet with wounds and post-surgical changes, not purulent    Skin:     General: Skin is dry. Capillary Refill: Capillary refill takes less than 2 seconds. Neurological:      General: No focal deficit present. Mental Status: She is alert and oriented to person, place, and time. LABS:    CBC:   Recent Labs     10/10/21  0534 10/10/21  0534 10/11/21  0134 10/11/21  0521 10/12/21  0525   WBC 7.9  --   --  7.3 7.7   HGB 8.9*   < > 8.8* 8.8* 9.1*   HCT 27.3*   < > 27.4* 26.8* 27.7*     --   --  296 324   MCV 85.5  --   --  85.4 85.6    < > = values in this interval not displayed. Renal:    Recent Labs     10/10/21  0534 10/11/21  0521 10/12/21  0432    139 139   K 5.5* 5.3* 5.7*    107 105   CO2 25 26 27   BUN 46* 43* 40*   CREATININE 2.4* 2.0* 1.7*   GLUCOSE 168* 78 76   CALCIUM 8.5 8.6 9.2   MG 2.20 2.00 2.10   ANIONGAP 7 6 7     Hepatic:   No results for input(s): AST, ALT, BILITOT, BILIDIR, PROT, LABALBU, ALKPHOS in the last 72 hours. Troponin:   No results for input(s): TROPONINI in the last 72 hours. BNP: No results for input(s): BNP in the last 72 hours. Lipids: No results for input(s): CHOL, HDL in the last 72 hours. Invalid input(s): LDLCALCU, TRIGLYCERIDE  ABGs:  No results for input(s): PHART, EZW3SCO, PO2ART, MML8UJT, BEART, THGBART, N1XTZYEU, CKP0FGM in the last 72 hours.     INR: Recent Labs     10/11/21  1848   INR 0.93     Lactate: No results for input(s): LACTATE in the last 72 hours. Cultures:  -----------------------------------------------------------------  RAD:   XR FOOT RIGHT (2 VIEWS)   Final Result      Expected postsurgical changes of right fourth ray and partial cuboid resection. XR FOOT LEFT (2 VIEWS)   Final Result      Expected postsurgical changes of left fifth ray resection. VL Extremity Venous Bilateral   Final Result      IR TUNNELED CVC PLACE WO SQ PORT/PUMP > 5 YEARS   Final Result   Impression:      Successful placement of a tunneled central venous catheter via the right internal jugular vein. The catheter is ready for immediate use. VL DUP LOWER EXTREMITY ARTERIES BILATERAL   Final Result      MRI FOOT LEFT WO CONTRAST   Final Result   Osteomyelitis involving the fifth metatarsal and fifth proximal phalanx with extensive osseous destruction. Mild osteomyelitis involving the lateral aspect of the cuboid. Prior partial first digit amputation. MRI FOOT RIGHT WO CONTRAST   Final Result   Multiple prior amputations. Soft tissue ulceration lateral to the cuboid with mild acute osteomyelitis involving the lateral base of the fourth metatarsal and more distal plantar aspect of the remaining fourth metatarsal shaft as well as the lateral aspect of the cuboid. XR FOOT LEFT (MIN 3 VIEWS)   Final Result      Numerous osteotomies with soft tissue swelling and possible ulcer lateral aspect of the right midfoot. No plain radiographic evidence for osteomyelitis, however plain film findings for osteomyelitis are often late findings. 3 views left foot      FINDINGS:      There is a mottled appearance of the phalanges of the right fifth digit as well as the fifth metatarsal. The remaining metatarsals unremarkable in appearance. Patient's had prior osteotomy of the first distal phalangeal tuft.       IMPRESSION:      Plain radiographic evidence for osteomyelitis involving the fifth phalanges and fifth metatarsal. Patient's bones are osteopenic. XR FOOT RIGHT (MIN 3 VIEWS)   Final Result      Numerous osteotomies with soft tissue swelling and possible ulcer lateral aspect of the right midfoot. No plain radiographic evidence for osteomyelitis, however plain film findings for osteomyelitis are often late findings. 3 views left foot      FINDINGS:      There is a mottled appearance of the phalanges of the right fifth digit as well as the fifth metatarsal. The remaining metatarsals unremarkable in appearance. Patient's had prior osteotomy of the first distal phalangeal tuft. IMPRESSION:      Plain radiographic evidence for osteomyelitis involving the fifth phalanges and fifth metatarsal. Patient's bones are osteopenic. XR CHEST PORTABLE   Final Result      Mild cardiomegaly and mild interstitial edema. Assessment/Plan:      Leslie Byrne. Pham Villanueva is a 59 yo F with PMH CKD4, DVT (on Eqliquis), T2DM c/b b/l lower extremity ulcers (follows with Podiatry outpatient, last visit 9/27/21), HTN, history of pain medication addiction (on Methadone 140 mg/day) who presented for progressively worsening b/l lower extremity pain for the 2 weeks. Osteomyelitis of b/l feet  Recently debrided outpatient on 9/27/21, do not appear acutely infected. Patient on Clinda and Cipro. XR of both feet show osteomyelitis involving 5th phalanges and fifth metatarsal. S/P bilateral lower extremity I&D with fifth ray resection left foot, fourth ray resection right foot, partial cuboid resection right foot.    - Plan for OR today for repeat I&D and possible delayed primary closure wit possible cuboid resection  - surgical path overall showed partially treated osteomyelitis  - Podiatry following; wound care per Podiatry  - Wound cultures grew pseudomonas, E coli and enterococcus  - AWill need IV vanc (until 11/26) and ertapenem(until 11/26) at discharge  - Patient is medically clear to go to OR today    FAHEEM on CKD 4 (improving)  Baseline creat is appears around 2. Hypoalbuminemia, Albumin 2.6 on presentation. Suspect from diabetic nephropathy in setting of poorly controlled diabetes. HbA1c 10/2021 at 8.3. Low BP may also be playing a part. - Kidney func stable  - Avoid nephrotoxic agents  - Nephrology following     Anemia  Likely 2/2 blood loss in OR in the setting of AOCD from CKD. Iron 16, iron sat 9, transferrin 7.8, TIBC 174. Requiring 4 uRBC transfusion so far. - CBC stable, H/H goal > 7    Chronic diastolic Heart Failure  Last Echo 2018 with EF 50-55%. Evidence of PAH which may be contributing. CXR 10/1 with mild cardiomegaly and interstitial edema. Suspect rt heart failure from undiagnosed sleep apnea. Echo with EF 55%, LVH and estimated pulm artery systolic pressure is at 60 mmHg   - Lasix 20 mg IV daily held for worsening FAHEEM  - strict I/O's, daily weights   - Low salt diet    Hyperkalemia   -Suspect 2/2 to blood transfusions. 5.7 today. - 5 units insulin and D50  - Continue to monitor daily RFP     Hypertension  - Hold parameters placed for low BP   - Hydralazine 50 mg TID  - Metoprolol 50 mg QD  - Hold cardura for now unless directed otherwise by nephro     Hyponatremia (resolved)  Na 133 on admission.  - continue to monitor      T2DM   BS 91 - recent outpatient visit records show good blood sugar control. HbA1c 6.8 on 06/17/21.   - HbA1c on  10/2021 at 8.3  - on wt loss, exercise, good diet and medication compliance  -lantus 12 units plus HDSS, hypoglycemia protocol     Hx of Opioid Pain Medication Use, Now on Methadone   - Continue Methadone 140 mg daily         Code Status: Full Code   FEN: Diet NPO Exceptions are: Sips of Water with Meds   PPX: heparin TID  DISPO: Bassam Gallo MD, PGY-1  10/12/21  6:56 AM    This patient has been staffed and discussed with Katelynn Hanna MD.

## 2021-10-12 NOTE — CARE COORDINATION
CM following for D/C planning:      CM met with pt at bedside and she  Has additional SNF  Request for referrals:    Pending - Request Sent     CM [address]           Referrals faxed  : CM will follow up : with  admission >     Electronically signed by Amaya Rinalid RN on 10/12/2021 at 9:48 AM     Amaya Rinaldi  RN Case Manager  [facility]  [address] [place]   [facility] [address]  [phone]  Fax [phone]

## 2021-10-12 NOTE — PROGRESS NOTES
Physical Therapy/Occupational therapy  HOLD    Orders received which state \"PT consult for after she gets back from the OR after getting delayed wound closure\". Spoke with RN, plan is for OR today. Will f/u 10/13.     Rio Jiménez, PT, DPT  [id number]  Adama Valdez, OTR/L #[id number]

## 2021-10-12 NOTE — PLAN OF CARE
Problem: Falls - Risk of:  Goal: Will remain free from falls  Description: Will remain free from falls  Outcome: Ongoing  Note: Discussed with pt importance to keep bed low and locked with alarm activated, nonskid socks on when out of bed, call light and belongings within reach- will monitor. Problem: Falls - Risk of:  Goal: Absence of physical injury  Description: Absence of physical injury  Outcome: Ongoing  Note: No physical injury noted. Will monitor. Problem: Pain:  Goal: Pain level will decrease  Description: Pain level will decrease  Outcome: Ongoing  Note: Pt denies pain, will monitor.      Problem: Skin Integrity:  Goal: Will show no infection signs and symptoms  Description: Will show no infection signs and symptoms  Outcome: Ongoing

## 2021-10-12 NOTE — PROGRESS NOTES
Podiatric Surgery Daily Progress Note  Segun Leon      Subjective :   Patient seen and examined this pm at the bedside. Patient denies any acute overnight events. Patient denies N/V/F/C/SOB. Patient denies calf pain, thigh pain, chest pain. Review of Systems: A 12 point review of symptoms is unremarkable with the exception of the chief complaint. Patient specifically denies nausea, fever, vomiting, chills, shortness of breath, chest pain, abdominal pain, constipation or difficulty urinating. Objective     BP (!) 164/79   Pulse 72   Temp 98.3 °F (36.8 °C) (Oral)   Resp 18   Ht 5' 2\" (1.575 m)   Wt 213 lb 13.5 oz (97 kg)   LMP 10/23/2015   SpO2 92%   BMI 39.11 kg/m²      I/O:    Intake/Output Summary (Last 24 hours) at 10/12/2021 1046  Last data filed at 10/12/2021 0813  Gross per 24 hour   Intake 480 ml   Output 1300 ml   Net -820 ml              Wt Readings from Last 3 Encounters:   10/12/21 213 lb 13.5 oz (97 kg)   08/12/21 200 lb (90.7 kg)   06/18/21 218 lb 0.6 oz (98.9 kg)       LABS:    Recent Labs     10/11/21  0521 10/12/21  0525   WBC 7.3 7.7   HGB 8.8* 9.1*   HCT 26.8* 27.7*    324        Recent Labs     10/12/21  0432      K 5.7*      CO2 27   BUN 40*   CREATININE 1.7*        Recent Labs     10/11/21  1848   INR 0.93           LOWER EXTREMITY EXAMINATION    Dressing to bilateral LE intact. No strikethrough noted to the external dressing. Moderate sanguinous drainage noted to the internal layers of the dressing. VASCULAR: DP and PT pulses nonpalpable bilateral.  Upon hand-held Doppler examination, DP and PT signals weakly biphasic bilateral. CFT is brisk to the distal aspect of the foot bilateral. Skin temperature is warm to cool from proximal to distal with no focal calor noted.  +2 pitting edema noted bilateral.  No pain with calf compression b/l.     NEUROLOGIC: Gross and epicritic sensation is diminished b/l.  Protective sensation is diminished at all pedal sites b/l. DERMATOLOGIC:   Right lower extremity:      Full-thickness ulceration noted to the plantar aspect beginning distally at the level of the previous fourth metatarsal head and extending proximally to the cuboid with cuboid bone exposed. Retention sutures intact. Surgifoam left intact to proximal aspect of wound bed. Wound base granular. No fluctuance, crepitus, active drainage, erythema, or malodor noted. No dehiscence noted to wound edges. Left lower extremity:      Full-thickness ulceration noted plantar lateral aspect beginning at the base of the fourth digit and extending proximally towards the base of the fourth metatarsal and laterally towards the third metatarsal with fourth metatarsal base and cuboid bone exposed.  Retention sutures intact. Surgifoam left intact to wound bed. Wound base granular. No fluctuance, crepitus, active drainage, erythema, or malodor noted. No dehiscence noted to wound edges. MUSCULOSKELETAL: Muscle strength is 4/5 for all pedal groups tested. No pain with palpation of the foot or ankle b/l. Ankle joint ROM is decreased in dorsiflexion with the knee extended.  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 S/p left fifth ray resection and right fourth ray and partial cuboid resection.     IMAGING:  X-ray right foot 10/7/2021-postop  Narrative   EXAM: XR FOOT RIGHT (2 VIEWS)       INDICATION:  s/p 4th ray resection and partial cuboid resection; packed open       COMPARISON: 10/3/2021, 10/2/2021       FINDINGS:       There are postsurgical changes of fourth ray resection and partial cuboid resection. Postsurgical changes of prior fifth ray resection and first, second, and third toe resections again seen.           Impression       Expected postsurgical changes of right fourth ray and partial cuboid resection.        X-ray left foot 10/7/2021-postop  Narrative   EXAM: XR FOOT LEFT (2 VIEWS)       INDICATION:  s/p 5th ray resection & cuboid bone biopsy; packed open       COMPARISON: 10/3/2021, 10/2/2021       FINDINGS:       There are postsurgical changes of fifth ray resection. There is a small amount of postsurgical subcutaneous gas. Redemonstration of prior amputation of the first digit at the level of the base of the first proximal phalanx again seen.           Impression       Expected postsurgical changes of left fifth ray resection. MRI right foot 10/3/2021  Narrative   EXAMINATION: Magnetic resonance imaging (MRI) of the right foot without contrast       HISTORY: Osteomyelitis 5th metatarsal base; rule out OM cuboid       TECHNIQUE: MRI of the right foot was performed using multiple pulse sequences in multiple planes without contrast.       COMPARISON: Radiograph dated 10/2/2021       FINDINGS:        There have been prior first second third MTP joint, fourth transmetatarsal, and fifth TMT joint amputations. There is a soft tissue ulcer lateral to the base of the fourth metatarsal with adjacent T1 hypointensity in marrow edema involving the base of    the fourth metatarsal as well as the lateral aspect of the cuboid representing osteomyelitis. There is also soft tissue swelling more distally along the plantar aspect of the remaining fourth metatarsal shafts with osteomyelitis along the plantar cortex    of the remaining fourth metatarsal. There is no acute fracture or dislocation. There is a degenerative subchondral cyst in the navicular. No abscess is seen within limits of noncontrast examination. Visualized tendons and plantar fascia are unremarkable.           Impression   Multiple prior amputations.    Soft tissue ulceration lateral to the cuboid with mild acute osteomyelitis involving the lateral base of the fourth metatarsal and more distal plantar aspect of the remaining fourth metatarsal shaft as well as the lateral aspect of the cuboid.        MRI left foot 10/3/2021  Narrative   EXAMINATION: Magnetic resonance imaging (MRI) of the left foot without contrast       HISTORY: Osteomyelitis 5th metatarsal       TECHNIQUE: MRI of the left foot was performed using multiple pulse sequences in multiple planes without contrast.       COMPARISON: Radiograph dated 10/2/2021       FINDINGS:        There has been prior amputation of the first digit at the level of the base of the first proximal phalanx. There is soft tissue ulceration lateral to the fifth mid metatarsal with surrounding edema and phlegmon in the subcutaneous tissues. There is    extensive destruction of the mid to distal aspect of the fifth metatarsal and base of the fifth proximal phalanx representing sequelae of osteomyelitis. There is also mild osteomyelitis extending to the base of the fifth metatarsal as well as along the    lateral aspect of the cuboid.           Impression   Osteomyelitis involving the fifth metatarsal and fifth proximal phalanx with extensive osseous destruction. Mild osteomyelitis involving the lateral aspect of the cuboid. Prior partial first digit amputation.      Lower extremity arterial duplex 10/4/2021-preliminary  Right   Right CRISTIAN was not available due to patient non-compliance . There are multiphasic waveforms in the common femoral artery indicating no   aortoiliac inflow disease. There is atherosclerotic plaque involving the superficial femoral and   popliteal arteries with no significantly elevated velocities. Left   Left CRISTIAN was not available due to patient non-compliance . There are multiphasic waveforms in the common femoral artery indicating no   aortoiliac inflow disease. There is atherosclerotic plaque involving the superficial femoral and   popliteal arteries with no significantly elevated velocities. The peroneal artery is not visualized.    There is no previous exam for comparison.        ASSESSMENT/PLAN  -S/p bilateral lower extremity I&D with left fifth ray resection, right fourth ray resection, right partial cuboid resection, was pushed back to tomorrow. DISPO: S/p bilateral lower extremity I&D with fifth ray resection left foot, fourth ray resection right foot, partial cuboid resection right foot. Patient will be brought back to the OR for delayed primary closure of the left vs wound vac application to the right lower extremity tomorrow. Continue antibiotics per ID recommendations.       Discussed assessment and plan with Dr. Fco Patel DPM.    Chas Grubbs DPM  10/12/21  10:46 AM

## 2021-10-12 NOTE — PROGRESS NOTES
Clinical Pharmacy Progress Note    Vancomycin - Management by Pharmacy    Consult Date(s): 10/2/21  Consulting Provider(s): Dr. Sean Coronel / Plan  1) Osteomyelitis of B/L feet - Vancomycin   Concurrent Antimicrobials:  Meropenem + Fluconazole   Day of Vanc Therapy: day #11   Current Dosing Method:  AUC  o Therapeutic Goal: -600mg/L*hr   Current Dose / Frequency / Plan / Rationale:   o Continue 750mg IV q24h. Random level 10/11= 23.6 (drawn ~12hr after prior dose) - with improvement in SCr today, Bayesian kinetics predict an AUC of 459 and steady state trough of 15.2.  Clinical condition will be monitored closely, and levels will be ordered as clinically indicated. Please call with questions--  Thanks--  Karin Sears, PharmD, BCPS, BCGP  [id number] ([facility])   10/12/2021 11:47 AM        Interval Update:  Planning for return to OR today for further I&D with possible further cuboid resection and possible DPC. Subjective/Objective:   Lachelle Duran is a 60 y.o. female with a PMHx significant for CKD4, DVT (2004) on apixaban, DM2, chronic B/L LE ulcers, HTN, and chronic pain on methadone who is admitted with B/L LE osteomyelitis. Pt underwent B/L LE I&D with left 5th ray resection, right 4th ray resection, and rt partial cuboid resection on 10/7. Pharmacy is consulted to dose vancomycin. Current antibiotics:  Fluconazole 200mg IV q24h (10/2-current)  Meropenem 1000mg IV q12h (10/2-current)  Vancomycin - Pharmacy to dose   Intermittent dosing (10/2-10/10)   750mg IV q24h (10/10-current)      Ht Readings from Last 1 Encounters:   10/01/21 5' 2\" (1.575 m)       Wt Readings from Last 1 Encounters:   10/12/21 213 lb 13.5 oz (97 kg)     Vancomycin Level(s) / Doses:    Date Time Dose Level / Type of Level Interpretation   10/2 1401 2250 mg IV x1     10/3 0712   Random = 24.5 mcg/mL Drawn ~19 hrs after previous dose given. Hold dose.    10/4 0829  Random =18.1 mcg/mL  Re-dose 500 mg IV x1    1231 500 mg IV x1     10/5 0307  Random = 18.8 mcg/mL Drawn 14.5 hrs after dose    1231 500 mg IV x1  Re-dose with 500 mg IV x 1   10/6 0849  Random = 18.6 mcg/mL Drawn 18h after dose. Will re-dose 500 mg IV x1    1511 500 mg IV x 1     10/7 0520  Random = 15.6 mcg/mL Switch to AUC-based dosing. Schedule vancomycin 750 mg IV Q24 hours and re-assess random 10/8.    1705 750 mg IV x 1     10/8 0454  Random = 18.6 mcg/mL Continue current regimen of 750 mg IV Q24h. Predicted to achieve AUC of 538 mg/L*hr.   10/8 15:25 750mg IV x1     10/9 14:18  Random = 18.6 mcg/mL Drawn ~24 hrs after previous dose. 10/9 16:40 500mg IV x1     10/10 16:58 750mg IV x1     10/11 05:21  Random = 23.6 mcg/mL Drawn ~12 hr after prior dose given, predicted  on 750mg q24h                   Cultures & Sensitivities:  Date Site Micro Susceptibility / Result   10/2 Foot tissue, right Pseudomonas aeruginosa     S: cefepime, gent, meropenem, Zosyn, tobramycin  R: Cipro     Foot tissue, right E. coli  S: Cefepime, ceftriaxone, cefuroxime, ertapenem, gent, meropenem, Zosyn, Bactrim  R: Cipro    Foot tissue, right Enterococcus faecalis S: Ampicillin, Vancomycin      Foot tissue, right Providencia stuartii    10/2 Foot wound, unclear R/L Pseudomonas aeruginosa Same as above    Foot wound, unclear R/L E.coli Same as above    Foot wound, unclear R/L Enterococcus faecalis Same as above   10/7 Tissue foot, right Pseudomonas aeruginosa S: cefepime, gent, tobramycin, meropenem, Zoysn  R: cipro     Labs / Ancillary Data:    Estimated Creatinine Clearance: 39 mL/min (A) (based on SCr of 1.7 mg/dL (H)).     Recent Labs     10/10/21  0534 10/11/21  0521 10/12/21  0432 10/12/21  0525   CREATININE 2.4* 2.0* 1.7*  --    BUN 46* 43* 40*  --    WBC 7.9 7.3  --  7.7

## 2021-10-12 NOTE — DISCHARGE INSTR - COC
THAN 3 YEARS 10/5/2021 [facility] SPECIAL PROCEDURES    KNEE SURGERY Left     from falling off ladder -- has screws in place pt report    OTHER SURGICAL HISTORY Left 05/25/2016    I & D left foot    OTHER SURGICAL HISTORY Right 10/20/2017    RIGHT GASTROC LENGTHENING ENDOSCOPIC, INJECTION OF AMNI GRAFT    OTHER SURGICAL HISTORY Right 04/26/2018    Diabetic foot ulcer I&D w/ integra graft application    VT DEBRIDEMENT, SKIN, SUB-Q TISSUE,MUSCLE,BONE,=<20 SQ CM Right 8/17/2018    RIGHT FOOT DEBRIDEMENT INCISION AND DRAINAGE, PARTIAL 5TH RAY AMPUTATION performed by Juliann Gibbons DPM at [address] [facility] / [address]  7/7003    cryotherapy done on lesion    TOE AMPUTATION Left 02/24/2017    AMPUTATION LEFT GREAT TOE                 TONSILLECTOMY         Immunization History:   Immunization History   Administered Date(s) Administered    Influenza 11/08/2011    Influenza Virus Vaccine 09/12/2014, 10/16/2015, 10/27/2017    Influenza, Quadv, 6 mo and older, IM, PF (Flulaval, Fluarix) 01/05/2019    Influenza, Quadv, IM, PF (6 mo and older Fluzone, Flulaval, Fluarix, and 3 yrs and older Afluria) 11/10/2016, 10/03/2019    Pneumococcal Polysaccharide ([id number]) 11/13/2015    Tdap (Boostrix, Adacel) 07/22/2014       Active Problems:  Patient Active Problem List   Diagnosis Code    Feet clawing Q66.89    Diabetic neuropathy ([facility] [address] E11.40    Hyperlipidemia E78.5    HTN (hypertension) I10    Amenorrhea N91.2    Dyspareunia [id number]    Neuropathy G62.9    Bacterial vaginosis N76.0, B96.89    Low back pain M54.50    Anomalies of nails Q84.6    Tobacco abuse Z72.0    Carpal tunnel syndrome G56.00    Scalp lesion L98.9    ETOH abuse F10.10    Pseudocyst, pancreas K86.3    Asthma J45.909    Nicotine addiction F17.200    Hypoxia R09.02    Onychomycosis B35.1    Depression F32. A    Anxiety state F41.1    Tinea pedis B35.3    Burn T30.0    Obesity (BMI 30-39. 9) E66.9    S/P foot surgery, right Z98.890    Open wound of left foot with complication O16.178U    Cellulitis L03.90    Bilateral lower extremity edema R60.0    Chronic multifocal osteomyelitis of left foot ([facility]) C87.631    Acute systolic congestive heart failure ([facility]) I50.21    Acute osteomyelitis of metatarsal bone of right foot ([facility]) M86.171    Bronchitis J40    Cellulitis and abscess of foot L03.119, L02.619    Group B streptococcal infection A49.1    Tendonitis, Achilles, right M76.61    Diabetic ulcer of right midfoot associated with type 2 diabetes mellitus, limited to breakdown of skin ([facility]) E11.621, L97.411    Opiate dependence ([facility]) F11.20    Class 2 obesity due to excess calories with body mass index (BMI) of 37.0 to 37.9 in adult E66.09, Z68.37    CKD (chronic kidney disease), stage III ([facility]) N18.30    Diabetic foot infection ([facility]) E11.628, L08.9    Chronic osteomyelitis of right foot with draining sinus ([facility]) M86.471    FAHEEM (acute kidney injury) ([facility]) N17.9    Acute blood loss anemia D62    Aspiration pneumonitis ([facility]) J69.0    Altered mental status R41.82    Chronic systolic heart failure ([facility]) I50.22    Gastroesophageal reflux disease K21.9    Type 2 diabetes mellitus with right diabetic foot infection ([facility]) E11.628, F58.6    Systolic CHF, acute ([facility]) I50.21    Type 2 diabetes mellitus with left diabetic foot infection ([facility]) E11.628, L08.9    Lymphedema of left leg I89.0    Lymphedema of right lower extremity I89.0    Charcot's joint of ankle M14.679    Elevated sed rate R70.0    Elevated C-reactive protein (CRP) R79.82    History of transmetatarsal amputation of right foot ([facility]) Z89.431    History of alcoholism ([facility]) F10.21    Ex-smoker Z87.891    History of MRSA infection Z86.14    CKD (chronic kidney disease) stage 4, GFR 15-29 ml/min ([facility]) N18.4       Isolation/Infection:   Isolation            No Isolation          Patient Infection Status       Infection Onset Added Last Indicated Last Indicated By Review Planned Expiration Resolved Resolved By    None active    Resolved    MRSA  08/02/11 08/02/11 Faye Mckinney RN   05/11/15 Cari Rocha RN    ca/colonization spt and amadoues            Nurse Assessment:  Last Vital Signs: BP (!) 164/79   Pulse 72   Temp 98.3 °F (36.8 °C) (Oral)   Resp 18   Ht 5' 2\" (1.575 m)   Wt 213 lb 13.5 oz (97 kg)   LMP 10/23/2015   SpO2 92%   BMI 39.11 kg/m²     Last documented pain score (0-10 scale): Pain Level: 2  Last Weight:   Wt Readings from Last 1 Encounters:   10/12/21 213 lb 13.5 oz (97 kg)     Mental Status:  oriented and alert    IV Access:  Right tunneled cath placed 10/5    Nursing Mobility/ADLs:  Walking   Dependent  Transfer  Dependent  Bathing  Assisted  Dressing  Assisted  Toileting  Assisted  Feeding  Assisted  Med Admin  Assisted  Med Delivery   whole    Wound Care Documentation and Therapy:  Wound 01/04/19 Foot Left;Plantar under great toe (Active)   Number of days: 1011       Wound 06/17/21 (Active)   Number of days: 116        Elimination:  Continence:   · Bowel: Yes  · Bladder: Yes  Urinary Catheter: Insertion Date: 10/14   Colostomy/Ileostomy/Ileal Conduit: No       Date of Last BM: 10/15    Intake/Output Summary (Last 24 hours) at 10/12/2021 1041  Last data filed at 10/12/2021 0813  Gross per 24 hour   Intake 480 ml   Output 1300 ml   Net -820 ml     I/O last 3 completed shifts: In: 5 [P.O.:840]  Out: -     Safety Concerns: At Risk for Falls    Impairments/Disabilities:      None    Nutrition Therapy:  Current Nutrition Therapy:   - Oral Diet:  Carb Control 3 carbs/meal (1500kcals/day) and Low Sodium (2gm), low potassium    Routes of Feeding: Oral  Liquids: Thin Liquids  Daily Fluid Restriction: no  Last Modified Barium Swallow with Video (Video Swallowing Test): not done    Treatments at the Time of Hospital Discharge:   Respiratory Treatments: RA  Oxygen Therapy:  is not on home oxygen therapy.   Ventilator:    - No ventilator support      Heart Failure Instructions for Daily Management  Patient was treated for chronic diastolic heart failure. she  will require the following:     Please weigh daily on the same scale and approximately the same time of day. Report weight gain of 3 pounds/day or 5 pounds/week to : alec GOMEZ and Tyson Cottrell -[phone].  Please use hospital discharge weight as baseline reference.  Please monitor for signs and symptoms of and report to MD:  o Worsening Heart Failure: sudden weight gain, shortness of breath, lower extremity or general edema/swelling, abdominal bloating/swelling, inability to lie flat, intolerance to usual activity, or cough (especially at night). Report these finding even if no increase in weight.  o Dehydration:  having difficulty or a decrease in urination, dizziness, worsening fatigue, or new onset/worsening of generalized weakness.  Please continue a LOW SODIUM diet and LIMIT fluid intake to 48 - 64 ounces ( 1.5 - 2 liters) per day.  Call Tyson Cottrell [phone] and alec GOMEZ with any questions or concerns.  Please continue heart failure education to patient and family/support system.  See After Visit Summary for hospital follow up appointment details.  Consider spiritual care referral for support and/or completion of advance directives .  Consider: having the [facility] MD complete required 7 day follow up, [place] [address] telehealth program if patient agreeable and able to participate and palliative care consult for ongoing goals of care, end of life, and/or chronic disease management discussions. Rehab Therapies: Physical Therapy and Occupational Therapy  Weight Bearing Status/Restrictions: Non-weight bearing on right leg  Other Medical Equipment (for information only, NOT a DME order):   Wound vac  Other Treatments: NA    Patient's personal belongings (please select all that are sent with patient):  {P DME Belongings:[id number]:::0}    RN SIGNATURE:  {Esignature:[id number]:::0}    CASE MANAGEMENT/SOCIAL WORK SECTION    Inpatient Status Date: 10/01/2021    Readmission Risk Assessment Score:  Readmission Risk              Risk of Unplanned Readmission:  34           Discharging to Facility/ Agency   · Name: [facility]  · Address:  · Phone:report- [phone]  · GZW:[phone]    Dialysis Facility (if applicable)   · Name:  · Address:  · Dialysis Schedule:  · Phone:  · Fax:    / signature: Electronically signed by Michael Cade RN on 10/16/21 at 2:41 PM EDT    PHYSICIAN SECTION    Prognosis: Good    Condition at Discharge: Stable    Rehab Potential (if transferring to Rehab):  Fair    Recommended Labs or Other Treatments After Discharge:   Patient is on Methadone for chronic back pain x 20 yrs  For now we will give prescription for oxycodone 5 mg and need coordination with methadone clinic  I do recommend to drop dose of Methadone from 140 mg to 120 mg or lower    INFUSION ORDERS: modified 10/16  Vancomycin 750 mg iv daily through 11/26  Meropenem 1 gm iv q 12 through 11/26  - Diagnosis - DM foot osteomyelitis   - First dose given in hospital  - PICC   - Disposition / date discharge  - Check CBC w diff, CMP, ESR, CRP, CK every Mon or Tue - FAX result to [phone]  - Call with antibiotic / infusion issues, [phone]  - Call with any change in status, transfer in or out of a facility or to hospital - [phone]  - No f/u in outpatient ID office necessary    Podiatry Wound Care Discharge Instructions  RIGHT LOWER EXTREMITY  Wound VAC dressing change instructions  -Please perform wound VAC dressing change to the right foot every Monday, Wednesday, Friday  -Apply adaptic or other nonadherent dressing  Over the incision site with sutures on the outside of the right foot  -Using adhesive sheet from wound Vac kit, cut to size that will be placed over the wound and surrounding soft tissue to \"window out\" the wound. You may apply the adhesive sheet over the adaptic overlying the incision site  -Next, using a scissors cut the adhesive sheet out that is overlying the wound bed  -Next, using the white foam cut to a wang size and place into the wound overlying the exposed bone   -Next, using the black foam from the wound VAC kit, cut to size the black foam to fit over the wound bed  -Next, using the adhesive sheet, place the black foam over the wound bed and then place the adhesive sheet over the black foam to hold in place  -Next, using a scissors cut a quarter size hole in the adhesive sheet/black foam for the wound VAC suction connector to be placed over  -Next, place the wound VAC suction connector over the cut out area on the black foam  -Next, hook up the connector to the other portion of the wound VAC canister  -Set wound VAC to VAC therapy and running continuously for 125 mmHg   -Ensure that a good seal is noted. If needed re-enforce areas with additional adhesive sheet stripes from the wound VAC kit  -Next, dress the area with gauze over the wound VAC area and place gauze along the top of the foot and ankle  -Next, using kerlix wrap from the toes up and just above the ankle  -Next, using Ace bandage, wrap from the toes up and just above the ankle with CARE to ensure wound VAC tubing is NOT in direct contact with the skin    LEFT LOWER EXTREMITY  Please leave dressing to the left lower extremity clean, dry, intact. Patient is non weightbearing Bilateral lower extremity  Patient will follow up with Dr. Collette Francis DPM at the [address] [place] [address] on Monday 10/18/21. Patient needs to follow up in the Podiatry clinic within 1 week of discharge. Physician Certification: I certify the above information and transfer of Catie Blanco  is necessary for the continuing treatment of the diagnosis listed and that she requires [place] for less 30 days. Update Admission H&P: No change in H&P    PHYSICIAN SIGNATURE:  Electronically signed by Nura Shaffer MD on 10/16/21 at 10:41 AM EDT

## 2021-10-12 NOTE — PROGRESS NOTES
Office : [phone]     Fax :[phone]       Nephrology progress  Note      Patient's Name: Chitra Mccormick    Reason for Consult:  FAHEEM on CKD 4       Requesting Physician:  Susan Fleming MD      Chief Complaint:    Chief Complaint   Patient presents with    Leg Pain     x 2 weeks. History of Present iIlness:    Chitra Mccormick is a 60 y.o. female with past medical h/o CKD4, DVT, T2DM c/b b/l lower extremity ulcers  who presented for progressively worsening b/l lower extremity pain for the 2 weeks. Patient states she presented today with pain in legs so severe that it is now inhibiting her ability to ambulate. She endorses that the legs are also more swollen and warm to touch with pain when she touches it. She endorses she tries to eat a healthy diet low in salt, she also denies any major weight fluctuations recently.     Baseline creat is 2/0   Now elevated at 2.4        Interval hx :    Cr better   BP better now   No N/V/D     K elevated         I/O last 3 completed shifts:   In: 5493 [P.O.:1780]  Out: 56 [Urine:1300]    Past Medical History:   Diagnosis Date    Asthma 05/14/2004    Bacterial vaginosis 04/2008    Carpal tunnel syndrome 05/2007    COPD (chronic obstructive pulmonary disease) ([address]     Diabetes mellitus type II 08/2007    10/1/20 pt states does accucheck 2x/day at home    Diabetic neuropathy ([address] 20/2900    Diastolic CHF ([address]     DVT (deep venous thrombosis) ([address] 03/2004    Dyslipidemia 05/2009    Dyspareunia 05/2009    ETOH abuse 03/04/2007    Feet clawing     HTN (hypertension)     Hx of blood clots     Hyperlipidemia     MRSA (methicillin resistant staph aureus) culture positive 11/06/2017; 11/17/2017    foot; leg     Neuropathy 05/2009 polyneuropathy    Pancreatitis 2004    Scalp lesion 2007    Tobacco abuse 2008    Uses walker     Uses wheelchair     also uses walker    Wears dentures        Past Surgical History:   Procedure Laterality Date     SECTION  unknown    FOOT DEBRIDEMENT Right 2019    INCISION AND DRAINAGE WITH APPLICATION OF STRAVIX GRAFT RIGHT FOOT performed by Autumn Rodriguez DPM at [address] Right 2019    RIGHT FOOT INCISION AND DRAINAGE WITH STAGING TRANSMETATARSAL AMPUTATION performed by Autumn Rodriguez DPM at [address] Right 2019    RIGHT FOOT DEBRIDEMENT INCISION AND DRAINAGE, OPEN DIABETIC FOOT ULCER WITH GRAFT PLACEMENT performed by Autumn Rodriguez DPM at [facility] Left 10/23/2019    LEFT FOOT INCISION AND DRAINAGE , DEBRIDEMENT OF OPEN WOUND, APPLICATION OF STRAVIX GRAFT performed by Autumn Rodriguez DPM at [address] Right 3/29/2021    INCISION AND DRAINAGE, DEBRIDEMENT OF DIABETIC WOUND WITH PLACEMENT OF STRAVIX GRAFT RIGHT FOOT performed by Autumn Rodriguez DPM at [address]  10/7/2021    BILATERAL LOWER EXTREMITY INCISION AND DRAINAGE, RIGHT FOOT 4TH RAY RESECTION, LEFT FOOT 5TH RAY RESECTION performed by Autumn Rodriguez DPM at [address] IR TUNNELED [address] 5 YEARS  10/5/2021    IR TUNNELED CATHETER PLACEMENT GREATER THAN 5 YEARS 10/5/2021 [address] SPECIAL PROCEDURES    KNEE SURGERY Left     from falling off ladder -- has screws in place pt report    OTHER SURGICAL HISTORY Left 2016    I & D left foot    OTHER SURGICAL HISTORY Right 10/20/2017    RIGHT GASTROC LENGTHENING ENDOSCOPIC, INJECTION OF AMNI GRAFT    OTHER SURGICAL HISTORY Right 2018    Diabetic foot ulcer I&D w/ integra graft application    DC DEBRIDEMENT, SKIN, SUB-Q TISSUE,MUSCLE,BONE,=<20 SQ CM Right 2018    RIGHT FOOT DEBRIDEMENT INCISION AND DRAINAGE, PARTIAL 5TH RAY AMPUTATION performed by Tana Hampton DPM at [address] [facility] / [address]  7/7003    cryotherapy done on lesion    TOE AMPUTATION Left 02/24/2017    AMPUTATION LEFT GREAT TOE                 TONSILLECTOMY         History reviewed. No pertinent family history. reports that she quit smoking about 3 years ago. Her smoking use included cigarettes. She has a 30.00 pack-year smoking history. She has never used smokeless tobacco. She reports that she does not drink alcohol and does not use drugs.         Allergies:  Sulfa antibiotics    Current Medications:    heparin (porcine) injection 5,000 Units, 3 times per day  vancomycin (VANCOCIN) 750 mg in dextrose 5 % 250 mL IVPB, Q24H  metoprolol succinate (TOPROL XL) extended release tablet 50 mg, Daily  hydrALAZINE (APRESOLINE) tablet 50 mg, 3 times per day  hydrALAZINE (APRESOLINE) injection 5 mg, Q6H PRN  0.9 % sodium chloride infusion, PRN  lidocaine PF 1 % injection 5 mL, Once  sodium chloride flush 0.9 % injection 5-40 mL, 2 times per day  sodium chloride flush 0.9 % injection 5-40 mL, PRN  0.9 % sodium chloride infusion, PRN  labetalol (NORMODYNE;TRANDATE) injection syringe 10 mg, Q4H PRN  0.9 % sodium chloride infusion, PRN  meropenem (MERREM) 1,000 mg in sodium chloride 0.9 % 100 mL IVPB (mini-bag), Q12H  glucose (GLUTOSE) 40 % oral gel 15 g, PRN  dextrose 50 % IV solution, PRN  glucagon (rDNA) injection 1 mg, PRN  dextrose 5 % solution, PRN  insulin glargine (LANTUS;BASAGLAR) injection pen 12 Units, Nightly  insulin lispro (1 Unit Dial) 0-18 Units, TID WC  insulin lispro (1 Unit Dial) 0-9 Units, Nightly  fluconazole (DIFLUCAN) 200 mg IVPB, Q24H  albuterol (PROVENTIL) nebulizer solution 2.5 mg, Q6H PRN  amitriptyline (ELAVIL) tablet 100 mg, Nightly  aspirin EC tablet 81 mg, Daily  atorvastatin (LIPITOR) tablet 20 mg, Nightly  escitalopram (LEXAPRO) tablet 10 mg, Daily  gabapentin (NEURONTIN) capsule 400 mg, BID  methadone (DOLOPHINE) tablet 140 mg, Daily  pantoprazole (PROTONIX) tablet 40 mg, Daily  sodium chloride flush 0.9 % injection 5-40 mL, PRN  0.9 % sodium chloride infusion, PRN  ondansetron (ZOFRAN-ODT) disintegrating tablet 4 mg, Q8H PRN   Or  ondansetron (ZOFRAN) injection 4 mg, Q6H PRN  polyethylene glycol (GLYCOLAX) packet 17 g, Daily PRN  acetaminophen (TYLENOL) tablet 650 mg, Q6H PRN   Or  acetaminophen (TYLENOL) suppository 650 mg, Q6H PRN  sodium chloride flush 0.9 % injection 5-40 mL, 2 times per day  influenza quadrivalent split vaccine (FLUZONE;FLUARIX;FLULAVAL;AFLURIA) injection 0.5 mL, Prior to discharge        Review of Systems:   14 point ROS obtained but were negative except mentioned in HPI      Physical exam:     Vitals:  /74   Pulse 73   Temp 98.4 °F (36.9 °C) (Oral)   Resp 16   Ht 5' 2\" (1.575 m)   Wt 214 lb 11.7 oz (97.4 kg)   LMP 10/23/2015   SpO2 96%   BMI 39.27 kg/m²   Constitutional:  OAA X3 NAD  Skin: no rash, turgor wnl  Heent:  eomi, mmm  Neck: no bruits or jvd noted  Cardiovascular:  S1, S2 without m/r/g  Respiratory: b/L base crackles   Abdomen:  +bs, soft, nt, nd  Ext: +  lower extremity edema  Psychiatric: mood and affect appropriate  Musculoskeletal:  Rom, muscular strength intact    Labs:  CBC:   Recent Labs     10/09/21  0510 10/09/21  1645 10/10/21  0534 10/11/21  0134 10/11/21  0521   WBC 9.1  --  7.9  --  7.3   HGB 8.8*   < > 8.9* 8.8* 8.8*     --  301  --  296    < > = values in this interval not displayed. BMP:    Recent Labs     10/09/21  0510 10/10/21  0534 10/11/21  0521   * 137 139   K 4.8 5.5* 5.3*    105 107   CO2 26 25 26   BUN 44* 46* 43*   CREATININE 2.8* 2.4* 2.0*   GLUCOSE 109* 168* 78     Ca/Mg/Phos:   Recent Labs     10/09/21  0510 10/10/21  0534 10/11/21  0521   CALCIUM 8.6 8.5 8.6   MG 1.70* 2.20 2.00     Hepatic:   No results for input(s): AST, ALT, ALB, BILITOT, ALKPHOS in the last 72 hours.   Troponin:   No results for input(s): TROPONINI in the last 72 hours.  BNP: No results for input(s): BNP in the last 72 hours. Lipids: No results for input(s): CHOL, TRIG, HDL, LDLCALC, LABVLDL in the last 72 hours. ABGs: No results for input(s): PHART, PO2ART, OHQ1GUR in the last 72 hours. INR:   Recent Labs     10/11/21  1848   INR 0.93     UA:  No results for input(s): Shermon Honer, GLUCOSEU, BILIRUBINUR, KETUA, SPECGRAV, BLOODU, PHUR, PROTEINU, UROBILINOGEN, NITRU, LEUKOCYTESUR, Gurwinder Gianotti in the last 72 hours. Urine Microscopic:   No results for input(s): LABCAST, BACTERIA, COMU, HYALCAST, WBCUA, RBCUA, EPIU in the last 72 hours. Urine Culture: No results for input(s): LABURIN in the last 72 hours. Urine Chemistry:   No results for input(s): Rivera Miracle, PROTEINUR, NAUR in the last 72 hours. IMAGING:  XR FOOT RIGHT (2 VIEWS)   Final Result      Expected postsurgical changes of right fourth ray and partial cuboid resection. XR FOOT LEFT (2 VIEWS)   Final Result      Expected postsurgical changes of left fifth ray resection. VL Extremity Venous Bilateral   Final Result      IR TUNNELED CVC PLACE WO SQ PORT/PUMP > 5 YEARS   Final Result   Impression:      Successful placement of a tunneled central venous catheter via the right internal jugular vein. The catheter is ready for immediate use. VL DUP LOWER EXTREMITY ARTERIES BILATERAL   Final Result      MRI FOOT LEFT WO CONTRAST   Final Result   Osteomyelitis involving the fifth metatarsal and fifth proximal phalanx with extensive osseous destruction. Mild osteomyelitis involving the lateral aspect of the cuboid. Prior partial first digit amputation. MRI FOOT RIGHT WO CONTRAST   Final Result   Multiple prior amputations. Soft tissue ulceration lateral to the cuboid with mild acute osteomyelitis involving the lateral base of the fourth metatarsal and more distal plantar aspect of the remaining fourth metatarsal shaft as well as the lateral aspect of the cuboid.       XR FOOT LEFT Daily weights   AVOID NSAIDs  Avoid Nephrotoxins  Monitor Intake/Output  Call if significant decrease in urine output           Thank you for allowing us to participate in care of Ilsa Valdez         Electronically signed by: Kevon Vela MD, 10/11/2021, 8:47 PM      [facility] associates of [address] [place] [address] S  Office : [phone]  Fax :[phone]

## 2021-10-12 NOTE — PROGRESS NOTES
ID Follow-up NOTE    CC:   Diabetic foot ulcer / infection  Antibiotics: Vancomycin, Meropenem    Admit Date: 10/1/2021  Hospital Day: 12    Subjective:     Plan to go to OR later today    POD#5 --  R 4th ray, partial cuboid resection  L 5th ray resection    Patient reports mild foot pain at present, R > L      Objective:     Patient Vitals for the past 8 hrs:   BP Temp Temp src Pulse Resp SpO2 Weight   10/12/21 0813 (!) 164/79 98.3 °F (36.8 °C) Oral 72 18 92 % 213 lb 13.5 oz (97 kg)   10/12/21 0409 (!) 152/80 97.8 °F (36.6 °C) Oral 67 18 94 %      I/O last 3 completed shifts: In: 840 [P.O.:840]  Out: -   I/O this shift:  In: -   Out: 1300 [Urine:1300]    EXAM:  GENERAL: No apparent distress.   RA  HEENT: Membranes moist, no oral lesion  NECK:  Supple, no lymphadenopathy  LUNGS: Clear b/l, no rales, no dullness  CARDIAC: RRR, no murmur appreciated  ABD:  Obese, + BS, soft / NT  EXT:  LE venous stasis, b/l foot dressing / ACE in place  NEURO: No focal neurologic findings  PSYCH: Orientation, sensorium, mood normal  LINES:  Peripheral iv       Data Review:  Lab Results   Component Value Date    WBC 7.7 10/12/2021    HGB 9.1 (L) 10/12/2021    HCT 27.7 (L) 10/12/2021    MCV 85.6 10/12/2021     10/12/2021     Lab Results   Component Value Date    CREATININE 1.7 (H) 10/12/2021    BUN 40 (H) 10/12/2021     10/12/2021    K 5.7 (H) 10/12/2021     10/12/2021    CO2 27 10/12/2021       Hepatic Function Panel:   Lab Results   Component Value Date    ALKPHOS 131 10/01/2021    ALT 13 10/01/2021    AST 16 10/01/2021    PROT 6.9 10/01/2021    PROT 6.6 07/21/2011    BILITOT <0.2 10/01/2021    BILIDIR <0.2 10/01/2021    IBILI see below 10/01/2021    LABALBU 2.6 10/01/2021       MICRO:  10/7 Surg cult: Gs 1+WBC, no organism; cult rare Ps aeruginosa, rare mixed skin sukhjinder  Antibiotic Interpretation ISAAC   cefepime Sensitive 8 mcg/mL   ciprofloxacin Resistant >2 mcg/mL   gentamicin Sensitive <=4 mcg/mL   meropenem Sensitive <=1 mcg/mL   piperacillin-tazobactam Sensitive <=16 mcg/mL   tobramycin Sensitive <=4 mcg/mL     10/2 Wound (not know if L or R): light Ps aeruginosa (R cipro), light 2nd E coli, light Enterococcus faecalis  Pseudomonas aeruginosa   Antibiotic Interpretation ISAAC   cefepime Sensitive 8 mcg/mL   ciprofloxacin Resistant >2 mcg/mL   gentamicin Sensitive <=4 mcg/mL   meropenem Sensitive <=1 mcg/mL   piperacillin-tazobactam Sensitive <=16 mcg/mL   tobramycin Sensitive <=4 mcg/mL     Escherichia coli   Antibiotic Interpretation ISAAC   amoxicillin-clavulanate Intermediate 16/8 mcg/mL   ampicillin Resistant >16 mcg/mL   ceFAZolin Resistant >16 mcg/mL   cefepime Sensitive <=2 mcg/mL   cefTRIAXone Sensitive <=1 mcg/mL   cefuroxime Sensitive 8 mcg/mL   ciprofloxacin Resistant >2 mcg/mL   ertapenem Sensitive <=0.5 mcg/mL   gentamicin Sensitive <=4 mcg/mL   meropenem Sensitive <=1 mcg/mL   piperacillin-tazobactam Sensitive <=16 mcg/mL   trimethoprim-sulfamethoxazole Sensitive <=2/38 mcg/mL     Enterococcus  faecalis   Antibiotic Interpretation ISAAC   ampicillin Sensitive <=2 mcg/mL   vancomycin Sensitive 2 mcg/mL       IMAGING:  10/3 L foot MRI  Impression   Osteomyelitis involving the fifth metatarsal and fifth proximal phalanx with extensive osseous destruction. Mild osteomyelitis involving the lateral aspect of the cuboid. Prior partial first digit amputation     10/3 R foot MRI   Impression   Multiple prior amputations. Soft tissue ulceration lateral to the cuboid with mild acute osteomyelitis involving the lateral base of the fourth metatarsal and more distal plantar aspect of the remaining fourth metatarsal shaft as well as the lateral aspect of the cuboid.        Scheduled Meds:   [Held by provider] heparin (porcine)  5,000 Units SubCUTAneous 3 times per day    vancomycin  750 mg IntraVENous Q24H    metoprolol succinate  50 mg Oral Daily    hydrALAZINE  50 mg Oral 3 times per day    lidocaine 1 % injection 5 mL IntraDERmal Once    sodium chloride flush  5-40 mL IntraVENous 2 times per day    meropenem  1,000 mg IntraVENous Q12H    insulin glargine  12 Units SubCUTAneous Nightly    insulin lispro  0-18 Units SubCUTAneous TID WC    insulin lispro  0-9 Units SubCUTAneous Nightly    fluconazole  200 mg IntraVENous Q24H    amitriptyline  100 mg Oral Nightly    aspirin EC  81 mg Oral Daily    atorvastatin  20 mg Oral Nightly    escitalopram  10 mg Oral Daily    gabapentin  400 mg Oral BID    methadone  140 mg Oral Daily    pantoprazole  40 mg Oral Daily    sodium chloride flush  5-40 mL IntraVENous 2 times per day    influenza virus vaccine  0.5 mL IntraMUSCular Prior to discharge       Continuous Infusions:   dextrose      sodium chloride      sodium chloride 25 mL (10/10/21 1449)    sodium chloride      dextrose      sodium chloride 25 mL (10/11/21 1649)       PRN Meds:  dextrose, dextrose, hydrALAZINE, sodium chloride, sodium chloride flush, sodium chloride, labetalol, sodium chloride, glucose, dextrose, glucagon (rDNA), dextrose, albuterol, sodium chloride flush, sodium chloride, ondansetron **OR** ondansetron, polyethylene glycol, acetaminophen **OR** acetaminophen      Assessment:     Obesity (BMI 37), DM, neuropathy, HTN, HL, CKD, chronic pain  Hx DM foot infection / surgeries - has R TMA, L hallux resection    L foot wound infection / osteomyelitis   - MRI with 5th MT + prox 5th phalanx destruction, lateral cuboid edema, reviewed images with Radiologist 10/4   - OR 10/7 - 5th ray resection; path - 'focal subacute, partially treated osteomyelitis' (L 5th MT)    R foot wound infection / osteomyelitis    - MRI with cuboid, 4th MT OM, reviewed images with Radiologist 10/4   - OR 10/7 - R 4th ray, partial cuboid resection - NOT definitive   - Path with 4th MT acute osteomyelitis, R cuboid 'focal acute osteomyelitis', R cuboid clearance frag 'rare focus of acute osteomyeltiis'    Cult polymicrobial with Ps aerug (R cipro), E coli, E faecalis    FAHEEM, Cr down further today (1.7)    Plan:     Cont vancomycin, meropenem for now     Return to OR today 10/12    - L I&D and closure, R I&D with further cuboid resection    See EBONY   - will need prolonged course iv antibiotics after discharge (vanco / ertapenem)    Medical Decision Making:   The following items were considered in medical decision making:  Discussion of patient care with other providers  Reviewed clinical lab tests  Reviewed radiology tests  Reviewed other diagnostic tests/interventions  Independent review of radiologic images - reviewed MRI with Radiologist 10/4  Microbiology cultures and other micro tests reviewed      Discussed with pt, Podiatry Resident  Louise Perez MD    INFUSION ORDERS:  Vancomycin 750 mg iv daily through 11/26  Invanz 1 gm iv daily through 11/26  - Diagnosis - DM foot osteomyelitis   - First dose given in hospital  - PICC   - Disposition / date discharge  - Check CBC w diff, CMP, ESR, CRP, CK every Mon or Tue - FAX result to [phone]  - Call with antibiotic / infusion issues, [phone]  - Call with any change in status, transfer in or out of a facility or to hospital - [phone]  - No f/u in outpatient ID office necessary

## 2021-10-13 NOTE — PROGRESS NOTES
PACU Transfer to Floor Note    Current Allergies: Sulfa antibiotics    Pt meets criteria as per Tara Score and ASPAN Standards to transfer to next phase of care. Recent Labs     10/13/21  1034 10/13/21  1308   POCGLU 101* 173*       Vitals:    10/13/21 1800   BP: 130/77   Pulse: 70   Resp: 17   Temp: 97 °F (36.1 °C)   SpO2: 94%     Vitals within 20% of pt's admission vitals as per TARA SCORE    SpO2: 94 %    O2 Flow Rate (L/min): 3 L/min      Intake/Output Summary (Last 24 hours) at 10/13/2021 1839  Last data filed at 10/13/2021 1249  Gross per 24 hour   Intake 700 ml   Output 300 ml   Net 400 ml       Pain assessment:  none    Pain Level-Would not rate-stated no foot pain  Patient was assessed for alterations to skin integrity. There were not alterations observed. Is patient incontinent: no    Handoff report given at bedside.    Daughter Shanta Reyes called and updated x 2      10/13/2021 6:39 PM

## 2021-10-13 NOTE — PROGRESS NOTES
Physical Therapy/Occupational therapy  HOLD    Orders received which state \"PT consult for after she gets back from the OR after getting delayed wound closure\". Per chart, plan is for OR today. Will f/u 10/14. Will discuss with RN.     Calvin Dsouza, PT, DPT  [phone]     Coral Sella OTR/L

## 2021-10-13 NOTE — FLOWSHEET NOTE
10/12/21 1437   Encounter Summary   Services provided to: Patient   Referral/Consult From: Rounding   Continue Visiting   (10/12/21, LEONEL )   Complexity of Encounter Moderate   Length of Encounter 15 minutes   Routine   Type Initial   Assessment Calm; Approachable   Intervention Nurtured hope   Outcome Expressed gratitude

## 2021-10-13 NOTE — CARE COORDINATION
SW Following, Pt from home w/Family, Pt plans to DC to SNF and has referrals pending, Pt went to the OR today for an I&D, Pt is NWB to [facility], Pt remains on IV ABX and has PT/OT evals and an ID conso pending. SW will continue to follow for DC needs/recs. [address] DENIED    2. [facility]: [address]  and LVM regarding referral    3. [facility]ill[facility] DENIED    4.  Yahir Yeung: SW submitted referral based on proximity to Pt's home, SW called Pal[facility] Whyte [phone] regarding the Pt's referral and faxed her the Pt's clinicals to [phone] ACCEPTED    Electronically signed by SAURABH Tamez, LSW on 10/13/2021 at 1:33 PM   [phone]

## 2021-10-13 NOTE — OP NOTE
Operative Note      Patient: Goldy Romero  YOB: 1965  MRN: [id number]     Date of Procedure: 10/13/2021     Pre-Op Diagnosis: OSTEOMYELITIS 4TH RIGHT TOE & 5TH LEFT TOE     Post-Op Diagnosis: Same       Procedures:  Left lower extremity: Incision and Drainage with delayed  Primary closure  Right lower extremity: Incision and drainage, partial cuboid resection, wound vac application     Surgeon(s):  Jason Bronson DPM      Assistant(s):        Treasure Ochoa PGY-2  Ashok Mae PGY-1     Anesthesia:   General     Injectables:  pre-op 20 cc of 2% lidocaine plain and post-op 20 cc of 0.5% marcaine plain and 6 cc of amniflow     Hemostasis:   anatomic dissection and electrocautery     Materials: 2-0 vicryl, 3-0 nylon, white foam, black foam, wound vac to the right lower extremity     Estimated Blood Loss: 302      Complications: None    Specimens:   ID Type Source Tests Collected by Time Destination   1 : CUBOID RIGHT FOOT Bone Bone CULTURE, FUNGUS, CULTURE, SURGICAL, CULTURE WITH SMEAR, ACID FAST BACILLIUS Jason Bronson DPM 10/13/2021 1153    A : CUBOID RIGHT FOOT  Tissue Tissue SURGICAL PATHOLOGY Jason Bronson DPM 10/13/2021 1151        Implants:  Implant Name Type Inv.  Item Serial No.  Lot No. LRB No. Used Action   ALLOGRAFT HUM TISS 1 CC AMNIO TISS [employer] AMNIFLO CRYOPRES  ALL[facility]RAFT [facility]UM TISS [facility]  BONE BANK ALLOGRAFTS-WD [de-identified] DN# [id number]  1 Implanted   ALLOGRAFT HUM TISS 1 CC AMNIO TISS [employer] AMNIFLO CRYOPRES  ALLOGRAFT HUM TISS 1 CC AMNIO TISS [facility] AMNIFLO CRYOPRES  BONE BANK ALLOGRAFT[employer]-WD [de-identified] DN# [id number]  1 Implanted   ALLOGRAFT HUM TISS 1 CC AMNIO TISS [employer] AMNIFLO CRYOPRES  ALLOGRAFT HUM TISS 1 CC AMNIO TISS [facility] AMNIFLO CRYOPRES  BONE BANK ALLOGRAFT[employer]-WD [de-identified] DN# [id number]  1 Implanted         Drains:   Negative Pressure Wound Therapy Foot Right (Active)   Wound Type Surgical 10/13/21 1800   Dressing Type Black foam 10/13/21 1800   Target Pressure (mmHg) 150 10/13/21 1800       Findings: No purulent drainage encountered to bilateral lower extremities. Left necrotic tissue removed and debulked; cuboid white, hard, and healthy. Right necrotic tissue noted and removed; cuboid noted to be hard;with wound vac placed to the right lower extremity    INDICATIONS FOR PROCEDURE: This patient has signs and symptoms clinically and radiographically consistent with the above mentioned preoperative diagnosis. Having failed conservative treatment, it was determined that the patient would benefit from surgical intervention. All potential risks, benefits, and complications were discussed with the patient prior to the scheduling of surgery. All the patient's questions were answered and no guarantees were given. The patient wished to proceed with surgery, and informed written consent was obtained. DETAILS OF PROCEDURE: The patient was brought from the pre-operative area and placed on the operating table in the supine position. Following IV sedation, a local anesthetic block was then injected proximal to the incision site consisting of 10 cc of 2% lidocaine plain to the right lower extremity and 10 cc of 2% lidocaine plain to the left lower extremity. The bilateral lower extremities was then scrubbed, prepped, and draped in the usual sterile fashion. A time-out was performed. The patient, procedure, and operative site were confirmed. Procedure #1 incision and drainage of left lower extremity: Attention was then turned to the lateral aspect of the left foot. Using Metzenbaum scissors the soft tissue inside the wound was debulked. Using rongeurs necrotic tissue was removed from the surgical site and healthy bleeding tissue was noted. After inspection of the surgical wound there was no other signs of the infection tracking. Next, pulse lavage was performed using 1000 mL sterile saline. No additional purulence was noticed following irrigation. Wound closure: At this time the wound was noted to have granular, bleeding tissue. No further necrotic tissue or kodak purulent drainage was present. At this time, it was decided that no further debridement is required. The skin was closed using 3-0 nylon in a retention and simple interrupted suture technique. Procedure #2 Amnio flow injection: Attention was then drawn to the lateral aspect of the left foot where the previously open wound was located. After the AmniFlo was thawed per manufacture's instruction, it was then drawn up into a sterile syringe and injected within the surgical site. The graft was applied in efforts to reduce inflammation, and prevent adhesions and fibrotic scar tissue. Procedure #3 Incision and Drainage with partial cuboid resection of the right lower extremity: At this time, attention was directed to the patients Right foot. Using rongeurs necrotic discolored tissue was debrided off the surgical site. Attention was then directed to the cuboid where it was noted to be hard. An intraoperative decision was made not to remove the cuboid due to patient's likelihood of right lower extremity becoming unstable after removal. However A portion of the cuboid was sent to Microbiology and path for culture and sensitivity. There was no kodak purulent drainage encountered here at this time. The wound was irrigated copiously with 2000 mL normal sterile saline via pulse lavage. Upon completion of this hemostasis was achieved with electro cauterization. The distal end of the surgical wound was closed with 2-0 Vicryl and 3-0 nylon. Procedure #4 Wound Vac Application: At this time a piece of non-adherent dressing was placed over the incision site. Next, a KCI continuous Wound Vac was placed over the patient's incision site using the manufacturers instructions. Tegaderm was used to cover and protect the surrounding soft tissues from maceration.  The white foam was cut to size and placed over the exposed cuboid. The black foam was cut to size and placed over the surgical incision. This was then covered with clear tegaderm. Next, the wound vac was connected to the suction device at a rate of 125mmHg with no leaks noted and great negative pressure noted. The patient tolerated the procedure well. At this time, a local anesthetic was injected about the incision sites consisting of 10 cc of half percent Marcaine plain to the right lower extremity and 10 cc of half percent Marcaine plain to the left lower extremity, for the patient's postoperative comfort. A soft sterile dressing was applied consisting of Adaptic, gauze, Kerlix, Ace to the left lower extremity. A soft sterile dressing was applied consisting of gauze,ABD, Kerlix and Ace to the right lower extremity. END OF PROCEDURE: The patient tolerated the procedure and anesthesia well and was transported from the operating room to the PACU with vital signs stable and vascular status intact to all aspects of the patient's bilateral lower extremity. While in PACU it was found that patient was unresponsive to sternal rubs and had to be put on BiPAP. Patient had to be Narcaned to wake up. Following a period of post-operative monitoring, the patient will return to the PCU to continue IV antibiotics per ID recommendations. PT/OT will see patient. There is no further surgical intervention needed from a podiatry standpoint.     This operative report was dictated on behalf of Dr. Deana Titus DPM.      Electronically signed by You Santos DPM on 10/13/2021 at 6:57 PM

## 2021-10-13 NOTE — ANESTHESIA PRE PROCEDURE
Department of Anesthesiology  Preprocedure Note       Name:  Yoli Avilez   Age:  60 y.o.  :  1965                                          MRN:  [id number]         Date:  10/13/2021      Surgeon: Lonna Frankel):  Beau Tony DPM    Procedure: Procedure(s):  REPEAT INCISION AND DRAINAGE OF ALL NONVIABLE SOFT TISSUE AND BONE/ POSSIBLE PARTIAL CUBOID RESECTION/ POSSIBLE BONY BIOPSY AS NEEDED/ POSSILBE DELAYED PRIMARY CLOSURE WITH SKIN GRAFT APPLICATION VERSUS WOUND VAC BILATERAL LOWER EXTREMITIES    Medications prior to admission:   Prior to Admission medications    Medication Sig Start Date End Date Taking? Authorizing Provider   gabapentin (NEURONTIN) 400 MG capsule Take 1 capsule by mouth 2 times daily for 90 days. 21 Yes Ashley Ziegler MD   doxazosin (CARDURA) 4 MG tablet Take 1 tablet by mouth 2 times daily 21  Yes Ashley Ziegler MD   ELIQUIS 5 MG TABS tablet TAKE 1 TABLET BY MOUTH TWICE DAILY 21  Yes Ashley Ziegler MD   escitalopram (LEXAPRO) 10 MG tablet Take 1 tablet by mouth daily 21  Yes Andrew Chaparro MD   omeprazole (PRILOSEC) 20 MG delayed release capsule Take 1 capsule by mouth every morning 21  Yes Ashley Ziegler MD   furosemide (LASIX) 20 MG tablet Take 1 tablet by mouth 2 times daily 21  Yes Ashley Ziegler MD   methadone (DOLOPHINE) 10 MG/ML solution Take 140 mg by mouth daily. Verified dose with [facility] 320 ([phone]) 21   Yes Historical Provider, MD   amitriptyline (ELAVIL) 100 MG tablet TAKE 1 TABLET BY MOUTH EVERY NIGHT AT BEDTIME 6/15/21  Yes Rachael Stuart MD   metoprolol succinate (TOPROL XL) 100 MG extended release tablet Take 1 tablet by mouth daily 21  Yes Christy Naidu MD   atorvastatin (LIPITOR) 20 MG tablet Take 1 tablet by mouth nightly 21  Yes Ashley Ziegler MD   ammonium lactate (LAC-HYDRIN) 12 % lotion Apply topically daily.   Patient taking differently: Apply topically as needed Apply topically daily. 3/24/21  Yes Abbey Olson MD   insulin glargine [facility]) 100 UNIT/ML injection pen Inject 50 Units into the skin daily 20  Yes Jory Liao MD   insulin aspart (NOVOLOG FLEXPEN) 100 UNIT/ML injection pen Inject 8 Units into the skin 3 times daily (before meals)  Patient taking differently: Inject 8 Units into the skin 3 times daily (before meals) Uses as sliding scale per B-250 = 2 units  251-300 = 4 units  301-350 = 6 units  351-400 = 8 units  >400 = 10 units 20  Yes Wily Rocha MD   nystatin (MYCOSTATIN) 574545 UNIT/GM powder Apply topically 2 times daily Apply to right breast and groin area BID 20  Yes Jennifer Hunter MD   aspirin EC 81 MG EC tablet Take 1 tablet by mouth daily 20  Yes Jennifer Hunter MD   albuterol sulfate  (90 Base) MCG/ACT inhaler Inhale 2 puffs into the lungs every 6 hours as needed for Wheezing  Patient taking differently: Inhale 2 puffs into the lungs every 6 hours as needed for Wheezing  20  Yes Shameka Chavis MD   blood glucose test strips (TRUE METRIX BLOOD GLUCOSE TEST) strip 1 each by In Vitro route 2 times daily As needed.  20  Jennifer Hunter MD   Insulin Pen Needle 31G X 5 MM MISC 1 each by Does not apply route daily 20   Jennifer Hunter MD   Lancets MISC 1 each by Does not apply route 2 times daily PHARMACY MAY SUBSTITUTE TO TRUE METRIX LANCETS 20   Jennifer Hunter MD   Gauze Pads & Dressings MISC Please dispense 4x8 guaze, kerlix, and ace 18   Frank Roque DPM       Current medications:    Current Facility-Administered Medications   Medication Dose Route Frequency Provider Last Rate Last Admin    dextrose 50 % IV solution  12.5 g IntraVENous PRN Xochilt Cobb MD        dextrose 5 % solution  100 mL/hr IntraVENous PRN Xochilt Cobb MD       [facility] by provider] heparin (porcine) injection 5,000 Units  5,000 Units SubCUTAneous 3 times per day Halley Parsons MD 5,000 Units at 10/11/21 2157    vancomycin (VANCOCIN) 750 mg in dextrose 5 % 250 mL IVPB  750 mg IntraVENous Q24H Sommer Antony MD   Stopped at 10/12/21 1827    metoprolol succinate (TOPROL XL) extended release tablet 50 mg  50 mg Oral Daily Sommer Antony MD   50 mg at 10/12/21 1466    hydrALAZINE (APRESOLINE) tablet 50 mg  50 mg Oral 3 times per day Alistair Rossi MD   50 mg at 10/13/21 0459    hydrALAZINE (APRESOLINE) injection 5 mg  5 mg IntraVENous Q6H PRN Alistair Rossi MD        0.9 % sodium chloride infusion   IntraVENous PRN Sommer Antony MD        lidocaine PF 1 % injection 5 mL  5 mL IntraDERmal Once Abraham Bobbi, DPM        sodium chloride flush 0.9 % injection 5-40 mL  5-40 mL IntraVENous 2 times per day Warwick Bobbi, DPM   10 mL at 10/12/21 2126    sodium chloride flush 0.9 % injection 5-40 mL  5-40 mL IntraVENous PRN Warwick Bobbi, DPM        0.9 % sodium chloride infusion  25 mL IntraVENous PRN Warwick Bobbi, DPM   Stopped at 10/10/21 2136    labetalol (NORMODYNE;TRANDATE) injection syringe 10 mg  10 mg IntraVENous Q4H PRN Abraham Bobbi, DPM        0.9 % sodium chloride infusion   IntraVENous PRN Abraham Bobbi, DPM        meropenem (MERREM) 1,000 mg in sodium chloride 0.9 % 100 mL IVPB (mini-bag)  1,000 mg IntraVENous Q12H Warwick Bobbi,  mL/hr at 10/13/21 0506 1,000 mg at 10/13/21 0506    glucose (GLUTOSE) 40 % oral gel 15 g  15 g Oral PRN Abraham Bobbi, DPM        dextrose 50 % IV solution  12.5 g IntraVENous PRN Warwick Bbobi, DPM        glucagon (rDNA) injection 1 mg  1 mg IntraMUSCular PRN Warwick Bobbi, DPM        dextrose 5 % solution  100 mL/hr IntraVENous PRN Abarham Bobbi, DPM        insulin glargine (LANTUS;BASAGLAR) injection pen 12 Units  12 Units SubCUTAneous Nightly Abraham Bobbi, DPM   12 Units at 10/12/21 2013    insulin lispro (1 Unit Dial) 0-18 Units  0-18 Units SubCUTAneous TID WC Warwick Bobbi, DPM   6 Units at 10/11/21 1654    insulin lispro (1 Unit Dial) 0-9 Units  0-9 Units SubCUTAneous Nightly Mirian Rock, DPM   3 Units at 10/12/21 2013    fluconazole (DIFLUCAN) 200 mg IVPB  200 mg IntraVENous Q24H Mirian Rock,  mL/hr at 10/12/21 1719 200 mg at 10/12/21 1719    albuterol (PROVENTIL) nebulizer solution 2.5 mg  2.5 mg Nebulization Q6H PRN Mirian Rock, DPM        amitriptyline (ELAVIL) tablet 100 mg  100 mg Oral Nightly Mirian Rock, DPM   100 mg at 10/12/21 2012    aspirin EC tablet 81 mg  81 mg Oral Daily Mirian Rock, DPM   81 mg at 10/12/21 0835    atorvastatin (LIPITOR) tablet 20 mg  20 mg Oral Nightly Mirian Rock, DPM   20 mg at 10/12/21 2012    escitalopram (LEXAPRO) tablet 10 mg  10 mg Oral Daily Mirian Rock, DPM   10 mg at 10/12/21 0251    gabapentin (NEURONTIN) capsule 400 mg  400 mg Oral BID Mirian Rock, DPM   400 mg at 10/12/21 2012    methadone (DOLOPHINE) tablet 140 mg  140 mg Oral Daily Mirian Rock, DPM   140 mg at 10/12/21 0835    pantoprazole (PROTONIX) tablet 40 mg  40 mg Oral Daily Mirian Rock, DPM   40 mg at 10/12/21 0836    sodium chloride flush 0.9 % injection 5-40 mL  5-40 mL IntraVENous PRN Mirian Arriola, DPM        0.9 % sodium chloride infusion  25 mL IntraVENous PRN Mirian Rock,  mL/hr at 10/11/21 0507 25 mL at 10/11/21 0507    ondansetron (ZOFRAN-ODT) disintegrating tablet 4 mg  4 mg Oral Q8H PRN Mirian Rock, DPM        Or    ondansetron (ZOFRAN) injection 4 mg  4 mg IntraVENous Q6H PRN Mirian Rock, DPM        polyethylene glycol (GLYCOLAX) packet 17 g  17 g Oral Daily PRN Mirian Rock, DPM        acetaminophen (TYLENOL) tablet 650 mg  650 mg Oral Q6H PRN Mirian Rock, DPM   650 mg at 10/06/21 1244    Or    acetaminophen (TYLENOL) suppository 650 mg  650 mg Rectal Q6H PRN Mirian Rock, DPM        sodium chloride flush 0.9 % injection 5-40 mL  5-40 mL IntraVENous 2 times per day Mirian Arriola, JEREDM   10 mL at 10/12/21 2126    influenza quadrivalent split vaccine (FLUZONE;FLUARIX;FLULAVAL;AFLURIA) injection 0.5 mL  0.5 mL IntraMUSCular Prior to discharge Inna Banegas DPM           Allergies: Allergies   Allergen Reactions    Sulfa Antibiotics Hives, Itching and Rash       Problem List:    Patient Active Problem List   Diagnosis Code    Feet clawing Q66.89    Diabetic neuropathy ([facility] E11.40    Hyperlipidemia E78.5    HTN (hypertension) I10    Amenorrhea N91.2    Dyspareunia [id number]    Neuropathy G62.9    Bacterial vaginosis N76.0, B96.89    Low back pain M54.50    Anomalies of nails Q84.6    Tobacco abuse Z72.0    Carpal tunnel syndrome G56.00    Scalp lesion L98.9    ETOH abuse F10.10    Pseudocyst, pancreas K86.3    Asthma J45.909    Nicotine addiction F17.200    Hypoxia R09.02    Onychomycosis B35.1    Depression F32. A    Anxiety state F41.1    Tinea pedis B35.3    Burn T30.0    Obesity (BMI 30-39. 9) E66.9    S/P foot surgery, right Z98.890    Open wound of left foot with complication O12.262K    Cellulitis L03.90    Bilateral lower extremity edema R60.0    Chronic multifocal osteomyelitis of left foot ([facility]) D98.858    Acute systolic congestive heart failure ([facility]) I50.21    Acute osteomyelitis of metatarsal bone of right foot ([facility]) M86.171    Bronchitis J40    Cellulitis and abscess of foot L03.119, L02.619    Group B streptococcal infection A49.1    Tendonitis, Achilles, right M76.61    Diabetic ulcer of right midfoot associated with type 2 diabetes mellitus, limited to breakdown of skin ([facility]) E11.621, L97.411    Opiate dependence ([facility]) F11.20    Class 2 obesity due to excess calories with body mass index (BMI) of 37.0 to 37.9 in adult E66.09, Z68.37    CKD (chronic kidney disease), stage III ([facility]) N18.30    Diabetic foot infection ([facility]) E11.628, L08.9    Chronic osteomyelitis of right foot with draining sinus ([facility]) M86.471    FAHEEM (acute kidney injury) ([facility]) N17.9    Acute blood loss anemia D62    Aspiration pneumonitis ([facility]) J69.0    Altered mental status R41.82    Chronic systolic heart failure ([facility]) I50.22    Gastroesophageal reflux disease K21.9    Type 2 diabetes mellitus with right diabetic foot infection ([facility]) E11.628, D98.6    Systolic CHF, acute ([facility]) I50.21    Type 2 diabetes mellitus with left diabetic foot infection ([facility]) E11.628, L08.9    Lymphedema of left leg I89.0    Lymphedema of right lower extremity I89.0    Charcot's joint of ankle M14.679    Elevated sed rate R70.0    Elevated C-reactive protein (CRP) R79.82    History of transmetatarsal amputation of right foot ([facility] Z89.431    History of alcoholism ([facility] F10.21    Ex-smoker E36.996    History of MRSA infection Z86.14    CKD (chronic kidney disease) stage 4, GFR 15-29 ml/min ([facility]) N18.4       Past Medical History:        Diagnosis Date    Asthma 2004    Bacterial vaginosis 2008    Carpal tunnel syndrome 2007    COPD (chronic obstructive pulmonary disease) ([facility]     Diabetes mellitus type II 2007    10/1/20 pt states does accucheck 2x/day at home    [facility] 9444    Diastolic CHF ([facility])     DVT (deep venous thrombosis) ([facility] 2004    Dyslipidemia 2009    Dyspareunia 2009    ETOH abuse 2007    Feet clawing     HTN (hypertension)     Hx of blood clots     Hyperlipidemia     MRSA (methicillin resistant staph aureus) culture positive 2017; 2017    foot; leg     Neuropathy 2009    polyneuropathy    Pancreatitis 2004    Scalp lesion 2007    Tobacco abuse 2008    Uses walker     Uses wheelchair     also uses walker    Wears dentures        Past Surgical History:        Procedure Laterality Date     SECTION  unknown    FOOT DEBRIDEMENT Right 2019    INCISION AND DRAINAGE WITH APPLICATION OF STRAVIX GRAFT RIGHT FOOT performed by Shanel Deivne DPM at [address] [place] [address] Right 2019    RIGHT FOOT INCISION AND DRAINAGE WITH STAGING TRANSMETATARSAL AMPUTATION performed by Krista Santos, RAMONE at [address] Right 7/5/2019    RIGHT FOOT DEBRIDEMENT INCISION AND DRAINAGE, OPEN DIABETIC FOOT ULCER WITH GRAFT PLACEMENT performed by Krista Santos, DPM at [address] Left 10/23/2019    LEFT FOOT INCISION AND DRAINAGE , DEBRIDEMENT OF OPEN WOUND, APPLICATION OF STRAVIX GRAFT performed by Krista Santos, DPM at [address] Right 3/29/2021    INCISION AND DRAINAGE, DEBRIDEMENT OF DIABETIC WOUND WITH PLACEMENT OF STRAVIX GRAFT RIGHT FOOT performed by Krista Santos, DPM at [address]  10/7/2021    BILATERAL LOWER EXTREMITY INCISION AND DRAINAGE, RIGHT FOOT 4TH RAY RESECTION, LEFT FOOT 5TH RAY RESECTION performed by Krista Santos DPM at [address] IR TUNNELED CATHETER PLACEMENT GREATER THAN 5 YEARS  10/5/2021    IR TUNNELED CATHETER PLACEMENT GREATER THAN 5 YEARS 10/5/2021 [facility] SPECIAL PROCEDURES    KNEE SURGERY Left     from falling off ladder -- has screws in place pt report    OTHER SURGICAL HISTORY Left 05/25/2016    I & D left foot    OTHER SURGICAL HISTORY Right 10/20/2017    RIGHT GASTROC LENGTHENING ENDOSCOPIC, INJECTION OF AMNI GRAFT    OTHER SURGICAL HISTORY Right 04/26/2018    Diabetic foot ulcer I&D w/ integra graft application    [place] DEBRIDEMENT, SKIN, SUB-Q TISSUE,MUSCLE,BONE,=<20 SQ CM Right 8/17/2018    RIGHT FOOT DEBRIDEMENT INCISION AND DRAINAGE, PARTIAL 5TH RAY AMPUTATION performed by Krista Santos DPM at [address] PRE-MALIGNANT / [address]  7/7003    cryotherapy done on lesion    TOE AMPUTATION Left 02/24/2017    AMPUTATION LEFT GREAT TOE                 TONSILLECTOMY         Social History:    Social History     Tobacco Use    Smoking status: Former Smoker     Packs/day: 1.00     Years: 30.00     Pack years: 30.00     Types: Cigarettes     Quit date: 6/1/2018     Years since quitting: 3.3    Smokeless tobacco: Never Used    Tobacco comment: PER PT USES A NICOTINE VAPE NOW AND NO CIGARETTES   Substance Use Topics    Alcohol use: No     Alcohol/week: 4.0 - 5.0 standard drinks     Types: 4 - 5 Standard drinks or equivalent per week     Comment: hx of etoh abuse, denies recent etoh use; last drink 2 years ago                                Counseling given: Not Answered  Comment: PER PT USES A NICOTINE VAPE NOW AND NO CIGARETTES      Vital Signs (Current):   Vitals:    10/12/21 2204 10/12/21 2325 10/13/21 0449 10/13/21 0822   BP: 133/82 134/70 (!) 147/70 (!) 152/69   Pulse: 66 68 70 69   Resp:  18  20   Temp:  98.1 °F (36.7 °C) 97.4 °F (36.3 °C) 98.2 °F (36.8 °C)   TempSrc:  Oral Oral Oral   SpO2:  93% 93% 94%   Weight:   207 lb 3.7 oz (94 kg)    Height:                                                  BP Readings from Last 3 Encounters:   10/13/21 (!) 152/69   10/07/21 132/61   08/12/21 (!) 179/69       NPO Status:                                                   Date of last liquid consumption: 10/06/21                        Date of last solid food consumption: 10/06/21    BMI:   Wt Readings from Last 3 Encounters:   10/13/21 207 lb 3.7 oz (94 kg)   08/12/21 200 lb (90.7 kg)   06/18/21 218 lb 0.6 oz (98.9 kg)     Body mass index is 37.9 kg/m².     CBC:   Lab Results   Component Value Date    WBC 8.1 10/13/2021    RBC 3.29 10/13/2021    HGB 9.1 10/13/2021    HCT 28.1 10/13/2021    MCV 85.5 10/13/2021    RDW 17.4 10/13/2021     10/13/2021       CMP:   Lab Results   Component Value Date     10/13/2021    K 5.3 10/13/2021    K 4.5 10/01/2021     10/13/2021    CO2 26 10/13/2021    BUN 39 10/13/2021    CREATININE 1.7 10/13/2021    GFRAA 37 10/13/2021    GFRAA 98 10/31/2012    AGRATIO 0.8 06/17/2021    LABGLOM 31 10/13/2021    GLUCOSE 52 10/13/2021    PROT 6.9 10/01/2021    PROT 6.6 07/21/2011    CALCIUM 9.1 10/13/2021    BILITOT <0.2 10/01/2021    ALKPHOS 131 10/01/2021    AST 16 10/01/2021    ALT 13 10/01/2021       POC Tests:   Recent Labs     10/13/21  0721   POCGLU 71       Coags:   Lab Results   Component Value Date    PROTIME 10.5 10/11/2021    INR 0.93 10/11/2021    APTT 61.3 10/05/2021       HCG (If Applicable):   Lab Results   Component Value Date    PREGTESTUR Negative 06/06/2019        ABGs:   Lab Results   Component Value Date    PHART 7.48 07/19/2011    PO2ART 59 07/19/2011    HYG7IBI 49 07/19/2011    AKG5RSU 36 07/19/2011    BEART 10.8 07/19/2011        Type & Screen (If Applicable):  No results found for: LABABO, LABRH    Drug/Infectious Status (If Applicable):  No results found for: HIV, HEPCAB    COVID-19 Screening (If Applicable):   Lab Results   Component Value Date    COVID19 Not Detected 03/25/2021           Anesthesia Evaluation  Patient summary reviewed no history of anesthetic complications:   Airway: Mallampati: III  TM distance: >3 FB   Neck ROM: full  Mouth opening: > = 3 FB Dental:    (+) upper dentures and lower dentures      Pulmonary:   (+) COPD:                             Cardiovascular:    (+) hypertension:, CHF:,                   Neuro/Psych:   (+) depression/anxiety              ROS comment: LBP    Neuropathy  GI/Hepatic/Renal:   (+) renal disease: CRI,          ROS comment: Pancreatitis  2004  Stopped ETOH  2004. Endo/Other:    (+) Diabetes, . ROS comment: Osteomyelitis  Abdominal:             Vascular: Other Findings:             Anesthesia Plan      general     ASA 3       Induction: intravenous. Anesthetic plan and risks discussed with patient.                       Isabel Hernandez MD   10/13/2021

## 2021-10-13 NOTE — PROGRESS NOTES
ABG's drawn per RT on 3.5 LPNC. Repositioned in bed, wet washcloth applied over face, shoulders, belly patient grimaced/moaned but never opened eyes. RR 9 @ this moment.  Checked ETCO2 ranged 36-47

## 2021-10-13 NOTE — PROGRESS NOTES
Clinical Pharmacy Progress Note    Vancomycin - Management by Pharmacy    Consult Date(s): 10/2/21  Consulting Provider(s): Dr. Diego Barrett / Plan  1) Osteomyelitis of B/L feet - Vancomycin   Concurrent Antimicrobials:  Meropenem + Fluconazole   Day of Vanc Therapy: day #12   Current Dosing Method:  AUC  o Therapeutic Goal: -600mg/L*hr   Current Dose / Frequency / Plan / Rationale:   o Continue 750mg IV q24h. Random level 10/11= 23.6 (drawn ~12hr after prior dose) - with improvement in SCr yesterday & today, Bayesian kinetics predict an AUC of 458 and steady state trough of 15. 1.  o Will check repeat random level in AM 10/14 to ensure accumulation isn't occurring.  Clinical condition will be monitored closely, and levels will be ordered as clinically indicated. Please call with questions--  Thanks--  Huey Smith, PharmD, BCPS, BCGP  [id number] ([facility])   10/13/2021 11:51 AM        Interval Update:  Planning for return to OR today for further I&D with possible further cuboid resection and possible DPC. Subjective/Objective:   Abraham Garcia is a 60 y.o. female with a PMHx significant for CKD4, DVT (2004) on apixaban, DM2, chronic B/L LE ulcers, HTN, and chronic pain on methadone who is admitted with B/L LE osteomyelitis. Pt underwent B/L LE I&D with left 5th ray resection, right 4th ray resection, and rt partial cuboid resection on 10/7. Pharmacy is consulted to dose vancomycin.     Current antibiotics:  Fluconazole 200mg IV q24h (10/2-current)  Meropenem 1000mg IV q12h (10/2-current)  Vancomycin - Pharmacy to dose   Intermittent dosing (10/2-10/10)   750mg IV q24h (10/10-current)      Ht Readings from Last 1 Encounters:   10/01/21 5' 2\" (1.575 m)       Wt Readings from Last 1 Encounters:   10/13/21 207 lb 3.7 oz (94 kg)     Vancomycin Level(s) / Doses:    Date Time Dose Level / Type of Level Interpretation   10/2 1401 2250 mg IV x1     10/3 0712   Random = 24.5 mcg/mL Drawn ~19 hrs after previous dose given. Hold dose. 10/4 0829  Random =18.1 mcg/mL  Re-dose 500 mg IV x1    1231 500 mg IV x1     10/5 0307  Random = 18.8 mcg/mL Drawn 14.5 hrs after dose    1231 500 mg IV x1  Re-dose with 500 mg IV x 1   10/6 0849  Random = 18.6 mcg/mL Drawn 18h after dose. Will re-dose 500 mg IV x1    1511 500 mg IV x 1     10/7 0520  Random = 15.6 mcg/mL Switch to AUC-based dosing. Schedule vancomycin 750 mg IV Q24 hours and re-assess random 10/8.    1705 750 mg IV x 1     10/8 0454  Random = 18.6 mcg/mL Continue current regimen of 750 mg IV Q24h. Predicted to achieve AUC of 538 mg/L*hr.   10/8 15:25 750mg IV x1     10/9 14:18  Random = 18.6 mcg/mL Drawn ~24 hrs after previous dose. 10/9 16:40 500mg IV x1     10/10 16:58 750mg IV x1     10/11 05:21 750mg IV q24h Random = 23.6 mcg/mL Drawn ~12 hr after prior dose given, predicted  on 750mg q24h   10/13 06:00 750mg IV q24h Random = ordered             Cultures & Sensitivities:  Date Site Micro Susceptibility / Result   10/2 Foot tissue, right Pseudomonas aeruginosa     S: cefepime, gent, meropenem, Zosyn, tobramycin  R: Cipro     Foot tissue, right E. coli  S: Cefepime, ceftriaxone, cefuroxime, ertapenem, gent, meropenem, Zosyn, Bactrim  R: Cipro    Foot tissue, right Enterococcus faecalis S: Ampicillin, Vancomycin      Foot tissue, right Providencia stuartii No further w/u   10/2 Foot wound, unclear R/L Pseudomonas aeruginosa Same as above    Foot wound, unclear R/L E.coli Same as above    Foot wound, unclear R/L Enterococcus faecalis Same as above   10/7 Tissue foot, right Pseudomonas aeruginosa S: cefepime, gent, tobramycin, meropenem, Zoysn  R: cipro     Labs / Ancillary Data:    Estimated Creatinine Clearance: 39 mL/min (A) (based on SCr of 1.7 mg/dL (H)).     Recent Labs     10/11/21  0521 10/12/21  0432 10/12/21  0525 10/13/21  0506   CREATININE 2.0* 1.7*  --  1.7*   BUN 43* 40*  --  39*   WBC 7.3  --  7.7 8.1

## 2021-10-13 NOTE — PROGRESS NOTES
Pt has MV of 5.0 and VT of 270 on Bipap 18/5 and set rate of 20. Rate increased to 24 and Bipap increased to 20/5.

## 2021-10-13 NOTE — BRIEF OP NOTE
Brief Postoperative Note      Patient: Ronni Fofana  YOB: 1965  MRN: [id number]    Date of Procedure: 10/13/2021    Pre-Op Diagnosis: OSTEOMYELITIS 4TH RIGHT TOE & 5TH LEFT TOE    Post-Op Diagnosis: Same       Procedures:  Left lower extremity: Incision and Drainage with delayed  Primary closure  Right lower extremity: Incision and drainage, partial cuboid resection, wound vac application    Surgeon(s):  Tawanda Pablo DPM      Assistant(s):    Alhaji Koroma PGY-2  Dominique Petty PGY-1    Anesthesia:   General    Injectables:  pre-op 20 cc of 2% lidocaine plain and post-op 20 cc of 0.5% marcaine plain and 6 cc of amniflow    Hemostasis:   anatomic dissection and electrocautery    Materials: 2-0 vicryl, 3-0 nylon, white foam, black foam, wound vac to the right lower extremity    Estimated Blood Loss: 105     Complications: None    Specimens:   ID Type Source Tests Collected by Time Destination   1 : CUBOID RIGHT FOOT Bone Bone CULTURE, FUNGUS, CULTURE, SURGICAL, CULTURE WITH SMEAR, ACID FAST BACILLIUS Tawanda Pablo DPM 10/13/2021 1153    A : CUBOID RIGHT FOOT  Tissue Tissue SURGICAL PATHOLOGY Tawanda Pablo DPM 10/13/2021 1151        Implants:  * No implants in log *      Drains: * No LDAs found *    Findings: No purulent drainage encountered to bilateral lower extremities. Left necrotic tissue removed and debulked; cuboid white, hard, and healthy. Right necrotic tissue noted and removed; cuboid noted to be hard;with wound vac placed to the right lower extremity    DISPO: s/p I&D with partial cuboid resection and application of wound vac to the right foot and I&D of the left foot with delayed primary closure of the left lower extremity. Patient will be nonweight bearing to bilateral lower extremity. Patient will return back to the floor for continued iV antibiotics; per ID recommendations. PT/OT eval placed. No further surgical intervention needed from a podiatry standpoint.   Patient ok to discharge from podiatry standpoint pending PT/OT eval, SNF placement, and medical Improvement.       Electronically signed by Arianne Gibson DPM on 10/13/2021 at 1:10 PM

## 2021-10-13 NOTE — PROGRESS NOTES
ID Follow-up NOTE    CC:   Diabetic foot ulcer / infection  Antibiotics: Vancomycin, Meropenem    Admit Date: 10/1/2021  Hospital Day: 13    Subjective:     OR today   - L foot - I&D, closure   - R foot - I&D, further cuboid resection, VAC placed  Postop hypoventilation, admit to PCU    Lethargic - unable to provide hx      Objective:     Patient Vitals for the past 8 hrs:   BP Temp Temp src Pulse Resp SpO2   10/13/21 1800 130/77 97 °F (36.1 °C) Temporal 70 17 94 %   10/13/21 1745 (!) 111/95   73 23    10/13/21 1730 96/80   74 20 95 %   10/13/21 1727     22 98 %   10/13/21 1719     15 100 %   10/13/21 1645     18    10/13/21 1622     19    10/13/21 1604     14    10/13/21 1600 (!) 96/49   53 11 100 %   10/13/21 1545 (!) 96/47   55 (!) 7 97 %   10/13/21 1530 (!) 102/51   58 15 93 %   10/13/21 1529     14 96 %   10/13/21 1515 (!) 100/52   59 10 94 %   10/13/21 1500 (!) 104/40   82 29 91 %   10/13/21 1445 (!) 98/45   57 8 95 %   10/13/21 1430 (!) 99/44   82 11 93 %   10/13/21 1415 (!) 100/46   60 8 93 %   10/13/21 1400 (!) 106/52   60 9 91 %   10/13/21 1345 (!) 105/51   61 9 95 %   10/13/21 1330 (!) 102/49   62 10 94 %   10/13/21 1315 (!) 98/49   63 9 94 %   10/13/21 1310 (!) 97/48   63 9 95 %   10/13/21 1305 (!) 97/48   64 9 95 %   10/13/21 1301 (!) 96/48 96.9 °F (36.1 °C) Temporal 64 10 91 %     I/O last 3 completed shifts: In: 900 [P.O.:200; I.V.:700]  Out: 800 [Urine:800]  No intake/output data recorded. EXAM:  GENERAL: No apparent distress.   2L  HEENT: Membranes moist, no oral lesion  NECK:  Supple, no lymphadenopathy  LUNGS: Clear b/l, no rales, no dullness  CARDIAC: RRR, no murmur appreciated  ABD:  Obese, + BS, soft / NT  EXT:  LE venous stasis, b/l foot dressing / ACE in place  NEURO: No focal neurologic findings  PSYCH: Orientation, sensorium, mood normal  LINES:  R chest tunnel venous line in place       Data Review:  Lab Results   Component Value Date    WBC 8.1 10/13/2021    HGB 9.1 (L) 10/13/2021    HCT 28.1 (L) 10/13/2021    MCV 85.5 10/13/2021     10/13/2021     Lab Results   Component Value Date    CREATININE 1.7 (H) 10/13/2021    BUN 39 (H) 10/13/2021     10/13/2021    K 5.3 (H) 10/13/2021     10/13/2021    CO2 26 10/13/2021       Hepatic Function Panel:   Lab Results   Component Value Date    ALKPHOS 131 10/01/2021    ALT 13 10/01/2021    AST 16 10/01/2021    PROT 6.9 10/01/2021    PROT 6.6 07/21/2011    BILITOT <0.2 10/01/2021    BILIDIR <0.2 10/01/2021    IBILI see below 10/01/2021    LABALBU 2.6 10/01/2021       MICRO:  10/7 Surg cult: Gs 1+WBC, no organism; cult rare Ps aeruginosa, rare mixed skin sukhjinder  Antibiotic Interpretation ISAAC   cefepime Sensitive 8 mcg/mL   ciprofloxacin Resistant >2 mcg/mL   gentamicin Sensitive <=4 mcg/mL   meropenem Sensitive <=1 mcg/mL   piperacillin-tazobactam Sensitive <=16 mcg/mL   tobramycin Sensitive <=4 mcg/mL     10/2 Wound (not know if L or R): light Ps aeruginosa (R cipro), light 2nd E coli, light Enterococcus faecalis  Pseudomonas aeruginosa   Antibiotic Interpretation ISAAC   cefepime Sensitive 8 mcg/mL   ciprofloxacin Resistant >2 mcg/mL   gentamicin Sensitive <=4 mcg/mL   meropenem Sensitive <=1 mcg/mL   piperacillin-tazobactam Sensitive <=16 mcg/mL   tobramycin Sensitive <=4 mcg/mL     Escherichia coli   Antibiotic Interpretation ISAAC   amoxicillin-clavulanate Intermediate 16/8 mcg/mL   ampicillin Resistant >16 mcg/mL   ceFAZolin Resistant >16 mcg/mL   cefepime Sensitive <=2 mcg/mL   cefTRIAXone Sensitive <=1 mcg/mL   cefuroxime Sensitive 8 mcg/mL   ciprofloxacin Resistant >2 mcg/mL   ertapenem Sensitive <=0.5 mcg/mL   gentamicin Sensitive <=4 mcg/mL   meropenem Sensitive <=1 mcg/mL   piperacillin-tazobactam Sensitive <=16 mcg/mL   trimethoprim-sulfamethoxazole Sensitive <=2/38 mcg/mL     Enterococcus  faecalis   Antibiotic Interpretation ISAAC   ampicillin Sensitive <=2 mcg/mL vancomycin Sensitive 2 mcg/mL       IMAGING:  10/3 L foot MRI  Impression   Osteomyelitis involving the fifth metatarsal and fifth proximal phalanx with extensive osseous destruction. Mild osteomyelitis involving the lateral aspect of the cuboid. Prior partial first digit amputation     10/3 R foot MRI   Impression   Multiple prior amputations. Soft tissue ulceration lateral to the cuboid with mild acute osteomyelitis involving the lateral base of the fourth metatarsal and more distal plantar aspect of the remaining fourth metatarsal shaft as well as the lateral aspect of the cuboid.        Scheduled Meds:   naloxone        LORazepam        [Held by provider] heparin (porcine)  5,000 Units SubCUTAneous 3 times per day    vancomycin  750 mg IntraVENous Q24H    metoprolol succinate  50 mg Oral Daily    hydrALAZINE  50 mg Oral 3 times per day    lidocaine 1 % injection  5 mL IntraDERmal Once    sodium chloride flush  5-40 mL IntraVENous 2 times per day    meropenem  1,000 mg IntraVENous Q12H    insulin glargine  12 Units SubCUTAneous Nightly    insulin lispro  0-18 Units SubCUTAneous TID WC    insulin lispro  0-9 Units SubCUTAneous Nightly    fluconazole  200 mg IntraVENous Q24H    amitriptyline  100 mg Oral Nightly    aspirin EC  81 mg Oral Daily    atorvastatin  20 mg Oral Nightly    escitalopram  10 mg Oral Daily    gabapentin  400 mg Oral BID    methadone  140 mg Oral Daily    pantoprazole  40 mg Oral Daily    sodium chloride flush  5-40 mL IntraVENous 2 times per day    influenza virus vaccine  0.5 mL IntraMUSCular Prior to discharge       Continuous Infusions:   dextrose      sodium chloride      sodium chloride Stopped (10/10/21 2139)    sodium chloride      dextrose      sodium chloride 25 mL (10/11/21 2858)       PRN Meds:  LORazepam, buprenorphine, dextrose, dextrose, hydrALAZINE, sodium chloride, sodium chloride flush, sodium chloride, labetalol, sodium chloride, glucose, dextrose, glucagon (rDNA), dextrose, albuterol, sodium chloride flush, sodium chloride, ondansetron **OR** ondansetron, polyethylene glycol, acetaminophen **OR** acetaminophen      Assessment:     Obesity (BMI 37), DM, neuropathy, HTN, HL, CKD, chronic pain  Hx DM foot infection / surgeries - has R TMA, L hallux resection    L foot wound infection / osteomyelitis   - MRI with 5th MT + prox 5th phalanx destruction, lateral cuboid edema, reviewed images with Radiologist 10/4   - OR 10/7 - 5th ray resection; path - 'focal subacute, partially treated osteomyelitis' (L 5th MT)    R foot wound infection / osteomyelitis    - MRI with cuboid, 4th MT OM, reviewed images with Radiologist 10/4   - OR 10/7 - R 4th ray, partial cuboid resection - NOT definitive   - Path with 4th MT acute osteomyelitis, R cuboid 'focal acute osteomyelitis', R cuboid clearance frag 'rare focus of acute osteomyeltiis'   - OR 10/13 - L I&D and closure; R I&D, further cuboid resection, VAC    Cult polymicrobial with Ps aerug (R cipro), E coli, E faecalis    FAHEEM, Cr today (1.7)    Plan:     Cont vancomycin, meropenem for now     Return to OR today 10/12    - L I&D and closure, R I&D with further cuboid resection    See EBONY   - will need prolonged course iv antibiotics after discharge (vanco / ertapenem)    Medical Decision Making:   The following items were considered in medical decision making:  Discussion of patient care with other providers  Reviewed clinical lab tests  Reviewed radiology tests  Reviewed other diagnostic tests/interventions  Independent review of radiologic images - reviewed MRI with Radiologist 10/4  Microbiology cultures and other micro tests reviewed      Discussed with pt, Podiatry Resident  Christina Saha MD    INFUSION ORDERS:  Vancomycin 750 mg iv daily through 11/26  Invanz 1 gm iv daily through 11/26  - Diagnosis - DM foot osteomyelitis   - First dose given in hospital  - PICC   - Disposition / date discharge  - Check CBC w diff, CMP, ESR, CRP, CK every Mon or Tue - FAX result to [phone]  - Call with antibiotic / infusion issues, [phone]  - Call with any change in status, transfer in or out of a facility or to hospital - [phone]  - No f/u in outpatient ID office necessary

## 2021-10-13 NOTE — PROGRESS NOTES
On BIPAP 20/5, 40% with rate of 24. Patient remains unresponsive, low tidal volumes @ times. Anesthesia called again, Betina Ritter in PACU stated patient needs to go to ICU.  Will perfect serve MD.

## 2021-10-13 NOTE — PROGRESS NOTES
Rayna solano and agreed to hold AM methadone as pt can be difficult to wake up at times. Blood sugar this morning was 71- gave morning meds with chetna mist. Rechecked at 1015 and it was 58- pt was sleepy, but easy to arouse, skin was warm- gave 1/2 amp dextrose. Notified pacu and jez rn agreed to recheck pt's blood sugar when she gets down there. Removed upper denture. Pt verbalized understanding that she's going for surgery and then quickly fell back asleep. Telemetry removed.

## 2021-10-13 NOTE — PROGRESS NOTES
Office : [phone]     Fax :[phone]       Nephrology progress  Note      Patient's Name: Goldy Romero    Reason for Consult:  FAHEEM on CKD 4       Requesting Physician:  Daria Davis MD      Chief Complaint:    Chief Complaint   Patient presents with    Leg Pain     x 2 weeks. History of Present iIlness:    Goldy Romero is a 60 y.o. female with past medical h/o CKD4, DVT, T2DM c/b b/l lower extremity ulcers  who presented for progressively worsening b/l lower extremity pain for the 2 weeks. Patient states she presented today with pain in legs so severe that it is now inhibiting her ability to ambulate. She endorses that the legs are also more swollen and warm to touch with pain when she touches it. She endorses she tries to eat a healthy diet low in salt, she also denies any major weight fluctuations recently.     Baseline creat is 2/0   Now elevated at 2.4        Interval hx :    Cr better   BP better now   No N/V/D     K elevated         I/O last 3 completed shifts: In: 4715.5 [P.O.:200;  I.V.:2213; IV Piggyback:2302.5]  Out: 1300 [Urine:1300]    Past Medical History:   Diagnosis Date    Asthma 05/14/2004    Bacterial vaginosis 04/2008    Carpal tunnel syndrome 05/2007    COPD (chronic obstructive pulmonary disease) ([address]     Diabetes mellitus type II 08/2007    10/1/20 pt states does accucheck 2x/day at home    Diabetic neuropathy ([address] 63/5530    Diastolic CHF ([address]     DVT (deep venous thrombosis) ([address] 03/2004    Dyslipidemia 05/2009    Dyspareunia 05/2009    ETOH abuse 03/04/2007    Feet clawing     HTN (hypertension)     Hx of blood clots     Hyperlipidemia     MRSA (methicillin resistant staph aureus) culture positive 11/06/2017; 11/17/2017 DRAINAGE, PARTIAL 5TH RAY AMPUTATION performed by Ab Keita DPM at [address] [facility] / [address]  7/7003    cryotherapy done on lesion    TOE AMPUTATION Left 02/24/2017    AMPUTATION LEFT GREAT TOE                 TONSILLECTOMY         History reviewed. No pertinent family history. reports that she quit smoking about 3 years ago. Her smoking use included cigarettes. She has a 30.00 pack-year smoking history. She has never used smokeless tobacco. She reports that she does not drink alcohol and does not use drugs.         Allergies:  Sulfa antibiotics    Current Medications:    dextrose 50 % IV solution, PRN  dextrose 5 % solution, PRN  [Held by provider] heparin (porcine) injection 5,000 Units, 3 times per day  vancomycin (VANCOCIN) 750 mg in dextrose 5 % 250 mL IVPB, Q24H  metoprolol succinate (TOPROL XL) extended release tablet 50 mg, Daily  hydrALAZINE (APRESOLINE) tablet 50 mg, 3 times per day  hydrALAZINE (APRESOLINE) injection 5 mg, Q6H PRN  0.9 % sodium chloride infusion, PRN  lidocaine PF 1 % injection 5 mL, Once  sodium chloride flush 0.9 % injection 5-40 mL, 2 times per day  sodium chloride flush 0.9 % injection 5-40 mL, PRN  0.9 % sodium chloride infusion, PRN  labetalol (NORMODYNE;TRANDATE) injection syringe 10 mg, Q4H PRN  0.9 % sodium chloride infusion, PRN  meropenem (MERREM) 1,000 mg in sodium chloride 0.9 % 100 mL IVPB (mini-bag), Q12H  glucose (GLUTOSE) 40 % oral gel 15 g, PRN  dextrose 50 % IV solution, PRN  glucagon (rDNA) injection 1 mg, PRN  dextrose 5 % solution, PRN  insulin glargine (LANTUS;BASAGLAR) injection pen 12 Units, Nightly  insulin lispro (1 Unit Dial) 0-18 Units, TID WC  insulin lispro (1 Unit Dial) 0-9 Units, Nightly  fluconazole (DIFLUCAN) 200 mg IVPB, Q24H  albuterol (PROVENTIL) nebulizer solution 2.5 mg, Q6H PRN  amitriptyline (ELAVIL) tablet 100 mg, Nightly  aspirin EC tablet 81 mg, Daily  atorvastatin (LIPITOR) tablet 20 mg, Nightly  escitalopram (LEXAPRO) tablet 10 mg, Daily  gabapentin (NEURONTIN) capsule 400 mg, BID  methadone (DOLOPHINE) tablet 140 mg, Daily  pantoprazole (PROTONIX) tablet 40 mg, Daily  sodium chloride flush 0.9 % injection 5-40 mL, PRN  0.9 % sodium chloride infusion, PRN  ondansetron (ZOFRAN-ODT) disintegrating tablet 4 mg, Q8H PRN   Or  ondansetron (ZOFRAN) injection 4 mg, Q6H PRN  polyethylene glycol (GLYCOLAX) packet 17 g, Daily PRN  acetaminophen (TYLENOL) tablet 650 mg, Q6H PRN   Or  acetaminophen (TYLENOL) suppository 650 mg, Q6H PRN  sodium chloride flush 0.9 % injection 5-40 mL, 2 times per day  influenza quadrivalent split vaccine (FLUZONE;FLUARIX;FLULAVAL;AFLURIA) injection 0.5 mL, Prior to discharge        Review of Systems:   14 point ROS obtained but were negative except mentioned in HPI      Physical exam:     Vitals:  /82   Pulse 66   Temp 98.1 °F (36.7 °C) (Oral)   Resp 16   Ht 5' 2\" (1.575 m)   Wt 213 lb 13.5 oz (97 kg)   LMP 10/23/2015   SpO2 90%   BMI 39.11 kg/m²   Constitutional:  OAA X3 NAD  Skin: no rash, turgor wnl  Heent:  eomi, mmm  Neck: no bruits or jvd noted  Cardiovascular:  S1, S2 without m/r/g  Respiratory: b/L base crackles   Abdomen:  +bs, soft, nt, nd  Ext: +  lower extremity edema  Psychiatric: mood and affect appropriate  Musculoskeletal:  Rom, muscular strength intact    Labs:  CBC:   Recent Labs     10/10/21  0534 10/10/21  0534 10/11/21  0134 10/11/21  0521 10/12/21  0525   WBC 7.9  --   --  7.3 7.7   HGB 8.9*   < > 8.8* 8.8* 9.1*     --   --  296 324    < > = values in this interval not displayed.      BMP:    Recent Labs     10/10/21  0534 10/11/21  0521 10/12/21  0432    139 139   K 5.5* 5.3* 5.7*    107 105   CO2 25 26 27   BUN 46* 43* 40*   CREATININE 2.4* 2.0* 1.7*   GLUCOSE 168* 78 76     Ca/Mg/Phos:   Recent Labs     10/10/21  0534 10/11/21  0521 10/12/21  0432   CALCIUM 8.5 8.6 9.2   MG 2.20 2.00 2.10     Hepatic:   No results for input(s): AST, ALT, ALB, BILITOT, ALKPHOS in the last 72 hours. Troponin:   No results for input(s): TROPONINI in the last 72 hours. BNP: No results for input(s): BNP in the last 72 hours. Lipids: No results for input(s): CHOL, TRIG, HDL, LDLCALC, LABVLDL in the last 72 hours. ABGs: No results for input(s): PHART, PO2ART, VPW8YQA in the last 72 hours. INR:   Recent Labs     10/11/21  1848   INR 0.93     UA:  No results for input(s): Tang Alu, GLUCOSEU, BILIRUBINUR, KETUA, SPECGRAV, BLOODU, PHUR, PROTEINU, UROBILINOGEN, NITRU, LEUKOCYTESUR, Kiera Nurse in the last 72 hours. Urine Microscopic:   No results for input(s): LABCAST, BACTERIA, COMU, HYALCAST, WBCUA, RBCUA, EPIU in the last 72 hours. Urine Culture: No results for input(s): LABURIN in the last 72 hours. Urine Chemistry:   No results for input(s): Mj Alley, PROTEINUR, NAUR in the last 72 hours. IMAGING:  XR FOOT RIGHT (2 VIEWS)   Final Result      Expected postsurgical changes of right fourth ray and partial cuboid resection. XR FOOT LEFT (2 VIEWS)   Final Result      Expected postsurgical changes of left fifth ray resection. VL Extremity Venous Bilateral   Final Result      IR TUNNELED CVC PLACE WO SQ PORT/PUMP > 5 YEARS   Final Result   Impression:      Successful placement of a tunneled central venous catheter via the right internal jugular vein. The catheter is ready for immediate use. VL DUP LOWER EXTREMITY ARTERIES BILATERAL   Final Result      MRI FOOT LEFT WO CONTRAST   Final Result   Osteomyelitis involving the fifth metatarsal and fifth proximal phalanx with extensive osseous destruction. Mild osteomyelitis involving the lateral aspect of the cuboid. Prior partial first digit amputation. MRI FOOT RIGHT WO CONTRAST   Final Result   Multiple prior amputations.    Soft tissue ulceration lateral to the cuboid with mild acute osteomyelitis involving the lateral base of the fourth metatarsal and more distal plantar aspect of the remaining fourth metatarsal shaft as well as the lateral aspect of the cuboid. XR FOOT LEFT (MIN 3 VIEWS)   Final Result      Numerous osteotomies with soft tissue swelling and possible ulcer lateral aspect of the right midfoot. No plain radiographic evidence for osteomyelitis, however plain film findings for osteomyelitis are often late findings. 3 views left foot      FINDINGS:      There is a mottled appearance of the phalanges of the right fifth digit as well as the fifth metatarsal. The remaining metatarsals unremarkable in appearance. Patient's had prior osteotomy of the first distal phalangeal tuft. IMPRESSION:      Plain radiographic evidence for osteomyelitis involving the fifth phalanges and fifth metatarsal. Patient's bones are osteopenic. XR FOOT RIGHT (MIN 3 VIEWS)   Final Result      Numerous osteotomies with soft tissue swelling and possible ulcer lateral aspect of the right midfoot. No plain radiographic evidence for osteomyelitis, however plain film findings for osteomyelitis are often late findings. 3 views left foot      FINDINGS:      There is a mottled appearance of the phalanges of the right fifth digit as well as the fifth metatarsal. The remaining metatarsals unremarkable in appearance. Patient's had prior osteotomy of the first distal phalangeal tuft. IMPRESSION:      Plain radiographic evidence for osteomyelitis involving the fifth phalanges and fifth metatarsal. Patient's bones are osteopenic. XR CHEST PORTABLE   Final Result      Mild cardiomegaly and mild interstitial edema. Assessment/Plan :      1. Lin onc CKD 4   LIN 2/2 multiple factors   BP was low better now   Stop cardura   Stop lasix     2. HTN. BP controlled   - on hydralazine    3. Acute on chronic CHF   - stable     4. DM 2. Needs better control     5. Electrolytes. Monitor   - Low K diet   - Lokalema     6.  Diabetic foot infection with necrotic ulcer   Renal dose abx   ID following       Recommend to dose adjust all medications  based on renal functions  Maintain SBP> 90 mmHg   Daily weights   AVOID NSAIDs  Avoid Nephrotoxins  Monitor Intake/Output  Call if significant decrease in urine output           Thank you for allowing us to participate in care of Chitra Mccormick         Electronically signed by: Johny Carter MD, 10/12/2021, 10:24 PM      [facility] [address] [place] [address]  Office : [phone]  Fax :[phone]

## 2021-10-13 NOTE — PROGRESS NOTES
called and informed of:   ABG PH-7.263  PCO2-64,  PO2-53.5  Orders given for BIPAP 10/5, RT called and made aware.

## 2021-10-13 NOTE — ANESTHESIA POSTPROCEDURE EVALUATION
Department of Anesthesiology  Postprocedure Note    Patient: Nicholas Moya  MRN: [id number]  YOB: 1965  Date of evaluation: 10/13/2021  Time:  1:16 PM     Procedure Summary     Date: 10/13/21 Room / Location: [address] [place] [address] [address] [place] [address] / [facility]    Anesthesia Start: 1107 Anesthesia Stop: 6322    Procedure: REPEAT INCISION AND DRAINAGE OF ALL NONVIABLE SOFT TISSUE AND BONE/ PARTIAL CUBOID RESECTION/ BONY BIOPSY AS NEEDED/ WOUND VAC RIGHT LOWER EXTREMITY (Bilateral ) Diagnosis: (OSTEOMYELITIS 4TH RIGHT TOE & 5TH LEFT TOE)    Surgeons: Zan You DPM Responsible Provider: Nick Holden MD    Anesthesia Type: general ASA Status: 3          Anesthesia Type: general    Norma Phase I: Norma Score: 5    Norma Phase II:      Last vitals: Reviewed and per EMR flowsheets.        Anesthesia Post Evaluation    Patient location during evaluation: PACU  Patient participation: complete - patient participated  Level of consciousness: lethargic  Airway patency: patent  Nausea & Vomiting: no nausea and no vomiting  Complications: no  Cardiovascular status: hemodynamically stable  Respiratory status: nasal cannula  Hydration status: stable

## 2021-10-13 NOTE — PROGRESS NOTES
Progress Note    Admit Date: 10/1/2021  Day: 12  Diet: Diet NPO Exceptions are: Sips of Water with Meds    CC: B/L leg pain for 2-6 weeks    Interval history:   NAONE. Patient was seen and examined this am. Remained HDS overnight. Sating well on room air. Afebrile. Patient reports foot pain is under control. Denies any f/c, CP, SOB, cough, palpitations, c/d or urinary symptoms. Young Chyle to get surgery done today. NPO since midnight. HPI:   Tylermiorly Reynoso. Ivonne Moses is a 59 yo F with PMH CKD4, DVT (2004; on Eqliquis), T2DM c/b b/l lower extremity ulcers (follows with Podiatry outpatient, last visit 9/27/21), HTN, narcotic-dependent chronic pain (on Methadone 140 mg/day) who presented for progressively worsening b/l lower extremity pain for the 2 weeks. Patient states she presented today with pain in legs so severe that it is now inhibiting her ability to ambulate. She endorses that the legs are also more swollen and warm to touch with pain when she touches it. She endorses she tries to eat a healthy diet low in salt, she also denies any major weight fluctuations recently.     On ROS, denies fever/chills, nausea/vomiting, diarrhea/constipation, urinary issues including dysuria, hematuria, or changes to amount or flow of urine. She denies abdominal pain.      On arrival to [facility] ED, VSS. Patient appeared extremely sleepy on physical exam. Chemistry significant for Na 133, BUN 42/Cr 2.5. Pro-BNP elevated 4,733 (baseline 2-3k). Trops 0.04. CBC with Hgb 9.1 Hct 28.5 at baseline. CXR with mild cardiomegaly and interstitial edema.  She was given IV Lasix 40 in the ED and admitted to the floor.        Medications:     Scheduled Meds:   [Held by provider] heparin (porcine)  5,000 Units SubCUTAneous 3 times per day    vancomycin  750 mg IntraVENous Q24H    metoprolol succinate  50 mg Oral Daily    hydrALAZINE  50 mg Oral 3 times per day    lidocaine 1 % injection  5 mL IntraDERmal Once    sodium chloride flush  5-40 mL IntraVENous 2 times per day    meropenem  1,000 mg IntraVENous Q12H    insulin glargine  12 Units SubCUTAneous Nightly    insulin lispro  0-18 Units SubCUTAneous TID WC    insulin lispro  0-9 Units SubCUTAneous Nightly    fluconazole  200 mg IntraVENous Q24H    amitriptyline  100 mg Oral Nightly    aspirin EC  81 mg Oral Daily    atorvastatin  20 mg Oral Nightly    escitalopram  10 mg Oral Daily    gabapentin  400 mg Oral BID    methadone  140 mg Oral Daily    pantoprazole  40 mg Oral Daily    sodium chloride flush  5-40 mL IntraVENous 2 times per day    influenza virus vaccine  0.5 mL IntraMUSCular Prior to discharge     Continuous Infusions:   dextrose      sodium chloride      sodium chloride Stopped (10/10/21 2136)    sodium chloride      dextrose      sodium chloride 25 mL (10/11/21 8332)     PRN Meds:dextrose, dextrose, hydrALAZINE, sodium chloride, sodium chloride flush, sodium chloride, labetalol, sodium chloride, glucose, dextrose, glucagon (rDNA), dextrose, albuterol, sodium chloride flush, sodium chloride, ondansetron **OR** ondansetron, polyethylene glycol, acetaminophen **OR** acetaminophen    Objective:   Vitals:   T-max:  Patient Vitals for the past 8 hrs:   BP Temp Temp src Pulse Resp SpO2 Weight   10/13/21 0822 (!) 152/69 98.2 °F (36.8 °C) Oral 69 20 94 %    10/13/21 0449 (!) 147/70 97.4 °F (36.3 °C) Oral 70  93 % 207 lb 3.7 oz (94 kg)       Intake/Output Summary (Last 24 hours) at 10/13/2021 0939  Last data filed at 10/13/2021 0552  Gross per 24 hour   Intake 4915.45 ml   Output 800 ml   Net 4115.45 ml       Physical Exam  Physical Exam  Constitutional:       General: She is not in acute distress. HENT:      Head: Normocephalic and atraumatic. Nose: No congestion or rhinorrhea. Mouth/Throat:      Mouth: Mucous membranes are moist.      Pharynx: No oropharyngeal exudate or posterior oropharyngeal erythema. Eyes:      General: No scleral icterus.      Conjunctiva/sclera: Conjunctivae normal.   Cardiovascular:      Rate and Rhythm: Normal rate and regular rhythm. Heart sounds: No murmur heard. No gallop. Pulmonary:      Effort: No respiratory distress. Breath sounds: No wheezing or rales. Abdominal:      General: There is no distension. Palpations: Abdomen is soft. Tenderness: There is no abdominal tenderness. There is no guarding. Musculoskeletal:      Cervical back: Neck supple. No tenderness. Right lower leg: Edema present. Left lower leg: Edema present. Comments: Chronic venous changes on b/l LE, mild tenderness, significant swelling. B/L feet with wounds and post-surgical changes, not purulent    Skin:     General: Skin is dry. Capillary Refill: Capillary refill takes less than 2 seconds. Neurological:      General: No focal deficit present. Mental Status: She is alert and oriented to person, place, and time. LABS:    CBC:   Recent Labs     10/11/21  0521 10/12/21  0525 10/13/21  0506   WBC 7.3 7.7 8.1   HGB 8.8* 9.1* 9.1*   HCT 26.8* 27.7* 28.1*    324 319   MCV 85.4 85.6 85.5     Renal:    Recent Labs     10/11/21  0521 10/12/21  0432 10/13/21  0506    139 140   K 5.3* 5.7* 5.3*    105 105   CO2 26 27 26   BUN 43* 40* 39*   CREATININE 2.0* 1.7* 1.7*   GLUCOSE 78 76 52*   CALCIUM 8.6 9.2 9.1   MG 2.00 2.10 2.10   ANIONGAP 6 7 9     Hepatic:   No results for input(s): AST, ALT, BILITOT, BILIDIR, PROT, LABALBU, ALKPHOS in the last 72 hours. Troponin:   No results for input(s): TROPONINI in the last 72 hours. BNP: No results for input(s): BNP in the last 72 hours. Lipids: No results for input(s): CHOL, HDL in the last 72 hours. Invalid input(s): LDLCALCU, TRIGLYCERIDE  ABGs:  No results for input(s): PHART, HEQ3NER, PO2ART, IVX0DGG, BEART, THGBART, K6CLJBYJ, ZOE5LMW in the last 72 hours.     INR:   Recent Labs     10/11/21  1848   INR 0.93     Lactate: No results for input(s): LACTATE in the last 72 hours. Cultures:  -----------------------------------------------------------------  RAD:   XR FOOT RIGHT (2 VIEWS)   Final Result      Expected postsurgical changes of right fourth ray and partial cuboid resection. XR FOOT LEFT (2 VIEWS)   Final Result      Expected postsurgical changes of left fifth ray resection. VL Extremity Venous Bilateral   Final Result      IR TUNNELED CVC PLACE WO SQ PORT/PUMP > 5 YEARS   Final Result   Impression:      Successful placement of a tunneled central venous catheter via the right internal jugular vein. The catheter is ready for immediate use. VL DUP LOWER EXTREMITY ARTERIES BILATERAL   Final Result      MRI FOOT LEFT WO CONTRAST   Final Result   Osteomyelitis involving the fifth metatarsal and fifth proximal phalanx with extensive osseous destruction. Mild osteomyelitis involving the lateral aspect of the cuboid. Prior partial first digit amputation. MRI FOOT RIGHT WO CONTRAST   Final Result   Multiple prior amputations. Soft tissue ulceration lateral to the cuboid with mild acute osteomyelitis involving the lateral base of the fourth metatarsal and more distal plantar aspect of the remaining fourth metatarsal shaft as well as the lateral aspect of the cuboid. XR FOOT LEFT (MIN 3 VIEWS)   Final Result      Numerous osteotomies with soft tissue swelling and possible ulcer lateral aspect of the right midfoot. No plain radiographic evidence for osteomyelitis, however plain film findings for osteomyelitis are often late findings. 3 views left foot      FINDINGS:      There is a mottled appearance of the phalanges of the right fifth digit as well as the fifth metatarsal. The remaining metatarsals unremarkable in appearance. Patient's had prior osteotomy of the first distal phalangeal tuft.       IMPRESSION:      Plain radiographic evidence for osteomyelitis involving the fifth phalanges and fifth metatarsal. Patient's bones are presentation. Suspect from diabetic nephropathy in setting of poorly controlled diabetes. HbA1c 10/2021 at 8.3. Low BP may also be playing a part. - Kidney function remains stable  - Avoid nephrotoxic agents  - Nephrology following     Anemia  Likely 2/2 blood loss in OR in the setting of AOCD from CKD. Iron 16, iron sat 9, transferrin 7.8, TIBC 174. Requiring 4 uRBC transfusion so far. - CBC stable, H/H goal > 7    Chronic diastolic Heart Failure  Last Echo 2018 with EF 50-55%. Evidence of PAH which may be contributing. CXR 10/1 with mild cardiomegaly and interstitial edema. Suspect rt heart failure from undiagnosed sleep apnea. Echo with EF 55%, LVH and estimated pulm artery systolic pressure is at 60 mmHg   - Lasix 20 mg IV daily held for worsening FAHEEM  - strict I/O's, daily weights   - Low salt diet    Hyperkalemia   -Suspect 2/2 to blood transfusions. 5.3 today. - Continue to monitor daily RFP   - [facility]    Hypertension  - Hold parameters placed for low BP. Remained HDS overnight. - Hydralazine 50 mg TID  - Metoprolol 50 mg QD  - Hold cardura for now unless directed otherwise by nephro     Hyponatremia (resolved)  Na 133 on admission.  - continue to monitor      T2DM   BS 91 - recent outpatient visit records show good blood sugar control. HbA1c 6.8 on 06/17/21.   - HbA1c on  10/2021 at 8.3  - on wt loss, exercise, good diet and medication compliance  -lantus 12 units plus HDSS, hypoglycemia protocol     Hx of Opioid Pain Medication Use, Now on Methadone   - Continue Methadone 140 mg daily         Code Status: Full Code   FEN: Diet NPO Exceptions are: Sips of Water with Meds   PPX: heparin TID  DISPO: Samreen Barrow MD, PGY-1  10/13/21  9:39 AM    This patient has been staffed and discussed with Mary Bell MD.

## 2021-10-13 NOTE — PROGRESS NOTES
Medical residents in PACU,  ordered/requested narcan. Informed him anesthesia did not want to give narcan, patient only received fentanyl 100 mcg intraop. Additional medical residents in PACU requested narcan 0.2 mg. Narcan 0.2mg given @ 1720 after repeat ABG's drawn per RT. Patient awakened after narcan and then minutes later began screaming yelling stated \"I hate the way narcan makes me feel I want to die\". BIPAP removed, placed on NC.  called and asked to come to PACU again. Medical residents stated keep patient in PACU for 30 minutes and transfer to PCU.

## 2021-10-13 NOTE — PROGRESS NOTES
Ativan given per order, resident Delroy Mejia called for CIWA orders. Patient not yelling like before but states her body feels weird and grabbing @ things in air and on blanket. Zofran given for c/o nausea. Daughter Teofilo Worthy called. Patient will be transferred to PCU. Nursing supervisor updated as well as 6-SOUTH RN WHO WILL ALSO CALL REPORT AND PACK UP BELONGINGS. PCU nurse called for report.

## 2021-10-13 NOTE — PROGRESS NOTES
Patient remains unresponsive with oral airway after 1 hour. Sternal rub with slight movement, RR 8-10. Does occassionally sigh has not opened eyes @ all.  called and came to PACU. Patient takes methadone 140 mg daily which was held today on floor. Spoke to patient's nurse Zamzam Marti who stated patient has episodes of not waking up on floor. SBP above 100 now, sats 95% on 5 LPNC. Per anesthesia give patient more time. Continue to monitor.

## 2021-10-13 NOTE — PROGRESS NOTES
Patient arrived from OR to PACU # 3 s/p REPEAT INCISION AND DRAINAGE OF ALL NONVIABLE SOFT TISSUE AND BONE/ PARTIAL CUBOID RESECTION/ BONY BIOPSY AS NEEDED/ WOUND VAC RIGHT LOWER EXTREMITY (Bilateral ) per . Attached to PACU monitoring device, report received from CRNA who stated patient was hypotensive which was treated intraop. Difficult to arouse on inpatient floor this am as well. Arrived to PACU sedated with oral airway in &  SBP in 90's where she has been running. Continue to monitor.

## 2021-10-13 NOTE — PLAN OF CARE
Problem: Falls - Risk of:  Goal: Will remain free from falls  Description: Will remain free from falls  Outcome: Ongoing  Note: Discussed with pt importance to keep bed low and locked with alarm activated, nonskid socks on when out of bed, call light and belongings within reach- will monitor. Problem: Falls - Risk of:  Goal: Absence of physical injury  Description: Absence of physical injury  Outcome: Ongoing  Note: No physical injury noted. Problem: Pain:  Goal: Pain level will decrease  Description: Pain level will decrease  Outcome: Ongoing  Note: Pt denies pain at this time. Problem: Skin Integrity:  Goal: Will show no infection signs and symptoms  Description: Will show no infection signs and symptoms  Outcome: Ongoing  Note: Bilateral foot ulcers that podiatry is address and pt having surgery today for both feet. Will monitor. Problem: OXYGENATION/RESPIRATORY FUNCTION  Goal: Patient will maintain patent airway  Outcome: Ongoing  Note: Pt able to maintain patent airway.

## 2021-10-13 NOTE — PROGRESS NOTES
Patient to pacu 10 pre op, blood glucose 58 on the floor and repeat here is 101. Dr Jai Sher made aware. No further orders. Patient arousable on calling, but very drowsy. 3L nasal cannula applied. SPO2 86%.

## 2021-10-13 NOTE — PROGRESS NOTES
in PACU again to see patient. Sternal rubbed patient & she squeezed eyelids together and moaned. Per anesthesia draw ABG's-RT called.

## 2021-10-14 NOTE — PROGRESS NOTES
Patient lethargic, only opening eyes to sternal rub. Respiratory called, pt now on bipap. Resident notified via [employer].  Will cont to monitor

## 2021-10-14 NOTE — PROGRESS NOTES
Podiatric Surgery Daily Progress Note  Trina Cleveland      Subjective :   Patient seen and examined this pm at the bedside. Patient only on 2 L of O2 via nasal cannula and had straight cath placed last night and had good urine output. Review of Systems: As above. Objective     /73   Pulse 68   Temp 98.3 °F (36.8 °C)   Resp 18   Ht 5' 2\" (1.575 m)   Wt 208 lb 15.9 oz (94.8 kg)   LMP 10/23/2015   SpO2 92%   BMI 38.23 kg/m²      I/O:    Intake/Output Summary (Last 24 hours) at 10/14/2021 1044  Last data filed at 10/14/2021 0557  Gross per 24 hour   Intake 700 ml   Output 1025 ml   Net -325 ml              Wt Readings from Last 3 Encounters:   10/14/21 208 lb 15.9 oz (94.8 kg)   08/12/21 200 lb (90.7 kg)   06/18/21 218 lb 0.6 oz (98.9 kg)       LABS:    Recent Labs     10/13/21  0506 10/14/21  0510   WBC 8.1 7.2   HGB 9.1* 7.6*   HCT 28.1* 23.8*    293        Recent Labs     10/14/21  0510      K 6.1*      CO2 27   BUN 37*   CREATININE 1.7*        Recent Labs     10/11/21  1848   INR 0.93           LOWER EXTREMITY EXAMINATION    Dressing to bilateral lower extremity intact. No strikethrough noted to the external dressing. Sensation diminished to digits bilateral.  No pain with calf compression bilateral.  Wound vac noted to right lower extremity, left clean, dry, and intact. Excellent seal noted with suction set to 125 mmHg. 45 cc sanguinous drainage noted to oliviaister. IMAGING:  X-ray right foot 10/7/2021-postop  Narrative   EXAM: XR FOOT RIGHT (2 VIEWS)       INDICATION:  s/p 4th ray resection and partial cuboid resection; packed open       COMPARISON: 10/3/2021, 10/2/2021       FINDINGS:       There are postsurgical changes of fourth ray resection and partial cuboid resection.  Postsurgical changes of prior fifth ray resection and first, second, and third toe resections again seen.           Impression       Expected postsurgical changes of right fourth ray and partial cuboid resection. X-ray left foot 10/7/2021-postop  Narrative   EXAM: XR FOOT LEFT (2 VIEWS)       INDICATION:  s/p 5th ray resection & cuboid bone biopsy; packed open       COMPARISON: 10/3/2021, 10/2/2021       FINDINGS:       There are postsurgical changes of fifth ray resection. There is a small amount of postsurgical subcutaneous gas. Redemonstration of prior amputation of the first digit at the level of the base of the first proximal phalanx again seen.           Impression       Expected postsurgical changes of left fifth ray resection. MRI right foot 10/3/2021  Narrative   EXAMINATION: Magnetic resonance imaging (MRI) of the right foot without contrast       HISTORY: Osteomyelitis 5th metatarsal base; rule out OM cuboid       TECHNIQUE: MRI of the right foot was performed using multiple pulse sequences in multiple planes without contrast.       COMPARISON: Radiograph dated 10/2/2021       FINDINGS:        There have been prior first second third MTP joint, fourth transmetatarsal, and fifth TMT joint amputations. There is a soft tissue ulcer lateral to the base of the fourth metatarsal with adjacent T1 hypointensity in marrow edema involving the base of    the fourth metatarsal as well as the lateral aspect of the cuboid representing osteomyelitis. There is also soft tissue swelling more distally along the plantar aspect of the remaining fourth metatarsal shafts with osteomyelitis along the plantar cortex    of the remaining fourth metatarsal. There is no acute fracture or dislocation. There is a degenerative subchondral cyst in the navicular. No abscess is seen within limits of noncontrast examination. Visualized tendons and plantar fascia are unremarkable.           Impression   Multiple prior amputations.    Soft tissue ulceration lateral to the cuboid with mild acute osteomyelitis involving the lateral base of the fourth metatarsal and more distal plantar aspect of the remaining fourth metatarsal shaft as well as the lateral aspect of the cuboid.        MRI left foot 10/3/2021  Narrative   EXAMINATION: Magnetic resonance imaging (MRI) of the left foot without contrast       HISTORY: Osteomyelitis 5th metatarsal       TECHNIQUE: MRI of the left foot was performed using multiple pulse sequences in multiple planes without contrast.       COMPARISON: Radiograph dated 10/2/2021       FINDINGS:        There has been prior amputation of the first digit at the level of the base of the first proximal phalanx. There is soft tissue ulceration lateral to the fifth mid metatarsal with surrounding edema and phlegmon in the subcutaneous tissues. There is    extensive destruction of the mid to distal aspect of the fifth metatarsal and base of the fifth proximal phalanx representing sequelae of osteomyelitis. There is also mild osteomyelitis extending to the base of the fifth metatarsal as well as along the    lateral aspect of the cuboid.           Impression   Osteomyelitis involving the fifth metatarsal and fifth proximal phalanx with extensive osseous destruction. Mild osteomyelitis involving the lateral aspect of the cuboid. Prior partial first digit amputation.      Lower extremity arterial duplex 10/4/2021-preliminary  Right   Right CRISTIAN was not available due to patient non-compliance . There are multiphasic waveforms in the common femoral artery indicating no   aortoiliac inflow disease. There is atherosclerotic plaque involving the superficial femoral and   popliteal arteries with no significantly elevated velocities. Left   Left CRISTIAN was not available due to patient non-compliance . There are multiphasic waveforms in the common femoral artery indicating no   aortoiliac inflow disease. There is atherosclerotic plaque involving the superficial femoral and   popliteal arteries with no significantly elevated velocities. The peroneal artery is not visualized.    There is no previous exam for comparison.      Xray Right foot s/p repeat I&D (10/13)  Narrative   History: Status post partial cuboid amputation. Right foot infection.       3 views right foot.       FINDINGS: There is a large defect along bilateral foot with wound VAC device. Extensive soft tissue swelling. Resection of much of the cuboid. No acute complication identified.           Impression   1. Partial amputation the region of the cuboid with soft tissue defect and extensive soft tissue swelling. No acute complication identified. ASSESSMENT/PLAN  -S/p bilateral lower extremity I&D with left fifth ray resection, right fourth ray resection, right partial cuboid resection, 10/7/2021  -Diabetic foot ulceration with osteomyelitis, Carpenter 3, right lower extremity  -Diabetic foot ulceration with osteomyelitis, Carpenter 3, left lower extremity  -PVD, bilateral lower extremity  -Edema, bilateral lower extremity  -Diabetes mellitus type 2 with peripheral neuropathy  -History of noncompliance with weightbearing status    -Patient examined and evaluated at the bedside   -VSS on 2 L of O2 via nasal cannula.  No leukocytosis noted. -ESR >120, CRP 205.1  -Imaging reviewed, noted above  -Bone biopsy taken right foot, sent for pathology; right fourth metatarsal shows OM, right cuboid shows OM, right cuboid clearance fragment shows partially treated OM with degenerative changes  -Bone biopsy taken of the left foot shows partially treated OM of the 5th digit, 5th metatarsal with partially treated OM, left cuboid clearance fragment shows degenerative changes with no evidence of OM  -Wound culture right foot: Pseudomonas aeruginosa, E coli, Enterococcus faecalis (10/2)  -Surgical culture (10/13) preliminary results no WBCs organisms seen  -Surgical path, acid-fast culture, fungus culture (10/13) pending   -Dressing to bilateral lower extremity was left clean, dry, and intact  -Continue antibiotics per ID recommendations.  Final ID recs vancomycin and Invanz through 11/26  -Non-weightbearing bilateral lower extremity    DISPO: s/p I&D with partial cuboid resection and application of wound vac to the right  lower extremity and I&D of the left lower extremity with delayed primary closure of the left lower extremity. Patient will be nonweight bearing to bilateral lower extremity. ID following; final ID recs noted above. PT/OT eval placed. No further surgical intervention needed from a podiatry standpoint. Patient ok to discharge from podiatry standpoint pending PT/OT eval, SNF placement, and medical Improvement.      Discussed assessment and plan with Dr. Carlos Eduardo Jacobs DPM.    Rachel Sparks DPM  10/14/21  10:44 AM

## 2021-10-14 NOTE — PROGRESS NOTES
Physical Therapy and Occupational Therapy  No Treatment    Referral received and chart reviewed. Spoke with RN who reports pt on continuous Bipap and not ready for PT/OT at this time. Will try back later this afternoon vs 10/15 as appropriate. RN in agreement.     Minus Swiss, PT #[id number]   Nivia Jules, REILLY, OTR/L

## 2021-10-14 NOTE — PROGRESS NOTES
Progress Note    Admit Date: 10/1/2021  Day: 13  Diet: Adult Oral Nutrition Supplement; Wound Healing Oral Supplement  ADULT DIET; Regular; 3 carb choices (45 gm/meal); Low Sodium (2 gm); Low Potassium (Less than 3000 mg/day)    CC: B/L leg pain for 2-6 weeks    Interval history:   NAONE. Patient was seen and examined this am. She was hypotensive following surgery, somnolent and minimally arousable. ABG at that time showed a respiratory acidosis. BIPAP was ordered. Patient was given ativan and woke up shortly after. COWS protocol was initiated and buprenorphine ordered. She was still confused throughout the night and was retaining urine. Bladder scan showed over 800ml fluid. She was straight cathed. This morning she is more alert and no longer confused. AOx3. She was switched from BIPAP to 2L NC. She reported her post-op foot pain was under control. Denies any CP, SOB, palpitations, c/d or urinary symptoms at this time. HPI:   Janis Mora. Jon Lino is a 59 yo F with PMH CKD4, DVT (2004; on Eqliquis), T2DM c/b b/l lower extremity ulcers (follows with Podiatry outpatient, last visit 9/27/21), HTN, narcotic-dependent chronic pain (on Methadone 140 mg/day) who presented for progressively worsening b/l lower extremity pain for the 2 weeks. Patient states she presented today with pain in legs so severe that it is now inhibiting her ability to ambulate. She endorses that the legs are also more swollen and warm to touch with pain when she touches it. She endorses she tries to eat a healthy diet low in salt, she also denies any major weight fluctuations recently.     On ROS, denies fever/chills, nausea/vomiting, diarrhea/constipation, urinary issues including dysuria, hematuria, or changes to amount or flow of urine. She denies abdominal pain.      On arrival to [facility] ED, VSS. Patient appeared extremely sleepy on physical exam. Chemistry significant for Na 133, BUN 42/Cr 2.5. Pro-BNP elevated 4,733 (baseline 2-3k). Trops 0.04. CBC with Hgb 9.1 Hct 28.5 at baseline. CXR with mild cardiomegaly and interstitial edema.  She was given IV Lasix 40 in the ED and admitted to the floor.        Medications:     Scheduled Meds:   sodium zirconium cyclosilicate  10 g Oral Once    [Held by provider] heparin (porcine)  5,000 Units SubCUTAneous 3 times per day    vancomycin  750 mg IntraVENous Q24H    metoprolol succinate  50 mg Oral Daily    hydrALAZINE  50 mg Oral 3 times per day    lidocaine 1 % injection  5 mL IntraDERmal Once    sodium chloride flush  5-40 mL IntraVENous 2 times per day    meropenem  1,000 mg IntraVENous Q12H    insulin glargine  12 Units SubCUTAneous Nightly    insulin lispro  0-18 Units SubCUTAneous TID WC    insulin lispro  0-9 Units SubCUTAneous Nightly    fluconazole  200 mg IntraVENous Q24H    amitriptyline  100 mg Oral Nightly    aspirin EC  81 mg Oral Daily    atorvastatin  20 mg Oral Nightly    escitalopram  10 mg Oral Daily    gabapentin  400 mg Oral BID    methadone  140 mg Oral Daily    pantoprazole  40 mg Oral Daily    sodium chloride flush  5-40 mL IntraVENous 2 times per day    influenza virus vaccine  0.5 mL IntraMUSCular Prior to discharge     Continuous Infusions:   dextrose      dextrose      sodium chloride      sodium chloride Stopped (10/10/21 2136)    sodium chloride      dextrose      sodium chloride 25 mL (10/14/21 8817)     PRN Meds:glucose, dextrose, glucagon (rDNA), dextrose, LORazepam, buprenorphine, dextrose, dextrose, hydrALAZINE, sodium chloride, sodium chloride flush, sodium chloride, labetalol, sodium chloride, glucose, dextrose, glucagon (rDNA), dextrose, albuterol, sodium chloride flush, sodium chloride, ondansetron **OR** ondansetron, polyethylene glycol, acetaminophen **OR** acetaminophen    Objective:   Vitals:   T-max:  Patient Vitals for the past 8 hrs:   BP Temp Temp src Pulse Resp SpO2 Weight   10/14/21 0917  98.3 °F (36.8 °C)        10/14/21 0852  Kelsi Mckeon   18 92 %    10/14/21 0600       208 lb 15.9 oz (94.8 kg)   10/14/21 0559      100 %    10/14/21 0352 138/73 98.2 °F (36.8 °C) Axillary 68 24 100 %    10/14/21 0340     24         Intake/Output Summary (Last 24 hours) at 10/14/2021 1028  Last data filed at 10/14/2021 0557  Gross per 24 hour   Intake 700 ml   Output 1025 ml   Net -325 ml       Physical Exam  Physical Exam  Constitutional:       General: She is not in acute distress. HENT:      Head: Normocephalic and atraumatic. Nose: No congestion or rhinorrhea. Mouth/Throat:      Mouth: Mucous membranes are moist.      Pharynx: No oropharyngeal exudate or posterior oropharyngeal erythema. Eyes:      General: No scleral icterus. Conjunctiva/sclera: Conjunctivae normal.   Cardiovascular:      Rate and Rhythm: Normal rate and regular rhythm. Heart sounds: No murmur heard. No gallop. Pulmonary:      Effort: No respiratory distress. Breath sounds: No wheezing or rales. Abdominal:      General: There is no distension. Palpations: Abdomen is soft. Tenderness: There is no abdominal tenderness. There is no guarding. Musculoskeletal:      Cervical back: Neck supple. No tenderness. Right lower leg: Edema present. Left lower leg: Edema present. Comments: Chronic venous changes on b/l LE, mild tenderness, significant swelling. B/L feet with wounds and post-surgical changes, not purulent    Skin:     General: Skin is dry. Capillary Refill: Capillary refill takes less than 2 seconds. Neurological:      General: No focal deficit present. Mental Status: She is alert and oriented to person, place, and time.            LABS:    CBC:   Recent Labs     10/12/21  0525 10/13/21  0506 10/14/21  0510   WBC 7.7 8.1 7.2   HGB 9.1* 9.1* 7.6*   HCT 27.7* 28.1* 23.8*    319 293   MCV 85.6 85.5 86.9     Renal:    Recent Labs     10/12/21  0432 10/13/21  0506 10/14/21  0510    140 140   K 5.7* 5. 3* 6.1*    105 107   CO2 27 26 27   BUN 40* 39* 37*   CREATININE 1.7* 1.7* 1.7*   GLUCOSE 76 52* 95   CALCIUM 9.2 9.1 8.5   MG 2.10 2.10 2.00   ANIONGAP 7 9 6     Hepatic:   No results for input(s): AST, ALT, BILITOT, BILIDIR, PROT, LABALBU, ALKPHOS in the last 72 hours. Troponin:   No results for input(s): TROPONINI in the last 72 hours. BNP: No results for input(s): BNP in the last 72 hours. Lipids: No results for input(s): CHOL, HDL in the last 72 hours. Invalid input(s): LDLCALCU, TRIGLYCERIDE  ABGs:    Recent Labs     10/13/21  1521 10/13/21  2311   PHART 7.263* 7.304*   VUC3TBU 64.0* 56.7*   PO2ART 53.5* 125.0*   MQO0KQH 29 28   BEART 2.0 1.6   [id number] 85* 100   [id number] 31 30       INR:   Recent Labs     10/11/21  1848   INR 0.93     Lactate: No results for input(s): LACTATE in the last 72 hours. Cultures:  -----------------------------------------------------------------  RAD:   XR FOOT RIGHT (MIN 3 VIEWS)   Final Result   1. Partial amputation the region of the cuboid with soft tissue defect and extensive soft tissue swelling. No acute complication identified. XR FOOT RIGHT (2 VIEWS)   Final Result      Expected postsurgical changes of right fourth ray and partial cuboid resection. XR FOOT LEFT (2 VIEWS)   Final Result      Expected postsurgical changes of left fifth ray resection. VL Extremity Venous Bilateral   Final Result      IR TUNNELED CVC PLACE WO SQ PORT/PUMP > 5 YEARS   Final Result   Impression:      Successful placement of a tunneled central venous catheter via the right internal jugular vein. The catheter is ready for immediate use. VL DUP LOWER EXTREMITY ARTERIES BILATERAL   Final Result      MRI FOOT LEFT WO CONTRAST   Final Result   Osteomyelitis involving the fifth metatarsal and fifth proximal phalanx with extensive osseous destruction. Mild osteomyelitis involving the lateral aspect of the cuboid. Prior partial first digit amputation.       MRI FOOT RIGHT WO CONTRAST   Final Result   Multiple prior amputations. Soft tissue ulceration lateral to the cuboid with mild acute osteomyelitis involving the lateral base of the fourth metatarsal and more distal plantar aspect of the remaining fourth metatarsal shaft as well as the lateral aspect of the cuboid. XR FOOT LEFT (MIN 3 VIEWS)   Final Result      Numerous osteotomies with soft tissue swelling and possible ulcer lateral aspect of the right midfoot. No plain radiographic evidence for osteomyelitis, however plain film findings for osteomyelitis are often late findings. 3 views left foot      FINDINGS:      There is a mottled appearance of the phalanges of the right fifth digit as well as the fifth metatarsal. The remaining metatarsals unremarkable in appearance. Patient's had prior osteotomy of the first distal phalangeal tuft. IMPRESSION:      Plain radiographic evidence for osteomyelitis involving the fifth phalanges and fifth metatarsal. Patient's bones are osteopenic. XR FOOT RIGHT (MIN 3 VIEWS)   Final Result      Numerous osteotomies with soft tissue swelling and possible ulcer lateral aspect of the right midfoot. No plain radiographic evidence for osteomyelitis, however plain film findings for osteomyelitis are often late findings. 3 views left foot      FINDINGS:      There is a mottled appearance of the phalanges of the right fifth digit as well as the fifth metatarsal. The remaining metatarsals unremarkable in appearance. Patient's had prior osteotomy of the first distal phalangeal tuft. IMPRESSION:      Plain radiographic evidence for osteomyelitis involving the fifth phalanges and fifth metatarsal. Patient's bones are osteopenic. XR CHEST PORTABLE   Final Result      Mild cardiomegaly and mild interstitial edema. Assessment/Plan:      [facility] 59 [facility] with PMH CKD4, DVT (on Eqliquis), T2DM c/b b/l lower extremity ulcers (follows with Podiatry outpatient, last visit 9/27/21), HTN, history of pain medication addiction (on Methadone 140 mg/day) who presented for progressively worsening b/l lower extremity pain for the 2 weeks. Osteomyelitis of b/l feet  Recently debrided outpatient on 9/27/21, do not appear acutely infected. Patient on Clinda and Cipro. XR of both feet show osteomyelitis involving 5th phalanges and fifth metatarsal. S/P bilateral lower extremity I&D with fifth ray resection left foot, fourth ray resection right foot, partial cuboid resection right foot. - Patient went to OR yesterday for repeat I&D with delayed primary closure of right lower extremity and I&D with partial cuboid resection and wound vac application.   - Surgical path overall showed partially treated osteomyelitis  - Currently on vanc, merrem and fluconazole  - Will need IV vanc and ertapenem (until 11/26) at discharge per ID  - Podiatry following; wound care per Podiatry  - Wound cultures grew pseudomonas, E coli and enterococcus    FAHEEM on CKD 4 (improving)  Baseline creat is appears around 2. Hypoalbuminemia, Albumin 2.6 on presentation. Suspect from diabetic nephropathy in setting of poorly controlled diabetes. HbA1c 10/2021 at 8.3. Low BP may also be playing a part. - Kidney function remains stable  - Avoid nephrotoxic agents  - Nephrology following  - Hold cardura and lasix per nephro for now     Anemia  Likely 2/2 blood loss in OR in the setting of AOCD from CKD. Iron 16, iron sat 9, transferrin 7.8, TIBC 174. Requiring 4 uRBC transfusion so far. - Monitor CBC daily, H/H goal > 7  - Drop in Hgb after surgery by 1.5     Chronic diastolic Heart Failure  Last Echo 2018 with EF 50-55%. Evidence of PAH which may be contributing. CXR 10/1 with mild cardiomegaly and interstitial edema. Suspect rt heart failure from undiagnosed sleep apnea.  Echo with EF 55%, LVH and estimated pulm artery systolic pressure is at 60 mmHg   - Lasix 20 mg IV daily held for worsening FAHEEM  - strict I/O's, daily weights   - Low salt diet    Hyperkalemia   -K 6.1 today. - Ordered stat EKG, lokelma, lasix, calcium gluconate and 10 units of insulin with D50.   - Repeat K later this morning  - Continue to monitor daily RFP     Hypertension  - Hold parameters placed given hypotension in the past  - Hydralazine 50 mg TID  - Metoprolol 50 mg QD  - Hold cardura for now unless directed otherwise by nephro     Hyponatremia (resolved)  Na 133 on admission.  - continue to monitor      T2DM   BS 91 - recent outpatient visit records show good blood sugar control. HbA1c on  10/2021 at 8.3.  - Counseled on wt loss, exercise, good diet and medication compliance  - Continue lantus 12 units plus HDSS, hypoglycemia protocol     Hx of Opioid Pain Medication Use, Now on Methadone   - Continue Methadone 140 mg daily         Code Status: Full Code   FEN: Adult Oral Nutrition Supplement; Wound Healing Oral Supplement  ADULT DIET; Regular; 3 carb choices (45 gm/meal); Low Sodium (2 gm);  Low Potassium (Less than 3000 mg/day)   PPX: heparin TID held  DISPO: Possible discharge tomorrow, pending PT/OT lorri Ball MD, PGY-1  10/14/21  10:28 AM    This patient has been staffed and discussed with Arnold Sommers MD.

## 2021-10-14 NOTE — PLAN OF CARE
Problem: Falls - Risk of:  Goal: Will remain free from falls  Description: Will remain free from falls  10/14/2021 1652 by Tammie Hsieh RN  Note: Pt is a Fall Risk. See Rob Soto Fall Risk Score. Pt bed in low position and side rails up. Call light and belongings in reach. Pt encouraged to call for assistance. Will continue with hourly rounds for PO intake, pain needs, toileting, and repositioning as needed. Problem: OXYGENATION/RESPIRATORY FUNCTION  Goal: Patient will maintain patent airway  10/14/2021 1652 by Tammie Hsieh RN  Note: Patient lethargic, continuous Bipap ordered. SpO2 >90%.  Will cont to monitor

## 2021-10-14 NOTE — PROGRESS NOTES
ID Follow-up NOTE    CC:   Diabetic foot ulcer / infection  Antibiotics: Vancomycin, Meropenem    Admit Date: 10/1/2021  Hospital Day: 14    Subjective:     POD#1   - L foot - I&D, closure   - R foot - I&D, further cuboid resection, VAC placed  Postop hypoventilation, admit to PCU    Awake, alert, conversant       Objective:     Patient Vitals for the past 8 hrs:   BP Temp Temp src Pulse Resp SpO2 Weight   10/14/21 0917  98.3 °F (36.8 °C)        10/14/21 0852     18 92 %    10/14/21 0600       208 lb 15.9 oz (94.8 kg)   10/14/21 0559      100 %    10/14/21 0352 138/73 98.2 °F (36.8 °C) Axillary 68 24 100 %    10/14/21 0340     24       I/O last 3 completed shifts: In: 700 [I.V.:700]  Out: 1325 [Urine:1300; Drains:25]  No intake/output data recorded. EXAM:  GENERAL: No apparent distress.   2L  HEENT: Membranes moist, no oral lesion  NECK:  Supple, no lymphadenopathy  LUNGS: Clear b/l, no rales, no dullness  CARDIAC: RRR, no murmur appreciated  ABD:  Obese, + BS, soft / NT  EXT:  LE venous stasis, b/l foot dressing / ACE in place / R foot VAC  NEURO: No focal neurologic findings  PSYCH: Orientation, sensorium, mood normal  LINES:  R chest tunnel venous line in place       Data Review:  Lab Results   Component Value Date    WBC 7.2 10/14/2021    HGB 7.6 (L) 10/14/2021    HCT 23.8 (L) 10/14/2021    MCV 86.9 10/14/2021     10/14/2021     Lab Results   Component Value Date    CREATININE 1.7 (H) 10/14/2021    BUN 37 (H) 10/14/2021     10/14/2021    K 6.1 (HH) 10/14/2021     10/14/2021    CO2 27 10/14/2021       Hepatic Function Panel:   Lab Results   Component Value Date    ALKPHOS 131 10/01/2021    ALT 13 10/01/2021    AST 16 10/01/2021    PROT 6.9 10/01/2021    PROT 6.6 07/21/2011    BILITOT <0.2 10/01/2021    BILIDIR <0.2 10/01/2021    IBILI see below 10/01/2021    LABALBU 2.6 10/01/2021       MICRO:  10/7 Surg cult: Gs 1+WBC, no organism; cult rare Ps aeruginosa, rare mixed skin sukhjinder  Antibiotic Interpretation ISAAC   cefepime Sensitive 8 mcg/mL   ciprofloxacin Resistant >2 mcg/mL   gentamicin Sensitive <=4 mcg/mL   meropenem Sensitive <=1 mcg/mL   piperacillin-tazobactam Sensitive <=16 mcg/mL   tobramycin Sensitive <=4 mcg/mL     10/2 Wound (not know if L or R): light Ps aeruginosa (R cipro), light 2nd E coli, light Enterococcus faecalis  Pseudomonas aeruginosa   Antibiotic Interpretation ISAAC   cefepime Sensitive 8 mcg/mL   ciprofloxacin Resistant >2 mcg/mL   gentamicin Sensitive <=4 mcg/mL   meropenem Sensitive <=1 mcg/mL   piperacillin-tazobactam Sensitive <=16 mcg/mL   tobramycin Sensitive <=4 mcg/mL     Escherichia coli   Antibiotic Interpretation ISAAC   amoxicillin-clavulanate Intermediate 16/8 mcg/mL   ampicillin Resistant >16 mcg/mL   ceFAZolin Resistant >16 mcg/mL   cefepime Sensitive <=2 mcg/mL   cefTRIAXone Sensitive <=1 mcg/mL   cefuroxime Sensitive 8 mcg/mL   ciprofloxacin Resistant >2 mcg/mL   ertapenem Sensitive <=0.5 mcg/mL   gentamicin Sensitive <=4 mcg/mL   meropenem Sensitive <=1 mcg/mL   piperacillin-tazobactam Sensitive <=16 mcg/mL   trimethoprim-sulfamethoxazole Sensitive <=2/38 mcg/mL     Enterococcus  faecalis   Antibiotic Interpretation ISAAC   ampicillin Sensitive <=2 mcg/mL   vancomycin Sensitive 2 mcg/mL       IMAGING:  10/3 L foot MRI  Impression   Osteomyelitis involving the fifth metatarsal and fifth proximal phalanx with extensive osseous destruction. Mild osteomyelitis involving the lateral aspect of the cuboid. Prior partial first digit amputation     10/3 R foot MRI   Impression   Multiple prior amputations. Soft tissue ulceration lateral to the cuboid with mild acute osteomyelitis involving the lateral base of the fourth metatarsal and more distal plantar aspect of the remaining fourth metatarsal shaft as well as the lateral aspect of the cuboid.        Scheduled Meds:   sodium zirconium cyclosilicate  10 g Oral Once    [Held by provider] heparin (porcine)  5,000 Units SubCUTAneous 3 times per day    vancomycin  750 mg IntraVENous Q24H    metoprolol succinate  50 mg Oral Daily    hydrALAZINE  50 mg Oral 3 times per day    lidocaine 1 % injection  5 mL IntraDERmal Once    sodium chloride flush  5-40 mL IntraVENous 2 times per day    meropenem  1,000 mg IntraVENous Q12H    insulin glargine  12 Units SubCUTAneous Nightly    insulin lispro  0-18 Units SubCUTAneous TID WC    insulin lispro  0-9 Units SubCUTAneous Nightly    fluconazole  200 mg IntraVENous Q24H    amitriptyline  100 mg Oral Nightly    aspirin EC  81 mg Oral Daily    atorvastatin  20 mg Oral Nightly    escitalopram  10 mg Oral Daily    gabapentin  400 mg Oral BID    methadone  140 mg Oral Daily    pantoprazole  40 mg Oral Daily    sodium chloride flush  5-40 mL IntraVENous 2 times per day    influenza virus vaccine  0.5 mL IntraMUSCular Prior to discharge       Continuous Infusions:   dextrose      dextrose      sodium chloride      sodium chloride Stopped (10/10/21 2136)    sodium chloride      dextrose      sodium chloride 25 mL (10/14/21 8750)       PRN Meds:  glucose, dextrose, glucagon (rDNA), dextrose, LORazepam, buprenorphine, dextrose, dextrose, hydrALAZINE, sodium chloride, sodium chloride flush, sodium chloride, labetalol, sodium chloride, glucose, dextrose, glucagon (rDNA), dextrose, albuterol, sodium chloride flush, sodium chloride, ondansetron **OR** ondansetron, polyethylene glycol, acetaminophen **OR** acetaminophen      Assessment:     Obesity (BMI 37), DM, neuropathy, HTN, HL, CKD, chronic pain  Hx DM foot infection / surgeries - has R TMA, L hallux resection    L foot wound infection / osteomyelitis   - MRI with 5th MT + prox 5th phalanx destruction, lateral cuboid edema, reviewed images with Radiologist 10/4   - OR 10/7 - 5th ray resection; path - 'focal subacute, partially treated osteomyelitis' (L 5th MT)    R foot wound infection / osteomyelitis    - MRI with cuboid, 4th MT OM, reviewed images with Radiologist 10/4   - OR 10/7 - R 4th ray, partial cuboid resection - NOT definitive   - Path with 4th MT acute osteomyelitis, R cuboid 'focal acute osteomyelitis', R cuboid clearance frag 'rare focus of acute osteomyeltiis'   - OR 10/13 - L I&D and closure; R I&D, further cuboid resection, VAC    Cult polymicrobial with Ps aerug (R cipro), E coli, E faecalis    FAHEEM, Cr today (1.7)    Plan:     Cont vancomycin, meropenem   Will f/u on OR cult / path from 10/13    See [address]   - will need prolonged course iv antibiotics after discharge (vanco / ertapenem)    Medical Decision Making:   The following items were considered in medical decision making:  Discussion of patient care with other providers  Reviewed clinical lab tests  Reviewed radiology tests  Reviewed other diagnostic tests/interventions  Independent review of radiologic images - reviewed MRI with Radiologist 10/4  Microbiology cultures and other micro tests reviewed      Discussed with pt, Podiatry Resident  Layo Trammell MD    INFUSION ORDERS:  Vancomycin 750 mg iv daily through 11/26  Invanz 1 gm iv daily through 11/26  - Diagnosis - DM foot osteomyelitis   - First dose given in hospital  - PICC   - Disposition / date discharge  - Check CBC w diff, CMP, ESR, CRP, CK every Mon or Tue - FAX result to [phone]  - Call with antibiotic / infusion issues, [phone]  - Call with any change in status, transfer in or out of a facility or to hospital - [phone]  - No f/u in outpatient ID office necessary

## 2021-10-14 NOTE — PROGRESS NOTES
Clinical Pharmacy Progress Note    Vancomycin - Management by Pharmacy    Consult Date(s): 10/2/21  Consulting Provider(s): Dr. Pate Fat / Plan  1) Osteomyelitis of B/L feet - Vancomycin   Concurrent Antimicrobials:  Meropenem + Fluconazole   Day of Vanc Therapy: day #13   Current Dosing Method:  AUC  o Therapeutic Goal: -600mg/L*hr   Current Dose / Frequency / Plan / Rationale:   o On 750 mg IV q24h. o Random level this AM = 15.6 mcg/mL (drawn ~35h after previous dose). SCr stable today (1.7). Bayesian kinetics predicts AUC of 457 mg/L*h and steady state trough of 15.6 mcg/mL.  Clinical condition will be monitored closely, and levels will be ordered as clinically indicated. Please call with questions--  Thanks--  Qi Lobato PharmD, BCPS  Wireless: [id number]   10/14/2021 8:19 AM          Interval Update: Transferred to PCU 10/13 after pt requiring Bipap post-op. Vancomycin dose not given 10/13 - unclear why. Subjective/Objective:   Skip Casarez is a 60 y.o. female with a PMHx significant for CKD4, DVT (2004) on apixaban, DM2, chronic B/L LE ulcers, HTN, and chronic pain on methadone who is admitted with B/L LE osteomyelitis. Pt underwent B/L LE I&D with left 5th ray resection, right 4th ray resection, and rt partial cuboid resection on 10/7. Pharmacy is consulted to dose vancomycin. Current antibiotics:  Fluconazole 200mg IV q24h (10/2-current)  Meropenem 1000mg IV q12h (10/2-current)  Vancomycin - Pharmacy to dose   Intermittent dosing (10/2-10/10)   750mg IV q24h (10/10-current)      Ht Readings from Last 1 Encounters:   10/01/21 5' 2\" (1.575 m)       Wt Readings from Last 1 Encounters:   10/14/21 208 lb 15.9 oz (94.8 kg)     Vancomycin Level(s) / Doses:    Date Time Dose Level / Type of Level Interpretation   10/2 1401 2250 mg IV x1     10/3 0712   Random = 24.5 mcg/mL Drawn ~19 hrs after previous dose given. Hold dose.    10/4 0829  Random =18.1 mcg/mL  Re-dose 500 mg IV x1    1231 500 mg IV x1     10/5 0307  Random = 18.8 mcg/mL Drawn 14.5 hrs after dose    1231 500 mg IV x1  Re-dose with 500 mg IV x 1   10/6 0849  Random = 18.6 mcg/mL Drawn 18h after dose. Will re-dose 500 mg IV x1    1511 500 mg IV x 1     10/7 0520  Random = 15.6 mcg/mL Switch to AUC-based dosing. Schedule vancomycin 750 mg IV Q24 hours and re-assess random 10/8.    1705 750 mg IV x 1     10/8 0454  Random = 18.6 mcg/mL Continue current regimen of 750 mg IV Q24h. Predicted to achieve AUC of 538 mg/L*hr.   10/8 15:25 750mg IV x1     10/9 14:18  Random = 18.6 mcg/mL Drawn ~24 hrs after previous dose. 10/9 16:40 500mg IV x1     10/10 16:58 750mg IV x1     10/11 05:21 750mg IV q24h Random = 23.6 mcg/mL Drawn ~12 hr after prior dose given, predicted  on 750mg q24h   10/14 05:10 750mg IV q24h 15.8 mcg/mL - Random · Drawn ~36h after previous dose (dose not given 10/13)  · Predicted  mg/L*h             Cultures & Sensitivities:  Date Site Micro Susceptibility / Result   10/2 Foot tissue, right Pseudomonas aeruginosa     S: cefepime, gent, meropenem, Zosyn, tobramycin  R: Cipro     Foot tissue, right E. coli  S: Cefepime, ceftriaxone, cefuroxime, ertapenem, gent, meropenem, Zosyn, Bactrim  R: Cipro    Foot tissue, right Enterococcus faecalis S: Ampicillin, Vancomycin      Foot tissue, right Providencia stuartii No further w/u   10/2 Foot wound, unclear R/L Pseudomonas aeruginosa Same as above    Foot wound, unclear R/L E.coli Same as above    Foot wound, unclear R/L Enterococcus faecalis Same as above   10/7 Tissue foot, right Pseudomonas aeruginosa S: cefepime, gent, tobramycin, meropenem, Zoysn  R: cipro     Labs / Ancillary Data:    Estimated Creatinine Clearance: 39 mL/min (A) (based on SCr of 1.7 mg/dL (H)).     Recent Labs     10/12/21  0432 10/12/21  0525 10/13/21  0506 10/14/21  0510   CREATININE 1.7*  --  1.7* 1.7*   BUN 40*  --  39* 37*   WBC  --  7.7 8.1 7.2

## 2021-10-14 NOTE — PLAN OF CARE
Problem: Nutrition  Goal: Optimal nutrition therapy  Outcome: Ongoing  Note: Nutrition Problem #1: Increased nutrient needs  Intervention: Food and/or Nutrient Delivery: Continue Current Diet, Modify Oral Nutrition Supplement  Nutritional Goals: pt will consume greater than 50% of meals and supplements offered through admission to promote meeting increased nutrient needs for wound healing

## 2021-10-14 NOTE — PROGRESS NOTES
Pt had zero output during the shift. Bladder scanned the pt and saw over 800mL of fluid. Resident notified and put in orders for straight cath. Pt had 1000mL of output. Pt is awake and alert.

## 2021-10-14 NOTE — PROGRESS NOTES
Office : [phone]     Fax :[phone]       Nephrology progress  Note      Patient's Name: Abraham Garcia    Reason for Consult:  FAHEEM on CKD 4       Requesting Physician:  Edvin Kelley MD      Chief Complaint:    Chief Complaint   Patient presents with    Leg Pain     x 2 weeks. History of Present iIlness:    Abraham Garcia is a 60 y.o. female with past medical h/o CKD4, DVT, T2DM c/b b/l lower extremity ulcers  who presented for progressively worsening b/l lower extremity pain for the 2 weeks. Patient states she presented today with pain in legs so severe that it is now inhibiting her ability to ambulate. She endorses that the legs are also more swollen and warm to touch with pain when she touches it. She endorses she tries to eat a healthy diet low in salt, she also denies any major weight fluctuations recently.     Baseline creat is 2/0   Now elevated at 2.4        Interval hx :    K high     On BIPAP     Cr better   BP better now   No N/V/D     K better   Sx today         I/O last 3 completed shifts:   In: 240 [P.O.:240]  Out: 1 [Urine:1000; Drains:25]    Past Medical History:   Diagnosis Date    Asthma 05/14/2004    Bacterial vaginosis 04/2008    Carpal tunnel syndrome 05/2007    COPD (chronic obstructive pulmonary disease) ([address]     Diabetes mellitus type II 08/2007    10/1/20 pt states does accucheck 2x/day at home    Diabetic neuropathy ([address] 24/8900    Diastolic CHF ([address]     DVT (deep venous thrombosis) ([address] 03/2004    Dyslipidemia 05/2009    Dyspareunia 05/2009    ETOH abuse 03/04/2007    Feet clawing     HTN (hypertension)     Hx of blood clots     Hyperlipidemia     MRSA (methicillin resistant staph aureus) culture positive 11/06/2017; 2017    foot; leg     Neuropathy 2009    polyneuropathy    Pancreatitis 2004    Scalp lesion 2007    Tobacco abuse 2008    Uses walker     Uses wheelchair     also uses walker    Wears dentures        Past Surgical History:   Procedure Laterality Date     SECTION  unknown    FOOT DEBRIDEMENT Right 2019    INCISION AND DRAINAGE WITH APPLICATION OF STRAVIX GRAFT RIGHT FOOT performed by Ab Keita DPM at [address] Right 2019    RIGHT FOOT INCISION AND DRAINAGE WITH STAGING TRANSMETATARSAL AMPUTATION performed by Ab Keita DPM at [address] Right 2019    RIGHT FOOT DEBRIDEMENT INCISION AND DRAINAGE, OPEN DIABETIC FOOT ULCER WITH GRAFT PLACEMENT performed by Ab Keita DPM at [address] Left 10/23/2019    LEFT FOOT INCISION AND DRAINAGE , DEBRIDEMENT OF OPEN WOUND, APPLICATION OF STRAVIX GRAFT performed by Ab Keita DPM at [address] Right 3/29/2021    INCISION AND DRAINAGE, DEBRIDEMENT OF DIABETIC WOUND WITH PLACEMENT OF STRAVIX GRAFT RIGHT FOOT performed by Ab Keita DPM at [address]  10/7/2021    BILATERAL LOWER EXTREMITY INCISION AND DRAINAGE, RIGHT FOOT 4TH RAY RESECTION, LEFT FOOT 5TH RAY RESECTION performed by Ab Keita DPM at [address] Bilateral 10/13/2021    REPEAT INCISION AND DRAINAGE OF ALL NONVIABLE SOFT TISSUE AND BONE/ PARTIAL CUBOID RESECTION/ BONY BIOPSY AS NEEDED/ WOUND VAC RIGHT LOWER EXTREMITY performed by Ab Keita DPM at [address] IR TUNNELED [address] 5 YEARS  10/5/2021    IR TUNNELED CATHETER PLACEMENT GREATER THAN 5 YEARS 10/5/2021 [facility] PROCED[facility]    KNEE SURGERY Left     from falling off ladder -- has screws in place pt report    OTHER SURGICAL HISTORY Left 2016    I & D left foot    OTHER SURGICAL HISTORY Right 10/20/2017    RIGHT GASTROC LENGTHENING ENDOSCOPIC, INJECTION OF AMNI GRAFT    OTHER SURGICAL HISTORY Right 04/26/2018    Diabetic foot ulcer I&D w/ integra graft application    DC DEBRIDEMENT, SKIN, SUB-Q TISSUE,MUSCLE,BONE,=<20 SQ CM Right 8/17/2018    RIGHT FOOT DEBRIDEMENT INCISION AND DRAINAGE, PARTIAL 5TH RAY AMPUTATION performed by Leticia Murray DPM at [address] [facility] / [address]  7/7003    cryotherapy done on lesion    TOE AMPUTATION Left 02/24/2017    AMPUTATION LEFT GREAT TOE                 TONSILLECTOMY         History reviewed. No pertinent family history. reports that she quit smoking about 3 years ago. Her smoking use included cigarettes. She has a 30.00 pack-year smoking history. She has never used smokeless tobacco. She reports that she does not drink alcohol and does not use drugs.         Allergies:  Sulfa antibiotics    Current Medications:    sodium zirconium cyclosilicate (LOKELMA) oral suspension 10 g, Once  glucose (GLUTOSE) 40 % oral gel 15 g, PRN  dextrose 50 % IV solution, PRN  glucagon (rDNA) injection 1 mg, PRN  dextrose 5 % solution, PRN  sodium zirconium cyclosilicate (LOKELMA) oral suspension 5 g, TID  0.9 % sodium chloride infusion, Continuous  LORazepam (ATIVAN) injection 0.5 mg, Q10 Min PRN  buprenorphine (SUBUTEX) SL tablet 2 mg, PRN  dextrose 50 % IV solution, PRN  dextrose 5 % solution, PRN  [Held by provider] heparin (porcine) injection 5,000 Units, 3 times per day  vancomycin (VANCOCIN) 750 mg in dextrose 5 % 250 mL IVPB, Q24H  metoprolol succinate (TOPROL XL) extended release tablet 50 mg, Daily  hydrALAZINE (APRESOLINE) tablet 50 mg, 3 times per day  hydrALAZINE (APRESOLINE) injection 5 mg, Q6H PRN  0.9 % sodium chloride infusion, PRN  lidocaine PF 1 % injection 5 mL, Once  sodium chloride flush 0.9 % injection 5-40 mL, 2 times per day  sodium chloride flush 0.9 % injection 5-40 mL, PRN  0.9 % sodium chloride infusion, PRN  labetalol (NORMODYNE;TRANDATE) injection syringe 10 mg, Q4H PRN  0.9 % sodium chloride infusion, PRN  meropenem (MERREM) 1,000 mg in sodium chloride 0.9 % 100 mL IVPB (mini-bag), Q12H  glucose (GLUTOSE) 40 % oral gel 15 g, PRN  dextrose 50 % IV solution, PRN  glucagon (rDNA) injection 1 mg, PRN  dextrose 5 % solution, PRN  insulin glargine (LANTUS;BASAGLAR) injection pen 12 Units, Nightly  insulin lispro (1 Unit Dial) 0-18 Units, TID WC  insulin lispro (1 Unit Dial) 0-9 Units, Nightly  fluconazole (DIFLUCAN) 200 mg IVPB, Q24H  albuterol (PROVENTIL) nebulizer solution 2.5 mg, Q6H PRN  amitriptyline (ELAVIL) tablet 100 mg, Nightly  aspirin EC tablet 81 mg, Daily  atorvastatin (LIPITOR) tablet 20 mg, Nightly  escitalopram (LEXAPRO) tablet 10 mg, Daily  gabapentin (NEURONTIN) capsule 400 mg, BID  methadone (DOLOPHINE) tablet 140 mg, Daily  pantoprazole (PROTONIX) tablet 40 mg, Daily  sodium chloride flush 0.9 % injection 5-40 mL, PRN  0.9 % sodium chloride infusion, PRN  ondansetron (ZOFRAN-ODT) disintegrating tablet 4 mg, Q8H PRN   Or  ondansetron (ZOFRAN) injection 4 mg, Q6H PRN  polyethylene glycol (GLYCOLAX) packet 17 g, Daily PRN  acetaminophen (TYLENOL) tablet 650 mg, Q6H PRN   Or  acetaminophen (TYLENOL) suppository 650 mg, Q6H PRN  sodium chloride flush 0.9 % injection 5-40 mL, 2 times per day  influenza quadrivalent split vaccine (FLUZONE;FLUARIX;FLULAVAL;AFLURIA) injection 0.5 mL, Prior to discharge        Review of Systems:   14 point ROS obtained but were negative except mentioned in HPI      Physical exam:     Vitals:  /63   Pulse 68   Temp 98 °F (36.7 °C) (Axillary)   Resp 18   Ht 5' 2\" (1.575 m)   Wt 208 lb 15.9 oz (94.8 kg)   LMP 10/23/2015   SpO2 98%   BMI 38.23 kg/m²   Constitutional:  OAA X3 NAD  Skin: no rash, turgor wnl  Heent:  eomi, mmm  Neck: no bruits or jvd noted  Cardiovascular:  S1, S2 without m/r/g  Respiratory: b/L base crackles   Abdomen:  +bs, soft, nt, nd  Ext: +  lower extremity edema  Psychiatric: mood and affect appropriate  Musculoskeletal:  Rom, muscular strength intact    Labs:  CBC:   Recent Labs     10/12/21  0525 10/13/21  0506 10/14/21  0510   WBC 7.7 8.1 7.2   HGB 9.1* 9.1* 7.6*    319 293     BMP:    Recent Labs     10/12/21  0432 10/13/21  0506 10/14/21  0510    140 140   K 5.7* 5.3* 6.1*    105 107   CO2 27 26 27   BUN 40* 39* 37*   CREATININE 1.7* 1.7* 1.7*   GLUCOSE 76 52* 95     Ca/Mg/Phos:   Recent Labs     10/12/21  0432 10/13/21  0506 10/14/21  0510   CALCIUM 9.2 9.1 8.5   MG 2.10 2.10 2.00     Hepatic:   No results for input(s): AST, ALT, ALB, BILITOT, ALKPHOS in the last 72 hours. Troponin:   No results for input(s): TROPONINI in the last 72 hours. BNP: No results for input(s): BNP in the last 72 hours. Lipids: No results for input(s): CHOL, TRIG, HDL, LDLCALC, LABVLDL in the last 72 hours. ABGs:   Recent Labs     10/14/21  1333   PHART 7.242*   PO2ART 36.3*   WXG1MRL 68.5*     INR:   Recent Labs     10/11/21  1848   INR 0.93     UA:  No results for input(s): Tang Alu, GLUCOSEU, BILIRUBINUR, KETUA, SPECGRAV, BLOODU, PHUR, PROTEINU, UROBILINOGEN, NITRU, LEUKOCYTESUR, Kiera Nurse in the last 72 hours. Urine Microscopic:   No results for input(s): LABCAST, BACTERIA, COMU, HYALCAST, WBCUA, RBCUA, EPIU in the last 72 hours. Urine Culture: No results for input(s): LABURIN in the last 72 hours. Urine Chemistry:   No results for input(s): Mj Alley, PROTEINUR, NAUR in the last 72 hours. IMAGING:  XR FOOT RIGHT (MIN 3 VIEWS)   Final Result   1. Partial amputation the region of the cuboid with soft tissue defect and extensive soft tissue swelling. No acute complication identified. XR FOOT RIGHT (2 VIEWS)   Final Result      Expected postsurgical changes of right fourth ray and partial cuboid resection. XR FOOT LEFT (2 VIEWS)   Final Result      Expected postsurgical changes of left fifth ray resection.       VL Extremity Venous Bilateral   Final Result IR TUNNELED CVC PLACE WO SQ PORT/PUMP > 5 YEARS   Final Result   Impression:      Successful placement of a tunneled central venous catheter via the right internal jugular vein. The catheter is ready for immediate use. VL DUP LOWER EXTREMITY ARTERIES BILATERAL   Final Result      MRI FOOT LEFT WO CONTRAST   Final Result   Osteomyelitis involving the fifth metatarsal and fifth proximal phalanx with extensive osseous destruction. Mild osteomyelitis involving the lateral aspect of the cuboid. Prior partial first digit amputation. MRI FOOT RIGHT WO CONTRAST   Final Result   Multiple prior amputations. Soft tissue ulceration lateral to the cuboid with mild acute osteomyelitis involving the lateral base of the fourth metatarsal and more distal plantar aspect of the remaining fourth metatarsal shaft as well as the lateral aspect of the cuboid. XR FOOT LEFT (MIN 3 VIEWS)   Final Result      Numerous osteotomies with soft tissue swelling and possible ulcer lateral aspect of the right midfoot. No plain radiographic evidence for osteomyelitis, however plain film findings for osteomyelitis are often late findings. 3 views left foot      FINDINGS:      There is a mottled appearance of the phalanges of the right fifth digit as well as the fifth metatarsal. The remaining metatarsals unremarkable in appearance. Patient's had prior osteotomy of the first distal phalangeal tuft. IMPRESSION:      Plain radiographic evidence for osteomyelitis involving the fifth phalanges and fifth metatarsal. Patient's bones are osteopenic. XR FOOT RIGHT (MIN 3 VIEWS)   Final Result      Numerous osteotomies with soft tissue swelling and possible ulcer lateral aspect of the right midfoot. No plain radiographic evidence for osteomyelitis, however plain film findings for osteomyelitis are often late findings.             3 views left foot      FINDINGS:      There is a mottled appearance of the phalanges of the right fifth digit as well as the fifth metatarsal. The remaining metatarsals unremarkable in appearance. Patient's had prior osteotomy of the first distal phalangeal tuft. IMPRESSION:      Plain radiographic evidence for osteomyelitis involving the fifth phalanges and fifth metatarsal. Patient's bones are osteopenic. XR CHEST PORTABLE   Final Result      Mild cardiomegaly and mild interstitial edema. Assessment/Plan :      1. Lin onc CKD 4   LIN 2/2 multiple factors   BP better   off cardura   Off  lasix     2. HTN. BP controlled   - on hydralazine    3. Acute on chronic CHF   - stable     4. DM 2. Needs better control     5. Electrolytes. Monitor     Hyperkalemia     - Low K diet   - Lokalema x 1 now    - give lasix 40 mg iv x 1 now    - give IVF NS     6.  Diabetic foot infection with necrotic ulcer   Renal dose abx   ID following       Recommend to dose adjust all medications  based on renal functions  Maintain SBP> 90 mmHg   Daily weights   AVOID NSAIDs  Avoid Nephrotoxins  Monitor Intake/Output  Call if significant decrease in urine output           Thank you for allowing us to participate in care of Lizzy Donaldson         Electronically signed by: Lyn Dotson MD, 10/14/2021, 3:35 PM      [facility] [id number]  Office : [phone]  Fax :[phone]

## 2021-10-14 NOTE — CARE COORDINATION
Case Management Assessment           Daily Note                 Date/ Time of Note: 10/14/2021 10:23 AM         Note completed by: Unruly Mckeon RN    Patient Name: Norman Barnes  YOB: 1965    Diagnosis:Systolic CHF, acute ([facility] [I50.21]  Acute on chronic congestive heart failure, unspecified heart failure type ([facility] [address] [I50.9]  Patient Admission Status: Inpatient    Date of Admission:10/1/2021  6:16 PM Length of Stay: 13 GLOS: GMLOS: 8.3    Current Plan of Care: continuous bipap, wound vac, IV abx  ________________________________________________________________________________________  PT AM-PAC:   / 24 per last evaluation on: PENDING 10/14/21     OT AM-PAC:   / 24 per last evaluation on: PENDING 10/14/21     DME Needs for discharge: TBD  ________________________________________________________________________________________  Discharge Plan: SNF: referrals pending    Tentative discharge date: TBD    Current barriers to discharge: bipap currently, medical stability, accepting SNF    Referrals completed: Not Applicable    Resources/ information provided: Not indicated at this time  ________________________________________________________________________________________  Case Management Notes: CM continues to follow for DC planing and needs, patient and family plan for SNF at DC, referrals pending. Patient currently on continuous bipap and not medically ready for DC at this time. Patient has wound vac in place and remains on IV abx. Plan for long term IV abx at DC per ID, will need PICC prior to DC, currently has a tunneled right IJ for access, SNF unable to accept with IJ. CM will continue to follow for further DC planning. Mickey Almonte and her family were provided with choice of provider; she and her family are in agreement with the discharge plan.     Care Transition Patient: Lianna Mckeon RN  [facility]  Case Management Department  Ph: [phone]  Fax: [phone]

## 2021-10-14 NOTE — PROGRESS NOTES
Office : [phone]     Fax :[phone]       Nephrology progress  Note      Patient's Name: Lizzy Donaldson    Reason for Consult:  FAHEEM on CKD 4       Requesting Physician:  Juliano Delgado MD      Chief Complaint:    Chief Complaint   Patient presents with    Leg Pain     x 2 weeks. History of Present iIlness:    Lizzy Donaldson is a 60 y.o. female with past medical h/o CKD4, DVT, T2DM c/b b/l lower extremity ulcers  who presented for progressively worsening b/l lower extremity pain for the 2 weeks. Patient states she presented today with pain in legs so severe that it is now inhibiting her ability to ambulate. She endorses that the legs are also more swollen and warm to touch with pain when she touches it. She endorses she tries to eat a healthy diet low in salt, she also denies any major weight fluctuations recently.     Baseline creat is 2/0   Now elevated at 2.4        Interval hx :    Cr better   BP better now   No N/V/D     K better   Sx today         I/O last 3 completed shifts:   In: 0 [I.V.:700]  Out: 5 [Urine:300]    Past Medical History:   Diagnosis Date    Asthma 05/14/2004    Bacterial vaginosis 04/2008    Carpal tunnel syndrome 05/2007    COPD (chronic obstructive pulmonary disease) ([address]     Diabetes mellitus type II 08/2007    10/1/20 pt states does accucheck 2x/day at home    Diabetic neuropathy ([address] 58/8125    Diastolic CHF ([address]     DVT (deep venous thrombosis) ([address] 03/2004    Dyslipidemia 05/2009    Dyspareunia 05/2009    ETOH abuse 03/04/2007    Feet clawing     HTN (hypertension)     Hx of blood clots     Hyperlipidemia     MRSA (methicillin resistant staph aureus) culture positive 11/06/2017; 11/17/2017    foot; leg     Neuropathy 2009    polyneuropathy    Pancreatitis 2004    Scalp lesion 2007    Tobacco abuse 2008    Uses walker     Uses wheelchair     also uses walker    Wears dentures        Past Surgical History:   Procedure Laterality Date     SECTION  unknown    FOOT DEBRIDEMENT Right 2019    INCISION AND DRAINAGE WITH APPLICATION OF STRAVIX GRAFT RIGHT FOOT performed by Lynne Moreno DPM at [facility] Right 2019    RIGHT FOOT INCISION AND DRAINAGE WITH STAGING TRANSMETATARSAL AMPUTATION performed by Lynne Moreno DPM at [facility] Right 2019    RIGHT FOOT DEBRIDEMENT INCISION AND DRAINAGE, OPEN DIABETIC FOOT ULCER WITH GRAFT PLACEMENT performed by Lynne Moreno DPM at [facility] [address] Left 10/23/2019    LEFT FOOT INCISION AND DRAINAGE , DEBRIDEMENT OF OPEN WOUND, APPLICATION OF STRAVIX GRAFT performed by Lynne Moreno DPM at [facility] [address] Right 3/29/2021    INCISION AND DRAINAGE, DEBRIDEMENT OF DIABETIC WOUND WITH PLACEMENT OF STRAVIX GRAFT RIGHT FOOT performed by Lynne Moreno DPM at [facility] [address]  10/7/2021    BILATERAL LOWER EXTREMITY INCISION AND DRAINAGE, RIGHT FOOT 4TH RAY RESECTION, LEFT FOOT 5TH RAY RESECTION performed by Lynne Moreno DPM at [facility] IR TUNNELED CATHETER PLACEMENT GREATER THAN 5 YEARS  10/5/2021    IR TUNNELED CATHETER PLACEMENT GREATER THAN 5 YEARS 10/5/2021 [facility] SPECIAL PROCEDURES    KNEE SURGERY Left     from falling off ladder -- has screws in place pt report    OTHER SURGICAL HISTORY Left 2016    I & D left foot    OTHER SURGICAL HISTORY Right 10/20/2017    RIGHT GASTROC LENGTHENING ENDOSCOPIC, INJECTION OF AMNI GRAFT    OTHER SURGICAL HISTORY Right 2018    Diabetic foot ulcer I&D w/ integra graft application    MA DEBRIDEMENT, SKIN, SUB-Q TISSUE,MUSCLE,BONE,=<20 SQ CM Right 2018    RIGHT FOOT DEBRIDEMENT INCISION AND DRAINAGE, PARTIAL 5TH RAY AMPUTATION performed by Vanesa Peña DPM at [address] [facility] / [address]  7/7003    cryotherapy done on lesion    TOE AMPUTATION Left 02/24/2017    AMPUTATION LEFT GREAT TOE                 TONSILLECTOMY         History reviewed. No pertinent family history. reports that she quit smoking about 3 years ago. Her smoking use included cigarettes. She has a 30.00 pack-year smoking history. She has never used smokeless tobacco. She reports that she does not drink alcohol and does not use drugs.         Allergies:  Sulfa antibiotics    Current Medications:    naloxone (NARCAN) 0.4 MG/ML injection,   LORazepam (ATIVAN) injection 0.5 mg, Q10 Min PRN  buprenorphine (SUBUTEX) SL tablet 2 mg, PRN  LORazepam (ATIVAN) 2 MG/ML injection,   dextrose 50 % IV solution, PRN  dextrose 5 % solution, PRN  [Held by provider] heparin (porcine) injection 5,000 Units, 3 times per day  vancomycin (VANCOCIN) 750 mg in dextrose 5 % 250 mL IVPB, Q24H  metoprolol succinate (TOPROL XL) extended release tablet 50 mg, Daily  hydrALAZINE (APRESOLINE) tablet 50 mg, 3 times per day  hydrALAZINE (APRESOLINE) injection 5 mg, Q6H PRN  0.9 % sodium chloride infusion, PRN  lidocaine PF 1 % injection 5 mL, Once  sodium chloride flush 0.9 % injection 5-40 mL, 2 times per day  sodium chloride flush 0.9 % injection 5-40 mL, PRN  0.9 % sodium chloride infusion, PRN  labetalol (NORMODYNE;TRANDATE) injection syringe 10 mg, Q4H PRN  0.9 % sodium chloride infusion, PRN  meropenem (MERREM) 1,000 mg in sodium chloride 0.9 % 100 mL IVPB (mini-bag), Q12H  glucose (GLUTOSE) 40 % oral gel 15 g, PRN  dextrose 50 % IV solution, PRN  glucagon (rDNA) injection 1 mg, PRN  dextrose 5 % solution, PRN  insulin glargine (LANTUS;BASAGLAR) injection pen 12 Units, Nightly  insulin lispro (1 Unit Dial) 0-18 Units, TID WC  insulin lispro (1 Unit Dial) 0-9 Units, Nightly  fluconazole (DIFLUCAN) 200 mg IVPB, Q24H  albuterol (PROVENTIL) nebulizer solution 2.5 mg, Q6H PRN  amitriptyline (ELAVIL) tablet 100 mg, Nightly  aspirin EC tablet 81 mg, Daily  atorvastatin (LIPITOR) tablet 20 mg, Nightly  escitalopram (LEXAPRO) tablet 10 mg, Daily  gabapentin (NEURONTIN) capsule 400 mg, BID  methadone (DOLOPHINE) tablet 140 mg, Daily  pantoprazole (PROTONIX) tablet 40 mg, Daily  sodium chloride flush 0.9 % injection 5-40 mL, PRN  0.9 % sodium chloride infusion, PRN  ondansetron (ZOFRAN-ODT) disintegrating tablet 4 mg, Q8H PRN   Or  ondansetron (ZOFRAN) injection 4 mg, Q6H PRN  polyethylene glycol (GLYCOLAX) packet 17 g, Daily PRN  acetaminophen (TYLENOL) tablet 650 mg, Q6H PRN   Or  acetaminophen (TYLENOL) suppository 650 mg, Q6H PRN  sodium chloride flush 0.9 % injection 5-40 mL, 2 times per day  influenza quadrivalent split vaccine (FLUZONE;FLUARIX;FLULAVAL;AFLURIA) injection 0.5 mL, Prior to discharge        Review of Systems:   14 point ROS obtained but were negative except mentioned in HPI      Physical exam:     Vitals:  BP (!) 128/107   Pulse 66   Temp 97.5 °F (36.4 °C) (Axillary)   Resp 25   Ht 5' 2\" (1.575 m)   Wt 207 lb 3.7 oz (94 kg)   LMP 10/23/2015   SpO2 96%   BMI 37.90 kg/m²   Constitutional:  OAA X3 NAD  Skin: no rash, turgor wnl  Heent:  eomi, mmm  Neck: no bruits or jvd noted  Cardiovascular:  S1, S2 without m/r/g  Respiratory: b/L base crackles   Abdomen:  +bs, soft, nt, nd  Ext: +  lower extremity edema  Psychiatric: mood and affect appropriate  Musculoskeletal:  Rom, muscular strength intact    Labs:  CBC:   Recent Labs     10/11/21  0521 10/12/21  0525 10/13/21  0506   WBC 7.3 7.7 8.1   HGB 8.8* 9.1* 9.1*    324 319     BMP:    Recent Labs     10/11/21  0521 10/12/21  0432 10/13/21  0506    139 140   K 5.3* 5.7* 5.3*    105 105   CO2 26 27 26   BUN 43* 40* 39*   CREATININE 2.0* 1.7* 1.7*   GLUCOSE 78 76 52*     Ca/Mg/Phos:   Recent Labs     10/11/21  0521 10/12/21  0432 10/13/21  0506   CALCIUM 8.6 9.2 9.1   MG 2.00 2.10 2.10     Hepatic:   No results for input(s): AST, ALT, ALB, BILITOT, ALKPHOS in the last 72 hours. Troponin:   No results for input(s): TROPONINI in the last 72 hours. BNP: No results for input(s): BNP in the last 72 hours. Lipids: No results for input(s): CHOL, TRIG, HDL, LDLCALC, LABVLDL in the last 72 hours. ABGs:   Recent Labs     10/13/21  2311   PHART 7.304*   PO2ART 125.0*   [id number] 56.7*     INR:   Recent Labs     10/11/21  1848   INR 0.93     UA:  No results for input(s): Arlin Gables, GLUCOSEU, BILIRUBINUR, KETUA, SPECGRAV, BLOODU, PHUR, PROTEINU, UROBILINOGEN, NITRU, LEUKOCYTESUR, Jason Blind in the last 72 hours. Urine Microscopic:   No results for input(s): LABCAST, BACTERIA, COMU, HYALCAST, WBCUA, RBCUA, EPIU in the last 72 hours. Urine Culture: No results for input(s): LABURIN in the last 72 hours. Urine Chemistry:   No results for input(s): Reynoso Carton, PROTEINUR, NAUR in the last 72 hours. IMAGING:  XR FOOT RIGHT (MIN 3 VIEWS)   Final Result   1. Partial amputation the region of the cuboid with soft tissue defect and extensive soft tissue swelling. No acute complication identified. XR FOOT RIGHT (2 VIEWS)   Final Result      Expected postsurgical changes of right fourth ray and partial cuboid resection. XR FOOT LEFT (2 VIEWS)   Final Result      Expected postsurgical changes of left fifth ray resection. VL Extremity Venous Bilateral   Final Result      IR TUNNELED CVC PLACE WO SQ PORT/PUMP > 5 YEARS   Final Result   Impression:      Successful placement of a tunneled central venous catheter via the right internal jugular vein. The catheter is ready for immediate use. VL DUP LOWER EXTREMITY ARTERIES BILATERAL   Final Result      MRI FOOT LEFT WO CONTRAST   Final Result   Osteomyelitis involving the fifth metatarsal and fifth proximal phalanx with extensive osseous destruction. Mild osteomyelitis involving the lateral aspect of the cuboid.    Prior partial first digit amputation. MRI FOOT RIGHT WO CONTRAST   Final Result   Multiple prior amputations. Soft tissue ulceration lateral to the cuboid with mild acute osteomyelitis involving the lateral base of the fourth metatarsal and more distal plantar aspect of the remaining fourth metatarsal shaft as well as the lateral aspect of the cuboid. XR FOOT LEFT (MIN 3 VIEWS)   Final Result      Numerous osteotomies with soft tissue swelling and possible ulcer lateral aspect of the right midfoot. No plain radiographic evidence for osteomyelitis, however plain film findings for osteomyelitis are often late findings. 3 views left foot      FINDINGS:      There is a mottled appearance of the phalanges of the right fifth digit as well as the fifth metatarsal. The remaining metatarsals unremarkable in appearance. Patient's had prior osteotomy of the first distal phalangeal tuft. IMPRESSION:      Plain radiographic evidence for osteomyelitis involving the fifth phalanges and fifth metatarsal. Patient's bones are osteopenic. XR FOOT RIGHT (MIN 3 VIEWS)   Final Result      Numerous osteotomies with soft tissue swelling and possible ulcer lateral aspect of the right midfoot. No plain radiographic evidence for osteomyelitis, however plain film findings for osteomyelitis are often late findings. 3 views left foot      FINDINGS:      There is a mottled appearance of the phalanges of the right fifth digit as well as the fifth metatarsal. The remaining metatarsals unremarkable in appearance. Patient's had prior osteotomy of the first distal phalangeal tuft. IMPRESSION:      Plain radiographic evidence for osteomyelitis involving the fifth phalanges and fifth metatarsal. Patient's bones are osteopenic. XR CHEST PORTABLE   Final Result      Mild cardiomegaly and mild interstitial edema.              Assessment/Plan :      1. Lin onc CKD 4   LIN 2/2 multiple factors   BP better   off cardura Off  lasix     2. HTN. BP controlled   - on hydralazine    3. Acute on chronic CHF   - stable     4. DM 2. Needs better control     5. Electrolytes. Monitor   - Low K diet   - Lokalema     6.  Diabetic foot infection with necrotic ulcer   Renal dose abx   ID following       Recommend to dose adjust all medications  based on renal functions  Maintain SBP> 90 mmHg   Daily weights   AVOID NSAIDs  Avoid Nephrotoxins  Monitor Intake/Output  Call if significant decrease in urine output           Thank you for allowing us to participate in care of Yinka Letty         Electronically signed by: Oniel Marvin MD, 10/13/2021, 11:50 PM      [facility] [address]  Office : [phone]  Fax :[phone]

## 2021-10-14 NOTE — PROGRESS NOTES
Patient had potassium of 6.1 this am. Calcium gluconate, lasix 40 mg PO, 10 units of insulin with D50 and lokelma were ordered. Patient was not able to received lokelma because of AMS. Repeat K was 6.1. Gave a one dose lasix 10 mg IV. Will repeat K at 23:30.      Kim Toribio MD, PGY-1

## 2021-10-14 NOTE — PROGRESS NOTES
Comprehensive Nutrition Assessment    RECOMMENDATIONS:  1. PO Diet: Continue current CCC/Low Na/Low K+. 2. ONS: Joshua BID; d/c other ONS: monitor need to increase if PO intake < 50% of meals. NUTRITION ASSESSMENT:   Type and Reason for Visit:  Type and Reason for Visit: Reassess   Nutritional summary & status: Nutritionally was doing well until last 24-48 hours w/medical status changes; pt more lethargic, rapid response called 10/13. Placed on Bipap. RD held full re-eval. Will continue to monitor adequacy of PO nutrition r/t improvements to medical status. Patient admitted Osteomyelitis of b/l feet Pt is s/p bilateral lower extremity I&D with left fifth ray resection, right fourth ray resection, right partial cuboid resection, 10/7/2021; OR 10/13 for repeat I&D with delayed primary closure of right lower extremity and I&D with partial cuboid resection and wound vac application.      PMH significant for: CKD4, DVT, T2DM c/b b/l lower extremity ulcers      MALNUTRITION ASSESSMENT  Context of Malnutrition: Acute Illness   Malnutrition Status: No malnutrition    NUTRITION DIAGNOSIS   · Increased nutrient needs related to increase demand for energy/nutrients (protein) as evidenced by wounds    NUTRITION INTERVENTION  Food and/or Nutrient Delivery:  Continue Current Diet, Modify Oral Nutrition Supplement  Nutrition Education/Counseling:  No recommendation at this time   Goals:  pt will consume greater than 50% of meals and supplements offered through admission to promote meeting increased nutrient needs for wound healing       Nutrition Monitoring and Evaluation:   Food/Nutrient Intake Outcomes:  Food and Nutrient Intake, Supplement Intake  Physical Signs/Symptoms Outcomes:  Skin, Biochemical Data, Weight     OBJECTIVE DATA: Significant to nutrition assessment  · Nutrition-Focused Physical Findings: Nutrition Related Findings: lethargic; bipap; lbm last recorded 10/2   · Labs: Reviewed; A1C 8.3 (10/1/21), BS- 85; K+ 6.1,   · Meds: Reviewed; protonix, lasix  · Wounds: Wound Type: Multiple, Wound Vac, Surgical Incision     CURRENT NUTRITION THERAPIES  ADULT DIET; Regular; 3 carb choices (45 gm/meal); Low Sodium (2 gm); Low Potassium (Less than 3000 mg/day)  Adult Oral Nutrition Supplement; Wound Healing Oral Supplement     PO Intake: Average Meal Intake: Unable to assess   PO Supplement Intake:Average Supplements Intake: Unable to assess  IVF: NS@ 100 ml/hr     ANTHROPOMETRICS  Current Height: 5' 2\" (157.5 cm)  Current Weight: 208 lb 15.9 oz (94.8 kg)    Admission weight: 203 lb (92.1 kg) STATED; actual 221 lb   Ideal Body Weight (lbs) (Calculated): 110 lbs (Ideal Body Weight (Kg) (Calculated): 50 kg)  Usual Bodyweight: ~200 lbs per EMR  Weight Changes: wt fluctuations r/t CHF       BMI BMI (Calculated): 38.3    Wt Readings from Last 50 Encounters:   10/03/21 221 lb 12.5 oz (100.6 kg)   08/12/21 200 lb (90.7 kg)   06/18/21 218 lb 0.6 oz (98.9 kg)   06/17/21 203 lb (92.1 kg)   04/12/21 203 lb 6.4 oz (92.3 kg)   03/29/21 200 lb (90.7 kg)   03/24/21 204 lb 4.8 oz (92.7 kg)   03/12/21 198 lb 9.6 oz (90.1 kg)   01/07/21 209 lb (94.8 kg)     COMPARATIVE STANDARDS  Energy (kcal):  4473-8456 (15-18); Weight Used for Energy Requirements:  Current (94.8 kg)     Protein (g):  63-75 (1.25-1.5); Weight Used for Protein Requirements:  Ideal        Fluid (ml/day):  5175-5255; Method Used for Fluid Requirements:  1 ml/kcal      The patient will still be monitored per nutrition standards of care. Consult dietitian if nutrition interventions essential to patient care is needed.      Joseph Metz RD, LD:    [place]: [phone]  Office:  [phone]

## 2021-10-14 NOTE — PLAN OF CARE
Problem: Falls - Risk of:  Goal: Will remain free from falls  Description: Will remain free from falls  Outcome: Met This Shift  Note: No falls noted thus far this shift, bed in lowest position, alarm on, non-skid socks on, call light within reach, hourly checks, safety maintained, will continue to monitor. Call light within reach. Problem: Pain:  Goal: Pain level will decrease  Description: Pain level will decrease  Outcome: Ongoing     Problem: Skin Integrity:  Goal: Will show no infection signs and symptoms  Description: Will show no infection signs and symptoms  Outcome: Ongoing     Problem: OXYGENATION/RESPIRATORY FUNCTION  Goal: Patient will maintain patent airway  Outcome: Ongoing  Note: Pt was placed on Bipap overnight and tolerated well at high 90's. ABG's were drawn after pt was placed on bipap and resulted improved. Residents notified and decided to keep the pt on bipap. Will continue to monitor.       Problem: HEMODYNAMIC STATUS  Goal: Patient has stable vital signs and fluid balance  Outcome: Ongoing

## 2021-10-15 NOTE — PLAN OF CARE
Problem: Pain:  Goal: Pain level will decrease  Description: Pain level will decrease  Outcome: Met This Shift     Problem: Falls - Risk of:  Goal: Will remain free from falls  Description: Will remain free from falls  10/15/2021 0636 by Benny Pérez RN  Outcome: Ongoing  Note: No falls noted thus far this shift, bed in lowest position, alarm on, non-skid socks on, call light within reach, hourly checks, safety maintained, will continue to monitor. Call light within reach.       Problem: Skin Integrity:  Goal: Will show no infection signs and symptoms  Description: Will show no infection signs and symptoms  Outcome: Ongoing     Problem: OXYGENATION/RESPIRATORY FUNCTION  Goal: Patient will maintain patent airway  10/15/2021 0636 by Benny Pérez RN  Outcome: Ongoing     Problem: HEMODYNAMIC STATUS  Goal: Patient has stable vital signs and fluid balance  Outcome: Ongoing

## 2021-10-15 NOTE — PROGRESS NOTES
ID Follow-up NOTE    CC:   Diabetic foot ulcer / infection  Antibiotics: Vancomycin, Meropenem    Admit Date: 10/1/2021  Hospital Day: 15    Subjective:     POD#2   - L foot - I&D, closure   - R foot - I&D, further cuboid resection, VAC placed  Postop hypoventilation, admit to PCU    Awake, alert, conversant   No feet / surg site pain    Objective:     Patient Vitals for the past 8 hrs:   BP Temp Temp src Pulse Resp SpO2   10/15/21 0739  97.6 °F (36.4 °C) Oral      10/15/21 0738 (!) 150/82        10/15/21 0709 (!) 141/70        10/15/21 0430 130/71 97.3 °F (36.3 °C) Oral 64 22 94 %   10/15/21 0100 (!) 112/59   69  97 %     I/O last 3 completed shifts: In: 240 [P.O.:240]  Out: 1300 [Urine:1300]  I/O this shift:  In: 268 [I.V.:268]  Out: 110 [Urine:110]    EXAM:  GENERAL: No apparent distress.   2L  HEENT: Membranes moist, no oral lesion  NECK:  Supple, no lymphadenopathy  LUNGS: Clear b/l, no rales, no dullness  CARDIAC: RRR, no murmur appreciated  ABD:  Obese, + BS, soft / NT  EXT:  LE venous stasis, b/l foot dressing / ACE in place / R foot VAC  NEURO: No focal neurologic findings  PSYCH: Orientation, sensorium, mood normal  LINES:  R chest tunnel venous line in place       Data Review:  Lab Results   Component Value Date    WBC 7.2 10/15/2021    HGB 7.3 (L) 10/15/2021    HCT 22.5 (L) 10/15/2021    MCV 87.1 10/15/2021     10/15/2021     Lab Results   Component Value Date    CREATININE 2.5 (H) 10/15/2021    BUN 41 (H) 10/15/2021     10/15/2021    K 5.3 (H) 10/15/2021     10/15/2021    CO2 25 10/15/2021       Hepatic Function Panel:   Lab Results   Component Value Date    ALKPHOS 131 10/01/2021    ALT 13 10/01/2021    AST 16 10/01/2021    PROT 6.9 10/01/2021    PROT 6.6 07/21/2011    BILITOT <0.2 10/01/2021    BILIDIR <0.2 10/01/2021    IBILI see below 10/01/2021    LABALBU 2.6 10/01/2021       MICRO:  10/13 Surg cult  - no growth to date     10/7 Surg cult: Gs 1+WBC, no organism; cult rare Ps aeruginosa, rare mixed skin sukhjinder  Antibiotic Interpretation ISAAC   cefepime Sensitive 8 mcg/mL   ciprofloxacin Resistant >2 mcg/mL   gentamicin Sensitive <=4 mcg/mL   meropenem Sensitive <=1 mcg/mL   piperacillin-tazobactam Sensitive <=16 mcg/mL   tobramycin Sensitive <=4 mcg/mL     10/2 Wound (not know if L or R): light Ps aeruginosa (R cipro), light 2nd E coli, light Enterococcus faecalis  Pseudomonas aeruginosa   Antibiotic Interpretation ISAAC   cefepime Sensitive 8 mcg/mL   ciprofloxacin Resistant >2 mcg/mL   gentamicin Sensitive <=4 mcg/mL   meropenem Sensitive <=1 mcg/mL   piperacillin-tazobactam Sensitive <=16 mcg/mL   tobramycin Sensitive <=4 mcg/mL     Escherichia coli   Antibiotic Interpretation ISAAC   amoxicillin-clavulanate Intermediate 16/8 mcg/mL   ampicillin Resistant >16 mcg/mL   ceFAZolin Resistant >16 mcg/mL   cefepime Sensitive <=2 mcg/mL   cefTRIAXone Sensitive <=1 mcg/mL   cefuroxime Sensitive 8 mcg/mL   ciprofloxacin Resistant >2 mcg/mL   ertapenem Sensitive <=0.5 mcg/mL   gentamicin Sensitive <=4 mcg/mL   meropenem Sensitive <=1 mcg/mL   piperacillin-tazobactam Sensitive <=16 mcg/mL   trimethoprim-sulfamethoxazole Sensitive <=2/38 mcg/mL     Enterococcus  faecalis   Antibiotic Interpretation ISAAC   ampicillin Sensitive <=2 mcg/mL   vancomycin Sensitive 2 mcg/mL       IMAGING:  10/3 L foot MRI  Impression   Osteomyelitis involving the fifth metatarsal and fifth proximal phalanx with extensive osseous destruction. Mild osteomyelitis involving the lateral aspect of the cuboid. Prior partial first digit amputation     10/3 R foot MRI   Impression   Multiple prior amputations. Soft tissue ulceration lateral to the cuboid with mild acute osteomyelitis involving the lateral base of the fourth metatarsal and more distal plantar aspect of the remaining fourth metatarsal shaft as well as the lateral aspect of the cuboid.        Scheduled Meds:   sodium zirconium cyclosilicate  10 g Oral reviewed images with Radiologist 10/4   - OR 10/7 - 5th ray resection; path - 'focal subacute, partially treated osteomyelitis' (L 5th MT)    R foot wound infection / osteomyelitis    - MRI with cuboid, 4th MT OM, reviewed images with Radiologist 10/4   - OR 10/7 - R 4th ray, partial cuboid resection - NOT definitive   - Path with 4th MT acute osteomyelitis, R cuboid 'focal acute osteomyelitis', R cuboid clearance frag 'rare focus of acute osteomyeltiis'   - OR 10/13 - L I&D and closure; R I&D, further cuboid resection, VAC    Cult polymicrobial with Ps aerug (R cipro), E coli, E faecalis    FAHEEM, Cr today (1.7)    Plan:     Cont vancomycin, meropenem   Will f/u on OR cult (no growth to date) / path from 10/13    See Joan Bradley Gabe   - will need prolonged course iv antibiotics after discharge (vanco / ertapenem)    Medical Decision Making:   The following items were considered in medical decision making:  Discussion of patient care with other providers  Reviewed clinical lab tests  Reviewed radiology tests  Reviewed other diagnostic tests/interventions  Independent review of radiologic images - reviewed MRI with Radiologist 10/4  Microbiology cultures and other micro tests reviewed      Discussed with pt, Podiatry Residents, RN  Roxanne Laird MD    INFUSION ORDERS:  Vancomycin 750 mg iv daily through 11/26  Invanz 1 gm iv daily through 11/26  - Diagnosis - DM foot osteomyelitis   - First dose given in hospital  - PICC   - Disposition / date discharge  - Check CBC w diff, CMP, ESR, CRP, CK every Mon or [place] result to [phone]  - Call with antibiotic / infusion issues, [phone]  - Call with any change in status, transfer in or out of a facility or to hospital - [phone]  - No f/u in outpatient ID office necessary

## 2021-10-15 NOTE — PROGRESS NOTES
Progress Note    Admit Date: 10/1/2021  Day: 14  Diet: ADULT DIET; Regular; 3 carb choices (45 gm/meal); Low Sodium (2 gm); Low Potassium (Less than 3000 mg/day)  Adult Oral Nutrition Supplement; Wound Healing Oral Supplement    CC: B/L leg pain for 2-6 weeks    Interval history:   NAONE. Patient remained HDS and afebrile overnight. Requiring 2L NC this morning. Patient was lethargic yesterday afternoon, de-sating to the 80s and hypotensive. VBG showed acute on chronic respiratory acidosis. She was placed on BIPAP and given IVFs. K in the am was 6.1, orders were places and repeat K was drawn. Repeat K was also 6.1. Patient was not able to get lokelma earlier. Was given another dose of lasix. Repeat K was   , calcium gluconate and and sodium bicarb were given. Today patient is more alert. She is AOx3, denying any CP, SOB, palpitations, abdominal pain or LE pain. Tolerating diet. Garcia in place draining yellow urine. HPI:   Peggy Schuster. [address] is a 61 yo F with PMH CKD4, DVT (2004; on Eqliquis), T2DM c/b b/l lower extremity ulcers (follows with Podiatry outpatient, last visit 9/27/21), HTN, narcotic-dependent chronic pain (on Methadone 140 mg/day) who presented for progressively worsening b/l lower extremity pain for the 2 weeks. Patient states she presented today with pain in legs so severe that it is now inhibiting her ability to ambulate. She endorses that the legs are also more swollen and warm to touch with pain when she touches it. She endorses she tries to eat a healthy diet low in salt, she also denies any major weight fluctuations recently.     On ROS, denies fever/chills, nausea/vomiting, diarrhea/constipation, urinary issues including dysuria, hematuria, or changes to amount or flow of urine. She denies abdominal pain.      On arrival to [facility] ED, VSS. Patient appeared extremely sleepy on physical exam. Chemistry significant for Na 133, BUN 42/Cr 2.5. Pro-BNP elevated 4,733 (baseline 2-3k). Trops 0.04.  CBC with Hgb 9.1 Hct 28.5 at baseline. CXR with mild cardiomegaly and interstitial edema.  She was given IV Lasix 40 in the ED and admitted to the floor.        Medications:     Scheduled Meds:   sodium zirconium cyclosilicate  10 g Oral K6C    vancomycin  750 mg IntraVENous Once    vancomycin (VANCOCIN) intermittent dosing (placeholder)   Other See Admin Instructions    [START ON 10/16/2021] methadone  120 mg Oral Daily    sodium zirconium cyclosilicate  10 g Oral Once    sodium zirconium cyclosilicate  5 g Oral TID    heparin (porcine)  5,000 Units SubCUTAneous 3 times per day    metoprolol succinate  50 mg Oral Daily    hydrALAZINE  50 mg Oral 3 times per day    lidocaine 1 % injection  5 mL IntraDERmal Once    sodium chloride flush  5-40 mL IntraVENous 2 times per day    meropenem  1,000 mg IntraVENous Q12H    insulin glargine  12 Units SubCUTAneous Nightly    insulin lispro  0-18 Units SubCUTAneous TID WC    insulin lispro  0-9 Units SubCUTAneous Nightly    fluconazole  200 mg IntraVENous Q24H    amitriptyline  100 mg Oral Nightly    aspirin EC  81 mg Oral Daily    atorvastatin  20 mg Oral Nightly    escitalopram  10 mg Oral Daily    gabapentin  400 mg Oral BID    pantoprazole  40 mg Oral Daily    sodium chloride flush  5-40 mL IntraVENous 2 times per day    influenza virus vaccine  0.5 mL IntraMUSCular Prior to discharge     Continuous Infusions:   dextrose      sodium chloride 1,000 mL (10/15/21 0439)    dextrose      sodium chloride      sodium chloride Stopped (10/10/21 7526)    sodium chloride      dextrose      sodium chloride 25 mL (10/14/21 8393)     PRN Meds:glucose, dextrose, glucagon (rDNA), dextrose, LORazepam, buprenorphine, dextrose, dextrose, hydrALAZINE, sodium chloride, sodium chloride flush, sodium chloride, labetalol, sodium chloride, glucose, dextrose, glucagon (rDNA), dextrose, albuterol, sodium chloride flush, sodium chloride, ondansetron **OR** ondansetron, polyethylene glycol, acetaminophen **OR** acetaminophen    Objective:   Vitals:   T-max:  Patient Vitals for the past 8 hrs:   BP Temp Temp src Pulse Resp SpO2 Weight   10/15/21 1107 (!) 154/45 96.4 °F (35.8 °C) Oral 69 16 92 %    10/15/21 1106      (!) 76 %    10/15/21 0954       228 lb 2.8 oz (103.5 kg)   10/15/21 0933      95 %    10/15/21 0931      94 %    10/15/21 0904      95 %    10/15/21 0739  97.6 °F (36.4 °C) Oral       10/15/21 0738 (!) 150/82         10/15/21 0709 (!) 141/70         10/15/21 0430 130/71 97.3 °F (36.3 °C) Oral 64 22 94 %        Intake/Output Summary (Last 24 hours) at 10/15/2021 1139  Last data filed at 10/15/2021 1112  Gross per 24 hour   Intake 1045 ml   Output 1860 ml   Net -815 ml       Physical Exam  Physical Exam  Constitutional:       General: She is not in acute distress. HENT:      Head: Normocephalic and atraumatic. Nose: No congestion or rhinorrhea. Mouth/Throat:      Mouth: Mucous membranes are moist.      Pharynx: No oropharyngeal exudate or posterior oropharyngeal erythema. Eyes:      General: No scleral icterus. Conjunctiva/sclera: Conjunctivae normal.   Cardiovascular:      Rate and Rhythm: Normal rate and regular rhythm. Heart sounds: No murmur heard. No gallop. Pulmonary:      Effort: No respiratory distress. Breath sounds: Wheezing present. No rales. Abdominal:      General: There is no distension. Palpations: Abdomen is soft. Tenderness: There is no abdominal tenderness. There is no guarding. Musculoskeletal:      Cervical back: Neck supple. No tenderness. Right lower leg: Edema present. Left lower leg: Edema present. Comments: Chronic venous changes on b/l LE, mild tenderness, significant swelling. B/L feet with wounds and post-surgical changes, not purulent    Skin:     General: Skin is dry. Capillary Refill: Capillary refill takes less than 2 seconds. Neurological:      General: No focal deficit present. Mental Status: She is alert and oriented to person, place, and time. LABS:    CBC:   Recent Labs     10/13/21  0506 10/14/21  0510 10/15/21  0632   WBC 8.1 7.2 7.2   HGB 9.1* 7.6* 7.3*   HCT 28.1* 23.8* 22.5*    293 327   MCV 85.5 86.9 87.1     Renal:    Recent Labs     10/13/21  0506 10/13/21  0506 10/14/21  0510 10/14/21  0510 10/14/21  1624 10/14/21  2334 10/15/21  0632     --  140  --   --   --  139   K 5.3*   < > 6.1*   < > 6.1* 5.6* 5.3*     --  107  --   --   --  105   CO2 26  --  27  --   --   --  25   BUN 39*  --  37*  --   --   --  41*   CREATININE 1.7*  --  1.7*  --   --   --  2.5*   GLUCOSE 52*  --  95  --   --   --  68*   CALCIUM 9.1  --  8.5  --   --   --  8.7   MG 2.10  --  2.00  --   --   --  2.00   ANIONGAP 9  --  6  --   --   --  9    < > = values in this interval not displayed. Hepatic:   No results for input(s): AST, ALT, BILITOT, BILIDIR, PROT, LABALBU, ALKPHOS in the last 72 hours. Troponin:   No results for input(s): TROPONINI in the last 72 hours. BNP: No results for input(s): BNP in the last 72 hours. Lipids: No results for input(s): CHOL, HDL in the last 72 hours. Invalid input(s): LDLCALCU, TRIGLYCERIDE  ABGs:    Recent Labs     10/13/21  1521 10/13/21  2311 10/14/21  1333   PHART 7.263* 7.304* 7.242*   TBR0RMX 64.0* 56.7* 68.5*   PO2ART 53.5* 125.0* 36.3*   DJQ6EJC 29 28 29   BEART 2.0 1.6 1.3   P4VNFVRN 85* 100 63*   LQT1FKE 31 30 32       INR:   No results for input(s): INR in the last 72 hours. Lactate: No results for input(s): LACTATE in the last 72 hours. Cultures:  -----------------------------------------------------------------  RAD:   XR FOOT RIGHT (MIN 3 VIEWS)   Final Result   1. Partial amputation the region of the cuboid with soft tissue defect and extensive soft tissue swelling. No acute complication identified.       XR FOOT RIGHT (2 VIEWS)   Final Result      Expected midfoot. No plain radiographic evidence for osteomyelitis, however plain film findings for osteomyelitis are often late findings. 3 views left foot      FINDINGS:      There is a mottled appearance of the phalanges of the right fifth digit as well as the fifth metatarsal. The remaining metatarsals unremarkable in appearance. Patient's had prior osteotomy of the first distal phalangeal tuft. IMPRESSION:      Plain radiographic evidence for osteomyelitis involving the fifth phalanges and fifth metatarsal. Patient's bones are osteopenic. XR CHEST PORTABLE   Final Result      Mild cardiomegaly and mild interstitial edema. Assessment/Plan:      Avani RaiSherry Rivas is a 61 yo F with PMH CKD4, DVT (on Eqliquis), T2DM c/b b/l lower extremity ulcers (follows with Podiatry outpatient, last visit 9/27/21), HTN, history of pain medication addiction (on Methadone 140 mg/day) who presented for progressively worsening b/l lower extremity pain for the 2 weeks. Osteomyelitis of b/l feet  Recently debrided outpatient on 9/27/21, do not appear acutely infected. Patient on Clinda and Cipro. XR of both feet show osteomyelitis involving 5th phalanges and fifth metatarsal. S/P bilateral lower extremity I&D with fifth ray resection left foot, fourth ray resection right foot, partial cuboid resection right foot. Patient went to OR 10/13 for repeat I&D with delayed primary closure of right lower extremity and I&D with partial cuboid resection and wound vac application.   - Surgical path from 10/7 overall showed partially treated osteomyelitis  - Surgical path from 10/6 pending  - Currently on vanc, merrem and fluconazole  - Will need IV vanc and ertapenem (until 11/26) at discharge per ID  - Podiatry following; wound care per Podiatry  - Wound cultures grew pseudomonas, E coli and enterococcus    FAHEEM on CKD 4  Baseline creat is appears around 2. Hypoalbuminemia, Albumin 2.6 on presentation.  Suspect from diabetic nephropathy in setting of poorly controlled diabetes. HbA1c 10/2021 at 8.3. Low BP may also be playing a part. - Kidney function worsened, likely 2/2 diuretics and hypotension  - Avoid nephrotoxic agents  - Nephrology following  - Hold cardura and lasix per nephro for now  - RFP for 20:00     Anemia  Likely 2/2 blood loss in OR in the setting of AOCD from CKD. Iron 16, iron sat 9, transferrin 7.8, TIBC 174. Requiring 4 uRBC transfusion so far. - Monitor CBC daily, H/H goal > 7  - Drop in Hgb after surgery by 1.5, remaining stable     Chronic diastolic Heart Failure  Last Echo 2018 with EF 50-55%. Evidence of PAH which may be contributing. CXR 10/1 with mild cardiomegaly and interstitial edema. Suspect rt heart failure from undiagnosed sleep apnea. Echo with EF 55%, LVH and estimated pulm artery systolic pressure is at 60 mmHg   - Lasix 20 mg IV daily held for worsening FAHEEM  - strict I/O's, daily weights   - Low salt diet    Hyperkalemia   -K 6.1 twice yesterday. Yesterday we ordered stat EKG, lokelma, lasix, calcium gluconate and 10 units of insulin with D50. Bonnielee Davie was not administered. Another dose of lasix given after K came back at 6.1 again. Repeat K was 5.3. Calcium gluconate and bicarb administered. K is 5.3 today. 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  - continue to monitor      T2DM   BS 91 - recent outpatient visit records show good blood sugar control.  HbA1c on  10/2021 at 8.3.  - Counseled on wt loss, exercise, good diet and medication compliance  - Continue lantus 12 units plus HDSS, hypoglycemia protocol     Hx of Opioid Pain Medication Use, Now on Methadone   Patient seen at [facility] (reached at [phone]) and gets

## 2021-10-15 NOTE — CARE COORDINATION
Case Management Assessment           Daily Note                 Date/ Time of Note: 10/15/2021 11:43 AM         Note completed by: Gely Johnston RN    Patient Name: Shayne Jiménez  YOB: 1965    Diagnosis:Systolic CHF, acute ([facility] [address] [I50.21]  Acute on chronic congestive heart failure, unspecified heart failure type ([facility] [address] [I50.9]  Patient Admission Status: Inpatient    Date of Admission:10/1/2021  6:16 PM Length of Stay: 14 GLOS: GMLOS: 8.3    Current Plan of Care: wound vac, IVF restarted, IV abx  ________________________________________________________________________________________  PT AM-PAC:   / 24 per last evaluation on: on hold 10.15- needs updated for SNF placement    OT AM-PAC:   / 24 per last evaluation on: on hold 10.14 - needs updated for SNF placement    DME Needs for discharge: deferred to SNF  ________________________________________________________________________________________  Discharge Plan: SNF: [facility]  (p- [phone])    Tentative discharge date: 10/16/21     Current barriers to discharge: creatinine elevated, IVF started, medical stability and clearance for DC    Referrals completed: SNF: [facility], [facility], [address], [facility]    Resources/ information provided: SNF List  ________________________________________________________________________________________  Case Management Notes: FAY continues to follow for DC planning and needs. CM spoke with patient at bedside, she reports that she did not choose [facility] for SNF and does not want to go to SNF at [facility] at this time. CM provided update on denials of other SNF's and provided new list of SNF's in-network with her insurance for review. Patient chose above 4 SNF's for review of case and possible admission.  CM made referrals, Sherly Crump ([phone]) with [facility] here to meet with patient, she is able to accept for admission and is aware of tentative plan for DC 10/16/21. Patients creatinine is elevated and she has been started on IVF this AM.     Patient agreeable to SNF at [facility] patient will need updated PT/OT evals, she will need HENS and transport set up by  once ready for DC.  has provided wound vac information to [facility] in order to obtain wound vac for patient upon admission. Patient will need paper prescriptions for her methadone (for pain) and any other controlled meds at DC. Current IV access is fine for use at DC, patient unable to get PICC line due to renal issues. Report: [address] and her family were provided with choice of provider; she and her family are in agreement with the discharge plan.     Care Transition Patient: Lianna Jena RN  [facility]  Case Management Department  Ph: [phone]  Fax: [phone]

## 2021-10-15 NOTE — PROGRESS NOTES
Occupational Therapy   Occupational Therapy Initial Assessment and Tx  Date: 10/15/2021   Patient Name: Sita Ruff  MRN: [id number]     : 1965    Date of Service: 10/15/2021    Discharge Recommendations: Sita Ruff scored a 13/24 on the AM-PAC ADL Inpatient form. Current research shows that an AM-PAC score of 17 or less is typically not associated with a discharge to the patient's home setting. Based on the patient's AM-PAC score and their current ADL deficits, it is recommended that the patient have 3-5 sessions per week of Occupational Therapy at d/c to increase the patient's independence. Please see assessment section for further patient specific details. If patient discharges prior to next session this note will serve as a discharge summary. Please see below for the latest assessment towards goals. OT Equipment Recommendations  Other: defer    Assessment   Performance deficits / Impairments: Decreased functional mobility ; Decreased ADL status; Decreased safe awareness;Decreased cognition;Decreased endurance;Decreased balance;Decreased fine motor control;Decreased coordination;Decreased posture  Assessment: Pt from home with daughters who assist her at baseline. Pt currently requires Jin for supine to sit, ModA rolling L/R in bed, sit balance SBA. Assist with ADLs seated EOB. Pt limited this date by NWB status and lethargy. Pt NWB to bilateral LE. Completing ADLs sitting EOB 20-25 min. Would benefit from cont OT while in acute care and inpt at [place].   Treatment Diagnosis: impaired mobility, transfers, ADL  Decision Making: Medium Complexity  OT Education: OT Role;Plan of Care;ADL Adaptive Strategies;Transfer Training;Energy Conservation  Patient Education: cont to reinforce  Barriers to Learning: cognition, lethargy  REQUIRES OT FOLLOW UP: Yes  Activity Tolerance  Activity Tolerance: Patient Tolerated treatment well;Treatment limited secondary to medical complications (free text)  Activity Tolerance: limited by NWB status  Safety Devices  Safety Devices in place: Yes  Type of devices: All fall risk precautions in place; Left in bed;Bed alarm in place;Call light within reach;Nurse notified           Patient Diagnosis(es): The encounter diagnosis was Acute on chronic congestive heart failure, unspecified heart failure type ([facility] [address] has a past medical history of Asthma, Bacterial vaginosis, Carpal tunnel syndrome, COPD (chronic obstructive pulmonary disease) ([facility] [address] Diabetes mellitus type II, Diabetic neuropathy ([facility] [address] Diastolic CHF ([facility] [address] [facility] Dyslipidemia, Dyspareunia, ETOH abuse, Feet clawing, HTN (hypertension), Hx of blood clots, Hyperlipidemia, MRSA (methicillin resistant staph aureus) culture positive, Neuropathy, Pancreatitis, Scalp lesion, Tobacco abuse, Uses walker, Uses wheelchair, and Wears dentures. has a past surgical history that includes  section (unknown); pre-malignant / benign skin lesion excision (7003); other surgical history (Left, 2016); Toe amputation (Left, 2017); other surgical history (Right, 10/20/2017); other surgical history (Right, 2018); pr debridement, skin, sub-q tissue,muscle,bone,=<20 sq cm (Right, 2018); Foot Debridement (Right, 2019); Foot Debridement (Right, 2019); Foot Debridement (Right, 2019); Foot Debridement (Left, 10/23/2019); Tonsillectomy; knee surgery (Left); Foot Debridement (Right, 3/29/2021); IR TUNNELED CVC PLACE WO SQ PORT/PUMP > 5 YEARS (10/5/2021); Foot Debridement (10/7/2021); and Foot Debridement (Bilateral, 10/13/2021).     Treatment Diagnosis: impaired mobility, transfers, ADL      Restrictions  Restrictions/Precautions  Restrictions/Precautions: Weight Bearing, Fall Risk (NWB B LE, high fall risk)  Lower Extremity Weight Bearing Restrictions  Right Lower Extremity Weight Bearing: Non Weight Bearing  Left Lower Extremity Weight Bearing: Non Weight Bearing  Position Activity Restriction  Other position/activity restrictions: Up with assist, NWB bilat leg    Subjective   General  Chart Reviewed: Yes  Additional Pertinent Hx: REPEAT INCISION AND DRAINAGE OF ALL NONVIABLE SOFT TISSUE AND BONE/ PARTIAL CUBOID RESECTION/ BONY BIOPSY AS NEEDED/ WOUND VAC RIGHT LOWER EXTREMITY (Bilateral ). 60 y.o. female with a PMHx significant for CKD4, DVT (2004) on apixaban, DM2, chronic B/L LE ulcers, HTN, and chronic pain on methadone who is admitted with B/L LE osteomyelitis. Pt underwent B/L LE I&D with left 5th ray resection, right 4th ray resection, and rt partial cuboid resection on 10/7. Referring Practitioner: Jack Ambrose DPM  Diagnosis: REPEAT INCISION AND DRAINAGE OF ALL NONVIABLE SOFT TISSUE AND BONE/ PARTIAL CUBOID RESECTION/ BONY BIOPSY AS NEEDED/ WOUND VAC RIGHT LOWER EXTREMITY (Bilateral ); OSTEOMYELITIS 4TH RIGHT TOE & 5TH LEFT TOE  Subjective  Subjective:  In bed eating lunch upon arrival, agreeable to session  Patient Currently in Pain: Denies  Pain Assessment  Pain Assessment: 0-10  Pain Level: 0  Vital Signs  Temp: 96 °F (35.6 °C)  Temp Source: Axillary  Pulse: 66  Heart Rate Source: Monitor;Telemetry  Resp: 16  BP: 133/84  BP Location: Right upper arm  MAP (mmHg): 94  Patient Position: Right side  Level of Consciousness: Alert (0)  MEWS Score: 1  Patient Currently in Pain: Denies  Oxygen Therapy  SpO2: 93 %  Pulse Oximeter Device Mode: Continuous  O2 Device: Nasal cannula  O2 Flow Rate (L/min): 1 L/min  Social/Functional History  Social/Functional History  Lives With: Family (2 daughters (25, 22))  Type of Home: House  Home Layout: Two level, Able to Live on Main level with bedroom/bathroom  Home Access: Stairs to enter without rails  Entrance Stairs - Number of Steps: 4+ 2 + 1  Bathroom Shower/Tub: Tub/Shower unit  Bathroom Toilet: Handicap height  Home Equipment:  (holds onto window sill when getting up from toilet)  Receives Help From: Family (daughters)  ADL Assistance: Needs assistance (daughter helps with dressing and getting sponge bathed)  Ambulation Assistance: Independent (uses cane)  Transfer Assistance: Independent  Active : No  Patient's  Info: dad mainly, daughter helps  Additional Comments: reports daughter unable to provide 24/7, pt expresses \"you should ask my daugher some of this questions\" part way through subjective interview - becomes increasingly lethargic throughout information       Objective        Orientation  Overall Orientation Status: Within Functional Limits     Balance  Sitting Balance: Stand by assistance (pt lethargic dozing off at times, cues. Sitting up EOB to eat lunch/grooming ADLs/MMT 20-25 minutes)  Standing Balance: Unable to assess(comment) (NWB BLE)  Standing Balance  Comment: NWB BLE  ADL  Feeding: Setup;Stand by assistance;Verbal cueing; Increased time to complete (does not have lower denture, sleepy, assist to open container)  Grooming: Setup;Verbal cueing; Increased time to complete;Stand by assistance (wipe face)  Toileting: Dependent/Total        Bed mobility  Rolling to Left: Moderate assistance  Rolling to Right: Moderate assistance  Supine to Sit: Minimal assistance  Sit to Supine: Minimal assistance  Scooting: Dependent/Total                          LUE AROM (degrees)  LUE AROM : WFL  Left Hand AROM (degrees)  Left Hand AROM: WFL  RUE AROM (degrees)  RUE AROM : WFL  Right Hand AROM (degrees)  Right Hand AROM: WFL  LUE Strength  Gross LUE Strength: WFL  LUE Strength Comment: decreased coordination and motor control BUE  RUE Strength  Gross RUE Strength: WFL                   Plan   Plan  Times per week: 2-5  Times per day: Daily      AM-PAC Score        AM-[facility] Inpatient Daily Activity Raw Score: 13 (10/15/21 1454)  AM-PAC Inpatient ADL T-Scale Score : 32.03 (10/15/21 1454)  ADL Inpatient CMS 0-100% Score: 63.03 (10/15/21 1454)  ADL Inpatient CMS G-Code Modifier : CL (10/15/21 1454)    Goals  Short term goals  Time Frame for Short term goals: d/c  Short term goal 1: supine to sit SBA  Short term goal 2: rolling L/R CGA  Short term goal 3: sit balance IND EOB 5 min  Short term goal 4: feeding SPVN  Short term goal 5: grooming SPVN       Therapy Time   Individual Concurrent Group Co-treatment   Time In 1346         Time Out 1440         Minutes 54               Timed Code Treatment Minutes:   40    Total Treatment Minutes:  [address] [facility], [address] [place] [address], OT

## 2021-10-15 NOTE — PLAN OF CARE
Problem: Falls - Risk of:  Goal: Will remain free from falls  Description: Will remain free from falls  10/15/2021 1019 by Mickey Breen RN  Outcome: Met This Shift  Note: Non-recording camera in place, side rails up x 2 call light in reach bed in low position bed alarm on     Problem: Falls - Risk of:  Goal: Absence of physical injury  Description: Absence of physical injury  Outcome: Met This Shift     Problem: Pain:  Description: Pain management should include both nonpharmacologic and pharmacologic interventions. Goal: Pain level will decrease  Description: Pain level will decrease  10/15/2021 1019 by Mickey Breen RN  Outcome: Met This Shift  Note: Denies any pain on methadone     Problem: Pain:  Description: Pain management should include both nonpharmacologic and pharmacologic interventions. Goal: Control of acute pain  Description: Control of acute pain  Outcome: Met This Shift     Problem: Pain:  Description: Pain management should include both nonpharmacologic and pharmacologic interventions. Goal: Control of chronic pain  Description: Control of chronic pain  Outcome: Met This Shift     Problem: Skin Integrity:  Goal: Will show no infection signs and symptoms  Description: Will show no infection signs and symptoms  10/15/2021 1019 by Mickey Breen RN  Outcome: Met This Shift  Note: See POD note for dsg changes and wound vac. To rt. Foot , changed this am , dsg CDI ; generalized bruising all over, left elbow large bruise , no open areas; pt frequently turns in bed, figidity, ; up at bedside with therapy, non-wt.  Bearing per POD     Problem: Skin Integrity:  Goal: Absence of new skin breakdown  Description: Absence of new skin breakdown  Outcome: Met This Shift     Problem: OXYGENATION/RESPIRATORY FUNCTION  Goal: Patient will achieve/maintain normal respiratory rate/effort  Description: Respiratory rate and effort will be within normal limits for the patient  Outcome: Met This Shift Problem: HEMODYNAMIC STATUS  Goal: Patient has stable vital signs and fluid balance  10/15/2021 1019 by William Dsouza RN  Outcome: Met This Shift  Note: vss     Problem: FLUID AND ELECTROLYTE IMBALANCE  Goal: Fluid and electrolyte balance are achieved/maintained  Outcome: Met This Shift  Note: K+ 5.3 po K+ binder taken      Problem: ACTIVITY INTOLERANCE/IMPAIRED MOBILITY  Goal: Mobility/activity is maintained at optimum level for patient  Outcome: Met This Shift  Note: Awaiting PT/OT eval.     Problem: Nutrition  Goal: Optimal nutrition therapy  Outcome: Completed

## 2021-10-15 NOTE — PROGRESS NOTES
Clinical Pharmacy Progress Note    Vancomycin - Management by Pharmacy    Consult Date(s): 10/2/21  Consulting Provider(s): Dr. Ziyad White / Plan  1) Osteomyelitis of B/L feet - Vancomycin   Concurrent Antimicrobials:  Meropenem + Fluconazole   Day of Vanc Therapy: day #14   Current Dosing Method:  Intermittent  o Therapeutic Goal: Levels 15-20 mcg/mL   Current Dose / Frequency / Plan / Rationale:   o Currently on vancomycin 750 mg IV q24h. o SCr bumped overnight (1.7?2.5). o Random level this AM = 18.5 mcg/mL - drawn ~14h after previous dose. Bayesian kinetics predicts AUC of 630 mg/L*h (above goal of 400-600 mg/mL*h) with steady state trough of 22.9 mcg/mL. o Based on kinetic estimates and bump in SCr, will change to intermittent dosing. Have discontinued scheduled vancomycin and entered placeholder onto [facility]   o Will give vancomycin 750 mg IV x1 today based on level of 18.5 mcg/mL today. o Random level ordered for tomorrow AM, Sat 10/16. Will re-dose as appropriate.  Clinical condition will be monitored closely, and levels will be ordered as clinically indicated. Please call with questions--  Thanks--  Alexi Newman PharmD, BCPS  Wireless: [id number]   10/15/2021 9:46 AM          Interval Update: SCr bumped overnight (1.7?2.5). K 5.3 this AM.    Subjective/Objective:   Baltazar Dallas is a 60 y.o. female with a PMHx significant for CKD4, DVT (2004) on apixaban, DM2, chronic B/L LE ulcers, HTN, and chronic pain on methadone who is admitted with B/L LE osteomyelitis. Pt underwent B/L LE I&D with left 5th ray resection, right 4th ray resection, and rt partial cuboid resection on 10/7. Pharmacy is consulted to dose vancomycin.     Current antibiotics:  Fluconazole 200mg IV q24h (10/2-current)  Meropenem 1000mg IV q12h (10/2-current)  Vancomycin - Pharmacy to dose   (10/2-10/10)    (10/10-10/15)   Intermittent dosing (10/15-current)      Ht Readings from Last 1 Encounters:   10/14/21 5' 2\" (1.575 m)       Wt Readings from Last 1 Encounters:   10/14/21 208 lb 15.9 oz (94.8 kg)     Vancomycin Level(s) / Doses:    Date Time Dose Level / Type of Level Interpretation   10/2 1401 2250 mg IV x1     10/3 0712   Random = 24.5 mcg/mL Drawn ~19 hrs after previous dose given. Hold dose. 10/4 0829  Random =18.1 mcg/mL  Re-dose 500 mg IV x1    1231 500 mg IV x1     10/5 0307  Random = 18.8 mcg/mL Drawn 14.5 hrs after dose    1231 500 mg IV x1  Re-dose with 500 mg IV x 1   10/6 0849  Random = 18.6 mcg/mL Drawn 18h after dose. Will re-dose 500 mg IV x1    1511 500 mg IV x 1     10/7 0520  Random = 15.6 mcg/mL Switch to AUC-based dosing. Schedule vancomycin 750 mg IV Q24 hours and re-assess random 10/8.    1705 750 mg IV x 1     10/8 0454  Random = 18.6 mcg/mL Continue current regimen of 750 mg IV Q24h. Predicted to achieve AUC of 538 mg/L*hr.   10/8 15:25 750mg IV x1     10/9 14:18  Random = 18.6 mcg/mL Drawn ~24 hrs after previous dose.      10/9 16:40 500mg IV x1     10/10 16:58 750mg IV x1     10/11 05:21 750mg IV q24h Random = 23.6 mcg/mL Drawn ~12 hr after prior dose given, predicted  on 750mg q24h   10/14 05:10 750mg IV q24h 15.8 mcg/mL - Random · Drawn ~36h after previous dose (dose not given 10/13)  · Predicted  mg/L*h    10/15 06:32 750 mg IV q24h 18.5 mcg/mL - Random · Drawn ~14h after prior dose given  · Predicted AUC = 630 mg/L*h     Cultures & Sensitivities:  Date Site Micro Susceptibility / Result   10/2 Foot tissue, right Pseudomonas aeruginosa     S: cefepime, gent, meropenem, Zosyn, tobramycin  R: Cipro     Foot tissue, right E. coli  S: Cefepime, ceftriaxone, cefuroxime, ertapenem, gent, meropenem, Zosyn, Bactrim  R: Cipro    Foot tissue, right Enterococcus faecalis S: Ampicillin, Vancomycin      Foot tissue, right Providencia stuartii No further w/u   10/2 Foot wound, unclear R/L Pseudomonas aeruginosa Same as above    Foot wound, unclear R/L E.coli Same as above    Foot wound, unclear R/L Enterococcus faecalis Same as above   10/7 Tissue foot, right Pseudomonas aeruginosa S: cefepime, gent, tobramycin, meropenem, Zoysn  R: cipro     Labs / Ancillary Data:    Estimated Creatinine Clearance: 26 mL/min (A) (based on SCr of 2.5 mg/dL (H)).     Recent Labs     10/13/21  0506 10/14/21  0510 10/15/21  0632   CREATININE 1.7* 1.7* 2.5*   BUN 39* 37* 41*   WBC 8.1 7.2 7.2

## 2021-10-15 NOTE — PROGRESS NOTES
Attempted to contact patient's methadone clinic [facility]) at [phone] to verify prescription and dosage of methadone. Called twice and was not able to reach a provider. Did not leave voicemail. Will try again later. Decreased methadone to 120 mg for now.        Florentino Villavicencio MD, PGY-1

## 2021-10-15 NOTE — PROGRESS NOTES
Physical Therapy    Facility/Department: [place] [id number] [facility]  Initial Assessment    NAME: César Gillette  : 1965  MRN: [id number]    Date of Service: 10/15/2021    Discharge Recommendations:  César Gillette scored a 9/24 on the AM-PAC short mobility form. Current research shows that an AM-PAC score of 17 or less is typically not associated with a discharge to the patient's home setting. Based on the patient's AM-PAC score and their current functional mobility deficits, it is recommended that the patient have 3-5 sessions per week of Physical Therapy at d/c to increase the patient's independence. Please see assessment section for further patient specific details. If patient discharges prior to next session this note will serve as a discharge summary. Please see below for the latest assessment towards goals. PT Equipment Recommendations  Other: defer to next level of care    Assessment   Body structures, Functions, Activity limitations: Decreased functional mobility ; Decreased balance;Decreased strength  Assessment: Pt presents with above deficits s/p B foot wound debridement/drainage for diabetic foot ulcers. Pt requires min A for bed mobility- out of bed mobility limited by B NWB. Pt lethargic and nods off periodically throughout session. Pt would benefit from continued skilled PT to progress towards PLOF and independence. Treatment Diagnosis: decreased functional mobility  Prognosis: Good  Decision Making: Medium Complexity  PT Education: Goals;PT Role;Plan of Care; Functional Mobility Training  Patient Education: pt verbalized understanding  Barriers to Learning: cognition  REQUIRES PT FOLLOW UP: Yes  Activity Tolerance  Activity Tolerance: Patient Tolerated treatment well  Activity Tolerance: OOB mobility limited d/t NWB restrictions and pt not being alert enough for slide board transfer       Patient Diagnosis(es): The encounter diagnosis was Acute on chronic congestive heart failure, unspecified heart and 5th ray resection L foot. Back to OR 10/13 for delayed wound closure and transferred to PCU post-op due to decreased respiratory status. PMH:  Asthma, COPD, DM, HTN, Diabetic neuropathy, CHF, DVT, ETOH abuse  Response To Previous Treatment: Not applicable  General Comment  Comments: Pt supine in bed upon approach on 1L and agreeable to PT/OT eval  Subjective  Subjective: Pt denies pain at rest . Pt lethargic and periodically nods off throughout session- easily arrousd  Pain Screening  Patient Currently in Pain: Denies          Orientation     Social/Functional History  Social/Functional History  Lives With: Family (2 daughters (25, 22))  Type of Home: House  Home Layout: Two level, Able to Live on Main level with bedroom/bathroom  Home Access: Stairs to enter without rails  Entrance Stairs - Number of Steps: 4+ 2 + 1  Bathroom Shower/Tub: Tub/Shower unit  Bathroom Toilet: Handicap height  Home Equipment:  (holds onto window sill when getting up from toilet)  Receives Help From: Family (daughters)  ADL Assistance: Needs assistance (daughter helps with dressing and getting sponge bathed)  Ambulation Assistance: Independent (uses cane)  Transfer Assistance: Independent  Active : No  Patient's  Info: dad mainly, daughter helps  Additional Comments: reports daughter unable to provide 24/7, pt expresses \"you should ask my daugher some of this questions\" part way through subjective interview - becomes increasingly lethargic throughout information  Cognition        Objective             Strength RLE  Strength RLE: WFL  Comment: grossly 4/5 for seated MMTs - some tenderness with pressure. Strength LLE  Strength LLE: WFL  Comment: grossly 4/5 for seated MMTs - some tenderness with pressure.         Bed mobility  Rolling to Left: Moderate assistance  Rolling to Right: Moderate assistance (VC to mantan NWB)  Supine to Sit: Minimal assistance (HOB elevated, HHA)  Sit to Supine: Minimal assistance (HOB flat, min a for trunk, unable to fully relax to nuetral spine- stuck in trunk flexion, pt reports this is d/t chronic back issues)  Scooting: Dependent/Total (to scoot up in bed)  Transfers  Comment: ERNESTINA d/t WBing restrictions. Slide board transfer to chair deffered d/t decreased pt alertness. Balance  Sitting - Static: Fair;+  Sitting - Dynamic: Fair;+  Comments: Sit EOB for ~ 20 min while finishing eatting lunch- several minor LOB d/t lethargy and noddnig off while sitting up however corrects with SBA. Plan   Plan  Times per week: 2-5  Times per day: Daily  Current Treatment Recommendations: Strengthening, Endurance Training, Balance Training, Functional Mobility Training  Plan Comment: pt may potantially benefit from attempted slide bpard transfer if alertness improves  Safety Devices  Type of devices: All fall risk precautions in place, Left in bed, Bed alarm in place, Call light within reach, Nurse notified (RN present)    G-Code       OutComes Score                                                  AM-PAC Score  AM-PAC Inpatient Mobility Raw Score : 9 (10/15/21 1504)  AM-PAC Inpatient T-Scale Score : 30.55 (10/15/21 1504)  Mobility Inpatient CMS 0-100% Score: 81.38 (10/15/21 1504)  Mobility Inpatient CMS G-Code Modifier : CM (10/15/21 1504)          Goals  Short term goals  Time Frame for Short term goals: prior to discharge  Short term goal 1: Pt will complete bed mobility with SBA  Short term goal 2: Pt will sit EOB with indp for seated balance while completing dynamic UE tasks. Short term goal 3: Pt will tolerate slide board transfer to chair.   Patient Goals   Patient goals : None stated       Therapy Time   Individual Concurrent Group Co-treatment   Time In 1345         Time Out 1438         Minutes 53         Timed Code Treatment Minutes: [facility], [facility]

## 2021-10-15 NOTE — PROGRESS NOTES
Multiple attempt s at weaning o2, when awake room air sat 93%; sleeping room air 78-86%, 1 liters sats with sleeping 92%

## 2021-10-15 NOTE — PROGRESS NOTES
Podiatric Surgery Daily Progress Note  Nicholas Moya      Subjective :   Patient seen and examined this pm at the bedside. Patient denies any acute overnight events. Patient denies N/V/F/C/SOB. Patient denies calf pain, thigh pain, chest pain. Review of Systems: A 12 point review of symptoms is unremarkable with the exception of the chief complaint. Patient specifically denies nausea, fever, vomiting, chills, shortness of breath, chest pain, abdominal pain, constipation or difficulty urinating. Objective     BP (!) 150/82   Pulse 64   Temp 97.6 °F (36.4 °C) (Oral)   Resp 22   Ht 5' 2\" (1.575 m)   Wt 208 lb 15.9 oz (94.8 kg)   LMP 10/23/2015   SpO2 95%   BMI 38.23 kg/m²      I/O:    Intake/Output Summary (Last 24 hours) at 10/15/2021 0930  Last data filed at 10/15/2021 0923  Gross per 24 hour   Intake 866 ml   Output 1410 ml   Net -544 ml              Wt Readings from Last 3 Encounters:   10/14/21 208 lb 15.9 oz (94.8 kg)   08/12/21 200 lb (90.7 kg)   06/18/21 218 lb 0.6 oz (98.9 kg)       LABS:    Recent Labs     10/14/21  0510 10/15/21  0632   WBC 7.2 7.2   HGB 7.6* 7.3*   HCT 23.8* 22.5*    327        Recent Labs     10/15/21  0632      K 5.3*      CO2 25   BUN 41*   CREATININE 2.5*        No results for input(s): PROT, INR, APTT in the last 72 hours. LOWER EXTREMITY EXAMINATION    Dressing to bilateral LE intact. No strikethrough noted to the external dressing. Moderate sanguinous drainage noted to the internal layers of the dressing. VASCULAR: DP and PT pulses nonpalpable bilateral.  Upon hand-held Doppler examination, DP and PT signals weakly biphasic bilateral. CFT is brisk to the distal aspect of the foot bilateral. Skin temperature is warm to cool from proximal to distal with no focal calor noted.  +2 pitting edema noted bilateral.  No pain with calf compression b/l.     NEUROLOGIC: Gross and epicritic sensation is diminished b/l.  Protective sensation is diminished at all pedal sites b/l. DERMATOLOGIC:   Right lower extremity:    Full-thickness ulceration noted to the plantar aspect beginning distally at the level of the previous fourth metatarsal head and extending proximally to the cuboid with cuboid bone exposed. Sutures noted distally remain intact with skin edges well adhered. Wound base  Is a mixture of granular and fibrtoic. No fluctuance, crepitus, active drainage, erythema, or malodor noted. No dehiscence noted to wound edges. Left lower extremity:    Surgical incision noted to the lateral aspect of the left foot. Sutures remain intact with skin edges well adhered. No signs of dehiscence noted. No drainage, malodor, fluctuance, or crepitus noted. No acute signs of infection noted. MUSCULOSKELETAL: Muscle strength is 4/5 for all pedal groups tested. No pain with palpation of the foot or ankle b/l. Ankle joint ROM is decreased in dorsiflexion with the knee extended.  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 S/p left fifth ray resection and right fourth ray and partial cuboid resection.     IMAGING:  X-ray right foot 10/13-postop  Narrative   History: Status post partial cuboid amputation. Right foot infection.       3 views right foot.       FINDINGS: There is a large defect along bilateral foot with wound VAC device. Extensive soft tissue swelling. Resection of much of the cuboid. No acute complication identified.           Impression   1. Partial amputation the region of the cuboid with soft tissue defect and extensive soft tissue swelling. No acute complication identified. X-ray right foot 10/7/2021-postop  Narrative   EXAM: XR FOOT RIGHT (2 VIEWS)       INDICATION:  s/p 4th ray resection and partial cuboid resection; packed open       COMPARISON: 10/3/2021, 10/2/2021       FINDINGS:       There are postsurgical changes of fourth ray resection and partial cuboid resection.  Postsurgical changes of prior fifth ray resection and first, second, and third toe resections again seen.           Impression       Expected postsurgical changes of right fourth ray and partial cuboid resection. X-ray left foot 10/7/2021-postop  Narrative   EXAM: XR FOOT LEFT (2 VIEWS)       INDICATION:  s/p 5th ray resection & cuboid bone biopsy; packed open       COMPARISON: 10/3/2021, 10/2/2021       FINDINGS:       There are postsurgical changes of fifth ray resection. There is a small amount of postsurgical subcutaneous gas. Redemonstration of prior amputation of the first digit at the level of the base of the first proximal phalanx again seen.           Impression       Expected postsurgical changes of left fifth ray resection. MRI right foot 10/3/2021  Narrative   EXAMINATION: Magnetic resonance imaging (MRI) of the right foot without contrast       HISTORY: Osteomyelitis 5th metatarsal base; rule out OM cuboid       TECHNIQUE: MRI of the right foot was performed using multiple pulse sequences in multiple planes without contrast.       COMPARISON: Radiograph dated 10/2/2021       FINDINGS:        There have been prior first second third MTP joint, fourth transmetatarsal, and fifth TMT joint amputations. There is a soft tissue ulcer lateral to the base of the fourth metatarsal with adjacent T1 hypointensity in marrow edema involving the base of    the fourth metatarsal as well as the lateral aspect of the cuboid representing osteomyelitis. There is also soft tissue swelling more distally along the plantar aspect of the remaining fourth metatarsal shafts with osteomyelitis along the plantar cortex    of the remaining fourth metatarsal. There is no acute fracture or dislocation. There is a degenerative subchondral cyst in the navicular. No abscess is seen within limits of noncontrast examination. Visualized tendons and plantar fascia are unremarkable.           Impression   Multiple prior amputations.    Soft tissue ulceration lateral to the cuboid with mild acute osteomyelitis involving the lateral base of the fourth metatarsal and more distal plantar aspect of the remaining fourth metatarsal shaft as well as the lateral aspect of the cuboid.        MRI left foot 10/3/2021  Narrative   EXAMINATION: Magnetic resonance imaging (MRI) of the left foot without contrast       HISTORY: Osteomyelitis 5th metatarsal       TECHNIQUE: MRI of the left foot was performed using multiple pulse sequences in multiple planes without contrast.       COMPARISON: Radiograph dated 10/2/2021       FINDINGS:        There has been prior amputation of the first digit at the level of the base of the first proximal phalanx. There is soft tissue ulceration lateral to the fifth mid metatarsal with surrounding edema and phlegmon in the subcutaneous tissues. There is    extensive destruction of the mid to distal aspect of the fifth metatarsal and base of the fifth proximal phalanx representing sequelae of osteomyelitis. There is also mild osteomyelitis extending to the base of the fifth metatarsal as well as along the    lateral aspect of the cuboid.           Impression   Osteomyelitis involving the fifth metatarsal and fifth proximal phalanx with extensive osseous destruction. Mild osteomyelitis involving the lateral aspect of the cuboid. Prior partial first digit amputation.      Lower extremity arterial duplex 10/4/2021-preliminary  Right   Right CRISTIAN was not available due to patient non-compliance . There are multiphasic waveforms in the common femoral artery indicating no   aortoiliac inflow disease. There is atherosclerotic plaque involving the superficial femoral and   popliteal arteries with no significantly elevated velocities. Left   Left CRISTIAN was not available due to patient non-compliance . There are multiphasic waveforms in the common femoral artery indicating no   aortoiliac inflow disease.    There is atherosclerotic plaque involving the superficial femoral and   popliteal arteries with no significantly elevated velocities. The peroneal artery is not visualized. There is no previous exam for comparison.        ASSESSMENT/PLAN   -s/p I&D with partial cuboid resection and application of wound vac to the right foot and I&D of the left foot with delayed primary closure of the left lower extremity. 10/13/2021  -S/p bilateral lower extremity I&D with left fifth ray resection, right fourth ray resection, right partial cuboid resection, 10/7/2021  -Diabetic foot ulceration with osteomyelitis, Carpenter 3, right lower extremity  -PVD, bilateral lower extremity  -Edema, bilateral lower extremity  -Diabetes mellitus type 2 with peripheral neuropathy  -History of noncompliance with weightbearing status    -Patient examined and evaluated at the bedside   -Hypertensive otherwise VSS.  No leukocytosis noted. -ESR >120, CRP 205.1  -Imaging reviewed, noted above  -Bone biopsy taken on (10/7) right foot, sent for pathology; right fourth metatarsal shows OM, right cuboid shows OM, right cuboid clearance fragment shows partially treated OM with degenerative changes  -Bone biopsy taken on (10/7)  of the left foot shows partially treated OM of the 5th digit, 5th metatarsal with partially treated OM, left cuboid clearance fragment shows degenerative changes with no evidence of OM  -Wound culture right foot: Pseudomonas aeruginosa, E coli, Enterococcus faecalis (10/2)  -Surgical culture (10/13) preliminary results no WBCs organisms seen  -Surgical path pending  -Left lower extremity was dressed with Betadine, Adaptic, ABD, gauze, Kerlix, Ace  -Right lower extremity was dressed with wound VAC dressing, Adaptic, gauze, Kerlix, Ace  -Continue antibiotics per ID recommendations. Final ID recs vancomycin and Invanz through 11/26  -Non-weightbearing bilateral lower extremity  -Wound vac paper work was given to the .     DISPO: s/p I&D with partial cuboid resection and application of wound vac to the right foot and I&D of the left foot with delayed primary closure of the left lower extremity. Patient will be nonweight bearing to bilateral lower extremity. ID following; final ID recs noted above. PT/OT eval placed. No further surgical intervention needed from a podiatry standpoint. Patient ok to discharge from podiatry standpoint pending PT/OT eval, SNF placement, and medical Improvement.     Discussed assessment and plan with Dr. Paras Lugo DPM.    Helena Young DPM  10/15/21  9:30 AM

## 2021-10-15 NOTE — PROGRESS NOTES
Occupational Therapy/ Physical Therapy  Attempt    Orders reviewed and received. Attempt to see pt this AM for OT/PT evals. Per conversation with RN pt is currently unavailable, podiatry to change pt BLE dressings. Will f/u this afternoon or as treatment schedule allows.      Rachel Siddiqui, MOT, OTR/L  [address], [address] [facility]

## 2021-10-15 NOTE — PROGRESS NOTES
Office : [phone]     Fax :[phone]       Nephrology progress  Note      Patient's Name: Remberto Burleson    Reason for Consult:  FAHEEM on CKD 4       Requesting Physician:  Wilfredo Echeverria MD      Chief Complaint:    Chief Complaint   Patient presents with    Leg Pain     x 2 weeks. History of Present iIlness:    Remberto Burleson is a 60 y.o. female with past medical h/o CKD4, DVT, T2DM c/b b/l lower extremity ulcers  who presented for progressively worsening b/l lower extremity pain for the 2 weeks. Patient states she presented today with pain in legs so severe that it is now inhibiting her ability to ambulate. She endorses that the legs are also more swollen and warm to touch with pain when she touches it. She endorses she tries to eat a healthy diet low in salt, she also denies any major weight fluctuations recently.     Baseline creat is 2/0   Now elevated at 2.4        INTERVAL HISTORY    Feels better  Shortness of breath: No   UOP: Fair  Creat: trending down  Has candida           I/O last 3 completed shifts:   In: 36 [P.O.:410; I.V.:1189]  Out: 2010 [Urine:2010]    Past Medical History:   Diagnosis Date    Asthma 05/14/2004    Bacterial vaginosis 04/2008    Carpal tunnel syndrome 05/2007    COPD (chronic obstructive pulmonary disease) ([address]     Diabetes mellitus type II 08/2007    10/1/20 pt states does accucheck 2x/day at home    Diabetic neuropathy ([address] 96/3254    Diastolic CHF ([address]     DVT (deep venous thrombosis) ([address] 03/2004    Dyslipidemia 05/2009    Dyspareunia 05/2009    ETOH abuse 03/04/2007    Feet clawing     HTN (hypertension)     Hx of blood clots     Hyperlipidemia     MRSA (methicillin resistant staph aureus) culture positive 11/06/2017; 2017    foot; leg     Neuropathy 2009    polyneuropathy    Pancreatitis 2004    Scalp lesion 2007    Tobacco abuse 2008    Uses walker     Uses wheelchair     also uses walker    Wears dentures        Past Surgical History:   Procedure Laterality Date     SECTION  unknown    FOOT DEBRIDEMENT Right 2019    INCISION AND DRAINAGE WITH APPLICATION OF STRAVIX GRAFT RIGHT FOOT performed by Lazarus Sayers, DPM at [address] [place] [address] Right 2019    RIGHT FOOT INCISION AND DRAINAGE WITH STAGING TRANSMETATARSAL AMPUTATION performed by Lazarus Sayers, DPM at [address] [place] [address] Right 2019    RIGHT FOOT DEBRIDEMENT INCISION AND DRAINAGE, OPEN DIABETIC FOOT ULCER WITH GRAFT PLACEMENT performed by Lazarus Sayers, DPM at [address] [place] [address] Left 10/23/2019    LEFT FOOT INCISION AND DRAINAGE , DEBRIDEMENT OF OPEN WOUND, APPLICATION OF STRAVIX GRAFT performed by Lazarus Sayers, DPM at [address] [place] [address] Right 3/29/2021    INCISION AND DRAINAGE, DEBRIDEMENT OF DIABETIC WOUND WITH PLACEMENT OF STRAVIX GRAFT RIGHT FOOT performed by Lazarus Sayers, DPM at [address] [place] [address]  10/7/2021    BILATERAL LOWER EXTREMITY INCISION AND DRAINAGE, RIGHT FOOT 4TH RAY RESECTION, LEFT FOOT 5TH RAY RESECTION performed by Lazarus Sayers, DPM at [address] [place] [address] Bilateral 10/13/2021    REPEAT INCISION AND DRAINAGE OF ALL NONVIABLE SOFT TISSUE AND BONE/ PARTIAL CUBOID RESECTION/ BONY BIOPSY AS NEEDED/ WOUND VAC RIGHT LOWER EXTREMITY performed by Lazarus Sayers, DPM at [address] IR TUNNELED Cammy Needy 5 YEARS  10/5/2021    IR TUNNELED CATHETER PLACEMENT GREATER THAN 5 YEARS 10/5/2021 [facility] SPECIAL PROCEDURES    KNEE SURGERY Left     from falling off ladder -- has screws in place pt report    OTHER SURGICAL HISTORY Left 2016    I & D left foot    OTHER SURGICAL HISTORY Right 10/20/2017    RIGHT GASTROC LENGTHENING ENDOSCOPIC, INJECTION OF AMNI GRAFT    OTHER SURGICAL HISTORY Right 04/26/2018    Diabetic foot ulcer I&D w/ integra graft application    NV DEBRIDEMENT, SKIN, SUB-Q TISSUE,MUSCLE,BONE,=<20 SQ CM Right 8/17/2018    RIGHT FOOT DEBRIDEMENT INCISION AND DRAINAGE, PARTIAL 5TH RAY AMPUTATION performed by Brenna Prasad DPM at [facility] / [address]  7/7003    cryotherapy done on lesion    TOE AMPUTATION Left 02/24/2017    AMPUTATION LEFT GREAT TOE                 TONSILLECTOMY         History reviewed. No pertinent family history. reports that she quit smoking about 3 years ago. Her smoking use included cigarettes. She has a 30.00 pack-year smoking history. She has never used smokeless tobacco. She reports that she does not drink alcohol and does not use drugs.         Allergies:  Sulfa antibiotics    Current Medications:    vancomycin (VANCOCIN) intermittent dosing (placeholder), See Admin Instructions  [START ON 10/16/2021] methadone (DOLOPHINE) tablet 120 mg, Daily  sodium zirconium cyclosilicate (LOKELMA) oral suspension 10 g, Once  glucose (GLUTOSE) 40 % oral gel 15 g, PRN  dextrose 50 % IV solution, PRN  glucagon (rDNA) injection 1 mg, PRN  dextrose 5 % solution, PRN  sodium zirconium cyclosilicate (LOKELMA) oral suspension 5 g, TID  0.9 % sodium chloride infusion, Continuous  LORazepam (ATIVAN) injection 0.5 mg, Q10 Min PRN  buprenorphine (SUBUTEX) SL tablet 2 mg, PRN  dextrose 50 % IV solution, PRN  dextrose 5 % solution, PRN  heparin (porcine) injection 5,000 Units, 3 times per day  metoprolol succinate (TOPROL XL) extended release tablet 50 mg, Daily  hydrALAZINE (APRESOLINE) tablet 50 mg, 3 times per day  hydrALAZINE (APRESOLINE) injection 5 mg, Q6H PRN  0.9 % sodium chloride infusion, PRN  lidocaine PF 1 % injection 5 mL, Once  sodium chloride flush 0.9 % injection 5-40 mL, 2 times per day  sodium chloride flush 0.9 % injection 5-40 mL, PRN  0.9 % sodium chloride infusion, PRN  labetalol (NORMODYNE;TRANDATE) injection syringe 10 mg, Q4H PRN  0.9 % sodium chloride infusion, PRN  meropenem (MERREM) 1,000 mg in sodium chloride 0.9 % 100 mL IVPB (mini-bag), Q12H  glucose (GLUTOSE) 40 % oral gel 15 g, PRN  dextrose 50 % IV solution, PRN  glucagon (rDNA) injection 1 mg, PRN  dextrose 5 % solution, PRN  insulin glargine (LANTUS;BASAGLAR) injection pen 12 Units, Nightly  insulin lispro (1 Unit Dial) 0-18 Units, TID WC  insulin lispro (1 Unit Dial) 0-9 Units, Nightly  fluconazole (DIFLUCAN) 200 mg IVPB, Q24H  albuterol (PROVENTIL) nebulizer solution 2.5 mg, Q6H PRN  amitriptyline (ELAVIL) tablet 100 mg, Nightly  aspirin EC tablet 81 mg, Daily  atorvastatin (LIPITOR) tablet 20 mg, Nightly  escitalopram (LEXAPRO) tablet 10 mg, Daily  gabapentin (NEURONTIN) capsule 400 mg, BID  pantoprazole (PROTONIX) tablet 40 mg, Daily  sodium chloride flush 0.9 % injection 5-40 mL, PRN  0.9 % sodium chloride infusion, PRN  ondansetron (ZOFRAN-ODT) disintegrating tablet 4 mg, Q8H PRN   Or  ondansetron (ZOFRAN) injection 4 mg, Q6H PRN  polyethylene glycol (GLYCOLAX) packet 17 g, Daily PRN  acetaminophen (TYLENOL) tablet 650 mg, Q6H PRN   Or  acetaminophen (TYLENOL) suppository 650 mg, Q6H PRN  sodium chloride flush 0.9 % injection 5-40 mL, 2 times per day  influenza quadrivalent split vaccine (FLUZONE;FLUARIX;FLULAVAL;AFLURIA) injection 0.5 mL, Prior to discharge          Physical exam:     Vitals:  /84   Pulse 66   Temp 96 °F (35.6 °C) (Axillary)   Resp 16   Ht 5' 2\" (1.575 m)   Wt 228 lb 2.8 oz (103.5 kg) Comment: night nurse did it forgot to place it  LMP 10/23/2015   SpO2 93%   BMI 41.73 kg/m²   Constitutional:   NAD  Skin: no rash, turgor wnl  Heent:  eomi, mmm  Neck: no bruits or jvd noted  Cardiovascular:  S1, S2 without m/r/g  Respiratory: b/L base crackles   Abdomen:  +bs, soft, nt, nd  Ext: +  lower extremity edema      Labs:  CBC:   Recent Labs     10/13/21  0506 10/14/21  0510 10/15/21  0632   WBC 8.1 7.2 7.2   HGB 9.1* 7.6* 7.3*    293 327     BMP:    Recent Labs     10/13/21  0506 10/13/21  0506 10/14/21  0510 10/14/21  0510 10/14/21  1624 10/14/21  2334 10/15/21  0632     --  140  --   --   --  139   K 5.3*   < > 6.1*   < > 6.1* 5.6* 5.3*     --  107  --   --   --  105   CO2 26  --  27  --   --   --  25   BUN 39*  --  37*  --   --   --  41*   CREATININE 1.7*  --  1.7*  --   --   --  2.5*   GLUCOSE 52*  --  95  --   --   --  68*    < > = values in this interval not displayed. Ca/Mg/Phos:   Recent Labs     10/13/21  0506 10/14/21  0510 10/15/21  0632   CALCIUM 9.1 8.5 8.7   MG 2.10 2.00 2.00     Hepatic:   No results for input(s): AST, ALT, ALB, BILITOT, ALKPHOS in the last 72 hours. Troponin:   No results for input(s): TROPONINI in the last 72 hours. BNP: No results for input(s): BNP in the last 72 hours. Lipids: No results for input(s): CHOL, TRIG, HDL, LDLCALC, LABVLDL in the last 72 hours. ABGs:   Recent Labs     10/14/21  1333   PHART 7.242*   PO2ART 36.3*   JZY1ZEY 68.5*     INR:   No results for input(s): INR in the last 72 hours. UA:  No results for input(s): Merrily [place], GLUCOSEU, BILIRUBINUR, KETUA, SPECGRAV, BLOODU, PHUR, PROTEINU, UROBILINOGEN, NITRU, LEUKOCYTESUR, Twyla Oh in the last 72 hours. Urine Microscopic:   No results for input(s): LABCAST, BACTERIA, COMU, HYALCAST, WBCUA, RBCUA, EPIU in the last 72 hours. Urine Culture: No results for input(s): LABURIN in the last 72 hours. Urine Chemistry:   No results for input(s): Blu Ng, PROTEINUR, NAUR in the last 72 hours. IMAGING:  XR FOOT RIGHT (MIN 3 VIEWS)   Final Result   1. Partial amputation the region of the cuboid with soft tissue defect and extensive soft tissue swelling. No acute complication identified. XR FOOT RIGHT (2 VIEWS)   Final Result      Expected postsurgical changes of right fourth ray and partial cuboid resection.       XR FOOT LEFT (2 VIEWS) Final Result      Expected postsurgical changes of left fifth ray resection. VL Extremity Venous Bilateral   Final Result      IR TUNNELED CVC PLACE WO SQ PORT/PUMP > 5 YEARS   Final Result   Impression:      Successful placement of a tunneled central venous catheter via the right internal jugular vein. The catheter is ready for immediate use. VL DUP LOWER EXTREMITY ARTERIES BILATERAL   Final Result      MRI FOOT LEFT WO CONTRAST   Final Result   Osteomyelitis involving the fifth metatarsal and fifth proximal phalanx with extensive osseous destruction. Mild osteomyelitis involving the lateral aspect of the cuboid. Prior partial first digit amputation. MRI FOOT RIGHT WO CONTRAST   Final Result   Multiple prior amputations. Soft tissue ulceration lateral to the cuboid with mild acute osteomyelitis involving the lateral base of the fourth metatarsal and more distal plantar aspect of the remaining fourth metatarsal shaft as well as the lateral aspect of the cuboid. XR FOOT LEFT (MIN 3 VIEWS)   Final Result      Numerous osteotomies with soft tissue swelling and possible ulcer lateral aspect of the right midfoot. No plain radiographic evidence for osteomyelitis, however plain film findings for osteomyelitis are often late findings. 3 views left foot      FINDINGS:      There is a mottled appearance of the phalanges of the right fifth digit as well as the fifth metatarsal. The remaining metatarsals unremarkable in appearance. Patient's had prior osteotomy of the first distal phalangeal tuft. IMPRESSION:      Plain radiographic evidence for osteomyelitis involving the fifth phalanges and fifth metatarsal. Patient's bones are osteopenic. XR FOOT RIGHT (MIN 3 VIEWS)   Final Result      Numerous osteotomies with soft tissue swelling and possible ulcer lateral aspect of the right midfoot.  No plain radiographic evidence for osteomyelitis, however plain film findings for osteomyelitis are often late findings. 3 views left foot      FINDINGS:      There is a mottled appearance of the phalanges of the right fifth digit as well as the fifth metatarsal. The remaining metatarsals unremarkable in appearance. Patient's had prior osteotomy of the first distal phalangeal tuft. IMPRESSION:      Plain radiographic evidence for osteomyelitis involving the fifth phalanges and fifth metatarsal. Patient's bones are osteopenic. XR CHEST PORTABLE   Final Result      Mild cardiomegaly and mild interstitial edema. Assessment/Plan :      1. Lin onc CKD 4   LIN 2/2 multiple factors   Lasix based on vol status      2. HTN. BP controlled   - on hydralazine    3. Acute on chronic CHF   - stable   -dc IVF    4. DM 2.     5. Electrolytes. Monitor     Hyperkalemia     - Low K diet   resolved      6.  Diabetic foot infection with necrotic ulcer   Renal dose abx   ID following       Recommend to dose adjust all medications  based on renal functions  Maintain SBP> 90 mmHg   Daily weights   AVOID NSAIDs  Avoid Nephrotoxins  Monitor Intake/Output  Call if significant decrease in urine output       Spoke to Toby Barraza MD     Thank you for allowing us to participate in care of Jennifer Harvey MD  10/16/2021    [facility]  [address]  Office : [phone]  Fax :[phone]

## 2021-10-16 PROBLEM — N18.9 ACUTE KIDNEY INJURY SUPERIMPOSED ON CKD (HCC): Status: ACTIVE | Noted: 2019-06-07

## 2021-10-16 NOTE — PROGRESS NOTES
Offered pt BIPAP, but she declines and states \"I don't need that any more. \"  She is alert/oriented, RR 18/unlabored, SpO2 95% on 1 L via nasal cannula. Discussed with RN; we will monitor her for need for BIPAP.

## 2021-10-16 NOTE — PROGRESS NOTES
ID Follow-up NOTE    CC:   Diabetic foot ulcer / infection  Antibiotics: Vancomycin, Meropenem    Admit Date: 10/1/2021  Hospital Day: 16    Subjective:     POD#3   - L foot - I&D, closure   - R foot - I&D, further cuboid resection, VAC placed  Postop hypoventilation, admit to PCU    Awake, alert, conversant   No feet / surg site pain    Objective:     Patient Vitals for the past 8 hrs:   BP Temp Temp src Pulse Resp SpO2   10/16/21 0907      95 %   10/16/21 0700 (!) 162/86   67  95 %   10/16/21 0400 (!) 152/81 96.8 °F (36 °C) Oral 64 18 95 %     I/O last 3 completed shifts: In: 2067 [P.O.:410; I.V.:1657]  Out: 1360 [Urine:1360]  No intake/output data recorded. EXAM:  GENERAL: No apparent distress.   RA  HEENT: Membranes moist, no oral lesion  NECK:  Supple, no lymphadenopathy  LUNGS: Clear b/l, no rales, no dullness  CARDIAC: RRR, no murmur appreciated  ABD:  Obese, + BS, soft / NT  EXT:  LE venous stasis, b/l foot dressing / ACE in place / R foot VAC  NEURO: No focal neurologic findings  PSYCH: Orientation, sensorium, mood normal  LINES:  R chest tunnel venous line in place       Data Review:  Lab Results   Component Value Date    WBC 6.0 10/16/2021    HGB 6.9 (LL) 10/16/2021    HCT 21.0 (L) 10/16/2021    MCV 85.1 10/16/2021     10/16/2021     Lab Results   Component Value Date    CREATININE 2.0 (H) 10/16/2021    BUN 39 (H) 10/16/2021     10/16/2021    K 4.9 10/16/2021     10/16/2021    CO2 24 10/16/2021       Hepatic Function Panel:   Lab Results   Component Value Date    ALKPHOS 131 10/01/2021    ALT 13 10/01/2021    AST 16 10/01/2021    PROT 6.9 10/01/2021    PROT 6.6 07/21/2011    BILITOT <0.2 10/01/2021    BILIDIR <0.2 10/01/2021    IBILI see below 10/01/2021    LABALBU 2.3 10/15/2021       MICRO:  10/13 Surg cult  - no growth to date     10/7 Surg cult: Gs 1+WBC, no organism; cult rare Ps aeruginosa, rare mixed skin sukhjinder  Antibiotic Interpretation ISAAC   cefepime Sensitive 8 mcg/mL   ciprofloxacin Resistant >2 mcg/mL   gentamicin Sensitive <=4 mcg/mL   meropenem Sensitive <=1 mcg/mL   piperacillin-tazobactam Sensitive <=16 mcg/mL   tobramycin Sensitive <=4 mcg/mL     10/2 Wound (not know if L or R): light Ps aeruginosa (R cipro), light 2nd E coli, light Enterococcus faecalis  Pseudomonas aeruginosa   Antibiotic Interpretation ISAAC   cefepime Sensitive 8 mcg/mL   ciprofloxacin Resistant >2 mcg/mL   gentamicin Sensitive <=4 mcg/mL   meropenem Sensitive <=1 mcg/mL   piperacillin-tazobactam Sensitive <=16 mcg/mL   tobramycin Sensitive <=4 mcg/mL     Escherichia coli   Antibiotic Interpretation ISAAC   amoxicillin-clavulanate Intermediate 16/8 mcg/mL   ampicillin Resistant >16 mcg/mL   ceFAZolin Resistant >16 mcg/mL   cefepime Sensitive <=2 mcg/mL   cefTRIAXone Sensitive <=1 mcg/mL   cefuroxime Sensitive 8 mcg/mL   ciprofloxacin Resistant >2 mcg/mL   ertapenem Sensitive <=0.5 mcg/mL   gentamicin Sensitive <=4 mcg/mL   meropenem Sensitive <=1 mcg/mL   piperacillin-tazobactam Sensitive <=16 mcg/mL   trimethoprim-sulfamethoxazole Sensitive <=2/38 mcg/mL     Enterococcus  faecalis   Antibiotic Interpretation ISAAC   ampicillin Sensitive <=2 mcg/mL   vancomycin Sensitive 2 mcg/mL       IMAGING:  10/3 L foot MRI  Impression   Osteomyelitis involving the fifth metatarsal and fifth proximal phalanx with extensive osseous destruction. Mild osteomyelitis involving the lateral aspect of the cuboid. Prior partial first digit amputation     10/3 R foot MRI   Impression   Multiple prior amputations. Soft tissue ulceration lateral to the cuboid with mild acute osteomyelitis involving the lateral base of the fourth metatarsal and more distal plantar aspect of the remaining fourth metatarsal shaft as well as the lateral aspect of the cuboid.        Scheduled Meds:   vancomycin (VANCOCIN) intermittent dosing (placeholder)   Other See Admin Instructions    methadone  120 mg Oral Daily    sodium zirconium cyclosilicate  10 g Oral Once    sodium zirconium cyclosilicate  5 g Oral TID    heparin (porcine)  5,000 Units SubCUTAneous 3 times per day    metoprolol succinate  50 mg Oral Daily    hydrALAZINE  50 mg Oral 3 times per day    lidocaine 1 % injection  5 mL IntraDERmal Once    sodium chloride flush  5-40 mL IntraVENous 2 times per day    meropenem  1,000 mg IntraVENous Q12H    insulin glargine  12 Units SubCUTAneous Nightly    insulin lispro  0-18 Units SubCUTAneous TID WC    insulin lispro  0-9 Units SubCUTAneous Nightly    fluconazole  200 mg IntraVENous Q24H    amitriptyline  100 mg Oral Nightly    aspirin EC  81 mg Oral Daily    atorvastatin  20 mg Oral Nightly    escitalopram  10 mg Oral Daily    gabapentin  400 mg Oral BID    pantoprazole  40 mg Oral Daily    sodium chloride flush  5-40 mL IntraVENous 2 times per day    influenza virus vaccine  0.5 mL IntraMUSCular Prior to discharge       Continuous Infusions:   sodium chloride      sodium chloride 50 mL/hr at 10/15/21 1531    dextrose      dextrose      sodium chloride      sodium chloride Stopped (10/10/21 2136)    sodium chloride      dextrose      sodium chloride 25 mL (10/14/21 0507)       PRN Meds:  sodium chloride, glucose, dextrose, glucagon (rDNA), dextrose, LORazepam, buprenorphine, dextrose, dextrose, hydrALAZINE, sodium chloride, sodium chloride flush, sodium chloride, labetalol, sodium chloride, glucose, dextrose, glucagon (rDNA), dextrose, albuterol, sodium chloride flush, sodium chloride, ondansetron **OR** ondansetron, polyethylene glycol, acetaminophen **OR** acetaminophen      Assessment:     Obesity (BMI 37), DM, neuropathy, HTN, HL, CKD, chronic pain  Hx DM foot infection / surgeries - has R TMA, L hallux resection    L foot wound infection / osteomyelitis   - MRI with 5th MT + prox 5th phalanx destruction, lateral cuboid edema, reviewed images with Radiologist 10/4   - OR 10/7 - 5th ray resection; path - 'focal subacute, partially treated osteomyelitis' (L 5th MT)    R foot wound infection / osteomyelitis    - MRI with cuboid, 4th MT OM, reviewed images with Radiologist 10/4   - OR 10/7 - R 4th ray, partial cuboid resection - NOT definitive   - Path with 4th MT acute osteomyelitis, R cuboid 'focal acute osteomyelitis', R cuboid clearance frag 'rare focus of acute osteomyeltiis'   - OR 10/13 - L I&D and closure; R I&D, further cuboid resection, VAC    Cult polymicrobial with Ps aerug (R cipro), E coli, E faecalis    FAHEEM, Cr today (1.7)    Plan:     Cont vancomycin, meropenem   Will f/u on OR cult (no growth to date) / path from 10/13    See Joan Bernal   - will need prolonged course iv antibiotics after discharge (vanco / meropenem)    Medical Decision Making:   The following items were considered in medical decision making:  Discussion of patient care with other providers  Reviewed clinical lab tests  Reviewed radiology tests  Reviewed other diagnostic tests/interventions  Independent review of radiologic images - reviewed MRI with Radiologist 10/4  Microbiology cultures and other micro tests reviewed      Discussed with pt, Podiatry Residents, RN  Naomi Sandoval MD    INFUSION ORDERS: modified 10/16  Vancomycin 750 mg iv daily through 11/26  Meropenem 1 gm iv q 12 through 11/26  - Diagnosis - DM foot osteomyelitis   - First dose given in hospital  - PICC   - Disposition / date discharge  - Check CBC w diff, CMP, ESR, CRP, CK every Mon or Tue - FAX result to [phone]  - Call with antibiotic / infusion issues, [phone]  - Call with any change in status, transfer in or out of a facility or to hospital - [phone]  - No f/u in outpatient ID office necessary

## 2021-10-16 NOTE — PLAN OF CARE
Podiatric Surgery Plan of Care Note  Lachelle Duran    Wound VAC to right lower extremity removed as patient will be discharging this evening at 2000 to skilled nursing facility. Wet to dry dressing applied to right lower extremity. Facility to apply Wound VAC upon arrival per EBONY instructions. Patient tolerated dressing change well.      Manav Grace DPM  Podiatric Resident, PGY-2  Pager #: [phone] or Perfect Serve

## 2021-10-16 NOTE — PLAN OF CARE
Problem: Pain:  Goal: Pain level will decrease  Description: Pain level will decrease  Outcome: Met This Shift     Problem: Falls - Risk of:  Goal: Will remain free from falls  Description: Will remain free from falls  Outcome: Ongoing  Note: No falls noted thus far this shift, bed in lowest position, alarm on, non-skid socks on, call light within reach, hourly checks, safety maintained, will continue to monitor. Call light within reach. Problem: Skin Integrity:  Goal: Will show no infection signs and symptoms  Description: Will show no infection signs and symptoms  Outcome: Ongoing     Problem: OXYGENATION/RESPIRATORY FUNCTION  Goal: Patient will maintain patent airway  Outcome: Ongoing  Note: Pt is currently on 1L NC d/t de-satting while sleeping. Pt is tolerating well at 95%.      Problem: HEMODYNAMIC STATUS  Goal: Patient has stable vital signs and fluid balance  Outcome: Ongoing     Problem: FLUID AND ELECTROLYTE IMBALANCE  Goal: Fluid and electrolyte balance are achieved/maintained  Outcome: Ongoing

## 2021-10-16 NOTE — PROGRESS NOTES
Progress Note    Admit Date: 10/1/2021  Day: 15  Diet: ADULT DIET; Regular; 3 carb choices (45 gm/meal); Low Sodium (2 gm); Low Potassium (Less than 3000 mg/day)  Adult Oral Nutrition Supplement; Wound Healing Oral Supplement    CC: B/L leg pain for 2-6 weeks    Interval history:   NAONE. Patient remained HDS overnight. Requiring 1L NC at night after de-sating while sleeping. Patient reports post-op pain is well controlled, she is tolerating meals, denies any CP, SOB, palpitations, N/V, abdominal pain, c/d or urinary symptoms. Worked with PT/OT yesterday. Plan for discharge today after transfuse 1 unit pRBCs. HPI:   Allyson Osuna. Alejandra Stinson is a 61 yo F with PMH CKD4, DVT (2004; on Eqliquis), T2DM c/b b/l lower extremity ulcers (follows with Podiatry outpatient, last visit 9/27/21), HTN, narcotic-dependent chronic pain (on Methadone 140 mg/day) who presented for progressively worsening b/l lower extremity pain for the 2 weeks. Patient states she presented today with pain in legs so severe that it is now inhibiting her ability to ambulate. She endorses that the legs are also more swollen and warm to touch with pain when she touches it. She endorses she tries to eat a healthy diet low in salt, she also denies any major weight fluctuations recently.     On ROS, denies fever/chills, nausea/vomiting, diarrhea/constipation, urinary issues including dysuria, hematuria, or changes to amount or flow of urine. She denies abdominal pain.      On arrival to [facility] ED, VSS. Patient appeared extremely sleepy on physical exam. Chemistry significant for Na 133, BUN 42/Cr 2.5. Pro-BNP elevated 4,733 (baseline 2-3k). Trops 0.04. CBC with Hgb 9.1 Hct 28.5 at baseline. CXR with mild cardiomegaly and interstitial edema.  She was given IV Lasix 40 in the ED and admitted to the floor.        Medications:     Scheduled Meds:   vancomycin (VANCOCIN) intermittent dosing (placeholder)   Other See Admin Instructions    methadone  120 mg Oral Daily    General: She is not in acute distress. HENT:      Head: Normocephalic and atraumatic. Nose: No congestion or rhinorrhea. Mouth/Throat:      Mouth: Mucous membranes are moist.      Pharynx: No oropharyngeal exudate or posterior oropharyngeal erythema. Eyes:      General: No scleral icterus. Conjunctiva/sclera: Conjunctivae normal.   Cardiovascular:      Rate and Rhythm: Normal rate and regular rhythm. Heart sounds: No murmur heard. No gallop. Pulmonary:      Effort: No respiratory distress. Breath sounds: No wheezing or rales. Abdominal:      General: There is no distension. Palpations: Abdomen is soft. Tenderness: There is no abdominal tenderness. There is no guarding. Musculoskeletal:      Cervical back: Neck supple. No tenderness. Right lower leg: Edema present. Left lower leg: Edema present. Comments: Chronic venous changes on b/l LE, mild tenderness, significant swelling. B/L feet with wounds and post-surgical changes, not purulent    Skin:     General: Skin is dry. Capillary Refill: Capillary refill takes less than 2 seconds. Neurological:      General: No focal deficit present. Mental Status: She is alert and oriented to person, place, and time. LABS:    CBC:   Recent Labs     10/14/21  0510 10/15/21  0632   WBC 7.2 7.2   HGB 7.6* 7.3*   HCT 23.8* 22.5*    327   MCV 86.9 87.1     Renal:    Recent Labs     10/14/21  0510 10/14/21  1624 10/15/21  0632 10/15/21  2021 10/16/21  0611     --  139 136 139   K 6.1*   < > 5.3* 4.8 4.9     --  105 102 108   CO2 27  --  25 25 24   BUN 37*  --  41* 42* 39*   CREATININE 1.7*  --  2.5* 2.3* 2.0*   GLUCOSE 95  --  68* 87 152*   CALCIUM 8.5  --  8.7 8.6 8.0*   MG 2.00  --  2.00  --  1.70*   PHOS  --   --   --  4.9  --    ANIONGAP 6  --  9 9 7    < > = values in this interval not displayed.      Hepatic:   Recent Labs     10/15/21  2021   LABALBU 2.3*     Troponin:   No results for input(s): TROPONINI in the last 72 hours. BNP: No results for input(s): BNP in the last 72 hours. Lipids: No results for input(s): CHOL, HDL in the last 72 hours. Invalid input(s): LDLCALCU, TRIGLYCERIDE  ABGs:    Recent Labs     10/13/21  1521 10/13/21  2311 10/14/21  1333   PHART 7.263* 7.304* 7.242*   MHU0NHX 64.0* 56.7* 68.5*   PO2ART 53.5* 125.0* 36.3*   NUU9WGH 29 28 29   BEART 2.0 1.6 1.3   S2BCTRTQ 85* 100 63*   IHC8KXM 31 30 32       INR:   No results for input(s): INR in the last 72 hours. Lactate: No results for input(s): LACTATE in the last 72 hours. Cultures:  -----------------------------------------------------------------  RAD:   XR FOOT RIGHT (MIN 3 VIEWS)   Final Result   1. Partial amputation the region of the cuboid with soft tissue defect and extensive soft tissue swelling. No acute complication identified. XR FOOT RIGHT (2 VIEWS)   Final Result      Expected postsurgical changes of right fourth ray and partial cuboid resection. XR FOOT LEFT (2 VIEWS)   Final Result      Expected postsurgical changes of left fifth ray resection. VL Extremity Venous Bilateral   Final Result      IR TUNNELED CVC PLACE WO SQ PORT/PUMP > 5 YEARS   Final Result   Impression:      Successful placement of a tunneled central venous catheter via the right internal jugular vein. The catheter is ready for immediate use. VL DUP LOWER EXTREMITY ARTERIES BILATERAL   Final Result      MRI FOOT LEFT WO CONTRAST   Final Result   Osteomyelitis involving the fifth metatarsal and fifth proximal phalanx with extensive osseous destruction. Mild osteomyelitis involving the lateral aspect of the cuboid. Prior partial first digit amputation. MRI FOOT RIGHT WO CONTRAST   Final Result   Multiple prior amputations.    Soft tissue ulceration lateral to the cuboid with mild acute osteomyelitis involving the lateral base of the fourth metatarsal and more distal plantar aspect of the remaining fourth outpatient on 9/27/21, do not appear acutely infected. Patient on Clinda and Cipro. XR of both feet show osteomyelitis involving 5th phalanges and fifth metatarsal. S/P bilateral lower extremity I&D with fifth ray resection left foot, fourth ray resection right foot, partial cuboid resection right foot. Patient went to OR 10/13 for repeat I&D with delayed primary closure of right lower extremity and I&D with partial cuboid resection and wound vac application.  - Surgical path from 10/7 overall showed partially treated osteomyelitis  - Surgical path from 10/13 pending  - Currently on vanc, merrem and fluconazole  - Will need IV vanc and ertapenem (until 11/26) at discharge per ID  - Podiatry following; wound care per Podiatry  - Wound cultures grew pseudomonas, E coli and enterococcus   - PT = 9/24  - OT = 13/24    FAHEEM on CKD 4  Baseline creat is appears around 2. Hypoalbuminemia, Albumin 2.6 on presentation. Suspect from diabetic nephropathy in setting of poorly controlled diabetes. HbA1c 10/2021 at 8.3. Low BP may also be playing a part. - Kidney function had acutely worsened, likely 2/2 diuretics and hypotension. Has improved over the last 24 hours. Cr now 2.0.   - Avoid nephrotoxic agents  - Nephrology following  - Hold cardura and lasix per nephro for now     Anemia  Likely 2/2 blood loss in OR in the setting of AOCD from CKD. Iron 16, iron sat 9, transferrin 7.8, TIBC 174. Requiring 4 uRBC transfusion so far. - Monitor CBC daily, H/H goal > 7  - Drop in Hgb after surgery, now down to 6.9  - Transfuse 1 unit pRBCs    Chronic diastolic Heart Failure  Last Echo 2018 with EF 50-55%. Evidence of PAH which may be contributing. CXR 10/1 with mild cardiomegaly and interstitial edema. Suspect rt heart failure from undiagnosed sleep apnea.  Echo with EF 55%, LVH and estimated pulm artery systolic pressure is at 60 mmHg   - Lasix 20 mg IV daily held for worsening FAHEEM  - strict I/O's, daily weights   - Low salt diet    Hyperkalemia (resolved)  Hyperkalemia frequently present on labs during this admission.   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  - continue to monitor      T2DM   BS 91 - recent outpatient visit records show good blood sugar control. HbA1c on  10/2021 at 8.3.  - Counseled on wt loss, exercise, good diet and medication compliance  - Continue lantus 12 units plus HDSS, hypoglycemia protocol     Hx of Opioid Pain Medication Use, Now on Methadone   Patient seen at [facility] (reached at [phone]) and is prescribed methadone 140 mg. Patient reports she has been going to a methadone clinic for about 20 years. - Decreased Methadone 120 mg daily   - Will attempt to contact clinic again today to see how long she has been on methadone and to confirm dosage    Possible RASHMI  Patient's with high BMI and evidence of PAH on echo. Patient would de-sat overnight while sleeping. Very sleepy during the day. - Encouraged to get sleep study as an outpatient          Code Status: Full Code   FEN: ADULT DIET; Regular; 3 carb choices (45 gm/meal); Low Sodium (2 gm); Low Potassium (Less than 3000 mg/day)  Adult Oral Nutrition Supplement;  Wound Healing Oral Supplement   PPX: heparin TID   DISPO: Discharge today to SNF after blood transfusion    Kevin Hsu MD, PGY-1  10/16/21  7:17 AM    This patient has been staffed and discussed with Cali Reyna MD.

## 2021-10-16 NOTE — PROGRESS NOTES
Podiatric Surgery Daily Progress Note  Yinka Heading      Subjective :   Patient seen and examined this pm at the bedside. Patient denies any acute overnight events. Patient denies N/V/F/C/SOB. Patient denies calf pain, thigh pain, chest pain. Review of Systems: A 12 point review of symptoms is unremarkable with the exception of the chief complaint. Patient specifically denies nausea, fever, vomiting, chills, shortness of breath, chest pain, abdominal pain, constipation or difficulty urinating. Objective     BP (!) 153/70   Pulse 57   Temp 96.7 °F (35.9 °C) (Oral)   Resp 16   Ht 5' 2\" (1.575 m)   Wt 228 lb 2.8 oz (103.5 kg) Comment: night nurse did it forgot to place it  LMP 10/23/2015   SpO2 94%   BMI 41.73 kg/m²     I/O:    Intake/Output Summary (Last 24 hours) at 10/16/2021 0653  Last data filed at 10/16/2021 0200  Gross per 24 hour   Intake 2067 ml   Output 1110 ml   Net 957 ml              Wt Readings from Last 3 Encounters:   10/15/21 228 lb 2.8 oz (103.5 kg)   08/12/21 200 lb (90.7 kg)   06/18/21 218 lb 0.6 oz (98.9 kg)       LABS:    Recent Labs     10/14/21  0510 10/15/21  0632   WBC 7.2 7.2   HGB 7.6* 7.3*   HCT 23.8* 22.5*    327        Recent Labs     10/15/21  2021      K 4.8      CO2 25   PHOS 4.9   BUN 42*   CREATININE 2.3*        No results for input(s): PROT, INR, APTT in the last 72 hours. LOWER EXTREMITY EXAMINATION    Dressing to bilateral lower extremities left clean, dry, intact at this time. No strikethrough drainage noted to external layers of dressing. Wound VAC to right lower extremity running continuous at 125 mmHg. Scant sanguinous drainage noted to wound VAC canister. No pain with calf compression of b/l LE.      IMAGING:  X-ray right foot 10/13-postop  Narrative   History: Status post partial cuboid amputation. Right foot infection.       3 views right foot.       FINDINGS: There is a large defect along bilateral foot with wound VAC device. Extensive soft tissue swelling. Resection of much of the cuboid. No acute complication identified.       Impression   1. Partial amputation the region of the cuboid with soft tissue defect and extensive soft tissue swelling. No acute complication identified. MRI right foot 10/3/2021  Narrative   EXAMINATION: Magnetic resonance imaging (MRI) of the right foot without contrast       HISTORY: Osteomyelitis 5th metatarsal base; rule out OM cuboid       TECHNIQUE: MRI of the right foot was performed using multiple pulse sequences in multiple planes without contrast.       COMPARISON: Radiograph dated 10/2/2021       FINDINGS:        There have been prior first second third MTP joint, fourth transmetatarsal, and fifth TMT joint amputations. There is a soft tissue ulcer lateral to the base of the fourth metatarsal with adjacent T1 hypointensity in marrow edema involving the base of    the fourth metatarsal as well as the lateral aspect of the cuboid representing osteomyelitis. There is also soft tissue swelling more distally along the plantar aspect of the remaining fourth metatarsal shafts with osteomyelitis along the plantar cortex    of the remaining fourth metatarsal. There is no acute fracture or dislocation. There is a degenerative subchondral cyst in the navicular. No abscess is seen within limits of noncontrast examination. Visualized tendons and plantar fascia are unremarkable.       Impression   Multiple prior amputations.    Soft tissue ulceration lateral to the cuboid with mild acute osteomyelitis involving the lateral base of the fourth metatarsal and more distal plantar aspect of the remaining fourth metatarsal shaft as well as the lateral aspect of the cuboid.        MRI left foot 10/3/2021  Narrative   EXAMINATION: Magnetic resonance imaging (MRI) of the left foot without contrast       HISTORY: Osteomyelitis 5th metatarsal       TECHNIQUE: MRI of the left foot was performed using multiple pulse sequences in multiple planes without contrast.       COMPARISON: Radiograph dated 10/2/2021       FINDINGS:        There has been prior amputation of the first digit at the level of the base of the first proximal phalanx. There is soft tissue ulceration lateral to the fifth mid metatarsal with surrounding edema and phlegmon in the subcutaneous tissues. There is    extensive destruction of the mid to distal aspect of the fifth metatarsal and base of the fifth proximal phalanx representing sequelae of osteomyelitis. There is also mild osteomyelitis extending to the base of the fifth metatarsal as well as along the    lateral aspect of the cuboid.       Impression   Osteomyelitis involving the fifth metatarsal and fifth proximal phalanx with extensive osseous destruction. Mild osteomyelitis involving the lateral aspect of the cuboid. Prior partial first digit amputation.      Lower extremity arterial duplex 10/4/2021  Right   Right CRISTIAN was not available due to patient non-compliance . There are multiphasic waveforms in the common femoral artery indicating no   aortoiliac inflow disease. There is atherosclerotic plaque involving the superficial femoral and   popliteal arteries with no significantly elevated velocities. Left   Left CRISTIAN was not available due to patient non-compliance . There are multiphasic waveforms in the common femoral artery indicating no   aortoiliac inflow disease. There is atherosclerotic plaque involving the superficial femoral and   popliteal arteries with no significantly elevated velocities. The peroneal artery is not visualized. There is no previous exam for comparison.        ASSESSMENT/PLAN   -s/p I&D with partial cuboid resection and application of wound vac to the right foot and I&D of the left foot with delayed primary closure of the left lower extremity.  10/13/2021  -S/p bilateral lower extremity I&D with left fifth ray resection, right fourth ray resection, right partial cuboid resection, 10/7/2021  -Diabetic foot ulceration with osteomyelitis, Carpenter 3, right lower extremity  -PVD, bilateral lower extremity  -Edema, bilateral lower extremity  -Diabetes mellitus type 2 with peripheral neuropathy  -History of noncompliance with weightbearing status    -Patient examined and evaluated at the bedside   -Hypertensive otherwise VSS.  No leukocytosis noted (WBC 6.0). -ESR >120, CRP 205.1  -Imaging reviewed, noted above  -Bone biopsy(10/7) right foot; right fourth metatarsal with OM, right cuboid with OM, right cuboid clearance fragment with partially treated OM with degenerative changes  -Bone biopsy (10/7) left foot; partially treated OM of the 5th digit, 5th metatarsal with partially treated OM, left cuboid clearance fragment with degenerative changes with no evidence of OM  -Wound culture right foot (10/2): Pseudomonas aeruginosa, E coli, Enterococcus faecalis   -Surgical culture right cuboid (10/13); no growth to date  -Surgical path right cuboid (10/13); bone fragments with reactive change, no evidence of OM  -Dressing to b/l LE left clean, dry, intact at this time  -Wound VAC running continuous at 125mmHg to right LE.   -Continue antibiotics per ID recommendations. Final ID recs vancomycin and Invanz through 11/26  -Non-weightbearing bilateral lower extremity  -Patient will follow up in the [facility] outpatient Podiatry clinic Monday, 10/18. DISPO: s/p I&D with partial cuboid resection and application of wound vac to the right foot and I&D of the left foot with delayed primary closure of the left lower extremity. Nonweight bearing to b/l LE. ID following; final ID recs noted above. No further surgical intervention needed from a podiatry standpoint. Patient OK to discharge from podiatry standpoint pending medical clearance.      Discussed assessment and plan with Dr. Michele Bernal DPM.    Shelbie Mercado DPM  Podiatric Resident, PGY-2  Pager #: [phone] or Perfect Serve

## 2021-10-16 NOTE — PROGRESS NOTES
Clinical Pharmacy Progress Note    Vancomycin - Management by Pharmacy    Consult Date(s): 10/2/21  Consulting Provider(s): Dr. Otoniel Sampson / Plan  1) Osteomyelitis of B/L feet - Vancomycin   Concurrent Antimicrobials:  Meropenem    Day of Vanc Therapy: day #15   Current Dosing Method:  Intermittent  o Therapeutic Goal: Levels 15-20 mcg/mL   Current Dose / Frequency / Plan / Rationale:   o Currently on vancomycin 750 mg IV q24h. o SCr fluctuation (1.7?2.5?2.3?2.0), likely 2/2 FAHEEM that is resolving  o Random level this AM = 17.8 mcg/mL - drawn ~18h after previous dose.   o Bayesian kinetics predicts AUC of 530 mg/L*h with steady state trough of 18.8 mcg/mL. o Per ID orders, patient will be on vancomycin 750 mg daily through 11/26. Will order one time dose since patient is discharging today.  Clinical condition will be monitored closely, and levels will be ordered as clinically indicated. Thanks for consulting pharmacy! Please call with questions [phone] [facility]. . Pharmacy Resident   10/16/2021 12:09 PM      Interval Update: SCr trending back towards baseline. Per ID, patient is going to be on vancomycin 750mg daily through 11/26. Subjective/Objective:   César Gillette is a 60 y.o. female with a PMHx significant for CKD4, DVT (2004) on apixaban, DM2, chronic B/L LE ulcers, HTN, and chronic pain on methadone who is admitted with B/L LE osteomyelitis. Pt underwent B/L LE I&D with left 5th ray resection, right 4th ray resection, and rt partial cuboid resection on 10/7. Pharmacy is consulted to dose vancomycin.     Current antibiotics:  Fluconazole 200mg IV q24h (10/2-current)  Meropenem 1000mg IV q12h (10/2-current)  Vancomycin - Pharmacy to dose   (10/2-10/10)    (10/10-10/15)   Intermittent dosing (10/15-10/16)   750mg IV x1 prior to discharge (10/16)      Ht Readings from Last 1 Encounters:   10/14/21 5' 2\" (1.575 m)       Wt Readings from Last 1 Encounters:   10/15/21 228 lb 2.8 oz (103.5 kg)     Vancomycin Level(s) / Doses:    Date Time Dose Level / Type of Level Interpretation   10/2 1401 2250 mg IV x1     10/3 0712   Random = 24.5 mcg/mL Drawn ~19 hrs after previous dose given. Hold dose. 10/4 0829  Random =18.1 mcg/mL  Re-dose 500 mg IV x1    1231 500 mg IV x1     10/5 0307  Random = 18.8 mcg/mL Drawn 14.5 hrs after dose    1231 500 mg IV x1  Re-dose with 500 mg IV x 1   10/6 0849  Random = 18.6 mcg/mL Drawn 18h after dose. Will re-dose 500 mg IV x1    1511 500 mg IV x 1     10/7 0520  Random = 15.6 mcg/mL Switch to AUC-based dosing. Schedule vancomycin 750 mg IV Q24 hours and re-assess random 10/8.    1705 750 mg IV x 1     10/8 0454  Random = 18.6 mcg/mL Continue current regimen of 750 mg IV Q24h. Predicted to achieve AUC of 538 mg/L*hr.   10/8 15:25 750mg IV x1     10/9 14:18  Random = 18.6 mcg/mL Drawn ~24 hrs after previous dose.      10/9 16:40 500mg IV x1     10/10 16:58 750mg IV x1     10/11 05:21 750mg IV q24h Random = 23.6 mcg/mL Drawn ~12 hr after prior dose given, predicted  on 750mg q24h   10/14 05:10 750mg IV q24h 15.8 mcg/mL - Random · Drawn ~36h after previous dose (dose not given 10/13)  · Predicted  mg/L*h    10/15 06:32 750 mg IV q24h 18.5 mcg/mL - Random · Drawn ~14h after prior dose given  · Predicted AUC = 630 mg/L*h   10/16 06:11 1750mg IV q24h Random = 17.8 · Drawn ~18 hours after last dose  · Predicted AUC= 530 mg/L*hr     Cultures & Sensitivities:  Date Site Micro Susceptibility / Result   10/2 Foot tissue, right Pseudomonas aeruginosa     S: cefepime, gent, meropenem, Zosyn, tobramycin  R: Cipro     Foot tissue, right E. coli  S: Cefepime, ceftriaxone, cefuroxime, ertapenem, gent, meropenem, Zosyn, Bactrim  R: Cipro    Foot tissue, right Enterococcus faecalis S: Ampicillin, Vancomycin      Foot tissue, right Providencia stuartii No further w/u   10/2 Foot wound, unclear R/L Pseudomonas aeruginosa Same as above    Foot wound, unclear R/L E.coli Same as above    Foot wound, unclear R/L Enterococcus faecalis Same as above   10/7 Tissue foot, right Pseudomonas aeruginosa S: cefepime, gent, tobramycin, meropenem, Zoysn  R: cipro     Labs / Ancillary Data:    Estimated Creatinine Clearance: 35 mL/min (A) (based on SCr of 2 mg/dL (H)). Recent Labs     10/14/21  0510 10/14/21  0510 10/15/21  7632 10/15/21  2021 10/16/21  0611   CREATININE 1.7*   < > 2.5* 2.3* 2.0*   BUN 37*   < > 41* 42* 39*   WBC 7.2  --  7.2  --  6.0    < > = values in this interval not displayed.

## 2021-10-18 PROBLEM — G93.40 ACUTE ENCEPHALOPATHY: Status: ACTIVE | Noted: 2021-01-01

## 2021-10-18 NOTE — PROGRESS NOTES
Admitted to [phone]- pt difficult to arouse (arouses to touch/gentle pushing). VSS on 6L. Lab drawing second blood cx currently. Sugar 103. Excoriation noted under breasts and under abd skin folds. Sherle Aiyln in place and abraham care given. Bed alarm on. Camera in place.  Will monitor

## 2021-10-18 NOTE — TELEPHONE ENCOUNTER
Called patient left message to call clinic to schedule POD appt for Monday also not to change bandage on left foot.

## 2021-10-18 NOTE — ED TRIAGE NOTES
PT reported unresponsive at nursing facility. Pt altered upon arrival, shouting and being violent with staff. Pt alter to self.  PT hypoxic upon arrival, placed on nonrebreather

## 2021-10-18 NOTE — PROGRESS NOTES
Pt transferred to [facility]. Responds to pain only. SB 55 on tele. On 6 L NC. Fall precautions. Camera on.

## 2021-10-18 NOTE — PROGRESS NOTES
4 Eyes Admission Assessment     I agree as the admission nurse that 2 RN's have performed a thorough Head to Toe Skin Assessment on the patient. ALL assessment sites listed below have been assessed on admission. Areas assessed by both nurses:   [x]   Head, Face, and Ears   [x]   Shoulders, Back, and Chest  [x]   Arms, Elbows, and Hands   [x]   Coccyx, Sacrum, and Ischium  [x]   Legs, Feet, and Heels        Does the Patient have Skin Breakdown? No         Ramirez Prevention initiated:  No   Wound Care Orders initiated:  No      [facility] nurse consulted for Pressure Injury (Stage 3,4, Unstageable, DTI, NWPT, and Complex wounds) or Ramirez score 18 or lower:  No    Redness/excoriation to abd folds and breasts. Redness under 02/scratches on face. Redness under IV site.       Nurse 1 eSignature: Electronically signed by Ruben Bradley RN on 10/18/21 at 4:11 PM EDT    **SHARE this note so that the co-signing nurse is able to place an eSignature**    Nurse 2 eSignature: Electronically signed by Mario Weber RN on 10/18/21 at 5:51 PM EDT

## 2021-10-18 NOTE — Clinical Note
Patient Class: Inpatient [101]   REQUIRED: Diagnosis: Acute encephalopathy [[id number]]   Estimated Length of Stay: Estimated stay of more than 2 midnights   Admitting Provider: Charito Madison [[id number]]

## 2021-10-18 NOTE — ED NOTES
Bed: A05-05  Expected date: 10/18/21  Expected time:   Means of arrival:   Comments:   Vasquez Amaro RN  10/18/21 3121

## 2021-10-18 NOTE — ED PROVIDER NOTES
[address] [facility]          ATTENDING PHYSICIAN NOTE       Date of evaluation: 10/18/2021    Chief Complaint     Altered Mental Status (Pt reportedly unresponsive at [address] [facility] , pt given glugagon and narcan en route, Pt responsive upon arrival but only with inappropriate sounds )      History of Present Illness     Casper Handley is a 60 y.o. female with history of obesity, RASHMI, systolic CHF, bilateral lower foot wound status post partial amputation, CKD, chronic pain on methadone, diabetes, multiple other comorbidities presenting for altered mental status. Patient presents from her nursing facility, is unable to provide any significant history. Per EMS and the facility, she was found minimally responsive shortly after receiving her typical 140 mg of methadone this morning. Her glucose was also found to be in the 30s upon EMS arrival and she was given glucagon with improvement. She also received Narcan 2 mg with improvement in her mental status. She was noted to be hypoxic and was placed on nonrebreather oxygen. Patient cannot provide any further history. Notably, patient was recently admitted for CHF exacerbation and discharged 2 days ago. She was noted to have significant fluctuations in her mental status during the hospital stay including need for BiPAP due to hypercarbia and hypoxia. Review of Systems   Unable to obtain given altered mental status. Physical Exam     INITIAL VITALS: BP: (!) 192/93, Temp: 97.8 °F (36.6 °C), Pulse: 84, Resp: 23, SpO2: 94 %     Nursing note and vitals reviewed. General:  Adult female, agitated and confused. In no distress. HENT: Normocephalic and atraumatic. External ears normal. Nose appears normal externally. Eyes: Conjunctivae normal. No scleral icterus. Neck: Neck supple. No tracheal deviation present. CV: Normal rate. Regular rhythm. S1/S2 auscultated. No murmurs, gallops or rubs.   Tunneled catheter in right chest with dressing and site clean/dry/intact. Pulm: Effort normal on NRB. Diffuse coarse breath sounds. GI: Soft. No distension. No tenderness. No rebound or guarding. No masses. No peritoneal signs. Musculoskeletal: Bilateral feet with partial amputations. Wounds appear intact and without dehiscence. No drainage, dressings are dry. No edema. No gross deformities. Neurological: Agitated and yelling, oriented to name only. Intermittently becomes quite somnolent. Moving all extremities spontaneously. No facial asymmetry. Skin: Warm, dry. No rash. No diaphoresis or erythema. Procedures   Procedures    MEDICAL DECISION MAKING     MDM: Yovany Burris is a 60 y.o. female with history as above presenting for altered mental status. On arrival, she is agitated and yelling, minimally cooperative with exam, hypoxic into the 80s on room air and does improve with nonrebreather mask. She has diffuse coarse breath sounds in her recent bilateral foot partial amputations are healing well on my examination. Glucose is normal.  She did require Haldol and Versed to help with her agitation. Was concern for possible hypercapnia given her depressed mental status shortly after methadone administration, however she has a compensated mild respiratory acidosis that appears more chronic. Doubt seizure or worsening infection. No evidence of arrhythmia. Lactate is normal.  She does however remain hypoxic and has diffuse pulmonary infiltrates on chest x-ray. I suspect she is in worsening volume overload again and was administered IV Lasix. She has continued on vancomycin and meropenem since discharge, and I doubt worsening infection that is untreated at this time. Blood cultures were obtained. CT head without acute findings. Oxygen has been weaned to simple mask but she does continue to have hypoxic respiratory failure. She will need some time for medications to be held and mental status to be carefully monitored.   Will discuss with the hospitalist for further care and management. Critical Care:  Due to the immediate potential for life-threatening deterioration due to altered mental status, hypoxic respiratory failure, I spent 35 minutes providing critical care. Thistime excludes time spent performing procedures but includes time spent on direct patient care, history retrieval, review of the chart, and discussions with patient, family, and consultant(s). Clinical Impression     1. Encephalopathy    2. Acute hypoxemic respiratory failure (HCC)    3. Systolic CHF, acute on chronic [facility])        Disposition     DISPOSITION Admitted 10/18/2021 02:09:38 PM        Latoya Garcia MD  2:18 PM                     Past Medical, Surgical, Family, and Social History     She has a past medical history of Asthma, Bacterial vaginosis, Carpal tunnel syndrome, COPD (chronic obstructive pulmonary disease) ([facility] [address] Diabetes mellitus type II, Diabetic neuropathy ([facility] [address] Diastolic CHF ([address] DVT (deep venous thrombosis) ([facility] [address] Dyslipidemia, Dyspareunia, ETOH abuse, Feet clawing, HTN (hypertension), Hx of blood clots, Hyperlipidemia, MRSA (methicillin resistant staph aureus) culture positive, Neuropathy, Pancreatitis, Scalp lesion, Tobacco abuse, Uses walker, Uses wheelchair, and Wears dentures. She has a past surgical history that includes  section (unknown); pre-malignant / benign skin lesion excision (7003); other surgical history (Left, 2016); Toe amputation (Left, 2017); other surgical history (Right, 10/20/2017); other surgical history (Right, 2018); pr debridement, skin, sub-q tissue,muscle,bone,=<20 sq cm (Right, 2018); Foot Debridement (Right, 2019); Foot Debridement (Right, 2019); Foot Debridement (Right, 2019); Foot Debridement (Left, 10/23/2019); Tonsillectomy; knee surgery (Left); Foot Debridement (Right, 3/29/2021); IR TUNNELED CVC PLACE WO SQ PORT/PUMP > 5 YEARS (10/5/2021);  Foot Debridement (10/7/2021); and Foot Debridement (Bilateral, 10/13/2021). Her family history is not on file. She reports that she quit smoking about 3 years ago. Her smoking use included cigarettes. She has a 30.00 pack-year smoking history. She has never used smokeless tobacco. She reports that she does not drink alcohol and does not use drugs. Medications     Previous Medications    AMITRIPTYLINE (ELAVIL) 100 MG TABLET    TAKE 1 TABLET BY MOUTH EVERY NIGHT AT BEDTIME    AMMONIUM LACTATE (LAC-HYDRIN) 12 % LOTION    Apply topically daily Apply to both legs daily    ASPIRIN EC 81 MG EC TABLET    Take 1 tablet by mouth daily    ATORVASTATIN (LIPITOR) 20 MG TABLET    Take 1 tablet by mouth nightly    BLOOD GLUCOSE TEST STRIPS (TRUE METRIX BLOOD GLUCOSE TEST) STRIP    1 each by In Vitro route 2 times daily As needed. ELIQUIS 5 MG TABS TABLET    TAKE 1 TABLET BY MOUTH TWICE DAILY    ESCITALOPRAM (LEXAPRO) 10 MG TABLET    Take 1 tablet by mouth daily    FUROSEMIDE (LASIX) 40 MG TABLET    Take 1 tablet by mouth daily    GABAPENTIN (NEURONTIN) 400 MG CAPSULE    Take 1 capsule by mouth 2 times daily for 90 days. GAUZE PADS & DRESSINGS MISC    Please dispense 4x8 guaze, kerlix, and ace    HYDRALAZINE (APRESOLINE) 50 MG TABLET    Take 1 tablet by mouth every 8 hours    INSULIN ASPART (NOVOLOG FLEXPEN) 100 UNIT/ML INJECTION PEN    Inject 8 Units into the skin 3 times daily (before meals)    INSULIN GLARGINE (BASAGLAR KWIKPEN) 100 UNIT/ML INJECTION PEN    Inject 50 Units into the skin daily    INSULIN PEN NEEDLE 31G X 5 MM MISC    1 each by Does not apply route daily    LANCETS MISC    1 each by Does not apply route 2 times daily PHARMACY MAY SUBSTITUTE TO TRUE METRIX LANCETS    MEROPENEM (MERREM) 1 G INJECTION    Inject 1,000 mg into the muscle every 12 hours    METHADONE (DOLOPHINE) 10 MG/ML SOLUTION    Take 140 mg by mouth daily.  Ver[facility] dose with [facility] 320 [phone]) 6/18/21    METOPROLOL SUCCINATE ([place] XL) 50 MG EXTENDED RELEASE TABLET    Take 1 tablet by mouth daily    NYSTATIN (MYCOSTATIN) 645962 UNIT/GM POWDER    Apply topically 2 times daily Apply to right breast and groin area BID    OMEPRAZOLE (PRILOSEC) 20 MG DELAYED RELEASE CAPSULE    Take 1 capsule by mouth every morning    VANCOMYCIN (VANCOCIN) 750 MG INJECTION    Infuse 750 mg intravenously daily       Allergies     She is allergic to sulfa antibiotics. ED Course     Nursing Notes, Past Medical Hx, Past Surgical Hx, Social Hx,Allergies, and Family Hx were reviewed. Patient was given the following medications:  Orders Placed This Encounter   Medications    DISCONTD: haloperidol lactate (HALDOL) injection 2.5 mg    haloperidol lactate (HALDOL) injection 2.5 mg    midazolam (VERSED) injection 2 mg    haloperidol lactate (HALDOL) injection 2.5 mg    furosemide (LASIX) injection 40 mg       Diagnostic Results     EKG   Difficult to interpret secondary to baseline wander from agitation. Narrow QRS rhythm, likely sinus. Axis normal.  Intervals undetermined. No definitive ST or T wave changes suggestive of acute ischemia. RECENT VITALS:  BP: (!) 135/59,Temp: 97.8 °F (36.6 °C), Pulse: 63, Resp: 16, SpO2: 94 %     RADIOLOGY:  XR CHEST PORTABLE   Final Result      1. Moderately severe diffuse bilateral airspace disease, new/increased from prior study. 2. Cardiomegaly. CT Head WO Contrast   Final Result      1. No acute intracranial hemorrhage or mass effect.           LABS:   Results for orders placed or performed during the hospital encounter of 10/18/21   CBC Auto Differential   Result Value Ref Range    WBC 12.9 (H) 4.0 - 11.0 K/uL    RBC 3.29 (L) 4.00 - 5.20 M/uL    Hemoglobin 9.3 (L) 12.0 - 16.0 g/dL    Hematocrit 28.2 (L) 36.0 - 48.0 %    MCV 85.9 80.0 - 100.0 fL    MCH 28.2 26.0 - 34.0 pg    MCHC 32.8 31.0 - 36.0 g/dL    RDW 17.2 (H) 12.4 - 15.4 %    Platelets 222 881 - 844 K/uL    MPV 7.6 5.0 - 10.5 fL    Neutrophils % 87.9 % Lymphocytes % 6.6 %    Monocytes % 4.8 %    Eosinophils % 0.4 %    Basophils % 0.3 %    Neutrophils Absolute 11.4 (H) 1.7 - 7.7 K/uL    Lymphocytes Absolute 0.9 (L) 1.0 - 5.1 K/uL    Monocytes Absolute 0.6 0.0 - 1.3 K/uL    Eosinophils Absolute 0.1 0.0 - 0.6 K/uL    Basophils Absolute 0.0 0.0 - 0.2 K/uL   Comprehensive Metabolic Panel w/ Reflex to MG   Result Value Ref Range    Sodium 140 136 - 145 mmol/L    Potassium reflex Magnesium 4.6 3.5 - 5.1 mmol/L    Chloride 103 99 - 110 mmol/L    CO2 29 21 - 32 mmol/L    Anion Gap 8 3 - 16    Glucose 78 70 - 99 mg/dL    BUN 28 (H) 7 - 20 mg/dL    CREATININE 1.4 (H) 0.6 - 1.1 mg/dL    GFR Non-African American 39 (A) >60    GFR  47 (A) >60    Calcium 9.3 8.3 - 10.6 mg/dL    Total Protein 6.7 6.4 - 8.2 g/dL    Albumin 2.7 (L) 3.4 - 5.0 g/dL    Albumin/Globulin Ratio 0.7 (L) 1.1 - 2.2    Total Bilirubin <0.2 0.0 - 1.0 mg/dL    Alkaline Phosphatase 149 (H) 40 - 129 U/L    ALT 16 10 - 40 U/L    AST 20 15 - 37 U/L    Globulin 4.0 Not Established g/dL   Lactate, Sepsis   Result Value Ref Range    Lactic Acid, Sepsis 0.9 0.4 - 1.9 mmol/L   Urinalysis Reflex to Culture    Specimen: Urine voided   Result Value Ref Range    Color, UA Yellow Straw/Yellow    Clarity, UA Clear Clear    Glucose, Ur Negative Negative mg/dL    Bilirubin Urine Negative Negative    Ketones, Urine Negative Negative mg/dL    Specific Gravity, UA 1.020 1.005 - 1.030    Blood, Urine TRACE-INTACT (A) Negative    pH, UA 7.0 5.0 - 8.0    Protein,  (A) Negative mg/dL    Urobilinogen, Urine 0.2 <2.0 E.U./dL    Nitrite, Urine Negative Negative    Leukocyte Esterase, Urine TRACE (A) Negative    Microscopic Examination YES     Urine Type NotGiven     Urine Reflex to Culture Not Indicated    Blood Gas, Venous   Result Value Ref Range    pH, Brody 7.402 7.350 - 7.450    pCO2, Brody 51.5 (H) 41.0 - 51.0 mmHg    pO2, Brody 47.2 (H) 25 - 40 mmHg    HCO3, Venous 32.0 (H) 24.0 - 28.0 mmol/L    Base Excess, Brody 6.3 (H) -2.0 - 3.0 mmol/L    O2 Sat, Brody 83 Not established %    Carboxyhemoglobin 1.7 (H) 0.0 - 1.5 %    MetHgb, Brody <1.0 0.0 - 1.5 %    TC02 (Calc), Brody 34 mmol/L    Hemoglobin, Brody, Reduced 16.40 %   Protime-INR   Result Value Ref Range    Protime 15.5 (H) 9.9 - 12.7 sec    INR 1.35 (H) 0.88 - 1.12   Microscopic Urinalysis   Result Value Ref Range    WBC, UA 6-9 (A) 0 - 5 /HPF    RBC, UA 11-20 (A) 0 - 4 /HPF    Epithelial Cells, UA 21-50 (A) 0 - 5 /HPF    Renal Epithelial, UA 0-1 0 - 1 /HPF    Bacteria, UA 2+ (A) None Seen /HPF    Yeast, UA Present (A) None Seen /HPF   POCT Glucose   Result Value Ref Range    POC Glucose 82 70 - 99 mg/dl    Performed on ACCU-CHEK    EKG 12 Lead   Result Value Ref Range    Ventricular Rate 81 BPM    Atrial Rate 79 BPM    P-R Interval 136 ms    QRS Duration 72 ms    Q-T Interval 444 ms    QTc Calculation (Bazett) 515 ms    P Axis 38 degrees    R Axis 49 degrees    T Axis 82 degrees    Diagnosis       EKG performed in ER and to be interpreted by ER physician. Confirmed by MD, ER ([id number]),  Helene Nayak ([phone]) on 10/18/2021 1:10:11 PM       CONSULTS:  IP CONSULT TO HOSPITALIST    PATIENT REFERRED TO:  No follow-up provider specified.     DISCHARGE MEDICATIONS:  New Prescriptions    No medications on file          Josemanuel Robertson MD  10/18/21 [id number]

## 2021-10-18 NOTE — TELEPHONE ENCOUNTER
Writer contacted ED provider Teresa Mcgowan   to inform of 30 day readmission risk. ED provider informed writer of possible readmission.     Call Back: If you need to call back to inform of disposition you can contact me at [phone]

## 2021-10-18 NOTE — PROGRESS NOTES
Advised Rn that ABG will be delayed due to STAT tx's in ER. Rn asked to draw ABG after ER tasks were complete.

## 2021-10-18 NOTE — PROGRESS NOTES
Clinical Pharmacy Progress Note  Medication History     Admit Date: 10/18/2021    List of of current medications patient is taking is complete. Home Medication list in EPIC updated to reflect changes noted below. Source of information: [facility] made to medication list:   Medications removed: (include reason, ex: therapy completed, inactive medication)   Albuterol   Oxycodone  Medications added:    Vancomycin   Meropenem  Medication doses adjusted:    Ammonium Lactate   Lizbet log   Nystatin  Other notes:    Per Nurse at [facility] pt last had her IV antibiotics this AM at 0730 (vancomycin and meropenem. Please call with any questions.   Froy Davila, PharmD  PGY-1 Pharmacy Resident  [id number]  10/18/2021 1:20 PM

## 2021-10-19 NOTE — PLAN OF CARE
Problem: Nutrition  Intervention: Swallowing evaluation  SLP completed evaluation. Please refer to notes in EMR.     Brittany Lynn M.A., [address]  Speech-Language Pathologist

## 2021-10-19 NOTE — PROGRESS NOTES
Physical Therapy/Occupational Therapy  Hold note        Referral received, chart reviewed. Pt currently on strict BR and per RN report she is not very responsive this am. Will hold PT and OT today and f/u 10/20/21.        Dustin Grady, PT  Silvia Diaz, MOT, OTR/L

## 2021-10-19 NOTE — PLAN OF CARE
Problem: Falls - Risk of:  Goal: Will remain free from falls  Description: Will remain free from falls  Outcome: Ongoing  Goal: Absence of physical injury  Description: Absence of physical injury  Outcome: Ongoing     Problem: OXYGENATION/RESPIRATORY FUNCTION  Goal: Patient will maintain patent airway  Outcome: Ongoing  Goal: Patient will achieve/maintain normal respiratory rate/effort  Description: Respiratory rate and effort will be within normal limits for the patient  Outcome: Ongoing     Problem: HEMODYNAMIC STATUS  Goal: Patient has stable vital signs and fluid balance  Outcome: Ongoing     Problem: FLUID AND ELECTROLYTE IMBALANCE  Goal: Fluid and electrolyte balance are achieved/maintained  Outcome: Ongoing     Problem: ACTIVITY INTOLERANCE/IMPAIRED MOBILITY  Goal: Mobility/activity is maintained at optimum level for patient  Outcome: Ongoing     Problem: Skin Integrity:  Goal: Will show no infection signs and symptoms  Description: Will show no infection signs and symptoms  Outcome: Ongoing  Goal: Absence of new skin breakdown  Description: Absence of new skin breakdown  Outcome: Ongoing     A/O x 4 now. 2 L NC. Assisted to a BP. Fall precautions. Seen by podiatry. Non- weight bearing to BLE. Dressings in place.

## 2021-10-19 NOTE — DISCHARGE INSTR - COC
Continuity of Care Form    Patient Name: Heidy Martínez   :  1965  MRN:  [id number]    Admit date:  10/18/2021  Discharge date:  10/21/21    Code Status Order: Full Code   Advance Directives:      Admitting Physician:  No admitting provider for patient encounter.   PCP: Claudia Brooks MD    Discharging Nurse: [facility] Unit/Room#: 3176/4498-98  Discharging Unit Phone Number: [phone]    Emergency Contact:   Extended Emergency Contact Information  Primary Emergency Contact: DANAE Gaviria [address] Phone: [phone]  Relation: Parent  Secondary Emergency Contact: Jenancy Galvan[address] Phone: [phone]  Relation: Child    Past Surgical History:  Past Surgical History:   Procedure Laterality Date     SECTION  unknown    FOOT DEBRIDEMENT Right 2019    INCISION AND DRAINAGE WITH APPLICATION OF STRAVIX GRAFT RIGHT FOOT performed by Juma Neal DPM at [address] Right 2019    RIGHT FOOT INCISION AND DRAINAGE WITH STAGING TRANSMETATARSAL AMPUTATION performed by Juma Neal DPM at [address] Right 2019    RIGHT FOOT DEBRIDEMENT INCISION AND DRAINAGE, OPEN DIABETIC FOOT ULCER WITH GRAFT PLACEMENT performed by Juma Neal DPM at [address] Left 10/23/2019    LEFT FOOT INCISION AND DRAINAGE , DEBRIDEMENT OF OPEN WOUND, APPLICATION OF STRAVIX GRAFT performed by Juma Neal DPM at [address] Right 3/29/2021    INCISION AND DRAINAGE, DEBRIDEMENT OF DIABETIC WOUND WITH PLACEMENT OF STRAVIX GRAFT RIGHT FOOT performed by Juma Neal DPM at [address]  10/7/2021    BILATERAL LOWER EXTREMITY INCISION AND DRAINAGE, RIGHT FOOT 4TH RAY RESECTION, LEFT FOOT 5TH RAY RESECTION performed by Juma Neal DPM at [address] Bilateral 10/13/2021    REPEAT INCISION AND DRAINAGE OF ALL NONVIABLE SOFT TISSUE AND BONE/ PARTIAL CUBOID RESECTION/ BONY BIOPSY AS NEEDED/ WOUND VAC RIGHT LOWER EXTREMITY performed by Cr Matthews DPM at [address] IR TUNNELED CATHETER PLACEMENT GREATER THAN 5 YEARS  10/5/2021    IR TUNNELED CATHETER PLACEMENT GREATER THAN 5 YEARS 10/5/2021 [facility] SPECIAL PROCEDURES    KNEE SURGERY Left     from falling off ladder -- has screws in place pt report    OTHER SURGICAL HISTORY Left 05/25/2016    I & D left foot    OTHER SURGICAL HISTORY Right 10/20/2017    RIGHT GASTROC LENGTHENING ENDOSCOPIC, INJECTION OF AMNI GRAFT    OTHER SURGICAL HISTORY Right 04/26/2018    Diabetic foot ulcer I&D w/ integra graft application    IL DEBRIDEMENT, SKIN, SUB-Q TISSUE,MUSCLE,BONE,=<20 SQ CM Right 8/17/2018    RIGHT FOOT DEBRIDEMENT INCISION AND DRAINAGE, PARTIAL 5TH RAY AMPUTATION performed by Cr Matthews DPM at [address] PRE-MALIGNANT / [address]  7/7003    cryotherapy done on lesion    TOE AMPUTATION Left 02/24/2017    AMPUTATION LEFT GREAT TOE                 TONSILLECTOMY         Immunization History:   Immunization History   Administered Date(s) Administered    COVID-19, Dai Peter, PF, 30mcg/0.3mL 06/02/2021, 06/23/2021    Influenza 11/08/2011    Influenza Virus Vaccine 09/12/2014, 10/16/2015, 10/27/2017    Influenza, Quadv, 6 mo and older, IM, PF (Flulaval, Fluarix) 01/05/2019    Influenza, Quadv, IM, PF (6 mo and older Fluzone, Flulaval, Fluarix, and 3 yrs and older Afluria) 11/10/2016, 10/03/2019, 10/16/2021    Pneumococcal Polysaccharide ([id number]) 11/13/2015    Tdap (Boostrix, Adacel) 07/22/2014       Active Problems:  Patient Active Problem List   Diagnosis Code    Feet clawing Q66.89    Diabetic neuropathy ([facility] [address] E11.40    Hyperlipidemia E78.5    HTN (hypertension) I10    Amenorrhea N91.2    Dyspareunia [id number]    Neuropathy G62.9    Bacterial vaginosis N76.0, B96.89    Low back pain M54.50    Anomalies of nails Q84.6    Tobacco abuse Z72.0  Carpal tunnel syndrome G56.00    Scalp lesion L98.9    ETOH abuse F10.10    Pseudocyst, pancreas K86.3    Asthma J45.909    Nicotine addiction F17.200    Hypoxia R09.02    Onychomycosis B35.1    Depression F32. A    Anxiety state F41.1    Tinea pedis B35.3    Burn T30.0    Obesity (BMI 30-39. 9) E66.9    S/P foot surgery, right Z98.890    Open wound of left foot with complication E08.470Z    Cellulitis L03.90    Bilateral lower extremity edema R60.0    Chronic multifocal osteomyelitis of left foot ([facility]) A29.512    Acute systolic congestive heart failure ([facility]) I50.21    Acute osteomyelitis of metatarsal bone of right foot ([facility]) M86.171    Bronchitis J40    Cellulitis and abscess of foot L03.119, L02.619    Group B streptococcal infection A49.1    Tendonitis, Achilles, right M76.61    Diabetic ulcer of right midfoot associated with type 2 diabetes mellitus, limited to breakdown of skin ([facility]) E11.621, L97.411    Opiate dependence ([facility]) F11.20    Class 2 obesity due to excess calories with body mass index (BMI) of 37.0 to 37.9 in adult E66.09, Z68.37    CKD (chronic kidney disease), stage III ([facility]) N18.30    Diabetic foot infection ([facility]) E11.628, L08.9    Chronic osteomyelitis of right foot with draining sinus ([facility]) M86.471    Acute kidney injury superimposed on CKD ([facility]) N17.9, N18.9    Acute blood loss anemia D62    Aspiration pneumonitis ([facility]) J69.0    Altered mental status R41.82    Chronic systolic heart failure ([facility]) I50.22    Gastroesophageal reflux disease K21.9    Type 2 diabetes mellitus with right diabetic foot infection ([facility]) E11.628, B74.7    Systolic CHF, acute ([facility]) I50.21    Type 2 diabetes mellitus with left diabetic foot infection ([facility]) E11.628, L08.9    Lymphedema of left leg I89.0    Lymphedema of right lower extremity I89.0    Charcot's joint of ankle M14.679    Elevated sed rate R70.0    Elevated C-reactive protein (CRP) R79.82    History of transmetatarsal amputation of right foot ([facility] [address] Z89.431    History of alcoholism ([facility] [address] F10.21    Ex-smoker Z87.891    History of MRSA infection Z86.14    CKD (chronic kidney disease) stage 4, GFR 15-29 ml/min ([facility]) N18.4    Acute on chronic congestive heart failure (HCC) I50.9    Acute encephalopathy G93.40       Isolation/Infection:   Isolation            No Isolation          Patient Infection Status       Infection Onset Added Last Indicated Last Indicated By Review Planned Expiration Resolved Resolved By    None active    Resolved    COVID-19 Rule Out 10/18/21 10/18/21 10/18/21 COVID-19, Rapid (Ordered)   10/18/21 Rule-Out Test Resulted    MRSA  08/02/11 08/02/11 Aly Albarado RN   05/11/15 Hemant Chu RN    ca/colonization spt and nares            Nurse Assessment:  Last Vital Signs: /66   Pulse 76   Temp 97.8 °F (36.6 °C) (Axillary)   Resp 16   Ht 5' (1.524 m)   Wt 228 lb (103.4 kg)   LMP 10/23/2015   SpO2 90%   BMI 44.53 kg/m²     Last documented pain score (0-10 scale): Pain Level: 0  Last Weight:   Wt Readings from Last 1 Encounters:   10/18/21 228 lb (103.4 kg)     Mental Status:  oriented and alert    IV Access:  - Central Venous Catheter  - site  right, insertion date: 10/5/21    Nursing Mobility/ADLs:  Walking   Dependent  Transfer  Assisted  Bathing  Assisted  Dressing  Assisted  Toileting  Assisted  Feeding  Assisted  Med Admin  Independent  Med Delivery   whole    Wound Care Documentation and Therapy:  Negative Pressure Wound Therapy Foot Right (Active)   Wound Type Surgical 10/16/21 1553   Dressing Type Black foam 10/16/21 1553   Target Pressure (mmHg) 125 10/16/21 1553   Dressing Status Clean;Dry; Intact 10/16/21 1553   Dressing Changed Dressing reinforced 10/16/21 1553   Drainage Amount None 10/16/21 1553   Output (ml) 0 ml 10/16/21 1553   Number of days: 5       Wound 06/17/21 (Active)   Number of days: 123        Elimination:  Continence:   · Bowel:  Yes  · Bladder: Yes  Urinary Catheter: None   Colostomy/Ileostomy/Ileal Conduit: No       Date of Last BM: 10/20/21    Intake/Output Summary (Last 24 hours) at 10/19/2021 1254  Last data filed at 10/19/2021 0659  Gross per 24 hour   Intake 434.69 ml   Output    Net 434.69 ml     I/O last 3 completed shifts: In: 434.7 [I.V.:434.7]  Out: -     Safety Concerns: At Risk for Falls    Impairments/Disabilities:      Amputation - bilateral feet    Nutrition Therapy:  Current Nutrition Therapy:   - Oral Diet:  Carb Control 4 carbs/meal (1800kcals/day)    Routes of Feeding: Oral  Liquids: No Restrictions  Daily Fluid Restriction: no  Last Modified Barium Swallow with Video (Video Swallowing Test): not done    Treatments at the Time of Hospital Discharge:   Respiratory Treatments: oxygen  Oxygen Therapy:  is not on home oxygen therapy. Ventilator:    - No ventilator support     Heart Failure Instructions for Daily Management  Patient was treated for chronic diastolic heart failure. she  will require the following:     Please weigh daily on the same scale and approximately the same time of day. Report weight gain of 3 pounds/day or 5 pounds/week to : facility MD, Jo Mireles, [address] [place] [address] and [address] [place], [place] [address] [phone].  Please use hospital discharge weight as baseline reference.  Please monitor for signs and symptoms of and report to MD:  o Worsening Heart Failure: sudden weight gain, shortness of breath, lower extremity or general edema/swelling, abdominal bloating/swelling, inability to lie flat, intolerance to usual activity, or cough (especially at night). Report these finding even if no increase in weight.  o Dehydration:  having difficulty or a decrease in urination, dizziness, worsening fatigue, or new onset/worsening of generalized weakness.  Please continue a LOW SODIUM diet and LIMIT fluid intake to 48 - 64 ounces ( 1.5 - 2 liters) per day.       Call Jo Mireles [phone] and facility MD with any questions or concerns.  Please continue heart failure education to patient and family/support system.  See After Visit Summary for hospital follow up appointment details.  Consider spiritual care referral for support and/or completion of advance directives .  Consider: having the facility MD complete required 7 day follow up, [place] [address] telehealth program if patient agreeable and able to participate and palliative care consult for ongoing goals of care, end of life, and/or chronic disease management discussions. Rehab Therapies: Physical Therapy and Occupational Therapy  Weight Bearing Status/Restrictions: No weight bearing restirctions  Other Medical Equipment (for information only, NOT a DME order):  hospital bed  Other Treatments: n/a    Patient's personal belongings (please select all that are sent with patient):  None    RN SIGNATURE:  Electronically signed by Adelita Newman RN on 10/20/21 at 2:32 PM EDT    CASE MANAGEMENT/SOCIAL WORK SECTION    Inpatient Status Date: ***    Readmission Risk Assessment Score:  Readmission Risk              Risk of Unplanned Readmission:  36           Discharging to Facility/ [address] [place] [address]  [address] [address]       Phone: [phone]       Fax: [phone]        ·     / signature: Electronically signed by Tiffanie Gill RN on 10/20/21 at 1:17 PM EDT    PHYSICIAN SECTION    Prognosis: Fair    Condition at Discharge: Stable    Rehab Potential (if transferring to Rehab):  Fair    Recommended Labs or Other Treatments After Discharge:     Vancomycin 750 mg iv daily through 11/26  Meropenem 1 gm iv q 12 through 11/26  - Diagnosis - DM foot osteomyelitis   - PICC   - Disposition / date discharge  - Check CBC w diff, CMP, ESR, CRP, CK every Mon or Tue - FAX result to [phone]  - Call with antibiotic / infusion issues, [phone] - Call with any change in status, transfer in or out of a facility or to hospital - [phone]  - No f/u in outpatient ID office necessary    Podiatry Wound Care Discharge Instructions  Wound VAC dressing change instructions  -Please perform wound VAC dressing change every Monday, Wednesday, Friday to the RIGHT FOOT  -Please apply adaptic nonadherent dressing over the sutures of the incision distal to the wound of the right foot  -Using adhesive sheet from wound Vac kit, cut to size that will be placed over the wound and surrounding soft tissue to \"window out\" the wound  -Next, using a scissors cut the adhesive sheet out that is overlying the wound bed  -Next, apply small VAC WHITEFOAM to the wound to cover the exposed bone in the wound  -Next, using the black foam from the wound VAC kit, cut to size the black foam to fit over the wound bed  -Next, using the adhesive sheet, place the black foam over the wound bed and then place the adhesive sheet over the black foam to hold in place  -Next, using a scissors cut a quarter size hole in the adhesive sheet/black foam for the wound VAC suction connector to be placed over  -Next, place the wound VAC suction connector over the cut out area on the black foam  -Next, hook up the connector to the other portion of the wound VAC canister  -Set wound VAC to VAC therapy and running continuously for 125 mmHg   -Ensure that a good seal is noted.  If needed re-enforce areas with additional adhesive sheet stripes from the wound VAC kit  -Next, dress the area with gauze over the wound VAC area and place gauze along the top of the foot and ankle  -Next, using kerlix wrap from the toes up and just above the ankle  -Next, using Ace bandage, wrap from the toes up and just above the ankle with CARE to ensure wound VAC tubing is NOT in direct contact with the skin      Please perform every other day dressing changes to left lower extremity as follows  -Apply adaptic nonadherent dressing sutures

## 2021-10-19 NOTE — PROGRESS NOTES
Noted new order for Lasix 20mg x1. Dose was just given at 2216. Perfect Serve message sent to Dr. Neha Red. Stated to give additional 20mg IV Lasix tonight.

## 2021-10-19 NOTE — CARE COORDINATION
Case Management Assessment           Daily Note                 Date/ Time of Note: 10/19/2021 10:29 AM         Note completed by: JACQUIE Lynch    Patient Name: Yinka Saenz  YOB: 1965    Diagnosis:Encephalopathy [S48.46]  Systolic CHF, acute on chronic ([facility] [address] [I50.23]  Acute encephalopathy [G93.40]  Acute hypoxemic respiratory failure ([facility] [address] [J96.01]  Patient Admission Status: Inpatient    Date of Admission:10/18/2021 11:38 AM Length of Stay: 1 GLOS:      Current Plan of Care: currently on BR; SP for swallow eval; 6L NC;   ________________________________________________________________________________________  PT AM-PAC:   / 24 per last evaluation on: ordered but Pt on BR    OT AM-PAC:   / 24 per last evaluation on: ordered but Pt on BR    DME Needs for discharge:   ________________________________________________________________________________________  Discharge Plan: return to [facility]    Tentative discharge date: TBD    Current barriers to discharge: medical clearance    Referrals completed:     Resources/ information provided:   ________________________________________________________________________________________  Case Management Notes:  Pt was just DC from [facility] on 10/16[facility]21 to [facility]. DC plan is for Pt to return to SNF. EUSEBIA spoke with Clover/Fanny at [facility] ([phone]) who states that Pt's Henrik Jeffries is a Medicare product so she will have to submit for Precert for return and will submit now. EUSEBIA will follow. Merline Ferguson and her family were provided with choice of provider; she and her family are in agreement with the discharge plan.     Care Transition Patient: JACQUIE Brown  [facility]  Case Management Department  IW:[phone]

## 2021-10-19 NOTE — PROGRESS NOTES
Progress Note    Admit Date: 10/18/2021  Day: 2  Diet: Diet NPO    CC: AMS    Interval history: ***    HPI: Ginger Bolton is a 78-year-old female with past medical history of obesity, RASHMI, systolic CHF, bilateral lower foot wound s/p partial amputation, CKD, chronic pain on methadone, diabetes mellitus who presented with altered mental status. Patient was discharged 2 days back for recent admission for CHF exacerbation and was noted to have significant fluctuations in her mental status during the hospital stay also needing BiPAP due to hypercarbia and hypoxia. She was discharged to skilled nursing facility with continued antibiotics for osteomyelitis. Per EMS and the facility she was found to be minimally responsive shortly after receiving her typical 140 mg of methadone in the morning and therefore EMS was called. On arrival of the EMS she was noted to have glucose in the 30s and was given glucagon with improvement. She also received Narcan 2 mg with improvement in her mental status. On arrival to the ED, she was also noted to have hypoxia and was placed on the nonrebreather oxygen and was noted to be very agitated, shouting and being violent with the staff s/p given Haldol and Versed. She was later noted to have glucose levels in the 30s and was given D50 followed by started on D5 LR at 150 mL/h. Blood cultures were sent and her antibiotic for osteomyelitis were continued. EKG showed sinus bradycardia and chest x-ray showed bilateral airspace disease which was new than the previous study. Yeah decision was made to admit her to the floor for further evaluation of her altered mental status. Patient was admitted few weeks back with complaints of leg swelling and pain and was therefore treated with antibiotics for osteomyelitis and podiatry was consulted s/p I&D with left fifth resection, right fourth ray resection, right partial cuboid resection.     Medications:     Scheduled Meds:   amitriptyline  100 mg Oral Nightly    aspirin EC  81 mg Oral Daily    atorvastatin  20 mg Oral Nightly    ammonium lactate   Topical Daily    apixaban  5 mg Oral BID    escitalopram  10 mg Oral Daily    gabapentin  400 mg Oral BID    hydrALAZINE  50 mg Oral 3 times per day    pantoprazole  40 mg Oral QAM AC    sodium chloride flush  5-40 mL IntraVENous 2 times per day    vancomycin  750 mg IntraVENous Q24H    meropenem  1,000 mg IntraVENous Q12H     Continuous Infusions:   sodium chloride      dextrose       PRN Meds:hydrOXYzine, sodium chloride flush, sodium chloride, ondansetron **OR** ondansetron, polyethylene glycol, acetaminophen **OR** acetaminophen, glucose, dextrose, glucagon (rDNA), dextrose, naloxone    Objective:   Vitals:   T-max:  Patient Vitals for the past 8 hrs:   SpO2   10/19/21 0000 94 %     No intake or output data in the 24 hours ending 10/19/21 0630    Review of Systems    Physical Exam    LABS:    CBC:   Recent Labs     10/18/21  1156   WBC 12.9*   HGB 9.3*   HCT 28.2*      MCV 85.9     Renal:    Recent Labs     10/18/21  1156 10/18/21  2010     --    K 4.6  --      --    CO2 29  --    BUN 28*  --    CREATININE 1.4*  --    GLUCOSE 78  --    CALCIUM 9.3  --    MG  --  1.80   ANIONGAP 8  --      Hepatic:   Recent Labs     10/18/21  1156   AST 20   ALT 16   BILITOT <0.2   PROT 6.7   LABALBU 2.7*   ALKPHOS 149*     Troponin:   Recent Labs     10/18/21  1156 10/18/21  2010 10/19/21  0152   TROPONINI 0.03* 0.02* 0.03*     BNP: No results for input(s): BNP in the last 72 hours. Lipids: No results for input(s): CHOL, HDL in the last 72 hours.     Invalid input(s): LDLCALCU, TRIGLYCERIDE  ABGs:    Recent Labs     10/18/21  1756   PHART 7.356   LBA2GXZ 57.4*   PO2ART 53.9*   SCJ6QSE 32*   BEART 5.7*   L0LEIULP 87*   LVY8EVZ 34       INR:   Recent Labs     10/18/21  1156   INR 1.35*     Lactate: No results for input(s): LACTATE in the last 72 hours.  Cultures:  -----------------------------------------------------------------  RAD:   XR CHEST PORTABLE   Final Result      1. Moderately severe diffuse bilateral airspace disease, new/increased from prior study. 2. Cardiomegaly. CT Head WO Contrast   Final Result      1. No acute intracranial hemorrhage or mass effect. Assessment/Plan:     Altered mental status likely 2/2 Hypoglycemia vs methadone use  Patient had glucose in the 30s on arrival, patient is on 50 units of Lantus and 8 units with meals. During recent hospitalization patient was on 12 units Lantus and high-dose sliding scale. In the ED patient received D50 along with D5 LR @100 mL/h  -ordered [email or web address] mL/h  -Hypoglycemia protocol  -POC checks every 2 hours  -Hold home insulin for now  -Ordered ammonia levels  -N.p.o. for now  -OT PT evaluation  -SLP evaluation  -Held methadone for now  -Ordered urine drug screen  -Ordered naloxone 0.4 mg IV as needed  -Neurochecks every 4 hours     History of osteomyelitis s/p I&D and partial amputation  -Continue vancomycin and Merrem     Hypoxia with new oxygen requirement  C Xray shows B/L basilar opacities  -afebile with elevated leukocyte count  -COVID test pending   -pending blood cultures     Type 2 diabetes mellitus  Patient noted to have low blood glucose levels on presentation  -Held home medications for now  -POC checks every 2 hours  -Hypoglycemia protocol  -Continue Convertio Co mL/h     CKD  Baseline creatinine is around 2  -Avoid nephrotoxic drugs  -Monitor RFP daily  -Monitor electrolyte levels     Acute on Chronic diastolic heart failure  Last echo 2018 with EF 50 to 55%. Evidence of PAH which may be contributing.  Chest x-ray shows mild cardiomegaly with bilateral diffuse opacities, elevated pro BNP  -Strict I's and O's, daily weights  -S/p Lasix 40 mg IV in the ED  -Lasix 20 mg IV twice daily     Leukocytosis   -Ordered lactic acid levels  -Ordered blood cultures     MDD  -Continue home amitriptyline 100 mg daily  -Continue escitalopram 10 mg oral daily     Hypertension  -Continue home hydralazine, held metoprolol for now due to bradycardia      History of opioid pain medication use, now on methadone  Patient seen at [facility] methadone [facility] and is prescribed methadone 140 mg. Patient reports she has been going to methadone clinic for about 20 years  -Held methadone for now  -Narcan as needed    Code Status: Full Code  FEN: Diet NPO  PPX: Eliquis  DISPO: SHEMAR Emanuel DO, PGY-1  10/19/21  6:30 AM    This patient has been staffed and discussed with Dr. Dany Russell.

## 2021-10-19 NOTE — PROGRESS NOTES
Progress Note    Admit Date: 10/18/2021  Day: 2  Diet: Adult Oral Nutrition Supplement; Wound Healing Oral Supplement  ADULT DIET; Regular; 3 carb choices (45 gm/meal); Low Sodium (2 gm)    CC: AMS    Interval history:  Patient AOX4 this a.m. Continues to be drowsy however much less compared to initial admisison. She complains of back pain, states it is related to laying in bed. Patient states after Narcan she is having body pain and generalized discomfort, feelings of dissociation - now resolved. Patient denies cough, cold symptoms, SOB. Patient denies fever/chills, nausea/vomiting, diarrhea/constipation, abdominal pain. HPI: Ann Nash is a 68-year-old female with PMH CKD 4, DVT (2004; now on Eliquis), T2DM, narcotic-dependent chronic pain (now on Methadone), systolic CHF, suspected RASHMI, bilateral lower foot wound s/p partial amputation who presented with altered mental status. Patient was d/c 10/16 (2 days prior to re-admission) during which she was diagnosed with osteomyelitis of feet and given wound care. Wound cultures had grown Pseudomonas, E coli and Enterococcus and patient was treated with Vanc, Merrem and Fluconazole. She also had an FAHEEM on CKD. She recevied multiple I&D with surgery and was hypotensive, difficult to arouse after these procedures. VBG showed acute on chronic respiratory acidoseis and she was placed on BiPAP with improvement. She also had hyperkalemia 2/2 type 4 RTA from diabetes, started on [facility] and managed appropriately. She was discharged to skilled nursing facility with continued antibiotics for osteomyelitis. Per EMS and the facility she was found to be minimally responsive shortly after receiving her typical 140 mg of methadone in the morning and therefore EMS was called. On arrival of the EMS she was noted to have glucose in the 30s and was given glucagon with improvement. She also received Narcan 2 mg with improvement in her mental status.   On arrival to the ED, she was also noted to have hypoxia and was placed on the nonrebreather oxygen and was noted to be very agitated, shouting and being violent with the staff, given Haldol and Versed. She was later noted to have glucose levels in the 30s and was given D50 followed by started on D5 LR at 150 mL/h. Blood cultures were sent and her antibiotic for osteomyelitis were continued. EKG showed sinus bradycardia and chest x-ray showed bilateral airspace disease which was new compared to previous study. The decision was made to admit her to the floor for further evaluation of her altered mental status. Medications:     Scheduled Meds:   amitriptyline  100 mg Oral Nightly    aspirin EC  81 mg Oral Daily    atorvastatin  20 mg Oral Nightly    ammonium lactate   Topical Daily    apixaban  5 mg Oral BID    escitalopram  10 mg Oral Daily    gabapentin  400 mg Oral BID    hydrALAZINE  50 mg Oral 3 times per day    pantoprazole  40 mg Oral QAM AC    sodium chloride flush  5-40 mL IntraVENous 2 times per day    vancomycin  750 mg IntraVENous Q24H    meropenem  1,000 mg IntraVENous Q12H     Continuous Infusions:   sodium chloride      dextrose       PRN Meds:hydrOXYzine, sodium chloride flush, sodium chloride, ondansetron **OR** ondansetron, polyethylene glycol, acetaminophen **OR** acetaminophen, glucose, dextrose, glucagon (rDNA), dextrose, naloxone    Objective:   Vitals:   T-max:  Patient Vitals for the past 8 hrs:   BP Temp Temp src Pulse Resp SpO2   10/19/21 1503 (!) 168/61 97.4 °F (36.3 °C) Axillary 84 16 95 %   10/19/21 1139 139/66 97.8 °F (36.6 °C) Axillary 76 16 90 %   10/19/21 0834      93 %       Intake/Output Summary (Last 24 hours) at 10/19/2021 1556  Last data filed at 10/19/2021 1402  Gross per 24 hour   Intake 734.69 ml   Output 2000 ml   Net -1265.31 ml       Review of Systems  Per Interval Hx. Physical Exam  Constitutional:       Appearance: She is obese.    HENT:      Head: Normocephalic and atraumatic. Nose: Nose normal.      Mouth/Throat:      Mouth: Mucous membranes are moist.      Pharynx: Oropharynx is clear. Eyes:      Extraocular Movements: Extraocular movements intact. Conjunctiva/sclera: Conjunctivae normal.   Cardiovascular:      Rate and Rhythm: Normal rate and regular rhythm. Pulses: Normal pulses. Heart sounds: Normal heart sounds. Pulmonary:      Effort: Pulmonary effort is normal.      Breath sounds: Normal breath sounds. Abdominal:      General: Bowel sounds are normal.      Palpations: Abdomen is soft. Comments: Obese abdomen. Genitourinary:     Comments: Deferred. Musculoskeletal:      Cervical back: Normal range of motion and neck supple. Skin:     General: Skin is warm and dry. Capillary Refill: Capillary refill takes less than 2 seconds. Comments: IV line noted at R chest.   Neurological:      General: No focal deficit present. Mental Status: She is alert and oriented to person, place, and time. Mental status is at baseline. LABS:    CBC:   Recent Labs     10/18/21  1156 10/19/21  0630   WBC 12.9* 9.2   HGB 9.3* 8.1*   HCT 28.2* 24.6*    327   MCV 85.9 86.9     Renal:    Recent Labs     10/18/21  1156 10/18/21  2010 10/19/21  0630     --  140   K 4.6  --  4.5     --  103   CO2 29  --  30   BUN 28*  --  30*   CREATININE 1.4*  --  1.6*   GLUCOSE 78  --  95   CALCIUM 9.3  --  8.8   MG  --  1.80  --    ANIONGAP 8  --  7     Hepatic:   Recent Labs     10/18/21  1156 10/19/21  0630   AST 20 21   ALT 16 18   BILITOT <0.2 <0.2   PROT 6.7 5.9*   LABALBU 2.7* 2.3*   ALKPHOS 149* 151*     Troponin:   Recent Labs     10/18/21  1156 10/18/21  2010 10/19/21  0152   TROPONINI 0.03* 0.02* 0.03*     BNP: No results for input(s): BNP in the last 72 hours. Lipids: No results for input(s): CHOL, HDL in the last 72 hours.     Invalid input(s): LDLCALCU, TRIGLYCERIDE  ABGs:    Recent Labs     10/18/21  1756   PHART 7.356 ZKA4USA 57.4*   PO2ART 53.9*   MVL7LAU 32*   BEART 5.7*   L0XTZULQ 87*   CLZ0UDY 34       INR:   Recent Labs     10/18/21  1156   INR 1.35*     Lactate: No results for input(s): LACTATE in the last 72 hours. Cultures:  -----------------------------------------------------------------  RAD:   XR CHEST PORTABLE   Final Result      1. Moderately severe diffuse bilateral airspace disease, new/increased from prior study. 2. Cardiomegaly. CT Head WO Contrast   Final Result      1. No acute intracranial hemorrhage or mass effect. Assessment/Plan:   Anabella Sears is a 49-year-old female with PMH CKD 4, DVT (2004; now on Eliquis), T2DM, narcotic-dependent chronic pain (now on Methadone), systolic CHF, suspected RASHMI, bilateral lower foot wound s/p partial amputation who presented with altered mental status. She was found to have BG low to 30's on arrival, treated with Dextrose drip. AMS, Multifactorial - Improving   Likely related to hypoglycemia combined with possible Methadone overuse. BG low to 30's on arrival. In the ED patient received D50 along with D5 LR @100 mL/h. Patient on 50U Lantus and 8U with meals, during prior hospitalization she was on 12U Lantus and HDSS. Glucose 90's this a.m.  - continue to monitor POCT glucose, hypoglycemia protocol   - holding home insulin at this time  - f/u UDS   - ordered naloxone 0.4 mg IV PRN  - neuro checks q4h     Ulcerations of b/l Feet   Osteomyelitis on Recent Admission  .1, . S/p I&D with partial cuboid resection of R foot, I&D with [facility] of  foot (10/13). - Podiatry c/s - appreciate reccs  - per Podiatry, wound vac application 50/45; no surg intervention at this itme  - continue Vancomycin and Merrem through 11/26      Acute Hypoxic Respiratory Failure - Resolved   New oxygen requirement of 5L NC in ED. CXR with evidence of basilar opacities, slightly worse from previous CXR. Afebile with elevated leukocyte count.   - weaned off oxygen   - COVID negative  - f/u blood cultures     Acute on Chronic Diastolic Heart Failure  Last echo 2018 with EF 50 to 55%. Evidence of PAH which may be contributing. Chest x-ray shows mild cardiomegaly with bilateral diffuse opacities, elevated pro BNP.  - Lasix 40 mg IV    T2DM  BG low to 30's on arrival. S/p Dextrose gtt. - hold Lantus on d/c  - continue Lispro 8U TID on d/c   - continue to monitor POCT glucose, hypoglycemia protocol      CKD  Baseline Cr 2.   - continue to monitor   - avoid nephrotoxic drugs      MDD  - continue home meds     Hypertension  - continue home hydralazine, held metoprolol for now due to bradycardia       Hx Opioid Pain Med Use, Now on Methadone   Patient seen at outpatient methadone clinic and is prescribed methadone 140 mg. Patient reports she has been going to methadone clinic for about 20 years. - holding Methadone in light of AMS  - Narcan as needed    Code Status: Full Code  FEN: Adult Oral Nutrition Supplement; Wound Healing Oral Supplement  ADULT DIET; Regular; 3 carb choices (45 gm/meal); Low Sodium (2 gm)  PPX: Eliquis  DISPO: SHEMAR Calle MD, PGY-1  10/19/21  3:56 PM    This patient has been staffed and discussed with Dr. Hector Castañeda. Patient seen and examined, labs and imaging studies reviewed, agree with assessment and plan as outlined above. Continue with current care and plan. Discussed case with patients nurse, discussed case with care team, discussed plan. Discussed case with team, discussed plan, greater than 35 minutes spent on case over half face to face.      MD Coni Lemons

## 2021-10-19 NOTE — CONSULTS
Department of Podiatry Consult Note  Resident       Reason for Consult:  Multiple wounds b/l LE  Requesting Physician:  Markia Coker MD    CHIEF COMPLAINT:  B/l LE wounds    HISTORY OF PRESENT ILLNESS:                The patient is a 60 y.o. female with significant past medical history as listed below. Podiatry was consulted for bilateral lower extremity wounds. Patient is well-known to podiatry service. Patient was recently admitted at [facility] from 10/1/21 - 10/16/21. Patient underwent two surgical interventions while admitted, s/p b/l I&D, right 4th ray resection, right partial cuboid resection, left 5th ray resection (DOS 10/7/21), s/p left foot I&D with [facility], right foot I&D, partial cuboid resection and wound vac application (63/17/40). Patient with known history of osteomyelitis to the b/l lower extremities. She was previously discharged with recommendations of prolonged IV antibiotics (vancomycin and meropenem through 11/26/21) by Dr. Bill Newsome. Patient presented emergency department yesterday from her facility after reportedly being unresponsive at facility. She was responsive upon presentation to ED after glucagon and Narcan was given in route to hospital.  Patient was admitted for altered mental status. Patient states she is feeling better today, states she never feels great after being given Narcan. She denies any pain to her bilateral lower extremities at this time. Patient reports the dressings to her bilateral lower extremities have not been changed since discharge from the hospital.  She denies any other pedal complaints this time.     Past Medical History:        Diagnosis Date    Asthma 05/14/2004    Bacterial vaginosis 04/2008    Carpal tunnel syndrome 05/2007    COPD (chronic obstructive pulmonary disease) ([address]     Diabetes mellitus type II 08/2007    10/1/20 pt states does accucheck 2x/day at home    Diabetic neuropathy ([address] 34/6963    Diastolic CHF ([address]     DVT [facility] venous thrombosis) [facility] 2004    Dyslipidemia 2009    Dyspareunia 2009    ETOH abuse 2007    Feet clawing     HTN (hypertension)     Hx of blood clots     Hyperlipidemia     MRSA (methicillin resistant staph aureus) culture positive 2017; 2017    foot; leg     Neuropathy 2009    polyneuropathy    Pancreatitis 2004    Scalp lesion 2007    Tobacco abuse 2008    Uses walker     Uses wheelchair     also uses walker    Wears dentures      Past Surgical History:        Procedure Laterality Date     SECTION  unknown    FOOT DEBRIDEMENT Right 2019    INCISION AND DRAINAGE WITH APPLICATION OF STRAVIX GRAFT RIGHT FOOT performed by Sarkis Phillips DPM at [address] Right 2019    RIGHT FOOT INCISION AND DRAINAGE WITH STAGING TRANSMETATARSAL AMPUTATION performed by Sarkis Phillips DPM at [address] Right 2019    RIGHT FOOT DEBRIDEMENT INCISION AND DRAINAGE, OPEN DIABETIC FOOT ULCER WITH GRAFT PLACEMENT performed by Sarkis Phillips DPM at [address] Left 10/23/2019    LEFT FOOT INCISION AND DRAINAGE , DEBRIDEMENT OF OPEN WOUND, APPLICATION OF STRAVIX GRAFT performed by Sarkis Phillips DPM at [address] Right 3/29/2021    INCISION AND DRAINAGE, DEBRIDEMENT OF DIABETIC WOUND WITH PLACEMENT OF STRAVIX GRAFT RIGHT FOOT performed by Sarkis Phillips DPM at [address]  10/7/2021    BILATERAL LOWER EXTREMITY INCISION AND DRAINAGE, RIGHT FOOT 4TH RAY RESECTION, LEFT FOOT 5TH RAY RESECTION performed by Sarkis Phillips DPM at [address] Bilateral 10/13/2021    REPEAT INCISION AND DRAINAGE OF ALL NONVIABLE SOFT TISSUE AND BONE/ PARTIAL CUBOID RESECTION/ BONY BIOPSY AS NEEDED/ WOUND VAC RIGHT LOWER EXTREMITY performed by Sarkis Phillips DPM at [address] IR TUNNELED CATHETER PLACEMENT GREATER THAN 5 YEARS  10/5/2021    IR TUNNELED CATHETER PLACEMENT GREATER THAN 5 YEARS 10/5/2021 [facility] SPECIAL PROCEDURES    KNEE SURGERY Left     from falling off ladder -- has screws in place pt report    OTHER SURGICAL HISTORY Left 05/25/2016    I & D left foot    OTHER SURGICAL HISTORY Right 10/20/2017    RIGHT GASTROC LENGTHENING ENDOSCOPIC, INJECTION OF AMNI GRAFT    OTHER SURGICAL HISTORY Right 04/26/2018    Diabetic foot ulcer I&D w/ integra graft application    AR DEBRIDEMENT, SKIN, SUB-Q TISSUE,MUSCLE,BONE,=<20 SQ CM Right 8/17/2018    RIGHT FOOT DEBRIDEMENT INCISION AND DRAINAGE, PARTIAL 5TH RAY AMPUTATION performed by Yayo Koroma DPM at [address] [place] / [address]  7/7003    cryotherapy done on lesion    TOE AMPUTATION Left 02/24/2017    AMPUTATION LEFT GREAT TOE                 TONSILLECTOMY       Current Medications:    Current Facility-Administered Medications: amitriptyline (ELAVIL) tablet 100 mg, 100 mg, Oral, Nightly  aspirin EC tablet 81 mg, 81 mg, Oral, Daily  atorvastatin (LIPITOR) tablet 20 mg, 20 mg, Oral, Nightly  ammonium lactate (LAC-HYDRIN) 12 % lotion, , Topical, Daily  apixaban (ELIQUIS) tablet 5 mg, 5 mg, Oral, BID  escitalopram (LEXAPRO) tablet 10 mg, 10 mg, Oral, Daily  gabapentin (NEURONTIN) capsule 400 mg, 400 mg, Oral, BID  hydrALAZINE (APRESOLINE) tablet 50 mg, 50 mg, Oral, 3 times per day  pantoprazole (PROTONIX) tablet 40 mg, 40 mg, Oral, QAM AC  hydrOXYzine (VISTARIL) capsule 50 mg, 50 mg, Oral, Q8H PRN  sodium chloride flush 0.9 % injection 5-40 mL, 5-40 mL, IntraVENous, 2 times per day  sodium chloride flush 0.9 % injection 5-40 mL, 5-40 mL, IntraVENous, PRN  0.9 % sodium chloride infusion, 25 mL, IntraVENous, PRN  ondansetron (ZOFRAN-ODT) disintegrating tablet 4 mg, 4 mg, Oral, Q8H PRN **OR** ondansetron (ZOFRAN) injection 4 mg, 4 mg, IntraVENous, Q6H PRN  polyethylene glycol (GLYCOLAX) packet 17 g, 17 g, Oral, Daily PRN  acetaminophen (TYLENOL) tablet 650 mg, 650 mg, Oral, Q6H PRN **OR** acetaminophen (TYLENOL) suppository 650 mg, 650 mg, Rectal, Q6H PRN  glucose (GLUTOSE) 40 % oral gel 15 g, 15 g, Oral, PRN  dextrose 50 % IV solution, 12.5 g, IntraVENous, PRN  glucagon (rDNA) injection 1 mg, 1 mg, IntraMUSCular, PRN  dextrose 5 % solution, 100 mL/hr, IntraVENous, PRN  naloxone (NARCAN) injection 0.4 mg, 0.4 mg, IntraVENous, PRN  vancomycin (VANCOCIN) 750 mg in dextrose 5 % 250 mL IVPB, 750 mg, IntraVENous, Q24H  meropenem (MERREM) 1,000 mg in sodium chloride 0.9 % 100 mL IVPB (mini-bag), 1,000 mg, IntraVENous, Q12H  Allergies:   Sulfa antibiotics  Social History:    TOBACCO:   reports that she quit smoking about 3 years ago. Her smoking use included cigarettes. She has a 30.00 pack-year smoking history. She has never used smokeless tobacco.  ETOH:   reports no history of alcohol use. DRUGS:   reports no history of drug use. Family History:   History reviewed. No pertinent family history. REVIEW OF SYSTEMS:    A 12 point review of systems is unremarkable with the exception of the chief complaint. Patient specifically denies nausea, vomiting, fever, chills, shortness of breath, chest pain, abdominal pain, constipation, or difficulty urinating. PHYSICAL EXAM:      Vitals:    /61   Pulse 66   Temp 97.7 °F (36.5 °C) (Axillary)   Resp 13   Ht 5' (1.524 m)   Wt 228 lb (103.4 kg)   LMP 10/23/2015   SpO2 93%   BMI 44.53 kg/m²     LABS:   Recent Labs     10/18/21  1156 10/19/21  0630   WBC 12.9* 9.2   HGB 9.3* 8.1*   HCT 28.2* 24.6*    327     Recent Labs     10/19/21  0630      K 4.5      CO2 30   BUN 30*   CREATININE 1.6*     Recent Labs     10/18/21  1156 10/19/21  0630   PROT 6.7 5.9*   INR 1.35*  --          VASCULAR: DP and PT pulses nonpalpable 0/4 b/l. Upon hand-held doppler examination, DP and PT pulses noted to have weakly biphasic signals b/l. Trula Blew CFT is brisk to the distal aspect of the foot b/l.  Skin temperature is warm to cool from proximal to distal b/l. +1 pitting edema noted to b/l LE. No pain with calf compression b/l. NEUROLOGIC: Gross and epicritic sensation is diminished b/l. Protective sensation is diminished at all pedal sites b/l. DERMATOLOGIC: Chronic dermatological changes noted to b/l LE. Right Lower extremity:    Full-thickness ulceration noted to the plantar lateral aspect of the right foot measuring approximately 4.0 cm x 5.0 cm x 4.0 cm. Wound base is a combination of granular and fibrotic tissue. Wound probes to bone, does not tunnel or track. No fluctuance or crepitus noted  No periwound erythema noted. No malodor noted. Surgical incision noted at the lateral aspect of the right foot extending proximally to the wound bed. Skin edges are well coapted with surgical sutures intact. No signs of dehiscence noted. No acute signs infection. Left lower extremity:    Surgical incision noted to the lateral aspect left foot measuring approximately 8 cm. Mild dehiscence noted to the central aspect of the incision with macerated edges and granular base. Distal and proximal aspect of incision skin edges remain well coapted with surgical sutures intact. No fluctuance or crepitus noted. No purulent drainage expressed. No acute signs of infection noted. Full thickness ulceration noted to the plantar aspect of the left foot measuring approximately 2.4 cm x 1.0 cm x 0.1 cm. Wound base is necrotic eschar and fibrotic tissue. Wound does not probe to bone, tunnel, or track. No fluctuance or crepitus noted. No malodor noted. No periwound erythema noted. MUSCULOSKELETAL: Muscle strength is 4/5 for all pedal groups tested. No pain with palpation of the foot or ankle b/l. Ankle joint ROM is decreased in dorsiflexion with the knee extended.  History of TMA right foot, fourth ray and partial cuboid resection right foot. History of hallux amputation left foot, fifth ray resection left foot.      IMAGING:  XR LEFT FOOT (10/7)  Narrative   EXAM: XR FOOT LEFT (2 VIEWS)       INDICATION:  s/p 5th ray resection & cuboid bone biopsy; packed open       COMPARISON: 10/3/2021, 10/2/2021       FINDINGS:       There are postsurgical changes of fifth ray resection. There is a small amount of postsurgical subcutaneous gas. Redemonstration of prior amputation of the first digit at the level of the base of the first proximal phalanx again seen.       Impression   Expected postsurgical changes of left fifth ray resection. XR RIGHT FOOT (10/13)  Narrative   History: Status post partial cuboid amputation. Right foot infection.       3 views right foot.       FINDINGS: There is a large defect along bilateral foot with wound VAC device. Extensive soft tissue swelling. Resection of much of the cuboid. No acute complication identified.       Impression   1. Partial amputation the region of the cuboid with soft tissue defect and extensive soft tissue swelling. No acute complication identified. IMPRESSION/RECOMMENDATIONS:    -s/p I&D, partial cuboid resection of right foot, I&D with [facility] of left foot (10/13/21)  -Full thickness ulceration; left foot; Narcisa Craig 1  -PVD; b/l LE  -Edema; b/l LE  -Type 2 diabetes mellitus with peripheral neuropathy  -History of osteomyelitis; b/l LE  -History of noncompliance with weight bearing status    -Patient examined and evaluated at bedside   -VSS, no leukocytosis noted (WBC 9.2)  -.1, [id number]  -Prealbumin (10/2/21)- 7.2, ordered dietary nutritional supplements to optimize wound healing potential  -HbA1c (10/1/21)- 8.3%  -Wound culture not obtained 2/2 no acute signs of infection  -Blood culture pending  -Imaging reviewed, impression noted above  -Using a #15 blade, I excisionally debrided the nonviable tissue down to and including subcutaneous tissue of the left foot. Less than 5cc of bleeding noted. Hemostasis achieved with direct pressure. Patient tolerated well.   -Dressing applied to right LE consisting of adaptic to incision, saline moistened guaze to wound bed, dry sterile gauze, ABD, kerlix, ACE  -Will plan for wound VAC application tomorrow.   -Dressing applied to left LE consisting of betadine, adaptic to surgical incision, gauze, ABD, kerlix, ACE  -Continue IV antibiotics per the prior recommendations of Dr. Adin Joy (vancomycin and meropenem through 11/26)  -Nonweight bearing to b/l LE  -Patient is OK to discharge from podiatric standpoint pending medical clearance. Patient will need wound VAC arranged at facility, will discuss with social work. Dispo: s/p I&D with partial cuboid resection of right foot, I&D and DPC to left foot. Full thickness ulceration; left foot. Nonweight bearing to the b/l LE. Continue IV antibiotics per prior ID recommendations. Will plan for wound VAC application tomorrow. No surgical intervention from podiatric standpoint at this time. Will continue to follow while patient is in house.      - The patient will be staffed with RAMONE Frederick DPM  Podiatric Resident, PGY-2  Pager #: [phone] or Yissel Serve

## 2021-10-19 NOTE — CONSULTS
Nephrology Consult Note                                                                                                                                                                                                                                                                                                                                                               Office : [phone]     Fax :[phone]              Patient's Name: Buck Llanes    10/18/2021    Reason for Consult:  Vol management   Requesting Physician:  Tyson Cottrell MD      Chief Complaint:  AMS     History of Present Ilness:    Rojas Robledo is a 55-year-old female with past medical history of obesity, RASHMI, systolic CHF, bilateral lower foot wound s/p partial amputation, CKD, chronic pain on methadone, diabetes mellitus who presented with altered mental status. Patient was discharged 2 days back for recent admission for CHF exacerbation and was noted to have significant fluctuations in her mental status during the hospital stay also needing BiPAP due to hypercarbia and hypoxia.  Pt is altered   Aspirating     Past Medical History:   Diagnosis Date    Asthma 05/14/2004    Bacterial vaginosis 04/2008    Carpal tunnel syndrome 05/2007    COPD (chronic obstructive pulmonary disease) ([address]     Diabetes mellitus type II 08/2007    10/1/20 pt states does accucheck 2x/day at home    Diabetic neuropathy ([address] 82/1032    Diastolic CHF ([address]     DVT (deep venous thrombosis) ([address] 03/2004    Dyslipidemia 05/2009    Dyspareunia 05/2009    ETOH abuse 03/04/2007    Feet clawing     HTN (hypertension)     Hx of blood clots     Hyperlipidemia     MRSA (methicillin resistant staph aureus) culture positive 11/06/2017; 11/17/2017    foot; leg     Neuropathy 05/2009    polyneuropathy    Pancreatitis 05/14/2004    Scalp lesion 08/2007    Tobacco abuse 03/2008    Uses walker     Uses wheelchair     also uses walker    Wears dentures        Past Surgical History:   Procedure Laterality Date     SECTION  unknown    FOOT DEBRIDEMENT Right 2019    INCISION AND DRAINAGE WITH APPLICATION OF STRAVIX GRAFT RIGHT FOOT performed by Norberto Severs, DPM at [address] Right 2019    RIGHT FOOT INCISION AND DRAINAGE WITH STAGING TRANSMETATARSAL AMPUTATION performed by Norberto Severs, DPM at [address] Right 2019    RIGHT FOOT DEBRIDEMENT INCISION AND DRAINAGE, OPEN DIABETIC FOOT ULCER WITH GRAFT PLACEMENT performed by Norberto Severs, DPM at [address] Left 10/23/2019    LEFT FOOT INCISION AND DRAINAGE , DEBRIDEMENT OF OPEN WOUND, APPLICATION OF STRAVIX GRAFT performed by Norberto Severs, DPM at [address] Right 3/29/2021    INCISION AND DRAINAGE, DEBRIDEMENT OF DIABETIC WOUND WITH PLACEMENT OF STRAVIX GRAFT RIGHT FOOT performed by Norberto Severs, DPM at [address]  10/7/2021    BILATERAL LOWER EXTREMITY INCISION AND DRAINAGE, RIGHT FOOT 4TH RAY RESECTION, LEFT FOOT 5TH RAY RESECTION performed by Norberto Severs, DPM at [address] Bilateral 10/13/2021    REPEAT INCISION AND DRAINAGE OF ALL NONVIABLE SOFT TISSUE AND BONE/ PARTIAL CUBOID RESECTION/ BONY BIOPSY AS NEEDED/ WOUND VAC RIGHT LOWER EXTREMITY performed by Norberto Severs, DPM at [address] IR TUNNELED [address] 5 YEARS  10/5/2021    IR TUNNELED CATHETER PLACEMENT GREATER THAN 5 YEARS 10/5/2021 [facility] SPECIAL PROCEDURES    KNEE SURGERY Left     from falling off ladder -- has screws in place pt report    OTHER SURGICAL HISTORY Left 2016    I & D left foot    OTHER SURGICAL HISTORY Right 10/20/2017    RIGHT GASTROC LENGTHENING ENDOSCOPIC, INJECTION OF AMNI GRAFT    OTHER SURGICAL HISTORY Right 2018    Diabetic foot ulcer I&D w/ integra graft application    WY DEBRIDEMENT, SKIN, SUB-Q TISSUE,MUSCLE,BONE,=<20 SQ CM Right 2018 RIGHT FOOT DEBRIDEMENT INCISION AND DRAINAGE, PARTIAL 5TH RAY AMPUTATION performed by Ganga Amanda DPM at [address] PRE-[place] / [address]  7/7003    cryotherapy done on lesion    TOE AMPUTATION Left 02/24/2017    AMPUTATION LEFT GREAT TOE                 TONSILLECTOMY         No family history on file. reports that she quit smoking about 3 years ago. Her smoking use included cigarettes. She has a 30.00 pack-year smoking history. She has never used smokeless tobacco. She reports that she does not drink alcohol and does not use drugs.     Allergies:  Sulfa antibiotics    Current Medications:    amitriptyline (ELAVIL) tablet 100 mg, Nightly  [START ON 10/19/2021] aspirin EC tablet 81 mg, Daily  atorvastatin (LIPITOR) tablet 20 mg, Nightly  [START ON 10/19/2021] ammonium lactate (LAC-HYDRIN) 12 % lotion, Daily  apixaban (ELIQUIS) tablet 5 mg, BID  [START ON 10/19/2021] escitalopram (LEXAPRO) tablet 10 mg, Daily  furosemide (LASIX) injection 20 mg, BID  gabapentin (NEURONTIN) capsule 400 mg, BID  hydrALAZINE (APRESOLINE) tablet 50 mg, 3 times per day  [START ON 10/19/2021] pantoprazole (PROTONIX) tablet 40 mg, QAM AC  hydrOXYzine (VISTARIL) capsule 50 mg, Q8H PRN  sodium chloride flush 0.9 % injection 5-40 mL, 2 times per day  sodium chloride flush 0.9 % injection 5-40 mL, PRN  0.9 % sodium chloride infusion, PRN  ondansetron (ZOFRAN-ODT) disintegrating tablet 4 mg, Q8H PRN   Or  ondansetron (ZOFRAN) injection 4 mg, Q6H PRN  polyethylene glycol (GLYCOLAX) packet 17 g, Daily PRN  acetaminophen (TYLENOL) tablet 650 mg, Q6H PRN   Or  acetaminophen (TYLENOL) suppository 650 mg, Q6H PRN  glucose (GLUTOSE) 40 % oral gel 15 g, PRN  dextrose 50 % IV solution, PRN  glucagon (rDNA) injection 1 mg, PRN  dextrose 5 % solution, PRN  naloxone (NARCAN) injection 0.4 mg, PRN  dextrose 10 % infusion, Continuous  [START ON 10/19/2021] vancomycin (VANCOCIN) 750 mg in dextrose 5 % 250 mL IVPB, Q24H  meropenem Yoel Piedra in the last 72 hours. Urine Chemistry: No results for input(s): Velna Overlie, PROTEINUR, NAUR in the last 72 hours. IMAGING:  XR CHEST PORTABLE   Final Result      1. Moderately severe diffuse bilateral airspace disease, new/increased from prior study. 2. Cardiomegaly. CT Head WO Contrast   Final Result      1. No acute intracranial hemorrhage or mass effect. Assessment/Plan   1. FAHEEM on CKD 3     2. HTN    3. Anemia    4. Acid- base/ Electrolyte imbalance     5. CHF     6.  AMS     Plan   - Pt has edema   - IV lasix x 1 dose   - Monitor UO and renal function   - labs in am   - BS was low - on D10   - cont abx   - monitor Vol status                 Thank you for allowing us to participate in care of Severiano Aguilar MD  Feel free to contact me   [facility] [address]  Office : [phone]  Fax :[phone]

## 2021-10-19 NOTE — DISCHARGE SUMMARY
INTERNAL MEDICINE DEPARTMENT AT [address] [place] [address]  DISCHARGE SUMMARY    Patient ID: Ilsa José Miguel                                             Discharge Date: 10/19/2021   Patient's PCP: Luz Laboy MD                                          Discharge Physician: Marcelo Bills MD   Admit Date: 10/18/2021   Admitting Physician: No admitting provider for patient encounter. PROBLEMS DURING HOSPITALIZATION:  Present on Admission:   Acute encephalopathy      DISCHARGE DIAGNOSES:    HPI:    The following issues were addressed during hospitalization:    Lainey Weston is a 47-year-old female with PMH CKD 4, DVT (2004; now on Eliquis), T2DM, narcotic-dependent chronic pain (on Methadone), systolic CHF, suspected RASHMI, bilateral lower foot wound s/p partial amputation who presented with altered mental status. Patient was recently discharged 10/16 (2 days prior to re-admission) during which she was diagnosed with osteomyelitis of feet and given wound care, multiple I&D and antibiotic therapy. She was discharged to skilled nursing facility with continued antibiotics for osteomyelitis.     This admission, per EMS and the facility she was found to be minimally responsive shortly after receiving her typical 140 mg of methadone in the morning. On arrival of the EMS she was given Narcan with immediate improvement in mental status, she was also noted to have glucose in the 30s and was given glucagon with some improvement. She was also noted to have hypoxia and was placed on nonrebreather oxygen as well as given Haldol and Versed for agitation. EKG showed sinus bradycardia and chest x-ray showed bilateral airspace disease which was new compared to previous study. She was admitted for further evaluation of her altered mental status. On the floor she was given D50 followed by started on D5 LR at 150 mL/h which resolved her hypoglycemia.  Podiatry was consulted to help manage ulcerations on patient's feet in light of her recent admission for osteomyelitis, she received I&D with partial cuboid resection of her right foot and I&D as well as DPC of L foot. Podiatry recommended wound vac application for patient which was arranged outpatient. She was continued on her anitbiotic regimen including Vancomycin and Meropenem. Overnight her mental status improved significantly, and she was AOX4 on examination on the morning of 10/19. She also was weaned off oxygen which she was initially placed on in ED (not on oxygen at home). On the day of discharge, patient continued to be AOX4. Her diabetes regimen was adjusted in light of her recent hypoglycemia. Patient was counseled and appropriate follow-up visits arranged. Physical Exam:  BP (!) 168/61   Pulse 84   Temp 97.4 °F (36.3 °C) (Axillary)   Resp 16   Ht 5' (1.524 m)   Wt 228 lb (103.4 kg)   LMP 10/23/2015   SpO2 (!) 89%   BMI 44.53 kg/m²     Physical Exam  Gen - Resting comfortably, no apparent distress. HEENT - AC/NT. Mucous membranes moist, oropharynx clear. CVS - Regular rate, rhythm. No murmurs/gallops/rubs. Lungs - CTA b/l. No wheezes/crackles/rubs. Abdomen - Obese abdomen. Soft, non-tender. Neuro - AOX3. CN II-XII grossly intact. Consults: Podiatry. Significant Diagnostic Studies: CT Head, CXR. Treatments: Dextrose infusion. Disposition: SNF. Discharged Condition: Stable  Follow Up: Primary Care Physician in 1 Week.     DISCHARGE MEDICATION:     Medication List      CONTINUE taking these medications    amitriptyline 100 MG tablet  Commonly known as: ELAVIL  TAKE 1 TABLET BY MOUTH EVERY NIGHT AT BEDTIME     ammonium lactate 12 % lotion  Commonly known as: LAC-HYDRIN     aspirin EC 81 MG EC tablet  Take 1 tablet by mouth daily     atorvastatin 20 MG tablet  Commonly known as: Lipitor  Take 1 tablet by mouth nightly     Eliquis 5 MG Tabs tablet  Generic drug: apixaban  TAKE 1 TABLET BY MOUTH TWICE DAILY     escitalopram 10 MG tablet  Commonly known as: LEXAPRO  Take 1 tablet by mouth daily     furosemide 40 MG tablet  Commonly known as: LASIX  Take 1 tablet by mouth daily     gabapentin 400 MG capsule  Commonly known as: Neurontin  Take 1 capsule by mouth 2 times daily for 90 days. Gauze Pads & Dressings Misc  Please dispense 4x8 guaze, kerlix, and ace     hydrALAZINE 50 MG tablet  Commonly known as: APRESOLINE  Take 1 tablet by mouth every 8 hours     Insulin Pen Needle 31G X 5 MM Misc  1 each by Does not apply route daily     Lancets Misc  1 each by Does not apply route 2 times daily PHARMACY MAY SUBSTITUTE TO TRUE METRIX LANCETS     meropenem 1 g injection  Commonly known as: MERREM     methadone 10 MG/ML solution  Commonly known as: DOLOPHINE     metoprolol succinate 50 MG extended release tablet  Commonly known as: TOPROL XL  Take 1 tablet by mouth daily     NovoLOG FlexPen 100 UNIT/ML injection pen  Generic drug: insulin aspart     nystatin 811384 UNIT/GM powder  Commonly known as: MYCOSTATIN  Apply topically 2 times daily Apply to right breast and groin area BID     omeprazole 20 MG delayed release capsule  Commonly known as: PRILOSEC  Take 1 capsule by mouth every morning     True Metrix Blood Glucose Test strip  Generic drug: blood glucose test strips  1 each by In Vitro route 2 times daily As needed. vancomycin 750 MG injection  Commonly known as: VANCOCIN        STOP taking these medications    Basaglar KwikPen 100 UNIT/ML injection pen  Generic drug: insulin glargine          Activity: As tolerated. Diet: Regular diet. Wound Care: NA. Time Spent on discharge is more than 15 mins.      Signed:  Vu Jerome MD, PGY-1  10/19/2021

## 2021-10-19 NOTE — CONSULTS
Clinical Pharmacy Consult Note    Vancomycin - Management by Pharmacy    Consult Date(s): 10/19/21  Consulting Provider(s): Dr. Guicho Landis / Plan    1) Hx of bilateral foot infection / osteomyelitis - Vancomycin + Meropenem   Concurrent Antimicrobials:   o Meropenem - Day # 18   Day of Vanc Therapy: #18   Current Dosing Method: AUC   Therapeutic Goal: 400-600 mg/mL*h    Current Dose / Frequency: 750mg IV q24h   Plan / Rationale:   o Pt has hx of CKD - baseline SCr ~2  o SCr is 1.6, from 1.4 yesterday. Will monitor closely. o Pt was discharged from [facility] on 10/16 on vancomycin 750 mg IV q24h. o Vancomycin level this AM = 12.3 mcg/mL, drawn ~23h after previous dose at [facility].   o Based on Bayesian kinetics, calculated AUC is 503 mg/L*h with steady-state trough of 16.1 mcg/mL. Will continue current dose of vancomycin.  Will continue to monitor clinical condition and make adjustments to regimen as appropriate. Thank you for consulting Pharmacy! Please call with questions--  Charlene Silva, PharmD, BCPS  Wireless: [id number]   10/19/2021 9:28 AM      Subjective/Objective: Ms. Diogo Frances is a 60 y.o. female with a PMHx significant for obesity, RASHMI, HFrEF, bilateral foot wounds s/p partial amputation, CKD, chronic pain, DM who presented 10/18 with AMS. Pt was found to be minimally responsive on 10/18 after receiving normal dose of methadone; noted to have BG in the 30s. Received glucagon and naloxone by EMS. In ED, pt was noted to be hypoxic and required NRB. Of note, pt was recently admitted 10/1-10/16 for bilateral foot wounds, found to have osteomyelitis requiring I&D. Wound cultures grew Pseudomonas, E. Coli and Enterococcus. Pt was discharged on vancomycin and meropenem with recommendations from ID to continue through 11/26. Patient admitted for work-up of altered mental status. Pharmacy has been consulted to dose vancomycin.     Pertinent Antimicrobials:  Meropenem 1000 mg IV q12h (10/2-current)   Vancomycin - pharmacy to dose (10/2-current)   750 mg IV q24h (10/15-current)    Of note, known to clinical pharmacy team from earlier admission in Oct 2021. Pt was eventually scheduled on vancomycin 750 mg IV q24h and discharged on this dose with plans to continue through 11/26/21. Height:   Ht Readings from Last 1 Encounters:   10/18/21 5' (1.524 m)     Weight:   Wt Readings from Last 1 Encounters:   10/18/21 228 lb (103.4 kg)       Level(s) / Doses:  Date Time Dose Level / Type of Level Interpretation   10/19 0630 750 mg IV q24h 12.3 mcg/mL - Random · Drawn ~23h after previous dose  · Predicted AUC = 503 mg/L*h with steady state trough of 16.1 mcg/mL          Note: Serum levels collected for AUC-based dosing may be high if collected in close proximity to the dose administered. This is not necessarily an indicator of toxicity. Cultures & Sensitivities:  Date Site Micro Susceptibility / Result   10/2 Foot wound Light growth Pseudomonas  Light growth E. Coli  Light growth Enteroccoccus Pseudomonas- sensitive to all tested except for ciprofloxacin    Enterococcus-- sensitive ampicillin, vancomycin    E. Coli -- sensitive to cefepime, cefuroxime, ertapenem, meropenem, Zosyn, Bactrim   10/7 R foot wound Rare growth Pseudomonas Sensitive to all tested except for ciprofloxacin   10/18 COVID-19, rapid Not detected    10/18 Blood Sent      Labs / Ancillary Data:    Estimated Creatinine Clearance: 42 mL/min (A) (based on SCr of 1.6 mg/dL (H)).     Recent Labs     10/18/21  1156 10/19/21  0630   CREATININE 1.4* 1.6*   BUN 28* 30*   WBC 12.9* 9.2

## 2021-10-19 NOTE — PROGRESS NOTES
Patient very drowsy throughout shift, respirations between 8-10. Patient will respond appropriately but then goes back to sleep. Resident contacted, no change to baseline since shift beginning. No new orders given.

## 2021-10-19 NOTE — PROGRESS NOTES
Speech Language Pathology  {SLP ALL NOTES:[id number]}                                                      Attempted to see pt for dysphagia evaluation. Reviewed chart, spoke with RN. Pt currently unavailable, being seen by other provider. Will re-attempt later as able.

## 2021-10-19 NOTE — PROGRESS NOTES
Speech Language Pathology  Facility/Department: [place] [address] [facility]   CLINICAL BEDSIDE SWALLOW EVALUATION    NAME: Baltazar Dallas  : 1965  MRN: [id number]    ADMISSION DATE: 10/18/2021  ADMITTING DIAGNOSIS: has Feet clawing; Diabetic neuropathy ([address] Hyperlipidemia; HTN (hypertension); Amenorrhea; Dyspareunia; Neuropathy; Bacterial vaginosis; Low back pain; Anomalies of nails; Tobacco abuse; Carpal tunnel syndrome; Scalp lesion; ETOH abuse; Pseudocyst, pancreas; Asthma; Nicotine addiction; Hypoxia; Onychomycosis; Depression; Anxiety state; Tinea pedis; Burn; Obesity (BMI 30-39.9); S/P foot surgery, right; Open wound of left foot with complication; Cellulitis; Bilateral lower extremity edema; Chronic multifocal osteomyelitis of left foot ([address] Acute systolic congestive heart failure ([address] Acute osteomyelitis of metatarsal bone of right foot ([address] Bronchitis; Cellulitis and abscess of foot; Group B streptococcal infection; Tendonitis, Achilles, right; Diabetic ulcer of right midfoot associated with type 2 diabetes mellitus, limited to breakdown of skin ([address] Opiate dependence ([address] Class 2 obesity due to excess calories with body mass index (BMI) of 37.0 to 37.9 in adult; CKD (chronic kidney disease), stage III ([address] Diabetic foot infection ([address] Chronic osteomyelitis of right foot with draining sinus ([address] Acute kidney injury superimposed on CKD ([address] Acute blood loss anemia; Aspiration pneumonitis ([address] Altered mental status; Chronic systolic heart failure ([address] Gastroesophageal reflux disease; Type 2 diabetes mellitus with right diabetic foot infection ([address] Systolic CHF, acute ([address] Type 2 diabetes mellitus with left diabetic foot infection ([address] Lymphedema of left leg; Lymphedema of right lower extremity; Charcot's joint of ankle; Elevated sed rate; Elevated C-reactive protein (CRP); History of transmetatarsal amputation of right foot ([facility] History of alcoholism ([facility] Ex-smoker;  History of MRSA infection; CKD (chronic kidney disease) stage 4, GFR 15-29 ml/min ([facility]); Acute on chronic congestive heart failure ([facility] and Acute encephalopathy on their problem list.  ONSET DATE: 10/18/2021    Recent Chest Xray: (10/18/2021)  Impression       1. Moderately severe diffuse bilateral airspace disease, new/increased from prior study. 2. Cardiomegaly.           Recent CT Head: (10/18/2021)  Impression       1. No acute intracranial hemorrhage or mass effect. Date of Eval: 10/19/2021  Evaluating Therapist: ALEXANDRA Urias    Current Diet level:  Current Diet : NPO  Current Liquid Diet : NPO      Primary Complaint  Patient Complaint: Denies difficulty swallowing. Pain:  Pain Assessment  Pain Assessment: Faces  Pain Level: 0  Brannon-Baker Pain Rating: No hurt  Patient's Stated Pain Goal: No pain  Response to Pain Intervention: Asleep with RR greater than 10    Reason for Referral  Adrianne Reyna was referred for a bedside swallow evaluation to assess the efficiency of her swallow function, identify signs and symptoms of aspiration and make recommendations regarding safe dietary consistencies, effective compensatory strategies, and safe eating environment. Impression  Dysphagia Diagnosis: Mild oral stage dysphagia  Dysphagia Impression : Patient presents with mild oral dysphagia in setting of reduced dentition. During bedside assessment of thins via cup, puree, and regular solid, pt demonstrated positive oral acceptance, prolonged but seemingly adequate mastication, positive swallow movement, good oral clearance, no overt signs of aspiration or penetration. Discussed soft diet options; however per pt preference recommend continue baseline diet of regular solids, thin liquids, with pt to independently select softer food items. Dysphagia Outcome Severity Scale: Level 5: Mild dysphagia- Distant supervision.  May need one diet consistency restricted     Treatment Plan  Requires SLP Intervention: Yes  Duration/Frequency of Treatment: 2-3x/wk for LOS       Recommended Diet and Intervention  Diet Solids Recommendation: Regular  Liquid Consistency Recommendation: Thin  Recommended Form of Meds: PO  Recommendations: Dysphagia treatment  Therapeutic Interventions: Diet tolerance monitoring;Patient/Family education    Compensatory Swallowing Strategies  Compensatory Swallowing Strategies: Upright as possible for all oral intake;Small bites/sips;Eat/Feed slowly    Treatment/Goals  Short-term Goals  Timeframe for Short-term Goals: 1-2 wks or LOS  Goal 1: Patient will tolerate least restrictive diet without overt signs of aspiration or associated decline in respiratory status. Goal 2: Patient/caregiver will demonstrate understanding of swallowing concerns/recommendations. General  Chart Reviewed: Yes  Comments: Per admitting H&P (10/18/2021): Pankaj Duke is a 49-year-old female with past medical history of obesity, RASHMI, systolic CHF, bilateral lower foot wound s/p partial amputation, CKD, chronic pain on methadone, diabetes mellitus who presented with altered mental status. Patient was discharged 2 days back for recent admission for CHF exacerbation and was noted to have significant fluctuations in her mental status during the hospital stay also needing BiPAP due to hypercarbia and hypoxia. She was discharged to skilled nursing facility with continued antibiotics for osteomyelitis. Per EMS and the facility she was found to be minimally responsive shortly after receiving her typical 140 mg of methadone in the morning and therefore EMS was called. On arrival of the EMS she was noted to have glucose in the 30s and was given glucagon with improvement. She also received Narcan 2 mg with improvement in her mental status.   On arrival to the ED, she was also noted to have hypoxia and was placed on the nonrebreather oxygen and was noted to be very agitated, shouting and being violent with the staff s/p given Haldol and Versed. She was later noted to have glucose levels in the 30s and was given D50 followed by started on D5 LR at 150 mL/h. Blood cultures were sent and her antibiotic for osteomyelitis were continued. EKG showed sinus bradycardia and chest x-ray showed bilateral airspace disease which was new than the previous study. Anshu decision was made to admit her to the floor for further evaluation of her altered mental status. Patient was admitted few weeks back with complaints of leg swelling and pain and was therefore treated with antibiotics for osteomyelitis and podiatry was consulted s/p I&D with left fifth resection, right fourth ray resection, right partial cuboid resection. ''  Subjective  Subjective: Patient awake, alert, resting in bed, on 5L O2 via nc  Behavior/Cognition: Alert; Cooperative  Respiratory Status: O2 via nasual cannula  O2 Device: Nasal cannula  Liters of Oxygen: 5 L  Communication Observation: Functional  Follows Directions: Simple  Dentition: Dentures top  Patient Positioning: Upright in bed  Baseline Vocal Quality: Hoarse  Volitional Cough: Strong  Prior Dysphagia History: None found in chart review  Consistencies Administered: Reg solid; Dysphagia Pureed (Dysphagia I); Thin    Vision/Hearing  Vision  Vision: Within Functional Limits  Hearing  Hearing: Within functional limits    Oral Motor Deficits  Oral/Motor  Oral Motor: Within functional limits    Prognosis  Prognosis  Prognosis for safe diet advancement: good  Individuals consulted  Consulted and agree with results and recommendations: Patient;RN    Education  Patient Education: Educated pt to purpose of visit, swallow function, diet recommendations, strategies. Patient Education Response: Verbalizes understanding  Safety Devices in place: Yes  Type of devices: All fall risk precautions in place; Left in bed;Call light within reach; Bed alarm in place;Nurse notified       Therapy Time  SLP Individual Minutes  Time In: 0840  Time Out: 0900  Minutes: 20     SLP Total Treatment Time  Timed Code Treatment Minutes: 0 Minutes  Total Treatment Time: 20    Plan  Diet Recommendations: thin liquids / regular solids (pt to independently select soft items) / meds PO; as tolerated. If signs of aspiration or associated decline in respiratory status arise, recommend withhold PO and contact speech. Discharge Plan:  TBD  Discussed with RN, Be Neff. Needs within reach. Electronically Signed by:  Jenna Gonzalez M.A., Jose Andrew   Speech-Language Pathologist  Pager #[phone]    This document will serve as a discharge summary if pt discharges before next treatment.

## 2021-10-20 NOTE — DISCHARGE SUMMARY
INTERNAL MEDICINE DEPARTMENT AT [address] [place] [address]  DISCHARGE SUMMARY    Patient ID: Sakina Ann                                             Discharge Date: 10/20/2021   Patient's PCP: Rene Restrepo MD                                          Discharge Physician: Tracey Garcia MD   Admit Date: 10/18/2021   Admitting Physician: Néstor Cruz MD    PROBLEMS DURING HOSPITALIZATION:  Present on Admission:   Acute encephalopathy      DISCHARGE DIAGNOSES:    HPI:    The following issues were addressed during hospitalization:    Ginger Bolton is a 24-year-old female with PMH CKD 4, DVT (2004; now on Eliquis), T2DM, narcotic-dependent chronic pain (on Methadone), systolic CHF, suspected RASHMI, bilateral lower foot wound s/p partial amputation who presented with altered mental status. Patient was recently discharged 10/16 (2 days prior to re-admission) during which she was diagnosed with osteomyelitis of feet and given wound care, multiple I&D and antibiotic therapy. She was discharged to skilled nursing facility with continued antibiotics for osteomyelitis.     This admission, per EMS and the facility she was found to be minimally responsive shortly after receiving her typical 140 mg of methadone in the morning. On arrival of the EMS she was given Narcan with immediate improvement in mental status, she was also noted to have glucose in the 30s and was given glucagon with some improvement. She was also noted to have hypoxia and was placed on nonrebreather oxygen as well as given Haldol and Versed for agitation. EKG showed sinus bradycardia and chest x-ray showed bilateral airspace disease which was new compared to previous study. She was admitted for further evaluation of her altered mental status. On the floor she was given D50 followed by started on D5 LR at 150 mL/h which resolved her hypoglycemia.  Podiatry was consulted to help manage ulcerations on patient's feet in light of her recent admission for osteomyelitis, she received I&D with partial cuboid resection of her right foot and I&D as well as DPC of L foot. Podiatry recommended wound vac application for patient which was arranged outpatient. She was continued on her anitbiotic regimen including Vancomycin and Meropenem. Overnight her mental status improved significantly, and she was AOX4 on examination on the morning of 10/20. She also was weaned back to her baseline oxygen requirement. On the day of discharge, patient continued to be AOX4. Her diabetes regimen was adjusted in light of her recent hypoglycemia, Lantus decreased to 25 and given 8U mealtime insulin. Patient was counseled and appropriate follow-up visits arranged. Physical Exam:  /82   Pulse 79   Temp 98.3 °F (36.8 °C) (Oral)   Resp 10   Ht 5' (1.524 m)   Wt 223 lb 1.7 oz (101.2 kg)   LMP 10/23/2015   SpO2 91%   BMI 43.57 kg/m²     Physical Exam  Gen - Resting comfortably, no apparent distress. HEENT - AC/NT. Mucous membranes moist, oropharynx clear. CVS - Regular rate, rhythm. No murmurs/gallops/rubs. Lungs - CTA b/l. No wheezes/crackles/rubs. Abdomen - Obese abdomen. Soft, non-tender. Neuro - AOX3. CN II-XII grossly intact. Consults: Podiatry. Significant Diagnostic Studies: CT Head, CXR. Treatments: Dextrose infusion. Disposition: SNF. Discharged Condition: Stable  Follow Up: Primary Care Physician in 1 Week.     DISCHARGE MEDICATION:     Medication List      START taking these medications    pantoprazole 40 MG tablet  Commonly known as: PROTONIX  Take 1 tablet by mouth every morning (before breakfast)  Start taking on: October 21, 2021  Replaces: omeprazole 20 MG delayed release capsule        CHANGE how you take these medications    Lantus SoloStar 100 UNIT/ML injection pen  Generic drug: insulin glargine  Inject 25 Units into the skin nightly  What changed:   · how much to take  · when to take this        CONTINUE taking these medications amitriptyline 100 MG tablet  Commonly known as: ELAVIL  TAKE 1 TABLET BY MOUTH EVERY NIGHT AT BEDTIME     ammonium lactate 12 % lotion  Commonly known as: LAC-HYDRIN     atorvastatin 20 MG tablet  Commonly known as: Lipitor  Take 1 tablet by mouth nightly     Eliquis 5 MG Tabs tablet  Generic drug: apixaban  TAKE 1 TABLET BY MOUTH TWICE DAILY     escitalopram 10 MG tablet  Commonly known as: LEXAPRO  Take 1 tablet by mouth daily     furosemide 40 MG tablet  Commonly known as: LASIX  Take 1 tablet by mouth daily     gabapentin 400 MG capsule  Commonly known as: Neurontin  Take 1 capsule by mouth 2 times daily for 90 days. Gauze Pads & Dressings Misc  Please dispense 4x8 guaze, kerlix, and ace     hydrALAZINE 50 MG tablet  Commonly known as: APRESOLINE  Take 1 tablet by mouth every 8 hours     Insulin Pen Needle 31G X 5 MM Misc  1 each by Does not apply route daily     Lancets Misc  1 each by Does not apply route 2 times daily PHARMACY MAY SUBSTITUTE TO TRUE METRIX LANCETS     meropenem 1 g injection  Commonly known as: MERREM     methadone 10 MG/ML solution  Commonly known as: DOLOPHINE     metoprolol succinate 50 MG extended release tablet  Commonly known as: TOPROL XL  Take 1 tablet by mouth daily     NovoLOG FlexPen 100 UNIT/ML injection pen  Generic drug: insulin aspart     nystatin 398999 UNIT/GM powder  Commonly known as: MYCOSTATIN  Apply topically 2 times daily Apply to right breast and groin area BID     True Metrix Blood Glucose Test strip  Generic drug: blood glucose test strips  1 each by In Vitro route 2 times daily As needed.      vancomycin 750 MG injection  Commonly known as: VANCOCIN        STOP taking these medications    aspirin EC 81 MG EC tablet     omeprazole 20 MG delayed release capsule  Commonly known as: PRILOSEC  Replaced by: pantoprazole 40 MG tablet           Where to Get Your Medications      These medications were sent to [facility] [place] [facility], [address] Krystal Houser [address] [place] [address] [address] [facility] [address]    Phone: [phone]   · Lantus SoloStar 100 UNIT/ML injection pen  · pantoprazole 40 MG tablet       Activity: As tolerated. Diet: Regular diet. Wound Care: NA. Time Spent on discharge is more than 15 mins. Signed:  Lance Olivo MD, PGY-1  10/20/2021     Patient seen and examined, labs and imaging reviewed, agree with assessment and plan as outlined above. patients condition continues to improve doing well, asked patient to monitor side effects of medications which i've discussed at length, recurrence of symptoms or new symptoms including but not limited to chest pain, shortness of breath, nausea, vomiting, fevers or chills and seek immediate medical attention or call 911. Greater than 30 minutes spent on case on day of discharge.      Leeann Leventhal MD FACP

## 2021-10-20 NOTE — PROGRESS NOTES
Clinical Pharmacy Consult Note    Vancomycin - Management by Pharmacy    Consult Date(s): 10/19/21  Consulting Provider(s): Dr. Daniel Valencia / Plan  1) Hx of bilateral foot infection / osteomyelitis - Vancomycin + Meropenem   Concurrent Antimicrobials:   o Meropenem - Day # 23   Day of Vanc Therapy: #19   Current Dosing Method: AUC   Therapeutic Goal: 400-600 mg/mL*h    Current Dose / Frequency: 750mg IV q24h   Plan / Rationale:   o Pt was discharged from [facility] on 10/16 on vancomycin 750 mg IV q24h.  Continue 750mg IV q24h. Level drawn 10/19 predicts an AUC = 503 mg/L*h with steady-state trough of 16.1 mcg/mL.  Will continue to monitor clinical condition and make adjustments to regimen as appropriate. Please call with questions--  Thanks--  Reilly Osuna, PharmD, [address]  [id number] ([facility])   10/20/2021 2:08 PM      Subjective/Objective: Ms. Ilsa Valdez is a 60 y.o. female with a PMHx significant for obesity, RASHMI, HFrEF, bilateral foot wounds s/p partial amputation, CKD, chronic pain, DM who presented 10/18 with AMS. Pt was found to be minimally responsive on 10/18 after receiving normal dose of methadone; noted to have BG in the 30s. Received glucagon and naloxone by EMS. In ED, pt was noted to be hypoxic and required NRB. Of note, pt was recently admitted 10/1-10/16 for bilateral foot wounds, found to have osteomyelitis requiring I&D. Wound cultures grew Pseudomonas, E. Coli and Enterococcus. Pt was discharged on vancomycin and meropenem with recommendations from ID to continue through 11/26. Patient admitted for work-up of altered mental status. Pharmacy has been consulted to dose vancomycin. Pertinent Antimicrobials:  Meropenem 1000 mg IV q12h (10/2-current)   Vancomycin - pharmacy to dose (10/2-current)   750 mg IV q24h (10/15-current)    Of note, known to clinical pharmacy team from earlier admission in Oct 2021.  Pt was eventually scheduled on vancomycin 750 mg IV q24h and discharged on this dose with plans to continue through 11/26/21. Height:   Ht Readings from Last 1 Encounters:   10/18/21 5' (1.524 m)     Weight:   Wt Readings from Last 1 Encounters:   10/20/21 223 lb 1.7 oz (101.2 kg)       Level(s) / Doses:  Date Time Dose Level / Type of Level Interpretation   10/19 0630 750 mg IV q24h 12.3 mcg/mL - Random · Drawn ~23h after previous dose  · Predicted AUC = 503 mg/L*h with steady state trough of 16.1 mcg/mL          Note: Serum levels collected for AUC-based dosing may be high if collected in close proximity to the dose administered. This is not necessarily an indicator of toxicity. Cultures & Sensitivities:  Date Site Micro Susceptibility / Result   10/2 Foot wound Light growth Pseudomonas  Light growth E. Coli  Light growth Enteroccoccus Pseudomonas- sensitive to all tested except for ciprofloxacin    Enterococcus-- sensitive ampicillin, vancomycin    E. Coli -- sensitive to cefepime, cefuroxime, ertapenem, meropenem, Zosyn, Bactrim   10/7 R foot wound Rare growth Pseudomonas Sensitive to all tested except for ciprofloxacin   10/18 COVID-19, rapid Not detected    10/18 Blood No growth to date      Labs / Ancillary Data:    Estimated Creatinine Clearance: 39 mL/min (A) (based on SCr of 1.7 mg/dL (H)).     Recent Labs     10/18/21  1156 10/19/21  0630 10/20/21  0759   CREATININE 1.4* 1.6* 1.7*   BUN 28* 30* 44*   WBC 12.9* 9.2 7.7

## 2021-10-20 NOTE — CONSULTS
NUTRITION NOTE   Admission Date: 10/18/2021     Type and Reason for Visit: Initial, Positive Nutrition Screen    NUTRITION RECOMMENDATIONS:   1. PO Diet: Continue regular; 3CC; low sodium diet  2. ONS: Start Joshua BID    NUTRITION ASSESSMENT:  Pt +screen for wounds. Pt sleeping upon attempted encounters, did not wake at this time. Pt with good po intakes per EMR, all meals noted >76% po. Noted plan for dc to SNF this evening. If not discharged then RD to send wound healing oral nutrition supplements to increase protein intakes for wound healing. Will continue to monitor per [facility]. Patient admitted d/t AMS    PMH significant for: CKD 4,DVT, T2DM, narcotic dependent chronic pain, CHF, bilateral lower foot wound s/p partial amputation     MALNUTRITION ASSESSMENT  Context of Malnutrition: Acute Illness   Malnutrition Status: No malnutrition    NUTRITION DIAGNOSIS   · Increased nutrient needs related to increase demand for energy/nutrients as evidenced by wounds      NUTRITION INTERVENTION  Food and/or Nutrient Delivery:  Continue Current Diet, Start Oral Nutrition Supplement  Nutrition Education/Counseling:  No recommendation at this time      The patient will still be monitored per nutrition standards of care. Consult dietitian if nutrition interventions essential to patient care is needed.      Bonnie Cano, [address] [place] [address], [address]:  [phone]  Office:  [phone]

## 2021-10-20 NOTE — CARE COORDINATION
Case Management Assessment            Discharge Note                    Date / Time of Note: 10/20/2021 1:20 PM                  Discharge Note Completed by: Ervin Cox RN    Patient Name: Wilber Clifford   YOB: 1965  Diagnosis: Encephalopathy [L46.41]  Systolic CHF, acute on chronic ([facility] [I50.23]  Acute encephalopathy [G93.40]  Acute hypoxemic respiratory failure ([facility] [J96.01]   Date / Time: 10/18/2021 11:38 AM    Current PCP: Marisel Lancaster MD  Clinic patient: No    Hospitalization in the last 30 days: No    Advance Directives:  Code Status: Full Code  [place] DNR form completed and on chart: Not Indicated    Financial:  Payor: Nestor Sauceda / Plan: Johanny Mcguire / Product Type: *No Product type* /      Pharmacy:    [place] [address] [address] [place] [address] [phone] - F [phone]  [facility] [address]  Phone: [phone] Fax: [phone]      Assistance purchasing medications?:    Assistance provided by Case Management: None at this time    Does patient want to participate in local refill/ meds to beds program?:      Meds To Beds General Rules:  1. Can ONLY be done Monday- Friday between 8:30am-5pm  2. Prescription(s) must be in pharmacy by 3pm to be filled same day  3. Copy of patient's insurance/ prescription drug card and patient face sheet must be sent along with the prescription(s)  4. Cost of Rx cannot be added to hospital bill. If financial assistance is needed, please contact unit  or ;  or  CANNOT provide pharmacy voucher for patients co-pays  5.  Patients can then  the prescription on their way out of the hospital at discharge, or pharmacy can deliver to the bedside if staff is available. (payment due at time of pick-up or delivery - cash, check, or card accepted)     Able to afford home medications/ co-pay costs: Yes    ADLS:  Current PT AM-PAC Score:   /24  Current OT AM-PAC Score:   /24      DISCHARGE Disposition: [place] (SNF): [place]   Phone: [phone] Fax: [phone]    LOC at discharge: Skilled  Joan Bernal Completed: Yes    Notification completed in HENS/PAS?:  Not Applicable use prev HENS    IMM Completed:   Not Indicated    Transportation:  Transportation PLAN for discharge: EMS transportation   Mode of Transport: Ambulance stretcher - BLS  Reason for medical transport: Other: NON wt bearing bLE   Name of [address] [place] [address],[address]: [facility]  Phone: [phone]  Time of Transport: 630-7p    Transport form completed: Yes        Additional CM Notes: Pt will [facility] to [facility] per [address] can take today. Transport set up for 630-7p with [address] [phone], nurse to call report to [phone] orders faxed to [phone]. The Plan for Transition of Care is related to the following treatment goals of Encephalopathy [Z40.66]  Systolic CHF, acute on chronic (HCC) [I50.23]  Acute encephalopathy [G93.40]  Acute hypoxemic respiratory failure ([address] [J96.01]    The Patient and/or patient representative Charolet Hamman and her family were provided with a choice of provider and agrees with the discharge plan Yes    Freedom of choice list was provided with basic dialogue that supports the patient's individualized plan of care/goals and shares the quality data associated with the providers.  Yes    Care Transitions patient: No    Bianca Ceja RN  [facility]  Case Management Department  Ph: [phone]  Fax: [phone]

## 2021-10-20 NOTE — PROGRESS NOTES
Report called to 5S RN Denson Siemens. All questions answered.  Electronically signed by Pretty Gaxiola RN on 10/19/2021 at 10:59 PM

## 2021-10-20 NOTE — PROGRESS NOTES
Physician Progress Note      Roverto Siu  [facility] #:                  [id number]  :                       1965  ADMIT DATE:       10/18/2021 11:38 AM  DISCH DATE:  RESPONDING  PROVIDER #:        SHINE VAZHAPPILLY          QUERY TEXT:    Pt admitted with hypoglycemia and has Altered mental status likely 2/2   Hypoglycemia vs methadone use documented. If possible, please document in   progress notes and discharge summary further specificity regarding the type of   encephalopathy:      The medical record reflects the following:  Risk Factors: 61 yo w/ hypoglycemia in the 30's, home methadone use  Clinical Indicators: Per H&P: Altered mental status likely 2/2 Hypoglycemia vs   methadone use. On arrival to the ED, she was  noted to be very agitated,   shouting and being violent with the staff s/p given Haldol and Versed. Per PN   10/19: Patient AOX4 this a.m. Continues to be drowsy however much less   compared to initial admission. Treatment: Haldol and Versed, bed alarm, Narcan, holding home dose of   Methadone, Avasys camera  Options provided:  -- Metabolic encephalopathy  -- Toxic encephalopathy  -- Encephalopathy multifactorial, toxic and metabolic  -- Other - I will add my own diagnosis  -- Disagree - Not applicable / Not valid  -- Disagree - Clinically unable to determine / Unknown  -- Refer to Clinical Documentation Reviewer    PROVIDER RESPONSE TEXT:    This patient has metabolic encephalopathy.     Query created by: Danelle Chambers on 10/19/2021 12:24 PM      Electronically signed by:  Srinivas Garcia 10/20/2021 10:31 AM

## 2021-10-20 NOTE — PROGRESS NOTES
Nephrology Note                                                                                                                                                                                                                                                                                                                                                               Office : [phone]     Fax :[phone]              Patient's Name: Catie Blanco    10/19/2021    Reason for Consult:  Vol management   Requesting Physician:  Marcus Beckett MD      Doing much better   Cr stable   On lasix     Past Medical History:   Diagnosis Date    Asthma 2004    Bacterial vaginosis 2008    Carpal tunnel syndrome 2007    [facility]     Diabetes mellitus type II 2007    10/1/20 pt states does accucheck 2x/day at home    Diabetic neuropathy [facility]     Diastolic CHF ([address]     DVT (deep venous thrombosis) ([facility] 2004    Dyslipidemia 2009    Dyspareunia 2009    ETOH abuse 2007    HTN (hypertension)     Hx of blood clots     Hyperlipidemia     MRSA (methicillin resistant staph aureus) culture positive 2017; 2017    foot; leg     Neuropathy 2009    polyneuropathy    Pancreatitis 2004    Tobacco abuse 2008    Uses wheelchair     also uses walker       Past Surgical History:   Procedure Laterality Date     SECTION  unknown    FOOT DEBRIDEMENT Right 2019    INCISION AND DRAINAGE WITH APPLICATION OF STRAVIX GRAFT RIGHT FOOT performed by Tana Hampton DPM at [address] Right 2019    RIGHT FOOT INCISION AND DRAINAGE WITH STAGING TRANSMETATARSAL AMPUTATION performed by Tana Hampton DPM at [address] Right 2019    RIGHT FOOT DEBRIDEMENT INCISION AND DRAINAGE, OPEN DIABETIC FOOT ULCER WITH GRAFT PLACEMENT performed by Tana Hampton DPM at [address] [facility] [address] DEBRIDEMENT Left 10/23/2019    LEFT FOOT INCISION AND DRAINAGE , DEBRIDEMENT OF OPEN WOUND, APPLICATION OF STRAVIX GRAFT performed by Lynne Moreno DPM at [address] Right 3/29/2021    INCISION AND DRAINAGE, DEBRIDEMENT OF DIABETIC WOUND WITH PLACEMENT OF STRAVIX GRAFT RIGHT FOOT performed by Lynne Moreno DPM at [address]  10/7/2021    BILATERAL LOWER EXTREMITY INCISION AND DRAINAGE, RIGHT FOOT 4TH RAY RESECTION, LEFT FOOT 5TH RAY RESECTION performed by Lynne Moreno DPM at [address] Bilateral 10/13/2021    REPEAT INCISION AND DRAINAGE OF ALL NONVIABLE SOFT TISSUE AND BONE/ PARTIAL CUBOID RESECTION/ BONY BIOPSY AS NEEDED/ WOUND VAC RIGHT LOWER EXTREMITY performed by Lynne Moreno DPM at [address] IR TUNNELED [address] 5 YEARS  10/5/2021    IR TUNNELED CATHETER PLACEMENT GREATER THAN 5 YEARS 10/5/2021 [facility] SPECIAL PROCEDURES    KNEE SURGERY Left     from falling off ladder -- has screws in place pt report    OTHER SURGICAL HISTORY Left 05/25/2016    I & D left foot    OTHER SURGICAL HISTORY Right 10/20/2017    RIGHT GASTROC LENGTHENING ENDOSCOPIC, INJECTION OF AMNI GRAFT    OTHER SURGICAL HISTORY Right 04/26/2018    Diabetic foot ulcer I&D w/ integra graft application    MN DEBRIDEMENT, SKIN, SUB-Q TISSUE,MUSCLE,BONE,=<20 SQ CM Right 8/17/2018    RIGHT FOOT DEBRIDEMENT INCISION AND DRAINAGE, PARTIAL 5TH RAY AMPUTATION performed by Lynne Moreno DPM at [address] / [address]  6/1757    cryotherapy done on lesion    TOE AMPUTATION Left 02/24/2017    AMPUTATION LEFT GREAT TOE                 TONSILLECTOMY         History reviewed. No pertinent family history. reports that she quit smoking about 3 years ago. Her smoking use included cigarettes. She has a 30.00 pack-year smoking history.  She has never used smokeless tobacco. She reports that she does not drink alcohol and does not use drugs.     Allergies:  Sulfa antibiotics    Current Medications:    amitriptyline (ELAVIL) tablet 100 mg, Nightly  aspirin EC tablet 81 mg, Daily  atorvastatin (LIPITOR) tablet 20 mg, Nightly  ammonium lactate (LAC-HYDRIN) 12 % lotion, Daily  apixaban (ELIQUIS) tablet 5 mg, BID  escitalopram (LEXAPRO) tablet 10 mg, Daily  gabapentin (NEURONTIN) capsule 400 mg, BID  hydrALAZINE (APRESOLINE) tablet 50 mg, 3 times per day  pantoprazole (PROTONIX) tablet 40 mg, QAM AC  hydrOXYzine (VISTARIL) capsule 50 mg, Q8H PRN  sodium chloride flush 0.9 % injection 5-40 mL, 2 times per day  sodium chloride flush 0.9 % injection 5-40 mL, PRN  0.9 % sodium chloride infusion, PRN  ondansetron (ZOFRAN-ODT) disintegrating tablet 4 mg, Q8H PRN   Or  ondansetron (ZOFRAN) injection 4 mg, Q6H PRN  polyethylene glycol (GLYCOLAX) packet 17 g, Daily PRN  acetaminophen (TYLENOL) tablet 650 mg, Q6H PRN   Or  acetaminophen (TYLENOL) suppository 650 mg, Q6H PRN  glucose (GLUTOSE) 40 % oral gel 15 g, PRN  dextrose 50 % IV solution, PRN  glucagon (rDNA) injection 1 mg, PRN  dextrose 5 % solution, PRN  naloxone (NARCAN) injection 0.4 mg, PRN  vancomycin (VANCOCIN) 750 mg in dextrose 5 % 250 mL IVPB, Q24H  meropenem (MERREM) 1,000 mg in sodium chloride 0.9 % 100 mL IVPB (mini-bag), Q12H        Review of Systems:   AMS       Physical exam:     Vitals:  BP (!) 164/63   Pulse 97   Temp 99.9 °F (37.7 °C) (Oral)   Resp 19   Ht 5' (1.524 m)   Wt 228 lb (103.4 kg)   LMP 10/23/2015   SpO2 92%   BMI 44.53 kg/m²   Constitutional:  Lethargic   Skin: no rash, turgor wnl  Heent:  eomi, mmm  Neck: no bruits or jvd noted  Cardiovascular:  S1, S2 without m/r/g  Respiratory: CTA B without w/r/r  Abdomen:  +bs, soft, nt, nd  Ext: + lower extremity edema  Psychiatric: mood and affect flat   Musculoskeletal:  Rom, muscular strength intact    Data:   Labs:  CBC:   Recent Labs     10/18/21  1156 10/19/21  0630   WBC 12.9* 9.2   HGB 9.3* 8.1*    327     BMP: Electrolyte imbalance     5. CHF     6.  AMS     Plan   - Pt has edema   - PO lasix   - edema better   - avoid sedatives    - Monitor UO and renal function   - labs in am   - cont abx   - monitor Vol status                 Thank you for allowing us to participate in care of Cindy Fowler MD  Feel free to contact me   [facility] [address]  Office : [phone]  Fax :[phone]

## 2021-10-20 NOTE — DISCHARGE INSTR - DIET

## 2021-10-20 NOTE — PROGRESS NOTES
Speech Language Pathology  Facility/Department: [address] [facility] SURGERY  Dysphagia Daily Treatment Note/DC    NAME: Remberto Burleson  : 1965  MRN: [id number]    Patient Diagnosis(es):   Patient Active Problem List    Diagnosis Date Noted    Acute encephalopathy 10/18/2021    Acute on chronic congestive heart failure (HCC)     CKD (chronic kidney disease) stage 4, GFR 15-29 ml/min ([facility])     Type 2 diabetes mellitus with left diabetic foot infection ([address]     Lymphedema of left leg     Lymphedema of right lower extremity     Charcot's joint of ankle     Elevated sed rate     Elevated C-reactive protein (CRP)     History of transmetatarsal amputation of right foot ([address]     History of alcoholism ([address]     Ex-smoker     History of MRSA infection     Systolic CHF, acute ([address] 10/01/2021    Type 2 diabetes mellitus with right diabetic foot infection ([address] 2021    Chronic systolic heart failure ([address] 2020    Gastroesophageal reflux disease 2020    Aspiration pneumonitis ([address] 06/10/2019    Altered mental status     Acute kidney injury superimposed on CKD ([address] 2019    Acute blood loss anemia 2019    Opiate dependence ([address] 2019    Class 2 obesity due to excess calories with body mass index (BMI) of 37.0 to 37.9 in adult 2019    CKD (chronic kidney disease), stage III ([address] 2019    Diabetic foot infection ([address] 2019    Chronic osteomyelitis of right foot with draining sinus ([address] 2019    Diabetic ulcer of right midfoot associated with type 2 diabetes mellitus, limited to breakdown of skin ([address] 2019    Tendonitis, Achilles, right 2019    Group B streptococcal infection 2019    Cellulitis and abscess of foot 2019    Bronchitis 2019    Acute osteomyelitis of metatarsal bone of right foot ([address] 15/80/7375    Acute systolic congestive heart failure ([address]     Cellulitis 2018    Bilateral lower extremity edema 03/27/2018    Chronic multifocal osteomyelitis of left foot ([address] 03/27/2018    Open wound of left foot with complication 37/07/3283    S/P foot surgery, right 06/03/2016    Obesity (BMI 30-39.9) 09/23/2015    Burn 01/05/2015    Tinea pedis 08/22/2013    Depression 05/17/2013    Anxiety state 05/17/2013    Onychomycosis 11/15/2012    Hypoxia 07/21/2011    Nicotine addiction 07/15/2011    Feet clawing     Diabetic neuropathy (HCC)     HTN (hypertension)     Amenorrhea     Anomalies of nails     Hyperlipidemia 05/01/2009    Dyspareunia 05/01/2009    Neuropathy 05/01/2009    Bacterial vaginosis 04/01/2008    Low back pain 04/01/2008    Tobacco abuse 03/01/2008    Scalp lesion 08/01/2007    Carpal tunnel syndrome 05/01/2007    ETOH abuse 03/04/2007    Pseudocyst, pancreas 05/14/2004    Asthma 05/14/2004     Allergies: Allergies   Allergen Reactions    Sulfa Antibiotics Hives, Itching and Rash     Recent Chest Xray: (10/18/2021)  Impression       1. Moderately severe diffuse bilateral airspace disease, new/increased from prior study. 2. Cardiomegaly.          Recent CT Head: (10/18/2021)  Impression       1. No acute intracranial hemorrhage or mass effect.        Previous MBS   Chart reviewed. Medical Diagnosis: encephalopathy  Treatment Diagnosis: dysphagia    BSE Impression 10/19/21  Patient presents with mild oral dysphagia in setting of reduced dentition. During bedside assessment of thins via cup, puree, and regular solid, pt demonstrated positive oral acceptance, prolonged but seemingly adequate mastication, positive swallow movement, good oral clearance, no overt signs of aspiration or penetration. Discussed soft diet options; however per pt preference recommend continue baseline diet of regular solids, thin liquids, with pt to independently select softer food items.     MBS results - not indicated    Pain: denies    Current Diet : regular Ray Salguero liquids    Treatment:  Pt seen bedside to address the following goals:  Goal 1: Patient will tolerate least restrictive diet without overt signs of aspiration or associated decline in respiratory status  10/20: pt sitting up in bed with breakfast tray. Pt demonstrated adequate mastication with solid textures, despite limited dentition. Pt exhibited no overt signs of aspiration with thin liquids, including 3 ounces of uninterrupted swallows of liquids. Pt voice remained clear, no respiratory decline per chart review  Goal met  Goal 2: Patient/caregiver will demonstrate understanding of swallowing concerns/recommendations. 10/20: pt asking why we were seeing her for swallowing, explained rationale. Pt educated to s/s of aspiration and concern if aspiration occurs. Pt instructed to notify staff if any signs emerge. Pt stated comprehension and agreeable  Goal met    Patient/Family/Caregiver Education:  As above    Compensatory Strategies:  90 degrees all meals     Plan:    Diet recommendations: cont regular diet/thin liquids  DC recommendation: no follow up indicated  Treatment: 15  D/W nursing student   Needs met prior to leaving room, call button in reach. Azar Galo M.S./[facility] #[id number]  Pg.  # [phone]

## 2021-10-20 NOTE — PROGRESS NOTES
Podiatric Surgery Daily Progress Note  Trina Cleveland      Subjective :   Patient seen and examined this am at the bedside. Patient resting comfortably, patient awakens to verbal, physical stimuli. Patient falls asleep throughout examination. Review of Systems: Unable to obtain 2/2 patient status       Objective     /60   Pulse 80   Temp 99.1 °F (37.3 °C) (Oral)   Resp 18   Ht 5' (1.524 m)   Wt 223 lb 1.7 oz (101.2 kg)   LMP 10/23/2015   SpO2 92%   BMI 43.57 kg/m²     I/O:    Intake/Output Summary (Last 24 hours) at 10/20/2021 0823  Last data filed at 10/19/2021 2138  Gross per 24 hour   Intake 770 ml   Output 2600 ml   Net -1830 ml              Wt Readings from Last 3 Encounters:   10/20/21 223 lb 1.7 oz (101.2 kg)   10/15/21 228 lb 2.8 oz (103.5 kg)   08/12/21 200 lb (90.7 kg)       LABS:    Recent Labs     10/19/21  0630 10/20/21  0759   WBC 9.2 7.7   HGB 8.1* 7.7*   HCT 24.6* 23.1*    300        Recent Labs     10/19/21  0630      K 4.5      CO2 30   BUN 30*   CREATININE 1.6*        Recent Labs     10/18/21  1156 10/19/21  0630   PROT 6.7 5.9*   INR 1.35*  --            LOWER EXTREMITY EXAMINATION    Dressing to right LE intact. No strikethrough noted to the external dressing. Mild sanguinous drainage noted to the internal layers of the dressing of right LE. Dressing to left LE left clean, dry, intact. FOCUSED RIGHT LOWER EXTREMITY EXAMINATION:    VASCULAR: DP and PT pulses nonpalpable 0/4. Upon hand-held doppler examination, DP and PT pulses noted to have weakly biphasic signals. CFT is brisk to the distal aspect of the foot. Skin temperature is warm to cool from proximal to distal. +1 pitting edema noted to right LE.  No pain with calf compression.     NEUROLOGIC: Unable to be obtained.     DERMATOLOGIC: Chronic dermatological changes noted.     Right Lower extremity:     Full-thickness ulceration noted to the plantar lateral aspect of the right foot measuring approximately 4.0 cm x 5.0 cm x 4.0 cm. Wound base is a combination of granular and fibrotic tissue. Wound probes to bone, does not tunnel or track. No fluctuance or crepitus noted  No periwound erythema noted. No malodor noted.         Surgical incision noted at the lateral aspect of the right foot extending proximally to the wound bed. Skin edges are well coapted with surgical sutures intact. No signs of dehiscence noted. No acute signs infection.     MUSCULOSKELETAL: Muscle strength is 4/5 for all pedal groups tested. No pain with palpation of the foot or ankle. Ankle joint ROM is decreased in dorsiflexion with the knee extended. History of TMA right foot, fourth ray and partial cuboid resection right foot. History of hallux amputation left foot, fifth ray resection left foot. IMAGING:  XR Left Foot (10/7)  Narrative   EXAM: XR FOOT LEFT (2 VIEWS)       INDICATION:  s/p 5th ray resection & cuboid bone biopsy; packed open       COMPARISON: 10/3/2021, 10/2/2021       FINDINGS:       There are postsurgical changes of fifth ray resection. There is a small amount of postsurgical subcutaneous gas. Redemonstration of prior amputation of the first digit at the level of the base of the first proximal phalanx again seen.       Impression   Expected postsurgical changes of left fifth ray resection. XR Right foot (10/13)  Narrative   History: Status post partial cuboid amputation. Right foot infection.       3 views right foot.       FINDINGS: There is a large defect along bilateral foot with wound VAC device. Extensive soft tissue swelling. Resection of much of the cuboid. No acute complication identified.       Impression   1. Partial amputation the region of the cuboid with soft tissue defect and extensive soft tissue swelling. No acute complication identified.          ASSESSMENT/PLAN  -s/p I&D, partial cuboid resection of right foot, I&D with [facility] of left foot (10/13/21)  -Full thickness ulceration; left foot; Carpenter 1  -PVD; b/l LE  -Edema; b/l LE  -Type 2 diabetes mellitus with peripheral neuropathy  -History of osteomyelitis; b/l LE  -History of noncompliance with weight bearing status    -Patient examined and evaluated at bedside   -Hypertensive, otherwise VSS, no leukocytosis noted (WBC 7.7)  -.1, [id number]  -Prealbumin (10/2/21)- 7.2, ordered dietary nutritional supplements to optimize wound healing potential  -HbA1c (10/1/21)- 8.3%  -Wound culture not obtained 2/2 no acute signs of infection  -Blood culture; NGTD  -Imaging reviewed, impression noted above  -Dressing applied to right LE consisting of adaptic to incision, saline moistened guaze to wound bed, dry sterile gauze, ABD, kerlix, ACE  -Tentative discharge to SNF today, will defer wound VAC application today, patient will have wound VAC applied at SNF for EBONY instructions  -Dressing to left LE left clean, dry, intact  -Continue IV antibiotics per the prior recommendations of Dr. Tyson Sears (vancomycin and meropenem through 11/26)  -Nonweight bearing to b/l LE  -Patient is OK to discharge from podiatric standpoint pending medical clearance. Patient will need IV antibiotics arranged at facility per prior recommendations of Dr. Tyson Sears. DISPO: s/p I&D with partial cuboid resection of right foot, I&D and DPC to left foot. Full thickness ulceration; left foot. Nonweight bearing to the b/l LE. Continue IV antibiotics per prior ID recommendations. Tentative discharge to SNF today, will defer wound VAC application in house, SNF to apply wound VAC. No surgical intervention from podiatric standpoint at this time. Will continue to follow while patient is in house.      Discussed assessment and plan with Dr. Sachin Keller DPM.    Franki Morgan DPM  Podiatric Resident, PGY-2  Pager #: [phone] or Perfect Serve

## 2021-10-21 NOTE — PROGRESS NOTES
Nephrology Note                                                                                                                                                                                                                                                                                                                                                               Office : [phone]     Fax :[phone]              Patient's Name: Nicolás Hernandes    10/21/2021    Reason for Consult:  Vol management   Requesting Physician:  Karel Black MD      Doing much better   Cr stable   On lasix     Past Medical History:   Diagnosis Date    Asthma 2004    Bacterial vaginosis 2008    Carpal tunnel syndrome 2007    [facility]     Diabetes mellitus type II 2007    10/1/20 pt states does accucheck 2x/day at home    Diabetic neuropathy ([address]     Diastolic CHF ([address]     DVT (deep venous thrombosis) ([facility] 2004    Dyslipidemia 2009    Dyspareunia 2009    ETOH abuse 2007    HTN (hypertension)     Hx of blood clots     Hyperlipidemia     MRSA (methicillin resistant staph aureus) culture positive 2017; 2017    foot; leg     Neuropathy 2009    polyneuropathy    Pancreatitis 2004    Tobacco abuse 2008    Uses wheelchair     also uses walker       Past Surgical History:   Procedure Laterality Date     SECTION  unknown    FOOT DEBRIDEMENT Right 2019    INCISION AND DRAINAGE WITH APPLICATION OF STRAVIX GRAFT RIGHT FOOT performed by Grayson Matthews DPM at [address] Right 2019    RIGHT FOOT INCISION AND DRAINAGE WITH STAGING TRANSMETATARSAL AMPUTATION performed by Grayson Matthews DPM at [address] Right 2019    RIGHT FOOT DEBRIDEMENT INCISION AND DRAINAGE, OPEN DIABETIC FOOT ULCER WITH GRAFT PLACEMENT performed by Grayson Matthews DPM at [address] [facility]  E DEBRIDEMENT Left 10/23/2019    LEFT FOOT INCISION AND DRAINAGE , DEBRIDEMENT OF OPEN WOUND, APPLICATION OF STRAVIX GRAFT performed by Ganga Amanda DPM at [address] Right 3/29/2021    INCISION AND DRAINAGE, DEBRIDEMENT OF DIABETIC WOUND WITH PLACEMENT OF STRAVIX GRAFT RIGHT FOOT performed by Ganga Amanda DPM at [address]  10/7/2021    BILATERAL LOWER EXTREMITY INCISION AND DRAINAGE, RIGHT FOOT 4TH RAY RESECTION, LEFT FOOT 5TH RAY RESECTION performed by Ganga Amanda DPM at [address] Bilateral 10/13/2021    REPEAT INCISION AND DRAINAGE OF ALL NONVIABLE SOFT TISSUE AND BONE/ PARTIAL CUBOID RESECTION/ BONY BIOPSY AS NEEDED/ WOUND VAC RIGHT LOWER EXTREMITY performed by Ganga Amanda DPM at [address] IR TUNNELED [address] 5 YEARS  10/5/2021    IR TUNNELED CATHETER PLACEMENT GREATER THAN 5 YEARS 10/5/2021 [facility] SPECIAL PROCEDURES    KNEE SURGERY Left     from falling off ladder -- has screws in place pt report    OTHER SURGICAL HISTORY Left 05/25/2016    I & D left foot    OTHER SURGICAL HISTORY Right 10/20/2017    RIGHT GASTROC LENGTHENING ENDOSCOPIC, INJECTION OF AMNI GRAFT    OTHER SURGICAL HISTORY Right 04/26/2018    Diabetic foot ulcer I&D w/ integra graft application    CA DEBRIDEMENT, SKIN, SUB-Q TISSUE,MUSCLE,BONE,=<20 SQ CM Right 8/17/2018    RIGHT FOOT DEBRIDEMENT INCISION AND DRAINAGE, PARTIAL 5TH RAY AMPUTATION performed by Ganga Amanda DPM at [address] PRE-MALIGNANT / [address]  3/4348    cryotherapy done on lesion    TOE AMPUTATION Left 02/24/2017    AMPUTATION LEFT GREAT TOE                 TONSILLECTOMY         History reviewed. No pertinent family history. reports that she quit smoking about 3 years ago. Her smoking use included cigarettes. She has a 30.00 pack-year smoking history.  She has never used smokeless tobacco. She reports that she does not drink alcohol and does not use 10/18/21  1156   INR 1.35*     UA:  Recent Labs     10/18/21  1209 10/18/21  1209 10/19/21  0508   COLORU Yellow  --   --    CLARITYU Clear  --   --    [address] Negative  --   --    BILIRUBINUR Negative  --   --    [address] Negative  --   --    SPECGRAV 1.020  --   --    BLOODU TRACE-INTACT*  --   --    PHUR 7.0   < > 6.0   PROTEINU 100*  --   --    UROBILINOGEN 0.2  --   --    NITRU Negative  --   --    LEUKOCYTESUR TRACE*  --   --    LABMICR YES  --   --    URINETYPE NotGiven  --   --     < > = values in this interval not displayed. Urine Microscopic:   Recent Labs     10/18/21  1209   BACTERIA 2+*   WBCUA 6-9*   RBCUA 11-20*   EPIU 21-50*     Urine Culture: No results for input(s): LABURIN in the last 72 hours. Urine Chemistry: No results for input(s): [place], PROTEINUR, NAUR in the last 72 hours. IMAGING:  XR CHEST PORTABLE   Final Result      1. Moderately severe diffuse bilateral airspace disease, new/increased from prior study. 2. Cardiomegaly. CT Head WO Contrast   Final Result      1. No acute intracranial hemorrhage or mass effect. Assessment/Plan   1. FAHEEM on CKD 3     2. HTN    3. Anemia    4. Acid- base/ Electrolyte imbalance     5. CHF     6.  AMS     Plan   - Pt has edema   - PO lasix   - edema better   - avoid sedatives    - Monitor UO and renal function   - labs in am   - cont abx   - monitor Vol status                 Thank you for allowing us to participate in care of William Aldana MD  Feel free to contact me   [facility] [address]  Office : [phone]  Fax :[phone]

## 2021-10-22 NOTE — TELEPHONE ENCOUNTER
Called all of the number for patient finally reached someone who stated she is in rehab will call back to see if patient is able to come to appt

## 2021-10-23 NOTE — ED TRIAGE NOTES
Patient reports to ED for AMS per family and increased bilateral LE swelling. Per family patient is \"not acting herself\". Per nursing home patient received meds, including daily methadone. Patient wanted dose of methadone split into two doses because it makes her \"feel too sleepy/off\", RN at nursing facility states she could not dose like this due to her orders. Patient took full dose of methadone and then family came to visit. RN at facility reports patient was not altered but tired after medication. On arrival patient is alert and oriented. +1 edema to BLE. On 4L O2 nasal cannual- EMS reports this is baseline. Patient c/o swelling in legs and right rib pain. Patient falling asleep during rest of triage and EKG.

## 2021-10-23 NOTE — ED PROVIDER NOTES
[address] [facility]          ATTENDING PHYSICIAN NOTE       Date of evaluation: 10/23/2021    Chief Complaint     Altered Mental Status (AMS per family. \"Not acting her normal self. \" Patient is alert and oriented. )      History of Present Illness     Shayne Jiménez is a 60 y.o. female with history of RASHMI, systolic CHF, bilateral lower foot wound status post partial amputations, osteomyelitis on vancomycin and meropenem, CKD, chronic pain on methadone, insulin-dependent diabetes, multiple other comorbidities presenting for altered mental status and volume overload. Patient is unable to provide any significant history. EMS was called after concerns were raised by her daughter for worsening volume overload and waxing and waning mental status. Patient was discharged recently to the nursing facility and has continued to put on fluid weight. Her daughter states she has swelling in her legs and abdomen. Her daughter also notes that the patient's mental status waxes and wanes, cannot identify any clear trigger. She has had 2 falls in the last 48 hours reportedly. Review of Systems     Unable to obtain given patient's mental status. Physical Exam     INITIAL VITALS: BP: (!) 147/63, Temp: 99.2 °F (37.3 °C), Pulse: 70, Resp: 18, SpO2: 100 %     Nursing note and vitals reviewed. General:  Adult female,  alert but confused. In no distress. HENT: Normocephalic and atraumatic. External ears normal. Nose appears normal externally. PERRL  Eyes: Conjunctivae normal. No scleral icterus. Neck: Neck supple. No tracheal deviation present. CV: Normal rate. Regular rhythm. S1/S2 auscultated. No murmurs, gallops or rubs. Tunneled catheter in right chest with dressing clean/dry/intact, mild skin desquamation at the dressing margins noted without surrounding erythema or induration. Pulm: Effort normal on NC. Faint diffuse rhonchi/crackles. GI: Soft. No distension. No tenderness.  No rebound or guarding. No masses. No peritoneal signs. Pitting edema of the abdomen noted. Musculoskeletal:  3+ bilateral lower extremity pitting edema. Bilateral feet with partial amputations. Wounds appear intact and without dehiscence. No drainage, dressings are dry. No edema. No gross deformities. Neurological:  Alert, oriented to name only. Intermittently becomes quite somnolent. Moving all extremities spontaneously. No facial asymmetry. Skin: Warm, dry. No rash. No diaphoresis or erythema. Procedures   Procedures    MEDICAL DECISION MAKING     MDM: Trina Cleveland is a 60 y.o. female with history as above presenting to the emergency department today for altered mental status and volume overload. On arrival, patient is alert, oriented to name only, vital signs notable for hypoxia on room air, is responsive to 4 L nasal cannula which is above her discharge baseline of what appears to be 2-3 L. She does have diffuse anasarca. Glucose is borderline low and she was placed on a D5 infusion. Suspect this is due to ongoing limited p.o. intake in the setting of her insulin regimen which was recently adjusted, but may require further adjustment. She does not have any signs of severe opiate toxidrome from her methadone. Doubt worsening infectious process that she is currently on vancomycin and meropenem. There was concern for injury from fall and CT head is without acute findings. No traumatic injuries on CT chest that she does have pulmonary findings concerning for possible pneumonia. While this may also represent pulmonary edema, Covid swab was sent out of concern for possible viral pneumonia. Doubt bacterial cause given her current broad-spectrum antibiotics. Patient remained hemodynamically stable in the ED. Discussed with the hospitalist who accepted for further care and management. Clinical Impression     1. Acute hypoxemic respiratory failure (HCC)    2.  Altered mental status, unspecified altered mental status type    3. Alejandra        Disposition     DISPOSITION Admitted 10/24/2021 12:13:36 AM        Elaina Garcia MD  3:03 AM                     Past Medical, Surgical, Family, and Social History     She has a past medical history of Asthma, Bacterial vaginosis, Carpal tunnel syndrome, COPD (chronic obstructive pulmonary disease) ([facility] Diabetes mellitus type II, Diabetic neuropathy ([facility] Diastolic CHF ([facility] DVT (deep venous thrombosis) ([facility] Dyslipidemia, Dyspareunia, ETOH abuse, HTN (hypertension), Hx of blood clots, Hyperlipidemia, MRSA (methicillin resistant staph aureus) culture positive, Neuropathy, Pancreatitis, Tobacco abuse, and Uses wheelchair. She has a past surgical history that includes  section (unknown); pre-malignant / benign skin lesion excision (7003); other surgical history (Left, 2016); Toe amputation (Left, 2017); other surgical history (Right, 10/20/2017); other surgical history (Right, 2018); pr debridement, skin, sub-q tissue,muscle,bone,=<20 sq cm (Right, 2018); Foot Debridement (Right, 2019); Foot Debridement (Right, 2019); Foot Debridement (Right, 2019); Foot Debridement (Left, 10/23/2019); Tonsillectomy; knee surgery (Left); Foot Debridement (Right, 3/29/2021); IR TUNNELED CVC PLACE WO SQ PORT/PUMP > 5 YEARS (10/5/2021); Foot Debridement (10/7/2021); and Foot Debridement (Bilateral, 10/13/2021). Her family history is not on file. She reports that she quit smoking about 3 years ago. Her smoking use included cigarettes. She has a 30.00 pack-year smoking history. She has never used smokeless tobacco. She reports that she does not drink alcohol and does not use drugs.     Medications     Current Discharge Medication List      CONTINUE these medications which have NOT CHANGED    Details   aspirin 81 MG EC tablet Take 81 mg by mouth daily      omeprazole (PRILOSEC) 20 MG delayed release capsule Take 20 mg by mouth daily      pantoprazole (PROTONIX) 40 MG tablet Take 1 tablet by mouth every morning (before breakfast)  Qty: 30 tablet, Refills: 3      insulin glargine (LANTUS SOLOSTAR) 100 UNIT/ML injection pen Inject 25 Units into the skin nightly  Qty: 5 pen, Refills: 0      ammonium lactate (LAC-HYDRIN) 12 % lotion Apply topically daily Apply to both legs daily      insulin aspart (NOVOLOG FLEXPEN) 100 UNIT/ML injection pen Inject 8 Units into the skin 3 times daily (before meals)      vancomycin (VANCOCIN) 750 MG injection Infuse 750 mg intravenously daily      meropenem (MERREM) 1 g injection Inject 1,000 mg into the muscle every 12 hours      hydrALAZINE (APRESOLINE) 50 MG tablet Take 1 tablet by mouth every 8 hours  Qty: 90 tablet, Refills: 3      metoprolol succinate (TOPROL XL) 50 MG extended release tablet Take 1 tablet by mouth daily  Qty: 30 tablet, Refills: 3      furosemide (LASIX) 40 MG tablet Take 1 tablet by mouth daily  Qty: 30 tablet, Refills: 0    Associated Diagnoses: Chronic heart failure with preserved ejection fraction ([facility] Chronic systolic heart failure (HCC)      gabapentin (NEURONTIN) 400 MG capsule Take 1 capsule by mouth 2 times daily for 90 days. Qty: 180 capsule, Refills: 0    Associated Diagnoses: Diabetic polyneuropathy associated with type 2 diabetes mellitus ([facility] DM type 2, uncontrolled, with lower extremity ulcer (HCC)      ELIQUIS 5 MG TABS tablet TAKE 1 TABLET BY MOUTH TWICE DAILY  Qty: 180 tablet, Refills: 1    Associated Diagnoses: Deep vein thrombosis (DVT) of proximal lower extremity, unspecified chronicity, unspecified laterality (HCC)      escitalopram (LEXAPRO) 10 MG tablet Take 1 tablet by mouth daily  Qty: 30 tablet, Refills: 2    Associated Diagnoses: Depression, unspecified depression type      methadone (DOLOPHINE) 10 MG/ML solution Take 140 mg by mouth daily.  Verified dose with [facility] ([phone]) 6/18/21      amitriptyline (ELAVIL) 100 MG tablet TAKE 1 TABLET BY MOUTH EVERY NIGHT AT BEDTIME  Qty: 30 tablet, Refills: 2    Associated Diagnoses: Type 2 diabetes mellitus with diabetic polyneuropathy, with long-term current use of insulin ([address] Diabetic polyneuropathy associated with type 2 diabetes mellitus (HCC)      atorvastatin (LIPITOR) 20 MG tablet Take 1 tablet by mouth nightly  Qty: 90 tablet, Refills: 1    Associated Diagnoses: Dyslipidemia      nystatin (MYCOSTATIN) 049904 UNIT/GM powder Apply topically 2 times daily Apply to right breast and groin area BID  Qty: 30 g, Refills: 3    Associated Diagnoses: Diabetic foot infection (HCC)      blood glucose test strips (TRUE METRIX BLOOD GLUCOSE TEST) strip 1 each by In Vitro route 2 times daily As needed. Qty: 200 each, Refills: 5    Associated Diagnoses: DM type 2, uncontrolled, with lower extremity ulcer (HCC)      Insulin Pen Needle 31G X 5 MM MISC 1 each by Does not apply route daily  Qty: 100 each, Refills: 5    Associated Diagnoses: DM type 2, uncontrolled, with lower extremity ulcer ([address]      Lancets MISC 1 each by Does not apply route 2 times daily PHARMACY MAY SUBSTITUTE TO TRUE METRIX LANCETS  Qty: 100 each, Refills: 5    Associated Diagnoses: DM type 2, uncontrolled, with lower extremity ulcer (HCC)      Gauze Pads & Dressings MISC Please dispense 4x8 guaze, kerlix, and ace  Qty: 1 each, Refills: 2             Allergies     She is allergic to sulfa antibiotics. ED Course     Nursing Notes, Past Medical Hx, Past Surgical Hx, Social Hx,Allergies, and Family Hx were reviewed.     Patient was given the following medications:  Orders Placed This Encounter   Medications    DISCONTD: dextrose 5 % in lactated ringers infusion    furosemide (LASIX) injection 40 mg    sodium chloride flush 0.9 % injection 5-40 mL    sodium chloride flush 0.9 % injection 5-40 mL    0.9 % sodium chloride infusion    OR Linked Order Group     ondansetron (ZOFRAN-ODT) disintegrating tablet 4 mg     ondansetron (ZOFRAN) injection 4 mg    polyethylene glycol (GLYCOLAX) packet 17 g    OR Linked Order Group     acetaminophen (TYLENOL) tablet 650 mg     acetaminophen (TYLENOL) suppository 650 mg    glucose (GLUTOSE) 40 % oral gel 15 g    dextrose 50 % IV solution    glucagon (rDNA) injection 1 mg    dextrose 5 % solution    insulin lispro (1 Unit Dial) 0-6 Units    0.9 % sodium chloride infusion    amitriptyline (ELAVIL) tablet 100 mg    aspirin EC tablet 81 mg    atorvastatin (LIPITOR) tablet 20 mg    apixaban (ELIQUIS) tablet 5 mg    escitalopram (LEXAPRO) tablet 10 mg    furosemide (LASIX) tablet 40 mg    gabapentin (NEURONTIN) capsule 400 mg    DISCONTD: meropenem (MERREM) injection 1,000 mg     Order Specific Question:   Antimicrobial Indications     Answer:   Bone and Joint Infection    miconazole (MICOTIN) 2 % powder     Refill:  3    pantoprazole (PROTONIX) tablet 40 mg    meropenem (MERREM) 1,000 mg in sodium chloride 0.9 % 100 mL IVPB (mini-bag)     Order Specific Question:   Antimicrobial Indications     Answer:   Skin and Soft Tissue Infection     Order Specific Question:   Antimicrobial Indications     Answer:   Bone and Joint Infection    vancomycin (VANCOCIN) 750 mg in dextrose 5 % 250 mL IVPB     Order Specific Question:   Antimicrobial Indications     Answer:   Bone and Joint Infection     Order Specific Question:   Antimicrobial Indications     Answer:   Skin and Soft Tissue Infection       Diagnostic Results         RECENT VITALS:  BP: (!) 158/66,Temp: 98 °F (36.7 °C), Pulse: 68, Resp: 16, SpO2: 93 %     RADIOLOGY:  CT CHEST WO CONTRAST   Final Result   1. No acute intracranial findings. CHEST:         FINDINGS:      Patchy bilateral groundglass opacity and airspace disease in both upper lobes and to a lesser extent in both lower lobes consistent with pneumonia. Small right pleural effusion. No significant left pleural effusion. Normal heart size. No pericardial    effusion.  Vascular aorta and main pulmonary artery are normal in caliber. Right IJ venous catheter tip at the cavoatrial junction. There is diffuse anasarca. Small gallstones are noted in the gallbladder. There is a calcification in the tail the    pancreas measuring 1.1 cm which is stable since previous abdominal CT from June 2021. There is a cystic area in the tail the pancreas measuring 2.4 x 1.2 cm which is also unchanged. On previous chest CT from 2011, the cystic area in the tail the pancreas    measured 2.8 x 1.7 cm. No fractures are identified. IMPRESSION:   1. Moderate patchy bilateral airspace disease consistent with multifocal pneumonia including potential atypical viral etiologies. Clinical correlation recommended. 2. Trace right pleural effusion. 3. Anasarca. CT Head WO Contrast   Final Result   1. No acute intracranial findings. CHEST:         FINDINGS:      Patchy bilateral groundglass opacity and airspace disease in both upper lobes and to a lesser extent in both lower lobes consistent with pneumonia. Small right pleural effusion. No significant left pleural effusion. Normal heart size. No pericardial    effusion. Vascular aorta and main pulmonary artery are normal in caliber. Right IJ venous catheter tip at the cavoatrial junction. There is diffuse anasarca. Small gallstones are noted in the gallbladder. There is a calcification in the tail the    pancreas measuring 1.1 cm which is stable since previous abdominal CT from June 2021. There is a cystic area in the tail the pancreas measuring 2.4 x 1.2 cm which is also unchanged. On previous chest CT from 2011, the cystic area in the tail the pancreas    measured 2.8 x 1.7 cm. No fractures are identified. IMPRESSION:   1. Moderate patchy bilateral airspace disease consistent with multifocal pneumonia including potential atypical viral etiologies. Clinical correlation recommended. 2. Trace right pleural effusion. 3. Anasarca.             XR CHEST PORTABLE   Final Result   1. Stable diffuse bilateral airspace disease.              LABS:   Results for orders placed or performed during the hospital encounter of 10/23/21   Rapid influenza A/B antigens    Specimen: Nasopharyngeal   Result Value Ref Range    Rapid Influenza A Ag Negative Negative    Rapid Influenza B Ag Negative Negative   CBC Auto Differential   Result Value Ref Range    WBC 9.2 4.0 - 11.0 K/uL    RBC 2.74 (L) 4.00 - 5.20 M/uL    Hemoglobin 8.0 (L) 12.0 - 16.0 g/dL    Hematocrit 23.7 (L) 36.0 - 48.0 %    MCV 86.5 80.0 - 100.0 fL    MCH 29.2 26.0 - 34.0 pg    MCHC 33.7 31.0 - 36.0 g/dL    RDW 17.6 (H) 12.4 - 15.4 %    Platelets 778 861 - 296 K/uL    MPV 7.7 5.0 - 10.5 fL    Neutrophils % 70.0 %    Lymphocytes % 15.9 %    Monocytes % 8.6 %    Eosinophils % 4.5 %    Basophils % 1.0 %    Neutrophils Absolute 6.4 1.7 - 7.7 K/uL    Lymphocytes Absolute 1.5 1.0 - 5.1 K/uL    Monocytes Absolute 0.8 0.0 - 1.3 K/uL    Eosinophils Absolute 0.4 0.0 - 0.6 K/uL    Basophils Absolute 0.1 0.0 - 0.2 K/uL   Comprehensive Metabolic Panel w/ Reflex to MG   Result Value Ref Range    Sodium 137 136 - 145 mmol/L    Potassium reflex Magnesium 5.2 (H) 3.5 - 5.1 mmol/L    Chloride 101 99 - 110 mmol/L    CO2 28 21 - 32 mmol/L    Anion Gap 8 3 - 16    Glucose 65 (L) 70 - 99 mg/dL    BUN 49 (H) 7 - 20 mg/dL    CREATININE 1.9 (H) 0.6 - 1.1 mg/dL    GFR Non-African American 27 (A) >60    GFR  33 (A) >60    Calcium 8.4 8.3 - 10.6 mg/dL    Total Protein 6.3 (L) 6.4 - 8.2 g/dL    Albumin 2.3 (L) 3.4 - 5.0 g/dL    Albumin/Globulin Ratio 0.6 (L) 1.1 - 2.2    Total Bilirubin <0.2 0.0 - 1.0 mg/dL    Alkaline Phosphatase 208 (H) 40 - 129 U/L    ALT 21 10 - 40 U/L    AST 22 15 - 37 U/L    Globulin 4.0 Not Established g/dL   Lactic Acid, Plasma   Result Value Ref Range    Lactic Acid 0.8 0.4 - 2.0 mmol/L   Blood Gas, Venous   Result Value Ref Range    pH, Brody 7.403 7.350 - 7.450    pCO2, Brody 51.1 (H) 41.0 - 51.0 mmHg    pO2, Brody 161.0 (H) 25 - 40 mmHg    HCO3, Venous 31.9 (H) 24.0 - 28.0 mmol/L    Base Excess, Brody 6.3 (H) -2.0 - 3.0 mmol/L    O2 Sat, Brody >100 Not established %    Carboxyhemoglobin 2.0 (H) 0.0 - 1.5 %    MetHgb, Brody 0.3 0.0 - 1.5 %    TC02 (Calc), Brody 34 mmol/L   Urinalysis Reflex to Culture    Specimen: Urine, clean catch   Result Value Ref Range    Color, UA Yellow Straw/Yellow    Clarity, UA Clear Clear    Glucose, Ur 100 (A) Negative mg/dL    Bilirubin Urine Negative Negative    Ketones, Urine Negative Negative mg/dL    Specific Gravity, UA 1.020 1.005 - 1.030    Blood, Urine Negative Negative    pH, UA 6.0 5.0 - 8.0    Protein,  (A) Negative mg/dL    Urobilinogen, Urine 0.2 <2.0 E.U./dL    Nitrite, Urine Negative Negative    Leukocyte Esterase, Urine Negative Negative    Microscopic Examination YES     Urine Type NotGiven     Urine Reflex to Culture Not Indicated    Troponin   Result Value Ref Range    Troponin 0.05 (H) <0.01 ng/mL   Brain Natriuretic Peptide   Result Value Ref Range    Pro-BNP 7,499 (H) 0 - 124 pg/mL   Protime-INR   Result Value Ref Range    Protime 15.3 (H) 9.9 - 12.7 sec    INR 1.34 (H) 0.88 - 1.12   Microscopic Urinalysis   Result Value Ref Range    WBC, UA None seen 0 - 5 /HPF    RBC, UA 0-2 0 - 4 /HPF    Bacteria, UA Rare (A) None Seen /HPF   POCT Glucose   Result Value Ref Range    POC Glucose 69 (L) 70 - 99 mg/dl    Performed on ACCU-CHEK    POCT Glucose   Result Value Ref Range    POC Glucose 143 (H) 70 - 99 mg/dl    Performed on ACCU-CHEK        CONSULTS:  IP CONSULT TO HOSPITALIST  PHARMACY TO DOSE VANCOMYCIN  IP CONSULT TO RESPIRATORY CARE  IP CONSULT TO CASE MANAGEMENT    PATIENT REFERRED TO:  No follow-up provider specified.     DISCHARGE MEDICATIONS:  Current Discharge Medication List           Brittany Rehman MD  10/24/21 3403

## 2021-10-24 PROBLEM — G93.40 ENCEPHALOPATHY ACUTE: Status: ACTIVE | Noted: 2021-01-01

## 2021-10-24 NOTE — PROGRESS NOTES
Resident contacted d/t patient more alert and requesting water. MD stated it was okay to give water and he would change the order.

## 2021-10-24 NOTE — PROGRESS NOTES
proximity to the dose administered. This is not necessarily an indicator of toxicity. Cultures & Sensitivities:    Date Site Micro Susceptibility / Result   10/23 Flu Swab Negative            Labs / Ancillary Data:    Estimated Creatinine Clearance: 36 mL/min (A) (based on SCr of 1.8 mg/dL (H)).     Recent Labs     10/23/21  1938 10/24/21  0346   CREATININE 1.9* 1.8*   BUN 49* 48*   WBC 9.2  --

## 2021-10-24 NOTE — CARE COORDINATION
Sw Following, met w/Pt's dtr, she asked that SW submit referrals to a new SNF and not send the Pt back to [facility], SW discussed referring to locations close to Pt's home, SW review options w/dtr and submitted referrals    1. [facility]    2. [facility]: need to fax packet    3. [address]     4.  [facility] [place], [facility].     Electronically signed by SAURABH Sanchez, RICHELLEW on 10/24/2021 at 1:59 PM   [phone]

## 2021-10-24 NOTE — PROGRESS NOTES
Clinical Pharmacy Progress Note    Vancomycin - Management by Pharmacy    Consult Date(s): 10/24/21  Consulting Provider(s): Lobo    Assessment / Plan    Hx of bilateral foot infection / osteomyelitis - Vancomycin   Concurrent Antimicrobials: meropenem   Day of Vanc Therapy: has been receiving as outpatient, therapy intended to complete 10/26   Current Dosing Method: AUC   Therapeutic Goal: -600 mg/L*hr   Current Dose / Frequency: 750 mg every 24 hours   Plan / Rationale: continuation of therapy   Will continue to monitor clinical condition and make adjustments to regimen as appropriate. Thank you for consulting Pharmacy! Jorge Hall, MargothD, [facility] 10/24/2021 1:59 AM        Interval update: 10/24/21    Subjective/Objective: Ms. Tay Baum is a 60 y.o. female with a PMHx significant for obesity, RASHMI, HFrEF, bilateral foot wounds s/p partial amputation, CKD, chronic pain, DM who presented with AMS. Pt was previously discharged on vancomycin and meropenem with recommendations from ID to continue through 11/26. Patient admitted for work-up of altered mental status. Height:   Ht Readings from Last 1 Encounters:   10/18/21 5' (1.524 m)     Weight:   Wt Readings from Last 1 Encounters:   10/20/21 223 lb 1.7 oz (101.2 kg)       Level(s) / Doses:    Date Time Dose Level / Type of Level Interpretation                 Note: Serum levels collected for AUC-based dosing may be high if collected in close proximity to the dose administered. This is not necessarily an indicator of toxicity. Cultures & Sensitivities:    Date Site Micro Susceptibility / Result                 Labs / Ancillary Data:    Estimated Creatinine Clearance: 35 mL/min (A) (based on SCr of 1.9 mg/dL (H)).     Recent Labs     10/23/21  1938   CREATININE 1.9*   BUN 49*   WBC 9.2

## 2021-10-24 NOTE — H&P
Internal Medicine  PGY 1  History & Physical      CC AMS    History Obtained From:  patient    HISTORY OF PRESENT ILLNESS:    Gaby Saunders is a 59 y/o F w/ hx if RASHMI, COPD, systolic HF, DVT (0834), bilateral lower foot wounds w/ partial amputations, osteomyelitis on vancomycin and meropenem, CKD, DM who presents w/ AMS. EMS was called by the patient's daughters due to concern about waxing and waning mental status. The patient was recently discharged on 10/16 during which she was diagnosed w/ osteomyelitis of feet and given would care, multiple I&D, and antibiotic therapy. She was then discharged to a skilled nursing facility. She was readmitted on 10/18 due to being found unresponsive after receiving 140 mg of methadone in the morning and she was also found to have Bg of 30 and was given glucagon at that time. Patient was started on D50 and IVF and podiatry performed an I&D w/ partial cuboid resection. The patient was discharged on 10/20 after mental status returned to A&OX4. Tonight, in the emergency department the patient was alert to name only. The patient was hypoxic on room air and was responsive to 4L nasal cannula. Per EMR the patient's baseline is 2-3 liters. The patient's blood glucose was found to be 65, K was 5.2 (no EKG changes), and Creatine was 1.9 (baseline 1.4-1.7). The patient was admitted to the medicine floor for further workup and care.         Past Medical History:        Diagnosis Date    Asthma 05/14/2004    Bacterial vaginosis 04/2008    Carpal tunnel syndrome 05/2007    COPD (chronic obstructive pulmonary disease) ([address]     Diabetes mellitus type II 08/2007    10/1/20 pt states does accucheck 2x/day at home    Diabetic neuropathy ([address] 95/1252    Diastolic CHF ([address]     DVT (deep venous thrombosis) ([address] 03/2004    Dyslipidemia 05/2009    Dyspareunia 05/2009    ETOH abuse 03/04/2007    HTN (hypertension)     Hx of blood clots     Hyperlipidemia     MRSA (methicillin resistant staph aureus) culture positive 2017; 2017    foot; leg     Neuropathy 2009    polyneuropathy    Pancreatitis 2004    Tobacco abuse 2008    Uses wheelchair     also uses walker   ·     Past Surgical History:        Procedure Laterality Date     SECTION  unknown    FOOT DEBRIDEMENT Right 2019    INCISION AND DRAINAGE WITH APPLICATION OF STRAVIX GRAFT RIGHT FOOT performed by Jason Bronson DPM at [address] Right 2019    RIGHT FOOT INCISION AND DRAINAGE WITH STAGING TRANSMETATARSAL AMPUTATION performed by Jason Bronson DPM at [facility] Right 2019    RIGHT FOOT DEBRIDEMENT INCISION AND DRAINAGE, OPEN DIABETIC FOOT ULCER WITH GRAFT PLACEMENT performed by Jason Bronson DPM at [address] Left 10/23/2019    LEFT FOOT INCISION AND DRAINAGE , DEBRIDEMENT OF OPEN WOUND, APPLICATION OF STRAVIX GRAFT performed by Jason Bronson DPM at [facility] Right 3/29/2021    INCISION AND DRAINAGE, DEBRIDEMENT OF DIABETIC WOUND WITH PLACEMENT OF STRAVIX GRAFT RIGHT FOOT performed by Jason Bronson DPM at [address]  10/7/2021    BILATERAL LOWER EXTREMITY INCISION AND DRAINAGE, RIGHT FOOT 4TH RAY RESECTION, LEFT FOOT 5TH RAY RESECTION performed by Jason Bronson DPM at [address] Bilateral 10/13/2021    REPEAT INCISION AND DRAINAGE OF ALL NONVIABLE SOFT TISSUE AND BONE/ PARTIAL CUBOID RESECTION/ BONY BIOPSY AS NEEDED/ WOUND VAC RIGHT LOWER EXTREMITY performed by Jason Bronson DPM at [address] IR TUNNELED [address] 5 YEARS  10/5/2021    IR TUNNELED CATHETER PLACEMENT GREATER THAN 5 YEARS 10/5/2021 [address] SPECIAL PROCEDURES    KNEE SURGERY Left     from falling off ladder -- has screws in place pt report    OTHER SURGICAL HISTORY Left 2016    I & D left foot    OTHER SURGICAL HISTORY Right 10/20/2017    RIGHT GASTROC LENGTHENING ENDOSCOPIC, INJECTION OF AMNI GRAFT    OTHER SURGICAL HISTORY Right 04/26/2018    Diabetic foot ulcer I&D w/ integra graft application    UT DEBRIDEMENT, SKIN, SUB-Q TISSUE,MUSCLE,BONE,=<20 SQ CM Right 8/17/2018    RIGHT FOOT DEBRIDEMENT INCISION AND DRAINAGE, PARTIAL 5TH RAY AMPUTATION performed by Georgie Mcardle, DPM at [address] PRE-MALIGNANT / [address]  7/7003    cryotherapy done on lesion    TOE AMPUTATION Left 02/24/2017    AMPUTATION LEFT GREAT TOE                 TONSILLECTOMY     ·     Medications Priorto Admission:    · Medications Prior to Admission: aspirin 81 MG EC tablet, Take 81 mg by mouth daily  · omeprazole (PRILOSEC) 20 MG delayed release capsule, Take 20 mg by mouth daily  · pantoprazole (PROTONIX) 40 MG tablet, Take 1 tablet by mouth every morning (before breakfast)  · insulin glargine (LANTUS SOLOSTAR) 100 UNIT/ML injection pen, Inject 25 Units into the skin nightly  · ammonium lactate (LAC-HYDRIN) 12 % lotion, Apply topically daily Apply to both legs daily  · insulin aspart (NOVOLOG FLEXPEN) 100 UNIT/ML injection pen, Inject 8 Units into the skin 3 times daily (before meals)  · vancomycin (VANCOCIN) 750 MG injection, Infuse 750 mg intravenously daily  · meropenem (MERREM) 1 g injection, Inject 1,000 mg into the muscle every 12 hours  · hydrALAZINE (APRESOLINE) 50 MG tablet, Take 1 tablet by mouth every 8 hours  · metoprolol succinate (TOPROL XL) 50 MG extended release tablet, Take 1 tablet by mouth daily  · furosemide (LASIX) 40 MG tablet, Take 1 tablet by mouth daily  · gabapentin (NEURONTIN) 400 MG capsule, Take 1 capsule by mouth 2 times daily for 90 days. · ELIQUIS 5 MG TABS tablet, TAKE 1 TABLET BY MOUTH TWICE DAILY  · escitalopram (LEXAPRO) 10 MG tablet, Take 1 tablet by mouth daily  · methadone (DOLOPHINE) 10 MG/ML solution, Take 140 mg by mouth daily.  Verified dose with Debbie  [address] [phone]) 6/18/21  · amitriptyline (ELAVIL) 100 MG tablet, TAKE 1 TABLET BY MOUTH EVERY NIGHT AT BEDTIME  · atorvastatin (LIPITOR) 20 MG tablet, Take 1 tablet by mouth nightly  · nystatin (MYCOSTATIN) 658249 UNIT/GM powder, Apply topically 2 times daily Apply to right breast and groin area BID  · blood glucose test strips (TRUE METRIX BLOOD GLUCOSE TEST) strip, 1 each by In Vitro route 2 times daily As needed. · Insulin Pen Needle 31G X 5 MM MISC, 1 each by Does not apply route daily  · Lancets MISC, 1 each by Does not apply route 2 times daily PHARMACY MAY SUBSTITUTE TO TRUE METRIX LANCETS  · Gauze Pads & Dressings MISC, Please dispense 4x8 guaze, kerlix, and ace    Allergies:  Sulfa antibiotics    Social History:   · TOBACCO:   reports that she quit smoking about 3 years ago. Her smoking use included cigarettes. She has a 30.00 pack-year smoking history. She has never used smokeless tobacco.  · ETOH:   reports no history of alcohol use. · DRUGS : Denies current illicit drug use  · Patient currently lives in SNF for rehabilitation after most recent hospitalization  ·   Family History:   · History reviewed. No pertinent family history. ROS: A 10 point review of systems was conducted, significant findings as noted in HPI. Physical Exam  Constitutional: Alert, normal appearance, no acute distress  HENT: head normocephalic/atraumatic, no congestion or rhinorrhea, PERRLA, EOM intact  Cardio: normal rate, regular rhythm. No murmurs heard on auscultation  Pulmonary: clear to auscultation bilaterally, no wheezes, rales, or stridor  GI: abdomen soft, flat, and non-distended. No tenderness or guarding. Patient complains   Musculoskeletal: no deformity, tenderness, or injury. No lower extremity edema. Skin: no lesion, rash, or erythema. Lower extremity post-surgical changes, ulcers. Dressing changed day of admission. Did not remove dressing, no discharge or drainage noticed. Neuro: Patient was somnolent and difficult to arouse.  Patient alert and oriented to person, place, time, and situation. Psych: mood normal, behavior normal     Vitals:    10/24/21 0202   BP: (!) 158/66   Pulse: 68   Resp:    Temp:    SpO2:        DATA:    Labs:  CBC:   Recent Labs     10/23/21  1938   WBC 9.2   HGB 8.0*   HCT 23.7*          BMP:   Recent Labs     10/23/21  1938 10/24/21  0346 10/24/21  0347    136  --    K 5.2* 5.1 5.2*    102  --    CO2 28 26  --    BUN 49* 48*  --    CREATININE 1.9* 1.8*  --    GLUCOSE 65* 186*  --      LFT's:   Recent Labs     10/23/21  1938   AST 22   ALT 21   BILITOT <0.2   ALKPHOS 208*     Troponin:   Recent Labs     10/23/21  1938 10/24/21  0347   TROPONINI 0.05* 0.04*     BNP:No results for input(s): BNP in the last 72 hours. ABGs: No results for input(s): PHART, YWQ0VTV, PO2ART in the last 72 hours. INR:   Recent Labs     10/23/21  2003   INR 1.34*       U/A:  Recent Labs     10/23/21  2001   COLORU Yellow   PHUR 6.0   WBCUA None seen   RBCUA 0-2   BACTERIA Rare*   CLARITYU Clear   SPECGRAV 1.020   LEUKOCYTESUR Negative   UROBILINOGEN 0.2   BILIRUBINUR Negative   BLOODU Negative   GLUCOSEU 100*       CT CHEST WO CONTRAST   Final Result   1. No acute intracranial findings. CHEST:         FINDINGS:      Patchy bilateral groundglass opacity and airspace disease in both upper lobes and to a lesser extent in both lower lobes consistent with pneumonia. Small right pleural effusion. No significant left pleural effusion. Normal heart size. No pericardial    effusion. Vascular aorta and main pulmonary artery are normal in caliber. Right IJ venous catheter tip at the cavoatrial junction. There is diffuse anasarca. Small gallstones are noted in the gallbladder. There is a calcification in the tail the    pancreas measuring 1.1 cm which is stable since previous abdominal CT from June 2021. There is a cystic area in the tail the pancreas measuring 2.4 x 1.2 cm which is also unchanged.  On previous chest CT from 2011, the cystic area in the tail the pancreas to her name  -On the floor patient was alert and oriented X4 but was somnolent. Arousalable to verbal stimuli  -Suspected secondary to hypoglycemia or methadone   -CKD4 may cause decreased renal clearance of methadone  -Patient has previous hospitalization 4 days ago for similar presentation  -Will give half dose of lantus and started diet, follow Bg during the day    #Acute hypoxic respiratory failrue  -Patient has hx of COPD (30 pack year smoking hx)  -Baseline oxygen 2-3L  -Patient was hypoxic on RA, responsive to 4L on nasal cannula  -Cont home nebulizers, patient received 40 mg furosemide in ED    #Patchy Bilateral airspace disease  -Unlikely infectious due to continuous treatment w/ Vanc / and Merropenem  -Possibly secondary to fluid overload  -Echo 2018 had EF of 50-55%  -Received diuretics in ED, monitor Ins and outs    #Osteomyelitis  -Cont Vanc and Meropenem  -Patient's dressings were dry, no drainage, patient reports previously changed today. #Elevated Troponin  -0.05 -> 0.04  -Could be secondary to dehydration    #Hx DVT  -Cont eliquis    Will discuss with attending physician Dr. Ronan Daily Status:Full code  FEN: Diet NPO  PPX: home eliquis  DISPO: SHEMAR Frederick MD  10/24/2021,  5:26 AM      I saw the patient independently from the resident . I discussed the care with the resident. I personally reviewed the HPI, PH, FH, SH, ROS and medications. I repeated pertinent portions of the examination and reviewed the relevant imaging and laboratory data. I agree with the findings, assessment and plan as documented.

## 2021-10-24 NOTE — FLOWSHEET NOTE
Patient is alert and oriented x4 but goes in and out of consciousness. Patient appears very lethargic and sleepy at times and is hard to waken. Will continue to monitor. Resident aware and baseline of patient is unknown.        10/24/21 0800   Neurological   Orientation Level Oriented X4

## 2021-10-24 NOTE — PROGRESS NOTES
Speech Language Pathology  Facility/Department: [facility]  [address] SWALLOW EVALUATION    NAME: Heidy Martínez  : 1965  MRN: [id number]    ADMISSION DATE: 10/23/2021  ADMITTING DIAGNOSIS: has Feet clawing; Diabetic neuropathy ([address] Hyperlipidemia; HTN (hypertension); Amenorrhea; Dyspareunia; Neuropathy; Bacterial vaginosis; Low back pain; Anomalies of nails; Tobacco abuse; Carpal tunnel syndrome; Scalp lesion; ETOH abuse; Pseudocyst, pancreas; Asthma; Nicotine addiction; Hypoxia; Onychomycosis; Depression; Anxiety state; Tinea pedis; Burn; Obesity (BMI 30-39.9); S/P foot surgery, right; Open wound of left foot with complication; Cellulitis; Bilateral lower extremity edema; Chronic multifocal osteomyelitis of left foot ([address] Acute systolic congestive heart failure ([address] Acute osteomyelitis of metatarsal bone of right foot ([address] Bronchitis; Cellulitis and abscess of foot; Group B streptococcal infection; Tendonitis, Achilles, right; Diabetic ulcer of right midfoot associated with type 2 diabetes mellitus, limited to breakdown of skin ([address] Opiate dependence ([address] Class 2 obesity due to excess calories with body mass index (BMI) of 37.0 to 37.9 in adult; CKD (chronic kidney disease), stage III ([address] Diabetic foot infection ([address] Chronic osteomyelitis of right foot with draining sinus ([address] Acute kidney injury superimposed on CKD ([address] Acute blood loss anemia; Aspiration pneumonitis ([address] Altered mental status; Chronic systolic heart failure ([address] Gastroesophageal reflux disease; Type 2 diabetes mellitus with right diabetic foot infection ([address] Systolic CHF, acute ([address] Type 2 diabetes mellitus with left diabetic foot infection ([address] Lymphedema of left leg; Lymphedema of right lower extremity; Charcot's joint of ankle; Elevated sed rate; Elevated C-reactive protein (CRP); History of transmetatarsal amputation of right foot ([address] History of alcoholism ([facility] Ex-smoker;  History of MRSA infection; CKD (chronic kidney disease) stage 4, GFR 15-29 ml/min ([facility]); Acute on chronic congestive heart failure ([address] Acute encephalopathy; and Encephalopathy acute on their problem list.  ONSET DATE: 10/23/21    CT Chest (10/23):  1. Moderate patchy bilateral airspace disease consistent with multifocal pneumonia including potential atypical viral etiologies. Clinical correlation recommended. 2. Trace right pleural effusion. 3. Anasarca. Date of Eval: 10/24/2021  Evaluating Therapist: ALEXANDRA Arias    Current Diet level:  Current Diet : Regular  Current Liquid Diet : Thin      Primary Complaint  Patient Complaint: Asking for methadone    Pain:  Pain Assessment  Pain Assessment: 0-10  Pain Level: 0  Patient's Stated Pain Goal: No pain  Pain Type: Acute pain  Pain Location: Rib cage  Pain Orientation: Right  Pain Descriptors: Aching  Pain Frequency: Continuous    Reason for Referral  Tay Baum was referred for a bedside swallow evaluation to assess the efficiency of her swallow function, identify signs and symptoms of aspiration and make recommendations regarding safe dietary consistencies, effective compensatory strategies, and safe eating environment. Impression  Dysphagia Diagnosis: Suspected needs further assessment  Dysphagia Impression: Patient presents with increased risk of oropharyngeal dysphagia secondary to cognitive status and new O2 requirement. Current chest imaging is concerning for aspiration. Pt reports dysphagia, specifically globus sensation with solids. She is unable to further describe dysphagia, complicated by cognitive status and significantly delayed cognitive processing. Pt exhibits no s/s aspiration with thin liquids, soft solid risotto or hard solid chicken from lunch tray. She exhibits prolonged mastication with solids, however displays complete oral clearance. Recommend continue Regular diet with thin liquids and obtain MBS.  A MBS is warranted to further status returned to A&OX4. Tonight, in the emergency department the patient was alert to name only. The patient was hypoxic on room air and was responsive to 4L nasal cannula. Per EMR the patient's baseline is 2-3 liters. The patient's blood glucose was found to be 65, K was 5.2 (no EKG changes), and Creatine was 1.9 (baseline 1.4-1.7). The patient was admitted to the medicine floor for further workup and care. Subjective  Subjective: Difficult to awaken, AMS, delayed processing  Behavior/Cognition: Confused; Lethargic  Respiratory Status: O2 via nasual cannula  O2 Device: Nasal cannula  Communication Observation: Functional  Follows Directions: Simple  Dentition: Some missing teeth  Patient Positioning: Upright in bed  Baseline Vocal Quality: Hoarse  Prior Dysphagia History: Pt reports globus sensation with solids, unable to state further  Consistencies Administered: Thin;Dysphagia Minced and Moist (Dysphagia II); Reg solid      Pain Level: 0    Vision/Hearing  Vision  Vision: Within Functional Limits  Hearing  Hearing: Within functional limits    Oral Motor Deficits  Oral/Motor  Oral Motor: Within functional limits    Oral Phase Dysfunction  Oral Phase  Oral Phase: Exceptions  Oral Phase Dysfunction  Impaired Mastication: Reg Solid; however demos complete oral clearance     Indicators of Pharyngeal Phase Dysfunction   Pharyngeal Phase  Pharyngeal Phase: Exceptions  Pharyngeal Phase   Pharyngeal: Globus sensation with solids    Prognosis  Prognosis  Prognosis for safe diet advancement: good  Individuals consulted  Consulted and agree with results and recommendations: Patient    Education  Patient Education: Education provided re: role of SLP, dysphagia, basic swallow anatomy & physiology, s/s aspiration, risks of aspiration, recommended diet/liquid consistencies, and swallow strategies to reduce risk of aspiration.    Patient Education Response: Verbalizes understanding  Safety Devices in place: Yes  Type of devices: All fall risk precautions in place       Therapy Time  SLP Individual Minutes  Time In: 4004  Time Out: 1304  Minutes: 12    Plan: Dysphagia treatment per POC. Diet Recommendations: Regular diet with thin liquid  MBS 10/25  Treatment: 12 minutes  Discharge Plan:  TBD closer to discharge. Discussed with JAKI Willoughby)  Patient's needs within reach. Siddhartha Alonzo MA, CCC-SLP  Speech-Language Pathologist, Rehab Services  [employer] [address] Certified Clinician  FEES Certified Clinician  Pager: [phone]  Office: [phone]    This document will serve as a discharge summary if patient discharges before next treatment session.

## 2021-10-24 NOTE — PROGRESS NOTES
Pt states she has worn cpap only one time before tonight. Pt states she prefers the oxygen cannula.  Currently on 3 lpm. spo2 93%

## 2021-10-24 NOTE — PROGRESS NOTES
without Rales/Wheezes/Rhonchi. Cardiovascular: Regular rate and rhythm with normal S1/S2 without murmurs, rubs or gallops. Abdomen: Soft, non-tender, non-distended with normal bowel sounds. Musculoskeletal: No clubbing, cyanosis or edema bilaterally. Neurologic:  Neurovascularly intact without any focal sensory/motor deficits. Cranial nerves: II-XII intact, grossly non-focal.  Psychiatric: Alert and oriented, sleepy, arousable  Peripheral Pulses: +2 palpable, equal bilaterally     Labs:   Recent Labs     10/23/21  1938   WBC 9.2   HGB 8.0*   HCT 23.7*        Recent Labs     10/23/21  1938 10/24/21  0346 10/24/21  0347    136  --    K 5.2* 5.1 5.2*    102  --    CO2 28 26  --    BUN 49* 48*  --    CREATININE 1.9* 1.8*  --    CALCIUM 8.4 8.5  --      Recent Labs     10/23/21  1938   AST 22   ALT 21   BILITOT <0.2   ALKPHOS 208*     Recent Labs     10/23/21  2003   INR 1.34*     Recent Labs     10/23/21  1938 10/24/21  0347 10/24/21  0851   TROPONINI 0.05* 0.04* 0.05*       Urinalysis:      Lab Results   Component Value Date    NITRU Negative 10/23/2021    WBCUA None seen 10/23/2021    BACTERIA Rare 10/23/2021    RBCUA 0-2 10/23/2021    BLOODU Negative 10/23/2021    SPECGRAV 1.020 10/23/2021    GLUCOSEU 100 10/23/2021       Radiology:  CT CHEST WO CONTRAST   Final Result   1. No acute intracranial findings. CHEST:         FINDINGS:      Patchy bilateral groundglass opacity and airspace disease in both upper lobes and to a lesser extent in both lower lobes consistent with pneumonia. Small right pleural effusion. No significant left pleural effusion. Normal heart size. No pericardial    effusion. Vascular aorta and main pulmonary artery are normal in caliber. Right IJ venous catheter tip at the cavoatrial junction. There is diffuse anasarca. Small gallstones are noted in the gallbladder.  There is a calcification in the tail the    pancreas measuring 1.1 cm which is stable since previous abdominal CT from June 2021. There is a cystic area in the tail the pancreas measuring 2.4 x 1.2 cm which is also unchanged. On previous chest CT from 2011, the cystic area in the tail the pancreas    measured 2.8 x 1.7 cm. No fractures are identified. IMPRESSION:   1. Moderate patchy bilateral airspace disease consistent with multifocal pneumonia including potential atypical viral etiologies. Clinical correlation recommended. 2. Trace right pleural effusion. 3. Anasarca. CT Head WO Contrast   Final Result   1. No acute intracranial findings. CHEST:         FINDINGS:      Patchy bilateral groundglass opacity and airspace disease in both upper lobes and to a lesser extent in both lower lobes consistent with pneumonia. Small right pleural effusion. No significant left pleural effusion. Normal heart size. No pericardial    effusion. Vascular aorta and main pulmonary artery are normal in caliber. Right IJ venous catheter tip at the cavoatrial junction. There is diffuse anasarca. Small gallstones are noted in the gallbladder. There is a calcification in the tail the    pancreas measuring 1.1 cm which is stable since previous abdominal CT from June 2021. There is a cystic area in the tail the pancreas measuring 2.4 x 1.2 cm which is also unchanged. On previous chest CT from 2011, the cystic area in the tail the pancreas    measured 2.8 x 1.7 cm. No fractures are identified. IMPRESSION:   1. Moderate patchy bilateral airspace disease consistent with multifocal pneumonia including potential atypical viral etiologies. Clinical correlation recommended. 2. Trace right pleural effusion. 3. Anasarca. XR CHEST PORTABLE   Final Result   1. Stable diffuse bilateral airspace disease.                Assessment/Plan:  Lou Gutierrez is a 61 y/o F w/ hx if RASHMI, COPD, systolic HF, DVT (5339), bilateral lower foot wounds w/ partial amputations, osteomyelitis on vancomycin and meropenem, CKD, DM who presents w/ AMS. EMS was called by the patient's daughters due to concern about waxing and waning mental status.      #Acute metabolic encephalopathy suspected 2/2 methadone vs hypoglycemia  On arrival to ED patient was only alert to her name. On the floor patient was alert and oriented X4 but was somnolent. Arousalable to verbal stimuli. CKD4 may cause decreased renal clearance of methadone  -Patient has previous hospitalization 4 days ago for similar presentation  -Received half dose of lantus (10U) overnight  -LDSS  -Follow glucose 4x daily  -Restarted home methadone at half dose (70mg)     #Acute hypoxic respiratory failure 2/2 decreased respiratory drive - improved  Patient has hx of COPD (30 pack year smoking hx). Baseline oxygen 2-3L. Echo 10/21 showed PASP of 60mmHg, EF ~55%, and mild LVH. RR depressed on presentation (as low as 7 breaths per minute). -Patient was hypoxic on RA at presentation, responsive to 4L on nasal cannula  -Cont home nebulizers, patient received 40 mg furosemide in ED  - currently on 3L, with improved RR     #Patchy Bilateral airspace disease possibly 2/2 volume overload  Unlikely bacterial due to continuous treatment w/ Vanc / and Merropenem  -COVID pending  -Echo 2018 had EF of 50-55%  -Received diuretics in ED, monitor Ins and outs     #Osteomyelitis  Cult polymicrobial with Ps aerug (R cipro), E coli, E faecalis. Seen by ID previously, IV abx ordered (vanc, merrem) through 11/26.   -Cont Vanc and Meropenem  -Patient's dressings were dry, no drainage, patient reports dressings were changed on day of presentation     #Elevated Troponin  -0.05 -> 0.04  -Could be secondary to dehydration, CKD     Chronic problems:  DM2- continue half dose of lantus, LDSS, hypoglycemia protocol, glucose checks QID  CKD4- avoid nephrotoxic agents, NSAIDs. Daily weights. Maintain SBP >90.   Moderate PAH- Echo 10/2021 showed PASP of 60mmHg  Hx of DVT - continue eliquis      DVT Prophylaxis: Eliquis  Diet: ADULT DIET;  Regular; 3 carb choices (45 gm/meal)  Code Status: Full Code    I will discuss the patient with [place] MD Daphne Randle MD  Internal Medicine Resident PGY-2  Contact via [employer]

## 2021-10-25 NOTE — PROGRESS NOTES
Occupational Therapy   Occupational Therapy Initial Assessment/Tx Note  Date: 10/25/2021   Patient Name: Yinka Saenz  MRN: [id number]     : 1965    Date of Service: 10/25/2021     Assessment: Functional independence is decreased from baseline. Pt demonstrates fluctuating levels of responsiveness and, thus, command following. She is limited by cognition as well as BLE NWB. Pt will need continued 24 hr care at d/c. Discharge Recommendations: Yinka Saenz scored a  on the AM-PAC ADL Inpatient form. Current research shows that an AM-PAC score of 17 or less is typically not associated with a discharge to the patient's home setting. Based on the patient's AM-PAC score and their current ADL deficits, it is recommended that the patient have 3-5 sessions per week of Occupational Therapy at d/c to increase the patient's independence. Please see assessment section for further patient specific details. OT Equipment Recommendations  Equipment Needed: No    Assessment   Performance deficits / Impairments: Decreased functional mobility ; Decreased ADL status; Decreased endurance;Decreased strength;Decreased cognition;Decreased balance;Decreased coordination  Treatment Diagnosis: Decreased activity tolerance, impaired ADLs and mobility  Decision Making: Medium Complexity  REQUIRES OT FOLLOW UP: Yes  Activity Tolerance  Activity Tolerance: Treatment limited secondary to decreased cognition  Safety Devices  Safety Devices in place: Yes  Type of devices: Call light within reach;Nurse notified; Bed alarm in place; Left in bed           Treatment Diagnosis: Decreased activity tolerance, impaired ADLs and mobility      Restrictions  Restrictions/Precautions  Restrictions/Precautions: Fall Risk (high fall risk)  Position Activity Restriction  Other position/activity restrictions: up with assistance; per chart review from last admission, NWB BLE    Subjective   General  Chart Reviewed:  Yes  Additional Pertinent Hx: 56 y.o. F admitted 10/23 with AMS due to hypoglycemia and methadone use. PMHx includes recent tx of OM with B partial foot amps, wound vac in place, asthma, COPD, CHF, neuropathy, blood clots, ETOH abuse  Family / Caregiver Present: No  Referring Practitioner: Ton Villafana  Subjective  Subjective: Pt in bed on entry. Difficult to rouse, not responsive until being moved to EOB by therapists. Also with periods of decreased responsiveneness in sitting, but with eyes open. General Comment  Comments: c/o pain with movement of legs and at trunk, RN monitoring pt for pain control    Social/Functional History  Social/Functional History  Type of Home: Facility  Additional Comments: most recently, at SNF following foot surgeries and wound vac placement due to BLE NWB. Prior to recent hospitalization, lived at home. Objective        Orientation  Overall Orientation Status: Impaired  Orientation Level: Oriented to person (attempted to ask orientation questions, but pt not responsive at times; decreased insight into situation, place, time)     Balance  Sitting Balance: Contact guard assistance (periods of min assist; tolerated EOB x 10 min with fluctating responsiveness, BP elevated but similar to recent readings.)  Standing Balance: Unable to assess(comment) (BLE NWB)  ADL  Grooming: Other (Comment) (not assessed - at times can reach to face level, other times UEs are rigid or pt does not follow commands)  UE Dressing: Maximum assistance (to change gown, UEs rigid and pt required max cues to improve positioning to assist)  LE Dressing: Dependent/Total (brief change)  Toileting: Dependent/Total (using purwick in bed, pt unable to safely access bathroom due to NWB)  Coordination  Movements Are Fluid And Coordinated: No  Coordination and Movement description: Gross motor impairments; Fine motor impairments     Bed mobility  Rolling to Left: Moderate assistance (cues)  Rolling to Right: Moderate assistance (cues)  Supine to Sit: 2 Person assistance;Dependent/Total  Sit to Supine: 2 Person assistance;Dependent/Total  Scootin Person assistance;Dependent/Total  Transfers  Transfer Comments: pt would require glory for transfers     Cognition  Overall Cognitive Status: Exceptions  Cognition Comment: fluctuating alertness, responsiveness, and command following; impaired orientation and insight          Plan  If pt discharges prior to next tx, this note will serve as d/c summary. Continue per POC if pt does not d/c.     Plan  Times per week: 2-5x  Times per day: Daily  Current Treatment Recommendations: Balance Training, Functional Mobility Training, Endurance Training, Patient/Caregiver Education & Training, Equipment Evaluation, Education, & procurement, Pain Management, Self-Care / ADL, Strengthening, Wheelchair Mobility Training, Cognitive Reorientation      AM-PAC Score   AM-PAC Inpatient Daily Activity Raw Score: 12 (10/25/21 1617)  AM-PAC Inpatient ADL T-Scale Score : 30.6 (10/25/21 1617)  ADL Inpatient CMS 0-100% Score: 66.57 (10/25/21 1617)  ADL Inpatient CMS G-Code Modifier : CL (10/25/21 1617)    Goals  Short term goals  Time Frame for Short term goals: by D/C  Short term goal 1:  Increase sitting tolerance to 20 min with SBA while participating in functional tasks - Not met  Short term goal 2: Roll side to side in bed with CGA to assist with self care - Not met       Therapy Time   Individual Concurrent Group Co-treatment   Time In 1407         Time Out 1500         Minutes 53          Timed Code Tx Min: 38  Total Tx Time: 270 [facility], OT

## 2021-10-25 NOTE — PROGRESS NOTES
Physician Progress Note      Nathalia Perez  [facility] #:                  [id number]  :                       1965  ADMIT DATE:       10/18/2021 11:38 AM  DISCH DATE:        10/20/2021 8:00 PM  RESPONDING  PROVIDER #:        Vonda Castillo          QUERY TEXT:    Patient admitted with hypoglycemia. Noted documentation of acute respiratory   failure in PN 10/19. In order to support the diagnosis of acute respiratory   failure, please include additional clinical indicators in your documentation. Or please document if the diagnosis of acute respiratory failure has been   ruled out after further study. The medical record reflects the following:  Risk Factors: 61 yo w/ hypoglycemia  Clinical Indicators: Per PN 10/19: Acute Hypoxic Respiratory Failure. New   oxygen requirement of 5L NC in ED. Per ED: In no distress. Pulm: Effort   normal on NRB. She does however remain hypoxic.  sats 83%, RR 25. Per H&P:   Pulmonary effort is normal. Diffuse coarse breath sounds. Per consult 10/18:   Respiratory: CTA B without w/r/r. Per PN 10/19: Patient denies SOB>  Treatment: up to 6L 02    Acute Respiratory Failure Clinical Indicators per  MS-DRG Training Guide and   Quick Reference Guide:  pO2 < 60 mmHg or SpO2 (pulse oximetry) < 91% breathing room air  pCO2 > 50 and pH < 7.35  P/F ratio (pO2 / FIO2) < 300  pO2 decrease or pCO2 increase by 10 mmHg from baseline (if known)  Supplemental oxygen of 40% or more  Presence of respiratory distress, tachypnea, dyspnea, shortness of breath,   wheezing  Unable to speak in complete sentences  Use of accessory muscles to breathe  Extreme anxiety and feeling of impending doom  Tripod position  Confusion/altered mental status/obtunded  Options provided:  -- Acute Respiratory Failure ruled out after study  -- Acute Respiratory Failure as evidenced by, Please document evidence.   -- Other - I will add my own diagnosis  -- Disagree - Not applicable / Not valid  -- Disagree - Clinically unable to determine / Unknown  -- Refer to Clinical Documentation Reviewer    PROVIDER RESPONSE TEXT:    Acute Respiratory Failure has been ruled out after study.     Query created by: David Barrios on 10/25/2021 7:01 AM      Electronically signed by:  Rodney Simpson 10/25/2021 7:49 AM

## 2021-10-25 NOTE — PROGRESS NOTES
4 Eyes Admission Assessment     I agree as the admission nurse that 2 RN's have performed a thorough Head to Toe Skin Assessment on the patient. ALL assessment sites listed below have been assessed on admission. Areas assessed by both nurses: ***  [x]   Head, Face, and Ears   [x]   Shoulders, Back, and Chest  [x]   Arms, Elbows, and Hands   [x]   Coccyx, Sacrum, and Ischium  [x]   Legs, Feet, and Heels        Does the Patient have Skin Breakdown? Yes a wound was noted on the Admission Assessment and an LDA was Initiated documentation include the Awilda-wound, Wound Assessment, Measurements, Dressing Treatment, Drainage, and Color\",  BLE ace wraps that are managed by podiatry outpatient, ERNESTINA. Wound vac present on bottom of right food with black sponge from SNF.          Ramirez Prevention initiated:  Yes   Wound Care Orders initiated:  NA      [facility] nurse consulted for Pressure Injury (Stage 3,4, Unstageable, DTI, NWPT, and Complex wounds) or Ramirez score 18 or lower:  NA      Nurse 1 eSignature: Electronically signed by Seferino Collier RN on 10/24/21 at 9:36 PM EDT    **SHARE this note so that the co-signing nurse is able to place an eSignature**    Nurse 2 eSignature: {Esignature:[id number]}

## 2021-10-25 NOTE — PROGRESS NOTES
Physical Therapy    Facility/Department: [address]  Initial Assessment    NAME: Kashmir Orosco  : 1965  MRN: [id number]    Date of Service: 10/25/2021    Discharge Recommendations:  Kashmir Orosco scored a  on the AM-PAC short mobility form. Current research shows that an AM-PAC score of 17 or less is typically not associated with a discharge to the patient's home setting. Based on the patient's AM-PAC score and their current functional mobility deficits, it is recommended that the patient have 3-5 sessions per week of Physical Therapy at d/c to increase the patient's independence. Please see assessment section for further patient specific details. If patient discharges prior to next session this note will serve as a discharge summary. Please see below for the latest assessment towards goals. PT Equipment Recommendations  Other: defer to next level of care    Assessment   Body structures, Functions, Activity limitations: Decreased functional mobility ; Decreased strength;Decreased balance;Decreased cognition  Assessment: Pt presents with the above deficits after presenting to the hospital with AMS and ansarca. Pt comes from SNF and had multiple falls at facilty per chart. Pt requires assistx 2 for bed mobility and is limited by lethargy throughout session which improved in upright position. Pt would benefit from continued inpatient PT inorder to progress towards PLOF and independence. Treatment Diagnosis: decreased functional mobility  Prognosis: Good  Decision Making: Medium Complexity  PT Education: Goals;PT Role;Plan of Care;Orientation; Functional Mobility Training  Patient Education: pt verbalized partial understanding- would benefit from continued reinforcement  Barriers to Learning: cognition  REQUIRES PT FOLLOW UP: Yes  Activity Tolerance  Activity Tolerance: Patient limited by cognitive status; Patient limited by fatigue;Patient limited by pain       Patient Diagnosis(es): The who presents w/ AMS. EMS was called by the patient's daughters due to concern about waxing and waning mental status. The patient was recently discharged on 10/16 during which she was diagnosed w/ osteomyelitis of feet and given would care, multiple I&D, and antibiotic therapy. She was then discharged to a skilled nursing facility. She was readmitted on 10/18 due to being found unresponsive after receiving 140 mg of methadone in the morning and she was also found to have Bg of 30 and was given glucagon at that time. Patient was started on D50 and IVF and podiatry performed an I&D w/ partial cuboid resection. The patient was discharged on 10/20 after mental status returned to A&OX4. Tonight, in the emergency department the patient was alert to name only. The patient was hypoxic on room air and was responsive to 4L nasal cannula. Per EMR the patient's baseline is 2-3 liters. The patient's blood glucose was found to be 65, K was 5.2 (no EKG changes), and Creatine was 1.9 (baseline 1.4-1.7). The patient was admitted to the medicine floor for further workup and care. Possible multifocal PNA. Diagnosis: AMS, anasarca, s/p 2 falls  General Comment  Comments: Pt supine in bed sleeping upon approach - pt difficult to wake up and lethargic upon being awoken. Pt alertness improves in upright/ when talkign to daughter on phone. Subjective  Subjective: Pt reports back pain when lying flat in bed d/t chronic back pain- pain resolves with repositioning. Pain Screening  Patient Currently in Pain: No          Orientation  Orientation  Overall Orientation Status: Impaired  Orientation Level: Oriented to person;Oriented to place; Disoriented to situation;Disoriented to time (difficult to assess due to lethargy- knows name but unable to provide birth date)  Social/Functional History  Social/Functional History  Type of Home: Facility  Additional Comments: most recently, at SNF following foot surgeries and wound vac placement due to BLE NWB. Prior to recent hospitalization, lived at home. Pt has difficulty answering questions d/t lethargy. Cognition   Cognition  Overall Cognitive Status: Exceptions  Arousal/Alertness: Delayed responses to stimuli;Inconsistent responses to stimuli  Following Commands: Follows one step commands with increased time; Follows one step commands with repetition  Attention Span: Difficulty attending to directions  Safety Judgement: Decreased awareness of need for safety  Problem Solving: Assistance required to implement solutions  Initiation: Requires cues for some  Sequencing: Requires cues for some  Cognition Comment: fluctuating alertness, responsiveness, and command following; impaired orientation and insight    Objective          AROM RLE (degrees)  RLE AROM: WFL  RLE General AROM: for bed mobility during session  AROM LLE (degrees)  LLE AROM : WFL  LLE General AROM: for bed mobility during session  Strength RLE  Comment: Difficult to assess due to lethargy- requires assist of 2 for bed mobility . Strength LLE  Comment: Difficult to assess due to lethargy- requires assist of 2 for bed mobility . Bed mobility  Rolling to Left: Moderate assistance (cues)  Rolling to Right: Moderate assistance (cues)  Supine to Sit: 2 Person assistance;Dependent/Total  Sit to Supine: 2 Person assistance;Dependent/Total  Scootin Person assistance;Dependent/Total  Comment: Supine<>sit completed with HOB raised d/t increased pain with HOB flat d/t chronic back pain. Pt tolerates HOB flat for short periods of time for rolling/scooting. Rolling completed multiple times B to complete pericare/place new brief/ go new gown d/t brief/gown saturated in urine. Purewick placed- RN alerted. Transfers  Comment: ERNESTINA d/t B NWB  Ambulation  Ambulation?: No     Balance  Comments: Pt sitd EOB for ~ 10 min with mod progressing to CGA for seated balance.         Plan   Plan  Times per week: 2-5  Plan weeks: until discharge  Current Treatment Recommendations: Strengthening, Balance Training, Functional Mobility Training, Home Exercise Program  Safety Devices  Type of devices:  All fall risk precautions in place, Bed alarm in place, Left in bed, Nurse notified, Call light within reach  Restraints  Initially in place: No    G-Code       OutComes Score                                                  AM-PAC Score  AM-PAC Inpatient Mobility Raw Score : 7 (10/25/21 1656)  AM-PAC Inpatient T-Scale Score : 26.42 (10/25/21 1656)  Mobility Inpatient CMS 0-100% Score: 92.36 (10/25/21 1656)  Mobility Inpatient CMS G-Code Modifier : CM (10/25/21 1656)          Goals  Short term goals  Time Frame for Short term goals: prior to discharge  Short term goal 1: pt will complete supine>sit with mod assist  Short term goal 2: pt will complete rolling with SBA  Short term goal 3: pt will sit EOB without UE support with SBA for 10 min  Patient Goals   Patient goals : none stated       Therapy Time   Individual Concurrent Group Co-treatment   Time In 1407         Time Out 1500         Minutes 53         Timed Code Treatment Minutes: [address] [facility], [address] [place] [address]

## 2021-10-25 NOTE — PLAN OF CARE
Problem: Nutrition  Goal: Optimal nutrition therapy  Outcome: Ongoing  Note: Nutrition Problem #1: Increased nutrient needs  Intervention: Food and/or Nutrient Delivery: Start Oral Nutrition Supplement, Continue Current Diet  Nutritional Goals: pt will tolerate PO diet to consume greater than 75% of meals and supplements offered through admit to promote wound healing.

## 2021-10-25 NOTE — CARE COORDINATION
CM recv'd call back from [address] [place] [address]  and they can clinically accept the patient when d/c :       CM recv'd call from Denisse Gutierrez at  [place] [address]. and he is urently reviewing as well. Electronically signed by Tammy Irvin RN on 10/25/2021 at 11:49 AM         CM recv'd call from Gus Lam at  [facility]  requesting clinicals  Re faxed  For referral     .fax  Sent [phone]    awaiting determination    Electronically signed by Tammy Irvin RN on 10/25/2021 at 9:39 AM     Tammy Irvin RN Case Manager  [facility]  [address] [place] [address].   [place] [address]  [phone]  Fax [phone]

## 2021-10-25 NOTE — PROGRESS NOTES
Problem: ALTERED NUTRIENT INTAKE - ADULT  Goal: Nutrient intake appropriate for improving, restoring or maintaining nutritional needs  INTERVENTIONS:  - Assess nutritional status and recommend course of action  - Monitor oral intake, labs, and treatment pl Progress Note    Admit Date: 10/23/2021  Day: 3  Diet: ADULT DIET; Regular; 3 carb choices (45 gm/meal)    CC: AMS    Interval history: NAEO, Pt is awake she is alert and oriented x3. Saturating appropriately on 3L O2. She is complaining or left sided rib pain. Pt is very slow to answer questions. Sometimes forgetting what was asked. Has some SOB. Denies HA, lightheadedness, NV, FC, CP, abd pain, diarrhea, dysuria    HPI:   Pankaj Duke is a 61 y/o F w/ hx if RASHMI, COPD, diastolic HF, DVT (7822), bilateral lower foot wounds w/ partial amputations, osteomyelitis on vancomycin and meropenem, CKD, DM who presents w/ AMS. EMS was called by the patient's daughters due to concern about waxing and waning mental status. The patient was recently discharged on 10/16 during which she was diagnosed w/ osteomyelitis of feet and given would care, multiple I&D, and antibiotic therapy. She was then discharged to a skilled nursing facility. She was readmitted on 10/18 due to being found unresponsive after receiving 140 mg of methadone in the morning and she was also found to have Bg of 30 and was given glucagon at that time. Patient was started on D50 and IVF and podiatry performed an I&D w/ partial cuboid resection. The patient was discharged on 10/20 after mental status returned to A&OX4. Tonight, in the emergency department the patient was alert to name only. The patient was hypoxic on room air and was responsive to 4L nasal cannula. Per EMR the patient's baseline is 2-3 liters. The patient's blood glucose was found to be 65, K was 5.2 (no EKG changes), and Creatine was 1.9 (baseline 1.4-1.7). The patient was admitted to the medicine floor for further workup and care.      Medications:     Scheduled Meds:   sodium chloride flush  5-40 mL IntraVENous 2 times per day    amitriptyline  100 mg Oral Nightly    aspirin  81 mg Oral Daily    atorvastatin  20 mg Oral Nightly    apixaban  5 mg Oral BID    escitalopram  10 mg Oral Daily  furosemide  40 mg Oral Daily    gabapentin  400 mg Oral BID    miconazole   Topical BID    pantoprazole  40 mg Oral Daily    meropenem (MERREM) 1000 mg IVPB (mini-bag)  1,000 mg IntraVENous 2 times per day    insulin lispro  0-6 Units SubCUTAneous TID WC    insulin lispro  0-3 Units SubCUTAneous Nightly    insulin glargine  10 Units SubCUTAneous Daily    vancomycin  750 mg IntraVENous Q24H    methadone  70 mg Oral Daily     Continuous Infusions:   sodium chloride      dextrose      sodium chloride 75 mL/hr at 10/24/21 2127     PRN Meds:sodium chloride flush, sodium chloride, ondansetron **OR** ondansetron, polyethylene glycol, acetaminophen **OR** acetaminophen, glucose, dextrose, glucagon (rDNA), dextrose    Objective:   Vitals:   T-max:  Patient Vitals for the past 8 hrs:   BP Temp Temp src Pulse Resp SpO2 Weight   10/25/21 0438       217 lb 2.5 oz (98.5 kg)   10/25/21 0412 (!) 147/69 98.3 °F (36.8 °C) Oral 67 24 91 %        Intake/Output Summary (Last 24 hours) at 10/25/2021 0806  Last data filed at 10/24/2021 1100  Gross per 24 hour   Intake    Output 100 ml   Net -100 ml       Review of Systems   All other systems reviewed and are negative. Physical Exam  Constitutional:       Appearance: She is ill-appearing. HENT:      Head: Normocephalic and atraumatic. Eyes:      Extraocular Movements: Extraocular movements intact. Conjunctiva/sclera: Conjunctivae normal.   Cardiovascular:      Rate and Rhythm: Normal rate and regular rhythm. Pulmonary:      Effort: Pulmonary effort is normal.      Breath sounds: Rales present. Abdominal:      General: Abdomen is flat. Palpations: Abdomen is soft. There is hepatomegaly. Tenderness: There is abdominal tenderness. Comments: RUQ pain   Musculoskeletal:         General: Normal range of motion. Cervical back: Normal range of motion. Right lower leg: Edema present. Left lower leg: Edema present.    Skin: General: Skin is warm and dry. Neurological:      General: No focal deficit present. Mental Status: She is alert. Psychiatric:         Mood and Affect: Mood normal.         LABS:    CBC:   Recent Labs     10/23/21  1938 10/25/21  0529   WBC 9.2 7.5   HGB 8.0* 7.3*   HCT 23.7* 21.8*    343   MCV 86.5 85.9     Renal:    Recent Labs     10/23/21  1938 10/23/21  1938 10/24/21  0346 10/24/21  0347 10/25/21  0529     --  136  --  140   K 5.2*   < > 5.1 5.2* 4.7     --  102  --  104   CO2 28  --  26  --  30   BUN 49*  --  48*  --  40*   CREATININE 1.9*  --  1.8*  --  1.6*   GLUCOSE 65*  --  186*  --  132*   CALCIUM 8.4  --  8.5  --  8.6   ANIONGAP 8  --  8  --  6    < > = values in this interval not displayed. Hepatic:   Recent Labs     10/23/21  1938   AST 22   ALT 21   BILITOT <0.2   PROT 6.3*   LABALBU 2.3*   ALKPHOS 208*     Troponin:   Recent Labs     10/24/21  0851 10/24/21  2141 10/25/21  0231   TROPONINI 0.05* 0.03* 0.03*     BNP: No results for input(s): BNP in the last 72 hours. Lipids: No results for input(s): CHOL, HDL in the last 72 hours. Invalid input(s): LDLCALCU, TRIGLYCERIDE  ABGs:  No results for input(s): PHART, KSD0SYY, PO2ART, IXP0RUU, BEART, THGBART, E1SFCVVT, MPH1PGD in the last 72 hours. INR:   Recent Labs     10/23/21  2003   INR 1.34*     Lactate: No results for input(s): LACTATE in the last 72 hours. Cultures:  -----------------------------------------------------------------  RAD:   CT CHEST WO CONTRAST   Final Result   1. No acute intracranial findings. CHEST:         FINDINGS:      Patchy bilateral groundglass opacity and airspace disease in both upper lobes and to a lesser extent in both lower lobes consistent with pneumonia. Small right pleural effusion. No significant left pleural effusion. Normal heart size. No pericardial    effusion. Vascular aorta and main pulmonary artery are normal in caliber.   Right IJ venous catheter tip at the cavoatrial junction. There is diffuse anasarca. Small gallstones are noted in the gallbladder. There is a calcification in the tail the    pancreas measuring 1.1 cm which is stable since previous abdominal CT from June 2021. There is a cystic area in the tail the pancreas measuring 2.4 x 1.2 cm which is also unchanged. On previous chest CT from 2011, the cystic area in the tail the pancreas    measured 2.8 x 1.7 cm. No fractures are identified. IMPRESSION:   1. Moderate patchy bilateral airspace disease consistent with multifocal pneumonia including potential atypical viral etiologies. Clinical correlation recommended. 2. Trace right pleural effusion. 3. Anasarca. CT Head WO Contrast   Final Result   1. No acute intracranial findings. CHEST:         FINDINGS:      Patchy bilateral groundglass opacity and airspace disease in both upper lobes and to a lesser extent in both lower lobes consistent with pneumonia. Small right pleural effusion. No significant left pleural effusion. Normal heart size. No pericardial    effusion. Vascular aorta and main pulmonary artery are normal in caliber. Right IJ venous catheter tip at the cavoatrial junction. There is diffuse anasarca. Small gallstones are noted in the gallbladder. There is a calcification in the tail the    pancreas measuring 1.1 cm which is stable since previous abdominal CT from June 2021. There is a cystic area in the tail the pancreas measuring 2.4 x 1.2 cm which is also unchanged. On previous chest CT from 2011, the cystic area in the tail the pancreas    measured 2.8 x 1.7 cm. No fractures are identified. IMPRESSION:   1. Moderate patchy bilateral airspace disease consistent with multifocal pneumonia including potential atypical viral etiologies. Clinical correlation recommended. 2. Trace right pleural effusion. 3. Anasarca. XR CHEST PORTABLE   Final Result   1. Stable diffuse bilateral airspace disease. Assessment/Plan:    Kobe Magallanes is a 59 y/o F w/ hx if RASHMI, COPD, diastolic CHF (EF 77%), DVT (2004), bilateral lower foot wounds w/ partial amputations, osteomyelitis on vancomycin and meropenem, CKD, DM who presents w/ AMS. EMS was called by the patient's daughters due to concern about waxing and waning mental status.     Acute metabolic encephalopathy suspected 2/2 methadone vs hypoglycemia  On arrival to ED patient was only alert to her name. On the floor patient was alert and oriented X4 but was somnolent. Arousalable to verbal stimuli. CKD4 may cause decreased renal clearance of methadone  -Patient has previous hospitalization 4 days ago for similar presentation  -continue on half dose Lantus (10U)  -LDSS  -Follow glucose 4x daily  -Restarted home methadone at half dose (70mg), agreed with patient to inc to 120mg     Acute hypoxic respiratory failure 2/2 decreased respiratory drive - improved  Patient has hx of COPD (30 pack year smoking hx). Baseline oxygen 2-3L. Echo 10/21 showed PASP of 60mmHg, EF ~55%, and mild LVH. RR depressed on presentation (as low as 7 breaths per minute). -Patient was hypoxic on RA at presentation, responsive to 4L on nasal cannula  -Cont home nebulizers, patient received 40 mg furosemide in ED  - currently on 3L, with improved RR  -continues to have diffuse crackles bilaterally, switched from po to IV lasix 40 bid x3 doses     Patchy Bilateral airspace disease possibly 2/2 volume overload  Unlikely bacterial due to continuous treatment w/ Vanc / and Merropenem  -COVID pending  -Echo 2018 had EF of 50-55%  -Received diuretics in ED  -40mg oral lasix  -strict I's and O's  -lasix as above     RUQ Pain  Positive Leavitt's sign, hepatomegaly, gallstones on CT, hep  States she quit smoking 20 years ago  -Hepatic function panel  -RUQ U/S of gallbladder and liver    Osteomyelitis  Cult polymicrobial with Ps aerug (R cipro), E coli, E faecalis.  Seen by ID previously, IV abx ordered (vanc, merrem) through 11/26.   -Cont Vanc and Meropenem  -Patient's dressings were dry, patient reports dressings were changed on day of presentation  -wound vac draining serosanguinous fluid     Elevated Troponin  -0.05 -> 0.04  -Could be secondary to dehydration, CKD  -downtrending     Chronic problems:  DM2   - continue half dose of lantus, LDSS, hypoglycemia protocol, glucose checks QID    CKD4  - avoid nephrotoxic agents, NSAIDs. Daily weights. Maintain SBP >90.     Moderate PAH  - Echo 10/2021 showed PASP of 60mmHg    Hx of DVT   - continue eliquis    Anxiety/Depression  -continue home amitryptiline, escitalopram    Code Status:FULL  FEN: Adult regular, 3 carb  PPX: eliquis  DISPO: Joselito Pantoja MD, PGY-1  10/25/21  8:06 AM    This patient has been staffed and discussed with Sandy Bruno MD.

## 2021-10-25 NOTE — PROCEDURES
INSTRUMENTAL SWALLOW REPORT  MODIFIED BARIUM SWALLOW/DC    NAME: Trina Cleveland   : 1965  MRN: [id number]       Date of Eval: 10/25/2021     Ordering Physician: Dr. Nishant Lakhani  Radiologist: Dr Suzie Lopez     Referring Diagnosis(es): Referring Diagnosis: anasarca    Past Medical History:  has a past medical history of Asthma, Bacterial vaginosis, Carpal tunnel syndrome, COPD (chronic obstructive pulmonary disease) ([facility] [address] Diabetes mellitus type II, Diabetic neuropathy ([facility] [address] Diastolic CHF ([facility] [address] DVT (deep venous thrombosis) ([facility] [address] Dyslipidemia, Dyspareunia, ETOH abuse, HTN (hypertension), Hx of blood clots, Hyperlipidemia, MRSA (methicillin resistant staph aureus) culture positive, Neuropathy, Pancreatitis, Tobacco abuse, and Uses wheelchair. Past Surgical History:  has a past surgical history that includes  section (unknown); pre-malignant / benign skin lesion excision (7003); other surgical history (Left, 2016); Toe amputation (Left, 2017); other surgical history (Right, 10/20/2017); other surgical history (Right, 2018); pr debridement, skin, sub-q tissue,muscle,bone,=<20 sq cm (Right, 2018); Foot Debridement (Right, 2019); Foot Debridement (Right, 2019); Foot Debridement (Right, 2019); Foot Debridement (Left, 10/23/2019); Tonsillectomy; knee surgery (Left); Foot Debridement (Right, 3/29/2021); IR TUNNELED CVC PLACE WO SQ PORT/PUMP > 5 YEARS (10/5/2021); Foot Debridement (10/7/2021); and Foot Debridement (Bilateral, 10/13/2021). Current Diet Solid Consistency: Regular  Current Diet Liquid Consistency: Thin  Type of Study: Initial MBS    CT Chest (10/23):  1. Moderate patchy bilateral airspace disease consistent with multifocal pneumonia including potential atypical viral etiologies. Clinical correlation recommended. 2. Trace right pleural effusion. 3. Anasarca.     Patient Complaints/Reason for Referral:  Trina Cleveland was referred for a MBS to assess the efficiency of his/her swallow function, assess for aspiration, and to make recommendations regarding safe dietary consistencies, effective compensatory strategies, and safe eating environment. Onset of problem:   Date of Onset: 10/23/21    Behavior/Cognition/Vision/Hearing:  Behavior/Cognition: Alert; Cooperative    Impressions:  Pt swallow WFL's. Oral phase characterized by decreased /prolonged mastication with solid texture, due to edentulism. No  penetration/aspiration or pharyngeal residue noted with any consistencies. Pt noted to hold liquid bolus and swish around in oral cavity. Pt stated it was because she didn't like the barium. Pt did grimace and make a face with each trial, with displeasure as to taste. On review of bedside eval, this was not identified. Recommend downgrade diet to easy to chew consistency - due to edentulism. Pt in agreement    Treatment Dx and ICD 10: dysphagia   Patient Position: Lateral and Patient Degrees: 90  Consistencies Administered: Reg solid; Dysphagia Soft and Bite-Sized (Dysphagia III); Dysphagia Pureed (Dysphagia I); Thin teaspoon; Thin cup; Thin straw    Dysphagia Outcome Severity Scale: Level 6: Within functional limits/Modified independence  Penetration-Aspiration Scale (PAS): 1 - Material does not enter the airway    Recommended Diet:  Solid consistency: Easy to Chew  Liquid consistency:  Thin  Liquid administration via: Cup;Straw    Safe Swallow Protocol:  Supervision: Distant  Compensatory Swallowing Strategies: Upright as possible for all oral intake  Check for pocketing of food    Recommendations/Treatment  Requires SLP Intervention: No  D/C Recommendations:  (no follow up indicated)    Recommended Exercises: n/a   Therapeutic Interventions: Patient/Family education    Education: Images and recommendations were reviewed with pt following this exam.   Patient Education: pt educated to results of MBS  Patient Education Response: Verbalizes understanding    Prognosis for safe diet advancement: good  Duration/Frequency of Treatment  Duration/Frequency of Treatment: no follow up indicated    Goals:    1. Patient will tolerate least restrictive diet with no aspiration evidenced by MBS/ with 100% acc independently. 10/25: goal met, MBS completed    2. Patient will demonstrate use of safe swallowing behaviors including: upright position, small bolus size, solid/liquid alternation with 90% acc with min cues. 10/25; goal met- pt educated to results of MBS and recommendations. Pt stated comprehension    Oral Preparation / Oral Phase  Oral Phase - Major Contributing Deficits  Decreased Mastication: All - due to edentulism    Pharyngeal Phase  Pharyngeal Phase: [place]    Esophageal Phase  Esophageal Screen: [facility]    Pain   Patient Currently in Pain: No    Therapy Time:   Individual Concurrent Group Co-treatment   Time In 1030         Time Out 1050         Minutes 20            Plan:  Recommended diet: downgrade to Easy to Chew/thin liquids  Dc recommendation: no follow up indicated   Leonela Paz M.S./[facility] #[id number]  Pg.  # [id number]  Needs met prior to leaving radiology, results d/w Bryanna Marquez RN   10/25/2021, 12:12 PM

## 2021-10-25 NOTE — PROGRESS NOTES
Occupational/Physical Therapy      Attempted OT/PT evals. Pt leaving the floor for testing. Will return later today vs. 10/26 as pt and therapy schedules allow.      Davina Ramey, OTR/L, [id number]

## 2021-10-25 NOTE — PROGRESS NOTES
Clinical Pharmacy Consult Note    Vancomycin - Management by Pharmacy    Consult Date(s): 10/24/21  Consulting Provider(s): Dr. Kaitlin Arriola / Plan  1) Hx of bilateral foot infection / osteomyelitis - Vancomycin + Meropenem   Concurrent Antimicrobials:   o Meropenem - Day #24   Day of Vanc Therapy: #24   Current Dosing Method: AUC   Therapeutic Goal: 400-600 mg/mL*h   o Current Dose / Frequency: 750mg IV q24h  o With level drawn yesterday AM and improved SCr this AM, kinetic estimates predict an AUC of 528 and trough of 17.8 on this regimen. As kinetics remain stable from prior admissions, will continue with 750mg IV q24h for now.   o Will likely plan to check repeat level in 2-3 days.  Will continue to monitor clinical condition and make adjustments to regimen as appropriate. Please call with questions--  Thanks--  Shaheed Hernandez, PharmD, [id number]  [id number] ([facility])   10/25/2021 12:07 PM      Subjective/Objective:  Kashmir Orosco is a 60 y.o. female with a PMHx significant for obesity, RASHMI, HFrEF, bilateral foot wounds s/p partial amputation, CKD, chronic pain, DM who was recently admitted 10/1-10/16 for bilateral foot wounds, found to have osteomyelitis requiring I&D. Wound cultures grew Pseudomonas, E. Coli and Enterococcus. Pt was discharged on vancomycin and meropenem with recommendations from ID to continue through 11/26. Patient was then admitted 10/18-10/20 with AMS thought to be 2/2 hypoglycemia. Insulin was adjusted, and pt was discharged back to SNF. Pt now represents with AMS thought to be 2/2 hypoglycemia and possible accumulation of Methadone in setting of CKD 4. Pharmacy has been consulted to dose vancomycin. Pertinent Antimicrobials:  Meropenem 1000 mg IV q12h (10/2-current)   Vancomycin - pharmacy to dose (10/2-current)   750 mg IV q24h (10/15-current)    Of note, known to clinical pharmacy team from earlier two admissions in Oct 2021.  Pt was eventually scheduled on vancomycin 750 mg IV q24h with plans to continue through 11/26/21. Regimen predicted AUC of 503 and trough 16.1 as of 10/19/21. Ht Readings from Last 1 Encounters:   10/24/21 5' (1.524 m)      Wt Readings from Last 1 Encounters:   10/25/21 217 lb 2.5 oz (98.5 kg)     Vancomycin Level(s) / Doses:  Date Time Dose Level / Type of Level Interpretation   10/19 0630 750 mg IV q24h Random = 12.3 mcg/mL Drawn ~23h after previous dose. Predicted AUC = 503 & steady state trough + 16.1.   10/24 0851 750mg IV q24h Random = 18.7 mcg/mL Drawn ~24h after previous dose. Predicted  & steady state trough = 17.8. Note: Serum levels collected for AUC-based dosing may be high if collected in close proximity to the dose administered. This is not necessarily an indicator of toxicity. Recent Labs     10/23/21  1938 10/24/21  0346 10/25/21  0529   CREATININE 1.9* 1.8* 1.6*   BUN 49* 48* 40*   WBC 9.2  --  7.5       Estimated Creatinine Clearance: 40 mL/min (A) (based on SCr of 1.6 mg/dL (H)).     Cultures & Sensitivities:  Date Site Micro Susceptibility / Result   10/24 Blood x2 No growth to date    10/14 Rapid flu negative

## 2021-10-26 NOTE — DISCHARGE INSTR - COC
ETOH abuse F10.10    Pseudocyst, pancreas K86.3    Asthma J45.909    Nicotine addiction F17.200    Hypoxia R09.02    Onychomycosis B35.1    Depression F32. A    Anxiety state F41. 1    Tinea pedis B35.3    Burn T30.0    Obesity (BMI 30-39. 9) E66.9    S/P foot surgery, right Z98.890    Open wound of left foot with complication G32.815Y    Cellulitis L03.90    Bilateral lower extremity edema R60.0    Chronic multifocal osteomyelitis of left foot ([facility]) T20.025    Acute systolic congestive heart failure (HCC) I50.21    Acute osteomyelitis of metatarsal bone of right foot ([facility]) M86.171    Bronchitis J40    Cellulitis and abscess of foot L03.119, L02.619    Group B streptococcal infection A49.1    Tendonitis, Achilles, right M76.61    Diabetic ulcer of right midfoot associated with type 2 diabetes mellitus, limited to breakdown of skin ([facility]) E11.621, L97.411    Opiate dependence ([facility]) F11.20    Class 2 obesity due to excess calories with body mass index (BMI) of 37.0 to 37.9 in adult E66.09, Z68.37    CKD (chronic kidney disease), stage III ([facility]) N18.30    Diabetic foot infection ([facility]) E11.628, L08.9    Chronic osteomyelitis of right foot with draining sinus ([facility]) M86.471    Acute kidney injury superimposed on CKD ([facility]) N17.9, N18.9    Acute blood loss anemia D62    Aspiration pneumonitis ([facility]) J69.0    Altered mental status R41.82    Chronic systolic heart failure ([facility]) I50.22    Gastroesophageal reflux disease K21.9    Type 2 diabetes mellitus with right diabetic foot infection ([facility]) J10.898, F34.2    Systolic CHF, acute ([facility]) I50.21    Type 2 diabetes mellitus with left diabetic foot infection ([facility]) E11.628, L08.9    Lymphedema of left leg I89.0    Lymphedema of right lower extremity I89.0    Charcot's joint of ankle M14.679    Elevated sed rate R70.0    Elevated C-reactive protein (CRP) R79.82    History of transmetatarsal amputation of right foot ([facility] Z89.431    History of alcoholism ([facility] F10.21    Ex-smoker X87.148 History of MRSA infection Z86.14    CKD (chronic kidney disease) stage 4, GFR 15-29 ml/min ([facility]) N18.4    Acute on chronic congestive heart failure ([facility]) I50.9    Acute encephalopathy G93.40    Encephalopathy acute G93.40       Isolation/Infection:   Isolation            No Isolation          Patient Infection Status       Infection Onset Added Last Indicated Last Indicated By Review Planned Expiration Resolved Resolved By    None active    Resolved    COVID-19 Rule Out 10/23/21 10/23/21 10/24/21 COVID-19 (Ordered)   10/24/21 Rule-Out Test Resulted    COVID-19 Rule Out 10/18/21 10/18/21 10/18/21 COVID-19, Rapid (Ordered)   10/18/21 Rule-Out Test Resulted    MRSA  08/02/11 08/02/11 Jo Noe RN   05/11/15 Sandy Vásquez RN    ca/colonization spt and nares            Nurse Assessment:  Last Vital Signs: BP (!) 184/76   Pulse 83   Temp 98 °F (36.7 °C) (Oral)   Resp 18   Ht 5' (1.524 m)   Wt 220 lb 6.4 oz (100 kg)   LMP 10/23/2015   SpO2 91%   BMI 43.04 kg/m²     Last documented pain score (0-10 scale): Pain Level: 8  Last Weight:   Wt Readings from Last 1 Encounters:   10/26/21 220 lb 6.4 oz (100 kg)     Mental Status:  oriented and alert    IV Access:  - PICC - site  Right IJ, insertion date: 10/5/21. Nursing Mobility/ADLs:  Walking   Dependent  Transfer  Dependent  Bathing  Dependent  Dressing  Dependent  Toileting  Dependent  Feeding  Independent  Med Admin  Assisted  Med Delivery   whole    Wound Care Documentation and Therapy:  Negative Pressure Wound Therapy Foot Right (Active)   Wound Type Surgical 10/16/21 1553   Dressing Type Black foam 10/24/21 0202   Target Pressure (mmHg) 125 10/16/21 1553   Dressing Status Clean;Dry; Intact 10/16/21 1553   Dressing Changed Dressing reinforced 10/16/21 1553   Drainage Amount None 10/16/21 1553   Output (ml) 0 ml 10/16/21 1553   Wound Assessment Other (Comment) 10/25/21 1655   Number of days: 12       Wound 06/17/21 (Active)   Number of days: [address]ett [phone] and facility MD with any questions or concerns. Please continue heart failure education to patient and family/support system. See After Visit Summary for hospital follow up appointment details. Consider spiritual care referral for support and/or completion of advance directives . Consider: having the facility MD complete required 7 day follow up, [place] [address] telehealth program if patient agreeable and able to participate and palliative care consult for ongoing goals of care, end of life, and/or chronic disease management discussions. Rehab Therapies: Physical Therapy and Occupational Therapy  Weight Bearing Status/Restrictions: Non-weight bearing on left leg and right leg  Other Medical Equipment (for information only, NOT a DME order):  hospital bed  Other Treatments: ***    Patient's personal belongings (please select all that are sent with patient):  {CHP DME Belongings:[id number]:::0}    RN SIGNATURE:  {Esignature:[id number]:::0}    CASE MANAGEMENT/SOCIAL WORK SECTION    Inpatient Status Date: 10/24/2021    Readmission Risk Assessment Score:  Readmission Risk              Risk of Unplanned Readmission:  42           Discharging to Facility/ [facility] [address]               [address] [place] [address]       Phone: [phone]       Fax: [phone]        D/C w/ Wound Vac. Dialysis Facility (if applicable)   Name:  Address:  Dialysis Schedule:  Phone:  Fax:    / signature: Electronically signed by Tammy Irvin RN on 11/1/21 at 1:44 PM EDT    PHYSICIAN SECTION    Prognosis: Fair    Condition at Discharge: Stable    Rehab Potential (if transferring to Rehab):  Fair    Recommended Labs or Other Treatments After Discharge:   Lasix increased to 40 mg bid  Patient is on Methadone for chronic back pain x 20 yrs  For now we will give prescription for oxycodone 5 mg x 2 days and need coordination with methadone clinic  I have stopped methadone, tried lower dose but despite that she gets lethagic and zones out  Recommend weaning of narcotics in the next couple of days    INFUSION ORDERS: modified 10/16  Vancomycin 750 mg iv daily through 11/26  Meropenem 1 gm iv q 12 through 11/26  - Diagnosis - DM foot osteomyelitis   - First dose given in hospital  - PICC   - Disposition / date discharge  - Check CBC w diff, CMP, ESR, CRP, CK every Mon or Tue - FAX result to [phone]  - Call with antibiotic / infusion issues, [phone]  - Call with any change in status, transfer in or out of a facility or to hospital - [phone]  - No f/u in outpatient ID office necessary    Podiatry Wound Care Discharge Instructions  Wound VAC dressing change instructions  -Please perform wound VAC dressing change every Monday, Wednesday, Friday to the RIGHT FOOT  -Please apply adaptic nonadherent dressing over the sutures of the incision distal to the wound of the right foot  -Using adhesive sheet from wound Vac kit, cut to size that will be placed over the wound and surrounding soft tissue to  \"window out\" the wound  -Next, using a scissors cut the adhesive sheet out that is overlying the wound bed  -Next, apply small VAC WHITEFOAM to the wound to cover the exposed bone in the wound  -Next, using the black foam from the wound VAC kit, cut to size the black foam to fit over the wound bed  -Next, using the adhesive sheet, place the black foam over the wound bed and then place the adhesive sheet over the  black foam to hold in place  -Next, using a scissors cut a quarter size hole in the adhesive sheet/black foam for the wound VAC suction connector to be  placed over  -Next, place the wound VAC suction connector over the cut out area on the black foam  -Next, hook up the connector to the other portion of the wound VAC canister  -Set wound VAC to VAC therapy and running continuously for 125 mmHg  -Ensure that a good seal is noted. If needed re-enforce areas with additional adhesive sheet stripes from the wound VAC kit  -Next, dress the area with gauze over the wound VAC area and place gauze along the top of the foot and ankle  -Next, using kerlix wrap from the toes up and just above the ankle  -Next, using Ace bandage, wrap from the toes up and just above the ankle with CARE to ensure wound VAC tubing is NOT  in direct contact with the skin      Please perform every other day dressing changes to left lower extremity as follows  -Apply adaptic nonadherent dressing sutures of the left foot  -Next, apply gauze over the incision of the left foot  -Next apply gauze to the top of the left foot, ankle, and leg  -Next loosely wrap the left lower extremity with Kerlix starting from just in front of the toes and ending just below the knee  -Next gently wrap the left foot with 4 inch Ace bandage starting from just in front of the toes and ending just above the  ankle  -Next gently wrap the left leg with 6 inch Ace bandage starting from just above the ankle and ending just below the right  knee  Patient is NON weightbearing to Bilateral lower extremities    Please follow-up with Dr. Fco Arambula DPGAGE in the [address] [place] [address] on the first Monday after being  discharged from the hospital. Patient must be seen at the clinic within 1 week of discharge      Physician Certification: I certify the above information and transfer of Tara aWrren  is necessary for the continuing treatment of the diagnosis listed and that she requires [facility] for less 30 days.      Update Admission H&P: No change in H&P    PHYSICIAN SIGNATURE:  Electronically signed by Lara Davis MD on 10/31/21 at 11:54 AM EDT

## 2021-10-26 NOTE — CONSULTS
2004    Tobacco abuse 2008    Uses wheelchair     also uses walker     Past Surgical History:        Procedure Laterality Date     SECTION  unknown    FOOT DEBRIDEMENT Right 2019    INCISION AND DRAINAGE WITH APPLICATION OF STRAVIX GRAFT RIGHT FOOT performed by Leticia Murray DPM at [address] [place] [address] Right 2019    RIGHT FOOT INCISION AND DRAINAGE WITH STAGING TRANSMETATARSAL AMPUTATION performed by Leticia Murray DPM at [address] [place] [address] Right 2019    RIGHT FOOT DEBRIDEMENT INCISION AND DRAINAGE, OPEN DIABETIC FOOT ULCER WITH GRAFT PLACEMENT performed by Leticia Murray DPM at [address] [place] [address] Left 10/23/2019    LEFT FOOT INCISION AND DRAINAGE , DEBRIDEMENT OF OPEN WOUND, APPLICATION OF STRAVIX GRAFT performed by Leticia Murray DPM at [address] [place] [address] Right 3/29/2021    INCISION AND DRAINAGE, DEBRIDEMENT OF DIABETIC WOUND WITH PLACEMENT OF STRAVIX GRAFT RIGHT FOOT performed by Leticia Murray DPM at [address] [place] [address]  10/7/2021    BILATERAL LOWER EXTREMITY INCISION AND DRAINAGE, RIGHT FOOT 4TH RAY RESECTION, LEFT FOOT 5TH RAY RESECTION performed by Leticia Murray DPM at [address] [place] [address] Bilateral 10/13/2021    REPEAT INCISION AND DRAINAGE OF ALL NONVIABLE SOFT TISSUE AND BONE/ PARTIAL CUBOID RESECTION/ BONY BIOPSY AS NEEDED/ WOUND VAC RIGHT LOWER EXTREMITY performed by Leticia Murray DPM at [address] IR TUNNELED [address] 5 YEARS  10/5/2021    IR TUNNELED CATHETER PLACEMENT GREATER THAN 5 YEARS 10/5/2021 [facility] PROCEDURES    KNEE SURGERY Left     from falling off ladder -- has screws in place pt report    OTHER SURGICAL HISTORY Left 2016    I & D left foot    OTHER SURGICAL HISTORY Right 10/20/2017    RIGHT GASTROC LENGTHENING ENDOSCOPIC, INJECTION OF AMNI GRAFT    OTHER SURGICAL HISTORY Right 2018    Diabetic foot ulcer I&D w/ integra graft application    MS DEBRIDEMENT, SKIN, SUB-Q TISSUE,MUSCLE,BONE,=<20 SQ CM Right 8/17/2018    RIGHT FOOT DEBRIDEMENT INCISION AND DRAINAGE, PARTIAL 5TH RAY AMPUTATION performed by Sarkis Phillips DPM at [address] [facility] / [address]  7/7003    cryotherapy done on lesion    TOE AMPUTATION Left 02/24/2017    AMPUTATION LEFT GREAT TOE                 TONSILLECTOMY       Current Medications:    Current Facility-Administered Medications: metoprolol tartrate (LOPRESSOR) tablet 25 mg, 25 mg, Oral, BID  methadone (DOLOPHINE) 10 MG/ML solution 120 mg, 120 mg, Oral, Daily  furosemide (LASIX) injection 40 mg, 40 mg, IntraVENous, BID  sodium chloride flush 0.9 % injection 5-40 mL, 5-40 mL, IntraVENous, 2 times per day  sodium chloride flush 0.9 % injection 5-40 mL, 5-40 mL, IntraVENous, PRN  0.9 % sodium chloride infusion, 25 mL, IntraVENous, PRN  ondansetron (ZOFRAN-ODT) disintegrating tablet 4 mg, 4 mg, Oral, Q8H PRN **OR** ondansetron (ZOFRAN) injection 4 mg, 4 mg, IntraVENous, Q6H PRN  polyethylene glycol (GLYCOLAX) packet 17 g, 17 g, Oral, Daily PRN  acetaminophen (TYLENOL) tablet 650 mg, 650 mg, Oral, Q6H PRN **OR** acetaminophen (TYLENOL) suppository 650 mg, 650 mg, Rectal, Q6H PRN  glucose (GLUTOSE) 40 % oral gel 15 g, 15 g, Oral, PRN  dextrose 50 % IV solution, 12.5 g, IntraVENous, PRN  glucagon (rDNA) injection 1 mg, 1 mg, IntraMUSCular, PRN  dextrose 5 % solution, 100 mL/hr, IntraVENous, PRN  amitriptyline (ELAVIL) tablet 100 mg, 100 mg, Oral, Nightly  aspirin EC tablet 81 mg, 81 mg, Oral, Daily  atorvastatin (LIPITOR) tablet 20 mg, 20 mg, Oral, Nightly  apixaban (ELIQUIS) tablet 5 mg, 5 mg, Oral, BID  escitalopram (LEXAPRO) tablet 10 mg, 10 mg, Oral, Daily  gabapentin (NEURONTIN) capsule 400 mg, 400 mg, Oral, BID  miconazole (MICOTIN) 2 % powder, , Topical, BID  pantoprazole (PROTONIX) tablet 40 mg, 40 mg, Oral, Daily  meropenem (MERREM) 1,000 mg in sodium chloride 0.9 % 100 mL IVPB (mini-bag), 1,000 mg, IntraVENous, 2 times per day  insulin lispro (1 Unit Dial) 0-6 Units, 0-6 Units, SubCUTAneous, TID WC  insulin lispro (1 Unit Dial) 0-3 Units, 0-3 Units, SubCUTAneous, Nightly  insulin glargine (LANTUS;BASAGLAR) injection pen 10 Units, 10 Units, SubCUTAneous, Daily  vancomycin (VANCOCIN) 750 mg in dextrose 5 % 250 mL IVPB, 750 mg, IntraVENous, Q24H  Allergies:   Sulfa antibiotics  Social History:    TOBACCO:   reports that she quit smoking about 3 years ago. Her smoking use included cigarettes. She has a 30.00 pack-year smoking history. She has never used smokeless tobacco.  Family History:   History reviewed. No pertinent family history. REVIEW OF SYSTEMS:   A 10 point review of systems was conducted, significant findings as noted in HPI. All other systems negative. PHYSICAL EXAM:      Vitals:    BP (!) 184/76   Pulse 83   Temp 98 °F (36.7 °C) (Oral)   Resp 18   Ht 5' (1.524 m)   Wt 220 lb 6.4 oz (100 kg)   LMP 10/23/2015   SpO2 91%   BMI 43.04 kg/m²     LABS:   Recent Labs     10/25/21  0529 10/26/21  0457   WBC 7.5 8.0   HGB 7.3* 7.6*   HCT 21.8* 23.1*    381     Recent Labs     10/26/21  0457      K 4.7      CO2 30   BUN 36*   CREATININE 1.7*     Recent Labs     10/23/21  1938 10/23/21  2003 10/25/21  0529   PROT 6.3*  --  5.7*   INR  --  1.34*  --        VASCULAR:   - DP and PT pulses are nonpalpable  b/l. - Upon hand-held Doppler biphasic signals noted to DP and PT pulses B/L LE.  - CFT is brisk to the dorsal and plantar TMA stump site right foot. - CFT is brisk to the remaining distal digits of the foot left foot. - Skin temperature is warm to cool from proximal to distal with no focal calor noted. - Nonpitting edema noted b/l.   - No pain with calf compression b/l. NEUROLOGIC:   - Gross and epicritic sensation is diminished b/l.   - Protective sensation is diminished at all pedal sites b/l.     DERMATOLOGIC:   Right lower extremity:  #1  Lateral aspect 5 cm linear well approximated skin incision with intact sutures.  -No dehiscence or drainage noted.  -No acute signs of infection and clinically stable. #2 full-thickness ulceration cuboid region plantar aspect.  -Wound measures approximately 4.0 x 3.9 x 3.0 cm.  -Wound base granular and fibrotic tissue with epithelialized borders.  -Wound does not probe to bone, tunnels, or tracks.  -No fluctuance, crepitus, malodor, or drainage noted.  -No periwound erythema or acute signs of infection noted. Picture taken 10/26/2021         Left lower extremity:  #1 lateral aspect left foot  -10 cm linear surgical incision with well coapted skin edges.  -Diffuse xerotic skin with no dehiscence or acute signs infection. Picture taken 10/26/2021       #2 full-thickness plantar aspect fourth metatarsal head  -Wound measures approximately 2.0 x 1.1 x 0.1 cm  -Wound base fibrotic in nature with epithelialized borders.  -Wound does not probe to bone, tunnels, or tracks.  -No fluctuance, crepitus, malodor, or drainage noted.  -Wound clinically stable without acute signs of infection noted. Picture taken 10/26/2021    Patient gave verbal consent for the picture taken at today's visit. MUSCULOSKELETAL:   - Muscle strength is 4/5 for all pedal groups tested. - No pain with palpation of the foot or ankle b/l. - Ankle joint ROM is decreased in dorsiflexion with the knee extended.    - Hx of hallux amputation and fifth ray resection left foot  - Hx of transmetatarsal amputation right foot      IMPRESSION/RECOMMENDATIONS:    -Full-thickness ulceration Carpenter 2; plantar cuboid right foot  -Full-thickness ulceration Carpenter 1; subfourth metatarsal left foot  -S/P I&D, partial cuboid resection of right foot, I&D with [facility] left foot (10/13/21)  -PVD; b/l LE  -Lymphedema; b/l LE  -Type 2 diabetes mellitus with peripheral neuropathy  -History of osteomyelitis; b/l LE  -History of noncompliance with weight bearing status      -Patient seen and examined at bedside this a.m.  -Hypertensive, afebrile, no leukocytosis noted.  -Albumin 2.2; ordered nutrient supplements  -No imaging obtained 2/2 low concern of foot infection and well known chronic osteomyelitis from prior imaging.  -Wound culture not obtained at this time 2/2 no active drainage and would only colonized skin sukhjinder.  -Would continue IV vancomycin and meropenem per Dr. Henry Rodriguez  -Right lower extremity dressed with wet-to-dry gauze, DSD, and Ace bandage.  -Will apply wound VAC to right lower extremity tomorrow a.m.  -Left lower extremity dressed with Adaptic, DSD, and Ace bandage.  -Post-op shoe ordered to patient room  -None weightbearing to bilateral LE. Can pivot as needed to transfer to bathroom or bed. DISPO: Full-thickness ulcerations subcuboid right foot and plantar aspects of fourth metatarsal clinically stable and without acute signs infection. Please continue IV vancomycin and meropenem. No surgical intervention and okay for discharge from podiatry pending medically stable.   Patient can follow-up with Dr. Carols Eduardo Jacobs at the podiatry clinic 1 week after being discharged from the hospital.      -Discussed assessment and plan with Dr. Jeromy Moss, DPM   Podiatric Resident, PGY-3  Pager: [phone] or Perfect serve

## 2021-10-26 NOTE — CONSULTS
General Surgery   Resident Consult Note    Reason for consult: RUQ tenderness, biliary sludge    Chief Complaint: AMS    History obtained from: patient/EMR    History of Present Illness :    Lachelle Duran is a 60 y.o. female with history of COPD, RASHMI, diastolic HF with EF 60%, DVT in , bilateral lower foot wounds w/ multiple partial amputations, osteomyelitis of  B/L lower extremities currently treated with Vancomycin and Merrem, CKD, DM, who presented to ED for AMS and for whom general surgery is consulted for RUQ pain. Patient had recently been hospitalized multiple times over the last month, on 10/16 for osteomyelitis, on 10/18 after being found unresponsive after methadone dose of 150mg and found to be hypoglycemic. Patient is taking Eliquis and ASA. History is very limited due to difficulty arousing patient after having taken Methadone. When awake, patient endorses having abdominal pain that she reports is in the RUQ but also states is diffuse. Patient has been tolerating full diet.     Past Medical History:        Diagnosis Date    Asthma 2004    Bacterial vaginosis 2008    Carpal tunnel syndrome 2007    COPD (chronic obstructive pulmonary disease) ([address]     Diabetes mellitus type II 2007    10/1/20 pt states does accucheck 2x/day at home    Diabetic neuropathy ([address] 9323    Diastolic CHF ([address]     DVT (deep venous thrombosis) ([address] 2004    Dyslipidemia 2009    Dyspareunia 2009    ETOH abuse 2007    HTN (hypertension)     Hx of blood clots     Hyperlipidemia     MRSA (methicillin resistant staph aureus) culture positive 2017; 2017    foot; leg     Neuropathy 2009    polyneuropathy    Pancreatitis 2004    Tobacco abuse 2008    Uses wheelchair     also uses walker       Past Surgical History:           Procedure Laterality Date     SECTION  unknown    FOOT DEBRIDEMENT Right 2019    INCISION AND DRAINAGE WITH APPLICATION cryotherapy done on lesion    TOE AMPUTATION Left 02/24/2017    AMPUTATION LEFT GREAT TOE                 TONSILLECTOMY         Allergies:  Sulfa antibiotics    Medications:   Home Meds  No current facility-administered medications on file prior to encounter. Current Outpatient Medications on File Prior to Encounter   Medication Sig Dispense Refill    glucagon 1 MG injection Inject 1 kit into the muscle as needed (low blood sugar)      aspirin 81 MG EC tablet Take 81 mg by mouth daily      omeprazole (PRILOSEC) 20 MG delayed release capsule Take 20 mg by mouth daily      insulin glargine (LANTUS SOLOSTAR) 100 UNIT/ML injection pen Inject 25 Units into the skin nightly 5 pen 0    ammonium lactate (LAC-HYDRIN) 12 % lotion Apply topically daily Apply to both legs daily      insulin aspart (NOVOLOG FLEXPEN) 100 UNIT/ML injection pen Inject 8 Units into the skin 3 times daily (before meals)      vancomycin (VANCOCIN) 750 MG injection Infuse 750 mg intravenously daily      meropenem (MERREM) 1 g injection Inject 1,000 mg into the muscle every 12 hours      hydrALAZINE (APRESOLINE) 50 MG tablet Take 1 tablet by mouth every 8 hours 90 tablet 3    metoprolol succinate (TOPROL XL) 50 MG extended release tablet Take 1 tablet by mouth daily 30 tablet 3    furosemide (LASIX) 40 MG tablet Take 1 tablet by mouth daily 30 tablet 0    gabapentin (NEURONTIN) 400 MG capsule Take 1 capsule by mouth 2 times daily for 90 days. 180 capsule 0    ELIQUIS 5 MG TABS tablet TAKE 1 TABLET BY MOUTH TWICE DAILY 180 tablet 1    escitalopram (LEXAPRO) 10 MG tablet Take 1 tablet by mouth daily 30 tablet 2    methadone (DOLOPHINE) 10 MG/ML solution Take 140 mg by mouth daily.  Verified dose with [facility] ([phone]) 6/18/21      amitriptyline (ELAVIL) 100 MG tablet TAKE 1 TABLET BY MOUTH EVERY NIGHT AT BEDTIME 30 tablet 2    atorvastatin (LIPITOR) 20 MG tablet Take 1 tablet by mouth nightly 90 tablet 1    nystatin (MYCOSTATIN) 260228 UNIT/GM powder Apply topically 2 times daily Apply to right breast and groin area BID 30 g 3    blood glucose test strips (TRUE METRIX BLOOD GLUCOSE TEST) strip 1 each by In Vitro route 2 times daily As needed.  200 each 5    Insulin Pen Needle 31G X 5 MM MISC 1 each by Does not apply route daily 100 each 5    Lancets MISC 1 each by Does not apply route 2 times daily PHARMACY MAY SUBSTITUTE TO TRUE METRIX LANCETS 100 each 5    Gauze Pads & Dressings MISC Please dispense 4x8 guaze, kerlix, and ace 1 each 2     Current Meds  metoprolol tartrate (LOPRESSOR) tablet 25 mg, BID  methadone (DOLOPHINE) 10 MG/ML solution 75 mg, BID  furosemide (LASIX) injection 40 mg, BID  sodium chloride flush 0.9 % injection 5-40 mL, 2 times per day  sodium chloride flush 0.9 % injection 5-40 mL, PRN  0.9 % sodium chloride infusion, PRN  ondansetron (ZOFRAN-ODT) disintegrating tablet 4 mg, Q8H PRN   Or  ondansetron (ZOFRAN) injection 4 mg, Q6H PRN  polyethylene glycol (GLYCOLAX) packet 17 g, Daily PRN  acetaminophen (TYLENOL) tablet 650 mg, Q6H PRN   Or  acetaminophen (TYLENOL) suppository 650 mg, Q6H PRN  glucose (GLUTOSE) 40 % oral gel 15 g, PRN  dextrose 50 % IV solution, PRN  glucagon (rDNA) injection 1 mg, PRN  dextrose 5 % solution, PRN  amitriptyline (ELAVIL) tablet 100 mg, Nightly  aspirin EC tablet 81 mg, Daily  atorvastatin (LIPITOR) tablet 20 mg, Nightly  apixaban (ELIQUIS) tablet 5 mg, BID  escitalopram (LEXAPRO) tablet 10 mg, Daily  gabapentin (NEURONTIN) capsule 400 mg, BID  miconazole (MICOTIN) 2 % powder, BID  pantoprazole (PROTONIX) tablet 40 mg, Daily  meropenem (MERREM) 1,000 mg in sodium chloride 0.9 % 100 mL IVPB (mini-bag), 2 times per day  insulin lispro (1 Unit Dial) 0-6 Units, TID WC  insulin lispro (1 Unit Dial) 0-3 Units, Nightly  insulin glargine (LANTUS;BASAGLAR) injection pen 10 Units, Daily  vancomycin (VANCOCIN) 750 mg in dextrose 5 % 250 mL IVPB, Q24H        Family History: History reviewed. No pertinent family history. Social History:   TOBACCO:   reports that she quit smoking about 3 years ago. Her smoking use included cigarettes. She has a 30.00 pack-year smoking history. She has never used smokeless tobacco.  ETOH:   reports no history of alcohol use. DRUGS:   reports no history of drug use. Review of Systems:   A 14 point review of systems was conducted, significant findings as noted in HPI      Physical exam:    Vitals:    10/26/21 0005 10/26/21 0425 10/26/21 0448 10/26/21 0821   BP:   (!) 184/61 (!) 184/76   Pulse:   89 83   Resp: 16 18 18 18   Temp:   98.1 °F (36.7 °C) 98 °F (36.7 °C)   TempSrc:   Axillary Oral   SpO2: 96% 95%  91%   Weight:   220 lb 6.4 oz (100 kg)    Height:           General appearance: drowsy; difficult to arouse  Neuro: arousable to pain, no focal deficits  Chest/Lungs: normal respiratory effort, on 3LNC  Cardiovascular: RRR  Abdomen: morbidly obese soft, mildly tender to palpation in RUQ and RLQ, non-distended, no guarding/rigidity  Skin: warm and dry  Extremities:B/L lower extremities with ACE bandage wrapping    Labs:    CBC:   Recent Labs     10/23/21  1938 10/25/21  0529 10/26/21  0457   WBC 9.2 7.5 8.0   HGB 8.0* 7.3* 7.6*   HCT 23.7* 21.8* 23.1*   MCV 86.5 85.9 86.4    343 381     BMP:   Recent Labs     10/24/21  0346 10/24/21  0347 10/25/21  0529 10/26/21  0457     --  140 140   K 5.1 5.2* 4.7 4.7     --  104 102   CO2 26  --  30 30   BUN 48*  --  40* 36*   CREATININE 1.8*  --  1.6* 1.7*     PT/INR:   Recent Labs     10/23/21  2003   PROTIME 15.3*   INR 1.34*     APTT: No results for input(s): APTT in the last 72 hours.   Liver Profile:  Lab Results   Component Value Date    AST 20 10/25/2021    ALT 20 10/25/2021    BILIDIR <0.2 10/25/2021    BILITOT <0.2 10/25/2021    ALKPHOS 186 10/25/2021     06/09/2019     Lab Results   Component Value Date    CHOL 135 08/12/2021    HDL 52 08/12/2021    TRIG 119 08/12/2021 UA:   Lab Results   Component Value Date    COLORU Yellow 10/23/2021    PHUR 6.0 10/23/2021    WBCUA None seen 10/23/2021    RBCUA 0-2 10/23/2021    MUCUS 1+ 04/29/2014    YEAST Present 10/18/2021    BACTERIA Rare 10/23/2021    CLARITYU Clear 10/23/2021    SPECGRAV 1.020 10/23/2021    LEUKOCYTESUR Negative 10/23/2021    UROBILINOGEN 0.2 10/23/2021    BILIRUBINUR Negative 10/23/2021    BLOODU Negative 10/23/2021    GLUCOSEU 100 10/23/2021       Imaging:   US ABDOMEN LIMITED   Final Result   IMPRESSION :      Gallbladder sludge without evidence of acute cholecystitis. Mildly coarsened hepatic echotexture. Correlate with liver function tests. FL MODIFIED BARIUM SWALLOW W VIDEO   Final Result      1. Weak swallowing reflex with delayed AP transit of the bolus. 2.  No aspiration penetration with the various consistencies attempted. Please see speech pathology report for additional findings and recommendations. CT CHEST WO CONTRAST   Final Result   1. No acute intracranial findings. CHEST:         FINDINGS:      Patchy bilateral groundglass opacity and airspace disease in both upper lobes and to a lesser extent in both lower lobes consistent with pneumonia. Small right pleural effusion. No significant left pleural effusion. Normal heart size. No pericardial    effusion. Vascular aorta and main pulmonary artery are normal in caliber. Right IJ venous catheter tip at the cavoatrial junction. There is diffuse anasarca. Small gallstones are noted in the gallbladder. There is a calcification in the tail the    pancreas measuring 1.1 cm which is stable since previous abdominal CT from June 2021. There is a cystic area in the tail the pancreas measuring 2.4 x 1.2 cm which is also unchanged. On previous chest CT from 2011, the cystic area in the tail the pancreas    measured 2.8 x 1.7 cm. No fractures are identified. IMPRESSION:   1.  Moderate patchy bilateral airspace disease consistent with multifocal pneumonia including potential atypical viral etiologies. Clinical correlation recommended. 2. Trace right pleural effusion. 3. Anasarca. CT Head WO Contrast   Final Result   1. No acute intracranial findings. CHEST:         FINDINGS:      Patchy bilateral groundglass opacity and airspace disease in both upper lobes and to a lesser extent in both lower lobes consistent with pneumonia. Small right pleural effusion. No significant left pleural effusion. Normal heart size. No pericardial    effusion. Vascular aorta and main pulmonary artery are normal in caliber. Right IJ venous catheter tip at the cavoatrial junction. There is diffuse anasarca. Small gallstones are noted in the gallbladder. There is a calcification in the tail the    pancreas measuring 1.1 cm which is stable since previous abdominal CT from June 2021. There is a cystic area in the tail the pancreas measuring 2.4 x 1.2 cm which is also unchanged. On previous chest CT from 2011, the cystic area in the tail the pancreas    measured 2.8 x 1.7 cm. No fractures are identified. IMPRESSION:   1. Moderate patchy bilateral airspace disease consistent with multifocal pneumonia including potential atypical viral etiologies. Clinical correlation recommended. 2. Trace right pleural effusion. 3. Anasarca. XR CHEST PORTABLE   Final Result   1. Stable diffuse bilateral airspace disease. Assessment/Plan:  Remberto Burlseon is a 60 y.o. female with history of COPD, RASHMI, diastolic HF with EF 54%, DVT in 2004, bilateral lower foot wounds w/ multiple partial amputations, osteomyelitis of  B/L lower extremities currently treated with Vancomycin and Merrem, CKD, DM, who presented to ED for AMS and for whom general surgery is consulted for RUQ pain. Patient is hypertensive but otherwise afebrile and HDS. Labs are significant for elevated Alk Phos of 186; bilirubin, AST, ALT, lipase, and WBC are WNL.  On physical

## 2021-10-26 NOTE — CARE COORDINATION
CM  following for  D/c planning :  patient plans to d/c to SNF at  D//c < will have Wound vac and  PICC w/ IV atbx >   patient has been accepted at  [facility] ; Following up with [facility]. as well    Patient was admitted from [facility] requesting diff SNF at  D/C . General Surgery following   Podiatry consulted  : Will apply wound VAC to right lower extremity tomorrow a.m    Electronically signed by Santos Lacey RN on 10/26/2021 at 5:39 PM       Santos Lacey RN Case Manager  [facility]  [address] [place] [address].   [facility] [address] [phone]  Fax [phone]

## 2021-10-26 NOTE — PROGRESS NOTES
Progress Note    Admit Date: 10/23/2021  Day: 4  Diet: Diet NPO    CC: AMS    Interval history: NAEO, Pt more lethargic this morning. She slept with BIPAP last night. Saturating appropriately on 3L O2. She cont to complain of RUQ pain. Pt is very slow to answer questions. Has some SOB, slightly improved Denies C/p. HPI:   Tino Mullins is a 59 y/o F w/ hx if RASHMI, COPD, diastolic HF, DVT (1558), bilateral lower foot wounds w/ partial amputations, osteomyelitis on vancomycin and meropenem, CKD, DM who presents w/ AMS. EMS was called by the patient's daughters due to concern about waxing and waning mental status. The patient was recently discharged on 10/16 during which she was diagnosed w/ osteomyelitis of feet and given would care, multiple I&D, and antibiotic therapy. She was then discharged to a skilled nursing facility. She was readmitted on 10/18 due to being found unresponsive after receiving 140 mg of methadone in the morning and she was also found to have Bg of 30 and was given glucagon at that time. Patient was started on D50 and IVF and podiatry performed an I&D w/ partial cuboid resection. The patient was discharged on 10/20 after mental status returned to A&OX4. Tonight, in the emergency department the patient was alert to name only. The patient was hypoxic on room air and was responsive to 4L nasal cannula. Per EMR the patient's baseline is 2-3 liters. The patient's blood glucose was found to be 65, K was 5.2 (no EKG changes), and Creatine was 1.9 (baseline 1.4-1.7). The patient was admitted to the medicine floor for further workup and care.      Medications:     Scheduled Meds:   methadone  120 mg Oral Daily    furosemide  40 mg IntraVENous BID    sodium chloride flush  5-40 mL IntraVENous 2 times per day    amitriptyline  100 mg Oral Nightly    aspirin  81 mg Oral Daily    atorvastatin  20 mg Oral Nightly    apixaban  5 mg Oral BID    escitalopram  10 mg Oral Daily    gabapentin  400 mg Oral BID    miconazole   Topical BID    pantoprazole  40 mg Oral Daily    meropenem (MERREM) 1000 mg IVPB (mini-bag)  1,000 mg IntraVENous 2 times per day    insulin lispro  0-6 Units SubCUTAneous TID WC    insulin lispro  0-3 Units SubCUTAneous Nightly    insulin glargine  10 Units SubCUTAneous Daily    vancomycin  750 mg IntraVENous Q24H     Continuous Infusions:   sodium chloride      dextrose       PRN Meds:sodium chloride flush, sodium chloride, ondansetron **OR** ondansetron, polyethylene glycol, acetaminophen **OR** acetaminophen, glucose, dextrose, glucagon (rDNA), dextrose    Objective:   Vitals:   T-max:  Patient Vitals for the past 8 hrs:   BP Temp Temp src Pulse Resp SpO2 Weight   10/26/21 0448 (!) 184/61 98.1 °F (36.7 °C) Axillary 89 18  220 lb 6.4 oz (100 kg)   10/26/21 0425     18 95 %    10/26/21 0005     16 96 %        Intake/Output Summary (Last 24 hours) at 10/26/2021 0558  Last data filed at 10/25/2021 1859  Gross per 24 hour   Intake 10 ml   Output 800 ml   Net -790 ml       Review of Systems   All other systems reviewed and are negative. Physical Exam  Constitutional:       Appearance: She is ill-appearing. HENT:      Head: Normocephalic and atraumatic. Eyes:      Extraocular Movements: Extraocular movements intact. Conjunctiva/sclera: Conjunctivae normal.   Cardiovascular:      Rate and Rhythm: Normal rate and regular rhythm. Pulmonary:      Effort: Pulmonary effort is normal.      Breath sounds: Rales present. Comments: improved  Abdominal:      General: Abdomen is flat. Palpations: Abdomen is soft. There is hepatomegaly. Tenderness: There is abdominal tenderness. Comments: RUQ pain   Musculoskeletal:         General: Normal range of motion. Cervical back: Normal range of motion. Right lower leg: Edema present. Left lower leg: Edema present. Skin:     General: Skin is warm and dry.    Neurological:      General: No focal deficit present. Mental Status: She is alert. Psychiatric:         Mood and Affect: Mood normal.         LABS:    CBC:   Recent Labs     10/23/21  1938 10/25/21  0529 10/26/21  0457   WBC 9.2 7.5 8.0   HGB 8.0* 7.3* 7.6*   HCT 23.7* 21.8* 23.1*    343 381   MCV 86.5 85.9 86.4     Renal:    Recent Labs     10/24/21  0346 10/24/21  0347 10/25/21  0529 10/26/21  0457     --  140 140   K 5.1 5.2* 4.7 4.7     --  104 102   CO2 26  --  30 30   BUN 48*  --  40* 36*   CREATININE 1.8*  --  1.6* 1.7*   GLUCOSE 186*  --  132* 139*   CALCIUM 8.5  --  8.6 9.0   ANIONGAP 8  --  6 8     Hepatic:   Recent Labs     10/23/21  1938 10/25/21  0529   AST 22 20   ALT 21 20   BILITOT <0.2 <0.2   BILIDIR  --  <0.2   PROT 6.3* 5.7*   LABALBU 2.3* 2.2*   ALKPHOS 208* 186*     Troponin:   Recent Labs     10/24/21  0851 10/24/21  2141 10/25/21  0231   TROPONINI 0.05* 0.03* 0.03*     BNP: No results for input(s): BNP in the last 72 hours. Lipids: No results for input(s): CHOL, HDL in the last 72 hours. Invalid input(s): LDLCALCU, TRIGLYCERIDE  ABGs:  No results for input(s): PHART, MHD4GUI, PO2ART, BYF7JYP, BEART, THGBART, H9HRCJMW, DQN2ZZH in the last 72 hours. INR:   Recent Labs     10/23/21  2003   INR 1.34*     Lactate: No results for input(s): LACTATE in the last 72 hours. Cultures:  -----------------------------------------------------------------  RAD:   FL MODIFIED BARIUM SWALLOW W VIDEO   Final Result      1. Weak swallowing reflex with delayed AP transit of the bolus. 2.  No aspiration penetration with the various consistencies attempted. Please see speech pathology report for additional findings and recommendations. CT CHEST WO CONTRAST   Final Result   1. No acute intracranial findings. CHEST:         FINDINGS:      Patchy bilateral groundglass opacity and airspace disease in both upper lobes and to a lesser extent in both lower lobes consistent with pneumonia.  Small right pleural effusion. No significant left pleural effusion. Normal heart size. No pericardial    effusion. Vascular aorta and main pulmonary artery are normal in caliber. Right IJ venous catheter tip at the cavoatrial junction. There is diffuse anasarca. Small gallstones are noted in the gallbladder. There is a calcification in the tail the    pancreas measuring 1.1 cm which is stable since previous abdominal CT from June 2021. There is a cystic area in the tail the pancreas measuring 2.4 x 1.2 cm which is also unchanged. On previous chest CT from 2011, the cystic area in the tail the pancreas    measured 2.8 x 1.7 cm. No fractures are identified. IMPRESSION:   1. Moderate patchy bilateral airspace disease consistent with multifocal pneumonia including potential atypical viral etiologies. Clinical correlation recommended. 2. Trace right pleural effusion. 3. Anasarca. CT Head WO Contrast   Final Result   1. No acute intracranial findings. CHEST:         FINDINGS:      Patchy bilateral groundglass opacity and airspace disease in both upper lobes and to a lesser extent in both lower lobes consistent with pneumonia. Small right pleural effusion. No significant left pleural effusion. Normal heart size. No pericardial    effusion. Vascular aorta and main pulmonary artery are normal in caliber. Right IJ venous catheter tip at the cavoatrial junction. There is diffuse anasarca. Small gallstones are noted in the gallbladder. There is a calcification in the tail the    pancreas measuring 1.1 cm which is stable since previous abdominal CT from June 2021. There is a cystic area in the tail the pancreas measuring 2.4 x 1.2 cm which is also unchanged. On previous chest CT from 2011, the cystic area in the tail the pancreas    measured 2.8 x 1.7 cm. No fractures are identified. IMPRESSION:   1.  Moderate patchy bilateral airspace disease consistent with multifocal pneumonia including potential atypical viral etiologies. Clinical correlation recommended. 2. Trace right pleural effusion. 3. Anasarca. XR CHEST PORTABLE   Final Result   1. Stable diffuse bilateral airspace disease. US ABDOMEN LIMITED    (Results Pending)       Assessment/Plan:    Ha Yarbrough is a 59 y/o F w/ hx if RASHMI, COPD, diastolic CHF (EF 90%), DVT (2004), bilateral lower foot wounds w/ partial amputations, osteomyelitis on vancomycin and meropenem, CKD, DM who presents w/ AMS. EMS was called by the patient's daughters due to concern about waxing and waning mental status.     Acute metabolic encephalopathy suspected 2/2 methadone vs hypoglycemia  On arrival to ED patient was only alert to her name. On the floor patient was alert and oriented X4 but was somnolent. Arousalable to verbal stimuli. CKD4 may cause decreased renal clearance of methadone  -Patient has previous hospitalization 4 days ago for similar presentation  -continue on half dose Lantus (10U)  -LDSS  -Follow glucose 4x daily  -Restarted home methadone at half dose (70mg), agreed with patient to inc to 120mg     Acute hypoxic respiratory failure 2/2 decreased respiratory drive - improved  Patient has hx of COPD (30 pack year smoking hx). Baseline oxygen 2-3L. Echo 10/21 showed PASP of 60mmHg, EF ~55%, and mild LVH. RR depressed on presentation (as low as 7 breaths per minute).   -Patient was hypoxic on RA at presentation, responsive to 4L on nasal cannula  -Cont home nebulizers, patient received 40 mg furosemide in ED  - currently on 3L, with improved RR  -continues to have diffuse crackles bilaterally, switched from po to IV lasix 40 bid x3 doses     Patchy Bilateral airspace disease possibly 2/2 volume overload  Unlikely bacterial due to continuous treatment w/ Vanc / and Merropenem  -COVID pending  -Echo 2018 had EF of 50-55%  -Received diuretics in ED  -40mg oral lasix  -strict I's and O's  -lasix as above     RUQ Pain  Positive Leavitt's sign, hepatomegaly, gallstones on CT, hep  States she quit smoking 20 years ago  -Hepatic function panel: elevated alk phos  -RUQ U/S of gallbladder and liver: gallbladder slidge w/o evidence of acute cholecystitis, mildly coarsened hepatic echo texture  -consult surgery    Osteomyelitis  Cult polymicrobial with Ps aerug (R cipro), E coli, E faecalis. Seen by ID previously, IV abx ordered (vanc, merrem) through 11/26.   -Cont Vanc and Meropenem  -Patient's dressings were dry, patient reports dressings were changed on day of presentation  -wound vac draining serosanguinous fluid     Elevated Troponin  -0.05 -> 0.04  -Could be secondary to dehydration, CKD  -downtrending     Chronic problems:  DM2   - continue half dose of lantus, LDSS, hypoglycemia protocol, glucose checks QID    CKD4  - avoid nephrotoxic agents, NSAIDs. Daily weights. Maintain SBP >90.     Moderate PAH  - Echo 10/2021 showed PASP of 60mmHg    Hx of DVT   - continue eliquis    Anxiety/Depression  -continue home amitryptiline, escitalopram    Code Status:FULL  FEN: Adult regular, 3 carb  PPX: eliquis  DISPO: Leslye Sutherland MD, PGY-1  10/26/21  5:58 AM    This patient has been staffed and discussed with Sabino Layton MD.

## 2021-10-26 NOTE — PROGRESS NOTES
Clinical Pharmacy Consult Note    Vancomycin - Management by Pharmacy    Consult Date(s): 10/24/21  Consulting Provider(s): Dr. Corrinne Sierras / Plan  1) Hx of bilateral foot infection / osteomyelitis - Vancomycin + Meropenem   Concurrent Antimicrobials:   o Meropenem - Day #25   Day of Vanc Therapy: #25   Current Dosing Method: AUC   Therapeutic Goal: 400-600 mg/mL*h   o Current Dose / Frequency: 750mg IV q24h  o With level drawn 10/24 AM and improved SCr this AM, kinetic estimates predict an AUC of 528 and trough of 17.8 on this regimen. As kinetics remain stable from prior admissions, will continue with 750mg IV q24h for now.   o Will likely plan to check repeat level in 2-3 days.  Will continue to monitor clinical condition and make adjustments to regimen as appropriate. Please call with questions--  Thanks--  Karin Sears, PharmD, [address]  [id number] ([facility])   10/26/2021 11:07 AM      Subjective/Objective:  Lachelle Duran is a 60 y.o. female with a PMHx significant for obesity, RASHMI, HFrEF, bilateral foot wounds s/p partial amputation, CKD, chronic pain, DM who was recently admitted 10/1-10/16 for bilateral foot wounds, found to have osteomyelitis requiring I&D. Wound cultures grew Pseudomonas, E. Coli and Enterococcus. Pt was discharged on vancomycin and meropenem with recommendations from ID to continue through 11/26. Patient was then admitted 10/18-10/20 with AMS thought to be 2/2 hypoglycemia. Insulin was adjusted, and pt was discharged back to SNF. Pt now represents with AMS thought to be 2/2 hypoglycemia and possible accumulation of Methadone in setting of CKD 4. Pharmacy has been consulted to dose vancomycin. Pertinent Antimicrobials:  Meropenem 1000 mg IV q12h (10/2-current)   Vancomycin - pharmacy to dose (10/2-current)   750 mg IV q24h (10/15-current)    Of note, known to clinical pharmacy team from earlier two admissions in Oct 2021.  Pt was eventually scheduled on vancomycin 750 mg IV q24h with plans to continue through 11/26/21. Regimen predicted AUC of 503 and trough 16.1 as of 10/19/21. Ht Readings from Last 1 Encounters:   10/25/21 5' (1.524 m)      Wt Readings from Last 1 Encounters:   10/26/21 220 lb 6.4 oz (100 kg)     Vancomycin Level(s) / Doses:  Date Time Dose Level / Type of Level Interpretation   10/19 0630 750 mg IV q24h Random = 12.3 mcg/mL Drawn ~23h after previous dose. Predicted AUC = 503 & steady state trough + 16.1.   10/24 0851 750mg IV q24h Random = 18.7 mcg/mL Drawn ~24h after previous dose. Predicted  & steady state trough = 17.8. Note: Serum levels collected for AUC-based dosing may be high if collected in close proximity to the dose administered. This is not necessarily an indicator of toxicity. Recent Labs     10/23/21  1938 10/23/21  1938 10/24/21  0346 10/25/21  0529 10/26/21  0457   CREATININE 1.9*   < > 1.8* 1.6* 1.7*   BUN 49*   < > 48* 40* 36*   WBC 9.2  --   --  7.5 8.0    < > = values in this interval not displayed. Estimated Creatinine Clearance: 38 mL/min (A) (based on SCr of 1.7 mg/dL (H)).     Cultures & Sensitivities:  Date Site Micro Susceptibility / Result   10/24 Blood x2 No growth to date    10/14 Rapid flu negative

## 2021-10-27 NOTE — PROGRESS NOTES
Podiatric Surgery Daily Progress Note      Admit Date: 10/23/2021                           Code:Full Code    Patient seen and examined, labs and records reviewed    Subjective:     Patient seen and examined at bedside this AM. Patient denies f/c/n/v/sob/cp. Patient was alert and more orientated after being Narcan at 748 this a.m. Patient was calling out the name \" Marquis Donohue help me\" when she was agitated. Patient was more pleasant during today's physical examination. Patient has no other pedal complaints during today's examination. Objective     BP (!) 113/57   Pulse 58   Temp 97.9 °F (36.6 °C) (Axillary)   Resp 10   Ht 5' (1.524 m)   Wt 209 lb 7 oz (95 kg)   LMP 10/23/2015   SpO2 93%   BMI 40.90 kg/m²      I/O:    Intake/Output Summary (Last 24 hours) at 10/27/2021 0703  Last data filed at 10/27/2021 7304  Gross per 24 hour   Intake 1536.57 ml   Output 2250 ml   Net -713.43 ml              Wt Readings from Last 3 Encounters:   10/27/21 209 lb 7 oz (95 kg)   10/20/21 223 lb 1.7 oz (101.2 kg)   10/15/21 228 lb 2.8 oz (103.5 kg)       LABS:    Recent Labs     10/25/21  0529 10/26/21  0457   WBC 7.5 8.0   HGB 7.3* 7.6*   HCT 21.8* 23.1*    381        Recent Labs     10/26/21  0457      K 4.7      CO2 30   BUN 36*   CREATININE 1.7*        Recent Labs     10/25/21  0529 10/26/21  0457   PROT 5.7* 6.3*           LOWER EXTREMITY EXAMINATION    Dressing to B/L LE intact. No strikethrough drainage noted to the external dressing. No strikethrough drainage noted to the internal dressing layer E/L LE.    VASCULAR:   - DP and PT pulses are nonpalpable  b/l. - Upon hand-held Doppler biphasic signals noted to DP and PT pulses B/L LE.  - CFT less than 3 seconds to the dorsal and plantar TMA stump site right foot. - CFT less than 3 seconds to the remaining distal digits of the foot left foot. - Skin temperature is warm to cool from proximal to distal with no focal calor noted.    - Nonpitting edema noted b/l with slight improvement 2/2 Ace compressive therapy. - No pain with calf compression b/l.     NEUROLOGIC:   - Gross and epicritic sensation is diminished b/l.   - Protective sensation is diminished at all pedal sites b/l.     DERMATOLOGIC:   Right lower extremity:  #1  Lateral aspect 5 cm linear well approximated skin incision with intact sutures.  -No dehiscence or drainage noted. -Stable without acute signs infection or periwound erythema     #2 full-thickness ulceration cuboid region plantar aspect.  -Wound measures approximately 4.0 x 3.9 x 3.0 cm.  -Wound base granular and slightly fibrotic tissue with pink epithelialized borders.  -Wound does not probe to bone, tunnels, or tracks.  -No fluctuance, crepitus, malodor, or drainage noted. -Stable without acute signs infection or periwound erythema          Left lower extremity:  #1 lateral aspect left foot  -10 cm linear surgical incision with well coapted skin edges.  -Diffuse xerotic skin with no dehiscence or acute signs infection.          #2 full-thickness plantar aspect fourth metatarsal head  -Wound measures approximately 2.0 x 1.1 x 0.1 cm  -Wound base fibrotic in nature with epithelialized borders.  -Wound does not probe to bone, tunnels, or tracks.  -No fluctuance, crepitus, malodor, or drainage noted. -Stable without acute signs infection or periwound erythema       MUSCULOSKELETAL:   - Muscle strength is 4/5 for all pedal groups tested. - No pain with palpation of the foot or ankle b/l. - Ankle joint ROM is decreased in dorsiflexion with the knee extended.    - Hx of hallux amputation and fifth ray resection left foot  - Hx of transmetatarsal amputation right foot      ASSESSMENT/PLAN  -Full-thickness ulceration Carpenter 2; plantar cuboid right foot  -Full-thickness ulceration Carpenter 1; subfourth metatarsal left foot  -S/P I&D, partial cuboid resection of right foot, I&D with [facility] of left foot (10/13/21)  -PVD; b/l LE  -Lymphedema;

## 2021-10-27 NOTE — PROGRESS NOTES
Surgery Daily Progress Note      CC: RUQ abdominal pain    SUBJECTIVE:  No acute events overnight. Patient sleeping soundly this morning. Not answering questions this morning due to grogginess. Tolerated diet yesterday. Minimal abdominal pain. ROS:   A 14 point review of systems was conducted, significant findings as noted above. All other systems negative. OBJECTIVE:    PHYSICAL EXAM:  Vitals:    10/27/21 0104 10/27/21 0124 10/27/21 0140 10/27/21 0421   BP:    (!) 99/52   Pulse:    58   Resp:   14 14   Temp:    97.9 °F (36.6 °C)   TempSrc:    Axillary   SpO2:  92% 96% 94%   Weight: 209 lb 7 oz (95 kg)      Height:           General appearance: drowsy; difficult to arouse  Chest/Lungs: normal respiratory effort, on 5LNC  Cardiovascular: RRR  Abdomen: morbidly obese, soft, mildly tender to palpation in RUQ and RLQ, non-distended, no guarding/rigidity  Skin: warm and dry  Extremities:B/L lower extremities with ACE bandage wrapping  Neuro: arousable, no focal deficits      ASSESSMENT & PLAN:   Nicolás Hernandes is a 60 y.o. female with history of COPD, RASHMI, diastolic CHF (EF 11%), previous DVTs (on Eliquis), CKD, and DM who is admitted to [facility] for altered mental status. Our service has been consulted due to concern for RUQ abdominal pain. - Continue to monitor abdominal exams and labs. - No indication for surgical intervention this admission, as patient does not have acute cholecystitis.  - Okay to continue diet. - Further management per medical team.  - At discharge, okay to follow up with general surgery for possible elective cholecystectomy once she recovers from her acute illness.     Papo Merritt MD, PGY-2  10/27/21  6:08 AM  [phone]

## 2021-10-27 NOTE — PROGRESS NOTES
Progress Note    Admit Date: 10/23/2021  Day: 5  Diet: ADULT DIET; Easy to Chew; 3 carb choices (45 gm/meal)  ADULT ORAL NUTRITION SUPPLEMENT; Breakfast, Lunch, Dinner; Low Calorie/High Protein Oral Supplement    CC: AMS    Interval history: NAEO, Pt more lethargic this morning. Pt was narcaned this morning and appropriately responded. She is still complaining or R sided abdominal pain    HPI:   Sunitha Raphael is a 59 y/o F w/ hx if RASHMI, COPD, diastolic HF, DVT (9895), bilateral lower foot wounds w/ partial amputations, osteomyelitis on vancomycin and meropenem, CKD, DM who presents w/ AMS. EMS was called by the patient's daughters due to concern about waxing and waning mental status. The patient was recently discharged on 10/16 during which she was diagnosed w/ osteomyelitis of feet and given would care, multiple I&D, and antibiotic therapy. She was then discharged to a skilled nursing facility. She was readmitted on 10/18 due to being found unresponsive after receiving 140 mg of methadone in the morning and she was also found to have Bg of 30 and was given glucagon at that time. Patient was started on D50 and IVF and podiatry performed an I&D w/ partial cuboid resection. The patient was discharged on 10/20 after mental status returned to A&OX4. Tonight, in the emergency department the patient was alert to name only. The patient was hypoxic on room air and was responsive to 4L nasal cannula. Per EMR the patient's baseline is 2-3 liters. The patient's blood glucose was found to be 65, K was 5.2 (no EKG changes), and Creatine was 1.9 (baseline 1.4-1.7). The patient was admitted to the medicine floor for further workup and care.      Medications:     Scheduled Meds:   metoprolol tartrate  25 mg Oral BID    [Held by provider] methadone  75 mg Oral BID    sodium chloride flush  5-40 mL IntraVENous 2 times per day    amitriptyline  100 mg Oral Nightly    aspirin  81 mg Oral Daily    atorvastatin  20 mg Oral Nightly    apixaban  5 mg Oral BID    escitalopram  10 mg Oral Daily    gabapentin  400 mg Oral BID    miconazole   Topical BID    pantoprazole  40 mg Oral Daily    meropenem (MERREM) 1000 mg IVPB (mini-bag)  1,000 mg IntraVENous 2 times per day    insulin lispro  0-6 Units SubCUTAneous TID WC    insulin lispro  0-3 Units SubCUTAneous Nightly    insulin glargine  10 Units SubCUTAneous Daily    vancomycin  750 mg IntraVENous Q24H     Continuous Infusions:   sodium chloride 5 mL/hr at 10/27/21 0628    dextrose       PRN Meds:naloxone, sodium chloride flush, sodium chloride, ondansetron **OR** ondansetron, polyethylene glycol, acetaminophen **OR** acetaminophen, glucose, dextrose, glucagon (rDNA), dextrose    Objective:   Vitals:   T-max:  Patient Vitals for the past 8 hrs:   BP Temp Temp src Pulse Resp SpO2 Weight   10/27/21 0754      97 %    10/27/21 0627 (!) 113/57    10     10/27/21 0625     14 93 %    10/27/21 0421 (!) 99/52 97.9 °F (36.6 °C) Axillary 58 14 94 %    10/27/21 0140     14 96 %    10/27/21 0124      92 %    10/27/21 0104       209 lb 7 oz (95 kg)       Intake/Output Summary (Last 24 hours) at 10/27/2021 1689  Last data filed at 10/27/2021 5703  Gross per 24 hour   Intake 1536.57 ml   Output 2250 ml   Net -713.43 ml       Review of Systems   All other systems reviewed and are negative. Physical Exam  Constitutional:       Appearance: She is ill-appearing. HENT:      Head: Normocephalic and atraumatic. Eyes:      Extraocular Movements: Extraocular movements intact. Conjunctiva/sclera: Conjunctivae normal.   Cardiovascular:      Rate and Rhythm: Normal rate and regular rhythm. Pulmonary:      Effort: Pulmonary effort is normal.      Breath sounds: Rales present. Comments: improved  Abdominal:      General: Abdomen is flat. Palpations: Abdomen is soft. There is hepatomegaly. Tenderness: There is abdominal tenderness.       Comments: RUQ pain Musculoskeletal:         General: Normal range of motion. Cervical back: Normal range of motion. Right lower leg: Edema present. Left lower leg: Edema present. Skin:     General: Skin is warm and dry. Neurological:      General: No focal deficit present. Mental Status: She is alert. Psychiatric:         Mood and Affect: Mood normal.         LABS:    CBC:   Recent Labs     10/25/21  0529 10/26/21  0457   WBC 7.5 8.0   HGB 7.3* 7.6*   HCT 21.8* 23.1*    381   MCV 85.9 86.4     Renal:    Recent Labs     10/25/21  0529 10/26/21  0457    140   K 4.7 4.7    102   CO2 30 30   BUN 40* 36*   CREATININE 1.6* 1.7*   GLUCOSE 132* 139*   CALCIUM 8.6 9.0   ANIONGAP 6 8     Hepatic:   Recent Labs     10/25/21  0529 10/26/21  0457   AST 20 20   ALT 20 20   BILITOT <0.2 <0.2   BILIDIR <0.2 <0.2   PROT 5.7* 6.3*   LABALBU 2.2* 2.4*   ALKPHOS 186* 182*     Troponin:   Recent Labs     10/24/21  0851 10/24/21  2141 10/25/21  0231   TROPONINI 0.05* 0.03* 0.03*     BNP: No results for input(s): BNP in the last 72 hours. Lipids: No results for input(s): CHOL, HDL in the last 72 hours. Invalid input(s): LDLCALCU, TRIGLYCERIDE  ABGs:    Recent Labs     10/26/21  1240 10/26/21  1333 10/27/21  0625   PHART 7.364 7.313* 7.367   PLJ5GRO 59.0* 67.6* 60.9*   PO2ART 46.5* 80.1 64.3*   HVY8SHS 34* 34* 35*   BEART 7.5* 7.0* 8.6*   H0MCIGNV 80* 95 91*   QHR7ZCA 35 36 37       INR:   No results for input(s): INR in the last 72 hours. Lactate: No results for input(s): LACTATE in the last 72 hours. Cultures:  -----------------------------------------------------------------  RAD:   US ABDOMEN LIMITED   Final Result   IMPRESSION :      Gallbladder sludge without evidence of acute cholecystitis. Mildly coarsened hepatic echotexture. Correlate with liver function tests. FL MODIFIED BARIUM SWALLOW W VIDEO   Final Result      1. Weak swallowing reflex with delayed AP transit of the bolus. 2.  No aspiration penetration with the various consistencies attempted. Please see speech pathology report for additional findings and recommendations. CT CHEST WO CONTRAST   Final Result   1. No acute intracranial findings. CHEST:         FINDINGS:      Patchy bilateral groundglass opacity and airspace disease in both upper lobes and to a lesser extent in both lower lobes consistent with pneumonia. Small right pleural effusion. No significant left pleural effusion. Normal heart size. No pericardial    effusion. Vascular aorta and main pulmonary artery are normal in caliber. Right IJ venous catheter tip at the cavoatrial junction. There is diffuse anasarca. Small gallstones are noted in the gallbladder. There is a calcification in the tail the    pancreas measuring 1.1 cm which is stable since previous abdominal CT from June 2021. There is a cystic area in the tail the pancreas measuring 2.4 x 1.2 cm which is also unchanged. On previous chest CT from 2011, the cystic area in the tail the pancreas    measured 2.8 x 1.7 cm. No fractures are identified. IMPRESSION:   1. Moderate patchy bilateral airspace disease consistent with multifocal pneumonia including potential atypical viral etiologies. Clinical correlation recommended. 2. Trace right pleural effusion. 3. Anasarca. CT Head WO Contrast   Final Result   1. No acute intracranial findings. CHEST:         FINDINGS:      Patchy bilateral groundglass opacity and airspace disease in both upper lobes and to a lesser extent in both lower lobes consistent with pneumonia. Small right pleural effusion. No significant left pleural effusion. Normal heart size. No pericardial    effusion. Vascular aorta and main pulmonary artery are normal in caliber. Right IJ venous catheter tip at the cavoatrial junction. There is diffuse anasarca. Small gallstones are noted in the gallbladder.  There is a calcification in the tail the    pancreas measuring 1.1 cm which is stable since previous abdominal CT from June 2021. There is a cystic area in the tail the pancreas measuring 2.4 x 1.2 cm which is also unchanged. On previous chest CT from 2011, the cystic area in the tail the pancreas    measured 2.8 x 1.7 cm. No fractures are identified. IMPRESSION:   1. Moderate patchy bilateral airspace disease consistent with multifocal pneumonia including potential atypical viral etiologies. Clinical correlation recommended. 2. Trace right pleural effusion. 3. Anasarca. XR CHEST PORTABLE   Final Result   1. Stable diffuse bilateral airspace disease. Assessment/Plan:    Patric Valderrama is a 59 y/o F w/ hx if RASHMI, COPD, diastolic CHF (EF 69%), DVT (2004), bilateral lower foot wounds w/ partial amputations, osteomyelitis on vancomycin and meropenem, CKD, DM who presents w/ AMS. EMS was called by the patient's daughters due to concern about waxing and waning mental status.     Acute metabolic encephalopathy suspected 2/2 methadone vs hypoglycemia  On arrival to ED patient was only alert to her name. On the floor patient was alert and oriented X4 but was somnolent. Arousalable to verbal stimuli. CKD4 may cause decreased renal clearance of methadone  -Patient has previous hospitalization 4 days ago for similar presentation  -continue on half dose Lantus (10U)--dec to 7 today  -LDSS  -Follow glucose 4x daily  -Restarted home methadone at half dose (70mg), agreed with patient to inc to 120mg, pt lethargic this morning. Will talk to pharmacy for appropriated dosing     Acute hypoxic respiratory failure 2/2 decreased respiratory drive - improved  Patient has hx of COPD (30 pack year smoking hx). Baseline oxygen 2-3L. Echo 10/21 showed PASP of 60mmHg, EF ~55%, and mild LVH. RR depressed on presentation (as low as 7 breaths per minute).   -Patient was hypoxic on RA at presentation, responsive to 4L on nasal cannula  -Cont home nebulizers, patient

## 2021-10-27 NOTE — PROGRESS NOTES
MD made aware of hgb of 6.9 for this patient. HR 71 on telemetry, NSR at time of exam. /58. The patient was found resting comfortably in bed, enjoying dinner with her daughter. The patient endorsed a mild headache and dizziness, no vision changes, N/V/D. No signs of active bleeding. The patient did appear fatigued, with waxing and waning somnolence and trouble completing her sentences however in speaking with the daughter and nurse this is near her baseline. The daughter believes the patient is slightly more somnolent than normal. Pharyngeal pallor is noted. No flank ecchymoses; no other large appreciable ecchymoses suggesting gross blood loss. Type & cross & 1 unit of PRBCs was ordered.     Signed: Cameron Cat MD   PGY-1

## 2021-10-27 NOTE — PROGRESS NOTES
Clinical Pharmacy Progress Note  Medication History     Admit Date: 10/23/2021    List of of current medications patient is taking is complete. Home Medication list in EPIC updated to reflect changes noted below. Source of information:  Medication list from [facility], and conversation with Roly Joy (nurse at facility)    Changes made to medication list:   Medications removed (no longer taking):   · Lantus - discontinued during stay at [facility] sometime between 10/20-10/23 due to hypoglycemia (nurse unable to tell me date of discontinuation)  · Gabapentin - discontinued during stay at [facility] sometime between 10/20-10/23 due to [address] (nurse unable to tell me date of discontinuation)    Medications added:   · Glucagon prn    Medication doses / instructions adjusted:   · none    Please call with questions--  Thanks--  Shaista Bell, PharmD, [address] [facility], [address] ([facility])   10/27/2021 10:31 AM

## 2021-10-27 NOTE — CARE COORDINATION
CM  following for  D/c planning :  patient plans to d/c to SNF at  D//c < will have Wound vac and  PICC w/ IV atbx >   patient has been accepted at  [facility] ; Following up with Marisel Fat[facility]. as well    Patient was admitted from [facility] requesting diff SNF at  D/C . General Surgery following   Podiatry consulted  : Will apply wound VAC to right lower extremity tomorrow a.m    CM spoke with patient and daughter  Ari Sarkis:  [phone]  Re: disposition and they rare agreeable to [facility] :    [address] [place] [address], [address] [place] [address]       Phone: [phone]       Fax: [phone]          Electronically signed by Phill Lopez RN on 10/27/2021 at 2:31 PM       Phill Lopez RN Case Manager  [facility]  [address] [place] [address] Ave. [address] [facility].   [place] [address]  [phone]  Fax [phone]

## 2021-10-27 NOTE — PLAN OF CARE
D; wore bipap overnight, but briefly on evening shift d/t pulling bipap off. Sat on RA last pm dropped to 53% and o2 pnc applied. Pt remembered RN's name from previous admission. Resp rate while sleeping 8-10 with sats 93-94%, but dropping at times to 88% during apnea periods d/t low resp rate. MD was notified overnight about resp status and asked about rechecking ABG this am. ABG was ordered and results called. Medical resident came to bedside this am and ordered Narcan d/t difficulties arousing and low po2 results on ABG. Few minutes after receiving . 4mg of Narcan pt woke up screaming about being in pain. Kept screaming RN's name. RT removed bipap and o2 @ 5lpnc applied. Pt kept screaming and yelling for RN for about 20mins. Finally, calmed down and was more alert and oriented to time, place, and situation. Emotional support and reassurance. Pt verbalized understanding for use of Narcan d/t resp status. Stressed importance of eating and taking supplements to promote healing.    Problem: Gas Exchange - Impaired:  Goal: Levels of oxygenation will improve  Description: Levels of oxygenation will improve  Outcome: Ongoing

## 2021-10-27 NOTE — PROGRESS NOTES
Clinical Pharmacy Consult Note    Vancomycin - Management by Pharmacy    Consult Date(s): 10/24/21  Consulting Provider(s): Dr. Nelsy Plascencia / Plan  1) Hx of bilateral foot infection / osteomyelitis - Vancomycin + Meropenem   Concurrent Antimicrobials:   o Meropenem - Day #26   Day of Vanc Therapy: #26   Current Dosing Method: AUC   Therapeutic Goal: 400-600 mg/mL*h   o Current Dose / Frequency: 750mg IV q24h  o With level drawn 10/24 AM, kinetic estimates predict an AUC of 528 and trough of 17.8 on this regimen. As kinetics remain stable from prior admissions, will continue with 750mg IV q24h for now.   o Will check random level in AM tomorrow to ensure Vancomycin is not accumulating.  Will continue to monitor clinical condition and make adjustments to regimen as appropriate. Please call with questions--  Thanks--  Madhuri Roberts, PharmD, [place], [facility]  [id number] (\[facility]\"")   10/27/2021 2:42 PM      Subjective/Objective:  Amie Rinaldi is a 60 y.o. female with a PMHx significant for obesity, RASHMI, HFrEF, bilateral foot wounds s/p partial amputation, CKD, chronic pain, DM who was recently admitted 10/1-10/16 for bilateral foot wounds, found to have osteomyelitis requiring I&D. Wound cultures grew Pseudomonas, E. Coli and Enterococcus. Pt was discharged on vancomycin and meropenem with recommendations from ID to continue through 11/26. Patient was then admitted 10/18-10/20 with AMS thought to be 2/2 hypoglycemia. Insulin was adjusted, and pt was discharged back to SNF. Pt now represents with AMS thought to be 2/2 hypoglycemia and possible accumulation of Methadone in setting of CKD 4. Pharmacy has been consulted to dose vancomycin. Pertinent Antimicrobials:  Meropenem 1000 mg IV q12h (10/2-current)   Vancomycin - pharmacy to dose (10/2-current)   750 mg IV q24h (10/15-current)    Of note, known to clinical pharmacy team from earlier two admissions in Oct 2021.  Pt was eventually scheduled on vancomycin 750 mg IV q24h with plans to continue through 11/26/21. Regimen predicted AUC of 503 and trough 16.1 as of 10/19/21. Ht Readings from Last 1 Encounters:   10/25/21 5' (1.524 m)      Wt Readings from Last 1 Encounters:   10/27/21 209 lb 7 oz (95 kg)     Vancomycin Level(s) / Doses:  Date Time Dose Level / Type of Level Interpretation   10/19 0630 750 mg IV q24h Random = 12.3 mcg/mL Drawn ~23h after previous dose. Predicted AUC = 503 & steady state trough + 16.1.   10/24 0851 750mg IV q24h Random = 18.7 mcg/mL Drawn ~24h after previous dose. Predicted  & steady state trough = 17.8. Note: Serum levels collected for AUC-based dosing may be high if collected in close proximity to the dose administered. This is not necessarily an indicator of toxicity. Recent Labs     10/25/21  0529 10/26/21  0457 10/27/21  0754   CREATININE 1.6* 1.7* 1.8*   BUN 40* 36* 32*   WBC 7.5 8.0 6.8       Estimated Creatinine Clearance: 35 mL/min (A) (based on SCr of 1.8 mg/dL (H)).     Cultures & Sensitivities:  Date Site Micro Susceptibility / Result   10/24 Blood x2 No growth to date    10/14 Rapid flu negative

## 2021-10-27 NOTE — PLAN OF CARE
Attempted to see patient this a.m. for dressing change. Patient was agitated after receiving Narcan at 748. Internal medicine residents aware and at bedside. Will stop by later on this afternoon for dressing change and wound VAC application.       Rochelle Mora  Podiatry resident, PGY 3  Perfect serve

## 2021-10-27 NOTE — PLAN OF CARE
Problem: Falls - Risk of:  Goal: Will remain free from falls  Description: Will remain free from falls  Outcome: Ongoing   Call light within reach; non skid socks on; bed alarm on  Problem: Pain:  Goal: Pain level will decrease  Description: Pain level will decrease  Outcome: Ongoing   Pt has no complaints of pain  Problem: Skin Integrity:  Goal: Absence of new skin breakdown  Description: Absence of new skin breakdown  Outcome: Ongoing   No signs of new skin breakdown

## 2021-10-28 NOTE — CARE COORDINATION
CM  following for  D/c planning :  patient plans to d/c to SNF at  D/C < will have Wound vac and  PICC w/ IV atbx >   patient has been accepted at  [facility] ;      Patient was admitted from [facility] requesting diff SNF at  D/C . General Surgery following   Podiatry consulted  :  wound VAC to right lower extremity     CM spoke with patient and daughter  Nessa De Los Santos:  [phone]  Re: disposition and they rare agreeable to [facility] :    [address], [address] [place] [address]       Phone: [phone]       Fax: [phone]        CM confirmed  Elvera Eng at  [facility] and are follow , able to accept when medically stable to transfer. Electronically signed by Jessica Murdock RN on 10/28/2021 at 3:44 PM       Jessica Murdock  RN Case Manager  [facility]  [address] [place] [address].   [address] [place] [address]  [phone]  Fax [phone]

## 2021-10-28 NOTE — PROGRESS NOTES
Physical Therapy  Facility/Department: [address]  Daily Treatment Note  NAME: Wilber Clifford  : 1965  MRN: [id number]    Date of Service: 10/28/2021    Discharge Recommendations:  Wilber Clifford scored a 8/24 on the AM-PAC short mobility form. Current research shows that an AM-PAC score of 17 or less is typically not associated with a discharge to the patient's home setting. Based on the patient's AM-PAC score and their current functional mobility deficits, it is recommended that the patient have 3-5 sessions per week of Physical Therapy at d/c to increase the patient's independence. Please see assessment section for further patient specific details. If patient discharges prior to next session this note will serve as a discharge summary. Please see below for the latest assessment towards goals. Assessment   Body structures, Functions, Activity limitations: Decreased functional mobility ; Decreased strength;Decreased balance;Decreased cognition  Assessment: Pt presents with the above deficits after presenting to the hospital with AMS and ansarca. Pt comes from SNF and had multiple falls at facilty per chart. Pt demonstrated improved mobility this date, requiring assist of only one for bed mobility and seated EOB activities. Pt incontinent of urine despite purewick, assisted with pericare and changing bed linens. Progress to OOB with Krystian lift when pt able to tolerate. Pt would benefit from continued inpatient PT inorder to progress towards PLOF and independence. Treatment Diagnosis: decreased functional mobility  Prognosis: Good  Decision Making: Medium Complexity  Clinical Presentation: evolving  Barriers to Learning: cognition  REQUIRES PT FOLLOW UP: Yes  Activity Tolerance  Activity Tolerance: Patient Tolerated treatment well     Patient Diagnosis(es): The primary encounter diagnosis was Acute hypoxemic respiratory failure ([facility] [address]  Diagnoses of Altered mental status, unspecified altered mental status type and Anasarca were also pertinent to this visit. has a past medical history of Asthma, Bacterial vaginosis, Carpal tunnel syndrome, COPD (chronic obstructive pulmonary disease) ([facility] [address] Diabetes mellitus type II, Diabetic neuropathy ([facility] [address] Diastolic CHF ([facility] [address] DVT (deep venous thrombosis) ([facility] [address] Dyslipidemia, Dyspareunia, ETOH abuse, HTN (hypertension), Hx of blood clots, Hyperlipidemia, MRSA (methicillin resistant staph aureus) culture positive, Neuropathy, Pancreatitis, Tobacco abuse, and Uses wheelchair. has a past surgical history that includes  section (unknown); pre-malignant / benign skin lesion excision (7003); other surgical history (Left, 2016); Toe amputation (Left, 2017); other surgical history (Right, 10/20/2017); other surgical history (Right, 2018); pr debridement, skin, sub-q tissue,muscle,bone,=<20 sq cm (Right, 2018); Foot Debridement (Right, 2019); Foot Debridement (Right, 2019); Foot Debridement (Right, 2019); Foot Debridement (Left, 10/23/2019); Tonsillectomy; knee surgery (Left); Foot Debridement (Right, 3/29/2021); IR TUNNELED CVC PLACE WO SQ PORT/PUMP > 5 YEARS (10/5/2021); Foot Debridement (10/7/2021); and Foot Debridement (Bilateral, 10/13/2021). Restrictions  Restrictions/Precautions  Restrictions/Precautions: Fall Risk, Weight Bearing  Lower Extremity Weight Bearing Restrictions  Right Lower Extremity Weight Bearing: Non Weight Bearing  Left Lower Extremity Weight Bearing: Non Weight Bearing  Position Activity Restriction  Other position/activity restrictions: up with assistance; per chart review from last admission, PAYAM ZIMMER  Subjective              Orientation     Cognition      Objective   Bed mobility  Rolling to Left: Moderate assistance  Rolling to Right: Moderate assistance  Supine to Sit: Moderate assistance  Sit to Supine: Moderate assistance  Scooting:  Moderate assistance  Transfers  Comment: ERNESTINA shabazz/sumeet MACIAS NWB  Ambulation  Ambulation?: No  Stairs/Curb  Stairs?: No     Balance  Posture: Fair  Sitting - Static: Good  Sitting - Dynamic: Good;-  Comments: Pt sat EOB ~ 20 min to perform BUE/BLE therex and eat/drink, SBA  Exercises  Hip Flexion: seated marching x10 reps BLE  Knee Long Arc Quad: x10 reps BLE  Comments: Seated wing-armed breathing with scap squeeze x10 reps with PT demo                        G-Code     OutComes Score                                                     AM-PAC Score  AM-PAC Inpatient Mobility Raw Score : 8 (10/28/21 1411)  AM-PAC Inpatient T-Scale Score : 28.52 (10/28/21 1411)  Mobility Inpatient CMS 0-100% Score: 86.62 (10/28/21 1411)  Mobility Inpatient CMS G-Code Modifier : CM (10/28/21 1411)          Goals  Short term goals  Time Frame for Short term goals: prior to discharge  Short term goal 1: pt will complete supine>sit with mod assist  Short term goal 2: pt will complete rolling with SBA  Short term goal 3: pt will sit EOB without UE support with SBA for 10 min  Patient Goals   Patient goals : none stated    Plan    Plan  Times per week: 2-5  Plan weeks: until discharge  Current Treatment Recommendations: Strengthening, Balance Training, Functional Mobility Training, Home Exercise Program  Safety Devices  Type of devices:  All fall risk precautions in place, Bed alarm in place, Left in bed, Nurse notified, Call light within reach  Restraints  Initially in place: No     Therapy Time   Individual Concurrent Group Co-treatment   Time In 1315         Time Out 1400         Minutes 45         Timed Code Treatment Minutes: Kanslerinrinne 45, PT   Electronically signed by Gely Rondon PT on 10/28/2021 at 2:31 PM

## 2021-10-28 NOTE — PROGRESS NOTES
Surgery Daily Progress Note      CC: RUQ abdominal pain    SUBJECTIVE:  Rested well overnight. Patient with hemoglobin of 6.9 yesterday, receiving 1U PRBCs this morning. No abdominal pain unless abdomen is palpated. Tolerating diet, no nausea or vomiting. ROS:   A 14 point review of systems was conducted, significant findings as noted above. All other systems negative. OBJECTIVE:    PHYSICAL EXAM:  Vitals:    10/28/21 0432 10/28/21 0515 10/28/21 0544 10/28/21 0546   BP: 124/81  135/75    Pulse: 67  62    Resp: 15 16 19    Temp: 98 °F (36.7 °C)  97.8 °F (36.6 °C)    TempSrc: Axillary  Axillary    SpO2: 95% 96% 93%    Weight:    216 lb 7.9 oz (98.2 kg)   Height:           General appearance: drowsy; difficult to arouse  Chest/Lungs: normal respiratory effort, on 5LNC  Cardiovascular: RRR  Abdomen: morbidly obese, soft, mildly tender to palpation in RUQ, non-distended, no guarding/rigidity  Skin: warm and dry  Extremities: B/L lower extremities with ACE bandage wrapping  Neuro: arousable, no focal deficits      ASSESSMENT & PLAN:   Heidy Martínez is a 60 y.o. female with history of COPD, RASHMI, diastolic CHF (EF 17%), previous DVTs (on Eliquis), CKD, and DM who is admitted to [facility] for altered mental status. Our service has been consulted due to concern for RUQ abdominal pain. - Continue to monitor abdominal exams and labs; follow up morning labs. - No indication for surgical intervention this admission, as patient does not have acute cholecystitis.  - Okay to continue diet. - Further management per medical team.  - At discharge, okay to follow up with general surgery for possible elective cholecystectomy once she recovers from her acute illness.     Marlena Asif MD, PGY-2  10/28/21  6:08 AM  [phone]

## 2021-10-28 NOTE — PLAN OF CARE
Problem: Nutrition  Goal: Optimal nutrition therapy  Outcome: Ongoing  Note: Nutrition Problem #1: Increased nutrient needs  Intervention: Food and/or Nutrient Delivery: Continue Current Diet, Modify Oral Nutrition Supplement  Nutritional Goals: Pt will consume >50% of all meals and ONS this adm.

## 2021-10-28 NOTE — PROGRESS NOTES
Clinical Pharmacy Consult Note    Vancomycin - Management by Pharmacy    Consult Date(s): 10/24/21  Consulting Provider(s): Dr. Amos Rock / Plan  1) Hx of bilateral foot infection / osteomyelitis - Vancomycin + Meropenem   Concurrent Antimicrobials:   o Meropenem - Day #27   Day of Vanc Therapy: #27   Current Dosing Method: AUC   Therapeutic Goal: 400-600 mg/mL*h   o Current Dose / Frequency: 750mg IV q24h  o Random level drawn this AM = 20.4mcg/mL. Predicts an AUC of 600 (at top of desired range) and steady state trough of 20.5. SCr also up to 1.9-2.0 this AM.  Accumulation appears to be starting.  o Will hold Vancomycin today to allow accumulated Vancomycin to clear. Will then resume at lower dose of 500mg IV q24h tomorrow AM.  This regimen estimates an AUC = 412 and trough 14. 1.  Will continue to monitor clinical condition and make adjustments to regimen as appropriate. Please call with questions--  Thanks--  Aaliyah Chavarria, PharmD, BCPS, BCGP  [id number] ([facility])   10/28/2021 8:50 AM      Interval update:  No intervention planned for biliary sludge per surgery as no acute cholecystitis. Subjective/Objective:  Shayne Jiménez is a 60 y.o. female with a PMHx significant for obesity, RASHMI, HFrEF, bilateral foot wounds s/p partial amputation, CKD, chronic pain, DM who was recently admitted 10/1-10/16 for bilateral foot wounds, found to have osteomyelitis requiring I&D. Wound cultures grew Pseudomonas, E. Coli and Enterococcus. Pt was discharged on vancomycin and meropenem with recommendations from ID to continue through 11/26. Patient was then admitted 10/18-10/20 with AMS thought to be 2/2 hypoglycemia. Insulin was adjusted, and pt was discharged back to SNF. Pt now represents with AMS thought to be 2/2 hypoglycemia and possible accumulation of Methadone in setting of CKD 4. Pharmacy has been consulted to dose vancomycin.     Pertinent Antimicrobials:  Meropenem 1000 mg IV q12h (10/2-current)   Vancomycin - pharmacy to dose (10/2-current)   750 mg IV q24h (10/15-current)    Of note, known to clinical pharmacy team from earlier two admissions in Oct 2021. Pt was eventually scheduled on vancomycin 750 mg IV q24h with plans to continue through 11/26/21. Regimen predicted AUC of 503 and trough 16.1 as of 10/19/21. Ht Readings from Last 1 Encounters:   10/25/21 5' (1.524 m)      Wt Readings from Last 1 Encounters:   10/28/21 216 lb 7.9 oz (98.2 kg)     Vancomycin Level(s) / Doses:  Date Time Dose Level / Type of Level Interpretation   10/19 0630 750 mg IV q24h Random = 12.3 mcg/mL Drawn ~23h after previous dose. Predicted AUC = 503 & steady state trough + 16.1.   10/24 0851 750mg IV q24h Random = 18.7 mcg/mL Drawn ~24h after previous dose. Predicted  & steady state trough = 17.8.   10/28 0657 750mg IV q24h Random = 20.4 mcg/mL Drawn ~19h after previous dose. Predicts  & steady state trough = 20.5. Note: Serum levels collected for AUC-based dosing may be high if collected in close proximity to the dose administered. This is not necessarily an indicator of toxicity. Recent Labs     10/26/21  0457 10/26/21  0457 10/27/21  0754 10/28/21  0641 10/28/21  0657   CREATININE 1.7*  --  1.8*  --  2.0*  1.9*   BUN 36*  --  32*  --  39*  40*   WBC 8.0   < > 6.8 6.9 7.0    < > = values in this interval not displayed. Estimated Creatinine Clearance: 34 mL/min (A) (based on SCr of 1.9 mg/dL (H)).     Cultures & Sensitivities:  Date Site Micro Susceptibility / Result   10/24 Blood x2 No growth to date    10/14 Rapid flu negative

## 2021-10-28 NOTE — PROGRESS NOTES
Occupational Therapy  Facility/Department: [address]  Daily Treatment Note  NAME: Hawa Hopkins  : 1965  MRN: [id number]    Date of Service: 10/28/2021    Discharge Recommendations:    Hawa Hopkins scored a 14/24 on the AM-PAC ADL Inpatient form. Current research shows that an AM-PAC score of 17 or less is typically not associated with a discharge to the patient's home setting. Based on the patient's AM-PAC score and their current ADL deficits, it is recommended that the patient have 3-5 sessions per week of Occupational Therapy at d/c to increase the patient's independence. Please see assessment section for further patient specific details. If patient discharges prior to next session this note will serve as a discharge summary. Please see below for the latest assessment towards goals. Assessment   Performance deficits / Impairments: Decreased functional mobility ; Decreased ADL status; Decreased endurance;Decreased strength;Decreased cognition;Decreased balance;Decreased coordination  Assessment: pt with increased cognition, awareness and participation this date. Pt able to follow simple commands, with decreased attention. Pt required mod A for bed mobility and demo'd ability to sit EOB for ~20 min with SBA. Pt would benefit from continued IP OT at DC. Cont OT POC  Treatment Diagnosis: Decreased activity tolerance, impaired ADLs and mobility  Prognosis: Fair  Decision Making: Medium Complexity  OT Education: OT Role;Plan of Care;Transfer Training;Orientation; Energy Conservation; ADL Adaptive Strategies  Patient Education: verb understanding, needs reinforcing  Barriers to Learning: cognition  Activity Tolerance  Activity Tolerance: Patient Tolerated treatment well  Safety Devices  Safety Devices in place: Yes  Type of devices: Call light within reach;Nurse notified; Bed alarm in place; Left in bed         Patient Diagnosis(es): The primary encounter diagnosis was Acute hypoxemic respiratory failure ([facility] [address] Diagnoses of Altered mental status, unspecified altered mental status type and Anasarca were also pertinent to this visit. has a past medical history of Asthma, Bacterial vaginosis, Carpal tunnel syndrome, COPD (chronic obstructive pulmonary disease) ([facility] [address] Diabetes mellitus type II, Diabetic neuropathy ([facility] [address] Diastolic CHF ([facility] [address] DVT (deep venous thrombosis) ([facility] [address] Dyslipidemia, Dyspareunia, ETOH abuse, HTN (hypertension), Hx of blood clots, Hyperlipidemia, MRSA (methicillin resistant staph aureus) culture positive, Neuropathy, Pancreatitis, Tobacco abuse, and Uses wheelchair. has a past surgical history that includes  section (unknown); pre-malignant / benign skin lesion excision (7003); other surgical history (Left, 2016); Toe amputation (Left, 2017); other surgical history (Right, 10/20/2017); other surgical history (Right, 2018); pr debridement, skin, sub-q tissue,muscle,bone,=<20 sq cm (Right, 2018); Foot Debridement (Right, 2019); Foot Debridement (Right, 2019); Foot Debridement (Right, 2019); Foot Debridement (Left, 10/23/2019); Tonsillectomy; knee surgery (Left); Foot Debridement (Right, 3/29/2021); IR TUNNELED CVC PLACE WO SQ PORT/PUMP > 5 YEARS (10/5/2021); Foot Debridement (10/7/2021); and Foot Debridement (Bilateral, 10/13/2021). Restrictions  Restrictions/Precautions  Restrictions/Precautions: Fall Risk, Weight Bearing  Lower Extremity Weight Bearing Restrictions  Right Lower Extremity Weight Bearing: Non Weight Bearing  Left Lower Extremity Weight Bearing: Non Weight Bearing  Position Activity Restriction  Other position/activity restrictions: up with assistance; per chart review from last admission, PAYAM ZIMMER  Subjective   General  Chart Reviewed: Yes  Additional Pertinent Hx: 60 y.o. F admitted 10/23 with AMS due to hypoglycemia and methadone use.  PMHx includes recent tx of OM with B partial foot amps, wound vac in place, asthma, COPD, CHF, neuropathy, blood clots, ETOH abuse  Family / Caregiver Present: No  Referring Practitioner: Mahamed Lechuga  Subjective  Subjective: Pt in bed on entry. Difficult to rouse, not responsive until being moved to EOB by therapists. Also with periods of decreased responsiveneness in sitting, but with eyes open. General Comment  Comments: c/o pain with movement of legs and at trunk, RN monitoring pt for pain control      Orientation  Orientation  Overall Orientation Status: Within Functional Limits  Objective    ADL  Feeding: Setup (pt ate ice cream with RUE sitting EOB)  UE Dressing: Minimal assistance (min A to don gown)  LE Dressing: Dependent/Total (A for all parts to don brief at bed level)  Toileting:  (using purwick in bed, pt unable to safely access bathroom due to NWB, total A for cleanup and brief change)        Balance  Sitting Balance:  (pt sat EOB for ~20min to complete UE/LE exercises)  Standing Balance: Unable to assess(comment)  Bed mobility  Rolling to Left: Moderate assistance  Rolling to Right: Moderate assistance  Supine to Sit: Moderate assistance  Sit to Supine: Moderate assistance  Scooting: Moderate assistance  Transfers  Transfer Comments: pt would require glory for transfers                                            Type of ROM/Therapeutic Exercise  Type of ROM/Therapeutic Exercise: AROM  Comment: pt completed scapuluar protraction exercises and UE bicep curls. exercises limited 2/2 pts need to urinate.                     Plan   Plan  Times per week: 2-5x  Times per day: Daily  Current Treatment Recommendations: Balance Training, Functional Mobility Training, Endurance Training, Patient/Caregiver Education & Training, Equipment Evaluation, Education, & procurement, Pain Management, Self-Care / ADL, Strengthening, Wheelchair Mobility Training, Cognitive Reorientation                                                    AM-PAC Score        AM-PAC Inpatient Daily Activity Raw

## 2021-10-28 NOTE — PROGRESS NOTES
Comprehensive Nutrition Assessment    Type and Reason for Visit:  Reassess    Nutrition Recommendations/Plan:   1. PO Diet: Continue current diet (3CCC, easy to chew)  2. ONS: discontinue pudding ONS TID, start Magic cup BID    Nutrition Assessment:  Follow-up. Pt states she has been eating some of her meals, but dislikes pudding ONS. Intakes 51-76% and % per EMR. Pt mostly coherent throughout visit today. Needed arousal from nurse to remain focused. Visited during lunch and pt seemed eager to eat. Pt stated she would try Magic Cup ONS, will order. Continuing to monitor while inpatient. Malnutrition Assessment:  Malnutrition Status:  No malnutrition    Context:  Chronic Illness       Estimated Daily Nutrient Needs:  Energy (kcal):  785-1473kcal/d (8-15kcal/kg); Weight Used for Energy Requirements:  Current     Protein (g):  90g/d (2g/kg IBW);  Weight Used for Protein Requirements:  Ideal        Method Used for Fluid Requirements:  1 ml/kcal      Nutrition Related Findings:  lbm pta; R & L toe amps      Wounds:  Multiple       Current Nutrition Therapies:    ADULT DIET; Easy to Chew; 3 carb choices (45 gm/meal)  ADULT ORAL NUTRITION SUPPLEMENT; Lunch, Dinner; Frozen Oral Supplement    Anthropometric Measures:  · Height: 5' (152.4 cm)  · Current Body Weight: 216 lb 7.9 oz (98.2 kg)   · Admission Body Weight: (P) 217 lb (98.4 kg)    · Usual Body Weight:  (ERNESTINA)     · Ideal Body Weight: 100 lbs; % Ideal Body Weight 216.5 %   · BMI: 42.3  · BMI Categories: (P) Obese Class 3 (BMI 40.0 or greater)       Nutrition Diagnosis:   · Increased nutrient needs related to increase demand for energy/nutrients as evidenced by wounds      Nutrition Interventions:   Food and/or Nutrient Delivery:  Continue Current Diet, Modify Oral Nutrition Supplement  Nutrition Education/Counseling:  No recommendation at this time   Coordination of Nutrition Care:  Continue to monitor while inpatient    Goals:  Pt will consume >50% of all meals and ONS this adm.        Nutrition Monitoring and Evaluation:   Food/Nutrient Intake Outcomes:  Food and Nutrient Intake, Supplement Intake  Physical Signs/Symptoms Outcomes:  Skin, Biochemical Data, Meal Time Behavior       Electronically signed by Kristina Hardy MS, RD, LD on 10/28/21 at 1:43 PM EDT    Contact: [phone]

## 2021-10-28 NOTE — PROGRESS NOTES
Progress Note    Admit Date: 10/23/2021  Day: 6  Diet: ADULT DIET; Easy to Chew; 3 carb choices (45 gm/meal)  ADULT ORAL NUTRITION SUPPLEMENT; Breakfast, Lunch, Dinner; Fortified Pudding Oral Supplement    CC: AMS    Interval history: NAEO. Doing better this morning. Cont to have SOB    HPI:   Alina Fang is a 59 y/o F w/ hx if RASHMI, COPD, diastolic HF, DVT (8253), bilateral lower foot wounds w/ partial amputations, osteomyelitis on vancomycin and meropenem, CKD, DM who presents w/ AMS. EMS was called by the patient's daughters due to concern about waxing and waning mental status. The patient was recently discharged on 10/16 during which she was diagnosed w/ osteomyelitis of feet and given would care, multiple I&D, and antibiotic therapy. She was then discharged to a skilled nursing facility. She was readmitted on 10/18 due to being found unresponsive after receiving 140 mg of methadone in the morning and she was also found to have Bg of 30 and was given glucagon at that time. Patient was started on D50 and IVF and podiatry performed an I&D w/ partial cuboid resection. The patient was discharged on 10/20 after mental status returned to A&OX4. Tonight, in the emergency department the patient was alert to name only. The patient was hypoxic on room air and was responsive to 4L nasal cannula. Per EMR the patient's baseline is 2-3 liters. The patient's blood glucose was found to be 65, K was 5.2 (no EKG changes), and Creatine was 1.9 (baseline 1.4-1.7). The patient was admitted to the medicine floor for further workup and care.      Medications:     Scheduled Meds:   magnesium sulfate  2,000 mg IntraVENous Once    [START ON 10/29/2021] vancomycin  500 mg IntraVENous Q24H    insulin glargine  7 Units SubCUTAneous Daily    methadone  80 mg Oral Daily    metoprolol tartrate  25 mg Oral BID    sodium chloride flush  5-40 mL IntraVENous 2 times per day    amitriptyline  100 mg Oral Nightly    aspirin  81 mg Oral Daily hepatomegaly. Tenderness: There is abdominal tenderness. Comments: RUQ pain   Musculoskeletal:         General: Normal range of motion. Cervical back: Normal range of motion. Right lower leg: Edema present. Left lower leg: Edema present. Skin:     General: Skin is warm and dry. Neurological:      General: No focal deficit present. Mental Status: She is alert. Psychiatric:         Mood and Affect: Mood normal.         LABS:    CBC:   Recent Labs     10/27/21  0754 10/28/21  0641 10/28/21  0657   WBC 6.8 6.9 7.0   HGB 6.9* 8.3* 8.3*   HCT 21.0* 25.1* 24.8*    386 387   MCV 85.9 87.2 87.3     Renal:    Recent Labs     10/26/21  0457 10/27/21  0754 10/28/21  0657    141 140  139   K 4.7 4.9 5.1  5.1    103 103  102   CO2 30 32 30  30   BUN 36* 32* 39*  40*   CREATININE 1.7* 1.8* 2.0*  1.9*   GLUCOSE 139* 62* 243*  238*   CALCIUM 9.0 8.3 8.3  8.2*   MG  --   --  1.70*   PHOS  --   --  4.9   ANIONGAP 8 6 7  7     Hepatic:   Recent Labs     10/26/21  0457   AST 20   ALT 20   BILITOT <0.2   BILIDIR <0.2   PROT 6.3*   LABALBU 2.4*   ALKPHOS 182*     Troponin:   No results for input(s): TROPONINI in the last 72 hours. BNP: No results for input(s): BNP in the last 72 hours. Lipids: No results for input(s): CHOL, HDL in the last 72 hours. Invalid input(s): LDLCALCU, TRIGLYCERIDE  ABGs:    Recent Labs     10/26/21  1240 10/26/21  1333 10/27/21  0625   PHART 7.364 7.313* 7.367   WSN9ORW 59.0* 67.6* 60.9*   PO2ART 46.5* 80.1 64.3*   FUH9ICY 34* 34* 35*   BEART 7.5* 7.0* 8.6*   G5YOFWHB 80* 95 91*   CMB7DYA 35 36 37       INR:   No results for input(s): INR in the last 72 hours. Lactate: No results for input(s): LACTATE in the last 72 hours. Cultures:  -----------------------------------------------------------------  RAD:   US ABDOMEN LIMITED   Final Result   IMPRESSION :      Gallbladder sludge without evidence of acute cholecystitis.       Mildly coarsened caliber. Right IJ venous catheter tip at the cavoatrial junction. There is diffuse anasarca. Small gallstones are noted in the gallbladder. There is a calcification in the tail the    pancreas measuring 1.1 cm which is stable since previous abdominal CT from June 2021. There is a cystic area in the tail the pancreas measuring 2.4 x 1.2 cm which is also unchanged. On previous chest CT from 2011, the cystic area in the tail the pancreas    measured 2.8 x 1.7 cm. No fractures are identified. IMPRESSION:   1. Moderate patchy bilateral airspace disease consistent with multifocal pneumonia including potential atypical viral etiologies. Clinical correlation recommended. 2. Trace right pleural effusion. 3. Anasarca. XR CHEST PORTABLE   Final Result   1. Stable diffuse bilateral airspace disease. Assessment/Plan:    Dwayne Buchanan is a 59 y/o F w/ hx if RASHMI, COPD, diastolic CHF (EF 96%), DVT (2004), bilateral lower foot wounds w/ partial amputations, osteomyelitis on vancomycin and meropenem, CKD, DM who presents w/ AMS. EMS was called by the patient's daughters due to concern about waxing and waning mental status.     Acute metabolic encephalopathy suspected 2/2 methadone vs hypoglycemia  On arrival to ED patient was only alert to her name. On the floor patient was alert and oriented X4 but was somnolent. Arousalable to verbal stimuli. CKD4 may cause decreased renal clearance of methadone  -Patient has previous hospitalization 4 days ago for similar presentation  -continue on half dose Lantus (10U)--dec to 7 today  -LDSS  -Follow glucose 4x daily  -Restarted home methadone at half dose (70mg), agreed with patient to inc to 120mg, pt lethargic this morning. Will talk to pharmacy for appropriated dosing     Acute hypoxic respiratory failure 2/2 decreased respiratory drive - improved  Patient has hx of COPD (30 pack year smoking hx). Baseline oxygen 2-3L.  Echo 10/21 showed PASP of 60mmHg, EF ~55%, and mild LVH. RR depressed on presentation (as low as 7 breaths per minute). -Patient was hypoxic on RA at presentation, responsive to 4L on nasal cannula  -Cont home nebulizers, patient received 40 mg furosemide in ED  - currently on 5L, with improved RR  -continues to have diffuse crackles bilaterally, switched from po to IV lasix 40 bid x3 doses  -BIPAP nightly  -will talk to pharmacy regarding appropriate dose of methadone  -60mg IV lasix     Patchy Bilateral airspace disease possibly 2/2 volume overload  Unlikely bacterial due to continuous treatment w/ Vanc / and Merropenem  -COVID pending  -Echo 2018 had EF of 50-55%  -Received diuretics in ED  -40mg oral lasix  -strict I's and O's  -lasix as above     RUQ Pain  Positive Leavitt's sign, hepatomegaly, gallstones on CT, hep  States she quit smoking 20 years ago  -Hepatic function panel: elevated alk phos  -RUQ U/S of gallbladder and liver: gallbladder slidge w/o evidence of acute cholecystitis, mildly coarsened hepatic echo texture  -surgery following, no interventions at this time. Osteomyelitis  Cult polymicrobial with Ps aerug (R cipro), E coli, E faecalis. Seen by ID previously, IV abx ordered (vanc, merrem) through 11/26.   -Cont Vanc and Meropenem  -Patient's dressings were dry, patient reports dressings were changed on day of presentation  -wound vac draining serosanguinous fluid     Elevated Troponin  -0.05 -> 0.04  -Could be secondary to dehydration, CKD  -downtrending     Chronic problems:  DM2   - continue half dose of lantus, LDSS, hypoglycemia protocol, glucose checks QID    CKD4  - avoid nephrotoxic agents, NSAIDs. Daily weights. Maintain SBP >90.     Moderate PAH  - Echo 10/2021 showed PASP of 60mmHg    Hx of DVT   - continue eliquis    Anxiety/Depression  -continue home amitryptiline (consider reducing dose), escitalopram    Code Status:FULL  FEN: Adult regular, 3 carb  PPX: eliquis  DISPO: Kayode Kay MD, PGY-1  10/28/21  9:31 AM    This patient has been staffed and discussed with Blayne Birmingham MD.

## 2021-10-29 NOTE — CONSULTS
Nephrology Consult Note                                                                                                                                                                                                                                                                                                                                                               Office : [phone]     Fax :[phone]              Patient's Name: Goldy Romero    Reason for Consult: FAHEEM   Requesting Physician:  Daria Davis MD      Chief Complaint:  SOB     History of Present Ilness:    Claudeen Spar is a 59 y/o F w/ hx if RASHMI, COPD, diastolic HF, DVT (2449), bilateral lower foot wounds w/ partial amputations, osteomyelitis on vancomycin and meropenem, CKD, DM who presents w/ AMS. EMS was called by the patient's daughters due to concern about waxing and waning mental status. The patient was recently discharged on 10/16 during which she was diagnosed w/ osteomyelitis of feet and given would care, multiple I&D, and antibiotic therapy. She was then discharged to a skilled nursing facility. She was readmitted on 10/18 due to being found unresponsive after receiving 140 mg of methadone in the morning and she was also found to have Bg of 30 and was given glucagon at that time. Patient was started on D50 and IVF and podiatry performed an I&D w/ partial cuboid resection. Keira Koo, in the emergency department the patient was alert to name only. The patient was hypoxic on room air and was responsive to 4L nasal cannula. Per EMR the patient's baseline is 2-3 liters. The patient's blood glucose was found to be 65, K was 5.2 (no EKG changes), and Creatine was 1.9 (baseline 1.4-1.7).  The patient was admitted to the medicine floor for further workup and care.     Past Medical History:   Diagnosis Date    Asthma 05/14/2004    Bacterial vaginosis 04/2008    Carpal tunnel syndrome 05/2007    COPD [facility]chronic obstructive [facility] disease) ([facility]     Diabetes mellitus type II 2007    10/1/20 pt states does accucheck 2x/day at home    Diabetic neuropathy [facility]     Diastolic CHF ([facility]     DVT (deep venous thrombosis) ([facility] 2004    Dyslipidemia 2009    Dyspareunia 2009    ETOH abuse 2007    HTN (hypertension)     Hx of blood clots     Hyperlipidemia     MRSA (methicillin resistant staph aureus) culture positive 2017; 2017    foot; leg     Neuropathy 2009    polyneuropathy    Pancreatitis 2004    Tobacco abuse 2008    Uses wheelchair     also uses walker       Past Surgical History:   Procedure Laterality Date     SECTION  unknown    FOOT DEBRIDEMENT Right 2019    INCISION AND DRAINAGE WITH APPLICATION OF STRAVIX GRAFT RIGHT FOOT performed by Juliann Gibbons DPM at [address] Right 2019    RIGHT FOOT INCISION AND DRAINAGE WITH STAGING TRANSMETATARSAL AMPUTATION performed by Juliann Gibbons DPM at [address] Right 2019    RIGHT FOOT DEBRIDEMENT INCISION AND DRAINAGE, OPEN DIABETIC FOOT ULCER WITH GRAFT PLACEMENT performed by Juliann Gibbons DPM at [address] Left 10/23/2019    LEFT FOOT INCISION AND DRAINAGE , DEBRIDEMENT OF OPEN WOUND, APPLICATION OF STRAVIX GRAFT performed by Juliann Gibbons DPM at [address] Right 3/29/2021    INCISION AND DRAINAGE, DEBRIDEMENT OF DIABETIC WOUND WITH PLACEMENT OF STRAVIX GRAFT RIGHT FOOT performed by Juliann Gibbons DPM at [address]  10/7/2021    BILATERAL LOWER EXTREMITY INCISION AND DRAINAGE, RIGHT FOOT 4TH RAY RESECTION, LEFT FOOT 5TH RAY RESECTION performed by Juliann Gibbons DPM at [address] Bilateral 10/13/2021    REPEAT INCISION AND DRAINAGE OF ALL NONVIABLE SOFT TISSUE AND BONE/ PARTIAL CUBOID RESECTION/ BONY BIOPSY AS NEEDED/ WOUND VAC RIGHT LOWER EXTREMITY [facility]UNNELED CATHE[facility] PLACEMENT GREATER THAN 5 YEARS  10/5/2021    IR TUNNELED CATHETER PLACEMENT GREATER THAN 5 YEARS 10/5/2021 [facility]    KNEE SURGERY Left     from falling off ladder -- has screws in place pt report    OTHER SURGICAL HISTORY Left 05/25/2016    I & D left foot    OTHER SURGICAL HISTORY Right 10/20/2017    RIGHT GASTROC LENGTHENING ENDOSCOPIC, INJECTION OF AMNI GRAFT    OTHER SURGICAL HISTORY Right 04/26/2018    Diabetic foot ulcer I&D w/ integra graft application    PA DEBRIDEMENT, SKIN, SUB-Q TISSUE,MUSCLE,BONE,=<20 SQ CM Right 8/17/2018    RIGHT FOOT DEBRIDEMENT INCISION AND DRAINAGE, PARTIAL 5TH RAY AMPUTATION performed by Shanel Devine DPM at [address] [place] / [address]  7/7003    cryotherapy done on lesion    TOE AMPUTATION Left 02/24/2017    AMPUTATION LEFT GREAT TOE                 TONSILLECTOMY         History reviewed. No pertinent family history. reports that she quit smoking about 3 years ago. Her smoking use included cigarettes. She has a 30.00 pack-year smoking history. She has never used smokeless tobacco. She reports that she does not drink alcohol and does not use drugs.     Allergies:  Sulfa antibiotics    Current Medications:    0.9 % sodium chloride bolus, Once  vancomycin (VANCOCIN) 500 mg in dextrose 5 % 250 mL IVPB, Q24H  naloxone (NARCAN) injection 0.4 mg, PRN  insulin glargine (LANTUS;BASAGLAR) injection pen 7 Units, Daily  methadone (DOLOPHINE) 10 MG/ML solution 80 mg, Daily  metoprolol tartrate (LOPRESSOR) tablet 25 mg, BID  sodium chloride flush 0.9 % injection 5-40 mL, 2 times per day  sodium chloride flush 0.9 % injection 5-40 mL, PRN  0.9 % sodium chloride infusion, PRN  ondansetron (ZOFRAN-ODT) disintegrating tablet 4 mg, Q8H PRN   Or  ondansetron (ZOFRAN) injection 4 mg, Q6H PRN  polyethylene glycol (GLYCOLAX) packet 17 g, Daily PRN  acetaminophen (TYLENOL) tablet 650 mg, Q6H PRN   Or  acetaminophen (TYLENOL) suppository 650 mg, Q6H PRN  glucose (GLUTOSE) 40 % oral gel 15 g, PRN  dextrose 50 % IV solution, PRN  glucagon (rDNA) injection 1 mg, PRN  dextrose 5 % solution, PRN  amitriptyline (ELAVIL) tablet 100 mg, Nightly  aspirin EC tablet 81 mg, Daily  atorvastatin (LIPITOR) tablet 20 mg, Nightly  apixaban (ELIQUIS) tablet 5 mg, BID  escitalopram (LEXAPRO) tablet 10 mg, Daily  miconazole (MICOTIN) 2 % powder, BID  pantoprazole (PROTONIX) tablet 40 mg, Daily  meropenem (MERREM) 1,000 mg in sodium chloride 0.9 % 100 mL IVPB (mini-bag), 2 times per day  insulin lispro (1 Unit Dial) 0-6 Units, TID WC  insulin lispro (1 Unit Dial) 0-3 Units, Nightly        Review of Systems:   Lethargic     Physical exam:     Vitals:  BP (!) 160/82   Pulse 67   Temp 97.9 °F (36.6 °C) (Oral)   Resp 12   Ht 5' (1.524 m)   Wt 216 lb 7.9 oz (98.2 kg)   LMP 10/23/2015   SpO2 95%   BMI 42.28 kg/m²   Constitutional:  Lethargic   Skin: no rash, turgor wnl  Heent:  eomi, mmm  Neck: no bruits or jvd noted  Cardiovascular:  S1, S2 without m/r/g  Respiratory: Crackles   Abdomen:  +bs, soft, nt, nd  Ext: + lower extremity edema  Psychiatric: mood and affect appropriate  Musculoskeletal:  Rom, muscular strength intact    Data:   Labs:  CBC:   Recent Labs     10/27/21  0754 10/28/21  0641 10/28/21  0657   WBC 6.8 6.9 7.0   HGB 6.9* 8.3* 8.3*    386 387     BMP:    Recent Labs     10/26/21  0457 10/27/21  0754 10/28/21  0657    141 140  139   K 4.7 4.9 5.1  5.1    103 103  102   CO2 30 32 30  30   BUN 36* 32* 39*  40*   CREATININE 1.7* 1.8* 2.0*  1.9*   GLUCOSE 139* 62* 243*  238*     Ca/Mg/Phos:   Recent Labs     10/26/21  0457 10/27/21  0754 10/28/21  0657   CALCIUM 9.0 8.3 8.3  8.2*   MG  --   --  1.70*   PHOS  --   --  4.9     Hepatic:   Recent Labs     10/26/21  0457   AST 20   ALT 20   BILITOT <0.2   ALKPHOS 182*     Troponin: No results for input(s): TROPONINI in the last 72 hours.   BNP: No results for input(s): BNP in the last 72 hours. Lipids: No results for input(s): CHOL, TRIG, HDL, LDLCALC, LABVLDL in the last 72 hours. ABGs:   Recent Labs     10/27/21  0625   PHART 7.367   PO2ART 64.3*   LES6YEK 60.9*     INR: No results for input(s): INR in the last 72 hours. UA:No results for input(s): Windell Carrier, GLUCOSEU, BILIRUBINUR, Maryclare [place], BLOODU, PHUR, PROTEINU, UROBILINOGEN, NITRU, LEUKOCYTESUR, LABMICR, URINETYPE in the last 72 hours. Urine Microscopic: No results for input(s): LABCAST, BACTERIA, COMU, HYALCAST, WBCUA, RBCUA, EPIU in the last 72 hours. Urine Culture: No results for input(s): LABURIN in the last 72 hours. Urine Chemistry: No results for input(s): Breanna Jayy, PROTEINUR, NAUR in the last 72 hours. IMAGING:  XR CHEST PORTABLE   Final Result      Coarse multifocal consolidation with significant progression since 10/23/2021. Correlate for viral pneumonia. Stable cardiomediastinal silhouette. Right jugular central venous catheter without change. US ABDOMEN LIMITED   Final Result   IMPRESSION :      Gallbladder sludge without evidence of acute cholecystitis. Mildly coarsened hepatic echotexture. Correlate with liver function tests. FL MODIFIED BARIUM SWALLOW W VIDEO   Final Result      1. Weak swallowing reflex with delayed AP transit of the bolus. 2.  No aspiration penetration with the various consistencies attempted. Please see speech pathology report for additional findings and recommendations. CT CHEST WO CONTRAST   Final Result   1. No acute intracranial findings. CHEST:         FINDINGS:      Patchy bilateral groundglass opacity and airspace disease in both upper lobes and to a lesser extent in both lower lobes consistent with pneumonia. Small right pleural effusion. No significant left pleural effusion. Normal heart size. No pericardial    effusion. Vascular aorta and main pulmonary artery are normal in caliber.   Right IJ venous catheter tip at the cavoatrial junction. There is diffuse anasarca. Small gallstones are noted in the gallbladder. There is a calcification in the tail the    pancreas measuring 1.1 cm which is stable since previous abdominal CT from June 2021. There is a cystic area in the tail the pancreas measuring 2.4 x 1.2 cm which is also unchanged. On previous chest CT from 2011, the cystic area in the tail the pancreas    measured 2.8 x 1.7 cm. No fractures are identified. IMPRESSION:   1. Moderate patchy bilateral airspace disease consistent with multifocal pneumonia including potential atypical viral etiologies. Clinical correlation recommended. 2. Trace right pleural effusion. 3. Anasarca. CT Head WO Contrast   Final Result   1. No acute intracranial findings. CHEST:         FINDINGS:      Patchy bilateral groundglass opacity and airspace disease in both upper lobes and to a lesser extent in both lower lobes consistent with pneumonia. Small right pleural effusion. No significant left pleural effusion. Normal heart size. No pericardial    effusion. Vascular aorta and main pulmonary artery are normal in caliber. Right IJ venous catheter tip at the cavoatrial junction. There is diffuse anasarca. Small gallstones are noted in the gallbladder. There is a calcification in the tail the    pancreas measuring 1.1 cm which is stable since previous abdominal CT from June 2021. There is a cystic area in the tail the pancreas measuring 2.4 x 1.2 cm which is also unchanged. On previous chest CT from 2011, the cystic area in the tail the pancreas    measured 2.8 x 1.7 cm. No fractures are identified. IMPRESSION:   1. Moderate patchy bilateral airspace disease consistent with multifocal pneumonia including potential atypical viral etiologies. Clinical correlation recommended. 2. Trace right pleural effusion. 3. Anasarca. XR CHEST PORTABLE   Final Result   1.  Stable diffuse bilateral airspace disease. Assessment/Plan   1. FAHEEM on CKD 3     2. HTN    3. Anemia    4. Acid- base/ Electrolyte imbalance     5.  Hypoxia     Plan   - Trial of Diuresis   - If Cr worse hold diuretics   - Aspiration is likely the cause of Hypoxia   - Hold sedatives   - labs in am     Recommend to dose adjust all medications  based on renal functions  Maintain SBP> 90 mmHg   Daily weights   AVOID NSAIDs  Avoid Nephrotoxins  Monitor Intake/Output  Call if significant decrease in urine output                   Thank you for allowing us to participate in care of Perry Thurston MD  Feel free to contact me   [facility] [address]  Office : [phone]  Fax :[phone]

## 2021-10-29 NOTE — PROGRESS NOTES
Podiatric Surgery Daily Progress Note      Admit Date: 10/23/2021                           Code:Full Code    Patient seen and examined, labs and records reviewed    Subjective:     Patient seen and examined at bedside this AM. Patient denies f/c/n/v/sob/cp. Patient was sitting up comfortably in her bed. Patient very pleasant this afternoon. Patient has no other pedal complaints during today's examination. Objective     /61   Pulse 60   Temp 97.9 °F (36.6 °C) (Axillary)   Resp 13   Ht 5' (1.524 m)   Wt 217 lb 6 oz (98.6 kg)   LMP 10/23/2015   SpO2 96%   BMI 42.45 kg/m²      I/O:    Intake/Output Summary (Last 24 hours) at 10/29/2021 0519  Last data filed at 10/29/2021 0410  Gross per 24 hour   Intake 800 ml   Output 1225 ml   Net -425 ml              Wt Readings from Last 3 Encounters:   10/29/21 217 lb 6 oz (98.6 kg)   10/20/21 223 lb 1.7 oz (101.2 kg)   10/15/21 228 lb 2.8 oz (103.5 kg)       LABS:    Recent Labs     10/28/21  0641 10/28/21  0657   WBC 6.9 7.0   HGB 8.3* 8.3*   HCT 25.1* 24.8*    387        Recent Labs     10/28/21  0657     139   K 5.1  5.1     102   CO2 30  30   PHOS 4.9   BUN 39*  40*   CREATININE 2.0*  1.9*        No results for input(s): PROT, INR, APTT in the last 72 hours. LOWER EXTREMITY EXAMINATION    Dressing to B/L LE intact. No strikethrough drainage noted to the external dressing. No strikethrough drainage noted to the internal dressing layer B/L LE. Wound VAC running optimally at 150 mmHg    VASCULAR:   - DP and PT pulses are nonpalpable  b/l. - Upon hand-held Doppler biphasic signals noted to DP and PT pulses B/L LE.  - CFT brisk to the dorsal and plantar TMA stump site right foot. - CFT brisk to to the remaining distal digits of the foot left foot.    - Skin temperature is warm to cool from proximal to distal with no focal calor noted and equal skin temperature as compared to the contralateral limb.  - Nonpitting edema noted b/l with slight improvement 2/2 Ace compressive therapy. - No pain with calf compression b/l.     NEUROLOGIC:   - Gross and epicritic sensation is diminished b/l.   - Protective sensation is diminished at all pedal sites b/l.     DERMATOLOGIC:   Right lower extremity:  #1  Lateral aspect 5 cm linear well approximated skin incision with intact sutures.  -No dehiscence or drainage noted. -Stable and without periwound erythema.     #2 Full-thickness ulceration cuboid region plantar aspect.  -Wound measures approximately 4.0 x 3.9 x 3.0 cm.  -Wound base granular with macerated periwound.  -Wound does not probe to bone, tunnels, or tracks.  -No fluctuance, crepitus, malodor, or drainage noted. -Clinically stable and improving 2/2 wound VAC therapy       Picture taken 10/29/2021        Left lower extremity:  #1 lateral aspect left foot  -10 cm linear surgical incision with well coapted skin edges.  -Diffuse xerotic skin with no dehiscence or acute signs infection. #2 Full-thickness plantar aspect fourth metatarsal head  -Wound measures approximately 2.0 x 1.1 x 0.1 cm  -Wound base fibrotic in nature with epithelialized borders.  -Wound does not probe to bone, tunnels, or tracks.  -No fluctuance, crepitus, malodor, or drainage noted. -Stable and has been since admission    Picture taken 10/29/2021    Patient gave verbal consent for the picture taken at today's visit.      MUSCULOSKELETAL:   - Muscle strength is 4/5 for all pedal groups tested. - No pain with palpation of the foot or ankle b/l. - Ankle joint ROM is decreased in dorsiflexion with the knee extended.    - Hx of hallux amputation and fifth ray resection left foot  - Hx of transmetatarsal amputation right foot      ASSESSMENT/PLAN  -Full-thickness ulceration Carpenter 2; plantar cuboid right foot  -Full-thickness ulceration Carpenter 1; subfourth metatarsal left foot  -S/P I&D, partial cuboid resection of right foot, I&D with [facility] left foot (10/13/21)  -PVD; b/l LE  -Lymphedema; b/l LE  -Type 2 diabetes mellitus with peripheral neuropathy  -History of osteomyelitis; b/l LE  -History of noncompliance with weight bearing status      -Patient seen and examined at bedside this a.m.  -Hypertensive, afebrile, no leukocytosis noted   -No imaging obtained 2/2 low concern of foot infection and well known chronic osteomyelitis from prior imaging.  -Wound culture not obtained at this time 2/2 no active drainage and would only colonized skin sukhjinder. -Infectious disease following, continue IV vancomycin and meropenem.  -Right lower extremity dressed with wound VAC, DSD, and Ace bandage.  -Left lower extremity dressed with Adaptic, DSD, and Ace bandage.  -Keep B/L LE dressings clean, dry, and intact and will reevaluate Monday for next dressing change.  -None weightbearing to bilateral LE. Can pivot as needed to transfer to bathroom or bed. DISPO: Full-thickness ulcerations subcuboid right foot and plantar aspects of fourth metatarsal clinically stable and without acute signs infection. Please continue IV vancomycin and meropenem. No surgical intervention and okay for discharge from podiatry pending medically stable. Patient can follow-up with Dr. Parisa Benton at the podiatry clinic 1 week after being discharged from the hospital.      Discussed assessment and plan with Dr. Parisa Benton.     JERED EliasM   Podiatric Resident, PGY-3  Pager: [phone] or Perfect serve   10/29/2021  5:18 AM

## 2021-10-29 NOTE — PROGRESS NOTES
Nephrology Progress Note  PGY 3  Internal Medicine      Admit Date: 10/23/2021  Diet: ADULT ORAL NUTRITION SUPPLEMENT; Lunch, Dinner; Frozen Oral Supplement  ADULT DIET; Easy to Chew; 3 carb choices (45 gm/meal); Low Sodium (2 gm)    Chief Complaint: FAHEEM    Interval history:   The patient's creatinine today is 1.8, which is improvement from 2.0. Has received IV Lasix the last 2 days. She has good urine output today. Currently on 5L oxygen and would benefit from further diuresis.          Medications:     Scheduled Meds:   vancomycin  500 mg IntraVENous Q24H    insulin glargine  7 Units SubCUTAneous Daily    methadone  80 mg Oral Daily    metoprolol tartrate  25 mg Oral BID    sodium chloride flush  5-40 mL IntraVENous 2 times per day    amitriptyline  100 mg Oral Nightly    aspirin  81 mg Oral Daily    atorvastatin  20 mg Oral Nightly    apixaban  5 mg Oral BID    escitalopram  10 mg Oral Daily    miconazole   Topical BID    pantoprazole  40 mg Oral Daily    meropenem (MERREM) 1000 mg IVPB (mini-bag)  1,000 mg IntraVENous 2 times per day    insulin lispro  0-6 Units SubCUTAneous TID WC    insulin lispro  0-3 Units SubCUTAneous Nightly     Continuous Infusions:   sodium chloride Stopped (10/29/21 0620)    dextrose       PRN Meds:naloxone, sodium chloride flush, sodium chloride, ondansetron **OR** ondansetron, polyethylene glycol, acetaminophen **OR** acetaminophen, glucose, dextrose, glucagon (rDNA), dextrose    Objective:   Vitals:   T-max:  Patient Vitals for the past 8 hrs:   BP Temp Temp src Pulse Resp SpO2 Weight   10/29/21 0819 (!) 147/66 97.9 °F (36.6 °C) Axillary 63 16 94 %    10/29/21 0410       217 lb 6 oz (98.6 kg)   10/29/21 0405 138/61 97.9 °F (36.6 °C) Axillary 60 13 96 %        Intake/Output Summary (Last 24 hours) at 10/29/2021 1015  Last data filed at 10/29/2021 0624  Gross per 24 hour   Intake 1281.42 ml   Output 1225 ml   Net 56.42 ml       Review of Systems   Constitutional: Negative for activity change, appetite change, chills, diaphoresis, fever and unexpected weight change. HENT: Negative for congestion and sore throat. Eyes: Negative for visual disturbance. Respiratory: Negative for apnea, cough, chest tightness and shortness of breath. Cardiovascular: Negative for chest pain, palpitations and leg swelling. Gastrointestinal: Negative for abdominal pain, constipation, diarrhea, nausea and vomiting. Endocrine: Negative for cold intolerance, heat intolerance, polydipsia, polyphagia and polyuria. Genitourinary: Negative for difficulty urinating. Musculoskeletal: Negative for arthralgias and myalgias. Neurological: Negative for weakness and headaches. Psychiatric/Behavioral: Negative for confusion and sleep disturbance. All other systems reviewed and are negative. Physical Exam  Vitals reviewed. Constitutional:       General: She is not in acute distress. Appearance: She is well-developed and well-groomed. HENT:      Head: Normocephalic and atraumatic. Mouth/Throat:      Mouth: Mucous membranes are moist.      Pharynx: Oropharynx is clear. Eyes:      General: No scleral icterus. Extraocular Movements: Extraocular movements intact. Conjunctiva/sclera: Conjunctivae normal.      Pupils: Pupils are equal, round, and reactive to light. Cardiovascular:      Rate and Rhythm: Normal rate and regular rhythm. Pulses: Normal pulses. Heart sounds: Normal heart sounds, S1 normal and S2 normal. No murmur heard. Pulmonary:      Effort: Pulmonary effort is normal. No respiratory distress. Breath sounds: Normal air entry. Rales present. Abdominal:      General: Abdomen is flat. Bowel sounds are normal.      Palpations: Abdomen is soft. Tenderness: There is no abdominal tenderness. Musculoskeletal:         General: Normal range of motion. Cervical back: Full passive range of motion without pain, normal range of motion and neck supple. hours. Lactate: No results for input(s): LACTATE in the last 72 hours. Cultures:  -----------------------------------------------------------------  RAD:   XR CHEST PORTABLE   Final Result      Coarse multifocal consolidation with significant progression since 10/23/2021. Correlate for viral pneumonia. Stable cardiomediastinal silhouette. Right jugular central venous catheter without change. US ABDOMEN LIMITED   Final Result   IMPRESSION :      Gallbladder sludge without evidence of acute cholecystitis. Mildly coarsened hepatic echotexture. Correlate with liver function tests. FL MODIFIED BARIUM SWALLOW W VIDEO   Final Result      1. Weak swallowing reflex with delayed AP transit of the bolus. 2.  No aspiration penetration with the various consistencies attempted. Please see speech pathology report for additional findings and recommendations. CT CHEST WO CONTRAST   Final Result   1. No acute intracranial findings. CHEST:         FINDINGS:      Patchy bilateral groundglass opacity and airspace disease in both upper lobes and to a lesser extent in both lower lobes consistent with pneumonia. Small right pleural effusion. No significant left pleural effusion. Normal heart size. No pericardial    effusion. Vascular aorta and main pulmonary artery are normal in caliber. Right IJ venous catheter tip at the cavoatrial junction. There is diffuse anasarca. Small gallstones are noted in the gallbladder. There is a calcification in the tail the    pancreas measuring 1.1 cm which is stable since previous abdominal CT from June 2021. There is a cystic area in the tail the pancreas measuring 2.4 x 1.2 cm which is also unchanged. On previous chest CT from 2011, the cystic area in the tail the pancreas    measured 2.8 x 1.7 cm. No fractures are identified. IMPRESSION:   1.  Moderate patchy bilateral airspace disease consistent with multifocal pneumonia including potential and discussed with Dr. Oliverio Bonilla  -----------------------------  Amol Garcia MD, PGY-3  10/29/2021  10:15 AM      Agree with above plan    FAHEEM improving  Give lasix 40 mg IV BID

## 2021-10-29 NOTE — PROGRESS NOTES
Clinical Pharmacy Consult Note    Vancomycin - Management by Pharmacy    Consult Date(s): 10/24/21  Consulting Provider(s): Dr. Tracy Nations / Plan  1) Hx of bilateral foot infection / osteomyelitis - Vancomycin + Meropenem   Concurrent Antimicrobials:   o Meropenem - Day #28   Day of Vanc Therapy: #28   Current Dosing Method: AUC   Therapeutic Goal: 400-600 mg/mL*h   o Dose reduced yesterday after random level showed apparent accumulation. o SCr stable today - continue 500mg IV q24h. This regimen estimates an AUC = 412 and trough 14. 1.  o Will likely plan to check next level Sunday AM.   Will continue to monitor clinical condition and make adjustments to regimen as appropriate. Please call with questions--  Thanks--  Masoud Schuster, PharmD, BCPS, BCGP  [id number] ([facility])   10/29/2021 12:23 PM      Interval update:  New O2 requirement - diuresing for volume overload. Subjective/Objective:  Ronni Fofana is a 60 y.o. female with a PMHx significant for obesity, RASHMI, HFrEF, bilateral foot wounds s/p partial amputation, CKD, chronic pain, DM who was recently admitted 10/1-10/16 for bilateral foot wounds, found to have osteomyelitis requiring I&D. Wound cultures grew Pseudomonas, E. Coli and Enterococcus. Pt was discharged on vancomycin and meropenem with recommendations from ID to continue through 11/26. Patient was then admitted 10/18-10/20 with AMS thought to be 2/2 hypoglycemia. Insulin was adjusted, and pt was discharged back to SNF. Pt now represents with AMS thought to be 2/2 hypoglycemia and possible accumulation of Methadone in setting of CKD 4. Also with RUQ pain this admission - imaging showed biliary sludge, but no plans for intervention as no signs of acute cholecystitis per surgery. Pharmacy has been consulted to dose vancomycin.     Pertinent Antimicrobials:  Meropenem 1000 mg IV q12h (10/2-current)   Vancomycin - pharmacy to dose (10/2-current)   750 mg IV q24h (10/15-current)    Of note, known to clinical pharmacy team from earlier two admissions in Oct 2021. Pt was eventually scheduled on vancomycin 750 mg IV q24h with plans to continue through 11/26/21. Regimen predicted AUC of 503 and trough 16.1 as of 10/19/21. Ht Readings from Last 1 Encounters:   10/28/21 5' (1.524 m)      Wt Readings from Last 1 Encounters:   10/29/21 217 lb 6 oz (98.6 kg)     Vancomycin Level(s) / Doses:  Date Time Dose Level / Type of Level Interpretation   10/19 0630 750 mg IV q24h Random = 12.3 mcg/mL Drawn ~23h after previous dose. Predicted AUC = 503 & steady state trough + 16.1.   10/24 0851 750mg IV q24h Random = 18.7 mcg/mL Drawn ~24h after previous dose. Predicted  & steady state trough = 17.8.   10/28 0657 750mg IV q24h Random = 20.4 mcg/mL Drawn ~19h after previous dose. Predicts  & steady state trough = 20.5. Note: Serum levels collected for AUC-based dosing may be high if collected in close proximity to the dose administered. This is not necessarily an indicator of toxicity. Recent Labs     10/27/21  0754 10/27/21  0754 10/28/21  0641 10/28/21  0657 10/29/21  0516   CREATININE 1.8*  --   --  2.0*  1.9* 1.8*   BUN 32*  --   --  39*  40* 37*   WBC 6.8   < > 6.9 7.0 6.9    < > = values in this interval not displayed. Estimated Creatinine Clearance: 36 mL/min (A) (based on SCr of 1.8 mg/dL (H)).     Cultures & Sensitivities:  Date Site Micro Susceptibility / Result   10/24 Blood x2 No growth to date    10/14 Rapid flu negative

## 2021-10-29 NOTE — PROGRESS NOTES
Surgery Daily Progress Note      CC: RUQ abdominal pain    SUBJECTIVE:  No acute events overnight. Remains afebrile, hemodynamically stable. Diuretics increased yesterday. No new complaints this morning. ROS:   A 14 point review of systems was conducted, significant findings as noted above. All other systems negative. OBJECTIVE:    PHYSICAL EXAM:  Vitals:    10/28/21 2015 10/28/21 2328 10/29/21 0405 10/29/21 0410   BP: (!) 147/79 (!) 160/82 138/61    Pulse: 67 67 60    Resp: 11 12 13    Temp: 97.7 °F (36.5 °C) 97.9 °F (36.6 °C) 97.9 °F (36.6 °C)    TempSrc: Oral Oral Axillary    SpO2: 96% 95% 96%    Weight:    217 lb 6 oz (98.6 kg)   Height:           General appearance: drowsy; difficult to arouse  Chest/Lungs: normal respiratory effort, on 5LNC  Cardiovascular: RRR  Abdomen: morbidly obese, soft, mildly tender to palpation in RUQ, non-distended, no guarding/rigidity  Skin: warm and dry  Extremities: B/L lower extremities with ACE bandage wrapping  Neuro: arousable, no focal deficits      ASSESSMENT & PLAN:   Yoli Avilez is a 60 y.o. 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 Our service has been consulted due to concern for RUQ abdominal pain. - Continue to monitor abdominal exams and labs; follow up morning labs. - No indication for surgical intervention this admission, as patient does not have acute cholecystitis.  - Okay to continue diet. - Further management per medical team.  - At discharge, okay to follow up with general surgery for possible elective cholecystectomy once she recovers from her acute illness.     Maria Fernanda Hackett MD, PGY-2  10/29/21  6:10 AM  [phone]

## 2021-10-29 NOTE — PROGRESS NOTES
Progress Note    Admit Date: 10/23/2021  Day: 6  Diet: ADULT ORAL NUTRITION SUPPLEMENT; Lunch, Dinner; Frozen Oral Supplement  ADULT DIET; Easy to Chew; 3 carb choices (45 gm/meal); Low Sodium (2 gm)    CC: AMS    Interval history: NAEO. Doing better this morning. Cont to have SOB. No complaints this morning. HPI:   [place] [facility] is a 61 y/o F w/ hx if RASHMI, COPD, diastolic HF, DVT (8294), bilateral lower foot wounds w/ partial amputations, osteomyelitis on vancomycin and meropenem, CKD, DM who presents w/ AMS. EMS was called by the patient's daughters due to concern about waxing and waning mental status. The patient was recently discharged on 10/16 during which she was diagnosed w/ osteomyelitis of feet and given would care, multiple I&D, and antibiotic therapy. She was then discharged to a skilled nursing facility. She was readmitted on 10/18 due to being found unresponsive after receiving 140 mg of methadone in the morning and she was also found to have Bg of 30 and was given glucagon at that time. Patient was started on D50 and IVF and podiatry performed an I&D w/ partial cuboid resection. The patient was discharged on 10/20 after mental status returned to A&OX4. Tonight, in the emergency department the patient was alert to name only. The patient was hypoxic on room air and was responsive to 4L nasal cannula. Per EMR the patient's baseline is 2-3 liters. The patient's blood glucose was found to be 65, K was 5.2 (no EKG changes), and Creatine was 1.9 (baseline 1.4-1.7). The patient was admitted to the medicine floor for further workup and care.      Medications:     Scheduled Meds:   furosemide  40 mg IntraVENous BID    vancomycin  500 mg IntraVENous Q24H    insulin glargine  7 Units SubCUTAneous Daily    methadone  80 mg Oral Daily    metoprolol tartrate  25 mg Oral BID    sodium chloride flush  5-40 mL IntraVENous 2 times per day    amitriptyline  100 mg Oral Nightly    aspirin  81 mg Oral Daily    atorvastatin  20 mg Oral Nightly    apixaban  5 mg Oral BID    escitalopram  10 mg Oral Daily    miconazole   Topical BID    pantoprazole  40 mg Oral Daily    meropenem (MERREM) 1000 mg IVPB (mini-bag)  1,000 mg IntraVENous 2 times per day    insulin lispro  0-6 Units SubCUTAneous TID WC    insulin lispro  0-3 Units SubCUTAneous Nightly     Continuous Infusions:   sodium chloride Stopped (10/29/21 0620)    dextrose       PRN Meds:naloxone, sodium chloride flush, sodium chloride, ondansetron **OR** ondansetron, polyethylene glycol, acetaminophen **OR** acetaminophen, glucose, dextrose, glucagon (rDNA), dextrose    Objective:   Vitals:   T-max:  Patient Vitals for the past 8 hrs:   BP Temp Temp src Pulse Resp SpO2   10/29/21 1225 (!) 146/71 98.3 °F (36.8 °C) Oral 62 16 94 %   10/29/21 0819 (!) 147/66 97.9 °F (36.6 °C) Axillary 63 16 94 %       Intake/Output Summary (Last 24 hours) at 10/29/2021 1443  Last data filed at 10/29/2021 1130  Gross per 24 hour   Intake 1281.42 ml   Output 2025 ml   Net -743.58 ml       Review of Systems   All other systems reviewed and are negative. Physical Exam  Constitutional:       Appearance: She is ill-appearing. HENT:      Head: Normocephalic and atraumatic. Eyes:      Extraocular Movements: Extraocular movements intact. Conjunctiva/sclera: Conjunctivae normal.   Cardiovascular:      Rate and Rhythm: Normal rate and regular rhythm. Pulmonary:      Effort: Pulmonary effort is normal.      Breath sounds: Rales present. Comments: improved  Abdominal:      General: Abdomen is flat. Palpations: Abdomen is soft. There is hepatomegaly. Tenderness: There is abdominal tenderness. Comments: RUQ pain, improved   Musculoskeletal:         General: Normal range of motion. Cervical back: Normal range of motion. Right lower leg: Edema present. Left lower leg: Edema present. Comments: improved   Skin:     General: Skin is warm and dry. Neurological:      General: No focal deficit present. Mental Status: She is alert. Psychiatric:         Mood and Affect: Mood normal.         LABS:    CBC:   Recent Labs     10/28/21  0641 10/28/21  0657 10/29/21  0516   WBC 6.9 7.0 6.9   HGB 8.3* 8.3* 8.6*   HCT 25.1* 24.8* 26.1*    387 400   MCV 87.2 87.3 87.2     Renal:    Recent Labs     10/27/21  0754 10/28/21  0657 10/29/21  0516    140  139 139   K 4.9 5.1  5.1 4.8    103  102 102   CO2 32 30  30 32   BUN 32* 39*  40* 37*   CREATININE 1.8* 2.0*  1.9* 1.8*   GLUCOSE 62* 243*  238* 183*   CALCIUM 8.3 8.3  8.2* 8.3   MG  --  1.70* 2.00   PHOS  --  4.9 4.2   ANIONGAP 6 7  7 5     Hepatic:   No results for input(s): AST, ALT, BILITOT, BILIDIR, PROT, LABALBU, ALKPHOS in the last 72 hours. Troponin:   No results for input(s): TROPONINI in the last 72 hours. BNP: No results for input(s): BNP in the last 72 hours. Lipids: No results for input(s): CHOL, HDL in the last 72 hours. Invalid input(s): LDLCALCU, TRIGLYCERIDE  ABGs:    Recent Labs     10/27/21  0625   PHART 7.367   XOW7CDM 60.9*   PO2ART 64.3*   LCZ3QEV 35*   BEART 8.6*   C5RFAUTM 91*   RJS6WWF 37       INR:   No results for input(s): INR in the last 72 hours. Lactate: No results for input(s): LACTATE in the last 72 hours. Cultures:  -----------------------------------------------------------------  RAD:   XR CHEST PORTABLE   Final Result      Coarse multifocal consolidation with significant progression since 10/23/2021. Correlate for viral pneumonia. Stable cardiomediastinal silhouette. Right jugular central venous catheter without change. US ABDOMEN LIMITED   Final Result   IMPRESSION :      Gallbladder sludge without evidence of acute cholecystitis. Mildly coarsened hepatic echotexture. Correlate with liver function tests. FL MODIFIED BARIUM SWALLOW W VIDEO   Final Result      1.   Weak swallowing reflex with delayed AP transit of the bolus.      2.  No aspiration penetration with the various consistencies attempted. Please see speech pathology report for additional findings and recommendations. CT CHEST WO CONTRAST   Final Result   1. No acute intracranial findings. CHEST:         FINDINGS:      Patchy bilateral groundglass opacity and airspace disease in both upper lobes and to a lesser extent in both lower lobes consistent with pneumonia. Small right pleural effusion. No significant left pleural effusion. Normal heart size. No pericardial    effusion. Vascular aorta and main pulmonary artery are normal in caliber. Right IJ venous catheter tip at the cavoatrial junction. There is diffuse anasarca. Small gallstones are noted in the gallbladder. There is a calcification in the tail the    pancreas measuring 1.1 cm which is stable since previous abdominal CT from June 2021. There is a cystic area in the tail the pancreas measuring 2.4 x 1.2 cm which is also unchanged. On previous chest CT from 2011, the cystic area in the tail the pancreas    measured 2.8 x 1.7 cm. No fractures are identified. IMPRESSION:   1. Moderate patchy bilateral airspace disease consistent with multifocal pneumonia including potential atypical viral etiologies. Clinical correlation recommended. 2. Trace right pleural effusion. 3. Anasarca. CT Head WO Contrast   Final Result   1. No acute intracranial findings. CHEST:         FINDINGS:      Patchy bilateral groundglass opacity and airspace disease in both upper lobes and to a lesser extent in both lower lobes consistent with pneumonia. Small right pleural effusion. No significant left pleural effusion. Normal heart size. No pericardial    effusion. Vascular aorta and main pulmonary artery are normal in caliber. Right IJ venous catheter tip at the cavoatrial junction. There is diffuse anasarca. Small gallstones are noted in the gallbladder.  There is a calcification in the tail the pancreas measuring 1.1 cm which is stable since previous abdominal CT from June 2021. There is a cystic area in the tail the pancreas measuring 2.4 x 1.2 cm which is also unchanged. On previous chest CT from 2011, the cystic area in the tail the pancreas    measured 2.8 x 1.7 cm. No fractures are identified. IMPRESSION:   1. Moderate patchy bilateral airspace disease consistent with multifocal pneumonia including potential atypical viral etiologies. Clinical correlation recommended. 2. Trace right pleural effusion. 3. Anasarca. XR CHEST PORTABLE   Final Result   1. Stable diffuse bilateral airspace disease. Assessment/Plan:    Vadim Kirkpatrick is a 59 y/o F w/ hx if RASHMI, COPD, diastolic CHF (EF 83%), DVT (2004), bilateral lower foot wounds w/ partial amputations, osteomyelitis on vancomycin and meropenem, CKD, DM who presents w/ AMS. EMS was called by the patient's daughters due to concern about waxing and waning mental status.     Acute metabolic encephalopathy suspected 2/2 methadone vs hypoglycemia  On arrival to ED patient was only alert to her name. On the floor patient was alert and oriented X4 but was somnolent. Arousalable to verbal stimuli. CKD4 may cause decreased renal clearance of methadone  -Patient has previous hospitalization 4 days ago for similar presentation  -continue on half dose Lantus (10U)--dec to 7 today  -LDSS  -Follow glucose 4x daily  -methadone at dec dose of 80mg    Acute hypoxic respiratory failure 2/2 decreased respiratory drive - improved  Patient has hx of COPD (30 pack year smoking hx). Baseline oxygen 2-3L. Echo 10/21 showed PASP of 60mmHg, EF ~55%, and mild LVH. RR depressed on presentation (as low as 7 breaths per minute).   -Patient was hypoxic on RA at presentation, responsive to 4L on nasal cannula  -Cont home nebulizers, patient received 40 mg furosemide in ED  -BIPAP nightly  -currently on 5L, with improved RR  -continues to have diffuse crackles bilaterally  -received 40 bid x3 doses  -(10/28) 60mg IV lasix, diuresed well  - Coarse multifocal consolidation with significant progression since 10/23/2021.    -likely fluid overload  -nephrology consulted, appreciate recs, cont lasix 40mg bid     Patchy Bilateral airspace disease possibly 2/2 volume overload  Unlikely bacterial due to continuous treatment w/ Vanc / and Merropenem  -COVID pending  -Echo 2018 had EF of 50-55%  -Received diuretics in ED  -40mg oral lasix  -strict I's and O's  -lasix as above     RUQ Pain  Positive Leavitt's sign, hepatomegaly, gallstones on CT  States she quit smoking 20 years ago  -Hepatic function panel: elevated alk phos  -RUQ U/S of gallbladder and liver: gallbladder sludge w/o evidence of acute cholecystitis, mildly coarsened hepatic echo texture  -surgery following, no interventions at this time. Osteomyelitis  Cult polymicrobial with Ps aerug (R cipro), E coli, E faecalis. Seen by ID previously, IV abx ordered (vanc, merrem) through 11/26.   -Cont Vanc and Meropenem  -Patient's dressings were dry, patient reports dressings were changed on day of presentation  -wound vac draining serosanguinous fluid     Elevated Troponin  -0.05 -> 0.04  -Could be secondary to dehydration, CKD  -downtrending     Chronic problems:  DM2   - continue half dose of lantus, LDSS, hypoglycemia protocol, glucose checks QID    CKD4  - avoid nephrotoxic agents, NSAIDs. Daily weights. Maintain SBP >90.     Moderate PAH  - Echo 10/2021 showed PASP of 60mmHg    Hx of DVT   - continue eliquis    Anxiety/Depression  -continue home amitryptiline (consider reducing dose), escitalopram    Code Status:FULL  FEN: Adult regular, 3 carb  PPX: eliquis  DISPO: Anderson Kong MD, PGY-1  10/29/21  2:43 PM    This patient has been staffed and discussed with Héctor Tolbert MD.

## 2021-10-29 NOTE — CARE COORDINATION
CM  following for  D/c planning :  patient plans to d/c to SNF at  D/C < will have Wound vac and  PICC w/ IV atbx >   patient has been accepted at  [facility] ;      Patient was admitted from [facility] requesting diff SNF at  D/C . General Surgery following   Podiatry consulted  :  wound VAC to right lower extremity     CM spoke with patient and daughter  Flori Pilar:  [phone]  Re: disposition and they rare agreeable to [facility] :    [facility]               [address] [place] [address], [address] [place] [address]      REPORT Phone: [phone]      Celsa Jeffries  Fax: [phone]        CM confirmed  Jamia Estrada at  [facility] and are follow , able to accept when medically stable to transfer. Electronically signed by Amaya Rinaldi RN on 10/29/2021 at 4:17 PM       Amaya Rinaldi  RN Case Manager  [facility]  [address] [place] [address].   [facility] [id number] [phone]  Fax [phone]

## 2021-10-30 NOTE — PROGRESS NOTES
Clinical Pharmacy Consult Note    Vancomycin - Management by Pharmacy    Consult Date(s): 10/24/21  Consulting Provider(s): Dr. Prince Saravia / Plan  1) Hx of bilateral foot infection / osteomyelitis - Vancomycin + Meropenem   Concurrent Antimicrobials:   o Meropenem - Day #29   Day of Vanc Therapy: #29   Current Dosing Method: AUC   Therapeutic Goal: 400-600 mg/mL*h   o Currently on 500mg IV q24h. Dose reduced 10/28 after random level showed apparent accumulation. o SCr stable today - will continue 500mg IV q24h. This regimen estimates an AUC = 412 and trough 14. 1.  o Plan to check trough level 10/31 @ 0530.  Will continue to monitor clinical condition and make adjustments to regimen as appropriate. Please call with questions--  Thanks--  Helen Neves PharmD., [facility]   10/30/2021 1:07 PM  Wireless: [phone]        Interval update:  Patient still with O2 requirement - continuing to diurese with IV furosemide today, with possible change to oral tomorrow. Subjective/Objective:  Buck Llanes is a 60 y.o. female with a PMHx significant for obesity, RASHMI, HFrEF, bilateral foot wounds s/p partial amputation, CKD, chronic pain, DM who was recently admitted 10/1-10/16 for bilateral foot wounds, found to have osteomyelitis requiring I&D. Wound cultures grew Pseudomonas, E. Coli and Enterococcus. Pt was discharged on vancomycin and meropenem with recommendations from ID to continue through 11/26. Patient was then admitted 10/18-10/20 with AMS thought to be 2/2 hypoglycemia. Insulin was adjusted, and pt was discharged back to SNF. Pt now represents with AMS thought to be 2/2 hypoglycemia and possible accumulation of Methadone in setting of CKD 4. Also with RUQ pain this admission - imaging showed biliary sludge, but no plans for intervention as no signs of acute cholecystitis per surgery. Pharmacy has been consulted to dose vancomycin.     Pertinent Antimicrobials:  Meropenem 1000 mg IV q12h (10/2-current)   Vancomycin - pharmacy to dose (10/2-current)    (10/15-10/28)   500mg IV q24h (10/29-current)    Of note, known to clinical pharmacy team from earlier two admissions in Oct 2021. Pt was eventually scheduled on vancomycin 750 mg IV q24h with plans to continue through 11/26/21. Regimen predicted AUC of 503 and trough 16.1 as of 10/19/21. Ht Readings from Last 1 Encounters:   10/28/21 5' (1.524 m)      Wt Readings from Last 1 Encounters:   10/30/21 200 lb 13.4 oz (91.1 kg)     Vancomycin Level(s) / Doses:  Date Time Dose Level / Type of Level Interpretation   10/19 0630 750 mg IV q24h Random = 12.3 mcg/mL Drawn ~23h after previous dose. Predicted AUC = 503 & steady state trough + 16.1.   10/24 0851 750mg IV q24h Random = 18.7 mcg/mL Drawn ~24h after previous dose. Predicted  & steady state trough = 17.8.   10/28 0657 750mg IV q24h Random = 20.4 mcg/mL Drawn ~19h after previous dose. Predicts  & steady state trough = 20.5.   10/31 0530 500mg IV q24h Trough = ordered    Note: Serum levels collected for AUC-based dosing may be high if collected in close proximity to the dose administered. This is not necessarily an indicator of toxicity. Recent Labs     10/28/21  0657 10/29/21  0516 10/30/21  0700   CREATININE 2.0*  1.9* 1.8* 1.7*   BUN 39*  40* 37* 35*   WBC 7.0 6.9 6.5       Estimated Creatinine Clearance: 36 mL/min (A) (based on SCr of 1.7 mg/dL (H)).     Cultures & Sensitivities:  Date Site Micro Susceptibility / Result   10/24 Blood x2 No growth to date    10/14 Rapid flu negative

## 2021-10-30 NOTE — PROGRESS NOTES
Progress Note    Admit Date: 10/23/2021  Day: 8  Diet: ADULT DIET; Easy to Chew; 3 carb choices (45 gm/meal); Low Fat/Low Chol/High Fiber/2 gm Na; Low Sodium (2 gm)  ADULT ORAL NUTRITION SUPPLEMENT; Lunch, Dinner, Breakfast; Clear Liquid Oral Supplement    CC: AMS    Interval history: NAEO. Doing better this morning. SOB improved. UOP 3.2 L. Sh  HPI:   Warren Galloway is a 59 y/o F w/ hx if RASHMI, COPD, diastolic HF, DVT (3224), bilateral lower foot wounds w/ partial amputations, osteomyelitis on vancomycin and meropenem, CKD, DM who presents w/ AMS. EMS was called by the patient's daughters due to concern about waxing and waning mental status. The patient was recently discharged on 10/16 during which she was diagnosed w/ osteomyelitis of feet and given would care, multiple I&D, and antibiotic therapy. She was then discharged to a skilled nursing facility. She was readmitted on 10/18 due to being found unresponsive after receiving 140 mg of methadone in the morning and she was also found to have Bg of 30 and was given glucagon at that time. Patient was started on D50 and IVF and podiatry performed an I&D w/ partial cuboid resection. The patient was discharged on 10/20 after mental status returned to A&OX4. Tonight, in the emergency department the patient was alert to name only. The patient was hypoxic on room air and was responsive to 4L nasal cannula. Per EMR the patient's baseline is 2-3 liters. The patient's blood glucose was found to be 65, K was 5.2 (no EKG changes), and Creatine was 1.9 (baseline 1.4-1.7). The patient was admitted to the medicine floor for further workup and care.      Medications:     Scheduled Meds:   furosemide  40 mg IntraVENous BID    vancomycin  500 mg IntraVENous Q24H    insulin glargine  7 Units SubCUTAneous Daily    methadone  80 mg Oral Daily    metoprolol tartrate  25 mg Oral BID    sodium chloride flush  5-40 mL IntraVENous 2 times per day    amitriptyline  100 mg Oral Nightly    aspirin  81 mg Oral Daily    atorvastatin  20 mg Oral Nightly    apixaban  5 mg Oral BID    escitalopram  10 mg Oral Daily    miconazole   Topical BID    pantoprazole  40 mg Oral Daily    meropenem (MERREM) 1000 mg IVPB (mini-bag)  1,000 mg IntraVENous 2 times per day    insulin lispro  0-6 Units SubCUTAneous TID WC    insulin lispro  0-3 Units SubCUTAneous Nightly     Continuous Infusions:   sodium chloride Stopped (10/30/21 0706)    dextrose       PRN Meds:sodium chloride, naloxone, sodium chloride flush, sodium chloride, ondansetron **OR** ondansetron, polyethylene glycol, acetaminophen **OR** acetaminophen, glucose, dextrose, glucagon (rDNA), dextrose    Objective:   Vitals:   T-max:  Patient Vitals for the past 8 hrs:   BP Temp Temp src Pulse Resp SpO2 Weight   10/30/21 0658 (!) 143/62 98.1 °F (36.7 °C) Axillary 62 8 95 %    10/30/21 0551      97 %    10/30/21 0302     20 94 %    10/30/21 0214       200 lb 13.4 oz (91.1 kg)   10/30/21 0146      93 %    10/29/21 2318      95 %        Intake/Output Summary (Last 24 hours) at 10/30/2021 5585  Last data filed at 10/30/2021 0706  Gross per 24 hour   Intake 746.53 ml   Output 3850 ml   Net -3103.47 ml       Review of Systems   All other systems reviewed and are negative. Physical Exam  Constitutional:       Appearance: She is ill-appearing. HENT:      Head: Normocephalic and atraumatic. Eyes:      Extraocular Movements: Extraocular movements intact. Conjunctiva/sclera: Conjunctivae normal.   Cardiovascular:      Rate and Rhythm: Normal rate and regular rhythm. Pulmonary:      Effort: Pulmonary effort is normal.      Breath sounds: Rales present. Comments: improved  Abdominal:      General: Abdomen is flat. Palpations: Abdomen is soft. There is hepatomegaly. Tenderness: There is abdominal tenderness. Comments: RUQ pain, improved   Musculoskeletal:         General: Normal range of motion. Cervical back: Normal range of motion. Right lower leg: Edema present. Left lower leg: Edema present. Comments: improved   Skin:     General: Skin is warm and dry. Neurological:      General: No focal deficit present. Mental Status: She is alert. Psychiatric:         Mood and Affect: Mood normal.       LABS:    CBC:   Recent Labs     10/28/21  0641 10/28/21  0657 10/29/21  0516   WBC 6.9 7.0 6.9   HGB 8.3* 8.3* 8.6*   HCT 25.1* 24.8* 26.1*    387 400   MCV 87.2 87.3 87.2     Renal:    Recent Labs     10/27/21  0754 10/28/21  0657 10/29/21  0516    140  139 139   K 4.9 5.1  5.1 4.8    103  102 102   CO2 32 30  30 32   BUN 32* 39*  40* 37*   CREATININE 1.8* 2.0*  1.9* 1.8*   GLUCOSE 62* 243*  238* 183*   CALCIUM 8.3 8.3  8.2* 8.3   MG  --  1.70* 2.00   PHOS  --  4.9 4.2   ANIONGAP 6 7  7 5     Hepatic:   No results for input(s): AST, ALT, BILITOT, BILIDIR, PROT, LABALBU, ALKPHOS in the last 72 hours. Troponin:   No results for input(s): TROPONINI in the last 72 hours. BNP: No results for input(s): BNP in the last 72 hours. Lipids: No results for input(s): CHOL, HDL in the last 72 hours. Invalid input(s): LDLCALCU, TRIGLYCERIDE  ABGs:    No results for input(s): PHART, DMS4YMT, PO2ART, WRN6IVJ, BEART, THGBART, Y4ZUVANS, EDN0KZY in the last 72 hours. INR:   No results for input(s): INR in the last 72 hours. Lactate: No results for input(s): LACTATE in the last 72 hours. Cultures:  -----------------------------------------------------------------  RAD:   XR CHEST PORTABLE   Final Result      Coarse multifocal consolidation with significant progression since 10/23/2021. Correlate for viral pneumonia. Stable cardiomediastinal silhouette. Right jugular central venous catheter without change. US ABDOMEN LIMITED   Final Result   IMPRESSION :      Gallbladder sludge without evidence of acute cholecystitis. Mildly coarsened hepatic echotexture. Correlate with liver function tests. FL MODIFIED BARIUM SWALLOW W VIDEO   Final Result      1. Weak swallowing reflex with delayed AP transit of the bolus. 2.  No aspiration penetration with the various consistencies attempted. Please see speech pathology report for additional findings and recommendations. CT CHEST WO CONTRAST   Final Result   1. No acute intracranial findings. CHEST:         FINDINGS:      Patchy bilateral groundglass opacity and airspace disease in both upper lobes and to a lesser extent in both lower lobes consistent with pneumonia. Small right pleural effusion. No significant left pleural effusion. Normal heart size. No pericardial    effusion. Vascular aorta and main pulmonary artery are normal in caliber. Right IJ venous catheter tip at the cavoatrial junction. There is diffuse anasarca. Small gallstones are noted in the gallbladder. There is a calcification in the tail the    pancreas measuring 1.1 cm which is stable since previous abdominal CT from June 2021. There is a cystic area in the tail the pancreas measuring 2.4 x 1.2 cm which is also unchanged. On previous chest CT from 2011, the cystic area in the tail the pancreas    measured 2.8 x 1.7 cm. No fractures are identified. IMPRESSION:   1. Moderate patchy bilateral airspace disease consistent with multifocal pneumonia including potential atypical viral etiologies. Clinical correlation recommended. 2. Trace right pleural effusion. 3. Anasarca. CT Head WO Contrast   Final Result   1. No acute intracranial findings. CHEST:         FINDINGS:      Patchy bilateral groundglass opacity and airspace disease in both upper lobes and to a lesser extent in both lower lobes consistent with pneumonia. Small right pleural effusion. No significant left pleural effusion. Normal heart size. No pericardial    effusion. Vascular aorta and main pulmonary artery are normal in caliber.   Right IJ venous catheter tip at the cavoatrial junction. There is diffuse anasarca. Small gallstones are noted in the gallbladder. There is a calcification in the tail the    pancreas measuring 1.1 cm which is stable since previous abdominal CT from June 2021. There is a cystic area in the tail the pancreas measuring 2.4 x 1.2 cm which is also unchanged. On previous chest CT from 2011, the cystic area in the tail the pancreas    measured 2.8 x 1.7 cm. No fractures are identified. IMPRESSION:   1. Moderate patchy bilateral airspace disease consistent with multifocal pneumonia including potential atypical viral etiologies. Clinical correlation recommended. 2. Trace right pleural effusion. 3. Anasarca. XR CHEST PORTABLE   Final Result   1. Stable diffuse bilateral airspace disease. Assessment/Plan:    Ha Yarbrough is a 59 y/o F w/ hx if RASHMI, COPD, diastolic CHF (EF 57%), DVT (2004), bilateral lower foot wounds w/ partial amputations, osteomyelitis on vancomycin and meropenem, CKD, DM who presents w/ AMS. EMS was called by the patient's daughters due to concern about waxing and waning mental status.     Acute metabolic encephalopathy suspected 2/2 methadone vs hypoglycemia--resolved  On arrival to ED patient was only alert to her name. On the floor patient was alert and oriented X4 but was somnolent. Arousalable to verbal stimuli. CKD3 may cause decreased renal clearance of methadone  -Patient has previous hospitalization 4 days ago for similar presentation  -continue on half dose Lantus (10U)--dec to 7 today  -LDSS  -Follow glucose 4x daily  -methadone at dec dose of 80mg    Acute hypoxic respiratory failure 2/2 decreased respiratory drive - improved  Patient has hx of COPD (30 pack year smoking hx). Baseline oxygen 2-3L. Echo 10/21 showed PASP of 60mmHg, EF ~55%, and mild LVH. RR depressed on presentation (as low as 7 breaths per minute).   -Patient was hypoxic on RA at presentation, responsive to 4L on nasal cannula  -Cont home nebulizers, patient received 40 mg furosemide in ED  -BIPAP nightly  -currently on 3L  -continues to have diffuse crackles bilaterally  -received 40 bid x3 doses  -(10/28) 60mg IV lasix, diuresed well  - CXR: Coarse multifocal consolidation with significant progression since 10/23/2021.    -likely fluid overload  -nephrology consulted, appreciate recs  - cont lasix 40mg bid for one more day, transition to po tommorow     Patchy Bilateral airspace disease possibly 2/2 volume overload  Unlikely bacterial due to continuous treatment w/ Vanc / and Merropenem  -COVID negative  -Echo 2018 had EF of 50-55%  -Received diuretics in ED  -lasix as above  -strict I's and O's    RUQ Pain  Hepatomegaly, gallstones on CT  States she quit smoking 20 years ago  -Hepatic function panel: elevated alk phos  -RUQ U/S of gallbladder and liver: gallbladder sludge w/o evidence of acute cholecystitis, mildly coarsened hepatic echo texture  -surgery following, no interventions at this time. FAHEEM on CKD 3  Baseline Cr 1.4-1.7  avoid nephrotoxic agents, NSAIDs. Daily weights. Maintain SBP >90.  -pt back at baseline  -nephrology following, appreciate recs  -cont lasix 40 IV bid    Osteomyelitis  Cult polymicrobial with Ps aerug (R cipro), E coli, E faecalis.  Seen by ID previously, IV abx ordered (vanc, merrem) through 11/26.   -Cont Vanc and Meropenem  -Patient's dressings were dry, patient reports dressings were changed on day of presentation  -wound vac  -podiatry following     Elevated Troponin--resolved  -0.05 -> 0.04  -Could be secondary to dehydration, CKD    Chronic problems:  DM2   - continue half dose of lantus, LDSS, hypoglycemia protocol, glucose checks QID    Anemia    Moderate PAH  - Echo 10/2021 showed PASP of 60mmHg    Hx of DVT   - continue eliquis    Anxiety/Depression  -continue home amitryptiline (consider reducing dose), escitalopram    Code Status:FULL  FEN: Adult regular, 3 carb  PPX:

## 2021-10-30 NOTE — PROGRESS NOTES
Pt awake but states that she is not ready for BIPAP or sleep yet. She will notify RN when she is ready.

## 2021-10-30 NOTE — PLAN OF CARE
D: C/o pain R-side of abd during turning & repositioning, but no c/o pain bi le last pm or overnight. Tolerated having o2 decreased to 3lpnc and using bipap with 3l overnight. Resp rate 8 while sleeping, but aroused easily. Stressed importance of increasing protein intake and changed supplement drinks to clear since didn't like Ensure, pudding, or frozen. A: Cont to monitor during hourly rounds    Problem: Pain:  Description: Pain management should include both nonpharmacologic and pharmacologic interventions.   Goal: Pain level will decrease  Description: Pain level will decrease  Outcome: Ongoing  Goal: Control of acute pain  Description: Control of acute pain  Outcome: Ongoing  Goal: Control of chronic pain  Description: Control of chronic pain  Outcome: Ongoing     Problem: Nutrition  Goal: Optimal nutrition therapy  Outcome: Ongoing

## 2021-10-30 NOTE — PLAN OF CARE
Problem: Pain:  Goal: Control of chronic pain  Description: Control of chronic pain  10/30/2021 1610 by Haley Levine RN  Note: She is on scheduled methadone. She has slept most of the shift. Arouses easily. Problem: Nutrition  Goal: Optimal nutrition therapy  10/30/2021 1610 by Haley Levine RN  Note: Patient picks at food. Supplements ordered. Problem: Falls - Risk of:  Goal: Will remain free from falls  Description: Will remain free from falls  Note: She is a fall risk. Door open, and bed alarm on. Call light in reach. She calls for assistance. She does not attempt out of bed. Will monitor. Problem: Skin Integrity:  Goal: Absence of new skin breakdown  Description: Absence of new skin breakdown  Note: She has dressings to BLE. Wound vac in place to RLE. Podiatry following. Right groin, and right side of abdominal panus red and moist. Miconazole powder applied. She spends most of her time leaning to that side. Encouraged to try and change position to allow air to that area. Problem: Gas Exchange - Impaired:  Goal: Levels of oxygenation will improve  Description: Levels of oxygenation will improve  Note: Pulse ox in the 90's on 3L. Respirations easy at rest, and rate drops when sleeping. Crackles at bases. Will monitor.

## 2021-10-30 NOTE — PROGRESS NOTES
Surgery Daily Progress Note      CC: RUQ abdominal pain    SUBJECTIVE:  Patient resting comfortably this morning. Still complaining of intermittent abdominal pain. Tolerating her diet. Denies any nausea or vomiting. ROS:   A 14 point review of systems was conducted, significant findings as noted above. All other systems negative. OBJECTIVE:    PHYSICAL EXAM:  Vitals:    10/30/21 0146 10/30/21 0214 10/30/21 0302 10/30/21 0551   BP:       Pulse:       Resp:   20    Temp:       TempSrc:       SpO2: 93%  94% 97%   Weight:  200 lb 13.4 oz (91.1 kg)     Height:           General appearance: drowsy; difficult to arouse  Chest/Lungs: normal respiratory effort, on BiPap  Cardiovascular: RRR  Abdomen: morbidly obese, soft, mildly tender to palpation in RUQ, non-distended, no guarding/rigidity  Skin: warm and dry  Extremities: B/L lower extremities with ACE bandage wrapping  Neuro: arousable, no focal deficits      ASSESSMENT & PLAN:   Norman Barnes is a 60 y.o. female with history of COPD, RASHMI, diastolic CHF (EF 89%), previous DVTs (on Eliquis), CKD, and DM who is admitted to [facility] for altered mental status. Our service has been consulted due to concern for RUQ abdominal pain. - No indication for surgical intervention this admission, as patient does not have acute cholecystitis.  - Okay to continue diet. Changed to low fat diet to assist with symptoms.  - Further management per medical team.  - At discharge, okay to follow up with general surgery for possible elective cholecystectomy once she recovers from her acute illness. General surgery will continue to follow while in house. Nidia Yepez DO, MS  PGY1, General Surgery  10/30/21  6:22 AM    I am post-call today and will not be on campus. Please contact Dr. Seble Murillo at [phone] for questions or concerns regarding this patient.

## 2021-10-30 NOTE — PROGRESS NOTES
Nephrology Progress Note          Admit Date: 10/23/2021  Diet: ADULT DIET; Easy to Chew; 3 carb choices (45 gm/meal); Low Fat/Low Chol/High Fiber/2 gm Na;  Low Sodium (2 gm)  ADULT ORAL NUTRITION SUPPLEMENT; Lunch, Dinner, Breakfast; Clear Liquid Oral Supplement    Chief Complaint: FAHEEM    INTERVAL HISTORY    Feels better  Shortness of breath: Yes  UOP: Good   Creat: stable              Medications:     Scheduled Meds:   magnesium sulfate  2,000 mg IntraVENous Once    furosemide  40 mg IntraVENous BID    [START ON 10/31/2021] furosemide  40 mg Oral BID    vancomycin  500 mg IntraVENous Q24H    insulin glargine  7 Units SubCUTAneous Daily    methadone  80 mg Oral Daily    metoprolol tartrate  25 mg Oral BID    sodium chloride flush  5-40 mL IntraVENous 2 times per day    amitriptyline  100 mg Oral Nightly    aspirin  81 mg Oral Daily    atorvastatin  20 mg Oral Nightly    apixaban  5 mg Oral BID    escitalopram  10 mg Oral Daily    miconazole   Topical BID    pantoprazole  40 mg Oral Daily    meropenem (MERREM) 1000 mg IVPB (mini-bag)  1,000 mg IntraVENous 2 times per day    insulin lispro  0-6 Units SubCUTAneous TID WC    insulin lispro  0-3 Units SubCUTAneous Nightly     Continuous Infusions:   sodium chloride Stopped (10/30/21 0706)    dextrose       PRN Meds:sodium chloride, naloxone, sodium chloride flush, sodium chloride, ondansetron **OR** ondansetron, polyethylene glycol, acetaminophen **OR** acetaminophen, glucose, dextrose, glucagon (rDNA), dextrose    Objective:   Vitals:   T-max:  Patient Vitals for the past 8 hrs:   BP Temp Temp src Pulse Resp SpO2   10/30/21 0914 (!) 157/64 97.4 °F (36.3 °C) Oral 71 16 95 %   10/30/21 0807      92 %   10/30/21 0658 (!) 143/62 98.1 °F (36.7 °C) Axillary 62 8 95 %   10/30/21 0551      97 %   10/30/21 0302     20 94 %       Intake/Output Summary (Last 24 hours) at 10/30/2021 1040  Last data filed at 10/30/2021 0949  Gross per 24 hour Intake 866.53 ml   Output 3850 ml   Net -2983.47 ml       Review of Systems   Constitutional: Negative for activity change, appetite change, chills, diaphoresis, fever and unexpected weight change. HENT: Negative for congestion and sore throat. Eyes: Negative for visual disturbance. Respiratory: Negative for apnea, cough, chest tightness and shortness of breath. Cardiovascular: Negative for chest pain, palpitations and leg swelling. Gastrointestinal: Negative for abdominal pain, constipation, diarrhea, nausea and vomiting. Endocrine: Negative for cold intolerance, heat intolerance, polydipsia, polyphagia and polyuria. Genitourinary: Negative for difficulty urinating. Musculoskeletal: Negative for arthralgias and myalgias. Neurological: Negative for weakness and headaches. Psychiatric/Behavioral: Negative for confusion and sleep disturbance. All other systems reviewed and are negative. Physical Exam  Vitals reviewed. Constitutional:       General: She is not in acute distress. Appearance: She is well-developed and well-groomed. HENT:      Head: Normocephalic and atraumatic. Mouth/Throat:      Mouth: Mucous membranes are moist.      Pharynx: Oropharynx is clear. Eyes:      General: No scleral icterus. Extraocular Movements: Extraocular movements intact. Conjunctiva/sclera: Conjunctivae normal.      Pupils: Pupils are equal, round, and reactive to light. Cardiovascular:      Rate and Rhythm: Normal rate and regular rhythm. Pulses: Normal pulses. Heart sounds: Normal heart sounds, S1 normal and S2 normal. No murmur heard. Pulmonary:      Effort: Pulmonary effort is normal. No respiratory distress. Breath sounds: Normal air entry. Rales present. Abdominal:      General: Abdomen is flat. Bowel sounds are normal.      Palpations: Abdomen is soft. Tenderness: There is no abdominal tenderness.    Musculoskeletal:         General: Normal range of motion. Cervical back: Full passive range of motion without pain, normal range of motion and neck supple. Skin:     General: Skin is warm and dry. Neurological:      General: No focal deficit present. Mental Status: She is alert and oriented to person, place, and time. Cranial Nerves: Cranial nerves are intact. Sensory: Sensation is intact. Motor: Motor function is intact. Coordination: Coordination is intact. Gait: Gait is intact. Deep Tendon Reflexes: Reflexes are normal and symmetric. Psychiatric:         Attention and Perception: Attention and perception normal.         Mood and Affect: Mood normal.         Speech: Speech normal.         Behavior: Behavior normal. Behavior is cooperative. Thought Content: Thought content normal.         Cognition and Memory: Cognition and memory normal.         Judgment: Judgment normal.         LABS:    CBC:   Recent Labs     10/28/21  0657 10/29/21  0516 10/30/21  0700   WBC 7.0 6.9 6.5   HGB 8.3* 8.6* 8.4*   HCT 24.8* 26.1* 25.3*    400 399   MCV 87.3 87.2 86.6     Renal:    Recent Labs     10/28/21  0657 10/29/21  0516 10/30/21  0700     139 139 137   K 5.1  5.1 4.8 4.8     102 102 100   CO2 30  30 32 33*   BUN 39*  40* 37* 35*   CREATININE 2.0*  1.9* 1.8* 1.7*   GLUCOSE 243*  238* 183* 133*   CALCIUM 8.3  8.2* 8.3 8.3   MG 1.70* 2.00 1.70*   PHOS 4.9 4.2 3.8   ANIONGAP 7  7 5 4     Hepatic:   Recent Labs     10/30/21  0700   LABALBU 2.0*     Troponin: No results for input(s): TROPONINI in the last 72 hours. BNP: No results for input(s): BNP in the last 72 hours. Lipids: No results for input(s): CHOL, HDL in the last 72 hours. Invalid input(s): LDLCALCU, TRIGLYCERIDE  ABGs:    No results for input(s): PHART, DTG9YIB, PO2ART, PDU8GYF, BEART, THGBART, B2ITKXUY, GRI3PWB in the last 72 hours. INR: No results for input(s): INR in the last 72 hours.   Lactate: No results for input(s): LACTATE in the last 72 hours. Cultures:  -----------------------------------------------------------------  RAD:   XR CHEST PORTABLE   Final Result      Coarse multifocal consolidation with significant progression since 10/23/2021. Correlate for viral pneumonia. Stable cardiomediastinal silhouette. Right jugular central venous catheter without change. US ABDOMEN LIMITED   Final Result   IMPRESSION :      Gallbladder sludge without evidence of acute cholecystitis. Mildly coarsened hepatic echotexture. Correlate with liver function tests. FL MODIFIED BARIUM SWALLOW W VIDEO   Final Result      1. Weak swallowing reflex with delayed AP transit of the bolus. 2.  No aspiration penetration with the various consistencies attempted. Please see speech pathology report for additional findings and recommendations. CT CHEST WO CONTRAST   Final Result   1. No acute intracranial findings. CHEST:         FINDINGS:      Patchy bilateral groundglass opacity and airspace disease in both upper lobes and to a lesser extent in both lower lobes consistent with pneumonia. Small right pleural effusion. No significant left pleural effusion. Normal heart size. No pericardial    effusion. Vascular aorta and main pulmonary artery are normal in caliber. Right IJ venous catheter tip at the cavoatrial junction. There is diffuse anasarca. Small gallstones are noted in the gallbladder. There is a calcification in the tail the    pancreas measuring 1.1 cm which is stable since previous abdominal CT from June 2021. There is a cystic area in the tail the pancreas measuring 2.4 x 1.2 cm which is also unchanged. On previous chest CT from 2011, the cystic area in the tail the pancreas    measured 2.8 x 1.7 cm. No fractures are identified. IMPRESSION:   1. Moderate patchy bilateral airspace disease consistent with multifocal pneumonia including potential atypical viral etiologies.  Clinical correlation recommended. 2. Trace right pleural effusion. 3. Anasarca. CT Head WO Contrast   Final Result   1. No acute intracranial findings. CHEST:         FINDINGS:      Patchy bilateral groundglass opacity and airspace disease in both upper lobes and to a lesser extent in both lower lobes consistent with pneumonia. Small right pleural effusion. No significant left pleural effusion. Normal heart size. No pericardial    effusion. Vascular aorta and main pulmonary artery are normal in caliber. Right IJ venous catheter tip at the cavoatrial junction. There is diffuse anasarca. Small gallstones are noted in the gallbladder. There is a calcification in the tail the    pancreas measuring 1.1 cm which is stable since previous abdominal CT from June 2021. There is a cystic area in the tail the pancreas measuring 2.4 x 1.2 cm which is also unchanged. On previous chest CT from 2011, the cystic area in the tail the pancreas    measured 2.8 x 1.7 cm. No fractures are identified. IMPRESSION:   1. Moderate patchy bilateral airspace disease consistent with multifocal pneumonia including potential atypical viral etiologies. Clinical correlation recommended. 2. Trace right pleural effusion. 3. Anasarca. XR CHEST PORTABLE   Final Result   1. Stable diffuse bilateral airspace disease. Assessment/Plan:     FAHEEM on CKD 3  Patient had a creatinine of 2.0 on presentation. Now 1.8 after reciving IV lasix. Baseline appears to be 1.4-1.7. · Not currently on IV fluids  · Encourage p.o. intake  · Continue  Lasix 40 IV BID  · Patient takes 40 PO lasix daily at home. · Avoid nephrotoxic agents    Hypertension  · Lopressor 25 mg twice daily  · Holding home diuretics    Anemia  Patient with chronic anemia.      Osteomyelitis  Podiatry consulted  · On Vancomycin and Merem  · Monitor Vanc level      Thomasena Duverney, MD  10/30/2021    [facility] [address]  Office : [phone]  Fax :[phone]

## 2021-10-31 NOTE — CARE COORDINATION
SW Following, Dc order noted, SW spoke w/[facility], they were not following the Pt and were not ready for her to come to the facility today, they are able to accept the Pt tomorrow when they will have a room set up and staff available, [facility] called [facility] and [facility] the Pt's transport too tomorrow at 3 pm    Electronically signed by SAURABH Daniel, JACQUIE on 10/31/2021 at 1:09 PM   [phone]

## 2021-10-31 NOTE — PLAN OF CARE
Problem: Falls - Risk of:  Goal: Will remain free from falls  Description: Will remain free from falls  10/31/2021 0339 by Milana Bucio RN  Outcome: Ongoing  Note: Pt does not attempt to get out of bed. Safety precautions in place. No falls this night shift. Problem: Falls - Risk of:  Goal: Absence of physical injury  Description: Absence of physical injury  Outcome: Ongoing  Note: No falls or injuries to date this hospitalization. Problem: Skin Integrity:  Goal: Will show no infection signs and symptoms  Description: Will show no infection signs and symptoms  Outcome: Ongoing  Note: Fungal infection/rash noted to groin folds. Micotin powder applied. Problem: Pain:  Goal: Pain level will decrease  Description: Pain level will decrease  Outcome: Ongoing  Note: Pt denies pain, except when being rolled side to side to be cleaned up. States this causes discomfort in her ribs. Problem: Nutrition  Goal: Optimal nutrition therapy  10/31/2021 0339 by Milana Bucio RN  Outcome: Ongoing  Note: Ate more than 75% of dinner last night. Problem: Gas Exchange - Impaired:  Goal: Levels of oxygenation will improve  Description: Levels of oxygenation will improve  10/31/2021 0339 by Milana Bucio RN  Outcome: Ongoing  Note: O2 saturation adequate on supplemental oxygen.

## 2021-10-31 NOTE — PROGRESS NOTES
Surgery Daily Progress Note      CC: RUQ abdominal pain    SUBJECTIVE:  NAEON. Pt states havinh minimal abominal pain. Tolerating diet, passing gas, having BMs    ROS:   A 14 point review of systems was conducted, significant findings as noted above. All other systems negative. OBJECTIVE:    PHYSICAL EXAM:  Vitals:    10/30/21 1503 10/30/21 2113 10/31/21 0041 10/31/21 0348   BP: (!) 167/61 (!) 149/60 (!) 144/63 (!) 151/61   Pulse: 70 67 69 69   Resp: 14 16 14 14   Temp: 98.7 °F (37.1 °C) 98.6 °F (37 °C) 98.6 °F (37 °C) 98.2 °F (36.8 °C)   TempSrc: Oral Oral Oral Axillary   SpO2: 92% 93% 92% 92%   Weight:       Height:           General appearance: drowsy; Chest/Lungs: normal respiratory effort,   Cardiovascular: RRR  Abdomen: morbidly obese, soft, mildly tender to palpation in RUQ, non-distended, no guarding/rigidity  Skin: warm and dry  Extremities: B/L lower extremities with ACE bandage wrapping  Neuro: arousable, no focal deficits      ASSESSMENT & PLAN:   Tara Warren is a 60 y.o. 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 Our service has been consulted due to concern for RUQ abdominal pain. - No indication for surgical intervention this admission, as patient does not have acute cholecystitis.  - Okay to continue diet. - Further management per medical team.  - At discharge, okay to follow up with general surgery for possible elective cholecystectomy once she recovers from her acute illness. General surgery will continue to follow while in house.     Héctor Martinez MD, PGY-1  10/31/21 6:18 AM  Pager: [phone]

## 2021-10-31 NOTE — PROGRESS NOTES
Nephrology Progress Note          Admit Date: 10/23/2021  Diet: ADULT ORAL NUTRITION SUPPLEMENT; Lunch, Dinner, Breakfast; Clear Liquid Oral Supplement  ADULT DIET; Easy to Chew; 3 carb choices (45 gm/meal); Low Fat/Low Chol/High Fiber/2 gm Na; Low Sodium (2 gm);  Low Potassium (Less than 3000 mg/day)    Chief Complaint: FAHEEM    INTERVAL HISTORY    Feels better  Shortness of breath: No   UOP: Good   Creat: stable              Medications:     Scheduled Meds:   magnesium sulfate  2,000 mg IntraVENous Once    chlorothiazide (DIURIL) IVPB  250 mg IntraVENous Q12H    furosemide  40 mg Oral BID    polyethylene glycol  17 g Oral Daily    senna  1 tablet Oral Nightly    vancomycin  500 mg IntraVENous Q24H    insulin glargine  7 Units SubCUTAneous Daily    methadone  80 mg Oral Daily    metoprolol tartrate  25 mg Oral BID    sodium chloride flush  5-40 mL IntraVENous 2 times per day    amitriptyline  100 mg Oral Nightly    aspirin  81 mg Oral Daily    atorvastatin  20 mg Oral Nightly    apixaban  5 mg Oral BID    escitalopram  10 mg Oral Daily    miconazole   Topical BID    pantoprazole  40 mg Oral Daily    meropenem (MERREM) 1000 mg IVPB (mini-bag)  1,000 mg IntraVENous 2 times per day    insulin lispro  0-6 Units SubCUTAneous TID WC    insulin lispro  0-3 Units SubCUTAneous Nightly     Continuous Infusions:   sodium chloride 25 mL (10/31/21 0618)    dextrose       PRN Meds:sodium chloride, naloxone, sodium chloride flush, sodium chloride, ondansetron **OR** ondansetron, acetaminophen **OR** acetaminophen, glucose, dextrose, glucagon (rDNA), dextrose    Objective:   Vitals:   T-max:  Patient Vitals for the past 8 hrs:   BP Temp Temp src Pulse Resp SpO2   10/31/21 0748 (!) 166/57 98.6 °F (37 °C) Axillary 71 14 93 %   10/31/21 0348 (!) 151/61 98.2 °F (36.8 °C) Axillary 69 14 92 %       Intake/Output Summary (Last 24 hours) at 10/31/2021 0925  Last data filed at 10/31/2021 0600  Gross per 24 hour   Intake 920 ml   Output 2450 ml   Net -1530 ml       Constitutional:  awake, NAD  HEENT:  MMM  Neck: supple, no JVD  Cardiovascular:  S1, S2 reg  Respiratory: Diminished at bases   Abdomen:  +BS, soft, ND  Ext: + lower extremity edema  Skin: dry/intact  CNS: alert, no agitation         LABS:    CBC:   Recent Labs     10/29/21  0516 10/30/21  0700 10/31/21  0501   WBC 6.9 6.5 8.3   HGB 8.6* 8.4* 9.3*   HCT 26.1* 25.3* 27.5*    399 426   MCV 87.2 86.6 84.7     Renal:    Recent Labs     10/29/21  0516 10/30/21  0700 10/31/21  0501    137 137   K 4.8 4.8 5.3*    100 98*   CO2 32 33* 31   BUN 37* 35* 33*   CREATININE 1.8* 1.7* 1.5*   GLUCOSE 183* 133* 177*   CALCIUM 8.3 8.3 8.9   MG 2.00 1.70* 1.80   PHOS 4.2 3.8 3.5   ANIONGAP 5 4 8     Hepatic:   Recent Labs     10/30/21  0700 10/31/21  0501   LABALBU 2.0* 2.2*     Troponin: No results for input(s): TROPONINI in the last 72 hours. BNP: No results for input(s): BNP in the last 72 hours. Lipids: No results for input(s): CHOL, HDL in the last 72 hours. Invalid input(s): LDLCALCU, TRIGLYCERIDE  ABGs:    No results for input(s): PHART, OZN6EHF, PO2ART, BIN7BNO, BEART, THGBART, V7PPBFCT, QXZ3MBC in the last 72 hours. INR: No results for input(s): INR in the last 72 hours. Lactate: No results for input(s): LACTATE in the last 72 hours. Cultures:  -----------------------------------------------------------------  RAD:   XR CHEST PORTABLE   Final Result      Coarse multifocal consolidation with significant progression since 10/23/2021. Correlate for viral pneumonia. Stable cardiomediastinal silhouette. Right jugular central venous catheter without change. US ABDOMEN LIMITED   Final Result   IMPRESSION :      Gallbladder sludge without evidence of acute cholecystitis. Mildly coarsened hepatic echotexture. Correlate with liver function tests. FL MODIFIED BARIUM SWALLOW W VIDEO   Final Result      1.   Weak swallowing reflex with delayed AP transit of the bolus. 2.  No aspiration penetration with the various consistencies attempted. Please see speech pathology report for additional findings and recommendations. CT CHEST WO CONTRAST   Final Result   1. No acute intracranial findings. CHEST:         FINDINGS:      Patchy bilateral groundglass opacity and airspace disease in both upper lobes and to a lesser extent in both lower lobes consistent with pneumonia. Small right pleural effusion. No significant left pleural effusion. Normal heart size. No pericardial    effusion. Vascular aorta and main pulmonary artery are normal in caliber. Right IJ venous catheter tip at the cavoatrial junction. There is diffuse anasarca. Small gallstones are noted in the gallbladder. There is a calcification in the tail the    pancreas measuring 1.1 cm which is stable since previous abdominal CT from June 2021. There is a cystic area in the tail the pancreas measuring 2.4 x 1.2 cm which is also unchanged. On previous chest CT from 2011, the cystic area in the tail the pancreas    measured 2.8 x 1.7 cm. No fractures are identified. IMPRESSION:   1. Moderate patchy bilateral airspace disease consistent with multifocal pneumonia including potential atypical viral etiologies. Clinical correlation recommended. 2. Trace right pleural effusion. 3. Anasarca. CT Head WO Contrast   Final Result   1. No acute intracranial findings. CHEST:         FINDINGS:      Patchy bilateral groundglass opacity and airspace disease in both upper lobes and to a lesser extent in both lower lobes consistent with pneumonia. Small right pleural effusion. No significant left pleural effusion. Normal heart size. No pericardial    effusion. Vascular aorta and main pulmonary artery are normal in caliber. Right IJ venous catheter tip at the cavoatrial junction. There is diffuse anasarca. Small gallstones are noted in the gallbladder.  There is a

## 2021-10-31 NOTE — PROGRESS NOTES
Clinical Pharmacy Consult Note    Vancomycin - Management by Pharmacy    Consult Date(s): 10/24/21  Consulting Provider(s): Dr. Bharat Cota / Plan  1) Hx of bilateral foot infection / osteomyelitis - Vancomycin + Meropenem   Concurrent Antimicrobials:   o Meropenem - Day #30   Day of Vanc Therapy: #30   Current Dosing Method: AUC   Therapeutic Goal: 400-600 mg/mL*h   o Currently on 500mg IV q24h. Dose reduced 10/28 after random level showed apparent accumulation. o SCr stable with slight improvement to 1.3; however, Scr fluctuates. o Trough level today = 13 mcg/mL; regimen estimates an AUC = 324 and trough 10.5. This indicates that the regimen can be increased; however, as she accumulated on a higher dose, will continue this same regimen for now.    o Plan to check a repeat level in several days, or sooner if clinically indicated.  Will continue to monitor clinical condition and make adjustments to regimen as appropriate. Please call with questions--  Thanks--  Cheri Lee PharmD., [facility]   10/31/2021 7:54 AM  Wireless: [phone]        Interval update:  Patient still on 3L; refused CPAP overnight. Diuresing with oral furosemide. Scr improved slightly. Subjective/Objective:  Baltazar Dallas is a 60 y.o. female with a PMHx significant for obesity, RASHMI, HFrEF, bilateral foot wounds s/p partial amputation, CKD, chronic pain, DM who was recently admitted 10/1-10/16 for bilateral foot wounds, found to have osteomyelitis requiring I&D. Wound cultures grew Pseudomonas, E. Coli and Enterococcus. Pt was discharged on vancomycin and meropenem with recommendations from ID to continue through 11/26. Patient was then admitted 10/18-10/20 with AMS thought to be 2/2 hypoglycemia. Insulin was adjusted, and pt was discharged back to SNF. Pt now represents with AMS thought to be 2/2 hypoglycemia and possible accumulation of Methadone in setting of CKD 4.   Also with RUQ pain this admission - imaging showed biliary sludge, but no plans for intervention as no signs of acute cholecystitis per surgery. Pharmacy has been consulted to dose vancomycin. Pertinent Antimicrobials:  Meropenem 1000 mg IV q12h (10/2-current)   Vancomycin - pharmacy to dose (10/2-current)    (10/15-10/28)   500mg IV q24h (10/29-current)    Of note, known to clinical pharmacy team from earlier two admissions in Oct 2021. Pt was eventually scheduled on vancomycin 750 mg IV q24h with plans to continue through 11/26/21. Regimen predicted AUC of 503 and trough 16.1 as of 10/19/21. Ht Readings from Last 1 Encounters:   10/28/21 5' (1.524 m)      Wt Readings from Last 1 Encounters:   10/30/21 200 lb 13.4 oz (91.1 kg)     Vancomycin Level(s) / Doses:  Date Time Dose Level / Type of Level Interpretation   10/19 0630 750 mg IV q24h Random = 12.3 mcg/mL Drawn ~23h after previous dose. Predicted AUC = 503 & steady state trough + 16.1.   10/24 0851 750mg IV q24h Random = 18.7 mcg/mL Drawn ~24h after previous dose. Predicted  & steady state trough = 17.8.   10/28 0657 750mg IV q24h Random = 20.4 mcg/mL Drawn ~19h after previous dose. Predicts  & steady state trough = 20.5.   10/31 0530 500mg IV q24h Trough = 13 mcg/mL Drawn ~22 hrs after previous dose. Predicuts  & steady state trough = 10.5   Note: Serum levels collected for AUC-based dosing may be high if collected in close proximity to the dose administered. This is not necessarily an indicator of toxicity. Recent Labs     10/29/21  0516 10/30/21  0700 10/31/21  0501   CREATININE 1.8* 1.7* 1.5*   BUN 37* 35* 33*   WBC 6.9 6.5 8.3       Estimated Creatinine Clearance: 41 mL/min (A) (based on SCr of 1.5 mg/dL (H)).     Cultures & Sensitivities:  Date Site Micro Susceptibility / Result   10/24 Blood x2 No growth to date    10/14 Rapid flu negative

## 2021-10-31 NOTE — PLAN OF CARE
Problem: Falls - Risk of:  Goal: Will remain free from falls  Description: Will remain free from falls  10/31/2021 1519 by Shelbie Delarosa RN  Note: She is a fall risk. Armband in place, and bed alarm on. Call light in reach. Problem: Skin Integrity:  Goal: Absence of new skin breakdown  Description: Absence of new skin breakdown  10/31/2021 1519 by Shelbie Delarosa RN  Note: Skin to right side of abdomen is red in fold, and groin. Miconazole powder applied. Dressings to BLE clean dry and intact. Wound vac intact to right foot. She is on a special mattress. She is able to turn on her own, but refuses to turn off of right side. Will monitor. Problem: Pain:  Goal: Control of acute pain  Description: Control of acute pain  10/31/2021 1519 by Shelbie Delarosa RN  Note: Patient receiving methadone daily. After dose she sleeps all morning and afternoon. She awakens easily but cannot remain awake. Dr. Dinora Merritt aware. Problem: Nutrition  Goal: Optimal nutrition therapy  10/31/2021 1519 by Shlebie Delarosa RN  Note: Appetite is poor. Refusing supplements. Problem: Gas Exchange - Impaired:  Goal: Levels of oxygenation will improve  Description: Levels of oxygenation will improve  10/31/2021 1519 by Shelbie Delarosa RN  Note: Oxygen remains at 3L per cannula. Crackles heard bilaterally. Lasix given IV. Pulse ox in the 90's on oxygen. She drops to the low 80's when it is off.

## 2021-10-31 NOTE — CARE COORDINATION
Able to afford home medications/ co-pay costs: Yes    ADLS:  Current PT AM-PAC Score: 8 /24  Current OT AM-PAC Score: 14 /24      DISCHARGE Disposition: Sushant Mckeon (SNF): [facility] Phone: [phone] Fax: [phone]    LOC at discharge: Skilled  [address] Completed: Yes    Notification completed in HENS/PAS?:  Not Applicable    IMM Completed:   Not Indicated    Transportation:  Transportation PLAN for discharge: EMS transportation   Mode of Transport: Ambulance stretcher - BLS  Reason for medical transport: Bed confined: Meets the following criteria 1) unable to get out of bed without assistance or ambulate, 2) unable to safely sit up in a wheelchair, 3) unable to maintain erect seating position in a chair for time needed for transport  Name of Transport Company: [employer]  Phone: [phone]  Time of Transport: 3:00    Transport form completed: Yes    Home Care:  [address] ordered at discharge: No  [address]: Not Applicable  Orders faxed: No    Durable Medical Equipment:  DME Provider: None  Equipment obtained during hospitalization:     Home Oxygen and Respiratory Equipment:  Oxygen needed at discharge?: Yes  [address]: Not Applicable  Portable tank available for discharge?: Not Indicated    Dialysis:  Dialysis patient: No    Dialysis Center:  Not Applicable      Additional CM Notes: Pt from [facility], Pt to DC to [facility], [address] [place] [address] helio/Mala [phone] [facility].     The Plan for Transition of Care is related to the following treatment goals of Anasarca [R60.1]  Encephalopathy acute [G93.40]  Altered mental status, unspecified altered mental status type [R41.82]  Acute hypoxemic respiratory failure ([address] [J96.01]    The Patient and/or patient representative Jodi Bobby and her family were provided with a choice of provider and agrees with the discharge plan Yes    Freedom of choice list was provided with basic dialogue that supports the patient's individualized plan of care/goals and shares the quality data associated with the providers.  Yes    Care Transitions patient: No    SAURABH Sanchez, [facility]  Case Management Department  Ph: [phone]  Fax: [phone]

## 2021-10-31 NOTE — PROGRESS NOTES
Progress Note    Admit Date: 10/23/2021  Day: 9  Diet: ADULT ORAL NUTRITION SUPPLEMENT; Lunch, Dinner, Breakfast; Clear Liquid Oral Supplement  ADULT DIET; Easy to Chew; 3 carb choices (45 gm/meal); Low Fat/Low Chol/High Fiber/2 gm Na; Low Sodium (2 gm); Low Potassium (Less than 3000 mg/day)    CC: AMS    Interval history: No acute events overnight. Patient is on 3L NC this AM. She reports feeling well this AM and would like to return to her facility. She is sitting up in bed eating breakfast. She denies CP, N/V. Reports improvement of her abdominal pain. She endorses continued but improved shortness of breath. HPI:   Robin Ward is a 61 y/o F w/ hx if RASHMI, COPD, diastolic HF, DVT (8477), bilateral lower foot wounds w/ partial amputations, osteomyelitis on vancomycin and meropenem, CKD, DM who presents w/ AMS. EMS was called by the patient's daughters due to concern about waxing and waning mental status. The patient was recently discharged on 10/16 during which she was diagnosed w/ osteomyelitis of feet and given would care, multiple I&D, and antibiotic therapy. She was then discharged to a skilled nursing facility. She was readmitted on 10/18 due to being found unresponsive after receiving 140 mg of methadone in the morning and she was also found to have Bg of 30 and was given glucagon at that time. Patient was started on D50 and IVF and podiatry performed an I&D w/ partial cuboid resection. The patient was discharged on 10/20 after mental status returned to A&OX4. Tonight, in the emergency department the patient was alert to name only. The patient was hypoxic on room air and was responsive to 4L nasal cannula. Per EMR the patient's baseline is 2-3 liters. The patient's blood glucose was found to be 65, K was 5.2 (no EKG changes), and Creatine was 1.9 (baseline 1.4-1.7). The patient was admitted to the medicine floor for further workup and care.      Medications:     Scheduled Meds:   furosemide  40 mg IntraVENous Once    sodium zirconium cyclosilicate  5 g Oral BID    furosemide  40 mg IntraVENous Once    [START ON 11/1/2021] furosemide  40 mg Oral BID    chlorothiazide (DIURIL) IVPB  250 mg IntraVENous Q12H    polyethylene glycol  17 g Oral Daily    senna  1 tablet Oral Nightly    vancomycin  500 mg IntraVENous Q24H    insulin glargine  7 Units SubCUTAneous Daily    metoprolol tartrate  25 mg Oral BID    sodium chloride flush  5-40 mL IntraVENous 2 times per day    amitriptyline  100 mg Oral Nightly    aspirin  81 mg Oral Daily    atorvastatin  20 mg Oral Nightly    apixaban  5 mg Oral BID    escitalopram  10 mg Oral Daily    miconazole   Topical BID    pantoprazole  40 mg Oral Daily    meropenem (MERREM) 1000 mg IVPB (mini-bag)  1,000 mg IntraVENous 2 times per day    insulin lispro  0-6 Units SubCUTAneous TID WC    insulin lispro  0-3 Units SubCUTAneous Nightly     Continuous Infusions:   sodium chloride 25 mL (10/31/21 0618)    dextrose       PRN Meds:sodium chloride, naloxone, sodium chloride flush, sodium chloride, ondansetron **OR** ondansetron, acetaminophen **OR** acetaminophen, glucose, dextrose, glucagon (rDNA), dextrose    Objective:   Vitals:   T-max:  Patient Vitals for the past 8 hrs:   BP Temp Temp src Pulse Resp SpO2   10/31/21 1143 (!) 160/71 98.2 °F (36.8 °C) Oral 64 16 92 %   10/31/21 0748 (!) 166/57 98.6 °F (37 °C) Axillary 71 14 93 %       Intake/Output Summary (Last 24 hours) at 10/31/2021 1408  Last data filed at 10/31/2021 1148  Gross per 24 hour   Intake 840 ml   Output 1850 ml   Net -1010 ml       Review of Systems   All other systems reviewed and are negative. Physical Exam  Constitutional:       Appearance: She is ill-appearing. HENT:      Head: Normocephalic and atraumatic. Eyes:      Extraocular Movements: Extraocular movements intact. Conjunctiva/sclera: Conjunctivae normal.   Cardiovascular:      Rate and Rhythm: Normal rate and regular rhythm. Pulmonary:      Effort: Pulmonary effort is normal.      Breath sounds: Rales present. Comments: improved  Abdominal:      General: Abdomen is flat. Palpations: Abdomen is soft. There is hepatomegaly. Tenderness: There is abdominal tenderness. Comments: RUQ pain, improved   Musculoskeletal:         General: Normal range of motion. Cervical back: Normal range of motion. Comments: BLEs wrapped in ace bandages with wound vac in place,   Skin:     General: Skin is warm and dry. Neurological:      General: No focal deficit present. Mental Status: She is alert. Psychiatric:         Mood and Affect: Mood normal.       LABS:    CBC:   Recent Labs     10/29/21  0516 10/30/21  0700 10/31/21  0501   WBC 6.9 6.5 8.3   HGB 8.6* 8.4* 9.3*   HCT 26.1* 25.3* 27.5*    399 426   MCV 87.2 86.6 84.7     Renal:    Recent Labs     10/29/21  0516 10/30/21  0700 10/31/21  0501    137 137   K 4.8 4.8 5.3*    100 98*   CO2 32 33* 31   BUN 37* 35* 33*   CREATININE 1.8* 1.7* 1.5*   GLUCOSE 183* 133* 177*   CALCIUM 8.3 8.3 8.9   MG 2.00 1.70* 1.80   PHOS 4.2 3.8 3.5   ANIONGAP 5 4 8     Hepatic:   Recent Labs     10/30/21  0700 10/31/21  0501   LABALBU 2.0* 2.2*     Troponin:   No results for input(s): TROPONINI in the last 72 hours. BNP: No results for input(s): BNP in the last 72 hours. Lipids: No results for input(s): CHOL, HDL in the last 72 hours. Invalid input(s): LDLCALCU, TRIGLYCERIDE  ABGs:    No results for input(s): PHART, ZAO7WMX, PO2ART, KVI3PZX, BEART, THGBART, Z0WLAWRT, OQK1RYV in the last 72 hours. INR:   No results for input(s): INR in the last 72 hours. Lactate: No results for input(s): LACTATE in the last 72 hours. Cultures:  -----------------------------------------------------------------  RAD:   XR CHEST PORTABLE   Final Result      Coarse multifocal consolidation with significant progression since 10/23/2021. Correlate for viral pneumonia.       Stable cardiomediastinal silhouette. Right jugular central venous catheter without change. US ABDOMEN LIMITED   Final Result   IMPRESSION :      Gallbladder sludge without evidence of acute cholecystitis. Mildly coarsened hepatic echotexture. Correlate with liver function tests. FL MODIFIED BARIUM SWALLOW W VIDEO   Final Result      1. Weak swallowing reflex with delayed AP transit of the bolus. 2.  No aspiration penetration with the various consistencies attempted. Please see speech pathology report for additional findings and recommendations. CT CHEST WO CONTRAST   Final Result   1. No acute intracranial findings. CHEST:         FINDINGS:      Patchy bilateral groundglass opacity and airspace disease in both upper lobes and to a lesser extent in both lower lobes consistent with pneumonia. Small right pleural effusion. No significant left pleural effusion. Normal heart size. No pericardial    effusion. Vascular aorta and main pulmonary artery are normal in caliber. Right IJ venous catheter tip at the cavoatrial junction. There is diffuse anasarca. Small gallstones are noted in the gallbladder. There is a calcification in the tail the    pancreas measuring 1.1 cm which is stable since previous abdominal CT from June 2021. There is a cystic area in the tail the pancreas measuring 2.4 x 1.2 cm which is also unchanged. On previous chest CT from 2011, the cystic area in the tail the pancreas    measured 2.8 x 1.7 cm. No fractures are identified. IMPRESSION:   1. Moderate patchy bilateral airspace disease consistent with multifocal pneumonia including potential atypical viral etiologies. Clinical correlation recommended. 2. Trace right pleural effusion. 3. Anasarca. CT Head WO Contrast   Final Result   1. No acute intracranial findings.       CHEST:         FINDINGS:      Patchy bilateral groundglass opacity and airspace disease in both upper lobes and to a lesser extent in both lower lobes consistent with pneumonia. Small right pleural effusion. No significant left pleural effusion. Normal heart size. No pericardial    effusion. Vascular aorta and main pulmonary artery are normal in caliber. Right IJ venous catheter tip at the cavoatrial junction. There is diffuse anasarca. Small gallstones are noted in the gallbladder. There is a calcification in the tail the    pancreas measuring 1.1 cm which is stable since previous abdominal CT from June 2021. There is a cystic area in the tail the pancreas measuring 2.4 x 1.2 cm which is also unchanged. On previous chest CT from 2011, the cystic area in the tail the pancreas    measured 2.8 x 1.7 cm. No fractures are identified. IMPRESSION:   1. Moderate patchy bilateral airspace disease consistent with multifocal pneumonia including potential atypical viral etiologies. Clinical correlation recommended. 2. Trace right pleural effusion. 3. Anasarca. XR CHEST PORTABLE   Final Result   1. Stable diffuse bilateral airspace disease. Assessment/Plan:    Len Wylie is a 59 y/o F w/ hx if RASHMI, COPD, diastolic CHF (EF 22%), DVT (2004), bilateral lower foot wounds w/ partial amputations, osteomyelitis on vancomycin and meropenem, CKD, DM who presents w/ AMS. EMS was called by the patient's daughters due to concern about waxing and waning mental status.     Acute metabolic encephalopathy suspected 2/2 methadone vs hypoglycemia--resolved  On arrival to ED patient was only alert to her name. On the floor patient was alert and oriented X4 but was somnolent. Arousalable to verbal stimuli. CKD3 may cause decreased renal clearance of methadone  -continue on Lantus 7U today  -LDSS  -Follow glucose 4x daily  -methadone at dec dose of 80mg     Patchy Bilateral airspace disease 2/2 volume overload   Patient has hx of COPD (30 pack year smoking hx). Baseline oxygen 2-3L.  Echo 10/21 showed PASP of 60mmHg, EF ~55%, and mild LVH. RR ~7 BPM + required 4L NC on presentation. Unlikely bacterial due to continuous treatment w/ Vanc + Merem. CXR: Coarse multifocal consolidation with significant progression since 10/23/2021.  - COVID negative  - continue IV lasix 40mg BID  - strict I's and O's  - on 3L O2  - nephrology consulted, appreciate recs  - ProBNP 1781->6936 today    Hyperkalemia  - Na zirconium x 1 dose  - Low potassium diet  - repeat BMP heidi AM    RUQ Pain - improved   Hepatomegaly, gallstones on CT. Hepatic function panel: elevated alk phos  -RUQ U/S of gallbladder and liver: gallbladder sludge w/o evidence of acute cholecystitis, mildly coarsened hepatic echo texture  -surgery following, no interventions at this time. FAHEEM - resolved  CKD 3 - at baseline  Baseline Cr 1.4-1.7  -avoid nephrotoxic agents, NSAIDs. Daily weights. Maintain SBP >90.  -nephrology following, appreciate recs  -transition today to lasix PO 40 bid    Osteomyelitis  Cult polymicrobial with Ps aerug (R cipro), E coli, E faecalis.  Seen by ID previously, IV abx ordered (vanc, merrem) through 11/26.   -Cont Vanc and Meropenem  -wound vac  -podiatry following     Elevated Troponin--resolved  -0.05 -> 0.04  -Could be secondary to dehydration, CKD    Chronic problems:  DM2   - continue half dose of lantus, LDSS, hypoglycemia protocol, glucose checks QID    Anemia of chronic disease  - stable    Moderate PAH  - Echo 10/2021 showed PASP of 60mmHg    Hx of DVT   - continue eliquis    Anxiety/Depression  -continue home amitryptiline (consider reducing dose), escitalopram    Code Status:FULL  FEN: Adult regular, 3 carb  PPX: eliquis  DISPO: Oly Joe MD, PGY-2  10/31/21  2:08 PM    This patient will be discussed with Triston Curtis MD.

## 2021-11-01 NOTE — PROGRESS NOTES
Surgery Daily Progress Note      CC: RUQ abdominal pain    SUBJECTIVE:  Resting comfortably this morning. Denies any abdominal pain. Tolerating a diet. ROS:   A 14 point review of systems was conducted, significant findings as noted above. All other systems negative. OBJECTIVE:    PHYSICAL EXAM:  Vitals:    10/31/21 2039 10/31/21 2323 10/31/21 2324 11/01/21 0305   BP: 137/68 (!) 142/63  (!) 150/64   Pulse: 66 65  61   Resp: 16 14 14 14   Temp: 98.5 °F (36.9 °C) 98.2 °F (36.8 °C)  98 °F (36.7 °C)   TempSrc: Oral Oral  Axillary   SpO2: 98% 95%  98%   Weight:       Height:           General appearance: drowsy; Chest/Lungs: normal respiratory effort,   Cardiovascular: RRR  Abdomen: morbidly obese, soft, non-tender, non-distended, no guarding/rigidity  Skin: warm and dry  Extremities: B/L lower extremities with ACE bandage wrapping  Neuro: arousable, no focal deficits      ASSESSMENT & PLAN:   Seth Melendez is a 60 y.o. female with history of COPD, RASHMI, diastolic CHF (EF 32%), previous DVTs (on Eliquis), CKD, and DM who is admitted to [facility] for altered mental status. Our service has been consulted due to concern for RUQ abdominal pain. - No indication for surgical intervention this admission, as patient does not have acute cholecystitis.  - Okay to continue diet. - Further management per medical team.  - At discharge, okay to follow up with general surgery for possible elective cholecystectomy once she recovers from her acute illness. General surgery will continue to follow while in house.     Khang Gallo DO  PGY1, General Surgery  11/01/21  6:12 AM  [phone]

## 2021-11-01 NOTE — PROGRESS NOTES
Podiatric Surgery Daily Progress Note      Admit Date: 10/23/2021                           Code:Full Code    Patient seen and examined, labs and records reviewed    Subjective:     Patient seen and examined at bedside this AM. Patient denies f/c/n/v/sob/cp. Patient was sitting up comfortably in her bed. Reports she is hopeful to be discharged from the hospital today. Patient has no other pedal complaints during today's examination. Objective     BP (!) 177/76   Pulse 73   Temp 97.9 °F (36.6 °C) (Oral)   Resp 16   Ht 5' (1.524 m)   Wt 200 lb 13.4 oz (91.1 kg)   LMP 10/23/2015   SpO2 91%   BMI 39.22 kg/m²      I/O:    Intake/Output Summary (Last 24 hours) at 11/1/2021 0829  Last data filed at 11/1/2021 0308  Gross per 24 hour   Intake 1090 ml   Output 1450 ml   Net -360 ml              Wt Readings from Last 3 Encounters:   10/30/21 200 lb 13.4 oz (91.1 kg)   10/20/21 223 lb 1.7 oz (101.2 kg)   10/15/21 228 lb 2.8 oz (103.5 kg)       LABS:    Recent Labs     10/31/21  0501 11/01/21  0535   WBC 8.3 7.7   HGB 9.3* 9.5*   HCT 27.5* 28.6*    395        Recent Labs     11/01/21  0535      K 5.1   CL 98*   CO2 29   PHOS 4.5   BUN 36*   CREATININE 1.6*        No results for input(s): PROT, INR, APTT in the last 72 hours. LOWER EXTREMITY EXAMINATION    Dressing to B/L LE intact. No strikethrough drainage noted to the external dressing. No strikethrough drainage noted to the internal dressing layer B/L LE. Wound VAC running optimally at 150 mmHg    VASCULAR:   - DP and PT pulses are nonpalpable  b/l. - Upon hand-held Doppler biphasic signals noted to DP and PT pulses B/L LE.  - CFT brisk to the dorsal and plantar TMA stump site right foot. - CFT brisk to to the remaining distal digits of the foot left foot.    - Skin temperature is warm to cool from proximal to distal with no focal calor noted and equal skin temperature as compared to the contralateral limb.  - Nonpitting edema noted b/l with slight improvement 2/2 Ace compressive therapy. - No pain with calf compression b/l.     NEUROLOGIC:   - Gross and epicritic sensation is diminished b/l.   - Protective sensation is diminished at all pedal sites b/l.     DERMATOLOGIC:   Right lower extremity:  #1  Lateral aspect 5 cm linear well approximated skin incision with intact sutures.  -No dehiscence or drainage noted. -Stable and without periwound erythema.     #2 Full-thickness ulceration cuboid region plantar aspect.  -Wound measures approximately 4.0 x 3.9 x 3.0 cm.  -Wound base granular with macerated periwound.  -Wound does not probe to bone, tunnels, or tracks.  -No fluctuance, crepitus, malodor, or drainage noted. -Clinically stable and improving 2/2 wound VAC therapy     Patient provided verbal consent for today's photos. Left lower extremity:  #1 lateral aspect left foot  -10 cm linear surgical incision with well coapted skin edges.  -Diffuse xerotic skin with no dehiscence or acute signs infection. #2 Full-thickness plantar aspect fourth metatarsal head  -Wound measures approximately 2.0 x 1.1 x 0.1 cm  -Wound base necrotic and fibrotic in nature with epithelialized borders.  -Wound does not probe to bone, tunnels, or tracks.  -No fluctuance, crepitus, malodor, or drainage noted. -Stable and has been since admission                    MUSCULOSKELETAL:   - Muscle strength is 4/5 for all pedal groups tested. - No pain with palpation of the foot or ankle b/l. - Ankle joint ROM is decreased in dorsiflexion with the knee extended.    - Hx of hallux amputation and fifth ray resection left foot  - Hx of transmetatarsal amputation right foot      ASSESSMENT/PLAN  -Full-thickness ulceration Carpenter 2; plantar cuboid right foot  -Full-thickness ulceration Carpenter 1; subfourth metatarsal left foot  -S/P I&D, partial cuboid resection of right foot, I&D with [facility] of left foot (10/13/21)  -PVD; b/l LE  -Lymphedema; b/l LE  -Type 2 diabetes mellitus with peripheral neuropathy  -History of osteomyelitis; b/l LE  -History of noncompliance with weight bearing status    -Patient seen and examined at bedside this a.m.  -Hypertensive, otherwise VSS on 3L NC. No leukocytosis noted   -No imaging obtained 2/2 low concern of foot infection and well known chronic osteomyelitis from prior imaging.  -Wound culture not obtained at this time 2/2 no active drainage and would only colonized skin sukhjinder.  -Patient provided verbal consent for today's excisional debridement. Utilizing #10 blade, excisional debridement down to and including subcutaneous tissue of left foot wound to healthy bleeding tissue margins. Less than 3 cc bleeding noted. Hemostasis achieved with direct pressure. Patient tolerated procedure well. -Infectious disease following, continue IV vancomycin and meropenem. -Dressing applied right lower extremity consisting of Adaptic, wet-to-dry, gauze, Kerlix, Ace bandage  -Left lower extremity dressed with Adaptic, Betadine, DSD, and Ace bandage.  -Keep B/L LE dressings clean, dry, and intact and will reevaluate Wednesday if still in house for next dressing change.  -Nonweightbearing to bilateral LE. Can pivot as needed to transfer to bathroom or bed. DISPO: Full-thickness ulcerations subcuboid right foot and plantar aspects of fourth metatarsal clinically stable and without acute signs infection. Please continue IV vancomycin and meropenem. No surgical intervention and okay for discharge from podiatric standpoint pending medical clearance. Patient can follow-up with Dr. Jamil Oliveira at the podiatry clinic 1 week after being discharged from the hospital.    Discussed assessment and plan with Dr. Jamil Oliveira.     Fuad Peters DPM  11/01/21  8:29 AM

## 2021-11-01 NOTE — PROGRESS NOTES
Progress Note    Admit Date: 10/23/2021  Day: 10  Diet: ADULT ORAL NUTRITION SUPPLEMENT; Lunch, Dinner, Breakfast; Clear Liquid Oral Supplement  ADULT DIET; Easy to Chew; 3 carb choices (45 gm/meal); Low Fat/Low Chol/High Fiber/2 gm Na; Low Sodium (2 gm); Low Potassium (Less than 3000 mg/day)    CC: AMS    Interval history: No acute events overnight. Patient is on 3L NC this AM. She reports feeling not like her normal self. She wants to be back on her methadone and gabapentin. She denies CP, N/V. Reports improvement of her abdominal pain. She reports continued but improved shortness of breath. HPI:   Elliot Sandoval is a 59 y/o F w/ hx if RASHMI, COPD, diastolic HF, DVT (2399), bilateral lower foot wounds w/ partial amputations, osteomyelitis on vancomycin and meropenem, CKD, DM who presents w/ AMS. EMS was called by the patient's daughters due to concern about waxing and waning mental status. The patient was recently discharged on 10/16 during which she was diagnosed w/ osteomyelitis of feet and given would care, multiple I&D, and antibiotic therapy. She was then discharged to a skilled nursing facility. She was readmitted on 10/18 due to being found unresponsive after receiving 140 mg of methadone in the morning and she was also found to have Bg of 30 and was given glucagon at that time. Patient was started on D50 and IVF and podiatry performed an I&D w/ partial cuboid resection. The patient was discharged on 10/20 after mental status returned to A&OX4. Tonight, in the emergency department the patient was alert to name only. The patient was hypoxic on room air and was responsive to 4L nasal cannula. Per EMR the patient's baseline is 2-3 liters. The patient's blood glucose was found to be 65, K was 5.2 (no EKG changes), and Creatine was 1.9 (baseline 1.4-1.7). The patient was admitted to the medicine floor for further workup and care.      Medications:     Scheduled Meds:   furosemide  40 mg Oral BID    polyethylene glycol  17 g Oral Daily    senna  1 tablet Oral Nightly    vancomycin  500 mg IntraVENous Q24H    insulin glargine  7 Units SubCUTAneous Daily    metoprolol tartrate  25 mg Oral BID    sodium chloride flush  5-40 mL IntraVENous 2 times per day    amitriptyline  100 mg Oral Nightly    aspirin  81 mg Oral Daily    atorvastatin  20 mg Oral Nightly    apixaban  5 mg Oral BID    escitalopram  10 mg Oral Daily    miconazole   Topical BID    pantoprazole  40 mg Oral Daily    meropenem (MERREM) 1000 mg IVPB (mini-bag)  1,000 mg IntraVENous 2 times per day    insulin lispro  0-6 Units SubCUTAneous TID WC    insulin lispro  0-3 Units SubCUTAneous Nightly     Continuous Infusions:   sodium chloride 25 mL (10/31/21 0618)    dextrose       PRN Meds:sodium chloride, naloxone, sodium chloride flush, sodium chloride, ondansetron **OR** ondansetron, acetaminophen **OR** acetaminophen, glucose, dextrose, glucagon (rDNA), dextrose    Objective:   Vitals:   T-max:  Patient Vitals for the past 8 hrs:   BP Temp Temp src Pulse Resp SpO2   11/01/21 0730 (!) 177/76 97.9 °F (36.6 °C) Oral 73 16 91 %   11/01/21 0727 (!) 177/76 97.9 °F (36.6 °C) Oral 73 16 91 %   11/01/21 0305 (!) 150/64 98 °F (36.7 °C) Axillary 61 14 98 %       Intake/Output Summary (Last 24 hours) at 11/1/2021 0823  Last data filed at 11/1/2021 0308  Gross per 24 hour   Intake 1090 ml   Output 1450 ml   Net -360 ml       Review of Systems   All other systems reviewed and are negative. Physical Exam  Constitutional:       Appearance: She is ill-appearing. HENT:      Head: Normocephalic and atraumatic. Eyes:      Extraocular Movements: Extraocular movements intact. Conjunctiva/sclera: Conjunctivae normal.   Cardiovascular:      Rate and Rhythm: Normal rate and regular rhythm. Pulmonary:      Effort: Pulmonary effort is normal.      Breath sounds: Rales present. Comments: improved  Abdominal:      General: Abdomen is flat.       Palpations: sludge without evidence of acute cholecystitis. Mildly coarsened hepatic echotexture. Correlate with liver function tests. FL MODIFIED BARIUM SWALLOW W VIDEO   Final Result      1. Weak swallowing reflex with delayed AP transit of the bolus. 2.  No aspiration penetration with the various consistencies attempted. Please see speech pathology report for additional findings and recommendations. CT CHEST WO CONTRAST   Final Result   1. No acute intracranial findings. CHEST:         FINDINGS:      Patchy bilateral groundglass opacity and airspace disease in both upper lobes and to a lesser extent in both lower lobes consistent with pneumonia. Small right pleural effusion. No significant left pleural effusion. Normal heart size. No pericardial    effusion. Vascular aorta and main pulmonary artery are normal in caliber. Right IJ venous catheter tip at the cavoatrial junction. There is diffuse anasarca. Small gallstones are noted in the gallbladder. There is a calcification in the tail the    pancreas measuring 1.1 cm which is stable since previous abdominal CT from June 2021. There is a cystic area in the tail the pancreas measuring 2.4 x 1.2 cm which is also unchanged. On previous chest CT from 2011, the cystic area in the tail the pancreas    measured 2.8 x 1.7 cm. No fractures are identified. IMPRESSION:   1. Moderate patchy bilateral airspace disease consistent with multifocal pneumonia including potential atypical viral etiologies. Clinical correlation recommended. 2. Trace right pleural effusion. 3. Anasarca. CT Head WO Contrast   Final Result   1. No acute intracranial findings. CHEST:         FINDINGS:      Patchy bilateral groundglass opacity and airspace disease in both upper lobes and to a lesser extent in both lower lobes consistent with pneumonia. Small right pleural effusion. No significant left pleural effusion. Normal heart size.  No pericardial effusion. Vascular aorta and main pulmonary artery are normal in caliber. Right IJ venous catheter tip at the cavoatrial junction. There is diffuse anasarca. Small gallstones are noted in the gallbladder. There is a calcification in the tail the    pancreas measuring 1.1 cm which is stable since previous abdominal CT from June 2021. There is a cystic area in the tail the pancreas measuring 2.4 x 1.2 cm which is also unchanged. On previous chest CT from 2011, the cystic area in the tail the pancreas    measured 2.8 x 1.7 cm. No fractures are identified. IMPRESSION:   1. Moderate patchy bilateral airspace disease consistent with multifocal pneumonia including potential atypical viral etiologies. Clinical correlation recommended. 2. Trace right pleural effusion. 3. Anasarca. XR CHEST PORTABLE   Final Result   1. Stable diffuse bilateral airspace disease. Assessment/Plan:    Michel Mon is a 61 y/o F w/ hx if RASHMI, COPD, diastolic CHF (EF 44%), DVT (2004), bilateral lower foot wounds w/ partial amputations, osteomyelitis on vancomycin and meropenem, CKD, DM who presents w/ AMS. EMS was called by the patient's daughters due to concern about waxing and waning mental status.     Acute metabolic encephalopathy suspected 2/2 methadone vs hypoglycemia--resolved  On arrival to ED patient was only alert to her name. On the floor patient was alert and oriented X4 but was somnolent. Arousalable to verbal stimuli. CKD3 may cause decreased renal clearance of methadone  -continue on Lantus 7U today  -LDSS  -Follow glucose 4x daily  -d/c methadone      Patchy Bilateral airspace disease 2/2 volume overload   Patient has hx of COPD (30 pack year smoking hx). Baseline oxygen 2-3L. Echo 10/21 showed PASP of 60mmHg, EF ~55%, and mild LVH. RR ~7 BPM + required 4L NC on presentation. Unlikely bacterial due to continuous treatment w/ Vanc + Merem.  CXR: Coarse multifocal consolidation with significant progression since 10/23/2021.  - COVID negative  - IV lasix 40mg BID switched to torsemide 40 mg daily  - strict I's and O's  - on 3L O2  - nephrology consulted, appreciate recs  - ProBNP 6537->2128    Hyperkalemia--resolved  - Na zirconium x 1 dose  - Low potassium diet    RUQ Pain - improved   Hepatomegaly, gallstones on CT. Hepatic function panel: elevated alk phos  -RUQ U/S of gallbladder and liver: gallbladder sludge w/o evidence of acute cholecystitis, mildly coarsened hepatic echo texture  -surgery following, no interventions at this time. FAHEEM - resolved  CKD 3 - at baseline  Baseline Cr 1.4-1.7  -avoid nephrotoxic agents, NSAIDs. Daily weights. Maintain SBP >90.  -nephrology following, appreciate recs  -transition to 40 mg torsemide daily    Osteomyelitis  Cult polymicrobial with Ps aerug (R cipro), E coli, E faecalis.  Seen by ID previously, IV abx ordered (vanc, merrem) through 11/26.   -Cont Vanc and Meropenem  -wound vac  -podiatry following     Elevated Troponin--resolved  -0.05 -> 0.04  -Could be secondary to dehydration, CKD    Chronic problems:  DM2   - continue half dose of lantus, LDSS, hypoglycemia protocol, glucose checks QID    Anemia of chronic disease  - stable    Moderate PAH  - Echo 10/2021 showed PASP of 60mmHg    Hx of DVT   - continue eliquis    Anxiety/Depression  -continue home amitryptiline (consider reducing dose), escitalopram    Code Status:FULL  FEN: Adult regular, 3 carb  PPX: eliquis  DISPO: IP--d/c today    Naresh Flower MD, PGY-1  11/01/21  8:23 AM    This patient will be discussed with Katelynn Hanna MD.

## 2021-11-01 NOTE — PROGRESS NOTES
Physical Therapy  No treatment    Pt in bed. Working on lunch. Declining PT this afternoon. States she hopes to be D/[place] later today. Wants to finish eating. Also states she needs to go to the bathroom. \"And I can't have anyone in here while I do that. \" Offered to help pt get on bedpan, but she declined. \"I have a diaper on. I'll call for my nurse when I'm done. \"   RN informed. Tentative D/C to SNF later today. Will continue per plan of care if not D/Kendall.      Riya, [place] #[id number]

## 2021-11-01 NOTE — DISCHARGE SUMMARY
INTERNAL MEDICINE    RESIDENT DISCHARGE SUMMARY   Discharge Summaries      Patient ID: Yoli Avilez   Gender: female      :  1965  AGE: 60 y.o. MRN:  [id number]  Code Status: Full Code   PCP: Andrew Chaparro MD    Admit date:  10/23/2021      Discharge date:  2021  5:25 PM    Admitting Physician:  Lee Elizalde DO    Discharge Physician: Kaylene Murrell MD     Admission Condition:  poor  Discharged Condition:  stable Stable    Discharge Diagnoses: Active Hospital Problems    Diagnosis Date Noted    Biliary sludge [K83.8]     Acute hypoxemic respiratory failure ([facility] [address] [J96.01]     Encephalopathy acute [G93.40] 10/24/2021       The patient was seen and examined on day of discharge and this discharge summary is in conjunction with any daily progress note from day of discharge. Hospital Course:       [facility] 59 [facility] w/ hx if RASHMI, COPD, systolic HF, DVT (8241), bilateral lower foot wounds w/ partial amputations, osteomyelitis on vancomycin and meropenem, CKD, DM who presents w/ AMS. EMS was called by the patient's daughters due to concern about waxing and waning mental status. The patient was recently discharged on 10/16 during which she was diagnosed w/ osteomyelitis of the feet and given wound care, multiple I&D, and antibiotic therapy. She was then discharged to a skilled nursing facility. She was readmitted on 10/18 due to being found unresponsive after receiving 140 mg of methadone in the morning and she was also found to have Bg of 30 and was given glucagon at that time. Patient was started on D50 and IVF and podiatry performed an I&D w/ partial cuboid resection. The patient was discharged on 10/20 after mental status returned to A&OX4. In the ED,  the patient was alert to name only. The patient was hypoxic on room air and was responsive to 4L nasal cannula. Per EMR the patient's baseline is 2-3 liters.  The patient's blood glucose was found to be 65, K was 5.2 (no EKG changes), and Creatine was 1.9 (baseline 1.4-1.7). The patient was admitted to the medicine floor for further workup and care. Methadone was discontinued and the patient was given half dose of Lantus and started on COWS protocol. Her mental status improved. Methadone was restarted at half dose. The patient was adamant about having her methadone at full dose or she would not take any. A compromise was made to restart it at 120mg. Pt's mental status declined and respiratory rate declined. She was given narcan and responded appropriately. Methadone was continued at a lower dose of 80mg. The day before discharge the patient was seemed to be more somnolent. Methadone was discontinued entirely. She should follow-up at the methadone clinic. Pt was hypoxic on arrival needing up to 5L NC which was up from her baseline. CT Chest wo contrast showed moderate pathcy bilateral airspace disease, with multifocal pneumonia, trace right pleural effusion and ansarca. A COVID test was done that came back negative. Pt had diffuse crackles on exam bilaterally. IV lasix were given and pt diuresed >5L since admission. Nephrology was also consulted, to help in management of her diuretics and FAHEEM on CKD. Cr is back to baseline. Pt was also put on BIPAP nightly, ECHO of 10/2021 with elevated PA pressure. On the day of discharge pt weaned down to 3L. She will be discharged on torsemide daily. For osteomyelitis of the foot the pt was continued on Vancomycin and meropenem. Podiatry was following for wound care and management. Pt complaining of this new RUQ pain, and CT showed gallstones on exam. RUQ U/S done that showed gallbladder sludge w/o evidence of acute cholecystitis. Surgery was consulted. No surgical interventions were deemed necessary at this time. Pt may follow outpatient for evaluation of elective lap maggie. RUQ pain slowly improved.     Disposition:  [facility]    Physical Exam Performed:     /64 IMPRESSION :      Gallbladder sludge without evidence of acute cholecystitis. Mildly coarsened hepatic echotexture. Correlate with liver function tests. FL MODIFIED BARIUM SWALLOW W VIDEO   Final Result      1. Weak swallowing reflex with delayed AP transit of the bolus. 2.  No aspiration penetration with the various consistencies attempted. Please see speech pathology report for additional findings and recommendations. CT CHEST WO CONTRAST   Final Result   1. No acute intracranial findings. CHEST:         FINDINGS:      Patchy bilateral groundglass opacity and airspace disease in both upper lobes and to a lesser extent in both lower lobes consistent with pneumonia. Small right pleural effusion. No significant left pleural effusion. Normal heart size. No pericardial    effusion. Vascular aorta and main pulmonary artery are normal in caliber. Right IJ venous catheter tip at the cavoatrial junction. There is diffuse anasarca. Small gallstones are noted in the gallbladder. There is a calcification in the tail the    pancreas measuring 1.1 cm which is stable since previous abdominal CT from June 2021. There is a cystic area in the tail the pancreas measuring 2.4 x 1.2 cm which is also unchanged. On previous chest CT from 2011, the cystic area in the tail the pancreas    measured 2.8 x 1.7 cm. No fractures are identified. IMPRESSION:   1. Moderate patchy bilateral airspace disease consistent with multifocal pneumonia including potential atypical viral etiologies. Clinical correlation recommended. 2. Trace right pleural effusion. 3. Anasarca. CT Head WO Contrast   Final Result   1. No acute intracranial findings. CHEST:         FINDINGS:      Patchy bilateral groundglass opacity and airspace disease in both upper lobes and to a lesser extent in both lower lobes consistent with pneumonia. Small right pleural effusion. No significant left pleural effusion.  Normal heart size. No pericardial    effusion. Vascular aorta and main pulmonary artery are normal in caliber. Right IJ venous catheter tip at the cavoatrial junction. There is diffuse anasarca. Small gallstones are noted in the gallbladder. There is a calcification in the tail the    pancreas measuring 1.1 cm which is stable since previous abdominal CT from June 2021. There is a cystic area in the tail the pancreas measuring 2.4 x 1.2 cm which is also unchanged. On previous chest CT from 2011, the cystic area in the tail the pancreas    measured 2.8 x 1.7 cm. No fractures are identified. IMPRESSION:   1. Moderate patchy bilateral airspace disease consistent with multifocal pneumonia including potential atypical viral etiologies. Clinical correlation recommended. 2. Trace right pleural effusion. 3. Anasarca. XR CHEST PORTABLE   Final Result   1. Stable diffuse bilateral airspace disease. Consults:     IP CONSULT TO HOSPITALIST  PHARMACY TO DOSE VANCOMYCIN  IP CONSULT TO CASE MANAGEMENT  IP CONSULT TO PODIATRY  IP CONSULT TO GENERAL SURGERY  IP CONSULT TO DIETITIAN  IP CONSULT TO NEPHROLOGY      Discharge Instructions/Follow-up:  Follow-up PCP in one week, follow-up with methadone clinic    Activity: activity as tolerated    Diet: low sodium    Discharge Medications:     Current Discharge Medication List           Details   torsemide (DEMADEX) 20 MG tablet Take 2 tablet by mouth daily  Qty: 30 tablet, Refills: 0      oxyCODONE (ROXICODONE) 5 MG immediate release tablet Take 1 tablet by mouth every 8 hours as needed for Pain for up to 2 days. WARNING:  May cause drowsiness. May impair ability to operate vehicles or machinery. Do not use in combination with alcohol.   Qty: 6 tablet, Refills: 0    Comments: Reduce doses taken as pain becomes manageable  Associated Diagnoses: Anasarca; Opioid dependence in remission ([facility] [address] S/P foot surgery, right              Details   gabapentin (NEURONTIN) 100 MG capsule Take 2 capsules by mouth 2 times daily for 30 days. Qty: 120 capsule, Refills: 0      insulin glargine (LANTUS SOLOSTAR) 100 UNIT/ML injection pen Inject 7 Units into the skin nightly  Qty: 5 pen, Refills: 0              Details   glucagon 1 MG injection Inject 1 kit into the muscle as needed (low blood sugar)      aspirin 81 MG EC tablet Take 81 mg by mouth daily      omeprazole (PRILOSEC) 20 MG delayed release capsule Take 20 mg by mouth daily      ammonium lactate (LAC-HYDRIN) 12 % lotion Apply topically daily Apply to both legs daily      insulin aspart (NOVOLOG FLEXPEN) 100 UNIT/ML injection pen Inject 8 Units into the skin 3 times daily (before meals)      vancomycin (VANCOCIN) 750 MG injection Infuse 750 mg intravenously daily      meropenem (MERREM) 1 g injection Inject 1,000 mg into the muscle every 12 hours      hydrALAZINE (APRESOLINE) 50 MG tablet Take 1 tablet by mouth every 8 hours  Qty: 90 tablet, Refills: 3      metoprolol succinate (TOPROL XL) 50 MG extended release tablet Take 1 tablet by mouth daily  Qty: 30 tablet, Refills: 3      ELIQUIS 5 MG TABS tablet TAKE 1 TABLET BY MOUTH TWICE DAILY  Qty: 180 tablet, Refills: 1    Associated Diagnoses: Deep vein thrombosis (DVT) of proximal lower extremity, unspecified chronicity, unspecified laterality (HCC)      escitalopram (LEXAPRO) 10 MG tablet Take 1 tablet by mouth daily  Qty: 30 tablet, Refills: 2    Associated Diagnoses: Depression, unspecified depression type      amitriptyline (ELAVIL) 100 MG tablet TAKE 1 TABLET BY MOUTH EVERY NIGHT AT BEDTIME  Qty: 30 tablet, Refills: 2    Associated Diagnoses: Type 2 diabetes mellitus with diabetic polyneuropathy, with long-term current use of insulin ([facility] [address]  Diabetic polyneuropathy associated with type 2 diabetes mellitus (HCC)      atorvastatin (LIPITOR) 20 MG tablet Take 1 tablet by mouth nightly  Qty: 90 tablet, Refills: 1    Associated Diagnoses: Dyslipidemia      nystatin (MYCOSTATIN) 067871 UNIT/GM powder Apply topically 2 times daily Apply to right breast and groin area BID  Qty: 30 g, Refills: 3    Associated Diagnoses: Diabetic foot infection ([address]      blood glucose test strips (TRUE METRIX BLOOD GLUCOSE TEST) strip 1 each by In Vitro route 2 times daily As needed. Qty: 200 each, Refills: 5    Associated Diagnoses: DM type 2, uncontrolled, with lower extremity ulcer (HCC)      Insulin Pen Needle 31G X 5 MM MISC 1 each by Does not apply route daily  Qty: 100 each, Refills: 5    Associated Diagnoses: DM type 2, uncontrolled, with lower extremity ulcer ([address]      Lancets MISC 1 each by Does not apply route 2 times daily PHARMACY MAY SUBSTITUTE TO TRUE METRIX LANCETS  Qty: 100 each, Refills: 5    Associated Diagnoses: DM type 2, uncontrolled, with lower extremity ulcer (HCC)      Gauze Pads & Dressings MISC Please dispense 4x8 guaze, kerlix, and ace  Qty: 1 each, Refills: 2             Time Spent on discharge is more than 45 minutes in the examination, evaluation, counseling and review of medications and discharge plan. Signed: Tamar López MD   Internal Medicine Resident, PGY-1  11/1/2021      Thank you Dominique Wakefield MD for the opportunity to be involved in this patient's care. If you have any questions or concerns please feel free to contact me. I have personally seen and evaluated this patient.  I discussed findings and management with the resident, and agree as documented in this note     Recommended Labs or Other Treatments After Discharge:   Lasix increased to 40 mg bid  Patient is on Methadone for chronic back pain x 20 yrs  For now we will give prescription for oxycodone 5 mg x 2 days and need coordination with methadone clinic  I have stopped methadone, tried lower dose but despite that she gets lethagic and zones out  Recommend weaning of narcotics in the next couple of days    INFUSION ORDERS: modified 10/16  Vancomycin 750 mg iv daily through 11/26  Meropenem 1 gm iv q 12 through 11/26  - Diagnosis - DM foot osteomyelitis   - First dose given in hospital  - PICC   - Disposition / date discharge  - Check CBC w diff, CMP, ESR, CRP, CK every Mon or Tue - FAX result to [phone]  - Call with antibiotic / infusion issues, [phone]  - Call with any change in status, transfer in or out of a facility or to hospital - [phone]  - No f/u in outpatient ID office necessary    My time spent reviewing discharge plan and follow ups with resident, along with performing independent review of discharge medicines along with counseling the patient exceeds 30 minutes.     Ayush Padron MD  Hospitalist  Attending Physician

## 2021-11-01 NOTE — CARE COORDINATION
Case Management Assessment            Discharge Note                    Date / Time of Note: 11/1/2021 1:45 PM                  Discharge Note Completed by: Amaya Rinaldi RN    Patient Name: Taar Warren   YOB: 1965  Diagnosis: Anasarca [R60.1]  Encephalopathy acute [G93.40]  Altered mental status, unspecified altered mental status type [R41.82]  Acute hypoxemic respiratory failure ([facility] [address] [J96.01]   Date / Time: 10/23/2021  6:05 PM    Current PCP: Trace Chavis MD  Clinic patient: Yes    Hospitalization in the last 30 days: No    Advance Directives:  Code Status: Full Code  [place] DNR form completed and on chart: No    Financial:  Payor: Blease Round / Plan: Blease Round / Product Type: *No Product type* /      Pharmacy:    [id number] - [facility], [address] [place] [address] [phone] - F [phone]  [facility] [id number]  Phone: [phone] Fax: [phone]      Assistance purchasing medications?: Potential Assistance Purchasing Medications: No  Assistance provided by Case Management: None at this time    Does patient want to participate in local refill/ meds to beds program?: No    Meds To Beds General Rules:  1. Can ONLY be done Monday- Friday between 8:30am-5pm  2. Prescription(s) must be in pharmacy by 3pm to be filled same day  3. Copy of patient's insurance/ prescription drug card and patient face sheet must be sent along with the prescription(s)  4. Cost of Rx cannot be added to hospital bill. If financial assistance is needed, please contact unit  or ;  or  CANNOT provide pharmacy voucher for patients co-pays  5.  Patients can then  the prescription on their way out of the hospital at discharge, or pharmacy can deliver to the bedside if staff is available. (payment due at time of pick-up or delivery - cash, check, or card accepted)     Able to afford home medications/ co-pay costs: Yes    ADLS:  Current PT AM-PAC Score: 8 /24  Current OT AM-PAC Score: 14 /24      DISCHARGE Disposition:     [address] [facility]               [address] [facility], [address] [address]      REPORT Phone: [phone]      FAX Fax: [phone]            LOC at discharge: Skilled  [address] Completed: Yes,     Notification completed in HENS/PAS?:  Yes : CM has completed HENS online through secure website for SNF admission at [facility] [place] Document ID #: previously submitted: flipped from [facility] to [address] ID : [id number]    IMM Completed:   Not Indicated    Transportation:  Transportation PLAN for discharge: EMS transportation   Mode of Transport: Ambulance stretcher - [facility]  Reason for medical transport: Bed confined: Meets the following criteria 1) unable to get out of bed without assistance or ambulate, 2) unable to safely sit up in a wheelchair, 3) unable to maintain erect seating position in a chair for time needed for transport  Name of [address] [place] [address] [address]: [address]  Phone: [phone]  Time of Transport: 1500    Transport form completed: Yes    Home Care:  [address] ordered at discharge: No  [address]: Not Applicable  Orders faxed: No    Durable Medical Equipment:  DME Provider: defer  Equipment obtained during hospitalization: defer    Home Oxygen and Respiratory Equipment:  Oxygen needed at discharge?: No  [address]: Not Applicable  Portable tank available for discharge?: No    Dialysis:  Dialysis patient: No    Dialysis Center:  Not Applicable    Hospice Services:  Location: Not Applicable  Agency: Not Applicable    Consents signed: Not Indicated    Referrals made at [facility] for outpatient continued care:  Not Applicable    Additional CM Notes:     CM confirmed  D/C to SNF  As noted above: And  HENS  Flipped  :  No pre cert  Needed   Transport arranged:  RN to call report  :    CM faxed  EBONY .   dgtr  notifed   And aware and agreeable. CM  D/ w pt  :  Called  dgtr  :  [facility]  At  Bedside with pt present  And  Discussed  ; All in agreement :       The Plan for Transition of Care is related to the following treatment goals of Anasarca [R60.1]  Encephalopathy acute [G93.40]  Altered mental status, unspecified altered mental status type [R41.82]  Acute hypoxemic respiratory failure ([address] [J96.01]    The Patient and/or patient representative Jasbir Cage and her family were provided with a choice of provider and agrees with the discharge plan Yes    Freedom of choice list was provided with basic dialogue that supports the patient's individualized plan of care/goals and shares the quality data associated with the providers.  Yes    Care Transitions patient: No    Ean Bai RN  [facility]  Case Management Department  Ph: [phone]

## 2021-11-01 NOTE — PLAN OF CARE
Problem: Falls - Risk of:  Goal: Will remain free from falls  Description: Will remain free from falls  11/1/2021 0105 by Estrellita Tinoco RN  Outcome: Ongoing  Note: Pt does not attempt to get out of bed, and is non-weightbearing on both feet d/t wounds and wound vac. Calls for assistance when needed. Safety precautions in place, including bed alarm. Problem: Falls - Risk of:  Goal: Absence of physical injury  Description: Absence of physical injury  Outcome: Ongoing  Note: No falls or injuries to date this hospitalization. Problem: Skin Integrity:  Goal: Will show no infection signs and symptoms  Description: Will show no infection signs and symptoms  Outcome: Ongoing  Note: Fungal rash still present in groin folds, but lessening. Foot wounds dressed by podiatry and covered-not observed this night shift. Problem: Skin Integrity:  Goal: Absence of new skin breakdown  Description: Absence of new skin breakdown  11/1/2021 0105 by Estrellita Tinoco RN  Outcome: Ongoing  Note: No new skin breakdown noted. Problem: Pain:  Goal: Pain level will decrease  Description: Pain level will decrease  Outcome: Ongoing  Note: Pt denies pain except when being moved side to side in bed to change diaper. She then complains of rib pain. This resolves when at rest.       Problem: Gas Exchange - Impaired:  Goal: Levels of oxygenation will improve  Description: Levels of oxygenation will improve  11/1/2021 0105 by Estrellita Tinoco RN  Outcome: Ongoing  Note: O2 saturations adequate, in low to mid 90's on 3L n/c. Pt found several times (while awake, eyes open and with me in the room) removing her nasal cannula. When confronted about it, pt expressed that she was not even aware she was doing that.

## 2021-11-01 NOTE — PROGRESS NOTES
Clinical Pharmacy Consult Note    Vancomycin - Management by Pharmacy    Consult Date(s): 10/24/21  Consulting Provider(s): Dr. Corrinne Sierras / Plan  1) Hx of Bilateral Foot Infection / Osteomyelitis - Vancomycin + Meropenem   Concurrent Antimicrobials:   o Meropenem - Day #31   Day of Vanc Therapy: #31   Current Dosing Method: AUC   Therapeutic Goal: 400-600 mg/mL*h   o Currently on 500 mg IV Q24h. Dose reduced 10/28 after random level showed apparent accumulation. o SCr appears stable at 1.6 mg/dL this AM (fluctuate - 1.5 ~ 1.7 mg/dL). o Current regimen predicted to achieve an AUC of < 400 mg/L*hr.  o Given relatively stable SCr, coupled with lower AUC on current regimen, will adjust dosing frequency to Q18 hours: 500 mg IV Q18 hours. o Regimen predicted to achieve an AUC of 457 mg/L*hr with an associated trough of 16 mg/L.  o Random level scheduled with morning labs tomorrow AM to re-assess kinetics of new regimen.  Will continue to monitor clinical condition and make adjustments to regimen as appropriate. Thank you for allowing us to participate in the care of this patient. Please reach out with any questions. Melita Jesus, MargothD, BCPS  11/1/2021  Wireless: [phone]      Interval update: Pt on 3L NC. No c/o abdominal pain this AM per Surgery. SCr appears stable at 1.6 mg/dL this AM. Pt s/p excisional debridement this AM by Podiatry. Subjective/Objective:  Lachelle Duran is a 60 y.o. female with a PMHx significant for obesity, RASHMI, HFrEF, bilateral foot wounds s/p partial amputation, CKD, chronic pain, DM who was recently admitted 10/1-10/16 for bilateral foot wounds, found to have osteomyelitis requiring I&D. Wound cultures grew Pseudomonas, E. coli and Enterococcus. Pt was discharged on vancomycin and meropenem, with recommendations from ID to continue through 11/26. Patient was then admitted 10/18-10/20 with AMS thought to be 2/2 hypoglycemia.  Insulin was adjusted, and pt was discharged back to SNF. Pt now represents with AMS thought to be 2/2 hypoglycemia and possible accumulation of methadone in setting of CKD 4. Also with RUQ pain this admission - imaging showed biliary sludge, but no plans for intervention, as no signs of acute cholecystitis per surgery. Pharmacy is consulted to dose vancomycin. Pertinent Antimicrobials:  · Meropenem - 1000 mg IV q12h (10/2-current)   · Vancomycin - Pharmacy to dose (10/2-current)    (10/15-10/28)    (10/29-11/1)   500 mg IV q18h (11/1-current)    Of note, known to clinical pharmacy team from earlier two admissions in Oct 2021. Pt was eventually scheduled on vancomycin 750 mg IV q24h, with plans to continue through 11/26/21. Regimen predicted AUC of 503 and trough 16.1 as of 10/19/21. Ht Readings from Last 1 Encounters:   10/28/21 5' (1.524 m)      Wt Readings from Last 1 Encounters:   10/30/21 200 lb 13.4 oz (91.1 kg)     Vancomycin Level(s) / Doses:  Date Time Dose Level / Type of Level Interpretation   10/19 0630 750 mg IV q24h Random = 12.3 mcg/mL Drawn ~23h after previous dose. Predicted AUC = 503 & steady state trough + 16.1.   10/24 0851 750 mg IV q24h Random = 18.7 mcg/mL Drawn ~24h after previous dose. Predicted  & steady state trough = 17.8.   10/28 0657 750 mg IV q24h Random = 20.4 mcg/mL Drawn ~19h after previous dose. Predicts  & steady state trough = 20.5.   10/31 0530 500 mg IV q24h Trough = 13 mcg/mL Drawn ~22 hrs after previous dose. Predicuts  & steady state trough = 10.5   11/1  500 mg IV q18h     Note: Serum levels collected for AUC-based dosing may be high if collected in close proximity to the dose administered. This is not necessarily an indicator of toxicity. Recent Labs     10/30/21  0700 10/31/21  0501 11/01/21  0535   CREATININE 1.7* 1.5*  --    BUN 35* 33*  --    WBC 6.5 8.3 7.7       Estimated Creatinine Clearance: 41 mL/min (A) (based on SCr of 1.5 mg/dL (H)).     Cultures & Sensitivities:  Date Site Micro Susceptibility / Result   10/24 Blood x2 NGTD --   10/14 Rapid flu Negative --

## 2021-11-02 NOTE — PROGRESS NOTES
Nephrology Progress Note          Admit Date: 10/23/2021  Diet: No diet orders on file    Chief Complaint: FAHEEM    INTERVAL HISTORY    Feels better  Shortness of breath: No   UOP: Good   Creat: stable              Medications:     Scheduled Meds:    Continuous Infusions:    PRN Meds:    Objective:   Vitals:   T-max:  Patient Vitals for the past 8 hrs:   BP Temp Temp src Pulse Resp SpO2   11/01/21 1604 120/61 98.6 °F (37 °C) Oral 70 16 93 %   11/01/21 1528 117/66 98.1 °F (36.7 °C) Oral 70 16        Intake/Output Summary (Last 24 hours) at 11/1/2021 2327  Last data filed at 11/1/2021 1538  Gross per 24 hour   Intake 911.34 ml   Output 1000 ml   Net -88.66 ml       Constitutional:  awake, NAD  HEENT:  MMM  Neck: supple, no JVD  Cardiovascular:  S1, S2 reg  Respiratory: Diminished at bases   Abdomen:  +BS, soft, ND  Ext: + lower extremity edema  Skin: dry/intact  CNS: alert, no agitation         LABS:    CBC:   Recent Labs     10/30/21  0700 10/31/21  0501 11/01/21  0535   WBC 6.5 8.3 7.7   HGB 8.4* 9.3* 9.5*   HCT 25.3* 27.5* 28.6*    426 395   MCV 86.6 84.7 86.2     Renal:    Recent Labs     10/30/21  0700 10/31/21  0501 11/01/21  0535    137 136   K 4.8 5.3* 5.1    98* 98*   CO2 33* 31 29   BUN 35* 33* 36*   CREATININE 1.7* 1.5* 1.6*   GLUCOSE 133* 177* 222*   CALCIUM 8.3 8.9 8.7   MG 1.70* 1.80 2.10   PHOS 3.8 3.5 4.5   ANIONGAP 4 8 9     Hepatic:   Recent Labs     10/30/21  0700 10/31/21  0501 11/01/21  0535   LABALBU 2.0* 2.2* 2.1*     Troponin: No results for input(s): TROPONINI in the last 72 hours. BNP: No results for input(s): BNP in the last 72 hours. Lipids: No results for input(s): CHOL, HDL in the last 72 hours. Invalid input(s): LDLCALCU, TRIGLYCERIDE  ABGs:    No results for input(s): PHART, SSC7IXA, PO2ART, NJM2RAY, BEART, THGBART, Y7HTWTNU, RIE2YII in the last 72 hours. INR: No results for input(s): INR in the last 72 hours.   Lactate: No results for input(s): LACTATE in the last 72 hours. Cultures:  -----------------------------------------------------------------  RAD:   XR CHEST PORTABLE   Final Result      Coarse multifocal consolidation with significant progression since 10/23/2021. Correlate for viral pneumonia. Stable cardiomediastinal silhouette. Right jugular central venous catheter without change. US ABDOMEN LIMITED   Final Result   IMPRESSION :      Gallbladder sludge without evidence of acute cholecystitis. Mildly coarsened hepatic echotexture. Correlate with liver function tests. FL MODIFIED BARIUM SWALLOW W VIDEO   Final Result      1. Weak swallowing reflex with delayed AP transit of the bolus. 2.  No aspiration penetration with the various consistencies attempted. Please see speech pathology report for additional findings and recommendations. CT CHEST WO CONTRAST   Final Result   1. No acute intracranial findings. CHEST:         FINDINGS:      Patchy bilateral groundglass opacity and airspace disease in both upper lobes and to a lesser extent in both lower lobes consistent with pneumonia. Small right pleural effusion. No significant left pleural effusion. Normal heart size. No pericardial    effusion. Vascular aorta and main pulmonary artery are normal in caliber. Right IJ venous catheter tip at the cavoatrial junction. There is diffuse anasarca. Small gallstones are noted in the gallbladder. There is a calcification in the tail the    pancreas measuring 1.1 cm which is stable since previous abdominal CT from June 2021. There is a cystic area in the tail the pancreas measuring 2.4 x 1.2 cm which is also unchanged. On previous chest CT from 2011, the cystic area in the tail the pancreas    measured 2.8 x 1.7 cm. No fractures are identified. IMPRESSION:   1. Moderate patchy bilateral airspace disease consistent with multifocal pneumonia including potential atypical viral etiologies. Clinical correlation recommended. 2. Trace right pleural effusion. 3. Anasarca. CT Head WO Contrast   Final Result   1. No acute intracranial findings. CHEST:         FINDINGS:      Patchy bilateral groundglass opacity and airspace disease in both upper lobes and to a lesser extent in both lower lobes consistent with pneumonia. Small right pleural effusion. No significant left pleural effusion. Normal heart size. No pericardial    effusion. Vascular aorta and main pulmonary artery are normal in caliber. Right IJ venous catheter tip at the cavoatrial junction. There is diffuse anasarca. Small gallstones are noted in the gallbladder. There is a calcification in the tail the    pancreas measuring 1.1 cm which is stable since previous abdominal CT from June 2021. There is a cystic area in the tail the pancreas measuring 2.4 x 1.2 cm which is also unchanged. On previous chest CT from 2011, the cystic area in the tail the pancreas    measured 2.8 x 1.7 cm. No fractures are identified. IMPRESSION:   1. Moderate patchy bilateral airspace disease consistent with multifocal pneumonia including potential atypical viral etiologies. Clinical correlation recommended. 2. Trace right pleural effusion. 3. Anasarca. XR CHEST PORTABLE   Final Result   1. Stable diffuse bilateral airspace disease. Assessment/Plan:     FAHEEM on CKD 3  Patient had a creatinine of 2.0 on presentation. Now 1.8 after reciving IV lasix. Baseline appears to be 1.4-1.7. · Not currently on IV fluids  · Encourage p.o. intake  · PO diuretics at discharge   · Avoid nephrotoxic agents    Hypertension  · Lopressor 25 mg twice daily    Anemia  Patient with chronic anemia.      Osteomyelitis  Podiatry consulted  · On Vancomycin and Merem  · Monitor Vanc level    Spoke to Dr Mary Madden MD  11/1/2021    [facility] [address]  Office : [phone]  Fax :[phone]

## 2021-11-03 NOTE — PROGRESS NOTES
This RN informed ICU team that the Pt is yelling out and insisting on pain medication. She states \"my skin in crawling\". Informed them that the Pt will not stay still, is on a non-rebreather mask, and her SBP is 180-200. They stated that they would put something in and look at her. Awaiting new orders.

## 2021-11-03 NOTE — PROGRESS NOTES
Pt extubated per verbal from Dr. Anson Haq. Pt arrived to ICU awake and alert while on ventilator.  Placed on 3lpm.

## 2021-11-03 NOTE — ED PROVIDER NOTES
[address] ENCOUNTER          PHYSICIAN ASSISTANT NOTE       Date of evaluation: 11/3/2021    Chief Complaint     Altered Mental Status (Sent from nursing home for unresponsiveness. Pt given Narcan in route. Pt arrived spitting and trying to kick staff.)      History of Present Illness     Seth Melendez is a 60 y.o. female with past medical history significant for CHF, hypertension, hyperlipidemia, EtOH abuse, on methadone daily, COPD, diabetes, DVT on Eliquis, current osteomyelitis of bilateral lower extremities on vancomycin through PICC line daily, who presents from nursing facility for agitation. Patient is unable to provide history at this time, as she is yelling, spitting, and thrashing around. Per nursing facility, patient became a resident there within the past couple of days. Patient is reportedly supposed to go to methadone clinic every morning, but patient refused yesterday morning and again this morning. Nursing staff found patient unresponsive this morning, including having tried sternal rub. Patient received a dose of intranasal Narcan without any change to symptoms. Received a second dose, woke up and was screaming/fighting the staff. Per nursing facility, most recent oxycodone was given yesterday at 1800. Patient's daughter has visited her but do not believe that anyone also has visited her within the past day. Denies recent fevers, nausea, vomiting. Review of Systems     Review of Systems   See HPI, all others negative.     Past Medical, Surgical, Family, and Social History     She has a past medical history of Asthma, Bacterial vaginosis, Carpal tunnel syndrome, COPD (chronic obstructive pulmonary disease) ([address] Diabetes mellitus type II, Diabetic neuropathy ([address] Diastolic CHF ([address] DVT (deep venous thrombosis) ([address] Dyslipidemia, Dyspareunia, ETOH abuse, HTN (hypertension), Hx of blood clots, Hyperlipidemia, MRSA (methicillin resistant staph aureus) culture positive, Neuropathy, Pancreatitis, Tobacco abuse, and Uses wheelchair. She has a past surgical history that includes  section (unknown); pre-malignant / benign skin lesion excision (7003); other surgical history (Left, 2016); Toe amputation (Left, 2017); other surgical history (Right, 10/20/2017); other surgical history (Right, 2018); pr debridement, skin, sub-q tissue,muscle,bone,=<20 sq cm (Right, 2018); Foot Debridement (Right, 2019); Foot Debridement (Right, 2019); Foot Debridement (Right, 2019); Foot Debridement (Left, 10/23/2019); Tonsillectomy; knee surgery (Left); Foot Debridement (Right, 3/29/2021); IR TUNNELED CVC PLACE WO SQ PORT/PUMP > 5 YEARS (10/5/2021); Foot Debridement (10/7/2021); and Foot Debridement (Bilateral, 10/13/2021). Her family history is not on file. She reports that she quit smoking about 3 years ago. Her smoking use included cigarettes. She has a 30.00 pack-year smoking history. She has never used smokeless tobacco. She reports that she does not drink alcohol and does not use drugs. Medications     Previous Medications    AMITRIPTYLINE (ELAVIL) 100 MG TABLET    TAKE 1 TABLET BY MOUTH EVERY NIGHT AT BEDTIME    AMMONIUM LACTATE (LAC-HYDRIN) 12 % LOTION    Apply topically daily Apply to both legs daily    ASPIRIN 81 MG EC TABLET    Take 81 mg by mouth daily    ATORVASTATIN (LIPITOR) 20 MG TABLET    Take 1 tablet by mouth nightly    BLOOD GLUCOSE TEST STRIPS (TRUE METRIX BLOOD GLUCOSE TEST) STRIP    1 each by In Vitro route 2 times daily As needed. ELIQUIS 5 MG TABS TABLET    TAKE 1 TABLET BY MOUTH TWICE DAILY    ESCITALOPRAM (LEXAPRO) 10 MG TABLET    Take 1 tablet by mouth daily    GABAPENTIN (NEURONTIN) 100 MG CAPSULE    Take 2 capsules by mouth 2 times daily for 30 days.     GAUZE PADS & DRESSINGS MISC    Please dispense 4x8 guaze, kerlix, and ace    GLUCAGON 1 MG INJECTION    Inject 1 kit into the muscle as needed (low blood sugar)    HYDRALAZINE (APRESOLINE) 50 MG TABLET    Take 1 tablet by mouth every 8 hours    INSULIN ASPART (NOVOLOG FLEXPEN) 100 UNIT/ML INJECTION PEN    Inject 8 Units into the skin 3 times daily (before meals)    INSULIN GLARGINE (LANTUS SOLOSTAR) 100 UNIT/ML INJECTION PEN    Inject 7 Units into the skin nightly    INSULIN PEN NEEDLE 31G X 5 MM MISC    1 each by Does not apply route daily    LANCETS MISC    1 each by Does not apply route 2 times daily PHARMACY MAY SUBSTITUTE TO TRUE METRIX LANCETS    MEROPENEM (MERREM) 1 G INJECTION    Inject 1,000 mg into the muscle every 12 hours    METOPROLOL SUCCINATE (TOPROL XL) 50 MG EXTENDED RELEASE TABLET    Take 1 tablet by mouth daily    NYSTATIN (MYCOSTATIN) 164119 UNIT/GM POWDER    Apply topically 2 times daily Apply to right breast and groin area BID    OMEPRAZOLE (PRILOSEC) 20 MG DELAYED RELEASE CAPSULE    Take 20 mg by mouth daily    TORSEMIDE (DEMADEX) 20 MG TABLET    Take 2 tablet by mouth daily    VANCOMYCIN (VANCOCIN) 750 MG INJECTION    Infuse 750 mg intravenously daily       Allergies     She is allergic to sulfa antibiotics. Physical Exam     INITIAL VITALS: BP: (!) 206/94,  , Pulse: 105, Resp: 14, SpO2: 99 %  Physical Exam  Constitutional:       Comments: Agitated, swinging arms around, spitting on staff. HENT:      Head: Normocephalic and atraumatic. Cardiovascular:      Rate and Rhythm: Normal rate. Pulmonary:      Breath sounds: Wheezing present. Comments: Tachypnea, occasionally yelling  Musculoskeletal:      Cervical back: Normal range of motion. Comments: Ace wrappings in place to bilateral lower extremities   Skin:     General: Skin is warm and dry. Neurological:      Mental Status: She is alert. Psychiatric:         Behavior: Behavior is agitated. Diagnostic Results     RADIOLOGY:  CT Head WO Contrast   Final Result      No acute intracranial pathology                 XR CHEST PORTABLE   Final Result   1.  Endotracheal tube tip just directed at the right mainstem bronchus. Catheter can be withdrawn 1.5 to 2 cm.    2. Multifocal airspace disease      Critical finding reported to  Physician: Samaria Schaffer  at 3:18 PM on 11/3/2021, and he confirmed that the tube has been repositioned after review of the above radiograph           LABS:   Results for orders placed or performed during the hospital encounter of 11/03/21   CBC Auto Differential   Result Value Ref Range    WBC 12.3 (H) 4.0 - 11.0 K/uL    RBC 3.52 (L) 4.00 - 5.20 M/uL    Hemoglobin 10.0 (L) 12.0 - 16.0 g/dL    Hematocrit 30.3 (L) 36.0 - 48.0 %    MCV 86.1 80.0 - 100.0 fL    MCH 28.5 26.0 - 34.0 pg    MCHC 33.1 31.0 - 36.0 g/dL    RDW 16.0 (H) 12.4 - 15.4 %    Platelets 145 (H) 155 - 450 K/uL    MPV 8.6 5.0 - 10.5 fL    Neutrophils % 60.0 %    Lymphocytes % 25.2 %    Monocytes % 6.9 %    Eosinophils % 6.9 %    Basophils % 1.0 %    Neutrophils Absolute 7.4 1.7 - 7.7 K/uL    Lymphocytes Absolute 3.1 1.0 - 5.1 K/uL    Monocytes Absolute 0.9 0.0 - 1.3 K/uL    Eosinophils Absolute 0.9 (H) 0.0 - 0.6 K/uL    Basophils Absolute 0.1 0.0 - 0.2 K/uL   Comprehensive Metabolic Panel w/ Reflex to MG   Result Value Ref Range    Sodium 137 136 - 145 mmol/L    Potassium reflex Magnesium 4.7 3.5 - 5.1 mmol/L    Chloride 99 99 - 110 mmol/L    CO2 23 21 - 32 mmol/L    Anion Gap 15 3 - 16    Glucose 200 (H) 70 - 99 mg/dL    BUN 51 (H) 7 - 20 mg/dL    CREATININE 2.0 (H) 0.6 - 1.1 mg/dL    GFR Non-African American 26 (A) >60    GFR  31 (A) >60    Calcium 9.4 8.3 - 10.6 mg/dL    Total Protein 7.3 6.4 - 8.2 g/dL    Albumin 2.7 (L) 3.4 - 5.0 g/dL    Albumin/Globulin Ratio 0.6 (L) 1.1 - 2.2    Total Bilirubin <0.2 0.0 - 1.0 mg/dL    Alkaline Phosphatase 348 (H) 40 - 129 U/L    ALT 48 (H) 10 - 40 U/L    AST 90 (H) 15 - 37 U/L   Troponin   Result Value Ref Range    Troponin 0.04 (H) <0.01 ng/mL   Brain Natriuretic Peptide   Result Value Ref Range    Pro-BNP 1,697 (H) 0 - 124 pg/mL Blood Gas, Venous   Result Value Ref Range    pH, Brody 7.269 (L) 7.350 - 7.450    pCO2, Brody 57.3 (H) 41.0 - 51.0 mmHg    pO2, Brody 48.6 (H) 25 - 40 mmHg    HCO3, Venous 26.2 24.0 - 28.0 mmol/L    Base Excess, Brody -0.5 -2.0 - 3.0 mmol/L    O2 Sat, Brody 77 Not established %    Carboxyhemoglobin 1.6 (H) 0.0 - 1.5 %    MetHgb, Brody <0.0 0.0 - 1.5 %    TC02 (Calc), Brody 28 mmol/L    Hemoglobin, Brody, Reduced 22.80 %   Lactic Acid, Plasma   Result Value Ref Range    Lactic Acid 4.5 (HH) 0.4 - 2.0 mmol/L   POCT Glucose   Result Value Ref Range    POC Glucose 231 (H) 70 - 99 mg/dl    Performed on ACCU-CHEK    EKG 12 Lead   Result Value Ref Range    Ventricular Rate 118 BPM    Atrial Rate 118 BPM    P-R Interval 172 ms    QRS Duration 94 ms    Q-T Interval 342 ms    QTc Calculation (Bazett) 479 ms    P Axis 58 degrees    R Axis 45 degrees    T Axis 65 degrees    Diagnosis       EKG performed in ER and to be interpreted by ER physician. Confirmed by MD, ER ([id number]),  Barb Ladd ([phone]) on 11/3/2021 2:44:31 PM     RECENT VITALS:  BP: (!) 196/82,  , Pulse: 94, Resp: 16, SpO2: 99 %     Procedures       ED Course     Nursing Notes, Past Medical Hx,Past Surgical Hx, Social Hx, Allergies, and Family Hx were reviewed. The patient was given the following medications:  Orders Placed This Encounter   Medications    EPINEPHrine 1 MG/ML injection     Naila Sanchez: cabinet override    naloxone (NARCAN) 2 MG/2ML injection     Naila Sanchez: cabinet override    etomidate (AMIDATE) 2 MG/ML injection     Naila Sanchez: cabinet override    rocuronium (ZEMURON) 50 MG/5ML injection     Naila Sanchez: cabinet override    propofol 1000 MG/100ML injection     Tameka Torres: cabinet override    0.9 % sodium chloride bolus    propofol injection    cefepime (MAXIPIME) 2000 mg IVPB minibag     Order Specific Question:   Antimicrobial Indications     Answer:    Other     Order Specific Question:   Other Abx Indication Answer:   acute resp failure    DISCONTD: vancomycin (VANCOCIN) 2,000 mg in dextrose 5 % 500 mL IVPB     Order Specific Question:   Antimicrobial Indications     Answer: Other     Order Specific Question:   Other Abx Indication     Answer:   Acute hypoxemic respiratory failure       CONSULTS:  [place] [id number] Liz Bernal / ASSESSMENT / Alfredo Frank is a 60 y.o. female presenting for further evaluation of agitation after receiving Narcan. Patient acutely agitated, swinging arms, spitting, and fighting with staff. Attempted to restrain patient. Patient had multiple episodes of emesis, turned patient to side. Patient had an episode in which she became cyanotic and apneic. Started bagging patient and began CPR for <1 minute. No epinephrine was used. Patient was given a dose of IV naloxone and did appear to start breathing on her own again. Patient still could not answer questions appropriately, unable to protect her airway, so intubation was performed by my attending physician, Dr. Yandel Huerta. Lab work obtained including VBG showing a respiratory acidosis with pH of 7.26. CXR showed below, so ET tube repositioned:  1. Endotracheal tube tip just directed at the right mainstem bronchus. Catheter can be withdrawn 1.5 to 2 cm. 2. Multifocal airspace disease      Critical finding reported to  Physician: Coni Enriquez  at 3:18 PM on 11/3/2021, and he confirmed that the tube has been repositioned after review of the above radiograph      Lactate elevated at 4.5. Leukocytosis of 12,300. Given chest x-ray showing multifocal airspace disease, patient already on vancomycin, so cefepime was initiated as well, as patient is presenting from nursing facility. Patient will be admitted to ICU for ongoing management of acute hypoxemic respiratory failure. This patient was also evaluated by the attending physician. All care plans were discussed and agreed upon.     Clinical Impression 1. Acute hypoxemic respiratory failure (HCC)        Disposition     PATIENT REFERRED TO:  No follow-up provider specified.     DISCHARGE MEDICATIONS:  New Prescriptions    No medications on file       [id number] Iain Gonzalez PA-C  11/03/21 0339

## 2021-11-03 NOTE — CONSULTS
Pulmonary Consult    Patient's PCP: Marcus Beckett MD  Referred by: Brenda Saha MD for respiratory arrest    HISTORY OF PRESENT ILLNESS:    This is a  60 y.o. female with PMH of drug use and others as listed below presented to the hospital with AMS. She has recent admission with AMS on 10/18 and was discharged on 10/20 and a 2nd admission on 10/23 with AMS and respiratory failure and discharged on   She has chronic pain and takes methadone. On the last admission it was felt that it is contributing to changes in mental status and was discontinued on discharge. It is reported that she goes to methadone clinic but she was refusing it in the last two days  On arrival she was altered and cyanotic.  she lost pulse but ROSC was achieved within a minute. Narcan was given and pt became agitated and was intubated, eventually she was intubated.           Past Medical / Surgical History:    Past Medical History:   Diagnosis Date    Asthma 2004    Bacterial vaginosis 2008    Carpal tunnel syndrome 2007    COPD (chronic obstructive pulmonary disease) ([address]     Diabetes mellitus type II 2007    10/1/20 pt states does accucheck 2x/day at home    Diabetic neuropathy ([address]     Diastolic CHF ([address]     DVT (deep venous thrombosis) ([address] 2004    Dyslipidemia 2009    Dyspareunia 2009    ETOH abuse 2007    HTN (hypertension)     Hx of blood clots     Hyperlipidemia     MRSA (methicillin resistant staph aureus) culture positive 2017; 2017    foot; leg     Neuropathy 2009    polyneuropathy    Pancreatitis 2004    Tobacco abuse 2008    Uses wheelchair     also uses walker     Past Surgical History:   Procedure Laterality Date     SECTION  unknown    FOOT DEBRIDEMENT Right 2019    INCISION AND DRAINAGE WITH APPLICATION OF STRAVIX GRAFT RIGHT FOOT performed by Tana Hampton DPM at [address] Right 2019 RIGHT FOOT INCISION AND DRAINAGE WITH STAGING TRANSMETATARSAL AMPUTATION performed by Albert Posadas DPM at [address] [facility] Right 7/5/2019    RIGHT FOOT DEBRIDEMENT INCISION AND DRAINAGE, OPEN DIABETIC FOOT ULCER WITH GRAFT PLACEMENT performed by Albert Posadas DPM at [address] [facility] Left 10/23/2019    LEFT FOOT INCISION AND DRAINAGE , DEBRIDEMENT OF OPEN WOUND, APPLICATION OF STRAVIX GRAFT performed by Albert Posadas DPM at [address] [facility] Right 3/29/2021    INCISION AND DRAINAGE, DEBRIDEMENT OF DIABETIC WOUND WITH PLACEMENT OF STRAVIX GRAFT RIGHT FOOT performed by Albert Posadas DPM at [address] [facility]  10/7/2021    BILATERAL LOWER EXTREMITY INCISION AND DRAINAGE, RIGHT FOOT 4TH RAY RESECTION, LEFT FOOT 5TH RAY RESECTION performed by Albert Posadas DPM at [address] [facility] Bilateral 10/13/2021    REPEAT INCISION AND DRAINAGE OF ALL NONVIABLE SOFT TISSUE AND BONE/ PARTIAL CUBOID RESECTION/ BONY BIOPSY AS NEEDED/ WOUND VAC RIGHT LOWER EXTREMITY performed by Albert Posadas DPM at [address] IR TUNNELED [address] 5 YEARS  10/5/2021    IR TUNNELED CATHETER PLACEMENT GREATER THAN 5 YEARS 10/5/2021 [facility] SPECIAL PROCEDURES    KNEE SURGERY Left     from falling off ladder -- has screws in place pt report    OTHER SURGICAL HISTORY Left 05/25/2016    I & D left foot    OTHER SURGICAL HISTORY Right 10/20/2017    RIGHT GASTROC LENGTHENING ENDOSCOPIC, INJECTION OF AMNI GRAFT    OTHER SURGICAL HISTORY Right 04/26/2018    Diabetic foot ulcer I&D w/ integra graft application    HI DEBRIDEMENT, SKIN, SUB-Q TISSUE,MUSCLE,BONE,=<20 SQ CM Right 8/17/2018    RIGHT FOOT DEBRIDEMENT INCISION AND DRAINAGE, PARTIAL 5TH RAY AMPUTATION performed by Albert Posadas DPM at [address] PRE-MALIGNANT / [address]  7/7003    cryotherapy done on lesion    TOE AMPUTATION Left 02/24/2017    AMPUTATION LEFT GREAT TOE                 TONSILLECTOMY       Prior to Admission:    No current facility-administered medications on file prior to encounter. Current Outpatient Medications on File Prior to Encounter   Medication Sig Dispense Refill    gabapentin (NEURONTIN) 100 MG capsule Take 2 capsules by mouth 2 times daily for 30 days. 120 capsule 0    torsemide (DEMADEX) 20 MG tablet Take 2 tablet by mouth daily 30 tablet 0    insulin glargine (LANTUS SOLOSTAR) 100 UNIT/ML injection pen Inject 7 Units into the skin nightly 5 pen 0    glucagon 1 MG injection Inject 1 kit into the muscle as needed (low blood sugar)      aspirin 81 MG EC tablet Take 81 mg by mouth daily      omeprazole (PRILOSEC) 20 MG delayed release capsule Take 20 mg by mouth daily      ammonium lactate (LAC-HYDRIN) 12 % lotion Apply topically daily Apply to both legs daily      insulin aspart (NOVOLOG FLEXPEN) 100 UNIT/ML injection pen Inject 8 Units into the skin 3 times daily (before meals)      vancomycin (VANCOCIN) 750 MG injection Infuse 750 mg intravenously daily      meropenem (MERREM) 1 g injection Inject 1,000 mg into the muscle every 12 hours      hydrALAZINE (APRESOLINE) 50 MG tablet Take 1 tablet by mouth every 8 hours 90 tablet 3    metoprolol succinate (TOPROL XL) 50 MG extended release tablet Take 1 tablet by mouth daily 30 tablet 3    ELIQUIS 5 MG TABS tablet TAKE 1 TABLET BY MOUTH TWICE DAILY 180 tablet 1    escitalopram (LEXAPRO) 10 MG tablet Take 1 tablet by mouth daily 30 tablet 2    amitriptyline (ELAVIL) 100 MG tablet TAKE 1 TABLET BY MOUTH EVERY NIGHT AT BEDTIME 30 tablet 2    atorvastatin (LIPITOR) 20 MG tablet Take 1 tablet by mouth nightly 90 tablet 1    nystatin (MYCOSTATIN) 624153 UNIT/GM powder Apply topically 2 times daily Apply to right breast and groin area BID 30 g 3    blood glucose test strips (TRUE METRIX BLOOD GLUCOSE TEST) strip 1 each by In Vitro route 2 times daily As needed.  200 each 5    Insulin Pen Needle 31G X 5 MM MISC 1 each by Does not apply route daily 100 each 5    Lancets MISC 1 each by Does not apply route 2 times daily PHARMACY MAY SUBSTITUTE TO TRUE METRIX LANCETS 100 each 5    Gauze Pads & Dressings MISC Please dispense 4x8 guaze, kerlix, and ace 1 each 2       Allergies:  Sulfa antibiotics    Social History:   TOBACCO:   reports that she quit smoking about 3 years ago. Her smoking use included cigarettes. She has a 30.00 pack-year smoking history. She has never used smokeless tobacco.     ETOH:   reports no history of alcohol use. Family History:   History reviewed. No pertinent family history. Review of Systems   Unable to perform ROS: Intubated       PHYSICAL EXAM:  BP (!) 194/86   Pulse 80   Temp 98.6 °F (37 °C) (Bladder)   Resp 20   LMP 10/23/2015   SpO2 100%     Physical Exam  Vitals reviewed. Constitutional:       Appearance: She is well-developed. HENT:      Head: Normocephalic and atraumatic. Eyes:      Pupils: Pupils are equal, round, and reactive to light. Neck:      Vascular: No JVD. Cardiovascular:      Rate and Rhythm: Normal rate and regular rhythm. Heart sounds: No murmur heard. Pulmonary:      Effort: Pulmonary effort is normal. No respiratory distress. Breath sounds: Normal breath sounds. No stridor. No wheezing or rales. Abdominal:      General: Bowel sounds are normal.      Palpations: Abdomen is soft. Musculoskeletal:      Cervical back: Neck supple. Skin:     General: Skin is warm and dry. Neurological:      General: No focal deficit present. Mental Status: She is alert.          LABS:  Recent Labs     11/01/21  0535 11/03/21  1444   WBC 7.7 12.3*   HGB 9.5* 10.0*   HCT 28.6* 30.3*    590*                                                                  Recent Labs     11/01/21  0535 11/03/21  1444    137   K 5.1 4.7   CL 98* 99   CO2 29 23   BUN 36* 51*   CREATININE 1.6* 2.0*   GLUCOSE 222* 200*     Recent Labs     11/03/21  1444 associated with type 2 diabetes mellitus, limited to breakdown of skin (HCC)     Opiate dependence (HCC)     Class 2 obesity due to excess calories with body mass index (BMI) of 37.0 to 37.9 in adult     CKD (chronic kidney disease), stage III (HCC)     Diabetic foot infection (HCC)     Chronic osteomyelitis of right foot with draining sinus (HCC)     Acute kidney injury superimposed on CKD (HCC)     Acute blood loss anemia     Aspiration pneumonitis (HCC)     Altered mental status     Chronic systolic heart failure (HCC)     Gastroesophageal reflux disease     Type 2 diabetes mellitus with right diabetic foot infection (HCC)     Systolic CHF, acute (HCC)     Type 2 diabetes mellitus with left diabetic foot infection (HCC)     Lymphedema of left leg     Lymphedema of right lower extremity     Charcot's joint of ankle     Elevated sed rate     Elevated C-reactive protein (CRP)     History of transmetatarsal amputation of right foot ([facility]     History of alcoholism ([facility]     Ex-smoker     History of MRSA infection     CKD (chronic kidney disease) stage 4, GFR 15-29 ml/min (HCC)     Acute on chronic congestive heart failure (HCC)     Acute encephalopathy     Encephalopathy acute     Biliary sludge     Acute hypoxemic respiratory failure (HCC)      Acute respiratory failure on mechanical vent support. CXR with bilateral airspace disease improved from prior. Pro BNP remain elevated but lower than baseline. Troponin is mildly elevated. Lactic acidemia - resolved   FAHEEM  Mild leukocytosis   Osteomyelitis     She was evaluated upon arrival to the ICU. She was on propofol and fentanyl drip but she was alert following commands and has good strength. She was able to pull good volumes on SBT  She was extubated to NC, however O2 requirement gradually increased.   This is likely due to volume overload  Will start her on lasix IV  Start oral antihypertensives    Pt has a high probability of imminent or life-threatening deterioration requiring close monitoring, and highly complex decision-making and/or interventions of high intensity to assess, manipulate, and support his critical organ systems to prevent a likely inevitable decline which could occur if left untreated.      A total critical care time 55 minutes was used. This includes but not limited to examining patient, collaborating with other physicians, monitoring vital signs, telemetry, continuous pulse oximetry, and clinical response to IV medications, documentation time, review and interpretation of laboratory and radiological data, review of nursing notes and old record review. This time excludes any time that may have been spent performing procedures for life threatening organ failure.         Thank you Tyson Cottrell MD for the opportunity to be involved in this patients care.  If you have any questions or concerns please feel free to contact me  Prior

## 2021-11-03 NOTE — CONSULTS
Clinical Pharmacy Progress Note    Vancomycin - Management by Pharmacy    Consult Date(s): 11/3/21  Consulting Provider(s): Dr Phyllis Russo / Plan    1) Osteomyelitis - Vancomycin   Concurrent Antimicrobials: meropenem   Current Dosing Method: Trough-Based Dosing   Therapeutic Goal: 15-20 mcg/mL   Current Dose / Frequency: 750 mg every 24 hours   Plan / Rationale:  o Home dose: vancomycin 750 mg q24h. Last dose 11/3 @0900. Trough today = 13 mcg/mL per NH RN.  o Will continue current dose.  Will continue to monitor clinical condition and make adjustments to regimen as appropriate. 2) H/O DVT - Heparin  · Patient is on Eliquis at [address] [place] [address],[address] (last dose likely this AM at [address] [place] [address],[address])  · Will adjust monitoring parameters to aPTT for Eliquis washout. Please call with any questions. Margoth OmalleyD, BCPS  Main pharmacy: [id number]  11/3/2021 7:49 PM        Interval update: Re-admitted from [address] [place] [address],[facility] Floor d/t AMS. Subjective/Objective: Ms. Lizzy Donaldson is a 60 y.o. female with a PMHx significant for osteomyelitis on vanc/meropenem in NH, CKD4, DM, narcotic dependent chronic pain, admitted for AMS which responded to Narcan. In the ED, patient became combative and went into respiratory distress requiring intubation. Per the ED, patient also vomited several times and there is concern for new aspiration PNA. Pharmacy has been consulted to continue patient's vancomycin dosing. Height:   Ht Readings from Last 1 Encounters:   10/28/21 5' (1.524 m)     Weight:   Wt Readings from Last 1 Encounters:   10/30/21 200 lb 13.4 oz (91.1 kg)       Level(s) / Doses:    Date Time Dose Level / Type of Level Interpretation                 Note: Serum levels collected for AUC-based dosing may be high if collected in close proximity to the dose administered. This is not necessarily an indicator of toxicity.     Cultures & Sensitivities:    Date Site Micro Susceptibility / Result                 Labs / Ancillary Data:    Estimated Creatinine

## 2021-11-03 NOTE — ED NOTES
Pt vomiting during compressions. Pt's mouth suctioned between ventilations.       Dominic Russo RN  11/03/21 0077

## 2021-11-03 NOTE — CONSULTS
Clinical Pharmacy Consult Note       Pharmacy consulted by LIV Saucedo in ED to order first dose of vancomycin. Patient Data:     Recent Labs     11/01/21  0535 11/03/21  1444    137   K 5.1 4.7   CL 98* 99   CO2 29 23   PHOS 4.5  --    BUN 36* 51*   CREATININE 1.6* 2.0*       Estimated Creatinine Clearance: 31 mL/min (A) (based on SCr of 2 mg/dL (H)). Recent Labs     11/01/21  0535 11/03/21  1444   WBC 7.7 12.3*   HGB 9.5* 10.0*   HCT 28.6* 30.3*   MCV 86.2 86.1    590*       Height:    Weight:     Plan:    Patient is on vancomycin 750 mg IV q24h per NH RN. Last dose was 11/3 @ 0900. Last trough drawn was this morning = 13 mcg/mL. Patient does not need to be re-dosed today. Please note this consult covers ED vancomycin dose only. If admitting provider would like further vancomycin dose management by pharmacy, please place additional consult order. Thank you for the consult. Please call with any questions.   Bartholome Sever, PharmD, BCPS  Main pharmacy: [id number]  11/3/2021 4:13 PM

## 2021-11-03 NOTE — H&P
ICU HISTORY AND PHYSICAL       Hospital Day: 0  ICU Day: 0                                                         Code:Prior  Admit Date: 11/3/2021  PCP: Marcus Beckett MD                                  CC: Unresponsiveness    HISTORY OF PRESENT ILLNESS:     Alina Fang is a 60 yoF with a PMH osteomyelitis (on vanc and merrem), CKD4, DVT, DM2 complicated by lower extremity ulcers, HTN narcotic dependent chronic pain (on Methadone) who presented from facility with AMS. Per report, patient was lethargic at nursing home, not responding to sternal rub, after receiving 2 doses of narcan she became combative. In ED, patient was combative, spitting and fighting back staff and suddenly became apneic and cyanotic. It appeared patient went into a respiratory arrest. She lost pulse, CPR was started, patient was bagged and given narcan after which she started breathing again and was once again combative. Patient was subsequently intubated for airway protection. Per ED, patient also vomited several times and there was concern for aspiration. Of note, patient has been on methadone for about 20 years. She is seen at [facility] (reached at [phone]). On prior admission 10/23, there was concerns that patient's somnolence was caused by methadone and decision was made to discontinue it with recommendations that she follow up at methadone clinic. She was discharged on oxycodone 5 mg for 2 days. She is also being treated with vanc and merrem for osteomyelitis.            PAST HISTORY:     Past Medical History:   Diagnosis Date    Asthma 05/14/2004    Bacterial vaginosis 04/2008    Carpal tunnel syndrome 05/2007    [facility] obstructive [facility] disease[facility] [address] [facility]     Diabetes mellitus type II 08/2007    10/1/20 pt states does accucheck 2x/day at home    Diabetic neuropathy ([address] 01/6992    Diastolic CHF ([facility] [address]     DVT (deep venous thrombosis) ([facility] [address] 03/2004    Dyslipidemia 05/2009    Dyspareunia 2009    ETOH abuse 2007    HTN (hypertension)     Hx of blood clots     Hyperlipidemia     MRSA (methicillin resistant staph aureus) culture positive 2017; 2017    foot; leg     Neuropathy 2009    polyneuropathy    Pancreatitis 2004    Tobacco abuse 2008    Uses wheelchair     also uses walker       Past Surgical History:   Procedure Laterality Date     SECTION  unknown    FOOT DEBRIDEMENT Right 2019    INCISION AND DRAINAGE WITH APPLICATION OF STRAVIX GRAFT RIGHT FOOT performed by Albert Posadas DPM at [address] Right 2019    RIGHT FOOT INCISION AND DRAINAGE WITH STAGING TRANSMETATARSAL AMPUTATION performed by Albert Posadas DPM at [address] Right 2019    RIGHT FOOT DEBRIDEMENT INCISION AND DRAINAGE, OPEN DIABETIC FOOT ULCER WITH GRAFT PLACEMENT performed by Albert Posadas DPM at [address] Left 10/23/2019    LEFT FOOT INCISION AND DRAINAGE , DEBRIDEMENT OF OPEN WOUND, APPLICATION OF STRAVIX GRAFT performed by Albert Posadas DPM at [address] Right 3/29/2021    INCISION AND DRAINAGE, DEBRIDEMENT OF DIABETIC WOUND WITH PLACEMENT OF STRAVIX GRAFT RIGHT FOOT performed by Albert Posadas DPM at [address]  10/7/2021    BILATERAL LOWER EXTREMITY INCISION AND DRAINAGE, RIGHT FOOT 4TH RAY RESECTION, LEFT FOOT 5TH RAY RESECTION performed by Albert Posadas DPM at [address] Bilateral 10/13/2021    REPEAT INCISION AND DRAINAGE OF ALL NONVIABLE SOFT TISSUE AND BONE/ PARTIAL CUBOID RESECTION/ BONY BIOPSY AS NEEDED/ WOUND VAC RIGHT LOWER EXTREMITY performed by Albert Posadas DPM at [address] IR TUNNELED [address] 5 YEARS  10/5/2021    IR TUNNELED CATHETER PLACEMENT GREATER THAN 5 YEARS 10/5/2021 [facility] SPECIAL PROCED[facility]    KNEE SURGERY Left     from falling off ladder -- has screws in place pt report    OTHER SURGICAL HISTORY Left 05/25/2016    I & D left foot    OTHER SURGICAL HISTORY Right 10/20/2017    RIGHT GASTROC LENGTHENING ENDOSCOPIC, INJECTION OF AMNI GRAFT    OTHER SURGICAL HISTORY Right 04/26/2018    Diabetic foot ulcer I&D w/ integra graft application    FL DEBRIDEMENT, SKIN, SUB-Q TISSUE,MUSCLE,BONE,=<20 SQ CM Right 8/17/2018    RIGHT FOOT DEBRIDEMENT INCISION AND DRAINAGE, PARTIAL 5TH RAY AMPUTATION performed by Leticia Murray DPM at [address] [facility] / [facility]  4/7597    cryotherapy done on lesion    TOE AMPUTATION Left 02/24/2017    AMPUTATION LEFT GREAT TOE                 TONSILLECTOMY         SocialHistory:   The patient lives at nursing facility    Alcohol: none  Illicit drugs: on methadone  Tobacco:  none    Family History:  History reviewed. No pertinent family history. MEDICATIONS:     No current facility-administered medications on file prior to encounter. Current Outpatient Medications on File Prior to Encounter   Medication Sig Dispense Refill    gabapentin (NEURONTIN) 100 MG capsule Take 2 capsules by mouth 2 times daily for 30 days.  120 capsule 0    torsemide (DEMADEX) 20 MG tablet Take 2 tablet by mouth daily 30 tablet 0    insulin glargine (LANTUS SOLOSTAR) 100 UNIT/ML injection pen Inject 7 Units into the skin nightly 5 pen 0    glucagon 1 MG injection Inject 1 kit into the muscle as needed (low blood sugar)      aspirin 81 MG EC tablet Take 81 mg by mouth daily      omeprazole (PRILOSEC) 20 MG delayed release capsule Take 20 mg by mouth daily      ammonium lactate (LAC-HYDRIN) 12 % lotion Apply topically daily Apply to both legs daily      insulin aspart (NOVOLOG FLEXPEN) 100 UNIT/ML injection pen Inject 8 Units into the skin 3 times daily (before meals)      vancomycin (VANCOCIN) 750 MG injection Infuse 750 mg intravenously daily      meropenem (MERREM) 1 g injection Inject 1,000 mg into the muscle every 12 hours      hydrALAZINE (APRESOLINE) 50 MG tablet Take 1 tablet by mouth every 8 hours 90 tablet 3    metoprolol succinate (TOPROL XL) 50 MG extended release tablet Take 1 tablet by mouth daily 30 tablet 3    ELIQUIS 5 MG TABS tablet TAKE 1 TABLET BY MOUTH TWICE DAILY 180 tablet 1    escitalopram (LEXAPRO) 10 MG tablet Take 1 tablet by mouth daily 30 tablet 2    amitriptyline (ELAVIL) 100 MG tablet TAKE 1 TABLET BY MOUTH EVERY NIGHT AT BEDTIME 30 tablet 2    atorvastatin (LIPITOR) 20 MG tablet Take 1 tablet by mouth nightly 90 tablet 1    nystatin (MYCOSTATIN) 322790 UNIT/GM powder Apply topically 2 times daily Apply to right breast and groin area BID 30 g 3    blood glucose test strips (TRUE METRIX BLOOD GLUCOSE TEST) strip 1 each by In Vitro route 2 times daily As needed. 200 each 5    Insulin Pen Needle 31G X 5 MM MISC 1 each by Does not apply route daily 100 each 5    Lancets MISC 1 each by Does not apply route 2 times daily PHARMACY MAY SUBSTITUTE TO TRUE METRIX LANCETS 100 each 5    Gauze Pads & Dressings MISC Please dispense 4x8 guaze, kerlix, and ace 1 each 2         Scheduled Meds:   EPINEPHrine          Continuous Infusions:   propofol 50 mcg/kg/min (11/03/21 1645)    fentaNYL 200 mcg/hr (11/03/21 1757)     PRN Meds:    Allergies: Allergies   Allergen Reactions    Sulfa Antibiotics Hives, Itching and Rash       REVIEW OF SYSTEMS:       History obtained from chart review and unobtainable from patient due to intubation    Review of Systems   Unable to perform ROS: Intubated       PHYSICAL EXAM:       Vitals: BP (!) 181/168   Pulse 86   Temp 98.6 °F (37 °C) (Bladder)   Resp 23   LMP 10/23/2015   SpO2 100%     I/O:  No intake or output data in the 24 hours ending 11/03/21 1805  No intake/output data recorded. No intake/output data recorded. Physical Examination:     Physical Exam  Constitutional:       General: She is not in acute distress. Appearance: She is not ill-appearing.    HENT:      Head: Normocephalic and atraumatic. Eyes:      General: No scleral icterus. Cardiovascular:      Rate and Rhythm: Normal rate and regular rhythm. Heart sounds: No murmur heard. No gallop. Pulmonary:      Effort: No respiratory distress. Breath sounds: Rhonchi present. No rales. Comments: Intubated and sedated  Abdominal:      General: There is no distension. Tenderness: There is no abdominal tenderness. There is no guarding. Musculoskeletal:      Right lower leg: Edema (1+ non-pitting) present. Left lower leg: Edema (1+ non-pitting) present. Skin:     Capillary Refill: Capillary refill takes less than 2 seconds. Coloration: Skin is not pale. Findings: No rash. Neurological:      Comments: Sedated, able to nod yes or no to some questions. Able to follow some simple commands intermittently. Access:   PICC Line: Present prior to admission  Garcia Day#:1  NGT Day#: 1                                            ETT Day#:1  Vent Settings: Vent Mode: AC/VC Rate Set: 14 bmp/Vt Ordered: 500 mL/ /FiO2 : 100 %    No results for input(s): PHART, MHK6UYR, PO2ART in the last 72 hours. DATA:       Labs:  CBC:   Recent Labs     11/01/21  0535 11/03/21  1444   WBC 7.7 12.3*   HGB 9.5* 10.0*   HCT 28.6* 30.3*    590*       BMP:   Recent Labs     11/01/21  0535 11/03/21  1444    137   K 5.1 4.7   CL 98* 99   CO2 29 23   BUN 36* 51*   CREATININE 1.6* 2.0*   GLUCOSE 222* 200*   PHOS 4.5  --      LFT's:   Recent Labs     11/03/21  1444   AST 90*   ALT 48*   BILITOT <0.2   ALKPHOS 348*     Troponin:   Recent Labs     11/03/21  1444   TROPONINI 0.04*     BNP:No results for input(s): BNP in the last 72 hours. ABGs: No results for input(s): PHART, EDN8YNK, PO2ART in the last 72 hours. INR: No results for input(s): INR in the last 72 hours.     U/A:No results for input(s): NITRITE, COLORU, PHUR, LABCAST, WBCUA, RBCUA, MUCUS, TRICHOMONAS, YEAST, BACTERIA, CLARITYU, SPECGRAV, LEUKOCYTESUR, UROBILINOGEN, BILIRUBINUR, BLOODU, GLUCOSEU, AMORPHOUS in the last 72 hours. Invalid input(s): KETONESU    CT Head WO Contrast   Final Result      No acute intracranial pathology                 XR CHEST PORTABLE   Final Result   1. Endotracheal tube tip just directed at the right mainstem bronchus. Catheter can be withdrawn 1.5 to 2 cm. 2. Multifocal airspace disease      Critical finding reported to  Physician: Marcelo Covarrubias  at 3:18 PM on 11/3/2021, and he confirmed that the tube has been repositioned after review of the above radiograph             ASSESSMENT AND PLAN:   Yovany Burris is a 60 y.o. female with a PMH osteomyelitis (on vanc and merrem), CKD4, DVT, DM2 complicated by lower extremity ulcers, HTN narcotic dependent chronic pain (on Methadone) who presented from facility with lethargy and AMS, she developed acute hypoxic respiratory failure 2/2 narcotic overdose. She was admitted to the ICU for further management    Acute hypoxic respiratory failure likely 2/2 narcotic overdose  Intubated in ED for airway protection. Vent setting: RR 14, , PEEP 5, FiO2 100%  Fentanyl 200, propofol 50  - Continue intubation  - Hold all narcotics  - Respiratory cultures sent     Acute encephalopathy likely 2/2 narcotic overdose  CT head negative, likely from methadone use. Responded to narcan. Pulmonary arrest (resolved)  Patient went into respiratory arrest in ED. Coded for < 1 min, CPR and narcan administered. Spontaneously regained pulse and started breathing on her own. Intubated after for airway protection. Osteomyelitis  Cult polymicrobial with Ps aerug (R cipro), E coli, E faecalis.  Seen by ID previously, IV abx ordered (vanc, merrem) through 11/26.   - Continue vanc  - Continue merrem  - ID consulted  - Podiatry consulted    NSTEMI likely 2/2 demand ischemia  Troponin elevated at 0.04.   - Trend troponins    Chronic DVTs  On eliquis at home  - Heparin ggt  - Monitor anti-Xa    DM2   - MDSSI  - Hypoglycemia protocol  - POCT glucose checks  - Holding home lantus as patient is NPO    FAHEEM on CKD stage 4  Likely 2/2 pre-renal   Creatinine baseline Cr 1.4-1.7  - Avoid nephrotoxic agents  - Strict I/Os    AOCD  - Monitor daily CBCs    Possible RASHMI  - F/U outpatient sleep study      Code Status: Full Code  FEN: NPO  PPX:  SQH  DISPO: ICU    This patient has been staffed and discussed with Carola Dotson MD.   -----------------------------  Cande Lopes MD, PGY-1  11/3/2021  6:05 PM

## 2021-11-03 NOTE — PROGRESS NOTES
Pt just arrived from ED. Dr. Otoniel Dunn at bedside. Pt awake, pulling strongly, trying to get to ETT. Pt on 50 of propofol, and 200 of fentanyl at this time and still wide awake. Pt on minimal vent settings at this time. Dr. Otoniel Dunn stated to extubated. RT, Dr. Otoniel Dunn, and this RN plus another RN. Pt extubated to 4L NC and then nonrebreather applied. Will continue to monitor.

## 2021-11-03 NOTE — ED NOTES
Pt is unresponsive and pulseless. CPR initiated by this nurse and MD Bernal. Medic bagging pt with ambubag for ventilation.       Denise Quintero RN  11/03/21 8692

## 2021-11-03 NOTE — ED PROVIDER NOTES
detector, adequate chest rise and adequate pulse oximetry reading. A chest x-ray to verify correct placement of the tube which showed the tip to be right at the shivam and the tube was withdrawn 1.5 cm. The patient tolerated the procedure well. Complications: None      Critical Care:  Due to the immediate potential for life-threatening deterioration due to acute delirium with unclear etiology with brief cardiac arrest requiring chest compressions likely secondary to brief episode of hypoxia which ultimately led to intubation as above, I spent 35 minutes providing critical care. This time excludes time spent performing procedures but includes time spent on direct patient care, history retrieval, review of the chart, and discussions with patient, family, and consultant(s).        Trang Thompson MD  11/03/21 1445

## 2021-11-04 NOTE — CONSULTS
Infectious Diseases Inpatient Consult Note    Reason for Consult:   Fever, encephalopathy  Requesting Physician:   Dr Dinora Merritt  Primary Care Physician:  Amol Garcia MD  History Obtained From:   Pt, EPIC    Admit Date: 11/3/2021  Hospital Day: 2    CHIEF COMPLAINT:       Chief Complaint   Patient presents with    Altered Mental Status     Sent from nursing home for unresponsiveness. Pt given Narcan in route. Pt arrived spitting and trying to kick staff. HISTORY OF PRESENT ILLNESS:      61 yo woman with DM, CKD, HTN, hx DVT  Hx substance abuse, hx chronic pain, on methadone    Admit 10/1 - 16 with LE pain. Dx bilateral foot osteomyelitis, OR debridement 10/7, 10/13  LEFT -   - MRI with 5th MT + prox 5th phalanx destruction, lateral cuboid edema   - OR 10/7 - 5th ray resection; path - 'focal subacute, partially treated osteomyelitis' (L 5th MT)  RIGHT -     - MRI with cuboid, 4th MT OM   - OR 10/7 - R 4th ray, partial cuboid resection - NOT definitive   - Path with 4th MT acute OM, R cuboid 'focal acute osteomyelitis', R cuboid clearance frag 'rare focus of acute' OM    - OR 10/13 - L I&D and closure; R I&D, further cuboid resection, VAC  Wound cult -  polymicrobial with Ps aerug (R cipro), E coli, E faecalis  Discharged on iv vancomycin + meropenem    Admit 10/18-20 with lethargy / less responsive    Admit 10/23 - 11/1 with lethargy / less responsive  Pt had received methadone, had low BS, given narcan. Had hypoxia, required supplemental O2, treated empirically with diuretics and antibiotics    Presents with lethargy / unresponsive state  In ED, received narcan, became combative per ED noted, H&P  Intubated in ED for airway protection and admit to ICU. Started on vancomycin + meropenem    Temp 101.2 at [place]  Today 11/4, extubated. Lethargic.       Past Medical History:    Past Medical History:   Diagnosis Date    Asthma 05/14/2004    Bacterial vaginosis 04/2008    Carpal tunnel syndrome 05/2007  COPD [facility]chronic obstructive pulmonary disease) ([facility]     Diabetes mellitus type II 2007    10/1/20 pt states does accucheck 2x/day at home    Diabetic neuropathy ([facility]     Diastolic CHF ([facility]     DVT (deep venous thrombosis) ([facility] 2004    Dyslipidemia 2009    Dyspareunia 2009    ETOH abuse 2007    HTN (hypertension)     Hx of blood clots     Hyperlipidemia     MRSA (methicillin resistant staph aureus) culture positive 2017; 2017    foot; leg     Neuropathy 2009    polyneuropathy    Pancreatitis 2004    Tobacco abuse 2008    Uses wheelchair     also uses walker       Past Surgical History:    Past Surgical History:   Procedure Laterality Date     SECTION  unknown    FOOT DEBRIDEMENT Right 2019    INCISION AND DRAINAGE WITH APPLICATION OF STRAVIX GRAFT RIGHT FOOT performed by Juliann Gibbons DPM at [address] Right 2019    RIGHT FOOT INCISION AND DRAINAGE WITH STAGING TRANSMETATARSAL AMPUTATION performed by Juliann Gibbons DPM at [address] Right 2019    RIGHT FOOT DEBRIDEMENT INCISION AND DRAINAGE, OPEN DIABETIC FOOT ULCER WITH GRAFT PLACEMENT performed by Juliann Gibbons DPM at [address] Left 10/23/2019    LEFT FOOT INCISION AND DRAINAGE , DEBRIDEMENT OF OPEN WOUND, APPLICATION OF STRAVIX GRAFT performed by Juliann Gibbons DPM at [address] Right 3/29/2021    INCISION AND DRAINAGE, DEBRIDEMENT OF DIABETIC WOUND WITH PLACEMENT OF STRAVIX GRAFT RIGHT FOOT performed by Juliann Gibbons DPM at [address]  10/7/2021    BILATERAL LOWER EXTREMITY INCISION AND DRAINAGE, RIGHT FOOT 4TH RAY RESECTION, LEFT FOOT 5TH RAY RESECTION performed by Juliann Gibbons DPM at [address] Bilateral 10/13/2021    REPEAT INCISION AND DRAINAGE OF ALL NONVIABLE SOFT TISSUE AND BONE/ PARTIAL CUBOID RESECTION/ BONY BIOPSY AS NEEDED/ WOUND VAC RIGHT LOWER EXTREMITY performed by Lee Goodpasture, DPM at [address] IR TUNNELED CATHETER PLACEMENT GREATER THAN 5 YEARS  10/5/2021    IR TUNNELED CATHETER PLACEMENT GREATER THAN 5 YEARS 10/5/2021 [facility] SPECIAL PROCEDURES    KNEE SURGERY Left     from falling off ladder -- has screws in place pt report    OTHER SURGICAL HISTORY Left 05/25/2016    I & D left foot    OTHER SURGICAL HISTORY Right 10/20/2017    RIGHT GASTROC LENGTHENING ENDOSCOPIC, INJECTION OF AMNI GRAFT    OTHER SURGICAL HISTORY Right 04/26/2018    Diabetic foot ulcer I&D w/ integra graft application    TN DEBRIDEMENT, SKIN, SUB-Q TISSUE,MUSCLE,BONE,=<20 SQ CM Right 8/17/2018    RIGHT FOOT DEBRIDEMENT INCISION AND DRAINAGE, PARTIAL 5TH RAY AMPUTATION performed by Lee Goodpasture, DPM at [address] PRE-MALIGNANT / [address]  7/7003    cryotherapy done on lesion    TOE AMPUTATION Left 02/24/2017    AMPUTATION LEFT GREAT TOE                 TONSILLECTOMY         Current Medications:     insulin glargine  7 Units SubCUTAneous Nightly    furosemide  60 mg IntraVENous BID    insulin lispro  0-12 Units SubCUTAneous TID WC    insulin lispro  0-6 Units SubCUTAneous Nightly    sodium chloride flush  5-40 mL IntraVENous 2 times per day    vancomycin  750 mg IntraVENous Q24H    meropenem  1,000 mg IntraVENous Q12H    metoprolol succinate  50 mg Oral Daily       Allergies:  Sulfa antibiotics    Social History:    TOBACCO:    None - past cig use  ETOH:    None   DRUGS:   None   MARITAL STATUS:      OCCUPATION:   None     Patient lives family    Family History:   No immunodeficiency    REVIEW OF SYSTEMS:    No ROS possible     PHYSICAL EXAM:      Vitals:    BP (!) 109/47   Pulse 73   Temp 99.1 °F (37.3 °C) (Oral)   Resp 17   Wt 189 lb 2.5 oz (85.8 kg)   LMP 10/23/2015   SpO2 97%   BMI 36.94 kg/m²     GENERAL: No apparent distress.   100% NRB  HEENT: Membranes moist, no oral lesion  NECK:  Supple, no <=0.5 mcg/mL   gentamicin Sensitive <=4 mcg/mL   meropenem Sensitive <=1 mcg/mL   piperacillin-tazobactam Sensitive <=16 mcg/mL   trimethoprim-sulfamethoxazole Sensitive <=2/38 mcg/mL      Enterococcus  faecalis   Antibiotic Interpretation ISAAC   ampicillin Sensitive <=2 mcg/mL   vancomycin Sensitive 2 mcg/mL         IMAGIN/4 CXR  Impression:   Interval extubation. Stable appearance of the lungs. 11/3 Head CT  'No acute intracranial pathology'    11/3 CXR  1. Endotracheal tube tip just directed at the right mainstem bronchus. Catheter can be withdrawn 1.5 to 2 cm. 2. Multifocal airspace disease     10/3 L foot MRI  Impression   Osteomyelitis involving the fifth metatarsal and fifth proximal phalanx with extensive osseous destruction. Mild osteomyelitis involving the lateral aspect of the cuboid. Prior partial first digit amputation      10/3 R foot MRI   Impression   Multiple prior amputations. Soft tissue ulceration lateral to the cuboid with mild acute osteomyelitis involving the lateral base of the fourth metatarsal and more distal plantar aspect of the remaining fourth metatarsal shaft as well as the lateral aspect of the cuboid.        IMPRESSION:      Obesity (BMI 37), DM, neuropathy, HTN, HL, CKD, chronic pain  Hx DM foot infection / surgeries - has R TMA, L hallux resection     L foot wound infection / osteomyelitis   - MRI with 5th MT + prox 5th phalanx destruction, lateral cuboid edema   - OR 10/7 - 5th ray resection; path - 'focal subacute, partially treated osteomyelitis' (L 5th MT)     R foot wound infection / osteomyelitis    - MRI with cuboid, 4th MT OM   - OR 10/7 - R 4th ray, partial cuboid resection - NOT definitive   - Path with 4th MT acute osteomyelitis, R cuboid 'focal acute osteomyelitis', R cuboid clearance frag 'rare focus of acute osteomyelitis'   - OR 10/13 - L I&D and closure; R I&D, further cuboid resection, VAC   - Cult polymicrobial with Ps aerug (R cipro), E coli, E faecalis     Narcotic dependence  Encephalopathy     Fever   - risk aspiration, line infection, progression pedal / foot infection (had recommended R amputation), fungemia    RECOMMENDATIONS:    Cont vancomycin, meropenem   Will f/u on BC, resp cult  May need line removed   Will confer with Podiatry regarding foot wounds    Medical Decision Making: The following items were considered in medical decision making:  Discussion of patient care with other providers  Reviewed clinical lab tests  Reviewed radiology tests  Reviewed other diagnostic tests/interventions  Independent review of radiologic images  Microbiology cultures and other micro tests reviewed      Risk of Complications/Morbidity: High   Illness(es)/ Infection present that pose threat to bodily function. There is potential for severe exacerbation of infection/side effects of treatment.   Therapy requires intensive monitoring for antimicrobial agent toxicity    Discussed with JAKI Vergara MD

## 2021-11-04 NOTE — PROGRESS NOTES
Clinical Pharmacy Progress Note    Vancomycin - Management by Pharmacy    Consult Date(s): 11/3/21  Consulting Provider(s): Dr Anita Hansen / Plan    1) Osteomyelitis - Vancomycin   Concurrent Antimicrobials: meropenem   Current Dosing Method: Trough-Based Dosing   Therapeutic Goal: 15-20 mcg/mL   Current Dose / Frequency: 750 mg every 24 hours   Plan / Rationale:  o Renal function is stable. Will continue home regimen from SNF.  o Level on 11/3 AM was 13 mcg/mL per SNF. Plan to check another level in a few days to ensure vancomycin stays within goal therapeutic range.  Will continue to monitor clinical condition and make adjustments to regimen as appropriate. 2) H/O DVT - Heparin  · Patient is on Eliquis at [address] [facility] (last dose likely this AM at [address] [facility])  · Continue aPTT monitoring to allow for Eliquis washout. Can switch back to anti-Xa monitoring 11/6 AM.     Please call with any questions. Janes Hair PharmD, [facility]  Wireless: [phone]  11/4/2021 10:57 AM      Interval update: Tmax 101.2F over the past 24 hrs. Patient remains on NRBM. Subjective/Objective: Ms. Catie Blanco is a 60 y.o. female with a PMHx significant for osteomyelitis on vanc/meropenem in NH, CKD4, DM, narcotic dependent chronic pain, admitted for AMS which responded to Narcan. In the ED, patient became combative and went into respiratory distress requiring intubation. Per the ED, patient also vomited several times and there is concern for new aspiration PNA. Pharmacy has been consulted to continue patient's vancomycin dosing. Height:   Ht Readings from Last 1 Encounters:   10/28/21 5' (1.524 m)     Weight:   Wt Readings from Last 1 Encounters:   11/04/21 189 lb 2.5 oz (85.8 kg)       Level(s) / Doses:    Date Time Dose Level / Type of Level Interpretation                 Note: Serum levels collected for AUC-based dosing may be high if collected in close proximity to the dose administered.  This is not necessarily an indicator of toxicity. Cultures & Sensitivities:    Date Site Micro Susceptibility / Result   11/3 Blood x2 Ordered    11/3 Trach aspirate Collected    11/4 Urine Collected      Labs / Ancillary Data:    Estimated Creatinine Clearance: 30 mL/min (A) (based on SCr of 2 mg/dL (H)).     Recent Labs     11/03/21  1444 11/03/21 2000 11/04/21  0400   CREATININE 2.0*  --  2.0*   BUN 51*  --  48*   WBC 12.3* 10.8 16.4*

## 2021-11-04 NOTE — PROGRESS NOTES
respiratory failure 2/2 AMS. Medications:     Scheduled Meds:   magnesium sulfate  2,000 mg IntraVENous Once    insulin lispro  0-12 Units SubCUTAneous TID WC    insulin lispro  0-6 Units SubCUTAneous Nightly    sodium chloride flush  5-40 mL IntraVENous 2 times per day    vancomycin  750 mg IntraVENous Q24H    meropenem  1,000 mg IntraVENous Q12H    torsemide  40 mg Oral BID    metoprolol succinate  50 mg Oral Daily     Continuous Infusions:   propofol Stopped (11/03/21 1900)    fentaNYL Stopped (11/03/21 1900)    dextrose      sodium chloride      heparin (PORCINE) Infusion 15 Units/kg/hr (11/04/21 0400)     PRN Meds:glucose, dextrose, glucagon (rDNA), dextrose, sodium chloride flush, sodium chloride, ondansetron **OR** ondansetron, polyethylene glycol, acetaminophen **OR** acetaminophen, heparin (porcine), heparin (porcine), labetalol, hydrOXYzine, promethazine    Objective:   Vitals:   T-max:  Patient Vitals for the past 8 hrs:   BP Temp Temp src Pulse Resp SpO2 Weight   11/04/21 0700 (!) 138/53   82 18 95 %    11/04/21 0600 (!) 124/47   78 17 96 % 189 lb 2.5 oz (85.8 kg)   11/04/21 0500 114/60   78 18 95 %    11/04/21 0400 (!) 107/53 99.9 °F (37.7 °C) Axillary 83 18 99 %    11/04/21 0300 (!) 111/57   89 20 97 %    11/04/21 0200 (!) 126/56   92 21 97 %    11/04/21 0100 (!) 160/65   93 29 96 %    11/04/21 0000 (!) 152/61 101.2 °F (38.4 °C) Axillary 88 28 97 %        Intake/Output Summary (Last 24 hours) at 11/4/2021 0752  Last data filed at 11/4/2021 0400  Gross per 24 hour   Intake 571.25 ml   Output 1650 ml   Net -1078.75 ml       Review of Systems  A 10 point review of systems was conducted, significant findings as noted in HPI. Physical Exam  Constitutional:       Appearance: Normal appearance. Comments: Somnolent   HENT:      Head: Normocephalic and atraumatic.       Mouth/Throat:      Mouth: Mucous membranes are moist.   Eyes:      Extraocular Movements: Extraocular movements intact. Conjunctiva/sclera: Conjunctivae normal.      Pupils: Pupils are equal, round, and reactive to light. Cardiovascular:      Rate and Rhythm: Normal rate and regular rhythm. Pulses: Normal pulses. Heart sounds: Normal heart sounds. Pulmonary:      Effort: Pulmonary effort is normal.      Breath sounds: Rhonchi (diffuse bilateral) present. Abdominal:      General: Abdomen is flat. Bowel sounds are normal.      Palpations: Abdomen is soft. Musculoskeletal:         General: Normal range of motion. Cervical back: Normal range of motion. Right lower le+ Edema present. Left lower le+ Edema present. Neurological:      General: No focal deficit present. Mental Status: She is alert. Mental status is at baseline. Comments: Somnolent   Psychiatric:         Mood and Affect: Mood normal.         Behavior: Behavior normal.         LABS:    CBC:   Recent Labs     21  1444 21  0400   WBC 12.3* 10.8 16.4*   HGB 10.0* 10.2* 8.3*   HCT 30.3* 31.2* 25.2*   * 414 419   MCV 86.1 86.9 86.0     Renal:    Recent Labs     21  1444 21  0400    139   K 4.7 4.8   CL 99 102   CO2 23 27   BUN 51* 48*   CREATININE 2.0* 2.0*   GLUCOSE 200* 208*   CALCIUM 9.4 8.3   MG  --  1.70*   PHOS  --  4.9   ANIONGAP 15 10     Hepatic:   Recent Labs     21  1444 21  0400   AST 90*  --    ALT 48*  --    BILITOT <0.2  --    PROT 7.3  --    LABALBU 2.7* 2.0*   ALKPHOS 348*  --      Troponin:   Recent Labs     21  1444 21  2352   TROPONINI 0.04* 0.06* 0.06*     BNP: No results for input(s): BNP in the last 72 hours. Lipids: No results for input(s): CHOL, HDL in the last 72 hours. Invalid input(s): LDLCALCU, TRIGLYCERIDE  ABGs:  No results for input(s): PHART, RNR4CJM, PO2ART, ZNG7UJK, BEART, THGBART, I2GPGQOZ, ZQY7IJG in the last 72 hours.     INR:   Recent Labs     21   INR 1.30*     Lactate: No results for input(s): LACTATE in the last 72 hours. Cultures:  -----------------------------------------------------------------  RAD:   CT Head WO Contrast   Final Result      No acute intracranial pathology                 XR CHEST PORTABLE   Final Result   1. Endotracheal tube tip just directed at the right mainstem bronchus. Catheter can be withdrawn 1.5 to 2 cm. 2. Multifocal airspace disease      Critical finding reported to  Physician: Lind Cheadle  at 3:18 PM on 11/3/2021, and he confirmed that the tube has been repositioned after review of the above radiograph             Assessment/Plan:   Abraham Garcia is a 60 y.o. female with PMH as below notable for chronic osteomyelitis (on [place]), CKD 4, DVT, type 2 diabetes mellitus complicated by lower extremity ulcers, HTN, chronic pain on methadone who presented from a nursing facility due to altered mental status. Patient was admitted to the ICU for further workup and management of acute respiratory failure 2/2 AMS. Neuro/Sedation:   Patient is alert and oriented  Acute encephalopathy 2/2 narcotic overdose  Patient was found unresponsive, regained consciousness after Narcan administration. CT head was negative for acute intracranial abnormality. Patient using methadone for 20 years and previous methadone clinic visit there was concern of somnolence secondary to methadone.   -Avoid narcotics  -COWS protocol     CV:   Hypertensive and afebrile   SBP between 160-200s   STEMI 2/2 likely secondary to demand ischemia  On admission troponin was elevated at 0.04, went up to 0.06, now stable. EKG showed no ischemic changes.  -Continue trending troponins    HTN   Patient has been hypertensive since admission. -200s.    -Metoprolol 50 OD  -Torsemide 40 mg      Chronic DVTs history  Patient was on Eliquis at home.  -Continue heparin drip    Respiratory:    SpO2 90% on NRBM 13L     Acute hypoxic respiratory failure 2/2 likely narcotic overdose  Patient stopped breathing during admission, lost pulse, CPR was initiated and patient restarted breathing after Narcan was given. Patient was intubated for airway protection. Patient was extubated. With increased oxygen requirements  -Continue torsemide  -Hold narcotics  -Follow-up respiratory cultures    Possible RASHMI  Patient with complains of somnolence, BMI 39.  -Recommend outpatient studies      ID:  · Patient febrile 101.2  Osteomyelitis  Wound culture grew polymicrobial with Pseudomonas aeruginosa (resistant to ciprofloxacin), E. coli, E faecalis. seen by ID previously, IV antibiotics Vanco and meropenem through 11/26. WBC 16.   -Continue Vanco Merrem  -Follow-up ID recs  -Follow-up podiatry recs    Renal:   Voiding spontaneous  · I/Os: -1078/ 24 hours  FAHEEM on CKD stage 4  Likely 2/2 pre-renal. Patient had a code con admission. Creatinine baseline Cr 1.4-1.7. Patient has been hypertensive likely due to agitation  -Control BP  -F/u Ur studies  - Avoid nephrotoxic agents  - Strict I/Os    GI:  Diet NPO    Endocrine:  T2DM  Last A1c 8.3 - 44/1/42  -MDSS  -hypoglicemia protocol   -f/u BG levels  -Hold home Lantus while n.p.o    Hematology/Oncology:  AOCD  -Follow-up CBCs      Code Status: Full Code  FEN: Diet NPO  PPX: Heparin sc  DISPO: ICU    Juanita Mireles MD, PGY-1  11/04/21  7:52 AM    This patient will be staffed and discussed with Issa Sood MD.    Patient was seen, examined and discussed with Dr. J Luis Pappas. I agree with the interval history. My physical exam confirms the findings listed below  Chart was reviewed including labs and medical records confirm the findings noted    Acute respiratory failure extubated on 11/4  CXR with bilateral airspace disease improved from prior. Pro BNP remain elevated but lower than baseline. Troponin is mildly elevated.    Lactic acidemia - resolved   FAHEEM  Mild leukocytosis   Osteomyelitis      Switch torsemide to lasix    Will discuss long term pain medications plan with methadone clinic   [place] and lisa   Recheck CXR  Wean down O2 supplement

## 2021-11-04 NOTE — DISCHARGE INSTR - COC
Continuity of Care Form    Patient Name: Casper Handley   :  1965  MRN:  [id number]    Admit date:  11/3/2021  Discharge date:  ***    Code Status Order: Full Code   Advance Directives:      Admitting Physician:  Charles Fraser MD  PCP: Tabitha Benitez MD    Discharging Nurse: [facility] Unit/Room#: 1369/5617-52  Discharging Unit Phone Number: ***    Emergency Contact:   Extended Emergency Contact Information  Primary Emergency Contact: Lucetta Mcardle, [place] [address] Phone: [phone]  Relation: Parent  Secondary Emergency Contact: Chris [address] Phone: [phone]  Relation: Child    Past Surgical History:  Past Surgical History:   Procedure Laterality Date     SECTION  unknown    FOOT DEBRIDEMENT Right 2019    INCISION AND DRAINAGE WITH APPLICATION OF STRAVIX GRAFT RIGHT FOOT performed by Dorcas Willams DPM at [facility] [employer] Right 2019    RIGHT FOOT INCISION AND DRAINAGE WITH STAGING TRANSMETATARSAL AMPUTATION performed by Dorcas Willams DPM at [facility] [employer] Right 2019    RIGHT FOOT DEBRIDEMENT INCISION AND DRAINAGE, OPEN DIABETIC FOOT ULCER WITH GRAFT PLACEMENT performed by Dorcas Willams DPM at [facility] [employer] Left 10/23/2019    LEFT FOOT INCISION AND DRAINAGE , DEBRIDEMENT OF OPEN WOUND, APPLICATION OF STRAVIX GRAFT performed by Dorcas Willams DPM at [facility] Right 3/29/2021    INCISION AND DRAINAGE, DEBRIDEMENT OF DIABETIC WOUND WITH PLACEMENT OF STRAVIX GRAFT RIGHT FOOT performed by Dorcas Willams DPM at [facility] [employer] [facility]  10/7/2021    BILATERAL LOWER EXTREMITY INCISION AND DRAINAGE, RIGHT FOOT 4TH RAY RESECTION, LEFT FOOT 5TH RAY RESECTION performed by Dorcas Willams DPM at [facility] [employer] Bilateral 10/13/2021    REPEAT INCISION AND DRAINAGE OF ALL NONVIABLE SOFT TISSUE AND BONE/ PARTIAL CUBOID RESECTION/ BONY BIOPSY AS NEEDED/ WOUND VAC RIGHT LOWER EXTREMITY performed by Jason Bronson DPM at [facility] [address] TUNNELED [address] 5 YEARS  10/5/2021    IR TUNNELED CATHETER PLACEMENT GREATER THAN 5 YEARS 10/5/2021 [facility] SPECIAL PROCEDURES    KNEE SURGERY Left     from falling off ladder -- has screws in place pt report    OTHER SURGICAL HISTORY Left 05/25/2016    I & D left foot    OTHER SURGICAL HISTORY Right 10/20/2017    RIGHT GASTROC LENGTHENING ENDOSCOPIC, INJECTION OF AMNI GRAFT    OTHER SURGICAL HISTORY Right 04/26/2018    Diabetic foot ulcer I&D w/ integra graft application    MO DEBRIDEMENT, SKIN, SUB-Q TISSUE,MUSCLE,BONE,=<20 SQ CM Right 8/17/2018    RIGHT FOOT DEBRIDEMENT INCISION AND DRAINAGE, PARTIAL 5TH RAY AMPUTATION performed by Jason Bronson DPM at [address]    PRE-MALIGNANT / [address]  7/7003    cryotherapy done on lesion    TOE AMPUTATION Left 02/24/2017    AMPUTATION LEFT GREAT TOE                 TONSILLECTOMY         Immunization History:   Immunization History   Administered Date(s) Administered    COVID-19, Pfizer, PF, 30mcg/0.3mL 06/02/2021, 06/23/2021    Influenza 11/08/2011    Influenza Virus Vaccine 09/12/2014, 10/16/2015, 10/27/2017    Influenza, Marc Golas, 6 mo and older, IM, PF (Flulaval, Fluarix) 01/05/2019    Influenza, Quadv, IM, PF (6 mo and older Fluzone, Flulaval, Fluarix, and 3 yrs and older Afluria) 11/10/2016, 10/03/2019, 10/16/2021    Pneumococcal Polysaccharide ([id number]) 11/13/2015    Tdap (Boostrix, Adacel) 07/22/2014       Active Problems:  Patient Active Problem List   Diagnosis Code    Feet clawing Q66.89    Diabetic neuropathy (HCC) E11.40    Hyperlipidemia E78.5    HTN (hypertension) I10    Amenorrhea N91.2    Dyspareunia [id number]    Neuropathy G62.9    Bacterial vaginosis N76.0, B96.89    Low back pain M54.50    Anomalies of nails Q84.6    Tobacco abuse Z72.0    Carpal tunnel syndrome G56.00    Scalp lesion L98.9    ETOH abuse F10.10 Pseudocyst, pancreas K86.3    Asthma J45.909    Nicotine addiction F17.200    Hypoxia R09.02    Onychomycosis B35.1    Depression F32. A    Anxiety state F41. 1    Tinea pedis B35.3    Burn T30.0    Obesity (BMI 30-39. 9) E66.9    S/P foot surgery, right Z98.890    Open wound of left foot with complication A16.769R    Cellulitis L03.90    Bilateral lower extremity edema R60.0    Chronic multifocal osteomyelitis of left foot ([facility]) M86.372    Open wound of right foot Z79.565A    Acute systolic congestive heart failure ([facility]) I50.21    Acute osteomyelitis of metatarsal bone of right foot ([facility]) M86.171    Bronchitis J40    Cellulitis and abscess of foot L03.119, L02.619    Diabetic polyneuropathy associated with type 2 diabetes mellitus ([facility]) E11.42    Group B streptococcal infection A49.1    Tendonitis, Achilles, right M76.61    Diabetic ulcer of right midfoot associated with type 2 diabetes mellitus, limited to breakdown of skin ([facility]) E11.621, L97.411    Opiate dependence ([facility]) F11.20    Class 2 obesity due to excess calories with body mass index (BMI) of 37.0 to 37.9 in adult E66.09, Z68.37    CKD (chronic kidney disease), stage III ([facility]) N18.30    Diabetic foot infection ([facility]) E11.628, L08.9    Chronic osteomyelitis of right foot with draining sinus ([facility]) M86.471    Acute kidney injury superimposed on CKD ([facility]) N17.9, N18.9    Acute blood loss anemia D62    Aspiration pneumonitis ([facility]) J69.0    Altered mental status R41.82    Chronic systolic heart failure ([facility]) I50.22    Gastroesophageal reflux disease K21.9    Type 2 diabetes mellitus with right diabetic foot infection ([facility]) X40.505, Z58.9    Systolic CHF, acute ([facility]) I50.21    Type 2 diabetes mellitus with left diabetic foot infection ([facility]) E11.628, L08.9    Lymphedema of left leg I89.0    Lymphedema of right lower extremity I89.0    Charcot's joint of ankle M14.679    Elevated sed rate R70.0    Elevated C-reactive protein (CRP) R79.82    History of transmetatarsal amputation of right foot ([facility] Z89.431    [facility] of [facility] F10.21    Ex-smoker Z87.891    History of MRSA infection Z86.14    CKD (chronic kidney disease) stage 4, GFR 15-29 ml/min ([facility]) N18.4    Acute on chronic congestive heart failure ([facility]) I50.9    Acute encephalopathy G93.40    Encephalopathy acute G93.40    Biliary sludge K83.8    Acute hypoxemic respiratory failure ([facility]) J96.01       Isolation/Infection:   Isolation            No Isolation          Patient Infection Status       Infection Onset Added Last Indicated Last Indicated By Review Planned Expiration Resolved Resolved By    None active    Resolved    COVID-19 Rule Out 10/23/21 10/23/21 10/24/21 COVID-19 (Ordered)   10/24/21 Rule-Out Test Resulted    COVID-19 Rule Out 10/18/21 10/18/21 10/18/21 COVID-19, Rapid (Ordered)   10/18/21 Rule-Out Test Resulted    MRSA  08/02/11 08/02/11 Jo Noe RN   05/11/15 Jeramy Mares RN    ca/colonization spt and nares            Nurse Assessment:  Last Vital Signs: BP (!) 135/50   Pulse 86   Temp 99.2 °F (37.3 °C) (Oral)   Resp 20   Wt 189 lb 2.5 oz (85.8 kg)   LMP 10/23/2015   SpO2 95%   BMI 36.94 kg/m²     Last documented pain score (0-10 scale): Pain Level: 7  Last Weight:   Wt Readings from Last 1 Encounters:   11/04/21 189 lb 2.5 oz (85.8 kg)     Mental Status:  oriented and alert    IV Access:  - Central Venous Catheter  - site  right and subclavian, insertion date: 10/05/2021     Nursing Mobility/ADLs:  Walking   Assisted  Transfer  Dependent  Bathing  Assisted  Dressing  Assisted  Toileting  Assisted  Feeding  Independent  Med Admin  Independent  Med Delivery   whole    Wound Care Documentation and Therapy:  Negative Pressure Wound Therapy Foot Right (Active)   Wound Type Surgical 10/16/21 1553   Dressing Type Black foam 10/24/21 0202   Target Pressure (mmHg) 150 11/01/21 0309   Dressing Status Clean;Dry; Intact 11/01/21 0309   Dressing Changed Dressing reinforced 10/16/21 1553   Drainage Amount None 11/01/21 0309   Output (ml) 0 ml 10/30/21 0659   Wound Assessment Other (Comment) 11/01/21 0309   Number of days: 22       Wound 06/17/21 (Active)   Number of days: 140        Elimination:  Continence: Bowel: Yes  Bladder: Yes  Urinary Catheter: None   Colostomy/Ileostomy/Ileal Conduit: N/A       Date of Last BM: 11/15/2021    Intake/Output Summary (Last 24 hours) at 11/4/2021 1919  Last data filed at 11/4/2021 1752  Gross per 24 hour   Intake 1686.19 ml   Output 2700 ml   Net -1013.81 ml     I/O last 3 completed shifts: In: 1086.2 [I.V.:586.2; IV Piggyback:500]  Out: 2700 [Urine:2700]    Safety Concerns: At Risk for Falls    Impairments/Disabilities:      *** NWB BLE    Nutrition Therapy:  Current Nutrition Therapy:   - Oral Diet:  Carb Control 4 carbs/meal (1800kcals/day)    Routes of Feeding: Oral  Liquids: Thin  Daily Fluid Restriction: no  Last Modified Barium Swallow with Video (Video Swallowing Test): not done    Treatments at the Time of Hospital Discharge:   Respiratory Treatments:   Oxygen Therapy:  is on oxygen at 1 L/min per nasal cannula. Ventilator:    - No ventilator support    Heart Failure Instructions for Daily Management  Patient was treated for chronic diastolic heart failure. she  will require the following:    Please weigh daily on the same scale and approximately the same time of day. Report weight gain of 3 pounds/day or 5 pounds/week to : alec GOMEZ and Jo Mireles [phone]. Please use hospital discharge weight as baseline reference. Please monitor for signs and symptoms of and report to MD:  Worsening Heart Failure: sudden weight gain, shortness of breath, lower extremity or general edema/swelling, abdominal bloating/swelling, inability to lie flat, intolerance to usual activity, or cough (especially at night). Report these finding even if no increase in weight.   Dehydration:  having difficulty or a decrease in urination, Monitor BG, adjust insulin as needed. PT/OT  INFUSION ORDERS:   Vancomycin 500 mg iv daily through 12/8  Meropenem 1 gm iv q 12 through 12/8  - Diagnosis - DM foot osteomyelitis   - First dose given in hospital  - PICC   - Disposition / date discharge  - Check CBC w diff, CMP, ESR, CRP, vanco trough every Mon or Tue - FAX result to [phone]  - Call with antibiotic / infusion issues, [phone]  - Call with any change in status, transfer in or out of a facility or to hospital - [phone]  - No f/u in outpatient ID office necessary      Podiatry Wound Care Discharge Instructions  Wound VAC dressing change instructions  -Please perform wound VAC dressing change every Monday, Wednesday, Friday to the right lower extremity  -Using adhesive sheet from wound Vac kit, cut to size that will be placed over the wound and surrounding soft tissue to \"window out\" the wound  -Next, using a scissors cut the adhesive sheet out that is overlying the wound bed  -Next, using the black foam from the wound VAC kit, cut to size the black foam to fit over the wound bed  -Next, using the adhesive sheet, place the black foam over the wound bed and then place the adhesive sheet over the black foam to hold in place  -Next, using a scissors cut a quarter size hole in the adhesive sheet/black foam for the wound VAC suction connector to be placed over  -Next, place the wound VAC suction connector over the cut out area on the black foam  -Next, hook up the connector to the other portion of the wound VAC canister  -Set wound VAC to VAC therapy and running continuously for 125 mmHg   -Ensure that a good seal is noted.  If needed re-enforce areas with additional adhesive sheet stripes from the wound VAC kit  -Next, dress the area with gauze over the wound VAC area and place gauze along the top of the foot and ankle  -Next, using kerlix wrap from the toes up and just above the ankle  -Next, using Ace bandage, wrap from the toes up and just above the ankle with CARE to ensure wound VAC tubing is NOT in direct contact with the skin    Please perform every day dressing changes to left lower extremity as follows  -Apply saline moistened gauze to the wound on the bottom of the left foot near the fourth digit  -Next apply dry gauze over the moistened gauze  -Next apply gauze to the top of the left foot, ankle, and leg  -Next loosely wrap the left lower extremity with Kerlix starting from just in front of the toes and ending just below the knee  -Next gently wrap the left foot with 4 inch Ace bandage starting from just in front of the toes and ending just above the ankle  -Next gently wrap the left leg with 6 inch Ace bandage starting from just above the ankle and ending just below the right knee  Patient is nonweightbearing Bilateral lower extremity  Please follow-up with Dr. Paras Lugo DPM for wound recheck within 1 week at [address] [place] [address]      Physician Certification: I certify the above information and transfer of Seth Melendez  is necessary for the continuing treatment of the diagnosis listed and that she requires [facility] for greater 30 days.      Update Admission H&P: No change in H&P    PHYSICIAN SIGNATURE:  Electronically signed by Bradley Marie MD on 11/11/21 at 10:26 AM EST

## 2021-11-04 NOTE — CARE COORDINATION
Case Management Assessment           Initial Evaluation                Date / Time of Evaluation: 11/4/2021 1:59 PM                 Assessment Completed by: SAURABH Jha, RICHELLEW    Patient Name: Taiwo Haynes     YOB: 1965  Diagnosis: Acute encephalopathy [G93.40]  Acute hypoxemic respiratory failure ([facility] [J96.01]     Date / Time: 11/3/2021  2:22 PM    Patient Admission Status: Inpatient    If patient is discharged prior to next notation, then this note serves as note for discharge by case management. Current PCP: Radha Delgado MD  Clinic Patient: No    Chart Reviewed: Yes  Patient/ Family Interviewed: Yes     Initial assessment completed at bedside with:  Pt unable at this time    Hospitalization in the last 30 days: Yes    Emergency Contacts:  Extended Emergency Contact Information  Primary Emergency Contact: DANAE [facility] [address] [facility] Phone: [phone]  Relation: Parent  Secondary Emergency Contact: Anne-Marie Lindafaustina [address] Phone: [phone]  Relation: Child    Advance Directives:   Code Status: [address]: No    Financial  Payor: Abilio Isidro / Plan: Abilio Isidro / Product Type: *No Product type* /     Pre-cert required for SNF: Yes    Pharmacy    [address] [place] [phone]  [facility] [address]  Phone: [phone] Fax: [phone]      Potential assistance Purchasing Medications:    Does Patient want to participate in local refill/ meds to beds program?:      Meds To Beds General Rules:  1. Can ONLY be done Monday- Friday between 8:30am-5pm  2. Prescription(s) must be in pharmacy by 3pm to be filled same day  3. Copy of patient's insurance/ prescription drug card and patient face sheet must be sent along with the prescription(s)  4.  Cost of Rx cannot be added to hospital bill. If financial assistance is needed, please contact unit  or ;  or  CANNOT provide pharmacy voucher for patients co-pays  5. Patients can then  the prescription on their way out of the hospital at discharge, or pharmacy can deliver to the bedside if staff is available. (payment due at time of pick-up or delivery - cash, check, or card accepted)     Able to afford home medications/ co-pay costs: Yes    ADLS  Support Systems:      PT AM-PAC:   /24  OT AM-PAC:   /24    [place]: home with 2 adult children (was currently at SNF)  Steps: 6 outside    Plans to RETURN to current housing: Yes  Barriers to RETURNING to current housing: safety concerns    [address]  Currently ACTIVE with [address]: No  Home Care Agency: Not Applicable    Currently ACTIVE with [place] on Aging: No      Durable Medical Equipment  DME Provider: unknown  Equipment: walker    Home Oxygen and Respiratory Equipment  Has HOME OXYGEN prior to admission: No    DISCHARGE PLAN:  Disposition:  tbd    Transportation PLAN for discharge: tbd     Factors facilitating achievement of predicted outcomes: Family support    Barriers to discharge: Medical complications    Additional Case Management Notes: CM attempted to visit with pt twice but pt very drowsy, unable to awaken. [facility]. CM spoke with Armando Johnson from [facility] who reported that pt is not able to return at DC due to recent drug use. CM sent referrals to the following SNF's:     -[place] [id number] [phone]    CM will continue to follow for DC needs and recs.         The Plan for Transition of Care is related to the following treatment goals of Acute encephalopathy [G93.40]  Acute hypoxemic respiratory failure ([facility] [J96.01]    The Patient and/or patient representative Horace Daniel and her family were provided with a choice of provider and agrees with the discharge plan Yes    Freedom of choice list was provided with basic dialogue that supports the patient's individualized plan of care/goals and shares the quality data associated with the providers.  Yes    Care Transition patient: No    SAURABH [place], [facility]  Case Management Department  Ph: [phone]

## 2021-11-04 NOTE — CONSULTS
Clinical Pharmacy Progress Note  Medication History     Admit Date: 11/3/2021    Subjective/Objective:  Asked by Dr. New Griffin to clarify patient's home medications. List of current medications patient is taking is complete. Home medication list in EPIC updated to reflect changes noted below. Source of information: [facility] medication list    Changes made to medication list:   Medications removed:  · Gabapentin 200 mg BID (discontinued by SNF)  · Torsemide 40 mg daily  · Gauze pads    Medications added:   · Acetaminophen 650 mg q4h PRN pain/fever  · Pepto Bismol 30 mL q3-4h PRN diarrhea/indigestion  · Furosemide 40 mg BID  · Guaifenesin-dextromethorphan 10 mL q4h PRN cough  · Milk of magnesia 30 mL daily PRN constipation  · Multivitamin 1 tab daily  · Oxycodone 5mg q8h PRN pain    Medication doses adjusted:   · Atorvastatin 20 mg - changed to 40 mg nightly      Please note: Patient was on methadone prior to her last admission, and per chart review, it appears that she was taking 140 mg daily. She received varying doses from  mg during her most recent hospital admission, and it was discontinued upon discharge on 11/1 due to somnolence. Attempted to call methadone clinic ([facility] phone #[phone]) to verify dose and last dispense, but was unable to reach anyone. Left a message and will attempt to contact them again tomorrow. Please call with any questions.     Lory Herrmann, [place], [facility]  Wireless: [phone]  11/4/2021 1:21 PM

## 2021-11-04 NOTE — PLAN OF CARE
Problem: Falls - Risk of:  Goal: Will remain free from falls  Description: Will remain free from falls  11/4/2021 0934 by Herb Suazo RN  Outcome: Ongoing      Goal: Absence of physical injury  Description: Absence of physical injury  Outcome: Ongoing

## 2021-11-04 NOTE — PROGRESS NOTES
ICU Progress Note    Admit Date: 11/3/2021  Day: 2  Vent Day: 0  IV Access:Peripheral  IV Fluids: NS, heparin   Vasopressors:None                Antibiotics: None  Diet: ADULT DIET; Regular; 4 carb choices (60 gm/meal); Low Sodium (2 gm)    CC: Unresponsiveness    Interval history: Patient afebrile in the last 24 hours, patient BP controlled. Patient complained of some pain yesterday and overnight received 4 doses of Dilaudid 0.25 mg. Today the patient reports she feels better, no tremors. Oxygen requirements went up to  Vapotherm 20 L. I/Os 1400. UO 0.7 mL/kr/hr. Cr went up 2.5. Methadone clinic could not be contacted for a plan for discharge. Patient denies shortness of breath, chest pain, chills, nausea, vomiting She denies urinary frequency, dysuria. Patient is hemodynamically stable and afebrile She remains on Heparin drip       HPI: Seth Melendez is a 60 y.o. female with PMH as below notable for chronic osteomyelitis (on [place]ills), CKD 4, DVT, type 2 diabetes mellitus complicated by lower extremity ulcers, HTN, chronic pain on methadone who presented from a nursing facility due to altered mental status, per report the patient was lethargic and did not respond to sternal rub, patient became combative after receiving 2 doses of Narcan. Patient has been on methadone for 20 years. She is seen at [facility] (reached at [phone]). On previous admission 10/23, there were concerns that patient somnolence was caused by methadone and the decision was made to discontinue it with recommendation that she will follow-up at methadone clinic. She was discharged on oxycodone 5 mg for 2 days. On admission patient was combative and aggressive when suddenly became apneic and cyanotic. Patient went into respiratory arrest, lost pulse. CPR was a started, patient was bagged and Narcan was given, after that patient started breathing again and once again combative.   Patient was intubated for airway protection. Per ED report patient vomited several times and there was a concern for aspiration. Patient was admitted to the ICU for further workup and management of acute respiratory failure 2/2 AMS. Medications:     Scheduled Meds:   insulin glargine  10 Units SubCUTAneous Nightly    insulin lispro  0-18 Units SubCUTAneous TID WC    insulin lispro  0-9 Units SubCUTAneous Nightly    furosemide  60 mg IntraVENous BID    sodium chloride flush  5-40 mL IntraVENous 2 times per day    vancomycin  750 mg IntraVENous Q24H    meropenem  1,000 mg IntraVENous Q12H    metoprolol succinate  50 mg Oral Daily     Continuous Infusions:   dextrose      sodium chloride      heparin (PORCINE) Infusion 15 Units/kg/hr (11/05/21 0400)     PRN Meds:HYDROmorphone, glucose, dextrose, glucagon (rDNA), dextrose, sodium chloride flush, sodium chloride, ondansetron **OR** ondansetron, polyethylene glycol, acetaminophen **OR** acetaminophen, heparin (porcine), heparin (porcine), labetalol, hydrOXYzine, promethazine    Objective:   Vitals:   T-max:  Patient Vitals for the past 8 hrs:   BP Temp Temp src Pulse Resp SpO2   11/05/21 0600 (!) 122/55   78 12 100 %   11/05/21 0500 (!) 125/50   75 15 100 %   11/05/21 0400 (!) 118/47 98.5 °F (36.9 °C) Oral 81 16 100 %   11/05/21 0300 (!) 127/55   75 17 98 %   11/05/21 0200 (!) 118/51   73 17 97 %   11/05/21 0100 (!) 118/52   77 15 98 %       Intake/Output Summary (Last 24 hours) at 11/5/2021 2086  Last data filed at 11/5/2021 0400  Gross per 24 hour   Intake 1719.26 ml   Output 1000 ml   Net 719.26 ml       Review of Systems  A 10 point review of systems was conducted, significant findings as noted in HPI. Physical Exam  Constitutional:       Appearance: Normal appearance. HENT:      Head: Normocephalic and atraumatic. Mouth/Throat:      Mouth: Mucous membranes are moist.   Eyes:      Extraocular Movements: Extraocular movements intact.       Conjunctiva/sclera: Conjunctivae normal.      Pupils: Pupils are equal, round, and reactive to light. Cardiovascular:      Rate and Rhythm: Normal rate and regular rhythm. Pulses: Normal pulses. Heart sounds: Normal heart sounds. Pulmonary:      Effort: Pulmonary effort is normal.      Breath sounds: Rhonchi (diffuse bilateral) present. Abdominal:      General: Abdomen is flat. Bowel sounds are normal.      Palpations: Abdomen is soft. Musculoskeletal:         General: Normal range of motion. Cervical back: Normal range of motion. Right lower le+ Edema present. Left lower le+ Edema present. Neurological:      General: No focal deficit present. Mental Status: She is alert and oriented to person, place, and time. Mental status is at baseline. Psychiatric:         Mood and Affect: Mood normal.         Behavior: Behavior normal.         LABS:    CBC:   Recent Labs     21   WBC 10.8 16.4* 12.1*   HGB 10.2* 8.3* 7.5*   HCT 31.2* 25.2* 22.9*    419 371   MCV 86.9 86.0 87.0     Renal:    Recent Labs     21    139 135*   K 4.7 4.8 4.6   CL 99 102 99   CO2 23 27 28   BUN 51* 48* 55*   CREATININE 2.0* 2.0* 2.5*   GLUCOSE 200* 208* 272*   CALCIUM 9.4 8.3 8.6   MG  --  1.70* 2.40   PHOS  --  4.9 5.0*   ANIONGAP 15 10 8     Hepatic:   Recent Labs     21   AST 90*  --   --    ALT 48*  --   --    BILITOT <0.2  --   --    PROT 7.3  --   --    LABALBU 2.7* 2.0* 2.1*   ALKPHOS 348*  --   --      Troponin:   Recent Labs     21  14421   TROPONINI 0.04* 0.06* 0.06*     BNP: No results for input(s): BNP in the last 72 hours. Lipids: No results for input(s): CHOL, HDL in the last 72 hours.     Invalid input(s): LDLCALCU, TRIGLYCERIDE  ABGs:  No results for input(s): PHART, KDM7FUM, PO2ART, NNK2RQF, BEART, THGBART, W6MOYOAI, KKG8OKP in the last 72 hours. INR:   Recent Labs     11/03/21 2000   INR 1.30*     Lactate: No results for input(s): LACTATE in the last 72 hours. Cultures:  -----------------------------------------------------------------  RAD:   XR CHEST PORTABLE   Final Result   Impression: Interval extubation. Stable appearance of the lungs. CT Head WO Contrast   Final Result      No acute intracranial pathology                 XR CHEST PORTABLE   Final Result   1. Endotracheal tube tip just directed at the right mainstem bronchus. Catheter can be withdrawn 1.5 to 2 cm. 2. Multifocal airspace disease      Critical finding reported to  Physician: Erin Guerrero  at 3:18 PM on 11/3/2021, and he confirmed that the tube has been repositioned after review of the above radiograph       XR CHEST PORTABLE    (Results Pending)         Assessment/Plan:   Trina Cleveland is a 60 y.o. female with PMH as below notable for chronic osteomyelitis (on [place]), CKD 4, DVT, type 2 diabetes mellitus complicated by lower extremity ulcers, HTN, chronic pain on methadone who presented from a nursing facility due to altered mental status. Patient was admitted to the ICU for further workup and management of acute respiratory failure 2/2 AMS. Neuro/Sedation:   Patient is alert and oriented  Opioid withdrawal   Patient was found unresponsive, regained consciousness after Narcan administration. CT head was negative for acute intracranial abnormality. Patient using methadone for 20 years and previous methadone clinic visit there was concern of somnolence secondary to methadone. -COWS protocol   -Dilaudid prn     CV:   Hypertensive and afebrile   SBP between 160-200s   STEMI 2/2 likely secondary to demand ischemia (resolved)  Patient asymptomatic. On admission troponin was elevated at 0.04, went up to 0.06, now stable. EKG showed no ischemic changes. HTN   Patient has been hypertensive since admission. -130s.    -Metoprolol 50 OD  -Lasix 60 mg BID IV     Chronic DVTs history  Patient was on Eliquis at home.  -Continue heparin drip    Respiratory:    SpO2 >90% on Vapotherm 20L  Acute hypoxic respiratory failure 2/2 likely narcotic overdose  Patient stopped breathing during admission, lost pulse, CPR was initiated and patient restarted breathing after Narcan was given. Patient was intubated for airway protection. Patient was extubated. With increased oxygen requirements. CXR showed unchanged multifocal airspace disease. Oxygen requirements went up to 30 L. No fevers in the last 24 hours. WBC 12.  -Continue Lasix 60 mg BID IV  -Hold narcotics   -Consider CXR  -Follow-up respiratory cultures    Possible RASHMI  Patient with complains of somnolence, BMI 39.  -Recommend outpatient studies    ID:  · Patient afebrile in the last 24 hours  Osteomyelitis  Wound culture grew polymicrobial with Pseudomonas aeruginosa (resistant to ciprofloxacin), E. coli, E faecalis. seen by ID previously, IV antibiotics Vanco and meropenem through 11/26. WBC 16.   -Continue Merrem  -Consider holding Vanc  -Follow-up ID recs  -Follow-up podiatry recs    Renal:   Voiding spontaneous  · I/Os: 1400/ 24 hours  FAHEEM on CKD stage 4  Likely 2/2 pre-renal. Patient had a code con admission. Creatinine baseline Cr 1.4-1.7. Patient has been hypertensive likely due to agitation. Ur FENA 3.1. Patient was on Lasix. -F/u renal studies  -Control BP  - Avoid nephrotoxic agents  - Strict I/Os    GI:  ADULT DIET; Regular; 4 carb choices (60 gm/meal); Low Sodium (2 gm)    Endocrine:  T2DM  Last A1c 8.3 - 10/1/21  -Continue Lantus 14 u  -HDSS  -hypoglicemia protocol   -f/u BG levels    Hematology/Oncology:  AOCD  -Follow-up CBCs      Code Status: Full Code  FEN: ADULT DIET; Regular; 4 carb choices (60 gm/meal);  Low Sodium (2 gm)  PPX: Heparin sc  DISPO: ICU    Jesi Love MD, PGY-1  11/05/21  8:22 AM    This patient will be staffed and discussed with Jarek Jaimes, MD.    Patient was seen, examined and discussed with Dr. Laure Carias. I agree with the interval history. My physical exam confirms the findings listed below  Chart was reviewed including labs and medical records confirm the findings noted     Acute respiratory failure extubated on 11/4  Pulmonary edema: CXR with bilateral airspace disease improved from prior.  Pro BNP remain elevated but lower than baseline.  Troponin is mildly elevated. On O2 at 20 lpm.  She was on 3 liters on prior admission   Lactic acidemia - resolved   FAHEEM:  Creatinine is up to 2.5. UOP 1.4 mL over the past 24 hours. Mild leukocytosis at 12.1  Osteomyelitis      Hypoxia is improving, down to 8 liters O2.     Continue lasix   Vanc and merrem per ID  Wean down O2 supplement to keep sats >=90  Keep off methadone   Oxycodone 5mg Q4h PRN

## 2021-11-04 NOTE — PROGRESS NOTES
Hospitalist Progress Note      PCP: Wilfredo Echeverria MD    Date of Admission: 11/3/2021    Chief Complaint: Flora Young, unresponsive at nursing home    Hospital Course:   61 y/o F w/ hx if RASHMI, COPD with baseline O2 1-3A, systolic HF, DVT (8940), bilateral lower foot wounds w/ partial amputations, osteomyelitis on vancomycin and meropenem, CKD, DM   Recent admissions  -- 10/01 - 10/16 - progressively worsening b/l lower extremity pain for 2 weeks, OR on 10/7 for bilateral lower extremity I&D with left fifth ray resection, right fourth ray resection, right partial cuboid resection. Surgical path from 10/7 overall showed partially treated osteomyelitis. Patient went back to the OR 10/13 for for repeat I&D with delayed primary closure of right lower extremity and I&D with partial cuboid resection and wound vac application. Patient was hypotensive and difficult to arouse after surgery. on BIPAP with improvement in mental status. Recommended to drop dose of Methadone as likely contributing to her encephalopathy  -- 10/18 - 10/20 --  minimally responsive shortly after receiving her typical 140 mg of methadone in the morning. On arrival of the EMS she was given Narcan with immediate improvement in mental status, she was also noted to have glucose in the 30s and was given glucagon with some improvement. noted to have hypoxia and was placed on nonrebreather oxygen as well as given Haldol and Versed for agitation. Insulin doses were adjusted and dc to facility  -- 10/23 - 11/01 -- due to waxing and wanning mental status. Diuresed with Lasix net -5 L, and discharged on torsemide and stopped methadone  - 11/03 --- lethargic at nursing home, not responding to sternal rub, after receiving 2 doses of narcan, intubated in the ED but extubated once in ICU where requiring NRB    Subjective: On nonrebreather, says she is not sure what happened at the nursing home, denies use of methadone, denies chest pain.   Complains of shortness of breath. Medications:  Reviewed    Infusion Medications    dextrose      sodium chloride      heparin (PORCINE) Infusion 15 Units/kg/hr (11/04/21 1016)     Scheduled Medications    insulin glargine  7 Units SubCUTAneous Nightly    furosemide  60 mg IntraVENous BID    insulin lispro  0-12 Units SubCUTAneous TID WC    insulin lispro  0-6 Units SubCUTAneous Nightly    sodium chloride flush  5-40 mL IntraVENous 2 times per day    vancomycin  750 mg IntraVENous Q24H    meropenem  1,000 mg IntraVENous Q12H    metoprolol succinate  50 mg Oral Daily     PRN Meds: HYDROmorphone, glucose, dextrose, glucagon (rDNA), dextrose, sodium chloride flush, sodium chloride, ondansetron **OR** ondansetron, polyethylene glycol, acetaminophen **OR** acetaminophen, heparin (porcine), heparin (porcine), labetalol, hydrOXYzine, promethazine      Intake/Output Summary (Last 24 hours) at 11/4/2021 1431  Last data filed at 11/4/2021 1230  Gross per 24 hour   Intake 1086.19 ml   Output 2450 ml   Net -1363.81 ml       Physical Exam Performed:    BP (!) 112/52   Pulse 73   Temp 99.2 °F (37.3 °C) (Oral)   Resp 17   Wt 189 lb 2.5 oz (85.8 kg)   LMP 10/23/2015   SpO2 93%   BMI 36.94 kg/m²   Constitutional:       Appearance: Chronically ill appearing  HENT:      Head: Normocephalic and atraumatic. Eyes:      Extraocular Movements: Extraocular movements intact.      Conjunctiva/sclera: Conjunctivae normal.   Cardiovascular:      Rate and Rhythm: Normal rate and regular rhythm. Pulmonary:   Bilateral rales present  Abdominal:   No tenderness, soft, BS +ve  Musculoskeletal:         General: Normal range of motion.      Cervical back: Normal range of motion.      Comments: BLEs wrapped in ace bandages  Skin:     General: Skin is warm and dry. Neurological:      General: No focal deficit present.      Mental Status: She is alert.    Psychiatric:         Mood and Affect: Mood normal.     Labs:   Recent Labs 11/03/21  1444 11/03/21 2000 11/04/21  0400   WBC 12.3* 10.8 16.4*   HGB 10.0* 10.2* 8.3*   HCT 30.3* 31.2* 25.2*   * 414 419     Recent Labs     11/03/21  1444 11/04/21  0400    139   K 4.7 4.8   CL 99 102   CO2 23 27   BUN 51* 48*   CREATININE 2.0* 2.0*   CALCIUM 9.4 8.3   PHOS  --  4.9     Recent Labs     11/03/21  1444   AST 90*   ALT 48*   BILITOT <0.2   ALKPHOS 348*     Recent Labs     11/03/21 2000   INR 1.30*     Recent Labs     11/03/21  1444 11/03/21 2000 11/03/21  2352   TROPONINI 0.04* 0.06* 0.06*       Urinalysis:      Lab Results   Component Value Date    NITRU Negative 10/23/2021    WBCUA None seen 10/23/2021    BACTERIA Rare 10/23/2021    RBCUA 0-2 10/23/2021    BLOODU Negative 10/23/2021    SPECGRAV 1.020 10/23/2021    GLUCOSEU 100 10/23/2021       Radiology:  XR CHEST PORTABLE   Final Result   Impression: Interval extubation. Stable appearance of the lungs. CT Head WO Contrast   Final Result      No acute intracranial pathology                 XR CHEST PORTABLE   Final Result   1. Endotracheal tube tip just directed at the right mainstem bronchus. Catheter can be withdrawn 1.5 to 2 cm.    2. Multifocal airspace disease      Critical finding reported to  Physician: Erin Guerrero  at 3:18 PM on 11/3/2021, and he confirmed that the tube has been repositioned after review of the above radiograph               Assessment/Plan:    Active Hospital Problems    Diagnosis Date Noted    Acute encephalopathy [G93.40] 10/18/2021    Type 2 diabetes mellitus with [facility] diabetic foot [facility] [T57.956, L08.9]     Type 2 diabetes mellitus with right diabetic foot infection ([facility] [U97.596, L08.9] 03/12/2021    Chronic osteomyelitis of right foot with draining sinus [facility]) [M86.471] 06/03/2019    Diabetic polyneuropathy associated with type 2 diabetes mellitus ([facility] [E11.42] 03/26/2019    Open wound of right foot [S91.301A] 07/03/2018     Acute respiratory failure with hypoxemia, intubated in the emergency room, extubated once was in ICU to NRB  Still needing nonrebreather to maintain O2 sats  She has diffuse crackles on exam, I suspect this is due to aspiration, she does not look grossly overloaded otherwise in exam.  Her baseline oxygen is 3-4 L NC  Continue antibiotics broad-spectrum vancomycin and Merrem  Continue diuretics    Acute encephalopathy toxic due to narcotic overdose, improved with Narcan use.   She was asked to stop methadone on discharge on 11/01  Unsure if she is to take methadone, patient denies  Avoid narcotics    NSTEMI type II due to demand ischemia, EKG trend is flat, no EKG changes critical ischemia or infarction    Chronic DVTs, continue heparin drip, transition to apixaban when stable    Osteomyelitis, chronic antibiotics, high fever on admission, ID following, continue broad-spectrum antibiotics  Podiatry following, for evaluation of lower extremity  Reviewed podiatry note     FAHEEM on CKD stage IV, on discharge creatinine was 1.6 (11/01)  Continue diuresis, monitor renal profile daily    Current diastolic congestive heart failure improved proBNP actually lower than discharge on 11/1  Continue diuresis    Type 2 diabetes, continue sliding scale insulin, hypoglycemia protocol, carb controlled diet    Consider GI prophylaxis with Pepcid twice daily  DVT Prophylaxis: On heparin drip  Diet: Diet NPO  Code Status: Full Code    PT/OT Eval Status: Order once acute issues resolved    Dispo -continue inpatient care in the intensive care unit once oxygen requirements are down can transition to progressive care unit    Blayne Birmingham MD  Hospitalist

## 2021-11-04 NOTE — CONSULTS
Department of Podiatry Consult Note  Resident       Reason for Consult: Diabetic foot ulcerations  Requesting Physician: Bekah Moreno MD    CHIEF COMPLAINT: Foot wounds    HISTORY OF PRESENT ILLNESS:                The patient is a 60 y.o. female with significant past medical history as listed below. Podiatry was consulted for diabetic foot ulcerations. Patient is well-known to podiatry service and follows with the [facility] podiatry clinic with Dr. Blanca Sarabia. Patient is s/p multiple I&D's bilateral foot with latest surgery including with wound VAC application to the right foot on 10/13/2021. She was previously admitted on 10/24/2021 for acute encephalopathy and discharged on 11/1/2021 to nursing facility. She was placed on prolonged course of IV antibiotics of vancomycin and meropenem through 11/26/2021 by Dr. Serenity Moeller of infectious diseases. Patient was brought to the emergency department yesterday from her facility after being unresponsive at the facility. In the [facility] emergency department, she briefly lost pulses and CPR was started. After Narcan was given, she began breathing again and was subsequently intubated for airway protection. This morning, patient is extubated and calm. History limited as patient is lethargic.      Past Medical History:        Diagnosis Date    Asthma 05/14/2004    Bacterial vaginosis 04/2008    Carpal tunnel syndrome 05/2007    COPD (chronic obstructive pulmonary disease) ([address]     Diabetes mellitus type II 08/2007    10/1/20 pt states does accucheck 2x/day at home    Diabetic neuropathy ([address] 86/6897    Diastolic CHF ([address]     DVT (deep venous thrombosis) ([address] 03/2004    Dyslipidemia 05/2009    Dyspareunia 05/2009    ETOH abuse 03/04/2007    HTN (hypertension)     Hx of blood clots     Hyperlipidemia     MRSA (methicillin resistant staph aureus) culture positive 11/06/2017; 11/17/2017    foot; leg     Neuropathy 05/2009    polyneuropathy    application    NH DEBRIDEMENT, SKIN, SUB-Q TISSUE,MUSCLE,BONE,=<20 SQ CM Right 8/17/2018    RIGHT FOOT DEBRIDEMENT INCISION AND DRAINAGE, PARTIAL 5TH RAY AMPUTATION performed by Leticia Murray DPM at [address] [facility] / [address]  7/7003    cryotherapy done on lesion    TOE AMPUTATION Left 02/24/2017    AMPUTATION LEFT GREAT TOE                 TONSILLECTOMY         Allergies:   Sulfa antibiotics    Medications:   Home Meds  No current facility-administered medications on file prior to encounter. Current Outpatient Medications on File Prior to Encounter   Medication Sig Dispense Refill    gabapentin (NEURONTIN) 100 MG capsule Take 2 capsules by mouth 2 times daily for 30 days.  120 capsule 0    torsemide (DEMADEX) 20 MG tablet Take 2 tablet by mouth daily 30 tablet 0    insulin glargine (LANTUS SOLOSTAR) 100 UNIT/ML injection pen Inject 7 Units into the skin nightly 5 pen 0    glucagon 1 MG injection Inject 1 kit into the muscle as needed (low blood sugar)      aspirin 81 MG EC tablet Take 81 mg by mouth daily      omeprazole (PRILOSEC) 20 MG delayed release capsule Take 20 mg by mouth daily      ammonium lactate (LAC-HYDRIN) 12 % lotion Apply topically daily Apply to both legs daily      insulin aspart (NOVOLOG FLEXPEN) 100 UNIT/ML injection pen Inject 8 Units into the skin 3 times daily (before meals)      vancomycin (VANCOCIN) 750 MG injection Infuse 750 mg intravenously daily      meropenem (MERREM) 1 g injection Inject 1,000 mg into the muscle every 12 hours      hydrALAZINE (APRESOLINE) 50 MG tablet Take 1 tablet by mouth every 8 hours 90 tablet 3    metoprolol succinate (TOPROL XL) 50 MG extended release tablet Take 1 tablet by mouth daily 30 tablet 3    ELIQUIS 5 MG TABS tablet TAKE 1 TABLET BY MOUTH TWICE DAILY 180 tablet 1    escitalopram (LEXAPRO) 10 MG tablet Take 1 tablet by mouth daily 30 tablet 2    amitriptyline (ELAVIL) 100 MG tablet TAKE 1 TABLET BY MOUTH EVERY NIGHT AT BEDTIME 30 tablet 2    atorvastatin (LIPITOR) 20 MG tablet Take 1 tablet by mouth nightly 90 tablet 1    nystatin (MYCOSTATIN) 275150 UNIT/GM powder Apply topically 2 times daily Apply to right breast and groin area BID 30 g 3    blood glucose test strips (TRUE METRIX BLOOD GLUCOSE TEST) strip 1 each by In Vitro route 2 times daily As needed.  200 each 5    Insulin Pen Needle 31G X 5 MM MISC 1 each by Does not apply route daily 100 each 5    Lancets MISC 1 each by Does not apply route 2 times daily PHARMACY MAY SUBSTITUTE TO TRUE METRIX LANCETS 100 each 5    Gauze Pads & Dressings MISC Please dispense 4x8 guaze, kerlix, and ace 1 each 2       Current Meds  magnesium sulfate 2000 mg in 50 mL IVPB premix, Once  insulin glargine (LANTUS;BASAGLAR) injection pen 7 Units, Nightly  insulin lispro (1 Unit Dial) 0-12 Units, TID WC  insulin lispro (1 Unit Dial) 0-6 Units, Nightly  glucose (GLUTOSE) 40 % oral gel 15 g, PRN  dextrose 50 % IV solution, PRN  glucagon (rDNA) injection 1 mg, PRN  dextrose 5 % solution, PRN  sodium chloride flush 0.9 % injection 5-40 mL, 2 times per day  sodium chloride flush 0.9 % injection 5-40 mL, PRN  0.9 % sodium chloride infusion, PRN  ondansetron (ZOFRAN-ODT) disintegrating tablet 4 mg, Q8H PRN   Or  ondansetron (ZOFRAN) injection 4 mg, Q6H PRN  polyethylene glycol (GLYCOLAX) packet 17 g, Daily PRN  acetaminophen (TYLENOL) tablet 650 mg, Q6H PRN   Or  acetaminophen (TYLENOL) suppository 650 mg, Q6H PRN  heparin (porcine) injection 7,290 Units, PRN  heparin (porcine) injection 3,640 Units, PRN  heparin 25,000 units in dextrose 5% 250 mL (premix) infusion, Continuous  labetalol (NORMODYNE;TRANDATE) injection 10 mg, Q4H PRN  vancomycin (VANCOCIN) 750 mg in dextrose 5 % 250 mL IVPB, Q24H  meropenem (MERREM) 1,000 mg in sodium chloride 0.9 % 100 mL IVPB (mini-bag), Q12H  hydrOXYzine (VISTARIL) capsule 50 mg, Q8H PRN  promethazine (PHENERGAN) tablet 25 mg, Q6H PRN  torsemide (DEMADEX) tablet 40 mg, BID  metoprolol succinate (TOPROL XL) extended release tablet 50 mg, Daily        Family History:   History reviewed. No pertinent family history. Social History:   TOBACCO:   reports that she quit smoking about 3 years ago. Her smoking use included cigarettes. She has a 30.00 pack-year smoking history. She has never used smokeless tobacco.  ETOH:   reports no history of alcohol use. DRUGS:   reports no history of drug use. REVIEW OF SYSTEMS:    Limited 2/2 clinical condition. PHYSICAL EXAM:      Vitals:    BP (!) 128/49   Pulse 80   Temp 99.9 °F (37.7 °C) (Axillary)   Resp 18   Wt 189 lb 2.5 oz (85.8 kg)   LMP 10/23/2015   SpO2 95%   BMI 36.94 kg/m²     LABS:   Recent Labs     11/03/21 2000 11/04/21  0400   WBC 10.8 16.4*   HGB 10.2* 8.3*   HCT 31.2* 25.2*    419     Recent Labs     11/04/21  0400      K 4.8      CO2 27   PHOS 4.9   BUN 48*   CREATININE 2.0*     Recent Labs     11/03/21  1444 11/03/21 2000 11/03/21  2352   PROT 7.3  --   --    INR  --  1.30*  --    APTT  --  33.4 163.8*         VASCULAR: DP and PT pulses nonpalpable. Upon hand-held Doppler examination, biphasic signals noted to DP and PT bilateral.  CFT is brisk to the digits of the foot b/l. Skin temperature is warm to cool from proximal to distal with no focal calor noted. Nonpitting edema noted bilateral lower extremity. No pain with calf compression b/l. NEUROLOGIC: Unable to be tested 2/2 clinical condition. DERMATOLOGIC: Diffuse dermatologic changes noted bilateral lower extremity. Right lower extremity:  Surgical incision noted to the lateral aspect distal to the wound with skin edges well reapproximated and sutures intact. No signs of dehiscence noted. No fluctuance, crepitus, erythema, drainage, or malodor noted.           Full-thickness ulceration noted to the plantar aspect of the cuboid with mixture of granular and fibrotic base and slightly macerated rim. Measures 4.7 cm x 2.9 cm x 0.8 cm. No fluctuance, crepitus, erythema, malodor, or drainage noted. Wound does not probe to bone, tunnel, or track. Left lower extremity:  Surgical incision noted to the lateral aspect of the left foot with skin edges well reapproximated and sutures intact. Diffuse xerosis noted periincisional.  Mild areas of dehiscence noted towards the distal aspect of the incision. No fluctuance, crepitus, erythema, drainage, or malodor noted. Full-thickness ulceration noted to the plantar aspect of the fourth metatarsal head with fibrotic base and macerated rim. Measures 2.4 cm x 2.1 cm x 0.1 cm. No fluctuance, crepitus, erythema, malodor, or drainage noted. Wound does not probe to bone, tunnel, or track. MUSCULOSKELETAL: Muscle strength unable to be tested 2/2 clinical condition. No pain with palpation of the foot or ankle b/l. Ankle joint ROM is decreased in dorsiflexion with the knee extended. History of hallux amputation of fifth ray resection left foot. History of transmetatarsal amputation with fourth and fifth ray resections right foot. IMPRESSION/RECOMMENDATIONS:      -S/p I&D, partial cuboid resection of right foot, I&D with delayed primary closure of left foot 10/13/2021  -Diabetic foot ulceration, right foot, Carpenter 3  -Diabetic foot ulceration, left foot, Carpenter 1  -Peripheral vascular disease, bilateral lower extremity  -Edema, bilateral lower extremity  -Type 2 diabetes mellitus with peripheral neuropathy  -History of osteomyelitis, bilateral lower extremity  -History of multiple pedal amputations, bilateral lower extremity  -History of noncompliance with weightbearing status    -Patient examined and evaluated at the bedside   -Hypotensive, otherwise VSS on nonrebreather mask. Leukocytosis noted (WBC 16.4).   -Hemoglobin A1c 8.3%  -No imaging obtained 2/2 low concern of foot infection and well known chronic osteomyelitis from prior imaging.  -Using sterile technique, all sutures removed from bilateral foot without incident.  -Utilizing #10 blade, excisional debridement down to and including muscle right foot wound to healthy bleeding tissues. Less than 3 cc bleeding noted. Hemostasis achieved with direct pressure. Patient tolerated procedure well.  -Utilize #10 blade, excisional debridement down to and including subcutaneous tissue left foot wound to healthy bleeding tissue margins. Less than 2 cc bleeding noted. Hemostasis achieved with direct pressure. Patient tolerated procedure well. -Dressing applied to left lower extremity consisting of wet-to-dry, gauze, Kerlix, Ace bandage  -Dressing applied to the right lower extremity consisting of wet-to-dry, gauze, Kerlix, Ace bandage  -We will plan for wound VAC application to right lower extremity tomorrow  -Continue antibiotics as previously prescribed per the recommendations of Dr. Nikhil Mendoza, vancomycin and meropenem through 11/26/2021  -Prevalon boots ordered to room. To be applied to bilateral lower extremity at all times of sitting/laying.  -Nonweightbearing to bilateral lower extremity       DISPO: Diabetic foot ulceration, bilateral lower extremity; clinically stable at this time. Continue IV antibiotics per prior ID recommendations. Strict nonweightbearing to bilateral lower extremity. Will plan for wound VAC application RLE tomorrow.     - The patient will be staffed with Dr. Constantine Lindsey, RAMONE.    ALTAGRACIA Goldstein - [facility]  11/04/21  8:26 AM

## 2021-11-04 NOTE — PROGRESS NOTES
Pt placed on Non-rebreather since start of RN shift after extubation. Pt intially non-complaint with keeping mask on and desating to 70% on RA. RN explained importance of keeping mask in place for proper oxygenation. Heparin gtt also started and infusing overnight via tunneled cath. RN attempted to wean pt to high flow twice and was unsuccessful with o2 saturation 70's-80's. Pt placed back on non-rebreather. Pt febrile and hypertensive overnight: see MAR for intervention. COWS assessment initiated and IV abx infused per order. BLE leg wounds assessed and wrapped for am shift. Podiatry to see pt this morning. Pt a/o x2-3. See Doc Flow sheets for full neurological assessment. Daughters updated with pt plan of care/clinical status. Daughters made aware to not bring pt in any Methdaone or medications as the pt was calling them and requesting this. Pt resting with eyes closed at this time. RR easy and unlabored. VSS. Bed locked and in lowest position. Will continue to monitor.

## 2021-11-04 NOTE — PROGRESS NOTES
Attempt to remove non rebreather, pt 85% on 13 liters HF, weak cough, rhonchi noted bilateral lungs. Non rebreather reapplied, 95% will monitor.

## 2021-11-05 NOTE — PROGRESS NOTES
Podiatric Surgery Daily Progress Note  [place] Screen      Subjective :   Patient seen and examined this am at the bedside. Patient denies any acute overnight events. Patient denies N/V/F/C/SOB. Patient denies calf pain, thigh pain, chest pain. Review of Systems: A 12 point review of symptoms is unremarkable with the exception of the chief complaint. Patient specifically denies nausea, fever, vomiting, chills, shortness of breath, chest pain, abdominal pain, constipation or difficulty urinating. Objective     BP (!) 122/55   Pulse 78   Temp 98.5 °F (36.9 °C) (Oral)   Resp 12   Wt 189 lb 2.5 oz (85.8 kg)   LMP 10/23/2015   SpO2 100%   BMI 36.94 kg/m²      I/O:    Intake/Output Summary (Last 24 hours) at 11/5/2021 0924  Last data filed at 11/5/2021 0400  Gross per 24 hour   Intake 1719.26 ml   Output 1000 ml   Net 719.26 ml              Wt Readings from Last 3 Encounters:   11/04/21 189 lb 2.5 oz (85.8 kg)   10/30/21 200 lb 13.4 oz (91.1 kg)   10/20/21 223 lb 1.7 oz (101.2 kg)       LABS:    Recent Labs     11/04/21  0400 11/05/21  0402   WBC 16.4* 12.1*   HGB 8.3* 7.5*   HCT 25.2* 22.9*    371        Recent Labs     11/05/21  0402   *   K 4.6   CL 99   CO2 28   PHOS 5.0*   BUN 55*   CREATININE 2.5*        Recent Labs     11/03/21  1444 11/03/21 2000 11/03/21  2352 11/04/21  1559 11/05/21  0340   PROT 7.3  --   --   --   --    INR  --  1.30*  --   --   --    APTT  --  33.4   < > 77.0* 65.5*    < > = values in this interval not displayed. LOWER EXTREMITY EXAMINATION    Dressing to bilateral LE intact. No strikethrough noted to the external dressing. Scant sanguinous drainage noted to the internal layers of the dressing. VASCULAR: DP and PT pulses nonpalpable. Upon hand-held Doppler examination, biphasic signals noted to DP and PT bilateral.  CFT is brisk to the digits of the foot b/l. Skin temperature is warm to cool from proximal to distal with no focal calor noted. Nonpitting edema noted bilateral lower extremity. No pain with calf compression b/l.     NEUROLOGIC:  Gross and epicritic sensation diminished bilateral.  Protective sensation absent at all pedal sites bilateral.     DERMATOLOGIC: Diffuse dermatologic changes noted bilateral lower extremity.     Right lower extremity:  Surgical incision noted to the lateral aspect distal to the wound with skin edges coapted. No signs of dehiscence noted. No fluctuance, crepitus, erythema, drainage, or malodor noted. Full-thickness ulceration noted to the plantar aspect of the cuboid with mixture of granular and fibrotic base and slightly macerated rim. Measures 4.7 cm x 2.9 cm x 0.8 cm. No fluctuance, crepitus, erythema, malodor, or drainage noted. Wound does not probe to bone, tunnel, or track.     Left lower extremity:  Surgical incision noted to the lateral aspect of the left foot with skin edges coapted. Diffuse xerosis noted periincisional.  Mild areas of dehiscence noted towards the mid-distal aspect of the incision. No fluctuance, crepitus, erythema, drainage, or malodor noted.           Full-thickness ulceration noted to the plantar aspect of the fourth metatarsal head with fibrotic base and macerated rim. Measures 2.4 cm x 2.1 cm x 0.1 cm. No fluctuance, crepitus, erythema, malodor, or drainage noted. Wound does not probe to bone, tunnel, or track. MUSCULOSKELETAL: Muscle strength 4/5 for all pedal compartments tested. No pain with palpation of the foot or ankle b/l. Ankle joint ROM is decreased in dorsiflexion with the knee extended. History of hallux amputation of fifth ray resection left foot. History of transmetatarsal amputation with fourth and fifth ray resections right foot.     ASSESSMENT/PLAN  -S/p I&D, partial cuboid resection of right foot, I&D with delayed primary closure of left foot 10/13/2021  -Diabetic foot ulceration, right foot, Carpenter 3  -Diabetic foot ulceration, left foot, [place] 1  -Peripheral vascular disease, bilateral lower extremity  -Edema, bilateral lower extremity  -Type 2 diabetes mellitus with peripheral neuropathy  -History of osteomyelitis, bilateral lower extremity  -History of multiple pedal amputations, bilateral lower extremity  -History of noncompliance with weightbearing status     -Patient examined and evaluated at the bedside   -Hypotensive, otherwise VSS on heated high flow cannula. Leukocytosis noted (WBC 12.1). -Hemoglobin A1c 8.3%  -No imaging obtained 2/2 low concern of foot infection and well known chronic osteomyelitis from prior imaging.  -Utilizing #10 blade, excisional debridement down to and including muscle right foot wound to healthy bleeding tissues. Less than 3 cc bleeding noted. Hemostasis achieved with direct pressure. Patient tolerated procedure well.  -Utilize #10 blade, excisional debridement down to and including subcutaneous tissue left foot wound to healthy bleeding tissue margins. Less than 2 cc bleeding noted. Hemostasis achieved with direct pressure. Patient tolerated procedure well. -Dressing applied to left lower extremity consisting of wet-to-dry, gauze, Kerlix, Ace bandage  -Dressing applied to the right lower extremity consisting of wound VAC, gauze, Kerlix, Ace bandage.  -Wound VAC noted to have excellent seal, set to continuous 125 mm of suction.  -Continue antibiotics as previously prescribed per the recommendations of Dr. Lori Arciniega, vancomycin and meropenem through 11/26/2021  -Prevalon boots ordered to room. To be applied to bilateral lower extremity at all times of sitting/laying.  -Nonweightbearing to bilateral lower extremity         DISPO: Diabetic foot ulceration, bilateral lower extremity; clinically stable at this time. Continue IV antibiotics per prior ID recommendations. Strict nonweightbearing to bilateral lower extremity, patient will benefit from SNF placement.   Patient okay for discharge from podiatric standpoint pending medical clearance.     Discussed assessment and plan with Dr. Park Gonzalez DPM.    Trent Murary 26  11/05/21  9:24 AM

## 2021-11-05 NOTE — PROGRESS NOTES
Meropenem 1g IV q12h ordered for patient. This medication is renally eliminated. Will change to meropenem 500 mg IV q12h per renal dose adjustment policy. Estimated Creatinine Clearance: 24 mL/min (A) (based on SCr of 2.5 mg/dL (H)). Pharmacy will continue to monitor renal function and adjust dose as necessary. Please call with any questions. Thanks!   Brandi Hatfield PharmD, [facility]  Wireless: [phone]  11/5/2021 10:38 AM m

## 2021-11-05 NOTE — PROGRESS NOTES
Hospitalist Progress Note      PCP: Shira Schuster MD    Date of Admission: 11/3/2021    Chief Complaint: Elio Singletary, unresponsive at nursing home    Hospital Course:   61 y/o F w/ hx if RASHMI, COPD with baseline O2 0-1I, systolic HF, DVT (3139), bilateral lower foot wounds w/ partial amputations, osteomyelitis on vancomycin and meropenem, CKD, DM   Recent admissions  -- 10/01 - 10/16 - progressively worsening b/l lower extremity pain for 2 weeks, OR on 10/7 for bilateral lower extremity I&D with left fifth ray resection, right fourth ray resection, right partial cuboid resection. Surgical path from 10/7 overall showed partially treated osteomyelitis. Patient went back to the OR 10/13 for for repeat I&D with delayed primary closure of right lower extremity and I&D with partial cuboid resection and wound vac application. Patient was hypotensive and difficult to arouse after surgery. on BIPAP with improvement in mental status. Recommended to drop dose of Methadone as likely contributing to her encephalopathy  -- 10/18 - 10/20 --  minimally responsive shortly after receiving her typical 140 mg of methadone in the morning. On arrival of the EMS she was given Narcan with immediate improvement in mental status, she was also noted to have glucose in the 30s and was given glucagon with some improvement. noted to have hypoxia and was placed on nonrebreather oxygen as well as given Haldol and Versed for agitation. Insulin doses were adjusted and dc to facility  -- 10/23 - 11/01 -- due to waxing and wanning mental status. Diuresed with Lasix net -5 L, and discharged on torsemide and stopped methadone  - 11/03 --- lethargic at nursing home, not responding to sternal rub, after receiving 2 doses of narcan, intubated in the ED but extubated once in ICU where requiring NRB    Subjective:  On Vapotherm. Patient lethargic. Complains of pain all over.   Patient states that she did not overdose she was just sleepy from watching movies with a friend. She only took the medication that were given to her by the facility. Denies any other complaints. Medications:  Reviewed    Infusion Medications    dextrose      sodium chloride      heparin (PORCINE) Infusion 15 Units/kg/hr (11/05/21 0400)     Scheduled Medications    insulin glargine  7 Units SubCUTAneous Nightly    furosemide  60 mg IntraVENous BID    insulin lispro  0-12 Units SubCUTAneous TID WC    insulin lispro  0-6 Units SubCUTAneous Nightly    sodium chloride flush  5-40 mL IntraVENous 2 times per day    vancomycin  750 mg IntraVENous Q24H    meropenem  1,000 mg IntraVENous Q12H    metoprolol succinate  50 mg Oral Daily     PRN Meds: HYDROmorphone, glucose, dextrose, glucagon (rDNA), dextrose, sodium chloride flush, sodium chloride, ondansetron **OR** ondansetron, polyethylene glycol, acetaminophen **OR** acetaminophen, heparin (porcine), heparin (porcine), labetalol, hydrOXYzine, promethazine      Intake/Output Summary (Last 24 hours) at 11/5/2021 0812  Last data filed at 11/5/2021 0400  Gross per 24 hour   Intake 1719.26 ml   Output 1000 ml   Net 719.26 ml       Physical Exam Performed:    BP (!) 122/55   Pulse 78   Temp 98.5 °F (36.9 °C) (Oral)   Resp 12   Wt 189 lb 2.5 oz (85.8 kg)   LMP 10/23/2015   SpO2 100%   BMI 36.94 kg/m²   Constitutional:       Appearance: Chronically ill appearing  HENT:      Head: Normocephalic and atraumatic. Eyes:      Extraocular Movements: Extraocular movements intact.      Conjunctiva/sclera: Conjunctivae normal.   Cardiovascular:      Rate and Rhythm: Normal rate and regular rhythm. Pulmonary:   Bilateral rales present  Abdominal:   No tenderness, soft, BS +ve  Musculoskeletal:         General: Normal range of motion.      Cervical back: Normal range of motion.      Comments: BLEs wrapped in ace bandages. Lower extremity with wound VAC  Skin:     General: Skin is warm and dry.    Neurological:      General: No focal deficit present.      Mental Status: She is alert. Psychiatric:         Mood and Affect: Mood normal.     Labs:   Recent Labs     11/03/21 2000 11/04/21 0400 11/05/21  0402   WBC 10.8 16.4* 12.1*   HGB 10.2* 8.3* 7.5*   HCT 31.2* 25.2* 22.9*    419 371     Recent Labs     11/03/21  1444 11/04/21 0400 11/05/21 0402    139 135*   K 4.7 4.8 4.6   CL 99 102 99   CO2 23 27 28   BUN 51* 48* 55*   CREATININE 2.0* 2.0* 2.5*   CALCIUM 9.4 8.3 8.6   PHOS  --  4.9 5.0*     Recent Labs     11/03/21  1444   AST 90*   ALT 48*   BILITOT <0.2   ALKPHOS 348*     Recent Labs     11/03/21 2000   INR 1.30*     Recent Labs     11/03/21  1444 11/03/21 2000 11/03/21  2352   TROPONINI 0.04* 0.06* 0.06*       Urinalysis:      Lab Results   Component Value Date    NITRU Negative 10/23/2021    WBCUA None seen 10/23/2021    BACTERIA Rare 10/23/2021    RBCUA 0-2 10/23/2021    BLOODU Negative 10/23/2021    SPECGRAV 1.020 10/23/2021    GLUCOSEU 100 10/23/2021       Radiology:  XR CHEST PORTABLE   Final Result   Impression: Interval extubation. Stable appearance of the lungs. CT Head WO Contrast   Final Result      No acute intracranial pathology                 XR CHEST PORTABLE   Final Result   1. Endotracheal tube tip just directed at the right mainstem bronchus. Catheter can be withdrawn 1.5 to 2 cm.    2. Multifocal airspace disease      Critical finding reported to  Physician: Coni Enriquez  at 3:18 PM on 11/3/2021, and he confirmed that the tube has been repositioned after review of the above radiograph       XR CHEST PORTABLE    (Results Pending)           Assessment/Plan:    Active Hospital Problems    Diagnosis Date Noted    Acute encephalopathy [G93.40] 10/18/2021    Type 2 diabetes mellitus with left diabetic foot infection ([address] [N25.027, L08.9]     Type 2 diabetes mellitus with right diabetic foot infection ([address] [X06.117, L08.9] 03/12/2021    Chronic osteomyelitis of right foot with draining sinus [facility]) [M86.471] 06/03/2019    Diabetic polyneuropathy associated with type 2 diabetes mellitus ([facility] [facility] [E11.42] 03/26/2019    Open wound of right foot [S91.301A] 07/03/2018     #Acute respiratory failure with hypoxemia, intubated in the emergency room, extubated once was in ICU to NRB  #Pulmonary edema  Now requiring vapotherm to maintain O2 sats  She has diffuse crackles on examsuspect this is due to aspiration, she does not look grossly overloaded otherwise in exam.  Her baseline oxygen is 3-4 L NC  Continue antibiotics broad-spectrum vancomycin and Merrem  Continue diuretics    #Acute encephalopathy toxic due to narcotic overdose, improved with Narcan use. She was asked to stop methadone on discharge on 11/01  Unsure if she took methadone, patient denies  Avoid narcotics    #NSTEMI type II due to demand ischemia,   EKG trend is flat, no EKG changes critical ischemia or infarction    #Chronic DVTs  Continue heparin drip, will transition to apixaban when stable    #Osteomyelitis, chronic antibiotics  High fever on admission, ID following, continue broad-spectrum antibiotics  Right lower extremity wound VAC placed today per podiatry  Podiatry and ID following, appreciate recommendation    #FAHEEM on CKD stage IV, on discharge creatinine was 1.6 (11/01)  Continue diuresis, monitor renal profile daily    #Chronic diastolic congestive heart failure   Improved proBNP actually lower than discharge on 11/1  Continue diuresis    #Type 2 diabetes  Monitor blood glucose, elevated  Continue carb controlled diet   Lantus and sliding scale insulin dose increased  Hypoglycemia protocol,     DVT Prophylaxis: On heparin drip  Diet: ADULT DIET; Regular; 4 carb choices (60 gm/meal);  Low Sodium (2 gm)  Code Status: Full Code    PT/OT Eval Status: Order once acute issues resolved    Dispo -continue inpatient care in the intensive care unit once oxygen requirements are down can transition to progressive care unit    Louis Rose MD  Hospitalist

## 2021-11-05 NOTE — PROGRESS NOTES
RN recommended that patient turn in bed. RN educated patient on importance of repositioning in bed. Patient refuses to turn.

## 2021-11-05 NOTE — PLAN OF CARE
Problem: Pain:  Goal: Control of chronic pain  Description: Control of chronic pain  Outcome: Ongoing  Note: Pt reports symptoms of pain r/t chronic BLE discomfort  RN implemented non-pharmacological pain interventions to decrease patients pain level.   After re-assessment patients pain level is 8    See MAR for intervention

## 2021-11-05 NOTE — PROGRESS NOTES
Clinical Pharmacy Progress Note    Vancomycin - Management by Pharmacy    Consult Date(s): 11/3/21  Consulting Provider(s): Dr Phyllis Fenton / Plan    1) Osteomyelitis - Vancomycin   Concurrent Antimicrobials: meropenem   Current Dosing Method: Trough-Based Dosing   Therapeutic Goal: 15-20 mcg/mL   Current Dose / Frequency: 750 mg every 24 hours   Plan / Rationale:  o SCr increased to 2.5 mg/dL this AM. Given acute increase, will switch to intermittent dosing via levels at this time. o Vancomycin 750 mg IV already given this AM. Will order a 24 hr random level for tomorrow AM.   [facility] Will continue to monitor clinical condition and make adjustments to regimen as appropriate. 2) H/O DVT - Heparin  · Patient is on Eliquis at [facility] (last dose likely 11/3 AM)  · Continue aPTT monitoring to allow for Eliquis washout. Can switch back to anti-Xa monitoring 11/6 AM.     Please call with any questions. Giselle Ritchie, MargothD, [facility]  Wireless: [phone]  11/5/2021 10:41 AM      Interval update: Patient has been afebrile over the past 24 hrs. SCr increased to 2.5 mg/dL this AM.      Subjective/Objective: Ms. Nicholas Moya is a 60 y.o. female with a PMHx significant for osteomyelitis on vanc/meropenem in [place], CKD4, DM, narcotic dependent chronic pain, admitted for AMS which responded to Narcan. In the ED, patient became combative and went into respiratory distress requiring intubation. Per the ED, patient also vomited several times and there is concern for new aspiration PNA. Pharmacy has been consulted to continue patient's vancomycin dosing. Height:   Ht Readings from Last 1 Encounters:   10/28/21 5' (1.524 m)     Weight:   Wt Readings from Last 1 Encounters:   11/04/21 189 lb 2.5 oz (85.8 kg)       Level(s) / Doses:    Date Time Dose Level / Type of Level Interpretation                 Note: Serum levels collected for AUC-based dosing may be high if collected in close proximity to the dose administered.  This is not necessarily an indicator of toxicity. Cultures & Sensitivities:    Date Site Micro Susceptibility / Result   11/3 Blood x2 NGTD    11/3 Trach aspirate Collected    11/4 Urine No growth      Labs / Ancillary Data:    Estimated Creatinine Clearance: 24 mL/min (A) (based on SCr of 2.5 mg/dL (H)). Recent Labs     11/03/21  1444 11/03/21  1444 11/03/21  2000 11/04/21  0400 11/05/21  0402   CREATININE 2.0*  --   --  2.0* 2.5*   BUN 51*  --   --  48* 55*   WBC 12.3*   < > 10.8 16.4* 12.1*    < > = values in this interval not displayed.

## 2021-11-05 NOTE — CARE COORDINATION
Case Management Assessment           Daily Note                 Date/ Time of Note: 11/5/2021 11:10 AM         Note completed by: SAURABH Cui, RICHELLEW    Patient Name: Trina Cleveland  YOB: 1965    Diagnosis:Acute encephalopathy [G93.40]  Acute hypoxemic respiratory failure ([address] [J96.01]  Patient Admission Status: Inpatient    Date of Admission:11/3/2021  2:22 PM Length of Stay: 2 GLOS: GMLOS: 4.2    Current Plan of Care: afebrile, O2 requirement increased to vapotherm 30L, IV abx  ________________________________________________________________________________________  PT AM-PAC:   / 24 per last evaluation on: tbd    OT AM-PAC:   / 24 per last evaluation on: tbd    DME Needs for discharge: tbd  ________________________________________________________________________________________  Discharge Plan: To Be Determined DUE TO: afebrile, increased O2 requirement, IV abx    Tentative discharge date: tbd    Current barriers to discharge: not medically ready    Referrals completed: [facility]: [facility]    Resources/ information provided: Not indicated at this time  ________________________________________________________________________________________  Case Management Notes:  Pt from [facility]; lived at home with 2 adult children before SNF. Pt is not able to return to previous SNF, [facility] [place] due to recent drug use. Referral made today with Gal Hong at [address] ([phone]) to follow due to increased O2 needs. CM faxed clinical info to [phone].   CM called referrals made yesterday to follow up.    Chirag Blanco [phone] - left vm for Siva Rivas in admissions  -[facility] [phone] - left vm with Aditi Norman in admissions  -[facility] [phone] - left vm for Lebron Mendez in admissions    CM spoke with Rivera Stinson at the [facility] [phone] who reported that pt was last given med on 9/21, given 13 days of take-homes at 140mg/daily, prescribed by their dr, Dr. Graciela Light. At DC, CM will coordinate with new placement staff so staff can  take-homes and facility can distribute to pt daily. CM will continue to follow for DC needs and recs. Kandace Melchor and her family were provided with choice of provider; she and her family are in agreement with the discharge plan.     Care Transition Patient: SAURABH Sevilla, [facility]  Case Management Department  Ph: [phone]

## 2021-11-05 NOTE — CONSULTS
[facility]  Palliative Medicine Consultation Note      Date Of Admission:11/3/2021  Date of consult: 11/05/21  Seen by [facility] in the past:  No    Recommendations:        Met with the pt at the bedside, introduced palliative care. Asked if she recalled why she came to the hospital. She knows that she was brought in because she was lethargic. She reports that she was drowsy on the morning of presentation because she had stayed up late watching movies. She is upset that someone at her nursing facility accused her of doing drugs, she denies using drugs and reports that she's been taking her methadone as prescribed. The pt does not have a HCPOA. Explained legal NOK hierarchy. She has four children. Offered to complete a HCPOA with her this admission if she is interested. She is not interested at this time. 1. Goals of Care/Advanced Care planning/Code status: Full code, continue with current management. 2. Pain: Pt endorses baseline chronic pain, has been on methadone for last 20 years. There has been recent concern for increased somnolence on methadone on several previous admissions. She required narcan administration on arrival. She is currently on COWS protocol. Has orders for oxycodone 5mg q4h prn. Discussed this at length with the pt today, and she agrees that at times methadone has made her too drowsy. She does want to restart methadone and is upset that it was stopped. 3. SOB, acute respiratory failure 2/2 likely narcotic overdose: Currently on vapotherm, 30L. On lasix 60mg BID. Management per ICU team. There has also been concern for RASHMI, recommend sleep study outpatient. Discussed this with the pt today. 4. AMS 2/2 likely narcotic overdose: resolved, pt at baseline mental status today. 5. Disposition: [facility], d/w case management and pt is not able to return as they have expressed concerns for drug use.      Reason for Consult:         []  Goals of Care  [] Code Status Discussion/Advanced Care Planning   []  Psychosocial/Family Support  []  Symptom Management  [x]  Other (Specify) HF readmission    Requesting Physician: Dr. Pam White:  AMS    History Obtained From:  electronic medical record    History of Present Illness:         Yovany Burris is a 60 y.o. female with PMH of osteomyelitis (on vanc and merrem), CKD4, DVT, DMII, HTN, chronic pain on methadone who presented with altered mental status from her nursing facility. She was reportedly lethargic at her nursing facility, EMS was called and she received 2 doses of narcan. Reportedly following narcan she became combative. Was combative/agitated in the ED when suddenly she became apneic and cyanotic, lost a pulse and CPR was started. She was given narcan again, started breathing on her own and became combative. She was intubated for airway protection. Of note, pt has been on methadone for 20 years. It was recently discontinued (10/23) due to concerns for somnolence.      Subjective:         Past Medical History:        Diagnosis Date    Asthma 2004    Bacterial vaginosis 2008    Carpal tunnel syndrome 2007    COPD (chronic obstructive pulmonary disease) ([address]     Diabetes mellitus type II 2007    10/1/20 pt states does accucheck 2x/day at home    Diabetic neuropathy ([address]     Diastolic CHF ([address]     DVT (deep venous thrombosis) [facility] 2004    Dyslipidemia 2009    Dyspareunia 2009    ETOH abuse 2007    HTN (hypertension)     Hx of blood clots     Hyperlipidemia     MRSA (methicillin resistant staph aureus) culture positive 2017; 2017    foot; leg     Neuropathy 2009    polyneuropathy    Pancreatitis 2004    Tobacco abuse 2008    Uses wheelchair     also uses walker       Past Surgical History:        Procedure Laterality Date     SECTION  unknown    FOOT DEBRIDEMENT Right 2019    INCISION AND DRAINAGE WITH APPLICATION OF STRAVIX GRAFT RIGHT FOOT performed by Leticia Murray DPM at [address] Right 6/6/2019    RIGHT FOOT INCISION AND DRAINAGE WITH STAGING TRANSMETATARSAL AMPUTATION performed by Leticia Murray DPM at [address] Right 7/5/2019    RIGHT FOOT DEBRIDEMENT INCISION AND DRAINAGE, OPEN DIABETIC FOOT ULCER WITH GRAFT PLACEMENT performed by Leticia Murray DPM at [address] Left 10/23/2019    LEFT FOOT INCISION AND DRAINAGE , DEBRIDEMENT OF OPEN WOUND, APPLICATION OF STRAVIX GRAFT performed by Leticia Murray DPM at [address] Right 3/29/2021    INCISION AND DRAINAGE, DEBRIDEMENT OF DIABETIC WOUND WITH PLACEMENT OF STRAVIX GRAFT RIGHT FOOT performed by Leticia Murray DPM at [address]  10/7/2021    BILATERAL LOWER EXTREMITY INCISION AND DRAINAGE, RIGHT FOOT 4TH RAY RESECTION, LEFT FOOT 5TH RAY RESECTION performed by Leticia Murray DPM at [address] Bilateral 10/13/2021    REPEAT INCISION AND DRAINAGE OF ALL NONVIABLE SOFT TISSUE AND BONE/ PARTIAL CUBOID RESECTION/ BONY BIOPSY AS NEEDED/ WOUND VAC RIGHT LOWER EXTREMITY performed by Leticia Murray DPM at [address] IR TUNNELED [address] 5 YEARS  10/5/2021    IR TUNNELED CATHETER PLACEMENT GREATER THAN 5 YEARS 10/5/2021 [facility] SPECIAL PROCEDURES    KNEE SURGERY Left     from falling off ladder -- has screws in place pt report    OTHER SURGICAL HISTORY Left 05/25/2016    I & D left foot    OTHER SURGICAL HISTORY Right 10/20/2017    RIGHT GASTROC LENGTHENING ENDOSCOPIC, INJECTION OF AMNI GRAFT    OTHER SURGICAL HISTORY Right 04/26/2018    Diabetic foot ulcer I&D w/ integra graft application    AL DEBRIDEMENT, SKIN, SUB-Q TISSUE,MUSCLE,BONE,=<20 SQ CM Right 8/17/2018    RIGHT FOOT DEBRIDEMENT INCISION AND DRAINAGE, PARTIAL 5TH RAY AMPUTATION performed by Leticia Murray DPM at [address] / [address] 7/7003    cryotherapy done on lesion    TOE AMPUTATION Left 02/24/2017    AMPUTATION LEFT GREAT TOE                 TONSILLECTOMY         Current Medications:    Medications Prior to Admission: acetaminophen (TYLENOL) 325 MG tablet, Take 650 mg by mouth every 4 hours as needed for Pain or Fever (Mild pain, Temp >101 F)  atorvastatin (LIPITOR) 40 MG tablet, Take 40 mg by mouth nightly  furosemide (LASIX) 40 MG tablet, Take 40 mg by mouth 2 times daily  guaiFENesin-dextromethorphan (ROBITUSSIN DM) 100-10 MG/5ML syrup, Take 10 mLs by mouth every 4 hours as needed for Cough  magnesium hydroxide (MILK OF MAGNESIA) 400 MG/5ML suspension, Take 30 mLs by mouth daily as needed for Constipation  oxyCODONE (ROXICODONE) 5 MG immediate release tablet, Take 5 mg by mouth every 8 hours as needed for Pain.   bismuth subsalicylate (PEPTO BISMOL) 262 MG/15ML suspension, Take 30 mLs by mouth every 3-4 hours as needed for Indigestion or Diarrhea May take q3h PRN for diarrhea or q4h PRN for indigestion  Multiple Vitamins-Minerals (THERAPEUTIC MULTIVITAMIN-MINERALS) tablet, Take 1 tablet by mouth daily  insulin glargine (LANTUS SOLOSTAR) 100 UNIT/ML injection pen, Inject 7 Units into the skin nightly  glucagon 1 MG injection, Inject 1 kit into the muscle as needed (low blood sugar)  aspirin 81 MG chewable tablet, Take 81 mg by mouth daily   omeprazole (PRILOSEC) 20 MG delayed release capsule, Take 20 mg by mouth daily  ammonium lactate (LAC-HYDRIN) 12 % lotion, Apply topically nightly Apply to both legs topically at bedtime for itching  insulin aspart (NOVOLOG FLEXPEN) 100 UNIT/ML injection pen, Inject 8 Units into the skin 3 times daily (before meals)  vancomycin (VANCOCIN) 750 MG injection, Infuse 750 mg intravenously daily  meropenem (MERREM) 1 g injection, Infuse 1,000 mg intravenously every 12 hours   hydrALAZINE (APRESOLINE) 50 MG tablet, Take 1 tablet by mouth every 8 hours  metoprolol succinate (TOPROL XL) 50 MG extended release tablet, Take 1 tablet by mouth daily  ELIQUIS 5 MG TABS tablet, TAKE 1 TABLET BY MOUTH TWICE DAILY  escitalopram (LEXAPRO) 10 MG tablet, Take 1 tablet by mouth daily  amitriptyline (ELAVIL) 100 MG tablet, TAKE 1 TABLET BY MOUTH EVERY NIGHT AT BEDTIME  nystatin (MYCOSTATIN) 919235 UNIT/GM powder, Apply topically 2 times daily Apply to right breast and groin area BID  Insulin Pen Needle 31G X 5 MM MISC, 1 each by Does not apply route daily  [DISCONTINUED] gabapentin (NEURONTIN) 100 MG capsule, Take 2 capsules by mouth 2 times daily for 30 days. [DISCONTINUED] torsemide (DEMADEX) 20 MG tablet, Take 2 tablet by mouth daily  [DISCONTINUED] atorvastatin (LIPITOR) 20 MG tablet, Take 1 tablet by mouth nightly (Patient taking differently: Take 40 mg by mouth nightly )  blood glucose test strips (TRUE METRIX BLOOD GLUCOSE TEST) strip, 1 each by In Vitro route 2 times daily As needed. Lancets MISC, 1 each by Does not apply route 2 times daily PHARMACY MAY SUBSTITUTE TO TRUE METRIX LANCETS  [DISCONTINUED] Gauze Pads & Dressings MISC, Please dispense 4x8 guaze, kerlix, and ace    Allergies:  Sulfa antibiotics    Social History:    · TOBACCO: reports that she quit smoking about 3 years ago. Her smoking use included cigarettes. She has a 30.00 pack-year smoking history. She has never used smokeless tobacco.  · ETOH:   reports no history of alcohol use. · Patient currently lives in a nursing home    Review of Systems -   Review of Systems: A 10 point review of systems was conducted, significant findings as notedin HPI. Objective:          Physical Exam  Constitutional:       General: She is not in acute distress. Appearance: She is ill-appearing. Cardiovascular:      Rate and Rhythm: Normal rate and regular rhythm. Heart sounds: Normal heart sounds. Pulmonary:      Breath sounds: Normal breath sounds. Abdominal:      General: Bowel sounds are normal.      Palpations: Abdomen is soft.    Musculoskeletal: Right lower leg: No edema. Left lower leg: No edema. Skin:     General: Skin is warm and dry. Neurological:      Mental Status: She is alert and oriented to person, place, and time. Palliative Performance Scale:  [] 60% Ambulation reduced; Significant disease; Can't do hobbies/housework; intake normal or reduced; occasional assist; LOC full/confusion  [x] 50% Mainly sit/lie; Extensive disease; Can't do any work; Considerable assist; intake normal  Or reduced; LOC full/confusion  [] 40% Mainly in bed; Extensive disease; Mainly assist; intake normal or reduced; occasional assist; LOC full/confusion  [] 30% Bed Bound; Extensive disease; Total care; intake reduced; LOC full/confusion  [] 20% Bed Bound; Extensive disease; Total care; intake minimal; Drowsy/coma  [] 10% Bed Bound; Extensive disease; Total care; Mouth care only; Drowsy/coma  [] 0% Death    PPS: 50    Vitals:    BP (!) 122/55   Pulse 78   Temp 98.5 °F (36.9 °C) (Oral)   Resp 20   Wt 193 lb 12.6 oz (87.9 kg)   LMP 10/23/2015   SpO2 97%   BMI 37.85 kg/m²     Labs:    BMP:   Recent Labs     11/03/21  1444 11/04/21 0400 11/05/21  0402    139 135*   K 4.7 4.8 4.6   CL 99 102 99   CO2 23 27 28   BUN 51* 48* 55*   CREATININE 2.0* 2.0* 2.5*   GLUCOSE 200* 208* 272*     CBC:   Recent Labs     11/03/21 2000 11/04/21  0400 11/05/21  0402   WBC 10.8 16.4* 12.1*   HGB 10.2* 8.3* 7.5*   HCT 31.2* 25.2* 22.9*    419 371       LFT's:   Recent Labs     11/03/21  1444   AST 90*   ALT 48*   BILITOT <0.2   ALKPHOS 348*     Troponin:   Recent Labs     11/03/21  1444 11/03/21 2000 11/03/21  2352   TROPONINI 0.04* 0.06* 0.06*     BNP: No results for input(s): BNP in the last 72 hours. ABGs: No results for input(s): PHART, GUX5TYA, PO2ART in the last 72 hours. INR:   Recent Labs     11/03/21 2000   INR 1.30*       U/A:  Recent Labs     11/03/21  1742   PHUR 6.0       XR CHEST PORTABLE   Final Result   1.  Stable chest      XR CHEST PORTABLE   Final Result   Impression: Interval extubation. Stable appearance of the lungs. CT Head WO Contrast   Final Result      No acute intracranial pathology                 XR CHEST PORTABLE   Final Result   1. Endotracheal tube tip just directed at the right mainstem bronchus. Catheter can be withdrawn 1.5 to 2 cm. 2. Multifocal airspace disease      Critical finding reported to  Physician: Francia Dean  at 3:18 PM on 11/3/2021, and he confirmed that the tube has been repositioned after review of the above radiograph             Conclusion/Time spent:         Recommendations see above    Time spent with patient and/or family: 20  Time reviewing records: 10 min   Time communicating with staff: 5 min     A total of 35 minutes spent with the patient and family on unit greater than 50% in counseling regarding palliative care and in goals of care for the patient. Thank you to Dr. Neal Fajardo for this consultation. We will continue to follow Ms. Rutherford's care as needed.       [address] [facility]  Inpatient Palliative Care  [phone]

## 2021-11-06 NOTE — PROGRESS NOTES
Pt maintains in semi fowlers,right leaning position. RN offered to turn, educated pt on skin protection. Pt refuses.

## 2021-11-06 NOTE — PROGRESS NOTES
Podiatric Surgery Daily Progress Note  Remberto Burleson      Subjective :   Patient seen and examined this am at the bedside. Patient denies any acute overnight events. Patient denies N/V/F/C/SOB. Patient denies calf pain, thigh pain, chest pain. Review of Systems: A 12 point review of symptoms is unremarkable with the exception of the chief complaint. Patient specifically denies nausea, fever, vomiting, chills, shortness of breath, chest pain, abdominal pain, constipation or difficulty urinating. Objective     BP (!) 144/64   Pulse 76   Temp 97.8 °F (36.6 °C) (Axillary)   Resp 14   Wt 188 lb 7.9 oz (85.5 kg)   LMP 10/23/2015   SpO2 99%   BMI 36.81 kg/m²      I/O:    Intake/Output Summary (Last 24 hours) at 11/6/2021 1002  Last data filed at 11/6/2021 0523  Gross per 24 hour   Intake 960 ml   Output 1900 ml   Net -940 ml              Wt Readings from Last 3 Encounters:   11/06/21 188 lb 7.9 oz (85.5 kg)   10/30/21 200 lb 13.4 oz (91.1 kg)   10/20/21 223 lb 1.7 oz (101.2 kg)       LABS:    Recent Labs     11/05/21  0402 11/06/21  0514   WBC 12.1* 10.1   HGB 7.5* 8.0*   HCT 22.9* 24.3*    391        Recent Labs     11/06/21  0514      K 4.4   CL 99   CO2 29   PHOS 4.4   BUN 57*   CREATININE 2.2*        Recent Labs     11/03/21  1444 11/03/21 2000 11/03/21  2352 11/05/21  0340 11/06/21  0514   PROT 7.3  --   --   --   --    INR  --  1.30*  --   --   --    APTT  --  33.4   < > 65.5* 46.1*    < > = values in this interval not displayed. LOWER EXTREMITY EXAMINATION    Dressing to bilateral LE intact. No strikethrough noted to the external dressing. Scant sanguinous drainage noted to the internal layers of the dressing. VASCULAR: DP and PT pulses nonpalpable. Upon hand-held Doppler examination, biphasic signals noted to DP and PT bilateral.  CFT is brisk to the digits of the foot b/l. Skin temperature is warm to cool from proximal to distal with no focal calor noted.   Nonpitting multiple pedal amputations, bilateral lower extremity  -History of noncompliance with weightbearing status     -Patient examined and evaluated at the bedside   -Hypertensive, otherwise VSS on heated high flow cannula. No leukocytosis noted (WBC 10.1). -Hemoglobin A1c 8.3%  -No imaging obtained 2/2 low concern of foot infection and well known chronic osteomyelitis from prior imaging.  -Dressing applied to left lower extremity consisting of wet-to-dry, gauze, Kerlix, Ace bandage  -Dressing to the right lower extremity left clean, dry, and intact with no drainage noted to the outer dressings  -Wound VAC noted to have excellent seal, set to continuous 125 mm of suction.  -Continue antibiotics as previously prescribed per the recommendations of Dr. Alan Choudhury, vancomycin and meropenem through 11/26/2021  -Prevalon boots ordered to room. Applied to bilateral lower extremities  -Nonweightbearing to bilateral lower extremity       DISPO: Diabetic foot ulceration, bilateral lower extremity; clinically stable at this time. Continue IV antibiotics per prior ID recommendations. Strict nonweightbearing to bilateral lower extremity, patient will benefit from SNF placement. Patient okay for discharge from podiatric standpoint pending medical clearance.     Discussed assessment and plan with Dr. Carlos Eduardo Jacobs DPM.    Rachel Sparks DPM  11/06/21  10:01 AM

## 2021-11-06 NOTE — PROGRESS NOTES
Pharmacy Progress Note    Patient is on Heparin high dose dose weight based infusion, which is being monitored & adjusted using aPTT as pt received an oral Factor-Xa inhibitor (Apixaban) within 72 hrs of starting heparin infusion, which interacts with Anti-Xa monitoring of heparin. It has now been 72 hours since heparin infusion was initiated, and the oral Factor-Xa inhibitor interaction with Anti-Xa levels is eliminated. Heparin infusion will now be monitored & adjusted using Anti-Xa levels per the usual [facility] protocol.     Anti-Xa algorithm:    AntiXa < 0.10 Units/mL Bolus = 80 units/kg  Increase infusion by 4 units/kg/hr     (Maximum bolus = 10,000 units)  0.1-0.29 Units/mL     Bolus = 40 units/kg Increase infusion by 2 units/kg/hr   (Maximum bolus = 5,000 units)  0.3-0.7  Units/mL  No bolus  No change  0.71-0.80  Units/mL    No bolus   Decrease infusion by 1 units/kg/hr  0.81-0.99 Units/mL    No bolus   Decrease infusion by 2 units/kg/hr  1.0 Units/mL or greater  Hold infusion 1 hour     Decrease infusion by 3 units/kg/hr    Anti Xa levels 6 hours after any rate change  When 2 successive Anti Xa's are at goal   monitor Anti Xa level at least daily    Please call with questions--  Thanks--  Arianna Gifford, PharmD, BCPS, BCGP  [id number] (\[facility] [place] [facility]\"")   11/6/2021 10:11 AM

## 2021-11-06 NOTE — PROGRESS NOTES
Clinical Pharmacy Progress Note    Vancomycin - Management by Pharmacy    Consult Date(s): 11/3/21  Consulting Provider(s): Dr Claudio Bro / Plan  1)  B/L foot infection / osteomyelitis - Vancomycin   Concurrent Antimicrobials: Meropenem  o SCr improved and est CrCl now > 25mL/min. Will increase dose back to 1000mg IV q12h per [facility] Renal Dose Adjustment Policy.  Day of Vanc + Meropenem therapy:  #36   Current Dosing Method: Trough-Based Dosing  o Therapeutic Goal: 15-20 mcg/mL  o Patient has been on 750mg IV q24h. SCr increased acutely yesterday, so scheduled regimen was stopped and intermittent dosing was started. o Random level this AM (~19h after prior dose given) = 23mcg/mL. SCr slightly improved to 2.2, but still above baseline.  o Will not give any Vancomycin today to allow accumulated Vancomycin to clear. Kinetic estimates predict level will remain > 12 by tomorrow AM.  Will check random level & SCr in AM 11/7 to guide further dosing.  Will continue to monitor clinical condition and make adjustments to regimen as appropriate. Please call with questions--  Thanks--  Avis Garcia, PharmD, BCPS, BCGP  [id number] ([facility])   11/6/2021 12:10 PM      Interval update:   SCr improved slightly to 2.2 today, but still above baseline. Subjective/Objective: Ms. Tay Baum is a 60 y.o. female with a PMHx significant for osteomyelitis on vanc/meropenem in [place], CKD4, DM, narcotic dependent chronic pain, admitted for AMS which responded to Narcan. In the ED, patient became combative and went into respiratory distress requiring intubation. Per the ED, patient also vomited several times and there is concern for new aspiration PNA. Pharmacy has been consulted to continue patient's vancomycin dosing.     Ht Readings from Last 1 Encounters:   10/28/21 5' (1.524 m)      Wt Readings from Last 1 Encounters:   11/06/21 188 lb 7.9 oz (85.5 kg)       Vancomycin Level(s) / Doses:    Date Time Dose Level / Type of Level Interpretation   11/6 05:14 750mg q24h Random = 23.0mcg/mL Drawn ~19 hr after prior dose given, predicts  and trough 23.8          Note: Serum levels collected for AUC-based dosing may be high if collected in close proximity to the dose administered. This is not necessarily an indicator of toxicity. Cultures & Sensitivities:    Date Site Micro Susceptibility / Result   11/3 Blood x2 No growth to date    11/4 Urine No growth      Labs / Ancillary Data:    Estimated Creatinine Clearance: 27 mL/min (A) (based on SCr of 2.2 mg/dL (H)).     Recent Labs     11/04/21  0400 11/05/21  0402 11/06/21  0514   CREATININE 2.0* 2.5* 2.2*   BUN 48* 55* 57*   WBC 16.4* 12.1* 10.1

## 2021-11-06 NOTE — PROGRESS NOTES
Hospitalist Progress Note      PCP: Rich Nunez MD    Date of Admission: 11/3/2021    Chief Complaint: Selma Walls, unresponsive at nursing home    Hospital Course:   59 y/o F w/ hx if RASHMI, COPD with baseline O2 7-2X, systolic HF, DVT (1956), bilateral lower foot wounds w/ partial amputations, osteomyelitis on vancomycin and meropenem, CKD, DM   Recent admissions  -- 10/01 - 10/16 - progressively worsening b/l lower extremity pain for 2 weeks, OR on 10/7 for bilateral lower extremity I&D with left fifth ray resection, right fourth ray resection, right partial cuboid resection. Surgical path from 10/7 overall showed partially treated osteomyelitis. Patient went back to the OR 10/13 for for repeat I&D with delayed primary closure of right lower extremity and I&D with partial cuboid resection and wound vac application. Patient was hypotensive and difficult to arouse after surgery. on BIPAP with improvement in mental status. Recommended to drop dose of Methadone as likely contributing to her encephalopathy  -- 10/18 - 10/20 --  minimally responsive shortly after receiving her typical 140 mg of methadone in the morning. On arrival of the EMS she was given Narcan with immediate improvement in mental status, she was also noted to have glucose in the 30s and was given glucagon with some improvement. noted to have hypoxia and was placed on nonrebreather oxygen as well as given Haldol and Versed for agitation. Insulin doses were adjusted and dc to facility  -- 10/23 - 11/01 -- due to waxing and wanning mental status. Diuresed with Lasix net -5 L, and discharged on torsemide and stopped methadone  - 11/03 --- lethargic at nursing home, not responding to sternal rub, after receiving 2 doses of narcan, intubated in the ED but extubated once in ICU where requiring NRB    Subjective: On Vapotherm. Alert and oriented x2. More awake today. Following commands. Denies shortness of breath or nausea/vomiting.   Denies any other complaints. Medications:  Reviewed    Infusion Medications    dextrose      sodium chloride      heparin (PORCINE) Infusion 17 Units/kg/hr (11/06/21 1692)     Scheduled Medications    insulin glargine  18 Units SubCUTAneous Nightly    meropenem  1,000 mg IntraVENous Q12H    insulin lispro  0-18 Units SubCUTAneous TID WC    insulin lispro  0-9 Units SubCUTAneous Nightly    vancomycin (VANCOCIN) intermittent dosing (placeholder)   Other RX Placeholder    pantoprazole  40 mg Oral QAM AC    furosemide  60 mg IntraVENous BID    sodium chloride flush  5-40 mL IntraVENous 2 times per day    metoprolol succinate  50 mg Oral Daily     PRN Meds: oxyCODONE, glucose, dextrose, glucagon (rDNA), dextrose, sodium chloride flush, sodium chloride, ondansetron **OR** ondansetron, polyethylene glycol, acetaminophen **OR** acetaminophen, heparin (porcine), heparin (porcine), labetalol, hydrOXYzine, promethazine      Intake/Output Summary (Last 24 hours) at 11/6/2021 1233  Last data filed at 11/6/2021 0523  Gross per 24 hour   Intake 480 ml   Output 1000 ml   Net -520 ml       Physical Exam Performed:    BP (!) 144/64   Pulse 76   Temp 97.8 °F (36.6 °C) (Axillary)   Resp 14   Wt 188 lb 7.9 oz (85.5 kg)   LMP 10/23/2015   SpO2 98%   BMI 36.81 kg/m²   Constitutional:       Appearance: Chronically ill appearing  HENT:      Head: Normocephalic and atraumatic. Eyes:      Extraocular Movements: Extraocular movements intact.      Conjunctiva/sclera: Conjunctivae normal.   Cardiovascular:      Rate and Rhythm: Normal rate and regular rhythm. Pulmonary:   Bilateral rales present  Abdominal:   No tenderness, soft, BS +ve  Musculoskeletal:         General: Normal range of motion.      Cervical back: Normal range of motion.      Comments: BLEs wrapped in ace bandages. Lower extremity with wound VAC  Skin:     General: Skin is warm and dry.    Neurological:      General: No focal deficit present.      Mental Status: She is alert. Psychiatric:         Mood and Affect: Mood normal.     Labs:   Recent Labs     11/04/21  0400 11/05/21  0402 11/06/21  0514   WBC 16.4* 12.1* 10.1   HGB 8.3* 7.5* 8.0*   HCT 25.2* 22.9* 24.3*    371 391     Recent Labs     11/04/21  0400 11/05/21  0402 11/06/21  0514    135* 137   K 4.8 4.6 4.4    99 99   CO2 27 28 29   BUN 48* 55* 57*   CREATININE 2.0* 2.5* 2.2*   CALCIUM 8.3 8.6 8.7   PHOS 4.9 5.0* 4.4     Recent Labs     11/03/21  1444   AST 90*   ALT 48*   BILITOT <0.2   ALKPHOS 348*     Recent Labs     11/03/21 2000   INR 1.30*     Recent Labs     11/03/21  1444 11/03/21 2000 11/03/21  2352   TROPONINI 0.04* 0.06* 0.06*       Urinalysis:      Lab Results   Component Value Date    NITRU Negative 10/23/2021    WBCUA None seen 10/23/2021    BACTERIA Rare 10/23/2021    RBCUA 0-2 10/23/2021    BLOODU Negative 10/23/2021    SPECGRAV 1.020 10/23/2021    GLUCOSEU 100 10/23/2021       Radiology:  XR CHEST PORTABLE   Final Result   1. Stable chest      XR CHEST PORTABLE   Final Result   Impression: Interval extubation. Stable appearance of the lungs. CT Head WO Contrast   Final Result      No acute intracranial pathology                 XR CHEST PORTABLE   Final Result   1. Endotracheal tube tip just directed at the right mainstem bronchus. Catheter can be withdrawn 1.5 to 2 cm.    2. Multifocal airspace disease      Critical finding reported to  Physician: Dulce Qiu  at 3:18 PM on 11/3/2021, and he confirmed that the tube has been repositioned after review of the above radiograph               Assessment/Plan:    Active Hospital Problems    Diagnosis Date Noted    Acute encephalopathy [G93.40] 10/18/2021    Type 2 diabetes mellitus with left diabetic foot infection ([facility] [K05.295, L08.9]     Type 2 diabetes mellitus with right diabetic foot infection ([facility] [M74.545, L08.9] 03/12/2021    Chronic osteomyelitis of right foot with draining sinus ([facility] [W80.800] 06/03/2019    Diabetic poly[facility]uropathy associated with [facility] mellitus [facility] [E11.42] 03/26/2019    Open wound of right foot [S91.301A] 07/03/2018     #Acute respiratory failure with hypoxemia, intubated in the emergency room, extubated once was in ICU to NRB  #Pulmonary edema  She has diffuse crackles on examsuspect this is due to aspiration, she does not look grossly overloaded otherwise in exam.  Her baseline oxygen is 3-4 L NC  Continue vapotherm, 25 L with 100% FiO2  Monitor sats, wean as tolerated to keep sats more than 90%. Discussed with respiratory therapist and RN  Continue antibiotics broad-spectrum vancomycin and Merrem  Continue diuretics    #Acute encephalopathy toxic due to narcotic overdose, improved with Narcan use. She was asked to stop methadone on discharge on 11/01  Unsure if she took methadone, patient denies  Avoid narcotics    #NSTEMI type II due to demand ischemia,   EKG trend is flat, no EKG changes critical ischemia or infarction    #Chronic DVTs  Continue heparin drip, will transition to apixaban when stable    #Osteomyelitis, chronic antibiotics  High fever on admission, ID following, continue broad-spectrum antibiotics  Right lower extremity with wound VAC   Podiatry and ID following, appreciate recommendation    #FAHEEM on CKD stage IV, on discharge creatinine was 1.6 (11/01)  Continue diuresis, monitor renal profile daily    #Chronic diastolic congestive heart failure   Improved proBNP actually lower than discharge on 11/1  Continue diuresis    #Type 2 diabetes  Monitor blood glucose, elevated  Continue carb controlled diet and high-dose sliding scale insulin  Lantus dose increased  Hypoglycemia protocol    DVT Prophylaxis: On heparin drip  Diet: ADULT DIET; Regular; 3 carb choices (45 gm/meal);  Low Sodium (2 gm)  Code Status: Full Code    PT/OT Eval Status: Once able    Dispo -pending respiratory/clinical improvement, placement    Lul Villanueva MD  Hospitalist

## 2021-11-07 NOTE — PROGRESS NOTES
4 Eyes Admission Assessment     I agree as the admission nurse that 2 RN's have performed a thorough Head to Toe Skin Assessment on the patient. ALL assessment sites listed below have been assessed on admission. Areas assessed by both nurses:   [x]   Head, Face, and Ears   [x]   Shoulders, Back, and Chest  [x]   Arms, Elbows, and Hands   [x]   Coccyx, Sacrum, and Ischium  [x]   Legs, Feet, and Heels        Does the Patient have Skin Breakdown? Yes scattered bruising, redness buttocks, BLE ace wrap per podiatry  Ramierz Prevention initiated:  YES   Wound Care Orders initiated:  NO      [facility] nurse consulted for Pressure Injury (Stage 3,4, Unstageable, DTI, NWPT, and Complex wounds) or Ramirez score 18 or lower:  NO    Nurse 1 eSignature: Electronically signed by Larisa White RN on 11/7/21 at 5:20 AM EST    **SHARE this note so that the co-signing nurse is able to place an eSignature**    Nurse 2 eSignature: Electronically signed by Roselie Cowden, RN on 31/5/36 at 6:41 AM EST

## 2021-11-07 NOTE — PROGRESS NOTES
Podiatric Surgery Daily Progress Note  Yinka Heading      Subjective :   Patient seen and examined this am at the bedside. Patient denies any acute overnight events. Patient denies N/V/F/C/SOB. Patient denies calf pain, thigh pain, chest pain. Review of Systems: A 12 point review of symptoms is unremarkable with the exception of the chief complaint. Patient specifically denies nausea, fever, vomiting, chills, shortness of breath, chest pain, abdominal pain, constipation or difficulty urinating. Objective     BP (!) 156/71   Pulse 76   Temp 97.5 °F (36.4 °C) (Oral)   Resp 20   Wt 189 lb 13.1 oz (86.1 kg)   LMP 10/23/2015   SpO2 100%   BMI 37.07 kg/m²      I/O:    Intake/Output Summary (Last 24 hours) at 11/7/2021 1147  Last data filed at 11/7/2021 1046  Gross per 24 hour   Intake 760 ml   Output 2850 ml   Net -2090 ml              Wt Readings from Last 3 Encounters:   11/07/21 189 lb 13.1 oz (86.1 kg)   10/30/21 200 lb 13.4 oz (91.1 kg)   10/20/21 223 lb 1.7 oz (101.2 kg)       LABS:    Recent Labs     11/06/21  0514 11/07/21  0307   WBC 10.1 8.3   HGB 8.0* 7.8*   HCT 24.3* 23.8*    395        Recent Labs     11/07/21  0307   *   K 4.7   CL 97*   CO2 30   PHOS 4.4   BUN 57*   CREATININE 2.2*        Recent Labs     11/05/21  0340 11/06/21  0514   APTT 65.5* 46.1*           LOWER EXTREMITY EXAMINATION    Dressing to bilateral LE intact. No strikethrough noted to the external dressing. Scant sanguinous drainage noted to the internal layers of the dressing. VASCULAR: DP and PT pulses nonpalpable. Upon hand-held Doppler examination, biphasic signals noted to DP and PT bilateral.  CFT is brisk to the digits of the foot b/l. Skin temperature is warm to cool from proximal to distal with no focal calor noted. Nonpitting edema noted bilateral lower extremity.  No pain with calf compression b/l.     NEUROLOGIC:  Gross and epicritic sensation diminished bilateral.  Protective sensation absent at all pedal sites bilateral.     DERMATOLOGIC: Diffuse dermatologic changes noted bilateral lower extremity.     Right lower extremity:  Wound vac noted to right lower extremity, left clean, dry, and intact. Excellent seal noted with suction set to 125 mmHg. Scant sanginous drainage noted to cannister. Dressing to right lower extremity left clean, dry, and intact. No strikethrough noted to external dressing.     Left lower extremity:  Surgical incision noted to the lateral aspect of the left foot with skin edges coapted. Diffuse xerosis noted periincisional.  Mild areas of dehiscence noted towards the mid-distal aspect of the incision. No fluctuance, crepitus, erythema, drainage, or malodor noted. Full-thickness ulceration noted to the plantar aspect of the fourth metatarsal head with fibrotic base and macerated rim. Measures 2.4 cm x 2.1 cm x 0.1 cm. No fluctuance, crepitus, erythema, malodor, or drainage noted. Wound does not probe to bone, tunnel, or track. MUSCULOSKELETAL: Muscle strength 4/5 for all pedal compartments tested. No pain with palpation of the foot or ankle b/l. Ankle joint ROM is decreased in dorsiflexion with the knee extended. History of hallux amputation of fifth ray resection left foot. History of transmetatarsal amputation with fourth and fifth ray resections right foot.     ASSESSMENT/PLAN  -S/p I&D, partial cuboid resection of right foot, I&D with delayed primary closure of left foot 10/13/2021  -Diabetic foot ulceration, right foot, Carpenter 3  -Diabetic foot ulceration, left foot, Carpenter 1  -Peripheral vascular disease, bilateral lower extremity  -Edema, bilateral lower extremity  -Type 2 diabetes mellitus with peripheral neuropathy  -History of osteomyelitis, bilateral lower extremity  -History of multiple pedal amputations, bilateral lower extremity  -History of noncompliance with weightbearing status     -Patient examined and evaluated at the bedside   -Hypertensive, otherwise VSS on heated high flow cannula. No leukocytosis noted (WBC 10.1). -Hemoglobin A1c 8.3%  -No imaging obtained 2/2 low concern of foot infection and well known chronic osteomyelitis from prior imaging.  -Patient provided verbal consent to perform sharp excisional debridement at today's encounter. Using a sterile #10 blade, the wound noted to plantar lateral aspect of the foot was excisionally debrided down to and including down to healthy, bleeding tissue margins. Estimated blood loss less than 2 cc in total. Hemostasis obtained with direct pressure.  -Dressing applied to left lower extremity consisting of wet-to-dry, gauze, Kerlix, Ace bandage  -Dressing to the right lower extremity left clean, dry, and intact with no drainage noted to the outer dressings  -Wound VAC noted to have excellent seal, set to continuous 125 mm of suction.  -Continue antibiotics as previously prescribed per the recommendations of Dr. Feng Perez, vancomycin and meropenem through 11/26/2021  -Prevalon boots ordered to room. Applied to bilateral lower extremities  -Nonweightbearing to bilateral lower extremity       DISPO: Diabetic foot ulceration, bilateral lower extremity; clinically stable at this time. Continue IV antibiotics per prior ID recommendations. Strict nonweightbearing to bilateral lower extremity, patient will benefit from SNF placement. Patient okay for discharge from podiatric standpoint pending medical clearance.     Discussed assessment and plan with Dr. Michele Bernal DPM.    Delgado Chowdhury DPM  11/07/21  11:47 AM

## 2021-11-07 NOTE — PROGRESS NOTES
Clinical Pharmacy Progress Note    Vancomycin - Management by Pharmacy    Consult Date(s): 11/3/21  Consulting Provider(s): Dr Bhakta Burn / Plan  1)  B/L foot infection / osteomyelitis - Vancomycin   Concurrent Antimicrobials: Meropenem   Day of Vanc + Meropenem therapy:  #37   Current Dosing Method:  Intermittent dosing due to FAHEEM  o Therapeutic Goal: Trough ~15 mcg/mL  o Patient has been on 750mg IV q24h. SCr increased acutely 11/5, so scheduled regimen was stopped and intermittent dosing was started. o Random level this AM = 13.2mcg/mL  SCr stable at 2.2, but still above baseline.  o Will give Vancomycin 500mg IV x1 today. Will check random level & SCr in AM 11/8 to guide further dosing.  Will continue to monitor clinical condition and make adjustments to regimen as appropriate. Please call with questions--  Thanks--  Glenn Montes De Oca, PharmD, BCPS, BCGP  [id number] ([facility])   11/7/2021 8:38 AM      Interval update:   Transferred to PCU last evening. Subjective/Objective: Ms. Taiwo Haynes is a 60 y.o. female with a PMHx significant for osteomyelitis on vanc/meropenem in [place], CKD4, DM, narcotic dependent chronic pain, admitted for AMS which responded to Narcan. In the ED, patient became combative and went into respiratory distress requiring intubation. Per the ED, patient also vomited several times and there is concern for new aspiration PNA. Pharmacy has been consulted to continue patient's vancomycin dosing.     Ht Readings from Last 1 Encounters:   10/28/21 5' (1.524 m)      Wt Readings from Last 1 Encounters:   11/07/21 189 lb 13.1 oz (86.1 kg)       Vancomycin Level(s) / Doses:    Date Time Dose Level / Type of Level Interpretation   11/6 05:14 750mg q24h Random = 23.0mcg/mL Drawn ~19 hr after prior dose given, predicts  and trough 23.8   11/7 03:07  Random = 13.2mcg/mL Intermittent dosing                 Note: Serum levels collected for AUC-based dosing may be high if collected in close proximity to the dose administered. This is not necessarily an indicator of toxicity. Cultures & Sensitivities:    Date Site Micro Susceptibility / Result   11/3 Blood x2 No growth to date    11/4 Urine No growth      Labs / Ancillary Data:    Estimated Creatinine Clearance: 27 mL/min (A) (based on SCr of 2.2 mg/dL (H)).     Recent Labs     11/05/21  0402 11/06/21  0514 11/07/21  0307   CREATININE 2.5* 2.2* 2.2*   BUN 55* 57* 57*   WBC 12.1* 10.1 8.3

## 2021-11-07 NOTE — PROGRESS NOTES
Hospitalist Progress Note      PCP: Radha Delgado MD    Date of Admission: 11/3/2021    Chief Complaint: [place], unresponsive at nursing home    Hospital Course:   61 y/o F w/ hx if RASHMI, COPD with baseline O2 4-6E, systolic HF, DVT (7765), bilateral lower foot wounds w/ partial amputations, osteomyelitis on vancomycin and meropenem, CKD, DM   Recent admissions  -- 10/01 - 10/16 - progressively worsening b/l lower extremity pain for 2 weeks, OR on 10/7 for bilateral lower extremity I&D with left fifth ray resection, right fourth ray resection, right partial cuboid resection. Surgical path from 10/7 overall showed partially treated osteomyelitis. Patient went back to the OR 10/13 for for repeat I&D with delayed primary closure of right lower extremity and I&D with partial cuboid resection and wound vac application. Patient was hypotensive and difficult to arouse after surgery. on BIPAP with improvement in mental status. Recommended to drop dose of Methadone as likely contributing to her encephalopathy  -- 10/18 - 10/20 --  minimally responsive shortly after receiving her typical 140 mg of methadone in the morning. On arrival of the EMS she was given Narcan with immediate improvement in mental status, she was also noted to have glucose in the 30s and was given glucagon with some improvement. noted to have hypoxia and was placed on nonrebreather oxygen as well as given Haldol and Versed for agitation. Insulin doses were adjusted and dc to facility  -- 10/23 - 11/01 -- due to waxing and wanning mental status. Diuresed with Lasix net -5 L, and discharged on torsemide and stopped methadone  - 11/03 --- lethargic at nursing home, not responding to sternal rub, after receiving 2 doses of narcan, intubated in the ED but extubated once in ICU where requiring NRB    Subjective: Patient looks better. Alert and oriented x3. Following commands. Denies shortness of breath or nausea/vomiting.   Denies any other complaints. On NRB 10 L oxygen, satting 99%. Discussed with RN to wean patient off oxygen. Medications:  Reviewed    Infusion Medications    dextrose      sodium chloride      heparin (PORCINE) Infusion 15 Units/kg/hr (11/07/21 0256)     Scheduled Medications    vancomycin  500 mg IntraVENous Once    insulin glargine  18 Units SubCUTAneous Nightly    meropenem  1,000 mg IntraVENous Q12H    insulin lispro  0-18 Units SubCUTAneous TID WC    insulin lispro  0-9 Units SubCUTAneous Nightly    vancomycin (VANCOCIN) intermittent dosing (placeholder)   Other RX Placeholder    pantoprazole  40 mg Oral QAM AC    furosemide  60 mg IntraVENous BID    sodium chloride flush  5-40 mL IntraVENous 2 times per day    metoprolol succinate  50 mg Oral Daily     PRN Meds: oxyCODONE, glucose, dextrose, glucagon (rDNA), dextrose, sodium chloride flush, sodium chloride, ondansetron **OR** ondansetron, polyethylene glycol, acetaminophen **OR** acetaminophen, heparin (porcine), heparin (porcine), labetalol, hydrOXYzine, promethazine      Intake/Output Summary (Last 24 hours) at 11/7/2021 0853  Last data filed at 11/7/2021 0516  Gross per 24 hour   Intake 640 ml   Output 2250 ml   Net -1610 ml       Physical Exam Performed:    BP (!) 156/71   Pulse 76   Temp 97.5 °F (36.4 °C) (Oral)   Resp 20   Wt 189 lb 13.1 oz (86.1 kg)   LMP 10/23/2015   SpO2 100%   BMI 37.07 kg/m²     Constitutional:  Chronically ill appearing  HENT: Normocephalic and atraumatic. Extraocular movements intact. Conjunctivae normal.   Cardiovascular: Normal rate and regular rhythm with no murmur/gallops and rubs  Pulmonary: Normal respiratory effort. Bilateral rales improving  Abdominal: No tenderness, soft, BS +ve  Musculoskeletal: BLEs wrapped in ace bandages. Lower extremity with wound VAC  Skin: Skin is warm and dry. Neurological: Alert and oriented x3. Following commands.   Grossly nonfocal  Psychiatric: Mood normal.     Labs:   Recent Labs     11/05/21  0402 11/06/21  0514 11/07/21  0307   WBC 12.1* 10.1 8.3   HGB 7.5* 8.0* 7.8*   HCT 22.9* 24.3* 23.8*    391 395     Recent Labs     11/05/21  0402 11/06/21  0514 11/07/21  0307   * 137 135*   K 4.6 4.4 4.7   CL 99 99 97*   CO2 28 29 30   BUN 55* 57* 57*   CREATININE 2.5* 2.2* 2.2*   CALCIUM 8.6 8.7 8.6   PHOS 5.0* 4.4 4.4     No results for input(s): AST, ALT, BILIDIR, BILITOT, ALKPHOS in the last 72 hours. No results for input(s): INR in the last 72 hours. No results for input(s): Shireen Ill in the last 72 hours. Urinalysis:      Lab Results   Component Value Date    NITRU Negative 10/23/2021    WBCUA None seen 10/23/2021    BACTERIA Rare 10/23/2021    RBCUA 0-2 10/23/2021    BLOODU Negative 10/23/2021    SPECGRAV 1.020 10/23/2021    GLUCOSEU 100 10/23/2021       Radiology:  XR CHEST PORTABLE   Final Result   1. Stable chest      XR CHEST PORTABLE   Final Result   Impression: Interval extubation. Stable appearance of the lungs. CT Head WO Contrast   Final Result      No acute intracranial pathology                 XR CHEST PORTABLE   Final Result   1. Endotracheal tube tip just directed at the right mainstem bronchus. Catheter can be withdrawn 1.5 to 2 cm.    2. Multifocal airspace disease      Critical finding reported to  Physician: Marcelo Covarrubias  at 3:18 PM on 11/3/2021, and he confirmed that the tube has been repositioned after review of the above radiograph               Assessment/Plan:    Active Hospital Problems    Diagnosis Date Noted    Acute encephalopathy [G93.40] 10/18/2021    Type 2 diabetes mellitus with left diabetic foot infection ([facility] [address] [D87.213, L08.9]     Type 2 diabetes mellitus with right diabetic foot infection ([facility] [address] [C69.672, L08.9] 03/12/2021    Chronic osteomyelitis of right foot with draining sinus [facility]) [M86.471] 06/03/2019    Diabetic polyneuropathy associated with type 2 diabetes mellitus ([facility] [E11.42] 03/26/2019    Open wound of right foot [S91.301A] 07/03/2018     #Acute respiratory failure with hypoxemia, intubated in the emergency room, extubated once was in ICU to NRB. Required Vapotherm  #Pulmonary edema  She had diffuse crackles on exam. Was suspected to be due to aspiration, she did not look grossly overloaded otherwise on exam.    Her baseline oxygen is 3-4 L NC  Required Vapotherm, now on nonrebreather  Monitor sats, wean as tolerated to keep sats more than 90%. Discussed with RN and pulmonology  Continue antibiotics broad-spectrum vancomycin and Merrem  Continue diuretics  Pulmonology following, appreciate treatment    #Acute encephalopathy toxic due to narcotic overdose, improved with Narcan use. She was asked to stop methadone on discharge on 11/01  Unsure if she took methadone, patient denies  Avoid narcotics    #NSTEMI type II due to demand ischemia,   EKG trend flat, no EKG changes critical ischemia or infarction    #Chronic DVTs  Was placed on heparin drip on admission  Transition back to home apixaban    #Diabetic foot ulceration/chronic osteomyelitis  High fever on admission, ID following, continue broad-spectrum antibiotics till 11/26  Right lower extremity with wound VAC   Wound care per podiatry. Okay to DC per podiatry    #FAHEEM on CKD stage IV, on discharge creatinine was 1.6 (11/01)  Continue diuresis, monitor renal profile daily    #Chronic diastolic congestive heart failure   Improved proBNP actually lower than discharge on 11/1  Continue diuresis    #Type 2 diabetes  Monitor blood glucose, elevated  Continue carb controlled diet and high-dose sliding scale insulin  Lantus dose increased  Hypoglycemia protocol    DVT Prophylaxis: Eliquis  Diet: ADULT DIET; Regular; 3 carb choices (45 gm/meal);  Low Sodium (2 gm)  Code Status: Full Code    PT/OT Eval Status: Once able    Dispo -pending respiratory improvement, diuresis, placement    Nate Nicholas MD  Hospitalist

## 2021-11-07 NOTE — PLAN OF CARE
Problem: Pain:  Goal: Control of chronic pain  Description: Control of chronic pain  Outcome: Ongoing  Note: Monitoring pain trend, implementing non-pharm interventions and admins PRN pain medication. Assisted to positions of comfort. Problem: Skin Integrity:  Goal: Absence of new skin breakdown  Description: Absence of new skin breakdown  11/7/2021 1325 by Isaías Gallo RN  Outcome: Ongoing  Note: Pts legs/feet are wrapped in ace bandages via podiatry order. Assisted to comfortable positions and encouraged to continue moving in bed.      Problem: HEMODYNAMIC STATUS  Goal: Patient has stable vital signs and fluid balance  Outcome: Ongoing  Note: In: 240 [P.O.:240]  Out: 7800 [[id number]]   Pt on Iv lasix

## 2021-11-07 NOTE — PLAN OF CARE
Problem: Falls - Risk of:  Goal: Will remain free from falls  Description: Will remain free from falls  Outcome: Ongoing   Fall precautions in place. Bed alarm activated, low position, wheels locked. Bedrest.  Call light and belongings within reach. Continue to monitor safety. Problem: Pain:  Goal: Pain level will decrease  Description: Pain level will decrease  Outcome: Ongoing   C/o leg and foot pain, medicated with prn pain medication. Problem: Breathing Pattern - Ineffective:  Goal: Ability to achieve and maintain a regular respiratory rate will improve  Description: Ability to achieve and maintain a regular respiratory rate will improve  Outcome: Ongoing   RR WNL. 14 L NRB with sats 98-99%. Patient refusing HFNC. Problem: Skin Integrity:  Goal: Absence of new skin breakdown  Description: Absence of new skin breakdown  Outcome: Ongoing   Redness to buttocks, scattered bruising. Bilateral ace wraps per podiatry with prevalon boots. Patient repositioned q 2 hours to prevent further breakdown    Problem: FLUID AND ELECTROLYTE IMBALANCE  Goal: Fluid and electrolyte balance are achieved/maintained  Outcome: Ongoing   Monitoring electrolytes daily, replacing prn.   PW replaced for strict I & O's.

## 2021-11-08 NOTE — PROGRESS NOTES
Podiatric Surgery Daily Progress Note  Segun Leon      Subjective :   Patient seen and examined this am at the bedside. Patient denies any acute overnight events. Patient denies N/V/F/C/SOB. Patient denies calf pain, thigh pain, chest pain. Review of Systems: A 12 point review of symptoms is unremarkable with the exception of the chief complaint. Patient specifically denies nausea, fever, vomiting, chills, shortness of breath, chest pain, abdominal pain, constipation or difficulty urinating. Objective     /69   Pulse 73   Temp 99 °F (37.2 °C) (Axillary)   Resp 16   Wt 190 lb 14.7 oz (86.6 kg)   LMP 10/23/2015   SpO2 92%   BMI 37.29 kg/m²      I/O:    Intake/Output Summary (Last 24 hours) at 11/8/2021 0510  Last data filed at 11/7/2021 2149  Gross per 24 hour   Intake 600 ml   Output 1850 ml   Net -1250 ml              Wt Readings from Last 3 Encounters:   11/08/21 190 lb 14.7 oz (86.6 kg)   10/30/21 200 lb 13.4 oz (91.1 kg)   10/20/21 223 lb 1.7 oz (101.2 kg)       LABS:    Recent Labs     11/06/21  0514 11/07/21  0307   WBC 10.1 8.3   HGB 8.0* 7.8*   HCT 24.3* 23.8*    395        Recent Labs     11/07/21  0307   *   K 4.7   CL 97*   CO2 30   PHOS 4.4   BUN 57*   CREATININE 2.2*        Recent Labs     11/06/21  0514   APTT 46.1*           LOWER EXTREMITY EXAMINATION    Dressing to bilateral LE intact. No strikethrough noted to the external dressing. Scant sanguinous drainage noted to the internal layers of the dressing. VASCULAR: DP and PT pulses nonpalpable. Upon hand-held Doppler examination, biphasic signals noted to DP and PT bilateral.  CFT is brisk to the digits of the foot b/l. Skin temperature is warm to cool from proximal to distal with no focal calor noted. Nonpitting edema noted bilateral lower extremity.  No pain with calf compression b/l.     NEUROLOGIC:  Gross and epicritic sensation diminished bilateral.  Protective sensation absent at all pedal sites bilateral.     DERMATOLOGIC: Diffuse dermatologic changes noted bilateral lower extremity.     Right lower extremity:  Surgical incision noted to the lateral aspect distal to the wound with skin edges coapted. No signs of dehiscence noted. No fluctuance, crepitus, erythema, drainage, or malodor noted. Full-thickness ulceration noted to the plantar aspect of the cuboid with mixture of granular and fibrotic base and slightly macerated rim. Measures 4.4 cm x 2.8 cm x 0.8 cm. No fluctuance, crepitus, erythema, malodor, or drainage noted. Wound does not probe to bone, tunnel, or track.     Left lower extremity:  Surgical incision noted to the lateral aspect of the left foot with skin edges coapted. Diffuse xerosis noted periincisional.  Mild areas of dehiscence noted towards the mid-distal aspect of the incision. No fluctuance, crepitus, erythema, drainage, or malodor noted.           Full-thickness ulceration noted to the plantar aspect of the fourth metatarsal head with fibrotic base and macerated rim. Measures 2.4 cm x 2.1 cm x 0.1 cm. No fluctuance, crepitus, erythema, malodor, or drainage noted. Wound does not probe to bone, tunnel, or track. MUSCULOSKELETAL: Muscle strength 4/5 for all pedal compartments tested. No pain with palpation of the foot or ankle b/l. Ankle joint ROM is decreased in dorsiflexion with the knee extended. History of hallux amputation of fifth ray resection left foot. History of transmetatarsal amputation with fourth and fifth ray resections right foot.     ASSESSMENT/PLAN  -S/p I&D, partial cuboid resection of right foot, I&D with delayed primary closure of left foot 10/13/2021  -Diabetic foot ulceration, right foot, Carpenter 3  -Diabetic foot ulceration, left foot, Carpenter 1  -Peripheral vascular disease, bilateral lower extremity  -Edema, bilateral lower extremity  -Type 2 diabetes mellitus with peripheral neuropathy  -History of osteomyelitis, bilateral lower extremity  -History of multiple pedal amputations, bilateral lower extremity  -History of noncompliance with weightbearing status     -Patient examined and evaluated at the bedside   -VSS on heated high flow cannula. No Leukocytosis noted (WBC 6.7). -Hemoglobin A1c 8.3%  -No imaging obtained 2/2 low concern of foot infection and well known chronic osteomyelitis from prior imaging.  -Utilize #10 blade, excisional debridement down to and including subcutaneous tissue left foot wound to healthy bleeding tissue margins. Less than 2 cc bleeding noted. Hemostasis achieved with direct pressure. Patient tolerated procedure well. -Dressing applied to left lower extremity consisting of wet-to-dry, gauze, Kerlix, Ace bandage  -Dressing applied to the right lower extremity consisting of wound VAC, gauze, Kerlix, Ace bandage.  -Wound VAC noted to have excellent seal, set to continuous 125 mm of suction.  -Continue antibiotics as previously prescribed per the recommendations of Dr. Zuleyka Johnson, vancomycin and meropenem through 11/26/2021  -Prevalon boots reapplied to bilateral lower extremity. To be applied to bilateral lower extremity at all times of sitting/laying.  -Nonweightbearing to bilateral lower extremity         DISPO: Diabetic foot ulceration, bilateral lower extremity; clinically stable at this time. Continue IV antibiotics per prior ID recommendations. Strict nonweightbearing to bilateral lower extremity, patient will benefit from SNF placement. Patient okay for discharge from podiatric standpoint pending medical clearance.     Discussed assessment and plan with Dr. Parisa Benton DPM.    Belle Ascencio DPM  11/08/21  5:10 AM

## 2021-11-08 NOTE — PROGRESS NOTES
Labs     11/06/21 0514 11/07/21 0307 11/08/21  0529   WBC 10.1 8.3 6.7   HGB 8.0* 7.8* 7.8*   HCT 24.3* 23.8* 23.9*   MCV 86.2 86.2 86.1    395 382     BMP:   Recent Labs     11/06/21 0514 11/07/21 0307 11/08/21 0529    135* 140   K 4.4 4.7 4.1   CL 99 97* 98*   CO2 29 30 33*   PHOS 4.4 4.4 4.0   BUN 57* 57* 50*   CREATININE 2.2* 2.2* 1.7*     LIVER PROFILE: No results for input(s): AST, ALT, LIPASE, BILIDIR, BILITOT, ALKPHOS in the last 72 hours. Invalid input(s): AMYLASE,  ALB  PT/INR: No results for input(s): PROTIME, INR in the last 72 hours. APTT:   Recent Labs     11/06/21 0514   APTT 46.1*     UA:No results for input(s): NITRITE, COLORU, PHUR, LABCAST, WBCUA, RBCUA, MUCUS, TRICHOMONAS, YEAST, BACTERIA, CLARITYU, SPECGRAV, LEUKOCYTESUR, UROBILINOGEN, BILIRUBINUR, BLOODU, GLUCOSEU, AMORPHOUS in the last 72 hours. Invalid input(s): Margarita Morales      Assessment/Plan:  60 y.o. female with     Acute respiratory failure extubated on 11/4. She is normally on 3 liters O2. After extubation she required vapotherm or NRB. At this time she is down to 7 lpm.    Pulmonary edema  FAHEEM:  Creatinine is trending down. Leukocytosis - resolved. Osteomyelitis   Hx of DVTs on Eliquis      Hypoxia is improving  Continue lasix   Vanc and merrem per ID  Wean down O2 supplement to keep sats >=90  Avoid sedation as possible. She is requiring oxycodone 2-3 times a day.       Eloy Handley MD

## 2021-11-08 NOTE — PLAN OF CARE
Problem: Infection - Central Venous Catheter-Associated Bloodstream Infection:  Goal: Will show no infection signs and symptoms  Description: Will show no infection signs and symptoms  11/8/2021 0045 by Deni Galloway RN  Outcome: Ongoing   No s/s of infection this shift. Continue to monitor s/s.

## 2021-11-08 NOTE — PROGRESS NOTES
Hospitalist Progress Note      PCP: Luz Laboy MD    Date of Admission: 11/3/2021    Chief Complaint: Esther Massey, unresponsive at nursing home    Hospital Course:   59 y/o F w/ hx if RASHMI, COPD with baseline O2 0-3C, systolic HF, DVT (8016), bilateral lower foot wounds w/ partial amputations, osteomyelitis on vancomycin and meropenem, CKD, DM     Recent admissions  -- 10/01 - 10/16 - progressively worsening b/l lower extremity pain for 2 weeks, OR on 10/7 for bilateral lower extremity I&D with left fifth ray resection, right fourth ray resection, right partial cuboid resection. Surgical path from 10/7 overall showed partially treated osteomyelitis. Patient went back to the OR 10/13 for for repeat I&D with delayed primary closure of right lower extremity and I&D with partial cuboid resection and wound vac application. Patient was hypotensive and difficult to arouse after surgery. on BIPAP with improvement in mental status. Recommended to drop dose of Methadone as likely contributing to her encephalopathy  -- 10/18 - 10/20 --  minimally responsive shortly after receiving her typical 140 mg of methadone in the morning. On arrival of the EMS she was given Narcan with immediate improvement in mental status, she was also noted to have glucose in the 30s and was given glucagon with some improvement. noted to have hypoxia and was placed on nonrebreather oxygen as well as given Haldol and Versed for agitation. Insulin doses were adjusted and dc to facility  -- 10/23 - 11/01 -- due to waxing and wanning mental status. Diuresed with Lasix net -5 L, and discharged on torsemide and stopped methadone  - 11/03 --- lethargic at nursing home, not responding to sternal rub, after receiving 2 doses of narcan, intubated in the ED but extubated once in ICU where requiring NRB    Subjective: Seen and examined today. Sleepy but arousable. She is complaining of pain asking for her methadone. Alert oriented x3.   She cannot recall name of her methadone clinic. She is requesting to get methadone 30 mg instead of 140 mg daily. Patient is still on 8 L of oxygen satting 90%. Complaining of stomach upset we will give a GI cocktail. Medications:  Reviewed    Infusion Medications    dextrose      sodium chloride 25 mL (11/08/21 1247)     Scheduled Medications    vancomycin  500 mg IntraVENous Q24H    apixaban  5 mg Oral BID    insulin glargine  18 Units SubCUTAneous Nightly    meropenem  1,000 mg IntraVENous Q12H    insulin lispro  0-18 Units SubCUTAneous TID WC    insulin lispro  0-9 Units SubCUTAneous Nightly    pantoprazole  40 mg Oral QAM AC    furosemide  60 mg IntraVENous BID    sodium chloride flush  5-40 mL IntraVENous 2 times per day    metoprolol succinate  50 mg Oral Daily     PRN Meds: oxyCODONE, glucose, dextrose, glucagon (rDNA), dextrose, sodium chloride flush, sodium chloride, ondansetron **OR** ondansetron, polyethylene glycol, acetaminophen **OR** acetaminophen, heparin (porcine), heparin (porcine), labetalol, hydrOXYzine, promethazine      Intake/Output Summary (Last 24 hours) at 11/8/2021 1253  Last data filed at 11/8/2021 0948  Gross per 24 hour   Intake 240 ml   Output 1100 ml   Net -860 ml       Physical Exam Performed:    BP (!) 151/80   Pulse 72   Temp 97.8 °F (36.6 °C) (Oral)   Resp 16   Wt 190 lb 14.7 oz (86.6 kg)   LMP 10/23/2015   SpO2 91%   BMI 37.29 kg/m²     Constitutional:  Chronically ill appearing, tired  HENT: Normocephalic and atraumatic. Extraocular movements intact. Conjunctivae normal.   Cardiovascular: Normal rate and regular rhythm with no murmur/gallops and rubs  Pulmonary: Normal respiratory effort. Bilateral rales  Abdominal: No tenderness, soft, BS +ve  Musculoskeletal: BLEs wrapped in ace bandages. Lower extremity with wound VAC  Skin: Skin is warm and dry. Neurological: Alert and oriented x3. Following commands.   Grossly nonfocal  Psychiatric: Mood normal. Labs:   Recent Labs     11/06/21  0514 11/07/21  0307 11/08/21  0529   WBC 10.1 8.3 6.7   HGB 8.0* 7.8* 7.8*   HCT 24.3* 23.8* 23.9*    395 382     Recent Labs     11/06/21  0514 11/07/21  0307 11/08/21  0529    135* 140   K 4.4 4.7 4.1   CL 99 97* 98*   CO2 29 30 33*   BUN 57* 57* 50*   CREATININE 2.2* 2.2* 1.7*   CALCIUM 8.7 8.6 9.0   PHOS 4.4 4.4 4.0     No results for input(s): AST, ALT, BILIDIR, BILITOT, ALKPHOS in the last 72 hours. No results for input(s): INR in the last 72 hours. No results for input(s): Kitty Parisian in the last 72 hours. Urinalysis:      Lab Results   Component Value Date    NITRU Negative 10/23/2021    WBCUA None seen 10/23/2021    BACTERIA Rare 10/23/2021    RBCUA 0-2 10/23/2021    BLOODU Negative 10/23/2021    SPECGRAV 1.020 10/23/2021    GLUCOSEU 100 10/23/2021       Radiology:  XR CHEST PORTABLE   Final Result   1. Stable chest      XR CHEST PORTABLE   Final Result   Impression: Interval extubation. Stable appearance of the lungs. CT Head WO Contrast   Final Result      No acute intracranial pathology                 XR CHEST PORTABLE   Final Result   1. Endotracheal tube tip just directed at the right mainstem bronchus. Catheter can be withdrawn 1.5 to 2 cm.    2. Multifocal airspace disease      Critical finding reported to  Physician: Luanne Batres  at 3:18 PM on 11/3/2021, and he confirmed that the tube has been repositioned after review of the above radiograph               Assessment/Plan:    Active Hospital Problems    Diagnosis Date Noted    Acute encephalopathy [G93.40] 10/18/2021    Type 2 diabetes mellitus with left diabetic foot infection ([facility] [J27.380, L08.9]     Type 2 diabetes mellitus with right diabetic foot infection ([facility] [H12.262, L08.9] 03/12/2021    Chronic osteomyelitis of right foot with draining sinus [facility]) [I24.956] 06/03/2019    Diabetic polyneuropathy associated with type 2 [facility] mellitus [facility] [E11.42] 03/26/2019    Open wound of right foot [S91.301A] 07/03/2018     #Acute respiratory failure with hypoxemia, intubated in the emergency room, extubated once was in ICU to NRB. Required Vapotherm currently on 6 to 7 L of oxygen  #Pulmonary edema  Currently -4.9 L since admission. Weight is 190 pound  Her baseline oxygen is 3-4 L NC  Monitor sats, wean as tolerated to keep sats more than 90%. Discussed with RN   Continue antibiotics broad-spectrum vancomycin and Merrem  Continue diuretics IV Lasix 60 mg twice daily  Pulmonology following, appreciate treatment    #Acute encephalopathy toxic due to narcotic overdose, improved with Narcan use. She was asked to stop methadone on discharge on 11/01  Unsure if she took methadone, patient denies  Avoid narcotics    #NSTEMI type II due to demand ischemia,   EKG trend flat, no EKG changes critical ischemia or infarction    #Chronic DVTs  Was placed on heparin drip on admission  Continue Eliquis    #Diabetic foot ulceration/chronic osteomyelitis  High fever on admission, ID following, continue broad-spectrum antibiotics till 11/26  Right lower extremity with wound VAC   Wound care per podiatry. Okay to DC per podiatry    #FAHEEM on CKD stage IV, on discharge creatinine was 1.6 (11/01)  Creatinine stable at 1.7  Continue diuresis, monitor renal profile daily    #Chronic diastolic congestive heart failure   Improved proBNP actually lower than discharge on 11/1  Continue diuresis    #Type 2 diabetes  Monitor blood glucose, elevated  Continue carb controlled diet and high-dose sliding scale insulin  Lantus dose increased  Hypoglycemia protocol    DVT Prophylaxis: Eliquis  Diet: ADULT DIET; Regular; 3 carb choices (45 gm/meal);  Low Sodium (2 gm)  Code Status: Full Code    PT/OT Eval Status: Consulted    Dispo -pending respiratory improvement, diuresis, placement    Nicolle Pablo MD  Hospitalist

## 2021-11-08 NOTE — PROGRESS NOTES
Attempted to give change subclavian cath line dressing and give bath, but pt refused. Second nurse came to help and pt refused again.

## 2021-11-08 NOTE — PROGRESS NOTES
Clinical Pharmacy Progress Note    Vancomycin - Management by Pharmacy    Consult Date(s): 11/3/21  Consulting Provider(s): Dr Maritza Becker / Plan  1)  B/L foot infection / osteomyelitis - Vancomycin   Concurrent Antimicrobials: Meropenem   Day of Vanc + Meropenem therapy:  #38   Current Dosing Method:  Intermittent dosing due to FAHEEM  o Therapeutic Goal: Trough ~15 mcg/mL  o Patient has been on 750mg IV q24h. SCr increased acutely 11/5, so scheduled regimen was stopped and intermittent dosing was started. o Random level this AM = 13.9 mcg/mL, stable from yesterday. SCr improved today to 1.7 mg/dL. o Will order 500 mg IV q24h to begin today. Will order repeat level in a couple of days. o If discharged, recommend 500 mg IV q24h.  Will continue to monitor clinical condition and make adjustments to regimen as appropriate. Please call with any questions. Maria Dolores Siu, PharmD, BCPS  Wireless: [id number]  Main pharmacy: [id number]  11/8/2021 8:14 AM      Interval update:   No acute events overnight. Per podiatry, patient is clinically stable and okay for discharge pending medical clearance. Subjective/Objective: Ms. Nicolás Hernandes is a 60 y.o. female with a PMHx significant for osteomyelitis on vanc/meropenem in NH, CKD4, DM, narcotic dependent chronic pain, admitted for AMS which responded to Narcan. In the ED, patient became combative and went into respiratory distress requiring intubation. Per the ED, patient also vomited several times and there is concern for new aspiration PNA. Pharmacy has been consulted to continue patient's vancomycin dosing.     Ht Readings from Last 1 Encounters:   10/28/21 5' (1.524 m)      Wt Readings from Last 1 Encounters:   11/08/21 190 lb 14.7 oz (86.6 kg)       Vancomycin Level(s) / Doses:    Date Time Dose Level / Type of Level Interpretation   11/6 05:14 750mg q24h Random = 23.0mcg/mL Drawn ~19 hr after prior dose given, predicts  and trough 23.8   11/7 03:07  Random = 13.2mcg/mL Intermittent dosing   11/8 05:29  Random = 13.9 mcg/mL Will schedule 500 mg IV q24h          Note: Serum levels collected for AUC-based dosing may be high if collected in close proximity to the dose administered. This is not necessarily an indicator of toxicity. Cultures & Sensitivities:    Date Site Micro Susceptibility / Result   11/3 Blood x2 No growth to date    11/4 Urine No growth      Labs / Ancillary Data:    Estimated Creatinine Clearance: 35 mL/min (A) (based on SCr of 1.7 mg/dL (H)).     Recent Labs     11/06/21  0514 11/07/21  0307 11/08/21  0529   CREATININE 2.2* 2.2* 1.7*   BUN 57* 57* 50*   WBC 10.1 8.3 6.7

## 2021-11-08 NOTE — PLAN OF CARE
Problem: Falls - Risk of:  Goal: Will remain free from falls  Description: Will remain free from falls  Outcome: Ongoing   Fall precautions in place. Bed alarm activated, low position, wheels locked. Bedrest.  Patient calls for assistance appropriately. Call light and belongings within reach. Continue to monitor safety. Problem: Pain:  Goal: Pain level will decrease  Description: Pain level will decrease  Outcome: Ongoing   C/o bilateral leg and feet pain, medicated with prn roxicodone. Continue to assess. Problem: Breathing Pattern - Ineffective:  Goal: Ability to achieve and maintain a regular respiratory rate will improve  Description: Ability to achieve and maintain a regular respiratory rate will improve  Outcome: Ongoing   RR WNL. Problem: Skin Integrity:  Goal: Will show no infection signs and symptoms  Description: Will show no infection signs and symptoms  Outcome: Ongoing   No new signs of skin breakdown. Patient bathed with CHG wipes. Repositioned q 2 hours to prevent breakdown. Barrier applied to groin and buttocks. Heels with prevalon boots. Problem: OXYGENATION/RESPIRATORY FUNCTION  Goal: Patient will maintain patent airway  Outcome: Ongoing   SpO2 89-94% 6 L HFNC. Crackles scattered t/o. Problem: FLUID AND ELECTROLYTE IMBALANCE  Goal: Fluid and electrolyte balance are achieved/maintained  Outcome: Ongoing   Monitoring electrolytes daily, replacing as needed.       Problem: ACTIVITY INTOLERANCE/IMPAIRED MOBILITY  Goal: Mobility/activity is maintained at optimum level for patient  Outcome: Ongoing   Bedrest.

## 2021-11-08 NOTE — PLAN OF CARE
Problem: Falls - Risk of:  Goal: Will remain free from falls  Description: Will remain free from falls  Outcome: Met This Shift     Problem: Pain:  Goal: Control of acute pain  Description: Control of acute pain  Outcome: Ongoing     Problem: Breathing Pattern - Ineffective:  Goal: Ability to achieve and maintain a regular respiratory rate will improve  Description: Ability to achieve and maintain a regular respiratory rate will improve  Outcome: Ongoing   Pt increased to 8L and stable O2 in 90's. Problem: Skin Integrity:  Goal: Will show no infection signs and symptoms  Description: Will show no infection signs and symptoms  Outcome: Ongoing     Problem: Skin Integrity:  Goal: Absence of new skin breakdown  Description: Absence of new skin breakdown  Outcome: Ongoing   Podiatry monitoring legs. No other signs of further skin breakdown.      Problem: OXYGENATION/RESPIRATORY FUNCTION  Goal: Patient will maintain patent airway  Outcome: Met This Shift     Problem: HEMODYNAMIC STATUS  Goal: Patient has stable vital signs and fluid balance  Outcome: Ongoing     Problem: FLUID AND ELECTROLYTE IMBALANCE  Goal: Fluid and electrolyte balance are achieved/maintained  Outcome: Ongoing     Problem: Infection - Central Venous Catheter-Associated Bloodstream Infection:  Goal: Will show no infection signs and symptoms  Description: Will show no infection signs and symptoms  Outcome: Ongoing

## 2021-11-08 NOTE — PROGRESS NOTES
ID Follow-up NOTE    CC:   R DFI / OM. Fever, Leukocytosis  Antibiotics: Vancomycin, Meropenem    Admit Date: 11/3/2021  Hospital Day: 6    Subjective:     Patient is now awake, alert  C/o nausea, no emesis  C/o foot pain, 'both'    Objective:     Patient Vitals for the past 8 hrs:   BP Temp Temp src Pulse Resp SpO2 Weight   11/08/21 0805 (!) 151/80 97.8 °F (36.6 °C) Oral 72 16 (!) 88 %    11/08/21 0400       190 lb 14.7 oz (86.6 kg)   11/08/21 0354 116/69 99 °F (37.2 °C) Axillary 73 16 92 %      I/O last 3 completed shifts: In: 480 [P.O.:480]  Out: 1550 [Urine:1550]  No intake/output data recorded. EXAM:  GENERAL: No apparent distress.   7L  HEENT: Membranes moist, no oral lesion  NECK:  Supple, no lymphadenopathy  LUNGS: Clear b/l, no rales, no dullness  CARDIAC: RRR, no murmur appreciated  ABD:  + BS, soft / NT  EXT:  Bilateral pedal dressings  NEURO: No focal neurologic findings  PSYCH: Orientation, sensorium, mood normal  LINES:  R chest line in place       Data Review:  Lab Results   Component Value Date    WBC 6.7 11/08/2021    HGB 7.8 (L) 11/08/2021    HCT 23.9 (L) 11/08/2021    MCV 86.1 11/08/2021     11/08/2021     Lab Results   Component Value Date    CREATININE 1.7 (H) 11/08/2021    BUN 50 (H) 11/08/2021     11/08/2021    K 4.1 11/08/2021    CL 98 (L) 11/08/2021    CO2 33 (H) 11/08/2021       Hepatic Function Panel:   Lab Results   Component Value Date    ALKPHOS 348 11/03/2021    ALT 48 11/03/2021    AST 90 11/03/2021    PROT 7.3 11/03/2021    PROT 6.6 07/21/2011    BILITOT <0.2 11/03/2021    BILIDIR <0.2 10/26/2021    IBILI see below 10/26/2021    LABALBU 2.1 11/08/2021       MICRO:  11/3 BC no growth  11/3 Urine cult no growth    10/13 Surg cult  - no growth      10/7 Surg cult: GS 1+WBC, no organism; cult rare Ps aeruginosa, rare mixed skin sukhjinder  Antibiotic Interpretation ISAAC   cefepime Sensitive 8 mcg/mL   ciprofloxacin Resistant >2 mcg/mL   gentamicin Sensitive <=4 mcg/mL meropenem Sensitive <=1 mcg/mL   piperacillin-tazobactam Sensitive <=16 mcg/mL   tobramycin Sensitive <=4 mcg/mL      10/2 Wound (not know if L or R): light Ps aeruginosa (R cipro), light 2nd E coli, light Enterococcus faecalis  Pseudomonas aeruginosa   Antibiotic Interpretation ISAAC   cefepime Sensitive 8 mcg/mL   ciprofloxacin Resistant >2 mcg/mL   gentamicin Sensitive <=4 mcg/mL   meropenem Sensitive <=1 mcg/mL   piperacillin-tazobactam Sensitive <=16 mcg/mL   tobramycin Sensitive <=4 mcg/mL      Escherichia coli   Antibiotic Interpretation ISAAC   amoxicillin-clavulanate Intermediate 16/8 mcg/mL   ampicillin Resistant >16 mcg/mL   ceFAZolin Resistant >16 mcg/mL   cefepime Sensitive <=2 mcg/mL   cefTRIAXone Sensitive <=1 mcg/mL   cefuroxime Sensitive 8 mcg/mL   ciprofloxacin Resistant >2 mcg/mL   ertapenem Sensitive <=0.5 mcg/mL   gentamicin Sensitive <=4 mcg/mL   meropenem Sensitive <=1 mcg/mL   piperacillin-tazobactam Sensitive <=16 mcg/mL   trimethoprim-sulfamethoxazole Sensitive <=2/38 mcg/mL      Enterococcus  faecalis   Antibiotic Interpretation ISAAC   ampicillin Sensitive <=2 mcg/mL   vancomycin Sensitive 2 mcg/mL         IMAGIN/4 CXR  Impression:   Interval extubation. Stable appearance of the lungs.     11/3 Head CT  'No acute intracranial pathology'     11/3 CXR  1. Endotracheal tube tip just directed at the right mainstem bronchus. Catheter can be withdrawn 1.5 to 2 cm. 2. Multifocal airspace disease      10/3 L foot MRI  Impression   Osteomyelitis involving the fifth metatarsal and fifth proximal phalanx with extensive osseous destruction. Mild osteomyelitis involving the lateral aspect of the cuboid. Prior partial first digit amputation      10/3 R foot MRI   Impression   Multiple prior amputations.    Soft tissue ulceration lateral to the cuboid with mild acute osteomyelitis involving the lateral base of the fourth metatarsal and more distal plantar aspect of the remaining fourth metatarsal shaft as well as the lateral aspect of the cuboid.        Scheduled Meds:   vancomycin  500 mg IntraVENous Q24H    GI cocktail   Oral Once    apixaban  5 mg Oral BID    insulin glargine  18 Units SubCUTAneous Nightly    meropenem  1,000 mg IntraVENous Q12H    insulin lispro  0-18 Units SubCUTAneous TID WC    insulin lispro  0-9 Units SubCUTAneous Nightly    pantoprazole  40 mg Oral QAM AC    furosemide  60 mg IntraVENous BID    sodium chloride flush  5-40 mL IntraVENous 2 times per day    metoprolol succinate  50 mg Oral Daily       Continuous Infusions:   dextrose      sodium chloride 25 mL (11/08/21 0817)       PRN Meds:  oxyCODONE, glucose, dextrose, glucagon (rDNA), dextrose, sodium chloride flush, sodium chloride, ondansetron **OR** ondansetron, polyethylene glycol, acetaminophen **OR** acetaminophen, heparin (porcine), heparin (porcine), labetalol, hydrOXYzine, promethazine      Assessment:     Obesity (BMI 37), DM, neuropathy, HTN, HL, CKD, chronic pain  Hx DM foot infection / surgeries - has R TMA, L hallux resection     L foot wound infection / osteomyelitis   - MRI with 5th MT + prox 5th phalanx destruction, lateral cuboid edema   - OR 10/7 - 5th ray resection; path - 'focal subacute, partially treated osteomyelitis' (L 5th MT)     R foot wound infection / osteomyelitis - limb threatening, possible limb loss / BKA discussed with pt in past   - MRI with cuboid, 4th MT OM   - OR 10/7 - R 4th ray, partial cuboid resection - NOT definitive   - Path with 4th MT acute osteomyelitis, R cuboid 'focal acute osteomyelitis', R cuboid clearance frag 'rare focus of acute osteomyelitis'   - OR 10/13 - L I&D and closure; R I&D, further cuboid resection, VAC   - Cult polymicrobial with Ps aerug (R cipro), E coli, E faecalis     Narcotic dependence  Encephalopathy      Fever   - risk aspiration, line infection, progression pedal / foot infection (had recommended R amputation), fungemia   - RESOLVED    Plan: Cont vancomycin, meropenem     Will review imaging  Pedal wound care per Podiatry     See EBONY    Medical Decision Making:   The following items were considered in medical decision making:  Discussion of patient care with other providers  Reviewed clinical lab tests  Reviewed radiology tests  Reviewed other diagnostic tests/interventions  Independent review of radiologic images  Microbiology cultures and other micro tests reviewed      Discussed with pt  Angeles Keys MD     INFUSION ORDERS:   Vancomycin 500 mg iv daily through 11/26  Meropenem 1 gm iv q 12 through 11/26  - Diagnosis - DM foot osteomyelitis   - First dose given in hospital  - PICC   - Disposition / date discharge  - Check CBC w diff, CMP, ESR, CRP, CK every Mon or Tue - FAX result to [phone]  - Call with antibiotic / infusion issues, [phone]  - Call with any change in status, transfer in or out of a facility or to hospital - [phone]  - No f/u in outpatient ID office necessary

## 2021-11-08 NOTE — PROGRESS NOTES
NUTRITION NOTE   Admission Date: 11/3/2021     Type and Reason for Visit: Initial, Positive Nutrition Screen    NUTRITION RECOMMENDATIONS:   1. PO Diet: Continue current diet and encourage dietary protein at meals. 2. ONS: adding Joshua BID. NUTRITION ASSESSMENT:  Nutrition eval for nutrition screen for wounds. Pt well known to this department from previous/recent admits. RD will add ONS BID for wounds at this time and montior nutritional adequacy through admission. Patient admitted d/t acute respiratory failure that required pt be intubated this admission. Lethargic, unresponsive at nursing home; multiple wounds requiring multiple I&D. PMH significant for RASHMI, COPD with baseline O2 5-1X, systolic HF, DVT (5664), bilateral lower foot wounds w/ partial amputations, osteomyelitis on vancomycin and meropenem, CKD, DM. MALNUTRITION ASSESSMENT  Context of Malnutrition: Chronic Illness   Malnutrition Status: At risk for malnutrition (Comment)    NUTRITION DIAGNOSIS   · Increased nutrient needs related to increase demand for energy/nutrients as evidenced by wounds    [address] and/or Nutrient Delivery:  Start Oral Nutrition Supplement, Continue Current Diet  Nutrition Education/Counseling:  Education not appropriate      The patient will still be monitored per nutrition standards of care. Consult dietitian if nutrition interventions essential to patient care is needed.      Raman Lu, [address] [place] [address],   [place]:  [phone]  Office:  [phone]

## 2021-11-09 NOTE — PROGRESS NOTES
Hospitalist Progress Note      PCP: Rene Restrepo MD    Date of Admission: 11/3/2021    Chief Complaint: Dee Dee Kelley, unresponsive at nursing home    Hospital Course:   61 y/o F w/ hx if RASHMI, COPD with baseline O2 6-7U, systolic HF, DVT (1404), bilateral lower foot wounds w/ partial amputations, osteomyelitis on vancomycin and meropenem, CKD, DM     Recent admissions  -- 10/01 - 10/16 - progressively worsening b/l lower extremity pain for 2 weeks, OR on 10/7 for bilateral lower extremity I&D with left fifth ray resection, right fourth ray resection, right partial cuboid resection. Surgical path from 10/7 overall showed partially treated osteomyelitis. Patient went back to the OR 10/13 for for repeat I&D with delayed primary closure of right lower extremity and I&D with partial cuboid resection and wound vac application. Patient was hypotensive and difficult to arouse after surgery. on BIPAP with improvement in mental status. Recommended to drop dose of Methadone as likely contributing to her encephalopathy  -- 10/18 - 10/20 --  minimally responsive shortly after receiving her typical 140 mg of methadone in the morning. On arrival of the EMS she was given Narcan with immediate improvement in mental status, she was also noted to have glucose in the 30s and was given glucagon with some improvement. noted to have hypoxia and was placed on nonrebreather oxygen as well as given Haldol and Versed for agitation. Insulin doses were adjusted and dc to facility  -- 10/23 - 11/01 -- due to waxing and wanning mental status. Diuresed with Lasix net -5 L, and discharged on torsemide and stopped methadone  - 11/03 --- lethargic at nursing home, not responding to sternal rub, after receiving 2 doses of narcan, intubated in the ED but extubated once in ICU where requiring NRB    Subjective: Patient is complaining of some abdominal pain associated nausea, nonradiating denies any association with food.   Asking for her gabapentin and amitriptyline. Oxygen requirement has been variable. Discussed with pulmonary    Medications:  Reviewed    Infusion Medications    furosemide (LASIX) 1mg/ml infusion      dextrose      sodium chloride 25 mL (11/08/21 1247)     Scheduled Medications    amitriptyline  50 mg Oral Nightly    vancomycin  500 mg IntraVENous Q24H    apixaban  5 mg Oral BID    insulin glargine  18 Units SubCUTAneous Nightly    meropenem  1,000 mg IntraVENous Q12H    insulin lispro  0-18 Units SubCUTAneous TID WC    insulin lispro  0-9 Units SubCUTAneous Nightly    pantoprazole  40 mg Oral QAM AC    sodium chloride flush  5-40 mL IntraVENous 2 times per day    metoprolol succinate  50 mg Oral Daily     PRN Meds: oxyCODONE, glucose, dextrose, glucagon (rDNA), dextrose, sodium chloride flush, sodium chloride, ondansetron **OR** ondansetron, polyethylene glycol, acetaminophen **OR** acetaminophen, heparin (porcine), heparin (porcine), labetalol, hydrOXYzine, promethazine      Intake/Output Summary (Last 24 hours) at 11/9/2021 1305  Last data filed at 11/9/2021 1049  Gross per 24 hour   Intake 665 ml   Output 1150 ml   Net -485 ml       Physical Exam Performed:    BP (!) 121/45   Pulse 71   Temp 98.3 °F (36.8 °C) (Oral)   Resp 18   Ht 5' (1.524 m)   Wt 179 lb 10.8 oz (81.5 kg)   LMP 10/23/2015   SpO2 91%   BMI 35.09 kg/m²     Constitutional:  Chronically ill appearing, tired  HENT: Normocephalic and atraumatic. Extraocular movements intact. Conjunctivae normal.   Cardiovascular: Normal rate and regular rhythm with no murmur/gallops and rubs  Pulmonary: Normal respiratory effort. Bilateral rales, no wheeze. Abdominal: No tenderness, soft, BS +ve  Musculoskeletal: BLEs wrapped in ace bandages. Lower extremity with wound VAC  Skin: Skin is warm and dry. Neurological: Alert and oriented x3. Following commands.   Grossly nonfocal  Psychiatric: Mood normal.     Labs:   Recent Labs     11/07/21  2212 11/08/21  0529 11/09/21  0640   WBC 8.3 6.7 6.0   HGB 7.8* 7.8* 8.5*   HCT 23.8* 23.9* 25.8*    382 391     Recent Labs     11/07/21  0307 11/08/21  0529 11/09/21  0640   * 140 137   K 4.7 4.1 4.3   CL 97* 98* 96*   CO2 30 33* 36*   BUN 57* 50* 46*   CREATININE 2.2* 1.7* 1.7*   CALCIUM 8.6 9.0 8.7   PHOS 4.4 4.0 3.6     No results for input(s): AST, ALT, BILIDIR, BILITOT, ALKPHOS in the last 72 hours. No results for input(s): INR in the last 72 hours. No results for input(s): Assunta Fortune in the last 72 hours. Urinalysis:      Lab Results   Component Value Date    NITRU Negative 10/23/2021    WBCUA None seen 10/23/2021    BACTERIA Rare 10/23/2021    RBCUA 0-2 10/23/2021    BLOODU Negative 10/23/2021    SPECGRAV 1.020 10/23/2021    GLUCOSEU 100 10/23/2021       Radiology:  XR CHEST PORTABLE   Final Result   1. Stable chest      XR CHEST PORTABLE   Final Result   Impression: Interval extubation. Stable appearance of the lungs. CT Head WO Contrast   Final Result      No acute intracranial pathology                 XR CHEST PORTABLE   Final Result   1. Endotracheal tube tip just directed at the right mainstem bronchus. Catheter can be withdrawn 1.5 to 2 cm.    2. Multifocal airspace disease      Critical finding reported to  Physician: Katie Khan  at 3:18 PM on 11/3/2021, and he confirmed that the tube has been repositioned after review of the above radiograph               Assessment/Plan:    Active Hospital Problems    Diagnosis Date Noted    Acute encephalopathy [G93.40] 10/18/2021    Type 2 diabetes mellitus with left diabetic foot infection ([facility] [J73.275, L08.9]     Type 2 diabetes mellitus with right diabetic foot infection ([facility] [address] [A44.254, L08.9] 03/12/2021    Chronic osteomyelitis of right foot with draining sinus [facility]) [M86.471] 06/03/2019    Diabetic polyneuropathy associated with type 2 diabetes mellitus ([facility] [E11.42] 03/26/2019    Open wound of right foot Seamus López 07/03/2018     #Acute respiratory failure with hypoxemia, intubated in the emergency room, extubated once was in ICU to NRB. Required Vapotherm currently on 8L of oxygen  #Pulmonary edema  Currently 65 L since admission. Weight is 179 pound  Her baseline oxygen is 3-4 L NC  Monitor sats, wean as tolerated to keep sats more than 90%. Discussed with RN   Continue antibiotics broad-spectrum vancomycin and Merrem  Continue diuretics IV Lasix 60 mg twice daily  Pulmonology following, appreciate treatment  We will consult pulmonary to assist with diuresis in light of CKD. #Abdominal pain  We will consult GI to rule out any peptic ulcer disease. Continue Protonix. #Acute encephalopathy toxic due to narcotic overdose, improved with Narcan use. She was asked to stop methadone on discharge on 11/01  Unsure if she took methadone, patient denies  Avoid narcotics    #NSTEMI type II due to demand ischemia,   EKG trend flat, no EKG changes critical ischemia or infarction    #Chronic DVTs  Was placed on heparin drip on admission  Continue Eliquis    #Diabetic foot ulceration/chronic osteomyelitis  High fever on admission, ID following, continue broad-spectrum antibiotics till 11/26  Right lower extremity with wound VAC   Wound care per podiatry. Okay to DC per podiatry    #FAHEEM on CKD stage IV, on discharge creatinine was 1.6 (11/01)  Creatinine stable at 1.7  Continue diuresis, monitor renal profile daily    #Chronic diastolic congestive heart failure   Improved proBNP actually lower than discharge on 11/1  Continue diuresis    #Type 2 diabetes  Monitor blood glucose, elevated  Continue carb controlled diet and high-dose sliding scale insulin  Lantus dose increased  Hypoglycemia protocol    DVT Prophylaxis: Eliquis  Diet: ADULT DIET; Regular; 3 carb choices (45 gm/meal); Low Sodium (2 gm)  ADULT ORAL NUTRITION SUPPLEMENT; Breakfast, Dinner;  Wound Healing Oral Supplement  Code Status: Full Code    PT/OT Pal Status: Consulted    Dispo -pending respiratory improvement, diuresis, placement    This chart was likely completed using voice recognition technology and may contain unintended grammatical , phraseology,and/or punctuation errors    Omar Lee MD  Hospitalist

## 2021-11-09 NOTE — PLAN OF CARE
Problem: Falls - Risk of:  Goal: Will remain free from falls  Description: Will remain free from falls  11/9/2021 0257 by Therese Villalta, RN  Outcome: Ongoing  Note: Pt is on bedrest. Call light within reach. Bed alarm is on. Problem: Pain:  Goal: Control of chronic pain  Description: Control of chronic pain  Outcome: Ongoing  Note: Pt has prn pain medication, but it is not helping much.  Pt encouraged to reposition to help with comfort      Problem: OXYGENATION/RESPIRATORY FUNCTION  Goal: Patient will maintain patent airway  11/9/2021 0257 by Therese Villalta, RN  Outcome: Ongoing  Note: Airway remains patent on 6L HFN

## 2021-11-09 NOTE — PROGRESS NOTES
ID Follow-up NOTE    CC:   R DFI / OM. Fever, Leukocytosis  Antibiotics: Vancomycin, Meropenem    Admit Date: 11/3/2021  Hospital Day: 7    Subjective:     C/o nausea with abd pain, constant, no change with food, no emesis  C/o foot pain, L > R today    Objective:     Patient Vitals for the past 8 hrs:   BP Temp Temp src Pulse Resp SpO2 Weight   11/09/21 1112 (!) 121/45 98.3 °F (36.8 °C) Oral 71 18 91 %    11/09/21 1048    73  91 %    11/09/21 0908      93 %    11/09/21 0907 (!) 148/55 98.6 °F (37 °C) Oral 69 18 98 %    11/09/21 0638      98 %    11/09/21 0600       179 lb 10.8 oz (81.5 kg)   11/09/21 0505     18 93 %    11/09/21 0404 (!) 120/50 97.4 °F (36.3 °C) Axillary 64 18 94 %      I/O last 3 completed shifts: In: 6557 [P.O.:1020; I.V.:5]  Out: 2300 [Urine:2300]  I/O this shift: In: 0   Out: 450 [Urine:450]    EXAM:  GENERAL: No apparent distress.   8L  HEENT: Membranes moist, no oral lesion  NECK:  Supple, no lymphadenopathy  LUNGS: Clear b/l, no rales, no dullness  CARDIAC: RRR, no murmur appreciated  ABD:  + BS, soft - tender in epigastrium  EXT:  Bilateral pedal dressings  NEURO: No focal neurologic findings  PSYCH: Orientation, sensorium, mood normal  LINES:  R chest line in place       Data Review:  Lab Results   Component Value Date    WBC 6.0 11/09/2021    HGB 8.5 (L) 11/09/2021    HCT 25.8 (L) 11/09/2021    MCV 86.1 11/09/2021     11/09/2021     Lab Results   Component Value Date    CREATININE 1.7 (H) 11/09/2021    BUN 46 (H) 11/09/2021     11/09/2021    K 4.3 11/09/2021    CL 96 (L) 11/09/2021    CO2 36 (H) 11/09/2021       Hepatic Function Panel:   Lab Results   Component Value Date    ALKPHOS 348 11/03/2021    ALT 48 11/03/2021    AST 90 11/03/2021    PROT 7.3 11/03/2021    PROT 6.6 07/21/2011    BILITOT <0.2 11/03/2021    BILIDIR <0.2 10/26/2021    IBILI see below 10/26/2021    LABALBU 2.1 11/09/2021       MICRO:  11/3 BC no growth  11/3 Urine cult no growth    10/13 Surg cult  - no growth      10/7 Surg cult: GS 1+WBC, no organism; cult rare Ps aeruginosa, rare mixed skin sukhjinder  Antibiotic Interpretation ISAAC   cefepime Sensitive 8 mcg/mL   ciprofloxacin Resistant >2 mcg/mL   gentamicin Sensitive <=4 mcg/mL   meropenem Sensitive <=1 mcg/mL   piperacillin-tazobactam Sensitive <=16 mcg/mL   tobramycin Sensitive <=4 mcg/mL      10/2 Wound (not know if L or R): light Ps aeruginosa (R cipro), light 2nd E coli, light Enterococcus faecalis  Pseudomonas aeruginosa   Antibiotic Interpretation ISAAC   cefepime Sensitive 8 mcg/mL   ciprofloxacin Resistant >2 mcg/mL   gentamicin Sensitive <=4 mcg/mL   meropenem Sensitive <=1 mcg/mL   piperacillin-tazobactam Sensitive <=16 mcg/mL   tobramycin Sensitive <=4 mcg/mL      Escherichia coli   Antibiotic Interpretation ISAAC   amoxicillin-clavulanate Intermediate 16/8 mcg/mL   ampicillin Resistant >16 mcg/mL   ceFAZolin Resistant >16 mcg/mL   cefepime Sensitive <=2 mcg/mL   cefTRIAXone Sensitive <=1 mcg/mL   cefuroxime Sensitive 8 mcg/mL   ciprofloxacin Resistant >2 mcg/mL   ertapenem Sensitive <=0.5 mcg/mL   gentamicin Sensitive <=4 mcg/mL   meropenem Sensitive <=1 mcg/mL   piperacillin-tazobactam Sensitive <=16 mcg/mL   trimethoprim-sulfamethoxazole Sensitive <=2/38 mcg/mL      Enterococcus  faecalis   Antibiotic Interpretation ISAAC   ampicillin Sensitive <=2 mcg/mL   vancomycin Sensitive 2 mcg/mL         IMAGIN/4 CXR  Impression:   Interval extubation. Stable appearance of the lungs.     11/3 Head CT  'No acute intracranial pathology'     11/3 CXR  1. Endotracheal tube tip just directed at the right mainstem bronchus. Catheter can be withdrawn 1.5 to 2 cm. 2. Multifocal airspace disease      10/3 L foot MRI  Impression   Osteomyelitis involving the fifth metatarsal and fifth proximal phalanx with extensive osseous destruction. Mild osteomyelitis involving the lateral aspect of the cuboid.    Prior partial first digit amputation      10/3 R foot MRI   Impression   Multiple prior amputations. Soft tissue ulceration lateral to the cuboid with mild acute osteomyelitis involving the lateral base of the fourth metatarsal and more distal plantar aspect of the remaining fourth metatarsal shaft as well as the lateral aspect of the cuboid.        Scheduled Meds:   amitriptyline  50 mg Oral Nightly    vancomycin  500 mg IntraVENous Q24H    apixaban  5 mg Oral BID    insulin glargine  18 Units SubCUTAneous Nightly    meropenem  1,000 mg IntraVENous Q12H    insulin lispro  0-18 Units SubCUTAneous TID WC    insulin lispro  0-9 Units SubCUTAneous Nightly    pantoprazole  40 mg Oral QAM AC    furosemide  60 mg IntraVENous BID    sodium chloride flush  5-40 mL IntraVENous 2 times per day    metoprolol succinate  50 mg Oral Daily       Continuous Infusions:   dextrose      sodium chloride 25 mL (11/08/21 1247)       PRN Meds:  oxyCODONE, glucose, dextrose, glucagon (rDNA), dextrose, sodium chloride flush, sodium chloride, ondansetron **OR** ondansetron, polyethylene glycol, acetaminophen **OR** acetaminophen, heparin (porcine), heparin (porcine), labetalol, hydrOXYzine, promethazine      Assessment:     Obesity (BMI 37), DM, neuropathy, HTN, HL, CKD, chronic pain  Hx DM foot infection / surgeries - has R TMA, L hallux resection     L foot wound infection / osteomyelitis   - MRI with 5th MT + prox 5th phalanx destruction, lateral cuboid edema   - OR 10/7 - 5th ray resection; path - 'focal subacute, partially treated osteomyelitis' (L 5th MT)     R foot wound infection / osteomyelitis - limb threatening, possible limb loss / BKA discussed with pt in past   - MRI with cuboid, 4th MT OM   - OR 10/7 - R 4th ray, partial cuboid resection - NOT definitive   - Path with 4th MT acute osteomyelitis, R cuboid 'focal acute osteomyelitis', R cuboid clearance frag 'rare focus of acute osteomyelitis'   - OR 10/13 - L I&D and closure; R I&D, further cuboid resection, VAC   - Cult polymicrobial with Ps aerug (R cipro), E coli, E faecalis     Narcotic dependence  Encephalopathy      Fever   - risk aspiration, line infection, progression pedal / foot infection (had recommended R amputation), fungemia   - RESOLVED    Plan:     Cont vancomycin, meropenem     Wound care per Podiatry   Reviewed and discussed with Pul  Consider GI to eval, r/o GI ulcer, esophagitis, discussed with Attending    See EBONY    Medical Decision Making:   The following items were considered in medical decision making:  Discussion of patient care with other providers  Reviewed clinical lab tests  Reviewed radiology tests  Reviewed other diagnostic tests/interventions  Independent review of radiologic images  Microbiology cultures and other micro tests reviewed      Discussed with pt, Pul, Hospitalist   Gisele Hoover MD     INFUSION ORDERS:   Vancomycin 500 mg iv daily through 11/26  Meropenem 1 gm iv q 12 through 11/26  - Diagnosis - DM foot osteomyelitis   - First dose given in hospital  - PICC   - Disposition / date discharge  - Check CBC w diff, CMP, ESR, CRP, CK every Mon or Tue - FAX result to [phone]  - Call with antibiotic / infusion issues, [phone]  - Call with any change in status, transfer in or out of a facility or to hospital - [phone]  - No f/u in outpatient ID office necessary

## 2021-11-09 NOTE — PROGRESS NOTES
Occupational Therapy/Physical Therapy  Referrals received, chart reviewed, discussed with nurse. Nurse advises to hold therapy at this time secondary to decreased O2 sats (low 80s). Will follow up later today as schedule permits. SOLANGE Giordano, OTR/L [address]

## 2021-11-09 NOTE — PROGRESS NOTES
Attempted to assist patient to sit on side of the bed, pt able to sit up but refusing to sit on the side due to being \"hot and nauseous. \"

## 2021-11-09 NOTE — PROGRESS NOTES
Clinical Pharmacy Progress Note    Vancomycin - Management by Pharmacy    Consult Date(s): 11/3/21  Consulting Provider(s): Dr Claudio Bro / Plan  1)  B/L foot infection / osteomyelitis - Vancomycin   Concurrent Antimicrobials: Meropenem   Day of Vanc + Meropenem therapy:  #39   Current Dosing Method:  Trough based dosing  o Therapeutic Goal: Trough ~15 mcg/mL  o Patient has been on 750mg IV q24h. SCr increased acutely 11/5, so scheduled regimen was stopped and intermittent dosing was started. o Renal function is stable today. Continue 500 mg IV q24h. o Will order repeat level with AM labs tomorrow.  o If discharged, recommend 500 mg IV q24h.  Will continue to monitor clinical condition and make adjustments to regimen as appropriate. Please call with any questions. Sharon Alarcon PharmD, BCPS  Wireless: [id number]  Main pharmacy: [id number]  11/9/2021 9:43 AM      Interval update:   No acute events overnight. Patient requiring 15L O2 HFNC (baseline 3L O2). Subjective/Objective: Ms. Tay Baum is a 60 y.o. female with a PMHx significant for osteomyelitis on vanc/meropenem in [place], CKD4, DM, narcotic dependent chronic pain, admitted for AMS which responded to Narcan. In the ED, patient became combative and went into respiratory distress requiring intubation. Per the ED, patient also vomited several times and there is concern for new aspiration PNA. Pharmacy has been consulted to continue patient's vancomycin dosing.     Ht Readings from Last 1 Encounters:   11/08/21 5' (1.524 m)      Wt Readings from Last 1 Encounters:   11/09/21 179 lb 10.8 oz (81.5 kg)       Vancomycin Level(s) / Doses:    Date Time Dose Level / Type of Level Interpretation   11/6 05:14 750mg q24h Random = 23.0mcg/mL Drawn ~19 hr after prior dose given, predicts  and trough 23.8   11/7 03:07  Random = 13.2mcg/mL Intermittent dosing   11/8 05:29  Random = 13.9 mcg/mL Will schedule 500 mg IV q24h          Note: Serum levels collected for AUC-based dosing may be high if collected in close proximity to the dose administered. This is not necessarily an indicator of toxicity. Cultures & Sensitivities:    Date Site Micro Susceptibility / Result   11/3 Blood x2 No growth to date    11/4 Urine No growth      Labs / Ancillary Data:    Estimated Creatinine Clearance: 34 mL/min (A) (based on SCr of 1.7 mg/dL (H)).     Recent Labs     11/07/21  0307 11/08/21  0529 11/09/21  0640   CREATININE 2.2* 1.7* 1.7*   BUN 57* 50* 46*   WBC 8.3 6.7 6.0

## 2021-11-09 NOTE — CARE COORDINATION
FAY spoke with Summer Antoine at [facility]. They have had patient for SNF in the past.  They are looking at clinicals and calling patient's daughter to discuss possible placement. FAY spoke with admissions at [facility], they are reviewing. CM faxed referral to [facility]. FAY spoke with Lexi Welsh at [facility]. She has said no, but will talk to her team about taking a look at the referral again. CM spoke with patient at bedside. Patient states she is not going back to [facility]. CM explained to patient that she will need to go to a facility and be agreeable due to need for IV abx and is on oxygen and will need someone to manage her medications.     Emeka Burkett RN, BSN,   4th Floor Progressive Care Unit  [phone]

## 2021-11-09 NOTE — PROGRESS NOTES
Podiatric Surgery Daily Progress Note  Yovany Burris      Subjective :   Patient seen and examined this am at the bedside. Patient denies any acute overnight events. Patient denies N/V/F/C/SOB. Patient denies calf pain, thigh pain, chest pain. Review of Systems: A 12 point review of symptoms is unremarkable with the exception of the chief complaint. Patient specifically denies nausea, fever, vomiting, chills, shortness of breath, chest pain, abdominal pain, constipation or difficulty urinating. Objective     BP (!) 120/50   Pulse 64   Temp 97.4 °F (36.3 °C) (Axillary)   Resp 18   Ht 5' (1.524 m)   Wt 190 lb 14.7 oz (86.6 kg)   LMP 10/23/2015   SpO2 93%   BMI 37.29 kg/m²      I/O:    Intake/Output Summary (Last 24 hours) at 11/9/2021 0552  Last data filed at 11/8/2021 2300  Gross per 24 hour   Intake 785 ml   Output 1950 ml   Net -1165 ml              Wt Readings from Last 3 Encounters:   11/08/21 190 lb 14.7 oz (86.6 kg)   10/30/21 200 lb 13.4 oz (91.1 kg)   10/20/21 223 lb 1.7 oz (101.2 kg)       LABS:    Recent Labs     11/07/21  0307 11/08/21  0529   WBC 8.3 6.7   HGB 7.8* 7.8*   HCT 23.8* 23.9*    382        Recent Labs     11/08/21  0529      K 4.1   CL 98*   CO2 33*   PHOS 4.0   BUN 50*   CREATININE 1.7*        No results for input(s): PROT, INR, APTT in the last 72 hours. LOWER EXTREMITY EXAMINATION    Dressing to bilateral LE intact. No strikethrough noted to the external dressing. Scant sanguinous drainage noted to the internal layers of the dressing to the left lower extremity. VASCULAR: DP and PT pulses nonpalpable. Upon hand-held Doppler examination, biphasic signals noted to DP and PT left lower extremity. CFT is brisk to the digits of the foot left lower extremity. Skin temperature is warm to cool from proximal to distal with no focal calor noted. Nonpitting edema noted bilateral lower extremity.  No pain with calf compression left lower extremity.     NEUROLOGIC:  Gross and epicritic sensation diminished Left. Protective sensation absent at all pedal sites Left.     DERMATOLOGIC: Diffuse dermatologic changes noted Left lower extremity.     Right lower extremity:  Wound vac noted to right lower extremity, left clean, dry, and intact. Excellent seal noted with suction set to 125 mmHg. Scant sanginous drainage noted to cannister. Dressing to right lower extremity left clean, dry, and intact. No strikethrough noted to external dressing.     Left lower extremity:    Surgical incision noted to the lateral aspect of the left foot with skin edges coapted. Diffuse xerosis noted periincisional.  Mild areas of dehiscence noted towards the mid-distal aspect of the incision. No fluctuance, crepitus, erythema, drainage, or malodor noted. Full-thickness ulceration noted to the plantar aspect of the fourth metatarsal head with fibrotic base and macerated rim. Measures 2.4 cm x 2.1 cm x 0.1 cm. No fluctuance, crepitus, erythema, malodor, or drainage noted. Wound does not probe to bone, tunnel, or track. MUSCULOSKELETAL: Muscle strength 4/5 for all pedal compartments tested. No pain with palpation of the foot or ankle left lower extremity. Ankle joint ROM is decreased in dorsiflexion with the knee extended. History of hallux amputation of fifth ray resection left foot. History of transmetatarsal amputation with fourth and fifth ray resections right foot.     ASSESSMENT/PLAN  -S/p I&D, partial cuboid resection of right foot, I&D with delayed primary closure of left foot 10/13/2021  -Diabetic foot ulceration, right foot, Carpenter 3  -Diabetic foot ulceration, left foot, Carpenter 1  -Peripheral vascular disease, bilateral lower extremity  -Edema, bilateral lower extremity  -Type 2 diabetes mellitus with peripheral neuropathy  -History of osteomyelitis, bilateral lower extremity  -History of multiple pedal amputations, bilateral lower extremity  -History of

## 2021-11-09 NOTE — PROGRESS NOTES
Pulmonary & Critical Care Medicine    Admit Date: 11/3/2021  PCP: Jorge Weeks MD    CC:  Hypoxia   Events of Last 24 hours:   She is alert and answering questions. She has nausea and some abdominal pain. There is no SOB  At the time of evaluation she was on 15 liters O2 with sats of 89. I tried her on 6 liters, O2 sats were 87-89. I kept her on 8 liters with sats of of 89-91%  There is no increase in cough or sputum production. Vitals:  Tmax:  VITALS:  BP (!) 121/45   Pulse 71   Temp 98.3 °F (36.8 °C) (Oral)   Resp 18   Ht 5' (1.524 m)   Wt 179 lb 10.8 oz (81.5 kg)   LMP 10/23/2015   SpO2 91%   BMI 35.09 kg/m²   24HR INTAKE/OUTPUT:      Intake/Output Summary (Last 24 hours) at 2021 1433  Last data filed at 2021 1347  Gross per 24 hour   Intake 905 ml   Output 1350 ml   Net -445 ml     CURRENT PULSE OXIMETRY:  SpO2: 91 %  24HR PULSE OXIMETRY RANGE:  SpO2  Av.9 %  Min: 91 %  Max: 98 %    EXAM:  General: No distress. Alert. Eyes: PERRL. No sclera icterus. No conjunctival injection. ENT: No discharge. Moist oral mucosa   Neck: Trachea midline. Neck is supple   Resp: No accessory muscle use. Scattered rales   CV: Regular rate. Regular rhythm. No mumur or rub. Trace edema    GI: Non-tender. Non-distended. Normal bowel sounds. Neuro: Awake. Speech is clear. Psych:  No anxiety or agitation.      Medications:    IV:   furosemide (LASIX) 1mg/ml infusion 10 mg/hr (21 1347)    dextrose      sodium chloride 25 mL (21 1247)         Scheduled Meds:   amitriptyline  50 mg Oral Nightly    pantoprazole  40 mg Oral BID AC    vancomycin  500 mg IntraVENous Q24H    apixaban  5 mg Oral BID    insulin glargine  18 Units SubCUTAneous Nightly    meropenem  1,000 mg IntraVENous Q12H    insulin lispro  0-18 Units SubCUTAneous TID WC    insulin lispro  0-9 Units SubCUTAneous Nightly    sodium chloride flush  5-40 mL IntraVENous 2 times per day    metoprolol succinate

## 2021-11-09 NOTE — CONSULTS
Pharmacy was asked to clarify the following medications:  · Gabapentin = stopped per [facility]  · Amitriptyline = still taking per [facility]  · Methadone = no longer taking  · Was on 140 mg daily per [facility] as of 10/9  · During last admission, patient was weaned down and finally stopped d/t lethargy  · Per [facility], patient was not taking methadone    Please call with any questions.   Ge Damon PharmD, BCPS  Wireless: [id number]  Main pharmacy: [id number]  11/9/2021 1:10 PM

## 2021-11-09 NOTE — PROGRESS NOTES
Physician Progress Note      Arjun Robertson  [facility] #:                  [id number]  :                       1965  ADMIT DATE:       11/3/2021 2:22 PM  [address] [place] DATE:  RESPONDING  PROVIDER #:        Albina Perez          QUERY TEXT:    Patient admitted with Diabetic foot ulceration to right foot. . Per procedure   note dated  documentation of debridement. To accurately reflect the   procedure performed please document if  the deepest depth of tissue removed as   down to and including and laterality: :    The medical record reflects the following:  Risk Factors: 59 y/o female with exciisional debridement  Clinical Indicators:  procedure note: \"Using a sterile #10 blade, the   wound noted to plantar lateral aspect of the foot? was excisionally debrided   down to and including down?to healthy, bleeding tissue margins. Estimated   blood loss less than 2 cc in total. Hemostasis obtained with direct   pressure.-Dressing applied to left lower extremity consisting of?wet-to-dry,   gauze, Kerlix, Ace bandage. \"  Treatment: Excisional debridement  Options provided:  -- Excisional debridement of skin, foot during procedure on  .  -- Excisional debridement of subcutaneous tissue, foot was performed during   procedure on  .  -- Excisional debridement of fascia, foot was performed during procedure on     -- Excisional debridement of muscle, foot was performed during procedure on   .  -- Excisional debridement of bone, foot was performed during procedure on   .  -- Other - I will add my own diagnosis  -- Disagree - Not applicable / Not valid  -- Disagree - Clinically unable to determine / Unknown  -- Refer to Clinical Documentation Reviewer    PROVIDER RESPONSE TEXT:    Excisional debridement of subcutaneous tissue of    Query created by: Dimple Knight on 2021 12:22 PM      Electronically signed by:  Albina Perez 2021 1:11 PM

## 2021-11-09 NOTE — CONSULTS
[address] [place] [address]  GI Consultation      Patient: Adrianne Reyna  : 1965       Date:  2021    Subjective:       History of Present Illness  Patient is a 60 y.o.  female admitted with Acute encephalopathy [G93.40]  Acute hypoxemic respiratory failure ([address] [J96.01] who is seen in consult for abdominal pain. Patient was too lethargic during interview to provide a full history. Per chart review, she has been complaining of constant, non-radiating, generalized abdominal pain. Pain is not worse with food. She reports nausea but has not vomited. She denies any diarrhea or constipation but is unsure when her last BM was (per chart appears to have been 2 days ago). She denies any hematochezia or melena. She reports she has been having abdominal pain intermittently for weeks now.     Past Medical History:   Diagnosis Date    Asthma 2004    Bacterial vaginosis 2008    Carpal tunnel syndrome 2007    COPD (chronic obstructive pulmonary disease) ([address]     Diabetes mellitus type II 2007    10/1/20 pt states does accucheck 2x/day at home    Diabetic neuropathy ([address]     Diastolic CHF ([address]     DVT (deep venous thrombosis) ([address] 2004    Dyslipidemia 2009    Dyspareunia 2009    ETOH abuse 2007    HTN (hypertension)     Hx of blood clots     Hyperlipidemia     MRSA (methicillin resistant staph aureus) culture positive 2017; 2017    foot; leg     Neuropathy 2009    polyneuropathy    Pancreatitis 2004    Tobacco abuse 2008    Uses wheelchair     also uses walker      Past Surgical History:   Procedure Laterality Date     SECTION  unknown    FOOT DEBRIDEMENT Right 2019    INCISION AND DRAINAGE WITH APPLICATION OF STRAVIX GRAFT RIGHT FOOT performed by Lazarus Sayers, DPM at [address] Right 2019    RIGHT FOOT INCISION AND DRAINAGE WITH STAGING TRANSMETATARSAL AMPUTATION performed by Candelario Adams Cullison Gricelda at [address] Right 7/5/2019    RIGHT FOOT DEBRIDEMENT INCISION AND DRAINAGE, OPEN DIABETIC FOOT ULCER WITH GRAFT PLACEMENT performed by Emerson Fraser DPM at [address] Left 10/23/2019    LEFT FOOT INCISION AND DRAINAGE , DEBRIDEMENT OF OPEN WOUND, APPLICATION OF STRAVIX GRAFT performed by Emerson Fraser DPM at [address] Right 3/29/2021    INCISION AND DRAINAGE, DEBRIDEMENT OF DIABETIC WOUND WITH PLACEMENT OF STRAVIX GRAFT RIGHT FOOT performed by Emerson Fraser DPM at [address]  10/7/2021    BILATERAL LOWER EXTREMITY INCISION AND DRAINAGE, RIGHT FOOT 4TH RAY RESECTION, LEFT FOOT 5TH RAY RESECTION performed by Emerson Fraser DPM at [address] Bilateral 10/13/2021    REPEAT INCISION AND DRAINAGE OF ALL NONVIABLE SOFT TISSUE AND BONE/ PARTIAL CUBOID RESECTION/ BONY BIOPSY AS NEEDED/ WOUND VAC RIGHT LOWER EXTREMITY performed by Emerson Fraser DPM at [address] IR TUNNELED [address] 5 YEARS  10/5/2021    IR TUNNELED CATHETER PLACEMENT GREATER THAN 5 YEARS 10/5/2021 [facility] SPECIAL PROCEDURES    KNEE SURGERY Left     from falling off ladder -- has screws in place pt report    OTHER SURGICAL HISTORY Left 05/25/2016    I & D left foot    OTHER SURGICAL HISTORY Right 10/20/2017    RIGHT GASTROC LENGTHENING ENDOSCOPIC, INJECTION OF AMNI GRAFT    OTHER SURGICAL HISTORY Right 04/26/2018    Diabetic foot ulcer I&D w/ integra graft application    [place] DEBRIDEMENT, SKIN, SUB-Q TISSUE,MUSCLE,BONE,=<20 SQ CM Right 8/17/2018    RIGHT FOOT DEBRIDEMENT INCISION AND DRAINAGE, PARTIAL 5TH RAY AMPUTATION performed by Emerson Fraser DPM at [address] PRE-MALIGNANT / [address]  7/5378    cryotherapy done on lesion    TOE AMPUTATION Left 02/24/2017    AMPUTATION LEFT GREAT TOE                 TONSILLECTOMY        Past Endoscopic History   No record of prior colonoscopy or endoscopy. Admission Meds  No current facility-administered medications on file prior to encounter. Current Outpatient Medications on File Prior to Encounter   Medication Sig Dispense Refill    acetaminophen (TYLENOL) 325 MG tablet Take 650 mg by mouth every 4 hours as needed for Pain or Fever (Mild pain, Temp >101 F)      atorvastatin (LIPITOR) 40 MG tablet Take 40 mg by mouth nightly      furosemide (LASIX) 40 MG tablet Take 40 mg by mouth 2 times daily      guaiFENesin-dextromethorphan (ROBITUSSIN DM) 100-10 MG/5ML syrup Take 10 mLs by mouth every 4 hours as needed for Cough      magnesium hydroxide (MILK OF MAGNESIA) 400 MG/5ML suspension Take 30 mLs by mouth daily as needed for Constipation      oxyCODONE (ROXICODONE) 5 MG immediate release tablet Take 5 mg by mouth every 8 hours as needed for Pain.       bismuth subsalicylate (PEPTO BISMOL) 262 MG/15ML suspension Take 30 mLs by mouth every 3-4 hours as needed for Indigestion or Diarrhea May take q3h PRN for diarrhea or q4h PRN for indigestion      Multiple Vitamins-Minerals (THERAPEUTIC MULTIVITAMIN-MINERALS) tablet Take 1 tablet by mouth daily      insulin glargine (LANTUS SOLOSTAR) 100 UNIT/ML injection pen Inject 7 Units into the skin nightly 5 pen 0    glucagon 1 MG injection Inject 1 kit into the muscle as needed (low blood sugar)      aspirin 81 MG chewable tablet Take 81 mg by mouth daily       omeprazole (PRILOSEC) 20 MG delayed release capsule Take 20 mg by mouth daily      ammonium lactate (LAC-HYDRIN) 12 % lotion Apply topically nightly Apply to both legs topically at bedtime for itching      insulin aspart (NOVOLOG FLEXPEN) 100 UNIT/ML injection pen Inject 8 Units into the skin 3 times daily (before meals)      vancomycin (VANCOCIN) 750 MG injection Infuse 750 mg intravenously daily      meropenem (MERREM) 1 g injection Infuse 1,000 mg intravenously every 12 hours       hydrALAZINE (APRESOLINE) 50 MG tablet Take 1 tablet by mouth every 8 hours 90 tablet 3    metoprolol succinate (TOPROL XL) 50 MG extended release tablet Take 1 tablet by mouth daily 30 tablet 3    ELIQUIS 5 MG TABS tablet TAKE 1 TABLET BY MOUTH TWICE DAILY 180 tablet 1    escitalopram (LEXAPRO) 10 MG tablet Take 1 tablet by mouth daily 30 tablet 2    amitriptyline (ELAVIL) 100 MG tablet TAKE 1 TABLET BY MOUTH EVERY NIGHT AT BEDTIME 30 tablet 2    nystatin (MYCOSTATIN) 015447 UNIT/GM powder Apply topically 2 times daily Apply to right breast and groin area BID 30 g 3    Insulin Pen Needle 31G X 5 MM MISC 1 each by Does not apply route daily 100 each 5    blood glucose test strips (TRUE METRIX BLOOD GLUCOSE TEST) strip 1 each by In Vitro route 2 times daily As needed. 200 each 5    Lancets MISC 1 each by Does not apply route 2 times daily PHARMACY MAY SUBSTITUTE TO TRUE METRIX LANCETS 100 each 5       Patient denies ASA, NSAID use. Allergies  Allergies   Allergen Reactions    Sulfa Antibiotics Hives, Itching and Rash      Social   Social History     Tobacco Use    Smoking status: Former Smoker     Packs/day: 1.00     Years: 30.00     Pack years: 30.00     Types: Cigarettes     Quit date: 6/1/2018     Years since quitting: 3.4    Smokeless tobacco: Never Used    Tobacco comment: PER PT USES A NICOTINE VAPE NOW AND NO CIGARETTES   Substance Use Topics    Alcohol use: No     Alcohol/week: 4.0 - 5.0 standard drinks     Types: 4 - 5 Standard drinks or equivalent per week     Comment: hx of etoh abuse, denies recent etoh use; last drink 2 years ago        History reviewed. No pertinent family history. No family history of colon cancer, Crohn's disease, or ulcerative colitis. Review of Systems  Pertinent items are noted in HPI.        Physical Exam    BP (!) 121/45   Pulse 71   Temp 98.3 °F (36.8 °C) (Oral)   Resp 18   Ht 5' (1.524 m)   Wt 179 lb 10.8 oz (81.5 kg)   LMP 10/23/2015   SpO2 91%   BMI 35.09 kg/m²   General appearance: alert, cooperative, anemia  4. NPO at midnight    Thank you for the opportunity to participate in [address]

## 2021-11-10 NOTE — PROCEDURES
[address] [place] [address]  Endoscopy Note    Patient: Amie Rinaldi  : 1965  Acct#:     Procedure: Esophagogastroduodenoscopy with biopsy    Date:  11/10/2021     Surgeon:  Gentry Johnston MD      Anesthesia:    IV propofol, per anesthesia       EBL: <50 mL    Indications: Left upper quadrant abdominal pain, anemia    Description of Procedure:    Informed consent was obtained from the patient after explanation of indications, benefits and possible risks and complications of the procedure. The patient was then taken to the endoscopy suite, placed in the left lateral decubitus position and the above IV sedation was administrered. The Olympus videoendoscope (GIF-H190) was placed in the patient's mouth and under direct visualization passed into the esophagus. The scope was then advanced into the stomach and to the second portion of the duodenum. A retroflexed exam of the gastric cardia and fundus was performed. The scope was then withdrawn back into the stomach, it was decompressed, and the scope was completely withdrawn. Findings:  Normal first and second portion of the duodenum. Biopsies were obtained evaluate for celiac disease. Moderate striped erythema in the gastric antrum and body. Biopsies were obtained evaluate for H. pylori. Normal esophagus. No esophagitis or Lopez's. The patient tolerated the procedure well and was taken to the post anesthesia care unit in good condition. Biopsies: Yes      Impression:    1. Normal first and second portion of the duodenum. Biopsies were obtained evaluate for celiac disease. 2. Moderate striped erythema in the gastric antrum and body. Biopsies were obtained evaluate for H. pylori. 3. Normal esophagus. No esophagitis or Lopez's. Recommendations:   1. Await biopsy results  2.   PPI      Gentry Johnston MD  [facility] [place] [facility] [place]

## 2021-11-10 NOTE — PROGRESS NOTES
Occupational Therapy/Physical Therapy  Chart reviewed. Pt in bed upon entry. Evaluations initiated, but pt declined activity stating she wanted to get her EGD done first.  Will follow up later today as schedule permits. SOLANGE Crane Osteen, [facility]  Umair Sharma Abe Nicholas

## 2021-11-10 NOTE — CONSULTS
Nephrology Consult Note                                                                                                                                                                                                                                                                                                                                                               Office : [phone]     Fax :[phone]              Patient's Name: César Gillette  11:38 PM  2021    Reason for Consult:  Edema   Requesting Physician:  Richard Moran MD      Chief Complaint:  Edema     History of Present Ilness:    César Gillette is a 60 y.o. female with CKD 3   Has hx of CHF and edema   Renal consulted for diuresis   Albumin low     Pt has ac hypoxia   She has been aspirating   extubated      Diuresing well       On abx     Past Medical History:   Diagnosis Date    Asthma 2004    Bacterial vaginosis 2008    Carpal tunnel syndrome 2007    COPD (chronic obstructive pulmonary disease) ([address]     Diabetes mellitus type II 2007    10/1/20 pt states does accucheck 2x/day at home    Diabetic neuropathy ([address]     Diastolic CHF ([address]     [facility]) [facility] 2004    Dyslipidemia 2009    Dyspareunia 2009    ETOH abuse 2007    HTN (hypertension)     Hx of blood clots     Hyperlipidemia     MRSA (methicillin resistant staph aureus) culture positive 2017; 2017    foot; leg     Neuropathy 2009    polyneuropathy    Pancreatitis 2004    Tobacco abuse 2008    Uses wheelchair     also uses walker       Past Surgical History:   Procedure Laterality Date     SECTION  unknown    FOOT DEBRIDEMENT Right 2019    INCISION AND DRAINAGE WITH APPLICATION OF STRAVIX GRAFT RIGHT FOOT performed by Yayo Koroma DPM at [address] Right 2019    RIGHT FOOT INCISION AND DRAINAGE WITH STAGING TRANSMETATARSAL AMPUTATION performed by Krista Santos, RAMONE at [address] [place] [address] Right 7/5/2019    RIGHT FOOT DEBRIDEMENT INCISION AND DRAINAGE, OPEN DIABETIC FOOT ULCER WITH GRAFT PLACEMENT performed by Krista Santos DPM at [address] [place] [address] Left 10/23/2019    LEFT FOOT INCISION AND DRAINAGE , DEBRIDEMENT OF OPEN WOUND, APPLICATION OF STRAVIX GRAFT performed by Krista Santos, RAMONE at [address] [place] [address] Right 3/29/2021    INCISION AND DRAINAGE, DEBRIDEMENT OF DIABETIC WOUND WITH PLACEMENT OF STRAVIX GRAFT RIGHT FOOT performed by Krista Santos DPM at [address] [place] [address]  10/7/2021    BILATERAL LOWER EXTREMITY INCISION AND DRAINAGE, RIGHT FOOT 4TH RAY RESECTION, LEFT FOOT 5TH RAY RESECTION performed by Krista Santos, RAMONE at [address] [place] [address] Bilateral 10/13/2021    REPEAT INCISION AND DRAINAGE OF ALL NONVIABLE SOFT TISSUE AND BONE/ PARTIAL CUBOID RESECTION/ BONY BIOPSY AS NEEDED/ WOUND VAC RIGHT LOWER EXTREMITY performed by Krista Santos DPM at [address] IR TUNNELED [address] 5 YEARS  10/5/2021    IR TUNNELED CATHETER PLACEMENT GREATER THAN 5 YEARS 10/5/2021 [facility] SPECIAL PROCEDURES    KNEE SURGERY Left     from falling off ladder -- has screws in place pt report    OTHER SURGICAL HISTORY Left 05/25/2016    I & D left foot    OTHER SURGICAL HISTORY Right 10/20/2017    RIGHT GASTROC LENGTHENING ENDOSCOPIC, INJECTION OF AMNI GRAFT    OTHER SURGICAL HISTORY Right 04/26/2018    Diabetic foot ulcer I&D w/ integra graft application    [place] DEBRIDEMENT, SKIN, SUB-Q TISSUE,MUSCLE,BONE,=<20 SQ CM Right 8/17/2018    RIGHT FOOT DEBRIDEMENT INCISION AND DRAINAGE, PARTIAL 5TH RAY AMPUTATION performed by Krista Santos DPM at [address] PRE-[place]NT / [facility]  7/7003    cryotherapy done on lesion    TOE AMPUTATION Left 02/24/2017    AMPUTATION LEFT GREAT TOE                 TONSILLECTOMY         History reviewed. No pertinent family history. Ht 5' (1.524 m)   Wt 179 lb 10.8 oz (81.5 kg)   LMP 10/23/2015   SpO2 94%   BMI 35.09 kg/m²   Constitutional:  OAA X3 NAD  Skin: no rash, turgor wnl  Heent:  eomi, mmm  Neck: no bruits or jvd noted  Cardiovascular:  S1, S2 without m/r/g  Respiratory: CTA B without w/r/r  Abdomen:  +bs, soft, nt, nd  Ext: + lower extremity edema  Psychiatric: mood and affect appropriate  Musculoskeletal:  Rom, muscular strength intact    Data:   Labs:  CBC:   Recent Labs     11/07/21 0307 11/08/21  0529 11/09/21  0640   WBC 8.3 6.7 6.0   HGB 7.8* 7.8* 8.5*    382 391     BMP:    Recent Labs     11/07/21 0307 11/08/21  0529 11/09/21  0640   * 140 137   K 4.7 4.1 4.3   CL 97* 98* 96*   CO2 30 33* 36*   BUN 57* 50* 46*   CREATININE 2.2* 1.7* 1.7*   GLUCOSE 125* 178* 276*     Ca/Mg/Phos:   Recent Labs     11/07/21 0307 11/08/21 0529 11/09/21  0640   CALCIUM 8.6 9.0 8.7   MG 2.00 1.90 2.00   PHOS 4.4 4.0 3.6     Hepatic: No results for input(s): AST, ALT, ALB, BILITOT, ALKPHOS in the last 72 hours. Troponin: No results for input(s): TROPONINI in the last 72 hours. BNP: No results for input(s): BNP in the last 72 hours. Lipids: No results for input(s): CHOL, TRIG, HDL, LDLCALC, LABVLDL in the last 72 hours. ABGs: No results for input(s): PHART, PO2ART, KDD5MEV in the last 72 hours. INR: No results for input(s): INR in the last 72 hours. UA:No results for input(s): Windell Carrier, GLUCOSEU, BILIRUBINUR, Maryclare [place], BLOODU, PHUR, PROTEINU, UROBILINOGEN, NITRU, LEUKOCYTESUR, LABMICR, URINETYPE in the last 72 hours. Urine Microscopic: No results for input(s): LABCAST, BACTERIA, COMU, HYALCAST, WBCUA, RBCUA, EPIU in the last 72 hours. Urine Culture: No results for input(s): LABURIN in the last 72 hours. Urine Chemistry: No results for input(s): Breanna Jayy, PROTEINUR, NAUR in the last 72 hours. IMAGING:  XR CHEST PORTABLE   Final Result   1.  Stable chest      XR CHEST PORTABLE   Final Result Impression: Interval extubation. Stable appearance of the lungs. CT Head WO Contrast   Final Result      No acute intracranial pathology                 XR CHEST PORTABLE   Final Result   1. Endotracheal tube tip just directed at the right mainstem bronchus. Catheter can be withdrawn 1.5 to 2 cm. 2. Multifocal airspace disease      Critical finding reported to  Physician: Alberto Guy  at 3:18 PM on 11/3/2021, and he confirmed that the tube has been repositioned after review of the above radiograph           Assessment/Plan   1. CKD 3     2. HTN    3. Anemia    4. Acid- base/ Electrolyte imbalance     5.  Edema     Plan   - change to lasix gtt  - if Cr worsens will change to PO torsemide   - check BNP in am   - Inc nutrition   - Abx   - Labs in am     Recommend to dose adjust all medications  based on renal functions  Maintain SBP> 90 mmHg   Daily weights   AVOID NSAIDs  Avoid Nephrotoxins  Monitor Intake/Output  Call if significant decrease in urine output                 Thank you for allowing us to participate in care of William Aldana MD  Feel free to contact me   [facility] [address]  Office : [phone]  Fax :[phone]

## 2021-11-10 NOTE — PROGRESS NOTES
Physical Therapy/Occupational Therapy  Attempt Note  Pt off floor for EGD at this time. Will follow up 11/11. Dotty Pamla, PT  MARIA VICTORIA.  Taty Mensah, [facility]

## 2021-11-10 NOTE — PLAN OF CARE
Problem: Falls - Risk of:  Goal: Will remain free from falls  Description: Will remain free from falls  Outcome: Ongoing  Note: Bed alarm is on and in lowest position. Pt is strict bedrest. Call light within reach. Bed alarm is on        Problem: Pain:  Goal: Control of chronic pain  Description: Control of chronic pain  Outcome: Ongoing  Note: Pt has PRN oxycodone and tylenol to help with pain. Also encouraged to reposition in bed to help      Problem: Breathing Pattern - Ineffective:  Goal: Ability to achieve and maintain a regular respiratory rate will improve  Description: Ability to achieve and maintain a regular respiratory rate will improve  Outcome: Ongoing  Note: Pt is on 6L HFNC and oxygen saturations have been in the 90s%     Problem: OXYGENATION/RESPIRATORY FUNCTION  Goal: Patient will maintain patent airway  Outcome: Ongoing  Note: Airway remains patent  Goal: Patient will achieve/maintain normal respiratory rate/effort  Description: Respiratory rate and effort will be within normal limits for the patient  Outcome: Ongoing  Note: Resp rate is WNL.  Pt encouraged to use incentive spirometer

## 2021-11-10 NOTE — PROGRESS NOTES
Hospitalist Progress Note      PCP: Dominique Wakefield MD    Date of Admission: 11/3/2021    Chief Complaint: David Emily, unresponsive at nursing home    Hospital Course:   59 y/o F w/ hx if RASHMI, COPD with baseline O2 2-4M, systolic HF, DVT (1544), bilateral lower foot wounds w/ partial amputations, osteomyelitis on vancomycin and meropenem, CKD, DM     Recent admissions  -- 10/01 - 10/16 - progressively worsening b/l lower extremity pain for 2 weeks, OR on 10/7 for bilateral lower extremity I&D with left fifth ray resection, right fourth ray resection, right partial cuboid resection. Surgical path from 10/7 overall showed partially treated osteomyelitis. Patient went back to the OR 10/13 for for repeat I&D with delayed primary closure of right lower extremity and I&D with partial cuboid resection and wound vac application. Patient was hypotensive and difficult to arouse after surgery. on BIPAP with improvement in mental status. Recommended to drop dose of Methadone as likely contributing to her encephalopathy  -- 10/18 - 10/20 --  minimally responsive shortly after receiving her typical 140 mg of methadone in the morning. On arrival of the EMS she was given Narcan with immediate improvement in mental status, she was also noted to have glucose in the 30s and was given glucagon with some improvement. noted to have hypoxia and was placed on nonrebreather oxygen as well as given Haldol and Versed for agitation. Insulin doses were adjusted and dc to facility  -- 10/23 - 11/01 -- due to waxing and wanning mental status. Diuresed with Lasix net -5 L, and discharged on torsemide and stopped methadone  - 11/03 --- lethargic at nursing home, not responding to sternal rub, after receiving 2 doses of narcan, intubated in the ED but extubated once in ICU where requiring NRB    Subjective: Patient is alert oriented x3 on 6 to 7 L of oxygen. Still complaining of some abdominal pain with nausea feeling hungry.   Denies any shortness of breath. No acute event reported overnight. Discussed with nephrology patient appears to be euvolemic her creatinine is trending up. Discussed with pulmonary    Medications:  Reviewed    Infusion Medications    dextrose      sodium chloride 25 mL (11/10/21 1155)     Scheduled Medications    torsemide  40 mg Oral Daily    amitriptyline  50 mg Oral Nightly    pantoprazole  40 mg Oral BID AC    vancomycin  500 mg IntraVENous Q24H    apixaban  5 mg Oral BID    insulin glargine  18 Units SubCUTAneous Nightly    meropenem  1,000 mg IntraVENous Q12H    insulin lispro  0-18 Units SubCUTAneous TID WC    insulin lispro  0-9 Units SubCUTAneous Nightly    sodium chloride flush  5-40 mL IntraVENous 2 times per day    metoprolol succinate  50 mg Oral Daily     PRN Meds: oxyCODONE, glucose, dextrose, glucagon (rDNA), dextrose, sodium chloride flush, sodium chloride, ondansetron **OR** ondansetron, polyethylene glycol, acetaminophen **OR** acetaminophen, labetalol, hydrOXYzine, promethazine      Intake/Output Summary (Last 24 hours) at 11/10/2021 1215  Last data filed at 11/10/2021 1143  Gross per 24 hour   Intake 1330 ml   Output 2625 ml   Net -1295 ml       Physical Exam Performed:    BP (!) 116/52   Pulse 67   Temp 99 °F (37.2 °C) (Oral)   Resp 16   Ht 5' (1.524 m)   Wt 178 lb 5.6 oz (80.9 kg)   LMP 10/23/2015   SpO2 92%   BMI 34.83 kg/m²     Constitutional:  Chronically ill appearing, NAD  HENT: Normocephalic and atraumatic. Extraocular movements intact. Conjunctivae normal.   Cardiovascular: Normal rate and regular rhythm with no murmur/gallops and rubs  Pulmonary: Normal respiratory effort. Bilateral clear to auscultate, no wheeze no rhonchi. Abdominal: No tenderness, soft, BS +ve  Musculoskeletal: BLEs wrapped in ace bandages. Lower extremity with wound VAC  Skin: Skin is warm and dry. Neurological: Alert and oriented x3. Following commands.   Grossly nonfocal  Psychiatric: Mood normal.     Labs:   Recent Labs     11/08/21  0529 11/09/21  0640 11/10/21  0524   WBC 6.7 6.0 6.7   HGB 7.8* 8.5* 8.5*   HCT 23.9* 25.8* 26.1*    391 380     Recent Labs     11/08/21  0529 11/09/21  0640 11/10/21  0524    137 139   K 4.1 4.3 4.7   CL 98* 96* 96*   CO2 33* 36* 34*   BUN 50* 46* 49*   CREATININE 1.7* 1.7* 2.0*   CALCIUM 9.0 8.7 9.0   PHOS 4.0 3.6 4.9     No results for input(s): AST, ALT, BILIDIR, BILITOT, ALKPHOS in the last 72 hours. No results for input(s): INR in the last 72 hours. No results for input(s): [place] in the last 72 hours. Urinalysis:      Lab Results   Component Value Date    NITRU Negative 10/23/2021    WBCUA None seen 10/23/2021    BACTERIA Rare 10/23/2021    RBCUA 0-2 10/23/2021    BLOODU Negative 10/23/2021    SPECGRAV 1.020 10/23/2021    GLUCOSEU 100 10/23/2021       Radiology:  XR CHEST PORTABLE   Final Result   1. Stable chest      XR CHEST PORTABLE   Final Result   Impression: Interval extubation. Stable appearance of the lungs. CT Head WO Contrast   Final Result      No acute intracranial pathology                 XR CHEST PORTABLE   Final Result   1. Endotracheal tube tip just directed at the right mainstem bronchus. Catheter can be withdrawn 1.5 to 2 cm.    2. Multifocal airspace disease      Critical finding reported to  Physician: Emily Kamara  at 3:18 PM on 11/3/2021, and he confirmed that the tube has been repositioned after review of the above radiograph               Assessment/Plan:    Active Hospital Problems    Diagnosis Date Noted    Acute encephalopathy [G93.40] 10/18/2021    Type 2 diabetes mellitus with left diabetic foot infection [facility] [address] [G74.679, L08.9]     Type 2 diabetes mellitus with right diabetic foot infection ([facility] [address] [I00.800, L08.9] 03/12/2021    Chronic osteomyelitis of right foot with draining sinus [facility]) [A04.599] 06/03/2019    Diabetic polyneuropathy associated with type 2 diabetes mellitus ([facility] [E11.42] 03/26/2019    Open wound of right foot [S91.301A] 07/03/2018     #Acute respiratory failure with hypoxemia, intubated in the emergency room, extubated once was in ICU to NRB. Required Vapotherm currently on 8L of oxygen  #Pulmonary edema  Currently 65 L since admission. Weight is 179 pound  Her baseline oxygen is 3-4 L NC  Monitor sats, wean as tolerated to keep sats more than 90%. Discussed with RN   Continue antibiotics broad-spectrum vancomycin and Merrem  Discussed with nephrology patient appears to be euvolemic we will transition to torsemide. #Abdominal pain  GI consult reviewed and appreciated recommended PPI twice daily. Plan for EGD on 11/10    #Acute encephalopathy toxic due to narcotic overdose, improved with Narcan use. She was asked to stop methadone on discharge on 11/01  Pharmacy verified med list with nursing home she was not getting methadone at facility. Avoid narcotics    #NSTEMI type II due to demand ischemia,   EKG trend flat, no EKG changes critical ischemia or infarction    #Chronic DVTs  Was placed on heparin drip on admission  Continue Eliquis    #Diabetic foot ulceration/chronic osteomyelitis  High fever on admission, ID following, continue broad-spectrum antibiotics till 11/26  Right lower extremity with wound VAC   Wound care per podiatry. Okay to DC per podiatry    #FAHEEM on CKD stage IV, on discharge creatinine was 1.6 (11/01)  Creatinine stable at 1.7> 2.0 due to diuresis. Addition to oral torsemide, monitor renal profile daily    #Chronic diastolic congestive heart failure   Improved proBNP actually lower than discharge on 11/1  Continue diuresis    #Type 2 diabetes  Monitor blood glucose, elevated  Continue carb controlled diet and high-dose sliding scale insulin  Continue Lantus  Hypoglycemia protocol    DVT Prophylaxis: Eliquis  Diet: Diet NPO  Code Status: Full Code    PT/OT Eval Status: Consulted    Dispo -inpatient. Likely discharge in 24 to 48 hours.   Will need placement.   Discussed with     This chart was likely completed using voice recognition technology and may contain unintended grammatical , phraseology,and/or punctuation errors    Gely Ching MD  Hospitalist

## 2021-11-10 NOTE — PROGRESS NOTES
Podiatric Surgery Daily Progress Note  Heidy Martínez      Subjective :   Patient seen and examined this am at the bedside. Patient denies any acute overnight events. Patient denies N/V/F/C/SOB. Patient denies calf pain, thigh pain, chest pain. Review of Systems: A 12 point review of symptoms is unremarkable with the exception of the chief complaint. Patient specifically denies nausea, fever, vomiting, chills, shortness of breath, chest pain, abdominal pain, constipation or difficulty urinating. Objective     BP (!) 114/56   Pulse 71   Temp 97.6 °F (36.4 °C) (Axillary)   Resp 18   Ht 5' (1.524 m)   Wt 179 lb 10.8 oz (81.5 kg)   LMP 10/23/2015   SpO2 95%   BMI 35.09 kg/m²      I/O:    Intake/Output Summary (Last 24 hours) at 11/10/2021 0607  Last data filed at 11/9/2021 2037  Gross per 24 hour   Intake 1570 ml   Output 2100 ml   Net -530 ml              Wt Readings from Last 3 Encounters:   11/09/21 179 lb 10.8 oz (81.5 kg)   10/30/21 200 lb 13.4 oz (91.1 kg)   10/20/21 223 lb 1.7 oz (101.2 kg)       LABS:    Recent Labs     11/09/21  0640 11/10/21  0524   WBC 6.0 6.7   HGB 8.5* 8.5*   HCT 25.8* 26.1*    380        Recent Labs     11/09/21  0640      K 4.3   CL 96*   CO2 36*   PHOS 3.6   BUN 46*   CREATININE 1.7*        No results for input(s): PROT, INR, APTT in the last 72 hours. LOWER EXTREMITY EXAMINATION    Dressing to bilateral LE intact. No strikethrough noted to the external dressing. Scant sanguinous drainage noted to the internal layers of the dressing. VASCULAR: DP and PT pulses nonpalpable. Upon hand-held Doppler examination, biphasic signals noted to DP and PT bilateral.  CFT is brisk to the digits of the foot b/l. Skin temperature is warm to cool from proximal to distal with no focal calor noted. Nonpitting edema noted bilateral lower extremity.  No pain with calf compression b/l.     NEUROLOGIC:  Gross and epicritic sensation diminished bilateral.  Protective sensation absent at all pedal sites bilateral.     DERMATOLOGIC: Diffuse dermatologic changes noted bilateral lower extremity.     Right lower extremity:  Surgical incision noted to the lateral aspect distal to the wound with skin edges coapted. No signs of dehiscence noted. No fluctuance, crepitus, erythema, drainage, or malodor noted. Full-thickness ulceration noted to the plantar aspect of the cuboid with mixture of granular and fibrotic base and slightly macerated rim. Measures 4.4 cm x 2.8 cm x 0.8 cm. No fluctuance, crepitus, erythema, malodor, or drainage noted. Wound does not probe to bone, tunnel, or track.     Left lower extremity:  Surgical incision noted to the lateral aspect of the left foot with skin edges coapted. Diffuse xerosis noted periincisional.  Mild areas of dehiscence noted towards the mid-distal aspect of the incision. No fluctuance, crepitus, erythema, drainage, or malodor noted.         Full-thickness ulceration noted to the plantar aspect of the fourth metatarsal head with fibrotic base and macerated rim. Measures 2.4 cm x 2.1 cm x 0.1 cm. No fluctuance, crepitus, erythema, malodor, or drainage noted. Wound does not probe to bone, tunnel, or track. MUSCULOSKELETAL: Muscle strength 4/5 for all pedal compartments tested. No pain with palpation of the foot or ankle b/l. Ankle joint ROM is decreased in dorsiflexion with the knee extended. History of hallux amputation of fifth ray resection left foot. History of transmetatarsal amputation with fourth and fifth ray resections right foot.     ASSESSMENT/PLAN  -S/p I&D, partial cuboid resection of right foot, I&D with delayed primary closure of left foot 10/13/2021  -Diabetic foot ulceration, right foot, Carpenter 3  -Diabetic foot ulceration, left foot, Carpenter 1  -Peripheral vascular disease, bilateral lower extremity  -Edema, bilateral lower extremity  -Type 2 diabetes mellitus with peripheral neuropathy  -History of osteomyelitis, bilateral lower extremity  -History of multiple pedal amputations, bilateral lower extremity  -History of noncompliance with weightbearing status     -Patient examined and evaluated at the bedside   -Hypotensive otherwise VSS on heated high flow cannula. No Leukocytosis noted (WBC 6.7). -Hemoglobin A1c 8.3%  -No imaging obtained 2/2 low concern of foot infection and well known chronic osteomyelitis from prior imaging.  -Dressing applied to left lower extremity consisting of wet-to-dry, gauze, Kerlix, Ace bandage  -Dressing applied to the right lower extremity consisting of wound VAC, gauze, Kerlix, Ace bandage.  -Wound VAC noted to have excellent seal, set to continuous 125 mm of suction.  -Continue antibiotics as previously prescribed per the recommendations of Dr. Zuleyka Johnson, vancomycin and meropenem through 11/26/2021  -Prevalon boots reapplied to bilateral lower extremity. To be applied to bilateral lower extremity at all times of sitting/laying.  -Nonweightbearing to bilateral lower extremity      DISPO: Diabetic foot ulceration, bilateral lower extremity; clinically stable at this time. Continue IV antibiotics per prior ID recommendations. Strict nonweightbearing to bilateral lower extremity, patient will benefit from SNF placement. Patient okay for discharge from podiatric standpoint pending medical clearance.     Discussed assessment and plan with Dr. Parisa Benton DPM.    Belle Ascencio DPM   Podiatric Resident PGY1  Pager [phone] or [employer]  11/10/2021, 6:11 AM

## 2021-11-10 NOTE — PLAN OF CARE
Problem: Falls - Risk of:  Goal: Will remain free from falls  Description: Will remain free from falls  11/10/2021 0938 by Yobany Garcia RN  Note: Pt is a Fall Risk. See Berenicelew Pederson Fall Risk Score. Pt bed in low position and side rails up. Call light and belongings in reach. Pt encouraged to call for assistance. Will continue with hourly rounds for PO intake, pain needs, toileting, and repositioning as needed. Problem: Pain:  Goal: Pain level will decrease  Description: Pain level will decrease  Note: Pt reporting pain in legs, PRN pain meds ordered (see MAR for details). Will cont to monitor and assess pain     Problem: Breathing Pattern - Ineffective:  Goal: Ability to achieve and maintain a regular respiratory rate will improve  Description: Ability to achieve and maintain a regular respiratory rate will improve  11/10/2021 0938 by Yobany Garcia RN  Note: SpO2 >90% on 6L via high flow nasal cannula when patient is at rest. No reports of SOB. Pulm consulted. Will cont to monitor and wean O2 as needed     Problem: Skin Integrity:  Goal: Will show no infection signs and symptoms  Description: Will show no infection signs and symptoms  Note: Pt refusing q2 turns. Purewick in place, no new s/sx of infection noted so far this shift. PICC dressing changed yesterday. Dressing remains clean, dry, & intact. Pt given CHG bath qshift.

## 2021-11-10 NOTE — PROGRESS NOTES
Nephrology  Note                                                                                                                                                                                                                                                                                                                                                               Office : [phone]     Fax :[phone]              Patient's Name: Lachelle Duran  10:27 AM  11/10/2021    Reason for Consult:  Edema   Requesting Physician:  Yasmin Cohen MD      Good diuresis   Cr worse       Past Medical History:   Diagnosis Date    Asthma 2004    Bacterial vaginosis 2008    Carpal tunnel syndrome 2007    [facility] [address]     Diabetes mellitus type II 2007    10/1/20 pt states does accucheck 2x/day at home    Diabetic neuropathy ([address]     Diastolic CHF ([facility] [address]     DVT (deep venous thrombosis) ([facility] [address] 2004    Dyslipidemia 2009    Dyspareunia 2009    ETOH abuse 2007    HTN (hypertension)     Hx of blood clots     Hyperlipidemia     MRSA (methicillin resistant staph aureus) culture positive 2017; 2017    foot; leg     Neuropathy 2009    polyneuropathy    Pancreatitis 2004    Tobacco abuse 2008    Uses wheelchair     also uses walker       Past Surgical History:   Procedure Laterality Date     SECTION  unknown    FOOT DEBRIDEMENT Right 2019    INCISION AND DRAINAGE WITH APPLICATION OF STRAVIX GRAFT RIGHT FOOT performed by Lynne Moreno DPM at [address] [place] [address] Right 2019    RIGHT FOOT INCISION AND DRAINAGE WITH STAGING TRANSMETATARSAL AMPUTATION performed by Lynne Moreno DPM at [address] [place] [address] Right 2019    RIGHT FOOT DEBRIDEMENT INCISION AND DRAINAGE, OPEN DIABETIC FOOT ULCER WITH GRAFT PLACEMENT performed by Lynne Moreno DPM at [address] [place] [address] Left 10/23/2019    LEFT FOOT INCISION AND DRAINAGE , DEBRIDEMENT OF OPEN WOUND, APPLICATION OF STRAVIX GRAFT performed by Corina Marion DPM at [address] Right 3/29/2021    INCISION AND DRAINAGE, DEBRIDEMENT OF DIABETIC WOUND WITH PLACEMENT OF STRAVIX GRAFT RIGHT FOOT performed by Corina Marion DPM at [address]  10/7/2021    BILATERAL LOWER EXTREMITY INCISION AND DRAINAGE, RIGHT FOOT 4TH RAY RESECTION, LEFT FOOT 5TH RAY RESECTION performed by Corina Marion DPM at [address] Bilateral 10/13/2021    REPEAT INCISION AND DRAINAGE OF ALL NONVIABLE SOFT TISSUE AND BONE/ PARTIAL CUBOID RESECTION/ BONY BIOPSY AS NEEDED/ WOUND VAC RIGHT LOWER EXTREMITY performed by Corina Marion DPM at [address] IR TUNNELED [address] 5 YEARS  10/5/2021    IR TUNNELED CATHETER PLACEMENT GREATER THAN 5 YEARS 10/5/2021 [facility] SPECIAL PROCEDURES    KNEE SURGERY Left     from falling off ladder -- has screws in place pt report    OTHER SURGICAL HISTORY Left 05/25/2016    I & D left foot    OTHER SURGICAL HISTORY Right 10/20/2017    RIGHT GASTROC LENGTHENING ENDOSCOPIC, INJECTION OF AMNI GRAFT    OTHER SURGICAL HISTORY Right 04/26/2018    Diabetic foot ulcer I&D w/ integra graft application    VA DEBRIDEMENT, SKIN, SUB-Q TISSUE,MUSCLE,BONE,=<20 SQ CM Right 8/17/2018    RIGHT FOOT DEBRIDEMENT INCISION AND DRAINAGE, PARTIAL 5TH RAY AMPUTATION performed by Corina Marion DPM at [address] PRE-MAL[place]NT / [address]  5/2872    cryotherapy done on lesion    TOE AMPUTATION Left 02/24/2017    AMPUTATION LEFT GREAT TOE                 TONSILLECTOMY         History reviewed. No pertinent family history. reports that she quit smoking about 3 years ago. Her smoking use included cigarettes. She has a 30.00 pack-year smoking history. She has never used smokeless tobacco. She reports that she does not drink alcohol and does not use drugs.     Allergies: Sulfa antibiotics    Current Medications:    torsemide (DEMADEX) tablet 40 mg, Daily  amitriptyline (ELAVIL) tablet 50 mg, Nightly  pantoprazole (PROTONIX) tablet 40 mg, BID AC  vancomycin (VANCOCIN) 500 mg in dextrose 5 % 250 mL IVPB, Q24H  apixaban (ELIQUIS) tablet 5 mg, BID  insulin glargine (LANTUS;BASAGLAR) injection pen 18 Units, Nightly  meropenem (MERREM) 1,000 mg in sodium chloride 0.9 % 100 mL IVPB (mini-bag), Q12H  insulin lispro (1 Unit Dial) 0-18 Units, TID WC  insulin lispro (1 Unit Dial) 0-9 Units, Nightly  oxyCODONE (ROXICODONE) immediate release tablet 5 mg, Q4H PRN  glucose (GLUTOSE) 40 % oral gel 15 g, PRN  dextrose 50 % IV solution, PRN  glucagon (rDNA) injection 1 mg, PRN  dextrose 5 % solution, PRN  sodium chloride flush 0.9 % injection 5-40 mL, 2 times per day  sodium chloride flush 0.9 % injection 5-40 mL, PRN  0.9 % sodium chloride infusion, PRN  ondansetron (ZOFRAN-ODT) disintegrating tablet 4 mg, Q8H PRN   Or  ondansetron (ZOFRAN) injection 4 mg, Q6H PRN  polyethylene glycol (GLYCOLAX) packet 17 g, Daily PRN  acetaminophen (TYLENOL) tablet 650 mg, Q6H PRN   Or  acetaminophen (TYLENOL) suppository 650 mg, Q6H PRN  labetalol (NORMODYNE;TRANDATE) injection 10 mg, Q4H PRN  hydrOXYzine (VISTARIL) capsule 50 mg, Q8H PRN  promethazine (PHENERGAN) tablet 25 mg, Q6H PRN  metoprolol succinate (TOPROL XL) extended release tablet 50 mg, Daily        Review of Systems:   14 point ROS obtained but were negative except mentioned in HPI      Physical exam:     Vitals:  BP (!) 122/53   Pulse 72   Temp 98.9 °F (37.2 °C) (Oral)   Resp 18   Ht 5' (1.524 m)   Wt 178 lb 5.6 oz (80.9 kg)   LMP 10/23/2015   SpO2 93%   BMI 34.83 kg/m²   Constitutional:  OAA X3 NAD  Skin: no rash, turgor wnl  Heent:  eomi, mmm  Neck: no bruits or jvd noted  Cardiovascular:  S1, S2 without m/r/g  Respiratory: CTA B without w/r/r  Abdomen:  +bs, soft, nt, nd  Ext: + lower extremity edema  Psychiatric: mood and affect appropriate  Musculoskeletal:  Rom, muscular strength intact    Data:   Labs:  CBC:   Recent Labs     11/08/21  0529 11/09/21  0640 11/10/21  0524   WBC 6.7 6.0 6.7   HGB 7.8* 8.5* 8.5*    391 380     BMP:    Recent Labs     11/08/21  0529 11/09/21  0640 11/10/21  0524    137 139   K 4.1 4.3 4.7   CL 98* 96* 96*   CO2 33* 36* 34*   BUN 50* 46* 49*   CREATININE 1.7* 1.7* 2.0*   GLUCOSE 178* 276* 285*     Ca/Mg/Phos:   Recent Labs     11/08/21  0529 11/09/21  0640 11/10/21  0524   CALCIUM 9.0 8.7 9.0   MG 1.90 2.00 1.80   PHOS 4.0 3.6 4.9     Hepatic: No results for input(s): AST, ALT, ALB, BILITOT, ALKPHOS in the last 72 hours. Troponin: No results for input(s): TROPONINI in the last 72 hours. BNP: No results for input(s): BNP in the last 72 hours. Lipids: No results for input(s): CHOL, TRIG, HDL, LDLCALC, LABVLDL in the last 72 hours. ABGs: No results for input(s): PHART, PO2ART, BSV6TAE in the last 72 hours. INR: No results for input(s): INR in the last 72 hours. UA:No results for input(s): Stephanie Rosa, GLUCOSEU, BILIRUBINUR, Jerline Villegas, BLOODU, PHUR, PROTEINU, UROBILINOGEN, NITRU, LEUKOCYTESUR, LABMICR, URINETYPE in the last 72 hours. Urine Microscopic: No results for input(s): LABCAST, BACTERIA, COMU, HYALCAST, WBCUA, RBCUA, EPIU in the last 72 hours. Urine Culture: No results for input(s): LABURIN in the last 72 hours. Urine Chemistry: No results for input(s): Lonie Olga, PROTEINUR, NAUR in the last 72 hours. IMAGING:  XR CHEST PORTABLE   Final Result   1. Stable chest      XR CHEST PORTABLE   Final Result   Impression: Interval extubation. Stable appearance of the lungs. CT Head WO Contrast   Final Result      No acute intracranial pathology                 XR CHEST PORTABLE   Final Result   1. Endotracheal tube tip just directed at the right mainstem bronchus. Catheter can be withdrawn 1.5 to 2 cm.    2. Multifocal airspace disease      Critical finding reported to  Physician: Francia Dean  at 3:18 PM on 11/3/2021, and he confirmed that the tube has been repositioned after review of the above radiograph           Assessment/Plan   1. CKD 3     2. HTN    3. Anemia    4. Acid- base/ Electrolyte imbalance     5.  Edema     Plan   - change to torsemide PO   - Aspiration was big issue as pt was lethargic before she was intubated  - check BNP in am   - Inc nutrition   - Abx   - Labs in am     Recommend to dose adjust all medications  based on renal functions  Maintain SBP> 90 mmHg   Daily weights   AVOID NSAIDs  Avoid Nephrotoxins  Monitor Intake/Output  Call if significant decrease in urine output                 Thank you for allowing us to participate in care of Elio Gallagher MD  Feel free to contact me   [facility] [address]  Office : [phone]  Fax :[phone]

## 2021-11-10 NOTE — PROGRESS NOTES
Clinical Pharmacy Progress Note    Vancomycin - Management by Pharmacy    Consult Date(s): 11/3/21  Consulting Provider(s): Dr Didi Mohan / Plan  1)  B/L foot infection / osteomyelitis - Vancomycin   Concurrent Antimicrobials: Meropenem   Day of Vanc + Meropenem therapy:  #40   Current Dosing Method:  Trough based dosing  o Therapeutic Goal: Trough ~15 mcg/mL  o Trough = 16.5 mcg/mL today, therapeutic  o Continue 500 mg IV q24h. o Will order repeat levels when appropriate.  o If discharged, recommend 500 mg IV q24h.  Will continue to monitor clinical condition and make adjustments to regimen as appropriate. Please call with any questions. Viki Womack, PharmD, BCPS  Wireless: [id number]  Main pharmacy: [id number]  11/10/2021 9:53 AM      Interval update:   Plan for EGD today to evaluate LUQ pain and anemia. Subjective/Objective: Ms. Adrianne Reyna is a 60 y.o. female with a PMHx significant for osteomyelitis on vanc/meropenem in [place], CKD4, DM, narcotic dependent chronic pain, admitted for AMS which responded to Narcan. In the ED, patient became combative and went into respiratory distress requiring intubation. Per the ED, patient also vomited several times and there is concern for new aspiration PNA. Pharmacy has been consulted to continue patient's vancomycin dosing.     Ht Readings from Last 1 Encounters:   11/08/21 5' (1.524 m)      Wt Readings from Last 1 Encounters:   11/10/21 178 lb 5.6 oz (80.9 kg)       Vancomycin Level(s) / Doses:    Date Time Dose Level / Type of Level Interpretation   11/6 05:14 750mg q24h Random = 23.0mcg/mL Drawn ~19 hr after prior dose given, predicts  and trough 23.8   11/7 03:07  Random = 13.2mcg/mL Intermittent dosing   11/8 05:29  Random = 13.9 mcg/mL Will schedule 500 mg IV q24h   11/10 08:35 500 mg q24h Trough = 16.5 mcg/mL Continue 500 mg IV q24h   Note: Serum levels collected for AUC-based dosing may be high if collected in close proximity to the dose administered. This is not necessarily an indicator of toxicity. Cultures & Sensitivities:    Date Site Micro Susceptibility / Result   11/3 Blood x2 No growth to date    11/4 Urine No growth      Labs / Ancillary Data:    Estimated Creatinine Clearance: 29 mL/min (A) (based on SCr of 2 mg/dL (H)).     Recent Labs     11/08/21  0529 11/09/21  0640 11/10/21  0524   CREATININE 1.7* 1.7* 2.0*   BUN 50* 46* 49*   WBC 6.7 6.0 6.7

## 2021-11-10 NOTE — PROGRESS NOTES
Pulmonary & Critical Care Medicine    Admit Date: 11/3/2021  PCP: Tyson Cottrell MD    CC:  Hypoxia   Events of Last 24 hours:   Down to 6 liters O2 with sats of 90%. Vitals:  Tmax:  VITALS:  BP (!) 116/52   Pulse 67   Temp 99 °F (37.2 °C) (Oral)   Resp 16   Ht 5' (1.524 m)   Wt 178 lb 5.6 oz (80.9 kg)   LMP 10/23/2015   SpO2 92%   BMI 34.83 kg/m²   24HR INTAKE/OUTPUT:      Intake/Output Summary (Last 24 hours) at 11/10/2021 1331  Last data filed at 11/10/2021 1143  Gross per 24 hour   Intake 1330 ml   Output 2625 ml   Net -1295 ml     CURRENT PULSE OXIMETRY:  SpO2: 92 %  24HR PULSE OXIMETRY RANGE:  SpO2  Av.7 %  Min: 92 %  Max: 96 %    EXAM:  General: No distress. Alert. Eyes: PERRL. No sclera icterus. No conjunctival injection. ENT: No discharge. Moist oral mucosa   Neck: Trachea midline. Neck is supple   Resp: No accessory muscle use. Scattered rales   CV: Regular rate. Regular rhythm. No mumur or rub. Trace edema    GI: Non-tender. Non-distended. Normal bowel sounds. Neuro: Awake. Speech is clear. Psych:  No anxiety or agitation.      Medications:    IV:   dextrose      sodium chloride 25 mL (11/10/21 1155)         Scheduled Meds:   torsemide  40 mg Oral Daily    amitriptyline  50 mg Oral Nightly    pantoprazole  40 mg Oral BID AC    vancomycin  500 mg IntraVENous Q24H    apixaban  5 mg Oral BID    insulin glargine  18 Units SubCUTAneous Nightly    meropenem  1,000 mg IntraVENous Q12H    insulin lispro  0-18 Units SubCUTAneous TID     insulin lispro  0-9 Units SubCUTAneous Nightly    sodium chloride flush  5-40 mL IntraVENous 2 times per day    metoprolol succinate  50 mg Oral Daily         Diet: Diet NPO     Results:  CBC:   Recent Labs     21  0529 21  0640 11/10/21  0524   WBC 6.7 6.0 6.7   HGB 7.8* 8.5* 8.5*   HCT 23.9* 25.8* 26.1*   MCV 86.1 86.1 86.5    391 380     BMP:   Recent Labs     21  0529 21  0640 11/10/21  0524   NA 140 137 139   K 4.1 4.3 4.7   CL 98* 96* 96*   CO2 33* 36* 34*   PHOS 4.0 3.6 4.9   BUN 50* 46* 49*   CREATININE 1.7* 1.7* 2.0*     LIVER PROFILE: No results for input(s): AST, ALT, LIPASE, BILIDIR, BILITOT, ALKPHOS in the last 72 hours. Invalid input(s): AMYLASE,  ALB  PT/INR: No results for input(s): PROTIME, INR in the last 72 hours. APTT:   No results for input(s): APTT in the last 72 hours. UA:No results for input(s): NITRITE, COLORU, PHUR, LABCAST, WBCUA, RBCUA, MUCUS, TRICHOMONAS, YEAST, BACTERIA, CLARITYU, SPECGRAV, LEUKOCYTESUR, UROBILINOGEN, BILIRUBINUR, BLOODU, GLUCOSEU, AMORPHOUS in the last 72 hours. Invalid input(s): Aleida Bourne      Assessment/Plan:  60 y.o. female with     Acute respiratory failure extubated on 11/4. She is normally on 3 liters O2. After extubation she required vapotherm or NRB. She is down to 6 liters O2. FAHEEM:  Creatinine is 2.0-  Leukocytosis - resolved. Osteomyelitis   Hx of DVTs on Eliquis      Diuretics per nephrology   Vanc and merrem per ID  Wean down O2 supplement to keep sats >=90  OOB as tolerated  I will sign off.   Please call with any questions or concerns     Neil Coates MD

## 2021-11-10 NOTE — ANESTHESIA PRE PROCEDURE
Department of Anesthesiology  Preprocedure Note       Name:  Nataliya Borges   Age:  60 y.o.  :  1965                                          MRN:  [id number]         Date:  11/10/2021      Surgeon: Heather Toribio):  Daniel Jacobo MD    Procedure: Procedure(s):  EGD ESOPHAGOGASTRODUODENOSCOPY    Medications prior to admission:   Prior to Admission medications    Medication Sig Start Date End Date Taking? Authorizing Provider   acetaminophen (TYLENOL) 325 MG tablet Take 650 mg by mouth every 4 hours as needed for Pain or Fever (Mild pain, Temp >101 F)   Yes Historical Provider, MD   atorvastatin (LIPITOR) 40 MG tablet Take 40 mg by mouth nightly   Yes Historical Provider, MD   furosemide (LASIX) 40 MG tablet Take 40 mg by mouth 2 times daily   Yes Historical Provider, MD   guaiFENesin-dextromethorphan (ROBITUSSIN DM) 100-10 MG/5ML syrup Take 10 mLs by mouth every 4 hours as needed for Cough   Yes Historical Provider, MD   magnesium hydroxide (MILK OF MAGNESIA) 400 MG/5ML suspension Take 30 mLs by mouth daily as needed for Constipation   Yes Historical Provider, MD   oxyCODONE (ROXICODONE) 5 MG immediate release tablet Take 5 mg by mouth every 8 hours as needed for Pain.    Yes Historical Provider, MD   bismuth subsalicylate (PEPTO BISMOL) 262 MG/15ML suspension Take 30 mLs by mouth every 3-4 hours as needed for Indigestion or Diarrhea May take q3h PRN for diarrhea or q4h PRN for indigestion   Yes Historical Provider, MD   Multiple Vitamins-Minerals (THERAPEUTIC MULTIVITAMIN-MINERALS) tablet Take 1 tablet by mouth daily   Yes Historical Provider, MD   insulin glargine (LANTUS SOLOSTAR) 100 UNIT/ML injection pen Inject 7 Units into the skin nightly 10/31/21  Yes David Estrada MD   glucagon 1 MG injection Inject 1 kit into the muscle as needed (low blood sugar)   Yes Historical Provider, MD   aspirin 81 MG chewable tablet Take 81 mg by mouth daily    Yes Historical Provider, MD   omeprazole (PRILOSEC) 20 MG delayed chloride infusion   IntraVENous Once Mae Estrella MD        amitriptyline (ELAVIL) tablet 50 mg  50 mg Oral Nightly Shaista Johnson MD   50 mg at 11/09/21 2037    pantoprazole (PROTONIX) tablet 40 mg  40 mg Oral BID AC Shaista Johnson MD   40 mg at 11/10/21 0607    vancomycin (VANCOCIN) 500 mg in dextrose 5 % 250 mL IVPB  500 mg IntraVENous Q24H Mariam King MD   Stopped at 11/10/21 1110    apixaban (ELIQUIS) tablet 5 mg  5 mg Oral BID Panda Cedillo MD   5 mg at 11/09/21 2037    insulin glargine (LANTUS;BASAGLAR) injection pen 18 Units  18 Units SubCUTAneous Nightly Panda Cedillo MD   18 Units at 11/09/21 2037    meropenem (MERREM) 1,000 mg in sodium chloride 0.9 % 100 mL IVPB (mini-bag)  1,000 mg IntraVENous Q12H Philly Carter MD   Stopped at 11/10/21 1312    insulin lispro (1 Unit Dial) 0-18 Units  0-18 Units SubCUTAneous TID WC Panda Cedillo MD   3 Units at 11/10/21 1150    insulin lispro (1 Unit Dial) 0-9 Units  0-9 Units SubCUTAneous Nightly Panda Cedillo MD   5 Units at 11/09/21 2038    oxyCODONE (ROXICODONE) immediate release tablet 5 mg  5 mg Oral Q4H PRN Mónica Christianson MD   5 mg at 11/10/21 8543    glucose (GLUTOSE) 40 % oral gel 15 g  15 g Oral PRN Mariam King MD        dextrose 50 % IV solution  12.5 g IntraVENous PRN Mariam King MD        glucagon (rDNA) injection 1 mg  1 mg IntraMUSCular PRN Mariam King MD        dextrose 5 % solution  100 mL/hr IntraVENous PRN Mariam King MD        sodium chloride flush 0.9 % injection 5-40 mL  5-40 mL IntraVENous 2 times per day Mariam King MD   10 mL at 11/10/21 3294    sodium chloride flush 0.9 % injection 5-40 mL  5-40 mL IntraVENous PRN Mariam King MD        0.9 % sodium chloride infusion  25 mL IntraVENous PRN Mariam King  mL/hr at 11/10/21 1155 25 mL at 11/10/21 1155    ondansetron (ZOFRAN-ODT) disintegrating tablet 4 mg  4 mg Oral Q8H PRN Lionel Umaña MD   4 mg at 11/09/21 4071    Or    ondansetron [facility]) injection 4 mg  4 mg IntraVENous Q6H PRN Lionel Umaña MD   4 mg at 11/08/21 8173    polyethylene glycol (GLYCOLAX) packet 17 g  17 g Oral Daily PRN Lionel Umaña MD        acetaminophen (TYLENOL) tablet 650 mg  650 mg Oral Q6H PRN Lionel Umaña MD   650 mg at 11/09/21 1643    Or    acetaminophen (TYLENOL) suppository 650 mg  650 mg Rectal Q6H PRN Lionel Umaña MD   650 mg at 11/04/21 0052    labetalol (NORMODYNE;TRANDATE) injection 10 mg  10 mg IntraVENous Q4H PRN Janece Blizzard, MD   10 mg at 11/03/21 2146    hydrOXYzine (VISTARIL) capsule 50 mg  50 mg Oral Q8H PRN Dee Farias MD   50 mg at 11/07/21 1740    promethazine (PHENERGAN) tablet 25 mg  25 mg Oral Q6H PRN Guy Wong MD   25 mg at 11/09/21 0000    metoprolol succinate (TOPROL XL) extended release tablet 50 mg  50 mg Oral Daily Dee Farias MD   50 mg at 11/10/21 7408     Facility-Administered Medications Ordered in Other Encounters   Medication Dose Route Frequency Provider Last Rate Last Admin    0.9 % sodium chloride infusion   IntraVENous Continuous PRN ALEJANDRO Shaikh - CRNA   New Bag at 11/10/21 1155       Allergies: Allergies   Allergen Reactions    Sulfa Antibiotics Hives, Itching and Rash       Problem List:    Patient Active Problem List   Diagnosis Code    Feet clawing Q66.89    Diabetic neuropathy ([facility] [address] E11.40    Hyperlipidemia E78.5    HTN (hypertension) I10    Amenorrhea N91.2    Dyspareunia [id number]    Neuropathy G62.9    Bacterial vaginosis N76.0, B96.89    Low back pain M54.50    Anomalies of nails Q84.6    Tobacco abuse Z72.0    Carpal tunnel syndrome G56.00    Scalp lesion L98.9    ETOH abuse F10.10    Pseudocyst, pancreas K86.3    Asthma J45.909    Nicotine addiction F17.200    Hypoxia R09.02    Onychomycosis B35.1    Depression F32. A    Anxiety state F41.1    Tinea Z86.14    CKD (chronic kidney disease) stage 4, GFR 15-29 ml/min ([facility]) N18.4    Acute on chronic congestive heart failure ([facility]) I50.9    Acute encephalopathy G93.40    Encephalopathy acute G93.40    Biliary sludge K83.8    Acute hypoxemic respiratory failure ([facility]) J96.01       Past Medical History:        Diagnosis Date    Asthma 2004    Bacterial vaginosis 2008    Carpal tunnel syndrome 2007    COPD (chronic obstructive pulmonary disease) ([facility] [address]     Diabetes mellitus type II 2007    10/1/20 pt states does accucheck 2x/day at home    Diabetic neuropathy ([facility] [address]     Diastolic CHF ([facility] [address]     DVT (deep venous thrombosis) ([facility] [address] 2004    Dyslipidemia 2009    Dyspareunia 2009    ETOH abuse 2007    HTN (hypertension)     Hx of blood clots     Hyperlipidemia     MRSA (methicillin resistant staph aureus) culture positive 2017; 2017    foot; leg     Neuropathy 2009    polyneuropathy    Pancreatitis 2004    Tobacco abuse 2008    Uses wheelchair     also uses walker       Past Surgical History:        Procedure Laterality Date     SECTION  unknown    FOOT DEBRIDEMENT Right 2019    INCISION AND DRAINAGE WITH APPLICATION OF STRAVIX GRAFT RIGHT FOOT performed by Laci Hazel DPM at [address] Right 2019    RIGHT FOOT INCISION AND DRAINAGE WITH STAGING TRANSMETATARSAL AMPUTATION performed by Laci Hazel DPM at [address] Right 2019    RIGHT FOOT DEBRIDEMENT INCISION AND DRAINAGE, OPEN DIABETIC FOOT ULCER WITH GRAFT PLACEMENT performed by Laci Hazel DPM at [address] Left 10/23/2019    LEFT FOOT INCISION AND DRAINAGE , DEBRIDEMENT OF OPEN WOUND, APPLICATION OF STRAVIX GRAFT performed by Laci Hazel DPM at [address] Right 3/29/2021    INCISION AND DRAINAGE, DEBRIDEMENT OF DIABETIC WOUND WITH PLACEMENT OF STRAVIX GRAFT RIGHT FOOT performed by Alyssa Andujar DPM at [address] [facility]  10/7/2021    BILATERAL LOWER EXTREMITY INCISION AND DRAINAGE, RIGHT FOOT 4TH RAY RESECTION, LEFT FOOT 5TH RAY RESECTION performed by Alyssa Andujar DPM at [address] [facility] Bilateral 10/13/2021    REPEAT INCISION AND DRAINAGE OF ALL NONVIABLE SOFT TISSUE AND BONE/ PARTIAL CUBOID RESECTION/ BONY BIOPSY AS NEEDED/ WOUND VAC RIGHT LOWER EXTREMITY performed by Alyssa Andujar DPM at [address] [facility] IR TUNNELED [address] 5 YEARS  10/5/2021    IR TUNNELED CATHETER PLACEMENT GREATER THAN 5 YEARS 10/5/2021 Belia Escalante SPECIAL PROCEDURES    KNEE SURGERY Left     from falling off ladder -- has screws in place pt report    OTHER SURGICAL HISTORY Left 05/25/2016    I & D left foot    OTHER SURGICAL HISTORY Right 10/20/2017    RIGHT GASTROC LENGTHENING ENDOSCOPIC, INJECTION OF AMNI GRAFT    OTHER SURGICAL HISTORY Right 04/26/2018    Diabetic foot ulcer I&D w/ integra graft application    HI DEBRIDEMENT, SKIN, SUB-Q TISSUE,MUSCLE,BONE,=<20 SQ CM Right 8/17/2018    RIGHT FOOT DEBRIDEMENT INCISION AND DRAINAGE, PARTIAL 5TH RAY AMPUTATION performed by Alyssa Andujar DPM at [address] [facility] / [address]  7/7003    cryotherapy done on lesion    TOE AMPUTATION Left 02/24/2017    AMPUTATION LEFT GREAT TOE                 TONSILLECTOMY         Social History:    Social History     Tobacco Use    Smoking status: Former Smoker     Packs/day: 1.00     Years: 30.00     Pack years: 30.00     Types: Cigarettes     Quit date: 6/1/2018     Years since quitting: 3.4    Smokeless tobacco: Never Used    Tobacco comment: PER PT USES A NICOTINE VAPE NOW AND NO CIGARETTES   Substance Use Topics    Alcohol use: No     Alcohol/week: 4.0 - 5.0 standard drinks     Types: 4 - 5 Standard drinks or equivalent per week     Comment: hx of etoh abuse, denies recent etoh use; last drink 2 years ago                                Counseling given: Not Answered  Comment: PER PT USES A NICOTINE VAPE NOW AND NO CIGARETTES      Vital Signs (Current):   Vitals:    11/10/21 1018 11/10/21 1143 11/10/21 1206 11/10/21 1402   BP:  (!) 116/52     Pulse: 72 67 74 73   Resp:  16     Temp:  99 °F (37.2 °C)     TempSrc:  Oral     SpO2: 93% 92% 92%    Weight:       Height:                                                  BP Readings from Last 3 Encounters:   11/10/21 (!) 116/52   11/01/21 120/61   10/20/21 129/82       NPO Status: Time of last liquid consumption: 0000                        Time of last solid consumption: 2300                        Date of last liquid consumption: 11/10/21                        Date of last solid food consumption: 11/09/21    BMI:   Wt Readings from Last 3 Encounters:   11/10/21 178 lb 5.6 oz (80.9 kg)   10/30/21 200 lb 13.4 oz (91.1 kg)   10/20/21 223 lb 1.7 oz (101.2 kg)     Body mass index is 34.83 kg/m².     CBC:   Lab Results   Component Value Date    WBC 6.7 11/10/2021    RBC 3.01 11/10/2021    HGB 8.5 11/10/2021    HCT 26.1 11/10/2021    MCV 86.5 11/10/2021    RDW 15.9 11/10/2021     11/10/2021       CMP:   Lab Results   Component Value Date     11/10/2021    K 4.7 11/10/2021    K 4.7 11/03/2021    CL 96 11/10/2021    CO2 34 11/10/2021    BUN 49 11/10/2021    CREATININE 2.0 11/10/2021    GFRAA 31 11/10/2021    GFRAA 98 10/31/2012    AGRATIO 0.6 11/03/2021    LABGLOM 26 11/10/2021    GLUCOSE 285 11/10/2021    PROT 7.3 11/03/2021    PROT 6.6 07/21/2011    CALCIUM 9.0 11/10/2021    BILITOT <0.2 11/03/2021    ALKPHOS 348 11/03/2021    AST 90 11/03/2021    ALT 48 11/03/2021       POC Tests:   Recent Labs     11/10/21  1126   POCGLU 187*       Coags:   Lab Results   Component Value Date    PROTIME 14.8 11/03/2021    INR 1.30 11/03/2021    APTT 46.1 11/06/2021       HCG (If Applicable):   Lab Results   Component Value Date    PREGTESTUR Negative 06/06/2019        ABGs:   Lab Results   Component Value Date    PHART 7.367 10/27/2021 PO2ART 64.3 10/27/2021    PYO8BIB 60.9 10/27/2021    BXW9VHV 35 10/27/2021    BEART 8.6 10/27/2021    D2NZHBAZ 91 10/27/2021        Type & Screen (If Applicable):  No results found for: LABABO, LABRH    Drug/Infectious Status (If Applicable):  No results found for: HIV, HEPCAB    COVID-19 Screening (If Applicable):   Lab Results   Component Value Date    COVID19 Not Detected 10/24/2021           Anesthesia Evaluation  Patient summary reviewed and Nursing notes reviewed  Airway: Mallampati: II  TM distance: >3 FB   Neck ROM: full  Mouth opening: > = 3 FB Dental: normal exam         Pulmonary:normal exam  breath sounds clear to auscultation  (+) COPD: moderate,  asthma:           Patient did not smoke on day of surgery. Cardiovascular:Negative CV ROS  Exercise tolerance: good (>4 METS),   (+) hypertension: mild, CHF: no interval change,       ECG reviewed  Rhythm: regular  Rate: normal           Beta Blocker:  Dose within 24 Hrs         Neuro/Psych:   (+) neuromuscular disease:, psychiatric history: stable with treatment            GI/Hepatic/Renal:   (+) GERD: well controlled,           Endo/Other:    (+) DiabetesType II DM, well controlled, , .                 Abdominal:       Abdomen: soft. Vascular: negative vascular ROS. Other Findings:             Anesthesia Plan      MAC     ASA 3       Induction: intravenous. MIPS: Postoperative opioids intended and Prophylactic antiemetics administered. Anesthetic plan and risks discussed with patient. Use of blood products discussed with patient whom consented to blood products. Plan discussed with attending and CRNA.     Attending anesthesiologist reviewed and agrees with Preprocedure content              Luh Em DO   11/10/2021

## 2021-11-10 NOTE — PROGRESS NOTES
[address] [place] [address]  GI Progress Note        Segun Leon is a 60 y.o. female patient. 1. Acute hypoxemic respiratory failure ([address]        Admit Date: 11/3/2021    Subjective:       Bárbara Jo-Ann. Patient seen and examined this morning. Still sleepy this morning, reports LUQ abdominal pain, nausea but no vomiting. Hungry. H/H remains stable.        ROS:  Cardiovascular ROS: no chest pain or dyspnea on exertion  Gastrointestinal ROS: LUQ abdominal pain with change in bowel habits, or black or bloody stools  Respiratory ROS: no cough, shortness of breath, or wheezing    Scheduled Meds:   amitriptyline  50 mg Oral Nightly    pantoprazole  40 mg Oral BID AC    vancomycin  500 mg IntraVENous Q24H    apixaban  5 mg Oral BID    insulin glargine  18 Units SubCUTAneous Nightly    meropenem  1,000 mg IntraVENous Q12H    insulin lispro  0-18 Units SubCUTAneous TID WC    insulin lispro  0-9 Units SubCUTAneous Nightly    sodium chloride flush  5-40 mL IntraVENous 2 times per day    metoprolol succinate  50 mg Oral Daily       Continuous Infusions:   furosemide (LASIX) 1mg/ml infusion 10 mg/hr (11/10/21 0832)    dextrose      sodium chloride 25 mL (11/08/21 1247)       PRN Meds:  oxyCODONE, glucose, dextrose, glucagon (rDNA), dextrose, sodium chloride flush, sodium chloride, ondansetron **OR** ondansetron, polyethylene glycol, acetaminophen **OR** acetaminophen, heparin (porcine), heparin (porcine), labetalol, hydrOXYzine, promethazine      Objective:       Patient Vitals for the past 24 hrs:   BP Temp Temp src Pulse Resp SpO2 Weight   11/10/21 0814 (!) 122/53 98.9 °F (37.2 °C) Oral 76 18 94 %    11/10/21 0759      92 %    11/10/21 0617      93 %    11/10/21 0600       178 lb 5.6 oz (80.9 kg)   11/10/21 0455     18 95 %    11/10/21 0404 (!) 114/56 97.6 °F (36.4 °C) Axillary  18 94 %    11/10/21 0211      96 %    11/09/21 2355 (!) 118/41 98.4 °F (36.9 °C) Oral 71 20 94 %  21 2215      94 %    21 2031 (!) 127/45 98.8 °F (37.1 °C) Oral 73 18 94 %    21 1807    71      21 1643    71      21 1450 (!) 112/51 98.6 °F (37 °C) Axillary 69 18 95 %    21 1112 (!) 121/45 98.3 °F (36.8 °C) Oral 71 18 91 %    21 1048    73  91 %    21 0908      93 %    21 0907 (!) 148/55 98.6 °F (37 °C) Oral 69 18 98 %        Exam:  VITALS:  BP (!) 122/53   Pulse 76   Temp 98.9 °F (37.2 °C) (Oral)   Resp 18   Ht 5' (1.524 m)   Wt 178 lb 5.6 oz (80.9 kg)   LMP 10/23/2015   SpO2 94%   BMI 34.83 kg/m²   TEMPERATURE:  Current - Temp: 98.9 °F (37.2 °C); Max - Temp  Av.5 °F (36.9 °C)  Min: 97.6 °F (36.4 °C)  Max: 98.9 °F (37.2 °C)    NAD  General appearance: alert, appears stated age, cooperative and no distress  Head: Normocephalic, without obvious abnormality, atraumatic  Neck: supple, symmetrical, trachea midline and thyroid not enlarged, symmetric, no tenderness/mass/nodules  CVS:  RRR, Nl s1s2  Lungs CTA Bilaterally, normal effort  Abdomen: Mildly distended, soft, tender in epigastric area and LUQ. Bowel sounds normal, no masses or organomegaly. AAOx3, No asterixis or encephalopathy  Extremities: No edema. Recent Labs     21  0529 21  0640 11/10/21  0524   WBC 6.7 6.0 6.7   HGB 7.8* 8.5* 8.5*   HCT 23.9* 25.8* 26.1*   MCV 86.1 86.1 86.5    391 380     Recent Labs     21  0529 21  0640 11/10/21  0524    137 139   K 4.1 4.3 4.7   CL 98* 96* 96*   CO2 33* 36* 34*   PHOS 4.0 3.6 4.9   BUN 50* 46* 49*   CREATININE 1.7* 1.7* 2.0*     No results for input(s): AST, ALT, ALB, BILIDIR, BILITOT, ALKPHOS in the last 72 hours. No results for input(s): LIPASE, AMYLASE in the last 72 hours. No results for input(s): PROT, INR in the last 72 hours. No results for input(s): PTT in the last 72 hours. No results for input(s): OCCULTBLD in the last 72 hours.     Radiology review: No new images to review    Assessment:       Active Problems:    Open wound of right foot    Diabetic polyneuropathy associated with type 2 diabetes mellitus (HCC)    Chronic osteomyelitis of right foot with draining sinus (HCC)    Type 2 diabetes mellitus with right diabetic foot infection (HCC)    Type 2 diabetes mellitus with left diabetic foot infection ([facility] [address]    Acute encephalopathy  Resolved Problems:    * No resolved hospital problems. *      1. LUQ abdominal pain for two weeks  2. Anemia of unclear etiology    Recommendations:       1. EGD today to evualate   3. Continue NPO  3.  Continue PPI BID    Slade Dixon MD  11/10/2021  8:59 AM     Will see and staff this patient with Dr. Ivan Albarado MD.

## 2021-11-11 NOTE — PROGRESS NOTES
NUTRITION NOTE   Admission Date: 11/3/2021     Type and Reason for Visit: Reassess    NUTRITION RECOMMENDATIONS:   1. PO Diet: Continue regular diet   2. ONS: Modify to Boost Pudding QD    NUTRITION ASSESSMENT:  Po intakes vary per EMR. Pt reports poor appetite d/t \"nothing tasting good. \" Pt reports that she does not like Ensure supplements but she is agreeable to trying boost pudding to increase protein intakes for wound healing. Pt c/o intermittent abdominal pain and nausea - not associated with meals per MD.      Patient admitted d/t acute respiratory failure that required pt be intubated this admission. Lethargic, unresponsive at nursing home; multiple wounds requiring multiple I&D. PMH significant for RASHMI, COPD with baseline O2 0-8G, systolic HF, DVT (6281), bilateral lower foot wounds w/ partial amputations, osteomyelitis on vancomycin and meropenem, CKD, DM. MALNUTRITION ASSESSMENT  Context of Malnutrition: Chronic Illness   Malnutrition Status: No malnutrition   Findings of the 6 clinical characteristics of malnutrition (Minimum of 2 out of 6 clinical characteristics is required to make the diagnosis of moderate or severe Protein Calorie Malnutrition based on AND/ASPEN Guidelines):  Energy Intake: Mild decrease in energy intake (comment)   Energy Intake Time: Greater than or equal to 7 days    Weight Loss %: Unable to assess    Weight loss Time: Unable to assess   Body Fat Loss: No significant loss    Body Fat Location: No Significant   Body Muscle Loss: No significant loss    Body Muscle Loss Location: No significant    Fluid Accumulation: Unable to assess    Fluid Accumulation Location: Unable to assess     Strength: Not Performed;  Not Measured     NUTRITION DIAGNOSIS   · Increased nutrient needs related to increase demand for energy/nutrients as evidenced by wounds    [address] and/or Nutrient Delivery:  Continue Current Diet, Modify Oral Nutrition Supplement  Nutrition Education/Counseling:  No recommendation at this time      The patient will still be monitored per nutrition standards of care. Consult dietitian if nutrition interventions essential to patient care is needed.      Dayana Perez, [address] [place] [address], [facility] [address]:  [phone]  Office:  [phone]

## 2021-11-11 NOTE — PROGRESS NOTES
[address] [place] [address]  GI Progress Note        Kashmir Orosco is a 60 y.o. female patient. 1. Acute hypoxemic respiratory failure ([address]        Admit Date: 11/3/2021    Subjective:       Still reporting some intermittent abdominal pain, no associated with meals. Also endorsing nausea. Denies any vomiting, c/d, hematochezia or melena.       ROS:  Cardiovascular ROS: no chest pain or dyspnea on exertion  Gastrointestinal ROS: Reports abdominal pain, denies change in bowel habits, or black or bloody stools  Respiratory ROS: no cough, shortness of breath, or wheezing    Scheduled Meds:   torsemide  40 mg Oral Daily    amitriptyline  50 mg Oral Nightly    pantoprazole  40 mg Oral BID AC    vancomycin  500 mg IntraVENous Q24H    apixaban  5 mg Oral BID    insulin glargine  18 Units SubCUTAneous Nightly    meropenem  1,000 mg IntraVENous Q12H    insulin lispro  0-18 Units SubCUTAneous TID WC    insulin lispro  0-9 Units SubCUTAneous Nightly    sodium chloride flush  5-40 mL IntraVENous 2 times per day    metoprolol succinate  50 mg Oral Daily       Continuous Infusions:   sodium chloride Stopped (11/10/21 1623)    dextrose      sodium chloride 25 mL (11/10/21 1155)       PRN Meds:  oxyCODONE, glucose, dextrose, glucagon (rDNA), dextrose, sodium chloride flush, sodium chloride, ondansetron **OR** ondansetron, polyethylene glycol, acetaminophen **OR** acetaminophen, labetalol, hydrOXYzine, promethazine      Objective:       Patient Vitals for the past 24 hrs:   BP Temp Temp src Pulse Resp SpO2   11/11/21 0729 133/70 98.9 °F (37.2 °C) Oral 69 18 93 %   11/11/21 0255 124/60 98 °F (36.7 °C) Axillary 77 18    11/10/21 2357 (!) 137/57 98.1 °F (36.7 °C) Axillary 79 18    11/10/21 2038 135/65 98.5 °F (36.9 °C) Oral 77 18 90 %   11/10/21 1847    71     11/10/21 1616 (!) 124/50 98.8 °F (37.1 °C) Oral 75 18 90 %   11/10/21 1553 122/75   75 18 91 %   11/10/21 1542 (!) 107/41   71 14 93 %   11/10/21 1535 115/71 97.7 °F (36.5 °C) Temporal 77 14 92 %   11/10/21 1402    73     11/10/21 1206    74  92 %   11/10/21 1143 (!) 116/52 99 °F (37.2 °C) Oral 67 16 92 %   11/10/21 1018    72  93 %       Exam:  VITALS:  /70   Pulse 69   Temp 98.9 °F (37.2 °C) (Oral)   Resp 18   Ht 5' (1.524 m)   Wt 178 lb 5.6 oz (80.9 kg)   LMP 10/23/2015   SpO2 93%   BMI 34.83 kg/m²   TEMPERATURE:  Current - Temp: 98.9 °F (37.2 °C); Max - Temp  Av.4 °F (36.9 °C)  Min: 97.7 °F (36.5 °C)  Max: 99 °F (37.2 °C)    NAD  General appearance: alert, appears stated age, cooperative and no distress  Head: Normocephalic, without obvious abnormality, atraumatic  Neck: supple, symmetrical, trachea midline and thyroid not enlarged, symmetric, no tenderness/mass/nodules  CVS:  RRR, Nl s1s2  Lungs CTA Bilaterally, normal effort  Abdomen: Soft, mildly tender in epigastric area and LLQ, bowel sounds normal, no masses or organomegaly   AAOx3, No asterixis or encephalopathy  Extremities: No edema. Recent Labs     21  0640 11/10/21  0524 21  0549   WBC 6.0 6.7 5.5   HGB 8.5* 8.5* 9.2*   HCT 25.8* 26.1* 28.1*   MCV 86.1 86.5 86.3    380 377     Recent Labs     21  0640 11/10/21  0524 21  0549    139 141   K 4.3 4.7 4.4   CL 96* 96* 99   CO2 36* 34* 31   PHOS 3.6 4.9 4.6   BUN 46* 49* 50*   CREATININE 1.7* 2.0* 1.8*     No results for input(s): AST, ALT, ALB, BILIDIR, BILITOT, ALKPHOS in the last 72 hours. No results for input(s): LIPASE, AMYLASE in the last 72 hours. No results for input(s): PROT, INR in the last 72 hours. No results for input(s): PTT in the last 72 hours. No results for input(s): OCCULTBLD in the last 72 hours.     Radiology review: no new imaging to review    Assessment:       Active Problems:    Open wound of right foot    Diabetic polyneuropathy associated with type 2 diabetes mellitus (HCC)    Chronic osteomyelitis of right foot with draining sinus (HCC)    Type 2 diabetes mellitus with right diabetic foot infection ([facility]    Type 2 diabetes mellitus with left diabetic foot infection ([facility]    Acute encephalopathy  Resolved Problems:    * No resolved hospital problems. *      1. LUQ abdominal pain for two weeks  2. Anemia of unclear etiology  3. EGD showed moderate striped erythema in the gastric antrum and body. Biopsies sent. Recommendations:       1. Continue PPI BID  2. Patient instructed to call for results of biopsy in 7 days  3.  If abdominal pain and nausea symptoms do not improve, consider doing gastric emptying study to evaluate for gastroparesis      Jose Fernandez MD, PGY-1  11/11/2021  8:50 AM     Will present and staff this patient with Dr. Dustin Rao MD.

## 2021-11-11 NOTE — CARE COORDINATION
CM following for  D/c planning:  patient would benefit  from LTACH :  CM called [facility] [phone] , who will review  Patient  Clinicals  Again and  Start  Pre cert:   Cont to follow for  D/c planning and  Needs:    Patient  will have  Wound Vac, PICC line IV atbx and  Oxygen  ( 4 L nc  ) At  D/C      [facility]               [address] [facility] [address], [facility]       Phone: [phone]       Fax: [phone]        Location will depend on bed availability :      CM recv'd call from Podiatry on call to follow up on Wound  Vac  Needs at  D/C:  If patient  Is going to [facility], [facility]  Or  SNF  They will likely not need  [employer] wound vac  Paperwork completed  ,  But  Will follow up and confirm :  Will  Need  Wound vac  Instructions on  EBONY /AVS at  Discharge. Electronically signed by Nakia Morales RN on 11/11/2021 at 11:11 AM         Nakia Morales RN Case Manager  [facility]  [address] [place] [address] Rolly López.   [facility] [address]  [phone]  Fax [phone]

## 2021-11-11 NOTE — PROGRESS NOTES
left lower extremity.     NEUROLOGIC:  Gross and epicritic sensation diminished Left. Protective sensation absent at all pedal sites Left.     DERMATOLOGIC: Diffuse dermatologic changes noted Left lower extremity.     Right lower extremity:  Wound vac noted to right lower extremity, left clean, dry, and intact. Excellent seal noted with suction set to 125 mmHg. Scant sanginous drainage noted to cannister. Dressing to right lower extremity left clean, dry, and intact. No strikethrough noted to external dressing.     Left lower extremity:    Surgical incision noted to the lateral aspect of the left foot with skin edges coapted. Diffuse xerosis noted periincisional.  Mild areas of dehiscence noted towards the mid-distal aspect of the incision. No fluctuance, crepitus, erythema, drainage, or malodor noted. Full-thickness ulceration noted to the plantar aspect of the fourth metatarsal head with fibrotic base and macerated rim. Measures 2.4 cm x 2.1 cm x 0.1 cm. No fluctuance, crepitus, erythema, malodor, or drainage noted. Wound does not probe to bone, tunnel, or track. MUSCULOSKELETAL: Muscle strength 4/5 for all pedal compartments tested. No pain with palpation of the foot or ankle left lower extremity. Ankle joint ROM is decreased in dorsiflexion with the knee extended. History of hallux amputation of fifth ray resection left foot. History of transmetatarsal amputation with fourth and fifth ray resections right foot.     ASSESSMENT/PLAN  -S/p I&D, partial cuboid resection of right foot, I&D with delayed primary closure of left foot 10/13/2021  -Diabetic foot ulceration, right foot, Carpenter 3  -Diabetic foot ulceration, left foot, Carpenter 1  -Peripheral vascular disease, bilateral lower extremity  -Edema, bilateral lower extremity  -Type 2 diabetes mellitus with peripheral neuropathy  -History of osteomyelitis, bilateral lower extremity  -History of multiple pedal amputations, bilateral lower extremity  -History of noncompliance with weightbearing status     -Patient examined and evaluated at the bedside   -Hypotensive, otherwise VSS on heated high flow cannula. No leukocytosis noted (WBC 5.5). -Hemoglobin A1c 8.3%  -No imaging obtained 2/2 low concern of foot infection and well known chronic osteomyelitis from prior imaging.  -Patient provided verbal consent to perform sharp excisional debridement at today's encounter. Using a sterile #10 blade, the wound noted to plantar lateral aspect of the foot was excisionally debrided down to and including down to healthy, bleeding tissue margins. Estimated blood loss less than 2 cc in total. Hemostasis obtained with direct pressure.  -Dressing applied to left lower extremity consisting of wet-to-dry, gauze, Kerlix, Ace bandage  -Dressing to the right lower extremity left clean, dry, and intact with no drainage noted to the outer dressings  -Wound VAC noted to have excellent seal, set to continuous 125 mmHg.  -Continue antibiotics as previously prescribed per the recommendations of Dr. Jose L Ortiz, vancomycin and meropenem through 11/26/2021  -Prevalon boots ordered to room. Applied to bilateral lower extremities  -Nonweightbearing to bilateral lower extremity       DISPO: Diabetic foot ulceration, bilateral lower extremity; clinically stable at this time. Continue IV antibiotics per prior ID recommendations. Strict nonweightbearing to bilateral lower extremity, patient will benefit from SNF placement. Patient okay for discharge from podiatric standpoint pending medical clearance.     Discussed assessment and plan with Dr. Jean Carlos Aguirre DPM.    Nieves Briseno DPM  11/11/21  7:50 AM

## 2021-11-11 NOTE — PROGRESS NOTES
Occupational Therapy   Occupational Therapy Initial Assessment and Treatment  Late Entry for 1115  Date: 2021   Patient Name: Tay Baum  MRN: [id number]     : 1965    Date of Service: 2021    Discharge Recommendations:  Tay Baum scored a 15/24 on the AM-PAC ADL Inpatient form. Current research shows that an AM-PAC score of 17 or less is typically not associated with a discharge to the patient's home setting. Based on the patient's AM-PAC score and their current ADL deficits, it is recommended that the patient have 3-5 sessions per week of Occupational Therapy at d/c to increase the patient's independence. Please see assessment section for further patient specific details. If patient discharges prior to next session this note will serve as a discharge summary. Please see below for the latest assessment towards goals. OT Equipment Recommendations  Equipment Needed: No (defer recommendations to discharge facility)    Assessment   Performance deficits / Impairments: Decreased functional mobility ; Decreased ADL status; Decreased endurance  Assessment: Admitted from SNF after found unresponsive. Pt is NWB BLE. Limited evaluation was performed. Pt declining all options for ADLs. Sat at EOB x 20 minutes w/ Supervision. Will benefit from ongoing OT. Continue per POC. Treatment Diagnosis: impaired ADLs/transfers 2* NWB BLE  Decision Making: Medium Complexity  OT Education: OT Role; Plan of Care  REQUIRES OT FOLLOW UP: Yes  Activity Tolerance  Activity Tolerance: Patient Tolerated treatment well  Activity Tolerance: agreeable to minimal evaluation - declining all ADLs and options for stretching/challenging trunk while seated at [facility]  Safety Devices  Safety Devices in place: Yes  Type of devices: Nurse notified; Left in bed; Call light within reach;  Bed alarm in place (prevlon boots donned)           Patient Diagnosis(es): The primary encounter diagnosis was Acute hypoxemic respiratory failure ([facility] A diagnosis of Chronic osteomyelitis of right foot with draining sinus (HCC) was also pertinent to this visit. has a past medical history of Asthma, Bacterial vaginosis, Carpal tunnel syndrome, COPD (chronic obstructive pulmonary disease) ([facility] Diabetes mellitus type II, Diabetic neuropathy ([facility] Diastolic CHF ([facility] DVT (deep venous thrombosis) ([facility] Dyslipidemia, Dyspareunia, ETOH abuse, HTN (hypertension), Hx of blood clots, Hyperlipidemia, MRSA (methicillin resistant staph aureus) culture positive, Neuropathy, Pancreatitis, Tobacco abuse, and Uses wheelchair. has a past surgical history that includes  section (unknown); pre-malignant / benign skin lesion excision (7003); other surgical history (Left, 2016); Toe amputation (Left, 2017); other surgical history (Right, 10/20/2017); other surgical history (Right, 2018); pr debridement, skin, sub-q tissue,muscle,bone,=<20 sq cm (Right, 2018); Foot Debridement (Right, 2019); Foot Debridement (Right, 2019); Foot Debridement (Right, 2019); Foot Debridement (Left, 10/23/2019); Tonsillectomy; knee surgery (Left); Foot Debridement (Right, 3/29/2021); IR TUNNELED CVC PLACE WO SQ PORT/PUMP > 5 YEARS (10/5/2021); Foot Debridement (10/7/2021); Foot Debridement (Bilateral, 10/13/2021); and Upper gastrointestinal endoscopy (N/A, 11/10/2021). Treatment Diagnosis: impaired ADLs/transfers 2* NWB BLE      Restrictions  Position Activity Restriction  Other position/activity restrictions: Activity as tolerate. NWB bilateral LE    Subjective   General  Chart Reviewed: Yes  Additional Pertinent Hx: Pt admitted 11/3/21 after being found unresponsive at [facility]. Pt was given Narcan x2 then became combative, then respiratory arres, lost pulse, CPR was performed, pt revived and again combative.      Head CT (-) acute      PMH includes: asthma, CTS, COPD, DM, ETOH abuse, HTN, blood clots, MRSA, neuropathy, pancreatitis,

## 2021-11-11 NOTE — PROGRESS NOTES
Patient is refusing CT of her abdomen. Patient states \" I had test done all day yesterday and just want to be done\". Patient understands risk and benefits. MD notified. Unable to reach GI team at this time.

## 2021-11-11 NOTE — PROGRESS NOTES
Physical Therapy    Facility/Department: [address]  Initial Assessment and Treatment    NAME: Kashmir Orosco  : 1965  MRN: [id number]    Date of Service: 2021    Discharge Recommendations:  Kashmir Orosco scored a 8/24 on the AM-PAC short mobility form. Current research shows that an AM-PAC score of 17 or less is typically not associated with a discharge to the patient's home setting. Based on the patient's AM-PAC score and their current functional mobility deficits, it is recommended that the patient have 3-5 sessions per week of Physical Therapy at d/c to increase the patient's independence. Please see assessment section for further patient specific details. PT Equipment Recommendations  Equipment Needed: No    Assessment   Assessment: Pt from SNF for acute encephalopathy. Pt with B heel wounds and NWB B feet per Podiatry. Pt needing encouragement for participation in PT, only agreeing to EOB activity. Pt requiring Mod A overall for bed mobility. Sitting balance is good. Pt would benefit from continued PT to increased initiate lateral transfer training while NWB. Will follow. Treatment Diagnosis: Decreased mobility associcated with acute encephalopathy. Decision Making: Medium Complexity  Patient Education: Role of PT. Pt verbalized understanding, but would benefit from ongoing education/reinforcement. REQUIRES PT FOLLOW UP: Yes         Restrictions  Activity as tolerate. NWB bilateral LE     Vision/Hearing  Vision: Within Functional Limits  Hearing: Within functional limits       Subjective  Chart Reviewed: Yes  Additional Pertinent Hx: Pt to ED 11/3 after being found unresponsive at [facility]. Pt given Narcan at facility and became agitated wtih staff. Pt intubated in ED, but extubated upon arrival to ICU and placed on NRB mask. Head CT (-). Podiatry following for B foot wounds- NWB B feet. EGD on 11/10. Transferred from [facility] to [facility] 11/10.   PMH:  asthma, COPD, DM, neuopathy, CHF, HTN, DVT, ETOH abuse, polysubstance abuse  Diagnosis: Acute Encephalopathy    Subjective  Subjective: Pt found supine in bed. Pt agreeable for PT with max encouragement. \"I think I tried to fight the world when I came in here. \"  Pain Screening  Patient Currently in Pain:  (c/o bilateral hip discomfort -not rated)    Orientation  Within Functional Limits    Social/Functional History  Type of Home: Facility [facility] (reports she was only there for 1 day - strongly stating she does not want to return there))  Additional Comments: has been NWB BLE - reports she did not get OOB there (at [facility]). Reporting \"they were very prejudiced\" - refusing to return to prior faciltiy. Prior to bilateral foot wounds, pt was living with family - (was able to walk up steps to get in house -using w/c inside of home)      Objective  Strength  [facility]  Strength LLE: WFL    Bed mobility  Rolling to Left: Moderate assistance (verbal cues, reaching for rail)  Rolling to Right: Moderate assistance  Supine to Sit: Moderate assistance (HOB raised, verbal cues, assist for upper trunk)  Sit to Supine:  Moderate assistance (to return to bed, bed flat)     Transfers  Sit to Stand: Unable to assess (Pt NWB B Feet)  Bed to Chair: Unable to assess (pt refused)     Ambulation  Ambulation?: No (Pt NWB B feet)     Balance  Sitting - Static: Good (Pt sat EOB x20 min total with supervision)  Sitting - Dynamic: Good     Exercises  Straight Leg Raise: x 10 BLE supine  Heelslides: x10 BLE supine  Ankle Pumps: x10 BLE seated EOB       Plan   Times per week: 2-5  Current Treatment Recommendations: Transfer Training, Gait Training, Functional Mobility Training, Strengthening      Safety Devices  Type of devices: Left in bed, Call light within reach, Bed alarm in place, Nurse notified      AM-PAC Score  AM-PAC Inpatient Mobility Raw Score : 8 (11/11/21 1153)  AM-PAC Inpatient T-Scale Score : 28.52 (11/11/21 1153)  Mobility Inpatient CMS 0-100% Score: 86.62 (11/11/21 1153)  Mobility Inpatient CMS G-Code Modifier : CM (11/11/21 1153)    Goals  Short term goals  Time Frame for Short term goals: Discharge  Short term goal 1: supine <> sit SBA  Short term goal 2: Lateral transfers with no more than Max A  Patient Goals   Patient goals : \"keep my feet. \"       Therapy Time   Individual Concurrent Group Co-treatment   Time In 6590         Time Out 1115         Minutes 54         Timed Code Treatment Minutes:  40  Total Treatment Minutes:  ANNETTA Atwood

## 2021-11-11 NOTE — DISCHARGE SUMMARY
Hospital Medicine Discharge Summary    Patient ID: Diogo Frances      Patient's PCP: Art Soliz MD    Admit Date: 11/3/2021     Discharge Date:   11/11/2021    Admitting Physician: Danni Burris MD     Discharge Physician: Octaviano Stanley MD     Discharge Diagnoses: Active Hospital Problems    Diagnosis Date Noted    Acute encephalopathy [G93.40] 10/18/2021    Type 2 diabetes mellitus with left diabetic foot infection ([facility] [address] [W58.119, L08.9]     Type 2 diabetes mellitus with right diabetic foot infection ([facility] [address] [Z13.584, L08.9] 03/12/2021    Chronic osteomyelitis of right foot with draining sinus [facility]) [M86.471] 06/03/2019    Diabetic polyneuropathy associated with type 2 diabetes mellitus ([facility] [address] [E11.42] 03/26/2019    Open wound of right foot [S91.301A] 07/03/2018       The patient was seen and examined on day of discharge and this discharge summary is in conjunction with any daily progress note from day of discharge. Hospital Course: 59 y/o F w/ hx if RASHMI, COPD with baseline O2 6-0S, systolic HF, DVT (7857), bilateral lower foot wounds w/ partial amputations, osteomyelitis on vancomycin and meropenem, CKD, DM      Recent admissions  -- 10/01 - 10/16 - progressively worsening b/l lower extremity pain for 2 weeks, OR on 10/7 for bilateral lower extremity I&D with left fifth ray resection, right fourth ray resection, right partial cuboid resection.  Surgical path from 10/7 overall showed partially treated osteomyelitis. Patient went back to the OR 10/13 for for repeat I&D with delayed primary closure of right lower extremity and I&D with partial cuboid resection and wound vac application. Patient was hypotensive and difficult to arouse after surgery. on BIPAP with improvement in mental status.  Recommended to drop dose of Methadone as likely contributing to her encephalopathy  -- 10/18 - 10/20 --  minimally responsive shortly after receiving her typical 140 mg of methadone in the morning. On arrival of the EMS she was given Narcan with immediate improvement in mental status, she was also noted to have glucose in the 30s and was given glucagon with some improvement. noted to have hypoxia and was placed on nonrebreather oxygen as well as given Haldol and Versed for agitation. Insulin doses were adjusted and dc to facility  -- 10/23 - 11/01 -- due to waxing and wanning mental status. Diuresed with Lasix net -5 L, and discharged on torsemide and stopped methadone  - 11/03 --- lethargic at nursing home, not responding to sternal rub, after receiving 2 doses of narcan, intubated in the ED but extubated once in ICU where requiring NRB During hospitalization patient received IV diuresis with nephrology consultation. She is back to 4 L of oxygen. Patient was complaining of abdominal pain status post EGD on 11/10 showed normal first and second part of duodenum. Moderate striped erythema in the gastric antrum and body. Seen and examined on day of discharge reports that he still having some abdominal pain mild nausea but denies any vomiting, fever, chills. Tolerating diet. Stable discharged    #Acute on chronic respiratory failure with hypoxemia requiring intubation  #Suspected pulmonary edema received diuresis back to 4 L of oxygen. #Abdominal pain s/p EGD on 11/10 showed moderate striped erythema we will discharge on Protonix 40 twice daily. Follow-up with GI  #Acute toxic encephalopathy suspected due to narcotic use resolved  #NSTEMI type II demand ischemia  #Chronic DVT on anticoagulation with Eliquis  #CKD stage IV  #Diabetes mellitus type 2 resume home medication  #Diabetic foot ulceration/chronic osteomyelitis patient has a wound VAC on right foot. Antibiotic per ID and wound care per podiatry.           Physical Exam Performed:     /70   Pulse 72   Temp 97.8 °F (36.6 °C) (Oral)   Resp 18   Ht 5' (1.524 m)   Wt 178 lb 5.6 oz (80.9 kg)   LMP 10/23/2015   SpO2 93%   BMI 34.83 kg/m²       Constitutional:  Chronically ill appearing, NAD, elderly  HENT: Normocephalic and atraumatic. Extraocular movements intact. Conjunctivae normal.   Cardiovascular: Normal rate and regular rhythm with no murmur/gallops and rubs  Pulmonary: Normal respiratory effort. Bilateral clear to auscultate, no wheeze no rhonchi. Abdominal: No tenderness, soft, BS +ve  Musculoskeletal: BLEs wrapped in ace bandages. Lower extremity with wound VAC  Skin: Skin is warm and dry. Neurological: Alert and oriented x3. Following commands. Grossly nonfocal  Psychiatric: Mood normal.       Labs: For convenience and continuity at follow-up the following most recent labs are provided:      CBC:    Lab Results   Component Value Date    WBC 5.5 11/11/2021    HGB 9.2 11/11/2021    HCT 28.1 11/11/2021     11/11/2021       Renal:    Lab Results   Component Value Date     11/11/2021    K 4.4 11/11/2021    K 4.7 11/03/2021    CL 99 11/11/2021    CO2 31 11/11/2021    BUN 50 11/11/2021    CREATININE 1.8 11/11/2021    CALCIUM 9.1 11/11/2021    PHOS 4.6 11/11/2021         Significant Diagnostic Studies    Radiology:   XR CHEST PORTABLE   Final Result   1. Stable chest      XR CHEST PORTABLE   Final Result   Impression: Interval extubation. Stable appearance of the lungs. CT Head WO Contrast   Final Result      No acute intracranial pathology                 XR CHEST PORTABLE   Final Result   1. Endotracheal tube tip just directed at the right mainstem bronchus. Catheter can be withdrawn 1.5 to 2 cm.    2. Multifocal airspace disease      Critical finding reported to  Physician: Seamus Li  at 3:18 PM on 11/3/2021, and he confirmed that the tube has been repositioned after review of the above radiograph              Consults:     PHARMACY TO DOSE VANCOMYCIN  IP CONSULT TO HOSPITALIST  IP CONSULT TO CRITICAL CARE  IP CONSULT TO INFECTIOUS DISEASES  PHARMACY TO DOSE VANCOMYCIN  IP CONSULT TO PODIATRY  IP CONSULT TO PHARMACY  IP CONSULT TO PALLIATIVE CARE  IP CONSULT TO NEPHROLOGY  IP CONSULT TO PHARMACY  IP CONSULT TO GI    Disposition: SNF    Condition at Discharge: Stable    Discharge Instructions/Follow-up:  PCP, Podiatry, GI    Code Status:  Full Code     Activity: activity as tolerated    Diet: diabetic diet      Discharge Medications:     Current Discharge Medication List           Details   pantoprazole (PROTONIX) 40 MG tablet Take 1 tablet by mouth 2 times daily (before meals)  Qty: 30 tablet, Refills: 1              Details   oxyCODONE (ROXICODONE) 5 MG immediate release tablet Take 1 tablet by mouth every 8 hours as needed for Pain for up to 2 days.   Qty: 2 tablet, Refills: 0    Comments: Reduce doses taken as pain becomes manageable  Associated Diagnoses: Chronic osteomyelitis of right foot with draining sinus (HCC)      torsemide (DEMADEX) 20 MG tablet Take 2 tablets by mouth daily  Qty: 60 tablet, Refills: 0              Details   acetaminophen (TYLENOL) 325 MG tablet Take 650 mg by mouth every 4 hours as needed for Pain or Fever (Mild pain, Temp >101 F)      atorvastatin (LIPITOR) 40 MG tablet Take 40 mg by mouth nightly      guaiFENesin-dextromethorphan (ROBITUSSIN DM) 100-10 MG/5ML syrup Take 10 mLs by mouth every 4 hours as needed for Cough      magnesium hydroxide (MILK OF MAGNESIA) 400 MG/5ML suspension Take 30 mLs by mouth daily as needed for Constipation      bismuth subsalicylate (PEPTO BISMOL) 262 MG/15ML suspension Take 30 mLs by mouth every 3-4 hours as needed for Indigestion or Diarrhea May take q3h PRN for diarrhea or q4h PRN for indigestion      Multiple Vitamins-Minerals (THERAPEUTIC MULTIVITAMIN-MINERALS) tablet Take 1 tablet by mouth daily      insulin glargine (LANTUS SOLOSTAR) 100 UNIT/ML injection pen Inject 7 Units into the skin nightly  Qty: 5 pen, Refills: 0      glucagon 1 MG injection Inject 1 kit into the muscle as needed (low blood sugar)      aspirin 81 MG chewable tablet Take 81 mg by mouth daily       ammonium lactate (LAC-HYDRIN) 12 % lotion Apply topically nightly Apply to both legs topically at bedtime for itching      insulin aspart (NOVOLOG FLEXPEN) 100 UNIT/ML injection pen Inject 8 Units into the skin 3 times daily (before meals)      metoprolol succinate (TOPROL XL) 50 MG extended release tablet Take 1 tablet by mouth daily  Qty: 30 tablet, Refills: 3      ELIQUIS 5 MG TABS tablet TAKE 1 TABLET BY MOUTH TWICE DAILY  Qty: 180 tablet, Refills: 1    Associated Diagnoses: Deep vein thrombosis (DVT) of proximal lower extremity, unspecified chronicity, unspecified laterality (HCC)      escitalopram (LEXAPRO) 10 MG tablet Take 1 tablet by mouth daily  Qty: 30 tablet, Refills: 2    Associated Diagnoses: Depression, unspecified depression type      amitriptyline (ELAVIL) 100 MG tablet TAKE 1 TABLET BY MOUTH EVERY NIGHT AT BEDTIME  Qty: 30 tablet, Refills: 2    Associated Diagnoses: Type 2 diabetes mellitus with diabetic polyneuropathy, with long-term current use of insulin ([address] Diabetic polyneuropathy associated with type 2 diabetes mellitus (HCC)      nystatin (MYCOSTATIN) 866147 UNIT/GM powder Apply topically 2 times daily Apply to right breast and groin area BID  Qty: 30 g, Refills: 3    Associated Diagnoses: Diabetic foot infection (HCC)      Insulin Pen Needle 31G X 5 MM MISC 1 each by Does not apply route daily  Qty: 100 each, Refills: 5    Associated Diagnoses: DM type 2, uncontrolled, with lower extremity ulcer ([address]      blood glucose test strips (TRUE METRIX BLOOD GLUCOSE TEST) strip 1 each by In Vitro route 2 times daily As needed.   Qty: 200 each, Refills: 5    Associated Diagnoses: DM type 2, uncontrolled, with lower extremity ulcer ([address]      Lancets MISC 1 each by Does not apply route 2 times daily PHARMACY MAY SUBSTITUTE TO TRUE METRIX LANCETS  Qty: 100 each, Refills: 5    Associated Diagnoses: DM type 2, uncontrolled, with lower extremity ulcer ([address] Time Spent on discharge is more than 30 minutes in the examination, evaluation, counseling and review of medications and discharge plan. This chart was likely completed using voice recognition technology and may contain unintended grammatical , phraseology,and/or punctuation errors    Signed:    Brenda Bustos MD   11/11/2021      Thank you Rich Nunez MD for the opportunity to be involved in this patient's care. If you have any questions or concerns please feel free to contact me at [phone].

## 2021-11-11 NOTE — PROGRESS NOTES
Occupational Therapy/Physical Therapy  Attempt/Refusal    Orders received, chart reviewed. First attempt, pt with sheets over head and sleeping. Not awakening to verbal stimuli. Second attempt, pt eating breakfast w/ nursing in the room. Pt reporting she was at [facility] \Hospital [facility] a New Name and Outlook.\"" prior to admission and adamantly stating she does not wish to return to this facility. Discussed B NWB status and pt indicating she is aware of this \"I want to keep my feet. \" Declining to sit EOB to eat and pt dismissing therapists so she could use the Purewick catheter. \"How am I supposed to concentrate and go to the bathroom with you or the nurse standing over me? You need to close the door when you leave the room, too. \" Will attempt f/u later today as schedule allows or f/u 11/12.     Kisha HICKEY/L [address], [place]

## 2021-11-11 NOTE — PROGRESS NOTES
Clinical Pharmacy Progress Note    Vancomycin - Management by Pharmacy    Consult Date(s): 11/3/21  Consulting Provider(s): Dr Alex Dorman / Plan  1)  B/L foot infection / osteomyelitis - Vancomycin   Concurrent Antimicrobials: Meropenem   Day of Vanc + Meropenem therapy: To continue through 11/26 per ID   Current Dosing Method:  Trough based dosing  o Therapeutic Goal: Trough ~15 mcg/mL  o Trough 11/10 = 16.5 mcg/mL, therapeutic  o Continue 500 mg IV q24h.  Will continue to monitor clinical condition and make adjustments to regimen as appropriate. Please call with questions--  Thanks--  Oscar Espitia, PharmD, BCPS, BCGP  [id number] ([facility])   11/11/2021 10:22 AM      Interval update:   EGD done yesterday. Discharge planning under way. Subjective/Objective: Ms. Tara Warren is a 60 y.o. female with a PMHx significant for osteomyelitis on vanc/meropenem in NH, CKD4, DM, narcotic dependent chronic pain, admitted for AMS which responded to Narcan. In the ED, patient became combative and went into respiratory distress requiring intubation. Per the ED, patient also vomited several times and there is concern for new aspiration PNA. Pharmacy has been consulted to continue patient's vancomycin dosing. Ht Readings from Last 1 Encounters:   11/08/21 5' (1.524 m)      Wt Readings from Last 1 Encounters:   11/10/21 178 lb 5.6 oz (80.9 kg)       Vancomycin Level(s) / Doses:    Date Time Dose Level / Type of Level Interpretation   11/6 05:14 750mg q24h Random = 23.0mcg/mL Drawn ~19 hr after prior dose given, predicts  and trough 23.8   11/7 03:07  Random = 13.2mcg/mL Intermittent dosing   11/8 05:29  Random = 13.9 mcg/mL Will schedule 500 mg IV q24h   11/10 08:35 500 mg q24h Trough = 16.5 mcg/mL Continue 500 mg IV q24h   Note: Serum levels collected for AUC-based dosing may be high if collected in close proximity to the dose administered.  This is not necessarily an indicator of toxicity. Cultures & Sensitivities:    Date Site Micro Susceptibility / Result   11/3 Blood x2 No growth to date    11/4 Urine No growth      Labs / Ancillary Data:    Estimated Creatinine Clearance: 32 mL/min (A) (based on SCr of 1.8 mg/dL (H)).     Recent Labs     11/09/21  0640 11/10/21  0524 11/11/21  0549   CREATININE 1.7* 2.0* 1.8*   BUN 46* 49* 50*   WBC 6.0 6.7 5.5

## 2021-11-11 NOTE — PROGRESS NOTES
ID Follow-up NOTE    CC:   R DFI / OM. Fever, Leukocytosis  Antibiotics: Vancomycin, Meropenem    Admit Date: 11/3/2021  Hospital Day: 9    Subjective:     11/10 EGD -    C/o nausea with abd pain, constant, no change with food, no emesis  'Pain everywhere', 'I live with this'    Objective:     Patient Vitals for the past 8 hrs:   BP Temp Temp src Pulse Resp SpO2   11/11/21 0729 133/70 98.9 °F (37.2 °C) Oral 69 18 93 %   11/11/21 0255 124/60 98 °F (36.7 °C) Axillary 77 18      I/O last 3 completed shifts: In: 730 [P.O.:580; I.V.:150]  Out: 1350 [Urine:1350]  I/O this shift:  In: 60 [P.O.:60]  Out: -     EXAM:  GENERAL: No apparent distress.   4L  HEENT: Membranes moist, no oral lesion  NECK:  Supple, no lymphadenopathy  LUNGS: Clear b/l, no rales, no dullness  CARDIAC: RRR, no murmur appreciated  ABD:  + BS, soft - tender in LUQ  EXT:  Bilateral pedal dressings  NEURO: No focal neurologic findings  PSYCH: Orientation, sensorium, mood normal  LINES:  R chest line in place       Data Review:  Lab Results   Component Value Date    WBC 5.5 11/11/2021    HGB 9.2 (L) 11/11/2021    HCT 28.1 (L) 11/11/2021    MCV 86.3 11/11/2021     11/11/2021     Lab Results   Component Value Date    CREATININE 1.8 (H) 11/11/2021    BUN 50 (H) 11/11/2021     11/11/2021    K 4.4 11/11/2021    CL 99 11/11/2021    CO2 31 11/11/2021       Hepatic Function Panel:   Lab Results   Component Value Date    ALKPHOS 348 11/03/2021    ALT 48 11/03/2021    AST 90 11/03/2021    PROT 7.3 11/03/2021    PROT 6.6 07/21/2011    BILITOT <0.2 11/03/2021    BILIDIR <0.2 10/26/2021    IBILI see below 10/26/2021    LABALBU 2.1 11/11/2021       MICRO:  11/3 BC no growth  11/3 Urine cult no growth    10/13 Surg cult  - no growth      10/7 Surg cult: GS 1+WBC, no organism; cult rare Ps aeruginosa, rare mixed skin sukhjinder  Antibiotic Interpretation ISAAC   cefepime Sensitive 8 mcg/mL   ciprofloxacin Resistant >2 mcg/mL   gentamicin Sensitive <=4 mcg/mL meropenem Sensitive <=1 mcg/mL   piperacillin-tazobactam Sensitive <=16 mcg/mL   tobramycin Sensitive <=4 mcg/mL      10/2 Wound (not know if L or R): light Ps aeruginosa (R cipro), light 2nd E coli, light Enterococcus faecalis  Pseudomonas aeruginosa   Antibiotic Interpretation ISAAC   cefepime Sensitive 8 mcg/mL   ciprofloxacin Resistant >2 mcg/mL   gentamicin Sensitive <=4 mcg/mL   meropenem Sensitive <=1 mcg/mL   piperacillin-tazobactam Sensitive <=16 mcg/mL   tobramycin Sensitive <=4 mcg/mL      Escherichia coli   Antibiotic Interpretation ISAAC   amoxicillin-clavulanate Intermediate 16/8 mcg/mL   ampicillin Resistant >16 mcg/mL   ceFAZolin Resistant >16 mcg/mL   cefepime Sensitive <=2 mcg/mL   cefTRIAXone Sensitive <=1 mcg/mL   cefuroxime Sensitive 8 mcg/mL   ciprofloxacin Resistant >2 mcg/mL   ertapenem Sensitive <=0.5 mcg/mL   gentamicin Sensitive <=4 mcg/mL   meropenem Sensitive <=1 mcg/mL   piperacillin-tazobactam Sensitive <=16 mcg/mL   trimethoprim-sulfamethoxazole Sensitive <=2/38 mcg/mL      Enterococcus  faecalis   Antibiotic Interpretation ISAAC   ampicillin Sensitive <=2 mcg/mL   vancomycin Sensitive 2 mcg/mL         IMAGIN/4 CXR  Impression:   Interval extubation. Stable appearance of the lungs.     11/3 Head CT  'No acute intracranial pathology'     11/3 CXR  1. Endotracheal tube tip just directed at the right mainstem bronchus. Catheter can be withdrawn 1.5 to 2 cm. 2. Multifocal airspace disease      10/3 L foot MRI  Impression   Osteomyelitis involving the fifth metatarsal and fifth proximal phalanx with extensive osseous destruction. Mild osteomyelitis involving the lateral aspect of the cuboid. Prior partial first digit amputation      10/3 R foot MRI   Impression   Multiple prior amputations.    Soft tissue ulceration lateral to the cuboid with mild acute osteomyelitis involving the lateral base of the fourth metatarsal and more distal plantar aspect of the remaining fourth metatarsal amputation), fungemia   - RESOLVED    GI - EGD with no significant findings, 'moderate striped erythema in the gastric antrum and body'    Plan:     Cont vancomycin, meropenem     Wound care per Podiatry     See EBONY  After iv antibiotics  Will need chronic po suppression (choice will be imperfect given FQ R Pseudomonas, Enterococcus,CKD)    Medical Decision Making:   The following items were considered in medical decision making:  Discussion of patient care with other providers  Reviewed clinical lab tests  Reviewed radiology tests  Reviewed other diagnostic tests/interventions  Independent review of radiologic images  Microbiology cultures and other micro tests reviewed      Discussed with pt, RN, GI - Dr Lacy Springer MD     INFUSION ORDERS:   Vancomycin 500 mg iv daily through 11/26  Meropenem 1 gm iv q 12 through 11/26  - Diagnosis - DM foot osteomyelitis   - First dose given in hospital  - PICC   - Disposition / date discharge  - Check CBC w diff, CMP, ESR, CRP, CK every Mon or Tue - FAX result to [phone]  - Call with antibiotic / infusion issues, [phone]  - Call with any change in status, transfer in or out of a facility or to hospital - [phone]  - No f/u in outpatient ID office necessary

## 2021-11-12 NOTE — PROGRESS NOTES
Podiatric Surgery Daily Progress Note  Tara Warren      Subjective :   Patient seen and examined this am at the bedside. Patient denies any acute overnight events. Patient denies N/V/F/C/SOB. Patient denies calf pain, thigh pain, chest pain. Review of Systems: A 12 point review of symptoms is unremarkable with the exception of the chief complaint. Patient specifically denies nausea, fever, vomiting, chills, shortness of breath, chest pain, abdominal pain, constipation or difficulty urinating. Objective     BP (!) 160/76   Pulse 77   Temp 98.8 °F (37.1 °C) (Oral)   Resp 16   Ht 5' (1.524 m)   Wt 178 lb 5.6 oz (80.9 kg)   LMP 10/23/2015   SpO2 95%   BMI 34.83 kg/m²      I/O:    Intake/Output Summary (Last 24 hours) at 11/12/2021 0921  Last data filed at 11/12/2021 0458  Gross per 24 hour   Intake    Output 950 ml   Net -950 ml              Wt Readings from Last 3 Encounters:   11/10/21 178 lb 5.6 oz (80.9 kg)   10/30/21 200 lb 13.4 oz (91.1 kg)   10/20/21 223 lb 1.7 oz (101.2 kg)       LABS:    Recent Labs     11/11/21  0549 11/12/21  0630   WBC 5.5 5.1   HGB 9.2* 9.4*   HCT 28.1* 28.8*    380        Recent Labs     11/12/21  0630      K 4.1      CO2 31   PHOS 3.9   BUN 49*   CREATININE 1.6*        No results for input(s): PROT, INR, APTT in the last 72 hours. LOWER EXTREMITY EXAMINATION    Dressing to bilateral LE intact. No strikethrough noted to the external dressing. Scant sanguinous drainage noted to the internal layers of the dressing. VASCULAR: DP and PT pulses nonpalpable. Upon hand-held Doppler examination, biphasic signals noted to DP and PT bilateral.  CFT is brisk to the digits of the foot b/l. Skin temperature is warm to cool from proximal to distal with no focal calor noted. Nonpitting edema noted bilateral lower extremity.  No pain with calf compression b/l.     NEUROLOGIC:  Gross and epicritic sensation diminished bilateral.  Protective sensation absent at all pedal sites bilateral.     DERMATOLOGIC: Diffuse dermatologic changes noted bilateral lower extremity.     Right lower extremity:  Surgical incision noted to the lateral aspect distal to the wound with skin edges coapted. No signs of dehiscence noted. No fluctuance, crepitus, erythema, drainage, or malodor noted. Full-thickness ulceration noted to the plantar aspect of the cuboid with mixture of granular and fibrotic base and slightly macerated rim. Measures 4.4 cm x 2.8 cm x 0.8 cm. No fluctuance, crepitus, erythema, malodor, or drainage noted. Wound does not probe to bone, tunnel, or track.     Left lower extremity:  Surgical incision noted to the lateral aspect of the left foot with skin edges coapted. Diffuse xerosis noted periincisional.  Mild areas of dehiscence noted towards the mid-distal aspect of the incision. No fluctuance, crepitus, erythema, drainage, or malodor noted.               Full-thickness ulceration noted to the plantar aspect of the fourth metatarsal head with fibrotic base and macerated rim. Measures 2.4 cm x 2.1 cm x 0.1 cm. No fluctuance, crepitus, erythema, malodor, or drainage noted. Wound does not probe to bone, tunnel, or track. MUSCULOSKELETAL: Muscle strength 4/5 for all pedal compartments tested. No pain with palpation of the foot or ankle b/l. Ankle joint ROM is decreased in dorsiflexion with the knee extended. History of hallux amputation of fifth ray resection left foot. History of transmetatarsal amputation with fourth and fifth ray resections right foot.     ASSESSMENT/PLAN  -S/p I&D, partial cuboid resection of right foot, I&D with delayed primary closure of left foot 10/13/2021  -Diabetic foot ulceration, right foot, Carpenter 3  -Diabetic foot ulceration, left foot, Carpenter 1  -Peripheral vascular disease, bilateral lower extremity  -Edema, bilateral lower extremity  -Type 2 diabetes mellitus with peripheral neuropathy  -History of osteomyelitis, bilateral lower extremity  -History of multiple pedal amputations, bilateral lower extremity  -History of noncompliance with weightbearing status     -Patient examined and evaluated at the bedside   -Hypotensive otherwise VSS on heated high flow cannula. No Leukocytosis noted (WBC 6.7). -Hemoglobin A1c 8.3%  -No imaging obtained 2/2 low concern of foot infection and well known chronic osteomyelitis from prior imaging.  -Dressing applied to left lower extremity consisting of wet-to-dry, gauze, Kerlix, Ace bandage  -Dressing applied to the right lower extremity consisting wet-to-dry, Kerlix, Ace bandage  -Continue antibiotics as previously prescribed per the recommendations of Dr. Wanda Cross, vancomycin and meropenem through 11/26/2021  -Prevalon boots reapplied to bilateral lower extremity. To be applied to bilateral lower extremity at all times of sitting/laying.  -Nonweightbearing to bilateral lower extremity   -lengthy discussion was had with patient about the benefits of going to a skilled nursing facility to help facilitate wound healing to bilateral lower extremities. Patient was agreeable at this time     DISPO: Diabetic foot ulceration, bilateral lower extremity; clinically stable at this time. Continue IV antibiotics per prior ID recommendations. Strict nonweightbearing to bilateral lower extremity, patient will benefit from SNF placement. Patient okay for discharge from podiatric standpoint pending medical clearance.     Discussed assessment and plan with Dr. Juan Gutierrez DPM.    Mary Le DPM   Podiatric Resident PGY1  Pager [phone] or [employer]  11/12/2021, 9:21 AM

## 2021-11-12 NOTE — CARE COORDINATION
CM following for  D/c planning: D/C  [facility] :    CM called Roxana Martins ( covering for  Jay today )  [facility] [phone], who states  Pt pre cert  Still pending and  Will call with  Update/determination       Cont to follow for  D/C planning and  Needs:    Patient  will have  Wound Vac, PICC line IV atbx and  Oxygen  ( 4 L nc  ) At  [facility]      [facility] [address] [facility]               [address] [facility], [address] 1949, [place] [address] [place] [address]       Phone: [phone]       Fax: [phone]        Location will depend on bed availability :      CM recv'd call from Podiatry on call to follow up on Wound  Vac  Needs at  D/C:    If patient  Is accepted to [facility]:    They confirmed  They have  Wound vac  There  they will apply upon admission , no [employer] paperwork needed. Will  Need  Wound vac  Instructions on  EBONY /AVS at  Discharge. -  CM  Updated  Pt  Re: disposition [facility] pending pre cert  And  If denied  SNF , [facility] Has  Accepted. -   CM  D/w her  While JAKI Griffin  Was in the as  Pt was  Asking about returning previous facilities that we  Have already  Informed her  Have  Declined to accept her  Back ;  -   Pt  Acknowledged  understanding , all questions and  Concerns  Addressed. Electronically signed by Channie Mortimer, RN on 11/12/2021 at 3:44 PM         Channie Mortimer RN Case Manager  [facility]  [address] [place] [address].   [facility] [address]  [phone]  Fax [phone]

## 2021-11-12 NOTE — PROGRESS NOTES
Hospitalist Progress Note      PCP: Yasmin Cohen MD    Date of Admission: 11/3/2021    Chief Complaint: Inman Erp, unresponsive at nursing home    Hospital Course:   59 y/o F w/ hx if RASHMI, COPD with baseline O2 5-9N, systolic HF, DVT (6222), bilateral lower foot wounds w/ partial amputations, osteomyelitis on vancomycin and meropenem, CKD, DM     Recent admissions  -- 10/01 - 10/16 - progressively worsening b/l lower extremity pain for 2 weeks, OR on 10/7 for bilateral lower extremity I&D with left fifth ray resection, right fourth ray resection, right partial cuboid resection. Surgical path from 10/7 overall showed partially treated osteomyelitis. Patient went back to the OR 10/13 for for repeat I&D with delayed primary closure of right lower extremity and I&D with partial cuboid resection and wound vac application. Patient was hypotensive and difficult to arouse after surgery. on BIPAP with improvement in mental status. Recommended to drop dose of Methadone as likely contributing to her encephalopathy  -- 10/18 - 10/20 --  minimally responsive shortly after receiving her typical 140 mg of methadone in the morning. On arrival of the EMS she was given Narcan with immediate improvement in mental status, she was also noted to have glucose in the 30s and was given glucagon with some improvement. noted to have hypoxia and was placed on nonrebreather oxygen as well as given Haldol and Versed for agitation. Insulin doses were adjusted and dc to facility  -- 10/23 - 11/01 -- due to waxing and wanning mental status. Diuresed with Lasix net -5 L, and discharged on torsemide and stopped methadone  - 11/03 --- lethargic at nursing home, not responding to sternal rub, after receiving 2 doses of narcan, intubated in the ED but extubated once in ICU where requiring NRB  - S/P  EGD on 11/10 showed normal first and second part of duodenum. Moderate striped erythema in the gastric antrum and body.     Subjective: Patient reports that she is feeling a lot better. With her diabetes but denies any abdominal pain, nausea, vomiting. GI recommended obtaining CT abdominal and Pt is refusing. Discussed with patient she said I had a lot of imaging do not want anymore. Medications:  Reviewed    Infusion Medications    sodium chloride Stopped (11/10/21 1623)    dextrose      sodium chloride 25 mL (11/11/21 2212)     Scheduled Medications    torsemide  40 mg Oral Daily    amitriptyline  50 mg Oral Nightly    pantoprazole  40 mg Oral BID AC    vancomycin  500 mg IntraVENous Q24H    apixaban  5 mg Oral BID    insulin glargine  18 Units SubCUTAneous Nightly    meropenem  1,000 mg IntraVENous Q12H    insulin lispro  0-18 Units SubCUTAneous TID WC    insulin lispro  0-9 Units SubCUTAneous Nightly    sodium chloride flush  5-40 mL IntraVENous 2 times per day    metoprolol succinate  50 mg Oral Daily     PRN Meds: oxyCODONE, glucose, dextrose, glucagon (rDNA), dextrose, sodium chloride flush, sodium chloride, ondansetron **OR** ondansetron, polyethylene glycol, acetaminophen **OR** acetaminophen, labetalol, hydrOXYzine, promethazine      Intake/Output Summary (Last 24 hours) at 11/12/2021 1228  Last data filed at 11/12/2021 0833  Gross per 24 hour   Intake    Output 950 ml   Net -950 ml       Physical Exam Performed:    BP (!) 149/89   Pulse 74   Temp 98.4 °F (36.9 °C) (Oral)   Resp 20   Ht 5' (1.524 m)   Wt 178 lb 5.6 oz (80.9 kg)   LMP 10/23/2015   SpO2 95%   BMI 34.83 kg/m²     Constitutional:  Chronically ill appearing, NAD  HENT: Normocephalic and atraumatic. Extraocular movements intact. Conjunctivae normal.   Cardiovascular: Normal rate and regular rhythm with no murmur/gallops and rubs  Pulmonary: Normal respiratory effort. Bilateral clear to auscultate, no wheeze no rhonchi. Abdominal: No tenderness, soft, BS +ve  Musculoskeletal: BLEs wrapped in ace bandages.   Lower extremity with wound VAC  Skin: Skin is warm and dry. Neurological: Alert and oriented x3. Following commands. Grossly nonfocal  Psychiatric: Mood normal.     Labs:   Recent Labs     11/10/21  0524 11/11/21  0549 11/12/21  0630   WBC 6.7 5.5 5.1   HGB 8.5* 9.2* 9.4*   HCT 26.1* 28.1* 28.8*    377 380     Recent Labs     11/10/21  0524 11/11/21  0549 11/12/21  0630    141 140   K 4.7 4.4 4.1   CL 96* 99 100   CO2 34* 31 31   BUN 49* 50* 49*   CREATININE 2.0* 1.8* 1.6*   CALCIUM 9.0 9.1 8.9   PHOS 4.9 4.6 3.9     No results for input(s): AST, ALT, BILIDIR, BILITOT, ALKPHOS in the last 72 hours. No results for input(s): INR in the last 72 hours. No results for input(s): Adonica Hoose in the last 72 hours. Urinalysis:      Lab Results   Component Value Date    NITRU Negative 10/23/2021    WBCUA None seen 10/23/2021    BACTERIA Rare 10/23/2021    RBCUA 0-2 10/23/2021    BLOODU Negative 10/23/2021    SPECGRAV 1.020 10/23/2021    GLUCOSEU 100 10/23/2021       Radiology:  XR CHEST PORTABLE   Final Result   1. Stable chest      XR CHEST PORTABLE   Final Result   Impression: Interval extubation. Stable appearance of the lungs. CT Head WO Contrast   Final Result      No acute intracranial pathology                 XR CHEST PORTABLE   Final Result   1. Endotracheal tube tip just directed at the right mainstem bronchus. Catheter can be withdrawn 1.5 to 2 cm.    2. Multifocal airspace disease      Critical finding reported to  Physician: Erin Guerrero  at 3:18 PM on 11/3/2021, and he confirmed that the tube has been repositioned after review of the above radiograph               Assessment/Plan:    Active Hospital Problems    Diagnosis Date Noted    Acute encephalopathy [G93.40] 10/18/2021    Type 2 diabetes mellitus with left diabetic foot infection ([facility] [address] [A78.216, L08.9]     Type 2 diabetes mellitus with right diabetic foot infection ([address] [Y59.061, L08.9] 03/12/2021    Chronic osteomyelitis of right foot with draining sinus [facility]) [D55.092] 06/03/2019    Diabetic polyneuropathy associated with type 2 diabetes mellitus ([facility] [address] [E11.42] 03/26/2019    Open wound of right foot [S91.301A] 07/03/2018     #Acute respiratory failure with hypoxemia, intubated in the emergency room, extubated once was in ICU to NRB. Required Vapotherm currently on 8L of oxygen  #Pulmonary edema  Her baseline oxygen is 3-4 L NC  Monitor sats, wean as tolerated to keep sats more than 90%. Discussed with RN   Continue antibiotics broad-spectrum vancomycin and Merrem  Discussed with nephrology patient appears to be euvolemic we will transition to torsemide. #Abdominal pain  Continue Protonix 40 twice daily. S/p EGD on 11/10 showed moderate striped erythema in the gastric antrum and body. #Acute encephalopathy toxic due to narcotic overdose, improved with Narcan use. She was asked to stop methadone on discharge on 11/01  Pharmacy verified med list with nursing home she was not getting methadone at facility. Avoid narcotics    #NSTEMI type II due to demand ischemia,   EKG trend flat, no EKG changes critical ischemia or infarction    #Chronic DVTs  Was placed on heparin drip on admission  Continue Eliquis    #Diabetic foot ulceration/chronic osteomyelitis  High fever on admission, ID following, continue broad-spectrum antibiotics till 11/26  Right lower extremity with wound VAC   Wound care per podiatry. Okay to DC per podiatry    #FAHEEM on CKD stage IV, on discharge creatinine was 1.6 (11/01)  Creatinine stable at 1.7> 2.0 due to diuresis. Addition to oral torsemide, monitor renal profile daily    #Chronic diastolic congestive heart failure   Improved proBNP actually lower than discharge on 11/1  Continue diuresis    #Type 2 diabetes  Monitor blood glucose, elevated  Continue carb controlled diet and high-dose sliding scale insulin  Continue Lantus  Hypoglycemia protocol    DVT Prophylaxis: Eliquis  Diet: ADULT DIET;  Regular  ADULT ORAL NUTRITION SUPPLEMENT; Dinner; Fortified Pudding Oral Supplement  Code Status: Full Code    PT/OT Eval Status:  In progress    Dispo -patient is medically stable to be discharged pending pre-CERT    This chart was likely completed using voice recognition technology and may contain unintended grammatical , phraseology,and/or punctuation errors    Roxann Spears MD  Hospitalist

## 2021-11-12 NOTE — PROGRESS NOTES
Nephrology  Note                                                                                                                                                                                                                                                                                                                                                               Office : [phone]     Fax :[phone]              Patient's Name: Goldy Romero  2021    Reason for Consult:  Edema   Requesting Physician:  Daria Davis MD      Good diuresis   Cr stable   No SOB          Past Medical History:   Diagnosis Date    Asthma 2004    Bacterial vaginosis 2008    Carpal tunnel syndrome 2007    COPD (chronic obstructive pulmonary disease) ([address]     Diabetes mellitus type II 2007    10/1/20 pt states does accucheck 2x/day at home    Diabetic neuropathy ([address]     Diastolic CHF ([address]     DVT (deep venous thrombosis) [facility] 2004    Dyslipidemia 2009    Dyspareunia 2009    ETOH abuse 2007    HTN (hypertension)     Hx of blood clots     Hyperlipidemia     MRSA (methicillin resistant staph aureus) culture positive 2017; 2017    foot; leg     Neuropathy 2009    polyneuropathy    Pancreatitis 2004    Tobacco abuse 2008    Uses wheelchair     also uses walker       Past Surgical History:   Procedure Laterality Date     SECTION  unknown    FOOT DEBRIDEMENT Right 2019    INCISION AND DRAINAGE WITH APPLICATION OF STRAVIX GRAFT RIGHT FOOT performed by Jason Bronson DPM at [facility] [address] Right 2019    RIGHT FOOT INCISION AND DRAINAGE WITH STAGING TRANSMETATARSAL AMPUTATION performed by Jason Bronson DPM at [address] Right 2019    RIGHT FOOT DEBRIDEMENT INCISION AND DRAINAGE, OPEN DIABETIC FOOT ULCER WITH GRAFT PLACEMENT performed by Jason Bronson DPM at [facility] [address] Left 10/23/2019    LEFT FOOT INCISION AND DRAINAGE , DEBRIDEMENT OF OPEN WOUND, APPLICATION OF STRAVIX GRAFT performed by Autumn Rodriguez DPM at [address] Right 3/29/2021    INCISION AND DRAINAGE, DEBRIDEMENT OF DIABETIC WOUND WITH PLACEMENT OF STRAVIX GRAFT RIGHT FOOT performed by Autumn Rodriguez DPM at [address]  10/7/2021    BILATERAL LOWER EXTREMITY INCISION AND DRAINAGE, RIGHT FOOT 4TH RAY RESECTION, LEFT FOOT 5TH RAY RESECTION performed by Autumn Rodriguez DPM at [address] Bilateral 10/13/2021    REPEAT INCISION AND DRAINAGE OF ALL NONVIABLE SOFT TISSUE AND BONE/ PARTIAL CUBOID RESECTION/ BONY BIOPSY AS NEEDED/ WOUND VAC RIGHT LOWER EXTREMITY performed by Autumn Rodriguez DPM at [address] IR TUNNELED [address] 5 YEARS  10/5/2021    IR TUNNELED CATHETER PLACEMENT GREATER THAN 5 YEARS 10/5/2021 [address] SPECIAL PROCEDURES    KNEE SURGERY Left     from falling off ladder -- has screws in place pt report    OTHER SURGICAL HISTORY Left 05/25/2016    I & D left foot    OTHER SURGICAL HISTORY Right 10/20/2017    RIGHT GASTROC LENGTHENING ENDOSCOPIC, INJECTION OF AMNI GRAFT    OTHER SURGICAL HISTORY Right 04/26/2018    Diabetic foot ulcer I&D w/ integra graft application    HI DEBRIDEMENT, SKIN, SUB-Q TISSUE,MUSCLE,BONE,=<20 SQ CM Right 8/17/2018    RIGHT FOOT DEBRIDEMENT INCISION AND DRAINAGE, PARTIAL 5TH RAY AMPUTATION performed by Autumn Rodriguez DPM at [address] / [address]  7/7003    cryotherapy done on lesion    TOE AMPUTATION Left 02/24/2017    AMPUTATION LEFT GREAT TOE                 TONSILLECTOMY      UPPER GASTROINTESTINAL ENDOSCOPY N/A 11/10/2021    EGD BIOPSY performed by Jyothi Meadows MD at [place] [address]       History reviewed. No pertinent family history. reports that she quit smoking about 3 years ago. Her smoking use included cigarettes. She has a 30.00 pack-year smoking history.  She has never used smokeless tobacco. She reports that she does not drink alcohol and does not use drugs.     Allergies:  Sulfa antibiotics    Current Medications:    torsemide (DEMADEX) tablet 40 mg, Daily  0.9 % sodium chloride infusion, Once  amitriptyline (ELAVIL) tablet 50 mg, Nightly  pantoprazole (PROTONIX) tablet 40 mg, BID AC  vancomycin (VANCOCIN) 500 mg in dextrose 5 % 250 mL IVPB, Q24H  apixaban (ELIQUIS) tablet 5 mg, BID  insulin glargine (LANTUS;BASAGLAR) injection pen 18 Units, Nightly  meropenem (MERREM) 1,000 mg in sodium chloride 0.9 % 100 mL IVPB (mini-bag), Q12H  insulin lispro (1 Unit Dial) 0-18 Units, TID WC  insulin lispro (1 Unit Dial) 0-9 Units, Nightly  oxyCODONE (ROXICODONE) immediate release tablet 5 mg, Q4H PRN  glucose (GLUTOSE) 40 % oral gel 15 g, PRN  dextrose 50 % IV solution, PRN  glucagon (rDNA) injection 1 mg, PRN  dextrose 5 % solution, PRN  sodium chloride flush 0.9 % injection 5-40 mL, 2 times per day  sodium chloride flush 0.9 % injection 5-40 mL, PRN  0.9 % sodium chloride infusion, PRN  ondansetron (ZOFRAN-ODT) disintegrating tablet 4 mg, Q8H PRN   Or  ondansetron (ZOFRAN) injection 4 mg, Q6H PRN  polyethylene glycol (GLYCOLAX) packet 17 g, Daily PRN  acetaminophen (TYLENOL) tablet 650 mg, Q6H PRN   Or  acetaminophen (TYLENOL) suppository 650 mg, Q6H PRN  labetalol (NORMODYNE;TRANDATE) injection 10 mg, Q4H PRN  hydrOXYzine (VISTARIL) capsule 50 mg, Q8H PRN  promethazine (PHENERGAN) tablet 25 mg, Q6H PRN  metoprolol succinate (TOPROL XL) extended release tablet 50 mg, Daily        Review of Systems:   14 point ROS obtained but were negative except mentioned in HPI      Physical exam:     Vitals:  BP (!) 160/76   Pulse 77   Temp 98.8 °F (37.1 °C) (Oral)   Resp 16   Ht 5' (1.524 m)   Wt 178 lb 5.6 oz (80.9 kg)   LMP 10/23/2015   SpO2 95%   BMI 34.83 kg/m²   Constitutional:  OAA X3 NAD  Skin: no rash, turgor wnl  Heent:  eomi, mmm  Neck: no bruits or jvd noted  Cardiovascular:  S1, S2 without m/r/g  Respiratory: CTA B without w/r/r  Abdomen:  +bs, soft, nt, nd  Ext: + lower extremity edema  Psychiatric: mood and affect appropriate  Musculoskeletal:  Rom, muscular strength intact    Data:   Labs:  CBC:   Recent Labs     11/10/21  0524 11/11/21  0549 11/12/21  0630   WBC 6.7 5.5 5.1   HGB 8.5* 9.2* 9.4*    377 380     BMP:    Recent Labs     11/10/21  0524 11/11/21  0549 11/12/21  0630    141 140   K 4.7 4.4 4.1   CL 96* 99 100   CO2 34* 31 31   BUN 49* 50* 49*   CREATININE 2.0* 1.8* 1.6*   GLUCOSE 285* 132* 85     Ca/Mg/Phos:   Recent Labs     11/10/21  0524 11/11/21  0549 11/12/21  0630   CALCIUM 9.0 9.1 8.9   MG 1.80  --   --    PHOS 4.9 4.6 3.9     Hepatic: No results for input(s): AST, ALT, ALB, BILITOT, ALKPHOS in the last 72 hours. Troponin: No results for input(s): TROPONINI in the last 72 hours. BNP: No results for input(s): BNP in the last 72 hours. Lipids: No results for input(s): CHOL, TRIG, HDL, LDLCALC, LABVLDL in the last 72 hours. ABGs: No results for input(s): PHART, PO2ART, YOC2KNR in the last 72 hours. INR: No results for input(s): INR in the last 72 hours. UA:No results for input(s): Arlin Gables, GLUCOSEU, BILIRUBINUR, Raynald Kida, BLOODU, PHUR, PROTEINU, UROBILINOGEN, NITRU, LEUKOCYTESUR, LABMICR, URINETYPE in the last 72 hours. Urine Microscopic: No results for input(s): LABCAST, BACTERIA, COMU, HYALCAST, WBCUA, RBCUA, EPIU in the last 72 hours. Urine Culture: No results for input(s): LABURIN in the last 72 hours. Urine Chemistry: No results for input(s): Reynoso Carton, PROTEINUR, NAUR in the last 72 hours. IMAGING:  XR CHEST PORTABLE   Final Result   1. Stable chest      XR CHEST PORTABLE   Final Result   Impression: Interval extubation. Stable appearance of the lungs. CT Head WO Contrast   Final Result      No acute intracranial pathology                 XR CHEST PORTABLE   Final Result   1. Endotracheal tube tip just directed at the right mainstem bronchus. Catheter can be withdrawn 1.5 to 2 cm. 2. Multifocal airspace disease      Critical finding reported to  Physician: Raphael Come  at 3:18 PM on 11/3/2021, and he confirmed that the tube has been repositioned after review of the above radiograph           Assessment/Plan   1. CKD 3     2. HTN    3. Anemia    4. Acid- base/ Electrolyte imbalance     5.  Edema     Plan   - cont torsemide PO   - Aspiration was big issue as pt was lethargic before she was intubated  - BNP 1500   - Inc nutrition   - Abx   - Labs in am     Recommend to dose adjust all medications  based on renal functions  Maintain SBP> 90 mmHg   Daily weights   AVOID NSAIDs  Avoid Nephrotoxins  Monitor Intake/Output  Call if significant decrease in urine output                 Thank you for allowing us to participate in care of César Yost MD  Feel free to contact me   [facility] [address]  Office : [phone]  Fax :[phone]

## 2021-11-12 NOTE — PROGRESS NOTES
[address] [place] [address]  GI Progress Note        Lachelle Duran is a 60 y.o. female patient. 1. Acute hypoxemic respiratory failure ([facility]    2. Chronic osteomyelitis of right foot with draining sinus ([facility]        Admit Date: 11/3/2021    Subjective:       Patient seen and examined this am. Reports epigastric abdominal pain. Denies nausea or vomiting. No melena or hematochezia. Tolerating meals but reporting low appetite. Patient refused CT scan yesterday.       ROS:  Cardiovascular ROS: no chest pain or dyspnea on exertion  Gastrointestinal ROS: Reports epigastric abdominal pain with no change in bowel habits, or black or bloody stools  Respiratory ROS: no cough, shortness of breath, or wheezing    Scheduled Meds:   torsemide  40 mg Oral Daily    amitriptyline  50 mg Oral Nightly    pantoprazole  40 mg Oral BID AC    vancomycin  500 mg IntraVENous Q24H    apixaban  5 mg Oral BID    insulin glargine  18 Units SubCUTAneous Nightly    meropenem  1,000 mg IntraVENous Q12H    insulin lispro  0-18 Units SubCUTAneous TID WC    insulin lispro  0-9 Units SubCUTAneous Nightly    sodium chloride flush  5-40 mL IntraVENous 2 times per day    metoprolol succinate  50 mg Oral Daily       Continuous Infusions:   sodium chloride Stopped (11/10/21 1623)    dextrose      sodium chloride 25 mL (11/11/21 2212)       PRN Meds:  oxyCODONE, glucose, dextrose, glucagon (rDNA), dextrose, sodium chloride flush, sodium chloride, ondansetron **OR** ondansetron, polyethylene glycol, acetaminophen **OR** acetaminophen, labetalol, hydrOXYzine, promethazine      Objective:       Patient Vitals for the past 24 hrs:   BP Temp Temp src Pulse Resp SpO2   11/12/21 0833 (!) 160/76 98.8 °F (37.1 °C) Oral 77 16 95 %   11/12/21 0512 122/78 98.6 °F (37 °C) Oral 74 16 96 %   11/12/21 0049 126/80 98.4 °F (36.9 °C) Oral 72 18 92 %   11/11/21 1957 132/78 98.9 °F (37.2 °C) Oral 71 20 91 %   11/11/21 1615 129/71 100 °F (37.8 °C) Oral 71 18 96 %   21 1420     18 96 %   21 1124 132/70 97.8 °F (36.6 °C) Oral 72 18 93 %       Exam:  VITALS:  BP (!) 160/76   Pulse 77   Temp 98.8 °F (37.1 °C) (Oral)   Resp 16   Ht 5' (1.524 m)   Wt 178 lb 5.6 oz (80.9 kg)   LMP 10/23/2015   SpO2 95%   BMI 34.83 kg/m²   TEMPERATURE:  Current - Temp: 98.8 °F (37.1 °C); Max - Temp  Av.8 °F (37.1 °C)  Min: 97.8 °F (36.6 °C)  Max: 100 °F (37.8 °C)    NAD  General appearance: alert, appears stated age, cooperative and no distress  Head: Normocephalic, without obvious abnormality, atraumatic  Neck: supple, symmetrical, trachea midline and thyroid not enlarged, symmetric, no tenderness/mass/nodules  CVS:  RRR, Nl s1s2  Lungs CTA Bilaterally, normal effort  Abdomen: Soft, non-distended, tender in epigastric area, no masses or organomegaly. AAOx3, No asterixis or encephalopathy  Extremities: No edema. Recent Labs     11/10/21  0524 21  0549 21  0630   WBC 6.7 5.5 5.1   HGB 8.5* 9.2* 9.4*   HCT 26.1* 28.1* 28.8*   MCV 86.5 86.3 84.8    377 380     Recent Labs     11/10/21  0524 21  0549 21  0630    141 140   K 4.7 4.4 4.1   CL 96* 99 100   CO2 34* 31 31   PHOS 4.9 4.6 3.9   BUN 49* 50* 49*   CREATININE 2.0* 1.8* 1.6*     No results for input(s): AST, ALT, ALB, BILIDIR, BILITOT, ALKPHOS in the last 72 hours. No results for input(s): LIPASE, AMYLASE in the last 72 hours. No results for input(s): PROT, INR in the last 72 hours. No results for input(s): PTT in the last 72 hours. No results for input(s): OCCULTBLD in the last 72 hours.     Radiology review: complete    Assessment:       Active Problems:    Open wound of right foot    Diabetic polyneuropathy associated with type 2 diabetes mellitus (HCC)    Chronic osteomyelitis of right foot with draining sinus (HCC)    Type 2 diabetes mellitus with right diabetic foot infection (HCC)    Type 2 diabetes mellitus with left diabetic foot infection ([facility]    Acute encephalopathy  Resolved Problems:    * No resolved hospital problems. *      1. LUQ abdominal pain   2. EGD 11/10 with moderate gastritis - biopsies pending  3. Anemia    Recommendations:       1. Continue PPI BID  2. Follow up biopsy results  3. Patient refused CT scan at this time, will recommend CT scan at a later time to evaluate   4.  Counseled patient on importance of colonoscopy as an outpatient given her anemia and abdominal pain     Janny Tiwari MD, PGY-1  11/12/2021  8:44 AM    Will present and staff with patient with Dr. Alan Tatum MD.

## 2021-11-12 NOTE — PROGRESS NOTES
Clinical Pharmacy Progress Note    Vancomycin - Management by Pharmacy    Consult Date(s): 11/3/21  Consulting Provider(s): Dr Mario Benton / Plan  1)  B/L foot infection / osteomyelitis - Vancomycin   Concurrent Antimicrobials: Meropenem   Day of Vanc + Meropenem therapy: To continue through 11/26 per ID   Current Dosing Method:  Trough based dosing  o Therapeutic Goal: Trough ~15 mcg/mL  o Trough 11/10 = 16.5 mcg/mL, therapeutic  o Continue 500 mg IV q24h. o Will plan to check next trough on Monday 11/15 if still here.  Will continue to monitor clinical condition and make adjustments to regimen as appropriate. Please call with questions--  Thanks--  Dionne Cardoso, PharmD, BCPS, BCGP  [id number] (\[facility] [place] [facility]\"")   11/12/2021 1:08 PM      Interval update:   Awaiting precert for SNF. Subjective/Objective: Ms. Buck Llanes is a 60 y.o. female with a PMHx significant for osteomyelitis on vanc/meropenem in NH, CKD4, DM, narcotic dependent chronic pain, admitted for AMS which responded to Narcan. In the ED, patient became combative and went into respiratory distress requiring intubation. Per the ED, patient also vomited several times and there is concern for new aspiration PNA. Pharmacy has been consulted to continue patient's vancomycin dosing. Ht Readings from Last 1 Encounters:   11/08/21 5' (1.524 m)      Wt Readings from Last 1 Encounters:   11/10/21 178 lb 5.6 oz (80.9 kg)       Vancomycin Level(s) / Doses:    Date Time Dose Level / Type of Level Interpretation   11/6 05:14 750mg q24h Random = 23.0mcg/mL Drawn ~19 hr after prior dose given, predicts  and trough 23.8   11/7 03:07  Random = 13.2mcg/mL Intermittent dosing   11/8 05:29  Random = 13.9 mcg/mL Will schedule 500 mg IV q24h   11/10 08:35 500 mg q24h Trough = 16.5 mcg/mL Continue 500 mg IV q24h   Note: Serum levels collected for AUC-based dosing may be high if collected in close proximity to the dose administered.  This is not necessarily an indicator of toxicity. Cultures & Sensitivities:    Date Site Micro Susceptibility / Result   11/3 Blood x2 No growth to date    11/4 Urine No growth      Labs / Ancillary Data:    Estimated Creatinine Clearance: 36 mL/min (A) (based on SCr of 1.6 mg/dL (H)).     Recent Labs     11/10/21  0524 11/11/21  0549 11/12/21  0630   CREATININE 2.0* 1.8* 1.6*   BUN 49* 50* 49*   WBC 6.7 5.5 5.1

## 2021-11-12 NOTE — PROGRESS NOTES
ID Follow-up NOTE    CC:   R DFI / OM. Fever, Leukocytosis  Antibiotics: Vancomycin, Meropenem    Admit Date: 11/3/2021  Hospital Day: 10    Subjective:     Pt reports she is 'ok'  Now able to eat  R foot 'hurts a little bit'    Objective:     Patient Vitals for the past 8 hrs:   BP Temp Temp src Pulse Resp SpO2   11/12/21 0833 (!) 160/76 98.8 °F (37.1 °C) Oral 77 16 95 %   11/12/21 0512 122/78 98.6 °F (37 °C) Oral 74 16 96 %     I/O last 3 completed shifts: In: 61 [P.O.:60]  Out: 950 [Urine:950]  No intake/output data recorded. EXAM:  GENERAL: No apparent distress.   4L  HEENT: Membranes moist, no oral lesion  NECK:  Supple, no lymphadenopathy  LUNGS: Clear b/l, no rales, no dullness  CARDIAC: RRR, no murmur appreciated  ABD:  + BS, soft - tender in LUQ  EXT:  Bilateral pedal dressings  NEURO: No focal neurologic findings  PSYCH: Orientation, sensorium, mood normal  LINES:  R chest line in place       Data Review:  Lab Results   Component Value Date    WBC 5.1 11/12/2021    HGB 9.4 (L) 11/12/2021    HCT 28.8 (L) 11/12/2021    MCV 84.8 11/12/2021     11/12/2021     Lab Results   Component Value Date    CREATININE 1.6 (H) 11/12/2021    BUN 49 (H) 11/12/2021     11/12/2021    K 4.1 11/12/2021     11/12/2021    CO2 31 11/12/2021       Hepatic Function Panel:   Lab Results   Component Value Date    ALKPHOS 348 11/03/2021    ALT 48 11/03/2021    AST 90 11/03/2021    PROT 7.3 11/03/2021    PROT 6.6 07/21/2011    BILITOT <0.2 11/03/2021    BILIDIR <0.2 10/26/2021    IBILI see below 10/26/2021    LABALBU 2.3 11/12/2021       MICRO:  11/3 BC no growth  11/3 Urine cult no growth    10/13 Surg cult  - no growth      10/7 Surg cult: GS 1+WBC, no organism; cult rare Ps aeruginosa, rare mixed skin sukhjinder  Antibiotic Interpretation ISAAC   cefepime Sensitive 8 mcg/mL   ciprofloxacin Resistant >2 mcg/mL   gentamicin Sensitive <=4 mcg/mL   meropenem Sensitive <=1 mcg/mL   piperacillin-tazobactam Sensitive <=16 mcg/mL   tobramycin Sensitive <=4 mcg/mL      10/2 Wound (not know if L or R): light Ps aeruginosa (R cipro), light 2nd E coli, light Enterococcus faecalis  Pseudomonas aeruginosa   Antibiotic Interpretation ISAAC   cefepime Sensitive 8 mcg/mL   ciprofloxacin Resistant >2 mcg/mL   gentamicin Sensitive <=4 mcg/mL   meropenem Sensitive <=1 mcg/mL   piperacillin-tazobactam Sensitive <=16 mcg/mL   tobramycin Sensitive <=4 mcg/mL      Escherichia coli   Antibiotic Interpretation ISAAC   amoxicillin-clavulanate Intermediate 16/8 mcg/mL   ampicillin Resistant >16 mcg/mL   ceFAZolin Resistant >16 mcg/mL   cefepime Sensitive <=2 mcg/mL   cefTRIAXone Sensitive <=1 mcg/mL   cefuroxime Sensitive 8 mcg/mL   ciprofloxacin Resistant >2 mcg/mL   ertapenem Sensitive <=0.5 mcg/mL   gentamicin Sensitive <=4 mcg/mL   meropenem Sensitive <=1 mcg/mL   piperacillin-tazobactam Sensitive <=16 mcg/mL   trimethoprim-sulfamethoxazole Sensitive <=2/38 mcg/mL      Enterococcus  faecalis   Antibiotic Interpretation ISAAC   ampicillin Sensitive <=2 mcg/mL   vancomycin Sensitive 2 mcg/mL         IMAGIN/4 CXR  Impression:   Interval extubation. Stable appearance of the lungs.     11/3 Head CT  'No acute intracranial pathology'     11/3 CXR  1. Endotracheal tube tip just directed at the right mainstem bronchus. Catheter can be withdrawn 1.5 to 2 cm. 2. Multifocal airspace disease      10/3 L foot MRI  Impression   Osteomyelitis involving the fifth metatarsal and fifth proximal phalanx with extensive osseous destruction. Mild osteomyelitis involving the lateral aspect of the cuboid. Prior partial first digit amputation      10/3 R foot MRI   Impression   Multiple prior amputations. Soft tissue ulceration lateral to the cuboid with mild acute osteomyelitis involving the lateral base of the fourth metatarsal and more distal plantar aspect of the remaining fourth metatarsal shaft as well as the lateral aspect of the cuboid.        Scheduled Meds:   torsemide  40 mg Oral Daily    amitriptyline  50 mg Oral Nightly    pantoprazole  40 mg Oral BID AC    vancomycin  500 mg IntraVENous Q24H    apixaban  5 mg Oral BID    insulin glargine  18 Units SubCUTAneous Nightly    meropenem  1,000 mg IntraVENous Q12H    insulin lispro  0-18 Units SubCUTAneous TID WC    insulin lispro  0-9 Units SubCUTAneous Nightly    sodium chloride flush  5-40 mL IntraVENous 2 times per day    metoprolol succinate  50 mg Oral Daily       Continuous Infusions:   sodium chloride Stopped (11/10/21 1623)    dextrose      sodium chloride 25 mL (11/11/21 2212)       PRN Meds:  oxyCODONE, glucose, dextrose, glucagon (rDNA), dextrose, sodium chloride flush, sodium chloride, ondansetron **OR** ondansetron, polyethylene glycol, acetaminophen **OR** acetaminophen, labetalol, hydrOXYzine, promethazine      Assessment:     Obesity (BMI 37), DM, neuropathy, HTN, HL, CKD, chronic pain  Hx DM foot infection / surgeries - has R TMA, L hallux resection     L foot wound infection / osteomyelitis   - MRI with 5th MT + prox 5th phalanx destruction, lateral cuboid edema   - OR 10/7 - 5th ray resection; path - 'focal subacute, partially treated osteomyelitis' (L 5th MT)     R foot wound infection / osteomyelitis - limb threatening, possible limb loss / BKA discussed with pt in past, refuses consideration of R BKA   - MRI with cuboid, 4th MT OM   - OR 10/7 - R 4th ray, partial cuboid resection - NOT definitive   - Path with 4th MT acute osteomyelitis, R cuboid 'focal acute osteomyelitis', R cuboid clearance frag 'rare focus of acute osteomyelitis'   - OR 10/13 - L I&D and closure; R I&D, further cuboid resection, VAC   - Cult polymicrobial with Ps aerug (R cipro), E coli, E faecalis     Narcotic dependence  Encephalopathy      Fever   - risk aspiration, line infection, progression pedal / foot infection (had recommended R amputation), fungemia   - RESOLVED    GI - EGD with no significant

## 2021-11-12 NOTE — PROGRESS NOTES
Patient refused incontinence care. Patient arguing with student and nurse. States \"I DONT GIVE A [address] ! LEAVE ME ALONE. DONT TOUCH ME\". Patient educated on safety and skin issues concerning incontinence care. Patient continues to refuse. Report given to oncoming nurse, explaining situation.

## 2021-11-12 NOTE — PROGRESS NOTES
Physical Therapy and Occupational Therapy  No Treatment    Attempted to see pt for PT/OT. Pt adamantly refusing to participate. \"No!  I have nothing left for anybody. I just want to leave. Please close the door on your way out. \"  RN made aware    Renetta Fisher, OTR/L [id number]

## 2021-11-12 NOTE — PROGRESS NOTES
Patient is alert and oriented x4. No complaints this shift. Pain managed with PRN medications. Abx given at due time. PICC line intact. Patient's call light is within reach, bed is lowered, and bed alarm is on. Will continue to monitor.

## 2021-11-12 NOTE — PROGRESS NOTES
Nephrology  Note                                                                                                                                                                                                                                                                                                                                                               Office : [phone]     Fax :[phone]              Patient's Name: Rosenda Miranda  2021    Reason for Consult:  Edema   Requesting Physician:  Bahman Escoto MD      Good diuresis   Cr stable   No SOB          Past Medical History:   Diagnosis Date    Asthma 2004    Bacterial vaginosis 2008    Carpal tunnel syndrome 2007    COPD (chronic obstructive pulmonary disease) ([address]     Diabetes mellitus type II 2007    10/1/20 pt states does accucheck 2x/day at home    Diabetic neuropathy ([address]     Diastolic CHF ([address]     DVT [facility]deep venous thrombosis) [facility] 2004    Dyslipidemia 2009    Dyspareunia 2009    ETOH abuse 2007    HTN (hypertension)     Hx of blood clots     Hyperlipidemia     MRSA (methicillin resistant staph aureus) culture positive 2017; 2017    foot; leg     Neuropathy 2009    polyneuropathy    Pancreatitis 2004    Tobacco abuse 2008    Uses wheelchair     also uses walker       Past Surgical History:   Procedure Laterality Date     SECTION  unknown    FOOT DEBRIDEMENT Right 2019    INCISION AND DRAINAGE WITH APPLICATION OF STRAVIX GRAFT RIGHT FOOT performed by Cassidy Braden DPM at [address] [place] [address] Right 2019    RIGHT FOOT INCISION AND DRAINAGE WITH STAGING TRANSMETATARSAL AMPUTATION performed by Cassidy Braden DPM at [address] [place] [address] Right 2019    RIGHT FOOT DEBRIDEMENT INCISION AND DRAINAGE, OPEN DIABETIC FOOT ULCER WITH GRAFT PLACEMENT performed by Cassidy Braden DPM at [address] [place] [address] Left 10/23/2019    LEFT FOOT INCISION AND DRAINAGE , DEBRIDEMENT OF OPEN WOUND, APPLICATION OF STRAVIX GRAFT performed by Emerson Fraser DPM at [address] Right 3/29/2021    INCISION AND DRAINAGE, DEBRIDEMENT OF DIABETIC WOUND WITH PLACEMENT OF STRAVIX GRAFT RIGHT FOOT performed by Emerson Fraser DPM at [address]  10/7/2021    BILATERAL LOWER EXTREMITY INCISION AND DRAINAGE, RIGHT FOOT 4TH RAY RESECTION, LEFT FOOT 5TH RAY RESECTION performed by Emerson Fraser DPM at [address] Bilateral 10/13/2021    REPEAT INCISION AND DRAINAGE OF ALL NONVIABLE SOFT TISSUE AND BONE/ PARTIAL CUBOID RESECTION/ BONY BIOPSY AS NEEDED/ WOUND VAC RIGHT LOWER EXTREMITY performed by Emerson Fraser DPM at [address] IR TUNNELED Garlan Chiquito 5 YEARS  10/5/2021    IR TUNNELED CATHETER PLACEMENT GREATER THAN 5 YEARS 10/5/2021 [facility] SPECIAL PROCEDURES    KNEE SURGERY Left     from falling off ladder -- has screws in place pt report    OTHER SURGICAL HISTORY Left 05/25/2016    I & D left foot    OTHER SURGICAL HISTORY Right 10/20/2017    RIGHT GASTROC LENGTHENING ENDOSCOPIC, INJECTION OF AMNI GRAFT    OTHER SURGICAL HISTORY Right 04/26/2018    Diabetic foot ulcer I&D w/ integra graft application    AR DEBRIDEMENT, SKIN, SUB-Q TISSUE,MUSCLE,BONE,=<20 SQ CM Right 8/17/2018    RIGHT FOOT DEBRIDEMENT INCISION AND DRAINAGE, PARTIAL 5TH RAY AMPUTATION performed by Emerson Fraser DPM at [address] [place] / [address]  8/5807    cryotherapy done on lesion    TOE AMPUTATION Left 02/24/2017    AMPUTATION LEFT GREAT TOE                 TONSILLECTOMY      UPPER GASTROINTESTINAL ENDOSCOPY N/A 11/10/2021    EGD BIOPSY performed by Chris No MD at [place] [address]       History reviewed. No pertinent family history. reports that she quit smoking about 3 years ago. Her smoking use included cigarettes. She has a 30.00 pack-year smoking history.  She has never used smokeless tobacco. She reports that she does not drink alcohol and does not use drugs.     Allergies:  Sulfa antibiotics    Current Medications:    torsemide (DEMADEX) tablet 40 mg, Daily  0.9 % sodium chloride infusion, Once  amitriptyline (ELAVIL) tablet 50 mg, Nightly  pantoprazole (PROTONIX) tablet 40 mg, BID AC  vancomycin (VANCOCIN) 500 mg in dextrose 5 % 250 mL IVPB, Q24H  apixaban (ELIQUIS) tablet 5 mg, BID  insulin glargine (LANTUS;BASAGLAR) injection pen 18 Units, Nightly  meropenem (MERREM) 1,000 mg in sodium chloride 0.9 % 100 mL IVPB (mini-bag), Q12H  insulin lispro (1 Unit Dial) 0-18 Units, TID WC  insulin lispro (1 Unit Dial) 0-9 Units, Nightly  oxyCODONE (ROXICODONE) immediate release tablet 5 mg, Q4H PRN  glucose (GLUTOSE) 40 % oral gel 15 g, PRN  dextrose 50 % IV solution, PRN  glucagon (rDNA) injection 1 mg, PRN  dextrose 5 % solution, PRN  sodium chloride flush 0.9 % injection 5-40 mL, 2 times per day  sodium chloride flush 0.9 % injection 5-40 mL, PRN  0.9 % sodium chloride infusion, PRN  ondansetron (ZOFRAN-ODT) disintegrating tablet 4 mg, Q8H PRN   Or  ondansetron (ZOFRAN) injection 4 mg, Q6H PRN  polyethylene glycol (GLYCOLAX) packet 17 g, Daily PRN  acetaminophen (TYLENOL) tablet 650 mg, Q6H PRN   Or  acetaminophen (TYLENOL) suppository 650 mg, Q6H PRN  labetalol (NORMODYNE;TRANDATE) injection 10 mg, Q4H PRN  hydrOXYzine (VISTARIL) capsule 50 mg, Q8H PRN  promethazine (PHENERGAN) tablet 25 mg, Q6H PRN  metoprolol succinate (TOPROL XL) extended release tablet 50 mg, Daily        Review of Systems:   14 point ROS obtained but were negative except mentioned in HPI      Physical exam:     Vitals:  /78   Pulse 71   Temp 98.9 °F (37.2 °C) (Oral)   Resp 20   Ht 5' (1.524 m)   Wt 178 lb 5.6 oz (80.9 kg)   LMP 10/23/2015   SpO2 91%   BMI 34.83 kg/m²   Constitutional:  OAA X3 NAD  Skin: no rash, turgor wnl  Heent:  eomi, mmm  Neck: no bruits or jvd noted  Cardiovascular: S1, S2 without m/r/g  Respiratory: CTA B without w/r/r  Abdomen:  +bs, soft, nt, nd  Ext: + lower extremity edema  Psychiatric: mood and affect appropriate  Musculoskeletal:  Rom, muscular strength intact    Data:   Labs:  CBC:   Recent Labs     11/09/21  0640 11/10/21  0524 11/11/21  0549   WBC 6.0 6.7 5.5   HGB 8.5* 8.5* 9.2*    380 377     BMP:    Recent Labs     11/09/21  0640 11/10/21  0524 11/11/21  0549    139 141   K 4.3 4.7 4.4   CL 96* 96* 99   CO2 36* 34* 31   BUN 46* 49* 50*   CREATININE 1.7* 2.0* 1.8*   GLUCOSE 276* 285* 132*     Ca/Mg/Phos:   Recent Labs     11/09/21  0640 11/10/21  0524 11/11/21  0549   CALCIUM 8.7 9.0 9.1   MG 2.00 1.80  --    PHOS 3.6 4.9 4.6     Hepatic: No results for input(s): AST, ALT, ALB, BILITOT, ALKPHOS in the last 72 hours. Troponin: No results for input(s): TROPONINI in the last 72 hours. BNP: No results for input(s): BNP in the last 72 hours. Lipids: No results for input(s): CHOL, TRIG, HDL, LDLCALC, LABVLDL in the last 72 hours. ABGs: No results for input(s): PHART, PO2ART, QYT0NAH in the last 72 hours. INR: No results for input(s): INR in the last 72 hours. UA:No results for input(s): Dee Hasting, GLUCOSEU, BILIRUBINUR, Juanpablo Denver, BLOODU, PHUR, PROTEINU, UROBILINOGEN, NITRU, LEUKOCYTESUR, LABMICR, URINETYPE in the last 72 hours. Urine Microscopic: No results for input(s): LABCAST, BACTERIA, COMU, HYALCAST, WBCUA, RBCUA, EPIU in the last 72 hours. Urine Culture: No results for input(s): LABURIN in the last 72 hours. Urine Chemistry: No results for input(s): Lynn Chalet, PROTEINUR, NAUR in the last 72 hours. IMAGING:  XR CHEST PORTABLE   Final Result   1. Stable chest      XR CHEST PORTABLE   Final Result   Impression: Interval extubation. Stable appearance of the lungs. CT Head WO Contrast   Final Result      No acute intracranial pathology                 XR CHEST PORTABLE   Final Result   1.  Endotracheal tube tip

## 2021-11-13 NOTE — PROGRESS NOTES
Hospitalist Progress Note      PCP: Marisel Lancaster MD    Date of Admission: 11/3/2021    Chief Complaint: Radha Valentine, unresponsive at nursing home    Hospital Course:   59 y/o F w/ hx if RASHMI, COPD with baseline O2 2-0Z, systolic HF, DVT (3785), bilateral lower foot wounds w/ partial amputations, osteomyelitis on vancomycin and meropenem, CKD, DM     Recent admissions  -- 10/01 - 10/16 - progressively worsening b/l lower extremity pain for 2 weeks, OR on 10/7 for bilateral lower extremity I&D with left fifth ray resection, right fourth ray resection, right partial cuboid resection. Surgical path from 10/7 overall showed partially treated osteomyelitis. Patient went back to the OR 10/13 for for repeat I&D with delayed primary closure of right lower extremity and I&D with partial cuboid resection and wound vac application. Patient was hypotensive and difficult to arouse after surgery. on BIPAP with improvement in mental status. Recommended to drop dose of Methadone as likely contributing to her encephalopathy  -- 10/18 - 10/20 --  minimally responsive shortly after receiving her typical 140 mg of methadone in the morning. On arrival of the EMS she was given Narcan with immediate improvement in mental status, she was also noted to have glucose in the 30s and was given glucagon with some improvement. noted to have hypoxia and was placed on nonrebreather oxygen as well as given Haldol and Versed for agitation. Insulin doses were adjusted and dc to facility  -- 10/23 - 11/01 -- due to waxing and wanning mental status. Diuresed with Lasix net -5 L, and discharged on torsemide and stopped methadone  - 11/03 --- lethargic at nursing home, not responding to sternal rub, after receiving 2 doses of narcan, intubated in the ED but extubated once in ICU where requiring NRB  - S/P  EGD on 11/10 showed normal first and second part of duodenum. Moderate striped erythema in the gastric antrum and body.     Subjective: Patient reports feeling better. Abdominal pain is almost resolved tolerating diet. Denies nausea, vomiting. No acute event reported overnight. Medications:  Reviewed    Infusion Medications    sodium chloride Stopped (11/10/21 1623)    dextrose      sodium chloride 25 mL (11/11/21 2212)     Scheduled Medications    torsemide  40 mg Oral Daily    amitriptyline  50 mg Oral Nightly    pantoprazole  40 mg Oral BID AC    vancomycin  500 mg IntraVENous Q24H    apixaban  5 mg Oral BID    insulin glargine  18 Units SubCUTAneous Nightly    meropenem  1,000 mg IntraVENous Q12H    insulin lispro  0-18 Units SubCUTAneous TID WC    insulin lispro  0-9 Units SubCUTAneous Nightly    sodium chloride flush  5-40 mL IntraVENous 2 times per day    metoprolol succinate  50 mg Oral Daily     PRN Meds: oxyCODONE, glucose, dextrose, glucagon (rDNA), dextrose, sodium chloride flush, sodium chloride, ondansetron **OR** ondansetron, polyethylene glycol, acetaminophen **OR** acetaminophen, labetalol, hydrOXYzine, promethazine      Intake/Output Summary (Last 24 hours) at 11/13/2021 1252  Last data filed at 11/13/2021 1045  Gross per 24 hour   Intake 480 ml   Output 800 ml   Net -320 ml       Physical Exam Performed:    /72   Pulse 76   Temp 98.1 °F (36.7 °C) (Axillary)   Resp 17   Ht 5' (1.524 m)   Wt 178 lb 5.6 oz (80.9 kg)   LMP 10/23/2015   SpO2 95%   BMI 34.83 kg/m²     Constitutional:  Chronically ill appearing, NAD  HENT: Normocephalic and atraumatic. Extraocular movements intact. Conjunctivae normal.   Cardiovascular: Normal rate and regular rhythm with no murmur/gallops and rubs  Pulmonary: Normal respiratory effort. Bilateral clear to auscultate, no wheeze no rhonchi. Abdominal: No tenderness, soft, BS +ve  Musculoskeletal: BLEs wrapped in ace bandages. Lower extremity with wound VAC  Skin: Skin is warm and dry. Neurological: Alert and oriented x3. Following commands.   Grossly nonfocal  Psychiatric: Mood normal.     I have examined patient on 11/13/2021 no significant changes in exam done on 11/12/2021    Labs:   Recent Labs     11/11/21  0549 11/12/21  0630 11/13/21  0639   WBC 5.5 5.1 4.8   HGB 9.2* 9.4* 9.2*   HCT 28.1* 28.8* 27.9*    380 359     Recent Labs     11/11/21  0549 11/12/21  0630 11/13/21  0639    140 136   K 4.4 4.1 4.5   CL 99 100 97*   CO2 31 31 27   BUN 50* 49* 54*   CREATININE 1.8* 1.6* 1.7*   CALCIUM 9.1 8.9 8.8   PHOS 4.6 3.9 4.6     No results for input(s): AST, ALT, BILIDIR, BILITOT, ALKPHOS in the last 72 hours. No results for input(s): INR in the last 72 hours. No results for input(s): Taylor Dross in the last 72 hours. Urinalysis:      Lab Results   Component Value Date    NITRU Negative 10/23/2021    WBCUA None seen 10/23/2021    BACTERIA Rare 10/23/2021    RBCUA 0-2 10/23/2021    BLOODU Negative 10/23/2021    SPECGRAV 1.020 10/23/2021    GLUCOSEU 100 10/23/2021       Radiology:  XR CHEST PORTABLE   Final Result   1. Stable chest      XR CHEST PORTABLE   Final Result   Impression: Interval extubation. Stable appearance of the lungs. CT Head WO Contrast   Final Result      No acute intracranial pathology                 XR CHEST PORTABLE   Final Result   1. Endotracheal tube tip just directed at the right mainstem bronchus. Catheter can be withdrawn 1.5 to 2 cm.    2. Multifocal airspace disease      Critical finding reported to  Physician: Trang Thompson  at 3:18 PM on 11/3/2021, and he confirmed that the tube has been repositioned after review of the above radiograph               Assessment/Plan:    Active Hospital Problems    Diagnosis Date Noted    Acute encephalopathy [G93.40] 10/18/2021    Type 2 diabetes mellitus with left diabetic foot infection ([facility] [X23.970, L08.9]     Type 2 diabetes mellitus with right diabetic foot infection ([address] [O71.868, L08.9] 03/12/2021    Chronic osteomyelitis of right foot with draining sinus [facility]) [M86.471] 06/03/2019    Diabetic polyneuropathy associated with type 2 diabetes mellitus ([facility] [E11.42] 03/26/2019    Open wound of right foot [S91.301A] 07/03/2018     #Acute respiratory failure with hypoxemia, intubated in the emergency room, extubated once was in ICU to NRB. Required Vapotherm currently on 8L of oxygen  #Pulmonary edema  Her baseline oxygen is 3-4 L NC  Monitor sats, wean as tolerated to keep sats more than 90%. Discussed with RN   Continue antibiotics broad-spectrum vancomycin and Merrem  Discussed with nephrology patient appears to be euvolemic we will transition to torsemide. #Abdominal pain  Continue Protonix 40 twice daily. S/p EGD on 11/10 showed moderate striped erythema in the gastric antrum and body. #Acute encephalopathy toxic due to narcotic overdose, improved with Narcan use. She was asked to stop methadone on discharge on 11/01  Pharmacy verified med list with nursing home she was not getting methadone at facility. Avoid narcotics    #NSTEMI type II due to demand ischemia,   EKG trend flat, no EKG changes critical ischemia or infarction    #Chronic DVTs  Was placed on heparin drip on admission  Continue Eliquis    #Diabetic foot ulceration/chronic osteomyelitis  High fever on admission, ID following, continue broad-spectrum antibiotics till 11/26  Right lower extremity with wound VAC   Wound care per podiatry. Okay to DC per podiatry    #FAHEEM on CKD stage IV, on discharge creatinine was 1.6 (11/01)  Creatinine stable at 1.7> 2.0 due to diuresis. Addition to oral torsemide, monitor renal profile daily    #Chronic diastolic congestive heart failure   Improved proBNP actually lower than discharge on 11/1  Continue diuresis    #Type 2 diabetes  Monitor blood glucose, elevated  Change Lantus to 20 units. We will add Humalog 5 units with meals. Continue correctional dose of Humalog as well.   Hypoglycemia protocol    DVT Prophylaxis: Eliquis  Diet: ADULT DIET; Regular  ADULT ORAL NUTRITION SUPPLEMENT; Dinner; Fortified Pudding Oral Supplement  Code Status: Full Code    PT/OT Eval Status:  In progress    Dispo -patient is medically stable to be discharged pending pre-CERT    This chart was likely completed using voice recognition technology and may contain unintended grammatical , phraseology,and/or punctuation errors    Osmel Victor MD  Hospitalist

## 2021-11-13 NOTE — PROGRESS NOTES
Podiatric Surgery Daily Progress Note  Catie Blanco      Subjective :   Patient seen and examined this am at the bedside. Patient resting comfortably. Awakens to verbal stimuli. Denies any acute overnight events. Patient denies N/V/F/C/SOB. Patient denies calf pain, thigh pain, chest pain. Review of Systems: A 12 point review of symptoms is unremarkable with the exception of the chief complaint. Patient specifically denies nausea, fever, vomiting, chills, shortness of breath, chest pain, abdominal pain, constipation or difficulty urinating. Objective     BP (!) 112/58   Pulse 69   Temp 97.1 °F (36.2 °C) (Oral)   Resp 16   Ht 5' (1.524 m)   Wt 178 lb 5.6 oz (80.9 kg)   LMP 10/23/2015   SpO2 (!) 89%   BMI 34.83 kg/m²     I/O:    Intake/Output Summary (Last 24 hours) at 11/13/2021 0558  Last data filed at 11/12/2021 2054  Gross per 24 hour   Intake    Output 200 ml   Net -200 ml              Wt Readings from Last 3 Encounters:   11/10/21 178 lb 5.6 oz (80.9 kg)   10/30/21 200 lb 13.4 oz (91.1 kg)   10/20/21 223 lb 1.7 oz (101.2 kg)       LABS:    Recent Labs     11/11/21  0549 11/12/21  0630   WBC 5.5 5.1   HGB 9.2* 9.4*   HCT 28.1* 28.8*    380        Recent Labs     11/12/21  0630      K 4.1      CO2 31   PHOS 3.9   BUN 49*   CREATININE 1.6*        No results for input(s): PROT, INR, APTT in the last 72 hours. LOWER EXTREMITY EXAMINATION    Dressing to bilateral LE intact. No strikethrough noted to the external dressing. Scant sanguinous drainage noted to the internal layers of the dressing. VASCULAR: DP and PT pulses nonpalpable. Upon hand-held Doppler examination, biphasic signals noted to DP and PT bilateral.  CFT is brisk to the digits of the foot b/l. Skin temperature is warm to cool from proximal to distal with no focal calor noted. No edema noted to b/l LE.  No pain with calf compression b/l.     NEUROLOGIC:  Gross and epicritic sensation diminished bilateral. Protective sensation absent at all pedal sites bilateral.     DERMATOLOGIC: Diffuse dermatologic changes noted bilateral lower extremity.     Right lower extremity:  Full-thickness ulceration noted to the plantar aspect of the cuboid with granular base and slightly macerated rim. Wound measures approximately 4.4 cm x 2.8 cm x 0.8 cm. No fluctuance, crepitus, erythema, malodor, or drainage noted. Wound does not probe to bone, tunnel, or track.     Left lower extremity:  Surgical incision noted to the lateral aspect of the left foot with skin edges coapted. Diffuse xerosis noted periincisional. No fluctuance, crepitus, erythema, drainage, or malodor noted.     Full-thickness ulceration noted to the plantar aspect of the fourth metatarsal head with fibrotic base and macerated rim. Measures 2.4 cm x 2.1 cm x 0.1 cm. No fluctuance, crepitus, erythema, malodor, or drainage noted. Wound does not probe to bone, tunnel, or track. MUSCULOSKELETAL: Muscle strength 4/5 for all pedal compartments tested. No pain with palpation of the foot or ankle b/l. Ankle joint ROM is decreased in dorsiflexion with the knee extended. History of hallux amputation of fifth ray resection left foot. History of transmetatarsal amputation with fourth and fifth ray resections right foot. ASSESSMENT/PLAN  -S/p I&D, partial cuboid resection of right foot, I&D with delayed primary closure of left foot 10/13/2021  -Diabetic foot ulceration, right foot, Carpenter 2  -Diabetic foot ulceration, left foot, Carpenter 1  -Peripheral vascular disease, bilateral lower extremity  -Edema, bilateral lower extremity  -Type 2 diabetes mellitus with peripheral neuropathy  -History of osteomyelitis, bilateral lower extremity  -History of multiple pedal amputations, bilateral lower extremity  -History of noncompliance with weightbearing status     -Patient examined and evaluated at the bedside   -Hypotensive otherwise VSS. No Leukocytosis noted (WBC 4.8).  -Hemoglobin A1c 8.3%  -Dressing applied to left lower extremity consisting of wet-to-dry gauze, Kerlix, Ace bandage  -Dressing applied to the right lower extremity consisting wet-to-dry gauze, Kerlix, Ace bandage  -Continue antibiotics per the recommendations of Dr. Roberto Penny, vancomycin and meropenem through 11/26/2021  -Prevalon boots reapplied to bilateral lower extremity. To be applied to bilateral lower extremity at all times of sitting/laying.  -Nonweightbearing to bilateral lower extremity      DISPO: Diabetic foot ulceration, bilateral lower extremity; clinically stable at this time. Continue IV antibiotics per prior ID recommendations. Strict nonweightbearing to bilateral lower extremity, patient will benefit from SNF placement. Patient okay for discharge from podiatric standpoint pending medical clearance.     Discussed assessment and plan with Dr. Kaylin Osuna DPM.    John Canales DPM  Podiatric Resident, PGY-2  Pager #: [phone] or Perfect Serve

## 2021-11-13 NOTE — PLAN OF CARE
Problem: Falls - Risk of:  Goal: Will remain free from falls  Note: High fall risk d/t generalized weakness and BLE wounds. NWB to BLE. Bed locked in lowest position. 3/4 Bed rails up. Call light within reach. Pt belongings within reach. Bedside table within reach. Pt instructed to use call light prior to getting up. Will continue to monitor. Problem: Skin Integrity:  Goal: Will show no infection signs and symptoms  Note: Pt at risk for skin breakdown. Wounds to BLE, podiatry consulted and performing dressing changes. Redness to buttocks and abraham area. Incontinent of urine, purewick in place. Pt repositioned every 2 hours and as needed. Heels raised off the bed.  Will continue to monitor for any new s/s of breakdown

## 2021-11-13 NOTE — PROGRESS NOTES
Nephrology  Note                                                                                                                                                                                                                                                                                                                                                               Office : [phone]     Fax :[phone]              Patient's Name: Sita Ruff  2021    Reason for Consult:  Edema   Requesting Physician:  Rich Nunez MD      Good diuresis   Cr stable   No SOB          Past Medical History:   Diagnosis Date    Asthma 2004    Bacterial vaginosis 2008    Carpal tunnel syndrome 2007    [facility]     Diabetes mellitus type II 2007    10/1/20 pt states does accucheck 2x/day at home    Diabetic neuropathy ([facility]     Diastolic CHF [facility]     DVT (deep venous thrombosis) ([facility] [address] 2004    Dyslipidemia 2009    Dyspareunia 2009    ETOH abuse 2007    HTN (hypertension)     Hx of blood clots     Hyperlipidemia     MRSA (methicillin resistant staph aureus) culture positive 2017; 2017    foot; leg     Neuropathy 2009    polyneuropathy    Pancreatitis 2004    Tobacco abuse 2008    Uses wheelchair     also uses walker       Past Surgical History:   Procedure Laterality Date     SECTION  unknown    FOOT DEBRIDEMENT Right 2019    INCISION AND DRAINAGE WITH APPLICATION OF STRAVIX GRAFT RIGHT FOOT performed by Deepthi Hines DPM at [address] Right 2019    RIGHT FOOT INCISION AND DRAINAGE WITH STAGING TRANSMETATARSAL AMPUTATION performed by Deepthi Hines DPM at [address] Right 2019    RIGHT FOOT DEBRIDEMENT INCISION AND DRAINAGE, OPEN DIABETIC FOOT ULCER WITH GRAFT PLACEMENT performed by Deepthi Hines DPM at [facility] Left 10/23/2019    LEFT FOOT INCISION AND DRAINAGE , DEBRIDEMENT OF OPEN WOUND, APPLICATION OF STRAVIX GRAFT performed by Eric Vazquez DPM at [address] Right 3/29/2021    INCISION AND DRAINAGE, DEBRIDEMENT OF DIABETIC WOUND WITH PLACEMENT OF STRAVIX GRAFT RIGHT FOOT performed by Eric Vazquez DPM at [address]  10/7/2021    BILATERAL LOWER EXTREMITY INCISION AND DRAINAGE, RIGHT FOOT 4TH RAY RESECTION, LEFT FOOT 5TH RAY RESECTION performed by Eric Vazquez DPM at [address] Bilateral 10/13/2021    REPEAT INCISION AND DRAINAGE OF ALL NONVIABLE SOFT TISSUE AND BONE/ PARTIAL CUBOID RESECTION/ BONY BIOPSY AS NEEDED/ WOUND VAC RIGHT LOWER EXTREMITY performed by Eric Vazquez DPM at [address] IR TUNNELED [address] 5 YEARS  10/5/2021    IR TUNNELED CATHETER PLACEMENT GREATER THAN 5 YEARS 10/5/2021 [facility] SPECIAL PROCEDURES    KNEE SURGERY Left     from falling off ladder -- has screws in place pt report    OTHER SURGICAL HISTORY Left 05/25/2016    I & D left foot    OTHER SURGICAL HISTORY Right 10/20/2017    RIGHT GASTROC LENGTHENING ENDOSCOPIC, INJECTION OF AMNI GRAFT    OTHER SURGICAL HISTORY Right 04/26/2018    Diabetic foot ulcer I&D w/ integra graft application    ID DEBRIDEMENT, SKIN, SUB-Q TISSUE,MUSCLE,BONE,=<20 SQ CM Right 8/17/2018    RIGHT FOOT DEBRIDEMENT INCISION AND DRAINAGE, PARTIAL 5TH RAY AMPUTATION performed by Eric Vazquez DPM at [address] [place] / [address]  7/7003    cryotherapy done on lesion    TOE AMPUTATION Left 02/24/2017    AMPUTATION LEFT GREAT TOE                 TONSILLECTOMY      UPPER GASTROINTESTINAL ENDOSCOPY N/A 11/10/2021    EGD BIOPSY performed by Sherman Davis MD at [facility]       History reviewed. No pertinent family history. reports that she quit smoking about 3 years ago. Her smoking use included cigarettes. She has a 30.00 pack-year smoking history.  She has never used smokeless tobacco. She reports that she does not drink alcohol and does not use drugs.     Allergies:  Sulfa antibiotics    Current Medications:    torsemide (DEMADEX) tablet 40 mg, Daily  0.9 % sodium chloride infusion, Once  amitriptyline (ELAVIL) tablet 50 mg, Nightly  pantoprazole (PROTONIX) tablet 40 mg, BID AC  vancomycin (VANCOCIN) 500 mg in dextrose 5 % 250 mL IVPB, Q24H  apixaban (ELIQUIS) tablet 5 mg, BID  insulin glargine (LANTUS;BASAGLAR) injection pen 18 Units, Nightly  meropenem (MERREM) 1,000 mg in sodium chloride 0.9 % 100 mL IVPB (mini-bag), Q12H  insulin lispro (1 Unit Dial) 0-18 Units, TID WC  insulin lispro (1 Unit Dial) 0-9 Units, Nightly  oxyCODONE (ROXICODONE) immediate release tablet 5 mg, Q4H PRN  glucose (GLUTOSE) 40 % oral gel 15 g, PRN  dextrose 50 % IV solution, PRN  glucagon (rDNA) injection 1 mg, PRN  dextrose 5 % solution, PRN  sodium chloride flush 0.9 % injection 5-40 mL, 2 times per day  sodium chloride flush 0.9 % injection 5-40 mL, PRN  0.9 % sodium chloride infusion, PRN  ondansetron (ZOFRAN-ODT) disintegrating tablet 4 mg, Q8H PRN   Or  ondansetron (ZOFRAN) injection 4 mg, Q6H PRN  polyethylene glycol (GLYCOLAX) packet 17 g, Daily PRN  acetaminophen (TYLENOL) tablet 650 mg, Q6H PRN   Or  acetaminophen (TYLENOL) suppository 650 mg, Q6H PRN  labetalol (NORMODYNE;TRANDATE) injection 10 mg, Q4H PRN  hydrOXYzine (VISTARIL) capsule 50 mg, Q8H PRN  promethazine (PHENERGAN) tablet 25 mg, Q6H PRN  metoprolol succinate (TOPROL XL) extended release tablet 50 mg, Daily        Review of Systems:   14 point ROS obtained but were negative except mentioned in HPI      Physical exam:     Vitals:  /68   Pulse 66   Temp 98 °F (36.7 °C) (Oral)   Resp 16   Ht 5' (1.524 m)   Wt 178 lb 5.6 oz (80.9 kg)   LMP 10/23/2015   SpO2 95%   BMI 34.83 kg/m²   Constitutional:  OAA X3 NAD  Skin: no rash, turgor wnl  Heent:  eomi, mmm  Neck: no bruits or jvd noted  Cardiovascular:  S1, S2 without m/r/g  Respiratory: CTA B without w/r/r  Abdomen:  +bs, soft, nt, nd  Ext: + lower extremity edema  Psychiatric: mood and affect appropriate  Musculoskeletal:  Rom, muscular strength intact    Data:   Labs:  CBC:   Recent Labs     11/11/21  0549 11/12/21  0630 11/13/21  0639   WBC 5.5 5.1 4.8   HGB 9.2* 9.4* 9.2*    380 359     BMP:    Recent Labs     11/11/21  0549 11/12/21  0630 11/13/21  0639    140 136   K 4.4 4.1 4.5   CL 99 100 97*   CO2 31 31 27   BUN 50* 49* 54*   CREATININE 1.8* 1.6* 1.7*   GLUCOSE 132* 85 161*     Ca/Mg/Phos:   Recent Labs     11/11/21  0549 11/12/21 0630 11/13/21  0639   CALCIUM 9.1 8.9 8.8   PHOS 4.6 3.9 4.6     Hepatic: No results for input(s): AST, ALT, ALB, BILITOT, ALKPHOS in the last 72 hours. Troponin: No results for input(s): TROPONINI in the last 72 hours. BNP: No results for input(s): BNP in the last 72 hours. Lipids: No results for input(s): CHOL, TRIG, HDL, LDLCALC, LABVLDL in the last 72 hours. ABGs: No results for input(s): PHART, PO2ART, [id number] in the last 72 hours. INR: No results for input(s): INR in the last 72 hours. UA:No results for input(s): Elnita Nikkie, GLUCOSEU, BILIRUBINUR, Ivette Pinedo, BLOODU, PHUR, PROTEINU, UROBILINOGEN, NITRU, LEUKOCYTESUR, LABMICR, URINETYPE in the last 72 hours. Urine Microscopic: No results for input(s): LABCAST, BACTERIA, COMU, HYALCAST, WBCUA, RBCUA, EPIU in the last 72 hours. Urine Culture: No results for input(s): LABURIN in the last 72 hours. Urine Chemistry: No results for input(s): Valene Mike, PROTEINUR, NAUR in the last 72 hours. IMAGING:  XR CHEST PORTABLE   Final Result   1. Stable chest      XR CHEST PORTABLE   Final Result   Impression: Interval extubation. Stable appearance of the lungs. CT Head WO Contrast   Final Result      No acute intracranial pathology                 XR CHEST PORTABLE   Final Result   1.  Endotracheal tube tip just directed at the right mainstem bronchus. Catheter can be withdrawn 1.5 to 2 cm. 2. Multifocal airspace disease      Critical finding reported to  Physician: Reinaldo Jordan  at 3:18 PM on 11/3/2021, and he confirmed that the tube has been repositioned after review of the above radiograph           Assessment/Plan   1. CKD 3     2. HTN    3. Anemia    4. Acid- base/ Electrolyte imbalance     5.  Edema     Plan   - cont torsemide PO   - Aspiration was big issue as pt was lethargic before she was intubated  - creat at baseline now   - Inc nutrition   - Abx   - Labs in am     Recommend to dose adjust all medications  based on renal functions  Maintain SBP> 90 mmHg   Daily weights   AVOID NSAIDs  Avoid Nephrotoxins  Monitor Intake/Output  Call if significant decrease in urine output                 Thank you for allowing us to participate in care of Clifton Norris MD  Feel free to contact me   [facility] [address]  Office : [phone]  Fax :[phone]

## 2021-11-13 NOTE — PROGRESS NOTES
Clinical Pharmacy Progress Note    Vancomycin - Management by Pharmacy    Consult Date(s): 11/3/21  Consulting Provider(s): Dr Rolando John / Plan  1)  B/L foot infection / osteomyelitis - Vancomycin   Concurrent Antimicrobials: Meropenem   Day of Vanc + Meropenem therapy: To continue through 11/26 per ID   Current Dosing Method:  Trough based dosing  o Therapeutic Goal: Trough ~15 mcg/mL  o Trough 11/10 = 16.5 mcg/mL, therapeutic  o Continue 500 mg IV q24h. o Will plan to check next trough on Monday 11/15 if still here.  Will continue to monitor clinical condition and make adjustments to regimen as appropriate. Thanks for consulting pharmacy! Please call with questions [phone].  Pharmacy Resident   11/13/2021 7:42 AM  _____________________________________________________________________________    Interval update:   Awaiting precert for SNF. Patient denies N/V/F/C/SOB and any calf or thigh pain. Subjective/Objective: Ms. Sakina Ann is a 60 y.o. female with a PMHx significant for osteomyelitis on vanc/meropenem in [place], CKD4, DM, narcotic dependent chronic pain, admitted for AMS which responded to Narcan. In the ED, patient became combative and went into respiratory distress requiring intubation. Per the ED, patient also vomited several times and there is concern for new aspiration PNA. Pharmacy has been consulted to continue patient's vancomycin dosing.     Ht Readings from Last 1 Encounters:   11/08/21 5' (1.524 m)      Wt Readings from Last 1 Encounters:   11/10/21 178 lb 5.6 oz (80.9 kg)       Vancomycin Level(s) / Doses:    Date Time Dose Level / Type of Level Interpretation   11/6 05:14 750mg q24h Random = 23.0mcg/mL Drawn ~19 hr after prior dose given, predicts  and trough 23.8   11/7 03:07  Random = 13.2mcg/mL Intermittent dosing   11/8 05:29  Random = 13.9 mcg/mL Will schedule 500 mg IV q24h   11/10 08:35 500 mg q24h Trough = 16.5 mcg/mL Continue 500 mg IV q24h   Note: Serum levels collected for AUC-based dosing may be high if collected in close proximity to the dose administered. This is not necessarily an indicator of toxicity. Cultures & Sensitivities:    Date Site Micro Susceptibility / Result   11/3 Blood x2 No growth to date    11/4 Urine No growth      Labs / Ancillary Data:    Estimated Creatinine Clearance: 36 mL/min (A) (based on SCr of 1.6 mg/dL (H)).     Recent Labs     11/11/21  0549 11/12/21  0630 11/13/21  0639   CREATININE 1.8* 1.6*  --    BUN 50* 49*  --    WBC 5.5 5.1 4.8

## 2021-11-14 NOTE — PROGRESS NOTES
Podiatric Surgery Daily Progress Note  Bostwicklucrecia Donaldson      Subjective :   Patient seen and examined this am at the bedside. Denies any acute overnight events. Patient denies N/V/F/C/SOB. Patient denies calf pain, thigh pain, chest pain. Review of Systems: A 12 point review of symptoms is unremarkable with the exception of the chief complaint. Patient specifically denies nausea, fever, vomiting, chills, shortness of breath, chest pain, abdominal pain, constipation or difficulty urinating. Objective     BP (!) 162/77   Pulse 84   Temp 97 °F (36.1 °C) (Oral)   Resp 18   Ht 5' (1.524 m)   Wt 178 lb 5.6 oz (80.9 kg)   LMP 10/23/2015   SpO2 95%   BMI 34.83 kg/m²     I/O:    Intake/Output Summary (Last 24 hours) at 11/14/2021 0756  Last data filed at 11/14/2021 0452  Gross per 24 hour   Intake 600 ml   Output 450 ml   Net 150 ml              Wt Readings from Last 3 Encounters:   11/10/21 178 lb 5.6 oz (80.9 kg)   10/30/21 200 lb 13.4 oz (91.1 kg)   10/20/21 223 lb 1.7 oz (101.2 kg)       LABS:    Recent Labs     11/13/21  0639 11/14/21  0620   WBC 4.8 6.3   HGB 9.2* 9.8*   HCT 27.9* 29.9*    382        Recent Labs     11/14/21  0620      K 4.6      CO2 28   PHOS 4.0   BUN 50*   CREATININE 1.6*        No results for input(s): PROT, INR, APTT in the last 72 hours. LOWER EXTREMITY EXAMINATION    Dressing to bilateral LE intact. No strikethrough noted to the external dressing. Scant sanguinous drainage noted to the internal layers of the dressing. VASCULAR: DP and PT pulses nonpalpable. Upon hand-held Doppler examination, biphasic signals noted to DP and PT bilateral.  CFT is brisk to the digits of the foot b/l. Skin temperature is warm to cool from proximal to distal with no focal calor noted. No edema noted to b/l LE.  No pain with calf compression b/l.     NEUROLOGIC:  Gross and epicritic sensation diminished bilateral.  Protective sensation absent at all pedal sites bilateral.     DERMATOLOGIC: Diffuse dermatologic changes noted bilateral lower extremity.     Right lower extremity:  Full-thickness ulceration noted to the plantar aspect of the cuboid with granular base and slightly macerated rim. Wound measures approximately 4.4 cm x 2.8 cm x 0.3 cm. No fluctuance, crepitus, erythema, malodor, or drainage noted. Wound does not probe to bone, tunnel, or track.     Left lower extremity:  Surgical incision noted to the lateral aspect of the left foot with skin edges coapted. Diffuse xerosis noted periincisional. No fluctuance, crepitus, erythema, drainage, or malodor noted.     Full-thickness ulceration noted to the plantar aspect of the fourth metatarsal head with fibrotic base and macerated rim. Measures 1.4 cm x 1.2 cm x 0.1 cm. No fluctuance, crepitus, erythema, malodor, or drainage noted. Wound does not probe to bone, tunnel, or track. MUSCULOSKELETAL: Muscle strength 4/5 for all pedal compartments tested. No pain with palpation of the foot or ankle b/l. Ankle joint ROM is decreased in dorsiflexion with the knee extended. History of hallux amputation of fifth ray resection left foot. History of transmetatarsal amputation with fourth and fifth ray resections right foot. ASSESSMENT/PLAN  -S/p I&D, partial cuboid resection of right foot, I&D with delayed primary closure of left foot 10/13/2021  -Diabetic foot ulceration, right foot, Carpenter 2  -Diabetic foot ulceration, left foot, Carpenter 1  -Peripheral vascular disease, bilateral lower extremity  -Edema, bilateral lower extremity  -Type 2 diabetes mellitus with peripheral neuropathy  -History of osteomyelitis, bilateral lower extremity  -History of multiple pedal amputations, bilateral lower extremity  -History of noncompliance with weightbearing status     -Patient examined and evaluated at the bedside   -Hypertensive otherwise VSS. No leukocytosis noted (WBC 6.3).   -Hemoglobin A1c 8.3%  -Dressing applied to left lower extremity consisting of wet-to-dry gauze, Kerlix, Ace bandage  -Dressing applied to the right lower extremity consisting wet-to-dry gauze, Kerlix, Ace bandage  -Continue antibiotics per the recommendations of Dr. Trisha Glynn, vancomycin and meropenem through 11/26/2021  -Prevalon boots reapplied to bilateral lower extremity. To be applied to bilateral lower extremity at all times of sitting/laying.  -Nonweightbearing to bilateral lower extremity      DISPO: Diabetic foot ulceration, bilateral lower extremity; clinically stable at this time. Continue IV antibiotics per prior ID recommendations. Strict nonweightbearing to bilateral lower extremity, patient will benefit from SNF placement. Patient okay for discharge from podiatric standpoint pending medical clearance.     Discussed assessment and plan with Dr. Roxy Arias DPM.    Sukhi Craig DPM  Podiatric Resident, PGY-2  Pager #: [phone] or [employer]

## 2021-11-14 NOTE — PROGRESS NOTES
Blood sugar prior to dinner is 39. Immediate recheck of 50. Pt asymptomatic and eating snack. Will recheck. Held due humalog. Notified Dr. Brayden Victoria.

## 2021-11-14 NOTE — PROGRESS NOTES
Clinical Pharmacy Progress Note    Vancomycin - Management by Pharmacy    Consult Date(s): 11/3/21  Consulting Provider(s): Dr Hardeep Florez / Plan  1)  B/L foot infection / osteomyelitis - Vancomycin   Concurrent Antimicrobials: Meropenem   Day of Vanc + Meropenem therapy: To continue through 11/26 per ID   Current Dosing Method:  Trough based dosing  o Therapeutic Goal: Trough ~15 mcg/mL  o Trough 11/10 = 16.5 mcg/mL, therapeutic  o Continue 500 mg IV q24h. o Will plan to check next trough on Monday 11/15 if still here.  Will continue to monitor clinical condition and make adjustments to regimen as appropriate. Thanks for consulting pharmacy! Please call with questions [phone]. Sherry Pharmacy Resident   11/14/2021 8:52 AM  __________________________________________________________________________    Interval update:   Awaiting precert for SNF. Patient denies N/V/F/C/SOB and any calf or thigh pain. Subjective/Objective: Ms. Segun Leon is a 60 y.o. female with a PMHx significant for osteomyelitis on vanc/meropenem in [place], CKD4, DM, narcotic dependent chronic pain, admitted for AMS which responded to Narcan. In the ED, patient became combative and went into respiratory distress requiring intubation. Per the ED, patient also vomited several times and there is concern for new aspiration PNA. Pharmacy has been consulted to continue patient's vancomycin dosing.     Ht Readings from Last 1 Encounters:   11/08/21 5' (1.524 m)      Wt Readings from Last 1 Encounters:   11/10/21 178 lb 5.6 oz (80.9 kg)       Vancomycin Level(s) / Doses:    Date Time Dose Level / Type of Level Interpretation   11/6 05:14 750mg q24h Random = 23.0mcg/mL Drawn ~19 hr after prior dose given, predicts  and trough 23.8   11/7 03:07  Random = 13.2mcg/mL Intermittent dosing   11/8 05:29  Random = 13.9 mcg/mL Will schedule 500 mg IV q24h   11/10 08:35 500 mg q24h Trough = 16.5 mcg/mL Continue 500 mg IV q24h   Note: Serum levels collected for AUC-based dosing may be high if collected in close proximity to the dose administered. This is not necessarily an indicator of toxicity. Cultures & Sensitivities:    Date Site Micro Susceptibility / Result   11/3 Blood x2 No growth to date    11/4 Urine No growth      Labs / Ancillary Data:    Estimated Creatinine Clearance: 36 mL/min (A) (based on SCr of 1.6 mg/dL (H)).     Recent Labs     11/12/21  0630 11/13/21  0639 11/14/21  0620   CREATININE 1.6* 1.7* 1.6*   BUN 49* 54* 50*   WBC 5.1 4.8 6.3

## 2021-11-14 NOTE — PROGRESS NOTES
Nephrology  Note                                                                                                                                                                                                                                                                                                                                                               Office : [phone]     Fax :[phone]              Patient's Name: Mara Jose  2021    Reason for Consult:  Edema   Requesting Physician:  Phyllis Walker MD      Good diuresis   Cr stable   No SOB          Past Medical History:   Diagnosis Date    Asthma 2004    Bacterial vaginosis 2008    Carpal tunnel syndrome 2007    [facility]     Diabetes mellitus type II 2007    10/1/20 pt states does accucheck 2x/day at home    Diabetic neuropathy ([facility] 3111    Diastolic CHF ([facility]     DVT (deep venous thrombosis) ([facility] 2004    Dyslipidemia 2009    Dyspareunia 2009    ETOH abuse 2007    HTN (hypertension)     Hx of blood clots     Hyperlipidemia     MRSA (methicillin resistant staph aureus) culture positive 2017; 2017    foot; leg     Neuropathy 2009    polyneuropathy    Pancreatitis 2004    Tobacco abuse 2008    Uses wheelchair     also uses walker       Past Surgical History:   Procedure Laterality Date     SECTION  unknown    FOOT DEBRIDEMENT Right 2019    INCISION AND DRAINAGE WITH APPLICATION OF STRAVIX GRAFT RIGHT FOOT performed by Leticia Murray DPM at [address] [place] [address] Right 2019    RIGHT FOOT INCISION AND DRAINAGE WITH STAGING TRANSMETATARSAL AMPUTATION performed by Leticia Murray DPM at [address] [place] [address] Right 2019    RIGHT FOOT DEBRIDEMENT INCISION AND DRAINAGE, OPEN DIABETIC FOOT ULCER WITH GRAFT PLACEMENT performed by Leticia Murray DPM at [address] [place] [address] Left 10/23/2019    LEFT FOOT INCISION AND DRAINAGE , DEBRIDEMENT OF OPEN WOUND, APPLICATION OF STRAVIX GRAFT performed by Cassidy Braden DPM at [address] [place] [address] Right 3/29/2021    INCISION AND DRAINAGE, DEBRIDEMENT OF DIABETIC WOUND WITH PLACEMENT OF STRAVIX GRAFT RIGHT FOOT performed by Cassidy Braden DPM at [address] [place] [address]  10/7/2021    BILATERAL LOWER EXTREMITY INCISION AND DRAINAGE, RIGHT FOOT 4TH RAY RESECTION, LEFT FOOT 5TH RAY RESECTION performed by Cassidy Braden DPM at [address] [place] [address] Bilateral 10/13/2021    REPEAT INCISION AND DRAINAGE OF ALL NONVIABLE SOFT TISSUE AND BONE/ PARTIAL CUBOID RESECTION/ BONY BIOPSY AS NEEDED/ WOUND VAC RIGHT LOWER EXTREMITY performed by Cassidy Braden DPM at [address] IR TUNNELED [address] 5 YEARS  10/5/2021    IR TUNNELED CATHETER PLACEMENT GREATER THAN 5 YEARS 10/5/2021 [facility] PROCEDURES    KNEE SURGERY Left     from falling off ladder -- has screws in place pt report    OTHER SURGICAL HISTORY Left 05/25/2016    I & D left foot    OTHER SURGICAL HISTORY Right 10/20/2017    RIGHT GASTROC LENGTHENING ENDOSCOPIC, INJECTION OF AMNI GRAFT    OTHER SURGICAL HISTORY Right 04/26/2018    Diabetic foot ulcer I&D w/ integra graft application    NH DEBRIDEMENT, SKIN, SUB-Q TISSUE,MUSCLE,BONE,=<20 SQ CM Right 8/17/2018    RIGHT FOOT DEBRIDEMENT INCISION AND DRAINAGE, PARTIAL 5TH RAY AMPUTATION performed by Cassidy Braden DPM at [address] / [address]  7/7003    cryotherapy done on lesion    TOE AMPUTATION Left 02/24/2017    AMPUTATION LEFT GREAT TOE                 TONSILLECTOMY      UPPER GASTROINTESTINAL ENDOSCOPY N/A 11/10/2021    EGD BIOPSY performed by Dayan Alejandro MD at [facility] ENDOSCOPY         Current Medications:    insulin glargine (LANTUS;BASAGLAR) injection pen 30 Units, Nightly  insulin lispro (1 Unit Dial) 10 Units, TID WC  torsemide (DEMADEX) tablet 40 mg, Daily  0.9 % sodium chloride infusion, Once  amitriptyline (ELAVIL) tablet 50 mg, Nightly  pantoprazole (PROTONIX) tablet 40 mg, BID AC  vancomycin (VANCOCIN) 500 mg in dextrose 5 % 250 mL IVPB, Q24H  apixaban (ELIQUIS) tablet 5 mg, BID  meropenem (MERREM) 1,000 mg in sodium chloride 0.9 % 100 mL IVPB (mini-bag), Q12H  insulin lispro (1 Unit Dial) 0-18 Units, TID WC  insulin lispro (1 Unit Dial) 0-9 Units, Nightly  oxyCODONE (ROXICODONE) immediate release tablet 5 mg, Q4H PRN  glucose (GLUTOSE) 40 % oral gel 15 g, PRN  dextrose 50 % IV solution, PRN  glucagon (rDNA) injection 1 mg, PRN  dextrose 5 % solution, PRN  sodium chloride flush 0.9 % injection 5-40 mL, 2 times per day  sodium chloride flush 0.9 % injection 5-40 mL, PRN  0.9 % sodium chloride infusion, PRN  ondansetron (ZOFRAN-ODT) disintegrating tablet 4 mg, Q8H PRN   Or  ondansetron (ZOFRAN) injection 4 mg, Q6H PRN  polyethylene glycol (GLYCOLAX) packet 17 g, Daily PRN  acetaminophen (TYLENOL) tablet 650 mg, Q6H PRN   Or  acetaminophen (TYLENOL) suppository 650 mg, Q6H PRN  labetalol (NORMODYNE;TRANDATE) injection 10 mg, Q4H PRN  hydrOXYzine (VISTARIL) capsule 50 mg, Q8H PRN  promethazine (PHENERGAN) tablet 25 mg, Q6H PRN  metoprolol succinate (TOPROL XL) extended release tablet 50 mg, Daily          Physical exam:     Vitals:  /74   Pulse 88   Temp 99.4 °F (37.4 °C) (Oral)   Resp 18   Ht 5' (1.524 m)   Wt 178 lb 5.6 oz (80.9 kg)   LMP 10/23/2015   SpO2 93%   BMI 34.83 kg/m²   Constitutional:  OAA X3 NAD  Skin: no rash, turgor wnl  Heent:  eomi, mmm  Neck: no bruits or jvd noted  Cardiovascular:  S1, S2 without m/r/g  Respiratory: CTA B without w/r/r  Abdomen:  +bs, soft, nt, nd  Ext: + lower extremity edema      Data:   Labs:  CBC:   Recent Labs     11/12/21  0630 11/13/21  0639 11/14/21  0620   WBC 5.1 4.8 6.3   HGB 9.4* 9.2* 9.8*    359 382     BMP:    Recent Labs     11/12/21  0630 11/13/21  0639 11/14/21  0620    136 141   K 4.1 4.5 4.6    97* 102   CO2 31 27 28   BUN 49* 54* 50*   CREATININE 1.6* 1.7* 1.6*   GLUCOSE 85 161* 116*     Ca/Mg/Phos:   Recent Labs     11/12/21  0630 11/13/21  0639 11/14/21  0620   CALCIUM 8.9 8.8 8.8   PHOS 3.9 4.6 4.0     Hepatic: No results for input(s): AST, ALT, ALB, BILITOT, ALKPHOS in the last 72 hours. Troponin: No results for input(s): TROPONINI in the last 72 hours. BNP: No results for input(s): BNP in the last 72 hours. Lipids: No results for input(s): CHOL, TRIG, HDL, LDLCALC, LABVLDL in the last 72 hours. ABGs: No results for input(s): PHART, PO2ART, QNJ5EXQ in the last 72 hours. INR: No results for input(s): INR in the last 72 hours. UA:No results for input(s): Majel Taylor, GLUCOSEU, BILIRUBINUR, Vergia Schimke, BLOODU, PHUR, PROTEINU, UROBILINOGEN, NITRU, LEUKOCYTESUR, LABMICR, URINETYPE in the last 72 hours. Urine Microscopic: No results for input(s): LABCAST, BACTERIA, COMU, HYALCAST, WBCUA, RBCUA, EPIU in the last 72 hours. Urine Culture: No results for input(s): LABURIN in the last 72 hours. Urine Chemistry: No results for input(s): Danny Shine, PROTEINUR, NAUR in the last 72 hours. IMAGING:  XR CHEST PORTABLE   Final Result   1. Stable chest      XR CHEST PORTABLE   Final Result   Impression: Interval extubation. Stable appearance of the lungs. CT Head WO Contrast   Final Result      No acute intracranial pathology                 XR CHEST PORTABLE   Final Result   1. Endotracheal tube tip just directed at the right mainstem bronchus. Catheter can be withdrawn 1.5 to 2 cm. 2. Multifocal airspace disease      Critical finding reported to  Physician: Rajesh Rob  at 3:18 PM on 11/3/2021, and he confirmed that the tube has been repositioned after review of the above radiograph           Assessment/Plan   1. CKD 3     2. HTN    3. Anemia    4. Acid- base/ Electrolyte imbalance     5.  Edema     Plan   - cont torsemide PO   - BP well controlled - Aspiration was big issue as pt was lethargic before she was intubated  - creat at baseline now   - Inc nutrition   - Abx   - Labs in am     Recommend to dose adjust all medications  based on renal functions  Maintain SBP> 90 mmHg   Daily weights   AVOID NSAIDs  Avoid Nephrotoxins  Monitor Intake/Output  Call if significant decrease in urine output                 Thank you for allowing us to participate in care of Solo Lucero MD  Feel free to contact me   [facility] [address]  Office : [phone]  Fax :[phone]

## 2021-11-14 NOTE — PROGRESS NOTES
Hospitalist Progress Note      PCP: Carlo Kaiser MD    Date of Admission: 11/3/2021    Chief Complaint: Juancarlos Neely, unresponsive at nursing home    Hospital Course:   59 y/o F w/ hx if RASHMI, COPD with baseline O2 3-8R, systolic HF, DVT (3198), bilateral lower foot wounds w/ partial amputations, osteomyelitis on vancomycin and meropenem, CKD, DM     Recent admissions  -- 10/01 - 10/16 - progressively worsening b/l lower extremity pain for 2 weeks, OR on 10/7 for bilateral lower extremity I&D with left fifth ray resection, right fourth ray resection, right partial cuboid resection. Surgical path from 10/7 overall showed partially treated osteomyelitis. Patient went back to the OR 10/13 for for repeat I&D with delayed primary closure of right lower extremity and I&D with partial cuboid resection and wound vac application. Patient was hypotensive and difficult to arouse after surgery. on BIPAP with improvement in mental status. Recommended to drop dose of Methadone as likely contributing to her encephalopathy  -- 10/18 - 10/20 --  minimally responsive shortly after receiving her typical 140 mg of methadone in the morning. On arrival of the EMS she was given Narcan with immediate improvement in mental status, she was also noted to have glucose in the 30s and was given glucagon with some improvement. noted to have hypoxia and was placed on nonrebreather oxygen as well as given Haldol and Versed for agitation. Insulin doses were adjusted and dc to facility  -- 10/23 - 11/01 -- due to waxing and wanning mental status. Diuresed with Lasix net -5 L, and discharged on torsemide and stopped methadone  - 11/03 --- lethargic at nursing home, not responding to sternal rub, after receiving 2 doses of narcan, intubated in the ED but extubated once in ICU where requiring NRB  - S/P  EGD on 11/10 showed normal first and second part of duodenum. Moderate striped erythema in the gastric antrum and body. Subjective:  I want to get discharged from the hospital..  Denies abdominal pain, nausea, vomiting. No acute event reported overnight. Tolerating diet    Medications:  Reviewed    Infusion Medications    sodium chloride Stopped (11/10/21 1623)    dextrose      sodium chloride 25 mL (11/11/21 2212)     Scheduled Medications    insulin glargine  30 Units SubCUTAneous Nightly    insulin lispro (1 Unit Dial)  10 Units SubCUTAneous TID WC    torsemide  40 mg Oral Daily    amitriptyline  50 mg Oral Nightly    pantoprazole  40 mg Oral BID AC    vancomycin  500 mg IntraVENous Q24H    apixaban  5 mg Oral BID    meropenem  1,000 mg IntraVENous Q12H    insulin lispro  0-18 Units SubCUTAneous TID WC    insulin lispro  0-9 Units SubCUTAneous Nightly    sodium chloride flush  5-40 mL IntraVENous 2 times per day    metoprolol succinate  50 mg Oral Daily     PRN Meds: oxyCODONE, glucose, dextrose, glucagon (rDNA), dextrose, sodium chloride flush, sodium chloride, ondansetron **OR** ondansetron, polyethylene glycol, acetaminophen **OR** acetaminophen, labetalol, hydrOXYzine, promethazine      Intake/Output Summary (Last 24 hours) at 11/14/2021 1103  Last data filed at 11/14/2021 0452  Gross per 24 hour   Intake 120 ml   Output 450 ml   Net -330 ml       Physical Exam Performed:    /74   Pulse 88   Temp 99.4 °F (37.4 °C) (Oral)   Resp 18   Ht 5' (1.524 m)   Wt 178 lb 5.6 oz (80.9 kg)   LMP 10/23/2015   SpO2 93%   BMI 34.83 kg/m²     Constitutional:  Chronically ill appearing, NAD  HENT: Normocephalic and atraumatic. Extraocular movements intact. Conjunctivae normal.   Cardiovascular: Normal rate and regular rhythm with no murmur/gallops and rubs  Pulmonary: Normal respiratory effort. Bilateral clear to auscultate, no wheeze no rhonchi. Abdominal: No tenderness, soft, BS +ve  Musculoskeletal: BLEs wrapped in ace bandages. Lower extremity with wound VAC  Skin: Skin is warm and dry.    Neurological: Alert and oriented x3.  Following commands. Grossly nonfocal  Psychiatric: Mood normal.     I have examined patient on 11/14/2021 no significant changes in exam done on 11/12/2021    Labs:   Recent Labs     11/12/21  0630 11/13/21  0639 11/14/21  0620   WBC 5.1 4.8 6.3   HGB 9.4* 9.2* 9.8*   HCT 28.8* 27.9* 29.9*    359 382     Recent Labs     11/12/21  0630 11/13/21  0639 11/14/21  0620    136 141   K 4.1 4.5 4.6    97* 102   CO2 31 27 28   BUN 49* 54* 50*   CREATININE 1.6* 1.7* 1.6*   CALCIUM 8.9 8.8 8.8   PHOS 3.9 4.6 4.0     No results for input(s): AST, ALT, BILIDIR, BILITOT, ALKPHOS in the last 72 hours. No results for input(s): INR in the last 72 hours. No results for input(s): Kwesi [place] in the last 72 hours. Urinalysis:      Lab Results   Component Value Date    NITRU Negative 10/23/2021    WBCUA None seen 10/23/2021    BACTERIA Rare 10/23/2021    RBCUA 0-2 10/23/2021    BLOODU Negative 10/23/2021    SPECGRAV 1.020 10/23/2021    GLUCOSEU 100 10/23/2021       Radiology:  XR CHEST PORTABLE   Final Result   1. Stable chest      XR CHEST PORTABLE   Final Result   Impression: Interval extubation. Stable appearance of the lungs. CT Head WO Contrast   Final Result      No acute intracranial pathology                 XR CHEST PORTABLE   Final Result   1. Endotracheal tube tip just directed at the right mainstem bronchus. Catheter can be withdrawn 1.5 to 2 cm.    2. Multifocal airspace disease      Critical finding reported to  Physician: Lind Cheadle  at 3:18 PM on 11/3/2021, and he confirmed that the tube has been repositioned after review of the above radiograph               Assessment/Plan:    Active Hospital Problems    Diagnosis Date Noted    Acute encephalopathy [G93.40] 10/18/2021    Type 2 diabetes mellitus with left diabetic foot infection ([facility] [M03.151, L08.9]     Type 2 diabetes mellitus with right diabetic foot infection ([facility] [address] [K66.213, L08.9] 03/12/2021    Chronic osteomyelitis of right foot with draining sinus [facility]) [M86.471] 06/03/2019    Diabetic polyneuropathy associated with type 2 diabetes mellitus ([facility] [E11.42] 03/26/2019    Open wound of right foot [S91.301A] 07/03/2018     #Acute respiratory failure with hypoxemia, intubated in the emergency room, extubated once was in ICU to NRB. Required Vapotherm currently on 8L of oxygen  #Pulmonary edema  Her baseline oxygen is 3-4 L NC  Monitor sats, wean as tolerated to keep sats more than 90%. Discussed with RN   Continue antibiotics broad-spectrum vancomycin and Merrem  Discussed with nephrology patient appears to be euvolemic we will transition to torsemide. #Abdominal pain  Continue Protonix 40 twice daily. S/p EGD on 11/10 showed moderate striped erythema in the gastric antrum and body. #Acute encephalopathy toxic due to narcotic overdose, improved with Narcan use. She was asked to stop methadone on discharge on 11/01  Pharmacy verified med list with nursing home she was not getting methadone at facility. Avoid narcotics    #NSTEMI type II due to demand ischemia,   EKG trend flat, no EKG changes critical ischemia or infarction    #Chronic DVTs  Was placed on heparin drip on admission  Continue Eliquis    #Diabetic foot ulceration/chronic osteomyelitis  High fever on admission, ID following, continue broad-spectrum antibiotics till 11/26  Right lower extremity with wound VAC   Wound care per podiatry. Okay to DC per podiatry    #FAHEEM on CKD stage IV, on discharge creatinine was 1.6 (11/01)  Creatinine stable at 1.7> 2.0 due to diuresis. Addition to oral torsemide, monitor renal profile daily    #Chronic diastolic congestive heart failure   Improved proBNP actually lower than discharge on 11/1  Continue diuresis    #Type 2 diabetes  Monitor blood glucose, elevated  Change Lantus to 30 units. and Humalog 10 units with meals. Continue correctional dose of Humalog as well.   Hypoglycemia protocol    DVT

## 2021-11-15 NOTE — PROGRESS NOTES
Clinical Pharmacy Progress Note    Vancomycin - Management by Pharmacy    Consult Date(s): 11/3/21  Consulting Provider(s): Dr Yony Israel / Plan  1)  B/L foot infection / osteomyelitis - Vancomycin   Concurrent Antimicrobials: Meropenem   Day of Vanc + Meropenem therapy: To continue through 11/26 per ID   Current Dosing Method:  Trough based dosing  o Therapeutic Goal: Trough ~15 mcg/mL  o Trough 11/10 = 16.8 mcg/mL, drawn ~2 hrs early. Actual 24-hr trough likely ~15mcg/mL (appropriate). o Continue 500 mg IV q24h. o Will plan to check troughs weekly on Mondays unless clinical condition changes necessitating more frequent levels.  Will continue to monitor clinical condition and make adjustments to regimen as appropriate. Please call with questions--  Thanks--  Glenn Montes De Oca, PharmD, BCPS, BCGP  [id number] ([facility])   11/15/2021 11:15 AM  __________________________________________________________________________    Interval update:   Awaiting precert for SNF vs LTAC. Subjective/Objective: Ms. Taiwo Haynes is a 60 y.o. female with a PMHx significant for osteomyelitis on vanc/meropenem in NH, CKD4, DM, narcotic dependent chronic pain, admitted for AMS which responded to Narcan. In the ED, patient became combative and went into respiratory distress requiring intubation. Per the ED, patient also vomited several times and there is concern for new aspiration PNA. Pharmacy has been consulted to continue patient's vancomycin dosing.     Ht Readings from Last 1 Encounters:   11/08/21 5' (1.524 m)      Wt Readings from Last 1 Encounters:   11/15/21 182 lb 5.1 oz (82.7 kg)       Vancomycin Level(s) / Doses:    Date Time Dose Level / Type of Level Interpretation   11/6 05:14 750mg q24h Random = 23.0mcg/mL Drawn ~19 hr after prior dose given, predicts  and trough 23.8   11/7 03:07  Random = 13.2mcg/mL Intermittent dosing   11/8 05:29  Random = 13.9 mcg/mL Start 500 mg IV q24h   11/10 08:35 500 mg q24h Trough = 16.5 mcg/mL Drawn appropriately. 11/15 06:28 500mg q24h Trough = 16.8 mcg/mL Drawn ~2 hr early. Note: Serum levels collected for AUC-based dosing may be high if collected in close proximity to the dose administered. This is not necessarily an indicator of toxicity. Cultures & Sensitivities:    Date Site Micro Susceptibility / Result   11/3 Blood x2 No growth to date    11/4 Urine No growth      Labs / Ancillary Data:    Estimated Creatinine Clearance: 34 mL/min (A) (based on SCr of 1.7 mg/dL (H)).     Recent Labs     11/13/21  0639 11/14/21  0620 11/15/21  0628   CREATININE 1.7* 1.6* 1.7*   BUN 54* 50* 45*   WBC 4.8 6.3 4.9

## 2021-11-15 NOTE — PROGRESS NOTES
Podiatric Surgery Daily Progress Note  Segun Leon      Subjective :   Patient seen and examined this am at the bedside. Denies any acute overnight events. Patient denies N/V/F/C/SOB. Patient denies calf pain, thigh pain, chest pain. Review of Systems: A 12 point review of symptoms is unremarkable with the exception of the chief complaint. Patient specifically denies nausea, fever, vomiting, chills, shortness of breath, chest pain, abdominal pain, constipation or difficulty urinating. Objective     /72   Pulse 70   Temp 97.7 °F (36.5 °C) (Oral)   Resp 16   Ht 5' (1.524 m)   Wt 178 lb 5.6 oz (80.9 kg)   LMP 10/23/2015   SpO2 95%   BMI 34.83 kg/m²     I/O:    Intake/Output Summary (Last 24 hours) at 11/15/2021 0551  Last data filed at 11/15/2021 0401  Gross per 24 hour   Intake 540 ml   Output 1550 ml   Net -1010 ml              Wt Readings from Last 3 Encounters:   11/10/21 178 lb 5.6 oz (80.9 kg)   10/30/21 200 lb 13.4 oz (91.1 kg)   10/20/21 223 lb 1.7 oz (101.2 kg)       LABS:    Recent Labs     11/13/21  0639 11/14/21  0620   WBC 4.8 6.3   HGB 9.2* 9.8*   HCT 27.9* 29.9*    382        Recent Labs     11/14/21  0620      K 4.6      CO2 28   PHOS 4.0   BUN 50*   CREATININE 1.6*        No results for input(s): PROT, INR, APTT in the last 72 hours. LOWER EXTREMITY EXAMINATION    Dressing to bilateral LE intact. No strikethrough noted to the external dressing. Scant sanguinous drainage noted to the internal layers of the dressing. VASCULAR: DP and PT pulses nonpalpable. Upon hand-held Doppler examination, biphasic signals noted to DP and PT bilateral.  CFT is brisk to the digits of the foot b/l. Skin temperature is warm to cool from proximal to distal with no focal calor noted. No edema noted to b/l LE.  No pain with calf compression b/l.     NEUROLOGIC:  Gross and epicritic sensation diminished bilateral.  Protective sensation absent at all pedal sites bilateral.     DERMATOLOGIC: Diffuse dermatologic changes noted bilateral lower extremity.     Right lower extremity:  Full-thickness ulceration noted to the plantar aspect of the cuboid with granular base and slightly macerated rim. Wound measures approximately 4.4 cm x 2.8 cm x 0.3 cm. No fluctuance, crepitus, erythema, malodor, or drainage noted. Wound does not probe to bone, tunnel, or track. Left lower extremity:  Surgical incision noted to the lateral aspect of the left foot with skin edges coapted. Diffuse xerosis noted periincisional. No fluctuance, crepitus, erythema, drainage, or malodor noted.     Full-thickness ulceration noted to the plantar aspect of the fourth metatarsal head with fibrotic base and macerated rim. Measures 1.4 cm x 1.2 cm x 0.1 cm. No fluctuance, crepitus, erythema, malodor, or drainage noted. Wound does not probe to bone, tunnel, or track. MUSCULOSKELETAL: Muscle strength 4/5 for all pedal compartments tested. No pain with palpation of the foot or ankle b/l. Ankle joint ROM is decreased in dorsiflexion with the knee extended. History of hallux amputation of fifth ray resection left foot. History of transmetatarsal amputation with fourth and fifth ray resections right foot. ASSESSMENT/PLAN  -S/p I&D, partial cuboid resection of right foot, I&D with delayed primary closure of left foot 10/13/2021  -Diabetic foot ulceration, right foot, Carpenter 2  -Diabetic foot ulceration, left foot, Carpenter 1  -Peripheral vascular disease, bilateral lower extremity  -Edema, bilateral lower extremity  -Type 2 diabetes mellitus with peripheral neuropathy  -History of osteomyelitis, bilateral lower extremity  -History of multiple pedal amputations, bilateral lower extremity  -History of noncompliance with weightbearing status     -Patient examined and evaluated at the bedside   -VSS.  No leukocytosis noted (WBC 4.9)  -Hemoglobin A1c 8.3%  -Dressing applied to left lower extremity consisting of wet-to-dry gauze, Kerlix, Ace bandage  -Dressing applied to the right lower extremity consisting wet-to-dry gauze, Kerlix, Ace bandage  -Continue antibiotics per the recommendations of Dr. Donna Henderson, vancomycin and meropenem through 11/26/2021  -Prevalon boots reapplied to bilateral lower extremity. To be applied to bilateral lower extremity at all times of sitting/laying.  -Nonweightbearing to bilateral lower extremity      DISPO: Diabetic foot ulceration, bilateral lower extremity; clinically stable at this time. Continue IV antibiotics per prior ID recommendations. Strict nonweightbearing to bilateral lower extremity, patient will benefit from SNF placement. Patient okay for discharge from podiatric standpoint pending medical clearance.     Discussed assessment and plan with Dr. Aramis Rich DPM.    Mike Schaeffer DPM  Podiatric Resident, PGY-2  Pager #: [phone] or Perfect Serve

## 2021-11-15 NOTE — PROGRESS NOTES
Sensitive <=1 mcg/mL   piperacillin-tazobactam Sensitive <=16 mcg/mL   tobramycin Sensitive <=4 mcg/mL      10/2 Wound (not know if L or R): light Ps aeruginosa (R cipro), light 2nd E coli, light Enterococcus faecalis  Pseudomonas aeruginosa   Antibiotic Interpretation ISAAC   cefepime Sensitive 8 mcg/mL   ciprofloxacin Resistant >2 mcg/mL   gentamicin Sensitive <=4 mcg/mL   meropenem Sensitive <=1 mcg/mL   piperacillin-tazobactam Sensitive <=16 mcg/mL   tobramycin Sensitive <=4 mcg/mL      Escherichia coli   Antibiotic Interpretation ISAAC   amoxicillin-clavulanate Intermediate 16/8 mcg/mL   ampicillin Resistant >16 mcg/mL   ceFAZolin Resistant >16 mcg/mL   cefepime Sensitive <=2 mcg/mL   cefTRIAXone Sensitive <=1 mcg/mL   cefuroxime Sensitive 8 mcg/mL   ciprofloxacin Resistant >2 mcg/mL   ertapenem Sensitive <=0.5 mcg/mL   gentamicin Sensitive <=4 mcg/mL   meropenem Sensitive <=1 mcg/mL   piperacillin-tazobactam Sensitive <=16 mcg/mL   trimethoprim-sulfamethoxazole Sensitive <=2/38 mcg/mL      Enterococcus  faecalis   Antibiotic Interpretation ISAAC   ampicillin Sensitive <=2 mcg/mL   vancomycin Sensitive 2 mcg/mL         IMAGIN/4 CXR  Impression:   Interval extubation. Stable appearance of the lungs.     11/3 Head CT  'No acute intracranial pathology'     11/3 CXR  1. Endotracheal tube tip just directed at the right mainstem bronchus. Catheter can be withdrawn 1.5 to 2 cm. 2. Multifocal airspace disease      10/3 L foot MRI  Impression   Osteomyelitis involving the fifth metatarsal and fifth proximal phalanx with extensive osseous destruction. Mild osteomyelitis involving the lateral aspect of the cuboid. Prior partial first digit amputation      10/3 R foot MRI   Impression   Multiple prior amputations.    Soft tissue ulceration lateral to the cuboid with mild acute osteomyelitis involving the lateral base of the fourth metatarsal and more distal plantar aspect of the remaining fourth metatarsal shaft as well as the lateral aspect of the cuboid.        Scheduled Meds:   insulin glargine  30 Units SubCUTAneous Nightly    torsemide  40 mg Oral Daily    amitriptyline  50 mg Oral Nightly    pantoprazole  40 mg Oral BID AC    vancomycin  500 mg IntraVENous Q24H    apixaban  5 mg Oral BID    meropenem  1,000 mg IntraVENous Q12H    insulin lispro  0-18 Units SubCUTAneous TID WC    insulin lispro  0-9 Units SubCUTAneous Nightly    sodium chloride flush  5-40 mL IntraVENous 2 times per day    metoprolol succinate  50 mg Oral Daily       Continuous Infusions:   sodium chloride Stopped (11/10/21 1623)    dextrose      sodium chloride 25 mL (11/15/21 1026)       PRN Meds:  oxyCODONE, glucose, dextrose, glucagon (rDNA), dextrose, sodium chloride flush, sodium chloride, ondansetron **OR** ondansetron, polyethylene glycol, acetaminophen **OR** acetaminophen, labetalol, hydrOXYzine, promethazine      Assessment:     Obesity (BMI 37), DM, neuropathy, HTN, HL, CKD, chronic pain  Hx DM foot infection / surgeries - has R TMA, L hallux resection     L foot wound infection / osteomyelitis   - MRI with 5th MT + prox 5th phalanx destruction, lateral cuboid edema   - OR 10/7 - 5th ray resection; path - 'focal subacute, partially treated osteomyelitis' (L 5th MT)     R foot wound infection / osteomyelitis - limb threatening, possible limb loss / BKA discussed with pt in past, refuses consideration of R BKA   - MRI with cuboid, 4th MT OM   - OR 10/7 - R 4th ray, partial cuboid resection - NOT definitive   - Path with 4th MT acute osteomyelitis, R cuboid 'focal acute osteomyelitis', R cuboid clearance frag 'rare focus of acute osteomyelitis'     - OR 10/13 - L I&D and closure; R I&D, further cuboid resection, VAC   - Cult polymicrobial with Ps aerug (R cipro), E coli, E faecalis     Narcotic dependence  Encephalopathy      Fever   - risk aspiration, line infection, progression pedal / foot infection (had recommended R amputation), fungemia   - RESOLVED    GI - EGD with no significant findings, 'moderate striped erythema in the gastric antrum and body'    Plan:     Cont vancomycin, meropenem     Wound care per Podiatry     See EBONY  After iv antibiotics, will need chronic po suppression (choice will be imperfect given FQ R Pseudomonas, Enterococcus,CKD - will treat with cefuroxime 500 mg po daily)    Medical Decision Making:   The following items were considered in medical decision making:  Discussion of patient care with other providers  Reviewed clinical lab tests  Reviewed radiology tests  Reviewed other diagnostic tests/interventions  Microbiology cultures and other micro tests reviewed      Discussed with pt  Letitia Friedman MD     INFUSION ORDERS:   Vancomycin 500 mg iv daily through 12/8  Meropenem 1 gm iv q 12 through 12/8  - Diagnosis - DM foot osteomyelitis   - First dose given in hospital  - PICC   - Disposition / date discharge  - Check CBC w diff, CMP, ESR, CRP, CK every Mon or Tue - FAX result to [phone]  - Call with antibiotic / infusion issues, [phone]  - Call with any change in status, transfer in or out of a facility or to hospital - [phone]  - No f/u in outpatient ID office necessary

## 2021-11-15 NOTE — PROGRESS NOTES
Hospitalist Progress Note      PCP: Rene Restrepo MD    Date of Admission: 11/3/2021    Chief Complaint: Dee Dee Kelley, unresponsive at nursing home    Hospital Course:   61 y/o F w/ hx if RASHMI, COPD with baseline O2 6-9P, systolic HF, DVT (3494), bilateral lower foot wounds w/ partial amputations, osteomyelitis on vancomycin and meropenem, CKD, DM     Recent admissions  -- 10/01 - 10/16 - progressively worsening b/l lower extremity pain for 2 weeks, OR on 10/7 for bilateral lower extremity I&D with left fifth ray resection, right fourth ray resection, right partial cuboid resection. Surgical path from 10/7 overall showed partially treated osteomyelitis. Patient went back to the OR 10/13 for for repeat I&D with delayed primary closure of right lower extremity and I&D with partial cuboid resection and wound vac application. Patient was hypotensive and difficult to arouse after surgery. on BIPAP with improvement in mental status. Recommended to drop dose of Methadone as likely contributing to her encephalopathy  -- 10/18 - 10/20 --  minimally responsive shortly after receiving her typical 140 mg of methadone in the morning. On arrival of the EMS she was given Narcan with immediate improvement in mental status, she was also noted to have glucose in the 30s and was given glucagon with some improvement. noted to have hypoxia and was placed on nonrebreather oxygen as well as given Haldol and Versed for agitation. Insulin doses were adjusted and dc to facility  -- 10/23 - 11/01 -- due to waxing and wanning mental status. Diuresed with Lasix net -5 L, and discharged on torsemide and stopped methadone  - 11/03 --- lethargic at nursing home, not responding to sternal rub, after receiving 2 doses of narcan, intubated in the ED but extubated once in ICU where requiring NRB  - S/P  EGD on 11/10 showed normal first and second part of duodenum. Moderate striped erythema in the gastric antrum and body.     Subjective: Patient frustrated and states that she wants to go home. Wants me to prescribe methadone for her. States that it was not right of the doctors to discontinue it. Patient thinks that she is okay to be discharged home. She is able to take care of herself. Tried to explain to patient that she is on IV antibiotics and has wound VAC with inability to care for herself. Patient states that she understands but still seems to not comprehend completely. Patient stated that she has help at home I should talk to her daughter Anise Litten at [phone]. Tried [employer], no answer.     Medications:  Reviewed    Infusion Medications    sodium chloride Stopped (11/10/21 1623)    dextrose      sodium chloride 25 mL (11/15/21 0904)     Scheduled Medications    insulin glargine  30 Units SubCUTAneous Nightly    insulin lispro (1 Unit Dial)  10 Units SubCUTAneous TID WC    torsemide  40 mg Oral Daily    amitriptyline  50 mg Oral Nightly    pantoprazole  40 mg Oral BID AC    vancomycin  500 mg IntraVENous Q24H    apixaban  5 mg Oral BID    meropenem  1,000 mg IntraVENous Q12H    insulin lispro  0-18 Units SubCUTAneous TID WC    insulin lispro  0-9 Units SubCUTAneous Nightly    sodium chloride flush  5-40 mL IntraVENous 2 times per day    metoprolol succinate  50 mg Oral Daily     PRN Meds: oxyCODONE, glucose, dextrose, glucagon (rDNA), dextrose, sodium chloride flush, sodium chloride, ondansetron **OR** ondansetron, polyethylene glycol, acetaminophen **OR** acetaminophen, labetalol, hydrOXYzine, promethazine      Intake/Output Summary (Last 24 hours) at 11/15/2021 0915  Last data filed at 11/15/2021 0859  Gross per 24 hour   Intake 310 ml   Output 1550 ml   Net -1240 ml       Physical Exam Performed:    /67   Pulse 76   Temp 98.6 °F (37 °C) (Oral)   Resp 16   Ht 5' (1.524 m)   Wt 182 lb 5.1 oz (82.7 kg)   LMP 10/23/2015   SpO2 95%   BMI 35.61 kg/m²     Constitutional:  Chronically ill appearing, Assessment/Plan:    Active Hospital Problems    Diagnosis Date Noted    Acute encephalopathy [G93.40] 10/18/2021    Type 2 diabetes mellitus with left diabetic foot infection ([facility] [D96.763, L08.9]     Type 2 diabetes mellitus with right diabetic foot infection ([facility] [F17.980, L08.9] 03/12/2021    Chronic osteomyelitis of right foot with draining sinus [facility]) [M86.471] 06/03/2019    Diabetic polyneuropathy associated with type 2 diabetes mellitus ([facility] [E11.42] 03/26/2019    Open wound of right foot [S91.301A] 07/03/2018     #Acute respiratory failure with hypoxemia, intubated in the emergency room, extubated once was in ICU to NRB. Required Vapotherm currently on 8L of oxygen  #Pulmonary edema  Her baseline oxygen is 3-4 L NC  Monitor sats, wean as tolerated to keep sats more than 90%. Discussed with RN   Continue antibiotics broad-spectrum vancomycin and Merrem  Discussed with nephrology patient appears to be euvolemic we will transition to torsemide. #Abdominal pain  Continue Protonix 40 twice daily. S/p EGD on 11/10 showed moderate striped erythema in the gastric antrum and body. #Acute encephalopathy toxic due to narcotic overdose, improved with Narcan use. She was asked to stop methadone on discharge on 11/01  Pharmacy verified med list with nursing home she was not getting methadone at facility. Avoid narcotics    #NSTEMI type II due to demand ischemia,   EKG trend flat, no EKG changes critical ischemia or infarction    #Chronic DVTs  Was placed on heparin drip on admission  Continue Eliquis    #Diabetic foot ulceration/chronic osteomyelitis  High fever on admission, ID following, continue broad-spectrum antibiotics till 11/26  Right lower extremity with wound VAC   Wound care per podiatry. Okay to DC per podiatry    #FAHEEM on CKD stage IV, on discharge creatinine was 1.6 (11/01)  Creatinine stable at 1.7> 2.0 due to diuresis.   Addition to oral torsemide, monitor renal profile daily    #Chronic diastolic congestive heart failure   Improved proBNP actually lower than discharge on 11/1  Continue diuresis    #Type 2 diabetes  Monitor blood glucose, elevated  Change Lantus to 30 units. and Humalog 10 units with meals. Continue correctional dose of Humalog as well. Hypoglycemia protocol    DVT Prophylaxis: Eliquis  Diet: ADULT DIET; Regular  ADULT ORAL NUTRITION SUPPLEMENT; Dinner; Fortified Pudding Oral Supplement  Code Status: Full Code    PT/OT Eval Status: In progress    Dispo -LTAC denied for insurance. SNF placement started. Discussed with social work.   Patient is medically stable to be discharged pending pre-CERT    This chart was likely completed using voice recognition technology and may contain unintended grammatical , phraseology,and/or punctuation errors    Jimmie Benitez MD  Hospitalist

## 2021-11-15 NOTE — CARE COORDINATION
CM  rcv'd  Call from pt 's [facility] [phone]   no requesting to return to [facility]  ,  [address] spoke with pt and agreeable , Maria E [phone]     Admissions can accept the pt back ; they are ordering wound vac and can accept her tomorrow   CM  Arranged  Transport for  1400  Tues Nov 16 2021 ,  And  Faxed  EBONY for  atbx  And  Wound vac  Care / setting :    CM will flip HENS  , no pre cert  Needed ; Patient agreeable  . D/C  11/16/2021    Electronically signed by Madison Sauceda RN on 11/15/2021 at 2:01 PM     +++++++++++++++++++++++++++++++++++++++++++++++    CM spoke with Dr Lindsey Werner  , Who  declines   to  do   PTP :    ( although  Patient has  Failed  Multiple times  At  SNF  Previously, and would benefit  From  Higher level of  Care)    -  CM notified  Flavia Abby  In admissions  [phone]: that  PTP  Will not  Be pursued . Re: SNF      CM will reach out to  [place] to see if they are still able to accept:    FAY spoke with Luis F Edge  In admissions @  [address] : she is  Following up with facility to confirm if they still have a bed available:    [address]  P: [phone]  F:  [phone]    JACQUELINE  Submitted:  Document ID : [id number]       Electronically signed by Madison Sauceda RN on 11/15/2021 at 11:25 AM     ++++++++++++++++++++++++++++++++++++++++++++++    FAY  Was informed  Dr Rafiq ePrez is off today And  Dr Lindsey Werner is covering this patient:      PS message  Sent to her  :  [facility] was denied and they are questing PTP : I can set up for later today / what number can you be reached at and time frame is good for you/ 11-2 Pm ? 3-5 ?     Electronically signed by Madison Sauceda RN on 11/15/2021 at 10:34 AM     ++++++++++++++++++++++++++++++++++++++++++++++++    CM following for  D/C planning: D/C  LTACH :        FAY linton'harika  called  from [facility] [phone], who states  Pt pre cert was denied  And requesting PTP  : She can  arrange  For that today  , just  Needs  MD  Contact number and times he is available ;     FAY sent  PS message to Dr Nakia Courtney  To update status and  Request  Contact  Number : and  Will call with  Update/determination        Cont to follow for  D/C planning and  Needs:     Patient  will have  Wound Vac, PICC line IV atbx and  Oxygen  ( 4 L nc  ) At  D/C  LTACH  Could  Reduce the  Potential for    Re admission .   [address] [facility]                         [address] [place] [address] Soraya Tena[address], [facility]         Phone: [phone]         Fax: [phone]          Location will depend on bed availability :    Electronically signed by Yakov Mahoney RN on 11/15/2021 at 9:34 AM         Yakov Mahoney RN Case Manager  [facility]  [address] [address] [facility].   [facility] [address] [phone]  Fax [phone]

## 2021-11-15 NOTE — PROGRESS NOTES
PT AM . Messaged Dr. Scottie Abdalla about patient's scheduled Humalog 10 Units. Per Dr. Scottie Abdalla ok to hold.

## 2021-11-16 NOTE — CARE COORDINATION
Case Management Assessment            Discharge Note                    Date / Time of Note: 11/16/2021 9:15 AM                  Discharge Note Completed by: Coni Rondon RN    Patient Name: Yoli Avilez   YOB: 1965  Diagnosis: Acute encephalopathy [G93.40]  Acute hypoxemic respiratory failure ([facility] [J96.01]   Date / Time: 11/3/2021  2:22 PM    Current PCP: Andrew Chaparro MD  Clinic patient: No    Hospitalization in the last 30 days: Yes    Advance Directives:  Code Status: Full Code  [place] DNR form completed and on chart: No    Financial:  Payor: Gordon Marquez / Plan: Gordon Marquez / Product Type: *No Product type* /      Pharmacy:    [id number] [id number], [address] [facility] [phone] - F [phone]  Angelo Orozcoviwilliam [address] [place], [place] [id number]  Phone: [phone] Fax: [phone]      Assistance purchasing medications?:    Assistance provided by Case Management: None at this time    Does patient want to participate in local refill/ meds to beds program?:      Meds To Beds General Rules:  1. Can ONLY be done Monday- Friday between 8:30am-5pm  2. Prescription(s) must be in pharmacy by 3pm to be filled same day  3. Copy of patient's insurance/ prescription drug card and patient face sheet must be sent along with the prescription(s)  4. Cost of Rx cannot be added to hospital bill. If financial assistance is needed, please contact unit  or ;  or  CANNOT provide pharmacy voucher for patients co-pays  5.  Patients can then  the prescription on their way out of the hospital at discharge, or pharmacy can deliver to the bedside if staff is available. (payment due at time of pick-up or delivery - cash, check, or card accepted)     Able to afford home medications/ co-pay costs: Yes    ADLS:  Current PT AM-PAC Score: 8 /24  Current OT AM-PAC Score: 15 /24      DISCHARGE Disposition:   [facility] :     [address]               [address] [facility], [address] [facility] [place] [address]      REPORT Phone: [phone]      Daron Oconnell Fax: [phone]            LOC at discharge: Skilled  EBONY Completed: Yes    Notification completed in HENS/PAS?:  Yes : CM has completed HENS online through secure website for SNF admission at [facility] .    Document ID #: Document ID : [id number]    IMM Completed:   Not Indicated    Transportation:  Transportation PLAN for discharge: EMS transportation   Mode of Transport: Ambulance stretcher - BLS  Reason for medical transport: Bed confined: Meets the following criteria 1) unable to get out of bed without assistance or ambulate, 2) unable to safely sit up in a wheelchair, 3) unable to maintain erect seating position in a chair for time needed for transport  Name of Transport Company: [employer]  Phone: [phone]  Time of Transport: 1600    Transport form completed: Yes    Home Care:  [address] ordered at discharge: No  [address]: Not Applicable  Orders faxed: No    Durable Medical Equipment:  DME Provider: defer  Equipment obtained during hospitalization: defer    Home Oxygen and Respiratory Equipment:  Oxygen needed at discharge?: Yes  [address]: Not Applicable  Portable tank available for discharge?: Yes    Dialysis:  Dialysis patient: No    Dialysis Center:  Not Applicable    Hospice Services:  Location: Not Applicable  Agency: Not Applicable    Consents signed: Not Indicated    Referrals made at [facility] for outpatient continued care:  Not Applicable    Additional CM Notes:     CM confirmed  D/C to SNF  :     JACQUELINE submitted:  Rn to call report    CM  Faxed EBONY / AVS:  Transport arranged  Via Luke Chaidez  In Admissions aware  And confirmed  Will have  Wound vac available to apply upon arrival       The Plan for Transition of Care is related to the following treatment goals of Acute encephalopathy [G93.40]  Acute hypoxemic respiratory failure ([facility] [J96.01]    The Patient and/or patient representative Casey Valencia and her family were provided with a choice of provider and agrees with the discharge plan Yes    Freedom of choice list was provided with basic dialogue that supports the patient's individualized plan of care/goals and shares the quality data associated with the providers.  Yes    Care Transitions patient: No    Julián Sun RN  [facility]  Case Management Department  Ph: [phone]

## 2021-11-16 NOTE — PROGRESS NOTES
ID Follow-up NOTE    CC:   R DFI / OM. Fever, Leukocytosis  Antibiotics: Vancomycin, Meropenem    Admit Date: 11/3/2021  Hospital Day: 14    Subjective:     Pt reports she 'ready to go' leaving later to today to facility (not home)  Does not c/o foot pain      Objective:     Patient Vitals for the past 8 hrs:   BP Temp Temp src Pulse Resp SpO2   11/16/21 1328 131/69 97.9 °F (36.6 °C) Oral 78 16 91 %   11/16/21 0827 107/71 97.7 °F (36.5 °C) Oral 77 16 98 %     I/O last 3 completed shifts: In: 840 [P.O.:840]  Out: 1000 [Urine:1000]  No intake/output data recorded. EXAM:  GENERAL: No apparent distress.   3L  HEENT: Membranes moist, no oral lesion  NECK:  Supple, no lymphadenopathy  LUNGS: Clear b/l, no rales, no dullness  CARDIAC: RRR, no murmur appreciated  ABD:  + BS, soft, nontender  EXT:  Bilateral pedal dressings  NEURO: No focal neurologic findings  PSYCH: Orientation, sensorium, mood normal  LINES:  R chest line in place       Data Review:  Lab Results   Component Value Date    WBC 4.4 11/16/2021    HGB 9.9 (L) 11/16/2021    HCT 29.9 (L) 11/16/2021    MCV 85.0 11/16/2021     11/16/2021     Lab Results   Component Value Date    CREATININE 1.6 (H) 11/16/2021    BUN 46 (H) 11/16/2021     11/16/2021    K 4.8 11/16/2021     11/16/2021    CO2 28 11/16/2021       Hepatic Function Panel:   Lab Results   Component Value Date    ALKPHOS 348 11/03/2021    ALT 48 11/03/2021    AST 90 11/03/2021    PROT 7.3 11/03/2021    PROT 6.6 07/21/2011    BILITOT <0.2 11/03/2021    BILIDIR <0.2 10/26/2021    IBILI see below 10/26/2021    LABALBU 2.3 11/16/2021       MICRO:  11/3 BC no growth  11/3 Urine cult no growth    10/13 Surg cult  - no growth      10/7 Surg cult: GS 1+WBC, no organism; cult rare Ps aeruginosa, rare mixed skin sukhjinder  Antibiotic Interpretation ISAAC   cefepime Sensitive 8 mcg/mL   ciprofloxacin Resistant >2 mcg/mL   gentamicin Sensitive <=4 mcg/mL   meropenem Sensitive <=1 mcg/mL piperacillin-tazobactam Sensitive <=16 mcg/mL   tobramycin Sensitive <=4 mcg/mL      10/2 Wound (not know if L or R): light Ps aeruginosa (R cipro), light 2nd E coli, light Enterococcus faecalis  Pseudomonas aeruginosa   Antibiotic Interpretation ISAAC   cefepime Sensitive 8 mcg/mL   ciprofloxacin Resistant >2 mcg/mL   gentamicin Sensitive <=4 mcg/mL   meropenem Sensitive <=1 mcg/mL   piperacillin-tazobactam Sensitive <=16 mcg/mL   tobramycin Sensitive <=4 mcg/mL      Escherichia coli   Antibiotic Interpretation ISAAC   amoxicillin-clavulanate Intermediate 16/8 mcg/mL   ampicillin Resistant >16 mcg/mL   ceFAZolin Resistant >16 mcg/mL   cefepime Sensitive <=2 mcg/mL   cefTRIAXone Sensitive <=1 mcg/mL   cefuroxime Sensitive 8 mcg/mL   ciprofloxacin Resistant >2 mcg/mL   ertapenem Sensitive <=0.5 mcg/mL   gentamicin Sensitive <=4 mcg/mL   meropenem Sensitive <=1 mcg/mL   piperacillin-tazobactam Sensitive <=16 mcg/mL   trimethoprim-sulfamethoxazole Sensitive <=2/38 mcg/mL      Enterococcus  faecalis   Antibiotic Interpretation ISAAC   ampicillin Sensitive <=2 mcg/mL   vancomycin Sensitive 2 mcg/mL         IMAGIN/4 CXR  Impression:   Interval extubation. Stable appearance of the lungs.     11/3 Head CT  'No acute intracranial pathology'     11/3 CXR  1. Endotracheal tube tip just directed at the right mainstem bronchus. Catheter can be withdrawn 1.5 to 2 cm. 2. Multifocal airspace disease      10/3 L foot MRI  Impression   Osteomyelitis involving the fifth metatarsal and fifth proximal phalanx with extensive osseous destruction. Mild osteomyelitis involving the lateral aspect of the cuboid. Prior partial first digit amputation      10/3 R foot MRI   Impression   Multiple prior amputations.    Soft tissue ulceration lateral to the cuboid with mild acute osteomyelitis involving the lateral base of the fourth metatarsal and more distal plantar aspect of the remaining fourth metatarsal shaft as well as the lateral aspect of the cuboid.        Scheduled Meds:   insulin glargine  30 Units SubCUTAneous Nightly    torsemide  40 mg Oral Daily    amitriptyline  50 mg Oral Nightly    pantoprazole  40 mg Oral BID AC    vancomycin  500 mg IntraVENous Q24H    apixaban  5 mg Oral BID    meropenem  1,000 mg IntraVENous Q12H    insulin lispro  0-18 Units SubCUTAneous TID WC    insulin lispro  0-9 Units SubCUTAneous Nightly    sodium chloride flush  5-40 mL IntraVENous 2 times per day    metoprolol succinate  50 mg Oral Daily       Continuous Infusions:   sodium chloride Stopped (11/10/21 1623)    dextrose      sodium chloride 25 mL (11/16/21 1222)       PRN Meds:  oxyCODONE, glucose, dextrose, glucagon (rDNA), dextrose, sodium chloride flush, sodium chloride, ondansetron **OR** ondansetron, polyethylene glycol, acetaminophen **OR** acetaminophen, labetalol, hydrOXYzine, promethazine      Assessment:     Obesity (BMI 37), DM, neuropathy, HTN, HL, CKD, chronic pain  Hx DM foot infection / surgeries - has R TMA, L hallux resection     L foot wound infection / osteomyelitis   - MRI with 5th MT + prox 5th phalanx destruction, lateral cuboid edema   - OR 10/7 - 5th ray resection; path - 'focal subacute, partially treated osteomyelitis' (L 5th MT)     R foot wound infection / osteomyelitis - limb threatening, possible limb loss / BKA discussed with pt in past, refuses consideration of R BKA   - MRI with cuboid, 4th MT OM   - OR 10/7 - R 4th ray, partial cuboid resection - NOT definitive   - Path with 4th MT acute osteomyelitis, R cuboid 'focal acute osteomyelitis', R cuboid clearance frag 'rare focus of acute osteomyelitis'     - OR 10/13 - L I&D and closure; R I&D, further cuboid resection, VAC   - Cult polymicrobial with Ps aerug (R cipro), E coli, E faecalis     Narcotic dependence  Encephalopathy      Fever   - risk aspiration, line infection, progression pedal / foot infection (had recommended R amputation), fungemia   - RESOLVED    GI - EGD with no significant findings, 'moderate striped erythema in the gastric antrum and body'    Plan:     Cont vancomycin, meropenem     Wound care per Podiatry     See EBONY  After iv antibiotics, will need chronic po suppression (choice will be imperfect given FQ R Pseudomonas, Enterococcus,CKD - will treat with cefuroxime 500 mg po daily)    Medical Decision Making:   The following items were considered in medical decision making:  Discussion of patient care with other providers  Reviewed clinical lab tests  Reviewed radiology tests  Reviewed other diagnostic tests/interventions  Microbiology cultures and other micro tests reviewed      Discussed with pt, RN  Henry Rodriguez MD     INFUSION ORDERS:   Vancomycin 500 mg iv daily through 12/8  Meropenem 1 gm iv q 12 through 12/8  - Diagnosis - DM foot osteomyelitis   - First dose given in hospital  - PICC   - Disposition / date discharge  - Check CBC w diff, CMP, ESR, CRP, vanco trough every Mon or Tue - FAX result to [phone]  - Call with antibiotic / infusion issues, [phone]  - Call with any change in status, transfer in or out of a facility or to hospital - [phone]  - No f/u in outpatient ID office necessary

## 2021-11-16 NOTE — PROGRESS NOTES
Called report x2 to [address] [facility]. Received no answer and voicemail is full.

## 2021-11-16 NOTE — PROGRESS NOTES
Clinical Pharmacy Progress Note    Vancomycin - Management by Pharmacy    Consult Date(s): 11/3/21  Consulting Provider(s): Dr Didi Mohan / Plan  1)  B/L foot infection / osteomyelitis - Vancomycin   Concurrent Antimicrobials: Meropenem   Day of Vanc + Meropenem therapy: To continue through 12/8 per ID   Current Dosing Method:  Trough based dosing  o Therapeutic Goal: Trough ~15 mcg/mL  o Trough 11/15 = 16.8 mcg/mL, drawn ~2 hrs early. Actual 24-hr trough likely ~15mcg/mL (appropriate). o Continue 500 mg IV q24h. o Will plan to check troughs weekly on Mondays unless clinical condition changes necessitating more frequent levels.  Will continue to monitor clinical condition and make adjustments to regimen as appropriate. Please call with questions--  Thanks--  Courtney Siddiqui, PharmD, BCPS, [facility]  [id number] ([facility])   11/16/2021 9:38 AM  __________________________________________________________________________    Interval update:   Planning discharge to [facility] this afternoon. Subjective/Objective: Ms. Adrianne Reyna is a 60 y.o. female with a PMHx significant for osteomyelitis on vanc/meropenem in [place], CKD4, DM, narcotic dependent chronic pain, admitted for AMS which responded to Narcan. In the ED, patient became combative and went into respiratory distress requiring intubation. Per the ED, patient also vomited several times and there is concern for new aspiration PNA. Pharmacy has been consulted to continue patient's vancomycin dosing.     Ht Readings from Last 1 Encounters:   11/08/21 5' (1.524 m)      Wt Readings from Last 1 Encounters:   11/15/21 182 lb 5.1 oz (82.7 kg)       Vancomycin Level(s) / Doses:    Date Time Dose Level / Type of Level Interpretation   11/6 05:14 750mg q24h Random = 23.0mcg/mL Drawn ~19 hr after prior dose given, predicts  and trough 23.8   11/7 03:07  Random = 13.2mcg/mL Intermittent dosing   11/8 05:29  Random = 13.9 mcg/mL Start 500 mg IV q24h   11/10 08:35 500 mg q24h Trough = 16.5 mcg/mL Drawn appropriately. 11/15 06:28 500mg q24h Trough = 16.8 mcg/mL Drawn ~2 hr early. Note: Serum levels collected for AUC-based dosing may be high if collected in close proximity to the dose administered. This is not necessarily an indicator of toxicity. Cultures & Sensitivities:    Date Site Micro Susceptibility / Result   11/3 Blood x2 No growth to date    11/4 Urine No growth      Labs / Ancillary Data:    Estimated Creatinine Clearance: 37 mL/min (A) (based on SCr of 1.6 mg/dL (H)).     Recent Labs     11/14/21  0620 11/15/21  0628 11/16/21  0555   CREATININE 1.6* 1.7* 1.6*   BUN 50* 45* 46*   WBC 6.3 4.9 4.4

## 2021-11-16 NOTE — PROGRESS NOTES
Physical Therapy  Daily Treatment Note    Discharge Recommendations: Heidy Martínez scored a 9/24 on the AM-PAC short mobility form. Current research shows that an AM-PAC score of 17 or less is typically not associated with a discharge to the patient's home setting. Based on the patient's AM-PAC score and their current functional mobility deficits, it is recommended that the patient have 3-5 sessions per week of Physical Therapy at d/c to increase the patient's independence. Please see assessment section for further patient specific details. Assessment:  Pt slow moving. Needing encouragement at times. Mobility limited by NWB B LEs. Pt would benefit from continued IP PT at D/C prior to going home. Plan is for SNF. Equipment Needs: Defer to next level of care    Chart Reviewed: Yes     Other position/activity restrictions: Activity as tolerate. NWB bilateral LE   Additional Pertinent Hx: Pt to ED 11/3 after being found unresponsive at [facility]. Pt given Narcan at facility and became agitated wt staff. Pt intubated in ED, but extubated upon arrival to ICU and placed on NRB mask. Head CT (-). Podiatry following for B foot wounds- NWB B feet. EGD on 11/10. Transferred from [facility] to [facility] 11/10. PMH:  asthma, COPD, DM, neuopathy, CHF, HTN, DVT, ETOH abuse, polysubstance abuse      Diagnosis: Acute Encephalopathy   Treatment Diagnosis: Decreased mobility associcated with acute encephalopathy. Subjective: Pt in bed initially. Agreeable to working with PT/OT. Pain: c/o discomfort \"my legs, feet, everywhere. \" RN aware and has been addressing. Objective:    Bed mobility  Supine to sit: CGA, HOB up with use of rail  Sit to Supine: CGA, HOB up  Rolling: Attempted to roll to L x 3 times (to finish pulling up brief), but pt would initiate and then decline to complete. \"Just leave me here. I'll pull them up later. \" (RN aware.)    Balance  Sat EOB x 25 minutes with SBA.  Mostly static, but also performing some UE ADLs with OT. Patient Education  Role of PT. Calling for assist with needs. Expressed understanding. Safety Devices  Pt left with needs in reach. In bed with bed alarm on. RN updated. AM-PAC score  AM-PAC Inpatient Mobility Raw Score : 9  AM-PAC Inpatient T-Scale Score : 30.55  Mobility Inpatient CMS 0-100% Score: 81.38  Mobility Inpatient CMS G-Code Modifier : CM    Goals: (as determined and assessed by primary PT)  Time Frame for Short term goals: Discharge  Short term goal 1: supine <> sit SBA   Short term goal 2: Lateral transfers with no more than Max A      Plan:  Times per week: 2-5;    Current Treatment Recommendations: Transfer Training, Gait Training, Functional Mobility Training, Strengthening    Therapy Time    Individual  Concurrent  Group  Co-treatment    Time In  1137            Time Out  1218            Minutes  41              Timed Code Treatment Minutes: 41  Total Treatment Minutes: 41    Will continue per plan of care if not D/Ce to SNF later today. If patient is discharged prior to next treatment, this note will serve as the discharge summary.     Deuce Ritter #[id number]

## 2021-11-16 NOTE — PROGRESS NOTES
Occupational Therapy  Facility/Department: [address]  Daily Treatment Note  NAME: Yoli Avilez  : 1965  MRN: [id number]    Date of Service: 2021    Discharge Recommendations:  Yoli Avilez scored a 14/24 on the AM-PAC ADL Inpatient form. Current research shows that an AM-PAC score of 17 or less is typically not associated with a discharge to the patient's home setting. Based on the patient's AM-PAC score and their current ADL deficits, it is recommended that the patient have 3-5 sessions per week of Occupational Therapy at d/c to increase the patient's independence. Please see assessment section for further patient specific details. If patient discharges prior to next session this note will serve as a discharge summary. Please see below for the latest assessment towards goals. OT Equipment Recommendations  Other: defer to next setting    Assessment   Performance deficits / Impairments: Decreased functional mobility ; Decreased ADL status; Decreased endurance  Assessment: Pt is limited by NWB BLEs and is also self limiting. Pt CGA for supine to sit and sit to supine. Pt required Dependent assist for LB dressing, Min A for UB dressing and grooming to comb tangles out of hair. Recommend continued inpt therapy at d/c to maxmize functional independence and safty. Continue with POC. Treatment Diagnosis: impaired ADLs/transfers 2* NWB BLE  OT Education: OT Role; Plan of Care  Patient Education: pt verbalized understanding  REQUIRES OT FOLLOW UP: Yes  Activity Tolerance  Activity Tolerance: Patient Tolerated treatment well  Safety Devices  Safety Devices in place: Yes  Type of devices: Nurse notified; Left in bed; Call light within reach; Bed alarm in place         Patient Diagnosis(es): The primary encounter diagnosis was Acute hypoxemic respiratory failure ([facility] [address] A diagnosis of Chronic osteomyelitis of right foot with draining sinus (HCC) was also pertinent to this visit.       has a past medical history of Asthma, Bacterial vaginosis, Carpal tunnel syndrome, COPD (chronic obstructive pulmonary disease) ([facility] [address] Diabetes mellitus type II, Diabetic neuropathy ([facility] [address] Diastolic CHF ([facility] [address] DVT (deep venous thrombosis) ([facility] [address] Dyslipidemia, Dyspareunia, ETOH abuse, HTN (hypertension), Hx of blood clots, Hyperlipidemia, MRSA (methicillin resistant staph aureus) culture positive, Neuropathy, Pancreatitis, Tobacco abuse, and Uses wheelchair. has a past surgical history that includes  section (unknown); pre-malignant / benign skin lesion excision (7003); other surgical history (Left, 2016); Toe amputation (Left, 2017); other surgical history (Right, 10/20/2017); other surgical history (Right, 2018); pr debridement, skin, sub-q tissue,muscle,bone,=<20 sq cm (Right, 2018); Foot Debridement (Right, 2019); Foot Debridement (Right, 2019); Foot Debridement (Right, 2019); Foot Debridement (Left, 10/23/2019); Tonsillectomy; knee surgery (Left); Foot Debridement (Right, 3/29/2021); IR TUNNELED CVC PLACE WO SQ PORT/PUMP > 5 YEARS (10/5/2021); Foot Debridement (10/7/2021); Foot Debridement (Bilateral, 10/13/2021); and Upper gastrointestinal endoscopy (N/A, 11/10/2021). Restrictions  Position Activity Restriction  Other position/activity restrictions: Activity as tolerate. NWB bilateral LE  Subjective   General  Chart Reviewed: Yes  Additional Pertinent Hx: Pt admitted 11/3/21 after being found unresponsive at [facility]. Pt was given Narcan x2 then became combative, then respiratory arres, lost pulse, CPR was performed, pt revived and again combative.      Head CT (-) acute      PMH includes: asthma, CTS, COPD, DM, ETOH abuse, HTN, blood clots, MRSA, neuropathy, pancreatitis, multiple foot surgeries, Lft toe amp  Family / Caregiver Present: No  Referring Practitioner: Dr. Poon Shows  Diagnosis: Acute encephalopathy  Subjective  Subjective: Pt supine in bed upon entry, agreeable to therapy session. Pt eager to d/c. General Comment  Comments: Pt supine to sit CGA. Pt sit EOB ~25 min with encouragement and education participated in ADLs with extended time: wash UB (setup/SBA), oral care (setup/SBA), assist to comb tangles out of back of head, Dependent to thread BLEs (once supine in bed pt declining rolling and pulling up LB clothing all of the way but anicipate Dependent, Pt Min A for UB dressing (to pull down bra and shirt in back). Pt sit to supine CGA. Call light in reach and bed alarm on. Pain Assessment  Pain Level: 8  Pain Type: Chronic pain  Pain Location: Foot  Pain Orientation: Right; Left  Pain Descriptors: Aching  Pain Frequency: Continuous  Pain Onset: On-going  Clinical Progression: Not changed  Functional Pain Assessment: Prevents or interferes some active activities and ADLs  Pre Treatment Pain Screening  Intervention List: Patient able to continue with treatment; Nurse/Physician notified  Vital Signs  Patient Currently in Pain: Yes   Orientation  Orientation  Overall Orientation Status: Within Functional Limits  Objective    ADL  UE Bathing: Stand by assistance; Setup; Increased time to complete  UE Dressing: Setup; Minimal assistance;  Increased time to complete  LE Dressing: Dependent/Total        Balance  Sitting Balance: Stand by assistance (~25 min)  Standing Balance: Unable to assess(comment)  Bed mobility  Supine to Sit: Contact guard assistance  Sit to Supine: Contact guard assistance  Comment: use of bed rail                          Cognition  Cognition Comment: very self limiting, requires encouragement for participation in activity with extended time to perform                                         Plan   Plan  Times per week: 2-5  Times per day: Daily  Current Treatment Recommendations: Endurance Training, Strengthening, Safety Education & Training, Equipment Evaluation, Education, & procurement, Self-Care / ADL  G-Code     OutComes Score AM-PAC Score        AM-PAC Inpatient Daily Activity Raw Score: 14 (11/16/21 1332)  AM-PAC Inpatient ADL T-Scale Score : 33.39 (11/16/21 1332)  ADL Inpatient CMS 0-100% Score: 59.67 (11/16/21 1332)  ADL Inpatient CMS G-Code Modifier : CK (11/16/21 1332)    Goals  Short term goals  Time Frame for Short term goals: Discharge  Short term goal 1: supine to sit w/ Min A - goal met 11/16  Short term goal 2: independence in Theraband UE exercise program - goal not addressed 11/16  Short term goal 3: tolerate 1 set of 10 reps BUE all planes for increased functional activity tolerance/strength - goal not addressed 11/16  Patient Goals   Patient goals : no goal stated       Therapy Time   Individual Concurrent Group Co-treatment   Time In 1137         Time Out 1216         Minutes 39         Timed Code Treatment Minutes: Darlene [address], [facility]

## 2021-11-16 NOTE — PROGRESS NOTES
Nephrology  Note                                                                                                                                                                                                                                                                                                                                                               Office : [phone]     Fax :[phone]              Patient's Name: Catie Blanco  2021    Reason for Consult:  Edema   Requesting Physician:  Marcus Beckett MD      Good diuresis   Cr stable   No SOB          Past Medical History:   Diagnosis Date    Asthma 2004    Bacterial vaginosis 2008    Carpal tunnel syndrome 2007    [facility]     Diabetes mellitus type II 2007    10/1/20 pt states does accucheck 2x/day at home    Diabetic neuropathy ([facility]     Diastolic CHF ([facility]     DVT (deep venous thrombosis) ([facility] 2004    Dyslipidemia 2009    Dyspareunia 2009    ETOH abuse 2007    HTN (hypertension)     Hx of blood clots     Hyperlipidemia     MRSA (methicillin resistant staph aureus) culture positive 2017; 2017    foot; leg     Neuropathy 2009    polyneuropathy    Pancreatitis 2004    Tobacco abuse 2008    Uses wheelchair     also uses walker       Past Surgical History:   Procedure Laterality Date     SECTION  unknown    FOOT DEBRIDEMENT Right 2019    INCISION AND DRAINAGE WITH APPLICATION OF STRAVIX GRAFT RIGHT FOOT performed by Tana Hampton DPM at [address] Right 2019    RIGHT FOOT INCISION AND DRAINAGE WITH STAGING TRANSMETATARSAL AMPUTATION performed by Tana Hampton DPM at [address] Right 2019    RIGHT FOOT DEBRIDEMENT INCISION AND DRAINAGE, OPEN DIABETIC FOOT ULCER WITH GRAFT PLACEMENT performed by Tana Hampton DPM at [address] Left 10/23/2019    LEFT FOOT INCISION AND DRAINAGE , DEBRIDEMENT OF OPEN WOUND, APPLICATION OF STRAVIX GRAFT performed by Tawanda Pablo DPM at [address] Right 3/29/2021    INCISION AND DRAINAGE, DEBRIDEMENT OF DIABETIC WOUND WITH PLACEMENT OF STRAVIX GRAFT RIGHT FOOT performed by Tawanda Pablo DPM at [address]  10/7/2021    BILATERAL LOWER EXTREMITY INCISION AND DRAINAGE, RIGHT FOOT 4TH RAY RESECTION, LEFT FOOT 5TH RAY RESECTION performed by Tawanda Pablo DPM at [address] Bilateral 10/13/2021    REPEAT INCISION AND DRAINAGE OF ALL NONVIABLE SOFT TISSUE AND BONE/ PARTIAL CUBOID RESECTION/ BONY BIOPSY AS NEEDED/ WOUND VAC RIGHT LOWER EXTREMITY performed by Twaanda Pablo DPM at [address] IR TUNNELED [address] 5 YEARS  10/5/2021    IR TUNNELED CATHETER PLACEMENT GREATER THAN 5 YEARS 10/5/2021 [facility] SPECIAL PROCEDURES    KNEE SURGERY Left     from falling off ladder -- has screws in place pt report    OTHER SURGICAL HISTORY Left 05/25/2016    I & D left foot    OTHER SURGICAL HISTORY Right 10/20/2017    RIGHT GASTROC LENGTHENING ENDOSCOPIC, INJECTION OF AMNI GRAFT    OTHER SURGICAL HISTORY Right 04/26/2018    Diabetic foot ulcer I&D w/ integra graft application    [place] DEBRIDEMENT, SKIN, SUB-Q TISSUE,MUSCLE,BONE,=<20 SQ CM Right 8/17/2018    RIGHT FOOT DEBRIDEMENT INCISION AND DRAINAGE, PARTIAL 5TH RAY AMPUTATION performed by Tawanda Pablo DPM at [address] PRE-MALIGNANT / [address]  7/7003    cryotherapy done on lesion    TOE AMPUTATION Left 02/24/2017    AMPUTATION LEFT GREAT TOE                 TONSILLECTOMY      UPPER GASTROINTESTINAL ENDOSCOPY N/A 11/10/2021    EGD BIOPSY performed by Nikolay Vides MD at [facility] ENDOSCOPY         Current Medications:    insulin glargine (LANTUS;BASAGLAR) injection pen 30 Units, Nightly  torsemide (DEMADEX) tablet 40 mg, Daily  0.9 % sodium chloride infusion, Once  amitriptyline (ELAVIL) tablet 50 mg, Nightly  pantoprazole (PROTONIX) tablet 40 mg, BID AC  vancomycin (VANCOCIN) 500 mg in dextrose 5 % 250 mL IVPB, Q24H  apixaban (ELIQUIS) tablet 5 mg, BID  meropenem (MERREM) 1,000 mg in sodium chloride 0.9 % 100 mL IVPB (mini-bag), Q12H  insulin lispro (1 Unit Dial) 0-18 Units, TID WC  insulin lispro (1 Unit Dial) 0-9 Units, Nightly  oxyCODONE (ROXICODONE) immediate release tablet 5 mg, Q4H PRN  glucose (GLUTOSE) 40 % oral gel 15 g, PRN  dextrose 50 % IV solution, PRN  glucagon (rDNA) injection 1 mg, PRN  dextrose 5 % solution, PRN  sodium chloride flush 0.9 % injection 5-40 mL, 2 times per day  sodium chloride flush 0.9 % injection 5-40 mL, PRN  0.9 % sodium chloride infusion, PRN  ondansetron (ZOFRAN-ODT) disintegrating tablet 4 mg, Q8H PRN   Or  ondansetron (ZOFRAN) injection 4 mg, Q6H PRN  polyethylene glycol (GLYCOLAX) packet 17 g, Daily PRN  acetaminophen (TYLENOL) tablet 650 mg, Q6H PRN   Or  acetaminophen (TYLENOL) suppository 650 mg, Q6H PRN  labetalol (NORMODYNE;TRANDATE) injection 10 mg, Q4H PRN  hydrOXYzine (VISTARIL) capsule 50 mg, Q8H PRN  promethazine (PHENERGAN) tablet 25 mg, Q6H PRN  metoprolol succinate (TOPROL XL) extended release tablet 50 mg, Daily          Physical exam:     Vitals:  /71   Pulse 77   Temp 97.7 °F (36.5 °C) (Oral)   Resp 16   Ht 5' (1.524 m)   Wt 182 lb 5.1 oz (82.7 kg)   LMP 10/23/2015   SpO2 93%   BMI 35.61 kg/m²   Constitutional:  OAA X3 NAD  Skin: no rash, turgor wnl  Heent:  eomi, mmm  Neck: no bruits or jvd noted  Cardiovascular:  S1, S2 without m/r/g  Respiratory: CTA B without w/r/r  Abdomen:  +bs, soft, nt, nd  Ext: + lower extremity edema      Data:   Labs:  CBC:   Recent Labs     11/14/21  0620 11/15/21  0628 11/16/21  0555   WBC 6.3 4.9 4.4   HGB 9.8* 9.2* 9.9*    365 373     BMP:    Recent Labs     11/14/21  0620 11/15/21  0628 11/16/21  0555    139 140   K 4.6 4.3 4.8    102 102   CO2 28 27 28   BUN 50* 45* 46*   CREATININE 1.6* 1.7* 1.6*   GLUCOSE 116* 92 215*     Ca/Mg/Phos:   Recent Labs     11/14/21  0620 11/15/21  0628 11/16/21  0555   CALCIUM 8.8 8.7 9.0   PHOS 4.0 3.9 4.0     Hepatic: No results for input(s): AST, ALT, ALB, BILITOT, ALKPHOS in the last 72 hours. Troponin: No results for input(s): TROPONINI in the last 72 hours. BNP: No results for input(s): BNP in the last 72 hours. Lipids: No results for input(s): CHOL, TRIG, HDL, LDLCALC, LABVLDL in the last 72 hours. ABGs: No results for input(s): PHART, PO2ART, CSM1MAC in the last 72 hours. INR: No results for input(s): INR in the last 72 hours. UA:No results for input(s): Tian Jews, GLUCOSEU, BILIRUBINUR, Cleda Lawman, BLOODU, PHUR, PROTEINU, UROBILINOGEN, NITRU, LEUKOCYTESUR, LABMICR, URINETYPE in the last 72 hours. Urine Microscopic: No results for input(s): LABCAST, BACTERIA, COMU, HYALCAST, WBCUA, RBCUA, EPIU in the last 72 hours. Urine Culture: No results for input(s): LABURIN in the last 72 hours. Urine Chemistry: No results for input(s): Junious Puls, PROTEINUR, NAUR in the last 72 hours. IMAGING:  XR CHEST PORTABLE   Final Result   1. Stable chest      XR CHEST PORTABLE   Final Result   Impression: Interval extubation. Stable appearance of the lungs. CT Head WO Contrast   Final Result      No acute intracranial pathology                 XR CHEST PORTABLE   Final Result   1. Endotracheal tube tip just directed at the right mainstem bronchus. Catheter can be withdrawn 1.5 to 2 cm. 2. Multifocal airspace disease      Critical finding reported to  Physician: Nika García  at 3:18 PM on 11/3/2021, and he confirmed that the tube has been repositioned after review of the above radiograph           Assessment/Plan   1. CKD 3     2. HTN    3. Anemia    4. Acid- base/ Electrolyte imbalance     5.  Edema     Plan   - cont torsemide PO   - BP well controlled   - Aspiration was big issue as pt was lethargic before she was intubated  - creat at baseline now   - [address] in am     Recommend to dose adjust all medications  based on renal functions  Maintain SBP> 90 mmHg   Daily weights   AVOID NSAIDs  Avoid Nephrotoxins  Monitor Intake/Output  Call if significant decrease in urine output                 Thank you for allowing us to participate in care of Elio Gallagher MD  Feel free to contact me   [facility] [address]  Office : [phone]  Fax :[phone]

## 2021-11-16 NOTE — PROGRESS NOTES
Nephrology  Note                                                                                                                                                                                                                                                                                                                                                               Office : [phone]     Fax :[phone]              Patient's Name: Yoli Avilez  11/15/2021    Reason for Consult:  Edema   Requesting Physician:  Andrew Chaparro MD      Good diuresis   Cr stable   No SOB          Past Medical History:   Diagnosis Date    Asthma 2004    Bacterial vaginosis 2008    Carpal tunnel syndrome 2007    [facility]     Diabetes mellitus type II 2007    10/1/20 pt states does accucheck 2x/day at home    Diabetic neuropathy ([facility]     Diastolic CHF ([facility]     DVT (deep venous thrombosis) ([facility] 2004    Dyslipidemia 2009    Dyspareunia 2009    ETOH abuse 2007    HTN (hypertension)     Hx of blood clots     Hyperlipidemia     MRSA (methicillin resistant staph aureus) culture positive 2017; 2017    foot; leg     Neuropathy 2009    polyneuropathy    Pancreatitis 2004    Tobacco abuse 2008    Uses wheelchair     also uses walker       Past Surgical History:   Procedure Laterality Date     SECTION  unknown    FOOT DEBRIDEMENT Right 2019    INCISION AND DRAINAGE WITH APPLICATION OF STRAVIX GRAFT RIGHT FOOT performed by Beau Tony DPM at [address] [place] [address] Right 2019    RIGHT FOOT INCISION AND DRAINAGE WITH STAGING TRANSMETATARSAL AMPUTATION performed by Beau Tony DPM at [address] [place] [address] Right 2019    RIGHT FOOT DEBRIDEMENT INCISION AND DRAINAGE, OPEN DIABETIC FOOT ULCER WITH GRAFT PLACEMENT performed by Beau Tony DPM at [address] [place] [address] Left 10/23/2019    LEFT FOOT INCISION AND DRAINAGE , DEBRIDEMENT OF OPEN WOUND, APPLICATION OF STRAVIX GRAFT performed by Alyssa Andujar DPM at [address] Right 3/29/2021    INCISION AND DRAINAGE, DEBRIDEMENT OF DIABETIC WOUND WITH PLACEMENT OF STRAVIX GRAFT RIGHT FOOT performed by Alyssa Andujar DPM at [address]  10/7/2021    BILATERAL LOWER EXTREMITY INCISION AND DRAINAGE, RIGHT FOOT 4TH RAY RESECTION, LEFT FOOT 5TH RAY RESECTION performed by Alyssa Andujar DPM at [address] Bilateral 10/13/2021    REPEAT INCISION AND DRAINAGE OF ALL NONVIABLE SOFT TISSUE AND BONE/ PARTIAL CUBOID RESECTION/ BONY BIOPSY AS NEEDED/ WOUND VAC RIGHT LOWER EXTREMITY performed by Alyssa Andujar DPM at [address] IR TUNNELED [address] 5 YEARS  10/5/2021    IR TUNNELED CATHETER PLACEMENT GREATER THAN 5 YEARS 10/5/2021 [facility] SPECIAL PROCEDURES    KNEE SURGERY Left     from falling off ladder -- has screws in place pt report    OTHER SURGICAL HISTORY Left 05/25/2016    I & D left foot    OTHER SURGICAL HISTORY Right 10/20/2017    RIGHT GASTROC LENGTHENING ENDOSCOPIC, INJECTION OF AMNI GRAFT    OTHER SURGICAL HISTORY Right 04/26/2018    Diabetic foot ulcer I&D w/ integra graft application    [place] DEBRIDEMENT, SKIN, SUB-Q TISSUE,MUSCLE,BONE,=<20 SQ CM Right 8/17/2018    RIGHT FOOT DEBRIDEMENT INCISION AND DRAINAGE, PARTIAL 5TH RAY AMPUTATION performed by Alyssa Andujar DPM at [address]-[place]AL[address] / [address]  7/7003    cryotherapy done on lesion    TOE AMPUTATION Left 02/24/2017    AMPUTATION LEFT GREAT TOE                 TONSILLECTOMY      UPPER GASTROINTESTINAL ENDOSCOPY N/A 11/10/2021    EGD BIOPSY performed by Michael Mclean MD at [facility] ENDOSCOPY         Current Medications:    insulin glargine (LANTUS;BASAGLAR) injection pen 30 Units, Nightly  torsemide (DEMADEX) tablet 40 mg, Daily  0.9 % sodium chloride infusion, Once  amitriptyline (ELAVIL) tablet 50 mg, Nightly  pantoprazole (PROTONIX) tablet 40 mg, BID AC  vancomycin (VANCOCIN) 500 mg in dextrose 5 % 250 mL IVPB, Q24H  apixaban (ELIQUIS) tablet 5 mg, BID  meropenem (MERREM) 1,000 mg in sodium chloride 0.9 % 100 mL IVPB (mini-bag), Q12H  insulin lispro (1 Unit Dial) 0-18 Units, TID WC  insulin lispro (1 Unit Dial) 0-9 Units, Nightly  oxyCODONE (ROXICODONE) immediate release tablet 5 mg, Q4H PRN  glucose (GLUTOSE) 40 % oral gel 15 g, PRN  dextrose 50 % IV solution, PRN  glucagon (rDNA) injection 1 mg, PRN  dextrose 5 % solution, PRN  sodium chloride flush 0.9 % injection 5-40 mL, 2 times per day  sodium chloride flush 0.9 % injection 5-40 mL, PRN  0.9 % sodium chloride infusion, PRN  ondansetron (ZOFRAN-ODT) disintegrating tablet 4 mg, Q8H PRN   Or  ondansetron (ZOFRAN) injection 4 mg, Q6H PRN  polyethylene glycol (GLYCOLAX) packet 17 g, Daily PRN  acetaminophen (TYLENOL) tablet 650 mg, Q6H PRN   Or  acetaminophen (TYLENOL) suppository 650 mg, Q6H PRN  labetalol (NORMODYNE;TRANDATE) injection 10 mg, Q4H PRN  hydrOXYzine (VISTARIL) capsule 50 mg, Q8H PRN  promethazine (PHENERGAN) tablet 25 mg, Q6H PRN  metoprolol succinate (TOPROL XL) extended release tablet 50 mg, Daily          Physical exam:     Vitals:  /70   Pulse 80   Temp 98.5 °F (36.9 °C) (Oral)   Resp 18   Ht 5' (1.524 m)   Wt 182 lb 5.1 oz (82.7 kg)   LMP 10/23/2015   SpO2 90%   BMI 35.61 kg/m²   Constitutional:  OAA X3 NAD  Skin: no rash, turgor wnl  Heent:  eomi, mmm  Neck: no bruits or jvd noted  Cardiovascular:  S1, S2 without m/r/g  Respiratory: CTA B without w/r/r  Abdomen:  +bs, soft, nt, nd  Ext: + lower extremity edema      Data:   Labs:  CBC:   Recent Labs     11/13/21  0639 11/14/21  0620 11/15/21  0628   WBC 4.8 6.3 4.9   HGB 9.2* 9.8* 9.2*    382 365     BMP:    Recent Labs     11/13/21  0639 11/14/21  0620 11/15/21  0628    141 139   K 4.5 4.6 4.3   CL 97* 102 102   CO2 27 28 27   BUN 54* 50* 45*   CREATININE 1.7* 1.6* 1.7*   GLUCOSE 161* 116* 92     Ca/Mg/Phos:   Recent Labs     11/13/21  0639 11/14/21  0620 11/15/21  0628   CALCIUM 8.8 8.8 8.7   PHOS 4.6 4.0 3.9     Hepatic: No results for input(s): AST, ALT, ALB, BILITOT, ALKPHOS in the last 72 hours. Troponin: No results for input(s): TROPONINI in the last 72 hours. BNP: No results for input(s): BNP in the last 72 hours. Lipids: No results for input(s): CHOL, TRIG, HDL, LDLCALC, LABVLDL in the last 72 hours. ABGs: No results for input(s): PHART, PO2ART, MBU7PPF in the last 72 hours. INR: No results for input(s): INR in the last 72 hours. UA:No results for input(s): Lorelie [place], GLUCOSEU, BILIRUBINUR, Theoplis Crow, BLOODU, PHUR, PROTEINU, UROBILINOGEN, NITRU, LEUKOCYTESUR, LABMICR, URINETYPE in the last 72 hours. Urine Microscopic: No results for input(s): LABCAST, BACTERIA, COMU, HYALCAST, WBCUA, RBCUA, EPIU in the last 72 hours. Urine Culture: No results for input(s): LABURIN in the last 72 hours. Urine Chemistry: No results for input(s): Milagro Sandman, PROTEINUR, NAUR in the last 72 hours. IMAGING:  XR CHEST PORTABLE   Final Result   1. Stable chest      XR CHEST PORTABLE   Final Result   Impression: Interval extubation. Stable appearance of the lungs. CT Head WO Contrast   Final Result      No acute intracranial pathology                 XR CHEST PORTABLE   Final Result   1. Endotracheal tube tip just directed at the right mainstem bronchus. Catheter can be withdrawn 1.5 to 2 cm. 2. Multifocal airspace disease      Critical finding reported to  Physician: Trang Thompson  at 3:18 PM on 11/3/2021, and he confirmed that the tube has been repositioned after review of the above radiograph           Assessment/Plan   1. CKD 3     2. HTN    3. Anemia    4. Acid- base/ Electrolyte imbalance     5.  Edema     Plan   - cont torsemide PO   - BP well controlled   - Aspiration was big issue as pt was lethargic before she was intubated  - creat at baseline now   - [id number] in am     Recommend to dose adjust all medications  based on renal functions  Maintain SBP> 90 mmHg   Daily weights   AVOID NSAIDs  Avoid Nephrotoxins  Monitor Intake/Output  Call if significant decrease in urine output                 Thank you for allowing us to participate in care of Cherylene Mane, MD  Feel free to contact me   [facility] [address]  Office : [phone]  Fax :[phone]

## 2021-11-16 NOTE — PROGRESS NOTES
On-call physician contacted regarding patient's request for something for diarrhea and gas, awaiting response.

## 2021-11-16 NOTE — DISCHARGE SUMMARY
Hospital Medicine Discharge Summary    Patient ID: María Rutledge      Patient's PCP: Shira Schuster MD    Admit Date: 11/3/2021     Discharge Date:  11/16/21    Admitting Physician: Harry Sams MD     Discharge Physician: Harry Sams MD     Discharge Diagnoses: Active Hospital Problems    Diagnosis Date Noted    Acute encephalopathy [G93.40] 10/18/2021    Type 2 diabetes mellitus with left diabetic foot infection ([facility] [address] [T76.717, L08.9]     Type 2 diabetes mellitus with right diabetic foot infection ([facility] [address] [F45.131, L08.9] 03/12/2021    Chronic osteomyelitis of right foot with draining sinus [facility]) [M86.471] 06/03/2019    Diabetic polyneuropathy associated with type 2 diabetes mellitus ([facility] [address] [E11.42] 03/26/2019    Open wound of right foot [S91.301A] 07/03/2018       The patient was seen and examined on day of discharge and this discharge summary is in conjunction with any daily progress note from day of discharge. Hospital Course:   Patient was admitted and treated for following:    #Acute respiratory failure with hypoxemia  #Pulmonary edema  Patient was intubated in the emergency room, extubated once was in ICU to NRB. Required Vapotherm and transition to nasal cannula. Sats were monitored and patient was able to be weaned down to baseline oxygen of 3 to 4 L. Patient received IV diuretics and IV antibiotics. Patient appeared euvolemic and was transitioned to p.o. torsemide.     #Abdominal pain  GI was consulted. Protonix was continued. EGD 11/10 showed moderate striped erythema in the gastric antrum and body. Patient to continue Protonix. Abdominal pain resolved.     #Acute encephalopathy toxic due to narcotic overdose, improved with Narcan use. She was asked to stop methadone on discharge on 11/01. Pharmacy verified med list with nursing home she was not getting methadone at facility. Narcotics were avoided.   Minimal dose of opiates was given     #NSTEMI type II due to Observation/Bedded OP   PT Plan of Care Note      Pt seen on [facility] nursing unit.    Visit Number: 1  Visits Approved: medically necessary  Primary Insurance: MEDICARE  Secondary Insurance: [employer]    Note sent for required physician co-signature: Yes         Is the patient currently receiving skilled home care services (RN or therapy) No    DIAGNOSIS:  1. Closed nondisplaced fracture of lateral malleolus of left fibula, initial encounter    2. Frequent falls    3. Generalized weakness    4. Essential hypertension    5. Closed fracture of distal end of left fibula, unspecified fracture morphology, initial encounter    6. Recurrent falls    7. Essential (primary) hypertension    8. Benign non-nodular prostatic hyperplasia with lower urinary tract symptoms    9. UNIQUE (obstructive sleep apnea)    10. Spinal stenosis, lumbar region, without neurogenic claudication    11. Abnormal gait        ASSESSMENT:   Pt is a 89 year old male admitted to hospital secondary to a left non-displaced fibular fracture following a fall. Pt with a past medical history significant for CAD, essential hypertension, hyperlipidemia, MI and spinal stensois of lumbar region. Pt presents below baseline functional mobility which was supervision level with use of standard walker. [facility] 24 hr caregiver assistance though was still having frequent falls leading to hospitalizations. Pt presenting with impaired LE strength, balance and activity tolerance.     Pt Geriatrics pt okay to ambulate with CAM boot and WBAT for LLE. Pt completed mobility with PT/OT co-eval required moderate assist initially for transfers but did progress to min to steadying assist with repeated trials. Pt then ambulated 30ft + 50ft at min assist with better safety when using a 2WW compared to a standard walker which is what he had been using a home. Pt would benefit from a hanane walker to reduce risk for falls with pt  demand ischemia,   EKG trend was flat, no EKG changes critical ischemia or infarction     #Chronic DVTs  Was placed on heparin drip on admission and then Eliquis was resumed     #Diabetic foot ulceration/chronic osteomyelitis  High fever on admission. ID and podiatry was consulted. Patient was continued on broad-spectrum antibiotics. Right lower extremity wound VAC was applied per podiatry. Wound care was continued. Patient to follow-up with podiatry outpatient     #FAHEEM on CKD stage IV, on discharge creatinine was 1.6 (11/01)  Nephrology was consulted. Creatinine was monitored. Patient was diuresed and creatinine remained stable. Patient was transitioned to oral torsemide.     #Chronic diastolic congestive heart failure   Patient had improved proBNP actually lower than discharge on 11/1 on admission. Diuretics were continued.      #Type 2 diabetes  Patient blood glucose was monitored. Lantus/sliding scale insulin was continued. Physical Exam Performed:     /71   Pulse 77   Temp 97.7 °F (36.5 °C) (Oral)   Resp 16   Ht 5' (1.524 m)   Wt 182 lb 5.1 oz (82.7 kg)   LMP 10/23/2015   SpO2 93%   BMI 35.61 kg/m²     Constitutional:  Chronically ill appearing, NAD  HENT: Normocephalic and atraumatic. Extraocular movements intact. Conjunctivae normal.   Cardiovascular: Normal rate and regular rhythm with no murmur/gallops and rubs  Pulmonary: Normal respiratory effort. Bilateral clear to auscultate, no wheeze no rhonchi. Abdominal: No tenderness, soft, BS +ve  Musculoskeletal: BLEs wrapped in ace bandages. Lower extremity with wound VAC  Skin: Skin is warm and dry. Neurological: Alert and oriented x3. Following commands. Grossly nonfocal  Psychiatric: Mood normal.       Labs:  For convenience and continuity at follow-up the following most recent labs are provided:      CBC:    Lab Results   Component Value Date    WBC 4.4 11/16/2021    HGB 9.9 11/16/2021    HCT 29.9 11/16/2021     stating he has not had one ordered in the past 5 years.     Following PT/OT evaluation Ortho consult was placed which now states \"There is no surgical indication for this ankle fracture.  He should remain non-weightbearing or flat-foot/toe touch weight bearing at most with cam boot. He will likely need to remain in the boot for 6-8 weeks. An uncomplicated course of healing is expected.  He should follow up in 2 weeks for new xrays with Dr. Minor Delgado.\" Do not anticipate pt being able to follow toe-touch weight bearing well and pt will require a manual w/c in order to safely complete ADLs at home. Order placed for manual w/c that would be delivered to pt home as plan at this time is for d/c to home with follow up OP PT.  Pt would benefit from skilled physical therapy to maximize safe functional mobility prior to d/c.        ADDENDUM: Spoke with Dr. Carroll who contacted Ortho regarding pt weight bearing level which was changed back to Weight Bearing As Tolerated. Plan adjusted. Pt would still benefit from a manual w/c as he has been having frequently falls and it is recommending he minimize gait and sit for ADLs.      PT Identified Barriers to Discharge: none at this time     PLAN:   Continue skilled PT, including the following Treatment/Interventions: Functional transfer training;Strengthening;Endurance training;Cognitive reorientation;Patient/Family training;Equipment eval/education;Bed mobility;Gait training;Stairs retraining;Safety Education;Neuromuscular re-education (03/07/18 7479)     Treatment Plan for Next Session: pt not toe-touch weight bearing LLE per ortho - trial transfers and gait with TTWB          Amount: 31-60 minutes  Frequency: 6 days/week  Duration: 5 days    Co-morbidities:   Patient Active Problem List   Diagnosis   • Spinal stenosis, lumbar region, without neurogenic claudication   • Peptic ulcer, unspecified site, unspecified as acute or chronic, without mention of hemorrhage, perforation, or  11/16/2021       Renal:    Lab Results   Component Value Date     11/16/2021    K 4.8 11/16/2021    K 4.7 11/03/2021     11/16/2021    CO2 28 11/16/2021    BUN 46 11/16/2021    CREATININE 1.6 11/16/2021    CALCIUM 9.0 11/16/2021    PHOS 4.0 11/16/2021         Significant Diagnostic Studies    Radiology:   XR CHEST PORTABLE   Final Result   1. Stable chest      XR CHEST PORTABLE   Final Result   Impression: Interval extubation. Stable appearance of the lungs. CT Head WO Contrast   Final Result      No acute intracranial pathology                 XR CHEST PORTABLE   Final Result   1. Endotracheal tube tip just directed at the right mainstem bronchus. Catheter can be withdrawn 1.5 to 2 cm. 2. Multifocal airspace disease      Critical finding reported to  Physician: Alberto Guy  at 3:18 PM on 11/3/2021, and he confirmed that the tube has been repositioned after review of the above radiograph              Consults:     Wil Pineda HOSPITALIST  IP CONSULT TO CRITICAL CARE  IP CONSULT TO INFECTIOUS DISEASES  PHARMACY TO DOSE VANCOMYCIN  IP CONSULT TO PODIATRY  IP CONSULT TO PHARMACY  IP CONSULT TO PALLIATIVE CARE  IP CONSULT TO NEPHROLOGY  IP CONSULT TO PHARMACY  IP CONSULT TO GI    Disposition:  SNF    Condition at Discharge: Stable    Discharge Instructions/Follow-up: Monitor blood glucose. Adjust insulin as needed. Complete antibiotic course.   Follow-up with podiatry and PCP    Code Status:  Full Code     Activity: activity as tolerated    Diet: diabetic diet      Discharge Medications:     Current Discharge Medication List           Details   !! insulin lispro, 1 Unit Dial, 100 UNIT/ML SOPN Inject 0-18 Units into the skin 3 times daily (with meals)  Qty: 1 pen, Refills: 1      !! insulin lispro, 1 Unit Dial, 100 UNIT/ML SOPN Inject 0-9 Units into the skin nightly  Qty: 1 pen, Refills: 1      pantoprazole (PROTONIX) 40 MG tablet Take 1 tablet by mouth 2 obstruction   • Cutaneous squamous cell carcinoma    • Hyperlipidemia   • Disturbances of sensation of smell and taste   • Edema   • Pain in limb   • Sensorineural hearing loss, bilateral - hearing aides   • UNIQUE (obstructive sleep apnea)   • Sinus Node Dysfunction   • CAD (coronary artery disease)   • Bradycardia   • Abnormal gait   • Essential (primary) hypertension   • Severe obstructive sleep apnea   • Benign non-nodular prostatic hyperplasia with lower urinary tract symptoms   • Generalized weakness   • CHI (closed head injury)   • Fall from standing   • Multiple skin tears   • Other injury of muscle, fascia and tendon of long head of biceps, left arm, initial encounter   • Closed fracture of multiple ribs of right side   • Frequent falls   • Closed nondisplaced fracture of lateral malleolus of left fibula         Clinical Presentation: evolving clinical presentation with changing characteristics                       THERAPY DIAGNOSIS: Impaired gait    SUBJECTIVE: Subjective: Pt agreeable to PT session. \"That boot looks too heavy.\" (03/07/18 1055)    OBJECTIVE:  Precautions:  Precautions  Weight Bearing Status: Toe touch weight bearing left (03/07/18 1055)  Weight Bearing Status Comments: With CAM boot on (At time of eval pt had been WBAT LLE with CAM boot) (03/07/18 1055)  Other Precautions: safety, fall risk (03/07/18 1055)  RN reported Stahl Fall Scale Score: 85 (>45 is considered at risk of fall)  Vitals:  Vital Signs: asymptomatic (03/07/18 1055)  Activity Tolerance:  no limits in patient's ability to participate in session  Exercise:       Functional Mobility (as of date/time noted)  Bed Mobility:        Transfer:   Sit to Stand: Moderate Assist (Mod);Minimal Assist (Min);Touching/Steadying Assistance (03/07/18 1055)  Stand to Sit: Minimal Assist (Min) (03/07/18 1055)  Stand Pivot Transfers: Minimal Assist (Min) (03/07/18 1055)  Assistive Device/: 2-wheeled walker;Gait Belt;1 Person (left CAM  boot) (03/07/18 1055)  Transfer Comments 1: Moderate assist needed for anterior weight shifting on first two trials but progresses to min to steadying assist with cues for hand placements and to bring hips forward. Min assist for balance with pivot transfer and to control stand to sit. (03/07/18 1055)    Ambulation:  Gait Assistance: Total Assist - Dependent;Minimal Assist (Min) (03/07/18 1055)  Assistive Device/: 2-wheeled walker;Standard walker;1 Person;2 People;Gait Belt (CAM boot) (03/07/18 1055)  Ambulation Distance (Feet): 30 Feet (+ 50ft) (03/07/18 1055)  Gait Comments 1: Initially trialed standard walker as pt reports this is what he was using at home however pt with difficulty managing walker and stepping so switched to a hanane walker. Pt initially required min assist x 1 plus steadying to stand by of second due to right lean however does progress to requiring only min assit x 1 with better fluidity when shoe also on left foot and splint that had been on left foot was removed.   (03/07/18 1055)    Stair Negotiation:       See PT flowsheet for full details regarding the PT therapy provided.    EDUCATION:   On this date, the patient was educated on PT plan of care, transfer and gait training.    The response to education was: Needs reinforcement    GOALS:  Short Term Goals to Be Reviewed On: 03/14/18 (03/07/18 1055)  Short Term Goals = Discharge Goals: Yes (03/07/18 1055)  Goal Agreement: Patient agrees with goals and treatment plan (03/07/18 1055)  Bed Mobility Discharge Goal: modified independent (03/07/18 1055)  Transfer Discharge Goal: supervision (03/07/18 1055)  Ambulation Discharge Goal: ambulate 15ft with min and least restrictive device at toe touch weight bearing (03/07/18 1055)    RECOMMENDATIONS FOR DISCHARGE:  Recommendation for Discharge: PT: Home;OP therapy (03/07/18 1055)    PT/OT Mobility Equipment for Discharge: order placed for hanane 2WW and manual w/c through [employer]. Pt has  times daily (before meals)  Qty: 30 tablet, Refills: 1       !! - Potential duplicate medications found. Please discuss with provider.            Details   insulin glargine (LANTUS;BASAGLAR) 100 UNIT/ML injection pen Inject 30 Units into the skin nightly  Qty: 5 pen, Refills: 3      torsemide (DEMADEX) 20 MG tablet Take 2 tablets by mouth daily  Qty: 60 tablet, Refills: 0              Details   acetaminophen (TYLENOL) 325 MG tablet Take 650 mg by mouth every 4 hours as needed for Pain or Fever (Mild pain, Temp >101 F)      atorvastatin (LIPITOR) 40 MG tablet Take 40 mg by mouth nightly      guaiFENesin-dextromethorphan (ROBITUSSIN DM) 100-10 MG/5ML syrup Take 10 mLs by mouth every 4 hours as needed for Cough      magnesium hydroxide (MILK OF MAGNESIA) 400 MG/5ML suspension Take 30 mLs by mouth daily as needed for Constipation      bismuth subsalicylate (PEPTO BISMOL) 262 MG/15ML suspension Take 30 mLs by mouth every 3-4 hours as needed for Indigestion or Diarrhea May take q3h PRN for diarrhea or q4h PRN for indigestion      Multiple Vitamins-Minerals (THERAPEUTIC MULTIVITAMIN-MINERALS) tablet Take 1 tablet by mouth daily      glucagon 1 MG injection Inject 1 kit into the muscle as needed (low blood sugar)      aspirin 81 MG chewable tablet Take 81 mg by mouth daily       ammonium lactate (LAC-HYDRIN) 12 % lotion Apply topically nightly Apply to both legs topically at bedtime for itching      metoprolol succinate (TOPROL XL) 50 MG extended release tablet Take 1 tablet by mouth daily  Qty: 30 tablet, Refills: 3      ELIQUIS 5 MG TABS tablet TAKE 1 TABLET BY MOUTH TWICE DAILY  Qty: 180 tablet, Refills: 1    Associated Diagnoses: Deep vein thrombosis (DVT) of proximal lower extremity, unspecified chronicity, unspecified laterality (HCC)      escitalopram (LEXAPRO) 10 MG tablet Take 1 tablet by mouth daily  Qty: 30 tablet, Refills: 2    Associated Diagnoses: Depression, unspecified depression type      amitriptyline 4WW and standard walker at home that daughter states either aren't through insurance or were 10 years ago. ([id number])       BILLING INFORMATION:  Timed Procedures:   , ,$ Gait Trainin-37 mins, , , , , , ,$ Therapy Activities per 15 min: 8-22 mins, , ,     Untimed Procedures:   , , , , , , ,$ PT Eval: Moderate Complexity: 1 Procedure,     G-Codes:(Clinical judgement was used to determine severity of modifiers)  $ Mobility - Current Status: CL,$ Mobility - Goal: CJ, , , , , , , , , , , , , , , ,     Total Treatment Time:  PT Time Spent: 60 minutes    Timed Treatment Minutes:   OBS Patients Only:  PT Timed Code TX Minutes: 40 minutes       The co-signature indicates that the physician certifies the need for PT furnished under this plan of treatment while under his/her care.    Cathie Land, PT  Pager: [phone]  Phone: [phone]

## 2021-11-16 NOTE — PROGRESS NOTES
Podiatric Surgery Daily Progress Note  [facility]      Subjective :   Patient seen and examined this am at the bedside. Denies any acute overnight events. Patient denies N/V/F/C/SOB. Patient denies calf pain, thigh pain, chest pain. Review of Systems: A 12 point review of symptoms is unremarkable with the exception of the chief complaint. Patient specifically denies nausea, fever, vomiting, chills, shortness of breath, chest pain, abdominal pain, constipation or difficulty urinating. Objective     BP (!) 154/79   Pulse 77   Temp 97.5 °F (36.4 °C) (Oral)   Resp 16   Ht 5' (1.524 m)   Wt 182 lb 5.1 oz (82.7 kg)   LMP 10/23/2015   SpO2 93%   BMI 35.61 kg/m²     I/O:    Intake/Output Summary (Last 24 hours) at 11/16/2021 0636  Last data filed at 11/16/2021 0616  Gross per 24 hour   Intake 850 ml   Output 1200 ml   Net -350 ml              Wt Readings from Last 3 Encounters:   11/15/21 182 lb 5.1 oz (82.7 kg)   10/30/21 200 lb 13.4 oz (91.1 kg)   10/20/21 223 lb 1.7 oz (101.2 kg)       LABS:    Recent Labs     11/15/21  0628 11/16/21  0555   WBC 4.9 4.4   HGB 9.2* 9.9*   HCT 27.8* 29.9*    373        Recent Labs     11/16/21  0555      K 4.8      CO2 28   PHOS 4.0   BUN 46*   CREATININE 1.6*        No results for input(s): PROT, INR, APTT in the last 72 hours. LOWER EXTREMITY EXAMINATION    Dressing to bilateral LE intact. No strikethrough noted to the external dressing. Scant sanguinous drainage noted to the internal layers of the dressing. VASCULAR: DP and PT pulses nonpalpable. Upon hand-held Doppler examination, biphasic signals noted to DP and PT bilateral.  CFT is brisk to the digits of the foot b/l. Skin temperature is warm to cool from proximal to distal with no focal calor noted. No edema noted to b/l LE.  No pain with calf compression b/l.     NEUROLOGIC:  Gross and epicritic sensation diminished bilateral.  Protective sensation absent at all pedal sites bilateral.     DERMATOLOGIC: Diffuse dermatologic changes noted bilateral lower extremity.     Right lower extremity:  Full-thickness ulceration noted to the plantar aspect of the cuboid with granular base and slightly macerated rim. Wound measures approximately 4.4 cm x 2.8 cm x 0.3 cm. No fluctuance, crepitus, erythema, malodor, or drainage noted. Wound does not probe to bone, tunnel, or track. Left lower extremity:  Surgical incision noted to the lateral aspect of the left foot with skin edges coapted. Diffuse xerosis noted periincisional. No fluctuance, crepitus, erythema, drainage, or malodor noted.     Full-thickness ulceration noted to the plantar aspect of the fourth metatarsal head with fibrotic base and macerated rim. Measures 1.4 cm x 1.2 cm x 0.1 cm. No fluctuance, crepitus, erythema, malodor, or drainage noted. Wound does not probe to bone, tunnel, or track. MUSCULOSKELETAL: Muscle strength 4/5 for all pedal compartments tested. No pain with palpation of the foot or ankle b/l. Ankle joint ROM is decreased in dorsiflexion with the knee extended. History of hallux amputation of fifth ray resection left foot. History of transmetatarsal amputation with fourth and fifth ray resections right foot. ASSESSMENT/PLAN  -S/p I&D, partial cuboid resection of right foot, I&D with delayed primary closure of left foot 10/13/2021  -Diabetic foot ulceration, right foot, Carpenter 2  -Diabetic foot ulceration, left foot, Carpenter 1  -Peripheral vascular disease, bilateral lower extremity  -Edema, bilateral lower extremity  -Type 2 diabetes mellitus with peripheral neuropathy  -History of osteomyelitis, bilateral lower extremity  -History of multiple pedal amputations, bilateral lower extremity  -History of noncompliance with weightbearing status     -Patient examined and evaluated at the bedside   -VSS.  No leukocytosis noted (WBC 4.4)  -Hemoglobin A1c 8.3%  -Dressing applied to left lower extremity consisting of wet-to-dry gauze, Kerlix, Ace bandage  -Dressing applied to the right lower extremity consisting wet-to-dry gauze, Kerlix, Ace bandage  -Continue antibiotics per the recommendations of Dr. Chandler Tejeda, vancomycin and meropenem through 11/26/2021  -Prevalon boots reapplied to bilateral lower extremity. To be applied to bilateral lower extremity at all times of sitting/laying.  -Nonweightbearing to bilateral lower extremity      DISPO: Diabetic foot ulceration, bilateral lower extremity; clinically stable at this time. Continue IV antibiotics per prior ID recommendations. Strict nonweightbearing to bilateral lower extremity. Wounds remain stable without signs of infection. Patient okay for discharge from podiatric standpoint pending medical clearance.     Discussed assessment and plan with Dr. Deana Titus DPM.    David Alarcon DPM  Podiatric Resident, PGY-2  Pager #: [phone] or [employer]

## 2021-11-16 NOTE — PROGRESS NOTES
Patient discharged to SNF. Right subclavian picc line in place, alcohol cap on line. Belongings packed and send with patient. AVS in packet with transportation, paper script for oxycodone also in packet. Tele removed.

## 2021-11-18 NOTE — TELEPHONE ENCOUNTER
Rossy Smith at [facility], wanted to clarify stop date for IV ABX.   Per Dr. Kayce Choudhary note on 11/16/2021,  Vancomycin 500 mg iv daily through 12/8  Meropenem 1 gm iv q 12 through 12/8

## 2021-11-19 NOTE — PROGRESS NOTES
Physician Progress Note      Sue SILVA #:                  [id number]  :                       1965  ADMIT DATE:       11/3/2021 2:22 PM  [address] [place] DATE:        2021 6:17 PM  RESPONDING  PROVIDER #:        Simba Mar          QUERY TEXT:    Patient admitted with Bilateral leg wounds. Per Op note dated    documentation of debridement. To accurately reflect the procedure performed   please document if debridement was excisional or nonexcisional and the deepest   depth of tissue removed as down to and including:    [    The medical record reflects the following:  Risk Factors: 61 y/o female with bilateral leg wounds  Clinical Indicators:  PN: Using a sterile #10 blade, the wound noted to   plantar lateral aspect of the foot was excisionally debrided down to and   including down to healthy, bleeding tissue margins. Estimated blood loss less   than 2 cc in total. Hemostasis obtained with direct pressure. Treatment: Excisional debridement  Options provided:  -- Excisional debridement of skin  -- Excisional debridement of subcutaneous tissue  -- Excisional debridement of fascia  -- Excisional debridement of muscle  -- Excisional debridement of bone  -- Other - I will add my own diagnosis  -- Disagree - Not applicable / Not valid  -- Disagree - Clinically unable to determine / Unknown  -- Refer to Clinical Documentation Reviewer    PROVIDER RESPONSE TEXT:    Excisional debridement of subcutaneous tissue of plantar lateral aspect of the   foot was performed during procedure on .     Query created by: Tawanda Chaudhary on 2021 7:38 AM      Electronically signed by:  Simba Mar 2021 9:45 AM

## 2021-11-22 NOTE — PROGRESS NOTES
Physician Progress Note      Lottie Schwartz  [facility] #:                  [id number]  :                       1965  ADMIT DATE:       11/3/2021 2:22 PM  [address] [place] DATE:        2021 6:17 PM  RESPONDING  PROVIDER #:        Silvestre Watson          QUERY TEXT:    Patient admitted with Bilateral leg wounds. Per Op note dated    documentation of debridement. To accurately reflect the procedure performed   please document the deepest depth of tissue removed as down to and including: The medical record reflects the following:  Risk Factors: 59 y/o female with excisional debridement  Clinical Indicators:  PN: \"Patient provided verbal consent to perform   sharp excisional debridement at today's encounter. Using a sterile #10 blade,   the wound noted to plantar lateral aspect of the foot was excisionally   debrided down to and including down to healthy, bleeding tissue margins. Estimated blood loss less than 2 cc in total Hemostasis obtained with direct   pressure. \"n  Treatment: excisional debridement  Options provided:  -- Excisional debridement of skin  -- Excisional debridement of subcutaneous tissue  -- Excisional debridement of fascia  -- Excisional debridement of muscle  -- Excisional debridement of bone  -- Other - I will add my own diagnosis  -- Disagree - Not applicable / Not valid  -- Disagree - Clinically unable to determine / Unknown  -- Refer to Clinical Documentation Reviewer    PROVIDER RESPONSE TEXT:    Excisional debridement of fascia of plantar lateral aspect of the foot was   performed during procedure on .     Query created by: Augustine Fabian on 2021 7:12 AM      Electronically signed by:  Silvestre Watson 2021 8:24 AM

## 2021-11-26 NOTE — LETTER
[facility]  [place] [id number]  Phone: [phone]  Fax: [phone]    Patient: Tara Warren  YOB: 1965  Date: 11/26/2021 Time: 11:26 PM    Leaving the [address] [place] [address] Advice    Chart #:[id number]    This will certify that I, the undersigned,    ______________________________________________________________________    A patient in the above named medical center, having requested discharge and removal from the medical center against the advice of my attending physician(s), hereby release the Emergency Department, its physicians, officers and employees, severally and individually, from any and all liability of any nature whatsoever for any injury or harm or complication of any kind that may result directly or indirectly, by reason of my terminating my stay as a patient from [facility], and hereby waive any and all rights of action I may now have or later acquire as a result of my voluntary departure from [facility] and the termination of my stay as a patient therein. This release is made with the full knowledge of the danger that may result from the action which I am taking.       Date:_______________________                         ___________________________                                                                                    Patient/Legal Representative    Witness:        ____________________________                          ___________________________  Nurse                                                                        Physician

## 2021-11-27 NOTE — ED TRIAGE NOTES
Patient arrives to ED with complaints of need to have dressings on foot wounds redressed along with \"removing a picc line. \" Patient stated that she left her NH facility because she was being \"mistreated. \" RN attempted to get info from patient,but patient could not focus to give info as she kept going on with random issues of medications, race issues, and various other topics.

## 2021-11-27 NOTE — ED PROVIDER NOTES
ED Attending Attestation Note     Date of evaluation: 11/26/2021    This patient was seen by the advance practice provider. I have seen and examined the patient, agree with the workup, evaluation, management and diagnosis. The care plan has been discussed. Right foot drain, discharged to SNF  On abx with PICC line  Left AMA from SNF today over methadone issues   Methadone clinic Monday AM    My assessment reveals a nontoxic appearing woman in no acute distress with a R tunneled IJ line in place, partial amputations of bilateral feet with wound vac on right foot.        Becki Paulino MD  11/26/21 [id number]

## 2021-11-27 NOTE — ED PROVIDER NOTES
[address]          PHYSICIAN ASSISTANT NOTE       Date of evaluation: 11/26/2021    Chief Complaint     Wound Check (along with dressing change) and Other      History of Present Illness     Remberto Burleson is a 60 y.o. female, with a history of hypertension, opiate dependence, hyperlipidemia, COPD, diabetes and recurrent diabetic foot wounds and osteomyelitis involving the feet post toe amputations with a wound VAC currently in the right lateral foot, followed by Dr. Tita Rodriguez. The patient had been residing in a nursing home, getting IV antibiotics through her PICC line until she signed herself out this morning. The patient has been on methadone for many years, was ordered Suboxone at the nursing home however states she was not receiving it and therefore went into withdrawal yesterday. She admits to being combative and argumentative with the staff, had a significant amount of diarrhea during the night and states they did not help clean her up. For this reason she signed herself out today. She states she was able to get 30 mg of methadone from an undisclosed source today and presents to the ED this evening for dressing changes to the feet that was due today as well as to have her PICC line pulled. She has an appointment with podiatry in 3 days on Monday as she has weekly. She was also able to arrange an appointment for early Monday morning at her methadone clinic in order to reestablish care. She is otherwise been well without fever, chills, nausea or vomiting at this time. No chest pain or shortness of breath. She denies drainage from the foot wounds. States she has not had her wound VAC hooked up to the machine but does have this available to her. She would not have sought treatment from the emergency department had she not left the nursing home. Review of Systems     Review of Systems   Constitutional: Negative for chills and fever.    HENT: Negative for congestion and sore throat. Respiratory: Negative for cough. Cardiovascular: Negative for chest pain and palpitations. Gastrointestinal: Positive for diarrhea (resolved). Negative for abdominal pain, nausea and vomiting. Genitourinary: Negative for decreased urine volume and difficulty urinating. Musculoskeletal: Positive for gait problem (uses wheelchair). Negative for myalgias. Skin: Positive for wound (right foot, chronic). Negative for color change and rash. Neurological: Negative for dizziness, light-headedness and headaches. All other systems reviewed and are negative. Past Medical, Surgical, Family, and Social History     She has a past medical history of Asthma, Bacterial vaginosis, Carpal tunnel syndrome, COPD (chronic obstructive pulmonary disease) ([facility] [address] Diabetes mellitus type II, Diabetic neuropathy ([facility] [address] Diastolic CHF ([facility] [address] DVT (deep venous thrombosis) ([facility] [address] Dyslipidemia, Dyspareunia, ETOH abuse, HTN (hypertension), Hx of blood clots, Hyperlipidemia, MRSA (methicillin resistant staph aureus) culture positive, Neuropathy, Pancreatitis, Tobacco abuse, and Uses wheelchair. She has a past surgical history that includes  section (unknown); pre-malignant / benign skin lesion excision (7003); other surgical history (Left, 2016); Toe amputation (Left, 2017); other surgical history (Right, 10/20/2017); other surgical history (Right, 2018); pr debridement, skin, sub-q tissue,muscle,bone,=<20 sq cm (Right, 2018); Foot Debridement (Right, 2019); Foot Debridement (Right, 2019); Foot Debridement (Right, 2019); Foot Debridement (Left, 10/23/2019); Tonsillectomy; knee surgery (Left); Foot Debridement (Right, 3/29/2021); IR TUNNELED CVC PLACE WO SQ PORT/PUMP > 5 YEARS (10/5/2021); Foot Debridement (10/7/2021); Foot Debridement (Bilateral, 10/13/2021); and Upper gastrointestinal endoscopy (N/A, 11/10/2021). Her family history is not on file.   She reports that she quit smoking about 3 years ago. Her smoking use included cigarettes. She has a 30.00 pack-year smoking history. She has never used smokeless tobacco. She reports that she does not drink alcohol and does not use drugs. Medications     Previous Medications    ACETAMINOPHEN (TYLENOL) 325 MG TABLET    Take 650 mg by mouth every 4 hours as needed for Pain or Fever (Mild pain, Temp >101 F)    AMITRIPTYLINE (ELAVIL) 100 MG TABLET    TAKE 1 TABLET BY MOUTH EVERY NIGHT AT BEDTIME    AMMONIUM LACTATE (LAC-HYDRIN) 12 % LOTION    Apply topically nightly Apply to both legs topically at bedtime for itching    ASPIRIN 81 MG CHEWABLE TABLET    Take 81 mg by mouth daily     ATORVASTATIN (LIPITOR) 40 MG TABLET    Take 40 mg by mouth nightly    BISMUTH SUBSALICYLATE (PEPTO BISMOL) 262 MG/15ML SUSPENSION    Take 30 mLs by mouth every 3-4 hours as needed for Indigestion or Diarrhea May take q3h PRN for diarrhea or q4h PRN for indigestion    BLOOD GLUCOSE TEST STRIPS (TRUE METRIX BLOOD GLUCOSE TEST) STRIP    1 each by In Vitro route 2 times daily As needed. BUPRENORPHINE-NALOXONE (SUBOXONE) 4-1 MG FILM SL FILM    Place 1 Film under the tongue 2 times daily.     ELIQUIS 5 MG TABS TABLET    TAKE 1 TABLET BY MOUTH TWICE DAILY    ESCITALOPRAM (LEXAPRO) 10 MG TABLET    Take 1 tablet by mouth daily    GLUCAGON 1 MG INJECTION    Inject 1 kit into the muscle as needed (low blood sugar)    GUAIFENESIN-DEXTROMETHORPHAN (ROBITUSSIN DM) 100-10 MG/5ML SYRUP    Take 10 mLs by mouth every 4 hours as needed for Cough    HYDRALAZINE (APRESOLINE) 50 MG TABLET    Take 50 mg by mouth    HYDROXYZINE (VISTARIL) 50 MG CAPSULE    Take 1 capsule by mouth 3 times daily as needed    INSULIN GLARGINE (LANTUS;BASAGLAR) 100 UNIT/ML INJECTION PEN    Inject 30 Units into the skin nightly    INSULIN LISPRO, 1 UNIT DIAL, 100 UNIT/ML SOPN    Inject 0-18 Units into the skin 3 times daily (with meals)    INSULIN LISPRO, 1 UNIT DIAL, 100 UNIT/ML SOPN Inject 0-9 Units into the skin nightly    INSULIN PEN NEEDLE 31G X 5 MM MISC    1 each by Does not apply route daily    LANCETS MISC    1 each by Does not apply route 2 times daily PHARMACY MAY SUBSTITUTE TO TRUE METRIX LANCETS    MAGNESIUM HYDROXIDE (MILK OF MAGNESIA) 400 MG/5ML SUSPENSION    Take 30 mLs by mouth daily as needed for Constipation    MEROPENEM (MERREM) 1 G INJECTION    Infuse 1,000 mg intravenously every 12 hours    METOPROLOL SUCCINATE (TOPROL XL) 50 MG EXTENDED RELEASE TABLET    Take 1 tablet by mouth daily    MULTIPLE VITAMINS-MINERALS (THERAPEUTIC MULTIVITAMIN-MINERALS) TABLET    Take 1 tablet by mouth daily    NYSTATIN (MYCOSTATIN) 639580 UNIT/GM POWDER    Apply topically 2 times daily Apply to right breast and groin area BID    PANTOPRAZOLE (PROTONIX) 40 MG TABLET    Take 1 tablet by mouth 2 times daily (before meals)    TORSEMIDE (DEMADEX) 20 MG TABLET    Take 2 tablets by mouth daily    VANCOMYCIN (VANCOCIN) 5 G INJECTION    Infuse 500 mg intravenously daily    VITAMIN D (ERGOCALCIFEROL) 1.25 MG (47750 UT) CAPS CAPSULE    Take 50,000 Units by mouth once a week    WOUND DRESSINGS EX    Apply topically Apply saline moistened gauze to the wound on the bottom of the left foot near the fourth digit. Next apply dry gauze over the moistened gauze. Next apply gauze to the top of the left foot, ankle and leg. Loosely wrap the left lower extremity with Kerlix starting from just in front of the toes and ending just below the knee. Gently wrap the left foot with 4 in. Ace bandage starting from just in front off the toes and ending just above the ankle. Wound vac dressing change performed q Monday, Wednesday and Friday to the right lower extremity. Allergies     She is allergic to sulfa antibiotics. Physical Exam     INITIAL VITALS: BP: 85/62, Temp: 98.4 °F (36.9 °C), Pulse: 104, Resp: 16, SpO2: 98 %  Physical Exam  Vitals reviewed.    Constitutional:       General: She is not in acute distress. Appearance: She is well-developed and normal weight. HENT:      Head: Normocephalic and atraumatic. Mouth/Throat:      Mouth: Mucous membranes are moist.   Eyes:      Extraocular Movements: Extraocular movements intact. Conjunctiva/sclera: Conjunctivae normal.   Neck:      Trachea: Phonation normal.   Cardiovascular:      Rate and Rhythm: Normal rate and regular rhythm. Heart sounds: No murmur heard. No friction rub. No gallop. Pulmonary:      Effort: Pulmonary effort is normal. No respiratory distress. Breath sounds: No wheezing, rhonchi or rales. Musculoskeletal:      Cervical back: Normal range of motion and neck supple. Skin:     General: Skin is warm and dry. Findings: No petechiae or rash. Comments: Skin dry and flaky on the bilateral feet with no erythema or malodorous drainage. Wound VAC in place at the right lateral foot with a trace amount of blood under the adhesive, no purulence noted. Neurological:      Mental Status: She is alert and oriented to person, place, and time. Psychiatric:         Mood and Affect: Mood and affect normal.         Behavior: Behavior normal.         RECENT VITALS:  BP: 123/77, Temp: 98.4 °F (36.9 °C), Pulse: 104, Resp: 16, SpO2: 98 %     ED Course     Nursing Notes, Past Medical Hx,Past Surgical Hx, Social Hx, Allergies, and Family Hx were reviewed.     The patient was given the following medications:  Orders Placed This Encounter   Medications    meropenem (MERREM) 1,000 mg in sodium chloride 0.9 % 100 mL IVPB (mini-bag)     Order Specific Question:   Antimicrobial Indications     Answer:   Bone and Joint Infection       CONSULTS:  IP CONSULT TO PODIATRY  IP CONSULT TO INFECTIOUS DISEASES    [address] [facility] [place] L[place] is a 60 y.o. female with a history of chronic osteomyelitis of the right foot for which she has a PICC line in, getting IV antibiotics at the nursing home however left today because she was unsatisfied with the care she was receiving there. She did get her morning dose of antibiotics. She presents to have her PICC line removed, is hoping she can be switched to oral antibiotics and request dressing changes to her feet that was scheduled for today. She has an appointment scheduled in 3 days on Monday with podiatry. She is otherwise well-appearing with stable vital signs. Her right foot wound VAC is still in place, she has no signs of acute infection. She was ordered to receive her evening dose of meropenem. I spoke with podiatry who recommended a wet-to-dry dressing and follow-up as scheduled in 3 days. This was performed in the ED as previously ordered at recent discharge. They stated continuing her IV antibiotics is preferred. I spoke with the on-call physician for Dr. Paty Ramirez initially for infectious disease who also recommended continuing the IV antibiotics. It was therefore determined that admission was indicated as placement in a different facility or arranging home health care is not possible on a late Friday night. The patient however refused admission, did not want to stay in the hospital for 2 days while awaiting to arrange home health care. She is unhappy with the care she receives in various facilities and wishes to go home where her 2 daughters can care for her. I spoke with Dr. Paty Ramirez personally later in the patient's visit and he was made aware that she did not wish to stay. He therefore recommended her PICC line be removed as he did not feel that it was benefiting her significantly. He did not have recommendations regarding oral medications at this time. He stated he would review her cultures and contact her later in the next few days to provide an additional treatment plan. The patient was informed that discontinuing her IV antibiotics early may resulted in worsening infection and ultimately amputation, she expresses understanding.   She still wishes to sign out [address] She is of sound mind to make this decision. Her PICC line was removed and she was discharged home to continue her other medications as directed. She will return to the ED for any worsening symptoms. This patient was also evaluated by the attending physician. All care plans were discussed and agreed upon. Clinical Impression     1. Chronic osteomyelitis of right foot with draining sinus (HCC)    2. Type 2 diabetes mellitus with other specified complication, with long-term current use of insulin ([facility] [address]    3. S/P PICC central line placement    4. History of noncompliance with medical treatment        Disposition     PATIENT REFERRED TO:  Lee Sánchez MD  [address] [id number] [address]  [phone]    Call on 11/29/2021  for IV antibiotic orders and for assistance in arranging home health care    [address]  [phone]    On 11/29/2021  as scheduled      DISCHARGE MEDICATIONS:  Discharge Medication List as of 11/26/2021 11:35 PM          DISPOSITION [place] 11/26/2021 11:15:34 PM     Balw[place], [place]  11/27/21 0246

## 2021-12-03 NOTE — ED NOTES
[id number] pt arrived to ER in cardiac arrest via [facility] fire. EMS called at approximately 0745 to find pt unresponsive and pulseless. CPR continued for approximately 45 minutes en route to hospital. 4 rounds of epi administered and 1 round of narcan. Claudean Harp airway established prior to arrival. Pt transferred to emergency room stretcher at cpr continued at 0751.       Alex Gray RN  12/03/21 6015

## 2021-12-03 NOTE — PROGRESS NOTES
Respiratory Called to ER for CPR, PT with Artifical Airway in place, Ambu with 100% FIO2 used. Breaths given for duration of CPR .

## 2021-12-03 NOTE — ED NOTES
Pt transported to [facility] at this time.      Matilda Kennedy, JAKI  12/03/21 1637

## 2021-12-03 NOTE — ED PROVIDER NOTES
[address] [facility]          ATTENDING PHYSICIAN NOTE       Date of evaluation: 12/3/2021    Chief Complaint     Out-of-hospital cardiac arrest      History of Present Illness     Sakina Ann is a 60 y.o. female who presents to the emergency department by ambulance for further treatment of unwitnessed out-of-hospital cardiac arrest.  No bystander CPR. On EMS arrival, she was pulseless and apneic. Asystolic initially and throughout the resuscitation. Prehospital resuscitation included airway management via IGel, manual and mechanical CPR, and IV access with several doses of epinephrine. Total prehospital resuscitation time was roughly 30 minutes prior to arrival.  There were no episodes of ROSC prior to arrival.    By the report, she was found by her daughter and may have been complaining of shortness of breath at some point recently. Daughter is not available during the resuscitation for further information. Patient cannot contribute to history of present illness or review of systems based on her current status. Review of Systems     Review of Systems   Unable to perform ROS: Acuity of condition       Past Medical, Surgical, Family, and Social History     She has a past medical history of Asthma, Bacterial vaginosis, Carpal tunnel syndrome, COPD (chronic obstructive pulmonary disease) ([address] Diabetes mellitus type II, Diabetic neuropathy ([address] Diastolic CHF ([address] DVT (deep venous thrombosis) ([address] Dyslipidemia, Dyspareunia, ETOH abuse, HTN (hypertension), Hx of blood clots, Hyperlipidemia, MRSA (methicillin resistant staph aureus) culture positive, Neuropathy, Pancreatitis, Tobacco abuse, and Uses wheelchair. She has a past surgical history that includes  section (unknown); pre-malignant / benign skin lesion excision (7003); other surgical history (Left, 2016);  Toe amputation (Left, 2017); other surgical history (Right, 10/20/2017); other surgical history (Right, 04/26/2018); pr debridement, skin, sub-q tissue,muscle,bone,=<20 sq cm (Right, 8/17/2018); Foot Debridement (Right, 1/7/2019); Foot Debridement (Right, 6/6/2019); Foot Debridement (Right, 7/5/2019); Foot Debridement (Left, 10/23/2019); Tonsillectomy; knee surgery (Left); Foot Debridement (Right, 3/29/2021); IR TUNNELED CVC PLACE WO SQ PORT/PUMP > 5 YEARS (10/5/2021); Foot Debridement (10/7/2021); Foot Debridement (Bilateral, 10/13/2021); and Upper gastrointestinal endoscopy (N/A, 11/10/2021). Her family history is not on file. She reports that she quit smoking about 3 years ago. Her smoking use included cigarettes. She has a 30.00 pack-year smoking history. She has never used smokeless tobacco. She reports that she does not drink alcohol and does not use drugs. Medications     Previous Medications    ACETAMINOPHEN (TYLENOL) 325 MG TABLET    Take 650 mg by mouth every 4 hours as needed for Pain or Fever (Mild pain, Temp >101 F)    AMITRIPTYLINE (ELAVIL) 100 MG TABLET    TAKE 1 TABLET BY MOUTH EVERY NIGHT AT BEDTIME    AMMONIUM LACTATE (LAC-HYDRIN) 12 % LOTION    Apply topically nightly Apply to both legs topically at bedtime for itching    ASPIRIN 81 MG CHEWABLE TABLET    Take 81 mg by mouth daily     ATORVASTATIN (LIPITOR) 40 MG TABLET    Take 40 mg by mouth nightly    BISMUTH SUBSALICYLATE (PEPTO BISMOL) 262 MG/15ML SUSPENSION    Take 30 mLs by mouth every 3-4 hours as needed for Indigestion or Diarrhea May take q3h PRN for diarrhea or q4h PRN for indigestion    BLOOD GLUCOSE TEST STRIPS (TRUE METRIX BLOOD GLUCOSE TEST) STRIP    1 each by In Vitro route 2 times daily As needed. BUPRENORPHINE-NALOXONE (SUBOXONE) 4-1 MG FILM SL FILM    Place 1 Film under the tongue 2 times daily.     ELIQUIS 5 MG TABS TABLET    TAKE 1 TABLET BY MOUTH TWICE DAILY    ESCITALOPRAM (LEXAPRO) 10 MG TABLET    Take 1 tablet by mouth daily    GLUCAGON 1 MG INJECTION    Inject 1 kit into the muscle as needed (low blood sugar)    GUAIFENESIN-DEXTROMETHORPHAN (ROBITUSSIN DM) 100-10 MG/5ML SYRUP    Take 10 mLs by mouth every 4 hours as needed for Cough    HYDRALAZINE (APRESOLINE) 50 MG TABLET    Take 50 mg by mouth    HYDROXYZINE (VISTARIL) 50 MG CAPSULE    Take 1 capsule by mouth 3 times daily as needed    INSULIN GLARGINE (LANTUS;BASAGLAR) 100 UNIT/ML INJECTION PEN    Inject 30 Units into the skin nightly    INSULIN LISPRO, 1 UNIT DIAL, 100 UNIT/ML SOPN    Inject 0-18 Units into the skin 3 times daily (with meals)    INSULIN LISPRO, 1 UNIT DIAL, 100 UNIT/ML SOPN    Inject 0-9 Units into the skin nightly    INSULIN PEN NEEDLE 31G X 5 MM MISC    1 each by Does not apply route daily    LANCETS MISC    1 each by Does not apply route 2 times daily PHARMACY MAY SUBSTITUTE TO TRUE METRIX LANCETS    MAGNESIUM HYDROXIDE (MILK OF MAGNESIA) 400 MG/5ML SUSPENSION    Take 30 mLs by mouth daily as needed for Constipation    MEROPENEM (MERREM) 1 G INJECTION    Infuse 1,000 mg intravenously every 12 hours    METOPROLOL SUCCINATE (TOPROL XL) 50 MG EXTENDED RELEASE TABLET    Take 1 tablet by mouth daily    MULTIPLE VITAMINS-MINERALS (THERAPEUTIC MULTIVITAMIN-MINERALS) TABLET    Take 1 tablet by mouth daily    NYSTATIN (MYCOSTATIN) 113029 UNIT/GM POWDER    Apply topically 2 times daily Apply to right breast and groin area BID    PANTOPRAZOLE (PROTONIX) 40 MG TABLET    Take 1 tablet by mouth 2 times daily (before meals)    TORSEMIDE (DEMADEX) 20 MG TABLET    Take 2 tablets by mouth daily    VANCOMYCIN (VANCOCIN) 5 G INJECTION    Infuse 500 mg intravenously daily    VITAMIN D (ERGOCALCIFEROL) 1.25 MG (29207 UT) CAPS CAPSULE    Take 50,000 Units by mouth once a week    WOUND DRESSINGS EX    Apply topically Apply saline moistened gauze to the wound on the bottom of the left foot near the fourth digit. Next apply dry gauze over the moistened gauze. Next apply gauze to the top of the left foot, ankle and leg.   Loosely wrap the left lower extremity with Kerlix starting from just in front of the toes and ending just below the knee. Gently wrap the left foot with 4 in. Ace bandage starting from just in front off the toes and ending just above the ankle. Wound vac dressing change performed q Monday, Wednesday and Friday to the right lower extremity. Allergies     She is allergic to sulfa antibiotics. Physical Exam     INITIAL VITALS: Pulseless and apneic. While undergoing mechanical CPR, femoral pulse palpable at 102 bpm, end-tidal CO2 in the 40s, oxygen saturation greater than 90% with good waveform. General: Chronically ill-appearing woman undergoing active resuscitation  HEENT: Head is normocephalic, atraumatic. Pupils are midrange and nonreactive. IGel in place  Neck: No appreciable traumatic abnormality  Respirations: Apneic. Good lung sounds with manual ventilation. CV: Pulseless. Good femoral pulse while undergoing mechanical CPR  Abd: No evidence of trauma. Not significantly distended  : Normal-appearing external female genitalia  Musculoskeletal: No signs of orthopedic trauma. Wraps to the bilateral feet. Skin: Cool but pink, no lividity  Neuro: Unresponsive  Psych: Cannot be assessed    Diagnostic Results     Rhythm strip interpretations  Asystolic on arrival and through first several pulse checks. Course PEA did develop without a pulse. LABS:   Results for orders placed or performed during the hospital encounter of 12/03/21   POCT Glucose   Result Value Ref Range    POC Glucose 119 (H) 70 - 99 mg/dl    Performed on ACCU-CHEK        Procedures     Left tibial IO was placed by me in standard technique. A blue needle was used. No immediate complications. Needle flushes well. ED Course     Nursing Notes, Past Medical Hx, Past Surgical Hx, Social Hx, Allergies, and Family Hx were reviewed. The patient was given the following medications:  --Please see nursing documentation.   She received several rounds of epinephrine, and an amp of sodium bicarbonate    She was seen and examined on arrival to the treatment area where her resuscitation was continued. EMS Henretta Loge device was continued. ACLS medications were given. Appropriately timed pulse checks with no return of pulse. Never developed a shockable or otherwise perfusing rhythm. More than 20 minutes of ED based resuscitation was attempted. Time of death 200. MEDICAL DECISION MAKING / ASSESSMENT / PLAN     Yoli Avilez is a 60 y.o. female presented to the emergency department ongoing resuscitation for unwitnessed asystolic out-of-hospital cardiac arrest.  More than 30 minutes of CPR had been done prior to arrival.  On initial evaluation, prehospital vascular access appeared compromised and infiltrated. Because of this, additional ACLS was done, and an IO was placed by me until nurses were able to establish additional peripheral access. Respiratory therapy assisted with manual ventilation through the prehospital IGel. As there were good lung sounds, good compliance, good end-tidal CO2, and evidence of good oxygenation, this was not exchanged for an endotracheal tube. She received IV fluids, sodium bicarbonate, and other treatments to address possible rapidly reversible etiologies of her cardiac arrest.  Blood glucose was normal.    At the time of termination of resuscitation, efforts have been ongoing for roughly an hour total.    Clinical Impression     1.  Cardiac arrest [facility])        Disposition       DISPOSITION  2021 08:45:01 AM          Barney Draper MD  [phone]

## 2022-06-07 NOTE — CARE COORDINATION
CM followed up on d/c planning this am , Patient spoke with SW but was still not provided a lit ,  Patient lives in the [facility] [place] the [place] [address] side of [facility]. CM provided a list and some brochures of facilities in that area , to review , She has chosen , [facility] as it is close to her house family and friends . Cm faxed referral and Paulette Durand in admissions will be coming by around 1030 to meet with patient. Patient still needs PICC line placed , and  Clarification of IV atbx she will be  d/c 'd on. PT OT just ordered this am. Will submit HENS . Electronically signed by Dawson Cushing, RN on 2018 at 9:38 AM    CM met with Dr Nitin Hanley who states she needs to clarify Atbx rec: with ID and then anticipate D/C later today . CM  arranged transport for 1630 today via [address] per patient request .  She is NWB on one foot and Heel wt bearing only on the other. HENS submitted :  Document ID: [id number]     Electronically signed by Dawson Cushing, RN on 2018 at 9:44 AM     1450  CM still waiting for clarification of IV / PO atbx for d/c . JAKI Connor has paged ID resident X  2 and   Awaiting return call. If needs PICC placed, she will most likely need to d/cancer tomorrow depending on availability of PICC RN . Transport is at 1600 so will need to place on will call. Electronically signed by Dawson Cushing, RN on 2018 at 2:55 PM     Orders clarified , but not able to do PICC today at bedside :  But they are now able to do in IR , so transport changed to 1700 , Rn and patient aware .       2018  [facility] [place] ([facility])  Clinical Case Management Department    Patient: Kervin Nicholson  MRN: [id number] / : 1965  ACCT: Brendan Levine [de-identified]     Emergency Contacts  Contact 1: Name: [facility]  Contact 1: Number: [phone]  Contact 1: Relationship: daughter   Contact 2: Name: [facility]   Contact 2: Number: [phone]  Contact 2: Relationship: father    Admission Documentation  Attending
CM following for D/C needs : f/u on wound cult results   MRI R foot ordered:  R/O  Osteomyelitis. Wound care and any surgical intervention per Podiatry. Trop elevated:  Stress test this am (-)   And will get MRI this afternoon @ 1525.       From home w/ spouse and wound care center follow up but noted to be noncompliant :  .  To OR 8/17 /2018      Electronically signed by Judith Katz RN on 8/16/2018 at 3:33 PM           
SW received a call from Podiatry Resident this morning, Sunday, stating Pt is ready for DC to SNF  with IV ABX per ID and asked SW to meet with Pt. SW met with Pt at bedside this afternoon. Pt stated, \"they want to discharge me tomorrow but nothing is arranged. I don't know what to do. \"  SW explained that Podiatry Residents feel SNF placement is best and Pt stated, \"yes, I agree and want to go. \"  Pt has Medicare and Medicaid. SW explained Medicare coverage for SNF. Pt states she lives \"on the [place] and is only familiar with [facility] but my friend didn't like it there. \" We discussed [facility] and [facility] and Pt stated she had a family member at [facility] who was pleased with facility. Pt asked if she could speak with her family this evening regarding SNF choices and then make a decision by AM. SW advised Pt that Daina APONTE will follow up in AM. Emotional support provided.     [place], [place]  Case Management  [phone]
2010 - post partial hysterectomy

## 2022-12-16 NOTE — PROGRESS NOTES
Office : [phone]     Fax :[phone]       Nephrology progress  Note      Patient's Name: Tay Baum  11:10 AM  10/4/2021    Reason for Consult:  FAHEEM on CKD 4       Requesting Physician:  Jorge Weeks MD      Chief Complaint:    Chief Complaint   Patient presents with    Leg Pain     x 2 weeks. History of Present iIlness:    Tay Baum is a 60 y.o. female with past medical h/o CKD4, DVT, T2DM c/b b/l lower extremity ulcers  who presented for progressively worsening b/l lower extremity pain for the 2 weeks. Patient states she presented today with pain in legs so severe that it is now inhibiting her ability to ambulate. She endorses that the legs are also more swollen and warm to touch with pain when she touches it. She endorses she tries to eat a healthy diet low in salt, she also denies any major weight fluctuations recently.     Baseline creat is 2/0   Now elevated at 2.4        Interval hx :    No acute events overnight    creatinine level increased from 2.9 to 3.1    Edema +    Wound culture growing pseudomonas       I/O last 3 completed shifts:   In: 200 [P.O.:200]  Out: 36 [Urine:820]    Past Medical History:   Diagnosis Date    Asthma 05/14/2004    Bacterial vaginosis 04/2008    Carpal tunnel syndrome 05/2007    COPD (chronic obstructive pulmonary disease) ([address]     Diabetes mellitus type II 08/2007    10/1/20 pt states does accucheck 2x/day at home    Diabetic neuropathy ([address] 25/4587    Diastolic CHF ([address]     DVT (deep venous thrombosis) ([address] 03/2004    Dyslipidemia 05/2009    Dyspareunia 05/2009    ETOH abuse 03/04/2007    Feet clawing     HTN (hypertension)     Hx of blood clots     Hyperlipidemia     MRSA (methicillin resistant staph RX PROGRESS NOTE: Vancomycin Therapeutic Drug Monitoring      Indication for therapy: Pneumonia    ALLERGIES:  No Known Allergies    Most recent height and weight information:  Weight: 51.3 kg (12/15/22 0951)  Height: 4' 11\" (149.9 cm) (12/15/22 1729)    The Following are the calculated  Current Weights for Micheal Sibley     Adjusted Ideal    47.1 kg 44.3 kg             Labs:  Serum Creatinine and Creatinine Clearance:  Serum creatinine: 1.44 mg/dL (H) 12/15/22 0954  Estimated creatinine clearance: 18.5 mL/min (A)    Maximum Temperature (last 24 hours)     Value Max    Temp 98.4 °F (36.9 °C)        WBC (K/mcL)   Date/Time Value   12/15/2022 1835 9.6   12/15/2022 0954 9.8     Microbiology Results     None            Assessment/Plan:  Briefly, this is a 87 year old female started on vancomycin for Pneumonia, with a target serum trough concentration of 10-15 mcg/mL.  Initial dosing regimen will be 1250 mg loading dose once, followed by a maintenance dose of 750 mg every 48 hours..    Pharmacy will monitor levels as appropriate.    Pharmacy will continue to monitor patient (renal function, microbiology data, risk factors for adverse events, appropriate duration of therapy), will order/monitor serum levels as appropriate, and will adjust dose if/when necessary.      Thank you,    Janice Phelan RPH  12/15/2022 6:59 PM     aureus) culture positive 2017; 2017    foot; leg     Neuropathy 2009    polyneuropathy    Pancreatitis 2004    Scalp lesion 2007    Tobacco abuse 2008    Uses walker     Uses wheelchair     also uses walker    Wears dentures        Past Surgical History:   Procedure Laterality Date     SECTION  unknown    FOOT DEBRIDEMENT Right 2019    INCISION AND DRAINAGE WITH APPLICATION OF STRAVIX GRAFT RIGHT FOOT performed by Brenna Prasad DPM at [address] Right 2019    RIGHT FOOT INCISION AND DRAINAGE WITH STAGING TRANSMETATARSAL AMPUTATION performed by Brenna Prasad DPM at [address] Right 2019    RIGHT FOOT DEBRIDEMENT INCISION AND DRAINAGE, OPEN DIABETIC FOOT ULCER WITH GRAFT PLACEMENT performed by Brenna Prasad DPM at [address] Left 10/23/2019    LEFT FOOT INCISION AND DRAINAGE , DEBRIDEMENT OF OPEN WOUND, APPLICATION OF STRAVIX GRAFT performed by Brenna Prasad DPM at [address] Right 3/29/2021    INCISION AND DRAINAGE, DEBRIDEMENT OF DIABETIC WOUND WITH PLACEMENT OF STRAVIX GRAFT RIGHT FOOT performed by Brenna Prasad DPM at [facility] Left     from falling off ladder -- has screws in place pt report    OTHER SURGICAL HISTORY Left 2016    I & D left foot    OTHER SURGICAL HISTORY Right 10/20/2017    RIGHT GASTROC LENGTHENING ENDOSCOPIC, INJECTION OF AMNI GRAFT    OTHER SURGICAL HISTORY Right 2018    Diabetic foot ulcer I&D w/ integra graft application    NC DEBRIDEMENT, SKIN, SUB-Q TISSUE,MUSCLE,BONE,=<20 SQ CM Right 2018    RIGHT FOOT DEBRIDEMENT INCISION AND DRAINAGE, PARTIAL 5TH RAY AMPUTATION performed by Brenna Prasad DPM at [address] [facility] / [facility]  1/4896    cryotherapy done on lesion    TOE AMPUTATION Left 2017    AMPUTATION LEFT GREAT TOE                 TONSILLECTOMY         History reviewed.  No pertinent family history. reports that she quit smoking about 3 years ago. Her smoking use included cigarettes. She has a 30.00 pack-year smoking history. She has never used smokeless tobacco. She reports that she does not drink alcohol and does not use drugs.         Allergies:  Sulfa antibiotics    Current Medications:    glucose (GLUTOSE) 40 % oral gel 15 g, PRN  dextrose 50 % IV solution, PRN  glucagon (rDNA) injection 1 mg, PRN  dextrose 5 % solution, PRN  insulin glargine (LANTUS;BASAGLAR) injection pen 12 Units, Nightly  insulin lispro (1 Unit Dial) 0-12 Units, TID WC  insulin lispro (1 Unit Dial) 0-6 Units, Nightly  vancomycin (VANCOCIN) 500 mg in dextrose 5 % 100 mL IVPB, Once  meropenem (MERREM) 500 mg IVPB (mini-bag), Q12H  heparin 25,000 units in dextrose 5% 250 mL (premix) infusion, Continuous  heparin (porcine) injection 8,050 Units, PRN  heparin (porcine) injection 4,020 Units, PRN  furosemide (LASIX) injection 20 mg, BID  vancomycin (VANCOCIN) intermittent dosing (placeholder), See Admin Instructions  fluconazole (DIFLUCAN) 200 mg IVPB, Q24H  albuterol (PROVENTIL) nebulizer solution 2.5 mg, Q6H PRN  amitriptyline (ELAVIL) tablet 100 mg, Nightly  aspirin EC tablet 81 mg, Daily  atorvastatin (LIPITOR) tablet 20 mg, Nightly  [Held by provider] doxazosin (CARDURA) tablet 4 mg, BID  [Held by provider] apixaban (ELIQUIS) tablet 5 mg, BID  escitalopram (LEXAPRO) tablet 10 mg, Daily  gabapentin (NEURONTIN) capsule 400 mg, BID  methadone (DOLOPHINE) tablet 140 mg, Daily  [Held by provider] metoprolol succinate (TOPROL XL) extended release tablet 100 mg, Daily  pantoprazole (PROTONIX) tablet 40 mg, Daily  sodium chloride flush 0.9 % injection 5-40 mL, PRN  0.9 % sodium chloride infusion, PRN  ondansetron (ZOFRAN-ODT) disintegrating tablet 4 mg, Q8H PRN   Or  ondansetron (ZOFRAN) injection 4 mg, Q6H PRN  polyethylene glycol (GLYCOLAX) packet 17 g, Daily PRN  acetaminophen (TYLENOL) tablet 650 mg, Q6H PRN Or  acetaminophen (TYLENOL) suppository 650 mg, Q6H PRN  sodium chloride flush 0.9 % injection 5-40 mL, 2 times per day  influenza quadrivalent split vaccine (FLUZONE;FLUARIX;FLULAVAL;AFLURIA) injection 0.5 mL, Prior to discharge        Review of Systems:   14 point ROS obtained but were negative except mentioned in HPI      Physical exam:     Vitals:  BP (!) 165/56   Pulse 74   Temp 98.8 °F (37.1 °C) (Axillary)   Resp 16   Ht 5' 2\" (1.575 m)   Wt 221 lb 12.5 oz (100.6 kg)   LMP 10/23/2015   SpO2 93%   BMI 40.56 kg/m²   Constitutional:  OAA X3 NAD  Skin: no rash, turgor wnl  Heent:  eomi, mmm  Neck: no bruits or jvd noted  Cardiovascular:  S1, S2 without m/r/g  Respiratory: b/L base crackles   Abdomen:  +bs, soft, nt, nd  Ext: +  lower extremity edema  Psychiatric: mood and affect appropriate  Musculoskeletal:  Rom, muscular strength intact    Labs:  CBC:   Recent Labs     10/02/21  0901 10/03/21  0712 10/04/21  0336   WBC 7.1 5.9 7.8   HGB 7.8* 7.5* 7.4*    335 353     BMP:    Recent Labs     10/02/21  0901 10/03/21  0712 10/04/21  0829    136 134*   K 4.1 4.6 5.5*    102 103   CO2 24 24 20*   BUN 43* 41* 44*   CREATININE 2.4* 2.9* 3.1*   GLUCOSE 110* 269* 354*     Ca/Mg/Phos:   Recent Labs     10/02/21  0901 10/03/21  0712 10/04/21  0829   CALCIUM 8.6 8.2* 8.3   MG 1.90 2.00 2.00     Hepatic:   Recent Labs     10/01/21  1947   AST 16   ALT 13   BILITOT <0.2   ALKPHOS 131*     Troponin:   Recent Labs     10/01/21  1912 10/02/21  0055 10/02/21  0901   TROPONINI 0.04* 0.04* 0.03*     BNP: No results for input(s): BNP in the last 72 hours. Lipids: No results for input(s): CHOL, TRIG, HDL, LDLCALC, LABVLDL in the last 72 hours. ABGs: No results for input(s): PHART, PO2ART, JIT6XYP in the last 72 hours. INR: No results for input(s): INR in the last 72 hours.   UA:  Recent Labs     10/02/21  0815   COLORU Yellow   CLARITYU SL CLOUDY*   GLUCOSEU 100*   BILIRUBINUR Negative   KETUA Negative SPECGRAV 1.025   BLOODU TRACE-INTACT*   PHUR 6.0  6.0   PROTEINU 100*   UROBILINOGEN 0.2   NITRU Negative   LEUKOCYTESUR Negative   LABMICR YES   URINETYPE NotGiven      Urine Microscopic:   Recent Labs     10/02/21  0815   BACTERIA 3+*   WBCUA 0-2   RBCUA 0-2   EPIU 2-5     Urine Culture: No results for input(s): LABURIN in the last 72 hours. Urine Chemistry:   Recent Labs     10/02/21  0815   LABCREA 94.9   PROTEINUR 136.00*       IMAGING:  MRI FOOT LEFT WO CONTRAST   Final Result   Osteomyelitis involving the fifth metatarsal and fifth proximal phalanx with extensive osseous destruction. Mild osteomyelitis involving the lateral aspect of the cuboid. Prior partial first digit amputation. MRI FOOT RIGHT WO CONTRAST   Final Result   Multiple prior amputations. Soft tissue ulceration lateral to the cuboid with mild acute osteomyelitis involving the lateral base of the fourth metatarsal and more distal plantar aspect of the remaining fourth metatarsal shaft as well as the lateral aspect of the cuboid. XR FOOT LEFT (MIN 3 VIEWS)   Final Result      Numerous osteotomies with soft tissue swelling and possible ulcer lateral aspect of the right midfoot. No plain radiographic evidence for osteomyelitis, however plain film findings for osteomyelitis are often late findings. 3 views left foot      FINDINGS:      There is a mottled appearance of the phalanges of the right fifth digit as well as the fifth metatarsal. The remaining metatarsals unremarkable in appearance. Patient's had prior osteotomy of the first distal phalangeal tuft. IMPRESSION:      Plain radiographic evidence for osteomyelitis involving the fifth phalanges and fifth metatarsal. Patient's bones are osteopenic. XR FOOT RIGHT (MIN 3 VIEWS)   Final Result      Numerous osteotomies with soft tissue swelling and possible ulcer lateral aspect of the right midfoot.  No plain radiographic evidence for osteomyelitis, however plain film findings for osteomyelitis are often late findings. 3 views left foot      FINDINGS:      There is a mottled appearance of the phalanges of the right fifth digit as well as the fifth metatarsal. The remaining metatarsals unremarkable in appearance. Patient's had prior osteotomy of the first distal phalangeal tuft. IMPRESSION:      Plain radiographic evidence for osteomyelitis involving the fifth phalanges and fifth metatarsal. Patient's bones are osteopenic. XR CHEST PORTABLE   Final Result      Mild cardiomegaly and mild interstitial edema. VL DUP LOWER EXTREMITY ARTERIES BILATERAL    (Results Pending)   VL Extremity Venous Bilateral    (Results Pending)       Assessment/Plan :      1. Faheem onc CKD 4   FAHEEM 2/2 multiple factors   Edema noted  FAHEEM worsened     2. HTN. BP controlled   Continue to hold cardura     3. Acute on chronic CHF   hold lasix     4. DM 2. Needs better control     5. Electrolytes. Monitor and replace.      6. Diabetic foot infection with necrotic ulcer   Renal dose vancomycin   ID following       Recommend to dose adjust all medications  based on renal functions  Maintain SBP> 90 mmHg   Daily weights   AVOID NSAIDs  Avoid Nephrotoxins  Monitor Intake/Output  Call if significant decrease in urine output           Thank you for allowing us to participate in care of Kashmir Ochoancer         Electronically signed by: Lyndsey Rogers DO, 10/4/2021, 11:10 AM      [facility] [address]  Office : [phone]  Fax :[phone]

## 2023-12-24 NOTE — PLAN OF CARE
Problem: Falls - Risk of:  Goal: Will remain free from falls  Description: Will remain free from falls  Outcome: Ongoing  Note: Patient is a fall risk. See Fall Risk assessment for details. Bed is in low, lock position; call light/belongings within reach. No attempts to get out of bed have been made, calls appropriately when assistance is needed. Bed alarm and hourly rounds in place for safety. Will continue to monitor and reassess throughout shift. Problem: Pain:  Goal: Pain level will decrease  Description: Pain level will decrease  Outcome: Ongoing  Note: Pt resting at this time. No complaints of pain. Encouraged patient to call out with any needs. Will continue to monitor. Problem: Skin Integrity:  Goal: Will show no infection signs and symptoms  Description: Will show no infection signs and symptoms  Outcome: Ongoing  Note: Pt at risk for skin breakdown. Skin assessment as documented. Pts skin cleansed with inc cleanser as needed. Pt repositioned in bed with pillow support as needed. Call light within reach. Will continue to monitor. 3

## 2025-01-19 NOTE — PROGRESS NOTES
Physical Therapy    Facility/Department: [address]  Initial Assessment and Treatment    NAME: Virginia Julian  : 1965  MRN: [id number]    Date of Service: 6/10/2019    Discharge Recommendations:  Virginia Julian scored a 17/24 on the AM-PAC short mobility form. Current research shows that an AM-PAC score of 17 or less is typically not associated with a discharge to the patient's home setting. Based on the patients AM-PAC score and their current functional mobility deficits, it is recommended that the patient have 3-5 sessions per week of Physical Therapy at d/c to increase the patients independence. Patient would benefit from continued therapy after discharge   PT Equipment Recommendations  Equipment Needed: No    Assessment   Assessment: Pt is 51 yo F with decreased overall mobility, especially having to maintain R LE NWBing. Pt would benefit from continued inpt PT at d/c. Pt and daughter agree. Will follow. Treatment Diagnosis: Decreased functional mobility  Prognosis: Good  Decision Making: Low Complexity  Clinical Presentation: Role of PT- pt verbalized understanding  REQUIRES PT FOLLOW UP: Yes  Activity Tolerance  Activity Tolerance: Patient Tolerated treatment well;Patient limited by fatigue       Patient Diagnosis(es): The primary encounter diagnosis was Altered mental status, unspecified altered mental status type. A diagnosis of Pneumonia due to organism was also pertinent to this visit.      has a past medical history of Amenorrhea, Anomalies of nails, Asthma, Athlete's foot, Bacterial vaginosis, Carpal tunnel syndrome, COPD (chronic obstructive pulmonary disease) ([address] Diabetes mellitus type II, Diabetic neuropathy ([address] DVT (deep venous thrombosis) ([address] Dyslipidemia, Dyspareunia, ETOH abuse, Feet clawing, HTN (hypertension), Hx of blood clots, Hyperlipidemia, MRSA (methicillin resistant staph aureus) culture positive, Neuropathy, Pain, back, Pain, eye, right, Pancreatitis, Pseudocyst, pancreas, Scalp lesion, Tinea pedis, Tobacco abuse, and Vaginal bleeding, abnormal.   has a past surgical history that includes  section (unknown); pre-malignant / benign skin lesion excision (7003); other surgical history (Left, 2016); Toe amputation (Left, 2017); other surgical history (Right, 10/20/2017); other surgical history (Right, 2018); pr debridement, skin, sub-q tissue,muscle,bone,=<20 sq cm (Right, 2018); Foot Debridement (Right, 2019); and Foot Debridement (Right, 2019). Restrictions  Position Activity Restriction  Other position/activity restrictions: Up with assist, per daughter pt is NWB on R LE and wears a post-op shoe on L LE     Vision/Hearing  Vision: Impaired  Vision Exceptions: Wears glasses at all times  Hearing: Within functional limits       Subjective  General  Chart Reviewed: Yes  Additional Pertinent Hx: Pt admitted on 19 with sepsis. H/o asthma, COPD, DM2, neuropathy, DVT, ETOH abuse. Family / Caregiver Present: Yes(Daughter Haven)  Referring Practitioner: Dr. Skip Delgado  Diagnosis: Sepsis  Subjective  Subjective: Pt supine in bed and agreeable to PT. Pt hopeful to use the [facility].   Pain Screening  Patient Currently in Pain: Denies     Orientation  Orientation  Overall Orientation Status: Impaired  Orientation Level: Disoriented to time;Oriented to place     Social/Functional History  Social/Functional History  Type of Home: Facility([facility])  Home Layout: One level  Bathroom Accessibility: Accessible  ADL Assistance: Needs assistance  Ambulation Assistance: Needs assistance(Transfers only at SNF using walker and NWB on R LE)  Additional Comments: Pt was at [facility] for short time    Objective  AROM RLE (degrees)  RLE AROM: WFL  RLE General AROM: B hips and knees, ankles NT due to bandaging     Strength B LE  Comment: 4+/5 B LE except B ankles NT due to bandaging     Bed mobility  Supine to Sit: Stand by assistance(HOB elevated using rail)  Scooting: Stand by assistance     Transfers  Sit to Stand: Contact guard assistance  Stand to sit: Contact guard assistance  Comment: NWB R LE     Ambulation  Ambulation?: Yes  Ambulation 1  Device: Rolling Walker  Other Apparatus: (Post op shoe on L )  Assistance: Contact guard assistance  Quality of Gait: NWB R LE  Distance: 3 turn steps x 2     Balance  Sitting - Static: Good  Standing - Dynamic: (Min assist with wheeled walker)     Exercises  Comments: Pt toileted with assist from OT     Treatment:  Functional mobility training and pt education    Plan   Plan  Times per week: 2-5  Current Treatment Recommendations: Balance Training, Functional Mobility Training, Transfer Training, Gait Training, Safety Education & Training, Cognitive Reorientation  Safety Devices  Type of devices: Call light within reach, Chair alarm in place, Nurse notified, Left in chair    AM-PAC Score  AM-PAC Inpatient Mobility Raw Score : 17 (06/10/19 1407)  AM-PAC Inpatient T-Scale Score : 42.13 (06/10/19 1407)  Mobility Inpatient CMS 0-100% Score: 50.57 (06/10/19 1407)  Mobility Inpatient CMS G-Code Modifier : CK (06/10/19 1407)    Goals  Short term goals  Time Frame for Short term goals: by discharge  Short term goal 1: Bed mobility with supervision  Short term goal 2: Sit to stand NWB on R LE with SBA  Short term goal 3: Pt will ambulate 15 feet with wheeled walker and NWB on R LE with SBA     Therapy Time   Individual Concurrent Group Co-treatment   Time In 1305         Time Out 1358         Minutes 53           Timed Code Treatment Minutes:   38    Total Treatment Minutes:  53     If pt d/c'd prior to next treatment, this note serves as a discharge note.   [place], [address] [facility] Rajat MILLER: Patient is a 72-year-old male with a history of COPD, former smoker, CAD, GERD, BPH here for evaluation of about 9 days of runny nose, progressive shortness of breath.  Patient reports he has oxygen at home, has started using it because he felt short of breath with minimal exertion and movement.  He denies any fevers or chills.  He also reports an episode of left-sided chest pain when he was reaching for something.  He denies any falls or trauma.  No nausea, vomiting or diarrhea.  No urinary symptoms.  Patient reports he was trying Mucinex.  He is on albuterol and a steroid inhaler which he has been using without any relief.   On exam, patient has poor air movement.  No rosy wheezing.  Concern for viral infection, COPD exacerbation.  Plan for labs, flu/COVID/RSV, x-ray imaging, nebulizer treatment.    Given episode of chest pain, will also check EKG and troponin. Will reassess. Rajat MILLER: Patient is a 72-year-old male with a history of COPD, former smoker, CAD, GERD, BPH here for evaluation of about 9 days of runny nose, progressive shortness of breath.  Patient reports he has oxygen at home, has started using it because he felt short of breath with minimal exertion and movement.  He denies any fevers or chills.  He also reports an episode of left-sided chest pain when he was reaching for something.  He denies any falls or trauma.  No nausea, vomiting or diarrhea.  No urinary symptoms.  Patient reports he was trying Mucinex.  He is on albuterol and a steroid inhaler which he has been using without any relief.   On exam, patient has poor air movement.  No rosy wheezing.  Concern for viral infection, COPD exacerbation.  Plan for labs, flu/COVID/RSV, x-ray imaging, nebulizer treatment, steroids, ceftriaxone/ azithromycin.    Given episode of chest pain, will also check EKG and troponin. Will reassess. Rajat MILLER: Patient is a 72-year-old male with a history of COPD, former smoker, CAD, GERD, BPH here for evaluation of about 9 days of runny nose, progressive shortness of breath.  Patient reports he has oxygen at home, has started using it because he felt short of breath with minimal exertion and movement.  He denies any fevers or chills.  He also reports an episode of left-sided chest pain when he was reaching for something.  He denies any falls or trauma.  No nausea, vomiting or diarrhea.  No urinary symptoms.  Patient reports he was trying Mucinex.  He is on albuterol and a steroid inhaler which he has been using without any relief.   On exam, patient has poor air movement.  No rosy wheezing.  Concern for viral infection, COPD exacerbation.  Plan for labs, flu/COVID/RSV, x-ray imaging, nebulizer treatment, steroids, ceftriaxone/ azithromycin.    Given episode of chest pain, will also check EKG and troponin. Will reassess. See progress notes for updates.

## (undated) DEVICE — COVER LT HNDL BLU PLAS

## (undated) DEVICE — SOLUTION IV 1000ML 0.9% SOD CHL

## (undated) DEVICE — PAD,ABDOMINAL,5"X9",ST,LF,25/BX: Brand: MEDLINE INDUSTRIES, INC.

## (undated) DEVICE — PODIATRY PK

## (undated) DEVICE — LOOP,VESSEL,MAXI,BLUE,2/PK,STERILE: Brand: MEDLINE

## (undated) DEVICE — GLOVE SURG SZ 75 L12IN FNGR THK94MIL WHT POLYMER LTX BEAD

## (undated) DEVICE — STANDARD HYPODERMIC NEEDLE,POLYPROPYLENE HUB: Brand: MONOJECT

## (undated) DEVICE — SYRINGE, LUER LOCK, 10ML: Brand: MEDLINE

## (undated) DEVICE — SUTURE VCRL SZ 5-0 L18IN ABSRB UD L16MM PC-3 3/8 CIR PRIM J844G

## (undated) DEVICE — COAXIAL HIGH FLOW TIP WITH SOFT SHIELD

## (undated) DEVICE — SUTURE ETHLN SZ 3-0 L18IN NONABSORBABLE BLK PS-2 L19MM 3/8 1669H

## (undated) DEVICE — BASIC SINGLE BASIN 1-LF: Brand: MEDLINE INDUSTRIES, INC.

## (undated) DEVICE — ELECTROSURGICAL PENCIL ROCKER SWITCH NON COATED BLADE ELECTRODE 10 FT (3 M) CORD HOLSTER: Brand: MEGADYNE

## (undated) DEVICE — SUTURE ETHLN SZ 4-0 L18IN NONABSORBABLE BLK L24MM FS-1 3/8 1629H

## (undated) DEVICE — SPONGE,LAP,18"X18",DLX,XR,ST,5/PK,40/PK: Brand: MEDLINE

## (undated) DEVICE — MICRO SAGITTAL BLADE (9.4 X 0.4 X 26.2MM)

## (undated) DEVICE — PLATE ES AD W 9FT CRD 2

## (undated) DEVICE — GOWN,SIRUS,POLYRNF,BRTHSLV,XL,30/CS: Brand: MEDLINE

## (undated) DEVICE — HANDPIECE SUCTION TUBING INTERPULSE 10FT

## (undated) DEVICE — SUTURE NONABSORBABLE MONOFILAMENT 3-0 PS-1 18 IN BLK ETHILON 1663H

## (undated) DEVICE — STANDARD HYPODERMIC NEEDLE,ALUMINUM HUB: Brand: MONOJECT

## (undated) DEVICE — TRAY PREP DRY W/ PREM GLV 2 APPL 6 SPNG 2 UNDPD 1 OVERWRAP

## (undated) DEVICE — Z INACTIVE NO SUPPLIER SOLUTIONIRRIG 3000ML 0.9% SOD CHL FLX CONT [79720808] [HOSPIRA WORLDWIDE INC]

## (undated) DEVICE — JEWISH HOSPITAL TURNOVER KIT: Brand: MEDLINE INDUSTRIES, INC.

## (undated) DEVICE — SCANLAN® SURG-I-LOOP® SILICONE LOOPS - BLUE MAXI, 2.5X1.02 MM, 16"/40 CM LONG (2/PKG): Brand: SCANLAN® SURG-I-LOOP® SILICONE LOOPS

## (undated) DEVICE — E-Z CLEAN, NON-STICK, PTFE COATED, ELECTROSURGICAL BLADE ELECTRODE, 2.5 INCH (6.35 CM): Brand: EZ CLEAN

## (undated) DEVICE — SWAB CULTURET AMIES DBL

## (undated) DEVICE — TURNOVER KIT RM INF CTRL TECH

## (undated) DEVICE — ZIMMER® A.T.S. CYLINDRICAL TOURNIQUET CUFF, DUAL PORT, SINGLE BLADDER 18 IN. (46 CM)

## (undated) DEVICE — COVER,TABLE,HEAVY DUTY,77"X90",STRL: Brand: MEDLINE

## (undated) DEVICE — NEEDLE HYPO 16GA L1.5IN PUR POLYPR HUB S STL REG BVL STR

## (undated) DEVICE — BAG WND LAV 1L CLR ETH ACET ACID SOD ACETT BENZALKONIUM CHL

## (undated) DEVICE — FORCEPS BX L240CM JAW DIA2.4MM ORNG L CAP W/ NDL DISP RAD

## (undated) DEVICE — Z CONVERTED USE 2271043 CONTAINER SPEC COLL 4OZ SCR ON LID PEEL PCH

## (undated) DEVICE — Device

## (undated) DEVICE — GOWN,SIRUS,FABRNF,L,20/CS: Brand: MEDLINE

## (undated) DEVICE — HOLDER SCALP PLAS G STD

## (undated) DEVICE — GOWN,SIRUS,POLYRNF,BRTHSLV,LG,30/CS: Brand: MEDLINE

## (undated) DEVICE — PODIATRY: Brand: MEDLINE INDUSTRIES, INC.

## (undated) DEVICE — DRESSING KIT SM 10X75X1 CM VAC WHITEFOAM SENSATRAC

## (undated) DEVICE — BLADE ES L2.75IN ELASTOMERIC COAT DURABLE BEND UPTO 90DEG

## (undated) DEVICE — SYRINGE MED 10ML LUERLOCK TIP W/O SFTY DISP

## (undated) DEVICE — GOWN,SIRUS,POLYRNF,SETINSLV,L,20/CS: Brand: MEDLINE

## (undated) DEVICE — ELECTRODE PT RET AD L9FT HI MOIST COND ADH HYDRGEL CORDED

## (undated) DEVICE — MASK CAPNOGRAPHY AD W35IN DIA58IN SAMP LN L10FT O2 LN

## (undated) DEVICE — TOWEL,STOP FLAG GOLD N-W: Brand: MEDLINE

## (undated) DEVICE — SUTURE ETHLN SZ 5-0 L18IN NONABSORBABLE BLK L19MM PS-2 3/8 1666G

## (undated) DEVICE — DRESSING WND VAC MED GRANUFOAM SENSATRAC

## (undated) DEVICE — PADDING CAST W4INXL4YD ST COT RAYON MICROPLEATED HIGHLY

## (undated) DEVICE — SOLUTION IRRIG 3000ML 0.9% SOD CHL USP UROMATIC PLAS CONT

## (undated) DEVICE — CANNULA SAMP CO2 AD GRN 7FT CO2 AND 7FT O2 TBNG UNIV CONN

## (undated) DEVICE — SUTURE VCRL SZ 3-0 L18IN ABSRB UD PS-2 L19MM 1/2 CIR J497G

## (undated) DEVICE — BLADE SAW W4.5XL25.5MM THK0.4MM CUT THK0.6MM MIC SAG FN

## (undated) DEVICE — SURGIFOAM SPNG SZ 100

## (undated) DEVICE — SUTURE VCRL SZ 4-0 L27IN ABSRB UD L19MM PS-2 3/8 CIR PRIM J426H